# Patient Record
Sex: FEMALE | Race: WHITE | NOT HISPANIC OR LATINO | Employment: OTHER | URBAN - METROPOLITAN AREA
[De-identification: names, ages, dates, MRNs, and addresses within clinical notes are randomized per-mention and may not be internally consistent; named-entity substitution may affect disease eponyms.]

---

## 2021-02-17 ENCOUNTER — HOSPITAL ENCOUNTER (INPATIENT)
Facility: CLINIC | Age: 76
LOS: 12 days | Discharge: NURSING, REHAB OR LONG-TERM CARE | DRG: 025 | End: 2021-03-01
Attending: EMERGENCY MEDICINE | Admitting: INTERNAL MEDICINE
Payer: MEDICARE

## 2021-02-17 DIAGNOSIS — G93.89 BRAIN MASS: ICD-10-CM

## 2021-02-17 DIAGNOSIS — F41.9 ANXIETY: ICD-10-CM

## 2021-02-17 DIAGNOSIS — I10 ESSENTIAL HYPERTENSION: Primary | ICD-10-CM

## 2021-02-17 LAB
ALBUMIN SERPL-MCNC: 3.7 G/DL (ref 3.4–5)
ALBUMIN UR-MCNC: NEGATIVE MG/DL
ALP SERPL-CCNC: 86 U/L (ref 40–150)
ALT SERPL W P-5'-P-CCNC: 35 U/L (ref 0–50)
ANION GAP SERPL CALCULATED.3IONS-SCNC: 6 MMOL/L (ref 3–14)
APPEARANCE UR: CLEAR
AST SERPL W P-5'-P-CCNC: 28 U/L (ref 0–45)
BACTERIA #/AREA URNS HPF: ABNORMAL /HPF
BASOPHILS # BLD AUTO: 0 10E9/L (ref 0–0.2)
BASOPHILS NFR BLD AUTO: 0.4 %
BILIRUB SERPL-MCNC: 0.3 MG/DL (ref 0.2–1.3)
BILIRUB UR QL STRIP: NEGATIVE
BUN SERPL-MCNC: 17 MG/DL (ref 7–30)
CALCIUM SERPL-MCNC: 9.3 MG/DL (ref 8.5–10.1)
CHLORIDE SERPL-SCNC: 107 MMOL/L (ref 94–109)
CO2 SERPL-SCNC: 27 MMOL/L (ref 20–32)
COLOR UR AUTO: ABNORMAL
CREAT SERPL-MCNC: 0.72 MG/DL (ref 0.52–1.04)
DIFFERENTIAL METHOD BLD: ABNORMAL
EOSINOPHIL # BLD AUTO: 0.3 10E9/L (ref 0–0.7)
EOSINOPHIL NFR BLD AUTO: 4.7 %
ERYTHROCYTE [DISTWIDTH] IN BLOOD BY AUTOMATED COUNT: 13.5 % (ref 10–15)
GFR SERPL CREATININE-BSD FRML MDRD: 82 ML/MIN/{1.73_M2}
GLUCOSE SERPL-MCNC: 113 MG/DL (ref 70–99)
GLUCOSE UR STRIP-MCNC: NEGATIVE MG/DL
HCT VFR BLD AUTO: 36.7 % (ref 35–47)
HGB BLD-MCNC: 11.7 G/DL (ref 11.7–15.7)
HGB UR QL STRIP: NEGATIVE
IMM GRANULOCYTES # BLD: 0 10E9/L (ref 0–0.4)
IMM GRANULOCYTES NFR BLD: 0.3 %
INR PPP: 0.98 (ref 0.86–1.14)
KETONES UR STRIP-MCNC: NEGATIVE MG/DL
LABORATORY COMMENT REPORT: NORMAL
LEUKOCYTE ESTERASE UR QL STRIP: ABNORMAL
LYMPHOCYTES # BLD AUTO: 0.6 10E9/L (ref 0.8–5.3)
LYMPHOCYTES NFR BLD AUTO: 8.7 %
MCH RBC QN AUTO: 28.7 PG (ref 26.5–33)
MCHC RBC AUTO-ENTMCNC: 31.9 G/DL (ref 31.5–36.5)
MCV RBC AUTO: 90 FL (ref 78–100)
MONOCYTES # BLD AUTO: 0.6 10E9/L (ref 0–1.3)
MONOCYTES NFR BLD AUTO: 8.3 %
MUCOUS THREADS #/AREA URNS LPF: PRESENT /LPF
NEUTROPHILS # BLD AUTO: 5.3 10E9/L (ref 1.6–8.3)
NEUTROPHILS NFR BLD AUTO: 77.6 %
NITRATE UR QL: NEGATIVE
NRBC # BLD AUTO: 0 10*3/UL
NRBC BLD AUTO-RTO: 0 /100
PH UR STRIP: 7.5 PH (ref 5–7)
PLATELET # BLD AUTO: 353 10E9/L (ref 150–450)
POTASSIUM SERPL-SCNC: 4 MMOL/L (ref 3.4–5.3)
PROT SERPL-MCNC: 6.7 G/DL (ref 6.8–8.8)
RBC # BLD AUTO: 4.08 10E12/L (ref 3.8–5.2)
RBC #/AREA URNS AUTO: <1 /HPF (ref 0–2)
SARS-COV-2 RNA RESP QL NAA+PROBE: NEGATIVE
SODIUM SERPL-SCNC: 140 MMOL/L (ref 133–144)
SOURCE: ABNORMAL
SP GR UR STRIP: 1.01 (ref 1–1.03)
SPECIMEN SOURCE: NORMAL
SQUAMOUS #/AREA URNS AUTO: <1 /HPF (ref 0–1)
UROBILINOGEN UR STRIP-MCNC: 0 MG/DL (ref 0–2)
WBC # BLD AUTO: 6.9 10E9/L (ref 4–11)
WBC #/AREA URNS AUTO: 3 /HPF (ref 0–5)

## 2021-02-17 PROCEDURE — 250N000013 HC RX MED GY IP 250 OP 250 PS 637: Performed by: INTERNAL MEDICINE

## 2021-02-17 PROCEDURE — 250N000012 HC RX MED GY IP 250 OP 636 PS 637: Performed by: NURSE PRACTITIONER

## 2021-02-17 PROCEDURE — 99285 EMERGENCY DEPT VISIT HI MDM: CPT | Mod: 25

## 2021-02-17 PROCEDURE — 99221 1ST HOSP IP/OBS SF/LOW 40: CPT | Performed by: NEUROLOGICAL SURGERY

## 2021-02-17 PROCEDURE — 80053 COMPREHEN METABOLIC PANEL: CPT | Performed by: EMERGENCY MEDICINE

## 2021-02-17 PROCEDURE — 85610 PROTHROMBIN TIME: CPT | Performed by: EMERGENCY MEDICINE

## 2021-02-17 PROCEDURE — 99223 1ST HOSP IP/OBS HIGH 75: CPT | Mod: AI | Performed by: INTERNAL MEDICINE

## 2021-02-17 PROCEDURE — C9803 HOPD COVID-19 SPEC COLLECT: HCPCS

## 2021-02-17 PROCEDURE — 81001 URINALYSIS AUTO W/SCOPE: CPT | Performed by: EMERGENCY MEDICINE

## 2021-02-17 PROCEDURE — 85025 COMPLETE CBC W/AUTO DIFF WBC: CPT | Performed by: EMERGENCY MEDICINE

## 2021-02-17 PROCEDURE — 120N000001 HC R&B MED SURG/OB

## 2021-02-17 PROCEDURE — 87635 SARS-COV-2 COVID-19 AMP PRB: CPT | Performed by: EMERGENCY MEDICINE

## 2021-02-17 RX ORDER — BUSPIRONE HYDROCHLORIDE 15 MG/1
30 TABLET ORAL 2 TIMES DAILY
Status: DISCONTINUED | OUTPATIENT
Start: 2021-02-17 | End: 2021-03-01 | Stop reason: HOSPADM

## 2021-02-17 RX ORDER — ONDANSETRON 2 MG/ML
4 INJECTION INTRAMUSCULAR; INTRAVENOUS EVERY 6 HOURS PRN
Status: DISCONTINUED | OUTPATIENT
Start: 2021-02-17 | End: 2021-03-01 | Stop reason: HOSPADM

## 2021-02-17 RX ORDER — HYDRALAZINE HYDROCHLORIDE 20 MG/ML
10 INJECTION INTRAMUSCULAR; INTRAVENOUS EVERY 4 HOURS PRN
Status: DISCONTINUED | OUTPATIENT
Start: 2021-02-17 | End: 2021-03-01 | Stop reason: HOSPADM

## 2021-02-17 RX ORDER — LABETALOL HYDROCHLORIDE 5 MG/ML
10 INJECTION, SOLUTION INTRAVENOUS
Status: DISCONTINUED | OUTPATIENT
Start: 2021-02-17 | End: 2021-02-27

## 2021-02-17 RX ORDER — ACETAMINOPHEN 325 MG/1
650 TABLET ORAL EVERY 4 HOURS PRN
Status: ON HOLD | COMMUNITY
End: 2022-04-05

## 2021-02-17 RX ORDER — DEXAMETHASONE 4 MG/1
4 TABLET ORAL EVERY 6 HOURS SCHEDULED
Status: DISCONTINUED | OUTPATIENT
Start: 2021-02-17 | End: 2021-02-21

## 2021-02-17 RX ORDER — ONDANSETRON 4 MG/1
4 TABLET, ORALLY DISINTEGRATING ORAL EVERY 6 HOURS PRN
Status: DISCONTINUED | OUTPATIENT
Start: 2021-02-17 | End: 2021-03-01 | Stop reason: HOSPADM

## 2021-02-17 RX ORDER — BUPROPION HYDROCHLORIDE 150 MG/1
450 TABLET ORAL EVERY MORNING
Status: ON HOLD | COMMUNITY
End: 2021-03-01

## 2021-02-17 RX ORDER — LIDOCAINE 40 MG/G
CREAM TOPICAL
Status: DISCONTINUED | OUTPATIENT
Start: 2021-02-17 | End: 2021-03-01

## 2021-02-17 RX ORDER — ACETAMINOPHEN 500 MG
500 TABLET ORAL 2 TIMES DAILY PRN
Status: DISCONTINUED | OUTPATIENT
Start: 2021-02-17 | End: 2021-02-18

## 2021-02-17 RX ORDER — BUSPIRONE HYDROCHLORIDE 30 MG/1
30 TABLET ORAL 2 TIMES DAILY
Status: ON HOLD | COMMUNITY
End: 2024-05-05

## 2021-02-17 RX ADMIN — BUSPIRONE HYDROCHLORIDE 30 MG: 15 TABLET ORAL at 20:46

## 2021-02-17 RX ADMIN — DEXAMETHASONE 4 MG: 4 TABLET ORAL at 23:31

## 2021-02-17 RX ADMIN — DEXAMETHASONE 4 MG: 4 TABLET ORAL at 19:22

## 2021-02-17 RX ADMIN — ACETAMINOPHEN 500 MG: 500 TABLET, FILM COATED ORAL at 23:31

## 2021-02-17 ASSESSMENT — ENCOUNTER SYMPTOMS
WEAKNESS: 1
HEADACHES: 0
SPEECH DIFFICULTY: 0

## 2021-02-17 ASSESSMENT — ACTIVITIES OF DAILY LIVING (ADL): ADLS_ACUITY_SCORE: 15

## 2021-02-17 NOTE — PHARMACY-ADMISSION MEDICATION HISTORY
Pharmacy Medication History  Admission medication history interview status for the 2/17/2021  admission is complete. See EPIC admission navigator for prior to admission medications     Location of Interview: Phone  Medication history sources: Patient    Significant changes made to the medication list:  1) Med list fully updated from 2015.       Medication reconciliation completed by provider prior to medication history? No    Time spent in this activity: 10 minutes    Prior to Admission medications    Medication Sig Last Dose Taking? Auth Provider   acetaminophen (TYLENOL) 500 MG tablet Take 500 mg by mouth 2 times daily as needed for mild pain prn med Yes Unknown, Entered By History   buPROPion (WELLBUTRIN XL) 150 MG 24 hr tablet Take 450 mg by mouth every morning 2/17/2021 at am Yes Unknown, Entered By History   busPIRone HCl (BUSPAR) 30 MG tablet Take 30 mg by mouth 2 times daily 2/17/2021 at am x 1 dose Yes Unknown, Entered By History   FLUoxetine (PROZAC) 20 MG capsule Take 80 mg by mouth daily 2/17/2021 at am Yes Unknown, Entered By History       The information provided in this note is only as accurate as the sources available at the time of update(s)

## 2021-02-17 NOTE — ED NOTES
[facility]  ED Nurse Handoff Report    ED Chief complaint: Cerebrovascular Accident      ED Diagnosis:   Final diagnoses:   None       Code Status: Full Code    Allergies:   Allergies   Allergen Reactions     Bacitracin Rash     Bactroban is effective; No difficulties     Erythromycin      intolerant.     Meperidine Hcl      intolerant only   Demerol     Chloraprep One Step Rash       Patient Story: Olga comes to the ER today for abnormal ct/mri results of her head, see results. She has a brain tumor with bleeding and swelling. Her symptoms started last week when she was having difficulty with writing. VSS. Neuro pa is at the bedside at this time.        Treatments and/or interventions provided: neuro consult  Patient's response to treatments and/or interventions: na    To be done/followed up on inpatient unit:  steroids/surgery    Does this patient have any cognitive concerns?: no    Activity level - Baseline/Home:  Independent  Activity Level - Current:   Independent    Patient's Preferred language: English   Needed?: No    Isolation: None  Infection: Not Applicable  Patient tested for COVID 19 prior to admission: YES  Bariatric?: No    Vital Signs:   Vitals:    02/17/21 1307   BP: (!) 156/99   Pulse: 76   Resp: 14   Temp: 98  F (36.7  C)   TempSrc: Oral   SpO2: 97%       Cardiac Rhythm:     Was the PSS-3 completed:   yes  What interventions are required if any?               Family Comments: daughter at bedside  OBS brochure/video discussed/provided to patient/family: N/A              Name of person given brochure if not patient: na              Relationship to patient: na    For the majority of the shift this patient's behavior was Green.   Behavioral interventions performed were na.    ED NURSE PHONE NUMBER: [phone]
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
## 2021-02-17 NOTE — PROGRESS NOTES
RECEIVING UNIT ED HANDOFF REVIEW    ED Nurse Handoff Report was reviewed by: Joanie Pena RN on February 17, 2021 at 5:00 PM

## 2021-02-18 ENCOUNTER — APPOINTMENT (OUTPATIENT)
Dept: CT IMAGING | Facility: CLINIC | Age: 76
DRG: 025 | End: 2021-02-18
Attending: NURSE PRACTITIONER
Payer: MEDICARE

## 2021-02-18 PROBLEM — F32.9 MAJOR DEPRESSION: Status: ACTIVE | Noted: 2021-02-18

## 2021-02-18 PROBLEM — J44.9 CHRONIC OBSTRUCTIVE PULMONARY DISEASE (H): Status: ACTIVE | Noted: 2021-02-18

## 2021-02-18 LAB
ALBUMIN SERPL-MCNC: 3.7 G/DL (ref 3.4–5)
ALP SERPL-CCNC: 87 U/L (ref 40–150)
ALT SERPL W P-5'-P-CCNC: 35 U/L (ref 0–50)
ANION GAP SERPL CALCULATED.3IONS-SCNC: 6 MMOL/L (ref 3–14)
AST SERPL W P-5'-P-CCNC: 24 U/L (ref 0–45)
BILIRUB DIRECT SERPL-MCNC: 0.1 MG/DL (ref 0–0.2)
BILIRUB SERPL-MCNC: 0.3 MG/DL (ref 0.2–1.3)
BUN SERPL-MCNC: 19 MG/DL (ref 7–30)
CALCIUM SERPL-MCNC: 9.4 MG/DL (ref 8.5–10.1)
CHLORIDE SERPL-SCNC: 106 MMOL/L (ref 94–109)
CO2 SERPL-SCNC: 28 MMOL/L (ref 20–32)
CREAT SERPL-MCNC: 0.74 MG/DL (ref 0.52–1.04)
ERYTHROCYTE [DISTWIDTH] IN BLOOD BY AUTOMATED COUNT: 13.5 % (ref 10–15)
GFR SERPL CREATININE-BSD FRML MDRD: 79 ML/MIN/{1.73_M2}
GLUCOSE BLDC GLUCOMTR-MCNC: 117 MG/DL (ref 70–99)
GLUCOSE BLDC GLUCOMTR-MCNC: 135 MG/DL (ref 70–99)
GLUCOSE BLDC GLUCOMTR-MCNC: 99 MG/DL (ref 70–99)
GLUCOSE SERPL-MCNC: 132 MG/DL (ref 70–99)
HCT VFR BLD AUTO: 37.6 % (ref 35–47)
HGB BLD-MCNC: 12.1 G/DL (ref 11.7–15.7)
INR PPP: 1.03 (ref 0.86–1.14)
MCH RBC QN AUTO: 29 PG (ref 26.5–33)
MCHC RBC AUTO-ENTMCNC: 32.2 G/DL (ref 31.5–36.5)
MCV RBC AUTO: 90 FL (ref 78–100)
PLATELET # BLD AUTO: 343 10E9/L (ref 150–450)
POTASSIUM SERPL-SCNC: 4.2 MMOL/L (ref 3.4–5.3)
PROT SERPL-MCNC: 6.9 G/DL (ref 6.8–8.8)
RBC # BLD AUTO: 4.17 10E12/L (ref 3.8–5.2)
SODIUM SERPL-SCNC: 140 MMOL/L (ref 133–144)
WBC # BLD AUTO: 7 10E9/L (ref 4–11)

## 2021-02-18 PROCEDURE — 80048 BASIC METABOLIC PNL TOTAL CA: CPT | Performed by: INTERNAL MEDICINE

## 2021-02-18 PROCEDURE — 250N000011 HC RX IP 250 OP 636: Performed by: INTERNAL MEDICINE

## 2021-02-18 PROCEDURE — 250N000013 HC RX MED GY IP 250 OP 250 PS 637: Performed by: INTERNAL MEDICINE

## 2021-02-18 PROCEDURE — 36415 COLL VENOUS BLD VENIPUNCTURE: CPT | Performed by: INTERNAL MEDICINE

## 2021-02-18 PROCEDURE — 85610 PROTHROMBIN TIME: CPT | Performed by: INTERNAL MEDICINE

## 2021-02-18 PROCEDURE — 71260 CT THORAX DX C+: CPT | Mod: MG

## 2021-02-18 PROCEDURE — 99233 SBSQ HOSP IP/OBS HIGH 50: CPT | Performed by: NEUROLOGICAL SURGERY

## 2021-02-18 PROCEDURE — 80076 HEPATIC FUNCTION PANEL: CPT | Performed by: INTERNAL MEDICINE

## 2021-02-18 PROCEDURE — 120N000001 HC R&B MED SURG/OB

## 2021-02-18 PROCEDURE — 250N000009 HC RX 250: Performed by: INTERNAL MEDICINE

## 2021-02-18 PROCEDURE — 250N000012 HC RX MED GY IP 250 OP 636 PS 637: Performed by: NURSE PRACTITIONER

## 2021-02-18 PROCEDURE — 999N001017 HC STATISTIC GLUCOSE BY METER IP

## 2021-02-18 PROCEDURE — 85027 COMPLETE CBC AUTOMATED: CPT | Performed by: INTERNAL MEDICINE

## 2021-02-18 PROCEDURE — 250N000013 HC RX MED GY IP 250 OP 250 PS 637: Performed by: PHYSICIAN ASSISTANT

## 2021-02-18 PROCEDURE — 83036 HEMOGLOBIN GLYCOSYLATED A1C: CPT | Performed by: INTERNAL MEDICINE

## 2021-02-18 PROCEDURE — 99232 SBSQ HOSP IP/OBS MODERATE 35: CPT | Performed by: INTERNAL MEDICINE

## 2021-02-18 RX ORDER — SENNOSIDES 8.6 MG
2 TABLET ORAL 2 TIMES DAILY
Status: DISCONTINUED | OUTPATIENT
Start: 2021-02-18 | End: 2021-03-01 | Stop reason: HOSPADM

## 2021-02-18 RX ORDER — ACETAMINOPHEN 500 MG
500 TABLET ORAL EVERY 6 HOURS PRN
Status: DISCONTINUED | OUTPATIENT
Start: 2021-02-18 | End: 2021-02-25

## 2021-02-18 RX ORDER — LORAZEPAM 0.5 MG/1
0.5 TABLET ORAL 3 TIMES DAILY PRN
Status: DISCONTINUED | OUTPATIENT
Start: 2021-02-18 | End: 2021-03-01 | Stop reason: HOSPADM

## 2021-02-18 RX ORDER — POLYETHYLENE GLYCOL 3350 17 G/17G
17 POWDER, FOR SOLUTION ORAL 2 TIMES DAILY PRN
Status: DISCONTINUED | OUTPATIENT
Start: 2021-02-18 | End: 2021-03-01 | Stop reason: HOSPADM

## 2021-02-18 RX ORDER — BUPROPION HYDROCHLORIDE 150 MG/1
300 TABLET ORAL DAILY
Status: DISCONTINUED | OUTPATIENT
Start: 2021-02-18 | End: 2021-02-18

## 2021-02-18 RX ORDER — IOPAMIDOL 755 MG/ML
79 INJECTION, SOLUTION INTRAVASCULAR ONCE
Status: COMPLETED | OUTPATIENT
Start: 2021-02-18 | End: 2021-02-18

## 2021-02-18 RX ORDER — FAMOTIDINE 20 MG/1
20 TABLET, FILM COATED ORAL 2 TIMES DAILY
Status: DISCONTINUED | OUTPATIENT
Start: 2021-02-18 | End: 2021-03-01 | Stop reason: HOSPADM

## 2021-02-18 RX ADMIN — DEXAMETHASONE 4 MG: 4 TABLET ORAL at 06:48

## 2021-02-18 RX ADMIN — ACETAMINOPHEN 500 MG: 500 TABLET, FILM COATED ORAL at 21:40

## 2021-02-18 RX ADMIN — DEXAMETHASONE 4 MG: 4 TABLET ORAL at 18:56

## 2021-02-18 RX ADMIN — IOPAMIDOL 79 ML: 755 INJECTION, SOLUTION INTRAVENOUS at 15:21

## 2021-02-18 RX ADMIN — ACETAMINOPHEN 500 MG: 500 TABLET, FILM COATED ORAL at 12:42

## 2021-02-18 RX ADMIN — LORAZEPAM 0.5 MG: 0.5 TABLET ORAL at 12:42

## 2021-02-18 RX ADMIN — FLUOXETINE 80 MG: 20 CAPSULE ORAL at 09:41

## 2021-02-18 RX ADMIN — DEXAMETHASONE 4 MG: 4 TABLET ORAL at 12:42

## 2021-02-18 RX ADMIN — STANDARDIZED SENNA CONCENTRATE 2 TABLET: 8.6 TABLET ORAL at 21:29

## 2021-02-18 RX ADMIN — FAMOTIDINE 20 MG: 20 TABLET ORAL at 21:30

## 2021-02-18 RX ADMIN — BUSPIRONE HYDROCHLORIDE 30 MG: 15 TABLET ORAL at 09:42

## 2021-02-18 RX ADMIN — FAMOTIDINE 20 MG: 20 TABLET ORAL at 12:41

## 2021-02-18 RX ADMIN — BUSPIRONE HYDROCHLORIDE 30 MG: 15 TABLET ORAL at 21:31

## 2021-02-18 RX ADMIN — LORAZEPAM 0.5 MG: 0.5 TABLET ORAL at 21:30

## 2021-02-18 RX ADMIN — SODIUM CHLORIDE 64 ML: 9 INJECTION, SOLUTION INTRAVENOUS at 15:24

## 2021-02-18 ASSESSMENT — ENCOUNTER SYMPTOMS
NAUSEA: 0
COUGH: 0
VOMITING: 0
FEVER: 0
CONFUSION: 0
CHILLS: 0
SHORTNESS OF BREATH: 0

## 2021-02-18 ASSESSMENT — ACTIVITIES OF DAILY LIVING (ADL)
ADLS_ACUITY_SCORE: 13
ADLS_ACUITY_SCORE: 13
ADLS_ACUITY_SCORE: 14
ADLS_ACUITY_SCORE: 13

## 2021-02-18 NOTE — PROGRESS NOTES
[facility]    Neurosurgery Progress Note    Date of Service (when I saw the patient): 02/18/2021     Assessment & Plan   Olga Bailey is a 75 year old female with a history of skin carcinoma and meningioma (followed by neurologist Dr. Jayjay Tomlin) who was admitted on 2/17/2021. She presented with a brain mass. Today she was seen sitting up in bed. She is alert and follows commands appropriately. She is anxious about the new findings.     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 Stable small meningioma long the posterior right occipital convexity    Plan:   -Continue decadron  -Dr. Cummings to discuss findings and plan with patient and family this afternoon    ADDENDUM:  Dr. Cummings was able to meet with patient and family. Plan for continued decadron, CT CAP, and PT/OT evaluation. Surgery was discussed in detail. Plan for discharge from the hospital if deemed safe by hospital service and therapies in the next day or so with surgery next week.       I have discussed the following assessment and plan Dr. Cummings who is in agreement with initial plan and will follow up with further consultation recommendations.    Funmilayo Hampton CNP  [facility]  [address] [address]    Tel [phone]  Pager [phone]      Interval History   Stable.    Physical Exam   Temp: 97.8  F (36.6  C) Temp src: Oral BP: 120/75 Pulse: 78   Resp: 16 SpO2: 96 % O2 Device: None (Room air)    Vitals:    02/18/21 0330   Weight: 157 lb (71.2 kg)     Vital Signs with Ranges  Temp:  [97.4  F (36.3  C)-98  F (36.7  C)] 97.8  F (36.6  C)  Pulse:  [69-80] 78  Resp:  [12-16] 16  BP: (120-170)/(75-99) 120/75  SpO2:  [95 %-98 %] 96 %  I/O last 3  completed shifts:  In: 120 [P.O.:120]  Out: -      , Blood pressure 120/75, pulse 78, temperature 97.8  F (36.6  C), temperature source Oral, resp. rate 16, weight 157 lb (71.2 kg), SpO2 96 %.  157 lbs 0 oz  HEENT:  Normocephalic.    Heart:  No peripheral edema  Lungs:  No SOB  Skin:  Warm and dry, good capillary refill.  Extremities:  Good radial and dorsalis pedis pulses bilaterally, no edema, cyanosis or clubbing.    NEUROLOGICAL EXAMINATION:   Mental status:  Alert and follows commands.  Motor:  Strength is 5/5 throughout the upper and lower extremities    Medications       busPIRone HCl  30 mg Oral BID     dexamethasone  4 mg Oral Q6H ELGIN     famotidine  20 mg Oral BID     FLUoxetine  80 mg Oral Daily       Data     CBC RESULTS:   Recent Labs   Lab Test 02/18/21  0735   WBC 7.0   RBC 4.17   HGB 12.1   HCT 37.6   MCV 90   MCH 29.0   MCHC 32.2   RDW 13.5        Basic Metabolic Panel:  Lab Results   Component Value Date     02/18/2021      Lab Results   Component Value Date    POTASSIUM 4.2 02/18/2021     Lab Results   Component Value Date    CHLORIDE 106 02/18/2021     Lab Results   Component Value Date    ESTIVEN 9.4 02/18/2021     Lab Results   Component Value Date    CO2 28 02/18/2021     Lab Results   Component Value Date    BUN 19 02/18/2021     Lab Results   Component Value Date    CR 0.74 02/18/2021     Lab Results   Component Value Date     02/18/2021     INR:  Lab Results   Component Value Date    INR 1.03 02/18/2021    INR 0.98 02/17/2021

## 2021-02-18 NOTE — PROGRESS NOTES
[facility]    Hospitalist Progress Note    Assessment & Plan   75 year old female who was admitted on 2/17/2021 with headaches, speech and reading problems, and found to have left frontal-parital mass, and an incidental meningioma:     Impression:   Principal Problem:    Brain mass, 3.3 cm left Frontal Parietal    -- on Decadron, will add Pepcid bid to prevent stress ulceration     Active Problems:    Chronic obstructive pulmonary disease -- stable      Major depression   -- Wellbutrin held because of potential to lower seizure threshold, will continue Prozac      Anxiety   -- will add Ativan 0.5 mg tid prn      Plan:  Await definitive treatment plan per Neurosurgery.      DVT Prophylaxis: Pneumatic Compression Devices  Code Status: Full Code    Disposition: Expected discharge in several days     Bonilla Tian MD  Pager [phone]  Cell Phone [phone]  Text Page (7am to 6pm)    Interval History   Mild headache today.     Physical Exam   Temp: 97.8  F (36.6  C) Temp src: Oral BP: 120/75 Pulse: 78   Resp: 16 SpO2: 96 % O2 Device: None (Room air)    Vitals:    02/18/21 0330   Weight: 71.2 kg (157 lb)     Vital Signs with Ranges  Temp:  [97.4  F (36.3  C)-98  F (36.7  C)] 97.8  F (36.6  C)  Pulse:  [69-80] 78  Resp:  [12-16] 16  BP: (120-170)/(75-99) 120/75  SpO2:  [95 %-98 %] 96 %  I/O last 3 completed shifts:  In: 120 [P.O.:120]  Out: -     # Pain Assessment:  Current Pain Score 2/18/2021   Patient currently in pain? denies   Pain score (0-10) -   Olga negron pain level was assessed and she currently denies pain.        Constitutional: Awake, alert, cooperative, no apparent distress  Respiratory: Clear to auscultation bilaterally, no crackles or wheezing  Cardiovascular: Regular rate and rhythm, normal S1 and S2, and no murmur noted  GI: Normal bowel sounds, soft, non-distended, non-tender  Extrem: No calf tenderness, no ankle edema  Neuro: Ox3, no focal motor or sensory  deficits, slight tremor with both hands, and is right handed and has slight dysmetria with right hand FNF, and slow finger opposition.   Right pupil 1.5 mm and left 2.0 mm.     Medications       busPIRone HCl  30 mg Oral BID     dexamethasone  4 mg Oral Q6H ELGIN     famotidine  20 mg Oral BID     FLUoxetine  80 mg Oral Daily       Data   Recent Labs   Lab 02/18/21  0735 02/17/21  1305   WBC 7.0 6.9   HGB 12.1 11.7   MCV 90 90    353   INR 1.03 0.98    140   POTASSIUM 4.2 4.0   CHLORIDE 106 107   CO2 28 27   BUN 19 17   CR 0.74 0.72   ANIONGAP 6 6   ESTIVEN 9.4 9.3   * 113*   ALBUMIN 3.7 3.7   PROTTOTAL 6.9 6.7*   BILITOTAL 0.3 0.3   ALKPHOS 87 86   ALT 35 35   AST 24 28       Imaging:   No results found for this or any previous visit (from the past 24 hour(s)).
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
## 2021-02-18 NOTE — PLAN OF CARE
Pt here with new brain mass. A&Ox4. Neuros intact except for L pupil 3mm R 2mm. VSS, BP <150. Tele NSR. Regular diet, thin liquids. Takes pills whole with water. Up with SBA. Gave tylenol x1 for mild headache. Pt scoring green on the Aggression Stop Light Tool. Plan to discuss surgical options today. Belongings at bedside with patient.

## 2021-02-18 NOTE — PROGRESS NOTES
Pt here with brain mass. A&O X4. Neuros intact except for left pupil> then right. VSS. Tele NSR. Regular diet, thin liquids. Takes pills whole. Up with SBA. Denies pain. Pt scoring green on the Aggression Stop Light Tool. Plan to discuss surgical options with MD tomorrow.

## 2021-02-19 ENCOUNTER — APPOINTMENT (OUTPATIENT)
Dept: MRI IMAGING | Facility: CLINIC | Age: 76
DRG: 025 | End: 2021-02-19
Attending: NURSE PRACTITIONER
Payer: MEDICARE

## 2021-02-19 ENCOUNTER — APPOINTMENT (OUTPATIENT)
Dept: OCCUPATIONAL THERAPY | Facility: CLINIC | Age: 76
DRG: 025 | End: 2021-02-19
Attending: NURSE PRACTITIONER
Payer: MEDICARE

## 2021-02-19 ENCOUNTER — APPOINTMENT (OUTPATIENT)
Dept: PHYSICAL THERAPY | Facility: CLINIC | Age: 76
DRG: 025 | End: 2021-02-19
Attending: NURSE PRACTITIONER
Payer: MEDICARE

## 2021-02-19 LAB
GLUCOSE BLDC GLUCOMTR-MCNC: 101 MG/DL (ref 70–99)
GLUCOSE BLDC GLUCOMTR-MCNC: 113 MG/DL (ref 70–99)
GLUCOSE BLDC GLUCOMTR-MCNC: 135 MG/DL (ref 70–99)
GLUCOSE BLDC GLUCOMTR-MCNC: 205 MG/DL (ref 70–99)
HBA1C MFR BLD: 5.4 % (ref 0–5.6)

## 2021-02-19 PROCEDURE — G1004 CDSM NDSC: HCPCS

## 2021-02-19 PROCEDURE — 99232 SBSQ HOSP IP/OBS MODERATE 35: CPT | Performed by: INTERNAL MEDICINE

## 2021-02-19 PROCEDURE — 250N000013 HC RX MED GY IP 250 OP 250 PS 637: Performed by: INTERNAL MEDICINE

## 2021-02-19 PROCEDURE — 250N000013 HC RX MED GY IP 250 OP 250 PS 637: Performed by: PHYSICIAN ASSISTANT

## 2021-02-19 PROCEDURE — 97166 OT EVAL MOD COMPLEX 45 MIN: CPT | Mod: GO | Performed by: OCCUPATIONAL THERAPIST

## 2021-02-19 PROCEDURE — 97535 SELF CARE MNGMENT TRAINING: CPT | Mod: GO | Performed by: OCCUPATIONAL THERAPIST

## 2021-02-19 PROCEDURE — 250N000012 HC RX MED GY IP 250 OP 636 PS 637: Performed by: NURSE PRACTITIONER

## 2021-02-19 PROCEDURE — A9585 GADOBUTROL INJECTION: HCPCS | Performed by: INTERNAL MEDICINE

## 2021-02-19 PROCEDURE — 97110 THERAPEUTIC EXERCISES: CPT | Mod: GP

## 2021-02-19 PROCEDURE — 97535 SELF CARE MNGMENT TRAINING: CPT | Mod: GP

## 2021-02-19 PROCEDURE — 97112 NEUROMUSCULAR REEDUCATION: CPT | Mod: GP

## 2021-02-19 PROCEDURE — 250N000011 HC RX IP 250 OP 636: Performed by: INTERNAL MEDICINE

## 2021-02-19 PROCEDURE — 999N001017 HC STATISTIC GLUCOSE BY METER IP

## 2021-02-19 PROCEDURE — 97161 PT EVAL LOW COMPLEX 20 MIN: CPT | Mod: GP

## 2021-02-19 PROCEDURE — 255N000002 HC RX 255 OP 636: Performed by: INTERNAL MEDICINE

## 2021-02-19 PROCEDURE — 120N000001 HC R&B MED SURG/OB

## 2021-02-19 RX ORDER — NICOTINE POLACRILEX 4 MG
15-30 LOZENGE BUCCAL
Status: DISCONTINUED | OUTPATIENT
Start: 2021-02-19 | End: 2021-03-01 | Stop reason: HOSPADM

## 2021-02-19 RX ORDER — GADOBUTROL 604.72 MG/ML
20 INJECTION INTRAVENOUS ONCE
Status: COMPLETED | OUTPATIENT
Start: 2021-02-19 | End: 2021-02-19

## 2021-02-19 RX ORDER — DEXTROSE MONOHYDRATE 25 G/50ML
25-50 INJECTION, SOLUTION INTRAVENOUS
Status: DISCONTINUED | OUTPATIENT
Start: 2021-02-19 | End: 2021-03-01 | Stop reason: HOSPADM

## 2021-02-19 RX ADMIN — BUSPIRONE HYDROCHLORIDE 30 MG: 15 TABLET ORAL at 08:11

## 2021-02-19 RX ADMIN — ACETAMINOPHEN 500 MG: 500 TABLET, FILM COATED ORAL at 08:11

## 2021-02-19 RX ADMIN — GADOBUTROL 20 ML: 604.72 INJECTION INTRAVENOUS at 08:46

## 2021-02-19 RX ADMIN — DEXAMETHASONE 4 MG: 4 TABLET ORAL at 11:35

## 2021-02-19 RX ADMIN — STANDARDIZED SENNA CONCENTRATE 2 TABLET: 8.6 TABLET ORAL at 08:12

## 2021-02-19 RX ADMIN — FAMOTIDINE 20 MG: 20 TABLET ORAL at 08:11

## 2021-02-19 RX ADMIN — DEXAMETHASONE 4 MG: 4 TABLET ORAL at 00:37

## 2021-02-19 RX ADMIN — DEXAMETHASONE 4 MG: 4 TABLET ORAL at 17:40

## 2021-02-19 RX ADMIN — BUSPIRONE HYDROCHLORIDE 30 MG: 15 TABLET ORAL at 20:17

## 2021-02-19 RX ADMIN — FAMOTIDINE 20 MG: 20 TABLET ORAL at 20:18

## 2021-02-19 RX ADMIN — LORAZEPAM 0.5 MG: 0.5 TABLET ORAL at 20:18

## 2021-02-19 RX ADMIN — FLUOXETINE 80 MG: 20 CAPSULE ORAL at 08:12

## 2021-02-19 RX ADMIN — ONDANSETRON 4 MG: 4 TABLET, ORALLY DISINTEGRATING ORAL at 11:35

## 2021-02-19 RX ADMIN — STANDARDIZED SENNA CONCENTRATE 2 TABLET: 8.6 TABLET ORAL at 20:17

## 2021-02-19 RX ADMIN — DEXAMETHASONE 4 MG: 4 TABLET ORAL at 05:49

## 2021-02-19 ASSESSMENT — ACTIVITIES OF DAILY LIVING (ADL)
ADLS_ACUITY_SCORE: 17
ADLS_ACUITY_SCORE: 15
ADLS_ACUITY_SCORE: 14
ADLS_ACUITY_SCORE: 17
PREVIOUS_RESPONSIBILITIES: MEAL PREP;HOUSEKEEPING;LAUNDRY;SHOPPING;MEDICATION MANAGEMENT;FINANCES;DRIVING

## 2021-02-19 NOTE — PLAN OF CARE
A+O x 4. Left pupil larger than right. Some small word finding difficulties. Tremors in hands, shaky on feet, but good enough to be standby. NSR on tele. Daughter wants Dr. Jhaveri consulted. PT/OT consults for today. Regular diet. Full code. Having some emotional issues with situation. Tried to comfort, may need  service to pay a visit.

## 2021-02-19 NOTE — PROGRESS NOTES
[facility]    Hospitalist Progress Note    Assessment & Plan   75 year old female who was admitted on 2/17/2021 with headaches, speech and reading problems, and found to have left frontal-parital mass, and an incidental meningioma:     Impression:   Principal Problem:    Brain mass, 3.3 cm left Frontal Parietal    -- on Decadron, will add Pepcid bid to prevent stress ulceration       Hyperglycemia, 2nd to Steroids   -- Insulin as needed    Active Problems:    Chronic obstructive pulmonary disease -- stable      Major depression   -- Wellbutrin held because of potential to lower seizure threshold, will continue Prozac      Anxiety   -- will add Ativan 0.5 mg tid prn      Plan:  Await definitive treatment plan per Neurosurgery (Anticipate Surgery next ?Tues)     DVT Prophylaxis: Pneumatic Compression Devices  Code Status: Full Code    Disposition: Expected discharge in several days     Bonilla Tian MD  Pager [phone]  Cell Phone [phone]  Text Page (7am to 6pm)    Interval History   Minimal headache today.  Writing still shaky but improved.     Physical Exam   Temp: 97.6  F (36.4  C) Temp src: Oral BP: (!) 142/84 Pulse: 78   Resp: 16 SpO2: 96 % O2 Device: None (Room air)    Vitals:    02/18/21 0330   Weight: 71.2 kg (157 lb)     Vital Signs with Ranges  Temp:  [97.5  F (36.4  C)-98.5  F (36.9  C)] 97.6  F (36.4  C)  Pulse:  [73-78] 78  Resp:  [16] 16  BP: (121-142)/(69-84) 142/84  SpO2:  [96 %-99 %] 96 %  I/O last 3 completed shifts:  In: 360 [P.O.:360]  Out: -     # Pain Assessment:  Current Pain Score 2/19/2021   Patient currently in pain? yes   Pain score (0-10) -   Olga negron pain level was assessed and she currently denies pain.        Constitutional: Awake, alert, cooperative, no apparent distress  Respiratory: Clear to auscultation bilaterally, no crackles or wheezing  Cardiovascular: Regular rate and rhythm, normal S1 and S2, and no murmur noted  GI: Normal bowel  sounds, soft, non-distended, non-tender  Extrem: No calf tenderness, no ankle edema  Neuro: Ox3, no focal motor or sensory deficits, slight tremor with both hands, and is right handed and has slight dysmetria with right hand FNF, and slow finger opposition.     Medications       busPIRone HCl  30 mg Oral BID     dexamethasone  4 mg Oral Q6H ELGIN     famotidine  20 mg Oral BID     FLUoxetine  80 mg Oral Daily     insulin aspart  1-10 Units Subcutaneous TID AC     insulin aspart  1-7 Units Subcutaneous At Bedtime     sennosides  2 tablet Oral BID       Data   Recent Labs   Lab 02/18/21  0735 02/17/21  1305   WBC 7.0 6.9   HGB 12.1 11.7   MCV 90 90    353   INR 1.03 0.98    140   POTASSIUM 4.2 4.0   CHLORIDE 106 107   CO2 28 27   BUN 19 17   CR 0.74 0.72   ANIONGAP 6 6   ESTIVEN 9.4 9.3   * 113*   ALBUMIN 3.7 3.7   PROTTOTAL 6.9 6.7*   BILITOTAL 0.3 0.3   ALKPHOS 87 86   ALT 35 35   AST 24 28       Imaging:   Recent Results (from the past 24 hour(s))   CT Chest/Abdomen/Pelvis w Contrast    Narrative    CT CHEST/ABDOMEN/PELVIS WITH CONTRAST  2/18/2021 3:31 PM    CLINICAL HISTORY: Brain mass, stroke, evaluate for primary malignancy.    TECHNIQUE: CT scan of the chest, abdomen, and pelvis was performed  following injection of IV contrast. Multiplanar reformats were  obtained. Dose reduction techniques were used.   CONTRAST: 79 mL Isovue-370    COMPARISON: None.    FINDINGS:   LUNGS AND PLEURA: Lungs are clear. No pleural effusions.    MEDIASTINUM/AXILLAE: No adenopathy or aneurysm.    HEPATOBILIARY: No significant mass or bile duct dilatation. No  calcified gallstones.     PANCREAS: No significant mass, duct dilatation, or inflammatory  change.    SPLEEN: Normal size.    ADRENAL GLANDS: No significant nodules.    KIDNEYS/BLADDER: No significant mass, stones, or hydronephrosis.  Low-attenuation subcentimeter renal lesion(s). These are compatible  small benign cysts and no specific imaging evaluation or  follow-up is  recommended.    BOWEL: No obstruction or inflammatory change.    PELVIC ORGANS: No pelvic masses.    ADDITIONAL FINDINGS: No adenopathy or free fluid. There are moderate  atherosclerotic changes of the visualized aorta and its branches.  There is no evidence of aortic dissection or aneurysm.    MUSCULOSKELETAL: Survey of the visualized bony structures demonstrates  no destructive bony lesions.      Impression    IMPRESSION:  No primary malignancy demonstrated in the chest, abdomen,  and pelvis.    NETTE HAWLEY MD   MR Brain w Contrast Stereotactic    Narrative    MR BRAIN WITH CONTRAST STEREOTACTIC 2/19/2021 9:15 AM     HISTORY: Left frontal parietal mass. MRI stealth protocol requested  for neurosurgical purposes prior to neurosurgical intervention.  History of skin carcinoma.    TECHNIQUE: Multiplanar multisequence images were obtained through the  brain with intravenous contrast. 20 mL of Gadavist given.    COMPARISON: MR dated 2/17/2021.    FINDINGS: Exam was performed for neurosurgical stereotactic  localization purposes. There is a heterogeneous enhancing mass in the  left frontal parietal region measuring 3.3 x 2.8 x 2.8 cm. There is  extensive surrounding edema. This mass is dural based and could be  intra-axial or extra-axial. There is a second enhancing lesion which  is smaller and also dural based in the right occipital lobe. This  measures approximately 0.5 cm in thickness and 1.0 x 1.0 cm at its  base. No other abnormal areas of enhancement are evident. There is  some moderately extensive white matter changes as well as some subtle  changes in the brainstem without mass effect or enhancement.  Ventricles are felt to be within normal limits for age.      Impression    IMPRESSION:  1. 3.3 x 2.8 x 2.8 cm enhancing mass in left frontal parietal region  with dural base. This could be intra-axial or extra-axial.  2. 0.5 x 1.0 x 1.0 cm enhancing mass in right occipital lobe which is  dural  based. This appears to be extra-axial.  3. Exam performed for neurosurgical purposes.    GO LINDSEY MD

## 2021-02-19 NOTE — PROGRESS NOTES
02/19/21 1417   Quick Adds   Quick Adds Vestibular Eval   Type of Visit Initial PT Evaluation   Living Environment   People in home alone   Current Living Arrangements house   Home Accessibility stairs to enter home;stairs within home   Number of Stairs, Main Entrance 2   Stair Railings, Main Entrance none   Number of Stairs, Within Home, Primary   (optional flight to basement)   Stair Railings, Within Home, Primary railings safe and in good condition   Living Environment Comments Dtr concerned about pt returning home from here in current state.    Self-Care   Usual Activity Tolerance good   Current Activity Tolerance moderate   Equipment Currently Used at Home none   Activity/Exercise/Self-Care Comment Tessa R TKA Aug, up to 4mi daily no AD, was falling pre-TKA.    Disability/Function   Walking or Climbing Stairs Difficulty no   Dressing/Bathing Difficulty no   Toileting issues no   Fall history within last six months no  (Yes pre-TKA though)   Change in Functional Status Since Onset of Current Illness/Injury yes   General Information   Onset of Illness/Injury or Date of Surgery 02/17/21   Referring Physician Funmilayo Hampton, NP   Patient/Family Therapy Goals Statement (PT) To get better.    Pertinent History of Current Problem (include personal factors and/or comorbidities that impact the POC) L frontoparietal mass, skin CA with incidental meningioma - L pupil larger, word-finding difficulties, hand tremors and legs shaking, HA, reading difficulties, emotional. Anx, Dep, COPD, 3rd degree heart block, Afib.    Existing Precautions/Restrictions fall   Weight-Bearing Status - LUE full weight-bearing  (all)   Cognition   Affect/Mental Status (Cognition) anxious   Follows Commands (Cognition) other (see comments)   Cognitive Status Comments Apraxic, trying hard.    Pain Assessment   Patient Currently in Pain No   Posture    Posture Comments Impaired controlled mobility.    Range of Motion (ROM)   ROM Comment  WFL   Strength   Strength Comments WFL. MMT: hip flx/abd 4+ L 5R, 5B hip add / knee flx + ext / DF / ev + inv.    Transfers   Transfer Safety Comments SBA no AD stands, Andrés intermittenly pivots No AD.    Gait/Stairs (Locomotion)   Distance in Feet (Required for LE Total Joints) 500' Andrés no AD R path dev EC, LOB with head turns, slow.    Comment (Gait/Stairs) 1 flight 1 rail CGA, Andrés no AD posterior LOB descending.    Balance   Balance Comments FGA 4/30 - high falls risk < 23.    Coordination   Coordination Comments Alt toe taps slow but accurate. L foot unable to perform alt heel taps.    Muscle Tone   Muscle Tone Comments WFL per observation only.    Oculomotor Exam   Smooth Pursuit Abnormal   Smooth Pursuit Comment Slow with slips - at times overshooting.    Saccades Abnormal   Saccades Comments Mildly inaccurate at slow speeds.   VOR Abnormal   VOR Comments Slips at slow speeds horizontally > vertically. Funcitonally: lateral LOBs R > L with head turns; L path deviation with EC.    VOR Cancellation Abnormal   VOR Cancellation Comments Slow with slips - at times overshooting.    Rapid Head Thrust Comments Unable given pt guarding/tension - ceased.    Convergence Testing Abnormal   Convergence Testing Comments L eye struggling from ~2', L eye abd from ~1ft.    Clinical Impression   Criteria for Skilled Therapeutic Intervention yes, treatment indicated   PT Diagnosis (PT) Impaired mobility   Influenced by the following impairments Impaired strength, balance, coordination, oculomotor, vestib, motor control.    Functional limitations due to impairments Impaired indpeendnet mobility & living   Clinical Presentation Evolving/Changing   Clinical Presentation Rationale Prof discretion   Clinical Decision Making (Complexity) low complexity   Therapy Frequency (PT) 2x/day   Predicted Duration of Therapy Intervention (days/wks) 1 week    Planned Therapy Interventions (PT) balance training;bed mobility training;gait  training;home exercise program;motor coordination training;neuromuscular re-education;patient/family education;postural re-education;stair training;strengthening;transfer training   Anticipated Equipment Needs at Discharge (PT) walker, rolling;gait belt   Risk & Benefits of therapy have been explained evaluation/treatment results reviewed;care plan/treatment goals reviewed;risks/benefits reviewed;current/potential barriers reviewed;participants voiced agreement with care plan;participants included;patient   PT Discharge Planning    PT Discharge Recommendation (DC Rec) [facility] patient will benefit from intensive, interdisciplinary therapy.  Anticipate will be able to tolerate 3 hours of therapy per day   PT Rationale for DC Rec Motivated, hard-working, good support from dtr (RN, pt was also an RN), excellent mobility prognosis with intensive rehab for current deficits.   PT Brief overview of current status  SBA stands, Andrés pivots and 500', FGA 4/30 - high falls risk. Impaired oculomotor, vestib, coordination, apraxia.    Total Evaluation Time   Total Evaluation Time (Minutes) 20

## 2021-02-19 NOTE — CONSULTS
Ms. Bailey is a 75-year-old woman admitted for progressive difficulty with dysarthria, dysphagia and issues related to the use of her right upper extremity.  For full details of initial consultation, please refer to the initial neurosurgical consultation prepared by nurse practitioner Lelia on February 17.  She has been stable since the time of admission and is undergone additional evaluation including both CT and MRI which demonstrated a longstanding small right occipital meningioma, likely incidental, as well as a new lesion present over the left parietal region which is dural based and producing significant hemispheric edema.  At the time of my visit with the patient, she continued to experience difficulty with word finding and some apraxia with use of her right upper extremity.  In discussing this with her further it also appears that she has difficulty with calculation and mathematical function as well suggesting a dominant parietal syndrome.    I reviewed the clinical and radiographic findings in detail with the patient and her daughter who was also present at the bedside.  I spoke with him at length regarding the differential diagnosis for this lesion as well as management alternatives, both diagnostic and therapeutic.  Given the circumstances, I have recommended a craniotomy over the left parietal region for excisional biopsy of this fairly sizable tumor.  I believe that this approach is most likely to produce a reliable pathologic diagnosis as well as to diminish the stimulus for cerebral edema which may be the biggest functionally limiting problem currently.  I went over the details of the proposed surgery including the use of frameless stereotaxy and neuro navigation.  I told the patient and her daughter that the risks of the procedure include increased neurologic deficit, injury to the surrounding brain, infection, bleeding, recurrent or residual tumor, need for second operation, etc.  She appeared to  have a good understanding at the conclusion of our discussion, asked appropriate questions which I answered and was willing to proceed as recommended.  Tentatively, she has been placed on the operating room schedule for next week.  I am hopeful that with continued use of high-dose dexamethasone that she will improve functionally to the point where she can be discharged home and readmitted at the time of surgery.    Approximately 1 hour was spent in total reviewing the clinical and radiographic findings as well as discussing management alternatives, risks and benefits with the patient and her daughter.

## 2021-02-19 NOTE — PLAN OF CARE
Patient is here with new brain mass. A&0x4, VSS. Neuros stable, patient/daughter reported for the last several weeks patient has noted difficulty with word finding and writing. Left pupil slightly larger than right, both equal and reactive. Gait instability also noted, per daughter this is baseline, but seems to be slightly worse than normal. Tylenol given for headache. Up to the bathroom with SBA/GB. Tolerating regular diet. Tele NSR. Plan pending, possible surgery next week with Dr. Cummings, however daughter called to say she would like Dr. Jhaveri consulted to speak with them about surgery as well.

## 2021-02-19 NOTE — PLAN OF CARE
Pt here with brain mass. Alert and oriented x4. VSS on RA. Tele NSR. Neuros with L pupil slightly larger than R and UE tremors L>R. CMS intact. Lung sounds clear. C/o headache, decreased with prn tylenol and cold pack application. Ambulating with asst of 1 and GB. Completed MRI mapping. Plan for surgery next week per neurosurgery note. Daughter at the bedside, updated re plans and supportive of pt.

## 2021-02-19 NOTE — PLAN OF CARE
Pt here with new brain mass. A&O x4. Neuros include WFD while speaking and writing, increasingly unstable gait, and left pupil larger than right pupil. VSS. Regular diet, thin liquids. Takes pills whole. Up with A1 + GB. Denies pain. Pt scoring green on the Aggression Stop Light Tool. Plan for possible surgery. Discharge pending evaluation by therapies and medical readiness.

## 2021-02-19 NOTE — PROGRESS NOTES
[facility]    Neurosurgery  Daily Note    Assessment & Plan     Olga Bailey is a 75 year old female with a history of skin carcinoma and meningioma (followed by neurologist Dr. Jayjay Tomlin) who was admitted on 2/17/2021. She presented with a brain mass. Today she was seen sitting in her bedside chair. She is alert and follows commands appropriately. Feeling a bit better today, having had a little more time to adjust to the new findings.   Indicates she would prefer to stay in hospital until surgery, if possible.   Plan:  -answered questions for her today. She understands we may not able to keep her in hospital from a medical standpoint, but will try.   Plan for OR next week.     Manuel Ferraro    Interval History   Stable.    Physical Exam   Temp: 97.6  F (36.4  C) Temp src: Oral BP: (!) 142/84 Pulse: 78   Resp: 16 SpO2: 96 % O2 Device: None (Room air)    Vitals:    02/18/21 0330   Weight: 71.2 kg (157 lb)     Vital Signs with Ranges  Temp:  [97.5  F (36.4  C)-98.5  F (36.9  C)] 97.6  F (36.4  C)  Pulse:  [73-78] 78  Resp:  [16] 16  BP: (121-147)/(69-84) 142/84  SpO2:  [96 %-99 %] 96 %  I/O last 3 completed shifts:  In: 360 [P.O.:360]  Out: -     Alert and oriented.  Moves all extremities equally.  Tremulous right arm/hand      Medications        busPIRone HCl  30 mg Oral BID     dexamethasone  4 mg Oral Q6H ELGIN     famotidine  20 mg Oral BID     FLUoxetine  80 mg Oral Daily     insulin aspart  1-10 Units Subcutaneous TID AC     insulin aspart  1-7 Units Subcutaneous At Bedtime     sennosides  2 tablet Oral BID           Manuel Ferraro PA-C  [facility] Neurosurgery  [address] [address]    Tel [phone]  Pager [phone]

## 2021-02-20 ENCOUNTER — APPOINTMENT (OUTPATIENT)
Dept: PHYSICAL THERAPY | Facility: CLINIC | Age: 76
DRG: 025 | End: 2021-02-20
Payer: MEDICARE

## 2021-02-20 LAB
ANION GAP SERPL CALCULATED.3IONS-SCNC: 4 MMOL/L (ref 3–14)
BUN SERPL-MCNC: 26 MG/DL (ref 7–30)
CALCIUM SERPL-MCNC: 9.7 MG/DL (ref 8.5–10.1)
CHLORIDE SERPL-SCNC: 106 MMOL/L (ref 94–109)
CO2 SERPL-SCNC: 26 MMOL/L (ref 20–32)
CREAT SERPL-MCNC: 0.77 MG/DL (ref 0.52–1.04)
ERYTHROCYTE [DISTWIDTH] IN BLOOD BY AUTOMATED COUNT: 13.7 % (ref 10–15)
GFR SERPL CREATININE-BSD FRML MDRD: 75 ML/MIN/{1.73_M2}
GLUCOSE BLDC GLUCOMTR-MCNC: 107 MG/DL (ref 70–99)
GLUCOSE BLDC GLUCOMTR-MCNC: 117 MG/DL (ref 70–99)
GLUCOSE BLDC GLUCOMTR-MCNC: 155 MG/DL (ref 70–99)
GLUCOSE BLDC GLUCOMTR-MCNC: 158 MG/DL (ref 70–99)
GLUCOSE BLDC GLUCOMTR-MCNC: 174 MG/DL (ref 70–99)
GLUCOSE SERPL-MCNC: 127 MG/DL (ref 70–99)
HCT VFR BLD AUTO: 38.6 % (ref 35–47)
HGB BLD-MCNC: 12.2 G/DL (ref 11.7–15.7)
MCH RBC QN AUTO: 28.6 PG (ref 26.5–33)
MCHC RBC AUTO-ENTMCNC: 31.6 G/DL (ref 31.5–36.5)
MCV RBC AUTO: 91 FL (ref 78–100)
PLATELET # BLD AUTO: 357 10E9/L (ref 150–450)
POTASSIUM SERPL-SCNC: 4.3 MMOL/L (ref 3.4–5.3)
RBC # BLD AUTO: 4.26 10E12/L (ref 3.8–5.2)
SODIUM SERPL-SCNC: 136 MMOL/L (ref 133–144)
WBC # BLD AUTO: 12.8 10E9/L (ref 4–11)

## 2021-02-20 PROCEDURE — 99232 SBSQ HOSP IP/OBS MODERATE 35: CPT | Performed by: INTERNAL MEDICINE

## 2021-02-20 PROCEDURE — 250N000013 HC RX MED GY IP 250 OP 250 PS 637: Performed by: INTERNAL MEDICINE

## 2021-02-20 PROCEDURE — 250N000012 HC RX MED GY IP 250 OP 636 PS 637: Performed by: NURSE PRACTITIONER

## 2021-02-20 PROCEDURE — 36415 COLL VENOUS BLD VENIPUNCTURE: CPT | Performed by: INTERNAL MEDICINE

## 2021-02-20 PROCEDURE — 250N000013 HC RX MED GY IP 250 OP 250 PS 637: Performed by: PHYSICIAN ASSISTANT

## 2021-02-20 PROCEDURE — 97112 NEUROMUSCULAR REEDUCATION: CPT | Mod: GP

## 2021-02-20 PROCEDURE — 250N000012 HC RX MED GY IP 250 OP 636 PS 637: Performed by: INTERNAL MEDICINE

## 2021-02-20 PROCEDURE — 85027 COMPLETE CBC AUTOMATED: CPT | Performed by: INTERNAL MEDICINE

## 2021-02-20 PROCEDURE — 97110 THERAPEUTIC EXERCISES: CPT | Mod: GP

## 2021-02-20 PROCEDURE — 99231 SBSQ HOSP IP/OBS SF/LOW 25: CPT | Performed by: PHYSICIAN ASSISTANT

## 2021-02-20 PROCEDURE — 120N000001 HC R&B MED SURG/OB

## 2021-02-20 PROCEDURE — 80048 BASIC METABOLIC PNL TOTAL CA: CPT | Performed by: INTERNAL MEDICINE

## 2021-02-20 PROCEDURE — 999N001017 HC STATISTIC GLUCOSE BY METER IP

## 2021-02-20 PROCEDURE — 250N000011 HC RX IP 250 OP 636: Performed by: INTERNAL MEDICINE

## 2021-02-20 RX ADMIN — BUSPIRONE HYDROCHLORIDE 30 MG: 15 TABLET ORAL at 08:09

## 2021-02-20 RX ADMIN — DEXAMETHASONE 4 MG: 4 TABLET ORAL at 00:38

## 2021-02-20 RX ADMIN — DEXAMETHASONE 4 MG: 4 TABLET ORAL at 18:13

## 2021-02-20 RX ADMIN — BUSPIRONE HYDROCHLORIDE 30 MG: 15 TABLET ORAL at 20:52

## 2021-02-20 RX ADMIN — INSULIN ASPART 2 UNITS: 100 INJECTION, SOLUTION INTRAVENOUS; SUBCUTANEOUS at 18:10

## 2021-02-20 RX ADMIN — FAMOTIDINE 20 MG: 20 TABLET ORAL at 20:52

## 2021-02-20 RX ADMIN — ACETAMINOPHEN 500 MG: 500 TABLET, FILM COATED ORAL at 04:55

## 2021-02-20 RX ADMIN — DEXAMETHASONE 4 MG: 4 TABLET ORAL at 05:58

## 2021-02-20 RX ADMIN — STANDARDIZED SENNA CONCENTRATE 2 TABLET: 8.6 TABLET ORAL at 08:09

## 2021-02-20 RX ADMIN — LORAZEPAM 0.5 MG: 0.5 TABLET ORAL at 22:25

## 2021-02-20 RX ADMIN — FLUOXETINE 80 MG: 20 CAPSULE ORAL at 08:09

## 2021-02-20 RX ADMIN — STANDARDIZED SENNA CONCENTRATE 2 TABLET: 8.6 TABLET ORAL at 20:52

## 2021-02-20 RX ADMIN — DEXAMETHASONE 4 MG: 4 TABLET ORAL at 12:09

## 2021-02-20 RX ADMIN — INSULIN ASPART 1 UNITS: 100 INJECTION, SOLUTION INTRAVENOUS; SUBCUTANEOUS at 09:37

## 2021-02-20 RX ADMIN — FAMOTIDINE 20 MG: 20 TABLET ORAL at 08:09

## 2021-02-20 RX ADMIN — LABETALOL HYDROCHLORIDE 10 MG: 5 INJECTION, SOLUTION INTRAVENOUS at 16:14

## 2021-02-20 ASSESSMENT — ACTIVITIES OF DAILY LIVING (ADL)
ADLS_ACUITY_SCORE: 14
ADLS_ACUITY_SCORE: 14
ADLS_ACUITY_SCORE: 15
ADLS_ACUITY_SCORE: 15
ADLS_ACUITY_SCORE: 14
ADLS_ACUITY_SCORE: 15

## 2021-02-20 NOTE — PROGRESS NOTES
Pt here with brain mass. A/Ox4. VSS on RA. Tele NSR. Neuros with L pupil slightly larger than R and UE tremors L>R.No c/o pain. Ambulating with A1/GB.Plan for surgery next week per neurosurgery.  Ativan PRN given at bedtime per patient request.

## 2021-02-20 NOTE — PLAN OF CARE
Pt here with brain masses. A&Ox4. Neuros with bue tremors, L pupil 3, R pupil 4. VSS on RA. Tele NSR. reg diet, thin liquids. Takes pills whole with water Up with A1GB. Tylenol given for headache. Pt scoring greenon the Aggression Stop Light Tool. Plan for surgery with hunt in the upcomming week.

## 2021-02-20 NOTE — PLAN OF CARE
Here with left parietal brain mass.  Neuros stable.  Right pupil slightly smaller than left, both reactive.  Bilateral UE tremors at baseline.  Difficulty with writing.  Denies nausea or dizziness.  Tele NSR.  Denies pain.  Up in room independently, SBA/gait belt in halls.  Blood glucose with sliding scale insulin given this am.  Plan for surgery on Tuesday 2/23/21.    Addendum:  SBP elevated at 1600 check 150-155.  Labatolol 10 mg given, recheck 145/78.

## 2021-02-20 NOTE — PROGRESS NOTES
"SPIRITUAL HEALTH SERVICES Progress Note  [facility]      intern initial visit per consult request.  Extensive reflective sharing by this pt about her birth and formative years raised in [place]--both the joys and the hurt.  She shared both her desire to be back in [place]  and her regret about not being able to do so while her children were young and her parents were still alive.    Pt is an RN (retired) and has a supportive daughter, La Nena who is an RN here at [facility]. Pt also has 2 sons--one residing nearby with 2 granddaughters.    Pt is more recently .     Pt is now processing an uncertain prognosis while identifying her sources of strength as proudly independent person.  Pt is already inquiring about TCU options; and is interested and hopeful that [facility] might be available to match her needs as it is geographically connected to her current care team.  Pt shared that it has been difficult to release into tears yet as she has always been the \"Strong and present parent within the family\" and \"Everything (the symptoms and hospitalization) feels like quick and recent changes to adjust to\".   Pt shared about the support she relies on from a therapist in  managing her longstanding anxiety and depression.     affirmed this pt's resilience to isolation/loneliness and family dynamics.     Pt welcomed continued SH follow up as she processes her diagnosis and prognosis.     Sachi Huertas   Intern      "

## 2021-02-20 NOTE — PROGRESS NOTES
Neurosurgery progress note    Today patient states she has felt relatively well throughout the day although she is very nervous about her upcoming surgery.  She states she continues having difficulty with generating writing with her right hand.  She feels that her speaking is is working well.  She is reporting some difficulty with putting numbers into her telephone as well.  She has a slight right-sided arm drift, negative drift on the left.  Finger-nose slow and accurate bilaterally negative just her lower extremities.  Appropriate strength in bilateral upper extremities  Extraocular wounds intact.    Plan is for surgical intervention next week.    Continue Decadron    Continue PT and OT.

## 2021-02-20 NOTE — PROGRESS NOTES
[facility]    Hospitalist Progress Note    Assessment & Plan   75 year old female who was admitted on 2/17/2021 with headaches, speech and reading problems, and found to have left frontal-parital mass, and an incidental meningioma:     Impression:   Principal Problem:    Brain mass, 3.3 cm left Frontal Parietal    -- on Decadron, will add Pepcid bid to prevent stress ulceration       Hyperglycemia, 2nd to Steroids   -- Insulin as needed      Leukocytosis -- probably 2nd to steroids    Active Problems:    Chronic obstructive pulmonary disease -- stable      Major depression   -- Wellbutrin held because of potential to lower seizure threshold, will continue Prozac      Anxiety   -- will add Ativan 0.5 mg tid prn      Plan:  Await definitive treatment plan per Neurosurgery (Anticipate Surgery next ?Tues with Dr. Cummings.       DVT Prophylaxis: Pneumatic Compression Devices  Code Status: Full Code    Disposition: Expected discharge in several days     Bonilla Tian MD  Pager [phone]  Cell Phone [phone]  Text Page (7am to 6pm)    Interval History   No headache today.  Writing still shaky but improving.      Physical Exam   Temp: 97.8  F (36.6  C) Temp src: Oral BP: (!) 167/82 Pulse: 67   Resp: 16 SpO2: 96 % O2 Device: None (Room air)    Vitals:    02/18/21 0330 02/20/21 0506   Weight: 71.2 kg (157 lb) 68.4 kg (150 lb 12.8 oz)     Vital Signs with Ranges  Temp:  [97.8  F (36.6  C)-98.7  F (37.1  C)] 97.8  F (36.6  C)  Pulse:  [64-72] 67  Resp:  [16] 16  BP: (131-167)/(49-85) 167/82  SpO2:  [95 %-98 %] 96 %  No intake/output data recorded.    # Pain Assessment:  Current Pain Score 2/20/2021   Patient currently in pain? denies   Pain score (0-10) -   Olga negron pain level was assessed and she currently denies pain.        Constitutional: Awake, alert, cooperative, no apparent distress  Respiratory: Clear to auscultation bilaterally, no crackles or wheezing  Cardiovascular:  Regular rate and rhythm, normal S1 and S2, and no murmur noted  GI: Normal bowel sounds, soft, non-distended, non-tender  Extrem: No calf tenderness, no ankle edema  Neuro: Ox3, no focal motor or sensory deficits, slight tremor with both hands, and is right handed and has slight dysmetria with right hand FNF, and slow finger opposition.     Medications       busPIRone HCl  30 mg Oral BID     dexamethasone  4 mg Oral Q6H ELGIN     famotidine  20 mg Oral BID     FLUoxetine  80 mg Oral Daily     insulin aspart  1-10 Units Subcutaneous TID AC     insulin aspart  1-7 Units Subcutaneous At Bedtime     sennosides  2 tablet Oral BID       Data   Recent Labs   Lab 02/20/21  0929 02/18/21  0735 02/17/21  1305   WBC 12.8* 7.0 6.9   HGB 12.2 12.1 11.7   MCV 91 90 90    343 353   INR  --  1.03 0.98    140 140   POTASSIUM 4.3 4.2 4.0   CHLORIDE 106 106 107   CO2 26 28 27   BUN 26 19 17   CR 0.77 0.74 0.72   ANIONGAP 4 6 6   ESTIVEN 9.7 9.4 9.3   * 132* 113*   ALBUMIN  --  3.7 3.7   PROTTOTAL  --  6.9 6.7*   BILITOTAL  --  0.3 0.3   ALKPHOS  --  87 86   ALT  --  35 35   AST  --  24 28       Imaging:   No results found for this or any previous visit (from the past 24 hour(s)).

## 2021-02-21 ENCOUNTER — APPOINTMENT (OUTPATIENT)
Dept: PHYSICAL THERAPY | Facility: CLINIC | Age: 76
DRG: 025 | End: 2021-02-21
Payer: MEDICARE

## 2021-02-21 LAB
GLUCOSE BLDC GLUCOMTR-MCNC: 104 MG/DL (ref 70–99)
GLUCOSE BLDC GLUCOMTR-MCNC: 126 MG/DL (ref 70–99)
GLUCOSE BLDC GLUCOMTR-MCNC: 184 MG/DL (ref 70–99)
GLUCOSE BLDC GLUCOMTR-MCNC: 92 MG/DL (ref 70–99)
GLUCOSE BLDC GLUCOMTR-MCNC: 99 MG/DL (ref 70–99)

## 2021-02-21 PROCEDURE — 120N000001 HC R&B MED SURG/OB

## 2021-02-21 PROCEDURE — 250N000013 HC RX MED GY IP 250 OP 250 PS 637: Performed by: PHYSICIAN ASSISTANT

## 2021-02-21 PROCEDURE — 250N000011 HC RX IP 250 OP 636: Performed by: INTERNAL MEDICINE

## 2021-02-21 PROCEDURE — 97112 NEUROMUSCULAR REEDUCATION: CPT | Mod: GP

## 2021-02-21 PROCEDURE — 250N000013 HC RX MED GY IP 250 OP 250 PS 637: Performed by: INTERNAL MEDICINE

## 2021-02-21 PROCEDURE — 97750 PHYSICAL PERFORMANCE TEST: CPT | Mod: GP

## 2021-02-21 PROCEDURE — 999N001017 HC STATISTIC GLUCOSE BY METER IP

## 2021-02-21 PROCEDURE — 99232 SBSQ HOSP IP/OBS MODERATE 35: CPT | Performed by: INTERNAL MEDICINE

## 2021-02-21 PROCEDURE — 250N000012 HC RX MED GY IP 250 OP 636 PS 637: Performed by: NURSE PRACTITIONER

## 2021-02-21 PROCEDURE — 250N000012 HC RX MED GY IP 250 OP 636 PS 637: Performed by: INTERNAL MEDICINE

## 2021-02-21 RX ORDER — DEXAMETHASONE 4 MG/1
4 TABLET ORAL 3 TIMES DAILY
Status: DISCONTINUED | OUTPATIENT
Start: 2021-02-21 | End: 2021-02-23

## 2021-02-21 RX ORDER — AMLODIPINE BESYLATE 2.5 MG/1
2.5 TABLET ORAL DAILY
Status: DISCONTINUED | OUTPATIENT
Start: 2021-02-21 | End: 2021-02-22

## 2021-02-21 RX ADMIN — LABETALOL HYDROCHLORIDE 10 MG: 5 INJECTION, SOLUTION INTRAVENOUS at 08:31

## 2021-02-21 RX ADMIN — LORAZEPAM 0.5 MG: 0.5 TABLET ORAL at 21:05

## 2021-02-21 RX ADMIN — FAMOTIDINE 20 MG: 20 TABLET ORAL at 20:56

## 2021-02-21 RX ADMIN — BUSPIRONE HYDROCHLORIDE 30 MG: 15 TABLET ORAL at 20:56

## 2021-02-21 RX ADMIN — STANDARDIZED SENNA CONCENTRATE 2 TABLET: 8.6 TABLET ORAL at 08:38

## 2021-02-21 RX ADMIN — BUSPIRONE HYDROCHLORIDE 30 MG: 15 TABLET ORAL at 08:38

## 2021-02-21 RX ADMIN — DEXAMETHASONE 4 MG: 4 TABLET ORAL at 21:05

## 2021-02-21 RX ADMIN — FLUOXETINE 80 MG: 20 CAPSULE ORAL at 08:38

## 2021-02-21 RX ADMIN — ACETAMINOPHEN 500 MG: 500 TABLET, FILM COATED ORAL at 08:39

## 2021-02-21 RX ADMIN — HYDRALAZINE HYDROCHLORIDE 10 MG: 20 INJECTION INTRAMUSCULAR; INTRAVENOUS at 01:01

## 2021-02-21 RX ADMIN — FAMOTIDINE 20 MG: 20 TABLET ORAL at 08:39

## 2021-02-21 RX ADMIN — DEXAMETHASONE 4 MG: 4 TABLET ORAL at 14:03

## 2021-02-21 RX ADMIN — AMLODIPINE BESYLATE 2.5 MG: 2.5 TABLET ORAL at 09:03

## 2021-02-21 RX ADMIN — INSULIN ASPART 2 UNITS: 100 INJECTION, SOLUTION INTRAVENOUS; SUBCUTANEOUS at 18:24

## 2021-02-21 RX ADMIN — DEXAMETHASONE 4 MG: 4 TABLET ORAL at 00:39

## 2021-02-21 RX ADMIN — STANDARDIZED SENNA CONCENTRATE 2 TABLET: 8.6 TABLET ORAL at 20:56

## 2021-02-21 RX ADMIN — ACETAMINOPHEN 500 MG: 500 TABLET, FILM COATED ORAL at 21:05

## 2021-02-21 RX ADMIN — DEXAMETHASONE 4 MG: 4 TABLET ORAL at 06:32

## 2021-02-21 ASSESSMENT — ACTIVITIES OF DAILY LIVING (ADL)
ADLS_ACUITY_SCORE: 15

## 2021-02-21 NOTE — PLAN OF CARE
Pt here with Brain mass. A&Ox4. Neuros with intermittent RUE drift and BUE tremors. VSS on RA ex BP above parameters systolic less than 150- IV labetolol given. Tele NSR. regular diet, thin liquids. Takes pills whole with water. Up with SBA. Denies pain. Pt scoring green on the Aggression Stop Light Tool. Ativan given. Surgery scheduled for Tuesday with Zoila. Discharge pending progress.

## 2021-02-21 NOTE — PROGRESS NOTES
Neurosurgery progress note:  Patient states she's doing well today.  She denies headache or other complaints.  She's anxious about surgery and we had a long discussion regarding surgery, all questions answered.    On exam, she's sitting up in bed, awake and alert, speech clear.    Brain MRI (stereotactic) 2/19/21  IMPRESSION:  1. 3.3 x 2.8 x 2.8 cm enhancing mass in left frontal parietal region  with dural base. This could be intra-axial or extra-axial.  2. 0.5 x 1.0 x 1.0 cm enhancing mass in right occipital lobe which is  dural based. This appears to be extra-axial.    Plan:  Left parietal craniotomy for resection of tumor to be performed by Dr. Cummings 2/23/21.  Continue Decadron.  Pre-op orders entered.    JOSE Cobb  [facility] Neurosurgery  [address]  [address] [place] [id number]    Tel [phone]  Pager [phone]

## 2021-02-21 NOTE — PROGRESS NOTES
[facility]    Hospitalist Progress Note    Assessment & Plan   75 year old female who was admitted on 2/17/2021 with headaches, speech and reading problems, and found to have left frontal-parital mass, and an incidental meningioma:     Impression:   Principal Problem:    Brain mass, 3.3 cm left Frontal Parietal    -- on Decadron, will add Pepcid bid to prevent stress ulceration       Hyperglycemia, 2nd to Steroids   -- Insulin as needed      Hypertension -- mild, new   -- Norvasc 2.5 mg daily, increase dose as needed.       Leukocytosis -- probably 2nd to steroids    Active Problems:    Chronic obstructive pulmonary disease -- stable      Major depression   -- Wellbutrin held because of potential to lower seizure threshold, will continue Prozac      Anxiety   -- will add Ativan 0.5 mg tid prn      Plan:  Await definitive treatment plan per Neurosurgery (Anticipate Surgery next Tues with Dr. Cummings.       DVT Prophylaxis: Pneumatic Compression Devices  Code Status: Full Code    Disposition: Expected discharge in several days     Bonilla Tian MD  Pager [phone]  Cell Phone [phone]  Text Page (7am to 6pm)    Interval History   No headache today.  Writing still shaky but improving.      Physical Exam   Temp: 97.8  F (36.6  C) Temp src: Axillary BP: (!) 142/90 Pulse: 69   Resp: 14 SpO2: 95 % O2 Device: None (Room air)    Vitals:    02/18/21 0330 02/20/21 0506   Weight: 71.2 kg (157 lb) 68.4 kg (150 lb 12.8 oz)     Vital Signs with Ranges  Temp:  [97.7  F (36.5  C)-98.6  F (37  C)] 97.8  F (36.6  C)  Pulse:  [59-69] 69  Resp:  [14-16] 14  BP: (135-156)/(74-90) 142/90  SpO2:  [95 %-97 %] 95 %  No intake/output data recorded.    # Pain Assessment:  Current Pain Score 2/21/2021   Patient currently in pain? denies   Pain score (0-10) -   Olga negron pain level was assessed and she currently denies pain.        Constitutional: Awake, alert, cooperative, no apparent  distress  Respiratory: Clear to auscultation bilaterally, no crackles or wheezing  Cardiovascular: Regular rate and rhythm, normal S1 and S2, and no murmur noted  GI: Normal bowel sounds, soft, non-distended, non-tender  Extrem: No calf tenderness, no ankle edema  Neuro: Ox3, no focal motor or sensory deficits, slight tremor with both hands, and is right handed and has slight dysmetria with right hand FNF, and slow finger opposition.     Medications       amLODIPine  2.5 mg Oral Daily     busPIRone HCl  30 mg Oral BID     dexamethasone  4 mg Oral TID     famotidine  20 mg Oral BID     FLUoxetine  80 mg Oral Daily     insulin aspart  1-10 Units Subcutaneous TID AC     insulin aspart  1-7 Units Subcutaneous At Bedtime     sennosides  2 tablet Oral BID       Data   Recent Labs   Lab 02/20/21  0929 02/18/21  0735 02/17/21  1305   WBC 12.8* 7.0 6.9   HGB 12.2 12.1 11.7   MCV 91 90 90    343 353   INR  --  1.03 0.98    140 140   POTASSIUM 4.3 4.2 4.0   CHLORIDE 106 106 107   CO2 26 28 27   BUN 26 19 17   CR 0.77 0.74 0.72   ANIONGAP 4 6 6   ESTIVEN 9.7 9.4 9.3   * 132* 113*   ALBUMIN  --  3.7 3.7   PROTTOTAL  --  6.9 6.7*   BILITOTAL  --  0.3 0.3   ALKPHOS  --  87 86   ALT  --  35 35   AST  --  24 28       Imaging:   No results found for this or any previous visit (from the past 24 hour(s)).

## 2021-02-21 NOTE — PLAN OF CARE
Here with left parietal brain mass.  Neuros stable.  A/O x 4.  RUE pronator drift.  Unequal pupils L<R.  Tylenol for moderated headache this morning. Baseline BUE tremors.  Labatolol for elevated blood pressure this am, started on Norvasc, recheck 135/74.  Up in room independently.  Tele NSR.  Plan for OR on 2/23/21.

## 2021-02-21 NOTE — PROGRESS NOTES
02/21/21 0900   Signing Clinician's Name / Credentials   Signing clinician's name / credentials Flavia Nam, PT, DPT   Functional Gait Assessment (DK Denton., QUAN Mantilla F., et al. (2004))   1. GAIT LEVEL SURFACE 2   2. CHANGE IN GAIT SPEED 2   3. GAIT WITH HORIZONTAL HEAD TURNS 1   4. GAIT WITH VERTICAL HEAD TURNS 2   5. GAIT AND PIVOT TURN 2   6. STEP OVER OBSTACLE 1   7. GAIT WITH NARROW BASE OF SUPPORT 0   8. GAIT WITH EYES CLOSED 1   9. AMBULATING BACKWARDS 2   10. STEPS 2   Total Functional Gait Assessment Score   TOTAL SCORE: (MAXIMUM SCORE 30) 15       (>= 20 low falls risk indoors, >= 23 low falls risk outdoors)

## 2021-02-22 ENCOUNTER — APPOINTMENT (OUTPATIENT)
Dept: PHYSICAL THERAPY | Facility: CLINIC | Age: 76
DRG: 025 | End: 2021-02-22
Payer: MEDICARE

## 2021-02-22 ENCOUNTER — ANESTHESIA EVENT (OUTPATIENT)
Dept: SURGERY | Facility: CLINIC | Age: 76
DRG: 025 | End: 2021-02-22
Payer: MEDICARE

## 2021-02-22 LAB
ANION GAP SERPL CALCULATED.3IONS-SCNC: 4 MMOL/L (ref 3–14)
BUN SERPL-MCNC: 28 MG/DL (ref 7–30)
CALCIUM SERPL-MCNC: 9.1 MG/DL (ref 8.5–10.1)
CHLORIDE SERPL-SCNC: 105 MMOL/L (ref 94–109)
CO2 SERPL-SCNC: 27 MMOL/L (ref 20–32)
CREAT SERPL-MCNC: 0.77 MG/DL (ref 0.52–1.04)
ERYTHROCYTE [DISTWIDTH] IN BLOOD BY AUTOMATED COUNT: 13.5 % (ref 10–15)
GFR SERPL CREATININE-BSD FRML MDRD: 76 ML/MIN/{1.73_M2}
GLUCOSE BLDC GLUCOMTR-MCNC: 111 MG/DL (ref 70–99)
GLUCOSE BLDC GLUCOMTR-MCNC: 113 MG/DL (ref 70–99)
GLUCOSE BLDC GLUCOMTR-MCNC: 123 MG/DL (ref 70–99)
GLUCOSE BLDC GLUCOMTR-MCNC: 133 MG/DL (ref 70–99)
GLUCOSE BLDC GLUCOMTR-MCNC: 141 MG/DL (ref 70–99)
GLUCOSE SERPL-MCNC: 100 MG/DL (ref 70–99)
HCT VFR BLD AUTO: 35.8 % (ref 35–47)
HGB BLD-MCNC: 11.7 G/DL (ref 11.7–15.7)
INR PPP: 1.02 (ref 0.86–1.14)
MCH RBC QN AUTO: 29.1 PG (ref 26.5–33)
MCHC RBC AUTO-ENTMCNC: 32.7 G/DL (ref 31.5–36.5)
MCV RBC AUTO: 89 FL (ref 78–100)
PLATELET # BLD AUTO: 326 10E9/L (ref 150–450)
POTASSIUM SERPL-SCNC: 3.9 MMOL/L (ref 3.4–5.3)
RBC # BLD AUTO: 4.02 10E12/L (ref 3.8–5.2)
SODIUM SERPL-SCNC: 136 MMOL/L (ref 133–144)
WBC # BLD AUTO: 10.9 10E9/L (ref 4–11)

## 2021-02-22 PROCEDURE — 80048 BASIC METABOLIC PNL TOTAL CA: CPT | Performed by: INTERNAL MEDICINE

## 2021-02-22 PROCEDURE — 99232 SBSQ HOSP IP/OBS MODERATE 35: CPT | Performed by: INTERNAL MEDICINE

## 2021-02-22 PROCEDURE — 97110 THERAPEUTIC EXERCISES: CPT | Mod: GP

## 2021-02-22 PROCEDURE — 120N000001 HC R&B MED SURG/OB

## 2021-02-22 PROCEDURE — 250N000013 HC RX MED GY IP 250 OP 250 PS 637: Performed by: INTERNAL MEDICINE

## 2021-02-22 PROCEDURE — 85610 PROTHROMBIN TIME: CPT | Performed by: INTERNAL MEDICINE

## 2021-02-22 PROCEDURE — 999N001017 HC STATISTIC GLUCOSE BY METER IP

## 2021-02-22 PROCEDURE — 250N000013 HC RX MED GY IP 250 OP 250 PS 637: Performed by: PHYSICIAN ASSISTANT

## 2021-02-22 PROCEDURE — 250N000012 HC RX MED GY IP 250 OP 636 PS 637: Performed by: INTERNAL MEDICINE

## 2021-02-22 PROCEDURE — 97112 NEUROMUSCULAR REEDUCATION: CPT | Mod: GP

## 2021-02-22 PROCEDURE — 36415 COLL VENOUS BLD VENIPUNCTURE: CPT | Performed by: INTERNAL MEDICINE

## 2021-02-22 PROCEDURE — 85027 COMPLETE CBC AUTOMATED: CPT | Performed by: INTERNAL MEDICINE

## 2021-02-22 RX ORDER — NALOXONE HYDROCHLORIDE 0.4 MG/ML
0.2 INJECTION, SOLUTION INTRAMUSCULAR; INTRAVENOUS; SUBCUTANEOUS
Status: DISCONTINUED | OUTPATIENT
Start: 2021-02-22 | End: 2021-03-01 | Stop reason: HOSPADM

## 2021-02-22 RX ORDER — NALOXONE HYDROCHLORIDE 0.4 MG/ML
0.4 INJECTION, SOLUTION INTRAMUSCULAR; INTRAVENOUS; SUBCUTANEOUS
Status: DISCONTINUED | OUTPATIENT
Start: 2021-02-22 | End: 2021-03-01 | Stop reason: HOSPADM

## 2021-02-22 RX ORDER — SODIUM CHLORIDE, SODIUM LACTATE, POTASSIUM CHLORIDE, CALCIUM CHLORIDE 600; 310; 30; 20 MG/100ML; MG/100ML; MG/100ML; MG/100ML
INJECTION, SOLUTION INTRAVENOUS CONTINUOUS
Status: DISCONTINUED | OUTPATIENT
Start: 2021-02-23 | End: 2021-02-25

## 2021-02-22 RX ORDER — AMLODIPINE BESYLATE 5 MG/1
5 TABLET ORAL DAILY
Status: DISCONTINUED | OUTPATIENT
Start: 2021-02-22 | End: 2021-02-26

## 2021-02-22 RX ORDER — HYDROMORPHONE HYDROCHLORIDE 1 MG/ML
.3-.5 INJECTION, SOLUTION INTRAMUSCULAR; INTRAVENOUS; SUBCUTANEOUS EVERY 4 HOURS PRN
Status: DISCONTINUED | OUTPATIENT
Start: 2021-02-22 | End: 2021-03-01 | Stop reason: HOSPADM

## 2021-02-22 RX ADMIN — AMLODIPINE BESYLATE 5 MG: 5 TABLET ORAL at 08:19

## 2021-02-22 RX ADMIN — FAMOTIDINE 20 MG: 20 TABLET ORAL at 08:19

## 2021-02-22 RX ADMIN — DEXAMETHASONE 4 MG: 4 TABLET ORAL at 08:19

## 2021-02-22 RX ADMIN — FAMOTIDINE 20 MG: 20 TABLET ORAL at 20:12

## 2021-02-22 RX ADMIN — DEXAMETHASONE 4 MG: 4 TABLET ORAL at 22:20

## 2021-02-22 RX ADMIN — FLUOXETINE 80 MG: 20 CAPSULE ORAL at 08:18

## 2021-02-22 RX ADMIN — DEXAMETHASONE 4 MG: 4 TABLET ORAL at 16:02

## 2021-02-22 RX ADMIN — ACETAMINOPHEN 500 MG: 500 TABLET, FILM COATED ORAL at 22:20

## 2021-02-22 RX ADMIN — ACETAMINOPHEN 500 MG: 500 TABLET, FILM COATED ORAL at 08:18

## 2021-02-22 RX ADMIN — BUSPIRONE HYDROCHLORIDE 30 MG: 15 TABLET ORAL at 08:19

## 2021-02-22 RX ADMIN — LORAZEPAM 0.5 MG: 0.5 TABLET ORAL at 22:20

## 2021-02-22 RX ADMIN — BUSPIRONE HYDROCHLORIDE 30 MG: 15 TABLET ORAL at 20:12

## 2021-02-22 ASSESSMENT — ACTIVITIES OF DAILY LIVING (ADL)
ADLS_ACUITY_SCORE: 15

## 2021-02-22 ASSESSMENT — MIFFLIN-ST. JEOR: SCORE: 1145.98

## 2021-02-22 NOTE — PROGRESS NOTES
"SPIRITUAL HEALTH SERVICES Progress Note  [facility]    Referral Source: Follow-up visit.    PT (Olga) expresses feelings of anxiety about her pending surgery tomorrow (02/23/21). Olga disclosed that she recently purchased a home and felt things were going very well until her current medical condition presented itself. She characterized the situation as \"very unfair\" and compared it to other episodes in her life, in particular her marriage and the lost opportunity of returning at some point to [place] where she grew up.  Olga also expressed some feeling of remorse about her relationship with her three children, indicating that \"I wish I'd done a better job raising my children with some things.\" Olga also expressed feelings of anxiety and anger about her relationship with her former spouse. She stated emphatically that she has no wish to see or speak to her former spouse while she is in hospital.    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 She stated that she has a close relationship with her daughter and several grandchildren. She disclosed that her relationship with her two sons is \"not great but better than it used to be.\"    Olga indicates that she relies on her Restorationist martin and that God has seen her through many difficulties and that she believes it will this time as well. She also stated that she recognizes the possibility that \"my time has come\" and that she can accept this if it is \"God's will.\"    Provided emotional and spiritual support through affirmation of concerns, life review and reflective conversation, and prayer.    PT is aware of the availability of  services by request. Per request by PT, follow-up prior to surgical procedure tomorrow.      Doug Silver  Chaplain Resident   "

## 2021-02-22 NOTE — PLAN OF CARE
Pt here with left frontal/parietal mass - surgery scheduled tomorrow with Dr Cummings. A&O x4. Neuros include right pupil< left pupil, WFD, and  intermittent RUE drift. VSS. Regular diet, thin liquids. Takes pills whole. Up independently in room. Tylenol given x1 for headache pain. Pt scoring green on the Aggression Stop Light Tool. Discharge plan pending medical readiness.

## 2021-02-22 NOTE — PROGRESS NOTES
Here with left parietal brain mass. A/O x 4.  Neuros: RUE pronator drift.  Unequal pupils L<R.Baseline BUE tremors. VSS on RA.Up in room independently. Tele NSR.  Plan for OR on 2/23/21.  PRN Ativan and Tylenol given per patient request.

## 2021-02-22 NOTE — PROGRESS NOTES
[facility]    Internal Medicine Hospitalist Progress Note  02/22/2021  I evaluated patient on the above date.    Anton Diane Jr., MD  [phone] (p)  Text Page        Assessment & Plan New actions/orders today (02/22/2021) are underlined.    75 year old female with history including asthma, depression/anxiety and right occipital meningioma, who presented 2/17/2021 with recent headache, speech and reading problems and was found by MRI with a new enhancing left frontal-parital mass concerning for primary brain neoplasm.    Enhancing left frontal-parietal mass with surrounding edema, concerning for primary brain neoplasm.  * Pt with issues wit headache, speech/language impairment. MRI ordered in clinic 2/17 an enhancing 3.3 x 2.7 x 2.9 cm intra-axial mass in the lateral left frontoparietal region with signs of moderate amount of edema w/o midline shift, overall concerning for high grade primary brain neoplasm; unchanged small right occipital meningioma. Subsequently sent to [facility] and admitted.   * Started on IV dexamethasone. Neurosurgery consulted. CT CAP 2/18 did not show signs of a primary malignancy. MRI brain with contrast 2/19 showed a 3.3 x 2.8 x 2.8 cm enhancing mass in left frontal parietal region with dural base, could be intra-axial or extra-axial; 0.5 x 1.0 x 1.0 cm enhancing mass in right occipital lobe which was dural based, appeared to be extra-axial.  - Plan left parietal craniotomy for resection of tumor 2/23/21; plan to go to ICU post-op.  - Continue IV dexamethasone 4 mg TID.  - Continue famotidine for GI prophylaxis.  - Continue PRN acetaminophen.  - PRN IV Dilaudid 0.3-0.5 mg q4h for severe pain.   - Order IVF's to start tomorrow morning (NPO after midnight; surgery planned for early afternoon).    Hyperglycemia, secondary to steroids.  Hgb A1C 5.4 2/18/2021.   Recent Labs   Lab 02/22/21  0203 02/21/21  2135 02/21/21  1705 02/21/21  1318 02/21/21  0809 02/21/21  0159  02/20/21  0929 02/20/21  0929 02/18/21  0735 02/18/21  0735 02/17/21  1305   GLC  --   --   --   --   --   --   --  127*  --  132* 113*   * 92 184* 99 104* 126*   < >  --    < >  --   --     < > = values in this interval not displayed.   - Continue ISS.    Hypertension, possibly due to steroids or underlying benign essential HTN.  BP's frequently elevated this hospitalization. Started on amlodipine 2/21.  - Continue amlodipine 5 mg daily (was increased today 2/22).  - Continue PRN IV hydralazine and PRN IV labetalol.    Leukocytosis, suspect due to steroids.  No clear signs of infection.  Recent Labs   Lab 02/20/21  0929 02/18/21  0735 02/17/21  1305   WBC 12.8* 7.0 6.9   - Monitor clinically for signs of infection.  - Monitor CBC.    Depression/anxiety.  Bupropion held because of potential to lower seizure threshold. Started on PRN lorazepam this hospitalization.  - Continue buspirone, fluoxetine.   - Continue PRN PO lorazepam 0.5 mg TID.    Asthma.  Not on bronchodilators PTA.  - Monitor clinically.      COVID-19 testing.  COVID-19 PCR Results    COVID-19 PCR Results 8/8/20 2/17/21   COVID-19 Virus by PCR (External Result) Not Detected    SARS-CoV-2 Virus Specimen Source  Nasopharyngeal   SARS-CoV-2 PCR Result  NEGATIVE      Comments are available for some flowsheets but are not being displayed.         COVID-19 Antibody Results, Testing for Immunity    COVID-19 Antibody Results, Testing for Immunity   No data to display.             Diet: Combination Diet Regular Diet Adult    Prophylaxis: PCD's, ambulation.   Martino Catheter: not present  Code Status: Full Code    Disposition Plan   Expected discharge: 3-5d, recommended to likely TCU pending post-surgical recovery.  Entered: Anton Diane MD 02/22/2021, 7:05 AM     Communication.  - I d/w pt's daughter 2/22.      Interval History   Stable/some improvement neurologically.  Still some difficulties writing and some word finding difficulties.  Notes  "headache, has been taking acetaminophen.  No chest pain or dyspnea.  Eating OK.    -Data reviewed today: I reviewed all new labs and imaging over the last 24 hours. I personally reviewed no images or EKG's today.    Physical Exam    , Blood pressure 138/79, pulse 62, temperature 97.8  F (36.6  C), temperature source Oral, resp. rate 16, height 1.588 m (5' 2.52\"), weight 68.9 kg (152 lb), SpO2 94 %.  Vitals:    02/18/21 0330 02/20/21 0506 02/22/21 0625   Weight: 71.2 kg (157 lb) 68.4 kg (150 lb 12.8 oz) 68.9 kg (152 lb)     Vital Signs with Ranges  Temp:  [97.7  F (36.5  C)-98.2  F (36.8  C)] 97.8  F (36.6  C)  Pulse:  [60-69] 62  Resp:  [14-16] 16  BP: (135-157)/(72-90) 138/79  SpO2:  [93 %-97 %] 94 %  Patient Vitals for the past 24 hrs:   BP Temp Temp src Pulse Resp SpO2 Weight   02/22/21 0625 -- -- -- -- -- -- 68.9 kg (152 lb)   02/22/21 0349 138/79 97.8  F (36.6  C) Oral 62 16 94 % --   02/22/21 0149 (!) 146/87 -- -- -- -- -- --   02/21/21 2322 (!) 150/84 -- -- -- -- -- --   02/21/21 2319 (!) 157/82 98.1  F (36.7  C) Oral 60 16 97 % --   02/21/21 1921 (!) 142/72 98.2  F (36.8  C) Oral 66 16 93 % --   02/21/21 1527 (!) 142/90 97.8  F (36.6  C) Axillary 69 14 95 % --   02/21/21 1124 135/74 97.9  F (36.6  C) Oral 64 14 96 % --   02/21/21 0830 (!) 152/85 -- -- -- -- -- --   02/21/21 0817 (!) 156/82 97.7  F (36.5  C) Oral 65 16 95 % --     I/O's Last 24 hours  No intake/output data recorded.    Constitutional: Awake, alert, pleasant.  Respiratory: Diminished in bases. No crackles or wheezes.  Cardiovascular: RRR, no m/r/g.  GI: Soft, nt, nd, +BS.  Skin/Integumen:   Other:        Data   Recent Labs   Lab 02/20/21  0929 02/18/21  0735 02/17/21  1305   WBC 12.8* 7.0 6.9   HGB 12.2 12.1 11.7   MCV 91 90 90    343 353   INR  --  1.03 0.98    140 140   POTASSIUM 4.3 4.2 4.0   CHLORIDE 106 106 107   CO2 26 28 27   BUN 26 19 17   CR 0.77 0.74 0.72   ANIONGAP 4 6 6   ESTIVEN 9.7 9.4 9.3   * 132* 113*   ALBUMIN  " --  3.7 3.7   PROTTOTAL  --  6.9 6.7*   BILITOTAL  --  0.3 0.3   ALKPHOS  --  87 86   ALT  --  35 35   AST  --  24 28     Recent Labs   Lab Test 02/22/21  0203 02/21/21  2135 02/21/21  1705 02/21/21  1318 02/21/21  0809 02/20/21  0929 02/20/21  0929 02/18/21  0735 02/18/21  0735 02/17/21  1305   GLC  --   --   --   --   --   --  127*  --  132* 113*   * 92 184* 99 104*   < >  --    < >  --   --     < > = values in this interval not displayed.     No results for input(s): CRP, DD, LDH, FIBR, GUS, IL6B in the last 168 hours.      No results found for this or any previous visit (from the past 24 hour(s)).    Medications   All medications were reviewed.      amLODIPine  5 mg Oral Daily     busPIRone HCl  30 mg Oral BID     dexamethasone  4 mg Oral TID     famotidine  20 mg Oral BID     FLUoxetine  80 mg Oral Daily     insulin aspart  1-10 Units Subcutaneous TID AC     insulin aspart  1-7 Units Subcutaneous At Bedtime     sennosides  2 tablet Oral BID     acetaminophen, glucose **OR** dextrose **OR** glucagon, hydrALAZINE, labetalol, lidocaine 4%, lidocaine (buffered or not buffered), LORazepam, melatonin, ondansetron **OR** ondansetron, polyethylene glycol, sodium chloride (PF), sodium chloride (PF)

## 2021-02-22 NOTE — PLAN OF CARE
Pt here with L frontoparietal brain mass. AOx4. Pleasant and cooperative. Neuros with L pupil larger than R pupil, reactive, slight R pronator drift. VSS on on RA. Tele SB. Regular diet, thin liquids. Takes pills whole. SBA. Denies pain. Pt scoring green on the Aggression Stop Light Tool. Plan for L parietal crani Tuesday with Dr. Cummings.

## 2021-02-22 NOTE — PROGRESS NOTES
"[facility]    Neurosurgery Progress Note    Date of Service (when I saw the patient): 02/22/2021     Assessment & Plan   Olga Bailey is a 75 year old female with a history of skin carcinoma and meningioma (followed by neurologist Dr. Jayjay Tomlin) who was admitted on 2/17/2021. She presented with a brain mass. Today she was seen sitting up in bed. She is alert and follows commands appropriately. Continues to have some difficulty with writing, however has noted improvement while on decadron. She is anxious about the new findings and upcoming surgery.    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 Stable small meningioma long the posterior right occipital convexity    Plan:   -Continue decadron  -To OR with Dr. Cummings tomorrow, NPO after midnight. Pre op orders in place.    Funmilayo Hampton, DEB  [facility]  [place] [address]    Tel [phone]  Pager [phone]      Interval History   Stable.    Physical Exam   Temp: 97.6  F (36.4  C) Temp src: Oral BP: (!) 164/93 Pulse: 71   Resp: 17 SpO2: 95 % O2 Device: None (Room air)    Vitals:    02/18/21 0330 02/20/21 0506 02/22/21 0625   Weight: 157 lb (71.2 kg) 150 lb 12.8 oz (68.4 kg) 152 lb (68.9 kg)     Vital Signs with Ranges  Temp:  [97.6  F (36.4  C)-98.2  F (36.8  C)] 97.6  F (36.4  C)  Pulse:  [60-71] 71  Resp:  [14-17] 17  BP: (135-164)/(72-93) 164/93  SpO2:  [93 %-97 %] 95 %  No intake/output data recorded.     , Blood pressure (!) 164/93, pulse 71, temperature 97.6  F (36.4  C), temperature source Oral, resp. rate 17, height 5' 2.52\" (1.588 m), weight 152 lb (68.9 kg), SpO2 95 %.  152 lbs 0 oz  HEENT:  Normocephalic.    Heart:  No peripheral " edema  Lungs:  No SOB  Skin:  Warm and dry, good capillary refill.  Extremities:  Good radial and dorsalis pedis pulses bilaterally, no edema, cyanosis or clubbing.    NEUROLOGICAL EXAMINATION:   Mental status:  Alert and follows commands.  Motor:  Strength is 5/5 throughout the upper and lower extremities    Medications     [START ON 2/23/2021] lactated ringers         amLODIPine  5 mg Oral Daily     busPIRone HCl  30 mg Oral BID     dexamethasone  4 mg Oral TID     famotidine  20 mg Oral BID     FLUoxetine  80 mg Oral Daily     insulin aspart  1-10 Units Subcutaneous TID AC     insulin aspart  1-7 Units Subcutaneous At Bedtime     sennosides  2 tablet Oral BID       Data     CBC RESULTS:   Recent Labs   Lab Test 02/18/21  0735   WBC 7.0   RBC 4.17   HGB 12.1   HCT 37.6   MCV 90   MCH 29.0   MCHC 32.2   RDW 13.5        Basic Metabolic Panel:  Lab Results   Component Value Date     02/18/2021      Lab Results   Component Value Date    POTASSIUM 4.2 02/18/2021     Lab Results   Component Value Date    CHLORIDE 106 02/18/2021     Lab Results   Component Value Date    ESTIVEN 9.4 02/18/2021     Lab Results   Component Value Date    CO2 28 02/18/2021     Lab Results   Component Value Date    BUN 19 02/18/2021     Lab Results   Component Value Date    CR 0.74 02/18/2021     Lab Results   Component Value Date     02/18/2021     INR:  Lab Results   Component Value Date    INR 1.03 02/18/2021    INR 0.98 02/17/2021

## 2021-02-23 ENCOUNTER — ANESTHESIA (OUTPATIENT)
Dept: SURGERY | Facility: CLINIC | Age: 76
DRG: 025 | End: 2021-02-23
Payer: MEDICARE

## 2021-02-23 ENCOUNTER — APPOINTMENT (OUTPATIENT)
Dept: GENERAL RADIOLOGY | Facility: CLINIC | Age: 76
DRG: 025 | End: 2021-02-23
Attending: INTERNAL MEDICINE
Payer: MEDICARE

## 2021-02-23 LAB
ABO + RH BLD: NORMAL
ABO + RH BLD: NORMAL
BLD GP AB SCN SERPL QL: NORMAL
BLOOD BANK CMNT PATIENT-IMP: NORMAL
GLUCOSE BLDC GLUCOMTR-MCNC: 110 MG/DL (ref 70–99)
GLUCOSE BLDC GLUCOMTR-MCNC: 117 MG/DL (ref 70–99)
GLUCOSE BLDC GLUCOMTR-MCNC: 122 MG/DL (ref 70–99)
GLUCOSE BLDC GLUCOMTR-MCNC: 93 MG/DL (ref 70–99)
GLUCOSE BLDC GLUCOMTR-MCNC: 93 MG/DL (ref 70–99)
GLUCOSE BLDC GLUCOMTR-MCNC: 94 MG/DL (ref 70–99)
SPECIMEN EXP DATE BLD: NORMAL

## 2021-02-23 PROCEDURE — 250N000011 HC RX IP 250 OP 636: Performed by: INTERNAL MEDICINE

## 2021-02-23 PROCEDURE — 258N000003 HC RX IP 258 OP 636: Performed by: SURGERY

## 2021-02-23 PROCEDURE — 99233 SBSQ HOSP IP/OBS HIGH 50: CPT | Performed by: HOSPITALIST

## 2021-02-23 PROCEDURE — 258N000001 HC RX 258: Performed by: NEUROLOGICAL SURGERY

## 2021-02-23 PROCEDURE — 36415 COLL VENOUS BLD VENIPUNCTURE: CPT | Performed by: SURGERY

## 2021-02-23 PROCEDURE — 88307 TISSUE EXAM BY PATHOLOGIST: CPT | Mod: TC | Performed by: NEUROLOGICAL SURGERY

## 2021-02-23 PROCEDURE — 88341 IMHCHEM/IMCYTCHM EA ADD ANTB: CPT | Mod: TC | Performed by: NEUROLOGICAL SURGERY

## 2021-02-23 PROCEDURE — 250N000013 HC RX MED GY IP 250 OP 250 PS 637: Performed by: ANESTHESIOLOGY

## 2021-02-23 PROCEDURE — 86850 RBC ANTIBODY SCREEN: CPT | Performed by: SURGERY

## 2021-02-23 PROCEDURE — C1713 ANCHOR/SCREW BN/BN,TIS/BN: HCPCS | Performed by: NEUROLOGICAL SURGERY

## 2021-02-23 PROCEDURE — 8E09XBG COMPUTER ASSISTED PROCEDURE OF HEAD AND NECK REGION, WITH COMPUTERIZED TOMOGRAPHY: ICD-10-PCS | Performed by: NEUROLOGICAL SURGERY

## 2021-02-23 PROCEDURE — 250N000025 HC SEVOFLURANE, PER MIN: Performed by: NEUROLOGICAL SURGERY

## 2021-02-23 PROCEDURE — 250N000011 HC RX IP 250 OP 636: Performed by: PHYSICIAN ASSISTANT

## 2021-02-23 PROCEDURE — 88331 PATH CONSLTJ SURG 1 BLK 1SPC: CPT | Mod: TC | Performed by: NEUROLOGICAL SURGERY

## 2021-02-23 PROCEDURE — 250N000013 HC RX MED GY IP 250 OP 250 PS 637: Performed by: INTERNAL MEDICINE

## 2021-02-23 PROCEDURE — 93005 ELECTROCARDIOGRAM TRACING: CPT

## 2021-02-23 PROCEDURE — 999N000141 HC STATISTIC PRE-PROCEDURE NURSING ASSESSMENT: Performed by: NEUROLOGICAL SURGERY

## 2021-02-23 PROCEDURE — 360N000086 HC SURGERY LEVEL 6 W/ FLUORO, PER MIN: Performed by: NEUROLOGICAL SURGERY

## 2021-02-23 PROCEDURE — 999N000179 XR SURGERY CARM FLUORO LESS THAN 5 MIN W STILLS

## 2021-02-23 PROCEDURE — 250N000011 HC RX IP 250 OP 636: Performed by: NURSE ANESTHETIST, CERTIFIED REGISTERED

## 2021-02-23 PROCEDURE — 999N001017 HC STATISTIC GLUCOSE BY METER IP

## 2021-02-23 PROCEDURE — 250N000009 HC RX 250: Performed by: ANESTHESIOLOGY

## 2021-02-23 PROCEDURE — 250N000009 HC RX 250: Performed by: NEUROLOGICAL SURGERY

## 2021-02-23 PROCEDURE — 272N000001 HC OR GENERAL SUPPLY STERILE: Performed by: NEUROLOGICAL SURGERY

## 2021-02-23 PROCEDURE — 61781 SCAN PROC CRANIAL INTRA: CPT | Performed by: NEUROLOGICAL SURGERY

## 2021-02-23 PROCEDURE — 710N000010 HC RECOVERY PHASE 1, LEVEL 2, PER MIN: Performed by: NEUROLOGICAL SURGERY

## 2021-02-23 PROCEDURE — 250N000012 HC RX MED GY IP 250 OP 636 PS 637: Performed by: INTERNAL MEDICINE

## 2021-02-23 PROCEDURE — 250N000009 HC RX 250: Performed by: NURSE ANESTHETIST, CERTIFIED REGISTERED

## 2021-02-23 PROCEDURE — 250N000012 HC RX MED GY IP 250 OP 636 PS 637: Performed by: ANESTHESIOLOGY

## 2021-02-23 PROCEDURE — 250N000011 HC RX IP 250 OP 636: Performed by: NEUROLOGICAL SURGERY

## 2021-02-23 PROCEDURE — 999N001020 HC STATISTIC H-SEND OUTS PREP: Performed by: NEUROLOGICAL SURGERY

## 2021-02-23 PROCEDURE — 88341 IMHCHEM/IMCYTCHM EA ADD ANTB: CPT | Mod: 26 | Performed by: SPECIALIST

## 2021-02-23 PROCEDURE — 200N000001 HC R&B ICU

## 2021-02-23 PROCEDURE — 88342 IMHCHEM/IMCYTCHM 1ST ANTB: CPT | Mod: TC | Performed by: NEUROLOGICAL SURGERY

## 2021-02-23 PROCEDURE — 69990 MICROSURGERY ADD-ON: CPT | Mod: 59 | Performed by: NEUROLOGICAL SURGERY

## 2021-02-23 PROCEDURE — 250N000013 HC RX MED GY IP 250 OP 250 PS 637: Performed by: PHYSICIAN ASSISTANT

## 2021-02-23 PROCEDURE — 999N000010 HC STATISTIC ANES STAT CODE-CRNA PER MINUTE: Performed by: NEUROLOGICAL SURGERY

## 2021-02-23 PROCEDURE — 88342 IMHCHEM/IMCYTCHM 1ST ANTB: CPT | Mod: 26 | Performed by: SPECIALIST

## 2021-02-23 PROCEDURE — 258N000003 HC RX IP 258 OP 636: Performed by: INTERNAL MEDICINE

## 2021-02-23 PROCEDURE — 86901 BLOOD TYPING SEROLOGIC RH(D): CPT | Performed by: SURGERY

## 2021-02-23 PROCEDURE — 93010 ELECTROCARDIOGRAM REPORT: CPT | Performed by: INTERNAL MEDICINE

## 2021-02-23 PROCEDURE — 86900 BLOOD TYPING SEROLOGIC ABO: CPT | Performed by: SURGERY

## 2021-02-23 PROCEDURE — 61510 CRNEC TREPH EXC BRN TUM STTL: CPT | Performed by: NEUROLOGICAL SURGERY

## 2021-02-23 PROCEDURE — 00B70ZZ EXCISION OF CEREBRAL HEMISPHERE, OPEN APPROACH: ICD-10-PCS | Performed by: NEUROLOGICAL SURGERY

## 2021-02-23 PROCEDURE — 00B70ZX EXCISION OF CEREBRAL HEMISPHERE, OPEN APPROACH, DIAGNOSTIC: ICD-10-PCS | Performed by: NEUROLOGICAL SURGERY

## 2021-02-23 PROCEDURE — 88307 TISSUE EXAM BY PATHOLOGIST: CPT | Mod: 26 | Performed by: SPECIALIST

## 2021-02-23 PROCEDURE — 370N000017 HC ANESTHESIA TECHNICAL FEE, PER MIN: Performed by: NEUROLOGICAL SURGERY

## 2021-02-23 PROCEDURE — C1763 CONN TISS, NON-HUMAN: HCPCS | Performed by: NEUROLOGICAL SURGERY

## 2021-02-23 DEVICE — GRAFT DURAGEN 2X2" ID220: Type: IMPLANTABLE DEVICE | Site: CRANIAL | Status: FUNCTIONAL

## 2021-02-23 DEVICE — IMP PLATE SYN BURR HOLE COVER 17MM 04.503.023: Type: IMPLANTABLE DEVICE | Site: CRANIAL | Status: FUNCTIONAL

## 2021-02-23 DEVICE — IMP SCR SYN MATRIX LOW PRO 1.5X04MM SELF DRILL 04.503.104.01: Type: IMPLANTABLE DEVICE | Site: CRANIAL | Status: FUNCTIONAL

## 2021-02-23 DEVICE — IMP PLATE SYN BOX 4 HOLE 14X14MM 04.503.065: Type: IMPLANTABLE DEVICE | Site: CRANIAL | Status: FUNCTIONAL

## 2021-02-23 RX ORDER — CEFAZOLIN SODIUM 1 G/3ML
1 INJECTION, POWDER, FOR SOLUTION INTRAMUSCULAR; INTRAVENOUS EVERY 8 HOURS
Status: COMPLETED | OUTPATIENT
Start: 2021-02-24 | End: 2021-02-24

## 2021-02-23 RX ORDER — PROPOFOL 10 MG/ML
INJECTION, EMULSION INTRAVENOUS CONTINUOUS PRN
Status: DISCONTINUED | OUTPATIENT
Start: 2021-02-23 | End: 2021-02-23

## 2021-02-23 RX ORDER — DEXAMETHASONE SODIUM PHOSPHATE 4 MG/ML
INJECTION, SOLUTION INTRA-ARTICULAR; INTRALESIONAL; INTRAMUSCULAR; INTRAVENOUS; SOFT TISSUE PRN
Status: DISCONTINUED | OUTPATIENT
Start: 2021-02-23 | End: 2021-02-23

## 2021-02-23 RX ORDER — FENTANYL CITRATE 50 UG/ML
INJECTION, SOLUTION INTRAMUSCULAR; INTRAVENOUS PRN
Status: DISCONTINUED | OUTPATIENT
Start: 2021-02-23 | End: 2021-02-23

## 2021-02-23 RX ORDER — CEFAZOLIN SODIUM 2 G/100ML
2 INJECTION, SOLUTION INTRAVENOUS SEE ADMIN INSTRUCTIONS
Status: DISCONTINUED | OUTPATIENT
Start: 2021-02-23 | End: 2021-02-23 | Stop reason: HOSPADM

## 2021-02-23 RX ORDER — EPHEDRINE SULFATE 50 MG/ML
INJECTION, SOLUTION INTRAMUSCULAR; INTRAVENOUS; SUBCUTANEOUS PRN
Status: DISCONTINUED | OUTPATIENT
Start: 2021-02-23 | End: 2021-02-23

## 2021-02-23 RX ORDER — NALOXONE HYDROCHLORIDE 0.4 MG/ML
0.2 INJECTION, SOLUTION INTRAMUSCULAR; INTRAVENOUS; SUBCUTANEOUS
Status: DISCONTINUED | OUTPATIENT
Start: 2021-02-23 | End: 2021-02-23

## 2021-02-23 RX ORDER — NICARDIPINE HYDROCHLORIDE 0.2 MG/ML
2.5-15 INJECTION INTRAVENOUS CONTINUOUS
Status: DISCONTINUED | OUTPATIENT
Start: 2021-02-23 | End: 2021-02-25

## 2021-02-23 RX ORDER — NICARDIPINE HYDROCHLORIDE 0.2 MG/ML
2.5-15 INJECTION INTRAVENOUS CONTINUOUS
Status: DISCONTINUED | OUTPATIENT
Start: 2021-02-23 | End: 2021-02-23

## 2021-02-23 RX ORDER — HYDROMORPHONE HYDROCHLORIDE 1 MG/ML
.3-.5 INJECTION, SOLUTION INTRAMUSCULAR; INTRAVENOUS; SUBCUTANEOUS EVERY 5 MIN PRN
Status: DISCONTINUED | OUTPATIENT
Start: 2021-02-23 | End: 2021-02-23

## 2021-02-23 RX ORDER — ONDANSETRON 4 MG/1
4 TABLET, ORALLY DISINTEGRATING ORAL EVERY 30 MIN PRN
Status: DISCONTINUED | OUTPATIENT
Start: 2021-02-23 | End: 2021-02-23

## 2021-02-23 RX ORDER — APREPITANT 40 MG/1
40 CAPSULE ORAL ONCE
Status: COMPLETED | OUTPATIENT
Start: 2021-02-23 | End: 2021-02-23

## 2021-02-23 RX ORDER — PROPOFOL 10 MG/ML
INJECTION, EMULSION INTRAVENOUS PRN
Status: DISCONTINUED | OUTPATIENT
Start: 2021-02-23 | End: 2021-02-23

## 2021-02-23 RX ORDER — MANNITOL 20 G/100ML
INJECTION, SOLUTION INTRAVENOUS PRN
Status: DISCONTINUED | OUTPATIENT
Start: 2021-02-23 | End: 2021-02-23

## 2021-02-23 RX ORDER — CEFAZOLIN SODIUM 2 G/100ML
2 INJECTION, SOLUTION INTRAVENOUS
Status: COMPLETED | OUTPATIENT
Start: 2021-02-23 | End: 2021-02-23

## 2021-02-23 RX ORDER — LIDOCAINE 40 MG/G
CREAM TOPICAL
Status: DISCONTINUED | OUTPATIENT
Start: 2021-02-23 | End: 2021-03-01 | Stop reason: HOSPADM

## 2021-02-23 RX ORDER — GLYCOPYRROLATE 0.2 MG/ML
INJECTION, SOLUTION INTRAMUSCULAR; INTRAVENOUS PRN
Status: DISCONTINUED | OUTPATIENT
Start: 2021-02-23 | End: 2021-02-23

## 2021-02-23 RX ORDER — DEXAMETHASONE SODIUM PHOSPHATE 4 MG/ML
4 INJECTION, SOLUTION INTRA-ARTICULAR; INTRALESIONAL; INTRAMUSCULAR; INTRAVENOUS; SOFT TISSUE EVERY 6 HOURS
Status: DISCONTINUED | OUTPATIENT
Start: 2021-02-23 | End: 2021-02-27

## 2021-02-23 RX ORDER — LEVETIRACETAM 5 MG/ML
500 INJECTION INTRAVASCULAR EVERY 12 HOURS
Status: DISCONTINUED | OUTPATIENT
Start: 2021-02-23 | End: 2021-02-26

## 2021-02-23 RX ORDER — LIDOCAINE HYDROCHLORIDE 20 MG/ML
INJECTION, SOLUTION INFILTRATION; PERINEURAL PRN
Status: DISCONTINUED | OUTPATIENT
Start: 2021-02-23 | End: 2021-02-23

## 2021-02-23 RX ORDER — ONDANSETRON 2 MG/ML
4 INJECTION INTRAMUSCULAR; INTRAVENOUS EVERY 30 MIN PRN
Status: DISCONTINUED | OUTPATIENT
Start: 2021-02-23 | End: 2021-02-23

## 2021-02-23 RX ORDER — SODIUM CHLORIDE, SODIUM LACTATE, POTASSIUM CHLORIDE, CALCIUM CHLORIDE 600; 310; 30; 20 MG/100ML; MG/100ML; MG/100ML; MG/100ML
INJECTION, SOLUTION INTRAVENOUS CONTINUOUS
Status: DISCONTINUED | OUTPATIENT
Start: 2021-02-23 | End: 2021-02-23 | Stop reason: HOSPADM

## 2021-02-23 RX ORDER — NALOXONE HYDROCHLORIDE 0.4 MG/ML
0.4 INJECTION, SOLUTION INTRAMUSCULAR; INTRAVENOUS; SUBCUTANEOUS
Status: DISCONTINUED | OUTPATIENT
Start: 2021-02-23 | End: 2021-02-23

## 2021-02-23 RX ORDER — HYDROXYZINE HYDROCHLORIDE 25 MG/1
25 TABLET, FILM COATED ORAL ONCE
Status: COMPLETED | OUTPATIENT
Start: 2021-02-23 | End: 2021-02-23

## 2021-02-23 RX ORDER — MAGNESIUM HYDROXIDE 1200 MG/15ML
LIQUID ORAL PRN
Status: DISCONTINUED | OUTPATIENT
Start: 2021-02-23 | End: 2021-02-23 | Stop reason: HOSPADM

## 2021-02-23 RX ORDER — VECURONIUM BROMIDE 1 MG/ML
INJECTION, POWDER, LYOPHILIZED, FOR SOLUTION INTRAVENOUS PRN
Status: DISCONTINUED | OUTPATIENT
Start: 2021-02-23 | End: 2021-02-23

## 2021-02-23 RX ORDER — NEOSTIGMINE METHYLSULFATE 1 MG/ML
VIAL (ML) INJECTION PRN
Status: DISCONTINUED | OUTPATIENT
Start: 2021-02-23 | End: 2021-02-23

## 2021-02-23 RX ORDER — ONDANSETRON 2 MG/ML
INJECTION INTRAMUSCULAR; INTRAVENOUS PRN
Status: DISCONTINUED | OUTPATIENT
Start: 2021-02-23 | End: 2021-02-23

## 2021-02-23 RX ORDER — FENTANYL CITRATE 50 UG/ML
25-50 INJECTION, SOLUTION INTRAMUSCULAR; INTRAVENOUS
Status: DISCONTINUED | OUTPATIENT
Start: 2021-02-23 | End: 2021-02-23

## 2021-02-23 RX ADMIN — FENTANYL CITRATE 50 MCG: 50 INJECTION, SOLUTION INTRAMUSCULAR; INTRAVENOUS at 16:17

## 2021-02-23 RX ADMIN — SODIUM CHLORIDE, POTASSIUM CHLORIDE, SODIUM LACTATE AND CALCIUM CHLORIDE: 600; 310; 30; 20 INJECTION, SOLUTION INTRAVENOUS at 16:40

## 2021-02-23 RX ADMIN — Medication 5 MG: at 19:54

## 2021-02-23 RX ADMIN — STANDARDIZED SENNA CONCENTRATE 2 TABLET: 8.6 TABLET ORAL at 08:43

## 2021-02-23 RX ADMIN — DEXAMETHASONE 4 MG: 4 TABLET ORAL at 08:43

## 2021-02-23 RX ADMIN — FAMOTIDINE 20 MG: 20 TABLET ORAL at 08:43

## 2021-02-23 RX ADMIN — LIDOCAINE HYDROCHLORIDE 80 MG: 20 INJECTION, SOLUTION INFILTRATION; PERINEURAL at 16:17

## 2021-02-23 RX ADMIN — VECURONIUM BROMIDE 2 MG: 1 INJECTION, POWDER, LYOPHILIZED, FOR SOLUTION INTRAVENOUS at 17:00

## 2021-02-23 RX ADMIN — BUSPIRONE HYDROCHLORIDE 30 MG: 15 TABLET ORAL at 08:43

## 2021-02-23 RX ADMIN — CEFAZOLIN SODIUM 1 G: 2 INJECTION, SOLUTION INTRAVENOUS at 18:45

## 2021-02-23 RX ADMIN — HYDROXYZINE HYDROCHLORIDE 25 MG: 25 TABLET, FILM COATED ORAL at 15:21

## 2021-02-23 RX ADMIN — PROPOFOL 150 MG: 10 INJECTION, EMULSION INTRAVENOUS at 16:17

## 2021-02-23 RX ADMIN — VECURONIUM BROMIDE 2 MG: 1 INJECTION, POWDER, LYOPHILIZED, FOR SOLUTION INTRAVENOUS at 19:30

## 2021-02-23 RX ADMIN — SODIUM CHLORIDE, POTASSIUM CHLORIDE, SODIUM LACTATE AND CALCIUM CHLORIDE: 600; 310; 30; 20 INJECTION, SOLUTION INTRAVENOUS at 09:45

## 2021-02-23 RX ADMIN — GLYCOPYRROLATE 0.5 MG: 0.2 INJECTION, SOLUTION INTRAMUSCULAR; INTRAVENOUS at 20:19

## 2021-02-23 RX ADMIN — SODIUM CHLORIDE, POTASSIUM CHLORIDE, SODIUM LACTATE AND CALCIUM CHLORIDE: 600; 310; 30; 20 INJECTION, SOLUTION INTRAVENOUS at 14:04

## 2021-02-23 RX ADMIN — PROPOFOL 25 MCG/KG/MIN: 10 INJECTION, EMULSION INTRAVENOUS at 17:10

## 2021-02-23 RX ADMIN — FENTANYL CITRATE 100 MCG: 50 INJECTION, SOLUTION INTRAMUSCULAR; INTRAVENOUS at 16:39

## 2021-02-23 RX ADMIN — NEOSTIGMINE METHYLSULFATE 3.5 MG: 1 INJECTION, SOLUTION INTRAVENOUS at 20:19

## 2021-02-23 RX ADMIN — DEXAMETHASONE SODIUM PHOSPHATE 4 MG: 4 INJECTION, SOLUTION INTRA-ARTICULAR; INTRALESIONAL; INTRAMUSCULAR; INTRAVENOUS; SOFT TISSUE at 16:22

## 2021-02-23 RX ADMIN — Medication 5 MG: at 19:09

## 2021-02-23 RX ADMIN — FENTANYL CITRATE 50 MCG: 50 INJECTION, SOLUTION INTRAMUSCULAR; INTRAVENOUS at 19:41

## 2021-02-23 RX ADMIN — HYDRALAZINE HYDROCHLORIDE 10 MG: 20 INJECTION INTRAMUSCULAR; INTRAVENOUS at 04:29

## 2021-02-23 RX ADMIN — LEVETIRACETAM 500 MG: 5 INJECTION, SOLUTION INTRAVENOUS at 23:30

## 2021-02-23 RX ADMIN — AMLODIPINE BESYLATE 5 MG: 5 TABLET ORAL at 08:43

## 2021-02-23 RX ADMIN — NICARDIPINE HYDROCHLORIDE 2.5 MG/HR: 0.2 INJECTION INTRAVENOUS at 20:40

## 2021-02-23 RX ADMIN — LORAZEPAM 0.5 MG: 0.5 TABLET ORAL at 05:38

## 2021-02-23 RX ADMIN — FENTANYL CITRATE 50 MCG: 50 INJECTION, SOLUTION INTRAMUSCULAR; INTRAVENOUS at 16:36

## 2021-02-23 RX ADMIN — VECURONIUM BROMIDE 2 MG: 1 INJECTION, POWDER, LYOPHILIZED, FOR SOLUTION INTRAVENOUS at 18:24

## 2021-02-23 RX ADMIN — Medication 5 MG: at 17:04

## 2021-02-23 RX ADMIN — ROCURONIUM BROMIDE 50 MG: 10 INJECTION INTRAVENOUS at 16:17

## 2021-02-23 RX ADMIN — FENTANYL CITRATE 50 MCG: 50 INJECTION, SOLUTION INTRAMUSCULAR; INTRAVENOUS at 17:48

## 2021-02-23 RX ADMIN — CEFAZOLIN SODIUM 2 G: 2 INJECTION, SOLUTION INTRAVENOUS at 16:45

## 2021-02-23 RX ADMIN — FLUOXETINE 80 MG: 20 CAPSULE ORAL at 08:43

## 2021-02-23 RX ADMIN — APREPITANT 40 MG: 40 CAPSULE ORAL at 15:21

## 2021-02-23 RX ADMIN — VECURONIUM BROMIDE 3 MG: 1 INJECTION, POWDER, LYOPHILIZED, FOR SOLUTION INTRAVENOUS at 17:36

## 2021-02-23 RX ADMIN — MANNITOL 20 G: 20 INJECTION, SOLUTION INTRAVENOUS at 18:06

## 2021-02-23 RX ADMIN — PROPOFOL 50 MG: 10 INJECTION, EMULSION INTRAVENOUS at 16:36

## 2021-02-23 RX ADMIN — ONDANSETRON 4 MG: 2 INJECTION INTRAMUSCULAR; INTRAVENOUS at 19:58

## 2021-02-23 RX ADMIN — Medication 5 MG: at 17:02

## 2021-02-23 RX ADMIN — ACETAMINOPHEN 500 MG: 500 TABLET, FILM COATED ORAL at 05:38

## 2021-02-23 RX ADMIN — DEXAMETHASONE SODIUM PHOSPHATE 4 MG: 4 INJECTION, SOLUTION INTRA-ARTICULAR; INTRALESIONAL; INTRAMUSCULAR; INTRAVENOUS; SOFT TISSUE at 23:30

## 2021-02-23 RX ADMIN — VECURONIUM BROMIDE 3 MG: 1 INJECTION, POWDER, LYOPHILIZED, FOR SOLUTION INTRAVENOUS at 18:54

## 2021-02-23 ASSESSMENT — ENCOUNTER SYMPTOMS
SEIZURES: 0
DYSRHYTHMIAS: 0

## 2021-02-23 ASSESSMENT — COPD QUESTIONNAIRES: COPD: 0

## 2021-02-23 ASSESSMENT — ACTIVITIES OF DAILY LIVING (ADL)
ADLS_ACUITY_SCORE: 15

## 2021-02-23 NOTE — PLAN OF CARE
Here with left brain mass.  Neuros stable.  BUE baseline tremors.  Left pupil is slightly bigger than right since admission.  C/o slight headache, declines intervention.  Up independently in room.  Shower this am.  Report to pre-op given.

## 2021-02-23 NOTE — PROGRESS NOTES
SPIRITUAL HEALTH SERVICES Progress Note  [facility]    Referral Source: Follow-up for pre-surgical visit and prayer.    Visited PT and daughter per request by PT for pre-surgical visit. PT indicated that she was feeling positive about the outcome but also aware of the risks. She stated that she spoke with two of her grandchildren yesterday, telling each separately how much they meant to her and how they have each been a large and meaningful part of her life. PT disclosed that she was in contact with both of her sons by telephone.    Provided emotional and spiritual support through active listening, affirmation of concerns and values, and prayer.    PT will likely transition to ICU following surgery and indicates that she welcomes visit there. Follow PT while she remains in hospital.      Doug Silver  Chaplain Resident

## 2021-02-23 NOTE — ANESTHESIA PROCEDURE NOTES
Airway   Date/Time: 2/23/2021 4:21 PM   Patient location during procedure: OR  Staff -   Anesthesiologist:  Coni Wagner MD  CRNA: Shelli Bauer APRN CRNA  Performed By: CRNA    Indications and Patient Condition  Indications for airway management: brad-procedural  Induction type:intravenousMask difficulty assessment: 2 - vent by mask + OA or adjuvant +/- NMBA    Final Airway Details  Final airway type: endotracheal airway  Successful airway:ETT - single  Endotracheal Airway Details   ETT size (mm): 7.0  Cuffed: yes  Successful intubation technique: video laryngoscopy  Grade View of Cords: 1  Adjucts: stylet  Measured from: lips  Secured at (cm): 21  Secured with: pink tape  Bite block used: None    Post intubation assessment   Placement verified by: capnometry, equal breath sounds and chest rise   Number of attempts at approach: 1  Number of other approaches attempted: 0  Secured with:pink tape  Ease of procedure: easy  Dentition: Intact and Unchanged

## 2021-02-23 NOTE — PROGRESS NOTES
[facility]    Medicine Progress Note - Hospitalist Service       Date of Admission:  2/17/2021  Assessment & Plan       75 year old female with history including asthma, depression/anxiety and right occipital meningioma, who presented 2/17/2021 with recent headache, speech and reading problems and was found by MRI with a new enhancing left frontal-parietal mass concerning for primary brain neoplasm.     Enhancing left frontal-parietal mass with surrounding edema, concerning for primary brain neoplasm  * Pt with issues with headache, speech/language impairment. MRI ordered in clinic 2/17 an enhancing 3.3 x 2.7 x 2.9 cm intra-axial mass in the lateral left frontoparietal region with signs of moderate amount of edema w/o midline shift, overall concerning for high grade primary brain neoplasm; unchanged small right occipital meningioma. Subsequently sent to [facility] and admitted.   * Started on IV dexamethasone. Neurosurgery consulted. CT CAP 2/18 did not show signs of a primary malignancy. MRI brain with contrast 2/19 showed a 3.3 x 2.8 x 2.8 cm enhancing mass in left frontal parietal region with dural base, could be intra-axial or extra-axial; 0.5 x 1.0 x 1.0 cm enhancing mass in right occipital lobe which was dural based, appeared to be extra-axial.  - Plan left parietal craniotomy for resection of tumor 2/23/21; plan to go to ICU post-op.  - Continue IV dexamethasone 4 mg TID.  - Continue famotidine for GI prophylaxis.  - Continue PRN acetaminophen.  - PRN IV Dilaudid 0.3-0.5 mg q4h for severe pain.   - IVF started 2/23 AM given NPO; OR afternoon of 2/23     Hyperglycemia, secondary to steroids  Hgb A1C 5.4 2/18/2021. Sugars largely 90s-130.  - Continue ISS.     Hypertension, possibly due to steroids or underlying benign essential HTN  BP's frequently elevated this hospitalization. Started on amlodipine 2/21.  - Continue amlodipine 5 mg daily (was increased 2/22) - full effect likely a  couple days so will watch and utilize PRN as below  - Continue PRN IV hydralazine and PRN IV labetalol.     Leukocytosis, suspect due to steroids  No clear signs of infection. WBC 12.8 on 2/20/21, normal on adm.  - Monitor clinically for signs of infection.  - Monitor CBC.     Depression/anxiety  Bupropion held because of potential to lower seizure threshold. Started on PRN lorazepam this hospitalization.  - Continue buspirone, fluoxetine.   - Continue PRN PO lorazepam 0.5 mg TID.     Asthma  Not on bronchodilators PTA.  - Monitor clinically. CTABL 2/23.         COVID-19 testing  2/17/21 PCR negative, 8/8/20 PCR negative        Diet: NPO per Anesthesia Guidelines for Procedure/Surgery Except for: Meds    DVT Prophylaxis: Pneumatic Compression Devices  Martino Catheter: not present  Code Status: Full Code           Disposition Plan   Expected discharge: pending post op course, OR today. 2+ days anticipated.  Entered: Duke Bernabe MD 02/23/2021, 7:59 AM       The patient's care was discussed with the Bedside Nurse, Patient and Patient's Family.    Greater than 35 min spent with >50% spent in direct patient care, counseling, and coordination of care.     Duke Bernabe MD  Hospitalist Service  [facility]  Contact information available via [facility] Paging/Directory    ______________________________________________________________________    Interval History   Care assumed today, chart reviewed. Patient seen and examined with her daughter bedside. Reviewed history PTA and current state with OR upcoming. No new neuro deficits - per daughter, some word finding at times, baseline tremor noted, RUE drift reported. Denies headache or vision changes. No sob or chest pain. Occasional cough noted but no production, no fevers or chills. OR planned for this afternoon with NS.     ROS otherwise negative besides the above.      Data reviewed today: I reviewed all medications, new labs and imaging results  over the last 24 hours. I personally reviewed no images or EKG's today.    Physical Exam   Vital Signs: Temp: 97.6  F (36.4  C) Temp src: Oral BP: (!) 140/79 Pulse: 73   Resp: 16 SpO2: 95 % O2 Device: None (Room air)    Weight: 152 lbs 0 oz    Gen: NAD, pleasant  HEENT: Normocephalic, EOMI, MMM  Resp: no crackles,  no wheezes, no increased work of resp  CV: S1S2 heard, reg rhythm, reg rate, no pitting pedal edema  Abdo: soft, nontender, nondistended, bowel sounds present  Ext: calves nontender, well perfused  Neuro: AAOx3, no facial asym, mild RUE weakness, no obvious word finding difficulty, sensory grossly intact, BLE sensorimotor intact and sym.       Data   Recent Labs   Lab 02/22/21  0859 02/20/21  0929 02/18/21  0735 02/17/21  1305   WBC 10.9 12.8* 7.0 6.9   HGB 11.7 12.2 12.1 11.7   MCV 89 91 90 90    357 343 353   INR 1.02  --  1.03 0.98    136 140 140   POTASSIUM 3.9 4.3 4.2 4.0   CHLORIDE 105 106 106 107   CO2 27 26 28 27   BUN 28 26 19 17   CR 0.77 0.77 0.74 0.72   ANIONGAP 4 4 6 6   ESTIVEN 9.1 9.7 9.4 9.3   * 127* 132* 113*   ALBUMIN  --   --  3.7 3.7   PROTTOTAL  --   --  6.9 6.7*   BILITOTAL  --   --  0.3 0.3   ALKPHOS  --   --  87 86   ALT  --   --  35 35   AST  --   --  24 28     No results found for this or any previous visit (from the past 24 hour(s)).
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
## 2021-02-23 NOTE — PLAN OF CARE
Patient A&0x4. Up ad kat. Neuro's remain unchanged - Slight R drift noted. Lungs clear. No edema.Denies HA, Denies Nausea, Denies vision changes.  On RA. Tele NSR. No c/o pain this shift. Plan for crani tomorrow. NPO after midnight.

## 2021-02-23 NOTE — PLAN OF CARE
Pt here with new brain mass. A&Ox4. Neuros intact except for L pupil 3mm R 2mm. VSS, BP <150. Regular diet, thin liquids-NPO at midnight for surgery. Takes pills whole with water. Up independently. Denies pain.  Pt scoring green on the Aggression Stop Light Tool. Plan for crani today at 2:50 pm. Belongings at bedside with patient.

## 2021-02-24 ENCOUNTER — APPOINTMENT (OUTPATIENT)
Dept: PHYSICAL THERAPY | Facility: CLINIC | Age: 76
DRG: 025 | End: 2021-02-24
Payer: MEDICARE

## 2021-02-24 ENCOUNTER — APPOINTMENT (OUTPATIENT)
Dept: SPEECH THERAPY | Facility: CLINIC | Age: 76
DRG: 025 | End: 2021-02-24
Attending: HOSPITALIST
Payer: MEDICARE

## 2021-02-24 ENCOUNTER — APPOINTMENT (OUTPATIENT)
Dept: CT IMAGING | Facility: CLINIC | Age: 76
DRG: 025 | End: 2021-02-24
Attending: PHYSICIAN ASSISTANT
Payer: MEDICARE

## 2021-02-24 LAB
ANION GAP SERPL CALCULATED.3IONS-SCNC: 6 MMOL/L (ref 3–14)
BASOPHILS # BLD AUTO: 0 10E9/L (ref 0–0.2)
BASOPHILS NFR BLD AUTO: 0.2 %
BUN SERPL-MCNC: 28 MG/DL (ref 7–30)
CALCIUM SERPL-MCNC: 8.5 MG/DL (ref 8.5–10.1)
CHLORIDE SERPL-SCNC: 102 MMOL/L (ref 94–109)
CO2 SERPL-SCNC: 27 MMOL/L (ref 20–32)
CREAT SERPL-MCNC: 0.64 MG/DL (ref 0.52–1.04)
DIFFERENTIAL METHOD BLD: ABNORMAL
EOSINOPHIL # BLD AUTO: 0 10E9/L (ref 0–0.7)
EOSINOPHIL NFR BLD AUTO: 0 %
ERYTHROCYTE [DISTWIDTH] IN BLOOD BY AUTOMATED COUNT: 13.6 % (ref 10–15)
GFR SERPL CREATININE-BSD FRML MDRD: 87 ML/MIN/{1.73_M2}
GLUCOSE BLDC GLUCOMTR-MCNC: 101 MG/DL (ref 70–99)
GLUCOSE BLDC GLUCOMTR-MCNC: 109 MG/DL (ref 70–99)
GLUCOSE BLDC GLUCOMTR-MCNC: 110 MG/DL (ref 70–99)
GLUCOSE BLDC GLUCOMTR-MCNC: 118 MG/DL (ref 70–99)
GLUCOSE BLDC GLUCOMTR-MCNC: 152 MG/DL (ref 70–99)
GLUCOSE BLDC GLUCOMTR-MCNC: 154 MG/DL (ref 70–99)
GLUCOSE SERPL-MCNC: 110 MG/DL (ref 70–99)
HCT VFR BLD AUTO: 32.4 % (ref 35–47)
HGB BLD-MCNC: 10.3 G/DL (ref 11.7–15.7)
IMM GRANULOCYTES # BLD: 0.2 10E9/L (ref 0–0.4)
IMM GRANULOCYTES NFR BLD: 0.8 %
LYMPHOCYTES # BLD AUTO: 0.5 10E9/L (ref 0.8–5.3)
LYMPHOCYTES NFR BLD AUTO: 2.6 %
MCH RBC QN AUTO: 28.6 PG (ref 26.5–33)
MCHC RBC AUTO-ENTMCNC: 31.8 G/DL (ref 31.5–36.5)
MCV RBC AUTO: 90 FL (ref 78–100)
MONOCYTES # BLD AUTO: 1.9 10E9/L (ref 0–1.3)
MONOCYTES NFR BLD AUTO: 9.5 %
NEUTROPHILS # BLD AUTO: 17.3 10E9/L (ref 1.6–8.3)
NEUTROPHILS NFR BLD AUTO: 86.9 %
NRBC # BLD AUTO: 0 10*3/UL
NRBC BLD AUTO-RTO: 0 /100
PLATELET # BLD AUTO: 312 10E9/L (ref 150–450)
POTASSIUM SERPL-SCNC: 4.3 MMOL/L (ref 3.4–5.3)
RBC # BLD AUTO: 3.6 10E12/L (ref 3.8–5.2)
SODIUM SERPL-SCNC: 135 MMOL/L (ref 133–144)
WBC # BLD AUTO: 19.9 10E9/L (ref 4–11)

## 2021-02-24 PROCEDURE — 250N000011 HC RX IP 250 OP 636: Performed by: PHYSICIAN ASSISTANT

## 2021-02-24 PROCEDURE — 250N000013 HC RX MED GY IP 250 OP 250 PS 637: Performed by: PHYSICIAN ASSISTANT

## 2021-02-24 PROCEDURE — 80048 BASIC METABOLIC PNL TOTAL CA: CPT | Performed by: INTERNAL MEDICINE

## 2021-02-24 PROCEDURE — 999N001017 HC STATISTIC GLUCOSE BY METER IP

## 2021-02-24 PROCEDURE — 92526 ORAL FUNCTION THERAPY: CPT | Mod: GN | Performed by: SPEECH-LANGUAGE PATHOLOGIST

## 2021-02-24 PROCEDURE — 97116 GAIT TRAINING THERAPY: CPT | Mod: GP | Performed by: PHYSICAL THERAPIST

## 2021-02-24 PROCEDURE — 70450 CT HEAD/BRAIN W/O DYE: CPT | Mod: MG

## 2021-02-24 PROCEDURE — 250N000013 HC RX MED GY IP 250 OP 250 PS 637: Performed by: HOSPITALIST

## 2021-02-24 PROCEDURE — 97530 THERAPEUTIC ACTIVITIES: CPT | Mod: GP | Performed by: PHYSICAL THERAPIST

## 2021-02-24 PROCEDURE — 250N000011 HC RX IP 250 OP 636: Performed by: HOSPITALIST

## 2021-02-24 PROCEDURE — 120N000001 HC R&B MED SURG/OB

## 2021-02-24 PROCEDURE — 99232 SBSQ HOSP IP/OBS MODERATE 35: CPT | Performed by: HOSPITALIST

## 2021-02-24 PROCEDURE — 85025 COMPLETE CBC W/AUTO DIFF WBC: CPT | Performed by: INTERNAL MEDICINE

## 2021-02-24 PROCEDURE — 92610 EVALUATE SWALLOWING FUNCTION: CPT | Mod: GN | Performed by: SPEECH-LANGUAGE PATHOLOGIST

## 2021-02-24 PROCEDURE — 97162 PT EVAL MOD COMPLEX 30 MIN: CPT | Mod: GP | Performed by: PHYSICAL THERAPIST

## 2021-02-24 PROCEDURE — 36415 COLL VENOUS BLD VENIPUNCTURE: CPT | Performed by: INTERNAL MEDICINE

## 2021-02-24 PROCEDURE — 258N000003 HC RX IP 258 OP 636: Performed by: PHYSICIAN ASSISTANT

## 2021-02-24 RX ADMIN — ACETAMINOPHEN 500 MG: 500 TABLET, FILM COATED ORAL at 07:56

## 2021-02-24 RX ADMIN — INSULIN ASPART 1 UNITS: 100 INJECTION, SOLUTION INTRAVENOUS; SUBCUTANEOUS at 16:20

## 2021-02-24 RX ADMIN — LEVETIRACETAM 500 MG: 5 INJECTION, SOLUTION INTRAVENOUS at 11:21

## 2021-02-24 RX ADMIN — LEVETIRACETAM 500 MG: 5 INJECTION, SOLUTION INTRAVENOUS at 22:13

## 2021-02-24 RX ADMIN — CEFAZOLIN 1 G: 1 INJECTION, POWDER, FOR SOLUTION INTRAMUSCULAR; INTRAVENOUS at 18:55

## 2021-02-24 RX ADMIN — DEXAMETHASONE SODIUM PHOSPHATE 4 MG: 4 INJECTION, SOLUTION INTRA-ARTICULAR; INTRALESIONAL; INTRAMUSCULAR; INTRAVENOUS; SOFT TISSUE at 05:56

## 2021-02-24 RX ADMIN — SODIUM CHLORIDE, POTASSIUM CHLORIDE, SODIUM LACTATE AND CALCIUM CHLORIDE: 600; 310; 30; 20 INJECTION, SOLUTION INTRAVENOUS at 10:41

## 2021-02-24 RX ADMIN — DEXAMETHASONE SODIUM PHOSPHATE 4 MG: 4 INJECTION, SOLUTION INTRA-ARTICULAR; INTRALESIONAL; INTRAMUSCULAR; INTRAVENOUS; SOFT TISSUE at 11:18

## 2021-02-24 RX ADMIN — CEFAZOLIN 1 G: 1 INJECTION, POWDER, FOR SOLUTION INTRAMUSCULAR; INTRAVENOUS at 02:05

## 2021-02-24 RX ADMIN — BUSPIRONE HYDROCHLORIDE 30 MG: 15 TABLET ORAL at 08:04

## 2021-02-24 RX ADMIN — DEXAMETHASONE SODIUM PHOSPHATE 4 MG: 4 INJECTION, SOLUTION INTRA-ARTICULAR; INTRALESIONAL; INTRAMUSCULAR; INTRAVENOUS; SOFT TISSUE at 16:22

## 2021-02-24 RX ADMIN — AMLODIPINE BESYLATE 5 MG: 5 TABLET ORAL at 07:58

## 2021-02-24 RX ADMIN — STANDARDIZED SENNA CONCENTRATE 1 TABLET: 8.6 TABLET ORAL at 19:59

## 2021-02-24 RX ADMIN — FAMOTIDINE 20 MG: 20 TABLET ORAL at 19:58

## 2021-02-24 RX ADMIN — LORAZEPAM 0.5 MG: 0.5 TABLET ORAL at 22:14

## 2021-02-24 RX ADMIN — DEXAMETHASONE SODIUM PHOSPHATE 4 MG: 4 INJECTION, SOLUTION INTRA-ARTICULAR; INTRALESIONAL; INTRAMUSCULAR; INTRAVENOUS; SOFT TISSUE at 22:13

## 2021-02-24 RX ADMIN — BUSPIRONE HYDROCHLORIDE 30 MG: 15 TABLET ORAL at 19:58

## 2021-02-24 RX ADMIN — ACETAMINOPHEN 500 MG: 500 TABLET, FILM COATED ORAL at 22:14

## 2021-02-24 RX ADMIN — FAMOTIDINE 20 MG: 20 TABLET ORAL at 07:57

## 2021-02-24 RX ADMIN — HYDROMORPHONE HYDROCHLORIDE 0.5 MG: 1 INJECTION, SOLUTION INTRAMUSCULAR; INTRAVENOUS; SUBCUTANEOUS at 07:40

## 2021-02-24 RX ADMIN — FLUOXETINE 80 MG: 20 CAPSULE ORAL at 08:05

## 2021-02-24 RX ADMIN — STANDARDIZED SENNA CONCENTRATE 2 TABLET: 8.6 TABLET ORAL at 08:00

## 2021-02-24 RX ADMIN — HYDRALAZINE HYDROCHLORIDE 10 MG: 20 INJECTION INTRAMUSCULAR; INTRAVENOUS at 07:50

## 2021-02-24 RX ADMIN — HYDROMORPHONE HYDROCHLORIDE 0.3 MG: 1 INJECTION, SOLUTION INTRAMUSCULAR; INTRAVENOUS; SUBCUTANEOUS at 02:23

## 2021-02-24 RX ADMIN — CEFAZOLIN 1 G: 1 INJECTION, POWDER, FOR SOLUTION INTRAMUSCULAR; INTRAVENOUS at 09:33

## 2021-02-24 ASSESSMENT — MIFFLIN-ST. JEOR: SCORE: 1195.51

## 2021-02-24 ASSESSMENT — ACTIVITIES OF DAILY LIVING (ADL)
ADLS_ACUITY_SCORE: 16

## 2021-02-24 NOTE — PLAN OF CARE
"0800 Pt oriented to person, \"[place], February.  At 1400, pt able to say Feb, 24, 2021 and was in hospital for brain tumor. Continues to have intermittent word finding issues. Frustrated at times.  R arm slight drift, slightly weaker than L which increased with feeling tired and decreased at .   Coughed after few sips water using straw.  Dysphagia study performed with no swallowing issues.  Will hold straws for now.  Headache relief after Dilaudid and Tylenol.  Lungs: clear.  O2 sat 91% on roomair.  Uses IS to 1500cc.    CV: Hydralazine X 1 for SBP >150.  GI: Poor appetite.  Took supplements well.  U/O adequate  Dr Cummings and Dr Bernabe here and updated pt and dtr.    Per Dr Cummings, neurochecks q4hr and may transfer to 73.  Dtr here and updated.  "

## 2021-02-24 NOTE — PROGRESS NOTES
"CLINICAL NUTRITION SERVICES  -  ASSESSMENT NOTE      Recommendations Ordered by Registered Dietitian (RD):   Chocolate Boost Plus at 10 am and 2 pm   Malnutrition:   % Weight Loss:  Up to 7.5% in 3 months (non-severe malnutrition)  % Intake:  <75% for > 7 days (non-severe malnutrition) - Suspect  Subcutaneous Fat Loss:  Unable to determine  Muscle Loss:  Unable to determine  Fluid Retention:  None noted    Malnutrition Diagnosis: Non-Severe malnutrition  In Context of:  Acute illness or injury        REASON FOR ASSESSMENT  Olga Bailey is a 75 year old female seen by Registered Dietitian for LOS      NUTRITION HISTORY  - Unable to obtain nutrition history as pt not available this am and is in process of transferring out of ICU to the floor now.  No food allergies/inolerances noted.      CURRENT NUTRITION ORDERS  Diet Order:     Dysphagia DD2 with thin liquids, no straws + Boost Plus BID btw meals.    Current Intake/Tolerance:  Pt was ordering 2-3 meals per day prior to surgery.  She is eating % per nursing flow sheets.  She took 780 mL oral thus far today.  RN requested a Boost type supplement, and at first we sent Boost Breeze but later changed it to Boost Plus in chocolate flavor per pt request.    2/23:  Surgery = left parietal craniotomy for tumor resection   2/24:  SLP bedside swallow = Mild-moderate oral-pharyngeal dysphagia --> DD2 with thin liquids (no straw)      NUTRITION FOCUSED PHYSICAL ASSESSMENT FOR DIAGNOSING MALNUTRITION)  No:  Patient not available                Observed:    N/A    Obtained from Chart/Interdisciplinary Team:  None noted    ANTHROPOMETRICS  Height: 5' 2.52\"  Admit Weight: 68.4 kg (2/20)  Current Wt:  73.9 kg  Body mass index is 27.2 kg/m .  Weight Status:  Overweight BMI 25-29.9  IBW:  51.1 kg  % IBW:  134%  Weight History:  Admit weight down 4.2 kg (6%) from 3 months ago    Wt Readings from Last 10 Encounters:   02/24/21 73.9 kg (162 lb 14.7 oz)   11/16/20 72.6 kg "   11/20/15 68 kg (150 lb)   01/22/08 69.9 kg (154 lb)   08/15/07 71.2 kg (157 lb)   01/29/07 68 kg (150 lb)   01/09/07 67.6 kg (149 lb)   10/31/06 70.8 kg (156 lb)   01/05/06 69.8 kg (153 lb 12.8 oz)   01/03/06 69.8 kg (153 lb 12.8 oz)   12/21/04 65.3 kg (144 lb)     LABS  Labs reviewed    MEDICATIONS  Medications reviewed  LR at 100 mL/hr - for fluid delivery  Senokot - for bowel program    ASSESSED NUTRITION NEEDS PER APPROVED PRACTICE GUIDELINES:    Dosing Weight:  55.4 kg (adjusted for overwt)  Estimated Energy Needs:  6255-7994 kcals (25-30 Kcal/Kg)  Justification: overweight  Estimated Protein Needs:  66-83 grams protein (1.2-1.5 g pro/Kg)  Justification: post-op and hypercatabolism with acute illness  Estimated Fluid Needs:  9496-1305 mL (1 mL/Kcal)  Justification: maintenance    MALNUTRITION:  % Weight Loss:  Up to 7.5% in 3 months (non-severe malnutrition)  % Intake:  <75% for > 7 days (non-severe malnutrition) - Suspect  Subcutaneous Fat Loss:  Unable to determine  Muscle Loss:  Unable to determine  Fluid Retention:  None noted    Malnutrition Diagnosis: Non-Severe malnutrition  In Context of:  Acute illness or injury    NUTRITION DIAGNOSIS:  Inadequate oral intake related to decreased appetite as evidenced by RN request for oral liquid nutritional supplements to be sent btw meals.      NUTRITION INTERVENTIONS  Recommendations / Nutrition Prescription  Chocolate Boost Plus at 10 am and 2 pm    Implementation  Nutrition education: Per Provider order if indicated   Collaboration and Referral of Nutrition care - Pt was discussed during ICU interdisciplinary rounds this morning    Nutrition Goals  Pt will consume > 75% meals and > 50% supplements in 3-5 days.    MONITORING AND EVALUATION:  Progress towards goals will be monitored and evaluated per protocol and Practice Guidelines    Olya Berman RD, LD, CNSC

## 2021-02-24 NOTE — PROGRESS NOTES
02/24/21 1534   General Information   Onset of Illness/Injury or Date of Surgery 02/23/21   Referring Physician Dr. Bernabe   Patient/Family Therapy Goal Statement (SLP) Agreed to POC goal.  No additional goal was stated.   Pertinent History of Current Problem Per notes: 75 year old female with history including asthma, depression/anxiety and right occipital meningioma, who presented 2/17/2021 with recent headache, speech and reading problems and was found by MRI with a new enhancing left frontal-parietal mass concerning for primary brain neoplasm.  2/23: left parietal craniotomy for tumor resection.      RN and daughter report pt has been coughing with thin liquids through straw at times today.     General Observations Pt was alert and cooperative.  Word finding difficulties noted at times.  Assist was needed to use right hand to feed self.  Daughter reports pt has a hx of pneumonia, but no dysphagia.   Oral Motor   Oral Musculature   (Oral apraxia; Slight decreased L labial elevation on retractraction)   Cough/Swallow/Gag Reflex (Oral Motor)   Comment, Cough/Swallow/Gag Reflex (Oral Motor) Oral apraxia limited ability   Vocal Quality/Secretion Management (Oral Motor)   Comment, Vocal Quality/Secretion Management (Oral Motor) Mild hoarse quality   General Swallowing Observations   Current Diet/Method of Nutritional Intake (General Swallowing Observations, NIS)   (Clear/ADAT)   Clinical Swallow Evaluation   Feeding Assistance frequent cues/help required   Clinical Swallow Eval: Thin Liquid Texture Trial   Mode of Presentation, Thin Liquids cup   Volume of Liquid or Food Presented sips x 8   Oral Phase of Swallow Premature pharyngeal entry   Pharyngeal Phase of Swallow reduction in laryngeal movement  (delay at times)   Diagnostic Statement impulsive large sips at times, no overt signs of aspiration    Clinical Swallow Evaluation: Semisolid Texture Trial   Mode of Presentation, Semisolid spoon   Volume of Semisolid  Food Presented tsps x 4   Oral Phase, Semisolid   (mild decreased coordination)   Pharyngeal Phase, Semisolid reduction in laryngeal movement  (delay at times)   Diagnostic Statement no overt signs of aspiration    Clinical Swallow Evaluation: Solid Food Texture Trial   Mode of Presentation, Solid self-fed   Volume of Solid Food Presented bite x 1   Oral Phase, Solid Residue in oral cavity  (poor coordination, oral residue, prolonged phase)   Pharyngeal Phase, Solid reduction in laryngeal movement  (delay)   Diagnostic Statement no overt signs of aspiration, cued pt to use alternating textures    Swallowing Recommendations   Diet Consistency Recommendations dysphagia level 2 (mechanically altered);thin liquids   Supervision Level for Intake 1:1 supervision needed   Mode of Delivery Recommendations no straws;bolus size, small;slow rate of intake   Swallowing Maneuver Recommendations alternate food and liquid intake   Monitoring/Assistance Required (Eating/Swallowing) monitor for cough or change in vocal quality with intake   Recommended Feeding/Eating Techniques (Swallow Eval) maintain upright sitting position for eating   Medication Administration Recommendations, Swallowing (SLP) 1 pill at a time, use puree as needed   Instrumental Assessment Recommendations   (to be determined)   SLP Therapy Assessment/Plan   Criteria for Skilled Therapeutic Interventions Met (SLP Eval) yes   SLP Diagnosis Mild-moderate oral-pharyngeal dysphagia   Rehab Potential (SLP Eval) good, to achieve stated therapy goals   Therapy Frequency (SLP Eval) daily   Predicted Duration of Therapy Intervention (SLP Eval) 1 week   Comment, Therapy Assessment/Plan (SLP) Mild-moderate oral-pharyngeal dysphagia was observed during today's evaluation.  RN requested SLP evaluation due to some pt difficulty with thin liquids by straw today.  Deficits found include oral apraxia, decreased oral awareness, decreased coordination of oral phase, mild decreased  elevation, and variable swallow delay.  No overt aspiration signs were noted after swallows.  Slight throat clear after trials was noted during conversation.   Unable to rule out silent aspiration at bedside; therefore, recommend careful monitoring of pt's lung status.  Recommend initiation of a DDL2 and thin liquids, no strraw, 1:1 assist/supervision, and cues to use safe swallow strategies.  Will continue swallow Tx with ongoing assessment of safety for current diet and further upgrades.     Therapy Plan Review/Discharge Plan (SLP)   Therapy Plan Review (SLP) evaluation/treatment results reviewed;care plan/treatment goals reviewed;risks/benefits reviewed;current/potential barriers reviewed;participants voiced agreement with care plan;participants included;patient;daughter   SLP Discharge Planning    SLP Discharge Recommendation (DC Rec) [facility] patient will benefit from intensive, interdisciplinary therapy.  Anticipate will be able to tolerate 3 hours of therapy per day   SLP Rationale for DC Rec SLP Tx at [facility] to maximize swallow function for a least restrictive diet and communication for daily needs; pt below baseline   SLP Brief overview of current status  Mild-moderate oral-pharyngeal dysphagia noted.  Rec: DDL2 and thin liquids, no straw, 1:1 assist/supervision, and cues to use safe swallow strategies.  Will evaluate speech-language function when indicated.    Total Evaluation Time   Total Evaluation Time (Minutes) 15

## 2021-02-24 NOTE — ANESTHESIA CARE TRANSFER NOTE
Patient: Olga Bailey    Procedure(s):  left parietal craniotomy for tumor resection    Diagnosis: Pneumocephalus [G93.89]  Diagnosis Additional Information: No value filed.    Anesthesia Type:   No value filed.     Note:    Oropharynx: oropharynx clear of all foreign objects and spontaneously breathing  Level of Consciousness: awake and drowsy  Oxygen Supplementation: face mask  Level of Supplemental Oxygen (L/min / FiO2): 10  Independent Airway: airway patency satisfactory and stable  Dentition: dentition unchanged  Vital Signs Stable: post-procedure vital signs reviewed and stable  Report to RN Given: handoff report given  Patient transferred to: PACU  Comments: Pt awake and able to verbalize needs. ALL monitors on and audible. Vital signs stable with exception to pt hypertensive. Nicardipine started in PACU. MDA present and treating/managing. Spontaneous respiration without difficulty. o2 sats 100% on 10Lo2 per face mask. Pt denies pain. Report given to PACU RN.  Handoff Report: Identifed the Patient, Identified the Reponsible Provider, Reviewed the pertinent medical history, Discussed the surgical course, Reviewed Intra-OP anesthesia mangement and issues during anesthesia, Set expectations for post-procedure period and Allowed opportunity for questions and acknowledgement of understanding      Vitals: (Last set prior to Anesthesia Care Transfer)  CRNA VITALS  2/23/2021 2000 - 2/23/2021 2100      2/23/2021             Resp Rate (observed):  10        Electronically Signed By: KAYCE Chávez CRNA  February 23, 2021  9:01 PM

## 2021-02-24 NOTE — PROGRESS NOTES
[facility]    Medicine Progress Note - Hospitalist Service       Date of Admission:  2/17/2021  Assessment & Plan       75 year old female with history including asthma, depression/anxiety and right occipital meningioma, who presented 2/17/2021 with recent headache, speech and reading problems and was found by MRI with a new enhancing left frontal-parietal mass concerning for primary brain neoplasm.     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 MRI ordered in clinic 2/17 an enhancing 3.3 x 2.7 x 2.9 cm intra-axial mass in the lateral left frontoparietal region with signs of moderate amount of edema w/o midline shift, overall concerning for high grade primary brain neoplasm; unchanged small right occipital meningioma. Subsequently sent to [facility] and admitted.   * Started on IV dexamethasone. Neurosurgery consulted. CT CAP 2/18 did not show signs of a primary malignancy. MRI brain with contrast 2/19 showed a 3.3 x 2.8 x 2.8 cm enhancing mass in left frontal parietal region with dural base, could be intra-axial or extra-axial; 0.5 x 1.0 x 1.0 cm enhancing mass in right occipital lobe which was dural based, appeared to be extra-axial.  - Decadron and keppra per NS, periop abx at their discretion as well  - Continue famotidine for GI prophylaxis.  - Continue PRN acetaminophen.  - await pathology, anticipate NS to consult Oncology at or before that point for ongoing planning and management  - some increase in exp aphasia - word finding 2/24, keppra, decadron continued, goal sbp <150 per NS.  - in ICU after surgery, ok for transfer back to floor per NS     Hyperglycemia, secondary to steroids  Hgb A1C 5.4 2/18/2021. Sugars largely 90s-130.  - Continue ISS.     Hypertension, possibly due to  steroids or underlying benign essential HTN  BP's frequently elevated this hospitalization. Started on amlodipine 2/21.  - Continue amlodipine 5 mg daily (was increased 2/22) - full effect likely a couple days so will watch and utilize PRN as below  - Continue PRN IV hydralazine and PRN IV labetalol.     Leukocytosis, suspect due to steroids and post op state  No clear signs of infection. WBC 12.8 on 2/20/21, normal on adm.  - Monitor clinically for signs of infection.  - WBC 19k on pod 1, afebrile. periop abx per surgery.  - Monitor CBC.     Depression/anxiety  Bupropion held because of potential to lower seizure threshold. Started on PRN lorazepam this hospitalization.  - Continue buspirone, fluoxetine.   - Continue PRN PO lorazepam 0.5 mg TID.     Asthma  Not on bronchodilators PTA.  - Monitor clinically. CTABL 2/23.      COVID-19 testing  2/17/21 PCR negative, 8/8/20 PCR negative      Diet: Advance Diet as Tolerated: Clear Liquid Diet    DVT Prophylaxis: Pneumatic Compression Devices  Martino Catheter: in place, indication: Strict 1-2 Hour I&O  Code Status: Full Code           Disposition Plan   Expected discharge: 2+ days pending post op course, neuro recs, therapy evals when appropriate/able  Entered: Duke Bernabe MD 02/24/2021, 7:24 AM       The patient's care was discussed with the Bedside Nurse, Patient and Patient's Family.    Duke Bernabe MD  Hospitalist Service  [facility]  Contact information available via [facility] Paging/Directory    ______________________________________________________________________    Interval History   POD 1, seen in ICU with daughter at bedside. Some increased word finding difficulty noted, grossly no new neuro deficit noted. Discussed with daughter who is RN at [facility] - much of prognosis and mgmt awaited based on pathology. No sob or chest pain today. Ok for floor transfer per NS.     Data reviewed today: I reviewed all medications, new labs  and imaging results over the last 24 hours. I personally reviewed no images or EKG's today.    Physical Exam   Vital Signs: Temp: 99.2  F (37.3  C) Temp src: Oral BP: 138/76 Pulse: 62   Resp: 11 SpO2: 94 % O2 Device: Nasal cannula Oxygen Delivery: 2 LPM  Weight: 162 lbs 14.72 oz      Gen: NAD, pleasant  HEENT: Normocephalic, EOMI, MMM  Resp: no crackles,  no wheezes, no increased work of resp  CV: S1S2 heard, reg rhythm, reg rate, no pitting pedal edema  Abdo: soft, nontender, nondistended, bowel sounds present  Ext: calves nontender, well perfused  Neuro: AAOx3, some mild word finding noted, moving all ext, CN grossly intact, no facial asymmetry      Data   Recent Labs   Lab 02/24/21  0703 02/22/21  0859 02/20/21  0929 02/18/21  0735 02/17/21  1305   WBC 19.9* 10.9 12.8* 7.0 6.9   HGB 10.3* 11.7 12.2 12.1 11.7   MCV 90 89 91 90 90    326 357 343 353   INR  --  1.02  --  1.03 0.98   NA  --  136 136 140 140   POTASSIUM  --  3.9 4.3 4.2 4.0   CHLORIDE  --  105 106 106 107   CO2  --  27 26 28 27   BUN  --  28 26 19 17   CR  --  0.77 0.77 0.74 0.72   ANIONGAP  --  4 4 6 6   ESTIVEN  --  9.1 9.7 9.4 9.3   GLC  --  100* 127* 132* 113*   ALBUMIN  --   --   --  3.7 3.7   PROTTOTAL  --   --   --  6.9 6.7*   BILITOTAL  --   --   --  0.3 0.3   ALKPHOS  --   --   --  87 86   ALT  --   --   --  35 35   AST  --   --   --  24 28     No results found for this or any previous visit (from the past 24 hour(s)).

## 2021-02-24 NOTE — PROGRESS NOTES
02/24/21 1500   Quick Adds   Type of Visit Initial PT Evaluation   Living Environment   People in home alone   Current Living Arrangements house   Home Accessibility stairs to enter home   Number of Stairs, Main Entrance 3   Stair Railings, Main Entrance railing on right side (ascending)   Transportation Anticipated family or friend will provide   Self-Care   Usual Activity Tolerance good   Current Activity Tolerance moderate   Regular Exercise Yes   Activity/Exercise Type walking   Exercise Amount/Frequency greater than 1 hr;daily   Equipment Currently Used at Home none   Activity/Exercise/Self-Care Comment I with all ADL's and mob   Disability/Function   Hearing Difficulty or Deaf no   Difficulty Communicating yes   Walking or Climbing Stairs Difficulty no   Toileting issues no   Fall history within last six months yes   Number of times patient has fallen within last six months 1   Change in Functional Status Since Onset of Current Illness/Injury yes  (decreased function in R UE recently somewhat weaker)   General Information   Onset of Illness/Injury or Date of Surgery 02/22/21   Referring Physician Mile Sher   Patient/Family Therapy Goals Statement (PT) none stated   Pertinent History of Current Problem (include personal factors and/or comorbidities that impact the POC) 75 year old female with history including asthma, depression/anxiety and right occipital meningioma, who presented 2/17/2021 with recent headache, speech and reading problems and was found by MRI with a new enhancing left frontal-parietal mass concerning for primary brain neoplasm.   Existing Precautions/Restrictions fall   Weight-Bearing Status - LUE full weight-bearing   Weight-Bearing Status - RUE full weight-bearing   Weight-Bearing Status - LLE full weight-bearing   Weight-Bearing Status - RLE full weight-bearing   Cognition   Orientation Status (Cognition) person;place;unable/difficult to assess  (difficulty with word finding)    Affect/Mental Status (Cognition) WFL   Follows Commands (Cognition) WFL   Cognitive Status Comments defer to OT assessment   Integumentary/Edema   Integumentary/Edema Comments incision on scalp   Range of Motion (ROM)   ROM Comment wfl   Strength Comprehensive (MMT)   Comment, General Manual Muscle Testing (MMT) Assessment noted lean to right, decreased use of R UE, able to squeeze hand but does not  walker   Strength   Strength Comments able to sit to stand with CGA   Bed Mobility   Comment (Bed Mobility) bed mob with min A and A for lines and tubes   Transfers   Transfer Safety Comments sit to stand with FWW with CGA, leans to R slightly   Gait/Stairs (Locomotion)   Sebastian Level (Gait) minimum assist (75% patient effort);1 person to manage equipment;2 person assist   Assistive Device (Gait) walker, front-wheeled   Distance in Feet (Required for LE Total Joints) 35'    Pattern (Gait) step-to   Deviations/Abnormal Patterns (Gait) right sided deviations;wyatt decreased;gait speed decreased   Right Sided Gait Deviations   (does not keep hand on . slips off to side)   Balance   Balance Comments decreased standing dynamic balance requires UE support and min/CGA to maintain   Coordination   Coordination Comments decreased coordination with R UE to  and place   Clinical Impression   Criteria for Skilled Therapeutic Intervention yes, treatment indicated   PT Diagnosis (PT) impaired amb and transfers   Influenced by the following impairments post op pain, decreased strength, balance, act josemanuel, neglect, cog deficits, coordination deficits   Functional limitations due to impairments impaired functional mob I and safety   Clinical Presentation Evolving/Changing   Clinical Presentation Rationale 3-5 deficits   Clinical Decision Making (Complexity) moderate complexity   Therapy Frequency (PT) Daily   Predicted Duration of Therapy Intervention (days/wks) 4 days   Planned Therapy Interventions (PT) balance  training;bed mobility training;gait training;neuromuscular re-education;stair training;strengthening;motor coordination training   Risk & Benefits of therapy have been explained evaluation/treatment results reviewed;care plan/treatment goals reviewed;participants included;patient;daughter   PT Discharge Planning    PT Discharge Recommendation (DC Rec) Acute [facility]hab [facility]-Motivated patient will benefit from intensive, interdisciplinary therapy.  Anticipate will be able to tolerate 3 hours of therapy per day   PT Rationale for DC Rec Patient would benefit from ongoing physical therapy in order to increase strength, activity tolerance and independence with safe, functional mobility.  Patient would benefit from intensive skilled therapies in ARU setting; patient is motivated to participate and would be able to tolerate 3 hours of therapy/day. Pt was previously I at home alone   PT Brief overview of current status  pt able to complete bed mob and tranfers with min A, leans to right some, noting R side neglect, especially with R UE, amb in room with FWW wiht min A/CGA and cues.   Total Evaluation Time   Total Evaluation Time (Minutes) 15

## 2021-02-24 NOTE — PLAN OF CARE
Arrived from PACU around 2245. L pupil larger than R at baseline. Minimally verbal on arrival, speech slow and slurred but became more verbal through out the night. RUE weaker, with pronator drift. Confused, disoriented to time and situation. Follows commands. Head incision with marked dried drainage. SBP goal less than 150, no interventions needed. VSS on RA. Tolerating clear liquids. Martino patent with adequate output. Art line not working, removed. PIV infusing LR. Plan for CT this am.

## 2021-02-24 NOTE — OP NOTE
Name of procedure: Left parietal craniotomy for excisional biopsy of a dominant parietal tumor; computer-assisted stereotactic volumetric resection of tumor using neuro navigation; use the operating microscope and    Preoperative diagnosis: Moderate-sized left parietal mass    Postoperative diagnosis: Same; probable high-grade glioma    Surgeon: Dario Cummings MD    First assistant: None    Material forwarded to laboratory for examination: Multiple specimens from the parietal mass for both frozen and permanent section    Description of the procedure: Patient was brought to the operating room where she is placed under suitable general endotracheal anesthesia and subsequently positioned in the right lateral decubitus position on an air mattress with a right axillary roll.  Her head was placed in a De Leon head gamble and her cervical spine was maintained in a neutral position.  Pressure points were carefully padded in the O-arm was subsequently used to perform an intraoperative CT scan.  This was merged with the preoperative stereotactic brain MRI scan with good registration.  Surface matching was verified and found to be quite accurate.  Surface representation of the left parietal tumor was drawn on the scalp surface and a small area of scalp was subsequently shaved and sterilely prepped and draped in usual fashion.  A slightly curvilinear vertical incision was then made beginning behind the left ear and extending over the area of the surface representation of the tumor to the high left parietal region.  Self-retaining retractors were placed and the surface representation was again sketched onto the exposed skull surface.  A single entry hole was then made using Midas Mikey B1 bur and a free bone flap was elevated and removed without difficulty.  Meningeal vessels were coagulated and the dura was then opened in a cruciate fashion after administering 20 g of mannitol to ensure brain relaxation.  The tumor was  immediately evident underlying presenting to the surface.  This was markedly abnormal appearing cerebral cortex.  There were areas of necrosis noted on the surface.  This was in places densely adherent to the overlying dura as well.  Multiple fragments were removed and submitted for frozen section.  Center part of the tumor was removed using the sono PET ultrasonic aspirator and additional fragments were sent for frozen and permanent section.  Meticulous dissection then proceeded with the operating microscope with volumetric resection of the tumor using the Stealth system as well as easily distinguishing visually markedly abnormal tissue within the cavity delineated with the navigation system.  When no additional tumor could be visualized within this cavity and the outline traced appeared to match the outline of the tumor, decision was made to stop tumor resection.  Frozen tissue diagnosis was returned during the case as showing probable high-grade glioma.  This was consistent with the radiographic and clinical appearance of the tumor.  Meticulous hemostasis was achieved in the tumor resection cavity using bipolar cautery and subsequently Surgicel used to line the cavity.  Gelfoam soaked in thrombin was placed over this but removed before closure.  Dura was reapproximated with 4-0 Nurolon suture.  Small peripheral holes were made in the edge of the craniotomy and for dural tack up stitches were placed.  DuraGen was used to cover the dural repair and several small pledgets of thrombin-soaked Gelfoam were placed around the margin before returning the bone flap into place.  Bone flap was secured using 3 titanium plates with 4 mm titanium screws placed in predrilled openings.  Wound was galo irrigated with Ancef containing irrigation and the self-retaining retractors were removed.  Galea was closed with a single layer of interrupted inverted 2-0 Vicryl suture.  Skin was closed with surgical staples and a sterile  dressing was applied.  Patient was then taken out of the De Leon head gamble, returned to the supine position with her head elevated where she was awakened extubated and transported to the recovery room in stable condition.

## 2021-02-24 NOTE — PROGRESS NOTES
[facility]    Neurosurgery  Daily Post-Op Note    Assessment & Plan   Procedure(s):  left parietal craniotomy for tumor resection   -1 Day Post-Op  Patient seen in ICU today. She is alert and oriented, but disoriented to time. Denies headache, dizziness, nausea, vision/speech changes. Able to follow commands. Moves all extremities, but there is some continued RUE weakness. Per nursing, this has somewhat improved since overnight. Incision CDI. Head CT completed this AM.     Plan:  Continue Keppra and Decadron  Brain MRI scheduled for tomorrow 2/25  Neuro checks q 4  Okay to transfer to the floor     Discussed with Dr. Zoila Rowell, CNP  [facility] Neurosurgery  [facility]  [address]  [place] [address]  Tel [phone]  Pager [phone]      Interval History   Stable    Physical Exam   Temp: 99.2  F (37.3  C) Temp src: Oral BP: 121/64 Pulse: 61   Resp: 11 SpO2: 91 % O2 Device: None (Room air) Oxygen Delivery: 2 LPM  Vitals:    02/20/21 0506 02/22/21 0625 02/24/21 0100   Weight: 150 lb 12.8 oz (68.4 kg) 152 lb (68.9 kg) 162 lb 14.7 oz (73.9 kg)     Vital Signs with Ranges  Temp:  [97.5  F (36.4  C)-99.2  F (37.3  C)] 99.2  F (37.3  C)  Pulse:  [57-69] 61  Resp:  [8-64] 11  BP: (101-176)/(61-94) 121/64  MAP:  [14 mmHg-296 mmHg] 14 mmHg  Arterial Line BP: ()/() 14/14  SpO2:  [91 %-100 %] 91 %  I/O last 3 completed shifts:  In: 2909 [I.V.:2909]  Out: 1125 [Urine:1125]    Mental status:  Alert, disoriented to time, speech is fluent.  Cranial nerves:  II-XII intact.   Motor:  Moves all extremities. RUE weakness noted.   Sensation:  Intact  Coordination:  Smooth finger to nose testing. Negative drift.     Incision: CDI      Medications     lactated ringers 100 mL/hr at 02/24/21 1041     niCARdipine          amLODIPine  5 mg Oral Daily     busPIRone HCl  30 mg Oral BID     ceFAZolin  1 g Intravenous Q8H     dexamethasone  4 mg  Intravenous Q6H     famotidine  20 mg Oral BID     FLUoxetine  80 mg Oral Daily     insulin aspart  1-10 Units Subcutaneous TID AC     insulin aspart  1-7 Units Subcutaneous At Bedtime     levETIRAcetam  500 mg Intravenous Q12H     sennosides  2 tablet Oral BID       Monika Rowell [facility] Neurosurgery   [address] [place] [address]  Tel [phone]  Pager [phone]

## 2021-02-24 NOTE — PROGRESS NOTES
SPIRITUAL HEALTH SERVICES Progress Note  [facility] ICU    Referral Source: Follow-up visit    Visited briefly with PT and daughter in ICU. PT was alert and smiling and indicated the surgery had gone well.    Offered a moment of silence and prayer for recovery and well-being of PT.    Continue to follow PT while she remains in hospital.      Doug Silver  Chaplain Resident

## 2021-02-25 ENCOUNTER — APPOINTMENT (OUTPATIENT)
Dept: SPEECH THERAPY | Facility: CLINIC | Age: 76
DRG: 025 | End: 2021-02-25
Payer: MEDICARE

## 2021-02-25 ENCOUNTER — APPOINTMENT (OUTPATIENT)
Dept: MRI IMAGING | Facility: CLINIC | Age: 76
DRG: 025 | End: 2021-02-25
Attending: HOSPITALIST
Payer: MEDICARE

## 2021-02-25 ENCOUNTER — APPOINTMENT (OUTPATIENT)
Dept: OCCUPATIONAL THERAPY | Facility: CLINIC | Age: 76
DRG: 025 | End: 2021-02-25
Payer: MEDICARE

## 2021-02-25 LAB
BASOPHILS # BLD AUTO: 0 10E9/L (ref 0–0.2)
BASOPHILS NFR BLD AUTO: 0.1 %
DIFFERENTIAL METHOD BLD: ABNORMAL
EOSINOPHIL # BLD AUTO: 0 10E9/L (ref 0–0.7)
EOSINOPHIL NFR BLD AUTO: 0 %
ERYTHROCYTE [DISTWIDTH] IN BLOOD BY AUTOMATED COUNT: 13.5 % (ref 10–15)
GLUCOSE BLDC GLUCOMTR-MCNC: 103 MG/DL (ref 70–99)
GLUCOSE BLDC GLUCOMTR-MCNC: 131 MG/DL (ref 70–99)
GLUCOSE BLDC GLUCOMTR-MCNC: 135 MG/DL (ref 70–99)
GLUCOSE BLDC GLUCOMTR-MCNC: 168 MG/DL (ref 70–99)
HCT VFR BLD AUTO: 31.1 % (ref 35–47)
HGB BLD-MCNC: 9.9 G/DL (ref 11.7–15.7)
IMM GRANULOCYTES # BLD: 0.3 10E9/L (ref 0–0.4)
IMM GRANULOCYTES NFR BLD: 1.8 %
LYMPHOCYTES # BLD AUTO: 0.6 10E9/L (ref 0.8–5.3)
LYMPHOCYTES NFR BLD AUTO: 3.9 %
MCH RBC QN AUTO: 28.3 PG (ref 26.5–33)
MCHC RBC AUTO-ENTMCNC: 31.8 G/DL (ref 31.5–36.5)
MCV RBC AUTO: 89 FL (ref 78–100)
MONOCYTES # BLD AUTO: 1.4 10E9/L (ref 0–1.3)
MONOCYTES NFR BLD AUTO: 9.5 %
NEUTROPHILS # BLD AUTO: 12.2 10E9/L (ref 1.6–8.3)
NEUTROPHILS NFR BLD AUTO: 84.7 %
NRBC # BLD AUTO: 0 10*3/UL
NRBC BLD AUTO-RTO: 0 /100
PLATELET # BLD AUTO: 298 10E9/L (ref 150–450)
RBC # BLD AUTO: 3.5 10E12/L (ref 3.8–5.2)
WBC # BLD AUTO: 14.4 10E9/L (ref 4–11)

## 2021-02-25 PROCEDURE — 99232 SBSQ HOSP IP/OBS MODERATE 35: CPT | Performed by: HOSPITALIST

## 2021-02-25 PROCEDURE — 999N001017 HC STATISTIC GLUCOSE BY METER IP

## 2021-02-25 PROCEDURE — 97535 SELF CARE MNGMENT TRAINING: CPT | Mod: GO | Performed by: OCCUPATIONAL THERAPIST

## 2021-02-25 PROCEDURE — 36415 COLL VENOUS BLD VENIPUNCTURE: CPT | Performed by: HOSPITALIST

## 2021-02-25 PROCEDURE — 120N000001 HC R&B MED SURG/OB

## 2021-02-25 PROCEDURE — 250N000011 HC RX IP 250 OP 636: Performed by: HOSPITALIST

## 2021-02-25 PROCEDURE — 85025 COMPLETE CBC W/AUTO DIFF WBC: CPT | Performed by: HOSPITALIST

## 2021-02-25 PROCEDURE — 250N000013 HC RX MED GY IP 250 OP 250 PS 637: Performed by: HOSPITALIST

## 2021-02-25 PROCEDURE — 92526 ORAL FUNCTION THERAPY: CPT | Mod: GN

## 2021-02-25 PROCEDURE — 255N000002 HC RX 255 OP 636: Performed by: INTERNAL MEDICINE

## 2021-02-25 PROCEDURE — 70553 MRI BRAIN STEM W/O & W/DYE: CPT | Mod: MG

## 2021-02-25 PROCEDURE — A9585 GADOBUTROL INJECTION: HCPCS | Performed by: INTERNAL MEDICINE

## 2021-02-25 RX ORDER — GADOBUTROL 604.72 MG/ML
7 INJECTION INTRAVENOUS ONCE
Status: COMPLETED | OUTPATIENT
Start: 2021-02-25 | End: 2021-02-25

## 2021-02-25 RX ORDER — ACETAMINOPHEN 500 MG
500 TABLET ORAL EVERY 6 HOURS
Status: DISCONTINUED | OUTPATIENT
Start: 2021-02-25 | End: 2021-02-27

## 2021-02-25 RX ADMIN — ACETAMINOPHEN 500 MG: 500 TABLET, FILM COATED ORAL at 10:41

## 2021-02-25 RX ADMIN — STANDARDIZED SENNA CONCENTRATE 2 TABLET: 8.6 TABLET ORAL at 20:07

## 2021-02-25 RX ADMIN — FAMOTIDINE 20 MG: 20 TABLET ORAL at 09:17

## 2021-02-25 RX ADMIN — DEXAMETHASONE SODIUM PHOSPHATE 4 MG: 4 INJECTION, SOLUTION INTRA-ARTICULAR; INTRALESIONAL; INTRAMUSCULAR; INTRAVENOUS; SOFT TISSUE at 10:37

## 2021-02-25 RX ADMIN — FLUOXETINE 80 MG: 20 CAPSULE ORAL at 09:16

## 2021-02-25 RX ADMIN — ACETAMINOPHEN 500 MG: 500 TABLET, FILM COATED ORAL at 04:38

## 2021-02-25 RX ADMIN — GADOBUTROL 7 ML: 604.72 INJECTION INTRAVENOUS at 17:42

## 2021-02-25 RX ADMIN — STANDARDIZED SENNA CONCENTRATE 2 TABLET: 8.6 TABLET ORAL at 09:17

## 2021-02-25 RX ADMIN — DEXAMETHASONE SODIUM PHOSPHATE 4 MG: 4 INJECTION, SOLUTION INTRA-ARTICULAR; INTRALESIONAL; INTRAMUSCULAR; INTRAVENOUS; SOFT TISSUE at 17:18

## 2021-02-25 RX ADMIN — BUSPIRONE HYDROCHLORIDE 30 MG: 15 TABLET ORAL at 20:07

## 2021-02-25 RX ADMIN — FAMOTIDINE 20 MG: 20 TABLET ORAL at 20:06

## 2021-02-25 RX ADMIN — BUSPIRONE HYDROCHLORIDE 30 MG: 15 TABLET ORAL at 09:17

## 2021-02-25 RX ADMIN — AMLODIPINE BESYLATE 5 MG: 5 TABLET ORAL at 09:17

## 2021-02-25 RX ADMIN — ACETAMINOPHEN 500 MG: 500 TABLET, FILM COATED ORAL at 17:17

## 2021-02-25 RX ADMIN — DEXAMETHASONE SODIUM PHOSPHATE 4 MG: 4 INJECTION, SOLUTION INTRA-ARTICULAR; INTRALESIONAL; INTRAMUSCULAR; INTRAVENOUS; SOFT TISSUE at 04:38

## 2021-02-25 RX ADMIN — LEVETIRACETAM 500 MG: 5 INJECTION, SOLUTION INTRAVENOUS at 11:03

## 2021-02-25 ASSESSMENT — ACTIVITIES OF DAILY LIVING (ADL)
ADLS_ACUITY_SCORE: 20
ADLS_ACUITY_SCORE: 19

## 2021-02-25 NOTE — PROGRESS NOTES
Pt POD 2 today of a L crani resection. A&Ox3, most consistently missing the year. Neuros exhibit WFD, slight R drift w/ some uncoordinated movements but pt does show some improvement this morning. VSS on RA, SBP w/i parameters. Tele NSR. DD2 diet, thin liquids w/ supervision, NO STRAWS. Takes pills whole, one at a time. Up with 1, GB and W, HOB 30 degrees. HA pain at bedtime, tylenol effective. Pt slightly tearful and anxious about R arm difficulties, ativan effective and pt reported sleeping comfortably overnight. Martino in place, no BM overnight. Plan to work w/ PT/OT/SLP, nrsng cont to monitor.

## 2021-02-25 NOTE — PLAN OF CARE
POD 2 left crani for tumor resection.  Neuros stable.  Disorientated to date.  Word finding difficulties noted.  Forgetful.  At second assessment patient temporarily was not able to stick out tongue but then was able to.  Tylenol as needed for pain, changed to scheduled, has denied pain today. Left head incision intact with staples, moderated dried drainage, scant bleeding noted with dressing change.  Good appetite.  Martino removed, .  Up with assist 1, gait belt and walker.  Plan for discharge to ARU when ready.

## 2021-02-25 NOTE — PLAN OF CARE
Patient transferred from ICU this evening. A&O x2, patient tearful. VS stable on room air. Up with assist x1 with walker and gait belt. HOB kept greater than 30 degrees. Denied pain. Left head incision covered with dressing, some dried drainage. Neuros intact except slow speech, word-finding difficulty and right pronator drift. Tolerated DD2 with thins, supervision and assistance with eating provided. Plan to work with therapies. Discharge plan pending.

## 2021-02-25 NOTE — PLAN OF CARE
Neuro: Right side ataxic finger to nose. Right pronator drift.   Cardio: BP WDL  Resp: Room air LS clear  GI: BS active  : Martino patent  Pain: Denies  Up with assist of 1 GB and walker  Transferred to station 73 this shift.

## 2021-02-25 NOTE — PROGRESS NOTES
"[facility]    Neurosurgery Progress Note    Date of Service (when I saw the patient): 02/25/2021     Assessment & Plan   Olga Bailey is a 75 year old female with a history of skin carcinoma and meningioma (followed by neurologist Dr. Jayjay Tomlin) who was admitted on 2/17/2021. She presented with a brain mass. She is s/p left parietal craniotomy for tumor resection with Dr. Cummings. Today she was seen sitting up in the chair. She reports a mild headache. She denies dizziness, nausea/vomiting, and vision changes. She continues to note improvement in her right arm/hand. She continues to have word finding difficulties which have improved since yesterday.    Principal Problem:    Brain mass, 3.3 cm left Frontal Parietal     Assessment: s/p left parietal craniotomy    Plan:   -Continue decadron and keppra  -Brain MRI today  -Continue working with therapies, may require TCU placement at discharge    Funmilayo Hampton CNP  [facility] Neurosurgery  [place] [address]    Tel [phone]  Pager [phone]      Interval History   Stable.    Physical Exam   Temp: 97.9  F (36.6  C) Temp src: Oral BP: (!) 149/72 Pulse: 59   Resp: 16 SpO2: 97 % O2 Device: None (Room air)    Vitals:    02/20/21 0506 02/22/21 0625 02/24/21 0100   Weight: 150 lb 12.8 oz (68.4 kg) 152 lb (68.9 kg) 162 lb 14.7 oz (73.9 kg)     Vital Signs with Ranges  Temp:  [97.6  F (36.4  C)-99  F (37.2  C)] 97.9  F (36.6  C)  Pulse:  [59-77] 59  Resp:  [12-30] 16  BP: (107-149)/(59-92) 149/72  SpO2:  [84 %-97 %] 97 %  I/O last 3 completed shifts:  In: 2055 [P.O.:930; I.V.:1125]  Out: 1910 [Urine:1910]     , Blood pressure (!) 149/72, pulse 59, temperature 97.9  F (36.6  C), temperature source Oral, resp. rate 16, height 5' 2.52\" (1.588 m), weight 162 lb 14.7 oz (73.9 kg), SpO2 97 %.  162 lbs 14.72 oz  HEENT:  Normocephalic.    Heart:  No peripheral edema  Lungs:  " No SOB  Skin:  Warm and dry, good capillary refill. Incision intact.  Extremities:  Good radial and dorsalis pedis pulses bilaterally, no edema, cyanosis or clubbing.    NEUROLOGICAL EXAMINATION:   Mental status:  Alert and follows commands.  Motor:  Strength is 5/5 throughout the upper and lower extremities except RUE slightly weaker.    Medications     lactated ringers 100 mL/hr at 02/24/21 1041     niCARdipine         amLODIPine  5 mg Oral Daily     busPIRone HCl  30 mg Oral BID     dexamethasone  4 mg Intravenous Q6H     famotidine  20 mg Oral BID     FLUoxetine  80 mg Oral Daily     insulin aspart  1-10 Units Subcutaneous TID AC     insulin aspart  1-7 Units Subcutaneous At Bedtime     levETIRAcetam  500 mg Intravenous Q12H     sennosides  2 tablet Oral BID       Data     CBC RESULTS:   Recent Labs   Lab Test 02/18/21  0735   WBC 7.0   RBC 4.17   HGB 12.1   HCT 37.6   MCV 90   MCH 29.0   MCHC 32.2   RDW 13.5        Basic Metabolic Panel:  Lab Results   Component Value Date     02/18/2021      Lab Results   Component Value Date    POTASSIUM 4.2 02/18/2021     Lab Results   Component Value Date    CHLORIDE 106 02/18/2021     Lab Results   Component Value Date    ESTIVEN 9.4 02/18/2021     Lab Results   Component Value Date    CO2 28 02/18/2021     Lab Results   Component Value Date    BUN 19 02/18/2021     Lab Results   Component Value Date    CR 0.74 02/18/2021     Lab Results   Component Value Date     02/18/2021     INR:  Lab Results   Component Value Date    INR 1.03 02/18/2021    INR 0.98 02/17/2021

## 2021-02-25 NOTE — CONSULTS
Care Management Initial Consult    General Information  Assessment completed with: Family, (La Nena)  Type of CM/SW Visit: Initial Assessment    Primary Care Provider verified and updated as needed:     Readmission within the last 30 days:        Reason for Consult: discharge planning  Advance Care Planning:            Communication Assessment  Patient's communication style: spoken language (English or Bilingual)    Hearing Difficulty or Deaf: no   Wear Glasses or Blind: yes    Cognitive  Cognitive/Neuro/Behavioral: .WDL except  Level of Consciousness: alert  Arousal Level: opens eyes spontaneously  Orientation: disoriented to, time  Mood/Behavior: calm, cooperative  Best Language: 1 - Mild to moderate  Speech: word-finding difficulty    Living Environment:   People in home: alone     Current living Arrangements: house      Able to return to prior arrangements: other (see comments)       Family/Social Support:  Care provided by: self  Provides care for:    Marital Status: Single  Children          Description of Support System: Supportive, Involved    Support Assessment: Adequate family and caregiver support  Pt has a daughter La Nena who is RN here at [facility]. She has a son living in [facility] and a son in [place].    Current Resources:   Patient receiving home care services:  No. Pt has been fiercly independent. Pt reached out to daughter after noticing a cognitive decline.   Community Resources:  Life line discussed as option at later date. Home care option also discussed post rehab.  Equipment currently used at home: none  Supplies currently used at home:      Employment/Financial:  Employment Status:   retired  RN     Financial Concerns:  Pt has Medicare and BC/BS.     Lifestyle & Psychosocial Needs:        Socioeconomic History     Marital status: Single     Spouse name: Not on file     Number of children: Not on file     Years of education: Not on file     Highest education level: Not on  file   Occupational History     Occupation: nurse     Employer: [employer]     Tobacco Use     Smoking status: Never Smoker     Smokeless tobacco: Never Used   Substance and Sexual Activity     Alcohol use: Yes     Comment: very rare     Drug use: No     Sexual activity: Not Currently       Functional Status:  Prior to admission patient needed assistance: Pt lives alone in her home and has been independent at baseline.  Pt lives in a single story home with full flight of stairs to the basement area. Pt only needs basement for laundry.    Mental Health Status:       Chemical Dependency Status:          Values/Beliefs:  Spiritual, Cultural Beliefs, Restoration Practices, Values that affect care:                 Additional Information:  Pt has been recommended for ARU  Rehab. Pt would return home or rather discharge to daughter's home if need be prior to any oncology treatment beginning.  Pt's daughter, La Nena indicates that pt does not like surprises and this new diagnosis of parietal mass has left them reeling. Awaiting biopsy results and final treatment plan.    ARU is recommended and referrals sent via [facility].       DAYNA Nagy  [facility]  [phone]

## 2021-02-25 NOTE — PROGRESS NOTES
[facility]    Medicine Progress Note - Hospitalist Service       Date of Admission:  2/17/2021  Assessment & Plan       75 year old female with history including asthma, depression/anxiety and right occipital meningioma, who presented 2/17/2021 with recent headache, speech and reading problems and was found by MRI with a new enhancing left frontal-parietal mass concerning for primary brain neoplasm.     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 MRI ordered in clinic 2/17 an enhancing 3.3 x 2.7 x 2.9 cm intra-axial mass in the lateral left frontoparietal region with signs of moderate amount of edema w/o midline shift, overall concerning for high grade primary brain neoplasm; unchanged small right occipital meningioma. Subsequently sent to [facility] and admitted.   * Started on IV dexamethasone. Neurosurgery consulted. CT CAP 2/18 did not show signs of a primary malignancy. MRI brain with contrast 2/19 showed a 3.3 x 2.8 x 2.8 cm enhancing mass in left frontal parietal region with dural base, could be intra-axial or extra-axial; 0.5 x 1.0 x 1.0 cm enhancing mass in right occipital lobe which was dural based, appeared to be extra-axial.  - Decadron and keppra per NS, periop abx at their discretion as well  - Continue famotidine for GI prophylaxis.  - APAP scheduled, after discussion with patient and her daughter  - await pathology, anticipate NS to consult Oncology at or before that point for ongoing planning and management  - some increase in exp aphasia - word finding 2/24, keppra, decadron continued, goal sbp <150 per NS.  - in ICU after surgery, ok for transfer back to floor per NS  - 2/25 - overall improvement reported today per patient and her daughter in room.  No new focal  neurological deficits.  - continue working with therapies - TCU vs ARU anticipated     Acute anemia, likely blood loss in post operative setting  Adm hgb 11.7; 10.3 on 2/24 and 9.9 on 2/25 no signs or reports of bleeding clinically.   - Continue to monitor.    Hyperglycemia, mild, secondary to steroids  Hgb A1C 5.4 2/18/2021. Sugars largely 90s-130.  - Continue ISS.     Hypertension, possibly due to steroids or underlying benign essential HTN  BP's frequently elevated this hospitalization. Started on amlodipine 2/21.  - Continue amlodipine 5 mg daily (was increased 2/22) - full effect likely a couple days so will watch and utilize PRN as below  - Continue PRN IV hydralazine and PRN IV labetalol.  - 2/25 AM SBP up to 140s (still under threshold of 150), subsequently improved     Leukocytosis, suspect due to steroids and post op state  No clear signs of infection. WBC 12.8 on 2/20/21, normal on adm.  - Monitor clinically for signs of infection.  - WBC 19k on pod 1, afebrile. Periop abx per surgery --> wbc 14k 2/25  - Monitor CBC.     Depression/anxiety  Bupropion held because of potential to lower seizure threshold. Started on PRN lorazepam this hospitalization.  - Continue buspirone, fluoxetine.   - Continue PRN PO lorazepam 0.5 mg TID.     Asthma  Not on bronchodilators PTA.  - Monitor clinically. CTABL 2/23 - 2/25.     COVID-19 testing  2/17/21 PCR negative, 8/8/20 PCR negative      Diet: Snacks/Supplements Adult: Boost Plus; Between Meals  Dysphagia Diet Level 2 [facility] Altered Thin Liquids (water, ice chips, juice, milk gelatin, ice cream, etc); No Straws    DVT Prophylaxis: Pneumatic Compression Devices  Martino Catheter: in place, indication: Strict 1-2 Hour I&O  Code Status: Full Code           Disposition Plan   Expected discharge:   Entered: Duke Bernabe MD 02/25/2021, 7:19 AM       The patient's care was discussed with the Bedside Nurse, Patient and Patient's Family.    Duke Bernabe MD  Hospitalist  [facility]  Contact information available via [facility] Paging/Directory    ______________________________________________________________________    Interval History   No acute events overnight.  Has had some waxing and waning disorientation, difficulty sticking out tongue, and ongoing word finding difficulty.  But overall improved today.  No new focal neurological deficits.  Denies shortness of breath, fevers, chills, headache, vision changes, or chest pain.    Data reviewed today: I reviewed all medications, new labs and imaging results over the last 24 hours. I personally reviewed no images or EKG's today.    Physical Exam   Vital Signs: Temp: 97.6  F (36.4  C) Temp src: Oral BP: (!) 141/75 Pulse: 63   Resp: 16 SpO2: 95 % O2 Device: None (Room air)    Weight: 162 lbs 14.72 oz    Gen: NAD, pleasant  HEENT: Normocephalic, EOMI, MMM  Resp: no crackles,  no wheezes, no increased work of resp  CV: S1S2 heard, reg rhythm, reg rate, no pedal edema  Abdo: soft, nontender, nondistended, bowel sounds present  Ext: calves nontender, well perfused  Neuro: AAOx3, CN grossly intact, no facial asymmetry, moving all 4 extremities - strength appropriate overall, very slight weakness in R arm/hand compared to left (not new)      Data   Recent Labs   Lab 02/25/21  0750 02/24/21  0703 02/22/21  0859 02/20/21  0929   WBC 14.4* 19.9* 10.9 12.8*   HGB 9.9* 10.3* 11.7 12.2   MCV 89 90 89 91    312 326 357   INR  --   --  1.02  --    NA  --  135 136 136   POTASSIUM  --  4.3 3.9 4.3   CHLORIDE  --  102 105 106   CO2  --  27 27 26   BUN  --  28 28 26   CR  --  0.64 0.77 0.77   ANIONGAP  --  6 4 4   ESTIVEN  --  8.5 9.1 9.7   GLC  --  110* 100* 127*     No results found for this or any previous visit (from the past 24 hour(s)).

## 2021-02-26 ENCOUNTER — APPOINTMENT (OUTPATIENT)
Dept: OCCUPATIONAL THERAPY | Facility: CLINIC | Age: 76
DRG: 025 | End: 2021-02-26
Payer: MEDICARE

## 2021-02-26 ENCOUNTER — APPOINTMENT (OUTPATIENT)
Dept: SPEECH THERAPY | Facility: CLINIC | Age: 76
DRG: 025 | End: 2021-02-26
Payer: MEDICARE

## 2021-02-26 ENCOUNTER — APPOINTMENT (OUTPATIENT)
Dept: PHYSICAL THERAPY | Facility: CLINIC | Age: 76
DRG: 025 | End: 2021-02-26
Payer: MEDICARE

## 2021-02-26 LAB
BASOPHILS # BLD AUTO: 0 10E9/L (ref 0–0.2)
BASOPHILS NFR BLD AUTO: 0.1 %
DIFFERENTIAL METHOD BLD: ABNORMAL
EOSINOPHIL # BLD AUTO: 0 10E9/L (ref 0–0.7)
EOSINOPHIL NFR BLD AUTO: 0 %
ERYTHROCYTE [DISTWIDTH] IN BLOOD BY AUTOMATED COUNT: 13.4 % (ref 10–15)
GLUCOSE BLDC GLUCOMTR-MCNC: 118 MG/DL (ref 70–99)
GLUCOSE BLDC GLUCOMTR-MCNC: 120 MG/DL (ref 70–99)
GLUCOSE BLDC GLUCOMTR-MCNC: 122 MG/DL (ref 70–99)
GLUCOSE BLDC GLUCOMTR-MCNC: 151 MG/DL (ref 70–99)
GLUCOSE BLDC GLUCOMTR-MCNC: 181 MG/DL (ref 70–99)
HCT VFR BLD AUTO: 30.7 % (ref 35–47)
HGB BLD-MCNC: 9.7 G/DL (ref 11.7–15.7)
IMM GRANULOCYTES # BLD: 0.5 10E9/L (ref 0–0.4)
IMM GRANULOCYTES NFR BLD: 3.6 %
LYMPHOCYTES # BLD AUTO: 0.6 10E9/L (ref 0.8–5.3)
LYMPHOCYTES NFR BLD AUTO: 4.4 %
MCH RBC QN AUTO: 28.2 PG (ref 26.5–33)
MCHC RBC AUTO-ENTMCNC: 31.6 G/DL (ref 31.5–36.5)
MCV RBC AUTO: 89 FL (ref 78–100)
MONOCYTES # BLD AUTO: 1 10E9/L (ref 0–1.3)
MONOCYTES NFR BLD AUTO: 7.2 %
NEUTROPHILS # BLD AUTO: 12.1 10E9/L (ref 1.6–8.3)
NEUTROPHILS NFR BLD AUTO: 84.7 %
NRBC # BLD AUTO: 0 10*3/UL
NRBC BLD AUTO-RTO: 0 /100
PLATELET # BLD AUTO: 302 10E9/L (ref 150–450)
RBC # BLD AUTO: 3.44 10E12/L (ref 3.8–5.2)
WBC # BLD AUTO: 14.3 10E9/L (ref 4–11)

## 2021-02-26 PROCEDURE — 36415 COLL VENOUS BLD VENIPUNCTURE: CPT | Performed by: HOSPITALIST

## 2021-02-26 PROCEDURE — 85025 COMPLETE CBC W/AUTO DIFF WBC: CPT | Performed by: HOSPITALIST

## 2021-02-26 PROCEDURE — 92523 SPEECH SOUND LANG COMPREHEN: CPT | Mod: GN | Performed by: SPEECH-LANGUAGE PATHOLOGIST

## 2021-02-26 PROCEDURE — 250N000013 HC RX MED GY IP 250 OP 250 PS 637: Performed by: HOSPITALIST

## 2021-02-26 PROCEDURE — 92526 ORAL FUNCTION THERAPY: CPT | Mod: GN | Performed by: SPEECH-LANGUAGE PATHOLOGIST

## 2021-02-26 PROCEDURE — 97535 SELF CARE MNGMENT TRAINING: CPT | Mod: GO | Performed by: OCCUPATIONAL THERAPIST

## 2021-02-26 PROCEDURE — 120N000001 HC R&B MED SURG/OB

## 2021-02-26 PROCEDURE — 250N000011 HC RX IP 250 OP 636: Performed by: HOSPITALIST

## 2021-02-26 PROCEDURE — 250N000013 HC RX MED GY IP 250 OP 250 PS 637: Performed by: NURSE PRACTITIONER

## 2021-02-26 PROCEDURE — 99232 SBSQ HOSP IP/OBS MODERATE 35: CPT | Performed by: HOSPITALIST

## 2021-02-26 PROCEDURE — 250N000011 HC RX IP 250 OP 636: Performed by: NURSE PRACTITIONER

## 2021-02-26 PROCEDURE — 97116 GAIT TRAINING THERAPY: CPT | Mod: GP | Performed by: PHYSICAL THERAPIST

## 2021-02-26 PROCEDURE — 97530 THERAPEUTIC ACTIVITIES: CPT | Mod: GO | Performed by: OCCUPATIONAL THERAPIST

## 2021-02-26 PROCEDURE — 97750 PHYSICAL PERFORMANCE TEST: CPT | Mod: GP | Performed by: PHYSICAL THERAPIST

## 2021-02-26 PROCEDURE — 999N001017 HC STATISTIC GLUCOSE BY METER IP

## 2021-02-26 RX ORDER — LEVETIRACETAM 500 MG/1
500 TABLET ORAL 2 TIMES DAILY
Status: DISCONTINUED | OUTPATIENT
Start: 2021-02-26 | End: 2021-03-01 | Stop reason: HOSPADM

## 2021-02-26 RX ORDER — AMLODIPINE BESYLATE 10 MG/1
10 TABLET ORAL DAILY
Status: DISCONTINUED | OUTPATIENT
Start: 2021-02-26 | End: 2021-03-01 | Stop reason: HOSPADM

## 2021-02-26 RX ADMIN — DEXAMETHASONE SODIUM PHOSPHATE 4 MG: 4 INJECTION, SOLUTION INTRA-ARTICULAR; INTRALESIONAL; INTRAMUSCULAR; INTRAVENOUS; SOFT TISSUE at 00:06

## 2021-02-26 RX ADMIN — FAMOTIDINE 20 MG: 20 TABLET ORAL at 09:05

## 2021-02-26 RX ADMIN — BUSPIRONE HYDROCHLORIDE 30 MG: 15 TABLET ORAL at 20:07

## 2021-02-26 RX ADMIN — BUSPIRONE HYDROCHLORIDE 30 MG: 15 TABLET ORAL at 09:05

## 2021-02-26 RX ADMIN — AMLODIPINE BESYLATE 10 MG: 10 TABLET ORAL at 09:05

## 2021-02-26 RX ADMIN — INSULIN ASPART 1 UNITS: 100 INJECTION, SOLUTION INTRAVENOUS; SUBCUTANEOUS at 18:06

## 2021-02-26 RX ADMIN — LEVETIRACETAM 500 MG: 500 TABLET, FILM COATED ORAL at 22:01

## 2021-02-26 RX ADMIN — DEXAMETHASONE SODIUM PHOSPHATE 4 MG: 4 INJECTION, SOLUTION INTRA-ARTICULAR; INTRALESIONAL; INTRAMUSCULAR; INTRAVENOUS; SOFT TISSUE at 17:03

## 2021-02-26 RX ADMIN — DEXAMETHASONE SODIUM PHOSPHATE 4 MG: 4 INJECTION, SOLUTION INTRA-ARTICULAR; INTRALESIONAL; INTRAMUSCULAR; INTRAVENOUS; SOFT TISSUE at 04:42

## 2021-02-26 RX ADMIN — STANDARDIZED SENNA CONCENTRATE 2 TABLET: 8.6 TABLET ORAL at 09:04

## 2021-02-26 RX ADMIN — ACETAMINOPHEN 500 MG: 500 TABLET, FILM COATED ORAL at 04:42

## 2021-02-26 RX ADMIN — ACETAMINOPHEN 500 MG: 500 TABLET, FILM COATED ORAL at 00:05

## 2021-02-26 RX ADMIN — LORAZEPAM 0.5 MG: 0.5 TABLET ORAL at 00:05

## 2021-02-26 RX ADMIN — DEXAMETHASONE SODIUM PHOSPHATE 4 MG: 4 INJECTION, SOLUTION INTRA-ARTICULAR; INTRALESIONAL; INTRAMUSCULAR; INTRAVENOUS; SOFT TISSUE at 10:49

## 2021-02-26 RX ADMIN — LEVETIRACETAM 500 MG: 5 INJECTION, SOLUTION INTRAVENOUS at 00:06

## 2021-02-26 RX ADMIN — FLUOXETINE 80 MG: 20 CAPSULE ORAL at 09:04

## 2021-02-26 RX ADMIN — FAMOTIDINE 20 MG: 20 TABLET ORAL at 20:07

## 2021-02-26 RX ADMIN — INSULIN ASPART 2 UNITS: 100 INJECTION, SOLUTION INTRAVENOUS; SUBCUTANEOUS at 13:32

## 2021-02-26 RX ADMIN — ACETAMINOPHEN 500 MG: 500 TABLET, FILM COATED ORAL at 10:49

## 2021-02-26 RX ADMIN — LEVETIRACETAM 500 MG: 5 INJECTION, SOLUTION INTRAVENOUS at 10:56

## 2021-02-26 RX ADMIN — ACETAMINOPHEN 500 MG: 500 TABLET, FILM COATED ORAL at 17:03

## 2021-02-26 RX ADMIN — LORAZEPAM 0.5 MG: 0.5 TABLET ORAL at 22:01

## 2021-02-26 ASSESSMENT — ACTIVITIES OF DAILY LIVING (ADL)
ADLS_ACUITY_SCORE: 19

## 2021-02-26 ASSESSMENT — MIFFLIN-ST. JEOR: SCORE: 1200.86

## 2021-02-26 NOTE — PLAN OF CARE
Pt here POD2 with left crani  with tumor resection.  Alert, disoriented to time, easily redirectable. Neuros deficits of  WFD and forgetful. VSS on RA and below given parameters of SBP< 150. Tele discontinued. Incision to left head, CDI.  Tolerating DD2 with thin liquid diet, good appetite. Up A1/gb/walker. Voiding adequately, PVR WDL. Takes pills whole with water. Denies pain, schedule tylenol. Repeat MRI completed. Pt scoring green on the Aggression Stop Light Tool. Pathology pending. ARU at discharge.

## 2021-02-26 NOTE — PLAN OF CARE
POD 3 Crani for left brain mass.  Neuros stable.  Disorientated to year.  Word finding difficulty.  Slight RUE drift intermittently.  Left head incision CDI with staples, open to air. Denies pain, on scheduled tylenol.  Blood pressures within parameters.  Elevated blood glucose covered with sliding scale insulin.  Bowel movement today.  Up in chair and to bathroom with SBA, walker, gait belt.  Plan for discharge to ARU when ready.

## 2021-02-26 NOTE — PROVIDER NOTIFICATION
Amcom page to Nasima Neurosurg PA regarding MRI results. Awaiting call back/further orders.    Addendum: Call back from [facility]ima, No additional concerns with MRI report

## 2021-02-26 NOTE — PLAN OF CARE
Pt here for a tumor resection, POD #3 from a L. crani. Alert, disoriented to time. Neuros show pt has WFD and can be forgetful at times, intact otherwise. Dressing is WNL, CDI. VSS ex: HTN, within parameters to keep SBP <150. DD2 diet, thin liquids. Takes pills whole with water. Up with A1 using GB/walker. Denies pain, only administering scheduled tylenol. Pt scoring green on the Aggression Stop Light Tool. Pathology pending. Pt will discharge to ARU .

## 2021-02-26 NOTE — PROGRESS NOTES
"[facility]    Neurosurgery Progress Note    Date of Service (when I saw the patient): 02/26/2021     Assessment & Plan   Olga Bailey is a 75 year old female with a history of skin carcinoma and meningioma (followed by neurologist Dr. Jayjay Tomlin) who was admitted on 2/17/2021. She presented with a brain mass. She is s/p left parietal craniotomy for tumor resection with Dr. Cummings. Today she was seen lying in bed. She reports a mild headache. She denies dizziness, nausea/vomiting, and vision changes. She continues to note improvement in her right arm/hand. She continues to have word finding difficulties which have improved since surgery.    Principal Problem:    Brain mass, 3.3 cm left Frontal Parietal     Assessment: s/p left parietal craniotomy    Plan:   -Continue decadron and keppra  -Continue working with therapies, may require TCU placement at discharge    Funmilayo Hampton, DEB  [facility] Neurosurgery  [facility]  [address] [place] [address]  [address] [place] [address]    Tel [phone]  Pager [phone]      Interval History   Stable.    Physical Exam   Temp: 98  F (36.7  C) Temp src: Oral BP: 134/68 Pulse: 70   Resp: 18 SpO2: 95 % O2 Device: None (Room air)    Vitals:    02/22/21 0625 02/24/21 0100 02/26/21 0656   Weight: 152 lb (68.9 kg) 162 lb 14.7 oz (73.9 kg) 164 lb 1.6 oz (74.4 kg)     Vital Signs with Ranges  Temp:  [97.6  F (36.4  C)-98.4  F (36.9  C)] 98  F (36.7  C)  Pulse:  [52-70] 70  Resp:  [15-18] 18  BP: (133-161)/(68-92) 134/68  SpO2:  [94 %-97 %] 95 %  I/O last 3 completed shifts:  In: 400 [P.O.:300; I.V.:100]  Out: 1051 [Urine:1051]     , Blood pressure 134/68, pulse 70, temperature 98  F (36.7  C), temperature source Oral, resp. rate 18, height 5' 2.52\" (1.588 m), weight 164 lb 1.6 oz (74.4 kg), SpO2 95 %.  164 lbs 1.6 oz  HEENT:  Normocephalic.    Heart:  No peripheral edema  Lungs:  No SOB  Skin:  Warm and dry, good capillary " refill. Incision intact.  Extremities:  Good radial and dorsalis pedis pulses bilaterally, no edema, cyanosis or clubbing.    NEUROLOGICAL EXAMINATION:   Mental status:  Alert and follows commands.  Motor:  Strength is 5/5 throughout the upper and lower extremities except RUE slightly weaker.    Medications       acetaminophen  500 mg Oral Q6H     amLODIPine  10 mg Oral Daily     busPIRone HCl  30 mg Oral BID     dexamethasone  4 mg Intravenous Q6H     famotidine  20 mg Oral BID     FLUoxetine  80 mg Oral Daily     insulin aspart  1-10 Units Subcutaneous TID AC     insulin aspart  1-7 Units Subcutaneous At Bedtime     levETIRAcetam  500 mg Intravenous Q12H     sennosides  2 tablet Oral BID       Data     CBC RESULTS:   Recent Labs   Lab Test 02/18/21  0735   WBC 7.0   RBC 4.17   HGB 12.1   HCT 37.6   MCV 90   MCH 29.0   MCHC 32.2   RDW 13.5        Basic Metabolic Panel:  Lab Results   Component Value Date     02/18/2021      Lab Results   Component Value Date    POTASSIUM 4.2 02/18/2021     Lab Results   Component Value Date    CHLORIDE 106 02/18/2021     Lab Results   Component Value Date    ESTIVEN 9.4 02/18/2021     Lab Results   Component Value Date    CO2 28 02/18/2021     Lab Results   Component Value Date    BUN 19 02/18/2021     Lab Results   Component Value Date    CR 0.74 02/18/2021     Lab Results   Component Value Date     02/18/2021     INR:  Lab Results   Component Value Date    INR 1.03 02/18/2021    INR 0.98 02/17/2021

## 2021-02-26 NOTE — INTERIM SUMMARY
[facility] Pre-Admission Screen    Referral Source:  [facility] [address] [facility]  [address]  Admit date to referring facility: 2/17/2021    Physical Medicine and Rehab Consult Completed: No    Rehab Diagnosis:    Brain Dysfunction 02.1 Non-Traumatic: s/p left parietal craniotomy w/ tumor resection    Justification for Acute Inpatient Rehabilitation  Olga Bailey is a 75 year old right handed female w/ PMH of COPD, meningioma, depression, osteoarthritis, cervical myelopathy, and skin carcinoma, who presented to her PCP after noticing difficulty w/ writing, typing, and word finding. MRI revealed left lateral frontoparietal mass and associated vasogenic edema, as well as a right occipital lobe mass concerning for high-grade primary brain neoplasm. Due to these findings, she was admitted to [facility]. She is s/p L parietal craniotomy w/ tumor resection and excisional biopsy on 2/23/21. 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 NS clinic will contact patient with results when available and make referral to Oncology at that time.  Her hospital course is c/b acute anemia, mild steroid induced hyperglycemia, HTN, and leukocytosis. She is now medically stable and ready to discharge to acute inpatient rehab.   Patient requires an intensive inpatient rehab program to address the following acute impairments: impaired strength, impaired balance, impaired coordination, impaired judgement and safety awareness, impulsiveness, R neglect, dysphagia, mild apraxia, dysarthria, and receptive and expressive aphasia. These impairments are impacting her functional independence and safety w/ transfers, gait, stairs, ADLs, IADLs, communication, and safe swallowing.    Current Active Medical Management Needs/Risks for Clinical Complications  The patient requires the high level of rehabilitation physician supervision that accompanies the provision of  intensive rehabilitation therapy.  The patient needs the services of the rehabilitation physician to assess the patient medically and functionally and to modify the course of treatment as needed to maximize the patient's capacity to benefit from the rehabilitation process. Patient requires ongoing medical management and assessment of the following:    Neurological status: assess for changes with intervention as indicated as pt is at risk for seizures and brain edema; plan to continue keppra BID po    Steroid taper: decadron po taper over the next two weeks - 4mg TID and taper down to 2mg TID over 2 weeks until follow up (Decadron decreased to 4 mg every 8 hrs 2/28 and taper over 2 weeks to 2 mg every 8 hrs)    Craniotomy incision: assess for signs of infection/healing and staple removal 3/9/21    Hypertension: possibly due to steroids or underlying benign essential HTN; BP's frequently elevated this hospitalization. Started on amlodipine 2/21; 2/28 -  or so, if continues at or above this today will consider adding another agent.     Leukocytosis: assess for ongoing leukocytosis and signs of infection, suspect due to steroids and post op state    Depression/anxiety: may benefit from Health Psychology involvement due to new diagnosis; Bupropion held because of potential to lower seizure threshold. Started on PRN lorazepam this hospitalization. Continue buspirone, fluoxetine and PRN PO lorazepam 0.5 mg TID.    Non-severe malnutrition in setting of acute illness: assess intake/output - nutrition and speech following. Pt at risk for dehydration  Patient is at risk for falls with injury, brain edema, infection, and seizures.     Past Medical/Surgical History  Surgery in the past 100 days: Yes  Additional relevant past medical history: COPD, meningioma, depression, osteoarthritis, cervical myelopathy, skin carcinoma, dysthymia, asthma, colon polyps, sleep apnea, B bunionectomy, total disc arthroplasty of lumbar  spine, total abdominal hysterectomy, left rotator cuff repair, R ovarian mass removed (benign), left subtalar arthrodesis, glaucoma surgery, tonsillectomy, anxiety, basal cell carcinoma of face, cervical stenosis C4-C5, winged scapula    Level of Functioning Prior to Admission:    LIVING ENVIRONMENT  People in home: alone  Current Living Arrangements: house  Home Accessibility: stairs to enter home   Number of Stairs, Main Entrance: 3   Stair Railings, Main Entrance: railing on right side (ascending)   Number of Stairs, Within Home, Primary:  flight to basement laundry   Stair Railings, Within Home, Primary: railings safe and in good condition   Transportation Anticipated: family or friend will provide  Living Environment Comments: 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 Pt is also a retired Registered Nurse.     SELF-CARE  Usual Activity Tolerance: good  Regular Exercise: Yes   Activity/Exercise Type: walking   Exercise Amount/Frequency: greater than 1 hr, daily  Equipment Currently Used at Home: none  Activity/Exercise/Self-Care Comment: IND with all ADL's and mob    DISABILITY/FUNCTION  Concentrating, Remembering or Making Decisions Difficulty: no  Difficulty Communicating: yes  Difficulty Eating/Swallowing: no  Walking or Climbing Stairs Difficulty: no  Dressing/Bathing Difficulty: no  Toileting issues: no  Doing Errands Independently Difficulty (such as shopping): no  Fall history within last six months: yes; # of times patient has fallen w/in last six months: 1  Change in Functional Status Since Onset of Current Illness/Injury: yes    Level of Function: GG Scale (Section GG Functional Ability and Goals; CMS's NARVAEZ Version 3.0 Manual effective 10.1.2019):  PT Current Function Goals for Rehab   Bed Rolling 4 Supervision or touching assitance 6 Independent   Supine to Sit 4 Supervision or touching assitance 6 Independent   Sit to  Stand 1 Dependent 6 Independent   Transfer 3 Partial/moderate assistance 6 Independent   Ambulation 3 Partial/moderate assistance 6 Independent   Stairs 4 Supervision or touching assitance 6 Independent     OT Current Function Goals for Rehab   Feeding 5 Setup or clean-up assistance 6 Independent   Grooming 4 Supervision or touching assitance 6 Independent   Bathing Not completed 6 Independent   Upper Body Dressing 2 Substantial/maximal assistance 6 Independent   Lower Body Dressing 4 Supervision or touching assitance 6 Independent   Toileting Not completed 6 Independent   Toilet Transfer 3 Partial/moderate assistance 6 Independent   Tub/Shower Transfer Not completed 6 Independent   Cognition Impaired Supervision     SLP Current Function Goals for Rehab   Swallow Impaired Tolerate least restrictive diet without signs & symptoms of aspiration and adhere to safe swallow strategies   Communication Impaired Communicate basic needs effectively       Current Diet:  NDD3 and Thin liquids    Summary Statement:  Patient is participating in daily PT/OT/SLP and is making progress. Currently, pt with impulsivity, increased processing time and word finding difficulty at times, but appears to have good insight into deficits with cognition and R hand incoordination. Gait training up to 200ft with no AD and CGA/Gerard initially progressing to CGA. With the first 50 ft pt ambulated with short step length, NBOS and increased lateral path deviation. At times pt having LOB while attempting to avoid objects in the hallways and when attempting to talk to writer. Cues provided for increasing SABA/step length and for upward gaze. Pt attempting to stand prior to gait belt being donned and cues needed for safety. Pt transferred STSx2 from EOB with CGA, initially increased posterior lean and cues provided for weight shifting. Pt will need full cognitive-linguistic evaluation while on Acute Rehab.     Expected Therapies and Services Required  During Inpatient Rehab Admission  Intensity of Therapy: Patient requires intensive therapies not available in a lesser level of care. Patient is motivated, making gains, and can tolerate 3 hours of therapy a day.  Physical Therapy: 60 minutes per day, 7 days a week for 9 days  Occupational Therapy: 60 minutes per day, 7 days a week for 9 days  Speech and Language Therapy: 60 minutes per day, 7 days a week for 9 days  Rehabilitation Nursing Needs: Patient requires 24 hour Rehab Nursing to manage wound care, vitals, medication education, carryover of new rehab techniques, care coordination, skin integrity, assess neurologic status, pain management, provide safe environment for patient at falls risk and monitor nutritional intake.    Precautions/restrictions/special needs:   Precautions: fall precautions, Craniotomy precautions and aspiration precautions   Restrictions: none   Special Needs: Designated Visitor: patient's daughter, La Nena    Expected Level of Improvement: I with mobility and transfers and mod I with ADLs. Patient will need supervision due to expressive aphasia and impaired cognition.   Expected Length of time to achieve: 9 days    Anticipated Discharge Needs:  Anticipated Discharge Destination: Home  Anticipated Discharge Support: Family member  24/7 support available : Unknown - could live with daughter if needed.   Identified caregiver(s):  La Nena (daughter)  Anticipated Discharge Needs: Home with outpatient therapy    Identified challenges/barriers: None    Rehab Admission Liaison Signature:    Physician statement of review and agreement:  I have reviewed and am in agreement of the need for IRF stay to address above functional and medical needs. In addition to above statements address, Patient requires intensive active and ongoing therapeutic intervention and multiple therapies; Patient requires medical supervision; Expected to actively participate in the intensive rehab program; Sufficiently  stable to actively participate; Expectation for measurable improvement in functional capacity or adaption to impairments.    Physician Signature/Date/Time:

## 2021-02-26 NOTE — PROGRESS NOTES
02/26/21 0854   General Information   Onset of Illness/Injury or Date of Surgery 02/23/21   Referring Physician Dr. Bernabe   Patient/Family Therapy Goal Statement (SLP) Agreed to POC goal.  No additional goal was stated.   Pertinent History of Current Problem Per notes: 75 year old female with history including asthma, depression/anxiety and right occipital meningioma, who presented 2/17/2021 with recent headache, speech and reading problems and was found by MRI with a new enhancing left frontal-parietal mass concerning for primary brain neoplasm.  2/23: left parietal craniotomy for tumor resection.     General Observations Pt was alert and cooperative.  Occasional dysfluent speech/phonemic errors as well as semantic paraphasic errors at times.  Function is improving.   Western Aphasia Battery- Revised Bedside Record From   Spontaneous Speech Content Score (out of 10) 9   Spontaneous Speech Fluency Score (out of 10) 9   Auditory Verbal Comprehension Score (out of 10) 9   Sequential Commands Score (out of 10) 9   Repetition Score (out of 10) 10   Object Naming Score (out of 10) 9   Bedside Aphasia Sum 55   WAB-R Bedside Aphasia Score 91.67   Aphasia Severity Level Mild Aphasia   Comments Some decreased content and semantic paraphasic error x 1 during picture description.  Phonemic/dysfluent speech errors noted occasionally.   SLP Therapy Assessment/Plan   Criteria for Skilled Therapeutic Interventions Met (SLP Eval) yes   SLP Diagnosis Mild apraxia of speech and receptive/expressive aphasia   Rehab Potential (SLP Eval) good, to achieve stated therapy goals   Therapy Frequency (SLP Eval) daily   Predicted Duration of Therapy Intervention (SLP Eval) 1 week   Comment, Therapy Assessment/Plan (SLP) Pt presents with mild apraxia of speech and receptive/expressive aphasia per completed testing.  Speech-language improvement has been noted over the past few days.  Pt is very motivated to improve communication.  Will  assess reading and writing skills during the course of Tx.   Therapy Plan Review/Discharge Plan (SLP)   Therapy Plan Review (SLP) evaluation/treatment results reviewed;care plan/treatment goals reviewed;risks/benefits reviewed;current/potential barriers reviewed;participants voiced agreement with care plan;participants included;patient   SLP Discharge Planning    SLP Discharge Recommendation (DC Rec) Acute Rehab Center-Motivated patient will benefit from intensive, interdisciplinary therapy.  Anticipate will be able to tolerate 3 hours of therapy per day   SLP Rationale for DC Rec SLP Tx at [facility] to maximize swallow function for a least restrictive diet and communication for daily needs; pt below baseline   SLP Brief overview of current status  Improved oral coordination and swallowing timing observed.  Mild apraxia of speech and aphasia found during testing.  Rec: continued DDL2 and thin liquids with safe swallow strategies.    Total Evaluation Time   Total Evaluation Time (Minutes) 10

## 2021-02-26 NOTE — PROGRESS NOTES
[facility]    Medicine Progress Note - Hospitalist Service       Date of Admission:  2/17/2021  Assessment & Plan        75 year old female with history including asthma, depression/anxiety and right occipital meningioma, who presented 2/17/2021 with recent headache, speech and reading problems and was found by MRI with a new enhancing left frontal-parietal mass concerning for primary brain neoplasm.     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 MRI ordered in clinic 2/17 an enhancing 3.3 x 2.7 x 2.9 cm intra-axial mass in the lateral left frontoparietal region with signs of moderate amount of edema w/o midline shift, overall concerning for high grade primary brain neoplasm; unchanged small right occipital meningioma. Subsequently sent to [facility] and admitted.   * Started on IV dexamethasone. Neurosurgery consulted. CT CAP 2/18 did not show signs of a primary malignancy. MRI brain with contrast 2/19 showed a 3.3 x 2.8 x 2.8 cm enhancing mass in left frontal parietal region with dural base, could be intra-axial or extra-axial; 0.5 x 1.0 x 1.0 cm enhancing mass in right occipital lobe which was dural based, appeared to be extra-axial.  - APAP scheduled, after discussion with patient and her daughter  - await pathology, anticipate NS to consult Oncology at or before that point for ongoing planning and management  - keppra, decadron continued, goal sbp <150 per NS.  - 2/25 - 2/26 overall improvement reported today per patient and her daughter in room.  No new focal neurological deficits.  - defer to neurosurgery   - continue working with therapies -  ARU anticipated     Acute anemia, likely blood loss in post operative setting  Adm hgb 11.7; 10.3 on 2/24 and 9.9 on 2/25 --> 9/7 2/26 no  signs or reports of bleeding clinically.   - Continue to monitor clinically    Hyperglycemia, mild, secondary to steroids  Hgb A1C 5.4 2/18/2021. Sugars largely at goal.  - Continue ISS.     Hypertension, possibly due to steroids or underlying benign essential HTN  BP's frequently elevated this hospitalization. Started on amlodipine 2/21.  - Continue PRN IV hydralazine and PRN IV labetalol.  - 2/25 AM SBP up to 140s (still under threshold of 150), subsequently improved  - 2/26 AM , increased amlodipine to 10mg daily     Leukocytosis, suspect due to steroids and post op state  No clear signs of infection. WBC 12.8 on 2/20/21, normal on adm.  - Monitor clinically for signs of infection.  - WBC 19k on pod 1, afebrile. Periop abx per surgery --> wbc 14k 2/25  - Monitor CBC.     Depression/anxiety  Bupropion held because of potential to lower seizure threshold. Started on PRN lorazepam this hospitalization.  - Continue buspirone, fluoxetine.   - Continue PRN PO lorazepam 0.5 mg TID.     Asthma  Not on bronchodilators PTA.  - Monitor clinically. CTABL 2/23 - 2/25.     COVID-19 testing  2/17/21 PCR negative, 8/8/20 PCR negative      Diet: Snacks/Supplements Adult: Boost Plus; Between Meals  Dysphagia Diet Level 2 Mech Altered Thin Liquids (water, ice chips, juice, milk gelatin, ice cream, etc); No Straws    DVT Prophylaxis: Pneumatic Compression Devices  Martino Catheter: not present  Code Status: Full Code           Disposition Plan   Expected discharge: pending ARU acceptance and NS clearance  Entered: Duke Bernabe MD 02/26/2021, 8:22 AM       The patient's care was discussed with the Bedside Nurse, Patient and Patient's Family.    Duke Bernabe MD  Hospitalist Service  [facility]  Contact information available via [facility] Paging/Directory    ______________________________________________________________________    Interval History   No acute events overnight or this morning.   Overall stable/slightly improved clinically.  MRI completed yesterday.  Pathology awaited.  Denies headache, vision changes, fevers, chills, shortness of breath or chest pain.  Still with some word finding difficulty at times.  Daughter at bedside.    Data reviewed today: I reviewed all medications, new labs and imaging results over the last 24 hours. I personally reviewed no images or EKG's today.    Physical Exam   Vital Signs: Temp: 98  F (36.7  C) Temp src: Oral BP: (!) 161/92 Pulse: 70   Resp: 18 SpO2: 95 % O2 Device: None (Room air)    Weight: 164 lbs 1.6 oz    Gen: NAD, pleasant, daughter at bedside  HEENT: Normocephalic, EOMI, MMM  Resp: no crackles,  no wheezes, no increased work of resp  CV: S1S2 heard, reg rhythm, reg rate, no pedal edema  Abdo: soft, nontender, nondistended, bowel sounds present  Ext: calves nontender, well perfused  Neuro: AAOx3, CN grossly intact, no facial asymmetry, moving all 4 extremities - strength intact besides very slightly weaker in R arm/hand compared to left (not new)    Data   Recent Labs   Lab 02/26/21  0745 02/25/21  0750 02/24/21  0703 02/22/21  0859 02/20/21  0929   WBC 14.3* 14.4* 19.9* 10.9 12.8*   HGB 9.7* 9.9* 10.3* 11.7 12.2   MCV 89 89 90 89 91    298 312 326 357   INR  --   --   --  1.02  --    NA  --   --  135 136 136   POTASSIUM  --   --  4.3 3.9 4.3   CHLORIDE  --   --  102 105 106   CO2  --   --  27 27 26   BUN  --   --  28 28 26   CR  --   --  0.64 0.77 0.77   ANIONGAP  --   --  6 4 4   ESTIVEN  --   --  8.5 9.1 9.7   GLC  --   --  110* 100* 127*     Recent Results (from the past 24 hour(s))   MR Brain w/o & w Contrast    Narrative    MRI OF THE BRAIN WITHOUT AND WITH CONTRAST 2/25/2021 6:33 PM     COMPARISON: Head CT 2/24/2021, brain MR 2/19/2021.    HISTORY:  Brain mass or lesion.    TECHNIQUE: Axial diffusion-weighted with ADC map, axial T2-weighted  with fat saturation, axial T1-weighted, axial turboFLAIR and coronal  T1-weighted images of the brain  were acquired without intravenous  contrast.  Following intravenous administration of gadolinium (7 mL  Gadavist ), axial T1-weighted images of the brain were acquired.     FINDINGS: Recent postoperative change consisting of a small right  parietal craniotomy and surgical resection cavity at the  posterolateral aspect of the left frontal lobe again noted. Mixed  signal intensity fluid within the surgical resection cavity likely  representing a small amount of CSF and a small amount of hemorrhage  again noted. The surgical resection cavity appears more distended than  on the comparison study, now measuring 3.5 x 2.9 x 2.9 cm in the  greatest AP, transverse and cephalocaudal dimensions respectively  compared to approximately 2.7 x 1.6 cm in the AP and transverse  dimensions respectively on the comparison study. This increase in size  of the surgical resection cavity could be due to slight interval  increase in hemorrhage. Continued surveillance recommended. Minimal  patchy abnormal contrast enhancement at the peripheral margins of the  surgical resection cavity is noted most likely representing  postoperative change, but residual enhancing tumor cannot be  completely excluded. Moderate vasogenic edema in the posterior aspect  of the left cerebral hemisphere surrounding the surgical resection  cavity is again noted without appreciable change. Rightward midline  shift of the septum pellucidum measuring 2-3 mm is again noted without  appreciable change.    There is moderate diffuse cerebral volume loss. There are numerous  scattered focal and patchy periventricular areas of abnormal T2 signal  hyperintensity in the cerebral white matter bilaterally that are  consistent with sequela of chronic small vessel ischemic disease. The  ventricles and basal cisterns are within normal limits in  configuration given the degree of cerebral volume loss. There is no  evidence for stroke or acute intracranial hemorrhage.    There is no  sinusitis or mastoiditis.      Impression    IMPRESSION:   1. Postoperative change consisting of a small right parietal  craniotomy and small surgical resection cavity in the posterolateral  aspect of the left frontal lobe again noted.  2. Slight interval increase in distention of the surgical resection  cavity possibly due to a small amount of interval hemorrhage.  Continued surveillance recommended.  3. Minimal patchy abnormal contrast enhancement at the margins of the  surgical resection cavity most likely represents postoperative change,  but residual tumor cannot be completely excluded. Continued  surveillance recommended.  4. 2-3 mm rightward midline shift of the septum pellucidum is stable  from the comparison study.  5. Diffuse cerebral volume loss and cerebral white matter changes  consistent with chronic small vessel ischemic disease.    OWEN MARTIN MD

## 2021-02-27 ENCOUNTER — APPOINTMENT (OUTPATIENT)
Dept: SPEECH THERAPY | Facility: CLINIC | Age: 76
DRG: 025 | End: 2021-02-27
Payer: MEDICARE

## 2021-02-27 ENCOUNTER — APPOINTMENT (OUTPATIENT)
Dept: PHYSICAL THERAPY | Facility: CLINIC | Age: 76
DRG: 025 | End: 2021-02-27
Payer: MEDICARE

## 2021-02-27 ENCOUNTER — APPOINTMENT (OUTPATIENT)
Dept: OCCUPATIONAL THERAPY | Facility: CLINIC | Age: 76
DRG: 025 | End: 2021-02-27
Payer: MEDICARE

## 2021-02-27 LAB
GLUCOSE BLDC GLUCOMTR-MCNC: 107 MG/DL (ref 70–99)
GLUCOSE BLDC GLUCOMTR-MCNC: 112 MG/DL (ref 70–99)
GLUCOSE BLDC GLUCOMTR-MCNC: 112 MG/DL (ref 70–99)
GLUCOSE BLDC GLUCOMTR-MCNC: 123 MG/DL (ref 70–99)
GLUCOSE BLDC GLUCOMTR-MCNC: 127 MG/DL (ref 70–99)
GLUCOSE BLDC GLUCOMTR-MCNC: 134 MG/DL (ref 70–99)

## 2021-02-27 PROCEDURE — 92507 TX SP LANG VOICE COMM INDIV: CPT | Mod: GN

## 2021-02-27 PROCEDURE — 999N001017 HC STATISTIC GLUCOSE BY METER IP

## 2021-02-27 PROCEDURE — 250N000013 HC RX MED GY IP 250 OP 250 PS 637: Performed by: HOSPITALIST

## 2021-02-27 PROCEDURE — 120N000001 HC R&B MED SURG/OB

## 2021-02-27 PROCEDURE — 250N000013 HC RX MED GY IP 250 OP 250 PS 637: Performed by: PHYSICIAN ASSISTANT

## 2021-02-27 PROCEDURE — 97112 NEUROMUSCULAR REEDUCATION: CPT | Mod: GP

## 2021-02-27 PROCEDURE — 250N000012 HC RX MED GY IP 250 OP 636 PS 637: Performed by: PHYSICIAN ASSISTANT

## 2021-02-27 PROCEDURE — 97535 SELF CARE MNGMENT TRAINING: CPT | Mod: GO | Performed by: OCCUPATIONAL THERAPIST

## 2021-02-27 PROCEDURE — 250N000013 HC RX MED GY IP 250 OP 250 PS 637: Performed by: NURSE PRACTITIONER

## 2021-02-27 PROCEDURE — 250N000011 HC RX IP 250 OP 636: Performed by: HOSPITALIST

## 2021-02-27 PROCEDURE — 99232 SBSQ HOSP IP/OBS MODERATE 35: CPT | Performed by: HOSPITALIST

## 2021-02-27 RX ORDER — DEXAMETHASONE 4 MG/1
4 TABLET ORAL EVERY 6 HOURS SCHEDULED
Status: COMPLETED | OUTPATIENT
Start: 2021-02-27 | End: 2021-02-28

## 2021-02-27 RX ORDER — AMOXICILLIN 250 MG
1 CAPSULE ORAL
Status: DISCONTINUED | OUTPATIENT
Start: 2021-02-27 | End: 2021-03-01 | Stop reason: HOSPADM

## 2021-02-27 RX ORDER — ACETAMINOPHEN 500 MG
500 TABLET ORAL EVERY 4 HOURS PRN
Status: DISCONTINUED | OUTPATIENT
Start: 2021-02-27 | End: 2021-03-01 | Stop reason: HOSPADM

## 2021-02-27 RX ORDER — DEXAMETHASONE 4 MG/1
4 TABLET ORAL EVERY 8 HOURS SCHEDULED
Status: DISCONTINUED | OUTPATIENT
Start: 2021-02-28 | End: 2021-03-01 | Stop reason: HOSPADM

## 2021-02-27 RX ORDER — DOCUSATE SODIUM 100 MG/1
100 CAPSULE, LIQUID FILLED ORAL 2 TIMES DAILY PRN
Status: DISCONTINUED | OUTPATIENT
Start: 2021-02-27 | End: 2021-03-01 | Stop reason: HOSPADM

## 2021-02-27 RX ADMIN — FLUOXETINE 80 MG: 20 CAPSULE ORAL at 09:16

## 2021-02-27 RX ADMIN — ACETAMINOPHEN 500 MG: 500 TABLET, FILM COATED ORAL at 20:19

## 2021-02-27 RX ADMIN — ACETAMINOPHEN 500 MG: 500 TABLET, FILM COATED ORAL at 05:41

## 2021-02-27 RX ADMIN — LEVETIRACETAM 500 MG: 500 TABLET, FILM COATED ORAL at 09:16

## 2021-02-27 RX ADMIN — DEXAMETHASONE SODIUM PHOSPHATE 4 MG: 4 INJECTION, SOLUTION INTRA-ARTICULAR; INTRALESIONAL; INTRAMUSCULAR; INTRAVENOUS; SOFT TISSUE at 00:22

## 2021-02-27 RX ADMIN — LORAZEPAM 0.5 MG: 0.5 TABLET ORAL at 20:19

## 2021-02-27 RX ADMIN — FAMOTIDINE 20 MG: 20 TABLET ORAL at 09:17

## 2021-02-27 RX ADMIN — DEXAMETHASONE 4 MG: 4 TABLET ORAL at 13:32

## 2021-02-27 RX ADMIN — DEXAMETHASONE SODIUM PHOSPHATE 4 MG: 4 INJECTION, SOLUTION INTRA-ARTICULAR; INTRALESIONAL; INTRAMUSCULAR; INTRAVENOUS; SOFT TISSUE at 05:41

## 2021-02-27 RX ADMIN — BUSPIRONE HYDROCHLORIDE 30 MG: 15 TABLET ORAL at 20:19

## 2021-02-27 RX ADMIN — LEVETIRACETAM 500 MG: 500 TABLET, FILM COATED ORAL at 20:19

## 2021-02-27 RX ADMIN — DEXAMETHASONE 4 MG: 4 TABLET ORAL at 18:01

## 2021-02-27 RX ADMIN — BUSPIRONE HYDROCHLORIDE 30 MG: 15 TABLET ORAL at 09:17

## 2021-02-27 RX ADMIN — FAMOTIDINE 20 MG: 20 TABLET ORAL at 20:19

## 2021-02-27 RX ADMIN — ACETAMINOPHEN 500 MG: 500 TABLET, FILM COATED ORAL at 00:21

## 2021-02-27 RX ADMIN — AMLODIPINE BESYLATE 10 MG: 10 TABLET ORAL at 09:16

## 2021-02-27 ASSESSMENT — ACTIVITIES OF DAILY LIVING (ADL)
ADLS_ACUITY_SCORE: 19

## 2021-02-27 NOTE — PLAN OF CARE
VSS. Denies pain. Worked with therapy today and did great. SBA w/ gait belt. Pt has no complaints. Waiting for ARU placement. Continue to monitor.

## 2021-02-27 NOTE — PROGRESS NOTES
[facility]    Medicine Progress Note - Hospitalist Service       Date of Admission:  2/17/2021  Assessment & Plan        75 year old female with history including asthma, depression/anxiety and right occipital meningioma, who presented 2/17/2021 with recent headache, speech and reading problems and was found by MRI with a new enhancing left frontal-parietal mass concerning for primary brain neoplasm.     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 MRI ordered in clinic 2/17 an enhancing 3.3 x 2.7 x 2.9 cm intra-axial mass in the lateral left frontoparietal region with signs of moderate amount of edema w/o midline shift, overall concerning for high grade primary brain neoplasm; unchanged small right occipital meningioma. Subsequently sent to [facility] and admitted.   * Started on IV dexamethasone. Neurosurgery consulted. CT CAP 2/18 did not show signs of a primary malignancy. MRI brain with contrast 2/19 showed a 3.3 x 2.8 x 2.8 cm enhancing mass in left frontal parietal region with dural base, could be intra-axial or extra-axial; 0.5 x 1.0 x 1.0 cm enhancing mass in right occipital lobe which was dural based, appeared to be extra-axial.  - APAP scheduled, after discussion with patient and her daughter  - await pathology, anticipate NS to consult Oncology at or before that point for ongoing planning and management  - keppra, decadron now PO  - 2/25 - 2/27 overall improvement reported today per patient and her daughter in room.  No new focal neurological deficits.  - continue working with therapies -  ARU anticipated, likely Monday or Tuesday     Acute anemia, likely blood loss in post operative setting  Adm hgb 11.7; 10.3 on 2/24 and 9.9 on 2/25 --> 9/7 2/26 no signs or reports of bleeding  clinically.   - Continue to monitor clinically / as indicated    Hyperglycemia, mild, secondary to steroids  Hgb A1C 5.4 2/18/2021. Sugars remain largely at goal.  - Continue ISS.     Hypertension, possibly due to steroids or underlying benign essential HTN  BP's frequently elevated this hospitalization. Started on amlodipine 2/21.  - Continue PRN IV hydralazine and PRN IV labetalol.  - 2/26 AM , increased amlodipine to 10mg daily  - 2/27 - BP improved, will give new dose a couple days to have full affect     Leukocytosis, suspect due to steroids and post op state  No clear signs of infection. WBC 12.8 on 2/20/21, normal on adm.  - Monitor clinically for signs of infection.  - WBC 19k on pod 1, afebrile. Periop abx per surgery --> wbc 14k 2/25  - Monitor CBC.     Depression/anxiety  Bupropion held because of potential to lower seizure threshold. Started on PRN lorazepam this hospitalization.  - Continue buspirone, fluoxetine.   - Continue PRN PO lorazepam 0.5 mg TID.     Asthma  Not on bronchodilators PTA.  - Monitor clinically. CTABL 2/23 - 2/27.     Nonsevere malnutrition in setting of acute illness  Nutrition following, appreciated  - speech following, defer diet to them  - encourage PO intake and supplement as needed    COVID-19 testing  2/17/21 PCR negative, 8/8/20 PCR negative      Diet: Snacks/Supplements Adult: Boost Plus; Between Meals  Dysphagia Diet Level 2 [facility] Altered Thin Liquids (water, ice chips, juice, milk gelatin, ice cream, etc); No Straws  Room Service    DVT Prophylaxis: Pneumatic Compression Devices  Martino Catheter: not present  Code Status: Full Code           Disposition Plan   Expected discharge: ARU Monday or Tuesday likely  Entered: Duke Bernabe MD 02/27/2021, 3:58 PM       The patient's care was discussed with the Bedside Nurse, Patient and Patient's Family.    Duke Bernabe MD  Hospitalist Service  [facility]  Contact information available via  [facility] Paging/Directory    ______________________________________________________________________    Interval History   No acute events.  Patient seen and examined.  Daughter at bedside near the end of my encounter today.  Speech working with patient upon my arrival.  No new complaints-denies headache, vision changes, worsening of expressive aphasia, or tingling numbness or weakness.  Denies shortness of breath, fevers, chills, or chest pain.  Continuing to work with therapy.  ARU anticipated in the next few days.    Data reviewed today: I reviewed all medications, new labs and imaging results over the last 24 hours. I personally reviewed no images or EKG's today.    Physical Exam   Vital Signs: Temp: 98.3  F (36.8  C) Temp src: Oral BP: (!) 146/82 Pulse: 66   Resp: 16 SpO2: 98 % O2 Device: None (Room air)    Weight: 164 lbs 1.6 oz    Gen: NAD, pleasant as usual  HEENT: Normocephalic, EOMI, MMM  Resp: no crackles,  no wheezes, no increased work of resp  CV: S1S2 heard, reg rhythm, reg rate, no pedal edema  Abdo: soft, nontender, nondistended, bowel sounds present  Ext: calves nontender, well perfused  Neuro: AAOx3, CN grossly intact, no facial asymmetry, moving all 4 extremities - strength intact besides very slightly weaker in R arm/hand compared to left (not new)       Data   Recent Labs   Lab 02/26/21  0745 02/25/21  0750 02/24/21  0703 02/22/21  0859   WBC 14.3* 14.4* 19.9* 10.9   HGB 9.7* 9.9* 10.3* 11.7   MCV 89 89 90 89    298 312 326   INR  --   --   --  1.02   NA  --   --  135 136   POTASSIUM  --   --  4.3 3.9   CHLORIDE  --   --  102 105   CO2  --   --  27 27   BUN  --   --  28 28   CR  --   --  0.64 0.77   ANIONGAP  --   --  6 4   ESTIVEN  --   --  8.5 9.1   GLC  --   --  110* 100*     No results found for this or any previous visit (from the past 24 hour(s)).

## 2021-02-27 NOTE — PROGRESS NOTES
POD#4 s/p left parietal craniotomy for excisional biopsy of parietal tumor with final pathology pending but frozen revealing probable high grade glioma.    Patient is doing well today, denies headache or pain.  She believes her speech is getting better every day and notes significant improvement in right hand apraxia.    VSS, moving all four extremities well, intermittent word finding noted.  Incision C/D/I, neurologically intact.    PLAN:  Decadron changed to oral today and will plan for taper.  Continue PT/OT/Speech.  Okay to discharge to ARU when bed available.  Discussed with , no beds available today.  Follow up in NS clinic 3/9/21 for staple removal.    JOSE Cobb  [facility]  [address] [address]    Tel [phone]  Pager [phone]

## 2021-02-27 NOTE — PROGRESS NOTES
Lamar Balance Scale (BBS) Cutoff Scores: A score of < 45/56 indicates an increased risk for falls.   Patient Score: 45/56    The BBS is a measure of static and dynamic standing balance that has been validated in community dwelling elderly individuals and individuals who have Parkinson's Disease, MS, and those who are s/p CVA and TBI. The test is administered without an assistive device. Scores from the Lamar are used to determine the probability of falling based on the patient's previous history of falls and their test performance.     Minimal Detectable Change = 6.5   & Minimal Detectable Change (Parkinson's Disease) = 5 according to Keven & Krzysztof 2008    Assessment (rationale for performing, application to patient s function & care plan): To further assess balance.  Minutes billed as physical performance test: 13

## 2021-02-27 NOTE — PLAN OF CARE
PT POD 3 crani. Alert and disorient to year. PT has some word finding difficulty. Otherwise neuros intact. Incision YOGI with staples, no drainage. Denies pain. PT up with A1/GB/W. PT having some loose stool this evening, senna held.     PT greenlight on stoplight aggression tool.   Pt belongings at bedside.

## 2021-02-27 NOTE — PLAN OF CARE
POD 4 Crani for left brain mass.  Neuros stable. Forget full at times , Word finding difficulty.  Left head incision CDI with staples, open to air. Denies pain, on scheduled tylenol.VSS  Blood pressures within parameters.On DD2 thin liquid diet.Up with one ,walker and GB Voided adequately. Plan for discharge to ARU when ready.

## 2021-02-28 ENCOUNTER — APPOINTMENT (OUTPATIENT)
Dept: SPEECH THERAPY | Facility: CLINIC | Age: 76
DRG: 025 | End: 2021-02-28
Payer: MEDICARE

## 2021-02-28 ENCOUNTER — APPOINTMENT (OUTPATIENT)
Dept: PHYSICAL THERAPY | Facility: CLINIC | Age: 76
DRG: 025 | End: 2021-02-28
Payer: MEDICARE

## 2021-02-28 LAB
BASOPHILS # BLD AUTO: 0 10E9/L (ref 0–0.2)
BASOPHILS NFR BLD AUTO: 0.2 %
DIFFERENTIAL METHOD BLD: ABNORMAL
EOSINOPHIL # BLD AUTO: 0 10E9/L (ref 0–0.7)
EOSINOPHIL NFR BLD AUTO: 0 %
ERYTHROCYTE [DISTWIDTH] IN BLOOD BY AUTOMATED COUNT: 13.5 % (ref 10–15)
GLUCOSE BLDC GLUCOMTR-MCNC: 113 MG/DL (ref 70–99)
GLUCOSE BLDC GLUCOMTR-MCNC: 118 MG/DL (ref 70–99)
GLUCOSE BLDC GLUCOMTR-MCNC: 119 MG/DL (ref 70–99)
GLUCOSE BLDC GLUCOMTR-MCNC: 124 MG/DL (ref 70–99)
GLUCOSE BLDC GLUCOMTR-MCNC: 134 MG/DL (ref 70–99)
HCT VFR BLD AUTO: 30.8 % (ref 35–47)
HGB BLD-MCNC: 9.9 G/DL (ref 11.7–15.7)
IMM GRANULOCYTES # BLD: 0.7 10E9/L (ref 0–0.4)
IMM GRANULOCYTES NFR BLD: 4.6 %
LYMPHOCYTES # BLD AUTO: 0.7 10E9/L (ref 0.8–5.3)
LYMPHOCYTES NFR BLD AUTO: 4.4 %
MCH RBC QN AUTO: 28.5 PG (ref 26.5–33)
MCHC RBC AUTO-ENTMCNC: 32.1 G/DL (ref 31.5–36.5)
MCV RBC AUTO: 89 FL (ref 78–100)
MONOCYTES # BLD AUTO: 0.8 10E9/L (ref 0–1.3)
MONOCYTES NFR BLD AUTO: 5.3 %
NEUTROPHILS # BLD AUTO: 13.3 10E9/L (ref 1.6–8.3)
NEUTROPHILS NFR BLD AUTO: 85.5 %
NRBC # BLD AUTO: 0 10*3/UL
NRBC BLD AUTO-RTO: 0 /100
PLATELET # BLD AUTO: 338 10E9/L (ref 150–450)
RBC # BLD AUTO: 3.47 10E12/L (ref 3.8–5.2)
WBC # BLD AUTO: 15.6 10E9/L (ref 4–11)

## 2021-02-28 PROCEDURE — 85025 COMPLETE CBC W/AUTO DIFF WBC: CPT | Performed by: HOSPITALIST

## 2021-02-28 PROCEDURE — 250N000013 HC RX MED GY IP 250 OP 250 PS 637: Performed by: HOSPITALIST

## 2021-02-28 PROCEDURE — 36415 COLL VENOUS BLD VENIPUNCTURE: CPT | Performed by: HOSPITALIST

## 2021-02-28 PROCEDURE — 999N001017 HC STATISTIC GLUCOSE BY METER IP

## 2021-02-28 PROCEDURE — 120N000001 HC R&B MED SURG/OB

## 2021-02-28 PROCEDURE — 250N000013 HC RX MED GY IP 250 OP 250 PS 637: Performed by: PHYSICIAN ASSISTANT

## 2021-02-28 PROCEDURE — 97112 NEUROMUSCULAR REEDUCATION: CPT | Mod: GP

## 2021-02-28 PROCEDURE — 92507 TX SP LANG VOICE COMM INDIV: CPT | Mod: GN

## 2021-02-28 PROCEDURE — 250N000012 HC RX MED GY IP 250 OP 636 PS 637: Performed by: PHYSICIAN ASSISTANT

## 2021-02-28 PROCEDURE — 99232 SBSQ HOSP IP/OBS MODERATE 35: CPT | Performed by: HOSPITALIST

## 2021-02-28 PROCEDURE — 97530 THERAPEUTIC ACTIVITIES: CPT | Mod: GP

## 2021-02-28 PROCEDURE — 97116 GAIT TRAINING THERAPY: CPT | Mod: GP

## 2021-02-28 PROCEDURE — 92526 ORAL FUNCTION THERAPY: CPT | Mod: GN

## 2021-02-28 PROCEDURE — 250N000013 HC RX MED GY IP 250 OP 250 PS 637: Performed by: NURSE PRACTITIONER

## 2021-02-28 RX ADMIN — DEXAMETHASONE 4 MG: 4 TABLET ORAL at 21:44

## 2021-02-28 RX ADMIN — AMLODIPINE BESYLATE 10 MG: 10 TABLET ORAL at 08:00

## 2021-02-28 RX ADMIN — STANDARDIZED SENNA CONCENTRATE 1 TABLET: 8.6 TABLET ORAL at 21:43

## 2021-02-28 RX ADMIN — FAMOTIDINE 20 MG: 20 TABLET ORAL at 21:44

## 2021-02-28 RX ADMIN — DEXAMETHASONE 4 MG: 4 TABLET ORAL at 00:33

## 2021-02-28 RX ADMIN — STANDARDIZED SENNA CONCENTRATE 1 TABLET: 8.6 TABLET ORAL at 08:24

## 2021-02-28 RX ADMIN — BUSPIRONE HYDROCHLORIDE 30 MG: 15 TABLET ORAL at 08:00

## 2021-02-28 RX ADMIN — LEVETIRACETAM 500 MG: 500 TABLET, FILM COATED ORAL at 08:00

## 2021-02-28 RX ADMIN — ACETAMINOPHEN 500 MG: 500 TABLET, FILM COATED ORAL at 21:44

## 2021-02-28 RX ADMIN — DEXAMETHASONE 4 MG: 4 TABLET ORAL at 05:49

## 2021-02-28 RX ADMIN — LEVETIRACETAM 500 MG: 500 TABLET, FILM COATED ORAL at 21:43

## 2021-02-28 RX ADMIN — BUSPIRONE HYDROCHLORIDE 30 MG: 15 TABLET ORAL at 21:43

## 2021-02-28 RX ADMIN — FLUOXETINE 80 MG: 20 CAPSULE ORAL at 08:00

## 2021-02-28 RX ADMIN — FAMOTIDINE 20 MG: 20 TABLET ORAL at 08:24

## 2021-02-28 RX ADMIN — LORAZEPAM 0.5 MG: 0.5 TABLET ORAL at 21:44

## 2021-02-28 RX ADMIN — DEXAMETHASONE 4 MG: 4 TABLET ORAL at 13:40

## 2021-02-28 ASSESSMENT — ACTIVITIES OF DAILY LIVING (ADL)
ADLS_ACUITY_SCORE: 19

## 2021-02-28 NOTE — PROGRESS NOTES
[facility]    Medicine Progress Note - Hospitalist Service       Date of Admission:  2/17/2021  Assessment & Plan       75 year old female with history including asthma, depression/anxiety and right occipital meningioma, who presented 2/17/2021 with recent headache, speech and reading problems and was found by MRI with a new enhancing left frontal-parietal mass concerning for primary brain neoplasm.     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 MRI ordered in clinic 2/17 an enhancing 3.3 x 2.7 x 2.9 cm intra-axial mass in the lateral left frontoparietal region with signs of moderate amount of edema w/o midline shift, overall concerning for high grade primary brain neoplasm; unchanged small right occipital meningioma. Subsequently sent to [facility] and admitted.   * Started on IV dexamethasone. Neurosurgery consulted. CT CAP 2/18 did not show signs of a primary malignancy. MRI brain with contrast 2/19 showed a 3.3 x 2.8 x 2.8 cm enhancing mass in left frontal parietal region with dural base, could be intra-axial or extra-axial; 0.5 x 1.0 x 1.0 cm enhancing mass in right occipital lobe which was dural based, appeared to be extra-axial.  - APAP scheduled, after discussion with patient and her daughter  - await pathology  - keppra bid po continued - defer regimen/duration to NS  - decadron PO taper over next 2 weeks per NS  - NS ok with discharge to ARU - NS f/u 3/9 with staple removal  - 2/25 - 2/28 continued overall improvement reported per patient and her daughter in room.  No new focal neurological deficits.  - continue working with therapies -  ARU anticipated, likely Monday      Acute anemia, likely blood loss in post operative setting  Adm hgb 11.7; 10.3 on 2/24 and 9.9 on 2/25 --> 9.7  2/26 --> 9.9 2/28. No signs or reports of bleeding clinically.   - Continue to monitor clinically / as indicated    Hyperglycemia, mild, secondary to steroids  Hgb A1C 5.4 2/18/2021. Sugars remain largely at goal.  - Continue ISS.     Hypertension, possibly due to steroids or underlying benign essential HTN  BP's frequently elevated this hospitalization. Started on amlodipine 2/21.  - Continue PRN IV hydralazine and PRN IV labetalol.  - 2/26 AM , increased amlodipine to 10mg daily  - 2/27 - BP improved, will give new dose a couple days to have full affect  - 2/28 -  or so, if continues at or above this today will consider adding another agent      Leukocytosis, suspect due to steroids and post op state  No clear signs of infection. WBC 12.8 on 2/20/21, normal on adm.  - Monitor clinically for signs of infection.  - WBC 19k on pod 1, afebrile. Periop abx per surgery --> wbc 14k 2/25 --> 15k 2/28 afebrile  - Monitor CBC.     Depression/anxiety  Bupropion held because of potential to lower seizure threshold. Started on PRN lorazepam this hospitalization.  - Continue buspirone, fluoxetine.   - Continue PRN PO lorazepam 0.5 mg TID.     Asthma  Not on bronchodilators PTA.  - Monitor clinically. CTABL and on RA 2/23 - 2/28.     Nonsevere malnutrition in setting of acute illness  Nutrition following, appreciated  - speech following, defer diet to them  - encourage PO intake and supplement as needed    COVID-19 testing  2/17/21 PCR negative, 8/8/20 PCR negative      Diet: Snacks/Supplements Adult: Boost Plus; Between Meals  Dysphagia Diet Level 2 [facility] Altered Thin Liquids (water, ice chips, juice, milk gelatin, ice cream, etc); No Straws  Room Service    DVT Prophylaxis: Pneumatic Compression Devices  Martino Catheter: not present  Code Status: Full Code           Disposition Plan   Expected discharge: sounds like ARU planned for 3/1  Entered: Duke Bernabe MD 02/28/2021, 10:20 AM       The patient's care was  discussed with the Bedside Nurse and Patient.    Duke Bernabe MD  Hospitalist Service  [facility]  Contact information available via [facility] Paging/Directory    ______________________________________________________________________    Interval History   No acute events.  Patient seen and examined.  Continues to be stable/slightly improved over the last few days.  No new neuro deficits.  Denies headache, vision changes, new tingling numbness or weakness.  Denies shortness of breath fevers chills or chest pain.  Likely ARU tomorrow.    Data reviewed today: I reviewed all medications, new labs and imaging results over the last 24 hours. I personally reviewed no images or EKG's today.    Physical Exam   Vital Signs: Temp: 97.9  F (36.6  C) Temp src: Oral BP: (!) 144/83 Pulse: 64   Resp: 16 SpO2: 97 % O2 Device: None (Room air)    Weight: 164 lbs 1.6 oz    Gen: NAD, pleasant as usual  HEENT: Normocephalic, EOMI, MMM  Resp: no crackles,  no wheezes, no increased work of resp  CV: S1S2 heard, reg rhythm, reg rate, no pedal edema  Abdo: soft, nontender, nondistended, bowel sounds present  Ext: calves nontender, well perfused  Neuro: AAOx3, CN grossly intact, no facial asymmetry, moving all 4 extremities - strength intact and nearly symmetrical in upper/lower ext, RUE had been slightly weaker before      Data   Recent Labs   Lab 02/28/21  0802 02/26/21  0745 02/25/21  0750 02/24/21  0703 02/22/21  0859   WBC 15.6* 14.3* 14.4* 19.9* 10.9   HGB 9.9* 9.7* 9.9* 10.3* 11.7   MCV 89 89 89 90 89    302 298 312 326   INR  --   --   --   --  1.02   NA  --   --   --  135 136   POTASSIUM  --   --   --  4.3 3.9   CHLORIDE  --   --   --  102 105   CO2  --   --   --  27 27   BUN  --   --   --  28 28   CR  --   --   --  0.64 0.77   ANIONGAP  --   --   --  6 4   ESTIVEN  --   --   --  8.5 9.1   GLC  --   --   --  110* 100*     No results found for this or any previous visit (from the past 24 hour(s)).

## 2021-02-28 NOTE — PLAN OF CARE
SLP: Pt continues to consume at least 80% of her meals 3x/day. Upgraded to dysphagia diet level 3. Pt not drinking nutritional supplements between meals as she is full. Nutrition/MD to determine if still necessary.

## 2021-02-28 NOTE — PLAN OF CARE
PT here POD 4 crani for left brain mass. Neuros stable. Alert, disoriented to year. Some word finding difficulty. Left head incision CDI with staples, and open to air. DD2 with thin liquids. Denies pain. Pt had small nose bleed today, resolved, no further bleeding. Plan to discharge to ARU when bed available, plan for Monday.     Pt greenlight on stoplight aggression tool.   Pt belongings at bedside.

## 2021-02-28 NOTE — PROGRESS NOTES
POD#5 s/p left parietal craniotomy for excisional biopsy of parietal tumor with final pathology pending but frozen revealing probable high grade glioma.     Patient is doing well today, denies headache or pain.  She believes her speech is getting better every day and notes significant improvement in right hand apraxia.     VSS, moving all four extremities well, intermittent word finding noted.  Incision C/D/I, neurologically intact.     PLAN:  Decadron decreased to 4 mg every 8 hrs today and will taper over 2 weeks to 2 mg every 8 hrs.  Pathology pending, okay to discharge from NS standpoint when bed available.  NS office will contact patient with biopsy results and make referral to Oncology at that time.   Continue PT/OT/Speech.  Okay to discharge to ARU when bed available.  Discussed with , no beds available today.  Follow up in NS clinic 3/9/21 for staple removal.     Discussed with Dr. Cummings.    JOSE Cobb  [facility] Neurosurgery  [address] [address]     Tel [phone]  Pager [phone]

## 2021-02-28 NOTE — PROGRESS NOTES
Care Management Follow Up    Length of Stay (days): 11    Expected Discharge Date: 03/01/21  Expected Time of Departure: 12  Concerns to be Addressed: discharge planning     Patient plan of care discussed at interdisciplinary rounds: Yes    Anticipated Discharge Disposition: Acute Rehab     Anticipated Discharge Services: None  Anticipated Discharge DME: None    Patient/family educated on Medicare website which has current facility and service quality ratings: yes  Education Provided on the Discharge Plan:    Patient/Family in Agreement with the Plan: yes    Referrals Placed by CM/SW: Post Acute Facilities  Private pay costs discussed: transportation costs    Additional Information:  Per [facility] ARU liason, patient continues to be appropriate for ARU placement and requests transport at 12 on Monday as there may be a bed available for patient. SW updated patient on this and she is in agreement, excited to start intensive therapy. Patient reports her daughter will be able to transport at 12 on Monday.       DAYNA Carrasco

## 2021-02-28 NOTE — PLAN OF CARE
Pt POD5 s/p L craniotomy for biopsy of parietal tumor.  Pt A/O x 4.  Some WFD.  L pupil larger than right.  Vitals stable.  LS clear.  On RA.  Tolerating DD3 thin diet.  Taking pills whole.  abd soft non tender.  Passing gas.  No voiding difficulties.  Denies pain.  Up with GBW.  Pt scoring green on aggression stoplight tool.  Belongings at bedside.

## 2021-02-28 NOTE — PROGRESS NOTES
Rehab Admissions:  I had the pleasure to speak to both pt and her daughter, La Nena, regarding potential admission to Acute Inpatient Rehab Unit. Provided education regarding therapy expectations and involvement of medical/nursing team at this level of care. Reviewed visiting hours and restrictions including need for one designated visitor.     Thank you for the referral, we will continue to follow this patient for post acute placement.     Determination of admission is based upon the patient's need for an intensive, interdisciplinary approach to rehabilitation, their ability to progress, their ability to tolerate intensive therapies, their need for daily physician supervision, their need for twenty four hour nursing assistance, and their ability and willingness to participate in such a program.    Usha Simons CM  Rehab Liaison/  [facility] Rehabilitation [place] and Transitional Care Unit  2/28/2021    2:30 PM

## 2021-02-28 NOTE — PLAN OF CARE
POD 5 from a crani for L brain mass. A&Ox4. L pupil larger than right, some wfd, otherwise neuros intact. Dd2 with thin liquids, needs help with ordering. Bowel sounds active, last BM per pt 2/26/21. VSS with sbp<150. L incision YOGI and approximated, no drainage. Up with SBA gb/w. Denies pain.Pt scoring green on the Aggression Stop Light Tool. Plan for ARU pending bed availability.

## 2021-03-01 ENCOUNTER — HOSPITAL ENCOUNTER (INPATIENT)
Facility: CLINIC | Age: 76
LOS: 7 days | Discharge: HOME | DRG: 055 | End: 2021-03-08
Attending: PHYSICAL MEDICINE & REHABILITATION | Admitting: PHYSICAL MEDICINE & REHABILITATION
Payer: MEDICARE

## 2021-03-01 ENCOUNTER — APPOINTMENT (OUTPATIENT)
Dept: OCCUPATIONAL THERAPY | Facility: CLINIC | Age: 76
DRG: 025 | End: 2021-03-01
Payer: MEDICARE

## 2021-03-01 ENCOUNTER — APPOINTMENT (OUTPATIENT)
Dept: PHYSICAL THERAPY | Facility: CLINIC | Age: 76
DRG: 025 | End: 2021-03-01
Payer: MEDICARE

## 2021-03-01 VITALS
WEIGHT: 164.1 LBS | SYSTOLIC BLOOD PRESSURE: 137 MMHG | HEIGHT: 63 IN | BODY MASS INDEX: 29.07 KG/M2 | OXYGEN SATURATION: 97 % | RESPIRATION RATE: 16 BRPM | TEMPERATURE: 97.7 F | DIASTOLIC BLOOD PRESSURE: 87 MMHG | HEART RATE: 59 BPM

## 2021-03-01 DIAGNOSIS — Z98.890 S/P CRANIOTOMY: Primary | ICD-10-CM

## 2021-03-01 DIAGNOSIS — G47.30 SLEEP APNEA, UNSPECIFIED TYPE: ICD-10-CM

## 2021-03-01 DIAGNOSIS — G93.89 BRAIN MASS: ICD-10-CM

## 2021-03-01 DIAGNOSIS — F41.9 ANXIETY: ICD-10-CM

## 2021-03-01 DIAGNOSIS — I10 ESSENTIAL HYPERTENSION: ICD-10-CM

## 2021-03-01 LAB
GLUCOSE BLDC GLUCOMTR-MCNC: 102 MG/DL (ref 70–99)
GLUCOSE BLDC GLUCOMTR-MCNC: 103 MG/DL (ref 70–99)
GLUCOSE BLDC GLUCOMTR-MCNC: 121 MG/DL (ref 70–99)

## 2021-03-01 PROCEDURE — 97535 SELF CARE MNGMENT TRAINING: CPT | Mod: GO | Performed by: OCCUPATIONAL THERAPIST

## 2021-03-01 PROCEDURE — 250N000013 HC RX MED GY IP 250 OP 250 PS 637: Performed by: HOSPITALIST

## 2021-03-01 PROCEDURE — 999N000157 HC STATISTIC RCP TIME EA 10 MIN

## 2021-03-01 PROCEDURE — 250N000012 HC RX MED GY IP 250 OP 636 PS 637: Performed by: PHYSICIAN ASSISTANT

## 2021-03-01 PROCEDURE — 94660 CPAP INITIATION&MGMT: CPT

## 2021-03-01 PROCEDURE — 97112 NEUROMUSCULAR REEDUCATION: CPT | Mod: GP | Performed by: PHYSICAL THERAPY ASSISTANT

## 2021-03-01 PROCEDURE — 250N000013 HC RX MED GY IP 250 OP 250 PS 637: Performed by: NURSE PRACTITIONER

## 2021-03-01 PROCEDURE — 250N000013 HC RX MED GY IP 250 OP 250 PS 637: Performed by: PHYSICIAN ASSISTANT

## 2021-03-01 PROCEDURE — 999N001017 HC STATISTIC GLUCOSE BY METER IP

## 2021-03-01 PROCEDURE — 128N000003 HC R&B REHAB

## 2021-03-01 PROCEDURE — 99223 1ST HOSP IP/OBS HIGH 75: CPT | Performed by: PHYSICAL MEDICINE & REHABILITATION

## 2021-03-01 PROCEDURE — 99239 HOSP IP/OBS DSCHRG MGMT >30: CPT | Performed by: HOSPITALIST

## 2021-03-01 RX ORDER — LORAZEPAM 0.5 MG/1
0.5 TABLET ORAL 2 TIMES DAILY PRN
Status: DISCONTINUED | OUTPATIENT
Start: 2021-03-01 | End: 2021-03-08 | Stop reason: HOSPADM

## 2021-03-01 RX ORDER — ACETAMINOPHEN 500 MG
500-1000 TABLET ORAL EVERY 8 HOURS PRN
Status: DISCONTINUED | OUTPATIENT
Start: 2021-03-01 | End: 2021-03-08 | Stop reason: HOSPADM

## 2021-03-01 RX ORDER — DEXAMETHASONE 2 MG/1
2 TABLET ORAL EVERY 8 HOURS SCHEDULED
Status: DISCONTINUED | OUTPATIENT
Start: 2021-03-09 | End: 2021-03-08 | Stop reason: HOSPADM

## 2021-03-01 RX ORDER — AMOXICILLIN 250 MG
1 CAPSULE ORAL
Status: DISCONTINUED | OUTPATIENT
Start: 2021-03-01 | End: 2021-03-08 | Stop reason: HOSPADM

## 2021-03-01 RX ORDER — DOCUSATE SODIUM 100 MG/1
100 CAPSULE, LIQUID FILLED ORAL 2 TIMES DAILY PRN
Status: ON HOLD | DISCHARGE
Start: 2021-03-01 | End: 2021-03-27

## 2021-03-01 RX ORDER — DEXAMETHASONE 2 MG/1
4 TABLET ORAL EVERY 8 HOURS SCHEDULED
Status: DISCONTINUED | OUTPATIENT
Start: 2021-03-01 | End: 2021-03-08 | Stop reason: HOSPADM

## 2021-03-01 RX ORDER — FAMOTIDINE 20 MG/1
20 TABLET, FILM COATED ORAL 2 TIMES DAILY
Status: ON HOLD | DISCHARGE
Start: 2021-03-01 | End: 2021-03-08

## 2021-03-01 RX ORDER — SENNOSIDES 8.6 MG
2 TABLET ORAL 2 TIMES DAILY
DISCHARGE
Start: 2021-03-01 | End: 2021-03-01

## 2021-03-01 RX ORDER — AMLODIPINE BESYLATE 10 MG/1
10 TABLET ORAL DAILY
Status: DISCONTINUED | OUTPATIENT
Start: 2021-03-02 | End: 2021-03-08 | Stop reason: HOSPADM

## 2021-03-01 RX ORDER — HYDRALAZINE HYDROCHLORIDE 10 MG/1
10 TABLET, FILM COATED ORAL 4 TIMES DAILY PRN
Status: DISCONTINUED | OUTPATIENT
Start: 2021-03-01 | End: 2021-03-08 | Stop reason: HOSPADM

## 2021-03-01 RX ORDER — AMLODIPINE BESYLATE 10 MG/1
10 TABLET ORAL DAILY
Status: ON HOLD | DISCHARGE
Start: 2021-03-02 | End: 2021-03-08

## 2021-03-01 RX ORDER — AMOXICILLIN 250 MG
1 CAPSULE ORAL
DISCHARGE
Start: 2021-03-01 | End: 2021-03-23

## 2021-03-01 RX ORDER — BISACODYL 10 MG
10 SUPPOSITORY, RECTAL RECTAL DAILY PRN
Status: DISCONTINUED | OUTPATIENT
Start: 2021-03-01 | End: 2021-03-08 | Stop reason: HOSPADM

## 2021-03-01 RX ORDER — DEXAMETHASONE 4 MG/1
TABLET ORAL
Status: ON HOLD | DISCHARGE
Start: 2021-03-01 | End: 2021-03-08

## 2021-03-01 RX ORDER — LEVETIRACETAM 500 MG/1
500 TABLET ORAL 2 TIMES DAILY
Status: DISCONTINUED | OUTPATIENT
Start: 2021-03-01 | End: 2021-03-08 | Stop reason: HOSPADM

## 2021-03-01 RX ORDER — FAMOTIDINE 20 MG/1
20 TABLET, FILM COATED ORAL 2 TIMES DAILY
Status: DISCONTINUED | OUTPATIENT
Start: 2021-03-01 | End: 2021-03-08 | Stop reason: HOSPADM

## 2021-03-01 RX ORDER — SENNOSIDES 8.6 MG
2 TABLET ORAL 2 TIMES DAILY
Status: DISCONTINUED | OUTPATIENT
Start: 2021-03-01 | End: 2021-03-02

## 2021-03-01 RX ORDER — BUSPIRONE HYDROCHLORIDE 15 MG/1
30 TABLET ORAL 2 TIMES DAILY
Status: DISCONTINUED | OUTPATIENT
Start: 2021-03-01 | End: 2021-03-08 | Stop reason: HOSPADM

## 2021-03-01 RX ORDER — LORAZEPAM 0.5 MG/1
0.5 TABLET ORAL 2 TIMES DAILY PRN
Qty: 10 TABLET | Refills: 0 | Status: ON HOLD | OUTPATIENT
Start: 2021-03-01 | End: 2021-03-08

## 2021-03-01 RX ORDER — DOCUSATE SODIUM 100 MG/1
100 CAPSULE, LIQUID FILLED ORAL 2 TIMES DAILY PRN
Status: DISCONTINUED | OUTPATIENT
Start: 2021-03-01 | End: 2021-03-08 | Stop reason: HOSPADM

## 2021-03-01 RX ORDER — LEVETIRACETAM 500 MG/1
500 TABLET ORAL 2 TIMES DAILY
Status: ON HOLD | DISCHARGE
Start: 2021-03-01 | End: 2021-03-08

## 2021-03-01 RX ADMIN — FAMOTIDINE 20 MG: 20 TABLET ORAL at 08:14

## 2021-03-01 RX ADMIN — LORAZEPAM 0.5 MG: 0.5 TABLET ORAL at 22:14

## 2021-03-01 RX ADMIN — FAMOTIDINE 20 MG: 20 TABLET, FILM COATED ORAL at 22:10

## 2021-03-01 RX ADMIN — LEVETIRACETAM 500 MG: 500 TABLET, FILM COATED ORAL at 22:10

## 2021-03-01 RX ADMIN — BUSPIRONE HYDROCHLORIDE 30 MG: 15 TABLET ORAL at 08:14

## 2021-03-01 RX ADMIN — LEVETIRACETAM 500 MG: 500 TABLET, FILM COATED ORAL at 08:15

## 2021-03-01 RX ADMIN — STANDARDIZED SENNA CONCENTRATE 1 TABLET: 8.6 TABLET ORAL at 08:15

## 2021-03-01 RX ADMIN — BUSPIRONE HYDROCHLORIDE 30 MG: 15 TABLET ORAL at 22:10

## 2021-03-01 RX ADMIN — FLUOXETINE 80 MG: 20 CAPSULE ORAL at 08:14

## 2021-03-01 RX ADMIN — AMLODIPINE BESYLATE 10 MG: 10 TABLET ORAL at 08:15

## 2021-03-01 RX ADMIN — DEXAMETHASONE 4 MG: 2 TABLET ORAL at 23:24

## 2021-03-01 RX ADMIN — ACETAMINOPHEN 500 MG: 500 TABLET ORAL at 22:14

## 2021-03-01 RX ADMIN — DEXAMETHASONE 4 MG: 4 TABLET ORAL at 06:24

## 2021-03-01 RX ADMIN — DEXAMETHASONE 4 MG: 2 TABLET ORAL at 16:20

## 2021-03-01 ASSESSMENT — ACTIVITIES OF DAILY LIVING (ADL)
FALL_HISTORY_WITHIN_LAST_SIX_MONTHS: NO
DIFFICULTY_EATING/SWALLOWING: NO
TOILETING_ISSUES: NO
ADLS_ACUITY_SCORE: 19
DOING_ERRANDS_INDEPENDENTLY_DIFFICULTY: NO
ADLS_ACUITY_SCORE: 19
ADLS_ACUITY_SCORE: 19
ADLS_ACUITY_SCORE: 18

## 2021-03-01 ASSESSMENT — MIFFLIN-ST. JEOR: SCORE: 1199.86

## 2021-03-01 NOTE — PLAN OF CARE
Speech Language Therapy Discharge Summary    Reason for therapy discharge:    Discharged to acute rehabilitation facility.    Progress towards therapy goal(s). See goals on Care Plan in [facility] electronic health record for goal details.  Goals partially met.  Barriers to achieving goals:   discharge from facility.    Therapy recommendation(s):    Continued therapy is recommended.  Rationale/Recommendations:  SLP at next level of care for ongoing management of dysphagia and speech/language.      At time of discharge - Dysphagia diet level 3 and thin liquids with safe swallow strategies and mild word finding deficits

## 2021-03-01 NOTE — PLAN OF CARE
PT: visited with pt for day-of-admission evaluation. Pt politely declined OOB activity, and denied equipment needs. Will defer PT eval to tomorrow as normally scheduled.

## 2021-03-01 NOTE — PLAN OF CARE
POD 6 from a crani for L brain mass. A&Ox3-4 forgetful at times. L pupil larger than right, some wfd, otherwise neuros intact. Dd2 with thin liquids, needs help with ordering meals. Continent of bladder, no BM on this shift. VSS with sbp<150. L incision YOGI and approximated, no drainage. Up with SBA gb/w. Denies pain. Pt scoring green on the Aggression Stop Light Tool. Plan for ARU today.
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
## 2021-03-01 NOTE — PLAN OF CARE
Neuros stable.  Slightly forgetful, word finding difficulties.  Denies pain.  Left head incision CDI with staples, open to air.  Good appetite.  Blood glucose WNL.  Blood pressure within parameters.  Up in room and halls with SBA, walker, gait belt.  Plan for discharge to ARU at 1200 noon.  Report given to ARU.

## 2021-03-01 NOTE — PROGRESS NOTES
"SPIRITUAL HEALTH SERVICES Progress Note  [facility] 73    Referral Source: Follow-up visit.    PT indicates that the outcome of her stay in hospital went as well as she could have expected and that \"I have everything that I need right now.\" PT states that she is highly motivated to continue her recovery in ARU.    PT stated that she feels she has received excellent care at [facility] and expressed gratitude for all of her caregivers on hospital staff.    No follow-up needed as PT is scheduled to discharge from hosptial today.      Doug Silver  Chaplain Resident   "

## 2021-03-01 NOTE — PROGRESS NOTES
FOCUS/GOAL  Pain management, Medical management and Mobility    ASSESSMENT, INTERVENTIONS AND CONTINUING PLAN FOR GOAL:  Patient arrived to unit via family transport at approx. 13:30.  Is alert and oriented with some word finding difficulty.  Denies pain, SOB, nausea, chest pain.  BP upon arrival 165/83 and recheck 152/71, PA on unit was updated.  Patient in bed resting.  Ate lunch prior to transfer and writer placed order for room service with assist for future meals.  Patient was continent using toilet in bathroom, LBM 3/1/21 after arrival to unit.  Patient ambulated to bathroom with assist of 1 and gait belt.

## 2021-03-01 NOTE — PLAN OF CARE
Pt POD5 s/p L craniotomy for biopsy of parietal tumor. PT alert and oriented x4. L pupil larger than R pupil. Pt has some word finding difficulty. Pt denies pain. Pt up with SBA/GB/W. Pt on DD3 diet with thin liquids, takes pills whole with water. Pt to discharge to ARU tomorrow at noon.     Pt greenlight on stoplight aggression tool.   Pt belongings at bedside.

## 2021-03-01 NOTE — DISCHARGE SUMMARY
[facility]  Hospitalist Discharge Summary      Date of Admission:  2/17/2021  Date of Discharge:  3/1/2021 12:55 PM  Discharging Provider: Duke Bernabe MD      Discharge Diagnoses   Left frontal parietal mass with edema without midline shift, concern of glioma s/p L parietal craniotomy and excisional biopsy and resection  Expressive aphasia (word finding) and dysphagia secondary to the above  Acute anemia, likely blood loss during surgery  Hyperglycemia, likely secondary to steroids  Hypertension, likely in part due to steroids vs underlying essential hypertension  Depression and anxiety  Asthma  Nonsevere malnutrition in setting of acute illness  COVID 19 PCR Negative 2/17/21    Follow-ups Needed After Discharge   Follow-up Appointments     Follow Up and recommended labs and tests      ARU MD for hospital follow up with CBC and BMP - pathology results and   treatment plans  Neurosurgery 3/9/21 - staple removal  Oncology clinic referral             Unresulted Labs Ordered in the Past 30 Days of this Admission     Date and Time Order Name Status Description    2/23/2021 1814 Surgical pathology exam In process       These results will be followed up by Neurosurgery - they will call patient and arrange appropriate oncology follow up    Discharge Disposition   Discharged to acute rehabilitation facility  Condition at discharge: Stable      Hospital Course   Olga is a markedly pleasant 75 year old female with history including asthma, depression/anxiety and right occipital meningioma, who presented 2/17/2021 with recent headache, speech and reading problems and was found by MRI with a new enhancing left frontal-parietal mass concerning for primary brain neoplasm.     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 MRI ordered in clinic 2/17 an enhancing 3.3 x 2.7 x 2.9 cm intra-axial mass in the lateral left frontoparietal region with signs of moderate amount of edema w/o midline shift, overall concerning for high grade primary brain neoplasm; unchanged small right occipital meningioma. Subsequently sent to [facility] and admitted.   * Started on IV dexamethasone. Neurosurgery consulted. CT CAP 2/18 did not show signs of a primary malignancy. MRI brain with contrast 2/19 showed a 3.3 x 2.8 x 2.8 cm enhancing mass in left frontal parietal region with dural base, could be intra-axial or extra-axial; 0.5 x 1.0 x 1.0 cm enhancing mass in right occipital lobe which was dural based, appeared to be extra-axial.  - APAP scheduled, after discussion with patient and her daughter  - await pathology - per Neurosurgery note, they will relay path results with patient and arrange appropriate oncology consultation  - keppra bid po continued for 6 weeks post op  - decadron PO taper over next 2 weeks per NS  - NS ok with discharge to ARU - NS f/u 3/9 with staple removal  - 2/25 - 2/28 continued overall improvement reported per patient and her daughter in room.  No new focal neurological deficits.  - continue working with therapies -  ARU Monday     Acute anemia, likely blood loss in post operative setting  Adm hgb 11.7; 10.3 on 2/24 and 9.9 on 2/25 --> 9.7 2/26 --> 9.9 2/28. No signs or reports of bleeding clinically.   - Continue to monitor clinically / as indicated    Hyperglycemia, mild, secondary to steroids  Hgb A1C 5.4 2/18/2021. Sugars remain largely at goal.  - ISS in hospital  - likely unnecessary to follow blood sugars in ARU, follow up with ARU MD     Hypertension, possibly due to steroids or underlying benign essential HTN  BP's frequently elevated this hospitalization. Started on amlodipine 2/21.  - Continue PRN IV hydralazine and PRN IV labetalol.  - 2/26 AM , increased amlodipine to 10mg  daily  - 2/27 - BP improved, will give new dose a couple days to have full affect  - 3/1 - BP stable/improved but will need further monitoring and possibly medication addition/adjustment     Leukocytosis, suspect due to steroids and post op state  No clear signs of infection. WBC 12.8 on 2/20/21, normal on adm.  - Monitor clinically for signs of infection.  - WBC 19k on pod 1, afebrile. Periop abx per surgery --> wbc 14k 2/25 --> 15k 2/28 afebrile  - likely secondary to steroids, afebrile, nontoxic at time of discharge     Depression/anxiety  Bupropion held because of potential to lower seizure threshold. Started on PRN lorazepam this hospitalization.  - Continue buspirone, fluoxetine.   - Continue PRN PO lorazepam 0.5 mg bid prn at ARU, to be managed further with ARU MD and team     Asthma  Not on bronchodilators PTA.  - Monitor clinically. CTABL and on RA 2/23 - 3/1.     Nonsevere malnutrition in setting of acute illness  Nutrition following, appreciated  - speech following, defer diet to them  - encourage PO intake and supplement as needed    COVID-19 testing  2/17/21 PCR negative, 8/8/20 PCR negative      Consultations This Hospital Stay   NEUROSURGERY IP CONSULT  PHYSICAL THERAPY ADULT IP CONSULT  OCCUPATIONAL THERAPY ADULT IP CONSULT  SPIRITUAL HEALTH SERVICES IP CONSULT  SPEECH LANGUAGE PATH ADULT IP CONSULT  CARE MANAGEMENT / SOCIAL WORK IP CONSULT  PHYSICAL THERAPY ADULT IP CONSULT  OCCUPATIONAL THERAPY ADULT IP CONSULT  SPEECH LANGUAGE PATH ADULT IP CONSULT    Code Status   Full Code    Time Spent on this Encounter   I, Duke Bernabe MD, personally saw the patient today and spent greater than 30 minutes discharging this patient.       Duke Bernabe MD  [place] [address] [facility]  [facility] [place] [address]  Phone: [phone]  ______________________________________________________________________    Physical Exam   Vital Signs: Temp: 97.7  F (36.5  C) Temp src:  Oral BP: 137/87 Pulse: 59   Resp: 16 SpO2: 97 % O2 Device: None (Room air)    Weight: 164 lbs 1.6 oz    Gen: NAD, very pleasant, appreciative of cares  HEENT: Normocephalic, EOMI, MMM  Resp: no crackles,  no wheezes, no increased work of resp  CV: S1S2 heard, reg rhythm, reg rate, no pedal edema  Abdo: soft, nontender, nondistended, bowel sounds present  Ext: calves nontender, well perfused  Neuro: AAOx3, CN grossly intact, no facial asymmetry, strength unchanged from prior days, very slight decrease in RUE as prior, otherwise symmetrical, ongoing occasional word finding difficulty         Primary Care Physician   [facility]    Discharge Orders      General info for SNF    Length of Stay Estimate: Short Term Care: Estimated # of Days <30  Condition at Discharge: Stable  Level of care:skilled   Rehabilitation Potential: Fair  Admission H&P remains valid and up-to-date: Yes  Recent Chemotherapy: N/A  Use Nursing Home Standing Orders: Yes     Mantoux instructions    Give two-step Mantoux (PPD) Per Facility Policy Yes     Reason for your hospital stay    Word finding difficulty and weakness     Intake and output    Every shift     Daily weights    Call Provider for weight gain of more than 2 pounds per day or 5 pounds per week.     Follow Up and recommended labs and tests    ARU MD for hospital follow up with CBC and BMP - pathology results and treatment plans  Neurosurgery 3/9/21 - staple removal  Oncology clinic referral     Activity - Up with assistive device     Physical Therapy Adult Consult    Evaluate and treat as clinically indicated.    Reason:  Deconditioned, L frontal parietal mass s/p excision     Occupational Therapy Adult Consult    Evaluate and treat as clinically indicated.    Reason:  Deconditioned, L frontal parietal mass s/p excision     Speech Language Path Adult Consult    Evaluate and treat as clinically indicated.    Reason:  Dysphagia, mild expressive aphasia, Deconditioned, L frontal  parietal mass s/p excision     Fall precautions     Advance Diet as Tolerated    Follow this diet upon discharge: Orders Placed This Encounter      Room Service      Dysphagia Diet Level 3 Advanced Thin Liquids (water, ice chips, juice, milk gelatin, ice cream, etc) (no straws)       Significant Results and Procedures   Most Recent 3 CBC's:  Recent Labs   Lab Test 02/28/21  0802 02/26/21  0745 02/25/21  0750   WBC 15.6* 14.3* 14.4*   HGB 9.9* 9.7* 9.9*   MCV 89 89 89    302 298     Most Recent 3 BMP's:  Recent Labs   Lab Test 02/24/21  0703 02/22/21  0859 02/20/21  0929    136 136   POTASSIUM 4.3 3.9 4.3   CHLORIDE 102 105 106   CO2 27 27 26   BUN 28 28 26   CR 0.64 0.77 0.77   ANIONGAP 6 4 4   ESTIVEN 8.5 9.1 9.7   * 100* 127*     Most Recent 3 Troponin's:No lab results found.  Most Recent D-dimer:No lab results found.  Most Recent Cholesterol Panel:No lab results found.  Most Recent TSH and T4:No lab results found.  Most Recent Hemoglobin A1c:  Recent Labs   Lab Test 02/18/21  0735   A1C 5.4   ,   Results for orders placed or performed during the hospital encounter of 02/17/21   CT Chest/Abdomen/Pelvis w Contrast    Narrative    CT CHEST/ABDOMEN/PELVIS WITH CONTRAST  2/18/2021 3:31 PM    CLINICAL HISTORY: Brain mass, stroke, evaluate for primary malignancy.    TECHNIQUE: CT scan of the chest, abdomen, and pelvis was performed  following injection of IV contrast. Multiplanar reformats were  obtained. Dose reduction techniques were used.   CONTRAST: 79 mL Isovue-370    COMPARISON: None.    FINDINGS:   LUNGS AND PLEURA: Lungs are clear. No pleural effusions.    MEDIASTINUM/AXILLAE: No adenopathy or aneurysm.    HEPATOBILIARY: No significant mass or bile duct dilatation. No  calcified gallstones.     PANCREAS: No significant mass, duct dilatation, or inflammatory  change.    SPLEEN: Normal size.    ADRENAL GLANDS: No significant nodules.    KIDNEYS/BLADDER: No significant mass, stones, or  hydronephrosis.  Low-attenuation subcentimeter renal lesion(s). These are compatible  small benign cysts and no specific imaging evaluation or follow-up is  recommended.    BOWEL: No obstruction or inflammatory change.    PELVIC ORGANS: No pelvic masses.    ADDITIONAL FINDINGS: No adenopathy or free fluid. There are moderate  atherosclerotic changes of the visualized aorta and its branches.  There is no evidence of aortic dissection or aneurysm.    MUSCULOSKELETAL: Survey of the visualized bony structures demonstrates  no destructive bony lesions.      Impression    IMPRESSION:  No primary malignancy demonstrated in the chest, abdomen,  and pelvis.    NETTE HAWLEY MD   MR Brain w Contrast Stereotactic    Narrative    MR BRAIN WITH CONTRAST STEREOTACTIC 2/19/2021 9:15 AM     HISTORY: Left frontal parietal mass. MRI stealth protocol requested  for neurosurgical purposes prior to neurosurgical intervention.  History of skin carcinoma.    TECHNIQUE: Multiplanar multisequence images were obtained through the  brain with intravenous contrast. 20 mL of Gadavist given.    COMPARISON: MR dated 2/17/2021.    FINDINGS: Exam was performed for neurosurgical stereotactic  localization purposes. There is a heterogeneous enhancing mass in the  left frontal parietal region measuring 3.3 x 2.8 x 2.8 cm. There is  extensive surrounding edema. This mass is dural based and could be  intra-axial or extra-axial. There is a second enhancing lesion which  is smaller and also dural based in the right occipital lobe. This  measures approximately 0.5 cm in thickness and 1.0 x 1.0 cm at its  base. No other abnormal areas of enhancement are evident. There is  some moderately extensive white matter changes as well as some subtle  changes in the brainstem without mass effect or enhancement.  Ventricles are felt to be within normal limits for age.      Impression    IMPRESSION:  1. 3.3 x 2.8 x 2.8 cm enhancing mass in left frontal parietal  region  with dural base. This could be intra-axial or extra-axial.  2. 0.5 x 1.0 x 1.0 cm enhancing mass in right occipital lobe which is  dural based. This appears to be extra-axial.  3. Exam performed for neurosurgical purposes.    GO LINDSEY MD   XR Surgery YEISON L/T 5 Min Fluoro w Stills    Narrative    SURGERY C-ARM FLUORO LESS THAN 5 MIN W STILLS February 23, 2021 5:25  PM     HISTORY: Left parietal craniotomy for tumor resection/ FT:1.9/  IMAGE:2+3SPINS/ [id number]/ O-ARM/ OR32.    COMPARISON: None.    NUMBER OF IMAGES ACQUIRED: Two fluoroscopic images and three 0-arm  sequences.    VIEWS: [facility]inite.    FLUOROSCOPY TIME: 1.9 minute(s)      Impression    IMPRESSION: Multiple fluoroscopic and tomographic views of the skull  acquired during tumor resection.    OWEN MARTIN MD   CT Head w/o Contrast    Narrative    CT OF THE HEAD WITHOUT CONTRAST  2/24/2021 9:01 AM     COMPARISON: Preoperative brain MR 2/19/2021.    HISTORY: Brain mass or lesion.    TECHNIQUE: 5 mm thick axial CT images of the head were acquired  without IV contrast material.    FINDINGS: There have been interval surgical changes including a small  left parietal craniotomy and creation of a surgical resection cavity  in the posterolateral aspect of the left frontal lobe. There is a  small amount of gas and hemorrhage within the surgical resection  cavity. No other significant intracranial hemorrhage. Probable  vasogenic edema in the white matter surrounding the surgical resection  cavity again noted. There is mild diffuse cerebral volume loss. There  are subtle patchy areas of decreased density in the cerebral white  matter bilaterally that are consistent with sequela of chronic small  vessel ischemic disease.     The ventricles and basal cisterns are within normal limits in  configuration given the degree of cerebral volume loss.  There is no  midline shift. There are no extra-axial fluid collections.     No intracranial mass or recent  infarct.    The visualized paranasal sinuses are well-aerated. There is no  mastoiditis. There are no fractures of the visualized bones.       Impression    IMPRESSION:   1. Postoperative change for resection of the mass from the  posterolateral aspect of the left frontal lobe. Small amount of gas  and hemorrhage within the surgical resection cavity. No evidence for  significant intracranial hemorrhage.  2. Mild persistent vasogenic edema in the brain parenchyma surrounding  the surgical resection cavity.  3. Diffuse cerebral volume loss and cerebral white matter changes  consistent with chronic small vessel ischemic disease. No evidence for  acute intracranial pathology.         Radiation dose for this scan was reduced using automated exposure  control, adjustment of the mA and/or kV according to patient size, or  iterative reconstruction technique.    OWEN MARTIN MD   MR Brain w/o & w Contrast    Narrative    MRI OF THE BRAIN WITHOUT AND WITH CONTRAST 2/25/2021 6:33 PM     COMPARISON: Head CT 2/24/2021, brain MR 2/19/2021.    HISTORY:  Brain mass or lesion.    TECHNIQUE: Axial diffusion-weighted with ADC map, axial T2-weighted  with fat saturation, axial T1-weighted, axial turboFLAIR and coronal  T1-weighted images of the brain were acquired without intravenous  contrast.  Following intravenous administration of gadolinium (7 mL  Gadavist ), axial T1-weighted images of the brain were acquired.     FINDINGS: Recent postoperative change consisting of a small right  parietal craniotomy and surgical resection cavity at the  posterolateral aspect of the left frontal lobe again noted. Mixed  signal intensity fluid within the surgical resection cavity likely  representing a small amount of CSF and a small amount of hemorrhage  again noted. The surgical resection cavity appears more distended than  on the comparison study, now measuring 3.5 x 2.9 x 2.9 cm in the  greatest AP, transverse and cephalocaudal dimensions  respectively  compared to approximately 2.7 x 1.6 cm in the AP and transverse  dimensions respectively on the comparison study. This increase in size  of the surgical resection cavity could be due to slight interval  increase in hemorrhage. Continued surveillance recommended. Minimal  patchy abnormal contrast enhancement at the peripheral margins of the  surgical resection cavity is noted most likely representing  postoperative change, but residual enhancing tumor cannot be  completely excluded. Moderate vasogenic edema in the posterior aspect  of the left cerebral hemisphere surrounding the surgical resection  cavity is again noted without appreciable change. Rightward midline  shift of the septum pellucidum measuring 2-3 mm is again noted without  appreciable change.    There is moderate diffuse cerebral volume loss. There are numerous  scattered focal and patchy periventricular areas of abnormal T2 signal  hyperintensity in the cerebral white matter bilaterally that are  consistent with sequela of chronic small vessel ischemic disease. The  ventricles and basal cisterns are within normal limits in  configuration given the degree of cerebral volume loss. There is no  evidence for stroke or acute intracranial hemorrhage.    There is no sinusitis or mastoiditis.      Impression    IMPRESSION:   1. Postoperative change consisting of a small right parietal  craniotomy and small surgical resection cavity in the posterolateral  aspect of the left frontal lobe again noted.  2. Slight interval increase in distention of the surgical resection  cavity possibly due to a small amount of interval hemorrhage.  Continued surveillance recommended.  3. Minimal patchy abnormal contrast enhancement at the margins of the  surgical resection cavity most likely represents postoperative change,  but residual tumor cannot be completely excluded. Continued  surveillance recommended.  4. 2-3 mm rightward midline shift of the septum  pellucidum is stable  from the comparison study.  5. Diffuse cerebral volume loss and cerebral white matter changes  consistent with chronic small vessel ischemic disease.    OWEN MARTIN MD       Discharge Medications   Discharge Medication List as of 3/1/2021 11:55 AM      START taking these medications    Details   amLODIPine (NORVASC) 10 MG tablet Take 1 tablet (10 mg) by mouth daily, Transitional      dexamethasone (DECADRON) 4 MG tablet Take 1 tablet (4 mg) by mouth every 8 hours for 7 days, THEN 0.5 tablets (2 mg) every 8 hours for 7 days., Transitional      docusate sodium (COLACE) 100 MG capsule Take 1 capsule (100 mg) by mouth 2 times daily as needed for constipation, Transitional      famotidine (PEPCID) 20 MG tablet Take 1 tablet (20 mg) by mouth 2 times daily, Transitional      levETIRAcetam (KEPPRA) 500 MG tablet Take 1 tablet (500 mg) by mouth 2 times daily, Transitional      LORazepam (ATIVAN) 0.5 MG tablet Take 1 tablet (0.5 mg) by mouth 2 times daily as needed for agitation or anxiety, Disp-10 tablet, R-0, Local Print      senna-docusate (SENOKOT-S/PERICOLACE) 8.6-50 MG tablet Take 1 tablet by mouth nightly as needed for constipation, Transitional         CONTINUE these medications which have NOT CHANGED    Details   acetaminophen (TYLENOL) 500 MG tablet Take 500 mg by mouth 2 times daily as needed for mild pain, Historical      busPIRone HCl (BUSPAR) 30 MG tablet Take 30 mg by mouth 2 times daily, Historical      FLUoxetine (PROZAC) 20 MG capsule Take 80 mg by mouth daily, Historical         STOP taking these medications       buPROPion (WELLBUTRIN XL) 150 MG 24 hr tablet Comments:   Reason for Stopping:         sennosides (SENOKOT) 8.6 MG tablet Comments:   Reason for Stopping:             Allergies   Allergies   Allergen Reactions     Bacitracin Rash     Bactroban is effective; No difficulties     Erythromycin      intolerant.     Meperidine Hcl      intolerant only   Demerol     Chloraprep  One Step Rash

## 2021-03-01 NOTE — PROGRESS NOTES
[facility]    Neurosurgery Progress Note    Date of Service (when I saw the patient): 03/01/2021     Assessment & Plan   Olga Bailey is a 75 year old female with a history of skin carcinoma and meningioma (followed by neurologist Dr. Jayjay Tomlin) who was admitted on 2/17/2021. She presented with a brain mass. She is s/p left parietal craniotomy for tumor resection with Dr. Cummings. Today she was seen lying in bed. She reports a mild headache. She denies dizziness, nausea/vomiting, and vision changes. She continues to note improvement in her right arm/hand. She continues to have word finding difficulties which have improved since surgery.    Principal Problem:    Brain mass, 3.3 cm left Frontal Parietal     Assessment: s/p left parietal craniotomy    Plan:   -Continue decadron at 4mg TID and taper down to 2mg TID over 2 weeks until follow up  -Keppra 6 weeks post op  -Pathology pending, NS clinic will contact patient with results when available and make referral to Oncology at that time.  -Continue therapies  -Ok to discharge when bed available  -Follow up in NS clinic 3/9/21 for staple removal    Funmilayo Hampton CNP  [facility] Neurosurgery  [facility]  [address] [place] [address]  [address] [place] [address]    Tel [phone]  Pager [phone]      Interval History   Stable.    Physical Exam   Temp: 97.7  F (36.5  C) Temp src: Oral BP: 137/87 Pulse: 59   Resp: 16 SpO2: 97 % O2 Device: None (Room air)    Vitals:    02/22/21 0625 02/24/21 0100 02/26/21 0656   Weight: 152 lb (68.9 kg) 162 lb 14.7 oz (73.9 kg) 164 lb 1.6 oz (74.4 kg)     Vital Signs with Ranges  Temp:  [97.4  F (36.3  C)-98.4  F (36.9  C)] 97.7  F (36.5  C)  Pulse:  [56-69] 59  Resp:  [16] 16  BP: (134-145)/(72-87) 137/87  SpO2:  [97 %-98 %] 97 %  No intake/output data recorded.     , Blood pressure 137/87, pulse 59, temperature 97.7  F (36.5  C), temperature source Oral, resp. rate 16, height  "5' 2.52\" (1.588 m), weight 164 lb 1.6 oz (74.4 kg), SpO2 97 %.  164 lbs 1.6 oz  HEENT:  Normocephalic.    Heart:  No peripheral edema  Lungs:  No SOB  Skin:  Warm and dry, good capillary refill. Incision intact.  Extremities:  Good radial and dorsalis pedis pulses bilaterally, no edema, cyanosis or clubbing.    NEUROLOGICAL EXAMINATION:   Mental status:  Alert and follows commands.  Motor:  Strength is 5/5 throughout the upper and lower extremities except RUE slightly weaker.    Medications       amLODIPine  10 mg Oral Daily     busPIRone HCl  30 mg Oral BID     dexamethasone  4 mg Oral Q8H ELGIN     famotidine  20 mg Oral BID     FLUoxetine  80 mg Oral Daily     insulin aspart  1-10 Units Subcutaneous TID AC     insulin aspart  1-7 Units Subcutaneous At Bedtime     levETIRAcetam  500 mg Oral BID     sennosides  2 tablet Oral BID       Data     CBC RESULTS:   Recent Labs   Lab Test 02/18/21  0735   WBC 7.0   RBC 4.17   HGB 12.1   HCT 37.6   MCV 90   MCH 29.0   MCHC 32.2   RDW 13.5        Basic Metabolic Panel:  Lab Results   Component Value Date     02/18/2021      Lab Results   Component Value Date    POTASSIUM 4.2 02/18/2021     Lab Results   Component Value Date    CHLORIDE 106 02/18/2021     Lab Results   Component Value Date    ESTIVEN 9.4 02/18/2021     Lab Results   Component Value Date    CO2 28 02/18/2021     Lab Results   Component Value Date    BUN 19 02/18/2021     Lab Results   Component Value Date    CR 0.74 02/18/2021     Lab Results   Component Value Date     02/18/2021     INR:  Lab Results   Component Value Date    INR 1.03 02/18/2021    INR 0.98 02/17/2021         "

## 2021-03-02 ENCOUNTER — APPOINTMENT (OUTPATIENT)
Dept: SPEECH THERAPY | Facility: CLINIC | Age: 76
End: 2021-03-02
Payer: MEDICARE

## 2021-03-02 ENCOUNTER — APPOINTMENT (OUTPATIENT)
Dept: OCCUPATIONAL THERAPY | Facility: CLINIC | Age: 76
End: 2021-03-02
Payer: MEDICARE

## 2021-03-02 ENCOUNTER — APPOINTMENT (OUTPATIENT)
Dept: PHYSICAL THERAPY | Facility: CLINIC | Age: 76
End: 2021-03-02
Payer: MEDICARE

## 2021-03-02 LAB
COPATH REPORT: NORMAL
CORTIS SERPL-MCNC: 0.8 UG/DL (ref 4–22)
INTERPRETATION ECG - MUSE: NORMAL

## 2021-03-02 PROCEDURE — 92523 SPEECH SOUND LANG COMPREHEN: CPT | Mod: GN | Performed by: SPEECH-LANGUAGE PATHOLOGIST

## 2021-03-02 PROCEDURE — 250N000012 HC RX MED GY IP 250 OP 636 PS 637: Performed by: PHYSICIAN ASSISTANT

## 2021-03-02 PROCEDURE — 97116 GAIT TRAINING THERAPY: CPT | Mod: GP

## 2021-03-02 PROCEDURE — 250N000013 HC RX MED GY IP 250 OP 250 PS 637: Performed by: PHYSICIAN ASSISTANT

## 2021-03-02 PROCEDURE — 97530 THERAPEUTIC ACTIVITIES: CPT | Mod: GO | Performed by: OCCUPATIONAL THERAPIST

## 2021-03-02 PROCEDURE — 82533 TOTAL CORTISOL: CPT | Performed by: PHYSICIAN ASSISTANT

## 2021-03-02 PROCEDURE — 97165 OT EVAL LOW COMPLEX 30 MIN: CPT | Mod: GO | Performed by: OCCUPATIONAL THERAPIST

## 2021-03-02 PROCEDURE — 99232 SBSQ HOSP IP/OBS MODERATE 35: CPT | Performed by: PHYSICIAN ASSISTANT

## 2021-03-02 PROCEDURE — 97162 PT EVAL MOD COMPLEX 30 MIN: CPT | Mod: GP

## 2021-03-02 PROCEDURE — 272N000064 HC CIRCUIT HUMIDITY W/CPAP BIPAP

## 2021-03-02 PROCEDURE — 97535 SELF CARE MNGMENT TRAINING: CPT | Mod: GO | Performed by: OCCUPATIONAL THERAPIST

## 2021-03-02 PROCEDURE — 128N000003 HC R&B REHAB

## 2021-03-02 PROCEDURE — 97112 NEUROMUSCULAR REEDUCATION: CPT | Mod: GP

## 2021-03-02 PROCEDURE — 36415 COLL VENOUS BLD VENIPUNCTURE: CPT | Performed by: PHYSICIAN ASSISTANT

## 2021-03-02 PROCEDURE — 97530 THERAPEUTIC ACTIVITIES: CPT | Mod: GP

## 2021-03-02 RX ORDER — CALCIUM CARBONATE 500 MG/1
500 TABLET, CHEWABLE ORAL 2 TIMES DAILY PRN
Status: DISCONTINUED | OUTPATIENT
Start: 2021-03-02 | End: 2021-03-08 | Stop reason: HOSPADM

## 2021-03-02 RX ORDER — SENNOSIDES 8.6 MG
2 TABLET ORAL 2 TIMES DAILY PRN
Status: DISCONTINUED | OUTPATIENT
Start: 2021-03-02 | End: 2021-03-08 | Stop reason: HOSPADM

## 2021-03-02 RX ORDER — LISINOPRIL 2.5 MG/1
2.5 TABLET ORAL DAILY
Status: DISCONTINUED | OUTPATIENT
Start: 2021-03-02 | End: 2021-03-08 | Stop reason: HOSPADM

## 2021-03-02 RX ADMIN — LEVETIRACETAM 500 MG: 500 TABLET, FILM COATED ORAL at 21:02

## 2021-03-02 RX ADMIN — DEXAMETHASONE 4 MG: 2 TABLET ORAL at 05:34

## 2021-03-02 RX ADMIN — DEXAMETHASONE 4 MG: 2 TABLET ORAL at 21:02

## 2021-03-02 RX ADMIN — FAMOTIDINE 20 MG: 20 TABLET, FILM COATED ORAL at 21:03

## 2021-03-02 RX ADMIN — AMLODIPINE BESYLATE 10 MG: 10 TABLET ORAL at 08:35

## 2021-03-02 RX ADMIN — DEXAMETHASONE 4 MG: 2 TABLET ORAL at 13:49

## 2021-03-02 RX ADMIN — FLUOXETINE 80 MG: 20 CAPSULE ORAL at 08:35

## 2021-03-02 RX ADMIN — LORAZEPAM 0.5 MG: 0.5 TABLET ORAL at 21:10

## 2021-03-02 RX ADMIN — FAMOTIDINE 20 MG: 20 TABLET, FILM COATED ORAL at 08:35

## 2021-03-02 RX ADMIN — LEVETIRACETAM 500 MG: 500 TABLET, FILM COATED ORAL at 08:34

## 2021-03-02 RX ADMIN — LISINOPRIL 2.5 MG: 2.5 TABLET ORAL at 10:43

## 2021-03-02 RX ADMIN — BUSPIRONE HYDROCHLORIDE 30 MG: 15 TABLET ORAL at 08:34

## 2021-03-02 RX ADMIN — ACETAMINOPHEN 500 MG: 500 TABLET ORAL at 21:10

## 2021-03-02 RX ADMIN — BUSPIRONE HYDROCHLORIDE 30 MG: 15 TABLET ORAL at 21:03

## 2021-03-02 ASSESSMENT — ACTIVITIES OF DAILY LIVING (ADL)
PREVIOUS_RESPONSIBILITIES: MEAL PREP;HOUSEKEEPING;LAUNDRY;SHOPPING;YARDWORK;MEDICATION MANAGEMENT;FINANCES;DRIVING;CHILD CARE;WORK

## 2021-03-02 NOTE — CONSULTS
" 21 1024   Living Arrangements   People in home alone   Able to Return to Prior Arrangements yes   Living Arrangement Comments Home with KAVITA, all living on the main level    Home Safety   Patient Feels Safe Living in Home? yes   CM/SW Discharge Planning   Patient/Family Anticipates Transition to home with family;family members' home;home with help/services   Concerns to be Addressed adjustment to diagnosis/illness   Concerns Comments TEarful, HP consulted    Patient/family educated on Medicare website which has current facility and service quality ratings no   Transportation Anticipated family or friend will provide;car, drives self   Anticipated Discharge Disposition (CM/SW) Home Care;Home;Outpatient Rehab (PT, OT, SLP, Cardiac or Pulmonary)   Patient/Family in Agreement with Plan yes       Social Work: Initial Assessment with Discharge Plan    Patient Name: Olga Bailey  : 1945  Age: 75 year old  MRN: [id number]  Completed assessment with: Chart review and pt interview and pt dtr La Nena on the phone   Admitted to ARU: 2021    Presenting Information   Date of SW assessment: 2021  Health Care Directive: Health Care Directive Agent (if patient not able to make decisions)  Primary Health Care Agent: Patient/self   Secondary Health Care Agent: 3 adult children. Pt wants to fill out a HCD, bebo bring the ppwk.   Living Situation: Single, lives alone in house with 3 kavita, flight to basement (does not need to do but where laundry is located). Walk-in shower. No pets.   Previous Functional Status: Indep with ADLs and IADLs. Per H&P, fast- at baseline and poor balance for several years. SWer at [facility] discussed life line with pt dtr. Managing own meds and finances. Driving. Hx falls, pt denied any in last 6 months. Got knee replaced in 2020 and 'that made a big difference, less pain and more exercise\".   DME available: See therapy evals. Home Cpap.  Patient and family understanding " of hospitalization: Appropriate, some word-finding but pleasant and cooperative. Some confusion, stated it is 2022 and scored 12/15 on BIMS.   Cultural/Language/Spiritual Considerations: 74 y/o woman, single, english-speaking,  and Rastafarian martin.       Physical Health  Reason for admission: 75-year-old woman who initially presented with a several week history of word finding difficulties and difficulty with handwriting.  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  She is now status post a left parietal craniotomy with tumor resection on 2/23/2021.  Pathology of the tumor is pending.  She has been started on Keppra for 6 weeks and is currently on a steroid taper.  Presently she denies any headaches, chest pain, or shortness of breath however she is having a difficult time adjusting to the new diagnosis.  She is agreeable to health psychology consult.  Functionally she was previously independent with ambulation, mobility, and all ADLs.  Presently she is transferring at a standby assist level, ambulating in the zambrano with contact-guard assist, loss of balance episodes noted especially with increasing fatigue.  She is on an NDD 3 diet with thins.  She lives alone in a home with 3 steps to enter, however her daughter is supportive and available to assist as needed.  She will participate in PT, OT, and speech therapy for 60 minutes each daily.  Goals will be for modified independence with ambulation, mobility, and ADLs.  Upgrade diet to regular with thin liquids.  Estimated length of stay 7 days.    Provider Information   Primary Care Physician:Edd, Enrique Swann (Dr VENCES)  [address] [place], [place] [id number]  PH: [phone]  : None reported     Mental Health/Chemical Dependency:   Diagnosis: Depression/anxiety per pt chart. Emotional and tearful, adjusting to diagnosis.  Health psych consulted. Pt dtr reported to [facility] SWer that pt does not do well with 'surprises'.   Alcohol/Tobacco/Narcotis: Very rare alcohol use, no tobacco or illicit drug use reported.   Support/Services in Place: None reported   Services Needed/Recommended: Supportive services available by consult (Health Psychology and Chinmay services)   Sexuality/Intimacy: Not discussed     Support System  Marital Status: /single   Family support: Pt has a daughter La Nena who is RN at [employer]. She has a son living in [facility] and a son in [place]. Grandchildren, 4 (18, 16, 2 little ones).   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  Income Source: Pension and SSI  Education: RN school, not discussed   Financial Concerns:  None reported   Insurance: Pt has Medicare and BC/BS       Discharge Plan   Patient and family discharge goal: Home vs dtr home (if needed) with assist, HC vs OP pending progress   Provided Education on discharge plan: Yes  Patient agreeable to discharge plan:  Yes  Provided education and attained signature for Medicare IM and IRF Patient Rights and Privacy Information provided to patient : YES  Provided patient with [place] [facility] Resources: N/A  Barriers to discharge: None identified at this time     Discharge Recommendations   Disposition: See above   Transportation Needs: Family assistance   Name of Transportation Company and Phone: N/A     Additional comments   ELOS 7 days. Discharge needs pending. Pt appeared A&O and pleasant. Evals today. Health psych consulted for emotional support. Chinmay met with pt after SW assessment. Pt denied any immediate needs or concerns at this time. SW will continue to follow and remain  available as needs arise.     Spke with pt dtr La Nena for about 20 minutes. Discussed discharge planning and team rounds on Friday. La Nena plans to attend. La Nena's house has 3 JOHNSON from garage and 2 JOHNSON from the front door. Stairs inside to second level where bedroom is but La Nena can set up bedroom in office on main level. About 10 STI. Tub and walk-in on second level and walk-in in the basement as well. Family very supportive. Son bringing in home CPAP this evening. La Nena given SW direct contact information.     Please invite to Care Conference:  Pt dtr La Nena     Leeanne Tan, LICCAMRON, CAD-[place]  [facility]   Phone: [phone]  I   Pager: [phone]

## 2021-03-02 NOTE — PROGRESS NOTES
SPIRITUAL HEALTH SERVICES  SPIRITUAL ASSESSMENT Progress Note  [facility] ([facility]) 5R ARU     REFERRAL SOURCE: Consult    Visited with pt Olga who shared she is so fortunate to have all the support she has.  She does have some concerns, but is trying to not get ahead of herself about what she will and won't be able to do. She is very grateful for her long term psychologist and the coping skills she's learned. She said she's usually been the care taker, but now others are caring for her. She said it feels strange, but that it has to be and she has good support. We prayed together.    PLAN: SHS remains available per pt/request.    Johanna Kong  Chaplain Resident  Pager: [phone]

## 2021-03-02 NOTE — PLAN OF CARE
Discharge Planner Post-Acute Rehab SLP:     Discharge Plan: home with assist, and further SLP tx    Precautions: fall    Current Status:  Communication: mild/moderate aphasia for verbal and written expression, reading comprehension  Cognition: not formally assessed 2/2 aphasia  Swallow: on NDD3 diet, thin fluids with goal to advance to regular diet    Assessment: sLP: pt was seen for communication evaluation. Noting mild/moderate aphasia.  Verbal expression/word finding is impaired for more complex tasks , but pt is generally able to functionally express self.  Written expressio is significantly impaired, for spelling/writing at word level, and being unable to generate phrases/sentences as well. auditory comprehension appears WFL but will further monitor.  Reading comprehension mildly impaired at the sentence/paragraph level. Pt is below baseline for communication skills, recommending skilled intervention to improve communication for increased safety and independence to return home.    Other Barriers to Discharge (Family Training, etc): TBD*

## 2021-03-02 NOTE — PLAN OF CARE
FOCUS/GOAL  Medical management    ASSESSMENT, INTERVENTIONS AND CONTINUING PLAN FOR GOAL:  Pt is alert and oriented. Some word finding difficulty noted. No complaints of pain. SBA with GB. Continent of bladder using toilet in the bathroom. Verbalized she had issues initially falling asleep, but stated she was able to get some sleep when asked in AM.

## 2021-03-02 NOTE — PROGRESS NOTES
"   03/02/21 1024   Quick Adds   Type of Visit Initial Occupational Therapy Evaluation   Living Environment   People in home alone   Current Living Arrangements house   Home Accessibility stairs to enter home   Number of Stairs, Main Entrance 3   Stair Railings, Main Entrance railings safe and in good condition   Transportation Anticipated family or friend will provide;car, drives self   Living Environment Comments walk in shower with 2 grab bars and shower chair. higher toilet, does have a sink next to toilet to A with transfer.   Self-Care   Usual Activity Tolerance good   Current Activity Tolerance fair   Regular Exercise Yes   Activity/Exercise Type walking   Exercise Amount/Frequency daily   Equipment Currently Used at Home none  (has grab bars and shower chair)   Activity/Exercise/Self-Care Comment pt. 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 Takes care of grandchildren, amb. 4 mi./day.   Disability/Function   Hearing Difficulty or Deaf no   Patient's preferred means of communication verbal   Wear Glasses or Blind yes   Vision Management glasses   Concentrating, Remembering or Making Decisions Difficulty no   Concentration Management word finding deficits, per pt. \"seems to be getting better.\"   Communication   (word finding )   Difficulty Eating/Swallowing no   Walking or Climbing Stairs Difficulty no   Dressing/Bathing Difficulty no   Toileting issues no   Doing Errands Independently Difficulty (such as shopping) yes   Errands Management family can A prn   Fall history within last six months no   Change in Functional Status Since Onset of Current Illness/Injury yes   General Information   Onset of Illness/Injury or Date of Surgery 03/01/21   Referring Physician Kary Munoz PA   Patient/Family Therapy Goal Statement (OT) to be indep, [place]/annual tradition.   Additional Occupational Profile " Info/Pertinent History of Current Problem Left frontal-parietal mass, S/P L parietal craniotomy and excisional biopsy, resection,   Performance Patterns (Routines, Roles, Habits) below baseline with ADls/mobility   Existing Precautions/Restrictions fall;other (see comments)  (crani precautions)   General Observations and Info activity orders; up with A   Cognitive Status Examination   Orientation Status orientation to person, place and time   Affect/Mental Status (Cognitive) WFL   Follows Commands WFL   Cognitive Status Comments Pt. noting word finding deficits; continue to assess/monitor cogntion prn.   Visual Perception   Visual Impairment/Limitations WFL  (wears glasses.)   Pain Assessment   Patient Currently in Pain No   Range of Motion Comprehensive   Comment, General Range of Motion BUE AROM, slight decrease in R shoulder flexion due to clavicular mass and surgery for removal in past that caused nerve damage.    Strength Comprehensive (MMT)   Comment, General Manual Muscle Testing (MMT) Assessment BUEs grossly WFL 4 to 4+/5;  RUE < LUE.  Bilat  WFL   Coordination   Upper Extremity Coordination Right UE impaired  (improving per pt. since surgery)   ARC Assessment Only   Acute Rehab Functional Assessment See IP Rehab Daily Documentation Flowsheet for Functional Mobility/ADL Assessment   Instrumental Activities of Daily Living (IADL)   Previous Responsibilities meal prep;housekeeping;laundry;shopping;yardwork;medication management;finances;driving;;work   Clinical Impression   Criteria for Skilled Therapeutic Interventions Met (OT) yes   OT Diagnosis impaired ADls/mobility   OT Problem List-Impairments impacting ADL activity tolerance impaired;balance;cognition;mobility;range of motion (ROM);sensation;post-surgical precautions   ADL comments/analysis below baseline with ADls/mobility   Assessment of Occupational Performance 3-5 Performance Deficits   Identified Performance Deficits dressing,  bathing, toileting ,home mgmt.   Planned Therapy Interventions (OT) ADL retraining;IADL retraining;balance training;fine motor coordination training;cognition;strengthening;transfer training;home program guidelines;progressive activity/exercise;motor coordination training   Clinical Decision Making Complexity (OT) low complexity   Therapy Frequency (OT) Daily   Predicted Duration of Therapy ~ 1 week   Anticipated Equipment Needs Upon Discharge (OT)   (TBD)   Risk & Benefits of therapy have been explained evaluation/treatment results reviewed;care plan/treatment goals reviewed;risks/benefits reviewed;current/potential barriers reviewed;participants voiced agreement with care plan;patient   Total Evaluation Time (Minutes)   Total Evaluation Time (Minutes) 20

## 2021-03-02 NOTE — PROGRESS NOTES
"  [facility]   Acute Rehabilitation Unit  Daily progress note    INTERVAL HISTORY  Olga Bailey was seen and examined at bedside.  Reports had many interruptions overnight and hopeful for a more quiet night tonight.  Denies dizziness, headaches, fevers, and overall doing well. Will add prn melatonin for sleep.    Functionally, undergoing therapy evaluations today.       MEDICATIONS    amLODIPine  10 mg Oral Daily     busPIRone HCl  30 mg Oral BID     [START ON 3/9/2021] dexamethasone  2 mg Oral Q8H ELGIN     dexamethasone  4 mg Oral Q8H ELGIN     famotidine  20 mg Oral BID     FLUoxetine  80 mg Oral Daily     levETIRAcetam  500 mg Oral BID        acetaminophen, bisacodyl, docusate sodium, hydrALAZINE, LORazepam, melatonin, senna-docusate, sennosides     PHYSICAL EXAM  BP (!) 152/72 (BP Location: Left arm)   Pulse 58   Temp 97.4  F (36.3  C) (Oral)   Resp 16   Ht 1.6 m (5' 3\")   Wt 73.6 kg (162 lb 3.2 oz)   SpO2 96%   BMI 28.73 kg/m    Gen: awake alert nad  HEENT: mmm  Pulm: non labored on room air  Ext: no le edema  Neuro/MSK: up from bed and ambulating in room with sba, moving quickly with cues for safety from therapy.     LABS  Lab Results   Component Value Date    WBC 15.6 02/28/2021     Lab Results   Component Value Date    RBC 3.47 02/28/2021     Lab Results   Component Value Date    HGB 9.9 02/28/2021     Lab Results   Component Value Date    HCT 30.8 02/28/2021     Lab Results   Component Value Date    MCV 89 02/28/2021     Lab Results   Component Value Date    MCH 28.5 02/28/2021     Lab Results   Component Value Date    MCHC 32.1 02/28/2021     Lab Results   Component Value Date    RDW 13.5 02/28/2021     Lab Results   Component Value Date     02/28/2021     Last Comprehensive Metabolic Panel:  Sodium   Date Value Ref Range Status   02/24/2021 135 133 - 144 mmol/L Final     Potassium   Date Value Ref Range Status   02/24/2021 4.3 3.4 - 5.3 mmol/L Final "     Chloride   Date Value Ref Range Status   02/24/2021 102 94 - 109 mmol/L Final     Carbon Dioxide   Date Value Ref Range Status   02/24/2021 27 20 - 32 mmol/L Final     Anion Gap   Date Value Ref Range Status   02/24/2021 6 3 - 14 mmol/L Final     Glucose   Date Value Ref Range Status   02/24/2021 110 (H) 70 - 99 mg/dL Final     Urea Nitrogen   Date Value Ref Range Status   02/24/2021 28 7 - 30 mg/dL Final     Creatinine   Date Value Ref Range Status   02/24/2021 0.64 0.52 - 1.04 mg/dL Final     GFR Estimate   Date Value Ref Range Status   02/24/2021 87 >60 mL/min/[1.73_m2] Final     Comment:     Non  GFR Calc  Starting 12/18/2018, serum creatinine based estimated GFR (eGFR) will be   calculated using the Chronic Kidney Disease Epidemiology Collaboration   (CKD-EPI) equation.       Calcium   Date Value Ref Range Status   02/24/2021 8.5 8.5 - 10.1 mg/dL Final       Rehabilitation - continue comprehensive acute inpatient rehabilitation program with multidisciplinary approach including therapies, rehab nursing, and physiatry following. See interval history for updates.      ASSESSMENT AND PLAN    Olga Bailey is a 75 year old woman with past medical history of depression, anxiety, asthma, MARCELLE, benign meningioma (2012) who presented with several weeks of word finding/ difficulties expressing self, changes in hand writing MRI revealed left lateral frontoparietal mass with edema she was advised to present to ED was admitted to [facility] 2/17/21 underwent left parietal craniotomy and tumor resection 2/23/21.  Admitted to acute rehab 3/1/21.     left frontal-parietal mass  S/P L parietal craniotomy and excisional biopsy, resection   MRI ordered in clinic found: 3.3 x 2.7 x 2.9 cm intra-axial mass in the lateral left frontoparietal region with  moderate amount of edema w/o midline shift,  unchanged small right occipital meningioma. Presented to ED as directed and was started on IV steroids and seen  "by neurosurgery. CT CAP without signs of malignancy.   - f/u neurosurgery/neuro oncology for pathology  - keppra 500 mg bid x 6 weeks per neurosurgery recs  - decadron 4 mg tid then to 2 mg tid as per neurosurgery recs.   - keep incision clean and dry- plan for staple removal 3/9/21- currently scheduled as outpatient pending duration of ARU stay  -continue PT/OT/SLP  -tylenol prn     Hypertension- BP's elevated during hospitalization, was started on amlodipine 2/21. possibly due to steroids vs underlying benign essential HTN BP 130s-160s   -continue amlodipine  -add low dose lisinopril   -trend BP       Acute anemia- suspected acute blood loss anemia.  hgb 11.7 on admission--> 9.9 2/28 stable  -trend.     Leukocytosis:  suspected to be steroid induced. WBC 15.6 2/28  - Monitor clinically for signs of infection.  - Monitor CBC.     Depression  Anxiety  Follows with psychiatry as outpatient is on Wellbutrin, Buspar, and prozac, Wellbutrin held because of potential to lower seizure threshold. Started on PRN lorazepam this hospitalization.  - Continue buspirone, fluoxetine.   - Continue PRN PO lorazepam 0.5 mg bid prn     Asthma-not on medications prior to admission.   MARCELLE  -continue home cpap- prefers not to wear- \"to stressful while I am here\"        1. Adjustment to disability:  Psychology, .   2. FEN: reg  3. Bowel: continent  4. Bladder: continent  5. DVT Prophylaxis:  ambulation   6. GI Prophylaxis: ppi  7. Code: full  8. Disposition: goal for home  9. ELOS: ~7 days  10. Follow up Appointments on Discharge: neurosurgery, pcp         discussed with Dr. Rosenbaum  PM&R Staff Physician    Kary Munoz PALENY  Physical Medicine & Rehabilitation     "

## 2021-03-02 NOTE — PROGRESS NOTES
A&Ox4. Some word-finding difficulty but pt is able to comprehend and make needs known. Very pleasant and cooperative. Incision on L side of head with staples YOGI, wound cleanser applied. Bruising on arms. Admission tasks complete. DD3, thin diet takes pills whole. Cont B/B in toilet. Transfers SBA with gait belt. Brushes teeth on her own with staff present. States she is having loose stools- senna held.  and 98. Denies pain, SOB, dizziness, N/V, numbness/tingling. BP elevated but systolic remained under 160 (see flow sheets) so no PRN given. Declined bed bath this shift. Pt in bed in lowest position, bed locked, alarms on, call light within reach. Continue POC.

## 2021-03-02 NOTE — PROGRESS NOTES
03/02/21 1700   General Information   Onset of Illness/Injury or Date of Surgery 02/23/21   Referring Physician Dr. lópez   Pertinent History of Current Problem Olga Bailey is a 75 year old woman with past medical history of depression, anxiety, asthma, MARCELLE, benign meningioma (2012) who presented with several weeks of word finding/ difficulties expressing self, changes in hand writing MRI revealed left lateral frontoparietal mass with edema she was advised to present to ED was admitted to [facility] 2/17/21 underwent left parietal craniotomy and tumor resection 2/23/21   General Observations SLP:pt seen during hospitalization for communication and swallowing   Type of Evaluation   Type of Evaluation Speech, Language, Cognition   Auditory Comprehension   Follows Commands (Auditory Comprehension) WFL;multi-step commands;3-step commands   Paragraph Comprehension (Auditory Comprehension) WFL  (7/8)   Functional Assessment Scale (Auditory Comprehension) Minimal Impairment   Multi-Step, Follows Commands (Auditory Comprehension) over 90% accuracy   Verbal Expression   Confrontational Naming (Verbal Expression) pictures   Word Finding Skills (Verbal Expression) generative naming;other (see comments)  (defining words, generating sentences)   Functional Assessment Scale (Verbal Expression) Mild Impairment   Pictures, Confrontational Naming (Verbal Expression)   (10/15 BNT)   Generative Naming, Word Finding (Verbal Expression) impaired  (scored 3, nl - 5.13, defining words 8/10)   Pragmatic Language   Verbal Skills (Pragmatic Language) WNL   Reading Comprehension   Comprehension Level (Reading Comprehension) complex/lengthy information;paragraph level tasks;sentence level tasks  (7/10 sentences, paragraphs)   Functional Assessment Scale (Reading Comprehension) Mild to Moderate Impairment   Complex/Lengthy Information, Comprehension Level (Reading Comprehension) minimal impairment;moderate impairment   Paragraph  Level, Comprehension Level (Reading Comprehension) minimal impairment;moderate impairment   Sentence Level, Comprehension Level (Reading Comprehension) impaired;minimal impairment   Written Language   Written Expression (Written Language) sentence level;word level;single letters/symbols   Functional Assessment Scale (Written Expression) Moderate to Severe Impairment   Comment, Assessment (Written Language) sLP: difficulty spelling words/word finding and generating sentences.    Sentence Level, Written Expression (Written Language) impaired;severe impairment   Word Level, Written Expression (Written Language) moderate impairment;severe impairment   Letters/Symbols, Written Expression (Written Language) minimal impairment   General Therapy Interventions   Planned Therapy Interventions Language   Language Reading comprehension;Verbal expression;Written expression   SLP Therapy Assessment/Plan   Criteria for Skilled Therapeutic Interventions Met (SLP Eval) yes;treatment indicated   SLP Diagnosis SLP: mild moderate aphasia/language deficits   Rehab Potential (SLP Eval) good, to achieve stated therapy goals   Therapy Frequency (SLP Eval) daily   Predicted Duration of Therapy Intervention (SLP Eval) estimated 7-10 days   Comment, Therapy Assessment/Plan (SLP) sLP: pt was seen for communication evaluation. Noting mild/moderate aphasia.  Verbal expression/word finding is impaired for more complex tasks , but pt is generally able to functionally express self.  Written expressio is significantly impaired, for spelling/writing at word level, and being unable to generate phrases/sentences as well. auditory comprehension appears WFL but will further monitor.  Reading comprehension mildly impaired at the sentence/paragraph level. Pt is below baseline for communication skills, recommending skilled intervention to improve communication for increased safety and independence to return home   Therapy Plan Review/Discharge Plan (SLP)    Therapy Plan Review (SLP) evaluation/treatment results reviewed;care plan/treatment goals reviewed;participants voiced agreement with care plan;participants included;patient    Total Evaluation Time   Total Evaluation Time (Minutes) 55

## 2021-03-02 NOTE — PROGRESS NOTES
Discharge Planner Post-Acute Rehab PT:   L frontoparietal mass resection  Discharge Plan: home with dtr assist vs to dtr home with 24/7 assist OPPT    Precautions: fall, R neglect-mild, needs cues to rest    Current Status:  Bed Mobility: I  Transfer: SBA  Gait: ' no AD  Stairs: 12 reciprocal pattern, close SBA one rail.   Balance: 19/30 FGA, impaired sensation/proprioception    Assessment: pt is a pleasant 75 year old female who presented with decreased I with functional mobility. Pt is at a moderate risk for falling due to balance, proprioception and attention deficits. Pt requires Gerard for safety with higher level balance challenges. Pt also fatigues quickly. Pt will benefit from skilled PT intervention in order to reduce listed deficits.     Other Barriers to Discharge (DME, Family Training, etc): potential need for family training.

## 2021-03-02 NOTE — PROGRESS NOTES
03/02/21 1700   Signing Clinician's Name / Credentials   Signing clinician's name / credentials Estefania Page DPT   Functional Gait Assessment (DK Denton, QUAN Mantilla, et al. (2004))   1. GAIT LEVEL SURFACE 2   2. CHANGE IN GAIT SPEED 2   3. GAIT WITH HORIZONTAL HEAD TURNS 2   4. GAIT WITH VERTICAL HEAD TURNS 3   5. GAIT AND PIVOT TURN 2   6. STEP OVER OBSTACLE 2   7. GAIT WITH NARROW BASE OF SUPPORT 1   8. GAIT WITH EYES CLOSED 1   9. AMBULATING BACKWARDS 2   10. STEPS 2   Total Functional Gait Assessment Score   TOTAL SCORE: (MAXIMUM SCORE 30) 19   Functional Gait Assessment (FGA): The FGA assesses postural stability during various walking tasks.   Gait assistive device used: None    Patient Score: 19/30  Scores of <22/30 have been correlated with predicting falls in community-dwelling older adults according to Bertin & Cooper 2010.   Scores of <18/30 have been correlated with increased risk for falls in patients with Parkinsons Disease according to Mahesh Riggs, Uriostegui et al 2014.  Minimal Detectable Change for patients with acute/chronic stroke = 4.2 according to Thianastasiia & Ritschel 2009  Minimal Detectable Change for patients with vestibular disorder = 8 according to Bertin & Cooper 2010    Assessment (rationale for performing, application to patient s function & care plan): determining level of fall risk and ability to be I at this time.   Minutes billed as physical performance test:0

## 2021-03-02 NOTE — PROGRESS NOTES
03/02/21 1045   Quick Adds   Type of Visit Initial PT Evaluation   Living Environment   People in home alone   Current Living Arrangements house   Home Accessibility stairs to enter home   Number of Stairs, Main Entrance 3   Stair Railings, Main Entrance railing on left side (ascending)   Transportation Anticipated family or friend will provide;car, drives self   Living Environment Comments Pt reports stairs to basement which she doesn't need to do, all needs met on main level. pt reports if needed she can DC to dtrs home (several steps up to bedroom she would use, one railing present; dtrs family available to assist 24/7   Self-Care   Usual Activity Tolerance good   Current Activity Tolerance moderate   Regular Exercise Yes   Activity/Exercise Type walking   Exercise Amount/Frequency daily   Equipment Currently Used at Home none  (does have a FWW from recent TKA)   Activity/Exercise/Self-Care Comment PLOF: pt I with all functional mobility prior to tumor resection. 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 Enjoys reading and needlepoint. No pets, but would like to get a dog. Does have a hx of falls, unknown reason for falls, has work up for falls, unable to determine why. States shed just find herself on the floor.    Disability/Function   Walking or Climbing Stairs Difficulty no   Dressing/Bathing Difficulty no   Fall history within last six months no   General Information   Onset of Illness/Injury or Date of Surgery 02/23/21   Referring Physician Jl Rosenbaum MD   Patient/Family Therapy Goals Statement (PT) to continue walking to control/balance in walking.    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 underwent L parietal craniotomy and resection.    Existing Precautions/Restrictions fall   General Observations L sided incision across scalp   Cognition   Orientation Status (Cognition) oriented x 4  (took a while to state date)   Cognitive Status Comments Demonstrates word finding issues, difficulty processing; R/L confusion. When completing vision assessment and pt asked to follow PT finger pt reports she understands, but does not follow finger, PT sat pt down and completed with following pen tip, pt then successful.    Range of Motion (ROM)   ROM Comment severe reduction in ROM at B great toes L>R; All other joints WFL   Strength Comprehensive (MMT)   Comment, General Manual Muscle Testing (MMT) Assessment R hip extensor weakness, B hip abductor weakness 3+/5; all others WFL   ARC Assessment Only   Acute Rehab Functional Assessment See IP Rehab Daily Documentation Flowsheet for Functional Mobility/ADL Assessment   Sensory Examination   Sensory Perception Comments Impaired proprioception at R great toe, though difficulty to assess due to cognitive/aphasia deficits. Does demonstrate functional proprioception deficits in R LE gloablly   Coordination   Coordination Comments impaired related to proprioception, no tremor or dysmetrias noted with eyes open   Muscle Tone   Muscle Tone Comments 2 beats clonus R ankle, possible hypertonicity in R knee, but may be related to recent TKA   Clinical Impression   Criteria for Skilled Therapeutic Intervention yes, treatment indicated   PT Diagnosis (PT) Difficulty with gait   Influenced by the following impairments decreased balance, activity tolerance, impaired proprioception, L sided inattention.    Functional limitations due to impairments Difficulty with gait   Clinical Presentation Evolving/Changing   Clinical Presentation Rationale SBA with most activities, Gerard on uneven surfaces and with dual tasking.    Clinical Decision Making (Complexity) moderate  complexity   Therapy Frequency (PT) Daily   Predicted Duration of Therapy Intervention (days/wks) 1 week   Planned Therapy Interventions (PT) balance training;bed mobility training;gait training;home exercise program;motor coordination training;neuromuscular re-education;patient/family education;postural re-education;ROM (range of motion);stair training;strengthening;stretching;transfer training   Risk & Benefits of therapy have been explained evaluation/treatment results reviewed;care plan/treatment goals reviewed;risks/benefits reviewed;current/potential barriers reviewed;participants voiced agreement with care plan;participants included;patient   Clinical Impression Comments Pt is a 75 year old female who is at a moderate risk for falling due to balance, proprioception and attention deficits. Pt requires Gerard for safety with higher level balance challenges. Pt also fatigues quickly. Pt will benefit from skilled PT intervention in order to reduce listed deficits.    Total Evaluation Time   Total Evaluation Time (Minutes) 20

## 2021-03-02 NOTE — PLAN OF CARE
Discharge Planner Post-Acute Rehab OT:     Discharge Plan: home; to dtr.s home initially    Precautions: post op crani precautions    Current Status:  ADLs: SBA/I with UB/LB dressing, Osmany/S for toileting, S standing at sink for g/h, CGA amb. without AD throughout room/bathroom/hallway.  IADLs: not yet tested  Vision/Cognition: not yet formally tested. Wears glasses no noted visual deficits, word finding deficits noted.    Assessment: edward cain, tx. initiated. pt. CGA/S with BADls and functional mobility, amb. Without AD.  /78, HR 84 after activity    Other Barriers to Discharge (DME, Family Training, etc): TBD Pt. has walk in shower, shower chair and grab bars

## 2021-03-02 NOTE — PROGRESS NOTES
FOCUS/GOAL  Bowel management, Bladder management, Mobility, Safety management and Adjustment to lifestyle change    ASSESSMENT, INTERVENTIONS AND CONTINUING PLAN FOR GOAL:  Patient is alert and oriented x 4 and is able to make needs known by using call light and verbalizing needs.  Is forgetful and has word finding difficulty but with extra time is able to communicate well.  Patient was tearful this AM talking about new diagnosis and health situation, writer gave 1:1 and support.  Continent of bowel and bladder using toilet in bathroom, soft stool and patient declined senna, senna changed to PRN.  Lisinopril added and initial dose was given.  Denies pain.  Is CGA with gait belt.  Is able to move in bed on own.  Alarms in use for safety.

## 2021-03-02 NOTE — PHARMACY-MEDICATION REGIMEN REVIEW
Pharmacy Medication Regimen Review  Olga Bailey is a 75 year old female who is currently in the Acute Rehab Unit.    Assessment: All medications have an appropriate indications, durations and no unnecessary use was found.  Levetiracetam for 6 weeks (started on 2/23)  Dexamethasone: on taper    Plan:   Continue current treatment.  Please continue to wean off prn Lorazepam.  Attending provider will be sent this note for review.  If there are any emergent issues noted above, pharmacist will contact provider directly by phone.      Pharmacy will periodically review the resident's medication regimen for any PRN medications not administered in > 72 hours and discontinue them. The pharmacist will discuss gradual dose reductions of psychopharmacologic medications with interdisciplinary team on a regular basis.    Please contact pharmacy if the above does not answer specific medication questions/concerns.    Background:  A pharmacist has reviewed all medications and pertinent medical history today.  Medications were reviewed for appropriate use and any irregularities found are listed with recommendations.      Current Facility-Administered Medications:      acetaminophen (TYLENOL) tablet 500-1,000 mg, 500-1,000 mg, Oral, Q8H PRN, Kary Munoz PA, 500 mg at 03/01/21 2214     amLODIPine (NORVASC) tablet 10 mg, 10 mg, Oral, Daily, Kary Munoz PA, 10 mg at 03/02/21 0835     bisacodyl (DULCOLAX) Suppository 10 mg, 10 mg, Rectal, Daily PRN, Kary Munoz PA     busPIRone (BUSPAR) tablet 30 mg, 30 mg, Oral, BID, Kary Munoz PA, 30 mg at 03/02/21 0834     [START ON 3/9/2021] dexamethasone (DECADRON) tablet 2 mg, 2 mg, Oral, Q8H ELGINAlexander Lindsay B, PA     dexamethasone (DECADRON) tablet 4 mg, 4 mg, Oral, Q8H [facility], 4 mg at 03/02/21 0534     docusate sodium (COLACE) capsule 100 mg, 100 mg, Oral, BID PRN, Kary Munoz PA     famotidine (PEPCID) tablet 20 mg, 20 mg,  Oral, BID, Kary Munoz PA, 20 mg at 03/02/21 0835     FLUoxetine (PROzac) capsule 80 mg, 80 mg, Oral, Daily, Kary Munoz PA, 80 mg at 03/02/21 0835     hydrALAZINE (APRESOLINE) tablet 10 mg, 10 mg, Oral, 4x Daily PRN, Jl Rosenbaum MD     levETIRAcetam (KEPPRA) tablet 500 mg, 500 mg, Oral, BID, Kary Munoz PA, 500 mg at 03/02/21 0834     lisinopril (ZESTRIL) tablet 2.5 mg, 2.5 mg, Oral, Daily, Kary Munoz PA     LORazepam (ATIVAN) tablet 0.5 mg, 0.5 mg, Oral, BID PRN, Kary Munoz PA, 0.5 mg at 03/01/21 2214     melatonin tablet 1 mg, 1 mg, Oral, QPM PRN, Kary Munoz PA     senna-docusate (SENOKOT-S/PERICOLACE) 8.6-50 MG per tablet 1 tablet, 1 tablet, Oral, At Bedtime PRN, Kary Munoz PA     sennosides (SENOKOT) tablet 2 tablet, 2 tablet, Oral, BID PRN, Kary Munoz PA  No current outpatient prescriptions on file.   PMH: Left frontal-parietal mass, S/P L parietal craniotomy and excisional biopsy, resection, HTN, Acute anemia, Leukocytosis, Depression/Anxiety, Asthma, and MARCELLE

## 2021-03-03 ENCOUNTER — APPOINTMENT (OUTPATIENT)
Dept: SPEECH THERAPY | Facility: CLINIC | Age: 76
End: 2021-03-03
Payer: MEDICARE

## 2021-03-03 ENCOUNTER — APPOINTMENT (OUTPATIENT)
Dept: OCCUPATIONAL THERAPY | Facility: CLINIC | Age: 76
End: 2021-03-03
Payer: MEDICARE

## 2021-03-03 ENCOUNTER — APPOINTMENT (OUTPATIENT)
Dept: PHYSICAL THERAPY | Facility: CLINIC | Age: 76
End: 2021-03-03
Payer: MEDICARE

## 2021-03-03 PROCEDURE — 97110 THERAPEUTIC EXERCISES: CPT | Mod: GO | Performed by: OCCUPATIONAL THERAPIST

## 2021-03-03 PROCEDURE — 97535 SELF CARE MNGMENT TRAINING: CPT | Mod: GO | Performed by: OCCUPATIONAL THERAPIST

## 2021-03-03 PROCEDURE — 250N000012 HC RX MED GY IP 250 OP 636 PS 637: Performed by: PHYSICIAN ASSISTANT

## 2021-03-03 PROCEDURE — 92610 EVALUATE SWALLOWING FUNCTION: CPT | Mod: GN | Performed by: SPEECH-LANGUAGE PATHOLOGIST

## 2021-03-03 PROCEDURE — 128N000003 HC R&B REHAB

## 2021-03-03 PROCEDURE — 97112 NEUROMUSCULAR REEDUCATION: CPT | Mod: GP | Performed by: STUDENT IN AN ORGANIZED HEALTH CARE EDUCATION/TRAINING PROGRAM

## 2021-03-03 PROCEDURE — 250N000013 HC RX MED GY IP 250 OP 250 PS 637: Performed by: PHYSICIAN ASSISTANT

## 2021-03-03 PROCEDURE — 97530 THERAPEUTIC ACTIVITIES: CPT | Mod: GO | Performed by: OCCUPATIONAL THERAPIST

## 2021-03-03 PROCEDURE — 97116 GAIT TRAINING THERAPY: CPT | Mod: GP | Performed by: STUDENT IN AN ORGANIZED HEALTH CARE EDUCATION/TRAINING PROGRAM

## 2021-03-03 PROCEDURE — 99232 SBSQ HOSP IP/OBS MODERATE 35: CPT | Performed by: PHYSICAL MEDICINE & REHABILITATION

## 2021-03-03 PROCEDURE — 97530 THERAPEUTIC ACTIVITIES: CPT | Mod: GP | Performed by: STUDENT IN AN ORGANIZED HEALTH CARE EDUCATION/TRAINING PROGRAM

## 2021-03-03 RX ORDER — OXYMETAZOLINE HYDROCHLORIDE 0.05 G/100ML
2 SPRAY NASAL DAILY PRN
Status: DISCONTINUED | OUTPATIENT
Start: 2021-03-03 | End: 2021-03-08 | Stop reason: HOSPADM

## 2021-03-03 RX ADMIN — FLUOXETINE 80 MG: 20 CAPSULE ORAL at 08:53

## 2021-03-03 RX ADMIN — BUSPIRONE HYDROCHLORIDE 30 MG: 15 TABLET ORAL at 08:53

## 2021-03-03 RX ADMIN — BUSPIRONE HYDROCHLORIDE 30 MG: 15 TABLET ORAL at 21:06

## 2021-03-03 RX ADMIN — DEXAMETHASONE 4 MG: 2 TABLET ORAL at 21:05

## 2021-03-03 RX ADMIN — LORAZEPAM 0.5 MG: 0.5 TABLET ORAL at 21:06

## 2021-03-03 RX ADMIN — DEXAMETHASONE 4 MG: 2 TABLET ORAL at 06:18

## 2021-03-03 RX ADMIN — LEVETIRACETAM 500 MG: 500 TABLET, FILM COATED ORAL at 08:53

## 2021-03-03 RX ADMIN — DEXAMETHASONE 4 MG: 2 TABLET ORAL at 14:53

## 2021-03-03 RX ADMIN — LEVETIRACETAM 500 MG: 500 TABLET, FILM COATED ORAL at 21:06

## 2021-03-03 RX ADMIN — FAMOTIDINE 20 MG: 20 TABLET, FILM COATED ORAL at 21:05

## 2021-03-03 RX ADMIN — LISINOPRIL 2.5 MG: 2.5 TABLET ORAL at 08:53

## 2021-03-03 RX ADMIN — FAMOTIDINE 20 MG: 20 TABLET, FILM COATED ORAL at 08:53

## 2021-03-03 RX ADMIN — ACETAMINOPHEN 500 MG: 500 TABLET ORAL at 21:06

## 2021-03-03 RX ADMIN — AMLODIPINE BESYLATE 10 MG: 10 TABLET ORAL at 08:53

## 2021-03-03 ASSESSMENT — MIFFLIN-ST. JEOR: SCORE: 1163.58

## 2021-03-03 NOTE — PROGRESS NOTES
"  [facility]   Acute Rehabilitation Unit  Daily progress note    INTERVAL HISTORY  Olga Bailye was seen sitting up in bed packing up CPAP reports night was much better, feels more rested.  Did wake in early am and was confused as to location and thought it was time to get dressed for work, reoriented to place/situation.  She denies headaches, dizziness, fevers, n/v/d.  Is having intermittent epistaxis, which resolves in ~ 10 minutes- not on any blood thinning agents, is on steroids, prior PLT level WNL will repeat CBC in am.  PRN afrin ordered if recurrent.  She is doing well with therapy she is hopeful that will feel \"more ready\" for discharge as it approaches.      OT: ADLs: SBA/I with UB/LB dressing, Osmany/S for toileting, S standing at sink for g/h, CGA amb. without AD throughout room/bathroom/hallway.  IADLs: not yet tested  Vision/Cognition: not yet formally tested. Wears glasses no noted visual deficits, word finding deficits noted.    PT: is a pleasant 75 year old female who presented with decreased I with functional mobility. Pt is at a moderate risk for falling due to balance, proprioception and attention deficits. Pt requires Gerard for safety with higher level balance challenges. Pt also fatigues quickly. Pt will benefit from skilled PT intervention in order to reduce listed deficits.     SLP: Communication: mild/moderate aphasia for verbal and written expression, reading comprehension. Cognition: not formally assessed 2/2 aphasia  Swallow: on NDD3 diet, thin fluids with goal to advance to regular diet    MEDICATIONS    amLODIPine  10 mg Oral Daily     busPIRone HCl  30 mg Oral BID     [START ON 3/9/2021] dexamethasone  2 mg Oral Q8H ELGIN     dexamethasone  4 mg Oral Q8H ELGIN     famotidine  20 mg Oral BID     FLUoxetine  80 mg Oral Daily     levETIRAcetam  500 mg Oral BID     lisinopril  2.5 mg Oral Daily        acetaminophen, bisacodyl, calcium carbonate, docusate " "sodium, hydrALAZINE, LORazepam, melatonin, senna-docusate, sennosides     PHYSICAL EXAM  /74 (BP Location: Right arm)   Pulse 73   Temp 97.4  F (36.3  C) (Oral)   Resp 16   Ht 1.6 m (5' 3\")   Wt 69.9 kg (154 lb 3.2 oz)   SpO2 97%   BMI 27.32 kg/m    Gen: awake alert nad  HEENT: mmm, dried blood right hernández  CV: rrr  Pulm: non labored clear on room air  Ext: no le edema  Neuro/MSK: awake alert moves all extremities:  Skin: incision cdi without drainage, significant though fading ecchymosis down neck     LABS  Lab Results   Component Value Date    WBC 15.6 02/28/2021     Lab Results   Component Value Date    RBC 3.47 02/28/2021     Lab Results   Component Value Date    HGB 9.9 02/28/2021     Lab Results   Component Value Date    HCT 30.8 02/28/2021     Lab Results   Component Value Date    MCV 89 02/28/2021     Lab Results   Component Value Date    MCH 28.5 02/28/2021     Lab Results   Component Value Date    MCHC 32.1 02/28/2021     Lab Results   Component Value Date    RDW 13.5 02/28/2021     Lab Results   Component Value Date     02/28/2021     Last Comprehensive Metabolic Panel:  Sodium   Date Value Ref Range Status   02/24/2021 135 133 - 144 mmol/L Final     Potassium   Date Value Ref Range Status   02/24/2021 4.3 3.4 - 5.3 mmol/L Final     Chloride   Date Value Ref Range Status   02/24/2021 102 94 - 109 mmol/L Final     Carbon Dioxide   Date Value Ref Range Status   02/24/2021 27 20 - 32 mmol/L Final     Anion Gap   Date Value Ref Range Status   02/24/2021 6 3 - 14 mmol/L Final     Glucose   Date Value Ref Range Status   02/24/2021 110 (H) 70 - 99 mg/dL Final     Urea Nitrogen   Date Value Ref Range Status   02/24/2021 28 7 - 30 mg/dL Final     Creatinine   Date Value Ref Range Status   02/24/2021 0.64 0.52 - 1.04 mg/dL Final     GFR Estimate   Date Value Ref Range Status   02/24/2021 87 >60 mL/min/[1.73_m2] Final     Comment:     Non  GFR Calc  Starting 12/18/2018, serum " creatinine based estimated GFR (eGFR) will be   calculated using the Chronic Kidney Disease Epidemiology Collaboration   (CKD-EPI) equation.       Calcium   Date Value Ref Range Status   02/24/2021 8.5 8.5 - 10.1 mg/dL Final       Rehabilitation - continue comprehensive acute inpatient rehabilitation program with multidisciplinary approach including therapies, rehab nursing, and physiatry following. See interval history for updates.      ASSESSMENT AND PLAN    Olga Bailey is a 75 year old woman with past medical history of depression, anxiety, asthma, MARCELLE, benign meningioma (2012) who presented with several weeks of word finding/ difficulties expressing self, changes in hand writing MRI revealed left lateral frontoparietal mass with edema she was advised to present to ED was admitted to [facility] 2/17/21 underwent left parietal craniotomy and tumor resection 2/23/21.  Admitted to acute rehab 3/1/21.     left frontal-parietal mass  S/P L parietal craniotomy and excisional biopsy, resection   MRI ordered in clinic found: 3.3 x 2.7 x 2.9 cm intra-axial mass in the lateral left frontoparietal region with  moderate amount of edema w/o midline shift,  unchanged small right occipital meningioma. Presented to ED as directed and was started on IV steroids and seen by neurosurgery. CT CAP without signs of malignancy.   - f/u neurosurgery/neuro oncology for pathology  - keppra 500 mg bid x 6 weeks per neurosurgery recs  - decadron 4 mg tid then to 2 mg tid as per neurosurgery recs.   - keep incision clean and dry- plan for staple removal 3/9/21- currently scheduled as outpatient pending duration of ARU stay  -continue PT/OT/SLP  -tylenol prn     Hypertension- BP's elevated during hospitalization, was started on amlodipine 2/21. possibly due to steroids vs underlying benign essential HTN BP 130s-140s   -continue amlodipine, and low dose lisinopril (started 3/2)  -trend BP       Acute anemia- suspected acute blood loss  anemia.  hgb 11.7 on admission--> 9.9 2/28 stable  -trend in am.     Epistaxis- intermittent epistaxis which resolves ~ 10 minutes has had multiple since surgery.  CBC has been stable  -trend cbc  -afrin prn nose bleed     Leukocytosis:  suspected to be steroid induced. WBC 15.6 2/28  - Monitor clinically for signs of infection.  - cbc in am no signs or symptoms of infection     Depression  Anxiety  Follows with psychiatry as outpatient is on Wellbutrin, Buspar, and prozac, Wellbutrin held because of potential to lower seizure threshold. Started on PRN lorazepam this hospitalization.  - Continue buspirone, fluoxetine.   - Continue PRN PO lorazepam 0.5 mg bid prn     Asthma-not on medications prior to admission, no sob, lung sounds clear to auscultation   MARCELLE  -continue home cpap      1. Adjustment to disability:  Psychology, .   2. FEN: reg  3. Bowel: continent  4. Bladder: continent  5. DVT Prophylaxis:  ambulation   6. GI Prophylaxis: ppi  7. Code: full  8. Disposition: goal for home  9. ELOS: ~7 days  10. Follow up Appointments on Discharge: neurosurgery, pcp       seen & discussed with Dr. Rosenbaum  PM&R Staff Physician    Kary Munoz PA-C  Physical Medicine & Rehabilitation

## 2021-03-03 NOTE — PLAN OF CARE
Discharge Planner Post-Acute Rehab PT:     L frontoparietal mass resection  Discharge Plan: home with dtr assist vs to dtr home with 24/7 assist OPPT     Precautions: fall, R neglect-mild, needs cues to rest     Current Status:  Bed Mobility: I  Transfer: SBA  Gait: ' no A.D also working with SEC d/t impaired balance increasing with fatigue  Stairs: 12 reciprocal pattern, close SBA one rail.   Balance: 19/30 FGA, impaired sensation/proprioception     Assessment:  Pt is at a moderate risk for falling due to balance, proprioception and attention deficits. CGA for safety with higher level balance challenges, improved from MIN A yesterday. Pt also fatigues quickly, and is unaware when fatigued.     Other Barriers to Discharge (DME, Family Training, etc): potential need for family training.

## 2021-03-03 NOTE — PROGRESS NOTES
A&Ox3. Denies pain, SOB, dizziness, N/V, numb/tingling. States loose stools are getting better. LBM today. Cont of B/B in toilet. SBA with gait belt. Cooperative with cares. Eats meals independently. Word-finding difficulty but pt is able to communicate needs. DD3, thin diet. Continue POC.

## 2021-03-03 NOTE — PROGRESS NOTES
Discharge Planner Post-Acute Rehab OT:      Discharge Plan: home; to dtr.s home initially     Precautions: post op crani precautions, falls, right sided inattention     Current Status:  ADLs: SBA with UB/LB dressing, SBA for toileting, SBA/min A standing at sink for g/h-assistance d/t surgical incision, SBA/CGA amb. without AD throughout room/bathroom/hallway.  IADLs: not yet tested. May require supervision initially.   Vision/Cognition: not yet formally tested. Wears glasses. Right sided inattention. word finding deficits noted.     Assessment: Pt participated well during OT session. Noting impaired sequencing and right sided inattention skills during ADLs. Pt required CGA with shower transfer and assistance to wash/rinse/dry 2/10 body parts seated. Recommending ongoing OT services to maximize IND and safety with ADLs/IADLs and functional mobility, and further assess cognition.      Other Barriers to Discharge (DME, Family Training, etc): TBD Pt. has walk in shower, shower chair and grab bars

## 2021-03-03 NOTE — PLAN OF CARE
FOCUS/GOAL  Bladder management, Pain management, Mobility, and Safety management    ASSESSMENT, INTERVENTIONS AND CONTINUING PLAN FOR GOAL:  Patient has word finding difficulty. Alert and oriented x 4. Able to make her needs known. Denied pain early in the shift. Continent with both bladder and bowel. SBA for transfers with no assistive device. PRN Tylenol and Ativan given with hs meds per request for headache and sleep aid. Bed alarm in place.

## 2021-03-03 NOTE — PLAN OF CARE
FOCUS/GOAL  Medical management    ASSESSMENT, INTERVENTIONS AND CONTINUING PLAN FOR GOAL:  Patient has appeared to be sleeping well during overnight rounds with CPAP in place. Patient repositions herself in bed without difficulty. She denies difficulty with pain or discomfort. She has word finding difficulties, but is able to express her needs.  No specific nursing needs overnight. Staff to continue with plan of care.

## 2021-03-03 NOTE — PLAN OF CARE
Discharge Planner Post-Acute Rehab SLP:      Discharge Plan: home with assist, and further SLP tx     Precautions: fall     Current Status:  Communication: mild/moderate aphasia for verbal and written expression, reading comprehension  Cognition: not formally assessed 2/2 aphasia  Swallow: on NDD3 diet, thin fluids with goal to advance to regular diet     Assessment: Completed clinical swallow eval per MD. Pt presents with mild oral pharygneal dysphagia characterized by mildly slower oral prep with DD3 and regular solids. Pt is demonsrating adequate oral clearance with both - but does  have some intermittent mild throat clearing and coughing with regular solids vs DD3 solids- suspect some pharyngeal weakness resulting in pharyngeal retention. There were no aspiration s/s on thin liquids by cup rim or by straw. Recommend continue with current DD3 with thin liquids. Upright for all po small bites/sips, alternate solids and liquids, hard swallow , pace self- eat slowly.      Other Barriers to Discharge (Family Training, etc): TBD*

## 2021-03-03 NOTE — PROGRESS NOTES
Per ned, anticipate discharge on Tuesday 03/09 with OP therapy. SWer aware that dtr [facility] planning to attend [facility] on Friday. SWer called [place] to give heads up on discharge date and recommendations and that team will discuss details further on Friday. Pt dtr asked about family training as she has some time off coming up. SWer coordinated family training on Monday 03/08 from 9am-11am. Therapy office notified and primary therapist notified as well. Pt dtr has copies of POA and HCD and will bring on Friday to be scanned into pt chart. SW will remain available as needs arise.     Leeanne Tan, OPAL, [facility] Acute Rehab Unit   Phone: [phone]  I   Pager: [phone]

## 2021-03-04 ENCOUNTER — APPOINTMENT (OUTPATIENT)
Dept: OCCUPATIONAL THERAPY | Facility: CLINIC | Age: 76
End: 2021-03-04
Payer: MEDICARE

## 2021-03-04 ENCOUNTER — APPOINTMENT (OUTPATIENT)
Dept: PHYSICAL THERAPY | Facility: CLINIC | Age: 76
End: 2021-03-04
Payer: MEDICARE

## 2021-03-04 ENCOUNTER — APPOINTMENT (OUTPATIENT)
Dept: SPEECH THERAPY | Facility: CLINIC | Age: 76
End: 2021-03-04
Payer: MEDICARE

## 2021-03-04 ENCOUNTER — TELEPHONE (OUTPATIENT)
Dept: NEUROSURGERY | Facility: CLINIC | Age: 76
End: 2021-03-04

## 2021-03-04 LAB
ANION GAP SERPL CALCULATED.3IONS-SCNC: 7 MMOL/L (ref 3–14)
BUN SERPL-MCNC: 32 MG/DL (ref 7–30)
CALCIUM SERPL-MCNC: 8 MG/DL (ref 8.5–10.1)
CHLORIDE SERPL-SCNC: 104 MMOL/L (ref 94–109)
CO2 SERPL-SCNC: 24 MMOL/L (ref 20–32)
CREAT SERPL-MCNC: 0.62 MG/DL (ref 0.52–1.04)
ERYTHROCYTE [DISTWIDTH] IN BLOOD BY AUTOMATED COUNT: 13.8 % (ref 10–15)
GFR SERPL CREATININE-BSD FRML MDRD: 88 ML/MIN/{1.73_M2}
GLUCOSE SERPL-MCNC: 111 MG/DL (ref 70–99)
HCT VFR BLD AUTO: 32.6 % (ref 35–47)
HGB BLD-MCNC: 10.6 G/DL (ref 11.7–15.7)
MCH RBC QN AUTO: 29.5 PG (ref 26.5–33)
MCHC RBC AUTO-ENTMCNC: 32.5 G/DL (ref 31.5–36.5)
MCV RBC AUTO: 91 FL (ref 78–100)
PLATELET # BLD AUTO: 273 10E9/L (ref 150–450)
POTASSIUM SERPL-SCNC: 4.3 MMOL/L (ref 3.4–5.3)
RBC # BLD AUTO: 3.59 10E12/L (ref 3.8–5.2)
SODIUM SERPL-SCNC: 135 MMOL/L (ref 133–144)
WBC # BLD AUTO: 18.4 10E9/L (ref 4–11)

## 2021-03-04 PROCEDURE — 85027 COMPLETE CBC AUTOMATED: CPT | Performed by: PHYSICIAN ASSISTANT

## 2021-03-04 PROCEDURE — 97112 NEUROMUSCULAR REEDUCATION: CPT | Mod: GP

## 2021-03-04 PROCEDURE — 92507 TX SP LANG VOICE COMM INDIV: CPT | Mod: GN | Performed by: SPEECH-LANGUAGE PATHOLOGIST

## 2021-03-04 PROCEDURE — 250N000012 HC RX MED GY IP 250 OP 636 PS 637: Performed by: PHYSICIAN ASSISTANT

## 2021-03-04 PROCEDURE — 92526 ORAL FUNCTION THERAPY: CPT | Mod: GN

## 2021-03-04 PROCEDURE — 250N000013 HC RX MED GY IP 250 OP 250 PS 637: Performed by: PHYSICIAN ASSISTANT

## 2021-03-04 PROCEDURE — 99232 SBSQ HOSP IP/OBS MODERATE 35: CPT | Performed by: PHYSICAL MEDICINE & REHABILITATION

## 2021-03-04 PROCEDURE — 97535 SELF CARE MNGMENT TRAINING: CPT | Mod: GO | Performed by: OCCUPATIONAL THERAPIST

## 2021-03-04 PROCEDURE — 80048 BASIC METABOLIC PNL TOTAL CA: CPT | Performed by: PHYSICIAN ASSISTANT

## 2021-03-04 PROCEDURE — 128N000003 HC R&B REHAB

## 2021-03-04 PROCEDURE — 36415 COLL VENOUS BLD VENIPUNCTURE: CPT | Performed by: PHYSICIAN ASSISTANT

## 2021-03-04 RX ORDER — HYDROXYZINE HYDROCHLORIDE 25 MG/1
50 TABLET, FILM COATED ORAL EVERY 6 HOURS PRN
Status: DISCONTINUED | OUTPATIENT
Start: 2021-03-04 | End: 2021-03-08 | Stop reason: HOSPADM

## 2021-03-04 RX ORDER — HYDROXYZINE HYDROCHLORIDE 25 MG/1
25-50 TABLET, FILM COATED ORAL EVERY 6 HOURS PRN
Status: DISCONTINUED | OUTPATIENT
Start: 2021-03-04 | End: 2021-03-08 | Stop reason: HOSPADM

## 2021-03-04 RX ADMIN — Medication 1 MG: at 21:20

## 2021-03-04 RX ADMIN — BUSPIRONE HYDROCHLORIDE 30 MG: 15 TABLET ORAL at 09:32

## 2021-03-04 RX ADMIN — DEXAMETHASONE 4 MG: 2 TABLET ORAL at 13:28

## 2021-03-04 RX ADMIN — AMLODIPINE BESYLATE 10 MG: 10 TABLET ORAL at 09:33

## 2021-03-04 RX ADMIN — LORAZEPAM 0.5 MG: 0.5 TABLET ORAL at 21:20

## 2021-03-04 RX ADMIN — LISINOPRIL 2.5 MG: 2.5 TABLET ORAL at 09:33

## 2021-03-04 RX ADMIN — FAMOTIDINE 20 MG: 20 TABLET, FILM COATED ORAL at 21:19

## 2021-03-04 RX ADMIN — LEVETIRACETAM 500 MG: 500 TABLET, FILM COATED ORAL at 09:33

## 2021-03-04 RX ADMIN — ACETAMINOPHEN 1000 MG: 500 TABLET ORAL at 21:19

## 2021-03-04 RX ADMIN — FLUOXETINE 80 MG: 20 CAPSULE ORAL at 09:32

## 2021-03-04 RX ADMIN — DEXAMETHASONE 4 MG: 2 TABLET ORAL at 22:07

## 2021-03-04 RX ADMIN — DEXAMETHASONE 4 MG: 2 TABLET ORAL at 06:19

## 2021-03-04 RX ADMIN — BUSPIRONE HYDROCHLORIDE 30 MG: 15 TABLET ORAL at 21:20

## 2021-03-04 RX ADMIN — LEVETIRACETAM 500 MG: 500 TABLET, FILM COATED ORAL at 21:20

## 2021-03-04 RX ADMIN — FAMOTIDINE 20 MG: 20 TABLET, FILM COATED ORAL at 09:32

## 2021-03-04 NOTE — PLAN OF CARE
FOCUS/GOAL  Pain management and Safety management    ASSESSMENT, INTERVENTIONS AND CONTINUING PLAN FOR GOAL:  Patient appeared comfortable in bed and sleeping during rounds. CPAP in place. Independent with bed mobility. SBA with transfers using gait belt. No complaints made so far. Will continue to monitor.

## 2021-03-04 NOTE — TELEPHONE ENCOUNTER
Spoke with patients daughter regarding RN staple removal date. Daughter will reach out to ARU and request an early discharge to make 10 am appointment for staple removal. She will reach back out to us to update if appointment needs to be changed to a different time.

## 2021-03-04 NOTE — PLAN OF CARE
FOCUS/GOAL  Pain management, Mobility, and Safety management    ASSESSMENT, INTERVENTIONS AND CONTINUING PLAN FOR GOAL:  Patient is alert and oriented x 4. Vital signs stable. Denied pain. Incision on head is dry and approximated with staples. SBA for transfers. Continent of both bladder and bowel. PRN Tylenol and Ativan given with hs meds per request for headache and to help her go to sleep tonight.

## 2021-03-04 NOTE — PROGRESS NOTES
03/04/21 1303   General Information   Onset of Illness/Injury or Date of Surgery 02/23/21   Referring Physician Dr Rosenbaum.MD   Pertinent History of Current Problem Olga Bailey is a 75 year old woman with past medical history of depression, anxiety, asthma, MARCELLE, benign meningioma (2012) who presented with several weeks of word finding/ difficulties expressing self, changes in hand writing MRI revealed left lateral frontoparietal mass with edema she was advised to present to ED was admitted to [facility] 2/17/21 underwent left parietal craniotomy and tumor resection 2/23/21   Type of Evaluation   Type of Evaluation Swallow Evaluation   Oral Motor   Oral Musculature anomalies present  (mild right labial decrease)   Lip Function (Oral Motor)   Retraction, Lip Range of Motion right side   Comment, Lip Function (Oral Motor) mild right labial droop    Tongue Function (Oral Motor)   Comment, Tongue Function (Oral Motor) mild right decrease in coordination    Vocal Quality/Secretion Management (Oral Motor)   Vocal Quality (Oral Motor) WFL   General Swallowing Observations   Swallowing Evaluation Clinical swallow evaluation   Clinical Swallow Evaluation   Feeding Assistance no assistance needed   Clinical Swallow Evaluation Textures Trialed Thin Liquids;Solid Foods   Clinical Swallow Eval: Thin Liquid Texture Trial   Mode of Presentation, Thin Liquids cup;straw   Volume of Liquid or Food Presented 4 oz   Oral Phase of Swallow WFL   Pharyngeal Phase of Swallow intact   Diagnostic Statement No aspiration s/s ; swallow is generally timely    Clinical Swallow Evaluation: Solid Food Texture Trial   Mode of Presentation, Solid self-fed   Volume of Solid Food Presented small bites os DD3 and regular solids    Oral Phase, Solid other (see comments)  (midly slower oral prep)   Pharyngeal Phase, Solid coughing/choking;repeated swallows;throat clearing   Diagnostic Statement toelrated DD3 without overt aspiration s/s or  sensation of pharyngeal retention; pt did however with regular solids have some mild throat clearing and cough and also some sensation of pharygneal retention; mildly slower oral prep with all    Esophageal Phase of Swallow   Patient reports or presents with symptoms of esophageal dysphagia No   Swallowing Recommendations   Diet Consistency Recommendations thin liquids;dysphagia level 3 (advanced)   Supervision Level for Intake distant supervision needed   Mode of Delivery Recommendations bolus size, small;food moistened   Swallowing Maneuver Recommendations alternate food and liquid intake;effortful (hard) swallow   Recommended Feeding/Eating Techniques (Swallow Eval) maintain upright sitting position for eating;maintain upright posture during/after eating for 30 minutes   Medication Administration Recommendations, Swallowing (SLP) ok to take pills whole as tolerated but if difficulty then crush and give in puree   Comment, Swallowing Recommendations mild oral pharyngeal dyspahgia    SLP Therapy Assessment/Plan   Criteria for Skilled Therapeutic Interventions Met (SLP Eval) yes;treatment indicated   SLP Diagnosis mild oral pharyngeal dysphagia   Rehab Potential (SLP Eval) good, to achieve stated therapy goals   Therapy Frequency (SLP Eval) daily   Predicted Duration of Therapy Intervention (SLP Eval) 1 week   Comment, Therapy Assessment/Plan (SLP) Completed clinical swallow eval per MD. Pt presents with mild oral pharygneal dysphagia characterized by mildly slower oral prep with DD3 and regular solids. Pt is demonsrating adequate oral clearance with both - but does  have some intermittent mild throat clearing and coughing with regular solids vs DD3 solids- suspect some pharyngeal weakness resulting in pharyngeal retention. There were no aspiration s/s on thin liquids by cup rim or by straw. Recommend continue with current DD3 with thin liquids. Upright for all po small bites/sips, alternate solids and liquids, hard  swallow , pace self- eat slowly.    Therapy Plan Review/Discharge Plan (SLP)   Therapy Plan Review (SLP) evaluation/treatment results reviewed;care plan/treatment goals reviewed;risks/benefits reviewed;current/potential barriers reviewed;participants voiced agreement with care plan;participants included;patient    Total Evaluation Time   Total Evaluation Time (Minutes) 45

## 2021-03-04 NOTE — PROGRESS NOTES
Pt dtr [place] called and had some questions about discharge. Pt has a neurosurgery apt on Tuesday (day of discharge) discussed barriers with inpt vs op apts and timing (apt 10am). Discussed moving the discharge up to Monday after full-day of therapy and previously scheduled FT. Dtr in agreement as long as team doesn't feel that it's rushing the discharge. Will discuss with team during rounds tomorrow. Dtr is now not going to attend rounds but asking for a call from PT/OT AND SLP. Pt dtr asking for list of foods that pt can have on DD3 so that she can plan ahead and go to the store. Will update the team.     Leeanne Tan, OPAL, CAD-IL  [facility]   Phone: [phone]  I   Pager: [phone]

## 2021-03-04 NOTE — PROGRESS NOTES
"  [facility]   Acute Rehabilitation Unit  Daily progress note    INTERVAL HISTORY  Olga Bailey was seen in bed reports she is doing well, grateful for therapy, denies n/v/d, sob, headaches, dizziness, issues or concerns with bowel or bladder.  Reports she was very restless last night, and feels tired today.  She feels sleep was quite limited, no pain or other things keeping her up, mind kept going, feeling worried, \"kept thrashing around in bed\".  Feels anxiety is manageable in daytime.  Will try atarax prn, scheduled melatonin and monitor.     OT: Pt continues to require cues to recall craniotomy precautions with morning ADLs. Continuing to note impaired sequencing, sorting, and right sided inattention skills during ADLs. Pt demonstrating difficulty with med mgmt task, pt would benefit from MAP program with nursing prior to discharge. Recommending ongoing OT services to maximize IND and safety with ADLs/IADLs and functional mobility, and further assess cognition.     SLP: Assessment: Completed clinical swallow eval per MD. Pt presents with mild oral pharygneal dysphagia characterized by mildly slower oral prep with DD3 and regular solids. Pt is demonsrating adequate oral clearance with both - but does  have some intermittent mild throat clearing and coughing with regular solids vs DD3 solids- suspect some pharyngeal weakness resulting in pharyngeal retention. There were no aspiration s/s on thin liquids by cup rim or by straw. Recommend continue with current DD3 with thin liquids. Upright for all po small bites/sips, alternate solids and liquids, hard swallow , pace self- eat slowly.           MEDICATIONS    amLODIPine  10 mg Oral Daily     busPIRone HCl  30 mg Oral BID     [START ON 3/9/2021] dexamethasone  2 mg Oral Q8H ELGIN     dexamethasone  4 mg Oral Q8H ELGIN     famotidine  20 mg Oral BID     FLUoxetine  80 mg Oral Daily     levETIRAcetam  500 mg Oral BID     " "lisinopril  2.5 mg Oral Daily     melatonin  1 mg Oral At Bedtime        acetaminophen, bisacodyl, calcium carbonate, docusate sodium, hydrALAZINE, hydrOXYzine **OR** hydrOXYzine, LORazepam, oxymetazoline, senna-docusate, sennosides     PHYSICAL EXAM  /60 (BP Location: Right arm)   Pulse 64   Temp 96.6  F (35.9  C) (Oral)   Resp 16   Ht 1.6 m (5' 3\")   Wt 69.9 kg (154 lb 3.2 oz)   SpO2 97%   BMI 27.32 kg/m    Gen: awake alert anxious  HEENT: mmm  CV: rrr  Pulm: non labored clear on room air  Ext: no le edema  Neuro/MSK: awake alert moves all extremities:  Skin: incision cdi without drainage, significant though fading ecchymosis down neck     LABS  Lab Results   Component Value Date    WBC 15.6 02/28/2021     Lab Results   Component Value Date    RBC 3.47 02/28/2021     Lab Results   Component Value Date    HGB 9.9 02/28/2021     Lab Results   Component Value Date    HCT 30.8 02/28/2021     Lab Results   Component Value Date    MCV 89 02/28/2021     Lab Results   Component Value Date    MCH 28.5 02/28/2021     Lab Results   Component Value Date    MCHC 32.1 02/28/2021     Lab Results   Component Value Date    RDW 13.5 02/28/2021     Lab Results   Component Value Date     02/28/2021     Last Comprehensive Metabolic Panel:  Sodium   Date Value Ref Range Status   03/04/2021 135 133 - 144 mmol/L Final     Potassium   Date Value Ref Range Status   03/04/2021 4.3 3.4 - 5.3 mmol/L Final     Chloride   Date Value Ref Range Status   03/04/2021 104 94 - 109 mmol/L Final     Carbon Dioxide   Date Value Ref Range Status   03/04/2021 24 20 - 32 mmol/L Final     Anion Gap   Date Value Ref Range Status   03/04/2021 7 3 - 14 mmol/L Final     Glucose   Date Value Ref Range Status   03/04/2021 111 (H) 70 - 99 mg/dL Final     Urea Nitrogen   Date Value Ref Range Status   03/04/2021 32 (H) 7 - 30 mg/dL Final     Creatinine   Date Value Ref Range Status   03/04/2021 0.62 0.52 - 1.04 mg/dL Final     GFR Estimate   Date " Value Ref Range Status   03/04/2021 88 >60 mL/min/[1.73_m2] Final     Comment:     Non  GFR Calc  Starting 12/18/2018, serum creatinine based estimated GFR (eGFR) will be   calculated using the Chronic Kidney Disease Epidemiology Collaboration   (CKD-EPI) equation.       Calcium   Date Value Ref Range Status   03/04/2021 8.0 (L) 8.5 - 10.1 mg/dL Final       Rehabilitation - continue comprehensive acute inpatient rehabilitation program with multidisciplinary approach including therapies, rehab nursing, and physiatry following. See interval history for updates.      ASSESSMENT AND PLAN    Olga Bailey is a 75 year old woman with past medical history of depression, anxiety, asthma, MARCELLE, benign meningioma (2012) who presented with several weeks of word finding/ difficulties expressing self, changes in hand writing MRI revealed left lateral frontoparietal mass with edema she was advised to present to ED was admitted to [facility] 2/17/21 underwent left parietal craniotomy and tumor resection 2/23/21.  Admitted to acute rehab 3/1/21.     left frontal-parietal mass- pathology consistent with glioblastoma  S/P L parietal craniotomy and excisional biopsy, resection   MRI ordered in clinic found: 3.3 x 2.7 x 2.9 cm intra-axial mass in the lateral left frontoparietal region with  moderate amount of edema w/o midline shift,  unchanged small right occipital meningioma. Presented to ED as directed and was started on IV steroids and seen by neurosurgery. CT CAP without signs of malignancy.   - f/u neurosurgery  - f/u neuro oncology   - keppra 500 mg bid x 6 weeks per neurosurgery recs  - decadron 4 mg tid then to 2 mg tid as per neurosurgery recs.   - keep incision clean and dry- plan for staple removal 3/9/21- currently scheduled as outpatient pending duration of ARU stay  -continue PT/OT/SLP  -tylenol prn     Hypertension- BP's elevated during hospitalization, was started on amlodipine 2/21. possibly due  to steroids vs underlying benign essential HTN BP 130s-140s   -continue amlodipine, and low dose lisinopril (started 3/2)  -trend BP       Acute anemia- suspected acute blood loss anemia.  hgb 11.7 on admission--> 9.9 2/28 stable-->3/4/21 10.6  -stable.     Epistaxis- intermittent epistaxis which resolves ~ 10 minutes has had multiple since surgery.  Hgb, PLT  stable   -afrin prn nose bleed     Leukocytosis:  suspected to be steroid induced. WBC 18.4 3/4 ongoing elevation though up from prior.  Afebrile ROS unremarkable continue to monitor.   - Monitor clinically for signs of infection.     Depression  Anxiety  Insomnia  Follows with psychiatry as outpatient is on Wellbutrin, Buspar, and prozac, Wellbutrin held because of potential to lower seizure threshold. Started on PRN lorazepam this hospitalization. Not sleeping well, at least in part due to anxiety.   -try melatonin 1 mg at bedtime, prn atarax.   - Continue buspirone, fluoxetine.   - Continue PRN PO lorazepam 0.5 mg bid prn     Asthma-not on medications prior to admission, no sob, lung sounds clear to auscultation   MARCELLE  -continue home cpap      1. Adjustment to disability:  Psychology, .   2. FEN: reg  3. Bowel: continent  4. Bladder: continent  5. DVT Prophylaxis:  ambulation   6. GI Prophylaxis: ppi  7. Code: full  8. Disposition: goal for home  9. ELOS: ~7 days  10. Follow up Appointments on Discharge: neurosurgery, pcp       seen & discussed with Dr. Rosenbaum  PM&R Staff Physician    Kary Munoz PA-C  Physical Medicine & Rehabilitation

## 2021-03-04 NOTE — PROGRESS NOTES
A&Ox4. Denies pain, SOB, dizziness, N/V, numb/tingling. States loose stools are getting better. LBM today. Cont of B/B in toilet. SBA with gait belt. Cooperative with cares. Eats meals independently. Word-finding difficulty but pt is able to communicate needs. DD3, thin diet. Pt feeds herself.    WBC trending up. PA aware and ordered CBC in morning and said to continue to monitor for now. PA also instructed writer to tell nursing staff to give pt tylenol and ativan at bedtime if pt requests it, then check on pt 2 hours later to see if she is sleeping. If pt still awake, offer atarax- MAR note written about this.    Pt pleasant and cooperative with cares. Continue POC.

## 2021-03-04 NOTE — PLAN OF CARE
Discharge Planner Post-Acute Rehab SLP:      Discharge Plan: home with assist, and further SLP tx     Precautions: fall     Current Status:  Communication: mild/moderate aphasia for verbal and written expression, reading comprehension  Cognition: not formally assessed 2/2 aphasia  Swallow: on NDD3 diet, thin fluids with goal to advance to regular diet     Assessment: Completed reading comprehension task- short paragraph level- at 9/10 accuraccy. With writing task- pt with rewriting words udner their categories- having a lot of difficulty with this- noting letter substitutions even with looking at and copying the word. Competled verbal expression task- iding the category that 3 words belong to ( convergent) and then naming additional members- noting some occasional paraphasic errors and needing increased time at times to name additional words but often could  name 3 additional members     Other Barriers to Discharge (Family Training, etc): TBD*

## 2021-03-04 NOTE — PROGRESS NOTES
Discharge Planner Post-Acute Rehab OT:      Discharge Plan: home; to dtr.s home initially     Precautions: post op crani precautions, falls, right sided inattention     Current Status:  ADLs: SBA with UB/LB dressing, SBA for toileting, SBA/min A standing at sink for g/h-assistance d/t surgical incision, SBA/CGA amb. without AD throughout room/bathroom/hallway.  IADLs: not yet tested. May require supervision initially.   Vision/Cognition: not yet formally tested. Wears glasses. Right sided inattention. word finding deficits noted.     Assessment: Pt continues to require cues to recall craniotomy precautions with morning ADLs. Continuing to note impaired sequencing, sorting, and right sided inattention skills during ADLs. Pt demonstrating difficulty with med mgmt task, pt would benefit from MAP program with nursing prior to discharge. Recommending ongoing OT services to maximize IND and safety with ADLs/IADLs and functional mobility, and further assess cognition.      Other Barriers to Discharge (DME, Family Training, etc): Family training Monday 9-11 A.M. Pt. has walk in shower, shower chair and grab bars

## 2021-03-04 NOTE — PLAN OF CARE
Discharge Planner Post-Acute Rehab PT:      L frontoparietal mass resection  Discharge Plan: home with dtr assist vs to dtr home with 24/7 assist OPPT     Precautions: fall, R neglect-mild, needs cues to rest     Current Status:  Bed Mobility: I  Transfer: SBA  Gait: ' no A.D also working with SEC d/t impaired balance increasing with fatigue  Stairs: 12 reciprocal pattern, close SBA one rail.   Balance: 19/30 FGA, impaired sensation/proprioception     Assessment:  Session performed outside this afternoon w/ focus on dynamic balance and mobility on various surfaces and in distracting environments. Time spent w/ dynamic stepping activities on grass, pt needing CGA-modA pending tasks. Increased focus on stability noted, pt challenged with any dual task component introduced.      Other Barriers to Discharge (DME, Family Training, etc): potential need for family training.

## 2021-03-05 ENCOUNTER — APPOINTMENT (OUTPATIENT)
Dept: PHYSICAL THERAPY | Facility: CLINIC | Age: 76
End: 2021-03-05
Payer: MEDICARE

## 2021-03-05 ENCOUNTER — APPOINTMENT (OUTPATIENT)
Dept: OCCUPATIONAL THERAPY | Facility: CLINIC | Age: 76
End: 2021-03-05
Payer: MEDICARE

## 2021-03-05 ENCOUNTER — APPOINTMENT (OUTPATIENT)
Dept: SPEECH THERAPY | Facility: CLINIC | Age: 76
End: 2021-03-05
Payer: MEDICARE

## 2021-03-05 LAB
ERYTHROCYTE [DISTWIDTH] IN BLOOD BY AUTOMATED COUNT: 13.8 % (ref 10–15)
HCT VFR BLD AUTO: 31.1 % (ref 35–47)
HGB BLD-MCNC: 10.2 G/DL (ref 11.7–15.7)
MCH RBC QN AUTO: 29.3 PG (ref 26.5–33)
MCHC RBC AUTO-ENTMCNC: 32.8 G/DL (ref 31.5–36.5)
MCV RBC AUTO: 89 FL (ref 78–100)
PLATELET # BLD AUTO: 275 10E9/L (ref 150–450)
RBC # BLD AUTO: 3.48 10E12/L (ref 3.8–5.2)
WBC # BLD AUTO: 18.4 10E9/L (ref 4–11)

## 2021-03-05 PROCEDURE — 999N000157 HC STATISTIC RCP TIME EA 10 MIN

## 2021-03-05 PROCEDURE — 250N000013 HC RX MED GY IP 250 OP 250 PS 637: Performed by: PHYSICIAN ASSISTANT

## 2021-03-05 PROCEDURE — 999N000150 HC STATISTIC PT MED CONFERENCE < 30 MIN: Performed by: STUDENT IN AN ORGANIZED HEALTH CARE EDUCATION/TRAINING PROGRAM

## 2021-03-05 PROCEDURE — 97535 SELF CARE MNGMENT TRAINING: CPT | Mod: GO | Performed by: OCCUPATIONAL THERAPIST

## 2021-03-05 PROCEDURE — 92507 TX SP LANG VOICE COMM INDIV: CPT | Mod: GN | Performed by: SPEECH-LANGUAGE PATHOLOGIST

## 2021-03-05 PROCEDURE — 36415 COLL VENOUS BLD VENIPUNCTURE: CPT | Performed by: PHYSICIAN ASSISTANT

## 2021-03-05 PROCEDURE — 250N000012 HC RX MED GY IP 250 OP 636 PS 637: Performed by: PHYSICIAN ASSISTANT

## 2021-03-05 PROCEDURE — 99233 SBSQ HOSP IP/OBS HIGH 50: CPT | Performed by: PHYSICAL MEDICINE & REHABILITATION

## 2021-03-05 PROCEDURE — 85027 COMPLETE CBC AUTOMATED: CPT | Performed by: PHYSICIAN ASSISTANT

## 2021-03-05 PROCEDURE — 999N000125 HC STATISTIC PATIENT MED CONFERENCE < 30 MIN: Performed by: STUDENT IN AN ORGANIZED HEALTH CARE EDUCATION/TRAINING PROGRAM

## 2021-03-05 PROCEDURE — 97116 GAIT TRAINING THERAPY: CPT | Mod: GP | Performed by: REHABILITATION PRACTITIONER

## 2021-03-05 PROCEDURE — 94660 CPAP INITIATION&MGMT: CPT

## 2021-03-05 PROCEDURE — 999N000125 HC STATISTIC PATIENT MED CONFERENCE < 30 MIN: Performed by: SPEECH-LANGUAGE PATHOLOGIST

## 2021-03-05 PROCEDURE — 97530 THERAPEUTIC ACTIVITIES: CPT | Mod: GP | Performed by: REHABILITATION PRACTITIONER

## 2021-03-05 PROCEDURE — 90834 PSYTX W PT 45 MINUTES: CPT | Performed by: PSYCHOLOGIST

## 2021-03-05 PROCEDURE — 128N000003 HC R&B REHAB

## 2021-03-05 PROCEDURE — 92526 ORAL FUNCTION THERAPY: CPT | Mod: GN | Performed by: SPEECH-LANGUAGE PATHOLOGIST

## 2021-03-05 PROCEDURE — 250N000013 HC RX MED GY IP 250 OP 250 PS 637: Performed by: PHYSICAL MEDICINE & REHABILITATION

## 2021-03-05 RX ORDER — CALCIUM CARBONATE 500 MG/1
TABLET, CHEWABLE ORAL 2 TIMES DAILY PRN
Status: CANCELLED | COMMUNITY
Start: 2021-03-05

## 2021-03-05 RX ORDER — HYDROXYZINE HYDROCHLORIDE 25 MG/1
25-50 TABLET, FILM COATED ORAL EVERY 6 HOURS PRN
Qty: 20 TABLET | Refills: 0 | Status: CANCELLED | OUTPATIENT
Start: 2021-03-05

## 2021-03-05 RX ORDER — LORAZEPAM 0.5 MG/1
0.5 TABLET ORAL ONCE
Status: COMPLETED | OUTPATIENT
Start: 2021-03-05 | End: 2021-03-05

## 2021-03-05 RX ORDER — LANOLIN ALCOHOL/MO/W.PET/CERES
3 CREAM (GRAM) TOPICAL AT BEDTIME
Status: DISCONTINUED | OUTPATIENT
Start: 2021-03-05 | End: 2021-03-08 | Stop reason: HOSPADM

## 2021-03-05 RX ORDER — SENNOSIDES 8.6 MG
2 TABLET ORAL 2 TIMES DAILY PRN
Status: CANCELLED | COMMUNITY
Start: 2021-03-05

## 2021-03-05 RX ORDER — LISINOPRIL 2.5 MG/1
2.5 TABLET ORAL DAILY
Qty: 30 TABLET | Refills: 0 | Status: CANCELLED | OUTPATIENT
Start: 2021-03-06

## 2021-03-05 RX ORDER — DEXAMETHASONE 2 MG/1
TABLET ORAL
Qty: 92 TABLET | Refills: 0 | Status: CANCELLED | OUTPATIENT
Start: 2021-03-05

## 2021-03-05 RX ADMIN — FLUOXETINE 80 MG: 20 CAPSULE ORAL at 08:20

## 2021-03-05 RX ADMIN — AMLODIPINE BESYLATE 10 MG: 10 TABLET ORAL at 08:20

## 2021-03-05 RX ADMIN — DEXAMETHASONE 4 MG: 2 TABLET ORAL at 14:27

## 2021-03-05 RX ADMIN — LEVETIRACETAM 500 MG: 500 TABLET, FILM COATED ORAL at 22:06

## 2021-03-05 RX ADMIN — ACETAMINOPHEN 500 MG: 500 TABLET ORAL at 22:16

## 2021-03-05 RX ADMIN — BUSPIRONE HYDROCHLORIDE 30 MG: 15 TABLET ORAL at 08:20

## 2021-03-05 RX ADMIN — LEVETIRACETAM 500 MG: 500 TABLET, FILM COATED ORAL at 08:20

## 2021-03-05 RX ADMIN — LORAZEPAM 0.5 MG: 0.5 TABLET ORAL at 22:06

## 2021-03-05 RX ADMIN — DEXAMETHASONE 4 MG: 2 TABLET ORAL at 22:06

## 2021-03-05 RX ADMIN — LISINOPRIL 2.5 MG: 2.5 TABLET ORAL at 08:20

## 2021-03-05 RX ADMIN — DEXAMETHASONE 4 MG: 2 TABLET ORAL at 05:22

## 2021-03-05 RX ADMIN — BUSPIRONE HYDROCHLORIDE 30 MG: 15 TABLET ORAL at 22:06

## 2021-03-05 RX ADMIN — LORAZEPAM 0.5 MG: 0.5 TABLET ORAL at 22:16

## 2021-03-05 RX ADMIN — FAMOTIDINE 20 MG: 20 TABLET, FILM COATED ORAL at 22:06

## 2021-03-05 RX ADMIN — FAMOTIDINE 20 MG: 20 TABLET, FILM COATED ORAL at 08:20

## 2021-03-05 RX ADMIN — MELATONIN TAB 3 MG 3 MG: 3 TAB at 22:05

## 2021-03-05 ASSESSMENT — MIFFLIN-ST. JEOR: SCORE: 1162.22

## 2021-03-05 NOTE — PLAN OF CARE
Discharge Planner Post-Acute Rehab SLP:      Discharge Plan: home with assist, and further SLP tx     Precautions: fall     Current Status:  Communication: mild/moderate aphasia for verbal and written expression, reading comprehension  Cognition: not formally assessed 2/2 aphasia  Swallow: on NDD3 diet, thin fluids with goal to advance to regular diet     Assessment:  Worked on spelling, copying words 80%, filling in missing letter (given choices 60% accuracy Also pt seen for swallowing, trialed 2 items regular diet, pt did well no throat clearing/feeling of pharyngeal retention. will assess again before advancing diet.  Other Barriers to Discharge (Family Training, etc): TBD

## 2021-03-05 NOTE — CONSULTS
"  Health Psychology                  Clinic    Department of Medicine  Bina Florence, Ph.D., L.P. [phone]                          [facility]  [facility] Parul Grider, Ph.D.,  L.P. [phone]                 3rd Floor  [place] Mail [address]   Olya Barrientos, Ph.D., L.P. [phone]    [address] Rena Del Castillo, Ph.D., L.P. [phone]            [place] [address]          Moose Marie, Ph.D., A.RAISA.[email or web address].SARAH., L.P. [phone]      Maribel Grier, Ph.D., L.P. [phone]      Inpatient Health Psychology Consultation*    DOS: 3/5/2021    Clinical Information:  From admission H&P 3/1/2021: \"Olga Bailey is a 75 year old woman with past medical history of depression, anxiety, asthma, MARCELLE, benign meningioma (2012) who presented with several weeks of word finding/ difficulties expressing self, changes in hand writing MRI revealed left lateral frontoparietal mass with edema she was advised to present to ED was admitted to [facility] 2/17/21 underwent left parietal craniotomy and tumor resection 2/23/21.  Admitted to acute rehab 3/1/21.\" Ms Bailey has a significant history of depression and anxiety. This psychology consultation was requested to provide Ms Dow with additional emotional support during this ARC admission in context of new brain tumor.   Ms Bailey was extremely welcoming of this contact with Health Psychology and opportunity to have emotionally focused conversation and support. She was very open about her personal history which included trauma that led to many years of living with PTSD and depression. She has been engaged in psychotherapy for many years and has gained a significant level of control over trauma reactions and other symptoms that negatively affect her functioning and sense of wellbeing. She also follows with psychiatry as outpatient is on Wellbutrin, Buspar, and prozac, " Wellbutrin was held during initial hospitalization  held because of potential to lower seizure threshold. Started on PRN lorazepam this hospitalization.  Reporting very balanced mood currently. Denies any significant anxiety triggered by current situation in which she still has not received final surgical pathology. Describes very supportive and effective therapeutic relationship with her longstanding psychotherapist. Also very close with her children. Talks about difficult marriage, now  about 20 years. Dynamic with ex- can still be difficult. Very spiritual although not affiliated with any formal [employer] after growing up in the [employer] and moving away from that institution. [place] in the [place] but grew up from early age in [place] where she met her ex-. 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  Acknowledges that she still experiences some trauma reactions within dreams or flashbacks but hs learned tools that allow her to be less distressed and to maintain her emotional balance more quickly and effectively.   Reports current mood as content, exhibits a completely euthymic to bright affect. Exhibiting some expressive aphasia and memory issues. Has awareness of this and patience with herself when she hits a block.  Feeling very positively supported and cared for within this ARC admission. Appreciative of this contact. Looking forward to upcoming discharge  Assessment: Ms Bailey is doing extremely well emotionally in context of new brain mass that has been resected and for which she is awaiting final surgical pathology. This is particularly impressive in context of having a history of PTSD and major depressive disorder. She continues to attend regular psychotherapy and to use psychotropic medication. Uses many behavioral and mind-body tools with apparent effectiveness. Has excellent support from internalized tools, personal martin, family and friends, and mental  health providers.  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  Time Spent with Patient: 45 minutes (In: 1120 Out 1205)  Service Provided: Individual psychotherapy   Provider: Bina Florence, Ph.D,    Provider Pager: [phone]   Provider Phone: [phone]        *In accordance with the Rules of the [place] [employer], it is noted that psychological descriptions and scientific procedures underlying psychological evaluations have limitations.  Absolute predictions cannot be made based on information in this report.

## 2021-03-05 NOTE — PLAN OF CARE
Discharge Planner Post-Acute Rehab PT:     Discharge Plan: home with dtr assist vs to dtr home with 24/7 assist OPPT     Precautions: fall, R neglect-mild, needs cues to rest    Current Status:  Bed Mobility: IND   Transfer: SBA to mod I OT to confirm mod I   Gait: 400'x 1 no A.D   Stairs: 4x3 step one rail, while carrying heavy bag   Balance: SBA to mod I    Assessment:  OT to confirm mod I in room, no A.D. pt demo high level gait and balance task with SBA to IND> pt is progressing well. Some extra time with sequencing multi step tasks.     Other Barriers to Discharge (DME, Family Training, etc): potential need for family training

## 2021-03-05 NOTE — PLAN OF CARE
Individualized Overall Plan Of Care (IOPOC)      Rehab diagnosis/Impairment Group Code: Brain dysfunction 02.1 non-traumatic: s/p left parietal craniotomy w/ tumor resection  S/p craniotomy       Expected functional outcome: Mod I for ambulation, mobility, and ADLs    Clinical Impression Comments: Improving    Mobility: Pt is a 75 year old female who is at a moderate risk for falling due to balance, proprioception and attention deficits. Pt requires Gerard for safety with higher level balance challenges. Pt also fatigues quickly. Pt will benefit from skilled PT intervention in order to reduce listed deficits.     ADL:    ADLs: SBA with UB/LB dressing, SBA for toileting, SBA/min A standing at sink for g/h-assistance d/t surgical incision, SBA/CGA amb. without AD throughout room/bathroom/hallway.  IADLs: not yet tested. May require supervision initially.   Vision/Cognition: not yet formally tested. Wears glasses. Right sided inattention. word finding deficits noted.    Communication/Cognition/Swallow: Completed clinical swallow eval per MD. Pt presents with mild oral pharygneal dysphagia characterized by mildly slower oral prep with DD3 and regular solids. Pt is demonsrating adequate oral clearance with both - but does  have some intermittent mild throat clearing and coughing with regular solids vs DD3 solids- suspect some pharyngeal weakness resulting in pharyngeal retention. There were no aspiration s/s on thin liquids by cup rim or by straw. Recommend continue with current DD3 with thin liquids. Upright for all po small bites/sips, alternate solids and liquids, hard swallow , pace self- eat slowly.      Intensity of therapy:   PT 60 minutes, Daily, for 1 week  OT 60 minutes, Daily, for  1 week  SLP 60 minutes, daily, for 1 week    Orthotics None  Education wound care  Neuropsychology Testing: No  Other:  None      Medical Prognosis: Good for goals    Physician summary statement: Doing well and making progress towards  goals and discharge home.  Pathology from tumor is Glioblastoma.    Discharge destination: prior home  Discharge rehabilitation needs: outpatient, PT, OT and SLP      Estimated length of stay: 7 days      Rehabilitation Physician Jl Rosenbaum MD

## 2021-03-05 NOTE — PLAN OF CARE
FOCUS/GOAL  Bowel management and Bladder management    ASSESSMENT, INTERVENTIONS AND CONTINUING PLAN FOR GOAL:  Patient A&O x4. Upgraded to Ind during day, SBA at [facility]. Continent of bowel and bladder. LBM 3/5. No complaints of pain. Rounds held today, look at note for more info. Continue w/ plan of care.

## 2021-03-05 NOTE — PROGRESS NOTES
"  [facility]   Acute Rehabilitation Unit  Daily progress note    INTERVAL HISTORY  Olga Bailey was seen during team rounds, daughter also present for duration of visit.  Reports sleep remains challenging feels quite restless overnight. Will continue to titrate medication regimen as indicated to support improved sleep.   Denies any new concerns/ complaints.  Discussed ongoing WBC elevation with stable vital signs and negative ROS in setting of ongoing steroids, will continue to monitor clinically.      Is making good progress with therapy now I in room during day with stand by assist at night, anticipating Independent ambulation in short distances, noted to have impaired balance, mild right inattention when ambulating longer distances at this time anticipating discharge home with cane.  Noted to have ongoing cognitive issues with reading/writing, higher level sequencing with recommendation for oversight with (IADLS)  medication management, transportation, cooking, finances, etc.     See rounds note by Dr. Rosenbaum for further details.           MEDICATIONS    amLODIPine  10 mg Oral Daily     busPIRone HCl  30 mg Oral BID     [START ON 3/9/2021] dexamethasone  2 mg Oral Q8H ELGIN     dexamethasone  4 mg Oral Q8H ELGIN     famotidine  20 mg Oral BID     FLUoxetine  80 mg Oral Daily     levETIRAcetam  500 mg Oral BID     lisinopril  2.5 mg Oral Daily     melatonin  1 mg Oral At Bedtime        acetaminophen, bisacodyl, calcium carbonate, docusate sodium, hydrALAZINE, hydrOXYzine **OR** hydrOXYzine, LORazepam, oxymetazoline, senna-docusate, sennosides     PHYSICAL EXAM  /64 (BP Location: Left arm)   Pulse 87   Temp 96.2  F (35.7  C) (Oral)   Resp 16   Ht 1.6 m (5' 3\")   Wt 69.8 kg (153 lb 14.4 oz)   SpO2 96%   BMI 27.26 kg/m    Gen: awake alert anxious  Pulm: non labored on room air  Ext: no le edema  Neuro/MSK: awake alert moves all extremities    LABS  Lab Results "   Component Value Date    WBC 15.6 02/28/2021     Lab Results   Component Value Date    RBC 3.47 02/28/2021     Lab Results   Component Value Date    HGB 9.9 02/28/2021     Lab Results   Component Value Date    HCT 30.8 02/28/2021     Lab Results   Component Value Date    MCV 89 02/28/2021     Lab Results   Component Value Date    MCH 28.5 02/28/2021     Lab Results   Component Value Date    MCHC 32.1 02/28/2021     Lab Results   Component Value Date    RDW 13.5 02/28/2021     Lab Results   Component Value Date     02/28/2021     Last Comprehensive Metabolic Panel:  Sodium   Date Value Ref Range Status   03/04/2021 135 133 - 144 mmol/L Final     Potassium   Date Value Ref Range Status   03/04/2021 4.3 3.4 - 5.3 mmol/L Final     Chloride   Date Value Ref Range Status   03/04/2021 104 94 - 109 mmol/L Final     Carbon Dioxide   Date Value Ref Range Status   03/04/2021 24 20 - 32 mmol/L Final     Anion Gap   Date Value Ref Range Status   03/04/2021 7 3 - 14 mmol/L Final     Glucose   Date Value Ref Range Status   03/04/2021 111 (H) 70 - 99 mg/dL Final     Urea Nitrogen   Date Value Ref Range Status   03/04/2021 32 (H) 7 - 30 mg/dL Final     Creatinine   Date Value Ref Range Status   03/04/2021 0.62 0.52 - 1.04 mg/dL Final     GFR Estimate   Date Value Ref Range Status   03/04/2021 88 >60 mL/min/[1.73_m2] Final     Comment:     Non  GFR Calc  Starting 12/18/2018, serum creatinine based estimated GFR (eGFR) will be   calculated using the Chronic Kidney Disease Epidemiology Collaboration   (CKD-EPI) equation.       Calcium   Date Value Ref Range Status   03/04/2021 8.0 (L) 8.5 - 10.1 mg/dL Final       Rehabilitation - continue comprehensive acute inpatient rehabilitation program with multidisciplinary approach including therapies, rehab nursing, and physiatry following. See interval history for updates.      ASSESSMENT AND PLAN    Olga Bailey is a 75 year old woman with past medical history of  depression, anxiety, asthma, MARCELLE, benign meningioma (2012) who presented with several weeks of word finding/ difficulties expressing self, changes in hand writing MRI revealed left lateral frontoparietal mass with edema she was advised to present to ED was admitted to [facility] 2/17/21 underwent left parietal craniotomy and tumor resection 2/23/21.  Admitted to acute rehab 3/1/21.     left frontal-parietal mass- pathology consistent with glioblastoma  S/P L parietal craniotomy and excisional biopsy, resection   MRI ordered in clinic found: 3.3 x 2.7 x 2.9 cm intra-axial mass in the lateral left frontoparietal region with  moderate amount of edema w/o midline shift,  unchanged small right occipital meningioma. Presented to ED as directed and was started on IV steroids and seen by neurosurgery. CT CAP without signs of malignancy.   - f/u neurosurgery  - f/u neuro oncology   - keppra 500 mg bid x 6 weeks per neurosurgery recs  - decadron 4 mg tid then to 2 mg tid as per neurosurgery recs.   - keep incision clean and dry- plan for staple removal 3/9/21- currently scheduled as outpatient  -continue PT/OT/SLP  -tylenol prn     Hypertension- BP's elevated during hospitalization, was started on amlodipine 2/21. possibly due to steroids vs underlying benign essential HTN, low dose lisinopril added 3/2.  BP now 110s-140s.   -continue amlodipine, and low dose lisinopril (started 3/2)  -trend BP       Acute anemia- suspected acute blood loss anemia.  hgb 11.7 on admission--> 9.9 2/28 stable-->3/5/21 10.2.  -stable.     Epistaxis- intermittent epistaxis which resolves ~ 10 minutes has had multiple since surgery.  Hgb, PLT  stable   -afrin prn nose bleed     Leukocytosis:  suspected to be steroid induced. WBC 18.4 3/4 ongoing elevation though up from prior.  Afebrile ROS unremarkable continue to monitor.  WBC stable 3/5  - Monitor clinically for signs/symptoms of infection.     Depression  Anxiety  Insomnia  Follows with  psychiatry as outpatient is on Wellbutrin, Buspar, and prozac, Wellbutrin held because of potential to lower seizure threshold. Started on PRN lorazepam this hospitalization. Not sleeping well, at least in part due to anxiety.   -melatonin 3 mg at bedtime, prn atarax.   - Continue buspirone, fluoxetine.   - Continue PRN PO lorazepam 0.5 mg bid prn     Asthma-not on medications prior to admission, no sob, lung sounds clear to auscultation   MARCELLE  -continue home cpap      1. Adjustment to disability:  Psychology, .   2. FEN: reg  3. Bowel: continent  4. Bladder: continent  5. DVT Prophylaxis:  ambulation   6. GI Prophylaxis: ppi  7. Code: full  8. Disposition: goal for home  9. ELOS: ~7 days  10. Follow up Appointments on Discharge: neurosurgery, pcp       seen & discussed with Dr. Rosenbaum  PM&R Staff Physician    Kary Munoz PA-C  Physical Medicine & Rehabilitation

## 2021-03-05 NOTE — PLAN OF CARE
Acute Rehab Care Conference/Team Rounds      Type: Team Rounds    Present: Dr. Jl Rosenbaum MD Resident Keiht Perez, Kaela Santos PT, Sri Clayton OT, Mary Carmen Vargas SLP, Leeanne LYON, Johanna Kong Resident Chinmay, Barbara House RD, Ashley Barnett RN and patient Olga      Discharge Barriers/Treatment/Education    Rehab Diagnosis: L craniotomy for tumor resection (pathology returned Glioblastoma)    Active Medical Co-morbidities/Prognosis: Depression, anxiety, meningioma, aphasia, asthma, MARCELLE    Safety:  Pt is AOx4 with word-finding difficulty but is using call light appropriately/making needs known. SBA with gait belt.    Pain:  Denies pain     Medications, Skin, Tubes/Lines:  Taking pills whole with water, incision to L head CDI/YOGI approximated with staples.     Swallowing/Nutrition: Pt presents with mild oral pharygneal dysphagia characterized by mildly slower oral prep with DD3 and regular solids. Pt is demonsrating adequate oral clearance with both - but does  have some intermittent mild throat clearing and coughing with regular solids vs DD3 solids- suspect some pharyngeal weakness resulting in pharyngeal retention. There were no aspiration s/s on thin liquids by cup rim or by straw. Recommend continue with current DD3 with thin liquids. Upright for all po small bites/sips, alternate solids and liquids, hard swallow , pace self- eat slowly.   Bowel/Bladder: Pt is continent of B&B. LBM 3/3. BM's have been loose but stated 3/4 that they have getting better.     Psychosocial: /single. Lives alone PTA. Will discharge to dtr's home at discharge with A. Tearful at admission, adjusting to dx. Good support from family. No substance abuse or financial concerns.     ADLs/IADLs: Pt is SBA with UB/LB dressing, SBA for toileting, Pt upgraded to IND in room during day, SBA noc. Pt continues to require cues to recall craniotomy precautions with morning ADLs. Continuing to note impaired  sequencing, sorting, and right sided inattention skills during ADLs. Pt demonstrating difficulty with med mgmt task, pt would benefit from MAP program with nursing prior to discharge. Family training scheduled for MON with pt's daughter.     Mobility:  Pt participates well, motivated. FGA 19/30 indicating increased risk for falls. Pt with R side in-attention, LOB during quick, decreased awareness when fatigued. Pt with increased balance impairments when fatigued. Recommending SEC for longer distances and community mobility. Pt upgraded to IND in room during day, SBA noc.Family training planned for Monday. Follow up with OP PT upon discharge.     Cognition/Language:. Noting mild/moderate aphasia.  Verbal expression/word finding is impaired for more complex tasks , but pt is generally able to functionally express self.  Written expressio is significantly impaired, for spelling/writing at word level, and being unable to generate phrases/sentences as well. auditory comprehension appears WFL but will further monitor.  Reading comprehension mildly impaired at the sentence/paragraph level.Probable 24 hour support at discharge and further SLP tx    Community Re-Entry: close SBA with SEC for limited community distances.     Transportation: will need assist, formal assessment before return to driving    Plan of Care and goals reviewed and updated.    Discharge Plan/Recommendations    Fall Precautions: continue    Overall plan for the patient:   Overall doing well.  Leukocytosis persists, at this time seems most likely due to steroids and there are no signs of infection.  Functionally making improvements and now made independent in the day and SBA at night.  Scored 19/30 on the FGA, still has balance impairments especially with fatigue and quick head turns.  SBA for stairs.  On NDD3 diet, but hopeful to be able to upgrade to regular prior to discharge home.  Will be discharging to daughter's home with family training prior.   24 hr supervision is not required from a physical standpoint, however still recommending higher level of supervision for safety from a cognitive standpoint and for medications and IADLs.  Will initiate MAP program while here.  Tentative discharge date 3/8/21 with outpatient PT, OT, and SLP.        Utilization Review and Continued Stay Justification    Medical Necessity Criteria:    For any criteria that is not met, please document reason and plan for discharge, transfer, or modification of plan of care to address.    Requires intensive rehabilitation program to treat functional deficits?: Yes    Requires 3x per week or greater involvement of rehabilitation physician to oversee rehabilitation program?: Yes    Requires rehabilitation nursing interventions?: Yes    Patient is making functional progress?: Yes    There is a potential for additional functional progress? Yes    Patient is participating in therapy 3 hours per day a minimum of 5 days per week or 15 hours per week in 7 day period?:Yes    Has discharge needs that require coordinated discharge planning approach?:Yes      Barriers/Concerns related to meeting medical necessity criteria:  None    Team Plan to Address Concern:  N/A      Final Physician Sign off    Statement of Approval: I have reviewed and agree with the recommendations and documentation in this care conference note.       Patient Goals  Social Work Goals: Home Monday with family A and OP therapy. No SW needs at this time.       OT goal: hygiene/grooming: independent, modified independent, while standing, within precautions  OT goal: upper body dressing: Independent, Modified independent, including set-up/clothing retrieval  OT goal: lower body dressing: Independent, Modified independent, including set-up/clothing retrieval, within precautions  OT goal: upper body bathing: Independent, Modified independent, within precautions  OT goal: lower body bathing: Independent, Modified independent, with  precautions  OT goal: bed mobility: Independent, Modified independent  OT goal: toilet transfer/toileting: Independent, Modified independent, cleaning and garment management, toilet transfer, within precautions  OT goal: meal preparation: Independent, ambulatory level, Modified independent, with simple meal preparation, within precautions  OT goal: home management: Independent, with light demand household tasks, Modified independent, within precautions, ambulatory level  OT goal: cognitive: Patient/caregiver will verbalize understanding of cognitive assessment results/recommendations as needed for safe discharge planning  OT goal 1: SBA with shower transf. utilizing A.E./DME prn.  OT/YOGI face-to-face collaboration occurred, patient progress and plan of care reviewed.     PT Frequency: daily for  minutes  PT goal: bed mobility: Independent  PT goal: transfers: Independent, Sit to/from stand, Bed to/from chair  PT goal: gait: 150 feet, Modified independent  PT goal: stairs: Greater than 10 stairs, Rail on left, Supervision/stand-by assist  PT goal: perform aerobic activity with stable cardiovascular response: 15 minutes, NuStep, continuous activity  PT goal 1: Pt will demonstrate I with use of HEP to improve balane and reduce risk for falls; may require supervision for safety.  PT Goal 2: Pt will complete floor transfer with SBA in order to recover from a potential fall without further risk for injury.  PT Goal 3: Pt will demonstrate improved FGA score to greater than 23/30 in order to demonstrate decreased risk for falls.  PT Goal 4: Pt will report 3 ways to reduce the risk of falls at home in order reduce risk of injury to self.           SLP Frequency: daily  SLP Swallow Goal:  Safely tolerate diet without signs/symptoms of aspiration: regular diet, thin liquids, with use of swallow precautions, independently   SLP goal 1: SLP: pt to improve moderate complex word finding 90% accuracy for increased ability to  express self  SLP goal 2: SLP: pt to improve written expression at sentence level 80% accuracy  SLP goal 3: SLP: pt to improve reading comprehension at multi sentence level 90% accuracy for increased ability to understand medical/functional information       Goal: Skin Integrity: pt will remain free of pressure injuries or skin infections through out ARU stay     Goal: Safety Management: pt will remain free of falls through out ARU stay     Individualized Goal 1: Pt will be independent in her room by 3/7

## 2021-03-05 NOTE — PROGRESS NOTES
Attended team rounds this morning, dtr at bedside. Discussed updated and discharge plans. Team left and SWer stayed back. Dtr wondering about how long pt will need the close supervision. Discussed getting into a routine at home, monitoring for safety once home, working with OP therapist and anticipate AT LEAST 1-2 weeks. Encouraged dtr to discuss with therapist as well on Mon during family training.     Discussed other safety devices for discharge. Discussed life alert, apple watch with fall detection and even setting up security cameras if it makes everyone feel more comfortable. Did acknowledge pt privacy as well. Pt did not object to any of the idea and expressed wanting her dtr and family to feel comfortable as they are supporting her.     SW plans to follow up on Monday before discharge to answer any other questions and to provide pt with IMM. Life alert, senior linkage line and other resources added to pt AVS.     Leeanne Tan, OPAL, [facility] Acute Rehab Unit   Phone: [phone]  I   Pager: [phone]

## 2021-03-05 NOTE — PLAN OF CARE
Discharge Planner Post-Acute Rehab SLP:      Discharge Plan: home with assist, and further SLP tx     Precautions: fall     Current Status:  Communication: mild/moderate aphasia for verbal and written expression, reading comprehension  Cognition: not formally assessed 2/2 aphasia  Swallow: on NDD3 diet, thin fluids with goal to advance to regular diet     Assessment:  Patient seen for communication treatment (-15 minutes due to rounds meeting). Patient participated in convergent naming task completing with 75% accuracy. Patient also completed generative naming task stating 1-2 items given moderate cues. Patient copied words (4-5 letter length) with 82% accuracy for legibility/accuracy. Note fatigue affected performance as task went on.    Other Barriers to Discharge (Family Training, etc): TBD

## 2021-03-05 NOTE — PLAN OF CARE
Patient ambulated to the bathroom with SBA.    WBC increased to 18 today. MD's aware. Afebrile, denies cough, painful urination or frequency. Incision appears to be healing well, no signs of infection noted.  CBC ordered for tomorrow.  Patient is worried about not sleeping well.  MD stated to try tylenol and ativan at bedtime. If not sleeping 2 hours later (midnight), try atarax.

## 2021-03-06 ENCOUNTER — APPOINTMENT (OUTPATIENT)
Dept: PHYSICAL THERAPY | Facility: CLINIC | Age: 76
End: 2021-03-06
Payer: MEDICARE

## 2021-03-06 ENCOUNTER — APPOINTMENT (OUTPATIENT)
Dept: OCCUPATIONAL THERAPY | Facility: CLINIC | Age: 76
End: 2021-03-06
Payer: MEDICARE

## 2021-03-06 ENCOUNTER — APPOINTMENT (OUTPATIENT)
Dept: SPEECH THERAPY | Facility: CLINIC | Age: 76
End: 2021-03-06
Payer: MEDICARE

## 2021-03-06 PROCEDURE — 97110 THERAPEUTIC EXERCISES: CPT | Mod: GP | Performed by: REHABILITATION PRACTITIONER

## 2021-03-06 PROCEDURE — 94660 CPAP INITIATION&MGMT: CPT

## 2021-03-06 PROCEDURE — 97535 SELF CARE MNGMENT TRAINING: CPT | Mod: GO

## 2021-03-06 PROCEDURE — 250N000012 HC RX MED GY IP 250 OP 636 PS 637: Performed by: PHYSICIAN ASSISTANT

## 2021-03-06 PROCEDURE — 97112 NEUROMUSCULAR REEDUCATION: CPT | Mod: GP | Performed by: REHABILITATION PRACTITIONER

## 2021-03-06 PROCEDURE — 999N000157 HC STATISTIC RCP TIME EA 10 MIN

## 2021-03-06 PROCEDURE — 97116 GAIT TRAINING THERAPY: CPT | Mod: GP | Performed by: REHABILITATION PRACTITIONER

## 2021-03-06 PROCEDURE — 92507 TX SP LANG VOICE COMM INDIV: CPT | Mod: GN | Performed by: SPEECH-LANGUAGE PATHOLOGIST

## 2021-03-06 PROCEDURE — 92526 ORAL FUNCTION THERAPY: CPT | Mod: GN | Performed by: SPEECH-LANGUAGE PATHOLOGIST

## 2021-03-06 PROCEDURE — 128N000003 HC R&B REHAB

## 2021-03-06 PROCEDURE — 250N000013 HC RX MED GY IP 250 OP 250 PS 637: Performed by: PHYSICIAN ASSISTANT

## 2021-03-06 RX ADMIN — BUSPIRONE HYDROCHLORIDE 30 MG: 15 TABLET ORAL at 08:57

## 2021-03-06 RX ADMIN — LEVETIRACETAM 500 MG: 500 TABLET, FILM COATED ORAL at 08:57

## 2021-03-06 RX ADMIN — DEXAMETHASONE 4 MG: 2 TABLET ORAL at 06:28

## 2021-03-06 RX ADMIN — LISINOPRIL 2.5 MG: 2.5 TABLET ORAL at 08:57

## 2021-03-06 RX ADMIN — DEXAMETHASONE 4 MG: 2 TABLET ORAL at 14:03

## 2021-03-06 RX ADMIN — FAMOTIDINE 20 MG: 20 TABLET, FILM COATED ORAL at 21:13

## 2021-03-06 RX ADMIN — LEVETIRACETAM 500 MG: 500 TABLET, FILM COATED ORAL at 21:14

## 2021-03-06 RX ADMIN — FLUOXETINE 80 MG: 20 CAPSULE ORAL at 08:57

## 2021-03-06 RX ADMIN — FAMOTIDINE 20 MG: 20 TABLET, FILM COATED ORAL at 08:57

## 2021-03-06 RX ADMIN — LORAZEPAM 0.5 MG: 0.5 TABLET ORAL at 21:13

## 2021-03-06 RX ADMIN — BUSPIRONE HYDROCHLORIDE 30 MG: 15 TABLET ORAL at 21:13

## 2021-03-06 RX ADMIN — DEXAMETHASONE 4 MG: 2 TABLET ORAL at 21:13

## 2021-03-06 RX ADMIN — AMLODIPINE BESYLATE 10 MG: 10 TABLET ORAL at 08:57

## 2021-03-06 RX ADMIN — ACETAMINOPHEN 500 MG: 500 TABLET ORAL at 21:13

## 2021-03-06 RX ADMIN — MELATONIN TAB 3 MG 3 MG: 3 TAB at 21:13

## 2021-03-06 NOTE — PLAN OF CARE
Discharge Planner Post-Acute Rehab PT:     Discharge Plan: home with dtr assist vs to dtr home with 24/7 assist OPPT     Precautions: fall, R neglect-mild, needs cues to rest    Current Status:  Bed Mobility: IND   Transfer: mod I   Gait: mod I in room - SBA in hallway. No A.D   Stairs: 4x4 steps with one rail   Balance: IND sitting. SBA to mod I standing.     Assessment:  pt is progressing well  with higher level gait and balance tasks. pt cont to needing extra time for multi step commands.     Other Barriers to Discharge (DME, Family Training, etc): potential need for family training.

## 2021-03-06 NOTE — PLAN OF CARE
FOCUS/GOAL  Bladder management, Pain management, Mobility, Cognition/Memory/Judgment/Problem solving, and Safety management    ASSESSMENT, INTERVENTIONS AND CONTINUING PLAN FOR GOAL:  Pt is alert and oriented with word-finding difficulties. Denied pain or discomfort this shift. Up SBA at night. Voiding spontaneously using toilet. Appeared to be sleeping well during rounds. Uses call light appropriately, able to make needs known. Bed alarm on for safety at night. Will continue with POC.

## 2021-03-06 NOTE — PLAN OF CARE
FOCUS/GOAL  Bowel management, Bladder management, Mobility, and Safety management    ASSESSMENT, INTERVENTIONS AND CONTINUING PLAN FOR GOAL:  Patient is alert and oriented x 4. Denied pain early in the shift but requested for Tylenol at hs for headache. Independent in her room during the day and will need assistance at night. Continent of both bladder and bowel. Incision on head is dry and intact with staples. Upset at hs when she was transferred to a different room. Was crying and reported the incident to her daughter. Daughter called back and wanted patient to be given an additional dose of Ativan. One time dose of 0.5 mg of Ativan given as ordered on top of PRN 0.5 mg. Patient is calm and resting in bed at this time. Will continue to monitor.

## 2021-03-06 NOTE — PROGRESS NOTES
Discharge Planner Post-Acute Rehab OT:      Discharge Plan: home; to dtr.s home initially     Precautions: post op crani precautions, falls, right sided inattention     Current Status:  ADLs: SBA with UB/LB dressing, SBA for toileting, SBA/min A standing at sink for g/h-assistance d/t surgical incision, SBA/CGA amb. without AD throughout room/bathroom/hallway.  Functional transfers and mobility: SBA for safety, iraj out in hallway due to slightly unsteady  IADLs: Requires supervision and mod to max A with use of computer; decreased coordination and tracking mouse on screen. .   Vision/Cognition: not yet formally tested. Wears glasses. Right sided inattention. word finding deficits noted.     Assessment: Pt continues to require cues to recall craniotomy precautions with morning ADLs. Continuing to note impaired sequencing, sorting, and right sided inattention skills during ADLs. Pt demonstrating difficulty with med mgmt task, pt would benefit from MAP program with nursing prior to discharge. Recommending ongoing OT services to maximize IND and safety with ADLs/IADLs and functional mobility, and further assess cognition.      Other Barriers to Discharge (DME, Family Training, etc): Family training Monday 9-11 A.M. Pt. has walk in shower, shower chair and grab bars

## 2021-03-06 NOTE — PLAN OF CARE
FOCUS/GOAL  Bowel management, Bladder management, Pain management, and Medical management    ASSESSMENT, INTERVENTIONS AND CONTINUING PLAN FOR GOAL:  Patient A&O x4 but can be forgetful. Ind in room during day and SBA at [facility]. No complaints of pain. Continent of bowel and bladder. LBM 3/6 per pt report. Pt expressed frustrations about switching rooms at 2100 last night but seemed to be in better spirits overall. MAP started today, pt able to call appropriately for 1400 medication. Daughter updated via phone call about plan of care. Continue w/ plan of care.

## 2021-03-06 NOTE — PLAN OF CARE
Discharge Planner Post-Acute Rehab SLP:      Discharge Plan: home with assist, and further SLP tx     Precautions: fall     Current Status:  Communication: mild/moderate aphasia for verbal and written expression, reading comprehension  Cognition: not formally assessed 2/2 aphasia  Swallow: on NDD3 diet, thin fluids with goal to advance to regular diet     Assessment: Did well with assessing to upgrade to regular diet, will plan one more meal before potentially changing. Much difficulty with spelling/copying written words, approximately 50% letters correct even with model. Reading comprehension paragraph 80% accuracy for related words.  Mild difficulty naming items in given categories..  Other Barriers to Discharge (Family Training, etc): TBD

## 2021-03-06 NOTE — PLAN OF CARE
"Communicated to this RN by the Rehab admission staff that the pt need to be transferred to another room on evening shift 3/5/21  , communicated to staff about the plan , this RN checked on patient at 2100 and she was still on the same room 504 , staff asked pt if she is willing to transfer to another room and explained to her the rationale behind it , pt agreed  With te transfer to  but patient told her daughter that she was forced to transfer per RN assessment pt is alert but with word finding difficulty so when she agreed to  Moving  It means that she is really in agreement with the move this staff transported the pt via wheel chair to the new room and with the help of the NA all belongings were delivered to the new room and even have the patient checked the belongings to make sure she got it al   Then this RN received a call from her daughter and she said she is upset about the move because it was already late in the evening it was told to her that her MOM agreed ,pt's daughter said \"MY MOM agreed on everything that you asked her \" she said her MOM is confused , this RN apologized to the daughter and to the patient . Patient is amari after this staff apologized to the patient and the RN updated the administrative RN supervisor .  "

## 2021-03-07 ENCOUNTER — APPOINTMENT (OUTPATIENT)
Dept: PHYSICAL THERAPY | Facility: CLINIC | Age: 76
End: 2021-03-07
Payer: MEDICARE

## 2021-03-07 ENCOUNTER — APPOINTMENT (OUTPATIENT)
Dept: SPEECH THERAPY | Facility: CLINIC | Age: 76
End: 2021-03-07
Payer: MEDICARE

## 2021-03-07 ENCOUNTER — APPOINTMENT (OUTPATIENT)
Dept: OCCUPATIONAL THERAPY | Facility: CLINIC | Age: 76
End: 2021-03-07
Payer: MEDICARE

## 2021-03-07 PROCEDURE — 99231 SBSQ HOSP IP/OBS SF/LOW 25: CPT | Performed by: PHYSICAL MEDICINE & REHABILITATION

## 2021-03-07 PROCEDURE — 92508 TX SP LANG VOICE COMM GROUP: CPT | Mod: GN | Performed by: SPEECH-LANGUAGE PATHOLOGIST

## 2021-03-07 PROCEDURE — 128N000003 HC R&B REHAB

## 2021-03-07 PROCEDURE — 97116 GAIT TRAINING THERAPY: CPT | Mod: GP | Performed by: REHABILITATION PRACTITIONER

## 2021-03-07 PROCEDURE — 97530 THERAPEUTIC ACTIVITIES: CPT | Mod: GP | Performed by: REHABILITATION PRACTITIONER

## 2021-03-07 PROCEDURE — 94660 CPAP INITIATION&MGMT: CPT

## 2021-03-07 PROCEDURE — 92526 ORAL FUNCTION THERAPY: CPT | Mod: GN | Performed by: SPEECH-LANGUAGE PATHOLOGIST

## 2021-03-07 PROCEDURE — 97535 SELF CARE MNGMENT TRAINING: CPT | Mod: GO

## 2021-03-07 PROCEDURE — 92507 TX SP LANG VOICE COMM INDIV: CPT | Mod: GN | Performed by: SPEECH-LANGUAGE PATHOLOGIST

## 2021-03-07 PROCEDURE — 250N000013 HC RX MED GY IP 250 OP 250 PS 637: Performed by: PHYSICIAN ASSISTANT

## 2021-03-07 PROCEDURE — 250N000012 HC RX MED GY IP 250 OP 636 PS 637: Performed by: PHYSICIAN ASSISTANT

## 2021-03-07 PROCEDURE — 999N000157 HC STATISTIC RCP TIME EA 10 MIN

## 2021-03-07 RX ADMIN — AMLODIPINE BESYLATE 10 MG: 10 TABLET ORAL at 09:12

## 2021-03-07 RX ADMIN — DEXAMETHASONE 4 MG: 2 TABLET ORAL at 14:14

## 2021-03-07 RX ADMIN — DEXAMETHASONE 4 MG: 2 TABLET ORAL at 05:57

## 2021-03-07 RX ADMIN — MELATONIN TAB 3 MG 3 MG: 3 TAB at 21:10

## 2021-03-07 RX ADMIN — LEVETIRACETAM 500 MG: 500 TABLET, FILM COATED ORAL at 09:12

## 2021-03-07 RX ADMIN — BUSPIRONE HYDROCHLORIDE 30 MG: 15 TABLET ORAL at 21:08

## 2021-03-07 RX ADMIN — LEVETIRACETAM 500 MG: 500 TABLET, FILM COATED ORAL at 21:10

## 2021-03-07 RX ADMIN — FAMOTIDINE 20 MG: 20 TABLET, FILM COATED ORAL at 09:11

## 2021-03-07 RX ADMIN — BUSPIRONE HYDROCHLORIDE 30 MG: 15 TABLET ORAL at 09:12

## 2021-03-07 RX ADMIN — DEXAMETHASONE 4 MG: 2 TABLET ORAL at 21:09

## 2021-03-07 RX ADMIN — ACETAMINOPHEN 1000 MG: 500 TABLET ORAL at 21:16

## 2021-03-07 RX ADMIN — FAMOTIDINE 20 MG: 20 TABLET, FILM COATED ORAL at 21:10

## 2021-03-07 RX ADMIN — FLUOXETINE 80 MG: 20 CAPSULE ORAL at 09:13

## 2021-03-07 RX ADMIN — LISINOPRIL 2.5 MG: 2.5 TABLET ORAL at 09:11

## 2021-03-07 NOTE — PLAN OF CARE
Discharge Planner Post-Acute Rehab PT:     Discharge Plan:  home with dtr assist vs to dtr home with 24/7 assist OPPT     Precautions: fall, R neglect-mild, needs cues to rest    Current Status:  Bed Mobility: IND   Transfer: IND   Gait: 300+ no A.D SBA to IND.   Stairs: 4x4 one rail step over pattern, SBA to IDN.   Balance: SBA to IDN.     Assessment: pt has progressed well with all functional mobilty. Pt met all functional PT goals at this time. Pt to stay at Drt home with assist as needed.     Other Barriers to Discharge (DME, Family Training, etc): none

## 2021-03-07 NOTE — PLAN OF CARE
Discharge Planner Post-Acute Rehab SLP:      Discharge Plan: home with assist, and further SLP tx     Precautions: fall     Current Status:  Communication: mild/moderate aphasia for verbal and written expression, reading comprehension  Cognition: not formally assessed 2/2 aphasia  Swallow: regular diet, thin fluids     Assessment: sLP: pt seen for meal, regular diet assessment, did well, one time slight throat clear, occasional cues to take small bites, diet advanced to regular, sit up for po, small bites and sips, extra caution with very hard food (ie fresh fruit).   Continued to address language deficits, mild difficulty naming, and describing. Also noted word finding difficulty when ordering meal.  Spelling remains difficult, even when given letters to choose from.   Barriers to discharge:

## 2021-03-07 NOTE — PLAN OF CARE
FOCUS/GOAL  Pain management and Safety management    ASSESSMENT, INTERVENTIONS AND CONTINUING PLAN FOR GOAL:  Patient appeared comfortable and sleeping during rounds. No signs/symptoms of pain noted. CPAP in use. Bed alarm on.

## 2021-03-07 NOTE — PLAN OF CARE
FOCUS/GOAL  Bowel management, Bladder management, Pain management, and Mobility    ASSESSMENT, INTERVENTIONS AND CONTINUING PLAN FOR GOAL:  Patient is alert and oriented x 4. Denied pain. Independent in her room during the day and will need assistance at night. Continent of both bowel and bladder using the toilet in the bathroom. Had a shower this evening. Was able to call at hs for her meds. She picked the correct meds. PRN Tylenol and Ativan given with hs meds per request. Tylenol was for headache and Ativan was to help her sleep. Aware that her bed alarm will be on at hs.

## 2021-03-07 NOTE — PLAN OF CARE
Physical Therapy Discharge Summary    Reason for therapy discharge:    Discharged to home with outpatient therapy.    Progress towards therapy goal(s). See goals on Care Plan in [facility] electronic health record for goal details.  Goals met    Therapy recommendation(s):    Continued therapy is recommended.  Rationale/Recommendations:  Ongoing PT in OP setting to progress gait and mobility to community level.

## 2021-03-07 NOTE — PROGRESS NOTES
Discharge Planner Post-Acute Rehab OT:      Discharge Plan: home; to dtr.s home initially, OP therapy     Precautions: post op crani precautions, falls, right sided inattention     Current Status:  Demo'ing increased recall of craniotomy precautions  ADLs: set up for shower completed this day, set up with UB/LB dressing, min A to fasten bra;  SBA for toileting, SBA/min A standing at sink for g/h-assistance d/t surgical incision, but does comb own hair this day   SBA for amb. without AD in hallway.  Functional transfers and mobility: mod indep in room  IADLs: Requires supervision and mod to max A with use of computer; decreased coordination and tracking mouse on screen. .   Vision/Cognition: not yet formally tested. Wears glasses. Right sided inattention. word finding deficits noted.     Assessment: Pt recaledl craniotomy precautions, accurately, with morning ADLs.   Continuing to note impaired sequencing, sorting, and right sided inattention skills during ADLs. Pt demonstrating difficulty with med mgmt task, pt would benefit from MAP program with nursing prior to discharge.  Recommending ongoing OT services to maximize IND and safety with ADLs/IADLs and functional mobility, and further assess cognition.      Other Barriers to Discharge (DME, Family Training, etc): Family training Monday 9-11 A.M. Pt. has walk in shower, shower chair and grab bars

## 2021-03-07 NOTE — PROGRESS NOTES
PM&R PROGRESS NOTE     Patient Active Problem List   Diagnosis     NONSPECIFIC MEDICAL HISTORY     Need for prophylactic hormone replacement therapy (postmenopausal)     Other injury of other sites of trunk     Injury to other specified nerve(s) of shoulder girdle and upper limb     Adjustment disorder with depressed mood     CARDIOVASCULAR SCREENING; LDL GOAL LESS THAN 160     Brain mass, 3.3 cm left Frontal Parietal      Chronic obstructive pulmonary disease (H)     Major depression     Essential hypertension     Anxiety     S/P craniotomy       HPI   Olga Bailey is a 75 year old female admitted to the ARU on 3/1/2021    She has a history of      From the functional perspective, she presents with mild to moderate aphasia for verbal and written expression and reading comprehension.  In PT she ambulated independently without an assistive device.  She is on a regular diet with thins.  She has been standby assist with basic ADLs.  Requires supervision for IADLs.  She has been independent in her room without an assistive device for the last 2 days and has done very well.  She is looking forward to being discharged      Medically,   Insomnia; denies prior history of insomnia and endorses that this is a hospital phenomena for her.  Continue melatonin.  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  Continue Keppra for seizure prophylaxis for 6 weeks (neurosurgery.  Blood pressures well controlled she is currently on low-dose lisinopril remain unchanged.  Leukocytosis likely steroid-induced and within 20 K.  Continue to monitor    Orders Placed This Encounter      Regular Diet Adult Thin Liquids (water, ice chips, juice, milk, gelatin, ice cream, etc)        Review Of Systems  Total of ten systems reviewed, pertinent positives and negatives as follows  Denies chest pain fever chills rigors  Denies any shortness of breath   Denies any nausea or vomiting   Appetite poor  Denies any  "lightheadedness or dizziness     Orders Placed This Encounter      Regular Diet Adult Thin Liquids (water, ice chips, juice, milk, gelatin, ice cream, etc)    Denies any pain    Denies any sob or cough   Denies any new rashes   Mood euphoric   Voiding without any problems, no incontinence   Slept well last night   Remainder of the review of the systems was negative        /64 (BP Location: Left arm)   Pulse 77   Temp 96.8  F (36  C) (Oral)   Resp 16   Ht 1.6 m (5' 3\")   Wt 69.8 kg (153 lb 14.4 oz)   SpO2 96%   BMI 27.26 kg/m    Physical exam    Patient is sitting/lying in bed comfortable in no acute distress   HEENT NC AT PRTL EOM good   Neck supple  Heart S1S2  Lungs CTA  Abdomen  benign BS positive NT NR   LE no edema        Right hand dorsum with marked bluish swelling from the mid.  Nonpulsatile.  No further bleeding noted.    Current Facility-Administered Medications   Medication     - Medication Assessment Program - Rehab Services     acetaminophen (TYLENOL) tablet 500-1,000 mg     amLODIPine (NORVASC) tablet 10 mg     bisacodyl (DULCOLAX) Suppository 10 mg     busPIRone (BUSPAR) tablet 30 mg     calcium carbonate (TUMS) chewable tablet 500 mg     [START ON 3/9/2021] dexamethasone (DECADRON) tablet 2 mg     dexamethasone (DECADRON) tablet 4 mg     docusate sodium (COLACE) capsule 100 mg     famotidine (PEPCID) tablet 20 mg     FLUoxetine (PROzac) capsule 80 mg     hydrALAZINE (APRESOLINE) tablet 10 mg     hydrOXYzine (ATARAX) tablet 25-50 mg    Or     hydrOXYzine (ATARAX) tablet 50 mg     levETIRAcetam (KEPPRA) tablet 500 mg     lisinopril (ZESTRIL) tablet 2.5 mg     LORazepam (ATIVAN) tablet 0.5 mg     melatonin tablet 3 mg     oxymetazoline (AFRIN) 0.05 % spray 2 spray     senna-docusate (SENOKOT-S/PERICOLACE) 8.6-50 MG per tablet 1 tablet     sennosides (SENOKOT) tablet 2 tablet        Labs:  Lab Results   Component Value Date    WBC 18.4 (H) 03/05/2021    HGB 10.2 (L) 03/05/2021    HCT 31.1 " (L) 03/05/2021     03/05/2021     03/04/2021    POTASSIUM 4.3 03/04/2021    CHLORIDE 104 03/04/2021    CO2 24 03/04/2021    BUN 32 (H) 03/04/2021    CR 0.62 03/04/2021     (H) 03/04/2021    AST 24 02/18/2021    ALT 35 02/18/2021    ALKPHOS 87 02/18/2021    BILITOTAL 0.3 02/18/2021    INR 1.02 02/22/2021       Attestation:  This patient has been seen and evaluated by me, Alyssa Syed MD.      Alyssa Syed MD, [facility]   Department of Rehabilitation

## 2021-03-07 NOTE — PLAN OF CARE
FOCUS/GOAL  Cognition/Memory/Judgment/Problem solving    ASSESSMENT, INTERVENTIONS AND CONTINUING PLAN FOR GOAL:  Patient A&O x4. Forgetful. Ind in room during day. Continent of bowel and bladder. No complaints of pain. L head incision WDL and YOGI. Able to call for AM MAP meds after light reminder from SLP. Pt able to pick out meds 90% correctly. Called and picked out PM med appropriately. Able to have a shower during shift. After shower, pt and therapist noted a bruised and raised area on R hand. Writer came in and applied ice and elevated hand. Provider notified. Upon re-check, area was no longer raised but still bruised. Plan to discharge tomorrow after family training. Continue w/ plan of care.

## 2021-03-08 ENCOUNTER — APPOINTMENT (OUTPATIENT)
Dept: OCCUPATIONAL THERAPY | Facility: CLINIC | Age: 76
End: 2021-03-08
Payer: MEDICARE

## 2021-03-08 ENCOUNTER — APPOINTMENT (OUTPATIENT)
Dept: SPEECH THERAPY | Facility: CLINIC | Age: 76
End: 2021-03-08
Payer: MEDICARE

## 2021-03-08 ENCOUNTER — PATIENT OUTREACH (OUTPATIENT)
Dept: CARE COORDINATION | Facility: CLINIC | Age: 76
End: 2021-03-08

## 2021-03-08 ENCOUNTER — APPOINTMENT (OUTPATIENT)
Dept: PHYSICAL THERAPY | Facility: CLINIC | Age: 76
End: 2021-03-08
Payer: MEDICARE

## 2021-03-08 VITALS
HEIGHT: 63 IN | WEIGHT: 154 LBS | DIASTOLIC BLOOD PRESSURE: 57 MMHG | HEART RATE: 85 BPM | TEMPERATURE: 96.3 F | BODY MASS INDEX: 27.29 KG/M2 | OXYGEN SATURATION: 97 % | SYSTOLIC BLOOD PRESSURE: 149 MMHG | RESPIRATION RATE: 18 BRPM

## 2021-03-08 LAB
ANION GAP SERPL CALCULATED.3IONS-SCNC: 5 MMOL/L (ref 3–14)
BUN SERPL-MCNC: 31 MG/DL (ref 7–30)
CALCIUM SERPL-MCNC: 8.6 MG/DL (ref 8.5–10.1)
CHLORIDE SERPL-SCNC: 100 MMOL/L (ref 94–109)
CO2 SERPL-SCNC: 28 MMOL/L (ref 20–32)
CREAT SERPL-MCNC: 0.6 MG/DL (ref 0.52–1.04)
ERYTHROCYTE [DISTWIDTH] IN BLOOD BY AUTOMATED COUNT: 13.9 % (ref 10–15)
GFR SERPL CREATININE-BSD FRML MDRD: 89 ML/MIN/{1.73_M2}
GLUCOSE SERPL-MCNC: 109 MG/DL (ref 70–99)
HCT VFR BLD AUTO: 31.8 % (ref 35–47)
HGB BLD-MCNC: 10.4 G/DL (ref 11.7–15.7)
MCH RBC QN AUTO: 29.4 PG (ref 26.5–33)
MCHC RBC AUTO-ENTMCNC: 32.7 G/DL (ref 31.5–36.5)
MCV RBC AUTO: 90 FL (ref 78–100)
PLATELET # BLD AUTO: 244 10E9/L (ref 150–450)
POTASSIUM SERPL-SCNC: 4.2 MMOL/L (ref 3.4–5.3)
RBC # BLD AUTO: 3.54 10E12/L (ref 3.8–5.2)
SODIUM SERPL-SCNC: 133 MMOL/L (ref 133–144)
WBC # BLD AUTO: 14.9 10E9/L (ref 4–11)

## 2021-03-08 PROCEDURE — 92526 ORAL FUNCTION THERAPY: CPT | Mod: GN | Performed by: SPEECH-LANGUAGE PATHOLOGIST

## 2021-03-08 PROCEDURE — 250N000012 HC RX MED GY IP 250 OP 636 PS 637: Performed by: PHYSICIAN ASSISTANT

## 2021-03-08 PROCEDURE — 92507 TX SP LANG VOICE COMM INDIV: CPT | Mod: GN | Performed by: SPEECH-LANGUAGE PATHOLOGIST

## 2021-03-08 PROCEDURE — 97530 THERAPEUTIC ACTIVITIES: CPT | Mod: GP

## 2021-03-08 PROCEDURE — 85027 COMPLETE CBC AUTOMATED: CPT | Performed by: PHYSICIAN ASSISTANT

## 2021-03-08 PROCEDURE — 97535 SELF CARE MNGMENT TRAINING: CPT | Mod: GO | Performed by: STUDENT IN AN ORGANIZED HEALTH CARE EDUCATION/TRAINING PROGRAM

## 2021-03-08 PROCEDURE — 80048 BASIC METABOLIC PNL TOTAL CA: CPT | Performed by: PHYSICIAN ASSISTANT

## 2021-03-08 PROCEDURE — 94660 CPAP INITIATION&MGMT: CPT

## 2021-03-08 PROCEDURE — 99238 HOSP IP/OBS DSCHRG MGMT 30/<: CPT | Performed by: PHYSICAL MEDICINE & REHABILITATION

## 2021-03-08 PROCEDURE — 250N000013 HC RX MED GY IP 250 OP 250 PS 637: Performed by: PHYSICIAN ASSISTANT

## 2021-03-08 PROCEDURE — 36415 COLL VENOUS BLD VENIPUNCTURE: CPT | Performed by: PHYSICIAN ASSISTANT

## 2021-03-08 PROCEDURE — 999N000157 HC STATISTIC RCP TIME EA 10 MIN

## 2021-03-08 RX ORDER — LORAZEPAM 0.5 MG/1
0.5 TABLET ORAL 2 TIMES DAILY PRN
Qty: 15 TABLET | Refills: 0 | Status: SHIPPED | OUTPATIENT
Start: 2021-03-08 | End: 2021-03-23

## 2021-03-08 RX ORDER — DEXAMETHASONE 2 MG/1
TABLET ORAL
Qty: 92 TABLET | Refills: 0 | Status: ON HOLD | OUTPATIENT
Start: 2021-03-08 | End: 2021-03-27

## 2021-03-08 RX ORDER — DEXAMETHASONE 2 MG/1
TABLET ORAL
Qty: 92 TABLET | Refills: 0 | Status: SHIPPED | OUTPATIENT
Start: 2021-03-08 | End: 2021-03-08

## 2021-03-08 RX ORDER — HYDROXYZINE HYDROCHLORIDE 25 MG/1
25-50 TABLET, FILM COATED ORAL EVERY 6 HOURS PRN
Qty: 20 TABLET | Refills: 0 | Status: ON HOLD | OUTPATIENT
Start: 2021-03-08 | End: 2021-05-15

## 2021-03-08 RX ORDER — LEVETIRACETAM 500 MG/1
500 TABLET ORAL 2 TIMES DAILY
Qty: 60 TABLET | Refills: 0 | Status: ON HOLD | OUTPATIENT
Start: 2021-03-08 | End: 2021-03-27

## 2021-03-08 RX ORDER — AMLODIPINE BESYLATE 10 MG/1
10 TABLET ORAL DAILY
Qty: 30 TABLET | Refills: 0 | Status: ON HOLD | OUTPATIENT
Start: 2021-03-08 | End: 2021-05-15

## 2021-03-08 RX ORDER — LISINOPRIL 2.5 MG/1
2.5 TABLET ORAL DAILY
Qty: 30 TABLET | Refills: 0 | Status: SHIPPED | OUTPATIENT
Start: 2021-03-09 | End: 2021-03-23

## 2021-03-08 RX ORDER — FAMOTIDINE 20 MG/1
20 TABLET, FILM COATED ORAL 2 TIMES DAILY
Qty: 60 TABLET | Refills: 0 | Status: ON HOLD | OUTPATIENT
Start: 2021-03-08 | End: 2021-04-19

## 2021-03-08 RX ADMIN — LEVETIRACETAM 500 MG: 500 TABLET, FILM COATED ORAL at 07:38

## 2021-03-08 RX ADMIN — FAMOTIDINE 20 MG: 20 TABLET, FILM COATED ORAL at 07:38

## 2021-03-08 RX ADMIN — FLUOXETINE 80 MG: 20 CAPSULE ORAL at 07:38

## 2021-03-08 RX ADMIN — LISINOPRIL 2.5 MG: 2.5 TABLET ORAL at 07:38

## 2021-03-08 RX ADMIN — AMLODIPINE BESYLATE 10 MG: 10 TABLET ORAL at 07:38

## 2021-03-08 RX ADMIN — BUSPIRONE HYDROCHLORIDE 30 MG: 15 TABLET ORAL at 07:38

## 2021-03-08 RX ADMIN — DEXAMETHASONE 4 MG: 2 TABLET ORAL at 06:02

## 2021-03-08 ASSESSMENT — MIFFLIN-ST. JEOR: SCORE: 1162.67

## 2021-03-08 NOTE — PLAN OF CARE
"Speech Language Therapy Discharge Summary    Reason for therapy discharge:    Discharged to home with outpatient therapy.    Progress towards therapy goal(s). See goals on Care Plan in [facility] electronic health record for goal details.  Goals met    Therapy recommendation(s):    Continued therapy is recommended.  Rationale/Recommendations:   .SLP: pt has been seen for swallowing and communication.  Pt has been advanced to regular diet, but did report some discomfort with meat piece \"stuck in throat\" day prior. Unable to determine if esophageal/pharyngeal.  Discussed NDD3 diet items, which pt may want to choose from at home, even though can have regular diet.  May want to avoid tougher, drier items.  Pt should take small bites, slow pace, frequent sips.  Also address language with mild aphasia for word finding, auditory and reading comprehension. Noting moderate difficulty with spelling/written expression. Did not formally assess cognitive linguistic skills, can do so as aphasia improves.  Pt to have support at home for IADLS      "

## 2021-03-08 NOTE — PLAN OF CARE
Occupational Therapy Discharge Summary    Reason for therapy discharge:    All goals and outcomes met, no further needs identified.    Progress towards therapy goal(s). See goals on Care Plan in [facility] electronic health record for goal details.  Goals met    Therapy recommendation(s):    Continued therapy is recommended.  Rationale/Recommendations:  Pt is discharging with 24/7 supervision and assist with IADL. Pt will attend OP OT for cognition, R side deficits.

## 2021-03-08 NOTE — PLAN OF CARE
Patient was discharged from unit at approx 1200. Patient's daughter was present for family teaching beforehand. Discharge AVS was done at bedside with patient and patient's daughter verbalizing understanding. Discharge medications given to patient. All of patient belongings were packed and sent home with patient and patient's daughter. Patient left unit with SBA only. Was assisted into car with CGA.

## 2021-03-08 NOTE — PLAN OF CARE
FOCUS/GOAL  Medical management    ASSESSMENT, INTERVENTIONS AND CONTINUING PLAN FOR GOAL:  Pt is alert and oriented. No complaints of pain. SBA NOC with no device, but independent during day. Continent of bladder using toilet in the bathroom. Pt is excited to go home and verbalized that she feels very comfortable going home with her daughter. Appeared to be sleeping on rounds.

## 2021-03-08 NOTE — PROGRESS NOTES
Home today with OP. Dtr at bedside for family training this morning. SW met with pt and pt dtr, notified them of the resources that were added to AVS. Pt signed IMM. Denied additional needs. Shared experience from Friday 03/05 when pt was asked to move rooms, without notice and at 9pm. DON notified as pt and pt dtr requesting to speak with DON directly about the situation. No additional SW needs.     Leeanne Tan, OPAL, [facility] Acute Rehab Unit   Phone: [phone]  I   Pager: [phone]

## 2021-03-08 NOTE — DISCHARGE SUMMARY
[facility]   Acute Rehabilitation Unit  Discharge summary     Date of Admission: 3/1/2021  Date of Discharge: 3/8/21  Disposition: home  Primary Care Physician: Enrique Puga  Attending physician: Jl Rosenbaum MD  Other significant physician provider(s): Kary Munoz PA-C      DISCHARGE DIAGNOSIS  S/p Left parietal craniotomy and tumor resection  Glioblastoma  HTN  Leukocytosis  Acute blood loss anemia      BRIEF SUMMARY  Olga Bailey is a 75 year old woman with past medical history of depression, anxiety, asthma, MARCELLE, benign meningioma (2012) who presented with several weeks of word finding/ difficulties expressing self, changes in hand writing MRI revealed left lateral frontoparietal mass with edema she was advised to present to ED was admitted to [facility] 2/17/21 underwent left parietal craniotomy and tumor resection 2/23/21.  Admitted to acute rehab 3/1/21.     REHABILITATION COURSE  Occupational therapy:   Current Status:  Demo'ing increased recall of craniotomy precautions  ADLs: set up for shower completed this day, set up with UB/LB dressing, min A to fasten bra;  SBA for toileting, SBA/min A standing at sink for g/h-assistance d/t surgical incision, but does comb own hair this day   SBA for amb. without AD in hallway.  Functional transfers and mobility: mod indep in room  IADLs: Requires supervision and mod to max A with use of computer; decreased coordination and tracking mouse on screen.   Vision/Cognition: not yet formally tested. Wears glasses. Right sided inattention. word finding deficits noted.     Assessment: Pt recalled craniotomy precautions, accurately, with morning ADLs.   Continuing to note impaired sequencing, sorting, and right sided inattention skills during ADLs. Pt demonstrating difficulty with med mgmt task, pt would benefit from MAP program with nursing prior to discharge.Recommending ongoing OT services to maximize IND  and safety with ADLs/IADLs and functional mobility, and further assess cognition.     Physical Therapy  Discharge Plan:  home with dtr assist vs to dtr home with 24/7 assist OPPT   Current Status:  Bed Mobility: IND   Transfer: IND   Gait: 300+ no A.D SBA to IND.   Stairs: 4x4 one rail step over pattern, SBA to IDN.   Balance: SBA to IDN.    Assessment: pt has progressed well with all functional mobilty. Pt met all functional PT goals at this time. Pt to stay at Drt home with assist as needed.     Speech Therapy  Discharge Plan: home with assist, and further SLP tx   Current Status:  Communication: mild/moderate aphasia for verbal and written expression, reading comprehension  Cognition: not formally assessed 2/2 aphasia  Swallow: regular diet, thin fluids     Assessment: seen for meal, regular diet assessment, did well, one time slight throat clear, occasional cues to take small bites, diet advanced to regular, sit up for po, small bites and sips, extra caution with very hard food (ie fresh fruit).   Continued to address language deficits, mild difficulty naming, and describing. Also noted word finding difficulty when ordering meal.  Spelling remains difficult, even when given letters to choose from.     MEDICAL COURSE  left frontal-parietal mass- pathology consistent with glioblastoma  S/P L parietal craniotomy and excisional biopsy, resection   MRI ordered in clinic found: 3.3 x 2.7 x 2.9 cm intra-axial mass in the lateral left frontoparietal region with  moderate amount of edema w/o midline shift,  unchanged small right occipital meningioma. Presented to ED as directed and was started on IV steroids and seen by neurosurgery. CT CAP without signs of malignancy.   - f/u neurosurgery  - f/u neuro oncology   - keppra 500 mg bid x 6 weeks per neurosurgery recs  - decadron 4 mg tid through today (3/8) then  2 mg tid as per neurosurgery recs.   - keep incision clean and dry- plan for staple removal 3/9/21  -continue  PT/OT/SLP  -tylenol prn     Hypertension- BP's elevated during hospitalization, was started on amlodipine 2/21. possibly due to steroids vs underlying benign essential HTN.  Lisinopril added due to ongoing HTN.   -continue amlodipine, and low dose lisinopril (started 3/2)  -trend BP   -follow up primary care.      Acute anemia- suspected acute blood loss anemia.  Hgb stable 10.4 3/8/21     Leukocytosis:  suspected to be steroid induced. WBC 14.9 3/8/21 ongoing elevation remained Afebrile ROS unremarkable for duration of ARU stay.      Depression  Anxiety  Insomnia  Follows with psychiatry as outpatient is on Wellbutrin, Buspar, and prozac, Wellbutrin held because of potential to lower seizure threshold. Started on PRN lorazepam this hospitalization. Not sleeping well, at least in part due to anxiety.   - prn atarax for sleep anxiety.   - Continue buspirone, fluoxetine.   - Continue PRN PO lorazepam 0.5 mg bid      Asthma-not on medications prior to admission, no sob, lung sounds clear to auscultation   MARCELLE  -continue home cpap    DISCHARGE MEDICATIONS  Current Discharge Medication List      START taking these medications    Details   hydrOXYzine (ATARAX) 25 MG tablet Take 1-2 tablets (25-50 mg) by mouth every 6 hours as needed for anxiety or other (sleep)  Qty: 20 tablet, Refills: 0    Associated Diagnoses: Anxiety      lisinopril (ZESTRIL) 2.5 MG tablet Take 1 tablet (2.5 mg) by mouth daily  Qty: 30 tablet, Refills: 0    Associated Diagnoses: Essential hypertension         CONTINUE these medications which have CHANGED    Details   amLODIPine (NORVASC) 10 MG tablet Take 1 tablet (10 mg) by mouth daily  Qty: 30 tablet, Refills: 0    Associated Diagnoses: Essential hypertension      dexamethasone (DECADRON) 2 MG tablet Take 4 mg by mouth (2 tabs in afternoon and evening 3/8) then on 3/9/21 start 2 mg (1 tablet) three times daily.  Further titration per outpatient providers.  Qty: 92 tablet, Refills: 0    Associated  Diagnoses: S/P craniotomy      famotidine (PEPCID) 20 MG tablet Take 1 tablet (20 mg) by mouth 2 times daily  Qty: 60 tablet, Refills: 0    Associated Diagnoses: Brain mass      levETIRAcetam (KEPPRA) 500 MG tablet Take 1 tablet (500 mg) by mouth 2 times daily  Qty: 60 tablet, Refills: 0    Associated Diagnoses: Brain mass      LORazepam (ATIVAN) 0.5 MG tablet Take 1 tablet (0.5 mg) by mouth 2 times daily as needed for agitation or anxiety  Qty: 15 tablet, Refills: 0    Associated Diagnoses: Anxiety         CONTINUE these medications which have NOT CHANGED    Details   acetaminophen (TYLENOL) 500 MG tablet Take 500 mg by mouth 2 times daily as needed for mild pain      busPIRone HCl (BUSPAR) 30 MG tablet Take 30 mg by mouth 2 times daily      docusate sodium (COLACE) 100 MG capsule Take 1 capsule (100 mg) by mouth 2 times daily as needed for constipation  Qty:      Associated Diagnoses: Brain mass      FLUoxetine (PROZAC) 20 MG capsule Take 80 mg by mouth daily      senna-docusate (SENOKOT-S/PERICOLACE) 8.6-50 MG tablet Take 1 tablet by mouth nightly as needed for constipation  Qty:      Associated Diagnoses: Brain mass               DISCHARGE INSTRUCTIONS AND FOLLOW UP  Discharge Procedure Orders   Occupational Therapy Referral   Standing Status: Future   Referral Priority: Routine Referral Type: Occupational Therapy   Number of Visits Requested: 1     Speech Therapy Referral   Standing Status: Future   Referral Priority: Routine Referral Type: Therapeutic Services   Number of Visits Requested: 1     Physical Therapy Referral   Standing Status: Future   Referral Priority: Routine Referral Type: Rehab Therapy Physical Therapy   Number of Visits Requested: 1     Reason for your hospital stay   Order Comments: Admitted for rehabilitation following craniotomy with resection of brain mass.     Adult [facility] Follow-up and recommended labs and tests   Order Comments: Follow up with primary care provider, [facility], within 7-14 days for hospital follow- up  Follow up neurosurgery as scheduled  Follow up neuro oncology    Appointments on [place] and/or [facility] (with [facility] or [facility] provider or service). Call [phone] if you haven't heard regarding these appointments within 7 days of discharge.     Activity   Order Comments: Your activity upon discharge: activity as tolerated up with cane for longer distance ambulation with family support when in more busy environment.  Family support for transportation, cooking, finances, medication management .  Continue therapy as advised.     Order Specific Question Answer Comments   Is discharge order? Yes      Monitor and record   Order Comments: blood pressure daily     Wound care and dressings   Order Comments: Instructions to care for your wound at home: keep wound clean and dry and may get incision wet in shower but do not soak or scrub.     Full Code     Order Specific Question Answer Comments   Code status determined by: Discussion with patient/ legal decision maker      CPAP for stable sleep apnea with home equipment settings   Standing Status: Future Standing Exp. Date: 03/08/22     Diet   Order Comments: Follow this diet upon discharge: regular diet.     Order Specific Question Answer Comments   Is discharge order? Yes           PHYSICAL EXAMINATION    Most recent Vital Signs:   Vitals:    03/07/21 0907 03/07/21 1535 03/08/21 0636 03/08/21 0735   BP: 127/64 134/64  (!) 149/57   BP Location: Left arm Left arm  Left arm   Pulse: 77 61  85   Resp: 16 18     Temp: 96.8  F (36  C) 96.3  F (35.7  C)     TempSrc: Oral Oral     SpO2: 96% 97%     Weight:   69.9 kg (154 lb)    Height:       General: awake alert nad  HEENT:scalp incision cdi  CV: rrr  Ab: soft non distended non tender   Resp: non labored clear on room air  Extremities: warm dry without edema  MSK/Neuro: up stairs with cga from family, some mild word finding difficulties able to overall clearly express  needs.       40 minutes spent in discharge, including >50% in counseling and coordination of care, medication review and plan of care recommended on follow up.     Patient was evaluated on day of discharge by attending physician, Jl Rosenbaum MD, who agrees with plan of care.    Discharge summary was forwarded to [facility], [facility] (PCP) at the time of discharge, so as to bridge from hospital to outpatient care.     It was our pleasure to care for Olga Bailey during this hospitalization. Please do not hesitate to contact me should there be questions regarding the hospital course or discharge plan.          Kary Munoz PA-C  Physical Medicine and Rehabilitation

## 2021-03-08 NOTE — DISCHARGE INSTRUCTIONS
"Follow up Appointments    - Follow up with primary care provider  Patient and daughter to decide if they want to switch to an [facility] provider and schedule.    Address  [facility]                          [address]                          [address]                          [place] [address]  Phone   [phone]  Fax                  [phone]    - Follow up with Neurosurgery  You are scheduled for your staple removal on Tuesday, March 9th at 10:00 AM.  You are scheduled for your follow up appointment with Emerson Trimble PA-C, on Tuesday, April 6th at 1:30 PM.    Address [facility]                          [address]                          [place] [address]  Phone  [phone]    - Follow up neuro-oncology- per Neurosurgery note, they will relay path results with patient and arrange appropriate oncology consultation  The clinic will contact you to schedule your appointment. If you do not hear from them within a few days, please call [phone]    Address  [facility],                           [address] [place] [address] [address]  Phone   [phone]    -------------------------------------  Decadron: 4 mg three times daily 3/8/21.  Then transition to 2 mg three times daily starting 3/9/21.     Social Work Resources:     [employer]  -Google search \"[place] life line\"  [email or web address]/[email or web address]  Online application and/or call PH: [phone] or [phone]    Senior Linkage Line  The Senior LinkAge Line is a free statewide service of the [place] [employer] in partnership with [place] agencies on aging. The Senior LinkAge Line assists older [place] and caregivers, by connecting them to " local services, finding answers and getting the help they need.   https://[place].gov[place]senior-linkage-line/  PH: 331.347.7510  *Can discuss long-term planning, finances, medication-management, medicare/insurance, caregiver resources, life alert types of devices, how to safely stay in your home vs moving to senior housing, transportation, meals on wheels and community resources/services.   -------------------------------------------------------------------------------------------------------------------------------------------

## 2021-03-08 NOTE — PROGRESS NOTES
Reason for therapy discharge:    Discharged to home with outpatient therapy.     Progress towards therapy goal(s). See goals on Care Plan in [facility] electronic health record for goal details.  Goals met     Therapy recommendation(s):    Continued therapy is recommended.  Rationale/Recommendations:  Ongoing PT in OP setting to progress gait and mobility to community level.

## 2021-03-08 NOTE — PLAN OF CARE
RN: Pt A/O x4, VSS. Pt is independent with ADL's in her room. Appetite good. Incisions remains CDI and YOGI. Bruise on red hand, ICE pack applied. Pt requests PRN Tylenol at HS with good effects. Continue with POC.

## 2021-03-09 ENCOUNTER — OFFICE VISIT (OUTPATIENT)
Dept: NEUROSURGERY | Facility: CLINIC | Age: 76
End: 2021-03-09
Payer: MEDICARE

## 2021-03-09 VITALS
SYSTOLIC BLOOD PRESSURE: 137 MMHG | OXYGEN SATURATION: 97 % | HEART RATE: 68 BPM | DIASTOLIC BLOOD PRESSURE: 75 MMHG | TEMPERATURE: 97.5 F

## 2021-03-09 DIAGNOSIS — C71.9 GLIOMA (H): Primary | ICD-10-CM

## 2021-03-09 DIAGNOSIS — C71.9 GLIOBLASTOMA (H): ICD-10-CM

## 2021-03-09 DIAGNOSIS — Z98.890 S/P CRANIOTOMY: Primary | ICD-10-CM

## 2021-03-09 PROCEDURE — G0452 MOLECULAR PATHOLOGY INTERPR: HCPCS | Mod: 26 | Performed by: PATHOLOGY

## 2021-03-09 PROCEDURE — 81315 PML/RARALPHA COM BREAKPOINTS: CPT | Performed by: NEUROLOGICAL SURGERY

## 2021-03-09 PROCEDURE — 99207 PR NO CHARGE NURSE ONLY: CPT

## 2021-03-09 PROCEDURE — 81287 MGMT GENE PRMTR MTHYLTN ALYS: CPT | Performed by: NEUROLOGICAL SURGERY

## 2021-03-09 PROCEDURE — 81403 MOPATH PROCEDURE LEVEL 4: CPT | Performed by: NEUROLOGICAL SURGERY

## 2021-03-09 PROCEDURE — 81120 IDH1 COMMON VARIANTS: CPT | Performed by: NEUROLOGICAL SURGERY

## 2021-03-09 PROCEDURE — 999N000103 HC STATISTIC NO CHARGE FACILITY FEE

## 2021-03-09 PROCEDURE — 81321 PTEN GENE FULL SEQUENCE: CPT | Performed by: NEUROLOGICAL SURGERY

## 2021-03-09 NOTE — PATIENT INSTRUCTIONS
Instructions for Patient    Keep your incision clean and dry at all times.     No bathing, swimming, or submerging in water until incision is well healed.      No lifting greater than 10-15 pounds. No bending, twisting, or overhead reaching.    Weaning from narcotic pain medications    When it is time, start weaning by extending the time between doses.     For example, if you're taking 2 tabs every 4 hours, spread it out to 2 tabs over 4.5, 5, 6 hours.     At that point you can certainly cut down to 1 tab, then wean to an as needed basis until completely done with them.    For refills, please call our clinic. A nurse will call you back to obtain a pain assessment. Please call 3-4 business days before you run out so we can ensure there is a provider available at the location you prefer for .    Call the clinic or go to Emergency Room if you develop any new pain, drainage, swelling, or fever. Go to the Emergency Room if sudden onset of severe headache, weakness, confusion, change in level of consciousness, pain, or loss of movement.    Please call clinic with any further questions or concerns    ELEANOR Mitchell  [facility]  [address] [address]  T:  [phone]  F:  [phone]

## 2021-03-09 NOTE — PROGRESS NOTES
Patient presents for 2 week incision nurse visit check.     DOS: 2/23/21  Procedure: left parietal craniotomy for tumor resection  Surgeon: Dario Cummings MD     Patient denies any c/o headaches. Says last time she took any tylenol was 3/6. Still continues to have some WFD, right field cut, and some ataxia with ambulation.     Encouraged icing for at least 5-7 times throughout the day for 20-30 minutes at a time, avoiding heat to the incision area.     Patient is walking frequently without difficulty. Legs examined in clinic; no redness, swelling, or warmth noted. Patient denies any pain in bilateral calves. Activity restrictions reinforced at this time as outlined the After Visit Summary.     Patient's appetite is normal  Bowel/bladder problems? No  Taking stool softeners? No  Discussed reasons for constipation post-operatively and encouraged stool softeners while taking narcotic pain medication.     Patient denies signs of infection at incision site. Left crani incision inspected. Edges well-approximated. No redness, swelling, drainage, or warmth noted. Incision prepped with ChloraPrep and staple(s)  removed without difficulty. Steri-strips applied. Aftercare instructions discussed with patient.     Refills given at this appointment? No  Sent for x-rays after this appointment? No  Return to work discussed at this appointment? No    All of patient's questions addressed today. She was instructed to call with any additional questions/concerns.

## 2021-03-10 ENCOUNTER — HOSPITAL ENCOUNTER (OUTPATIENT)
Dept: SPEECH THERAPY | Facility: CLINIC | Age: 76
Setting detail: THERAPIES SERIES
End: 2021-03-10
Attending: PHYSICAL MEDICINE & REHABILITATION
Payer: MEDICARE

## 2021-03-10 DIAGNOSIS — Z98.890 S/P CRANIOTOMY: ICD-10-CM

## 2021-03-10 PROCEDURE — 96125 COGNITIVE TEST BY HC PRO: CPT | Mod: GN | Performed by: SPEECH-LANGUAGE PATHOLOGIST

## 2021-03-10 PROCEDURE — 92523 SPEECH SOUND LANG COMPREHEN: CPT | Mod: GN | Performed by: SPEECH-LANGUAGE PATHOLOGIST

## 2021-03-10 NOTE — PROGRESS NOTES
[facility] Outpatient Evaluation  Speech-Language Pathology Department  [phone]    03/10/21 1400   General Information   Type of Evaluation Speech and Language;Cognitive-Linguistic   Type Of Visit Initial   Start Of Care Date 03/10/21   Referring Physician Dr. Jl Rosenbaum   Orders Evaluate And Treat   Medical Diagnosis Cognitive-deficits, s/p craniotomy, glioblastoma   Onset Of Illness/injury Or Date Of Surgery 02/17/21   Hearing no hearing aids, within normal limits   Surgical/Medical history reviewed Yes   Pertinent History Of Current Problem Olga Bailey is a 75 year old woman with past medical history of depression, anxiety, asthma, MARCELLE, benign meningioma (2012) who presented with several weeks of word finding/ difficulties expressing self, changes in hand writing MRI revealed left lateral frontoparietal mass with edema she was advised to present to ED was admitted to [facility] 2/17/21 underwent left parietal craniotomy and tumor resection 2/23/21.  Admitted to acute rehab 3/1/21 and discharged home on 3/8/21 with orders for outpatient speech-language evaluation. Patient reports impairments with word retrieval, writing, reading comprehension, memory and concentration/reasoning.      Prior Level Of Function Comment Ms. Bailey was independent with all daily living activities prior to this event including living alone, working, managing personal finances and driving.  She is currently staying with her daughter and her family since discharge from hospital.   Current Community Support  Family/friend caregiver   Patient Role/employment History Employed  (Supervisor at [employer])   Living environment [facility]   General Observations Patient is pleasant and cooperative and able to participate in today's evaluation.   Patient/family Goals Patient would like to improve her speech, comprehension, use of the computer and to be able to do her personal finances again.    Fall  Risk Screen   Fall screen completed by PT   Fall screen comments Patient is scheduled for upcoming PT evaluation   Abuse Screen (yes response referral indicated)   Feels Unsafe at Home or Work/School no   Feels Threatened by Someone no   Does Anyone Try to Keep You From Having Contact with Others or Doing Things Outside Your Home? no   Physical Signs of Abuse Present no   Oral Motor Sensory Function   Functional Assessment Scale (Oral Motor) Minimal Impairment   Comments Based on results from ARU-patient able to eat regular textures but choosing textures carefully    Speech   Deficits in Speech Respiration None   Deficits in Phonation None   Deficits in Articulation None   Deficits in Resonance None   Deficits in [place] None   Speech Comments WNL- no impairments   Language: Auditory Comprehension (understanding of spoken language)   Tests were administered at the following levels Moderate (routine daily activities);Complex (vocation/community/social activities)   Yes/No Sentence and Simple Paragraph; [place] Diagnostic Aphasia Exam 3 short (out of 6 total) 5   Commands; [place] Diagnostic Aphasia Exam 3 (out of 15 total) 15   Functional Assessment Scale (Auditory Comprehension) No Impairment   Comments (Auditory Comprehension) Reduced memory appears to affect her comprehension   Language: Verbal Expression (use of spoken language to express information)   Tests were administered at the following levels Moderate (routine daily activities);Complex (vocation/community/social activities)   Generate Sentences; [place] Test for Differential Diagnosis Of Aphasia (out of 6 total) 6   Generative Naming Score; Cognitive Linguistic Quick Test 3   Generative Naming; Cognitive Linguistic Quick Test Result Below mean  (norms ages 70-89=4)   Conversation; [place] Diagnostic Aphasia Exam rating (out of 5 total) 5   Functional Assessment Scale (Verbal Expression) Mild Impairment   Reading Comprehension (understanding of written  language)   Tests were administered at the following levels Complex (vocation/community/social activities)   Sentences and Paragraphs; [place] Diagnostic Aphasia Exam (out of 10 total) 10   Practical Reading (out of 7 total) 7   Functional Assessment Scale (Reading Comprehension) Mild Impairment   Comments (Reading Comprehension) slower processing and recognition of words and sentences   Written Expression (use of writing to express information)   Functional Assessment Scale (Written Expression) Moderate Impairment   Comments (Written Expression) Reduced legibility and reports difficulty with spelling and connecting words.    Pragmatics (the social or functional use of a language)   Functional Assessment Scale  (Pragmatics) No Impairment   Cognitive Status Examination   Attention impaired   Visual Impairments Include   (appears reduced-OT to further assess)   Short Term Memory impaired   Reasoning impaired   Standardized cognitive-linguistic assessment completed yes;please see separate report for results;CLQT   Cognitive Status Exam Comments Pt presents with moderate impairments wwith attention, memory, executive function, and visuospatial reasoning.    Education Assessment   Barriers to Learning Cognitive   Preferred Learning Style Listening;Reading;Demonstration;Pictures/video   General Therapy Interventions   Planned Therapy Interventions Cognitive Treatment;Language   Cognitive treatment Internal memory strategy training;External memory strategy training;Progressive attention training   Language Auditory comprehension;Reading comprehension;Verbal expression;Written expression   Clinical Impression, SLP Eval   Criteria for Skilled Therapeutic Interventions Met (SLP Eval) yes;treatment indicated   SLP Diagnosis Mild-moderate cognitive-linguistic impairments.    Rehab potential affected by cancer treatments/schedule   Therapy Frequency 2 times;per week   Predicted Duration of Therapy Intervention (days/wks) 2x/week  x 12 weeks pending progress   Risks and Benefits of Treatment have been explained. Yes   Patient, Family & other staff in agreement with plan of care Yes   Clinical Impression Comments Pt presents with mild-moderate cognitive-linguistic impairments.  She presents with mild impairments with verbal expression, reading comprehension and written expression.  She presents with moderate impairments with attention, memory, executive function, and visuospatial reasoning.  These impairments will affect her ability to safely complete daily/functional cognitive-linguistic needs.  Ms. Bailey will benefit from skilled outpatient speech-language therapy services to improve her cognitive-linguistic function to return to independently living with use of trained strategies as needed.    Language/Cognition Goal 1   Goal Identifier STG 1: Language/Verbal Expression   Goal Description Patient will complete moderate level verbal expression tasks, such as defining, describing, and word generation tasks with 80% accuracy and minimal cues for improved ability to express thoughts and ideas in conversation.   Target Date 06/08/21   Language/Cognition Goal 2   Goal Identifier STG 2: Language/Reading Comprehension   Goal Description Patient will identify and implement reading comprehension strategies to complete reading comprehension at the practical to complex level with 90% accuracy for skills needed for daily living and safety needs.    Target Date 06/08/21   Language/Cognition Goal 3   Goal Identifier STG 3: Language/Written Expression   Goal Description Patient will complete practical written expression tasks such as spelling words, personal information, completing forms with 90% accuracy and minimal assistance for improved written expression needed for daily living and safety needs of communication.    Target Date 06/08/21   Language/Cognition Goal 4   Goal Identifier STG 4: Cognition/Memory   Goal Description Patient will identify and  implement 1-2 memory strategies to assist recall of new verbal and non-verbal information presented immediately and after a delay of at least 10 min with 80% accuracy and minimal assistance for memory needs for daily living communication situations and safety.   Target Date 06/08/21   Language/Cognition Goal 5   Goal Identifier STG 5: Cognition/Reasoning   Goal Description Patient will complete moderate level verbal and nonverbal problem- solving tasks including sequencing, deduction and visual planning/reasoning with 80% accuracy given moderate assistance for improved safety at home and in the community.    Target Date 06/08/21   Language/Cognition Goal 6   Goal Identifier STG 6: HEP   Goal Description Patient will complete a home exercise program geared towards the above goals with assistance from family prn.    Target Date 06/08/21   Total Session Time   Sound production with lang comprehension and expression minutes (06369) 30   Total Evaluation Time 60  (30 speech/lang eval + 30 cognitive eval)   Therapy Certification   Certification date from 03/10/21   Certification date to 06/08/21   Medical Diagnosis Cognitive-linguistic impairments   Thank you for the referral of this patient.  If you have any questions regarding the information in this report, please feel free to contact me at [phone].     Kasey Kenny MA, CCC-SLP  Speech-Language Pathologist  [address] [address]  Fax:  [phone]

## 2021-03-10 NOTE — PROGRESS NOTES
[facility]: Post-Discharge Note  SITUATION                                                      Admission:    Admission Date: 03/01/21   Reason for Admission: S/P craniotomy  Discharge:   Discharge Date: 03/09/21  Discharge Diagnosis: S/P craniotomy    BACKGROUND                                                      Olga Bailey is a 75 year old woman with past medical history of depression, anxiety, asthma, MARCELLE, benign meningioma (2012) who presented with several weeks of word finding/ difficulties expressing self, changes in hand writing MRI revealed left lateral frontoparietal mass with edema she was advised to present to ED was admitted to [facility] 2/17/21 underwent left parietal craniotomy and tumor resection 2/23/21.  Admitted to acute rehab 3/1/21.     ASSESSMENT      Discharge Assessment  Patient reports symptoms are: Improved  Does the patient have all of their medications?: Yes  Does patient know what their new medications are for?: Yes  Does patient have a follow-up appointment scheduled?: Yes  Does patient have any other questions or concerns?: No    Post-op  Did the patient have surgery or a procedure: Yes  Fever: No  Chills: No  Eating & Drinking: eating and drinking without complaints/concerns  Bowel Function: normal  Urinary Status: voiding without complaint/concerns        PLAN                                                      Outpatient Plan:  Follow up with primary care provider, Enrique Puga, within 7-14 days for hospital follow- up  Follow up neurosurgery as scheduled  Follow up neuro oncology    Future Appointments   Date Time Provider Department Center   3/10/2021  2:45 PM Kasey Kenny, SLP SLP [facility]   3/16/2021  8:00 AM Sara Khalil OTR [facility]OT [facility]   3/22/2021  4:30 PM Katelynn Alvarez PT PT [facility]   4/6/2021  1:30 PM Emerson Trimble PA-C [facility]

## 2021-03-10 NOTE — TELEPHONE ENCOUNTER
RECORDS STATUS - ALL OTHER DIAGNOSIS      RECORDS RECEIVED FROM:EPIC/[facility]   DATE RECEIVED:3/23/2021   NOTES STATUS DETAILS   OFFICE NOTE from referring provider     OFFICE NOTE from medical oncologist N/A    DISCHARGE SUMMARY from hospital Complete Epic 3/1/2021 S/P Craniotomy (Primary Dx), Brain Mass     2/17/2021 Brain Mass    DISCHARGE REPORT from the ER     OPERATIVE REPORT Complete See Brain Biopsy Report Below in Epic (Internal)    MEDICATION LIST Complete [facility]   CLINICAL TRIAL TREATMENTS TO DATE     LABS     PATHOLOGY REPORTS Complete- internal Brain Biopsy in [facility] 2/23/2021 Brain Biopsy   A. Brain, left parietal mass, biopsy:   -Glioblastoma, IDH wild-type, WHO grade 4. See Comment.     B. Brain, left parietal mass, resection:   -Glioblastoma, IDH wild-type, WHO grade 4.    ANYTHING RELATED TO DIAGNOSIS Complete Labs last updated on 3/9/2021   GENONOMIC TESTING     TYPE:     IMAGING (NEED IMAGES & REPORT)     CT SCANS Complete    Complete- [facility] CT Head 2/24/2021    CT Chest Abdomen Pelvis     [facility]- CT Head Brain 2/16/2021   MRI Complete    Complete- [facility] 2/25/2021 MRI Brain     2/19/2021 MRI Brain     [facility]- MRI Head Brain 2/17/2021   MAMMO     ULTRASOUND     PET

## 2021-03-10 NOTE — TELEPHONE ENCOUNTER
ONCOLOGY INTAKE: Records Information      APPT INFORMATION:  Referring provider:  Dr. Dario Harrington MD  Referring provider s clinic:  [facility]   Reason for visit/diagnosis:  S/P craniotomy [Z98.890]  Glioblastoma (H)  Has patient been notified of appointment date and time?: Yes    RECORDS INFORMATION:  Were the records received with the referral (via [employer])? No,Internal Referral      Has patient been seen for any external appt for this diagnosis? No    If yes, where? NA    ADDITIONAL INFORMATION:  None

## 2021-03-10 NOTE — PROGRESS NOTES
Speech Language Pathology     Cognitive Linguistic Quick Test (CLQT)    SUMMARY OF TEST:    The CLQT assesses visual attention and perception, working memory and language output skills, as well as auditory memory and comprehension.  Non-linguistic tasks can help assess planning, and self-monitoring, visual discrimination and analysis, as well as creativity and mental flexibility.   Together, these subtests assess the cognitive domains of attention, memory, executive function, language, and visuospatial skills using a severity rating of either WNL (within normal limits), Mild, Moderate or Severe.    RESULTS OF TESTING:   Attention    Score: 30    Severity Rating: Severe   Memory    Score: 122    Severity Rating: Mild    Executive Functions    Score: 7    Severity Rating: Severe   Language    Score: 25    Severity Rating: Mild   Visuospatial Skills    Score: 24    Severity Rating: Moderate   Composite Severity Rating    Score: 2    Severity Rating: Moderate  INTERPRETATION OF TEST RESULTS:Pt presents with moderate impairments with attention, memory, executive function, and visuospatial reasoning. She presents with mild impairments with language based on standard scores for above subtests.   TIME ADMINISTERING TEST: 30 min  TIME FOR INTERPRETATION AND PREPARATION OF REPORT: 45 min  TOTAL TIME: 75 min  Reference:  Usha Brown, Jesus, CCC-SLP, (2001) [employer]

## 2021-03-10 NOTE — PROGRESS NOTES
[unfilled]                                                                          [facility]          OUTPATIENT SPEECH LANGUAGE PATHOLOGY LANGUAGE-COGNITION  EVALUATION  PLAN OF TREATMENT FOR OUTPATIENT REHABILITATION  (COMPLETE FOR INITIAL CLAIMS ONLY)  Patient's Last Name, First Name, M.I.  YOB: 1945  Olga Bailey                        Provider s Name: [facility] Medical Record No.  [id number]     Onset Date:  02/17/21   Start of Care Date: 03/10/21   Type:     ___PT  __OT   _X_SLP    Medical Diagnosis:  Cognitive-linguistic impairments   Speech Language Pathology Diagnosis:       Visits from SOC: 1                                        ________________________________________________________________________________  Plan of Treatment/Functional Goals:   Planned Therapy Interventions: Cognitive Treatment, Language                 Language / Cognition Goals  1. Goal Identifier: STG 1: Language/Verbal Expression       Goal Description: Patient will complete moderate level verbal expression tasks, such as defining, describing, and word generation tasks with 80% accuracy and minimal cues for improved ability to express thoughts and ideas in conversation.       Target Date: 06/08/21   2. Goal Identifier: STG 2: Language/Reading Comprehension       Goal Description: Patient will identify and implement reading comprehension strategies to complete reading comprehension at the practical to complex level with 90% accuracy for skills needed for daily living and safety needs.        Target Date: 06/08/21   3. Goal Identifier: STG 3: Language/Written Expression       Goal Description: Patient will complete practical written expression tasks such as spelling words, personal information, completing forms with 90% accuracy and minimal assistance for improved written expression needed for daily living and safety needs of communication.         Target Date: 06/08/21   4. Goal Identifier: STG 4: Cognition/Memory       Goal Description: Patient will identify and implement 1-2 memory strategies to assist recall of new verbal and non-verbal information presented immediately and after a delay of at least 10 min with 80% accuracy and minimal assistance for memory needs for daily living communication situations and safety.       Target Date: 06/08/21   5. Goal Identifier: STG 5: Cognition/Reasoning       Goal Description: Patient will complete moderate level verbal and nonverbal problem- solving tasks including sequencing, deduction and visual planning/reasoning with 80% accuracy given moderate assistance for improved safety at home and in the community.        Target Date: 06/08/21  6.  Goal Identifier: STG 6: HEP       Goal Description: Patient will complete a home exercise program geared towards the above goals with assistance from family prn.        Target Date: 06/08/21                    Predicted Duration of Therapy Intervention (days/wks): 2x/week x 12 weeks pending progress    Kasey Kenny, SLP       I CERTIFY THE NEED FOR THESE SERVICES FURNISHED UNDER        THIS PLAN OF TREATMENT AND WHILE UNDER MY CARE     (Physician co-signature of this document indicates review and certification of the therapy plan).                  Certification Date From:  03/10/21  Certification Date To:   06/08/21          Referring Physician:  Dr. Jl Rosenbaum    Initial Assessment        See Epic Evaluation Start Of Care Date: 03/10/21

## 2021-03-11 ENCOUNTER — HOSPITAL ENCOUNTER (OUTPATIENT)
Dept: PHYSICAL THERAPY | Facility: CLINIC | Age: 76
Setting detail: THERAPIES SERIES
End: 2021-03-11
Attending: PHYSICAL MEDICINE & REHABILITATION
Payer: MEDICARE

## 2021-03-11 DIAGNOSIS — Z98.890 S/P CRANIOTOMY: ICD-10-CM

## 2021-03-11 PROCEDURE — 97162 PT EVAL MOD COMPLEX 30 MIN: CPT | Mod: GP | Performed by: PHYSICAL THERAPIST

## 2021-03-11 PROCEDURE — 97116 GAIT TRAINING THERAPY: CPT | Mod: GP | Performed by: PHYSICAL THERAPIST

## 2021-03-11 NOTE — PROGRESS NOTES
[facility]        OUTPATIENT PHYSICAL THERAPY FUNCTIONAL EVALUATION  PLAN OF TREATMENT FOR OUTPATIENT REHABILITATION  (COMPLETE FOR INITIAL CLAIMS ONLY)  Patient's Last Name, First Name, M.I.  YOB: 1945  Olga Bailey  ANA MARÍA     Provider's Name   [facility]   Medical Record No.  [id number]     Start of Care Date:  03/11/21   Onset Date:  (2/23/21 Date of surgery)   Type:     _X__PT   ____OT  ____SLP Medical Diagnosis:  L parietal craniotomy and glioblastoma resection     PT Diagnosis:  decreased independence with functional mobility and activities of daily living.   Visits from SOC:  1                              __________________________________________________________________________________  Plan of Treatment/Functional Goals:  gait training, neuromuscular re-education, strengthening, stretching, transfer training, manual therapy, visual perception           GOALS  DGI  Olga will score 20/24 or greater on the DGI to indicate improved gait skills and reduced falls risk in order to return home independently.   06/09/21    Stairs  Olga will be able to negotiate up and down a flight of stairs reciprocally with one rail without significant fatigue in order to access all levels of her home safely.    06/09/21    Community mobility  Olga will be able to ambulate 1 mile outdoors without an assistive device in order to progress toward her prior walking activities for management of her health and wellness.    06/09/21    Gait speed  Olga will be able to ambulate 25 feet in 6 sec or less without significant deviation in order to demonstrate improved gait speed and efficiency for community mobility.    06/09/21                                                Therapy Frequency:  2 times/Week   Predicted Duration of Therapy Intervention:  90 days    Danni MURPHY  Josemanuel, PT                                    I CERTIFY THE NEED FOR THESE SERVICES FURNISHED UNDER        THIS PLAN OF TREATMENT AND WHILE UNDER MY CARE     (Physician co-signature of this document indicates review and certification of the therapy plan).                Certification Date From:  03/11/21   Certification Date To:  06/09/21    Referring Provider:  Sinan Rosenbaum MD    Initial Assessment  See Epic Evaluation- Start of Care Date: 03/11/21

## 2021-03-11 NOTE — PROGRESS NOTES
03/11/21 1100   Quick Adds   Quick Adds Certification   Type of Visit Initial OP PT Evaluation   General Information   Start of Care Date 03/11/21   Referring Physician Sinan Rosenbaum MD   Orders Evaluate and Treat as Indicated   Order Date 03/05/21   Medical Diagnosis left parietal craniotomy and tumor resection    Onset of illness/injury or Date of Surgery   (2/23/21 Date of surgery)   Precautions/Limitations fall precautions   Surgical/Medical history reviewed Yes   Pertinent history of current problem (include personal factors and/or comorbidities that impact the POC) Olga Bailey is a 75 year old woman with past medical history of R TKA in 8/2020, depression, anxiety, asthma, MARCELLE, benign meningioma (2012) who presented with several weeks of word finding/ difficulties expressing self, changes in hand writing MRI revealed left lateral frontoparietal mass with edema she was advised to present to ED was admitted to [facility] 2/17/21 underwent left parietal craniotomy and tumor resection 2/23/21.  Admitted to acute rehab 3/1/21 and discharged home with her daughter on 3/8/21 with orders for outpatient PT to progress functional mobility and independence.  Currently pt reports significant fatigue with daily tasks, decreased balance and stability , especially when fatigued.     Pertinent Visual History  Right visual field cut.  Olga feels this has improved.   Prior level of function comment Fully independent, active, working (prior to COVID),  living independently in own home, driving, managing own finances, meds and community outings.  loves to walk, previously walked 4 miles 2-3 x per week.     Current Community Support Family/friend caregiver  (Daughter La Nena)   Patient role/Employment history Unemployed  (prior to Covid -[employer])   Living environment House/townhome   Home/Community Accessibility Comments 2 level home but everything she needs is on one level.  Does not have to  go to lower level.     Current Assistive Devices Standard Cane   ADL Devices Shower/Tub Chair;Shower/Tub Grab Bar   Assistive Devices Comments has not been using cane, currently no walking much outside of home except for appointments.    Patient/Family Goals Statement Return to living independently in home, being able to walk normally without caution, driving, possible return to work depending on what opportunities are available   Fall Risk Screen   Fall screen completed by PT   Have you fallen 2 or more times in the past year? Yes  (but not since TKA in August 2020. )   Have you fallen and had an injury in the past year? No   Timed Up and Go score (seconds) NT . see DGI/Lamar   Is patient a fall risk? Yes   Fall screen comments according to DGI/Lamar.    Abuse Screen (yes response referral indicated)   Feels Unsafe at Home or Work/School no   Feels Threatened by Someone no   Does Anyone Try to Keep You From Having Contact with Others or Doing Things Outside Your Home? no   Physical Signs of Abuse Present no   Pain   Patient currently in pain No   Cognitive Status Examination   Orientation orientation to person, place and time   Level of Consciousness alert   Follows Commands and Answers Questions 100% of the time;able to follow multistep instructions   Personal Safety and Judgment intact  (admits to being impulsive at times.  trying to slow down)   Memory   (see SLP/OT evals)   Cognitive Comment see OT/SLP evals   Posture   Posture Forward head position   Posture Comments mild flexed posture.  Inward deviation right LE with toe in position.    Range of Motion (ROM)   ROM Quick Adds no deficits were identified   Strength   Strength Comments right ankle DF 4/5, Left 5/5,  bilat hip flexion  4/5.  bilateral Knee flex/ext 5/5    Transfer Skills   Transfer Comments able to rise to stand and lower to sit without UE support    Gait   Gait Comments Pt ambulates with no AD, step through pattern, downward gaze, decreased  thoracic extension,  internal rotation of right LE,  mild deviaiton from path intermittently.  Decreasd coordination/motor control with turns.  When fatigued demos greater imbalance with turns, decreased coordination of LEs iraj on right to step with turn properly.  LOB x 1 when  turning to left to walk back to room,  needed assist to prevent fall.    STAIRS:  up/down 4 six inch steps with one railing in reciprocal pattern.  Moves slowly and watches feet.     Gait Special Tests   Gait Special Tests 25 FOOT TIMED WALK;DYNAMIC GAIT INDEX   Gait Special Tests 25 Foot Timed Walk   Seconds 9.97 sec   Steps 16 Steps   Comments no AD   Gait Special Tests Dynamic Gait Index   Score out of 24 16/24   Comments no aD.  increased falls risk.    Balance   Balance Comments decreased stability with narrow SABA, wt shift    Balance Special Tests   Balance Special Tests Lamar balance   Balance Special Tests Lamar Balance   Score out of 56 43/56   Comments decreased from 45/56 in hospital   Coordination   Coordination Comments imparied right LE especially with turning/direction changes.     Muscle Tone   Muscle Tone no deficits were identified   Modality Interventions   Planned Modality Interventions Comments per PT   Planned Therapy Interventions   Planned Therapy Interventions gait training;neuromuscular re-education;strengthening;stretching;transfer training;manual therapy;visual perception   Clinical Impression   Criteria for Skilled Therapeutic Interventions Met yes, treatment indicated   PT Diagnosis decreased independence with functional mobility and activities of daily living.     Influenced by the following impairments deconditioning, impaired motor coordination, decreased strength, visual field cut.     Functional limitations due to impairments risk for falls, impaired gait, impaired balance affecting ability to live independently   Clinical Presentation Evolving/Changing   Clinical Presentation Rationale slight decrease in  balance score from hospital, undetermined if further oncological treatment is needed - still needs to follow up with oncology.  Supportive family.     Clinical Decision Making (Complexity) Moderate complexity   Therapy Frequency 2 times/Week   Predicted Duration of Therapy Intervention (days/wks) 90 days   Risk & Benefits of therapy have been explained Yes   Patient, Family & other staff in agreement with plan of care Yes   GOALS   PT Eval Goals 1;2;3;4   Goal 1   Goal Identifier DGI   Goal Description Olga will score 20/24 or greater on the DGI to indicate improved gait skills and reduced falls risk in order to return home independently.    Target Date 06/09/21   Goal 2   Goal Identifier Stairs   Goal Description Olga will be able to negotiate up and down a flight of stairs reciprocally with one rail without significant fatigue in order to access all levels of her home safely.     Target Date 06/09/21   Goal 3   Goal Identifier Community mobility   Goal Description Olga will be able to ambulate 1 mile outdoors without an assistive device in order to progress toward her prior walking activities for management of her health and wellness.     Target Date 06/09/21   Goal 4   Goal Identifier Gait speed   Goal Description Olga will be able to ambulate 25 feet in 6 sec or less without significant deviation in order to demonstrate improved gait speed and efficiency for community mobility.     Target Date 06/09/21   Total Evaluation Time   PT Eval, Moderate Complexity Minutes (37983) 50   Therapy Certification   Certification date from 03/11/21   Certification date to 06/09/21   Medical Diagnosis L parietal craniotomy and glioblastoma resection

## 2021-03-16 ENCOUNTER — HOSPITAL ENCOUNTER (OUTPATIENT)
Dept: OCCUPATIONAL THERAPY | Facility: CLINIC | Age: 76
Setting detail: THERAPIES SERIES
End: 2021-03-16
Payer: MEDICARE

## 2021-03-16 DIAGNOSIS — Z98.890 S/P CRANIOTOMY: ICD-10-CM

## 2021-03-16 PROCEDURE — 97535 SELF CARE MNGMENT TRAINING: CPT | Mod: GO | Performed by: OCCUPATIONAL THERAPIST

## 2021-03-16 PROCEDURE — 97166 OT EVAL MOD COMPLEX 45 MIN: CPT | Mod: GO | Performed by: OCCUPATIONAL THERAPIST

## 2021-03-16 PROCEDURE — 97165 OT EVAL LOW COMPLEX 30 MIN: CPT | Mod: GO | Performed by: OCCUPATIONAL THERAPIST

## 2021-03-16 ASSESSMENT — ACTIVITIES OF DAILY LIVING (ADL)
IADL_QUICK_ADDS: MEAL PLANNING/PREPARATION;HOME/FINANCIAL/MANAGEMENT;COMMUNICATION/COMPUTER USE;COMMUNITY MOBILITY;CARE OF OTHERS

## 2021-03-17 LAB — COPATH REPORT: NORMAL

## 2021-03-18 ENCOUNTER — TELEPHONE (OUTPATIENT)
Dept: NEUROSURGERY | Facility: CLINIC | Age: 76
End: 2021-03-18

## 2021-03-18 NOTE — TELEPHONE ENCOUNTER
"Patients daughter La Nena called for further instructions about her mothers medications. Decadron instructions say \"Further titration per outpatient providers.\" Daughter is wondering next steps.   Daughter and pt also followed up with PCP and they discontinued her lisinopril. Daughter wondering if that is what our team would like her mom to do. Will route to care team for further suggestions.   "

## 2021-03-18 NOTE — TELEPHONE ENCOUNTER
Reason for call: Pt's daughter called needing help with a question she has about medication for her mother. Please call her back at [phone]. Thank you

## 2021-03-19 ENCOUNTER — HOSPITAL ENCOUNTER (OUTPATIENT)
Dept: PHYSICAL THERAPY | Facility: CLINIC | Age: 76
Setting detail: THERAPIES SERIES
End: 2021-03-19
Payer: MEDICARE

## 2021-03-19 PROCEDURE — 97110 THERAPEUTIC EXERCISES: CPT | Mod: GP,59 | Performed by: PHYSICAL THERAPIST

## 2021-03-19 PROCEDURE — 97112 NEUROMUSCULAR REEDUCATION: CPT | Mod: GP | Performed by: PHYSICAL THERAPIST

## 2021-03-19 NOTE — TELEPHONE ENCOUNTER
Patient is not currently out of decadron medication and is feeling well. Daughter just had questions on who was resuming care with those medications. Daughter was informed that oncology will address her questions about Decadron. Per the physicians notes while inpatient and at [facility]- PCP will continue to follow up with blood pressure medications, reiterated to the daughter.

## 2021-03-22 ENCOUNTER — HOSPITAL ENCOUNTER (OUTPATIENT)
Dept: OCCUPATIONAL THERAPY | Facility: CLINIC | Age: 76
Setting detail: THERAPIES SERIES
End: 2021-03-22
Attending: PHYSICAL MEDICINE & REHABILITATION
Payer: MEDICARE

## 2021-03-22 PROCEDURE — 97535 SELF CARE MNGMENT TRAINING: CPT | Mod: GO | Performed by: OCCUPATIONAL THERAPIST

## 2021-03-23 ENCOUNTER — PATIENT OUTREACH (OUTPATIENT)
Dept: ONCOLOGY | Facility: CLINIC | Age: 76
End: 2021-03-23

## 2021-03-23 ENCOUNTER — TELEPHONE (OUTPATIENT)
Dept: ONCOLOGY | Facility: CLINIC | Age: 76
End: 2021-03-23

## 2021-03-23 ENCOUNTER — ONCOLOGY VISIT (OUTPATIENT)
Dept: ONCOLOGY | Facility: CLINIC | Age: 76
End: 2021-03-23
Attending: NEUROLOGICAL SURGERY
Payer: MEDICARE

## 2021-03-23 ENCOUNTER — HOSPITAL ENCOUNTER (OUTPATIENT)
Facility: CLINIC | Age: 76
Setting detail: SPECIMEN
Discharge: HOME | DRG: 166 | End: 2021-03-23
Attending: NEUROLOGICAL SURGERY | Admitting: PSYCHIATRY & NEUROLOGY
Payer: MEDICARE

## 2021-03-23 ENCOUNTER — PRE VISIT (OUTPATIENT)
Dept: ONCOLOGY | Facility: CLINIC | Age: 76
End: 2021-03-23

## 2021-03-23 VITALS
HEART RATE: 73 BPM | HEIGHT: 63 IN | TEMPERATURE: 97.7 F | SYSTOLIC BLOOD PRESSURE: 136 MMHG | BODY MASS INDEX: 27.36 KG/M2 | DIASTOLIC BLOOD PRESSURE: 78 MMHG | WEIGHT: 154.4 LBS | RESPIRATION RATE: 16 BRPM | OXYGEN SATURATION: 97 %

## 2021-03-23 DIAGNOSIS — C71.9 GLIOBLASTOMA (H): Primary | ICD-10-CM

## 2021-03-23 DIAGNOSIS — Z98.890 S/P CRANIOTOMY: ICD-10-CM

## 2021-03-23 DIAGNOSIS — Z91.89 HIGH RISK FOR CHEMOTHERAPY-INDUCED INFECTIOUS COMPLICATION: ICD-10-CM

## 2021-03-23 DIAGNOSIS — Z11.59 SCREENING FOR VIRAL DISEASE: ICD-10-CM

## 2021-03-23 DIAGNOSIS — D61.810 ANTINEOPLASTIC CHEMOTHERAPY INDUCED PANCYTOPENIA (H): ICD-10-CM

## 2021-03-23 DIAGNOSIS — T45.1X5A CHEMOTHERAPY-INDUCED NAUSEA AND VOMITING: ICD-10-CM

## 2021-03-23 DIAGNOSIS — T45.1X5A ANTINEOPLASTIC CHEMOTHERAPY INDUCED PANCYTOPENIA (H): ICD-10-CM

## 2021-03-23 DIAGNOSIS — R11.2 CHEMOTHERAPY-INDUCED NAUSEA AND VOMITING: ICD-10-CM

## 2021-03-23 LAB
ALBUMIN SERPL-MCNC: 3.4 G/DL (ref 3.4–5)
ALP SERPL-CCNC: 65 U/L (ref 40–150)
ALT SERPL W P-5'-P-CCNC: 38 U/L (ref 0–50)
ANION GAP SERPL CALCULATED.3IONS-SCNC: 4 MMOL/L (ref 3–14)
AST SERPL W P-5'-P-CCNC: 15 U/L (ref 0–45)
BASOPHILS # BLD AUTO: 0 10E9/L (ref 0–0.2)
BASOPHILS NFR BLD AUTO: 0.2 %
BILIRUB SERPL-MCNC: 0.4 MG/DL (ref 0.2–1.3)
BUN SERPL-MCNC: 28 MG/DL (ref 7–30)
CALCIUM SERPL-MCNC: 8.8 MG/DL (ref 8.5–10.1)
CHLORIDE SERPL-SCNC: 104 MMOL/L (ref 94–109)
CO2 SERPL-SCNC: 28 MMOL/L (ref 20–32)
COPATH REPORT: NORMAL
CREAT SERPL-MCNC: 0.52 MG/DL (ref 0.52–1.04)
DIFFERENTIAL METHOD BLD: ABNORMAL
EOSINOPHIL # BLD AUTO: 0 10E9/L (ref 0–0.7)
EOSINOPHIL NFR BLD AUTO: 0 %
ERYTHROCYTE [DISTWIDTH] IN BLOOD BY AUTOMATED COUNT: 14.4 % (ref 10–15)
GFR SERPL CREATININE-BSD FRML MDRD: >90 ML/MIN/{1.73_M2}
GLUCOSE SERPL-MCNC: 72 MG/DL (ref 70–99)
HBV CORE AB SERPL QL IA: NONREACTIVE
HBV SURFACE AG SERPL QL IA: NONREACTIVE
HCT VFR BLD AUTO: 35.5 % (ref 35–47)
HGB BLD-MCNC: 11.6 G/DL (ref 11.7–15.7)
IMM GRANULOCYTES # BLD: 0.2 10E9/L (ref 0–0.4)
IMM GRANULOCYTES NFR BLD: 2 %
LYMPHOCYTES # BLD AUTO: 0.2 10E9/L (ref 0.8–5.3)
LYMPHOCYTES NFR BLD AUTO: 2 %
MCH RBC QN AUTO: 29 PG (ref 26.5–33)
MCHC RBC AUTO-ENTMCNC: 32.7 G/DL (ref 31.5–36.5)
MCV RBC AUTO: 89 FL (ref 78–100)
MONOCYTES # BLD AUTO: 0.7 10E9/L (ref 0–1.3)
MONOCYTES NFR BLD AUTO: 6.5 %
NEUTROPHILS # BLD AUTO: 8.9 10E9/L (ref 1.6–8.3)
NEUTROPHILS NFR BLD AUTO: 89.3 %
NRBC # BLD AUTO: 0 10*3/UL
NRBC BLD AUTO-RTO: 0 /100
PLATELET # BLD AUTO: 176 10E9/L (ref 150–450)
POTASSIUM SERPL-SCNC: 4.1 MMOL/L (ref 3.4–5.3)
PROT SERPL-MCNC: 6.2 G/DL (ref 6.8–8.8)
RBC # BLD AUTO: 4 10E12/L (ref 3.8–5.2)
SODIUM SERPL-SCNC: 136 MMOL/L (ref 133–144)
WBC # BLD AUTO: 10 10E9/L (ref 4–11)

## 2021-03-23 PROCEDURE — 85025 COMPLETE CBC W/AUTO DIFF WBC: CPT | Performed by: PSYCHIATRY & NEUROLOGY

## 2021-03-23 PROCEDURE — 99205 OFFICE O/P NEW HI 60 MIN: CPT | Performed by: PSYCHIATRY & NEUROLOGY

## 2021-03-23 PROCEDURE — G0463 HOSPITAL OUTPT CLINIC VISIT: HCPCS

## 2021-03-23 PROCEDURE — 86704 HEP B CORE ANTIBODY TOTAL: CPT | Performed by: PSYCHIATRY & NEUROLOGY

## 2021-03-23 PROCEDURE — 80053 COMPREHEN METABOLIC PANEL: CPT | Performed by: PSYCHIATRY & NEUROLOGY

## 2021-03-23 PROCEDURE — 87340 HEPATITIS B SURFACE AG IA: CPT | Performed by: PSYCHIATRY & NEUROLOGY

## 2021-03-23 RX ORDER — ONDANSETRON 4 MG/1
4 TABLET, FILM COATED ORAL AT BEDTIME
Qty: 30 TABLET | Refills: 3 | Status: ON HOLD | OUTPATIENT
Start: 2021-03-23 | End: 2021-04-19

## 2021-03-23 RX ORDER — SULFAMETHOXAZOLE/TRIMETHOPRIM 800-160 MG
1 TABLET ORAL
Qty: 18 TABLET | Refills: 0 | Status: ON HOLD | OUTPATIENT
Start: 2021-03-24 | End: 2021-04-09

## 2021-03-23 ASSESSMENT — PAIN SCALES - GENERAL: PAINLEVEL: NO PAIN (0)

## 2021-03-23 ASSESSMENT — MIFFLIN-ST. JEOR: SCORE: 1164.48

## 2021-03-23 NOTE — PROGRESS NOTES
Met with Olga Bailey and her daughter La Nena after clinic visit/consultation appt with Dr. Baker.  We reviewed  Dr. Baker's plan of care :     Introduced self and role of RN Care Coordinator at [facility].     Provided my contact information, Cancer Clinic phone number (which has options to talk with a Nurse available 24/7 - triage and RNCC via this option during business hours).   Reviewed appropriate use of MyChart, not to be used for symptom reporting.   Reviewed current adjustments in clinic flow due to Covid-19 pandemic.    Provided overview of supportive services at [facility] including SW, oncology dietitian, oncology behavioural health providers (psychology, psychiatry, counseling). Discussed Brain Tumor Support Group and will add La Nena's email to the group [email or web address]    Contacted [facility] ENGLEO to cancel visit Thursday. Contacted [facility] and helped coordinate consult appointment 3/24/21 at 2:00PM.    Olga Bailey voiced understanding and appreciation of above information and denies any further questions, and he/she understands that I will follow and provide coordination as needed.      see Pt Instructions printed and reviewed with pt / sent to pt by Ry[facility]le / mail    Louise Jerry, BSN, RN, PHN, OCN  Oncology Care Coordinator  [facility]

## 2021-03-23 NOTE — PROGRESS NOTES
"Oncology Rooming Note    March 23, 2021 11:10 AM   Olga Bailey is a 75 year old female who presents for:    Chief Complaint   Patient presents with     Oncology Clinic Visit     Initial Vitals: /78 (BP Location: Left arm, Patient Position: Sitting, Cuff Size: Adult Regular)   Pulse 73   Temp 97.7  F (36.5  C) (Oral)   Resp 16   Ht 1.6 m (5' 3\")   Wt 70 kg (154 lb 6.4 oz)   SpO2 97%   BMI 27.35 kg/m   Estimated body mass index is 27.35 kg/m  as calculated from the following:    Height as of this encounter: 1.6 m (5' 3\").    Weight as of this encounter: 70 kg (154 lb 6.4 oz). Body surface area is 1.76 meters squared.  No Pain (0) Comment: Data Unavailable   No LMP recorded. Patient has had a hysterectomy.  Allergies reviewed: Yes  Medications reviewed: Yes    Medications: Medication refills not needed today.  Pharmacy name entered into [facility]:    [id number] [place], [place] - [address] [id number] - [place], [place] - [address] AT [facility] OF  & [id number] - [place], [place] - [address] AT [facility] [address]    Clinical concerns: no     Paula Turner CMA              "
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
## 2021-03-23 NOTE — PATIENT INSTRUCTIONS
Standard of care for glioblastoma is radiation therapy + chemotherapy with temozolomide (Temodar)  How to take your chemotherapy:  -Your temozolomide dose is 140 mg  -Start date: Evening before the start of radiation; TBD   Referral to [facility] in [place].   -Continue every day, regardless of radiation schedule, until the night before your last day of radiation  -Take on an empty stomach and after taking anti-nausea medication;    Stop eating 2 hours before bedtime   After 1.5 hours of fasting, take Zofran   Wait at least another 30 minutes   Take temozolomide and go to bed  -Only the patient should handle the chemotherapy capsules    -Weekly blood count testing: At RT appts.    Baseline labs drawn today.     Anticipated side effects:  -Nausea   -Decreased blood counts   -Constipation   -Elevation in liver enzymes   -Fatigue/ Malaise* (*worse with combine radiation therapy)  -Rash (rare)     Supportive medications:  -Nausea: Zofran 4mg; 1 to 2 tabs 30 minutes prior to chemotherapy and then every 8 hours as needed  -Constipation: Docusate 100 mg; 1 cap orally 3 times a day (stool softener)**                         Senna 8.6 m tabs orally at bedtime (laxative)**    -Steroids: Dexamethasone taper;    4mg daily (today - 3/28)    2mg daily (3/29 - )    2mg on , , , then stop.    Recommend taking with breakfast   Dose may need to be increased during radiation    -Antibiotic precaution: Sulfamethoxazole-trimethoprim (Bactrim) 800 mg-160 mg; 1 tab orally Monday, Wednesday, and Friday for pneumocystis prophylaxis, continue until radiation therapy is completed    Other supportive measures:  -Oral hygiene/ keep mouth and lips moist by rinsing with water every 2 hours while awake. If needed, add salt or baking soda (1/2 to 1 teaspoon in 8oz of water), use commercially available saliva substitute (ex. Biotene), apply lipmoisturizer/chap stick, and/ or suck on hard candies.    Stop Keppra.    Always call with any  questions or concerns or to report new complaints:  -Cancer patients are at increased risk of developing clots. Please call if you notice leg swelling.  -Mood changes (depression/ agitation/ anxiety) are to be expected and may be worsened by some medications, but please note that untreated/undertreated depression can decrease one s energy, appetite, and mood.   -It is important to maintain a healthy, well-balanced diet. Please disclose all alternative medications/ supplements, as these substances may interact with or even reverse the effect of your cancer-directed treatments.  -Partake in moderate physical activity, as tolerated, to help boost energy and combat constipation.  -Call 9-1-1 or go to the nearest emergency room if you develop a severe headache, abrupt change in mental status, seizures, or significantly and new neurological symptoms.    Overall plan:  Following completion of radiation therapy + chemotherapy, there will be a 1 month period without treatment. After this time, there will be a new post-radiation MR brain scan and we will discuss starting temozolomide at a higher dose taken for 5 consecutive days/ month.    Return to clinic: in 4 weeks. Will discuss the potential use of Optune at this appt.     Pleasure meeting you both today.     Stephanie Baker MD  Neuro-oncology  3/23/2021
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
## 2021-03-23 NOTE — TELEPHONE ENCOUNTER
Daughter La Nena called and was wanting to confirm that Olga's Keppra is supposed to be discontinued, per visit today.  She said she didn't see it listed on her AVS, thus why she is calling to confirm.    Informed La Nena that patient instructions note from Dr. Baker states to stop Keppra, in addition to dexamethasone taper instructions.  After stating that, she then noticed the patient instructions written on the AVS.  She verbalized understanding.    Erich Hodges, JIANN, RN, OCN  Oncology Care Coordinator  [facility]

## 2021-03-24 ENCOUNTER — HOSPITAL ENCOUNTER (OUTPATIENT)
Dept: OCCUPATIONAL THERAPY | Facility: CLINIC | Age: 76
Setting detail: THERAPIES SERIES
End: 2021-03-24
Attending: PHYSICAL MEDICINE & REHABILITATION
Payer: MEDICARE

## 2021-03-24 ENCOUNTER — TRANSFERRED RECORDS (OUTPATIENT)
Dept: HEALTH INFORMATION MANAGEMENT | Facility: CLINIC | Age: 76
End: 2021-03-24

## 2021-03-24 PROCEDURE — 97535 SELF CARE MNGMENT TRAINING: CPT | Mod: GO | Performed by: OCCUPATIONAL THERAPIST

## 2021-03-25 ENCOUNTER — HOSPITAL ENCOUNTER (OUTPATIENT)
Dept: SPEECH THERAPY | Facility: CLINIC | Age: 76
Setting detail: THERAPIES SERIES
End: 2021-03-25
Attending: PHYSICAL MEDICINE & REHABILITATION
Payer: MEDICARE

## 2021-03-25 PROCEDURE — 92507 TX SP LANG VOICE COMM INDIV: CPT | Mod: GN | Performed by: SPEECH-LANGUAGE PATHOLOGIST

## 2021-03-26 ENCOUNTER — HOSPITAL ENCOUNTER (OUTPATIENT)
Dept: SPEECH THERAPY | Facility: CLINIC | Age: 76
Setting detail: THERAPIES SERIES
End: 2021-03-26
Attending: PHYSICAL MEDICINE & REHABILITATION
Payer: MEDICARE

## 2021-03-26 ENCOUNTER — TELEPHONE (OUTPATIENT)
Dept: ONCOLOGY | Facility: CLINIC | Age: 76
End: 2021-03-26

## 2021-03-26 ENCOUNTER — APPOINTMENT (OUTPATIENT)
Dept: CT IMAGING | Facility: CLINIC | Age: 76
DRG: 166 | End: 2021-03-26
Attending: EMERGENCY MEDICINE
Payer: MEDICARE

## 2021-03-26 ENCOUNTER — HOSPITAL ENCOUNTER (INPATIENT)
Facility: CLINIC | Age: 76
LOS: 31 days | Discharge: OTHER INSTITUTION | DRG: 166 | End: 2021-04-26
Attending: EMERGENCY MEDICINE | Admitting: HOSPITALIST
Payer: MEDICARE

## 2021-03-26 ENCOUNTER — HOSPITAL ENCOUNTER (OUTPATIENT)
Dept: PHYSICAL THERAPY | Facility: CLINIC | Age: 76
Setting detail: THERAPIES SERIES
End: 2021-03-26
Attending: PHYSICAL MEDICINE & REHABILITATION
Payer: MEDICARE

## 2021-03-26 DIAGNOSIS — B59 PNEUMONIA OF BOTH LUNGS DUE TO PNEUMOCYSTIS JIROVECII, UNSPECIFIED PART OF LUNG (H): Primary | ICD-10-CM

## 2021-03-26 DIAGNOSIS — T38.0X5A STEROID-INDUCED HYPERGLYCEMIA: ICD-10-CM

## 2021-03-26 DIAGNOSIS — R73.9 STEROID-INDUCED HYPERGLYCEMIA: ICD-10-CM

## 2021-03-26 DIAGNOSIS — J12.82 PNEUMONIA DUE TO 2019 NOVEL CORONAVIRUS: ICD-10-CM

## 2021-03-26 DIAGNOSIS — R13.19 OTHER DYSPHAGIA: ICD-10-CM

## 2021-03-26 DIAGNOSIS — K21.9 GASTROESOPHAGEAL REFLUX DISEASE WITHOUT ESOPHAGITIS: ICD-10-CM

## 2021-03-26 DIAGNOSIS — C71.9 GLIOBLASTOMA (H): Primary | ICD-10-CM

## 2021-03-26 DIAGNOSIS — K56.49 OTHER IMPACTION OF INTESTINE (H): ICD-10-CM

## 2021-03-26 DIAGNOSIS — U07.1 PNEUMONIA DUE TO 2019 NOVEL CORONAVIRUS: ICD-10-CM

## 2021-03-26 DIAGNOSIS — E87.1 HYPONATREMIA: ICD-10-CM

## 2021-03-26 DIAGNOSIS — B00.2 PRIMARY HSV INFECTION OF MOUTH: ICD-10-CM

## 2021-03-26 DIAGNOSIS — R09.02 HYPOXIA: ICD-10-CM

## 2021-03-26 DIAGNOSIS — C71.9 GLIOBLASTOMA (H): ICD-10-CM

## 2021-03-26 DIAGNOSIS — H04.123 DRY EYES: ICD-10-CM

## 2021-03-26 DIAGNOSIS — I82.443 ACUTE DEEP VEIN THROMBOSIS (DVT) OF TIBIAL VEIN OF BOTH LOWER EXTREMITIES (H): ICD-10-CM

## 2021-03-26 DIAGNOSIS — F41.9 ANXIETY: ICD-10-CM

## 2021-03-26 DIAGNOSIS — I10 ESSENTIAL HYPERTENSION: ICD-10-CM

## 2021-03-26 LAB
ALBUMIN SERPL-MCNC: 3 G/DL (ref 3.4–5)
ALP SERPL-CCNC: 68 U/L (ref 40–150)
ALT SERPL W P-5'-P-CCNC: 39 U/L (ref 0–50)
ANION GAP SERPL CALCULATED.3IONS-SCNC: 3 MMOL/L (ref 3–14)
AST SERPL W P-5'-P-CCNC: 28 U/L (ref 0–45)
BASOPHILS # BLD AUTO: 0 10E9/L (ref 0–0.2)
BASOPHILS NFR BLD AUTO: 0.2 %
BILIRUB SERPL-MCNC: 0.4 MG/DL (ref 0.2–1.3)
BUN SERPL-MCNC: 26 MG/DL (ref 7–30)
CALCIUM SERPL-MCNC: 8.5 MG/DL (ref 8.5–10.1)
CHLORIDE SERPL-SCNC: 103 MMOL/L (ref 94–109)
CO2 SERPL-SCNC: 28 MMOL/L (ref 20–32)
CREAT SERPL-MCNC: 0.64 MG/DL (ref 0.52–1.04)
D DIMER PPP FEU-MCNC: 0.7 UG/ML FEU (ref 0–0.5)
DIFFERENTIAL METHOD BLD: ABNORMAL
EOSINOPHIL # BLD AUTO: 0 10E9/L (ref 0–0.7)
EOSINOPHIL NFR BLD AUTO: 0 %
ERYTHROCYTE [DISTWIDTH] IN BLOOD BY AUTOMATED COUNT: 14.5 % (ref 10–15)
GFR SERPL CREATININE-BSD FRML MDRD: 87 ML/MIN/{1.73_M2}
GLUCOSE SERPL-MCNC: 187 MG/DL (ref 70–99)
HCT VFR BLD AUTO: 35.5 % (ref 35–47)
HGB BLD-MCNC: 11.4 G/DL (ref 11.7–15.7)
IMM GRANULOCYTES # BLD: 0.2 10E9/L (ref 0–0.4)
IMM GRANULOCYTES NFR BLD: 1.6 %
LYMPHOCYTES # BLD AUTO: 0.1 10E9/L (ref 0.8–5.3)
LYMPHOCYTES NFR BLD AUTO: 1.1 %
MCH RBC QN AUTO: 29.1 PG (ref 26.5–33)
MCHC RBC AUTO-ENTMCNC: 32.1 G/DL (ref 31.5–36.5)
MCV RBC AUTO: 91 FL (ref 78–100)
MONOCYTES # BLD AUTO: 0.2 10E9/L (ref 0–1.3)
MONOCYTES NFR BLD AUTO: 1.8 %
NEUTROPHILS # BLD AUTO: 8.9 10E9/L (ref 1.6–8.3)
NEUTROPHILS NFR BLD AUTO: 95.3 %
NRBC # BLD AUTO: 0 10*3/UL
NRBC BLD AUTO-RTO: 0 /100
NT-PROBNP SERPL-MCNC: 151 PG/ML (ref 0–1800)
PLATELET # BLD AUTO: 180 10E9/L (ref 150–450)
POTASSIUM SERPL-SCNC: 4.1 MMOL/L (ref 3.4–5.3)
PROT SERPL-MCNC: 6.3 G/DL (ref 6.8–8.8)
RBC # BLD AUTO: 3.92 10E12/L (ref 3.8–5.2)
SODIUM SERPL-SCNC: 134 MMOL/L (ref 133–144)
TROPONIN I SERPL-MCNC: <0.015 UG/L (ref 0–0.04)
WBC # BLD AUTO: 9.4 10E9/L (ref 4–11)

## 2021-03-26 PROCEDURE — 93005 ELECTROCARDIOGRAM TRACING: CPT

## 2021-03-26 PROCEDURE — 80053 COMPREHEN METABOLIC PANEL: CPT | Performed by: EMERGENCY MEDICINE

## 2021-03-26 PROCEDURE — C9803 HOPD COVID-19 SPEC COLLECT: HCPCS

## 2021-03-26 PROCEDURE — 87636 SARSCOV2 & INF A&B AMP PRB: CPT | Performed by: EMERGENCY MEDICINE

## 2021-03-26 PROCEDURE — 83880 ASSAY OF NATRIURETIC PEPTIDE: CPT | Performed by: EMERGENCY MEDICINE

## 2021-03-26 PROCEDURE — 92507 TX SP LANG VOICE COMM INDIV: CPT | Mod: GN | Performed by: SPEECH-LANGUAGE PATHOLOGIST

## 2021-03-26 PROCEDURE — 99285 EMERGENCY DEPT VISIT HI MDM: CPT | Mod: 25

## 2021-03-26 PROCEDURE — 84484 ASSAY OF TROPONIN QUANT: CPT | Performed by: EMERGENCY MEDICINE

## 2021-03-26 PROCEDURE — 85379 FIBRIN DEGRADATION QUANT: CPT | Performed by: EMERGENCY MEDICINE

## 2021-03-26 PROCEDURE — 71275 CT ANGIOGRAPHY CHEST: CPT | Mod: ME

## 2021-03-26 PROCEDURE — 120N000001 HC R&B MED SURG/OB

## 2021-03-26 PROCEDURE — 250N000011 HC RX IP 250 OP 636: Performed by: EMERGENCY MEDICINE

## 2021-03-26 PROCEDURE — 99223 1ST HOSP IP/OBS HIGH 75: CPT | Mod: AI | Performed by: HOSPITALIST

## 2021-03-26 PROCEDURE — 250N000009 HC RX 250: Performed by: EMERGENCY MEDICINE

## 2021-03-26 PROCEDURE — 97110 THERAPEUTIC EXERCISES: CPT | Mod: GP,59 | Performed by: PHYSICAL THERAPIST

## 2021-03-26 PROCEDURE — 85025 COMPLETE CBC W/AUTO DIFF WBC: CPT | Performed by: EMERGENCY MEDICINE

## 2021-03-26 RX ORDER — TEMOZOLOMIDE 140 MG/1
75 CAPSULE ORAL DAILY
Qty: 42 CAPSULE | Refills: 0 | Status: ON HOLD | OUTPATIENT
Start: 2021-03-26 | End: 2021-04-09

## 2021-03-26 RX ORDER — IOPAMIDOL 755 MG/ML
61 INJECTION, SOLUTION INTRAVASCULAR ONCE
Status: COMPLETED | OUTPATIENT
Start: 2021-03-26 | End: 2021-03-26

## 2021-03-26 RX ADMIN — IOPAMIDOL 61 ML: 755 INJECTION, SOLUTION INTRAVENOUS at 21:07

## 2021-03-26 RX ADMIN — SODIUM CHLORIDE 87 ML: 9 INJECTION, SOLUTION INTRAVENOUS at 21:08

## 2021-03-26 ASSESSMENT — ENCOUNTER SYMPTOMS: SHORTNESS OF BREATH: 1

## 2021-03-26 ASSESSMENT — MIFFLIN-ST. JEOR: SCORE: 1162.67

## 2021-03-26 NOTE — TELEPHONE ENCOUNTER
"Oral Chemotherapy Monitoring Program    Primary Oncologist: Samuel  Primary Oncology Clinic: [facility]  Cancer Diagnosis:     Drug:  left frontoparietal glioblastoma (IDH wild-type, MGMT promoter methylated),  Start Date: When radiation is planned   Expected duration of therapy: cont for 42 days of radiation     Drug Interaction Assessment: no drug-drug interactions to date    Lab Monitoring Plan  Weekly cbc d/p, cmp  Subjective/Objective:  Olga Bailey is a 75 year old female contacted by phone for an initial visit for oral chemotherapy education.      ORAL CHEMOTHERAPY 3/26/2021   Assessment Type New Teach   Diagnosis Code Brain Cancer - Glioblastoma   Providers Samuel   Clinic Name/Location [facility]   Drug Name Temodar (temozolomide)   Dose 140 mg   Current Schedule Daily   Cycle Details Continuous for 42 days during XRT       Vitals:  BP:   BP Readings from Last 1 Encounters:   03/23/21 136/78     Wt Readings from Last 1 Encounters:   03/23/21 70 kg (154 lb 6.4 oz)     Estimated body surface area is 1.76 meters squared as calculated from the following:    Height as of 3/23/21: 1.6 m (5' 3\").    Weight as of 3/23/21: 70 kg (154 lb 6.4 oz).      Labs:  _  Result Component Current Result Ref Range   Sodium 136 (3/23/2021) 133 - 144 mmol/L     _  Result Component Current Result Ref Range   Potassium 4.1 (3/23/2021) 3.4 - 5.3 mmol/L     _  Result Component Current Result Ref Range   Calcium 8.8 (3/23/2021) 8.5 - 10.1 mg/dL     No results found for Mag within last 30 days.     No results found for Phos within last 30 days.     _  Result Component Current Result Ref Range   Albumin 3.4 (3/23/2021) 3.4 - 5.0 g/dL     _  Result Component Current Result Ref Range   Urea Nitrogen 28 (3/23/2021) 7 - 30 mg/dL     _  Result Component Current Result Ref Range   Creatinine 0.52 (3/23/2021) 0.52 - 1.04 mg/dL       _  Result Component Current Result Ref Range   AST 15 (3/23/2021) 0 - 45 U/L "     _  Result Component Current Result Ref Range   ALT 38 (3/23/2021) 0 - 50 U/L     _  Result Component Current Result Ref Range   Bilirubin Total 0.4 (3/23/2021) 0.2 - 1.3 mg/dL       _  Result Component Current Result Ref Range   WBC 10.0 (3/23/2021) 4.0 - 11.0 10e9/L     _  Result Component Current Result Ref Range   Hemoglobin 11.6 (L) (3/23/2021) 11.7 - 15.7 g/dL     _  Result Component Current Result Ref Range   Platelet Count 176 (3/23/2021) 150 - 450 10e9/L     _  Result Component Current Result Ref Range   Absolute Neutrophil 8.9 (H) (3/23/2021) 1.6 - 8.3 10e9/L     Assessment:  Patient is appropriate to start therapy.    Plan:  Basic chemotherapy teaching was reviewed with the patient and the patient's caregiver including indication, start date of therapy, dose, administration, adverse effects, missed doses, food and drug interactions, monitoring, side effect management, office contact information, and safe handling. Written materials were provided and all questions answered.    Follow-Up:  Labs and call 1 week after the start of therapy      Curt Mello, PharmD, BCOP  March 26, 2021

## 2021-03-27 LAB
ALBUMIN SERPL-MCNC: 3 G/DL (ref 3.4–5)
ALP SERPL-CCNC: 69 U/L (ref 40–150)
ALT SERPL W P-5'-P-CCNC: 36 U/L (ref 0–50)
ANION GAP SERPL CALCULATED.3IONS-SCNC: 3 MMOL/L (ref 3–14)
AST SERPL W P-5'-P-CCNC: 20 U/L (ref 0–45)
BASOPHILS # BLD AUTO: 0 10E9/L (ref 0–0.2)
BASOPHILS NFR BLD AUTO: 0.4 %
BILIRUB SERPL-MCNC: 0.5 MG/DL (ref 0.2–1.3)
BUN SERPL-MCNC: 21 MG/DL (ref 7–30)
CALCIUM SERPL-MCNC: 8.7 MG/DL (ref 8.5–10.1)
CHLORIDE SERPL-SCNC: 104 MMOL/L (ref 94–109)
CO2 SERPL-SCNC: 30 MMOL/L (ref 20–32)
CREAT SERPL-MCNC: 0.56 MG/DL (ref 0.52–1.04)
DIFFERENTIAL METHOD BLD: ABNORMAL
EOSINOPHIL # BLD AUTO: 0 10E9/L (ref 0–0.7)
EOSINOPHIL NFR BLD AUTO: 0.2 %
ERYTHROCYTE [DISTWIDTH] IN BLOOD BY AUTOMATED COUNT: 14.6 % (ref 10–15)
FLUAV RNA RESP QL NAA+PROBE: NEGATIVE
FLUBV RNA RESP QL NAA+PROBE: NEGATIVE
GFR SERPL CREATININE-BSD FRML MDRD: >90 ML/MIN/{1.73_M2}
GLUCOSE SERPL-MCNC: 99 MG/DL (ref 70–99)
HCT VFR BLD AUTO: 37.1 % (ref 35–47)
HGB BLD-MCNC: 12 G/DL (ref 11.7–15.7)
IMM GRANULOCYTES # BLD: 0.2 10E9/L (ref 0–0.4)
IMM GRANULOCYTES NFR BLD: 2.1 %
INTERPRETATION ECG - MUSE: NORMAL
LABORATORY COMMENT REPORT: NORMAL
LYMPHOCYTES # BLD AUTO: 0.3 10E9/L (ref 0.8–5.3)
LYMPHOCYTES NFR BLD AUTO: 3.3 %
MCH RBC QN AUTO: 29.3 PG (ref 26.5–33)
MCHC RBC AUTO-ENTMCNC: 32.3 G/DL (ref 31.5–36.5)
MCV RBC AUTO: 91 FL (ref 78–100)
MONOCYTES # BLD AUTO: 0.2 10E9/L (ref 0–1.3)
MONOCYTES NFR BLD AUTO: 2.4 %
NEUTROPHILS # BLD AUTO: 7.6 10E9/L (ref 1.6–8.3)
NEUTROPHILS NFR BLD AUTO: 91.6 %
NRBC # BLD AUTO: 0 10*3/UL
NRBC BLD AUTO-RTO: 0 /100
PLATELET # BLD AUTO: 180 10E9/L (ref 150–450)
POTASSIUM SERPL-SCNC: 3.1 MMOL/L (ref 3.4–5.3)
POTASSIUM SERPL-SCNC: 3.2 MMOL/L (ref 3.4–5.3)
POTASSIUM SERPL-SCNC: 4.3 MMOL/L (ref 3.4–5.3)
PROT SERPL-MCNC: 6.1 G/DL (ref 6.8–8.8)
RBC # BLD AUTO: 4.1 10E12/L (ref 3.8–5.2)
RSV RNA SPEC QL NAA+PROBE: NORMAL
SARS-COV-2 RNA RESP QL NAA+PROBE: NEGATIVE
SODIUM SERPL-SCNC: 137 MMOL/L (ref 133–144)
SPECIMEN SOURCE: NORMAL
WBC # BLD AUTO: 8.3 10E9/L (ref 4–11)

## 2021-03-27 PROCEDURE — 85025 COMPLETE CBC W/AUTO DIFF WBC: CPT | Performed by: HOSPITALIST

## 2021-03-27 PROCEDURE — 250N000012 HC RX MED GY IP 250 OP 636 PS 637: Performed by: HOSPITALIST

## 2021-03-27 PROCEDURE — 80053 COMPREHEN METABOLIC PANEL: CPT | Performed by: HOSPITALIST

## 2021-03-27 PROCEDURE — 99232 SBSQ HOSP IP/OBS MODERATE 35: CPT | Performed by: HOSPITALIST

## 2021-03-27 PROCEDURE — 250N000011 HC RX IP 250 OP 636: Performed by: HOSPITALIST

## 2021-03-27 PROCEDURE — 250N000009 HC RX 250: Performed by: HOSPITALIST

## 2021-03-27 PROCEDURE — 36415 COLL VENOUS BLD VENIPUNCTURE: CPT | Performed by: HOSPITALIST

## 2021-03-27 PROCEDURE — 250N000013 HC RX MED GY IP 250 OP 250 PS 637: Performed by: HOSPITALIST

## 2021-03-27 PROCEDURE — 120N000001 HC R&B MED SURG/OB

## 2021-03-27 PROCEDURE — 84132 ASSAY OF SERUM POTASSIUM: CPT | Performed by: HOSPITALIST

## 2021-03-27 RX ORDER — ALBUTEROL SULFATE 90 UG/1
2 AEROSOL, METERED RESPIRATORY (INHALATION)
Status: DISCONTINUED | OUTPATIENT
Start: 2021-03-27 | End: 2021-03-30

## 2021-03-27 RX ORDER — DOXYCYCLINE 100 MG/10ML
100 INJECTION, POWDER, LYOPHILIZED, FOR SOLUTION INTRAVENOUS EVERY 12 HOURS
Status: DISCONTINUED | OUTPATIENT
Start: 2021-03-27 | End: 2021-03-28

## 2021-03-27 RX ORDER — POLYETHYLENE GLYCOL 3350 17 G/17G
17 POWDER, FOR SOLUTION ORAL DAILY PRN
Status: DISCONTINUED | OUTPATIENT
Start: 2021-03-27 | End: 2021-04-22

## 2021-03-27 RX ORDER — AMOXICILLIN 250 MG
2 CAPSULE ORAL 2 TIMES DAILY PRN
Status: DISCONTINUED | OUTPATIENT
Start: 2021-03-27 | End: 2021-04-14

## 2021-03-27 RX ORDER — LIDOCAINE 40 MG/G
CREAM TOPICAL
Status: DISCONTINUED | OUTPATIENT
Start: 2021-03-27 | End: 2021-04-26 | Stop reason: HOSPADM

## 2021-03-27 RX ORDER — AMOXICILLIN 250 MG
1 CAPSULE ORAL 2 TIMES DAILY PRN
Status: DISCONTINUED | OUTPATIENT
Start: 2021-03-27 | End: 2021-04-14

## 2021-03-27 RX ORDER — ACETAMINOPHEN 650 MG/1
650 SUPPOSITORY RECTAL EVERY 4 HOURS PRN
Status: DISCONTINUED | OUTPATIENT
Start: 2021-03-27 | End: 2021-03-29

## 2021-03-27 RX ORDER — LANOLIN ALCOHOL/MO/W.PET/CERES
3 CREAM (GRAM) TOPICAL
Status: DISCONTINUED | OUTPATIENT
Start: 2021-03-27 | End: 2021-04-26 | Stop reason: HOSPADM

## 2021-03-27 RX ORDER — FAMOTIDINE 20 MG/1
20 TABLET, FILM COATED ORAL 2 TIMES DAILY
Status: DISCONTINUED | OUTPATIENT
Start: 2021-03-27 | End: 2021-04-04

## 2021-03-27 RX ORDER — SULFAMETHOXAZOLE/TRIMETHOPRIM 800-160 MG
1 TABLET ORAL
Status: DISCONTINUED | OUTPATIENT
Start: 2021-03-29 | End: 2021-03-27

## 2021-03-27 RX ORDER — DEXAMETHASONE 2 MG/1
4 TABLET ORAL EVERY MORNING
Status: ON HOLD | COMMUNITY
End: 2021-04-09

## 2021-03-27 RX ORDER — DEXAMETHASONE 2 MG/1
2 TABLET ORAL EVERY OTHER DAY
Status: ON HOLD | COMMUNITY
End: 2021-04-09

## 2021-03-27 RX ORDER — DEXAMETHASONE 2 MG/1
2 TABLET ORAL DAILY
Status: DISCONTINUED | OUTPATIENT
Start: 2021-03-29 | End: 2021-03-30

## 2021-03-27 RX ORDER — BISACODYL 10 MG
10 SUPPOSITORY, RECTAL RECTAL DAILY PRN
Status: DISCONTINUED | OUTPATIENT
Start: 2021-03-27 | End: 2021-04-26 | Stop reason: HOSPADM

## 2021-03-27 RX ORDER — DEXAMETHASONE 2 MG/1
2 TABLET ORAL EVERY MORNING
Status: ON HOLD | COMMUNITY
End: 2021-04-09

## 2021-03-27 RX ORDER — POTASSIUM CHLORIDE 1500 MG/1
40 TABLET, EXTENDED RELEASE ORAL ONCE
Status: COMPLETED | OUTPATIENT
Start: 2021-03-27 | End: 2021-03-27

## 2021-03-27 RX ORDER — HYDROXYZINE HYDROCHLORIDE 25 MG/1
25-50 TABLET, FILM COATED ORAL EVERY 6 HOURS PRN
Status: DISCONTINUED | OUTPATIENT
Start: 2021-03-27 | End: 2021-04-26 | Stop reason: HOSPADM

## 2021-03-27 RX ORDER — DEXAMETHASONE 4 MG/1
4 TABLET ORAL DAILY
Status: COMPLETED | OUTPATIENT
Start: 2021-03-27 | End: 2021-03-28

## 2021-03-27 RX ORDER — BUSPIRONE HYDROCHLORIDE 15 MG/1
30 TABLET ORAL 2 TIMES DAILY
Status: DISCONTINUED | OUTPATIENT
Start: 2021-03-27 | End: 2021-04-26 | Stop reason: HOSPADM

## 2021-03-27 RX ORDER — ONDANSETRON 4 MG/1
4 TABLET, ORALLY DISINTEGRATING ORAL EVERY 6 HOURS PRN
Status: DISCONTINUED | OUTPATIENT
Start: 2021-03-27 | End: 2021-04-26 | Stop reason: HOSPADM

## 2021-03-27 RX ORDER — LORAZEPAM 2 MG/ML
0.5 INJECTION INTRAMUSCULAR 2 TIMES DAILY PRN
Status: DISCONTINUED | OUTPATIENT
Start: 2021-03-27 | End: 2021-04-09

## 2021-03-27 RX ORDER — ACETAMINOPHEN 325 MG/1
650 TABLET ORAL EVERY 4 HOURS PRN
Status: DISCONTINUED | OUTPATIENT
Start: 2021-03-27 | End: 2021-04-26 | Stop reason: HOSPADM

## 2021-03-27 RX ORDER — ONDANSETRON 2 MG/ML
4 INJECTION INTRAMUSCULAR; INTRAVENOUS EVERY 6 HOURS PRN
Status: DISCONTINUED | OUTPATIENT
Start: 2021-03-27 | End: 2021-04-26 | Stop reason: HOSPADM

## 2021-03-27 RX ORDER — DEXAMETHASONE 2 MG/1
2 TABLET ORAL EVERY OTHER DAY
Status: DISCONTINUED | OUTPATIENT
Start: 2021-04-04 | End: 2021-03-30

## 2021-03-27 RX ORDER — AMLODIPINE BESYLATE 10 MG/1
10 TABLET ORAL DAILY
Status: DISCONTINUED | OUTPATIENT
Start: 2021-03-27 | End: 2021-04-25

## 2021-03-27 RX ORDER — CEFTRIAXONE 2 G/1
2 INJECTION, POWDER, FOR SOLUTION INTRAMUSCULAR; INTRAVENOUS EVERY 24 HOURS
Status: DISCONTINUED | OUTPATIENT
Start: 2021-03-27 | End: 2021-03-29

## 2021-03-27 RX ADMIN — ALBUTEROL SULFATE 2 PUFF: 90 AEROSOL, METERED RESPIRATORY (INHALATION) at 18:17

## 2021-03-27 RX ADMIN — FLUOXETINE 80 MG: 20 CAPSULE ORAL at 12:53

## 2021-03-27 RX ADMIN — DOXYCYCLINE 100 MG: 100 INJECTION, POWDER, LYOPHILIZED, FOR SOLUTION INTRAVENOUS at 01:25

## 2021-03-27 RX ADMIN — DEXAMETHASONE 4 MG: 4 TABLET ORAL at 12:53

## 2021-03-27 RX ADMIN — ENOXAPARIN SODIUM 40 MG: 40 INJECTION SUBCUTANEOUS at 00:45

## 2021-03-27 RX ADMIN — DOXYCYCLINE 100 MG: 100 INJECTION, POWDER, LYOPHILIZED, FOR SOLUTION INTRAVENOUS at 12:54

## 2021-03-27 RX ADMIN — AMLODIPINE BESYLATE 10 MG: 10 TABLET ORAL at 12:57

## 2021-03-27 RX ADMIN — POTASSIUM CHLORIDE 40 MEQ: 1500 TABLET, EXTENDED RELEASE ORAL at 11:17

## 2021-03-27 RX ADMIN — ALBUTEROL SULFATE 2 PUFF: 90 AEROSOL, METERED RESPIRATORY (INHALATION) at 15:16

## 2021-03-27 RX ADMIN — ALBUTEROL SULFATE 2 PUFF: 90 AEROSOL, METERED RESPIRATORY (INHALATION) at 09:42

## 2021-03-27 RX ADMIN — FAMOTIDINE 20 MG: 20 TABLET ORAL at 21:05

## 2021-03-27 RX ADMIN — CEFTRIAXONE SODIUM 2 G: 2 INJECTION, POWDER, FOR SOLUTION INTRAMUSCULAR; INTRAVENOUS at 00:45

## 2021-03-27 RX ADMIN — HYDROXYZINE HYDROCHLORIDE 50 MG: 25 TABLET, FILM COATED ORAL at 22:05

## 2021-03-27 RX ADMIN — BUSPIRONE HYDROCHLORIDE 30 MG: 15 TABLET ORAL at 21:05

## 2021-03-27 RX ADMIN — FAMOTIDINE 20 MG: 20 TABLET ORAL at 12:53

## 2021-03-27 RX ADMIN — BUSPIRONE HYDROCHLORIDE 30 MG: 15 TABLET ORAL at 12:53

## 2021-03-27 RX ADMIN — ALBUTEROL SULFATE 2 PUFF: 90 AEROSOL, METERED RESPIRATORY (INHALATION) at 21:05

## 2021-03-27 RX ADMIN — ACETAMINOPHEN 650 MG: 325 TABLET, FILM COATED ORAL at 15:16

## 2021-03-27 ASSESSMENT — ACTIVITIES OF DAILY LIVING (ADL)
ADLS_ACUITY_SCORE: 18
ADLS_ACUITY_SCORE: 19
ADLS_ACUITY_SCORE: 18

## 2021-03-27 NOTE — PROGRESS NOTES
RECEIVING UNIT ED HANDOFF REVIEW    ED Nurse Handoff Report was reviewed by: Dora Rondon RN on March 26, 2021 at 11:36 PM
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
## 2021-03-27 NOTE — PLAN OF CARE
DATE & TIME: 3/27 6891-7933  Cognitive Concerns/ Orientation : A/Ox4  BEHAVIOR & AGGRESSION TOOL COLOR: green  CIWA SCORE: NA   ABNL VS/O2: VSS, on RA, dyspnea with exertion   MOBILITY: SBA   PAIN MANAGMENT: denies pain intervention, only has pain when taking deep breaths  DIET: Regular diet, tolerating well  BOWEL/BLADDER: voiding adequately, continent  ABNL LAB/BG: covid negative but plan for retest  DRAIN/DEVICES: PIV R FA  TELEMETRY RHYTHM: NA  SKIN: intact, pale   TESTS/PROCEDURES: NA   D/C DAY/GOALS/PLACE: pending progress  OTHER IMPORTANT INFO: still needs sputum sample collected, recent hx of glioblastoma w/crani on 2/23, occasionally has issues with reading/dialing numbers and may require assistance when placing phone calls

## 2021-03-27 NOTE — PHARMACY-ADMISSION MEDICATION HISTORY
Pharmacy Medication History  Admission medication history interview status for the 3/26/2021  admission is complete. See EPIC admission navigator for prior to admission medications     Location of Interview: Phone  Medication history sources: Patient's family/friend (Daughter La Nena)    Significant changes made to the medication list:  Dexamethasone taper entered.     In the past week, patient estimated taking medication this percent of the time: greater than 90%    Medication reconciliation completed by provider prior to medication history? No    Time spent in this activity: 25 minutes    Prior to Admission medications    Medication Sig Last Dose Taking? Auth Provider   acetaminophen (TYLENOL) 500 MG tablet Take 500 mg by mouth 2 times daily as needed for mild pain Past Month at Unknown time Yes Unknown, Entered By History   amLODIPine (NORVASC) 10 MG tablet Take 1 tablet (10 mg) by mouth daily 3/26/2021 at am Yes Kary Munoz PA   busPIRone HCl (BUSPAR) 30 MG tablet Take 30 mg by mouth 2 times daily 3/26/2021 at am Yes Unknown, Entered By History   dexamethasone (DECADRON) 2 MG tablet Take 4 mg by mouth every morning From 3/26/21 thru 3/28/21. 3/26/2021 at AM, only thru 3/28. Yes Unknown, Entered By History   dexamethasone (DECADRON) 2 MG tablet Take 2 mg by mouth every morning March 29, 2021 thru April 2, 2021. not started at To start 3/29/21 Yes Unknown, Entered By History   dexamethasone (DECADRON) 2 MG tablet Take 2 mg by mouth every other day For 3 doses. April 4th, 6th and 8th, then stop dexamethasone. not started at to start 4/4/21 Yes Unknown, Entered By History   famotidine (PEPCID) 20 MG tablet Take 1 tablet (20 mg) by mouth 2 times daily 3/26/2021 at am Yes Kary Munoz PA   FLUoxetine (PROZAC) 20 MG capsule Take 80 mg by mouth daily 3/26/2021 at am Yes Unknown, Entered By History   hydrOXYzine (ATARAX) 25 MG tablet Take 1-2 tablets (25-50 mg) by mouth every 6 hours as needed for anxiety  or other (sleep) 3/25/2021 at 50 mg at hs Yes Kary Munoz, PA   ondansetron (ZOFRAN) 4 MG tablet Take 1 tablet (4 mg) by mouth At Bedtime 30 minutes prior to chemotherapy dosing and repeat every 8 hours as needed for nausea. not started yet  Stephanie Baker MD   sulfamethoxazole-trimethoprim (BACTRIM DS) 800-160 MG tablet Take 1 tablet by mouth Every Mon, Wed, Fri Morning Will start when chemo starts  Stephanie Baker MD   temozolomide (TEMODAR) 140 MG capsule Take 1 capsule (140 mg) by mouth daily for 42 doses Take on an empty stomach. Take a dose of nausea medication 30-60 min before temozolomide. Not started yet.  Stephanie Baker MD       The information provided in this note is only as accurate as the sources available at the time of update(s)

## 2021-03-27 NOTE — PLAN OF CARE
DATE & TIME: 3/37/2021 [id number]   Cognitive Concerns/ Orientation : A&Ox4   BEHAVIOR & AGGRESSION TOOL COLOR: Green  CIWA SCORE: NA   ABNL VS/O2: Desats on room air to 86-88% with exertion/ambulation, on 2L NC-sating mid 90s. Temp max 100.0. Gave prn Tylenol. Recheck temp 98.3. other VS stable.  MOBILITY: SBA with GB. Up in chair for meals.  PAIN MANAGMENT: Denies. Ribcage pain 3/6 with cough. Gave prn Tylenol  DIET: Regular  BOWEL/BLADDER: Continent  ABNL LAB/BG: K 3.1, replaced, recheck was 4.3. Albumin 3.0  DRAIN/DEVICES: PIV SL  TELEMETRY RHYTHM: NA  SKIN: WNL  TESTS/PROCEDURES: Retest for covid after 72 hrs (on 3/29/2021)  D/C DATE: TBD  OTHER IMPORTANT INFO: Infrequent dry nonproductive coughs. Encouraged to use IS. Ambulated in room.   MD/RN ROUNDING SIGNED OFF D/E SHIFT: Yes  COMMIT TO SIT DONE AND SIGNED OFF Yes

## 2021-03-27 NOTE — PROGRESS NOTES
[facility]    Medicine Progress Note - Hospitalist Service       Date of Admission:  3/26/2021  Assessment & Plan       Olga Bailey is a 75 year old female admitted on 3/26/2021.  past medical history that is most notable for recent craniotomy and resection of glioblastoma multiforme, as well as uncomplicated asthma, who presents with dyspnea and is found to have acute bilateral pneumonia.          Acute bilateral pneumonia  Presents with progressive dyspnea.  Afebrile without leukocytosis but left shift and lymphopenia noted on differential.  Admission CT showing bilateral patchy groundglass opacities, negative for PE.   COVID19 negative 3/26.  Trop and Pro-BNP negative.   - continue empiric ceftriaxone and doxy  - plan to repeat covid testing 3/29  - albuterol q4h while awake  - requiring 2L oxygen this AM, wean as able    Hypokalemia  - start K protocol     GBM s/p resection 2/23/21  Followed by Dr. Patterson of Neuro-Oncology.  She is in process of being set up to start chemotherapy and XRT next week.  - continue PTA Dexamethasone taper   - has not yet initiated chemo (temozolomide) or Bactrim ppx     Hypertension  - continue PTA amlodipine    Asthma  - albuterol as above    Depression and anxiety  Insomnia  - continue PTA Prozac and Buspar  - continue PTA Atarax for sleep  - had previously been taking ativan 0.5 mg bid prn for anxiety at home; daughter reports this was removed from MAR as had not been taken recently but hopes this could be available for any anxiety - have reordered    GERD  - continue PTA Pepcid     Chronic anemia  Admission hgb 11.4 consistent with recent baseline.          Diet: Combination Diet Regular Diet Adult    DVT Prophylaxis: Enoxaparin (Lovenox) SQ  Martino Catheter: not present  Code Status: Full Code           Disposition Plan   Expected discharge: 2 - 3 days, recommended to prior living arrangement once antibiotic plan established and weaned from  oxygen.  Entered: Doug Almaguer MD 03/27/2021, 11:27 AM       The patient's care was discussed with the Bedside Nurse, Patient and Patient's Family.    Doug Almaguer MD  Hospitalist Service  [facility]  Contact information available via [facility] Paging/Directory    ______________________________________________________________________    Interval History   Reports dyspnea with activity, minimal non-productive cough.  Endorses some myalgias and arthralgias, no headache, sore throat, changes in smell/taste, no diarrhea.  No other complaints other than has not been sleeping well recently.     Data reviewed today: I reviewed all medications, new labs and imaging results over the last 24 hours. I personally reviewed no images or EKG's today.    Physical Exam   Vital Signs: Temp: 99.1  F (37.3  C) Temp src: Oral BP: 130/82 Pulse: 68   Resp: 16 SpO2: 93 % O2 Device: Nasal cannula Oxygen Delivery: 2 LPM  Weight: 154 lbs 0 oz  General Appearance: Well nourished female in NAD  Respiratory: bilateral posterior crackles in middle and lower lung fields L>R, no wheezing or tachypnea  Cardiovascular: RRR, normal s1/s2 without murmur  GI: abdomen soft, normal bowel sounds  Skin: no peripheral edema  Other: Alert and appropriate, cranial nerves grossly intact     Data   Recent Labs   Lab 03/27/21  1052 03/27/21  0933 03/26/21  1715 03/23/21  1243   WBC  --  8.3 9.4 10.0   HGB  --  12.0 11.4* 11.6*   MCV  --  91 91 89   PLT  --  180 180 176   NA  --  137 134 136   POTASSIUM 3.2* 3.1* 4.1 4.1   CHLORIDE  --  104 103 104   CO2  --  30 28 28   BUN  --  21 26 28   CR  --  0.56 0.64 0.52   ANIONGAP  --  3 3 4   ESTIVEN  --  8.7 8.5 8.8   GLC  --  99 187* 72   ALBUMIN  --  3.0* 3.0* 3.4   PROTTOTAL  --  6.1* 6.3* 6.2*   BILITOTAL  --  0.5 0.4 0.4   ALKPHOS  --  69 68 65   ALT  --  36 39 38   AST  --  20 28 15   TROPI  --   --  <0.015  --

## 2021-03-27 NOTE — ED PROVIDER NOTES
"  History   Chief Complaint:  Shortness of Breath and Leg Swelling     HPI   Olga Bailey is a 75 year old female with recent diagnosis of glioblastoma and history of COPD and hypertension who presents with shortness of breath and leg swelling. Patient had a left parietal craniotomy for tumor resection on 2/23. The patient states that she has had increased swelling in her lower extremities left worse than right for the last 10 days. Today she began noticing a diffuse tightness in her chest along with shortness of breath making it difficult to take a deep breath. She feels that her breathing difficulty is different from her previous asthma experiences. No CP, no palpitations.    Review of Systems   Respiratory: Positive for shortness of breath.    Cardiovascular: Positive for chest pain and leg swelling.   All other systems reviewed and are negative.    Allergies:  Bacitracin  Erythromycin  Meperidine Hcl  Chloraprep One Step    Medications:  Bactrim  Zofran  Norvasc  Decadron  Pepcid  Atarax  Keppra  Colace  Buspar  Prozac    Past Medical History:    Depression  Asthma  Hypertension   COPD    Past Surgical History:    Bunionectomy  L4-L5 discectomy  Hysterectomy   Craniotomy  Rotator cuff repair  Right ovarian mass removal    Family History:    Father- mesothelioma  Mother- hypertension     Social History:  Presents with her daughter  Has a daughter    Physical Exam     Patient Vitals for the past 24 hrs:   BP Temp Temp src Pulse Resp SpO2 Height Weight   03/26/21 2009 (!) 140/79 -- -- 67 17 -- -- --   03/26/21 1701 125/68 98.3  F (36.8  C) Temporal 77 18 94 % 1.6 m (5' 3\") 69.9 kg (154 lb)       Physical Exam  General/Appearance: appears stated age, well-groomed, appears comfortable  Eyes: EOMI, no scleral injection, no icterus  ENT: MMM  Neck: supple, nl ROM, no stiffness  Cardiovascular: RRR, nl S1S2, no m/r/g, 2+ pulses in all 4 extremities, cap refill <2sec  Respiratory: CTAB, good air movement throughout, " no wheezes/rhonchi/rales, mild increased WOB, no retractions  Back: no lesions  GI: abd soft, non-distended, nttp,  no HSM, no rebound, no guarding, nl BS  MSK: DRAPER, good tone, no bony abnormality  Skin: warm and well-perfused, no rash, no edema, no ecchymosis, nl turgor  Neuro: GCS 15, alert and oriented  Psych: interacts appropriately  Heme: no petechia, no purpura, no active bleeding    Emergency Department Course   ECG  ECG taken at 2003, ECG read at 2100  Shortness of breath    No significant change as compared to prior, dated 2/23/21.  Rate 67 bpm. AZ interval 172 ms. QRS duration 78 ms. QT/QTc 436/460 ms. P-R-T axes 40 16 45. Normal sinus rhythm. Minimal voltage for LVH, may be normal variant. Nonspecific ST abnormality.      Imaging:  CT Chest Pulmonary Embolism w Contrast  1.  No evidence for pulmonary embolism.   2.  Patchy groundglass opacities throughout both lungs are   nonspecific, but could be infectious in etiology. COVID-19 pneumonia   could have this appearance.   As read by Radiology.     Laboratory:   CBC: WBC 9.4, HGB 11.4 (L),   CMP: glucose 187 (H), albumin 3.0 (L), protein total 6.3 (L) o/w WNL (Creatinine 0.64)       Troponin (Collected 1715): <0.015     Ddimer: 0.7 (H)     BNP: 151    Symptomatic Influenza A/B & SARS-CoV2 (COVID19) Virus PCR Multiplex: pending      Emergency Department Course:    Reviewed:  I reviewed nursing notes, vitals, past medical history and care everywhere    Assessments:  1955 I obtained history and examined the patient as noted above.   2200 I rechecked the patient and explained findings.   2222 Patient desats to 87-88% with any form of movement  2223 I rechecked the patient and updated her on plan.     Consults:   2242 I spoke with Dr. Recinos of the Hospitalist service    Disposition:  The patient was admitted to the hospital under the care of Dr. Recinos.     Impression & Plan     Medical Decision Making:  This patient is a pleasant 75-year-old female  with recent diagnosis of GBM status post craniotomy for tumor resection in February who presents today with edema to bilateral lower extremities but more importantly diffuse chest tightness, fatigue, general failure to thrive.  D-dimer was minimally bumped so follow-up CT was obtained to look for pneumonia, pleural effusion, pneumothorax, PE.  All of these were relatively unremarkable with the exception of findings suggestive of Covid.  A swab is pending.  With minimal exertion she desats to 87% and takes approximately 5 to 10 minutes to regain oxygen saturations into the 90s.  Because of this she was offered admission which she accepted.  I considered other etiologies also such as congestive heart failure, ACS however BNP, troponin, EKG are all unremarkable.  He admitted to the hospitalist service for further work-up.  Of note she was not given steroids in the ER as she is already on Decadron.    Covid-19  Olga Bailey was evaluated during a global COVID-19 pandemic, which necessitated consideration that the patient might be at risk for infection with the SARS-CoV-2 virus that causes COVID-19.   Applicable protocols for evaluation were followed during the patient's care.   COVID-19 was considered as part of the patient's evaluation. The plan for testing is:  a test was obtained during this visit.    Diagnosis:    ICD-10-CM    1. Pneumonia due to 2019 novel coronavirus  U07.1     J12.82    2. Hypoxia  R09.02        Scribe Disclosure:  LUDY, Jennyfer Dejesus, am serving as a scribe at 7:46 PM on 3/26/2021 to document services personally performed by Jocelyn Dietz MD based on my observations and the provider's statements to me.             Jocelyn Dietz MD  03/26/21 2583

## 2021-03-27 NOTE — PROVIDER NOTIFICATION
MD Notification    Notified Person: MD    Notified Person Name: Tripp    Notification Date/Time: 3/27/2021 @1032    Notification Interaction: web paged     Purpose of Notification: Potassium level 3.1 this am. Orders for replacement?    Orders Received: Replacement orders in     Comments:

## 2021-03-28 LAB
POTASSIUM SERPL-SCNC: 3.8 MMOL/L (ref 3.4–5.3)
PROCALCITONIN SERPL-MCNC: 0.05 NG/ML

## 2021-03-28 PROCEDURE — 99232 SBSQ HOSP IP/OBS MODERATE 35: CPT | Performed by: HOSPITALIST

## 2021-03-28 PROCEDURE — 250N000011 HC RX IP 250 OP 636: Performed by: HOSPITALIST

## 2021-03-28 PROCEDURE — 84145 PROCALCITONIN (PCT): CPT | Performed by: HOSPITALIST

## 2021-03-28 PROCEDURE — 250N000013 HC RX MED GY IP 250 OP 250 PS 637: Performed by: HOSPITALIST

## 2021-03-28 PROCEDURE — 84132 ASSAY OF SERUM POTASSIUM: CPT | Performed by: HOSPITALIST

## 2021-03-28 PROCEDURE — 36415 COLL VENOUS BLD VENIPUNCTURE: CPT | Performed by: HOSPITALIST

## 2021-03-28 PROCEDURE — 250N000012 HC RX MED GY IP 250 OP 636 PS 637: Performed by: HOSPITALIST

## 2021-03-28 PROCEDURE — 120N000001 HC R&B MED SURG/OB

## 2021-03-28 PROCEDURE — 250N000009 HC RX 250: Performed by: HOSPITALIST

## 2021-03-28 RX ORDER — DOXYCYCLINE 100 MG/1
100 CAPSULE ORAL EVERY 12 HOURS SCHEDULED
Status: DISCONTINUED | OUTPATIENT
Start: 2021-03-28 | End: 2021-04-05

## 2021-03-28 RX ADMIN — BUSPIRONE HYDROCHLORIDE 30 MG: 15 TABLET ORAL at 21:41

## 2021-03-28 RX ADMIN — ALBUTEROL SULFATE 2 PUFF: 90 AEROSOL, METERED RESPIRATORY (INHALATION) at 17:36

## 2021-03-28 RX ADMIN — ALBUTEROL SULFATE 2 PUFF: 90 AEROSOL, METERED RESPIRATORY (INHALATION) at 10:05

## 2021-03-28 RX ADMIN — HYDROXYZINE HYDROCHLORIDE 50 MG: 25 TABLET, FILM COATED ORAL at 21:41

## 2021-03-28 RX ADMIN — CEFTRIAXONE SODIUM 2 G: 2 INJECTION, POWDER, FOR SOLUTION INTRAMUSCULAR; INTRAVENOUS at 00:03

## 2021-03-28 RX ADMIN — FAMOTIDINE 20 MG: 20 TABLET ORAL at 21:41

## 2021-03-28 RX ADMIN — DEXAMETHASONE 4 MG: 4 TABLET ORAL at 10:05

## 2021-03-28 RX ADMIN — DOXYCYCLINE 100 MG: 100 INJECTION, POWDER, LYOPHILIZED, FOR SOLUTION INTRAVENOUS at 00:41

## 2021-03-28 RX ADMIN — BUSPIRONE HYDROCHLORIDE 30 MG: 15 TABLET ORAL at 10:05

## 2021-03-28 RX ADMIN — ENOXAPARIN SODIUM 40 MG: 40 INJECTION SUBCUTANEOUS at 00:02

## 2021-03-28 RX ADMIN — DOXYCYCLINE 100 MG: 100 CAPSULE ORAL at 21:41

## 2021-03-28 RX ADMIN — ALBUTEROL SULFATE 2 PUFF: 90 AEROSOL, METERED RESPIRATORY (INHALATION) at 05:10

## 2021-03-28 RX ADMIN — ALBUTEROL SULFATE 2 PUFF: 90 AEROSOL, METERED RESPIRATORY (INHALATION) at 13:10

## 2021-03-28 RX ADMIN — FLUOXETINE 80 MG: 20 CAPSULE ORAL at 10:06

## 2021-03-28 RX ADMIN — GUAIFENESIN 10 ML: 200 SOLUTION ORAL at 05:47

## 2021-03-28 RX ADMIN — ALBUTEROL SULFATE 2 PUFF: 90 AEROSOL, METERED RESPIRATORY (INHALATION) at 21:42

## 2021-03-28 RX ADMIN — FAMOTIDINE 20 MG: 20 TABLET ORAL at 10:05

## 2021-03-28 RX ADMIN — DOXYCYCLINE 100 MG: 100 CAPSULE ORAL at 13:10

## 2021-03-28 RX ADMIN — AMLODIPINE BESYLATE 10 MG: 10 TABLET ORAL at 10:05

## 2021-03-28 RX ADMIN — HYDROXYZINE HYDROCHLORIDE 25 MG: 25 TABLET, FILM COATED ORAL at 10:23

## 2021-03-28 ASSESSMENT — ACTIVITIES OF DAILY LIVING (ADL)
ADLS_ACUITY_SCORE: 18

## 2021-03-28 NOTE — PLAN OF CARE
DATE & TIME: 3/37/2021 2167-3808    Cognitive Concerns/ Orientation : A&Ox4   BEHAVIOR & AGGRESSION TOOL COLOR: Green  CIWA SCORE: NA   ABNL VS/O2: VSS on 2L NC. Was able to wean down to room air while up in chair. Desat in sleep to 88-89%, put back on 2L NC sats in mid 90s.  MOBILITY: SBA. Up in chair for meals.  PAIN MANAGMENT: Denies. Ribcage pain 3/6 with cough. Declined pain meds  DIET: Regular  BOWEL/BLADDER: Continent  ABNL LAB/BG: K 3.1, replaced, albumin 3.0  DRAIN/DEVICES: PIV SL  TELEMETRY RHYTHM: NA  SKIN: WNL  TESTS/PROCEDURES: Retest for covid after 72 hrs (on 3/29/2021)  D/C DATE: TBD  OTHER IMPORTANT INFO: Infrequent dry nonproductive coughs. Encouraged to use IS. Ambulated in room.

## 2021-03-28 NOTE — PROGRESS NOTES
[facility]    Medicine Progress Note - Hospitalist Service       Date of Admission:  3/26/2021  Assessment & Plan       Olga Bailey is a 75 year old female admitted on 3/26/2021.  past medical history that is most notable for recent craniotomy and resection of glioblastoma multiforme, as well as uncomplicated asthma, who presents with dyspnea and is found to have acute bilateral pneumonia.          Acute bilateral pneumonia  Presents with progressive dyspnea.  Afebrile without leukocytosis but left shift and lymphopenia noted on differential.  Admission CT showing bilateral patchy groundglass opacities, negative for PE.   COVID19 negative 3/26.  Trop and Pro-BNP negative.   - continue empiric ceftriaxone and doxy (transition to oral doxy today)  - plan to repeat covid testing 3/29  - albuterol q4h while awake  - stable on 2L oxygen, wean as able    Hypokalemia  - K protocol     GBM s/p resection 2/23/21  Followed by Dr. Patterson of Neuro-Oncology.  She is in process of being set up to start chemotherapy and XRT next week.  - continue PTA Dexamethasone taper   - has not yet initiated chemo (temozolomide) or Bactrim ppx  - per daughter, some family members are concerned with her receiving lovenox in setting of neurosurgery on 2/23; will hold lovenox overnight and try to contact neuro-onc tomorrow regarding their thoughts (at this time feel risk of hypercoagulable state of malignancy and possible covid outweighs any bleeding risk); this will also allow us to re-time her injection so as not to be given at midnight     Hypertension  - continue PTA amlodipine    Asthma  - albuterol as above    Depression and anxiety  Insomnia  - continue PTA Prozac and Buspar  - continue PTA Atarax for sleep  - prn lorazepam    GERD  - continue PTA Pepcid     Chronic anemia  Admission hgb 11.4 consistent with recent baseline.          Diet: Combination Diet Regular Diet Adult    DVT Prophylaxis: Enoxaparin  (Lovenox) subcutaneous (will hold overnight)  Martino Catheter: not present  Code Status: Full Code           Disposition Plan   Expected discharge: 2 - 3 days, recommended to prior living arrangement once antibiotic plan established and weaned from oxygen.  Entered: Doug Almaguer MD 03/28/2021, 12:42 PM       The patient's care was discussed with the Bedside Nurse, Patient and Patient's Family.    Doug Almaguer MD  Hospitalist Service  [facility]  Contact information available via [facility] Paging/Directory    ______________________________________________________________________    Interval History   Reports no change in dyspnea today.  No fever/chills.  Appetite isn't great.  Denies any nausea, diarrhea, rash.  No other complaints.     Data reviewed today: I reviewed all medications, new labs and imaging results over the last 24 hours. I personally reviewed no images or EKG's today.    Physical Exam   Vital Signs: Temp: 97.3  F (36.3  C) Temp src: Oral BP: 137/80 Pulse: 84   Resp: 16 SpO2: (!) 88 % O2 Device: Nasal cannula Oxygen Delivery: 2 LPM  Weight: 154 lbs 0 oz  General Appearance: Well nourished female in NAD, sitting up in chair  Respiratory: mild bibasilar crackles, middle lobe crackles improved today, no wheezing or tachypnea  Cardiovascular: RRR, normal s1/s2 without murmur  GI: abdomen soft, normal bowel sounds  Skin: no peripheral edema  Other: Alert and appropriate, cranial nerves grossly intact     Data   Recent Labs   Lab 03/28/21  0816 03/27/21  1530 03/27/21  1052 03/27/21  0933 03/26/21  1715 03/23/21  1243   WBC  --   --   --  8.3 9.4 10.0   HGB  --   --   --  12.0 11.4* 11.6*   MCV  --   --   --  91 91 89   PLT  --   --   --  180 180 176   NA  --   --   --  137 134 136   POTASSIUM 3.8 4.3 3.2* 3.1* 4.1 4.1   CHLORIDE  --   --   --  104 103 104   CO2  --   --   --  30 28 28   BUN  --   --   --  21 26 28   CR  --   --   --  0.56 0.64 0.52   ANIONGAP  --   --   --  3 3 4    ESTIVEN  --   --   --  8.7 8.5 8.8   GLC  --   --   --  99 187* 72   ALBUMIN  --   --   --  3.0* 3.0* 3.4   PROTTOTAL  --   --   --  6.1* 6.3* 6.2*   BILITOTAL  --   --   --  0.5 0.4 0.4   ALKPHOS  --   --   --  69 68 65   ALT  --   --   --  36 39 38   AST  --   --   --  20 28 15   TROPI  --   --   --   --  <0.015  --

## 2021-03-28 NOTE — PLAN OF CARE
DATE & TIME: 3/28 9751-2874    Cognitive Concerns/ Orientation : A&Ox4, calm & cooperative. Anxious at times  BEHAVIOR & AGGRESSION TOOL COLOR: Green  CIWA SCORE: NA   ABNL VS/O2: VSS 2.5 L NC. Dyspnea on exertion  MOBILITY: A1 GB, ambulated in room. Up to chair for meals. Generalized weakness  PAIN MANAGMENT: Denies pain, PRN atarax x1 for anxiety.   DIET: Tolerating reg diet, decreased appetite.  BOWEL/BLADDER: Continent of b/b. Ambulates to BR.   ABNL LAB/BG: K+ replacement protocol. AM recheck 3.8. Sputum collection needed.  DRAIN/DEVICES: L forearm PIV SL, intermittent abx.  TELEMETRY RHYTHM: NA  SKIN: Bruising to forearms, upper arms.   TESTS/PROCEDURES: Retest for COVID in 72 hrs, scheduled for 3/29  D/C DAY/GOALS/PLACE: Plan pending.   OTHER IMPORTANT INFO: COVID precautions. Encourage I/S, infrequent dry nonproductive cough. Lung sounds w/ fine crackles.

## 2021-03-29 ENCOUNTER — APPOINTMENT (OUTPATIENT)
Dept: CARDIOLOGY | Facility: CLINIC | Age: 76
DRG: 166 | End: 2021-03-29
Attending: HOSPITALIST
Payer: MEDICARE

## 2021-03-29 ENCOUNTER — TELEPHONE (OUTPATIENT)
Dept: ONCOLOGY | Facility: CLINIC | Age: 76
End: 2021-03-29

## 2021-03-29 ENCOUNTER — ANESTHESIA EVENT (OUTPATIENT)
Dept: MEDSURG UNIT | Facility: CLINIC | Age: 76
DRG: 166 | End: 2021-03-29
Payer: MEDICARE

## 2021-03-29 ENCOUNTER — APPOINTMENT (OUTPATIENT)
Dept: GENERAL RADIOLOGY | Facility: CLINIC | Age: 76
DRG: 166 | End: 2021-03-29
Attending: HOSPITALIST
Payer: MEDICARE

## 2021-03-29 ENCOUNTER — APPOINTMENT (OUTPATIENT)
Dept: GENERAL RADIOLOGY | Facility: CLINIC | Age: 76
DRG: 166 | End: 2021-03-29
Attending: NURSE PRACTITIONER
Payer: MEDICARE

## 2021-03-29 ENCOUNTER — APPOINTMENT (OUTPATIENT)
Dept: ULTRASOUND IMAGING | Facility: CLINIC | Age: 76
DRG: 166 | End: 2021-03-29
Attending: HOSPITALIST
Payer: MEDICARE

## 2021-03-29 ENCOUNTER — ANESTHESIA (OUTPATIENT)
Dept: MEDSURG UNIT | Facility: CLINIC | Age: 76
DRG: 166 | End: 2021-03-29
Payer: MEDICARE

## 2021-03-29 LAB
ALBUMIN SERPL-MCNC: 2.8 G/DL (ref 3.4–5)
ALP SERPL-CCNC: 74 U/L (ref 40–150)
ALT SERPL W P-5'-P-CCNC: 36 U/L (ref 0–50)
ANION GAP SERPL CALCULATED.3IONS-SCNC: 3 MMOL/L (ref 3–14)
ANION GAP SERPL CALCULATED.3IONS-SCNC: 3 MMOL/L (ref 3–14)
APTT PPP: 29 SEC (ref 22–37)
AST SERPL W P-5'-P-CCNC: 26 U/L (ref 0–45)
BASE EXCESS BLDA CALC-SCNC: 3.4 MMOL/L
BASE EXCESS BLDA CALC-SCNC: 5.1 MMOL/L
BASOPHILS # BLD AUTO: 0 10E9/L (ref 0–0.2)
BASOPHILS NFR BLD AUTO: 0.3 %
BILIRUB SERPL-MCNC: 0.3 MG/DL (ref 0.2–1.3)
BUN SERPL-MCNC: 19 MG/DL (ref 7–30)
BUN SERPL-MCNC: 21 MG/DL (ref 7–30)
CALCIUM SERPL-MCNC: 8.8 MG/DL (ref 8.5–10.1)
CALCIUM SERPL-MCNC: 8.8 MG/DL (ref 8.5–10.1)
CHLORIDE SERPL-SCNC: 100 MMOL/L (ref 94–109)
CHLORIDE SERPL-SCNC: 99 MMOL/L (ref 94–109)
CO2 SERPL-SCNC: 29 MMOL/L (ref 20–32)
CO2 SERPL-SCNC: 30 MMOL/L (ref 20–32)
CREAT SERPL-MCNC: 0.51 MG/DL (ref 0.52–1.04)
CREAT SERPL-MCNC: 0.57 MG/DL (ref 0.52–1.04)
CRP SERPL-MCNC: 116 MG/L (ref 0–8)
D DIMER PPP FEU-MCNC: 1.3 UG/ML FEU (ref 0–0.5)
DIFFERENTIAL METHOD BLD: ABNORMAL
EOSINOPHIL # BLD AUTO: 0 10E9/L (ref 0–0.7)
EOSINOPHIL NFR BLD AUTO: 0 %
ERYTHROCYTE [DISTWIDTH] IN BLOOD BY AUTOMATED COUNT: 14.6 % (ref 10–15)
ERYTHROCYTE [DISTWIDTH] IN BLOOD BY AUTOMATED COUNT: 15 % (ref 10–15)
FLUAV RNA RESP QL NAA+PROBE: NEGATIVE
FLUBV RNA RESP QL NAA+PROBE: NEGATIVE
GFR SERPL CREATININE-BSD FRML MDRD: >90 ML/MIN/{1.73_M2}
GFR SERPL CREATININE-BSD FRML MDRD: >90 ML/MIN/{1.73_M2}
GLUCOSE SERPL-MCNC: 156 MG/DL (ref 70–99)
GLUCOSE SERPL-MCNC: 83 MG/DL (ref 70–99)
HCO3 BLD-SCNC: 26 MMOL/L (ref 21–28)
HCO3 BLD-SCNC: 28 MMOL/L (ref 21–28)
HCT VFR BLD AUTO: 34.2 % (ref 35–47)
HCT VFR BLD AUTO: 34.5 % (ref 35–47)
HGB BLD-MCNC: 11.2 G/DL (ref 11.7–15.7)
HGB BLD-MCNC: 11.4 G/DL (ref 11.7–15.7)
IMM GRANULOCYTES # BLD: 0.2 10E9/L (ref 0–0.4)
IMM GRANULOCYTES NFR BLD: 2.2 %
LABORATORY COMMENT REPORT: NORMAL
LACTATE BLD-SCNC: 2.7 MMOL/L (ref 0.7–2)
LDH SERPL L TO P-CCNC: 458 U/L (ref 81–234)
LYMPHOCYTES # BLD AUTO: 0.2 10E9/L (ref 0.8–5.3)
LYMPHOCYTES NFR BLD AUTO: 2 %
MCH RBC QN AUTO: 28.9 PG (ref 26.5–33)
MCH RBC QN AUTO: 29.2 PG (ref 26.5–33)
MCHC RBC AUTO-ENTMCNC: 32.7 G/DL (ref 31.5–36.5)
MCHC RBC AUTO-ENTMCNC: 33 G/DL (ref 31.5–36.5)
MCV RBC AUTO: 88 FL (ref 78–100)
MCV RBC AUTO: 88 FL (ref 78–100)
MONOCYTES # BLD AUTO: 0.2 10E9/L (ref 0–1.3)
MONOCYTES NFR BLD AUTO: 1.7 %
NEUTROPHILS # BLD AUTO: 8.6 10E9/L (ref 1.6–8.3)
NEUTROPHILS NFR BLD AUTO: 93.8 %
NRBC # BLD AUTO: 0 10*3/UL
NRBC BLD AUTO-RTO: 0 /100
NT-PROBNP SERPL-MCNC: 138 PG/ML (ref 0–1800)
NT-PROBNP SERPL-MCNC: 182 PG/ML (ref 0–1800)
O2/TOTAL GAS SETTING VFR VENT: 50 %
OXYHGB MFR BLD: 90 % (ref 92–100)
OXYHGB MFR BLD: 93 % (ref 92–100)
PCO2 BLD: 33 MM HG (ref 35–45)
PCO2 BLD: 35 MM HG (ref 35–45)
PH BLD: 7.51 PH (ref 7.35–7.45)
PH BLD: 7.51 PH (ref 7.35–7.45)
PLATELET # BLD AUTO: 171 10E9/L (ref 150–450)
PLATELET # BLD AUTO: 180 10E9/L (ref 150–450)
PO2 BLD: 57 MM HG (ref 80–105)
PO2 BLD: 67 MM HG (ref 80–105)
POTASSIUM SERPL-SCNC: 3.7 MMOL/L (ref 3.4–5.3)
POTASSIUM SERPL-SCNC: 3.8 MMOL/L (ref 3.4–5.3)
PROCALCITONIN SERPL-MCNC: 0.06 NG/ML
PROT SERPL-MCNC: 6.7 G/DL (ref 6.8–8.8)
RBC # BLD AUTO: 3.88 10E12/L (ref 3.8–5.2)
RBC # BLD AUTO: 3.91 10E12/L (ref 3.8–5.2)
RSV RNA SPEC QL NAA+PROBE: NORMAL
SARS-COV-2 RNA RESP QL NAA+PROBE: NEGATIVE
SODIUM SERPL-SCNC: 131 MMOL/L (ref 133–144)
SODIUM SERPL-SCNC: 133 MMOL/L (ref 133–144)
SPECIMEN SOURCE: NORMAL
TROPONIN I SERPL-MCNC: <0.015 UG/L (ref 0–0.04)
TROPONIN I SERPL-MCNC: <0.015 UG/L (ref 0–0.04)
UFH PPP CHRO-ACNC: >1.1 IU/ML
WBC # BLD AUTO: 9.2 10E9/L (ref 4–11)
WBC # BLD AUTO: 9.2 10E9/L (ref 4–11)

## 2021-03-29 PROCEDURE — 999N000208 ECHOCARDIOGRAM COMPLETE

## 2021-03-29 PROCEDURE — 93306 TTE W/DOPPLER COMPLETE: CPT | Mod: 26 | Performed by: INTERNAL MEDICINE

## 2021-03-29 PROCEDURE — 999N000011 HC STATISTIC ANESTHESIA CASE

## 2021-03-29 PROCEDURE — 85730 THROMBOPLASTIN TIME PARTIAL: CPT | Performed by: HOSPITALIST

## 2021-03-29 PROCEDURE — 250N000011 HC RX IP 250 OP 636: Performed by: HOSPITALIST

## 2021-03-29 PROCEDURE — 250N000009 HC RX 250: Performed by: NURSE PRACTITIONER

## 2021-03-29 PROCEDURE — 87486 CHLMYD PNEUM DNA AMP PROBE: CPT | Performed by: HOSPITALIST

## 2021-03-29 PROCEDURE — 87581 M.PNEUMON DNA AMP PROBE: CPT | Performed by: HOSPITALIST

## 2021-03-29 PROCEDURE — 36415 COLL VENOUS BLD VENIPUNCTURE: CPT | Performed by: NURSE PRACTITIONER

## 2021-03-29 PROCEDURE — 71046 X-RAY EXAM CHEST 2 VIEWS: CPT

## 2021-03-29 PROCEDURE — 83605 ASSAY OF LACTIC ACID: CPT | Performed by: NURSE PRACTITIONER

## 2021-03-29 PROCEDURE — 120N000001 HC R&B MED SURG/OB

## 2021-03-29 PROCEDURE — 85027 COMPLETE CBC AUTOMATED: CPT | Performed by: HOSPITALIST

## 2021-03-29 PROCEDURE — 93010 ELECTROCARDIOGRAM REPORT: CPT | Mod: 76 | Performed by: INTERNAL MEDICINE

## 2021-03-29 PROCEDURE — 250N000013 HC RX MED GY IP 250 OP 250 PS 637: Performed by: HOSPITALIST

## 2021-03-29 PROCEDURE — 250N000011 HC RX IP 250 OP 636: Performed by: INTERNAL MEDICINE

## 2021-03-29 PROCEDURE — 250N000009 HC RX 250: Performed by: NURSE ANESTHETIST, CERTIFIED REGISTERED

## 2021-03-29 PROCEDURE — 86140 C-REACTIVE PROTEIN: CPT | Performed by: NURSE PRACTITIONER

## 2021-03-29 PROCEDURE — 84484 ASSAY OF TROPONIN QUANT: CPT | Performed by: NURSE PRACTITIONER

## 2021-03-29 PROCEDURE — 83880 ASSAY OF NATRIURETIC PEPTIDE: CPT | Performed by: NURSE PRACTITIONER

## 2021-03-29 PROCEDURE — 87040 BLOOD CULTURE FOR BACTERIA: CPT | Performed by: NURSE PRACTITIONER

## 2021-03-29 PROCEDURE — 85520 HEPARIN ASSAY: CPT | Performed by: HOSPITALIST

## 2021-03-29 PROCEDURE — 99291 CRITICAL CARE FIRST HOUR: CPT | Performed by: NURSE PRACTITIONER

## 2021-03-29 PROCEDURE — 84484 ASSAY OF TROPONIN QUANT: CPT | Performed by: HOSPITALIST

## 2021-03-29 PROCEDURE — 85379 FIBRIN DEGRADATION QUANT: CPT | Performed by: HOSPITALIST

## 2021-03-29 PROCEDURE — 999N000040 HC STATISTIC CONSULT NO CHARGE VASC ACCESS

## 2021-03-29 PROCEDURE — 120N000013 HC R&B IMCU

## 2021-03-29 PROCEDURE — 93005 ELECTROCARDIOGRAM TRACING: CPT

## 2021-03-29 PROCEDURE — 255N000002 HC RX 255 OP 636: Performed by: HOSPITALIST

## 2021-03-29 PROCEDURE — 83880 ASSAY OF NATRIURETIC PEPTIDE: CPT | Performed by: HOSPITALIST

## 2021-03-29 PROCEDURE — 250N000011 HC RX IP 250 OP 636: Performed by: NURSE PRACTITIONER

## 2021-03-29 PROCEDURE — 999N000185 HC STATISTIC TRANSPORT TIME EA 15 MIN

## 2021-03-29 PROCEDURE — 99233 SBSQ HOSP IP/OBS HIGH 50: CPT | Performed by: HOSPITALIST

## 2021-03-29 PROCEDURE — 87305 ASPERGILLUS AG IA: CPT | Performed by: INTERNAL MEDICINE

## 2021-03-29 PROCEDURE — 36600 WITHDRAWAL OF ARTERIAL BLOOD: CPT

## 2021-03-29 PROCEDURE — 258N000003 HC RX IP 258 OP 636: Performed by: INTERNAL MEDICINE

## 2021-03-29 PROCEDURE — 82805 BLOOD GASES W/O2 SATURATION: CPT | Performed by: NURSE PRACTITIONER

## 2021-03-29 PROCEDURE — 87633 RESP VIRUS 12-25 TARGETS: CPT | Performed by: HOSPITALIST

## 2021-03-29 PROCEDURE — 84145 PROCALCITONIN (PCT): CPT | Performed by: HOSPITALIST

## 2021-03-29 PROCEDURE — 93970 EXTREMITY STUDY: CPT

## 2021-03-29 PROCEDURE — 85025 COMPLETE CBC W/AUTO DIFF WBC: CPT | Performed by: HOSPITALIST

## 2021-03-29 PROCEDURE — 87449 NOS EACH ORGANISM AG IA: CPT | Performed by: INTERNAL MEDICINE

## 2021-03-29 PROCEDURE — 80053 COMPREHEN METABOLIC PANEL: CPT | Performed by: NURSE PRACTITIONER

## 2021-03-29 PROCEDURE — 36415 COLL VENOUS BLD VENIPUNCTURE: CPT | Performed by: HOSPITALIST

## 2021-03-29 PROCEDURE — 83615 LACTATE (LD) (LDH) ENZYME: CPT | Performed by: NURSE PRACTITIONER

## 2021-03-29 PROCEDURE — 250N000012 HC RX MED GY IP 250 OP 636 PS 637: Performed by: HOSPITALIST

## 2021-03-29 PROCEDURE — 80048 BASIC METABOLIC PNL TOTAL CA: CPT | Performed by: HOSPITALIST

## 2021-03-29 PROCEDURE — 87636 SARSCOV2 & INF A&B AMP PRB: CPT | Performed by: HOSPITALIST

## 2021-03-29 PROCEDURE — 272N000054 HC CANNULA HIGH FLOW, ADULT

## 2021-03-29 PROCEDURE — 94660 CPAP INITIATION&MGMT: CPT

## 2021-03-29 PROCEDURE — 370N000003 HC ANESTHESIA WARD SERVICE: Performed by: NURSE ANESTHETIST, CERTIFIED REGISTERED

## 2021-03-29 PROCEDURE — 71045 X-RAY EXAM CHEST 1 VIEW: CPT

## 2021-03-29 PROCEDURE — 999N000157 HC STATISTIC RCP TIME EA 10 MIN

## 2021-03-29 RX ORDER — HEPARIN SODIUM 10000 [USP'U]/100ML
0-5000 INJECTION, SOLUTION INTRAVENOUS CONTINUOUS
Status: DISCONTINUED | OUTPATIENT
Start: 2021-03-29 | End: 2021-03-29

## 2021-03-29 RX ORDER — HYDRALAZINE HYDROCHLORIDE 20 MG/ML
10 INJECTION INTRAMUSCULAR; INTRAVENOUS EVERY 4 HOURS PRN
Status: DISCONTINUED | OUTPATIENT
Start: 2021-03-29 | End: 2021-04-26 | Stop reason: HOSPADM

## 2021-03-29 RX ORDER — METHYLPREDNISOLONE SODIUM SUCCINATE 40 MG/ML
32 INJECTION, POWDER, LYOPHILIZED, FOR SOLUTION INTRAMUSCULAR; INTRAVENOUS EVERY 12 HOURS
Status: DISCONTINUED | OUTPATIENT
Start: 2021-03-29 | End: 2021-04-05

## 2021-03-29 RX ORDER — CARBOXYMETHYLCELLULOSE SODIUM 5 MG/ML
1 SOLUTION/ DROPS OPHTHALMIC
Status: DISCONTINUED | OUTPATIENT
Start: 2021-03-29 | End: 2021-04-19

## 2021-03-29 RX ORDER — HEPARIN SODIUM 10000 [USP'U]/100ML
0-5000 INJECTION, SOLUTION INTRAVENOUS CONTINUOUS
Status: DISCONTINUED | OUTPATIENT
Start: 2021-03-29 | End: 2021-04-01

## 2021-03-29 RX ORDER — LIDOCAINE HYDROCHLORIDE 20 MG/ML
INJECTION, SOLUTION INFILTRATION; PERINEURAL PRN
Status: DISCONTINUED | OUTPATIENT
Start: 2021-03-29 | End: 2021-03-29

## 2021-03-29 RX ORDER — LABETALOL HYDROCHLORIDE 5 MG/ML
10 INJECTION, SOLUTION INTRAVENOUS
Status: DISCONTINUED | OUTPATIENT
Start: 2021-03-29 | End: 2021-04-26 | Stop reason: HOSPADM

## 2021-03-29 RX ORDER — LIDOCAINE 40 MG/G
CREAM TOPICAL
Status: DISCONTINUED | OUTPATIENT
Start: 2021-03-29 | End: 2021-03-29

## 2021-03-29 RX ORDER — DOXYCYCLINE 100 MG/10ML
100 INJECTION, POWDER, LYOPHILIZED, FOR SOLUTION INTRAVENOUS EVERY 12 HOURS
Status: DISCONTINUED | OUTPATIENT
Start: 2021-03-29 | End: 2021-04-01

## 2021-03-29 RX ORDER — PIPERACILLIN SODIUM, TAZOBACTAM SODIUM 3; .375 G/15ML; G/15ML
3.38 INJECTION, POWDER, LYOPHILIZED, FOR SOLUTION INTRAVENOUS EVERY 6 HOURS
Status: DISCONTINUED | OUTPATIENT
Start: 2021-03-29 | End: 2021-04-04

## 2021-03-29 RX ORDER — PREDNISONE 20 MG/1
40 TABLET ORAL 2 TIMES DAILY WITH MEALS
Status: DISCONTINUED | OUTPATIENT
Start: 2021-03-29 | End: 2021-04-05

## 2021-03-29 RX ADMIN — HUMAN ALBUMIN MICROSPHERES AND PERFLUTREN 9 ML: 10; .22 INJECTION, SOLUTION INTRAVENOUS at 14:30

## 2021-03-29 RX ADMIN — PIPERACILLIN SODIUM AND TAZOBACTAM SODIUM 3.38 G: 3; .375 INJECTION, POWDER, LYOPHILIZED, FOR SOLUTION INTRAVENOUS at 15:53

## 2021-03-29 RX ADMIN — METHYLPREDNISOLONE SODIUM SUCCINATE 32 MG: 40 INJECTION, POWDER, FOR SOLUTION INTRAMUSCULAR; INTRAVENOUS at 23:02

## 2021-03-29 RX ADMIN — DOXYCYCLINE 100 MG: 100 INJECTION, POWDER, LYOPHILIZED, FOR SOLUTION INTRAVENOUS at 23:02

## 2021-03-29 RX ADMIN — PIPERACILLIN SODIUM AND TAZOBACTAM SODIUM 3.38 G: 3; .375 INJECTION, POWDER, LYOPHILIZED, FOR SOLUTION INTRAVENOUS at 20:40

## 2021-03-29 RX ADMIN — LORAZEPAM 0.5 MG: 2 INJECTION INTRAMUSCULAR; INTRAVENOUS at 20:40

## 2021-03-29 RX ADMIN — ALBUTEROL SULFATE 2 PUFF: 90 AEROSOL, METERED RESPIRATORY (INHALATION) at 17:51

## 2021-03-29 RX ADMIN — ALBUTEROL SULFATE 2 PUFF: 90 AEROSOL, METERED RESPIRATORY (INHALATION) at 05:45

## 2021-03-29 RX ADMIN — HEPARIN SODIUM 950 UNITS/HR: 10000 INJECTION, SOLUTION INTRAVENOUS at 22:40

## 2021-03-29 RX ADMIN — ACETAMINOPHEN 650 MG: 650 SUPPOSITORY RECTAL at 19:53

## 2021-03-29 RX ADMIN — BUSPIRONE HYDROCHLORIDE 30 MG: 15 TABLET ORAL at 07:57

## 2021-03-29 RX ADMIN — FLUOXETINE 80 MG: 20 CAPSULE ORAL at 07:57

## 2021-03-29 RX ADMIN — AMLODIPINE BESYLATE 10 MG: 10 TABLET ORAL at 07:58

## 2021-03-29 RX ADMIN — DOXYCYCLINE 100 MG: 100 CAPSULE ORAL at 07:58

## 2021-03-29 RX ADMIN — ALBUTEROL SULFATE 2 PUFF: 90 AEROSOL, METERED RESPIRATORY (INHALATION) at 12:42

## 2021-03-29 RX ADMIN — FAMOTIDINE 20 MG: 10 INJECTION, SOLUTION INTRAVENOUS at 23:02

## 2021-03-29 RX ADMIN — LIDOCAINE HYDROCHLORIDE 10 MG: 20 INJECTION, SOLUTION INFILTRATION; PERINEURAL at 14:55

## 2021-03-29 RX ADMIN — HEPARIN SODIUM 1250 UNITS/HR: 10000 INJECTION, SOLUTION INTRAVENOUS at 14:25

## 2021-03-29 RX ADMIN — DEXAMETHASONE 2 MG: 2 TABLET ORAL at 08:00

## 2021-03-29 RX ADMIN — SULFAMETHOXAZOLE AND TRIMETHOPRIM 320 MG: 80; 16 INJECTION, SOLUTION, CONCENTRATE INTRAVENOUS at 17:43

## 2021-03-29 RX ADMIN — FAMOTIDINE 20 MG: 20 TABLET ORAL at 07:58

## 2021-03-29 RX ADMIN — CEFTRIAXONE SODIUM 2 G: 2 INJECTION, POWDER, FOR SOLUTION INTRAMUSCULAR; INTRAVENOUS at 00:45

## 2021-03-29 ASSESSMENT — ACTIVITIES OF DAILY LIVING (ADL)
ADLS_ACUITY_SCORE: 19
ADLS_ACUITY_SCORE: 18
ADLS_ACUITY_SCORE: 18
ADLS_ACUITY_SCORE: 19
ADLS_ACUITY_SCORE: 18
ADLS_ACUITY_SCORE: 18

## 2021-03-29 ASSESSMENT — MIFFLIN-ST. JEOR: SCORE: 1152.69

## 2021-03-29 NOTE — PROGRESS NOTES
Rodrigue's test performed prior to radial ABG draw. Collateral circulation confirmed.  Site: Right radial  FiO2: 15 lpm  Device- oxymiser        Michael Mtz, RT  3/29/2021

## 2021-03-29 NOTE — PLAN OF CARE
PT: Eval orders received, chart reviewed. Per chart review and discussion with medical team will hold PT this day to allow for further medical management to improve pt's ability to fully participate in therapy session

## 2021-03-29 NOTE — TELEPHONE ENCOUNTER
Received a call from daughter La Nena, who wanted Dr. Baker to be updated that Olga is currently hospitalized.    She reports that Olga is having breathing issues, including chest tightness, shortness of breath, and requiring oxygen (3 LPM).  She also has extreme fatigue and is now requiring assist of one.    La Nean reports that she had a chest CT on Friday in the ER.  She has been tested for COVID-19, negative x2.    La Nena requests guidance on when to start chemo/radiation.  (Call back number: [phone])    Routing to Dr. Baker and DANIEL Gil.    Erich Hodges, BSN, RN, OCN  Oncology Care Coordinator  [facility]

## 2021-03-29 NOTE — PROGRESS NOTES
[facility]    Medicine Progress Note - Hospitalist Service       Date of Admission:  3/26/2021  Assessment & Plan       Olga Bailey is a 75 year old female admitted on 3/26/2021.  past medical history that is most notable for recent craniotomy and resection of glioblastoma multiforme, as well as uncomplicated asthma, who presents with dyspnea and is found to have acute bilateral pneumonia.          Acute bilateral pneumonia  Presents with progressive dyspnea.  Afebrile without leukocytosis but left shift and lymphopenia noted on differential.  Admission CT showing bilateral patchy groundglass opacities, negative for PE.   COVID19 negative 3/26 and 3/29.  Trop and Pro-BNP negative.   - continue empiric ceftriaxone and doxy  - albuterol q4h while awake  - oxygen needs up to 6L this AM  - reporting exertional chest pressure; will repeat EKG, troponin, Pro-BNP and obtain TTE and repeat CXR  - remains afebrile without leukocytosis to suggest untreated PNA but will repeat procalcitonin  - incentive spriometry    Addendum:  EKG showing NSR without ischemic changes, trop and BNP negative.  LE US positive for bilateral DVT - will start heparin gtt for the next few hours with transition to lovenox this evening (to avoid waking her at 0130 for next injection; discussed with Neurosurg who is ok with full anticoagulation at this time).  TTE pending.  CXR showing increase patchy opacities though procal remains low and is afebrile.  Will check respiratory viral panel, switch to zosyn and ask ID to consult.    ADDENDUM: Discussed with ID, suspect PJP and will initiate high dose Bactrim and prednisone bid pending diagnostic work-up.  Discussed with PharmD, will hold further decadron as prednisone dosing should prevent any adrenal insufficiency.  Family concerned for saddle PE and requesting CT pulm angio to rule this out, however, is normotensive without tachycardia and negative troponin and Pro-BNP  on labs this AM.  EKG negative for any signs of ischemia/strain.  TTE shows only mild RV dilation and mildly decreased RV systolic function (no prior for comparison).  Ultimately feel risk of renal injury with further contrast exposure while starting high dose bactrim is outweighed by very low suspicion for saddle PE.      Hypokalemia  - K protocol     GBM s/p resection 2/23/21  Followed by Dr. Patterson of Neuro-Oncology.  She is in process of being set up to start chemotherapy and XRT next week.  - continue PTA Dexamethasone taper   - has not yet initiated chemo (temozolomide) or Bactrim ppx     Hypertension  - continue PTA amlodipine    Asthma  - albuterol as above    Depression and anxiety  Insomnia  - continue PTA Prozac and Buspar  - continue PTA Atarax for sleep  - prn lorazepam    GERD  - continue PTA Pepcid     Chronic anemia  Admission hgb 11.4 consistent with recent baseline.          Diet: Combination Diet Regular Diet Adult    DVT Prophylaxis: Enoxaparin (Lovenox)  Martino Catheter: not present  Code Status: Full Code           Disposition Plan   Expected discharge: 2 - 3 days, recommended to prior living arrangement once antibiotic plan established and work-up complete, weaned from oxygen.  Entered: Doug Almaguer MD 03/29/2021, 9:33 AM       The patient's care was discussed with the Bedside Nurse, Patient and Patient's Family.    Doug Almaguer MD  Hospitalist Service  [facility]  Contact information available via [facility] Paging/Directory    ______________________________________________________________________    Interval History   Feels breathing is easier today despite increase in oxygen needs.  Denies subjective fever/chills, cough.  Reports substernal chest pressure with activity, though also notes some pleuritic component.  Endorses some orthopnea, no edema.  No wheezing.  No other complaints.     Data reviewed today: I reviewed all medications, new labs and imaging results over  the last 24 hours. I personally reviewed no images or EKG's today.    Physical Exam   Vital Signs: Temp: 99.3  F (37.4  C) Temp src: Oral BP: 135/83 Pulse: 82   Resp: 20 SpO2: 90 % O2 Device: Nasal cannula Oxygen Delivery: 6 LPM  Weight: 151 lbs 12.8 oz  General Appearance: Well nourished female in NAD, lying in bed  Respiratory: mild crackles bilaterally, no wheezing or tachypnea  Cardiovascular: RRR, normal s1/s2 without murmur  GI: abdomen soft, normal bowel sounds  Skin: no peripheral edema  Other: Alert and appropriate, cranial nerves grossly intact     Data   Recent Labs   Lab 03/29/21  0820 03/28/21  0816 03/27/21  1530 03/27/21  0933 03/27/21  0933 03/26/21  1715   WBC 9.2  --   --   --  8.3 9.4   HGB 11.2*  --   --   --  12.0 11.4*   MCV 88  --   --   --  91 91     --   --   --  180 180     --   --   --  137 134   POTASSIUM 3.8 3.8 4.3   < > 3.1* 4.1   CHLORIDE 100  --   --   --  104 103   CO2 30  --   --   --  30 28   BUN 19  --   --   --  21 26   CR 0.51*  --   --   --  0.56 0.64   ANIONGAP 3  --   --   --  3 3   ESTIVEN 8.8  --   --   --  8.7 8.5   GLC 83  --   --   --  99 187*   ALBUMIN  --   --   --   --  3.0* 3.0*   PROTTOTAL  --   --   --   --  6.1* 6.3*   BILITOTAL  --   --   --   --  0.5 0.4   ALKPHOS  --   --   --   --  69 68   ALT  --   --   --   --  36 39   AST  --   --   --   --  20 28   TROPI  --   --   --   --   --  <0.015    < > = values in this interval not displayed.

## 2021-03-29 NOTE — PLAN OF CARE
DATE & TIME: 3/29/2021 [id number]  Cognitive Concerns/ Orientation : A&Ox4 Cooperative, frustration/fear.   BEHAVIOR & AGGRESSION TOOL COLOR: Green  CIWA SCORE: NA   ABNL VS/O2: VSS except on 6L NC 89-91%, JACOBSEN.  MOBILITY: SBA with GB. Slight shaking. Pt asks for assist with inhaler  PAIN MANAGMENT: Denies pain   DIET: Regular with fair appetite. Boost ordered per Pt request.  BOWEL/BLADDER: Continent, calling to use bathroom.   ABNL LAB/BG: Sent swab to retest COVID19/Influenza this AM-resulted negative.  DRAIN/DEVICES: PIV in RUE  with heparin infusing at 12.5 ml/hr until 2030 then will switch to lovenox. intermittent abx-ID following.  TELEMETRY RHYTHM: NA  SKIN: Scattered bruising  TESTS/PROCEDURES: LE US shoes DVT's.  Echo, chest x-ray, labs drawn see results.  D/C DATE: TBD. Pending improvements and weaned off O2.   OTHER IMPORTANT INFO: Infrequent dry nonproductive cough. Encouraged to use IS.

## 2021-03-29 NOTE — PLAN OF CARE
"Cognitive Concerns/ Orientation : A&Ox4   BEHAVIOR & AGGRESSION TOOL COLOR: Green  CIWA SCORE: NA   ABNL VS/O2: VSS on 2L NC. Was able to wean down to room air while up in chair. Desat in sleep to 88-89%, put back on 2L NC sats in mid 90s.  MOBILITY: SBA. Up in chair for meals. Slight shaking. Pt asks for assist with inhaler because \"I am even too weak to do those by myself\"   PAIN MANAGMENT: Denies. Some generalized discomforts from inactivity.   DIET: Regular with fair appetite. Good fluid intake.   BOWEL/BLADDER: Continent, calling to use bathroom.   ABNL LAB/BG: K 3.8, after replacement, albumin 3.0, Covid-19 negative 03/26. Plan to reswab pt d/t Covid symptoms.   DRAIN/DEVICES: PIV in RUE SL with daily abx.   TELEMETRY RHYTHM: NA  SKIN: WNL  TESTS/PROCEDURES: Retest for covid after 72 hrs (on 3/29/2021)  D/C DATE: TBD. Pending improvements and weaned off O2.   OTHER IMPORTANT INFO: Infrequent dry nonproductive coughs. Encouraged to use IS. Ambulated in room with SBA and oxygen. Able to stand and brush teeth at sink. Special precautions in place.   "

## 2021-03-29 NOTE — SIGNIFICANT EVENT
Oxymizer increased from 5 L to 12 L to maintain sats >90%. Pt reports feeling increased SOB. Labored breathing. RR 36. RRT called, rectal temp 101.6. Placed on high flow oxygen. Sat 9-91%. RR 20 now. CXR, cultures, lactic pending. Will give Tylenol.
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
## 2021-03-29 NOTE — PLAN OF CARE
"DATE & TIME: 3/29/2021 3103-3150   Cognitive Concerns/ Orientation : A&Ox4 Cooperative, frustration/fear.   BEHAVIOR & AGGRESSION TOOL COLOR: Green  CIWA SCORE: NA   ABNL VS/O2: VSS on 4L NC  MOBILITY: SBA. Up in chair for meals. Slight shaking. Pt asks for assist with inhaler because \"I am even too weak to do those by myself\"   PAIN MANAGMENT: Denies pain   DIET: Regular with fair appetite. Good fluid intake.   BOWEL/BLADDER: Continent, calling to use bathroom.   ABNL LAB/BG: Sent swab to retest COVID19/Influenza this AM    DRAIN/DEVICES: PIV in RUE SL with intermittent abx  TELEMETRY RHYTHM: NA  SKIN: Scattered bruising  TESTS/PROCEDURES: NA  D/C DATE: TBD. Pending improvements and weaned off O2.   OTHER IMPORTANT INFO: Infrequent dry nonproductive cough. Encouraged to use IS.   "

## 2021-03-29 NOTE — PROGRESS NOTES
House [facility] brief follow up note:    Received bedside handoff from colleague, KAYCE Son, CNP, house [facility].  Upon arrival, pt sitting upright in bed, awake, alert, in mild respiratory distress, on HFNC FiO2 81% with O2 sats 86-89%.  Pt's lung sounds clear throughout, no obvious crackles or wheezes noted, mildly tachypneic, RR low 20s.  Discussed with pt that she needs additional respiratory support and inquired if ok to trial Bipap therapy; pt agrees.  After placement of Bipap, 14/7/12/100%, pt's O2 sats nearly instantly 99%; while present at pt's bedside, able to transition pt to 50% FiO2 with O2 sats maintaining 95%.    Interventions:  - Will initiate continuous Bipap therapy  - After curbside discussion with intensivist, will not pursue CT PE study, as requested by family, as pt's echocardiogram from today without concerns for acute PE/saddle PE, no obvious RV strain, mildly dilated RV; however, within parameters to have low suspicion for PE.  Pt remains hemodynamically stable.  Pt's repeat trop negative, .  Pt remains on heparin infusion DVT/PE protocol.  Discussed recent lactic acid result of 2.7, suspect 2/2 increased work of breathing and hypoxia; will defer additional IVF at this time in setting of respiratory status.  - Noted COVID 19 PCR negative X2, including negative today    - Will transfer pt to [facility] for continuous Bipap therapy    - Pt remains on telemetry and continuous pulse oximetry  - Will adjust pt's PO medications to IV, including prednisone to methyprednisolone, pepcid, doxycycline  - Will have PRN hydralazine and labetalol available        - Pt continues on bactrim, zosyn and doxycycline  - Will repeat ABG after Bipap placement  - Per pt's daughter, La Nena's, request and approval of pt, will update La Nena's dad (pt's ex-, Fahad, ED physician), regarding pt's clinical status.  Explained plan of care, answered all questions as best as able.       KAYCE Carmona, DEB Brizuela  RICKY    I spent an additional 45 critical care min at pt's bedside and on unit managing and coordinating pt's overall plan of care and consulting with specialist regarding pt's clinical status.
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
## 2021-03-30 LAB
ANION GAP SERPL CALCULATED.3IONS-SCNC: 4 MMOL/L (ref 3–14)
BUN SERPL-MCNC: 19 MG/DL (ref 7–30)
C PNEUM DNA SPEC QL NAA+PROBE: NOT DETECTED
CALCIUM SERPL-MCNC: 8.6 MG/DL (ref 8.5–10.1)
CHLORIDE SERPL-SCNC: 100 MMOL/L (ref 94–109)
CO2 SERPL-SCNC: 27 MMOL/L (ref 20–32)
CREAT SERPL-MCNC: 0.55 MG/DL (ref 0.52–1.04)
ERYTHROCYTE [DISTWIDTH] IN BLOOD BY AUTOMATED COUNT: 14.6 % (ref 10–15)
FLUAV H1 2009 PAND RNA SPEC QL NAA+PROBE: NOT DETECTED
FLUAV H1 RNA SPEC QL NAA+PROBE: NOT DETECTED
FLUAV H3 RNA SPEC QL NAA+PROBE: NOT DETECTED
FLUAV RNA SPEC QL NAA+PROBE: NOT DETECTED
FLUBV RNA SPEC QL NAA+PROBE: NOT DETECTED
GFR SERPL CREATININE-BSD FRML MDRD: >90 ML/MIN/{1.73_M2}
GLUCOSE BLDC GLUCOMTR-MCNC: 94 MG/DL (ref 70–99)
GLUCOSE SERPL-MCNC: 168 MG/DL (ref 70–99)
HADV DNA SPEC QL NAA+PROBE: NOT DETECTED
HCOV PNL SPEC NAA+PROBE: NOT DETECTED
HCT VFR BLD AUTO: 32.5 % (ref 35–47)
HGB BLD-MCNC: 10.7 G/DL (ref 11.7–15.7)
HMPV RNA SPEC QL NAA+PROBE: NOT DETECTED
HPIV1 RNA SPEC QL NAA+PROBE: NOT DETECTED
HPIV2 RNA SPEC QL NAA+PROBE: NOT DETECTED
HPIV3 RNA SPEC QL NAA+PROBE: NOT DETECTED
HPIV4 RNA SPEC QL NAA+PROBE: NOT DETECTED
L PNEUMO1 AG UR QL IA: NORMAL
M PNEUMO DNA SPEC QL NAA+PROBE: NOT DETECTED
MCH RBC QN AUTO: 29.2 PG (ref 26.5–33)
MCHC RBC AUTO-ENTMCNC: 32.9 G/DL (ref 31.5–36.5)
MCV RBC AUTO: 89 FL (ref 78–100)
MICROBIOLOGIST REVIEW: NORMAL
PLATELET # BLD AUTO: 157 10E9/L (ref 150–450)
POTASSIUM SERPL-SCNC: 4.3 MMOL/L (ref 3.4–5.3)
RBC # BLD AUTO: 3.66 10E12/L (ref 3.8–5.2)
RSV RNA SPEC QL NAA+PROBE: NOT DETECTED
RSV RNA SPEC QL NAA+PROBE: NOT DETECTED
RV+EV RNA SPEC QL NAA+PROBE: NOT DETECTED
SODIUM SERPL-SCNC: 131 MMOL/L (ref 133–144)
SPECIMEN SOURCE: NORMAL
UFH PPP CHRO-ACNC: 0.4 IU/ML
UFH PPP CHRO-ACNC: 0.42 IU/ML
UFH PPP CHRO-ACNC: 0.53 IU/ML
WBC # BLD AUTO: 9.8 10E9/L (ref 4–11)

## 2021-03-30 PROCEDURE — 250N000011 HC RX IP 250 OP 636: Performed by: NURSE PRACTITIONER

## 2021-03-30 PROCEDURE — 120N000013 HC R&B IMCU

## 2021-03-30 PROCEDURE — 250N000013 HC RX MED GY IP 250 OP 250 PS 637: Performed by: HOSPITALIST

## 2021-03-30 PROCEDURE — 94640 AIRWAY INHALATION TREATMENT: CPT | Mod: 76

## 2021-03-30 PROCEDURE — 120N000001 HC R&B MED SURG/OB

## 2021-03-30 PROCEDURE — 999N001017 HC STATISTIC GLUCOSE BY METER IP

## 2021-03-30 PROCEDURE — 999N000157 HC STATISTIC RCP TIME EA 10 MIN

## 2021-03-30 PROCEDURE — 85027 COMPLETE CBC AUTOMATED: CPT | Performed by: HOSPITALIST

## 2021-03-30 PROCEDURE — 250N000011 HC RX IP 250 OP 636: Performed by: HOSPITALIST

## 2021-03-30 PROCEDURE — 85520 HEPARIN ASSAY: CPT | Performed by: HOSPITALIST

## 2021-03-30 PROCEDURE — 87899 AGENT NOS ASSAY W/OPTIC: CPT | Performed by: HOSPITALIST

## 2021-03-30 PROCEDURE — 36415 COLL VENOUS BLD VENIPUNCTURE: CPT | Performed by: HOSPITALIST

## 2021-03-30 PROCEDURE — 94660 CPAP INITIATION&MGMT: CPT

## 2021-03-30 PROCEDURE — 250N000009 HC RX 250: Performed by: NURSE PRACTITIONER

## 2021-03-30 PROCEDURE — 258N000003 HC RX IP 258 OP 636: Performed by: HOSPITALIST

## 2021-03-30 PROCEDURE — 250N000009 HC RX 250: Performed by: HOSPITALIST

## 2021-03-30 PROCEDURE — 99233 SBSQ HOSP IP/OBS HIGH 50: CPT | Performed by: HOSPITALIST

## 2021-03-30 PROCEDURE — 94640 AIRWAY INHALATION TREATMENT: CPT

## 2021-03-30 PROCEDURE — 80048 BASIC METABOLIC PNL TOTAL CA: CPT | Performed by: HOSPITALIST

## 2021-03-30 RX ORDER — ALBUTEROL SULFATE 90 UG/1
2 AEROSOL, METERED RESPIRATORY (INHALATION) EVERY 4 HOURS PRN
Status: DISCONTINUED | OUTPATIENT
Start: 2021-03-30 | End: 2021-04-26 | Stop reason: HOSPADM

## 2021-03-30 RX ORDER — ALBUTEROL SULFATE 0.83 MG/ML
2.5 SOLUTION RESPIRATORY (INHALATION)
Status: DISCONTINUED | OUTPATIENT
Start: 2021-03-30 | End: 2021-04-09

## 2021-03-30 RX ADMIN — PIPERACILLIN SODIUM AND TAZOBACTAM SODIUM 3.38 G: 3; .375 INJECTION, POWDER, LYOPHILIZED, FOR SOLUTION INTRAVENOUS at 08:26

## 2021-03-30 RX ADMIN — METHYLPREDNISOLONE SODIUM SUCCINATE 32 MG: 40 INJECTION, POWDER, FOR SOLUTION INTRAMUSCULAR; INTRAVENOUS at 10:29

## 2021-03-30 RX ADMIN — DOXYCYCLINE 100 MG: 100 INJECTION, POWDER, LYOPHILIZED, FOR SOLUTION INTRAVENOUS at 10:29

## 2021-03-30 RX ADMIN — PIPERACILLIN SODIUM AND TAZOBACTAM SODIUM 3.38 G: 3; .375 INJECTION, POWDER, LYOPHILIZED, FOR SOLUTION INTRAVENOUS at 20:17

## 2021-03-30 RX ADMIN — SULFAMETHOXAZOLE AND TRIMETHOPRIM 320 MG: 80; 16 INJECTION, SOLUTION, CONCENTRATE INTRAVENOUS at 02:01

## 2021-03-30 RX ADMIN — BUSPIRONE HYDROCHLORIDE 30 MG: 15 TABLET ORAL at 20:17

## 2021-03-30 RX ADMIN — PIPERACILLIN SODIUM AND TAZOBACTAM SODIUM 3.38 G: 3; .375 INJECTION, POWDER, LYOPHILIZED, FOR SOLUTION INTRAVENOUS at 14:45

## 2021-03-30 RX ADMIN — HYDROXYZINE HYDROCHLORIDE 50 MG: 25 TABLET, FILM COATED ORAL at 23:19

## 2021-03-30 RX ADMIN — FAMOTIDINE 20 MG: 10 INJECTION, SOLUTION INTRAVENOUS at 10:29

## 2021-03-30 RX ADMIN — ALBUTEROL SULFATE 2.5 MG: 2.5 SOLUTION RESPIRATORY (INHALATION) at 23:45

## 2021-03-30 RX ADMIN — ALBUTEROL SULFATE 2.5 MG: 2.5 SOLUTION RESPIRATORY (INHALATION) at 14:28

## 2021-03-30 RX ADMIN — DOXYCYCLINE 100 MG: 100 INJECTION, POWDER, LYOPHILIZED, FOR SOLUTION INTRAVENOUS at 22:21

## 2021-03-30 RX ADMIN — PIPERACILLIN SODIUM AND TAZOBACTAM SODIUM 3.38 G: 3; .375 INJECTION, POWDER, LYOPHILIZED, FOR SOLUTION INTRAVENOUS at 01:35

## 2021-03-30 RX ADMIN — METHYLPREDNISOLONE SODIUM SUCCINATE 32 MG: 40 INJECTION, POWDER, FOR SOLUTION INTRAMUSCULAR; INTRAVENOUS at 22:21

## 2021-03-30 RX ADMIN — SULFAMETHOXAZOLE AND TRIMETHOPRIM 320 MG: 80; 16 INJECTION, SOLUTION, CONCENTRATE INTRAVENOUS at 08:33

## 2021-03-30 RX ADMIN — FAMOTIDINE 20 MG: 10 INJECTION, SOLUTION INTRAVENOUS at 22:32

## 2021-03-30 RX ADMIN — SULFAMETHOXAZOLE AND TRIMETHOPRIM 320 MG: 80; 16 INJECTION, SOLUTION, CONCENTRATE INTRAVENOUS at 16:38

## 2021-03-30 RX ADMIN — ALBUTEROL SULFATE 2.5 MG: 2.5 SOLUTION RESPIRATORY (INHALATION) at 20:04

## 2021-03-30 ASSESSMENT — ACTIVITIES OF DAILY LIVING (ADL)
ADLS_ACUITY_SCORE: 18

## 2021-03-30 ASSESSMENT — MIFFLIN-ST. JEOR: SCORE: 1151.33

## 2021-03-30 NOTE — PROVIDER NOTIFICATION
MD Notification    Notified Person: MD    Notified Person Name: Tripp    Notification Date/Time: 03/30/21 5:17 PM    Notification Interaction: online paging    Purpose of Notification: Pt having nose bleeds while on heparin gtt. Also pt off Bipap on high flow NC    Orders Received: Stop heparin for 30 minutes, recheck heparin 10A.     Comments:

## 2021-03-30 NOTE — PROGRESS NOTES
[facility]    Infectious Disease Progress Note    Date of Service (when I saw the patient): 03/30/2021     Assessment & Plan   Olga Bailey is a 75 year old female who was admitted on 3/26/2021.     Impression:  1. 75 y.o with recent diagnosis of craniotomy and resection of glioblastoma multiforme.   2. Has been on very high dose steroid taper for the past month since the craniotomy. Not on any bactrim prophylaxis yet.   3. Admitted with shortness of breath.   4. Found to have bilateral ground glass infiltrates on the imaging, very hypoxic.   5. COVID PCR negative x 2.   6. No leucocytosis, procal not elevated.      Recommendations:   High suspicion for PJP pneumonia. LDH high    Ok with zosyn and doxy but add bactrim treatment dose for PJP as we wait on sputum cultures.   Steroids for hypoxia.   Get sputum for regular bacterial culture, PJP  Elevated LDH, galactomannan and fungitell are pending.          Robyn Branham MD    Interval History   On bipap  Fever of 101.6       Physical Exam   Temp: 98.6  F (37  C) Temp src: Axillary BP: 132/77 Pulse: 71   Resp: 28 SpO2: 93 % O2 Device: BiPAP/CPAP Oxygen Delivery: 12 LPM  Vitals:    03/26/21 1701 03/29/21 0730 03/30/21 0632   Weight: 69.9 kg (154 lb) 68.9 kg (151 lb 12.8 oz) 68.7 kg (151 lb 8 oz)     Vital Signs with Ranges  Temp:  [98.2  F (36.8  C)-101.6  F (38.7  C)] 98.6  F (37  C)  Pulse:  [67-88] 71  Resp:  [20-37] 28  BP: (123-139)/() 132/77  SpO2:  [87 %-97 %] 93 %    Constitutional: Awake, alert, cooperative, no apparent distress  Lungs: diminished bilateral   Cardiovascular: Regular rate and rhythm, normal S1 and S2, and no murmur noted  Abdomen: Normal bowel sounds, soft, non-distended, non-tender  Skin: No rashes, no cyanosis, no edema  Other:    Medications     heparin 950 Units/hr (03/30/21 0730)     - MEDICATION INSTRUCTIONS -         albuterol  2 puff Inhalation Q4H While awake     amLODIPine  10 mg Oral Daily      busPIRone HCl  30 mg Oral BID     doxycycline (VIBRAMYCIN) IV  100 mg Intravenous Q12H     [Held by provider] doxycycline hyclate  100 mg Oral Q12H ELGIN     famotidine  20 mg Intravenous Q12H     [Held by provider] famotidine  20 mg Oral BID     FLUoxetine  80 mg Oral Daily     methylPREDNISolone  32 mg Intravenous Q12H     piperacillin-tazobactam  3.375 g Intravenous Q6H     [Held by provider] predniSONE  40 mg Oral BID w/meals     sodium chloride (PF)  3 mL Intracatheter Q8H     sulfamethoxazole-trimethoprim  320 mg Intravenous Q8H       Data   All microbiology laboratory data reviewed.  Recent Labs   Lab Test 03/30/21  0502 03/29/21  1411 03/29/21  0820   WBC 9.8 9.2 9.2   HGB 10.7* 11.4* 11.2*   HCT 32.5* 34.5* 34.2*   MCV 89 88 88    180 171     Recent Labs   Lab Test 03/30/21  0502 03/29/21  1947 03/29/21  0820   CR 0.55 0.57 0.51*     No lab results found.  Recent Labs   Lab Test 03/29/21  1951 03/29/21  1946   CULT No growth after 7 hours No growth after 7 hours       Attestation:  Total time on the floor involved in the patient's care: 35 minutes. Total time spent in counseling/care coordination: >50%

## 2021-03-30 NOTE — PLAN OF CARE
Arrived to floor around 2200 via cart. A/O, VSS, satting mid 90's on BiPAP 12/7 @ 50%, RR 20's.  Denies pain, states she feels less anxious. LS dim.  Hep gtt restarted at 2300, 950 units/hr.

## 2021-03-30 NOTE — PLAN OF CARE
PT/OT: Patient remains on continuous Bipap and not appropriate for therapies today per conversation with nurse; plans to try to wean off Bipap some time today.

## 2021-03-30 NOTE — PLAN OF CARE
Clarified with pharmacy that IV Heparin must be stopped for 60 minutes. Stopped at 2110, needs to restart at 2210 at decreased rate. See MAR.

## 2021-03-30 NOTE — PROGRESS NOTES
[facility]    Medicine Progress Note - Hospitalist Service       Date of Admission:  3/26/2021  Assessment & Plan       Olga Bailey is a 75 year old female admitted on 3/26/2021.  past medical history that is most notable for recent craniotomy and resection of glioblastoma multiforme, as well as uncomplicated asthma, who presents with dyspnea and is found to have acute bilateral pneumonia.          Acute hypoxic respiratory failure due to bilateral pneumonia, suspected PJP  * Presents with progressive dyspnea.  Afebrile without leukocytosis but left shift and lymphopenia noted on differential.  Admission CT showing bilateral patchy groundglass opacities, negative for PE.  Initiated on ceftriaxone and doxy.   * COVID19 negative 3/26 and 3/29.    * Procal low 3/28 and 3/29  * Increased oxygen needs 3/29 (ultimately requiring Bipap) with repeat CXR showing increase in bilateral patchy opacities  * Repeat COVID, procal, trop, BNP negative, resp viral panel negative, EKG unremarkable on 3/29  * TTE 3/29 with EF 50-55% without WMA, mildly decreased RV systolic function with mild dilation.  * Found to have DVT 3/29 but with normal hemodynamics, normal trop and BNP, no significant RV strain on echo and risk for renal injury with contrast and initiation of high dose Bactrim, did not pursue CT imaging given very low concern for saddle embolus with need for thrombectomy.  * ID consulted 3/29, suspect PJP with prolonged steroid course following neurosurgery (see below).  * Switched from ceftriaxone to zosyn 3/29 and initiated on high dose Bactrim and increased steroids     - continue zosyn, doxy and high dose Bactrim  - continue methylpred 32 mg IV bid (converted from prednisone 40 mg bid)  - albuterol q4h while awake  - stable on Bipap, will trial HFNC today  - febrile last evening, blood cultures ngtd  - LDH elevated, Fungitell pending  - aspergillus Ag pending  - Legionella Ag pending  -  discussed with house NP; she experienced rapid and dramatic improvement in oxygenation following initiation of Bipap raising question of component atelectasis; encourage frequent IS and flutter valve if able to wean  - ID recs much appreciated    Bilateral LE DVT  US 3/29 showing occlusive DVT of bilateral posterior tibial veins and soleal veins to mid calf.  - continue heparin gtt; ultimately transition to lovenox    Hypokalemia  - K protocol     GBM s/p resection 2/23/21  Followed by Dr. Baker of Neuro-Oncology.  She is in process of being set up to start chemotherapy and XRT next week.   Appears to have been on prolonged steroids following neurosurgery as was discharged without plan for taper and initiated taper only recently in follow up with Dr. Baker.    - was down to decadron 2mg daily for taper; will discontinue decadron with initiation of high dose steroid for PJP as above   - has not yet initiated chemo (temozolomide) or Bactrim ppx     Hypertension  - continue PTA amlodipine    Asthma  - albuterol as above    Depression and anxiety  Insomnia  - continue PTA Prozac and Buspar  - continue PTA Atarax for sleep  - prn lorazepam    GERD  - continue PTA Pepcid     Chronic anemia  Admission hgb 11.4 consistent with recent baseline.          Diet: Snacks/Supplements Adult: Boost Shake; Between Meals  NPO for Medical/Clinical Reasons Except for: No Exceptions    DVT Prophylaxis: heparin gtt  Martino Catheter: not present  Code Status: Full Code           Disposition Plan   Expected discharge: > 7 days, recommended to probable TCU once resp failure improved.  Entered: Doug Almaguer MD 03/30/2021, 8:08 AM       The patient's care was discussed with the Bedside Nurse, Patient and Patient's Family.    Doug Almaguer MD  Hospitalist Service  [facility]  Contact information available via [facility] Paging/Directory    ______________________________________________________________________    Interval  History   She reports her breathing is easier this morning on Bipap, though slept very poorly overnight.  Chest pressure somewhat improved today.  Does not feel that she is wearing out.  Denies subjective fever/chills or sweats.  No cough.  No headache, myalgias, arthralgias, changes in smell/taste.  Feels very dry on Bipap with some throat irritation.  Denies lower extremity pain or swelling.     Data reviewed today: I reviewed all medications, new labs and imaging results over the last 24 hours. I personally reviewed no images or EKG's today.    Physical Exam   Vital Signs: Temp: 98.6  F (37  C) Temp src: Axillary BP: 127/73 Pulse: 72   Resp: 21 SpO2: 93 % O2 Device: BiPAP/CPAP Oxygen Delivery: 12 LPM  Weight: 151 lbs 8 oz     General Appearance: Well nourished female in NAD, lying in bed  Respiratory: few posterior crackles but quite clear overall, no wheezing, no tachypnea on Bipap  Cardiovascular: RRR, normal s1/s2 without murmur  GI: abdomen soft, normal bowel sounds, nontender, nondistended  Skin: no peripheral edema  Other: Alert and appropriate, cranial nerves grossly intact     Data   Recent Labs   Lab 03/30/21  0502 03/29/21  1947 03/29/21  1411 03/29/21  0820 03/27/21  0933 03/27/21  0933 03/26/21  1715   WBC 9.8  --  9.2 9.2  --  8.3 9.4   HGB 10.7*  --  11.4* 11.2*  --  12.0 11.4*   MCV 89  --  88 88  --  91 91     --  180 171  --  180 180   * 131*  --  133  --  137 134   POTASSIUM 4.3 3.7  --  3.8   < > 3.1* 4.1   CHLORIDE 100 99  --  100  --  104 103   CO2 27 29  --  30  --  30 28   BUN 19 21  --  19  --  21 26   CR 0.55 0.57  --  0.51*  --  0.56 0.64   ANIONGAP 4 3  --  3  --  3 3   ESTIVEN 8.6 8.8  --  8.8  --  8.7 8.5   * 156*  --  83  --  99 187*   ALBUMIN  --  2.8*  --   --   --  3.0* 3.0*   PROTTOTAL  --  6.7*  --   --   --  6.1* 6.3*   BILITOTAL  --  0.3  --   --   --  0.5 0.4   ALKPHOS  --  74  --   --   --  69 68   ALT  --  36  --   --   --  36 39   AST  --  26  --   --   --   20 28   TROPI  --  <0.015  --  <0.015  --   --  <0.015    < > = values in this interval not displayed.

## 2021-03-30 NOTE — PLAN OF CARE
A&O x 4, AVSS SR, continue on BIPAP. Tolerating well. Lungs diminished. Denied pain.up with 1 assist. Generalized weakness. Voiding, had a BM.  Heparin infusing at 950 units/hr. XA next 12 pm.

## 2021-03-31 ENCOUNTER — APPOINTMENT (OUTPATIENT)
Dept: GENERAL RADIOLOGY | Facility: CLINIC | Age: 76
DRG: 166 | End: 2021-03-31
Attending: INTERNAL MEDICINE
Payer: MEDICARE

## 2021-03-31 ENCOUNTER — TELEPHONE (OUTPATIENT)
Dept: ONCOLOGY | Facility: CLINIC | Age: 76
End: 2021-03-31

## 2021-03-31 LAB
1,3 BETA GLUCAN SER-MCNC: >500 PG/ML
ANION GAP SERPL CALCULATED.3IONS-SCNC: 3 MMOL/L (ref 3–14)
B-D GLUCAN INTERPRETATION (1,3): POSITIVE
BASE EXCESS BLDA CALC-SCNC: 2.4 MMOL/L
BUN SERPL-MCNC: 18 MG/DL (ref 7–30)
CALCIUM SERPL-MCNC: 8.7 MG/DL (ref 8.5–10.1)
CHLORIDE SERPL-SCNC: 105 MMOL/L (ref 94–109)
CO2 SERPL-SCNC: 28 MMOL/L (ref 20–32)
CREAT SERPL-MCNC: 0.64 MG/DL (ref 0.52–1.04)
ERYTHROCYTE [DISTWIDTH] IN BLOOD BY AUTOMATED COUNT: 14.6 % (ref 10–15)
GALACTOMANNAN AG SERPL QL IA: NEGATIVE
GALACTOMANNAN AG SERPL-ACNC: 0.15
GFR SERPL CREATININE-BSD FRML MDRD: 87 ML/MIN/{1.73_M2}
GLUCOSE SERPL-MCNC: 141 MG/DL (ref 70–99)
HCO3 BLD-SCNC: 25 MMOL/L (ref 21–28)
HCT VFR BLD AUTO: 30.3 % (ref 35–47)
HGB BLD-MCNC: 10.1 G/DL (ref 11.7–15.7)
INTERPRETATION ECG - MUSE: NORMAL
INTERPRETATION ECG - MUSE: NORMAL
MCH RBC QN AUTO: 29.1 PG (ref 26.5–33)
MCHC RBC AUTO-ENTMCNC: 33.3 G/DL (ref 31.5–36.5)
MCV RBC AUTO: 87 FL (ref 78–100)
O2/TOTAL GAS SETTING VFR VENT: 60 %
OXYHGB MFR BLD: 91 % (ref 92–100)
PCO2 BLD: 34 MM HG (ref 35–45)
PH BLD: 7.49 PH (ref 7.35–7.45)
PLATELET # BLD AUTO: 171 10E9/L (ref 150–450)
PO2 BLD: 61 MM HG (ref 80–105)
POTASSIUM SERPL-SCNC: 4.2 MMOL/L (ref 3.4–5.3)
RBC # BLD AUTO: 3.47 10E12/L (ref 3.8–5.2)
SODIUM SERPL-SCNC: 136 MMOL/L (ref 133–144)
UFH PPP CHRO-ACNC: 0.62 IU/ML
WBC # BLD AUTO: 10.5 10E9/L (ref 4–11)

## 2021-03-31 PROCEDURE — 85027 COMPLETE CBC AUTOMATED: CPT | Performed by: HOSPITALIST

## 2021-03-31 PROCEDURE — 250N000009 HC RX 250: Performed by: NURSE PRACTITIONER

## 2021-03-31 PROCEDURE — 94640 AIRWAY INHALATION TREATMENT: CPT

## 2021-03-31 PROCEDURE — 94640 AIRWAY INHALATION TREATMENT: CPT | Mod: 76

## 2021-03-31 PROCEDURE — 999N000157 HC STATISTIC RCP TIME EA 10 MIN

## 2021-03-31 PROCEDURE — 120N000001 HC R&B MED SURG/OB

## 2021-03-31 PROCEDURE — 250N000011 HC RX IP 250 OP 636: Performed by: HOSPITALIST

## 2021-03-31 PROCEDURE — 82805 BLOOD GASES W/O2 SATURATION: CPT | Performed by: INTERNAL MEDICINE

## 2021-03-31 PROCEDURE — 80048 BASIC METABOLIC PNL TOTAL CA: CPT | Performed by: HOSPITALIST

## 2021-03-31 PROCEDURE — 250N000013 HC RX MED GY IP 250 OP 250 PS 637: Performed by: HOSPITALIST

## 2021-03-31 PROCEDURE — 99233 SBSQ HOSP IP/OBS HIGH 50: CPT | Performed by: INTERNAL MEDICINE

## 2021-03-31 PROCEDURE — 250N000011 HC RX IP 250 OP 636: Performed by: NURSE PRACTITIONER

## 2021-03-31 PROCEDURE — 71045 X-RAY EXAM CHEST 1 VIEW: CPT

## 2021-03-31 PROCEDURE — 250N000009 HC RX 250: Performed by: HOSPITALIST

## 2021-03-31 PROCEDURE — 120N000013 HC R&B IMCU

## 2021-03-31 PROCEDURE — 85520 HEPARIN ASSAY: CPT | Performed by: HOSPITALIST

## 2021-03-31 PROCEDURE — 36415 COLL VENOUS BLD VENIPUNCTURE: CPT | Performed by: HOSPITALIST

## 2021-03-31 PROCEDURE — 258N000003 HC RX IP 258 OP 636: Performed by: HOSPITALIST

## 2021-03-31 PROCEDURE — 94660 CPAP INITIATION&MGMT: CPT

## 2021-03-31 RX ADMIN — DOXYCYCLINE 100 MG: 100 INJECTION, POWDER, LYOPHILIZED, FOR SOLUTION INTRAVENOUS at 12:00

## 2021-03-31 RX ADMIN — PIPERACILLIN SODIUM AND TAZOBACTAM SODIUM 3.38 G: 3; .375 INJECTION, POWDER, LYOPHILIZED, FOR SOLUTION INTRAVENOUS at 15:06

## 2021-03-31 RX ADMIN — METHYLPREDNISOLONE SODIUM SUCCINATE 32 MG: 40 INJECTION, POWDER, FOR SOLUTION INTRAMUSCULAR; INTRAVENOUS at 21:37

## 2021-03-31 RX ADMIN — ALBUTEROL SULFATE 2.5 MG: 2.5 SOLUTION RESPIRATORY (INHALATION) at 14:46

## 2021-03-31 RX ADMIN — METHYLPREDNISOLONE SODIUM SUCCINATE 32 MG: 40 INJECTION, POWDER, FOR SOLUTION INTRAMUSCULAR; INTRAVENOUS at 12:00

## 2021-03-31 RX ADMIN — FAMOTIDINE 20 MG: 10 INJECTION, SOLUTION INTRAVENOUS at 12:00

## 2021-03-31 RX ADMIN — SULFAMETHOXAZOLE AND TRIMETHOPRIM 320 MG: 80; 16 INJECTION, SOLUTION, CONCENTRATE INTRAVENOUS at 00:37

## 2021-03-31 RX ADMIN — PIPERACILLIN SODIUM AND TAZOBACTAM SODIUM 3.38 G: 3; .375 INJECTION, POWDER, LYOPHILIZED, FOR SOLUTION INTRAVENOUS at 02:27

## 2021-03-31 RX ADMIN — FAMOTIDINE 20 MG: 10 INJECTION, SOLUTION INTRAVENOUS at 21:21

## 2021-03-31 RX ADMIN — PIPERACILLIN SODIUM AND TAZOBACTAM SODIUM 3.38 G: 3; .375 INJECTION, POWDER, LYOPHILIZED, FOR SOLUTION INTRAVENOUS at 20:15

## 2021-03-31 RX ADMIN — SULFAMETHOXAZOLE AND TRIMETHOPRIM 320 MG: 80; 16 INJECTION, SOLUTION, CONCENTRATE INTRAVENOUS at 17:42

## 2021-03-31 RX ADMIN — SULFAMETHOXAZOLE AND TRIMETHOPRIM 320 MG: 80; 16 INJECTION, SOLUTION, CONCENTRATE INTRAVENOUS at 09:20

## 2021-03-31 RX ADMIN — ALBUTEROL SULFATE 2.5 MG: 2.5 SOLUTION RESPIRATORY (INHALATION) at 07:45

## 2021-03-31 RX ADMIN — LORAZEPAM 0.5 MG: 2 INJECTION INTRAMUSCULAR; INTRAVENOUS at 05:57

## 2021-03-31 RX ADMIN — ALBUTEROL SULFATE 2.5 MG: 2.5 SOLUTION RESPIRATORY (INHALATION) at 12:03

## 2021-03-31 RX ADMIN — BUSPIRONE HYDROCHLORIDE 30 MG: 15 TABLET ORAL at 20:13

## 2021-03-31 RX ADMIN — ALBUTEROL SULFATE 2.5 MG: 2.5 SOLUTION RESPIRATORY (INHALATION) at 20:33

## 2021-03-31 RX ADMIN — PIPERACILLIN SODIUM AND TAZOBACTAM SODIUM 3.38 G: 3; .375 INJECTION, POWDER, LYOPHILIZED, FOR SOLUTION INTRAVENOUS at 08:00

## 2021-03-31 RX ADMIN — HEPARIN SODIUM 950 UNITS/HR: 10000 INJECTION, SOLUTION INTRAVENOUS at 00:36

## 2021-03-31 ASSESSMENT — ACTIVITIES OF DAILY LIVING (ADL)
ADLS_ACUITY_SCORE: 18

## 2021-03-31 ASSESSMENT — MIFFLIN-ST. JEOR: SCORE: 1147.25

## 2021-03-31 NOTE — PROGRESS NOTES
Rodrigue's test performed prior to radial ABG draw. Collateral circulation confirmed.  Site:Right radial  Device:BiPAP 14/7 60%.    Recent Labs   Lab 03/31/21  1440 03/29/21  2235 03/29/21  1825   PH 7.49* 7.51* 7.51*   PCO2 34* 35 33*   PO2 61* 67* 57*   HCO3 25 28 26   O2PER 60 50  --       at bedside. Settings decreased to BiPAP 10/5 based on ABG results.  Will cont to monitor.  3/31/2021  Funmilayo Ying, RT

## 2021-03-31 NOTE — PLAN OF CARE
PT: Order received; Per chart review, patient needing to transition from hiflow O2 back to Bipap this am; confirmed by nurse; Patient not appropriate for evaluation this date.

## 2021-03-31 NOTE — PROGRESS NOTES
"SPIRITUAL HEALTH SERVICES Progress Note  [facility]    Visit with pt, per her lengthy hospital stay.  Read past spiritual health notes in chart, from pt's prolonged hospitalization(s) in the last few months.  Pt was alert and oriented, breathing with bi-pap mask, which made conversation challenging.    Pt affirmed hopes for her recovery, despite the present struggles.  She declined my offer of a visit from our , saying, \"I hope it doesn't come to that!    Visit ended when other staff arrived to provide care to pt.  I and other chaplains will continue to be available per need or request for support.                                                                                                                                                 Faviola Elizabeth M.A.  Staff   Phone [phone]      "

## 2021-03-31 NOTE — PROGRESS NOTES
Moved Pt from BiPAP to High flow nasal cannula. Pt did not tolerate it well. Respiratory rate increased and SpO2 dropped. Pt was then placed back on BiPAP with settings of 14/7 FiO2 of 70. Will continue to follow up.  3/31/2021  Ewa Lamb

## 2021-03-31 NOTE — TELEPHONE ENCOUNTER
Writer returned La Nena's call 3/29 to confirm team is aware Olga is currently inpatient. Dr. Baker and Dr. Almaguer in communication regarding plan of care. Writer will follow-up with [facility] once discharge plan is in place.     Writer updated [facility] that patient is currently in hospital. Chemo/RT start date pending discharge. Will follow-up as needed.     JIAN EsquivelN, RN, PHN, OCN  Oncology Care Coordinator  [facility]

## 2021-03-31 NOTE — PLAN OF CARE
A&O x 4, AVSS, pt tolerated high flow oxygen overnight until about 5am when she started to experience SOB, got out of to be weighed and O2 dropped into the 70's. RT was called and she was placed back on BiPap- tolerating well with sats at 99%.lungs diminished. Ativan given for anxiety. Up with 1 assist. Voiding without difficulty. Denied pain.  Pt reported to her daughter that the night before she was seeing some men in the her that was not actually. Hallucinating.   No report of that tonight.

## 2021-03-31 NOTE — PLAN OF CARE
[facility]C status. A&Ox4. VSS on high flow NC 40L fiO2 90%. IV infusing heparin at 950 units/hr, recheck completed, therapeutic range. Assist x1/ Tele NSR. Advanced to regular diet once off Bipap for 2 hours. Lung sounds diminished. Good appetite. Voiding in bedside commode.

## 2021-03-31 NOTE — PROGRESS NOTES
[facility]    Infectious Disease Progress Note    Date of Service (when I saw the patient): 03/31/2021     Assessment & Plan   Olga Bailey is a 75 year old female who was admitted on 3/26/2021.     Impression:  1. 75 y.o with recent diagnosis of craniotomy and resection of glioblastoma multiforme.   2. Has been on very high dose steroid taper for the past month since the craniotomy. Not on any bactrim prophylaxis yet.   3. Admitted with shortness of breath.   4. Found to have bilateral ground glass infiltrates on the imaging, very hypoxic.   5. COVID PCR negative x 2.   6. No leucocytosis, procal not elevated.      Recommendations:   High suspicion for PJP pneumonia. LDH high   Ok with zosyn and doxy but added bactrim treatment dose for PJP as we wait on sputum cultures. Stop doxy after today 5 days of atypical organism coverage, legionella antigen is negative.   Steroids for hypoxia.   Get sputum for regular bacterial culture, PJP if possible ( labs have been ordered)   Elevated LDH, galactomannan and fungitell are pending.          Robyn Branham MD    Interval History   On bipap  No fever   No new micro   No new complaints       Physical Exam   Temp: 98.6  F (37  C) Temp src: Oral BP: 122/71 Pulse: 75   Resp: 30 SpO2: 97 % O2 Device: BiPAP/CPAP Oxygen Delivery: 40 LPM  Vitals:    03/29/21 0730 03/30/21 0632 03/31/21 0600   Weight: 68.9 kg (151 lb 12.8 oz) 68.7 kg (151 lb 8 oz) 68.3 kg (150 lb 9.6 oz)     Vital Signs with Ranges  Temp:  [98.5  F (36.9  C)-99.1  F (37.3  C)] 98.6  F (37  C)  Pulse:  [70-89] 75  Resp:  [15-32] 30  BP: (117-133)/() 122/71  FiO2 (%):  [70 %-93 %] 70 %  SpO2:  [89 %-99 %] 97 %    Constitutional: Awake, alert, cooperative, no apparent distress  Lungs: diminished bilateral   Cardiovascular: Regular rate and rhythm, normal S1 and S2, and no murmur noted  Abdomen: Normal bowel sounds, soft, non-distended, non-tender  Skin: No rashes, no cyanosis, no  edema  Other:    Medications     heparin 950 Units/hr (03/31/21 0036)     - MEDICATION INSTRUCTIONS -         albuterol  2.5 mg Nebulization Q4H While awake     amLODIPine  10 mg Oral Daily     busPIRone HCl  30 mg Oral BID     doxycycline (VIBRAMYCIN) IV  100 mg Intravenous Q12H     [Held by provider] doxycycline hyclate  100 mg Oral Q12H ELGIN     famotidine  20 mg Intravenous Q12H     [Held by provider] famotidine  20 mg Oral BID     FLUoxetine  80 mg Oral Daily     methylPREDNISolone  32 mg Intravenous Q12H     piperacillin-tazobactam  3.375 g Intravenous Q6H     [Held by provider] predniSONE  40 mg Oral BID w/meals     sodium chloride (PF)  3 mL Intracatheter Q8H     sulfamethoxazole-trimethoprim  320 mg Intravenous Q8H       Data   All microbiology laboratory data reviewed.  Recent Labs   Lab Test 03/31/21  0514 03/30/21  0502 03/29/21  1411   WBC 10.5 9.8 9.2   HGB 10.1* 10.7* 11.4*   HCT 30.3* 32.5* 34.5*   MCV 87 89 88    157 180     Recent Labs   Lab Test 03/31/21  0514 03/30/21  0502 03/29/21  1947   CR 0.64 0.55 0.57     No lab results found.  Recent Labs   Lab Test 03/29/21 1951 03/29/21  1946   CULT No growth after 2 days No growth after 2 days       Attestation:  Total time on the floor involved in the patient's care: 35 minutes. Total time spent in counseling/care coordination: >50%

## 2021-03-31 NOTE — PROGRESS NOTES
[facility]  Hospitalist Progress Note  Doug Bob MD  03/31/2021    Assessment & Plan   Olga Bailey is a 75 year old female admitted on 3/26/2021.  past medical history that is most notable for recent craniotomy and resection of glioblastoma multiforme, as well as uncomplicated asthma, who presents with dyspnea and is found to have acute bilateral pneumonia.           Acute hypoxic respiratory failure due to bilateral pneumonia, suspected PJP  * Presents with progressive dyspnea.  Afebrile without leukocytosis but left shift and lymphopenia noted on differential.  Admission CT showing bilateral patchy groundglass opacities, negative for PE.  Initially treated with ceftriaxone and doxy.   * COVID19 negative 3/26 and 3/29.    * Procal low 3/28 and 3/29  * Increased oxygen needs 3/29 (ultimately requiring Bipap) with repeat CXR showing increase in bilateral patchy opacities  * Repeat COVID, procal, trop, BNP negative, resp viral panel negative, EKG unremarkable on 3/29  * TTE 3/29 with EF 50-55% without WMA, mildly decreased RV systolic function with mild dilation.  * Found to have DVT 3/29 but with normal hemodynamics, normal trop and BNP, no significant RV strain on echo and risk for renal injury with contrast and initiation of high dose Bactrim, did not pursue CT imaging given very low concern for saddle embolus with need for thrombectomy.  * ID consulted 3/29, suspect PJP with prolonged steroid course following neurosurgery (see below).  * Switched from ceftriaxone to zosyn 3/29 and initiated on high dose Bactrim and increased steroids  * 1, 3 beta glucan and fungitell or positive and likely indicative of PJP  - currently on zosyn, doxy and high dose Bactrim, anticipate discontinuing zosyn soon  - continue methylpred 32 mg IV bid (converted from prednisone 40 mg bid)  - albuterol q4h while awake  - stable on Bipap and high flow  - pulmonary consultation to help manage bipap,  "high flow  - aspergillus and aspergillus Ag negative    Bilateral LE DVT  US 3/29 showing occlusive DVT of bilateral posterior tibial veins and soleal veins to mid calf.  - continue heparin gtt;  - ultimately transition to lovenox on 4/1     Hypokalemia  - K protocol     GBM s/p resection 2/23/21  Followed by Dr. Baker of Neuro-Oncology.  She is in process of being set up to start chemotherapy and XRT next week.   Appears to have been on prolonged steroids following neurosurgery as was discharged without plan for taper and initiated taper only recently in follow up with Dr. Baker.  - was down to decadron 2mg daily per taper;   discontinued decadron with initiation of high dose steroid for PJP as above   - has not yet initiated chemo (temozolomide) or Bactrim ppx     Hypertension  - continue PTA amlodipine     Asthma  - albuterol as above     Depression and anxiety  Insomnia  - continue PTA Prozac and Buspar  - continue PTA Atarax for sleep  - prn lorazepam     GERD  - continue PTA Pepcid     Chronic anemia  Admission hgb 11.4 consistent with recent baseline.      Diet: Snacks/Supplements Adult: Boost Shake; Between Meals  NPO for Medical/Clinical Reasons Except for: No Exceptions    DVT Prophylaxis: heparin gtt  Martino Catheter: not present  Code Status: Full Code         Disposition Plan  > 3-5 days       Interval History   -- chart reviewed  -- patient seen with daughter at bedside  -- patient fully alert and oriented on bipap  -- discussed with ID, RN and RT    -Data reviewed today: I reviewed all new labs and imaging over the last 24 hours. I personally reviewed no images or EKG's today.    Physical Exam    , Blood pressure 122/71, pulse 75, temperature 99.1  F (37.3  C), temperature source Oral, resp. rate 30, height 1.6 m (5' 3\"), weight 68.3 kg (150 lb 9.6 oz), SpO2 94 %.  Vitals:    03/29/21 0730 03/30/21 0632 03/31/21 0600   Weight: 68.9 kg (151 lb 12.8 oz) 68.7 kg (151 lb 8 oz) 68.3 kg (150 lb 9.6 oz) "     Vital Signs with Ranges  Temp:  [98.5  F (36.9  C)-99.1  F (37.3  C)] 99.1  F (37.3  C)  Pulse:  [73-89] 75  Resp:  [15-32] 30  BP: (117-125)/() 122/71  FiO2 (%):  [50 %-90 %] 50 %  SpO2:  [89 %-99 %] 94 %  I/O's Last 24 hours  I/O last 3 completed shifts:  In: 220 [P.O.:120; I.V.:100]  Out: 2600 [Urine:2600]    Constitutional: Awake, alert, cooperative, no apparent distress, on bipap  Respiratory: Rare crackles  Cardiovascular: Regular rate and rhythm, normal S1 and S2, and no murmur noted  GI: Normal bowel sounds, soft, non-distended, non-tender  Skin/Integumen: No rashes, no cyanosis, no edema  Other:      Medications   All medications were reviewed.    heparin 950 Units/hr (03/31/21 0036)     - MEDICATION INSTRUCTIONS -         albuterol  2.5 mg Nebulization Q4H While awake     amLODIPine  10 mg Oral Daily     busPIRone HCl  30 mg Oral BID     doxycycline (VIBRAMYCIN) IV  100 mg Intravenous Q12H     [Held by provider] doxycycline hyclate  100 mg Oral Q12H ELGIN     famotidine  20 mg Intravenous Q12H     [Held by provider] famotidine  20 mg Oral BID     FLUoxetine  80 mg Oral Daily     methylPREDNISolone  32 mg Intravenous Q12H     piperacillin-tazobactam  3.375 g Intravenous Q6H     [Held by provider] predniSONE  40 mg Oral BID w/meals     sodium chloride (PF)  3 mL Intracatheter Q8H     sulfamethoxazole-trimethoprim  320 mg Intravenous Q8H        Data   Recent Labs   Lab 03/31/21  0514 03/30/21  0502 03/29/21  1947 03/29/21  1411 03/29/21  0820 03/27/21  0933 03/27/21  0933 03/26/21  1715   WBC 10.5 9.8  --  9.2 9.2  --  8.3 9.4   HGB 10.1* 10.7*  --  11.4* 11.2*  --  12.0 11.4*   MCV 87 89  --  88 88  --  91 91    157  --  180 171  --  180 180    131* 131*  --  133  --  137 134   POTASSIUM 4.2 4.3 3.7  --  3.8   < > 3.1* 4.1   CHLORIDE 105 100 99  --  100  --  104 103   CO2 28 27 29  --  30  --  30 28   BUN 18 19 21  --  19  --  21 26   CR 0.64 0.55 0.57  --  0.51*  --  0.56 0.64    ANIONGAP 3 4 3  --  3  --  3 3   ESTIVEN 8.7 8.6 8.8  --  8.8  --  8.7 8.5   * 168* 156*  --  83  --  99 187*   ALBUMIN  --   --  2.8*  --   --   --  3.0* 3.0*   PROTTOTAL  --   --  6.7*  --   --   --  6.1* 6.3*   BILITOTAL  --   --  0.3  --   --   --  0.5 0.4   ALKPHOS  --   --  74  --   --   --  69 68   ALT  --   --  36  --   --   --  36 39   AST  --   --  26  --   --   --  20 28   TROPI  --   --  <0.015  --  <0.015  --   --  <0.015    < > = values in this interval not displayed.       Recent Results (from the past 24 hour(s))   XR Chest Port 1 View    Narrative    XR CHEST PORT 1 VW 3/31/2021 2:04 PM    HISTORY: resp faiure    COMPARISON: 3/29/2021      Impression    IMPRESSION: Slight progression in left basilar patchy infiltrate or  atelectasis with otherwise stable multifocal airspace opacities in  both lungs. No pleural effusion or pneumothorax. The cardiac and  mediastinal silhouettes are normal. Instrumented fusion in the  cervical spine.    MD Doug CARLISLE MD  Text Page  (7am to 6pm)

## 2021-04-01 LAB
ALBUMIN SERPL-MCNC: 2.3 G/DL (ref 3.4–5)
ALP SERPL-CCNC: 70 U/L (ref 40–150)
ALT SERPL W P-5'-P-CCNC: 26 U/L (ref 0–50)
ANION GAP SERPL CALCULATED.3IONS-SCNC: 6 MMOL/L (ref 3–14)
AST SERPL W P-5'-P-CCNC: 26 U/L (ref 0–45)
BILIRUB DIRECT SERPL-MCNC: <0.1 MG/DL (ref 0–0.2)
BILIRUB SERPL-MCNC: 0.2 MG/DL (ref 0.2–1.3)
BUN SERPL-MCNC: 18 MG/DL (ref 7–30)
CALCIUM SERPL-MCNC: 8.6 MG/DL (ref 8.5–10.1)
CHLORIDE SERPL-SCNC: 100 MMOL/L (ref 94–109)
CO2 SERPL-SCNC: 25 MMOL/L (ref 20–32)
CREAT SERPL-MCNC: 0.56 MG/DL (ref 0.52–1.04)
ERYTHROCYTE [DISTWIDTH] IN BLOOD BY AUTOMATED COUNT: 14.6 % (ref 10–15)
GFR SERPL CREATININE-BSD FRML MDRD: >90 ML/MIN/{1.73_M2}
GLUCOSE SERPL-MCNC: 144 MG/DL (ref 70–99)
HCT VFR BLD AUTO: 30.4 % (ref 35–47)
HGB BLD-MCNC: 9.8 G/DL (ref 11.7–15.7)
MAGNESIUM SERPL-MCNC: 2.2 MG/DL (ref 1.6–2.3)
MCH RBC QN AUTO: 28.3 PG (ref 26.5–33)
MCHC RBC AUTO-ENTMCNC: 32.2 G/DL (ref 31.5–36.5)
MCV RBC AUTO: 88 FL (ref 78–100)
PHOSPHATE SERPL-MCNC: 2.2 MG/DL (ref 2.5–4.5)
PLATELET # BLD AUTO: 185 10E9/L (ref 150–450)
POTASSIUM SERPL-SCNC: 4.4 MMOL/L (ref 3.4–5.3)
PROT SERPL-MCNC: 5.8 G/DL (ref 6.8–8.8)
RBC # BLD AUTO: 3.46 10E12/L (ref 3.8–5.2)
SODIUM SERPL-SCNC: 131 MMOL/L (ref 133–144)
TRIGL SERPL-MCNC: 94 MG/DL
UFH PPP CHRO-ACNC: 0.68 IU/ML
WBC # BLD AUTO: 10.5 10E9/L (ref 4–11)

## 2021-04-01 PROCEDURE — 120N000001 HC R&B MED SURG/OB

## 2021-04-01 PROCEDURE — 250N000011 HC RX IP 250 OP 636: Performed by: HOSPITALIST

## 2021-04-01 PROCEDURE — 94640 AIRWAY INHALATION TREATMENT: CPT

## 2021-04-01 PROCEDURE — 250N000013 HC RX MED GY IP 250 OP 250 PS 637: Performed by: HOSPITALIST

## 2021-04-01 PROCEDURE — 250N000011 HC RX IP 250 OP 636: Performed by: INTERNAL MEDICINE

## 2021-04-01 PROCEDURE — 250N000009 HC RX 250: Performed by: NURSE PRACTITIONER

## 2021-04-01 PROCEDURE — 84100 ASSAY OF PHOSPHORUS: CPT | Performed by: INTERNAL MEDICINE

## 2021-04-01 PROCEDURE — 250N000009 HC RX 250: Performed by: HOSPITALIST

## 2021-04-01 PROCEDURE — 999N000157 HC STATISTIC RCP TIME EA 10 MIN

## 2021-04-01 PROCEDURE — 84100 ASSAY OF PHOSPHORUS: CPT | Performed by: NURSE PRACTITIONER

## 2021-04-01 PROCEDURE — 258N000003 HC RX IP 258 OP 636: Performed by: INTERNAL MEDICINE

## 2021-04-01 PROCEDURE — 250N000009 HC RX 250: Performed by: INTERNAL MEDICINE

## 2021-04-01 PROCEDURE — 85520 HEPARIN ASSAY: CPT | Performed by: NURSE PRACTITIONER

## 2021-04-01 PROCEDURE — 94640 AIRWAY INHALATION TREATMENT: CPT | Mod: 76

## 2021-04-01 PROCEDURE — 272N000452 HC KIT SHRLOCK 5FR POWER PICC TRIPLE LUMEN

## 2021-04-01 PROCEDURE — 94660 CPAP INITIATION&MGMT: CPT

## 2021-04-01 PROCEDURE — 80076 HEPATIC FUNCTION PANEL: CPT | Performed by: NURSE PRACTITIONER

## 2021-04-01 PROCEDURE — 84478 ASSAY OF TRIGLYCERIDES: CPT | Performed by: NURSE PRACTITIONER

## 2021-04-01 PROCEDURE — 36569 INSJ PICC 5 YR+ W/O IMAGING: CPT

## 2021-04-01 PROCEDURE — 120N000013 HC R&B IMCU

## 2021-04-01 PROCEDURE — 85027 COMPLETE CBC AUTOMATED: CPT | Performed by: NURSE PRACTITIONER

## 2021-04-01 PROCEDURE — 36415 COLL VENOUS BLD VENIPUNCTURE: CPT | Performed by: NURSE PRACTITIONER

## 2021-04-01 PROCEDURE — 99233 SBSQ HOSP IP/OBS HIGH 50: CPT | Performed by: INTERNAL MEDICINE

## 2021-04-01 PROCEDURE — 83735 ASSAY OF MAGNESIUM: CPT | Performed by: NURSE PRACTITIONER

## 2021-04-01 PROCEDURE — 250N000011 HC RX IP 250 OP 636: Performed by: NURSE PRACTITIONER

## 2021-04-01 PROCEDURE — 80048 BASIC METABOLIC PNL TOTAL CA: CPT | Performed by: NURSE PRACTITIONER

## 2021-04-01 PROCEDURE — 258N000003 HC RX IP 258 OP 636: Performed by: HOSPITALIST

## 2021-04-01 RX ORDER — HEPARIN SODIUM,PORCINE 10 UNIT/ML
2-5 VIAL (ML) INTRAVENOUS
Status: ACTIVE | OUTPATIENT
Start: 2021-04-01 | End: 2021-04-04

## 2021-04-01 RX ORDER — DEXTROSE MONOHYDRATE 100 MG/ML
INJECTION, SOLUTION INTRAVENOUS CONTINUOUS PRN
Status: DISCONTINUED | OUTPATIENT
Start: 2021-04-01 | End: 2021-04-09

## 2021-04-01 RX ORDER — DEXTROSE MONOHYDRATE 100 MG/ML
INJECTION, SOLUTION INTRAVENOUS CONTINUOUS PRN
Status: DISCONTINUED | OUTPATIENT
Start: 2021-04-01 | End: 2021-04-14

## 2021-04-01 RX ORDER — LIDOCAINE 40 MG/G
CREAM TOPICAL
Status: ACTIVE | OUTPATIENT
Start: 2021-04-01 | End: 2021-04-04

## 2021-04-01 RX ORDER — MULTIPLE VITAMINS W/ MINERALS TAB 9MG-400MCG
1 TAB ORAL DAILY
Status: DISCONTINUED | OUTPATIENT
Start: 2021-04-01 | End: 2021-04-01

## 2021-04-01 RX ORDER — NICOTINE POLACRILEX 4 MG
15-30 LOZENGE BUCCAL
Status: DISCONTINUED | OUTPATIENT
Start: 2021-04-01 | End: 2021-04-14

## 2021-04-01 RX ORDER — DEXTROSE MONOHYDRATE 25 G/50ML
25-50 INJECTION, SOLUTION INTRAVENOUS
Status: DISCONTINUED | OUTPATIENT
Start: 2021-04-01 | End: 2021-04-14

## 2021-04-01 RX ADMIN — ALBUTEROL SULFATE 2.5 MG: 2.5 SOLUTION RESPIRATORY (INHALATION) at 23:54

## 2021-04-01 RX ADMIN — ENOXAPARIN SODIUM 70 MG: 80 INJECTION SUBCUTANEOUS at 10:30

## 2021-04-01 RX ADMIN — SULFAMETHOXAZOLE AND TRIMETHOPRIM 320 MG: 80; 16 INJECTION, SOLUTION, CONCENTRATE INTRAVENOUS at 02:10

## 2021-04-01 RX ADMIN — METHYLPREDNISOLONE SODIUM SUCCINATE 32 MG: 40 INJECTION, POWDER, FOR SOLUTION INTRAMUSCULAR; INTRAVENOUS at 20:48

## 2021-04-01 RX ADMIN — PIPERACILLIN SODIUM AND TAZOBACTAM SODIUM 3.38 G: 3; .375 INJECTION, POWDER, LYOPHILIZED, FOR SOLUTION INTRAVENOUS at 04:12

## 2021-04-01 RX ADMIN — METHYLPREDNISOLONE SODIUM SUCCINATE 32 MG: 40 INJECTION, POWDER, FOR SOLUTION INTRAMUSCULAR; INTRAVENOUS at 10:30

## 2021-04-01 RX ADMIN — ENOXAPARIN SODIUM 70 MG: 80 INJECTION SUBCUTANEOUS at 20:50

## 2021-04-01 RX ADMIN — ALBUTEROL SULFATE 2.5 MG: 2.5 SOLUTION RESPIRATORY (INHALATION) at 11:49

## 2021-04-01 RX ADMIN — LORAZEPAM 0.5 MG: 2 INJECTION INTRAMUSCULAR; INTRAVENOUS at 00:15

## 2021-04-01 RX ADMIN — AMLODIPINE BESYLATE 10 MG: 10 TABLET ORAL at 12:55

## 2021-04-01 RX ADMIN — SODIUM PHOSPHATE, MONOBASIC, MONOHYDRATE 9 MMOL: 276; 142 INJECTION, SOLUTION INTRAVENOUS at 19:08

## 2021-04-01 RX ADMIN — DOXYCYCLINE 100 MG: 100 INJECTION, POWDER, LYOPHILIZED, FOR SOLUTION INTRAVENOUS at 12:09

## 2021-04-01 RX ADMIN — BUSPIRONE HYDROCHLORIDE 30 MG: 15 TABLET ORAL at 12:55

## 2021-04-01 RX ADMIN — ASCORBIC ACID, VITAMIN A PALMITATE, CHOLECALCIFEROL, THIAMINE HYDROCHLORIDE, RIBOFLAVIN-5 PHOSPHATE SODIUM, PYRIDOXINE HYDROCHLORIDE, NIACINAMIDE, DEXPANTHENOL, ALPHA-TOCOPHEROL ACETATE, VITAMIN K1, FOLIC ACID, BIOTIN, CYANOCOBALAMIN: 200; 3300; 200; 6; 3.6; 6; 40; 15; 10; 150; 600; 60; 5 INJECTION, SOLUTION INTRAVENOUS at 23:59

## 2021-04-01 RX ADMIN — ALBUTEROL SULFATE 2.5 MG: 2.5 SOLUTION RESPIRATORY (INHALATION) at 16:31

## 2021-04-01 RX ADMIN — DOXYCYCLINE 100 MG: 100 INJECTION, POWDER, LYOPHILIZED, FOR SOLUTION INTRAVENOUS at 00:15

## 2021-04-01 RX ADMIN — ALBUTEROL SULFATE 2.5 MG: 2.5 SOLUTION RESPIRATORY (INHALATION) at 20:55

## 2021-04-01 RX ADMIN — LORAZEPAM 0.5 MG: 2 INJECTION INTRAMUSCULAR; INTRAVENOUS at 14:36

## 2021-04-01 RX ADMIN — I.V. FAT EMULSION 250 ML: 20 EMULSION INTRAVENOUS at 20:49

## 2021-04-01 RX ADMIN — BUSPIRONE HYDROCHLORIDE 30 MG: 15 TABLET ORAL at 20:49

## 2021-04-01 RX ADMIN — PIPERACILLIN SODIUM AND TAZOBACTAM SODIUM 3.38 G: 3; .375 INJECTION, POWDER, LYOPHILIZED, FOR SOLUTION INTRAVENOUS at 20:48

## 2021-04-01 RX ADMIN — ALBUTEROL SULFATE 2.5 MG: 2.5 SOLUTION RESPIRATORY (INHALATION) at 07:59

## 2021-04-01 RX ADMIN — PIPERACILLIN SODIUM AND TAZOBACTAM SODIUM 3.38 G: 3; .375 INJECTION, POWDER, LYOPHILIZED, FOR SOLUTION INTRAVENOUS at 16:45

## 2021-04-01 RX ADMIN — FAMOTIDINE 20 MG: 10 INJECTION, SOLUTION INTRAVENOUS at 20:49

## 2021-04-01 RX ADMIN — SULFAMETHOXAZOLE AND TRIMETHOPRIM 320 MG: 80; 16 INJECTION, SOLUTION, CONCENTRATE INTRAVENOUS at 18:40

## 2021-04-01 RX ADMIN — LORAZEPAM 0.5 MG: 2 INJECTION INTRAMUSCULAR; INTRAVENOUS at 21:27

## 2021-04-01 RX ADMIN — FAMOTIDINE 20 MG: 10 INJECTION, SOLUTION INTRAVENOUS at 10:30

## 2021-04-01 RX ADMIN — PIPERACILLIN SODIUM AND TAZOBACTAM SODIUM 3.38 G: 3; .375 INJECTION, POWDER, LYOPHILIZED, FOR SOLUTION INTRAVENOUS at 10:43

## 2021-04-01 RX ADMIN — FLUOXETINE 80 MG: 20 CAPSULE ORAL at 12:56

## 2021-04-01 RX ADMIN — HEPARIN SODIUM 950 UNITS/HR: 10000 INJECTION, SOLUTION INTRAVENOUS at 04:11

## 2021-04-01 RX ADMIN — SULFAMETHOXAZOLE AND TRIMETHOPRIM 320 MG: 80; 16 INJECTION, SOLUTION, CONCENTRATE INTRAVENOUS at 10:20

## 2021-04-01 RX ADMIN — LIDOCAINE HYDROCHLORIDE ANHYDROUS 1 ML: 10 INJECTION, SOLUTION INFILTRATION at 18:21

## 2021-04-01 ASSESSMENT — ACTIVITIES OF DAILY LIVING (ADL)
EQUIPMENT_CURRENTLY_USED_AT_HOME: CANE, QUAD
ADLS_ACUITY_SCORE: 18
VISION_MANAGEMENT: GLASSES
FALL_HISTORY_WITHIN_LAST_SIX_MONTHS: NO
PATIENT_/_FAMILY_COMMUNICATION_STYLE: SPOKEN LANGUAGE (ENGLISH OR BILINGUAL)
DOING_ERRANDS_INDEPENDENTLY_DIFFICULTY: NO
WALKING_OR_CLIMBING_STAIRS_DIFFICULTY: YES
ADLS_ACUITY_SCORE: 18
ADLS_ACUITY_SCORE: 18
WEAR_GLASSES_OR_BLIND: YES
TOILETING_ISSUES: NO
ADLS_ACUITY_SCORE: 18
DIFFICULTY_COMMUNICATING: NO
ADLS_ACUITY_SCORE: 18
DRESSING/BATHING_DIFFICULTY: YES
DRESSING/BATHING: BATHING DIFFICULTY, ASSISTANCE 1 PERSON
HEARING_DIFFICULTY_OR_DEAF: NO
WALKING_OR_CLIMBING_STAIRS: STAIR CLIMBING DIFFICULTY, REQUIRES EQUIPMENT
CONCENTRATING,_REMEMBERING_OR_MAKING_DECISIONS_DIFFICULTY: NO
DIFFICULTY_EATING/SWALLOWING: NO
ADLS_ACUITY_SCORE: 18

## 2021-04-01 ASSESSMENT — MIFFLIN-ST. JEOR: SCORE: 1145.13

## 2021-04-01 NOTE — PLAN OF CARE
A+Ox4 VSS on Bipap at 50%. LS diminished, extremely dyspneic on exertion. Tele NSR. Bowels active, flatus+, BM+. Voiding adequately. Denies pain. NPO overnight per Bipap. Up w/ 1. Heparin gtt at 950 units/hr. Up w/ 1.

## 2021-04-01 NOTE — PROVIDER NOTIFICATION
Paged Dr Bob, there  Are many unreconciled orders under singed and held orders. Wanted to clarify if any should be signed and released, Dr Bob will look through the orders in am.
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
## 2021-04-01 NOTE — PROGRESS NOTES
[facility]    Infectious Disease Progress Note    Date of Service (when I saw the patient): 04/01/2021     Assessment & Plan   Olga Bailey is a 75 year old female who was admitted on 3/26/2021.     Impression:  1. 75 y.o with recent diagnosis of craniotomy and resection of glioblastoma multiforme.   2. Has been on very high dose steroid taper for the past month since the craniotomy. Not on any bactrim prophylaxis before admission.    3. Admitted with shortness of breath.   4. Found to have bilateral ground glass infiltrates on the imaging, very hypoxic.   5. COVID PCR negative x 2.   6. No leucocytosis, procal not elevated.      Recommendations:   High suspicion for PJP pneumonia. LDH high, high beta d glucan unable to confirm with the sputum studies as patient has been unable to provide any sputum   On zosyn ( will stop after 7  Days)  And on treatment dose for PJP as we wait on sputum cultures. Stoped doxy after today 5 days of atypical organism coverage, legionella antigen is negative.   Steroids for hypoxia.   Get sputum for regular bacterial culture, PJP if possible when ever patient able to provide sputum ( labs have been ordered)   Discussed with FACUNDO Branham MD    Interval History   On bipap 55 l   No fever   No new micro   No new complaints       Physical Exam   Temp: 97.5  F (36.4  C) Temp src: Axillary BP: 131/80 Pulse: 81   Resp: 19 SpO2: 93 % O2 Device: BiPAP/CPAP Oxygen Delivery: 55 LPM  Vitals:    03/30/21 0632 03/31/21 0600 04/01/21 0625   Weight: 68.7 kg (151 lb 8 oz) 68.3 kg (150 lb 9.6 oz) 68.1 kg (150 lb 2.1 oz)     Vital Signs with Ranges  Temp:  [97.5  F (36.4  C)-99.7  F (37.6  C)] 97.5  F (36.4  C)  Pulse:  [68-81] 81  Resp:  [17-32] 19  BP: (115-139)/(59-80) 131/80  FiO2 (%):  [50 %-100 %] 50 %  SpO2:  [91 %-96 %] 93 %    Constitutional: Awake, alert  Lungs: diminished bilateral   Cardiovascular: Regular rate and rhythm, normal S1 and S2, and no  murmur noted  Abdomen: Normal bowel sounds, soft, non-distended, non-tender  Skin: No rashes, no cyanosis, no edema  Other:    Medications     - MEDICATION INSTRUCTIONS -         albuterol  2.5 mg Nebulization Q4H While awake     amLODIPine  10 mg Oral Daily     busPIRone HCl  30 mg Oral BID     [Held by provider] doxycycline hyclate  100 mg Oral Q12H ELGIN     enoxaparin ANTICOAGULANT  70 mg Subcutaneous Q12H     famotidine  20 mg Intravenous Q12H     [Held by provider] famotidine  20 mg Oral BID     FLUoxetine  80 mg Oral Daily     methylPREDNISolone  32 mg Intravenous Q12H     piperacillin-tazobactam  3.375 g Intravenous Q6H     [Held by provider] predniSONE  40 mg Oral BID w/meals     sodium chloride (PF)  3 mL Intracatheter Q8H     sulfamethoxazole-trimethoprim  320 mg Intravenous Q8H       Data   All microbiology laboratory data reviewed.  Recent Labs   Lab Test 04/01/21  0500 03/31/21  0514 03/30/21  0502   WBC 10.5 10.5 9.8   HGB 9.8* 10.1* 10.7*   HCT 30.4* 30.3* 32.5*   MCV 88 87 89    171 157     Recent Labs   Lab Test 04/01/21  0500 03/31/21  0514 03/30/21  0502   CR 0.56 0.64 0.55     No lab results found.  Recent Labs   Lab Test 03/29/21  1951 03/29/21  1946   CULT No growth after 3 days No growth after 3 days       Attestation:  Total time on the floor involved in the patient's care: 35 minutes. Total time spent in counseling/care coordination: >50%

## 2021-04-01 NOTE — PLAN OF CARE
OT: Pt continues to be BiPap and not tolerating therapy at this time. Will complete orders at this time. Please reorder when appropriate.

## 2021-04-01 NOTE — PLAN OF CARE
PT: Per chart review, patient still on BiPAP and unable to tolerate therapies. Patient has not been medically stable or appropriate for 4 days of attempts. PT orders completed, please re-order when appropriate.

## 2021-04-01 NOTE — PLAN OF CARE
A&O x 4. Mild tachypnea, otherwise VSS on bipap with O2 at 50%. Respiratory recommending staying on bipap as much as possible, O2 sats drop quickly when on high flow and takes a few minutes to recover. Assist of 1 to bedside commode or bed pan. regular diet when on high flow, tolerating okay, plans to add TPN tonight. PIV x2, intermittent abx, PICC line placed this evening. Phos replacement ordered. Denies nausea. JACOBSEN, managed with Bipap and rest. Voiding adequately in bedside commode or bed pan. BS active, +BM this AM. Continue plan of care.

## 2021-04-01 NOTE — CONSULTS
CLINICAL NUTRITION SERVICES  -  ASSESSMENT NOTE      RECOMMENDATIONS FOR MD/PROVIDER TO ORDER:   Recommend consider TF (when FT placement would be feasible) as pt has a functional gut.     Recommendations Ordered by Registered Dietitian (RD):   Step 1:  Begin TPN D15 AA5 at 40 mL/hr + Lipids 250 mL every other day = 932 kcals (17 kcal/kg), 48 gm pro (0.9 gm/kg), 144 gm CHO, 27% fat    Step 2:  After 24 hrs, if labs acceptable (K, Mg, Phos), increase TPN D15 AA5 to goal 60 mL/hr + Lipids 250 mL daily = 1522 kcals (27 kcal/kg), 72 gm pro (1.3 gm/kg), 216 gm CHO, 33% fat   Future/Additional Recommendations:   If TF employed, recommend eventual goal TF Isosource 1.5 at 45 mL/hr = 1620 kcals (29 kcal/kg), 73 gm pro (1.3 gm/kg), 820 mL H20, 190 gm CHO, 16 gm fiber   Malnutrition:   % Weight Loss:  None noted  % Intake:  </= 50% for >/= 5 days (severe malnutrition) - Suspexct  Subcutaneous Fat Loss:  Unable to determine  Muscle Loss:  Unable to determine  Fluid Retention:  None noted    Malnutrition Diagnosis: Unable to determine due to lack of Nutrition Focused Physical Exam        REASON FOR ASSESSMENT  Olga Bailey is a 75 year old female seen by Registered Dietitian for Pharmacy/Nutrition to Start and Manage PN    DX:   Dyspnea   Acute bilateral pneumonia.     PMH:   Glioblastoma w/crani on 2/23   Asthma   Depression and anxiety   Insomnia   GERD   Chronic anemia     NUTRITION HISTORY  - Unable to obtain nutrition history.  During [facility] admission in Feb 2021, after her crani surgery, pt was on a DD2 with thin liquids + Boost Plus BID btw meals.  She was eating % meals at that time.  No food allergies/intolerances.  Pt is weak and deconditioned.      CURRENT NUTRITION ORDERS  Diet Order:     Regular + Boost shakes between meals BID.    Current Intake/Tolerance:  Variable oral intake since admission per nursing flow sheets, but overall poor.  She did consume 50% breakfast and lunch today.  She took 800 mL oral  "today.  BM x1 today.  Pt currently on bipap and central line for TPN has been ordered.      NUTRITION FOCUSED PHYSICAL ASSESSMENT FOR DIAGNOSING MALNUTRITION)  No:  Unable               Observed:    N/A    Obtained from Chart/Interdisciplinary Team:  None noted    ANTHROPOMETRICS  Height: 5' 3\"  Admit Weight:  68.1 kg  Current Wt:  69.9 kg (up 1.8 kg since admit)  Body mass index is 26.59 kg/m .  Weight Status:  Overweight BMI 25-29.9  IBW:  52.2 kg  % IBW:  130%  Weight History:    - Weight has varied btw 150-164 lbs over the past month.  - Suspect 68 kg is her dry weight.    Wt Readings from Last 20 Encounters:   04/01/21 68.1 kg (150 lb 2.1 oz)   03/23/21 70 kg (154 lb 6.4 oz)   03/08/21 69.9 kg (154 lb)   02/26/21 74.4 kg (164 lb 1.6 oz)   02/24/21 68.4 kg   11/20/15 68 kg (150 lb)   01/22/08 69.9 kg (154 lb)   08/15/07 71.2 kg (157 lb)   01/29/07 68 kg (150 lb)   01/09/07 67.6 kg (149 lb)   10/31/06 70.8 kg (156 lb)   01/05/06 69.8 kg (153 lb 12.8 oz)   01/03/06 69.8 kg (153 lb 12.8 oz)   12/21/04 65.3 kg (144 lb)   06/18/04 66.7 kg (147 lb)   12/17/03 64.4 kg (142 lb)   11/24/03 65.3 kg (144 lb)   12/09/02 61.2 kg (135 lb)   11/11/02 59.4 kg (131 lb)       LABS  Labs reviewed  Na 131 (L)   (H)    MEDICATIONS  Medications reviewed  Solumedrol  MVI-M - for supplementation      ASSESSED NUTRITION NEEDS PER APPROVED PRACTICE GUIDELINES:    Dosing Weight:  56.2 kg (adjusted for overweight)  Estimated Energy Needs:  6485-6364 kcals (25-30 Kcal/Kg)  Justification: overweight  Estimated Protein Needs:  67-84 grams protein (1.2-1.5 g pro/Kg)  Justification: hypercatabolism with acute illness  Estimated Fluid Needs:  3663-4662 mL (1 mL/Kcal)  Justification: maintenance    MALNUTRITION:  % Weight Loss:  None noted  % Intake:  </= 50% for >/= 5 days (severe malnutrition) - Suspexct  Subcutaneous Fat Loss:  Unable to determine  Muscle Loss:  Unable to determine  Fluid Retention:  None noted    Malnutrition " Diagnosis: Unable to determine due to lack of Nutrition Focused Physical Exam    NUTRITION DIAGNOSIS:  Inadequate oral intake related to impaired respiratory function and decreased appetite as evidenced by decreased oral intake since admission x 6 days.      NUTRITION INTERVENTIONS  Recommendations / Nutrition Prescription  Step 1:  Begin TPN D15 AA5 at 40 mL/hr + Lipids 250 mL every other day = 932 kcals (17 kcal/kg), 48 gm pro (0.9 gm/kg), 144 gm CHO, 27% fat    Step 2:  After 24 hrs, if labs acceptable (K, Mg, Phos), increase TPN D15 AA5 to goal 60 mL/hr + Lipids 250 mL daily = 1522 kcals (27 kcal/kg), 72 gm pro (1.3 gm/kg), 216 gm CHO, 33% fat    Recommend consider TF (when FT placement would be feasible) as pt has a functional gut.    If TF employed, recommend eventual goal TF Isosource 1.5 at 45 mL/hr = 1620 kcals (29 kcal/kg), 73 gm pro (1.3 gm/kg), 820 mL H20, 190 gm CHO, 16 gm fiber    Implementation  Nutrition education: Not appropriate at this time due to patient condition  Collaboration and Referral of Nutrition care - Discussed with Pharmacist, who was going to discuss with Dr. Bob.  PN Composition and PN Schedule - TPN orders entered in Epic in case it's a go    Nutrition Goals  Pt will tolerate TPN without refeeding syndrome.  TF (as able) will be considered as the more appropriate route for nutrition support.      MONITORING AND EVALUATION:  Progress towards goals will be monitored and evaluated per protocol and Practice Guidelines    Olya Berman, RD, LD, CNSC

## 2021-04-01 NOTE — PROGRESS NOTES
[facility]  Hospitalist Progress Note  Doug Bob MD  04/01/2021    Assessment & Plan   Olga Bailey is a 75 year old female admitted on 3/26/2021.  past medical history that is most notable for recent craniotomy and resection of glioblastoma multiforme, as well as uncomplicated asthma, who presents with dyspnea and is found to have acute bilateral pneumonia.           Acute hypoxic respiratory failure due to bilateral pneumonia, suspected PJP  * Presents with progressive dyspnea.  Afebrile without leukocytosis but left shift and lymphopenia noted on differential.  Admission CT showing bilateral patchy groundglass opacities, negative for PE.  Initially treated with ceftriaxone and doxy.   * COVID19 negative 3/26 and 3/29.    * Procal low 3/28 and 3/29  * Increased oxygen needs 3/29 (ultimately requiring Bipap) with repeat CXR showing increase in bilateral patchy opacities  * Repeat COVID, procal, trop, BNP negative, resp viral panel negative, EKG unremarkable on 3/29  * TTE 3/29 with EF 50-55% without WMA, mildly decreased RV systolic function with mild dilation.  * Found to have DVT 3/29 but with normal hemodynamics, normal trop and BNP, no significant RV strain on echo and risk for renal injury with contrast and initiation of high dose Bactrim, did not pursue CT imaging given very low concern for saddle embolus with need for thrombectomy.  * ID consulted 3/29, suspect PJP with prolonged steroid course following neurosurgery (see below).  * Switched from ceftriaxone to zosyn 3/29 and initiated on high dose Bactrim and increased steroids  * 1, 3 beta glucan and fungitell or positive and likely indicative of PJP  - currently on zosyn, doxy and high dose Bactrim, anticipate discontinuing zosyn soon  - continue methylpred 32 mg IV bid (converted from prednisone 40 mg bid)  - albuterol q4h while awake  - stable on Bipap and high flow alternating  - pulmonary consultation to help  "manage bipap, high flow  - aspergillus and aspergillus Ag negative    Bilateral LE DVT  US 3/29 showing occlusive DVT of bilateral posterior tibial veins and soleal veins to mid calf.  - changed from heparin gtt to lovenox today     Hypokalemia  - K protocol     GBM s/p resection 2/23/21  Followed by Dr. Baker of Neuro-Oncology.  She is in process of being set up to start chemotherapy and XRT next week.   Appears to have been on prolonged steroids following neurosurgery as was discharged without plan for taper and initiated taper only recently in follow up with Dr. Baker.  - was down to decadron 2mg daily per taper;   discontinued decadron with initiation of high dose steroid for PJP as above   - has not yet initiated chemo (temozolomide) or Bactrim ppx     Hypertension  - continue PTA amlodipine     Asthma  - albuterol as above     Depression and anxiety  Insomnia  - continue PTA Prozac and Buspar  - continue PTA Atarax for sleep  - prn lorazepam     GERD  - continue PTA Pepcid     Chronic anemia  Admission hgb 11.4 consistent with recent baseline.      Malnutrition  - noted decrease oral intake and drop in albumin  - will place PICC line and start TPN  - discussed with [facility], start with clinimax    Diet: Snacks/Supplements Adult: Boost Shake; Between Meals  Start TPN  DVT Prophylaxis: heparin gtt  Martino Catheter: not present  Code Status: Full Code         Disposition Plan  > 3-5 days       Interval History   -- was able to be off bipap for 90 mins and tolerated some diet  -- pulmonary notes reviewed  -- patient fully alert and oriented on bipap  -- discussed with  RN and RT     -Data reviewed today: I reviewed all new labs and imaging over the last 24 hours. I personally reviewed no images or EKG's today.    Physical Exam    , Blood pressure 134/73, pulse 78, temperature 97.5  F (36.4  C), temperature source Axillary, resp. rate 25, height 1.6 m (5' 3\"), weight 68.1 kg (150 lb 2.1 oz), SpO2 95 %.  Vitals:    03/30/21 " 0632 03/31/21 0600 04/01/21 0625   Weight: 68.7 kg (151 lb 8 oz) 68.3 kg (150 lb 9.6 oz) 68.1 kg (150 lb 2.1 oz)     Vital Signs with Ranges  Temp:  [97.5  F (36.4  C)-99.7  F (37.6  C)] 97.5  F (36.4  C)  Pulse:  [68-81] 78  Resp:  [17-32] 25  BP: (115-139)/(59-80) 134/73  FiO2 (%):  [50 %-100 %] 50 %  SpO2:  [91 %-95 %] 95 %  I/O's Last 24 hours  I/O last 3 completed shifts:  In: 1570 [P.O.:800; I.V.:770]  Out: 1900 [Urine:1900]    Constitutional: Awake, alert, cooperative, no apparent distress, on bipap  Respiratory: Rare crackles  Cardiovascular: Regular rate and rhythm, normal S1 and S2, and no murmur noted  GI: Normal bowel sounds, soft, non-distended, non-tender  Skin/Integumen: No rashes, no cyanosis, no edema  Other:      Medications   All medications were reviewed.    dextrose       - MEDICATION INSTRUCTIONS -         albuterol  2.5 mg Nebulization Q4H While awake     amLODIPine  10 mg Oral Daily     busPIRone HCl  30 mg Oral BID     [Held by provider] doxycycline hyclate  100 mg Oral Q12H ELGIN     enoxaparin ANTICOAGULANT  70 mg Subcutaneous Q12H     famotidine  20 mg Intravenous Q12H     [Held by provider] famotidine  20 mg Oral BID     FLUoxetine  80 mg Oral Daily     lipids  250 mL Intravenous Q24H     methylPREDNISolone  32 mg Intravenous Q12H     multivitamin w/minerals  1 tablet Oral Daily     piperacillin-tazobactam  3.375 g Intravenous Q6H     [Held by provider] predniSONE  40 mg Oral BID w/meals     sodium chloride (PF)  3 mL Intracatheter Q8H     sulfamethoxazole-trimethoprim  320 mg Intravenous Q8H        Data   Recent Labs   Lab 04/01/21  0500 03/31/21  0514 03/30/21  0502 03/29/21  1947 03/29/21  0820 03/29/21  0820 03/26/21  1715 03/26/21  1715   WBC 10.5 10.5 9.8  --    < > 9.2   < > 9.4   HGB 9.8* 10.1* 10.7*  --    < > 11.2*   < > 11.4*   MCV 88 87 89  --    < > 88   < > 91    171 157  --    < > 171   < > 180   * 136 131* 131*  --  133   < > 134   POTASSIUM 4.4 4.2 4.3 3.7  --   3.8   < > 4.1   CHLORIDE 100 105 100 99  --  100   < > 103   CO2 25 28 27 29  --  30   < > 28   BUN 18 18 19 21  --  19   < > 26   CR 0.56 0.64 0.55 0.57  --  0.51*   < > 0.64   ANIONGAP 6 3 4 3  --  3   < > 3   ESTIVEN 8.6 8.7 8.6 8.8  --  8.8   < > 8.5   * 141* 168* 156*  --  83   < > 187*   ALBUMIN 2.3*  --   --  2.8*  --   --    < > 3.0*   PROTTOTAL 5.8*  --   --  6.7*  --   --    < > 6.3*   BILITOTAL 0.2  --   --  0.3  --   --    < > 0.4   ALKPHOS 70  --   --  74  --   --    < > 68   ALT 26  --   --  36  --   --    < > 39   AST 26  --   --  26  --   --    < > 28   TROPI  --   --   --  <0.015  --  <0.015  --  <0.015    < > = values in this interval not displayed.       No results found for this or any previous visit (from the past 24 hour(s)).    Doug Bob MD  Text Page  (7am to 6pm)

## 2021-04-01 NOTE — PROCEDURES
[facility]    Triple Lumen PICC Placement    Date/Time: 4/1/2021 6:12 PM  Performed by: Pio Recinos MD  Authorized by: Pio eRcinos MD   Indications: vascular access    UNIVERSAL PROTOCOL   Site Marked: Yes  Prior Images Obtained and Reviewed:  Yes  Required items: Required blood products, implants, devices and special equipment available    Patient identity confirmed:  Verbally with patient and arm band  NA - No sedation, light sedation, or local anesthesia  Confirmation Checklist:  Patient's identity using two indicators  Time out: Immediately prior to the procedure a time out was called    Universal Protocol: the Joint Commission Universal Protocol was followed    Preparation: Patient was prepped and draped in usual sterile fashion    ESBL (mL):  2         ANESTHESIA    Local Anesthetic: Lidocaine 1% without epinephrine  Anesthetic Total (mL):  1      SEDATION    Patient Sedated: No        Preparation: skin prepped with ChloraPrep  Skin prep agent: skin prep agent completely dried prior to procedure  Sterile barriers: maximum sterile barriers were used: cap, mask, sterile gown, sterile gloves, and large sterile sheet  Hand hygiene: hand hygiene performed prior to central venous catheter insertion  Catheter type: triple lumen  Lumen type: valved  Catheter size: 5 Fr  Brand: [employer]  Lot number: [id number]  Placement method: MST  Number of attempts: 1  Successful placement: yes  Orientation: left  Location: basilic vein  Extremity circumference: 28.5  Visible catheter length: 2  Internal length: 40 cm  Total catheter length: 42  Dressing and securement: blood removed, dressing applied, occlusive dressing applied and securement device  Post procedure assessment: blood return through all ports and free fluid flow (3CG Tip Confirmation)  PROCEDURE   Patient Tolerance:  Patient tolerated the procedure well with no immediate complications  Describe Procedure: PICC inserted WARNER  without difficulty.

## 2021-04-01 NOTE — PROGRESS NOTES
Pulmonary Note    She is still requiring biPAP and is profoundly weak; atypical pneumonia with infiltrates that are not that extensive; has not been able to eat.     Consider TF or TPN to provided needed calories, continue pulmonary toilet and biPAP.   Antiinfectives per ID.     We will follow.     [facility]  [phone]

## 2021-04-02 ENCOUNTER — APPOINTMENT (OUTPATIENT)
Dept: GENERAL RADIOLOGY | Facility: CLINIC | Age: 76
DRG: 166 | End: 2021-04-02
Attending: INTERNAL MEDICINE
Payer: MEDICARE

## 2021-04-02 ENCOUNTER — PATIENT OUTREACH (OUTPATIENT)
Dept: ONCOLOGY | Facility: CLINIC | Age: 76
End: 2021-04-02

## 2021-04-02 LAB
ALBUMIN SERPL-MCNC: 2.4 G/DL (ref 3.4–5)
ALBUMIN UR-MCNC: NEGATIVE MG/DL
ALP SERPL-CCNC: 78 U/L (ref 40–150)
ALT SERPL W P-5'-P-CCNC: 27 U/L (ref 0–50)
ANION GAP SERPL CALCULATED.3IONS-SCNC: 8 MMOL/L (ref 3–14)
APPEARANCE UR: CLEAR
AST SERPL W P-5'-P-CCNC: 23 U/L (ref 0–45)
BACTERIA #/AREA URNS HPF: ABNORMAL /HPF
BASE EXCESS BLDA CALC-SCNC: 2.7 MMOL/L
BILIRUB DIRECT SERPL-MCNC: <0.1 MG/DL (ref 0–0.2)
BILIRUB SERPL-MCNC: 0.3 MG/DL (ref 0.2–1.3)
BILIRUB UR QL STRIP: NEGATIVE
BUN SERPL-MCNC: 20 MG/DL (ref 7–30)
CALCIUM SERPL-MCNC: 8.7 MG/DL (ref 8.5–10.1)
CHLORIDE SERPL-SCNC: 98 MMOL/L (ref 94–109)
CO2 SERPL-SCNC: 25 MMOL/L (ref 20–32)
COLOR UR AUTO: ABNORMAL
CREAT SERPL-MCNC: 0.57 MG/DL (ref 0.52–1.04)
ERYTHROCYTE [DISTWIDTH] IN BLOOD BY AUTOMATED COUNT: 14.7 % (ref 10–15)
GFR SERPL CREATININE-BSD FRML MDRD: >90 ML/MIN/{1.73_M2}
GLUCOSE BLDC GLUCOMTR-MCNC: 119 MG/DL (ref 70–99)
GLUCOSE BLDC GLUCOMTR-MCNC: 120 MG/DL (ref 70–99)
GLUCOSE BLDC GLUCOMTR-MCNC: 139 MG/DL (ref 70–99)
GLUCOSE BLDC GLUCOMTR-MCNC: 222 MG/DL (ref 70–99)
GLUCOSE BLDC GLUCOMTR-MCNC: 233 MG/DL (ref 70–99)
GLUCOSE SERPL-MCNC: 181 MG/DL (ref 70–99)
GLUCOSE UR STRIP-MCNC: NEGATIVE MG/DL
HCO3 BLD-SCNC: 26 MMOL/L (ref 21–28)
HCT VFR BLD AUTO: 30.4 % (ref 35–47)
HGB BLD-MCNC: 9.9 G/DL (ref 11.7–15.7)
HGB UR QL STRIP: NEGATIVE
INR PPP: 1 (ref 0.86–1.14)
KETONES UR STRIP-MCNC: NEGATIVE MG/DL
LEUKOCYTE ESTERASE UR QL STRIP: NEGATIVE
MAGNESIUM SERPL-MCNC: 2.2 MG/DL (ref 1.6–2.3)
MCH RBC QN AUTO: 28.6 PG (ref 26.5–33)
MCHC RBC AUTO-ENTMCNC: 32.6 G/DL (ref 31.5–36.5)
MCV RBC AUTO: 88 FL (ref 78–100)
MUCOUS THREADS #/AREA URNS LPF: PRESENT /LPF
NITRATE UR QL: NEGATIVE
O2/TOTAL GAS SETTING VFR VENT: ABNORMAL %
OXYHGB MFR BLD: 97 % (ref 92–100)
PCO2 BLD: 35 MM HG (ref 35–45)
PH BLD: 7.49 PH (ref 7.35–7.45)
PH UR STRIP: 7 PH (ref 5–7)
PHOSPHATE SERPL-MCNC: 2.8 MG/DL (ref 2.5–4.5)
PLATELET # BLD AUTO: 206 10E9/L (ref 150–450)
PO2 BLD: 83 MM HG (ref 80–105)
POTASSIUM SERPL-SCNC: 4.4 MMOL/L (ref 3.4–5.3)
PREALB SERPL IA-MCNC: 17 MG/DL (ref 15–45)
PROT SERPL-MCNC: 5.9 G/DL (ref 6.8–8.8)
RBC # BLD AUTO: 3.46 10E12/L (ref 3.8–5.2)
RBC #/AREA URNS AUTO: 4 /HPF (ref 0–2)
SODIUM SERPL-SCNC: 131 MMOL/L (ref 133–144)
SOURCE: ABNORMAL
SP GR UR STRIP: 1.01 (ref 1–1.03)
SQUAMOUS #/AREA URNS AUTO: 0 /HPF (ref 0–1)
UROBILINOGEN UR STRIP-MCNC: 0 MG/DL (ref 0–2)
WBC # BLD AUTO: 10.9 10E9/L (ref 4–11)
WBC #/AREA URNS AUTO: <1 /HPF (ref 0–5)

## 2021-04-02 PROCEDURE — 250N000012 HC RX MED GY IP 250 OP 636 PS 637: Performed by: HOSPITALIST

## 2021-04-02 PROCEDURE — 71045 X-RAY EXAM CHEST 1 VIEW: CPT

## 2021-04-02 PROCEDURE — 85027 COMPLETE CBC AUTOMATED: CPT | Performed by: INTERNAL MEDICINE

## 2021-04-02 PROCEDURE — 94660 CPAP INITIATION&MGMT: CPT

## 2021-04-02 PROCEDURE — 84100 ASSAY OF PHOSPHORUS: CPT | Performed by: INTERNAL MEDICINE

## 2021-04-02 PROCEDURE — 999N001017 HC STATISTIC GLUCOSE BY METER IP

## 2021-04-02 PROCEDURE — 82805 BLOOD GASES W/O2 SATURATION: CPT | Performed by: INTERNAL MEDICINE

## 2021-04-02 PROCEDURE — 250N000011 HC RX IP 250 OP 636: Performed by: HOSPITALIST

## 2021-04-02 PROCEDURE — 99233 SBSQ HOSP IP/OBS HIGH 50: CPT | Performed by: INTERNAL MEDICINE

## 2021-04-02 PROCEDURE — 120N000013 HC R&B IMCU

## 2021-04-02 PROCEDURE — 81001 URINALYSIS AUTO W/SCOPE: CPT | Performed by: INTERNAL MEDICINE

## 2021-04-02 PROCEDURE — 83735 ASSAY OF MAGNESIUM: CPT | Performed by: INTERNAL MEDICINE

## 2021-04-02 PROCEDURE — 250N000009 HC RX 250: Performed by: HOSPITALIST

## 2021-04-02 PROCEDURE — 82248 BILIRUBIN DIRECT: CPT | Performed by: INTERNAL MEDICINE

## 2021-04-02 PROCEDURE — 250N000011 HC RX IP 250 OP 636: Performed by: INTERNAL MEDICINE

## 2021-04-02 PROCEDURE — 80053 COMPREHEN METABOLIC PANEL: CPT | Performed by: INTERNAL MEDICINE

## 2021-04-02 PROCEDURE — 999N000190 HC STATISTIC VAT ROUNDS

## 2021-04-02 PROCEDURE — 250N000009 HC RX 250: Performed by: NURSE PRACTITIONER

## 2021-04-02 PROCEDURE — 120N000001 HC R&B MED SURG/OB

## 2021-04-02 PROCEDURE — 85610 PROTHROMBIN TIME: CPT | Performed by: INTERNAL MEDICINE

## 2021-04-02 PROCEDURE — 258N000003 HC RX IP 258 OP 636: Performed by: HOSPITALIST

## 2021-04-02 PROCEDURE — 94640 AIRWAY INHALATION TREATMENT: CPT

## 2021-04-02 PROCEDURE — 94640 AIRWAY INHALATION TREATMENT: CPT | Mod: 76

## 2021-04-02 PROCEDURE — 36600 WITHDRAWAL OF ARTERIAL BLOOD: CPT

## 2021-04-02 PROCEDURE — 250N000013 HC RX MED GY IP 250 OP 250 PS 637: Performed by: HOSPITALIST

## 2021-04-02 PROCEDURE — 999N000157 HC STATISTIC RCP TIME EA 10 MIN

## 2021-04-02 PROCEDURE — 84134 ASSAY OF PREALBUMIN: CPT | Performed by: INTERNAL MEDICINE

## 2021-04-02 PROCEDURE — 250N000011 HC RX IP 250 OP 636: Performed by: NURSE PRACTITIONER

## 2021-04-02 RX ADMIN — PIPERACILLIN SODIUM AND TAZOBACTAM SODIUM 3.38 G: 3; .375 INJECTION, POWDER, LYOPHILIZED, FOR SOLUTION INTRAVENOUS at 16:49

## 2021-04-02 RX ADMIN — LORAZEPAM 0.5 MG: 2 INJECTION INTRAMUSCULAR; INTRAVENOUS at 13:23

## 2021-04-02 RX ADMIN — ENOXAPARIN SODIUM 70 MG: 80 INJECTION SUBCUTANEOUS at 22:54

## 2021-04-02 RX ADMIN — ALBUTEROL SULFATE 2.5 MG: 2.5 SOLUTION RESPIRATORY (INHALATION) at 19:43

## 2021-04-02 RX ADMIN — ALBUTEROL SULFATE 2.5 MG: 2.5 SOLUTION RESPIRATORY (INHALATION) at 23:01

## 2021-04-02 RX ADMIN — METHYLPREDNISOLONE SODIUM SUCCINATE 32 MG: 40 INJECTION, POWDER, FOR SOLUTION INTRAMUSCULAR; INTRAVENOUS at 09:34

## 2021-04-02 RX ADMIN — ALBUTEROL SULFATE 2.5 MG: 2.5 SOLUTION RESPIRATORY (INHALATION) at 07:39

## 2021-04-02 RX ADMIN — LORAZEPAM 0.5 MG: 2 INJECTION INTRAMUSCULAR; INTRAVENOUS at 23:13

## 2021-04-02 RX ADMIN — SULFAMETHOXAZOLE AND TRIMETHOPRIM 320 MG: 80; 16 INJECTION, SOLUTION, CONCENTRATE INTRAVENOUS at 21:26

## 2021-04-02 RX ADMIN — FLUOXETINE 80 MG: 20 CAPSULE ORAL at 16:50

## 2021-04-02 RX ADMIN — PIPERACILLIN SODIUM AND TAZOBACTAM SODIUM 3.38 G: 3; .375 INJECTION, POWDER, LYOPHILIZED, FOR SOLUTION INTRAVENOUS at 04:38

## 2021-04-02 RX ADMIN — INSULIN ASPART 4 UNITS: 100 INJECTION, SOLUTION INTRAVENOUS; SUBCUTANEOUS at 12:50

## 2021-04-02 RX ADMIN — PIPERACILLIN SODIUM AND TAZOBACTAM SODIUM 3.38 G: 3; .375 INJECTION, POWDER, LYOPHILIZED, FOR SOLUTION INTRAVENOUS at 10:16

## 2021-04-02 RX ADMIN — ENOXAPARIN SODIUM 70 MG: 80 INJECTION SUBCUTANEOUS at 09:34

## 2021-04-02 RX ADMIN — FAMOTIDINE 20 MG: 10 INJECTION, SOLUTION INTRAVENOUS at 09:34

## 2021-04-02 RX ADMIN — ALBUTEROL SULFATE 2.5 MG: 2.5 SOLUTION RESPIRATORY (INHALATION) at 11:15

## 2021-04-02 RX ADMIN — SULFAMETHOXAZOLE AND TRIMETHOPRIM 320 MG: 80; 16 INJECTION, SOLUTION, CONCENTRATE INTRAVENOUS at 01:51

## 2021-04-02 RX ADMIN — BUSPIRONE HYDROCHLORIDE 30 MG: 15 TABLET ORAL at 16:49

## 2021-04-02 RX ADMIN — FAMOTIDINE 20 MG: 10 INJECTION, SOLUTION INTRAVENOUS at 22:54

## 2021-04-02 RX ADMIN — METHYLPREDNISOLONE SODIUM SUCCINATE 32 MG: 40 INJECTION, POWDER, FOR SOLUTION INTRAMUSCULAR; INTRAVENOUS at 22:54

## 2021-04-02 RX ADMIN — INSULIN ASPART 4 UNITS: 100 INJECTION, SOLUTION INTRAVENOUS; SUBCUTANEOUS at 06:23

## 2021-04-02 RX ADMIN — ALBUTEROL SULFATE 2.5 MG: 2.5 SOLUTION RESPIRATORY (INHALATION) at 15:31

## 2021-04-02 ASSESSMENT — ACTIVITIES OF DAILY LIVING (ADL)
ADLS_ACUITY_SCORE: 17
ADLS_ACUITY_SCORE: 17
ADLS_ACUITY_SCORE: 20

## 2021-04-02 NOTE — PROGRESS NOTES
A&O x 4. Mild tachypnea, otherwise VSS on bipap with O2 at 50%. TPN added tonight w/ lipids. IV x 2 , intermittent abx, 3 x PICC line placed yesterday. Deniespain /  shortness of breath. Bed pan void. Rapid desaturation with activity. K/Phos protocols, no replacement needed this shift. Plan to continue abx

## 2021-04-02 NOTE — CONSULTS
Care Management Initial Consult    General Information  Assessment completed with: Olga Foy  Type of CM/SW Visit: Initial Assessment    Primary Care Provider verified and updated as needed: Yes   Readmission within the last 30 days: current reason for admission unrelated to previous admission      Reason for Consult: discharge planning  Advance Care Planning: Advance Care Planning Reviewed: other (comment)(Pt reports she has docs, but they are not on file with [facility])          Communication Assessment  Patient's communication style: spoken language (English or Bilingual)    Hearing Difficulty or Deaf: no   Wear Glasses or Blind: yes    Cognitive  Cognitive/Neuro/Behavioral: WDL                      Living Environment:   People in home: alone     Current living Arrangements: house      Able to return to prior arrangements:         Family/Social Support:  Care provided by: self  Provides care for: no one  Marital Status:   Children(Daughter, La Nena)          Description of Support System: Involved, Supportive    Support Assessment: Adequate family and caregiver support    Current Resources:   Patient receiving home care services: No     Community Resources: None  Equipment currently used at home: cane, quad, walker, rolling  Supplies currently used at home: Oxygen Tubing/Supplies(cpap or bipap)    Employment/Financial:  Employment Status: retired        Financial Concerns: No concerns identified   Referral to Financial Counselor: No       Lifestyle & Psychosocial Needs:        Socioeconomic History     Marital status: Single     Spouse name: Not on file     Number of children: Not on file     Years of education: Not on file     Highest education level: Not on file   Occupational History     Occupation: nurse     Employer: [employer]     Tobacco Use     Smoking status: Never Smoker     Smokeless tobacco: Never Used   Substance and Sexual Activity     Alcohol use: Yes     Comment: very rare     Drug use:  No     Sexual activity: Not Currently       Functional Status:  Prior to admission patient needed assistance:              Mental Health Status:          Chemical Dependency Status:                Values/Beliefs:  Spiritual, Cultural Beliefs, Yarsanism Practices, Values that affect care: yes([facility])               Additional Information:  SW spoke with pt on the possibility that she may need TCU placement at discharge. Pt states she was thinking about the possibility of staying with her daughter at discharge. SW explained the TCU process to pt and how she would stay there temporarily.  Pt states she understands if  TCU placement is needed she would attend. SW to continue to follow for TCU planning.     DAYNA Caban

## 2021-04-02 NOTE — PROGRESS NOTES
CPAP/BiPAP Settings  IPAP: 10  EPAP: 5  Rate: 12  FiO2: 50  Timed Inspiration (sec): 0.9    CPAP/BiPAP/AVAPS/AVAPS AE Alarms  High Pressure: 25  Low Pressure: 5  Low Pressure Delay: 20  High Rate (breaths/min): 45  Low Rate (breaths/min): 5  Alarm Volume Level:10    CPAP/BiPAP/AVAPS/AVAPS AE Patient Assessment  Skin Assessment: Good  Barriers Applied: mepilex applied and pt on total mask   Lung Sounds: Diminished     Carlos Lai, RT.

## 2021-04-02 NOTE — PROGRESS NOTES
Writer contacted patient's daughter La Nena to touch base before the weekend. Updated La Nena her Oncology care tem is reviewing inpatient notes and up to date on patient. Advised La Nena contact the clinic if any Neuro/Onc follow-up questions or concerns int he meantime. Writer will follow-up with La Nena once discharge plan is in place.     Writer updated CORY Carranza pt is still in hospital. Will let them know once discharged to reassess RT start date.    Louise Jerry, JIANN, RN, PHN, OCN  Oncology Care Coordinator  [facility]

## 2021-04-02 NOTE — PROGRESS NOTES
[facility]  Hospitalist Progress Note  Duog Bob MD  04/02/2021    Assessment & Plan   Olga Bailey is a 75 year old female admitted on 3/26/2021.  past medical history that is most notable for recent craniotomy and resection of glioblastoma multiforme, as well as uncomplicated asthma, who presents with dyspnea and is found to have acute bilateral pneumonia.     Acute hypoxic respiratory failure due to bilateral pneumonia, suspected PJP  * Presents with progressive dyspnea.  Afebrile without leukocytosis but left shift and lymphopenia noted on differential.  Admission CT showing bilateral patchy groundglass opacities, negative for PE.  Initially treated with ceftriaxone and doxy.   * COVID19 negative 3/26 and 3/29.    * Procal low 3/28 and 3/29  * Increased oxygen needs 3/29 (ultimately requiring Bipap) with repeat CXR showing increase in bilateral patchy opacities  * Repeat COVID, procal, trop, BNP negative, resp viral panel negative, EKG unremarkable on 3/29  * TTE 3/29 with EF 50-55% without WMA, mildly decreased RV systolic function with mild dilation.  * Found to have DVT 3/29 but with normal hemodynamics, normal trop and BNP, no significant RV strain on echo and risk for renal injury with contrast and initiation of high dose Bactrim, did not pursue CT imaging given very low concern for saddle embolus with need for thrombectomy.  * ID consulted 3/29, suspect PJP with prolonged steroid course following neurosurgery (see below).  * Switched from ceftriaxone to zosyn 3/29 and initiated on high dose Bactrim and increased steroids  * 1, 3 beta glucan and fungitell or positive and likely indicative of PJP  - currently on zosyn, doxy and high dose Bactrim, anticipate discontinuing zosyn soon  - continue methylpred 32 mg IV bid (converted from prednisone 40 mg bid)  - albuterol q4h while awake  - stable on Bipap and high flow alternating  - pulmonary consultation to help manage  "bipap, high flow as able, noted improvement in oxygenation.  - aspergillus and aspergillus Ag negative    Bilateral LE DVT  US 3/29 showing occlusive DVT of bilateral posterior tibial veins and soleal veins to mid calf.  - changed from heparin gtt to lovenox      Urinary incontinence  - started with TPN  - UA negative  - has depends on    Hypokalemia  - K protocol     GBM s/p resection 2/23/21  Followed by Dr. Baker of Neuro-Oncology.  She is in process of being set up to start chemotherapy and XRT next week.   Appears to have been on prolonged steroids following neurosurgery as was discharged without plan for taper and initiated taper only recently in follow up with Dr. Baker.  - was down to decadron 2mg daily per taper;   discontinued decadron with initiation of high dose steroid for PJP as above   - has not yet initiated chemo (temozolomide) or Bactrim ppx     Hypertension  - continue PTA amlodipine     Asthma  - albuterol as above     Depression and anxiety  Insomnia  - continue PTA Prozac and Buspar  - continue PTA Atarax for sleep  - prn lorazepam     GERD  - continue PTA Pepcid     Chronic anemia  Admission hgb 11.4 consistent with recent baseline.      Malnutrition  - noted decrease oral intake and drop in albumin  - PICC line in place  - continue TPN    Diet: Snacks/Supplements Adult: Boost Shake; Between Meals  continue TPN  DVT Prophylaxis: lovenox  Martino Catheter: not present  Code Status: Full Code         Disposition Plan  >  5 days       Interval History   -- started on TPN  -- improved oxygenation on ABG  -- still primarily dependent on bipap  -- discussed with  RN and RT     -Data reviewed today: I reviewed all new labs and imaging over the last 24 hours. I personally reviewed no images or EKG's today.    Physical Exam    , Blood pressure 136/72, pulse 81, temperature 97.8  F (36.6  C), temperature source Oral, resp. rate 21, height 1.6 m (5' 3\"), weight 68.1 kg (150 lb 2.1 oz), SpO2 97 %.  Vitals:    " 03/30/21 0632 03/31/21 0600 04/01/21 0625   Weight: 68.7 kg (151 lb 8 oz) 68.3 kg (150 lb 9.6 oz) 68.1 kg (150 lb 2.1 oz)     Vital Signs with Ranges  Temp:  [97.8  F (36.6  C)] 97.8  F (36.6  C)  Pulse:  [78-93] 81  Resp:  [21-30] 21  BP: (115-136)/(72-89) 136/72  FiO2 (%):  [50 %-70 %] 50 %  SpO2:  [93 %-100 %] 97 %  I/O's Last 24 hours  I/O last 3 completed shifts:  In: 1570 [P.O.:800; I.V.:770]  Out: 1650 [Urine:1650]    Constitutional: Awake, alert, cooperative, no apparent distress, on bipap  Respiratory: Rare crackles  Cardiovascular: Regular rate and rhythm, normal S1 and S2, and no murmur noted  GI: Normal bowel sounds, soft, non-distended, non-tender  Skin/Integumen: No rashes, no cyanosis, no edema  Other:      Medications   All medications were reviewed.    dextrose       dextrose       - MEDICATION INSTRUCTIONS -       parenteral nutrition - Clinimix E       parenteral nutrition - Clinimix E 40 mL/hr at 04/01/21 9289       albuterol  2.5 mg Nebulization Q4H While awake     amLODIPine  10 mg Oral Daily     busPIRone HCl  30 mg Oral BID     [Held by provider] doxycycline hyclate  100 mg Oral Q12H ELGIN     enoxaparin ANTICOAGULANT  70 mg Subcutaneous Q12H     famotidine  20 mg Intravenous Q12H     [Held by provider] famotidine  20 mg Oral BID     FLUoxetine  80 mg Oral Daily     insulin aspart  1-12 Units Subcutaneous Q4H     lipids  250 mL Intravenous Q24H     methylPREDNISolone  32 mg Intravenous Q12H     piperacillin-tazobactam  3.375 g Intravenous Q6H     [Held by provider] predniSONE  40 mg Oral BID w/meals     sodium chloride (PF)  3 mL Intracatheter Q8H     sulfamethoxazole-trimethoprim  320 mg Intravenous Q8H        Data   Recent Labs   Lab 04/02/21  0550 04/01/21  0500 03/31/21  0514 03/29/21  1947 03/29/21  1947 03/29/21  0820 03/29/21  0820 03/26/21  1715 03/26/21  1715   WBC 10.9 10.5 10.5   < >  --    < > 9.2   < > 9.4   HGB 9.9* 9.8* 10.1*   < >  --    < > 11.2*   < > 11.4*   MCV 88 88 87   <  >  --    < > 88   < > 91    185 171   < >  --    < > 171   < > 180   INR 1.00  --   --   --   --   --   --   --   --    * 131* 136   < > 131*  --  133   < > 134   POTASSIUM 4.4 4.4 4.2   < > 3.7  --  3.8   < > 4.1   CHLORIDE 98 100 105   < > 99  --  100   < > 103   CO2 25 25 28   < > 29  --  30   < > 28   BUN 20 18 18   < > 21  --  19   < > 26   CR 0.57 0.56 0.64   < > 0.57  --  0.51*   < > 0.64   ANIONGAP 8 6 3   < > 3  --  3   < > 3   ESTIVEN 8.7 8.6 8.7   < > 8.8  --  8.8   < > 8.5   * 144* 141*   < > 156*  --  83   < > 187*   ALBUMIN 2.4* 2.3*  --   --  2.8*  --   --    < > 3.0*   PROTTOTAL 5.9* 5.8*  --   --  6.7*  --   --    < > 6.3*   BILITOTAL 0.3 0.2  --   --  0.3  --   --    < > 0.4   ALKPHOS 78 70  --   --  74  --   --    < > 68   ALT 27 26  --   --  36  --   --    < > 39   AST 23 26  --   --  26  --   --    < > 28   TROPI  --   --   --   --  <0.015  --  <0.015  --  <0.015    < > = values in this interval not displayed.       Recent Results (from the past 24 hour(s))   XR Chest Port 1 View    Narrative    EXAM: XR CHEST PORT 1 VW  LOCATION: [facility]  DATE/TIME: 4/2/2021 5:42 AM    INDICATION: Respiratory failure after craniotomy.  COMPARISON: CTA chest 03/26/2021. Chest radiograph 03/31/2021.      Impression    IMPRESSION: Right PICC tip at the superior cavoatrial junction. There are persistent patchy infiltrates of both lungs consistent with infectious/inflammatory pneumonitis. No significant pleural effusion. No pneumothorax. Stable cardiac silhouette.       Doug Bob MD  Text Page  (7am to 6pm)

## 2021-04-02 NOTE — PROGRESS NOTES
[facility]    Infectious Disease Progress Note    Date of Service (when I saw the patient): 04/02/2021     Assessment & Plan   Olga Bailey is a 75 year old female who was admitted on 3/26/2021.     Impression:  1. 75 y.o with recent diagnosis of craniotomy and resection of glioblastoma multiforme.   2. Has been on very high dose steroid taper for the past month since the craniotomy. Not on any bactrim prophylaxis before admission.    3. Admitted with shortness of breath.   4. Found to have bilateral ground glass infiltrates on the imaging, very hypoxic.   5. COVID PCR negative x 2.   6. No leucocytosis, procal not elevated.      Recommendations:   High suspicion for PJP pneumonia. LDH high, high beta d glucan unable to confirm with the sputum studies as patient has been unable to provide any sputum   On zosyn ( will stop after 7  Days)  And on treatment dose for PJP as we wait on sputum cultures.   Steroids for hypoxia.   Get sputum for regular bacterial culture, PJP if possible when ever patient able to provide sputum ( labs have been ordered)   Discussed with FACUNDO Branham MD    Interval History   On bipap 55 l   No fever   No new micro   No new complaints       Physical Exam   Temp: 97.8  F (36.6  C) Temp src: Oral BP: 128/89 Pulse: 82   Resp: 23 SpO2: 95 % O2 Device: BiPAP/CPAP    Vitals:    03/30/21 0632 03/31/21 0600 04/01/21 0625   Weight: 68.7 kg (151 lb 8 oz) 68.3 kg (150 lb 9.6 oz) 68.1 kg (150 lb 2.1 oz)     Vital Signs with Ranges  Temp:  [97.8  F (36.6  C)] 97.8  F (36.6  C)  Pulse:  [78-93] 82  Resp:  [19-30] 23  BP: (115-139)/(70-89) 128/89  FiO2 (%):  [50 %] 50 %  SpO2:  [93 %-100 %] 95 %    Constitutional: Awake, alert  Lungs: diminished bilateral   Cardiovascular: Regular rate and rhythm, normal S1 and S2, and no murmur noted  Abdomen: Normal bowel sounds, soft, non-distended, non-tender  Skin: No rashes, no cyanosis, no edema  Other:    Medications      dextrose       dextrose       - MEDICATION INSTRUCTIONS -       parenteral nutrition - Clinimix E       parenteral nutrition - Clinimix E 40 mL/hr at 04/01/21 2359       albuterol  2.5 mg Nebulization Q4H While awake     amLODIPine  10 mg Oral Daily     busPIRone HCl  30 mg Oral BID     [Held by provider] doxycycline hyclate  100 mg Oral Q12H ELGIN     enoxaparin ANTICOAGULANT  70 mg Subcutaneous Q12H     famotidine  20 mg Intravenous Q12H     [Held by provider] famotidine  20 mg Oral BID     FLUoxetine  80 mg Oral Daily     insulin aspart  1-12 Units Subcutaneous Q4H     lipids  250 mL Intravenous Q24H     methylPREDNISolone  32 mg Intravenous Q12H     piperacillin-tazobactam  3.375 g Intravenous Q6H     [Held by provider] predniSONE  40 mg Oral BID w/meals     sodium chloride (PF)  3 mL Intracatheter Q8H     sulfamethoxazole-trimethoprim  320 mg Intravenous Q8H       Data   All microbiology laboratory data reviewed.  Recent Labs   Lab Test 04/02/21  0550 04/01/21  0500 03/31/21  0514   WBC 10.9 10.5 10.5   HGB 9.9* 9.8* 10.1*   HCT 30.4* 30.4* 30.3*   MCV 88 88 87    185 171     Recent Labs   Lab Test 04/02/21  0550 04/01/21  0500 03/31/21  0514   CR 0.57 0.56 0.64     No lab results found.  Recent Labs   Lab Test 03/29/21  1951 03/29/21  1946   CULT No growth after 4 days No growth after 4 days       Attestation:  Total time on the floor involved in the patient's care: 35 minutes. Total time spent in counseling/care coordination: >50%

## 2021-04-03 LAB
ANION GAP SERPL CALCULATED.3IONS-SCNC: 4 MMOL/L (ref 3–14)
BUN SERPL-MCNC: 26 MG/DL (ref 7–30)
CALCIUM SERPL-MCNC: 8.8 MG/DL (ref 8.5–10.1)
CHLORIDE SERPL-SCNC: 98 MMOL/L (ref 94–109)
CO2 SERPL-SCNC: 28 MMOL/L (ref 20–32)
CREAT SERPL-MCNC: 0.51 MG/DL (ref 0.52–1.04)
ERYTHROCYTE [DISTWIDTH] IN BLOOD BY AUTOMATED COUNT: 14.6 % (ref 10–15)
GFR SERPL CREATININE-BSD FRML MDRD: >90 ML/MIN/{1.73_M2}
GLUCOSE BLDC GLUCOMTR-MCNC: 128 MG/DL (ref 70–99)
GLUCOSE BLDC GLUCOMTR-MCNC: 133 MG/DL (ref 70–99)
GLUCOSE BLDC GLUCOMTR-MCNC: 155 MG/DL (ref 70–99)
GLUCOSE BLDC GLUCOMTR-MCNC: 173 MG/DL (ref 70–99)
GLUCOSE BLDC GLUCOMTR-MCNC: 177 MG/DL (ref 70–99)
GLUCOSE BLDC GLUCOMTR-MCNC: 179 MG/DL (ref 70–99)
GLUCOSE BLDC GLUCOMTR-MCNC: 188 MG/DL (ref 70–99)
GLUCOSE SERPL-MCNC: 163 MG/DL (ref 70–99)
HCT VFR BLD AUTO: 30.2 % (ref 35–47)
HGB BLD-MCNC: 9.8 G/DL (ref 11.7–15.7)
MAGNESIUM SERPL-MCNC: 2.3 MG/DL (ref 1.6–2.3)
MCH RBC QN AUTO: 28.7 PG (ref 26.5–33)
MCHC RBC AUTO-ENTMCNC: 32.5 G/DL (ref 31.5–36.5)
MCV RBC AUTO: 89 FL (ref 78–100)
PHOSPHATE SERPL-MCNC: 2.7 MG/DL (ref 2.5–4.5)
PLATELET # BLD AUTO: 205 10E9/L (ref 150–450)
POTASSIUM SERPL-SCNC: 4.7 MMOL/L (ref 3.4–5.3)
RBC # BLD AUTO: 3.41 10E12/L (ref 3.8–5.2)
SODIUM SERPL-SCNC: 130 MMOL/L (ref 133–144)
WBC # BLD AUTO: 12.1 10E9/L (ref 4–11)

## 2021-04-03 PROCEDURE — 250N000009 HC RX 250: Performed by: HOSPITALIST

## 2021-04-03 PROCEDURE — 94660 CPAP INITIATION&MGMT: CPT

## 2021-04-03 PROCEDURE — 94640 AIRWAY INHALATION TREATMENT: CPT | Mod: 76

## 2021-04-03 PROCEDURE — 250N000011 HC RX IP 250 OP 636: Performed by: INTERNAL MEDICINE

## 2021-04-03 PROCEDURE — 250N000011 HC RX IP 250 OP 636: Performed by: HOSPITALIST

## 2021-04-03 PROCEDURE — 250N000013 HC RX MED GY IP 250 OP 250 PS 637: Performed by: HOSPITALIST

## 2021-04-03 PROCEDURE — 83735 ASSAY OF MAGNESIUM: CPT | Performed by: INTERNAL MEDICINE

## 2021-04-03 PROCEDURE — 120N000001 HC R&B MED SURG/OB

## 2021-04-03 PROCEDURE — 120N000013 HC R&B IMCU

## 2021-04-03 PROCEDURE — 80048 BASIC METABOLIC PNL TOTAL CA: CPT | Performed by: INTERNAL MEDICINE

## 2021-04-03 PROCEDURE — 999N000040 HC STATISTIC CONSULT NO CHARGE VASC ACCESS

## 2021-04-03 PROCEDURE — 84100 ASSAY OF PHOSPHORUS: CPT | Performed by: INTERNAL MEDICINE

## 2021-04-03 PROCEDURE — 250N000011 HC RX IP 250 OP 636: Performed by: NURSE PRACTITIONER

## 2021-04-03 PROCEDURE — 250N000009 HC RX 250: Performed by: NURSE PRACTITIONER

## 2021-04-03 PROCEDURE — 99233 SBSQ HOSP IP/OBS HIGH 50: CPT | Performed by: INTERNAL MEDICINE

## 2021-04-03 PROCEDURE — 94640 AIRWAY INHALATION TREATMENT: CPT

## 2021-04-03 PROCEDURE — 250N000009 HC RX 250: Performed by: INTERNAL MEDICINE

## 2021-04-03 PROCEDURE — 85027 COMPLETE CBC AUTOMATED: CPT | Performed by: INTERNAL MEDICINE

## 2021-04-03 PROCEDURE — 999N001017 HC STATISTIC GLUCOSE BY METER IP

## 2021-04-03 PROCEDURE — 999N000157 HC STATISTIC RCP TIME EA 10 MIN

## 2021-04-03 PROCEDURE — 258N000003 HC RX IP 258 OP 636: Performed by: HOSPITALIST

## 2021-04-03 RX ADMIN — INSULIN ASPART 1 UNITS: 100 INJECTION, SOLUTION INTRAVENOUS; SUBCUTANEOUS at 17:51

## 2021-04-03 RX ADMIN — LORAZEPAM 0.5 MG: 2 INJECTION INTRAMUSCULAR; INTRAVENOUS at 23:17

## 2021-04-03 RX ADMIN — ASCORBIC ACID, VITAMIN A PALMITATE, CHOLECALCIFEROL, THIAMINE HYDROCHLORIDE, RIBOFLAVIN-5 PHOSPHATE SODIUM, PYRIDOXINE HYDROCHLORIDE, NIACINAMIDE, DEXPANTHENOL, ALPHA-TOCOPHEROL ACETATE, VITAMIN K1, FOLIC ACID, BIOTIN, CYANOCOBALAMIN: 200; 3300; 200; 6; 3.6; 6; 40; 15; 10; 150; 600; 60; 5 INJECTION, SOLUTION INTRAVENOUS at 12:50

## 2021-04-03 RX ADMIN — BUSPIRONE HYDROCHLORIDE 30 MG: 15 TABLET ORAL at 08:15

## 2021-04-03 RX ADMIN — PIPERACILLIN SODIUM AND TAZOBACTAM SODIUM 3.38 G: 3; .375 INJECTION, POWDER, LYOPHILIZED, FOR SOLUTION INTRAVENOUS at 20:27

## 2021-04-03 RX ADMIN — ENOXAPARIN SODIUM 70 MG: 80 INJECTION SUBCUTANEOUS at 23:08

## 2021-04-03 RX ADMIN — INSULIN ASPART 2 UNITS: 100 INJECTION, SOLUTION INTRAVENOUS; SUBCUTANEOUS at 05:26

## 2021-04-03 RX ADMIN — ALBUTEROL SULFATE 2.5 MG: 2.5 SOLUTION RESPIRATORY (INHALATION) at 07:50

## 2021-04-03 RX ADMIN — PIPERACILLIN SODIUM AND TAZOBACTAM SODIUM 3.38 G: 3; .375 INJECTION, POWDER, LYOPHILIZED, FOR SOLUTION INTRAVENOUS at 08:14

## 2021-04-03 RX ADMIN — FAMOTIDINE 20 MG: 10 INJECTION, SOLUTION INTRAVENOUS at 23:08

## 2021-04-03 RX ADMIN — PIPERACILLIN SODIUM AND TAZOBACTAM SODIUM 3.38 G: 3; .375 INJECTION, POWDER, LYOPHILIZED, FOR SOLUTION INTRAVENOUS at 00:17

## 2021-04-03 RX ADMIN — LORAZEPAM 0.5 MG: 2 INJECTION INTRAMUSCULAR; INTRAVENOUS at 19:03

## 2021-04-03 RX ADMIN — BUSPIRONE HYDROCHLORIDE 30 MG: 15 TABLET ORAL at 20:20

## 2021-04-03 RX ADMIN — I.V. FAT EMULSION 250 ML: 20 EMULSION INTRAVENOUS at 20:19

## 2021-04-03 RX ADMIN — FAMOTIDINE 20 MG: 10 INJECTION, SOLUTION INTRAVENOUS at 10:29

## 2021-04-03 RX ADMIN — INSULIN ASPART 2 UNITS: 100 INJECTION, SOLUTION INTRAVENOUS; SUBCUTANEOUS at 12:57

## 2021-04-03 RX ADMIN — AMLODIPINE BESYLATE 10 MG: 10 TABLET ORAL at 08:15

## 2021-04-03 RX ADMIN — PIPERACILLIN SODIUM AND TAZOBACTAM SODIUM 3.38 G: 3; .375 INJECTION, POWDER, LYOPHILIZED, FOR SOLUTION INTRAVENOUS at 14:09

## 2021-04-03 RX ADMIN — ALBUTEROL SULFATE 2.5 MG: 2.5 SOLUTION RESPIRATORY (INHALATION) at 11:05

## 2021-04-03 RX ADMIN — INSULIN ASPART 2 UNITS: 100 INJECTION, SOLUTION INTRAVENOUS; SUBCUTANEOUS at 08:18

## 2021-04-03 RX ADMIN — METHYLPREDNISOLONE SODIUM SUCCINATE 32 MG: 40 INJECTION, POWDER, FOR SOLUTION INTRAMUSCULAR; INTRAVENOUS at 23:08

## 2021-04-03 RX ADMIN — HYDROXYZINE HYDROCHLORIDE 50 MG: 25 TABLET, FILM COATED ORAL at 10:32

## 2021-04-03 RX ADMIN — INSULIN ASPART 2 UNITS: 100 INJECTION, SOLUTION INTRAVENOUS; SUBCUTANEOUS at 00:17

## 2021-04-03 RX ADMIN — SULFAMETHOXAZOLE AND TRIMETHOPRIM 320 MG: 80; 16 INJECTION, SOLUTION, CONCENTRATE INTRAVENOUS at 23:20

## 2021-04-03 RX ADMIN — I.V. FAT EMULSION 250 ML: 20 EMULSION INTRAVENOUS at 00:16

## 2021-04-03 RX ADMIN — ENOXAPARIN SODIUM 70 MG: 80 INJECTION SUBCUTANEOUS at 10:29

## 2021-04-03 RX ADMIN — FLUOXETINE 80 MG: 20 CAPSULE ORAL at 08:15

## 2021-04-03 RX ADMIN — METHYLPREDNISOLONE SODIUM SUCCINATE 32 MG: 40 INJECTION, POWDER, FOR SOLUTION INTRAMUSCULAR; INTRAVENOUS at 10:29

## 2021-04-03 RX ADMIN — ACETAMINOPHEN 650 MG: 325 TABLET, FILM COATED ORAL at 10:32

## 2021-04-03 RX ADMIN — ALBUTEROL SULFATE 2.5 MG: 2.5 SOLUTION RESPIRATORY (INHALATION) at 23:58

## 2021-04-03 RX ADMIN — SULFAMETHOXAZOLE AND TRIMETHOPRIM 320 MG: 80; 16 INJECTION, SOLUTION, CONCENTRATE INTRAVENOUS at 05:15

## 2021-04-03 RX ADMIN — ALBUTEROL SULFATE 2.5 MG: 2.5 SOLUTION RESPIRATORY (INHALATION) at 15:26

## 2021-04-03 RX ADMIN — ONDANSETRON 4 MG: 2 INJECTION INTRAMUSCULAR; INTRAVENOUS at 18:45

## 2021-04-03 RX ADMIN — SULFAMETHOXAZOLE AND TRIMETHOPRIM 320 MG: 80; 16 INJECTION, SOLUTION, CONCENTRATE INTRAVENOUS at 14:09

## 2021-04-03 ASSESSMENT — ACTIVITIES OF DAILY LIVING (ADL)
ADLS_ACUITY_SCORE: 18
ADLS_ACUITY_SCORE: 18
ADLS_ACUITY_SCORE: 23
ADLS_ACUITY_SCORE: 23
ADLS_ACUITY_SCORE: 17
ADLS_ACUITY_SCORE: 18

## 2021-04-03 NOTE — PROVIDER NOTIFICATION
Paged Dr Bob of pt having frequency/urgency of urination. Patient wondering if she has a UTI?    [id number] Dr Bob put in orders

## 2021-04-03 NOTE — PROGRESS NOTES
Pt stable on Bipap overnight. SpO2 98%, RR 14-20. No issues. Skin protective barriers in place, Bipap alarm level set at 10. Will continue to follow.

## 2021-04-03 NOTE — PROGRESS NOTES
"PULMONOLOGY PROGRESS NOTE    Date of Admission: 3/26/2021    CC/Reason for Hospital visit: Follow up PJP pneumonia  SUBJECTIVE      Pleasant lady former patient of Dr Starkey for asthma in past years has had glioblastoma resected and subsequently had a prolonged course of steroids; she presented with hypoxemic resp failure and bilateral ground glass infiltrates and is responding to Rx for PJP and empiric antibiotic Zosyn. Course complicated by a DVT.   She notes cough after eating supine in bed and would like to be up more. Still requires high FiO2. Was just up in chair this am but in bed for breakfast. Overall, somewhat better today.         ROS: A Problem Pertinent review of systems was negative except for items noted in HPI.  Past Medical, Family, and Social/Substance History has been reviewed: No interval changes.   OBJECTIVE   Vital signs:  Temp: 98.3  F (36.8  C) Temp src: Oral BP: 133/69 Pulse: 89   Resp: 25 SpO2: 94 % O2 Device: High Flow Nasal Cannula (HFNC) Oxygen Delivery: 45 LPM Height: 160 cm (5' 3\") Weight: 68.1 kg (150 lb 2.1 oz)  Estimated body mass index is 26.59 kg/m  as calculated from the following:    Height as of this encounter: 1.6 m (5' 3\").    Weight as of this encounter: 68.1 kg (150 lb 2.1 oz).        I/O last 3 completed shifts:  In: 3837.85 [P.O.:400; I.V.:2010]  Out: 1450 [Urine:1450]      CONSTITUTIONAL/GENERAL APPEARANCE: Alert pleasant lady nad ox3 . No Apparent Distress but throat clearing and coughing a lot during interview, supine in bed at 45', and has food scattered all over her chest and breakfast tray 80% gone in front of her. .  PSYCHIATRIC: Pleasant and appropriate mood and affect. Oriented x 3.  EARS, NOSE,THROAT,MOUTH: External ears and nose overall normal. Normal oral mucosa.   NECK: Neck appearance normal. No neck masses and the thyroid is not enlarged.   RESPIRATORY: Non-labored effort. Decreased bilaterally. No wheeze or rhonchi.   CARDIOVASCULAR: S1, S2, regular rate " and rhythm,        LABORATORY ASSESSMENT    Arterial Blood Gas  Recent Labs   Lab 04/02/21  0615 03/31/21  1440 03/29/21  2235 03/29/21  1825   PH 7.49* 7.49* 7.51* 7.51*   PCO2 35 34* 35 33*   PO2 83 61* 67* 57*   HCO3 26 25 28 26   O2PER 50% 60 50  --      CBC  Recent Labs   Lab 04/03/21  0527 04/02/21  0550 04/01/21  0500 03/31/21  0514   WBC 12.1* 10.9 10.5 10.5   RBC 3.41* 3.46* 3.46* 3.47*   HGB 9.8* 9.9* 9.8* 10.1*   HCT 30.2* 30.4* 30.4* 30.3*   MCV 89 88 88 87   MCH 28.7 28.6 28.3 29.1   MCHC 32.5 32.6 32.2 33.3   RDW 14.6 14.7 14.6 14.6    206 185 171     BMP  Recent Labs   Lab 04/03/21  0527 04/02/21  0550 04/01/21  0500 03/31/21  0514   * 131* 131* 136   POTASSIUM 4.7 4.4 4.4 4.2   CHLORIDE 98 98 100 105   ESTIVEN 8.8 8.7 8.6 8.7   CO2 28 25 25 28   BUN 26 20 18 18   CR 0.51* 0.57 0.56 0.64   * 181* 144* 141*     INR  Recent Labs   Lab 04/02/21  0550   INR 1.00      BNPNo lab results found in last 7 days.  VENOUS BLOOD GASESNo lab results found in last 7 days.        Additional labs and/or comments:     Results for RADHA RYAN (MRN [id number]) as of 4/3/2021 10:41   Ref. Range 8/15/2007 15:50 3/29/2021 19:46 3/29/2021 19:47 3/29/2021 19:51 3/30/2021 06:05   1,3-BETA D GLUCAN FUNGITELL Unknown   Rpt (A)     Aspergillus Galact AG Latest Ref Range: Negative      Negative     ASPERGILLUS GALACTOMANNAN ANTIGEN Unknown   Rpt     Aspergillus Galactomannan Index Latest Units:      0.15     B-D GLUCAN INTERPRETATION (1,3) Latest Ref Range: Negative      Positive (A)     BLOOD CULTURE Unknown  Rpt  Rpt    Culture Micro Unknown  No growth after 5 days  No growth after 5 days    LEGIONELLA ANTIGEN, URINE (LEGIONELLA PNEUMOPHILA URINE ANTIGEN) Unknown     Rpt   Specimen Description Unknown Vagina Blood Left Arm  Blood Right Arm Urine   Results for RADHA RYAN (MRN [id number]) as of 4/3/2021 10:41   Ref. Range 3/29/2021 18:25 3/29/2021 22:35 3/31/2021 14:40 4/2/2021 06:15   pH Arterial  Latest Ref Range: 7.35 - 7.45 pH 7.51 (H) 7.51 (H) 7.49 (H) 7.49 (H)   pCO2 Arterial Latest Ref Range: 35 - 45 mm Hg 33 (L) 35 34 (L) 35   PO2 Arterial Latest Ref Range: 80 - 105 mm Hg 57 (L) 67 (L) 61 (L) 83   Bicarbonate Arterial Latest Ref Range: 21 - 28 mmol/L 26 28 25 26   Base Excess Art Latest Units: mmol/L 3.4 5.1 2.4 2.7   FIO2 Unknown  50 60 50%   Oxyhemoglobin Arterial Latest Ref Range: 92 - 100 % 90 (L) 93 91 (L) 97     Results for RADHA RYAN (MRN [id number]) as of 4/3/2021 10:41   Ref. Range 3/30/2021 05:02 3/31/2021 05:14 4/1/2021 05:00 4/2/2021 05:50 4/3/2021 05:27   WBC Latest Ref Range: 4.0 - 11.0 10e9/L 9.8 10.5 10.5 10.9 12.1 (H)   Hemoglobin Latest Ref Range: 11.7 - 15.7 g/dL 10.7 (L) 10.1 (L) 9.8 (L) 9.9 (L) 9.8 (L)   Hematocrit Latest Ref Range: 35.0 - 47.0 % 32.5 (L) 30.3 (L) 30.4 (L) 30.4 (L) 30.2 (L)   Platelet Count Latest Ref Range: 150 - 450 10e9/L 157 171 185 206 205   RBC Count Latest Ref Range: 3.8 - 5.2 10e12/L 3.66 (L) 3.47 (L) 3.46 (L) 3.46 (L) 3.41 (L)   MCV Latest Ref Range: 78 - 100 fl 89 87 88 88 89   MCH Latest Ref Range: 26.5 - 33.0 pg 29.2 29.1 28.3 28.6 28.7   MCHC Latest Ref Range: 31.5 - 36.5 g/dL 32.9 33.3 32.2 32.6 32.5   RDW Latest Ref Range: 10.0 - 15.0 % 14.6 14.6 14.6 14.7 14.6       IMAGING      Radiology Results (last 7 days)    Procedure Component Value Units Date/Time   XR Chest Port 1 View [[id number]] Collected: 04/02/21 0542   Order Status: Completed Updated: 04/02/21 0602   Narrative:     EXAM: XR CHEST PORT 1 VW   LOCATION: [facility]   DATE/TIME: 4/2/2021 5:42 AM     INDICATION: Respiratory failure after craniotomy.   COMPARISON: CTA chest 03/26/2021. Chest radiograph 03/31/2021.    Impression:     IMPRESSION: Right PICC tip at the superior cavoatrial junction. There are persistent patchy infiltrates of both lungs consistent with infectious/inflammatory pneumonitis. No significant pleural effusion. No pneumothorax. Stable cardiac  silhouette.   XR Chest Port 1 View [[id number]] Resulted: 21 141   Order Status: Completed Updated: 21   Narrative:     XR CHEST PORT 1 VW 3/31/2021 2:04 PM     HISTORY: resp faiure     COMPARISON: 3/29/2021    Impression:     IMPRESSION: Slight progression in left basilar patchy infiltrate or   atelectasis with otherwise stable multifocal airspace opacities in   both lungs. No pleural effusion or pneumothorax. The cardiac and   mediastinal silhouettes are normal. Instrumented fusion in the   cervical spine.     ELIANA PALACIOS MD   XR Chest Port 1 View [[id number]] Resulted: 21   Order Status: Completed Updated: 21   Narrative:     XR PORTABLE CHEST ONE VIEW   3/29/2021 6:29 PM     HISTORY: Respiratory distress     COMPARISON: Chest x-ray on same day earlier    Impression:     IMPRESSION: Single portable AP view of the chest was obtained. Stable   cardiomediastinal silhouette. Bilateral patchy air space pulmonary   opacities, worrisome for infection. No significant pleural effusion or   pneumothorax.     SHARON QUINTANA MD   Echocardiogram Complete [[id number]] Collected: 21 1343   Order Status: Completed Updated: 21 1605   Narrative:     [id number]   [facility]   [id number]   [id number]^YESENIA^NATHALY      [facility]   Echocardiography Laboratory   [address] [place] [address]   [address]   [place] [address]   Phone [phone]   Fax [phone]      Name: RADHA RYAN   MRN: [id number]   : 1945   Study Date: 2021 01:43 PM   Age: 75 yrs   Gender: Female   Patient [facility]: [facility]   Reason For Study: Dyspnea   Ordering Physician: NATHALY PATTERSON   Referring Physician: [facility] [place]   Performed By: Darcie Mina      BSA: 1.7 m2   Height: 63 in   Weight: 151 lb   HR: 73   BP: 135/83 mmHg   ______________________________________________________________________________   Procedure   Complete Echo Adult.  Optison (NDC #[id number]) given intravenously.      ______________________________________________________________________________   Interpretation Summary      Left ventricular systolic function is normal.   The visual ejection fraction is estimated at 50-55%.   Nonspecific abnormal septal motion.   Mildly decreased right ventricular systolic function   The right ventricle is mildly dilated.   PA systolic pressure is 30 mmHg which is normal, however low PV acceleration   time may suggest pulmonary hypertension.   The inferior vena cava was normal in size with preserved respiratory   variability.   There is no pericardial effusion.      No prior for comparison.   ______________________________________________________________________________   Left Ventricle   The left ventricle is normal in size. There is mild concentric left   ventricular hypertrophy. The visual ejection fraction is estimated at 50-55%.   Left ventricular systolic function is normal. Left ventricular diastolic   function is indeterminate. Normal left ventricular wall motion.      Right Ventricle   The right ventricle is mildly dilated. Mildly decreased right ventricular   systolic function.      Atria   Normal left atrial size. Right atrial size is normal.      Mitral Valve   The mitral valve is normal in structure and function. There is mild (1+)   mitral regurgitation.      Tricuspid Valve   The tricuspid valve is normal in structure and function. The right ventricular   systolic pressure is approximated at 26mmHg plus the right atrial pressure.   There is trace tricuspid regurgitation.      Aortic Valve   The aortic valve is normal in structure and function.      Pulmonic Valve   The pulmonic valve is normal in structure and function.      Vessels   The aortic root is normal size. Normal size ascending aorta. The inferior vena   cava was normal in size with preserved respiratory variability.      Pericardium   There is no pericardial effusion.       ______________________________________________________________________________   MMode/2D Measurements & Calculations   IVSd: 1.1 cm   LVIDd: 3.7 cm   LVIDs: 2.9 cm   LVPWd: 0.93 cm   FS: 21.3 %   LV mass(C)d: 113.7 grams   LV mass(C)dI: 66.3 grams/m2   Ao root diam: 3.4 cm   asc Aorta Diam: 3.3 cm   LVOT diam: 2.0 cm   LVOT area: 3.1 cm2   RWT: 0.50      TAPSE: 2.4 cm      Doppler Measurements & Calculations   MV E max julissa: 50.0 cm/sec   MV A max julissa: 70.3 cm/sec   MV E/A: 0.71   MV dec slope: 165.0 cm/sec2   LV V1 max P.2 mmHg   LV V1 max: 114.0 cm/sec   LV V1 VTI: 22.2 cm   SV(LVOT): 69.7 ml   SI(LVOT): 40.6 ml/m2   PA acc time: 0.06 sec   PI end-d julissa: 142.0 cm/sec   TR max julissa: 257.0 cm/sec   TR max P.4 mmHg   E/E' av.5   Lateral E/e': 4.9   Medial E/e': 6.1      ______________________________________________________________________________   Report approved by: Severo Del Rio 2021 04:02 PM          XR Chest 2 Views [[id number]] Resulted: 21 130   Order Status: Completed Updated: 21   Narrative:     XR CHEST 2 VW   3/29/2021 1:02 PM        INDICATION: increased hypoxia, reassess infiltrates/edema   COMPARISON: 3/26/2021    Impression:     IMPRESSION: Bilateral interstitial infiltrates have increased when   compared to previous. No pleural effusion or pneumothorax. Heart size   within normal limits.     BIA MARI MD    Lower Extremity Venous Duplex Bilateral [[id number]] Resulted: 21 1200   Order Status: Completed Updated: 21 1201   Narrative:     VENOUS ULTRASOUND BOTH LEGS  3/29/2021 11:52 AM     HISTORY: Hypoxia, recent surgery, leg swelling.     COMPARISON: None.     FINDINGS:  Examination of the deep veins with graded compression and   color flow Doppler with spectral wave form analysis was performed.      Right lower extremity: There is nearly occlusive to occlusive DVT in   the right posterior tibial veins extending from the proximal to mid    calf. There is occlusive DVT in a right soleal vein. Remaining deep   veins of the right lower extremity are patent.     Left lower extremity: There is nearly occlusive to occlusive thrombus   in the left posterior tibial veins and peroneal veins extending from   the proximal to mid calf. There is occlusive thrombus in a left soleal   vein. Remaining deep veins of the left lower extremity are patent.     There is a probable Baker's cyst in the left popliteal fossa measuring   5.9 x 4.0 x 1.9 cm.    Impression:     IMPRESSION: Bilateral lower extremity DVT as above.     JESSICA WAGGONER MD       PFT & OTHER TESTING     n/a  ASSESSMENT / PLAN        ASSESSMENT :   Pulmonary Diagnoses:  Atypical pneumonia, probable PJP; patchy infiltrates on CXR 4/2 persist  Immune compromised due to post op steroids  DVT  Hx asthma, stable, mild intermitant  Hypoxemia due to PJP; requires high FiO2 although some improvement and she is feeling better but weak and sob.  Aspiration risk      Overall, improving.      PLAN:     Continue to titrate FiO2 down as she improves    CXR Monday; CT in a month (ordered)    Continue anti-infectives as advised by ID    Get up to chair for all meals; I think she may aspirate and was trying to clear her throat after breakfast in bed supine today.     PT/OT when tolerated    iv medrol 32 mg q 12h; decrease per ID advice for the PJP.     I discussed with her plan of care and we will check on her progress Monday or Tuesday when Dr Jaime is rounding. I anticipate she will be here for another week. Perhaps to TCU end of this coming week.     CXR yesterday. Repeat Monday. May need to repeat imaging with CT in the next few weeks and will follow to resolution    Suggest follow up in our Clinic 2-3 weeks after discharge. She lives in [place] and may come to see me after the CT scan.     Dr Jaime is rounding Monday-Wed this week.     Please call me any time.      KRGromerMD  [phone]  [phone]              Olga Bailey (Legal)     75 year old, 1945    Legal sex: Female    Marital Status: Single    Employer: [employer]    MRN: [id number]    CSN: [id number]    JOVANNY: [id number]     Bed: 3305 / 3305-01    [address] [place] [id number]    [phone] (Mobile)    [phone] (Home Phone)   none (Work Phone)

## 2021-04-03 NOTE — PLAN OF CARE
A&O. VSS on bipap overnight. Tele: NSR. LS diminished, tachypneic at times. Ativan given x1 for anxiety. TPN rate increased to 60ml/hr, lipids infusing. Denies pain. Indepenent with bed mobility. Incontinent, purwick inplace with adequate output, no skin break down. Plan: continue with antibiotics and pulmonary hygiene.

## 2021-04-03 NOTE — PROGRESS NOTES
IMC: A&Ox4. Pleasant. A-1. Up in chair for about 2-hrs. IS to 750. Denies pain. Lungs diminished. No wheezing. Slightly JACOBSEN. Off BIPAP and on HFNC since morning. TPN infusing @ 60 ml/hr. Tolerating regular diet as well. No N/V/D. VSS. Tele: NSR. Chest CT ordered.

## 2021-04-03 NOTE — PROGRESS NOTES
[facility]  Hospitalist Progress Note  Doug Bob MD  04/03/2021    Assessment & Plan   Olga Bailey is a 75 year old female admitted on 3/26/2021.  past medical history that is most notable for recent craniotomy and resection of glioblastoma multiforme, as well as uncomplicated asthma, who presents with dyspnea and is found to have acute bilateral pneumonia.     Acute hypoxic respiratory failure due to bilateral pneumonia, suspected PJP  * Presents with progressive dyspnea.  Afebrile without leukocytosis but left shift and lymphopenia noted on differential.  Admission CT showing bilateral patchy groundglass opacities, negative for PE.  Initially treated with ceftriaxone and doxy.   * COVID19 negative 3/26 and 3/29.    * Procal low 3/28 and 3/29  * Increased oxygen needs 3/29 (ultimately requiring Bipap) with repeat CXR showing increase in bilateral patchy opacities  * Repeat COVID, procal, trop, BNP negative, resp viral panel negative, EKG unremarkable on 3/29  * TTE 3/29 with EF 50-55% without WMA, mildly decreased RV systolic function with mild dilation.  * Found to have DVT 3/29 but with normal hemodynamics, normal trop and BNP, no significant RV strain on echo and risk for renal injury with contrast and initiation of high dose Bactrim, did not pursue CT imaging given very low concern for saddle embolus with need for thrombectomy.  * ID consulted 3/29, suspect PJP with prolonged steroid course following neurosurgery (see below).  * Switched from ceftriaxone to zosyn 3/29 and initiated on high dose Bactrim and increased steroids  * 1, 3 beta glucan and fungitell or positive and likely indicative of PJP  - currently on zosyn, doxy and high dose Bactrim, anticipate discontinuing zosyn soon  - continue methylpred 32 mg IV bid (converted from prednisone 40 mg bid)  - albuterol q4h while awake  - stable on Bipap and high flow alternating  - pulmonary consultation to help manage  bipap, high flow as able, noted improvement in oxygenation.  - aspergillus and aspergillus Ag negative  - now able to tolerate high flow and being able to be off bipap  - continue steroid and bactrim 5/21 doses    Bilateral LE DVT  US 3/29 showing occlusive DVT of bilateral posterior tibial veins and soleal veins to mid calf.  - continue on lovenox      Urinary incontinence  - started with TPN  - UA negative  - has pure wick     Hypokalemia  - K protocol     GBM s/p resection 2/23/21  Followed by Dr. Baker of Neuro-Oncology.  She is in process of being set up to start chemotherapy and XRT next week.   Appears to have been on prolonged steroids following neurosurgery as was discharged without plan for taper and initiated taper only recently in follow up with Dr. Baker.  - was down to decadron 2mg daily per taper;   discontinued decadron with initiation of high dose steroid for PJP as above   - has not yet initiated chemo (temozolomide) and planned radiation.     Hypertension  - continue PTA amlodipine     Asthma  - albuterol as above     Depression and anxiety  Insomnia  - continue PTA Prozac and Buspar  - continue PTA Atarax for sleep  - prn lorazepam     GERD  - continue PTA Pepcid     Chronic anemia  Admission hgb 11.4 consistent with recent baseline.      Malnutrition  - noted decrease oral intake and drop in albumin  - PICC line in place  - continue TPN    Diet: Snacks/Supplements Adult: Boost Shake; Between Meals  continue TPN  DVT Prophylaxis: lovenox  Martino Catheter: not present  Code Status: Full Code         Disposition Plan  >  5 days       Interval History   -- improved oxygenation on high flow  -- being able to tolerate on high flow for longer periods  -- discussed with  RN and daughter at bedside      -Data reviewed today: I reviewed all new labs and imaging over the last 24 hours. I personally reviewed no images or EKG's today.    Physical Exam    , Blood pressure 135/76, pulse 102, temperature 98.3  F  "(36.8  C), temperature source Oral, resp. rate 12, height 1.6 m (5' 3\"), weight 68.1 kg (150 lb 2.1 oz), SpO2 91 %.  Vitals:    03/30/21 0632 03/31/21 0600 04/01/21 0625   Weight: 68.7 kg (151 lb 8 oz) 68.3 kg (150 lb 9.6 oz) 68.1 kg (150 lb 2.1 oz)     Vital Signs with Ranges  Temp:  [98.3  F (36.8  C)-98.5  F (36.9  C)] 98.3  F (36.8  C)  Pulse:  [] 102  Resp:  [12-33] 12  BP: (115-150)/() 135/76  FiO2 (%):  [50 %-90 %] 75 %  SpO2:  [90 %-99 %] 91 %  I/O's Last 24 hours  I/O last 3 completed shifts:  In: 3837.85 [P.O.:400; I.V.:2010]  Out: 1450 [Urine:1450]    Constitutional: Awake, alert, cooperative, no apparent distress, on high flow  Respiratory: few crackles  Cardiovascular: Regular rate and rhythm, normal S1 and S2, and no murmur noted  GI: Normal bowel sounds, soft, non-distended, non-tender  Skin/Integumen: No rashes, no cyanosis, no edema  Other:      Medications   All medications were reviewed.    dextrose       dextrose       - MEDICATION INSTRUCTIONS -       parenteral nutrition - Clinimix E 60 mL/hr at 04/03/21 1250       albuterol  2.5 mg Nebulization Q4H While awake     amLODIPine  10 mg Oral Daily     busPIRone HCl  30 mg Oral BID     [Held by provider] doxycycline hyclate  100 mg Oral Q12H ELGIN     enoxaparin ANTICOAGULANT  70 mg Subcutaneous Q12H     famotidine  20 mg Intravenous Q12H     [Held by provider] famotidine  20 mg Oral BID     FLUoxetine  80 mg Oral Daily     insulin aspart  1-12 Units Subcutaneous Q4H     lipids  250 mL Intravenous Q24H     methylPREDNISolone  32 mg Intravenous Q12H     piperacillin-tazobactam  3.375 g Intravenous Q6H     [Held by provider] predniSONE  40 mg Oral BID w/meals     sodium chloride (PF)  3 mL Intracatheter Q8H     sulfamethoxazole-trimethoprim  320 mg Intravenous Q8H        Data   Recent Labs   Lab 04/03/21  0527 04/02/21  0550 04/01/21  0500 03/29/21  1947 03/29/21  1947 03/29/21  0820 03/29/21  0820   WBC 12.1* 10.9 10.5   < >  --    < > 9.2 "   HGB 9.8* 9.9* 9.8*   < >  --    < > 11.2*   MCV 89 88 88   < >  --    < > 88    206 185   < >  --    < > 171   INR  --  1.00  --   --   --   --   --    * 131* 131*   < > 131*  --  133   POTASSIUM 4.7 4.4 4.4   < > 3.7  --  3.8   CHLORIDE 98 98 100   < > 99  --  100   CO2 28 25 25   < > 29  --  30   BUN 26 20 18   < > 21  --  19   CR 0.51* 0.57 0.56   < > 0.57  --  0.51*   ANIONGAP 4 8 6   < > 3  --  3   ESTIVEN 8.8 8.7 8.6   < > 8.8  --  8.8   * 181* 144*   < > 156*  --  83   ALBUMIN  --  2.4* 2.3*  --  2.8*   < >  --    PROTTOTAL  --  5.9* 5.8*  --  6.7*   < >  --    BILITOTAL  --  0.3 0.2  --  0.3   < >  --    ALKPHOS  --  78 70  --  74   < >  --    ALT  --  27 26  --  36   < >  --    AST  --  23 26  --  26   < >  --    TROPI  --   --   --   --  <0.015  --  <0.015    < > = values in this interval not displayed.       No results found for this or any previous visit (from the past 24 hour(s)).    Doug Bob MD  Text Page  (7am to 6pm)

## 2021-04-03 NOTE — PROGRESS NOTES
[facility]    Infectious Disease Progress Note    Date of Service (when I saw the patient): 04/03/2021     Assessment & Plan   Olga Bailey is a 75 year old female who was admitted on 3/26/2021.     Impression:  1. 75 y.o with recent diagnosis of craniotomy and resection of glioblastoma multiforme.   2. Has been on very high dose steroid taper for the past month since the craniotomy. Not on any bactrim prophylaxis before admission.    3. Admitted with shortness of breath.   4. Found to have bilateral ground glass infiltrates on the imaging, very hypoxic.   5. COVID PCR negative x 2.   6. No leucocytosis, procal not elevated.      Recommendations:   High suspicion for PJP pneumonia. LDH high, high beta d glucan unable to confirm with the sputum studies as patient has been unable to provide any sputum   On zosyn ( will stop after 7  Days)  And on treatment dose for PJP as we wait on sputum cultures.   Steroids for PJP, taper as able   Get sputum for regular bacterial culture, PJP if possible when ever patient able to provide sputum ( labs have been ordered)   Day 5/21 septra slow improvement to be expected       Donny Sanabria MD    Interval History   On bipap high flow  No fever   No new micro   No new complaints       Physical Exam   Temp: 98.3  F (36.8  C) Temp src: Oral BP: 133/69 Pulse: 89   Resp: 25 SpO2: 94 % O2 Device: High Flow Nasal Cannula (HFNC) Oxygen Delivery: 45 LPM  Vitals:    03/30/21 0632 03/31/21 0600 04/01/21 0625   Weight: 68.7 kg (151 lb 8 oz) 68.3 kg (150 lb 9.6 oz) 68.1 kg (150 lb 2.1 oz)     Vital Signs with Ranges  Temp:  [98.3  F (36.8  C)-98.5  F (36.9  C)] 98.3  F (36.8  C)  Pulse:  [74-96] 89  Resp:  [13-33] 25  BP: (115-150)/() 133/69  FiO2 (%):  [50 %-90 %] 90 %  SpO2:  [90 %-99 %] 94 %    Constitutional: Awake, alert  Lungs: diminished bilateral   Cardiovascular: Regular rate and rhythm, normal S1 and S2, and no murmur noted  Abdomen: Normal bowel  sounds, soft, non-distended, non-tender  Skin: No rashes, no cyanosis, no edema  Other:    Medications     dextrose       dextrose       - MEDICATION INSTRUCTIONS -       parenteral nutrition - Clinimix E 60 mL/hr at 04/02/21 2303       albuterol  2.5 mg Nebulization Q4H While awake     amLODIPine  10 mg Oral Daily     busPIRone HCl  30 mg Oral BID     [Held by provider] doxycycline hyclate  100 mg Oral Q12H ELGIN     enoxaparin ANTICOAGULANT  70 mg Subcutaneous Q12H     famotidine  20 mg Intravenous Q12H     [Held by provider] famotidine  20 mg Oral BID     FLUoxetine  80 mg Oral Daily     insulin aspart  1-12 Units Subcutaneous Q4H     lipids  250 mL Intravenous Q24H     methylPREDNISolone  32 mg Intravenous Q12H     piperacillin-tazobactam  3.375 g Intravenous Q6H     [Held by provider] predniSONE  40 mg Oral BID w/meals     sodium chloride (PF)  3 mL Intracatheter Q8H     sulfamethoxazole-trimethoprim  320 mg Intravenous Q8H       Data   All microbiology laboratory data reviewed.  Recent Labs   Lab Test 04/03/21  0527 04/02/21  0550 04/01/21  0500   WBC 12.1* 10.9 10.5   HGB 9.8* 9.9* 9.8*   HCT 30.2* 30.4* 30.4*   MCV 89 88 88    206 185     Recent Labs   Lab Test 04/03/21  0527 04/02/21  0550 04/01/21  0500   CR 0.51* 0.57 0.56     No lab results found.  Recent Labs   Lab Test 03/29/21  1951 03/29/21  1946   CULT No growth after 5 days No growth after 5 days       Attestation:  Total time on the floor involved in the patient's care: 35 minutes. Total time spent in counseling/care coordination: >50%

## 2021-04-03 NOTE — PLAN OF CARE
[facility]C. A&Ox4. VSS. Transitioned from bipap to high flow NC, 45L at 80%. Denies SOB. LS diminished. BS active, passing flatus. Denies pain. Ativan given x1 for anxiety. Uses bedpan and purewick in place. Tolerated regular diet this evening. Denies nausea. PICC on right arm with TPN at 40mls/hr.

## 2021-04-04 LAB
ANION GAP SERPL CALCULATED.3IONS-SCNC: 5 MMOL/L (ref 3–14)
ANION GAP SERPL CALCULATED.3IONS-SCNC: 6 MMOL/L (ref 3–14)
BACTERIA SPEC CULT: NO GROWTH
BACTERIA SPEC CULT: NO GROWTH
BASE EXCESS BLDA CALC-SCNC: 3.6 MMOL/L
BASOPHILS # BLD AUTO: 0 10E9/L (ref 0–0.2)
BASOPHILS NFR BLD AUTO: 0 %
BUN SERPL-MCNC: 22 MG/DL (ref 7–30)
BUN SERPL-MCNC: 22 MG/DL (ref 7–30)
CALCIUM SERPL-MCNC: 8.5 MG/DL (ref 8.5–10.1)
CALCIUM SERPL-MCNC: 8.8 MG/DL (ref 8.5–10.1)
CHLORIDE SERPL-SCNC: 98 MMOL/L (ref 94–109)
CHLORIDE SERPL-SCNC: 99 MMOL/L (ref 94–109)
CO2 SERPL-SCNC: 27 MMOL/L (ref 20–32)
CO2 SERPL-SCNC: 28 MMOL/L (ref 20–32)
CREAT SERPL-MCNC: 0.51 MG/DL (ref 0.52–1.04)
CREAT SERPL-MCNC: 0.55 MG/DL (ref 0.52–1.04)
DIFFERENTIAL METHOD BLD: ABNORMAL
EOSINOPHIL # BLD AUTO: 0 10E9/L (ref 0–0.7)
EOSINOPHIL NFR BLD AUTO: 0 %
ERYTHROCYTE [DISTWIDTH] IN BLOOD BY AUTOMATED COUNT: 14.4 % (ref 10–15)
GFR SERPL CREATININE-BSD FRML MDRD: >90 ML/MIN/{1.73_M2}
GFR SERPL CREATININE-BSD FRML MDRD: >90 ML/MIN/{1.73_M2}
GLUCOSE BLDC GLUCOMTR-MCNC: 146 MG/DL (ref 70–99)
GLUCOSE BLDC GLUCOMTR-MCNC: 183 MG/DL (ref 70–99)
GLUCOSE BLDC GLUCOMTR-MCNC: 192 MG/DL (ref 70–99)
GLUCOSE BLDC GLUCOMTR-MCNC: 214 MG/DL (ref 70–99)
GLUCOSE BLDC GLUCOMTR-MCNC: 214 MG/DL (ref 70–99)
GLUCOSE BLDC GLUCOMTR-MCNC: 244 MG/DL (ref 70–99)
GLUCOSE BLDC GLUCOMTR-MCNC: 247 MG/DL (ref 70–99)
GLUCOSE SERPL-MCNC: 138 MG/DL (ref 70–99)
GLUCOSE SERPL-MCNC: 183 MG/DL (ref 70–99)
HCO3 BLD-SCNC: 28 MMOL/L (ref 21–28)
HCT VFR BLD AUTO: 31.2 % (ref 35–47)
HGB BLD-MCNC: 10.2 G/DL (ref 11.7–15.7)
LACTATE BLD-SCNC: 1.9 MMOL/L (ref 0.7–2)
LYMPHOCYTES # BLD AUTO: 0.1 10E9/L (ref 0.8–5.3)
LYMPHOCYTES NFR BLD AUTO: 1 %
MAGNESIUM SERPL-MCNC: 2.3 MG/DL (ref 1.6–2.3)
MCH RBC QN AUTO: 29 PG (ref 26.5–33)
MCHC RBC AUTO-ENTMCNC: 32.7 G/DL (ref 31.5–36.5)
MCV RBC AUTO: 89 FL (ref 78–100)
METAMYELOCYTES # BLD: 0.1 10E9/L
METAMYELOCYTES NFR BLD MANUAL: 1 %
MONOCYTES # BLD AUTO: 0.1 10E9/L (ref 0–1.3)
MONOCYTES NFR BLD AUTO: 1 %
MYELOCYTES # BLD: 0.3 10E9/L
MYELOCYTES NFR BLD MANUAL: 2 %
NEUTROPHILS # BLD AUTO: 12.1 10E9/L (ref 1.6–8.3)
NEUTROPHILS NFR BLD AUTO: 95 %
O2/TOTAL GAS SETTING VFR VENT: ABNORMAL %
OVALOCYTES BLD QL SMEAR: SLIGHT
OXYHGB MFR BLD: 95 % (ref 92–100)
PCO2 BLD: 40 MM HG (ref 35–45)
PH BLD: 7.45 PH (ref 7.35–7.45)
PHOSPHATE SERPL-MCNC: 2.5 MG/DL (ref 2.5–4.5)
PLATELET # BLD AUTO: 223 10E9/L (ref 150–450)
PLATELET # BLD EST: ABNORMAL 10*3/UL
PO2 BLD: 74 MM HG (ref 80–105)
POTASSIUM SERPL-SCNC: 4.5 MMOL/L (ref 3.4–5.3)
POTASSIUM SERPL-SCNC: 4.6 MMOL/L (ref 3.4–5.3)
RBC # BLD AUTO: 3.52 10E12/L (ref 3.8–5.2)
SODIUM SERPL-SCNC: 131 MMOL/L (ref 133–144)
SODIUM SERPL-SCNC: 132 MMOL/L (ref 133–144)
SPECIMEN SOURCE: NORMAL
SPECIMEN SOURCE: NORMAL
WBC # BLD AUTO: 12.7 10E9/L (ref 4–11)

## 2021-04-04 PROCEDURE — 250N000009 HC RX 250: Performed by: HOSPITALIST

## 2021-04-04 PROCEDURE — 999N001017 HC STATISTIC GLUCOSE BY METER IP

## 2021-04-04 PROCEDURE — 83605 ASSAY OF LACTIC ACID: CPT | Performed by: INTERNAL MEDICINE

## 2021-04-04 PROCEDURE — 83735 ASSAY OF MAGNESIUM: CPT | Performed by: INTERNAL MEDICINE

## 2021-04-04 PROCEDURE — 999N000157 HC STATISTIC RCP TIME EA 10 MIN

## 2021-04-04 PROCEDURE — 999N000190 HC STATISTIC VAT ROUNDS

## 2021-04-04 PROCEDURE — 84100 ASSAY OF PHOSPHORUS: CPT | Performed by: INTERNAL MEDICINE

## 2021-04-04 PROCEDURE — 250N000013 HC RX MED GY IP 250 OP 250 PS 637: Performed by: HOSPITALIST

## 2021-04-04 PROCEDURE — 94660 CPAP INITIATION&MGMT: CPT

## 2021-04-04 PROCEDURE — 258N000003 HC RX IP 258 OP 636: Performed by: HOSPITALIST

## 2021-04-04 PROCEDURE — 80048 BASIC METABOLIC PNL TOTAL CA: CPT | Performed by: INTERNAL MEDICINE

## 2021-04-04 PROCEDURE — 250N000011 HC RX IP 250 OP 636: Performed by: NURSE PRACTITIONER

## 2021-04-04 PROCEDURE — 82805 BLOOD GASES W/O2 SATURATION: CPT | Performed by: INTERNAL MEDICINE

## 2021-04-04 PROCEDURE — 99232 SBSQ HOSP IP/OBS MODERATE 35: CPT | Performed by: INTERNAL MEDICINE

## 2021-04-04 PROCEDURE — 250N000011 HC RX IP 250 OP 636: Performed by: INTERNAL MEDICINE

## 2021-04-04 PROCEDURE — 85025 COMPLETE CBC W/AUTO DIFF WBC: CPT | Performed by: INTERNAL MEDICINE

## 2021-04-04 PROCEDURE — 250N000009 HC RX 250: Performed by: INTERNAL MEDICINE

## 2021-04-04 PROCEDURE — 94640 AIRWAY INHALATION TREATMENT: CPT

## 2021-04-04 PROCEDURE — 250N000011 HC RX IP 250 OP 636: Performed by: HOSPITALIST

## 2021-04-04 PROCEDURE — 120N000013 HC R&B IMCU

## 2021-04-04 PROCEDURE — 250N000013 HC RX MED GY IP 250 OP 250 PS 637: Performed by: INTERNAL MEDICINE

## 2021-04-04 PROCEDURE — 94640 AIRWAY INHALATION TREATMENT: CPT | Mod: 76

## 2021-04-04 PROCEDURE — 120N000001 HC R&B MED SURG/OB

## 2021-04-04 RX ORDER — FAMOTIDINE 20 MG/1
20 TABLET, FILM COATED ORAL 2 TIMES DAILY
Status: DISCONTINUED | OUTPATIENT
Start: 2021-04-04 | End: 2021-04-14

## 2021-04-04 RX ADMIN — INSULIN ASPART 5 UNITS: 100 INJECTION, SOLUTION INTRAVENOUS; SUBCUTANEOUS at 16:38

## 2021-04-04 RX ADMIN — FAMOTIDINE 20 MG: 20 TABLET ORAL at 10:03

## 2021-04-04 RX ADMIN — INSULIN ASPART 3 UNITS: 100 INJECTION, SOLUTION INTRAVENOUS; SUBCUTANEOUS at 05:18

## 2021-04-04 RX ADMIN — SULFAMETHOXAZOLE AND TRIMETHOPRIM 320 MG: 80; 16 INJECTION, SOLUTION, CONCENTRATE INTRAVENOUS at 23:04

## 2021-04-04 RX ADMIN — ENOXAPARIN SODIUM 70 MG: 80 INJECTION SUBCUTANEOUS at 10:06

## 2021-04-04 RX ADMIN — INSULIN ASPART 5 UNITS: 100 INJECTION, SOLUTION INTRAVENOUS; SUBCUTANEOUS at 20:48

## 2021-04-04 RX ADMIN — I.V. FAT EMULSION 250 ML: 20 EMULSION INTRAVENOUS at 22:34

## 2021-04-04 RX ADMIN — ASCORBIC ACID, VITAMIN A PALMITATE, CHOLECALCIFEROL, THIAMINE HYDROCHLORIDE, RIBOFLAVIN-5 PHOSPHATE SODIUM, PYRIDOXINE HYDROCHLORIDE, NIACINAMIDE, DEXPANTHENOL, ALPHA-TOCOPHEROL ACETATE, VITAMIN K1, FOLIC ACID, BIOTIN, CYANOCOBALAMIN: 200; 3300; 200; 6; 3.6; 6; 40; 15; 10; 150; 600; 60; 5 INJECTION, SOLUTION INTRAVENOUS at 22:34

## 2021-04-04 RX ADMIN — METHYLPREDNISOLONE SODIUM SUCCINATE 32 MG: 40 INJECTION, POWDER, FOR SOLUTION INTRAMUSCULAR; INTRAVENOUS at 10:05

## 2021-04-04 RX ADMIN — PIPERACILLIN SODIUM AND TAZOBACTAM SODIUM 3.38 G: 3; .375 INJECTION, POWDER, LYOPHILIZED, FOR SOLUTION INTRAVENOUS at 09:58

## 2021-04-04 RX ADMIN — ALBUTEROL SULFATE 2.5 MG: 2.5 SOLUTION RESPIRATORY (INHALATION) at 07:43

## 2021-04-04 RX ADMIN — INSULIN ASPART 3 UNITS: 100 INJECTION, SOLUTION INTRAVENOUS; SUBCUTANEOUS at 12:14

## 2021-04-04 RX ADMIN — SULFAMETHOXAZOLE AND TRIMETHOPRIM 320 MG: 80; 16 INJECTION, SOLUTION, CONCENTRATE INTRAVENOUS at 16:41

## 2021-04-04 RX ADMIN — PIPERACILLIN SODIUM AND TAZOBACTAM SODIUM 3.38 G: 3; .375 INJECTION, POWDER, LYOPHILIZED, FOR SOLUTION INTRAVENOUS at 03:46

## 2021-04-04 RX ADMIN — INSULIN ASPART 3 UNITS: 100 INJECTION, SOLUTION INTRAVENOUS; SUBCUTANEOUS at 23:02

## 2021-04-04 RX ADMIN — ALBUTEROL SULFATE 2.5 MG: 2.5 SOLUTION RESPIRATORY (INHALATION) at 20:12

## 2021-04-04 RX ADMIN — ENOXAPARIN SODIUM 70 MG: 80 INJECTION SUBCUTANEOUS at 20:43

## 2021-04-04 RX ADMIN — ALBUTEROL SULFATE 2.5 MG: 2.5 SOLUTION RESPIRATORY (INHALATION) at 12:29

## 2021-04-04 RX ADMIN — ALBUTEROL SULFATE 2.5 MG: 2.5 SOLUTION RESPIRATORY (INHALATION) at 15:49

## 2021-04-04 RX ADMIN — BUSPIRONE HYDROCHLORIDE 30 MG: 15 TABLET ORAL at 10:03

## 2021-04-04 RX ADMIN — FLUOXETINE 80 MG: 20 CAPSULE ORAL at 10:03

## 2021-04-04 RX ADMIN — ALBUTEROL SULFATE 2.5 MG: 2.5 SOLUTION RESPIRATORY (INHALATION) at 23:52

## 2021-04-04 RX ADMIN — AMLODIPINE BESYLATE 10 MG: 10 TABLET ORAL at 10:03

## 2021-04-04 RX ADMIN — LORAZEPAM 0.5 MG: 2 INJECTION INTRAMUSCULAR; INTRAVENOUS at 22:55

## 2021-04-04 RX ADMIN — METHYLPREDNISOLONE SODIUM SUCCINATE 32 MG: 40 INJECTION, POWDER, FOR SOLUTION INTRAMUSCULAR; INTRAVENOUS at 22:34

## 2021-04-04 RX ADMIN — SULFAMETHOXAZOLE AND TRIMETHOPRIM 320 MG: 80; 16 INJECTION, SOLUTION, CONCENTRATE INTRAVENOUS at 09:58

## 2021-04-04 ASSESSMENT — ACTIVITIES OF DAILY LIVING (ADL)
ADLS_ACUITY_SCORE: 24
ADLS_ACUITY_SCORE: 24
ADLS_ACUITY_SCORE: 22
ADLS_ACUITY_SCORE: 24
ADLS_ACUITY_SCORE: 24
ADLS_ACUITY_SCORE: 22

## 2021-04-04 NOTE — PROGRESS NOTES
[facility]    Infectious Disease Progress Note    Date of Service (when I saw the patient): 04/04/2021     Assessment & Plan   Olga Bailey is a 75 year old female who was admitted on 3/26/2021.     Impression:  1. 75 y.o with recent diagnosis of craniotomy and resection of glioblastoma multiforme.   2. Has been on very high dose steroid taper for the past month since the craniotomy. Not on any bactrim prophylaxis before admission.    3. Admitted with shortness of breath.   4. Found to have bilateral ground glass infiltrates on the imaging, very hypoxic.   5. COVID PCR negative x 2.   6. No leucocytosis, procal not elevated.      Recommendations:   High suspicion for PJP pneumonia. LDH high, high beta d glucan unable to confirm with the sputum studies as patient has been unable to provide any sputum   DC zosyn )  And on treatment dose for PJP as we wait on sputum cultures.   Steroids for PJP, taper as able   Get sputum for regular bacterial culture, PJP if possible when ever patient able to provide sputum ( labs have been ordered)   Day 6/21 septra slow improvement to be expected       Donny Sanabria MD    Interval History   On  high flow comfortable  No fever   No new micro   No new complaints       Physical Exam   Temp: 98.1  F (36.7  C) Temp src: Oral BP: 121/78 Pulse: 110   Resp: 30 SpO2: 97 % O2 Device: High Flow Nasal Cannula (HFNC) Oxygen Delivery: 40 LPM  Vitals:    03/30/21 0632 03/31/21 0600 04/01/21 0625   Weight: 68.7 kg (151 lb 8 oz) 68.3 kg (150 lb 9.6 oz) 68.1 kg (150 lb 2.1 oz)     Vital Signs with Ranges  Temp:  [98.1  F (36.7  C)-98.3  F (36.8  C)] 98.1  F (36.7  C)  Pulse:  [] 110  Resp:  [9-38] 30  BP: (103-141)/() 121/78  FiO2 (%):  [40 %-82 %] 82 %  SpO2:  [86 %-99 %] 97 %    Constitutional: Awake, alert  Lungs: diminished bilateral   Cardiovascular: Regular rate and rhythm, normal S1 and S2, and no murmur noted  Abdomen: Normal bowel sounds, soft,  non-distended, non-tender  Skin: No rashes, no cyanosis, no edema  Other:    Medications     dextrose       dextrose       - MEDICATION INSTRUCTIONS -       parenteral nutrition - Clinimix E 60 mL/hr at 04/03/21 1250       albuterol  2.5 mg Nebulization Q4H While awake     amLODIPine  10 mg Oral Daily     busPIRone HCl  30 mg Oral BID     [Held by provider] doxycycline hyclate  100 mg Oral Q12H ELGIN     enoxaparin ANTICOAGULANT  70 mg Subcutaneous Q12H     famotidine  20 mg Oral BID     FLUoxetine  80 mg Oral Daily     insulin aspart  1-12 Units Subcutaneous Q4H     lipids  250 mL Intravenous Q24H     methylPREDNISolone  32 mg Intravenous Q12H     [Held by provider] predniSONE  40 mg Oral BID w/meals     sodium chloride (PF)  3 mL Intracatheter Q8H     sulfamethoxazole-trimethoprim  320 mg Intravenous Q8H       Data   All microbiology laboratory data reviewed.  Recent Labs   Lab Test 04/04/21  0811 04/03/21  0527 04/02/21  0550   WBC 12.7* 12.1* 10.9   HGB 10.2* 9.8* 9.9*   HCT 31.2* 30.2* 30.4*   MCV 89 89 88    205 206     Recent Labs   Lab Test 04/04/21  0811 04/04/21  0518 04/03/21  0527   CR 0.51* 0.55 0.51*     No lab results found.  Recent Labs   Lab Test 03/29/21 1951 03/29/21  1946   CULT No growth No growth       Attestation:  Total time on the floor involved in the patient's care: 35 minutes. Total time spent in counseling/care coordination: >50%

## 2021-04-04 NOTE — PLAN OF CARE
A&O. VSS on bipap overnight. Tele: ST/SR. LS diminished, tachypneic at times. Ativan given x1 for anxiety. TPN and lipids infusing. Denies pain. Indepenent with bed mobility. Incontinent at times, purwick inplace with adequate output, no skin break down. Denies pain or nauesa. Plan: continue with antibiotics and pulmonary hygiene, weaning O2 as able

## 2021-04-04 NOTE — PLAN OF CARE
Pt is tolerating HFNC all day, she is on 82% Fio2 at 40L. Sat's have been ranging from low to mid 90's. Pt tends to desat's with activity. Crackles present posteriorly, cough is weak, non productive. Pulmonary hygiene promoted. Appetite is good, eating 70% of meal. TPN infusing, blood glucose slightly elevated. Pt had a BM today, incontinent of urine, pure wick in place. Pt denies pain.

## 2021-04-04 NOTE — PROGRESS NOTES
[facility]  Hospitalist Progress Note  Doug Bob MD  04/04/2021    Assessment & Plan   Olga Bailey is a 75 year old female admitted on 3/26/2021.  past medical history that is most notable for recent craniotomy and resection of glioblastoma multiforme, as well as uncomplicated asthma, who presents with dyspnea and is found to have acute bilateral pneumonia.     Acute hypoxic respiratory failure due to bilateral pneumonia, suspected PJP  * Presents with progressive dyspnea.  Afebrile without leukocytosis but left shift and lymphopenia noted on differential.  Admission CT showing bilateral patchy groundglass opacities, negative for PE.  Initially treated with ceftriaxone and doxy.   * COVID19 negative 3/26 and 3/29.    * Procal low 3/28 and 3/29  * Increased oxygen needs 3/29 (ultimately requiring Bipap) with repeat CXR showing increase in bilateral patchy opacities  * Repeat COVID, procal, trop, BNP negative, resp viral panel negative, EKG unremarkable on 3/29  * TTE 3/29 with EF 50-55% without WMA, mildly decreased RV systolic function with mild dilation.  * Found to have DVT 3/29 but with normal hemodynamics, normal trop and BNP, no significant RV strain on echo and risk for renal injury with contrast and initiation of high dose Bactrim, did not pursue CT imaging given very low concern for saddle embolus with need for thrombectomy.  * ID consulted 3/29, suspect PJP with prolonged steroid course following neurosurgery (see below).  * Switched from ceftriaxone to zosyn 3/29 and initiated on high dose Bactrim and increased steroids  * 1, 3 beta glucan and fungitell or positive and likely indicative of PJP  - currently on zosyn, doxy and high dose Bactrim, anticipate discontinuing zosyn soon  - continue methylpred 32 mg IV bid (converted from prednisone 40 mg bid)  - albuterol q4h while awake  - stable on Bipap and high flow alternating  - pulmonary consultation to help manage  bipap, high flow as able, noted improvement in oxygenation.  - aspergillus and aspergillus Ag negative  - now able to tolerate high flow and being able to be off bipap  - continue steroid and bactrim 6/21 doses    Bilateral LE DVT  US 3/29 showing occlusive DVT of bilateral posterior tibial veins and soleal veins to mid calf.  - continue on lovenox      Urinary incontinence  - contineu TPN  - UA negative  - continue pure wick     Hypokalemia  - K protocol     GBM s/p resection 2/23/21  Followed by Dr. Baker of Neuro-Oncology.  She is in process of being set up to start chemotherapy and XRT next week.   Appears to have been on prolonged steroids following neurosurgery as was discharged without plan for taper and initiated taper only recently in follow up with Dr. Baker.  - was down to decadron 2mg daily per taper;   discontinued decadron with initiation of high dose steroid for PJP as above   - has not yet initiated chemo (temozolomide) and planned radiation.     Hypertension  - continue PTA amlodipine     Asthma  - albuterol as above     Depression and anxiety  Insomnia  - continue PTA Prozac and Buspar  - continue PTA Atarax for sleep  - prn lorazepam     GERD  - continue PTA Pepcid     Chronic anemia  Admission hgb 11.4 consistent with recent baseline.      Malnutrition  - noted decrease oral intake and drop in albumin  - PICC line in place  - continue TPN    Diet: Snacks/Supplements Adult: Boost Shake; Between Meals  continue TPN  DVT Prophylaxis: lovenox  Martino Catheter: not present  Code Status: Full Code         Disposition Plan  >  5 days  - patient will need to be off high flow       Interval History   -- improved oxygenation on high flow  -- being able to tolerate on high flow for longer periods  -- one episode of vomiting yesterday, none today      -Data reviewed today: I reviewed all new labs and imaging over the last 24 hours. I personally reviewed no images or EKG's today.    Physical Exam    , Blood  "pressure 135/88, pulse 98, temperature 98.6  F (37  C), temperature source Oral, resp. rate 25, height 1.6 m (5' 3\"), weight 68.1 kg (150 lb 2.1 oz), SpO2 99 %.  Vitals:    03/30/21 0632 03/31/21 0600 04/01/21 0625   Weight: 68.7 kg (151 lb 8 oz) 68.3 kg (150 lb 9.6 oz) 68.1 kg (150 lb 2.1 oz)     Vital Signs with Ranges  Temp:  [98.1  F (36.7  C)-98.6  F (37  C)] 98.6  F (37  C)  Pulse:  [] 98  Resp:  [9-38] 25  BP: (103-141)/() 135/88  FiO2 (%):  [40 %-82 %] 82 %  SpO2:  [86 %-99 %] 99 %  I/O's Last 24 hours  I/O last 3 completed shifts:  In: 2390 [P.O.:720]  Out: 2100 [Urine:1900; Emesis/NG output:200]    Constitutional: Awake, alert, cooperative, no apparent distress, on high flow  Respiratory: few crackles  Cardiovascular: Regular rate and rhythm, normal S1 and S2, and no murmur noted  GI: Normal bowel sounds, soft, non-distended, non-tender  Skin/Integumen: No rashes, no cyanosis, no edema  Other:      Medications   All medications were reviewed.    dextrose       dextrose       - MEDICATION INSTRUCTIONS -       parenteral nutrition - Clinimix E 60 mL/hr at 04/03/21 1250       albuterol  2.5 mg Nebulization Q4H While awake     amLODIPine  10 mg Oral Daily     busPIRone HCl  30 mg Oral BID     [Held by provider] doxycycline hyclate  100 mg Oral Q12H ELGIN     enoxaparin ANTICOAGULANT  70 mg Subcutaneous Q12H     famotidine  20 mg Oral BID     FLUoxetine  80 mg Oral Daily     insulin aspart  1-12 Units Subcutaneous Q4H     lipids  250 mL Intravenous Q24H     methylPREDNISolone  32 mg Intravenous Q12H     [Held by provider] predniSONE  40 mg Oral BID w/meals     sodium chloride (PF)  3 mL Intracatheter Q8H     sulfamethoxazole-trimethoprim  320 mg Intravenous Q8H        Data   Recent Labs   Lab 04/04/21  0811 04/04/21  0518 04/03/21  0527 04/02/21  0550 04/01/21  0500 03/29/21  1947 03/29/21  1947 03/29/21  0820 03/29/21  0820   WBC 12.7*  --  12.1* 10.9 10.5   < >  --    < > 9.2   HGB 10.2*  --  9.8* " 9.9* 9.8*   < >  --    < > 11.2*   MCV 89  --  89 88 88   < >  --    < > 88     --  205 206 185   < >  --    < > 171   INR  --   --   --  1.00  --   --   --   --   --    * 131* 130* 131* 131*   < > 131*  --  133   POTASSIUM 4.5 4.6 4.7 4.4 4.4   < > 3.7  --  3.8   CHLORIDE 99 98 98 98 100   < > 99  --  100   CO2 28 27 28 25 25   < > 29  --  30   BUN 22 22 26 20 18   < > 21  --  19   CR 0.51* 0.55 0.51* 0.57 0.56   < > 0.57  --  0.51*   ANIONGAP 5 6 4 8 6   < > 3  --  3   ESTIVEN 8.8 8.5 8.8 8.7 8.6   < > 8.8  --  8.8   * 183* 163* 181* 144*   < > 156*  --  83   ALBUMIN  --   --   --  2.4* 2.3*  --  2.8*   < >  --    PROTTOTAL  --   --   --  5.9* 5.8*  --  6.7*   < >  --    BILITOTAL  --   --   --  0.3 0.2  --  0.3   < >  --    ALKPHOS  --   --   --  78 70  --  74   < >  --    ALT  --   --   --  27 26  --  36   < >  --    AST  --   --   --  23 26  --  26   < >  --    TROPI  --   --   --   --   --   --  <0.015  --  <0.015    < > = values in this interval not displayed.       No results found for this or any previous visit (from the past 24 hour(s)).    Doug Bob MD  Text Page  (7am to 6pm)

## 2021-04-04 NOTE — PLAN OF CARE
[facility] Status. A/Ox4. VSS ex tachypnic and tachycardic and high flow oxygen. JACOBSEN. Tele: ST. Up Ax1, GB - should order walker tomorrow. TPN @ 60 mL/hr. PICC x3 - WNL. Utilizing pure wick when in bed. . Poorly tolerating a regular diet, poor appetite. Emesis x1 - compazine given. Ativan given for anxiety. Plan pending. Will continue to monitor.

## 2021-04-05 ENCOUNTER — APPOINTMENT (OUTPATIENT)
Dept: GENERAL RADIOLOGY | Facility: CLINIC | Age: 76
DRG: 166 | End: 2021-04-05
Attending: INTERNAL MEDICINE
Payer: MEDICARE

## 2021-04-05 LAB
ALBUMIN SERPL-MCNC: 2.3 G/DL (ref 3.4–5)
ALP SERPL-CCNC: 90 U/L (ref 40–150)
ALT SERPL W P-5'-P-CCNC: 28 U/L (ref 0–50)
ANION GAP SERPL CALCULATED.3IONS-SCNC: 6 MMOL/L (ref 3–14)
AST SERPL W P-5'-P-CCNC: 28 U/L (ref 0–45)
BILIRUB SERPL-MCNC: 0.3 MG/DL (ref 0.2–1.3)
BUN SERPL-MCNC: 25 MG/DL (ref 7–30)
CALCIUM SERPL-MCNC: 8.7 MG/DL (ref 8.5–10.1)
CHLORIDE SERPL-SCNC: 98 MMOL/L (ref 94–109)
CO2 SERPL-SCNC: 26 MMOL/L (ref 20–32)
CREAT SERPL-MCNC: 0.5 MG/DL (ref 0.52–1.04)
ERYTHROCYTE [DISTWIDTH] IN BLOOD BY AUTOMATED COUNT: 14.3 % (ref 10–15)
GFR SERPL CREATININE-BSD FRML MDRD: >90 ML/MIN/{1.73_M2}
GLUCOSE BLDC GLUCOMTR-MCNC: 136 MG/DL (ref 70–99)
GLUCOSE BLDC GLUCOMTR-MCNC: 171 MG/DL (ref 70–99)
GLUCOSE BLDC GLUCOMTR-MCNC: 204 MG/DL (ref 70–99)
GLUCOSE BLDC GLUCOMTR-MCNC: 213 MG/DL (ref 70–99)
GLUCOSE BLDC GLUCOMTR-MCNC: 221 MG/DL (ref 70–99)
GLUCOSE BLDC GLUCOMTR-MCNC: 237 MG/DL (ref 70–99)
GLUCOSE SERPL-MCNC: 165 MG/DL (ref 70–99)
HCT VFR BLD AUTO: 28.1 % (ref 35–47)
HGB BLD-MCNC: 9.1 G/DL (ref 11.7–15.7)
INR PPP: 1.02 (ref 0.86–1.14)
LABORATORY COMMENT REPORT: NORMAL
LACTATE BLD-SCNC: 2.4 MMOL/L (ref 0.7–2)
LACTATE BLD-SCNC: 2.7 MMOL/L (ref 0.7–2)
MAGNESIUM SERPL-MCNC: 2 MG/DL (ref 1.6–2.3)
MCH RBC QN AUTO: 28.6 PG (ref 26.5–33)
MCHC RBC AUTO-ENTMCNC: 32.4 G/DL (ref 31.5–36.5)
MCV RBC AUTO: 88 FL (ref 78–100)
PHOSPHATE SERPL-MCNC: 2.2 MG/DL (ref 2.5–4.5)
PLATELET # BLD AUTO: 214 10E9/L (ref 150–450)
POTASSIUM SERPL-SCNC: 4.7 MMOL/L (ref 3.4–5.3)
PREALB SERPL IA-MCNC: 22 MG/DL (ref 15–45)
PROT SERPL-MCNC: 5.8 G/DL (ref 6.8–8.8)
RBC # BLD AUTO: 3.18 10E12/L (ref 3.8–5.2)
SARS-COV-2 RNA RESP QL NAA+PROBE: NEGATIVE
SODIUM SERPL-SCNC: 130 MMOL/L (ref 133–144)
SPECIMEN SOURCE: NORMAL
WBC # BLD AUTO: 15.4 10E9/L (ref 4–11)

## 2021-04-05 PROCEDURE — 80053 COMPREHEN METABOLIC PANEL: CPT | Performed by: INTERNAL MEDICINE

## 2021-04-05 PROCEDURE — 120N000001 HC R&B MED SURG/OB

## 2021-04-05 PROCEDURE — 250N000009 HC RX 250: Performed by: HOSPITALIST

## 2021-04-05 PROCEDURE — 250N000013 HC RX MED GY IP 250 OP 250 PS 637: Performed by: HOSPITALIST

## 2021-04-05 PROCEDURE — 999N001017 HC STATISTIC GLUCOSE BY METER IP

## 2021-04-05 PROCEDURE — 94660 CPAP INITIATION&MGMT: CPT

## 2021-04-05 PROCEDURE — 85610 PROTHROMBIN TIME: CPT | Performed by: INTERNAL MEDICINE

## 2021-04-05 PROCEDURE — 250N000012 HC RX MED GY IP 250 OP 636 PS 637: Performed by: HOSPITALIST

## 2021-04-05 PROCEDURE — 87635 SARS-COV-2 COVID-19 AMP PRB: CPT | Performed by: HOSPITALIST

## 2021-04-05 PROCEDURE — 250N000011 HC RX IP 250 OP 636: Performed by: HOSPITALIST

## 2021-04-05 PROCEDURE — 120N000013 HC R&B IMCU

## 2021-04-05 PROCEDURE — 84134 ASSAY OF PREALBUMIN: CPT | Performed by: INTERNAL MEDICINE

## 2021-04-05 PROCEDURE — 83735 ASSAY OF MAGNESIUM: CPT | Performed by: INTERNAL MEDICINE

## 2021-04-05 PROCEDURE — 999N000157 HC STATISTIC RCP TIME EA 10 MIN

## 2021-04-05 PROCEDURE — 250N000009 HC RX 250: Performed by: INTERNAL MEDICINE

## 2021-04-05 PROCEDURE — 250N000013 HC RX MED GY IP 250 OP 250 PS 637: Performed by: INTERNAL MEDICINE

## 2021-04-05 PROCEDURE — 85027 COMPLETE CBC AUTOMATED: CPT | Performed by: INTERNAL MEDICINE

## 2021-04-05 PROCEDURE — 84100 ASSAY OF PHOSPHORUS: CPT | Performed by: INTERNAL MEDICINE

## 2021-04-05 PROCEDURE — 71045 X-RAY EXAM CHEST 1 VIEW: CPT

## 2021-04-05 PROCEDURE — 999N000190 HC STATISTIC VAT ROUNDS

## 2021-04-05 PROCEDURE — 83605 ASSAY OF LACTIC ACID: CPT | Performed by: HOSPITALIST

## 2021-04-05 PROCEDURE — 250N000011 HC RX IP 250 OP 636: Performed by: INTERNAL MEDICINE

## 2021-04-05 PROCEDURE — 99233 SBSQ HOSP IP/OBS HIGH 50: CPT | Performed by: HOSPITALIST

## 2021-04-05 PROCEDURE — 94640 AIRWAY INHALATION TREATMENT: CPT

## 2021-04-05 PROCEDURE — 94640 AIRWAY INHALATION TREATMENT: CPT | Mod: 76

## 2021-04-05 PROCEDURE — 258N000003 HC RX IP 258 OP 636: Performed by: HOSPITALIST

## 2021-04-05 RX ORDER — PREDNISONE 20 MG/1
40 TABLET ORAL 2 TIMES DAILY WITH MEALS
Status: DISCONTINUED | OUTPATIENT
Start: 2021-04-05 | End: 2021-04-12

## 2021-04-05 RX ADMIN — POTASSIUM & SODIUM PHOSPHATES POWDER PACK 280-160-250 MG 1 PACKET: 280-160-250 PACK at 09:30

## 2021-04-05 RX ADMIN — ALBUTEROL SULFATE 2.5 MG: 2.5 SOLUTION RESPIRATORY (INHALATION) at 12:33

## 2021-04-05 RX ADMIN — LORAZEPAM 0.5 MG: 2 INJECTION INTRAMUSCULAR; INTRAVENOUS at 20:14

## 2021-04-05 RX ADMIN — I.V. FAT EMULSION 250 ML: 20 EMULSION INTRAVENOUS at 20:08

## 2021-04-05 RX ADMIN — BUSPIRONE HYDROCHLORIDE 30 MG: 15 TABLET ORAL at 20:13

## 2021-04-05 RX ADMIN — POTASSIUM & SODIUM PHOSPHATES POWDER PACK 280-160-250 MG 1 PACKET: 280-160-250 PACK at 17:03

## 2021-04-05 RX ADMIN — INSULIN ASPART 3 UNITS: 100 INJECTION, SOLUTION INTRAVENOUS; SUBCUTANEOUS at 05:49

## 2021-04-05 RX ADMIN — INSULIN ASPART 2 UNITS: 100 INJECTION, SOLUTION INTRAVENOUS; SUBCUTANEOUS at 13:17

## 2021-04-05 RX ADMIN — ASCORBIC ACID, VITAMIN A PALMITATE, CHOLECALCIFEROL, THIAMINE HYDROCHLORIDE, RIBOFLAVIN-5 PHOSPHATE SODIUM, PYRIDOXINE HYDROCHLORIDE, NIACINAMIDE, DEXPANTHENOL, ALPHA-TOCOPHEROL ACETATE, VITAMIN K1, FOLIC ACID, BIOTIN, CYANOCOBALAMIN: 200; 3300; 200; 6; 3.6; 6; 40; 15; 10; 150; 600; 60; 5 INJECTION, SOLUTION INTRAVENOUS at 20:07

## 2021-04-05 RX ADMIN — INSULIN ASPART 4 UNITS: 100 INJECTION, SOLUTION INTRAVENOUS; SUBCUTANEOUS at 20:19

## 2021-04-05 RX ADMIN — FAMOTIDINE 20 MG: 20 TABLET ORAL at 09:30

## 2021-04-05 RX ADMIN — FAMOTIDINE 20 MG: 20 TABLET ORAL at 20:13

## 2021-04-05 RX ADMIN — SULFAMETHOXAZOLE AND TRIMETHOPRIM 320 MG: 80; 16 INJECTION, SOLUTION, CONCENTRATE INTRAVENOUS at 17:03

## 2021-04-05 RX ADMIN — ALBUTEROL SULFATE 2.5 MG: 2.5 SOLUTION RESPIRATORY (INHALATION) at 19:21

## 2021-04-05 RX ADMIN — ALBUTEROL SULFATE 2.5 MG: 2.5 SOLUTION RESPIRATORY (INHALATION) at 08:33

## 2021-04-05 RX ADMIN — PREDNISONE 40 MG: 20 TABLET ORAL at 10:44

## 2021-04-05 RX ADMIN — ENOXAPARIN SODIUM 70 MG: 80 INJECTION SUBCUTANEOUS at 22:51

## 2021-04-05 RX ADMIN — ENOXAPARIN SODIUM 70 MG: 80 INJECTION SUBCUTANEOUS at 09:30

## 2021-04-05 RX ADMIN — FLUOXETINE 80 MG: 20 CAPSULE ORAL at 09:30

## 2021-04-05 RX ADMIN — AMLODIPINE BESYLATE 10 MG: 10 TABLET ORAL at 09:30

## 2021-04-05 RX ADMIN — ALBUTEROL SULFATE 2.5 MG: 2.5 SOLUTION RESPIRATORY (INHALATION) at 15:52

## 2021-04-05 RX ADMIN — BUSPIRONE HYDROCHLORIDE 30 MG: 15 TABLET ORAL at 09:30

## 2021-04-05 RX ADMIN — PREDNISONE 40 MG: 20 TABLET ORAL at 17:03

## 2021-04-05 RX ADMIN — SULFAMETHOXAZOLE AND TRIMETHOPRIM 320 MG: 80; 16 INJECTION, SOLUTION, CONCENTRATE INTRAVENOUS at 09:29

## 2021-04-05 ASSESSMENT — ACTIVITIES OF DAILY LIVING (ADL)
ADLS_ACUITY_SCORE: 22
ADLS_ACUITY_SCORE: 19
ADLS_ACUITY_SCORE: 22
ADLS_ACUITY_SCORE: 22

## 2021-04-05 ASSESSMENT — MIFFLIN-ST. JEOR: SCORE: 1164.48

## 2021-04-05 NOTE — PROGRESS NOTES
CLINICAL NUTRITION SERVICES - REASSESSMENT NOTE      RECOMMENDATIONS FOR MD/PROVIDER TO ORDER: Recommend consider TF (when FT placement would be feasible) as pt has a functional gut.    Recommendations Ordered by Registered Dietitian (RD): Continue Boost Shakes BID.  Order Chocolate Magic Cup with Dinner   Room Service Assist   Future/Additional Recommendations:   If TF employed, recommend eventual goal TF Isosource 1.5 at 45 mL/hr = 1620 kcals (29 kcal/kg), 73 gm pro (1.3 gm/kg), 820 mL H20, 190 gm CHO, 16 gm fiber   MALNUTRITION: 4/5  % Weight Loss:  None noted  % Intake:  </= 50% for >/= 5 days (severe malnutrition)   Subcutaneous Fat Loss: None noted  Muscle Loss: Mild clavicle wasting, mild deltoid wasting, mild dorsal wasting.  Fluid Retention: +1 BLE      Malnutrition Diagnosis: Non Severe Malnutrition in the context of acute on chronic illness.     EVALUATION OF PROGRESS TOWARD GOALS   Diet: Regular diet + Boost Shakes BID    Nutrition Support: TPN [id number] @ 60 ml/hr  And Lipids 250 ml daily providing 1522 kcals (27 kcal/kg), 72 gm pro (1.3 gm/kg), 216 gm CHO, 33% fat. This meets 100% of energy and protein needs.     Intake/Tolerance:    TPN started on  at 40 ml/hr, reached goal that night.  Refeeding labs are as follows (4/3-)  K: 4.7 -> 4.6 -> 4.7 - acceptable  Ma.3 -> 2.3 -> 2.0 - acceptable   Phos: 2.7 -> 2.5 -> 2.2 (L) - getting phos replacement through tomorrow  Na: 130 (L, chronic)  BGM: 183, 214, 204, 165, 136, 171 - improving  I/Os: 2323 / 325 (patient took 360 ml PO)  Last BM:     Wt: 70 kg which is her admit wt but lowest wt this admit was 68.3kg on 3/31  Emesis on 4/3  Pt ate 75% of one meal so far today. Pt reported eating an omelet with ham for breakfast, she prioritizes drinking Boost Shakes, she also likes greek yogurt and pureed fruit from DD[employer] menu. Pt prefers soft foods/[employer] menu because the work of eating can tire her out. Per [facility], pt orders ~2 meals and 2-3 snacks per  "day. Pt reports eating is \"going terribly\" and that she can't eat very much. Patient got Magic Cup while staying at an ARU and requested that again.     ASSESSED NUTRITION NEEDS:  Dosing Weight: 56.2 kg (adjusted)  Energy Needs:  7137-5867 kcals (25-30 Kcal/Kg) - overweight   Protein Needs:  67-84 grams protein (1.2-1.5 g pro/Kg) - hypercatabolism with acute illness     NEW FINDINGS:   4/2 PICC placed and TPN started.   4/2: Per RT, patient dependent on BIPAP or HFNC  4/3:CXR Impression: There are persistent patchy infiltrates of both lungs consistent with infectious/inflammatory pneumonitis. No significant pleural effusion. No pneumothorax. Stable cardiac silhouette.   4/5: repeat CXR pending  4/5: Spoke with pt and family member, performed NFPE. Patient and family very familiar with nutrition services. Family noted that she is retaining fluid and looks more \"filled out\" than usual.   Per MD, pt to start chemotherapy and XRT next week     Previous Goals:   Pt will tolerate TPN without refeeding syndrome. - still monitoring, replacing phos through  TF (as able) will be considered as the more appropriate route for nutrition support - not met    Previous Nutrition Diagnosis:   Inadequate oral intake related to impaired respiratory function and decreased appetite as evidenced by decreased oral intake since admission x 6 days.  Evaluation: No change    MALNUTRITION:  % Weight Loss:  None noted  % Intake:  </= 50% for >/= 5 days (severe malnutrition)   Subcutaneous Fat Loss: None noted  Muscle Loss: Mild clavicle wasting, mild deltoid wasting, mild dorsal wasting.  Fluid Retention: +1 BLE      Malnutrition Diagnosis: Non Severe Malnutrition in the context of acute on chronic illness.    CURRENT NUTRITION DIAGNOSIS  Inadequate oral intake related to impaired respiratory function and decreased appetite as evidenced by oral intake poor to fair at best, patient reliance on TPN for 100% of nutrition needs. "     INTERVENTIONS  Recommendations / Nutrition Prescription  Continue Nutrition Rx as ordered.  Monitor for appropriateness of TPN vs TF as patient becomes less dependent on respiratory support.  Order Chocolate Magic Cup at Dinner, continue Boost Shakes    Implementation  Collaboration and Referral of Nutrition care - Spoke with PharmD about adding insulin to TPN (note plan for 14 units per day).     Goals  Transition patient from TPN to TF if nutrition support continues to be necessary.     MONITORING AND EVALUATION:  Progress towards goals will be monitored and evaluated per protocol and Practice Guidelines    Klaudia Drew  Dietetic Intern

## 2021-04-05 NOTE — PHARMACY-CONSULT NOTE
TPN formula evaluated based on today s labs results.    The following changes have been made:    Insulin:added to equal ~ 14 units/day Insulin.      Pharmacy will continue to follow and adjust as appropriate.

## 2021-04-05 NOTE — PROGRESS NOTES
"PULMONOLOGY PROGRESS NOTE    Date of Admission: 3/26/2021    CC/Reason for Hospital visit: Acute hypoxic respiratory failure, suspected PJP  SUBJECTIVE      Events of WE reviewed. Stable night. No new respiratory problems. On BiPAP overnight. States breathing is about the same today.    ROS: A Problem Pertinent review of systems was negative except for items noted in HPI.  Past Medical, Family, and Social/Substance History has been reviewed: No interval changes.    OBJECTIVE   Vital signs:  Temp: 99.7  F (37.6  C) Temp src: Oral BP: (!) 147/77 Pulse: 106   Resp: 24 SpO2: 92 % O2 Device: High Flow Nasal Cannula (HFNC) Oxygen Delivery: 40 LPM Height: 160 cm (5' 3\") Weight: 68.1 kg (150 lb 2.1 oz)  Estimated body mass index is 26.59 kg/m  as calculated from the following:    Height as of this encounter: 1.6 m (5' 3\").    Weight as of this encounter: 68.1 kg (150 lb 2.1 oz).        I/O last 3 completed shifts:  In: 3073.55 [P.O.:240; I.V.:600]  Out: 1500 [Urine:1300; Emesis/NG output:200]      CONSTITUTIONAL/GENERAL APPEARANCE: Alert female. No Apparent Distress.  PSYCHIATRIC: Pleasant and appropriate mood and affect. Oriented x 3.  EARS, NOSE,THROAT,MOUTH: External ears and nose overall normal. Normal oral mucosa.   NECK: Neck appearance normal. No neck masses and the thyroid is not enlarged.   RESPIRATORY: Non-labored effort. Decreased BS anteriorly, few crackles.  CARDIOVASCULAR: S1, S2, regular rate and rhythm.    LABORATORY ASSESSMENT    Arterial Blood Gas  Recent Labs   Lab 04/04/21  0800 04/02/21  0615 03/31/21  1440 03/29/21  2235   PH 7.45 7.49* 7.49* 7.51*   PCO2 40 35 34* 35   PO2 74* 83 61* 67*   HCO3 28 26 25 28   O2PER 40% 50% 60 50     CBC  Recent Labs   Lab 04/04/21  0811 04/03/21  0527 04/02/21  0550 04/01/21  0500   WBC 12.7* 12.1* 10.9 10.5   RBC 3.52* 3.41* 3.46* 3.46*   HGB 10.2* 9.8* 9.9* 9.8*   HCT 31.2* 30.2* 30.4* 30.4*   MCV 89 89 88 88   MCH 29.0 28.7 28.6 28.3   MCHC 32.7 32.5 32.6 32.2   RDW " 14.4 14.6 14.7 14.6    205 206 185     BMP  Recent Labs   Lab 04/04/21  0811 04/04/21  0518 04/03/21  0527 04/02/21  0550   * 131* 130* 131*   POTASSIUM 4.5 4.6 4.7 4.4   CHLORIDE 99 98 98 98   ESTIVEN 8.8 8.5 8.8 8.7   CO2 28 27 28 25   BUN 22 22 26 20   CR 0.51* 0.55 0.51* 0.57   * 183* 163* 181*     INR  Recent Labs   Lab 04/02/21  0550   INR 1.00      BNPNo lab results found in last 7 days.  VENOUS BLOOD GASESNo lab results found in last 7 days.      Additional labs and/or comments:    IMAGING      CXR 4/2 -  IMPRESSION: Right PICC tip at the superior cavoatrial junction. There are persistent patchy infiltrates of both lungs consistent with infectious/inflammatory pneumonitis. No significant pleural effusion. No pneumothorax. Stable cardiac silhouette.    CXR 3/31 -  IMPRESSION: Slight progression in left basilar patchy infiltrate or  atelectasis with otherwise stable multifocal airspace opacities in  both lungs. No pleural effusion or pneumothorax. The cardiac and  mediastinal silhouettes are normal. Instrumented fusion in the  cervical spine.    PFT & OTHER TESTING       ASSESSMENT / PLAN      Pulmonary diagnoses:  Abnl CT/CXR R91.8  Asthma unspec J45.909  Hypoxemia R09.02  Resp fail acute J96.00    Additional COVID-19 diagnoses:  Concern of possible exposure to COVID-19, Now RULED OUT Z03.818    ASSESSMENT: 75-year-old female with a history of asthma and obstructive sleep apnea on CPAP, recent diagnosis of glioblastoma status post resection 2/23/21 admitted with progressive dyspnea. Admission CT Chest showed bilateral patchy groundglass opacities, negative for PE.  Initially treated with ceftriaxone and doxy. COVID19 negative 3/26 and 3/29.  Increased oxygen needs 3/29, ultimately requiring BiPAP, with repeat CXR showing increase in bilateral patchy opacities.  Found to have DVT 3/29.  ID consulted 3/29, high suspicion for PJP pneumonia (LDH high, high beta d glucan), unable to confirm with  the sputum studies as patient has been unable to provide any sputum.  PJP suspected with prolonged steroid course following neurosurgery, switched from ceftriaxone to zosyn 3/29 and initiated on high dose Bactrim and increased steroids. Continues to require HFNC and intermittent BiPAP. Continue present rx for now.    PLAN:  1. Adjust oxygen, keep SaO2 > 90%  2. BiPAP prn.  3. Bronchodilators - Albuterol nebs  4. Antibiotics - Doxycycline & Bactrim per ID.  5. Steroids - Solumedrol  6. Anticoagulation - Lovenox      Holden Jaime M.D.    [facility]  [phone]   (pager)  [phone]   (office)

## 2021-04-05 NOTE — PLAN OF CARE
PT: Order received for evaluation; currently still requiring bipap and not appropriate for PT evaluation at time of scheduled appointment; possible transition off Bipap by RT today.

## 2021-04-05 NOTE — PLAN OF CARE
IMC status. AOX4. Denies pain. VSS on HFSC at 55L. Up with 1 gb, walker to the chair. Poor appetite. Purewick in place, good urine output. Smear for BM. States she feels very tired today, she was in the chair for breakfast.  Pulsate mattress in room when patient gets out of bed again. Daughter visiting at bedside.  Tele- NSR to sinus tachycardia.

## 2021-04-05 NOTE — PROGRESS NOTES
Pt was on Bipap on this shift with total mask. Albuterol neb given in-line as scheduled. HHFNC at bedside. RT will continue to follow.

## 2021-04-05 NOTE — PROVIDER NOTIFICATION
305 MB, sepsis protocol flagged. Lactic acid sent down and was 2.4.   Thanks!   Oumou SAM  [phone]

## 2021-04-05 NOTE — PROGRESS NOTES
[facility]    Infectious Disease Progress Note    Date of Service (when I saw the patient): 04/05/2021     Assessment & Plan   Olga Bailey is a 75 year old female who was admitted on 3/26/2021.     Impression:  1. 75 y.o with recent diagnosis of craniotomy and resection of glioblastoma multiforme.   2. Has been on very high dose steroid taper for the past month since the craniotomy. Not on any bactrim prophylaxis before admission.    3. Admitted with shortness of breath.   4. Found to have bilateral ground glass infiltrates on the imaging, very hypoxic.   5. COVID PCR negative x 2.   6. No leucocytosis, procal not elevated.      Recommendations:   High suspicion for PJP pneumonia. LDH high, high beta d glucan unable to confirm with the sputum studies as patient has been unable to provide any sputum   DC zosyn )  And on treatment dose for PJP as we wait on sputum cultures.   Steroids for PJP, taper as able   Get sputum for regular bacterial culture, PJP if possible when ever patient able to provide sputum ( labs have been ordered)   Day 7/21 septra slow improvement to be expected   Obviously with not proven infection conceivably second org or not PJP but no sign of either      Donny Sanabria MD    Interval History   On  high flow comfortable  No fever   No new micro   No new complaints       Physical Exam   Temp: 98.2  F (36.8  C) Temp src: Axillary BP: (!) 147/76 Pulse: 97   Resp: (!) 36 SpO2: 98 % O2 Device: High Flow Nasal Cannula (HFNC) Oxygen Delivery: 55 LPM  Vitals:    03/31/21 0600 04/01/21 0625 04/05/21 0400   Weight: 68.3 kg (150 lb 9.6 oz) 68.1 kg (150 lb 2.1 oz) 70 kg (154 lb 6.4 oz)     Vital Signs with Ranges  Temp:  [98.2  F (36.8  C)-99.7  F (37.6  C)] 98.2  F (36.8  C)  Pulse:  [] 97  Resp:  [24-36] 36  BP: (127-147)/(65-88) 147/76  FiO2 (%):  [82 %] 82 %  SpO2:  [92 %-99 %] 98 %    Constitutional: Awake, alert  Lungs: diminished bilateral   Cardiovascular:  Regular rate and rhythm, normal S1 and S2, and no murmur noted  Abdomen: Normal bowel sounds, soft, non-distended, non-tender  Skin: No rashes, no cyanosis, no edema  Other:    Medications     dextrose       dextrose       - MEDICATION INSTRUCTIONS -       parenteral nutrition - Clinimix E 60 mL/hr at 04/04/21 2234       albuterol  2.5 mg Nebulization Q4H While awake     amLODIPine  10 mg Oral Daily     busPIRone HCl  30 mg Oral BID     enoxaparin ANTICOAGULANT  70 mg Subcutaneous Q12H     famotidine  20 mg Oral BID     FLUoxetine  80 mg Oral Daily     insulin aspart  1-12 Units Subcutaneous Q4H     lipids  250 mL Intravenous Q24H     potassium & sodium phosphates  1 packet Oral TID     predniSONE  40 mg Oral BID w/meals     sodium chloride (PF)  3 mL Intracatheter Q8H     sulfamethoxazole-trimethoprim  320 mg Intravenous Q8H       Data   All microbiology laboratory data reviewed.  Recent Labs   Lab Test 04/05/21  0640 04/04/21  0811 04/03/21  0527   WBC 15.4* 12.7* 12.1*   HGB 9.1* 10.2* 9.8*   HCT 28.1* 31.2* 30.2*   MCV 88 89 89    223 205     Recent Labs   Lab Test 04/05/21  0640 04/04/21  0811 04/04/21  0518   CR 0.50* 0.51* 0.55     No lab results found.  Recent Labs   Lab Test 03/29/21 1951 03/29/21 1946   CULT No growth No growth       Attestation:  Total time on the floor involved in the patient's care: 35 minutes. Total time spent in counseling/care coordination: >50%

## 2021-04-05 NOTE — PLAN OF CARE
[id number]: No significant changes this shift. A&O. VSS on bipap overnight. Tele: SR. LS diminished with crackles in the bases bilatally, tachypneic at times. Ativan given x1 for anxiety. TPN and lipids infusing. Indepenent with bed mobility, but could be here a while longer and skin is fragile so pulsate ordered. Incontinent at times, purwick inplace with adequate output, no skin break down. Denies pain or nauesa. Plan: continue with antibiotics and pulmonary hygiene, weaning O2 as able.

## 2021-04-05 NOTE — PROGRESS NOTES
[facility]    Medicine Progress Note - Hospitalist Service       Date of Admission:  3/26/2021  Assessment & Plan       Olga Bailey is a 75 year old female admitted on 3/26/2021. past medical history that is most notable for recent craniotomy and resection of glioblastoma multiforme, as well as uncomplicated asthma, who presents with dyspnea and is found to have acute bilateral pneumonia.     Acute hypoxic respiratory failure due to bilateral pneumonia, suspected PJP  * Presents with progressive dyspnea.  Afebrile without leukocytosis but left shift and lymphopenia noted on differential.  Admission CT showing bilateral patchy groundglass opacities, negative for PE.  Initially treated with ceftriaxone and doxy.   * COVID19 negative 3/26 and 3/29.    * Procal low 3/28 and 3/29  * Increased oxygen needs 3/29 (ultimately requiring Bipap) with repeat CXR showing increase in bilateral patchy opacities  * Repeat COVID, procal, trop, BNP negative, resp viral panel negative, aspergillus studies, EKG unremarkable on 3/29  * TTE 3/29 with EF 50-55% without WMA, mildly decreased RV systolic function with mild dilation.  * Found to have DVT 3/29 but with normal hemodynamics, normal trop and BNP, no significant RV strain on echo and risk for renal injury with contrast and initiation of high dose Bactrim, did not pursue CT imaging given very low concern for saddle embolus with need for thrombectomy.  * ID consulted 3/29, suspect PJP with prolonged steroid course following neurosurgery (see below).  * Switched from ceftriaxone to zosyn 3/29 and initiated on high dose Bactrim and increased steroids  * Completed 5-day course of doxycycline 4/1 and 7-day course of zosyn 4/4  * 1, 3 beta glucan and fungitell or positive and likely indicative of PJP    - currently on high dose Bactrim (dose 7/21)  - taking pills well, will stop methylpred and switch back to prednisone 40 mg bid  - albuterol q4h while  awake  - continue alternating Bipap and HFNC; wean as able  - so far unable to produce any sputum for confirmatory culture  - leukocytosis trending slightly up but afebrile - ?steroids; monitor  - repeat CXR 4/5 pending     Bilateral LE DVT  US 3/29 showing occlusive DVT of bilateral posterior tibial veins and soleal veins to mid calf.  - continue on lovenox     Hyponatremia  Possible due to SIADH due to pulmonary process.  Does not appear volume overloaded.   - Na stable about 130, monitor daily    Steroid induced hyperglycemia  - continue high dose ssi; insulin in TPN per PharmD     Urinary incontinence  UA negative 4/2  - continue pure wick      Hypokalemia  - K protocol     GBM s/p resection 2/23/21  Followed by Dr. Baker of Neuro-Oncology.  She is in process of being set up to start chemotherapy and XRT next week.   Appears to have been on prolonged steroids following neurosurgery as was discharged without plan for taper and initiated taper only recently in follow up with Dr. Baker.  - was down to decadron 2mg daily per taper;   discontinued decadron with initiation of high dose steroid for PJP as above   - has not yet initiated chemo (temozolomide) and planned radiation     Hypertension  - continue PTA amlodipine     Asthma  - albuterol as above     Depression and anxiety  Insomnia  - continue PTA Prozac and Buspar  - continue PTA Atarax for sleep  - prn lorazepam     GERD  - continue PTA Pepcid     Chronic anemia  Baseline hgb 10-11 g/dL, gradually trended slightly down this admission likely due to marrow suppression in acute illness.  - hgb stable 9-10 g/dL on 4/5     Malnutrition  Noted decrease oral intake and drop in albumin, subcutaneous fat loss on exam.   - PICC line in place  - continue TPN         Diet: Snacks/Supplements Adult: Boost Shake; Between Meals  Regular Diet Adult  parenteral nutrition - Clinimix E    DVT Prophylaxis: therapeutic lovenox  Martino Catheter: not present  Code Status: Full Code            Disposition Plan   Expected discharge: 4 - 7 days, recommended to transitional care unit once O2 use less than 3 liters/minute.  Entered: Doug Almaguer MD 04/05/2021, 9:10 AM       The patient's care was discussed with the Bedside Nurse, Patient and Patient's Family.    Doug Almaguer MD  Hospitalist Service  [facility]  Contact information available via [facility] Paging/Directory    ______________________________________________________________________    Interval History   Reports she doesn't feel as strong as she had hoped, frustrated over slow pace of recovery.  Chest pressure slowly improving.  Cough remains very intermittent and non-productive.  No fever/chills.  Some degree orthopnea but no edema.     Data reviewed today: I reviewed all medications, new labs and imaging results over the last 24 hours. I personally reviewed no images or EKG's today.    Physical Exam   Vital Signs: Temp: 98.2  F (36.8  C) Temp src: Axillary BP: (!) 147/76 Pulse: 97   Resp: (!) 36 SpO2: 98 % O2 Device: High Flow Nasal Cannula (HFNC) Oxygen Delivery: 55 LPM  Weight: 154 lbs 6.4 oz  General Appearance: Thin female in NAD  Respiratory: bilateral posterior crackles in middle and lower lobes  Cardiovascular: RRR, normal s1/s2 with grade 1/6 systolic murmur  GI: abdomen soft, nontender, normal bowel sounds  Other: Alert and appropriate, cranial nerves grossly intact     Data   Recent Labs   Lab 04/05/21  0640 04/04/21  0811 04/04/21  0518 04/03/21  0527 04/02/21  0550 03/29/21  1947 03/29/21 1947   WBC 15.4* 12.7*  --  12.1* 10.9   < >  --    HGB 9.1* 10.2*  --  9.8* 9.9*   < >  --    MCV 88 89  --  89 88   < >  --     223  --  205 206   < >  --    INR 1.02  --   --   --  1.00  --   --    * 132* 131* 130* 131*   < > 131*   POTASSIUM 4.7 4.5 4.6 4.7 4.4   < > 3.7   CHLORIDE 98 99 98 98 98   < > 99   CO2 26 28 27 28 25   < > 29   BUN 25 22 22 26 20   < > 21   CR 0.50* 0.51* 0.55 0.51* 0.57   <  > 0.57   ANIONGAP 6 5 6 4 8   < > 3   ESTIVEN 8.7 8.8 8.5 8.8 8.7   < > 8.8   * 138* 183* 163* 181*   < > 156*   ALBUMIN 2.3*  --   --   --  2.4*   < > 2.8*   PROTTOTAL 5.8*  --   --   --  5.9*   < > 6.7*   BILITOTAL 0.3  --   --   --  0.3   < > 0.3   ALKPHOS 90  --   --   --  78   < > 74   ALT 28  --   --   --  27   < > 36   AST 28  --   --   --  23   < > 26   TROPI  --   --   --   --   --   --  <0.015    < > = values in this interval not displayed.

## 2021-04-05 NOTE — PROGRESS NOTES
Hospitalist update    Paged regarding sepsis protocol triggering with lactate slightly elevated at 2.4.  Afebrile, hemodynamics unchanged with stable oxygen needs.  Suspect lactate due to hypoxia as opposed to sepsis and will repeat in 2 hours.  Hold on any fluids or change in antibiotics at this time.

## 2021-04-06 ENCOUNTER — APPOINTMENT (OUTPATIENT)
Dept: PHYSICAL THERAPY | Facility: CLINIC | Age: 76
DRG: 166 | End: 2021-04-06
Attending: INTERNAL MEDICINE
Payer: MEDICARE

## 2021-04-06 LAB
ANION GAP SERPL CALCULATED.3IONS-SCNC: 6 MMOL/L (ref 3–14)
BASE EXCESS BLDV CALC-SCNC: 3.5 MMOL/L
BUN SERPL-MCNC: 23 MG/DL (ref 7–30)
CALCIUM SERPL-MCNC: 8.9 MG/DL (ref 8.5–10.1)
CHLORIDE SERPL-SCNC: 99 MMOL/L (ref 94–109)
CO2 SERPL-SCNC: 27 MMOL/L (ref 20–32)
CREAT SERPL-MCNC: 0.5 MG/DL (ref 0.52–1.04)
ERYTHROCYTE [DISTWIDTH] IN BLOOD BY AUTOMATED COUNT: 14.2 % (ref 10–15)
GFR SERPL CREATININE-BSD FRML MDRD: >90 ML/MIN/{1.73_M2}
GLUCOSE BLDC GLUCOMTR-MCNC: 120 MG/DL (ref 70–99)
GLUCOSE BLDC GLUCOMTR-MCNC: 140 MG/DL (ref 70–99)
GLUCOSE BLDC GLUCOMTR-MCNC: 155 MG/DL (ref 70–99)
GLUCOSE BLDC GLUCOMTR-MCNC: 177 MG/DL (ref 70–99)
GLUCOSE BLDC GLUCOMTR-MCNC: 185 MG/DL (ref 70–99)
GLUCOSE BLDC GLUCOMTR-MCNC: 200 MG/DL (ref 70–99)
GLUCOSE SERPL-MCNC: 132 MG/DL (ref 70–99)
HCO3 BLDV-SCNC: 27 MMOL/L (ref 21–28)
HCT VFR BLD AUTO: 27.5 % (ref 35–47)
HGB BLD-MCNC: 8.8 G/DL (ref 11.7–15.7)
LACTATE BLD-SCNC: 2.2 MMOL/L (ref 0.7–2)
MCH RBC QN AUTO: 28.2 PG (ref 26.5–33)
MCHC RBC AUTO-ENTMCNC: 32 G/DL (ref 31.5–36.5)
MCV RBC AUTO: 88 FL (ref 78–100)
O2/TOTAL GAS SETTING VFR VENT: ABNORMAL %
OXYHGB MFR BLDV: 93 %
PCO2 BLDV: 34 MM HG (ref 40–50)
PH BLDV: 7.5 PH (ref 7.32–7.43)
PHOSPHATE SERPL-MCNC: 2.8 MG/DL (ref 2.5–4.5)
PLATELET # BLD AUTO: 223 10E9/L (ref 150–450)
PO2 BLDV: 62 MM HG (ref 25–47)
POTASSIUM SERPL-SCNC: 4.7 MMOL/L (ref 3.4–5.3)
PROCALCITONIN SERPL-MCNC: 0.09 NG/ML
RBC # BLD AUTO: 3.12 10E12/L (ref 3.8–5.2)
SODIUM SERPL-SCNC: 132 MMOL/L (ref 133–144)
WBC # BLD AUTO: 16.1 10E9/L (ref 4–11)

## 2021-04-06 PROCEDURE — 80048 BASIC METABOLIC PNL TOTAL CA: CPT | Performed by: HOSPITALIST

## 2021-04-06 PROCEDURE — 120N000001 HC R&B MED SURG/OB

## 2021-04-06 PROCEDURE — 97162 PT EVAL MOD COMPLEX 30 MIN: CPT | Mod: GP | Performed by: PHYSICAL THERAPIST

## 2021-04-06 PROCEDURE — 94640 AIRWAY INHALATION TREATMENT: CPT | Mod: 76

## 2021-04-06 PROCEDURE — 84145 PROCALCITONIN (PCT): CPT | Performed by: HOSPITALIST

## 2021-04-06 PROCEDURE — 83605 ASSAY OF LACTIC ACID: CPT | Performed by: HOSPITALIST

## 2021-04-06 PROCEDURE — 94660 CPAP INITIATION&MGMT: CPT

## 2021-04-06 PROCEDURE — 250N000012 HC RX MED GY IP 250 OP 636 PS 637: Performed by: HOSPITALIST

## 2021-04-06 PROCEDURE — 250N000009 HC RX 250: Performed by: HOSPITALIST

## 2021-04-06 PROCEDURE — 250N000013 HC RX MED GY IP 250 OP 250 PS 637: Performed by: INTERNAL MEDICINE

## 2021-04-06 PROCEDURE — 250N000011 HC RX IP 250 OP 636: Performed by: INTERNAL MEDICINE

## 2021-04-06 PROCEDURE — 250N000011 HC RX IP 250 OP 636: Performed by: HOSPITALIST

## 2021-04-06 PROCEDURE — 120N000013 HC R&B IMCU

## 2021-04-06 PROCEDURE — 97530 THERAPEUTIC ACTIVITIES: CPT | Mod: GP | Performed by: PHYSICAL THERAPIST

## 2021-04-06 PROCEDURE — 250N000013 HC RX MED GY IP 250 OP 250 PS 637: Performed by: HOSPITALIST

## 2021-04-06 PROCEDURE — 250N000009 HC RX 250: Performed by: INTERNAL MEDICINE

## 2021-04-06 PROCEDURE — 999N000190 HC STATISTIC VAT ROUNDS

## 2021-04-06 PROCEDURE — 999N000157 HC STATISTIC RCP TIME EA 10 MIN

## 2021-04-06 PROCEDURE — 36415 COLL VENOUS BLD VENIPUNCTURE: CPT | Performed by: HOSPITALIST

## 2021-04-06 PROCEDURE — 84100 ASSAY OF PHOSPHORUS: CPT | Performed by: HOSPITALIST

## 2021-04-06 PROCEDURE — 258N000003 HC RX IP 258 OP 636: Performed by: HOSPITALIST

## 2021-04-06 PROCEDURE — 99233 SBSQ HOSP IP/OBS HIGH 50: CPT | Performed by: HOSPITALIST

## 2021-04-06 PROCEDURE — 85027 COMPLETE CBC AUTOMATED: CPT | Performed by: HOSPITALIST

## 2021-04-06 PROCEDURE — 999N001017 HC STATISTIC GLUCOSE BY METER IP

## 2021-04-06 PROCEDURE — 94640 AIRWAY INHALATION TREATMENT: CPT

## 2021-04-06 PROCEDURE — 82805 BLOOD GASES W/O2 SATURATION: CPT | Performed by: HOSPITALIST

## 2021-04-06 RX ORDER — OXYMETAZOLINE HYDROCHLORIDE 0.05 G/100ML
3 SPRAY NASAL 2 TIMES DAILY
Status: DISCONTINUED | OUTPATIENT
Start: 2021-04-06 | End: 2021-04-06

## 2021-04-06 RX ORDER — OXYMETAZOLINE HYDROCHLORIDE 0.05 G/100ML
3 SPRAY NASAL 2 TIMES DAILY PRN
Status: DISCONTINUED | OUTPATIENT
Start: 2021-04-06 | End: 2021-04-07

## 2021-04-06 RX ADMIN — FAMOTIDINE 20 MG: 20 TABLET ORAL at 09:17

## 2021-04-06 RX ADMIN — BUSPIRONE HYDROCHLORIDE 30 MG: 15 TABLET ORAL at 09:17

## 2021-04-06 RX ADMIN — PREDNISONE 40 MG: 20 TABLET ORAL at 09:17

## 2021-04-06 RX ADMIN — FLUOXETINE 80 MG: 20 CAPSULE ORAL at 09:17

## 2021-04-06 RX ADMIN — LORAZEPAM 0.5 MG: 2 INJECTION INTRAMUSCULAR; INTRAVENOUS at 02:34

## 2021-04-06 RX ADMIN — INSULIN ASPART 1 UNITS: 100 INJECTION, SOLUTION INTRAVENOUS; SUBCUTANEOUS at 12:24

## 2021-04-06 RX ADMIN — SULFAMETHOXAZOLE AND TRIMETHOPRIM 320 MG: 80; 16 INJECTION, SOLUTION, CONCENTRATE INTRAVENOUS at 09:18

## 2021-04-06 RX ADMIN — INSULIN ASPART 2 UNITS: 100 INJECTION, SOLUTION INTRAVENOUS; SUBCUTANEOUS at 20:24

## 2021-04-06 RX ADMIN — HYDROXYZINE HYDROCHLORIDE 50 MG: 25 TABLET, FILM COATED ORAL at 22:19

## 2021-04-06 RX ADMIN — INSULIN ASPART 3 UNITS: 100 INJECTION, SOLUTION INTRAVENOUS; SUBCUTANEOUS at 17:25

## 2021-04-06 RX ADMIN — I.V. FAT EMULSION 250 ML: 20 EMULSION INTRAVENOUS at 20:27

## 2021-04-06 RX ADMIN — ALBUTEROL SULFATE 2.5 MG: 2.5 SOLUTION RESPIRATORY (INHALATION) at 23:14

## 2021-04-06 RX ADMIN — FAMOTIDINE 20 MG: 20 TABLET ORAL at 20:26

## 2021-04-06 RX ADMIN — OXYMETAZOLINE HYDROCHLORIDE 3 SPRAY: 0.05 SPRAY NASAL at 17:30

## 2021-04-06 RX ADMIN — SULFAMETHOXAZOLE AND TRIMETHOPRIM 320 MG: 80; 16 INJECTION, SOLUTION, CONCENTRATE INTRAVENOUS at 17:36

## 2021-04-06 RX ADMIN — BUSPIRONE HYDROCHLORIDE 30 MG: 15 TABLET ORAL at 20:26

## 2021-04-06 RX ADMIN — ALBUTEROL SULFATE 2.5 MG: 2.5 SOLUTION RESPIRATORY (INHALATION) at 14:57

## 2021-04-06 RX ADMIN — ENOXAPARIN SODIUM 70 MG: 80 INJECTION SUBCUTANEOUS at 22:20

## 2021-04-06 RX ADMIN — ALBUTEROL SULFATE 2.5 MG: 2.5 SOLUTION RESPIRATORY (INHALATION) at 19:42

## 2021-04-06 RX ADMIN — INSULIN ASPART 2 UNITS: 100 INJECTION, SOLUTION INTRAVENOUS; SUBCUTANEOUS at 00:22

## 2021-04-06 RX ADMIN — INSULIN ASPART 1 UNITS: 100 INJECTION, SOLUTION INTRAVENOUS; SUBCUTANEOUS at 04:10

## 2021-04-06 RX ADMIN — ENOXAPARIN SODIUM 70 MG: 80 INJECTION SUBCUTANEOUS at 09:44

## 2021-04-06 RX ADMIN — ALBUTEROL SULFATE 2.5 MG: 2.5 SOLUTION RESPIRATORY (INHALATION) at 07:20

## 2021-04-06 RX ADMIN — ALBUTEROL SULFATE 2.5 MG: 2.5 SOLUTION RESPIRATORY (INHALATION) at 11:03

## 2021-04-06 RX ADMIN — SULFAMETHOXAZOLE AND TRIMETHOPRIM 320 MG: 80; 16 INJECTION, SOLUTION, CONCENTRATE INTRAVENOUS at 00:26

## 2021-04-06 RX ADMIN — ALBUTEROL SULFATE 2.5 MG: 2.5 SOLUTION RESPIRATORY (INHALATION) at 00:35

## 2021-04-06 RX ADMIN — AMLODIPINE BESYLATE 10 MG: 10 TABLET ORAL at 09:17

## 2021-04-06 RX ADMIN — PREDNISONE 40 MG: 20 TABLET ORAL at 17:40

## 2021-04-06 ASSESSMENT — MIFFLIN-ST. JEOR: SCORE: 1174.13

## 2021-04-06 ASSESSMENT — ACTIVITIES OF DAILY LIVING (ADL)
ADLS_ACUITY_SCORE: 22

## 2021-04-06 NOTE — PROGRESS NOTES
SPIRITUAL HEALTH SERVICES Progress Note  [facility]    Referral Source: Request by family for follow-up visit with PT    Chatted very briefly with PT who was eating lunch and requested a return visit later today or tomorrow.    Follow-up with PT while she remains in hospital.      Doug Silver  Chaplain Resident

## 2021-04-06 NOTE — PROGRESS NOTES
"PULMONOLOGY PROGRESS NOTE    Date of Admission: 3/26/2021    CC/Reason for Hospital visit: Acute hypoxic respiratory failure, suspected PJP  SUBJECTIVE      Events reviewed since last seen. On BiPAP overnight. Stable night. No new respiratory problems. States breathing is slightly better today.    ROS: A Problem Pertinent review of systems was negative except for items noted in HPI.  Past Medical, Family, and Social/Substance History has been reviewed: No interval changes.    OBJECTIVE   Vital signs:  Temp: 99.7  F (37.6  C) Temp src: Oral BP: (!) 147/77 Pulse: 106   Resp: 24 SpO2: 92 % O2 Device: High Flow Nasal Cannula (HFNC) Oxygen Delivery: 40 LPM Height: 160 cm (5' 3\") Weight: 68.1 kg (150 lb 2.1 oz)  Estimated body mass index is 26.59 kg/m  as calculated from the following:    Height as of this encounter: 1.6 m (5' 3\").    Weight as of this encounter: 68.1 kg (150 lb 2.1 oz).        I/O last 3 completed shifts:  In: 3073.55 [P.O.:240; I.V.:600]  Out: 1500 [Urine:1300; Emesis/NG output:200]      CONSTITUTIONAL/GENERAL APPEARANCE: Alert female. No Apparent Distress.  PSYCHIATRIC: Pleasant and appropriate mood and affect. Oriented x 3.  EARS, NOSE,THROAT,MOUTH: External ears and nose overall normal. Normal oral mucosa.   NECK: Neck appearance normal. No neck masses and the thyroid is not enlarged.   RESPIRATORY: Non-labored effort. Decreased BS, sl coarse.  CARDIOVASCULAR: S1, S2, regular rate and rhythm.    LABORATORY ASSESSMENT    Arterial Blood Gas  Recent Labs   Lab 04/04/21  0800 04/02/21  0615 03/31/21  1440 03/29/21  2235   PH 7.45 7.49* 7.49* 7.51*   PCO2 40 35 34* 35   PO2 74* 83 61* 67*   HCO3 28 26 25 28   O2PER 40% 50% 60 50     CBC  Recent Labs   Lab 04/04/21  0811 04/03/21  0527 04/02/21  0550 04/01/21  0500   WBC 12.7* 12.1* 10.9 10.5   RBC 3.52* 3.41* 3.46* 3.46*   HGB 10.2* 9.8* 9.9* 9.8*   HCT 31.2* 30.2* 30.4* 30.4*   MCV 89 89 88 88   MCH 29.0 28.7 28.6 28.3   MCHC 32.7 32.5 32.6 32.2   RDW " 14.4 14.6 14.7 14.6    205 206 185     BMP  Recent Labs   Lab 04/04/21  0811 04/04/21  0518 04/03/21  0527 04/02/21  0550   * 131* 130* 131*   POTASSIUM 4.5 4.6 4.7 4.4   CHLORIDE 99 98 98 98   ESTIVEN 8.8 8.5 8.8 8.7   CO2 28 27 28 25   BUN 22 22 26 20   CR 0.51* 0.55 0.51* 0.57   * 183* 163* 181*     INR  Recent Labs   Lab 04/02/21  0550   INR 1.00      BNPNo lab results found in last 7 days.  VENOUS BLOOD GASESNo lab results found in last 7 days.      Additional labs and/or comments:    IMAGING      CXR 4/5 -  IMPRESSION: Mixed interstitial and airspace disease probably similar  to previous given slight differences in technique. PICC line stable.    CXR 4/2 -  IMPRESSION: Right PICC tip at the superior cavoatrial junction. There are persistent patchy infiltrates of both lungs consistent with infectious/inflammatory pneumonitis. No significant pleural effusion. No pneumothorax. Stable cardiac silhouette.    PFT & OTHER TESTING       ASSESSMENT / PLAN      Pulmonary diagnoses:  Abnl CT/CXR R91.8  Asthma unspec J45.909  Hypoxemia R09.02  Resp fail acute J96.00    Additional COVID-19 diagnoses:  Concern of possible exposure to COVID-19, Now RULED OUT Z03.818    ASSESSMENT: 75-year-old female with a history of asthma and obstructive sleep apnea on CPAP, recent diagnosis of glioblastoma status post resection 2/23/21 admitted with progressive dyspnea. Admission CT Chest showed bilateral patchy groundglass opacities, negative for PE.  Initially treated with ceftriaxone and doxy. COVID19 negative 3/26 and 3/29.  Increased oxygen needs 3/29, ultimately requiring BiPAP, with repeat CXR showing increase in bilateral patchy opacities.  Found to have DVT 3/29.  ID consulted 3/29, high suspicion for PJP pneumonia (LDH high, high beta d glucan), unable to confirm with the sputum studies as patient has been unable to provide any sputum.  PJP suspected secondary to prolonged steroid course following neurosurgery,  switched from ceftriaxone to zosyn 3/29 and initiated on high dose Bactrim and increased steroids. Follow-up CXRs 4/2 and 4/5 show persistent patchy infiltrates bilaterally. Continues to require HFNC during the day and BiPAP at night. O2 requirements sl decreased from yesterday, continue present rx for now.    PLAN:  1. Adjust oxygen, keep SaO2 > 90%  2. BiPAP at night and prn during the day.  3. Bronchodilators - Albuterol nebs  4. Antibiotics - Bactrim.  5. Steroids - Prednisone.  6. Anticoagulation - Lovenox.  7. Increase activity and IS as tolerated.      Holden Jaime M.D.    [facility]  [phone]   (pager)  [phone]   (office)

## 2021-04-06 NOTE — PROGRESS NOTES
Per discussion with Dr. Baker as patient is still inpatient, she will likely need RT course adjusted from 30 fractions to 15 fractions.    Left VM with [facility] requesting call back from nursing to discuss.    Spoke with ELEANOR Acharya at [facility] and confirmed plan would be for 15 fractions vs 30 fractions of RT. Will touch base with Patrizia pending discharge.     Louise Jerry, JIANN, RN, PHN, OCN  Oncology Care Coordinator  [facility]

## 2021-04-06 NOTE — PROVIDER NOTIFICATION
MD Notification    Notified Person: MD    Notified Person Name: Tripp    Notification Date/Time: 1041    Notification Interaction: Paged    Purpose of Notification:Lactic Acid     Orders Received: Lactic acid 2.2, no new orders.     Comments:

## 2021-04-06 NOTE — PROGRESS NOTES
[facility]    Infectious Disease Progress Note    Date of Service (when I saw the patient): 04/06/2021     Assessment & Plan   Olga Bailey is a 75 year old female who was admitted on 3/26/2021.     Impression:  1. 75 y.o with recent diagnosis of craniotomy and resection of glioblastoma multiforme.   2. Has been on very high dose steroid taper for the past month since the craniotomy. Not on any bactrim prophylaxis before admission.    3. Admitted with shortness of breath.   4. Found to have bilateral ground glass infiltrates on the imaging, very hypoxic.   5. COVID PCR negative x 2.   6. No leucocytosis, procal not elevated.      Recommendations:   High suspicion for PJP pneumonia. LDH high, high beta d glucan unable to confirm with the sputum studies as patient has been unable to provide any sputum   DC zosyn )  And on treatment dose for PJP as we wait on sputum cultures.   Steroids for PJP, taper as able   Get sputum for regular bacterial culture, PJP if possible when ever patient able to provide sputum ( labs have been ordered)   Day 8/21 septra slow improvement to be expected   Obviously with not proven infection conceivably second org, or not PJP but no sign of either, so continued close monitor      Donny Sanabria MD    Interval History   On  high flow comfortable  No fever   No new micro   No new complaints       Physical Exam   Temp: 97.9  F (36.6  C) Temp src: Oral BP: 127/66 Pulse: 99   Resp: 18 SpO2: 96 % O2 Device: High Flow Nasal Cannula (HFNC) Oxygen Delivery: 30 LPM  Vitals:    04/01/21 0625 04/05/21 0400 04/06/21 0600   Weight: 68.1 kg (150 lb 2.1 oz) 70 kg (154 lb 6.4 oz) 71 kg (156 lb 8.4 oz)     Vital Signs with Ranges  Temp:  [97.2  F (36.2  C)-98.2  F (36.8  C)] 97.9  F (36.6  C)  Pulse:  [] 99  Resp:  [13-33] 18  BP: (122-153)/(66-90) 127/66  FiO2 (%):  [45 %-78 %] 60 %  SpO2:  [91 %-99 %] 96 %    Constitutional: Awake, alert  Lungs: diminished bilateral    Cardiovascular: Regular rate and rhythm, normal S1 and S2, and no murmur noted  Abdomen: Normal bowel sounds, soft, non-distended, non-tender  Skin: No rashes, no cyanosis, no edema  Other:    Medications     dextrose       dextrose       - MEDICATION INSTRUCTIONS -       parenteral nutrition - Clinimix E 60 mL/hr at 04/05/21 2007       albuterol  2.5 mg Nebulization Q4H While awake     amLODIPine  10 mg Oral Daily     busPIRone HCl  30 mg Oral BID     enoxaparin ANTICOAGULANT  70 mg Subcutaneous Q12H     famotidine  20 mg Oral BID     FLUoxetine  80 mg Oral Daily     insulin aspart  1-12 Units Subcutaneous Q4H     lipids  250 mL Intravenous Q24H     predniSONE  40 mg Oral BID w/meals     sodium chloride (PF)  3 mL Intracatheter Q8H     sulfamethoxazole-trimethoprim  320 mg Intravenous Q8H       Data   All microbiology laboratory data reviewed.  Recent Labs   Lab Test 04/06/21  0600 04/05/21  0640 04/04/21  0811   WBC 16.1* 15.4* 12.7*   HGB 8.8* 9.1* 10.2*   HCT 27.5* 28.1* 31.2*   MCV 88 88 89    214 223     Recent Labs   Lab Test 04/06/21  0600 04/05/21  0640 04/04/21  0811   CR 0.50* 0.50* 0.51*     No lab results found.  Recent Labs   Lab Test 03/29/21 1951 03/29/21 1946   CULT No growth No growth       Attestation:  Total time on the floor involved in the patient's care: 35 minutes. Total time spent in counseling/care coordination: >50%

## 2021-04-06 NOTE — PLAN OF CARE
IMC status. Pt is A&Ox4, HTN at times (does not meet PRN parameters), denies pain. On HFNC during the day, on BiPap at night. Tolerated being on Bipap overnight @ 45% FiO2 sating mid to upper 90's. LS dim with crackles in bases, JACOBSEN, BS+ active, flatus+. +1 edema in BLE. Purewick in place d/t incontinence, no skin redness or breakdown noted. Adequate UOP. TPN and lipids infusing into PICC in RUE. Pt is assistx1-2, repositions indepenently, NPO while on BiPap, denies N/V. Tele: NSR, PRN ativan given for anxiety. BG checks q4h. Pulsate matress not set up d/t pt being too lethargic/weak to get out of bed, pt turns well on own-coccyx/sacrum with no redness.

## 2021-04-06 NOTE — PROGRESS NOTES
[facility]    Medicine Progress Note - Hospitalist Service       Date of Admission:  3/26/2021  Assessment & Plan         Olga Bailey is a 75 year old female admitted on 3/26/2021. past medical history that is most notable for recent craniotomy and resection of glioblastoma multiforme, as well as uncomplicated asthma, who presents with dyspnea and is found to have acute bilateral pneumonia.       Acute hypoxic respiratory failure due to bilateral pneumonia, suspected PJP  * Presents with progressive dyspnea.  Afebrile without leukocytosis but left shift and lymphopenia noted on differential.  Admission CT showing bilateral patchy groundglass opacities, negative for PE.  Initially treated with ceftriaxone and doxy.   * COVID19 negative 3/26 and 3/29.    * Procal low 3/28 and 3/29  * Increased oxygen needs 3/29 (ultimately requiring Bipap) with repeat CXR showing increase in bilateral patchy opacities  * Repeat COVID, procal, trop, BNP negative, resp viral panel negative, aspergillus studies, EKG unremarkable on 3/29  * TTE 3/29 with EF 50-55% without WMA, mildly decreased RV systolic function with mild dilation.  * Found to have DVT 3/29 but with normal hemodynamics, normal trop and BNP, no significant RV strain on echo and risk for renal injury with contrast and initiation of high dose Bactrim, did not pursue CT imaging given very low concern for saddle embolus with need for thrombectomy.  * ID consulted 3/29, suspect PJP with prolonged steroid course following neurosurgery (see below).  * Switched from ceftriaxone to zosyn 3/29 and initiated on high dose Bactrim and increased steroids  * Completed 5-day course of doxycycline 4/1 and 7-day course of zosyn 4/4  * 1, 3 beta glucan and fungitell or positive and likely indicative of PJP    - currently on high dose Bactrim (dose 8/21) and prednisone 40 mg bid  - albuterol q4h while awake  - continue alternating Bipap and HFNC; wean as  able  - so far unable to produce any sputum for confirmatory culture  - leukocytosis continues to trend up slowly; possibly due to steroids but with lactate (sepsis protocol) yesterday being mildly elevated (suspect related to hypoxia) will repeat lactate this AM, check VBG and repeat procal; note CXR 4/5 was unchanged     Bilateral LE DVT  US 3/29 showing occlusive DVT of bilateral posterior tibial veins and soleal veins to mid calf.  - continue on therapeutic lovenox     Hyponatremia  Possible due to SIADH due to pulmonary process.  Does not appear volume overloaded.   - Na stable about 130, monitor daily    Steroid induced hyperglycemia  - continue high dose ssi; insulin in TPN per PharmD     Urinary incontinence  UA negative 4/2  - continue pure wick      Hypokalemia  - K protocol     GBM s/p resection 2/23/21  Followed by Dr. Baker of Neuro-Oncology.  She is in process of being set up to start chemotherapy and XRT next week.   Appears to have been on prolonged steroids following neurosurgery as was discharged without plan for taper and initiated taper only recently in follow up with Dr. Baker.  - now on prednisone as above with plan for prolonged taper  - has not yet initiated chemo (temozolomide) and planned radiation     Hypertension  - continue PTA amlodipine     Asthma  - albuterol as above     Depression and anxiety  Insomnia  - continue PTA Prozac and Buspar  - continue PTA Atarax for sleep  - prn lorazepam     GERD  - continue PTA Pepcid     Chronic anemia  Baseline hgb 10-11 g/dL, gradually trended slightly down this admission likely due to marrow suppression in acute illness.  - hgb stable 9-10 g/dL on 4/6     Non-severe malnutrition  Noted decrease oral intake and drop in albumin, subcutaneous fat loss on exam.   - PICC line in place  - continue TPN         Diet: Snacks/Supplements Adult: Boost Shake; Between Meals  Regular Diet Adult  parenteral nutrition - Clinimix E  Snacks/Supplements Adult: Magic  Cup; With Meals  Room Service    DVT Prophylaxis: therapeutic lovenox  Martino Catheter: not present  Code Status: Full Code           Disposition Plan   Expected discharge: 4 - 7 days, recommended to transitional care unit once O2 use less than 3 liters/minute.  Entered: Doug Almaguer MD 04/06/2021, 7:54 AM       The patient's care was discussed with the Bedside Nurse, Patient and Patient's Family.    Doug Almaguer MD  Hospitalist Service  [facility]  Contact information available via [facility] Paging/Directory    ______________________________________________________________________    Interval History   Feeling about the same today though states her cough is becoming looser.  Dyspnea unchanged, chest pressure slowly improving, denies fever/chills.  No other complaints.     Data reviewed today: I reviewed all medications, new labs and imaging results over the last 24 hours. I personally reviewed no images or EKG's today.    Physical Exam   Vital Signs: Temp: 97.2  F (36.2  C) Temp src: Oral BP: (!) 151/85 Pulse: 96   Resp: 24 SpO2: 94 % O2 Device: Nasal cannula with humidification Oxygen Delivery: 35 LPM  Weight: 156 lbs 8.43 oz  General Appearance: Thin female in NAD, lying gin bed  Respiratory: bilateral posterior crackles in middle and lower lobes unchanged  Cardiovascular: RRR, normal s1/s2 with grade 1/6 systolic murmur  GI: abdomen soft, nontender, normal bowel sounds  Other: Alert and appropriate, cranial nerves grossly intact     Data   Recent Labs   Lab 04/06/21  0600 04/05/21  0640 04/04/21  0811 04/02/21  0550 04/02/21  0550   WBC 16.1* 15.4* 12.7*   < > 10.9   HGB 8.8* 9.1* 10.2*   < > 9.9*   MCV 88 88 89   < > 88    214 223   < > 206   INR  --  1.02  --   --  1.00   * 130* 132*   < > 131*   POTASSIUM 4.7 4.7 4.5   < > 4.4   CHLORIDE 99 98 99   < > 98   CO2 27 26 28   < > 25   BUN 23 25 22   < > 20   CR 0.50* 0.50* 0.51*   < > 0.57   ANIONGAP 6 6 5   < > 8   ESTIVEN 8.9 8.7  8.8   < > 8.7   * 165* 138*   < > 181*   ALBUMIN  --  2.3*  --   --  2.4*   PROTTOTAL  --  5.8*  --   --  5.9*   BILITOTAL  --  0.3  --   --  0.3   ALKPHOS  --  90  --   --  78   ALT  --  28  --   --  27   AST  --  28  --   --  23    < > = values in this interval not displayed.

## 2021-04-06 NOTE — PROGRESS NOTES
04/06/21 1430   Quick Adds   Type of Visit Initial PT Evaluation   Living Environment   People in home alone  (going to stay with daughter)   Current Living Arrangements house   Home Accessibility stairs to enter home   Number of Stairs, Main Entrance 3   Stair Railings, Main Entrance none  (deep steps; could use walker)   Number of Stairs, Within Home, Primary other (see comments)  (14 to 2nd level; can stay on main)   Transportation Anticipated family or friend will provide   Living Environment Comments daughter planning on taking patient to her home if not total cares   Self-Care   Usual Activity Tolerance good   Current Activity Tolerance poor   Equipment Currently Used at Home cane, quad;walker, rolling   Activity/Exercise/Self-Care Comment was mod I using cane; living with daughter after ARU stay   Disability/Function   Fall history within last six months yes   Number of times patient has fallen within last six months 1   Change in Functional Status Since Onset of Current Illness/Injury yes   General Information   Onset of Illness/Injury or Date of Surgery 03/26/21   Referring Physician Doug Bob MD   Patient/Family Therapy Goals Statement (PT) return to daughter's home   Pertinent History of Current Problem (include personal factors and/or comorbidities that impact the POC) per chart: Olga Bailey is a 75 year old female admitted on 3/26/2021. past medical history that is most notable for recent craniotomy and resection of glioblastoma multiforme, as well as uncomplicated asthma, who presents with dyspnea and is found to have acute bilateral pneumonia; on bipap at night hiflow during the day   Existing Precautions/Restrictions fall   General Observations patient in bed; daughter present for session; plans to have patient stay with her at discharge; leans R; R neglect when tired per daughter   Cognition   Orientation Status (Cognition) oriented x 3   Memory Deficit (Cognition) other (see  comments)  (oriented; defer to OT for further testing as needed)   Pain Assessment   Patient Currently in Pain No   Integumentary/Edema   Integumentary/Edema Comments PICC;    Posture    Posture Comments leans R in sitting   Range of Motion (ROM)   ROM Comment WFL   Strength   Strength Comments significant functional weakness; poor activity tolerance; some residual R UE weakness per daughter after craniotomy   Bed Mobility   Comment (Bed Mobility) min/CGA and line management; HOB elevated; significant use of bedrail   Transfers   Transfer Safety Comments unable to tolerate secondary to fatigue; up in chair with nursing for meals prior with assist of 1-2   Gait/Stairs (Locomotion)   Comment (Gait/Stairs) unable to tolerate at time of eval   Balance   Balance Comments leans R in sitting but able to come to erect sitting with cues   Clinical Impression   Criteria for Skilled Therapeutic Intervention yes, treatment indicated   PT Diagnosis (PT) impaired gait/transfers   Influenced by the following impairments weakness; impaired balance; decreased activity tolerance; need for Bipap and hiflow O2   Functional limitations due to impairments impaired independence with functional mobility   Clinical Presentation Evolving/Changing   Clinical Presentation Rationale clinical judgement; level of assist   Clinical Decision Making (Complexity) moderate complexity   Therapy Frequency (PT) Daily   Predicted Duration of Therapy Intervention (days/wks) 1 week   Planned Therapy Interventions (PT) balance training;bed mobility training;gait training;strengthening;stair training;transfer training   Anticipated Equipment Needs at Discharge (PT) wheelchair;walker, rolling   Risk & Benefits of therapy have been explained evaluation/treatment results reviewed;care plan/treatment goals reviewed;risks/benefits reviewed;current/potential barriers reviewed;participants voiced agreement with care plan;participants included;patient   Clinical  Impression Comments Patient significantly below recent baseline since discharge from ARU   PT Discharge Planning    PT Discharge Recommendation (DC Rec) home with assist;home with home care physical therapy   PT Rationale for DC Rec Patient significantly below recent baseline after stay at ARU; poor activity tolerance and weakness and requiring significant assist for mobility and cares; daughter plans to take her home if patient is min A or less for mobility and cares; daughter is an RN; patient needs to be able to do 3 steps to access home; open to Home PT at discharge   PT Brief overview of current status  poor activity tolerance; desats and increased RR with sitting at EOB this date   Total Evaluation Time   Total Evaluation Time (Minutes) 10

## 2021-04-07 ENCOUNTER — APPOINTMENT (OUTPATIENT)
Dept: PHYSICAL THERAPY | Facility: CLINIC | Age: 76
DRG: 166 | End: 2021-04-07
Attending: NURSE PRACTITIONER
Payer: MEDICARE

## 2021-04-07 LAB
ANION GAP SERPL CALCULATED.3IONS-SCNC: 6 MMOL/L (ref 3–14)
BUN SERPL-MCNC: 22 MG/DL (ref 7–30)
CALCIUM SERPL-MCNC: 8.6 MG/DL (ref 8.5–10.1)
CHLORIDE SERPL-SCNC: 95 MMOL/L (ref 94–109)
CO2 SERPL-SCNC: 27 MMOL/L (ref 20–32)
CREAT SERPL-MCNC: 0.53 MG/DL (ref 0.52–1.04)
ERYTHROCYTE [DISTWIDTH] IN BLOOD BY AUTOMATED COUNT: 14.4 % (ref 10–15)
GFR SERPL CREATININE-BSD FRML MDRD: >90 ML/MIN/{1.73_M2}
GLUCOSE BLDC GLUCOMTR-MCNC: 112 MG/DL (ref 70–99)
GLUCOSE BLDC GLUCOMTR-MCNC: 133 MG/DL (ref 70–99)
GLUCOSE BLDC GLUCOMTR-MCNC: 140 MG/DL (ref 70–99)
GLUCOSE BLDC GLUCOMTR-MCNC: 175 MG/DL (ref 70–99)
GLUCOSE BLDC GLUCOMTR-MCNC: 184 MG/DL (ref 70–99)
GLUCOSE BLDC GLUCOMTR-MCNC: 188 MG/DL (ref 70–99)
GLUCOSE SERPL-MCNC: 207 MG/DL (ref 70–99)
HCT VFR BLD AUTO: 26.5 % (ref 35–47)
HGB BLD-MCNC: 9.9 G/DL (ref 11.7–15.7)
MAGNESIUM SERPL-MCNC: 2.3 MG/DL (ref 1.6–2.3)
MCH RBC QN AUTO: 33.1 PG (ref 26.5–33)
MCHC RBC AUTO-ENTMCNC: 37.4 G/DL (ref 31.5–36.5)
MCV RBC AUTO: 89 FL (ref 78–100)
OSMOLALITY SERPL: 290 MMOL/KG (ref 280–301)
OSMOLALITY UR: 437 MMOL/KG (ref 100–1200)
PHOSPHATE SERPL-MCNC: 3.6 MG/DL (ref 2.5–4.5)
PLATELET # BLD AUTO: 228 10E9/L (ref 150–450)
POTASSIUM SERPL-SCNC: 5.2 MMOL/L (ref 3.4–5.3)
RBC # BLD AUTO: 2.99 10E12/L (ref 3.8–5.2)
SODIUM SERPL-SCNC: 128 MMOL/L (ref 133–144)
SODIUM UR-SCNC: 50 MMOL/L
WBC # BLD AUTO: 15.5 10E9/L (ref 4–11)

## 2021-04-07 PROCEDURE — 83935 ASSAY OF URINE OSMOLALITY: CPT | Performed by: HOSPITALIST

## 2021-04-07 PROCEDURE — 250N000009 HC RX 250: Performed by: HOSPITALIST

## 2021-04-07 PROCEDURE — 250N000013 HC RX MED GY IP 250 OP 250 PS 637: Performed by: INTERNAL MEDICINE

## 2021-04-07 PROCEDURE — 84100 ASSAY OF PHOSPHORUS: CPT | Performed by: INTERNAL MEDICINE

## 2021-04-07 PROCEDURE — 85027 COMPLETE CBC AUTOMATED: CPT | Performed by: INTERNAL MEDICINE

## 2021-04-07 PROCEDURE — 97110 THERAPEUTIC EXERCISES: CPT | Mod: GP | Performed by: PHYSICAL THERAPIST

## 2021-04-07 PROCEDURE — 94660 CPAP INITIATION&MGMT: CPT

## 2021-04-07 PROCEDURE — 94664 DEMO&/EVAL PT USE INHALER: CPT

## 2021-04-07 PROCEDURE — 250N000013 HC RX MED GY IP 250 OP 250 PS 637: Performed by: HOSPITALIST

## 2021-04-07 PROCEDURE — 83930 ASSAY OF BLOOD OSMOLALITY: CPT | Performed by: INTERNAL MEDICINE

## 2021-04-07 PROCEDURE — 83735 ASSAY OF MAGNESIUM: CPT | Performed by: INTERNAL MEDICINE

## 2021-04-07 PROCEDURE — 120N000001 HC R&B MED SURG/OB

## 2021-04-07 PROCEDURE — 250N000011 HC RX IP 250 OP 636: Performed by: HOSPITALIST

## 2021-04-07 PROCEDURE — 250N000012 HC RX MED GY IP 250 OP 636 PS 637: Performed by: HOSPITALIST

## 2021-04-07 PROCEDURE — 999N000157 HC STATISTIC RCP TIME EA 10 MIN

## 2021-04-07 PROCEDURE — 94640 AIRWAY INHALATION TREATMENT: CPT | Mod: 76

## 2021-04-07 PROCEDURE — 99232 SBSQ HOSP IP/OBS MODERATE 35: CPT | Performed by: HOSPITALIST

## 2021-04-07 PROCEDURE — 94640 AIRWAY INHALATION TREATMENT: CPT

## 2021-04-07 PROCEDURE — 258N000003 HC RX IP 258 OP 636: Performed by: HOSPITALIST

## 2021-04-07 PROCEDURE — 999N001017 HC STATISTIC GLUCOSE BY METER IP

## 2021-04-07 PROCEDURE — 84300 ASSAY OF URINE SODIUM: CPT | Performed by: HOSPITALIST

## 2021-04-07 PROCEDURE — 250N000009 HC RX 250: Performed by: INTERNAL MEDICINE

## 2021-04-07 PROCEDURE — 120N000013 HC R&B IMCU

## 2021-04-07 PROCEDURE — 250N000011 HC RX IP 250 OP 636: Performed by: INTERNAL MEDICINE

## 2021-04-07 PROCEDURE — 97530 THERAPEUTIC ACTIVITIES: CPT | Mod: GP | Performed by: PHYSICAL THERAPIST

## 2021-04-07 PROCEDURE — 258N000001 HC RX 258: Performed by: HOSPITALIST

## 2021-04-07 PROCEDURE — 80048 BASIC METABOLIC PNL TOTAL CA: CPT | Performed by: INTERNAL MEDICINE

## 2021-04-07 RX ORDER — OXYMETAZOLINE HYDROCHLORIDE 0.05 G/100ML
3 SPRAY NASAL 2 TIMES DAILY PRN
Status: DISCONTINUED | OUTPATIENT
Start: 2021-04-07 | End: 2021-04-17

## 2021-04-07 RX ADMIN — FAMOTIDINE 20 MG: 20 TABLET ORAL at 20:12

## 2021-04-07 RX ADMIN — I.V. FAT EMULSION 250 ML: 20 EMULSION INTRAVENOUS at 20:15

## 2021-04-07 RX ADMIN — ALBUTEROL SULFATE 2.5 MG: 2.5 SOLUTION RESPIRATORY (INHALATION) at 15:40

## 2021-04-07 RX ADMIN — SULFAMETHOXAZOLE AND TRIMETHOPRIM 320 MG: 80; 16 INJECTION, SOLUTION, CONCENTRATE INTRAVENOUS at 00:03

## 2021-04-07 RX ADMIN — FAMOTIDINE 20 MG: 20 TABLET ORAL at 10:30

## 2021-04-07 RX ADMIN — SULFAMETHOXAZOLE AND TRIMETHOPRIM 320 MG: 80; 16 INJECTION, SOLUTION, CONCENTRATE INTRAVENOUS at 08:57

## 2021-04-07 RX ADMIN — ALBUTEROL SULFATE 2.5 MG: 2.5 SOLUTION RESPIRATORY (INHALATION) at 23:47

## 2021-04-07 RX ADMIN — SULFAMETHOXAZOLE AND TRIMETHOPRIM 320 MG: 80; 16 INJECTION, SOLUTION, CONCENTRATE INTRAVENOUS at 16:58

## 2021-04-07 RX ADMIN — ENOXAPARIN SODIUM 70 MG: 80 INJECTION SUBCUTANEOUS at 21:36

## 2021-04-07 RX ADMIN — AMLODIPINE BESYLATE 10 MG: 10 TABLET ORAL at 10:30

## 2021-04-07 RX ADMIN — DEXTROSE MONOHYDRATE 250 ML: 100 INJECTION, SOLUTION INTRAVENOUS at 06:43

## 2021-04-07 RX ADMIN — INSULIN ASPART 2 UNITS: 100 INJECTION, SOLUTION INTRAVENOUS; SUBCUTANEOUS at 00:02

## 2021-04-07 RX ADMIN — ALBUTEROL SULFATE 2.5 MG: 2.5 SOLUTION RESPIRATORY (INHALATION) at 11:15

## 2021-04-07 RX ADMIN — ASCORBIC ACID, VITAMIN A PALMITATE, CHOLECALCIFEROL, THIAMINE HYDROCHLORIDE, RIBOFLAVIN-5 PHOSPHATE SODIUM, PYRIDOXINE HYDROCHLORIDE, NIACINAMIDE, DEXPANTHENOL, ALPHA-TOCOPHEROL ACETATE, VITAMIN K1, FOLIC ACID, BIOTIN, CYANOCOBALAMIN: 200; 3300; 200; 6; 3.6; 6; 40; 15; 10; 150; 600; 60; 5 INJECTION, SOLUTION INTRAVENOUS at 08:14

## 2021-04-07 RX ADMIN — INSULIN ASPART 1 UNITS: 100 INJECTION, SOLUTION INTRAVENOUS; SUBCUTANEOUS at 16:58

## 2021-04-07 RX ADMIN — INSULIN ASPART 2 UNITS: 100 INJECTION, SOLUTION INTRAVENOUS; SUBCUTANEOUS at 04:23

## 2021-04-07 RX ADMIN — FLUOXETINE 80 MG: 20 CAPSULE ORAL at 10:31

## 2021-04-07 RX ADMIN — BUSPIRONE HYDROCHLORIDE 30 MG: 15 TABLET ORAL at 10:30

## 2021-04-07 RX ADMIN — PREDNISONE 40 MG: 20 TABLET ORAL at 10:30

## 2021-04-07 RX ADMIN — PREDNISONE 40 MG: 20 TABLET ORAL at 17:57

## 2021-04-07 RX ADMIN — ALBUTEROL SULFATE 2.5 MG: 2.5 SOLUTION RESPIRATORY (INHALATION) at 07:35

## 2021-04-07 RX ADMIN — INSULIN ASPART 2 UNITS: 100 INJECTION, SOLUTION INTRAVENOUS; SUBCUTANEOUS at 20:26

## 2021-04-07 RX ADMIN — HYDROXYZINE HYDROCHLORIDE 50 MG: 25 TABLET, FILM COATED ORAL at 10:31

## 2021-04-07 RX ADMIN — HYDROXYZINE HYDROCHLORIDE 50 MG: 25 TABLET, FILM COATED ORAL at 20:13

## 2021-04-07 RX ADMIN — BUSPIRONE HYDROCHLORIDE 30 MG: 15 TABLET ORAL at 20:11

## 2021-04-07 RX ADMIN — ENOXAPARIN SODIUM 70 MG: 80 INJECTION SUBCUTANEOUS at 10:35

## 2021-04-07 ASSESSMENT — MIFFLIN-ST. JEOR: SCORE: 1168.13

## 2021-04-07 ASSESSMENT — ACTIVITIES OF DAILY LIVING (ADL)
ADLS_ACUITY_SCORE: 22

## 2021-04-07 NOTE — PLAN OF CARE
IMC status. Pt is A&Ox4, VSS ex HTN (not high enough to meet PRN parameters). Pt tolerated Bipap overnight with FiO2 of 45%. HFNC during day. TPN and lips infusing. LS clear in upper lobes, diminished in lower lobes, JACOBSEN. BS+ active, flatus+. Purewick in place overnight, uses BSC during day. Adequate UOP. Pt is assistx1-2, NPO while on Bipap. BG checks q4h. Tele: NSR, denies pain. Pt turns self independently in bed, on pulsate mattress, coccyx/sacrum no redness. Nose bleed has subsided from earlier yesterday. Small amount of blood present at times, pt applies aquaphor to R nostril frequently.

## 2021-04-07 NOTE — PLAN OF CARE
IMC Status. A/Ox4. VSS ex HTN, tachypnea and tachycardic on high flow. JACOBSEN. LS: dimished. BS: active, no BM.Tele: NSR. Up Ax1, GB/walker. TPN @ 60 mL/hr. PICC x3 - WNL. Utilizing pure wick overnight and bedside commode during the day. B, 140, & 200. Tolerating a regular diet. OOB to chair x3. Pulsate mattress placed on bed. Epistaxis x1 - afrin ordered PRN. Plan pending clinical improvement. Daughter visited today - wants up for all meals. Will continue to monitor.

## 2021-04-07 NOTE — PROGRESS NOTES
[facility]    Medicine Progress Note - Hospitalist Service       Date of Admission:  3/26/2021  Assessment & Plan           Olga Bailey is a 75 year old female admitted on 3/26/2021. past medical history that is most notable for recent craniotomy and resection of glioblastoma multiforme, as well as uncomplicated asthma, who presents with dyspnea and is found to have acute bilateral pneumonia.       Acute hypoxic respiratory failure due to bilateral pneumonia, suspected PJP  * Presents with progressive dyspnea.  Afebrile without leukocytosis but left shift and lymphopenia noted on differential.  Admission CT showing bilateral patchy groundglass opacities, negative for PE.  Initially treated with ceftriaxone and doxy.   * COVID19 negative 3/26 and 3/29.    * Procal low 3/28 and 3/29  * Increased oxygen needs 3/29 (ultimately requiring Bipap) with repeat CXR showing increase in bilateral patchy opacities  * Repeat COVID, procal, trop, BNP negative, resp viral panel negative, aspergillus studies, EKG unremarkable on 3/29  * TTE 3/29 with EF 50-55% without WMA, mildly decreased RV systolic function with mild dilation.  * Found to have DVT 3/29 but with normal hemodynamics, normal trop and BNP, no significant RV strain on echo and risk for renal injury with contrast and initiation of high dose Bactrim, did not pursue CT imaging given very low concern for saddle embolus with need for thrombectomy.  * ID consulted 3/29, suspect PJP with prolonged steroid course following neurosurgery (see below).  * Switched from ceftriaxone to zosyn 3/29 and initiated on high dose Bactrim and increased steroids  * Completed 5-day course of doxycycline 4/1 and 7-day course of zosyn 4/4  * 1, 3 beta glucan and fungitell or positive and likely indicative of PJP    - currently on high dose Bactrim (dose 9/21) and prednisone 40 mg bid  - albuterol q4h while awake  - continue alternating Bipap and HFNC; wean as  able  - so far unable to produce any sputum for confirmatory culture  - will require repeat CT in early May to reassess infiltrates per Pulm  - Pulm and ID recs much appreciated     Bilateral LE DVT  US 3/29 showing occlusive DVT of bilateral posterior tibial veins and soleal veins to mid calf.  - continue on therapeutic lovenox     Epistaxis  Has experienced mild intermittent epistaxis while on therapeutic anticoagulation.  - conservative measures: humidified air, cold packs, pressure  - use Afrin prn to effected nostril when above measures ineffective    Hyponatremia  Possible due to SIADH due to pulmonary process.  Does not appear volume overloaded.   - Na down slightly at 128 but relatively stable; will check urine studies to assess for SIADH     Steroid induced hyperglycemia  - continue high dose ssi; insulin in TPN per PharmD     Urinary incontinence  UA negative 4/2  - continue pure wick      Hypokalemia, resolved     GBM s/p resection 2/23/21  Followed by Dr. Baker of Neuro-Oncology.  She is in process of being set up to start chemotherapy and XRT next week.   Appears to have been on prolonged steroids following neurosurgery as was discharged without plan for taper and initiated taper only recently in follow up with Dr. Baker.  - now on prednisone as above with plan for prolonged taper  - has not yet initiated chemo (temozolomide) and planned radiation     Hypertension  - continue PTA amlodipine     Asthma  - albuterol as above     Depression and anxiety  Insomnia  - continue PTA Prozac and Buspar  - continue PTA Atarax for sleep  - prn lorazepam     GERD  - continue PTA Pepcid     Chronic anemia  Baseline hgb 10-11 g/dL, gradually trended slightly down this admission likely due to marrow suppression in acute illness.  - hgb stable 9-10 g/dL on 4/7     Non-severe malnutrition  Noted decrease oral intake and drop in albumin, subcutaneous fat loss on exam.   - PICC line in place  - continue TPN         Diet:  Snacks/Supplements Adult: Boost Shake; Between Meals  Regular Diet Adult  parenteral nutrition - Clinimix E  Snacks/Supplements Adult: Magic Cup; With Meals  Room Service    DVT Prophylaxis: therapeutic lovenox  Martino Catheter: not present  Code Status: Full Code           Disposition Plan   Expected discharge: 4 - 7 days, recommended to transitional care unit once O2 use less than 3 liters/minute.  Entered: Doug Almaguer MD 04/07/2021, 7:46 AM       The patient's care was discussed with the Bedside Nurse, Patient and Patient's Family.    Doug Almaguer MD  Hospitalist Service  [facility]  Contact information available via [facility] Paging/Directory    ______________________________________________________________________    Interval History   Epistaxis from last evening has resolved.  Still on Bipap this morning but reports no increase in dyspnea.  No fever/chills, no edema, moving bowels ok.     Data reviewed today: I reviewed all medications, new labs and imaging results over the last 24 hours. I personally reviewed no images or EKG's today.    Physical Exam   Vital Signs: Temp: 98.5  F (36.9  C) Temp src: Axillary BP: (!) 154/96 Pulse: 89   Resp: 19 SpO2: 100 % O2 Device: BiPAP/CPAP Oxygen Delivery: 35 LPM  Weight: 155 lbs 3.26 oz     General Appearance: Thin female in NAD  Respiratory: bilateral posterior crackles in middle and lower lobes unchanged, no tachypnea on Bipap  Cardiovascular: RRR, normal s1/s2 with grade 1/6 systolic murmur, no peripheral edema  GI: abdomen soft, nontender, normal bowel sounds  Other: Alert and appropriate, cranial nerves grossly intact     Data   Recent Labs   Lab 04/07/21  0555 04/06/21  0600 04/05/21  0640 04/02/21  0550 04/02/21  0550   WBC 15.5* 16.1* 15.4*   < > 10.9   HGB 9.9* 8.8* 9.1*   < > 9.9*   MCV 89 88 88   < > 88    223 214   < > 206   INR  --   --  1.02  --  1.00   * 132* 130*   < > 131*   POTASSIUM 5.2 4.7 4.7   < > 4.4   CHLORIDE  95 99 98   < > 98   CO2 27 27 26   < > 25   BUN 22 23 25   < > 20   CR 0.53 0.50* 0.50*   < > 0.57   ANIONGAP 6 6 6   < > 8   ESTIVEN 8.6 8.9 8.7   < > 8.7   * 132* 165*   < > 181*   ALBUMIN  --   --  2.3*  --  2.4*   PROTTOTAL  --   --  5.8*  --  5.9*   BILITOTAL  --   --  0.3  --  0.3   ALKPHOS  --   --  90  --  78   ALT  --   --  28  --  27   AST  --   --  28  --  23    < > = values in this interval not displayed.

## 2021-04-07 NOTE — PROGRESS NOTES
[facility]    Infectious Disease Progress Note    Date of Service (when I saw the patient): 04/07/2021     Assessment & Plan   Olga Bailey is a 75 year old female who was admitted on 3/26/2021.     Impression:  1. 75 y.o with recent diagnosis of craniotomy and resection of glioblastoma multiforme.   2. Has been on very high dose steroid taper for the past month since the craniotomy. Not on any bactrim prophylaxis before admission.    3. Admitted with shortness of breath.   4. Found to have bilateral ground glass infiltrates on the imaging, very hypoxic.   5. COVID PCR negative x 2.   6. No leucocytosis, procal not elevated.      Recommendations:   High suspicion for PJP pneumonia. LDH high, high beta d glucan unable to confirm with the sputum studies as patient has been unable to provide any sputum   DC zosyn )  And on treatment dose for PJP as we wait on sputum cultures.   Steroids for PJP, taper as able      Day 9/21 septra slow improvement to be expected   Obviously with not proven infection conceivably second org, or not PJP but no sign of either, so continued close monitor      Donny Sanabria MD    Interval History   On  high flow comfortable  No fever   No new micro   No new complaints       Physical Exam   Temp: 98.5  F (36.9  C) Temp src: Axillary BP: (!) 144/96 Pulse: 96   Resp: 26 SpO2: 99 % O2 Device: High Flow Nasal Cannula (HFNC) Oxygen Delivery: 35 LPM  Vitals:    04/05/21 0400 04/06/21 0600 04/07/21 0656   Weight: 70 kg (154 lb 6.4 oz) 71 kg (156 lb 8.4 oz) 70.4 kg (155 lb 3.3 oz)     Vital Signs with Ranges  Temp:  [97.9  F (36.6  C)-98.5  F (36.9  C)] 98.5  F (36.9  C)  Pulse:  [] 96  Resp:  [17-28] 26  BP: (127-154)/(66-96) 144/96  FiO2 (%):  [45 %-80 %] 70 %  SpO2:  [92 %-100 %] 99 %    Constitutional: Awake, alert  Lungs: diminished bilateral   Cardiovascular: Regular rate and rhythm, normal S1 and S2, and no murmur noted  Abdomen: Normal bowel sounds, soft,  non-distended, non-tender  Skin: No rashes, no cyanosis, no edema  Other:    Medications     dextrose       dextrose 250 mL (04/07/21 0643)     - MEDICATION INSTRUCTIONS -       parenteral nutrition - Clinimix E 60 mL/hr at 04/07/21 0814       albuterol  2.5 mg Nebulization Q4H While awake     amLODIPine  10 mg Oral Daily     busPIRone HCl  30 mg Oral BID     enoxaparin ANTICOAGULANT  70 mg Subcutaneous Q12H     famotidine  20 mg Oral BID     FLUoxetine  80 mg Oral Daily     insulin aspart  1-12 Units Subcutaneous Q4H     lipids  250 mL Intravenous Q24H     predniSONE  40 mg Oral BID w/meals     sodium chloride (PF)  3 mL Intracatheter Q8H     sulfamethoxazole-trimethoprim  320 mg Intravenous Q8H       Data   All microbiology laboratory data reviewed.  Recent Labs   Lab Test 04/07/21  0555 04/06/21  0600 04/05/21  0640   WBC 15.5* 16.1* 15.4*   HGB 9.9* 8.8* 9.1*   HCT 26.5* 27.5* 28.1*   MCV 89 88 88    223 214     Recent Labs   Lab Test 04/07/21  0555 04/06/21  0600 04/05/21  0640   CR 0.53 0.50* 0.50*     No lab results found.  Recent Labs   Lab Test 03/29/21 1951 03/29/21 1946   CULT No growth No growth       Attestation:  Total time on the floor involved in the patient's care: 35 minutes. Total time spent in counseling/care coordination: >50%

## 2021-04-07 NOTE — PROGRESS NOTES
"PULMONOLOGY PROGRESS NOTE    Date of Admission: 3/26/2021    CC/Reason for Hospital visit: Acute hypoxic respiratory failure, suspected PJP  SUBJECTIVE      Events of last night reviewed. On BiPAP overnight. Stable night. No new respiratory problems. States breathing is slightly better today. Up in the chair.    ROS: A Problem Pertinent review of systems was negative except for items noted in HPI.  Past Medical, Family, and Social/Substance History has been reviewed: No interval changes.    OBJECTIVE   Vital signs:  Temp: 99.7  F (37.6  C) Temp src: Oral BP: (!) 147/77 Pulse: 106   Resp: 24 SpO2: 92 % O2 Device: High Flow Nasal Cannula (HFNC) Oxygen Delivery: 40 LPM Height: 160 cm (5' 3\") Weight: 68.1 kg (150 lb 2.1 oz)  Estimated body mass index is 26.59 kg/m  as calculated from the following:    Height as of this encounter: 1.6 m (5' 3\").    Weight as of this encounter: 68.1 kg (150 lb 2.1 oz).        I/O last 3 completed shifts:  In: 3073.55 [P.O.:240; I.V.:600]  Out: 1500 [Urine:1300; Emesis/NG output:200]      CONSTITUTIONAL/GENERAL APPEARANCE: Alert female. No Apparent Distress.  PSYCHIATRIC: Pleasant and appropriate mood and affect. Oriented x 3.  EARS, NOSE,THROAT,MOUTH: External ears and nose overall normal. Normal oral mucosa.   NECK: Neck appearance normal. No neck masses and the thyroid is not enlarged.   RESPIRATORY: Non-labored effort. Decreased BS, sl coarse, no change.  CARDIOVASCULAR: S1, S2, regular rate and rhythm.    LABORATORY ASSESSMENT    Arterial Blood Gas  Recent Labs   Lab 04/04/21  0800 04/02/21  0615 03/31/21  1440 03/29/21  2235   PH 7.45 7.49* 7.49* 7.51*   PCO2 40 35 34* 35   PO2 74* 83 61* 67*   HCO3 28 26 25 28   O2PER 40% 50% 60 50     CBC  Recent Labs   Lab 04/04/21  0811 04/03/21  0527 04/02/21  0550 04/01/21  0500   WBC 12.7* 12.1* 10.9 10.5   RBC 3.52* 3.41* 3.46* 3.46*   HGB 10.2* 9.8* 9.9* 9.8*   HCT 31.2* 30.2* 30.4* 30.4*   MCV 89 89 88 88   MCH 29.0 28.7 28.6 28.3   MCHC " 32.7 32.5 32.6 32.2   RDW 14.4 14.6 14.7 14.6    205 206 185     BMP  Recent Labs   Lab 04/04/21  0811 04/04/21  0518 04/03/21  0527 04/02/21  0550   * 131* 130* 131*   POTASSIUM 4.5 4.6 4.7 4.4   CHLORIDE 99 98 98 98   ESTIVEN 8.8 8.5 8.8 8.7   CO2 28 27 28 25   BUN 22 22 26 20   CR 0.51* 0.55 0.51* 0.57   * 183* 163* 181*     INR  Recent Labs   Lab 04/02/21  0550   INR 1.00      BNPNo lab results found in last 7 days.  VENOUS BLOOD GASESNo lab results found in last 7 days.      Additional labs and/or comments:    IMAGING      CXR 4/5 -  IMPRESSION: Mixed interstitial and airspace disease probably similar  to previous given slight differences in technique. PICC line stable.    CXR 4/2 -  IMPRESSION: Right PICC tip at the superior cavoatrial junction. There are persistent patchy infiltrates of both lungs consistent with infectious/inflammatory pneumonitis. No significant pleural effusion. No pneumothorax. Stable cardiac silhouette.    PFT & OTHER TESTING       ASSESSMENT / PLAN      Pulmonary diagnoses:  Abnl CT/CXR R91.8  Asthma unspec J45.909  Hypoxemia R09.02  Resp fail acute J96.00    Additional COVID-19 diagnoses:  Concern of possible exposure to COVID-19, Now RULED OUT Z03.818    ASSESSMENT: 75-year-old female with a history of asthma and obstructive sleep apnea on CPAP, recent diagnosis of glioblastoma status post resection 2/23/21 admitted with progressive dyspnea. Admission CT Chest showed bilateral patchy groundglass opacities, negative for PE.  Initially treated with ceftriaxone and doxy. COVID19 negative 3/26 and 3/29.  Increased oxygen needs 3/29, ultimately requiring BiPAP, with repeat CXR showing increase in bilateral patchy opacities.  Found to have DVT 3/29.  ID consulted 3/29, high suspicion for PJP pneumonia (LDH high, high beta d glucan), unable to confirm with the sputum studies as patient has been unable to provide any sputum.  PJP suspected secondary to prolonged steroid course  following neurosurgery, switched from ceftriaxone to zosyn 3/29 and initiated on high dose Bactrim and increased steroids. Follow-up CXRs 4/2 and 4/5 show persistent patchy infiltrates bilaterally. Continues to require HFNC during the day and BiPAP at night. O2 requirements sl decreased from yesterday, continue present rx for now.    PLAN:  1. Adjust oxygen, keep SaO2 > 90%  2. BiPAP at night and prn during the day.  3. Bronchodilators - Albuterol nebs  4. Antibiotics - Bactrim.  5. Steroids - Prednisone.  6. Anticoagulation - Lovenox.  7. Increase activity and IS as tolerated.      Holden Jaime M.D.    [facility] [place]  [phone]   (pager)  [phone]   (office)

## 2021-04-08 ENCOUNTER — APPOINTMENT (OUTPATIENT)
Dept: PHYSICAL THERAPY | Facility: CLINIC | Age: 76
DRG: 166 | End: 2021-04-08
Attending: NURSE PRACTITIONER
Payer: MEDICARE

## 2021-04-08 LAB
ANION GAP SERPL CALCULATED.3IONS-SCNC: 5 MMOL/L (ref 3–14)
BASE EXCESS BLDV CALC-SCNC: 0.9 MMOL/L
BUN SERPL-MCNC: 22 MG/DL (ref 7–30)
CALCIUM SERPL-MCNC: 9 MG/DL (ref 8.5–10.1)
CHLORIDE SERPL-SCNC: 99 MMOL/L (ref 94–109)
CO2 SERPL-SCNC: 28 MMOL/L (ref 20–32)
CREAT SERPL-MCNC: 0.56 MG/DL (ref 0.52–1.04)
ERYTHROCYTE [DISTWIDTH] IN BLOOD BY AUTOMATED COUNT: 14.6 % (ref 10–15)
ERYTHROCYTE [DISTWIDTH] IN BLOOD BY AUTOMATED COUNT: 14.6 % (ref 10–15)
GFR SERPL CREATININE-BSD FRML MDRD: >90 ML/MIN/{1.73_M2}
GLUCOSE BLDC GLUCOMTR-MCNC: 124 MG/DL (ref 70–99)
GLUCOSE BLDC GLUCOMTR-MCNC: 152 MG/DL (ref 70–99)
GLUCOSE BLDC GLUCOMTR-MCNC: 152 MG/DL (ref 70–99)
GLUCOSE BLDC GLUCOMTR-MCNC: 161 MG/DL (ref 70–99)
GLUCOSE BLDC GLUCOMTR-MCNC: 165 MG/DL (ref 70–99)
GLUCOSE BLDC GLUCOMTR-MCNC: 196 MG/DL (ref 70–99)
GLUCOSE SERPL-MCNC: 130 MG/DL (ref 70–99)
HCO3 BLDV-SCNC: 25 MMOL/L (ref 21–28)
HCT VFR BLD AUTO: 27.6 % (ref 35–47)
HCT VFR BLD AUTO: 27.7 % (ref 35–47)
HGB BLD-MCNC: 8.8 G/DL (ref 11.7–15.7)
HGB BLD-MCNC: 8.9 G/DL (ref 11.7–15.7)
MCH RBC QN AUTO: 28.3 PG (ref 26.5–33)
MCH RBC QN AUTO: 28.6 PG (ref 26.5–33)
MCHC RBC AUTO-ENTMCNC: 31.9 G/DL (ref 31.5–36.5)
MCHC RBC AUTO-ENTMCNC: 32.1 G/DL (ref 31.5–36.5)
MCV RBC AUTO: 88 FL (ref 78–100)
MCV RBC AUTO: 90 FL (ref 78–100)
OXYHGB MFR BLDV: 90 %
PCO2 BLDV: 35 MM HG (ref 40–50)
PH BLDV: 7.46 PH (ref 7.32–7.43)
PHOSPHATE SERPL-MCNC: 3.4 MG/DL (ref 2.5–4.5)
PLATELET # BLD AUTO: 220 10E9/L (ref 150–450)
PLATELET # BLD AUTO: 230 10E9/L (ref 150–450)
PO2 BLDV: 57 MM HG (ref 25–47)
POTASSIUM SERPL-SCNC: 4.5 MMOL/L (ref 3.4–5.3)
RBC # BLD AUTO: 3.08 10E12/L (ref 3.8–5.2)
RBC # BLD AUTO: 3.14 10E12/L (ref 3.8–5.2)
SODIUM SERPL-SCNC: 132 MMOL/L (ref 133–144)
WBC # BLD AUTO: 15 10E9/L (ref 4–11)
WBC # BLD AUTO: 15.7 10E9/L (ref 4–11)

## 2021-04-08 PROCEDURE — 94640 AIRWAY INHALATION TREATMENT: CPT

## 2021-04-08 PROCEDURE — 80048 BASIC METABOLIC PNL TOTAL CA: CPT | Performed by: HOSPITALIST

## 2021-04-08 PROCEDURE — 84100 ASSAY OF PHOSPHORUS: CPT | Performed by: HOSPITALIST

## 2021-04-08 PROCEDURE — 999N001017 HC STATISTIC GLUCOSE BY METER IP

## 2021-04-08 PROCEDURE — 999N000157 HC STATISTIC RCP TIME EA 10 MIN

## 2021-04-08 PROCEDURE — 93005 ELECTROCARDIOGRAM TRACING: CPT

## 2021-04-08 PROCEDURE — 97110 THERAPEUTIC EXERCISES: CPT | Mod: GP

## 2021-04-08 PROCEDURE — 250N000012 HC RX MED GY IP 250 OP 636 PS 637: Performed by: HOSPITALIST

## 2021-04-08 PROCEDURE — 999N000190 HC STATISTIC VAT ROUNDS

## 2021-04-08 PROCEDURE — 250N000009 HC RX 250: Performed by: HOSPITALIST

## 2021-04-08 PROCEDURE — 120N000013 HC R&B IMCU

## 2021-04-08 PROCEDURE — 250N000011 HC RX IP 250 OP 636: Performed by: INTERNAL MEDICINE

## 2021-04-08 PROCEDURE — 250N000011 HC RX IP 250 OP 636: Performed by: HOSPITALIST

## 2021-04-08 PROCEDURE — 94640 AIRWAY INHALATION TREATMENT: CPT | Mod: 76

## 2021-04-08 PROCEDURE — 82805 BLOOD GASES W/O2 SATURATION: CPT | Performed by: HOSPITALIST

## 2021-04-08 PROCEDURE — 250N000013 HC RX MED GY IP 250 OP 250 PS 637: Performed by: HOSPITALIST

## 2021-04-08 PROCEDURE — 36415 COLL VENOUS BLD VENIPUNCTURE: CPT | Performed by: HOSPITALIST

## 2021-04-08 PROCEDURE — 120N000001 HC R&B MED SURG/OB

## 2021-04-08 PROCEDURE — 85027 COMPLETE CBC AUTOMATED: CPT | Performed by: HOSPITALIST

## 2021-04-08 PROCEDURE — 250N000013 HC RX MED GY IP 250 OP 250 PS 637: Performed by: INTERNAL MEDICINE

## 2021-04-08 PROCEDURE — 250N000009 HC RX 250: Performed by: INTERNAL MEDICINE

## 2021-04-08 PROCEDURE — 258N000003 HC RX IP 258 OP 636: Performed by: HOSPITALIST

## 2021-04-08 PROCEDURE — 94664 DEMO&/EVAL PT USE INHALER: CPT

## 2021-04-08 PROCEDURE — 3E0436Z INTRODUCTION OF NUTRITIONAL SUBSTANCE INTO CENTRAL VEIN, PERCUTANEOUS APPROACH: ICD-10-PCS | Performed by: HOSPITALIST

## 2021-04-08 PROCEDURE — 99232 SBSQ HOSP IP/OBS MODERATE 35: CPT | Performed by: HOSPITALIST

## 2021-04-08 RX ORDER — METOPROLOL TARTRATE 1 MG/ML
2.5 INJECTION, SOLUTION INTRAVENOUS EVERY 4 HOURS PRN
Status: DISCONTINUED | OUTPATIENT
Start: 2021-04-08 | End: 2021-04-18

## 2021-04-08 RX ADMIN — SULFAMETHOXAZOLE AND TRIMETHOPRIM 320 MG: 80; 16 INJECTION, SOLUTION, CONCENTRATE INTRAVENOUS at 16:21

## 2021-04-08 RX ADMIN — ENOXAPARIN SODIUM 70 MG: 80 INJECTION SUBCUTANEOUS at 21:17

## 2021-04-08 RX ADMIN — HYDROXYZINE HYDROCHLORIDE 50 MG: 25 TABLET, FILM COATED ORAL at 21:17

## 2021-04-08 RX ADMIN — FLUOXETINE 80 MG: 20 CAPSULE ORAL at 08:32

## 2021-04-08 RX ADMIN — AMLODIPINE BESYLATE 10 MG: 10 TABLET ORAL at 08:32

## 2021-04-08 RX ADMIN — SULFAMETHOXAZOLE AND TRIMETHOPRIM 320 MG: 80; 16 INJECTION, SOLUTION, CONCENTRATE INTRAVENOUS at 00:05

## 2021-04-08 RX ADMIN — ALBUTEROL SULFATE 2.5 MG: 2.5 SOLUTION RESPIRATORY (INHALATION) at 18:57

## 2021-04-08 RX ADMIN — FAMOTIDINE 20 MG: 20 TABLET ORAL at 20:14

## 2021-04-08 RX ADMIN — ALBUTEROL SULFATE 2.5 MG: 2.5 SOLUTION RESPIRATORY (INHALATION) at 14:54

## 2021-04-08 RX ADMIN — PREDNISONE 40 MG: 20 TABLET ORAL at 08:32

## 2021-04-08 RX ADMIN — FAMOTIDINE 20 MG: 20 TABLET ORAL at 08:32

## 2021-04-08 RX ADMIN — INSULIN ASPART 2 UNITS: 100 INJECTION, SOLUTION INTRAVENOUS; SUBCUTANEOUS at 00:13

## 2021-04-08 RX ADMIN — INSULIN ASPART 3 UNITS: 100 INJECTION, SOLUTION INTRAVENOUS; SUBCUTANEOUS at 17:08

## 2021-04-08 RX ADMIN — ALBUTEROL SULFATE 2.5 MG: 2.5 SOLUTION RESPIRATORY (INHALATION) at 22:27

## 2021-04-08 RX ADMIN — BUSPIRONE HYDROCHLORIDE 30 MG: 15 TABLET ORAL at 20:14

## 2021-04-08 RX ADMIN — SULFAMETHOXAZOLE AND TRIMETHOPRIM 320 MG: 80; 16 INJECTION, SOLUTION, CONCENTRATE INTRAVENOUS at 23:42

## 2021-04-08 RX ADMIN — ENOXAPARIN SODIUM 70 MG: 80 INJECTION SUBCUTANEOUS at 11:34

## 2021-04-08 RX ADMIN — INSULIN ASPART 1 UNITS: 100 INJECTION, SOLUTION INTRAVENOUS; SUBCUTANEOUS at 23:42

## 2021-04-08 RX ADMIN — I.V. FAT EMULSION 250 ML: 20 EMULSION INTRAVENOUS at 20:19

## 2021-04-08 RX ADMIN — ASCORBIC ACID, VITAMIN A PALMITATE, CHOLECALCIFEROL, THIAMINE HYDROCHLORIDE, RIBOFLAVIN-5 PHOSPHATE SODIUM, PYRIDOXINE HYDROCHLORIDE, NIACINAMIDE, DEXPANTHENOL, ALPHA-TOCOPHEROL ACETATE, VITAMIN K1, FOLIC ACID, BIOTIN, CYANOCOBALAMIN: 200; 3300; 200; 6; 3.6; 6; 40; 15; 10; 150; 600; 60; 5 INJECTION, SOLUTION INTRAVENOUS at 10:18

## 2021-04-08 RX ADMIN — BUSPIRONE HYDROCHLORIDE 30 MG: 15 TABLET ORAL at 08:34

## 2021-04-08 RX ADMIN — ALBUTEROL SULFATE 2.5 MG: 2.5 SOLUTION RESPIRATORY (INHALATION) at 11:13

## 2021-04-08 RX ADMIN — PREDNISONE 40 MG: 20 TABLET ORAL at 20:14

## 2021-04-08 RX ADMIN — INSULIN ASPART 1 UNITS: 100 INJECTION, SOLUTION INTRAVENOUS; SUBCUTANEOUS at 20:19

## 2021-04-08 RX ADMIN — INSULIN ASPART 1 UNITS: 100 INJECTION, SOLUTION INTRAVENOUS; SUBCUTANEOUS at 03:56

## 2021-04-08 RX ADMIN — ALBUTEROL SULFATE 2.5 MG: 2.5 SOLUTION RESPIRATORY (INHALATION) at 07:43

## 2021-04-08 RX ADMIN — OXYMETAZOLINE HYDROCHLORIDE 3 SPRAY: 0.05 SPRAY NASAL at 02:25

## 2021-04-08 RX ADMIN — SULFAMETHOXAZOLE AND TRIMETHOPRIM 320 MG: 80; 16 INJECTION, SOLUTION, CONCENTRATE INTRAVENOUS at 08:32

## 2021-04-08 ASSESSMENT — ACTIVITIES OF DAILY LIVING (ADL)
ADLS_ACUITY_SCORE: 22

## 2021-04-08 ASSESSMENT — MIFFLIN-ST. JEOR: SCORE: 1166.13

## 2021-04-08 NOTE — PROGRESS NOTES
Patient is A/O x 4, vss, slightly tachy, a-febrile, denies pain, JACOBSEN, on 4 L via oxymask, passing gas, up with one assist, GB to bedside commode, tolerating diet well, on calorie count, patient on TPN infusing at 60 Ml, has triple lumen picc on rt upper arm, tele ST, continue to monitor.

## 2021-04-08 NOTE — PROGRESS NOTES
"PULMONOLOGY PROGRESS NOTE    Date of Admission: 3/26/2021    CC/Reason for Hospital visit: Acute hypoxic respiratory failure, suspected PJP  SUBJECTIVE      Events reviewed since last seen. Has been off BiPAP since yesterday. Stable night. No new respiratory problems. States breathing is better today.    ROS: A Problem Pertinent review of systems was negative except for items noted in HPI.  Past Medical, Family, and Social/Substance History has been reviewed: No interval changes.    OBJECTIVE   Vital signs:  Temp: 99.7  F (37.6  C) Temp src: Oral BP: (!) 147/77 Pulse: 106   Resp: 24 SpO2: 92 % O2 Device: High Flow Nasal Cannula (HFNC) Oxygen Delivery: 40 LPM Height: 160 cm (5' 3\") Weight: 68.1 kg (150 lb 2.1 oz)  Estimated body mass index is 26.59 kg/m  as calculated from the following:    Height as of this encounter: 1.6 m (5' 3\").    Weight as of this encounter: 68.1 kg (150 lb 2.1 oz).        I/O last 3 completed shifts:  In: 3073.55 [P.O.:240; I.V.:600]  Out: 1500 [Urine:1300; Emesis/NG output:200]      CONSTITUTIONAL/GENERAL APPEARANCE: Alert female. No Apparent Distress.  PSYCHIATRIC: Pleasant and appropriate mood and affect. Oriented x 3.  EARS, NOSE,THROAT,MOUTH: External ears and nose overall normal. Normal oral mucosa.   NECK: Neck appearance normal. No neck masses and the thyroid is not enlarged.   RESPIRATORY: Non-labored effort. Decreased BS, sl coarse bilaterally.  CARDIOVASCULAR: S1, S2, regular rate and rhythm.    LABORATORY ASSESSMENT    Arterial Blood Gas  Recent Labs   Lab 04/04/21  0800 04/02/21  0615 03/31/21  1440 03/29/21  2235   PH 7.45 7.49* 7.49* 7.51*   PCO2 40 35 34* 35   PO2 74* 83 61* 67*   HCO3 28 26 25 28   O2PER 40% 50% 60 50     CBC  Recent Labs   Lab 04/04/21  0811 04/03/21  0527 04/02/21  0550 04/01/21  0500   WBC 12.7* 12.1* 10.9 10.5   RBC 3.52* 3.41* 3.46* 3.46*   HGB 10.2* 9.8* 9.9* 9.8*   HCT 31.2* 30.2* 30.4* 30.4*   MCV 89 89 88 88   MCH 29.0 28.7 28.6 28.3   MCHC 32.7 32.5 " 32.6 32.2   RDW 14.4 14.6 14.7 14.6    205 206 185     BMP  Recent Labs   Lab 04/04/21  0811 04/04/21  0518 04/03/21  0527 04/02/21  0550   * 131* 130* 131*   POTASSIUM 4.5 4.6 4.7 4.4   CHLORIDE 99 98 98 98   ESTIVEN 8.8 8.5 8.8 8.7   CO2 28 27 28 25   BUN 22 22 26 20   CR 0.51* 0.55 0.51* 0.57   * 183* 163* 181*     INR  Recent Labs   Lab 04/02/21  0550   INR 1.00      BNPNo lab results found in last 7 days.  VENOUS BLOOD GASESNo lab results found in last 7 days.      Additional labs and/or comments:    IMAGING      CXR 4/5 -  IMPRESSION: Mixed interstitial and airspace disease probably similar  to previous given slight differences in technique. PICC line stable.    CXR 4/2 -  IMPRESSION: Right PICC tip at the superior cavoatrial junction. There are persistent patchy infiltrates of both lungs consistent with infectious/inflammatory pneumonitis. No significant pleural effusion. No pneumothorax. Stable cardiac silhouette.    PFT & OTHER TESTING       ASSESSMENT / PLAN      Pulmonary diagnoses:  Abnl CT/CXR R91.8  Asthma unspec J45.909  Hypoxemia R09.02  Resp fail acute J96.00    Additional COVID-19 diagnoses:  Concern of possible exposure to COVID-19, Now RULED OUT Z03.818    ASSESSMENT: 75-year-old female with a history of asthma and obstructive sleep apnea on CPAP, recent diagnosis of glioblastoma status post resection 2/23/21 admitted with progressive dyspnea. Admission CT Chest showed bilateral patchy groundglass opacities, negative for PE.  Initially treated with ceftriaxone and doxy. COVID19 negative 3/26 and 3/29.  Increased oxygen needs 3/29, ultimately requiring BiPAP, with repeat CXR showing increase in bilateral patchy opacities.  Found to have DVT 3/29.  ID consulted 3/29, high suspicion for PJP pneumonia (LDH high, high beta d glucan), unable to confirm with the sputum studies as patient has been unable to provide any sputum.  PJP suspected secondary to prolonged steroid course following  neurosurgery, switched from ceftriaxone to zosyn 3/29 and initiated on high dose Bactrim and increased steroids. Follow-up CXRs 4/2 and 4/5 show persistent patchy infiltrates bilaterally. Patient no longer requiring BiPAP, O2 requirements have decreased. Continue present rx for now.    PLAN:  1. Adjust oxygen, keep SaO2 > 90%  2. BiPAP prn.  3. Bronchodilators - Albuterol nebs  4. Antibiotics - Bactrim.  5. Steroids - Prednisone.  6. Anticoagulation - Lovenox.  7. Increase activity and IS as tolerated.      Holden Jaime M.D.    [facility] [place]  [phone]   (pager)  [phone]   (office)

## 2021-04-08 NOTE — PROGRESS NOTES
"Hospitalist Cross Cover  4/8/2021    Notified of nosebleed that is not responding to direct pressure. Chart reviewed. Afrin currently on hold but looks like <3 days of use on MAR so could still try it tonight. Patient is on bipap so not a good candidate for rhino rocket at this time. She remains on therapeutic anticoagulation.    BP (!) 144/87   Pulse 92   Temp 98.2  F (36.8  C) (Oral)   Resp 23   Ht 1.6 m (5' 3\")   Wt 70.4 kg (155 lb 3.3 oz)   SpO2 98%   BMI 27.49 kg/m      Plan: Resume afrin prn for now.  Consider ENT consultation.  Discussed with bedside nurse.    Tracee Newman MD        "

## 2021-04-08 NOTE — PROGRESS NOTES
[facility]    Medicine Progress Note - Hospitalist Service       Date of Admission:  3/26/2021  Assessment & Plan           Olga Bailey is a 75 year old female admitted on 3/26/2021.  Past medical history that is most notable for recent craniotomy and resection of glioblastoma multiforme, as well as uncomplicated asthma, who presents with dyspnea and is found to have acute bilateral pneumonia.       Acute hypoxic respiratory failure due to bilateral pneumonia, suspected PJP  * Presents with progressive dyspnea.  Afebrile without leukocytosis but left shift and lymphopenia noted on differential.  Admission CT showing bilateral patchy groundglass opacities, negative for PE.  Initially treated with ceftriaxone and doxy.   * COVID19 negative 3/26 and 3/29.    * Procal low 3/28 and 3/29  * Increased oxygen needs 3/29 (ultimately requiring Bipap) with repeat CXR showing increase in bilateral patchy opacities  * Repeat COVID, procal, trop, BNP negative, resp viral panel negative, aspergillus studies, EKG unremarkable on 3/29  * TTE 3/29 with EF 50-55% without WMA, mildly decreased RV systolic function with mild dilation.  * Found to have DVT 3/29 but with normal hemodynamics, normal trop and BNP, no significant RV strain on echo and risk for renal injury with contrast and initiation of high dose Bactrim, did not pursue CT imaging given very low concern for saddle embolus with need for thrombectomy.  * ID consulted 3/29, suspect PJP with prolonged steroid course following neurosurgery (see below).  * Switched from ceftriaxone to zosyn 3/29 and initiated on high dose Bactrim and increased steroids  * Completed 5-day course of doxycycline 4/1 and 7-day course of zosyn 4/4  * 1, 3 beta glucan and fungitell or positive and likely indicative of PJP    - currently on high dose Bactrim (dose 10/21) and prednisone 40 mg bid  - albuterol q4h while awake  - weaned to facemask overnight and tolerating  very well  - so far unable to produce any sputum for confirmatory culture  - will require repeat CT in early May to reassess infiltrates per Pulm  - Pulm and ID recs much appreciated    Leukocytosis  Persistent leukocytosis since 4/3, suspect steroids vs reactive.  Afebrile with multiple procalcitonin studies negative without new signs/symptoms of infection.      Bilateral LE DVT  US 3/29 showing occlusive DVT of bilateral posterior tibial veins and soleal veins to mid calf.  - continue on therapeutic lovenox     Epistaxis  Has experienced mild intermittent epistaxis while on therapeutic anticoagulation.  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    - slight LE edema today; monitor for now   - Na remains stable 4/8, monitor daily while receiving fluids    Steroid induced hyperglycemia  - continue high dose ssi; insulin in TPN per PharmD     Urinary incontinence  UA negative 4/2  - continue pure wick      Hypokalemia, resolved     GBM s/p resection 2/23/21  Followed by Dr. Baker of Neuro-Oncology.  She is in process of being set up to start chemotherapy and XRT next week.   Appears to have been on prolonged steroids following neurosurgery as was discharged without plan for taper and initiated taper only recently in follow up with Dr. Baker.  - now on prednisone as above with plan for prolonged taper  - has not yet initiated chemo (temozolomide) and planned radiation     Hypertension  - continue PTA amlodipine     Asthma  - albuterol as above     Depression and anxiety  Insomnia  - continue PTA Prozac and Buspar  - continue PTA Atarax for sleep  - prn  lorazepam     GERD  - continue PTA Pepcid     Chronic anemia  Baseline hgb 10-11 g/dL, gradually trended slightly down this admission likely due to marrow suppression in acute illness.  - hgb stable 9-10 g/dL on 4/8     Non-severe malnutrition  Noted decrease oral intake and drop in albumin, subcutaneous fat loss on exam.   - PICC line in place  - continue TPN, hopefully wean in next couple days  - staff reporting taking 50-75% of meals; start calorie counts          Diet: Snacks/Supplements Adult: Boost Shake; Between Meals  Regular Diet Adult  parenteral nutrition - Clinimix E  Snacks/Supplements Adult: Magic Cup; With Meals  Room Service  Calorie Counts    DVT Prophylaxis: therapeutic lovenox  Martino Catheter: not present  Code Status: Full Code           Disposition Plan   Expected discharge: 4 - 7 days, recommended to transitional care unit once O2 use less than 3 liters/minute.  Entered: Doug Almaguer MD 04/08/2021, 8:10 AM       The patient's care was discussed with the Bedside Nurse, Patient and Patient's Family.    Doug Almaguer MD  Hospitalist Service  [facility]  Contact information available via [facility] Paging/Directory    ______________________________________________________________________    Interval History   Reports having a terrible night due to epistaxis, now resolved.  Denies any trouble with airway management.  No change in dyspnea or chest pressure today.  Feels a little chilled but attributes this to being fatigued.  No other complaints.     Data reviewed today: I reviewed all medications, new labs and imaging results over the last 24 hours. I personally reviewed no images or EKG's today.    Physical Exam   Vital Signs: Temp: 98.7  F (37.1  C) Temp src: Oral BP: (!) 157/88 Pulse: 97   Resp: 17 SpO2: 98 % O2 Device: Oxymask Oxygen Delivery: 4 LPM  Weight: 154 lbs 12.21 oz     General Appearance: Thin female in NAD  Respiratory: bibasilar crackles, no wheezing, no  tachypnea on facemask  Cardiovascular: RRR, normal s1/s2 with grade 1/6 systolic murmur, trace peripheral edema  GI: abdomen soft, nontender, normal bowel sounds  Other: Alert and appropriate, cranial nerves grossly intact     Data   Recent Labs   Lab 04/08/21  0628 04/07/21  0555 04/06/21  0600 04/05/21  0640 04/02/21  0550 04/02/21  0550   WBC 15.7* 15.5* 16.1* 15.4*   < > 10.9   HGB 8.9* 9.9* 8.8* 9.1*   < > 9.9*   MCV 88 89 88 88   < > 88    228 223 214   < > 206   INR  --   --   --  1.02  --  1.00   * 128* 132* 130*   < > 131*   POTASSIUM 4.5 5.2 4.7 4.7   < > 4.4   CHLORIDE 99 95 99 98   < > 98   CO2 28 27 27 26   < > 25   BUN 22 22 23 25   < > 20   CR 0.56 0.53 0.50* 0.50*   < > 0.57   ANIONGAP 5 6 6 6   < > 8   ESTIVEN 9.0 8.6 8.9 8.7   < > 8.7   * 207* 132* 165*   < > 181*   ALBUMIN  --   --   --  2.3*  --  2.4*   PROTTOTAL  --   --   --  5.8*  --  5.9*   BILITOTAL  --   --   --  0.3  --  0.3   ALKPHOS  --   --   --  90  --  78   ALT  --   --   --  28  --  27   AST  --   --   --  28  --  23    < > = values in this interval not displayed.

## 2021-04-08 NOTE — PLAN OF CARE
IMC Status. A/Ox4. VSS ex HTN, tachypnea and tachycardic on high flow, weaning as tolerated. JACOBSEN. LS: dimished. BS: active, BMx3.Tele: NSR. Up Ax1, GB/walker. TPN @ 60 mL/hr. PICC x3 - WNL. Incontinent of urine. Utilizing pure wick overnight and bedside commode during the day. B,133, & 140. Tolerating a regular diet, calorie counting. Bruised skin integrity. Urine sample done. Need to collect a sputum sample when possible. Plan pending clinical improvement. Will continue to monitor.

## 2021-04-08 NOTE — PROGRESS NOTES
[facility]    Infectious Disease Progress Note    Date of Service (when I saw the patient): 04/08/2021     Assessment & Plan   Olga Bailey is a 75 year old female who was admitted on 3/26/2021.     Impression:  1. 75 y.o with recent diagnosis of craniotomy and resection of glioblastoma multiforme.   2. Has been on very high dose steroid taper for the past month since the craniotomy. Not on any bactrim prophylaxis before admission.    3. Admitted with shortness of breath.   4. Found to have bilateral ground glass infiltrates on the imaging, very hypoxic.   5. COVID PCR negative x 2.   6. No leucocytosis, procal not elevated.      Recommendations:   High suspicion for PJP pneumonia. LDH high, high beta d glucan unable to confirm with the sputum studies as patient has been unable to provide any sputum   DC zosyn )  And on treatment dose for PJP as we wait on sputum cultures.   Steroids for PJP, taper as able      Day 10/21 septra slow improvement to be expected   Obviously with not proven infection conceivably second org, or not PJP but no sign of either, so continued close monitor      Donny Sanabria MD    Interval History   On  high flow comfortable  Nosebleed now stopped  No fever   No new micro   No new complaints       Physical Exam   Temp: 98.7  F (37.1  C) Temp src: Oral BP: (!) 147/83 Pulse: 96   Resp: 24 SpO2: 98 % O2 Device: Oxymask Oxygen Delivery: 4 LPM  Vitals:    04/06/21 0600 04/07/21 0656 04/08/21 0400   Weight: 71 kg (156 lb 8.4 oz) 70.4 kg (155 lb 3.3 oz) 70.2 kg (154 lb 12.2 oz)     Vital Signs with Ranges  Temp:  [97.9  F (36.6  C)-98.9  F (37.2  C)] 98.7  F (37.1  C)  Pulse:  [] 96  Resp:  [9-46] 24  BP: (127-157)/(76-91) 147/83  FiO2 (%):  [50 %-70 %] 50 %  SpO2:  [81 %-99 %] 98 %    Constitutional: Awake, alert  Lungs: diminished bilateral   Cardiovascular: Regular rate and rhythm, normal S1 and S2, and no murmur noted  Abdomen: Normal bowel sounds, soft,  non-distended, non-tender  Skin: No rashes, no cyanosis, no edema  Other:    Medications     dextrose       dextrose 250 mL (04/07/21 0643)     - MEDICATION INSTRUCTIONS -       parenteral nutrition - Clinimix E 60 mL/hr at 04/08/21 1018       albuterol  2.5 mg Nebulization Q4H While awake     amLODIPine  10 mg Oral Daily     busPIRone HCl  30 mg Oral BID     enoxaparin ANTICOAGULANT  70 mg Subcutaneous Q12H     famotidine  20 mg Oral BID     FLUoxetine  80 mg Oral Daily     insulin aspart  1-12 Units Subcutaneous Q4H     lipids  250 mL Intravenous Q24H     predniSONE  40 mg Oral BID w/meals     sodium chloride (PF)  3 mL Intracatheter Q8H     sulfamethoxazole-trimethoprim  320 mg Intravenous Q8H       Data   All microbiology laboratory data reviewed.  Recent Labs   Lab Test 04/08/21 0628 04/07/21  0555 04/06/21  0600   WBC 15.7* 15.5* 16.1*   HGB 8.9* 9.9* 8.8*   HCT 27.7* 26.5* 27.5*   MCV 88 89 88    228 223     Recent Labs   Lab Test 04/08/21 0628 04/07/21  0555 04/06/21  0600   CR 0.56 0.53 0.50*     No lab results found.  Recent Labs   Lab Test 03/29/21 1951 03/29/21 1946   CULT No growth No growth       Attestation:  Total time on the floor involved in the patient's care: 35 minutes. Total time spent in counseling/care coordination: >50%

## 2021-04-08 NOTE — PLAN OF CARE
Pt in sinus rhythm to sinus tachycardia at times. BP has become elevated but continues to be under the ordered parameters. BiPap switched to Oxymask at 4L due to epistaxis. Lung sounds diminished over all fields. Pt tachypneic times. Good urine output through the Purewick. Sliding scale insulin given as ordered. TPN and lipids infused overnight. Continues on IV antibiotics.

## 2021-04-08 NOTE — PROVIDER NOTIFICATION
Paged Dr Almaguer of pt having 2 episodes of epistaxis with bipap overnight. Pt stating well on oxymask at 4L. Pt wondering if anything else can be done for the epistaxis?    [id number] Dr Almaguer up to the floor to see pt

## 2021-04-08 NOTE — PROGRESS NOTES
CALORIE COUNT      Approximate Oral Intake for:  4/7     Calories: 1315   Protein: 54     TPN D15 AA5 to goal 60 mL/hr + Lipids 250 mL daily = 1522 kcals (27 kcal/kg), 72 gm pro (1.3 gm/kg), 216 gm CHO, 33% fat     Total (Oral + TPN): 2837 kcals (50 kcal/kg, 167% calories needs) and 126 g protein (2.2 g/kg, 150% needs)    Estimated Needs:    Energy Needs:  5285-2364 kcals (25-30 Kcal/Kg) - overweight   Protein Needs:  67-84 grams protein (1.2-1.5 g pro/Kg) - hypercatabolism with acute illness     Summary:   Above included 3 meals and 1 supplement. Since yesterday met 94% energy needs and 80% protein needs via oral intake alone, recommended discontinuing TPN.     Will continue calorie count     Klaudia Drew  Dietetic Intern

## 2021-04-08 NOTE — PROGRESS NOTES
Care Management Follow Up    Length of Stay (days): 13    Expected Discharge Date: 04/12/21     Concerns to be Addressed: discharge planning  Pt states she is thinking about staying with her daughter at discharge. SW explained the potential for a TCU placement. Pt reports if it is something she needs to do, she will do it.  Patient plan of care discussed at interdisciplinary rounds: Yes    Anticipated Discharge Disposition: Home, Home Care, Transitional Care  Disposition Comments: Discuss the potential discharge planning for TCU or home with daughter  Anticipated Discharge Services: Other (see comment)(Therapy)  Anticipated Discharge DME: None    Patient/family educated on Medicare website which has current facility and service quality ratings:    Education Provided on the Discharge Plan:    Patient/Family in Agreement with the Plan:      Referrals Placed by CM/SW: Internal Clinic Care Coordination, Post Acute Facilities, Transportation, Communication hand-offs to next level of Care Providers  Private pay costs discussed: Not applicable    Additional Information:  PT came to CC and states based on today's session and conversation with daughter they would like ARU (Pt had been at  ARU previously).  No OT recommendations.  MD paged to order OT  Farhan ennis and will follow patient.  CC/SW to follow for discharge .       Patrizia Gallego, RN

## 2021-04-08 NOTE — PLAN OF CARE
IMC Status. A&Ox4. VSS ex tachycardic, on 4L O2 oxymask. LS diminished. BS active, passing flatus. Up Ax1, GB/walker, pivot to chair/commode. TPN @ 60 mL/hr. PICC in right. Voiding. Tolerating a regular diet, calorie counting. Need to collect a sputum sample when possible.

## 2021-04-09 ENCOUNTER — APPOINTMENT (OUTPATIENT)
Dept: SPEECH THERAPY | Facility: CLINIC | Age: 76
DRG: 166 | End: 2021-04-09
Attending: HOSPITALIST
Payer: MEDICARE

## 2021-04-09 ENCOUNTER — APPOINTMENT (OUTPATIENT)
Dept: PHYSICAL THERAPY | Facility: CLINIC | Age: 76
DRG: 166 | End: 2021-04-09
Attending: NURSE PRACTITIONER
Payer: MEDICARE

## 2021-04-09 ENCOUNTER — TELEPHONE (OUTPATIENT)
Dept: ONCOLOGY | Facility: CLINIC | Age: 76
End: 2021-04-09

## 2021-04-09 ENCOUNTER — APPOINTMENT (OUTPATIENT)
Dept: OCCUPATIONAL THERAPY | Facility: CLINIC | Age: 76
DRG: 166 | End: 2021-04-09
Attending: HOSPITALIST
Payer: MEDICARE

## 2021-04-09 LAB
ANION GAP SERPL CALCULATED.3IONS-SCNC: 4 MMOL/L (ref 3–14)
BUN SERPL-MCNC: 27 MG/DL (ref 7–30)
CALCIUM SERPL-MCNC: 8.7 MG/DL (ref 8.5–10.1)
CHLORIDE SERPL-SCNC: 100 MMOL/L (ref 94–109)
CO2 SERPL-SCNC: 28 MMOL/L (ref 20–32)
CREAT SERPL-MCNC: 0.54 MG/DL (ref 0.52–1.04)
GFR SERPL CREATININE-BSD FRML MDRD: >90 ML/MIN/{1.73_M2}
GLUCOSE BLDC GLUCOMTR-MCNC: 113 MG/DL (ref 70–99)
GLUCOSE BLDC GLUCOMTR-MCNC: 136 MG/DL (ref 70–99)
GLUCOSE BLDC GLUCOMTR-MCNC: 139 MG/DL (ref 70–99)
GLUCOSE BLDC GLUCOMTR-MCNC: 144 MG/DL (ref 70–99)
GLUCOSE BLDC GLUCOMTR-MCNC: 239 MG/DL (ref 70–99)
GLUCOSE SERPL-MCNC: 131 MG/DL (ref 70–99)
MAGNESIUM SERPL-MCNC: 2.3 MG/DL (ref 1.6–2.3)
PHOSPHATE SERPL-MCNC: 3.1 MG/DL (ref 2.5–4.5)
POTASSIUM SERPL-SCNC: 4.5 MMOL/L (ref 3.4–5.3)
SODIUM SERPL-SCNC: 132 MMOL/L (ref 133–144)

## 2021-04-09 PROCEDURE — 94799 UNLISTED PULMONARY SVC/PX: CPT

## 2021-04-09 PROCEDURE — 97110 THERAPEUTIC EXERCISES: CPT | Mod: GO

## 2021-04-09 PROCEDURE — 999N000190 HC STATISTIC VAT ROUNDS

## 2021-04-09 PROCEDURE — 97166 OT EVAL MOD COMPLEX 45 MIN: CPT | Mod: GO

## 2021-04-09 PROCEDURE — 94640 AIRWAY INHALATION TREATMENT: CPT | Mod: 76

## 2021-04-09 PROCEDURE — 250N000011 HC RX IP 250 OP 636: Performed by: HOSPITALIST

## 2021-04-09 PROCEDURE — 999N000157 HC STATISTIC RCP TIME EA 10 MIN

## 2021-04-09 PROCEDURE — 250N000013 HC RX MED GY IP 250 OP 250 PS 637: Performed by: HOSPITALIST

## 2021-04-09 PROCEDURE — 92523 SPEECH SOUND LANG COMPREHEN: CPT | Mod: GN | Performed by: SPEECH-LANGUAGE PATHOLOGIST

## 2021-04-09 PROCEDURE — 120N000001 HC R&B MED SURG/OB

## 2021-04-09 PROCEDURE — 999N001017 HC STATISTIC GLUCOSE BY METER IP

## 2021-04-09 PROCEDURE — 250N000011 HC RX IP 250 OP 636: Performed by: INTERNAL MEDICINE

## 2021-04-09 PROCEDURE — 250N000013 HC RX MED GY IP 250 OP 250 PS 637: Performed by: INTERNAL MEDICINE

## 2021-04-09 PROCEDURE — 84100 ASSAY OF PHOSPHORUS: CPT | Performed by: INTERNAL MEDICINE

## 2021-04-09 PROCEDURE — 92507 TX SP LANG VOICE COMM INDIV: CPT | Mod: GN | Performed by: SPEECH-LANGUAGE PATHOLOGIST

## 2021-04-09 PROCEDURE — 250N000012 HC RX MED GY IP 250 OP 636 PS 637: Performed by: HOSPITALIST

## 2021-04-09 PROCEDURE — 258N000003 HC RX IP 258 OP 636: Performed by: HOSPITALIST

## 2021-04-09 PROCEDURE — 99233 SBSQ HOSP IP/OBS HIGH 50: CPT | Performed by: HOSPITALIST

## 2021-04-09 PROCEDURE — 250N000009 HC RX 250: Performed by: HOSPITALIST

## 2021-04-09 PROCEDURE — 120N000013 HC R&B IMCU

## 2021-04-09 PROCEDURE — 94640 AIRWAY INHALATION TREATMENT: CPT

## 2021-04-09 PROCEDURE — 83735 ASSAY OF MAGNESIUM: CPT | Performed by: INTERNAL MEDICINE

## 2021-04-09 PROCEDURE — 97110 THERAPEUTIC EXERCISES: CPT | Mod: GP | Performed by: PHYSICAL THERAPIST

## 2021-04-09 PROCEDURE — 97535 SELF CARE MNGMENT TRAINING: CPT | Mod: GO

## 2021-04-09 PROCEDURE — 80048 BASIC METABOLIC PNL TOTAL CA: CPT | Performed by: INTERNAL MEDICINE

## 2021-04-09 RX ORDER — METOPROLOL TARTRATE 25 MG/1
12.5 TABLET, FILM COATED ORAL 2 TIMES DAILY
DISCHARGE
Start: 2021-04-09 | End: 2021-04-26

## 2021-04-09 RX ORDER — LEVALBUTEROL 1.25 MG/.5ML
1.25 SOLUTION, CONCENTRATE RESPIRATORY (INHALATION)
Status: ON HOLD | DISCHARGE
Start: 2021-04-09 | End: 2021-04-26

## 2021-04-09 RX ORDER — ALBUTEROL SULFATE 90 UG/1
2 AEROSOL, METERED RESPIRATORY (INHALATION) EVERY 4 HOURS PRN
DISCHARGE
Start: 2021-04-09 | End: 2021-12-03

## 2021-04-09 RX ORDER — LORAZEPAM 0.5 MG/1
0.5 TABLET ORAL 2 TIMES DAILY PRN
Status: DISCONTINUED | OUTPATIENT
Start: 2021-04-09 | End: 2021-04-26 | Stop reason: HOSPADM

## 2021-04-09 RX ORDER — LEVALBUTEROL 1.25 MG/.5ML
1.25 SOLUTION, CONCENTRATE RESPIRATORY (INHALATION)
Status: DISCONTINUED | OUTPATIENT
Start: 2021-04-09 | End: 2021-04-16

## 2021-04-09 RX ORDER — LORAZEPAM 0.5 MG/1
0.5 TABLET ORAL 2 TIMES DAILY PRN
Qty: 10 TABLET | Refills: 0 | Status: SHIPPED | OUTPATIENT
Start: 2021-04-09 | End: 2021-04-19

## 2021-04-09 RX ADMIN — AMLODIPINE BESYLATE 10 MG: 10 TABLET ORAL at 08:07

## 2021-04-09 RX ADMIN — PREDNISONE 40 MG: 20 TABLET ORAL at 08:33

## 2021-04-09 RX ADMIN — HYDROXYZINE HYDROCHLORIDE 50 MG: 25 TABLET, FILM COATED ORAL at 21:44

## 2021-04-09 RX ADMIN — BUSPIRONE HYDROCHLORIDE 30 MG: 15 TABLET ORAL at 21:40

## 2021-04-09 RX ADMIN — LORAZEPAM 0.5 MG: 2 INJECTION INTRAMUSCULAR; INTRAVENOUS at 01:27

## 2021-04-09 RX ADMIN — FAMOTIDINE 20 MG: 20 TABLET ORAL at 08:07

## 2021-04-09 RX ADMIN — SULFAMETHOXAZOLE AND TRIMETHOPRIM 320 MG: 80; 16 INJECTION, SOLUTION, CONCENTRATE INTRAVENOUS at 08:21

## 2021-04-09 RX ADMIN — PREDNISONE 40 MG: 20 TABLET ORAL at 17:08

## 2021-04-09 RX ADMIN — LEVALBUTEROL HYDROCHLORIDE 1.25 MG: 1.25 SOLUTION, CONCENTRATE RESPIRATORY (INHALATION) at 11:15

## 2021-04-09 RX ADMIN — SULFAMETHOXAZOLE AND TRIMETHOPRIM 320 MG: 80; 16 INJECTION, SOLUTION, CONCENTRATE INTRAVENOUS at 15:50

## 2021-04-09 RX ADMIN — ALBUTEROL SULFATE 2.5 MG: 2.5 SOLUTION RESPIRATORY (INHALATION) at 07:21

## 2021-04-09 RX ADMIN — ENOXAPARIN SODIUM 70 MG: 80 INJECTION SUBCUTANEOUS at 21:40

## 2021-04-09 RX ADMIN — FLUOXETINE 80 MG: 20 CAPSULE ORAL at 08:08

## 2021-04-09 RX ADMIN — FAMOTIDINE 20 MG: 20 TABLET ORAL at 21:40

## 2021-04-09 RX ADMIN — Medication 12.5 MG: at 21:37

## 2021-04-09 RX ADMIN — LEVALBUTEROL HYDROCHLORIDE 1.25 MG: 1.25 SOLUTION, CONCENTRATE RESPIRATORY (INHALATION) at 15:00

## 2021-04-09 RX ADMIN — ENOXAPARIN SODIUM 70 MG: 80 INJECTION SUBCUTANEOUS at 10:26

## 2021-04-09 RX ADMIN — LEVALBUTEROL HYDROCHLORIDE 1.25 MG: 1.25 SOLUTION, CONCENTRATE RESPIRATORY (INHALATION) at 19:13

## 2021-04-09 RX ADMIN — BUSPIRONE HYDROCHLORIDE 30 MG: 15 TABLET ORAL at 08:08

## 2021-04-09 ASSESSMENT — ACTIVITIES OF DAILY LIVING (ADL)
ADLS_ACUITY_SCORE: 18
ADLS_ACUITY_SCORE: 18
PREVIOUS_RESPONSIBILITIES: MEAL PREP;HOUSEKEEPING;LAUNDRY;SHOPPING;YARDWORK;MEDICATION MANAGEMENT;FINANCES;DRIVING;WORK
ADLS_ACUITY_SCORE: 18

## 2021-04-09 ASSESSMENT — MIFFLIN-ST. JEOR: SCORE: 1119.13

## 2021-04-09 NOTE — PLAN OF CARE
IMC. A&Ox4. VSS on 2L Oxymask. Tele: ST/SR. Denies pian. Ativan given for anxiety. LS diminished. TPN/lipids infusing into PICC. Incontinent, purewick in place. Ecchymotic skin. Tolerating regular diet. Up with 1 GB/W. IV abx and steroids. Plan to discharge to ARU when respiratory status improves. SW following.

## 2021-04-09 NOTE — PROGRESS NOTES
Care Management Follow Up    Length of Stay (days): 14    Expected Discharge Date: 04/10/21(ARU)     Concerns to be Addressed: discharge planning  Pt states she is thinking about staying with her daughter at discharge. CAMRNO explained the potential for a TCU placement. Pt reports if it is something she needs to do, she will do it.  Patient plan of care discussed at interdisciplinary rounds: Yes    Anticipated Discharge Disposition: Home, Home Care, Transitional Care  Disposition Comments: Discuss the potential discharge planning for TCU or home with daughter  Anticipated Discharge Services: Other (see comment)(Therapy)  Anticipated Discharge DME: None    Patient/family educated on Medicare website which has current facility and service quality ratings:    Education Provided on the Discharge Plan:    Patient/Family in Agreement with the Plan:      Referrals Placed by CM/CAMRON: Internal Clinic Care Coordination, Post Acute Facilities, Transportation, Communication hand-offs to next level of Care Providers  Private pay costs discussed: Not applicable    Additional Information:  CAMRON spoke with Farhan, [facility] ARU liaison, regarding recommendation and referral. Farhan reports he reviewed pt's chart and does not feel pt has an appropriate diagnosis. Farhan reports bilateral pneumonia is not a qualifying diagnosis for ARU. Farhan also had concerns about pt's chemo and radiation plan. It is mentioned it the plan would start ext week. Pt could not receive chemo while at ARU. Farhan recommends pt attends a TCU at discharge and is currently declining pt. CAMRON placed call to pt's daughter regarding discharge planning. Pt's daughter, La Nena, reports she is not thrilled about TCU placement, but would potentially consider it. La Nena reports she is concerned about pt's chemo/radiation plan because she would not like to delay it any longer than need be.  CAMRON explained how at TCU pt cannot be on chemo/radiation. La Nena reports pt's oncologist  mentioned there are TCUs that do take pts on chemo/radiation. CAMRON stated they were unfamiliar with any TCUs that take pts on chemo and radiation. La Nena took down SW's contact information, so the oncologist could give this list what facilities they know of. SW to follow up with pt to see what they would like to do regarding discharge planning.     Call from pt's oncology clinic inquiring if SW would survey TCUs for who can take pt's on chemo/radition. CAMRON stated the TCUs they work with do not accept pts who are on chemo and radiation. CAMRON requested Dr. Baker, pt's oncologist, follow up with SW regarding the TCUs who take pt's on chemo/radiation.     Call from Dr. Baker's office and was informed if the TCU is hospital based then they can allow pt to have chemo. The staff offered the [facility] or up in [place]. CAMRON sent a referral to  TCU and updated La Nena. La Nena would like to know if pt is accepted to  TCU. La Nena would also like to know their visiting policies. La Nena is working tomorrow (she is a PACU nurse at [employer]) and states she can be reached downstairs if anything arises.      DAYNA Caban

## 2021-04-09 NOTE — TELEPHONE ENCOUNTER
Dr. Baker was stated she was not aware of specific TCU facilities.     Per CAMRON Basurto, hospital-based clinics are typically allowed for cancer treatment during TCU admission.  The only hospital-based radiation clinics are [facility] and [facility].    Per oral chemotherapy financial Amelie, the patient may be able to receive her Temodar if it is filled through [facility] and delivered to the patient or a family member.  Then the patient can take her own supply of medication.    Called CAMRON Tayla to update her.  She states she plans to go speak to the patient about discharge plans.    Called daughter La Nena to update her.  Left VM that hospital CAMRON was updated and is working on the discharge planning.    Routing to SHANIKA Gil for follow-up next week.    Erich Hodges, JIANN, RN, OCN  Oncology Care Coordinator  [facility]

## 2021-04-09 NOTE — PROGRESS NOTES
"PULMONOLOGY PROGRESS NOTE    Date of Admission: 3/26/2021    CC/Reason for Hospital visit: Acute hypoxic respiratory failure, suspected PJP  SUBJECTIVE      Events reviewed since last seen. Remains off BiPAP. Stable night. No new respiratory problems. States breathing is about the same today. No further epistaxis.    ROS: A Problem Pertinent review of systems was negative except for items noted in HPI.  Past Medical, Family, and Social/Substance History has been reviewed: No interval changes.    OBJECTIVE   Vital signs:  Temp: 99.7  F (37.6  C) Temp src: Oral BP: (!) 147/77 Pulse: 106   Resp: 24 SpO2: 92 % O2 Device: High Flow Nasal Cannula (HFNC) Oxygen Delivery: 40 LPM Height: 160 cm (5' 3\") Weight: 68.1 kg (150 lb 2.1 oz)  Estimated body mass index is 26.59 kg/m  as calculated from the following:    Height as of this encounter: 1.6 m (5' 3\").    Weight as of this encounter: 68.1 kg (150 lb 2.1 oz).        I/O last 3 completed shifts:  In: 3073.55 [P.O.:240; I.V.:600]  Out: 1500 [Urine:1300; Emesis/NG output:200]      CONSTITUTIONAL/GENERAL APPEARANCE: Alert female. No Apparent Distress.  PSYCHIATRIC: Pleasant and appropriate mood and affect. Oriented x 3.  EARS, NOSE,THROAT,MOUTH: External ears and nose overall normal. Normal oral mucosa.   NECK: Neck appearance normal. No neck masses and the thyroid is not enlarged.   RESPIRATORY: Non-labored effort. Decreased BS anteriorly.  CARDIOVASCULAR: S1, S2, regular rate and rhythm.    LABORATORY ASSESSMENT    Arterial Blood Gas  Recent Labs   Lab 04/04/21  0800 04/02/21  0615 03/31/21  1440 03/29/21  2235   PH 7.45 7.49* 7.49* 7.51*   PCO2 40 35 34* 35   PO2 74* 83 61* 67*   HCO3 28 26 25 28   O2PER 40% 50% 60 50     CBC  Recent Labs   Lab 04/04/21  0811 04/03/21  0527 04/02/21  0550 04/01/21  0500   WBC 12.7* 12.1* 10.9 10.5   RBC 3.52* 3.41* 3.46* 3.46*   HGB 10.2* 9.8* 9.9* 9.8*   HCT 31.2* 30.2* 30.4* 30.4*   MCV 89 89 88 88   MCH 29.0 28.7 28.6 28.3   MCHC 32.7 " 32.5 32.6 32.2   RDW 14.4 14.6 14.7 14.6    205 206 185     BMP  Recent Labs   Lab 04/04/21  0811 04/04/21  0518 04/03/21  0527 04/02/21  0550   * 131* 130* 131*   POTASSIUM 4.5 4.6 4.7 4.4   CHLORIDE 99 98 98 98   ESTIVEN 8.8 8.5 8.8 8.7   CO2 28 27 28 25   BUN 22 22 26 20   CR 0.51* 0.55 0.51* 0.57   * 183* 163* 181*     INR  Recent Labs   Lab 04/02/21  0550   INR 1.00      BNPNo lab results found in last 7 days.  VENOUS BLOOD GASESNo lab results found in last 7 days.      Additional labs and/or comments:    IMAGING      CXR 4/5 -  IMPRESSION: Mixed interstitial and airspace disease probably similar  to previous given slight differences in technique. PICC line stable.    CXR 4/2 -  IMPRESSION: Right PICC tip at the superior cavoatrial junction. There are persistent patchy infiltrates of both lungs consistent with infectious/inflammatory pneumonitis. No significant pleural effusion. No pneumothorax. Stable cardiac silhouette.    PFT & OTHER TESTING       ASSESSMENT / PLAN      Pulmonary diagnoses:  Abnl CT/CXR R91.8  Asthma unspec J45.909  Hypoxemia R09.02  Resp fail acute J96.00    Additional COVID-19 diagnoses:  Concern of possible exposure to COVID-19, Now RULED OUT Z03.818    ASSESSMENT: 75-year-old female with a history of asthma and obstructive sleep apnea on CPAP, recent diagnosis of glioblastoma status post resection 2/23/21 admitted with progressive dyspnea. Admission CT Chest showed bilateral patchy groundglass opacities, negative for PE.  Initially treated with ceftriaxone and doxy. COVID19 negative 3/26 and 3/29.  Increased oxygen needs 3/29, ultimately requiring BiPAP, with repeat CXR showing increase in bilateral patchy opacities.  Found to have DVT 3/29.  ID consulted 3/29, high suspicion for PJP pneumonia (LDH high, high beta d glucan), unable to confirm with the sputum studies as patient has been unable to provide any sputum.  PJP suspected secondary to prolonged steroid course  following neurosurgery, switched from ceftriaxone to zosyn 3/29 and initiated on high dose Bactrim and increased steroids.  Follow-up CXRs 4/2 and 4/5 show persistent patchy infiltrates bilaterally. Patient no longer requiring BiPAP, O2 requirements continue to slowly decrease. Continue present rx for now.    PLAN:  1. Wean down O2, keep SaO2 > 90%  2. Bronchodilators - Xopenex nebs.  3. Antibiotics - Bactrim.  4. Steroids - Prednisone.  5. Anticoagulation - Lovenox.  6. Increase activity and IS as tolerated.    No further suggestions at this time. Dr. Ortiz will see pt in follow-up next week. Please call if needed over the WE.      Holden Jaime M.D.    [facility]  [phone]   (pager)  [phone]   (office)

## 2021-04-09 NOTE — PROGRESS NOTES
[facility]    Medicine Progress Note - Hospitalist Service       Date of Admission:  3/26/2021  Assessment & Plan           Olga Bailey is a 75 year old female admitted on 3/26/2021.  Past medical history that is most notable for recent craniotomy and resection of glioblastoma multiforme, as well as uncomplicated asthma, who presents with dyspnea and is found to have acute bilateral pneumonia.       Acute hypoxic respiratory failure due to bilateral pneumonia, suspected PJP  * Presents with progressive dyspnea.  Afebrile without leukocytosis but left shift and lymphopenia noted on differential.  Admission CT showing bilateral patchy groundglass opacities, negative for PE.  Initially treated with ceftriaxone and doxy.   * COVID19 negative 3/26 and 3/29.  * Procal low 3/28 and 3/29  * Increased oxygen needs 3/29 (ultimately requiring Bipap) with repeat CXR showing increase in bilateral patchy opacities  * Repeat COVID, procal, trop, BNP negative, resp viral panel negative, aspergillus studies, EKG unremarkable on 3/29  * TTE 3/29 with EF 50-55% without WMA, mildly decreased RV systolic function with mild dilation.  * Found to have DVT 3/29 but with normal hemodynamics, normal trop and BNP, no significant RV strain on echo and risk for renal injury with contrast and initiation of high dose Bactrim, did not pursue CT imaging given very low concern for saddle embolus with need for thrombectomy.  * ID consulted 3/29, suspect PJP with prolonged steroid course following neurosurgery (see below).  * Switched from ceftriaxone to zosyn 3/29 and initiated on high dose Bactrim and increased steroids  * Completed 5-day course of doxycycline 4/1 and 7-day course of zosyn 4/4  * 1, 3 beta glucan and fungitell or positive and likely indicative of PJP    - currently on high dose Bactrim (dose 11/21) and prednisone 40 mg bid  - switch albuterol to levalbuterol q4h while awake due to tachycardia  4/9  - stable off Bipap and HFNC >24 hours, will discontinue IMC status  - so far unable to produce any sputum for confirmatory culture  - will require repeat CT in early May to reassess infiltrates per Pulm  - Pulm and ID recs much appreciated    Leukocytosis  Persistent leukocytosis since 4/3, suspect steroids vs reactive.  Afebrile with multiple procalcitonin studies negative without new signs/symptoms of infection.   - remains stable     Bilateral LE DVT  US 3/29 showing occlusive DVT of bilateral posterior tibial veins and soleal veins to mid calf.  - continue on therapeutic lovenox     Epistaxis  Has experienced mild intermittent epistaxis while on therapeutic anticoagulation.  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    - Na remains stable; now with mild LE edema but already urinating nearly 3L past 24 hours so will hold on any lasix as will likely improve with discontinuation of TPN 4/9    Steroid induced hyperglycemia  - continue high dose ssi     Urinary incontinence  UA negative 4/2  - continue pure wick      Hypokalemia, resolved     GBM s/p resection 2/23/21  Followed by Dr. Baker of Neuro-Oncology.  She is in process of being set up to start chemotherapy and XRT next week.   Appears to have been on prolonged steroids following neurosurgery as was discharged without plan for taper and initiated taper only recently in follow up with Dr. Baker.  - now on prednisone as above with plan for prolonged taper  - has not yet initiated chemo (temozolomide) and planned radiation  - daughter reporting she had been working with SLP for language/cognition following her surgery, will order consult     Hypertension  - continue PTA amlodipine    Sinus  tachycardia  Developed persistent tachycardia starting 4/8; mostly suspect due to increased activity.  VBG shows good pO2 levels, continuing to wean down on oxygen, no significant pain or anxiety.  - switch albuterol to levalbuterol as above  - prn IV metoprolol  - has been mildly hypertensive, could consider low dose oral metoprolol; monitor for now  ADDENDUM:  Pressures and rates up again this afternoon, will start metoprolol 12.5 mg bid.      Asthma  MARCELLE  - nebs as above  - resume CPAP with home setting 4/9     Depression and anxiety  Insomnia  - continue PTA Prozac and Buspar  - continue PTA Atarax for sleep  - prn lorazepam     GERD  - continue PTA Pepcid     Chronic anemia  Baseline hgb 10-11 g/dL, gradually trended slightly down this admission likely due to marrow suppression in acute illness.  - hgb stable 9-10 g/dL on 4/8     Non-severe malnutrition  Noted decrease oral intake and drop in albumin, subcutaneous fat loss on exam.   - PICC line in place  - excellent oral intake, will taper off TPN 4/9         Diet: Snacks/Supplements Adult: Boost Shake; Between Meals  Regular Diet Adult  parenteral nutrition - Clinimix E  Snacks/Supplements Adult: Magic Cup; With Meals  Room Service  Calorie Counts    DVT Prophylaxis: therapeutic lovenox  Martino Catheter: not present  Code Status: Full Code           Disposition Plan   Expected discharge: 1-2 days, recommended to ARU once safe disposition plan/ TCU bed available and weaned from TPN.  Entered: Doug Almaguer MD 04/09/2021, 8:41 AM       The patient's care was discussed with the Bedside Nurse, Patient and Patient's Family.    Doug Almaguer MD  Hospitalist Service  [facility]  Contact information available via [facility] Paging/Directory    ______________________________________________________________________    Interval History   No further issues with epistaxis overnight.  Reports dyspnea about the same today, did note some racing  palpitations overnight.  No fever/chills.  Chest pressure continuing to improve. Feels nebs are helpful.     Data reviewed today: I reviewed all medications, new labs and imaging results over the last 24 hours. I personally reviewed no images or EKG's today.    Physical Exam   Vital Signs: Temp: 97.7  F (36.5  C) Temp src: Oral BP: 125/69 Pulse: 102   Resp: 21 SpO2: 96 % O2 Device: Nasal cannula Oxygen Delivery: 4 LPM  Weight: 144 lbs 6.42 oz     General Appearance: Thin female in NAD, sitting up in chair  Respiratory: bibasilar crackles slowly improving, no wheezing, no tachypnea on NC  Cardiovascular: mildly tachycardic, regular rhythm, normal s1/s2 with grade 1/6 systolic murmur, trace peripheral edema  GI: abdomen soft, nontender, normal bowel sounds  Other: Alert and appropriate, cranial nerves grossly intact     Data   Recent Labs   Lab 04/09/21  0555 04/08/21  1655 04/08/21  0628 04/07/21  0555 04/05/21  0640 04/05/21  0640   WBC  --  15.0* 15.7* 15.5*   < > 15.4*   HGB  --  8.8* 8.9* 9.9*   < > 9.1*   MCV  --  90 88 89   < > 88   PLT  --  220 230 228   < > 214   INR  --   --   --   --   --  1.02   *  --  132* 128*   < > 130*   POTASSIUM 4.5  --  4.5 5.2   < > 4.7   CHLORIDE 100  --  99 95   < > 98   CO2 28  --  28 27   < > 26   BUN 27  --  22 22   < > 25   CR 0.54  --  0.56 0.53   < > 0.50*   ANIONGAP 4  --  5 6   < > 6   ESTIVEN 8.7  --  9.0 8.6   < > 8.7   *  --  130* 207*   < > 165*   ALBUMIN  --   --   --   --   --  2.3*   PROTTOTAL  --   --   --   --   --  5.8*   BILITOTAL  --   --   --   --   --  0.3   ALKPHOS  --   --   --   --   --  90   ALT  --   --   --   --   --  28   AST  --   --   --   --   --  28    < > = values in this interval not displayed.

## 2021-04-09 NOTE — PROGRESS NOTES
04/09/21 0858   Quick Adds   Type of Visit Initial Occupational Therapy Evaluation   Living Environment   People in home alone   Current Living Arrangements house   Home Accessibility stairs to enter home   Number of Stairs, Main Entrance 3   Living Environment Comments Pt eventually plans to discharge to her daughter's home   Self-Care   Usual Activity Tolerance good   Current Activity Tolerance fair   Equipment Currently Used at Home cane, quad;walker, rolling;grab bar, toilet;shower chair;grab bar, tub/shower  (handicap height toilet. Step in shower)   Activity/Exercise/Self-Care Comment At baseline is independent in self-cares without gait aid. Since ARU discharge has been staying at daughter's home and attending OP therapy with a focus on strength and comprehension   Disability/Function   Fall history within last six months yes   Number of times patient has fallen within last six months 1   General Information   Onset of Illness/Injury or Date of Surgery 03/26/21   Referring Physician Doug Almaguer MD   Patient/Family Therapy Goal Statement (OT) Improve independence   Additional Occupational Profile Info/Pertinent History of Current Problem Past medical history that is most notable for recent craniotomy and resection of glioblastoma multiforme on 2/23/21, as well as uncomplicated asthma, who presents with dyspnea and is found to have acute bilateral pneumonia.   Existing Precautions/Restrictions fall;oxygen therapy device and L/min  (4L)   Cognitive Status Examination   Orientation Status orientation to person, place and time   Affect/Mental Status (Cognitive) WFL   Follows Commands follows two-step commands   Executive Function Deficit minimal deficit;planning/decision-making;problem-solving/reasoning   Visual Perception   Visual Impairment/Limitations corrective lenses full-time   Impact of Vision Impairment on Function (Vision) significantly reduced peripheral vision bilaterally. intact tracking in all  visual fields. slowed saccades when looking down. Near point of convergence of ~16 inches from nose   Sensory   Sensory Comments Intact BUE light touch. Denies BUE/BLE numbness or tingling   Pain Assessment   Patient Currently in Pain No   Range of Motion Comprehensive   Comment, General Range of Motion Baseline shoulder impairments. APproximately 90 degrees of flexion   Strength Comprehensive (MMT)   Comment, General Manual Muscle Testing (MMT) Assessment R handed. Reduced gross grasp   Coordination   Coordination Comments intact finger to digit opposition. Mildly slowed finger to nose touching with eyes closed   Transfers   Transfers sit-stand transfer;toilet transfer   Transfer Skill: Bed to Chair/Chair to Bed   Bed-Chair Winston (Transfers) minimum assist (75% patient effort)   Sit-Stand Transfer   Sit-Stand Winston (Transfers) minimum assist (75% patient effort)   Toilet Transfer   Type (Toilet Transfer) sit-stand;stand-sit   Winston Level (Toilet Transfer) minimum assist (75% patient effort)   Lower Body Dressing Assessment/Training   Winston Level (Lower Body Dressing) minimum assist (75% patient effort)   Grooming Assessment/Training   Winston Level (Grooming) supervision   Instrumental Activities of Daily Living (IADL)   Previous Responsibilities meal prep;housekeeping;laundry;shopping;yardwork;medication management;finances;driving;work   Clinical Impression   Criteria for Skilled Therapeutic Interventions Met (OT) yes   OT Diagnosis decline function   OT Problem List-Impairments impacting ADL activity tolerance impaired;balance;cognition;mobility;range of motion (ROM);sensation;post-surgical precautions   Assessment of Occupational Performance 5 or more Performance Deficits   Identified Performance Deficits dressing, bathing, toileting ,home mgmt, functional mobility   Planned Therapy Interventions (OT) ADL retraining;IADL retraining;balance training;fine motor coordination  training;cognition;transfer training;home program guidelines;progressive activity/exercise;motor coordination training;visual perception   Clinical Decision Making Complexity (OT) low complexity   Therapy Frequency (OT) 6x/week   Predicted Duration of Therapy 5 days   Risk & Benefits of therapy have been explained evaluation/treatment results reviewed;care plan/treatment goals reviewed;risks/benefits reviewed;current/potential barriers reviewed;participants voiced agreement with care plan;patient;participants included   OT Discharge Planning    OT Discharge Recommendation (DC Rec) Acute Rehab Center-Motivated patient will benefit from intensive, interdisciplinary therapy.  Anticipate will be able to tolerate 3 hours of therapy per day   OT Rationale for DC Rec Pt is very motiviated, was independent prior to her crani and has very good family support. Anticipate pt will be able to tolerate 3 hrs of therapy daily. Per PT- Daughter more comfortable with pt going to ARU instead of TCU and aware she cannot take pt home in the state she is in now.    Total Evaluation Time (Minutes)   Total Evaluation Time (Minutes) 11

## 2021-04-09 NOTE — PROGRESS NOTES
LANGUAGE EVALUATION       04/09/21 1400   General Information   Onset of Illness/Injury or Date of Surgery 03/26/21   Pertinent History of Current Problem Patient's PMH is significant for depression, anxiety, asthma, MARCELLE, benign meningioma (2012) who presented with several weeks of word finding/ difficulties expressing self, changes in hand writing MRI revealed left lateral frontoparietal mass with edema she was advised to present to ED was admitted to [facility] 2/17/21 underwent left parietal craniotomy and tumor resection 2/23/21.  Admitted to acute rehab 3/1/21 and discharged home on 3/8/21.  She was readmitted 3/26 for acute bilateral pneumonia.       General Observations Patient pleasant, cooperative.    Type of Evaluation   Type of Evaluation Speech, Language, Cognition   Oral Motor   Oral Musculature   (mild right labial decrease)   Vocal Quality/Secretion Management (Oral Motor)   Vocal Quality (Oral Motor) WFL   Clinical Swallow Evaluation   Feeding Assistance no assistance needed   Clinical Swallow Evaluation: Solid Food Texture Trial   Oral Phase, Solid   (midly slower oral prep)   Speech   Speech Intelligibility (Motor Speech) WNL   Articulation (Motor Speech) WNL   Vocal Loudness (Motor Speech) WNL   Breath Support (Motor Speech) impaired;minimal impairment   Deficits in Speech Respiration Thoracic   Resonance (Motor Speech) WNL   Western Aphasia Battery- Revised Bedside Record From   Spontaneous Speech Content Score (out of 10) 10   Spontaneous Speech Fluency Score (out of 10) 10   Auditory Verbal Comprehension Score (out of 10) 10   Sequential Commands Score (out of 10) 10   Repetition Score (out of 10) 10   Object Naming Score (out of 10) 10   Bedside Aphasia Sum 60   WAB-R Bedside Aphasia Score 100   Reading Score (out of 10) 8   Writing Score (out of 10) 4   Bedside Language Sum 72   Bedside Language Score 90   Aphasia Severity Level Moderate Aphasia   Comments Written expression as primary  language deficit, known cognitive deficits in recent OP testing.    SLP Therapy Assessment/Plan   Criteria for Skilled Therapeutic Interventions Met (SLP Eval) yes;treatment indicated   SLP Diagnosis Mild to moderate written expression deficits, known cognitive deficits   Rehab Potential (SLP Eval) good, to achieve stated therapy goals   Therapy Frequency (SLP Eval) daily   Therapy Plan Review/Discharge Plan (SLP)   Therapy Plan Review (SLP) evaluation/treatment results reviewed;care plan/treatment goals reviewed;risks/benefits reviewed;current/potential barriers reviewed;participants voiced agreement with care plan;participants included;patient   SLP Discharge Planning    SLP Discharge Recommendation (DC Rec) Acute Rehab Center-Motivated patient will benefit from intensive, interdisciplinary therapy.  Anticipate will be able to tolerate 3 hours of therapy per day   SLP Rationale for DC Rec Recommend continue language and cognitive intervention at next level of care.  Patient very motivated and has good family support.    SLP Brief overview of current status  Language testing completed:  Patient presents with mild to moderate written language deficits.  Known cognitive deficits from recent OP assessment 1 month ago, no neuro changes.     Total Evaluation Time   Total Evaluation Time (Minutes) 45

## 2021-04-09 NOTE — PROGRESS NOTES
CALORIE COUNT      Approximate Oral Intake for:  4/8  Calories: 2486  Protein: 89    TPN D15 AA5 to goal 60 mL/hr + Lipids 250 mL daily = 1522 kcals (27 kcal/kg), 72 gm pro (1.3 gm/kg), 216 gm CHO, 33% fat     Total: (TPN + Oral) 4008 kcals (71.3 kcal/kg, 236% of needs) and 161 g protein (2.86g/kg, 192%)    Estimated Needs:    Energy Needs:  4673-1002 kcals (25-30 Kcal/Kg) - overweight   Protein Needs:  67-84 grams protein (1.2-1.5 g pro/Kg) - hypercatabolism with acute illness     Summary:   Above included 3 meals and 3 supplements. Patient met 146% of energy and 106% of protein needs via oral intake alone, recommend discontinuing TPN.      Will continue calorie count with 3 day summary, pending 4/10.     Klaudia Drew  Dietetic Intern

## 2021-04-09 NOTE — PROGRESS NOTES
[facility]    Infectious Disease Progress Note    Date of Service (when I saw the patient): 04/09/2021     Assessment & Plan   Olga Bailey is a 75 year old female who was admitted on 3/26/2021.     Impression:  1. 75 y.o with recent diagnosis of craniotomy and resection of glioblastoma multiforme.   2. Has been on very high dose steroid taper for the past month since the craniotomy. Not on any bactrim prophylaxis before admission.    3. Admitted with shortness of breath.   4. Found to have bilateral ground glass infiltrates on the imaging, very hypoxic.   5. COVID PCR negative x 2.   6. No leucocytosis, procal not elevated.      Recommendations:   High suspicion for PJP pneumonia. LDH high, high beta d glucan unable to confirm with the sputum studies as patient has been unable to provide any sputum   DC zosyn )  And on treatment dose for PJP as we wait on sputum cultures.   Steroids for PJP, taper as able      Day 11/21 septra slow improvement to be expected  but now much better  Obviously with not proven infection conceivably second org, or not PJP but no sign of either, so continued close monitor  Call if issues this WE      Donny Sanabria MD    Interval History   O2 much betterNosebleed now stopped  No fever   No new micro   No new complaints       Physical Exam   Temp: 98.8  F (37.1  C) Temp src: Oral BP: (!) 142/80 Pulse: 109   Resp: 28 SpO2: 95 % O2 Device: Nasal cannula Oxygen Delivery: 4 LPM  Vitals:    04/07/21 0656 04/08/21 0400 04/09/21 0600   Weight: 70.4 kg (155 lb 3.3 oz) 70.2 kg (154 lb 12.2 oz) 65.5 kg (144 lb 6.4 oz)     Vital Signs with Ranges  Temp:  [97.6  F (36.4  C)-99.2  F (37.3  C)] 98.8  F (37.1  C)  Pulse:  [] 109  Resp:  [17-59] 28  BP: (125-145)/(69-87) 142/80  SpO2:  [92 %-97 %] 95 %    Constitutional: Awake, alert  Lungs: diminished bilateral   Cardiovascular: Regular rate and rhythm, normal S1 and S2, and no murmur noted  Abdomen: Normal bowel  sounds, soft, non-distended, non-tender  Skin: No rashes, no cyanosis, no edema  Other:    Medications     dextrose 250 mL (04/07/21 0643)       amLODIPine  10 mg Oral Daily     busPIRone HCl  30 mg Oral BID     enoxaparin ANTICOAGULANT  70 mg Subcutaneous Q12H     famotidine  20 mg Oral BID     FLUoxetine  80 mg Oral Daily     insulin aspart  1-10 Units Subcutaneous TID AC     insulin aspart  1-7 Units Subcutaneous At Bedtime     levalbuterol  1.25 mg Nebulization Q4H While awake     predniSONE  40 mg Oral BID w/meals     sodium chloride (PF)  3 mL Intracatheter Q8H     sulfamethoxazole-trimethoprim  320 mg Intravenous Q8H       Data   All microbiology laboratory data reviewed.  Recent Labs   Lab Test 04/08/21  1655 04/08/21  0628 04/07/21  0555   WBC 15.0* 15.7* 15.5*   HGB 8.8* 8.9* 9.9*   HCT 27.6* 27.7* 26.5*   MCV 90 88 89    230 228     Recent Labs   Lab Test 04/09/21  0555 04/08/21  0628 04/07/21  0555   CR 0.54 0.56 0.53     No lab results found.  Recent Labs   Lab Test 03/29/21 1951 03/29/21  1946   CULT No growth No growth       Attestation:  Total time on the floor involved in the patient's care: 35 minutes. Total time spent in counseling/care coordination: >50%

## 2021-04-09 NOTE — PROGRESS NOTES
IMC discontinued. Pt A&O x4. VSS except HTN on 4L NC. Pt SOB with activity. Tele SR/ST (with activity). CMS intact. Neuros intact. Lungs Dim. BS+, BM-, flatus+. Tolerating regular diet. - N/V. Voiding adequately. Pt denied having pain. Up assist of 1.

## 2021-04-09 NOTE — PLAN OF CARE
[id number]. VSS. A/O. Up-1. O2 weaned to 3L, , acepella done. TPN cont. Brian stocking on. Tolerating diet. Purewick catheter, good UOP. Tele SR.

## 2021-04-09 NOTE — TELEPHONE ENCOUNTER
Daughter Olga called in to report concern with her mother's hospital discharge plan.  She reports ARU was declined due to patient's diagnosis.  She states the inpatient team is currently planning for discharge to TCU, but she was told by the  that no TCUs will accept cancer therapies during their stay.  [place] states she was told by Dr. Baker that there are facilities that will accept patients undergoing cancer treatment.    Called [address] [facility] to relay preference that patient be discharged to a facility that allows for cancer treatment.  She states she is unaware of any facilities that allow for radiation and chemotherapy during the patient's stay.    Routing to Dr. Baker to inquire about discharge plan and facilities.    Erich Hodges, JIANN, RN, OCN  Oncology Care Coordinator  [facility]

## 2021-04-10 ENCOUNTER — APPOINTMENT (OUTPATIENT)
Dept: PHYSICAL THERAPY | Facility: CLINIC | Age: 76
DRG: 166 | End: 2021-04-10
Attending: NURSE PRACTITIONER
Payer: MEDICARE

## 2021-04-10 ENCOUNTER — APPOINTMENT (OUTPATIENT)
Dept: SPEECH THERAPY | Facility: CLINIC | Age: 76
DRG: 166 | End: 2021-04-10
Attending: HOSPITALIST
Payer: MEDICARE

## 2021-04-10 LAB
ANION GAP SERPL CALCULATED.3IONS-SCNC: 7 MMOL/L (ref 3–14)
BUN SERPL-MCNC: 30 MG/DL (ref 7–30)
CALCIUM SERPL-MCNC: 8.8 MG/DL (ref 8.5–10.1)
CHLORIDE SERPL-SCNC: 97 MMOL/L (ref 94–109)
CO2 SERPL-SCNC: 26 MMOL/L (ref 20–32)
CREAT SERPL-MCNC: 0.66 MG/DL (ref 0.52–1.04)
ERYTHROCYTE [DISTWIDTH] IN BLOOD BY AUTOMATED COUNT: 14.8 % (ref 10–15)
GFR SERPL CREATININE-BSD FRML MDRD: 86 ML/MIN/{1.73_M2}
GLUCOSE BLDC GLUCOMTR-MCNC: 126 MG/DL (ref 70–99)
GLUCOSE BLDC GLUCOMTR-MCNC: 159 MG/DL (ref 70–99)
GLUCOSE BLDC GLUCOMTR-MCNC: 234 MG/DL (ref 70–99)
GLUCOSE BLDC GLUCOMTR-MCNC: 86 MG/DL (ref 70–99)
GLUCOSE SERPL-MCNC: 80 MG/DL (ref 70–99)
HCT VFR BLD AUTO: 26.3 % (ref 35–47)
HGB BLD-MCNC: 8.5 G/DL (ref 11.7–15.7)
MCH RBC QN AUTO: 28.6 PG (ref 26.5–33)
MCHC RBC AUTO-ENTMCNC: 32.3 G/DL (ref 31.5–36.5)
MCV RBC AUTO: 89 FL (ref 78–100)
PLATELET # BLD AUTO: 243 10E9/L (ref 150–450)
POTASSIUM SERPL-SCNC: 4.6 MMOL/L (ref 3.4–5.3)
RBC # BLD AUTO: 2.97 10E12/L (ref 3.8–5.2)
SODIUM SERPL-SCNC: 130 MMOL/L (ref 133–144)
WBC # BLD AUTO: 20.1 10E9/L (ref 4–11)

## 2021-04-10 PROCEDURE — 94640 AIRWAY INHALATION TREATMENT: CPT | Mod: 76

## 2021-04-10 PROCEDURE — 258N000003 HC RX IP 258 OP 636: Performed by: HOSPITALIST

## 2021-04-10 PROCEDURE — 99232 SBSQ HOSP IP/OBS MODERATE 35: CPT | Performed by: HOSPITALIST

## 2021-04-10 PROCEDURE — 250N000013 HC RX MED GY IP 250 OP 250 PS 637: Performed by: INTERNAL MEDICINE

## 2021-04-10 PROCEDURE — 999N001017 HC STATISTIC GLUCOSE BY METER IP

## 2021-04-10 PROCEDURE — 92507 TX SP LANG VOICE COMM INDIV: CPT | Mod: GN

## 2021-04-10 PROCEDURE — 999N000157 HC STATISTIC RCP TIME EA 10 MIN

## 2021-04-10 PROCEDURE — 97530 THERAPEUTIC ACTIVITIES: CPT | Mod: GP | Performed by: PHYSICAL THERAPIST

## 2021-04-10 PROCEDURE — 120N000001 HC R&B MED SURG/OB

## 2021-04-10 PROCEDURE — 80048 BASIC METABOLIC PNL TOTAL CA: CPT | Performed by: HOSPITALIST

## 2021-04-10 PROCEDURE — 250N000011 HC RX IP 250 OP 636: Performed by: INTERNAL MEDICINE

## 2021-04-10 PROCEDURE — 250N000013 HC RX MED GY IP 250 OP 250 PS 637: Performed by: HOSPITALIST

## 2021-04-10 PROCEDURE — 250N000009 HC RX 250: Performed by: HOSPITALIST

## 2021-04-10 PROCEDURE — 120N000013 HC R&B IMCU

## 2021-04-10 PROCEDURE — 94640 AIRWAY INHALATION TREATMENT: CPT

## 2021-04-10 PROCEDURE — 999N000040 HC STATISTIC CONSULT NO CHARGE VASC ACCESS

## 2021-04-10 PROCEDURE — 85027 COMPLETE CBC AUTOMATED: CPT | Performed by: HOSPITALIST

## 2021-04-10 PROCEDURE — 99232 SBSQ HOSP IP/OBS MODERATE 35: CPT | Performed by: NURSE PRACTITIONER

## 2021-04-10 PROCEDURE — 250N000012 HC RX MED GY IP 250 OP 636 PS 637: Performed by: HOSPITALIST

## 2021-04-10 PROCEDURE — 97116 GAIT TRAINING THERAPY: CPT | Mod: GP | Performed by: PHYSICAL THERAPIST

## 2021-04-10 PROCEDURE — 999N000190 HC STATISTIC VAT ROUNDS

## 2021-04-10 PROCEDURE — 250N000011 HC RX IP 250 OP 636: Performed by: HOSPITALIST

## 2021-04-10 RX ADMIN — ENOXAPARIN SODIUM 70 MG: 80 INJECTION SUBCUTANEOUS at 11:37

## 2021-04-10 RX ADMIN — FLUOXETINE 80 MG: 20 CAPSULE ORAL at 08:53

## 2021-04-10 RX ADMIN — SULFAMETHOXAZOLE AND TRIMETHOPRIM 320 MG: 80; 16 INJECTION, SOLUTION, CONCENTRATE INTRAVENOUS at 08:59

## 2021-04-10 RX ADMIN — SULFAMETHOXAZOLE AND TRIMETHOPRIM 320 MG: 80; 16 INJECTION, SOLUTION, CONCENTRATE INTRAVENOUS at 01:16

## 2021-04-10 RX ADMIN — AMLODIPINE BESYLATE 10 MG: 10 TABLET ORAL at 08:57

## 2021-04-10 RX ADMIN — LEVALBUTEROL HYDROCHLORIDE 1.25 MG: 1.25 SOLUTION, CONCENTRATE RESPIRATORY (INHALATION) at 15:41

## 2021-04-10 RX ADMIN — Medication 12.5 MG: at 08:57

## 2021-04-10 RX ADMIN — PREDNISONE 40 MG: 20 TABLET ORAL at 18:17

## 2021-04-10 RX ADMIN — BUSPIRONE HYDROCHLORIDE 30 MG: 15 TABLET ORAL at 22:08

## 2021-04-10 RX ADMIN — OXYMETAZOLINE HYDROCHLORIDE 3 SPRAY: 0.05 SPRAY NASAL at 00:40

## 2021-04-10 RX ADMIN — ENOXAPARIN SODIUM 70 MG: 80 INJECTION SUBCUTANEOUS at 22:09

## 2021-04-10 RX ADMIN — Medication 12.5 MG: at 22:10

## 2021-04-10 RX ADMIN — LEVALBUTEROL HYDROCHLORIDE 1.25 MG: 1.25 SOLUTION, CONCENTRATE RESPIRATORY (INHALATION) at 20:40

## 2021-04-10 RX ADMIN — FAMOTIDINE 20 MG: 20 TABLET ORAL at 08:57

## 2021-04-10 RX ADMIN — LEVALBUTEROL HYDROCHLORIDE 1.25 MG: 1.25 SOLUTION, CONCENTRATE RESPIRATORY (INHALATION) at 09:05

## 2021-04-10 RX ADMIN — SULFAMETHOXAZOLE AND TRIMETHOPRIM 320 MG: 80; 16 INJECTION, SOLUTION, CONCENTRATE INTRAVENOUS at 16:04

## 2021-04-10 RX ADMIN — PREDNISONE 40 MG: 20 TABLET ORAL at 08:53

## 2021-04-10 RX ADMIN — FAMOTIDINE 20 MG: 20 TABLET ORAL at 22:10

## 2021-04-10 RX ADMIN — BUSPIRONE HYDROCHLORIDE 30 MG: 15 TABLET ORAL at 08:53

## 2021-04-10 RX ADMIN — HYDROXYZINE HYDROCHLORIDE 50 MG: 25 TABLET, FILM COATED ORAL at 22:18

## 2021-04-10 ASSESSMENT — MIFFLIN-ST. JEOR: SCORE: 1168.13

## 2021-04-10 ASSESSMENT — ACTIVITIES OF DAILY LIVING (ADL)
ADLS_ACUITY_SCORE: 18

## 2021-04-10 NOTE — PROGRESS NOTES
Care Management Follow Up    Length of Stay (days): 15    Expected Discharge Date: 04/11/21(TCU)     Concerns to be Addressed: discharge planning  Pt states she is thinking about staying with her daughter at discharge. SW explained the potential for a TCU placement. Pt reports if it is something she needs to do, she will do it.  Patient plan of care discussed at interdisciplinary rounds: Yes    Anticipated Discharge Disposition: Home, Home Care, Transitional Care  Disposition Comments: Discuss the potential discharge planning for TCU or home with daughter  Anticipated Discharge Services: Other (see comment)(Therapy)  Anticipated Discharge DME: None    Patient/family educated on Medicare website which has current facility and service quality ratings:    Education Provided on the Discharge Plan:    Patient/Family in Agreement with the Plan:      Referrals Placed by CM/CAMRON: Internal Clinic Care Coordination, Post Acute Facilities, Transportation, Communication hand-offs to next level of Care Providers  Private pay costs discussed: transportation costs and insurance costs out of pocket expenses and co-pays    Additional Information:  CAMRON received VM from Farhan at [facility] who stated they have no openings at [facility] until mid to late next week.  CAMRON noted that TCU referral was declined. SW to obtain additional TCU choices.    SW to continue to follow and assist with discharge planning.    DAYNA Brown  Daytime (8:00am-4:30pm): [phone]  After-Hours SW Pager (4:30pm-11:30pm): [phone]             DAYNA Moran

## 2021-04-10 NOTE — PROGRESS NOTES
Patient is having a nose bleeding. Ice pack applied but of no help. Paged  Hospitalist. Tracee Newman. Still waiting for call back.

## 2021-04-10 NOTE — PROGRESS NOTES
Pt A&O x4. VSS on 4L NC. Pt SOB with activity. Tele SR/ST (with activity). CMS intact. Neuros intact. Lungs Dim. Non productive cough. BS+, BM+, flatus+. Tolerating regular diet. - N/V. Voiding adequately. Pt denied having pain. Up assist of 1.

## 2021-04-10 NOTE — PROGRESS NOTES
[facility]    Medicine Progress Note - Hospitalist Service       Date of Admission:  3/26/2021  Assessment & Plan       Olga Bailey is a 75 year old female admitted on 3/26/2021.  Past medical history that is most notable for recent craniotomy and resection of glioblastoma multiforme, as well as uncomplicated asthma, who presents with dyspnea and is found to have acute bilateral pneumonia.       Acute hypoxic respiratory failure due to bilateral pneumonia, suspected PJP  * Presents with progressive dyspnea.  Afebrile without leukocytosis but left shift and lymphopenia noted on differential.  Admission CT showing bilateral patchy groundglass opacities, negative for PE.  Initially treated with ceftriaxone and doxy.   * COVID19 negative 3/26 and 3/29.  * Procal low 3/28 and 3/29  * Increased oxygen needs 3/29 (ultimately requiring Bipap) with repeat CXR showing increase in bilateral patchy opacities  * Repeat COVID, procal, trop, BNP negative, resp viral panel negative, aspergillus studies, EKG unremarkable on 3/29  * TTE 3/29 with EF 50-55% without WMA, mildly decreased RV systolic function with mild dilation.  * Found to have DVT 3/29 but with normal hemodynamics, normal trop and BNP, no significant RV strain on echo and risk for renal injury with contrast and initiation of high dose Bactrim, did not pursue CT imaging given very low concern for saddle embolus with need for thrombectomy.  * ID consulted 3/29, suspect PJP with prolonged steroid course following neurosurgery (see below).  * Switched from ceftriaxone to zosyn 3/29 and initiated on high dose Bactrim and increased steroids  * Completed 5-day course of doxycycline 4/1 and 7-day course of zosyn 4/4  * 1, 3 beta glucan and fungitell or positive and likely indicative of PJP     - currently on high dose Bactrim (dose 11/21) and prednisone 40 mg bid  - switch albuterol to levalbuterol q4h while awake due to tachycardia  4/9  - stable off Bipap and HFNC >24 hours, will discontinue IMC status  - so far unable to produce any sputum for confirmatory culture  - will require repeat CT in early May to reassess infiltrates per Pulm  - Pulm and ID recs much appreciated     Leukocytosis  Persistent leukocytosis since 4/3, suspect steroids vs reactive.  Afebrile with multiple procalcitonin studies negative without new signs/symptoms of infection.   - remains stable - likely iatrogenic with steroids     Bilateral LE DVT  US 3/29 showing occlusive DVT of bilateral posterior tibial veins and soleal veins to mid calf.  - continue on therapeutic lovenox      Epistaxis  Has experienced mild intermittent epistaxis while on therapeutic anticoagulation.  - conservative measures: humidified air, cold packs, pressure  - use Afrin prn to effected nostril when above measures ineffective  - no further bleeding overnight; suspect this was largely due to drying effect of Bipap/HFNC  - early 4/10 (overnight) - recurrence noted, greatly appreciated House staff addressing and packing last night. No new clot or bleeding noted in posterior OP. Packing in place. Patient, daughter in agreement to leave in place for today.  If recurrent and persistent, will have request ENT and consider stopping anticoagulation.     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    - Na remains stable; now with mild LE edema but already urinating nearly 3L past 24 hours so will hold on any lasix as will likely improve with discontinuation of TPN 4/9     Steroid induced hyperglycemia  - continue high dose ssi     Urinary incontinence  UA negative 4/2  - continue pure wick      Hypokalemia, resolved     GBM s/p resection 2/23/21  Followed by Dr. Baker of Neuro-Oncology.  She is in process of being set up to start chemotherapy and XRT next week.   Appears to have been on prolonged steroids following neurosurgery as was discharged without plan for taper  and initiated taper only recently in follow up with Dr. Baker.  - now on prednisone as above with plan for prolonged taper  - has not yet initiated chemo (temozolomide) and planned radiation  - daughter reporting she had been working with SLP for language/cognition following her surgery, will order consult     Hypertension  - continue PTA amlodipine     Sinus tachycardia  Developed persistent tachycardia starting 4/8; mostly suspect due to increased activity.  VBG shows good pO2 levels, continuing to wean down on oxygen, no significant pain or anxiety.  - switch albuterol to levalbuterol as above  - prn IV metoprolol  - has been mildly hypertensive, could consider low dose oral metoprolol; monitor for now  ADDENDUM:  Pressures and rates up again this afternoon, will start metoprolol 12.5 mg bid.      Asthma  MARCELLE  - nebs as above  - resume CPAP with home setting 4/9     Depression and anxiety  Insomnia  - continue PTA Prozac and Buspar  - continue PTA Atarax for sleep  - prn lorazepam     GERD  - continue PTA Pepcid     Chronic anemia  Baseline hgb 10-11 g/dL, gradually trended slightly down this admission likely due to marrow suppression in acute illness.  - hgb stable 9-10 g/dL on 4/8     Non-severe malnutrition  Noted decrease oral intake and drop in albumin, subcutaneous fat loss on exam.   - PICC line in place  - excellent oral intake, tapered off TPN 4/9       Diet: Snacks/Supplements Adult: Boost Shake; Between Meals  Regular Diet Adult  Snacks/Supplements Adult: Magic Cup; With Meals  Room Service  Calorie Counts  Advance Diet as Tolerated    DVT Prophylaxis: Enoxaparin (Lovenox) SQ  Martino Catheter: not present  Code Status: Full Code           Disposition Plan   Expected discharge: unclear - possibly Monday pending O2 needs which are improving and plans with oncology regarding treatment and various options. Ideally patient would not go to TCU and be isolated for 2 weeks which would also delay treatment.  Daughter has spoken with Dr Baker's office and is awaiting call back in next day or 2 with options previously presented that were TCU but could also provide cancer treatment.  Entered: Duke Bernabe MD 04/10/2021, 10:14 AM       The patient's care was discussed with the Bedside Nurse, Patient and Patient's Family.    Duke Bernabe MD  Hospitalist Service  [facility]  Contact information available via [facility] Paging/Directory    ______________________________________________________________________    Interval History   Assumed care today.  Patient familiar to me from previous admission.  Up in chair after lunch and feeling generally fatigued.  Otherwise no new complaints.  Denies fevers, chills, new cough, or pain.  Stable on about 4 L nasal cannula.  Of note she had a recurrence of epistaxis last night-greatly appreciate housestaff addressing this and currently remains packed and without active bleeding.  Patient wishes to keep this in for now which would be reasonable.  Continuing steroids and Bactrim.  Will need to further explore discharge options with regards to where she can go and hopefully get treatment at the same time.  If she goes to TCU she would likely have to be isolated and unable to get treatment started.  Patient's daughter who is a nurse here has spoken with Dr. Patterson's office who apparently reported options for treatment at certain facilities.    Data reviewed today: I reviewed all medications, new labs and imaging results over the last 24 hours. I personally reviewed no images or EKG's today.    Physical Exam   Vital Signs: Temp: 97.9  F (36.6  C) Temp src: Oral BP: 130/80 Pulse: 103   Resp: 24 SpO2: 97 % O2 Device: Nasal cannula Oxygen Delivery: 4 LPM  Weight: 155 lbs 3.26 oz    Gen: NAD, pleasant, fatigued  HEENT: Normocephalic, EOMI, MMM  Resp: diminished breath sounds in general, no focal crackles,  no wheezes, no increased work of resp  CV: S1S2 heard, reg  rhythm, reg rate  Abdo: soft, nontender, nondistended, bowel sounds present  Ext: calves nontender, well perfused, compression stockings in place  Neuro: AAOx3, CN grossly intact, no facial asymmetry      Data   Recent Labs   Lab 04/10/21  0640 04/09/21  0555 04/08/21  1655 04/08/21  0628 04/05/21  0640 04/05/21  0640   WBC 20.1*  --  15.0* 15.7*   < > 15.4*   HGB 8.5*  --  8.8* 8.9*   < > 9.1*   MCV 89  --  90 88   < > 88     --  220 230   < > 214   INR  --   --   --   --   --  1.02   * 132*  --  132*   < > 130*   POTASSIUM 4.6 4.5  --  4.5   < > 4.7   CHLORIDE 97 100  --  99   < > 98   CO2 26 28  --  28   < > 26   BUN 30 27  --  22   < > 25   CR 0.66 0.54  --  0.56   < > 0.50*   ANIONGAP 7 4  --  5   < > 6   ESTIVEN 8.8 8.7  --  9.0   < > 8.7   GLC 80 131*  --  130*   < > 165*   ALBUMIN  --   --   --   --   --  2.3*   PROTTOTAL  --   --   --   --   --  5.8*   BILITOTAL  --   --   --   --   --  0.3   ALKPHOS  --   --   --   --   --  90   ALT  --   --   --   --   --  28   AST  --   --   --   --   --  28    < > = values in this interval not displayed.     No results found for this or any previous visit (from the past 24 hour(s)).

## 2021-04-10 NOTE — PLAN OF CARE
PT:  Discussed with patient and daughter the benefits of patient taking small walks in room throughout the day as she can only walk short distances before de-saturating.  Spoke with nursing who is in agreement that short walks with nursing would be indicated. Recommend as able.

## 2021-04-10 NOTE — PROGRESS NOTES
House RICKY brief note:    Was paged by nursing at 0038 for ongoing epistaxis despite afrin and pressure.  Nursing notes epistaxis has been ongoing for the past ~ 1 hour.  At time of arrival, pt sitting upright in bed, awake, alert, in no apparent distress with noted sanguinous drainage from both nares, also present on gown and several linen items.  Requested for pt to clear nasal passage with removal of moderate size clot from R nare.  Unable to visualize any obvious source of bleeding.  No active bleeding noted at this time.  Proceeded to place 1 afrin soaked pledget into R nare and held pressure on bridge of nose for ~ 5-7 min.  No further bleeding noted.  Evaluated pt's oropharynx noting large posteropharyngeal blood clot.  With use of Yankauer and kleenex, able to remove large blood clot, ~ 3-4 inches long from oropharynx.  No obvious bleeding noted in posterior aspect of oropharynx, and pt reports no obvious trickle of fluid down back of throat.  Pt remains hemodynamically stable (not overtly hypertensive), warm, pink, dry, mentating.  Placed pt on oxymask 3L as unable to wear NC at this time.      Nasal packing with pledget and nasal sling to remain in place at this time.  Will defer ENT consult at this time.    KAYCE Carmona, CNP  House RICKY    I spent 25 min at pt's bedside and on unit managing and coordinating pt's overall plan of care.

## 2021-04-10 NOTE — PLAN OF CARE
Patient is alert and oriented  x4.  PICC lines flushed blood returns and salined locked. PIV in the left removed due to blood clot and redness. A1 with transfers, Had nose bleeding that started at around 2230. Ice pack applied but of no help. Paged hospitalist and  Nicolás, NP came. Packing with afrin done by NP. Big size f blood clot was removed by NP from the throat area.  Treatment was effective. Frequent checks done to the patient. Oxymask use overnight and patient was sating good. Anxious.  Purewick used at night. Denied pain. Able to make all needs known.

## 2021-04-10 NOTE — PROGRESS NOTES
CLINICAL NUTRITION SERVICES - REASSESSMENT NOTE      Malnutrition: (4/5)  % Weight Loss:  None noted  % Intake:  </= 50% for >/= 5 days (severe malnutrition)   Subcutaneous Fat Loss: None noted  Muscle Loss: Mild clavicle wasting, mild deltoid wasting, mild dorsal wasting.  Fluid Retention: +1 BLE      Malnutrition Diagnosis: Non Severe Malnutrition in the context of acute on chronic illness.       EVALUATION OF PROGRESS TOWARD GOALS   Diet:    Regular  Ensure shake at 10am and 2pm  Magic cup at dinner    Nutrition Support:    TPN dc'd yesterday    Intake/Tolerance:    Chart reviewed    4/7: 1315 kcals and 54g pro   4/8: 2486 kcals and 89 g pro   4/9: 1967 kcals and 98 g pro  3 day calorie count reflect that average intake = 1925 cals (100% est needs) and 80 gm pro (100% est needs)    Pt is consuming meals and the nutrition supplements      ASSESSED NUTRITION NEEDS:  Dosing Weight: 56.2 kg (adjusted)  Energy Needs:  5953-6822 kcals (25-30 Kcal/Kg) - overweight   Protein Needs:  67-84 grams protein (1.2-1.5 g pro/Kg) - hypercatabolism with acute illness       NEW FINDINGS:   Working on TCU placement    Vitals:    03/26/21 1701 03/29/21 0730 03/30/21 0632 03/31/21 0600   Weight: 69.9 kg (154 lb) 68.9 kg (151 lb 12.8 oz) 68.7 kg (151 lb 8 oz) 68.3 kg (150 lb 9.6 oz)    04/01/21 0625 04/05/21 0400 04/06/21 0600 04/07/21 0656   Weight: 68.1 kg (150 lb 2.1 oz) 70 kg (154 lb 6.4 oz) 71 kg (156 lb 8.4 oz) 70.4 kg (155 lb 3.3 oz)    04/08/21 0400 04/09/21 0600 04/10/21 0600   Weight: 70.2 kg (154 lb 12.2 oz) 65.5 kg (144 lb 6.4 oz) 70.4 kg (155 lb 3.3 oz)         Previous Goals (4/5):   Transition patient from TPN to TF if nutrition support continues to be necessary.   Evaluation: no longer applies as pt off nutrition support    Previous Nutrition Diagnosis (4/5):   Inadequate oral intake related to impaired respiratory function and decreased appetite as evidenced by oral intake poor to fair at best, patient reliance on TPN  for 100% of nutrition needs.  Evaluation: Improving        CURRENT NUTRITION DIAGNOSIS  No nutrition diagnosis identified at this time       INTERVENTIONS  Recommendations / Nutrition Prescription  Regular diet  Nutrition supplements    Implementation  Continue nutrition supplements as above    Goals  Pt to consume 75% meals and supplements      MONITORING AND EVALUATION:  Progress towards goals will be monitored and evaluated per protocol and Practice Guidelines

## 2021-04-11 ENCOUNTER — APPOINTMENT (OUTPATIENT)
Dept: PHYSICAL THERAPY | Facility: CLINIC | Age: 76
DRG: 166 | End: 2021-04-11
Attending: NURSE PRACTITIONER
Payer: MEDICARE

## 2021-04-11 LAB
BASOPHILS # BLD AUTO: 0 10E9/L (ref 0–0.2)
BASOPHILS NFR BLD AUTO: 0 %
DIFFERENTIAL METHOD BLD: ABNORMAL
EOSINOPHIL # BLD AUTO: 0.3 10E9/L (ref 0–0.7)
EOSINOPHIL NFR BLD AUTO: 2 %
ERYTHROCYTE [DISTWIDTH] IN BLOOD BY AUTOMATED COUNT: 14.6 % (ref 10–15)
GLUCOSE BLDC GLUCOMTR-MCNC: 148 MG/DL (ref 70–99)
GLUCOSE BLDC GLUCOMTR-MCNC: 192 MG/DL (ref 70–99)
GLUCOSE BLDC GLUCOMTR-MCNC: 83 MG/DL (ref 70–99)
GLUCOSE BLDC GLUCOMTR-MCNC: 99 MG/DL (ref 70–99)
HCT VFR BLD AUTO: 23.7 % (ref 35–47)
HGB BLD-MCNC: 7.8 G/DL (ref 11.7–15.7)
HYPOCHROMIA BLD QL: PRESENT
LYMPHOCYTES # BLD AUTO: 1.5 10E9/L (ref 0.8–5.3)
LYMPHOCYTES NFR BLD AUTO: 10 %
MCH RBC QN AUTO: 28.9 PG (ref 26.5–33)
MCHC RBC AUTO-ENTMCNC: 32.9 G/DL (ref 31.5–36.5)
MCV RBC AUTO: 88 FL (ref 78–100)
METAMYELOCYTES # BLD: 0.3 10E9/L
METAMYELOCYTES NFR BLD MANUAL: 2 %
MONOCYTES # BLD AUTO: 1 10E9/L (ref 0–1.3)
MONOCYTES NFR BLD AUTO: 7 %
MYELOCYTES # BLD: 0.1 10E9/L
MYELOCYTES NFR BLD MANUAL: 1 %
NEUTROPHILS # BLD AUTO: 11.4 10E9/L (ref 1.6–8.3)
NEUTROPHILS NFR BLD AUTO: 78 %
NRBC # BLD AUTO: 0.1 10*3/UL
NRBC BLD AUTO-RTO: 1 /100
OVALOCYTES BLD QL SMEAR: SLIGHT
PLATELET # BLD AUTO: 186 10E9/L (ref 150–450)
PLATELET # BLD EST: ABNORMAL 10*3/UL
POLYCHROMASIA BLD QL SMEAR: SLIGHT
RBC # BLD AUTO: 2.7 10E12/L (ref 3.8–5.2)
WBC # BLD AUTO: 14.6 10E9/L (ref 4–11)

## 2021-04-11 PROCEDURE — 999N001017 HC STATISTIC GLUCOSE BY METER IP

## 2021-04-11 PROCEDURE — 85025 COMPLETE CBC W/AUTO DIFF WBC: CPT | Performed by: HOSPITALIST

## 2021-04-11 PROCEDURE — 250N000012 HC RX MED GY IP 250 OP 636 PS 637: Performed by: HOSPITALIST

## 2021-04-11 PROCEDURE — 250N000013 HC RX MED GY IP 250 OP 250 PS 637: Performed by: HOSPITALIST

## 2021-04-11 PROCEDURE — 999N000157 HC STATISTIC RCP TIME EA 10 MIN

## 2021-04-11 PROCEDURE — 94640 AIRWAY INHALATION TREATMENT: CPT

## 2021-04-11 PROCEDURE — 258N000003 HC RX IP 258 OP 636: Performed by: HOSPITALIST

## 2021-04-11 PROCEDURE — 120N000013 HC R&B IMCU

## 2021-04-11 PROCEDURE — 99232 SBSQ HOSP IP/OBS MODERATE 35: CPT | Performed by: HOSPITALIST

## 2021-04-11 PROCEDURE — 94640 AIRWAY INHALATION TREATMENT: CPT | Mod: 76

## 2021-04-11 PROCEDURE — 250N000011 HC RX IP 250 OP 636: Performed by: INTERNAL MEDICINE

## 2021-04-11 PROCEDURE — 97110 THERAPEUTIC EXERCISES: CPT | Mod: GP | Performed by: PHYSICAL THERAPIST

## 2021-04-11 PROCEDURE — 250N000009 HC RX 250: Performed by: HOSPITALIST

## 2021-04-11 PROCEDURE — 999N000190 HC STATISTIC VAT ROUNDS

## 2021-04-11 PROCEDURE — 120N000001 HC R&B MED SURG/OB

## 2021-04-11 PROCEDURE — 250N000011 HC RX IP 250 OP 636: Performed by: HOSPITALIST

## 2021-04-11 PROCEDURE — 250N000013 HC RX MED GY IP 250 OP 250 PS 637: Performed by: INTERNAL MEDICINE

## 2021-04-11 PROCEDURE — 97116 GAIT TRAINING THERAPY: CPT | Mod: GP | Performed by: PHYSICAL THERAPIST

## 2021-04-11 RX ORDER — NYSTATIN 100000/ML
500000 SUSPENSION, ORAL (FINAL DOSE FORM) ORAL 4 TIMES DAILY
Status: DISCONTINUED | OUTPATIENT
Start: 2021-04-11 | End: 2021-04-17

## 2021-04-11 RX ADMIN — SULFAMETHOXAZOLE AND TRIMETHOPRIM 320 MG: 80; 16 INJECTION, SOLUTION, CONCENTRATE INTRAVENOUS at 08:10

## 2021-04-11 RX ADMIN — OXYMETAZOLINE HYDROCHLORIDE 3 SPRAY: 0.05 SPRAY NASAL at 16:31

## 2021-04-11 RX ADMIN — Medication 12.5 MG: at 21:16

## 2021-04-11 RX ADMIN — FAMOTIDINE 20 MG: 20 TABLET ORAL at 21:16

## 2021-04-11 RX ADMIN — SULFAMETHOXAZOLE AND TRIMETHOPRIM 320 MG: 80; 16 INJECTION, SOLUTION, CONCENTRATE INTRAVENOUS at 15:35

## 2021-04-11 RX ADMIN — BUSPIRONE HYDROCHLORIDE 30 MG: 15 TABLET ORAL at 08:15

## 2021-04-11 RX ADMIN — LORAZEPAM 0.5 MG: 0.5 TABLET ORAL at 13:46

## 2021-04-11 RX ADMIN — SULFAMETHOXAZOLE AND TRIMETHOPRIM 320 MG: 80; 16 INJECTION, SOLUTION, CONCENTRATE INTRAVENOUS at 23:48

## 2021-04-11 RX ADMIN — Medication 12.5 MG: at 08:15

## 2021-04-11 RX ADMIN — AMLODIPINE BESYLATE 10 MG: 10 TABLET ORAL at 08:16

## 2021-04-11 RX ADMIN — BUSPIRONE HYDROCHLORIDE 30 MG: 15 TABLET ORAL at 21:15

## 2021-04-11 RX ADMIN — FLUOXETINE 80 MG: 20 CAPSULE ORAL at 08:15

## 2021-04-11 RX ADMIN — LEVALBUTEROL HYDROCHLORIDE 1.25 MG: 1.25 SOLUTION, CONCENTRATE RESPIRATORY (INHALATION) at 08:34

## 2021-04-11 RX ADMIN — HYDROXYZINE HYDROCHLORIDE 50 MG: 25 TABLET, FILM COATED ORAL at 21:15

## 2021-04-11 RX ADMIN — LEVALBUTEROL HYDROCHLORIDE 1.25 MG: 1.25 SOLUTION, CONCENTRATE RESPIRATORY (INHALATION) at 12:14

## 2021-04-11 RX ADMIN — PREDNISONE 40 MG: 20 TABLET ORAL at 08:15

## 2021-04-11 RX ADMIN — LEVALBUTEROL HYDROCHLORIDE 1.25 MG: 1.25 SOLUTION, CONCENTRATE RESPIRATORY (INHALATION) at 19:47

## 2021-04-11 RX ADMIN — ENOXAPARIN SODIUM 70 MG: 80 INJECTION SUBCUTANEOUS at 13:08

## 2021-04-11 RX ADMIN — PREDNISONE 40 MG: 20 TABLET ORAL at 18:28

## 2021-04-11 RX ADMIN — ENOXAPARIN SODIUM 70 MG: 80 INJECTION SUBCUTANEOUS at 21:16

## 2021-04-11 RX ADMIN — NYSTATIN 500000 UNITS: 100000 SUSPENSION ORAL at 21:15

## 2021-04-11 RX ADMIN — FAMOTIDINE 20 MG: 20 TABLET ORAL at 08:15

## 2021-04-11 RX ADMIN — SULFAMETHOXAZOLE AND TRIMETHOPRIM 320 MG: 80; 16 INJECTION, SOLUTION, CONCENTRATE INTRAVENOUS at 00:01

## 2021-04-11 ASSESSMENT — ACTIVITIES OF DAILY LIVING (ADL)
ADLS_ACUITY_SCORE: 22
ADLS_ACUITY_SCORE: 18
ADLS_ACUITY_SCORE: 22

## 2021-04-11 NOTE — PROGRESS NOTES
Pt A&O x4. VSS on 5L NC. Pt improving but still has some SOB with activity. Tele SR. CMS intact. Neuros intact. Lungs Dim. Non productive cough. BS+, BM-, flatus+. Tolerating regular diet. - N/V. Voiding adequately. Pt denied having pain. Up assist of 1.     Pt requested and received PRN ativan this afternoon. Pt was on the phone with someone and seemed upset. Medication helpful in reducing her anxiety.

## 2021-04-11 NOTE — PROGRESS NOTES
[facility]    Medicine Progress Note - Hospitalist Service       Date of Admission:  3/26/2021  Assessment & Plan       Olga Bailey is a 75 year old female admitted on 3/26/2021.  Past medical history that is most notable for recent craniotomy and resection of glioblastoma multiforme, as well as uncomplicated asthma, who presents with dyspnea and is found to have acute bilateral pneumonia.     Acute hypoxic respiratory failure due to bilateral pneumonia, suspected PJP  * Presents with progressive dyspnea.  Afebrile without leukocytosis but left shift and lymphopenia noted on differential.  Admission CT showing bilateral patchy groundglass opacities, negative for PE.  Initially treated with ceftriaxone and doxy.   * COVID19 negative 3/26 and 3/29.  * Procal low 3/28 and 3/29  * Increased oxygen needs 3/29 (ultimately requiring Bipap) with repeat CXR showing increase in bilateral patchy opacities  * Repeat COVID, procal, trop, BNP negative, resp viral panel negative, aspergillus studies, EKG unremarkable on 3/29  * TTE 3/29 with EF 50-55% without WMA, mildly decreased RV systolic function with mild dilation.  * Found to have DVT 3/29 but with normal hemodynamics, normal trop and BNP, no significant RV strain on echo and risk for renal injury with contrast and initiation of high dose Bactrim, did not pursue CT imaging given very low concern for saddle embolus with need for thrombectomy.  * ID consulted 3/29, suspect PJP with prolonged steroid course following neurosurgery (see below).  * Switched from ceftriaxone to zosyn 3/29 and initiated on high dose Bactrim and increased steroids  * Completed 5-day course of doxycycline 4/1 and 7-day course of zosyn 4/4  * 1, 3 beta glucan and fungitell or positive and likely indicative of PJP     - currently on high dose Bactrim (dose 13/21) and prednisone 40 mg bid  - switch albuterol to levalbuterol q4h while awake due to tachycardia 4/9  - stable  off Bipap and HFNC >24 hours, discontinued IMC status  - so far unable to produce any sputum for confirmatory culture  - will require repeat CT in early May to reassess infiltrates per Pulm  - Pulm and ID recs much appreciated - need to get clear plan for steroids upon discharge     Leukocytosis  Persistent leukocytosis since 4/3, suspect steroids vs reactive.  Afebrile with multiple procalcitonin studies negative without new signs/symptoms of infection.   - remains stable - likely iatrogenic with steroids     Bilateral LE DVT  US 3/29 showing occlusive DVT of bilateral posterior tibial veins and soleal veins to mid calf.  - continue on therapeutic lovenox      Epistaxis  Has experienced mild intermittent epistaxis while on therapeutic anticoagulation.  - conservative measures: humidified air, cold packs, pressure  - use Afrin prn to effected nostril when above measures ineffective  - no further bleeding overnight; suspect this was largely due to drying effect of Bipap/HFNC  - early 4/10 (overnight) - recurrence noted, greatly appreciated House staff addressing and packing last night. House staff kindly removed packing 4/11 with afrin soak. Mild nosebleed about 30min later, but stopped with RN holding pressure.  If recurs, may need ENT assistance as she is in need of AC for DVT and is hypercoagulable from malignancy.     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    - Na remains stable; now with mild LE edema but already urinating nearly 3L past 24 hours so will hold on any lasix as will likely improve with discontinuation of TPN 4/9     Steroid induced hyperglycemia  - continue high dose ssi     Urinary incontinence  UA negative 4/2  - continue pure wick      Hypokalemia, resolved     GBM s/p resection 2/23/21  Followed by Dr. Baker of Neuro-Oncology.  She is in process of being set up to start chemotherapy and XRT next week.   Appears to have been on prolonged steroids following  neurosurgery as was discharged without plan for taper and initiated taper only recently in follow up with Dr. Baker.  - now on prednisone as above with plan for prolonged taper  - has not yet initiated chemo (temozolomide) and planned radiation  - daughter reporting she had been working with SLP for language/cognition following her surgery, will order consult     Hypertension  - continue PTA amlodipine     Sinus tachycardia  Developed persistent tachycardia starting 4/8; mostly suspect due to increased activity.  VBG shows good pO2 levels, continuing to wean down on oxygen, no significant pain or anxiety.  - switch albuterol to levalbuterol as above  - prn IV metoprolol  - has been mildly hypertensive, could consider low dose oral metoprolol; monitor for now  - metoprolol 12.5 mg bid.      Asthma  MARCELLE  - nebs as above  - resume CPAP with home setting 4/9     Depression and anxiety  Insomnia  - continue PTA Prozac and Buspar  - continue PTA Atarax for sleep  - prn lorazepam     GERD  - continue PTA Pepcid     Chronic anemia  Baseline hgb 10-11 g/dL, gradually trended slightly down this admission likely due to marrow suppression in acute illness.  - hgb stable 9-10 g/dL on 4/8  - 4/11 7.8 <-- 8.5 (recent epistaxis recurrence noted)  - monitor     Non-severe malnutrition  Noted decrease oral intake and drop in albumin, subcutaneous fat loss on exam.   - PICC line in place  - excellent oral intake, tapered off TPN 4/9       Diet: Snacks/Supplements Adult: Boost Shake; Between Meals  Regular Diet Adult  Snacks/Supplements Adult: Magic Cup; With Meals  Room Service  Advance Diet as Tolerated    DVT Prophylaxis: Enoxaparin (Lovenox) SQ  Martino Catheter: not present  Code Status: Full Code           Disposition Plan   Expected discharge: pending plan tomorrow, daughter has been talking with Dr Baker regarding treatment and possible/rumored facilities patient could discharge to whilst getting treatment. Hoping to avoid another  2 week delay in her GBM treatment.   Entered: Duke Bernabe MD 04/11/2021, 10:41 AM       The patient's care was discussed with the Bedside Nurse, Patient and Patient's Family.    Duke Bernabe MD  Hospitalist Service  [facility]  Contact information available via [facility] Paging/Directory    ______________________________________________________________________    Interval History   Patient seen and examined midday.  No new complaints overall.  She does seem a bit down which is understandable, unfortunately.  Denies fevers, chills or worsening shortness of breath.  No pain reported.  Packing in right nasal passage soaked and removed by house staff, greatly appreciated.  Brief bleed afterwards stopped with RN pressure.  Continues to need 5 L of oxygen support.  Will hopefully get a clear picture of options regarding discharge and hopefully treatment for GBM.  Patient is too weak to be at home even with family support at this point.    Data reviewed today: I reviewed all medications, new labs and imaging results over the last 24 hours. I personally reviewed no images or EKG's today.    Physical Exam   Vital Signs: Temp: 97.1  F (36.2  C) Temp src: Oral BP: 133/87 Pulse: 87   Resp: 18 SpO2: 94 % O2 Device: Nasal cannula with humidification Oxygen Delivery: 5 LPM  Weight: 155 lbs 3.26 oz    Gen: NAD, pleasant, elderly, frail  HEENT: Normocephalic, EOMI, MMM, R nostril packed, no active bleed noted  Resp: no focal crackles,  no wheezes, no increased work of resp  CV: S1S2 heard, reg rhythm, reg rate  Abdo: soft, nontender, nondistended, bowel sounds present  Ext: calves nontender, well perfused  Neuro: AAOx3, CN grossly intact, no facial asymmetry      Data   Recent Labs   Lab 04/11/21  0630 04/10/21  0640 04/09/21  0555 04/08/21  1655 04/08/21  0628 04/05/21  0640 04/05/21  0640   WBC 14.6* 20.1*  --  15.0* 15.7*   < > 15.4*   HGB 7.8* 8.5*  --  8.8* 8.9*   < > 9.1*   MCV 88 89  --  90 88    < > 88    243  --  220 230   < > 214   INR  --   --   --   --   --   --  1.02   NA  --  130* 132*  --  132*   < > 130*   POTASSIUM  --  4.6 4.5  --  4.5   < > 4.7   CHLORIDE  --  97 100  --  99   < > 98   CO2  --  26 28  --  28   < > 26   BUN  --  30 27  --  22   < > 25   CR  --  0.66 0.54  --  0.56   < > 0.50*   ANIONGAP  --  7 4  --  5   < > 6   ESTIVEN  --  8.8 8.7  --  9.0   < > 8.7   GLC  --  80 131*  --  130*   < > 165*   ALBUMIN  --   --   --   --   --   --  2.3*   PROTTOTAL  --   --   --   --   --   --  5.8*   BILITOTAL  --   --   --   --   --   --  0.3   ALKPHOS  --   --   --   --   --   --  90   ALT  --   --   --   --   --   --  28   AST  --   --   --   --   --   --  28    < > = values in this interval not displayed.     No results found for this or any previous visit (from the past 24 hour(s)).

## 2021-04-11 NOTE — PROGRESS NOTES
Brief House Officer Note:  Asked by Dr. Bernabe to remove packing from right nare. Packing was wetted with Afrin and easily removed with dressing intact. Hemostasis maintained.     KAYCE Swann, CNP  Hospitalist Service, House Officer  [facility]     Text Page  Pager: [phone]

## 2021-04-11 NOTE — PROGRESS NOTES
CNP removed packing from right nare. Pt had Epistaxis 30 minutes post removal. Nurse applied Pressure, and utilized cold packs. Pt still had some bleeding. Afrin followed by more pressure and cold packs. Bleeding stopped. No further episodes of bleeding.

## 2021-04-11 NOTE — PLAN OF CARE
Pt Alert and oriented x4. VSS on 5L NC. Pt SOB with activit. CMS intact. Neuros intact. . No coughing observed this shift.. BS+, BM+, flatus+. Tolerating regular diet.. Voiding adequately. Denied having pain. Up assist of 1. Uses bedside commode.

## 2021-04-12 ENCOUNTER — APPOINTMENT (OUTPATIENT)
Dept: OCCUPATIONAL THERAPY | Facility: CLINIC | Age: 76
DRG: 166 | End: 2021-04-12
Attending: NURSE PRACTITIONER
Payer: MEDICARE

## 2021-04-12 ENCOUNTER — APPOINTMENT (OUTPATIENT)
Dept: PHYSICAL THERAPY | Facility: CLINIC | Age: 76
DRG: 166 | End: 2021-04-12
Attending: NURSE PRACTITIONER
Payer: MEDICARE

## 2021-04-12 ENCOUNTER — APPOINTMENT (OUTPATIENT)
Dept: SPEECH THERAPY | Facility: CLINIC | Age: 76
DRG: 166 | End: 2021-04-12
Attending: NURSE PRACTITIONER
Payer: MEDICARE

## 2021-04-12 PROBLEM — Z98.890 S/P CRANIOTOMY: Status: RESOLVED | Noted: 2021-03-01 | Resolved: 2021-04-12

## 2021-04-12 PROBLEM — J96.01 ACUTE RESPIRATORY FAILURE WITH HYPOXIA (H): Status: ACTIVE | Noted: 2021-04-12

## 2021-04-12 LAB
ANION GAP SERPL CALCULATED.3IONS-SCNC: 4 MMOL/L (ref 3–14)
BUN SERPL-MCNC: 24 MG/DL (ref 7–30)
CALCIUM SERPL-MCNC: 8.3 MG/DL (ref 8.5–10.1)
CHLORIDE SERPL-SCNC: 96 MMOL/L (ref 94–109)
CO2 SERPL-SCNC: 28 MMOL/L (ref 20–32)
CREAT SERPL-MCNC: 0.56 MG/DL (ref 0.52–1.04)
ERYTHROCYTE [DISTWIDTH] IN BLOOD BY AUTOMATED COUNT: 15 % (ref 10–15)
GFR SERPL CREATININE-BSD FRML MDRD: >90 ML/MIN/{1.73_M2}
GLUCOSE BLDC GLUCOMTR-MCNC: 100 MG/DL (ref 70–99)
GLUCOSE BLDC GLUCOMTR-MCNC: 131 MG/DL (ref 70–99)
GLUCOSE BLDC GLUCOMTR-MCNC: 157 MG/DL (ref 70–99)
GLUCOSE BLDC GLUCOMTR-MCNC: 80 MG/DL (ref 70–99)
GLUCOSE SERPL-MCNC: 91 MG/DL (ref 70–99)
HCT VFR BLD AUTO: 24.1 % (ref 35–47)
HGB BLD-MCNC: 8 G/DL (ref 11.7–15.7)
LABORATORY COMMENT REPORT: NORMAL
MCH RBC QN AUTO: 29.2 PG (ref 26.5–33)
MCHC RBC AUTO-ENTMCNC: 33.2 G/DL (ref 31.5–36.5)
MCV RBC AUTO: 88 FL (ref 78–100)
PLATELET # BLD AUTO: 178 10E9/L (ref 150–450)
POTASSIUM SERPL-SCNC: 4.2 MMOL/L (ref 3.4–5.3)
RBC # BLD AUTO: 2.74 10E12/L (ref 3.8–5.2)
SARS-COV-2 RNA RESP QL NAA+PROBE: NEGATIVE
SODIUM SERPL-SCNC: 128 MMOL/L (ref 133–144)
SPECIMEN SOURCE: NORMAL
WBC # BLD AUTO: 13.3 10E9/L (ref 4–11)

## 2021-04-12 PROCEDURE — 120N000001 HC R&B MED SURG/OB

## 2021-04-12 PROCEDURE — 97110 THERAPEUTIC EXERCISES: CPT | Mod: GP

## 2021-04-12 PROCEDURE — 99232 SBSQ HOSP IP/OBS MODERATE 35: CPT | Performed by: INTERNAL MEDICINE

## 2021-04-12 PROCEDURE — 250N000012 HC RX MED GY IP 250 OP 636 PS 637: Performed by: HOSPITALIST

## 2021-04-12 PROCEDURE — 250N000013 HC RX MED GY IP 250 OP 250 PS 637: Performed by: HOSPITALIST

## 2021-04-12 PROCEDURE — U0005 INFEC AGEN DETEC AMPLI PROBE: HCPCS | Performed by: INTERNAL MEDICINE

## 2021-04-12 PROCEDURE — 85027 COMPLETE CBC AUTOMATED: CPT | Performed by: HOSPITALIST

## 2021-04-12 PROCEDURE — U0003 INFECTIOUS AGENT DETECTION BY NUCLEIC ACID (DNA OR RNA); SEVERE ACUTE RESPIRATORY SYNDROME CORONAVIRUS 2 (SARS-COV-2) (CORONAVIRUS DISEASE [COVID-19]), AMPLIFIED PROBE TECHNIQUE, MAKING USE OF HIGH THROUGHPUT TECHNOLOGIES AS DESCRIBED BY CMS-2020-01-R: HCPCS | Performed by: INTERNAL MEDICINE

## 2021-04-12 PROCEDURE — 97116 GAIT TRAINING THERAPY: CPT | Mod: GP

## 2021-04-12 PROCEDURE — 97530 THERAPEUTIC ACTIVITIES: CPT | Mod: GP

## 2021-04-12 PROCEDURE — 250N000011 HC RX IP 250 OP 636: Performed by: HOSPITALIST

## 2021-04-12 PROCEDURE — 999N000190 HC STATISTIC VAT ROUNDS

## 2021-04-12 PROCEDURE — 258N000003 HC RX IP 258 OP 636: Performed by: HOSPITALIST

## 2021-04-12 PROCEDURE — 999N000157 HC STATISTIC RCP TIME EA 10 MIN

## 2021-04-12 PROCEDURE — 94640 AIRWAY INHALATION TREATMENT: CPT | Mod: 76

## 2021-04-12 PROCEDURE — 92507 TX SP LANG VOICE COMM INDIV: CPT | Mod: GN | Performed by: SPEECH-LANGUAGE PATHOLOGIST

## 2021-04-12 PROCEDURE — 250N000013 HC RX MED GY IP 250 OP 250 PS 637: Performed by: INTERNAL MEDICINE

## 2021-04-12 PROCEDURE — 250N000009 HC RX 250: Performed by: HOSPITALIST

## 2021-04-12 PROCEDURE — 97110 THERAPEUTIC EXERCISES: CPT | Mod: GO | Performed by: OCCUPATIONAL THERAPIST

## 2021-04-12 PROCEDURE — 250N000011 HC RX IP 250 OP 636: Performed by: INTERNAL MEDICINE

## 2021-04-12 PROCEDURE — 94640 AIRWAY INHALATION TREATMENT: CPT

## 2021-04-12 PROCEDURE — 999N001017 HC STATISTIC GLUCOSE BY METER IP

## 2021-04-12 PROCEDURE — 80048 BASIC METABOLIC PNL TOTAL CA: CPT | Performed by: HOSPITALIST

## 2021-04-12 RX ORDER — PREDNISONE 20 MG/1
40 TABLET ORAL DAILY
Status: DISCONTINUED | OUTPATIENT
Start: 2021-04-13 | End: 2021-04-14

## 2021-04-12 RX ORDER — PREDNISONE 5 MG/1
10 TABLET ORAL DAILY
Status: DISCONTINUED | OUTPATIENT
Start: 2021-04-21 | End: 2021-04-14

## 2021-04-12 RX ORDER — SULFAMETHOXAZOLE/TRIMETHOPRIM 800-160 MG
2 TABLET ORAL 3 TIMES DAILY
Status: DISCONTINUED | OUTPATIENT
Start: 2021-04-12 | End: 2021-04-14

## 2021-04-12 RX ORDER — PREDNISONE 20 MG/1
20 TABLET ORAL DAILY
Status: DISCONTINUED | OUTPATIENT
Start: 2021-04-18 | End: 2021-04-14

## 2021-04-12 RX ADMIN — NYSTATIN 500000 UNITS: 100000 SUSPENSION ORAL at 12:09

## 2021-04-12 RX ADMIN — SULFAMETHOXAZOLE AND TRIMETHOPRIM 320 MG: 80; 16 INJECTION, SOLUTION, CONCENTRATE INTRAVENOUS at 09:09

## 2021-04-12 RX ADMIN — NYSTATIN 500000 UNITS: 100000 SUSPENSION ORAL at 21:39

## 2021-04-12 RX ADMIN — NYSTATIN 500000 UNITS: 100000 SUSPENSION ORAL at 09:05

## 2021-04-12 RX ADMIN — BUSPIRONE HYDROCHLORIDE 30 MG: 15 TABLET ORAL at 09:06

## 2021-04-12 RX ADMIN — FAMOTIDINE 20 MG: 20 TABLET ORAL at 20:00

## 2021-04-12 RX ADMIN — ENOXAPARIN SODIUM 70 MG: 80 INJECTION SUBCUTANEOUS at 21:38

## 2021-04-12 RX ADMIN — SULFAMETHOXAZOLE AND TRIMETHOPRIM 2 TABLET: 800; 160 TABLET ORAL at 16:13

## 2021-04-12 RX ADMIN — NYSTATIN 500000 UNITS: 100000 SUSPENSION ORAL at 18:38

## 2021-04-12 RX ADMIN — Medication 12.5 MG: at 09:06

## 2021-04-12 RX ADMIN — SULFAMETHOXAZOLE AND TRIMETHOPRIM 2 TABLET: 800; 160 TABLET ORAL at 21:39

## 2021-04-12 RX ADMIN — AMLODIPINE BESYLATE 10 MG: 10 TABLET ORAL at 09:06

## 2021-04-12 RX ADMIN — PREDNISONE 40 MG: 20 TABLET ORAL at 09:06

## 2021-04-12 RX ADMIN — HYDROXYZINE HYDROCHLORIDE 50 MG: 25 TABLET, FILM COATED ORAL at 21:43

## 2021-04-12 RX ADMIN — BUSPIRONE HYDROCHLORIDE 30 MG: 15 TABLET ORAL at 20:00

## 2021-04-12 RX ADMIN — FAMOTIDINE 20 MG: 20 TABLET ORAL at 09:06

## 2021-04-12 RX ADMIN — LEVALBUTEROL HYDROCHLORIDE 1.25 MG: 1.25 SOLUTION, CONCENTRATE RESPIRATORY (INHALATION) at 15:24

## 2021-04-12 RX ADMIN — Medication 12.5 MG: at 20:00

## 2021-04-12 RX ADMIN — ENOXAPARIN SODIUM 70 MG: 80 INJECTION SUBCUTANEOUS at 09:08

## 2021-04-12 RX ADMIN — LEVALBUTEROL HYDROCHLORIDE 1.25 MG: 1.25 SOLUTION, CONCENTRATE RESPIRATORY (INHALATION) at 11:49

## 2021-04-12 RX ADMIN — FLUOXETINE 80 MG: 20 CAPSULE ORAL at 09:05

## 2021-04-12 RX ADMIN — LEVALBUTEROL HYDROCHLORIDE 1.25 MG: 1.25 SOLUTION, CONCENTRATE RESPIRATORY (INHALATION) at 19:21

## 2021-04-12 ASSESSMENT — ACTIVITIES OF DAILY LIVING (ADL)
ADLS_ACUITY_SCORE: 22

## 2021-04-12 NOTE — PROGRESS NOTES
"[facility]    Infectious Disease Progress Note    Date of Service (when I saw the patient): 04/12/2021     Assessment & Plan   Olga Bailey is a 75 year old female who was admitted on 3/26/2021.     Impression:  1. 75 y.o with recent diagnosis of craniotomy and resection of glioblastoma multiforme.   2. Has been on very high dose steroid taper for the past month since the craniotomy. Not on any bactrim prophylaxis before admission.    3. Admitted with shortness of breath.   4. Found to have bilateral ground glass infiltrates on the imaging, very hypoxic.   5. COVID PCR negative x 2.   6. No leucocytosis, procal not elevated.      Recommendations:   High suspicion for PJP pneumonia. LDH high, high beta d glucan unable to confirm with the sputum studies as patient has been unable to provide any sputum   Steroids for PJP continue to taper to be off steroids in next 7- 10 days     Day 14/21 septra slow improvement to be expected  but now much better   SWITCH TO PO TODAY continue as we taper off steroids.     Discussed with pharmacy, Dr. Tian, daughter bedside.             Per neuro onc last note her cancer related plan was as listed in the note below, I would recommend follow up with Neuro onc after discharge.    I am copying and pasting Neuro onc last note below:   \" PLAN  -CANCER-DIRECTED THERAPY-  Will initiate chemoradiotherapy with temozolomide 75mg/m2 (140mg).   -Instructed to start temozolomide the evening before the start date of radiation and continue daily until the completion of radiation.   -Take temozolomide on an empty stomach, 30 minutes after Zofran dosing.  -Additional temozolomide teaching performed by RN/ pharmacy staff.      -Initial labs ordered; CBC with differential, CMP, Hepatitis B serologies; NR.  -Will continue weekly CBC and repeat CMP at follow-up.     -Medications prescribed;        -Zofran 4mg (1 to 2 tabs qHS 30 minutes prior to chemotherapy and then PRN " "nausea).       -For lymphopenia, prophylactic antibiotics are recommended during concurrent treatment. Will start Sulfamethoxazole-trimethoprim (Bactrim) 800 mg-160 mg; 1 tab orally Monday, Wednesday, and Friday for pneumocystis prophylaxis. If thrombocytopenia becomes an issue, can change to Dapsone, Atovaquone, or Pentamidine.        -Docusate 100 mg; 1 cap orally 3 times a day (stool softener for constipation)       -Senna 8.6 m tabs orally at bedtime (laxative as needed for constipation)     -STEROIDS-  -Continue to wean dexamethasone as tolerated;                           4mg daily ( - 3/28)                          2mg daily (3/29 - )                          2mg on , , , then stop.   -Dose may increase while undergoing radiation.\"          Robyn Branham MD    Interval History   O2 much better  No fever   No new micro   No new complaints       Physical Exam   Temp: 98.1  F (36.7  C) Temp src: Oral BP: 127/78 Pulse: 81   Resp: 16 SpO2: 96 % O2 Device: Oxymask(with humidification) Oxygen Delivery: 2 LPM  Vitals:    21 0400 21 0600 04/10/21 0600   Weight: 70.2 kg (154 lb 12.2 oz) 65.5 kg (144 lb 6.4 oz) 70.4 kg (155 lb 3.3 oz)     Vital Signs with Ranges  Temp:  [97.6  F (36.4  C)-99.9  F (37.7  C)] 98.1  F (36.7  C)  Pulse:  [72-92] 81  Resp:  [16-18] 16  BP: (113-145)/(56-90) 127/78  SpO2:  [94 %-99 %] 96 %    Constitutional: Awake, alert  Lungs: diminished bilateral   Cardiovascular: Regular rate and rhythm, normal S1 and S2, and no murmur noted  Abdomen: Normal bowel sounds, soft, non-distended, non-tender  Skin: No rashes, no cyanosis, no edema  Other:    Medications     dextrose 250 mL (21 0643)       amLODIPine  10 mg Oral Daily     busPIRone HCl  30 mg Oral BID     enoxaparin ANTICOAGULANT  70 mg Subcutaneous Q12H     famotidine  20 mg Oral BID     FLUoxetine  80 mg Oral Daily     insulin aspart  1-10 Units Subcutaneous TID AC     insulin aspart  1-7 Units " Subcutaneous At Bedtime     levalbuterol  1.25 mg Nebulization Q4H While awake     metoprolol tartrate  12.5 mg Oral BID     nystatin  500,000 Units Swish & Spit 4x Daily     predniSONE  40 mg Oral BID w/meals     sodium chloride (PF)  3 mL Intracatheter Q8H     sulfamethoxazole-trimethoprim  320 mg Intravenous Q8H       Data   All microbiology laboratory data reviewed.  Recent Labs   Lab Test 04/12/21  0630 04/11/21  0630 04/10/21  0640   WBC 13.3* 14.6* 20.1*   HGB 8.0* 7.8* 8.5*   HCT 24.1* 23.7* 26.3*   MCV 88 88 89    186 243     Recent Labs   Lab Test 04/12/21  0630 04/10/21  0640 04/09/21  0555   CR 0.56 0.66 0.54     No lab results found.  Recent Labs   Lab Test 03/29/21 1951 03/29/21 1946   CULT No growth No growth       Attestation:  Total time on the floor involved in the patient's care: 35 minutes. Total time spent in counseling/care coordination: >50%

## 2021-04-12 NOTE — PROGRESS NOTES
"PULMONOLOGY PROGRESS NOTE    Date of Admission: 3/26/2021    CC/Reason for Hospital visit: Acute hypoxic respiratory failure, suspected PJP  SUBJECTIVE      Events reviewed since last seen. Remains off BiPAP. Stable night. On room air, sitting up in chair. Hoping to discharge soon.    ROS: A Problem Pertinent review of systems was negative except for items noted in HPI.  Past Medical, Family, and Social/Substance History has been reviewed: No interval changes.    OBJECTIVE   Vital signs:  Vital signs:  Temp: 98.1  F (36.7  C) Temp src: Oral BP: 127/78 Pulse: 81   Resp: 16 SpO2: 96 % O2 Device: Oxymask(with humidification) Oxygen Delivery: 2 LPM Height: 160 cm (5' 3\") Weight: 70.4 kg (155 lb 3.3 oz)  Estimated body mass index is 27.49 kg/m  as calculated from the following:    Height as of this encounter: 1.6 m (5' 3\").    Weight as of this encounter: 70.4 kg (155 lb 3.3 oz).      CONSTITUTIONAL/GENERAL APPEARANCE: Alert female. No Apparent Distress.  PSYCHIATRIC: Pleasant and appropriate mood and affect. Oriented x 3.  EARS, NOSE,THROAT,MOUTH: External ears and nose overall normal. Normal oral mucosa.   NECK: Neck appearance normal. No neck masses and the thyroid is not enlarged.   RESPIRATORY: Non-labored effort. Decreased BS anteriorly.  CARDIOVASCULAR: S1, S2, regular rate and rhythm.    LABORATORY ASSESSMENT    Arterial Blood Gas  Recent Labs   Lab 04/04/21  0800 04/02/21  0615 03/31/21  1440 03/29/21  2235   PH 7.45 7.49* 7.49* 7.51*   PCO2 40 35 34* 35   PO2 74* 83 61* 67*   HCO3 28 26 25 28   O2PER 40% 50% 60 50     CBC  Recent Labs   Lab 04/04/21  0811 04/03/21  0527 04/02/21  0550 04/01/21  0500   WBC 12.7* 12.1* 10.9 10.5   RBC 3.52* 3.41* 3.46* 3.46*   HGB 10.2* 9.8* 9.9* 9.8*   HCT 31.2* 30.2* 30.4* 30.4*   MCV 89 89 88 88   MCH 29.0 28.7 28.6 28.3   MCHC 32.7 32.5 32.6 32.2   RDW 14.4 14.6 14.7 14.6    205 206 185     BMP  Recent Labs   Lab 04/04/21  0811 04/04/21  0518 04/03/21  0527 " 04/02/21  0550   * 131* 130* 131*   POTASSIUM 4.5 4.6 4.7 4.4   CHLORIDE 99 98 98 98   ESTIVEN 8.8 8.5 8.8 8.7   CO2 28 27 28 25   BUN 22 22 26 20   CR 0.51* 0.55 0.51* 0.57   * 183* 163* 181*     INR  Recent Labs   Lab 04/02/21  0550   INR 1.00      BNPNo lab results found in last 7 days.  VENOUS BLOOD GASESNo lab results found in last 7 days.      Additional labs and/or comments:    IMAGING      CXR 4/5 -  IMPRESSION: Mixed interstitial and airspace disease probably similar  to previous given slight differences in technique. PICC line stable.    CXR 4/2 -  IMPRESSION: Right PICC tip at the superior cavoatrial junction. There are persistent patchy infiltrates of both lungs consistent with infectious/inflammatory pneumonitis. No significant pleural effusion. No pneumothorax. Stable cardiac silhouette.    PFT & OTHER TESTING       ASSESSMENT / PLAN      Pulmonary diagnoses:  Abnl CT/CXR R91.8  Asthma unspec J45.909  Hypoxemia R09.02  Resp fail acute J96.00    Additional COVID-19 diagnoses:  Concern of possible exposure to COVID-19, Now RULED OUT Z03.818    ASSESSMENT: 75-year-old female with a history of asthma and obstructive sleep apnea on CPAP, recent diagnosis of glioblastoma status post resection 2/23/21 admitted with progressive dyspnea. Admission CT Chest showed bilateral patchy groundglass opacities, negative for PE.  Initially treated with ceftriaxone and doxy. COVID19 negative 3/26 and 3/29.  Increased oxygen needs 3/29, ultimately requiring BiPAP, with repeat CXR showing increase in bilateral patchy opacities.  Found to have DVT 3/29.  ID consulted 3/29, high suspicion for PJP pneumonia (LDH high, high beta d glucan), unable to confirm with the sputum studies as patient has been unable to provide any sputum.  PJP suspected secondary to prolonged steroid course following neurosurgery, switched from ceftriaxone to zosyn 3/29 and initiated on high dose Bactrim and increased steroids.  Follow-up CXRs  4/2 and 4/5 show persistent patchy infiltrates bilaterally. Patient no longer requiring BiPAP. Seems to be improving, now on room air.    PLAN:  1. On room air. Goal SaO2 ~89-90%90%  2. Bronchodilators - Xopenex nebs.  3. Antibiotics - Bactrim. Duration per ID  4. Steroids - Prednisone. Duration per ID and Neurosurgery.  5. Anticoagulation - Lovenox.  6. Increase activity and IS as tolerated.  7. Perhaps able to discharge soon?  8. No further suggestions. Will sign off. Please call if needed.      Sushil Ortiz  [facility]  Office: [phone]  Pager: [phone]

## 2021-04-12 NOTE — PLAN OF CARE
0163-3954: A/O x4. VSS on 2L via NC/Oxymask. Assist of 1x. Tolerating regular diet. Lung sounds dim, clear. Bowel sounds active. Passing flatus. BM today per pt. Adequate urine output via purewick, incontinent at times. Skin intact but general ecchymosis. Sores to lips. Denies pain. Denies nausea. Tele: SR. B. Monitoring Hgb's. CTM.

## 2021-04-12 NOTE — TELEPHONE ENCOUNTER
Estefania, inpatient RNCC returned call [phone].    Writer updated Estefania that plan will likely be 3 week course of chemotherapy/RT rather than originally planned 6 week course. Estefania will update SW team and start working on discharge plan.     Louise Jerry, BSN, RN, PHN, OCN  Oncology Care Coordinator  [facility]

## 2021-04-12 NOTE — TELEPHONE ENCOUNTER
Writer contacted [facility] for an update. Per [facility], inpatient SW is still working on discharge. She was last told there are no openings in TCUs able to accept patient while receiving oral chemo/RT.     Attempted to reach CAMRON:  DAYNA Brown  Daytime (8:00am-4:30pm): [phone]  No answer. Contacted [facility] on [address] who reports [facility] is in a meeting. Requested call back in 30 min.

## 2021-04-12 NOTE — PROGRESS NOTES
[facility]    Medicine Progress Note - Hospitalist Service       Date of Admission:  3/26/2021  Assessment & Plan           Olga Bailey is a 75 year old female admitted on 3/26/2021.  Past medical history that is most notable for recent craniotomy and resection of glioblastoma multiforme, as well as uncomplicated asthma, who presents with dyspnea and is found to have acute bilateral pneumonia.     Acute hypoxic respiratory failure due to bilateral pneumonia, suspected PCP   -- discussed with ID, all consistent with PCP   -- Bactrim DS 2 tid for 1 more week   -- start weaning Prednisone      Leukocytosis   -- infection, and steroid affect, slowly improving    Bilateral LE DVT   -- US 3/29 occlusive DVT of bilateral posterior tibial veins and soleal veins    -- continue on therapeutic lovenox      Epistaxis   -- Has experienced mild intermittent epistaxis while on anticoagulation.  - conservative measures: humidified air, cold packs, pressure  - use Afrin prn to effected nostril when above measures ineffective  - no further bleeding overnight; suspect this was largely due to drying effect of Bipap/HFNC  - early 4/10 (overnight) - recurrence noted, greatly appreciated House staff addressing and packing last night. House staff kindly removed packing 4/11 with afrin soak. Mild nosebleed about 30min later, but stopped with RN holding pressure.  If recurs, may need ENT assistance as she is in need of AC for DVT and is hypercoagulable from malignancy.     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    - Na remains stable; now with mild LE edema but already urinating nearly 3L past 24 hours so will hold on any lasix as will likely improve with discontinuation of TPN 4/9     Steroid induced hyperglycemia  - continue high dose ssi     Urinary incontinence  UA negative 4/2  - continue pure wick      Hypokalemia, resolved     GBM s/p resection 2/23/21  Followed  by Dr. Baker of Neuro-Oncology.  She is in process of being set up to start chemotherapy and XRT next week.   Appears to have been on prolonged steroids following neurosurgery as was discharged without plan for taper and initiated taper only recently in follow up with Dr. Baker.  - now on prednisone as above with plan for prolonged taper  - has not yet initiated chemo (temozolomide) and planned radiation  - daughter reporting she had been working with SLP for language/cognition following her surgery, will order consult     Hypertension  - continue PTA amlodipine     Sinus tachycardia  Developed persistent tachycardia starting 4/8; mostly suspect due to increased activity.  VBG shows good pO2 levels, continuing to wean down on oxygen, no significant pain or anxiety.  - switch albuterol to levalbuterol as above  - prn IV metoprolol  - has been mildly hypertensive, could consider low dose oral metoprolol; monitor for now  - metoprolol 12.5 mg bid.      Asthma  MARCELLE  - nebs as above  - resume CPAP with home setting 4/9     Depression and anxiety  Insomnia  - continue PTA Prozac and Buspar  - continue PTA Atarax for sleep  - prn lorazepam     GERD  - continue PTA Pepcid     Chronic anemia  Baseline hgb 10-11 g/dL, gradually trended slightly down this admission likely due to marrow suppression in acute illness.  - hgb stable 9-10 g/dL on 4/8, now 8 secondary to blood loss      Non-severe malnutrition  Noted decrease oral intake and drop in albumin, subcutaneous fat loss on exam.   - PICC line in place  - excellent oral intake, tapered off TPN 4/9       Diet: Snacks/Supplements Adult: Boost Shake; Between Meals  Regular Diet Adult  Snacks/Supplements Adult: Magic Cup; With Meals  Room Service  Advance Diet as Tolerated    DVT Prophylaxis: Enoxaparin (Lovenox) SQ  Martino Catheter: not present  Code Status: Full Code           Disposition Plan   Expected discharge: pending plan ?in 2 days, ?home with daughter, will discuss with  Dr Baker regarding treatment and possible/rumored facilities patient could discharge to whilst getting treatment. Hoping to avoid another 2 week delay in her GBM treatment -- some family members wanting 2nd opinion at [place] -- will discuss with Dr. Baker with Oncology.      Bonilla Tian MD  Hospitalist Service  [facility]  Contact information available via [facility] Paging/Directory    ______________________________________________________________________    Interval History   Still on O2, feels OK, slowly getting stronger.     Physical Exam   Vital Signs: Temp: 98.1  F (36.7  C) Temp src: Oral BP: 127/78 Pulse: 81   Resp: 16 SpO2: 96 % O2 Device: Oxymask(with humidification) Oxygen Delivery: 2 LPM  Weight: 155 lbs 3.26 oz    Gen: NAD, pleasant, elderly, frail  Resp: no focal crackles,  no wheezes, no increased work of resp  CV: S1S2 heard, reg rhythm, reg rate  Abdo: soft, nontender, nondistended, bowel sounds present  Ext: calves nontender, well perfused  Neuro: AAOx3, CN grossly intact, no facial asymmetry      Data   Recent Labs   Lab 04/12/21  0630 04/11/21  0630 04/10/21  0640 04/09/21  0555   WBC 13.3* 14.6* 20.1*  --    HGB 8.0* 7.8* 8.5*  --    MCV 88 88 89  --     186 243  --    *  --  130* 132*   POTASSIUM 4.2  --  4.6 4.5   CHLORIDE 96  --  97 100   CO2 28  --  26 28   BUN 24  --  30 27   CR 0.56  --  0.66 0.54   ANIONGAP 4  --  7 4   ESTIVEN 8.3*  --  8.8 8.7   GLC 91  --  80 131*     No results found for this or any previous visit (from the past 24 hour(s)).

## 2021-04-12 NOTE — PLAN OF CARE
Pt Alert and oriented x4. Oxymask at 2L and tolerating it well. Pt SOB with activity. CMS intact. Neuro intact. . No coughing observed this shift.. BS+, BM+, flatus+. On regular diet. On Purewick and has good output . Denied having pain. Up assist of 1. No nose bleeding this shift.  To continue to monitor.

## 2021-04-12 NOTE — PLAN OF CARE
A&Ox4. VSS on 2L Oxymask. Tele:SR. Denies pain. LS diminished. Incont at times due to urgency- purewick in place. Ecchymotic skin, lip sores. Tolerating regular diet. Ax1 Gb/walker. Switched from IV to PO bactrim today per ID. Hgb 8, BG 80, 152. Plan to discharge to ARU when respiratory status improves. Pulmonology signed off today.

## 2021-04-13 ENCOUNTER — APPOINTMENT (OUTPATIENT)
Dept: OCCUPATIONAL THERAPY | Facility: CLINIC | Age: 76
DRG: 166 | End: 2021-04-13
Attending: NURSE PRACTITIONER
Payer: MEDICARE

## 2021-04-13 ENCOUNTER — APPOINTMENT (OUTPATIENT)
Dept: PHYSICAL THERAPY | Facility: CLINIC | Age: 76
DRG: 166 | End: 2021-04-13
Attending: NURSE PRACTITIONER
Payer: MEDICARE

## 2021-04-13 ENCOUNTER — APPOINTMENT (OUTPATIENT)
Dept: SPEECH THERAPY | Facility: CLINIC | Age: 76
DRG: 166 | End: 2021-04-13
Attending: NURSE PRACTITIONER
Payer: MEDICARE

## 2021-04-13 LAB
ANION GAP SERPL CALCULATED.3IONS-SCNC: 4 MMOL/L (ref 3–14)
BUN SERPL-MCNC: 27 MG/DL (ref 7–30)
CALCIUM SERPL-MCNC: 8.4 MG/DL (ref 8.5–10.1)
CHLORIDE SERPL-SCNC: 97 MMOL/L (ref 94–109)
CO2 SERPL-SCNC: 29 MMOL/L (ref 20–32)
CREAT SERPL-MCNC: 0.64 MG/DL (ref 0.52–1.04)
ERYTHROCYTE [DISTWIDTH] IN BLOOD BY AUTOMATED COUNT: 15.5 % (ref 10–15)
GFR SERPL CREATININE-BSD FRML MDRD: 87 ML/MIN/{1.73_M2}
GLUCOSE BLDC GLUCOMTR-MCNC: 108 MG/DL (ref 70–99)
GLUCOSE BLDC GLUCOMTR-MCNC: 130 MG/DL (ref 70–99)
GLUCOSE BLDC GLUCOMTR-MCNC: 158 MG/DL (ref 70–99)
GLUCOSE BLDC GLUCOMTR-MCNC: 80 MG/DL (ref 70–99)
GLUCOSE SERPL-MCNC: 74 MG/DL (ref 70–99)
HCT VFR BLD AUTO: 25.8 % (ref 35–47)
HGB BLD-MCNC: 8.2 G/DL (ref 11.7–15.7)
INTERPRETATION ECG - MUSE: NORMAL
MCH RBC QN AUTO: 28 PG (ref 26.5–33)
MCHC RBC AUTO-ENTMCNC: 31.8 G/DL (ref 31.5–36.5)
MCV RBC AUTO: 88 FL (ref 78–100)
PLATELET # BLD AUTO: 175 10E9/L (ref 150–450)
POTASSIUM SERPL-SCNC: 4.3 MMOL/L (ref 3.4–5.3)
RBC # BLD AUTO: 2.93 10E12/L (ref 3.8–5.2)
SODIUM SERPL-SCNC: 130 MMOL/L (ref 133–144)
WBC # BLD AUTO: 11.7 10E9/L (ref 4–11)

## 2021-04-13 PROCEDURE — 92507 TX SP LANG VOICE COMM INDIV: CPT | Mod: GN | Performed by: SPEECH-LANGUAGE PATHOLOGIST

## 2021-04-13 PROCEDURE — 250N000013 HC RX MED GY IP 250 OP 250 PS 637: Performed by: INTERNAL MEDICINE

## 2021-04-13 PROCEDURE — 250N000013 HC RX MED GY IP 250 OP 250 PS 637: Performed by: HOSPITALIST

## 2021-04-13 PROCEDURE — 999N001017 HC STATISTIC GLUCOSE BY METER IP

## 2021-04-13 PROCEDURE — 85027 COMPLETE CBC AUTOMATED: CPT | Performed by: INTERNAL MEDICINE

## 2021-04-13 PROCEDURE — 120N000001 HC R&B MED SURG/OB

## 2021-04-13 PROCEDURE — 97116 GAIT TRAINING THERAPY: CPT | Mod: GP

## 2021-04-13 PROCEDURE — 94640 AIRWAY INHALATION TREATMENT: CPT | Mod: 76

## 2021-04-13 PROCEDURE — 80048 BASIC METABOLIC PNL TOTAL CA: CPT | Performed by: INTERNAL MEDICINE

## 2021-04-13 PROCEDURE — 999N000190 HC STATISTIC VAT ROUNDS

## 2021-04-13 PROCEDURE — 94640 AIRWAY INHALATION TREATMENT: CPT

## 2021-04-13 PROCEDURE — 250N000009 HC RX 250: Performed by: HOSPITALIST

## 2021-04-13 PROCEDURE — 250N000012 HC RX MED GY IP 250 OP 636 PS 637: Performed by: INTERNAL MEDICINE

## 2021-04-13 PROCEDURE — 250N000011 HC RX IP 250 OP 636: Performed by: INTERNAL MEDICINE

## 2021-04-13 PROCEDURE — 99232 SBSQ HOSP IP/OBS MODERATE 35: CPT | Performed by: INTERNAL MEDICINE

## 2021-04-13 PROCEDURE — 94664 DEMO&/EVAL PT USE INHALER: CPT

## 2021-04-13 PROCEDURE — 97535 SELF CARE MNGMENT TRAINING: CPT | Mod: GO | Performed by: OCCUPATIONAL THERAPIST

## 2021-04-13 PROCEDURE — 97530 THERAPEUTIC ACTIVITIES: CPT | Mod: GP

## 2021-04-13 PROCEDURE — 97530 THERAPEUTIC ACTIVITIES: CPT | Mod: GO | Performed by: OCCUPATIONAL THERAPIST

## 2021-04-13 RX ADMIN — SULFAMETHOXAZOLE AND TRIMETHOPRIM 2 TABLET: 800; 160 TABLET ORAL at 08:04

## 2021-04-13 RX ADMIN — ENOXAPARIN SODIUM 70 MG: 80 INJECTION SUBCUTANEOUS at 21:03

## 2021-04-13 RX ADMIN — FAMOTIDINE 20 MG: 20 TABLET ORAL at 08:04

## 2021-04-13 RX ADMIN — NYSTATIN 500000 UNITS: 100000 SUSPENSION ORAL at 08:07

## 2021-04-13 RX ADMIN — BUSPIRONE HYDROCHLORIDE 30 MG: 15 TABLET ORAL at 08:04

## 2021-04-13 RX ADMIN — LEVALBUTEROL HYDROCHLORIDE 1.25 MG: 1.25 SOLUTION, CONCENTRATE RESPIRATORY (INHALATION) at 16:19

## 2021-04-13 RX ADMIN — FLUOXETINE 80 MG: 20 CAPSULE ORAL at 08:01

## 2021-04-13 RX ADMIN — LEVALBUTEROL HYDROCHLORIDE 1.25 MG: 1.25 SOLUTION, CONCENTRATE RESPIRATORY (INHALATION) at 10:33

## 2021-04-13 RX ADMIN — SULFAMETHOXAZOLE AND TRIMETHOPRIM 2 TABLET: 800; 160 TABLET ORAL at 21:03

## 2021-04-13 RX ADMIN — AMLODIPINE BESYLATE 10 MG: 10 TABLET ORAL at 08:02

## 2021-04-13 RX ADMIN — NYSTATIN 500000 UNITS: 100000 SUSPENSION ORAL at 18:14

## 2021-04-13 RX ADMIN — PREDNISONE 40 MG: 20 TABLET ORAL at 08:03

## 2021-04-13 RX ADMIN — FAMOTIDINE 20 MG: 20 TABLET ORAL at 21:03

## 2021-04-13 RX ADMIN — SULFAMETHOXAZOLE AND TRIMETHOPRIM 2 TABLET: 800; 160 TABLET ORAL at 15:31

## 2021-04-13 RX ADMIN — Medication 12.5 MG: at 08:04

## 2021-04-13 RX ADMIN — LEVALBUTEROL HYDROCHLORIDE 1.25 MG: 1.25 SOLUTION, CONCENTRATE RESPIRATORY (INHALATION) at 07:15

## 2021-04-13 RX ADMIN — BUSPIRONE HYDROCHLORIDE 30 MG: 15 TABLET ORAL at 21:03

## 2021-04-13 RX ADMIN — ENOXAPARIN SODIUM 70 MG: 80 INJECTION SUBCUTANEOUS at 10:14

## 2021-04-13 RX ADMIN — NYSTATIN 500000 UNITS: 100000 SUSPENSION ORAL at 21:03

## 2021-04-13 RX ADMIN — Medication 12.5 MG: at 21:03

## 2021-04-13 RX ADMIN — LEVALBUTEROL HYDROCHLORIDE 1.25 MG: 1.25 SOLUTION, CONCENTRATE RESPIRATORY (INHALATION) at 19:21

## 2021-04-13 RX ADMIN — NYSTATIN 500000 UNITS: 100000 SUSPENSION ORAL at 13:06

## 2021-04-13 RX ADMIN — HYDROXYZINE HYDROCHLORIDE 50 MG: 25 TABLET, FILM COATED ORAL at 21:03

## 2021-04-13 ASSESSMENT — ACTIVITIES OF DAILY LIVING (ADL)
ADLS_ACUITY_SCORE: 22
ADLS_ACUITY_SCORE: 18

## 2021-04-13 ASSESSMENT — MIFFLIN-ST. JEOR: SCORE: 1153.13

## 2021-04-13 NOTE — PROGRESS NOTES
[facility]    Medicine Progress Note - Hospitalist Service       Date of Admission:  3/26/2021  Assessment & Plan       Olga Bailey is a 75 year old female admitted on 3/26/2021.  Past medical history that is most notable for recent craniotomy and resection of glioblastoma multiforme, as well as uncomplicated asthma, who presents with dyspnea and is found to have acute bilateral pneumonia.     Acute hypoxic respiratory failure due to bilateral pneumonia, suspected PCP   -- discussed with ID, all consistent with PCP   -- Bactrim DS 2 tid for 1 more week   -- start weaning Prednisone      Leukocytosis   -- infection, and steroid affect, slowly improving    Bilateral LE DVT   -- US 3/29 occlusive DVT of bilateral posterior tibial veins and soleal veins    -- continue on therapeutic lovenox      Epistaxis   -- Has experienced mild intermittent epistaxis while on anticoagulation.  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    - Na remains stable; now with mild LE edema but already urinating nearly 3L past 24 hours so will hold on any lasix as will likely improve with discontinuation of TPN 4/9     Steroid induced hyperglycemia  - continue high dose ssi     Urinary incontinence  UA negative 4/2  - continue pure wick      Hypokalemia, resolved     GBM s/p resection 2/23/21  Followed by Dr. Baker of Neuro-Oncology.  She is in process of being set up to start chemotherapy and XRT next week.   Appears to have been on prolonged steroids following neurosurgery as was discharged without plan for taper and initiated taper only recently in follow up with Dr. Baker.  - now on prednisone as above with plan for prolonged taper  - has not yet initiated chemo (temozolomide) and planned radiation  - daughter reporting she had been working  with SLP for language/cognition following her surgery, will order consult     Hypertension  - continue PTA amlodipine     Sinus tachycardia  Developed persistent tachycardia starting 4/8; mostly suspect due to increased activity.  VBG shows good pO2 levels, continuing to wean down on oxygen, no significant pain or anxiety.  - switch albuterol to levalbuterol as above  - prn IV metoprolol  - has been mildly hypertensive, could consider low dose oral metoprolol; monitor for now  - metoprolol 12.5 mg bid.      Asthma  MARCELLE  - nebs as above  - resume CPAP with home setting 4/9     Depression and anxiety  Insomnia  - continue PTA Prozac and Buspar  - continue PTA Atarax for sleep  - prn lorazepam     GERD  - continue PTA Pepcid     Chronic anemia  Baseline hgb 10-11 g/dL, gradually trended slightly down this admission likely due to marrow suppression in acute illness.  - hgb stable 9-10 g/dL on 4/8, now 8 secondary to blood loss      Non-severe malnutrition  Noted decrease oral intake and drop in albumin, subcutaneous fat loss on exam.   - PICC line in place  - excellent oral intake, tapered off TPN 4/9       Diet: Snacks/Supplements Adult: Boost Shake; Between Meals  Regular Diet Adult  Snacks/Supplements Adult: Magic Cup; With Meals  Room Service  Advance Diet as Tolerated    DVT Prophylaxis: Enoxaparin (Lovenox) SQ  Martino Catheter: not present  Code Status: Full Code           Disposition Plan   Expected discharge: pending plan ?in 2 days, ?home with daughter,l discuss with Dr. Baker and she would like to start Chemo Radiation in a week, suspect [facility] will offer same treatment but family can get 2nd opinion online from [facility].      Bonilla Tian MD  Hospitalist Service  [facility]  Contact information available via [facility] Paging/Directory    ______________________________________________________________________    Interval History   Still on O2, feels OK, slowly getting stronger.      Physical Exam   Vital Signs: Temp: 97.2  F (36.2  C) Temp src: Oral BP: 122/77 Pulse: 92   Resp: 18 SpO2: 98 % O2 Device: (S) None (Room air) Oxygen Delivery: 2 LPM  Weight: 151 lbs 14.35 oz    Gen: NAD, pleasant, elderly, frail  Resp: no focal crackles,  no wheezes, no increased work of resp  CV: S1S2 heard, reg rhythm, reg rate  Abdo: soft, nontender, nondistended, bowel sounds present  Ext: calves nontender, well perfused  Neuro: AAOx3, CN grossly intact, no facial asymmetry      Data   Recent Labs   Lab 04/13/21  0558 04/12/21  0630 04/11/21  0630 04/10/21  0640   WBC 11.7* 13.3* 14.6* 20.1*   HGB 8.2* 8.0* 7.8* 8.5*   MCV 88 88 88 89    178 186 243   * 128*  --  130*   POTASSIUM 4.3 4.2  --  4.6   CHLORIDE 97 96  --  97   CO2 29 28  --  26   BUN 27 24  --  30   CR 0.64 0.56  --  0.66   ANIONGAP 4 4  --  7   ESTIVEN 8.4* 8.3*  --  8.8   GLC 74 91  --  80     No results found for this or any previous visit (from the past 24 hour(s)).

## 2021-04-13 NOTE — PROGRESS NOTES
Pt A&O x4. VSS on 1L NC. Pt reports SOB with activity. Tele NSR. CMS intact. Neuros intact. Lungs Dim/clear. Non productive cough. BS+, BM-, flatus+. Tolerating regular diet. - N/V. Voiding adequately. Pt denied having pain. Up assist of 1.

## 2021-04-13 NOTE — PROGRESS NOTES
"[facility]    Infectious Disease Progress Note    Date of Service (when I saw the patient): 04/13/2021     Assessment & Plan   Olga Bailey is a 75 year old female who was admitted on 3/26/2021.     Impression:  1. 75 y.o with recent diagnosis of craniotomy and resection of glioblastoma multiforme.   2. Has been on very high dose steroid taper for the past month since the craniotomy. Not on any bactrim prophylaxis before admission.    3. Admitted with shortness of breath.   4. Found to have bilateral ground glass infiltrates on the imaging, very hypoxic.   5. COVID PCR negative x 2.   6. No leucocytosis, procal not elevated.      Recommendations:   High suspicion for PJP pneumonia. LDH high, high beta d glucan unable to confirm with the sputum studies as patient has been unable to provide any sputum   Steroids for PJP continue to taper to be off steroids in next 7- 10 days     Day 14/21- 26 septra slow improvement to be expected  but now much better   SWITCH TO PO TODAY continue as we taper off steroids.             Per neuro onc last note her cancer related plan was as listed in the note below, I would recommend follow up with Neuro onc after discharge.    I am copying and pasting Neuro onc last note below:   \" PLAN  -CANCER-DIRECTED THERAPY-  Will initiate chemoradiotherapy with temozolomide 75mg/m2 (140mg).   -Instructed to start temozolomide the evening before the start date of radiation and continue daily until the completion of radiation.   -Take temozolomide on an empty stomach, 30 minutes after Zofran dosing.  -Additional temozolomide teaching performed by RN/ pharmacy staff.      -Initial labs ordered; CBC with differential, CMP, Hepatitis B serologies; NR.  -Will continue weekly CBC and repeat CMP at follow-up.     -Medications prescribed;        -Zofran 4mg (1 to 2 tabs qHS 30 minutes prior to chemotherapy and then PRN nausea).       -For lymphopenia, prophylactic " "antibiotics are recommended during concurrent treatment. Will start Sulfamethoxazole-trimethoprim (Bactrim) 800 mg-160 mg; 1 tab orally Monday, Wednesday, and Friday for pneumocystis prophylaxis. If thrombocytopenia becomes an issue, can change to Dapsone, Atovaquone, or Pentamidine.        -Docusate 100 mg; 1 cap orally 3 times a day (stool softener for constipation)       -Senna 8.6 m tabs orally at bedtime (laxative as needed for constipation)     -STEROIDS-  -Continue to wean dexamethasone as tolerated;                           4mg daily ( - 3/28)                          2mg daily (3/29 - )                          2mg on , , , then stop.   -Dose may increase while undergoing radiation.\"          Robyn Branham MD    Interval History   O2 much better  No fever   No new micro   No new complaints       Physical Exam   Temp: 97.2  F (36.2  C) Temp src: Oral BP: 122/77 Pulse: 92   Resp: 18 SpO2: 96 % O2 Device: Nasal cannula with humidification Oxygen Delivery: 2 LPM  Vitals:    21 0600 04/10/21 0600 21 0700   Weight: 65.5 kg (144 lb 6.4 oz) 70.4 kg (155 lb 3.3 oz) 68.9 kg (151 lb 14.4 oz)     Vital Signs with Ranges  Temp:  [97.2  F (36.2  C)-98.8  F (37.1  C)] 97.2  F (36.2  C)  Pulse:  [80-95] 92  Resp:  [16-18] 18  BP: (112-139)/(73-89) 122/77  SpO2:  [92 %-97 %] 96 %    Constitutional: Awake, alert  Lungs: diminished bilateral   Cardiovascular: Regular rate and rhythm, normal S1 and S2, and no murmur noted  Abdomen: Normal bowel sounds, soft, non-distended, non-tender  Skin: No rashes, no cyanosis, no edema  Other:    Medications     dextrose 250 mL (21 0643)       amLODIPine  10 mg Oral Daily     busPIRone HCl  30 mg Oral BID     enoxaparin ANTICOAGULANT  70 mg Subcutaneous Q12H     famotidine  20 mg Oral BID     FLUoxetine  80 mg Oral Daily     insulin aspart  1-10 Units Subcutaneous TID AC     insulin aspart  1-7 Units Subcutaneous At Bedtime     levalbuterol  1.25 mg " Nebulization Q4H While awake     metoprolol tartrate  12.5 mg Oral BID     nystatin  500,000 Units Swish & Spit 4x Daily     predniSONE  40 mg Oral Daily    Followed by     [START ON 4/15/2021] predniSONE  30 mg Oral Daily    Followed by     [START ON 4/18/2021] predniSONE  20 mg Oral Daily    Followed by     [START ON 4/21/2021] predniSONE  10 mg Oral Daily     sodium chloride (PF)  3 mL Intracatheter Q8H     sulfamethoxazole-trimethoprim  2 tablet Oral TID       Data   All microbiology laboratory data reviewed.  Recent Labs   Lab Test 04/13/21  0558 04/12/21  0630 04/11/21  0630   WBC 11.7* 13.3* 14.6*   HGB 8.2* 8.0* 7.8*   HCT 25.8* 24.1* 23.7*   MCV 88 88 88    178 186     Recent Labs   Lab Test 04/13/21  0558 04/12/21  0630 04/10/21  0640   CR 0.64 0.56 0.66     No lab results found.  Recent Labs   Lab Test 03/29/21 1951 03/29/21 1946   CULT No growth No growth       Attestation:  Total time on the floor involved in the patient's care: 35 minutes. Total time spent in counseling/care coordination: >50%

## 2021-04-13 NOTE — PLAN OF CARE
A&O x4. VSS on 2 L. Lung sounds clear, dyspnea on exertion. Reg diet, tolerating well. Bowel sounds active +flatus. Voiding adequately. Pure-wick in place. Denies pain. Up with 1 assist.

## 2021-04-14 ENCOUNTER — APPOINTMENT (OUTPATIENT)
Dept: PHYSICAL THERAPY | Facility: CLINIC | Age: 76
DRG: 166 | End: 2021-04-14
Attending: NURSE PRACTITIONER
Payer: MEDICARE

## 2021-04-14 ENCOUNTER — APPOINTMENT (OUTPATIENT)
Dept: CT IMAGING | Facility: CLINIC | Age: 76
DRG: 166 | End: 2021-04-14
Attending: NURSE PRACTITIONER
Payer: MEDICARE

## 2021-04-14 ENCOUNTER — HOSPITAL ENCOUNTER (OUTPATIENT)
Dept: NEUROLOGY | Facility: CLINIC | Age: 76
DRG: 166 | End: 2021-04-14
Attending: NURSE PRACTITIONER
Payer: MEDICARE

## 2021-04-14 PROBLEM — U07.1 PNEUMONIA DUE TO 2019 NOVEL CORONAVIRUS: Status: RESOLVED | Noted: 2021-03-26 | Resolved: 2021-04-14

## 2021-04-14 PROBLEM — J12.82 PNEUMONIA DUE TO 2019 NOVEL CORONAVIRUS: Status: RESOLVED | Noted: 2021-03-26 | Resolved: 2021-04-14

## 2021-04-14 LAB
ALBUMIN SERPL-MCNC: 2.6 G/DL (ref 3.4–5)
ALBUMIN UR-MCNC: 10 MG/DL
ALP SERPL-CCNC: 88 U/L (ref 40–150)
ALT SERPL W P-5'-P-CCNC: 61 U/L (ref 0–50)
ANION GAP SERPL CALCULATED.3IONS-SCNC: 7 MMOL/L (ref 3–14)
APPEARANCE UR: ABNORMAL
AST SERPL W P-5'-P-CCNC: 22 U/L (ref 0–45)
BILIRUB SERPL-MCNC: 0.2 MG/DL (ref 0.2–1.3)
BILIRUB UR QL STRIP: NEGATIVE
BLD PROD TYP BPU: NORMAL
BLD UNIT ID BPU: 0
BLOOD PRODUCT CODE: NORMAL
BPU ID: NORMAL
BUN SERPL-MCNC: 42 MG/DL (ref 7–30)
CA-I BLD-MCNC: 4.9 MG/DL (ref 4.4–5.2)
CALCIUM SERPL-MCNC: 8.8 MG/DL (ref 8.5–10.1)
CHLORIDE SERPL-SCNC: 98 MMOL/L (ref 94–109)
CO2 SERPL-SCNC: 25 MMOL/L (ref 20–32)
COLOR UR AUTO: YELLOW
CREAT SERPL-MCNC: 0.79 MG/DL (ref 0.52–1.04)
ERYTHROCYTE [DISTWIDTH] IN BLOOD BY AUTOMATED COUNT: 15.6 % (ref 10–15)
ERYTHROCYTE [DISTWIDTH] IN BLOOD BY AUTOMATED COUNT: 15.7 % (ref 10–15)
GFR SERPL CREATININE-BSD FRML MDRD: 72 ML/MIN/{1.73_M2}
GLUCOSE BLDC GLUCOMTR-MCNC: 205 MG/DL (ref 70–99)
GLUCOSE BLDC GLUCOMTR-MCNC: 93 MG/DL (ref 70–99)
GLUCOSE SERPL-MCNC: 240 MG/DL (ref 70–99)
GLUCOSE UR STRIP-MCNC: NEGATIVE MG/DL
HCT VFR BLD AUTO: 22.6 % (ref 35–47)
HCT VFR BLD AUTO: 24.6 % (ref 35–47)
HGB BLD-MCNC: 7.2 G/DL (ref 11.7–15.7)
HGB BLD-MCNC: 7.2 G/DL (ref 11.7–15.7)
HGB BLD-MCNC: 7.3 G/DL (ref 11.7–15.7)
HGB BLD-MCNC: 8.1 G/DL (ref 11.7–15.7)
HGB UR QL STRIP: NEGATIVE
KETONES UR STRIP-MCNC: NEGATIVE MG/DL
LACTATE BLD-SCNC: 2.8 MMOL/L (ref 0.7–2)
LEUKOCYTE ESTERASE UR QL STRIP: NEGATIVE
MAGNESIUM SERPL-MCNC: 2.5 MG/DL (ref 1.6–2.3)
MCH RBC QN AUTO: 29 PG (ref 26.5–33)
MCH RBC QN AUTO: 29 PG (ref 26.5–33)
MCHC RBC AUTO-ENTMCNC: 32.3 G/DL (ref 31.5–36.5)
MCHC RBC AUTO-ENTMCNC: 32.9 G/DL (ref 31.5–36.5)
MCV RBC AUTO: 88 FL (ref 78–100)
MCV RBC AUTO: 90 FL (ref 78–100)
NITRATE UR QL: NEGATIVE
PH UR STRIP: 6 PH (ref 5–7)
PHOSPHATE SERPL-MCNC: 4.6 MG/DL (ref 2.5–4.5)
PLATELET # BLD AUTO: 171 10E9/L (ref 150–450)
PLATELET # BLD AUTO: 187 10E9/L (ref 150–450)
POTASSIUM SERPL-SCNC: 5.3 MMOL/L (ref 3.4–5.3)
PROCALCITONIN SERPL-MCNC: 0.14 NG/ML
PROT SERPL-MCNC: 5.4 G/DL (ref 6.8–8.8)
RBC # BLD AUTO: 2.52 10E12/L (ref 3.8–5.2)
RBC # BLD AUTO: 2.79 10E12/L (ref 3.8–5.2)
RBC #/AREA URNS AUTO: 1 /HPF (ref 0–2)
SODIUM SERPL-SCNC: 130 MMOL/L (ref 133–144)
SOURCE: ABNORMAL
SP GR UR STRIP: 1.02 (ref 1–1.03)
SQUAMOUS #/AREA URNS AUTO: 0 /HPF (ref 0–1)
TRANSFUSION STATUS PATIENT QL: NORMAL
TRANSFUSION STATUS PATIENT QL: NORMAL
TROPONIN I SERPL-MCNC: 0.02 UG/L (ref 0–0.04)
TROPONIN I SERPL-MCNC: <0.015 UG/L (ref 0–0.04)
URATE CRY #/AREA URNS HPF: ABNORMAL /HPF
UROBILINOGEN UR STRIP-MCNC: 0 MG/DL (ref 0–2)
WBC # BLD AUTO: 10.2 10E9/L (ref 4–11)
WBC # BLD AUTO: 16.1 10E9/L (ref 4–11)
WBC #/AREA URNS AUTO: 0 /HPF (ref 0–5)

## 2021-04-14 PROCEDURE — 86923 COMPATIBILITY TEST ELECTRIC: CPT | Performed by: NURSE PRACTITIONER

## 2021-04-14 PROCEDURE — 99233 SBSQ HOSP IP/OBS HIGH 50: CPT | Performed by: INTERNAL MEDICINE

## 2021-04-14 PROCEDURE — 250N000013 HC RX MED GY IP 250 OP 250 PS 637: Performed by: HOSPITALIST

## 2021-04-14 PROCEDURE — 84145 PROCALCITONIN (PCT): CPT | Performed by: NURSE PRACTITIONER

## 2021-04-14 PROCEDURE — 80053 COMPREHEN METABOLIC PANEL: CPT | Performed by: NURSE PRACTITIONER

## 2021-04-14 PROCEDURE — 83605 ASSAY OF LACTIC ACID: CPT | Performed by: NURSE PRACTITIONER

## 2021-04-14 PROCEDURE — BW111ZZ FLUOROSCOPY OF ABDOMEN AND PELVIS USING LOW OSMOLAR CONTRAST: ICD-10-PCS | Performed by: INTERNAL MEDICINE

## 2021-04-14 PROCEDURE — 86850 RBC ANTIBODY SCREEN: CPT | Performed by: NURSE PRACTITIONER

## 2021-04-14 PROCEDURE — 999N001017 HC STATISTIC GLUCOSE BY METER IP

## 2021-04-14 PROCEDURE — 120N000001 HC R&B MED SURG/OB

## 2021-04-14 PROCEDURE — 99291 CRITICAL CARE FIRST HOUR: CPT | Performed by: NURSE PRACTITIONER

## 2021-04-14 PROCEDURE — 97530 THERAPEUTIC ACTIVITIES: CPT | Mod: GP

## 2021-04-14 PROCEDURE — 250N000011 HC RX IP 250 OP 636: Performed by: INTERNAL MEDICINE

## 2021-04-14 PROCEDURE — 999N000190 HC STATISTIC VAT ROUNDS

## 2021-04-14 PROCEDURE — 120N000013 HC R&B IMCU

## 2021-04-14 PROCEDURE — 84484 ASSAY OF TROPONIN QUANT: CPT | Performed by: NURSE PRACTITIONER

## 2021-04-14 PROCEDURE — 250N000012 HC RX MED GY IP 250 OP 636 PS 637: Performed by: INTERNAL MEDICINE

## 2021-04-14 PROCEDURE — 81001 URINALYSIS AUTO W/SCOPE: CPT | Performed by: NURSE PRACTITIONER

## 2021-04-14 PROCEDURE — 83735 ASSAY OF MAGNESIUM: CPT | Performed by: NURSE PRACTITIONER

## 2021-04-14 PROCEDURE — 250N000009 HC RX 250: Performed by: HOSPITALIST

## 2021-04-14 PROCEDURE — 86900 BLOOD TYPING SEROLOGIC ABO: CPT | Performed by: NURSE PRACTITIONER

## 2021-04-14 PROCEDURE — 85027 COMPLETE CBC AUTOMATED: CPT | Performed by: INTERNAL MEDICINE

## 2021-04-14 PROCEDURE — C9113 INJ PANTOPRAZOLE SODIUM, VIA: HCPCS | Performed by: INTERNAL MEDICINE

## 2021-04-14 PROCEDURE — 85018 HEMOGLOBIN: CPT | Performed by: NURSE PRACTITIONER

## 2021-04-14 PROCEDURE — 93005 ELECTROCARDIOGRAM TRACING: CPT

## 2021-04-14 PROCEDURE — 70450 CT HEAD/BRAIN W/O DYE: CPT | Mod: ME

## 2021-04-14 PROCEDURE — 94640 AIRWAY INHALATION TREATMENT: CPT

## 2021-04-14 PROCEDURE — 258N000003 HC RX IP 258 OP 636: Performed by: INTERNAL MEDICINE

## 2021-04-14 PROCEDURE — P9016 RBC LEUKOCYTES REDUCED: HCPCS | Performed by: NURSE PRACTITIONER

## 2021-04-14 PROCEDURE — 85027 COMPLETE CBC AUTOMATED: CPT | Performed by: NURSE PRACTITIONER

## 2021-04-14 PROCEDURE — 85018 HEMOGLOBIN: CPT | Performed by: INTERNAL MEDICINE

## 2021-04-14 PROCEDURE — 74176 CT ABD & PELVIS W/O CONTRAST: CPT | Mod: ME

## 2021-04-14 PROCEDURE — 84100 ASSAY OF PHOSPHORUS: CPT | Performed by: NURSE PRACTITIONER

## 2021-04-14 PROCEDURE — 250N000013 HC RX MED GY IP 250 OP 250 PS 637: Performed by: INTERNAL MEDICINE

## 2021-04-14 PROCEDURE — 999N000157 HC STATISTIC RCP TIME EA 10 MIN

## 2021-04-14 PROCEDURE — 94640 AIRWAY INHALATION TREATMENT: CPT | Mod: 76

## 2021-04-14 PROCEDURE — 95816 EEG AWAKE AND DROWSY: CPT

## 2021-04-14 PROCEDURE — 250N000011 HC RX IP 250 OP 636: Performed by: HOSPITALIST

## 2021-04-14 PROCEDURE — 82330 ASSAY OF CALCIUM: CPT | Performed by: NURSE PRACTITIONER

## 2021-04-14 PROCEDURE — 250N000011 HC RX IP 250 OP 636: Performed by: PSYCHIATRY & NEUROLOGY

## 2021-04-14 PROCEDURE — 86901 BLOOD TYPING SEROLOGIC RH(D): CPT | Performed by: NURSE PRACTITIONER

## 2021-04-14 RX ORDER — SODIUM CHLORIDE 9 MG/ML
INJECTION, SOLUTION INTRAVENOUS CONTINUOUS
Status: DISCONTINUED | OUTPATIENT
Start: 2021-04-14 | End: 2021-04-17

## 2021-04-14 RX ORDER — ACYCLOVIR 50 MG/G
OINTMENT TOPICAL
Status: DISCONTINUED | OUTPATIENT
Start: 2021-04-14 | End: 2021-04-15

## 2021-04-14 RX ORDER — NITROGLYCERIN 0.4 MG/1
0.4 TABLET SUBLINGUAL EVERY 5 MIN PRN
Status: DISCONTINUED | OUTPATIENT
Start: 2021-04-14 | End: 2021-04-26 | Stop reason: HOSPADM

## 2021-04-14 RX ORDER — LIDOCAINE 40 MG/G
CREAM TOPICAL
Status: DISCONTINUED | OUTPATIENT
Start: 2021-04-14 | End: 2021-04-14

## 2021-04-14 RX ORDER — LEVETIRACETAM 5 MG/ML
500 INJECTION INTRAVASCULAR EVERY 12 HOURS
Status: DISCONTINUED | OUTPATIENT
Start: 2021-04-14 | End: 2021-04-17

## 2021-04-14 RX ORDER — LORAZEPAM 2 MG/ML
0.5 INJECTION INTRAMUSCULAR 3 TIMES DAILY PRN
Status: DISCONTINUED | OUTPATIENT
Start: 2021-04-14 | End: 2021-04-19

## 2021-04-14 RX ORDER — ACYCLOVIR 50 MG/G
OINTMENT TOPICAL
Status: DISCONTINUED | OUTPATIENT
Start: 2021-04-14 | End: 2021-04-14

## 2021-04-14 RX ORDER — AMOXICILLIN 250 MG
2 CAPSULE ORAL 2 TIMES DAILY
Status: DISCONTINUED | OUTPATIENT
Start: 2021-04-14 | End: 2021-04-26 | Stop reason: HOSPADM

## 2021-04-14 RX ORDER — NALOXONE HYDROCHLORIDE 0.4 MG/ML
0.4 INJECTION, SOLUTION INTRAMUSCULAR; INTRAVENOUS; SUBCUTANEOUS
Status: DISCONTINUED | OUTPATIENT
Start: 2021-04-14 | End: 2021-04-26 | Stop reason: HOSPADM

## 2021-04-14 RX ORDER — NALOXONE HYDROCHLORIDE 0.4 MG/ML
0.2 INJECTION, SOLUTION INTRAMUSCULAR; INTRAVENOUS; SUBCUTANEOUS
Status: DISCONTINUED | OUTPATIENT
Start: 2021-04-14 | End: 2021-04-26 | Stop reason: HOSPADM

## 2021-04-14 RX ORDER — PANTOPRAZOLE SODIUM 40 MG/1
40 TABLET, DELAYED RELEASE ORAL
Status: DISCONTINUED | OUTPATIENT
Start: 2021-04-14 | End: 2021-04-26 | Stop reason: HOSPADM

## 2021-04-14 RX ORDER — HYDROMORPHONE HCL IN WATER/PF 6 MG/30 ML
.2-.4 PATIENT CONTROLLED ANALGESIA SYRINGE INTRAVENOUS
Status: DISCONTINUED | OUTPATIENT
Start: 2021-04-14 | End: 2021-04-26 | Stop reason: HOSPADM

## 2021-04-14 RX ADMIN — ACYCLOVIR: 50 OINTMENT TOPICAL at 20:48

## 2021-04-14 RX ADMIN — SULFAMETHOXAZOLE AND TRIMETHOPRIM 320 MG: 80; 16 INJECTION, SOLUTION, CONCENTRATE INTRAVENOUS at 21:05

## 2021-04-14 RX ADMIN — PREDNISONE 30 MG: 5 TABLET ORAL at 13:17

## 2021-04-14 RX ADMIN — ENOXAPARIN SODIUM 70 MG: 80 INJECTION SUBCUTANEOUS at 10:25

## 2021-04-14 RX ADMIN — PANTOPRAZOLE SODIUM 40 MG: 40 TABLET, DELAYED RELEASE ORAL at 20:39

## 2021-04-14 RX ADMIN — HYDROMORPHONE HYDROCHLORIDE 0.2 MG: 0.2 INJECTION, SOLUTION INTRAMUSCULAR; INTRAVENOUS; SUBCUTANEOUS at 17:48

## 2021-04-14 RX ADMIN — SODIUM CHLORIDE: 9 INJECTION, SOLUTION INTRAVENOUS at 23:51

## 2021-04-14 RX ADMIN — ONDANSETRON 4 MG: 2 INJECTION INTRAMUSCULAR; INTRAVENOUS at 10:26

## 2021-04-14 RX ADMIN — OXYMETAZOLINE HYDROCHLORIDE 3 SPRAY: 0.05 SPRAY NASAL at 10:59

## 2021-04-14 RX ADMIN — BISACODYL 10 MG: 10 SUPPOSITORY RECTAL at 13:17

## 2021-04-14 RX ADMIN — PROCHLORPERAZINE EDISYLATE 5 MG: 5 INJECTION INTRAMUSCULAR; INTRAVENOUS at 13:09

## 2021-04-14 RX ADMIN — NYSTATIN 500000 UNITS: 100000 SUSPENSION ORAL at 22:13

## 2021-04-14 RX ADMIN — PANTOPRAZOLE SODIUM 40 MG: 40 INJECTION, POWDER, FOR SOLUTION INTRAVENOUS at 17:53

## 2021-04-14 RX ADMIN — LEVETIRACETAM 500 MG: 5 INJECTION, SOLUTION INTRAVENOUS at 21:32

## 2021-04-14 RX ADMIN — SODIUM CHLORIDE 500 ML: 9 INJECTION, SOLUTION INTRAVENOUS at 16:54

## 2021-04-14 RX ADMIN — SENNOSIDES AND DOCUSATE SODIUM 2 TABLET: 8.6; 5 TABLET ORAL at 20:38

## 2021-04-14 RX ADMIN — BUSPIRONE HYDROCHLORIDE 30 MG: 15 TABLET ORAL at 20:39

## 2021-04-14 RX ADMIN — Medication 12.5 MG: at 20:38

## 2021-04-14 RX ADMIN — LORAZEPAM 0.5 MG: 0.5 TABLET ORAL at 11:53

## 2021-04-14 RX ADMIN — LEVALBUTEROL HYDROCHLORIDE 1.25 MG: 1.25 SOLUTION, CONCENTRATE RESPIRATORY (INHALATION) at 07:53

## 2021-04-14 RX ADMIN — ACYCLOVIR: 50 OINTMENT TOPICAL at 17:59

## 2021-04-14 RX ADMIN — LEVALBUTEROL HYDROCHLORIDE 1.25 MG: 1.25 SOLUTION, CONCENTRATE RESPIRATORY (INHALATION) at 11:57

## 2021-04-14 ASSESSMENT — ACTIVITIES OF DAILY LIVING (ADL)
ADLS_ACUITY_SCORE: 18

## 2021-04-14 ASSESSMENT — MIFFLIN-ST. JEOR: SCORE: 1102.13

## 2021-04-14 NOTE — PROGRESS NOTES
[facility]    Medicine Progress Note - Hospitalist Service       Date of Admission:  3/26/2021  Assessment & Plan       Olga Bailey is a 75 year old female admitted on 3/26/2021.  Past medical history that is most notable for recent craniotomy and resection of glioblastoma multiforme, as well as uncomplicated asthma, who presents with dyspnea and is found to have acute bilateral pneumonia.     Acute hypoxic respiratory failure due to bilateral pneumonia, suspected PCP   -- discussed with ID, all consistent with PCP   -- Bactrim DS 2 tid for 5 more days   -- start weaning Prednisone (on 30 mg daily today)     Leukocytosis   -- infection, and steroid affect, slowly improving    Bilateral LE DVT   -- US 3/29 occlusive DVT of bilateral posterior tibial veins and soleal veins    -- continue on therapeutic lovenox   Abdominal pain -- epigastric, no flatus ... ileus vs related to GI irritation from steroids and Bactrim   -- switch to clear liquids, stop Pepcid go with PPI bid   -- enema to move bowels, Senokot 2 bid      Epistaxis   -- now stable     Hyponatremia  Labs 4/7 consistent with SIADH, likely due to pulmonary process.    - Na remains stable; now with mild LE edema but already urinating nearly 3L past 24 hours so will hold on any lasix as will likely improve with discontinuation of TPN 4/9     Steroid induced hyperglycemia -- resolved   -- stop insulin and glucometer checks     Urinary incontinence -- has pure wick     Hypokalemia, resolved     GBM s/p resection 2/23/21  Followed by Dr. Baker of Neuro-Oncology.  She is in process of being set up to start chemotherapy and XRT next week.   Appears to have been on prolonged steroids following neurosurgery as was discharged without plan for taper and initiated taper only recently in follow up with Dr. Baker.  - now on prednisone as above with plan for prolonged taper  - has not yet initiated chemo (temozolomide) and planned radiation  -  daughter reporting she had been working with SLP for language/cognition following her surgery, will order consult  - left message for X- -- can do online 2nd opinion with [facility] or talk with Dr. Stephanie Baker     Hypertension  - continue PTA amlodipine     Sinus tachycardia -- clinically improved  - metoprolol 12.5 mg bid.      Asthma  MARCELLE  - nebs prn  - resume CPAP with home setting 4/9     Depression and anxiety  Insomnia  - continue PTA Prozac and Buspar  - continue PTA Atarax for sleep  - prn lorazepam     GERD  - switch to PPI as above     Chronic anemia of chronic disease, with Acute blood loss  Baseline hgb 10-11 g/dL, stable at 8     Non-severe malnutrition  Noted decrease oral intake and drop in albumin, subcutaneous fat loss on exam.   - PICC line in place  - excellent oral intake, tapered off TPN 4/9       Diet: Snacks/Supplements Adult: Boost Shake; Between Meals  Regular Diet Adult  Snacks/Supplements Adult: Magic Cup; With Meals  Room Service  Advance Diet as Tolerated    DVT Prophylaxis: Enoxaparin (Lovenox) SQ  Martino Catheter: not present  Code Status: Full Code           Disposition Plan   Expected discharge: pending plan ?in 2 days, ?home with daughterl discuss with Dr. Baker and she would like to start Chemo Radiation in a week, suspect [facility] will offer same treatment but family can get 2nd opinion online from [facility]. Left message for X- today with options, and to call if questions.       Bonilla Tian MD  Hospitalist Service  [facility]  Contact information available via [facility] Paging/Directory    (35 min total)    ______    Interval History   Down to 1 lpm o2, but gi upset and no BM for 2 days, and no flatus for ?1 day.  Abdomin distended but initially said no pain, then vomited after Pred given.     Physical Exam   Vital Signs: Temp: 97.7  F (36.5  C) Temp src: Oral BP: 121/76 Pulse: 105   Resp: 16 SpO2: 96 % O2 Device: Nasal cannula Oxygen Delivery:  1 LPM  Weight: 140 lbs 10.46 oz    Gen: NAD, pleasant, elderly, frail  Resp: no focal crackles,  no wheezes, no increased work of resp  CV: S1S2 heard, reg rhythm, reg rate  Abdo: soft, nontender, BS present but decreased, moderately distended  Ext: calves nontender, well perfused  Neuro: AAOx3, CN grossly intact, no facial asymmetry      Data   Recent Labs   Lab 04/14/21  0548 04/13/21  0558 04/12/21  0630 04/10/21  0640 04/10/21  0640   WBC 10.2 11.7* 13.3*   < > 20.1*   HGB 8.1* 8.2* 8.0*   < > 8.5*   MCV 88 88 88   < > 89    175 178   < > 243   NA  --  130* 128*  --  130*   POTASSIUM  --  4.3 4.2  --  4.6   CHLORIDE  --  97 96  --  97   CO2  --  29 28  --  26   BUN  --  27 24  --  30   CR  --  0.64 0.56  --  0.66   ANIONGAP  --  4 4  --  7   ESTIVEN  --  8.4* 8.3*  --  8.8   GLC  --  74 91  --  80    < > = values in this interval not displayed.     No results found for this or any previous visit (from the past 24 hour(s)).

## 2021-04-14 NOTE — CODE/RAPID RESPONSE
"[facility]    RRT Note  4/14/2021   Time Called: 3: 04 PM    RRT called for: altered mental status and jerking     Assessment & Plan   Altered mental status, concern for syncope versus seizure   Abdominal pain, not localized   Retroperitoneal hematoma, large right, and small left rectus sheath   RRT called after Ms. Bailey was sitting on commode, had sudden loss of consciousness with jerking of extremities. Upon my arrival patient laying in bed; she did not fall and was assisted back to bed by nursing staff without injury. No arrhythmia on telemetry, no hypotension. On exam Ms. Bailey is alert, oriented, but very sleepy and slow to respond. Mild diaphoresis. She is endorsing abdominal pain and is tender to palpation of right side of abdomen. She is noted to have a lesion on the left side of her lip and has history of \"cold sores\" which have been treated with Valtrex or similar. No obvious focal neurologic deficits. She is normoglycemic.     She is on full anticoagulation with BID Lovenox for bilateral lower extremity DVTs. Pharmacy reviewed medication list for possible inducers of seizure. Wellbutrin was not resumed this admission, however she was admitted 2- weeks ago. High dose Bactrim has been linked to seizure in at least one- patient specifically on high-dose Bactrim for PCP.     INTERVENTIONS:  - CT head; she is status post GBM resection in February 2021 by Dr. Cummings   - CBC, CMP, phosphorus, magnesium, VBG, troponin, procalcitonin, lactic acid   - CT abdomen/pelvis- large right retroperitoneal hematoma and small left rectus sheath hematoma  - acyclovir gel to left side of lip for presumed HSV     Discussed with and defer further cares to Dr. Rosa, Hospitalist.     Code Status:   Full Code      KAYCE Swann, CNP  Hospitalist Service, House Officer  [facility]     Text Page  Pager: [phone]    Allergies   Allergies   Allergen Reactions     Bacitracin " Rash     Bactroban is effective; No difficulties     Erythromycin      intolerant.     Meperidine Hcl      intolerant only   Demerol     Chloraprep One Step Rash       Physical Exam   Vital Signs with Ranges:  Temp:  [97.7  F (36.5  C)-98.6  F (37  C)] 97.7  F (36.5  C)  Pulse:  [] 113  Resp:  [16-29] 29  BP: (100-134)/() 111/73  SpO2:  [92 %-100 %] 98 %  No intake/output data recorded.    Constitutional: 75- year old female laying in bed without obvious acute distress.    Pulmonary: Lung fields clear. She is on oxygen. Tachypnea without significant respiratory distress.   Cardiovascular: S1, S2 without obvious murmur, rub, or gallop. She appears adequately perfused.   GI: Distended, firm, tender to palpation.   Skin/Integumen: No obvious new concerning rashes or lesions on exposed skin.   Neuro: Sleepy, oriented x 4. Non-focal. Generalized weakness.   Psych:  Calm, cooperative.   Extremities: Moves all extremities.     Troponin:    Recent Labs   Lab Test 03/29/21 1947   TROPI <0.015       IMAGING: (X-ray/CT/MRI)   Recent Results (from the past 24 hour(s))   CT Head w/o Contrast    Narrative    CT SCAN OF THE HEAD WITHOUT CONTRAST   4/14/2021 3:38 PM     HISTORY: Seizure, abnormal neuro exam; syncope, history of GBM post-  resection.    TECHNIQUE:  Axial images of the head and coronal reformations without  IV contrast material. Radiation dose for this scan was reduced using  automated exposure control, adjustment of the mA and/or kV according  to patient size, or iterative reconstruction technique.    COMPARISON: MRI brain dated 2/25/2021.    FINDINGS: Again seen are posterior total changes status post left  frontal craniotomy for resection of a previously demonstrated  enhancing mass centered in the left postcentral gyrus/supramarginal  gyrus. There is encephalomalacia corresponding to the previously seen  resection cavity. Previously seen mild local mass effect in the left  cerebral hemisphere has  resolved in the interim.    Redemonstrated extensive nonspecific hypoattenuation throughout the  subcortical and deep/paraventricular white matter is again noted.  Small calcified extra-axial mass overlying the lateral right occipital  lobe measuring 5 mm x 9 mm in the axial plane is unchanged and most  consistent with a meningioma. The ventricles are normal in size and  configuration. There is no evidence for acute intracranial hemorrhage,  extra-axial fluid collection or significant mass effect/herniation.  Mild generalized brain parenchymal volume loss.    The orbits appear within normal limits, accounting for limitations of  technique. The paranasal sinuses are free of significant disease. The  mastoid and middle ear cavities are clear. Left temporomandibular  joint degenerative changes. Prominent degenerative changes involving  the upper cervical spine are noted, particularly severe involving the  left lateral atlantooccipital joint.      Impression    IMPRESSION:  1. No definite CT findings of acute intracranial abnormality.  2. Redemonstrated findings status post left-sided craniotomy for  resection of a previously demonstrated malignant tumor centered in the  left lateral postcentral/supramarginal gyri, as described. Continued  MR follow-up/monitoring is suggested.  3. No significant change in extensive nonspecific hypoattenuation  throughout the cerebral white matter.  4. Unchanged small calcified dural based mass overlying the right  occipital lobe, likely a meningioma.       CBC with Diff:  Recent Labs   Lab Test 04/14/21  0548 04/05/21  0640 04/05/21  0640   WBC 10.2   < > 15.4*   HGB 8.1*   < > 9.1*   MCV 88   < > 88      < > 214   INR  --   --  1.02    < > = values in this interval not displayed.        Lactic Acid:    Lab Results   Component Value Date    LACT 2.2 04/06/2021           Comprehensive Metabolic Panel:  Recent Labs   Lab 04/13/21  0558 04/09/21  0555 04/09/21  0555   *   < >  132*   POTASSIUM 4.3   < > 4.5   CHLORIDE 97   < > 100   CO2 29   < > 28   ANIONGAP 4   < > 4   GLC 74   < > 131*   BUN 27   < > 27   CR 0.64   < > 0.54   GFRESTIMATED 87   < > >90   GFRESTBLACK >90   < > >90   ESTIVEN 8.4*   < > 8.7   MAG  --   --  2.3   PHOS  --   --  3.1    < > = values in this interval not displayed.     Time Spent on this Encounter   I spent 74 minutes of critical care time on the unit/floor managing the care of Olga Bailey. Upon evaluation, this patient had a high probability of imminent or life-threatening deterioration due to syncope versus other, which required my direct attention, intervention, and personal management. 100% of my time was spent at the bedside counseling the patient and/or coordinating care regarding services listed in this note.

## 2021-04-14 NOTE — PLAN OF CARE
A&O x4. VSS. Lung sounds clear/diminished. Reg diet, tolerating well. Bowel sounds active +flatus. Voiding adequately. Pure wick in place. Denies pain. Up with 1 assist.

## 2021-04-14 NOTE — PROGRESS NOTES
SPIRITUAL HEALTH SERVICES Progress Note  [facility]    Referral Source: Follow-up    PT declines visit today because she is not feeling well. PT welcomes visit at another time.    Follow-up with PT 04/16/21 per request by daughter ([place]).      Doug Silver  Chaplain Resident

## 2021-04-14 NOTE — PROVIDER NOTIFICATION
"Paged Dr Rosa of pt nauseous, zofran given without relief.       1335 Notified Dr Rosa of pt had one episode of emesis, prednisone given 15min before. Also having sharp cramping pain when patient turns in bed.      1421 Dr Rosa called back and could be related to constipation, continue to monitor. Will adjust some medications       1436  Paged Dr Rosa of pt having sharp/cramping abdominal pain. Pt stating \"I cant take this pain\" Abdomen non tender. Illeus? Obstruction? Enema?      "

## 2021-04-14 NOTE — PLAN OF CARE
OT: Attempted therapy again in the afternoon, pt unable to participate due to feeling unwell. RN notified.

## 2021-04-14 NOTE — PROGRESS NOTES
Updated note    Spoke with Jailyn Hubbard who was called on rapid response -- patient was up to the bathroom after enema, bowels moved twice per nurse, then started jerking and less responsive, but nurse was still able to communicate with her, but more lethargic.      Speaking to patient 30 min after episode she does not recall what happened, no headache, still nausea with abdominal discomfort, and daughter reports when they moved her this AM she had severe right flank pain, and continues on Lovenox 70 mg subcutaneous bid for her hypercoagulable state from glioblastoma and bilateral DVT.     Hgb 7.3  Abd CT with large right retroperitoneal bleed, with  (consistent with acute bleed with volume loss)  Head CT -- postop changes, no bleed  Lactate 2.8 -- suspect related to retroperitoneal bleed    -- transfuse 1 unit PRBC's and serial hgb's  -- NPO given nausea and slight ileus, suspect related to retroperitoneal bleed  -- discussed with IR, there are aware of patient but will hold Lovenox and nothing to embolize, and will discuss with IR tomorrow need for IVC filter once off Lovenox.   -- will check Lactate after fluid and blood given    Bonilla Tian MD  Pager: [phone]  Cell Phone:  [phone]

## 2021-04-14 NOTE — PROGRESS NOTES
Routine EEG, [facility]  in room 305 completed. Pt had a difficulty relaxing during the test and there was persistent muscle artifact observed. Photic and stimulation activation performed. Pt achieved awake and drowsy state.

## 2021-04-14 NOTE — PROGRESS NOTES
[facility]          OUTPATIENT OCCUPATIONAL THERAPY  EVALUATION  PLAN OF TREATMENT FOR OUTPATIENT REHABILITATION  (COMPLETE FOR INITIAL CLAIMS ONLY)  Patient's Last Name, First Name, M.I.  YOB: 1945  LeoOlga  ANA MARÍA                        Provider's Name  [facility] Medical Record No.  [id number]                               Onset Date:     02/17/21   Start of Care Date:     03/16/21   Type:     ___PT   _X_OT   ___SLP Medical Diagnosis:     Cognitive-deficits, s/p craniotomy, glioblastoma                           OT Diagnosis:     decreased ADL/IADL Visits from SOC:  1   _________________________________________________________________________________  Plan of Treatment/Functional Goals:  ADL training, IADL training, Cognitive skills, Coordination training, Manual therapy, Neuromuscular re-education, Visual perception, Strengthening, Stretching, Therapeutic activities, Self care/Home management, ROM, Cognitive performance testing                    Goals  Goal Identifier: FACIT  Goal Description: Pt will demonstrate independent recall and application of 3 energy conservation modifications/strategies, as applied to daily home routines, with improved scores on the FACIT by 5 points.  Target Date: 06/14/21     Goal Identifier: Testing Results  Goal Description: Patient and family to verbalize understanding of  IADL screening, Cognitive Performance Test, and Cognitive Assessment of [place] results and identify 3 strategies to increase patient's safety and independence in the home and community setting.  Target Date: 06/14/21     Goal Identifier: Money Management  Goal Description: Patient will demonstrate the ability to complete simple to moderately complex money management tasks with 90% accuracy for increased attention to detail and problem solving skills to resume  finances at home and manage money in the community.  Target Date: 06/14/21     Goal Identifier: Visual Perception  Goal Description: Pt to demonstrate improved visual perception as needed to don bra, lower body clothes, copying shapes/deisgn and represent time on clocks with 2/5 effort.  Target Date: 06/14/21     Goal Identifier: Meal Prep  Goal Description: Patient to complete a new, simple to moderately complex meal prep task using visual recipes as needed, using stove and/or oven with modified independence, for increased safety and independence in the home.  Target Date: 06/14/21     Goal Identifier: Medication Set Up  Goal Description: Patient will independently set up their medications, using a pillbox, on 2 separate occasions for increased accuracy and independence with managing medications.  Target Date: 06/14/21     Goal Identifier: STM  Goal Description: Patient will demonstrate improved memory skills by completing short-term memory tasks in occupational therapy with 85-90% accuracy using compensatory strategies for increased safety and independence with ADL/IADLS (remembering to take medications, turn off the stove when done, etc.).  Target Date: 06/14/21     Goal Identifier: GMC/FMC//Pinch  Goal Description: Patient will demonstrate improved  by 5# in B hands, pinch by 3# and motor planning skills (dysdiadokinesis x 20 cycles and 9 hole peg to 30 sec) as needed to support dressing, household management and needle work tool use.  Target Date: 06/14/21          Therapy Frequency: 2x/week x 12 weeks        Sara Khalil OTR          I CERTIFY THE NEED FOR THESE SERVICES FURNISHED UNDER        THIS PLAN OF TREATMENT AND WHILE UNDER MY CARE     (Physician co-signature of this document indicates review and certification of the therapy plan).                 ,    Certification date from: 03/16/21, Certification date to: 06/14/21               Referring Physician: Dr. Jl Rosenbaum     Initial  Assessment        See Epic Evaluation      Start Of Care Date: 03/16/21

## 2021-04-14 NOTE — PROVIDER NOTIFICATION
Paged Dr Rosa if okay to discontinue insulin and glucose checks?    3828 Dr Rosa called back and he will discontinue those orders

## 2021-04-14 NOTE — PLAN OF CARE
SLP: Attempted to see patient for speech/language tx. Patient declined stating she felt too sick. Will continue to follow.

## 2021-04-14 NOTE — PROGRESS NOTES
Care Management Follow Up    Length of Stay (days): 19    Expected Discharge Date: 04/16/21     Concerns to be Addressed: discharge planning  Pt states she is thinking about staying with her daughter at discharge. CAMRON explained the potential for a TCU placement. Pt reports if it is something she needs to do, she will do it.  Patient plan of care discussed at interdisciplinary rounds: Yes    Anticipated Discharge Disposition: Home, Home Care, Transitional Care  Disposition Comments: Discuss the potential discharge planning for TCU or home with daughter  Anticipated Discharge Services: Other (see comment)(Therapy)  Anticipated Discharge DME: None    Patient/family educated on Medicare website which has current facility and service quality ratings:    Education Provided on the Discharge Plan:    Patient/Family in Agreement with the Plan:      Referrals Placed by CM/SW: Internal Clinic Care Coordination, Post Acute Facilities, Transportation, Communication hand-offs to next level of Care Providers  Private pay costs discussed: Not applicable    Additional Information:  CAMRON consulted with RNCC and hospitalist. Hospitalist mentioned there was potential for pt to discharge home with her daughter on Friday. RNCC had looked into pt possibly attending [facility] because they have taken pts on chemo and radition from station 88. CAMRON sent referral to [facility] via DOD. CAMRON placed call to pt's daughter, La Nena. CAMRON discussed the referral with La Nena. La Nena  Wanted to know what the visitor policy was at [facility]. CAMRON stated they were unsure, but could look into it. La Nena also inquired if herself and other family could transport pt to chemo/radiation appointments. CAMRON reported they would have to call and inquire. CAMRON left a VM for Rena in admissions at [facility]. SW to follow up with La Nena once information is obtained. CAMRON received notification that pt has been declined by [facility]. CAMRON called Rena and left a VM inquiring about the decline.     VM from  Rena reporting [facility] declined pt due to not having a bed. Rena also reports they could only take pt with their current chemo/radiation plan if the pt was receiving treatment via the [facility]. Rena reports it has to do with pt's medicare stay.       DAYNA Caban

## 2021-04-14 NOTE — PLAN OF CARE
OT: OT treatment session deferred this am, as pt had just finished with PT session where she had significant O2 desaturations and did not tolerate activity well enough for a second therapy session this morning per advice of PT. Will reschedule. Updated RN.

## 2021-04-14 NOTE — PROGRESS NOTES
03/16/21 1100   Quick Adds   Quick Adds Certification   Type of Visit Initial Outpatient Occupational Therapy Evaluation   General Information   Start Of Care Date 03/16/21   Referring Physician Dr. Jl Rosenbaum   Orders Evaluate and treat as indicated   Orders Date 03/08/21   Medical Diagnosis Cognitive-deficits, s/p craniotomy, glioblastoma    Onset of Illness/Injury or Date of Surgery 02/17/21   Surgical/Medical History Reviewed Yes   Additional Occupational Profile Info/Pertinent History of Current Problem Olga Bailey is a 75 year old woman with past medical history of depression, anxiety, asthma, MARCELLE, benign meningioma (2012) who presented with several weeks of word finding/ difficulties expressing self, changes in hand writing MRI revealed left lateral frontoparietal mass with edema she was advised to present to ED was admitted to [facility] 2/17/21 underwent left parietal craniotomy and tumor resection 2/23/21.  Admitted to acute rehab 3/1/21 and discharged home on 3/8/21 with orders for SLP, PT and OT evaluation. Patient reports impairments with balance,motor planning, fastening her bra/spatial awareness, not able to cook or do finances, difficulties wword retrieval, writing, reading comprehension, memory and concentration/reasoning.    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   Current Living Environment House   Prior Level - Transfers Independent   Prior Level - Ambulation Independent   Prior  Level - ADLS Independent   Prior Responsibilities - IADL Meal Preparation;Housekeeping;Laundry;Shopping;Medication management;Finances;Driving;  (volunteer work)   Prior Level Comments Ms. Bailey was independent with all daily living activities prior to this event including living alone, working, managing personal finances and driving.  She is currently staying with her daughter and her family since discharge from hospital.   Patient/family Goals Statement Patient would like to be able to cook, use her computer, do crafts, writeout cards/letters and to be able to do her personal finances again.   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     Fall Risk Screen   Fall screen completed by PT   Have you fallen 2 or more times in the past year? Yes   Have you fallen and had an injury in the past year? No   Timed Up and Go score (seconds) NT . see VALERIE/Brianda   Is patient a fall risk? Yes   Fall screen comments according to FELAI/Lamar.    Abuse Screen (yes response referral indicated)   Feels Unsafe at Home or Work/School no   Feels Threatened by Someone no   Does Anyone Try to Keep You From Having Contact with Others or Doing Things Outside Your Home? no   Physical Signs of Abuse Present no   Cognitive Status Examination   Orientation Orientation to person, place and time   Level of Consciousness Alert   Follows Commands and Answers Questions Able to follow single-step instructions;100% of the time;Able to follow multistep instructions;75% of the time   Personal Safety and Judgment Intact   Memory Impaired   Memory Comments Pt noting very mild difficulty with sequencing higher level cognitive tasks, feeling a bit foggy. Administered Carroll Cognitive Assessment (MoCA)  8.1 ( designed as a rapid screening instrument for mild cognitive dysfunction.  It assesses different cognitive domains: attention and concentration,  executive functions, memory, language, visuoconstructional skills, conceptual thinking, calculations, and orientation.  The total possible score is 30 points; a score of 26 or above is considered normal. ) Pt scores  19/30 (MIS = 7/15) and norm score being >26/30 indicating WNL.  Pt with most noted difficulty with delayed recall, requiring no cues for 2/5 and word choice cue for 1/5 words. Pt was able to do clock contour, represents 1-12 numbers ( some reversals, rewrites/great effort but adequate spacing/planning) unable to place hands.  Alternating pathway/executive function was very challenging but correct.  Pt does well w animal naming and making abstractions but slower with anguage fluency. Aphasia affecting recall of writing number sequence, recalling numbers forward/backward and dleayed recall of words,attention during letter name tapping task (faded in/out of recall of 'tap on A')   Attention Quiet environment required;Selective attention impaired, difficulty ignoring irrelevant stimuli;Alternating attention impaired, difficulty shifting between tasks   Organization/Problem Solving Reports problems with organization;Reports problems with problem solving during evaluation;Problem solving impaired   Executive Function Working memory impaired, decreased storage of information for performing tasks   Cognitive Comment Aphasia is affecting numerical problem solving, storage/retreival and working memory tasks.  To be further assessed with CAM and functional cooking tasks.   Visual Perception   Visual Perception Wears glasses   Visual Acuity Pt denies blurring   Visual Field To be assessed with periometer, but pt does have bruising on R dorsal hand.     Visual Attention to be assessed further with Dynavision, scan course   Oculomotor tracking WNL all 4 quadrants.  Horizontal saccades WNL Vertical very difficult, under and over shooting.  NPC at 12 inches and recovers at 14 cm.  Pt surprised to have some visual  changes noted.     Visual Perception Comments Unable to do design copy on MOCA.     Sensation   Sensation Comments Denies any numbness or tingling in hands/feet.   Posture   Posture Not impaired   Range of Motion (ROM)   ROM Comments WNL for BUE.   Strength   Strength Comments MMT for B SH, EL planes 5-/5y, good muscle balance.   Hand Strength   Hand Dominance Right   Left Hand  (pounds) 40 pounds   Right Hand  (pounds) 35 pounds   Left Lateral Pinch (pounds) 9 pounds   Right Lateral Pinch (pounds) 12 pounds   Left Three Point Pinch (pounds) 10 pounds   Right Three Point Pinch (pounds) 9 pounds   Hand Strength Comments Pt's strength is .5 SD BNL with history of B hand OA.     Coordination   Upper Extremity Coordination Right UE impaired   Gross Motor Coordination Increased time needed for laternting nose finger nose, pauses ebtween direction changes for motor planning No dysmetria ordyskinesis noted.  Slow with  disdiadokinesis motor planning, large marked slow movements ( taking 3x/longer to complete).     Fine Motor Coordination Alternating finger taps very challenging   Functional Limitations Decreased speed;Fine motor ADL performance impaired;Impaired ability to perform bilateral tasks   Coordination Comments Shanique is shaky, does well with toothbrushing as she is using a pwer brush.   Functional Mobility   Ambulation independent , no device   Transfer Skill   Level of [place]: Transfers independent   Transfer Skill Comments SBA on stairs, uneven surfaes, getting in out of car and tub.   Bathing   Level of [place] - Bathing stand-by assist   Bathing Comments SBA transfer   Upper Body Dressing   Level of [place]: Dress Upper Body minimum assist (75% patients effort)   Upper Body Dressing Comments gets bra turned around, hard to alireza (I) undert time constraints.     Lower Body Dressing   Level of [place]: Dress Lower Body independent   Lower Body Dressing Comments takes extra time to  motor plan/visual spatial awareness for planning underwear/pants and socks layers.   Toileting   Level of [place]: Toilet independent   Grooming   Level of [place]: Grooming independent   Eating/Self-Feeding   Level of [place]: Eating independent   Activity Tolerance   Activity Tolerance pt tires quickly, standing for 10-15 min at a time in the kitchen.     Instrumental Activities of Daily Living Assessment   IADL Quick Adds Meal Planning/Preparation;Home/Financial/Management;Communication/Computer Use;Community Mobility;Care of Others   Meal Planning/Preparation family is asisting, told to supervise int he kitchen.  Pt was able to micorwave her own tea water.     Home/Financial Management family is assisting, unable to caulate, interpret bills.   Communication/Computer Use Not hugely motivated by technology but has forgotten steps of accessinge emilas, voice mails setting alarms and texting.     Community Mobility relying on friends and family for driving.  Would love to be able to drive if possible.   Care of Others med mgmt-- Pt notes the pill bars get confusing in the vertical rows (vertical saccades also challenging). Is requiring 24/7 supervision per fall precautions, gets stuck with tasks, supervision for meds/finances and tub/stairs safety, may forget her limitations   Planned Therapy Interventions   Planned Therapy Interventions ADL training;IADL training;Cognitive skills;Coordination training;Manual therapy;Neuromuscular re-education;Visual perception;Strengthening;Stretching;Therapeutic activities;Self care/Home management;ROM;Cognitive performance testing   Adult OT Eval Goals   OT Eval Goals (Adult) 1;2;3;4;5;6;7;8    OT Goal 1   Goal Identifier FACIT   Goal Description Pt will demonstrate independent recall and application of 3 energy conservation modifications/strategies, as applied to daily home routines, with improved scores on the FACIT by 5 points.   Target Date 06/14/21    OT Goal  2   Goal Identifier Testing Results   Goal Description Patient and family to verbalize understanding of  IADL screening, Cognitive Performance Test, and Cognitive Assessment of [place] results and identify 3 strategies to increase patient's safety and independence in the home and community setting.   Target Date 06/14/21    OT Goal 3   Goal Identifier Money Management   Goal Description Patient will demonstrate the ability to complete simple to moderately complex money management tasks with 90% accuracy for increased attention to detail and problem solving skills to resume finances at home and manage money in the community.   Target Date 06/14/21   OT Goal 4   Goal Identifier Visual Perception   Goal Description Pt to demonstrate improved visual perception as needed to don bra, lower body clothes, copying shapes/deisgn and represent time on clocks with 2/5 effort.   Target Date 06/14/21   OT Goal 5   Goal Identifier Meal Prep   Goal Description Patient to complete a new, simple to moderately complex meal prep task using visual recipes as needed, using stove and/or oven with modified independence, for increased safety and independence in the home.   Target Date 06/14/21    OT Goal 6   Goal Identifier Medication Set Up   Goal Description Patient will independently set up their medications, using a pillbox, on 2 separate occasions for increased accuracy and independence with managing medications.   Target Date 06/14/21   OT Goal 7   Goal Identifier STM   Goal Description Patient will demonstrate improved memory skills by completing short-term memory tasks in occupational therapy with 85-90% accuracy using compensatory strategies for increased safety and independence with ADL/IADLS (remembering to take medications, turn off the stove when done, etc.).   Target Date 06/14/21    OT Goal 8   Goal Identifier GMC/FMC//Pinch   Goal Description Patient will demonstrate improved  by 5# in B hands, pinch by 3# and motor  planning skills (dysdiadokinesis x 20 cycles and 9 hole peg to 30 sec) as needed to support dressing, household management and needle work tool use.   Target Date 06/14/21   Clinical Impression   Criteria for Skilled Therapeutic Interventions Met Yes, treatment indicated   OT Diagnosis decreased ADL/IADL   Influenced by the following impairments decreased working memory/storage/retrieval, task sequencing,  convergence insufficiency, visual perception, design copy, numeric relationships, fine motor coordination, gross motor coordination, direction following., fatigue.   Assessment of Occupational Performance 5 or more Performance Deficits   Identified Performance Deficits dressing, cooking, shopping, financial management, medication mgmt, daily routine endurance, safe community mobility, pathfinding.  .     Clinical Decision Making (Complexity) Moderate complexity   Therapy Frequency 2x/week x 12 weeks   Risks and Benefits of Treatment have been explained. Yes   Patient, Family & other staff in agreement with plan of care Yes   Education Assessment   Barriers To Learning No Barriers  (expressive, receptive aphsia.)   Preferred Learning Style Listening;Reading;Demonstration;Pictures/video   Therapy Certification   Certification date from 03/16/21   Certification date to 06/14/21   Certification I certify the need for these services furnished under this plan of treatment and while under my care.  (Physician co-signature of this document indicates review and certification of the therapy plan)   Total Evaluation Time   OT Eval, Moderate Complexity Minutes (82180) 35     Thank you for this thoughtful referral! If you have any questions, please call me at [phone].  Nice to share in this patient's care with you.    Sincerely,   Sara Khalil, OTCARLOS/joana

## 2021-04-15 LAB
ANION GAP SERPL CALCULATED.3IONS-SCNC: 8 MMOL/L (ref 3–14)
BLD PROD TYP BPU: NORMAL
BLD PROD TYP BPU: NORMAL
BLD UNIT ID BPU: 0
BLD UNIT ID BPU: 0
BLOOD PRODUCT CODE: NORMAL
BLOOD PRODUCT CODE: NORMAL
BPU ID: NORMAL
BPU ID: NORMAL
BUN SERPL-MCNC: 40 MG/DL (ref 7–30)
CALCIUM SERPL-MCNC: 7.5 MG/DL (ref 8.5–10.1)
CHLORIDE SERPL-SCNC: 99 MMOL/L (ref 94–109)
CO2 SERPL-SCNC: 21 MMOL/L (ref 20–32)
CREAT SERPL-MCNC: 0.64 MG/DL (ref 0.52–1.04)
ERYTHROCYTE [DISTWIDTH] IN BLOOD BY AUTOMATED COUNT: 16.5 % (ref 10–15)
GFR SERPL CREATININE-BSD FRML MDRD: 87 ML/MIN/{1.73_M2}
GLUCOSE BLDC GLUCOMTR-MCNC: 153 MG/DL (ref 70–99)
GLUCOSE BLDC GLUCOMTR-MCNC: 185 MG/DL (ref 70–99)
GLUCOSE BLDC GLUCOMTR-MCNC: 194 MG/DL (ref 70–99)
GLUCOSE BLDC GLUCOMTR-MCNC: 228 MG/DL (ref 70–99)
GLUCOSE BLDC GLUCOMTR-MCNC: 241 MG/DL (ref 70–99)
GLUCOSE SERPL-MCNC: 289 MG/DL (ref 70–99)
HCT VFR BLD AUTO: 25.2 % (ref 35–47)
HGB BLD-MCNC: 7.3 G/DL (ref 11.7–15.7)
HGB BLD-MCNC: 8.2 G/DL (ref 11.7–15.7)
HGB BLD-MCNC: 8.4 G/DL (ref 11.7–15.7)
LACTATE BLD-SCNC: 2 MMOL/L (ref 0.7–2)
LACTATE BLD-SCNC: 4 MMOL/L (ref 0.7–2)
MCH RBC QN AUTO: 28.1 PG (ref 26.5–33)
MCHC RBC AUTO-ENTMCNC: 32.5 G/DL (ref 31.5–36.5)
MCV RBC AUTO: 86 FL (ref 78–100)
PLATELET # BLD AUTO: 118 10E9/L (ref 150–450)
POTASSIUM SERPL-SCNC: 5.3 MMOL/L (ref 3.4–5.3)
RBC # BLD AUTO: 2.92 10E12/L (ref 3.8–5.2)
SODIUM SERPL-SCNC: 128 MMOL/L (ref 133–144)
TRANSFUSION STATUS PATIENT QL: NORMAL
WBC # BLD AUTO: 15.3 10E9/L (ref 4–11)

## 2021-04-15 PROCEDURE — 80048 BASIC METABOLIC PNL TOTAL CA: CPT | Performed by: INTERNAL MEDICINE

## 2021-04-15 PROCEDURE — 250N000012 HC RX MED GY IP 250 OP 636 PS 637: Performed by: INTERNAL MEDICINE

## 2021-04-15 PROCEDURE — 258N000003 HC RX IP 258 OP 636: Performed by: INTERNAL MEDICINE

## 2021-04-15 PROCEDURE — P9016 RBC LEUKOCYTES REDUCED: HCPCS | Performed by: NURSE PRACTITIONER

## 2021-04-15 PROCEDURE — 85027 COMPLETE CBC AUTOMATED: CPT | Performed by: INTERNAL MEDICINE

## 2021-04-15 PROCEDURE — 250N000009 HC RX 250: Performed by: HOSPITALIST

## 2021-04-15 PROCEDURE — 85018 HEMOGLOBIN: CPT | Performed by: INTERNAL MEDICINE

## 2021-04-15 PROCEDURE — 999N000190 HC STATISTIC VAT ROUNDS

## 2021-04-15 PROCEDURE — 250N000013 HC RX MED GY IP 250 OP 250 PS 637: Performed by: HOSPITALIST

## 2021-04-15 PROCEDURE — 94640 AIRWAY INHALATION TREATMENT: CPT

## 2021-04-15 PROCEDURE — 250N000011 HC RX IP 250 OP 636: Performed by: PSYCHIATRY & NEUROLOGY

## 2021-04-15 PROCEDURE — 99232 SBSQ HOSP IP/OBS MODERATE 35: CPT | Performed by: INTERNAL MEDICINE

## 2021-04-15 PROCEDURE — 250N000011 HC RX IP 250 OP 636: Performed by: HOSPITALIST

## 2021-04-15 PROCEDURE — 250N000011 HC RX IP 250 OP 636: Performed by: INTERNAL MEDICINE

## 2021-04-15 PROCEDURE — 120N000013 HC R&B IMCU

## 2021-04-15 PROCEDURE — 999N001017 HC STATISTIC GLUCOSE BY METER IP

## 2021-04-15 PROCEDURE — 83605 ASSAY OF LACTIC ACID: CPT | Performed by: INTERNAL MEDICINE

## 2021-04-15 RX ORDER — HEPARIN SODIUM 200 [USP'U]/100ML
1 INJECTION, SOLUTION INTRAVENOUS CONTINUOUS PRN
Status: DISCONTINUED | OUTPATIENT
Start: 2021-04-16 | End: 2021-04-16

## 2021-04-15 RX ORDER — DEXTROSE MONOHYDRATE 25 G/50ML
25-50 INJECTION, SOLUTION INTRAVENOUS
Status: DISCONTINUED | OUTPATIENT
Start: 2021-04-15 | End: 2021-04-26 | Stop reason: HOSPADM

## 2021-04-15 RX ORDER — NICOTINE POLACRILEX 4 MG
15-30 LOZENGE BUCCAL
Status: DISCONTINUED | OUTPATIENT
Start: 2021-04-15 | End: 2021-04-26 | Stop reason: HOSPADM

## 2021-04-15 RX ORDER — PREDNISONE 20 MG/1
20 TABLET ORAL DAILY
Status: DISCONTINUED | OUTPATIENT
Start: 2021-04-16 | End: 2021-04-17

## 2021-04-15 RX ORDER — PIPERACILLIN SODIUM, TAZOBACTAM SODIUM 3; .375 G/15ML; G/15ML
3.38 INJECTION, POWDER, LYOPHILIZED, FOR SOLUTION INTRAVENOUS EVERY 6 HOURS
Status: DISCONTINUED | OUTPATIENT
Start: 2021-04-15 | End: 2021-04-16

## 2021-04-15 RX ADMIN — SULFAMETHOXAZOLE AND TRIMETHOPRIM 320 MG: 80; 16 INJECTION, SOLUTION, CONCENTRATE INTRAVENOUS at 12:24

## 2021-04-15 RX ADMIN — SULFAMETHOXAZOLE AND TRIMETHOPRIM 320 MG: 80; 16 INJECTION, SOLUTION, CONCENTRATE INTRAVENOUS at 04:32

## 2021-04-15 RX ADMIN — PIPERACILLIN SODIUM AND TAZOBACTAM SODIUM 3.38 G: 3; .375 INJECTION, POWDER, LYOPHILIZED, FOR SOLUTION INTRAVENOUS at 20:20

## 2021-04-15 RX ADMIN — HYDROMORPHONE HYDROCHLORIDE 0.2 MG: 0.2 INJECTION, SOLUTION INTRAMUSCULAR; INTRAVENOUS; SUBCUTANEOUS at 00:09

## 2021-04-15 RX ADMIN — ACYCLOVIR: 50 OINTMENT TOPICAL at 05:29

## 2021-04-15 RX ADMIN — ACYCLOVIR: 50 OINTMENT TOPICAL at 00:33

## 2021-04-15 RX ADMIN — SODIUM CHLORIDE: 9 INJECTION, SOLUTION INTRAVENOUS at 17:10

## 2021-04-15 RX ADMIN — PIPERACILLIN SODIUM AND TAZOBACTAM SODIUM 3.38 G: 3; .375 INJECTION, POWDER, LYOPHILIZED, FOR SOLUTION INTRAVENOUS at 14:30

## 2021-04-15 RX ADMIN — HYDROMORPHONE HYDROCHLORIDE 0.2 MG: 0.2 INJECTION, SOLUTION INTRAMUSCULAR; INTRAVENOUS; SUBCUTANEOUS at 10:35

## 2021-04-15 RX ADMIN — BUSPIRONE HYDROCHLORIDE 30 MG: 15 TABLET ORAL at 20:35

## 2021-04-15 RX ADMIN — Medication 12.5 MG: at 20:35

## 2021-04-15 RX ADMIN — ACYCLOVIR: 50 OINTMENT TOPICAL at 03:30

## 2021-04-15 RX ADMIN — INSULIN ASPART 1 UNITS: 100 INJECTION, SOLUTION INTRAVENOUS; SUBCUTANEOUS at 18:31

## 2021-04-15 RX ADMIN — ONDANSETRON 4 MG: 2 INJECTION INTRAMUSCULAR; INTRAVENOUS at 00:09

## 2021-04-15 RX ADMIN — ONDANSETRON 4 MG: 2 INJECTION INTRAMUSCULAR; INTRAVENOUS at 17:27

## 2021-04-15 RX ADMIN — HYDROMORPHONE HYDROCHLORIDE 0.2 MG: 0.2 INJECTION, SOLUTION INTRAMUSCULAR; INTRAVENOUS; SUBCUTANEOUS at 08:22

## 2021-04-15 RX ADMIN — LEVETIRACETAM 500 MG: 5 INJECTION, SOLUTION INTRAVENOUS at 22:39

## 2021-04-15 RX ADMIN — INSULIN ASPART 3 UNITS: 100 INJECTION, SOLUTION INTRAVENOUS; SUBCUTANEOUS at 14:41

## 2021-04-15 RX ADMIN — SULFAMETHOXAZOLE AND TRIMETHOPRIM 320 MG: 80; 16 INJECTION, SOLUTION, CONCENTRATE INTRAVENOUS at 22:32

## 2021-04-15 RX ADMIN — PIPERACILLIN SODIUM AND TAZOBACTAM SODIUM 3.38 G: 3; .375 INJECTION, POWDER, LYOPHILIZED, FOR SOLUTION INTRAVENOUS at 09:06

## 2021-04-15 RX ADMIN — HYDROMORPHONE HYDROCHLORIDE 0.2 MG: 0.2 INJECTION, SOLUTION INTRAMUSCULAR; INTRAVENOUS; SUBCUTANEOUS at 17:18

## 2021-04-15 RX ADMIN — LEVALBUTEROL HYDROCHLORIDE 1.25 MG: 1.25 SOLUTION, CONCENTRATE RESPIRATORY (INHALATION) at 07:13

## 2021-04-15 RX ADMIN — ACYCLOVIR: 50 OINTMENT TOPICAL at 09:30

## 2021-04-15 RX ADMIN — HYDROMORPHONE HYDROCHLORIDE 0.2 MG: 0.2 INJECTION, SOLUTION INTRAMUSCULAR; INTRAVENOUS; SUBCUTANEOUS at 14:49

## 2021-04-15 RX ADMIN — SODIUM CHLORIDE 500 ML: 9 INJECTION, SOLUTION INTRAVENOUS at 07:58

## 2021-04-15 RX ADMIN — LEVETIRACETAM 500 MG: 5 INJECTION, SOLUTION INTRAVENOUS at 09:22

## 2021-04-15 ASSESSMENT — MIFFLIN-ST. JEOR: SCORE: 1146.13

## 2021-04-15 ASSESSMENT — ACTIVITIES OF DAILY LIVING (ADL)
ADLS_ACUITY_SCORE: 19

## 2021-04-15 NOTE — PROVIDER NOTIFICATION
MD Notification    Notified Person: MD    Notified Person Name:    Notification Date/Time: April 15, 2021, 1:12 PM      Notification Interaction:    Purpose of Notification: HGB: 7.3 (down from 8.2), Daughter concerned about neuro status, is requesting extended EEG. Wants us to contact a neurologist named González [phone]. Blood Glucose 228. Do you want me to correct? Pt not eating.     Orders Received:    Comments:

## 2021-04-15 NOTE — PLAN OF CARE
OT: Attempted to see pt in AM however pt having too much pain for participation. Daughter present and in agreement. RN present and administered pain meds. OT will continue to follow.

## 2021-04-15 NOTE — PROGRESS NOTES
[facility]    Medicine Progress Note - Hospitalist Service       Date of Admission:  3/26/2021  Assessment & Plan       Olga Bailey is a 75 year old female admitted on 3/26/2021.  Past medical history that is most notable for recent craniotomy and resection of glioblastoma multiforme, as well as uncomplicated asthma, who presents with dyspnea and is found to have acute bilateral pneumonia.     Acute hypoxic respiratory failure due to bilateral pneumonia, suspected PCP   -- discussed with ID, all consistent with PCP   -- Bactrim DS 2 tid for 5 more days   -- start weaning Prednisone (on 30 mg daily today)     Leukocytosis --suspect stress related from retroperitonieal bleed, but lactate 4.0 and will add Zosyn until situation improves.      Retroperitoneal bleed 2nd to Lovenox 4/14/2021 AM  Ileus 2nd to bleed  Abd and Right Flank Pain -- 2nd to bleed   -- hgb now appears stabole off lovenox, check hgb at noon with lactate    Bilateral LE DVT, hypercoagulable 2nd to GBM   -- US 3/29 occlusive DVT of bilateral posterior tibial veins and soleal veins    -- off Lovenox (last dose 4/14 AM) -- IR consulted for IVC filter ?tomorrow     Epistaxis   -- now stable, off lovenox     Hyponatremia  -- mild, 2nd to SIADH from stress and pulm process     Steroid induced hyperglycemia -- resolved   -- restart insulin as need     Urinary incontinence -- has pure wick     Hypokalemia, resolved     GBM s/p resection 2/23/21  Followed by Dr. Baker of Neuro-Oncology.  She is in process of being set up to start chemotherapy and XRT next week.   Appears to have been on prolonged steroids following neurosurgery as was discharged without plan for taper and initiated taper only recently in follow up with Dr. Baker.  - now on prednisone as above with plan for prolonged taper  - has not yet initiated chemo (temozolomide) and planned radiation  - daughter reporting she had been working with SLP for language/cognition  following her surgery, will order consult  - left message for X- -- can do online 2nd opinion with [place] or talk with Dr. Stephanie Baker     Hypertension  - continue PTA amlodipine     Sinus tachycardia -- clinically improved  - metoprolol 12.5 mg bid.      Asthma  MARCELLE  - nebs prn  - resume CPAP with home setting 4/9     Depression and anxiety  Insomnia  - continue PTA Prozac and Buspar  - continue PTA Atarax for sleep  - prn lorazepam     GERD  - switch to PPI as above     Chronic anemia of chronic disease, with Acute blood loss  Baseline hgb 10-11 g/dL, stable at 8     Non-severe malnutrition  Noted decrease oral intake and drop in albumin, subcutaneous fat loss on exam.   - PICC line in place  - excellent oral intake, tapered off TPN 4/9       Diet: Snacks/Supplements Adult: Boost Shake; Between Meals  Room Service  Advance Diet as Tolerated  NPO for Medical/Clinical Reasons Except for: Meds, Ice Chips    DVT Prophylaxis: Enoxaparin (Lovenox) SQ  Martino Catheter: not present  Code Status: Full Code           Disposition Plan   Here several days, will need IVC filter and resolution of ileus before leaving.       Bonilla Tian MD  Hospitalist Service  [facility]  Contact information available via [facility] Paging/Directory    (35 min total)    ______    Interval History   No flatus, appears uncomfortable, minimal talking -- rates pain 10 out of 10.  Hgb 8.2 after 2nd unit of blood, lactate 4.0 this AM.     Physical Exam   Vital Signs: Temp: 97.7  F (36.5  C) Temp src: Oral BP: 130/65 Pulse: 98   Resp: 15 SpO2: 96 % O2 Device: None (Room air) Oxygen Delivery: 1 LPM  Weight: 150 lbs 5.66 oz    Gen: NAD, pleasant, elderly, frail  Resp: no focal crackles,  no wheezes, no increased work of resp  CV: S1S2 heard, reg rhythm, reg rate  Abdo: soft, nontender, rate bowel swounds , moderately distended  Ext: calves nontender, well perfused  Neuro: Alert but not talking, CN grossly intact,  no facial asymmetry      Data   Recent Labs   Lab 04/15/21  0530 04/14/21  2300 04/14/21  2150 04/14/21  1627 04/14/21  0548 04/13/21  0558   WBC 15.3*  --   --  16.1* 10.2 11.7*   HGB 8.2* 7.2* 7.2* 7.3* 8.1* 8.2*   MCV 86  --   --  90 88 88   *  --   --  187 171 175   *  --   --  130*  --  130*   POTASSIUM 5.3  --   --  5.3  --  4.3   CHLORIDE 99  --   --  98  --  97   CO2 21  --   --  25  --  29   BUN 40*  --   --  42*  --  27   CR 0.64  --   --  0.79  --  0.64   ANIONGAP 8  --   --  7  --  4   ESTIVEN 7.5*  --   --  8.8  --  8.4*   *  --   --  240*  --  74   ALBUMIN  --   --   --  2.6*  --   --    PROTTOTAL  --   --   --  5.4*  --   --    BILITOTAL  --   --   --  0.2  --   --    ALKPHOS  --   --   --  88  --   --    ALT  --   --   --  61*  --   --    AST  --   --   --  22  --   --    TROPI  --   --  0.021 <0.015  --   --      Recent Results (from the past 24 hour(s))   CT Head w/o Contrast    Narrative    CT SCAN OF THE HEAD WITHOUT CONTRAST   4/14/2021 3:38 PM     HISTORY: Seizure, abnormal neuro exam; syncope, history of GBM post-  resection.    TECHNIQUE:  Axial images of the head and coronal reformations without  IV contrast material. Radiation dose for this scan was reduced using  automated exposure control, adjustment of the mA and/or kV according  to patient size, or iterative reconstruction technique.    COMPARISON: MRI brain dated 2/25/2021.    FINDINGS: Again seen are post-surgical changes status post left  frontal craniotomy for resection of a previously demonstrated  enhancing mass centered in the left postcentral gyrus/supramarginal  gyrus. There is encephalomalacia corresponding to the previously seen  resection cavity. Previously seen mild local mass effect in the left  cerebral hemisphere has resolved in the interim.    Redemonstrated extensive nonspecific hypoattenuation throughout the  subcortical and deep/periventricular white matter is again noted.  Small calcified extra-axial  mass overlying the lateral right occipital  lobe measuring 5 mm x 9 mm in the axial plane is unchanged and most  consistent with a meningioma. The ventricles are normal in size and  configuration. There is no evidence for acute intracranial hemorrhage,  extra-axial fluid collection or significant mass effect/herniation.  Mild generalized brain parenchymal volume loss.    The orbits appear within normal limits, accounting for limitations of  technique. The paranasal sinuses are free of significant disease. The  mastoid and middle ear cavities are clear. Left temporomandibular  joint degenerative changes. Prominent degenerative changes involving  the upper cervical spine are noted, particularly severe involving the  left lateral atlantooccipital joint.      Impression    IMPRESSION:  1. No definite CT findings of acute intracranial abnormality.  2. Redemonstrated findings status post left-sided craniotomy for  resection of a previously demonstrated malignant tumor centered in the  left lateral postcentral/supramarginal gyri, as described. Continued  MR follow-up/monitoring is suggested.  3. No significant change in extensive nonspecific hypoattenuation  throughout the cerebral white matter.  4. Unchanged small calcified dural based mass overlying the right  occipital lobe, likely a meningioma.    NETTE LEON MD   CT Abdomen Pelvis w/o Contrast    Narrative    CT ABDOMEN PELVIS W/O CONTRAST 4/14/2021 3:38 PM    CLINICAL HISTORY: Abdominal pain, acute, nonlocalized  TECHNIQUE: CT scan of the abdomen and pelvis was performed without IV  contrast. Multiplanar reformats were obtained. Dose reduction  techniques were used.  CONTRAST: None.    COMPARISON: 2/18/2021    FINDINGS:   LOWER CHEST: Intralobular septal thickening and reticular opacities in  the lower lobes bilaterally are new and could represent edema,  infiltrate or developing fibrosis.    HEPATOBILIARY: Normal.    PANCREAS: Normal.    SPLEEN:  Normal.    ADRENAL GLANDS: Normal.    KIDNEYS/BLADDER: Normal.    BOWEL: Small hiatal hernia. Normal caliber loops of small bowel and  colon. Sigmoid diverticular cyst. No inflammatory changes.    LYMPH NODES: No lymphadenopathy.    VASCULATURE: No abdominal aortic aneurysm.    PELVIC ORGANS: Hysterectomy..    OTHER: Large right retroperitoneal fluid collection with layering  fluid fluid levels measuring 10.0 x 7.8 x 10.0 cm (series 2, image  91). Left rectus sheath hematoma with hyperdensity and thickening of  the left rectus sheath measuring 5.5 x 2.5 x 7.6 cm (cc, and 157 and  series 5, image 14). Soft tissue nodules in the subcutaneous fat of  the anterior abdominal wall related to subcutaneous injections. No  pelvic free fluid. No extraluminal air.    MUSCULOSKELETAL: Degenerative changes in the hips and spine. No  suspicious lesions in the bones.      Impression    IMPRESSION:   1.  Large right retroperitoneal hematoma and small left rectus sheath  hematoma.  2.  Linear subpleural and interstitial opacities in the lung bases  either infection, edema or developing fibrosis.    ELIANA PALACIOS MD

## 2021-04-15 NOTE — PROGRESS NOTES
"[facility]    Infectious Disease Progress Note    Date of Service (when I saw the patient): 04/15/2021     Assessment & Plan   Olga Bailey is a 75 year old female who was admitted on 3/26/2021.     Impression:  1. 75 y.o with recent diagnosis of craniotomy and resection of glioblastoma multiforme.   2. Has been on very high dose steroid taper for the past month PTA  since the craniotomy. Not on any bactrim prophylaxis before admission.    3. Admitted with shortness of breath.   4. Found to have bilateral ground glass infiltrates on the imaging, very hypoxic. Concern for PJP. Never able to give sputum for sample. COVID PCR negative x 3.   5. COVID PCR negative x 2.   6. No leucocytosis, procal not elevated.      Recommendations:   New RRT last evening noted, possible syncopal event discussed with NP running the RRT, noted CT abdomen with new retroperitoneal bleed, was started on zosyn for leucocytosis and lactic acidosis, procal negative, which could be because of the bleed and the steroids, none the less no objection to it as we clarify the situation.     High suspicion for PJP pneumonia. LDH high, high beta d glucan unable to confirm with the sputum studies as patient has been unable to provide any sputum   Steroids for PJP continue to taper to be off steroids in next few days. Discussed with Im who also ran the course by neuro-onc.  Day 17/21- 26 septra was improving very well before this past 24- 36 hours with new bleed            Per neuro onc last note her cancer related plan was as listed in the note below, I would recommend follow up with Neuro onc after discharge.    I am copying and pasting Neuro onc last note below:   \" PLAN  -CANCER-DIRECTED THERAPY-  Will initiate chemoradiotherapy with temozolomide 75mg/m2 (140mg).   -Instructed to start temozolomide the evening before the start date of radiation and continue daily until the completion of radiation.   -Take temozolomide " "on an empty stomach, 30 minutes after Zofran dosing.  -Additional temozolomide teaching performed by RN/ pharmacy staff.      -Initial labs ordered; CBC with differential, CMP, Hepatitis B serologies; NR.  -Will continue weekly CBC and repeat CMP at follow-up.     -Medications prescribed;        -Zofran 4mg (1 to 2 tabs qHS 30 minutes prior to chemotherapy and then PRN nausea).       -For lymphopenia, prophylactic antibiotics are recommended during concurrent treatment. Will start Sulfamethoxazole-trimethoprim (Bactrim) 800 mg-160 mg; 1 tab orally Monday, Wednesday, and Friday for pneumocystis prophylaxis. If thrombocytopenia becomes an issue, can change to Dapsone, Atovaquone, or Pentamidine.        -Docusate 100 mg; 1 cap orally 3 times a day (stool softener for constipation)       -Senna 8.6 m tabs orally at bedtime (laxative as needed for constipation)     -STEROIDS-  -Continue to wean dexamethasone as tolerated;                           4mg daily ( - 3/28)                          2mg daily (3/29 - )                          2mg on , , , then stop.   -Dose may increase while undergoing radiation.\"          Robyn Branham MD    Interval History   RRT last evening: Spoke with Jailyn Hubbard who was called on rapid response -- patient was up to the bathroom after enema, bowels moved twice per nurse, then started jerking and less responsive, but nurse was still able to communicate with her, but more lethargic    Physical Exam   Temp: 97.3  F (36.3  C) Temp src: Axillary BP: 130/65 Pulse: 98   Resp: 15 SpO2: 96 % O2 Device: None (Room air) Oxygen Delivery: 1 LPM  Vitals:    21 0700 21 0656 04/15/21 0600   Weight: 68.9 kg (151 lb 14.4 oz) 63.8 kg (140 lb 10.5 oz) 68.2 kg (150 lb 5.7 oz)     Vital Signs with Ranges  Temp:  [97.3  F (36.3  C)-98.7  F (37.1  C)] 97.3  F (36.3  C)  Pulse:  [] 98  Resp:  [15-46] 15  BP: (100-142)/() 130/65  SpO2:  [93 %-100 %] 96 " %    Constitutional: Awake, alert  Lungs: diminished bilateral   Cardiovascular: Regular rate and rhythm, normal S1 and S2, and no murmur noted  Abdomen: Normal bowel sounds, soft, non-distended, non-tender  Skin: No rashes, no cyanosis, no edema  Other:    Medications     sodium chloride 75 mL/hr at 04/14/21 2351       amLODIPine  10 mg Oral Daily     busPIRone HCl  30 mg Oral BID     [Held by provider] enoxaparin ANTICOAGULANT  70 mg Subcutaneous Q12H     FLUoxetine  80 mg Oral Daily     insulin aspart  1-7 Units Subcutaneous TID AC     insulin aspart  1-5 Units Subcutaneous At Bedtime     levalbuterol  1.25 mg Nebulization Q4H While awake     levETIRAcetam  500 mg Intravenous Q12H     metoprolol tartrate  12.5 mg Oral BID     nystatin  500,000 Units Swish & Spit 4x Daily     pantoprazole  40 mg Oral BID AC     piperacillin-tazobactam  3.375 g Intravenous Q6H     predniSONE  30 mg Oral Daily     senna-docusate  2 tablet Oral BID    Or     senna-docusate  2 tablet Oral BID     sodium chloride (PF)  3 mL Intracatheter Q8H     sodium chloride (PF)  3 mL Intracatheter Q8H     sulfamethoxazole-trimethoprim  320 mg Intravenous Q8H       Data   All microbiology laboratory data reviewed.  Recent Labs   Lab Test 04/15/21  0530 04/14/21  2300 04/14/21  2150 04/14/21  1627 04/14/21  0548   WBC 15.3*  --   --  16.1* 10.2   HGB 8.2* 7.2* 7.2* 7.3* 8.1*   HCT 25.2*  --   --  22.6* 24.6*   MCV 86  --   --  90 88   *  --   --  187 171     Recent Labs   Lab Test 04/15/21  0530 04/14/21  1627 04/13/21  0558   CR 0.64 0.79 0.64     No lab results found.  Recent Labs   Lab Test 03/29/21 1951 03/29/21  1946   CULT No growth No growth       Attestation:  Total time on the floor involved in the patient's care: 35 minutes. Total time spent in counseling/care coordination: >50%
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
## 2021-04-15 NOTE — PROGRESS NOTES
Sepsis Evaluation Progress Note    I was called to see Olga Bailey due to follow up scheduled lab. She is not known to have an infection.     Physical Exam   Vital Signs:  Temp: 97.7  F (36.5  C) Temp src: Oral BP: (!) 121/107 Pulse: 90   Resp: 21 SpO2: 95 % O2 Device: Nasal cannula Oxygen Delivery: 1 LPM     General: chronically ill appearing  Mental Status: at her baseline of sleepy but oriented x4. No focal deficits.     Data   Lactic Acid   Date Value Ref Range Status   04/15/2021 4.0 (HH) 0.7 - 2.0 mmol/L Final     Comment:     Critical Value called to and read back by  TACHO HULL ON 33 AT 0543 BY II     04/14/2021 2.8 (H) 0.7 - 2.0 mmol/L Final     Lactate for Sepsis Protocol   Date Value Ref Range Status   04/05/2021 2.4 (H) 0.7 - 2.0 mmol/L Final     Comment:     Significant value called to and read back by  TEVIN BRYANT IN 33 AT 1738 MA     04/04/2021 1.9 0.7 - 2.0 mmol/L Final       Assessment & Plan   NO EVIDENCE OF SEPSIS at this time.  Vital sign, physical exam, and lab findings are likely due to acute blood loss.    Disposition: The patient will remain on the current unit. We will continue to monitor this patient closely.. AM rounding to address retroperitoneal and rectus sheath bleed.  Leila Brooke, KAYCE CNP    Sepsis Criteria   Sepsis: 2+ SIRS criteria due to infection  Severe Sepsis: Sepsis AND 1+ new sign of acute organ dysfunction (Note: lactate >2 or acute encephalopathy each qualify as organ dysfunction)  Septic Shock: Sepsis AND hypotension despite volume resuscitation with 30 ml/kg crystalloid or lactate >=4  Note: HYPOTENSION is defined as 2 BP readings measured 3 hrs apart that have a SBP <90, MAP <65, or decrease >40 mmHg, occurring 6 hrs before or after t-zero

## 2021-04-15 NOTE — PLAN OF CARE
Date/Time: April 15, 2021 5:29 AM   Reason for Admission:SOB    Cognitive/Mentation:x2 ; confused   Neuros/CMS:intact ex tremors in all extremities, R drift ; lethargic  VS:VSS on 1 L O2 B/P: 121/107, T: 97.7, P: 90, R: 21     :purewick in place; incontinent of bowel - no BM on this shift  Pain:dilaudid given x1 for pain  IV: IVF @ 75; R triple lumen PICC  Tele: STach  Drains:n/a  Skin:bruises scattered - abdomen, R forarm  Activity: bedrest  Diet:NPO      Discharge: pending stability    End of shift summary: Received 2 units blood - hgb trending up with last recheck 8.2. Dilaudid given x1 for flank pain. UA sent. RRT called for routine lactic draw - LA 4.0; no new orders, continuing with current plan of care. Bed bath given, new linen. Repo and oral cares q2h. No seizure - like activity overnight; seizure precautions in place. Updated daughter with evens overnight; daughter would like frequent updates throughout shift.

## 2021-04-15 NOTE — PROGRESS NOTES
Care Management Follow Up    Length of Stay (days): 20    Expected Discharge Date: 04/18/21     Concerns to be Addressed: discharge planning  Pt states she is thinking about staying with her daughter at discharge. SW explained the potential for a TCU placement. Pt reports if it is something she needs to do, she will do it.  Patient plan of care discussed at interdisciplinary rounds: Yes    Anticipated Discharge Disposition: Home, Home Care, Transitional Care  Disposition Comments: Discuss the potential discharge planning for TCU or home with daughter  Anticipated Discharge Services: Other (see comment)(Therapy)  Anticipated Discharge DME: None    Patient/family educated on Medicare website which has current facility and service quality ratings:    Education Provided on the Discharge Plan:    Patient/Family in Agreement with the Plan:      Referrals Placed by CM/SW: Internal Clinic Care Coordination, Post Acute Facilities, Transportation, Communication hand-offs to next level of Care Providers  Private pay costs discussed: Not applicable    Additional Information:  SW attempted to see pt because her daughter was present. Pt and daughter were with provider when SW arrived at the room. SW stated they would come back. CAMRON wanted to discuss TCU choices with [place].       DAYNA Caban

## 2021-04-15 NOTE — PROGRESS NOTES
A&O x3. VSS ex tachycardia, 1L NC. Tele NSR/ST. CMS intact. Neuros difficult to assess. Pt able to follow commands but was lethargic. No seizure activity noted this shift. Unclear if lethergy was from medications (dilaudid, keppra) or some other cause. Lungs clear /dim. BS, BM-, flatus-. + N/V (ZOFRAN X1 effective). BGs elevated treated with sliding scale insulin. Pt uses a purewick and is voiding. IV dilaudid for breakthrough right flank pain. Pt refused all oral medications today. HOB elevated and attempted to offer pt sips of her supplement but pt coughed. Pt may require thickened liquids.     Pt received 500cc NS bolus this morning for elevated lactic acid. Pt's HGB dropped from 8.2 to 7.3. 1 unit of RBC transfused. Recheck HGB at 2000.

## 2021-04-15 NOTE — PLAN OF CARE
Pt IMC status, report given to Phillip WEBSTER at 1730. A&Ox4, more lethargic this afternoon. VSS ex tachycardic. On 1L O2 for comfort. Abdominal cramping, suppository and enema given. BM x2. LS clear. CMS intact. Zofran and compazine given for nausea. Ativan given x1. Voiding, pure wick in place. RRT called @ 1500, see note. Bolus started. Plan to given 1 unit RBCs.

## 2021-04-15 NOTE — PROGRESS NOTES
CLINICAL NUTRITION SERVICES - REASSESSMENT NOTE      Future/Additional Recommendations:     Once diet resumed, will continue with the Ensure shakes TID     Malnutrition: (4/5)  % Weight Loss:  None noted  % Intake:  </= 50% for >/= 5 days (severe malnutrition)   Subcutaneous Fat Loss: None noted  Muscle Loss: Mild clavicle wasting, mild deltoid wasting, mild dorsal wasting.  Fluid Retention: +1 BLE      Malnutrition Diagnosis: Non Severe Malnutrition in the context of acute on chronic illness.       EVALUATION OF PROGRESS TOWARD GOALS   Diet:    4/14: NPO    Intake/Tolerance:    Chart reviewed    Pt now NPO  Per MD - Retroperitoneal bleed 2nd to Lovenox 4/14/2021 AM, Ileus 2nd to bleed, Abd and Right Flank Pain -- 2nd to bleed    Pt had been on a 'regular' diet  Being seen daily for meal ordering assistance  Pt expressed liking the Ensure shakes - being sent at [id number]  She does not like the [employer] supplements      NEW FINDINGS:   3/26 (admit): 69.9 kg  4/15: 68.2 kg    4/15: Na 128 (L)           K 5.3    Previous Goals (4/10):   Pt to consume 75% meals and supplements  Evaluation: partially met - taking ~50% meals and all of the supplements    Previous Nutrition Diagnosis (4/10):   No nutrition diagnosis identified at this time  Evaluation: Declining, modified below        CURRENT NUTRITION DIAGNOSIS  Inadequate protein-energy intake related to current diet order as evidenced by pt now NPO    INTERVENTIONS  Recommendations / Nutrition Prescription  NPO (diet per MD)    Implementation  Once diet resumed, will continue with the Ensure shakes TID    Goals  Pt to have diet advanced in the next 48 hrs      MONITORING AND EVALUATION:  Progress towards goals will be monitored and evaluated per protocol and Practice Guidelines

## 2021-04-15 NOTE — PROVIDER NOTIFICATION
Hospitalist Cross Cover     Paged regarding ongoing low Hgb following PRBC transfusion in the setting of known large retroperitoneal hematoma.     hgb 7.3 this morning, pt received one units of red cells, and repeat hgb is 7.2 (repeated to confirm). This suggests on-going bleeding into the retroperitoneal hematoma. She is hemodynamically stable currrenlty.     Will repeat blood transfusion and continue to monitor hbg. May need to re-discuss with IR in the morning.     Ester Aguirre MD

## 2021-04-15 NOTE — PLAN OF CARE
PT attempting to see patient who is fast asleep; daughter present stating that PT would not be appropriate at this time. Will continue to follow.

## 2021-04-15 NOTE — PROGRESS NOTES
MD Notification    Notified Person: MD    Notified Person Name: Dr. Aguirre    Notification Date/Time: 4/14/21 1116    Notification Interaction: text page    Purpose of Notification: Pt received 1 unit blood, recheck hgb 7.2. No orders for transfusion, do you want another unit? Please advise.    Orders Received:    Comments:

## 2021-04-15 NOTE — PLAN OF CARE
[id number]:   Took over patient care due to patient status change to [facility]. Drowsy, sleeping between cares with eyes open which is new per daughter @ bedside, opens eyes and responds appropriately when prompted. VSS, on 1 L O2 for comfort. Tele sinus tach, rate 100s. NPO. Had not taken Bactrim today due to nausea, will change to IV dose tonight per Dr. Rosa.  Severe abdomen pain with turning in bed, IV dilaudid dose x1. Blood transfusion started. 500 ml NS bolus completed.

## 2021-04-16 ENCOUNTER — APPOINTMENT (OUTPATIENT)
Dept: CT IMAGING | Facility: CLINIC | Age: 76
DRG: 166 | End: 2021-04-16
Attending: INTERNAL MEDICINE
Payer: MEDICARE

## 2021-04-16 ENCOUNTER — APPOINTMENT (OUTPATIENT)
Dept: OCCUPATIONAL THERAPY | Facility: CLINIC | Age: 76
DRG: 166 | End: 2021-04-16
Attending: INTERNAL MEDICINE
Payer: MEDICARE

## 2021-04-16 ENCOUNTER — APPOINTMENT (OUTPATIENT)
Dept: INTERVENTIONAL RADIOLOGY/VASCULAR | Facility: CLINIC | Age: 76
DRG: 166 | End: 2021-04-16
Attending: INTERNAL MEDICINE
Payer: MEDICARE

## 2021-04-16 ENCOUNTER — APPOINTMENT (OUTPATIENT)
Dept: ULTRASOUND IMAGING | Facility: CLINIC | Age: 76
DRG: 166 | End: 2021-04-16
Attending: PHYSICIAN ASSISTANT
Payer: MEDICARE

## 2021-04-16 PROBLEM — D69.6 THROMBOCYTOPENIA (H): Status: ACTIVE | Noted: 2021-04-16

## 2021-04-16 LAB
ALBUMIN SERPL-MCNC: 1.9 G/DL (ref 3.4–5)
ALT SERPL W P-5'-P-CCNC: 1463 U/L (ref 0–50)
ANION GAP SERPL CALCULATED.3IONS-SCNC: 8 MMOL/L (ref 3–14)
APTT PPP: 31 SEC (ref 22–37)
AST SERPL W P-5'-P-CCNC: 1013 U/L (ref 0–45)
BILIRUB DIRECT SERPL-MCNC: <0.1 MG/DL (ref 0–0.2)
BILIRUB SERPL-MCNC: 0.2 MG/DL (ref 0.2–1.3)
BLD PROD TYP BPU: NORMAL
BLD UNIT ID BPU: 0
BLOOD PRODUCT CODE: NORMAL
BPU ID: NORMAL
BUN SERPL-MCNC: 37 MG/DL (ref 7–30)
CALCIUM SERPL-MCNC: 7.6 MG/DL (ref 8.5–10.1)
CHLORIDE SERPL-SCNC: 100 MMOL/L (ref 94–109)
CK SERPL-CCNC: 109 U/L (ref 30–225)
CO2 SERPL-SCNC: 21 MMOL/L (ref 20–32)
CREAT SERPL-MCNC: 0.8 MG/DL (ref 0.52–1.04)
ERYTHROCYTE [DISTWIDTH] IN BLOOD BY AUTOMATED COUNT: 17.2 % (ref 10–15)
FIBRINOGEN PPP-MCNC: 476 MG/DL (ref 200–420)
GFR SERPL CREATININE-BSD FRML MDRD: 72 ML/MIN/{1.73_M2}
GLUCOSE BLDC GLUCOMTR-MCNC: 113 MG/DL (ref 70–99)
GLUCOSE BLDC GLUCOMTR-MCNC: 131 MG/DL (ref 70–99)
GLUCOSE BLDC GLUCOMTR-MCNC: 137 MG/DL (ref 70–99)
GLUCOSE BLDC GLUCOMTR-MCNC: 150 MG/DL (ref 70–99)
GLUCOSE BLDC GLUCOMTR-MCNC: 93 MG/DL (ref 70–99)
GLUCOSE SERPL-MCNC: 200 MG/DL (ref 70–99)
HCT VFR BLD AUTO: 20.9 % (ref 35–47)
HGB BLD-MCNC: 6.8 G/DL (ref 11.7–15.7)
HGB BLD-MCNC: 8.3 G/DL (ref 11.7–15.7)
INR PPP: 1.18 (ref 0.86–1.14)
INTERPRETATION ECG - MUSE: NORMAL
MCH RBC QN AUTO: 27.4 PG (ref 26.5–33)
MCHC RBC AUTO-ENTMCNC: 32.5 G/DL (ref 31.5–36.5)
MCV RBC AUTO: 84 FL (ref 78–100)
PLATELET # BLD AUTO: 102 10E9/L (ref 150–450)
POTASSIUM SERPL-SCNC: 4.9 MMOL/L (ref 3.4–5.3)
PROT SERPL-MCNC: 4.2 G/DL (ref 6.8–8.8)
RBC # BLD AUTO: 2.48 10E12/L (ref 3.8–5.2)
SODIUM SERPL-SCNC: 129 MMOL/L (ref 133–144)
TRANSFUSION STATUS PATIENT QL: NORMAL
TRANSFUSION STATUS PATIENT QL: NORMAL
WBC # BLD AUTO: 13.5 10E9/L (ref 4–11)

## 2021-04-16 PROCEDURE — 37191 INS ENDOVAS VENA CAVA FILTR: CPT

## 2021-04-16 PROCEDURE — 93975 VASCULAR STUDY: CPT

## 2021-04-16 PROCEDURE — 84460 ALANINE AMINO (ALT) (SGPT): CPT | Performed by: INTERNAL MEDICINE

## 2021-04-16 PROCEDURE — 85027 COMPLETE CBC AUTOMATED: CPT | Performed by: INTERNAL MEDICINE

## 2021-04-16 PROCEDURE — 120N000013 HC R&B IMCU

## 2021-04-16 PROCEDURE — 82550 ASSAY OF CK (CPK): CPT | Performed by: INTERNAL MEDICINE

## 2021-04-16 PROCEDURE — C1769 GUIDE WIRE: HCPCS

## 2021-04-16 PROCEDURE — 82040 ASSAY OF SERUM ALBUMIN: CPT | Performed by: INTERNAL MEDICINE

## 2021-04-16 PROCEDURE — 80048 BASIC METABOLIC PNL TOTAL CA: CPT | Performed by: INTERNAL MEDICINE

## 2021-04-16 PROCEDURE — 06HY3DZ INSERTION OF INTRALUMINAL DEVICE INTO LOWER VEIN, PERCUTANEOUS APPROACH: ICD-10-PCS | Performed by: INTERNAL MEDICINE

## 2021-04-16 PROCEDURE — 250N000009 HC RX 250: Performed by: HOSPITALIST

## 2021-04-16 PROCEDURE — P9016 RBC LEUKOCYTES REDUCED: HCPCS | Performed by: NURSE PRACTITIONER

## 2021-04-16 PROCEDURE — 74174 CTA ABD&PLVS W/CONTRAST: CPT | Mod: MG

## 2021-04-16 PROCEDURE — 250N000013 HC RX MED GY IP 250 OP 250 PS 637: Performed by: HOSPITALIST

## 2021-04-16 PROCEDURE — 250N000011 HC RX IP 250 OP 636: Performed by: PSYCHIATRY & NEUROLOGY

## 2021-04-16 PROCEDURE — 84155 ASSAY OF PROTEIN SERUM: CPT | Performed by: INTERNAL MEDICINE

## 2021-04-16 PROCEDURE — C1880 VENA CAVA FILTER: HCPCS

## 2021-04-16 PROCEDURE — 97530 THERAPEUTIC ACTIVITIES: CPT | Mod: GO

## 2021-04-16 PROCEDURE — 999N001017 HC STATISTIC GLUCOSE BY METER IP

## 2021-04-16 PROCEDURE — 85018 HEMOGLOBIN: CPT | Performed by: INTERNAL MEDICINE

## 2021-04-16 PROCEDURE — 85610 PROTHROMBIN TIME: CPT | Performed by: INTERNAL MEDICINE

## 2021-04-16 PROCEDURE — 82248 BILIRUBIN DIRECT: CPT | Performed by: INTERNAL MEDICINE

## 2021-04-16 PROCEDURE — 250N000011 HC RX IP 250 OP 636: Performed by: NURSE PRACTITIONER

## 2021-04-16 PROCEDURE — 250N000011 HC RX IP 250 OP 636: Performed by: HOSPITALIST

## 2021-04-16 PROCEDURE — 255N000002 HC RX 255 OP 636: Performed by: RADIOLOGY

## 2021-04-16 PROCEDURE — 99233 SBSQ HOSP IP/OBS HIGH 50: CPT | Performed by: INTERNAL MEDICINE

## 2021-04-16 PROCEDURE — 85730 THROMBOPLASTIN TIME PARTIAL: CPT | Performed by: INTERNAL MEDICINE

## 2021-04-16 PROCEDURE — 250N000013 HC RX MED GY IP 250 OP 250 PS 637: Performed by: INTERNAL MEDICINE

## 2021-04-16 PROCEDURE — 84450 TRANSFERASE (AST) (SGOT): CPT | Performed by: INTERNAL MEDICINE

## 2021-04-16 PROCEDURE — 82247 BILIRUBIN TOTAL: CPT | Performed by: INTERNAL MEDICINE

## 2021-04-16 PROCEDURE — 85384 FIBRINOGEN ACTIVITY: CPT | Performed by: INTERNAL MEDICINE

## 2021-04-16 PROCEDURE — 250N000012 HC RX MED GY IP 250 OP 636 PS 637: Performed by: INTERNAL MEDICINE

## 2021-04-16 PROCEDURE — 250N000009 HC RX 250: Performed by: NURSE PRACTITIONER

## 2021-04-16 PROCEDURE — 999N000190 HC STATISTIC VAT ROUNDS

## 2021-04-16 PROCEDURE — 272N000196 HC ACCESSORY CR5

## 2021-04-16 PROCEDURE — 258N000003 HC RX IP 258 OP 636: Performed by: INTERNAL MEDICINE

## 2021-04-16 PROCEDURE — 250N000011 HC RX IP 250 OP 636: Performed by: INTERNAL MEDICINE

## 2021-04-16 RX ORDER — NALOXONE HYDROCHLORIDE 0.4 MG/ML
0.4 INJECTION, SOLUTION INTRAMUSCULAR; INTRAVENOUS; SUBCUTANEOUS
Status: DISCONTINUED | OUTPATIENT
Start: 2021-04-16 | End: 2021-04-16

## 2021-04-16 RX ORDER — HEPARIN SODIUM 5000 [USP'U]/.5ML
5000 INJECTION, SOLUTION INTRAVENOUS; SUBCUTANEOUS EVERY 8 HOURS
Status: CANCELLED | OUTPATIENT
Start: 2021-04-17

## 2021-04-16 RX ORDER — ACYCLOVIR 50 MG/G
OINTMENT TOPICAL
Status: DISCONTINUED | OUTPATIENT
Start: 2021-04-16 | End: 2021-04-18

## 2021-04-16 RX ORDER — SULFAMETHOXAZOLE/TRIMETHOPRIM 800-160 MG
2 TABLET ORAL 2 TIMES DAILY
Status: DISCONTINUED | OUTPATIENT
Start: 2021-04-16 | End: 2021-04-18

## 2021-04-16 RX ORDER — ACETAMINOPHEN 325 MG/1
975 TABLET ORAL EVERY 8 HOURS
Status: CANCELLED | OUTPATIENT
Start: 2021-04-16 | End: 2021-04-19

## 2021-04-16 RX ORDER — IOPAMIDOL 612 MG/ML
50 INJECTION, SOLUTION INTRAVASCULAR ONCE
Status: COMPLETED | OUTPATIENT
Start: 2021-04-16 | End: 2021-04-16

## 2021-04-16 RX ORDER — ONDANSETRON 4 MG/1
4 TABLET, ORALLY DISINTEGRATING ORAL EVERY 6 HOURS PRN
Status: CANCELLED | OUTPATIENT
Start: 2021-04-16

## 2021-04-16 RX ORDER — FLUMAZENIL 0.1 MG/ML
0.2 INJECTION, SOLUTION INTRAVENOUS
Status: DISCONTINUED | OUTPATIENT
Start: 2021-04-16 | End: 2021-04-16

## 2021-04-16 RX ORDER — ACETAMINOPHEN 325 MG/1
650 TABLET ORAL EVERY 4 HOURS PRN
Status: CANCELLED | OUTPATIENT
Start: 2021-04-19

## 2021-04-16 RX ORDER — DOCUSATE SODIUM 100 MG/1
100 CAPSULE, LIQUID FILLED ORAL 2 TIMES DAILY
Status: CANCELLED | OUTPATIENT
Start: 2021-04-16

## 2021-04-16 RX ORDER — NALOXONE HYDROCHLORIDE 0.4 MG/ML
0.2 INJECTION, SOLUTION INTRAMUSCULAR; INTRAVENOUS; SUBCUTANEOUS
Status: DISCONTINUED | OUTPATIENT
Start: 2021-04-16 | End: 2021-04-16

## 2021-04-16 RX ORDER — OXYCODONE HYDROCHLORIDE 5 MG/1
10 TABLET ORAL EVERY 4 HOURS PRN
Status: CANCELLED | OUTPATIENT
Start: 2021-04-16

## 2021-04-16 RX ORDER — POLYETHYLENE GLYCOL 3350 17 G/17G
17 POWDER, FOR SOLUTION ORAL DAILY
Status: CANCELLED | OUTPATIENT
Start: 2021-04-17

## 2021-04-16 RX ORDER — ONDANSETRON 2 MG/ML
4 INJECTION INTRAMUSCULAR; INTRAVENOUS EVERY 6 HOURS PRN
Status: CANCELLED | OUTPATIENT
Start: 2021-04-16

## 2021-04-16 RX ORDER — PROCHLORPERAZINE MALEATE 5 MG
5 TABLET ORAL EVERY 6 HOURS PRN
Status: CANCELLED | OUTPATIENT
Start: 2021-04-16

## 2021-04-16 RX ORDER — IOPAMIDOL 755 MG/ML
80 INJECTION, SOLUTION INTRAVASCULAR ONCE
Status: COMPLETED | OUTPATIENT
Start: 2021-04-16 | End: 2021-04-16

## 2021-04-16 RX ORDER — AMOXICILLIN 250 MG
1 CAPSULE ORAL 2 TIMES DAILY
Status: CANCELLED | OUTPATIENT
Start: 2021-04-16

## 2021-04-16 RX ORDER — LEVALBUTEROL 1.25 MG/.5ML
1.25 SOLUTION, CONCENTRATE RESPIRATORY (INHALATION) EVERY 4 HOURS PRN
Status: DISCONTINUED | OUTPATIENT
Start: 2021-04-16 | End: 2021-04-17

## 2021-04-16 RX ORDER — FENTANYL CITRATE 50 UG/ML
25-50 INJECTION, SOLUTION INTRAMUSCULAR; INTRAVENOUS EVERY 5 MIN PRN
Status: DISCONTINUED | OUTPATIENT
Start: 2021-04-16 | End: 2021-04-16

## 2021-04-16 RX ORDER — LIDOCAINE 40 MG/G
CREAM TOPICAL
Status: CANCELLED | OUTPATIENT
Start: 2021-04-16

## 2021-04-16 RX ORDER — BISACODYL 10 MG
10 SUPPOSITORY, RECTAL RECTAL DAILY PRN
Status: CANCELLED | OUTPATIENT
Start: 2021-04-16

## 2021-04-16 RX ORDER — OXYCODONE HYDROCHLORIDE 5 MG/1
5 TABLET ORAL EVERY 4 HOURS PRN
Status: CANCELLED | OUTPATIENT
Start: 2021-04-16

## 2021-04-16 RX ADMIN — IOPAMIDOL 80 ML: 755 INJECTION, SOLUTION INTRAVENOUS at 11:29

## 2021-04-16 RX ADMIN — IOPAMIDOL 20 ML: 612 INJECTION, SOLUTION INTRAVENOUS at 10:46

## 2021-04-16 RX ADMIN — NYSTATIN 500000 UNITS: 100000 SUSPENSION ORAL at 08:25

## 2021-04-16 RX ADMIN — NYSTATIN 500000 UNITS: 100000 SUSPENSION ORAL at 21:27

## 2021-04-16 RX ADMIN — FLUOXETINE 80 MG: 20 CAPSULE ORAL at 08:26

## 2021-04-16 RX ADMIN — ACYCLOVIR: 50 OINTMENT TOPICAL at 14:42

## 2021-04-16 RX ADMIN — HEPARIN SODIUM 1 BAG: 200 INJECTION, SOLUTION INTRAVENOUS at 10:43

## 2021-04-16 RX ADMIN — Medication 12.5 MG: at 20:09

## 2021-04-16 RX ADMIN — INSULIN ASPART 1 UNITS: 100 INJECTION, SOLUTION INTRAVENOUS; SUBCUTANEOUS at 08:35

## 2021-04-16 RX ADMIN — PANTOPRAZOLE SODIUM 40 MG: 40 TABLET, DELAYED RELEASE ORAL at 08:26

## 2021-04-16 RX ADMIN — PANTOPRAZOLE SODIUM 40 MG: 40 TABLET, DELAYED RELEASE ORAL at 17:54

## 2021-04-16 RX ADMIN — LEVETIRACETAM 500 MG: 5 INJECTION, SOLUTION INTRAVENOUS at 08:25

## 2021-04-16 RX ADMIN — ACYCLOVIR: 50 OINTMENT TOPICAL at 20:10

## 2021-04-16 RX ADMIN — PIPERACILLIN SODIUM AND TAZOBACTAM SODIUM 3.38 G: 3; .375 INJECTION, POWDER, LYOPHILIZED, FOR SOLUTION INTRAVENOUS at 02:14

## 2021-04-16 RX ADMIN — HYDROMORPHONE HYDROCHLORIDE 0.2 MG: 0.2 INJECTION, SOLUTION INTRAMUSCULAR; INTRAVENOUS; SUBCUTANEOUS at 00:47

## 2021-04-16 RX ADMIN — SULFAMETHOXAZOLE AND TRIMETHOPRIM 2 TABLET: 800; 160 TABLET ORAL at 20:09

## 2021-04-16 RX ADMIN — BUSPIRONE HYDROCHLORIDE 30 MG: 15 TABLET ORAL at 20:08

## 2021-04-16 RX ADMIN — SULFAMETHOXAZOLE AND TRIMETHOPRIM 320 MG: 80; 16 INJECTION, SOLUTION, CONCENTRATE INTRAVENOUS at 05:01

## 2021-04-16 RX ADMIN — BUSPIRONE HYDROCHLORIDE 30 MG: 15 TABLET ORAL at 08:26

## 2021-04-16 RX ADMIN — NYSTATIN 500000 UNITS: 100000 SUSPENSION ORAL at 17:55

## 2021-04-16 RX ADMIN — SULFAMETHOXAZOLE AND TRIMETHOPRIM 2 TABLET: 800; 160 TABLET ORAL at 14:42

## 2021-04-16 RX ADMIN — HYDROMORPHONE HYDROCHLORIDE 0.2 MG: 0.2 INJECTION, SOLUTION INTRAMUSCULAR; INTRAVENOUS; SUBCUTANEOUS at 06:15

## 2021-04-16 RX ADMIN — PREDNISONE 20 MG: 20 TABLET ORAL at 08:26

## 2021-04-16 RX ADMIN — PIPERACILLIN SODIUM AND TAZOBACTAM SODIUM 3.38 G: 3; .375 INJECTION, POWDER, LYOPHILIZED, FOR SOLUTION INTRAVENOUS at 08:25

## 2021-04-16 RX ADMIN — ACYCLOVIR: 50 OINTMENT TOPICAL at 17:55

## 2021-04-16 RX ADMIN — LIDOCAINE HYDROCHLORIDE 10 ML: 10 INJECTION, SOLUTION INFILTRATION; PERINEURAL at 10:36

## 2021-04-16 RX ADMIN — NYSTATIN 500000 UNITS: 100000 SUSPENSION ORAL at 14:42

## 2021-04-16 RX ADMIN — SENNOSIDES AND DOCUSATE SODIUM 2 TABLET: 8.6; 5 TABLET ORAL at 08:27

## 2021-04-16 RX ADMIN — LEVETIRACETAM 500 MG: 5 INJECTION, SOLUTION INTRAVENOUS at 20:09

## 2021-04-16 RX ADMIN — SENNOSIDES AND DOCUSATE SODIUM 1 TABLET: 8.6; 5 TABLET ORAL at 20:08

## 2021-04-16 RX ADMIN — HYDROXYZINE HYDROCHLORIDE 50 MG: 25 TABLET, FILM COATED ORAL at 22:58

## 2021-04-16 RX ADMIN — SODIUM CHLORIDE: 9 INJECTION, SOLUTION INTRAVENOUS at 06:16

## 2021-04-16 RX ADMIN — SODIUM CHLORIDE 80 ML: 9 INJECTION, SOLUTION INTRAVENOUS at 11:30

## 2021-04-16 ASSESSMENT — ACTIVITIES OF DAILY LIVING (ADL)
ADLS_ACUITY_SCORE: 20
ADLS_ACUITY_SCORE: 19
ADLS_ACUITY_SCORE: 19
ADLS_ACUITY_SCORE: 20
ADLS_ACUITY_SCORE: 19
ADLS_ACUITY_SCORE: 20

## 2021-04-16 NOTE — PROGRESS NOTES
"[facility]    Infectious Disease Progress Note    Date of Service (when I saw the patient): 04/16/2021     Assessment & Plan   Olga Bailey is a 75 year old female who was admitted on 3/26/2021.     Impression:  1. 75 y.o with recent diagnosis of craniotomy and resection of glioblastoma multiforme.   2. Has been on very high dose steroid taper for the past month PTA  since the craniotomy. Not on any bactrim prophylaxis before admission.    3. Admitted with shortness of breath.   4. Found to have bilateral ground glass infiltrates on the imaging, very hypoxic. Concern for PJP. Never able to give sputum for sample. COVID PCR negative x 3.   5. COVID PCR negative x 2.   6. New AST today more than a 1000 which was normal 2 days ago, GI eval pending      Recommendations:   CT abdomen with new retroperitoneal bleed, was started on zosyn for leucocytosis and lactic acidosis, procal negative, which could be because of the bleed and the steroids, none the less no objection to it as we clarify the situation.     High suspicion for PJP pneumonia. LDH high, high beta d glucan unable to confirm with the sputum studies as patient has been unable to provide any sputum   Steroids for PJP continue to taper to be off steroids in next few days. Discussed with Im who also ran the course by neuro-onc.  Day 18/21- 26 septra was improving very well before this past 24- 36 hours with new bleed            Per neuro onc last note her cancer related plan was as listed in the note below, I would recommend follow up with Neuro onc after discharge.    I am copying and pasting Neuro onc last note below:   \" PLAN  -CANCER-DIRECTED THERAPY-  Will initiate chemoradiotherapy with temozolomide 75mg/m2 (140mg).   -Instructed to start temozolomide the evening before the start date of radiation and continue daily until the completion of radiation.   -Take temozolomide on an empty stomach, 30 minutes after Zofran " "dosing.  -Additional temozolomide teaching performed by RN/ pharmacy staff.      -Initial labs ordered; CBC with differential, CMP, Hepatitis B serologies; NR.  -Will continue weekly CBC and repeat CMP at follow-up.     -Medications prescribed;        -Zofran 4mg (1 to 2 tabs qHS 30 minutes prior to chemotherapy and then PRN nausea).       -For lymphopenia, prophylactic antibiotics are recommended during concurrent treatment. Will start Sulfamethoxazole-trimethoprim (Bactrim) 800 mg-160 mg; 1 tab orally Monday, Wednesday, and Friday for pneumocystis prophylaxis. If thrombocytopenia becomes an issue, can change to Dapsone, Atovaquone, or Pentamidine.        -Docusate 100 mg; 1 cap orally 3 times a day (stool softener for constipation)       -Senna 8.6 m tabs orally at bedtime (laxative as needed for constipation)     -STEROIDS-  -Continue to wean dexamethasone as tolerated;                           4mg daily (today - 3/28)                          2mg daily (3/29 - )                          2mg on , , , then stop.   -Dose may increase while undergoing radiation.\"          Robyn Branham MD    Interval History    Slightly more awake today     RRT 2 days ago : Spoke with Jailyn Hubbard who was called on rapid response -- patient was up to the bathroom after enema, bowels moved twice per nurse, then started jerking and less responsive, but nurse was still able to communicate with her, but more lethargic    Physical Exam   Temp: 97  F (36.1  C) Temp src: Axillary BP: 121/79 Pulse: 85   Resp: 19 SpO2: 99 % O2 Device: Nasal cannula Oxygen Delivery: 2 LPM  Vitals:    21 0700 21 0656 04/15/21 0600   Weight: 68.9 kg (151 lb 14.4 oz) 63.8 kg (140 lb 10.5 oz) 68.2 kg (150 lb 5.7 oz)     Vital Signs with Ranges  Temp:  [97  F (36.1  C)-98.8  F (37.1  C)] 97  F (36.1  C)  Pulse:  [] 85  Resp:  [18-39] 19  BP: ()/() 121/79  SpO2:  [93 %-100 %] 99 %    Constitutional: Awake, " alert  Lungs: diminished bilateral   Cardiovascular: Regular rate and rhythm, normal S1 and S2, and no murmur noted  Abdomen: Normal bowel sounds, soft, non-distended, non-tender  Skin: No rashes, no cyanosis, no edema  Other:    Medications     heparin       sodium chloride 75 mL/hr at 04/16/21 0616       amLODIPine  10 mg Oral Daily     busPIRone HCl  30 mg Oral BID     FLUoxetine  80 mg Oral Daily     insulin aspart  1-7 Units Subcutaneous TID AC     insulin aspart  1-5 Units Subcutaneous At Bedtime     iopamidol  50 mL Intravenous Once     levETIRAcetam  500 mg Intravenous Q12H     metoprolol tartrate  12.5 mg Oral BID     nystatin  500,000 Units Swish & Spit 4x Daily     pantoprazole  40 mg Oral BID AC     piperacillin-tazobactam  3.375 g Intravenous Q6H     predniSONE  20 mg Oral Daily     senna-docusate  2 tablet Oral BID    Or     senna-docusate  2 tablet Oral BID     sodium chloride (PF)  3 mL Intracatheter Q8H     sodium chloride (PF)  3 mL Intracatheter Q8H     [Held by provider] sulfamethoxazole-trimethoprim  320 mg Intravenous Q8H       Data   All microbiology laboratory data reviewed.  Recent Labs   Lab Test 04/16/21  0615 04/15/21  2010 04/15/21  1200 04/15/21  0530 04/14/21  1627 04/14/21  1627   WBC 13.5*  --   --  15.3*  --  16.1*   HGB 6.8* 8.4* 7.3* 8.2*   < > 7.3*   HCT 20.9*  --   --  25.2*  --  22.6*   MCV 84  --   --  86  --  90   *  --   --  118*  --  187    < > = values in this interval not displayed.     Recent Labs   Lab Test 04/16/21  0615 04/15/21  0530 04/14/21  1627   CR 0.80 0.64 0.79     No lab results found.  Recent Labs   Lab Test 03/29/21 1951 03/29/21 1946   CULT No growth No growth       Attestation:  Total time on the floor involved in the patient's care: 35 minutes. Total time spent in counseling/care coordination: >50%

## 2021-04-16 NOTE — IR NOTE
IVC FILTER PLACEMENT 4/16/2021 10:45 AM    CLINICAL HISTORY: Patient has a history of glioblastoma and lower extremity DVT. Patient developed a large right retroperitoneal hematoma on anticoagulation. Anticoagulation has been held. IVC filter was requested for pulmonary embolism protection.    DESCRIPTION OF PROCEDURE: After obtaining informed consent, the patient was placed in a supine position on the fluoroscopy table. The right groin was prepped and draped in the usual sterile manner. 1% lidocaine was injected for local anesthesia. Ultrasound was used to evaluate and document patency of the right femoral vein. Under sterile ultrasound guidance, a puncture was made into the femoral vein. A permanent copy image was obtained for the patient's records.    An 8.5 Polish vascular sheath was placed and taken into the lower IVC. A venogram was performed. The renal vein inflow was identified. No thrombus in the IVC was identified.     A retrievable Claiborne filter was placed with apex positioned at the level of the renal vein inflow. The right femoral sheath was pulled. Pressure was held at the puncture site for 7 minutes with good hemostasis.    The patient tolerated the procedure well. There were no immediate postprocedure complications. The patient's vital signs were monitored by radiology nursing staff under my supervision and remained stable throughout the study. Radiation dose for this scan was reduced using automated exposure control, adjustment of the mA and/or kV according to patient size, or iterative reconstruction technique.    FLUOROSCOPY TIME: 1.2 minutes    RADIATION DOSE: 37.43 mGy Air Kerma    MEDICATIONS: None    SEDATION TIME: None    CONTRAST: 20 cc Isovue    IMPRESSION:  IVC filter placed as above.

## 2021-04-16 NOTE — PLAN OF CARE
Pt lethargic, oriented x 2, AVSS, BP soft. Sinus rhythm. 95% on 1 liter O2.   Lungs diminished.  Turned and repositioned with assist. Pain control with 0.2 dilaudid.   NPO for IVC filter placement today. Pure wick inplaced    Pt HGB 6.8 this morning reported to Dr. Recinos. No order. He instructed to let Dr. Rosa know went he comes in.

## 2021-04-16 NOTE — PROGRESS NOTES
Care Management Follow Up    Length of Stay (days): 21    Expected Discharge Date: 04/20/21     Concerns to be Addressed: discharge planning  Pt states she is thinking about staying with her daughter at discharge. SW explained the potential for a TCU placement. Pt reports if it is something she needs to do, she will do it.  Patient plan of care discussed at interdisciplinary rounds: Yes    Anticipated Discharge Disposition: Home, Home Care, Transitional Care  Disposition Comments: Discuss the potential discharge planning for TCU or home with daughter  Anticipated Discharge Services: Other (see comment)(Therapy)  Anticipated Discharge DME: None    Patient/family educated on Medicare website which has current facility and service quality ratings:    Education Provided on the Discharge Plan:    Patient/Family in Agreement with the Plan:      Referrals Placed by CM/SW: Internal Clinic Care Coordination, Post Acute Facilities, Transportation, Communication hand-offs to next level of Care Providers  Private pay costs discussed: Not applicable    Additional Information:  SW placed call to pt's daughter, La Nena. SW inquired for more TCU choices. La Nena reports that she does not want pt to attend  [facility]. La Nena reports she was informed pt developed her pneumonia while at  [facility]. SW reports she understood and validated La Nena's concerns. SW stated they were thinking of sending referrals, once pt was closer to discharge, to other  facilities. CAMRON started to list the [facility] sites and La Nena reports she would be comfortable with SW sending a referral to [facility]. La Nena lives in [place]. La Nena mentioned [facility]. SW stated [facility] could be considered, but pt would need to have a neurological diagnosis. La Nena reports pt does have a neurological diagnoses with her  Glioblastoma, brain cancer). [place] also said [facility] [place] [facility] due to her location. SW to send  referrals for pt once pt is closer to discharge.       DAYNA Caban

## 2021-04-16 NOTE — PLAN OF CARE
VSS on 1LPM via nasal cannula. Tele NS tach. A/Ox2, disoriented to time and situation. Bruising on abdomen and right side related to retroperitoneal bleed. Right groin site from IVC filter placement today soft to the touch, no hematoma. Denies pain. Bedrest today due to retroperitoneal bleed, as well as deconditioned status, PT working with patient. DD2 diet with nectar thick liquids per request of patient's daughter. BS active, abdomen distended, no BM on shift. Voiding adequately to purewik. LS clear. IS to 250.

## 2021-04-16 NOTE — IR NOTE
Interventional Radiology Intra-procedural Nursing Note    Patient Name: Olga Bailey  Medical Record Number: [id number]  Today's Date: April 16, 2021    Start Time: 1033  End of procedure time: 1044  Procedure: Inferior vena cava filter placement with possible intravenous moderate sedation   Report given to: Arsenio Santana RN  Time pt departs:  1050 to CT, then 33.    Other Notes: Pt into IR suite 1 via cart. Pt awake and alert. To table in Supine position. Monitoring equipment applied. VSS. Tele SR. Dr. Gomez in room. Time out and procedure started. Pt tolerated procedure well. Debrief with Dr. Gomez. IVC filter placed and pressure held until hemostasis achieved. Dressing clean, dry and intact. No complications. Pt transferred back to Station 33.    Medications:    Lidocaine 1% 10 ml    Tucker Rebollar RN

## 2021-04-16 NOTE — PLAN OF CARE
[id number]    Date/Time: April 15, 2021 11:30 PM   Reason for Admission:Hypoxia    Cognitive/Mentation:x2, confused and lethargic  Neuros/CMS:intact ex tremors in all extremities, R drift, generalized weakness  VS:VSS on 1 L' B/P: 114/81, T: 97.9, P: 108, R: 23     :purewick in place, incontinent of bowel  Pain: declined pain   IV: @ 75; R triple lumen PICC  Skin:brusies scattered  Activity:bedrest  Diet:NPO @ midnight for IVC filter placement in AM    Discharge:pending    End of shift summary: hgb recheck 8.2. No events. Daughter updated.
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
## 2021-04-16 NOTE — PRE-PROCEDURE
GENERAL PRE-PROCEDURE:   Procedure:  Inferior vena cava filter placement with possible intravenous moderate sedation   Date/Time:  4/16/2021 7:40 AM    Written consent obtained?: Yes    Risks and benefits: Risks, benefits and alternatives were discussed    Consent given by:  Patient  Patient states understanding of procedure being performed: Yes    Patient's understanding of procedure matches consent: Yes    Procedure consent matches procedure scheduled: Yes    Expected level of sedation:  Moderate  Appropriately NPO:  Yes  ASA Class:  Class 3- Severe systemic disease, definite functional limitations  Mallampati  :  Grade 3- soft palate visible, posterior pharyngeal wall not visible  Lungs:  Lungs clear with good breath sounds bilaterally and other (comment)  Lung exam comment:  Decreased in bases   Heart:  Normal heart sounds and rate  History & Physical reviewed:  History and physical reviewed and no updates needed  Statement of review:  I have reviewed the lab findings, diagnostic data, medications, and the plan for sedation    Discussed with daughter La Nena cain POA and also patient. La Nena would like to be at the hospital first and see her mother prior to the procedure. She is also concerned about her mother receiving sedation so IR will most likely do the procedure with local only unless her mother is uncomfortable and then a small amount of sedation will be used.     Thanks [facility] Interventional Radiology CNP ([phone]) (phone [phone])

## 2021-04-16 NOTE — PLAN OF CARE
SLP: Attempted to see patient twice for speech/language tx. Patient out of room for procedure. Will continue to follow.

## 2021-04-16 NOTE — PROGRESS NOTES
[facility]    Medicine Progress Note - Hospitalist Service       Date of Admission:  3/26/2021  Assessment & Plan       Olga Bailey is a 75 year old female admitted on 3/26/2021.  Past medical history that is most notable for recent craniotomy and resection of glioblastoma multiforme, as well as uncomplicated asthma, who presents with dyspnea and is found to have acute bilateral pneumonia.     Acute hypoxic respiratory failure due to bilateral pneumonia, suspected PCP   -- discussed with ID, all consistent with PCP   -- Bactrim DS 2 tid for3 more days   -- start weaning Prednisone (on 30 mg daily today)    Retroperitoneal bleed 2nd to Lovenox 4/14/2021 AM   -- persistent hgb drop despite being off Lovenox for 48 hours   -- Will discuss with Gen Surgery, ?any thoughts of source of ongoing bleeding?    Elevated liver enzymes -- probable shock liver    Thrombocytopenia -- suspect consumption from bleeding   -- follow CBC     Leukocytosis --suspect stress related from retroperitonieal bleed, lactate now normal   -- stop Zosyn     Retroperitoneal bleed 2nd to Lovenox 4/14/2021 AM   -- Abd Pelvis CTA today without evidence of ongoing bleed     Ileus 2nd to bleed  Abd and Right Flank Pain -- 2nd to bleed    Bilateral LE DVT, hypercoagulable 2nd to GBM   -- US 3/29 occlusive DVT of bilateral posterior tibial veins and soleal veins    -- off Lovenox (last dose 4/14 AM) -- IR consulted for IVC filter this AM     Epistaxis   -- now stable, off lovenox     Hyponatremia  -- mild, 2nd to SIADH from stress and pulm process     Steroid induced hyperglycemia -- resolved   -- restart insulin as need     Urinary incontinence -- has pure wick     Hypokalemia, resolved     GBM s/p resection 2/23/21  Followed by Dr. Baker of Neuro-Oncology.  She is in process of being set up to start chemotherapy and XRT next week.   Appears to have been on prolonged steroids following neurosurgery as was discharged without  plan for taper and initiated taper only recently in follow up with Dr. Baker.  - now on prednisone as above with plan for prolonged taper  - has not yet initiated chemo (temozolomide) and planned radiation  - daughter reporting she had been working with SLP for language/cognition following her surgery, will order consult  - left message for X- -- can do online 2nd opinion with [place] or talk with Dr. Stephanie Baker     Hypertension  - continue PTA amlodipine     Sinus tachycardia -- clinically improved  - metoprolol 12.5 mg bid.      Asthma  MARCELLE  - nebs prn  - resume CPAP with home setting 4/9     Depression and anxiety  Insomnia  - continue PTA Prozac and Buspar  - continue PTA Atarax for sleep  - prn lorazepam     GERD  - switch to PPI as above     Chronic anemia of chronic disease, with Acute blood loss  Baseline hgb 10-11 g/dL, stable at 8     Non-severe malnutrition  Noted decrease oral intake and drop in albumin, subcutaneous fat loss on exam.   - PICC line in place  - excellent oral intake, tapered off TPN 4/9       Diet: Snacks/Supplements Adult: Boost Shake; Between Meals  Room Service  Advance Diet as Tolerated  Dysphagia Diet Level 2 [facility] Altered Nectar Thickened Liquids (pre-thickened or use instant food thickener)    DVT Prophylaxis: Enoxaparin (Lovenox) SQ  Martino Catheter: not present  Code Status: Full Code           Disposition Plan   Here several days until stable to discharge ?home with daughter vs TCU)     Bonilla Tian MD  Hospitalist Service  [facility]  Contact information available via [facility] Paging/Directory    (35 min total)    ______    Interval History   No flatus, appears more comfortable, slight appetite.     Physical Exam   Vital Signs: Temp: 98.9  F (37.2  C) Temp src: Axillary BP: 137/79 Pulse: 91   Resp: 26 SpO2: 92 % O2 Device: Nasal cannula Oxygen Delivery: 1 LPM  Weight: 150 lbs 5.66 oz    Gen: NAD, pleasant, elderly, frail  Resp: no focal  crackles,  no wheezes, no increased work of resp  CV: S1S2 heard, reg rhythm, reg rate  Abdo: soft, mild right sided tenderness, some bowel sounds, mildly distended, ecchymosis on right flank   Ext: calves nontender, well perfused  Neuro: Alert but weak, some talking, CN grossly intact, no facial asymmetry      Data   Recent Labs   Lab 04/16/21  1800 04/16/21  0815 04/16/21  0615 04/15/21  2010 04/15/21  0530 04/15/21  0530 04/14/21 2150 04/14/21 2150 04/14/21  1627   WBC  --   --  13.5*  --   --  15.3*  --   --  16.1*   HGB 8.3*  --  6.8* 8.4*   < > 8.2*   < > 7.2* 7.3*   MCV  --   --  84  --   --  86  --   --  90   PLT  --   --  102*  --   --  118*  --   --  187   INR  --  1.18*  --   --   --   --   --   --   --    NA  --   --  129*  --   --  128*  --   --  130*   POTASSIUM  --   --  4.9  --   --  5.3  --   --  5.3   CHLORIDE  --   --  100  --   --  99  --   --  98   CO2  --   --  21  --   --  21  --   --  25   BUN  --   --  37*  --   --  40*  --   --  42*   CR  --   --  0.80  --   --  0.64  --   --  0.79   ANIONGAP  --   --  8  --   --  8  --   --  7   ESTIVEN  --   --  7.6*  --   --  7.5*  --   --  8.8   GLC  --   --  200*  --   --  289*  --   --  240*   ALBUMIN  --   --  1.9*  --   --   --   --   --  2.6*   PROTTOTAL  --   --  4.2*  --   --   --   --   --  5.4*   BILITOTAL  --   --  0.2  --   --   --   --   --  0.2   ALKPHOS  --   --   --   --   --   --   --   --  88   ALT  --   --  1,463*  --   --   --   --   --  61*   AST  --   --  1,013*  --   --   --   --   --  22   TROPI  --   --   --   --   --   --   --  0.021 <0.015    < > = values in this interval not displayed.     Recent Results (from the past 24 hour(s))   IR IVC Filter Placement    Narrative    IVC FILTER PLACEMENT 4/16/2021 10:45 AM    CLINICAL HISTORY: Patient has a history of glioblastoma and lower  extremity DVT. Patient developed a large right retroperitoneal  hematoma on anticoagulation. Anticoagulation has been held. IVC filter  was requested  for pulmonary embolism protection.    DESCRIPTION OF PROCEDURE: After obtaining informed consent, the  patient was placed in a supine position on the fluoroscopy table. The  right groin was prepped and draped in the usual sterile manner. 1%  lidocaine was injected for local anesthesia. Ultrasound was used to  evaluate and document patency of the right femoral vein. Under sterile  ultrasound guidance, a puncture was made into the femoral vein. A  permanent copy image was obtained for the patient's records.    An 8.5 Macedonian vascular sheath was placed and taken into the lower IVC.  A venogram was performed. The renal vein inflow was identified. No  thrombus in the IVC was identified.     A retrievable Aleida filter was placed with apex positioned at the  level of the renal vein inflow. The right femoral sheath was pulled.  Pressure was held at the puncture site for 7 minutes with good  hemostasis.    The patient tolerated the procedure well. There were no immediate  postprocedure complications. The patient's vital signs were monitored  by radiology nursing staff under my supervision and remained stable  throughout the study. Radiation dose for this scan was reduced using  automated exposure control, adjustment of the mA and/or kV according  to patient size, or iterative reconstruction technique.    FLUOROSCOPY TIME: 1.2 minutes    RADIATION DOSE: 37.43 mGy Air Kerma    MEDICATIONS: None    SEDATION TIME: None    CONTRAST: 20 cc Isovue      Impression    IMPRESSION:  IVC filter placed as above.    JESSICA WAGGONER MD   US Abdomen Limited w Abd/Pelvis Duplex Complete    Narrative    US ABDOMEN LIMITED WITH DOPPLER COMPLETE  4/16/2021 11:34 AM    HISTORY: Please determine if there is a portal vein thrombosis.  Abdominal pain.    COMPARISON: 4/14/2021    FINDINGS: The liver is unremarkable, without evidence for hepatic mass  or fatty infiltration. The gallbladder is unremarkable, without  evidence of cholelithiasis.  Patient is nontender over the gallbladder.  No intra or extrahepatic bile duct dilatation. Pancreas is partially  obscured by overlying bowel gas, but appears unremarkable where seen.  Right kidney is normal. No hydronephrosis. Small right pleural  effusion. Again noted there is a complex fluid collection inferior to  the liver, unchanged. Trace ascites.    DOPPLER:    The IVC is segmentally seen, and are of normal caliber where  visualized.    Hepatic artery is patent with antegrade flow.    Main, left and right portal veins are patent with antegrade flow.  Splenic vein at the pancreatic tail is patent with antegrade flow.    Left, middle and right hepatic veins are patent with antegrade flow.      Impression    IMPRESSION:  1. Portal veins, hepatic veins, splenic vein and hepatic artery are  all patent with antegrade flow.  2. Small right pleural effusion and trace ascites.  3. Complex fluid collection inferior to the right lobe of the liver  consistent with hematoma is similar to the prior CT.    CASTILLO DEJESUS,    CTA Abdomen Pelvis with Contrast    Narrative    CTA ABDOMEN PELVIS WITH CONTRAST  4/16/2021 11:49 AM     HISTORY:  Retroperitoneal hematoma.    COMPARISON: CT of the abdomen pelvis dated 4/14/2021    TECHNIQUE: CT angiogram of the abdomen and pelvis was performed  following the administration of 80 mL intravenous contrast. Images are  viewed in multiple planes and 3-D reconstructions were also performed.  Radiation dose for this scan was reduced using automated exposure  control, adjustment of the mA and/or kV according to patient size, or  iterative reconstruction technique.    FINDINGS: Again identified is a large right retroperitoneal hematoma.  This now measures approximately 16 x 11 cm in maximum craniocaudad and  transverse dimensions versus 12 x 11 cm on the previous exam. There is  increased extension of this hematoma to the right iliacus muscle and  right psoas muscle. There continues to  be mass effect on the adjacent  right kidney which is displaced anteriorly and to the left. There is  likely some extension into the peritoneal space adjacent to the  inferior right lower liver. Small amount of hemoperitoneum in this  region. There also appears to be a small amount of mildly dense  ascites in the dependent pelvis. There is no evidence for active  extravasation.    A left rectus sheath hematoma is unchanged.    The abdominal aorta is of normal caliber without aneurysmal dilatation  or significant stenosis. The visceral branches of the abdominal aorta  are patent without significant stenoses. The iliac arteries and  proximal femoral arteries are patent without significant stenoses.    Again identified are subcentimeter cystic lesions in the kidneys. One  of these located at the posterior interpolar region of the left kidney  is hyperdense and does not meet strict criteria for simple cyst.  Remaining solid organs appear grossly unremarkable.    Again identified are scattered colonic diverticuli. These are most  concentrated in the sigmoid colon.    Again identified are small hematomas in the anterior abdominal wall  most likely from injections.    There is a new IVC filter.    There is a new small right pleural effusion with adjacent atelectasis.  Probable atelectatic changes at the left lung base.      Impression    IMPRESSION:  1. Interval increase in size of a right retroperitoneal hematoma with  probable extension into the peritoneal space adjacent to the right  lobe of the liver. In addition, small amount of dense ascites likely  representing blood is noted within the dependent pelvis. There remains  significant mass effect on the adjacent right kidney by the hematoma.  2. Stable left rectus abdominous hematoma.  3. Small right pleural effusion with adjacent atelectasis. Probable  left basilar atelectasis.  4. Sigmoid diverticulosis.    JESSICA WAGGONER MD
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
## 2021-04-17 ENCOUNTER — APPOINTMENT (OUTPATIENT)
Dept: GENERAL RADIOLOGY | Facility: CLINIC | Age: 76
DRG: 166 | End: 2021-04-17
Attending: NURSE PRACTITIONER
Payer: MEDICARE

## 2021-04-17 ENCOUNTER — APPOINTMENT (OUTPATIENT)
Dept: GENERAL RADIOLOGY | Facility: CLINIC | Age: 76
DRG: 166 | End: 2021-04-17
Attending: INTERNAL MEDICINE
Payer: MEDICARE

## 2021-04-17 LAB
ABO + RH BLD: NORMAL
ALBUMIN SERPL-MCNC: 2 G/DL (ref 3.4–5)
ALP SERPL-CCNC: 63 U/L (ref 40–150)
ALT SERPL W P-5'-P-CCNC: 949 U/L (ref 0–50)
ANION GAP SERPL CALCULATED.3IONS-SCNC: 5 MMOL/L (ref 3–14)
AST SERPL W P-5'-P-CCNC: 351 U/L (ref 0–45)
BILIRUB DIRECT SERPL-MCNC: <0.1 MG/DL (ref 0–0.2)
BILIRUB SERPL-MCNC: 0.2 MG/DL (ref 0.2–1.3)
BLD GP AB SCN SERPL QL: NORMAL
BLD GP AB SCN SERPL QL: NORMAL
BLD PROD TYP BPU: NORMAL
BLD UNIT ID BPU: 0
BLOOD BANK CMNT PATIENT-IMP: NORMAL
BLOOD BANK CMNT PATIENT-IMP: NORMAL
BLOOD PRODUCT CODE: NORMAL
BPU ID: NORMAL
BUN SERPL-MCNC: 29 MG/DL (ref 7–30)
CALCIUM SERPL-MCNC: 8.2 MG/DL (ref 8.5–10.1)
CHLORIDE SERPL-SCNC: 105 MMOL/L (ref 94–109)
CK SERPL-CCNC: 96 U/L (ref 30–225)
CO2 SERPL-SCNC: 24 MMOL/L (ref 20–32)
CREAT SERPL-MCNC: 0.54 MG/DL (ref 0.52–1.04)
ERYTHROCYTE [DISTWIDTH] IN BLOOD BY AUTOMATED COUNT: 17.7 % (ref 10–15)
GFR SERPL CREATININE-BSD FRML MDRD: >90 ML/MIN/{1.73_M2}
GLUCOSE BLDC GLUCOMTR-MCNC: 102 MG/DL (ref 70–99)
GLUCOSE BLDC GLUCOMTR-MCNC: 118 MG/DL (ref 70–99)
GLUCOSE BLDC GLUCOMTR-MCNC: 86 MG/DL (ref 70–99)
GLUCOSE SERPL-MCNC: 79 MG/DL (ref 70–99)
HCT VFR BLD AUTO: 21.8 % (ref 35–47)
HGB BLD-MCNC: 7.2 G/DL (ref 11.7–15.7)
HGB BLD-MCNC: 7.6 G/DL (ref 11.7–15.7)
HGB BLD-MCNC: 7.8 G/DL (ref 11.7–15.7)
HGB BLD-MCNC: 8.9 G/DL (ref 11.7–15.7)
LACTATE BLD-SCNC: 0.7 MMOL/L (ref 0.7–2)
LACTATE BLD-SCNC: 1.1 MMOL/L (ref 0.7–2)
MAGNESIUM SERPL-MCNC: 2.3 MG/DL (ref 1.6–2.3)
MCH RBC QN AUTO: 28.7 PG (ref 26.5–33)
MCHC RBC AUTO-ENTMCNC: 33 G/DL (ref 31.5–36.5)
MCV RBC AUTO: 87 FL (ref 78–100)
NUM BPU REQUESTED: 1
NUM BPU REQUESTED: 5
PHOSPHATE SERPL-MCNC: 2.1 MG/DL (ref 2.5–4.5)
PLATELET # BLD AUTO: 87 10E9/L (ref 150–450)
POTASSIUM SERPL-SCNC: 4.4 MMOL/L (ref 3.4–5.3)
PROT SERPL-MCNC: 4.9 G/DL (ref 6.8–8.8)
RBC # BLD AUTO: 2.51 10E12/L (ref 3.8–5.2)
SODIUM SERPL-SCNC: 134 MMOL/L (ref 133–144)
SPECIMEN EXP DATE BLD: NORMAL
SPECIMEN EXP DATE BLD: NORMAL
TRANSFUSION STATUS PATIENT QL: NORMAL
TRANSFUSION STATUS PATIENT QL: NORMAL
WBC # BLD AUTO: 12.4 10E9/L (ref 4–11)

## 2021-04-17 PROCEDURE — 99233 SBSQ HOSP IP/OBS HIGH 50: CPT | Performed by: INTERNAL MEDICINE

## 2021-04-17 PROCEDURE — 80053 COMPREHEN METABOLIC PANEL: CPT | Performed by: INTERNAL MEDICINE

## 2021-04-17 PROCEDURE — 999N001017 HC STATISTIC GLUCOSE BY METER IP

## 2021-04-17 PROCEDURE — 258N000003 HC RX IP 258 OP 636: Performed by: INTERNAL MEDICINE

## 2021-04-17 PROCEDURE — 250N000009 HC RX 250: Performed by: HOSPITALIST

## 2021-04-17 PROCEDURE — 86709 HEPATITIS A IGM ANTIBODY: CPT | Performed by: INTERNAL MEDICINE

## 2021-04-17 PROCEDURE — 82550 ASSAY OF CK (CPK): CPT | Performed by: INTERNAL MEDICINE

## 2021-04-17 PROCEDURE — 85018 HEMOGLOBIN: CPT | Performed by: INTERNAL MEDICINE

## 2021-04-17 PROCEDURE — 83605 ASSAY OF LACTIC ACID: CPT | Performed by: INTERNAL MEDICINE

## 2021-04-17 PROCEDURE — 999N000040 HC STATISTIC CONSULT NO CHARGE VASC ACCESS

## 2021-04-17 PROCEDURE — 99207 PR CDG-CODE CATEGORY CHANGED: CPT | Performed by: NURSE PRACTITIONER

## 2021-04-17 PROCEDURE — 250N000013 HC RX MED GY IP 250 OP 250 PS 637: Performed by: HOSPITALIST

## 2021-04-17 PROCEDURE — P9016 RBC LEUKOCYTES REDUCED: HCPCS | Performed by: NURSE PRACTITIONER

## 2021-04-17 PROCEDURE — 250N000012 HC RX MED GY IP 250 OP 636 PS 637: Performed by: INTERNAL MEDICINE

## 2021-04-17 PROCEDURE — 99231 SBSQ HOSP IP/OBS SF/LOW 25: CPT | Performed by: NURSE PRACTITIONER

## 2021-04-17 PROCEDURE — 99231 SBSQ HOSP IP/OBS SF/LOW 25: CPT | Performed by: SURGERY

## 2021-04-17 PROCEDURE — 84100 ASSAY OF PHOSPHORUS: CPT | Performed by: INTERNAL MEDICINE

## 2021-04-17 PROCEDURE — 250N000011 HC RX IP 250 OP 636: Performed by: INTERNAL MEDICINE

## 2021-04-17 PROCEDURE — 85027 COMPLETE CBC AUTOMATED: CPT | Performed by: INTERNAL MEDICINE

## 2021-04-17 PROCEDURE — 120N000013 HC R&B IMCU

## 2021-04-17 PROCEDURE — 83735 ASSAY OF MAGNESIUM: CPT | Performed by: INTERNAL MEDICINE

## 2021-04-17 PROCEDURE — 86803 HEPATITIS C AB TEST: CPT | Performed by: INTERNAL MEDICINE

## 2021-04-17 PROCEDURE — 86900 BLOOD TYPING SEROLOGIC ABO: CPT | Performed by: INTERNAL MEDICINE

## 2021-04-17 PROCEDURE — 82248 BILIRUBIN DIRECT: CPT | Performed by: INTERNAL MEDICINE

## 2021-04-17 PROCEDURE — 87340 HEPATITIS B SURFACE AG IA: CPT | Performed by: INTERNAL MEDICINE

## 2021-04-17 PROCEDURE — 83605 ASSAY OF LACTIC ACID: CPT | Performed by: NURSE PRACTITIONER

## 2021-04-17 PROCEDURE — 74018 RADEX ABDOMEN 1 VIEW: CPT

## 2021-04-17 PROCEDURE — 86705 HEP B CORE ANTIBODY IGM: CPT | Performed by: INTERNAL MEDICINE

## 2021-04-17 PROCEDURE — 250N000013 HC RX MED GY IP 250 OP 250 PS 637: Performed by: INTERNAL MEDICINE

## 2021-04-17 PROCEDURE — 999N000226 HC STATISTIC SLP IP EVAL DEFER: Performed by: SPEECH-LANGUAGE PATHOLOGIST

## 2021-04-17 PROCEDURE — 85018 HEMOGLOBIN: CPT | Performed by: PHYSICIAN ASSISTANT

## 2021-04-17 PROCEDURE — 71045 X-RAY EXAM CHEST 1 VIEW: CPT

## 2021-04-17 PROCEDURE — 86901 BLOOD TYPING SEROLOGIC RH(D): CPT | Performed by: INTERNAL MEDICINE

## 2021-04-17 PROCEDURE — 86850 RBC ANTIBODY SCREEN: CPT | Performed by: INTERNAL MEDICINE

## 2021-04-17 RX ORDER — PREDNISONE 5 MG/1
10 TABLET ORAL DAILY
Status: DISCONTINUED | OUTPATIENT
Start: 2021-04-18 | End: 2021-04-19

## 2021-04-17 RX ORDER — DEXTROSE MONOHYDRATE 100 MG/ML
INJECTION, SOLUTION INTRAVENOUS CONTINUOUS PRN
Status: DISCONTINUED | OUTPATIENT
Start: 2021-04-17 | End: 2021-04-26 | Stop reason: HOSPADM

## 2021-04-17 RX ORDER — LEVETIRACETAM 500 MG/1
500 TABLET ORAL 2 TIMES DAILY
Status: DISCONTINUED | OUTPATIENT
Start: 2021-04-17 | End: 2021-04-19

## 2021-04-17 RX ORDER — FUROSEMIDE 10 MG/ML
20 INJECTION INTRAMUSCULAR; INTRAVENOUS EVERY 12 HOURS
Status: DISCONTINUED | OUTPATIENT
Start: 2021-04-17 | End: 2021-04-18

## 2021-04-17 RX ORDER — FUROSEMIDE 10 MG/ML
40 INJECTION INTRAMUSCULAR; INTRAVENOUS ONCE
Status: COMPLETED | OUTPATIENT
Start: 2021-04-17 | End: 2021-04-17

## 2021-04-17 RX ORDER — ALBUTEROL SULFATE 0.83 MG/ML
2.5 SOLUTION RESPIRATORY (INHALATION) EVERY 4 HOURS PRN
Status: DISCONTINUED | OUTPATIENT
Start: 2021-04-17 | End: 2021-04-19

## 2021-04-17 RX ORDER — ALBUTEROL SULFATE 5 MG/ML
2.5 SOLUTION RESPIRATORY (INHALATION) EVERY 4 HOURS PRN
Status: DISCONTINUED | OUTPATIENT
Start: 2021-04-17 | End: 2021-04-17

## 2021-04-17 RX ADMIN — ACYCLOVIR: 50 OINTMENT TOPICAL at 04:21

## 2021-04-17 RX ADMIN — Medication 12.5 MG: at 08:44

## 2021-04-17 RX ADMIN — LEVETIRACETAM 500 MG: 500 TABLET, FILM COATED ORAL at 20:24

## 2021-04-17 RX ADMIN — ACYCLOVIR: 50 OINTMENT TOPICAL at 20:38

## 2021-04-17 RX ADMIN — SODIUM CHLORIDE: 9 INJECTION, SOLUTION INTRAVENOUS at 00:14

## 2021-04-17 RX ADMIN — LEVETIRACETAM 500 MG: 500 TABLET, FILM COATED ORAL at 10:30

## 2021-04-17 RX ADMIN — Medication 12.5 MG: at 20:25

## 2021-04-17 RX ADMIN — ACYCLOVIR: 50 OINTMENT TOPICAL at 15:05

## 2021-04-17 RX ADMIN — AMLODIPINE BESYLATE 10 MG: 10 TABLET ORAL at 08:44

## 2021-04-17 RX ADMIN — SENNOSIDES AND DOCUSATE SODIUM 2 TABLET: 8.6; 5 TABLET ORAL at 08:44

## 2021-04-17 RX ADMIN — PANTOPRAZOLE SODIUM 40 MG: 40 TABLET, DELAYED RELEASE ORAL at 08:44

## 2021-04-17 RX ADMIN — SULFAMETHOXAZOLE AND TRIMETHOPRIM 2 TABLET: 800; 160 TABLET ORAL at 20:25

## 2021-04-17 RX ADMIN — PANTOPRAZOLE SODIUM 40 MG: 40 TABLET, DELAYED RELEASE ORAL at 20:25

## 2021-04-17 RX ADMIN — BUSPIRONE HYDROCHLORIDE 30 MG: 15 TABLET ORAL at 20:24

## 2021-04-17 RX ADMIN — ACYCLOVIR: 50 OINTMENT TOPICAL at 17:37

## 2021-04-17 RX ADMIN — ASCORBIC ACID, VITAMIN A PALMITATE, CHOLECALCIFEROL, THIAMINE HYDROCHLORIDE, RIBOFLAVIN-5 PHOSPHATE SODIUM, PYRIDOXINE HYDROCHLORIDE, NIACINAMIDE, DEXPANTHENOL, ALPHA-TOCOPHEROL ACETATE, VITAMIN K1, FOLIC ACID, BIOTIN, CYANOCOBALAMIN: 200; 3300; 200; 6; 3.6; 6; 40; 15; 10; 150; 600; 60; 5 INJECTION, SOLUTION INTRAVENOUS at 20:14

## 2021-04-17 RX ADMIN — I.V. FAT EMULSION 250 ML: 20 EMULSION INTRAVENOUS at 20:14

## 2021-04-17 RX ADMIN — ALBUTEROL SULFATE 2.5 MG: 2.5 SOLUTION RESPIRATORY (INHALATION) at 15:02

## 2021-04-17 RX ADMIN — PREDNISONE 20 MG: 20 TABLET ORAL at 08:44

## 2021-04-17 RX ADMIN — BUSPIRONE HYDROCHLORIDE 30 MG: 15 TABLET ORAL at 08:44

## 2021-04-17 RX ADMIN — FLUOXETINE 80 MG: 20 CAPSULE ORAL at 08:44

## 2021-04-17 RX ADMIN — SENNOSIDES AND DOCUSATE SODIUM 2 TABLET: 8.6; 5 TABLET ORAL at 20:25

## 2021-04-17 RX ADMIN — FUROSEMIDE 40 MG: 10 INJECTION, SOLUTION INTRAMUSCULAR; INTRAVENOUS at 14:59

## 2021-04-17 RX ADMIN — ACYCLOVIR: 50 OINTMENT TOPICAL at 07:04

## 2021-04-17 RX ADMIN — SULFAMETHOXAZOLE AND TRIMETHOPRIM 2 TABLET: 800; 160 TABLET ORAL at 08:44

## 2021-04-17 RX ADMIN — FUROSEMIDE 20 MG: 10 INJECTION, SOLUTION INTRAMUSCULAR; INTRAVENOUS at 20:24

## 2021-04-17 RX ADMIN — ACYCLOVIR: 50 OINTMENT TOPICAL at 08:46

## 2021-04-17 ASSESSMENT — MIFFLIN-ST. JEOR: SCORE: 1206.13

## 2021-04-17 NOTE — PLAN OF CARE
[facility]: VSS except needing 3L of O2 .Tele SR. A/Ox 2. Incisions CDI, bruised throughout abdomen and flank. Reports minor discomfort, relieved with repositioned. Bedrest w/ turn and repo. Blood sugars monitored/controlled. Flatus -, bowels faint. Borderline low output, concentrated urine, IVF stopped d/t increased respiratory drive. LS diminished, expiratory wheezes at times. CXR completed, slightly worsening infiltrates therefore NPO diet was started. 1 unit of blood given, recheck hemoglobin 8.9. CTM

## 2021-04-17 NOTE — PROGRESS NOTES
[facility]    Medicine Progress Note - Hospitalist Service       Date of Admission:  3/26/2021  Assessment & Plan       Olga Bailey is a 75 year old female admitted on 3/26/2021.  Past medical history that is most notable for recent craniotomy and resection of glioblastoma multiforme, as well as uncomplicated asthma, who presents with dyspnea and is found to have acute bilateral pneumonia.     Acute hypoxic respiratory failure due to bilateral pneumonia, suspected PCP   -- discussed with ID, all consistent with PCP   -- Bactrim DS 2 tid for3 more days   -- start weaning Prednisone (on 30 mg daily today)    Retroperitoneal bleed 2nd to Lovenox 4/14/2021 AM   -- persistent hgb drop despite being off Lovenox for 48 hours   -- hgb more stable after 1 unit PRBC's today, check Retic in AM    Increased SOB, suspect mild fluid overload -- increased wt 2nd to blood and IV fluids and TPN   -- CXR today with no new infiltrates   -- IV lasix bid    Persistent Anemia -- suspect component of anemia of acute illness, and has anemia of chronic disease at baseline    -- check CBC with retic count tomorrow    Shock Liver with Elevated LFT's -- related to Retroperitoneal Bleed, improving     Thrombocytopenia -- suspect consumption from bleeding   -- follow CBC     Leukocytosis --suspect stress related      Retroperitoneal bleed 2nd to Lovenox 4/14/2021 AM   -- Abd Pelvis CTA  without evidence of ongoing bleed     Ileus 2nd to bleed -- persistent, probable 2nd to retroperitoneal bleed   -- will restart TPN until taking adequate PO    Abd and Right Flank Pain -- 2nd to bleed    Bilateral Lower Leg DVT on 3/29, hypercoagulable 2nd to GBM   -- off Lovenox (last dose 4/14 AM) -- IR consulted for IVC filter this AM     Epistaxis   -- now stable, off lovenox     Hyponatremia  -- mild, 2nd to SIADH from stress and pulm process     Steroid induced hyperglycemia -- resolved   -- restart insulin as  need     Urinary incontinence -- has pure wick     Hypokalemia, resolved     GBM s/p resection 2/23/21  Followed by Dr. Baker of Neuro-Oncology.  She is in process of being set up to start chemotherapy and XRT next week.   Appears to have been on prolonged steroids following neurosurgery as was discharged without plan for taper and initiated taper only recently in follow up with Dr. Baker.  - now on prednisone as above with plan for prolonged taper  - has not yet initiated chemo (temozolomide) and planned radiation  - daughter reporting she had been working with SLP for language/cognition following her surgery, will order consult  - left message for X- -- can do online 2nd opinion with [place] or talk with Dr. Stephanie Baker     Hypertension  - continue PTA amlodipine     Sinus tachycardia -- clinically improved  - metoprolol 12.5 mg bid.      Asthma  MARCELLE  - nebs prn  - resume CPAP with home setting 4/9     Depression and anxiety  Insomnia  - continue PTA Prozac and Buspar  - continue PTA Atarax for sleep  - prn lorazepam     GERD  - switch to PPI as above     Chronic anemia of chronic disease, with Acute blood loss  Baseline hgb 10-11 g/dL, stable at 8     Non-severe malnutrition  Noted decrease oral intake and drop in albumin, subcutaneous fat loss on exam.   - PICC line in place  - excellent oral intake, tapered off TPN 4/9       Diet: Snacks/Supplements Adult: Boost Shake; Between Meals  Room Service  Advance Diet as Tolerated  Dysphagia Diet Level 2 [facility] Altered Nectar Thickened Liquids (pre-thickened or use instant food thickener)    DVT Prophylaxis: Enoxaparin (Lovenox) SQ  Martino Catheter: not present  Code Status: Full Code           Disposition Plan   Here several days until stable to discharge ?home with daughter vs TCU)     Bonilla Tian MD  Hospitalist Service  [facility]  Contact information available via [facility] Paging/Directory    (35 min  total)    ______    Interval History   No flatus yet, and now increased SOB after 1 Unit PRBC's this AM    Physical Exam   Vital Signs: Temp: 98.1  F (36.7  C) Temp src: Axillary BP: 128/73 Pulse: 78   Resp: 18 SpO2: 94 % O2 Device: Nasal cannula Oxygen Delivery: 2 LPM  Weight: 163 lbs 9.3 oz    Gen: NAD, pleasant, elderly, frail  Resp: slight crackles at base, wt 163 (?up 13 lbs ?if accurate)  CV: S1S2 heard, reg rhythm, reg rate  Abdo: soft, mild right sided tenderness, some bowel sounds, mildly distended, ecchymosis on right flank   Ext: calves nontender, well perfused, no ankle edema  Neuro: Alert but weak, some talking, CN grossly intact, no facial asymmetry      Data   Recent Labs   Lab 04/17/21  0556 04/17/21  0014 04/16/21  1800 04/16/21  0815 04/16/21  0615 04/15/21  0530 04/15/21  0530 04/14/21  2150 04/14/21  2150 04/14/21  1627   WBC 12.4*  --   --   --  13.5*  --  15.3*  --   --  16.1*   HGB 7.2* 7.8* 8.3*  --  6.8*   < > 8.2*   < > 7.2* 7.3*   MCV 87  --   --   --  84  --  86  --   --  90   PLT 87*  --   --   --  102*  --  118*  --   --  187   INR  --   --   --  1.18*  --   --   --   --   --   --      --   --   --  129*  --  128*  --   --  130*   POTASSIUM 4.4  --   --   --  4.9  --  5.3  --   --  5.3   CHLORIDE 105  --   --   --  100  --  99  --   --  98   CO2 24  --   --   --  21  --  21  --   --  25   BUN 29  --   --   --  37*  --  40*  --   --  42*   CR 0.54  --   --   --  0.80  --  0.64  --   --  0.79   ANIONGAP 5  --   --   --  8  --  8  --   --  7   ESTIVEN 8.2*  --   --   --  7.6*  --  7.5*  --   --  8.8   GLC 79  --   --   --  200*  --  289*  --   --  240*   ALBUMIN 2.0*  --   --   --  1.9*  --   --   --   --  2.6*   PROTTOTAL 4.9*  --   --   --  4.2*  --   --   --   --  5.4*   BILITOTAL 0.2  --   --   --  0.2  --   --   --   --  0.2   ALKPHOS 63  --   --   --   --   --   --   --   --  88   *  --   --   --  1,463*  --   --   --   --  61*   *  --   --   --  1,013*  --   --   --    --  22   TROPI  --   --   --   --   --   --   --   --  0.021 <0.015    < > = values in this interval not displayed.     Recent Results (from the past 24 hour(s))   IR IVC Filter Placement    Narrative    IVC FILTER PLACEMENT 4/16/2021 10:45 AM    CLINICAL HISTORY: Patient has a history of glioblastoma and lower  extremity DVT. Patient developed a large right retroperitoneal  hematoma on anticoagulation. Anticoagulation has been held. IVC filter  was requested for pulmonary embolism protection.    DESCRIPTION OF PROCEDURE: After obtaining informed consent, the  patient was placed in a supine position on the fluoroscopy table. The  right groin was prepped and draped in the usual sterile manner. 1%  lidocaine was injected for local anesthesia. Ultrasound was used to  evaluate and document patency of the right femoral vein. Under sterile  ultrasound guidance, a puncture was made into the femoral vein. A  permanent copy image was obtained for the patient's records.    An 8.5 Palestinian vascular sheath was placed and taken into the lower IVC.  A venogram was performed. The renal vein inflow was identified. No  thrombus in the IVC was identified.     A retrievable Cherry filter was placed with apex positioned at the  level of the renal vein inflow. The right femoral sheath was pulled.  Pressure was held at the puncture site for 7 minutes with good  hemostasis.    The patient tolerated the procedure well. There were no immediate  postprocedure complications. The patient's vital signs were monitored  by radiology nursing staff under my supervision and remained stable  throughout the study. Radiation dose for this scan was reduced using  automated exposure control, adjustment of the mA and/or kV according  to patient size, or iterative reconstruction technique.    FLUOROSCOPY TIME: 1.2 minutes    RADIATION DOSE: 37.43 mGy Air Kerma    MEDICATIONS: None    SEDATION TIME: None    CONTRAST: 20 cc Isovue      Impression     IMPRESSION:  IVC filter placed as above.    JESSICA WAGGONER MD   US Abdomen Limited w Abd/Pelvis Duplex Complete    Narrative    US ABDOMEN LIMITED WITH DOPPLER COMPLETE  4/16/2021 11:34 AM    HISTORY: Please determine if there is a portal vein thrombosis.  Abdominal pain.    COMPARISON: 4/14/2021    FINDINGS: The liver is unremarkable, without evidence for hepatic mass  or fatty infiltration. The gallbladder is unremarkable, without  evidence of cholelithiasis. Patient is nontender over the gallbladder.  No intra or extrahepatic bile duct dilatation. Pancreas is partially  obscured by overlying bowel gas, but appears unremarkable where seen.  Right kidney is normal. No hydronephrosis. Small right pleural  effusion. Again noted there is a complex fluid collection inferior to  the liver, unchanged. Trace ascites.    DOPPLER:    The IVC is segmentally seen, and are of normal caliber where  visualized.    Hepatic artery is patent with antegrade flow.    Main, left and right portal veins are patent with antegrade flow.  Splenic vein at the pancreatic tail is patent with antegrade flow.    Left, middle and right hepatic veins are patent with antegrade flow.      Impression    IMPRESSION:  1. Portal veins, hepatic veins, splenic vein and hepatic artery are  all patent with antegrade flow.  2. Small right pleural effusion and trace ascites.  3. Complex fluid collection inferior to the right lobe of the liver  consistent with hematoma is similar to the prior CT.    CASTILLO DEJESUS DO   CTA Abdomen Pelvis with Contrast    Narrative    CTA ABDOMEN PELVIS WITH CONTRAST  4/16/2021 11:49 AM     HISTORY:  Retroperitoneal hematoma.    COMPARISON: CT of the abdomen pelvis dated 4/14/2021    TECHNIQUE: CT angiogram of the abdomen and pelvis was performed  following the administration of 80 mL intravenous contrast. Images are  viewed in multiple planes and 3-D reconstructions were also performed.  Radiation dose for this scan was  reduced using automated exposure  control, adjustment of the mA and/or kV according to patient size, or  iterative reconstruction technique.    FINDINGS: Again identified is a large right retroperitoneal hematoma.  This now measures approximately 16 x 11 cm in maximum craniocaudad and  transverse dimensions versus 12 x 11 cm on the previous exam. There is  increased extension of this hematoma to the right iliacus muscle and  right psoas muscle. There continues to be mass effect on the adjacent  right kidney which is displaced anteriorly and to the left. There is  likely some extension into the peritoneal space adjacent to the  inferior right lower liver. Small amount of hemoperitoneum in this  region. There also appears to be a small amount of mildly dense  ascites in the dependent pelvis. There is no evidence for active  extravasation.    A left rectus sheath hematoma is unchanged.    The abdominal aorta is of normal caliber without aneurysmal dilatation  or significant stenosis. The visceral branches of the abdominal aorta  are patent without significant stenoses. The iliac arteries and  proximal femoral arteries are patent without significant stenoses.    Again identified are subcentimeter cystic lesions in the kidneys. One  of these located at the posterior interpolar region of the left kidney  is hyperdense and does not meet strict criteria for simple cyst.  Remaining solid organs appear grossly unremarkable.    Again identified are scattered colonic diverticuli. These are most  concentrated in the sigmoid colon.    Again identified are small hematomas in the anterior abdominal wall  most likely from injections.    There is a new IVC filter.    There is a new small right pleural effusion with adjacent atelectasis.  Probable atelectatic changes at the left lung base.      Impression    IMPRESSION:  1. Interval increase in size of a right retroperitoneal hematoma with  probable extension into the peritoneal space  adjacent to the right  lobe of the liver. In addition, small amount of dense ascites likely  representing blood is noted within the dependent pelvis. There remains  significant mass effect on the adjacent right kidney by the hematoma.  2. Stable left rectus abdominous hematoma.  3. Small right pleural effusion with adjacent atelectasis. Probable  left basilar atelectasis.  4. Sigmoid diverticulosis.    JESSICA WAGGONER MD

## 2021-04-17 NOTE — PROGRESS NOTES
"[facility]    Infectious Disease Progress Note    Date of Service (when I saw the patient): 04/17/2021     Assessment & Plan   Olga Bailey is a 75 year old female who was admitted on 3/26/2021.     Impression:  1. 75 y.o with recent diagnosis of craniotomy and resection of glioblastoma multiforme.   2. Has been on very high dose steroid taper for the past month PTA  since the craniotomy. Not on any bactrim prophylaxis before admission.    3. Admitted with shortness of breath.   4. Found to have bilateral ground glass infiltrates on the imaging, very hypoxic. Concern for PJP. Never able to give sputum for sample. COVID PCR negative x 3.   5. Lactic acid returns to normal with improvement in LFT which makes ischemia likely the source     Recommendations:   CT abdomen with new retroperitoneal bleed, was started on zosyn for leucocytosis and lactic acidosis, procal negative, which could be because of the bleed and the steroids, none the less no objection to it as we clarify the situation.     High suspicion for PJP pneumonia. LDH high, high beta d glucan unable to confirm with the sputum studies as patient has been unable to provide any sputum   Steroids for PJP continue to taper to be off steroids in next few days. Discussed with Im who also ran the course by neuro-onc.  Day 19/21 septra was improving very well before this past 24- 36 hours with new bleed            Per neuro onc last note her cancer related plan was as listed in the note below, I would recommend follow up with Neuro onc after discharge.    I am copying and pasting Neuro onc last note below:   \" PLAN  -CANCER-DIRECTED THERAPY-  Will initiate chemoradiotherapy with temozolomide 75mg/m2 (140mg).   -Instructed to start temozolomide the evening before the start date of radiation and continue daily until the completion of radiation.   -Take temozolomide on an empty stomach, 30 minutes after Zofran dosing.  -Additional " "temozolomide teaching performed by RN/ pharmacy staff.      -Initial labs ordered; CBC with differential, CMP, Hepatitis B serologies; NR.  -Will continue weekly CBC and repeat CMP at follow-up.     -Medications prescribed;        -Zofran 4mg (1 to 2 tabs qHS 30 minutes prior to chemotherapy and then PRN nausea).       -For lymphopenia, prophylactic antibiotics are recommended during concurrent treatment. Will start Sulfamethoxazole-trimethoprim (Bactrim) 800 mg-160 mg; 1 tab orally Monday, Wednesday, and Friday for pneumocystis prophylaxis. If thrombocytopenia becomes an issue, can change to Dapsone, Atovaquone, or Pentamidine.        -Docusate 100 mg; 1 cap orally 3 times a day (stool softener for constipation)       -Senna 8.6 m tabs orally at bedtime (laxative as needed for constipation)     -STEROIDS-  -Continue to wean dexamethasone as tolerated;                           4mg daily (today - 3/28)                          2mg daily (3/29 - )                          2mg on , , , then stop.   -Dose may increase while undergoing radiation.\"          Robyn Branham MD    Interval History    Slightly more awake today   Daughter bedside     RRT 2 days ago : Spoke with Jailyn Hubbard who was called on rapid response -- patient was up to the bathroom after enema, bowels moved twice per nurse, then started jerking and less responsive, but nurse was still able to communicate with her, but more lethargic    Physical Exam   Temp: 97.8  F (36.6  C) Temp src: Oral BP: (!) 142/80 Pulse: 78   Resp: 24 SpO2: 93 % O2 Device: Nasal cannula Oxygen Delivery: 2 LPM  Vitals:    21 0656 04/15/21 0600 21 0650   Weight: 63.8 kg (140 lb 10.5 oz) 68.2 kg (150 lb 5.7 oz) 74.2 kg (163 lb 9.3 oz)     Vital Signs with Ranges  Temp:  [97.5  F (36.4  C)-98.9  F (37.2  C)] 97.8  F (36.6  C)  Pulse:  [75-96] 78  Resp:  [18-32] 24  BP: (119-142)/(66-83) 142/80  SpO2:  [91 %-95 %] 93 %    Constitutional: Awake, " alert  Lungs: diminished bilateral   Cardiovascular: Regular rate and rhythm, normal S1 and S2, and no murmur noted  Abdomen: Normal bowel sounds, soft, non-distended, non-tender  Skin: No rashes, no cyanosis, no edema  Other:    Medications     sodium chloride 50 mL/hr at 04/17/21 0014       acyclovir   Topical Q3H     amLODIPine  10 mg Oral Daily     busPIRone HCl  30 mg Oral BID     FLUoxetine  80 mg Oral Daily     insulin aspart  1-7 Units Subcutaneous TID AC     insulin aspart  1-5 Units Subcutaneous At Bedtime     levETIRAcetam  500 mg Oral BID     metoprolol tartrate  12.5 mg Oral BID     pantoprazole  40 mg Oral BID AC     predniSONE  20 mg Oral Daily     senna-docusate  2 tablet Oral BID    Or     senna-docusate  2 tablet Oral BID     sodium chloride (PF)  3 mL Intracatheter Q8H     sodium chloride (PF)  3 mL Intracatheter Q8H     sulfamethoxazole-trimethoprim  2 tablet Oral BID       Data   All microbiology laboratory data reviewed.  Recent Labs   Lab Test 04/17/21  0556 04/17/21  0014 04/16/21  1800 04/16/21  0615 04/15/21  0530 04/15/21  0530   WBC 12.4*  --   --  13.5*  --  15.3*   HGB 7.2* 7.8* 8.3* 6.8*   < > 8.2*   HCT 21.8*  --   --  20.9*  --  25.2*   MCV 87  --   --  84  --  86   PLT 87*  --   --  102*  --  118*    < > = values in this interval not displayed.     Recent Labs   Lab Test 04/17/21  0556 04/16/21  0615 04/15/21  0530   CR 0.54 0.80 0.64     No lab results found.  Recent Labs   Lab Test 03/29/21 1951 03/29/21 1946   CULT No growth No growth       Attestation:  Total time on the floor involved in the patient's care: 35 minutes. Total time spent in counseling/care coordination: >50%

## 2021-04-17 NOTE — PROGRESS NOTES
Vascular Surgery Progress Note    S: Sleepy but responsive and answers questions appropriately.  Appears to be comfortable.  Denies any significant abdominal or flank pain.    O:   Vitals:  BP  Min: 113/73  Max: 137/79  Temp  Av.3  F (36.8  C)  Min: 97.5  F (36.4  C)  Max: 98.9  F (37.2  C)  Pulse  Av.7  Min: 75  Max: 96  I/O last 3 completed shifts:  In: 2434 [P.O.:310; I.V.:1824]  Out: 950 [Urine:950]    Physical Exam: Breathing easily.     Abdomen= bruising.  Nontender.    Reviewed the last evening CT with large right retroperitoneal hematoma.      Hemoglobin= 7.2 (8.3 last evening after blood transfusion)    SCr= 0.54         Assessment/Plan: #1.  Spontaneous right retroperitoneal hematoma.  Required transfusion.  Hemodynamically stable but hemoglobin did drift down.  1 unit of packed red blood cells ordered which is appropriate.  Continue to follow.             #2.  Lower extremity DVTs.  IVC filter placed.             #3.  Watch urine output.  Appears to be slightly dehydrated.  If she does not eat/drink fluids today would increase IV fluids.      Wm. Dionne MD

## 2021-04-17 NOTE — PROGRESS NOTES
[facility]    Hospitalist Progress Note  Interval History   Doing better and LFT trending down.    All other review of systems are negative.    Assessment & Plan   Olga Bailey is a 75 year old female who was admitted on 3/26/2021. Who underwent recent craniotomy and resection of glioblastoma multiforme complicated by acute bilateral pneumonia, lower extermitiy DVT started on lovenox w/ complication of retroperitoneal bleed, ileus secondary to retroperitoneal bleed, epistaxis, acute on chronic anemia. GI consulted AST over 1000. Previously normal.    -form review last normal 4/14/21 which not checked on 4/15 but lactic acid elevated to 4, then to the event today which her AST in 1013 and ALT of 1463 w/ normal Tbili (no ALP)  -this is cellular damage pattern which means direct damage to liver parenchyma, usually in this elevation is mostly due to thing such as ischemia, venous thrombosis, or acute hepatitis  -pt's CK and LDH normal which makes rhabdomyolysis unlikely  -abd US showing no portal or SVC thrombosis  -I am leaning toward there is an undocumented event on 4/15 leading to elevation in lactic acid and hypoperfusion of the liver result in elevated LFT  -today lactic acid returns to normal with improvement in LFT which makes ischemia likely the source  -will recommend daily LFT w/ lactic acid, close follow up Hgb given it trended down again  -discuss with pt's daughter La Nena and she is up to date    93727 level 2 follow up    Code Status: Full Code    -Data reviewed today: I reviewed all new labs and imaging results over the last 24 hours.     Physical Exam   Temp: 98.4  F (36.9  C) Temp src: Axillary BP: 127/70 Pulse: 80   Resp: 19 SpO2: 92 % O2 Device: Nasal cannula Oxygen Delivery: 2 LPM  Vitals:    04/14/21 0656 04/15/21 0600 04/17/21 0650   Weight: 63.8 kg (140 lb 10.5 oz) 68.2 kg (150 lb 5.7 oz) 74.2 kg (163 lb 9.3 oz)     Vital Signs with Ranges  Temp:  [97.5  F (36.4   C)-98.9  F (37.2  C)] 98.4  F (36.9  C)  Pulse:  [75-96] 80  Resp:  [18-26] 19  BP: (113-137)/(70-83) 127/70  SpO2:  [91 %-100 %] 92 %  I/O last 3 completed shifts:  In: 2434 [P.O.:310; I.V.:1824]  Out: 950 [Urine:950]    Constitutional: Awake, alert, cooperative, no apparent distress  Respiratory: Clear to auscultation bilaterally, no crackles or wheezing  Cardiovascular: Regular rate and rhythm, normal S1 and S2, and no murmur noted  GI: Normal bowel sounds, soft, non-distended, TTP at injection site  Skin/Integumen: No rashes, no cyanosis, no edema  Other:     Edouard Sparks MD  (PierceSanjuana  [facility]  Office: [phone]  Cell: [phone], please feel free to call the cell    Medications     sodium chloride 50 mL/hr at 04/17/21 0014       acyclovir   Topical Q3H     amLODIPine  10 mg Oral Daily     busPIRone HCl  30 mg Oral BID     FLUoxetine  80 mg Oral Daily     insulin aspart  1-7 Units Subcutaneous TID AC     insulin aspart  1-5 Units Subcutaneous At Bedtime     levETIRAcetam  500 mg Oral BID     metoprolol tartrate  12.5 mg Oral BID     pantoprazole  40 mg Oral BID AC     predniSONE  20 mg Oral Daily     senna-docusate  2 tablet Oral BID    Or     senna-docusate  2 tablet Oral BID     sodium chloride (PF)  3 mL Intracatheter Q8H     sodium chloride (PF)  3 mL Intracatheter Q8H     sulfamethoxazole-trimethoprim  2 tablet Oral BID       Data   Recent Labs   Lab 04/17/21  0556 04/17/21  0014 04/16/21  1800 04/16/21  0815 04/16/21  0615 04/15/21  0530 04/15/21  0530 04/14/21  2150 04/14/21  2150 04/14/21  1627   WBC 12.4*  --   --   --  13.5*  --  15.3*  --   --  16.1*   HGB 7.2* 7.8* 8.3*  --  6.8*   < > 8.2*   < > 7.2* 7.3*   MCV 87  --   --   --  84  --  86  --   --  90   PLT 87*  --   --   --  102*  --  118*  --   --  187   INR  --   --   --  1.18*  --   --   --   --   --   --      --   --   --  129*  --  128*  --   --  130*   POTASSIUM 4.4  --   --   --  4.9  --  5.3  --    --  5.3   CHLORIDE 105  --   --   --  100  --  99  --   --  98   CO2 24  --   --   --  21  --  21  --   --  25   BUN 29  --   --   --  37*  --  40*  --   --  42*   CR 0.54  --   --   --  0.80  --  0.64  --   --  0.79   ANIONGAP 5  --   --   --  8  --  8  --   --  7   ESTIVEN 8.2*  --   --   --  7.6*  --  7.5*  --   --  8.8   GLC 79  --   --   --  200*  --  289*  --   --  240*   ALBUMIN 2.0*  --   --   --  1.9*  --   --   --   --  2.6*   PROTTOTAL 4.9*  --   --   --  4.2*  --   --   --   --  5.4*   BILITOTAL 0.2  --   --   --  0.2  --   --   --   --  0.2   ALKPHOS 63  --   --   --   --   --   --   --   --  88   *  --   --   --  1,463*  --   --   --   --  61*   *  --   --   --  1,013*  --   --   --   --  22   TROPI  --   --   --   --   --   --   --   --  0.021 <0.015    < > = values in this interval not displayed.       Recent Results (from the past 24 hour(s))   IR IVC Filter Placement    Narrative    IVC FILTER PLACEMENT 4/16/2021 10:45 AM    CLINICAL HISTORY: Patient has a history of glioblastoma and lower  extremity DVT. Patient developed a large right retroperitoneal  hematoma on anticoagulation. Anticoagulation has been held. IVC filter  was requested for pulmonary embolism protection.    DESCRIPTION OF PROCEDURE: After obtaining informed consent, the  patient was placed in a supine position on the fluoroscopy table. The  right groin was prepped and draped in the usual sterile manner. 1%  lidocaine was injected for local anesthesia. Ultrasound was used to  evaluate and document patency of the right femoral vein. Under sterile  ultrasound guidance, a puncture was made into the femoral vein. A  permanent copy image was obtained for the patient's records.    An 8.5 Kazakh vascular sheath was placed and taken into the lower IVC.  A venogram was performed. The renal vein inflow was identified. No  thrombus in the IVC was identified.     A retrievable Barber filter was placed with apex positioned at  the  level of the renal vein inflow. The right femoral sheath was pulled.  Pressure was held at the puncture site for 7 minutes with good  hemostasis.    The patient tolerated the procedure well. There were no immediate  postprocedure complications. The patient's vital signs were monitored  by radiology nursing staff under my supervision and remained stable  throughout the study. Radiation dose for this scan was reduced using  automated exposure control, adjustment of the mA and/or kV according  to patient size, or iterative reconstruction technique.    FLUOROSCOPY TIME: 1.2 minutes    RADIATION DOSE: 37.43 mGy Air Kerma    MEDICATIONS: None    SEDATION TIME: None    CONTRAST: 20 cc Isovue      Impression    IMPRESSION:  IVC filter placed as above.    JESSICA WAGGONER MD   US Abdomen Limited w Abd/Pelvis Duplex Complete    Narrative    US ABDOMEN LIMITED WITH DOPPLER COMPLETE  4/16/2021 11:34 AM    HISTORY: Please determine if there is a portal vein thrombosis.  Abdominal pain.    COMPARISON: 4/14/2021    FINDINGS: The liver is unremarkable, without evidence for hepatic mass  or fatty infiltration. The gallbladder is unremarkable, without  evidence of cholelithiasis. Patient is nontender over the gallbladder.  No intra or extrahepatic bile duct dilatation. Pancreas is partially  obscured by overlying bowel gas, but appears unremarkable where seen.  Right kidney is normal. No hydronephrosis. Small right pleural  effusion. Again noted there is a complex fluid collection inferior to  the liver, unchanged. Trace ascites.    DOPPLER:    The IVC is segmentally seen, and are of normal caliber where  visualized.    Hepatic artery is patent with antegrade flow.    Main, left and right portal veins are patent with antegrade flow.  Splenic vein at the pancreatic tail is patent with antegrade flow.    Left, middle and right hepatic veins are patent with antegrade flow.      Impression    IMPRESSION:  1. Portal veins, hepatic  veins, splenic vein and hepatic artery are  all patent with antegrade flow.  2. Small right pleural effusion and trace ascites.  3. Complex fluid collection inferior to the right lobe of the liver  consistent with hematoma is similar to the prior CT.    CASTILLO DEJESUS DO   CTA Abdomen Pelvis with Contrast    Narrative    CTA ABDOMEN PELVIS WITH CONTRAST  4/16/2021 11:49 AM     HISTORY:  Retroperitoneal hematoma.    COMPARISON: CT of the abdomen pelvis dated 4/14/2021    TECHNIQUE: CT angiogram of the abdomen and pelvis was performed  following the administration of 80 mL intravenous contrast. Images are  viewed in multiple planes and 3-D reconstructions were also performed.  Radiation dose for this scan was reduced using automated exposure  control, adjustment of the mA and/or kV according to patient size, or  iterative reconstruction technique.    FINDINGS: Again identified is a large right retroperitoneal hematoma.  This now measures approximately 16 x 11 cm in maximum craniocaudad and  transverse dimensions versus 12 x 11 cm on the previous exam. There is  increased extension of this hematoma to the right iliacus muscle and  right psoas muscle. There continues to be mass effect on the adjacent  right kidney which is displaced anteriorly and to the left. There is  likely some extension into the peritoneal space adjacent to the  inferior right lower liver. Small amount of hemoperitoneum in this  region. There also appears to be a small amount of mildly dense  ascites in the dependent pelvis. There is no evidence for active  extravasation.    A left rectus sheath hematoma is unchanged.    The abdominal aorta is of normal caliber without aneurysmal dilatation  or significant stenosis. The visceral branches of the abdominal aorta  are patent without significant stenoses. The iliac arteries and  proximal femoral arteries are patent without significant stenoses.    Again identified are subcentimeter cystic lesions  in the kidneys. One  of these located at the posterior interpolar region of the left kidney  is hyperdense and does not meet strict criteria for simple cyst.  Remaining solid organs appear grossly unremarkable.    Again identified are scattered colonic diverticuli. These are most  concentrated in the sigmoid colon.    Again identified are small hematomas in the anterior abdominal wall  most likely from injections.    There is a new IVC filter.    There is a new small right pleural effusion with adjacent atelectasis.  Probable atelectatic changes at the left lung base.      Impression    IMPRESSION:  1. Interval increase in size of a right retroperitoneal hematoma with  probable extension into the peritoneal space adjacent to the right  lobe of the liver. In addition, small amount of dense ascites likely  representing blood is noted within the dependent pelvis. There remains  significant mass effect on the adjacent right kidney by the hematoma.  2. Stable left rectus abdominous hematoma.  3. Small right pleural effusion with adjacent atelectasis. Probable  left basilar atelectasis.  4. Sigmoid diverticulosis.    MD Edouard ANDRADE MD (Richard)  [facility]  Office: [phone]  Cell: [phone]

## 2021-04-17 NOTE — PLAN OF CARE
Vital signs stable on 2 L nasal cannula. Alert and oriented overnight. Lung sounds diminished. Bowel sounds active with moderate distention (no change), flatus present. Patient denies pain. Bedrest this shift. Tolerating nectar thick with pills. CMS/neuros intact. Voiding to purewick. Tele normal sinus. Turn and repo q2h.

## 2021-04-17 NOTE — PLAN OF CARE
SLP:  following for speech/language tx. Reviewed medical changes/limited tolerance for aphagia tx. Daughter reported concern for aspiration. Chest x-ray pending and Dietician starting TPN this pm. Reported oral residue and difficulty with DD2 macaroni noodles. Pureed plate in room, however, pt asleep and RN administering Neb at this time. MD paged for SLP orders for swallowing. Okay to continue modified diet/liquids (DD1/nectar thick liquids) pending formal SLP evaluation.

## 2021-04-17 NOTE — PROGRESS NOTES
Clinical Nutrition Brief Note     Received Provider Order - Pharmacy/Nutrition to start & manage TPN   Please refer to previous RD assessments for more details (last note 4/15)   Discussed with Dr Rosa today -- plan to restart TPN for ~3 days until persistent ileus resolves. Do not suspect pt will require TPN as discharge  Discussed with Pharmacist -- pt previously required insulin added to TPN bag earlier this admission     Chart reviewed~  PO: patient has taken limited PO for the past 4 days. Prior to this, was eating adequately on calorie counts and meeting 100% nutrient needs   Labs: Na 134, K 4.4, Mg 2.3, Phos 2.1 (L, acceptable)   Recent Labs   Lab 04/17/21  1159 04/17/21  0556 04/16/21  2259 04/16/21  1812 04/16/21  1339 04/16/21  0738 04/16/21  0615 04/16/21  0251 04/15/21  0530 04/15/21  0530 04/14/21  1627 04/13/21  0558 04/13/21  0558 04/12/21  0630 04/12/21  0630   GLC  --  79  --   --   --   --  200*  --   --  289* 240*  --  74  --  91   *  --  93 131* 113* 150*  --  137*   < >  --   --    < >  --    < >  --     < > = values in this interval not displayed.     Medications: prednisone, senokot, IVF NaCl at 50 mL/hr (started 4/14)   Stooling: last BM recorded 4/14   Wt: 74.2 kg   Vitals:    04/10/21 0600 04/13/21 0700 04/14/21 0656 04/15/21 0600   Weight: 70.4 kg (155 lb 3.3 oz) 68.9 kg (151 lb 14.4 oz) 63.8 kg (140 lb 10.5 oz) 68.2 kg (150 lb 5.7 oz)    04/17/21 0650   Weight: 74.2 kg (163 lb 9.3 oz)       Estimated Nutrient Needs:    Dosing weight: 56.2 kg (adjusted)   Energy Needs:  8522-8386 kcals (25-30 Kcal/Kg)   Justification: overweight   Protein Needs:  67-84 grams protein (1.2-1.5 g pro/Kg)   Justification: hypercatabolism with acute illness       Plan~  TPN D15 AA5 to goal 60 mL/hr + Lipids 250 mL daily = 1522 kcals (27 kcal/kg), 72 gm pro (1.3 gm/kg), 216 gm CHO, 33% fat  - tonight begin TPN D15 AA5 at 30 mL/hr + Lipids daily  - after 24 hrs and Mg, K, Phos acceptable, advance to  goal rate of 60 mL/hr as above   - daily electrolyte checks and replacements   - total fluids: TPN or per MD VU will continue to follow patient per protocol    Sofia Coleman RD, LD  Clinical Dietitian   Weekend pager [phone]

## 2021-04-18 ENCOUNTER — APPOINTMENT (OUTPATIENT)
Dept: SPEECH THERAPY | Facility: CLINIC | Age: 76
DRG: 166 | End: 2021-04-18
Attending: INTERNAL MEDICINE
Payer: MEDICARE

## 2021-04-18 LAB
ALBUMIN SERPL-MCNC: 2.1 G/DL (ref 3.4–5)
ALP SERPL-CCNC: 69 U/L (ref 40–150)
ALT SERPL W P-5'-P-CCNC: 717 U/L (ref 0–50)
ANION GAP SERPL CALCULATED.3IONS-SCNC: 4 MMOL/L (ref 3–14)
AST SERPL W P-5'-P-CCNC: 188 U/L (ref 0–45)
BILIRUB SERPL-MCNC: 0.7 MG/DL (ref 0.2–1.3)
BUN SERPL-MCNC: 25 MG/DL (ref 7–30)
CALCIUM SERPL-MCNC: 8 MG/DL (ref 8.5–10.1)
CHLORIDE SERPL-SCNC: 100 MMOL/L (ref 94–109)
CO2 SERPL-SCNC: 28 MMOL/L (ref 20–32)
CREAT SERPL-MCNC: 0.53 MG/DL (ref 0.52–1.04)
ERYTHROCYTE [DISTWIDTH] IN BLOOD BY AUTOMATED COUNT: 16.9 % (ref 10–15)
GFR SERPL CREATININE-BSD FRML MDRD: >90 ML/MIN/{1.73_M2}
GLUCOSE BLDC GLUCOMTR-MCNC: 111 MG/DL (ref 70–99)
GLUCOSE BLDC GLUCOMTR-MCNC: 112 MG/DL (ref 70–99)
GLUCOSE BLDC GLUCOMTR-MCNC: 119 MG/DL (ref 70–99)
GLUCOSE BLDC GLUCOMTR-MCNC: 146 MG/DL (ref 70–99)
GLUCOSE BLDC GLUCOMTR-MCNC: 150 MG/DL (ref 70–99)
GLUCOSE SERPL-MCNC: 94 MG/DL (ref 70–99)
HAV IGM SERPL QL IA: NONREACTIVE
HBV CORE IGM SERPL QL IA: NONREACTIVE
HBV SURFACE AG SERPL QL IA: NONREACTIVE
HCT VFR BLD AUTO: 24.3 % (ref 35–47)
HCV AB SERPL QL IA: NONREACTIVE
HGB BLD-MCNC: 8.1 G/DL (ref 11.7–15.7)
HGB BLD-MCNC: 8.6 G/DL (ref 11.7–15.7)
MAGNESIUM SERPL-MCNC: 2 MG/DL (ref 1.6–2.3)
MCH RBC QN AUTO: 28.6 PG (ref 26.5–33)
MCHC RBC AUTO-ENTMCNC: 33.3 G/DL (ref 31.5–36.5)
MCV RBC AUTO: 86 FL (ref 78–100)
PHOSPHATE SERPL-MCNC: 1.9 MG/DL (ref 2.5–4.5)
PLATELET # BLD AUTO: 69 10E9/L (ref 150–450)
POTASSIUM SERPL-SCNC: 3.4 MMOL/L (ref 3.4–5.3)
PROT SERPL-MCNC: 5.1 G/DL (ref 6.8–8.8)
RBC # BLD AUTO: 2.83 10E12/L (ref 3.8–5.2)
RETICS # AUTO: 99.1 10E9/L (ref 25–95)
RETICS/RBC NFR AUTO: 3.5 % (ref 0.5–2)
SODIUM SERPL-SCNC: 132 MMOL/L (ref 133–144)
WBC # BLD AUTO: 9.6 10E9/L (ref 4–11)

## 2021-04-18 PROCEDURE — 250N000011 HC RX IP 250 OP 636: Performed by: HOSPITALIST

## 2021-04-18 PROCEDURE — 92526 ORAL FUNCTION THERAPY: CPT | Mod: GN | Performed by: SPEECH-LANGUAGE PATHOLOGIST

## 2021-04-18 PROCEDURE — 85018 HEMOGLOBIN: CPT | Performed by: INTERNAL MEDICINE

## 2021-04-18 PROCEDURE — 85045 AUTOMATED RETICULOCYTE COUNT: CPT | Performed by: INTERNAL MEDICINE

## 2021-04-18 PROCEDURE — 99233 SBSQ HOSP IP/OBS HIGH 50: CPT | Performed by: INTERNAL MEDICINE

## 2021-04-18 PROCEDURE — 85027 COMPLETE CBC AUTOMATED: CPT | Performed by: INTERNAL MEDICINE

## 2021-04-18 PROCEDURE — 250N000011 HC RX IP 250 OP 636: Performed by: INTERNAL MEDICINE

## 2021-04-18 PROCEDURE — 80053 COMPREHEN METABOLIC PANEL: CPT | Performed by: INTERNAL MEDICINE

## 2021-04-18 PROCEDURE — 250N000013 HC RX MED GY IP 250 OP 250 PS 637: Performed by: INTERNAL MEDICINE

## 2021-04-18 PROCEDURE — 36593 DECLOT VASCULAR DEVICE: CPT

## 2021-04-18 PROCEDURE — 250N000013 HC RX MED GY IP 250 OP 250 PS 637: Performed by: HOSPITALIST

## 2021-04-18 PROCEDURE — 250N000009 HC RX 250: Performed by: INTERNAL MEDICINE

## 2021-04-18 PROCEDURE — 83735 ASSAY OF MAGNESIUM: CPT | Performed by: INTERNAL MEDICINE

## 2021-04-18 PROCEDURE — 999N001017 HC STATISTIC GLUCOSE BY METER IP

## 2021-04-18 PROCEDURE — 120N000013 HC R&B IMCU

## 2021-04-18 PROCEDURE — 92610 EVALUATE SWALLOWING FUNCTION: CPT | Mod: GN | Performed by: SPEECH-LANGUAGE PATHOLOGIST

## 2021-04-18 PROCEDURE — 250N000012 HC RX MED GY IP 250 OP 636 PS 637: Performed by: INTERNAL MEDICINE

## 2021-04-18 PROCEDURE — 83010 ASSAY OF HAPTOGLOBIN QUANT: CPT | Performed by: INTERNAL MEDICINE

## 2021-04-18 PROCEDURE — 250N000009 HC RX 250: Performed by: HOSPITALIST

## 2021-04-18 PROCEDURE — 258N000003 HC RX IP 258 OP 636: Performed by: INTERNAL MEDICINE

## 2021-04-18 PROCEDURE — 84100 ASSAY OF PHOSPHORUS: CPT | Performed by: INTERNAL MEDICINE

## 2021-04-18 PROCEDURE — 999N000190 HC STATISTIC VAT ROUNDS

## 2021-04-18 RX ORDER — SULFAMETHOXAZOLE/TRIMETHOPRIM 800-160 MG
2 TABLET ORAL 2 TIMES DAILY
Status: DISCONTINUED | OUTPATIENT
Start: 2021-04-18 | End: 2021-04-19

## 2021-04-18 RX ORDER — PREDNISONE 5 MG/1
10 TABLET ORAL ONCE
Status: COMPLETED | OUTPATIENT
Start: 2021-04-18 | End: 2021-04-18

## 2021-04-18 RX ORDER — FUROSEMIDE 10 MG/ML
40 INJECTION INTRAMUSCULAR; INTRAVENOUS
Status: DISCONTINUED | OUTPATIENT
Start: 2021-04-18 | End: 2021-04-22

## 2021-04-18 RX ADMIN — ALTEPLASE 2 MG: 2.2 INJECTION, POWDER, LYOPHILIZED, FOR SOLUTION INTRAVENOUS at 14:37

## 2021-04-18 RX ADMIN — FUROSEMIDE 40 MG: 10 INJECTION, SOLUTION INTRAMUSCULAR; INTRAVENOUS at 17:17

## 2021-04-18 RX ADMIN — BUSPIRONE HYDROCHLORIDE 30 MG: 15 TABLET ORAL at 08:56

## 2021-04-18 RX ADMIN — ASCORBIC ACID, VITAMIN A PALMITATE, CHOLECALCIFEROL, THIAMINE HYDROCHLORIDE, RIBOFLAVIN-5 PHOSPHATE SODIUM, PYRIDOXINE HYDROCHLORIDE, NIACINAMIDE, DEXPANTHENOL, ALPHA-TOCOPHEROL ACETATE, VITAMIN K1, FOLIC ACID, BIOTIN, CYANOCOBALAMIN: 200; 3300; 200; 6; 3.6; 6; 40; 15; 10; 150; 600; 60; 5 INJECTION, SOLUTION INTRAVENOUS at 22:31

## 2021-04-18 RX ADMIN — ACYCLOVIR: 50 OINTMENT TOPICAL at 04:46

## 2021-04-18 RX ADMIN — PREDNISONE 10 MG: 5 TABLET ORAL at 08:56

## 2021-04-18 RX ADMIN — SENNOSIDES AND DOCUSATE SODIUM 2 TABLET: 8.6; 5 TABLET ORAL at 20:10

## 2021-04-18 RX ADMIN — ACYCLOVIR: 50 OINTMENT TOPICAL at 06:30

## 2021-04-18 RX ADMIN — PREDNISONE 10 MG: 5 TABLET ORAL at 20:09

## 2021-04-18 RX ADMIN — SULFAMETHOXAZOLE AND TRIMETHOPRIM 2 TABLET: 800; 160 TABLET ORAL at 08:56

## 2021-04-18 RX ADMIN — ACYCLOVIR: 50 OINTMENT TOPICAL at 17:17

## 2021-04-18 RX ADMIN — SENNOSIDES AND DOCUSATE SODIUM 2 TABLET: 8.6; 5 TABLET ORAL at 08:56

## 2021-04-18 RX ADMIN — SULFAMETHOXAZOLE AND TRIMETHOPRIM 2 TABLET: 800; 160 TABLET ORAL at 20:09

## 2021-04-18 RX ADMIN — Medication 12.5 MG: at 08:56

## 2021-04-18 RX ADMIN — AMLODIPINE BESYLATE 10 MG: 10 TABLET ORAL at 08:56

## 2021-04-18 RX ADMIN — SODIUM PHOSPHATE, MONOBASIC, MONOHYDRATE 15 MMOL: 276; 142 INJECTION, SOLUTION INTRAVENOUS at 14:25

## 2021-04-18 RX ADMIN — POLYETHYLENE GLYCOL 3350 17 G: 17 POWDER, FOR SOLUTION ORAL at 13:59

## 2021-04-18 RX ADMIN — ACYCLOVIR: 50 OINTMENT TOPICAL at 12:57

## 2021-04-18 RX ADMIN — FLUOXETINE 80 MG: 20 CAPSULE ORAL at 08:56

## 2021-04-18 RX ADMIN — FUROSEMIDE 40 MG: 10 INJECTION, SOLUTION INTRAMUSCULAR; INTRAVENOUS at 08:47

## 2021-04-18 RX ADMIN — I.V. FAT EMULSION 250 ML: 20 EMULSION INTRAVENOUS at 20:10

## 2021-04-18 RX ADMIN — BUSPIRONE HYDROCHLORIDE 30 MG: 15 TABLET ORAL at 20:09

## 2021-04-18 RX ADMIN — Medication 12.5 MG: at 20:10

## 2021-04-18 RX ADMIN — ACYCLOVIR: 50 OINTMENT TOPICAL at 09:06

## 2021-04-18 RX ADMIN — ALTEPLASE 2 MG: 2.2 INJECTION, POWDER, LYOPHILIZED, FOR SOLUTION INTRAVENOUS at 21:55

## 2021-04-18 RX ADMIN — LEVETIRACETAM 500 MG: 500 TABLET, FILM COATED ORAL at 20:09

## 2021-04-18 RX ADMIN — LEVETIRACETAM 500 MG: 500 TABLET, FILM COATED ORAL at 08:56

## 2021-04-18 ASSESSMENT — ACTIVITIES OF DAILY LIVING (ADL)
ADLS_ACUITY_SCORE: 24
ADLS_ACUITY_SCORE: 24
ADLS_ACUITY_SCORE: 19
ADLS_ACUITY_SCORE: 18
ADLS_ACUITY_SCORE: 18
ADLS_ACUITY_SCORE: 24

## 2021-04-18 ASSESSMENT — MIFFLIN-ST. JEOR: SCORE: 1207.13

## 2021-04-18 NOTE — PROGRESS NOTES
Alteplase initiated to white port of triple lumen picc line.  Unit RN shown how to use stopcock method.  If questions or concerns arise unit RN instructed to call flying squad.

## 2021-04-18 NOTE — PROGRESS NOTES
House [facility] brief note:    Was paged by nursing to evaluate pt for new onset lower quadrant abdominal pain with mildly worsened distension, VSS.  Upon arrival, pt lying in bed, awake but very tired, ill appearing.  Pt reporting suprapubic abdominal pain.  Pt's abdomen round, distended, more firm with palpation in RLQ, no overt guarding or rebound tenderness noted.  Ecchymosis on RLQ present, has not extended beyond previous marked outlined boarder.  Pt's HR 70s-80s (is beta-blocked), SBP 130s-140s, RR 20s, O2 sats 90-92% on 3L O2 via oxymask, afebrile.  Recent bladder scan for 250 ml.  No recent BM, last on 4/14.      Interventions:  - Hgb previously ordered for now, will also add lactic acid  - Will obtain stat abd x-ray to evaluate for obstruction in setting of suspected ileus  - In setting of Hgb drop and no obvious bowel obstruction on xray, if pt becomes hemodynamically unstable or worsening abdominal pain/distension, will obtain stat CTA abd/pel at that time to evaluate for active extravasation   - Will order Hgb Q6H x3; noted pt has an updated T&S if pt requires  - Discussed with pt my concern for pt's overall clinical status.  Have met pt three separate times throughout the course of her hospital stay and today pt appears very fatigued, ill appearing.  Confirmed with pt that at this time, pt would like to continue with full restorative cares, FULL CODE.  Pt noted she would discuss her goals with her daughter in the morning.      KAYCE Carmona, CNP  [facility]    I spent 10 min at pt's bedside managing and coordinating pt's overall plan of care.

## 2021-04-18 NOTE — PROGRESS NOTES
[facility]    Medicine Progress Note - Hospitalist Service       Date of Admission:  3/26/2021  Assessment & Plan       75 year old female admitted on 3/26/2021.  Past medical history that is most notable for recent craniotomy and resection of glioblastoma multiforme, as well as uncomplicated asthma, who presents with dyspnea and is found to have acute bilateral pneumonia.     Acute hypoxic respiratory failure due to bilateral pneumonia, suspected PCP   -- discussed with ID, all consistent with PCP   -- Bactrim DS 2 tid for 1 more days   -- cont weaning Prednisone (on 10 mg bid today, then 10 mg daily)    Retroperitoneal bleed 2nd to Lovenox 4/14/2021 AM   -- Hgb finally stable today (hgb 8.1 this AM, and 8.6 this afternoon)     Increased SOB, suspect mild fluid overload -- increased wt 2nd to blood and IV fluids and TPN   -- CXR today with no new infiltrates   -- IV lasix bid, reduced dose as weight comes down    Persistent Anemia -- suspect component of anemia of acute illness, and has anemia of chronic disease at baseline    -- check CBC with retic count tomorrow    Shock Liver with Elevated LFT's -- related to Retroperitoneal Bleed, resolving    Thrombocytopenia -- suspect consumption from bleeding, continues to drop    -- follow CBC     Leukocytosis --suspect stress related, now resolved     Retroperitoneal bleed 2nd to Lovenox 4/14/2021 AM   -- Abd Pelvis CTA  without evidence of ongoing bleed     Ileus 2nd to bleed -- persistent, probable 2nd to retroperitoneal bleed   -- will restart TPN until taking adequate PO    Abd and Right Flank Pain -- 2nd to bleed    Bilateral Lower Leg DVT on 3/29, hypercoagulable 2nd to GBM   -- off Lovenox (last dose 4/14 AM) -- IR placed IVC filter 4/16   -- consider Lovenox 40 mg subcutaneous in several days if hgb stable      Epistaxis   -- now stable, off lovenox     Hyponatremia  -- mild, 2nd to SIADH from stress and pulm process     Steroid induced  hyperglycemia -- resolved   -- restart insulin as need     Urinary incontinence -- has pure wick     Hypokalemia, resolved     GBM s/p resection 2/23/21  Followed by Dr. Baker of Neuro-Oncology.  She is in process of being set up to start chemotherapy and XRT possibly this Week  - has not yet initiated chemo (temozolomide) and planned radiation  - Family agrees on 2nd opinion with [facility] concerning GBM treatment, [facility] requested Dr. Stephanie Baker consult [facility] to initiate process (the Patient's Ex-,  Renaldo Dow, is eager to hear what they recommend)     Hypertension  - continue PTA amlodipine     Sinus tachycardia -- clinically improved  - metoprolol 12.5 mg bid.      Asthma  MARCELLE  - nebs prn  - resume CPAP with home setting 4/9     Depression and anxiety  Insomnia  - continue PTA Prozac and Buspar  - continue PTA Atarax for sleep  - prn lorazepam     GERD  - switch to PPI as above     Chronic anemia of chronic disease, with Acute blood loss  Baseline hgb 10-11 g/dL, stable at 8     Non-severe malnutrition  Noted decrease oral intake and drop in albumin, subcutaneous fat loss on exam.   - PICC line in place  - excellent oral intake, tapered off TPN 4/9       Diet: Room Service  Advance Diet as Tolerated  parenteral nutrition - Clinimix E  parenteral nutrition - Clinimix E  Combination Diet Full Liquid Diet; Nectar Thickened Liquids (pre-thickened or use instant food thickener) (spoon or cup)  Snacks/Supplements Adult: Magic Cup; Between Meals    DVT Prophylaxis: Enoxaparin (Lovenox) SQ  Martino Catheter: not present  Code Status: Full Code           Disposition Plan   Here several days until stable to discharge (to ?home with daughter vs TCU)     Bonilla Tian MD  Hospitalist Service  [facility]  Contact information available via [facility] Paging/Directory    (35 min total)    ______    Interval History   No flatus yet, Weight is up 13 lbs the last 2 days --  suspect it is real (after 4 unit of blood), will continue IV lasix.  Is on TPN, no appetite yet, previous right back pain improved, and up in chair for 1st time in 4 days.     Physical Exam   Vital Signs: Temp: 98.1  F (36.7  C) Temp src: Axillary BP: 131/76 Pulse: 80   Resp: 27 SpO2: 91 % O2 Device: Nasal cannula Oxygen Delivery: 5 LPM  Weight: 163 lbs 12.83 oz    Gen: NAD, pleasant, elderly, frail  Resp: slight crackles at base, wt 163 (?up 13 lbs ?if accurate)  CV: S1S2 heard, reg rhythm, reg rate  Abdo: soft, mild right sided tenderness, some bowel sounds, mildly distended, ecchymosis on right flank   Ext: calves nontender, well perfused, no ankle edema  Neuro: Alert but weak, some talking, CN grossly intact, no facial asymmetry      Data   Recent Labs   Lab 04/18/21  0549 04/17/21  2341 04/17/21  1430 04/17/21  0556 04/16/21  0815 04/16/21  0815 04/16/21  0615 04/14/21  2150 04/14/21  2150 04/14/21  1627   WBC 9.6  --   --  12.4*  --   --  13.5*   < >  --  16.1*   HGB 8.1* 7.6* 8.9* 7.2*   < >  --  6.8*   < > 7.2* 7.3*   MCV 86  --   --  87  --   --  84   < >  --  90   PLT 69*  --   --  87*  --   --  102*   < >  --  187   INR  --   --   --   --   --  1.18*  --   --   --   --    *  --   --  134  --   --  129*   < >  --  130*   POTASSIUM 3.4  --   --  4.4  --   --  4.9   < >  --  5.3   CHLORIDE 100  --   --  105  --   --  100   < >  --  98   CO2 28  --   --  24  --   --  21   < >  --  25   BUN 25  --   --  29  --   --  37*   < >  --  42*   CR 0.53  --   --  0.54  --   --  0.80   < >  --  0.79   ANIONGAP 4  --   --  5  --   --  8   < >  --  7   ESTIVEN 8.0*  --   --  8.2*  --   --  7.6*   < >  --  8.8   GLC 94  --   --  79  --   --  200*   < >  --  240*   ALBUMIN 2.1*  --   --  2.0*  --   --  1.9*  --   --  2.6*   PROTTOTAL 5.1*  --   --  4.9*  --   --  4.2*  --   --  5.4*   BILITOTAL 0.7  --   --  0.2  --   --  0.2  --   --  0.2   ALKPHOS 69  --   --  63  --   --   --   --   --  88   *  --   --  949*  --    --  1,463*  --   --  61*   *  --   --  351*  --   --  1,013*  --   --  22   TROPI  --   --   --   --   --   --   --   --  0.021 <0.015    < > = values in this interval not displayed.     Recent Results (from the past 24 hour(s))   XR Chest Port 1 View    Narrative    CHEST ONE VIEW  4/17/2021 3:30 PM     HISTORY: SOB.    COMPARISON: April 5, 2021      Impression    IMPRESSION: Moderately extensive interstitial and airspace infiltrates  bilaterally similar to slightly worse than previous. Right-sided PICC  line tip in the low SVC.    NETTE HAWLEY MD   XR Abdomen Port 1 View    Narrative    EXAM: XR ABDOMEN PORT 1 VW  LOCATION: [facility]  DATE/TIME: 4/17/2021 11:49 PM    INDICATION: Abdominal pain.  COMPARISON: None.      Impression    IMPRESSION: Nonspecific bowel gas pattern with no evidence for obstruction. Moderate amounts of stool in the colon. IVC filter in place. Mild degenerative changes in the spine. Mild patchy infiltrates in the lower lungs. Remainder unremarkable.

## 2021-04-18 NOTE — PLAN OF CARE
Vital signs stable on 4 L nasal cannula. Alert and oriented overnight but more lethargic. Lung sounds diminished. Bowel sounds active with moderate distention and new pain (MD aware), flatus negative. Patient endorses lower abdominal pain that resolved with heat and re postioning. Bedrest this shift. Tolerating nectar thick with pills otherwise NPO until speech consult. CMS/neuros intact. Voiding to purewick. Tele normal sinus. Turn and repo q2h. TPN and lipids started overnight.

## 2021-04-18 NOTE — PROGRESS NOTES
[facility]    Hospitalist Progress Note  Interval History   Doing well w/o any complaint when seen, LFT coming down. Daughter at the bedside.    All other review of systems are negative.    Assessment & Plan   Olga Bailey is a 75 year old female who was admitted on 3/26/2021. Who underwent recent craniotomy and resection of glioblastoma multiforme complicated by acute bilateral pneumonia, lower extermitiy DVT started on lovenox w/ complication of retroperitoneal bleed, ileus secondary to retroperitoneal bleed, epistaxis, acute on chronic anemia. GI consulted AST over 1000. Previously normal.    -form review last normal 4/14/21 which not checked on 4/15 but lactic acid elevated to 4, then to the event today which her AST in 1013 and ALT of 1463 w/ normal Tbili (no ALP)  -this is cellular damage pattern which means direct damage to liver parenchyma, usually in this elevation is mostly due to thing such as ischemia, venous thrombosis, or acute hepatitis  -pt's CK and LDH normal which makes rhabdomyolysis unlikely  -abd US showing no portal or SVC thrombosis  -likely ischemic damage to liver which resolving now given LFT doing better  -daily LFT    58548 level 2 follow up      Code Status: Full Code    -Data reviewed today: I reviewed all new labs and imaging results over the last 24 hours.     Physical Exam   Temp: 98.1  F (36.7  C) Temp src: Axillary BP: 124/75 Pulse: 83   Resp: 29 SpO2: 90 % O2 Device: Nasal cannula Oxygen Delivery: 5 LPM  Vitals:    04/15/21 0600 04/17/21 0650 04/18/21 0600   Weight: 68.2 kg (150 lb 5.7 oz) 74.2 kg (163 lb 9.3 oz) 74.3 kg (163 lb 12.8 oz)     Vital Signs with Ranges  Temp:  [98.1  F (36.7  C)-98.7  F (37.1  C)] 98.1  F (36.7  C)  Pulse:  [75-90] 83  Resp:  [19-34] 29  BP: (120-154)/(72-89) 124/75  SpO2:  [89 %-94 %] 90 %  I/O last 3 completed shifts:  In: 1320 [P.O.:170; I.V.:350]  Out: 1650 [Urine:1650]    Constitutional: Awake, alert, cooperative,  no apparent distress  Respiratory: Clear to auscultation bilaterally, no crackles or wheezing  Cardiovascular: Regular rate and rhythm, normal S1 and S2, and no murmur noted  GI: Normal bowel sounds, soft, non-distended, TTP at lovenox injection site  Skin/Integumen: No rashes, no cyanosis, no edema  Other:     Edouard Sparks MD  (Tommy  [facility]  Office: [phone]  Cell: [phone], please feel free to call the cell    Medications     dextrose       parenteral nutrition - Clinimix E       parenteral nutrition - Clinimix E 30 mL/hr at 04/17/21 2014       acyclovir   Topical Q3H     amLODIPine  10 mg Oral Daily     busPIRone HCl  30 mg Oral BID     FLUoxetine  80 mg Oral Daily     furosemide  40 mg Intravenous BID     insulin aspart  1-12 Units Subcutaneous Q4H     levETIRAcetam  500 mg Oral BID     lipids  250 mL Intravenous Q24H     metoprolol tartrate  12.5 mg Oral BID     pantoprazole  40 mg Oral BID AC     predniSONE  10 mg Oral Daily     senna-docusate  2 tablet Oral BID    Or     senna-docusate  2 tablet Oral BID     sodium chloride (PF)  3 mL Intracatheter Q8H     sodium phosphate  15 mmol Intravenous Once     sulfamethoxazole-trimethoprim  2 tablet Oral BID       Data   Recent Labs   Lab 04/18/21  1300 04/18/21  0549 04/17/21  2341 04/17/21  0556 04/17/21  0556 04/16/21  0815 04/16/21  0815 04/16/21  0615 04/14/21  2150 04/14/21  2150 04/14/21  1627   WBC  --  9.6  --   --  12.4*  --   --  13.5*   < >  --  16.1*   HGB 8.6* 8.1* 7.6*   < > 7.2*   < >  --  6.8*   < > 7.2* 7.3*   MCV  --  86  --   --  87  --   --  84   < >  --  90   PLT  --  69*  --   --  87*  --   --  102*   < >  --  187   INR  --   --   --   --   --   --  1.18*  --   --   --   --    NA  --  132*  --   --  134  --   --  129*   < >  --  130*   POTASSIUM  --  3.4  --   --  4.4  --   --  4.9   < >  --  5.3   CHLORIDE  --  100  --   --  105  --   --  100   < >  --  98   CO2  --  28  --   --  24  --   --  21   <  >  --  25   BUN  --  25  --   --  29  --   --  37*   < >  --  42*   CR  --  0.53  --   --  0.54  --   --  0.80   < >  --  0.79   ANIONGAP  --  4  --   --  5  --   --  8   < >  --  7   ESTIVEN  --  8.0*  --   --  8.2*  --   --  7.6*   < >  --  8.8   GLC  --  94  --   --  79  --   --  200*   < >  --  240*   ALBUMIN  --  2.1*  --   --  2.0*  --   --  1.9*  --   --  2.6*   PROTTOTAL  --  5.1*  --   --  4.9*  --   --  4.2*  --   --  5.4*   BILITOTAL  --  0.7  --   --  0.2  --   --  0.2  --   --  0.2   ALKPHOS  --  69  --   --  63  --   --   --   --   --  88   ALT  --  717*  --   --  949*  --   --  1,463*  --   --  61*   AST  --  188*  --   --  351*  --   --  1,013*  --   --  22   TROPI  --   --   --   --   --   --   --   --   --  0.021 <0.015    < > = values in this interval not displayed.       Recent Results (from the past 24 hour(s))   XR Chest Port 1 View    Narrative    CHEST ONE VIEW  4/17/2021 3:30 PM     HISTORY: SOB.    COMPARISON: April 5, 2021      Impression    IMPRESSION: Moderately extensive interstitial and airspace infiltrates  bilaterally similar to slightly worse than previous. Right-sided PICC  line tip in the low SVC.    NETTE HAWLEY MD   XR Abdomen Port 1 View    Narrative    EXAM: XR ABDOMEN PORT 1 VW  LOCATION: [facility]  DATE/TIME: 4/17/2021 11:49 PM    INDICATION: Abdominal pain.  COMPARISON: None.      Impression    IMPRESSION: Nonspecific bowel gas pattern with no evidence for obstruction. Moderate amounts of stool in the colon. IVC filter in place. Mild degenerative changes in the spine. Mild patchy infiltrates in the lower lungs. Remainder unremarkable.         Edouard Sparks MD  ([facility]  Office:   Cell: [phone]

## 2021-04-18 NOTE — PROGRESS NOTES
"[facility]    Infectious Disease Progress Note    Date of Service (when I saw the patient): 04/18/2021     Assessment & Plan   Olga Bailey is a 75 year old female who was admitted on 3/26/2021.     Impression:  1. 75 y.o with recent diagnosis of craniotomy and resection of glioblastoma multiforme.   2. Has been on very high dose steroid taper for the past month PTA  since the craniotomy. Not on any bactrim prophylaxis before admission.    3. Admitted with shortness of breath.   4. Found to have bilateral ground glass infiltrates on the imaging, very hypoxic. Concern for PJP. Never able to give sputum for sample. COVID PCR negative x 3.   5. Lactic acid returns to normal with improvement in LFT which makes ischemia likely the source     Recommendations:   CT abdomen with new retroperitoneal bleed, was started on zosyn for leucocytosis and lactic acidosis, procal negative, which could be because of the bleed and the steroids now off, leucocytosis is improved.     High suspicion for PJP pneumonia. LDH high, high beta d glucan unable to confirm with the sputum studies as patient has been unable to provide any sputum   Steroids for PJP continue to taper to be off steroids in next few days. Discussed with Im who also ran the course by neuro-onc.  Day 20/21 septra was improving very well before this past 24- 36 hours with new bleed            Per neuro onc last note her cancer related plan was as listed in the note below, I would recommend follow up with Neuro onc after discharge.    I am copying and pasting Neuro onc last note below:   \" PLAN  -CANCER-DIRECTED THERAPY-  Will initiate chemoradiotherapy with temozolomide 75mg/m2 (140mg).   -Instructed to start temozolomide the evening before the start date of radiation and continue daily until the completion of radiation.   -Take temozolomide on an empty stomach, 30 minutes after Zofran dosing.  -Additional temozolomide teaching performed by " "RN/ pharmacy staff.      -Initial labs ordered; CBC with differential, CMP, Hepatitis B serologies; NR.  -Will continue weekly CBC and repeat CMP at follow-up.     -Medications prescribed;        -Zofran 4mg (1 to 2 tabs qHS 30 minutes prior to chemotherapy and then PRN nausea).       -For lymphopenia, prophylactic antibiotics are recommended during concurrent treatment. Will start Sulfamethoxazole-trimethoprim (Bactrim) 800 mg-160 mg; 1 tab orally Monday, Wednesday, and Friday for pneumocystis prophylaxis. If thrombocytopenia becomes an issue, can change to Dapsone, Atovaquone, or Pentamidine.        -Docusate 100 mg; 1 cap orally 3 times a day (stool softener for constipation)       -Senna 8.6 m tabs orally at bedtime (laxative as needed for constipation)     -STEROIDS-  -Continue to wean dexamethasone as tolerated;                           4mg daily ( - 3/28)                          2mg daily (3/29 - )                          2mg on , , , then stop.   -Dose may increase while undergoing radiation.\"          Robyn Branham MD    Interval History    No fever   No new complaints   Creat stable   WBC normalized     RRT 2 days ago : Spoke with Jailyn Hubbard who was called on rapid response -- patient was up to the bathroom after enema, bowels moved twice per nurse, then started jerking and less responsive, but nurse was still able to communicate with her, but more lethargic    Physical Exam   Temp: 98.1  F (36.7  C) Temp src: Axillary BP: 120/73 Pulse: 80   Resp: 30 SpO2: 90 % O2 Device: Nasal cannula Oxygen Delivery: 5 LPM  Vitals:    04/15/21 0600 21 0650 21 0600   Weight: 68.2 kg (150 lb 5.7 oz) 74.2 kg (163 lb 9.3 oz) 74.3 kg (163 lb 12.8 oz)     Vital Signs with Ranges  Temp:  [98.1  F (36.7  C)-98.7  F (37.1  C)] 98.1  F (36.7  C)  Pulse:  [75-90] 80  Resp:  [19-34] 30  BP: (120-154)/(68-89) 120/73  SpO2:  [89 %-94 %] 90 %    Constitutional: Awake, alert  Lungs: diminished " bilateral   Cardiovascular: Regular rate and rhythm, normal S1 and S2, and no murmur noted  Abdomen: Normal bowel sounds, soft, non-distended, non-tender  Skin: No rashes, no cyanosis, no edema  Other:    Medications     dextrose       parenteral nutrition - Clinimix E       parenteral nutrition - Clinimix E 30 mL/hr at 04/17/21 2014       acyclovir   Topical Q3H     amLODIPine  10 mg Oral Daily     busPIRone HCl  30 mg Oral BID     FLUoxetine  80 mg Oral Daily     furosemide  40 mg Intravenous BID     insulin aspart  1-12 Units Subcutaneous Q4H     levETIRAcetam  500 mg Oral BID     lipids  250 mL Intravenous Q24H     metoprolol tartrate  12.5 mg Oral BID     pantoprazole  40 mg Oral BID AC     predniSONE  10 mg Oral Daily     senna-docusate  2 tablet Oral BID    Or     senna-docusate  2 tablet Oral BID     sodium chloride (PF)  3 mL Intracatheter Q8H     sodium chloride (PF)  3 mL Intracatheter Q8H     sulfamethoxazole-trimethoprim  2 tablet Oral BID       Data   All microbiology laboratory data reviewed.  Recent Labs   Lab Test 04/18/21  0549 04/17/21  2341 04/17/21  1430 04/17/21  0556 04/16/21  0615 04/16/21  0615   WBC 9.6  --   --  12.4*  --  13.5*   HGB 8.1* 7.6* 8.9* 7.2*   < > 6.8*   HCT 24.3*  --   --  21.8*  --  20.9*   MCV 86  --   --  87  --  84   PLT 69*  --   --  87*  --  102*    < > = values in this interval not displayed.     Recent Labs   Lab Test 04/18/21  0549 04/17/21  0556 04/16/21  0615   CR 0.53 0.54 0.80     No lab results found.  Recent Labs   Lab Test 03/29/21  1951 03/29/21  1946   CULT No growth No growth       Attestation:  Total time on the floor involved in the patient's care: 35 minutes. Total time spent in counseling/care coordination: >50%

## 2021-04-18 NOTE — PROGRESS NOTES
04/18/21 1256   General Information   Onset of Illness/Injury or Date of Surgery 03/26/21   Referring Physician Dr. Rosa   Patient/Family Therapy Goal Statement (SLP) Agreed to POC goal.  No additional goal was stated.   Pertinent History of Current Problem Patient's PMH is significant for depression, anxiety, asthma, MARCELLE, benign meningioma (2012) who presented with several weeks of word finding/ difficulties expressing self, changes in hand writing MRI revealed left lateral frontoparietal mass with edema she was advised to present to ED was admitted to [facility] 2/17/21 underwent left parietal craniotomy and tumor resection 2/23/21.  Admitted to acute rehab 3/1/21 and discharged home on 3/8/21.  She was readmitted 3/26 for acute bilateral pneumonia.       General Observations Patient pleasant, cooperative. Daughter present - RN at [facility]   Past History of Dysphagia See previous SLP notes; DD3/DD2 diet with recent decline in function and daughter reported gulping noises after swallow; RN reported coughing 5-10 minutes after intake   Type of Evaluation   Type of Evaluation Swallow Evaluation   Oral Motor   Mucosal Quality dry;sticky   Facial Symmetry (Oral Motor)   Right Side Facial Asymmetry   (mild)   Lip Function (Oral Motor)   Comment, Lip Function (Oral Motor) low left lip sore   Vocal Quality/Secretion Management (Oral Motor)   Vocal Quality (Oral Motor)   (soft, WNL)   General Swallowing Observations   Current Diet/Method of Nutritional Intake (General Swallowing Observations, NIS) NPO   Respiratory Support (General Swallowing Observations) nasal cannula   Swallowing Recommendations   Diet Consistency Recommendations full liquid diet;nectar-thick liquids   Supervision Level for Intake 1:1 supervision needed   Mode of Delivery Recommendations bolus size, small;food moistened   Swallowing Maneuver Recommendations alternate food and liquid intake;effortful (hard) swallow   Monitoring/Assistance Required  (Eating/Swallowing) stop eating activities when fatigue is present;monitor for cough or change in vocal quality with intake;optimize oral intake to minimize need for tube feeding   Recommended Feeding/Eating Techniques (Swallow Eval) maintain upright sitting position for eating;maintain upright posture during/after eating for 30 minutes   Medication Administration Recommendations, Swallowing (SLP) pt requested meds crushed   Instrumental Assessment Recommendations VFSS (videofluroscopic swallowing study)   Comment, Swallowing Recommendations SLP: SLP following for high level language/cognitive treatment. Swallow evaluation completed per MD orders. Limited evaluation completed at this time d/t respiratory status (increased RR, inability to maintain o2 in 90s). Pt alert with clear vocal quality. Noted temporary downgrade to pureed and nectar thick liquids d/t weakness, then NPO and TPN for ileus, and is now okay to return to oral diet, but concern for aspiration given weakness. Oral motor exam WNL. Moderate sized sore on lower left lip. Pt accepted ice chips x4 with adequate mastication, good timing, effortful swallow, but no overt s/sx of aspiration. Suspect premature spillage with thin water by spoon. Pt agreeable to trial nectar thick liquids by spoon and cup. Improved ability to manage/coordinate breath and swallow by cup. Slow, but functional swallow with pudding trials and no reported globus sensation. PO trials ended d/t RR/overall fatigue/o2. RN aware. Encouraged deep breathing and RT exercises. Extensive education provided to and daughter. Despite no overt s/sx of aspiration and relatively normal swallow function clinically, Video Swallow Study indicated to rule out silent aspiration. Pt verbalized agreement and agreed that she may not be able to tolerate transfer/testing tomorrow, but would like to complete before upgrading to any dry foods or regular diet. Okay to continue oral diet for comfort/quality of  life and continued swallowing/activity tolerance with full liquids, nectar thick liquids by cup or spoon. 1:1 assist/supervision with all intake; hold when fatigued or decline in respiratory status. Will plan to check in 4/19 and complete VFSS if pt can tolerate.    SLP Therapy Assessment/Plan   Criteria for Skilled Therapeutic Interventions Met (SLP Eval) yes;treatment indicated   SLP Diagnosis Mild to moderate dysphagia   Rehab Potential (SLP Eval) good, to achieve stated therapy goals   Therapy Frequency (SLP Eval) daily   Therapy Plan Review/Discharge Plan (SLP)   Therapy Plan Review (SLP) evaluation/treatment results reviewed;care plan/treatment goals reviewed;risks/benefits reviewed;current/potential barriers reviewed;participants voiced agreement with care plan;participants included;patient;daughter   SLP Discharge Planning    SLP Discharge Recommendation (DC Rec) Acute [facility]hab [facility]-Motivated patient will benefit from intensive, interdisciplinary therapy.  Anticipate will be able to tolerate 3 hours of therapy per day   SLP Rationale for DC Rec Continue ST services for swallowing and language/cognition. Anticipate tolerance will improve as medical status improves. Pt highly motivated with strong family support.    SLP Brief overview of current status  Full liquids, nectar thick liquids by spoon or cup; 1:1 assist or supervision    Total Evaluation Time   Total Evaluation Time (Minutes) 20

## 2021-04-18 NOTE — PROGRESS NOTES
VASCULAR SURGERY    Resting comfortably through the night.  On nasal cannula oxygen      Blood pressure 135/78 pulse 80    On TPN.  Good urine output via Martino (1650 ml/24hrs)    Laboratory: K= 3.4 serum creatinine= 0.53                      Hgb= 8.1    Abdominal x-ray early this morning= unremarkable.  No SBO.    Impression: DVT and leg treated with IVC filter                      Retroperitoneal hematoma with stable                            hemoglobin after transfusion.                      Decrease hemoglobin draws to twice daily              We will sign off.      Moose Truong MD

## 2021-04-18 NOTE — PLAN OF CARE
IMC: VSS except needing 5L of O2 .Tele SR. A/Ox 2. Incisions CDI, bruised throughout abdomen and flank. Reports minor abdominal discomfort, relieved with repositioning. Up to chair for 3 hours this afternoon, tolerated well.  Blood sugars monitored/controlled. Flatus -, bowels faint. PRN miralax given. Good urine output, diuresing. LS clear. Speech following, full liquid diet, nectar thick. Hemoglobins stable. PICC line lumen occluded , therefore getting alteplase. TPN running at 30, phos replaced. CTM

## 2021-04-19 ENCOUNTER — APPOINTMENT (OUTPATIENT)
Dept: CT IMAGING | Facility: CLINIC | Age: 76
DRG: 166 | End: 2021-04-19
Attending: INTERNAL MEDICINE
Payer: MEDICARE

## 2021-04-19 ENCOUNTER — APPOINTMENT (OUTPATIENT)
Dept: GENERAL RADIOLOGY | Facility: CLINIC | Age: 76
DRG: 166 | End: 2021-04-19
Attending: NURSE PRACTITIONER
Payer: MEDICARE

## 2021-04-19 PROBLEM — K21.9 GASTROESOPHAGEAL REFLUX DISEASE WITHOUT ESOPHAGITIS: Status: ACTIVE | Noted: 2021-04-19

## 2021-04-19 PROBLEM — K56.49 OTHER IMPACTION OF INTESTINE (H): Status: ACTIVE | Noted: 2021-04-19

## 2021-04-19 PROBLEM — E87.1 HYPONATREMIA: Status: ACTIVE | Noted: 2021-04-19

## 2021-04-19 LAB
ALBUMIN SERPL-MCNC: 2.3 G/DL (ref 3.4–5)
ALP SERPL-CCNC: 76 U/L (ref 40–150)
ALT SERPL W P-5'-P-CCNC: 565 U/L (ref 0–50)
ANION GAP SERPL CALCULATED.3IONS-SCNC: 5 MMOL/L (ref 3–14)
AST SERPL W P-5'-P-CCNC: 110 U/L (ref 0–45)
BASE EXCESS BLDA CALC-SCNC: 6.3 MMOL/L
BILIRUB SERPL-MCNC: 0.3 MG/DL (ref 0.2–1.3)
BUN SERPL-MCNC: 24 MG/DL (ref 7–30)
CALCIUM SERPL-MCNC: 8.1 MG/DL (ref 8.5–10.1)
CHLORIDE SERPL-SCNC: 97 MMOL/L (ref 94–109)
CO2 SERPL-SCNC: 30 MMOL/L (ref 20–32)
CREAT SERPL-MCNC: 0.46 MG/DL (ref 0.52–1.04)
ERYTHROCYTE [DISTWIDTH] IN BLOOD BY AUTOMATED COUNT: 17.3 % (ref 10–15)
GFR SERPL CREATININE-BSD FRML MDRD: >90 ML/MIN/{1.73_M2}
GLUCOSE BLDC GLUCOMTR-MCNC: 100 MG/DL (ref 70–99)
GLUCOSE BLDC GLUCOMTR-MCNC: 138 MG/DL (ref 70–99)
GLUCOSE BLDC GLUCOMTR-MCNC: 141 MG/DL (ref 70–99)
GLUCOSE BLDC GLUCOMTR-MCNC: 150 MG/DL (ref 70–99)
GLUCOSE BLDC GLUCOMTR-MCNC: 161 MG/DL (ref 70–99)
GLUCOSE BLDC GLUCOMTR-MCNC: 167 MG/DL (ref 70–99)
GLUCOSE BLDC GLUCOMTR-MCNC: 170 MG/DL (ref 70–99)
GLUCOSE BLDC GLUCOMTR-MCNC: 174 MG/DL (ref 70–99)
GLUCOSE BLDC GLUCOMTR-MCNC: 248 MG/DL (ref 70–99)
GLUCOSE SERPL-MCNC: 136 MG/DL (ref 70–99)
HAPTOGLOB SERPL-MCNC: <3 MG/DL (ref 32–197)
HCO3 BLD-SCNC: 30 MMOL/L (ref 21–28)
HCT VFR BLD AUTO: 25.3 % (ref 35–47)
HGB BLD-MCNC: 8.5 G/DL (ref 11.7–15.7)
MAGNESIUM SERPL-MCNC: 2 MG/DL (ref 1.6–2.3)
MCH RBC QN AUTO: 29.2 PG (ref 26.5–33)
MCHC RBC AUTO-ENTMCNC: 33.6 G/DL (ref 31.5–36.5)
MCV RBC AUTO: 87 FL (ref 78–100)
O2/TOTAL GAS SETTING VFR VENT: 60 %
OXYHGB MFR BLD: 93 % (ref 92–100)
PCO2 BLD: 38 MM HG (ref 35–45)
PH BLD: 7.51 PH (ref 7.35–7.45)
PHOSPHATE SERPL-MCNC: 2 MG/DL (ref 2.5–4.5)
PLATELET # BLD AUTO: 82 10E9/L (ref 150–450)
PO2 BLD: 65 MM HG (ref 80–105)
POTASSIUM SERPL-SCNC: 3.1 MMOL/L (ref 3.4–5.3)
PROT SERPL-MCNC: 5.5 G/DL (ref 6.8–8.8)
RBC # BLD AUTO: 2.91 10E12/L (ref 3.8–5.2)
SODIUM SERPL-SCNC: 132 MMOL/L (ref 133–144)
WBC # BLD AUTO: 8.7 10E9/L (ref 4–11)

## 2021-04-19 PROCEDURE — 120N000013 HC R&B IMCU

## 2021-04-19 PROCEDURE — 250N000013 HC RX MED GY IP 250 OP 250 PS 637: Performed by: HOSPITALIST

## 2021-04-19 PROCEDURE — 82805 BLOOD GASES W/O2 SATURATION: CPT | Performed by: HOSPITALIST

## 2021-04-19 PROCEDURE — 250N000009 HC RX 250: Performed by: HOSPITALIST

## 2021-04-19 PROCEDURE — 258N000003 HC RX IP 258 OP 636: Performed by: HOSPITALIST

## 2021-04-19 PROCEDURE — 250N000011 HC RX IP 250 OP 636: Performed by: NURSE PRACTITIONER

## 2021-04-19 PROCEDURE — 250N000012 HC RX MED GY IP 250 OP 636 PS 637: Performed by: INTERNAL MEDICINE

## 2021-04-19 PROCEDURE — 250N000011 HC RX IP 250 OP 636: Performed by: HOSPITALIST

## 2021-04-19 PROCEDURE — 94640 AIRWAY INHALATION TREATMENT: CPT

## 2021-04-19 PROCEDURE — 999N000190 HC STATISTIC VAT ROUNDS

## 2021-04-19 PROCEDURE — 84132 ASSAY OF SERUM POTASSIUM: CPT | Performed by: HOSPITALIST

## 2021-04-19 PROCEDURE — 250N000011 HC RX IP 250 OP 636

## 2021-04-19 PROCEDURE — 99207 PR CDG-CODE CATEGORY CHANGED: CPT | Performed by: HOSPITALIST

## 2021-04-19 PROCEDURE — C9113 INJ PANTOPRAZOLE SODIUM, VIA: HCPCS | Performed by: HOSPITALIST

## 2021-04-19 PROCEDURE — 99232 SBSQ HOSP IP/OBS MODERATE 35: CPT | Performed by: HOSPITALIST

## 2021-04-19 PROCEDURE — 85027 COMPLETE CBC AUTOMATED: CPT | Performed by: INTERNAL MEDICINE

## 2021-04-19 PROCEDURE — 250N000013 HC RX MED GY IP 250 OP 250 PS 637: Performed by: INTERNAL MEDICINE

## 2021-04-19 PROCEDURE — 84100 ASSAY OF PHOSPHORUS: CPT | Performed by: INTERNAL MEDICINE

## 2021-04-19 PROCEDURE — 250N000011 HC RX IP 250 OP 636: Performed by: INTERNAL MEDICINE

## 2021-04-19 PROCEDURE — 83735 ASSAY OF MAGNESIUM: CPT | Performed by: INTERNAL MEDICINE

## 2021-04-19 PROCEDURE — 99223 1ST HOSP IP/OBS HIGH 75: CPT | Performed by: INTERNAL MEDICINE

## 2021-04-19 PROCEDURE — 80053 COMPREHEN METABOLIC PANEL: CPT | Performed by: INTERNAL MEDICINE

## 2021-04-19 PROCEDURE — 71045 X-RAY EXAM CHEST 1 VIEW: CPT

## 2021-04-19 PROCEDURE — 250N000009 HC RX 250: Performed by: NURSE PRACTITIONER

## 2021-04-19 PROCEDURE — 999N000157 HC STATISTIC RCP TIME EA 10 MIN

## 2021-04-19 PROCEDURE — 999N001017 HC STATISTIC GLUCOSE BY METER IP

## 2021-04-19 PROCEDURE — 71275 CT ANGIOGRAPHY CHEST: CPT | Mod: ME

## 2021-04-19 RX ORDER — ALBUTEROL SULFATE 0.83 MG/ML
2.5 SOLUTION RESPIRATORY (INHALATION) EVERY 4 HOURS PRN
Status: DISCONTINUED | OUTPATIENT
Start: 2021-04-19 | End: 2021-04-26 | Stop reason: HOSPADM

## 2021-04-19 RX ORDER — IOPAMIDOL 755 MG/ML
66 INJECTION, SOLUTION INTRAVASCULAR ONCE
Status: COMPLETED | OUTPATIENT
Start: 2021-04-19 | End: 2021-04-19

## 2021-04-19 RX ORDER — CARBOXYMETHYLCELLULOSE SODIUM 5 MG/ML
1 SOLUTION/ DROPS OPHTHALMIC
Status: DISCONTINUED | OUTPATIENT
Start: 2021-04-19 | End: 2021-04-25

## 2021-04-19 RX ORDER — LEVETIRACETAM 250 MG/1
250 TABLET ORAL 2 TIMES DAILY
DISCHARGE
Start: 2021-04-19 | End: 2021-04-25

## 2021-04-19 RX ORDER — METOPROLOL TARTRATE 1 MG/ML
2.5 INJECTION, SOLUTION INTRAVENOUS EVERY 6 HOURS
Status: DISCONTINUED | OUTPATIENT
Start: 2021-04-19 | End: 2021-04-26 | Stop reason: HOSPADM

## 2021-04-19 RX ORDER — PREDNISONE 5 MG/1
10 TABLET ORAL 2 TIMES DAILY
Status: DISCONTINUED | OUTPATIENT
Start: 2021-04-19 | End: 2021-04-20

## 2021-04-19 RX ORDER — LORAZEPAM 0.5 MG/1
0.5 TABLET ORAL 2 TIMES DAILY PRN
Qty: 10 TABLET | Refills: 0 | Status: ON HOLD | DISCHARGE
Start: 2021-04-19 | End: 2021-05-15

## 2021-04-19 RX ORDER — METOPROLOL TARTRATE 1 MG/ML
2.5 INJECTION, SOLUTION INTRAVENOUS EVERY 6 HOURS
Status: ON HOLD | DISCHARGE
Start: 2021-04-19 | End: 2021-05-15

## 2021-04-19 RX ORDER — METHYLPREDNISOLONE SODIUM SUCCINATE 40 MG/ML
10 INJECTION, POWDER, LYOPHILIZED, FOR SOLUTION INTRAMUSCULAR; INTRAVENOUS EVERY 12 HOURS
DISCHARGE
Start: 2021-04-19 | End: 2021-04-26

## 2021-04-19 RX ORDER — METHYLPREDNISOLONE SODIUM SUCCINATE 40 MG/ML
10 INJECTION, POWDER, LYOPHILIZED, FOR SOLUTION INTRAMUSCULAR; INTRAVENOUS EVERY 12 HOURS
Status: DISCONTINUED | OUTPATIENT
Start: 2021-04-19 | End: 2021-04-20

## 2021-04-19 RX ORDER — ALBUTEROL SULFATE 0.83 MG/ML
2.5 SOLUTION RESPIRATORY (INHALATION) EVERY 4 HOURS PRN
Status: ON HOLD
Start: 2021-04-19 | End: 2023-11-27

## 2021-04-19 RX ORDER — LEVETIRACETAM 250 MG/1
250 TABLET ORAL 2 TIMES DAILY
Status: ACTIVE | OUTPATIENT
Start: 2021-04-19 | End: 2021-04-26

## 2021-04-19 RX ORDER — LORAZEPAM 2 MG/ML
0.5 INJECTION INTRAMUSCULAR 3 TIMES DAILY PRN
Status: DISCONTINUED | OUTPATIENT
Start: 2021-04-19 | End: 2021-04-26 | Stop reason: HOSPADM

## 2021-04-19 RX ORDER — AMOXICILLIN 250 MG
2 CAPSULE ORAL 2 TIMES DAILY
Status: ON HOLD | DISCHARGE
Start: 2021-04-19 | End: 2021-05-15

## 2021-04-19 RX ORDER — POTASSIUM CHLORIDE 29.8 MG/ML
20 INJECTION INTRAVENOUS
Status: COMPLETED | OUTPATIENT
Start: 2021-04-19 | End: 2021-04-19

## 2021-04-19 RX ORDER — FUROSEMIDE 10 MG/ML
40 INJECTION INTRAMUSCULAR; INTRAVENOUS
DISCHARGE
Start: 2021-04-19 | End: 2021-04-26

## 2021-04-19 RX ADMIN — FUROSEMIDE 40 MG: 10 INJECTION, SOLUTION INTRAMUSCULAR; INTRAVENOUS at 15:53

## 2021-04-19 RX ADMIN — POTASSIUM CHLORIDE 20 MEQ: 29.8 INJECTION, SOLUTION INTRAVENOUS at 11:29

## 2021-04-19 RX ADMIN — SULFAMETHOXAZOLE AND TRIMETHOPRIM 2 TABLET: 800; 160 TABLET ORAL at 09:18

## 2021-04-19 RX ADMIN — IOPAMIDOL 66 ML: 755 INJECTION, SOLUTION INTRAVENOUS at 14:24

## 2021-04-19 RX ADMIN — BUSPIRONE HYDROCHLORIDE 30 MG: 15 TABLET ORAL at 09:17

## 2021-04-19 RX ADMIN — LEVETIRACETAM 250 MG: 100 INJECTION, SOLUTION INTRAVENOUS at 20:40

## 2021-04-19 RX ADMIN — METHYLPREDNISOLONE SODIUM SUCCINATE 10 MG: 40 INJECTION, POWDER, FOR SOLUTION INTRAMUSCULAR; INTRAVENOUS at 18:12

## 2021-04-19 RX ADMIN — SODIUM PHOSPHATE, MONOBASIC, MONOHYDRATE 9 MMOL: 276; 142 INJECTION, SOLUTION INTRAVENOUS at 18:23

## 2021-04-19 RX ADMIN — SENNOSIDES AND DOCUSATE SODIUM 2 TABLET: 8.6; 5 TABLET ORAL at 09:18

## 2021-04-19 RX ADMIN — ALBUTEROL SULFATE 2.5 MG: 2.5 SOLUTION RESPIRATORY (INHALATION) at 10:25

## 2021-04-19 RX ADMIN — PREDNISONE 10 MG: 5 TABLET ORAL at 09:18

## 2021-04-19 RX ADMIN — Medication 12.5 MG: at 09:17

## 2021-04-19 RX ADMIN — LORAZEPAM 0.5 MG: 2 INJECTION INTRAMUSCULAR; INTRAVENOUS at 02:40

## 2021-04-19 RX ADMIN — PANTOPRAZOLE SODIUM 40 MG: 40 INJECTION, POWDER, FOR SOLUTION INTRAVENOUS at 18:21

## 2021-04-19 RX ADMIN — LORAZEPAM 0.5 MG: 2 INJECTION INTRAMUSCULAR; INTRAVENOUS at 09:57

## 2021-04-19 RX ADMIN — SULFAMETHOXAZOLE AND TRIMETHOPRIM 320 MG: 80; 16 INJECTION, SOLUTION, CONCENTRATE INTRAVENOUS at 21:10

## 2021-04-19 RX ADMIN — METOPROLOL TARTRATE 2.5 MG: 1 INJECTION, SOLUTION INTRAVENOUS at 20:39

## 2021-04-19 RX ADMIN — AMLODIPINE BESYLATE 10 MG: 10 TABLET ORAL at 09:17

## 2021-04-19 RX ADMIN — LEVETIRACETAM 500 MG: 500 TABLET, FILM COATED ORAL at 09:17

## 2021-04-19 RX ADMIN — FUROSEMIDE 40 MG: 10 INJECTION, SOLUTION INTRAMUSCULAR; INTRAVENOUS at 09:34

## 2021-04-19 RX ADMIN — POTASSIUM CHLORIDE 20 MEQ: 29.8 INJECTION, SOLUTION INTRAVENOUS at 12:52

## 2021-04-19 RX ADMIN — METOPROLOL TARTRATE 2.5 MG: 1 INJECTION, SOLUTION INTRAVENOUS at 14:42

## 2021-04-19 RX ADMIN — ALBUTEROL SULFATE 2.5 MG: 2.5 SOLUTION RESPIRATORY (INHALATION) at 02:40

## 2021-04-19 RX ADMIN — SODIUM CHLORIDE 90 ML: 9 INJECTION, SOLUTION INTRAVENOUS at 14:24

## 2021-04-19 RX ADMIN — FLUOXETINE 80 MG: 20 CAPSULE ORAL at 09:18

## 2021-04-19 ASSESSMENT — ACTIVITIES OF DAILY LIVING (ADL)
ADLS_ACUITY_SCORE: 18

## 2021-04-19 ASSESSMENT — MIFFLIN-ST. JEOR: SCORE: 1242.13

## 2021-04-19 NOTE — PROGRESS NOTES
[facility]    Neurology Progress Note     Assessment & Plan   Olga Bailey is a 75 year old female who was admitted on 3/26/2021.  Glioblastoma multiforme status post resection, bilateral pneumonia, DVT as well as complication of retroperitoneal bleed.  Acute on chronic anemia.  I have seen the patient for episode of loss of consciousness and shakiness very likely syncope and less likely seizure.  Keppra was started following an abnormal EEG, the patient has been doing well and did not have any seizures she has been better for the last few days however still short of breath.    At this time I will recommend to decrease Keppra to 250 twice a day and discontinue after a week .  However, overall, considering her prior history of glioblastoma as well as craniotomy and removal of the tumor, the patient is at a certain risk of developing seizures.  All of the above were discussed with the patient and patient's daughter.    The patient should follow-up with primary neurologist.    We will sign off for now, if there are any questions or concerns will be please call us.    Claudia Stearns MD    Interval History   No further episodes of syncope, no seizures reported.  The patient is more awake, she feels better however since yesterday she has been more  short of breath.  Continues to have a mild right pronator drift.  She follows commands appropriately and answer questions appropriately.    Physical Exam   Temp: 99.2  F (37.3  C) Temp src: Axillary BP: (!) 161/88 Pulse: 96   Resp: (!) 40 SpO2: 94 % O2 Device: Oxymask Oxygen Delivery: 7 LPM  Vitals:    04/17/21 0650 04/18/21 0600 04/19/21 0600   Weight: 74.2 kg (163 lb 9.3 oz) 74.3 kg (163 lb 12.8 oz) 77.8 kg (171 lb 8.3 oz)     Vital Signs with Ranges  Temp:  [97.8  F (36.6  C)-99.2  F (37.3  C)] 99.2  F (37.3  C)  Pulse:  [73-96] 96  Resp:  [20-40] 40  BP: (119-161)/(67-88) 161/88  SpO2:  [90 %-96 %] 94 %  I/O last 3 completed  shifts:  In: 1732.4 [P.O.:710]  Out: 1900 [Urine:1900]      Medications     dextrose       - MEDICATION INSTRUCTIONS -       [Held by provider] parenteral nutrition - Clinimix E 60 mL/hr at 04/18/21 2231       amLODIPine  10 mg Oral Daily     busPIRone HCl  30 mg Oral BID     FLUoxetine  80 mg Oral Daily     furosemide  40 mg Intravenous BID     insulin aspart  1-12 Units Subcutaneous Q4H     levETIRAcetam  500 mg Oral BID     lipids  250 mL Intravenous Q24H     metoprolol tartrate  12.5 mg Oral BID     pantoprazole  40 mg Oral BID AC     potassium chloride  20 mEq Intravenous Q1H     predniSONE  10 mg Oral Daily     senna-docusate  2 tablet Oral BID    Or     senna-docusate  2 tablet Oral BID     sodium chloride (PF)  3 mL Intracatheter Q8H     sulfamethoxazole-trimethoprim  2 tablet Oral BID       Data   Results for orders placed or performed during the hospital encounter of 03/26/21 (from the past 24 hour(s))   Glucose by meter   Result Value Ref Range    Glucose 146 (H) 70 - 99 mg/dL   Glucose by meter   Result Value Ref Range    Glucose 119 (H) 70 - 99 mg/dL   Glucose by meter   Result Value Ref Range    Glucose 161 (H) 70 - 99 mg/dL   XR Chest Port 1 View    Narrative    EXAM: XR CHEST PORT 1 VW  LOCATION: [facility]  DATE/TIME: 4/19/2021 2:17 AM    INDICATION: Shortness of breath. Hypoxia  COMPARISON: 04/17/2021      Impression    IMPRESSION: Right PICC line tip in cavoatrial region. Shallow inspiration. Stable cardia mediastinal silhouette. Bilateral infiltrates.   Glucose by meter   Result Value Ref Range    Glucose 100 (H) 70 - 99 mg/dL   Comprehensive metabolic panel   Result Value Ref Range    Sodium 132 (L) 133 - 144 mmol/L    Potassium 3.1 (L) 3.4 - 5.3 mmol/L    Chloride 97 94 - 109 mmol/L    Carbon Dioxide 30 20 - 32 mmol/L    Anion Gap 5 3 - 14 mmol/L    Glucose 136 (H) 70 - 99 mg/dL    Urea Nitrogen 24 7 - 30 mg/dL    Creatinine 0.46 (L) 0.52 - 1.04 mg/dL    GFR Estimate >90 >60  mL/min/[1.73_m2]    GFR Estimate If Black >90 >60 mL/min/[1.73_m2]    Calcium 8.1 (L) 8.5 - 10.1 mg/dL    Bilirubin Total 0.3 0.2 - 1.3 mg/dL    Albumin 2.3 (L) 3.4 - 5.0 g/dL    Protein Total 5.5 (L) 6.8 - 8.8 g/dL    Alkaline Phosphatase 76 40 - 150 U/L     (HH) 0 - 50 U/L     (H) 0 - 45 U/L   Magnesium   Result Value Ref Range    Magnesium 2.0 1.6 - 2.3 mg/dL   Phosphorus   Result Value Ref Range    Phosphorus 2.0 (L) 2.5 - 4.5 mg/dL   CBC with platelets   Result Value Ref Range    WBC 8.7 4.0 - 11.0 10e9/L    RBC Count 2.91 (L) 3.8 - 5.2 10e12/L    Hemoglobin 8.5 (L) 11.7 - 15.7 g/dL    Hematocrit 25.3 (L) 35.0 - 47.0 %    MCV 87 78 - 100 fl    MCH 29.2 26.5 - 33.0 pg    MCHC 33.6 31.5 - 36.5 g/dL    RDW 17.3 (H) 10.0 - 15.0 %    Platelet Count 82 (L) 150 - 450 10e9/L   Glucose by meter   Result Value Ref Range    Glucose 141 (H) 70 - 99 mg/dL   Blood gas arterial and oxyhgb   Result Value Ref Range    pH Arterial 7.51 (H) 7.35 - 7.45 pH    pCO2 Arterial 38 35 - 45 mm Hg    pO2 Arterial 65 (L) 80 - 105 mm Hg    Bicarbonate Arterial 30 (H) 21 - 28 mmol/L    FIO2 60     Oxyhemoglobin Arterial 93 92 - 100 %    Base Excess Art 6.3 mmol/L   Glucose by meter   Result Value Ref Range    Glucose 170 (H) 70 - 99 mg/dL   I spent with the patient and patient daughter a total of 15 minutes, all of the time was in coordination of care discussing EEG results, seizures and syncope.

## 2021-04-19 NOTE — PROGRESS NOTES
[facility]  Gastroenterology Progress Note     Olga Bailey MRN# [id number]   YOB: 1945 Age: 75 year old          Assessment and Plan:   Olga Bailey is a 75 year old female who was admitted on 3/26/2021. Who underwent recent craniotomy and resection of glioblastoma multiforme complicated by acute bilateral pneumonia, lower extermitiy DVT started on lovenox w/ complication of retroperitoneal bleed, ileus secondary to retroperitoneal bleed, epistaxis, acute on chronic anemia. GI consulted AST over 1000. Previously normal.      Elevated LFTs  Shocked liver  Has had acute elevated in AST and LAT with normal Tbili, ALP normal following complication of retroperitoneal bleed.  LFTs continue to improve and trend down  CK and LDH normal -unlikely rhabdomyolysis  Abdominal U/S notes no portal or SVC thrombosis  This pattern consistent with direct liver damage due to ischemia- therefore has a shocked liver.    -daily LFTs                Interval History:   Patient on Bipap. Not speaking- due to SOB. Respiratory in room adjusting settings. NO changes since yesterday              Review of Systems:   C: NEGATIVE for fever, chills, change in weight  E/M: NEGATIVE for ear, mouth and throat problems  R: NEGATIVE for significant cough or SOB  CV: NEGATIVE for chest pain, palpitations or peripheral edema             Medications:   I have reviewed this patient's current medications    amLODIPine  10 mg Oral Daily     busPIRone HCl  30 mg Oral BID     FLUoxetine  80 mg Oral Daily     furosemide  40 mg Intravenous BID     insulin aspart  1-12 Units Subcutaneous Q4H     levETIRAcetam  500 mg Oral BID     lipids  250 mL Intravenous Q24H     metoprolol tartrate  12.5 mg Oral BID     pantoprazole  40 mg Oral BID AC     predniSONE  10 mg Oral Daily     senna-docusate  2 tablet Oral BID    Or     senna-docusate  2 tablet Oral BID     sodium chloride (PF)  3 mL Intracatheter Q8H      sulfamethoxazole-trimethoprim  2 tablet Oral BID                  Physical Exam:   Vitals were reviewed  Vital Signs with Ranges  Temp:  [97.8  F (36.6  C)-98.9  F (37.2  C)] 98.9  F (37.2  C)  Pulse:  [73-84] 73  Resp:  [20-31] 21  BP: (119-140)/(67-85) 134/68  SpO2:  [90 %-96 %] 94 %  I/O last 3 completed shifts:  In: 1732.4 [P.O.:710]  Out: 1900 [Urine:1900]  Constitutional: healthy, alert and no distress   Cardiovascular: negative, PMI normal. No lifts, heaves, or thrills. RRR. No murmurs, clicks gallops or rub  Respiratory: Use of accessory muscles. Tachypnis- resp rate 226.  Head: Normocephalic. No masses, lesions, tenderness or abnormalities  Neck: Neck supple. No adenopathy. Thyroid symmetric, normal size,, Carotids without bruits.  Abdomen: Abdomen soft, non-tender. BS normal. No masses, organomegaly           Data:   I reviewed the patient's new clinical lab test results.   Recent Labs   Lab Test 04/19/21  0605 04/18/21  1300 04/18/21  0549 04/17/21  0556 04/17/21  0556 04/16/21  0815 04/16/21  0815 04/05/21  0640 04/05/21  0640 04/02/21  0550 04/02/21  0550   WBC 8.7  --  9.6  --  12.4*  --   --    < > 15.4*   < > 10.9   HGB 8.5* 8.6* 8.1*   < > 7.2*   < >  --    < > 9.1*   < > 9.9*   MCV 87  --  86  --  87  --   --    < > 88   < > 88   PLT 82*  --  69*  --  87*  --   --    < > 214   < > 206   INR  --   --   --   --   --   --  1.18*  --  1.02  --  1.00    < > = values in this interval not displayed.     Recent Labs   Lab Test 04/19/21  0605 04/18/21  0549 04/17/21  0556   POTASSIUM 3.1* 3.4 4.4   CHLORIDE 97 100 105   CO2 30 28 24   BUN 24 25 29   ANIONGAP 5 4 5     Recent Labs   Lab Test 04/19/21  0605 04/18/21  0549 04/17/21  0556 04/14/21 2000 04/14/21 2000 04/02/21  1022 04/02/21  1022 02/17/21  1408 02/17/21  1408   ALBUMIN 2.3* 2.1* 2.0*   < >  --    < >  --    < >  --    BILITOTAL 0.3 0.7 0.2   < >  --    < >  --    < >  --    * 717* 949*   < >  --    < >  --    < >  --    * 188*  351*   < >  --    < >  --    < >  --    PROTEIN  --   --   --   --  10*  --  Negative  --  Negative    < > = values in this interval not displayed.       I reviewed the patient's new imaging results.    All laboratory data reviewed  All imaging studies reviewed by me.    Belen Tripp PA-C,  4/19/2021  [facility]  Office : [phone]  Cell: [phone] (Dr. Hodge)  Cell: [phone] (Belen Tripp PA-C)

## 2021-04-19 NOTE — PROGRESS NOTES
Alteplase initiated to white port of triple lumen picc line. If questions or concerns arise unit RN instructed to call flying reza.

## 2021-04-19 NOTE — PROGRESS NOTES
CLINICAL NUTRITION SERVICES - REASSESSMENT NOTE      RECOMMENDATIONS ORDERED BY REGISTERED DIETITIAN:  Changed supplement to chocolate Magic Shake BID between meals (per previous patient preference)   Malnutrition: 4/5  % Weight Loss:  None noted  % Intake:  </= 50% for >/= 5 days (severe malnutrition)   Subcutaneous Fat Loss: None noted  Muscle Loss: Mild clavicle wasting, mild deltoid wasting, mild dorsal wasting.  Fluid Retention: +1 BLE      Malnutrition Diagnosis: Non Severe Malnutrition in the context of acute on chronic illness.     EVALUATION OF PROGRESS TOWARD GOALS   Diet: Full Liquid Nectar Thick + Chocolate Magic Cup between meals    Nutrition Support: TPN D15 AA5 @ 60 mL/hr + Lipids 250 mL daily = 1522 kcals (27 kcal/kg), 72 gm pro (1.3 gm/kg), 216 gm CHO, 33% fat     Intake/Tolerance:    Wt:77.8 (+7.9 k since admit, +14 kg since lowest recorded wt on )  I/O: 1732/1900, 700 ml PO   Na: 132 (L), K: 3.1 (L), Ma wnl, Phos: 2 (L)  Last BM on   Since 4/15, patient orders two meals a day on average + supplements and food from home.   BGM:  - acceptable    ASSESSED NUTRITION NEEDS:  Dosing Weight: 56.2 kg adj  Energy Needs:  9333-0481 kcals (25-30 Kcal/Kg) - overweight   Protein Needs:  67-84 grams protein (1.2-1.5 g pro/Kg) - hypercatabolism with acute illness     NEW FINDINGS:   -TPN restarted on , reached goal on   - ileus secondary to retroperitoneal bleed  -SLP eval on : Nectar Thick by spoon with assist, Swallow study in the future d/t suspicion of aspiration  -CXR on  =Right PICC line tip in cavoatrial region. Shallow inspiration. Stable cardia mediastinal silhouette. Bilateral infiltrates.  -AXR on =Nonspecific bowel gas pattern with no evidence for obstruction. Moderate amounts of stool in the colon. IVC filter in place. Mild degenerative changes in the spine. Mild patchy infiltrates in the lower lungs. Remainder unremarkable.   -Tried to visit patient who was  being helped by RN for poor respiratory status, needing BiPap - will monitor respiratory status for Nutrition POC.     Previous Goals:   Pt to have diet advanced in the next 48 hrs  Evaluation: Met    Previous Nutrition Diagnosis:   Inadequate protein-energy intake related to current diet order as evidenced by pt now NPO  Evaluation: No longer applicable, pt on Full liquids since 4/18    CURRENT NUTRITION DIAGNOSIS  Inadequate oral intake related to ileus, fatigue, SOB and slow diet advancement as evidenced by patient not eating while on BiPap and requiring TPN to meet estimated nutrition needs.    INTERVENTIONS  Recommendations / Nutrition Prescription  Continue TPN until respiratory status improves and patient can tolerate PO    Implementation  Changed supplement to chocolate Magic Shake BID between meals (per previous patient preference)  Collaboration and Referral of Nutrition care - discussed with PharmD    Goals  TPN to meet % of needs while oral intake inconsistent.     MONITORING AND EVALUATION:  Progress towards goals will be monitored and evaluated per protocol and Practice Guidelines    Klaudia Drew  Dietetic Intern

## 2021-04-19 NOTE — PROGRESS NOTES
[facility]    Medicine Progress Note - Hospitalist Service       Date of Admission:  3/26/2021  Assessment & Plan       75 year old female admitted on 3/26/2021.  Past medical history that is most notable for recent craniotomy and resection of glioblastoma multiforme, as well as uncomplicated asthma, who presents with dyspnea and is found to have acute bilateral pneumonia.     Acute hypoxic respiratory failure due to bilateral pneumonia, suspected PCP  - discussed with ID, all consistent with PCP  - Bactrim DS 2 tid would've been completed today 4/19 (21 of 21) however given increased O2 needs will continue BID bactrim  - pred was to be switched to daily 4/19, but given worsening respiratory failure today, will continue BID dosing  - continue bipap as needed  - check CT PE - cannot anticoagulate however given recent bleed  - Echocardiogram ordered  - Pulmonology reconsulted      Retroperitoneal bleed 2nd to Lovenox 4/14/2021 AM  - Hgb stable today (hgb 8.5 <-- 8.1 <-- 8.6)                Increased SOB, suspect mild fluid overload -- increased wt 2nd to blood and IV fluids and TPN  - CXR 4/18 with no new infiltrates  - IV lasix bid, reduced dose as weight comes down     Shock Liver with Elevated LFT's -- related to Retroperitoneal Bleed, resolving     Thrombocytopenia -- suspect consumption from bleeding, continues to drop   - follow CBC     Leukocytosis --suspect stress related, now resolved                Retroperitoneal bleed 2nd to Lovenox 4/14/2021 AM  - Abd Pelvis CTA  without evidence of ongoing bleed                Ileus 2nd to bleed -- persistent, probable 2nd to retroperitoneal bleed  - restarted 4/18 TPN until taking adequate PO, but held as below     Bilateral Lower Leg DVT on 3/29, hypercoagulable 2nd to GBM  - off Lovenox (last dose 4/14 AM) -- IR placed IVC filter 4/16  - consider Lovenox 40 mg subcutaneous in several days if hgb stable      Epistaxis  - now stable, off  lovenox     Hyponatremia  - mild, 2nd to SIADH from stress and pulm process  - stable     Steroid induced hyperglycemia -- resolved  - restart insulin as need     Urinary incontinence -- has pure wick     Hypokalemia, variable - replacement protocol     GBM s/p resection 2/23/21  Followed by Dr. Baker of Neuro-Oncology.  She is in process of being set up to start chemotherapy and XRT possibly this Week  - has not yet initiated chemo (temozolomide) and planned radiation  - Family agrees on 2nd opinion with [facility] concerning GBM treatment, [place] requested Dr. Stephanie Baker consult [facility] [facility] to initiate process (the Patient's Ex-,  Renaldo Dow, is eager to hear what they recommend)     Hypertension  - continue PTA amlodipine when taking PO (on bipap)     Sinus tachycardia -- clinically improved  - metoprolol 12.5 mg bid po or IV 2.5 q 6 given npo     Asthma  MARCELLE  - nebs prn  - resume CPAP with home setting 4/9     Depression and anxiety  Insomnia  - continue PTA Prozac and Buspar  - continue PTA Atarax for sleep  - prn lorazepam     GERD  - switch to PPI as above     Chronic anemia of chronic disease, with Acute blood loss  Baseline hgb 10-11 g/dL, stable around 8     Non-severe malnutrition  Noted decrease oral intake and drop in albumin, subcutaneous fat loss on exam.   - PICC line in place  - excellent oral intake, tapered off TPN 4/9, but restarted 4/18 as above      Diet: Room Service  Advance Diet as Tolerated  parenteral nutrition - Clinimix E  Combination Diet Full Liquid Diet; Nectar Thickened Liquids (pre-thickened or use instant food thickener) (spoon or cup)  Snacks/Supplements Adult: Magic Cup; Between Meals    DVT Prophylaxis: IVC filter  Martino Catheter: not present  Code Status: Full Code           Disposition Plan   Expected discharge: family looking for transfer to [facility] - possibly later this evening to [facility]  Entered: Duke Bernabe MD 04/19/2021, 9:52 AM       The patient's care  was discussed with the Bedside Nurse, Patient, Patient's Family and Oncology Consultant.    Duke Bernabe MD  Hospitalist Service  [facility]  Contact information available via [facility] Paging/Directory    ______________________________________________________________________    Interval History   Increased O2 need and tachypnea mid morning, CT PE, echo ordered. Bipap as needed. Patient sedated after some lorazepam. Daughter at bedside. Discussed with Oncology and also patient's ex  on the phone at family request.     Ultimately, family wishes to transfer to outside hospital, will facilitate this however possible - as many of the area hospitals are full.     Data reviewed today: I reviewed all medications, new labs and imaging results over the last 24 hours. I personally reviewed no images or EKG's today.    Physical Exam   Vital Signs: Temp: 98.9  F (37.2  C) Temp src: Axillary BP: 134/68 Pulse: 73   Resp: 21 SpO2: 95 % O2 Device: Oxymask Oxygen Delivery: 5 LPM  Weight: 171 lbs 8.29 oz    Gen: NAD, somnolent  HEENT: Normocephalic, EOMI, MMM  Resp: no focal crackles,  no wheezes, on bipap  CV: S1S2 heard, reg rhythm, reg rate  Abdo: soft, nontender, nondistended, bowel sounds present  Ext: calves nontender, well perfused  Neuro: somnolent, awakens to questions,       Data   Recent Labs   Lab 04/19/21  0605 04/18/21  1300 04/18/21  0549 04/17/21  0556 04/17/21  0556 04/16/21  0815 04/16/21  0815 04/14/21  2150 04/14/21  2150 04/14/21  1627   WBC 8.7  --  9.6  --  12.4*  --   --    < >  --  16.1*   HGB 8.5* 8.6* 8.1*   < > 7.2*   < >  --    < > 7.2* 7.3*   MCV 87  --  86  --  87  --   --    < >  --  90   PLT 82*  --  69*  --  87*  --   --    < >  --  187   INR  --   --   --   --   --   --  1.18*  --   --   --    *  --  132*  --  134  --   --    < >  --  130*   POTASSIUM 3.1*  --  3.4  --  4.4  --   --    < >  --  5.3   CHLORIDE 97  --  100  --  105  --   --    < >  --  98   CO2  30  --  28  --  24  --   --    < >  --  25   BUN 24  --  25  --  29  --   --    < >  --  42*   CR 0.46*  --  0.53  --  0.54  --   --    < >  --  0.79   ANIONGAP 5  --  4  --  5  --   --    < >  --  7   ESTIVEN 8.1*  --  8.0*  --  8.2*  --   --    < >  --  8.8   *  --  94  --  79  --   --    < >  --  240*   ALBUMIN 2.3*  --  2.1*  --  2.0*  --   --    < >  --  2.6*   PROTTOTAL 5.5*  --  5.1*  --  4.9*  --   --    < >  --  5.4*   BILITOTAL 0.3  --  0.7  --  0.2  --   --    < >  --  0.2   ALKPHOS 76  --  69  --  63  --   --   --   --  88   *  --  717*  --  949*  --   --    < >  --  61*   *  --  188*  --  351*  --   --    < >  --  22   TROPI  --   --   --   --   --   --   --   --  0.021 <0.015    < > = values in this interval not displayed.     Recent Results (from the past 24 hour(s))   XR Chest Port 1 View    Narrative    EXAM: XR CHEST PORT 1 VW  LOCATION: [facility]  DATE/TIME: 4/19/2021 2:17 AM    INDICATION: Shortness of breath. Hypoxia  COMPARISON: 04/17/2021      Impression    IMPRESSION: Right PICC line tip in cavoatrial region. Shallow inspiration. Stable cardia mediastinal silhouette. Bilateral infiltrates.

## 2021-04-19 NOTE — PROGRESS NOTES
[facility]    Infectious Disease Progress Note    Date of Service (when I saw the patient): 04/19/2021     Assessment & Plan   Olga Bailey is a 75 year old female who was admitted on 3/26/2021.     Impression:  1. 75 y.o with recent diagnosis of craniotomy and resection of glioblastoma multiforme.   2. Has been on very high dose steroid taper for the past month PTA  since the craniotomy. Not on any bactrim prophylaxis before admission.    3. Admitted with shortness of breath.   4. Found to have bilateral ground glass infiltrates on the imaging, very hypoxic. Concern for PJP. Never able to give sputum for sample. COVID PCR negative x 3.   5. Lactic acid returns to normal with improvement in LFT which makes ischemia likely the source     Recommendations:   CT abdomen with new retroperitoneal bleed, was started on zosyn for leucocytosis and lactic acidosis, procal negative, which could be because of the bleed and the steroids now off, leucocytosis is improved.   Last 24- 48 more work of breathing initial suspicion was volume overload. Another possibility is due to steroid taper can go slower than what we are doing will defer to IM and pharmacy though with 3 weeks of PJP treatment this should not be the cause.     On treatment with Bactrim for High suspicion for PJP pneumonia. LDH high, high beta d glucan unable to confirm with the sputum studies as patient has been unable to provide any sputum   Steroids for PJP continue to taper to be off steroids in next few days. Discussed with Im who also ran the course by neuro-onc.  Day 21/21 septra was improving very well before this past 24- 36 hours with new bleed and again increase work of breathing.     Consider repeating a CT chest, consider slowing the steroid taper, for now also continue on bactrim frequency is now reduced at BID. Consider reval with pulm               Per neuro onc last note her cancer related plan was as listed in the  "note below, I would recommend follow up with Neuro onc after discharge.    I am copying and pasting Neuro onc last note below:   \" PLAN  -CANCER-DIRECTED THERAPY-  Will initiate chemoradiotherapy with temozolomide 75mg/m2 (140mg).   -Instructed to start temozolomide the evening before the start date of radiation and continue daily until the completion of radiation.   -Take temozolomide on an empty stomach, 30 minutes after Zofran dosing.  -Additional temozolomide teaching performed by RN/ pharmacy staff.      -Initial labs ordered; CBC with differential, CMP, Hepatitis B serologies; NR.  -Will continue weekly CBC and repeat CMP at follow-up.     -Medications prescribed;        -Zofran 4mg (1 to 2 tabs qHS 30 minutes prior to chemotherapy and then PRN nausea).       -For lymphopenia, prophylactic antibiotics are recommended during concurrent treatment. Will start Sulfamethoxazole-trimethoprim (Bactrim) 800 mg-160 mg; 1 tab orally Monday, Wednesday, and Friday for pneumocystis prophylaxis. If thrombocytopenia becomes an issue, can change to Dapsone, Atovaquone, or Pentamidine.        -Docusate 100 mg; 1 cap orally 3 times a day (stool softener for constipation)       -Senna 8.6 m tabs orally at bedtime (laxative as needed for constipation)     -STEROIDS-  -Continue to wean dexamethasone as tolerated;                           4mg daily (today - 3/28)                          2mg daily (3/29 - )                          2mg on , , , then stop.   -Dose may increase while undergoing radiation.\"          Robyn Branham MD    Interval History    Continues to be hypoxic   No fever   Creat stable   WBC normalized     RRT 4 days ago : Spoke with Jailyn Hubbard who was called on rapid response -- patient was up to the bathroom after enema, bowels moved twice per nurse, then started jerking and less responsive, but nurse was still able to communicate with her, but more lethargic    Physical Exam   Temp: 98.9  F " (37.2  C) Temp src: Axillary BP: 134/68 Pulse: 73   Resp: 21 SpO2: 95 % O2 Device: Oxymask Oxygen Delivery: 5 LPM  Vitals:    04/17/21 0650 04/18/21 0600 04/19/21 0600   Weight: 74.2 kg (163 lb 9.3 oz) 74.3 kg (163 lb 12.8 oz) 77.8 kg (171 lb 8.3 oz)     Vital Signs with Ranges  Temp:  [97.8  F (36.6  C)-98.9  F (37.2  C)] 98.9  F (37.2  C)  Pulse:  [73-84] 73  Resp:  [20-31] 21  BP: (119-140)/(67-85) 134/68  SpO2:  [90 %-96 %] 95 %    Constitutional: Awake, alert  Lungs: diminished bilateral   Cardiovascular: Regular rate and rhythm, normal S1 and S2, and no murmur noted  Abdomen: Normal bowel sounds, soft, non-distended, non-tender  Skin: No rashes, no cyanosis, no edema  Other:    Medications     dextrose       parenteral nutrition - Clinimix E 60 mL/hr at 04/18/21 2231       amLODIPine  10 mg Oral Daily     busPIRone HCl  30 mg Oral BID     FLUoxetine  80 mg Oral Daily     furosemide  40 mg Intravenous BID     insulin aspart  1-12 Units Subcutaneous Q4H     levETIRAcetam  500 mg Oral BID     lipids  250 mL Intravenous Q24H     metoprolol tartrate  12.5 mg Oral BID     pantoprazole  40 mg Oral BID AC     predniSONE  10 mg Oral Daily     senna-docusate  2 tablet Oral BID    Or     senna-docusate  2 tablet Oral BID     sodium chloride (PF)  3 mL Intracatheter Q8H     sulfamethoxazole-trimethoprim  2 tablet Oral BID       Data   All microbiology laboratory data reviewed.  Recent Labs   Lab Test 04/19/21  0605 04/18/21  1300 04/18/21  0549 04/17/21  0556 04/17/21  0556   WBC 8.7  --  9.6  --  12.4*   HGB 8.5* 8.6* 8.1*   < > 7.2*   HCT 25.3*  --  24.3*  --  21.8*   MCV 87  --  86  --  87   PLT 82*  --  69*  --  87*    < > = values in this interval not displayed.     Recent Labs   Lab Test 04/19/21  0605 04/18/21  0549 04/17/21  0556   CR 0.46* 0.53 0.54     No lab results found.  Recent Labs   Lab Test 03/29/21 1951 03/29/21 1946   CULT No growth No growth       Attestation:  Total time on the floor involved in the  patient's care: 35 minutes. Total time spent in counseling/care coordination: >50%

## 2021-04-19 NOTE — PLAN OF CARE
"    SLP - Pt briefly observed in room this am to determine ability to participate in a VFSS this pm.  Work of breathing noted with RR 38-40+ and when asked if she felt ok, pt nodded her head \"no\".  RN/Charge RN notified and assessing pt.  Will check status later today.    ADDENDUM: Given Bipap needs, plan to cancel VFSS/Tx session today.  Will follow.  "

## 2021-04-19 NOTE — PLAN OF CARE
Vital signs stable on 5L nasal cannula. Alert and oriented except to time. Lung sounds diminished. Bowel sounds active with moderate distention, flatus negative. Up with assist of 2.Tolerating nectar thick with pills. CMS/neuros intact. Voiding to purewick. Tele normal sinus. Turn and repo q2h. TPN and lipids continued.

## 2021-04-19 NOTE — PROGRESS NOTES
Pt had episode of tachypenia during speech therapy. See resp therapist note. Pt requested to be placed on BIPAP. Charge nurse notified RT who assessed pt and placed her on BIPAP. Pt A&O x3. VSS at times tachypenic on BIPAP. Tele SR. CMS intact. Lungs diminished. BS +, BM-, flatus+. Incisions . NPO diet. - N/V. Pt on IV lasix is voiding adequately. Denied having pain. Assist of 2 repositioning.     Plan is for pt to transfer to [facility] later tonight when bed becomes available. Daughter aware of plan and is at pt's bedside.

## 2021-04-19 NOTE — PROVIDER NOTIFICATION
MD Notification    Notified Person: MD    Notified Person Name: Dr. Bernabe    Notification Date/Time: 1018    Notification Interaction: page sent    Purpose of Notification: pt requesting BiPAP    Orders Received: called back received, MD putting order in.    Comments:RT was called to assess patient, recommend either HFNC or BiPAP, no current order for BiPAP.

## 2021-04-19 NOTE — CONSULTS
Consult Date:  04/19/2021      HEMATOLOGY/ONCOLOGY CONSULTATION      This consult has been requested by Dr. Bonilla Rosa for GBM.      Ms. Bailey is a 75-year-old female with left frontoparietal glioblastoma (IDH wild type, MGMT methylated).  The patient had gross total resection on 02/23/2021.  The patient has been seen by Dr. Baker.  Concurrent chemoradiation with temozolomide has been recommended.  Treatment has not yet been started.      The patient has multiple other ongoing problems.  Lower extremity ultrasound on 03/29/2021 revealed bilateral lower extremity DVT.  The patient was started on anticoagulation with heparin, which was transitioned to Lovenox.  The patient's hemoglobin decreased.  CT abdomen and pelvis on 04/14/2021 revealed large right retroperitoneal hematoma.  Anticoagulation was stopped.  IVC filter was placed.  Vascular Surgery is following.  Retroperitoneal hematoma is now stable.  No intervention is planned.      The patient also has pneumonia. She presented to ER with shortness of breath. CT chest angiogram on 03/26/2021 did not reveal any pulmonary embolism.  There was patchy ground-glass opacity throughout both lungs. COVID negative. The patient is being treated for PJP pneumonia on clinical grounds.  ID is following.      The patient's overall condition has not improved.  Her shortness of breath has worsened.  She is on BiPAP. Daughter was at the bedside.  She is very concerned regarding possibility of pulmonary embolism.  She would like that to be ruled out.  We will get a CT chest angiogram.      As per the daughter, patient's condition has deteriorated since yesterday.  The patient was more awake and alert yesterady.  Today, she is more lethargic.  Also, she is more short of breath. Daughter mentioned that she had a talk with her mother.  Mother wants to be full code and wants everything to be done.  There has been talk of possible transfer [facility].      The patient's  labs have been monitored.  Hemoglobin is remaining stable around 8.5.  I spoke to the nurse.  No bleeding from any site.      Vital signs reviewed.  The patient is not having any fever.      ALLERGIES:  REVIEWED.      MEDICATIONS:  Reviewed.      PAST MEDICAL HISTORY:   1.  GBM as described above.   2.  Skin cancer.   3.  Depression.   4.  Asthma.   5.  Hysterectomy.   6.  Back surgery.   7.  Bunion surgery.   8.  Rotator cuff surgery.      PHYSICAL EXAMINATION:   GENERAL:  The patient is on BiPAP.  She is very weak.   VITAL SIGNS:  Reviewed.   Rest of the systems not examined.      LABORATORY DATA:  Reviewed.      ASSESSMENT:   1.  A 75-year-old female with GBM status post gross total resection.  Radiation and temozolomide have not yet been started.   2.  Bilateral lower extremity deep venous thrombosis.  Anticoagulation is on hold due to bleeding.  Status post IVC filter.   3.  Retroperitoneal hematoma secondary to anticoagulation.   4.  Bilateral extensive pulmonary infiltrate from pneumonia, likely PJP pneumonia.   5.  Anemia secondary to retroperitoneal bleed.   6.  Thrombocytopenia.   7.  Shock liver with elevated LFT.      RECOMMENDATIONS:   1.  I had discussion with the daughter who was at the bedside.    Discussed regarding GBM. The patient had gross total resection.  She needs radiation with temozolomide.  That will be started when the patient recovers from her current illness.  Family is thinking of a second opinion at the [facility].      Discussed regarding bilateral lower extremity DVT.  The patient has an IVC filter.  Anticoagulation is on hold because of retroperitoneal bleed.  We will consider restarting anticoagulation in the next few weeks once her overall condition is stable and there was no bleeding and hemoglobin remains stable. The patient will need lifelong anticoagulation.      Discussed regarding anemia.  This is mainly from retroperitoneal bleed.  Some of it is also from  multiple medical problems and medications.  We will check some labs including iron studies in the morning.     Patient's shortness of breath has gotten worse. Will get CT chest angiogram.     The patient has bilateral pneumonia.  Treatment as per ID.     2. I explained to the daughter that the patient's overall condition is not good. Patient will be continued on aggressive treatment. Case discussed with Dr. Duke Bernabe.      Thanks for the consult.      Total time spent 80 minutes.      ADDENDUM:  CT chest angiogram did not reveal any pulmonary embolism.  There is extensive bilateral pulmonary infiltrate.         CODEY HDZ MD             D: 2021   T: 2021   MT: CARLOS      Name:     RADHA RYAN   MRN:      [id number]        Account:       [id number]   :      1945           Consult Date:  2021      Document: [id number]

## 2021-04-20 ENCOUNTER — PATIENT OUTREACH (OUTPATIENT)
Dept: ONCOLOGY | Facility: CLINIC | Age: 76
End: 2021-04-20

## 2021-04-20 ENCOUNTER — APPOINTMENT (OUTPATIENT)
Dept: OCCUPATIONAL THERAPY | Facility: CLINIC | Age: 76
DRG: 166 | End: 2021-04-20
Attending: HOSPITALIST
Payer: MEDICARE

## 2021-04-20 ENCOUNTER — APPOINTMENT (OUTPATIENT)
Dept: CARDIOLOGY | Facility: CLINIC | Age: 76
DRG: 166 | End: 2021-04-20
Attending: HOSPITALIST
Payer: MEDICARE

## 2021-04-20 LAB
ALBUMIN SERPL-MCNC: 2.3 G/DL (ref 3.4–5)
ALP SERPL-CCNC: 83 U/L (ref 40–150)
ALT SERPL W P-5'-P-CCNC: 411 U/L (ref 0–50)
ANION GAP SERPL CALCULATED.3IONS-SCNC: 5 MMOL/L (ref 3–14)
AST SERPL W P-5'-P-CCNC: 68 U/L (ref 0–45)
BILIRUB SERPL-MCNC: 0.5 MG/DL (ref 0.2–1.3)
BUN SERPL-MCNC: 21 MG/DL (ref 7–30)
CALCIUM SERPL-MCNC: 8.7 MG/DL (ref 8.5–10.1)
CHLORIDE SERPL-SCNC: 96 MMOL/L (ref 94–109)
CO2 SERPL-SCNC: 30 MMOL/L (ref 20–32)
CREAT SERPL-MCNC: 0.4 MG/DL (ref 0.52–1.04)
ERYTHROCYTE [DISTWIDTH] IN BLOOD BY AUTOMATED COUNT: 17.1 % (ref 10–15)
FERRITIN SERPL-MCNC: 362 NG/ML (ref 8–252)
FOLATE SERPL-MCNC: 11.7 NG/ML
GFR SERPL CREATININE-BSD FRML MDRD: >90 ML/MIN/{1.73_M2}
GLUCOSE BLDC GLUCOMTR-MCNC: 143 MG/DL (ref 70–99)
GLUCOSE BLDC GLUCOMTR-MCNC: 145 MG/DL (ref 70–99)
GLUCOSE BLDC GLUCOMTR-MCNC: 157 MG/DL (ref 70–99)
GLUCOSE BLDC GLUCOMTR-MCNC: 219 MG/DL (ref 70–99)
GLUCOSE BLDC GLUCOMTR-MCNC: 88 MG/DL (ref 70–99)
GLUCOSE SERPL-MCNC: 95 MG/DL (ref 70–99)
HCT VFR BLD AUTO: 26.7 % (ref 35–47)
HGB BLD-MCNC: 8.8 G/DL (ref 11.7–15.7)
IRON SATN MFR SERPL: 7 % (ref 15–46)
IRON SERPL-MCNC: 23 UG/DL (ref 35–180)
MAGNESIUM SERPL-MCNC: 1.9 MG/DL (ref 1.6–2.3)
MCH RBC QN AUTO: 28.3 PG (ref 26.5–33)
MCHC RBC AUTO-ENTMCNC: 33 G/DL (ref 31.5–36.5)
MCV RBC AUTO: 86 FL (ref 78–100)
PHOSPHATE SERPL-MCNC: 2 MG/DL (ref 2.5–4.5)
PLATELET # BLD AUTO: 102 10E9/L (ref 150–450)
POTASSIUM SERPL-SCNC: 3.5 MMOL/L (ref 3.4–5.3)
POTASSIUM SERPL-SCNC: 3.7 MMOL/L (ref 3.4–5.3)
PROT SERPL-MCNC: 5.6 G/DL (ref 6.8–8.8)
RBC # BLD AUTO: 3.11 10E12/L (ref 3.8–5.2)
SODIUM SERPL-SCNC: 131 MMOL/L (ref 133–144)
TIBC SERPL-MCNC: 351 UG/DL (ref 240–430)
VIT B12 SERPL-MCNC: 864 PG/ML (ref 193–986)
WBC # BLD AUTO: 9.9 10E9/L (ref 4–11)

## 2021-04-20 PROCEDURE — 999N000190 HC STATISTIC VAT ROUNDS

## 2021-04-20 PROCEDURE — C9113 INJ PANTOPRAZOLE SODIUM, VIA: HCPCS | Performed by: HOSPITALIST

## 2021-04-20 PROCEDURE — 93321 DOPPLER ECHO F-UP/LMTD STD: CPT

## 2021-04-20 PROCEDURE — 93308 TTE F-UP OR LMTD: CPT | Mod: 26 | Performed by: INTERNAL MEDICINE

## 2021-04-20 PROCEDURE — 94660 CPAP INITIATION&MGMT: CPT

## 2021-04-20 PROCEDURE — 83550 IRON BINDING TEST: CPT | Performed by: HOSPITALIST

## 2021-04-20 PROCEDURE — 250N000011 HC RX IP 250 OP 636: Performed by: HOSPITALIST

## 2021-04-20 PROCEDURE — 250N000011 HC RX IP 250 OP 636: Performed by: NURSE PRACTITIONER

## 2021-04-20 PROCEDURE — 999N000157 HC STATISTIC RCP TIME EA 10 MIN

## 2021-04-20 PROCEDURE — 82728 ASSAY OF FERRITIN: CPT | Performed by: HOSPITALIST

## 2021-04-20 PROCEDURE — 258N000003 HC RX IP 258 OP 636: Performed by: HOSPITALIST

## 2021-04-20 PROCEDURE — 82746 ASSAY OF FOLIC ACID SERUM: CPT | Performed by: HOSPITALIST

## 2021-04-20 PROCEDURE — 80053 COMPREHEN METABOLIC PANEL: CPT | Performed by: HOSPITALIST

## 2021-04-20 PROCEDURE — 250N000009 HC RX 250: Performed by: HOSPITALIST

## 2021-04-20 PROCEDURE — 250N000011 HC RX IP 250 OP 636: Performed by: INTERNAL MEDICINE

## 2021-04-20 PROCEDURE — 120N000013 HC R&B IMCU

## 2021-04-20 PROCEDURE — 93325 DOPPLER ECHO COLOR FLOW MAPG: CPT | Mod: 26 | Performed by: INTERNAL MEDICINE

## 2021-04-20 PROCEDURE — 258N000003 HC RX IP 258 OP 636: Performed by: INTERNAL MEDICINE

## 2021-04-20 PROCEDURE — 84100 ASSAY OF PHOSPHORUS: CPT | Performed by: HOSPITALIST

## 2021-04-20 PROCEDURE — 83735 ASSAY OF MAGNESIUM: CPT | Performed by: HOSPITALIST

## 2021-04-20 PROCEDURE — 255N000002 HC RX 255 OP 636: Performed by: HOSPITALIST

## 2021-04-20 PROCEDURE — 999N001017 HC STATISTIC GLUCOSE BY METER IP

## 2021-04-20 PROCEDURE — 97110 THERAPEUTIC EXERCISES: CPT | Mod: GO | Performed by: OCCUPATIONAL THERAPIST

## 2021-04-20 PROCEDURE — 99233 SBSQ HOSP IP/OBS HIGH 50: CPT | Performed by: HOSPITALIST

## 2021-04-20 PROCEDURE — 82607 VITAMIN B-12: CPT | Performed by: HOSPITALIST

## 2021-04-20 PROCEDURE — 83540 ASSAY OF IRON: CPT | Performed by: HOSPITALIST

## 2021-04-20 PROCEDURE — 99232 SBSQ HOSP IP/OBS MODERATE 35: CPT | Performed by: INTERNAL MEDICINE

## 2021-04-20 PROCEDURE — 85027 COMPLETE CBC AUTOMATED: CPT | Performed by: HOSPITALIST

## 2021-04-20 PROCEDURE — 93321 DOPPLER ECHO F-UP/LMTD STD: CPT | Mod: 26 | Performed by: INTERNAL MEDICINE

## 2021-04-20 RX ORDER — METHYLPREDNISOLONE SODIUM SUCCINATE 125 MG/2ML
60 INJECTION, POWDER, LYOPHILIZED, FOR SOLUTION INTRAMUSCULAR; INTRAVENOUS EVERY 8 HOURS
Status: DISCONTINUED | OUTPATIENT
Start: 2021-04-20 | End: 2021-04-23

## 2021-04-20 RX ADMIN — ASCORBIC ACID, VITAMIN A PALMITATE, CHOLECALCIFEROL, THIAMINE HYDROCHLORIDE, RIBOFLAVIN-5 PHOSPHATE SODIUM, PYRIDOXINE HYDROCHLORIDE, NIACINAMIDE, DEXPANTHENOL, ALPHA-TOCOPHEROL ACETATE, VITAMIN K1, FOLIC ACID, BIOTIN, CYANOCOBALAMIN: 200; 3300; 200; 6; 3.6; 6; 40; 15; 10; 150; 600; 60; 5 INJECTION, SOLUTION INTRAVENOUS at 20:45

## 2021-04-20 RX ADMIN — METHYLPREDNISOLONE SODIUM SUCCINATE 10 MG: 40 INJECTION, POWDER, FOR SOLUTION INTRAMUSCULAR; INTRAVENOUS at 06:04

## 2021-04-20 RX ADMIN — SULFAMETHOXAZOLE AND TRIMETHOPRIM 320 MG: 80; 16 INJECTION, SOLUTION, CONCENTRATE INTRAVENOUS at 08:20

## 2021-04-20 RX ADMIN — METHYLPREDNISOLONE SODIUM SUCCINATE 62.5 MG: 125 INJECTION, POWDER, FOR SOLUTION INTRAMUSCULAR; INTRAVENOUS at 18:46

## 2021-04-20 RX ADMIN — PANTOPRAZOLE SODIUM 40 MG: 40 INJECTION, POWDER, FOR SOLUTION INTRAVENOUS at 16:49

## 2021-04-20 RX ADMIN — LEVETIRACETAM 250 MG: 100 INJECTION, SOLUTION INTRAVENOUS at 10:06

## 2021-04-20 RX ADMIN — PANTOPRAZOLE SODIUM 40 MG: 40 INJECTION, POWDER, FOR SOLUTION INTRAVENOUS at 08:20

## 2021-04-20 RX ADMIN — IRON SUCROSE 300 MG: 20 INJECTION, SOLUTION INTRAVENOUS at 16:49

## 2021-04-20 RX ADMIN — HYDROMORPHONE HYDROCHLORIDE 0.2 MG: 0.2 INJECTION, SOLUTION INTRAMUSCULAR; INTRAVENOUS; SUBCUTANEOUS at 07:00

## 2021-04-20 RX ADMIN — HYDROMORPHONE HYDROCHLORIDE 0.2 MG: 0.2 INJECTION, SOLUTION INTRAMUSCULAR; INTRAVENOUS; SUBCUTANEOUS at 14:41

## 2021-04-20 RX ADMIN — FUROSEMIDE 40 MG: 10 INJECTION, SOLUTION INTRAMUSCULAR; INTRAVENOUS at 08:20

## 2021-04-20 RX ADMIN — SULFAMETHOXAZOLE AND TRIMETHOPRIM 320 MG: 80; 16 INJECTION, SOLUTION, CONCENTRATE INTRAVENOUS at 21:13

## 2021-04-20 RX ADMIN — METHYLPREDNISOLONE SODIUM SUCCINATE 62.5 MG: 125 INJECTION, POWDER, FOR SOLUTION INTRAMUSCULAR; INTRAVENOUS at 11:48

## 2021-04-20 RX ADMIN — METOPROLOL TARTRATE 2.5 MG: 1 INJECTION, SOLUTION INTRAVENOUS at 14:33

## 2021-04-20 RX ADMIN — METOPROLOL TARTRATE 2.5 MG: 1 INJECTION, SOLUTION INTRAVENOUS at 08:19

## 2021-04-20 RX ADMIN — HUMAN ALBUMIN MICROSPHERES AND PERFLUTREN 9 ML: 10; .22 INJECTION, SOLUTION INTRAVENOUS at 10:18

## 2021-04-20 RX ADMIN — SODIUM PHOSPHATE, MONOBASIC, MONOHYDRATE 9 MMOL: 276; 142 INJECTION, SOLUTION INTRAVENOUS at 10:06

## 2021-04-20 RX ADMIN — METOPROLOL TARTRATE 2.5 MG: 1 INJECTION, SOLUTION INTRAVENOUS at 20:47

## 2021-04-20 RX ADMIN — LORAZEPAM 0.5 MG: 2 INJECTION INTRAMUSCULAR; INTRAVENOUS at 22:42

## 2021-04-20 RX ADMIN — LEVETIRACETAM 250 MG: 100 INJECTION, SOLUTION INTRAVENOUS at 20:53

## 2021-04-20 RX ADMIN — FUROSEMIDE 40 MG: 10 INJECTION, SOLUTION INTRAMUSCULAR; INTRAVENOUS at 16:49

## 2021-04-20 RX ADMIN — METOPROLOL TARTRATE 2.5 MG: 1 INJECTION, SOLUTION INTRAVENOUS at 03:37

## 2021-04-20 ASSESSMENT — ACTIVITIES OF DAILY LIVING (ADL)
ADLS_ACUITY_SCORE: 19

## 2021-04-20 ASSESSMENT — MIFFLIN-ST. JEOR: SCORE: 1199.13

## 2021-04-20 NOTE — PLAN OF CARE
VSS on BiPAP. Tele: NSR. A/Ox 3. Labs: Potassium replaced. R. Groin incision bruised. CMS intact. Dilaudid 0.2mg available for pain. Up with 2. NPO. Denied N&V. +gas, -bm. Purewick in place. LS dim. Will continue to monitor.

## 2021-04-20 NOTE — PROGRESS NOTES
"PULMONOLOGY PROGRESS NOTE    Date of Admission: 3/26/2021    CC/Reason for Hospital visit: Acute hypoxic respiratory failure, suspected PJP  SUBJECTIVE      Events reviewed since last seen. Patient had tapered off of BiPAP to high flow nasal cannula.  She was anticoagulated for bilateral lower extremity DVT on 3/29.  Patient was last seen by Dr. Ortiz on 4/12, who then signed off.  Interim hospital course notable for retroperitoneal bleed 4/14, increased shortness of breath due to volume overload, and shock liver.  Reconsulted for worsening shortness of breath, hypoxemia and pulmonary filtrates, now requiring BiPAP.    ROS: A Problem Pertinent review of systems was negative except for items noted in HPI.  Past Medical, Family, and Social/Substance History has been reviewed: No interval changes.    OBJECTIVE   Vital signs:  Temp: 98.8  F (37.1  C) Temp src: Axillary BP: 137/80 Pulse: 81   Resp: 29 SpO2: 92 % O2 Device: BiPAP/CPAP Oxygen Delivery: 7 LPM Height: 160 cm (5' 3\") Weight: 73.5 kg (162 lb 0.6 oz)  Estimated body mass index is 28.7 kg/m  as calculated from the following:    Height as of this encounter: 1.6 m (5' 3\").    Weight as of this encounter: 73.5 kg (162 lb 0.6 oz).        I/O last 3 completed shifts:  In: 300 [P.O.:300]  Out: 3300 [Urine:3300]      CONSTITUTIONAL/GENERAL APPEARANCE: Aler female. No Apparent Distress.  PSYCHIATRIC: Pleasant and appropriate mood and affect. Oriented x 3.  EARS, NOSE,THROAT,MOUTH: External ears and nose overall normal. Normal oral mucosa.   NECK: Neck appearance normal. No neck masses and the thyroid is not enlarged.   RESPIRATORY: Non-labored effort. Decreased BS, scattered crackles.  CARDIOVASCULAR: S1, S2, regular rate and rhythm.    LABORATORY ASSESSMENT    Arterial Blood Gas  Recent Labs   Lab 04/19/21  1150   PH 7.51*   PCO2 38   PO2 65*   HCO3 30*   O2PER 60     CBC  Recent Labs   Lab 04/20/21  0710 04/19/21  0605 04/18/21  1300 04/18/21  0549 04/17/21  0556 " 04/17/21  0556   WBC 9.9 8.7  --  9.6  --  12.4*   RBC 3.11* 2.91*  --  2.83*  --  2.51*   HGB 8.8* 8.5* 8.6* 8.1*   < > 7.2*   HCT 26.7* 25.3*  --  24.3*  --  21.8*   MCV 86 87  --  86  --  87   MCH 28.3 29.2  --  28.6  --  28.7   MCHC 33.0 33.6  --  33.3  --  33.0   RDW 17.1* 17.3*  --  16.9*  --  17.7*   * 82*  --  69*  --  87*    < > = values in this interval not displayed.     BMP  Recent Labs   Lab 04/20/21  0710 04/19/21  2350 04/19/21  0605 04/18/21  0549 04/17/21  0556   *  --  132* 132* 134   POTASSIUM 3.7 3.5 3.1* 3.4 4.4   CHLORIDE 96  --  97 100 105   ESTIVEN 8.7  --  8.1* 8.0* 8.2*   CO2 30  --  30 28 24   BUN 21  --  24 25 29   CR 0.40*  --  0.46* 0.53 0.54   GLC 95  --  136* 94 79     INR  Recent Labs   Lab 04/16/21  0815   INR 1.18*      BNPNo lab results found in last 7 days.  VENOUS BLOOD GASESNo lab results found in last 7 days.    Additional labs and/or comments:      IMAGING      CT Chest 4/19 -  IMPRESSION:  1.  No pulmonary embolism demonstrated.  2.  Extensive bilateral pulmonary infiltrates. These are increased  from previous considerably.    CXR 4/19 -  IMPRESSION: Right PICC line tip in cavoatrial region. Shallow inspiration. Stable cardia mediastinal silhouette. Bilateral infiltrates.    CXR 4/17 -  IMPRESSION: Moderately extensive interstitial and airspace infiltrates  bilaterally similar to slightly worse than previous. Right-sided PICC  line tip in the low SVC.    PFT & OTHER TESTING       ASSESSMENT / PLAN      Pulmonary Diagnoses:  Abnl CT/CXR R91.8  Asthma unspec J45.909  Hypoxemia R09.02  Pneumonia unspec J18.9  Resp fail acute J96.00    Additional COVID-19 diagnoses:  Concern of possible exposure to COVID-19, Now RULED OUT Z03.818    ASSESSMENT: 75-year-old female with a history of asthma and obstructive sleep apnea on CPAP, recent diagnosis of glioblastoma status post resection 2/23/21 admitted with progressive dyspnea. Admission CT Chest showed bilateral patchy  groundglass opacities, negative for PE.  Initially treated with ceftriaxone and doxy. COVID19 negative 3/26 and 3/29.  Increased oxygen needs 3/29, ultimately requiring BiPAP, with repeat CXR showing increase in bilateral patchy opacities.  Found to have DVT 3/29.  ID consulted 3/29, high suspicion for PJP pneumonia (LDH high, high beta d glucan), unable to confirm with the sputum studies as patient has been unable to provide any sputum.  PJP suspected secondary to prolonged steroid course following neurosurgery, switched from ceftriaxone to zosyn 3/29 and initiated on high dose Bactrim and increased steroids.  Follow-up CXRs 4/2 and 4/5 show persistent patchy infiltrates bilaterally. Chest x-rays 4/2 and 4/5 showed persistent patchy infiltrates bilaterally.  Recent hospital course notable for retroperitoneal bleed due to anticoagulation, and increased shortness of breath due to volume overload.  Follow-up chest x-rays 4/17 and 4/19 showed increased interstitial infiltrates.  CT scan of the chest 4/19 shows extensive bilateral pulmonary infiltrates which have increased when compared to the prior CT chest 3/26.  Decompensation likely reflects worsening PJP pneumonia versus superimposed acute lung injury.  Patient is not currently a candidate for bronchoscopy due to high FiO2 requirements and need for BiPAP. If diagnostic specimens required, she will either need to transfer to ICU for elective intubation and bronchoscopy by the Critical Care service vs OLBx by Thoracic Surgery.  Agree with continuing current dose of Bactrim; recommend increasing steroid dosing pending further evaluation.    PLAN:  1. Adjust oxygen, keep SaO2 > 90%  2. Continue BiPAP.  3. Antibiotics - Bactrim  4. Steroids - increase SoluMedrol dose.  5. Diuresis - Lasix as ordered.  6. Await echocardiogram.    Case discussed with Dr. Bernabe.      Holden Jaime M.D.    [facility]  [phone]    (pager)  [phone]   (office)

## 2021-04-20 NOTE — PROGRESS NOTES
ELEANOR Jenkins at [facility] called and informed this writer that there is no available bed for pt, and don't expect to have a bed open overnight. Bed availability will be reevaluated tomorrow.

## 2021-04-20 NOTE — PROGRESS NOTES
Patient remained on BiPAP 14/4 55% for most of the day. Changed to HFNC @ 1745 @ 68%, Flow 50 lpm. rr 22. SpO2 95%.

## 2021-04-20 NOTE — PROGRESS NOTES
Case discussed with Dr Aguirre. Pt with PCP Pneumonia - becoming BiPAP dependent. Steroids increased yesterday already on extended bactrim per ID and being diuresed. At present no available bed in ICU to observe patient - agree with current management and recommend follow.  Bronch would require secure airway and would not preemptively intubate for a procedure.  Discussed could send resp pathogen from sputum to assess - is a send out but could non invasively evaluate for additional pathogen in tenuous status.     As beds available can readdress transfer, if decompensate and need intubation will address that as able then.     Anthony Pham MD

## 2021-04-20 NOTE — PROGRESS NOTES
"[facility]    Infectious Disease Progress Note    Date of Service (when I saw the patient): 04/20/2021     Assessment & Plan   Olga Bailey is a 75 year old female who was admitted on 3/26/2021.     Impression:  1. 75 y.o with recent diagnosis of craniotomy and resection of glioblastoma multiforme.   2. Has been on very high dose steroid taper for the past month PTA  since the craniotomy. Not on any bactrim prophylaxis before admission.    3. Admitted with shortness of breath.   4. Found to have bilateral ground glass infiltrates on the imaging, very hypoxic. Concern for PJP. Never able to give sputum for sample. COVID PCR negative x 3. LDH, Beta D glucan quite elevated.   5. Initial improvement on Bactrim and steroids, then a large spontaneous retroperitoneal bleed and now as the steroids were being tapered again hypoxic with progressive infiltrates.        Recommendations:   Last 48- 72 hours  more work of breathing initial suspicion was volume overload, but not improving with volume status management, ? due to steroid taper though with 3 weeks of PJP treatment this should not be the cause. Recommended reconsulting Pulm for bronch. Already done with with 3 weeks of Bactrim for treatment for PJP but given concurrent steroid use still on Bactrim.    On treatment with Bactrim for High suspicion for PJP pneumonia. LDH high, high beta d glucan unable to confirm with the sputum studies as patient has been unable to provide any sputum   Steroids for PJP continue to taper to be off steroids in next few days. Discussed with IM who also ran the course by neuro-onc.                    Per neuro onc last note her cancer related plan was as listed in the note below, I would recommend follow up with Neuro onc after discharge.    I am copying and pasting Neuro onc last note below:   \" PLAN  -CANCER-DIRECTED THERAPY-  Will initiate chemoradiotherapy with temozolomide 75mg/m2 (140mg).   -Instructed " "to start temozolomide the evening before the start date of radiation and continue daily until the completion of radiation.   -Take temozolomide on an empty stomach, 30 minutes after Zofran dosing.  -Additional temozolomide teaching performed by RN/ pharmacy staff.      -Initial labs ordered; CBC with differential, CMP, Hepatitis B serologies; NR.  -Will continue weekly CBC and repeat CMP at follow-up.     -Medications prescribed;        -Zofran 4mg (1 to 2 tabs qHS 30 minutes prior to chemotherapy and then PRN nausea).       -For lymphopenia, prophylactic antibiotics are recommended during concurrent treatment. Will start Sulfamethoxazole-trimethoprim (Bactrim) 800 mg-160 mg; 1 tab orally Monday, Wednesday, and Friday for pneumocystis prophylaxis. If thrombocytopenia becomes an issue, can change to Dapsone, Atovaquone, or Pentamidine.        -Docusate 100 mg; 1 cap orally 3 times a day (stool softener for constipation)       -Senna 8.6 m tabs orally at bedtime (laxative as needed for constipation)     -STEROIDS-  -Continue to wean dexamethasone as tolerated;                           4mg daily ( - 3/28)                          2mg daily (3/29 - )                          2mg on , , , then stop.   -Dose may increase while undergoing radiation.\"          Robyn Brnaham MD    Interval History    Continues to be hypoxic, requiring 7 L of oxygen   No fever   Creat stable   WBC normalized       Physical Exam   Temp: 98.8  F (37.1  C) Temp src: Axillary BP: 137/80 Pulse: 81   Resp: 29 SpO2: 92 % O2 Device: BiPAP/CPAP Oxygen Delivery: 7 LPM  Vitals:    21 0600 21 0600 21 0600   Weight: 74.3 kg (163 lb 12.8 oz) 77.8 kg (171 lb 8.3 oz) 73.5 kg (162 lb 0.6 oz)     Vital Signs with Ranges  Temp:  [97.6  F (36.4  C)-99.2  F (37.3  C)] 98.8  F (37.1  C)  Pulse:  [74-96] 81  Resp:  [23-40] 29  BP: (111-161)/(69-97) 137/80  FiO2 (%):  [55 %] 55 %  SpO2:  [92 %-97 %] 92 %    Constitutional: on " bipap  Lungs: diminished bilateral   Cardiovascular: Regular rate and rhythm, normal S1 and S2, and no murmur noted  Abdomen: Normal bowel sounds, soft, non-distended, non-tender  Skin: No rashes, no cyanosis, no edema  Other:    Medications     dextrose       - MEDICATION INSTRUCTIONS -       [Held by provider] parenteral nutrition - Clinimix E 30 mL/hr at 04/19/21 1700       [Held by provider] amLODIPine  10 mg Oral Daily     [Held by provider] busPIRone HCl  30 mg Oral BID     [Held by provider] FLUoxetine  80 mg Oral Daily     furosemide  40 mg Intravenous BID     insulin aspart  1-12 Units Subcutaneous Q4H     levETIRAcetam  250 mg Intravenous Q12H     [Held by provider] levETIRAcetam  250 mg Oral BID     [Held by provider] lipids  250 mL Intravenous Q24H     methylPREDNISolone  10 mg Intravenous Q12H     metoprolol  2.5 mg Intravenous Q6H     [Held by provider] metoprolol tartrate  12.5 mg Oral BID     [Held by provider] pantoprazole  40 mg Oral BID AC     pantoprazole (PROTONIX) IV  40 mg Intravenous BID w/meals     [Held by provider] predniSONE  10 mg Oral BID     senna-docusate  2 tablet Oral BID    Or     senna-docusate  2 tablet Oral BID     sodium chloride (PF)  3 mL Intracatheter Q8H     sodium phosphate  9 mmol Intravenous Once     sulfamethoxazole-trimethoprim  320 mg Intravenous Q12H       Data   All microbiology laboratory data reviewed.  Recent Labs   Lab Test 04/20/21  0710 04/19/21  0605 04/18/21  1300 04/18/21  0549   WBC 9.9 8.7  --  9.6   HGB 8.8* 8.5* 8.6* 8.1*   HCT 26.7* 25.3*  --  24.3*   MCV 86 87  --  86   * 82*  --  69*     Recent Labs   Lab Test 04/20/21  0710 04/19/21  0605 04/18/21  0549   CR 0.40* 0.46* 0.53     No lab results found.  Recent Labs   Lab Test 03/29/21 1951 03/29/21 1946   CULT No growth No growth       Attestation:  Total time on the floor involved in the patient's care: 35 minutes. Total time spent in counseling/care coordination: >50%

## 2021-04-20 NOTE — PLAN OF CARE
Pt is calm and cooperative, oriented x 3. AVSS remains on BiPAP at 14/7, 55%. Tolerating well. Lungs diminished, sinus rhythm. Denied pain. Turned and repositioned. Pure wick in place.

## 2021-04-20 NOTE — PROGRESS NOTES
[facility]    Medicine Progress Note - Hospitalist Service       Date of Admission:  3/26/2021  Assessment & Plan       75 year old female admitted on 3/26/2021.  Past medical history that is most notable for recent craniotomy and resection of glioblastoma multiforme, as well as uncomplicated asthma, who presents with dyspnea and is found to have acute bilateral pneumonia.     Acute hypoxic respiratory failure due to bilateral pneumonia, suspected PCP  - discussed with ID, all consistent with PCP  - Bactrim DS 2 tid would've been completed today 4/19 (21 of 21) however given increased O2 needs will continue BID bactrim  - continue increased steroids with solumedrol (per pulm)  - continue bipap as needed, HFNC in between as tolerated  - CT PE no clot, bilat infiltrates weres  - Pulmonology consulted-recommends intubation if going to proceed with bronchoscopy; I discussed with intensivist and ICU charge nurse regarding transfer when bed available.  I also contacted [place], [facility], and [facility] regarding ICU bed availability which continues to not exist.     Retroperitoneal bleed 2nd to Lovenox 4/14/2021 AM  - Hgb stable today (hgb 8.8 <-- 8.5 <-- 8.1 <-- 8.6)                Increased SOB, suspect mild fluid overload -- increased wt 2nd to blood and IV fluids and TPN  - CXR 4/18 with no new infiltrates  - IV lasix bid, reduced dose as weight comes down - possibly 4/21     Shock Liver with Elevated LFT's -- related to Retroperitoneal Bleed, resolving     Thrombocytopenia -- suspect consumption from bleeding  - follow CBC - stable/improved 4/20 - 102k     Leukocytosis --suspect stress related, now resolved                Retroperitoneal bleed 2nd to Lovenox 4/14/2021 AM  - Abd Pelvis CTA  without evidence of ongoing bleed                Ileus 2nd to bleed -- persistent, probable 2nd to retroperitoneal bleed  - restarted 4/18 TPN until taking adequate PO, but held as below  - resumed at half of goal  (30/hr) on 4/20 since she is requiring bipap again     Bilateral Lower Leg DVT on 3/29, hypercoagulable 2nd to GBM  - off Lovenox (last dose 4/14 AM) -- IR placed IVC filter 4/16  - consider Lovenox 40 mg subcutaneous in several days if hgb stable      Epistaxis  - now stable, off lovenox     Hyponatremia  - mild, 2nd to SIADH from stress and pulm process  - stable, low 130s primarily     Steroid induced hyperglycemia -- resolved  - restart insulin as need     Urinary incontinence -- has pure wick     Hypokalemia, variable - replacement protocol     GBM s/p resection 2/23/21  Followed by Dr. Baker of Neuro-Oncology.  She is in process of being set up to start chemotherapy and XRT possibly this Week  - has not yet initiated chemo (temozolomide) and planned radiation  - Family agrees on 2nd opinion with [facility] concerning GBM treatment, [place] requested Dr. Stephanie Baker consult [facility] to initiate process (the Patient's Ex-, Dr. Renaldo Dow, is eager to hear what they recommend)     Hypertension  - continue PTA amlodipine when taking PO (on bipap)     Sinus tachycardia -- clinically improved  - metoprolol 12.5 mg bid po or IV 2.5 q 6 given npo     Asthma  MARCELLE  - nebs prn  - resume CPAP with home setting 4/9     Depression and anxiety  Insomnia  - continue PTA Prozac and Buspar  - continue PTA Atarax for sleep  - prn lorazepam     GERD  - switch to PPI as above     Chronic anemia of chronic disease, with Acute blood loss  Baseline hgb 10-11 g/dL, stable around 8     Non-severe malnutrition  Noted decrease oral intake and drop in albumin, subcutaneous fat loss on exam.   - PICC line in place  - excellent oral intake, tapered off TPN 4/9, but restarted 4/18 as above  - 4/20 back on TPN at reduced rate to avoid overload and/or other problems with potential intubation and bronch possibly upcoming    Diet: Room Service  parenteral nutrition - Clinimix E  NPO for Medical/Clinical Reasons Except for: Meds, Ice  Chips  Advance Diet as Tolerated    DVT Prophylaxis: ivc filter  Martino Catheter: not present  Code Status: Full Code           Disposition Plan   Expected discharge: unclear, imc/icu care to continue for now  Entered: Duke Bernabe MD 04/20/2021, 4:55 PM       The patient's care was discussed with the Bedside Nurse, Patient, Patient's Family and Pulmonary, and ICU Consultant.    Duke Bernabe MD  Hospitalist Service  [facility]  Contact information available via [facility] Paging/Directory    ______________________________________________________________________    Interval History   Patient seen and examined this afternoon and earlier this morning.  She continues to be on BiPAP but with stable numbers.  She is more awake and alert today.  Denies any pain, fevers, or chills.  She remains in agreement with the current plan for probable intubation and bronchoscopy in the ICU when bed is available.  At request of patient's family have still been reaching out to other hospital systems for ICU bed availability-namely [facility], [facility], and [facility] have nothing available.  Discussed with pulmonology and concur with increased steroid dose.  Also discussed with intensivist and ICU charge nurse regarding transfer to the unit to complete bronchoscopy.    Data reviewed today: I reviewed all medications, new labs and imaging results over the last 24 hours. I personally reviewed no images or EKG's today.    Physical Exam   Vital Signs: Temp: 98.4  F (36.9  C) Temp src: Axillary BP: (!) 142/80 Pulse: 80   Resp: 30 SpO2: 96 % O2 Device: BiPAP/CPAP    Weight: 162 lbs .61 oz    Gen: NAD, pleasant, frail, mask on  HEENT: Normocephalic, EOMI, MMM  Resp: no focal crackles,  no wheezes, fairly comfortable with bipap mask on  CV: S1S2 heard, reg rhythm, reg rate  Abdo: soft, nontender, nondistended, bowel sounds present  Ext: calves nontender, well perfused  Neuro: Awake and alert, more awake today, seems  to be oriented and clear on what is going on, CN grossly intact, no facial asymmetry      Data   Recent Labs   Lab 21  0710 21  2350 21  0605 21  1300 21  0549 21  0815 21  0815 21  1627   WBC 9.9  --  8.7  --  9.6   < >  --    < >  --  16.1*   HGB 8.8*  --  8.5* 8.6* 8.1*   < >  --    < > 7.2* 7.3*   MCV 86  --  87  --  86   < >  --    < >  --  90   *  --  82*  --  69*   < >  --    < >  --  187   INR  --   --   --   --   --   --  1.18*  --   --   --    *  --  132*  --  132*   < >  --    < >  --  130*   POTASSIUM 3.7 3.5 3.1*  --  3.4   < >  --    < >  --  5.3   CHLORIDE 96  --  97  --  100   < >  --    < >  --  98   CO2 30  --  30  --  28   < >  --    < >  --  25   BUN 21  --  24  --  25   < >  --    < >  --  42*   CR 0.40*  --  0.46*  --  0.53   < >  --    < >  --  0.79   ANIONGAP 5  --  5  --  4   < >  --    < >  --  7   ESTIVEN 8.7  --  8.1*  --  8.0*   < >  --    < >  --  8.8   GLC 95  --  136*  --  94   < >  --    < >  --  240*   ALBUMIN 2.3*  --  2.3*  --  2.1*   < >  --    < >  --  2.6*   PROTTOTAL 5.6*  --  5.5*  --  5.1*   < >  --    < >  --  5.4*   BILITOTAL 0.5  --  0.3  --  0.7   < >  --    < >  --  0.2   ALKPHOS 83  --  76  --  69   < >  --   --   --  88   *  --  565*  --  717*   < >  --    < >  --  61*   AST 68*  --  110*  --  188*   < >  --    < >  --  22   TROPI  --   --   --   --   --   --   --   --  0.021 <0.015    < > = values in this interval not displayed.     Recent Results (from the past 24 hour(s))   Echo Limited    Narrative    [id number]  [id number]  [id number]  [id number]^ANIKET^MULU     [facility]  Echocardiography Laboratory  [address] [place], [place] [id number]     Name: RADHA RYAN  MRN: [id number]  : 1945  Study Date: 2021 09:53 AM  Age: 75 yrs  Gender: Female  Reason For Study: SOB  Ordering Physician: Mulu Bernabe  Referring Physician: Clinic,  [facility] Swann  Performed By: Faviola Murillo     BSA: 1.8 m2  Height: 63 in  Weight: 171 lb  BP: 135/97 mmHg  ______________________________________________________________________________  Procedure  Limited Portable Echo Adult. Optison (NDC #[id number]) given intravenously.  ______________________________________________________________________________  Interpretation Summary     Left ventricular systolic function is normal.The visual ejection fraction is  estimated at 55-60%.  The right ventricular systolic function is normal.  There is mild (1+) tricuspid regurgitation.  ______________________________________________________________________________  Left Ventricle  Left ventricular systolic function is normal. The visual ejection fraction is  estimated at 55-60%. No regional wall motion abnormalities noted.     Right Ventricle  The right ventricle is normal size. The right ventricular systolic function is  normal.     Atria  The left atrium is mildly dilated. Right atrial size is normal.     Mitral Valve  There is trace mitral regurgitation.     Tricuspid Valve  There is mild (1+) tricuspid regurgitation. The right ventricular systolic  pressure is approximated at 20.8 mmHg plus the right atrial pressure.     Aortic Valve  The aortic valve is trileaflet. No aortic regurgitation is present. No aortic  stenosis is present.     Vessels  The inferior vena cava was normal in size with preserved respiratory  variability.     Pericardium  There is no pericardial effusion.     ______________________________________________________________________________  Doppler Measurements & Calculations  TR max julissa: 228.0 cm/sec  TR max P.8 mmHg     ______________________________________________________________________________  Report approved by: Severo Brown 2021 12:09 PM

## 2021-04-20 NOTE — PLAN OF CARE
Pt alert and oriented, continues on Bipap, lungs diminished, O2 sats mid 90's, will trial on high flow per Dr Bernabe's request, pt NPO, mouth cares done, complains of abd pain, dilaudid given with good results, brad wick in place, good urine output, no BM this shift, phos replaced this shift, redraw in am, TPN will continue at 30 per MD orders, repositioned pt every two hours, right groin site unchanged,

## 2021-04-20 NOTE — PROGRESS NOTES
"Writer contacted [facility] for an update. Per chart review, patient was to be transferred to [facility] at family's request. Now appears bed may not be available.     Writer spoke with [facility] who reports her father and brother are requesting transfer, but now they were told bed is not available and now Olga is requiring Bipap again. [facility] states she is, \"at a loss.\"     Will continue to defer discharge/transfer plan to inpatient team, but will route to Dr. Baker as update.    Louise Jerry, JIANN, RN, PHN, OCN  Oncology Care Coordinator  [facility]      "

## 2021-04-20 NOTE — PROGRESS NOTES
Following LFTs- continue to improve and trend down.     Belen Tripp PA-C  [facility]  [phone]

## 2021-04-21 ENCOUNTER — APPOINTMENT (OUTPATIENT)
Dept: SPEECH THERAPY | Facility: CLINIC | Age: 76
DRG: 166 | End: 2021-04-21
Attending: HOSPITALIST
Payer: MEDICARE

## 2021-04-21 LAB
ALBUMIN SERPL-MCNC: 2.3 G/DL (ref 3.4–5)
ALP SERPL-CCNC: 95 U/L (ref 40–150)
ALT SERPL W P-5'-P-CCNC: 325 U/L (ref 0–50)
ANION GAP SERPL CALCULATED.3IONS-SCNC: 5 MMOL/L (ref 3–14)
ANISOCYTOSIS BLD QL SMEAR: SLIGHT
AST SERPL W P-5'-P-CCNC: 44 U/L (ref 0–45)
BASOPHILS # BLD AUTO: 0 10E9/L (ref 0–0.2)
BASOPHILS NFR BLD AUTO: 0 %
BILIRUB SERPL-MCNC: 0.4 MG/DL (ref 0.2–1.3)
BUN SERPL-MCNC: 22 MG/DL (ref 7–30)
CALCIUM SERPL-MCNC: 9.2 MG/DL (ref 8.5–10.1)
CHLORIDE SERPL-SCNC: 96 MMOL/L (ref 94–109)
CO2 SERPL-SCNC: 32 MMOL/L (ref 20–32)
CREAT SERPL-MCNC: 0.45 MG/DL (ref 0.52–1.04)
DIFFERENTIAL METHOD BLD: ABNORMAL
EOSINOPHIL # BLD AUTO: 0 10E9/L (ref 0–0.7)
EOSINOPHIL NFR BLD AUTO: 0 %
ERYTHROCYTE [DISTWIDTH] IN BLOOD BY AUTOMATED COUNT: 16.7 % (ref 10–15)
GFR SERPL CREATININE-BSD FRML MDRD: >90 ML/MIN/{1.73_M2}
GLUCOSE BLDC GLUCOMTR-MCNC: 100 MG/DL (ref 70–99)
GLUCOSE BLDC GLUCOMTR-MCNC: 124 MG/DL (ref 70–99)
GLUCOSE BLDC GLUCOMTR-MCNC: 133 MG/DL (ref 70–99)
GLUCOSE BLDC GLUCOMTR-MCNC: 196 MG/DL (ref 70–99)
GLUCOSE BLDC GLUCOMTR-MCNC: 203 MG/DL (ref 70–99)
GLUCOSE BLDC GLUCOMTR-MCNC: 229 MG/DL (ref 70–99)
GLUCOSE SERPL-MCNC: 113 MG/DL (ref 70–99)
HCT VFR BLD AUTO: 27.5 % (ref 35–47)
HGB BLD-MCNC: 8.9 G/DL (ref 11.7–15.7)
LYMPHOCYTES # BLD AUTO: 0.3 10E9/L (ref 0.8–5.3)
LYMPHOCYTES NFR BLD AUTO: 3 %
MAGNESIUM SERPL-MCNC: 2.1 MG/DL (ref 1.6–2.3)
MCH RBC QN AUTO: 27.9 PG (ref 26.5–33)
MCHC RBC AUTO-ENTMCNC: 32.4 G/DL (ref 31.5–36.5)
MCV RBC AUTO: 86 FL (ref 78–100)
METAMYELOCYTES # BLD: 0.1 10E9/L
METAMYELOCYTES NFR BLD MANUAL: 1 %
MICROCYTES BLD QL SMEAR: PRESENT
MONOCYTES # BLD AUTO: 0 10E9/L (ref 0–1.3)
MONOCYTES NFR BLD AUTO: 0 %
NEUTROPHILS # BLD AUTO: 11.1 10E9/L (ref 1.6–8.3)
NEUTROPHILS NFR BLD AUTO: 96 %
PHOSPHATE SERPL-MCNC: 2.7 MG/DL (ref 2.5–4.5)
PLATELET # BLD AUTO: 139 10E9/L (ref 150–450)
PLATELET # BLD EST: ABNORMAL 10*3/UL
POTASSIUM SERPL-SCNC: 3.9 MMOL/L (ref 3.4–5.3)
PROCALCITONIN SERPL-MCNC: 0.07 NG/ML
PROT SERPL-MCNC: 6 G/DL (ref 6.8–8.8)
RBC # BLD AUTO: 3.19 10E12/L (ref 3.8–5.2)
SODIUM SERPL-SCNC: 133 MMOL/L (ref 133–144)
WBC # BLD AUTO: 11.6 10E9/L (ref 4–11)

## 2021-04-21 PROCEDURE — 999N000157 HC STATISTIC RCP TIME EA 10 MIN

## 2021-04-21 PROCEDURE — 250N000011 HC RX IP 250 OP 636: Performed by: HOSPITALIST

## 2021-04-21 PROCEDURE — 250N000013 HC RX MED GY IP 250 OP 250 PS 637: Performed by: INTERNAL MEDICINE

## 2021-04-21 PROCEDURE — 250N000009 HC RX 250: Performed by: HOSPITALIST

## 2021-04-21 PROCEDURE — 999N001017 HC STATISTIC GLUCOSE BY METER IP

## 2021-04-21 PROCEDURE — 999N000190 HC STATISTIC VAT ROUNDS

## 2021-04-21 PROCEDURE — 250N000011 HC RX IP 250 OP 636: Performed by: INTERNAL MEDICINE

## 2021-04-21 PROCEDURE — 258N000003 HC RX IP 258 OP 636: Performed by: HOSPITALIST

## 2021-04-21 PROCEDURE — 85025 COMPLETE CBC W/AUTO DIFF WBC: CPT | Performed by: HOSPITALIST

## 2021-04-21 PROCEDURE — C9113 INJ PANTOPRAZOLE SODIUM, VIA: HCPCS | Performed by: HOSPITALIST

## 2021-04-21 PROCEDURE — 120N000013 HC R&B IMCU

## 2021-04-21 PROCEDURE — 94660 CPAP INITIATION&MGMT: CPT

## 2021-04-21 PROCEDURE — 84145 PROCALCITONIN (PCT): CPT | Performed by: HOSPITALIST

## 2021-04-21 PROCEDURE — 80053 COMPREHEN METABOLIC PANEL: CPT | Performed by: HOSPITALIST

## 2021-04-21 PROCEDURE — 250N000013 HC RX MED GY IP 250 OP 250 PS 637: Performed by: HOSPITALIST

## 2021-04-21 PROCEDURE — 84100 ASSAY OF PHOSPHORUS: CPT | Performed by: HOSPITALIST

## 2021-04-21 PROCEDURE — 83735 ASSAY OF MAGNESIUM: CPT | Performed by: HOSPITALIST

## 2021-04-21 PROCEDURE — 999N000215 HC STATISTIC HFNC ADULT NON-CPAP

## 2021-04-21 PROCEDURE — 99233 SBSQ HOSP IP/OBS HIGH 50: CPT | Performed by: HOSPITALIST

## 2021-04-21 PROCEDURE — 92526 ORAL FUNCTION THERAPY: CPT | Mod: GN | Performed by: SPEECH-LANGUAGE PATHOLOGIST

## 2021-04-21 RX ADMIN — I.V. FAT EMULSION 250 ML: 20 EMULSION INTRAVENOUS at 21:05

## 2021-04-21 RX ADMIN — METOPROLOL TARTRATE 2.5 MG: 1 INJECTION, SOLUTION INTRAVENOUS at 20:42

## 2021-04-21 RX ADMIN — METHYLPREDNISOLONE SODIUM SUCCINATE 62.5 MG: 125 INJECTION, POWDER, FOR SOLUTION INTRAMUSCULAR; INTRAVENOUS at 02:24

## 2021-04-21 RX ADMIN — FUROSEMIDE 40 MG: 10 INJECTION, SOLUTION INTRAMUSCULAR; INTRAVENOUS at 16:22

## 2021-04-21 RX ADMIN — PANTOPRAZOLE SODIUM 40 MG: 40 INJECTION, POWDER, FOR SOLUTION INTRAVENOUS at 09:09

## 2021-04-21 RX ADMIN — METHYLPREDNISOLONE SODIUM SUCCINATE 62.5 MG: 125 INJECTION, POWDER, FOR SOLUTION INTRAMUSCULAR; INTRAVENOUS at 18:08

## 2021-04-21 RX ADMIN — METOPROLOL TARTRATE 2.5 MG: 1 INJECTION, SOLUTION INTRAVENOUS at 14:02

## 2021-04-21 RX ADMIN — SULFAMETHOXAZOLE AND TRIMETHOPRIM 320 MG: 80; 16 INJECTION, SOLUTION, CONCENTRATE INTRAVENOUS at 09:24

## 2021-04-21 RX ADMIN — SENNOSIDES AND DOCUSATE SODIUM 2 TABLET: 8.6; 5 TABLET ORAL at 20:41

## 2021-04-21 RX ADMIN — PANTOPRAZOLE SODIUM 40 MG: 40 INJECTION, POWDER, FOR SOLUTION INTRAVENOUS at 18:05

## 2021-04-21 RX ADMIN — METOPROLOL TARTRATE 2.5 MG: 1 INJECTION, SOLUTION INTRAVENOUS at 02:24

## 2021-04-21 RX ADMIN — LEVETIRACETAM 250 MG: 100 INJECTION, SOLUTION INTRAVENOUS at 20:42

## 2021-04-21 RX ADMIN — HYDROXYZINE HYDROCHLORIDE 50 MG: 25 TABLET, FILM COATED ORAL at 20:42

## 2021-04-21 RX ADMIN — POLYETHYLENE GLYCOL 3350 17 G: 17 POWDER, FOR SOLUTION ORAL at 13:59

## 2021-04-21 RX ADMIN — LEVETIRACETAM 250 MG: 100 INJECTION, SOLUTION INTRAVENOUS at 09:27

## 2021-04-21 RX ADMIN — SULFAMETHOXAZOLE AND TRIMETHOPRIM 320 MG: 80; 16 INJECTION, SOLUTION, CONCENTRATE INTRAVENOUS at 20:42

## 2021-04-21 RX ADMIN — METHYLPREDNISOLONE SODIUM SUCCINATE 62.5 MG: 125 INJECTION, POWDER, FOR SOLUTION INTRAMUSCULAR; INTRAVENOUS at 10:51

## 2021-04-21 RX ADMIN — FUROSEMIDE 40 MG: 10 INJECTION, SOLUTION INTRAMUSCULAR; INTRAVENOUS at 09:11

## 2021-04-21 RX ADMIN — METOPROLOL TARTRATE 2.5 MG: 1 INJECTION, SOLUTION INTRAVENOUS at 09:15

## 2021-04-21 ASSESSMENT — ACTIVITIES OF DAILY LIVING (ADL)
ADLS_ACUITY_SCORE: 19

## 2021-04-21 NOTE — PROGRESS NOTES
A&O x3. VSS on High Flow. Tele NSR. CMS intact. Neuros intact. Lungs clear. BS audible, BM-, flatus-. Groin site soft, no hematoma.  - N/V. Pt on IV lasix BID, Has external female catheter, is voiding adequately. Denied having pain. Assist of 2 for repositioning.      Pt made good progress today. She was weaned off of BIPAP at 10:15 am. She has tolerated High flow ever since. Pt diet was upgraded to Full liquid (nectar) now that she is off of Bipap. Pt is tolerating full liquid, nectar thickened liquid diet. Pt requires 1:1 assist to spoon feed thickened liquids. She needs reminders to go slow so she doesn't aspirate or desat.

## 2021-04-21 NOTE — PROGRESS NOTES
"[facility]    Infectious Disease Progress Note    Date of Service (when I saw the patient): 04/21/2021     Assessment & Plan   Olga Bailey is a 75 year old female who was admitted on 3/26/2021.     Impression:  1. 75 y.o with recent diagnosis of craniotomy and resection of glioblastoma multiforme.   2. Has been on very high dose steroid taper for the past month PTA  since the craniotomy. Not on any bactrim prophylaxis before admission.    3. Admitted with shortness of breath.   4. Found to have bilateral ground glass infiltrates on the imaging, very hypoxic. Concern for PJP. Never able to give sputum for sample. COVID PCR negative x 3. LDH, Beta D glucan quite elevated.   5. Initial improvement on Bactrim and steroids, then a large spontaneous retroperitoneal bleed and now as the steroids were being tapered again hypoxic with progressive infiltrates.        Recommendations:   Last 3/ 4 days  more work of breathing initial suspicion was volume overload, but not improving with volume status management, ? due to steroid taper though with 3 weeks of PJP treatment this should not be the cause.   pulm reconsulted, notes reviewed. Already done with with 3 weeks of Bactrim for treatment for PJP but given concurrent steroid use still on Bactrim. Noted dose was increased again for the steroids.     On treatment with Bactrim for High suspicion for PJP pneumonia. LDH high, high beta d glucan unable to confirm with the sputum studies as patient has been unable to provide any sputum   Steroids for PJP continue to taper to be off steroids in next few days. Discussed with IM who also ran the course by neuro-onc.      Wbc up slightly most likely due to the increase in the steroids                 Per neuro onc last note her cancer related plan was as listed in the note below, I would recommend follow up with Neuro onc after discharge.    I am copying and pasting Neuro onc last note below:   \" " "PLAN  -CANCER-DIRECTED THERAPY-  Will initiate chemoradiotherapy with temozolomide 75mg/m2 (140mg).   -Instructed to start temozolomide the evening before the start date of radiation and continue daily until the completion of radiation.   -Take temozolomide on an empty stomach, 30 minutes after Zofran dosing.  -Additional temozolomide teaching performed by RN/ pharmacy staff.      -Initial labs ordered; CBC with differential, CMP, Hepatitis B serologies; NR.  -Will continue weekly CBC and repeat CMP at follow-up.     -Medications prescribed;        -Zofran 4mg (1 to 2 tabs qHS 30 minutes prior to chemotherapy and then PRN nausea).       -For lymphopenia, prophylactic antibiotics are recommended during concurrent treatment. Will start Sulfamethoxazole-trimethoprim (Bactrim) 800 mg-160 mg; 1 tab orally Monday, Wednesday, and Friday for pneumocystis prophylaxis. If thrombocytopenia becomes an issue, can change to Dapsone, Atovaquone, or Pentamidine.        -Docusate 100 mg; 1 cap orally 3 times a day (stool softener for constipation)       -Senna 8.6 m tabs orally at bedtime (laxative as needed for constipation)     -STEROIDS-  -Continue to wean dexamethasone as tolerated;                           4mg daily ( - 3/28)                          2mg daily (3/29 - )                          2mg on , , , then stop.   -Dose may increase while undergoing radiation.\"          Robyn Branham MD    Interval History    Continues to be hypoxic, slightly better with increase dose of steroids   No fever   Creat stable   WBC normalized       Physical Exam   Temp: 99.6  F (37.6  C) Temp src: Oral BP: 120/83 Pulse: 72   Resp: (!) 36 SpO2: 95 % O2 Device: High Flow Nasal Cannula (HFNC) Oxygen Delivery: 45 LPM  Vitals:    21 0600 21 0600 21 0600   Weight: 74.3 kg (163 lb 12.8 oz) 77.8 kg (171 lb 8.3 oz) 73.5 kg (162 lb 0.6 oz)     Vital Signs with Ranges  Temp:  [97.5  F (36.4  C)-99.6  F (37.6  C)] " 99.6  F (37.6  C)  Pulse:  [70-84] 72  Resp:  [20-36] 36  BP: (120-142)/(68-88) 120/83  FiO2 (%):  [67 %-70 %] 70 %  SpO2:  [89 %-96 %] 95 %    Constitutional: on bipap  Lungs: diminished bilateral   Cardiovascular: Regular rate and rhythm, normal S1 and S2, and no murmur noted  Abdomen: Normal bowel sounds, soft, non-distended, non-tender  Skin: No rashes, no cyanosis, no edema  Other:    Medications     dextrose       - MEDICATION INSTRUCTIONS -       parenteral nutrition - Clinimix E 30 mL/hr at 04/20/21 2045       [Held by provider] amLODIPine  10 mg Oral Daily     [Held by provider] busPIRone HCl  30 mg Oral BID     FLUoxetine  80 mg Oral Daily     furosemide  40 mg Intravenous BID     insulin aspart  1-12 Units Subcutaneous Q4H     levETIRAcetam  250 mg Intravenous Q12H     [Held by provider] levETIRAcetam  250 mg Oral BID     lipids  250 mL Intravenous Q24H     methylPREDNISolone  62.5 mg Intravenous Q8H     metoprolol  2.5 mg Intravenous Q6H     [Held by provider] metoprolol tartrate  12.5 mg Oral BID     [Held by provider] pantoprazole  40 mg Oral BID AC     pantoprazole (PROTONIX) IV  40 mg Intravenous BID w/meals     senna-docusate  2 tablet Oral BID    Or     senna-docusate  2 tablet Oral BID     sodium chloride (PF)  3 mL Intracatheter Q8H     sulfamethoxazole-trimethoprim  320 mg Intravenous Q12H       Data   All microbiology laboratory data reviewed.  Recent Labs   Lab Test 04/21/21 0630 04/20/21  0710 04/19/21  0605   WBC 11.6* 9.9 8.7   HGB 8.9* 8.8* 8.5*   HCT 27.5* 26.7* 25.3*   MCV 86 86 87   * 102* 82*     Recent Labs   Lab Test 04/21/21 0630 04/20/21  0710 04/19/21  0605   CR 0.45* 0.40* 0.46*     No lab results found.  Recent Labs   Lab Test 03/29/21 1951 03/29/21 1946   CULT No growth No growth       Attestation:  Total time on the floor involved in the patient's care: 35 minutes. Total time spent in counseling/care coordination: >50%

## 2021-04-21 NOTE — PLAN OF CARE
VSS on BiPAP, high flow worn for a few hours in evening. Tele: NSR. A/Ox 3. R. Groin incision bruised. CMS intact. Dilaudid 0.2mg available for pain. Up with 2. NPO. Oral cares completed. Denied N&V. +gas, -bm. Purewick in place. LS dim. Will continue to monitor.

## 2021-04-21 NOTE — PROGRESS NOTES
[facility]    Medicine Progress Note - Hospitalist Service       Date of Admission:  3/26/2021  Assessment & Plan       75 year old female admitted on 3/26/2021.  Past medical history that is most notable for recent craniotomy and resection of glioblastoma multiforme, as well as uncomplicated asthma, who presents with dyspnea and is found to have acute bilateral pneumonia.     Acute hypoxic respiratory failure due to bilateral pneumonia, suspected PCP  - discussed with ID, all consistent with PCP  - Bactrim DS 2 tid would've been completed today 4/19 (21 of 21) however given increased O2 needs will continue BID bactrim - defer to ID whether broadened coverage to be considered  - 4/21 - continue increased steroids with solumedrol (per pulm)  - continue bipap as needed, HFNC in between as tolerated  - CT PE no clot, bilat infiltrates weres  - Pulmonology also following  - 4/21 97% on fiO2 0.55 bipap, can titrate down and do HF as tolerated     Retroperitoneal bleed 2nd to Lovenox 4/14/2021 AM - resolving  Chronic anemia of chronic disease, with Acute blood loss  Baseline hgb 10-11 g/dL, stable around 8 - 9   - Hgb stable today (hgb 8.9 <-- 8.8 <-- 8.5 <-- 8.1 <-- 8.6)                Increased SOB, suspect mild fluid overload (~4/17) -- increased wt 2nd to blood and IV fluids and TPN  - CXR 4/18 with no new infiltrates  - IV lasix bid, reduced dose as weight comes down - to be considered, for now will continue     Non-severe malnutrition  Noted decrease oral intake and drop in albumin, subcutaneous fat loss on exam.   - PICC line in place  - excellent oral intake, tapered off TPN 4/9, but restarted 4/18 as above  - 4/20 back on TPN at reduced rate to avoid overload and/or other problems with potential intubation and bronch possibly upcoming.  ADDENDUM - improvement in oxygenation as above, worked with SLP, some PO intake trial today/tonight. Will continue TPN at 30/hr for tonight and  re-evaluate tomorrow. Discussed with pharmacy.     Shock Liver with Elevated LFT's -- related to Retroperitoneal Bleed, resolving     Thrombocytopenia -- suspect consumption from bleeding  - follow CBC - stable/improved 4/20 - 102k --> 139k 4/21     Leukocytosis --suspect stress related, now resolved                Retroperitoneal bleed 2nd to Lovenox 4/14/2021 AM  - Abd Pelvis CTA  without evidence of ongoing bleed                Ileus 2nd to bleed -- persistent, probable 2nd to retroperitoneal bleed  - restarted 4/18 TPN until taking adequate PO, but held as below  - resumed at half of goal (30/hr) on 4/20 since she is requiring bipap again  - continue scheduled / prn bowel regimen as able     Bilateral Lower Leg DVT on 3/29, hypercoagulable 2nd to GBM  - off Lovenox (last dose 4/14 AM) -- IR placed IVC filter 4/16  - consider Lovenox 40 mg subcutaneous in several days if hgb stable      Epistaxis  - now stable, off lovenox     Hyponatremia  - mild, 2nd to SIADH from stress and pulm process  - stable, low 130s primarily     Steroid induced hyperglycemia -- resolved  - restart insulin as need     Urinary incontinence -- has pure wick     Hypokalemia, variable - replacement protocol     GBM s/p resection 2/23/21  Followed by Dr. Baker of Neuro-Oncology.  She is in process of being set up to start chemotherapy and XRT possibly this Week  - has not yet initiated chemo (temozolomide) and planned radiation  - Family agrees on 2nd opinion with [facility] concerning GBM treatment, [facility] requested Dr. Stephanie Baker consult [facility] to initiate process (the Patient's Ex-, Dr. Renaldo Dow, is eager to hear what they recommend)     Hypertension  - continue PTA amlodipine when taking PO (on bipap)     Sinus tachycardia -- clinically improved  - metoprolol 12.5 mg bid po or IV 2.5 q 6 given npo     Asthma  MARCELLE  - nebs prn  - resume CPAP with home setting 4/9     Depression and anxiety  Insomnia  - continue PTA  Prozac and Buspar (can unhold orders if ok with speech eval)  - continue PTA Atarax for sleep  - prn lorazepam     GERD  - switch to PPI as above         Diet: Room Service  parenteral nutrition - Clinimix E  NPO for Medical/Clinical Reasons Except for: Meds, Ice Chips  Advance Diet as Tolerated    DVT Prophylaxis: IVC filter  Martino Catheter: not present  Code Status: Full Code           Disposition Plan   Expected discharge: 2+ days, pending clinical course, consultant recs  Entered: Duke Bernabe MD 04/21/2021, 8:06 AM       The patient's care was discussed with the Bedside Nurse, Patient and Patient's Family.    Duke Bernabe MD  Hospitalist Service  [facility]  Contact information available via [facility] Paging/Directory    ______________________________________________________________________    Interval History   Seen and examined this morning. About the same overall, still awaiting BM, no increased abdo distension or pain. On bipap 0.55 fio2 with 96 - 97% o2 sat, can wean down and trial HFNC again.  Denies new pain, fevers or chills.     Patient is not voicing desire to transfer however it has been explored at request of family, particularly when she was less awake and alert. I have called [place] again today, at family request, and there are still no ICU beds available.  Will consider ICU transfer here at [facility] if available and especially if indicated.     Data reviewed today: I reviewed all medications, new labs and imaging results over the last 24 hours. I personally reviewed no images or EKG's today.    Physical Exam   Vital Signs: Temp: 98.2  F (36.8  C) Temp src: Axillary BP: (!) 141/88 Pulse: 70   Resp: 23 SpO2: 94 % O2 Device: BiPAP/CPAP Oxygen Delivery: 50 LPM  Weight: 162 lbs .61 oz    Gen: NAD, bipap mask on  HEENT: Normocephalic, EOMI, MMM  Resp: no focal anterior crackles,  no wheezes, no increased work of resp  CV: S1S2 heard, reg rhythm, reg rate  Abdo: soft,  nontender, nondistended, bowel sounds present  Ext: calves nontender, well perfused  Neuro: Awake and alert, CN grossly intact, no facial asymmetry, no new focal deficits appreciated      Data   Recent Labs   Lab 21  0630 21  0710 21  2350 21  0605 21  0815 21  0815 21  21521  21521  1627   WBC 11.6* 9.9  --  8.7   < >  --    < >  --  16.1*   HGB 8.9* 8.8*  --  8.5*   < >  --    < > 7.2* 7.3*   MCV 86 86  --  87   < >  --    < >  --  90   * 102*  --  82*   < >  --    < >  --  187   INR  --   --   --   --   --  1.18*  --   --   --     131*  --  132*   < >  --    < >  --  130*   POTASSIUM 3.9 3.7 3.5 3.1*   < >  --    < >  --  5.3   CHLORIDE 96 96  --  97   < >  --    < >  --  98   CO2 32 30  --  30   < >  --    < >  --  25   BUN 22 21  --  24   < >  --    < >  --  42*   CR 0.45* 0.40*  --  0.46*   < >  --    < >  --  0.79   ANIONGAP 5 5  --  5   < >  --    < >  --  7   ESTIVEN 9.2 8.7  --  8.1*   < >  --    < >  --  8.8   * 95  --  136*   < >  --    < >  --  240*   ALBUMIN 2.3* 2.3*  --  2.3*   < >  --    < >  --  2.6*   PROTTOTAL 6.0* 5.6*  --  5.5*   < >  --    < >  --  5.4*   BILITOTAL 0.4 0.5  --  0.3   < >  --    < >  --  0.2   ALKPHOS 95 83  --  76   < >  --   --   --  88   * 411*  --  565*   < >  --    < >  --  61*   AST 44 68*  --  110*   < >  --    < >  --  22   TROPI  --   --   --   --   --   --   --  0.021 <0.015    < > = values in this interval not displayed.     Recent Results (from the past 24 hour(s))   Echo Limited    Narrative    [id number]  [id number]  [id number]  [id number]^ANIKET^MULU [facility]  Echocardiography Laboratory  [address] [place] [address]     Name: RADHA RYAN  MRN: [id number]  : 1945  Study Date: 2021 09:53 AM  Age: 75 yrs  Gender: Female  Reason For Study: SOB  Ordering Physician: Mulu Bernabe  Referring Physician: Enrique Puga  By: Faviola Murillo     BSA: 1.8 m2  Height: 63 in  Weight: 171 lb  BP: 135/97 mmHg  ______________________________________________________________________________  Procedure  Limited Portable Echo Adult. Optison (NDC #[id number]) given intravenously.  ______________________________________________________________________________  Interpretation Summary     Left ventricular systolic function is normal.The visual ejection fraction is  estimated at 55-60%.  The right ventricular systolic function is normal.  There is mild (1+) tricuspid regurgitation.  ______________________________________________________________________________  Left Ventricle  Left ventricular systolic function is normal. The visual ejection fraction is  estimated at 55-60%. No regional wall motion abnormalities noted.     Right Ventricle  The right ventricle is normal size. The right ventricular systolic function is  normal.     Atria  The left atrium is mildly dilated. Right atrial size is normal.     Mitral Valve  There is trace mitral regurgitation.     Tricuspid Valve  There is mild (1+) tricuspid regurgitation. The right ventricular systolic  pressure is approximated at 20.8 mmHg plus the right atrial pressure.     Aortic Valve  The aortic valve is trileaflet. No aortic regurgitation is present. No aortic  stenosis is present.     Vessels  The inferior vena cava was normal in size with preserved respiratory  variability.     Pericardium  There is no pericardial effusion.     ______________________________________________________________________________  Doppler Measurements & Calculations  TR max julissa: 228.0 cm/sec  TR max P.8 mmHg     ______________________________________________________________________________  Report approved by: Severo Brown 2021 12:09 PM

## 2021-04-21 NOTE — PROGRESS NOTES
Pt Bipap checked. Asked pt if she would like to switch to HFNC at this time. She refused. Sttd she would like to continue to rest. I will recheck later to try to transition her to HFNC.    Jayna De La Cruz, RT

## 2021-04-21 NOTE — PROGRESS NOTES
HFNC placed on patient at 45 LPM 70%. Pt Spo2 94%.  Optifoam gentle placed behind ears. Mepilex lite on cheeks. RT will continue to follow and monitor.    Jayna De La Cruz, RT  4/21/2021  10:15 AM

## 2021-04-21 NOTE — PROGRESS NOTES
"PULMONOLOGY PROGRESS NOTE    Date of Admission: 3/26/2021    CC/Reason for Hospital visit: Acute hypoxic respiratory failure, suspected PJP  SUBJECTIVE      Events of last night reviewed. On BiPAP overnight. Stable night. No new respiratory problems. Back on HFNC this AM. States breathing is slightly better today.    ROS: A Problem Pertinent review of systems was negative except for items noted in HPI.  Past Medical, Family, and Social/Substance History has been reviewed: No interval changes.    OBJECTIVE   Vital signs:  Temp: 99.6  F (37.6  C) Temp src: Oral BP: 120/83 Pulse: 72   Resp: (!) 36 SpO2: 95 % O2 Device: High Flow Nasal Cannula (HFNC) Oxygen Delivery: 45 LPM Height: 160 cm (5' 3\") Weight: 73.5 kg (162 lb 0.6 oz)  Estimated body mass index is 28.7 kg/m  as calculated from the following:    Height as of this encounter: 1.6 m (5' 3\").    Weight as of this encounter: 73.5 kg (162 lb 0.6 oz).        I/O last 3 completed shifts:  In: 480 [I.V.:300]  Out: 460 [Urine:460]      CONSTITUTIONAL/GENERAL APPEARANCE: Aler female. No Apparent Distress.  PSYCHIATRIC: Pleasant and appropriate mood and affect. Oriented x 3.  EARS, NOSE,THROAT,MOUTH: External ears and nose overall normal. Normal oral mucosa.   NECK: Neck appearance normal. No neck masses and the thyroid is not enlarged.   RESPIRATORY: Non-labored effort. Decreased BS, scattered crackles, no change.  CARDIOVASCULAR: S1, S2, regular rate and rhythm.    LABORATORY ASSESSMENT    Arterial Blood Gas  Recent Labs   Lab 04/19/21  1150   PH 7.51*   PCO2 38   PO2 65*   HCO3 30*   O2PER 60     CBC  Recent Labs   Lab 04/21/21  0630 04/20/21  0710 04/19/21  0605 04/18/21  1300 04/18/21  0549   WBC 11.6* 9.9 8.7  --  9.6   RBC 3.19* 3.11* 2.91*  --  2.83*   HGB 8.9* 8.8* 8.5* 8.6* 8.1*   HCT 27.5* 26.7* 25.3*  --  24.3*   MCV 86 86 87  --  86   MCH 27.9 28.3 29.2  --  28.6   MCHC 32.4 33.0 33.6  --  33.3   RDW 16.7* 17.1* 17.3*  --  16.9*   * 102* 82*  --  69* "     BMP  Recent Labs   Lab 04/21/21  0630 04/20/21  0710 04/19/21  2350 04/19/21  0605 04/18/21  0549    131*  --  132* 132*   POTASSIUM 3.9 3.7 3.5 3.1* 3.4   CHLORIDE 96 96  --  97 100   ESTIVEN 9.2 8.7  --  8.1* 8.0*   CO2 32 30  --  30 28   BUN 22 21  --  24 25   CR 0.45* 0.40*  --  0.46* 0.53   * 95  --  136* 94     INR  Recent Labs   Lab 04/16/21  0815   INR 1.18*      BNPNo lab results found in last 7 days.  VENOUS BLOOD GASESNo lab results found in last 7 days.    Additional labs and/or comments:      IMAGING      CT Chest 4/19 -  IMPRESSION:  1.  No pulmonary embolism demonstrated.  2.  Extensive bilateral pulmonary infiltrates. These are increased  from previous considerably.    CXR 4/19 -  IMPRESSION: Right PICC line tip in cavoatrial region. Shallow inspiration. Stable cardia mediastinal silhouette. Bilateral infiltrates.    CXR 4/17 -  IMPRESSION: Moderately extensive interstitial and airspace infiltrates  bilaterally similar to slightly worse than previous. Right-sided PICC  line tip in the low SVC.    PFT & OTHER TESTING     Echo 4/20 -  Interpretation Summary  Left ventricular systolic function is normal.The visual ejection fraction is  estimated at 55-60%.  The right ventricular systolic function is normal.  There is mild (1+) tricuspid regurgitation.    ASSESSMENT / PLAN      Pulmonary Diagnoses:  Abnl CT/CXR R91.8  Asthma unspec J45.909  Hypoxemia R09.02  Pneumonia unspec J18.9  Resp fail acute J96.00    Additional COVID-19 diagnoses:  Concern of possible exposure to COVID-19, Now RULED OUT Z03.818    ASSESSMENT: 75-year-old female with a history of asthma and obstructive sleep apnea on CPAP, recent diagnosis of glioblastoma status post resection 2/23/21 admitted with progressive dyspnea. Admission CT Chest showed bilateral patchy groundglass opacities, negative for PE.  Initially treated with ceftriaxone and doxy. COVID19 negative 3/26 and 3/29.  Increased oxygen needs 3/29, ultimately  requiring BiPAP, with repeat CXR showing increase in bilateral patchy opacities.  Found to have DVT 3/29.  ID consulted 3/29, high suspicion for PJP pneumonia (LDH high, high beta d glucan), unable to confirm with the sputum studies as patient has been unable to provide any sputum.  PJP suspected secondary to prolonged steroid course following neurosurgery, switched from ceftriaxone to zosyn 3/29 and initiated on high dose Bactrim and increased steroids.  Follow-up CXRs 4/2 and 4/5 show persistent patchy infiltrates bilaterally. Chest x-rays 4/2 and 4/5 showed persistent patchy infiltrates bilaterally.  Recent hospital course notable for retroperitoneal bleed due to anticoagulation, and increased shortness of breath due to volume overload.  Follow-up chest x-rays 4/17 and 4/19 showed increased interstitial infiltrates.  CT scan of the chest 4/19 shows extensive bilateral pulmonary infiltrates which have increased when compared to the prior CT chest 3/26.  Decompensation likely reflects worsening PJP pneumonia versus superimposed acute lung injury.  Patient is not currently a candidate for bronchoscopy due to high FiO2 requirements and need for BiPAP. If diagnostic specimens required, she will either need to transfer to ICU for elective intubation and bronchoscopy by the Critical Care service vs OLBx by Thoracic Surgery.  SoluMedrol increased 4/20, seems somewhat better today. Would continue present rx for now.    PLAN:  1. Adjust oxygen, keep SaO2 > 90%  2. BiPAP prn.  3. Antibiotics - Bactrim  4. Steroids - SoluMedrol.  5. Diuresis - Lasix as ordered.  6. Follow CXR.    Dr. Bauer will be following the patient starting tomorrow.      Holden Jaime M.D.    [facility]  [phone]   (pager)  [phone]   (office)

## 2021-04-21 NOTE — PROGRESS NOTES
Service Date: 04/20/2021      SUBJECTIVE:  Ms. Bailey is a 75-year-old female with GBM.  She had gross total resection in 02/2021.  Concurrent chemoradiation was recommended.  That has not yet been started.      The patient is admitted with shortness of breath.  CT chest angiogram revealed bilateral ground-glass patchy opacity. COVID has been negative.  The patient is being treated for pneumonia, likely PJP pneumonia.  ID is following.      The patient was found to have bilateral lower extremity DVT.  She was on anticoagulation with Lovenox.  She developed retroperitoneal hematoma.  Anticoagulation was started.  IVC filter placed.      The patient continues to be very weak.  She continues to be short of breath.  She is on BiPAP.  She had a CT chest angiogram done yesterday. No pulmonary embolism.  There is bilateral extensive pulmonary infiltrate.  Pulmonary is seeing the patient.  Plan is for bronchoscopy.  This will be done in ICU setting.      I saw the patient.  She is very weak.  She is on BiPAP. Vital signs reviewed.  She is remaining afebrile.      The patient is very weak. Not in any severe pain.  She is short of breath.  Appetite is decreased.      PHYSICAL EXAMINATION:   GENERAL:  She is alert.  She is very weak.  She is on BiPAP.   VITAL SIGNS:  Reviewed.   Rest of review of systems not examined.      LABORATORY DATA:  Reviewed.      ASSESSMENT:   1.  75-year-old female with GBM.   2.  Bilateral pneumonia.   3.  Bilateral lower extremity deep venous thrombosis.  Anticoagulation on hold.  Status post IVC filter.   4.  Retroperitoneal hematoma from anticoagulation.   5.  Anemia secondary to blood loss, iron deficiency and anemia of chronic disease.     6.  Shock liver, improving.   7.  Thrombocytopenia secondary to acute illness, medications and shock liver.      PLAN:   1.  The patient has GBM.  The patient wants treatment for it.  Treatment with radiation and temozolomide will be started once her overall  condition improves.  The patient at this time is having multiple ongoing problems and is not a candidate to start treatment.     2.  CBC reviewed.  She is anemic due to multiple factors including iron deficiency. Will give her IV Venofer.        She is also thrombocytopenic.  This is due to her acute illness and also from shock liver.  I am not suspecting HIT.  Her platelets are better.  We will continue to monitor it.      3.  The patient has multiple ongoing problems including bilateral pneumonia and hypoxic respiratory failure.  Management as per hospitalist team, ID and pulmonologist.     4.  Oncology will follow intermittently.  Please call us with any questions.      Total time spent 25 minutes.         CODEY HDZ MD             D: 2021   T: 2021   MT: CARLOS      Name:     RADHA RYAN   MRN:      [id number]        Account:      [id number]   :      1945           Service Date: 2021      Document: [id number]

## 2021-04-21 NOTE — PROGRESS NOTES
CLINICAL NUTRITION SERVICES - REASSESSMENT NOTE      Future/Additional Recommendations:   If fluid restriction still preferred, would recommend concentrating TPN to meet estimated nutrition needs in 720 ml.  720 ml, 30 ml/hr, D25 AA9.7 to provide 1392 kcals (25 kcal/kg), 70 g Pro (1.3 g/kg), 180 gm CHO, and 36% of calories from fat.     If fluid restriction no longer needed, recommend increasing TPN to goal of 60 ml/hr as able.    Malnutrition:   % Weight Loss:  None noted  % Intake:  </= 50% for >/= 5 days (severe malnutrition)   Subcutaneous Fat Loss: None noted  Muscle Loss: Mild clavicle wasting, mild deltoid wasting, mild dorsal wasting.  Fluid Retention: +1 BLE      Malnutrition Diagnosis: Non Severe Malnutrition in the context of acute on chronic illness.     EVALUATION OF PROGRESS TOWARD GOALS   Diet:  NPO since 4/19    Nutrition Support:  TPN D15 AA5 @ 30 ml/hr + Lipids Daily = 1011 kcals (18 kcal/kg and 73% of needs), 36 g Pro(0.7 g/kg, 55% of needs), 108 gm CHO, 49% of calories from fat.     Intake/Tolerance:    -Wt: 73.5 kg, fluid overloaded  -Lowest recorded wt was 63.8 kg on 4/14  -BGM: 100-219  -TPN/Lipids held by provider on 4/19, Lipids turned off but TPN remained at 30 ml/hr     ASSESSED NUTRITION NEEDS:  Dosing Weight: 55.1 kg - adjusted from 63.8 kg  Estimated Energy Needs: 4957-0183 kcals (25-30 kcal/kg)  Estimated Protein Needs: 66-83 grams (1.2-1.5 g/kg)  Estimated Fluid needs per provider: 720 ml/hr per MD for today.    NEW FINDINGS:   -Pt having increased work of breathing since 4/19 likely due to fluid overload per MD.   4/19: Lipids held, TPN at 30 ml/hr instead of 60 ml/hr  4/19: Chest CT = No pulmonary embolism demonstrated. Extensive bilateral pulmonary infiltrates. These are increased from previous considerably.  4/21: Pt potentially getting bronchoscopy and may require intubation. No ICU beds available. No transfer available.   4/21: Per MD, if no bronch on 4/21, may be able to  increase TPN on 4/22 to 45-60 ml/hr    Previous Goals:   TPN to meet % of needs while oral intake inconsistent  Evaluation: Not met, pt only received 55% of energy needs and 54% of protein needs    Previous Nutrition Diagnosis:   Inadequate oral intake related to ileus, fatigue, SOB and slow diet advancement as evidenced by patient not eating while on BiPap and requiring TPN to meet estimated nutrition needs.  Evaluation: Declining    CURRENT NUTRITION DIAGNOSIS  Inadequate parenteral nutrition infusion related to TPN held due to respiratory decompensation as evidenced by pt only received 73% of energy needs and 55% of protein needs    INTERVENTIONS  Recommendations / Nutrition Prescription  If fluid restriction still preferred, would recommend concentrating TPN to meet estimated needs in 720 ml.  720 ml, 30 ml/hr, D25 AA9.7 to provide 1392 kcals (25 kcal/kg), 70 g Pro (1.3 g/kg), 180 gm CHO, and 36% of calories from fat.     If fluid restriction no longer needed, recommend increasing TPN to goal of 60 ml/hr as able.     Implementation  Collaboration and Referral of Nutrition care - discussed above with pharmD    Goals  TPN to meet % of needs within the next 48 hours.     MONITORING AND EVALUATION:  Progress towards goals will be monitored and evaluated per protocol and Practice Guidelines    Klaudia Drew  Dietetic Intern

## 2021-04-22 ENCOUNTER — APPOINTMENT (OUTPATIENT)
Dept: SPEECH THERAPY | Facility: CLINIC | Age: 76
DRG: 166 | End: 2021-04-22
Attending: HOSPITALIST
Payer: MEDICARE

## 2021-04-22 ENCOUNTER — APPOINTMENT (OUTPATIENT)
Dept: OCCUPATIONAL THERAPY | Facility: CLINIC | Age: 76
DRG: 166 | End: 2021-04-22
Attending: HOSPITALIST
Payer: MEDICARE

## 2021-04-22 ENCOUNTER — APPOINTMENT (OUTPATIENT)
Dept: PHYSICAL THERAPY | Facility: CLINIC | Age: 76
DRG: 166 | End: 2021-04-22
Attending: HOSPITALIST
Payer: MEDICARE

## 2021-04-22 LAB
ALBUMIN SERPL-MCNC: 2.4 G/DL (ref 3.4–5)
ALP SERPL-CCNC: 99 U/L (ref 40–150)
ALT SERPL W P-5'-P-CCNC: 250 U/L (ref 0–50)
ANION GAP SERPL CALCULATED.3IONS-SCNC: 5 MMOL/L (ref 3–14)
AST SERPL W P-5'-P-CCNC: 30 U/L (ref 0–45)
BASOPHILS # BLD AUTO: 0 10E9/L (ref 0–0.2)
BASOPHILS NFR BLD AUTO: 0 %
BILIRUB SERPL-MCNC: 0.5 MG/DL (ref 0.2–1.3)
BUN SERPL-MCNC: 24 MG/DL (ref 7–30)
CALCIUM SERPL-MCNC: 9.1 MG/DL (ref 8.5–10.1)
CHLORIDE SERPL-SCNC: 95 MMOL/L (ref 94–109)
CO2 SERPL-SCNC: 31 MMOL/L (ref 20–32)
CREAT SERPL-MCNC: 0.46 MG/DL (ref 0.52–1.04)
CRP SERPL-MCNC: 39.9 MG/L (ref 0–8)
DIFFERENTIAL METHOD BLD: ABNORMAL
EOSINOPHIL # BLD AUTO: 0 10E9/L (ref 0–0.7)
EOSINOPHIL NFR BLD AUTO: 0 %
ERYTHROCYTE [DISTWIDTH] IN BLOOD BY AUTOMATED COUNT: 16.8 % (ref 10–15)
GFR SERPL CREATININE-BSD FRML MDRD: >90 ML/MIN/{1.73_M2}
GLUCOSE BLDC GLUCOMTR-MCNC: 113 MG/DL (ref 70–99)
GLUCOSE BLDC GLUCOMTR-MCNC: 131 MG/DL (ref 70–99)
GLUCOSE BLDC GLUCOMTR-MCNC: 136 MG/DL (ref 70–99)
GLUCOSE BLDC GLUCOMTR-MCNC: 259 MG/DL (ref 70–99)
GLUCOSE SERPL-MCNC: 114 MG/DL (ref 70–99)
HCT VFR BLD AUTO: 28 % (ref 35–47)
HGB BLD-MCNC: 9.4 G/DL (ref 11.7–15.7)
LABORATORY COMMENT REPORT: NORMAL
LACTATE BLD-SCNC: 3 MMOL/L (ref 0.7–2)
LYMPHOCYTES # BLD AUTO: 0.3 10E9/L (ref 0.8–5.3)
LYMPHOCYTES NFR BLD AUTO: 2 %
MCH RBC QN AUTO: 28.7 PG (ref 26.5–33)
MCHC RBC AUTO-ENTMCNC: 33.6 G/DL (ref 31.5–36.5)
MCV RBC AUTO: 86 FL (ref 78–100)
METAMYELOCYTES # BLD: 0.3 10E9/L
METAMYELOCYTES NFR BLD MANUAL: 2 %
MONOCYTES # BLD AUTO: 0.3 10E9/L (ref 0–1.3)
MONOCYTES NFR BLD AUTO: 2 %
MYELOCYTES # BLD: 0.3 10E9/L
MYELOCYTES NFR BLD MANUAL: 2 %
NEUTROPHILS # BLD AUTO: 14.4 10E9/L (ref 1.6–8.3)
NEUTROPHILS NFR BLD AUTO: 92 %
PLATELET # BLD AUTO: 194 10E9/L (ref 150–450)
PLATELET # BLD EST: ABNORMAL 10*3/UL
POTASSIUM SERPL-SCNC: 3.7 MMOL/L (ref 3.4–5.3)
PROCALCITONIN SERPL-MCNC: 0.07 NG/ML
PROT SERPL-MCNC: 5.9 G/DL (ref 6.8–8.8)
RBC # BLD AUTO: 3.27 10E12/L (ref 3.8–5.2)
RBC MORPH BLD: ABNORMAL
SARS-COV-2 RNA RESP QL NAA+PROBE: NEGATIVE
SODIUM SERPL-SCNC: 131 MMOL/L (ref 133–144)
SPECIMEN SOURCE: NORMAL
WBC # BLD AUTO: 15.7 10E9/L (ref 4–11)

## 2021-04-22 PROCEDURE — 999N000157 HC STATISTIC RCP TIME EA 10 MIN

## 2021-04-22 PROCEDURE — 92507 TX SP LANG VOICE COMM INDIV: CPT | Mod: GN | Performed by: SPEECH-LANGUAGE PATHOLOGIST

## 2021-04-22 PROCEDURE — 87635 SARS-COV-2 COVID-19 AMP PRB: CPT | Performed by: HOSPITALIST

## 2021-04-22 PROCEDURE — 99232 SBSQ HOSP IP/OBS MODERATE 35: CPT | Performed by: INTERNAL MEDICINE

## 2021-04-22 PROCEDURE — 250N000011 HC RX IP 250 OP 636: Performed by: INTERNAL MEDICINE

## 2021-04-22 PROCEDURE — 999N000190 HC STATISTIC VAT ROUNDS

## 2021-04-22 PROCEDURE — 85025 COMPLETE CBC W/AUTO DIFF WBC: CPT | Performed by: HOSPITALIST

## 2021-04-22 PROCEDURE — 999N001017 HC STATISTIC GLUCOSE BY METER IP

## 2021-04-22 PROCEDURE — 80053 COMPREHEN METABOLIC PANEL: CPT | Performed by: HOSPITALIST

## 2021-04-22 PROCEDURE — 250N000011 HC RX IP 250 OP 636: Performed by: HOSPITALIST

## 2021-04-22 PROCEDURE — 120N000013 HC R&B IMCU

## 2021-04-22 PROCEDURE — 250N000013 HC RX MED GY IP 250 OP 250 PS 637: Performed by: HOSPITALIST

## 2021-04-22 PROCEDURE — 250N000013 HC RX MED GY IP 250 OP 250 PS 637: Performed by: INTERNAL MEDICINE

## 2021-04-22 PROCEDURE — 97110 THERAPEUTIC EXERCISES: CPT | Mod: GO | Performed by: OCCUPATIONAL THERAPIST

## 2021-04-22 PROCEDURE — 99233 SBSQ HOSP IP/OBS HIGH 50: CPT | Performed by: HOSPITALIST

## 2021-04-22 PROCEDURE — 258N000003 HC RX IP 258 OP 636: Performed by: HOSPITALIST

## 2021-04-22 PROCEDURE — C9113 INJ PANTOPRAZOLE SODIUM, VIA: HCPCS | Performed by: HOSPITALIST

## 2021-04-22 PROCEDURE — 84145 PROCALCITONIN (PCT): CPT | Performed by: HOSPITALIST

## 2021-04-22 PROCEDURE — 97110 THERAPEUTIC EXERCISES: CPT | Mod: GP

## 2021-04-22 PROCEDURE — 83605 ASSAY OF LACTIC ACID: CPT | Performed by: HOSPITALIST

## 2021-04-22 PROCEDURE — 250N000009 HC RX 250: Performed by: HOSPITALIST

## 2021-04-22 PROCEDURE — 86140 C-REACTIVE PROTEIN: CPT | Performed by: HOSPITALIST

## 2021-04-22 PROCEDURE — 92526 ORAL FUNCTION THERAPY: CPT | Mod: GN | Performed by: SPEECH-LANGUAGE PATHOLOGIST

## 2021-04-22 RX ORDER — POLYETHYLENE GLYCOL 3350 17 G/17G
17 POWDER, FOR SOLUTION ORAL DAILY
Status: DISCONTINUED | OUTPATIENT
Start: 2021-04-22 | End: 2021-04-26 | Stop reason: HOSPADM

## 2021-04-22 RX ORDER — POLYETHYLENE GLYCOL 3350 17 G/17G
17 POWDER, FOR SOLUTION ORAL DAILY PRN
Status: DISCONTINUED | OUTPATIENT
Start: 2021-04-22 | End: 2021-04-26 | Stop reason: HOSPADM

## 2021-04-22 RX ORDER — FUROSEMIDE 10 MG/ML
40 INJECTION INTRAMUSCULAR; INTRAVENOUS
Status: COMPLETED | OUTPATIENT
Start: 2021-04-22 | End: 2021-04-23

## 2021-04-22 RX ORDER — ACYCLOVIR 50 MG/G
OINTMENT TOPICAL
Status: DISCONTINUED | OUTPATIENT
Start: 2021-04-22 | End: 2021-04-26 | Stop reason: HOSPADM

## 2021-04-22 RX ORDER — FLUOXETINE 20 MG/5ML
80 SOLUTION ORAL DAILY
Status: DISCONTINUED | OUTPATIENT
Start: 2021-04-22 | End: 2021-04-26 | Stop reason: HOSPADM

## 2021-04-22 RX ADMIN — METOPROLOL TARTRATE 2.5 MG: 1 INJECTION, SOLUTION INTRAVENOUS at 21:37

## 2021-04-22 RX ADMIN — HYDROMORPHONE HYDROCHLORIDE 0.2 MG: 0.2 INJECTION, SOLUTION INTRAMUSCULAR; INTRAVENOUS; SUBCUTANEOUS at 02:05

## 2021-04-22 RX ADMIN — METOPROLOL TARTRATE 2.5 MG: 1 INJECTION, SOLUTION INTRAVENOUS at 08:58

## 2021-04-22 RX ADMIN — METOPROLOL TARTRATE 2.5 MG: 1 INJECTION, SOLUTION INTRAVENOUS at 14:35

## 2021-04-22 RX ADMIN — BUSPIRONE HYDROCHLORIDE 30 MG: 15 TABLET ORAL at 21:38

## 2021-04-22 RX ADMIN — METHYLPREDNISOLONE SODIUM SUCCINATE 62.5 MG: 125 INJECTION, POWDER, FOR SOLUTION INTRAMUSCULAR; INTRAVENOUS at 21:37

## 2021-04-22 RX ADMIN — METHYLPREDNISOLONE SODIUM SUCCINATE 62.5 MG: 125 INJECTION, POWDER, FOR SOLUTION INTRAMUSCULAR; INTRAVENOUS at 11:02

## 2021-04-22 RX ADMIN — SENNOSIDES AND DOCUSATE SODIUM 2 TABLET: 8.6; 5 TABLET ORAL at 21:37

## 2021-04-22 RX ADMIN — SULFAMETHOXAZOLE AND TRIMETHOPRIM 320 MG: 80; 16 INJECTION, SOLUTION, CONCENTRATE INTRAVENOUS at 21:40

## 2021-04-22 RX ADMIN — FUROSEMIDE 40 MG: 10 INJECTION, SOLUTION INTRAMUSCULAR; INTRAVENOUS at 08:52

## 2021-04-22 RX ADMIN — PANTOPRAZOLE SODIUM 40 MG: 40 INJECTION, POWDER, FOR SOLUTION INTRAVENOUS at 16:04

## 2021-04-22 RX ADMIN — PANTOPRAZOLE SODIUM 40 MG: 40 INJECTION, POWDER, FOR SOLUTION INTRAVENOUS at 08:49

## 2021-04-22 RX ADMIN — METHYLPREDNISOLONE SODIUM SUCCINATE 62.5 MG: 125 INJECTION, POWDER, FOR SOLUTION INTRAMUSCULAR; INTRAVENOUS at 03:37

## 2021-04-22 RX ADMIN — HYDROXYZINE HYDROCHLORIDE 50 MG: 25 TABLET, FILM COATED ORAL at 22:27

## 2021-04-22 RX ADMIN — SULFAMETHOXAZOLE AND TRIMETHOPRIM 320 MG: 80; 16 INJECTION, SOLUTION, CONCENTRATE INTRAVENOUS at 09:09

## 2021-04-22 RX ADMIN — ACYCLOVIR: 50 OINTMENT TOPICAL at 21:38

## 2021-04-22 RX ADMIN — I.V. FAT EMULSION 250 ML: 20 EMULSION INTRAVENOUS at 20:25

## 2021-04-22 RX ADMIN — POLYETHYLENE GLYCOL 3350 17 G: 17 POWDER, FOR SOLUTION ORAL at 14:32

## 2021-04-22 RX ADMIN — ACYCLOVIR: 50 OINTMENT TOPICAL at 18:23

## 2021-04-22 RX ADMIN — POTASSIUM CHLORIDE: 2 INJECTION, SOLUTION, CONCENTRATE INTRAVENOUS at 20:18

## 2021-04-22 RX ADMIN — LEVETIRACETAM 250 MG: 100 INJECTION, SOLUTION INTRAVENOUS at 21:40

## 2021-04-22 RX ADMIN — FUROSEMIDE 40 MG: 10 INJECTION, SOLUTION INTRAVENOUS at 16:03

## 2021-04-22 RX ADMIN — ONDANSETRON 4 MG: 2 INJECTION INTRAMUSCULAR; INTRAVENOUS at 11:17

## 2021-04-22 RX ADMIN — ENOXAPARIN SODIUM 40 MG: 40 INJECTION SUBCUTANEOUS at 16:04

## 2021-04-22 RX ADMIN — METOPROLOL TARTRATE 2.5 MG: 1 INJECTION, SOLUTION INTRAVENOUS at 03:37

## 2021-04-22 RX ADMIN — FLUOXETINE HYDROCHLORIDE 80 MG: 20 LIQUID ORAL at 10:52

## 2021-04-22 RX ADMIN — SENNOSIDES AND DOCUSATE SODIUM 2 TABLET: 8.6; 5 TABLET ORAL at 09:02

## 2021-04-22 RX ADMIN — LEVETIRACETAM 250 MG: 100 INJECTION, SOLUTION INTRAVENOUS at 08:43

## 2021-04-22 RX ADMIN — ACYCLOVIR: 50 OINTMENT TOPICAL at 16:03

## 2021-04-22 ASSESSMENT — ACTIVITIES OF DAILY LIVING (ADL)
ADLS_ACUITY_SCORE: 19
ADLS_ACUITY_SCORE: 25

## 2021-04-22 ASSESSMENT — MIFFLIN-ST. JEOR: SCORE: 1209.13

## 2021-04-22 NOTE — PROGRESS NOTES
I saw pt on  HH flow 60% FIO2,55L flow BBS: clear/Diminished. SPO2 91-93%.    I PLACED  PT ON A  BIPAP  14/7 R12 55% FIO2 for the night at 21:30. BBS: clear/Diminishedf. SPO2 98%.    I placed pt  Back on CPAP +5 per MD order. BBS: clear/Diminished SPO2  97%

## 2021-04-22 NOTE — PROGRESS NOTES
[facility]    Infectious Disease Progress Note    Date of Service (when I saw the patient): 04/22/2021     Assessment & Plan   Olga Bailey is a 75 year old female who was admitted on 3/26/2021.     Impression:  1. 75 y.o with recent diagnosis of craniotomy and resection of glioblastoma multiforme.   2. Has been on very high dose steroid taper for the past month PTA  since the craniotomy. Not on any bactrim prophylaxis before admission.    3. Admitted with shortness of breath.   4. Found to have bilateral ground glass infiltrates on the imaging, very hypoxic. Concern for PJP. Never able to give sputum for sample. COVID PCR negative x 3. LDH, Beta D glucan quite elevated.   5. Initial improvement on Bactrim and steroids, then a large spontaneous retroperitoneal bleed and now as the steroids were being tapered again hypoxic with progressive infiltrates.        Recommendations:   Last 3/ 4 days  more work of breathing initial suspicion was volume overload, but not improving with volume status management, ? due to steroid taper though with 3 weeks of PJP treatment this should not be the cause.   pulm reconsulted, notes reviewed. Already done with with 3 weeks of Bactrim for treatment for PJP but given concurrent steroid use still on Bactrim. Noted dose was increased again for the steroids.     On treatment with Bactrim for High suspicion for PJP pneumonia. LDH high, high beta d glucan unable to confirm with the sputum studies as patient has been unable to provide any sputum   Steroids for PJP continue to taper to be off steroids in next few days. Discussed with IM who also ran the course by neuro-onc.      Wbc up slightly most likely due to the increase in the steroids     Please continue to use topical acyclovir ointment on the lips for HSV lesion                 Per neuro onc last note her cancer related plan was as listed in the note below, I would recommend follow up with Neuro onc  "after discharge.    I am copying and pasting Neuro onc last note below:   \" PLAN  -CANCER-DIRECTED THERAPY-  Will initiate chemoradiotherapy with temozolomide 75mg/m2 (140mg).   -Instructed to start temozolomide the evening before the start date of radiation and continue daily until the completion of radiation.   -Take temozolomide on an empty stomach, 30 minutes after Zofran dosing.  -Additional temozolomide teaching performed by RN/ pharmacy staff.      -Initial labs ordered; CBC with differential, CMP, Hepatitis B serologies; NR.  -Will continue weekly CBC and repeat CMP at follow-up.     -Medications prescribed;        -Zofran 4mg (1 to 2 tabs qHS 30 minutes prior to chemotherapy and then PRN nausea).       -For lymphopenia, prophylactic antibiotics are recommended during concurrent treatment. Will start Sulfamethoxazole-trimethoprim (Bactrim) 800 mg-160 mg; 1 tab orally Monday, Wednesday, and Friday for pneumocystis prophylaxis. If thrombocytopenia becomes an issue, can change to Dapsone, Atovaquone, or Pentamidine.        -Docusate 100 mg; 1 cap orally 3 times a day (stool softener for constipation)       -Senna 8.6 m tabs orally at bedtime (laxative as needed for constipation)     -STEROIDS-  -Continue to wean dexamethasone as tolerated;                           4mg daily (today - 3/28)                          2mg daily (3/29 - )                          2mg on , , , then stop.   -Dose may increase while undergoing radiation.\"          Robyn Branham MD    Interval History    Continues to be hypoxic, slightly better with increase dose of steroids   No fever   Creat stable   WBC normalized       Physical Exam   Temp: 98.5  F (36.9  C) Temp src: Axillary BP: (!) 141/80 Pulse: 78   Resp: 27 SpO2: 95 % O2 Device: High Flow Nasal Cannula (HFNC) Oxygen Delivery: 60 LPM  Vitals:    21 0600 21 0600 21 0600   Weight: 74.3 kg (163 lb 12.8 oz) 77.8 kg (171 lb 8.3 oz) 73.5 kg (162 lb 0.6 " oz)     Vital Signs with Ranges  Temp:  [96  F (35.6  C)-99.6  F (37.6  C)] 98.5  F (36.9  C)  Pulse:  [68-92] 78  Resp:  [20-36] 27  BP: (120-159)/(72-84) 141/80  FiO2 (%):  [40 %-70 %] 40 %  SpO2:  [82 %-97 %] 95 %    Constitutional: on bipap  Lungs: diminished bilateral   Cardiovascular: Regular rate and rhythm, normal S1 and S2, and no murmur noted  Abdomen: Normal bowel sounds, soft, non-distended, non-tender  Skin: No rashes, no cyanosis, no edema  Other:    Medications     dextrose       - MEDICATION INSTRUCTIONS -       parenteral nutrition - Clinimix E 30 mL/hr at 04/21/21 0800       [Held by provider] amLODIPine  10 mg Oral Daily     [Held by provider] busPIRone HCl  30 mg Oral BID     FLUoxetine  80 mg Oral Daily     furosemide  40 mg Intravenous BID     insulin aspart  1-12 Units Subcutaneous Q4H     levETIRAcetam  250 mg Intravenous Q12H     [Held by provider] levETIRAcetam  250 mg Oral BID     lipids  250 mL Intravenous Q24H     methylPREDNISolone  62.5 mg Intravenous Q8H     metoprolol  2.5 mg Intravenous Q6H     [Held by provider] metoprolol tartrate  12.5 mg Oral BID     [Held by provider] pantoprazole  40 mg Oral BID AC     pantoprazole (PROTONIX) IV  40 mg Intravenous BID w/meals     senna-docusate  2 tablet Oral BID    Or     senna-docusate  2 tablet Oral BID     sodium chloride (PF)  3 mL Intracatheter Q8H     sulfamethoxazole-trimethoprim  320 mg Intravenous Q12H       Data   All microbiology laboratory data reviewed.  Recent Labs   Lab Test 04/22/21  0540 04/21/21  0630 04/20/21  0710   WBC 15.7* 11.6* 9.9   HGB 9.4* 8.9* 8.8*   HCT 28.0* 27.5* 26.7*   MCV 86 86 86    139* 102*     Recent Labs   Lab Test 04/22/21  0540 04/21/21  0630 04/20/21  0710   CR 0.46* 0.45* 0.40*     No lab results found.  Recent Labs   Lab Test 03/29/21 1951 03/29/21 1946   CULT No growth No growth       Attestation:  Total time on the floor involved in the patient's care: 35 minutes. Total time spent in  counseling/care coordination: >50%

## 2021-04-22 NOTE — PROGRESS NOTES
CLINICAL NUTRITION SERVICES    Current Nutrition Support  TPN D15 AA5 @ 30 ml/hr + Lipids Daily = 1011 kcals (18 kcal/kg and 73% of needs), 36 g Pro(0.7 g/kg, 55% of needs), 108 gm CHO, 49% of calories from fat. .     New Findings  -RD discussed with MD this am who approved TPN rate can increase to 45 ml/hr  -Diet advanced to Full Liquid NT with 1:1 assist, tolerating PO  -Pt worked with SLP yesterday, 240 ml PO yest  -Na: 131 (L) I/O: 1655/1950, improved respiratory status     Assessed Needs  Dosing Weight: 55.1 kg (adjusted)  Energy Needs:  5106-5691 kcals (25-30 Kcal/Kg) - overweight   Protein Needs:  67-84 grams protein (1.2-1.5 g pro/Kg) - hypercatabolism with acute illness     Interventions  Change TPN to D20 A6.7 to meet estimated needs and fluid restriction.  TPN D20 A6.7 to goal 45 mL/hr + Lipids 250 mL daily = 1080ml, 1522 kcals (27 kcal/kg), 72 gm pro (1.3 gm/kg), 216 gm CHO, 33% fat     Discontinue Magic Cup with meals  Order Chocolate Magic Shake with meals per Pt preference    Klaudia Drew  Dietetic Intern

## 2021-04-22 NOTE — PLAN OF CARE
IMC status. A&Ox3, disoriented to time. VSS. HFNC when awake, CPAP when asleep on home settings. O2 goal 90-92%. Assist of 1-2. Tolerating nectar thick full liquids. Lung sounds diminished, JACOBSEN. Bowel sounds active,. Adequate urine output, Purewick in place, skin intact. Abdomen bruised. R groin site soft, CDI. Mild discomfort, declined meds. Tele: .

## 2021-04-22 NOTE — PROVIDER NOTIFICATION
DATE:  4/22/2021   TIME OF RECEIPT FROM LAB:  8:28  LAB TEST: Lactic   LAB VALUE:  3.0  RESULTS Paged TO (PROVIDER):  Dr. Bernabe.   TIME LAB VALUE REPORTED TO PROVIDER:  Text page 8:30    Spoke with Dr. Bernabe. No new orders.

## 2021-04-22 NOTE — PROGRESS NOTES
"PULMONOLOGY PROGRESS NOTE    Date of Admission: 3/26/2021    CC/Reason for Hospital visit: Acute hypoxic respiratory failure, suspected PJP  SUBJECTIVE      Events of last night reviewed. On BiPAP overnight. Stable night. No new respiratory problems. Back on HFNC this AM. States breathing is slightly better today.    ROS: A Problem Pertinent review of systems was negative except for items noted in HPI.  Past Medical, Family, and Social/Substance History has been reviewed: No interval changes.    OBJECTIVE   Vital signs:  Temp: 98.5  F (36.9  C) Temp src: Axillary BP: (!) 141/80 Pulse: 78   Resp: 27 SpO2: 95 % O2 Device: High Flow Nasal Cannula (HFNC) Oxygen Delivery: 60 LPM Height: 160 cm (5' 3\") Weight: 73.5 kg (162 lb 0.6 oz)  Estimated body mass index is 28.7 kg/m  as calculated from the following:    Height as of this encounter: 1.6 m (5' 3\").    Weight as of this encounter: 73.5 kg (162 lb 0.6 oz).        I/O last 3 completed shifts:  In: 1655.47 [P.O.:240]  Out: 1950 [Urine:1950]      CONSTITUTIONAL/GENERAL APPEARANCE: Aler female. No Apparent Distress.  PSYCHIATRIC: Pleasant and appropriate mood and affect. Oriented x 3.  EARS, NOSE,THROAT,MOUTH: External ears and nose overall normal. Normal oral mucosa.   NECK: Neck appearance normal. No neck masses and the thyroid is not enlarged.   RESPIRATORY: Non-labored effort. Decreased BS, scattered crackles, no change.  CARDIOVASCULAR: S1, S2, regular rate and rhythm.    LABORATORY ASSESSMENT    Arterial Blood Gas  Recent Labs   Lab 04/19/21  1150   PH 7.51*   PCO2 38   PO2 65*   HCO3 30*   O2PER 60     CBC  Recent Labs   Lab 04/22/21  0540 04/21/21  0630 04/20/21  0710 04/19/21  0605   WBC 15.7* 11.6* 9.9 8.7   RBC 3.27* 3.19* 3.11* 2.91*   HGB 9.4* 8.9* 8.8* 8.5*   HCT 28.0* 27.5* 26.7* 25.3*   MCV 86 86 86 87   MCH 28.7 27.9 28.3 29.2   MCHC 33.6 32.4 33.0 33.6   RDW 16.8* 16.7* 17.1* 17.3*    139* 102* 82*     BMP  Recent Labs   Lab 04/22/21  0540 " 04/21/21  0630 04/20/21  0710 04/19/21  2350 04/19/21  0605   * 133 131*  --  132*   POTASSIUM 3.7 3.9 3.7 3.5 3.1*   CHLORIDE 95 96 96  --  97   ESTIVEN 9.1 9.2 8.7  --  8.1*   CO2 31 32 30  --  30   BUN 24 22 21  --  24   CR 0.46* 0.45* 0.40*  --  0.46*   * 113* 95  --  136*     INR  Recent Labs   Lab 04/16/21  0815   INR 1.18*      BNPNo lab results found in last 7 days.  VENOUS BLOOD GASESNo lab results found in last 7 days.    Additional labs and/or comments:      IMAGING      CT Chest 4/19 -  IMPRESSION:  1.  No pulmonary embolism demonstrated.  2.  Extensive bilateral pulmonary infiltrates. These are increased  from previous considerably.    CXR 4/19 -  IMPRESSION: Right PICC line tip in cavoatrial region. Shallow inspiration. Stable cardia mediastinal silhouette. Bilateral infiltrates.    CXR 4/17 -  IMPRESSION: Moderately extensive interstitial and airspace infiltrates  bilaterally similar to slightly worse than previous. Right-sided PICC  line tip in the low SVC.    PFT & OTHER TESTING     Echo 4/20 -  Interpretation Summary  Left ventricular systolic function is normal.The visual ejection fraction is  estimated at 55-60%.  The right ventricular systolic function is normal.  There is mild (1+) tricuspid regurgitation.    ASSESSMENT / PLAN      Pulmonary Diagnoses:  Abnl CT/CXR R91.8  Asthma unspec J45.909  Hypoxemia R09.02  Pneumonia unspec J18.9  Resp fail acute J96.00    Additional COVID-19 diagnoses:  Concern of possible exposure to COVID-19, Now RULED OUT Z03.818    ASSESSMENT: 75-year-old female with a history of asthma and obstructive sleep apnea on CPAP, recent diagnosis of glioblastoma status post resection 2/23/21 admitted with progressive dyspnea. Admission CT Chest showed bilateral patchy groundglass opacities, negative for PE.  Initially treated with ceftriaxone and doxy. COVID19 negative 3/26 and 3/29.  Increased oxygen needs 3/29, ultimately requiring BiPAP, with repeat CXR showing  increase in bilateral patchy opacities.  Found to have DVT 3/29.  ID consulted 3/29, high suspicion for PJP pneumonia (LDH high, high beta d glucan), unable to confirm with the sputum studies as patient has been unable to provide any sputum.  PJP suspected secondary to prolonged steroid course following neurosurgery, switched from ceftriaxone to zosyn 3/29 and initiated on high dose Bactrim and increased steroids.  Follow-up CXRs 4/2 and 4/5 show persistent patchy infiltrates bilaterally. Chest x-rays 4/2 and 4/5 showed persistent patchy infiltrates bilaterally.  Recent hospital course notable for retroperitoneal bleed due to anticoagulation, and increased shortness of breath due to volume overload.  Follow-up chest x-rays 4/17 and 4/19 showed increased interstitial infiltrates.  CT scan of the chest 4/19 shows extensive bilateral pulmonary infiltrates which have increased when compared to the prior CT chest 3/26.  Decompensation likely reflects worsening PJP pneumonia versus superimposed acute lung injury.  Patient is not currently a candidate for bronchoscopy due to high FiO2 requirements and need for BiPAP. If diagnostic specimens required, she will either need to transfer to ICU for elective intubation and bronchoscopy by the Critical Care service vs OLBx by Thoracic Surgery.  SoluMedrol increased 4/20, seems somewhat better today. Would continue present rx for now.    PLAN:  1. Adjust oxygen, keep SaO2 > 90%  2. BiPAP prn.  3. Antibiotics - Bactrim  4. Steroids - SoluMedrol.  5. Diuresis - Lasix as ordered.  6. Follow CXR.    Manuel Bauer M.D.  [facility]  [facility]  [phone]  Pager [phone]

## 2021-04-22 NOTE — PROGRESS NOTES
[facility]    Medicine Progress Note - Hospitalist Service       Date of Admission:  3/26/2021  Assessment & Plan       75 year old female admitted on 3/26/2021.  Past medical history that is most notable for recent craniotomy and resection of glioblastoma multiforme, as well as uncomplicated asthma, who presents with dyspnea and is found to have acute bilateral pneumonia.     Acute hypoxic respiratory failure due to bilateral pneumonia, suspected PCP  - discussed with ID, all consistent with PCP  - Bactrim DS 2 tid would've been completed 4/19 (21 of 21) however given increased O2 needs will continue BID bactrim - defer to ID whether broadened coverage to be considered  - 4/21 - 4/22 - continue increased steroids with solumedrol (per pulm)  - continue bipap as needed, HFNC in between as tolerated  - CT PE no clot, bilat infiltrates were seen  - Pulmonology also following  - 4/21 - 4/22 continues to have some stability/improvement and has been able to tolerate high flow nasal cannula during the daytime which has been allowing her to have oral meds and some p.o. intake.  Bactrim continued.  Solu-Medrol continued will consider slight decrease in dosing 4/23.  - Repeat Covid swab per hospital policy and to rule out iatrogenic infection  - Goal O2 sat 90% or more     Retroperitoneal bleed 2nd to Lovenox 4/14/2021 AM - resolving  Chronic anemia of chronic disease, with Acute blood loss  Baseline hgb 10-11 g/dL, stable around 8 - 9   - Hgb stable/improving today (hgb 9.4 <-- 8.9 <-- 8.8 <-- 8.5 <-- 8.1 <-- 8.6)                Increased SOB, suspect mild fluid overload (~4/17) -- increased wt 2nd to blood and IV fluids and TPN  - CXR 4/18 with no new infiltrates  - follow I/O and daily weights though they are difficult to trust given everything on bed etc  - IV lasix 40 bid continued through 4/22, will trial daily dosing starting 4/23     Non-severe malnutrition  Noted decrease oral intake and  drop in albumin, subcutaneous fat loss on exam.   - PICC line in place  - excellent oral intake, tapered off TPN 4/9, but restarted 4/18 as above  - 4/22 TPN to increase to 45/hr, continue PO intake noted. Appreciate dietician assistance, discussed plan.    Shock Liver with Elevated LFT's   - related to Retroperitoneal Bleed, resolving     Thrombocytopenia -- suspect consumption from bleeding  - follow CBC - stable/improved 4/20 - 102k --> 139k 4/21 --> 194k 4/22     Leukocytosis - likely stress and steroid related, afebrile                Retroperitoneal bleed 2nd to Lovenox 4/14/2021 AM  - Abd Pelvis CTA  without evidence of ongoing bleed  - Hgb stable/improving, no overt/acute abdominal changes; ongoing concern of ileus/constipation                Ileus 2nd to bleed -- persistent, probably 2nd to retroperitoneal bleed  - TPN as above, encourage PO intake as tolerated  - continue scheduled / prn bowel regimen as able     Bilateral Lower Leg DVT on 3/29, hypercoagulable 2nd to GBM  - off Lovenox (last dose 4/14 AM) -- IR placed IVC filter 4/16  - 4/22 will restart Lovenox 40 mg subcutaneous as hgb stable and she is at high risk of further VTE     Epistaxis  - now stable, off lovenox  - monitor     Hyponatremia  - mild, 2nd to SIADH from stress and pulm process  - stable, low 130s primarily     Steroid induced hyperglycemia -- resolved  - restart insulin as need     Urinary incontinence -- has pure wick     Hypokalemia, variable - replacement protocol     GBM s/p resection 2/23/21  Followed by Dr. Baker of Neuro-Oncology.  She is in process of being set up to start chemotherapy and XRT possibly this Week  - has not yet initiated chemo (temozolomide) and planned radiation  - Family agrees on 2nd opinion with [facility] concerning GBM treatment, [place] requested Dr. Stephanie Baker consult [facility] to initiate process (the Patient's Ex-, Dr. Renaldo Dow, is eager to hear what they recommend)  - will attempt  "to contact Dr Baker regarding any possible option for treatment, ex- insistent and hopeful that \"another system\" or oncologist may be willing to try some treatment sooner than when the current infection and respiratory problems are resolved  - keppra continued  ADDENDUM - 16:30 4/22 -spoke with Dr. Gamboa of oncology who was actually just seen Olga.  Discussed the overall case and improvement in the past day or 2, Dr. Gamboa will be reaching out to radiation oncology for potential coordination of radiation to start Monday possibly.  Greatly appreciate assistance.     Hypertension  - continue PTA amlodipine when taking PO (on bipap)     Sinus tachycardia -- clinically improved  - metoprolol 12.5 mg bid po or IV 2.5 q 6 given npo     Asthma  MARCELLE  - nebs prn  - resume CPAP with home setting 4/9     Depression and anxiety  Insomnia  - continue PTA Prozac and Buspar (can unhold orders if ok with speech eval)  - continue PTA Atarax for sleep  - prn lorazepam     GERD  - switch to PPI as above         Diet: Room Service  Advance Diet as Tolerated  Combination Diet Full Liquid Diet; Nectar Thickened Liquids (pre-thickened or use instant food thickener) (by spoon and/or straw pending work of breathing)  Snacks/Supplements Adult: Magic Cup; With Meals  parenteral nutrition - ADULT compounded formula  parenteral nutrition - Clinimix E    DVT Prophylaxis: ivc filter, lovenox to resume  Martino Catheter: not present  Code Status: Full Code           Disposition Plan   Expected discharge: several days, TCU  Entered: Duke Bernabe MD 04/22/2021, 3:38 PM       The patient's care was discussed with the Bedside Nurse, Patient and Patient's Family.    Duke Bernabe MD  Hospitalist Service  [facility]  Contact information available via [facility] Paging/Directory    ______________________________________________________________________    Interval History   Patient seen and examined early afternoon " with daughter at bedside.  Patient continues to tolerate high flow nasal cannula and we are continuing to attempt weaning O2.  She denies any new fevers, chills, or worsening shortness of breath.  Breathing is a little bit better she says.  She does have some generalized abdominal discomfort and mild distention.  Unsure when her last flatus was.  Hemoglobin and platelets improved.  Discussed plan at length with daughter, patient, and ex-.  We will attempt de-escalating diuretics and steroids 4/23.  Will resume Lovenox at low-dose.    Data reviewed today: I reviewed all medications, new labs and imaging results over the last 24 hours. I personally reviewed no images or EKG's today.    Physical Exam   Vital Signs: Temp: 97.6  F (36.4  C) Temp src: Oral BP: 126/66 Pulse: 77   Resp: 21 SpO2: 91 % O2 Device: High Flow Nasal Cannula (HFNC) Oxygen Delivery: 60 LPM  Weight: 162 lbs .61 oz    Gen: NAD, pleasant, HFNC in place  HEENT: Normocephalic, EOMI, MMM  Resp: no focal anterior crackles,  no wheezes  CV: S1S2 heard, reg rhythm, reg rate  Abdo: some mild tenderness on palpation through out, no rebound, somewhat full but not distended, BS present but diminished  Ext: calves nontender, well perfused  Neuro: AAOx3, CN grossly intact, no facial asymmetry      Data   Recent Labs   Lab 04/22/21  0540 04/21/21  0630 04/20/21  0710 04/16/21  0815 04/16/21  0815   WBC 15.7* 11.6* 9.9   < >  --    HGB 9.4* 8.9* 8.8*   < >  --    MCV 86 86 86   < >  --     139* 102*   < >  --    INR  --   --   --   --  1.18*   * 133 131*   < >  --    POTASSIUM 3.7 3.9 3.7   < >  --    CHLORIDE 95 96 96   < >  --    CO2 31 32 30   < >  --    BUN 24 22 21   < >  --    CR 0.46* 0.45* 0.40*   < >  --    ANIONGAP 5 5 5   < >  --    ESTIVEN 9.1 9.2 8.7   < >  --    * 113* 95   < >  --    ALBUMIN 2.4* 2.3* 2.3*   < >  --    PROTTOTAL 5.9* 6.0* 5.6*   < >  --    BILITOTAL 0.5 0.4 0.5   < >  --    ALKPHOS 99 95 83   < >  --    ALT  250* 325* 411*   < >  --    AST 30 44 68*   < >  --     < > = values in this interval not displayed.     No results found for this or any previous visit (from the past 24 hour(s)).

## 2021-04-22 NOTE — PLAN OF CARE
IMC: VSS except oxygen need on HFNC @ 25L, 55%.Tele SR. A/Ox 4, forgetful to time. Bruised throughout abdomen, unchanged. Denies overt pain but having some abdominal discomfort. Bowel sounds active but no BM, miralax given PRN. Turn/repo. Full liquid nectar thick diet, tolerated well. TPN @ 30. Voiding adequately, purewick in place. LS diminished. Covid swab obtained per protocol, negative. Lovenox restarted. CTM

## 2021-04-22 NOTE — PROGRESS NOTES
A&O x3 (disoriented to time). VSS on High Flow. Tele SR. CMS intact. Neuros intact. Lungs clear/Dim. Pt desats and has SOB with activity. BM -, flatus-. Groin Incision soft, no hematoma. Tolerating Full liquid (nectar) diet. + N/V. Voiding adequately. Denies having pain. Assist of 2 for repositioning.Pt had small amount of bleeding from her lip. Her lips appeared very dry. Applied moisturizer.     Pt unable to swallow prozac capsules. Talked with pharmacist and Prozac was changed from capsule to liquid. Pt reported nausea after the liquid Prozac. PRN Zofran effective. Pt said that she prefers the liquid Prozac.

## 2021-04-23 ENCOUNTER — APPOINTMENT (OUTPATIENT)
Dept: GENERAL RADIOLOGY | Facility: CLINIC | Age: 76
DRG: 166 | End: 2021-04-23
Attending: HOSPITALIST
Payer: MEDICARE

## 2021-04-23 ENCOUNTER — APPOINTMENT (OUTPATIENT)
Dept: CT IMAGING | Facility: CLINIC | Age: 76
DRG: 166 | End: 2021-04-23
Attending: HOSPITALIST
Payer: MEDICARE

## 2021-04-23 LAB
ANION GAP SERPL CALCULATED.3IONS-SCNC: 6 MMOL/L (ref 3–14)
BUN SERPL-MCNC: 26 MG/DL (ref 7–30)
CALCIUM SERPL-MCNC: 9.2 MG/DL (ref 8.5–10.1)
CHLORIDE SERPL-SCNC: 95 MMOL/L (ref 94–109)
CO2 SERPL-SCNC: 30 MMOL/L (ref 20–32)
CREAT SERPL-MCNC: 0.46 MG/DL (ref 0.52–1.04)
ERYTHROCYTE [DISTWIDTH] IN BLOOD BY AUTOMATED COUNT: 16.6 % (ref 10–15)
GFR SERPL CREATININE-BSD FRML MDRD: >90 ML/MIN/{1.73_M2}
GLUCOSE BLDC GLUCOMTR-MCNC: 140 MG/DL (ref 70–99)
GLUCOSE BLDC GLUCOMTR-MCNC: 152 MG/DL (ref 70–99)
GLUCOSE BLDC GLUCOMTR-MCNC: 160 MG/DL (ref 70–99)
GLUCOSE BLDC GLUCOMTR-MCNC: 177 MG/DL (ref 70–99)
GLUCOSE BLDC GLUCOMTR-MCNC: 178 MG/DL (ref 70–99)
GLUCOSE BLDC GLUCOMTR-MCNC: 250 MG/DL (ref 70–99)
GLUCOSE SERPL-MCNC: 154 MG/DL (ref 70–99)
HCT VFR BLD AUTO: 29.7 % (ref 35–47)
HGB BLD-MCNC: 9.8 G/DL (ref 11.7–15.7)
LACTATE BLD-SCNC: 2.4 MMOL/L (ref 0.7–2)
LACTATE BLD-SCNC: 2.7 MMOL/L (ref 0.7–2)
MAGNESIUM SERPL-MCNC: 2.1 MG/DL (ref 1.6–2.3)
MCH RBC QN AUTO: 28.7 PG (ref 26.5–33)
MCHC RBC AUTO-ENTMCNC: 33 G/DL (ref 31.5–36.5)
MCV RBC AUTO: 87 FL (ref 78–100)
PHOSPHATE SERPL-MCNC: 2.2 MG/DL (ref 2.5–4.5)
PLATELET # BLD AUTO: 222 10E9/L (ref 150–450)
POTASSIUM SERPL-SCNC: 3.5 MMOL/L (ref 3.4–5.3)
RBC # BLD AUTO: 3.41 10E12/L (ref 3.8–5.2)
SODIUM SERPL-SCNC: 131 MMOL/L (ref 133–144)
WBC # BLD AUTO: 13.3 10E9/L (ref 4–11)

## 2021-04-23 PROCEDURE — 120N000013 HC R&B IMCU

## 2021-04-23 PROCEDURE — 250N000011 HC RX IP 250 OP 636: Performed by: HOSPITALIST

## 2021-04-23 PROCEDURE — 84100 ASSAY OF PHOSPHORUS: CPT | Performed by: HOSPITALIST

## 2021-04-23 PROCEDURE — 250N000011 HC RX IP 250 OP 636: Performed by: INTERNAL MEDICINE

## 2021-04-23 PROCEDURE — 999N001017 HC STATISTIC GLUCOSE BY METER IP

## 2021-04-23 PROCEDURE — 85027 COMPLETE CBC AUTOMATED: CPT | Performed by: HOSPITALIST

## 2021-04-23 PROCEDURE — 250N000009 HC RX 250: Performed by: HOSPITALIST

## 2021-04-23 PROCEDURE — 83735 ASSAY OF MAGNESIUM: CPT | Performed by: HOSPITALIST

## 2021-04-23 PROCEDURE — 83605 ASSAY OF LACTIC ACID: CPT | Performed by: HOSPITALIST

## 2021-04-23 PROCEDURE — 999N000185 HC STATISTIC TRANSPORT TIME EA 15 MIN

## 2021-04-23 PROCEDURE — 999N000157 HC STATISTIC RCP TIME EA 10 MIN

## 2021-04-23 PROCEDURE — 99233 SBSQ HOSP IP/OBS HIGH 50: CPT | Performed by: HOSPITALIST

## 2021-04-23 PROCEDURE — 999N000190 HC STATISTIC VAT ROUNDS

## 2021-04-23 PROCEDURE — 74018 RADEX ABDOMEN 1 VIEW: CPT

## 2021-04-23 PROCEDURE — C9113 INJ PANTOPRAZOLE SODIUM, VIA: HCPCS | Performed by: HOSPITALIST

## 2021-04-23 PROCEDURE — 94660 CPAP INITIATION&MGMT: CPT

## 2021-04-23 PROCEDURE — 250N000013 HC RX MED GY IP 250 OP 250 PS 637: Performed by: HOSPITALIST

## 2021-04-23 PROCEDURE — 258N000003 HC RX IP 258 OP 636: Performed by: HOSPITALIST

## 2021-04-23 PROCEDURE — 250N000013 HC RX MED GY IP 250 OP 250 PS 637: Performed by: INTERNAL MEDICINE

## 2021-04-23 PROCEDURE — 80048 BASIC METABOLIC PNL TOTAL CA: CPT | Performed by: HOSPITALIST

## 2021-04-23 PROCEDURE — 83605 ASSAY OF LACTIC ACID: CPT | Performed by: INTERNAL MEDICINE

## 2021-04-23 PROCEDURE — 74177 CT ABD & PELVIS W/CONTRAST: CPT | Mod: ME

## 2021-04-23 RX ORDER — FUROSEMIDE 10 MG/ML
40 INJECTION INTRAMUSCULAR; INTRAVENOUS ONCE
Status: DISCONTINUED | OUTPATIENT
Start: 2021-04-24 | End: 2021-04-26 | Stop reason: HOSPADM

## 2021-04-23 RX ORDER — SODIUM CHLORIDE 9 MG/ML
INJECTION, SOLUTION INTRAVENOUS CONTINUOUS
Status: ACTIVE | OUTPATIENT
Start: 2021-04-23 | End: 2021-04-23

## 2021-04-23 RX ORDER — IOPAMIDOL 755 MG/ML
76 INJECTION, SOLUTION INTRAVASCULAR ONCE
Status: COMPLETED | OUTPATIENT
Start: 2021-04-23 | End: 2021-04-23

## 2021-04-23 RX ORDER — METHYLPREDNISOLONE SODIUM SUCCINATE 125 MG/2ML
60 INJECTION, POWDER, LYOPHILIZED, FOR SOLUTION INTRAMUSCULAR; INTRAVENOUS EVERY 12 HOURS
Status: DISCONTINUED | OUTPATIENT
Start: 2021-04-23 | End: 2021-04-26 | Stop reason: HOSPADM

## 2021-04-23 RX ADMIN — ACYCLOVIR: 50 OINTMENT TOPICAL at 12:09

## 2021-04-23 RX ADMIN — ACETAMINOPHEN 650 MG: 325 TABLET, FILM COATED ORAL at 17:06

## 2021-04-23 RX ADMIN — HYDROXYZINE HYDROCHLORIDE 50 MG: 25 TABLET, FILM COATED ORAL at 21:10

## 2021-04-23 RX ADMIN — PANTOPRAZOLE SODIUM 40 MG: 40 INJECTION, POWDER, FOR SOLUTION INTRAVENOUS at 17:06

## 2021-04-23 RX ADMIN — FUROSEMIDE 40 MG: 10 INJECTION, SOLUTION INTRAVENOUS at 09:20

## 2021-04-23 RX ADMIN — ACYCLOVIR: 50 OINTMENT TOPICAL at 06:06

## 2021-04-23 RX ADMIN — ACYCLOVIR: 50 OINTMENT TOPICAL at 18:04

## 2021-04-23 RX ADMIN — ACYCLOVIR: 50 OINTMENT TOPICAL at 00:32

## 2021-04-23 RX ADMIN — HYDROMORPHONE HYDROCHLORIDE 0.2 MG: 0.2 INJECTION, SOLUTION INTRAMUSCULAR; INTRAVENOUS; SUBCUTANEOUS at 12:49

## 2021-04-23 RX ADMIN — PANTOPRAZOLE SODIUM 40 MG: 40 INJECTION, POWDER, FOR SOLUTION INTRAVENOUS at 09:20

## 2021-04-23 RX ADMIN — SODIUM CHLORIDE: 9 INJECTION, SOLUTION INTRAVENOUS at 18:14

## 2021-04-23 RX ADMIN — POLYETHYLENE GLYCOL 3350 17 G: 17 POWDER, FOR SOLUTION ORAL at 12:55

## 2021-04-23 RX ADMIN — SODIUM PHOSPHATE 1 ENEMA: 7; 19 ENEMA RECTAL at 09:06

## 2021-04-23 RX ADMIN — ACYCLOVIR: 50 OINTMENT TOPICAL at 03:06

## 2021-04-23 RX ADMIN — ACYCLOVIR: 50 OINTMENT TOPICAL at 21:15

## 2021-04-23 RX ADMIN — ENOXAPARIN SODIUM 40 MG: 40 INJECTION SUBCUTANEOUS at 15:50

## 2021-04-23 RX ADMIN — FLUOXETINE HYDROCHLORIDE 80 MG: 20 LIQUID ORAL at 12:55

## 2021-04-23 RX ADMIN — I.V. FAT EMULSION 250 ML: 20 EMULSION INTRAVENOUS at 20:53

## 2021-04-23 RX ADMIN — IOPAMIDOL 76 ML: 755 INJECTION, SOLUTION INTRAVENOUS at 18:46

## 2021-04-23 RX ADMIN — SENNOSIDES AND DOCUSATE SODIUM 2 TABLET: 8.6; 5 TABLET ORAL at 12:52

## 2021-04-23 RX ADMIN — METHYLPREDNISOLONE SODIUM SUCCINATE 62.5 MG: 125 INJECTION, POWDER, FOR SOLUTION INTRAMUSCULAR; INTRAVENOUS at 04:16

## 2021-04-23 RX ADMIN — ACETAMINOPHEN 650 MG: 325 TABLET, FILM COATED ORAL at 22:42

## 2021-04-23 RX ADMIN — LEVETIRACETAM 250 MG: 100 INJECTION, SOLUTION INTRAVENOUS at 09:32

## 2021-04-23 RX ADMIN — LEVETIRACETAM 250 MG: 100 INJECTION, SOLUTION INTRAVENOUS at 21:10

## 2021-04-23 RX ADMIN — POTASSIUM CHLORIDE: 2 INJECTION, SOLUTION, CONCENTRATE INTRAVENOUS at 20:52

## 2021-04-23 RX ADMIN — SULFAMETHOXAZOLE AND TRIMETHOPRIM 320 MG: 80; 16 INJECTION, SOLUTION, CONCENTRATE INTRAVENOUS at 22:46

## 2021-04-23 RX ADMIN — METOPROLOL TARTRATE 2.5 MG: 1 INJECTION, SOLUTION INTRAVENOUS at 20:53

## 2021-04-23 RX ADMIN — SODIUM CHLORIDE 62 ML: 9 INJECTION, SOLUTION INTRAVENOUS at 18:46

## 2021-04-23 RX ADMIN — SENNOSIDES AND DOCUSATE SODIUM 2 TABLET: 8.6; 5 TABLET ORAL at 20:55

## 2021-04-23 RX ADMIN — BISACODYL 10 MG: 10 SUPPOSITORY RECTAL at 06:09

## 2021-04-23 RX ADMIN — METOPROLOL TARTRATE 2.5 MG: 1 INJECTION, SOLUTION INTRAVENOUS at 02:52

## 2021-04-23 RX ADMIN — BUSPIRONE HYDROCHLORIDE 30 MG: 15 TABLET ORAL at 12:52

## 2021-04-23 RX ADMIN — METOPROLOL TARTRATE 2.5 MG: 1 INJECTION, SOLUTION INTRAVENOUS at 09:20

## 2021-04-23 RX ADMIN — METOPROLOL TARTRATE 2.5 MG: 1 INJECTION, SOLUTION INTRAVENOUS at 14:39

## 2021-04-23 RX ADMIN — ACYCLOVIR: 50 OINTMENT TOPICAL at 09:27

## 2021-04-23 RX ADMIN — HYDROMORPHONE HYDROCHLORIDE 0.2 MG: 0.2 INJECTION, SOLUTION INTRAMUSCULAR; INTRAVENOUS; SUBCUTANEOUS at 09:18

## 2021-04-23 RX ADMIN — ACYCLOVIR: 50 OINTMENT TOPICAL at 15:49

## 2021-04-23 RX ADMIN — SULFAMETHOXAZOLE AND TRIMETHOPRIM 320 MG: 80; 16 INJECTION, SOLUTION, CONCENTRATE INTRAVENOUS at 09:19

## 2021-04-23 RX ADMIN — BUSPIRONE HYDROCHLORIDE 30 MG: 15 TABLET ORAL at 20:55

## 2021-04-23 RX ADMIN — METHYLPREDNISOLONE SODIUM SUCCINATE 62.5 MG: 125 INJECTION, POWDER, FOR SOLUTION INTRAMUSCULAR; INTRAVENOUS at 15:49

## 2021-04-23 ASSESSMENT — ACTIVITIES OF DAILY LIVING (ADL)
ADLS_ACUITY_SCORE: 27
ADLS_ACUITY_SCORE: 27
ADLS_ACUITY_SCORE: 20
ADLS_ACUITY_SCORE: 22
ADLS_ACUITY_SCORE: 22
ADLS_ACUITY_SCORE: 27

## 2021-04-23 ASSESSMENT — MIFFLIN-ST. JEOR: SCORE: 1157.13

## 2021-04-23 NOTE — PROGRESS NOTES
[facility]    Infectious Disease Progress Note    Date of Service (when I saw the patient): 04/23/2021     Assessment & Plan   Olga Bailey is a 75 year old female who was admitted on 3/26/2021.     Impression:  1. 75 y.o with recent diagnosis of craniotomy and resection of glioblastoma multiforme.   2. Has been on very high dose steroid taper for the past month PTA  since the craniotomy. Not on any bactrim prophylaxis before admission.    3. Admitted with shortness of breath.   4. Found to have bilateral ground glass infiltrates on the imaging, very hypoxic. Concern for PJP. Never able to give sputum for sample. COVID PCR negative x 3. LDH, Beta D glucan quite elevated.   5. Initial improvement on Bactrim and steroids, then a large spontaneous retroperitoneal bleed and now as the steroids were being tapered again hypoxic with progressive infiltrates.   6. This week gradual improvement in symptoms on increased steroid dose.        Recommendations:   At the start of this week and during last weekend, she had increase in the work of breathing. Initial suspicion was volume overload, but not improving with volume status management, ? due to steroid taper though with 3 weeks of PJP treatment this should not be the cause. This has been better starting 4/ 20 with increase in the steroid dose per pulm recommendations.     Pulm was reconsulted, notes reviewed. Already done with with 3 weeks of Bactrim for treatment for PJP but given concurrent steroid use still on Bactrim at BID dosing. Day 25 of Bactrim My plan is to reduce dose to prophylactic dose some time next week, hopefully we can start tapering the steroids as well before  that time, I would defer steroid taper to pulmonary.      Wbc up slightly most likely due to the increase in the steroids     Please continue to use topical acyclovir ointment on the lips for HSV lesion                 Per neuro onc last note her cancer related plan  "was as listed in the note below, I would recommend follow up with Neuro onc after discharge.    I am copying and pasting Neuro onc last note below:   \" PLAN  -CANCER-DIRECTED THERAPY-  Will initiate chemoradiotherapy with temozolomide 75mg/m2 (140mg).   -Instructed to start temozolomide the evening before the start date of radiation and continue daily until the completion of radiation.   -Take temozolomide on an empty stomach, 30 minutes after Zofran dosing.  -Additional temozolomide teaching performed by RN/ pharmacy staff.      -Initial labs ordered; CBC with differential, CMP, Hepatitis B serologies; NR.  -Will continue weekly CBC and repeat CMP at follow-up.     -Medications prescribed;        -Zofran 4mg (1 to 2 tabs qHS 30 minutes prior to chemotherapy and then PRN nausea).       -For lymphopenia, prophylactic antibiotics are recommended during concurrent treatment. Will start Sulfamethoxazole-trimethoprim (Bactrim) 800 mg-160 mg; 1 tab orally Monday, Wednesday, and Friday for pneumocystis prophylaxis. If thrombocytopenia becomes an issue, can change to Dapsone, Atovaquone, or Pentamidine.        -Docusate 100 mg; 1 cap orally 3 times a day (stool softener for constipation)       -Senna 8.6 m tabs orally at bedtime (laxative as needed for constipation)     -STEROIDS-  -Continue to wean dexamethasone as tolerated;                           4mg daily ( - 3/28)                          2mg daily (3/29 - )                          2mg on , , , then stop.   -Dose may increase while undergoing radiation.\"          Robyn Branham MD    Interval History    On 35 L with HFNC better with increase dose of steroids   Abdominal pain feels constipated   No fever   Creat stable   WBC stable        Physical Exam   Temp: 97.7  F (36.5  C) Temp src: Oral BP: 139/86 Pulse: 88   Resp: 25 SpO2: 92 % O2 Device: High Flow Nasal Cannula (HFNC) Oxygen Delivery: 35 LPM  Vitals:    21 0600 21 1545 21 " 0600   Weight: 73.5 kg (162 lb 0.6 oz) 74.5 kg (164 lb 3.9 oz) 69.3 kg (152 lb 12.5 oz)     Vital Signs with Ranges  Temp:  [97.4  F (36.3  C)-98.2  F (36.8  C)] 97.7  F (36.5  C)  Pulse:  [74-98] 88  Resp:  [20-38] 25  BP: (126-154)/() 139/86  FiO2 (%):  [35 %-60 %] 50 %  SpO2:  [87 %-98 %] 92 %    Constitutional: on bipap  Lungs: diminished bilateral   Cardiovascular: Regular rate and rhythm, normal S1 and S2, and no murmur noted  Abdomen: distended   Skin: No rashes, no cyanosis, no edema  Other:    Medications     dextrose       - MEDICATION INSTRUCTIONS -       parenteral nutrition - ADULT compounded formula       parenteral nutrition - ADULT compounded formula 45 mL/hr at 04/22/21 2018       acyclovir   Topical Q3H     [Held by provider] amLODIPine  10 mg Oral Daily     busPIRone HCl  30 mg Oral BID     enoxaparin ANTICOAGULANT  40 mg Subcutaneous Q24H     FLUoxetine  80 mg Oral Daily     [START ON 4/24/2021] furosemide  40 mg Intravenous Once     insulin aspart  1-12 Units Subcutaneous Q4H     levETIRAcetam  250 mg Intravenous Q12H     [Held by provider] levETIRAcetam  250 mg Oral BID     lipids  250 mL Intravenous Q24H     methylPREDNISolone  62.5 mg Intravenous Q12H     metoprolol  2.5 mg Intravenous Q6H     [Held by provider] metoprolol tartrate  12.5 mg Oral BID     [Held by provider] pantoprazole  40 mg Oral BID AC     pantoprazole (PROTONIX) IV  40 mg Intravenous BID w/meals     polyethylene glycol  17 g Oral Daily     senna-docusate  2 tablet Oral BID    Or     senna-docusate  2 tablet Oral BID     sodium chloride (PF)  3 mL Intracatheter Q8H     sulfamethoxazole-trimethoprim  320 mg Intravenous Q12H       Data   All microbiology laboratory data reviewed.  Recent Labs   Lab Test 04/23/21  0550 04/22/21  0540 04/21/21  0630   WBC 13.3* 15.7* 11.6*   HGB 9.8* 9.4* 8.9*   HCT 29.7* 28.0* 27.5*   MCV 87 86 86    194 139*     Recent Labs   Lab Test 04/23/21  0550 04/22/21  0540 04/21/21  0630    CR 0.46* 0.46* 0.45*     No lab results found.  Recent Labs   Lab Test 03/29/21 1951 03/29/21  1946   CULT No growth No growth       Attestation:  Total time on the floor involved in the patient's care: 35 minutes. Total time spent in counseling/care coordination: >50%

## 2021-04-23 NOTE — PROGRESS NOTES
Service Date: 04/22/2021    SUBJECTIVE:  Ms. Bailey is a 75-year-old female with left frontoparietal GBM.  The patient had gross total resection on 02/23/2021.  Plan is for adjuvant concurrent chemoradiation.  She will get temozolomide with radiation.    The patient admitted with pneumonia.  She was brought to the Emergency Room on 03/26/2021 with shortness of breath and leg swelling.  CT chest angiogram did not reveal any pulmonary embolism.  It revealed patchy ground glass opacities throughout both the lungs. COVID-19 has been negative.    The patient is being treated for pneumonia including PJP.  ID is following.    The patient's shortness of breath had deteriorated.  She is on multiple different treatment including IV Solu-Medrol.  Pulmonary is following.  Her shortness of breath is improving.  She was on BiPAP.  Now, she is on high flow nasal cannula.    The patient also has DVT.  Ultrasound on 03/29/2021 revealed bilateral lower extremity DVT.  She was started on Lovenox.  She developed retroperitoneal hematoma.  IVC filter was placed.  Now, she is back on prophylactic enoxaparin.    The patient's shortness of breath is improving.  She is not in any severe pain.  She is not having fever.    PHYSICAL EXAMINATION:      GENERAL:  She was alert.     VITAL SIGNS:  Reviewed.  Rest of the systems not examined.    LABORATORY DATA:  Reviewed.    ASSESSMENT:     1.  A 75-year-old female with glioblastoma multiforme.   2.  Bilateral lower extremity deep venous thrombosis.  3.  Bilateral pneumonia.  4.  Respiratory failure, improving.  5.  Retroperitoneal bleed secondary to anticoagulation.    6.  Leukocytosis from steroid.    7.  Anemia, stable.    PLAN:    1.  The patient has multiple ongoing problems, her overall condition is improving.      2.  The patient has a GBM.  The patient needs treatment with radiation and temozolomide.We will consult our radiation oncologist regarding starting radiation.  When she starts  radiation, temozolomide will be added.    3.  Case discussed with Dr. Duke oLpez.  Oncology will continue to follow intermittently.    Total time spent 25 minutes.    Rosenda Gamboa MD        D: 2021   T: 2021   MT: [id number]    Name:     RADHA RYAN  MRN:      [id number]        Account:      [id number]   :      1945           Service Date: 2021       Document: [id number]

## 2021-04-23 NOTE — PLAN OF CARE
Oxygen need decreasing, currently on HFNC at 40% 35 liters, sating low 90's. Abdomen distended, bowel tone hypoactive, small BM results after a suppository and fleets enema. Pt received small dose of IV dilaudid for abdominal pain. Skin intact except for lip ulceration, PUP measures maintained. Appetite poor, pt requires supervision with meals.

## 2021-04-23 NOTE — PLAN OF CARE
IMC status continued. Alert and oriented x3, disoriented to time. Vital signs stable on HFNC, wore CPAP at 35% over night to maintain O2 sats between 90-92% per orders. Tele NSR. Denies pain. Right triple lumen PICC, TPN infusing at 45 ml/hr with lipids. Lung sounds diminished. Purewick in place due to incontinence, voiding. Bowel sounds active, denies flatus, no BM. PRN suppository given. Abdomen distended with bruising noted. Denies N/V. Turn/repo q2h. Up with lift, Ax2.  Full liquids nectar thickened. BG checks q4h- insulin coverage needed.

## 2021-04-23 NOTE — PLAN OF CARE
[id number]: Alert and oriented x4. VSS. Pain rated 7/10, tylenol given. Small liquid stool this shift. Incontinent of bowel and bladder, purwik in place. Blood glucose 140. Up with lift. Turn and repo q2. Tolerating diet. TPN running. Plan to continue to monitor tonight, to Have CT scan of abdomen, start NS at 75/hr right before CT and run for 6 hours per MD to stop fluids immediately if looking fluid overloaded.

## 2021-04-23 NOTE — PLAN OF CARE
SLP: Per discussion with RN, patient not appropriate for session this morning due to medical status.

## 2021-04-23 NOTE — PROGRESS NOTES
Pt on high flow nasal cannula. Spo2 at 92% 55% Fio2 25LPM. Breath sounds diminished. CPAP Will be added after meds are given. Will continue to follow.  4/22/2021  Ewa Lamb

## 2021-04-23 NOTE — CONSULTS
76 y/o female with Dx. Of left frontoparietal brain glioblastoma tumor, S/P resection 2/23/21.  Pt was seen by Dr. Trujillo radiation oncologist at [facility] on 3/24/21 and XRT & chemorx. was recommended.  Pt subsequently hospitalized 3/26/21 here at [facility].  Pt being treated for pneumonia & DVT.    I met with pt. & reviewed imaging.  I discussed with Dr. Gamboa oncology & Dr. Bernabe hospitalist.. Pt may be candidate for XRT rx. once stronger.  May need updated brain MRI to assess status of brain tumor postop. Pt may require hospital based RT Dept if she is transferred to TCU & XRT treatment desired.  Dr. Bernabe will discuss with Dr. Gamboa.  Timing of treatment of brain tumor will depend on pt's status. Will follow. KATT Mcdonald MD.

## 2021-04-23 NOTE — PROGRESS NOTES
[facility]    Medicine Progress Note - Hospitalist Service       Date of Admission:  3/26/2021  Assessment & Plan       75 year old female admitted on 3/26/2021.  Past medical history that is most notable for recent craniotomy and resection of glioblastoma multiforme, as well as uncomplicated asthma, who presents with dyspnea and is found to have acute bilateral pneumonia.     Acute hypoxic respiratory failure due to bilateral pneumonia, suspected PCP  - discussed with ID, all consistent with PCP  - Bactrim DS 2 tid would've been completed 4/19 (21 of 21) however given increased O2 needs will continue BID bactrim - defer to ID whether broadened coverage to be considered  - 4/21 - 4/22 - continue increased steroids with solumedrol (per pulm)  - continue bipap as needed, HFNC in between as tolerated  - CT PE no clot, bilat infiltrates were seen  - Pulmonology also following  - 4/21 - 4/22 continues to have some stability/improvement and has been able to tolerate high flow nasal cannula during the daytime which has been allowing her to have oral meds and some p.o. intake.  Bactrim continued ID plans appreciated  - possible upcoming decrease.    - 4/23 solumedrol 60 q 12h attempted - discussed with Pulmonology  - Repeat Covid 4/22 - NEGATIVE  - Goal O2 sat 90% or more     Retroperitoneal bleed 2nd to Lovenox 4/14/2021 AM - resolving  Chronic anemia of chronic disease, with Acute blood loss  Baseline hgb 10-11 g/dL, stable around 8 - 9   - Hgb stable/improving today (hgb 9.8 <--  9.4 <-- 8.9 <-- 8.8 <-- 8.5 <-- 8.1 <-- 8.6)                Increased SOB, suspect mild fluid overload (~4/17) -- increased wt 2nd to blood and IV fluids and TPN  - CXR 4/18 with no new infiltrates  - follow I/O and daily weights though they are difficult to trust given everything on bed etc  - IV lasix 40 bid continued through 4/22  - IV lasix 40 daily starting 4/23 - weight is down in 150s      GBM s/p resection  2/23/21  Followed by Dr. Baker of Neuro-Oncology.  She is in process of being set up to start chemotherapy and XRT possibly this Week  - has not yet initiated chemo (temozolomide) and planned radiation  - Family agrees on 2nd opinion with [facility] concerning GBM treatment, [place] requested Dr. Stephanie Baker consult [place] [facility] to initiate process (the Patient's Ex-, Nemesio Renaldo Dow, is eager to hear what they recommend)  - will attempt to contact Dr. Baker regarding any possible option for treatment, ex-  - TRANSFER PLANS -  - keppra continued - defer this regimen, but previously plan was for 1 week bid starting 4/19 - would greatly appreciate Neuro-Onc update on regimen going forward  -4/23-after speaking with Dr. Gamboa from oncology and Dr. Mcdonald from radiation oncology, I was able to speak with Dr. Stephanie Baker regarding the overall plan.  Given her stability/slight improvement for the last couple days from a respiratory standpoint it would be ideal to expedite any possible neck steps with treatment for her GBM.  I spoke with patient placement at the  regarding transfer there in order to update brain MR imaging and possible radiation treatments.  I spoke with Dr Fair of oncology from the [facility].  Per patient placement, they have over 20 pending admission/transfers in addition to ER admissions and will not even be able to put the patient on a waiting list.  I expressed my dissatisfaction with this and reiterated that this patient is already had a complicated course with prolonged hospitalization.  I was directed to call back tomorrow and see if the bed availability situation is improved.  My concern is that it will not have improved and therefore we will lose more days which is valuable time for the patient to have begun treatments.  Greatly appreciate assistance and coordination from Dr. Baker who will be speaking with head of oncology to assist in facilitating appropriate transfer.  Ideally  patient could be transferred on or before the afternoon of April 25 with likely brain MRI Monday, April 26.  This was relayed to the patient's daughter.    Ileus 2nd to bleed -- persistent, probably 2nd to retroperitoneal bleed  - TPN as above, encourage PO intake as tolerated  - continue scheduled / prn bowel regimen as able - suppos/enema if necessary  - 4/23 abdo plain film showed: Bowel gas pattern is nonobstructed. Moderate amount of  stool in the colon. IVC filter present.    Retroperitoneal bleed 2nd to Lovenox 4/14/2021 AM  - Abd Pelvis CTA  without evidence of ongoing bleed  - Hgb stable/improving, no overt/acute abdominal changes; ongoing concern of ileus/constipation.     Bilateral Lower Leg DVT on 3/29, hypercoagulable 2nd to GBM  - off Lovenox (last dose 4/14 AM) -- IR placed IVC filter 4/16  - 4/22 will restart Lovenox 40 mg subcutaneous as hgb stable and she is at high risk of further VTE    Non-severe malnutrition  Noted decrease oral intake and drop in albumin, subcutaneous fat loss on exam.   - PICC line in place  - previously with excellent oral intake, tapered off TPN 4/9, but restarted 4/18 as above  - 4/22 TPN to increase to 45/hr, continue PO intake noted. Appreciate dietician assistance, discussed plan.    Shock Liver with Elevated LFT's   - related to Retroperitoneal Bleed, resolving     Thrombocytopenia -- suspect consumption from bleeding  - follow CBC - stable/improved 4/20 - 102k --> 139k 4/21 --> 194k 4/22 --> 222k 4/23     Leukocytosis - likely stress and steroid related, afebrile                Epistaxis  - now stable  - monitor with resumption of lovenox 40 daily     Hyponatremia  - mild, 2nd to SIADH from stress and pulm process  - stable, low 130s primarily     Steroid induced hyperglycemia -- resolved  - restart insulin as need     Urinary incontinence -- has pure wick     Hypokalemia, variable - replacement protocol     Hypertension  - continue PTA amlodipine when taking PO (on  bipap)     Sinus tachycardia -- clinically improved  - metoprolol 12.5 mg bid po or IV 2.5 q 6 given npo     Asthma  MARCELLE  - nebs prn  - resume CPAP with home setting 4/9     Depression and anxiety  Insomnia  - continue PTA Prozac and Buspar (can unhold orders if ok with speech eval)  - continue PTA Atarax for sleep  - prn lorazepam     GERD  - switch to PPI as above         Diet: Room Service  Advance Diet as Tolerated  Combination Diet Full Liquid Diet; Nectar Thickened Liquids (pre-thickened or use instant food thickener) (by spoon and/or straw pending work of breathing)  parenteral nutrition - ADULT compounded formula  Snacks/Supplements Adult: Other; Chocolate Magic Shake (RD); With Meals    DVT Prophylaxis: Enoxaparin (Lovenox) subcutaneous, ivc filter in place  Martino Catheter: not present  Code Status: Full Code           Disposition Plan   Expected discharge: hopefully transferring to the [facility] timing/availability. Several days in hospital at least.  Entered: Duke Bernabe MD 04/23/2021, 9:25 AM       The patient's care was discussed with the Bedside Nurse, Patient, Patient's Family, Pulmonology Consultant and Oncology Consultant, Radiation Oncology Consultant, and patient's primary Neuro-oncologist..    Duke Bernabe MD  Hospitalist Service  [facility]  Contact information available via [facility] Paging/Directory    ______________________________________________________________________    Interval History   Patient seen and examined this morning.  Overall continued stability/slight improvement from respiratory standpoint.  She feels her breathing has improved.  No new fevers, chills, or cough.  She does have some progression in her abdominal distention and discomfort.  No significant flatus or BM just yet.  Fleets enema given just before I saw her.  Abdominal x-ray pending.  Lengthy discussions with multiple consultants today from oncology and radiation oncology and  overall ideal plan would be to get her to the [facility] so as to facilitate the next steps in her treatment assuming her respiratory status continues to improve.  See above for further details.  Will attempt to decrease steroid frequency as well as Lasix.  Appreciate all consultants assistance.    Data reviewed today: I reviewed all medications, new labs and imaging results over the last 24 hours. I personally reviewed no images or EKG's today.    Physical Exam   Vital Signs: Temp: 97.5  F (36.4  C) Temp src: Axillary BP: (!) 152/79 Pulse: 75   Resp: 21 SpO2: 92 % O2 Device: High Flow Nasal Cannula (HFNC) Oxygen Delivery: 35 LPM  Weight: 152 lbs 12.46 oz    Gen: NAD, very pleasant as always, HFNC in place  HEENT: Normocephalic, EOMI, MMM, lower lip lesion slowly improving  Resp: no focal anterior crackles, no wheezes, appears comfortable on current setting  CV: S1S2 heard, reg rhythm, reg rate  Abdo: some mild tenderness on palpation through out, no rebound, mildly distended, BS present but diminished  Ext: calves nontender, well perfused  Neuro: AAOx3, CN grossly intact, no facial asymmetry    Data   Recent Labs   Lab 04/23/21  0550 04/22/21  0540 04/21/21  0630   WBC 13.3* 15.7* 11.6*   HGB 9.8* 9.4* 8.9*   MCV 87 86 86    194 139*   * 131* 133   POTASSIUM 3.5 3.7 3.9   CHLORIDE 95 95 96   CO2 30 31 32   BUN 26 24 22   CR 0.46* 0.46* 0.45*   ANIONGAP 6 5 5   ESTIVEN 9.2 9.1 9.2   * 114* 113*   ALBUMIN  --  2.4* 2.3*   PROTTOTAL  --  5.9* 6.0*   BILITOTAL  --  0.5 0.4   ALKPHOS  --  99 95   ALT  --  250* 325*   AST  --  30 44     Recent Results (from the past 24 hour(s))   XR Abdomen Port 1 View    Narrative    XR ABDOMEN PORT 1 VIEWS 4/23/2021 11:59 AM    HISTORY: distension, hx of ileus    COMPARISON: 4/18/2021      Impression    IMPRESSION: Bowel gas pattern is nonobstructed. Moderate amount of  stool in the colon. IVC filter present.    BIA W VELDMAN, MD

## 2021-04-23 NOTE — PROGRESS NOTES
Brief hospitalist note    Re-rounded on patient around 17:30, O2 need improving, small BM reported, but distension and abdo discomfort worsened still. Will order CT abdo pelvis with con. IV fluids at 75/hr for 6 hrs brad-CT scan ordered. Discussed with RN, patient, and patient's daughter.

## 2021-04-23 NOTE — PROGRESS NOTES
"PULMONOLOGY PROGRESS NOTE    Date of Admission: 3/26/2021    CC/Reason for Hospital visit: Acute hypoxic respiratory failure, suspected PJP  SUBJECTIVE      Events of last night reviewed. On BiPAP overnight. Stable night. No new respiratory problems. Back on HFNC this AM. States breathing is slightly better today.    ROS: A Problem Pertinent review of systems was negative except for items noted in HPI.  Past Medical, Family, and Social/Substance History has been reviewed: No interval changes.    OBJECTIVE   Vital signs:  Temp: 97.7  F (36.5  C) Temp src: Oral BP: (!) 150/88 Pulse: 75   Resp: 20 SpO2: 97 % O2 Device: High Flow Nasal Cannula (HFNC) Oxygen Delivery: 35 LPM Height: 160 cm (5' 3\") Weight: 69.3 kg (152 lb 12.5 oz)  Estimated body mass index is 27.06 kg/m  as calculated from the following:    Height as of this encounter: 1.6 m (5' 3\").    Weight as of this encounter: 69.3 kg (152 lb 12.5 oz).        I/O last 3 completed shifts:  In: 564.5 [P.O.:480]  Out: 1625 [Urine:1625]      CONSTITUTIONAL/GENERAL APPEARANCE: Aler female. No Apparent Distress.  PSYCHIATRIC: Pleasant and appropriate mood and affect. Oriented x 3.  EARS, NOSE,THROAT,MOUTH: External ears and nose overall normal. Normal oral mucosa.   NECK: Neck appearance normal. No neck masses and the thyroid is not enlarged.   RESPIRATORY: Non-labored effort. Decreased BS, scattered crackles posteriorly.  No wheezes.  CARDIOVASCULAR: S1, S2, regular rate and rhythm.    LABORATORY ASSESSMENT    Arterial Blood Gas  Recent Labs   Lab 04/19/21  1150   PH 7.51*   PCO2 38   PO2 65*   HCO3 30*   O2PER 60     CBC  Recent Labs   Lab 04/23/21  0550 04/22/21  0540 04/21/21  0630 04/20/21  0710   WBC 13.3* 15.7* 11.6* 9.9   RBC 3.41* 3.27* 3.19* 3.11*   HGB 9.8* 9.4* 8.9* 8.8*   HCT 29.7* 28.0* 27.5* 26.7*   MCV 87 86 86 86   MCH 28.7 28.7 27.9 28.3   MCHC 33.0 33.6 32.4 33.0   RDW 16.6* 16.8* 16.7* 17.1*    194 139* 102*     BMP  Recent Labs   Lab " 04/23/21  0550 04/22/21  0540 04/21/21  0630 04/20/21  0710   * 131* 133 131*   POTASSIUM 3.5 3.7 3.9 3.7   CHLORIDE 95 95 96 96   ESTIVEN 9.2 9.1 9.2 8.7   CO2 30 31 32 30   BUN 26 24 22 21   CR 0.46* 0.46* 0.45* 0.40*   * 114* 113* 95     INR  No lab results found in last 7 days.   BNPNo lab results found in last 7 days.  VENOUS BLOOD GASESNo lab results found in last 7 days.    Additional labs and/or comments:      IMAGING      CT Chest 4/19 -  IMPRESSION:  1.  No pulmonary embolism demonstrated.  2.  Extensive bilateral pulmonary infiltrates. These are increased  from previous considerably.    CXR 4/19 -  IMPRESSION: Right PICC line tip in cavoatrial region. Shallow inspiration. Stable cardia mediastinal silhouette. Bilateral infiltrates.    CXR 4/17 -  IMPRESSION: Moderately extensive interstitial and airspace infiltrates  bilaterally similar to slightly worse than previous. Right-sided PICC  line tip in the low SVC.    PFT & OTHER TESTING     Echo 4/20 -  Interpretation Summary  Left ventricular systolic function is normal.The visual ejection fraction is  estimated at 55-60%.  The right ventricular systolic function is normal.  There is mild (1+) tricuspid regurgitation.    ASSESSMENT / PLAN      Pulmonary Diagnoses:  Abnl CT/CXR R91.8  Asthma unspec J45.909  Hypoxemia R09.02  Pneumonia unspec J18.9  Resp fail acute J96.00    Additional COVID-19 diagnoses:  Concern of possible exposure to COVID-19, Now RULED OUT Z03.818    ASSESSMENT: 75-year-old female with a history of asthma and obstructive sleep apnea on CPAP, recent diagnosis of glioblastoma status post resection 2/23/21 admitted with progressive dyspnea. Admission CT Chest showed bilateral patchy groundglass opacities, negative for PE.  Initially treated with ceftriaxone and doxy. COVID19 negative 3/26 and 3/29.  Increased oxygen needs 3/29, ultimately requiring BiPAP, with repeat CXR showing increase in bilateral patchy opacities.  Found to  have DVT 3/29.  ID consulted 3/29, high suspicion for PJP pneumonia (LDH high, high beta d glucan), unable to confirm with the sputum studies as patient has been unable to provide any sputum.  PJP suspected secondary to prolonged steroid course following neurosurgery, switched from ceftriaxone to zosyn 3/29 and initiated on high dose Bactrim and increased steroids.  Follow-up CXRs 4/2 and 4/5 show persistent patchy infiltrates bilaterally. Chest x-rays 4/2 and 4/5 showed persistent patchy infiltrates bilaterally.  Recent hospital course notable for retroperitoneal bleed due to anticoagulation, and increased shortness of breath due to volume overload.  Follow-up chest x-rays 4/17 and 4/19 showed increased interstitial infiltrates.  CT scan of the chest 4/19 shows extensive bilateral pulmonary infiltrates which have increased when compared to the prior CT chest 3/26.  Decompensation likely reflects worsening PJP pneumonia versus superimposed acute lung injury.  Patient is not currently a candidate for bronchoscopy due to high FiO2 requirements and need for BiPAP. If diagnostic specimens required, she will either need to transfer to ICU for elective intubation and bronchoscopy by the Critical Care service vs OLBx by Thoracic Surgery.  SoluMedrol increased 4/20, seems somewhat better today. Would continue present rx for now.    PLAN:  1. Adjust oxygen, keep SaO2 > 90%  2. BiPAP prn.  3. Antibiotics - Bactrim  4. Steroids - SoluMedrol q12  5. Diuresis - Lasix as ordered.      Discussed briefly with Dr. Bernabe at bedside.    Manuel Bauer M.D.  [facility]  [facility]  [phone]  Pager [phone]

## 2021-04-24 ENCOUNTER — APPOINTMENT (OUTPATIENT)
Dept: SPEECH THERAPY | Facility: CLINIC | Age: 76
DRG: 166 | End: 2021-04-24
Attending: HOSPITALIST
Payer: MEDICARE

## 2021-04-24 PROBLEM — B59: Status: ACTIVE | Noted: 2021-04-24

## 2021-04-24 LAB
ALBUMIN SERPL-MCNC: 2.6 G/DL (ref 3.4–5)
ALP SERPL-CCNC: 120 U/L (ref 40–150)
ALT SERPL W P-5'-P-CCNC: 161 U/L (ref 0–50)
ANION GAP SERPL CALCULATED.3IONS-SCNC: 5 MMOL/L (ref 3–14)
AST SERPL W P-5'-P-CCNC: 37 U/L (ref 0–45)
BILIRUB SERPL-MCNC: 1.3 MG/DL (ref 0.2–1.3)
BUN SERPL-MCNC: 27 MG/DL (ref 7–30)
CALCIUM SERPL-MCNC: 9 MG/DL (ref 8.5–10.1)
CHLORIDE SERPL-SCNC: 97 MMOL/L (ref 94–109)
CO2 SERPL-SCNC: 29 MMOL/L (ref 20–32)
CREAT SERPL-MCNC: 0.45 MG/DL (ref 0.52–1.04)
ERYTHROCYTE [DISTWIDTH] IN BLOOD BY AUTOMATED COUNT: 16.4 % (ref 10–15)
GFR SERPL CREATININE-BSD FRML MDRD: >90 ML/MIN/{1.73_M2}
GLUCOSE BLDC GLUCOMTR-MCNC: 115 MG/DL (ref 70–99)
GLUCOSE BLDC GLUCOMTR-MCNC: 118 MG/DL (ref 70–99)
GLUCOSE BLDC GLUCOMTR-MCNC: 138 MG/DL (ref 70–99)
GLUCOSE BLDC GLUCOMTR-MCNC: 181 MG/DL (ref 70–99)
GLUCOSE BLDC GLUCOMTR-MCNC: 239 MG/DL (ref 70–99)
GLUCOSE SERPL-MCNC: 137 MG/DL (ref 70–99)
HCT VFR BLD AUTO: 31 % (ref 35–47)
HGB BLD-MCNC: 10.1 G/DL (ref 11.7–15.7)
LACTATE BLD-SCNC: 2 MMOL/L (ref 0.7–2)
MCH RBC QN AUTO: 28.5 PG (ref 26.5–33)
MCHC RBC AUTO-ENTMCNC: 32.6 G/DL (ref 31.5–36.5)
MCV RBC AUTO: 88 FL (ref 78–100)
PLATELET # BLD AUTO: 247 10E9/L (ref 150–450)
POTASSIUM SERPL-SCNC: 3.6 MMOL/L (ref 3.4–5.3)
PROT SERPL-MCNC: 6 G/DL (ref 6.8–8.8)
RBC # BLD AUTO: 3.54 10E12/L (ref 3.8–5.2)
SODIUM SERPL-SCNC: 131 MMOL/L (ref 133–144)
WBC # BLD AUTO: 15.2 10E9/L (ref 4–11)

## 2021-04-24 PROCEDURE — 250N000011 HC RX IP 250 OP 636: Performed by: NURSE PRACTITIONER

## 2021-04-24 PROCEDURE — 999N001017 HC STATISTIC GLUCOSE BY METER IP

## 2021-04-24 PROCEDURE — 85027 COMPLETE CBC AUTOMATED: CPT | Performed by: HOSPITALIST

## 2021-04-24 PROCEDURE — 250N000013 HC RX MED GY IP 250 OP 250 PS 637: Performed by: HOSPITALIST

## 2021-04-24 PROCEDURE — 258N000003 HC RX IP 258 OP 636: Performed by: HOSPITALIST

## 2021-04-24 PROCEDURE — 250N000011 HC RX IP 250 OP 636: Performed by: HOSPITALIST

## 2021-04-24 PROCEDURE — 250N000009 HC RX 250: Performed by: HOSPITALIST

## 2021-04-24 PROCEDURE — C9113 INJ PANTOPRAZOLE SODIUM, VIA: HCPCS | Performed by: HOSPITALIST

## 2021-04-24 PROCEDURE — 999N000157 HC STATISTIC RCP TIME EA 10 MIN

## 2021-04-24 PROCEDURE — 999N000190 HC STATISTIC VAT ROUNDS

## 2021-04-24 PROCEDURE — 83605 ASSAY OF LACTIC ACID: CPT | Performed by: INTERNAL MEDICINE

## 2021-04-24 PROCEDURE — 120N000013 HC R&B IMCU

## 2021-04-24 PROCEDURE — 250N000013 HC RX MED GY IP 250 OP 250 PS 637: Performed by: INTERNAL MEDICINE

## 2021-04-24 PROCEDURE — 999N000040 HC STATISTIC CONSULT NO CHARGE VASC ACCESS

## 2021-04-24 PROCEDURE — 92526 ORAL FUNCTION THERAPY: CPT | Mod: GN | Performed by: SPEECH-LANGUAGE PATHOLOGIST

## 2021-04-24 PROCEDURE — 99233 SBSQ HOSP IP/OBS HIGH 50: CPT | Performed by: HOSPITALIST

## 2021-04-24 PROCEDURE — 80053 COMPREHEN METABOLIC PANEL: CPT | Performed by: HOSPITALIST

## 2021-04-24 RX ORDER — BISACODYL 10 MG
10 SUPPOSITORY, RECTAL RECTAL DAILY PRN
Status: DISCONTINUED | OUTPATIENT
Start: 2021-04-24 | End: 2021-04-26 | Stop reason: HOSPADM

## 2021-04-24 RX ORDER — MESALAMINE 1000 MG/1
1000 SUPPOSITORY RECTAL AT BEDTIME
Status: DISCONTINUED | OUTPATIENT
Start: 2021-04-24 | End: 2021-04-26 | Stop reason: HOSPADM

## 2021-04-24 RX ORDER — SIMETHICONE 40MG/0.6ML
40 SUSPENSION, DROPS(FINAL DOSAGE FORM)(ML) ORAL EVERY 6 HOURS PRN
Status: DISCONTINUED | OUTPATIENT
Start: 2021-04-24 | End: 2021-04-26 | Stop reason: HOSPADM

## 2021-04-24 RX ADMIN — ACETAMINOPHEN 650 MG: 325 TABLET, FILM COATED ORAL at 20:22

## 2021-04-24 RX ADMIN — HYDROXYZINE HYDROCHLORIDE 50 MG: 25 TABLET, FILM COATED ORAL at 20:22

## 2021-04-24 RX ADMIN — ONDANSETRON 4 MG: 2 INJECTION INTRAMUSCULAR; INTRAVENOUS at 04:07

## 2021-04-24 RX ADMIN — ACYCLOVIR: 50 OINTMENT TOPICAL at 20:22

## 2021-04-24 RX ADMIN — PANTOPRAZOLE SODIUM 40 MG: 40 INJECTION, POWDER, FOR SOLUTION INTRAVENOUS at 17:14

## 2021-04-24 RX ADMIN — METOPROLOL TARTRATE 2.5 MG: 1 INJECTION, SOLUTION INTRAVENOUS at 20:10

## 2021-04-24 RX ADMIN — METHYLPREDNISOLONE SODIUM SUCCINATE 62.5 MG: 125 INJECTION, POWDER, FOR SOLUTION INTRAMUSCULAR; INTRAVENOUS at 15:38

## 2021-04-24 RX ADMIN — PANTOPRAZOLE SODIUM 40 MG: 40 INJECTION, POWDER, FOR SOLUTION INTRAVENOUS at 08:22

## 2021-04-24 RX ADMIN — METOPROLOL TARTRATE 2.5 MG: 1 INJECTION, SOLUTION INTRAVENOUS at 08:23

## 2021-04-24 RX ADMIN — LEVETIRACETAM 250 MG: 100 INJECTION, SOLUTION INTRAVENOUS at 20:16

## 2021-04-24 RX ADMIN — METHYLPREDNISOLONE SODIUM SUCCINATE 62.5 MG: 125 INJECTION, POWDER, FOR SOLUTION INTRAMUSCULAR; INTRAVENOUS at 04:07

## 2021-04-24 RX ADMIN — ACYCLOVIR: 50 OINTMENT TOPICAL at 08:36

## 2021-04-24 RX ADMIN — FLUOXETINE HYDROCHLORIDE 80 MG: 20 LIQUID ORAL at 08:36

## 2021-04-24 RX ADMIN — ACYCLOVIR: 50 OINTMENT TOPICAL at 06:28

## 2021-04-24 RX ADMIN — ACETAMINOPHEN 650 MG: 325 TABLET, FILM COATED ORAL at 04:07

## 2021-04-24 RX ADMIN — METOPROLOL TARTRATE 2.5 MG: 1 INJECTION, SOLUTION INTRAVENOUS at 14:01

## 2021-04-24 RX ADMIN — ACETAMINOPHEN 650 MG: 325 TABLET, FILM COATED ORAL at 15:38

## 2021-04-24 RX ADMIN — LORAZEPAM 0.5 MG: 2 INJECTION INTRAMUSCULAR; INTRAVENOUS at 18:05

## 2021-04-24 RX ADMIN — ENOXAPARIN SODIUM 40 MG: 40 INJECTION SUBCUTANEOUS at 15:38

## 2021-04-24 RX ADMIN — LEVETIRACETAM 250 MG: 100 INJECTION, SOLUTION INTRAVENOUS at 08:36

## 2021-04-24 RX ADMIN — SENNOSIDES AND DOCUSATE SODIUM 2 TABLET: 8.6; 5 TABLET ORAL at 13:06

## 2021-04-24 RX ADMIN — ACYCLOVIR: 50 OINTMENT TOPICAL at 04:12

## 2021-04-24 RX ADMIN — ACYCLOVIR: 50 OINTMENT TOPICAL at 17:14

## 2021-04-24 RX ADMIN — MESALAMINE 1000 MG: 1000 SUPPOSITORY RECTAL at 22:19

## 2021-04-24 RX ADMIN — BUSPIRONE HYDROCHLORIDE 30 MG: 15 TABLET ORAL at 08:24

## 2021-04-24 RX ADMIN — SIMETHICONE 40 MG: 20 EMULSION ORAL at 22:18

## 2021-04-24 RX ADMIN — POLYETHYLENE GLYCOL 3350 17 G: 17 POWDER, FOR SOLUTION ORAL at 13:06

## 2021-04-24 RX ADMIN — I.V. FAT EMULSION 250 ML: 20 EMULSION INTRAVENOUS at 20:30

## 2021-04-24 RX ADMIN — SULFAMETHOXAZOLE AND TRIMETHOPRIM 320 MG: 80; 16 INJECTION, SOLUTION, CONCENTRATE INTRAVENOUS at 10:01

## 2021-04-24 RX ADMIN — BISACODYL 10 MG: 10 SUPPOSITORY RECTAL at 14:22

## 2021-04-24 RX ADMIN — SULFAMETHOXAZOLE AND TRIMETHOPRIM 320 MG: 80; 16 INJECTION, SOLUTION, CONCENTRATE INTRAVENOUS at 21:29

## 2021-04-24 RX ADMIN — ACYCLOVIR: 50 OINTMENT TOPICAL at 14:02

## 2021-04-24 RX ADMIN — ONDANSETRON 4 MG: 2 INJECTION INTRAMUSCULAR; INTRAVENOUS at 18:05

## 2021-04-24 RX ADMIN — SENNOSIDES AND DOCUSATE SODIUM 2 TABLET: 8.6; 5 TABLET ORAL at 20:22

## 2021-04-24 RX ADMIN — ACYCLOVIR: 50 OINTMENT TOPICAL at 11:59

## 2021-04-24 RX ADMIN — LINACLOTIDE 290 MCG: 290 CAPSULE, GELATIN COATED ORAL at 15:50

## 2021-04-24 RX ADMIN — METOPROLOL TARTRATE 2.5 MG: 1 INJECTION, SOLUTION INTRAVENOUS at 02:11

## 2021-04-24 RX ADMIN — BUSPIRONE HYDROCHLORIDE 30 MG: 15 TABLET ORAL at 20:22

## 2021-04-24 RX ADMIN — POTASSIUM CHLORIDE: 2 INJECTION, SOLUTION, CONCENTRATE INTRAVENOUS at 20:29

## 2021-04-24 ASSESSMENT — ACTIVITIES OF DAILY LIVING (ADL)
ADLS_ACUITY_SCORE: 23
ADLS_ACUITY_SCORE: 22
ADLS_ACUITY_SCORE: 23
ADLS_ACUITY_SCORE: 22

## 2021-04-24 ASSESSMENT — MIFFLIN-ST. JEOR: SCORE: 1143.13

## 2021-04-24 NOTE — PLAN OF CARE
Pt A/Ox4. Lethargic at times. On high flow sats in mid 90s. Afebrile. HR stable 80-90s. Hypertensive at times (140s-150s systolic. Scheduled BP med given). Given low does of dilaudid x1 for abdominal pain. NSR on tele. Diminished lung sounds. Up to chair today for two hours using ceiling lift. Pt tolerating diet (encouraging oral intake). zofran given x1 for nausea. Oral cares and hygiene care  performed. Incontinent of urine. Miralax, senna given, and suppository given (small bowel movements). Abdominal bruising. Mepi to coccyx for prevention (blanchable redness). PICC in place with TPN infusing at goal rate. Waiting for bed at [facility] to open for transfer.

## 2021-04-24 NOTE — PROGRESS NOTES
"PULMONOLOGY PROGRESS NOTE    Date of Admission: 3/26/2021    CC/Reason for Hospital visit: Acute hypoxic respiratory failure, suspected PJP  SUBJECTIVE      Events of last night reviewed. On BiPAP overnight. Stable night. No new respiratory problems. . States breathing is slightly better today.    ROS: A Problem Pertinent review of systems was negative except for items noted in HPI.  Past Medical, Family, and Social/Substance History has been reviewed: No interval changes.    OBJECTIVE   Vital signs:  Temp: 96.4  F (35.8  C) Temp src: Axillary BP: 133/81 Pulse: 89   Resp: 25 SpO2: 95 % O2 Device: High Flow Nasal Cannula (HFNC) Oxygen Delivery: 30 LPM, 48% FIO2 Height: 160 cm (5' 3\") Weight: 67.9 kg (149 lb 11.1 oz)  Estimated body mass index is 26.52 kg/m  as calculated from the following:    Height as of this encounter: 1.6 m (5' 3\").    Weight as of this encounter: 67.9 kg (149 lb 11.1 oz).        I/O last 3 completed shifts:  In: 1591.3 [P.O.:120; I.V.:500]  Out: 500 [Urine:500]      CONSTITUTIONAL/GENERAL APPEARANCE: Aler female. No Apparent Distress.  PSYCHIATRIC: Pleasant and appropriate mood and affect. Oriented x 3.  EARS, NOSE,THROAT,MOUTH: External ears and nose overall normal. Normal oral mucosa.   NECK: Neck appearance normal. No neck masses and the thyroid is not enlarged.   RESPIRATORY: Non-labored effort. Decreased BS, scattered crackles posteriorly.  No wheezes.  CARDIOVASCULAR: S1, S2, regular rate and rhythm.    LABORATORY ASSESSMENT    Arterial Blood Gas  Recent Labs   Lab 04/19/21  1150   PH 7.51*   PCO2 38   PO2 65*   HCO3 30*   O2PER 60     CBC  Recent Labs   Lab 04/24/21  0635 04/23/21  0550 04/22/21  0540 04/21/21  0630   WBC 15.2* 13.3* 15.7* 11.6*   RBC 3.54* 3.41* 3.27* 3.19*   HGB 10.1* 9.8* 9.4* 8.9*   HCT 31.0* 29.7* 28.0* 27.5*   MCV 88 87 86 86   MCH 28.5 28.7 28.7 27.9   MCHC 32.6 33.0 33.6 32.4   RDW 16.4* 16.6* 16.8* 16.7*    222 194 139*     BMP  Recent Labs   Lab " 04/24/21  0635 04/23/21  0550 04/22/21  0540 04/21/21  0630   * 131* 131* 133   POTASSIUM 3.6 3.5 3.7 3.9   CHLORIDE 97 95 95 96   ESTIVEN 9.0 9.2 9.1 9.2   CO2 29 30 31 32   BUN 27 26 24 22   CR 0.45* 0.46* 0.46* 0.45*   * 154* 114* 113*     INR  No lab results found in last 7 days.   BNPNo lab results found in last 7 days.  VENOUS BLOOD GASESNo lab results found in last 7 days.    Additional labs and/or comments:      IMAGING      CT Chest 4/19 -  IMPRESSION:  1.  No pulmonary embolism demonstrated.  2.  Extensive bilateral pulmonary infiltrates. These are increased  from previous considerably.    CXR 4/19 -  IMPRESSION: Right PICC line tip in cavoatrial region. Shallow inspiration. Stable cardia mediastinal silhouette. Bilateral infiltrates.    CXR 4/17 -  IMPRESSION: Moderately extensive interstitial and airspace infiltrates  bilaterally similar to slightly worse than previous. Right-sided PICC  line tip in the low SVC.    PFT & OTHER TESTING     Echo 4/20 -  Interpretation Summary  Left ventricular systolic function is normal.The visual ejection fraction is  estimated at 55-60%.  The right ventricular systolic function is normal.  There is mild (1+) tricuspid regurgitation.    ASSESSMENT / PLAN      Pulmonary Diagnoses:  Abnl CT/CXR R91.8  Asthma unspec J45.909  Hypoxemia R09.02  Pneumonia unspec J18.9  Resp fail acute J96.00    Additional COVID-19 diagnoses:  Concern of possible exposure to COVID-19, Now RULED OUT Z03.818    ASSESSMENT: 75-year-old female with a history of asthma and obstructive sleep apnea on CPAP, recent diagnosis of glioblastoma status post resection 2/23/21 admitted with progressive dyspnea. Admission CT Chest showed bilateral patchy groundglass opacities, negative for PE.  Initially treated with ceftriaxone and doxy. COVID19 negative 3/26 and 3/29.  Increased oxygen needs 3/29, ultimately requiring BiPAP, with repeat CXR showing increase in bilateral patchy opacities.  Found to  have DVT 3/29.  ID consulted 3/29, high suspicion for PJP pneumonia (LDH high, high beta d glucan), unable to confirm with the sputum studies as patient has been unable to provide any sputum.  PJP suspected secondary to prolonged steroid course following neurosurgery, switched from ceftriaxone to zosyn 3/29 and initiated on high dose Bactrim and increased steroids.  Follow-up CXRs 4/2 and 4/5 show persistent patchy infiltrates bilaterally. Chest x-rays 4/2 and 4/5 showed persistent patchy infiltrates bilaterally.  Recent hospital course notable for retroperitoneal bleed due to anticoagulation, and increased shortness of breath due to volume overload.  Follow-up chest x-rays 4/17 and 4/19 showed increased interstitial infiltrates.  CT scan of the chest 4/19 shows extensive bilateral pulmonary infiltrates which have increased when compared to the prior CT chest 3/26.  Decompensation likely reflects worsening PJP pneumonia versus superimposed acute lung injury.  Patient is not currently a candidate for bronchoscopy due to high FiO2 requirements and need for BiPAP. If diagnostic specimens required, she will either need to transfer to ICU for elective intubation and bronchoscopy by the Critical Care service vs OLBx by Thoracic Surgery. Would continue present rx for now.    PLAN:  1. Adjust oxygen, keep SaO2 > 90%  2. BiPAP prn.  3. Antibiotics - Bactrim  4. Steroids - SoluMedrol q12  5. Diuresis - Lasix as ordered.    Anticipating transfer to the [facility] to start chemotherapy.      Manuel Bauer M.D.  [facility]  [facility]  [phone]  Pager [phone]

## 2021-04-24 NOTE — PROGRESS NOTES
[facility]  Gastroenterology Progress Note     Olga Bailey MRN# [id number]   YOB: 1945 Age: 75 year old          Assessment and Plan:     Pneumonia due to 2019 novel coronavirus    Essential hypertension    Glioblastoma -- S/P Surg Resection 2/23/21    Hypoxia    Acute respiratory failure with hypoxia (H)    Thrombocytopenia (H)    Other impaction of intestine (H)    Gastroesophageal reflux disease without esophagitis    Hyponatremia    Pneumocystosis pneumonia (H)  Patient is overall improving slowly patient has been on BiPAP overnight.  Patient has significant constipation CT scan done yesterday showed inflammation involving the rectum and perirectal area along with extensive amount of solid stool throughout the colon.  Patient findings are quite consistent with solitary rectal ulcer due to significant constipation and rectal mucosal compression due to hard stools.  I will recommend the patient be started on Linzess and I will recommend her to be started on Dulcolax suppository on a daily basis.  If patient symptoms do not improve patient may benefit from adding lactulose.  I will recommend the patient be started on Canasa suppositories every night.  Patient's findings were discussed with patient and family and also hospitalist team.  I will recommend the patient to have 1-2 bowel movements every day.  If patient develops recurrent constipation patient will be again at risk for ischemic injury of the bowel.            Pneumonia due to 2019 novel coronavirus  Hypoxia  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, unspecified part of lung (H)  Steroid-induced hyperglycemia  Essential hypertension  Acute deep vein thrombosis (DVT) of tibial vein of both lower extremities (H)  Anxiety  Other impaction of intestine (H)  Gastroesophageal reflux disease without esophagitis  Glioblastoma (H)  Hyponatremia      Interval History:   doing well; no cp, sob, n/v/d, or abd  pain.              Review of Systems:   C: NEGATIVE for fever, chills, change in weight  E/M: NEGATIVE for ear, mouth and throat problems  R: NEGATIVE for significant cough or SOB  CV: NEGATIVE for chest pain, palpitations or peripheral edema             Medications:   I have reviewed this patient's current medications    acyclovir   Topical Q3H     [Held by provider] amLODIPine  10 mg Oral Daily     busPIRone HCl  30 mg Oral BID     enoxaparin ANTICOAGULANT  40 mg Subcutaneous Q24H     FLUoxetine  80 mg Oral Daily     [Held by provider] furosemide  40 mg Intravenous Once     insulin aspart  1-12 Units Subcutaneous Q4H     levETIRAcetam  250 mg Intravenous Q12H     [Held by provider] levETIRAcetam  250 mg Oral BID     [START ON 4/25/2021] linaclotide  290 mcg Oral QAM AC     lipids  250 mL Intravenous Q24H     mesalamine  1,000 mg Rectal At Bedtime     methylPREDNISolone  62.5 mg Intravenous Q12H     metoprolol  2.5 mg Intravenous Q6H     [Held by provider] metoprolol tartrate  12.5 mg Oral BID     [Held by provider] pantoprazole  40 mg Oral BID AC     pantoprazole (PROTONIX) IV  40 mg Intravenous BID w/meals     polyethylene glycol  17 g Oral Daily     senna-docusate  2 tablet Oral BID    Or     senna-docusate  2 tablet Oral BID     sodium chloride (PF)  3 mL Intracatheter Q8H     sulfamethoxazole-trimethoprim  320 mg Intravenous Q12H                  Physical Exam:   Vitals were reviewed  Vital Signs with Ranges  Temp:  [96.2  F (35.7  C)-98.3  F (36.8  C)] 97.6  F (36.4  C)  Pulse:  [] 92  Resp:  [20-28] 24  BP: (129-157)/() 149/88  FiO2 (%):  [40 %-52 %] 40 %  SpO2:  [93 %-98 %] 94 %  I/O last 3 completed shifts:  In: 1591.3 [P.O.:120; I.V.:500]  Out: 500 [Urine:500]  Constitutional: healthy, alert and no distress   Cardiovascular: negative, PMI normal. No lifts, heaves, or thrills. RRR. No murmurs, clicks gallops or rub  Respiratory: negative, Percussion normal. Good diaphragmatic excursion. Lungs  clear  Head: Normocephalic. No masses, lesions, tenderness or abnormalities  Neck: Neck supple. No adenopathy. Thyroid symmetric, normal size,, Carotids without bruits.  Abdomen: Abdomen soft, non-tender. BS normal. No masses, organomegaly  SKIN: no suspicious lesions or rashes           Data:   I reviewed the patient's new clinical lab test results.   Recent Labs   Lab Test 04/24/21  0635 04/23/21  0550 04/22/21  0540 04/16/21  0815 04/16/21  0815 04/05/21  0640 04/05/21  0640 04/02/21  0550 04/02/21  0550   WBC 15.2* 13.3* 15.7*   < >  --    < > 15.4*   < > 10.9   HGB 10.1* 9.8* 9.4*   < >  --    < > 9.1*   < > 9.9*   MCV 88 87 86   < >  --    < > 88   < > 88    222 194   < >  --    < > 214   < > 206   INR  --   --   --   --  1.18*  --  1.02  --  1.00    < > = values in this interval not displayed.     Recent Labs   Lab Test 04/24/21  0635 04/23/21  0550 04/22/21  0540   POTASSIUM 3.6 3.5 3.7   CHLORIDE 97 95 95   CO2 29 30 31   BUN 27 26 24   ANIONGAP 5 6 5     Recent Labs   Lab Test 04/24/21  0635 04/22/21  0540 04/21/21  0630 04/14/21 2000 04/14/21 2000 04/02/21  1022 04/02/21  1022 02/17/21  1408 02/17/21  1408   ALBUMIN 2.6* 2.4* 2.3*   < >  --    < >  --    < >  --    BILITOTAL 1.3 0.5 0.4   < >  --    < >  --    < >  --    * 250* 325*   < >  --    < >  --    < >  --    AST 37 30 44   < >  --    < >  --    < >  --    PROTEIN  --   --   --   --  10*  --  Negative  --  Negative    < > = values in this interval not displayed.       I reviewed the patient's new imaging results.    All laboratory data reviewed  All imaging studies reviewed by me.    Jesús Hodge MD,  4/24/2021  [facility]  Office : [phone]  Cell: [phone]

## 2021-04-24 NOTE — PROVIDER NOTIFICATION
MD Notification    Notified Person: MD    Notified Person Name: Héctor Hurtado    Notification Date/Time: 4/23 @2347    Notification Interaction: [employer] text page with phone call    Purpose of Notification:  Lactic level came back at 2.4. Please advise    Orders Received: Observe for now. Repeat lactic in AM.     Comments:

## 2021-04-24 NOTE — PROGRESS NOTES
INFECTIOUS DISEASES PROGRESS NOTE    Assessment:  Patient with PMH asthma, GBM s/p resection 2/23 on dexamethasone taper PTA admitted 3/26 with dyspnea/bl pneumonia; thought to be PJP (fungitell/LDH up, multiple COVID PCR neg) with initial improvement on bactrim/pred, with then spontneous RP bleed and advancing respiratory failure. Respiratory failure likely due to lung injury, less likely worsening PJP pneumonia on therapy - seems to be responding to again increased dose steroids.     Recommendations:  -Continue therapeutic bactrim; plan to likely reduce dose to prophylactic dosing next week.     ID will continue to follow this patient.   Ruth Cohudhary MD  [phone]  Patient Active Problem List   Diagnosis Code     NONSPECIFIC MEDICAL HISTORY      Need for prophylactic hormone replacement therapy (postmenopausal) Z79.890     Other injury of other sites of trunk [id number]     Injury to other specified nerve(s) of shoulder girdle and upper limb S44.8X9A     Adjustment disorder with depressed mood F43.21     CARDIOVASCULAR SCREENING; LDL GOAL LESS THAN 160 Z13.6     Chronic obstructive pulmonary disease (H) J44.9     Major depression F32.9     Essential hypertension I10     Anxiety F41.9     Glioblastoma -- S/P Surg Resection 2/23/21 C71.9     Hypoxia R09.02     Acute respiratory failure with hypoxia (H) J96.01     Thrombocytopenia (H) D69.6     Other impaction of intestine (H) K56.49     Gastroesophageal reflux disease without esophagitis K21.9     Hyponatremia E87.1     Pneumocystosis pneumonia (H) B59       ------------------------------------------------------------------------------------------------------------------------------------------------------------------  Subjective/Interval Events:  -Afeb  -WBC up to 15 since increasing steroid dose  -On HFNC  -With abd pain, CT ordered and demonstrating possible early stercoral colitis  -Possible transfer to  in coming days; plan to do chemoradiation    Feeling  "tired today; no sob, cough. Having abdominal fullness and pain, hoping to have BM today. No rashes.     Physical Exam:  BP (!) 144/83   Pulse 81   Temp 96.4  F (35.8  C) (Axillary)   Resp 22   Ht 1.6 m (5' 3\")   Wt 67.9 kg (149 lb 11.1 oz)   SpO2 95%   BMI 26.52 kg/m       GENERAL:  Well-developed, well-nourished, sitting in bed in no acute distress.   ENT:  +healing vescicles on lips  EYES:  Eyes have anicteric sclerae.    LUNGS:  Diminished bl bases  CARDIOVASCULAR:  Regular rate and rhythm with no murmurs, gallops or rubs.  ABDOMEN:  Abd distended, mildly ttp, soft  SKIN:  No acute rashes.  NEUROLOGIC:  Fatigued appearing    Laboratory Data:  Creatinine   Date Value Ref Range Status   04/24/2021 0.45 (L) 0.52 - 1.04 mg/dL Final   04/23/2021 0.46 (L) 0.52 - 1.04 mg/dL Final   04/22/2021 0.46 (L) 0.52 - 1.04 mg/dL Final   04/21/2021 0.45 (L) 0.52 - 1.04 mg/dL Final   04/20/2021 0.40 (L) 0.52 - 1.04 mg/dL Final     WBC   Date Value Ref Range Status   04/24/2021 15.2 (H) 4.0 - 11.0 10e9/L Final   04/23/2021 13.3 (H) 4.0 - 11.0 10e9/L Final   04/22/2021 15.7 (H) 4.0 - 11.0 10e9/L Final   04/21/2021 11.6 (H) 4.0 - 11.0 10e9/L Final   04/20/2021 9.9 4.0 - 11.0 10e9/L Final     Hemoglobin   Date Value Ref Range Status   04/24/2021 10.1 (L) 11.7 - 15.7 g/dL Final     Platelet Count   Date Value Ref Range Status   04/24/2021 247 150 - 450 10e9/L Final     Lab Results   Component Value Date     (L) 04/24/2021    BUN 27 04/24/2021    CO2 29 04/24/2021     CRP Inflammation   Date Value Ref Range Status   04/22/2021 39.9 (H) 0.0 - 8.0 mg/L Final   03/29/2021 116.0 (H) 0.0 - 8.0 mg/L Final        Micro:  No results for input(s): CULT, SDES in the last 168 hours.    Imaging:  Recent Results (from the past 48 hour(s))   XR Abdomen Port 1 View    Narrative    XR ABDOMEN PORT 1 VIEWS 4/23/2021 11:59 AM    HISTORY: distension, hx of ileus    COMPARISON: 4/18/2021      Impression    IMPRESSION: Bowel gas pattern is " nonobstructed. Moderate amount of  stool in the colon. IVC filter present.    BIA MARI MD   CT Abdomen Pelvis w Contrast    Narrative    CT ABDOMEN PELVIS W CONTRAST 4/23/2021 7:00 PM    CLINICAL HISTORY: Abdominal distension; Abdominal pain, acute,  nonlocalized    TECHNIQUE: CT scan of the abdomen and pelvis was performed following  injection of IV contrast. Multiplanar reformats were obtained. Dose  reduction techniques were used.  CONTRAST: 76 mL Isovue-370    COMPARISON: Radiograph of earlier today and CT of the abdomen and  pelvis 4/16/2021. Chest CT 4/19/2020    FINDINGS:   LOWER CHEST: Resolved right pleural effusion. Linear interstitial  opacities in the lung bases are stable since the chest CT on  4/19/2021.    HEPATOBILIARY: Normal liver. No calcified gallstones or biliary ductal  dilatation.    PANCREAS: Normal.    SPLEEN: Normal.    ADRENAL GLANDS: Normal.    KIDNEYS/BLADDER: No significant mass, stones, or hydronephrosis.     BOWEL: No small bowel or colonic obstruction. Large amount of formed  stool in the colon and in the rectum. Mild perirectal soft tissue  stranding. Mild colonic diverticulosis. The appendix is not visualized  but there are no focal inflammatory changes in the right lower  quadrant.    PELVIC ORGANS: Hysterectomy.    ADDITIONAL FINDINGS: Decreased size of the right retroperitoneal  hematoma measuring about 11 x 6 x 7.9 cm, previously 11.6 x 9.6 cm.  Stable small left rectus sheath hematoma. No free fluid in the pelvis.  No extraluminal air. No new abscess or fluid collections. IVC filter.  Subcutaneous soft tissue nodules in the anterior abdominal wall at  sites of prior injections.    MUSCULOSKELETAL: Degenerative changes in the spine. No suspicious  lesions within the bones.      Impression    IMPRESSION:   1.  A large amount of formed stool in the colon and rectum with mild  nonspecific perirectal soft tissue stranding. Findings could indicate  mild/early stercoral  colitis.  2.  No other acute findings in the abdomen and pelvis.  3.  Slight decreased size of the large right retroperitoneal hematoma.  Stable small rectus sheath hematoma.    ELIANA PALACIOS MD

## 2021-04-24 NOTE — PROGRESS NOTES
Pt was on High flow nasal cannula. 40% Fio2 30 LPM. Diminished breath sounds. Pt was then placed on CPAP at 2100. IPAP of 5 50% fio2. Will continue to follow.  4/23/2021  Ewa Lamb

## 2021-04-24 NOTE — PLAN OF CARE
IMC status. A&Ox4. Lethargic at times. VSS ex High Flow, placed on BIPAP overnight. Tele: SR. Up with A2 Lift. Turn/Repo q2hrs. Full liquid, nectar thick diet. Pills crushed in pudding. TPN infusing @45mL/hr with lipids. BG checks q4hrs: 177, 239 & 118. C/o minimal abdominal pain, managed with PRN Tylenol, effective. BS active, hypoactive @times. Abdomen distended with bruising. C/o nausea, Zofran given. Incontinent B/B, BMx2 small loose. PICC to E. Discharge pending, plan to hopefully transfer to . Continue to monitor.

## 2021-04-24 NOTE — PROGRESS NOTES
[facility]    Medicine Progress Note - Hospitalist Service       Date of Admission:  3/26/2021  Assessment & Plan       75 year old female admitted on 3/26/2021.  Past medical history that is most notable for recent craniotomy and resection of glioblastoma multiforme, as well as uncomplicated asthma, who presents with dyspnea and is found to have acute bilateral pneumonia.     Acute hypoxic respiratory failure due to bilateral pneumonia, suspected PCP  - discussed with ID, all consistent with PCP  - Bactrim DS 2 tid would've been completed 4/19 (21 of 21) however given increased O2 needs will continue BID bactrim - defer to ID whether broadened coverage to be considered  - 4/21 - 4/22 - continue increased steroids with solumedrol (per pulm)  - continue bipap as needed, HFNC in between as tolerated  - CT PE no clot, bilat infiltrates were seen  - Pulmonology also following  - 4/21 - 4/24 continues to have some stability/improvement and has been able to tolerate high flow nasal cannula during the daytime which has been allowing her to have oral meds and some p.o. intake.  Bactrim continued ID plans appreciated  - possible upcoming decrease.    - 4/23 solumedrol 60 q 12h attempted - discussed with Pulmonology - will continue this regimen 4/24, consider further decrease in 1-2 days  - Repeat Covid 4/22 - NEGATIVE  - Goal O2 sat 90% or more  - encourage OOB and activity with RN and therapy     Ileus / constipation  Likely in part due to recent bleed and hematoma.   - TPN as above, encourage PO intake as tolerated  - continue scheduled / prn bowel regimen as able - suppos/enema as well  - 4/23 abdo plain film showed: Bowel gas pattern is nonobstructed. Moderate amount of  stool in the colon. IVC filter present.  - 4/24 some stool output - PUSH BOWEL REGIMEN PLEASE - discussed with RN  - discussed with Dr Hodge from GI - will start linzess and add simethicone as well.   - OOB and encourage  activity     Retroperitoneal bleed 2nd to Lovenox 4/14/2021 AM - resolving  Chronic anemia of chronic disease, with Acute blood loss  Baseline hgb 10-11 g/dL, stable around 8 - 9   - Hgb stable/improving today (hgb 10.1 <--9.8 <--  9.4 <-- 8.9 <-- 8.8 <-- 8.5 <-- 8.1 <-- 8.6)  - 4/23 due to abdo pain and distension, CT a/p with con was completed - stable/improving hematomas noted                Increased SOB, suspect mild fluid overload (~4/17) -- increased wt 2nd to blood and IV fluids and TPN  - CXR 4/18 with no new infiltrates  - follow I/O and daily weights though they are difficult to trust given everything on bed etc  - IV lasix 40 bid continued through 4/22  - IV lasix 40 daily starting 4/23 and continued - weight down to 149 4/24     GBM s/p resection 2/23/21  Followed by Dr. Baker of Neuro-Oncology.  She is in process of being set up to start chemotherapy and XRT possibly this Week  - has not yet initiated chemo (temozolomide) and planned radiation  - Family agrees on 2nd opinion with [facility] concerning GBM treatment, [facility] requested Dr. Stephanie Baker consult [facility] to initiate process (the Patient's Ex-, Dr. Renaldo Dow, is eager to hear what they recommend)  - will attempt to contact Dr. Baker regarding any possible option for treatment, ex-  - TRANSFER PLANS -  - keppra continued - defer this regimen, but previously plan was for 1 week bid starting 4/19 - would greatly appreciate Neuro-Onc update on regimen going forward  - 4/23 - see prior note for details  - 4/24 - again today, multiple lengthy discussions with consultants - greatly appreciate Dr Mcdonald and Dr Gamboa communication.  No plan / ability to transfer to the [facility]. Dr Baker talked to staff there and transfer not felt likely. I spoke with patient placement today who reported no chance of transfer to [facility] there.   - Currently plan will be determined on Monday by oncology and neuro-oncology.  Consideration of MRI here  and potentially radiation hinders on plans of Dr. Baker-in discussion with Dr. Mcdonald there are various regimens that could be used for her radiation therapy.  With that said, disposition location becomes an issue as well.  - We will consider brain MRI with and without contrast Sunday 4/25    Bilateral Lower Leg DVT on 3/29, hypercoagulable 2nd to GBM  - off Lovenox (last dose 4/14 AM) -- IR placed IVC filter 4/16  - 4/22 will restart Lovenox 40 mg subcutaneous as hgb stable and she is at high risk of further VTE    Non-severe malnutrition  Noted decrease oral intake and drop in albumin, subcutaneous fat loss on exam.   - PICC line in place  - previously with excellent oral intake, tapered off TPN 4/9, but restarted 4/18 as above  - 4/22 TPN to increase to 45/hr, continue PO intake noted. Appreciate dietician assistance, discussed plan.    Shock Liver with Elevated LFT's   - related to Retroperitoneal Bleed, resolving     Thrombocytopenia -- suspect consumption from bleeding  - follow CBC - stable/improved 4/20 - 102k --> 139k 4/21 --> 194k 4/22 --> 222k 4/23 --> 247k 4/24     Leukocytosis - likely stress and steroid related, afebrile                Epistaxis  - now stable  - monitor with resumption of lovenox 40 daily     Hyponatremia  - mild, 2nd to SIADH from stress and pulm process  - stable, low 130s primarily     Steroid induced hyperglycemia -- resolved  - restart insulin as need     Urinary incontinence -- has pure wick     Hypokalemia, variable - replacement protocol     Hypertension  - continue PTA amlodipine when taking PO (on bipap)     Sinus tachycardia -- clinically improved  - metoprolol 12.5 mg bid po or IV 2.5 q 6 given npo     Asthma  MARCELLE  - nebs prn  - resume CPAP with home setting 4/9     Depression and anxiety  Insomnia  - continue PTA Prozac and Buspar   - continue PTA Atarax for sleep  - prn lorazepam     GERD  - switch to PPI as above         Diet: Room Service  Advance Diet as  Tolerated  Combination Diet Full Liquid Diet; Nectar Thickened Liquids (pre-thickened or use instant food thickener) (by spoon and/or straw pending work of breathing)  Snacks/Supplements Adult: Other; Chocolate Magic Shake (RD); With Meals  parenteral nutrition - ADULT compounded formula    DVT Prophylaxis: Enoxaparin (Lovenox) SQ  Martino Catheter: not present  Code Status: Full Code           Disposition Plan   Expected discharge: 3+ days, TCU, possibly to the  for TCU and radiation if beds ever become available  Entered: Duke Bernabe MD 04/24/2021, 8:51 AM       The patient's care was discussed with the Bedside Nurse, Patient, Patient's Family, Oncology Consultant and Radiation oncology consultant, and GI consultant..    Duke Bernabe MD  Hospitalist Service  [facility]  Contact information available via [facility] Paging/Directory    ______________________________________________________________________    Interval History   Patient seen and examined this morning.  She is up in the chair overall stable/improved from a respiratory standpoint.  Denies new fevers, chills, or coughing.  Abdominal discomfort and distention continued.  She did have a few stool smears overnight.  We will continue increased bowel regimen and after discussing with GI will start Linzess.  See above for details on multiple discussions again regarding plan for GBM treatment and consideration of transferring.    Discussed importance of OOB and activity with RN and will have PT contacted again - patient needs to have activity.     Data reviewed today: I reviewed all medications, new labs and imaging results over the last 24 hours. I personally reviewed no images or EKG's today.    Physical Exam   Vital Signs: Temp: 96.4  F (35.8  C) Temp src: Axillary BP: (!) 144/83 Pulse: 81   Resp: 22 SpO2: 95 % O2 Device: High Flow Nasal Cannula (HFNC) Oxygen Delivery: 25 LPM  Weight: 149 lbs 11.08 oz    Gen: NAD, very pleasant  as always, HFNC in place, up in chair  HEENT: Normocephalic, EOMI, MMM, lower lip hematoma/blister slowly improving  Resp: no focal anterior crackles, no wheezes, appears comfortable on current setting  CV: S1S2 heard, reg rhythm, reg rate  Abdo: some mild tenderness on palpation through out, no rebound, mildly distended, BS present but diminished  Ext: calves nontender, well perfused  Neuro: AAOx3, CN grossly intact, no facial asymmetry    Data   Recent Labs   Lab 04/24/21  0635 04/23/21  0550 04/22/21  0540   WBC 15.2* 13.3* 15.7*   HGB 10.1* 9.8* 9.4*   MCV 88 87 86    222 194   * 131* 131*   POTASSIUM 3.6 3.5 3.7   CHLORIDE 97 95 95   CO2 29 30 31   BUN 27 26 24   CR 0.45* 0.46* 0.46*   ANIONGAP 5 6 5   ESTIVEN 9.0 9.2 9.1   * 154* 114*   ALBUMIN 2.6*  --  2.4*   PROTTOTAL 6.0*  --  5.9*   BILITOTAL 1.3  --  0.5   ALKPHOS 120  --  99   *  --  250*   AST 37  --  30     Recent Results (from the past 24 hour(s))   CT Abdomen Pelvis w Contrast    Narrative    CT ABDOMEN PELVIS W CONTRAST 4/23/2021 7:00 PM    CLINICAL HISTORY: Abdominal distension; Abdominal pain, acute,  nonlocalized    TECHNIQUE: CT scan of the abdomen and pelvis was performed following  injection of IV contrast. Multiplanar reformats were obtained. Dose  reduction techniques were used.  CONTRAST: 76 mL Isovue-370    COMPARISON: Radiograph of earlier today and CT of the abdomen and  pelvis 4/16/2021. Chest CT 4/19/2020    FINDINGS:   LOWER CHEST: Resolved right pleural effusion. Linear interstitial  opacities in the lung bases are stable since the chest CT on  4/19/2021.    HEPATOBILIARY: Normal liver. No calcified gallstones or biliary ductal  dilatation.    PANCREAS: Normal.    SPLEEN: Normal.    ADRENAL GLANDS: Normal.    KIDNEYS/BLADDER: No significant mass, stones, or hydronephrosis.     BOWEL: No small bowel or colonic obstruction. Large amount of formed  stool in the colon and in the rectum. Mild perirectal soft  tissue  stranding. Mild colonic diverticulosis. The appendix is not visualized  but there are no focal inflammatory changes in the right lower  quadrant.    PELVIC ORGANS: Hysterectomy.    ADDITIONAL FINDINGS: Decreased size of the right retroperitoneal  hematoma measuring about 11 x 6 x 7.9 cm, previously 11.6 x 9.6 cm.  Stable small left rectus sheath hematoma. No free fluid in the pelvis.  No extraluminal air. No new abscess or fluid collections. IVC filter.  Subcutaneous soft tissue nodules in the anterior abdominal wall at  sites of prior injections.    MUSCULOSKELETAL: Degenerative changes in the spine. No suspicious  lesions within the bones.      Impression    IMPRESSION:   1.  A large amount of formed stool in the colon and rectum with mild  nonspecific perirectal soft tissue stranding. Findings could indicate  mild/early stercoral colitis.  2.  No other acute findings in the abdomen and pelvis.  3.  Slight decreased size of the large right retroperitoneal hematoma.  Stable small rectus sheath hematoma.    ELIANA PALACIOS MD

## 2021-04-25 PROBLEM — H04.123 DRY EYES: Status: ACTIVE | Noted: 2021-04-25

## 2021-04-25 PROBLEM — B00.2 PRIMARY HSV INFECTION OF MOUTH: Status: ACTIVE | Noted: 2021-04-25

## 2021-04-25 PROBLEM — R13.19 OTHER DYSPHAGIA: Status: ACTIVE | Noted: 2021-04-25

## 2021-04-25 LAB
ALBUMIN SERPL-MCNC: 2.5 G/DL (ref 3.4–5)
ALBUMIN SERPL-MCNC: NORMAL G/DL (ref 3.4–5)
ALP SERPL-CCNC: 127 U/L (ref 40–150)
ALP SERPL-CCNC: NORMAL U/L (ref 40–150)
ALT SERPL W P-5'-P-CCNC: 138 U/L (ref 0–50)
ALT SERPL W P-5'-P-CCNC: NORMAL U/L (ref 0–50)
ANION GAP SERPL CALCULATED.3IONS-SCNC: 6 MMOL/L (ref 3–14)
ANION GAP SERPL CALCULATED.3IONS-SCNC: NORMAL MMOL/L (ref 6–17)
AST SERPL W P-5'-P-CCNC: 44 U/L (ref 0–45)
AST SERPL W P-5'-P-CCNC: NORMAL U/L (ref 0–45)
BASE DEFICIT BLDA-SCNC: 0.4 MMOL/L
BILIRUB SERPL-MCNC: 0.8 MG/DL (ref 0.2–1.3)
BILIRUB SERPL-MCNC: NORMAL MG/DL (ref 0.2–1.3)
BUN SERPL-MCNC: 23 MG/DL (ref 7–30)
BUN SERPL-MCNC: NORMAL MG/DL (ref 7–30)
CALCIUM SERPL-MCNC: 8.6 MG/DL (ref 8.5–10.1)
CALCIUM SERPL-MCNC: NORMAL MG/DL (ref 8.5–10.1)
CHLORIDE SERPL-SCNC: 97 MMOL/L (ref 94–109)
CHLORIDE SERPL-SCNC: NORMAL MMOL/L (ref 94–109)
CO2 SERPL-SCNC: 27 MMOL/L (ref 20–32)
CO2 SERPL-SCNC: NORMAL MMOL/L (ref 20–32)
CREAT SERPL-MCNC: 0.45 MG/DL (ref 0.52–1.04)
CREAT SERPL-MCNC: NORMAL MG/DL (ref 0.52–1.04)
ERYTHROCYTE [DISTWIDTH] IN BLOOD BY AUTOMATED COUNT: 16.7 % (ref 10–15)
GFR SERPL CREATININE-BSD FRML MDRD: >90 ML/MIN/{1.73_M2}
GFR SERPL CREATININE-BSD FRML MDRD: NORMAL ML/MIN/{1.73_M2}
GLUCOSE BLDC GLUCOMTR-MCNC: 110 MG/DL (ref 70–99)
GLUCOSE BLDC GLUCOMTR-MCNC: 113 MG/DL (ref 70–99)
GLUCOSE BLDC GLUCOMTR-MCNC: 130 MG/DL (ref 70–99)
GLUCOSE BLDC GLUCOMTR-MCNC: 145 MG/DL (ref 70–99)
GLUCOSE BLDC GLUCOMTR-MCNC: 172 MG/DL (ref 70–99)
GLUCOSE BLDC GLUCOMTR-MCNC: 186 MG/DL (ref 70–99)
GLUCOSE SERPL-MCNC: 153 MG/DL (ref 70–99)
GLUCOSE SERPL-MCNC: NORMAL MG/DL (ref 70–99)
HCO3 BLD-SCNC: 24 MMOL/L (ref 21–28)
HCT VFR BLD AUTO: 27.6 % (ref 35–47)
HGB BLD-MCNC: 9.4 G/DL (ref 11.7–15.7)
LACTATE BLD-SCNC: 1.2 MMOL/L (ref 0.7–2)
MAGNESIUM SERPL-MCNC: 2.2 MG/DL (ref 1.6–2.3)
MAGNESIUM SERPL-MCNC: NORMAL MG/DL (ref 1.6–2.3)
MCH RBC QN AUTO: 30.7 PG (ref 26.5–33)
MCHC RBC AUTO-ENTMCNC: 34.1 G/DL (ref 31.5–36.5)
MCV RBC AUTO: 90 FL (ref 78–100)
OXYHGB MFR BLD: 96 % (ref 92–100)
PCO2 BLD: 38 MM HG (ref 35–45)
PH BLD: 7.42 PH (ref 7.35–7.45)
PHOSPHATE SERPL-MCNC: 1.7 MG/DL (ref 2.5–4.5)
PLATELET # BLD AUTO: 231 10E9/L (ref 150–450)
PO2 BLD: 92 MM HG (ref 80–105)
POTASSIUM SERPL-SCNC: 4 MMOL/L (ref 3.4–5.3)
POTASSIUM SERPL-SCNC: NORMAL MMOL/L (ref 3.4–5.3)
PROCALCITONIN SERPL-MCNC: <0.05 NG/ML
PROT SERPL-MCNC: 5.7 G/DL (ref 6.8–8.8)
PROT SERPL-MCNC: NORMAL G/DL (ref 6.8–8.8)
RBC # BLD AUTO: 3.06 10E12/L (ref 3.8–5.2)
SODIUM SERPL-SCNC: 130 MMOL/L (ref 133–144)
SODIUM SERPL-SCNC: NORMAL MMOL/L (ref 133–144)
WBC # BLD AUTO: 13.6 10E9/L (ref 4–11)

## 2021-04-25 PROCEDURE — 999N000157 HC STATISTIC RCP TIME EA 10 MIN

## 2021-04-25 PROCEDURE — 85027 COMPLETE CBC AUTOMATED: CPT | Performed by: HOSPITALIST

## 2021-04-25 PROCEDURE — 250N000013 HC RX MED GY IP 250 OP 250 PS 637: Performed by: INTERNAL MEDICINE

## 2021-04-25 PROCEDURE — 99233 SBSQ HOSP IP/OBS HIGH 50: CPT | Performed by: HOSPITALIST

## 2021-04-25 PROCEDURE — 250N000011 HC RX IP 250 OP 636: Performed by: HOSPITALIST

## 2021-04-25 PROCEDURE — 250N000013 HC RX MED GY IP 250 OP 250 PS 637: Performed by: HOSPITALIST

## 2021-04-25 PROCEDURE — C9113 INJ PANTOPRAZOLE SODIUM, VIA: HCPCS | Performed by: HOSPITALIST

## 2021-04-25 PROCEDURE — 82805 BLOOD GASES W/O2 SATURATION: CPT | Performed by: HOSPITALIST

## 2021-04-25 PROCEDURE — 258N000003 HC RX IP 258 OP 636: Performed by: HOSPITALIST

## 2021-04-25 PROCEDURE — 999N000190 HC STATISTIC VAT ROUNDS

## 2021-04-25 PROCEDURE — 80053 COMPREHEN METABOLIC PANEL: CPT | Performed by: HOSPITALIST

## 2021-04-25 PROCEDURE — 250N000009 HC RX 250: Performed by: HOSPITALIST

## 2021-04-25 PROCEDURE — 120N000013 HC R&B IMCU

## 2021-04-25 PROCEDURE — 94660 CPAP INITIATION&MGMT: CPT

## 2021-04-25 PROCEDURE — 250N000011 HC RX IP 250 OP 636: Performed by: INTERNAL MEDICINE

## 2021-04-25 PROCEDURE — 84145 PROCALCITONIN (PCT): CPT | Performed by: HOSPITALIST

## 2021-04-25 PROCEDURE — 83735 ASSAY OF MAGNESIUM: CPT | Performed by: HOSPITALIST

## 2021-04-25 PROCEDURE — 84100 ASSAY OF PHOSPHORUS: CPT | Performed by: HOSPITALIST

## 2021-04-25 PROCEDURE — 83605 ASSAY OF LACTIC ACID: CPT | Performed by: HOSPITALIST

## 2021-04-25 PROCEDURE — 250N000011 HC RX IP 250 OP 636: Performed by: NURSE PRACTITIONER

## 2021-04-25 PROCEDURE — 999N001017 HC STATISTIC GLUCOSE BY METER IP

## 2021-04-25 RX ORDER — POLYETHYLENE GLYCOL 3350 17 G/17G
17 POWDER, FOR SOLUTION ORAL DAILY PRN
Status: ON HOLD | DISCHARGE
Start: 2021-04-25 | End: 2021-05-15

## 2021-04-25 RX ORDER — ACYCLOVIR 50 MG/G
OINTMENT TOPICAL
Status: ON HOLD | DISCHARGE
Start: 2021-04-25 | End: 2021-05-15

## 2021-04-25 RX ORDER — SIMETHICONE 40MG/0.6ML
40 SUSPENSION, DROPS(FINAL DOSAGE FORM)(ML) ORAL EVERY 6 HOURS PRN
Status: ON HOLD | DISCHARGE
Start: 2021-04-25 | End: 2021-07-15

## 2021-04-25 RX ORDER — POLYETHYLENE GLYCOL 3350 17 G/17G
17 POWDER, FOR SOLUTION ORAL DAILY
Status: ON HOLD | DISCHARGE
Start: 2021-04-26 | End: 2021-05-15

## 2021-04-25 RX ORDER — LEVETIRACETAM 250 MG/1
250 TABLET ORAL 2 TIMES DAILY
Status: ON HOLD | DISCHARGE
Start: 2021-04-26 | End: 2021-05-15

## 2021-04-25 RX ORDER — LACTULOSE 10 G/15ML
3 SOLUTION ORAL 2 TIMES DAILY
Status: DISCONTINUED | OUTPATIENT
Start: 2021-04-25 | End: 2021-04-26 | Stop reason: HOSPADM

## 2021-04-25 RX ORDER — MESALAMINE 1000 MG/1
1000 SUPPOSITORY RECTAL AT BEDTIME
Status: ON HOLD | DISCHARGE
Start: 2021-04-25 | End: 2021-05-26

## 2021-04-25 RX ORDER — CARBOXYMETHYLCELLULOSE SODIUM 5 MG/ML
1 SOLUTION/ DROPS OPHTHALMIC
Status: DISCONTINUED | OUTPATIENT
Start: 2021-04-25 | End: 2021-04-26 | Stop reason: HOSPADM

## 2021-04-25 RX ORDER — AMLODIPINE BESYLATE 10 MG/1
10 TABLET ORAL DAILY
Status: DISCONTINUED | OUTPATIENT
Start: 2021-04-25 | End: 2021-04-26 | Stop reason: HOSPADM

## 2021-04-25 RX ORDER — CARBOXYMETHYLCELLULOSE SODIUM 5 MG/ML
1 SOLUTION/ DROPS OPHTHALMIC
Status: ON HOLD | DISCHARGE
Start: 2021-04-25 | End: 2021-07-15

## 2021-04-25 RX ORDER — LACTULOSE 10 G/15ML
3 SOLUTION ORAL 2 TIMES DAILY
Status: ON HOLD | DISCHARGE
Start: 2021-04-25 | End: 2021-05-15

## 2021-04-25 RX ORDER — METHYLPREDNISOLONE SODIUM SUCCINATE 125 MG/2ML
60 INJECTION, POWDER, LYOPHILIZED, FOR SOLUTION INTRAMUSCULAR; INTRAVENOUS EVERY 12 HOURS
Status: ON HOLD | DISCHARGE
Start: 2021-04-26 | End: 2021-05-15

## 2021-04-25 RX ORDER — BISACODYL 10 MG
10 SUPPOSITORY, RECTAL RECTAL DAILY PRN
Status: ON HOLD | DISCHARGE
Start: 2021-04-25 | End: 2021-07-15

## 2021-04-25 RX ADMIN — METOPROLOL TARTRATE 2.5 MG: 1 INJECTION, SOLUTION INTRAVENOUS at 20:14

## 2021-04-25 RX ADMIN — LACTULOSE 3 G: 20 SOLUTION ORAL at 20:14

## 2021-04-25 RX ADMIN — METHYLPREDNISOLONE SODIUM SUCCINATE 62.5 MG: 125 INJECTION, POWDER, FOR SOLUTION INTRAMUSCULAR; INTRAVENOUS at 04:49

## 2021-04-25 RX ADMIN — BUSPIRONE HYDROCHLORIDE 30 MG: 15 TABLET ORAL at 08:22

## 2021-04-25 RX ADMIN — MESALAMINE 1000 MG: 1000 SUPPOSITORY RECTAL at 22:40

## 2021-04-25 RX ADMIN — POLYETHYLENE GLYCOL 3350 17 G: 17 POWDER, FOR SOLUTION ORAL at 08:23

## 2021-04-25 RX ADMIN — ACYCLOVIR: 50 OINTMENT TOPICAL at 12:22

## 2021-04-25 RX ADMIN — ACETAMINOPHEN 650 MG: 325 TABLET, FILM COATED ORAL at 12:16

## 2021-04-25 RX ADMIN — ACYCLOVIR: 50 OINTMENT TOPICAL at 06:28

## 2021-04-25 RX ADMIN — HYDROMORPHONE HYDROCHLORIDE 0.3 MG: 0.2 INJECTION, SOLUTION INTRAMUSCULAR; INTRAVENOUS; SUBCUTANEOUS at 13:04

## 2021-04-25 RX ADMIN — I.V. FAT EMULSION 250 ML: 20 EMULSION INTRAVENOUS at 20:58

## 2021-04-25 RX ADMIN — SULFAMETHOXAZOLE AND TRIMETHOPRIM 320 MG: 80; 16 INJECTION, SOLUTION, CONCENTRATE INTRAVENOUS at 08:23

## 2021-04-25 RX ADMIN — PANTOPRAZOLE SODIUM 40 MG: 40 INJECTION, POWDER, FOR SOLUTION INTRAVENOUS at 17:21

## 2021-04-25 RX ADMIN — ACYCLOVIR: 50 OINTMENT TOPICAL at 00:16

## 2021-04-25 RX ADMIN — AMLODIPINE BESYLATE 10 MG: 10 TABLET ORAL at 10:16

## 2021-04-25 RX ADMIN — HYDROXYZINE HYDROCHLORIDE 50 MG: 25 TABLET, FILM COATED ORAL at 20:13

## 2021-04-25 RX ADMIN — ACYCLOVIR: 50 OINTMENT TOPICAL at 18:02

## 2021-04-25 RX ADMIN — LACTULOSE 3 G: 20 SOLUTION ORAL at 12:15

## 2021-04-25 RX ADMIN — POTASSIUM CHLORIDE: 2 INJECTION, SOLUTION, CONCENTRATE INTRAVENOUS at 20:58

## 2021-04-25 RX ADMIN — SENNOSIDES AND DOCUSATE SODIUM 2 TABLET: 8.6; 5 TABLET ORAL at 08:22

## 2021-04-25 RX ADMIN — BUSPIRONE HYDROCHLORIDE 30 MG: 15 TABLET ORAL at 20:13

## 2021-04-25 RX ADMIN — ACYCLOVIR: 50 OINTMENT TOPICAL at 20:30

## 2021-04-25 RX ADMIN — ACYCLOVIR: 50 OINTMENT TOPICAL at 04:49

## 2021-04-25 RX ADMIN — SULFAMETHOXAZOLE AND TRIMETHOPRIM 320 MG: 80; 16 INJECTION, SOLUTION, CONCENTRATE INTRAVENOUS at 20:33

## 2021-04-25 RX ADMIN — LORAZEPAM 0.5 MG: 2 INJECTION INTRAMUSCULAR; INTRAVENOUS at 12:05

## 2021-04-25 RX ADMIN — ACETAMINOPHEN 650 MG: 325 TABLET, FILM COATED ORAL at 20:13

## 2021-04-25 RX ADMIN — LORAZEPAM 0.5 MG: 2 INJECTION INTRAMUSCULAR; INTRAVENOUS at 17:21

## 2021-04-25 RX ADMIN — METOPROLOL TARTRATE 2.5 MG: 1 INJECTION, SOLUTION INTRAVENOUS at 15:24

## 2021-04-25 RX ADMIN — METHYLPREDNISOLONE SODIUM SUCCINATE 62.5 MG: 125 INJECTION, POWDER, FOR SOLUTION INTRAMUSCULAR; INTRAVENOUS at 15:50

## 2021-04-25 RX ADMIN — METOPROLOL TARTRATE 2.5 MG: 1 INJECTION, SOLUTION INTRAVENOUS at 02:26

## 2021-04-25 RX ADMIN — ENOXAPARIN SODIUM 40 MG: 40 INJECTION SUBCUTANEOUS at 15:24

## 2021-04-25 RX ADMIN — ACYCLOVIR: 50 OINTMENT TOPICAL at 15:57

## 2021-04-25 RX ADMIN — SIMETHICONE 40 MG: 20 EMULSION ORAL at 20:13

## 2021-04-25 RX ADMIN — LEVETIRACETAM 250 MG: 100 INJECTION, SOLUTION INTRAVENOUS at 20:14

## 2021-04-25 RX ADMIN — SODIUM PHOSPHATE 1 ENEMA: 7; 19 ENEMA RECTAL at 10:09

## 2021-04-25 RX ADMIN — LINACLOTIDE 290 MCG: 290 CAPSULE, GELATIN COATED ORAL at 06:27

## 2021-04-25 RX ADMIN — LEVETIRACETAM 250 MG: 100 INJECTION, SOLUTION INTRAVENOUS at 08:19

## 2021-04-25 RX ADMIN — ACYCLOVIR: 50 OINTMENT TOPICAL at 08:23

## 2021-04-25 RX ADMIN — METOPROLOL TARTRATE 2.5 MG: 1 INJECTION, SOLUTION INTRAVENOUS at 08:22

## 2021-04-25 RX ADMIN — PANTOPRAZOLE SODIUM 40 MG: 40 INJECTION, POWDER, FOR SOLUTION INTRAVENOUS at 08:22

## 2021-04-25 RX ADMIN — FLUOXETINE HYDROCHLORIDE 80 MG: 20 LIQUID ORAL at 08:23

## 2021-04-25 RX ADMIN — SENNOSIDES AND DOCUSATE SODIUM 2 TABLET: 8.6; 5 TABLET ORAL at 20:13

## 2021-04-25 ASSESSMENT — MIFFLIN-ST. JEOR: SCORE: 1139.13

## 2021-04-25 ASSESSMENT — ACTIVITIES OF DAILY LIVING (ADL)
ADLS_ACUITY_SCORE: 23
ADLS_ACUITY_SCORE: 23
ADLS_ACUITY_SCORE: 22
ADLS_ACUITY_SCORE: 20
ADLS_ACUITY_SCORE: 22
ADLS_ACUITY_SCORE: 23

## 2021-04-25 NOTE — PROVIDER NOTIFICATION
MD Notification    Notified Person: MD    Notified Person Name: Dr. Hodge    Notification Date/Time: 4/25/21    Notification Interaction: Text paged    Purpose of Notification: Fleet enema unsuccessful this AM, still want Tap enema?    Orders Received:    Comments:

## 2021-04-25 NOTE — PLAN OF CARE
IMC status. A&Ox3, disoriented to time at times. Lethargic at times. VSS ex HTN at times, scheduled meds given and on High Flow, placed on BIPAP for couple hours overnight. Tele: SR. Up with A2 Lift. Turn/Repo q2hrs. Full liquid, nectar thick diet, encourage oral intake. Pills crushed in pudding. TPN infusing @45mL/hr with lipids. BG checks q4hrs: 138, 145 & 110. C/o minimal abdominal pain, managed with PRN Tylenol, effective. BS active. Abdomen distended with bruising. Simethicone ordered and given. Constipated, suppository given, with no results.  Incontinent B/B, purewick in place. Mepilex to coccyx for prevention. PICC to [facility]. Discharge pending, Waiting for bed at [facility] to open for transfer. Daughter called earlier in night and update given. Continue to monitor.

## 2021-04-25 NOTE — PLAN OF CARE
SLP: Patient on Bi-pap and not appropriate for treatment. Will reschedule for 4/26/21. Given extensive infiltrates and high oxygen needs would hold any oral PO intake.

## 2021-04-25 NOTE — PROGRESS NOTES
[facility]  Gastroenterology Progress Note     Olga Bailey MRN# [id number]   YOB: 1945 Age: 75 year old          Assessment and Plan:     Pneumonia due to 2019 novel coronavirus    Essential hypertension    Glioblastoma -- S/P Surg Resection 2/23/21    Hypoxia    Acute respiratory failure with hypoxia (H)    Thrombocytopenia (H)    Other impaction of intestine (H)    Gastroesophageal reflux disease without esophagitis    Hyponatremia    Pneumocystosis pneumonia (H)    Other dysphagia    Dry eyes    Primary HSV infection of mouth  Patient is overall fairly unchanged patient is still on BiPAP patient is complaining of abdominal discomfort patient had attempted Fleet enema earlier today which was unsuccessful patient has fairly hard stool according to nurse.  I will recommend the patient to have large tapwater enema to soften stools and also for increased volume I will also recommend her to be started on lactulose by mouth along with Linzess.  If patient does not respond other options will include manual disimpaction and possible NG tube placement and GoLYTELY prep with the NG tube placement.  GI will continue to follow along closely            Pneumonia due to 2019 novel coronavirus  Hypoxia  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, unspecified part of lung (H)  Steroid-induced hyperglycemia  Essential hypertension  Acute deep vein thrombosis (DVT) of tibial vein of both lower extremities (H)  Anxiety  Other impaction of intestine (H)  Gastroesophageal reflux disease without esophagitis  Glioblastoma (H)  Hyponatremia  Other dysphagia  Dry eyes  Primary HSV infection of mouth      Interval History:   doing well; no cp, sob, n/v/d, or abd pain.              Review of Systems:   C: NEGATIVE for fever, chills, change in weight  E/M: NEGATIVE for ear, mouth and throat problems  R: NEGATIVE for significant cough or SOB  CV: NEGATIVE for chest pain, palpitations  or peripheral edema             Medications:   I have reviewed this patient's current medications    acyclovir   Topical Q3H     amLODIPine  10 mg Oral Daily     busPIRone HCl  30 mg Oral BID     enoxaparin ANTICOAGULANT  40 mg Subcutaneous Q24H     FLUoxetine  80 mg Oral Daily     [Held by provider] furosemide  40 mg Intravenous Once     insulin aspart  1-12 Units Subcutaneous Q4H     lactulose  3 g Oral BID     levETIRAcetam  250 mg Intravenous Q12H     [Held by provider] levETIRAcetam  250 mg Oral BID     linaclotide  290 mcg Oral QAM AC     lipids  250 mL Intravenous Q24H     mesalamine  1,000 mg Rectal At Bedtime     methylPREDNISolone  62.5 mg Intravenous Q12H     metoprolol  2.5 mg Intravenous Q6H     [Held by provider] metoprolol tartrate  12.5 mg Oral BID     [Held by provider] pantoprazole  40 mg Oral BID AC     pantoprazole (PROTONIX) IV  40 mg Intravenous BID w/meals     polyethylene glycol  17 g Oral Daily     senna-docusate  2 tablet Oral BID    Or     senna-docusate  2 tablet Oral BID     sulfamethoxazole-trimethoprim  320 mg Intravenous Q12H                  Physical Exam:   Vitals were reviewed  Vital Signs with Ranges  Temp:  [97.6  F (36.4  C)-98.8  F (37.1  C)] 98.8  F (37.1  C)  Pulse:  [] 86  Resp:  [19-57] 24  BP: (114-155)/() 149/104  FiO2 (%):  [38 %-60 %] 38 %  SpO2:  [84 %-99 %] 94 %  I/O last 3 completed shifts:  In: 3579.4 [P.O.:90; I.V.:730]  Out: 650 [Urine:650]  Constitutional: healthy, alert and no distress   Cardiovascular: negative, PMI normal. No lifts, heaves, or thrills. RRR. No murmurs, clicks gallops or rub  Respiratory: negative, Percussion normal. Good diaphragmatic excursion. Lungs clear  Head: Normocephalic. No masses, lesions, tenderness or abnormalities  Abdomen: Abdomen soft, non-tender. BS normal. No masses, organomegaly  SKIN: no suspicious lesions or rashes           Data:   I reviewed the patient's new clinical lab test results.   Recent Labs   Lab Test  04/25/21  0625 04/24/21  0635 04/23/21  0550 04/16/21  0815 04/16/21  0815 04/05/21  0640 04/05/21  0640 04/02/21  0550 04/02/21  0550   WBC 13.6* 15.2* 13.3*   < >  --    < > 15.4*   < > 10.9   HGB 9.4* 10.1* 9.8*   < >  --    < > 9.1*   < > 9.9*   MCV 90 88 87   < >  --    < > 88   < > 88    247 222   < >  --    < > 214   < > 206   INR  --   --   --   --  1.18*  --  1.02  --  1.00    < > = values in this interval not displayed.     Recent Labs   Lab Test 04/25/21  0805 04/25/21 0625 04/24/21  0635   POTASSIUM 4.0 Canceled, Test credited, specimen discarded 3.6   CHLORIDE 97 Canceled, Test credited, specimen discarded 97   CO2 27 Canceled, Test credited, specimen discarded 29   BUN 23 Canceled, Test credited, specimen discarded 27   ANIONGAP 6 Canceled, Test credited, specimen discarded 5     Recent Labs   Lab Test 04/25/21  0805 04/25/21 0625 04/24/21  0635 04/14/21 2000 04/14/21 2000 04/02/21  1022 04/02/21  1022 02/17/21  1408 02/17/21  1408   ALBUMIN 2.5* Canceled, Test credited, specimen discarded 2.6*   < >  --    < >  --    < >  --    BILITOTAL 0.8 Canceled, Test credited, specimen discarded 1.3   < >  --    < >  --    < >  --    * Canceled, Test credited, specimen discarded 161*   < >  --    < >  --    < >  --    AST 44 Canceled, Test credited, specimen discarded 37   < >  --    < >  --    < >  --    PROTEIN  --   --   --   --  10*  --  Negative  --  Negative    < > = values in this interval not displayed.       I reviewed the patient's new imaging results.    All laboratory data reviewed  All imaging studies reviewed by me.    Jesús Hodge MD,  4/25/2021  [facility]  Office : [phone]  Cell: [phone]

## 2021-04-25 NOTE — PLAN OF CARE
PT:  Attempted to see x 2 in AM.  Nursing reporting patient has been trying to use bedpan.  States continued SOB.  Nursing plans to use lift to get patient to chair.  Defer PT this date.

## 2021-04-25 NOTE — PROVIDER NOTIFICATION
"MD Notification    Notified Person: MD    Notified Person Name: David Hendricks    Notification Date/Time: 4/24 @2121    Notification Interaction: Amcom text page    Purpose of Notification: Wondering if we can get some simethicone ordered? Per Dr. Bernabe's note said would add med, but medication never ordered.     Orders Received: Simethicone ordered PRN q6hrs    Comments: Daughter called and update give. Daughter wondering if simethicone ordered and given? Daughter stated, \" if could get ordered that would be great to make my mom more comfortable, if not I can always talk to Dr. Bernabe tomorrow.\"      "

## 2021-04-25 NOTE — PROGRESS NOTES
[facility]    Medicine Progress Note - Hospitalist Service       Date of Admission:  3/26/2021  Assessment & Plan       75 year old female admitted on 3/26/2021.  Past medical history that is most notable for recent craniotomy and resection of glioblastoma multiforme, as well as uncomplicated asthma, who presents with dyspnea and is found to have acute bilateral pneumonia.     Acute hypoxic respiratory failure due to bilateral pneumonia, suspected PCP  - discussed with ID, all consistent with PCP  - Bactrim DS 2 tid would've been completed 4/19 (21 of 21) however given increased O2 needs will continue BID bactrim - defer to ID whether broadened coverage to be considered  - 4/21 - 4/22 - continue increased steroids with solumedrol (per pulm)  - continue bipap as needed, HFNC in between as tolerated   - CT PE no clot, bilat infiltrates were seen  - Pulmonology and ID following  - 4/21 - 4/25 continues to have some stability/improvement and has been able to tolerate high flow nasal cannula during the daytime which has been allowing her to have oral meds and some p.o. intake.  Bactrim continued ID plans appreciated  - possible upcoming decrease.  - 4/23 solumedrol 60 q 12h - discussed with Pulmonology - will continue this regimen through 4/26, consider further decrease as she hopefully improves  - Repeat Covid 4/22 - NEGATIVE  - Goal O2 sat 90% or more  - encourage OOB and activity with RN and therapy     Ileus / constipation  Likely in part due to recent bleed and hematoma.   - TPN as above, encourage PO intake as tolerated  - 4/23 CT a/p updated due to distension and continued discomfort - stercoral colitis considered, no acute abdomen, but significant stool burden noted in colon and rectum  - Appreciate ongoing assistance from Dr Hodge of GI - linzess, fleets enema, water enema, lactulose, and manual disimpaction attempted with only minimal response thus far. Can consider NG with golytely  if no results soon.   - OOB and encourage activity     Retroperitoneal bleed 2nd to Lovenox 4/14/2021 AM - resolving  Chronic anemia of chronic disease, with Acute blood loss  Baseline hgb 10-11 g/dL, stable around 8 - 9   - Hgb stable - 9.4 <-- 10.1 <--9.8 <--  9.4 <-- 8.9 <-- 8.8 <-- 8.5 <-- 8.1 <-- 8.6)  - 4/23 due to abdo pain and distension, CT a/p with con was completed - stable/improving hematomas noted                Increased SOB, suspect mild fluid overload (~4/17) -- increased wt 2nd to blood and IV fluids and TPN  - CXR 4/18 with no new infiltrates  - follow I/O and daily weights though they are difficult to trust given everything on bed etc  - IV lasix 40 bid continued through 4/22  - IV lasix 40 daily starting 4/23 and continued - weight down to 149 4/24     GBM s/p resection 2/23/21  Followed by Dr. Baker of Neuro-Oncology.  She is in process of being set up to start chemotherapy and XRT possibly this Week  - has not yet initiated chemo (temozolomide) and planned radiation  - Family agrees on 2nd opinion with [facility] concerning GBM treatment, [place] requested Dr. Stephanie Baker consult [facility] to initiate process (the Patient's Ex-, Dr. Renaldo Dow, is eager to hear what they recommend)  - will attempt to contact Dr. Baker regarding any possible option for treatment, ex-  - TRANSFER PLANS -  - keppra continued - defer this regimen, but previously plan was for 1 week bid starting 4/19 - would greatly appreciate Neuro-Onc update on regimen going forward  - 4/23 - 4/24- see prior note for details  - 4/25 - Contacted SOC - waiting list for [facility] bed, possible transfer to [facility]; confirmed with patient and daughter that transfer is desired. Orders placed, EMTALA signed, doc to doc phone call completed.   - ideally, while her respiratory failure treatment improves and stool burden alleviated, brain MRI and plans for radiation could be pursued. I obviously defer this to the  specialists.   - 4/25 confirmed with patient that she is still okay with current plan of care and code status but I reiterated to her and daughter that it should be revisited frequently to ensure patient's goals are followed.     Bilateral Lower Leg DVT on 3/29, hypercoagulable 2nd to GBM  - off Lovenox (last dose 4/14 AM) -- IR placed IVC filter 4/16  - 4/22 will restart Lovenox 40 mg subcutaneous as hgb stable and she is at high risk of further VTE    Non-severe malnutrition  Noted decrease oral intake and drop in albumin, subcutaneous fat loss on exam.   - PICC line in place  - previously with excellent oral intake, tapered off TPN 4/9, but restarted 4/18 as above  - 4/22 TPN to increase to 45/hr, continue PO intake noted.   - Appreciate dietician assistance, discussed plan.    Shock Liver with Elevated LFT's   - related to Retroperitoneal Bleed, resolving     Thrombocytopenia -- suspect consumption from bleeding  - follow CBC - stable/improved 4/20 - 102k --> 139k 4/21 --> 194k 4/22 --> 222k 4/23 --> 247k 4/24     Leukocytosis - likely stress and steroid related, afebrile                Epistaxis  - now stable  - monitor with resumption of lovenox 40 daily     Hyponatremia  - mild, 2nd to SIADH from stress and pulm process  - stable, low 130s primarily     Steroid induced hyperglycemia -- resolved  - restart insulin as need     Urinary incontinence -- has pure wick     Hypokalemia, variable - replacement protocol     Hypertension  - continue PTA amlodipine when taking PO (on bipap)     Sinus tachycardia -- clinically improved  - metoprolol 12.5 mg bid po or IV 2.5 q 6 given npo     Asthma  MARCELLE  - nebs prn  - resume CPAP with home setting 4/9     Depression and anxiety  Insomnia  - continue PTA Prozac and Buspar   - continue PTA Atarax for sleep  - prn lorazepam     GERD  - switch to PPI as above         Diet: Room Service  Snacks/Supplements Adult: Other; Chocolate Magic Shake (RD); With Meals  parenteral  nutrition - ADULT compounded formula  parenteral nutrition - ADULT compounded formula  Advance Diet as Tolerated  Full Liquid Diet Nectar Thickened Liquids (pre-thickened or use instant food thickener)    DVT Prophylaxis: Enoxaparin (Lovenox) SQ  Martino Catheter: not present  Code Status: Full Code           Disposition Plan   Expected discharge: possibly to the [facility] tonight-orders placed, EMTALA signed, and signout provided to accepting physician  Entered: Duke Bernabe MD 04/25/2021, 6:26 PM       The patient's care was discussed with the Bedside Nurse, Care Coordinator/, Patient, Patient's Family, GI Consultant and Pulmonology Consultant, RN supervisor, and accepting physician at [facility].    Duke Bernabe MD  Hospitalist Service  [facility]  Contact information available via [facility] Paging/Directory    ______________________________________________________________________    Interval History   Patient seen and examined this morning in late afternoon.  Continued communication with patient's daughter via phone and in person.  Patient continues to struggle with considerable stool burden although we have had minimal success with aggressive stool softeners, laxatives, suppository and enema.  Appreciate Dr. Hodge's assistance -Linzess and Canasa suppository in place.  Also can/will start lactulose and if needed can consider NG placement and use of GoLYTELY.  Patient continues to have abdominal distention and discomfort.  Overall respiratory status is fairly unchanged-seems she was possibly anxious and therefore briefly on BiPAP earlier today subsequently in the afternoon O2 saturations in mid 90s on 40% FiO2 via high flow nasal cannula.  She reports breathing is a bit better.  No fevers or chills.  ABG very normal.    We will plan on continuing steroid and Bactrim as above [facility].  This can  hopefully expedite GBM treatments which will not be limited by timing if she was at TCU there instead of outside TCU where coverage may not continue.  Confirmed with patient and family that they are in agreement with transfer plan.    Data reviewed today: I reviewed all medications, new labs and imaging results over the last 24 hours. I personally reviewed no images or EKG's today.    Physical Exam   Vital Signs: Temp: 98.8  F (37.1  C) Temp src: Axillary BP: (!) 138/94 Pulse: 98   Resp: 27 SpO2: 97 % O2 Device: High Flow Nasal Cannula (HFNC) Oxygen Delivery: 30 LPM  Weight: 148 lbs 12.97 oz    Gen: NAD, extremely pleasant, fatigued in general and ill  HEENT: Normocephalic, EOMI, MMM  Resp: no focal crackles,  no wheezes, no increased work of resp  CV: S1S2 heard, reg rhythm, reg rate, no pitting pedal edema  Abdo: soft, mild distension, mildly tender in general to palp, no rebound, BS heard; ecchymoses noted and not expanding  Ext: calves nontender, well perfused  Neuro: AAOx3, CN grossly intact, no facial asymmetry      Data   Recent Labs   Lab 04/25/21  0805 04/25/21  0625 04/24/21  0635 04/23/21  0550   WBC  --  13.6* 15.2* 13.3*   HGB  --  9.4* 10.1* 9.8*   MCV  --  90 88 87   PLT  --  231 247 222   * Canceled, Test credited, specimen discarded 131* 131*   POTASSIUM 4.0 Canceled, Test credited, specimen discarded 3.6 3.5   CHLORIDE 97 Canceled, Test credited, specimen discarded 97 95   CO2 27 Canceled, Test credited, specimen discarded 29 30   BUN 23 Canceled, Test credited, specimen discarded 27 26   CR 0.45* Canceled, Test credited, specimen discarded 0.45* 0.46*   ANIONGAP 6 Canceled, Test credited, specimen discarded 5 6   ESTIVEN 8.6 Canceled, Test credited, specimen discarded 9.0 9.2   * Canceled, Test credited, specimen discarded 137* 154*   ALBUMIN 2.5* Canceled, Test credited, specimen discarded 2.6*  --    PROTTOTAL 5.7* Canceled, Test credited, specimen discarded 6.0*  --    BILITOTAL 0.8  Canceled, Test credited, specimen discarded 1.3  --    ALKPHOS 127 Canceled, Test credited, specimen discarded 120  --    * Canceled, Test credited, specimen discarded 161*  --    AST 44 Canceled, Test credited, specimen discarded 37  --      No results found for this or any previous visit (from the past 24 hour(s)).

## 2021-04-25 NOTE — PROGRESS NOTES
"PULMONOLOGY PROGRESS NOTE    Date of Admission: 3/26/2021    CC/Reason for Hospital visit: Acute hypoxic respiratory failure, suspected PJP  SUBJECTIVE      Events of last night reviewed. On BiPAP today, with increased respiratory rate on high flow oxygen earlier.  Appears comfortable on BiPAP.    ROS: A Problem Pertinent review of systems was negative except for items noted in HPI.  Past Medical, Family, and Social/Substance History has been reviewed: No interval changes.    OBJECTIVE   Vital signs:  Temp: 98.1  F (36.7  C) Temp src: Axillary BP: 132/75 Pulse: 93   Resp: 24 SpO2: 99 % O2 Device: BiPAP/CPAP Oxygen Delivery: 40 LPM, 48% FIO2 Height: 160 cm (5' 3\") Weight: 67.5 kg (148 lb 13 oz)  Estimated body mass index is 26.36 kg/m  as calculated from the following:    Height as of this encounter: 1.6 m (5' 3\").    Weight as of this encounter: 67.5 kg (148 lb 13 oz).        I/O last 3 completed shifts:  In: 400 [P.O.:370; I.V.:30]  Out: 650 [Urine:650]      CONSTITUTIONAL/GENERAL APPEARANCE: Aler female. No Apparent Distress.  PSYCHIATRIC: Pleasant and appropriate mood and affect. Oriented x 3.  EARS, NOSE,THROAT,MOUTH: External ears and nose overall normal. Normal oral mucosa.   NECK: Neck appearance normal. No neck masses and the thyroid is not enlarged.   RESPIRATORY: Non-labored effort. Decreased BS, scattered crackles posteriorly.  No wheezes.  CARDIOVASCULAR: S1, S2, regular rate and rhythm.    LABORATORY ASSESSMENT    Arterial Blood Gas  Recent Labs   Lab 04/25/21  1035 04/19/21  1150   PH 7.42 7.51*   PCO2 38 38   PO2 92 65*   HCO3 24 30*   O2PER  --  60     CBC  Recent Labs   Lab 04/25/21  0625 04/24/21  0635 04/23/21  0550 04/22/21  0540   WBC 13.6* 15.2* 13.3* 15.7*   RBC 3.06* 3.54* 3.41* 3.27*   HGB 9.4* 10.1* 9.8* 9.4*   HCT 27.6* 31.0* 29.7* 28.0*   MCV 90 88 87 86   MCH 30.7 28.5 28.7 28.7   MCHC 34.1 32.6 33.0 33.6   RDW 16.7* 16.4* 16.6* 16.8*    247 222 194     [facility]  Recent Labs   Lab " 04/25/21  0805 04/25/21  0625 04/24/21  0635 04/23/21  0550   * Canceled, Test credited, specimen discarded 131* 131*   POTASSIUM 4.0 Canceled, Test credited, specimen discarded 3.6 3.5   CHLORIDE 97 Canceled, Test credited, specimen discarded 97 95   ESTIVEN 8.6 Canceled, Test credited, specimen discarded 9.0 9.2   CO2 27 Canceled, Test credited, specimen discarded 29 30   BUN 23 Canceled, Test credited, specimen discarded 27 26   CR 0.45* Canceled, Test credited, specimen discarded 0.45* 0.46*   * Canceled, Test credited, specimen discarded 137* 154*     INR  No lab results found in last 7 days.   BNPNo lab results found in last 7 days.  VENOUS BLOOD GASESNo lab results found in last 7 days.    Additional labs and/or comments:      IMAGING      CT Chest 4/19 -  IMPRESSION:  1.  No pulmonary embolism demonstrated.  2.  Extensive bilateral pulmonary infiltrates. These are increased  from previous considerably.    CXR 4/19 -  IMPRESSION: Right PICC line tip in cavoatrial region. Shallow inspiration. Stable cardia mediastinal silhouette. Bilateral infiltrates.    CXR 4/17 -  IMPRESSION: Moderately extensive interstitial and airspace infiltrates  bilaterally similar to slightly worse than previous. Right-sided PICC  line tip in the low SVC.    PFT & OTHER TESTING     Echo 4/20 -  Interpretation Summary  Left ventricular systolic function is normal.The visual ejection fraction is  estimated at 55-60%.  The right ventricular systolic function is normal.  There is mild (1+) tricuspid regurgitation.    ASSESSMENT / PLAN      Pulmonary Diagnoses:  Abnl CT/CXR R91.8  Asthma unspec J45.909  Hypoxemia R09.02  Pneumonia unspec J18.9  Resp fail acute J96.00    Additional COVID-19 diagnoses:  Concern of possible exposure to COVID-19, Now RULED OUT Z03.818    ASSESSMENT: 75-year-old female with a history of asthma and obstructive sleep apnea on CPAP, recent diagnosis of glioblastoma status post resection 2/23/21 admitted  with progressive dyspnea. Admission CT Chest showed bilateral patchy groundglass opacities, negative for PE.  Initially treated with ceftriaxone and doxy. COVID19 negative 3/26 and 3/29.  Increased oxygen needs 3/29, ultimately requiring BiPAP, with repeat CXR showing increase in bilateral patchy opacities.  Found to have DVT 3/29.  ID consulted 3/29, high suspicion for PJP pneumonia (LDH high, high beta d glucan), unable to confirm with the sputum studies as patient has been unable to provide any sputum.  PJP suspected secondary to prolonged steroid course following neurosurgery, switched from ceftriaxone to zosyn 3/29 and initiated on high dose Bactrim and increased steroids.  Follow-up CXRs 4/2 and 4/5 show persistent patchy infiltrates bilaterally. Chest x-rays 4/2 and 4/5 showed persistent patchy infiltrates bilaterally.  Recent hospital course notable for retroperitoneal bleed due to anticoagulation, and increased shortness of breath due to volume overload.  Follow-up chest x-rays 4/17 and 4/19 showed increased interstitial infiltrates.  CT scan of the chest 4/19 shows extensive bilateral pulmonary infiltrates which have increased when compared to the prior CT chest 3/26.  Decompensation likely reflects worsening PJP pneumonia versus superimposed acute lung injury.  Patient is not currently a candidate for bronchoscopy due to high FiO2 requirements and need for BiPAP. If diagnostic specimens required, she will either need to transfer to ICU for elective intubation and bronchoscopy by the Critical Care service vs OLBx by Thoracic Surgery. Would continue present rx for now.    PLAN:  1. Adjust oxygen, keep SaO2 > 90%  2. BiPAP prn.  3. Antibiotics - Bactrim  4. Steroids - SoluMedrol q12  5. Diuresis - Lasix as ordered.    Anticipating transfer to the [facility] to start chemotherapy.    Discussed with Dr. Bernabe and nursing.      Manuel Bauer M.D.  [facility]  [facility]  [place]  [phone]  Pager [phone]

## 2021-04-26 ENCOUNTER — APPOINTMENT (OUTPATIENT)
Dept: GENERAL RADIOLOGY | Facility: CLINIC | Age: 76
DRG: 177 | End: 2021-04-26
Attending: STUDENT IN AN ORGANIZED HEALTH CARE EDUCATION/TRAINING PROGRAM
Payer: MEDICARE

## 2021-04-26 ENCOUNTER — APPOINTMENT (OUTPATIENT)
Dept: OCCUPATIONAL THERAPY | Facility: CLINIC | Age: 76
DRG: 166 | End: 2021-04-26
Attending: HOSPITALIST
Payer: MEDICARE

## 2021-04-26 ENCOUNTER — APPOINTMENT (OUTPATIENT)
Dept: MRI IMAGING | Facility: CLINIC | Age: 76
DRG: 177 | End: 2021-04-26
Attending: STUDENT IN AN ORGANIZED HEALTH CARE EDUCATION/TRAINING PROGRAM
Payer: MEDICARE

## 2021-04-26 ENCOUNTER — HOSPITAL ENCOUNTER (INPATIENT)
Facility: CLINIC | Age: 76
LOS: 19 days | Discharge: NURSING, REHAB OR LONG-TERM CARE | DRG: 177 | End: 2021-05-15
Attending: STUDENT IN AN ORGANIZED HEALTH CARE EDUCATION/TRAINING PROGRAM | Admitting: PEDIATRICS
Payer: MEDICARE

## 2021-04-26 ENCOUNTER — APPOINTMENT (OUTPATIENT)
Dept: PHYSICAL THERAPY | Facility: CLINIC | Age: 76
DRG: 166 | End: 2021-04-26
Attending: HOSPITALIST
Payer: MEDICARE

## 2021-04-26 ENCOUNTER — APPOINTMENT (OUTPATIENT)
Dept: SPEECH THERAPY | Facility: CLINIC | Age: 76
DRG: 166 | End: 2021-04-26
Attending: HOSPITALIST
Payer: MEDICARE

## 2021-04-26 VITALS
SYSTOLIC BLOOD PRESSURE: 131 MMHG | BODY MASS INDEX: 29.06 KG/M2 | RESPIRATION RATE: 22 BRPM | DIASTOLIC BLOOD PRESSURE: 80 MMHG | TEMPERATURE: 97.7 F | HEART RATE: 96 BPM | WEIGHT: 164.02 LBS | OXYGEN SATURATION: 92 % | HEIGHT: 63 IN

## 2021-04-26 DIAGNOSIS — F41.9 ANXIETY: ICD-10-CM

## 2021-04-26 DIAGNOSIS — J96.01 ACUTE RESPIRATORY FAILURE WITH HYPOXIA (H): ICD-10-CM

## 2021-04-26 DIAGNOSIS — C71.9 GLIOBLASTOMA (H): ICD-10-CM

## 2021-04-26 DIAGNOSIS — K21.9 GASTROESOPHAGEAL REFLUX DISEASE WITHOUT ESOPHAGITIS: ICD-10-CM

## 2021-04-26 DIAGNOSIS — F33.9 EPISODE OF RECURRENT MAJOR DEPRESSIVE DISORDER, UNSPECIFIED DEPRESSION EPISODE SEVERITY (H): ICD-10-CM

## 2021-04-26 DIAGNOSIS — K59.00 CONSTIPATION, UNSPECIFIED CONSTIPATION TYPE: Primary | ICD-10-CM

## 2021-04-26 DIAGNOSIS — F43.21 ADJUSTMENT DISORDER WITH DEPRESSED MOOD: ICD-10-CM

## 2021-04-26 DIAGNOSIS — R09.02 HYPOXIA: ICD-10-CM

## 2021-04-26 DIAGNOSIS — J44.9 CHRONIC OBSTRUCTIVE PULMONARY DISEASE, UNSPECIFIED COPD TYPE (H): ICD-10-CM

## 2021-04-26 DIAGNOSIS — B59 PNEUMONIA OF BOTH LUNGS DUE TO PNEUMOCYSTIS JIROVECII, UNSPECIFIED PART OF LUNG (H): ICD-10-CM

## 2021-04-26 LAB
ALBUMIN SERPL-MCNC: 2.4 G/DL (ref 3.4–5)
ALBUMIN SERPL-MCNC: 2.4 G/DL (ref 3.4–5)
ALP SERPL-CCNC: 119 U/L (ref 40–150)
ALP SERPL-CCNC: 123 U/L (ref 40–150)
ALT SERPL W P-5'-P-CCNC: 106 U/L (ref 0–50)
ALT SERPL W P-5'-P-CCNC: 107 U/L (ref 0–50)
ANION GAP SERPL CALCULATED.3IONS-SCNC: 5 MMOL/L (ref 3–14)
ANION GAP SERPL CALCULATED.3IONS-SCNC: 6 MMOL/L (ref 3–14)
AST SERPL W P-5'-P-CCNC: 36 U/L (ref 0–45)
AST SERPL W P-5'-P-CCNC: 42 U/L (ref 0–45)
BASE EXCESS BLDV CALC-SCNC: 3.4 MMOL/L
BILIRUB SERPL-MCNC: 0.4 MG/DL (ref 0.2–1.3)
BILIRUB SERPL-MCNC: 0.4 MG/DL (ref 0.2–1.3)
BUN SERPL-MCNC: 18 MG/DL (ref 7–30)
BUN SERPL-MCNC: 19 MG/DL (ref 7–30)
CALCIUM SERPL-MCNC: 8.1 MG/DL (ref 8.5–10.1)
CALCIUM SERPL-MCNC: 8.8 MG/DL (ref 8.5–10.1)
CHLORIDE SERPL-SCNC: 100 MMOL/L (ref 94–109)
CHLORIDE SERPL-SCNC: 98 MMOL/L (ref 94–109)
CO2 SERPL-SCNC: 26 MMOL/L (ref 20–32)
CO2 SERPL-SCNC: 27 MMOL/L (ref 20–32)
CREAT SERPL-MCNC: 0.42 MG/DL (ref 0.52–1.04)
CREAT SERPL-MCNC: 0.42 MG/DL (ref 0.52–1.04)
ERYTHROCYTE [DISTWIDTH] IN BLOOD BY AUTOMATED COUNT: 16.7 % (ref 10–15)
GFR SERPL CREATININE-BSD FRML MDRD: >90 ML/MIN/{1.73_M2}
GFR SERPL CREATININE-BSD FRML MDRD: >90 ML/MIN/{1.73_M2}
GLUCOSE BLDC GLUCOMTR-MCNC: 107 MG/DL (ref 70–99)
GLUCOSE BLDC GLUCOMTR-MCNC: 123 MG/DL (ref 70–99)
GLUCOSE BLDC GLUCOMTR-MCNC: 139 MG/DL (ref 70–99)
GLUCOSE BLDC GLUCOMTR-MCNC: 166 MG/DL (ref 70–99)
GLUCOSE BLDC GLUCOMTR-MCNC: 86 MG/DL (ref 70–99)
GLUCOSE SERPL-MCNC: 129 MG/DL (ref 70–99)
GLUCOSE SERPL-MCNC: 71 MG/DL (ref 70–99)
HCO3 BLDV-SCNC: 28 MMOL/L (ref 21–28)
HCT VFR BLD AUTO: 28.5 % (ref 35–47)
HGB BLD-MCNC: 9.2 G/DL (ref 11.7–15.7)
LABORATORY COMMENT REPORT: NORMAL
LACTATE BLD-SCNC: 1.5 MMOL/L (ref 0.7–2)
MAGNESIUM SERPL-MCNC: 2.1 MG/DL (ref 1.6–2.3)
MAGNESIUM SERPL-MCNC: 2.1 MG/DL (ref 1.6–2.3)
MCH RBC QN AUTO: 28.5 PG (ref 26.5–33)
MCHC RBC AUTO-ENTMCNC: 32.3 G/DL (ref 31.5–36.5)
MCV RBC AUTO: 88 FL (ref 78–100)
O2/TOTAL GAS SETTING VFR VENT: ABNORMAL %
PCO2 BLDV: 40 MM HG (ref 40–50)
PH BLDV: 7.45 PH (ref 7.32–7.43)
PHOSPHATE SERPL-MCNC: 1.7 MG/DL (ref 2.5–4.5)
PHOSPHATE SERPL-MCNC: 2.1 MG/DL (ref 2.5–4.5)
PLATELET # BLD AUTO: 220 10E9/L (ref 150–450)
PO2 BLDV: 31 MM HG (ref 25–47)
POTASSIUM SERPL-SCNC: 3.8 MMOL/L (ref 3.4–5.3)
POTASSIUM SERPL-SCNC: 3.9 MMOL/L (ref 3.4–5.3)
PROT SERPL-MCNC: 5.4 G/DL (ref 6.8–8.8)
PROT SERPL-MCNC: 5.5 G/DL (ref 6.8–8.8)
RBC # BLD AUTO: 3.23 10E12/L (ref 3.8–5.2)
SARS-COV-2 RNA RESP QL NAA+PROBE: NEGATIVE
SODIUM SERPL-SCNC: 130 MMOL/L (ref 133–144)
SODIUM SERPL-SCNC: 131 MMOL/L (ref 133–144)
SPECIMEN SOURCE: NORMAL
WBC # BLD AUTO: 15.8 10E9/L (ref 4–11)

## 2021-04-26 PROCEDURE — 999N000157 HC STATISTIC RCP TIME EA 10 MIN

## 2021-04-26 PROCEDURE — U0005 INFEC AGEN DETEC AMPLI PROBE: HCPCS | Performed by: STUDENT IN AN ORGANIZED HEALTH CARE EDUCATION/TRAINING PROGRAM

## 2021-04-26 PROCEDURE — 74018 RADEX ABDOMEN 1 VIEW: CPT

## 2021-04-26 PROCEDURE — A9585 GADOBUTROL INJECTION: HCPCS | Performed by: STUDENT IN AN ORGANIZED HEALTH CARE EDUCATION/TRAINING PROGRAM

## 2021-04-26 PROCEDURE — 71045 X-RAY EXAM CHEST 1 VIEW: CPT

## 2021-04-26 PROCEDURE — 250N000011 HC RX IP 250 OP 636: Performed by: HOSPITALIST

## 2021-04-26 PROCEDURE — 83605 ASSAY OF LACTIC ACID: CPT | Performed by: STUDENT IN AN ORGANIZED HEALTH CARE EDUCATION/TRAINING PROGRAM

## 2021-04-26 PROCEDURE — 250N000013 HC RX MED GY IP 250 OP 250 PS 637: Performed by: HOSPITALIST

## 2021-04-26 PROCEDURE — 250N000013 HC RX MED GY IP 250 OP 250 PS 637: Performed by: INTERNAL MEDICINE

## 2021-04-26 PROCEDURE — 99223 1ST HOSP IP/OBS HIGH 75: CPT | Mod: AI | Performed by: PEDIATRICS

## 2021-04-26 PROCEDURE — 74018 RADEX ABDOMEN 1 VIEW: CPT | Mod: 26 | Performed by: STUDENT IN AN ORGANIZED HEALTH CARE EDUCATION/TRAINING PROGRAM

## 2021-04-26 PROCEDURE — 250N000009 HC RX 250: Performed by: HOSPITALIST

## 2021-04-26 PROCEDURE — 250N000009 HC RX 250: Performed by: STUDENT IN AN ORGANIZED HEALTH CARE EDUCATION/TRAINING PROGRAM

## 2021-04-26 PROCEDURE — 999N000190 HC STATISTIC VAT ROUNDS

## 2021-04-26 PROCEDURE — 258N000003 HC RX IP 258 OP 636: Performed by: INTERNAL MEDICINE

## 2021-04-26 PROCEDURE — 5A09357 ASSISTANCE WITH RESPIRATORY VENTILATION, LESS THAN 24 CONSECUTIVE HOURS, CONTINUOUS POSITIVE AIRWAY PRESSURE: ICD-10-PCS | Performed by: PEDIATRICS

## 2021-04-26 PROCEDURE — 250N000011 HC RX IP 250 OP 636: Performed by: STUDENT IN AN ORGANIZED HEALTH CARE EDUCATION/TRAINING PROGRAM

## 2021-04-26 PROCEDURE — G1004 CDSM NDSC: HCPCS | Mod: GC | Performed by: RADIOLOGY

## 2021-04-26 PROCEDURE — 250N000009 HC RX 250: Performed by: INTERNAL MEDICINE

## 2021-04-26 PROCEDURE — C9113 INJ PANTOPRAZOLE SODIUM, VIA: HCPCS | Performed by: HOSPITALIST

## 2021-04-26 PROCEDURE — 120N000003 HC R&B IMCU UMMC

## 2021-04-26 PROCEDURE — 83735 ASSAY OF MAGNESIUM: CPT | Performed by: STUDENT IN AN ORGANIZED HEALTH CARE EDUCATION/TRAINING PROGRAM

## 2021-04-26 PROCEDURE — 255N000002 HC RX 255 OP 636: Performed by: STUDENT IN AN ORGANIZED HEALTH CARE EDUCATION/TRAINING PROGRAM

## 2021-04-26 PROCEDURE — 87040 BLOOD CULTURE FOR BACTERIA: CPT | Performed by: STUDENT IN AN ORGANIZED HEALTH CARE EDUCATION/TRAINING PROGRAM

## 2021-04-26 PROCEDURE — 258N000003 HC RX IP 258 OP 636: Performed by: STUDENT IN AN ORGANIZED HEALTH CARE EDUCATION/TRAINING PROGRAM

## 2021-04-26 PROCEDURE — 250N000011 HC RX IP 250 OP 636: Performed by: NURSE PRACTITIONER

## 2021-04-26 PROCEDURE — 80053 COMPREHEN METABOLIC PANEL: CPT | Performed by: STUDENT IN AN ORGANIZED HEALTH CARE EDUCATION/TRAINING PROGRAM

## 2021-04-26 PROCEDURE — 94660 CPAP INITIATION&MGMT: CPT

## 2021-04-26 PROCEDURE — 85027 COMPLETE CBC AUTOMATED: CPT | Performed by: STUDENT IN AN ORGANIZED HEALTH CARE EDUCATION/TRAINING PROGRAM

## 2021-04-26 PROCEDURE — 93005 ELECTROCARDIOGRAM TRACING: CPT

## 2021-04-26 PROCEDURE — 999N001017 HC STATISTIC GLUCOSE BY METER IP

## 2021-04-26 PROCEDURE — 250N000011 HC RX IP 250 OP 636: Performed by: INTERNAL MEDICINE

## 2021-04-26 PROCEDURE — 80053 COMPREHEN METABOLIC PANEL: CPT | Performed by: INTERNAL MEDICINE

## 2021-04-26 PROCEDURE — 97110 THERAPEUTIC EXERCISES: CPT | Mod: GO | Performed by: OCCUPATIONAL THERAPIST

## 2021-04-26 PROCEDURE — 250N000013 HC RX MED GY IP 250 OP 250 PS 637: Performed by: STUDENT IN AN ORGANIZED HEALTH CARE EDUCATION/TRAINING PROGRAM

## 2021-04-26 PROCEDURE — U0003 INFECTIOUS AGENT DETECTION BY NUCLEIC ACID (DNA OR RNA); SEVERE ACUTE RESPIRATORY SYNDROME CORONAVIRUS 2 (SARS-COV-2) (CORONAVIRUS DISEASE [COVID-19]), AMPLIFIED PROBE TECHNIQUE, MAKING USE OF HIGH THROUGHPUT TECHNOLOGIES AS DESCRIBED BY CMS-2020-01-R: HCPCS | Performed by: STUDENT IN AN ORGANIZED HEALTH CARE EDUCATION/TRAINING PROGRAM

## 2021-04-26 PROCEDURE — 82803 BLOOD GASES ANY COMBINATION: CPT | Performed by: STUDENT IN AN ORGANIZED HEALTH CARE EDUCATION/TRAINING PROGRAM

## 2021-04-26 PROCEDURE — 84100 ASSAY OF PHOSPHORUS: CPT | Performed by: STUDENT IN AN ORGANIZED HEALTH CARE EDUCATION/TRAINING PROGRAM

## 2021-04-26 PROCEDURE — 258N000003 HC RX IP 258 OP 636: Performed by: HOSPITALIST

## 2021-04-26 PROCEDURE — 97530 THERAPEUTIC ACTIVITIES: CPT | Mod: GP

## 2021-04-26 PROCEDURE — 71045 X-RAY EXAM CHEST 1 VIEW: CPT | Mod: 26 | Performed by: RADIOLOGY

## 2021-04-26 PROCEDURE — 84100 ASSAY OF PHOSPHORUS: CPT | Performed by: INTERNAL MEDICINE

## 2021-04-26 PROCEDURE — 83735 ASSAY OF MAGNESIUM: CPT | Performed by: INTERNAL MEDICINE

## 2021-04-26 PROCEDURE — 99239 HOSP IP/OBS DSCHRG MGMT >30: CPT | Performed by: INTERNAL MEDICINE

## 2021-04-26 PROCEDURE — 92526 ORAL FUNCTION THERAPY: CPT | Mod: GN | Performed by: SPEECH-LANGUAGE PATHOLOGIST

## 2021-04-26 PROCEDURE — 36592 COLLECT BLOOD FROM PICC: CPT | Performed by: STUDENT IN AN ORGANIZED HEALTH CARE EDUCATION/TRAINING PROGRAM

## 2021-04-26 PROCEDURE — 70552 MRI BRAIN STEM W/DYE: CPT | Mod: MG

## 2021-04-26 PROCEDURE — 93010 ELECTROCARDIOGRAM REPORT: CPT | Performed by: INTERNAL MEDICINE

## 2021-04-26 PROCEDURE — 70552 MRI BRAIN STEM W/DYE: CPT | Mod: 26 | Performed by: RADIOLOGY

## 2021-04-26 RX ORDER — SIMETHICONE 40MG/0.6ML
40 SUSPENSION, DROPS(FINAL DOSAGE FORM)(ML) ORAL EVERY 6 HOURS PRN
Status: DISCONTINUED | OUTPATIENT
Start: 2021-04-26 | End: 2021-05-15 | Stop reason: HOSPADM

## 2021-04-26 RX ORDER — ALBUTEROL SULFATE 90 UG/1
2 AEROSOL, METERED RESPIRATORY (INHALATION) EVERY 4 HOURS PRN
Status: DISCONTINUED | OUTPATIENT
Start: 2021-04-26 | End: 2021-05-15 | Stop reason: HOSPADM

## 2021-04-26 RX ORDER — NALOXONE HYDROCHLORIDE 0.4 MG/ML
0.2 INJECTION, SOLUTION INTRAMUSCULAR; INTRAVENOUS; SUBCUTANEOUS
Status: DISCONTINUED | OUTPATIENT
Start: 2021-04-26 | End: 2021-05-15 | Stop reason: HOSPADM

## 2021-04-26 RX ORDER — BUSPIRONE HYDROCHLORIDE 15 MG/1
30 TABLET ORAL 2 TIMES DAILY
Status: DISCONTINUED | OUTPATIENT
Start: 2021-04-26 | End: 2021-05-15 | Stop reason: HOSPADM

## 2021-04-26 RX ORDER — AMLODIPINE BESYLATE 5 MG/1
10 TABLET ORAL DAILY
Status: DISCONTINUED | OUTPATIENT
Start: 2021-04-27 | End: 2021-05-15 | Stop reason: HOSPADM

## 2021-04-26 RX ORDER — ACYCLOVIR 50 MG/G
OINTMENT TOPICAL
Status: DISCONTINUED | OUTPATIENT
Start: 2021-04-26 | End: 2021-05-13

## 2021-04-26 RX ORDER — ONDANSETRON 2 MG/ML
4 INJECTION INTRAMUSCULAR; INTRAVENOUS EVERY 6 HOURS PRN
Status: DISCONTINUED | OUTPATIENT
Start: 2021-04-26 | End: 2021-04-28

## 2021-04-26 RX ORDER — HYDROMORPHONE HCL IN WATER/PF 6 MG/30 ML
0.2 PATIENT CONTROLLED ANALGESIA SYRINGE INTRAVENOUS
Status: DISCONTINUED | OUTPATIENT
Start: 2021-04-26 | End: 2021-04-27

## 2021-04-26 RX ORDER — OXYCODONE HCL 5 MG/5 ML
5 SOLUTION, ORAL ORAL EVERY 4 HOURS PRN
Status: DISCONTINUED | OUTPATIENT
Start: 2021-04-26 | End: 2021-04-27

## 2021-04-26 RX ORDER — ACETAMINOPHEN 325 MG/1
975 TABLET ORAL EVERY 8 HOURS
Status: DISCONTINUED | OUTPATIENT
Start: 2021-04-26 | End: 2021-05-03

## 2021-04-26 RX ORDER — METHYLPREDNISOLONE SODIUM SUCCINATE 125 MG/2ML
60 INJECTION, POWDER, LYOPHILIZED, FOR SOLUTION INTRAMUSCULAR; INTRAVENOUS EVERY 12 HOURS
Status: DISCONTINUED | OUTPATIENT
Start: 2021-04-26 | End: 2021-04-27

## 2021-04-26 RX ORDER — POLYETHYLENE GLYCOL 3350 17 G/17G
17 POWDER, FOR SOLUTION ORAL DAILY
Status: DISCONTINUED | OUTPATIENT
Start: 2021-04-27 | End: 2021-05-15

## 2021-04-26 RX ORDER — DEXTROSE MONOHYDRATE 100 MG/ML
INJECTION, SOLUTION INTRAVENOUS CONTINUOUS PRN
Status: DISCONTINUED | OUTPATIENT
Start: 2021-04-26 | End: 2021-05-15 | Stop reason: HOSPADM

## 2021-04-26 RX ORDER — METOPROLOL TARTRATE 1 MG/ML
2.5 INJECTION, SOLUTION INTRAVENOUS EVERY 6 HOURS
Status: CANCELLED | OUTPATIENT
Start: 2021-04-26

## 2021-04-26 RX ORDER — FLUOXETINE 20 MG/5ML
80 SOLUTION ORAL DAILY
Status: DISCONTINUED | OUTPATIENT
Start: 2021-04-27 | End: 2021-04-27

## 2021-04-26 RX ORDER — NICOTINE POLACRILEX 4 MG
15-30 LOZENGE BUCCAL
Status: DISCONTINUED | OUTPATIENT
Start: 2021-04-26 | End: 2021-05-15 | Stop reason: HOSPADM

## 2021-04-26 RX ORDER — NALOXONE HYDROCHLORIDE 0.4 MG/ML
0.4 INJECTION, SOLUTION INTRAMUSCULAR; INTRAVENOUS; SUBCUTANEOUS
Status: DISCONTINUED | OUTPATIENT
Start: 2021-04-26 | End: 2021-05-15 | Stop reason: HOSPADM

## 2021-04-26 RX ORDER — LORAZEPAM 0.5 MG/1
0.5 TABLET ORAL 2 TIMES DAILY PRN
Status: DISCONTINUED | OUTPATIENT
Start: 2021-04-26 | End: 2021-04-27

## 2021-04-26 RX ORDER — CARBOXYMETHYLCELLULOSE SODIUM 5 MG/ML
1 SOLUTION/ DROPS OPHTHALMIC
Status: DISCONTINUED | OUTPATIENT
Start: 2021-04-26 | End: 2021-05-15 | Stop reason: HOSPADM

## 2021-04-26 RX ORDER — DEXTROSE MONOHYDRATE 25 G/50ML
25-50 INJECTION, SOLUTION INTRAVENOUS
Status: DISCONTINUED | OUTPATIENT
Start: 2021-04-26 | End: 2021-05-15 | Stop reason: HOSPADM

## 2021-04-26 RX ORDER — LIDOCAINE 40 MG/G
CREAM TOPICAL
Status: DISCONTINUED | OUTPATIENT
Start: 2021-04-26 | End: 2021-04-27

## 2021-04-26 RX ORDER — BISACODYL 10 MG
10 SUPPOSITORY, RECTAL RECTAL DAILY PRN
Status: DISCONTINUED | OUTPATIENT
Start: 2021-04-26 | End: 2021-05-15 | Stop reason: HOSPADM

## 2021-04-26 RX ORDER — GADOBUTROL 604.72 MG/ML
7.5 INJECTION INTRAVENOUS ONCE
Status: COMPLETED | OUTPATIENT
Start: 2021-04-26 | End: 2021-04-26

## 2021-04-26 RX ORDER — SENNOSIDES 8.6 MG
8.6 TABLET ORAL 2 TIMES DAILY PRN
Status: DISCONTINUED | OUTPATIENT
Start: 2021-04-26 | End: 2021-05-15 | Stop reason: HOSPADM

## 2021-04-26 RX ORDER — MESALAMINE 1000 MG/1
1000 SUPPOSITORY RECTAL AT BEDTIME
Status: DISCONTINUED | OUTPATIENT
Start: 2021-04-26 | End: 2021-05-15 | Stop reason: HOSPADM

## 2021-04-26 RX ORDER — HYDROXYZINE HYDROCHLORIDE 25 MG/1
25-50 TABLET, FILM COATED ORAL EVERY 6 HOURS PRN
Status: DISCONTINUED | OUTPATIENT
Start: 2021-04-26 | End: 2021-04-27

## 2021-04-26 RX ORDER — LACTULOSE 10 G/15ML
3 SOLUTION ORAL 2 TIMES DAILY
Status: DISCONTINUED | OUTPATIENT
Start: 2021-04-26 | End: 2021-05-09

## 2021-04-26 RX ADMIN — ENOXAPARIN SODIUM 40 MG: 100 INJECTION SUBCUTANEOUS at 17:53

## 2021-04-26 RX ADMIN — PROCHLORPERAZINE EDISYLATE 5 MG: 5 INJECTION INTRAMUSCULAR; INTRAVENOUS at 08:01

## 2021-04-26 RX ADMIN — LACTULOSE 3 G: 20 SOLUTION ORAL at 08:01

## 2021-04-26 RX ADMIN — METOPROLOL TARTRATE 2.5 MG: 1 INJECTION, SOLUTION INTRAVENOUS at 02:32

## 2021-04-26 RX ADMIN — LORAZEPAM 0.5 MG: 2 INJECTION INTRAMUSCULAR; INTRAVENOUS at 13:02

## 2021-04-26 RX ADMIN — ACYCLOVIR: 50 OINTMENT TOPICAL at 17:53

## 2021-04-26 RX ADMIN — METHYLPREDNISOLONE SODIUM SUCCINATE 62.5 MG: 125 INJECTION, POWDER, FOR SOLUTION INTRAMUSCULAR; INTRAVENOUS at 21:49

## 2021-04-26 RX ADMIN — ACYCLOVIR: 50 OINTMENT TOPICAL at 06:23

## 2021-04-26 RX ADMIN — ACYCLOVIR: 50 OINTMENT TOPICAL at 13:24

## 2021-04-26 RX ADMIN — MESALAMINE 1000 MG: 1000 SUPPOSITORY RECTAL at 22:49

## 2021-04-26 RX ADMIN — I.V. FAT EMULSION 250 ML: 20 EMULSION INTRAVENOUS at 22:42

## 2021-04-26 RX ADMIN — BUSPIRONE HYDROCHLORIDE 30 MG: 15 TABLET ORAL at 08:00

## 2021-04-26 RX ADMIN — SULFAMETHOXAZOLE AND TRIMETHOPRIM 320 MG: 80; 16 INJECTION, SOLUTION, CONCENTRATE INTRAVENOUS at 08:03

## 2021-04-26 RX ADMIN — Medication: at 22:43

## 2021-04-26 RX ADMIN — METOPROLOL TARTRATE 2.5 MG: 1 INJECTION, SOLUTION INTRAVENOUS at 08:01

## 2021-04-26 RX ADMIN — SULFAMETHOXAZOLE AND TRIMETHOPRIM 320 MG: 80; 16 INJECTION, SOLUTION, CONCENTRATE INTRAVENOUS at 22:02

## 2021-04-26 RX ADMIN — BUSPIRONE HYDROCHLORIDE 30 MG: 15 TABLET ORAL at 21:56

## 2021-04-26 RX ADMIN — AMLODIPINE BESYLATE 10 MG: 10 TABLET ORAL at 08:01

## 2021-04-26 RX ADMIN — PANTOPRAZOLE SODIUM 40 MG: 40 INJECTION, POWDER, FOR SOLUTION INTRAVENOUS at 08:02

## 2021-04-26 RX ADMIN — SENNOSIDES AND DOCUSATE SODIUM 2 TABLET: 8.6; 5 TABLET ORAL at 08:00

## 2021-04-26 RX ADMIN — ACYCLOVIR: 50 OINTMENT TOPICAL at 08:02

## 2021-04-26 RX ADMIN — LEVETIRACETAM 250 MG: 100 INJECTION, SOLUTION INTRAVENOUS at 08:02

## 2021-04-26 RX ADMIN — POLYETHYLENE GLYCOL 3350 17 G: 17 POWDER, FOR SOLUTION ORAL at 08:02

## 2021-04-26 RX ADMIN — METHYLPREDNISOLONE SODIUM SUCCINATE 62.5 MG: 125 INJECTION, POWDER, FOR SOLUTION INTRAMUSCULAR; INTRAVENOUS at 04:04

## 2021-04-26 RX ADMIN — GADOBUTROL 7.5 ML: 604.72 INJECTION INTRAVENOUS at 20:24

## 2021-04-26 RX ADMIN — LACTULOSE 3 G: 20 SOLUTION ORAL at 21:57

## 2021-04-26 RX ADMIN — FLUOXETINE HYDROCHLORIDE 80 MG: 20 LIQUID ORAL at 08:02

## 2021-04-26 RX ADMIN — ACYCLOVIR: 50 OINTMENT TOPICAL at 22:19

## 2021-04-26 RX ADMIN — ACYCLOVIR: 50 OINTMENT TOPICAL at 00:19

## 2021-04-26 RX ADMIN — SODIUM PHOSPHATE, MONOBASIC, MONOHYDRATE 15 MMOL: 276; 142 INJECTION, SOLUTION INTRAVENOUS at 08:02

## 2021-04-26 RX ADMIN — LINACLOTIDE 290 MCG: 290 CAPSULE, GELATIN COATED ORAL at 06:23

## 2021-04-26 RX ADMIN — PANTOPRAZOLE SODIUM 40 MG: 40 TABLET, DELAYED RELEASE ORAL at 21:57

## 2021-04-26 RX ADMIN — LEVETIRACETAM 250 MG: 100 INJECTION, SOLUTION, CONCENTRATE INTRAVENOUS at 22:09

## 2021-04-26 RX ADMIN — ACYCLOVIR: 50 OINTMENT TOPICAL at 02:32

## 2021-04-26 ASSESSMENT — MIFFLIN-ST. JEOR: SCORE: 1208.13

## 2021-04-26 ASSESSMENT — ACTIVITIES OF DAILY LIVING (ADL)
ADLS_ACUITY_SCORE: 24
ADLS_ACUITY_SCORE: 24
ADLS_ACUITY_SCORE: 22
ADLS_ACUITY_SCORE: 22
ADLS_ACUITY_SCORE: 23
ADLS_ACUITY_SCORE: 22

## 2021-04-26 NOTE — PLAN OF CARE
IMC status. A&Ox2-4, depends on how sleepy pt is. Lethargic at times. VSS ex HTN at times, scheduled meds given and on High Flow 38% 30L, placed on BIPAP for couple hours overnight. Tele: SR. Up with A2 Lift. Turn/Repo q2hrs. Full liquid, nectar thick diet, encourage oral intake. Pills crushed in pudding. TPN infusing @45mL/hr with lipids. BG checks q4hrs: 186, 138 & 107. C/o minimal abdominal pain, managed with PRN Tylenol, effective. BS active. Abdomen distended with bruising. Simethicone given. Constipated, small formed BM x2. Incontinent B/B, purewick in place. Mepilex to coccyx for prevention. LS clear. JACOBSEN. Edema to left ankle/foot. PCD's on overnight. PICC to [facility]. Discharge pending, Waiting for bed at [facility] to open for transfer. Daughter called earlier in night and update given. Continue to monitor.

## 2021-04-26 NOTE — PROGRESS NOTES
RADIATION ONCOLOGY PROGRESS NOTE    As discussed by Dr. Mcdonald with the inpatient team over the weekend, if the patient is not being transferred and radiotherapy at [facility] is indicated, a repeat brain MRI with and without contrast would be indicated (if/when the patient is stable to undergo this) to establish the current extent of the glioblastoma and to aid in radiotherapy treatment planning.    Srikanth Carrington MD  [phone]

## 2021-04-26 NOTE — PLAN OF CARE
A&O x4 - occasionally disorientated to time. VSS on HFNC 40%/30L. Tele NSR - occasionally tachycardic. CMS intact. Lungs diminished. Full nectar thick diet - minimal appetite. Denies N/V. Voiding adequately via purwick. Tylenol for pain. Ativan 2x for anxiety Up w/ 2 and lift- tuen and repo Q2. Fleet water enema given with minimal success. Tap water enema given with more success. Placed on BiPap for 1 hour today d/t increased work of breathing and tachypnea. Awaiting bed at [facility]

## 2021-04-26 NOTE — PROGRESS NOTES
CLINICAL NUTRITION SERVICES - REASSESSMENT NOTE      Malnutrition:  (4/5)  % Weight Loss:  None noted  % Intake:  </= 50% for >/= 5 days (severe malnutrition)   Subcutaneous Fat Loss: None noted  Muscle Loss: Mild clavicle wasting, mild deltoid wasting, mild dorsal wasting.  Fluid Retention: +1 BLE      Malnutrition Diagnosis: Non Severe Malnutrition in the context of acute on chronic illness.       EVALUATION OF PROGRESS TOWARD GOALS   Diet:      Nutrition Support:  TPN was increased from Clinimix at 30 mL/hr --> Custom at 45 mL/hr on 4/22 as below:    Nutrition Support Parenteral:  Type of Access:  PICC  Parenteral Frequency:  Continuous  Parenteral Regimen:  D20 A6.7 at 45 mL/hr + Lipids 250 mL daily   Total Parenteral Provisions:  1522 kcals (27 kcal/kg), 72 gm pro (1.3 gm/kg), 216 gm CHO, 33% fat       Intake/Tolerance:    BM x2 today and x3 yesterday (abdominal discomfort & constipation).  Linzess, Lactulose, Miralax, and Senokot on board.  Na 130 (L), Phos 1.7 (L) --> both were increased in TPN yesterday.  Pt got NaPhos IVF bump today.  BGM:  153, 172, 113, 186, 139, 107 - mostly acceptable (< 180).  I/O:  3504/1300.  Oral 15 mL.  Wt:  74.4 kg (up 6.3 kg since admit).  Pt with 1+ trace generalized edema.    ASSESSED NUTRITION NEEDS:  Dosing Weight: 55.1 kg - adjusted from 63.8 kg  Estimated Energy Needs: 4314-7679 kcals (25-30 kcal/kg) - Overweight  Estimated Protein Needs: 66-83 grams (1.2-1.5 g/kg) - hypercatabolism with acute illness     NEW FINDINGS:   4/23:  Abd CT   1.  A large amount of formed stool in the colon and rectum with mild nonspecific perirectal soft tissue stranding. Findings could indicate mild/early stercoral colitis.   2.  No other acute findings in the abdomen and pelvis.   3.  Slight decreased size of the large right retroperitoneal hematoma.  Stable small rectus sheath hematoma     Patient with lip ulceration per nursing notes.  Patient reliant on bipap.  Awaiting transfer to [facility]  [place].    Previous Goals (4/5):   TPN to meet % of needs within the next 48 hours.   Evaluation: Met    Previous Nutrition Diagnosis (4/5):   Inadequate parenteral nutrition infusion related to TPN held due to respiratory decompensation as evidenced by pt only received 73% of energy needs and 55% of protein needs  Evaluation:  Resolved    CURRENT NUTRITION DIAGNOSIS  No nutrition diagnosis identified at this time     INTERVENTIONS  Recommendations / Nutrition Prescription  Continue same TPN for now    Implementation  Collaboration and Referral of Nutrition care - Discussed with Pharmacist, who will check with MD regarding low Na level.    Goals  TPN/Lipids will continue to meet % estimated needs.    MONITORING AND EVALUATION:  Progress towards goals will be monitored and evaluated per protocol and Practice Guidelines    Olya eBrman, HORACE, LD, CNSC

## 2021-04-26 NOTE — DISCHARGE SUMMARY
"[facility]    Discharge Summary  Hospitalist    Date of Admission:  3/26/2021  Date of Discharge:  4/26/2021 (transferring to [facility])  Discharging Provider: Bonilla Tian MD    Discharge Diagnoses   Active Problems:    PJP Pneumonia (Pneumocystosis pneumonia)    Glioblastoma -- S/P Surg Resection 2/23/21    At risk for Seizures (had jerking 4/14/21, suspect hypotension related, Keppra  restarted as precaution)    Acute Respiratory Failure with Hypoxia    Right Retroperitoneal Bleed on Lovenox on 4/14/21    Bilateral DVT's, 2nd to Hypercoag related to GBM    S/P IVC Filter 4/16/21    Thrombocytopenia, probable consumption related to bleed    Ileus with subsequent constipation, 2nd to retroperitoneal bleed    Gastroesophageal reflux disease without esophagitis    Hyponatremia    Hypokalemia    Hypophosphatemia    Dry eyes    Primary HSV infection of mouth      History of Present Illness   75 year old woman who presents with several days of dyspnea, at rest; exacerbated by exertion; characterized as inability to get a full deep breath. It is associated with diffuse chest tightness and fatigue as well as bilateral leg heaviness. She denies associated fevers, chills, sweats, cough, runny nose, sore throat or change in bowel habits consisting of loose but non-watery stool; or myalgias or arthralgias. She came in tonight because the symptoms which have been constant since onset acutely worsened. She tells me everything involving her cancer diagnosis has been very overwhelming for her at home, but she feels supported by her family who are with her, and wishes to optimize the time she has left. She otherwise denies any other acute complaints.     In the ED, Blood pressure (!) 140/79, pulse 67, temperature 98.3  F (36.8  C), temperature source Temporal, resp. rate 17, height 1.6 m (5' 3\"), weight 69.9 kg (154 lb), SpO2 94 %.     CBC and CMP were notable for HGB 11.4, alb 3.0, " "trpot 6.3, otherwise were within the normal reference range. Glucose 187. , Troponin negative. EKG showed NSR without ST segment elevations. D-Dimer was 0.7. COVID, Influenza, RSV PCR was negative.     CTPA showed: \"IMPRESSION:  1.  No evidence for pulmonary embolism.  2.  Patchy groundglass opacities throughout both lungs are  nonspecific, but could be infectious in etiology. COVID-19 pneumonia  could have this appearance.\"      Hospital Course      Acute bilateral Pneumonia, Probable PJP  [place] [facility] [place] bilateral initially with Ceftriaxone and Doxycycline (given allergy to Erythromycin), seen by ID and thought high suspicion for PJP pneumonia, and switched to  zosyn and doxy but add bactrim treatment dose for PJP and awaited sputum samples, but never able to get adequate sample, but clinically improved on Bactrim DS 2 pills tid, and respiratory wise improved after that point, and was done to 1 LPM and at times room air, until had right retroperitoneal bleed on 4/14/21, and 1-2 days after that had respiratory decline, unclear if fluid overload (lasix 40 mg IV added with weight increased 10-13 lbs), vs new bacterial pneumonia, vs lung inflammation (Prednisone was weaned down to 10 mg daily, but then increased to Solumedrol 62.5 mg IV bid), and has completed 3 weeks of high dose bactrim but will continue, currently on 320 mg IV q12 hr.  Has been seen by pulmonary, diagnostic bronchoscopy an option, but would probably result in intubation/mech ventilation, and thus has not been pursued.      Glioblastoma Multiforme   Left frontoparietal brain glioblastoma tumor, S/P resection 2/23/21.  Pt was seen by Dr. Trujillo radiation oncologist at [facility] on 3/24/21 and XRT & chemorx. was recommended.  Pt subsequently hospitalized 3/26/21 here at [facility].  Pt being treated for pneumonia & DVT.    I met with pt. & reviewed imaging.  I discussed with Dr. Gamboa oncology & Dr. Bernabe " hospitalist.. Pt may be candidate for XRT rx. once stronger.  May need updated brain MRI to assess status of brain tumor postop. Pt may require hospital based RT Dept if she is transferred to TCU & XRT treatment desired.  Dr. Bernabe will discuss with Dr. Gamboa.  Timing of treatment of brain tumor will depend on pt's status -- MRI with and without contrast suggested prior to radiation therapy given 2 months since tumor resected.      Bilateral DVT, 2nd to Hypercoagulable related to GBM  Placed on IV heparin, then Lovenox 70 mg subcutaneous bid, and was stable until right retroperitoneal bleed on 4/14/21 thought related to Lovenox, at which point Lovenox stopped, hgb drifted down over 36 hours, got 4 units of PRBC's, Abd CT with contrast and vascular surgery consult, no bleed identified and hgb stable since then.  IVC filter placed by IR on 4/16/21, and now on Lovenox 40 mg subcutaneous.     Right Retroperitoneal Bleed  As above, no problems since spontaneous bleed on Lovenox 4.14/21    Ileus  Developed morning of 4/14/21, suspect related to retroperitoneal bleed, and given enemas to help with bowel activity and bowels did move 4/25/2021 and had minimal distension with bowel sounds at this time.  Has been on TPN for about 10 days, has full liquid diet with plans to wean off TPN when oral intake improves.     Hypophosphatemia  Phosphorus 1.7 today, being replaced     Non-severe malnutrition  Noted decrease oral intake and drop in albumin, subcutaneous fat loss on exam.   - PICC line in place  - previously with excellent oral intake, tapered off TPN 4/9, but restarted 4/18 as above  - 4/22 TPN to increase to 45/hr, continue PO intake noted.   - Appreciate dietician assistance, discussed plan.     Shock Liver with Elevated LFT's   - related to Retroperitoneal Bleed, resolving     Thrombocytopenia -- suspect consumption from bleeding  - follow CBC - stable/improved 4/20 - 102k --> 139k 4/21 --> 194k 4/22 --> 222k 4/23 -->  247k 4/24     Leukocytosis - likely stress and steroid related, afebrile                Epistaxis - intermittent for several days, now stable since 4/11/21     Hyponatremia  - mild, 2nd to SIADH from stress and pulm process  - stable, low 130s primarily     Steroid induced hyperglycemia -- resolved  - restart insulin as need     Urinary incontinence -- has pure wick     Hypokalemia, variable - replacement protocol     Hypertension  - continue PTA amlodipine when taking PO (on bipap)     Sinus tachycardia -- clinically improved  - metoprolol 12.5 mg bid po or IV 2.5 q 6 given npo     Asthma  MARCELLE  - nebs prn  - resume CPAP with home setting 4/9     Depression and anxiety  Insomnia  - continue PTA Prozac and Buspar   - continue PTA Atarax for sleep  - prn lorazepam     GERD  - switch to PPI as above    Significant Results and Procedures   As above    Pending Results   These results will be followed up by Dr. Rosa  Unresulted Labs Ordered in the Past 30 Days of this Admission     No orders found from 2/24/2021 to 3/27/2021.          Code Status   Full Code (last discussed 4/19/2021) -- daughter helps patient make decisions (she is a nurse), patient is  but ex- is a physician and wants to be updated, but he is not the decision-maker       Primary Care Physician   [facility]    Physical Exam   Temp: 97.9  F (36.6  C) Temp src: Axillary BP: (!) 167/87 Pulse: 101   Resp: 25 SpO2: 93 % O2 Device: High Flow Nasal Cannula (HFNC) Oxygen Delivery: 30 LPM  Vitals:    04/24/21 0600 04/25/21 0650 04/26/21 0700   Weight: 67.9 kg (149 lb 11.1 oz) 67.5 kg (148 lb 13 oz) 74.4 kg (164 lb 0.4 oz)     Vital Signs with Ranges  Temp:  [97.9  F (36.6  C)-98.8  F (37.1  C)] 97.9  F (36.6  C)  Pulse:  [] 101  Resp:  [19-57] 25  BP: (126-167)/() 167/87  FiO2 (%):  [35 %-60 %] 45 %  SpO2:  [91 %-100 %] 93 %  I/O last 3 completed shifts:  In: 3504.4 [P.O.:15; I.V.:730]  Out: 1300 [Urine:1300]    Exam on  discharge:   Alert, Ox1.5, can answer general questions but does not know the date, knows she is at [facility], mild cognitive decline the last 2 months    Discharge Disposition   Discharged to [facility], with Oncology Consult there  Condition at discharge: Guarded    Consultations This Hospital Stay   PHYSICAL THERAPY ADULT IP CONSULT  OCCUPATIONAL THERAPY ADULT IP CONSULT  PHARMACY IP CONSULT  PHARMACY IP CONSULT  INFECTIOUS DISEASES IP CONSULT  PHARMACY IP CONSULT  PHARMACY IP CONSULT  PULMONARY IP CONSULT  PHARMACY IP CONSULT  PHARMACY IP CONSULT  PHARMACY IP CONSULT  PHARMACY IP CONSULT  PHARMACY/NUTRITION TO START AND MANAGE TPN  VASCULAR ACCESS ADULT IP CONSULT  VASCULAR ACCESS ADULT IP CONSULT  PHARMACY IP CONSULT  CARE MANAGEMENT / SOCIAL WORK IP CONSULT  PHYSICAL THERAPY ADULT IP CONSULT  OCCUPATIONAL THERAPY ADULT IP CONSULT  SPEECH LANGUAGE PATH ADULT IP CONSULT  SPEECH LANGUAGE PATH ADULT IP CONSULT  OCCUPATIONAL THERAPY ADULT IP CONSULT  PHYSICAL THERAPY ADULT IP CONSULT  NEUROLOGY IP CONSULT  SURGERY GENERAL IP CONSULT  SURGERY GENERAL IP CONSULT  VASCULAR SURGERY IP CONSULT  GASTROENTEROLOGY IP CONSULT  PHARMACY/NUTRITION TO START AND MANAGE TPN  PHARMACY IP CONSULT  PHARMACY IP CONSULT  PHARMACY IP CONSULT  PHARMACY IP CONSULT  SWALLOW EVAL SPEECH PATH AT BEDSIDE IP CONSULT  HEMATOLOGY & ONCOLOGY IP CONSULT  PHARMACY IP CONSULT  PULMONARY IP CONSULT  PHARMACY IP CONSULT  RADIATION ONCOLOGY IP CONSULT  PHYSICAL THERAPY ADULT IP CONSULT    Time Spent on this Encounter   I spent a total of 35 minutes discharging this patient.     Discharge Orders      Reason for your hospital stay    You were admitted for shortness of breath due to suspected pneumocystis jirovecii pneumonia.     Glucose monitor nursing POCT    Before meals and at bedtime     IV access    PICC.     Activity - Up with nursing assistance     Follow Up and recommended labs and tests    Continue cares with next hospital  team.  Bactrim day 21/21 on 4/19 - given worsening, ID rec continuation of bactrim for the time being. Additionally, steroids were tapering down, however has been continued on solumedrol 60 q 12. Keppra 250 bid x 7 days as of 4/19 per Neurology after concern of seizure - subsequently will defer regimen to Neuro-oncology    Oncology/Neuro-oncology, Rad onc, Pulmonary, ID, and GI have been following  Dr Stephanie Baker should be alerted of patient's arrival at [facility].     Previous considerations:  Follow up with Dr. Patterson of Neuro-Oncology within 1 week.  Follow up with Pulmonology Clinic in 1 month with repeat CT scan of the chest.     Additional Discharge Instructions    Plan upon arrival to [facility] is to obtain MRI brain with and without contrast for updated post operative GBM evaluation.  Subsequently, Neuro-Onc and Rad-Onc can further develop a treatment plan.      Patient continues to have considerable stool burden with minimal success thus far despite aggressive regimen. In addition, she continues to slowly recover from acute hypoxic respiratory failure believed to be secondary to PCP pneumonia.     Full Code     Speech Language Path Adult Consult    Evaluate and treat as clinically indicated.    Reason:  Language therapy following GBM resection     Occupational Therapy Adult Consult    Evaluate and treat as clinically indicated.    Reason:  deconditioning     Physical Therapy Adult Consult    Evaluate and treat as clinically indicated.    Reason:  deconditioning     Oxygen Adult/Peds    Oxygen Documentation:   I certify that this patient, Olga Bailey has been under my care (or a nurse practitioner or physican's assistant working with me). This is the face-to-face encounter for oxygen medical necessity.      Olga Bailey is now in a chronic stable state and continues to require supplemental oxygen. Patient has continued oxygen desaturation due to pneumocystis pneumonia.    Alternative  treatment(s) tried or considered and deemed clinically infective for treatment of pnemocystis pneumonia include nebulizers, steroids and pulmonary toileting.  If portability is ordered, is the patient mobile within the home? yes    **Patients who qualify for home O2 coverage under the CMS guidelines require ABG tests or O2 sat readings obtained closest to, but no earlier than 2 days prior to the discharge, as evidence of the need for home oxygen therapy. Testing must be performed while patient is in the chronic stable state. See notes for O2 sats.**     Advance Diet as Tolerated    Follow this diet upon discharge:   TPN at 45/hr  [place] thickened liquids and magic shakes     Discharge Medications   Current Discharge Medication List      START taking these medications    Details   acyclovir (ZOVIRAX) 5 % external ointment Apply topically every 3 hours    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      albuterol (PROAIR HFA/PROVENTIL HFA/VENTOLIN HFA) 108 (90 Base) MCG/ACT inhaler Inhale 2 puffs into the lungs every 4 hours as needed for wheezing  Qty:      Comments: Pharmacy may dispense brand covered by insurance (Proair, or proventil or ventolin or generic albuterol inhaler)  Associated Diagnoses: 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, unspecified part of lung (H)      albuterol (PROVENTIL) (2.5 MG/3ML) 0.083% neb solution Take 1 vial (2.5 mg) by nebulization every 4 hours as needed for wheezing or shortness of breath / dyspnea  Qty:      Associated Diagnoses: Hypoxia      bisacodyl (DULCOLAX) 10 MG suppository Place 1 suppository (10 mg) rectally daily as needed for constipation    Associated Diagnoses: Essential hypertension      carboxymethylcellulose PF (REFRESH PLUS) 0.5 % ophthalmic solution Place 1 drop into both eyes every hour as needed for dry eyes    Associated Diagnoses: Dry eyes      enoxaparin ANTICOAGULANT (LOVENOX) 40 MG/0.4ML syringe Inject 0.4 mLs (40 mg) Subcutaneous  every 24 hours    Associated Diagnoses: Acute deep vein thrombosis (DVT) of tibial vein of both lower extremities (H)      insulin aspart (NOVOLOG VIAL) 100 UNITS/ML vial Novolog Flexpen  If Pre-meal Glucose  140 -164 give 1 unit  165 -189 give 2 units  190 -214 give 3 units  215 -239 give 4 units  240 -264 give 5 units  265 -289 give 6 units  290 -314 give 7 units  315 -339 give 8 units  340+ give 9 units    If Bedtime Glucose  200 -214 give 1 unit  215 -264 give 2 units  265 -314 give 3 units  315+ give 4 units  Qty: 10 mL, Refills: 0    Associated Diagnoses: Steroid-induced hyperglycemia      lactulose (CHRONULAC) 10 GM/15ML solution Take 4.5 mLs (3 g) by mouth 2 times daily    Associated Diagnoses: Other impaction of intestine (H)      levalbuterol (XOPENEX CONC) 1.25 MG/0.5ML neb solution Take 0.5 mLs (1.25 mg) by nebulization every 4 hours (while awake)  Qty:      Associated Diagnoses: 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, unspecified part of lung (H)      levETIRAcetam (KEPPRA) 250 MG tablet Take 1 tablet (250 mg) by mouth 2 times daily for 2 days    Comments: FURTHER REGIMEN TO BE DETERMINED BY NEURO-ONC  Associated Diagnoses: Glioblastoma (H)      linaclotide (LINZESS) 290 MCG capsule Take 1 capsule (290 mcg) by mouth every morning (before breakfast)    Associated Diagnoses: Other impaction of intestine (H)      lipids (INTRALIPID) 20 % infusion Inject 250 mLs into the vein every 24 hours    Associated Diagnoses: Other dysphagia      LORazepam (ATIVAN) 0.5 MG tablet Take 1 tablet (0.5 mg) by mouth 2 times daily as needed for anxiety  Qty: 10 tablet, Refills: 0    Associated Diagnoses: Anxiety      mesalamine (CANASA) 1000 MG suppository Place 1 suppository (1,000 mg) rectally At Bedtime    Associated Diagnoses: Other impaction of intestine (H)      methylPREDNISolone sodium succinate (SOLU-MEDROL) 125 mg/2 mL injection Inject 1 mL (62.5 mg) into the vein every 12 hours    Associated  Diagnoses: 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, unspecified part of lung (H)      metoprolol (LOPRESSOR) 5 MG/5ML injection Inject 2.5 mLs (2.5 mg) into the vein every 6 hours  Qty:      Associated Diagnoses: Essential hypertension      pantoprazole (PROTONIX) 40 mg IV push injection Inject 40 mg into the vein 2 times daily (with meals)  Qty:      Associated Diagnoses: Gastroesophageal reflux disease without esophagitis      !! parenteral nutrition - PTA/DISCHARGE ORDER The TPN formula will print on the After Visit Summary Report.  Qty: 1 each, Refills: 0    Associated Diagnoses: Other dysphagia      !! parenteral nutrition - PTA/DISCHARGE ORDER The TPN formula will print on the After Visit Summary Report.  Qty: 1 each, Refills: 0    Associated Diagnoses: Other dysphagia      !! polyethylene glycol (MIRALAX) 17 g packet Take 17 g by mouth daily    Associated Diagnoses: Other impaction of intestine (H)      !! polyethylene glycol (MIRALAX) 17 g packet Take 17 g by mouth daily as needed (constipation)    Associated Diagnoses: Other impaction of intestine (H)      senna-docusate (SENOKOT-S/PERICOLACE) 8.6-50 MG tablet Take 2 tablets by mouth 2 times daily  Qty:      Associated Diagnoses: Other impaction of intestine (H)      simethicone (MYLICON) 40 MG/0.6ML suspension Take 0.6 mLs (40 mg) by mouth every 6 hours as needed for cramping    Associated Diagnoses: Other impaction of intestine (H)      sodium phosphate (NaPHOS) 9 mMol in 250 mL D5W (pre-mix) infusion Inject 250 mLs (9 mmol) into the vein once for 1 dose  Qty: 250 mL, Refills: 0    Associated Diagnoses: Hyponatremia      sulfamethoxazole-trimethoprim 320 mg Inject 320 mg into the vein every 12 hours  Qty:      Associated Diagnoses: 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 - Potential duplicate medications found. Please discuss with provider.      CONTINUE these medications which have NOT  CHANGED    Details   acetaminophen (TYLENOL) 500 MG tablet Take 500 mg by mouth 2 times daily as needed for mild pain      amLODIPine (NORVASC) 10 MG tablet Take 1 tablet (10 mg) by mouth daily  Qty: 30 tablet, Refills: 0    Associated Diagnoses: Essential hypertension      busPIRone HCl (BUSPAR) 30 MG tablet Take 30 mg by mouth 2 times daily      FLUoxetine (PROZAC) 20 MG capsule Take 80 mg by mouth daily      hydrOXYzine (ATARAX) 25 MG tablet Take 1-2 tablets (25-50 mg) by mouth every 6 hours as needed for anxiety or other (sleep)  Qty: 20 tablet, Refills: 0    Associated Diagnoses: Anxiety         STOP taking these medications       dexamethasone (DECADRON) 2 MG tablet Comments:   Reason for Stopping:         dexamethasone (DECADRON) 2 MG tablet Comments:   Reason for Stopping:         dexamethasone (DECADRON) 2 MG tablet Comments:   Reason for Stopping:         famotidine (PEPCID) 20 MG tablet Comments:   Reason for Stopping:         ondansetron (ZOFRAN) 4 MG tablet Comments:   Reason for Stopping:         sulfamethoxazole-trimethoprim (BACTRIM DS) 800-160 MG tablet Comments:   Reason for Stopping:         temozolomide (TEMODAR) 140 MG capsule Comments:   Reason for Stopping:             Allergies   Allergies   Allergen Reactions     Bacitracin Rash     Bactroban is effective; No difficulties     Erythromycin      intolerant.     Meperidine Hcl      intolerant only   Demerol     Chloraprep One Step Rash     Data   Most Recent 3 CBC's:  Recent Labs   Lab Test 04/25/21  0625 04/24/21  0635 04/23/21  0550   WBC 13.6* 15.2* 13.3*   HGB 9.4* 10.1* 9.8*   MCV 90 88 87    247 222      Most Recent 3 BMP's:  Recent Labs   Lab Test 04/26/21  0615 04/25/21  0805 04/25/21  0625   * 130* Canceled, Test credited, specimen discarded   POTASSIUM 3.9 4.0 Canceled, Test credited, specimen discarded   CHLORIDE 98 97 Canceled, Test credited, specimen discarded   CO2 27 27 Canceled, Test credited, specimen discarded    BUN 18 23 Canceled, Test credited, specimen discarded   CR 0.42* 0.45* Canceled, Test credited, specimen discarded   ANIONGAP 5 6 Canceled, Test credited, specimen discarded   ESTIVEN 8.1* 8.6 Canceled, Test credited, specimen discarded   * 153* Canceled, Test credited, specimen discarded     Most Recent 2 LFT's:  Recent Labs   Lab Test 04/26/21  0615 04/25/21  0805   AST 42 44   * 138*   ALKPHOS 119 127   BILITOTAL 0.4 0.8     Most Recent INR's and Anticoagulation Dosing History:  Anticoagulation Dose History     Recent Dosing and Labs Latest Ref Rng & Units 2/17/2021 2/18/2021 2/22/2021 4/2/2021 4/5/2021 4/16/2021    INR 0.86 - 1.14 0.98 1.03 1.02 1.00 1.02 1.18(H)        Most Recent 3 Troponin's:  Recent Labs   Lab Test 04/14/21  2150 04/14/21  1627 03/29/21  1947   TROPI 0.021 <0.015 <0.015     Most Recent Cholesterol Panel:  Recent Labs   Lab Test 04/01/21  0500   TRIG 94     Most Recent 6 Bacteria Isolates From Any Culture (See EPIC Reports for Culture Details):  Recent Labs   Lab Test 03/29/21 1951 03/29/21 1946   CULT No growth No growth     Most Recent TSH, T4 and A1c Labs:  Recent Labs   Lab Test 02/18/21  0735   A1C 5.4

## 2021-04-26 NOTE — PROGRESS NOTES
Admission          4/26/2021  3:07 PM  -----------------------------------------------------------  Reason for admission: Patient/family preference, continuation of care.   Primary team notified of pt arrival.  Admitted from: [facility]  Via: stretcher  Accompanied by: N/A  Belongings: Placed in closet  Admission Profile: complete  Teaching: orientation to unit and call light- call light within reach, call don't fall, use of console, meal times, when to call for the RN, and enforced importance of safety   Access: TL PICC  Telemetry:Placed on pt  Ht./Wt.: complete  Code Status verified on armband: yes  2 RN Skin Assessment Completed By: Brittany Rios Rec completed: no - pharmacy in progress.   Bed surface reassessed with algorithm and charted: yes  New bed surface ordered: yes  Suction/Ambu bag/Flowmeter at bedside: yes    Pt status: Disoriented to time, otherwise alert and oriented. Sinus tach 100's, BP elevated 150's/90's. Tolerating 8 liters via oxymask upon arrival, able to wean down to 4 liters. Afebrile. RR 28. Denies pain at this time. Denies shortness of breath. [place] in place. Bed alarm on for safety. Plan to do CXR, abdominal XR, Brain MRI, VBG, and blood cultures. Asymptomatic covid swab sent. Daughter, La Nena, raymon.     Temp:  [97.7  F (36.5  C)-98.8  F (37.1  C)] 98.8  F (37.1  C)  Pulse:  [] 104  Resp:  [18-55] 28  BP: (131-167)/(79-94) 153/90  FiO2 (%):  [35 %-52 %] 35 %  SpO2:  [91 %-100 %] 100 %

## 2021-04-26 NOTE — PROGRESS NOTES
[facility]  Gastroenterology Progress Note     Olga Bailey MRN# [id number]   YOB: 1945 Age: 75 year old          Assessment and Plan:   Olga Bailey is a pleasant 75 year old with multiple medical problems. GI re-consulted yesterday for severe constipation.     Constipation  Patient remains on BiPAP and c/o abdominal pain. Unsuccessful with Fleet enema yesterday and consequently  Given tap water enema. Had 2 hard stools yesterday.  Started on lactulose 3 g BID and Linzess 290 mcg daily  Will see how today goes and if continues to have severe constipation would recommend consult with Colorectal surgery for manual disimpaction. Could also consider NG placement and give GoLYTELY prep with the NG tube placement.  Pending transfer to [facility] when bed available     Other active problems:      Pneumonia due to 2019 novel coronavirus  Hypoxia  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, unspecified part of lung (H)  Steroid-induced hyperglycemia  Essential hypertension  Acute deep vein thrombosis (DVT) of tibial vein of both lower extremities (H)  Anxiety  Other impaction of intestine (H)  Gastroesophageal reflux disease without esophagitis  Glioblastoma (H)  Hyponatremia  Other dysphagia  Dry eyes  Primary HSV infection of mouth      Interval History:   Patient has had mild improvement in abdominal pain. Has had 3 hard small stools in last 12 hours.              Review of Systems:   C: NEGATIVE for fever, chills, change in weight  E/M: NEGATIVE for ear, mouth and throat problems  R: NEGATIVE for significant cough or SOB  CV: NEGATIVE for chest pain, palpitations or peripheral edema             Medications:   I have reviewed this patient's current medications    acyclovir   Topical Q3H     amLODIPine  10 mg Oral Daily     busPIRone HCl  30 mg Oral BID     enoxaparin ANTICOAGULANT  40 mg Subcutaneous Q24H     FLUoxetine  80 mg Oral Daily     [Held by provider]  furosemide  40 mg Intravenous Once     insulin aspart  1-12 Units Subcutaneous Q4H     lactulose  3 g Oral BID     [Held by provider] levETIRAcetam  250 mg Oral BID     linaclotide  290 mcg Oral QAM AC     lipids  250 mL Intravenous Q24H     mesalamine  1,000 mg Rectal At Bedtime     methylPREDNISolone  62.5 mg Intravenous Q12H     metoprolol  2.5 mg Intravenous Q6H     [Held by provider] metoprolol tartrate  12.5 mg Oral BID     [Held by provider] pantoprazole  40 mg Oral BID AC     pantoprazole (PROTONIX) IV  40 mg Intravenous BID w/meals     polyethylene glycol  17 g Oral Daily     senna-docusate  2 tablet Oral BID    Or     senna-docusate  2 tablet Oral BID     sodium phosphate  15 mmol Intravenous Once     sulfamethoxazole-trimethoprim  320 mg Intravenous Q12H                  Physical Exam:   Vitals were reviewed  Vital Signs with Ranges  Temp:  [97.9  F (36.6  C)-98.8  F (37.1  C)] 97.9  F (36.6  C)  Pulse:  [] 101  Resp:  [19-57] 25  BP: (126-167)/() 167/87  FiO2 (%):  [35 %-60 %] 45 %  SpO2:  [91 %-100 %] 93 %  I/O last 3 completed shifts:  In: 3504.4 [P.O.:15; I.V.:730]  Out: 1300 [Urine:1300]  Constitutional: alert, mild distress, cooperative and pale   Cardiovascular: negative, PMI normal. No lifts, heaves, or thrills. RRR. No murmurs, clicks gallops or rub  Respiratory: negative, Percussion normal. Good diaphragmatic excursion. Lungs clear  Head: Normocephalic. No masses, lesions, tenderness or abnormalities  Neck: Neck supple. No adenopathy. Thyroid symmetric, normal size,, Carotids without bruits.  Abdomen: Abdomen mildly firm, mildly tender. BS normal. No masses, organomegaly, positive findings: tenderness mild generalized           Data:   I reviewed the patient's new clinical lab test results.   Recent Labs   Lab Test 04/25/21  0625 04/24/21  0635 04/23/21  0550 04/16/21  0815 04/16/21  0815 04/05/21  0640 04/05/21  0640 04/02/21  0550 04/02/21  0550   WBC 13.6* 15.2* 13.3*   < >  --     < > 15.4*   < > 10.9   HGB 9.4* 10.1* 9.8*   < >  --    < > 9.1*   < > 9.9*   MCV 90 88 87   < >  --    < > 88   < > 88    247 222   < >  --    < > 214   < > 206   INR  --   --   --   --  1.18*  --  1.02  --  1.00    < > = values in this interval not displayed.     Recent Labs   Lab Test 04/26/21  0615 04/25/21  0805 04/25/21  0625   POTASSIUM 3.9 4.0 Canceled, Test credited, specimen discarded   CHLORIDE 98 97 Canceled, Test credited, specimen discarded   CO2 27 27 Canceled, Test credited, specimen discarded   BUN 18 23 Canceled, Test credited, specimen discarded   ANIONGAP 5 6 Canceled, Test credited, specimen discarded     Recent Labs   Lab Test 04/26/21  0615 04/25/21  0805 04/25/21  0625 04/14/21 2000 04/14/21 2000 04/02/21  1022 04/02/21  1022 02/17/21  1408 02/17/21  1408   ALBUMIN 2.4* 2.5* Canceled, Test credited, specimen discarded   < >  --    < >  --    < >  --    BILITOTAL 0.4 0.8 Canceled, Test credited, specimen discarded   < >  --    < >  --    < >  --    * 138* Canceled, Test credited, specimen discarded   < >  --    < >  --    < >  --    AST 42 44 Canceled, Test credited, specimen discarded   < >  --    < >  --    < >  --    PROTEIN  --   --   --   --  10*  --  Negative  --  Negative    < > = values in this interval not displayed.       I reviewed the patient's new imaging results.    All laboratory data reviewed  All imaging studies reviewed by me.    Belen Tripp PA-C,  4/26/2021  [facility]  Office : [phone]  Cell: [phone] (Dr. Hodge)  Cell: [phone] (Belen Tripp PA-C)

## 2021-04-26 NOTE — PROGRESS NOTES
Pt remains on HFNC 30 lpm and 50%, SpO2 96%, skin barrier applied, RT continue to follow the pt.     Carlos Hoyt, RT.

## 2021-04-26 NOTE — PROGRESS NOTES
[facility]  Transfer Triage Note    Date of call: 04/26/21  Time of call: 9:12 AM    Is pandemic COVID-19 a concern? NO    Reason for transfer: Further diagnostic work up, management, and consultation for specialized care   Diagnosis: GBM, pneumonia     Outside Records: Available  Additional records requested to be faxed to [phone].    Stability of Patient: Patient is at risk for instability, however patient requires urgent transfer and does not meet ICU criteria   ICU: No    Expected Time of Arrival for Transfer: 0-8 hours    Arrival Location:  [address] [address] Phone: [phone]    Recommendations for Management and Stabilization: Not needed    Additional Comments: 74 yo F with recent diagnosis of glioblastoma multiforme, presented with acute hypoxic respiratory failure suspected due to PJP pneumonia. Admitted 3/26, hospital course has been complicated by retroperitoneal bleed and severe ileus necessitating restarting TPN, but this is improving today with small stool output and +BS. She remains on HFNC 30L, 45%, stable on high-dose bactrim.   In regards to her RP bleed, thought due to lovenox (on for bilateral DVTs in the context of her CNS malignancy), now s/p IVC filter and Hgb stable on ppx lovenox dosing.   Prior transfer requested to pursue continued oncology care and radiation therapy for her GBM, prior discussions were with Dr. Fair (med onc) and Dr. Mcdonald (rad onc). She may need repeat brain MRI for this planning.     Bed now available and expect transfer today. Accepted to [facility] [place] bed, no tele, as medicine primary + medical oncology, rad onc consults.      Tita Marie MD
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
## 2021-04-26 NOTE — PROGRESS NOTES
Pt alert and oriented, forgetful at times, TPN is infusing through triple lumen PICC line, denies the need for pain medications, feeling anxious about the transfer did request ativan, food results, brad wick in place, good urine output, did have one large soft incontinent BM this shift, poor appetite, lungs sounds diminished on high flow NC, repositioned for comfort, mepilex in place on coccyx, skin intact under dressing, on for protection, pt will discharge to [facility], this nurse did call and give report to Tigist on 6B, transport arranged and pt will leave at 2pm

## 2021-04-26 NOTE — PLAN OF CARE
Occupational Therapy Discharge Summary    Reason for therapy discharge:    Transfer to another [facility]    Progress towards therapy goal(s). See goals on Care Plan in [facility] electronic health record for goal details.  Goals not met.  Barriers to achieving goals:   limited tolerance for therapy and discharge from facility.    Therapy recommendation(s):    Continued therapy is recommended.  Rationale/Recommendations:  Recommend continued rehab at new facility to increase endurance and strength for ADLs.

## 2021-04-26 NOTE — PLAN OF CARE
Physical Therapy Discharge Summary    Reason for therapy discharge:    Transfer to another [facility]    Progress towards therapy goal(s). See goals on Care Plan in [facility] electronic health record for goal details.  Goals not met.  Barriers to achieving goals:   discharge from facility.    Therapy recommendation(s):    Continued therapy is recommended.  Rationale/Recommendations:  To further increase independence with mobility.

## 2021-04-26 NOTE — LETTER
Transition Communication Hand-off for Care Transitions to Next Level of Care Provider    Name: Olga Bailey  : 1945  MRN #: [id number]  Primary Care Provider: Enrique Puga     Primary Clinic: [address] [place] [id number]     Reason for Hospitalization:  Hypoxia [R09.02]  Admit Date/Time: 2021  3:07 PM  Discharge Date: 5/15/2021  Payor Source: Payor: MEDICARE / Plan: MEDICARE / Product Type: Medicare /       Reason for Communication Hand-off Referral: Other Complex medical needs.     Discharge Plan:       Concern for non-adherence with plan of care:   Y/N No  Discharge Needs Assessment:  Needs      Most Recent Value   Equipment Currently Used at Home  cane, quad   # of Referrals Placed by CTS  Post Acute Facilities          Follow-up plan:    Future Appointments   Date Time Provider Department Center   2021  6:00 AM Paula Rueda, SLP [facility]   2021  8:15 AM Jaime Yepez, PT [facility]   2021 11:00 AM Layne Barros, OT [facility]   2021  1:00 PM Inocencio Vines, PT [facility]   2021  9:30 AM [facility]   2021 10:00 AM Celeste Cedeño, OT [facility] [place] [facility]   2021  2:00 PM [facility]C [facility]   2021  2:00 PM [facility]   2021  2:00 PM [facility]   2021  2:30 PM Betsy Spann MD [facility]   2021  2:00 PM [facility]   2021  2:00 PM [facility]   2021  9:30 AM Stephanie Baker MD [facility]       Any outstanding tests or procedures:              Key Recommendations:      OPAL Hung    AVS/Discharge Summary is the source of truth; this is a helpful guide for improved communication of patient story

## 2021-04-26 NOTE — PHARMACY-CONSULT NOTE
TPN formula evaluated based on today s labs results.    The following changes have been made:    Protein: No change 72 g.  Dextrose: No [id number] g.   Sodium:  increased to 60 mEq/day .  Potassium: increased to 35 mEq/day .  Calcium:  increased to 5 mEq/day .  Magnesium: increased to 6 mEq/day .  Phosphorus: increased to 22 mmol/day .  Chloride:Acetate ratio: No change, continue 1:1  Insulin:No change - no insulin.  Trace elements:No change - 1 ml.  Multivitamins No change 10 ml.  .    Pharmacy will continue to follow and adjust as appropriate.    Kasey Sommer, PharmD, BCPS

## 2021-04-26 NOTE — CONSULTS
Radiation Oncology Brief Inpatient Consultation Note    In brief, Ms. 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 She initially presented with progressive aphasia and a MRI brain on 2/19/2021 revealed a 3.3 x 2.8 x 2.8 cm enhancing mass in left frontal-parietal region. There was a second contrast enhancing lesion (0.5 x 1.0 x 1.0 cm) in right occipital lobe which is dural based and has largely been stable in size since 9/2011, likely representing a meningioma. She underwent craniotomy for tumor resection on 2/23/2021. Post-operative MRI on 2/25/2021 found no evidence of residual disease.     She was initially planned for adjuvant chemoradiation at [facility]. This was put on hold when she developed dyspnea and was subsequently admitted inpatient at [facility] on 3/26/2021 for acute hypoxic respiratory failure, with imaging showing bilateral lung opacities. Her hospital course there was prolonged as she required high level of respiratory support without clear improvement despite empirical treatments. It was further complicated as she developed multiple DVTs, and subsequently retroperitoneal hemorrhage leading to shock. She underwent IVC filter placement.     She was transferred to [facility] on 4/26/2021. Oncology and us are consulted to see if she can proceed with her oncological care.     We reached out to the primary team and shared with them the logistics associated with chemoradiation typical in older, frail patient diagnosed with glioblastoma. Due to uncertainty of the duration of her hospital stay, and the plan of her the disposition, we hesitant to initiate treatment planning process in the event that the patient will be better served receiving treatment at another facility closer to home.     In any event, we would recommend a repeat brain MRI with and without contrast given the long interval since her previous scan on  2/25/2021. We will follow along her hospital course. If logistically the patient will remain at [facility] and will be discharged to a facility that is amenable to transportation to and from our department, we will be more than happy to see her for consultation and discuss the role of radiation in her care, in coordination with our colleagues from Oncology.    The patient was discussed and managed with on-call staff, Dr. Holt.    Dave Rios MD  Resident, PGY-3  Department of Radiation Oncology  [facility]     I reviewed the patient's chart and discussed the patient with the resident.  The patient is critically ill.  Would recommend repeat brain MRI if feasible.  Depending on her overall status, may consider a short course of radiation vs. supportive care. Plan will also depend on her expected length of stay and placement.  Will discuss with colleagues who specialize in CNS tumors.     Ramírez Holt MD

## 2021-04-27 ENCOUNTER — APPOINTMENT (OUTPATIENT)
Dept: ULTRASOUND IMAGING | Facility: CLINIC | Age: 76
DRG: 177 | End: 2021-04-27
Attending: STUDENT IN AN ORGANIZED HEALTH CARE EDUCATION/TRAINING PROGRAM
Payer: MEDICARE

## 2021-04-27 ENCOUNTER — APPOINTMENT (OUTPATIENT)
Dept: SPEECH THERAPY | Facility: CLINIC | Age: 76
DRG: 177 | End: 2021-04-27
Attending: STUDENT IN AN ORGANIZED HEALTH CARE EDUCATION/TRAINING PROGRAM
Payer: MEDICARE

## 2021-04-27 ENCOUNTER — APPOINTMENT (OUTPATIENT)
Dept: PHYSICAL THERAPY | Facility: CLINIC | Age: 76
DRG: 177 | End: 2021-04-27
Attending: STUDENT IN AN ORGANIZED HEALTH CARE EDUCATION/TRAINING PROGRAM
Payer: MEDICARE

## 2021-04-27 ENCOUNTER — APPOINTMENT (OUTPATIENT)
Dept: GENERAL RADIOLOGY | Facility: CLINIC | Age: 76
DRG: 177 | End: 2021-04-27
Attending: PHYSICIAN ASSISTANT
Payer: MEDICARE

## 2021-04-27 LAB
ALBUMIN SERPL-MCNC: 2.3 G/DL (ref 3.4–5)
ALP SERPL-CCNC: 126 U/L (ref 40–150)
ALT SERPL W P-5'-P-CCNC: 92 U/L (ref 0–50)
ANION GAP SERPL CALCULATED.3IONS-SCNC: 8 MMOL/L (ref 3–14)
AST SERPL W P-5'-P-CCNC: 37 U/L (ref 0–45)
BASE EXCESS BLDV CALC-SCNC: 2 MMOL/L
BASE EXCESS BLDV CALC-SCNC: 2.9 MMOL/L
BILIRUB DIRECT SERPL-MCNC: 0.1 MG/DL (ref 0–0.2)
BILIRUB SERPL-MCNC: 0.4 MG/DL (ref 0.2–1.3)
BUN SERPL-MCNC: 16 MG/DL (ref 7–30)
CALCIUM SERPL-MCNC: 8.5 MG/DL (ref 8.5–10.1)
CHLORIDE SERPL-SCNC: 100 MMOL/L (ref 94–109)
CO2 SERPL-SCNC: 25 MMOL/L (ref 20–32)
CREAT SERPL-MCNC: 0.36 MG/DL (ref 0.52–1.04)
CRP SERPL-MCNC: 13 MG/L (ref 0–8)
ERYTHROCYTE [DISTWIDTH] IN BLOOD BY AUTOMATED COUNT: 17.2 % (ref 10–15)
ERYTHROCYTE [SEDIMENTATION RATE] IN BLOOD BY WESTERGREN METHOD: 39 MM/H (ref 0–30)
GFR SERPL CREATININE-BSD FRML MDRD: >90 ML/MIN/{1.73_M2}
GLUCOSE BLDC GLUCOMTR-MCNC: 129 MG/DL (ref 70–99)
GLUCOSE BLDC GLUCOMTR-MCNC: 146 MG/DL (ref 70–99)
GLUCOSE BLDC GLUCOMTR-MCNC: 166 MG/DL (ref 70–99)
GLUCOSE BLDC GLUCOMTR-MCNC: 172 MG/DL (ref 70–99)
GLUCOSE BLDC GLUCOMTR-MCNC: 255 MG/DL (ref 70–99)
GLUCOSE BLDC GLUCOMTR-MCNC: 273 MG/DL (ref 70–99)
GLUCOSE SERPL-MCNC: 166 MG/DL (ref 70–99)
HCO3 BLDV-SCNC: 26 MMOL/L (ref 21–28)
HCO3 BLDV-SCNC: 28 MMOL/L (ref 21–28)
HCT VFR BLD AUTO: 26.7 % (ref 35–47)
HGB BLD-MCNC: 8.7 G/DL (ref 11.7–15.7)
INR PPP: 1.02 (ref 0.86–1.14)
LACTATE BLD-SCNC: 1.8 MMOL/L (ref 0.7–2)
LACTATE BLD-SCNC: 2.9 MMOL/L (ref 0.7–2)
MAGNESIUM SERPL-MCNC: 2.1 MG/DL (ref 1.6–2.3)
MAGNESIUM SERPL-MCNC: 2.2 MG/DL (ref 1.6–2.3)
MCH RBC QN AUTO: 29.1 PG (ref 26.5–33)
MCHC RBC AUTO-ENTMCNC: 32.6 G/DL (ref 31.5–36.5)
MCV RBC AUTO: 89 FL (ref 78–100)
NT-PROBNP SERPL-MCNC: 269 PG/ML (ref 0–1800)
O2/TOTAL GAS SETTING VFR VENT: 0 %
O2/TOTAL GAS SETTING VFR VENT: ABNORMAL %
PCO2 BLDV: 37 MM HG (ref 40–50)
PCO2 BLDV: 43 MM HG (ref 40–50)
PH BLDV: 7.42 PH (ref 7.32–7.43)
PH BLDV: 7.46 PH (ref 7.32–7.43)
PHOSPHATE SERPL-MCNC: 2.3 MG/DL (ref 2.5–4.5)
PHOSPHATE SERPL-MCNC: 3.8 MG/DL (ref 2.5–4.5)
PLATELET # BLD AUTO: 216 10E9/L (ref 150–450)
PO2 BLDV: 30 MM HG (ref 25–47)
PO2 BLDV: 35 MM HG (ref 25–47)
POTASSIUM SERPL-SCNC: 3.6 MMOL/L (ref 3.4–5.3)
POTASSIUM SERPL-SCNC: 3.7 MMOL/L (ref 3.4–5.3)
PREALB SERPL IA-MCNC: 37 MG/DL (ref 15–45)
PROCALCITONIN SERPL-MCNC: <0.05 NG/ML
PROT SERPL-MCNC: 5.2 G/DL (ref 6.8–8.8)
RBC # BLD AUTO: 2.99 10E12/L (ref 3.8–5.2)
SODIUM SERPL-SCNC: 132 MMOL/L (ref 133–144)
TROPONIN I SERPL-MCNC: 0.03 UG/L (ref 0–0.04)
WBC # BLD AUTO: 13.2 10E9/L (ref 4–11)

## 2021-04-27 PROCEDURE — 82803 BLOOD GASES ANY COMBINATION: CPT | Performed by: STUDENT IN AN ORGANIZED HEALTH CARE EDUCATION/TRAINING PROGRAM

## 2021-04-27 PROCEDURE — 82248 BILIRUBIN DIRECT: CPT | Performed by: STUDENT IN AN ORGANIZED HEALTH CARE EDUCATION/TRAINING PROGRAM

## 2021-04-27 PROCEDURE — 93970 EXTREMITY STUDY: CPT

## 2021-04-27 PROCEDURE — 82803 BLOOD GASES ANY COMBINATION: CPT | Performed by: PHYSICIAN ASSISTANT

## 2021-04-27 PROCEDURE — 99223 1ST HOSP IP/OBS HIGH 75: CPT | Mod: GC | Performed by: INTERNAL MEDICINE

## 2021-04-27 PROCEDURE — 83880 ASSAY OF NATRIURETIC PEPTIDE: CPT | Performed by: STUDENT IN AN ORGANIZED HEALTH CARE EDUCATION/TRAINING PROGRAM

## 2021-04-27 PROCEDURE — 92526 ORAL FUNCTION THERAPY: CPT | Mod: GN

## 2021-04-27 PROCEDURE — 36592 COLLECT BLOOD FROM PICC: CPT | Performed by: INTERNAL MEDICINE

## 2021-04-27 PROCEDURE — 97162 PT EVAL MOD COMPLEX 30 MIN: CPT | Mod: GP

## 2021-04-27 PROCEDURE — 250N000013 HC RX MED GY IP 250 OP 250 PS 637: Performed by: HOSPITALIST

## 2021-04-27 PROCEDURE — 120N000005 HC R&B MS OVERFLOW UMMC

## 2021-04-27 PROCEDURE — 250N000011 HC RX IP 250 OP 636: Performed by: STUDENT IN AN ORGANIZED HEALTH CARE EDUCATION/TRAINING PROGRAM

## 2021-04-27 PROCEDURE — 86140 C-REACTIVE PROTEIN: CPT | Performed by: STUDENT IN AN ORGANIZED HEALTH CARE EDUCATION/TRAINING PROGRAM

## 2021-04-27 PROCEDURE — 99223 1ST HOSP IP/OBS HIGH 75: CPT | Performed by: INTERNAL MEDICINE

## 2021-04-27 PROCEDURE — 83605 ASSAY OF LACTIC ACID: CPT | Performed by: PHYSICIAN ASSISTANT

## 2021-04-27 PROCEDURE — 84100 ASSAY OF PHOSPHORUS: CPT | Performed by: STUDENT IN AN ORGANIZED HEALTH CARE EDUCATION/TRAINING PROGRAM

## 2021-04-27 PROCEDURE — 84134 ASSAY OF PREALBUMIN: CPT | Performed by: STUDENT IN AN ORGANIZED HEALTH CARE EDUCATION/TRAINING PROGRAM

## 2021-04-27 PROCEDURE — 258N000003 HC RX IP 258 OP 636: Performed by: PHYSICIAN ASSISTANT

## 2021-04-27 PROCEDURE — 80053 COMPREHEN METABOLIC PANEL: CPT | Performed by: STUDENT IN AN ORGANIZED HEALTH CARE EDUCATION/TRAINING PROGRAM

## 2021-04-27 PROCEDURE — 85027 COMPLETE CBC AUTOMATED: CPT | Performed by: STUDENT IN AN ORGANIZED HEALTH CARE EDUCATION/TRAINING PROGRAM

## 2021-04-27 PROCEDURE — 99221 1ST HOSP IP/OBS SF/LOW 40: CPT | Performed by: INTERNAL MEDICINE

## 2021-04-27 PROCEDURE — 999N000157 HC STATISTIC RCP TIME EA 10 MIN

## 2021-04-27 PROCEDURE — 85652 RBC SED RATE AUTOMATED: CPT | Performed by: STUDENT IN AN ORGANIZED HEALTH CARE EDUCATION/TRAINING PROGRAM

## 2021-04-27 PROCEDURE — 94660 CPAP INITIATION&MGMT: CPT

## 2021-04-27 PROCEDURE — 71045 X-RAY EXAM CHEST 1 VIEW: CPT | Mod: 26 | Performed by: RADIOLOGY

## 2021-04-27 PROCEDURE — 71045 X-RAY EXAM CHEST 1 VIEW: CPT

## 2021-04-27 PROCEDURE — 93005 ELECTROCARDIOGRAM TRACING: CPT

## 2021-04-27 PROCEDURE — 84132 ASSAY OF SERUM POTASSIUM: CPT | Performed by: STUDENT IN AN ORGANIZED HEALTH CARE EDUCATION/TRAINING PROGRAM

## 2021-04-27 PROCEDURE — 92610 EVALUATE SWALLOWING FUNCTION: CPT | Mod: GN

## 2021-04-27 PROCEDURE — 85610 PROTHROMBIN TIME: CPT | Performed by: STUDENT IN AN ORGANIZED HEALTH CARE EDUCATION/TRAINING PROGRAM

## 2021-04-27 PROCEDURE — 97530 THERAPEUTIC ACTIVITIES: CPT | Mod: GP

## 2021-04-27 PROCEDURE — 83605 ASSAY OF LACTIC ACID: CPT | Performed by: INTERNAL MEDICINE

## 2021-04-27 PROCEDURE — 250N000012 HC RX MED GY IP 250 OP 636 PS 637: Performed by: STUDENT IN AN ORGANIZED HEALTH CARE EDUCATION/TRAINING PROGRAM

## 2021-04-27 PROCEDURE — 84145 PROCALCITONIN (PCT): CPT | Performed by: STUDENT IN AN ORGANIZED HEALTH CARE EDUCATION/TRAINING PROGRAM

## 2021-04-27 PROCEDURE — 250N000009 HC RX 250: Performed by: HOSPITALIST

## 2021-04-27 PROCEDURE — 36592 COLLECT BLOOD FROM PICC: CPT | Performed by: PHYSICIAN ASSISTANT

## 2021-04-27 PROCEDURE — 999N001017 HC STATISTIC GLUCOSE BY METER IP

## 2021-04-27 PROCEDURE — 83735 ASSAY OF MAGNESIUM: CPT | Performed by: STUDENT IN AN ORGANIZED HEALTH CARE EDUCATION/TRAINING PROGRAM

## 2021-04-27 PROCEDURE — 250N000013 HC RX MED GY IP 250 OP 250 PS 637: Performed by: STUDENT IN AN ORGANIZED HEALTH CARE EDUCATION/TRAINING PROGRAM

## 2021-04-27 PROCEDURE — 99222 1ST HOSP IP/OBS MODERATE 55: CPT | Mod: GC | Performed by: INTERNAL MEDICINE

## 2021-04-27 PROCEDURE — 36592 COLLECT BLOOD FROM PICC: CPT | Performed by: STUDENT IN AN ORGANIZED HEALTH CARE EDUCATION/TRAINING PROGRAM

## 2021-04-27 PROCEDURE — 84484 ASSAY OF TROPONIN QUANT: CPT | Performed by: STUDENT IN AN ORGANIZED HEALTH CARE EDUCATION/TRAINING PROGRAM

## 2021-04-27 PROCEDURE — 93970 EXTREMITY STUDY: CPT | Mod: 26 | Performed by: RADIOLOGY

## 2021-04-27 PROCEDURE — 99207 PR CDG-EXAM COMPONENT: MEETS EXP PROBLEM FOCUSED - DOWN CODED: CPT | Performed by: INTERNAL MEDICINE

## 2021-04-27 PROCEDURE — 250N000011 HC RX IP 250 OP 636

## 2021-04-27 PROCEDURE — 99233 SBSQ HOSP IP/OBS HIGH 50: CPT | Mod: GC | Performed by: HOSPITALIST

## 2021-04-27 PROCEDURE — 93010 ELECTROCARDIOGRAM REPORT: CPT | Performed by: INTERNAL MEDICINE

## 2021-04-27 PROCEDURE — 258N000003 HC RX IP 258 OP 636: Performed by: STUDENT IN AN ORGANIZED HEALTH CARE EDUCATION/TRAINING PROGRAM

## 2021-04-27 RX ORDER — PREDNISONE 20 MG/1
20 TABLET ORAL DAILY
Status: DISCONTINUED | OUTPATIENT
Start: 2021-05-04 | End: 2021-05-02

## 2021-04-27 RX ORDER — OXYCODONE HCL 5 MG/5 ML
2.5-5 SOLUTION, ORAL ORAL EVERY 4 HOURS PRN
Status: DISCONTINUED | OUTPATIENT
Start: 2021-04-27 | End: 2021-04-28

## 2021-04-27 RX ORDER — PREDNISONE 20 MG/1
60 TABLET ORAL DAILY
Status: COMPLETED | OUTPATIENT
Start: 2021-04-27 | End: 2021-04-28

## 2021-04-27 RX ORDER — LORAZEPAM 0.5 MG/1
0.5 TABLET ORAL 2 TIMES DAILY PRN
Status: DISCONTINUED | OUTPATIENT
Start: 2021-04-27 | End: 2021-04-28

## 2021-04-27 RX ORDER — HYDROXYZINE HYDROCHLORIDE 25 MG/1
25-50 TABLET, FILM COATED ORAL 3 TIMES DAILY PRN
Status: DISCONTINUED | OUTPATIENT
Start: 2021-04-27 | End: 2021-04-28

## 2021-04-27 RX ORDER — LORAZEPAM 2 MG/ML
0.5 INJECTION INTRAMUSCULAR
Status: COMPLETED | OUTPATIENT
Start: 2021-04-27 | End: 2021-04-27

## 2021-04-27 RX ORDER — PREDNISONE 20 MG/1
40 TABLET ORAL DAILY
Status: COMPLETED | OUTPATIENT
Start: 2021-05-01 | End: 2021-05-02

## 2021-04-27 RX ORDER — PREDNISONE 10 MG/1
10 TABLET ORAL DAILY
Status: DISCONTINUED | OUTPATIENT
Start: 2021-05-05 | End: 2021-05-02

## 2021-04-27 RX ORDER — LIDOCAINE 40 MG/G
CREAM TOPICAL
Status: DISCONTINUED | OUTPATIENT
Start: 2021-04-27 | End: 2021-05-15 | Stop reason: HOSPADM

## 2021-04-27 RX ORDER — PANTOPRAZOLE SODIUM 40 MG/1
40 TABLET, DELAYED RELEASE ORAL
Status: DISCONTINUED | OUTPATIENT
Start: 2021-04-27 | End: 2021-04-27

## 2021-04-27 RX ADMIN — MESALAMINE 1000 MG: 1000 SUPPOSITORY RECTAL at 22:29

## 2021-04-27 RX ADMIN — ACYCLOVIR: 50 OINTMENT TOPICAL at 15:10

## 2021-04-27 RX ADMIN — ACYCLOVIR: 50 OINTMENT TOPICAL at 12:18

## 2021-04-27 RX ADMIN — LEVETIRACETAM 250 MG: 100 INJECTION, SOLUTION, CONCENTRATE INTRAVENOUS at 22:18

## 2021-04-27 RX ADMIN — METHYLPREDNISOLONE SODIUM SUCCINATE 62.5 MG: 125 INJECTION, POWDER, FOR SOLUTION INTRAMUSCULAR; INTRAVENOUS at 09:16

## 2021-04-27 RX ADMIN — HYDROXYZINE HYDROCHLORIDE 25 MG: 25 TABLET, FILM COATED ORAL at 16:55

## 2021-04-27 RX ADMIN — LACTULOSE 3 G: 20 SOLUTION ORAL at 19:32

## 2021-04-27 RX ADMIN — INSULIN ASPART 2 UNITS: 100 INJECTION, SOLUTION INTRAVENOUS; SUBCUTANEOUS at 09:20

## 2021-04-27 RX ADMIN — LINACLOTIDE 290 MCG: 145 CAPSULE, GELATIN COATED ORAL at 09:16

## 2021-04-27 RX ADMIN — SODIUM CHLORIDE, POTASSIUM CHLORIDE, SODIUM LACTATE AND CALCIUM CHLORIDE 250 ML: 600; 310; 30; 20 INJECTION, SOLUTION INTRAVENOUS at 17:42

## 2021-04-27 RX ADMIN — LACTULOSE 3 G: 20 SOLUTION ORAL at 09:17

## 2021-04-27 RX ADMIN — ACYCLOVIR: 50 OINTMENT TOPICAL at 04:26

## 2021-04-27 RX ADMIN — PANTOPRAZOLE SODIUM 40 MG: 40 TABLET, DELAYED RELEASE ORAL at 09:24

## 2021-04-27 RX ADMIN — I.V. FAT EMULSION 250 ML: 20 EMULSION INTRAVENOUS at 19:57

## 2021-04-27 RX ADMIN — LEVETIRACETAM 250 MG: 100 INJECTION, SOLUTION, CONCENTRATE INTRAVENOUS at 09:24

## 2021-04-27 RX ADMIN — BUSPIRONE HYDROCHLORIDE 30 MG: 15 TABLET ORAL at 19:32

## 2021-04-27 RX ADMIN — Medication: at 19:53

## 2021-04-27 RX ADMIN — ACETAMINOPHEN 975 MG: 325 TABLET, FILM COATED ORAL at 01:29

## 2021-04-27 RX ADMIN — ACETAMINOPHEN 975 MG: 325 TABLET, FILM COATED ORAL at 09:16

## 2021-04-27 RX ADMIN — SULFAMETHOXAZOLE AND TRIMETHOPRIM 320 MG: 80; 16 INJECTION, SOLUTION, CONCENTRATE INTRAVENOUS at 15:10

## 2021-04-27 RX ADMIN — ACYCLOVIR: 50 OINTMENT TOPICAL at 06:21

## 2021-04-27 RX ADMIN — ACYCLOVIR: 50 OINTMENT TOPICAL at 20:00

## 2021-04-27 RX ADMIN — ENOXAPARIN SODIUM 40 MG: 100 INJECTION SUBCUTANEOUS at 16:23

## 2021-04-27 RX ADMIN — FLUOXETINE 80 MG: 20 CAPSULE ORAL at 09:24

## 2021-04-27 RX ADMIN — POLYETHYLENE GLYCOL 3350 17 G: 17 POWDER, FOR SOLUTION ORAL at 09:16

## 2021-04-27 RX ADMIN — ACETAMINOPHEN 975 MG: 325 TABLET, FILM COATED ORAL at 16:55

## 2021-04-27 RX ADMIN — AMLODIPINE BESYLATE 10 MG: 10 TABLET ORAL at 09:16

## 2021-04-27 RX ADMIN — PREDNISONE 60 MG: 20 TABLET ORAL at 16:55

## 2021-04-27 RX ADMIN — ACYCLOVIR: 50 OINTMENT TOPICAL at 09:17

## 2021-04-27 RX ADMIN — INSULIN ASPART 2 UNITS: 100 INJECTION, SOLUTION INTRAVENOUS; SUBCUTANEOUS at 17:39

## 2021-04-27 RX ADMIN — SULFAMETHOXAZOLE AND TRIMETHOPRIM 320 MG: 80; 16 INJECTION, SOLUTION, CONCENTRATE INTRAVENOUS at 06:21

## 2021-04-27 RX ADMIN — PANTOPRAZOLE SODIUM 40 MG: 40 TABLET, DELAYED RELEASE ORAL at 19:32

## 2021-04-27 RX ADMIN — LORAZEPAM 0.5 MG: 2 INJECTION INTRAMUSCULAR; INTRAVENOUS at 18:06

## 2021-04-27 RX ADMIN — SULFAMETHOXAZOLE AND TRIMETHOPRIM 320 MG: 80; 16 INJECTION, SOLUTION, CONCENTRATE INTRAVENOUS at 22:18

## 2021-04-27 RX ADMIN — BUSPIRONE HYDROCHLORIDE 30 MG: 15 TABLET ORAL at 09:16

## 2021-04-27 ASSESSMENT — ACTIVITIES OF DAILY LIVING (ADL)
ADLS_ACUITY_SCORE: 22
ADLS_ACUITY_SCORE: 21
ADLS_ACUITY_SCORE: 21
ADLS_ACUITY_SCORE: 22
DEPENDENT_IADLS:: INDEPENDENT
ADLS_ACUITY_SCORE: 21
ADLS_ACUITY_SCORE: 22

## 2021-04-27 NOTE — PLAN OF CARE
OT 6B: cancel, attempted therapy this PM 2x, with other providers upon both attempts, unable to check back with schedule demands, will reschedule.

## 2021-04-27 NOTE — PROGRESS NOTES
Speech Language Therapy Discharge Summary    Reason for therapy discharge:    Discharged to [facility]    Progress towards therapy goal(s). See goals on Care Plan in [facility] electronic health record for goal details.  Goals partially met.  Barriers to achieving goals:   discharge from facility.    Therapy recommendation(s):    Continued therapy is recommended.  Rationale/Recommendations:  see below.    Last SLP Tx Note: Increased RR/WOB noted after 2 tsps of magic cup with SLP assist.  Cough in conversation x 1.  Pt was on HFNC at 30L, saturation levels at 94% initially, then 90-91% with increased WOB reported by pt.  Recommend reducing po to 1-2 tsps at a time with RN assist from full liquid nectar thick options, hold if increased RR, decreased saturation levels, and/or need for Bipap.      Continue Tx at next facility to maximize safety for po intake.

## 2021-04-27 NOTE — PROGRESS NOTES
[facility]    Progress Note - Markianna 3 Service        Date of Admission:  4/26/2021    Assessment & Plan   Olga Bailey is a 75 y.o. F w/ GBM s/p resection (Feb 2021), depression/anxiety, HTN, asthma, and GERD, who was transferred top [facility] for consideration of inpt rad/onc treatment while she continues to be treated for complex presentation of acute hypoxic respiratory failure (due to possible PJP pneumonia and/or TRALI), 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     Acute hypoxic respiratory failure   Bilateral Pneumonia; presumed PJP  Concern for transfusion-related acute lung injury (TRALI)  Concern for R hemidiaphragm paralysis  Dyspneic/hypoxic in late March for admission at OSH. Initially treated with ceftri/doxy. ID switched to course of Zosyn/doxy with Bactrim treatment dosing for PJP (had been on steroids after admission for glioblastoma resection; Fungitell positive but Galactomannan negative). Unable to get adequate sputum samples for diagnosis of PJP. Improved with the Bactrim until several days after RP bleed (see below). W/u for PE, COVID, and respiratory viral panel was negative. Procal low. Bilateral lung opacities present. Concern for ongoing PJP infection (Bactrim was re-started), inflammatory lung condition (increased steroids), volume overload (diuresed for several days with IV Lasix). Imaging shows R hemidiaphragm elevation (concerning for paralysis?), and opacities appear to be improving as are oxygen requirements (weaned from high-flow to Oxymask 4-7L now). Pulmonology at OSH was considering bronch if no improvement, though pulm and ID here find no benefit to pursuing this now with her improvement.  - pulm, ID consults   - wean steroids: from methylpred IV 62.5 mg BID -> prednisone 60 mg x 2 d (starting 4/28) -> pred 40 mg x 2 d -> pred 30 mg x 1 d -> pred 20 mg x 1 d -> pred 10 mg x 1 d   - both agree on  holding off on bronch (this is also family/pt's preference)  - currently on Bactrim 320 mg IV q8h (was re-started at OSH on 4/19)   - pulm recommends 3 wk course of Bactrim at therapeutic dose (would be through 3/10)   - ID recommends switching to single strength Bactrim qday x 1.5 mo after steroids end  - Palliative care consulted for assistance in symptom management; appreciate recs    - Consider re-starting diuresis and/or repeating CT chest if not improving with the above   - BNP added on  - PJP stain, PCR; conventional sputum culture ordered if patient can produce sputum   - f/u blood cultures  - wean O2 as able to keep sats > 92%  - RT consult for BIPAP adjustment; try full facemask this evening  - PT, OT consults (pt's family requests this happen twice daily if possible)  - SLP consult; plan for video swallow study on 4/27; currently on dysphagia diet     Glioblastoma Multiforme  Left frontoparietal brain glioblastoma status post resection 2/23/2021.  Seen by radiation oncology 3/24 recommended XRT and chemo radiation. However subsequently admitted for resp failure. MRI brain here signs of possible disease recurrence. Hem/onc concerned that she might not be a candidate for chemotherapy or radiation currently due to poor performance status.  -Rad/onc following; appreciate recs  -Heme/onc consulted, appreciate recs   - would consider temozolomide pending clinical course (needs to improve performance status)    Bilateral lower extremity DVT  Right retroperitoneal hematoma   On 3/29 at outside hospital patient's O2 needs increased, duplex ultrasound revealed bilateral lower extremity DVT. CT PE negative for embolism.  Treated with IV heparin and transitioned to Lovenox 70 mg.  On 4/14 this was complicated by retroperitoneal bleed, requiring 4 units of packed red blood cells.  Lovenox was held, and IVC filter was placed on 4/16.  Vascular surgery was involved, and a CT abdomen was stable bleed so no surgical  intervention was required.  Eventually resumed on prophylactic 40mg of Lovenox twice daily.  -Continue 40 mg Lovenox qday  -hem/onc consulted; appreciate recs    - bilateral duplex US to assess for resolution/stability; could consider potential IVC filter removal if clots have resolved  - pain plan:              - Tylenol 975 mg q8h brianna              - oxycodone 5 mg PO q4h PRN --> decreasing to 2.5 mg q4h PO PRN to avoid delirium               - discontinued Dilaudid to avoid delirium     Ileus  Began after bleed at outside hospital 4/14; was on TPN for nutrition.  GI was consulted for assistance with bowel management.  With escalating bowel regimen finally had small BM 4/25 after tap water enema, had some liquid stool today. AXR shows non-obstructive bowel gas pattern.  -Continue prior Bowel regimen: lactulose, linaclotide, mesalamine, MiraLAX, simethicone as needed)     Non-severe malnutrition on TPN  Concern for refeeding syndrome  Hypophosphatemia   Gave 30 mmol phos on admission for level of 2.1, increased to 3.1 4/27.   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 Urine sodium 50 while her sodium was 128, certainly consistent with this. Neurologic etiology with her GBM resection likely. Consistently 130-131 recent days.   - trend Na; consider fluid restriction if any concern for worsening Na     Shock Liver, improving  LFTs sharply up after RP bleed at OSH, [place] due to shock liver. LFT's have steadily trended down since. Only AST remains elevated 107 4/26.   -Recheck CMP in am    Hyperglycemia   Steroid induced. Intermittently needed insulin at OSH.   -Low sliding scale insulin  -Gluc checks     Leukocytosis  Ddx is infection vs steroid-induced.  - trend CBC     Hypertension  Sinus tachycardia  Started on metoprolol at OSH for sinus tachycardia.  - c/t PTA amlodipine 10 mg qday  - hold  BB     Major depressive disorder, recurrent, in partial remission  Follows with psychiatry and psychology as outpatint.  Saw health psychology during admission for GBM, made plan for outpatient follow-up and med adjustment,  but was unable.  Given prolonged admission patient could benefit from inpatient consultation again and patient requesting this.  - Health psychology consult; appreciate recs   - c/t PTA Buspar, Prozac, PRN Atarax, PRN lorazepam    Chronic medical problems:  - albuterol PRN  - BIPAP at bedtime (on CPAP at home)  - GERD: back to PTA PO PPI     Diet: parenteral nutrition - ADULT compounded formula  Full Liquid Diet Nectar Thickened Liquids (pre-thickened or use instant food thickener)  Snacks/Supplements Adult: Ensure Enlive; Between Meals  parenteral nutrition - ADULT compounded formula    Fluids: Oral; received significant fluid with bacterim   Lines: PICC line  DVT Prophylaxis: Enoxaparin (Lovenox) SQ  Martino Catheter: not present  Code Status: Full Code         Disposition Plan   Expected discharge: > 7 days, recommended to transitional care unit once respiratory status improves, plan for rehab and chemo/radiation in place .  Entered: Bentley Ram 04/27/2021, 11:54 AM     The patient's care was discussed with the Attending Physician, Dr. Tomlin.    Bentley Ram  Medical Student  [address]  Please see sign in/sign out for up to date coverage information    Resident/Fellow Attestation   I, Chris Cheek, was present with the medical/RICKY student who participated in the service and in the documentation of the note.  I have verified the history and personally performed the physical exam and medical decision making.  I agree with the assessment and plan of care as documented in the note.      Chris Cheek MD  PGY2  Date of Service (when I saw the patient):  04/27/21    ______________________________________________________________________    Interval History   No acute events overnight.  Patient did not tolerate BiPAP, states that the mask she has is much less comfortable than the full face she had at outside hospital prior, requesting RT make adjustments.  Shortness of breath about the same this morning. Her abdominal discomfort and nausea are stable. No chest pain new numbness or weakness headache.    4 Point ROS obtained and negative except as noted above.     Data reviewed today: I reviewed all medications, new labs and imaging results over the last 24 hours. I personally reviewed   Physical Exam   Vital Signs: Temp: 97.8  F (36.6  C) Temp src: Oral BP: (!) 165/91 Pulse: 90   Resp: 22 SpO2: 99 % O2 Device: Nasal cannula Oxygen Delivery: 4 LPM  Weight: 163 lbs 12.83 oz   General Appearance: lying propped up in bed, with mild increased work of breathing, rouses to voice, but somnolent and falls asleep quickly   Eyes: no scleral icterus, EOMI  HEENT: neck supple, normocephalic, NC in place w 5 L O2  Respiratory: increased WOB, auscultation difficult considering body positioning, but seems to have L > R bilateral crackles in anterior fields, coarse breath sounds in apices bilaterally   Cardiovascular: S1/S2 hear and normal. no murmurs or rubs  GI: mild distension, soft, mild non-focal tender over all quadrants, bowel sounds present  Skin: scattered bruises over upper exposed upper extremities   Musculoskeletal: no pedal edema  Neurologic: moves all extremities spontaneously, follows commands. Orients to self and location, states year is 1942.    Psychiatric: somnolent     Data   Recent Labs   Lab 04/27/21  0548 04/26/21  1602 04/26/21  0615 04/25/21  0625 04/25/21  0625   WBC 13.2* 15.8*  --   --  13.6*   HGB 8.7* 9.2*  --   --  9.4*   MCV 89 88  --   --  90    220  --   --  231   INR 1.02  --   --   --   --    * 131* 130*   < > Canceled, Test credited,  specimen discarded   POTASSIUM 3.6 3.8 3.9   < > Canceled, Test credited, specimen discarded   CHLORIDE 100 100 98   < > Canceled, Test credited, specimen discarded   CO2 25 26 27   < > Canceled, Test credited, specimen discarded   BUN 16 19 18   < > Canceled, Test credited, specimen discarded   CR 0.36* 0.42* 0.42*   < > Canceled, Test credited, specimen discarded   ANIONGAP 8 6 5   < > Canceled, Test credited, specimen discarded   ESTIVEN 8.5 8.8 8.1*   < > Canceled, Test credited, specimen discarded   * 71 129*   < > Canceled, Test credited, specimen discarded   ALBUMIN 2.3* 2.4* 2.4*   < > Canceled, Test credited, specimen discarded   PROTTOTAL 5.2* 5.5* 5.4*   < > Canceled, Test credited, specimen discarded   BILITOTAL 0.4 0.4 0.4   < > Canceled, Test credited, specimen discarded   ALKPHOS 126 123 119   < > Canceled, Test credited, specimen discarded   ALT 92* 107* 106*   < > Canceled, Test credited, specimen discarded   AST 37 36 42   < > Canceled, Test credited, specimen discarded    < > = values in this interval not displayed.     Recent Results (from the past 24 hour(s))   XR Chest Port 1 View    Narrative    Exam: XR CHEST PORT 1 VIEW, 4/26/2021 5:31 PM    Indication: Transfer for acute hypoxic respiratory failure. Baseline  CXR.    Comparison: CT chest and chest x-ray 4/19/2021    Findings:   Right upper extremity PICC line tip overlies the superior cavoatrial  junction. Cardiomediastinal silhouette is enlarged. Pulmonary  vasculature is distinct. There are diffuse bilateral interstitial  opacities with some airspace opacities in the left lower lung. Likely  improved aeration in left lung since the chest x-ray dated 4/19/2021.  No pneumothorax or pleural effusions. Paucity of bowel gas within the  visualized upper abdomen. Multilevel degenerative changes visualized  in the rectal spine.      Impression    Impression:   Diffuse opacities are now primarily interstitial with less alveolar  opacities,  could represent proving impulmonary edema vs sequela from  ARDS.    I have personally reviewed the examination and initial interpretation  and I agree with the findings.    MARILIN BERG MD   XR Abdomen Port 1 View    Narrative    Exam: XR ABDOMEN PORT 1 VIEWS, 4/26/2021 5:32 PM    Indication: 75 y.o. F w/ ileus at outside hospital and persistent  abdominal pain    Comparison: CT 4/23/2021, abdominal x-ray 4/23/2021, chest x-ray  4/26/2021    Findings:   Portable supine AP radiograph of the abdomen. IVC filter.  Nonobstructive bowel gas pattern. No visualized pneumatosis or portal  venous gas. Streaky bibasilar opacities are better evaluated on  same-day chest x-ray. Degenerative changes of the spine.      Impression    Impression: Nonobstructive bowel gas pattern.    I have personally reviewed the examination and initial interpretation  and I agree with the findings.    BIA BRO, DO   MR Brain w Contrast    Narrative    Brain MRI with contrast primarily for the purposes of stereotactic  evaluation    History: Brain mass or lesion; 75 y.o. F w/ glioblastoma s/p resection  in Feb 2021. Rad onc requests follow up MRI for radiation therapy  planning.    Comparison: Brain MRI 2/25/2021    Technique: MR imaging performed with 3-dimensonally acquired axial  T1-weighted and FLAIR sequences performed with intravenous contrast.    Contrast: 7.5mL Gadavist    Findings: Limited imaging for stereotactic imaging demonstrates  postsurgical changes of left parietal craniotomy and tumor resection.  T1 hyperintensity surrounding the resection cavity appears slightly  increased compared to prior with new nodular density measuring 1.0 x  0.7 cm of the superior cavity (series 3, image 39). T1 hyperintense  lesion along the right occipital convexity measures 1.1 x 0.7 cm,  previously 1.1 x 0.6 cm. No midline shift or intracranial hemorrhage.  No hydrocephalus. The major intracranial arterial structures are  grossly patent. The left  vertebral artery is diminutive in comparison  to the right. T2 FLAIR hyperintensities of the periventricular and  subcortical white matter, nonspecific.      Impression    Impression: Postsurgical changes of left parietal craniotomy and mass  resection. There is peripheral T1 hyperintensity of the resection  cavity with focal nodule of the superior resection cavity measuring  1.0 cm, which may represent disease recurrence. Stable 1.1 cm T1  hyperintense lesion along the right occipital convexity and in the  anterior right lateral ventricle. Limited imaging primarily for the  purposes of stereotactic localization.    I have personally reviewed the examination and initial interpretation  and I agree with the findings.    NAUN HARRIS MD

## 2021-04-27 NOTE — PLAN OF CARE
Neuro: A&Ox4, forgetful at times. Afebrile.   Cardiac: SR-ST, HR 80-low 100's. VSS. SBP mildly elevated, 140's. Denies chest pain.   Respiratory: Sating >92% on 4-5L NC, tolerated BiPAP for a short time overnight. JACOBSEN, tachypneic, RR 24-28. Lung sounds clear/diminished.   GI/: Incontinent of urine and bowel x2, BM X2 overnight.   Diet/appetite: Tolerating full liquid diet with nectar thick liquids. Poor appetite, needs encouragement to eat. TPN and lipids infusing through PICC. Pills crushed in pudding. ACHS BG checks.   Activity:  Assist of 2, turned and repositioned q 2 hours in bed, on pulsate mattress.   Pain: Scheduled Tylenol given once for generalized muscle pain.   Skin: No new deficits noted.  LDA's: Right triple lumen PICC with TPN infusing at 45 mL/hr and lipids infusing at 20.8 mL/hr.     Plan: Continue with POC. Notify primary team with changes.
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
## 2021-04-27 NOTE — PROGRESS NOTES
Rapid Response Team Note    Assessment   In assessment a rapid response was called on Olga Bailey due to lactic acidosis. This presentation is likely due to pneumonia (possible PJP) and worsened by ARDS, .     Plan   -  Caution with fluids, 250cc LR bolus x 1  - repeat CXR, reviewed and shows mild interval improvement of right diffuse interstitial opacities, left lung stable significant opacities c/w ARDS  - repeat EKG is sinus tach  and stable tall inferior QRS complexes and no new ST-T wave changes  - VBG: pH 7.46, pCO2 37, pO2 30, in the setting of tachypnea  - added on troponin  - repeat lactate in 2h  -  The raymond 3 primary team was unable to be reached, paged several times.  -  Disposition: The patient will remain on the current unit. We will continue to monitor this patient closely.  -  Reassessment and plan follow-up will be performed by the rapid response team     Addendum:  -repeat lactate 1.7  - discussed with primary team/Patrick who state they have seen the patient and they are aware and will follow up on repeat trop for trop 0.03      Carlotta MNemesio Camilo PA-C  [facility] [employer] Job Code Contact #[phone]    Hospital Course   Brief Summary of events leading to rapid response:   Tachypnea and tachycardia triggers RN sepsis protocol to draw lactate which is 2.9    Patient states she is feeling anxious and endorses palpitations. She has been taking atarax and does not feel this helps and is requesting to resume her PTA ativan.     She states she is not eating much and has a poor appetite. States she has chills.     Denies chest pain, N/V. Denies fever. Denies urinary or bowel complaints, or other complaints.     Admission Diagnosis:   Hypoxia [R09.02]     Physical Exam   Temp: 97.6  F (36.4  C) Temp  Min: 97.5  F (36.4  C)  Max: 98  F (36.7  C)  Resp: 26 Resp  Min: 20  Max: 44  SpO2: 94 % SpO2  Min: 92 %  Max: 99 %  Pulse: 108 Pulse  Min: 88  Max: 109    No data recorded  BP: 138/65 Systolic  "(24hrs), Av , Min:131 , Max:165   Diastolic (24hrs), Av, Min:65, Max:91     I/Os: I/O last 3 completed shifts:  In: 2011.8 [I.V.:1530]  Out: 400 [Urine:400]     Exam:   General: chronically ill appearing  Mental Status: AAOx4.--however states the year is \"\"       Significant Results and Procedures   Lactic Acid:   Recent Labs   Lab Test 21  1711 21  1605 21  0250 21  0635 21  2341 21  2341 21  0556   LACT  --   --   --  2.0  --  1.1 0.7   LACTS 2.9* 1.5 1.2  --    < >  --   --     < > = values in this interval not displayed.     CBC:   Recent Labs   Lab Test 21  0548 21  1602 21  0625   WBC 13.2* 15.8* 13.6*   HGB 8.7* 9.2* 9.4*   HCT 26.7* 28.5* 27.6*    220 231        Sepsis Evaluation   The patient is known to have an infection.  Olga Bailey meets SIRS criteria but does NOT have a lactate >2 or other evidence of acute organ damage.  These vital sign, lab and physical exam findings are consistent with SEPSIS.    Sepsis Time-Zero (time Sepsis diagnosis confirmed):   21    Anti-infectives (From now, onward)    Start     Dose/Rate Route Frequency Ordered Stop    21 1900  sulfamethoxazole-trimethoprim (BACTRIM) 320 mg in D5W 570 mL intermittent infusion      320 mg Intravenous EVERY 8 HOURS 21 1737          Current antibiotic coverage no antibiotics are indicated at this time as there are no clinical signs of infection.     "

## 2021-04-27 NOTE — CONSULTS
Care Management Initial Consult    General Information  Assessment completed with: Patient, Children  Met with Pt Olga and dtr (La Nena) at bedside  Type of CM/SW Visit: Initial Assessment    Primary Care Provider verified and updated as needed: No   Readmission within the last 30 days:  yes     Reason for Consult: discharge planning  Advance Care Planning:    None on file    Communication Assessment  Patient's communication style: spoken language (English or Bilingual)    Hearing Difficulty or Deaf: no   Wear Glasses or Blind: yes    Cognitive  Cognitive/Neuro/Behavioral: .WDL except  Level of Consciousness: lethargic  Arousal Level: arouses to voice  Orientation: disoriented to, time  Mood/Behavior: calm, cooperative  Best Language: 0 - No aphasia  Speech: clear    Living Environment:   People in home: alone     Current living Arrangements: house      Able to return to prior arrangements: (TCU recommended)    Family/Social Support:  Care provided by: self  Provides care for: no one  Marital Status: Single  Description of Support System: Supportive, Involved    Support Assessment: Adequate family and caregiver support, Adequate social supports    Current Resources:   Patient receiving home care services: No  Community Resources: None    Employment/Financial:  Employment Status: Retired     Employment/ Comments: Retired RN  Financial Concerns: No concerns identified   Referral to Financial Counselor: No     Lifestyle & Psychosocial Needs:     Socioeconomic History     Marital status: Single     Spouse name: Not on file     Number of children: Not on file     Years of education: Not on file     Highest education level: Not on file   Occupational History     Occupation: nurse     Employer: [employer]     Tobacco Use     Smoking status: Never Smoker     Smokeless tobacco: Never Used   Substance and Sexual Activity     Alcohol use: Yes     Comment: very rare     Drug use: No     Sexual activity: Not Currently      Functional Status:  Prior to admission patient needed assistance:   Dependent ADLs:: Independent  Dependent IADLs:: Independent  Assesssment of Functional Status: Needs placement in a SNF/TCF for rehabilitation    Mental Health Status:  Mental Health Status: No Current Concerns       Chemical Dependency Status:  Chemical Dependency Status: No Current Concerns           Values/Beliefs:  Spiritual, Cultural Beliefs, Church Practices, Values that affect care: yes  Description of Beliefs that Will Affect Care: Restorationism      Discharge Planning:  Length of Stay (days): 1    Expected Discharge Date: TBD     Concerns to be Addressed: discharge planning     Patient plan of care discussed at interdisciplinary rounds: Yes    Anticipated Discharge Disposition: Transitional Care  Anticipated Discharge Services: None  Anticipated Discharge DME: None    Patient/family educated on Medicare website which has current facility and service quality ratings: no  -Pt/dtr both would like referral to  TCU  Education Provided on the Discharge Plan:  yes  Patient/Family in Agreement with the Plan: yes    Referrals Placed by CM/SW: Post Acute Facilities  Private pay costs discussed: Not applicable    Additional Information:  SW met with Pt and dtr at bedside today to introduce self, SW role and discuss discharge planning. Pt's dtr feels Pt is nowhere near ready for discharge; SW validated that Pt is not ready for discharge and that we often have these conversations in advance. La Nena was very clear that she would not want Pt to discharge to  ARU, but would want her to go to  TCU if they do not have the same physicians there. La Nena aware that Pt's radiation plan could be a barrier to community TCU placement and that oftentimes  TCU will be able to accommodate more complex cancer treatment plans. La Nena wanted to make it very clear they are not willing to put pt's cancer treatment on hold if a TCU requests that; SW  acknowledged her concerns and assured her we would work to find a TCU that can care for Pt and allow her to seek the recommended treatments.    SW initiated referral to [facility] TCU Liaison (Jolene) today. She informed SW that there are different providers from ARU to TCU. She will review Pt for possible admission when she is stable for discharge. SW to keep her updated as Pt's POC becomes clearer.    SW to continue to follow and assist with discharge plan as appropriate.     MACARIO Sorto, LICSW  6B Intermediate Care Unit   [facility]  Phone: [phone]  Pager: [phone]
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
## 2021-04-27 NOTE — PROVIDER NOTIFICATION
Patient unavailable/not in room/at procedure. Will follow up with RN and patient for Bipap consultation.

## 2021-04-27 NOTE — PROGRESS NOTES
04/27/21 1400   General Information   Onset of Illness/Injury or Date of Surgery 04/26/21   Referring Physician Chris Cheek MD   Patient/Family Therapy Goal Statement (SLP) patient's daughter hopes promote PO intake   Pertinent History of Current Problem Olga Bailey is a 75 y.o. 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   General Observations alert and cooperative initially, did demonstrate limited endurance and was more fatigued by end of session   Past History of Dysphagia Patient was seen for SLP tx for dysphagia and for cognitive linguistic intervention February, March, April 2021. She was initially on Dysphagia diet 2/thin liquids, but did advance to regular and thin liquids. Pt was downgraded to nectar thick full liquids upon recent hospital admission per bedside assessment 4/18/21. The patient's daughter reports the patient was supposed to have a videoswallow study completed, but it did not happen due to respiratory decline and transfer to [facility].   Disability/Function   Equipment Currently Used at Home none   Type of Evaluation   Type of Evaluation Swallow Evaluation   Oral Motor   Oral Musculature generally intact   Mucosal Quality adequate   Dentition (Oral Motor)   Dentition (Oral Motor) natural dentition;adequate dentition   Facial Symmetry (Oral Motor)   Comment, Facial Symmetry (Oral Motor) WFL   Lip Function (Oral Motor)   Comment, Lip Function (Oral Motor) WFL   Tongue Function (Oral Motor)   Comment, Tongue Function (Oral Motor) WFL   Jaw Function (Oral Motor)   Comment, Jaw Function (Oral Motor) WFL   Cough/Swallow/Gag Reflex (Oral Motor)   Soft Palate/Velum  (Oral Motor) WNL   Gag Reflex (Oral Motor) not tested   Volitional Throat Clear/Cough (Oral Motor) reduced strength   Volitional Swallow (Oral Motor) mildly delayed   Vocal Quality/Secretion Management (Oral Motor)   Vocal Quality (Oral Motor) WFL   General Swallowing Observations   Current Diet/Method of Nutritional Intake (General Swallowing Observations, NIS) full liquid diet;nectar-thick liquids   Respiratory Support (General Swallowing Observations) nasal cannula  (4 li)   Comment, General Swallowing Observations patient was admitted to [facility] on nectar thick full liquids. RN reports pt has been tolerating this diet without any obvious difficulty.   Clinical Swallow Evaluation   Feeding Assistance minimal assistance required   Clinical Swallow Eval: Thin Liquid Texture Trial   Mode of Presentation, Thin Liquids spoon;fed by clinician   Volume of Liquid or Food Presented 10 teaspoon amounts of thin H20   Oral Phase of Swallow WFL   Pharyngeal Phase of Swallow reduction in laryngeal movement;repeated swallows;throat clearing   Diagnostic Statement Pt became very fatigued and SOB at end of evaluation, which was at the time of thin consistency trial. Pt with repeated swallows and intermittent throat clears with H20 by spoon.   Clinical Swallow Evaluation: Nectar-Thick Liquid Texture Trial   Mode of Presentation, Nectar spoon;straw;fed by clinician   Volume of Nectar Presented 8 oz nectar thick H20   Oral Phase, Nectar WFL   Pharyngeal Phase, [place] intact   Diagnostic Statement WFL with nectar thick liquid by straw   Clinical Swallow Evaluation: Semisolid Texture Trial   Mode of Presentation, Semisolid spoon;fed by clinician   Volume of Semisolid Food Presented 4 oz applesauce, 100% consumed   Oral Phase, Semisolid WFL   Pharyngeal Phase, Semisolid repeated swallows   Diagnostic Statement Repeated swallows at time, but no overt signs/symptoms of aspiration. Appeared WFL with puree. Semi solids not trialled due to  fatigue at end of evaluation.   Esophageal Phase of Swallow   Patient reports or presents with symptoms of esophageal dysphagia No   Swallowing Recommendations   Diet Consistency Recommendations nectar-thick liquids;full liquid diet   Supervision Level for Intake close supervision needed   Mode of Delivery Recommendations bolus size, small;slow rate of intake   Swallowing Maneuver Recommendations alternate food and liquid intake   Monitoring/Assistance Required (Eating/Swallowing) stop eating activities when fatigue is present;monitor for cough or change in vocal quality with intake   Recommended Feeding/Eating Techniques (Swallow Eval) maintain upright sitting position for eating;maintain upright posture during/after eating for 30 minutes   Medication Administration Recommendations, Swallowing (SLP) whole or crushed in puree   Instrumental Assessment Recommendations VFSS (videofluroscopic swallowing study)  (planned for 1400 4/28/21)   General Therapy Interventions   Planned Therapy Interventions Dysphagia Treatment   Dysphagia treatment Oropharyngeal exercise training;Modified diet education;Instruction of safe swallow strategies   SLP Therapy Assessment/Plan   Criteria for Skilled Therapeutic Interventions Met (SLP Eval) yes;treatment indicated   SLP Diagnosis oropharyngeal dysphagia complicated by respiratory compromise   Rehab Potential (SLP Eval) good, to achieve stated therapy goals   Therapy Frequency (SLP Eval) 5 times/wk   Predicted Duration of Therapy Intervention (SLP Eval) 2 weeks   Comment, Therapy Assessment/Plan (SLP) Clinical dysphagia examination completed per MD order. The patient was admitted from OSH on a nectar thick consistency full liquid diet with plan for a videoswallow study. Per bedside assessment the patient appeared to tolerate nectar thick consistency and puree textures WFL, though this evaluation was limited by fatigue and progressive SOB. Intermittent signs/symptoms of aspiraton  present with small trials of thin liquid. Recommend the patient continue current diet level of nectar thick full liquids with safety precautions (small bites/sips, slow pacing, and upright positioning). Please withhold PO should the patient experience outward difficulty swallowing or respiratory decline. The patient's daughter is very supportive and involved in her care. She agrees with current diet level and does wish to proceed with initial plan to complete a videoswallow study, which does appear appropriate given pt's dx and respiratory status. Videoswallow study is planned for 4/28/21 at 1400 to more closely assess oropharyngeal swallow function and to more closely assess candidacy to safely advance diet level.   Therapy Plan Review/Discharge Plan (SLP)   Therapy Plan Review (SLP) evaluation/treatment results reviewed;participants included;patient;daughter   SLP Discharge Planning    SLP Discharge Recommendation (DC Rec) Transitional Care Facility   SLP Rationale for DC Rec benefits from SLP tx to improve safe swallowing    SLP Brief overview of current status  Recommend the patient continue nectar thick full liquids. SLP will complete a videoswallow study 4/28/21 to more closely assess aspiration risk and candidacy for safe diet upgrade.    Total Evaluation Time   Total Evaluation Time (Minutes) 20   Coping Strategies   Trust Relationship/Rapport care explained;choices provided;questions answered;questions encouraged;thoughts/feelings acknowledged   Wellbeing Promotion   Family/Support System Care support provided
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
## 2021-04-27 NOTE — PROVIDER NOTIFICATION
Primary team paged after writer called RRT for sepis lactate of 2.9. Primary provider and RRT team at bedside. Multiple orders placed. Pt very anxious throughout the afternoon and increasingly worse throughout this event. 1x dose of IV ativan given. Will continue to monitor pt status and update as appropriate.

## 2021-04-27 NOTE — PROGRESS NOTES
Pt lethargic this morning; arouses to voice, disoriented to time; VBG WDL; shivering and c/o being cold, afebrile, multiple warm blankets used for comfort.Denies pain; JACOBSEN with abdominal muscle use but denies SOB. On 4L NC. Poor appetite. TPN at 45ml/hr; Incontinent of both urine and stool. Continue to monitor and with POC.

## 2021-04-27 NOTE — PROGRESS NOTES
CLINICAL NUTRITION SERVICES - ASSESSMENT NOTE     Nutrition Prescription    RECOMMENDATIONS FOR MDs/PROVIDERS TO ORDER:  Due to improvement in GI tract- would recommend placement of feeding tube and initiation of enteral nutrition if patient continues to need nutrition support- see enteral recommendations below    Diet advancement per SLP     IF patient needs additional IVF- recommend non-dextrose containing     Malnutrition Status:    Unable to determine     Recommendations already ordered by Registered Dietitian (RD):  Parenteral Nutrition:   Dosing weight adjusted 56.7 kg  Volume 840 mL (35 mL/hr), Dex 200 g, AA 75 g and 250 mL 20% lipid 6x per week = 1408 kcal (~25 kcal/kg), 1.3 g/kg AA and 30% kcal from fat   --Electrolytes/additives per PharmD, recommend continuing infuvite 10 mL and MTE-4   --Triglycerides ordered for 5/3    Ordered Ensure Enlive, nectar thickened BID @ 2:00 and HS     Future/Additional Recommendations:  Monitor ability to further advance diet order and tolerance of oral intake   If enteral nutrition becomes plan of care: recommend starting Osmolite 1.5 @ 15 ml/hr and advance by 15 Ml q 6 hours to goal.   Osmolite 1.5 Claudio @ goal of 45ml/hr  (1080ml/day)  will provide: 1620 kcals, 67 g PRO, 822 ml free H20, 219 g CHO, and 0 g fiber daily.    Addendum: 4/28- noted inconsistency in total volume and mL/hr, corrected      REASON FOR ASSESSMENT  Olga Bailey is a/an 75 year old female assessed by the dietitian for Pharmacy/Nutrition to Start and Manage PN    CLINICAL HISTORY   Glioblastoma Multiforme s/p resection (Feb 2021), depression/anxiety,HTN, asthma, and GERD, who presents as transfer for consideration of inpt rad/onc treatment while she continues to be treated for complex presentation of acute hypoxic respiratory failure (due to possible PJP pneumonia and/or TRALI), 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     NUTRITION HISTORY  Patient at OSH  "3/26-4/26 (tranferred to [place]).   TPN was initiated on 4/1, 960 mL, D15% AA5% and 250 mL 20% lipid every other day =  932 kcals (17 kcal/kg), 48 gm pro (0.9 gm/kg), 144 gm CHO, 27% fat with plan to increase to 1440 mL/day, D15% AA5% + 250 mL lipid daily= 1522 kcals (27 kcal/kg), 72 gm pro (1.3 gm/kg), 216 gm CHO, 33% fat.     TPN was stopped on 4/9. Note 4/10 notes diet order advanced to regular and ensure shake scheduled BID + Magic cup. Calorie count 4/7-4/9 provided 1925 cals (100% est needs) and 80 gm pro (100% est needs).  She liked ensure, did not like magic cup     On 4/14, made NPO due to ileus 2/2 GI bleed. TPN restarted on 4/17 @ 30 ml/hr TPN D15 AA5 + 250 mL lipid daily with goal rate of 60 ml/xu=6926 kcals (27 kcal/kg), 72 gm pro (1.3 gm/kg), 216 gm CHO, 33% fat    Patient seen on 4/26 with note indicating that TPN was at 45 mL/hr on 4/22, D20%, AA 6.7% + 250 mL Lipid daily  = 1522 kcal Infuvite 10 + MTE-4 and dosing weight of 55.1 kg    Patient did not respond to writer today. Very cold with multiple blankets on/fetal position, did not move for NFPE.     CURRENT NUTRITION ORDERS  Diet: Full Liquid, nectar thickened   Intake/Tolerance: 50%     LABS  Na 132 (L)  Creatinine 0.36 (L)  Glucose 123-255 (past 4)   Triglycerides (4/1, 94)     MEDICATIONS  Novolog  Lactulose  Methylprednisolone    ANTHROPOMETRICS  Height: 160 cm (5' 3\")  Most Recent Weight: 74.3 kg (163 lb 12.8 oz)    IBW: 52.3 kg  BMI: Overweight BMI 25-29.9  Weight History:   Wt Readings from Last 15 Encounters:   04/27/21 74.3 kg (163 lb 12.8 oz)   04/26/21 74.4 kg (164 lb 0.4 oz)   03/23/21 70 kg (154 lb 6.4 oz)   03/08/21 69.9 kg (154 lb)   02/26/21 74.4 kg (164 lb 1.6 oz)   11/20/15 68 kg (150 lb)   01/22/08 69.9 kg (154 lb)   08/15/07 71.2 kg (157 lb)   01/29/07 68 kg (150 lb)   01/09/07 67.6 kg (149 lb)   10/31/06 70.8 kg (156 lb)   01/05/06 69.8 kg (153 lb 12.8 oz)   01/03/06 69.8 kg (153 lb 12.8 oz)   12/21/04 65.3 kg (144 lb) "   06/18/04 66.7 kg (147 lb)     Dosing Weight: 56.7 kg (adjusted)     ASSESSED NUTRITION NEEDS  RMR (MSJ) 1164 per weight 70 kg   Estimated Energy Needs: 4322-5738-2927 kcals/day (20 - 25 -30 kcals/kg)  Justification: Maintenance, lower/mid range with TPN   Estimated Protein Needs: 69-85 grams protein/day (1.2 - 1.5 grams of pro/kg)  Justification: Increased needs  Estimated Fluid Needs: 6990-7093 mL/day (20-25 ml/kg)   Justification: Maintenance and Per provider pending fluid status    PHYSICAL FINDINGS  See malnutrition section below.  Xray 4/26 showed nonobstructive bowel gas pattern compared with CT from 4/23.     MALNUTRITION  % Intake: Decreased intake does not meet criteria  % Weight Loss: None noted  Subcutaneous Fat Loss: Unable to assess  Muscle Loss: Unable to assess  Fluid Accumulation/Edema: None noted  Malnutrition Diagnosis: Unable to determine due to inability to complete all parameters at this time     NUTRITION DIAGNOSIS  Inadequate oral intake related to altered GI function as evidenced by recent ileus and NPO status with start of TPN, now with improvement in ileus per imaging       INTERVENTIONS  Implementation  Nutrition Education: Not appropriate at this time due to patient condition   Collaboration with other providers  Parenteral Nutrition/IV Fluids - Initiate   Medical Food Supplement     Goals  Total avg nutritional intake to meet a minimum of 20 kcal/kg and 1.2 g PRO/kg daily (per dosing wt 56.7 kg).     Monitoring/Evaluation  Progress toward goals will be monitored and evaluated per protocol.    Chaya Fontana RD, BENITO  6B pager: [phone]

## 2021-04-27 NOTE — PHARMACY-ADMISSION MEDICATION HISTORY
Admission Medication History Completed by Pharmacy    See [facility] Admission Navigator for allergy information, preferred outpatient pharmacy, prior to admission medications and immunization status.     Medication History Sources:     MAR from [facility]     Discharge summary from [facility]     Medication history note completed 3/27/21 by pharmacist     Changes made to PTA medication list (reason):    Added: None    Deleted: levalbuterol nebs     Changed: acetaminophen 500mg BID prn --> 650mg q4h prn    Additional Information:    Patient is a direct tranfer from [facility] where she was admitted 3/26/21    Patient was started on TPN during admission. Formula is available in Epic      Prior to Admission medications    Medication Sig Last Dose Taking? Auth Provider   acetaminophen (TYLENOL) 325 MG tablet Take 650 mg by mouth every 4 hours as needed for mild pain  4/25/2021 at Unknown time Yes Unknown, Entered By History   acyclovir (ZOVIRAX) 5 % external ointment Apply topically every 3 hours 4/26/2021 at 1325 Yes Duke Bernabe MD   albuterol (PROAIR HFA/PROVENTIL HFA/VENTOLIN HFA) 108 (90 Base) MCG/ACT inhaler Inhale 2 puffs into the lungs every 4 hours as needed for wheezing prn Yes Doug Almaguer MD   albuterol (PROVENTIL) (2.5 MG/3ML) 0.083% neb solution Take 1 vial (2.5 mg) by nebulization every 4 hours as needed for wheezing or shortness of breath / dyspnea prn Yes Duke Bernabe MD   amLODIPine (NORVASC) 10 MG tablet Take 1 tablet (10 mg) by mouth daily 4/26/2021 at am Yes Kary Munoz PA   bisacodyl (DULCOLAX) 10 MG suppository Place 1 suppository (10 mg) rectally daily as needed for constipation Past Week at Unknown time Yes Duke Bernabe MD   busPIRone HCl (BUSPAR) 30 MG tablet Take 30 mg by mouth 2 times daily 4/26/2021 at am Yes Unknown, Entered By History   carboxymethylcellulose PF (REFRESH PLUS) 0.5 % ophthalmic solution Place 1 drop into both eyes every hour as needed  for dry eyes prn Yes Duke Bernabe MD   enoxaparin ANTICOAGULANT (LOVENOX) 40 MG/0.4ML syringe Inject 0.4 mLs (40 mg) Subcutaneous every 24 hours 4/25/2021 at 1525 Yes Duke Bernabe MD   FLUoxetine (PROZAC) 20 MG capsule Take 80 mg by mouth daily 4/26/2021 at am Yes Unknown, Entered By History   hydrOXYzine (ATARAX) 25 MG tablet Take 1-2 tablets (25-50 mg) by mouth every 6 hours as needed for anxiety or other (sleep) 4/25/2021 at Unknown time Yes Kary Munoz, PA   insulin aspart (NOVOLOG VIAL) 100 UNITS/ML vial Novolog Flexpen  If Pre-meal Glucose  140 -164 give 1 unit  165 -189 give 2 units  190 -214 give 3 units  215 -239 give 4 units  240 -264 give 5 units  265 -289 give 6 units  290 -314 give 7 units  315 -339 give 8 units  340+ give 9 units    If Bedtime Glucose  200 -214 give 1 unit  215 -264 give 2 units  265 -314 give 3 units  315+ give 4 units 4/26/2021 at 1325 Yes Doug Almaguer MD   lactulose (CHRONULAC) 10 GM/15ML solution Take 4.5 mLs (3 g) by mouth 2 times daily 4/26/2021 at am Yes Duke Bernabe MD   levETIRAcetam (KEPPRA) 250 MG tablet Take 1 tablet (250 mg) by mouth 2 times daily for 2 days 4/26/2021 at am Yes Duke Bernabe MD   linaclotide (LINZESS) 290 MCG capsule Take 1 capsule (290 mcg) by mouth every morning (before breakfast) 4/26/2021 at am Yes Duke Bernabe MD   lipids (INTRALIPID) 20 % infusion Inject 250 mLs into the vein every 24 hours 4/25/2021 at 2000 Yes Duke Bernabe MD   LORazepam (ATIVAN) 0.5 MG tablet Take 1 tablet (0.5 mg) by mouth 2 times daily as needed for anxiety 4/25/2021 at Unknown time Yes Duke Bernabe MD   mesalamine (CANASA) 1000 MG suppository Place 1 suppository (1,000 mg) rectally At Bedtime 4/25/2021 at pm Yes Duke Bernabe MD   methylPREDNISolone sodium succinate (SOLU-MEDROL) 125 mg/2 mL injection Inject 1 mL (62.5 mg) into the vein every 12 hours 4/26/2021 at am Yes Duke Bernabe MD    metoprolol (LOPRESSOR) 5 MG/5ML injection Inject 2.5 mLs (2.5 mg) into the vein every 6 hours 4/26/2021 at 0800 Yes Duke Bernabe MD   pantoprazole (PROTONIX) 40 mg IV push injection Inject 40 mg into the vein 2 times daily (with meals) 4/26/2021 at am Yes Duke Bernabe MD   parenteral nutrition - PTA/DISCHARGE ORDER The TPN formula will print on the After Visit Summary Report. 4/25/2021 at 2000 Yes Duke Bernabe MD   polyethylene glycol (MIRALAX) 17 g packet Take 17 g by mouth daily 4/26/2021 at am Yes Duke Bernabe MD   polyethylene glycol (MIRALAX) 17 g packet Take 17 g by mouth daily as needed (constipation) prn Yes Duke Bernabe MD   senna-docusate (SENOKOT-S/PERICOLACE) 8.6-50 MG tablet Take 2 tablets by mouth 2 times daily 4/26/2021 at am Yes Duke Bernabe MD   simethicone (MYLICON) 40 MG/0.6ML suspension Take 0.6 mLs (40 mg) by mouth every 6 hours as needed for cramping 4/25/2021 at Unknown time Yes Duke Bernabe MD   sulfamethoxazole-trimethoprim 320 mg Inject 320 mg into the vein every 12 hours 4/26/2021 at 0800 Yes Duke Bernabe MD       Date completed: 04/26/21    Medication history completed by:   Raegan Dunlap, PharmD  Pager: [phone]

## 2021-04-27 NOTE — PROGRESS NOTES
04/27/21 0821   Quick Adds   Type of Visit Initial PT Evaluation   Student Supervision-Eval Only    Student Supervision On-site supervision provided;Direct supervision provided   Living Environment   People in home alone   Current Living Arrangements house   Home Accessibility stairs to enter home   Number of Stairs, Main Entrance 3   Stair Railings, Main Entrance none   Number of Stairs, Within Home, Primary other (see comments)  (pt reported no stairs to get into single story home this am?)   Transportation Anticipated family or friend will provide   Living Environment Comments daughter planning on taking patient to her home if not total cares   Self-Care   Usual Activity Tolerance good   Current Activity Tolerance poor   Activity/Exercise/Self-Care Comment was mod I using cane; living with daughter after ARU stay   Disability/Function   Hearing Difficulty or Deaf no   Wear Glasses or Blind yes   Vision Management glasses   Concentrating, Remembering or Making Decisions Difficulty no   Difficulty Communicating no   Difficulty Eating/Swallowing yes   Eating/Swallowing swallowing liquids;swallowing solid food   Walking or Climbing Stairs ambulation difficulty, dependent;stair climbing difficulty, dependent;transferring difficulty, dependent   Mobility Management uses cane , lives w her dtr for now recent surgery feb 23rd   Dressing/Bathing Difficulty yes   Dressing/Bathing bathing difficulty, dependent;dressing difficulty, dependent   Toileting issues yes   Toileting Management [place]   Toileting Assistance toileting difficulty, dependent   Doing Errands Independently Difficulty (such as shopping) yes   Fall history within last six months yes   Number of times patient has fallen within last six months 1   Change in Functional Status Since Onset of Current Illness/Injury yes   General Information   Onset of Illness/Injury or Date of Surgery 04/26/21  (admit date)   Referring Physician Duke Bernabe MD  "  Patient/Family Therapy Goals Statement (PT) return to daughter's home   Pertinent History of Current Problem (include personal factors and/or comorbidities that impact the POC) Per chart: \"Olga Bailey is a 75 y.o. F w/ GBM s/p resection (Feb 2021), depression/anxiety, HTN, asthma, and GERD, who presents as transfer for consideration of inpt rad/onc treatment while she continues to be treated for complex presentation of acute hypoxic respiratory failure (due to possible PJP pneumonia and/or TRALI), 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\"   Existing Precautions/Restrictions fall   General Observations Pt in bed, early morning is tired, schedule later am if possilbe. Cheif complaint during session, feeling cold and shortness of breathe   Cognition   Affect/Mental Status (Cognition) WFL   Follows Commands (Cognition) WFL   Cognitive Status Comments Answered all my questions appropriately but in chart some of the answers differ from previously entered data.   Pain Assessment   Patient Currently in Pain No   Integumentary/Edema   Integumentary/Edema no deficits were identifed   Posture    Posture Protracted shoulders;Kyphosis   Posture Comments leans R in sitting   Range of Motion (ROM)   ROM Comment WFL   Strength   Strength Comments significant functional weakness; poor activity tolerance; some residual R UE weakness per daughter after craniotomy   Bed Mobility   Comment (Bed Mobility) Min A for bed mobility; mod A x 1 for sitting EOB   Transfers   Transfer Safety Comments unable to tolerate this session. wanted to return back to bed after getting to EOB.   Gait/Stairs (Locomotion)   Comment (Gait/Stairs) Unable to tolerate at time of eval   Balance   Balance Comments Leans R in sitting.   Sensory Examination   Sensory Perception patient reports no sensory changes   Clinical Impression   Criteria for Skilled Therapeutic Intervention yes, treatment indicated   PT Diagnosis (PT) " Impaired function    Influenced by the following impairments weakness; impaired balance; decreased activity tolerance; need for O2   Functional limitations due to impairments impaired independence with functional mobility   Clinical Presentation Evolving/Changing   Clinical Presentation Rationale clinical judgement; level of assist   Clinical Decision Making (Complexity) moderate complexity   Therapy Frequency (PT) 5x/week   Predicted Duration of Therapy Intervention (days/wks) 1 week   Planned Therapy Interventions (PT) balance training;bed mobility training;gait training;strengthening;stair training;transfer training;patient/family education   Anticipated Equipment Needs at Discharge (PT) wheelchair;walker, rolling   Risk & Benefits of therapy have been explained patient;evaluation/treatment results reviewed;care plan/treatment goals reviewed   Clinical Impression Comments Patient significantly below recent baseline since discharge from ARU   PT Discharge Planning    PT Discharge Recommendation (DC Rec) Transitional Care Facility   PT Rationale for DC Rec Pt is weak with poor activity tolerance and O2 needs. More rehab beneficial for increased activity tolerance and reduced O2 needs prior to d/ c home.   PT Brief overview of current status  Mod assist x 1 bed mobility, tolerates sitting at EOB x 3 minutes   Total Evaluation Time   Total Evaluation Time (Minutes) 10

## 2021-04-28 ENCOUNTER — APPOINTMENT (OUTPATIENT)
Dept: SPEECH THERAPY | Facility: CLINIC | Age: 76
DRG: 177 | End: 2021-04-28
Attending: STUDENT IN AN ORGANIZED HEALTH CARE EDUCATION/TRAINING PROGRAM
Payer: MEDICARE

## 2021-04-28 ENCOUNTER — APPOINTMENT (OUTPATIENT)
Dept: GENERAL RADIOLOGY | Facility: CLINIC | Age: 76
DRG: 177 | End: 2021-04-28
Attending: STUDENT IN AN ORGANIZED HEALTH CARE EDUCATION/TRAINING PROGRAM
Payer: MEDICARE

## 2021-04-28 ENCOUNTER — APPOINTMENT (OUTPATIENT)
Dept: GENERAL RADIOLOGY | Facility: CLINIC | Age: 76
DRG: 177 | End: 2021-04-28
Attending: PHYSICIAN ASSISTANT
Payer: MEDICARE

## 2021-04-28 ENCOUNTER — OFFICE VISIT (OUTPATIENT)
Dept: RADIATION ONCOLOGY | Facility: CLINIC | Age: 76
DRG: 177 | End: 2021-04-28
Attending: RADIOLOGY
Payer: MEDICARE

## 2021-04-28 ENCOUNTER — TELEPHONE (OUTPATIENT)
Dept: ONCOLOGY | Facility: CLINIC | Age: 76
End: 2021-04-28

## 2021-04-28 ENCOUNTER — APPOINTMENT (OUTPATIENT)
Dept: CARDIOLOGY | Facility: CLINIC | Age: 76
DRG: 177 | End: 2021-04-28
Attending: STUDENT IN AN ORGANIZED HEALTH CARE EDUCATION/TRAINING PROGRAM
Payer: MEDICARE

## 2021-04-28 DIAGNOSIS — C71.9 GLIOBLASTOMA (H): Primary | ICD-10-CM

## 2021-04-28 LAB
ALBUMIN SERPL-MCNC: 2.5 G/DL (ref 3.4–5)
ALP SERPL-CCNC: 116 U/L (ref 40–150)
ALT SERPL W P-5'-P-CCNC: 82 U/L (ref 0–50)
ANION GAP SERPL CALCULATED.3IONS-SCNC: 6 MMOL/L (ref 3–14)
AST SERPL W P-5'-P-CCNC: 35 U/L (ref 0–45)
BASE EXCESS BLDA CALC-SCNC: 0.9 MMOL/L
BILIRUB SERPL-MCNC: 0.3 MG/DL (ref 0.2–1.3)
BUN SERPL-MCNC: 16 MG/DL (ref 7–30)
CALCIUM SERPL-MCNC: 8.7 MG/DL (ref 8.5–10.1)
CHLORIDE SERPL-SCNC: 102 MMOL/L (ref 94–109)
CO2 SERPL-SCNC: 26 MMOL/L (ref 20–32)
CREAT SERPL-MCNC: 0.4 MG/DL (ref 0.52–1.04)
CRP SERPL-MCNC: 10 MG/L (ref 0–8)
ERYTHROCYTE [DISTWIDTH] IN BLOOD BY AUTOMATED COUNT: 17.8 % (ref 10–15)
GFR SERPL CREATININE-BSD FRML MDRD: >90 ML/MIN/{1.73_M2}
GLUCOSE BLDC GLUCOMTR-MCNC: 150 MG/DL (ref 70–99)
GLUCOSE BLDC GLUCOMTR-MCNC: 152 MG/DL (ref 70–99)
GLUCOSE BLDC GLUCOMTR-MCNC: 153 MG/DL (ref 70–99)
GLUCOSE BLDC GLUCOMTR-MCNC: 170 MG/DL (ref 70–99)
GLUCOSE SERPL-MCNC: 155 MG/DL (ref 70–99)
HCO3 BLD-SCNC: 25 MMOL/L (ref 21–28)
HCT VFR BLD AUTO: 29.1 % (ref 35–47)
HGB BLD-MCNC: 9.3 G/DL (ref 11.7–15.7)
INTERPRETATION ECG - MUSE: NORMAL
INTERPRETATION ECG - MUSE: NORMAL
LACTATE BLD-SCNC: 2.1 MMOL/L (ref 0.7–2)
LACTATE BLD-SCNC: 3 MMOL/L (ref 0.7–2)
MAGNESIUM SERPL-MCNC: 2.3 MG/DL (ref 1.6–2.3)
MAGNESIUM SERPL-MCNC: 2.8 MG/DL (ref 1.6–2.3)
MCH RBC QN AUTO: 29.1 PG (ref 26.5–33)
MCHC RBC AUTO-ENTMCNC: 32 G/DL (ref 31.5–36.5)
MCV RBC AUTO: 91 FL (ref 78–100)
O2/TOTAL GAS SETTING VFR VENT: 50 %
PCO2 BLD: 36 MM HG (ref 35–45)
PH BLD: 7.45 PH (ref 7.35–7.45)
PHOSPHATE SERPL-MCNC: 3.2 MG/DL (ref 2.5–4.5)
PHOSPHATE SERPL-MCNC: 6.9 MG/DL (ref 2.5–4.5)
PLATELET # BLD AUTO: 219 10E9/L (ref 150–450)
PO2 BLD: 90 MM HG (ref 80–105)
POTASSIUM SERPL-SCNC: 3.8 MMOL/L (ref 3.4–5.3)
POTASSIUM SERPL-SCNC: 5.3 MMOL/L (ref 3.4–5.3)
PROCALCITONIN SERPL-MCNC: 0.05 NG/ML
PROT SERPL-MCNC: 5.4 G/DL (ref 6.8–8.8)
RBC # BLD AUTO: 3.2 10E12/L (ref 3.8–5.2)
SODIUM SERPL-SCNC: 134 MMOL/L (ref 133–144)
TROPONIN I SERPL-MCNC: 0.04 UG/L (ref 0–0.04)
TROPONIN I SERPL-MCNC: 0.04 UG/L (ref 0–0.04)
TROPONIN I SERPL-MCNC: 0.05 UG/L (ref 0–0.04)
WBC # BLD AUTO: 13.7 10E9/L (ref 4–11)

## 2021-04-28 PROCEDURE — 36415 COLL VENOUS BLD VENIPUNCTURE: CPT | Performed by: HOSPITALIST

## 2021-04-28 PROCEDURE — 258N000003 HC RX IP 258 OP 636: Performed by: STUDENT IN AN ORGANIZED HEALTH CARE EDUCATION/TRAINING PROGRAM

## 2021-04-28 PROCEDURE — 84145 PROCALCITONIN (PCT): CPT | Performed by: PHYSICIAN ASSISTANT

## 2021-04-28 PROCEDURE — 99233 SBSQ HOSP IP/OBS HIGH 50: CPT | Performed by: INTERNAL MEDICINE

## 2021-04-28 PROCEDURE — 250N000011 HC RX IP 250 OP 636: Performed by: STUDENT IN AN ORGANIZED HEALTH CARE EDUCATION/TRAINING PROGRAM

## 2021-04-28 PROCEDURE — 250N000013 HC RX MED GY IP 250 OP 250 PS 637

## 2021-04-28 PROCEDURE — 80053 COMPREHEN METABOLIC PANEL: CPT | Performed by: HOSPITALIST

## 2021-04-28 PROCEDURE — 86140 C-REACTIVE PROTEIN: CPT | Performed by: PHYSICIAN ASSISTANT

## 2021-04-28 PROCEDURE — 36592 COLLECT BLOOD FROM PICC: CPT | Performed by: PHYSICIAN ASSISTANT

## 2021-04-28 PROCEDURE — 92611 MOTION FLUOROSCOPY/SWALLOW: CPT | Mod: GN

## 2021-04-28 PROCEDURE — 84484 ASSAY OF TROPONIN QUANT: CPT | Performed by: PHYSICIAN ASSISTANT

## 2021-04-28 PROCEDURE — 74230 X-RAY XM SWLNG FUNCJ C+: CPT | Mod: 26 | Performed by: RADIOLOGY

## 2021-04-28 PROCEDURE — 71045 X-RAY EXAM CHEST 1 VIEW: CPT

## 2021-04-28 PROCEDURE — 82962 GLUCOSE BLOOD TEST: CPT

## 2021-04-28 PROCEDURE — 99291 CRITICAL CARE FIRST HOUR: CPT | Performed by: PHYSICIAN ASSISTANT

## 2021-04-28 PROCEDURE — 85027 COMPLETE CBC AUTOMATED: CPT | Performed by: HOSPITALIST

## 2021-04-28 PROCEDURE — 93010 ELECTROCARDIOGRAM REPORT: CPT | Performed by: INTERNAL MEDICINE

## 2021-04-28 PROCEDURE — 87040 BLOOD CULTURE FOR BACTERIA: CPT | Performed by: PHYSICIAN ASSISTANT

## 2021-04-28 PROCEDURE — 83605 ASSAY OF LACTIC ACID: CPT | Performed by: STUDENT IN AN ORGANIZED HEALTH CARE EDUCATION/TRAINING PROGRAM

## 2021-04-28 PROCEDURE — 84132 ASSAY OF SERUM POTASSIUM: CPT | Performed by: STUDENT IN AN ORGANIZED HEALTH CARE EDUCATION/TRAINING PROGRAM

## 2021-04-28 PROCEDURE — 93005 ELECTROCARDIOGRAM TRACING: CPT

## 2021-04-28 PROCEDURE — 99233 SBSQ HOSP IP/OBS HIGH 50: CPT | Mod: GC | Performed by: HOSPITALIST

## 2021-04-28 PROCEDURE — 74230 X-RAY XM SWLNG FUNCJ C+: CPT

## 2021-04-28 PROCEDURE — 77334 RADIATION TREATMENT AID(S): CPT | Mod: 26 | Performed by: RADIOLOGY

## 2021-04-28 PROCEDURE — 3E0436Z INTRODUCTION OF NUTRITIONAL SUBSTANCE INTO CENTRAL VEIN, PERCUTANEOUS APPROACH: ICD-10-PCS | Performed by: INTERNAL MEDICINE

## 2021-04-28 PROCEDURE — 258N000003 HC RX IP 258 OP 636: Performed by: HOSPITALIST

## 2021-04-28 PROCEDURE — 250N000012 HC RX MED GY IP 250 OP 636 PS 637: Performed by: STUDENT IN AN ORGANIZED HEALTH CARE EDUCATION/TRAINING PROGRAM

## 2021-04-28 PROCEDURE — 92526 ORAL FUNCTION THERAPY: CPT | Mod: GN

## 2021-04-28 PROCEDURE — 999N000157 HC STATISTIC RCP TIME EA 10 MIN

## 2021-04-28 PROCEDURE — 83605 ASSAY OF LACTIC ACID: CPT | Performed by: PHYSICIAN ASSISTANT

## 2021-04-28 PROCEDURE — 83735 ASSAY OF MAGNESIUM: CPT | Performed by: STUDENT IN AN ORGANIZED HEALTH CARE EDUCATION/TRAINING PROGRAM

## 2021-04-28 PROCEDURE — 120N000005 HC R&B MS OVERFLOW UMMC

## 2021-04-28 PROCEDURE — 83735 ASSAY OF MAGNESIUM: CPT | Performed by: HOSPITALIST

## 2021-04-28 PROCEDURE — 84100 ASSAY OF PHOSPHORUS: CPT | Performed by: STUDENT IN AN ORGANIZED HEALTH CARE EDUCATION/TRAINING PROGRAM

## 2021-04-28 PROCEDURE — 93325 DOPPLER ECHO COLOR FLOW MAPG: CPT | Mod: 26 | Performed by: INTERNAL MEDICINE

## 2021-04-28 PROCEDURE — 250N000011 HC RX IP 250 OP 636

## 2021-04-28 PROCEDURE — 999N001017 HC STATISTIC GLUCOSE BY METER IP

## 2021-04-28 PROCEDURE — 36592 COLLECT BLOOD FROM PICC: CPT | Performed by: STUDENT IN AN ORGANIZED HEALTH CARE EDUCATION/TRAINING PROGRAM

## 2021-04-28 PROCEDURE — 93308 TTE F-UP OR LMTD: CPT | Mod: 26 | Performed by: INTERNAL MEDICINE

## 2021-04-28 PROCEDURE — 84100 ASSAY OF PHOSPHORUS: CPT | Performed by: HOSPITALIST

## 2021-04-28 PROCEDURE — 77334 RADIATION TREATMENT AID(S): CPT | Performed by: RADIOLOGY

## 2021-04-28 PROCEDURE — 255N000002 HC RX 255 OP 636: Performed by: STUDENT IN AN ORGANIZED HEALTH CARE EDUCATION/TRAINING PROGRAM

## 2021-04-28 PROCEDURE — 71045 X-RAY EXAM CHEST 1 VIEW: CPT | Mod: 26 | Performed by: RADIOLOGY

## 2021-04-28 PROCEDURE — 250N000013 HC RX MED GY IP 250 OP 250 PS 637: Performed by: HOSPITALIST

## 2021-04-28 PROCEDURE — 84484 ASSAY OF TROPONIN QUANT: CPT | Performed by: HOSPITALIST

## 2021-04-28 PROCEDURE — 250N000009 HC RX 250: Performed by: HOSPITALIST

## 2021-04-28 PROCEDURE — C9113 INJ PANTOPRAZOLE SODIUM, VIA: HCPCS

## 2021-04-28 PROCEDURE — 250N000013 HC RX MED GY IP 250 OP 250 PS 637: Performed by: STUDENT IN AN ORGANIZED HEALTH CARE EDUCATION/TRAINING PROGRAM

## 2021-04-28 PROCEDURE — 82803 BLOOD GASES ANY COMBINATION: CPT | Performed by: PHYSICIAN ASSISTANT

## 2021-04-28 PROCEDURE — 93321 DOPPLER ECHO F-UP/LMTD STD: CPT | Mod: 26 | Performed by: INTERNAL MEDICINE

## 2021-04-28 PROCEDURE — 93321 DOPPLER ECHO F-UP/LMTD STD: CPT

## 2021-04-28 PROCEDURE — 99233 SBSQ HOSP IP/OBS HIGH 50: CPT | Mod: GC | Performed by: INTERNAL MEDICINE

## 2021-04-28 PROCEDURE — 77263 THER RADIOLOGY TX PLNG CPLX: CPT | Performed by: RADIOLOGY

## 2021-04-28 PROCEDURE — 250N000011 HC RX IP 250 OP 636: Performed by: PHYSICIAN ASSISTANT

## 2021-04-28 RX ORDER — BARIUM SULFATE 400 MG/ML
10 SUSPENSION ORAL ONCE
Status: COMPLETED | OUTPATIENT
Start: 2021-04-28 | End: 2021-04-28

## 2021-04-28 RX ORDER — LORAZEPAM 2 MG/ML
0.5 INJECTION INTRAMUSCULAR 3 TIMES DAILY PRN
Status: DISCONTINUED | OUTPATIENT
Start: 2021-04-28 | End: 2021-04-29

## 2021-04-28 RX ORDER — LORAZEPAM 0.5 MG/1
0.5 TABLET ORAL 3 TIMES DAILY PRN
Status: DISCONTINUED | OUTPATIENT
Start: 2021-04-28 | End: 2021-04-29

## 2021-04-28 RX ORDER — LORAZEPAM 2 MG/ML
0.5 INJECTION INTRAMUSCULAR ONCE
Status: COMPLETED | OUTPATIENT
Start: 2021-04-28 | End: 2021-04-28

## 2021-04-28 RX ORDER — HYDROXYZINE HYDROCHLORIDE 25 MG/1
25-50 TABLET, FILM COATED ORAL 3 TIMES DAILY PRN
Status: DISCONTINUED | OUTPATIENT
Start: 2021-04-28 | End: 2021-04-29

## 2021-04-28 RX ORDER — FUROSEMIDE 10 MG/ML
40 INJECTION INTRAMUSCULAR; INTRAVENOUS ONCE
Status: COMPLETED | OUTPATIENT
Start: 2021-04-28 | End: 2021-04-28

## 2021-04-28 RX ORDER — HYDROXYZINE HYDROCHLORIDE 25 MG/1
25-50 TABLET, FILM COATED ORAL 3 TIMES DAILY PRN
Status: CANCELLED | OUTPATIENT
Start: 2021-04-28

## 2021-04-28 RX ORDER — OXYCODONE HCL 5 MG/5 ML
2.5-5 SOLUTION, ORAL ORAL EVERY 4 HOURS PRN
Status: DISCONTINUED | OUTPATIENT
Start: 2021-04-28 | End: 2021-04-28

## 2021-04-28 RX ORDER — ONDANSETRON 2 MG/ML
4 INJECTION INTRAMUSCULAR; INTRAVENOUS EVERY 6 HOURS PRN
Status: DISCONTINUED | OUTPATIENT
Start: 2021-04-28 | End: 2021-05-15 | Stop reason: HOSPADM

## 2021-04-28 RX ORDER — DOXEPIN HYDROCHLORIDE 10 MG/ML
3-6 SOLUTION ORAL
Status: DISCONTINUED | OUTPATIENT
Start: 2021-04-28 | End: 2021-04-30

## 2021-04-28 RX ORDER — OXYCODONE HYDROCHLORIDE 5 MG/1
5 TABLET ORAL EVERY 4 HOURS PRN
Status: DISCONTINUED | OUTPATIENT
Start: 2021-04-28 | End: 2021-04-29

## 2021-04-28 RX ORDER — LANOLIN ALCOHOL/MO/W.PET/CERES
6 CREAM (GRAM) TOPICAL
Status: DISCONTINUED | OUTPATIENT
Start: 2021-04-28 | End: 2021-04-30

## 2021-04-28 RX ORDER — LORAZEPAM 0.5 MG/1
0.5 TABLET ORAL 3 TIMES DAILY PRN
Status: DISCONTINUED | OUTPATIENT
Start: 2021-04-28 | End: 2021-04-28

## 2021-04-28 RX ORDER — OXYCODONE HYDROCHLORIDE 5 MG/1
5 TABLET ORAL EVERY 4 HOURS PRN
Status: DISCONTINUED | OUTPATIENT
Start: 2021-04-28 | End: 2021-04-28

## 2021-04-28 RX ADMIN — BUSPIRONE HYDROCHLORIDE 30 MG: 15 TABLET ORAL at 19:37

## 2021-04-28 RX ADMIN — MESALAMINE 1000 MG: 1000 SUPPOSITORY RECTAL at 21:04

## 2021-04-28 RX ADMIN — INSULIN ASPART 1 UNITS: 100 INJECTION, SOLUTION INTRAVENOUS; SUBCUTANEOUS at 16:10

## 2021-04-28 RX ADMIN — INSULIN ASPART 1 UNITS: 100 INJECTION, SOLUTION INTRAVENOUS; SUBCUTANEOUS at 11:19

## 2021-04-28 RX ADMIN — LEVETIRACETAM 250 MG: 100 INJECTION, SOLUTION, CONCENTRATE INTRAVENOUS at 09:30

## 2021-04-28 RX ADMIN — Medication 1 MG: at 01:45

## 2021-04-28 RX ADMIN — FLUOXETINE 80 MG: 20 CAPSULE ORAL at 09:51

## 2021-04-28 RX ADMIN — LACTULOSE 3 G: 20 SOLUTION ORAL at 09:51

## 2021-04-28 RX ADMIN — LINACLOTIDE 290 MCG: 145 CAPSULE, GELATIN COATED ORAL at 09:50

## 2021-04-28 RX ADMIN — ACETAMINOPHEN 975 MG: 325 TABLET, FILM COATED ORAL at 09:49

## 2021-04-28 RX ADMIN — HUMAN ALBUMIN MICROSPHERES AND PERFLUTREN 5 ML: 10; .22 INJECTION, SOLUTION INTRAVENOUS at 11:46

## 2021-04-28 RX ADMIN — LEVETIRACETAM 250 MG: 100 INJECTION, SOLUTION, CONCENTRATE INTRAVENOUS at 21:02

## 2021-04-28 RX ADMIN — ACYCLOVIR: 50 OINTMENT TOPICAL at 11:19

## 2021-04-28 RX ADMIN — LORAZEPAM 0.5 MG: 2 INJECTION INTRAMUSCULAR; INTRAVENOUS at 07:52

## 2021-04-28 RX ADMIN — LACTULOSE 3 G: 20 SOLUTION ORAL at 19:37

## 2021-04-28 RX ADMIN — ACYCLOVIR: 50 OINTMENT TOPICAL at 14:59

## 2021-04-28 RX ADMIN — ACYCLOVIR: 50 OINTMENT TOPICAL at 21:01

## 2021-04-28 RX ADMIN — ACETAMINOPHEN 975 MG: 325 TABLET, FILM COATED ORAL at 00:32

## 2021-04-28 RX ADMIN — FUROSEMIDE 40 MG: 10 INJECTION, SOLUTION INTRAVENOUS at 09:29

## 2021-04-28 RX ADMIN — ACYCLOVIR: 50 OINTMENT TOPICAL at 18:23

## 2021-04-28 RX ADMIN — INSULIN ASPART 1 UNITS: 100 INJECTION, SOLUTION INTRAVENOUS; SUBCUTANEOUS at 08:37

## 2021-04-28 RX ADMIN — SODIUM PHOSPHATE, MONOBASIC, MONOHYDRATE 9 MMOL: 276; 142 INJECTION, SOLUTION INTRAVENOUS at 01:39

## 2021-04-28 RX ADMIN — BUSPIRONE HYDROCHLORIDE 30 MG: 15 TABLET ORAL at 09:50

## 2021-04-28 RX ADMIN — Medication: at 19:51

## 2021-04-28 RX ADMIN — BARIUM SULFATE 80 ML: 400 SUSPENSION ORAL at 14:35

## 2021-04-28 RX ADMIN — LORAZEPAM 0.5 MG: 2 INJECTION INTRAMUSCULAR; INTRAVENOUS at 22:03

## 2021-04-28 RX ADMIN — OXYCODONE HYDROCHLORIDE 5 MG: 5 TABLET ORAL at 16:58

## 2021-04-28 RX ADMIN — ACETAMINOPHEN 975 MG: 325 TABLET, FILM COATED ORAL at 16:22

## 2021-04-28 RX ADMIN — AMLODIPINE BESYLATE 10 MG: 10 TABLET ORAL at 09:50

## 2021-04-28 RX ADMIN — LORAZEPAM 0.5 MG: 2 INJECTION INTRAMUSCULAR; INTRAVENOUS at 14:54

## 2021-04-28 RX ADMIN — HYDROXYZINE HYDROCHLORIDE 50 MG: 25 TABLET, FILM COATED ORAL at 09:58

## 2021-04-28 RX ADMIN — ENOXAPARIN SODIUM 40 MG: 100 INJECTION SUBCUTANEOUS at 16:10

## 2021-04-28 RX ADMIN — PANTOPRAZOLE SODIUM 40 MG: 40 INJECTION, POWDER, FOR SOLUTION INTRAVENOUS at 18:24

## 2021-04-28 RX ADMIN — LORAZEPAM 0.5 MG: 2 INJECTION INTRAMUSCULAR; INTRAVENOUS at 22:43

## 2021-04-28 RX ADMIN — PREDNISONE 60 MG: 20 TABLET ORAL at 09:50

## 2021-04-28 RX ADMIN — SULFAMETHOXAZOLE AND TRIMETHOPRIM 320 MG: 80; 16 INJECTION, SOLUTION, CONCENTRATE INTRAVENOUS at 05:59

## 2021-04-28 RX ADMIN — I.V. FAT EMULSION 250 ML: 20 EMULSION INTRAVENOUS at 19:51

## 2021-04-28 ASSESSMENT — ACTIVITIES OF DAILY LIVING (ADL)
ADLS_ACUITY_SCORE: 23
ADLS_ACUITY_SCORE: 25
ADLS_ACUITY_SCORE: 23

## 2021-04-28 NOTE — PROGRESS NOTES
Hematology / Oncology  Daily Progress Note   Date of Service: 04/28/2021  Patient: Olga Bailey  MRN: [id number]  Admission Date: 4/26/2021  Hospital Day # 2  Cancer Diagnosis: Glioblastoma  Primary Outpatient Oncologist: Dr. Baker  Current Treatment Plan: S/p tumor resection 2/23/21. Plan is to undergo radiation and start Temozolomide, not yet initiated.     Assessment & Plan:   Olga Bailey is a 75 year old year old female with history of left frontoparietal glioblastoma s/p tumor resection 2/23/21. The plan was to undergo adjuvant chemoradiation, which has been held for prolonged admission at [facility] (3/26-4/26/21) with pneumonia and multiple DVTs, complicated by retroperitoneal hemorrhage and shock s/p IVC filter placement. She was transferred to [facility] on 4/26/21 for further Oncology and Radiation Oncology care.      # Glioblastoma Multiforme  Follows with Dr. Baker.   For further detail of oncologic history, please see below.  - Diagnosed with a left frontoparietal brain glioblastoma s/p resection 2/23/2021. Seen by Dr. Baker on 3/23, recommended temozolomide and radiation however treatment has not been initiated yet due to prolonged admission at [facility] with pneumonia, B/l LE DVTs, and RP hemorrhage.    - Transferred to [facility] where she could receive radiation treatment as appropriate.   - Radiation Oncology consulted   - Repeat brain MRI (4/26) shows postsurgical changes of left parietal craniotomy and mass resection. Peripheral T1 hyperintensity of the resection cavity with focal nodule of the superior resection cavity measuring 1.0 cm, which may represent disease recurrence. Stable 1.1 cm T1 hyperintense lesion along the right occipital convexity and in the anterior right lateral ventricle.     - If patient were to go to a facility, she would likely need to go to a facility that would provide transport to Radiation treatments  - If patient's performance status greatly improves,  we could consider starting temozolomide depending on hospital course, the recommended dose would be 75mg/m2 (140mg) daily in the evening until completion of radiation.   - Currently patient's performance status is too low to consider chemotherapy   - But If she discharges to a facility, will need to find one that accepts temozolomide. Appreciate SWS assistance. Of note, temozolomide is delivered from a [facility], we do not have this medication inpatient.    - Did discuss with Dr Baker that even if patient does not have a significant improvement in performance status, she may be able to proceed with palliative radiation with a shortened course    # Bilateral LE DVTs  # Retroperitoneal bleed  B/l LE DVTs noted on 3/29/21.   - Started therapeutic anticoagulation. C/b right retroperitoneal bleed 4/14/21 and subsequently developed acute hypoxic respiratory failure.   - IVC filter placed with IR on 4/16/21.   - Bilateral LE ultrasound showed improvement of previously diagnosed DVTs, though not resolution   - keep IVC filter   - anticoagulation per primary team, but ok to continue ppx lovenox    # Malnutrition  Regarding patient's nutrition status, currently on TPN  - Pending the results of the swallow study, we could consider a PEG tube if patient wants to continue to pursue treatment vs TPN   - This ultimately would warrant a GOC conversation as her goals of treatment would dictate whether we continue TPN or transition to PEG tube (this is also dependent on her ability to restore her own swallow function)  - If there are signs that she could continue to have restorative function to her swallowing, would continue TPN in the setting that she would be able to eat by mouth eventually     Recommendations:   - Bilateral LE ultrasound showed improvement of previously diagnosed DVTs   - keep IVC filter   - anticoagulation per primary team, but ok to continue ppx lovenox  - Agree with PE assessment per primary  team  - Hold temozolomide  - Patient will need to have and show an improvement in her performance status prior to initiation of chemo-radiation  - Did discuss with Dr Baker that even if patient does not have a significant improvement in performance status, she may be able to proceed with palliative radiation with a shortened course     We will continue to follow this patient. Please do not hesitate to page with any questions or concerns.     Patient was seen and plan of care was discussed with attending physician Dr. Fair.     Ruth Mccloud (Teri, PALENY  Hematology/Oncology   Pager: [phone]    Oncologic History:  - 2/2021 PRESENTATION: Progressive aphasia.   - 2/19/2021 MR brain imaging with 3.3 x 2.8 x 2.8 cm enhancing mass in left frontal-parietal region. A second contrast enhancing lesion (0.5 x 1.0 x 1.0 cm) in right occipital lobe which is dural based and largely stable in size since 9/2011; likely representing a meningioma.  - 2/23/2021 SURGERY: Gross total resection by Dr. Cummings  PATHOLOGY: Glioblastoma; IDH1-R132H wild-type/ IDH 1 and 2 wildtype, MGMT promoter methylated. Not BRAF mutated.   - 3/23/2021 Established care with Dr. Baker. Plan was to initiate chemotherapy with temozolomide 75mg/m2 (140mg) daily in the evening until completion of radiation. Temozolomide held for admission to [facility]. Adjuvant radiation was planned at [facility], held for dyspnea and subsequently admitted to [facility] with acute hypoxic respiratory failure and pneumonia.   __________________________________________________________________    Subjective & Interval History:    No acute events noted overnight.  Olga is more awake today. Had a rough night with anxiety.   Encourage to get up a chair today and utilize spirometer.   Appetite minimal. Energy low.  Denies fever, SOB, cough, abdominal pain, diarrhea, constipation, nausea, vomiting, dysuria, hematuria, numbness, tingling, swelling    Called and updated daughter,  [place] today    Complete and Comprehensive review of systems review and negative other than noted here or in the HPI.     Physical Exam:    Blood pressure (!) 148/79, pulse 98, temperature 97.4  F (36.3  C), temperature source Axillary, resp. rate 22, weight 73.8 kg (162 lb 11.2 oz), SpO2 94 %.    Constitutional: Pleasant female lying in bed. More awake today than yesterday.Conversational. Non- toxic appearing. No acute distress.   HEENT: Normocephalic, atraumatic. Sclerae anicteric. EOM intact.   Respiratory: Breathing comfortable with no increased work on room air. Good air exchange. No signs of respiratory distress or accessory muscle use.   Skin: Skin is clean, dry, intact. No jaundice appreciated.   Musculoskeletal/ Extremities: No redness, warmth, or swelling of the joints appreciated. Distal pulses palpable. Nailbeds pink and without cyanosis or clubbing.   Neurologic: Alert and oriented. Speech normal. Grossly nonfocal. Memory and thought process preserved.   Neuropsychiatric: Calm, affect congruent to situation. Appropriate mood and affect. Good judgment and insight. No visual/auditory hallucinations.       Labs & Studies: I personally reviewed the following studies:  ROUTINE LABS (Last four results):  CMP  Recent Labs   Lab 04/28/21  0535 04/27/21  1711 04/27/21  0548 04/26/21  1602 04/26/21  0615     --  132* 131* 130*   POTASSIUM 3.8 3.7 3.6 3.8 3.9   CHLORIDE 102  --  100 100 98   CO2 26  --  25 26 27   ANIONGAP 6  --  8 6 5   *  --  166* 71 129*   BUN 16  --  16 19 18   CR 0.40*  --  0.36* 0.42* 0.42*   GFRESTIMATED >90  --  >90 >90 >90   GFRESTBLACK >90  --  >90 >90 >90   ESTIVEN 8.7  --  8.5 8.8 8.1*   MAG 2.3 2.2 2.1 2.1 2.1   PHOS 3.2 2.3* 3.8 2.1* 1.7*   PROTTOTAL 5.4*  --  5.2* 5.5* 5.4*   ALBUMIN 2.5*  --  2.3* 2.4* 2.4*   BILITOTAL 0.3  --  0.4 0.4 0.4   ALKPHOS 116  --  126 123 119   AST 35  --  37 36 42   ALT 82*  --  92* 107* 106*     CBC  Recent Labs   Lab 04/28/21  0537  04/27/21  0548 04/26/21  1602 04/25/21  0625   WBC 13.7* 13.2* 15.8* 13.6*   RBC 3.20* 2.99* 3.23* 3.06*   HGB 9.3* 8.7* 9.2* 9.4*   HCT 29.1* 26.7* 28.5* 27.6*   MCV 91 89 88 90   MCH 29.1 29.1 28.5 30.7   MCHC 32.0 32.6 32.3 34.1   RDW 17.8* 17.2* 16.7* 16.7*    216 220 231     INR  Recent Labs   Lab 04/27/21  0548   INR 1.02       Medications list for reference:  Current Facility-Administered Medications   Medication     acetaminophen (TYLENOL) tablet 975 mg     acyclovir (ZOVIRAX) 5 % ointment     albuterol (PROAIR HFA/PROVENTIL HFA/VENTOLIN HFA) 108 (90 Base) MCG/ACT inhaler 2 puff     amLODIPine (NORVASC) tablet 10 mg     bisacodyl (DULCOLAX) Suppository 10 mg     busPIRone (BUSPAR) tablet 30 mg     carboxymethylcellulose PF (REFRESH PLUS) 0.5 % ophthalmic solution 1 drop     dextrose 10% infusion     glucose gel 15-30 g    Or     dextrose 50 % injection 25-50 mL    Or     glucagon injection 1 mg     doxepin (SINEquan) 10 MG/ML (HIGH CONC) solution 3-6 mg     enoxaparin ANTICOAGULANT (LOVENOX) injection 40 mg     FLUoxetine (PROzac) capsule 80 mg     hydrOXYzine (ATARAX) tablet 25-50 mg     insulin aspart (NovoLOG) injection (RAPID ACTING)     insulin aspart (NovoLOG) injection (RAPID ACTING)     lactulose (CHRONULAC) solution 3 g     levETIRAcetam (KEPPRA) 250 mg in sodium chloride 0.9 % 100 mL intermittent infusion     lidocaine (LMX4) cream     lidocaine 1 % 0.1-1 mL     linaclotide (LINZESS) capsule 290 mcg     lipids (INTRALIPID) 20 % infusion 250 mL     LORazepam (ATIVAN) injection 0.5 mg    Or     LORazepam (ATIVAN) tablet 0.5 mg     melatonin tablet 6 mg     mesalamine (CANASA) Suppository 1,000 mg     naloxone (NARCAN) injection 0.2 mg    Or     naloxone (NARCAN) injection 0.4 mg    Or     naloxone (NARCAN) injection 0.2 mg    Or     naloxone (NARCAN) injection 0.4 mg     ondansetron (ZOFRAN) injection 4 mg     oxyCODONE (ROXICODONE) solution 2.5-5 mg     pantoprazole (PROTONIX) 2 mg/mL  suspension 40 mg     parenteral nutrition - ADULT compounded formula     parenteral nutrition - ADULT compounded formula     polyethylene glycol (MIRALAX) Packet 17 g     [START ON 4/29/2021] predniSONE (DELTASONE) tablet 50 mg    Followed by     [START ON 5/1/2021] predniSONE (DELTASONE) tablet 40 mg    Followed by     [START ON 5/3/2021] predniSONE (DELTASONE) tablet 30 mg    Followed by     [START ON 5/4/2021] predniSONE (DELTASONE) tablet 20 mg    Followed by     [START ON 5/5/2021] predniSONE (DELTASONE) tablet 10 mg     prochlorperazine (COMPAZINE) injection 5 mg     sennosides (SENOKOT) tablet 8.6 mg     simethicone (MYLICON) suspension 40 mg     sodium chloride (PF) 0.9% PF flush 10 mL     sodium chloride (PF) 0.9% PF flush 10 mL     sodium chloride (PF) 0.9% PF flush 3 mL     sodium chloride (PF) 0.9% PF flush 3 mL     [START ON 4/29/2021] sulfamethoxazole-trimethoprim (BACTRIM) 320 mg in D5W 570 mL intermittent infusion

## 2021-04-28 NOTE — PROVIDER NOTIFICATION
Pt aspirated on 3-4mL of protonix suspension. Writer was able to suction majority out of mouth. Pt remains tachypenic (she has been this way all day) RR 22-24. Once finished coughing and suctioning, back on 4L oxymask with no obvious respiratory distress. Pt is C/O of SOB and anxiety. Writer called provider to have oxy switched to tablets instead of suspension to avoid further aspiration since pt swallows pills crushed in pudding well.     Provider switched orders and RN gave PRN oxy per provider approval.     Pt currently resting comfortably, VSS, remains on 4L oxymask.

## 2021-04-28 NOTE — PROGRESS NOTES
04/28/21 1400   General Information   Onset of Illness/Injury or Date of Surgery 04/26/21   Referring Physician Chris Cheek MD   Pertinent History of Current Problem Olga Bailey is a 75 y.o. 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   Past History of Dysphagia Patient was seen for SLP tx for dysphagia and for cognitive linguistic intervention February, March, April 2021. She was initially on Dysphagia diet 2/thin liquids, but did advance to regular and thin liquids. Pt was downgraded to nectar thick full liquids upon recent hospital admission per bedside assessment 4/18/21. The patient's daughter reports the patient was supposed to have a videoswallow study completed, but it did not happen due to respiratory decline and transfer to [facility].   Type of Evaluation   Type of Evaluation Swallow Evaluation   General Swallowing Observations   Current Diet/Method of Nutritional Intake (General Swallowing Observations, NIS) full liquid diet;nectar-thick liquids   Swallowing Evaluation Videofluoroscopic swallow study (VFSS)   VFSS Evaluation   Radiologist Radiology resident   Views Taken left lateral   Physical Location of Procedure Radiology suite 5   VFSS Textures Trialed Thin Liquids;Nectar-Thick Liquids;Purees;Solid   VFSS Eval: Thin Liquid Texture Trial   Mode of Presentation, Thin Liquid spoon   Order of Presentation 3, 4   Preparatory Phase WFL   Oral Phase, Thin Liquid WFL   Pharyngeal Phase, Thin Liquid Delayed swallow reflex   Rosenbek's Penetration Aspiration Scale: Thin Liquid Trial Results 7 - contrast passes glottis, visible subglottic residue remains despite patient's response  (aspiration)   Response to Aspiration unproductive reflexive involuntary cough/throat clear   Diagnostic Statement Pt presents with aspiration with thin liquid on 2/2 trials, pt demonstrates adequate pharyngeal sensation characterized by immediate, spontaneous, cough response one 2/2 aspiration events.   VFSS Evaluation: Nectar Thick Liquid Texture Trial   Mode of Presentation, Nectar spoon;cup;self-fed   Order of Presentation 1, 2, 7, 8   Preparatory Phase WFL   Oral Phase, Chaplin WFL   Rosenbek's Penetration Aspiration Scale: Nectar-Thick Liquid Trial Results 1 - no aspiration, contrast does not enter airway  (however note pt did have x1 instance of deep pen, 5 on scale)   Response to Aspiration, Nectar unproductive reflexive involuntary cough/throat clear   Diagnostic Statement Pt presents with largely functional oral and pharyngeal phase however note deep penetration on 1/6 trials. No penetration or aspiration when pt taking large consecutive sips via cup   VFSS Evaluation: Puree Solid Texture Trial   Mode of Presentation, Puree spoon   Order of Presentation 5   Preparatory Phase WFL   Oral Phase, Puree WFL   Pharyngeal Phase, Puree Residue in valleculae   Rosenbek's Penetration Aspiration Scale: Puree Food Trial Results 1 - no aspiration, contrast does not enter airway   Diagnostic Statement No aspiration observed, small-medium amount of residual in pharyngeal cavity following swallow   VFSS Evaluation: Solid Food Texture Trial   Mode of Presentation, Solid fed by clinician   Order of Presentation 6   Preparatory Phase WFL   Oral Phase, Solid WFL   Pharyngeal Phase, Solid WFL   Rosenbek's Penetration Aspiration Scale: Solid Food Trial Results 1 - no aspiration, contrast does not enter airway   Diagnostic Statement No observed penetration or aspiration    Esophageal Phase of Swallow   Patient reports or presents with symptoms of esophageal dysphagia No   Swallowing Recommendations   Diet Consistency  Recommendations nectar-thick liquids;full liquid diet   Supervision Level for Intake close supervision needed   Mode of Delivery Recommendations bolus size, small;slow rate of intake   Monitoring/Assistance Required (Eating/Swallowing) stop eating activities when fatigue is present;monitor for cough or change in vocal quality with intake   Recommended Feeding/Eating Techniques (Swallow Eval) maintain upright sitting position for eating;maintain upright posture during/after eating for 30 minutes   SLP Therapy Assessment/Plan   Criteria for Skilled Therapeutic Interventions Met (SLP Eval) yes;treatment indicated   SLP Diagnosis mild-moderate oropharyngeal dysphagia    Rehab Potential (SLP Eval) good, to achieve stated therapy goals   Therapy Frequency (SLP Eval) 5 times/wk   Predicted Duration of Therapy Intervention (SLP Eval) 2 weeks   Comment, Therapy Assessment/Plan (SLP) SLP: Pt seen for video swallow study per MD orders. Pt is currently on full, nectar thick liquid diet. Pt presents with mild-moderate pharyngeal dysphagia characterized by delay in swallow initiation and incomplete epiglottic closure (pt presents with complete epiglottic inversion, however inadequate closure) leading to aspiration with thin liquid on 2/2 trails.  Pt does demonstrate adequate pharyngeal sensation and characterized by spontaneous and reliable cough response with aspiration events. Note that pt also trialed nectar thick liquid and was observed to have x1 instance of deep penetration, however on follow up trials of large consecutive sips of nectar thick liquid no penetration or aspiration was observed. No penetration or aspiration noted with puree or solid trials however pt did have small-moderate amount of pharyngeal stasis following puree trials which she was unable to sense independently. Recommend pt continue of full, nectar thick liquid diet at this time. Speech therapy will continue to follow to ensure diet tolerance and trials  of advanced solids as pt is able and appropriate at bedside given pt has adequate pharyngeal sensation with aspiration at bedside.    Therapy Plan Review/Discharge Plan (SLP)   Therapy Plan Review (SLP) evaluation/treatment results reviewed;care plan/treatment goals reviewed;risks/benefits reviewed;current/potential barriers reviewed;participants included;patient   SLP Discharge Planning    SLP Discharge Recommendation (DC Rec) Transitional Care Facility   SLP Rationale for DC Rec benefits from SLP tx to improve safe swallowing    SLP Brief overview of current status  Recommend the patient continue nectar thick full liquids. Pt currentlyl at high aspiration risk with even small amount of thin liquids via spoon. Pt should be fully awake, alert, and sitting upright with all PO. Speech therapy will continue to follow to ensure diet tolerance and recommend diet advancement as pt is able and appropriate.    Total Evaluation Time   Total Evaluation Time (Minutes) 15

## 2021-04-28 NOTE — LETTER
4/28/2021      RE: Olga Bailey  [address] [place] [address]       Simulation completed.  Ms. Bailey tolerated lying supine with mask on.    She will be evaluated by Dr. Spann tomorrow and a radiation plan will be finalized at that time.    MD Ramírez Trejo MD

## 2021-04-28 NOTE — LETTER
4/28/2021         RE: Olga Bailey  [address] [address]        Dear Colleague,    Thank you for referring your patient, Olga Bailey, to the [facility] RADIATION [facility]COL[facility]. Please see a copy of my visit note below.    Simulation completed.  Ms. Bailey tolerated lying supine with mask on.    She will be evaluated by Dr. Spann tomorrow and a radiation plan will be finalized at that time.    Ramírez Hlot MD      Again, thank you for allowing me to participate in the care of your patient.        Sincerely,        Ramírez Holt MD

## 2021-04-28 NOTE — PLAN OF CARE
Care Provided: [id number]    Temp: 97.4  F (36.3  C) Temp src: Axillary BP: 148/79 Pulse: 98   Resp: 22 SpO2: 94 % O2 Device: Nasal cannula Oxygen Delivery: 4 LPM    Neuro: A&Ox4. Endorses anxiety. Able to make needs known. Pt requested ativan in afternoon after swallow study, 0.5mg IV given with new linked order; pt appeared more comfortable after administration.   Cardiac: SR 90s when comfortable, ST 110s with episodes of anxiety. BP stable. Denies chest pain.   Respiratory: RR 20-26, see note on RRT called in AM for anxiety attack with RR in the 40s ~1hr, but recovered with time on BiPAP. Pt on NC 4L throughout afternoon. Endorses SOB at rest and JACOBSEN. Lungs diminished in the bases.   GI: Large, loose BM on shift. Poor appetite with minimal PO intake on shift despite encouragement- ate 1 cup vanilla pudding, 100% cream of wheat cereal with brown sugar, and 50% of a chocolate pudding. No interest in fluids; nectar level thickened.   : Adequate urine output; purewick intact. ~1400cc out within 4h of 40mg IV lasix. Clear/carlin.   Activity: Lift assist. Repositioned frequently for pressure relief.   Pain: Denies pain, no PRNs given on shift.   Skin: Bruising noted abdomen & arms, trace +1 BLE edema, preventative mepilix on coccyx. Intact, no new deficits noted. Pt on pulsate mattress.    Lab:  Lactic 3.0 during RRT, recheck at 1300 resulted 2.1    LDAs:    - PICC: TPN @ 35cc/hr, TKO  - Purewick    Shift Events: RRT in AM for RR in 40s with anxiety, 40mg IV lasix given after chest xray, ECHO completed, simulation run in radiation oncology, swallow study in xray (continue nectar thickened liquids).     Plan: Will continue to monitor pt closely and notify primary team with any changes. Encourage BiPAP use at night and PRN.       Problem: Adult Inpatient Plan of Care  Goal: Plan of Care Review  Outcome: No Change     Problem: Fluid Imbalance (Pneumonia)  Goal: Fluid Balance  Outcome: No Change    Problem: Respiratory  Compromise (Pneumonia)  Goal: Effective Oxygenation and Ventilation  Outcome: No Change

## 2021-04-28 NOTE — TELEPHONE ENCOUNTER
"La Nena called clinic to request update and further recommendations from RNCC/Dr. Baker.     La Nena reported her concerns to patient relations re: high dose steroid use and PJP Pneumonia. She received a call back today from [facility] Patient Relations team reporting, \"They have looked into this and decided the ARU/neurosurgery clinic is not at fault.\"     La Nena is tearful and reporting frustration and anger as she feels this was due to Olga's continued steriod use and lack of tapering steroids in a timely matter. La Nena is wondering if care team has any additional information on this matter, and is looking for direction on where to go next. Also wants update if the inpatient/outpatient patient relations team is still working on this matter or if the \"case is closed.\"     La Nena also has questions regarding patient's care at [facility] (recently transferred from [facility]). Writer deferred questions related to inpatient care to inpatient nursing team while patient is admitted. La Nena reports understanding.     Writer discussed La Nena's concerns with Patient Care Supervisor Castillo Jimenes who will follow-up with for additional recommendations.     Louise Jerry, BSN, RN, PHN, OCN  Oncology Care Coordinator  [facility] [place]      "

## 2021-04-28 NOTE — PLAN OF CARE
Neuro: Alert, or easily aroused while sleeping. Able to sleep between cares. Oriented except to time. Able to use call light and ask for help.   Cardiac: SR. BP elevated   Respiratory: Sating adequately on 4 LPM NC  GI/: Adequate urine output via Purewick. No BM  Diet/appetite: Tolerating diet. TPN lipids  Activity:  Turn Q 2 hours. Bedrest  Pain: At acceptable level on current regimen.   Skin: No new deficits noted.  LDA's:  PICC  Purewick.   Plan: Continue with POC. Notify primary team with changes.

## 2021-04-28 NOTE — PROGRESS NOTES
"Time of notification: 0740 - M3; 0745- RRT 6  Provider notified: M3-Dittes; RRT- Elsa    Patient status: RR in 40's noted to start during RN report. Pt reported anxiety at that time, so RN reassured to return with PRN 50mg atarax to give with morning meds. Pt was yelling out shortly after \"help me, help me\" so RNs from nursing station responded, switched her from 4L NC to 8L oxymask for sats mid 80s. RRT called at 0745 as patient RR still in the 40s and pt stating, \"I am suffocating\" and O2 increased to 10L oxymask for sats in the mid 80s on 8L.      Orders received: RRT response ordered IV ativan 0.5mg in place of PO meds that pt would need to take crushed in pudding. Pt placed on BiPAP 50%. Lactic acid, blood cultures, ABGs, EKG, and stat chest xray ordered. Continue to monitor closely.         "

## 2021-04-28 NOTE — PLAN OF CARE
6B PT: Cancel, per RN not appropriate for PT intervention this AM and unavailable upon return in PM. Will reschedule per PT POC.

## 2021-04-28 NOTE — PROGRESS NOTES
[facility] ID Service: Follow Up Note      Patient:  Olga Bailey, Date of birth 1945, Medical record number [id number]  Date of Visit:  04/28/2021          Assessment and Recommendations:   RECOMMENDATION:  1. Further reduce Bactrim to 400-80mg/day (one single tablet equivalent) PO (or IV) for PJP secondary prophylaxis. Continue for 1.5 months following eventual discontinuation of steroids.  2. No indication to continue steroids for any PJP indication since her treatment course has been completed.  Please taper the steroids as rapidly as is feaisble per Pulmonology Consult input (given concern for TACO/TRALI) and the need to avoid adrenal insufficiency (given that she has been on steroid therapy for two+ months).  3. There does not seem to be any indication at present for additional infection-related work-up (such as a bronchoscopy). New intermittent lactic acidosis thought to be 2/2 hypoxic events with anxiety attacks.     ASSESSMENT:  1. PJP pneumonia, with extended course of high dose steroids, resolved.    2. Intermittent lactic acidosis, thought to be 2/2 transient hypoxic events with anxiety     DISCUSSION:   Olga Bailey is a 75 year-old woman with a history of asthma, HTN, MARCELLE, GERD, depression, and recent hospitalization 2/17-3/1/21 for glioblastoma multiforme s/p resection 2/23 who presents to [facility] as a transfer from [facility] following admission for PJP pneumonia. Patient has been admitted for consideration of inpatient chemotherapy and radiation for her glioblastoma.     # Intermittent Lactic Acidosis   # Concern for pulmonary infection  Patient is s/p multiple antibiotic courses, and has been afebrile. Supplement O2 needs have been decreasing overall, currently on 4L NC. WBC increased slightly above normal limits, but patient has been on prolonged course of steroids. CRP downtrending at 13 4/27, now at 10.     Patient had rapid response called yesterday evening for increased  respiratory rate, hypoxia, and a lactic acidosis of 2.9. Hypoxia 2/2 panic attack vs worsening ARDS vs new infectious process. Patient was afebrile with stable WBC. CXR with mild improvement of opacities. She was reported as being very anxious at the time. Patient received a dose of lorazepam and had subsequent resolution of tachypnea. Repeat lactic acid 1.8 two hours later. Given the quick resolution of lactic acidosis with stable vitals and WBC, lactic acidosis most-likely the result of an hypoxic event secondary to a panic attack, likely exacerbated by the underlying inflammatory state of her lungs. Given the above, there is a low likelihood patient currently has new infection. No further infectious workup or bronchoscopy recommended at this time.     # PJP Pneumonia  Patient completed a 21-day course of Bactrim with concurrent steroids for PJP treatment 3/26-4/15, and has continued on Bactrim since. Given complete course with improving symptoms prior to retroperitoneal bleed, we believe PJP pneumonia to be treated at this time. Patient should continue Bactrim at lower dose (400-80mg/day) while on high-dose steroids, and for 1.5 months following last day of steroids.     Thank you for this consult. ID will continue to follow.      Patient was discussed with Dr. Mckay.      Nico Nielson MS3    General ([place]) ID Consult Attending Attestation:     Ms. Bailey was seen and evaluated by me, Bean Mckay MD. The case was discussed at length with the ID Fellow, Dr. Mehta, and the Medical Student ANA MARÍA huang, MS3. The history and examination were co-obtained by myself in conjunction with our ID team and I agree with their interval history and examination, assessment and recommendations in this General ID Progress Note for today. This note reflects our joint decision-making and notation. I have reviewed today's vital signs, medications, labs and imaging and the history and examination, review of systems,  assessment and recommendations reflect my observations and opinions.     Bean Mckay MD  Pager [phone]        Interval History:   - Rapid response team called the evening of 4/27 for tachypnea with lactic acid of 2.9   - Afebrile, CXR with mild improvement of opacities   - Lactic acid improved to 1.9 in 2 hourse   - Patient reported being anxious at the time  - Talking to the patient this morning, she feels that anxiety is her main acute concern. She reports that the anxiety attacks come out of nowhere, and can't think of a particular trigger.  - No fevers, but will intermittently feel cold. This improves after she gets more blankets.  - No reported nausea, abdominal or chest pain  - Patient still doesn't have much of an appetite, but is planning to try to eat this morning             Review of Systems:   Full 9 pt ROS obtained, pertinent positives and negatives as above.          Current Antimicrobials   Current:  TMP-SMX (3/29 - )    Prior:  Ceftriaxone (3/27 - 3/29/21)  Doxycycline (3/27 - 4/1/21)  Zosyn (3/29 - 4/4/21)         Physical Exam:   Ranges for vital signs:  Temp:  [97.4  F (36.3  C)-98.6  F (37  C)] 97.4  F (36.3  C)  Pulse:  [] 105  Resp:  [18-40] 32  BP: (138-165)/(65-91) 148/79  FiO2 (%):  [35 %-50 %] 35 %  SpO2:  [91 %-99 %] 95 %    Intake/Output Summary (Last 24 hours) at 4/28/2021 0946  Last data filed at 4/28/2021 0930  Gross per 24 hour   Intake 2268.53 ml   Output 2100 ml   Net 168.53 ml     Exam:  GENERAL:  well-developed, well-nourished, lying in bed with BiPAP donned. Initially appeared anxious with gradual improvement. No acute distress.   ENT:  Head is normocephalic, atraumatic. Oropharynx is moist without exudates or ulcers.  EYES:  Eyes have anicteric sclerae.    NECK:  Supple.  LUNGS:  Diffuse crackles bilaterally. Appeared tachypnic when RN removed BiPAP, but slowly normalized.  CARDIOVASCULAR:  Tachycardic with regular rhythm. No murmurs, gallops or rubs.  ABDOMEN:   Normal bowel sounds, soft, nontender.  EXT: Extremities warm and without edema.  SKIN:  No acute rashes.  Line is in place without any surrounding erythema.  NEUROLOGIC:  Some difficulty word finding.         Laboratory Data:     Recent Labs   Lab 04/28/21  0535 04/28/21  0026 04/27/21  1711 04/27/21  0548 04/26/21  1602   WBC 13.7*  --   --  13.2* 15.8*   HGB 9.3*  --   --  8.7* 9.2*   MCV 91  --   --  89 88     --   --  216 220   INR  --   --   --  1.02  --      --   --  132* 131*   POTASSIUM 3.8  --  3.7 3.6 3.8   CHLORIDE 102  --   --  100 100   CO2 26  --   --  25 26   BUN 16  --   --  16 19   CR 0.40*  --   --  0.36* 0.42*   ANIONGAP 6  --   --  8 6   ESTIVEN 8.7  --   --  8.5 8.8   *  --   --  166* 71   ALBUMIN 2.5*  --   --  2.3* 2.4*   PROTTOTAL 5.4*  --   --  5.2* 5.5*   BILITOTAL 0.3  --   --  0.4 0.4   ALKPHOS 116  --   --  126 123   ALT 82*  --   --  92* 107*   AST 35  --   --  37 36   TROPI 0.041 0.048* 0.034  --   --         4/27/2021 17:11 4/27/2021 19:35 4/28/2021 08:31   Lactic Acid  1.8 3.0 (H)   Lactate for Sepsis Protocol 2.9 (H)         Culture data:  Blood cultures 4/26: NGTD  Blood cultures 4/28: NGTD         Imaging:   CXR 4/28/21  Findings:   Right arm PICC tip in the high right atrium. Stable cardiomegaly.  Stable mixed opacities throughout both lungs. Stable elevation of the  right hemidiaphragm. No significant pleural effusions.     CXR 4/27/21  IMPRESSION:   Diffuse bilateral mixed interstitial and airspace opacities, slightly  improved in the right lung.    MR Brain w/ contrast 4/26/21  Impression: Postsurgical changes of left parietal craniotomy and mass  resection. There is peripheral T1 hyperintensity of the resection  cavity with focal nodule of the superior resection cavity measuring  1.0 cm, which may represent disease recurrence. Stable 1.1 cm T1  hyperintense lesion along the right occipital convexity and in the  anterior right lateral ventricle. Limited imaging  primarily for the  purposes of stereotactic localization.     CXR 4/26/21  Impression:   Diffuse opacities are now primarily interstitial with less alveolar  opacities, could represent proving impulmonary edema vs sequela from  ARDS.

## 2021-04-28 NOTE — PROGRESS NOTES
[facility]    Progress Note - [place] 3 Service        Date of Admission:  4/26/2021    Assessment & Plan   Olga Bailey is a 75 y.o. F w/ GBM s/p resection (Feb 2021), depression/anxiety, HTN, asthma, and GERD, who was transferred to [facility] for consideration of inpt rad/onc treatment while she continues to be treated for complex presentation of acute hypoxic respiratory failure (due to possible PJP pneumonia and/or TRALI), 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     Acute hypoxic respiratory failure   Bilateral Pneumonia; presumed PJP  Concern for transfusion-related acute lung injury (TRALI)  Concern for R hemidiaphragm paralysis  Dyspneic/hypoxic in late March for admission at OSH. Initially treated with ceftri/doxy. ID switched to course of Zosyn/doxy with Bactrim treatment dosing for PJP (had been on steroids after admission for glioblastoma resection; Fungitell positive but Galactomannan negative). Unable to get adequate sputum samples for diagnosis of PJP. Improved with the Bactrim until several days after RP bleed (see below). W/u for PE, COVID, and respiratory viral panel was negative. Procal low. Bilateral lung opacities present. Concern for ongoing PJP infection (Bactrim was re-started), inflammatory lung condition (increased steroids), volume overload (diuresed for several days with IV Lasix). Imaging shows R hemidiaphragm elevation (concerning for paralysis?), and opacities appear to be improving as are oxygen requirements (weaned from high-flow to Oxymask 4-7L now). Pulmonology at OSH was considering bronch if no improvement, though pulm and ID here find no benefit to pursuing this now with her improvement. Had RR called overnight and this am (see interval history) for tachypnea. Quickly improved with PRN anxiety medication, BiPAP and diuresis (appeared volume up). VBGs normal (although had been on BiPAP ~15 minutes),  EKG, CXR, not concerning.  -Discussed with Pulmonology    -Given DVTs, some concern for PE, but held off on CT PE; now improved   -Her right long appears better on imaging, but also has right-sided hemidiaphragm elevation.  Should attempt to maintain patient in a more upright position to improve ventilation of right lung.   - Pulm will follow peripherally; recommend checking CRP, ESR, procal and  contacting if patient develops fever, worsening chills  - Work on better control anxiety as below   -  40 mg IV lasix given w good UOP; will consider additional doses based on exam  - TTE without evidence of RV strain, could not assess IVC  - pulm, ID consults   - wean steroids: from methylpred IV 62.5 mg BID -> prednisone 60 mg x 2 d (starting 4/28) -> pred 40 mg x 2 d -> pred 30 mg x 1 d -> pred 20 mg x 1 d -> pred 10 mg x 1 d   - both agree on holding off on bronch (this is also family/pt's preference)  - Changing Bactrim to prophylactic dosing per ID recs;  switching to single strength Bactrim qday x 1.5 mo after steroids end  - Palliative care consulted for assistance in symptom management; appreciate recs    - added dyspnea to indication for oxycodone   - Consider re-starting diuresis and/or repeating CT chest if not improving with the above   - BNP normal  - PJP stain, PCR; conventional sputum culture ordered if patient can produce sputum   - f/u blood cultures  - wean O2 as able to keep sats > 92%  - RT consult for BIPAP   - PT, OT consults (pt's family requests this happen twice daily if possible)  - SLP consult; plan for video swallow study on 4/27; currently on dysphagia diet      Glioblastoma Multiforme  Left frontoparietal brain glioblastoma status post resection 2/23/2021.  Seen by radiation oncology 3/24 recommended XRT and chemo radiation. However subsequently admitted for resp failure. MRI brain here signs of possible disease recurrence. Hem/onc concerned that she might not be a candidate for chemotherapy  or radiation currently due to poor performance status.  -Rad/onc following; appreciate recs  -Heme/onc consulted, appreciate recs   - Patient not strong enough for chemo, but considering initiation of short course of radiation while inpatient   - would consider temozolomide at late date pending clinical course (needs to improve performance status)    Bilateral lower extremity DVT  Right retroperitoneal hematoma   On 3/29 at outside hospital patient's O2 needs increased, duplex ultrasound revealed bilateral lower extremity DVT. CT PE negative for embolism.  Treated with IV heparin and transitioned to Lovenox 70 mg.  On 4/14 this was complicated by retroperitoneal bleed, requiring 4 units of packed red blood cells.  Lovenox was held, and IVC filter was placed on 4/16.  Vascular surgery was involved, and a CT abdomen was stable bleed so no surgical intervention was required.  Eventually resumed on prophylactic 40mg of Lovenox twice daily.  -Continue 40 mg Lovenox qday  -hem/onc consulted; appreciate recs    - bilateral duplex US show improved bilateraly clot, but still present.    - Defer to heme on anticoagulation   - pain plan:              - Tylenol 975 mg q8h brianna              - oxycodone 5 mg PO q4h PRN --> decreasing to 2.5 mg q4h PO PRN to avoid delirium               - discontinued Dilaudid to avoid delirium     Major depressive disorder, recurrent, in partial remission  Follows with psychiatry and psychology as outpatint.  Saw health psychology during admission for GBM, made plan for outpatient follow-up and med adjustment,  but was unable.  Given prolonged admission patient could benefit from inpatient consultation again and patient requesting this.  - Health psychology consult; appreciate recs   - Palliative recs appreciated;   - PRN ativan increased to TID and made first line (consider scheduling if having  attacks)   -Continue hydroxyzine 2nd line   - Added doxepin PRN at night for sleep  - Consulted  Psychiatry for recs on baseline anxiety meds; discussed with Dr. Son  - c/t PTA Buspar, Prozac    Ileus  Began after bleed at outside hospital 4/14; was on TPN for nutrition.  GI was consulted for assistance with bowel management.  With escalating bowel regimen finally had small BM 4/25 after tap water enema, had some liquid stool 4/27. AXR shows non-obstructive bowel gas pattern.  -Continue prior Bowel regimen: lactulose, linaclotide, mesalamine, MiraLAX, simethicone as needed)      Non-severe malnutrition on TPN  Concern for refeeding syndrome  Hypophosphatemia   Gave 30 mmol phos on admission for level of 2.1, increased to 3.1 4/27.   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 Urine sodium 50 while her sodium was 128, certainly consistent with this. Neurologic etiology with her GBM resection likely. Consistently 130-131 recent days.   - trend Na; consider fluid restriction if any concern for worsening Na     Shock Liver, improving  LFTs sharply up after RP bleed at OSH, [place] due to shock liver. LFT's have steadily trended down since. Only AST remains elevated 107 4/26.   -Recheck CMP in am    Hyperglycemia   Steroid induced. Intermittently needed insulin at OSH.   -Low sliding scale insulin  -Gluc checks     Leukocytosis  Ddx is infection vs steroid-induced.  - trend CBC     Hypertension  Sinus tachycardia  Started on metoprolol at OSH for sinus tachycardia.  - c/t PTA amlodipine 10 mg qday  - hold BB    Chronic medical problems:  - albuterol PRN  - BIPAP at bedtime (on CPAP at home) and for comfort  - GERD: back to PTA PO PPI     Diet: Full Liquid Diet Nectar Thickened Liquids (pre-thickened or use instant food thickener)  Snacks/Supplements Adult: Ensure Enlive; Between Meals  parenteral nutrition - ADULT compounded formula  parenteral nutrition - ADULT compounded formula    Fluids:  Oral; received significant fluid with bacterim   Lines: PICC line  DVT Prophylaxis: Enoxaparin (Lovenox) SQ  Martino Catheter: not present  Code Status: Full Code         Disposition Plan   Expected discharge: > 7 days, recommended to transitional care unit once respiratory status improves, plan for rehab and chemo/radiation in place .  Entered: Bentley Yo 04/28/2021, 2:54 PM     The patient's care was discussed with the Attending Physician, Dr. Tomlin.    Bentley Ram  Medical Student  [address]  Please see sign in/sign out for up to date coverage information    Resident/Fellow Attestation   I, Melina Sanabria, was present with the medical/RICKY student who participated in the service and in the documentation of the note.  I have verified the history and personally performed the physical exam and medical decision making.  I agree with the assessment and plan of care as documented in the note.      RRT this AM with tachypnea, hypoxia, and tachycardia - improved with ativan, bipap, and IV lasix x1. Difficult to tell if symptoms were 2/2 panic attack vs acute worsening of hypoxic respiratory failure. Has been wearing bipap for comfort prior to transfer but was not last night which may have contributed. Per pulm and ID okay to transition to ppx bactrim after full treatment course x3 weeks. Palliative assisting with further anxiety management and psychology and psychiatry consulted - will follow-up recs. Radiation oncology working on starting radiation therapy this admission. Remainder per medical student note.    Melina Sanabria MD  PGY1  Date of Service (when I saw the patient): 04/28/21    ______________________________________________________________________    Interval History    Rapid response called in the evening for tachypnea (RR in the 40's) and tachycardia (~110).  Lactate drawn elevated at 2.9.  Given 250 mL bolus, repeat lactate 1.8.  EKG  "unchanged.  ABGs showed pH 7.46 slightly up from 7.42 earlier in the day, PCO2 of 37 slightly down from 43 on room air.  Chest x-ray obtained showed improvement in right lung.  Troponin obtained and slightly elevated at 0.048. Patient improved symptomatically on BiPAP and IV ativan, hydroxizine. Was eventually stable on 4 L on per NC the rest of the night. Troponin in the am normalized at 0.041.     RR called again in the am for similar complaints. RR was in the 40's She did de-sat to ~86% on RA, was placed on BIPAP. Also complained of anxiety and was given PRN IV ativan. RRT ordered lactate, CXR, EKG.       Patient seen after BiPAP was placed; had difficult responding to questions, but she reported both feeling increasingly SOB and anxious this am. Could not tell that either symptoms was more predominant. Chest felt \"tight.\" but no pain. Abdominal pain stable.      4 Point ROS obtained and negative except as noted above.     Data reviewed today: I reviewed all medications, new labs and imaging results over the last 24 hours. I personally reviewed   Physical Exam   Vital Signs: Temp: 97.4  F (36.3  C) Temp src: Axillary BP: (!) 148/79 Pulse: 98   Resp: 22 SpO2: 94 % O2 Device: Nasal cannula Oxygen Delivery: 4 LPM  Weight: 162 lbs 11.19 oz   General Appearance: lying propped up in bed, mild-moderate increased work of breathing on bipap, appears to be in mild distress   Eyes: no scleral icterus, EOMI  HEENT: neck supple, normocephalic, BIPAP mask in place  Respiratory: increased WOB, auscultation difficult considering body positioning, but seems to have L > R bilateral crackles in anterior fields, coarse breath sounds in apices bilaterally   Cardiovascular: Cannot auscultated clearing with mechanical BiPAP sounds. Has JVD. Increase non-pitting edema in bilateral feet.   GI: mild distension, soft, mild non-focal tender over all quadrants, bowel sounds present  Skin: scattered bruises over upper exposed upper " extremities   Musculoskeletal: no pedal edema  Neurologic: moves all extremities spontaneously, follows commands.   Psychiatric: Mild distress      Data   Recent Labs   Lab 04/28/21  0831 04/28/21  0535 04/28/21  0026 04/27/21  1711 04/27/21  0548 04/27/21  0548 04/26/21  1602   WBC  --  13.7*  --   --   --  13.2* 15.8*   HGB  --  9.3*  --   --   --  8.7* 9.2*   MCV  --  91  --   --   --  89 88   PLT  --  219  --   --   --  216 220   INR  --   --   --   --   --  1.02  --    NA  --  134  --   --   --  132* 131*   POTASSIUM  --  3.8  --  3.7  --  3.6 3.8   CHLORIDE  --  102  --   --   --  100 100   CO2  --  26  --   --   --  25 26   BUN  --  16  --   --   --  16 19   CR  --  0.40*  --   --   --  0.36* 0.42*   ANIONGAP  --  6  --   --   --  8 6   ESTIVEN  --  8.7  --   --   --  8.5 8.8   GLC  --  155*  --   --   --  166* 71   ALBUMIN  --  2.5*  --   --   --  2.3* 2.4*   PROTTOTAL  --  5.4*  --   --   --  5.2* 5.5*   BILITOTAL  --  0.3  --   --   --  0.4 0.4   ALKPHOS  --  116  --   --   --  126 123   ALT  --  82*  --   --   --  92* 107*   AST  --  35  --   --   --  37 36   TROPI 0.044 0.041 0.048* 0.034   < >  --   --     < > = values in this interval not displayed.     Recent Results (from the past 24 hour(s))   US Lower Extremity Venous Duplex Bilateral    Narrative    EXAMINATION: DOPPLER VENOUS ULTRASOUND OF BILATERAL LOWER EXTREMITIES,  4/27/2021 5:13 PM     COMPARISON: Bilateral lower extremity venous ultrasound 3/29/2021    HISTORY: Prior bilateral LE DVTs, assessing for resolution/stability  for potential IVC filter removal    TECHNIQUE:  Gray-scale evaluation with compression, spectral flow and  color Doppler assessment of the deep venous system of both legs from  groin to knee, and then at the ankles.    FINDINGS:  In both lower extremities, the common femoral, femoral, popliteal and  posterior tibial veins demonstrate normal compressibility and blood  flow. No compressibility of the bilateral soleal veins.  Resolution of  previously seen thromboses of the bilateral posterior tibial veins.    Hypoechoic fluid collection of the left popliteal fossa measuring 3.0  x 4.5 x 1.5 cm      Impression    IMPRESSION:  1.  Improving bilateral DVTs with resolution of previously seen  posterior tibial vein thromboses. Persistent occlusive thrombus of the  bilateral soleal veins.  2.  4.5 cm fluid collection of the left popliteal fossa, likely  Baker's cyst.    I have personally reviewed the examination and initial interpretation  and I agree with the findings.    EDGAR BROWN MD   XR Chest Port 1 View    Narrative    EXAM: XR CHEST PORT 1 VIEW  4/27/2021 6:01 PM      HISTORY: tachypnea    COMPARISON: Chest x-ray 4/26/2021    FINDINGS: Portable semiupright AP radiograph of the chest. Right upper  extremity PICC tip projects over the low SVC. The trachea is midline.  The cardiac silhouette is stable. No pleural effusion or pneumothorax.  Bilateral diffuse mixed interstitial and airspace opacities, slightly  improved in the right lung. The visualized upper abdomen is  unremarkable. Surgical clips projecting over the right neck.      Impression    IMPRESSION:   Diffuse bilateral mixed interstitial and airspace opacities, slightly  improved in the right lung.    I have personally reviewed the examination and initial interpretation  and I agree with the findings.    EDGAR BROWN MD   XR Chest Port 1 View    Narrative    Exam: XR CHEST PORT 1 VIEW, 4/28/2021 8:20 AM    Indication: hypoxia    Comparison: 4/27/2021    Findings:   Right arm PICC tip in the high right atrium. Stable cardiomegaly.  Stable mixed opacities throughout both lungs. Stable elevation of the  right hemidiaphragm. No significant pleural effusions.      Impression    Impression: Stable bilateral mixed opacities throughout both lungs.  Stable cardiomegaly.    MARILIN BERG MD   Echo Limited    Narrative    [id number]  [id number]  [id number]  [id number]^ELIZABETH^FLAKITA      [facility],[place]  Echocardiography Laboratory  [address] [place] [address]     Name: RADHA RYAN  MRN: [id number]  : 1945  Study Date: 2021 11:28 AM  Age: 75 yrs  Gender: Female  Patient Location: [facility]  Reason For Study: Chest Pain  Ordering Physician: FLAKITA JOHNSON  Referring Physician: MULU MARCIAL  Performed By: Kaela Mcdowell RDCS     BSA: 1.8 m2  Height: 63 in  Weight: 162 lb  HR: 98  BP: 148/79 mmHg  ______________________________________________________________________________  Procedure  Limited Echocardiogram with portions of two-dimensional, color and spectral  Doppler performed. Contrast Optison. Optison (NDC #4548-1544-48) given  intravenously. Patient was given 5 ml mixture of 3 ml Optison and 6 ml saline.  4 ml wasted.  ______________________________________________________________________________  Interpretation Summary  Global and regional left ventricular function is hyperkinetic with an EF >70%.  Right ventricular function, chamber size, wall motion, and thickness are  normal.  No pericardial effusion is present.  Pulmonary artery systolic pressure is normal.  ______________________________________________________________________________  Left Ventricle  Global and regional left ventricular function is hyperkinetic with an EF >70%.     Right Ventricle  Right ventricular function, chamber size, wall motion, and thickness are  normal.     Tricuspid Valve  The right ventricular systolic pressure is approximated at 27.2 mmHg plus the  right atrial pressure. Pulmonary artery systolic pressure is normal.     Vessels  The inferior vena cava cannot be assessed.     Pericardium  No pericardial effusion is present.  ______________________________________________________________________________  Doppler Measurements & Calculations  TR max julissa: 261.0 cm/sec  TR max P.2 mmHg      ______________________________________________________________________________  Report approved by: Severo Lopez 04/28/2021 12:09 PM

## 2021-04-28 NOTE — PROGRESS NOTES
SPIRITUAL HEALTH SERVICES  SPIRITUAL ASSESSMENT Progress Note (Palliative Focus)  [facility] ([place]) 6B    REFERRAL SOURCE: Admission request.    Brief supportive visit with patient Olga Bailey. On first two attempts, she was with other cares, while also saying she was open to a  visit. On third attempt, she was out of room.     Plan: I will follow for spiritual support while Palliative Care is consulted.    Evelia Sawant  Palliative   Pager [phone]  [facility] Inpatient Team Consult pager [phone] (M-F 8-4:30)  After-hours Answering Service [phone]

## 2021-04-28 NOTE — PLAN OF CARE
OT-6B: Cancel. Pt not medically appropriate for therapy this AM. Pt with rapid response this AM. Will reschedule.

## 2021-04-28 NOTE — PROGRESS NOTES
[facility]  Palliative Care Daily Progress Note       Recommendations & Counseling     Symptoms:    1. Pain- minimal  - continue acetaminophen as scheduled  - use oxycodone 2.5-5mg PO Q4H prn pain OR dyspnea (see below)      2. Dyspnea:  - tolerable at rest but significant with exertion  - broaden indication for oxycodone; she would rather have medication available to lessen symptoms.  Suggest 2.5mg oxycodone q4H PRN dyspnea (or pain) and pretreating 30 minutes prior to PT/exertion for incident dyspnea.  - continuing cares per ID, Pulm Med for her recent pneumonia/TRALI.      3. Anxiety/depression chronic, worsened by current medical situation  - continue steroid taper   - continue fluoxetine, buspirone; both higher doses and serotonergic.  Monitor for potential for serotonin syndrome.  - would add IV lorazepam 0.5mg TID prn for acute anxiety/panic symptoms.   - may consider psychiatry consult for further guidance on anxiety management      Insomnia  - in addition to melatonin, consider starting doxepin 6 mg at bedtime (low dose doxepin has been shown to be safe and effective insomnia medication for geriatric population)    Nausea:   - use compazine for nausea first line.  - consider not having prn ondansetron w 5HT risks    4. Acute encephalopathy/delirium: multifactorial, concern for preexisting MCI possible; improving  - avoid deliriogenic meds as able, though likely worth  - attention to sleep hygeine, consider melatonin  - continue corticosteroid taper  - do not resume hydroxyzine at discharge.  - pending goals of care, prognosis, consider neuropsych eval post discharge    Support: daughter La Nena significant support to patient (she also needs support from palliative and other teams) and two sons involved.  Daughter notes ex= was not welcomed by the patient to be involved in the past and she requests that the patients' wishes be followed.     Goals of  Care: currently life prolonging and full code.  Olga notes today (with capacity) that she would be willing to endure CPR and mechanical ventilation in hopes of recovery, but does not want a tracheostomy or to be on mechanical ventilation for prolonged period.   She hopes to recover enough to be with her daughter/family and would like to recover to prior QOL but endorses worry she may not be able to.  Palliative recommends having care conference in days ahead (later this week?) once we see clinical trajectory for Olga.           Assessments          Olga Bailey is a 75 year old female with recent diagnosis of glioblastoma s/p resection 2/21 with prior depression/anxiety, HTN, asthma, GERD who was admitted to [facility] 3/26 and found to have PJP pneumonia, RANJIT, bilateral DVT, R retroperitoneal bleed and a month-long stay.  She then transferred from [facility] to [facility] 4/26 in hopes of receiving possible inpatient chemoradiation with temovar and radiation.  Her functional status has declined precipitously during the lengthy hospitalization.    Today, the patient was seen for:  Goals of care (cogent patient at time of visit)  Support  Dyspnea due to TRALI/PJP pneumonia    Prognosis, Goals, or Advance Care Planning was addressed today with: Yes.  Mood, coping, and/or meaning in the context of serious illness were addressed today: Yes.               Interval History:     Chart review/discussion with unit or clinical team members:   Rapid response yesterday d/t lactic acidemia (lactate 2.9)  ID saw pt and advised that PJP pneumonia is considered treated and encouraged tapering steroids.  Pulm saw pt and advised the pt finish three week course of bactrim, wean prednisone (see note for wean) and use PJP prophylaxis until <20mg prednisone, and advised against BAL.  Oncology saw pt and affirmed concerns with poor performance status, and that initiating chemoradiation not advisable until she is able  "to improve functional status.  Also MRI suggests possible recurrence of GBM in resection cavity.    Per patient or family/caregivers today:  Olga notes feeling more like herself today.  She recalls getting exhausted yesterday after the series of medical teams were done rounding; she appreciates efforts of the medical team.  She understands she is too weak for chemo and radiation at this time, and worries it would cause harm to her as her body is very weak.  She does hope to get stronger to undergo chemo and radiation in hopes of being able to spend more time with her family.  Currently worst symptoms are anxiety and feeling short of breath.  When I ask if there are any treatments she would not wish to receive, Olga tells me that \"I would be willing to go through a code but I wouldn't want to be on the ventilator/intubated indefinitely.\"  She would not want a tracheostomy.  She worries about her children and how they'll do after she dies, and notes she worrries about La Nena as she is sensitive and will be sad after she dies and she doesn't want to see her suffer.  She finds layla from being with her children and grandchildren and would like to spend time with them.      Key Palliative Symptoms:  # Pain severity the last 12 hours: low  # Dyspnea severity the last 12 hours: moderate  # Nausea severity the last 12 hours: low  # Anxiety severity the last 12 hours: severe    Patient is on opioids: assessed and bowels ok/no needed changes to plan of care today.           Review of Systems:     Besides above, an additional 3 system ROS was reviewed and is unremarkable          Medications:     I have reviewed this patient's medication profile and medications during this hospitalization.    Noted meds:    Acetaminophen 975mg PO TID  Fluoxetine 80mg daily  Buspirone 30mg BID             Physical Exam:   Vitals were reviewed  Alert woman, oriented to self, month, place.  Tachypneic, no accessory muscle use.  Scattered " crackles in lung fields  Heart s1s2 no murmur  abd soft, nontender  extr cool hands and feet.  Anxious initially during visit.  Cooperative and cogent historian.             Data Reviewed:     Reviewed recent pertinent imaging, comments:   CXR: mixed opacities bilaterally    TTE 4/28:  Interpretation Summary  Global and regional left ventricular function is hyperkinetic with an EF >70%.  Right ventricular function, chamber size, wall motion, and thickness are  normal.  No pericardial effusion is present.  Pulmonary artery systolic pressure is normal.  Reviewed recent labs, comments:   Na 134, K 3.8, creatinine 0.4, Mg 2.3, LFT near normal, wbc 13.7, hgb 9.3, plt 219      Belgica Knight MD  Fellow, Hospice and Palliative Medicine  [phone] pager  This visit was staffed with Dr Ashley Alfredo    Patient seen and evaluated with Dr. Knight.    Agree with assessment and recommendations.    Total time spent was 40 minutes,  >50% of time was spent counseling and/or coordination of care regarding pain, anxiety management and support and goals discussion for patient with GBM, respiratory failure with possible PJP and possible TRALI.    Ashley Alfredo  Palliative Care   Pager [phone]

## 2021-04-28 NOTE — PROVIDER NOTIFICATION
Troponin elevated, 0.048. Asymptomatic.  Delgado Hernandez MD notified. Ordered recheck in AM. Will continue to monitor and notify MD with any changes.

## 2021-04-28 NOTE — PLAN OF CARE
NEURO: A&O x3, disoriented to time, forgetful when first awakening, very very very anxious, given 1x dose of IV ativan with success  VITALS: Blood pressure (!) 158/87, pulse 106, temperature 97.6  F (36.4  C), temperature source Oral, resp. rate 24, weight 74.3 kg (163 lb 12.8 oz), SpO2 94 %.  PAIN: Denies  CARDIAC: SR/ST, mildly hypertensive  RESPIRATORY: 4L NC  GI: Bowel sounds active, BM x2, declined dinner  : Adequate UOP via purewick  LDA: PICC,  purewick  IV: Keppra, bactrim, TPN and lipids running currently  ACTIVITY: Repositioned q2h with Ax2, not OOB this shift  GOALS: Anxiety reduction

## 2021-04-28 NOTE — PROVIDER NOTIFICATION
04/27/21 1700   Call Information   Date of Call 04/27/21   Time of Call 1715   Name of person requesting the team Sil   Title of person requesting team RN   RRT Arrival time 1720   Time RRT ended 1730   Reason for call   Type of RRT Adult   Primary reason for call Sepsis suspected   Was patient transferred from the ED, ICU, or PACU within last 24 hours prior to RRT call? No   SBAR   Situation LA 2.9   Background 75 y.o. F w/ recent new dx of glioblastoma, now with acute hypoxic resp failure (PJP), RP bleed, B/L LE DVTs   Notable History/Conditions Cancer   Assessment VSS. main concern is anxiety. requesting ativan.    Interventions Fluid bolus   Patient Outcome   Patient Outcome Stabilized on unit   RRT Team   Attending/Primary/Covering Physician maroon medicine   Date Attending Physician notified 04/27/21   Time Attending Physician notified [id number]   Physician(s) Carlotta HALEY   Lead RN stephanie KLINE RT   Post RRT Intervention Assessment   Post RRT Assessment Stable/Improved   Date Follow Up Done 04/27/21   Time Follow Up Done 2001   Comments lactic 1.8

## 2021-04-28 NOTE — PROGRESS NOTES
Rapid Response Team Note    Assessment   In assessment a rapid response was called on Olga Bailey due to respiratory distress. This presentation is likely due to aspiration vs ARDS vs hypervolemia vs CHF vs anxiety, less likely ACS vs PE (IVC in place, on lovenox pxx) vs other and worsened by PJP PNA, glioblastoma.     Plan   -  Start BiPAP  -  ABG, LA, BCx x2, procal, CRP  -  CXR  -  EKG  -  Consider IV lasix pending clinical picture  -  IV ativan x1 now, would avoid PO given BiPAP use  -  Low threshold to broaden abx  -  The Internal Medicine primary team was able to be reached and they are in agreement with the above plan.  -  Disposition: The patient will remain on the current unit. We will continue to monitor this patient closely.  -  Reassessment and plan follow-up will be performed by the primary team    Olga is critically ill with acute on chronic hypoxic respiratory failure. These features are alarming and indicate that the patient is at imminent risk of life-threatening organ failure. Acute deterioration is being managed by the following critical interventions: see above    Total Critical Care time spent by me, excluding procedures, was 35 minutes.  Wendy Hunter PA-C  [facility] RRT AMCOM Job Code Contact #[phone]    Hospital Course   Brief Summary of events leading to rapid response:   Patient woke this morning around 7 am. RR went from teens to 40s and O2 needs increased from 4 to 6L NC due to sats in the mid-80s. No change after 45 minutes prompting RRT. Patient reports feeling very anxious. She has chest tightness with dyspnea and some pain. Similar to prior anxiety attack. Patient was placed on CPAP per nursing without improvement. Oral ativan was offered by primary team but had not been given yet as it needed to be crushed/put in pudding/etc. Patient denies fever or chills. No confusion     , procal <0.05, CRP 13, WBC 13.2 4/27    Admission Diagnosis:   Hypoxia [R09.02]      Physical Exam   Temp: 97.7  F (36.5  C) Temp  Min: 97.5  F (36.4  C)  Max: 98.6  F (37  C)  Resp: (!) 38 Resp  Min: 18  Max: 40  SpO2: 95 % SpO2  Min: 91 %  Max: 99 %  Pulse: 115 Pulse  Min: 89  Max: 122    No data recorded  BP: (!) 153/89 Systolic (24hrs), Av , Min:138 , Max:165   Diastolic (24hrs), Av, Min:65, Max:91     I/Os: I/O last 3 completed shifts:  In: 1660.33 [I.V.:1075]  Out: 1700 [Urine:1700]     Exam:   General: acutely ill appearing  Mental Status: AAOx4.  CV: Tachycardic to 110s, no murmur noted  Resp: Tachypnea from low 40s to low 50s, eventually slows to upper 30s on BiPAP. Shallow breathing  Abdomen: Soft, non-tender with active bowel sounds  Peripheral: Trace BLE peripheral edema, distal pulses intact    Significant Results and Procedures   Lactic Acid:   Recent Labs   Lab Test 21  1935 21  1711 21  1605 21  0250 21  0635 21  2341 21  2341   LACT 1.8  --   --   --  2.0  --  1.1   LACTS  --  2.9* 1.5 1.2  --    < >  --     < > = values in this interval not displayed.     CBC:   Recent Labs   Lab Test 21  0535 21  0548 21  1602   WBC 13.7* 13.2* 15.8*   HGB 9.3* 8.7* 9.2*   HCT 29.1* 26.7* 28.5*    216 220        Sepsis Evaluation   The patient is known to have an infection.  Olga Bailey meets SIRS criteria but does NOT have a lactate >2 or other evidence of acute organ damage.  These vital sign, lab and physical exam findings are consistent with SEPSIS.    Sepsis Time-Zero: Previously diagnosed     Anti-infectives (From now, onward)    Start     Dose/Rate Route Frequency Ordered Stop    21 1900  sulfamethoxazole-trimethoprim (BACTRIM) 320 mg in D5W 570 mL intermittent infusion      320 mg Intravenous EVERY 8 HOURS 21 0249          Current antibiotic coverage is appropriate for source of infection.

## 2021-04-29 ENCOUNTER — APPOINTMENT (OUTPATIENT)
Dept: GENERAL RADIOLOGY | Facility: CLINIC | Age: 76
DRG: 177 | End: 2021-04-29
Attending: STUDENT IN AN ORGANIZED HEALTH CARE EDUCATION/TRAINING PROGRAM
Payer: MEDICARE

## 2021-04-29 ENCOUNTER — DOCUMENTATION ONLY (OUTPATIENT)
Dept: OTHER | Facility: CLINIC | Age: 76
End: 2021-04-29

## 2021-04-29 ENCOUNTER — DOCUMENTATION ONLY (OUTPATIENT)
Dept: RADIATION ONCOLOGY | Facility: CLINIC | Age: 76
End: 2021-04-29

## 2021-04-29 ENCOUNTER — APPOINTMENT (OUTPATIENT)
Dept: OCCUPATIONAL THERAPY | Facility: CLINIC | Age: 76
DRG: 177 | End: 2021-04-29
Attending: STUDENT IN AN ORGANIZED HEALTH CARE EDUCATION/TRAINING PROGRAM
Payer: MEDICARE

## 2021-04-29 LAB
ALBUMIN SERPL-MCNC: 2.6 G/DL (ref 3.4–5)
ALP SERPL-CCNC: 116 U/L (ref 40–150)
ALT SERPL W P-5'-P-CCNC: 70 U/L (ref 0–50)
ANION GAP SERPL CALCULATED.3IONS-SCNC: 4 MMOL/L (ref 3–14)
AST SERPL W P-5'-P-CCNC: 34 U/L (ref 0–45)
BILIRUB SERPL-MCNC: 0.3 MG/DL (ref 0.2–1.3)
BUN SERPL-MCNC: 29 MG/DL (ref 7–30)
CALCIUM SERPL-MCNC: 8.6 MG/DL (ref 8.5–10.1)
CHLORIDE SERPL-SCNC: 101 MMOL/L (ref 94–109)
CO2 SERPL-SCNC: 30 MMOL/L (ref 20–32)
CREAT SERPL-MCNC: 0.43 MG/DL (ref 0.52–1.04)
ERYTHROCYTE [DISTWIDTH] IN BLOOD BY AUTOMATED COUNT: 18.6 % (ref 10–15)
GFR SERPL CREATININE-BSD FRML MDRD: >90 ML/MIN/{1.73_M2}
GLUCOSE BLDC GLUCOMTR-MCNC: 132 MG/DL (ref 70–99)
GLUCOSE BLDC GLUCOMTR-MCNC: 135 MG/DL (ref 70–99)
GLUCOSE BLDC GLUCOMTR-MCNC: 157 MG/DL (ref 70–99)
GLUCOSE BLDC GLUCOMTR-MCNC: 159 MG/DL (ref 70–99)
GLUCOSE BLDC GLUCOMTR-MCNC: 170 MG/DL (ref 70–99)
GLUCOSE BLDC GLUCOMTR-MCNC: 204 MG/DL (ref 70–99)
GLUCOSE SERPL-MCNC: 110 MG/DL (ref 70–99)
HCT VFR BLD AUTO: 29.5 % (ref 35–47)
HGB BLD-MCNC: 9.2 G/DL (ref 11.7–15.7)
INTERPRETATION ECG - MUSE: NORMAL
LACTATE BLD-SCNC: 2.2 MMOL/L (ref 0.7–2)
MAGNESIUM SERPL-MCNC: 2.4 MG/DL (ref 1.6–2.3)
MCH RBC QN AUTO: 29.5 PG (ref 26.5–33)
MCHC RBC AUTO-ENTMCNC: 31.2 G/DL (ref 31.5–36.5)
MCV RBC AUTO: 95 FL (ref 78–100)
PHOSPHATE SERPL-MCNC: 2.6 MG/DL (ref 2.5–4.5)
PHOSPHATE SERPL-MCNC: 2.7 MG/DL (ref 2.5–4.5)
PLATELET # BLD AUTO: 233 10E9/L (ref 150–450)
POTASSIUM SERPL-SCNC: 3.5 MMOL/L (ref 3.4–5.3)
PROT SERPL-MCNC: 5.3 G/DL (ref 6.8–8.8)
RBC # BLD AUTO: 3.12 10E12/L (ref 3.8–5.2)
SODIUM SERPL-SCNC: 135 MMOL/L (ref 133–144)
WBC # BLD AUTO: 13.8 10E9/L (ref 4–11)

## 2021-04-29 PROCEDURE — 250N000011 HC RX IP 250 OP 636: Performed by: INTERNAL MEDICINE

## 2021-04-29 PROCEDURE — 250N000013 HC RX MED GY IP 250 OP 250 PS 637: Performed by: STUDENT IN AN ORGANIZED HEALTH CARE EDUCATION/TRAINING PROGRAM

## 2021-04-29 PROCEDURE — 83605 ASSAY OF LACTIC ACID: CPT | Performed by: HOSPITALIST

## 2021-04-29 PROCEDURE — 85027 COMPLETE CBC AUTOMATED: CPT | Performed by: STUDENT IN AN ORGANIZED HEALTH CARE EDUCATION/TRAINING PROGRAM

## 2021-04-29 PROCEDURE — 258N000003 HC RX IP 258 OP 636: Performed by: STUDENT IN AN ORGANIZED HEALTH CARE EDUCATION/TRAINING PROGRAM

## 2021-04-29 PROCEDURE — 258N000003 HC RX IP 258 OP 636: Performed by: HOSPITALIST

## 2021-04-29 PROCEDURE — 999N000157 HC STATISTIC RCP TIME EA 10 MIN

## 2021-04-29 PROCEDURE — 250N000013 HC RX MED GY IP 250 OP 250 PS 637: Performed by: INTERNAL MEDICINE

## 2021-04-29 PROCEDURE — 80053 COMPREHEN METABOLIC PANEL: CPT | Performed by: STUDENT IN AN ORGANIZED HEALTH CARE EDUCATION/TRAINING PROGRAM

## 2021-04-29 PROCEDURE — 71045 X-RAY EXAM CHEST 1 VIEW: CPT | Mod: 26 | Performed by: RADIOLOGY

## 2021-04-29 PROCEDURE — 99233 SBSQ HOSP IP/OBS HIGH 50: CPT | Performed by: INTERNAL MEDICINE

## 2021-04-29 PROCEDURE — 84100 ASSAY OF PHOSPHORUS: CPT | Performed by: HOSPITALIST

## 2021-04-29 PROCEDURE — 99223 1ST HOSP IP/OBS HIGH 75: CPT | Mod: 24 | Performed by: STUDENT IN AN ORGANIZED HEALTH CARE EDUCATION/TRAINING PROGRAM

## 2021-04-29 PROCEDURE — 71045 X-RAY EXAM CHEST 1 VIEW: CPT

## 2021-04-29 PROCEDURE — 250N000009 HC RX 250: Performed by: HOSPITALIST

## 2021-04-29 PROCEDURE — 84100 ASSAY OF PHOSPHORUS: CPT | Performed by: STUDENT IN AN ORGANIZED HEALTH CARE EDUCATION/TRAINING PROGRAM

## 2021-04-29 PROCEDURE — C9113 INJ PANTOPRAZOLE SODIUM, VIA: HCPCS

## 2021-04-29 PROCEDURE — 99222 1ST HOSP IP/OBS MODERATE 55: CPT | Performed by: PSYCHIATRY & NEUROLOGY

## 2021-04-29 PROCEDURE — 97166 OT EVAL MOD COMPLEX 45 MIN: CPT | Mod: GO | Performed by: OCCUPATIONAL THERAPIST

## 2021-04-29 PROCEDURE — 99233 SBSQ HOSP IP/OBS HIGH 50: CPT | Mod: GC | Performed by: HOSPITALIST

## 2021-04-29 PROCEDURE — 250N000011 HC RX IP 250 OP 636: Performed by: STUDENT IN AN ORGANIZED HEALTH CARE EDUCATION/TRAINING PROGRAM

## 2021-04-29 PROCEDURE — 250N000011 HC RX IP 250 OP 636

## 2021-04-29 PROCEDURE — 97535 SELF CARE MNGMENT TRAINING: CPT | Mod: GO | Performed by: OCCUPATIONAL THERAPIST

## 2021-04-29 PROCEDURE — 250N000013 HC RX MED GY IP 250 OP 250 PS 637: Performed by: HOSPITALIST

## 2021-04-29 PROCEDURE — 36592 COLLECT BLOOD FROM PICC: CPT | Performed by: HOSPITALIST

## 2021-04-29 PROCEDURE — 99207 PR CDG-CODE CATEGORY CHANGED: CPT | Performed by: PSYCHIATRY & NEUROLOGY

## 2021-04-29 PROCEDURE — 83735 ASSAY OF MAGNESIUM: CPT | Performed by: STUDENT IN AN ORGANIZED HEALTH CARE EDUCATION/TRAINING PROGRAM

## 2021-04-29 PROCEDURE — 250N000013 HC RX MED GY IP 250 OP 250 PS 637

## 2021-04-29 PROCEDURE — 250N000012 HC RX MED GY IP 250 OP 636 PS 637: Performed by: STUDENT IN AN ORGANIZED HEALTH CARE EDUCATION/TRAINING PROGRAM

## 2021-04-29 PROCEDURE — 999N001017 HC STATISTIC GLUCOSE BY METER IP

## 2021-04-29 PROCEDURE — 36415 COLL VENOUS BLD VENIPUNCTURE: CPT | Performed by: STUDENT IN AN ORGANIZED HEALTH CARE EDUCATION/TRAINING PROGRAM

## 2021-04-29 PROCEDURE — 120N000005 HC R&B MS OVERFLOW UMMC

## 2021-04-29 PROCEDURE — 97530 THERAPEUTIC ACTIVITIES: CPT | Mod: GO | Performed by: OCCUPATIONAL THERAPIST

## 2021-04-29 RX ORDER — FUROSEMIDE 40 MG
40 TABLET ORAL ONCE
Status: CANCELLED | OUTPATIENT
Start: 2021-04-29

## 2021-04-29 RX ORDER — LORAZEPAM 2 MG/ML
0.5 INJECTION INTRAMUSCULAR EVERY 4 HOURS PRN
Status: DISCONTINUED | OUTPATIENT
Start: 2021-04-29 | End: 2021-05-01

## 2021-04-29 RX ORDER — FUROSEMIDE 10 MG/ML
40 INJECTION INTRAMUSCULAR; INTRAVENOUS ONCE
Status: COMPLETED | OUTPATIENT
Start: 2021-04-29 | End: 2021-04-29

## 2021-04-29 RX ORDER — QUETIAPINE FUMARATE 25 MG/1
50 TABLET, FILM COATED ORAL AT BEDTIME
Status: DISCONTINUED | OUTPATIENT
Start: 2021-04-29 | End: 2021-05-15 | Stop reason: HOSPADM

## 2021-04-29 RX ORDER — QUETIAPINE FUMARATE 25 MG/1
25 TABLET, FILM COATED ORAL 2 TIMES DAILY
Status: DISCONTINUED | OUTPATIENT
Start: 2021-04-29 | End: 2021-05-15 | Stop reason: HOSPADM

## 2021-04-29 RX ORDER — LORAZEPAM 0.5 MG/1
0.5 TABLET ORAL 3 TIMES DAILY PRN
Status: DISCONTINUED | OUTPATIENT
Start: 2021-04-29 | End: 2021-04-30

## 2021-04-29 RX ORDER — SULFAMETHOXAZOLE AND TRIMETHOPRIM 400; 80 MG/1; MG/1
1 TABLET ORAL DAILY
Status: DISCONTINUED | OUTPATIENT
Start: 2021-04-29 | End: 2021-04-29

## 2021-04-29 RX ADMIN — LORAZEPAM 0.5 MG: 2 INJECTION INTRAMUSCULAR; INTRAVENOUS at 12:05

## 2021-04-29 RX ADMIN — LEVETIRACETAM 250 MG: 100 INJECTION, SOLUTION, CONCENTRATE INTRAVENOUS at 21:39

## 2021-04-29 RX ADMIN — ACETAMINOPHEN 975 MG: 325 TABLET, FILM COATED ORAL at 08:41

## 2021-04-29 RX ADMIN — BUSPIRONE HYDROCHLORIDE 30 MG: 15 TABLET ORAL at 08:41

## 2021-04-29 RX ADMIN — Medication 2.5 MG: at 20:26

## 2021-04-29 RX ADMIN — SODIUM PHOSPHATE, MONOBASIC, MONOHYDRATE 9 MMOL: 276; 142 INJECTION, SOLUTION INTRAVENOUS at 09:40

## 2021-04-29 RX ADMIN — ACYCLOVIR: 50 OINTMENT TOPICAL at 20:31

## 2021-04-29 RX ADMIN — QUETIAPINE 50 MG: 50 TABLET ORAL at 21:38

## 2021-04-29 RX ADMIN — LINACLOTIDE 290 MCG: 145 CAPSULE, GELATIN COATED ORAL at 08:42

## 2021-04-29 RX ADMIN — ACETAMINOPHEN 975 MG: 325 TABLET, FILM COATED ORAL at 16:37

## 2021-04-29 RX ADMIN — LACTULOSE 3 G: 20 SOLUTION ORAL at 08:52

## 2021-04-29 RX ADMIN — SULFAMETHOXAZOLE AND TRIMETHOPRIM 320 MG: 80; 16 INJECTION, SOLUTION, CONCENTRATE INTRAVENOUS at 06:26

## 2021-04-29 RX ADMIN — ACYCLOVIR: 50 OINTMENT TOPICAL at 14:01

## 2021-04-29 RX ADMIN — HYDROXYZINE HYDROCHLORIDE 50 MG: 25 TABLET, FILM COATED ORAL at 08:41

## 2021-04-29 RX ADMIN — PREDNISONE 50 MG: 20 TABLET ORAL at 10:44

## 2021-04-29 RX ADMIN — QUETIAPINE 25 MG: 25 TABLET ORAL at 17:49

## 2021-04-29 RX ADMIN — ENOXAPARIN SODIUM 40 MG: 100 INJECTION SUBCUTANEOUS at 16:36

## 2021-04-29 RX ADMIN — BUSPIRONE HYDROCHLORIDE 30 MG: 15 TABLET ORAL at 20:28

## 2021-04-29 RX ADMIN — QUETIAPINE 25 MG: 25 TABLET ORAL at 14:01

## 2021-04-29 RX ADMIN — FLUOXETINE 80 MG: 20 CAPSULE ORAL at 08:41

## 2021-04-29 RX ADMIN — OXYCODONE HYDROCHLORIDE 5 MG: 5 TABLET ORAL at 08:41

## 2021-04-29 RX ADMIN — LORAZEPAM 0.5 MG: 2 INJECTION INTRAMUSCULAR; INTRAVENOUS at 08:10

## 2021-04-29 RX ADMIN — LEVETIRACETAM 250 MG: 100 INJECTION, SOLUTION, CONCENTRATE INTRAVENOUS at 09:35

## 2021-04-29 RX ADMIN — INSULIN ASPART 1 UNITS: 100 INJECTION, SOLUTION INTRAVENOUS; SUBCUTANEOUS at 08:53

## 2021-04-29 RX ADMIN — Medication 2.5 MG: at 14:01

## 2021-04-29 RX ADMIN — MESALAMINE 1000 MG: 1000 SUPPOSITORY RECTAL at 21:31

## 2021-04-29 RX ADMIN — ACYCLOVIR: 50 OINTMENT TOPICAL at 17:54

## 2021-04-29 RX ADMIN — Medication: at 20:24

## 2021-04-29 RX ADMIN — PANTOPRAZOLE SODIUM 40 MG: 40 INJECTION, POWDER, FOR SOLUTION INTRAVENOUS at 18:07

## 2021-04-29 RX ADMIN — LACTULOSE 3 G: 20 SOLUTION ORAL at 17:50

## 2021-04-29 RX ADMIN — AMLODIPINE BESYLATE 10 MG: 10 TABLET ORAL at 08:41

## 2021-04-29 RX ADMIN — PANTOPRAZOLE SODIUM 40 MG: 40 INJECTION, POWDER, FOR SOLUTION INTRAVENOUS at 08:54

## 2021-04-29 RX ADMIN — INSULIN ASPART 1 UNITS: 100 INJECTION, SOLUTION INTRAVENOUS; SUBCUTANEOUS at 17:29

## 2021-04-29 RX ADMIN — FUROSEMIDE 40 MG: 10 INJECTION, SOLUTION INTRAVENOUS at 12:59

## 2021-04-29 RX ADMIN — I.V. FAT EMULSION 250 ML: 20 EMULSION INTRAVENOUS at 20:24

## 2021-04-29 RX ADMIN — ACYCLOVIR: 50 OINTMENT TOPICAL at 08:52

## 2021-04-29 ASSESSMENT — ACTIVITIES OF DAILY LIVING (ADL)
ADLS_ACUITY_SCORE: 24
ADLS_ACUITY_SCORE: 25
ADLS_ACUITY_SCORE: 23
ADLS_ACUITY_SCORE: 23
ADLS_ACUITY_SCORE: 25
PREVIOUS_RESPONSIBILITIES: MEAL PREP;HOUSEKEEPING;LAUNDRY;SHOPPING;YARDWORK;MEDICATION MANAGEMENT;FINANCES;DRIVING;WORK
ADLS_ACUITY_SCORE: 24

## 2021-04-29 NOTE — PROVIDER NOTIFICATION
Provider paged about worsening SOB/anxiety. RR in the 40's. Pt given PRN IV ativan and O2 turned up from 4L to 7L. Pt had desaturated to 88% SPO2. Pt placed on bipap at 35%.     Nursing to monitor for 1/2 an hr. If no improvement, will page provider again for possible extra dose of ativan.     Addendum: 30 min after ativan RR remains 35-42, pt states still somewhat SOB. Bipap still at 35% FiO2  Blood pressure (!) 152/87, pulse 115, temperature 97.7  F (36.5  C), temperature source Axillary, resp. rate (!) 41, weight 73.8 kg (162 lb 11.2 oz), SpO2 94 %.    Provider returned page, additional 0.5mg of IV ativan given x1. Pt now on 45% bipap.     Addendum #2: RR 26-40, remains on bipap at 45%, SPO2 97%. Provider updated and aware and will come lay eyes on the pt. For now pt appears comfortable and is not in distress.

## 2021-04-29 NOTE — PLAN OF CARE
PT: cancel- pt working with OT in AM, not available at time of attempt in PM. Unable to check back 2/2 schedule demands. Will reschedule.

## 2021-04-29 NOTE — PROGRESS NOTES
[facility]    Progress Note - Maroon 3 Service        Date of Admission:  4/26/2021    Assessment & Plan   Olga Bailey is a 75 y.o. F w/ GBM s/p resection (Feb 2021), depression/anxiety, HTN, asthma, and GERD, who was transferred to [facility] for consideration of inpt rad/onc treatment while she continues to be treated for complex presentation of acute hypoxic respiratory failure (due to possible PJP pneumonia and/or TRALI), 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    Today:  - IV lasix 40 mg x1  - Psych consult  - Start scheduled seroquel and continue PRN ativan  - Start scheduled oxycodone per palliative care recs  - Plan for care conference Monday 5/3   - Low threshold to obtain CT PE if pt becomes more hypoxic, back down to 4L NC this afternoon so will hold off      Acute hypoxic respiratory failure   Bilateral Pneumonia; presumed PJP  Concern for transfusion-related acute lung injury (TRALI)  Concern for R hemidiaphragm paralysis  Dyspneic/hypoxic in late March for admission at OSH. Initially treated with ceftri/doxy. ID switched to course of Zosyn/doxy with Bactrim treatment dosing for PJP (had been on steroids after admission for glioblastoma resection; Fungitell positive but Galactomannan negative). Unable to get adequate sputum samples for diagnosis of PJP. Improved with the Bactrim until several days after RP bleed (see below). W/u for PE, COVID, and respiratory viral panel was negative. Procal low. Bilateral lung opacities present. Concern for ongoing PJP infection (Bactrim was re-started), inflammatory lung condition (increased steroids), volume overload (diuresed for several days with IV Lasix). Imaging shows R hemidiaphragm elevation (concerning for paralysis?), and opacities appear to be improving as are oxygen requirements (weaned from high-flow to Oxymask 4-7L now). Pulmonology at OSH was considering bronch if no  improvement, though pulm and ID here find no benefit to pursuing this now with her improvement. Has had several RRTs for tachypnea. Quickly improved with PRN anxiety medication and Bipap. VBGs normal (although had been on BiPAP ~15 minutes), EKG, CXR, not concerning.  - Pulmonology consulted, following peripherally  - Work on better control anxiety as below   - IV lasix 40 mg x1, re-assess diuresis daily  - pulm, ID consults   - wean steroids: from methylpred IV 62.5 mg BID -> prednisone 60 mg x 2 d (starting 4/28) -> pred 40 mg x 2 d -> pred 30 mg x 1 d -> pred 20 mg x 1 d -> pred 10 mg x 1 d   - steroid taper ok from onc perspective   - Will monitor for signs of adrenal insufficiency     - both agree on holding off on bronch (this is also family/pt's preference)  - PJP ppx: Bactrim single strength qday x 1.5 mo after steroids end  - Palliative care consulted for assistance in symptom management; appreciate recs   - PJP stain, PCR; conventional sputum culture ordered if patient can produce sputum   - f/u blood cultures - NGTD  - wean O2 as able to keep sats > 92%  - Bipap when sleeping and PRN for comfort  - Scheduled oxycodone 2.5 mg TID (for ongoing dyspnea)  - SLP consulted - FLD and nectar thick liquids   >Will touch base to consider if appropriate for pureed diet   - Low threshold to obtain CT PE if pt has persistent increased O2 requirements and tachycardia despite anxiety interventions     Glioblastoma Multiforme  Left frontoparietal brain glioblastoma status post resection 2/23/2021.  Seen by radiation oncology 3/24 recommended XRT and chemo radiation. However subsequently admitted for resp failure. MRI brain here signs of possible disease recurrence. Hem/onc concerned that she might not be a candidate for chemotherapy or radiation currently due to poor performance status.  -Rad/onc following; appreciate recs  -Heme/onc consulted, appreciate recs   - Patient not strong enough for chemo, but considering  initiation of short course of radiation while inpatient   - would consider temozolomide at late date pending clinical course (needs to improve performance status)  - Anticipate care conference 5/3/21    Bilateral lower extremity DVT  Right retroperitoneal hematoma   On 3/29 at outside hospital patient's O2 needs increased, duplex ultrasound revealed bilateral lower extremity DVT. CT PE negative for embolism.  Treated with IV heparin and transitioned to Lovenox 70 mg.  On 4/14 this was complicated by retroperitoneal bleed, requiring 4 units of packed red blood cells.  Lovenox was held, and IVC filter was placed on 4/16.  Vascular surgery was involved, and a CT abdomen was stable bleed so no surgical intervention was required.  Eventually resumed on prophylactic 40mg of Lovenox twice daily.  -Continue 40 mg Lovenox qday  -hem/onc consulted; appreciate recs   - pain plan:              - Tylenol 975 mg q8h brianna              - oxycodone 2.5-5 mg q4h PRN for pain              - discontinued Dilaudid to avoid delirium     Major depressive disorder, recurrent, in partial remission  Anxiety  Follows with psychiatry and psychology as outpatint.  Saw health psychology during admission for GBM, made plan for outpatient follow-up and med adjustment,  but was unable.  Given prolonged admission patient could benefit from inpatient consultation again and patient requesting this.  - Psychiatry consulted, appreciate recs  - Palliative recs appreciated;  - Seroquel 25 mg BID + 50 mg at bedtime  - Ativan 0.5 mg TID PRN  - Scheduled doxepin PRN at night for sleep  - c/t PTA Buspar  - PTA prozac (dose reduced from 80 to 60 mg)  - Health psychology consult; appreciate recs     Ileus  Began after bleed at outside hospital 4/14; was on TPN for nutrition.  GI was consulted for assistance with bowel management.  With escalating bowel regimen finally had small BM 4/25 after tap water enema, had some liquid stool 4/27. AXR shows non-obstructive  bowel gas pattern.  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 Urine sodium 50 while her sodium was 128, certainly consistent with this. Neurologic etiology with her GBM resection likely. Consistently 130-131 recent days.   - trend Na; consider fluid restriction if any concern for worsening Na     Shock Liver, improving  LFTs sharply up after RP bleed at OSH, Bryant due to shock liver. LFT's have steadily trended down since. Only AST remains elevated 107 4/26.   -Recheck CMP in am    Hyperglycemia   Steroid induced. Intermittently needed insulin at OSH.   -Low sliding scale insulin  -Gluc checks     Leukocytosis  Ddx is infection vs steroid-induced.  - trend CBC     Hypertension  Sinus tachycardia  Started on metoprolol at OSH for sinus tachycardia.  - c/t PTA amlodipine 10 mg qday  - hold BB    Chronic medical problems:  - albuterol PRN  - BIPAP at bedtime (on CPAP at home) and for comfort  - GERD: back to PTA PO PPI     Diet: Full Liquid Diet Nectar Thickened Liquids (pre-thickened or use instant food thickener)  Snacks/Supplements Adult: Ensure Enlive; Between Meals  parenteral nutrition - ADULT compounded formula  parenteral nutrition - ADULT compounded formula    Fluids: Oral; received significant fluid with bacterim   Lines: PICC line  DVT Prophylaxis: Enoxaparin (Lovenox) SQ  Martino Catheter: not present  Code Status: Full Code         Disposition Plan   Expected discharge: > 7 days, recommended to transitional care unit once respiratory status improves, plan for rehab and chemo/radiation in place .  Entered: Melina Sanabria MD 04/29/2021, 6:22 PM     The patient's care was discussed with the Attending Physician, Dr. Tomlin.    Melina Sanabria,  "MD  Internal Medicine PGY-1  [facility] [address] [place] [address]  Please see sign in/sign out for up to date coverage information      ______________________________________________________________________    Interval History    Rapid response called in the evening for tachypnea (RR in the 40's) and tachycardia (~110).  Lactate drawn elevated at 2.9.  Given 250 mL bolus, repeat lactate 1.8.  EKG unchanged.  ABGs showed pH 7.46 slightly up from 7.42 earlier in the day, PCO2 of 37 slightly down from 43 on room air.  Chest x-ray obtained showed improvement in right lung.  Troponin obtained and slightly elevated at 0.048. Patient improved symptomatically on BiPAP and IV ativan, hydroxizine. Was eventually stable on 4 L on per NC the rest of the night. Troponin in the am normalized at 0.041.     RR called again in the am for similar complaints. RR was in the 40's She did de-sat to ~86% on RA, was placed on BIPAP. Also complained of anxiety and was given PRN IV ativan. RRT ordered lactate, CXR, EKG.       Patient seen after BiPAP was placed; had difficult responding to questions, but she reported both feeling increasingly SOB and anxious this am. Could not tell that either symptoms was more predominant. Chest felt \"tight.\" but no pain. Abdominal pain stable.      4 Point ROS obtained and negative except as noted above.     Data reviewed today: I reviewed all medications, new labs and imaging results over the last 24 hours. I personally reviewed   Physical Exam   Vital Signs: Temp: 98.9  F (37.2  C) Temp src: Axillary BP: (!) 143/82 Pulse: 118   Resp: (!) 32 SpO2: 97 % O2 Device: BiPAP/CPAP Oxygen Delivery: 4 LPM  Weight: 156 lbs 8.43 oz   General Appearance: lying propped up in bed, mildly increased work of breathing on bipap, appears to be in mild distress   Eyes: no scleral icterus, EOMI  HEENT: neck supple, normocephalic, BIPAP mask in place  Respiratory: mild accessory muscle use " on 4L NC, coarse breath sounds in apices bilaterally   Cardiovascular:  Tachycardia, regular rhythm, normal S1/S2, no murmurs appreciated, intact distal pulses  GI: soft, bowel sounds present, non-tender to palpation  Skin: scattered bruises over upper exposed upper extremities, no rash noted  Musculoskeletal: no peripheral edema  Neurologic: moves all extremities spontaneously, follows commands.   Psychiatric: Mild distress, anxious appearing     Data   Recent Labs   Lab 04/29/21  0545 04/28/21  1601 04/28/21  0831 04/28/21  0535 04/28/21  0026 04/27/21  0548 04/27/21  0548   WBC 13.8*  --   --  13.7*  --   --  13.2*   HGB 9.2*  --   --  9.3*  --   --  8.7*   MCV 95  --   --  91  --   --  89     --   --  219  --   --  216   INR  --   --   --   --   --   --  1.02     --   --  134  --   --  132*   POTASSIUM 3.5 5.3  --  3.8  --    < > 3.6   CHLORIDE 101  --   --  102  --   --  100   CO2 30  --   --  26  --   --  25   BUN 29  --   --  16  --   --  16   CR 0.43*  --   --  0.40*  --   --  0.36*   ANIONGAP 4  --   --  6  --   --  8   ESTIVEN 8.6  --   --  8.7  --   --  8.5   *  --   --  155*  --   --  166*   ALBUMIN 2.6*  --   --  2.5*  --   --  2.3*   PROTTOTAL 5.3*  --   --  5.4*  --   --  5.2*   BILITOTAL 0.3  --   --  0.3  --   --  0.4   ALKPHOS 116  --   --  116  --   --  126   ALT 70*  --   --  82*  --   --  92*   AST 34  --   --  35  --   --  37   TROPI  --   --  0.044 0.041 0.048*   < >  --     < > = values in this interval not displayed.     Recent Results (from the past 24 hour(s))   XR Chest Port 1 View    Narrative    EXAM: XR CHEST PORT 1 VIEW  4/29/2021 10:04 AM     HISTORY:  Hypoxia    COMPARISON: Chest radiograph dated 4/28/2021    FINDINGS: A single AP view of the chest is obtained. Right upper  extremity PICC tip projects over the low SVC. Postoperative changes of  cervical spinal fusion. Trachea is midline. Stable cardiac silhouette.  Diffuse mixed airspace opacities. Elevation of the  right  hemidiaphragm. No pneumothorax. Stable small right pleural effusion.      Impression    IMPRESSION:   Stable small right pleural effusion and diffuse mixed airspace  opacities.    I have personally reviewed the examination and initial interpretation  and I agree with the findings.    CHANTE WASHINGTON MD

## 2021-04-29 NOTE — PROGRESS NOTES
"BRIEF SOCIAL WORK NOTE:    SW was asked to assist in arranging care conference by [facility] Team. Initially requesting tomorrow however time does not work for Palliative Team. Now requesting Monday care conference. [facility] requesting Primary Team, Palliative Team and Radiation Oncology to attend.     SW confirmed with dtr (La Nena) that she is able to attend in person a care conference on Monday at 1300. Once this time has been confirmed with all necessary teams she will update her brother Dario, and CAMRON will update her brother Sudheer (Ph: [phone]). Brothers will need to attend by iPad.     CAMRON confirmed with [facility] TERA Miranda that Monday at 1300 will work for [facility] Team. Ruth will plan to update Radiation Oncology.     CAMRON paged Palliative Team at 1520 to confirm if that time will work for their team; awaiting return call.     CAMRON paged [facility] 3 Team to update that a care conference is attempting to be organized for Monday. Waiting for confirmation from Palliative that they can attend at 1300.     iPad Instructions:  Pt's family can enter the call either by audio only, or audio/video.   -For Audio, family will call:   -Phone #: [phone]   -Enter Patient ID #: [id number]   -Enter PIN: [id number]  -For Audio/Video, family will initiate a video session from a video capable computer, tablet or smart phone:   -Family will use search engine and go to \"[email or web address]\"   -Press \"Join Meeting\"   -Enter Patient ID #: [id number]   -Enter PIN: [id number]   -Review information to ensure they are connecting to the appropriate patient   -Enter their name   -Click \"Join Meeting\"    CAMRON to continue to follow and assist with discharge plan as appropriate.     MACARIO Sorto, LICSW  6B Intermediate Care Unit   [facility]  Phone: [phone]  Pager: [phone]    "

## 2021-04-29 NOTE — PROVIDER NOTIFICATION
04/29/21 0800   Call Information   Date of Call 04/29/21   Time of Call 0802   Name of person requesting the team Regina   Title of person requesting team RN   RRT Arrival time 0805   Time RRT ended 0845   Reason for call   Type of RRT Adult   Primary reason for call Respiratory;Sepsis suspected   Respiratory Rate greater than 24   Sepsis Suspected Elevated Lactate level   Was patient transferred from the ED, ICU, or PACU within last 24 hours prior to RRT call? No   SBAR   Situation LA 2.2, tachypnea   Background 75 y.o. F w/ recent new dx of glioblastoma, now with acute hypoxic resp failure (PJP), RP bleed, B/L LE DVTs   Notable History/Conditions Cancer;Hypertension   Assessment slight anxiety, tachypnea   Interventions Meds   RRT Team   Attending/Primary/Covering Physician [facility] 3   Date Attending Physician notified 04/29/21   Time Attending Physician notified 0802   Physician(s) Wendy HALEY   Lead RN Joe Tapia   RT Janice   Post RRT Intervention Assessment   Post RRT Assessment Stable/Improved   Date Follow Up Done 04/29/21   Time Follow Up Done 1114

## 2021-04-29 NOTE — PROVIDER NOTIFICATION
Time of notification: 08:45 AM  Provider notified: JANETTE Student - Yo *[id number]; RRT - Elsa    Patient status: Sepsis protocol triggered at 0645, resulting lactic acid 2.2. RRT called per site protocol. Pt stable without change in status. RR & HR increased related to anxiety episode upon awaking.     Orders received: No changes in plan of care at this time. RN will given PRN IV ativan and place pt on BiPAP until she feels more comfortable. Continue to monitor closely.

## 2021-04-29 NOTE — PROGRESS NOTES
[facility]  Palliative Care Daily Progress Note       Recommendations & Counseling     Symptoms:   Pain- minimal  - continue acetaminophen as scheduled  - use oxycodone 2.5-5mg PO Q4H prn pain OR dyspnea (see below)      Dyspnea: severe plus hypoxic respiratory failure  - worse over past 24 hrs.    - broaden indication for oxycodone; she would rather have medication available to lessen symptoms.  Yesterday started on 2.5-5mg oxycodone q4H PRN dyspnea (or pain) and pretreating 30 minutes prior to PT/exertion for incident dyspnea.Had one dose  --Schedule oxycodone 2.5mg TID for dyspnea during daytime when awake.  - continuing cares per ID, Pulm Med for her recent pneumonia/TRALI.     Anxiety/depression chronic, worsened by current medical situation  - continue steroid taper   - continue fluoxetine, buspirone; both higher doses and serotonergic.  Monitor for potential for serotonin syndrome.  Off bupropion since surgery d/t potential risl of seizure  - continue IV lorazepam 0.5mg IV q4H prn for acute anxiety/panic symptoms.   - appreciate input from psychiatry--starting seroquel 12.5-25mg in AM and 1800 at PM, and 25-50mg at bedtime.    - health psychology to continue working with Olga     Insomnia  - added seroquel     Nausea:   - use compazine for nausea first line.  - consider not having prn ondansetron w 5HT risks     Constipation:  -appears stable on multimedication regimen; continue same and monitor with addition of scheduled low dose opiate.     Acute encephalopathy/delirium: multifactorial, concern for preexisting MCI possible; improving  - avoid deliriogenic meds as able   - attention to sleep hygiene, consider melatonin  - continue corticosteroid taper  - do not resume hydroxyzine at discharge.  - pending goals of care, prognosis, consider neuropsych eval post discharge     Support: daughter La Nena significant support to patient (she also needs support from  palliative and other teams) and two sons involved.  Daughter notes ex= was not welcomed by the patient to be involved in the past and she requests that the patients' wishes be followed.  Familial discord chronically and difficult relationships between La Nena and her brothers, and mother not wanting to have further interaction w ex-.  La Nena requests having SW assist with communication with brothers in arranging care conferences.     Goals of Care: currently life prolonging and full code.  Olga notes that at this time she would be willing to endure CPR and mechanical ventilation in hopes of recovery, but does not want a tracheostomy or to be on mechanical ventilation for prolonged period.    She is working through what she would hope for if her body is not able to get strong enough for more chemo, and what she would want to do if time were short.  She hopes to recover enough to be with her daughter/family and would like to recover to prior QOL but endorses worry she may not be able to.  Palliative recommends having care conference in the next few days; our team is unable to do a care conference at 3pm due to one already scheduled.              Assessments          Ogla Bailey is a 75 year old female with recent diagnosis of glioblastoma s/p resection 2/21 with prior depression/anxiety, HTN, asthma, GERD who was admitted to [facility] 3/26 and found to have PJP pneumonia, RANJIT, bilateral DVT, R retroperitoneal bleed and a month-long stay.  She then transferred from [facility] to [facility] 4/26 in hopes of receiving possible inpatient chemoradiation with temovar and radiation.  Her functional status has declined precipitously during the lengthy hospitalization.    Today, the patient was seen for:  Dyspnea  Anxiety  Support (pt and daughter)  Goals of care    Prognosis, Goals, or Advance Care Planning was addressed today with: No.  Mood, coping, and/or meaning in the context of serious  "illness were addressed today: Yes.               Interval History:     Chart review/discussion with unit or clinical team members:      Had rapid response this morning d/t tachypnea, tachycardia and SIRS/Sepsis trigger.  She had several doses of lorazepam, oxycodone and hydroxyzine.   Has had 1-2 BMs daily since admission  Reviewed oncology notes and could consider possible palliative brain radiation if functional status does not improve adequately for chemo/other    Per patient or family/caregivers today:  Dyspnea severe, notes minimal pain.  Able to eat some soft/full liquid breakfast (thickened)  Hard to breathe, notes anxiety is severe, \"My mind is just running.\"    Key Palliative Symptoms:  # Pain severity the last 12 hours: low  # Dyspnea severity the last 12 hours: severe  # Nausea severity the last 12 hours: low  # Anxiety severity the last 12 hours: severe    Patient is on opioids: assessed and bowels ok/no needed changes to plan of care today.           Review of Systems:     Besides above, an additional 3 system ROS was reviewed and is unremarkable          Medications:     I have reviewed this patient's medication profile and medications during this hospitalization.    Noted meds:     APAP 975 q8H  Doxepin prn sleep  Oxycodone 2.5-5mg PO q4H prn pain, dyspnea: 1 dose in 24 hrs. ([id number] 4/29)  Lorazepam: 0.5mg IV TID PRN anxiety: 1.5mg in 24 hrs  Buspirone 30mg BID  Fluoxetine 80mg                 Physical Exam:   Vitals were reviewed  Alert woman, tachypneic (35-40 breaths/minute)  Sats drop into low 80%'s after stopping Bipap and transition to mask O2.  Lungs scattered crackles noted bilaterally.  Heart S1S2 tachy (100s)  abd soft, nontender  Anxious, answers questions, mild tremor.  extr tapping foot anxiously.             Data Reviewed:     Reviewed recent pertinent imaging, comments:   Reviewed portable cxr- no significant change vs yesterday    Reviewed recent labs, comments:   Na 135, K 3.5, " creatinine 0.43, WBC 13.8, hgb 9.2, platelet 233      Belgica Knight MD  Fellow, Hospice and Palliative Medicine  [phone] pager  This visit was staffed with Dr Ashley Alfredo    Patient seen and evaluated with Dr. Knight.    Agree with assessment and recommendations.      Ashley Alfredo  Palliative Care   Pager [phone]

## 2021-04-29 NOTE — PROVIDER NOTIFICATION
Time of notification: 12:50 PM  Provider notified: JANETTE Ram *[id number]    Patient status: Acute increase in anxiety ~30min ago and has persisted. Attempted to place patient on BiPAP but RR remained in 40's and pt reported feeling overwhelmed with the mask on her face and the 4 providers (3 palliative, 1 pulm) asking her questions with the BiPAP on. BiPAP mask removed, pt requiring 8-10L mask to maintain sats >90%. Palliative adjusted IV ativan to Q4h, so dose available at this time and given. On assessment pt's lungs with more fine crackles then this morning. Since 0700 has received 100cc keppra, 250cc phos replacement, 570 ml bactrim, and TPN @35cc/hr. No UOP since 0700.     Orders received: 40mg IV lasix. Continue to monitor closely.

## 2021-04-29 NOTE — PROGRESS NOTES
Rapid Response Team Note    Assessment   In assessment a rapid response was called on Olga Bailey due to SIRS/Sepsis trigger. This presentation is likely due to anxiety attack increasing respiratory drive, HR and worsened by PJP PNA, glioblastoma.     Plan   -  Continue current plan of care. Hold off on further IVF given high volume with IV Bactrim, TPN, hypervolemia. No indication to broaden abx at this time  -  Suggest scheduled anti-anxiety medication  -  The Internal Medicine primary team was able to be reached and they are in agreement with the above plan.  -  Disposition: The patient will remain on the current unit. We will continue to monitor this patient closely.  -  Reassessment and plan follow-up will be performed by the primary team      Wendy Hunter PA-C  [facility] RRT [facility] Job Code Contact #[phone]    Hospital Course   Brief Summary of events leading to rapid response:   Patient continues to have tachypnea (improved over the past 24 hours) and tachycardia. She currently reports feeling anxious. She denies increased dyspnea or chest pain. Per daughter, she has been having increased RR throughout the am in the setting of waxing and waning anxiety. No new AMS. No fevers    Admission Diagnosis:   Hypoxia [R09.02]     Physical Exam   Temp: 97.7  F (36.5  C) Temp  Min: 97.7  F (36.5  C)  Max: 98.4  F (36.9  C)  Resp: 24 Resp  Min: 20  Max: 42  SpO2: 93 % SpO2  Min: 81 %  Max: 98 %  Pulse: 103 Pulse  Min: 91  Max: 120    No data recorded  BP: (!) 161/94 Systolic (24hrs), Av , Min:123 , Max:161   Diastolic (24hrs), Av, Min:85, Max:97     I/Os: I/O last 3 completed shifts:  In: 2.72 [P.O.:290; I.V.:690]  Out: 2300 [Urine:2300]     Exam:   General: A&O x3, appears anxious  Mental Status: baseline mental status.  CV: Tachycardic to 100s, no murmur noted  Resp: RR 30s. Shallow breathing on NC. O2 sats 95%  Abdomen: Soft, non-tender with active bowel sounds. No guarding or rebound    Peripheral: Trace BLE peripheral edema, distal pulses palpable     Significant Results and Procedures   Lactic Acid:   Recent Labs   Lab Test 04/29/21  0742 04/28/21  1405 04/28/21  0831 04/27/21  1935 04/27/21  1711 04/26/21  1605   LACT  --  2.1* 3.0* 1.8  --   --    LACTS 2.2*  --   --   --  2.9* 1.5     CBC:   Recent Labs   Lab Test 04/29/21  0545 04/28/21  0535 04/27/21  0548   WBC 13.8* 13.7* 13.2*   HGB 9.2* 9.3* 8.7*   HCT 29.5* 29.1* 26.7*    219 216        Sepsis Evaluation   The patient is known to have an infection.  Olga Bailey meets SIRS criteria but does NOT have a lactate >2 or other evidence of acute organ damage.  These vital sign, lab and physical exam findings are consistent with SEPSIS.    Sepsis Time-Zero: Previously diagnosed    Anti-infectives (From now, onward)    Start     Dose/Rate Route Frequency Ordered Stop    04/29/21 0800  sulfamethoxazole-trimethoprim (BACTRIM) 400-80 MG per tablet 1 tablet      1 tablet Oral DAILY 04/29/21 0748          Current antibiotic coverage is appropriate for source of infection.

## 2021-04-29 NOTE — PLAN OF CARE
Neuro: Slept most of the night, easily aroused while sleeping. Able to sleep between cares. Oriented, forgetful.  Able to use call light and ask for help. Cares clustered to enhance sleep.   Cardiac: Tachycardia around MN, SR since 0400.        Respiratory: Sating adequately on BiPAP 45% FiO2, tachypnea  GI/: Adequate urine output via Purewick. No BM  Diet/appetite: Tolerating diet. TPN lipids  Activity:  Turn Q 2 hours. Bedrest  Pain: At acceptable level on current regimen.   Skin: No new deficits noted.  LDA's:  PICC  Purewick.   Plan: Continue with POC. Notify primary team with changes.

## 2021-04-29 NOTE — PROGRESS NOTES
04/29/21 1215   Quick Adds   Type of Visit Initial Occupational Therapy Evaluation   Living Environment   People in home alone   Current Living Arrangements house   Home Accessibility stairs to enter home   Number of Stairs, Main Entrance 3   Number of Stairs, Within Home, Primary   (14 to 2nd floor but can stay on main floor)   Transportation Anticipated health plan transportation   Living Environment Comments All information gathered from chair, per RN had been having a lot of anxiety and now a little groggy from medication.     Self-Care   Usual Activity Tolerance good   Current Activity Tolerance poor   Equipment Currently Used at Home cane, quad   Activity/Exercise/Self-Care Comment Per notes was Mod I with a cane, lived with her dtr after recent rehab stay   Disability/Function   Change in Functional Status Since Onset of Current Illness/Injury yes   General Information   Onset of Illness/Injury or Date of Surgery 04/26/21   Referring Physician Dr Sada JEAN   Patient/Family Therapy Goal Statement (OT) dtr would like to bring her home with her as able   Additional Occupational Profile Info/Pertinent History of Current Problem 75 year old female with recent diagnosis of glioblastoma s/p resection 2/21 with prior depression/anxiety, HTN, asthma, GERD who transferred from [facility] to [facility] 4/26 in order to receive possible inpatient chemoradiation with temovar and raditiona; has concurrent issues of multiple DVTs and retroperitoneal hemorrhage s/p IVC filter placement, and acute hypoxic respiratory failure and conern for possible PJP pneumonia.   Existing Precautions/Restrictions fall;oxygen therapy device and L/min;NPO   Limitations/Impairments safety/cognitive   General Observations and Info activity  up w A and delirium managemeent   Cognitive Status Examination   Orientation Status other (see comments)   Affect/Mental Status (Cognitive) low arousal/lethargic;flat/blunted affect;anxious   Follows  Commands follows one-step commands   Cognitive Status Comments slow to respond, reports feeling groggy, had been very anxious prior to session   Visual Perception   Visual Impairment/Limitations corrective lenses full-time   Impact of Vision Impairment on Function (Vision) per old note: significantly reduced peripheral vision bilaterally. intact tracking in all visual fields. slowed saccades when looking down. Near point of convergence of ~16 inches from nose 4/9/21 from ARU   Sensory   Sensory Comments intact   Pain Assessment   Patient Currently in Pain No   Integumentary/Edema   Integumentary/Edema no deficits were identifed   Posture   Posture Comments in supine only, planned for up to chair but unable to tolerate   Range of Motion Comprehensive   Comment, General Range of Motion baseline shoulder limitations, to 90 degrees flexion only   Strength Comprehensive (MMT)   Comment, General Manual Muscle Testing (MMT) Assessment NT due to anxiety and poor alertness   Coordination   Fine Motor Coordination BNL   Bed Mobility   Comment (Bed Mobility) max A x2, minimal participation   Transfers   Transfer Comments planned D transfer but was unable to tolerate   Balance   Balance Comments deficits due to alerntess   Activities of Daily Living   BADL Assessment/Intervention toileting;lower body dressing   Lower Body Dressing Assessment/Training   Dawson Level (Lower Body Dressing) dependent (less than 25% patient effort)   Grooming Assessment/Training   Dawson Level (Grooming) dependent (less than 25% patient effort)   Instrumental Activities of Daily Living (IADL)   Previous Responsibilities meal prep;housekeeping;laundry;shopping;yardwork;medication management;finances;driving;work   IADL Comments per prior note   Clinical Impression   Criteria for Skilled Therapeutic Interventions Met (OT) yes   OT Diagnosis deconditioning   OT Problem List-Impairments impacting ADL activity tolerance  impaired;balance;cognition;mobility;range of motion (ROM);sensation;post-surgical precautions;inability to direct their own care;strength;postural control   Assessment of Occupational Performance 5 or more Performance Deficits   Identified Performance Deficits all ADLs and IADLs are affected   Planned Therapy Interventions (OT) ADL retraining;IADL retraining;balance training;fine motor coordination training;cognition;transfer training;home program guidelines;progressive activity/exercise;motor coordination training;visual perception;strengthening;ROM   Clinical Decision Making Complexity (OT) moderate complexity   Therapy Frequency (OT) 5x/week   Predicted Duration of Therapy 3 weeks   Risk & Benefits of therapy have been explained evaluation/treatment results reviewed;care plan/treatment goals reviewed;risks/benefits reviewed;current/potential barriers reviewed;participants voiced agreement with care plan;patient;participants included   OT Discharge Planning    OT Discharge Recommendation (DC Rec) Transitional Care Facility   OT Rationale for DC Rec Pt well below her functional baseline, limited tolerance for therapy at this time.    OT Brief overview of current status  Limited session able due to anxiety, then lethargy following anxiety medication.    Total Evaluation Time (Minutes)   Total Evaluation Time (Minutes) 5   Skin WDL   Skin WDL X   Coping Strategies   Trust Relationship/Rapport care explained;choices provided

## 2021-04-29 NOTE — PLAN OF CARE
NEURO: Waxes and wanes, currently A&O x4  VITALS: Blood pressure (!) 152/96, pulse 110, temperature 98.4  F (36.9  C), temperature source Oral, resp. rate 24, weight 73.8 kg (162 lb 11.2 oz), SpO2 96 %.    PAIN: Denies, given PRN oxy x1 for SOB  CARDIAC: SR/ST  RESPIRATORY: 3-4L NC or Oxymask  GI: Bowel sounds active, no BM this shift, PO intake initially poor, eating dinner now  : Adequate UOP via purewick  LDA: PICC, purewick  IV: TPN, Lipids, keppra running now  ACTIVITY: Repositioned with Ax2 q2h  GOALS: Rest this evening and better anxiety control

## 2021-04-29 NOTE — PROGRESS NOTES
Hematology / Oncology  Daily Progress Note   Date of Service: 04/29/2021  Patient: Olga Bailey  MRN: [id number]  Admission Date: 4/26/2021  Hospital Day # 3  Cancer Diagnosis: Glioblastoma  Primary Outpatient Oncologist: Dr. Baker  Current Treatment Plan: S/p tumor resection 2/23/21. Plan is to undergo radiation and start Temozolomide, not yet initiated.     Assessment & Plan:   Olga Bailey is a 75 year old year old female with history of left frontoparietal glioblastoma s/p tumor resection 2/23/21. The plan was to undergo adjuvant chemoradiation, which has been held for prolonged admission at [facility] (3/26-4/26/21) with pneumonia and multiple DVTs, complicated by retroperitoneal hemorrhage and shock s/p IVC filter placement. She was transferred to [facility] on 4/26/21 for further Oncology and Radiation Oncology care.      Recommendations:   - Would benefit from a care conference, will work with care coordinator to try to plan on for tomorrow with the various medicine teams as well as patient's family  - Patient will need to have a significant improvement in her performance status prior to initiation of chemotherapy with Temodar   - We do not foresee Olga being able to tolerate even Temodar any time soon in the setting of her poor performance status   - If she were to have a significant improvement in her performance status we could consider palliative Temodar in the future  - Did discuss with Dr Baker that even if patient does not have a significant improvement in performance status, she may be able to proceed with palliative radiation with a shortened course   - Rad Onc will see her today to assess her performance status and evaluate if she will be able to do radiation   - Plan for discussion at brain tumor board on 5/3     # Glioblastoma Multiforme  Follows with Dr. Baker.   For further detail of oncologic history, please see below.  - Diagnosed with a left frontoparietal brain  glioblastoma s/p resection 2/23/2021. Seen by Dr. Baker on 3/23, recommended temozolomide and radiation however treatment has not been initiated yet due to prolonged admission at [facility] with pneumonia, B/l LE DVTs, and RP hemorrhage.    - Transferred to [facility] where she could receive radiation treatment as appropriate.   - Radiation Oncology consulted   - Repeat brain MRI (4/26) shows postsurgical changes of left parietal craniotomy and mass resection. Peripheral T1 hyperintensity of the resection cavity with focal nodule of the superior resection cavity measuring 1.0 cm, which may represent disease recurrence. Stable 1.1 cm T1 hyperintense lesion along the right occipital convexity and in the anterior right lateral ventricle.     - If patient were to go to a facility, she would likely need to go to a facility that would provide transport to Radiation treatments    - Patient will need to have a significant improvement in her performance status prior to initiation of chemotherapy with Temodar   - We do not foresee Olga being able to tolerate even Temodar any time soon in the setting of her poor performance status   - If she were to have a significant improvement in her performance status we could consider palliative Temodar in the future  - If patient's performance status greatly improves, we could consider starting temozolomide depending on hospital course, the recommended dose would be 75mg/m2 (140mg) daily in the evening until completion of radiation.   - Currently patient's performance status is too low to consider chemotherapy   - But If she discharges to a facility, will need to find one that accepts temozolomide. Appreciate SWS assistance. Of note, temozolomide is delivered from a [facility], we do not have this medication inpatient.     - Did discuss with Dr Baker that even if patient does not have a significant improvement in performance status, she may be able to proceed with  palliative radiation with a shortened course   - Rad Onc will see her today to assess her performance status and evaluate if she will be able to do radiation   - Plan for discussion at brain tumor board on 5/3    # Bilateral LE DVTs  # Retroperitoneal bleed  B/l LE DVTs noted on 3/29/21.   - Started therapeutic anticoagulation. C/b right retroperitoneal bleed 4/14/21 and subsequently developed acute hypoxic respiratory failure.   - IVC filter placed with IR on 4/16/21.   - Bilateral LE ultrasound showed improvement of previously diagnosed DVTs, though not resolution   - keep IVC filter   - anticoagulation per primary team, but ok to continue ppx lovenox    # SOB  # Dyspnea  # Recent PJP pneumonia  # Anxiety  Olga continues to have SOB and tachypnea. Breathing frequently. She continues to struggle with anxiety at night and starts to breath very quickly which tires her out and requires her to be on BiPap. Slept well after she had difficulty with anxiety.    - Concerned that there is an element of anxiety causing her to become tachypneic   - Recommend psych consult   - Health psych to see her as well  - May benefit from Seroquel at night to help with sleep and anxiety  - ECHO showed hyperkinetic with EF >70%  - CXR has stable mixed opacities bilaterally    # Malnutrition  Regarding patient's nutrition status, currently on TPN  - Pending the results of the swallow study, we could consider a PEG tube if patient wants to continue to pursue treatment vs TPN   - This ultimately would warrant a GOC conversation as her goals of treatment would dictate whether we continue TPN or transition to PEG tube (this is also dependent on her ability to restore her own swallow function)  - If there are signs that she could continue to have restorative function to her swallowing, would continue TPN in the setting that she would be able to eat by mouth eventually       We will continue to follow this patient. Please do not hesitate to  page with any questions or concerns.     Patient was seen and plan of care was discussed with attending physician Dr. Fair.     Ruth Mccloud PA-C ([place])  Hematology/Oncology   Pager: [phone]    Oncologic History:  - 2/2021 PRESENTATION: Progressive aphasia.   - 2/19/2021 MR brain imaging with 3.3 x 2.8 x 2.8 cm enhancing mass in left frontal-parietal region. A second contrast enhancing lesion (0.5 x 1.0 x 1.0 cm) in right occipital lobe which is dural based and largely stable in size since 9/2011; likely representing a meningioma.  - 2/23/2021 SURGERY: Gross total resection by Dr. Cummings  PATHOLOGY: Glioblastoma; IDH1-R132H wild-type/ IDH 1 and 2 wildtype, MGMT promoter methylated. Not BRAF mutated.   - 3/23/2021 Established care with Dr. Baker. Plan was to initiate chemotherapy with temozolomide 75mg/m2 (140mg) daily in the evening until completion of radiation. Temozolomide held for admission to [facility]. Adjuvant radiation was planned at [facility], held for dyspnea and subsequently admitted to [facility] with acute hypoxic respiratory failure and pneumonia.   __________________________________________________________________    Subjective & Interval History:    No acute events noted overnight.  Olga continues to have SOB and tachypnea. Breathing frequently. She continues to struggle with anxiety at night and starts to breath very quickly which tires her out and requires her to be on BiPap. Slept well after she had difficulty with anxiety.      Encourage to get up a chair today and utilize spirometer.   Appetite minimal. Energy low.  Denies fever, SOB, cough, abdominal pain, diarrhea, constipation, nausea, vomiting, dysuria, hematuria, numbness, tingling, swelling    Called and updated daughter, La Nena today    Complete and Comprehensive review of systems review and negative other than noted here or in the HPI.     Physical Exam:    Blood pressure (!) 143/80, pulse 102, temperature 98.2  F (36.8  C),  temperature source Axillary, resp. rate (!) 40, weight 71 kg (156 lb 8.4 oz), SpO2 95 %.    Constitutional: Pleasant female lying in bed. More awake today than yesterday.Conversational. Non- toxic appearing. No acute distress.   HEENT: Normocephalic, atraumatic. Sclerae anicteric. EOM intact.   Respiratory: Tachypneic, breathing very quickly and increased work of breathing. Lungs with crackles noted at bases bilaterally  Cardiovascular: Tachycardic. S1 S2. No murmurs noted. Minimal Peripheral edema  Skin: Skin is clean, dry, intact. No jaundice appreciated.   Musculoskeletal/ Extremities: No redness, warmth, or swelling of the joints appreciated. Distal pulses palpable. Nailbeds pink and without cyanosis or clubbing.   Neurologic: Alert and oriented. Speech normal. Grossly nonfocal. Memory and thought process preserved.   Neuropsychiatric: Calm, affect congruent to situation. Appropriate mood and affect. Good judgment and insight. No visual/auditory hallucinations.       Labs & Studies: I personally reviewed the following studies:  ROUTINE LABS (Last four results):  CMP  Recent Labs   Lab 04/29/21  0742 04/29/21  0545 04/28/21  1601 04/28/21  0535 04/27/21  1711 04/27/21  0548 04/26/21  1602   NA  --  135  --  134  --  132* 131*   POTASSIUM  --  3.5 5.3 3.8 3.7 3.6 3.8   CHLORIDE  --  101  --  102  --  100 100   CO2  --  30  --  26  --  25 26   ANIONGAP  --  4  --  6  --  8 6   GLC  --  110*  --  155*  --  166* 71   BUN  --  29  --  16  --  16 19   CR  --  0.43*  --  0.40*  --  0.36* 0.42*   GFRESTIMATED  --  >90  --  >90  --  >90 >90   GFRESTBLACK  --  >90  --  >90  --  >90 >90   ESTIVEN  --  8.6  --  8.7  --  8.5 8.8   MAG  --  2.4* 2.8* 2.3 2.2 2.1 2.1   PHOS 2.6 2.7 6.9* 3.2 2.3* 3.8 2.1*   PROTTOTAL  --  5.3*  --  5.4*  --  5.2* 5.5*   ALBUMIN  --  2.6*  --  2.5*  --  2.3* 2.4*   BILITOTAL  --  0.3  --  0.3  --  0.4 0.4   ALKPHOS  --  116  --  116  --  126 123   AST  --  34  --  35  --  37 36   ALT  --  70*  --   82*  --  92* 107*     CBC  Recent Labs   Lab 04/29/21  0545 04/28/21  0535 04/27/21  0548 04/26/21  1602   WBC 13.8* 13.7* 13.2* 15.8*   RBC 3.12* 3.20* 2.99* 3.23*   HGB 9.2* 9.3* 8.7* 9.2*   HCT 29.5* 29.1* 26.7* 28.5*   MCV 95 91 89 88   MCH 29.5 29.1 29.1 28.5   MCHC 31.2* 32.0 32.6 32.3   RDW 18.6* 17.8* 17.2* 16.7*    219 216 220     INR  Recent Labs   Lab 04/27/21  0548   INR 1.02       Medications list for reference:  Current Facility-Administered Medications   Medication     acetaminophen (TYLENOL) tablet 975 mg     acyclovir (ZOVIRAX) 5 % ointment     albuterol (PROAIR HFA/PROVENTIL HFA/VENTOLIN HFA) 108 (90 Base) MCG/ACT inhaler 2 puff     amLODIPine (NORVASC) tablet 10 mg     bisacodyl (DULCOLAX) Suppository 10 mg     busPIRone (BUSPAR) tablet 30 mg     carboxymethylcellulose PF (REFRESH PLUS) 0.5 % ophthalmic solution 1 drop     dextrose 10% infusion     glucose gel 15-30 g    Or     dextrose 50 % injection 25-50 mL    Or     glucagon injection 1 mg     doxepin (SINEquan) 10 MG/ML (HIGH CONC) solution 3-6 mg     enoxaparin ANTICOAGULANT (LOVENOX) injection 40 mg     FLUoxetine (PROzac) capsule 80 mg     insulin aspart (NovoLOG) injection (RAPID ACTING)     insulin aspart (NovoLOG) injection (RAPID ACTING)     lactulose (CHRONULAC) solution 3 g     levETIRAcetam (KEPPRA) 250 mg in sodium chloride 0.9 % 100 mL intermittent infusion     lidocaine (LMX4) cream     lidocaine 1 % 0.1-1 mL     linaclotide (LINZESS) capsule 290 mcg     lipids (INTRALIPID) 20 % infusion 250 mL     LORazepam (ATIVAN) injection 0.5 mg    Or     LORazepam (ATIVAN) tablet 0.5 mg     melatonin tablet 6 mg     mesalamine (CANASA) Suppository 1,000 mg     naloxone (NARCAN) injection 0.2 mg    Or     naloxone (NARCAN) injection 0.4 mg    Or     naloxone (NARCAN) injection 0.2 mg    Or     naloxone (NARCAN) injection 0.4 mg     ondansetron (ZOFRAN) injection 4 mg     oxyCODONE IR (ROXICODONE) half-tab 2.5 mg     oxyCODONE IR  (ROXICODONE) half-tab 2.5-5 mg     pantoprazole (PROTONIX) IV push injection 40 mg     parenteral nutrition - ADULT compounded formula     polyethylene glycol (MIRALAX) Packet 17 g     predniSONE (DELTASONE) tablet 50 mg    Followed by     [START ON 5/1/2021] predniSONE (DELTASONE) tablet 40 mg    Followed by     [START ON 5/3/2021] predniSONE (DELTASONE) tablet 30 mg    Followed by     [START ON 5/4/2021] predniSONE (DELTASONE) tablet 20 mg    Followed by     [START ON 5/5/2021] predniSONE (DELTASONE) tablet 10 mg     prochlorperazine (COMPAZINE) injection 5 mg     sennosides (SENOKOT) tablet 8.6 mg     simethicone (MYLICON) suspension 40 mg     sodium chloride (PF) 0.9% PF flush 10 mL     sodium chloride (PF) 0.9% PF flush 10 mL     sodium chloride (PF) 0.9% PF flush 3 mL     sodium chloride (PF) 0.9% PF flush 3 mL     sodium phosphate (NaPHOS) 9 mMol in 250 mL D5W (pre-mix) infusion     [START ON 4/30/2021] sulfamethoxazole-trimethoprim (BACTRIM) 80 mg in D5W 115 mL intermittent infusion
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
## 2021-04-30 ENCOUNTER — APPOINTMENT (OUTPATIENT)
Dept: SPEECH THERAPY | Facility: CLINIC | Age: 76
DRG: 177 | End: 2021-04-30
Attending: STUDENT IN AN ORGANIZED HEALTH CARE EDUCATION/TRAINING PROGRAM
Payer: MEDICARE

## 2021-04-30 ENCOUNTER — APPOINTMENT (OUTPATIENT)
Dept: OCCUPATIONAL THERAPY | Facility: CLINIC | Age: 76
DRG: 177 | End: 2021-04-30
Attending: STUDENT IN AN ORGANIZED HEALTH CARE EDUCATION/TRAINING PROGRAM
Payer: MEDICARE

## 2021-04-30 LAB
ALBUMIN SERPL-MCNC: 2.4 G/DL (ref 3.4–5)
ALP SERPL-CCNC: 117 U/L (ref 40–150)
ALT SERPL W P-5'-P-CCNC: 58 U/L (ref 0–50)
ANION GAP SERPL CALCULATED.3IONS-SCNC: 6 MMOL/L (ref 3–14)
AST SERPL W P-5'-P-CCNC: 37 U/L (ref 0–45)
BILIRUB SERPL-MCNC: 0.4 MG/DL (ref 0.2–1.3)
BUN SERPL-MCNC: 27 MG/DL (ref 7–30)
CALCIUM SERPL-MCNC: 8.5 MG/DL (ref 8.5–10.1)
CHLORIDE SERPL-SCNC: 102 MMOL/L (ref 94–109)
CO2 SERPL-SCNC: 29 MMOL/L (ref 20–32)
CREAT SERPL-MCNC: 0.39 MG/DL (ref 0.52–1.04)
ERYTHROCYTE [DISTWIDTH] IN BLOOD BY AUTOMATED COUNT: 19 % (ref 10–15)
GFR SERPL CREATININE-BSD FRML MDRD: >90 ML/MIN/{1.73_M2}
GLUCOSE BLDC GLUCOMTR-MCNC: 119 MG/DL (ref 70–99)
GLUCOSE BLDC GLUCOMTR-MCNC: 122 MG/DL (ref 70–99)
GLUCOSE BLDC GLUCOMTR-MCNC: 145 MG/DL (ref 70–99)
GLUCOSE BLDC GLUCOMTR-MCNC: 201 MG/DL (ref 70–99)
GLUCOSE BLDC GLUCOMTR-MCNC: 225 MG/DL (ref 70–99)
GLUCOSE SERPL-MCNC: 120 MG/DL (ref 70–99)
HCT VFR BLD AUTO: 27.3 % (ref 35–47)
HGB BLD-MCNC: 8.7 G/DL (ref 11.7–15.7)
MAGNESIUM SERPL-MCNC: 2.1 MG/DL (ref 1.6–2.3)
MCH RBC QN AUTO: 30.1 PG (ref 26.5–33)
MCHC RBC AUTO-ENTMCNC: 31.9 G/DL (ref 31.5–36.5)
MCV RBC AUTO: 95 FL (ref 78–100)
PHOSPHATE SERPL-MCNC: 2.7 MG/DL (ref 2.5–4.5)
PLATELET # BLD AUTO: 208 10E9/L (ref 150–450)
POTASSIUM SERPL-SCNC: 3.5 MMOL/L (ref 3.4–5.3)
PROT SERPL-MCNC: 5 G/DL (ref 6.8–8.8)
RBC # BLD AUTO: 2.89 10E12/L (ref 3.8–5.2)
SODIUM SERPL-SCNC: 137 MMOL/L (ref 133–144)
WBC # BLD AUTO: 11.5 10E9/L (ref 4–11)

## 2021-04-30 PROCEDURE — 258N000003 HC RX IP 258 OP 636: Performed by: STUDENT IN AN ORGANIZED HEALTH CARE EDUCATION/TRAINING PROGRAM

## 2021-04-30 PROCEDURE — 36415 COLL VENOUS BLD VENIPUNCTURE: CPT | Performed by: STUDENT IN AN ORGANIZED HEALTH CARE EDUCATION/TRAINING PROGRAM

## 2021-04-30 PROCEDURE — 99232 SBSQ HOSP IP/OBS MODERATE 35: CPT | Performed by: INTERNAL MEDICINE

## 2021-04-30 PROCEDURE — 250N000013 HC RX MED GY IP 250 OP 250 PS 637: Performed by: STUDENT IN AN ORGANIZED HEALTH CARE EDUCATION/TRAINING PROGRAM

## 2021-04-30 PROCEDURE — 99233 SBSQ HOSP IP/OBS HIGH 50: CPT | Mod: GC | Performed by: HOSPITALIST

## 2021-04-30 PROCEDURE — 250N000012 HC RX MED GY IP 250 OP 636 PS 637: Performed by: STUDENT IN AN ORGANIZED HEALTH CARE EDUCATION/TRAINING PROGRAM

## 2021-04-30 PROCEDURE — 83735 ASSAY OF MAGNESIUM: CPT | Performed by: STUDENT IN AN ORGANIZED HEALTH CARE EDUCATION/TRAINING PROGRAM

## 2021-04-30 PROCEDURE — 250N000011 HC RX IP 250 OP 636: Performed by: STUDENT IN AN ORGANIZED HEALTH CARE EDUCATION/TRAINING PROGRAM

## 2021-04-30 PROCEDURE — 999N000157 HC STATISTIC RCP TIME EA 10 MIN

## 2021-04-30 PROCEDURE — 99232 SBSQ HOSP IP/OBS MODERATE 35: CPT | Mod: GC | Performed by: INTERNAL MEDICINE

## 2021-04-30 PROCEDURE — 80053 COMPREHEN METABOLIC PANEL: CPT | Performed by: STUDENT IN AN ORGANIZED HEALTH CARE EDUCATION/TRAINING PROGRAM

## 2021-04-30 PROCEDURE — 999N001017 HC STATISTIC GLUCOSE BY METER IP

## 2021-04-30 PROCEDURE — 120N000005 HC R&B MS OVERFLOW UMMC

## 2021-04-30 PROCEDURE — 97535 SELF CARE MNGMENT TRAINING: CPT | Mod: GO | Performed by: OCCUPATIONAL THERAPIST

## 2021-04-30 PROCEDURE — 250N000011 HC RX IP 250 OP 636: Performed by: INTERNAL MEDICINE

## 2021-04-30 PROCEDURE — 250N000009 HC RX 250: Performed by: HOSPITALIST

## 2021-04-30 PROCEDURE — 92526 ORAL FUNCTION THERAPY: CPT | Mod: GN

## 2021-04-30 PROCEDURE — 250N000013 HC RX MED GY IP 250 OP 250 PS 637: Performed by: INTERNAL MEDICINE

## 2021-04-30 PROCEDURE — C9113 INJ PANTOPRAZOLE SODIUM, VIA: HCPCS

## 2021-04-30 PROCEDURE — 99233 SBSQ HOSP IP/OBS HIGH 50: CPT | Performed by: INTERNAL MEDICINE

## 2021-04-30 PROCEDURE — 258N000003 HC RX IP 258 OP 636: Performed by: HOSPITALIST

## 2021-04-30 PROCEDURE — 85027 COMPLETE CBC AUTOMATED: CPT | Performed by: STUDENT IN AN ORGANIZED HEALTH CARE EDUCATION/TRAINING PROGRAM

## 2021-04-30 PROCEDURE — 250N000011 HC RX IP 250 OP 636

## 2021-04-30 PROCEDURE — 84100 ASSAY OF PHOSPHORUS: CPT | Performed by: STUDENT IN AN ORGANIZED HEALTH CARE EDUCATION/TRAINING PROGRAM

## 2021-04-30 RX ORDER — LANOLIN ALCOHOL/MO/W.PET/CERES
6 CREAM (GRAM) TOPICAL
Status: DISCONTINUED | OUTPATIENT
Start: 2021-04-30 | End: 2021-05-15 | Stop reason: HOSPADM

## 2021-04-30 RX ORDER — DOXEPIN HYDROCHLORIDE 10 MG/ML
3-6 SOLUTION ORAL AT BEDTIME
Status: DISCONTINUED | OUTPATIENT
Start: 2021-04-30 | End: 2021-05-01

## 2021-04-30 RX ORDER — FUROSEMIDE 10 MG/ML
20 INJECTION INTRAMUSCULAR; INTRAVENOUS ONCE
Status: COMPLETED | OUTPATIENT
Start: 2021-04-30 | End: 2021-04-30

## 2021-04-30 RX ADMIN — Medication 2.5 MG: at 19:38

## 2021-04-30 RX ADMIN — QUETIAPINE 25 MG: 25 TABLET ORAL at 07:48

## 2021-04-30 RX ADMIN — ACYCLOVIR: 50 OINTMENT TOPICAL at 23:43

## 2021-04-30 RX ADMIN — INSULIN ASPART 1 UNITS: 100 INJECTION, SOLUTION INTRAVENOUS; SUBCUTANEOUS at 18:13

## 2021-04-30 RX ADMIN — ACETAMINOPHEN 975 MG: 325 TABLET, FILM COATED ORAL at 23:34

## 2021-04-30 RX ADMIN — SULFAMETHOXAZOLE AND TRIMETHOPRIM 80 MG: 80; 16 INJECTION, SOLUTION, CONCENTRATE INTRAVENOUS at 23:35

## 2021-04-30 RX ADMIN — Medication 2.5 MG: at 14:21

## 2021-04-30 RX ADMIN — ACYCLOVIR: 50 OINTMENT TOPICAL at 14:32

## 2021-04-30 RX ADMIN — LEVETIRACETAM 250 MG: 100 INJECTION, SOLUTION, CONCENTRATE INTRAVENOUS at 09:31

## 2021-04-30 RX ADMIN — LACTULOSE 3 G: 20 SOLUTION ORAL at 18:49

## 2021-04-30 RX ADMIN — ACYCLOVIR: 50 OINTMENT TOPICAL at 18:49

## 2021-04-30 RX ADMIN — LEVETIRACETAM 250 MG: 100 INJECTION, SOLUTION, CONCENTRATE INTRAVENOUS at 23:36

## 2021-04-30 RX ADMIN — BUSPIRONE HYDROCHLORIDE 30 MG: 15 TABLET ORAL at 07:47

## 2021-04-30 RX ADMIN — LORAZEPAM 0.5 MG: 2 INJECTION INTRAMUSCULAR; INTRAVENOUS at 15:56

## 2021-04-30 RX ADMIN — AMLODIPINE BESYLATE 10 MG: 10 TABLET ORAL at 07:47

## 2021-04-30 RX ADMIN — QUETIAPINE 25 MG: 25 TABLET ORAL at 19:38

## 2021-04-30 RX ADMIN — ACETAMINOPHEN 975 MG: 325 TABLET, FILM COATED ORAL at 07:48

## 2021-04-30 RX ADMIN — PANTOPRAZOLE SODIUM 40 MG: 40 INJECTION, POWDER, FOR SOLUTION INTRAVENOUS at 18:48

## 2021-04-30 RX ADMIN — I.V. FAT EMULSION 250 ML: 20 EMULSION INTRAVENOUS at 20:04

## 2021-04-30 RX ADMIN — ACYCLOVIR: 50 OINTMENT TOPICAL at 08:10

## 2021-04-30 RX ADMIN — Medication: at 20:01

## 2021-04-30 RX ADMIN — SULFAMETHOXAZOLE AND TRIMETHOPRIM 80 MG: 80; 16 INJECTION, SOLUTION, CONCENTRATE INTRAVENOUS at 09:52

## 2021-04-30 RX ADMIN — LORAZEPAM 0.5 MG: 0.5 TABLET ORAL at 11:28

## 2021-04-30 RX ADMIN — FLUOXETINE HYDROCHLORIDE 60 MG: 40 CAPSULE ORAL at 07:49

## 2021-04-30 RX ADMIN — LORAZEPAM 0.5 MG: 2 INJECTION INTRAMUSCULAR; INTRAVENOUS at 19:38

## 2021-04-30 RX ADMIN — BUSPIRONE HYDROCHLORIDE 30 MG: 15 TABLET ORAL at 23:34

## 2021-04-30 RX ADMIN — PREDNISONE 50 MG: 20 TABLET ORAL at 07:48

## 2021-04-30 RX ADMIN — MESALAMINE 1000 MG: 1000 SUPPOSITORY RECTAL at 23:36

## 2021-04-30 RX ADMIN — INSULIN ASPART 1 UNITS: 100 INJECTION, SOLUTION INTRAVENOUS; SUBCUTANEOUS at 12:01

## 2021-04-30 RX ADMIN — LACTULOSE 3 G: 20 SOLUTION ORAL at 07:50

## 2021-04-30 RX ADMIN — ACYCLOVIR: 50 OINTMENT TOPICAL at 12:06

## 2021-04-30 RX ADMIN — Medication 2.5 MG: at 07:47

## 2021-04-30 RX ADMIN — DOXEPIN HYDROCHLORIDE 3 MG: 10 SOLUTION ORAL at 23:49

## 2021-04-30 RX ADMIN — ENOXAPARIN SODIUM 40 MG: 100 INJECTION SUBCUTANEOUS at 18:49

## 2021-04-30 RX ADMIN — POLYETHYLENE GLYCOL 3350 17 G: 17 POWDER, FOR SOLUTION ORAL at 07:51

## 2021-04-30 RX ADMIN — SODIUM PHOSPHATE, MONOBASIC, MONOHYDRATE 9 MMOL: 276; 142 INJECTION, SOLUTION INTRAVENOUS at 09:53

## 2021-04-30 RX ADMIN — LINACLOTIDE 290 MCG: 145 CAPSULE, GELATIN COATED ORAL at 07:49

## 2021-04-30 RX ADMIN — LORAZEPAM 0.5 MG: 2 INJECTION INTRAMUSCULAR; INTRAVENOUS at 01:35

## 2021-04-30 RX ADMIN — ACETAMINOPHEN 975 MG: 325 TABLET, FILM COATED ORAL at 15:56

## 2021-04-30 RX ADMIN — FUROSEMIDE 20 MG: 10 INJECTION, SOLUTION INTRAMUSCULAR; INTRAVENOUS at 12:01

## 2021-04-30 RX ADMIN — PANTOPRAZOLE SODIUM 40 MG: 40 INJECTION, POWDER, FOR SOLUTION INTRAVENOUS at 07:51

## 2021-04-30 ASSESSMENT — ACTIVITIES OF DAILY LIVING (ADL)
ADLS_ACUITY_SCORE: 25
ADLS_ACUITY_SCORE: 19
ADLS_ACUITY_SCORE: 19

## 2021-04-30 NOTE — PROGRESS NOTES
BP (!) 145/81 (BP Location: Left arm)   Pulse 103   Temp 98.8  F (37.1  C) (Axillary)   Resp 24   Wt 73.1 kg (161 lb 2.5 oz)   SpO2 97%   BMI 28.55 kg/m       Neuro: A&Ox4. Anxious.  Cardiac: Afebrile, VSS.   Respiratory: 3-4L NC.   GI/: Voiding spontaneously.( pure wick) No BM this shift.   Diet/appetite: Tolerating diet. Denies nausea   Activity: turn/repo- Lift  Pain: scheduled oxy.  Skin: No new deficits noted.  Lines:PICC  Drains: Pure wick.  No new complaints.Will continue to monitor and follow plan of care.

## 2021-04-30 NOTE — PROGRESS NOTES
Simulation completed.  Ms. Bailey tolerated lying supine with mask on.    She will be evaluated by Dr. Spann tomorrow and a radiation plan will be finalized at that time.    Ramírez Holt MD
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
## 2021-04-30 NOTE — PLAN OF CARE
NEURO: Waxes and wanes, disoriented upon waking but reorients fairly easily  VITALS: Blood pressure 125/89, pulse 110, temperature 98.5  F (36.9  C), temperature source Axillary, resp. rate 25, weight 71 kg (156 lb 8.4 oz), SpO2 97 %.  PAIN: Denies, given sched oxy for dyspnea  CARDIAC: ST  RESPIRATORY: 4-5L oxymask or 35% bipap. When asleep on bipap RR 20-22, otherwise RR 25-30.   GI: Bowel sounds active, BM x1, poor oral intake, incont of bowel  : Adequate UOP, incont of urine  LDA: PICC  IV: TPN & lipids running, other lines TKO  ACTIVITY: Pt up in chair today for 20min with Ax2 and lift, tolerated ok. Otherwise, repo q2h with Ax2  GOALS: Anxiety reduction

## 2021-04-30 NOTE — PROGRESS NOTES
"SPIRITUAL HEALTH SERVICES  SPIRITUAL ASSESSMENT Progress Note (Palliative Focus)  [facility] ([place]) 6B    REFERRAL SOURCE: Palliative care follow up.    Visit with patient Olga Bailey at her bedside.     Olga welcomed a  visit and wanted to reflect on the ways today has been a \"better day\" for her, mainly in being \"not as anxious\". She said she knows that her spirits rise and fall with her level of anxiety. There are some environmental factors that help her manage anxiety. These include being in the bed by the window if in a shared room, which she is today, and maintaining a quieter environment, with neither large groups of people nor too much noise.    Olga is grateful for today being a day when she has less anxiety and noted a sense of increased peace. She was appreciative of spiritual support through reflective listening.    Plan: I will follow for spiritual support while Palliative Care is consulted.    Evelia Sawant  Palliative   Pager [phone]  [facility] Inpatient Team Consult pager [phone] (M-F 8-4:30)  After-hours Answering Service [phone]    "

## 2021-04-30 NOTE — PROGRESS NOTES
Hematology / Oncology  Daily Progress Note   Date of Service: 04/30/2021  Patient: Olga Bailey  MRN: [id number]  Admission Date: 4/26/2021  Hospital Day # 4  Cancer Diagnosis: Glioblastoma  Primary Outpatient Oncologist: Dr. Baker  Current Treatment Plan: S/p tumor resection 2/23/21. Plan is to undergo radiation; tentative start date 5/4.     Assessment & Plan:   Olga Bailey is a 75 year old year old female with history of left frontoparietal glioblastoma s/p tumor resection 2/23/21. The plan was to undergo adjuvant chemoradiation, which has been held for prolonged admission at [facility] (3/26-4/26/21) with pneumonia and multiple DVTs, complicated by retroperitoneal hemorrhage and shock s/p IVC filter placement. She was transferred to [facility] on 4/26/21 for further Oncology and Radiation Oncology care.      Recommendations:   - Care conference scheduled 5/3 at 1300; oncology team will be in attendance.  - Seen by Dr. Spann (radiation oncology) via iPad visit 4/29 to establish care. Plan to discuss at tumor board on 5/3 AM, followed by care conference 5/3 PM. Tentative plan for 15 fractions over 15 weekdays (starting 5/4).   - Continue Seroquel for anxiety. Appreciate psychiatry, health psychology input.  - Appreciate PT/OT/SLP input.     # Glioblastoma Multiforme  Follows with Dr. Baker. For further detail of oncologic history, please see below. In summary, patient was diagnosed with a left frontoparietal brain glioblastoma s/p resection 2/23/2021. Seen by Dr. Baker on 3/23, recommended temozolomide and radiation however treatment has not been initiated yet due to prolonged admission at [facility] with pneumonia, B/l LE DVTs, and RP hemorrhage. Ultimately transferred to [facility] where she could receive radiation treatment as appropriate.    - In the setting of current poor performance status, she is not a candidate for Temodar at this time. If patient's performance status greatly improves, we  could consider starting temozolomide as determined by Dr. Baker.   - Radiation Oncology consulted. Seen by Dr. Spann via iPad visit 4/29. Will plan to discuss at tumor board on 5/3 AM, followed by care conference 5/3 PM, however tentative plan for 15 fractions over 15 weekdays starting 5/4. Will revisit to obtain formal consent on 5/3.     # Bilateral LE DVTs  # Retroperitoneal bleed  B/l LE DVTs noted on 3/29/21.   - Started therapeutic anticoagulation. C/b right retroperitoneal bleed 4/14/21 and subsequently developed acute hypoxic respiratory failure.   - IVC filter placed with IR on 4/16/21.   - Bilateral LE ultrasound showed improvement of previously diagnosed DVTs, though not resolution   - keep IVC filter   - anticoagulation per primary team, but ok to continue ppx lovenox    # SOB  # Dyspnea  # Recent PJP pneumonia  # Anxiety  Olga continues to have SOB and tachypnea. Breathing frequently. She continues to struggle with anxiety at night and starts to breath very quickly which tires her out and requires her to be on BiPap. Slept well after she had difficulty with anxiety.    - ECHO showed hyperkinetic with EF >70%  - CXR has stable mixed opacities bilaterally  - With medical workup fairly unremarkable, concern remains an element of anxiety causing her to become tachypneic. Psychiatry consulted (4/29) with recommendation to initiate Seroquel 12.5 - 25 mg AM and 1800 scheduled; hold if somnolent. Can always increase or trial 25 - 50 mg at bedtime as well. Recommend health psychology consultation as well.     # Malnutrition  Regarding patient's nutrition status, currently on TPN. Pending the results of the swallow study, we could consider a PEG tube if patient wants to continue to pursue treatment vs TPN   - This ultimately would warrant a GOC conversation as her goals of treatment would dictate whether we continue TPN or transition to PEG tube (this is also dependent on her ability to restore her own swallow  function)  - If there are signs that she could continue to have restorative function to her swallowing, would continue TPN in the setting that she would be able to eat by mouth eventually     We will continue to follow this patient. Please do not hesitate to page with any questions or concerns.     Patient was seen and plan of care was discussed with attending physician Dr. Fair.     Radha Richardson PA-C  Hematology/Oncology   Pager: [phone]    Oncologic History:  - 2/2021 PRESENTATION: Progressive aphasia.   - 2/19/2021 MR brain imaging with 3.3 x 2.8 x 2.8 cm enhancing mass in left frontal-parietal region. A second contrast enhancing lesion (0.5 x 1.0 x 1.0 cm) in right occipital lobe which is dural based and largely stable in size since 9/2011; likely representing a meningioma.  - 2/23/2021 SURGERY: Gross total resection by Dr. Cummings  PATHOLOGY: Glioblastoma; IDH1-R132H wild-type/ IDH 1 and 2 wildtype, MGMT promoter methylated. Not BRAF mutated.   - 3/23/2021 Established care with Dr. Baker. Plan was to initiate chemotherapy with temozolomide 75mg/m2 (140mg) daily in the evening until completion of radiation. Temozolomide held for admission to [facility]. Adjuvant radiation was planned at [facility], held for dyspnea and subsequently admitted to [facility] with acute hypoxic respiratory failure and pneumonia.   __________________________________________________________________    Subjective & Interval History:    No acute events noted overnight. Reports last night was a better night and able to sleep. No noted episodes of shortness of breath. Initially forgetful, however able to recall talking with a doctor about radiation. Discussed plan to continue getting stronger over the weekend, then likely start radiation on Tuesday. Will trial thickened liquids today at bedside with assistance. No new or worsening pain. Encouraged to sit up in chair and work with therapy as able. Denies any new or worsening symptoms.      Complete and Comprehensive review of systems review and negative other than noted here or in the HPI.     Physical Exam:    Blood pressure (!) 145/81, pulse 103, temperature 98.8  F (37.1  C), temperature source Axillary, resp. rate 24, weight 73.1 kg (161 lb 2.5 oz), SpO2 97 %.    Constitutional: Pleasant female lying in bed.Tired but comfortable Non- toxic appearing. No acute distress.   HEENT: Normocephalic, atraumatic. Sclerae anicteric. EOM intact.   Respiratory: Breathing comfortable on NC  Cardiovascular: Appears well perfused  Skin: Skin is clean, dry, intact. No jaundice appreciated.   Musculoskeletal/ Extremities: No redness, warmth, or swelling of the joints appreciated. Distal pulses palpable. Nailbeds pink and without cyanosis or clubbing.   Neurologic: Alert and oriented. Speech normal. Grossly nonfocal. Memory and thought process preserved.   Neuropsychiatric: Calm, affect congruent to situation. Appropriate mood and affect. Good judgment and insight. No visual/auditory hallucinations.       Labs & Studies: I personally reviewed the following studies:  ROUTINE LABS (Last four results):  CMP  Recent Labs   Lab 04/30/21  0458 04/29/21  0742 04/29/21  0545 04/28/21  1601 04/28/21  0535 04/27/21  0548 04/27/21  0548     --  135  --  134  --  132*   POTASSIUM 3.5  --  3.5 5.3 3.8   < > 3.6   CHLORIDE 102  --  101  --  102  --  100   CO2 29  --  30  --  26  --  25   ANIONGAP 6  --  4  --  6  --  8   *  --  110*  --  155*  --  166*   BUN 27  --  29  --  16  --  16   CR 0.39*  --  0.43*  --  0.40*  --  0.36*   GFRESTIMATED >90  --  >90  --  >90  --  >90   GFRESTBLACK >90  --  >90  --  >90  --  >90   ESTIVEN 8.5  --  8.6  --  8.7  --  8.5   MAG 2.1  --  2.4* 2.8* 2.3   < > 2.1   PHOS 2.7 2.6 2.7 6.9* 3.2   < > 3.8   PROTTOTAL 5.0*  --  5.3*  --  5.4*  --  5.2*   ALBUMIN 2.4*  --  2.6*  --  2.5*  --  2.3*   BILITOTAL 0.4  --  0.3  --  0.3  --  0.4   ALKPHOS 117  --  116  --  116  --  126   AST 37   --  34  --  35  --  37   ALT 58*  --  70*  --  82*  --  92*    < > = values in this interval not displayed.     CBC  Recent Labs   Lab 04/30/21  0458 04/29/21  0545 04/28/21  0535 04/27/21  0548   WBC 11.5* 13.8* 13.7* 13.2*   RBC 2.89* 3.12* 3.20* 2.99*   HGB 8.7* 9.2* 9.3* 8.7*   HCT 27.3* 29.5* 29.1* 26.7*   MCV 95 95 91 89   MCH 30.1 29.5 29.1 29.1   MCHC 31.9 31.2* 32.0 32.6   RDW 19.0* 18.6* 17.8* 17.2*    233 219 216     INR  Recent Labs   Lab 04/27/21  0548   INR 1.02       Medications list for reference:  Current Facility-Administered Medications   Medication     acetaminophen (TYLENOL) tablet 975 mg     acyclovir (ZOVIRAX) 5 % ointment     albuterol (PROAIR HFA/PROVENTIL HFA/VENTOLIN HFA) 108 (90 Base) MCG/ACT inhaler 2 puff     amLODIPine (NORVASC) tablet 10 mg     bisacodyl (DULCOLAX) Suppository 10 mg     busPIRone (BUSPAR) tablet 30 mg     carboxymethylcellulose PF (REFRESH PLUS) 0.5 % ophthalmic solution 1 drop     dextrose 10% infusion     glucose gel 15-30 g    Or     dextrose 50 % injection 25-50 mL    Or     glucagon injection 1 mg     doxepin (SINEquan) 10 MG/ML (HIGH CONC) solution 3-6 mg     enoxaparin ANTICOAGULANT (LOVENOX) injection 40 mg     FLUoxetine (PROzac) capsule 60 mg     insulin aspart (NovoLOG) injection (RAPID ACTING)     insulin aspart (NovoLOG) injection (RAPID ACTING)     lactulose (CHRONULAC) solution 3 g     levETIRAcetam (KEPPRA) 250 mg in sodium chloride 0.9 % 100 mL intermittent infusion     lidocaine (LMX4) cream     lidocaine 1 % 0.1-1 mL     linaclotide (LINZESS) capsule 290 mcg     lipids (INTRALIPID) 20 % infusion 250 mL     LORazepam (ATIVAN) injection 0.5 mg     melatonin tablet 6 mg     mesalamine (CANASA) Suppository 1,000 mg     naloxone (NARCAN) injection 0.2 mg    Or     naloxone (NARCAN) injection 0.4 mg    Or     naloxone (NARCAN) injection 0.2 mg    Or     naloxone (NARCAN) injection 0.4 mg     ondansetron (ZOFRAN) injection 4 mg     oxyCODONE IR  (ROXICODONE) half-tab 2.5 mg     oxyCODONE IR (ROXICODONE) half-tab 2.5-5 mg     pantoprazole (PROTONIX) IV push injection 40 mg     parenteral nutrition - ADULT compounded formula     parenteral nutrition - ADULT compounded formula     polyethylene glycol (MIRALAX) Packet 17 g     [START ON 5/1/2021] predniSONE (DELTASONE) tablet 40 mg    Followed by     [START ON 5/3/2021] predniSONE (DELTASONE) tablet 30 mg    Followed by     [START ON 5/4/2021] predniSONE (DELTASONE) tablet 20 mg    Followed by     [START ON 5/5/2021] predniSONE (DELTASONE) tablet 10 mg     prochlorperazine (COMPAZINE) injection 5 mg     QUEtiapine (SEROquel) tablet 25 mg     QUEtiapine (SEROquel) tablet 50 mg     sennosides (SENOKOT) tablet 8.6 mg     simethicone (MYLICON) suspension 40 mg     sodium chloride (PF) 0.9% PF flush 10 mL     sodium chloride (PF) 0.9% PF flush 10 mL     sodium chloride (PF) 0.9% PF flush 10 mL     sodium chloride (PF) 0.9% PF flush 10 mL     sodium phosphate (NaPHOS) 9 mMol in 250 mL D5W (pre-mix) infusion     sulfamethoxazole-trimethoprim (BACTRIM) 80 mg in D5W 115 mL intermittent infusion

## 2021-04-30 NOTE — PROGRESS NOTES
Radiation Oncology Note    Ms. Bailey was seen in consultation via telemedicine visit today with her daughter present via [place]. We discussed the current plan to proceed with radiation therapy (15 fractions over 15 weekdays) for treatment of Ms. Bailey's glioblastoma. We will continue to monitor her status as she receives care for her pneumonia. Her case will be discussed at tumor board on Monday and I let her and her daughter that I will be in contact on Monday afternoon to confirm our current plan of RT starting on Tuesday. They were in agreement with this plan.     Full note to follow.     Please page with questions.     Betsy Spann MD, PhD  Department of Radiation Oncology  Pager: [phone]

## 2021-04-30 NOTE — PROGRESS NOTES
Per  SLP documentation    Recommend dysphagia diet 1 and nectar-thick liquids, no straws, with supervision and feeding assist.  Recommend critical meds given crushed in puree or via FT.  Ensure pt is fully alert and upright for all PO, given small bites/sips, alternating bites/sips.

## 2021-04-30 NOTE — PLAN OF CARE
6B PT: Cancel, pt with other providers upon attempt in AM, unable to check back later in day. Will reschedule per PT POC.

## 2021-04-30 NOTE — PLAN OF CARE
Neuro: Slept most of the night, easily aroused while sleeping. Able to sleep between cares. Oriented, forgetful.  Able to use call light and ask for help. Cares clustered to enhance sleep. Received lorazepam PRN once.   Cardiac:  SR, high 90's.        Respiratory: Sating adequately on BiPAP 45% FiO2 until 0130, 3 LPM NC  GI/: Adequate urine output via Purewick. No BM  Diet/appetite: Tolerating diet. TPN lipids  Activity:  Turn Q 2 hours. Bedrest  Pain: At acceptable level on current regimen.   Skin: No new deficits noted.  LDA's:  PICC  Purewick.   Plan: Continue with POC. Notify primary team with changes.

## 2021-04-30 NOTE — PROGRESS NOTES
Resident/Fellow Attestation   I, Melina Sanabria, was present with the medical/RICKY student who participated in the service and in the documentation of the note.  I have verified the history and personally performed the physical exam and medical decision making.  I agree with the assessment and plan of care as documented in the note.      In brief, pt with acute hypoxic respiratory failure 2/2 recent presumed PJP pneumonia and TRALI. Intermittent tachypnea and tachycardia that improves with anxiety medications. Getting significant IV fluids for meds so will diurese to maintain euvolemia. Increased scheduled anxiety meds yesterday with some improvement. Pt with GBM, anticipate start of radiation therapy next Tuesday. Remainder per medical student note.    Melina Sanabria MD  PGY1  Date of Service (when I saw the patient): 04/30/21      [facility]    Progress Note - Maroon 3 Service        Date of Admission:  4/26/2021    Assessment & Plan   Olga Bailey is a 75 y.o. 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    Today:  - IV lasix 20 mg x1, goal I/O net even  - Planning for care conference Monday 5/3     Acute hypoxic respiratory failure   Bilateral Pneumonia; presumed PJP  Concern for transfusion-related acute lung injury (TRALI)  Concern for R hemidiaphragm paralysis  Dyspneic/hypoxic in late March for admission at OSH. Initially treated with ceftri/doxy. ID switched to course of Zosyn/doxy with Bactrim treatment dosing for PJP (had been on steroids after admission for glioblastoma resection; Fungitell positive but Galactomannan negative). Unable  to get adequate sputum samples for diagnosis of PJP. Improved with the Bactrim until several days after RP bleed (see below). W/u for PE, COVID, and respiratory viral panel was negative. Procal low. Bilateral lung opacities present. Concern for ongoing PJP infection (Bactrim was re-started), inflammatory lung condition (increased steroids), volume overload (diuresed for several days with IV Lasix). Imaging shows R hemidiaphragm elevation (concerning for paralysis?), and opacities appear to be improving as are oxygen requirements (weaned from high-flow to Oxymask 4-7L now). Pulmonology at OSH was considering bronch if no improvement, though pulm and ID here find no benefit to pursuing this now with her improvement. Has had several RRTs for tachypnea, appear primarily related to anxiety. Quickly improved with PRN anxiety medication and Bipap. VBGs normal (although had been on BiPAP ~15 minutes), EKG, CXR, not concerning.  - Pulmonology consulted, following peripherally  - Working on control anxiety as below   - IV lasix 20 mg x1, re-assess diuresis daily  - pulm, ID consults   - wean steroids: from methylpred IV 62.5 mg BID -> prednisone 60 mg x 2 d (starting 4/28) -> pred 40 mg x 2 d -> pred 30 mg x 1 d -> pred 20 mg x 1 d -> pred 10 mg x 1 d   - steroid taper ok from Onc perspective   - Will monitor for signs of adrenal insufficiency     - both agree on holding off on bronch (this is also family/pt's preference)  - PJP ppx: Bactrim single strength qday x 1.5 mo after steroids end  - Palliative care consulted for assistance in symptom management; appreciate recs   - PJP stain, PCR; conventional sputum culture ordered if patient can produce sputum   - f/u blood cultures - NGTD  - wean O2 as able to keep sats > 92%  - Bipap when sleeping and PRN for comfort  - Scheduled oxycodone 2.5 mg TID (for ongoing dyspnea)  - SLP consulted - FLD and nectar thick liquids   >Will touch base to consider if appropriate for pureed  diet (4/28 SLP note says residual puree seen on pharynx on VSS)    - Would not recommend CT PE increased O2 requirements and tachycardia despite anxiety interventions. Anticoagulation contraindicated in setting of recent hemorraghic, DW hematology and plan would be to continue prophylactic lovenox dosing regardless. Would contact pulmonology however.      Glioblastoma Multiforme  Left frontoparietal brain glioblastoma status post resection 2/23/2021. Seen by radiation oncology 3/24 recommended XRT and chemo radiation. However subsequently admitted for resp failure. MRI brain here signs of possible disease recurrence. Hem/onc concerned that she might not be a candidate for chemotherapy or radiation currently due to poor performance status.  -Rad/onc following; appreciate recs   -Patient being presented at CNS tumor conference 5/3   -Tentatively planning to start radiation course will inpatient 5/4  -Heme/onc consulted, appreciate recs   - Patient not strong enough for chemo   - would consider temozolomide at late date pending clinical course (needs to improve performance status)  - Anticipate care conference 5/3/21    Bilateral lower extremity DVT  Right retroperitoneal hematoma   On 3/29 at outside hospital patient's O2 needs increased, duplex ultrasound revealed bilateral lower extremity DVT. CT PE negative for embolism.  Treated with IV heparin and transitioned to Lovenox 70 mg.  On 4/14 this was complicated by retroperitoneal bleed, requiring 4 units of packed red blood cells.  Lovenox was held, and IVC filter was placed on 4/16. Vascular surgery was involved, and a CT abdomen was stable bleed so no surgical intervention was required.  Eventually resumed on prophylactic 40mg of Lovenox twice daily.  -Continue 40 mg Lovenox qday  -hem/onc consulted; appreciate recs   - pain plan:              - Tylenol 975 mg q8h brianna              - oxycodone 2.5-5 mg q4h PRN for pain              - discontinued Dilaudid to avoid  delirium  - Asymmetric foot swelling , 4/30/2021. Will not obtain US given there are no changes we would make to the anticoagulation plan;had discussed with heme on 4/29/21      Major depressive disorder, recurrent  Anxiety  Follows with psychiatry and psychology as outpatint.  Saw health psychology during admission for GBM, made plan for outpatient follow-up and med adjustment,  but was unable.  Given prolonged admission patient could benefit from inpatient consultation again and patient requesting this.  - Psychiatry consulted, appreciate recs  - Palliative recs appreciated;  - Seroquel 25 mg BID + 50 mg at bedtime  - Ativan 0.5 mg TID PRN (DC'ed PO)  - Scheduled doxepin PRN at night for sleep  - c/t PTA Buspar  - PTA prozac (dose reduced from 80 to 60 mg)  - Health psychology consult; appreciate recs     Ileus  Began after bleed at outside hospital 4/14; was on TPN for nutrition.  GI was consulted for assistance with bowel management.  With escalating bowel regimen finally had small BM 4/25 after tap water enema, had some liquid stool 4/27. AXR shows non-obstructive bowel gas pattern.  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 Urine sodium 50 while her sodium was 128, certainly consistent with this. Neurologic etiology with her GBM resection likely. Now trending up.   - trend Na; consider fluid restriction if any concern for worsening Na     Shock Liver, improving  LFTs sharply up after RP bleed at OSH, Ethel due to shock liver. LFT's have steadily trended down since. Only AST remains elevated and trending down.   -Recheck CMP in am    Hyperglycemia   Steroid induced. Intermittently needed insulin at OSH.   -Low sliding scale  insulin  -Gluc checks     Leukocytosis  Ddx is infection vs steroid-induced.  - trend CBC     Hypertension  Sinus tachycardia  Started on metoprolol at OSH for sinus tachycardia.  - c/t PTA amlodipine 10 mg qday  - hold BB    Chronic medical problems:  - albuterol PRN  - BIPAP at bedtime (on CPAP at home) and for comfort  - GERD: back to PTA PO PPI     Diet: Full Liquid Diet Nectar Thickened Liquids (pre-thickened or use instant food thickener)  Snacks/Supplements Adult: Ensure Enlive; Between Meals  parenteral nutrition - ADULT compounded formula  parenteral nutrition - ADULT compounded formula    Fluids: TPN  Lines: PICC line  DVT Prophylaxis: Enoxaparin (Lovenox) SQ  Martino Catheter: not present  Code Status: Full Code         Disposition Plan   Expected discharge: > 7 days, recommended to transitional care unit once respiratory status improves, plan for rehab and chemo/radiation in place .  Entered: Bentley Ram 04/30/2021, 11:30 AM     The patient's care was discussed with the Attending Physician, Dr. Tomlin.    Bentley Ram  Medical Student  [address]  Please see sign in/sign out for up to date coverage information    ______________________________________________________________________    Interval History    No acute events overnight. Patient not tolerating BiPAP; received x1 ativan PRN. Slept on NC thereafter. This am the patient is doing better with the scheduled Seroquel from an anxiety standpoint. States that anxiety is her main concern now. She does feel less SOB today. Mild abdominal pain is unchanged. No chest pain, dysuria, headache, new weakness or numbness.      4 Point ROS obtained and negative except as noted above.     Data reviewed today: I reviewed all medications, new labs and imaging results over the last 24 hours. I personally reviewed   Physical Exam   Vital Signs: Temp: 98.8  F (37.1  C) Temp src: Axillary BP: (!) 145/81  Pulse: 103   Resp: 24 SpO2: 97 % O2 Device: Nasal cannula Oxygen Delivery: 4 LPM  Weight: 161 lbs 2.5 oz   General Appearance: lying propped up in bed, appears anxious  Eyes: no scleral icterus, EOMI  HEENT: neck supple, normocephalic, NC in place. Dry appearing mucous membranes   Respiratory: mild accessory muscle use on 4L NC, coarse breath sounds in apices bilaterally, L>R fine crackles in anterior fields  Cardiovascular:  Tachycardia, regular rhythm, normal S1/S2, no murmurs appreciated, intact distal pulses  GI: soft, mild distension, mild diffuse tenderness to palpation  Skin: scattered bruises over upper exposed upper extremities, no rash noted  Musculoskeletal: Non-pitting edema left foot, No edema of right foot. No edema of calves or upper extremities.   Neurologic: Moves all extremities spontaneously, follows commands.   Psychiatric: Anxious appearing     Data   Recent Labs   Lab 04/30/21  0458 04/29/21  0545 04/28/21  1601 04/28/21  0831 04/28/21  0535 04/28/21  0026 04/27/21  0548 04/27/21  0548   WBC 11.5* 13.8*  --   --  13.7*  --   --  13.2*   HGB 8.7* 9.2*  --   --  9.3*  --   --  8.7*   MCV 95 95  --   --  91  --   --  89    233  --   --  219  --   --  216   INR  --   --   --   --   --   --   --  1.02    135  --   --  134  --   --  132*   POTASSIUM 3.5 3.5 5.3  --  3.8  --    < > 3.6   CHLORIDE 102 101  --   --  102  --   --  100   CO2 29 30  --   --  26  --   --  25   BUN 27 29  --   --  16  --   --  16   CR 0.39* 0.43*  --   --  0.40*  --   --  0.36*   ANIONGAP 6 4  --   --  6  --   --  8   ESTIVEN 8.5 8.6  --   --  8.7  --   --  8.5   * 110*  --   --  155*  --   --  166*   ALBUMIN 2.4* 2.6*  --   --  2.5*  --   --  2.3*   PROTTOTAL 5.0* 5.3*  --   --  5.4*  --   --  5.2*   BILITOTAL 0.4 0.3  --   --  0.3  --   --  0.4   ALKPHOS 117 116  --   --  116  --   --  126   ALT 58* 70*  --   --  82*  --   --  92*   AST 37 34  --   --  35  --   --  37   TROPI  --   --   --  0.044 0.041  0.048*   < >  --     < > = values in this interval not displayed.     No results found for this or any previous visit (from the past 24 hour(s)).

## 2021-04-30 NOTE — PROGRESS NOTES
[facility] General ID Service: Sign Off      Patient:  Olga Bailey, Date of birth 1945, Medical record number [id number]  Date of Visit:  04/30/2021          Assessment and Recommendations:   RECOMMENDATION:  1. Continue Bactrim to 400-80mg/day (one single tablet equivalent) PO (or IV) for PJP secondary prophylaxis. Continue for 1.5 months following eventual discontinuation of steroids.  2. No indication to continue steroids for any PJP indication since her treatment course has been completed.  Please taper the steroids as rapidly as is feaisble per Pulmonology Consult input (given concern for TACO/TRALI) and the need to avoid adrenal insufficiency (given that she has been on steroid therapy for two+ months).  3. There does not seem to be any indication at present for additional infection-related work-up (such as a bronchoscopy). New intermittent lactic acidosis thought to be 2/2 hypoxic events with anxiety attacks.  4. No post-discharge follow-up in [facility] is anticipated to be needed.    With the absence of any ongoing active infection and with the above Recommendations, General ID will sign off now.  Thanks for allowing us to participate in the care of this pleasant woman.  Please page me if any acute ID questions or concerns arise, but I will continue to follow her informally over the next couple of weeks in case any issues that I can be helpful with occur.  Feel free to page me at any point.    Bean Mckay MD  Pager [phone]     ASSESSMENT:  1. PJP pneumonia, with extended course of high dose steroids, resolved.    2. Intermittent lactic acidosis, thought to be 2/2 transient hypoxic events with anxiety     DISCUSSION:   Olga Bailey is a 75 year-old woman with a history of asthma, HTN, MARCELLE, GERD, depression, and recent hospitalization 2/17-3/1/21 for glioblastoma multiforme s/p resection 2/23 who presents to [facility] as a transfer from [facility] following admission for PJP  pneumonia. Patient has been admitted for consideration of inpatient chemotherapy and radiation for her glioblastoma.     # Intermittent Lactic Acidosis   # Concern for pulmonary infection  Patient is s/p multiple antibiotic courses, and has been afebrile. Supplement O2 needs have been decreasing overall, currently on 4L NC. WBC increased slightly above normal limits, but patient has been on prolonged course of steroids. CRP downtrending at 13 4/27, at 10 4/28.     Patient had rapid response called evening of 4/27 for increased respiratory rate, hypoxia, and a lactic acidosis of 2.9. Originally thought due to hypoxia 2/2 panic attack vs worsening ARDS vs new infectious process. Patient was afebrile with stable WBC. CXR with mild improvement of opacities. She was reported as being very anxious at the time. Patient received a dose of lorazepam and had subsequent resolution of tachypnea. Repeat lactic acid 1.8 two hours later. Given the quick resolution of lactic acidosis with stable vitals and WBC, lactic acidosis most-likely the result of an hypoxic event secondary to a panic attack vs the [place] effect given her GBM, likely exacerbated by the underlying inflammatory state of her lungs. Given the above, there is a low likelihood patient currently has new infection. No further infectious workup or bronchoscopy recommended at this time.     # PJP Pneumonia  Patient completed a 21-day course of Bactrim with concurrent steroids for PJP treatment 3/26-4/15, and has continued on Bactrim since. Given complete course with improving symptoms prior to retroperitoneal bleed, we believe PJP pneumonia to be treated at this time. Patient should continue Bactrim at lower dose (400-80mg/day) while on high-dose steroids, and for 1.5 months following last day of steroids.     Thank you for including us in the care of Ms. Bailey.    General ID will sign off now.     Patient was discussed with Dr. Mckay.      Nico Nielson,  MS3    General ([place]) ID Consult Attending Attestation:     Ms. Bailey was seen and evaluated by me, Bean Mckay MD. The case was discussed at length with the ID Fellow, Dr. Mehta, and the Medical Student ANA MARÍA huang, MS3. The history and examination were co-obtained by myself in conjunction with our ID team and I agree with their interval history and examination, assessment and recommendations in this General ID Progress Note for today. This note reflects our joint decision-making and notation. I have reviewed today's vital signs, medications, labs and imaging and the history and examination, review of systems, assessment and recommendations reflect my observations and opinions.    General ID will sign off now, but I will continue to follow her informally over the next couple of weeks in case any issues that I can be helpful with occur.  Feel free to page me at any point.    Bean Mckay MD  Pager [phone]        Interval History:   - Patient continues to feel that anxiety is her main acute concern, though she feels like they are starting to be more controlled  - No SOB when not feeling anxious  - No fevers, chills, nausea, abdominal or chest pain  - Appetite increasing           Review of Systems:   Full 9 pt ROS obtained, pertinent positives and negatives as above.          Current Antimicrobials   Current:  TMP-SMX (3/29 - )    Prior:  Ceftriaxone (3/27 - 3/29/21)  Doxycycline (3/27 - 4/1/21)  Zosyn (3/29 - 4/4/21)         Physical Exam:   Ranges for vital signs:  Temp:  [97.4  F (36.3  C)-98.8  F (37.1  C)] 98.8  F (37.1  C)  Pulse:  [] 103  Resp:  [20-32] 24  BP: (125-151)/(75-89) 145/81  FiO2 (%):  [35 %] 35 %  SpO2:  [89 %-97 %] 97 %    Intake/Output Summary (Last 24 hours) at 4/28/2021 0946  Last data filed at 4/28/2021 0930  Gross per 24 hour   Intake 2268.53 ml   Output 2100 ml   Net 168.53 ml     Exam:  GENERAL:  Well-developed, well-nourished, lying in bed with nasal canula  donned. Appears sleeping, fell asleep once during conversation. Easily aroused to voice.  No acute distress.   ENT:  Head is normocephalic, atraumatic. Oropharynx is moist without exudates or ulcers.  EYES:  Eyes have anicteric sclerae.    NECK:  Supple.  LUNGS:  CTAB. Regular breathing effort.  CARDIOVASCULAR:  Slightly tachycardic with regular rhythm. No murmurs, gallops or rubs.  ABDOMEN:  Normal bowel sounds, soft, nontender.  EXT: Extremities warm and without edema.  SKIN:  No acute rashes.  Line is in place without any surrounding erythema.  NEUROLOGIC:  Some difficulty word finding.         Laboratory Data:     Recent Labs   Lab 04/30/21  0458 04/29/21  0545 04/28/21  1601 04/28/21  0831 04/28/21  0535 04/28/21  0026 04/27/21  0548 04/27/21  0548   WBC 11.5* 13.8*  --   --  13.7*  --   --  13.2*   HGB 8.7* 9.2*  --   --  9.3*  --   --  8.7*   MCV 95 95  --   --  91  --   --  89    233  --   --  219  --   --  216   INR  --   --   --   --   --   --   --  1.02    135  --   --  134  --   --  132*   POTASSIUM 3.5 3.5 5.3  --  3.8  --    < > 3.6   CHLORIDE 102 101  --   --  102  --   --  100   CO2 29 30  --   --  26  --   --  25   BUN 27 29  --   --  16  --   --  16   CR 0.39* 0.43*  --   --  0.40*  --   --  0.36*   ANIONGAP 6 4  --   --  6  --   --  8   ESTIVEN 8.5 8.6  --   --  8.7  --   --  8.5   * 110*  --   --  155*  --   --  166*   ALBUMIN 2.4* 2.6*  --   --  2.5*  --   --  2.3*   PROTTOTAL 5.0* 5.3*  --   --  5.4*  --   --  5.2*   BILITOTAL 0.4 0.3  --   --  0.3  --   --  0.4   ALKPHOS 117 116  --   --  116  --   --  126   ALT 58* 70*  --   --  82*  --   --  92*   AST 37 34  --   --  35  --   --  37   TROPI  --   --   --  0.044 0.041 0.048*   < >  --     < > = values in this interval not displayed.        4/27/2021 17:11 4/27/2021 19:35 4/28/2021 08:31   Lactic Acid  1.8 3.0 (H)   Lactate for Sepsis Protocol 2.9 (H)         Culture data:  Blood cultures 4/26: NGTD  Blood cultures 4/28:  NGTD         Imaging:   CXR 4/29/21  IMPRESSION:   Stable small right pleural effusion and diffuse mixed airspace  opacities.    CXR 4/28/21  Findings:   Right arm PICC tip in the high right atrium. Stable cardiomegaly.  Stable mixed opacities throughout both lungs. Stable elevation of the  right hemidiaphragm. No significant pleural effusions.     CXR 4/27/21  IMPRESSION:   Diffuse bilateral mixed interstitial and airspace opacities, slightly  improved in the right lung.    MR Brain w/ contrast 4/26/21  Impression: Postsurgical changes of left parietal craniotomy and mass  resection. There is peripheral T1 hyperintensity of the resection  cavity with focal nodule of the superior resection cavity measuring  1.0 cm, which may represent disease recurrence. Stable 1.1 cm T1  hyperintense lesion along the right occipital convexity and in the  anterior right lateral ventricle. Limited imaging primarily for the  purposes of stereotactic localization.     CXR 4/26/21  Impression:   Diffuse opacities are now primarily interstitial with less alveolar  opacities, could represent proving impulmonary edema vs sequela from  ARDS.

## 2021-04-30 NOTE — PROGRESS NOTES
[facility]  Palliative Care Daily Progress Note       Recommendations & Counseling     No new recommendations today  Will plan to participate in care conference next Monday at 1PM        Symptoms:   Pain- minimal  - continue acetaminophen as scheduled  - use oxycodone 2.5-5mg PO Q4H prn pain OR dyspnea (see below)      Dyspnea:   - oxycodone 2.5mg TID plus  oxycodone 2.5-5mg PO Q4H prn pain OR dyspnea  - continuing cares per ID, Pulm Med for her recent pneumonia/TRALI.     Anxiety/depression chronic, worsened by current medical situation  - continue steroid taper   - continue fluoxetine, buspirone; both higher doses and serotonergic.  Monitor for potential for serotonin syndrome.  Off bupropion since surgery d/t potential risl of seizure  - continue IV lorazepam 0.5mg IV q4H prn for acute anxiety/panic symptoms.   -seroquel 25mg am, 25mg midday, 50mg PM  - health psychology to continue working with Olga Vieira  - added seroquel as above     Nausea:   - use compazine for nausea first line.  - consider not having prn ondansetron w 5HT risks     Constipation:  -appears stable on multimedication regimen; continue same and monitor with addition of scheduled low dose opiate.     Acute encephalopathy/delirium: multifactorial, concern for preexisting MCI possible; improving  - avoid deliriogenic meds as able   - attention to sleep hygiene, consider melatonin  - continue corticosteroid taper  - do not resume hydroxyzine at discharge.  - pending goals of care, prognosis, consider neuropsych eval post discharge    ----  Per prior visits:     Support: daughter La Nena significant support to patient (she also needs support from palliative and other teams) and two sons involved.  Daughter notes ex= was not welcomed by the patient to be involved in the past and she requests that the patients' wishes be followed.  Familial discord chronically and difficult  relationships between La Nena and her brothers, and mother not wanting to have further interaction w ex-.  La Nena requests having SW assist with communication with brothers in arranging care conferences.     Goals of Care: currently life prolonging and full code.  Olga notes that at this time she would be willing to endure CPR and mechanical ventilation in hopes of recovery, but does not want a tracheostomy or to be on mechanical ventilation for prolonged period.    She is working through what she would hope for if her body is not able to get strong enough for more chemo, and what she would want to do if time were short.  She hopes to recover enough to be with her daughter/family and would like to recover to prior QOL but endorses worry she may not be able to.  Palliative agrees with having care conference Monday           Total time spent was 30 minutes,  >50% of time was spent counseling and/or coordination of care regarding pain, anxiety and air hunger assessment for patient with GBM, respiratory failure.    Ashley Alfredo MD / Palliative Medicine / Text me via [facility].           Assessments          Olga Bailey is a 75 year old female with recent diagnosis of glioblastoma s/p resection 2/21 with prior depression/anxiety, HTN, asthma, GERD who was admitted to [facility] 3/26 and found to have PJP pneumonia, RANJIT, bilateral DVT, R retroperitoneal bleed and a month-long stay.  She then transferred from [facility] to [facility] 4/26 in hopes of receiving possible inpatient chemoradiation with temovar and radiation.  Her functional status has declined precipitously during the lengthy hospitalization.    Today, the patient was seen for:  Dyspnea  Anxiety  Support (pt and daughter)  Goals of care    Prognosis, Goals, or Advance Care Planning was addressed today with: No.  Mood, coping, and/or meaning in the context of serious illness were addressed today: Yes.               Interval History:      Chart review/discussion with unit or clinical team members:      No acute events  Per patient or family/caregivers today:  Calmer today, dsypea and anxiety under better control. No confusion or delirium at time of visit thsi am    She is worried the anxiety will come back but found our medication treatment plan reassuring -- I explained the plan with the scheduled seroquel and prn lorazepam and she said that made her feel better knowing there was a plan    Key Palliative Symptoms:  # Pain severity the last 12 hours: low  # Dyspnea severity the last 12 hours: moderate  # Nausea severity the last 12 hours: low  # Anxiety severity the last 12 hours: moderate    Patient is on opioids: assessed and bowels ok/no needed changes to plan of care today.           Review of Systems:     Besides above, an additional 3 system ROS was reviewed and is unremarkable          Medications:     I have reviewed this patient's medication profile and medications during this hospitalization.    Noted meds:     APAP 975 q8H  Oxycodone 2.5mg tid and 2.5-5mg q 4 hours prn pain or dyspnea (no prn doses)  Prednisone taper  miralax daily - skipping some doses  Quetiapine 25mg am, 25mg midday and 50mg pm  Buspirone 30mg BID  Fluoxetine 60mg  Lactulose 3g tid  linaclotide  Mesalamine suppository daily    Lorazepam: 0.5mg IV TID PRN anxiety  Doxepin prn for sleep - not using  Ondansetron -none  Compazine -none                 Physical Exam:   Vitals were reviewed  Gen: sitting/lying in bed. Appears comfortable   HEENT: NCAT. Conjunctiva clear. Sclera anicteric .  CV: RRR , Peripheral perfusion intact.   Resp: slightly increased work of breathing, but much calmer and less tachypneic than days prior   Abd: soft, nt, nd, +BS   Msk: no gross deformity, no sarcopenia  Skin:  no jaundice  Ext: warm, well perfused.   Neuro: face symmetric. EOM, vision, hearing grossly intact. Speech fluent. Moves all extremities   Mental status/Psych: alert. Oriented.  Asks/answers questions appropriately. Affect is mildly anxious but much more calm and smiling at times             Data Reviewed:     Reviewed recent pertinent imaging, comments:       Reviewed recent labs, comments:   ROUTINE ICU LABS (Last four results)  CMP  Recent Labs   Lab 04/30/21 0458 04/29/21  0742 04/29/21  0545 04/28/21  1601 04/28/21  0535 04/27/21  0548 04/27/21  0548     --  135  --  134  --  132*   POTASSIUM 3.5  --  3.5 5.3 3.8   < > 3.6   CHLORIDE 102  --  101  --  102  --  100   CO2 29  --  30  --  26  --  25   ANIONGAP 6  --  4  --  6  --  8   *  --  110*  --  155*  --  166*   BUN 27  --  29  --  16  --  16   CR 0.39*  --  0.43*  --  0.40*  --  0.36*   GFRESTIMATED >90  --  >90  --  >90  --  >90   GFRESTBLACK >90  --  >90  --  >90  --  >90   ESTIVEN 8.5  --  8.6  --  8.7  --  8.5   MAG 2.1  --  2.4* 2.8* 2.3   < > 2.1   PHOS 2.7 2.6 2.7 6.9* 3.2   < > 3.8   PROTTOTAL 5.0*  --  5.3*  --  5.4*  --  5.2*   ALBUMIN 2.4*  --  2.6*  --  2.5*  --  2.3*   BILITOTAL 0.4  --  0.3  --  0.3  --  0.4   ALKPHOS 117  --  116  --  116  --  126   AST 37  --  34  --  35  --  37   ALT 58*  --  70*  --  82*  --  92*    < > = values in this interval not displayed.     CBC  Recent Labs   Lab 04/30/21 0458 04/29/21 0545 04/28/21  0535 04/27/21  0548   WBC 11.5* 13.8* 13.7* 13.2*   RBC 2.89* 3.12* 3.20* 2.99*   HGB 8.7* 9.2* 9.3* 8.7*   HCT 27.3* 29.5* 29.1* 26.7*   MCV 95 95 91 89   MCH 30.1 29.5 29.1 29.1   MCHC 31.9 31.2* 32.0 32.6   RDW 19.0* 18.6* 17.8* 17.2*    233 219 216     INR  Recent Labs   Lab 04/27/21  0548   INR 1.02     Arterial Blood Gas  Recent Labs   Lab 04/28/21  0811 04/27/21  1752 04/27/21  1107 04/26/21  1605 04/25/21  1035   PH 7.45  --   --   --  7.42   PCO2 36  --   --   --  38   PO2 90  --   --   --  92   HCO3 25  --   --   --  24   O2PER 50 4L 0 3 liters   --

## 2021-04-30 NOTE — PROGRESS NOTES
"BRIEF SOCIAL WORK NOTE:    Care conference scheduled Monday 5/3 at 1300 in Pt's room.     Pt's dtr La Nena will attend in person. Pt's sons (Dario and Sudheer) will attend via iPad.  has provided instructions to both Dario and Sudheer for how to attend the care conference via iPad.     Primary Team, Palliative Team (Dr. Alfredo), and [facility] (TERA Lerma) are all confirmed to be attending. [facility] plans to update [facility] directly and they may also attend.     iPad Instructions:  Pt's family can enter the call either by audio only, or audio/video.   -For Audio, family will call:              -Phone #: [phone]              -Enter Patient ID #: [id number]              -Enter PIN: [id number]  -For Audio/Video, family will initiate a video session from a video capable computer, tablet or smart phone:              -Family will use search engine and go to \"[email or web address]\"              -Press \"Join Meeting\"              -Enter Patient ID #: [id number]              -Enter PIN: [id number]              -Review information to ensure they are connecting to the appropriate patient              -Enter their name              -Click \"Join Meeting\"    CAMRON to continue to follow and assist with discharge plan as appropriate.     MACARIO Sorto, [facility] Hospital[facility]  6B Intermediate Care Unit   [facility]  Phone: [phone]  Pager: [phone]    "

## 2021-05-01 ENCOUNTER — APPOINTMENT (OUTPATIENT)
Dept: OCCUPATIONAL THERAPY | Facility: CLINIC | Age: 76
DRG: 177 | End: 2021-05-01
Attending: STUDENT IN AN ORGANIZED HEALTH CARE EDUCATION/TRAINING PROGRAM
Payer: MEDICARE

## 2021-05-01 LAB
ALBUMIN SERPL-MCNC: 2.4 G/DL (ref 3.4–5)
ALP SERPL-CCNC: 127 U/L (ref 40–150)
ALT SERPL W P-5'-P-CCNC: 55 U/L (ref 0–50)
ANION GAP SERPL CALCULATED.3IONS-SCNC: 4 MMOL/L (ref 3–14)
AST SERPL W P-5'-P-CCNC: 37 U/L (ref 0–45)
BILIRUB SERPL-MCNC: 0.4 MG/DL (ref 0.2–1.3)
BUN SERPL-MCNC: 34 MG/DL (ref 7–30)
CALCIUM SERPL-MCNC: 8.4 MG/DL (ref 8.5–10.1)
CHLORIDE SERPL-SCNC: 103 MMOL/L (ref 94–109)
CO2 SERPL-SCNC: 31 MMOL/L (ref 20–32)
CREAT SERPL-MCNC: 0.36 MG/DL (ref 0.52–1.04)
ERYTHROCYTE [DISTWIDTH] IN BLOOD BY AUTOMATED COUNT: 19.5 % (ref 10–15)
GFR SERPL CREATININE-BSD FRML MDRD: >90 ML/MIN/{1.73_M2}
GLUCOSE BLDC GLUCOMTR-MCNC: 127 MG/DL (ref 70–99)
GLUCOSE BLDC GLUCOMTR-MCNC: 133 MG/DL (ref 70–99)
GLUCOSE BLDC GLUCOMTR-MCNC: 174 MG/DL (ref 70–99)
GLUCOSE BLDC GLUCOMTR-MCNC: 177 MG/DL (ref 70–99)
GLUCOSE SERPL-MCNC: 116 MG/DL (ref 70–99)
HCT VFR BLD AUTO: 28.9 % (ref 35–47)
HGB BLD-MCNC: 9 G/DL (ref 11.7–15.7)
LACTATE BLD-SCNC: 1.3 MMOL/L (ref 0.7–2)
MAGNESIUM SERPL-MCNC: 2.1 MG/DL (ref 1.6–2.3)
MCH RBC QN AUTO: 29.7 PG (ref 26.5–33)
MCHC RBC AUTO-ENTMCNC: 31.1 G/DL (ref 31.5–36.5)
MCV RBC AUTO: 95 FL (ref 78–100)
PHOSPHATE SERPL-MCNC: 3 MG/DL (ref 2.5–4.5)
PLATELET # BLD AUTO: 204 10E9/L (ref 150–450)
POTASSIUM SERPL-SCNC: 3.4 MMOL/L (ref 3.4–5.3)
POTASSIUM SERPL-SCNC: 4.7 MMOL/L (ref 3.4–5.3)
PROT SERPL-MCNC: 5.2 G/DL (ref 6.8–8.8)
RBC # BLD AUTO: 3.03 10E12/L (ref 3.8–5.2)
SODIUM SERPL-SCNC: 138 MMOL/L (ref 133–144)
WBC # BLD AUTO: 10.1 10E9/L (ref 4–11)

## 2021-05-01 PROCEDURE — 250N000009 HC RX 250: Performed by: HOSPITALIST

## 2021-05-01 PROCEDURE — 250N000011 HC RX IP 250 OP 636

## 2021-05-01 PROCEDURE — 84100 ASSAY OF PHOSPHORUS: CPT | Performed by: STUDENT IN AN ORGANIZED HEALTH CARE EDUCATION/TRAINING PROGRAM

## 2021-05-01 PROCEDURE — 83605 ASSAY OF LACTIC ACID: CPT | Performed by: INTERNAL MEDICINE

## 2021-05-01 PROCEDURE — 250N000011 HC RX IP 250 OP 636: Performed by: STUDENT IN AN ORGANIZED HEALTH CARE EDUCATION/TRAINING PROGRAM

## 2021-05-01 PROCEDURE — 999N001017 HC STATISTIC GLUCOSE BY METER IP

## 2021-05-01 PROCEDURE — 99232 SBSQ HOSP IP/OBS MODERATE 35: CPT | Mod: GC | Performed by: HOSPITALIST

## 2021-05-01 PROCEDURE — 258N000003 HC RX IP 258 OP 636: Performed by: STUDENT IN AN ORGANIZED HEALTH CARE EDUCATION/TRAINING PROGRAM

## 2021-05-01 PROCEDURE — 83735 ASSAY OF MAGNESIUM: CPT | Performed by: STUDENT IN AN ORGANIZED HEALTH CARE EDUCATION/TRAINING PROGRAM

## 2021-05-01 PROCEDURE — 36415 COLL VENOUS BLD VENIPUNCTURE: CPT | Performed by: STUDENT IN AN ORGANIZED HEALTH CARE EDUCATION/TRAINING PROGRAM

## 2021-05-01 PROCEDURE — 250N000012 HC RX MED GY IP 250 OP 636 PS 637: Performed by: STUDENT IN AN ORGANIZED HEALTH CARE EDUCATION/TRAINING PROGRAM

## 2021-05-01 PROCEDURE — 85027 COMPLETE CBC AUTOMATED: CPT | Performed by: STUDENT IN AN ORGANIZED HEALTH CARE EDUCATION/TRAINING PROGRAM

## 2021-05-01 PROCEDURE — 84132 ASSAY OF SERUM POTASSIUM: CPT | Performed by: HOSPITALIST

## 2021-05-01 PROCEDURE — 97535 SELF CARE MNGMENT TRAINING: CPT | Mod: GO | Performed by: OCCUPATIONAL THERAPIST

## 2021-05-01 PROCEDURE — 999N000007 HC SITE CHECK

## 2021-05-01 PROCEDURE — 97530 THERAPEUTIC ACTIVITIES: CPT | Mod: GO | Performed by: OCCUPATIONAL THERAPIST

## 2021-05-01 PROCEDURE — 250N000013 HC RX MED GY IP 250 OP 250 PS 637: Performed by: STUDENT IN AN ORGANIZED HEALTH CARE EDUCATION/TRAINING PROGRAM

## 2021-05-01 PROCEDURE — 999N000044 HC STATISTIC CVC DRESSING CHANGE

## 2021-05-01 PROCEDURE — 36592 COLLECT BLOOD FROM PICC: CPT | Performed by: HOSPITALIST

## 2021-05-01 PROCEDURE — C9113 INJ PANTOPRAZOLE SODIUM, VIA: HCPCS

## 2021-05-01 PROCEDURE — 36592 COLLECT BLOOD FROM PICC: CPT | Performed by: INTERNAL MEDICINE

## 2021-05-01 PROCEDURE — 250N000011 HC RX IP 250 OP 636: Performed by: HOSPITALIST

## 2021-05-01 PROCEDURE — 250N000013 HC RX MED GY IP 250 OP 250 PS 637: Performed by: INTERNAL MEDICINE

## 2021-05-01 PROCEDURE — 80053 COMPREHEN METABOLIC PANEL: CPT | Performed by: STUDENT IN AN ORGANIZED HEALTH CARE EDUCATION/TRAINING PROGRAM

## 2021-05-01 PROCEDURE — 120N000005 HC R&B MS OVERFLOW UMMC

## 2021-05-01 PROCEDURE — 999N000157 HC STATISTIC RCP TIME EA 10 MIN

## 2021-05-01 PROCEDURE — 250N000011 HC RX IP 250 OP 636: Performed by: INTERNAL MEDICINE

## 2021-05-01 RX ORDER — POTASSIUM CHLORIDE 29.8 MG/ML
20 INJECTION INTRAVENOUS
Status: COMPLETED | OUTPATIENT
Start: 2021-05-01 | End: 2021-05-01

## 2021-05-01 RX ORDER — LORAZEPAM 0.5 MG/1
0.5 TABLET ORAL EVERY 4 HOURS PRN
Status: DISCONTINUED | OUTPATIENT
Start: 2021-05-01 | End: 2021-05-01

## 2021-05-01 RX ORDER — DOXEPIN HYDROCHLORIDE 10 MG/ML
3 SOLUTION ORAL AT BEDTIME
Status: DISCONTINUED | OUTPATIENT
Start: 2021-05-01 | End: 2021-05-15 | Stop reason: HOSPADM

## 2021-05-01 RX ORDER — LORAZEPAM 2 MG/ML
0.5 INJECTION INTRAMUSCULAR EVERY 4 HOURS PRN
Status: DISCONTINUED | OUTPATIENT
Start: 2021-05-01 | End: 2021-05-10

## 2021-05-01 RX ADMIN — BUSPIRONE HYDROCHLORIDE 30 MG: 15 TABLET ORAL at 09:09

## 2021-05-01 RX ADMIN — ACYCLOVIR: 50 OINTMENT TOPICAL at 21:56

## 2021-05-01 RX ADMIN — LORAZEPAM 0.5 MG: 2 INJECTION INTRAMUSCULAR; INTRAVENOUS at 16:24

## 2021-05-01 RX ADMIN — ACETAMINOPHEN 975 MG: 325 TABLET, FILM COATED ORAL at 16:25

## 2021-05-01 RX ADMIN — LACTULOSE 3 G: 20 SOLUTION ORAL at 17:43

## 2021-05-01 RX ADMIN — INSULIN ASPART 1 UNITS: 100 INJECTION, SOLUTION INTRAVENOUS; SUBCUTANEOUS at 13:03

## 2021-05-01 RX ADMIN — PANTOPRAZOLE SODIUM 40 MG: 40 INJECTION, POWDER, FOR SOLUTION INTRAVENOUS at 09:10

## 2021-05-01 RX ADMIN — LORAZEPAM 0.5 MG: 2 INJECTION INTRAMUSCULAR; INTRAVENOUS at 09:10

## 2021-05-01 RX ADMIN — Medication: at 19:45

## 2021-05-01 RX ADMIN — Medication 2.5 MG: at 14:28

## 2021-05-01 RX ADMIN — POTASSIUM CHLORIDE 20 MEQ: 29.8 INJECTION, SOLUTION INTRAVENOUS at 09:07

## 2021-05-01 RX ADMIN — AMLODIPINE BESYLATE 10 MG: 10 TABLET ORAL at 09:08

## 2021-05-01 RX ADMIN — POTASSIUM CHLORIDE 20 MEQ: 29.8 INJECTION, SOLUTION INTRAVENOUS at 09:19

## 2021-05-01 RX ADMIN — ACYCLOVIR: 50 OINTMENT TOPICAL at 05:50

## 2021-05-01 RX ADMIN — QUETIAPINE 25 MG: 25 TABLET ORAL at 17:43

## 2021-05-01 RX ADMIN — Medication 2.5 MG: at 09:09

## 2021-05-01 RX ADMIN — ACYCLOVIR: 50 OINTMENT TOPICAL at 17:43

## 2021-05-01 RX ADMIN — ACETAMINOPHEN 975 MG: 325 TABLET, FILM COATED ORAL at 09:09

## 2021-05-01 RX ADMIN — ENOXAPARIN SODIUM 40 MG: 100 INJECTION SUBCUTANEOUS at 16:25

## 2021-05-01 RX ADMIN — LINACLOTIDE 290 MCG: 145 CAPSULE, GELATIN COATED ORAL at 09:10

## 2021-05-01 RX ADMIN — LACTULOSE 3 G: 20 SOLUTION ORAL at 09:09

## 2021-05-01 RX ADMIN — Medication 2.5 MG: at 19:44

## 2021-05-01 RX ADMIN — QUETIAPINE 25 MG: 25 TABLET ORAL at 09:09

## 2021-05-01 RX ADMIN — INSULIN ASPART 1 UNITS: 100 INJECTION, SOLUTION INTRAVENOUS; SUBCUTANEOUS at 16:52

## 2021-05-01 RX ADMIN — FLUOXETINE HYDROCHLORIDE 60 MG: 40 CAPSULE ORAL at 09:09

## 2021-05-01 RX ADMIN — LORAZEPAM 0.5 MG: 0.5 TABLET ORAL at 14:45

## 2021-05-01 RX ADMIN — LEVETIRACETAM 250 MG: 100 INJECTION, SOLUTION, CONCENTRATE INTRAVENOUS at 09:22

## 2021-05-01 RX ADMIN — SULFAMETHOXAZOLE AND TRIMETHOPRIM 80 MG: 80; 16 INJECTION, SOLUTION, CONCENTRATE INTRAVENOUS at 09:56

## 2021-05-01 RX ADMIN — BUSPIRONE HYDROCHLORIDE 30 MG: 15 TABLET ORAL at 19:43

## 2021-05-01 RX ADMIN — LEVETIRACETAM 250 MG: 100 INJECTION, SOLUTION, CONCENTRATE INTRAVENOUS at 21:59

## 2021-05-01 RX ADMIN — MESALAMINE 1000 MG: 1000 SUPPOSITORY RECTAL at 22:56

## 2021-05-01 RX ADMIN — PANTOPRAZOLE SODIUM 40 MG: 40 INJECTION, POWDER, FOR SOLUTION INTRAVENOUS at 17:43

## 2021-05-01 RX ADMIN — I.V. FAT EMULSION 250 ML: 20 EMULSION INTRAVENOUS at 19:45

## 2021-05-01 RX ADMIN — PREDNISONE 40 MG: 20 TABLET ORAL at 09:18

## 2021-05-01 ASSESSMENT — ACTIVITIES OF DAILY LIVING (ADL)
ADLS_ACUITY_SCORE: 19
ADLS_ACUITY_SCORE: 19
ADLS_ACUITY_SCORE: 18
ADLS_ACUITY_SCORE: 19
ADLS_ACUITY_SCORE: 18
ADLS_ACUITY_SCORE: 19

## 2021-05-01 NOTE — PLAN OF CARE
Neuro: A&Ox4.   Cardiac: SR. To ST VSS.   Respiratory: Sating 92-93 on 3L NC  GI/: Adequate urine output in purewick no BM this shift  Diet/appetite: Tolerating DD1 diet. Eating well. Nectar thick  Activity: Lift, up to chair for 2 hours today, T/R in bed, pulsate   Pain: At acceptable level on current regimen.   Skin: No new deficits noted.  LDA's: R PICC with TPN/Lipids    Plan: potassium replaced, patient anxious intermittently, transfer orders in place

## 2021-05-01 NOTE — PROGRESS NOTES
[facility]    Progress Note - [place] 3 Service        Date of Admission:  4/26/2021    Assessment & Plan   Olga Bailey is a 75 y.o. F w/ GBM s/p resection (Feb 2021), depression/anxiety, HTN, asthma, and GERD, who was transferred to [facility] for consideration of inpt rad/onc treatment while she continues to be treated for complex presentation of acute hypoxic respiratory failure (due to possible PJP pneumonia and/or TRALI), 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    Today:  - diuretic holiday  - care conference Monday 5/3  - switching Ativan to PO  - transfer off intermediate floor     Acute hypoxic respiratory failure   Bilateral Pneumonia; presumed PJP s/p ABx treatment  Concern for transfusion-related acute lung injury (TRALI)  Concern for R hemidiaphragm paralysis  Dyspneic/hypoxic in late March for admission at OSH. Initially treated with ceftri/doxy. ID switched to course of Zosyn/doxy with Bactrim treatment dosing for PJP (had been on steroids after admission for glioblastoma resection; Fungitell positive but Galactomannan negative). Unable to get adequate sputum samples for diagnosis of PJP. Improved with the Bactrim until several days after RP bleed (see below). W/u for PE, COVID, and respiratory viral panel was negative. Procal low. Bilateral lung opacities present. Concern for ongoing PJP infection (Bactrim was re-started), inflammatory lung condition (increased steroids), volume overload (diuresed for several days with IV Lasix). Imaging shows R hemidiaphragm elevation (concerning for paralysis?), and opacities appear to be improving as are oxygen requirements (weaned from high-flow to Oxymask 4-7L now). Pulmonology at OSH was considering bronch if no improvement, though pulm and ID here find no benefit to pursuing this now with her improvement. Has had several RRTs for tachypnea, appear primarily related to anxiety.  Quickly improved with PRN anxiety medication and Bipap. VBGs normal (although had been on BiPAP ~15 minutes), EKG, CXR, not concerning. No improvement in O2 needs after diuresis here at [facility].  - Pulmonology consulted, following peripherally  - Working on anxiety as below   - diuretic holiday  - pulm, ID consults   - wean steroids: from methylpred IV 62.5 mg BID -> prednisone 60 mg x 2 d (starting 4/28) -> pred 40 mg x 2 d -> pred 30 mg x 1 d -> pred 20 mg x 1 d -> pred 10 mg x 1 d   - steroid taper ok from onc perspective   - Will monitor for signs of adrenal insufficiency     - both agree on holding off on bronch (this is also family/pt's preference)  - PJP ppx: Bactrim single strength qday x 1.5 mo after steroids end  - Palliative care consulted for assistance in symptom management; appreciate recs   - PJP stain, PCR; conventional sputum culture ordered if patient can produce sputum   - f/u blood cultures - NGTD  - wean O2 as able to keep sats > 92%  - Bipap when sleeping and PRN for comfort  - Scheduled oxycodone 2.5 mg TID (for ongoing dyspnea)  - SLP consulted - FLD and nectar thick liquids   >Will touch base to consider if appropriate for pureed diet (4/28 SLP note says residual puree seen on pharynx on VSS)    - Would not recommend CT PE increased O2 requirements and tachycardia despite anxiety interventions. Anticoagulation contraindicated in setting of recent hemorraghic, DW hematology and plan would be to continue prophylactic lovenox dosing regardless. Would contact pulmonology however. =     Glioblastoma Multiforme  Left frontoparietal brain glioblastoma status post resection 2/23/2021. Seen by radiation oncology 3/24 recommended XRT and chemo radiation. However subsequently admitted for resp failure. MRI brain here signs of possible disease recurrence. Hem/onc concerned that she might not be a candidate for chemotherapy or radiation currently due to poor performance status.  -Rad/onc following;  appreciate recs   -Patient being presented at CNS tumor conference 5/3   -Tentatively planning to start radiation course will inpatient 5/4  -Heme/onc consulted, appreciate recs   - Patient not strong enough for chemo   - would consider temozolomide at late date pending clinical course (needs to improve performance status)  - Anticipate care conference 5/3/21    Bilateral lower extremity DVT  Right retroperitoneal hematoma   On 3/29 at outside hospital patient's O2 needs increased, duplex ultrasound revealed bilateral lower extremity DVT. CT PE negative for embolism.  Treated with IV heparin and transitioned to Lovenox 70 mg.  On 4/14 this was complicated by retroperitoneal bleed, requiring 4 units of packed red blood cells.  Lovenox was held, and IVC filter was placed on 4/16. Vascular surgery was involved, and a CT abdomen was stable bleed so no surgical intervention was required.  Eventually resumed on prophylactic 40mg of Lovenox twice daily.  -Continue 40 mg Lovenox qday  -hem/onc consulted; appreciate recs   - pain plan:              - Tylenol 975 mg q8h brianna              - oxycodone 2.5-5 mg q4h PRN for pain              - discontinued Dilaudid to avoid delirium  - Asymmetric foot swelling , 4/30/2021. Will not obtain US given there are no changes we would make to the anticoagulation plan; had discussed with heme on 4/29/21     Major depressive disorder, recurrent  Anxiety  Follows with psychiatry and psychology as outpatint.  Saw health psychology during admission for GBM, made plan for outpatient follow-up and med adjustment,  but was unable.  Given prolonged admission patient could benefit from inpatient consultation again and patient requesting this.  - Psychiatry consulted, appreciate recs  - Palliative recs appreciated;  - Seroquel 25 mg BID + 50 mg at bedtime  - Ativan 0.5 mg TID PRN --> switched to PO per RN request  - Scheduled doxepin PRN at night for sleep  - c/t PTA Buspar  - PTA prozac (dose  reduced from 80 to 60 mg)  - Health psychology consult; appreciate recs     Ileus  Began after bleed at outside hospital 4/14; was on TPN for nutrition.  GI was consulted for assistance with bowel management.  With escalating bowel regimen finally had small BM 4/25 after tap water enema, had some liquid stool 4/27. AXR shows non-obstructive bowel gas pattern.  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 Urine sodium 50 while her sodium was 128, certainly consistent with this. Neurologic etiology with her GBM resection likely. Now trending up.   - trend Na; consider fluid restriction if any concern for worsening Na     Shock Liver, improving  LFTs sharply up after RP bleed at OSH, [place] due to shock liver. LFT's have steadily trended down since. Only AST remains elevated and trending down.   -Recheck CMP in am    Hyperglycemia   Steroid induced. Intermittently needed insulin at OSH.   -Low sliding scale insulin  -Gluc checks     Leukocytosis  Ddx is infection vs steroid-induced.  - trend CBC     Hypertension  Sinus tachycardia  Started on metoprolol at OSH for sinus tachycardia.  - c/t PTA amlodipine 10 mg qday  - hold BB    Chronic medical problems:  - albuterol PRN  - BIPAP at bedtime (on CPAP at home) and for comfort  - GERD: back to PTA PO PPI     Diet: Snacks/Supplements Adult: Ensure Enlive; Between Meals  parenteral nutrition - ADULT compounded formula  Dysphagia Diet Level 1 Pureed Nectar Thickened Liquids (pre-thickened or use instant food thickener)    Fluids: TPN  Lines: PICC line  DVT Prophylaxis: Enoxaparin (Lovenox) SQ  Martino Catheter: not present  Code Status: Full Code         Disposition Plan   Expected discharge: > 7 days,  recommended to transitional care unit once respiratory status improves, plan for rehab and chemo/radiation in place .  Entered: Chris Cheek MD 05/01/2021, 8:04 AM     The patient's care was discussed with the Attending Physician, Dr. Tomlin.    Gigi Cheek MD  PGY-2, Internal Medicine  [facility]  ______________________________________________________________________    Interval History    NAEO. This AM, drowsy but awakens to answer basic questions. Denies chest pain, abdominal pain or subjective SOB.    4 Point ROS obtained and negative except as noted above.     Data reviewed today: I reviewed all medications, new labs and imaging results over the last 24 hours. I personally reviewed   Physical Exam   Vital Signs: Temp: 97.6  F (36.4  C) Temp src: Oral BP: 136/75 Pulse: 102   Resp: 22 SpO2: 97 % O2 Device: Nasal cannula with humidification Oxygen Delivery: 5 LPM  Weight: 163 lbs 5.77 oz   General Appearance: lying down in bed, drowsy but able to awaken. Visibly anxious upon awakening.  Eyes: no scleral icterus, EOMI  HEENT: neck supple, normocephalic, NC in place. Dry appearing mucous membranes   Respiratory: mild increased WOB 4L NC, L>R fine crackles in lateral lung fields  Cardiovascular: Tachycardia, regular rhythm, normal S1/S2, no murmurs appreciated, intact distal pulses  GI: soft, non-tender, non-distended  Skin: scattered bruises over upper exposed upper extremities, no rash noted  Musculoskeletal: Non-pitting edema left foot and distal shin; o edema of right foot.  Neurologic: Moves all extremities spontaneously, follows commands.   Psychiatric: Anxious appearing     Data   Recent Labs   Lab 05/01/21  0537 04/30/21  0458 04/29/21  0545 04/28/21  0831 04/28/21  0831 04/28/21  0535 04/28/21  0026 04/27/21  0548 04/27/21  0548   WBC 10.1 11.5* 13.8*  --   --  13.7*  --   --  13.2*   HGB 9.0* 8.7* 9.2*  --   --  9.3*  --   --  8.7*   MCV 95 95 95  --   --  91  --   --  89     208 233  --   --  219  --   --  216   INR  --   --   --   --   --   --   --   --  1.02    137 135  --   --  134  --   --  132*   POTASSIUM 3.4 3.5 3.5   < >  --  3.8  --    < > 3.6   CHLORIDE 103 102 101  --   --  102  --   --  100   CO2 31 29 30  --   --  26  --   --  25   BUN 34* 27 29  --   --  16  --   --  16   CR 0.36* 0.39* 0.43*  --   --  0.40*  --   --  0.36*   ANIONGAP 4 6 4  --   --  6  --   --  8   ESTIVEN 8.4* 8.5 8.6  --   --  8.7  --   --  8.5   * 120* 110*  --   --  155*  --   --  166*   ALBUMIN 2.4* 2.4* 2.6*  --   --  2.5*  --   --  2.3*   PROTTOTAL 5.2* 5.0* 5.3*  --   --  5.4*  --   --  5.2*   BILITOTAL 0.4 0.4 0.3  --   --  0.3  --   --  0.4   ALKPHOS 127 117 116  --   --  116  --   --  126   ALT 55* 58* 70*  --   --  82*  --   --  92*   AST 37 37 34  --   --  35  --   --  37   TROPI  --   --   --   --  0.044 0.041 0.048*   < >  --     < > = values in this interval not displayed.     No results found for this or any previous visit (from the past 24 hour(s)).

## 2021-05-01 NOTE — PLAN OF CARE
Neuro: A&Ox4. Intermittently forgetful and disoriented. PRN Lorazepam given x2  Cardiac: SR. VSS. Afebrile   Respiratory: Sating >92 on 6L NC.  GI/: Adequate urine output via purewick. Small BM X1  Diet/appetite: Tolerating Regular diet with nectar thick liquids. Eating well.  Activity:  Assist of 2 Lift, up to chair and repos  Pain: At acceptable level on current regimen. Scheduled oxy and tylenol given  Skin: No new deficits noted.  LDA's: PICC- TPN, Lipids, TKO    Plan: Continue with POC. Notify primary team with changes.    Electrolytes monitored, K to be replaced on day shift

## 2021-05-02 ENCOUNTER — APPOINTMENT (OUTPATIENT)
Dept: GENERAL RADIOLOGY | Facility: CLINIC | Age: 76
DRG: 177 | End: 2021-05-02
Attending: STUDENT IN AN ORGANIZED HEALTH CARE EDUCATION/TRAINING PROGRAM
Payer: MEDICARE

## 2021-05-02 ENCOUNTER — APPOINTMENT (OUTPATIENT)
Dept: CT IMAGING | Facility: CLINIC | Age: 76
DRG: 177 | End: 2021-05-02
Attending: STUDENT IN AN ORGANIZED HEALTH CARE EDUCATION/TRAINING PROGRAM
Payer: MEDICARE

## 2021-05-02 LAB
ALBUMIN SERPL-MCNC: 2.2 G/DL (ref 3.4–5)
ALP SERPL-CCNC: 116 U/L (ref 40–150)
ALT SERPL W P-5'-P-CCNC: 50 U/L (ref 0–50)
ANION GAP SERPL CALCULATED.3IONS-SCNC: 6 MMOL/L (ref 3–14)
AST SERPL W P-5'-P-CCNC: 32 U/L (ref 0–45)
BACTERIA SPEC CULT: NO GROWTH
BACTERIA SPEC CULT: NO GROWTH
BASE EXCESS BLDV CALC-SCNC: 4.8 MMOL/L
BILIRUB SERPL-MCNC: 0.4 MG/DL (ref 0.2–1.3)
BUN SERPL-MCNC: 32 MG/DL (ref 7–30)
CALCIUM SERPL-MCNC: 8.5 MG/DL (ref 8.5–10.1)
CHLORIDE SERPL-SCNC: 103 MMOL/L (ref 94–109)
CO2 SERPL-SCNC: 30 MMOL/L (ref 20–32)
CREAT SERPL-MCNC: 0.37 MG/DL (ref 0.52–1.04)
ERYTHROCYTE [DISTWIDTH] IN BLOOD BY AUTOMATED COUNT: 19.9 % (ref 10–15)
GFR SERPL CREATININE-BSD FRML MDRD: >90 ML/MIN/{1.73_M2}
GLUCOSE BLDC GLUCOMTR-MCNC: 122 MG/DL (ref 70–99)
GLUCOSE BLDC GLUCOMTR-MCNC: 126 MG/DL (ref 70–99)
GLUCOSE BLDC GLUCOMTR-MCNC: 162 MG/DL (ref 70–99)
GLUCOSE BLDC GLUCOMTR-MCNC: 178 MG/DL (ref 70–99)
GLUCOSE BLDC GLUCOMTR-MCNC: 184 MG/DL (ref 70–99)
GLUCOSE SERPL-MCNC: 128 MG/DL (ref 70–99)
HCO3 BLDV-SCNC: 30 MMOL/L (ref 21–28)
HCT VFR BLD AUTO: 28.3 % (ref 35–47)
HGB BLD-MCNC: 8.6 G/DL (ref 11.7–15.7)
LACTATE BLD-SCNC: 1.6 MMOL/L (ref 0.7–2)
MAGNESIUM SERPL-MCNC: 2.2 MG/DL (ref 1.6–2.3)
MCH RBC QN AUTO: 29 PG (ref 26.5–33)
MCHC RBC AUTO-ENTMCNC: 30.4 G/DL (ref 31.5–36.5)
MCV RBC AUTO: 95 FL (ref 78–100)
O2/TOTAL GAS SETTING VFR VENT: ABNORMAL %
PCO2 BLDV: 48 MM HG (ref 40–50)
PH BLDV: 7.41 PH (ref 7.32–7.43)
PHOSPHATE SERPL-MCNC: 2.6 MG/DL (ref 2.5–4.5)
PLATELET # BLD AUTO: 200 10E9/L (ref 150–450)
PO2 BLDV: 36 MM HG (ref 25–47)
POTASSIUM SERPL-SCNC: 3.8 MMOL/L (ref 3.4–5.3)
PROCALCITONIN SERPL-MCNC: 0.1 NG/ML
PROT SERPL-MCNC: 5.2 G/DL (ref 6.8–8.8)
RBC # BLD AUTO: 2.97 10E12/L (ref 3.8–5.2)
SODIUM SERPL-SCNC: 139 MMOL/L (ref 133–144)
SPECIMEN SOURCE: NORMAL
SPECIMEN SOURCE: NORMAL
TROPONIN I SERPL-MCNC: 0.04 UG/L (ref 0–0.04)
WBC # BLD AUTO: 10.4 10E9/L (ref 4–11)

## 2021-05-02 PROCEDURE — 999N000157 HC STATISTIC RCP TIME EA 10 MIN

## 2021-05-02 PROCEDURE — 99233 SBSQ HOSP IP/OBS HIGH 50: CPT | Mod: GC | Performed by: HOSPITALIST

## 2021-05-02 PROCEDURE — 84145 PROCALCITONIN (PCT): CPT | Performed by: STUDENT IN AN ORGANIZED HEALTH CARE EDUCATION/TRAINING PROGRAM

## 2021-05-02 PROCEDURE — 250N000013 HC RX MED GY IP 250 OP 250 PS 637: Performed by: INTERNAL MEDICINE

## 2021-05-02 PROCEDURE — 94660 CPAP INITIATION&MGMT: CPT

## 2021-05-02 PROCEDURE — 250N000011 HC RX IP 250 OP 636: Performed by: STUDENT IN AN ORGANIZED HEALTH CARE EDUCATION/TRAINING PROGRAM

## 2021-05-02 PROCEDURE — 83605 ASSAY OF LACTIC ACID: CPT | Performed by: STUDENT IN AN ORGANIZED HEALTH CARE EDUCATION/TRAINING PROGRAM

## 2021-05-02 PROCEDURE — 80053 COMPREHEN METABOLIC PANEL: CPT | Performed by: STUDENT IN AN ORGANIZED HEALTH CARE EDUCATION/TRAINING PROGRAM

## 2021-05-02 PROCEDURE — 999N001017 HC STATISTIC GLUCOSE BY METER IP

## 2021-05-02 PROCEDURE — 71045 X-RAY EXAM CHEST 1 VIEW: CPT | Mod: 26 | Performed by: STUDENT IN AN ORGANIZED HEALTH CARE EDUCATION/TRAINING PROGRAM

## 2021-05-02 PROCEDURE — 258N000003 HC RX IP 258 OP 636: Performed by: STUDENT IN AN ORGANIZED HEALTH CARE EDUCATION/TRAINING PROGRAM

## 2021-05-02 PROCEDURE — 71275 CT ANGIOGRAPHY CHEST: CPT | Mod: ME

## 2021-05-02 PROCEDURE — 71045 X-RAY EXAM CHEST 1 VIEW: CPT

## 2021-05-02 PROCEDURE — 250N000013 HC RX MED GY IP 250 OP 250 PS 637: Performed by: STUDENT IN AN ORGANIZED HEALTH CARE EDUCATION/TRAINING PROGRAM

## 2021-05-02 PROCEDURE — 70450 CT HEAD/BRAIN W/O DYE: CPT | Mod: ME

## 2021-05-02 PROCEDURE — 250N000011 HC RX IP 250 OP 636: Performed by: PHYSICIAN ASSISTANT

## 2021-05-02 PROCEDURE — 99291 CRITICAL CARE FIRST HOUR: CPT | Performed by: PHYSICIAN ASSISTANT

## 2021-05-02 PROCEDURE — 70450 CT HEAD/BRAIN W/O DYE: CPT | Mod: 26 | Performed by: RADIOLOGY

## 2021-05-02 PROCEDURE — 85027 COMPLETE CBC AUTOMATED: CPT | Performed by: STUDENT IN AN ORGANIZED HEALTH CARE EDUCATION/TRAINING PROGRAM

## 2021-05-02 PROCEDURE — 71275 CT ANGIOGRAPHY CHEST: CPT | Mod: 26 | Performed by: RADIOLOGY

## 2021-05-02 PROCEDURE — 120N000003 HC R&B IMCU UMMC

## 2021-05-02 PROCEDURE — 36592 COLLECT BLOOD FROM PICC: CPT | Performed by: STUDENT IN AN ORGANIZED HEALTH CARE EDUCATION/TRAINING PROGRAM

## 2021-05-02 PROCEDURE — 84100 ASSAY OF PHOSPHORUS: CPT | Performed by: STUDENT IN AN ORGANIZED HEALTH CARE EDUCATION/TRAINING PROGRAM

## 2021-05-02 PROCEDURE — 250N000012 HC RX MED GY IP 250 OP 636 PS 637: Performed by: STUDENT IN AN ORGANIZED HEALTH CARE EDUCATION/TRAINING PROGRAM

## 2021-05-02 PROCEDURE — 93010 ELECTROCARDIOGRAM REPORT: CPT | Performed by: INTERNAL MEDICINE

## 2021-05-02 PROCEDURE — 84484 ASSAY OF TROPONIN QUANT: CPT | Performed by: STUDENT IN AN ORGANIZED HEALTH CARE EDUCATION/TRAINING PROGRAM

## 2021-05-02 PROCEDURE — G1004 CDSM NDSC: HCPCS | Mod: GC | Performed by: RADIOLOGY

## 2021-05-02 PROCEDURE — 93005 ELECTROCARDIOGRAM TRACING: CPT

## 2021-05-02 PROCEDURE — 250N000009 HC RX 250: Performed by: HOSPITALIST

## 2021-05-02 PROCEDURE — C9113 INJ PANTOPRAZOLE SODIUM, VIA: HCPCS

## 2021-05-02 PROCEDURE — 83735 ASSAY OF MAGNESIUM: CPT | Performed by: STUDENT IN AN ORGANIZED HEALTH CARE EDUCATION/TRAINING PROGRAM

## 2021-05-02 PROCEDURE — 82803 BLOOD GASES ANY COMBINATION: CPT | Performed by: STUDENT IN AN ORGANIZED HEALTH CARE EDUCATION/TRAINING PROGRAM

## 2021-05-02 PROCEDURE — 250N000011 HC RX IP 250 OP 636

## 2021-05-02 RX ORDER — FUROSEMIDE 10 MG/ML
20 INJECTION INTRAMUSCULAR; INTRAVENOUS ONCE
Status: COMPLETED | OUTPATIENT
Start: 2021-05-02 | End: 2021-05-02

## 2021-05-02 RX ORDER — FUROSEMIDE 10 MG/ML
20 INJECTION INTRAMUSCULAR; INTRAVENOUS DAILY
Status: DISCONTINUED | OUTPATIENT
Start: 2021-05-03 | End: 2021-05-04

## 2021-05-02 RX ORDER — LORAZEPAM 2 MG/ML
0.5 INJECTION INTRAMUSCULAR ONCE
Status: COMPLETED | OUTPATIENT
Start: 2021-05-02 | End: 2021-05-02

## 2021-05-02 RX ORDER — PREDNISONE 10 MG/1
10 TABLET ORAL DAILY
Status: DISCONTINUED | OUTPATIENT
Start: 2021-05-05 | End: 2021-05-04

## 2021-05-02 RX ORDER — IOPAMIDOL 755 MG/ML
58 INJECTION, SOLUTION INTRAVASCULAR ONCE
Status: COMPLETED | OUTPATIENT
Start: 2021-05-02 | End: 2021-05-02

## 2021-05-02 RX ORDER — PREDNISONE 20 MG/1
20 TABLET ORAL DAILY
Status: DISCONTINUED | OUTPATIENT
Start: 2021-05-04 | End: 2021-05-04

## 2021-05-02 RX ADMIN — DOXEPIN HYDROCHLORIDE 3 MG: 10 SOLUTION ORAL at 22:04

## 2021-05-02 RX ADMIN — QUETIAPINE 50 MG: 50 TABLET ORAL at 22:04

## 2021-05-02 RX ADMIN — BUSPIRONE HYDROCHLORIDE 30 MG: 15 TABLET ORAL at 08:47

## 2021-05-02 RX ADMIN — Medication: at 19:55

## 2021-05-02 RX ADMIN — PREDNISONE 40 MG: 20 TABLET ORAL at 08:48

## 2021-05-02 RX ADMIN — PANTOPRAZOLE SODIUM 40 MG: 40 INJECTION, POWDER, FOR SOLUTION INTRAVENOUS at 17:30

## 2021-05-02 RX ADMIN — Medication 2.5 MG: at 08:47

## 2021-05-02 RX ADMIN — LACTULOSE 3 G: 20 SOLUTION ORAL at 17:30

## 2021-05-02 RX ADMIN — LORAZEPAM 0.5 MG: 2 INJECTION INTRAMUSCULAR; INTRAVENOUS at 17:56

## 2021-05-02 RX ADMIN — SULFAMETHOXAZOLE AND TRIMETHOPRIM 80 MG: 80; 16 INJECTION, SOLUTION, CONCENTRATE INTRAVENOUS at 08:59

## 2021-05-02 RX ADMIN — MESALAMINE 1000 MG: 1000 SUPPOSITORY RECTAL at 22:01

## 2021-05-02 RX ADMIN — INSULIN ASPART 1 UNITS: 100 INJECTION, SOLUTION INTRAVENOUS; SUBCUTANEOUS at 13:00

## 2021-05-02 RX ADMIN — QUETIAPINE 25 MG: 25 TABLET ORAL at 08:48

## 2021-05-02 RX ADMIN — ACETAMINOPHEN 975 MG: 325 TABLET, FILM COATED ORAL at 08:46

## 2021-05-02 RX ADMIN — ENOXAPARIN SODIUM 40 MG: 100 INJECTION SUBCUTANEOUS at 16:23

## 2021-05-02 RX ADMIN — ACYCLOVIR: 50 OINTMENT TOPICAL at 20:01

## 2021-05-02 RX ADMIN — BUSPIRONE HYDROCHLORIDE 30 MG: 15 TABLET ORAL at 19:56

## 2021-05-02 RX ADMIN — LORAZEPAM 0.5 MG: 2 INJECTION INTRAMUSCULAR; INTRAVENOUS at 10:23

## 2021-05-02 RX ADMIN — FUROSEMIDE 20 MG: 10 INJECTION, SOLUTION INTRAMUSCULAR; INTRAVENOUS at 10:50

## 2021-05-02 RX ADMIN — LORAZEPAM 0.5 MG: 2 INJECTION INTRAMUSCULAR; INTRAVENOUS at 09:47

## 2021-05-02 RX ADMIN — ACYCLOVIR: 50 OINTMENT TOPICAL at 17:30

## 2021-05-02 RX ADMIN — Medication 2.5 MG: at 19:56

## 2021-05-02 RX ADMIN — ACYCLOVIR: 50 OINTMENT TOPICAL at 16:01

## 2021-05-02 RX ADMIN — FUROSEMIDE 20 MG: 10 INJECTION, SOLUTION INTRAVENOUS at 11:10

## 2021-05-02 RX ADMIN — ACETAMINOPHEN 975 MG: 325 TABLET, FILM COATED ORAL at 16:01

## 2021-05-02 RX ADMIN — LEVETIRACETAM 250 MG: 100 INJECTION, SOLUTION, CONCENTRATE INTRAVENOUS at 21:59

## 2021-05-02 RX ADMIN — LACTULOSE 3 G: 20 SOLUTION ORAL at 08:50

## 2021-05-02 RX ADMIN — PANTOPRAZOLE SODIUM 40 MG: 40 INJECTION, POWDER, FOR SOLUTION INTRAVENOUS at 08:46

## 2021-05-02 RX ADMIN — QUETIAPINE 25 MG: 25 TABLET ORAL at 17:30

## 2021-05-02 RX ADMIN — FLUOXETINE HYDROCHLORIDE 60 MG: 40 CAPSULE ORAL at 08:47

## 2021-05-02 RX ADMIN — IOPAMIDOL 58 ML: 755 INJECTION, SOLUTION INTRAVENOUS at 12:11

## 2021-05-02 RX ADMIN — LEVETIRACETAM 250 MG: 100 INJECTION, SOLUTION, CONCENTRATE INTRAVENOUS at 10:23

## 2021-05-02 RX ADMIN — AMLODIPINE BESYLATE 10 MG: 10 TABLET ORAL at 08:48

## 2021-05-02 RX ADMIN — ACYCLOVIR: 50 OINTMENT TOPICAL at 08:59

## 2021-05-02 RX ADMIN — LORAZEPAM 0.5 MG: 2 INJECTION INTRAMUSCULAR; INTRAVENOUS at 06:14

## 2021-05-02 RX ADMIN — INSULIN ASPART 1 UNITS: 100 INJECTION, SOLUTION INTRAVENOUS; SUBCUTANEOUS at 16:23

## 2021-05-02 RX ADMIN — LINACLOTIDE 290 MCG: 145 CAPSULE, GELATIN COATED ORAL at 08:59

## 2021-05-02 ASSESSMENT — ACTIVITIES OF DAILY LIVING (ADL)
ADLS_ACUITY_SCORE: 19
ADLS_ACUITY_SCORE: 18
ADLS_ACUITY_SCORE: 19
ADLS_ACUITY_SCORE: 19

## 2021-05-02 NOTE — PROVIDER NOTIFICATION
Dr. Elly Summers is notified of pt's complaint that she felt very short of air after she ate breakfast. Sat 88-89% on 5 LPM NC, currently pt sats 92-93% on 7 LPM NC.

## 2021-05-02 NOTE — PROGRESS NOTES
Brief medicine progress note    Called by RN to assess for RUE weakness. On my neuro exam CN II-XII intact, LUE strength 4/5 on hand  elbow extension; 3/5 on elbow flexion. RUE strength 4/5 throughout. Previous neuro exam and documentation had not shown evidence of LUE>RUE weakness so STAT head CT non-con ordered. Pt otherwise without complaints. Resting more comfortably with 5L oxymask in place.    Neuro exam: PEERLA, CN II-XII intact, sensation to light touch intact in all extremities,  LUE strength 4/5 on hand  elbow extension; 3/5 on elbow flexion. RUE strength 4/5 throughout; Bilateral LE strength 3/5 and symmetric.     Non-con head CT:  1. No acute intracranial pathology.   2. Stable postoperative changes of left parietal craniotomy and  underlying resection cavity. No significant change in small subdural  fluid collection along the resection bed measures up to 3 mm. No  midline shift or hydrocephalus.  3. Moderate leukoaraiosis.    Plan:  - No further interventions  - Daughter, La Nena, updated of above    Melina Sanabria MD  Internal Medicine PGY-1

## 2021-05-02 NOTE — PLAN OF CARE
/77 (BP Location: Left arm)   Pulse 109   Temp 98.1  F (36.7  C) (Oral)   Resp 22   Wt 74.1 kg (163 lb 5.8 oz)   SpO2 98%   BMI 28.94 kg/m      Time: 9300-2781  Status:transferred from [facility] for inpatient rad/onc treatment while being treated for acute hypoxic respiratory failure and bilateral PNA. Hx of Glioblastoma s/p resection in 02/2021. Hx of depression/anxiety, HTN, asthma, GERD, and DVT.   Neuro:  A&Ox4, very anxious.   Activity: lift device, PT/OT.   Pain: c/o generalized pain. Tylenol and scheduled Oxy 2.5 mg.   Cardiac: SR/ST, denied chest pain. BP stable and afebrile.   Respiratory: NC 3-5 while awake, BiPAP at night 35% now. Clear/diminished lung sound. Deep breathing and coughing encouraged.   GI/: Incontinent of bowel x 1 this shift. Active bowel sound. Purewick working good for her.   Diet: DD1, nectar thick for fluid. Med crush and applesauce. TPN 35 ml continuous.   Skin: blanchable redness to rectum area. Bruises to left flank area, and bilateral UE.   LDAs: PICC trilple lumen infusing TPN and tko. Purewick.   Labs/Imaging: no replacement this shift.   New change this shift: very sleepy after a dose of Seroquel 25 mg at 1800. Sleeping meds not given at 2200 due to sedation. Hi's daughter updated.    Plan: transfer order to med/surge unit. Monitor sedation.

## 2021-05-02 NOTE — PLAN OF CARE
/83   Pulse 102   Temp 98.4  F (36.9  C) (Axillary)   Resp 28   Wt 74.1 kg (163 lb 5.8 oz)   SpO2 97%   BMI 28.94 kg/m    Pt transferred from [facility] floor for increased SOB.  A&Ox4.  Lethargic.  ST.  Lungs diminished in bases, requiring 3-4L oxiplus mask.  When writer assessed neuros, right upper arm appeared weaker than right which appears to be new.  MD notified, head CT done.  Purewick in place.  Repositioned every 2 hours.  Denies pain on shift.  Oxycodone held at 1400 due to lethergy.  Continue to monitor.

## 2021-05-02 NOTE — PLAN OF CARE
OT/7B: Cancel. Pt medically inappropriate for OT this date and transferring back to 6B for further monitoring and medical workup. Will reschedule.

## 2021-05-02 NOTE — PLAN OF CARE
ST session cancelled. Pt with RRT today due to hypoxia and increased respiratory needs. Pt not appropriate for dysphagia tx today. Will follow up as appropriate.

## 2021-05-02 NOTE — PROGRESS NOTES
Rapid Response Team Note    Assessment   In assessment a rapid response was called on Olga Bailey due to respiratory distress. This presentation is likely due to known PJP PNA, hypervolemia, anxiety, possible PE and worsened by glioblastoma, RP hemorrhage.     Brief Summary of events leading to rapid response:   RR jumped to 30s and HR 100s this morning. Patient reports feeling very anxious despite ativan this morning. Previously Ativan had been helpful but was not today. Patient denies chest pain. No fever or chills. Per d/w primary team, patient has known DVTs and is on prophylactic anticoagulation but is not fully anticoagulated due to RP bleed. VBG at 1015 7.41/48/36/30. WBC and LA normal. CXR diffuse opacities R>L concerning for infection, ARDS or pulmonary edema      Plan   -  Resume BiPAP  -  40 mg IV lasix. Consider repeat lasix later pending pressures and UOP  -  Procal, trop  -  Low threshold for broad spectrum abx  -  EKG  -  Consider CT PE, primary team holding off for now as patient is not a candidate for full dose anticoagulation given bleed and is on ppx dosing. However, if there is clot evident could discuss other options with IR, etc  -  Suggest repeat echo   -  The Internal Medicine primary team was able to be reached and they are in agreement with the above plan.  -  Disposition: The patient will be transferred to [facility].  -  Reassessment and plan follow-up will be performed by the primary team    Olga is critically ill with acute on chronic hypoxic respiratory failure. These features are alarming and indicate that the patient is at imminent risk of life-threatening organ failure. Acute deterioration is being managed by the following critical interventions: see above    Total Critical Care time spent by me, excluding procedures, was 35 minutes.  Wendy Hunter PA-C  [facility] RRT AMCOM Job Code Contact #[phone]    Hospital Course     Admission Diagnosis:   Hypoxia [R09.02]      Physical Exam   Temp: 96.2  F (35.7  C) Temp  Min: 96.2  F (35.7  C)  Max: 98.1  F (36.7  C)  Resp: (!) 36 Resp  Min: 20  Max: 36  SpO2: 99 % SpO2  Min: 90 %  Max: 99 %  Pulse: 108 Pulse  Min: 91  Max: 112    No data recorded  BP: (!) 143/83 Systolic (24hrs), Av , Min:128 , Max:158   Diastolic (24hrs), Av, Min:75, Max:94     I/Os: I/O last 3 completed shifts:  In: 1949.85 [P.O.:1100; I.V.:80]  Out: 1200 [Urine:1200]     Exam:   General: acutely ill appearing, appears anxious  Mental Status: Alert but minimally conversational due to tachypnea, increased work of breathing.  CV: Tachycardia, regular rhythm   Resp: Tachypnea to 30s on 5L OxyMask, increased work of breathing and shallow breathing  Abdomen: Soft, mild diffuse tenderness without guarding or rebound. Bowel sounds active  Peripheral: 1+ LLE peripheral edema, trace RLE edema     Significant Results and Procedures   Lactic Acid:   Recent Labs   Lab Test 21  1015 21  0126 21  0742 21  1405 21  0831 21  1711 21  1711   LACT 1.6  --   --  2.1* 3.0*   < >  --    LACTS  --  1.3 2.2*  --   --   --  2.9*    < > = values in this interval not displayed.     CBC:   Recent Labs   Lab Test 21  0716 21  0537 21  0458   WBC 10.4 10.1 11.5*   HGB 8.6* 9.0* 8.7*   HCT 28.3* 28.9* 27.3*    204 208        Sepsis Evaluation   The patient is known to have an infection.  NO EVIDENCE OF SEPSIS at this time.  Vital sign, physical exam, and lab findings are likely due to see above.

## 2021-05-02 NOTE — PLAN OF CARE
[id number]    Pt arrived from 6B at 0030    BP (!) 154/90 (BP Location: Left arm)   Pulse 95   Temp 96.5  F (35.8  C) (Axillary)   Resp 22   Wt 74.1 kg (163 lb 5.8 oz)   SpO2 95%   BMI 28.94 kg/m      Activity: rested in bed, turned q 2hrs   Neuros: very lethargic most of shift, more awake this morning, orientated x 4, anxious   Cardiac: tachy at times, otherwise WNL  Respiratory: O2 sats 95 % on 5L NC, on bipap until 0600    GI/: abdomen soft/non-tender, last BM 5/1, voiding per [facility]   Diet: DDI, nectar thick liquids   Lines: PICC   Incisions/Drains: n/a    Labs: reviewed   Pain/nausea: denied need for pain meds, no nausea    New changes this shift: none    Plan: continue POC

## 2021-05-02 NOTE — PLAN OF CARE
Pt transferred to  for close monitoring. Report given to 6B RN.Dr. Elly Summers stated that she will update pt's daughter, La Nena.Pt left floor via her bed escorted by float float RN and CNA.Pt will have CT PE first and will go to .

## 2021-05-02 NOTE — PROGRESS NOTES
Responded to Rapid Response call. RRT was called due to change in cardiorespiratory status. Patient was on oxymask @ 6 LPM, RR 36/min, SpO2 90%. Respiratory interventions included PPV. Patient was stabilized and remained on unit. BiPAP was already in room, placed patient on previous settings 10/5 rr14 40% performax mask. Patients RR decreased to 24 and seemed more comfortable. RT left patient in stable condition with RN.    UNA ABARCA, RT, RT  5/2/2021 10:59 AM

## 2021-05-02 NOTE — PLAN OF CARE
Rapid response called as pt's still reported very SOB.Pt still tachypenic  RR 32-36/minute. Tachycardic 104-109/minute.  Rapid response team at bed side  Intervention  Pt received 20 mg IV lasix. Pt is placed on BIPAP FIO2  40%, sats 97. EKG is ordered.  Addendum  Pt received a second dose of 20 mg IV lasix. Pt is currently on BIPAP FIO2 35%, sats 93%.  EKg showed sinus tachy.

## 2021-05-02 NOTE — PROGRESS NOTES
[facility]    Progress Note - Markianna 3 Service        Date of Admission:  4/26/2021    Assessment & Plan   Olga Bailey is a 75 y.o. F w/ GBM s/p resection (Feb 2021), depression/anxiety, HTN, asthma, and GERD, who was transferred to [facility] for consideration of inpt rad/onc treatment while she continues to be treated for complex presentation of acute hypoxic respiratory failure (due to possible PJP pneumonia and/or TRALI), 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    Today:  - RRT this AM for increased SOB, hypoxia, and anxiety - improving with bipap, ativan 1.5 mg. VBG, lactate WNL  - CT PE  - IV lasix 40 mg x1 today, goal net -500 ml - will schedule IV 20 mg daily starting tomorrow  - Transfer back to C  - care conference Monday 5/3  - Touch base with RD re: concentrating TPN     Acute hypoxic respiratory failure   Bilateral Pneumonia; presumed PJP s/p ABx treatment  Concern for transfusion-related acute lung injury (TRALI)  Concern for R hemidiaphragm paralysis  Dyspneic/hypoxic in late March for admission at OSH. Initially treated with ceftri/doxy. ID switched to course of Zosyn/doxy with Bactrim treatment dosing for suspected PJP (had been on steroids after admission for glioblastoma resection; Fungitell positive but Galactomannan negative). Unable to get adequate sputum samples for diagnosis of PJP. Improved with the Bactrim until several days after RP bleed (see below) and was c/f TRALI as well. W/u for PE, COVID, and respiratory viral panel was negative. Bilateral lung opacities present. Concern for ongoing PJP infection (Bactrim was re-started), inflammatory lung condition (increased steroids), volume overload (diuresed for several days with IV Lasix). Imaging shows R hemidiaphragm elevation (concerning for paralysis?), and opacities appear to be improving as are oxygen requirements (weaned from high-flow to Oxymask 4-7L  now). Holding off on bronch per pulm recs and pt/family preference given likely need for intubation. Has had several RRTs for tachypnea and tachycardia, appear primarily related to anxiety. Improve with PRN anxiety medication and Bipap for comfort. Full work-up largely unremarkable. No improvement in O2 needs after diuresis here at [facility].  - Pulmonology consulted, following peripherally  - Working on anxiety as below   - CT PE - unable to anticoagulate pt given RP hematoma but may provide more information re: ongoing hypoxic respiratory failure  - IV lasix 40 mg x1, goal net -500 ml   > IV lasix 20 mg daily scheduled starting 5/3  - F/u troponin, procal 5/2  - pulm, ID previously consulted   - wean steroids: from methylpred IV 62.5 mg BID -> prednisone 60 mg x 2 d (starting 4/28) -> pred 40 mg x 2 d -> pred 30 mg x 1 d -> pred 20 mg x 1 d -> pred 10 mg - continue   - Will monitor for signs of adrenal insufficiency     - both agree on holding off on bronch (this is also family/pt's preference)  - PJP ppx: Bactrim single strength qday x 1.5 mo after steroids end (completed 3 week treatment course at OSH)  - PJP stain, PCR; conventional sputum culture ordered if patient can produce sputum   - f/u blood cultures - NGTD  - wean O2 as able to keep sats > 92%  - Bipap when sleeping and PRN for comfort  - SLP consulted - DD1 and nectar thick liquids     Glioblastoma Multiforme  Left frontoparietal brain glioblastoma status post resection 2/23/2021. Seen by radiation oncology 3/24 recommended XRT and chemo radiation. However subsequently admitted for resp failure. MRI brain here signs of possible disease recurrence. Hem/onc concerned that she might not be a candidate for chemotherapy or radiation currently due to poor performance status.  -Rad/onc following; appreciate recs   -Patient being presented at CNS tumor conference 5/3   -Tentatively planning to start radiation course will inpatient 5/4  -Heme/onc consulted,  appreciate recs   - Patient not strong enough for chemo   - would consider temozolomide at late date pending clinical course (needs to improve performance status)  - Anticipate care conference 5/3/21    Major depressive disorder, recurrent  Anxiety  Follows with psychiatry and psychology as outpatint.  Saw health psychology during admission for GBM, made plan for outpatient follow-up and med adjustment,  but was unable.  Given prolonged admission patient could benefit from inpatient consultation again and patient requesting this.  - Psychiatry consulted, appreciate recs  - Palliative recs appreciated;  - Seroquel 25 mg BID + 50 mg at bedtime  - Ativan 0.5 mg q4h PRN - IV per pt request  - Scheduled doxepin PRN at night for sleep  - c/t PTA Buspar  - PTA prozac (dose reduced from 80 to 60 mg)  - Scheduled oxycodone TID + PRN for dyspnea  - Health psychology consult; appreciate recs     Bilateral lower extremity DVT  Right retroperitoneal hematoma   On 3/29 at outside hospital patient's O2 needs increased, duplex ultrasound revealed bilateral lower extremity DVT. CT PE negative for embolism.  Treated with IV heparin and transitioned to Lovenox 70 mg.  On 4/14 this was complicated by retroperitoneal bleed, requiring 4 units of packed red blood cells.  Lovenox was held, and IVC filter was placed on 4/16. Vascular surgery was involved, and a CT abdomen was stable bleed so no surgical intervention was required.  Eventually resumed on prophylactic 40mg of Lovenox twice daily.  -Continue 40 mg Lovenox qday  -hem/onc consulted; appreciate recs   - pain plan:              - Tylenol 975 mg q8h brianna              - oxycodone 2.5-5 mg q4h PRN for pain              - discontinued Dilaudid to avoid delirium  - Asymmetric foot swelling , 4/30/2021. Will not obtain US given there are no changes we would make to the anticoagulation plan; discussed with heme on 4/29/21    Ileus  Began after bleed at outside hospital 4/14; was on TPN for  nutrition.  GI was consulted for assistance with bowel management.  With escalating bowel regimen finally had small BM 4/25 after tap water enema, had some liquid stool 4/27. AXR shows non-obstructive bowel gas pattern.  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 Urine sodium 50 while her sodium was 128, certainly consistent with this. Neurologic etiology with her GBM resection likely. Now trending up.   - trend Na; consider fluid restriction if any concern for worsening Na     Shock Liver, improving  LFTs sharply up after RP bleed at OSH, [place] due to shock liver. LFT's have steadily trended down since. Only AST remains elevated and trending down.   -Recheck CMP in am    Steroid-induced hyperglycemia   Intermittently requiring insulin  -Low sliding scale insulin  -Gluc checks     Leukocytosis  Ddx is infection vs steroid-induced.  - trend CBC     Hypertension  Sinus tachycardia  Started on metoprolol at OSH for sinus tachycardia.  - c/t PTA amlodipine 10 mg qday  - hold BB    Chronic medical problems:  - albuterol PRN  - BIPAP at bedtime (on CPAP at home) and for comfort  - GERD: back to PTA PO PPI     Diet: Snacks/Supplements Adult: Ensure Enlive; Between Meals  Dysphagia Diet Level 1 Pureed Nectar Thickened Liquids (pre-thickened or use instant food thickener)  parenteral nutrition - ADULT compounded formula    Fluids: TPN  Lines: PICC line  DVT Prophylaxis: Enoxaparin (Lovenox) SQ  Martino Catheter: not present  Code Status: Full Code         Disposition Plan   Expected discharge: > 7 days, recommended to transitional care unit once respiratory status improves, plan for rehab and chemo/radiation in place .  Entered: Melina Sanabria MD  05/02/2021, 11:27 AM     The patient's care was discussed with the Attending Physician, Dr. Tomlin.    Melina Sanabria MD  PGY-1, Internal Medicine  [facility]  ______________________________________________________________________    Interval History    Pt slept well overnight with bipap. Per nursing notes was more lethargic after receiving anxiety medications last night, didn't require bedtime meds for sleep. This AM pt more anxious, initially vitally stable and received IV ativan. Pt believes anxiety regimen started several days ago has been helpful. However, anxiety and SOB worsened later in AM. At one point was more hypoxic to high 80s requiring increased O2. Received additional IV ativan x2 and was placed on bipap during RRT - seemed to improve, pt more sleepy and tachypnea improved. Pt denies chest pain, palpitations, abdominal pain, N/V, aspiration sxs, fevers/chills.    4 Point ROS obtained and negative except as noted above.     Data reviewed today: I reviewed all medications, new labs and imaging results over the last 24 hours. I personally reviewed   Physical Exam   Vital Signs: Temp: 96.2  F (35.7  C) Temp src: Oral BP: (!) 145/72 Pulse: 114   Resp: (!) 36 SpO2: 93 % O2 Device: BiPAP/CPAP Oxygen Delivery: 5 LPM  Weight: 163 lbs 5.77 oz   General Appearance: lying down in bed, drowsy but able to awaken. Visibly anxious upon awakening.  Eyes: no scleral icterus, EOMI  HEENT: neck supple, normocephalic, NC in place. Dry appearing mucous membranes   Respiratory: increased WOB 5-7L NC with accessory muscle use, tachypneic to 30s, fine crackles in bilateral lung fields,   Cardiovascular: Tachycardia, regular rhythm, normal S1/S2, no murmurs appreciated, intact distal pulses  GI: soft, non-tender, non-distended  Skin: scattered bruises over upper exposed upper extremities, no rash noted  Musculoskeletal: Non-pitting edema left foot and distal shin; no edema of right foot.  Neurologic: Moves all  extremities spontaneously, follows commands.   Psychiatric: Anxious appearing     Data   Recent Labs   Lab 05/02/21  0716 05/01/21  1839 05/01/21  0537 04/30/21  0458 04/28/21  0831 04/28/21  0831 04/28/21  0535 04/28/21  0026 04/27/21  0548 04/27/21  0548   WBC 10.4  --  10.1 11.5*   < >  --  13.7*  --   --  13.2*   HGB 8.6*  --  9.0* 8.7*   < >  --  9.3*  --   --  8.7*   MCV 95  --  95 95   < >  --  91  --   --  89     --  204 208   < >  --  219  --   --  216   INR  --   --   --   --   --   --   --   --   --  1.02     --  138 137   < >  --  134  --   --  132*   POTASSIUM 3.8 4.7 3.4 3.5   < >  --  3.8  --    < > 3.6   CHLORIDE 103  --  103 102   < >  --  102  --   --  100   CO2 30  --  31 29   < >  --  26  --   --  25   BUN 32*  --  34* 27   < >  --  16  --   --  16   CR 0.37*  --  0.36* 0.39*   < >  --  0.40*  --   --  0.36*   ANIONGAP 6  --  4 6   < >  --  6  --   --  8   ESTIVEN 8.5  --  8.4* 8.5   < >  --  8.7  --   --  8.5   *  --  116* 120*   < >  --  155*  --   --  166*   ALBUMIN 2.2*  --  2.4* 2.4*   < >  --  2.5*  --   --  2.3*   PROTTOTAL 5.2*  --  5.2* 5.0*   < >  --  5.4*  --   --  5.2*   BILITOTAL 0.4  --  0.4 0.4   < >  --  0.3  --   --  0.4   ALKPHOS 116  --  127 117   < >  --  116  --   --  126   ALT 50  --  55* 58*   < >  --  82*  --   --  92*   AST 32  --  37 37   < >  --  35  --   --  37   TROPI  --   --   --   --   --  0.044 0.041 0.048*   < >  --     < > = values in this interval not displayed.     Recent Results (from the past 24 hour(s))   XR Chest Port 1 View    Narrative    Exam: XR CHEST PORT 1 VIEW, 5/2/2021 10:47 AM    Indication: hypoxia    Comparison: Chest radiograph 4/29/2021    Findings:   Portable AP view of the chest. Right arm PICC tip projects over the  cavoatrial junction. Elevation the right hemidiaphragm. The cardiac  mediastinal silhouette is within normal limits. Poorly vasculature is  indistinct. Diffuse predominantly reticular opacities throughout  the  lungs with a few patchy airspace opacities bilaterally. No large  pleural effusion. No pneumothorax. Postoperative changes of anterior  cervical discectomy and fusion in the lower cervical spine.      Impression    Impression: Diffuse mixed pulmonary opacities, slightly increased in  the right lung. Favor infection, ARDS, or pulmonary edema.    AGUEDA WRIGHT MD

## 2021-05-02 NOTE — PROVIDER NOTIFICATION
05/02/21 1100   Call Information   Date of Call 05/02/21   Time of Call 1104   Name of person requesting the team Analy   Title of person requesting team RN   RRT Arrival time 1040   Reason for call   Type of RRT Adult   Primary reason for call Respiratory   Respiratory Rate greater than 24;Labored breathing;Respiratory pattern change   Was patient transferred from the ED, ICU, or PACU within last 24 hours prior to RRT call? No   SBAR   Situation RR 35-40s, lethargic, increased WOB   Background 75 y.o. F w/ recent new dx of glioblastoma, now with acute hypoxic resp failure (PJP), RP bleed, B/L LE DVTs   Notable History/Conditions Cancer;Hypertension   Assessment Lethargic, tachypneic, accessory muscle and abdominal muscle use,    Interventions Meds;O2 per N/C or mask  (BiPAP)   Patient Outcome   Patient Outcome Transferred to  (6b)   RRT Team   Attending/Primary/Covering Physician Maximilian 3   Date Attending Physician notified 05/02/21   Time Attending Physician notified 1104   Physician(s) Wendy Child RN Funmilayo Beckford   RT Mariann   Post RRT Intervention Assessment   Post RRT Assessment Stable/Improved   Date Follow Up Done 05/02/21   Time Follow Up Done 5802

## 2021-05-02 NOTE — PROGRESS NOTES
Pt  Complained  that she felt very short of air after she ate breakfast. Pt was fed by a CNA; pt and CNA stated that pt did not cough while eating.  Sat 88-89% on 5 LPM NC, currently pt sats 92-93% on 7 LPM NC. [id number]/minut. Tachycardic 103-106/minute. Very anxious.  Maximilian 3 team came at bed side and assessed pt.    Intervention    Pt is a mouth breather so pt is placed on 5 LPM oxyplus face mask, sats hig 90's.  Pt received 0.5 mg IV ativan x1.  Blood gas venous, acid whole blood and x-ray portable is ordered.

## 2021-05-03 ENCOUNTER — APPOINTMENT (OUTPATIENT)
Dept: SPEECH THERAPY | Facility: CLINIC | Age: 76
DRG: 177 | End: 2021-05-03
Attending: STUDENT IN AN ORGANIZED HEALTH CARE EDUCATION/TRAINING PROGRAM
Payer: MEDICARE

## 2021-05-03 ENCOUNTER — APPOINTMENT (OUTPATIENT)
Dept: OCCUPATIONAL THERAPY | Facility: CLINIC | Age: 76
DRG: 177 | End: 2021-05-03
Attending: STUDENT IN AN ORGANIZED HEALTH CARE EDUCATION/TRAINING PROGRAM
Payer: MEDICARE

## 2021-05-03 ENCOUNTER — TUMOR CONFERENCE (OUTPATIENT)
Dept: ONCOLOGY | Facility: CLINIC | Age: 76
End: 2021-05-03

## 2021-05-03 ENCOUNTER — DOCUMENTATION ONLY (OUTPATIENT)
Dept: RADIATION ONCOLOGY | Facility: CLINIC | Age: 76
End: 2021-05-03

## 2021-05-03 LAB
ALBUMIN SERPL-MCNC: 2.4 G/DL (ref 3.4–5)
ALP SERPL-CCNC: 111 U/L (ref 40–150)
ALT SERPL W P-5'-P-CCNC: 44 U/L (ref 0–50)
ANION GAP SERPL CALCULATED.3IONS-SCNC: 5 MMOL/L (ref 3–14)
AST SERPL W P-5'-P-CCNC: 25 U/L (ref 0–45)
BILIRUB SERPL-MCNC: 0.3 MG/DL (ref 0.2–1.3)
BUN SERPL-MCNC: 35 MG/DL (ref 7–30)
CALCIUM SERPL-MCNC: 8.8 MG/DL (ref 8.5–10.1)
CHLORIDE SERPL-SCNC: 103 MMOL/L (ref 94–109)
CO2 SERPL-SCNC: 31 MMOL/L (ref 20–32)
CREAT SERPL-MCNC: 0.37 MG/DL (ref 0.52–1.04)
ERYTHROCYTE [DISTWIDTH] IN BLOOD BY AUTOMATED COUNT: 20.3 % (ref 10–15)
GFR SERPL CREATININE-BSD FRML MDRD: >90 ML/MIN/{1.73_M2}
GLUCOSE BLDC GLUCOMTR-MCNC: 115 MG/DL (ref 70–99)
GLUCOSE BLDC GLUCOMTR-MCNC: 117 MG/DL (ref 70–99)
GLUCOSE BLDC GLUCOMTR-MCNC: 127 MG/DL (ref 70–99)
GLUCOSE BLDC GLUCOMTR-MCNC: 162 MG/DL (ref 70–99)
GLUCOSE BLDC GLUCOMTR-MCNC: 294 MG/DL (ref 70–99)
GLUCOSE SERPL-MCNC: 123 MG/DL (ref 70–99)
HCT VFR BLD AUTO: 28 % (ref 35–47)
HGB BLD-MCNC: 8.3 G/DL (ref 11.7–15.7)
INR PPP: 0.99 (ref 0.86–1.14)
INTERPRETATION ECG - MUSE: NORMAL
LACTATE BLD-SCNC: 1.8 MMOL/L (ref 0.7–2)
MAGNESIUM SERPL-MCNC: 2.2 MG/DL (ref 1.6–2.3)
MCH RBC QN AUTO: 28.5 PG (ref 26.5–33)
MCHC RBC AUTO-ENTMCNC: 29.6 G/DL (ref 31.5–36.5)
MCV RBC AUTO: 96 FL (ref 78–100)
PHOSPHATE SERPL-MCNC: 3.2 MG/DL (ref 2.5–4.5)
PLATELET # BLD AUTO: 199 10E9/L (ref 150–450)
POTASSIUM SERPL-SCNC: 3.6 MMOL/L (ref 3.4–5.3)
PREALB SERPL IA-MCNC: 35 MG/DL (ref 15–45)
PROT SERPL-MCNC: 5.2 G/DL (ref 6.8–8.8)
RBC # BLD AUTO: 2.91 10E12/L (ref 3.8–5.2)
SODIUM SERPL-SCNC: 139 MMOL/L (ref 133–144)
TRIGL SERPL-MCNC: 186 MG/DL
WBC # BLD AUTO: 9.3 10E9/L (ref 4–11)

## 2021-05-03 PROCEDURE — 250N000011 HC RX IP 250 OP 636: Performed by: STUDENT IN AN ORGANIZED HEALTH CARE EDUCATION/TRAINING PROGRAM

## 2021-05-03 PROCEDURE — 999N000215 HC STATISTIC HFNC ADULT NON-CPAP

## 2021-05-03 PROCEDURE — 99356 PR PROLONGED SERV,INPATIENT,1ST HR: CPT | Performed by: INTERNAL MEDICINE

## 2021-05-03 PROCEDURE — 250N000013 HC RX MED GY IP 250 OP 250 PS 637: Performed by: STUDENT IN AN ORGANIZED HEALTH CARE EDUCATION/TRAINING PROGRAM

## 2021-05-03 PROCEDURE — 36592 COLLECT BLOOD FROM PICC: CPT | Performed by: HOSPITALIST

## 2021-05-03 PROCEDURE — 258N000003 HC RX IP 258 OP 636: Performed by: STUDENT IN AN ORGANIZED HEALTH CARE EDUCATION/TRAINING PROGRAM

## 2021-05-03 PROCEDURE — 83605 ASSAY OF LACTIC ACID: CPT | Performed by: HOSPITALIST

## 2021-05-03 PROCEDURE — 999N000157 HC STATISTIC RCP TIME EA 10 MIN

## 2021-05-03 PROCEDURE — 250N000013 HC RX MED GY IP 250 OP 250 PS 637: Performed by: INTERNAL MEDICINE

## 2021-05-03 PROCEDURE — 99233 SBSQ HOSP IP/OBS HIGH 50: CPT | Performed by: INTERNAL MEDICINE

## 2021-05-03 PROCEDURE — 84478 ASSAY OF TRIGLYCERIDES: CPT | Performed by: STUDENT IN AN ORGANIZED HEALTH CARE EDUCATION/TRAINING PROGRAM

## 2021-05-03 PROCEDURE — 85610 PROTHROMBIN TIME: CPT | Performed by: STUDENT IN AN ORGANIZED HEALTH CARE EDUCATION/TRAINING PROGRAM

## 2021-05-03 PROCEDURE — 99233 SBSQ HOSP IP/OBS HIGH 50: CPT | Mod: GC | Performed by: HOSPITALIST

## 2021-05-03 PROCEDURE — 80053 COMPREHEN METABOLIC PANEL: CPT | Performed by: STUDENT IN AN ORGANIZED HEALTH CARE EDUCATION/TRAINING PROGRAM

## 2021-05-03 PROCEDURE — 94660 CPAP INITIATION&MGMT: CPT

## 2021-05-03 PROCEDURE — 92526 ORAL FUNCTION THERAPY: CPT | Mod: GN | Performed by: SPEECH-LANGUAGE PATHOLOGIST

## 2021-05-03 PROCEDURE — 84134 ASSAY OF PREALBUMIN: CPT | Performed by: STUDENT IN AN ORGANIZED HEALTH CARE EDUCATION/TRAINING PROGRAM

## 2021-05-03 PROCEDURE — 250N000011 HC RX IP 250 OP 636

## 2021-05-03 PROCEDURE — 120N000003 HC R&B IMCU UMMC

## 2021-05-03 PROCEDURE — 85027 COMPLETE CBC AUTOMATED: CPT | Performed by: STUDENT IN AN ORGANIZED HEALTH CARE EDUCATION/TRAINING PROGRAM

## 2021-05-03 PROCEDURE — 97535 SELF CARE MNGMENT TRAINING: CPT | Mod: GO | Performed by: OCCUPATIONAL THERAPIST

## 2021-05-03 PROCEDURE — 84100 ASSAY OF PHOSPHORUS: CPT | Performed by: STUDENT IN AN ORGANIZED HEALTH CARE EDUCATION/TRAINING PROGRAM

## 2021-05-03 PROCEDURE — 999N001017 HC STATISTIC GLUCOSE BY METER IP

## 2021-05-03 PROCEDURE — 36415 COLL VENOUS BLD VENIPUNCTURE: CPT | Performed by: STUDENT IN AN ORGANIZED HEALTH CARE EDUCATION/TRAINING PROGRAM

## 2021-05-03 PROCEDURE — 250N000012 HC RX MED GY IP 250 OP 636 PS 637: Performed by: STUDENT IN AN ORGANIZED HEALTH CARE EDUCATION/TRAINING PROGRAM

## 2021-05-03 PROCEDURE — 97530 THERAPEUTIC ACTIVITIES: CPT | Mod: GO | Performed by: OCCUPATIONAL THERAPIST

## 2021-05-03 PROCEDURE — 83735 ASSAY OF MAGNESIUM: CPT | Performed by: STUDENT IN AN ORGANIZED HEALTH CARE EDUCATION/TRAINING PROGRAM

## 2021-05-03 PROCEDURE — C9113 INJ PANTOPRAZOLE SODIUM, VIA: HCPCS

## 2021-05-03 PROCEDURE — 250N000009 HC RX 250: Performed by: HOSPITALIST

## 2021-05-03 RX ORDER — ACETAMINOPHEN 325 MG/1
975 TABLET ORAL 3 TIMES DAILY
Status: DISCONTINUED | OUTPATIENT
Start: 2021-05-03 | End: 2021-05-15 | Stop reason: HOSPADM

## 2021-05-03 RX ORDER — QUETIAPINE FUMARATE 25 MG/1
25 TABLET, FILM COATED ORAL 2 TIMES DAILY PRN
Status: DISCONTINUED | OUTPATIENT
Start: 2021-05-03 | End: 2021-05-15 | Stop reason: HOSPADM

## 2021-05-03 RX ORDER — DEXAMETHASONE 4 MG/1
4 TABLET ORAL DAILY
Status: DISCONTINUED | OUTPATIENT
Start: 2021-05-04 | End: 2021-05-14

## 2021-05-03 RX ADMIN — ACYCLOVIR: 50 OINTMENT TOPICAL at 00:25

## 2021-05-03 RX ADMIN — I.V. FAT EMULSION 250 ML: 20 EMULSION INTRAVENOUS at 21:11

## 2021-05-03 RX ADMIN — LACTULOSE 3 G: 20 SOLUTION ORAL at 07:54

## 2021-05-03 RX ADMIN — LEVETIRACETAM 250 MG: 100 INJECTION, SOLUTION, CONCENTRATE INTRAVENOUS at 21:34

## 2021-05-03 RX ADMIN — ACETAMINOPHEN 975 MG: 325 TABLET, FILM COATED ORAL at 21:21

## 2021-05-03 RX ADMIN — QUETIAPINE 50 MG: 50 TABLET ORAL at 21:21

## 2021-05-03 RX ADMIN — INSULIN ASPART 1 UNITS: 100 INJECTION, SOLUTION INTRAVENOUS; SUBCUTANEOUS at 18:38

## 2021-05-03 RX ADMIN — FLUOXETINE HYDROCHLORIDE 60 MG: 40 CAPSULE ORAL at 07:52

## 2021-05-03 RX ADMIN — BUSPIRONE HYDROCHLORIDE 30 MG: 15 TABLET ORAL at 07:52

## 2021-05-03 RX ADMIN — LINACLOTIDE 290 MCG: 145 CAPSULE, GELATIN COATED ORAL at 07:53

## 2021-05-03 RX ADMIN — SULFAMETHOXAZOLE AND TRIMETHOPRIM 80 MG: 80; 16 INJECTION, SOLUTION, CONCENTRATE INTRAVENOUS at 08:16

## 2021-05-03 RX ADMIN — DOXEPIN HYDROCHLORIDE 3 MG: 10 SOLUTION ORAL at 21:35

## 2021-05-03 RX ADMIN — Medication 2.5 MG: at 14:45

## 2021-05-03 RX ADMIN — ACYCLOVIR: 50 OINTMENT TOPICAL at 14:45

## 2021-05-03 RX ADMIN — AMLODIPINE BESYLATE 10 MG: 10 TABLET ORAL at 08:09

## 2021-05-03 RX ADMIN — ACYCLOVIR: 50 OINTMENT TOPICAL at 03:05

## 2021-05-03 RX ADMIN — INSULIN ASPART 2 UNITS: 100 INJECTION, SOLUTION INTRAVENOUS; SUBCUTANEOUS at 11:47

## 2021-05-03 RX ADMIN — ACETAMINOPHEN 975 MG: 325 TABLET, FILM COATED ORAL at 14:44

## 2021-05-03 RX ADMIN — PANTOPRAZOLE SODIUM 40 MG: 40 INJECTION, POWDER, FOR SOLUTION INTRAVENOUS at 07:53

## 2021-05-03 RX ADMIN — Medication 2.5 MG: at 07:51

## 2021-05-03 RX ADMIN — ACYCLOVIR: 50 OINTMENT TOPICAL at 21:33

## 2021-05-03 RX ADMIN — QUETIAPINE 25 MG: 25 TABLET ORAL at 07:53

## 2021-05-03 RX ADMIN — MESALAMINE 1000 MG: 1000 SUPPOSITORY RECTAL at 21:36

## 2021-05-03 RX ADMIN — QUETIAPINE 25 MG: 25 TABLET ORAL at 18:39

## 2021-05-03 RX ADMIN — ACYCLOVIR: 50 OINTMENT TOPICAL at 11:47

## 2021-05-03 RX ADMIN — ACYCLOVIR: 50 OINTMENT TOPICAL at 05:46

## 2021-05-03 RX ADMIN — ACETAMINOPHEN 975 MG: 325 TABLET, FILM COATED ORAL at 07:52

## 2021-05-03 RX ADMIN — ACYCLOVIR: 50 OINTMENT TOPICAL at 08:10

## 2021-05-03 RX ADMIN — PREDNISONE 30 MG: 20 TABLET ORAL at 07:53

## 2021-05-03 RX ADMIN — BUSPIRONE HYDROCHLORIDE 30 MG: 15 TABLET ORAL at 21:21

## 2021-05-03 RX ADMIN — Medication 2.5 MG: at 21:21

## 2021-05-03 RX ADMIN — LORAZEPAM 0.5 MG: 2 INJECTION INTRAMUSCULAR; INTRAVENOUS at 17:06

## 2021-05-03 RX ADMIN — FUROSEMIDE 20 MG: 10 INJECTION, SOLUTION INTRAMUSCULAR; INTRAVENOUS at 07:53

## 2021-05-03 RX ADMIN — LEVETIRACETAM 250 MG: 100 INJECTION, SOLUTION, CONCENTRATE INTRAVENOUS at 09:43

## 2021-05-03 RX ADMIN — Medication: at 21:11

## 2021-05-03 RX ADMIN — LACTULOSE 3 G: 20 SOLUTION ORAL at 18:40

## 2021-05-03 RX ADMIN — PANTOPRAZOLE SODIUM 40 MG: 40 INJECTION, POWDER, FOR SOLUTION INTRAVENOUS at 18:55

## 2021-05-03 RX ADMIN — ENOXAPARIN SODIUM 40 MG: 100 INJECTION SUBCUTANEOUS at 17:46

## 2021-05-03 ASSESSMENT — ACTIVITIES OF DAILY LIVING (ADL)
ADLS_ACUITY_SCORE: 19
ADLS_ACUITY_SCORE: 24
ADLS_ACUITY_SCORE: 19
ADLS_ACUITY_SCORE: 23
ADLS_ACUITY_SCORE: 19
ADLS_ACUITY_SCORE: 23

## 2021-05-03 NOTE — PROGRESS NOTES
[employer]    Progress Note - [facility] 3 Service        Date of Admission:  4/26/2021    Assessment & Plan   Olga Bailey is a 75 y.o. F w/ GBM s/p resection (Feb 2021), depression/anxiety, HTN, asthma, and GERD, who was transferred to [facility] for consideration of inpt rad/onc treatment while she continues to be treated for complex presentation of acute hypoxic respiratory failure (due to possible PJP pneumonia and/or TRALI), 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    Today:  - Care conference today at 13:00  - Scheduled IV lasix 20 mg daily, goal net 0 to -500 ml daily  - Clarify plan for steroid taper with onc today (does patient need to remain on 10 mg prednisone daily after taper)     Acute hypoxic respiratory failure   Bilateral Pneumonia; presumed PJP s/p ABx treatment  Concern for transfusion-related acute lung injury (TRALI)  Concern for R hemidiaphragm paralysis  Dyspneic/hypoxic in late March for admission at OSH. Initially treated with ceftri/doxy. ID switched to course of Zosyn/doxy with Bactrim treatment dosing for suspected PJP (had been on steroids after admission for glioblastoma resection; Fungitell positive but Galactomannan negative). Unable to get adequate sputum samples for diagnosis of PJP. Improved with the Bactrim until several days after RP bleed (see below) and was c/f TRALI as well. W/u for PE, COVID, and respiratory viral panel was negative. Bilateral lung opacities present. Concern for ongoing PJP infection (Bactrim was re-started), inflammatory lung condition (increased steroids), volume overload (diuresed for several days with IV Lasix). Imaging shows R hemidiaphragm elevation (concerning for paralysis?), and opacities appear to be improving as are oxygen requirements. Holding off on bronch per pulm recs and pt/family preference given likely need for intubation. Has had several RRTs for tachypnea and  tachycardia, appear primarily related to anxiety. Improve with PRN anxiety medication and Bipap for comfort. Full work-up largely unremarkable. No improvement in O2 needs after diuresis here at [facility].  - Pulmonology consulted, following peripherally  - Working on anxiety as below   - IV lasix 20 mg daily scheduled - goal net 0 to -500 ml daily  - pulm, ID previously consulted   - wean steroids: from methylpred IV 62.5 mg BID -> prednisone 60 mg x 2 d (starting 4/28) -> pred 40 mg x 2 d -> pred 30 mg x 1 d -> pred 20 mg x 1 d -> pred 10 mg - continue indefinitely or stop?   - Will monitor for signs of adrenal insufficiency     - both agree on holding off on bronch (this is also family/pt's preference)  - PJP ppx: Bactrim single strength qday x 1.5 mo after steroids end (completed 3 week treatment course at OSH)  - PJP stain, PCR; conventional sputum culture ordered if patient can produce sputum   - f/u blood cultures - NGTD  - wean O2 as able to keep sats > 92%  - Bipap when sleeping and PRN for comfort  - SLP consulted - DD1 and nectar thick liquids     Glioblastoma Multiforme  Left frontoparietal brain glioblastoma status post resection 2/23/2021. Seen by radiation oncology 3/24 recommended XRT and chemo radiation. However subsequently admitted for resp failure. MRI brain here signs of possible disease recurrence. Hem/onc concerned that she might not be a candidate for chemotherapy or radiation currently due to poor performance status.  -Rad/onc following; appreciate recs   -Patient being presented at CNS tumor conference 5/3   -Tentatively planning to start radiation course will inpatient 5/4  -Heme/onc consulted, appreciate recs   - Patient not strong enough for chemo   - would consider temozolomide at late date pending clinical course (needs to improve performance status)  - Anticipate care conference 5/3/21    Major depressive disorder, recurrent  Anxiety  Follows with psychiatry and psychology as outpatint.   Saw health psychology during admission for GBM, made plan for outpatient follow-up and med adjustment,  but was unable.  Given prolonged admission patient could benefit from inpatient consultation again and patient requesting this.  - Psychiatry consulted, appreciate recs  - Palliative recs appreciated;  - Seroquel 25 mg BID + 50 mg at bedtime  - Ativan 0.5 mg q4h PRN - IV per pt request  - Scheduled doxepin PRN at night for sleep  - c/t PTA Buspar  - PTA prozac (dose reduced from 80 to 60 mg)  - Scheduled oxycodone TID + PRN for dyspnea  - Health psychology consult; appreciate recs     Bilateral lower extremity DVT  Right retroperitoneal hematoma   On 3/29 at outside hospital patient's O2 needs increased, duplex ultrasound revealed bilateral lower extremity DVT. CT PE negative for embolism.  Treated with IV heparin and transitioned to Lovenox 70 mg.  On 4/14 this was complicated by retroperitoneal bleed, requiring 4 units of packed red blood cells.  Lovenox was held, and IVC filter was placed on 4/16. Vascular surgery was involved, and a CT abdomen was stable bleed so no surgical intervention was required.  Eventually resumed on prophylactic 40mg of Lovenox twice daily.  -Continue 40 mg Lovenox qday  -hem/onc consulted; appreciate recs   - pain plan:              - Tylenol 975 mg q8h brianna              - oxycodone 2.5-5 mg q4h PRN for pain              - discontinued Dilaudid to avoid delirium  - Asymmetric foot swelling , 4/30/2021. Will not obtain US given there are no changes we would make to the anticoagulation plan; discussed with heme on 4/29/21    Ileus  Began after bleed at outside hospital 4/14; was on TPN for nutrition.  GI was consulted for assistance with bowel management.  With escalating bowel regimen finally had small BM 4/25 after tap water enema, had some liquid stool 4/27. AXR shows non-obstructive bowel gas pattern.  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 Urine sodium 50 while her sodium was 128, certainly consistent with this. Neurologic etiology with her GBM resection likely. Now trending up.   - trend Na; consider fluid restriction if any concern for worsening Na     Shock Liver, improving  LFTs sharply up after RP bleed at OSH, [place] due to shock liver. LFT's have steadily trended down since. Only AST remains elevated and trending down.   -Recheck CMP in am    Steroid-induced hyperglycemia   Intermittently requiring insulin  -Low sliding scale insulin  -Gluc checks     Leukocytosis  Ddx is infection vs steroid-induced.  - trend CBC     Hypertension  Sinus tachycardia  Started on metoprolol at OSH for sinus tachycardia.  - c/t PTA amlodipine 10 mg qday  - hold BB    Chronic medical problems:  - albuterol PRN  - BIPAP at bedtime (on CPAP at home) and for comfort  - GERD: back to PTA PO PPI     Diet: Snacks/Supplements Adult: Ensure Enlive; Between Meals  Dysphagia Diet Level 1 Pureed Nectar Thickened Liquids (pre-thickened or use instant food thickener)  parenteral nutrition - ADULT compounded formula  parenteral nutrition - ADULT compounded formula CYCLE    Fluids: TPN  Lines: PICC line  DVT Prophylaxis: Enoxaparin (Lovenox) SQ  Martino Catheter: not present  Code Status: Full Code         Disposition Plan   Expected discharge: > 7 days, recommended to transitional care unit once respiratory status improves, plan for rehab and chemo/radiation in place .  Entered: Melina Sanabria MD 05/03/2021, 12:07 PM     The patient's care was discussed with the Attending Physician, Dr. Tomlin.    Melina Sanabria MD  PGY-1, Internal Medicine  [facility]  "[place]  ______________________________________________________________________    Interval History    Pt slept well overnight with bipap. No further issues with panic attacks/anxiety. Did receive IV ativan x1 yesterday evening. Extensive work-up from RRT yesterday AM largely unremarkable with normal lactate, troponin, VBG, procalcitonin. CT shows no PE but has mildly increased bilateral infiltrates. Though clinically pt's hypoxic respiratory failure continues to improve. Pt down to 3L oxymask this AM. Pt feels \"ok\" today. Denies chest pain, palpitations. SOB and anxiety ongoing but at baseline.    4 Point ROS obtained and negative except as noted above.     Data reviewed today: I reviewed all medications, new labs and imaging results over the last 24 hours. I personally reviewed   Physical Exam   Vital Signs: Temp: 97.5  F (36.4  C) Temp src: Oral BP: 130/81 Pulse: 106   Resp: (!) 36 SpO2: 96 % O2 Device: None (Room air) Oxygen Delivery: 2 LPM  Weight: 165 lbs 5.52 oz   General Appearance: lying down in bed, drowsy but able to awaken. Visibly anxious upon awakening.  Eyes: no scleral icterus, EOMI  HEENT: neck supple, normocephalic, NC in place. Dry appearing mucous membranes   Respiratory: mildly increased WOB 3L oxymask with accessory muscle use, tachypneic to high 20s, fine crackles and mildly course breath sounds in bilateral lung fields anteriorly   Cardiovascular: Tachycardia, regular rhythm, normal S1/S2, no murmurs appreciated, intact distal pulses  GI: soft, non-tender, non-distended  Skin: scattered bruises over upper exposed upper extremities, no rash noted  Musculoskeletal: 1+ non-pitting edema left foot and distal shin; no edema of right foot.  Neurologic: Moves all extremities spontaneously, RUE mildly weaker (3/5) compared to LUE, follows commands.   Psychiatric: Anxious appearing     Data   Recent Labs   Lab 05/03/21  0502 05/02/21  0716 05/01/21  1839 05/01/21  0537 04/28/21  0831 04/28/21  0831 " 04/28/21  0535 04/27/21  0548 04/27/21  0548   WBC 9.3 10.4  --  10.1   < >  --  13.7*  --  13.2*   HGB 8.3* 8.6*  --  9.0*   < >  --  9.3*  --  8.7*   MCV 96 95  --  95   < >  --  91  --  89    200  --  204   < >  --  219  --  216   INR 0.99  --   --   --   --   --   --   --  1.02    139  --  138   < >  --  134  --  132*   POTASSIUM 3.6 3.8 4.7 3.4   < >  --  3.8   < > 3.6   CHLORIDE 103 103  --  103   < >  --  102  --  100   CO2 31 30  --  31   < >  --  26  --  25   BUN 35* 32*  --  34*   < >  --  16  --  16   CR 0.37* 0.37*  --  0.36*   < >  --  0.40*  --  0.36*   ANIONGAP 5 6  --  4   < >  --  6  --  8   ESTIVEN 8.8 8.5  --  8.4*   < >  --  8.7  --  8.5   * 128*  --  116*   < >  --  155*  --  166*   ALBUMIN 2.4* 2.2*  --  2.4*   < >  --  2.5*  --  2.3*   PROTTOTAL 5.2* 5.2*  --  5.2*   < >  --  5.4*  --  5.2*   BILITOTAL 0.3 0.4  --  0.4   < >  --  0.3  --  0.4   ALKPHOS 111 116  --  127   < >  --  116  --  126   ALT 44 50  --  55*   < >  --  82*  --  92*   AST 25 32  --  37   < >  --  35  --  37   TROPI  --  0.035  --   --   --  0.044 0.041   < >  --     < > = values in this interval not displayed.     Recent Results (from the past 24 hour(s))   CT Chest Pulmonary Embolism w Contrast    Narrative    EXAMINATION: CTA pulmonary angiogram, 5/2/2021 12:26 PM     COMPARISON: Chest x-ray 5/2/2021 at 10:24 AM, CT chest 4/19/2021    HISTORY: PE suspected, high probability, positive d-dimer.    TECHNIQUE: Volumetric helical acquisition of CT images of the chest  from the lung apices to the kidneys were acquired after the  administration of 80 mL of Isovue-370 IV contrast. Flash technique  with free breathing acquisition.  Post-processed multiplanar and/or  MIP reformations were obtained, archived to PACS and used in  interpretation of this study.     FINDINGS:  Contrast bolus is: excellent.  Exam is negative   for acute pulmonary embolism.       The largest right ventricle transaxial diameter is  (measured from  endocardium to endocardium): 4.6 cm   The largest left ventricle transaxial diameter is (measured from  endocardium to endocardium): 5.1 cm  RV/LV ratio is: 0.9 (if ratio greater than 1.1 then sign is suspicious  for right heart strain)  Reflux of contrast into the IVC? no    Paradoxical bowing of the interventricular septum to the left? no  Pericardial effusion?:not present    Drains: Right upper extremity PICC with tip projecting at the  cavoatrial junction.    Lungs and pleura: Extensive bilateral mixed interstitial and airspace  infiltrates, slightly worsened from previous exam. Trace pleural fluid  on the right, similar to previous. No effusion on left.    Mediastinum/axilla: No suspicious lymphadenopathy.    Upper abdomen: Partially visualized known right retroperitoneal  hemorrhage.      Impression    IMPRESSION:   1. Exam is negative for acute pulmonary embolism.       2. Extensive bilateral pulmonary infiltrates, increased from previous  exam.      In the event of a positive result for acute pulmonary embolism  resulting in right heart strain, please activate the PERT  Multidisciplinary group for consultation by paging [phone]).     PERT -- Pulmonary Embolism Response Team (Multidisciplinary team  including cardiology, interventional radiology, critical care,  hematology)    I have personally reviewed the examination and initial interpretation  and I agree with the findings.    MARILIN BERG MD   CT Head w/o Contrast    Narrative    CT HEAD W/O CONTRAST 5/2/2021 3:06 PM    History: Cerebral hemorrhage suspected. Glioblastoma s/p resection  in Feb 2021. Right upper extremity weakness    Comparison: Brain MRI dated 4/26/2021, head CT dated 4/14/2021    Technique: Using multidetector thin collimation helical acquisition  technique, axial, coronal and sagittal CT images from the skull base  to the vertex were obtained without intravenous contrast.   (topogram) image(s) also  obtained and reviewed.    Findings: Post surgical changes of left parietal craniotomy and  underlying mass resection. There is a small subdural fluid collection  at the resection bed measures up to 3 mm in maximal thickness, not  significantly changed from 4/14/2021, as well as 4/26/2021 given  differences in technique. There is no new intracranial hemorrhage,  mass effect, or midline shift. Extensive periventricular and  subcortical white matter hypodensities, nonspecific which may  represent chronic small vessel ischemic disease or posttreatment  changes. Gray/white matter differentiation in both cerebral  hemispheres is preserved. Ventricles are proportionate to the cerebral  sulci. Mild generalized cerebral atrophy The basal cisterns are clear.  Stable right occipital calcifications overlying occipital lobe, likely  representing a meningioma.    The orbits is unremarkable. The visualized portions of the paranasal  sinuses and mastoid air cells are clear.       Impression    Impression:  1. No acute intracranial pathology.   2. Stable postoperative changes of left parietal craniotomy and  underlying resection cavity. No significant change in small subdural  fluid collection along the resection bed measures up to 3 mm. No  midline shift or hydrocephalus.  3. Moderate leukoaraiosis.    I have personally reviewed the examination and initial interpretation  and I agree with the findings.    BIA GA MD

## 2021-05-03 NOTE — PLAN OF CARE
BP (!) 148/97 (BP Location: Left arm)   Pulse 110   Temp 98.8  F (37.1  C) (Axillary)   Resp 22   Wt 74.1 kg (163 lb 5.8 oz)   SpO2 100%   BMI 28.94 kg/m      Time: 9714-0759  Status:transferred from [facility] for inpatient rad/onc treatment while being treated for acute hypoxic respiratory failure and bilateral PNA. Hx of Glioblastoma s/p resection in 02/2021. Hx of depression/anxiety, HTN, asthma, GERD, and DVT.   Neuro:  A&Ox3, disoriented to time, very anxious, Ativan 0.5 mg iv once, Seroquel 25 mg at 1800, 50 mg at 2200 per pt request. Lethargic   Activity: lift device, PT/OT. Can turn in bed with minimal assist.   Pain: c/o generalized pain. Tylenol and scheduled Oxy 2.5 mg.   Cardiac: SR, denied chest pain. BP stable and afebrile.   Respiratory:face mask 5 L while awake, BiPAP 40% at night. Clear/, fine crackles to bilateral lower loebs. Deep breathing and coughing encouraged.   GI/: Incontinent of bowel x 1 this shift. Active bowel sound. Purewick working good for her. Bedding changed once.   Diet: DD1, nectar thick for fluid, 1:1 feeding assistance needed. Med crush and applesauce. TPN 31 ml continuous.   Skin: blanchable redness to rectum area. Bruises to left flank area, and bilateral UE.   LDAs: PICC trilple lumen infusing TPN and tko. Purewick.   Labs/Imaging: no replacement this shift.   New change this shift:. Hi's daughter updated.    Plan: will continue to monitor with the current POC.

## 2021-05-03 NOTE — PROGRESS NOTES
AVSS; NSR/ST. Weaned to 2L NC. Continues to have periods of dyspnea/anxiety. Pt reports feeling anxious about today's care conference and its outcome. AAOX4. Fair appetite. Incontinent of both urine and stool. LLE more swollen than RLE. Started on usama counts. Care conference done today. Continue to monitor and with POC, update team with changes/concerns.

## 2021-05-03 NOTE — CONSULTS
This note is placed to resolve previously placed consult. Please see Pulmonary consult note dated 4/27/2021 for full consult.    Cole Egan M.D.  Pulmonary and Critical Care Medicine Fellow  05/03/2021

## 2021-05-03 NOTE — PLAN OF CARE
BP (!) 141/79 (BP Location: Left arm)   Pulse 108   Temp 98.2  F (36.8  C) (Axillary)   Resp 20   Wt 75 kg (165 lb 5.5 oz)   SpO2 98%   BMI 29.29 kg/m      ST with heart rates in the low 100's. All other VSS. Afebrile. Lethargic most of shift. Has not needed any PRN anxiety medications. Bipap overnight at 40% FiO2. Has denied n/v. TPN infusing. Has denied pain. Repositioning q 2 hours. Purewick in place with adequate UOP. Incontinent of 1 small BM. Continue to monitor.

## 2021-05-03 NOTE — PROGRESS NOTES
Care Management Follow Up    Length of Stay (days): 7    Expected Discharge Date: 05/07/21     Concerns to be Addressed: discharge planning, care conference  Patient plan of care discussed at interdisciplinary rounds: Yes    Anticipated Discharge Disposition: Transitional Care  Anticipated Discharge Services: None  Anticipated Discharge DME: None    Patient/family educated on Medicare website which has current facility and service quality ratings: no  -Pt/dtr both would like referral to  TCU  Education Provided on the Discharge Plan: yes  Patient/Family in Agreement with the Plan: yes    Referrals Placed by CM/SW: Post Acute Facilities  Private pay costs discussed: Not applicable    Additional Information:  Care conference held today to provide medical update      -Treatment Team: [facility] (Dr. Sanabria), Palliative (Dr. Alfredo), HemOnc (Dr. Fair and Dr. Richardson), Social Work (Flavia [facility])  -Patient/Representatives: Patient, Pt's dtr ([place] - in person), Pt's sons (Garrison - by iPad)     HemOnc Team gave overview of Pt's medical history from original hospitalization in February to current status. It was discussed that d/t Pt's weakness they would not recommend concurrent radiation and chemo as was previously planned. They are able to offer radiation on it's own starting tomorrow 5/4; the plan would be to complete 15 fractions. Pt would receive daily radiation Mon-Fri and therefore would have a three week course of radiation. It was discussed that pending Pt's strength it may be recommended to start PO Temodar following completion of radiation if pt is strong enough to tolerate it. HemOnc explained that they would want to see Pt OOB for 50% of the day before they would offer chemo. Medical team anticipates 1-2 more weeks of hospitalization before Pt would be ready to transition to TCU. Referral is pending with  TCU.    SW to continue to follow and assist with discharge plan as  appropriate.      MACARIO Sorto, [facility]  6B Intermediate Care Unit   [facility]  Phone: [phone]  Pager: [phone]

## 2021-05-03 NOTE — PROGRESS NOTES
Radiation Oncology Chart Note    I discussed the plan to start radiation (3 weeks- 15 fx= 15 weekdays) tomorrow without chemotherapy with Ms. Bailey's daughter, LaN ena, and separately with Ms. Bailey, based on the CNS  tumor board recommendations. I will be in communication with Dr. Baker about Ms. Bailey's health so that Ms. Bailey can be considered for sytemtic therapy in the future, if possible.     Ms. Bailey is still interested in discussing the possibility of Optune treatment in the future. I will reach out to Dr. Baker to let her know.     From an oncologic prospective, whenever she is able, we would support her receiving the second COVID-19 vaccination.    The radiation department will be in contact tomorrow when it is time for treatment.     We reviewed the side-effects and expectations of therapy and all questions were answered.     I will see Ms. Bailey for her on treatment visit Thursdays in the department after her radiation therapy each week.     Please page with questions.     Betsy Spann MD, PhD  Department of Radiation Oncology  Pager: [phone]

## 2021-05-03 NOTE — TUMOR CONFERENCE
Tumor Conference Information  Tumor Conference: Brain  Specialties Present: Medical oncology, Pathology, Radiology, Radiation oncology, Surgery  Patient Status: A current patient  Pathology: Not Discussed  Treatment to Date: Surgical intervention(s)  Clinical Trial Eligibility: Not discussed  Recommended Plan: Post-operative radiation therapy (Comment: reviewed imaging; plan for post-op radiation without chemotherapy due to current antibiotic use, could consider chemo at a later date if performance status improves)  Did the review exceed 30 minutes?: did not           Documentation / Disclaimer Cancer Tumor Board Note  Cancer tumor board recommendations do not override what is determined to be reasonable care and treatment, which is dependent on the circumstances of a patient's case; the patient's medical, social, and personal concerns; and the clinical judgment of the oncologist [physician].

## 2021-05-03 NOTE — PROGRESS NOTES
[facility] - [facility]  Palliative Care Daily Progress Note       Recommendations & Counseling     Symptoms:   Pain- minimal  - continue acetaminophen as scheduled  - use oxycodone 2.5-5mg PO Q4H prn pain OR dyspnea (see below)      Dyspnea:   - oxycodone 2.5mg TID plus  oxycodone 2.5-5mg PO Q4H prn pain OR dyspnea  - continuing cares per ID, Pulm Med for her recent pneumonia/TRALI.     Anxiety/depression chronic, worsened by current medical situation  - continue steroid taper   - continue fluoxetine, buspirone; both higher doses and serotonergic.  Monitor for potential for serotonin syndrome.  Off bupropion since surgery d/t potential risl of seizure  - continue IV lorazepam 0.5mg IV q4H prn for acute anxiety/panic symptoms.   -seroquel 25mg am, 25mg midday, 50mg PM; consider adding additional PRN seroquel 25mg bid prn for additional anxiety and if needing regularly would increase dose of scheduled seroquel to work away from needing the IV lorazpeam  - health psychology to continue working with Olga     Insomnia  - added seroquel as above     Nausea:   - use compazine for nausea first line.  - consider not having prn ondansetron w 5HT risks     Constipation:  -appears stable on multimedication regimen; continue same and monitor with addition of scheduled low dose opiate. As her ileus improves would continue to reassess bowel regimen which is currently quite aggressive (lactulose, linaclotide, mesalamine suppository, miralax). Unclear to me why she is on mesalamine suppository, consider stopping?      Acute encephalopathy/delirium: multifactorial, concern for preexisting MCI possible; improving  - avoid deliriogenic meds as able   - attention to sleep hygiene, consider melatonin  - continue corticosteroid taper  - do not resume hydroxyzine at discharge.    Disease understanding/prognosis/Goals:  Participated today in a care conference with oncology and medicine teams together  with the patient her and her daughter in person as well as 2 sons on video call.  We reviewed her medical history recent complications and plan going forward.  In short the plan is to get radiation therapy started this hospitalization while she continues to recover from her lung injury and work on rehab.  Once her breathing and symptoms are stabilized she will be stable and ready for transfer to a rehab to continue to work on getting stronger.  Currently the plan for chemotherapy is on hold but can be reassessed in the future if her functional status improves.  She is aware that she has a terminal condition and that treatment is palliative in nature.  Her immediate goals are to get stronger so that she can get more treatment and ideally eventually be able to get back to living at home.  She knows that at some point the cancer will return and that she will eventually die likely related to the cancer problem progression but for now is eager to focus on restorative efforts.  On previous conversations she has clarified her wishes to be full code for now ( but does not want a tracheostomy or to be on mechanical ventilation for prolonged period.) code status was not readdressed today.      Support: daughter La Nena significant support to patient, also has 2 sons who are involved and eager to understand the care plan. She is also working with health psychology. Daughter notes ex= was not welcomed by the patient to be involved in the past and she requests that the patients' wishes be followed. There has been mention of some Familial discord chronically and difficult relationships between her children however today they were all involved in care conference and asking supportive questions.       Total time spent was 90 minutes,  >50% of time was spent counseling and/or coordination of care regarding goals and treatment plan, patient and family support, pain, anxiety and air hunger assessment for patient with GBM,  respiratory failure.  FTF time with care conference [phone], additional time with pre-conference [phone], additional time visiting patient and family outside of the care conference and documentation time.     Ashley Alfredo MD / Palliative Medicine / Text me via [facility].           Assessments          Olga Bailey is a 75 year old female with recent diagnosis of glioblastoma s/p resection 2/21 with prior depression/anxiety, HTN, asthma, GERD who was admitted to [facility] 3/26 and found to have PJP pneumonia, RANJIT, bilateral DVT, R retroperitoneal bleed and a month-long stay.  She then transferred from [facility] to [facility] 4/26 in hopes of receiving possible inpatient chemoradiation with temovar and radiation.  Her functional status has declined precipitously during the lengthy hospitalization.    Today, the patient was seen for:  Dyspnea  Anxiety  Support (pt and daughter)  Goals of care    Prognosis, Goals, or Advance Care Planning was addressed today with: No.  Mood, coping, and/or meaning in the context of serious illness were addressed today: Yes.               Interval History:     Chart review/discussion with unit or clinical team members:    did have rapid response over weekend related to dyspnea  Had stroke workup due to arm weakness, head CT negative for stroke      Per patient or family/caregivers today:  Denies pain  Breathing is stable today  Anxiety still an ongoing concern but more stable today- pt still worries about times when her anxiety gets out of controlled. Per family she has a trauma hx and worries about being out of control and not being able to make decisions for herself.     1-2 soft stools per day. Denies nausea or abdominal pain.     Taking some oral intake but small amounts. Low appetite. On tpn    Key Palliative Symptoms:  # Pain severity the last 12 hours: none  # Dyspnea severity the last 12 hours: moderate  # Nausea severity the last 12 hours: none  # Anxiety severity  the last 12 hours: moderate    Patient is on opioids: assessed and bowels ok/no needed changes to plan of care today.           Review of Systems:     Besides above, an additional 3 system ROS was reviewed and is unremarkable          Medications:     I have reviewed this patient's medication profile and medications during this hospitalization.    Noted meds:     APAP 975 q8H  Buspirone 30mg BID (PTA medication)  Doxepin 3mg at bedtime scheduled  (new this admission)  Fluoxetine 60mg (PTA was on 80mg)  Oxycodone 2.5mg tid and 2.5-5mg q 4 hours prn pain or dyspnea (no prn doses) (new this admission)  Pantoprazole IV bid  Prednisone taper  miralax daily - skipping some doses  Quetiapine 25mg am, 25mg midday and 50mg pm (new this admission)    Lactulose 3g bid  linaclotide daily  Mesalamine suppository daily  miralax daily - held most doses  Prn bisacodyl supp (none)    On TPN    PRN:  Lorazepam: 0.5mg IV q4 hours PRN anxiety-x 3 yesterday  Melatonin 6mg prn -none  Ondansetron -none  Oxycodone 2.5-5mg q 4 hours prn pain or dyspnea - none  Compazine -none                 Physical Exam:   BP (!) 143/80 (BP Location: Left arm)   Pulse 105   Temp 97.4  F (36.3  C) (Oral)   Resp 22   Wt 75 kg (165 lb 5.5 oz)   SpO2 99%   BMI 29.29 kg/m      Gen: sitting/lying in bed. Appears comfortable   HEENT: NCAT. Conjunctiva clear. Sclera anicteric .  CV: RRR , Peripheral perfusion intact.   Resp: slightly increased work of breathing, but much calmer and less tachypneic than days prior   Abd: soft, nt, nd, +BS   Msk: no gross deformity, no sarcopenia  Skin:  no jaundice  Ext: warm, well perfused.   Neuro: face symmetric. EOM, vision, hearing grossly intact. Speech fluent. Moves all extremities   Mental status/Psych: alert. Oriented. Asks/answers questions appropriately. Affect is mildly anxious but much more calm and smiling at times             Data Reviewed:     Reviewed recent pertinent imaging, comments:   CXR  5/2  Impression: Diffuse mixed pulmonary opacities, slightly increased in  the right lung. Favor infection, ARDS, or pulmonary edema.     CT chest 5/2  IMPRESSION:   1. Exam is negative for acute pulmonary embolism.        2. Extensive bilateral pulmonary infiltrates, increased from previous  Exam.    CT head 5/2  Impression:  1. No acute intracranial pathology.   2. Stable postoperative changes of left parietal craniotomy and  underlying resection cavity. No significant change in small subdural  fluid collection along the resection bed measures up to 3 mm. No  midline shift or hydrocephalus.  3. Moderate leukoaraiosis.    Reviewed recent labs, comments:   VBG 5/2: pCO2 48, PO2 36    Cr 0.37, GFR>90  Albumin 2.4  WBC 9.3  Hg 8.3

## 2021-05-03 NOTE — PROGRESS NOTES
Hematology / Oncology  Daily Progress Note   Date of Service: 05/03/2021  Patient: Olga Bailey  MRN: [id number]  Admission Date: 4/26/2021  Hospital Day # 7  Cancer Diagnosis: Glioblastoma  Primary Outpatient Oncologist: Dr. Baker  Current Treatment Plan: S/p tumor resection 2/23/21. Plan is to undergo radiation; tentative start date 5/4.     Assessment & Plan:   Olga Bailey is a 75 year old year old female with history of left frontoparietal glioblastoma s/p tumor resection 2/23/21. The plan was to undergo adjuvant chemoradiation, which has been held for prolonged admission at [facility] (3/26-4/26/21) with pneumonia and multiple DVTs, complicated by retroperitoneal hemorrhage and shock s/p IVC filter placement. She was transferred to [facility] on 4/26/21 for further Oncology and Radiation Oncology care.      Recommendations:   - Care conference held today (5/3) at 1300 with primary team, palliative care, heme/onc team,  and patient's family. Updated on current status/hospitalization and proposed treatment plan of 15 fractions over 15 weekdays (starting 5/4). Oral chemotherapy (Temodar) could with considered with improvement in performance status, however would not be feasible at this juncture. Patient will be able to start radiation while inpatient and could continue if she were to transition to  TCU. Questions answered at bedside. Will arrange for visit with Dr. Baker to discuss Temodar after completion of radiation.  - Regarding steroids, taper previously per pulm/ID of prednisone 20 mg today, followed by 10 mg tomorrow (5/4). In conjunction with radiation, please transition to Dexamethasone 4 mg daily starting 5/4.   - Continue Seroquel for anxiety. Appreciate psychiatry, health psychology input.  - Appreciate PT/OT/SLP input.     # Glioblastoma Multiforme  Follows with Dr. Baker. For further detail of oncologic history, please see below. In summary, patient was diagnosed with a left  frontoparietal brain glioblastoma s/p resection 2/23/2021. Seen by Dr. Baker on 3/23, recommended temozolomide and radiation however treatment has not been initiated yet due to prolonged admission at [facility] with pneumonia, B/l LE DVTs, and RP hemorrhage. Ultimately transferred to [facility] where she could receive radiation treatment as appropriate.    - In the setting of current poor performance status, she is not a candidate for Temodar at this time. If patient's performance status greatly improves, we could consider starting temozolomide as determined by Dr. Baker.   - Radiation Oncology consulted. Seen by Dr. Spann via iPad visit 4/29. Will plan to discuss at tumor board on 5/3 AM, followed by care conference 5/3 PM. See above for details of conversation.     # Bilateral LE DVTs  # Retroperitoneal bleed  B/l LE DVTs noted on 3/29/21.   - Started therapeutic anticoagulation. C/b right retroperitoneal bleed 4/14/21 and subsequently developed acute hypoxic respiratory failure.   - IVC filter placed with IR on 4/16/21.   - Bilateral LE ultrasound showed improvement of previously diagnosed DVTs, though not resolution   - keep IVC filter   - anticoagulation per primary team, but ok to continue ppx lovenox    # SOB  # Dyspnea  # Recent PJP pneumonia  # Anxiety  Olga continues to have SOB and tachypnea. Breathing frequently. She continues to struggle with anxiety at night and starts to breath very quickly which tires her out and requires her to be on BiPap. Slept well after she had difficulty with anxiety.    - ECHO showed hyperkinetic with EF >70%  - CXR has stable mixed opacities bilaterally  - With medical workup fairly unremarkable, concern remains an element of anxiety causing her to become tachypneic. Psychiatry consulted (4/29) with recommendation to initiate Seroquel 12.5 - 25 mg AM and 1800 scheduled; hold if somnolent. Can always increase or trial 25 - 50 mg at bedtime as well. Recommend health psychology  consultation as well.     # Malnutrition  Regarding patient's nutrition status, currently on TPN. Pending the results of the swallow study, we could consider a PEG tube if patient wants to continue to pursue treatment vs TPN   - This ultimately would warrant a GOC conversation as her goals of treatment would dictate whether we continue TPN or transition to PEG tube (this is also dependent on her ability to restore her own swallow function)  - If there are signs that she could continue to have restorative function to her swallowing, would continue TPN in the setting that she would be able to eat by mouth eventually     We will continue to follow this patient. Please do not hesitate to page with any questions or concerns.     Patient was seen and plan of care was discussed with attending physician Dr. Fair.     Radha Richardson PA-C  Hematology/Oncology   Pager: [phone]    Oncologic History:  - 2/2021 PRESENTATION: Progressive aphasia.   - 2/19/2021 MR brain imaging with 3.3 x 2.8 x 2.8 cm enhancing mass in left frontal-parietal region. A second contrast enhancing lesion (0.5 x 1.0 x 1.0 cm) in right occipital lobe which is dural based and largely stable in size since 9/2011; likely representing a meningioma.  - 2/23/2021 SURGERY: Gross total resection by Dr. Cummings  PATHOLOGY: Glioblastoma; IDH1-R132H wild-type/ IDH 1 and 2 wildtype, MGMT promoter methylated. Not BRAF mutated.   - 3/23/2021 Established care with Dr. Baker. Plan was to initiate chemotherapy with temozolomide 75mg/m2 (140mg) daily in the evening until completion of radiation. Temozolomide held for admission to [facility]. Adjuvant radiation was planned at [facility], held for dyspnea and subsequently admitted to [facility] with acute hypoxic respiratory failure and pneumonia.   __________________________________________________________________    Subjective & Interval History:    Another episode of hypoxia/tachypnea noted overnight, now resolved. Seen  sitting up in chair, significantly brighter than previous exams. She feels comfortable breathing currently, alternating between BiPAP and NC. She worked with PT this morning. Ate small amount of oatmeal this morning. Encouraged to continue working with therapies. Care conference to be held this afternoon. Denies any new or worsening symptoms.     Complete and Comprehensive review of systems review and negative other than noted here or in the HPI.     Physical Exam:    Blood pressure 130/81, pulse 106, temperature 97.5  F (36.4  C), temperature source Oral, resp. rate (!) 36, weight 75 kg (165 lb 5.5 oz), SpO2 96 %.    Constitutional: Pleasant female lying in bed.Tired but comfortable Non- toxic appearing. No acute distress.   HEENT: Normocephalic, atraumatic. Sclerae anicteric. EOM intact.   Respiratory: Mildly tachypneic, NC in place.   Cardiovascular: Appears well perfused  Skin: Skin is clean, dry, intact. No jaundice appreciated.   Musculoskeletal/ Extremities: No redness, warmth, or swelling of the joints appreciated. Distal pulses palpable. Nailbeds pink and without cyanosis or clubbing.   Neurologic: Alert and oriented. Speech normal. Grossly nonfocal. Memory and thought process preserved.   Neuropsychiatric: Calm, affect congruent to situation. Appropriate mood and affect. Good judgment and insight. No visual/auditory hallucinations.       Labs & Studies: I personally reviewed the following studies:  ROUTINE LABS (Last four results):  CMP  Recent Labs   Lab 05/03/21  0502 05/02/21  0716 05/01/21  1839 05/01/21  0537 04/30/21  0458    139  --  138 137   POTASSIUM 3.6 3.8 4.7 3.4 3.5   CHLORIDE 103 103  --  103 102   CO2 31 30  --  31 29   ANIONGAP 5 6  --  4 6   * 128*  --  116* 120*   BUN 35* 32*  --  34* 27   CR 0.37* 0.37*  --  0.36* 0.39*   GFRESTIMATED >90 >90  --  >90 >90   GFRESTBLACK >90 >90  --  >90 >90   ESTIVEN 8.8 8.5  --  8.4* 8.5   MAG 2.2 2.2  --  2.1 2.1   PHOS 3.2 2.6  --  3.0 2.7    PROTTOTAL 5.2* 5.2*  --  5.2* 5.0*   ALBUMIN 2.4* 2.2*  --  2.4* 2.4*   BILITOTAL 0.3 0.4  --  0.4 0.4   ALKPHOS 111 116  --  127 117   AST 25 32  --  37 37   ALT 44 50  --  55* 58*     CBC  Recent Labs   Lab 05/03/21  0502 05/02/21  0716 05/01/21  0537 04/30/21  0458   WBC 9.3 10.4 10.1 11.5*   RBC 2.91* 2.97* 3.03* 2.89*   HGB 8.3* 8.6* 9.0* 8.7*   HCT 28.0* 28.3* 28.9* 27.3*   MCV 96 95 95 95   MCH 28.5 29.0 29.7 30.1   MCHC 29.6* 30.4* 31.1* 31.9   RDW 20.3* 19.9* 19.5* 19.0*    200 204 208     INR  Recent Labs   Lab 05/03/21  0502 04/27/21  0548   INR 0.99 1.02       Medications list for reference:  Current Facility-Administered Medications   Medication     acetaminophen (TYLENOL) tablet 975 mg     acyclovir (ZOVIRAX) 5 % ointment     albuterol (PROAIR HFA/PROVENTIL HFA/VENTOLIN HFA) 108 (90 Base) MCG/ACT inhaler 2 puff     amLODIPine (NORVASC) tablet 10 mg     bisacodyl (DULCOLAX) Suppository 10 mg     busPIRone (BUSPAR) tablet 30 mg     carboxymethylcellulose PF (REFRESH PLUS) 0.5 % ophthalmic solution 1 drop     dextrose 10% infusion     glucose gel 15-30 g    Or     dextrose 50 % injection 25-50 mL    Or     glucagon injection 1 mg     doxepin (SINEquan) 10 MG/ML (HIGH CONC) solution 3 mg     enoxaparin ANTICOAGULANT (LOVENOX) injection 40 mg     FLUoxetine (PROzac) capsule 60 mg     furosemide (LASIX) injection 20 mg     insulin aspart (NovoLOG) injection (RAPID ACTING)     insulin aspart (NovoLOG) injection (RAPID ACTING)     lactulose (CHRONULAC) solution 3 g     levETIRAcetam (KEPPRA) 250 mg in sodium chloride 0.9 % 100 mL intermittent infusion     lidocaine (LMX4) cream     lidocaine 1 % 0.1-1 mL     linaclotide (LINZESS) capsule 290 mcg     lipids (INTRALIPID) 20 % infusion 250 mL     LORazepam (ATIVAN) injection 0.5 mg     melatonin tablet 6 mg     mesalamine (CANASA) Suppository 1,000 mg     naloxone (NARCAN) injection 0.2 mg    Or     naloxone (NARCAN) injection 0.4 mg    Or     naloxone  (NARCAN) injection 0.2 mg    Or     naloxone (NARCAN) injection 0.4 mg     ondansetron (ZOFRAN) injection 4 mg     oxyCODONE IR (ROXICODONE) half-tab 2.5 mg     oxyCODONE IR (ROXICODONE) half-tab 2.5-5 mg     pantoprazole (PROTONIX) IV push injection 40 mg     parenteral nutrition - ADULT compounded formula CYCLE     parenteral nutrition - ADULT compounded formula     polyethylene glycol (MIRALAX) Packet 17 g     [START ON 5/5/2021] predniSONE (DELTASONE) tablet 10 mg    Followed by     [START ON 5/4/2021] predniSONE (DELTASONE) tablet 20 mg     prochlorperazine (COMPAZINE) injection 5 mg     QUEtiapine (SEROquel) tablet 25 mg     QUEtiapine (SEROquel) tablet 50 mg     sennosides (SENOKOT) tablet 8.6 mg     simethicone (MYLICON) suspension 40 mg     sodium chloride (PF) 0.9% PF flush 10 mL     sodium chloride (PF) 0.9% PF flush 10 mL     sodium chloride (PF) 0.9% PF flush 10 mL     sodium chloride (PF) 0.9% PF flush 10 mL     sulfamethoxazole-trimethoprim (BACTRIM) 80 mg in D5W 115 mL intermittent infusion

## 2021-05-04 ENCOUNTER — APPOINTMENT (OUTPATIENT)
Dept: OCCUPATIONAL THERAPY | Facility: CLINIC | Age: 76
DRG: 177 | End: 2021-05-04
Attending: STUDENT IN AN ORGANIZED HEALTH CARE EDUCATION/TRAINING PROGRAM
Payer: MEDICARE

## 2021-05-04 ENCOUNTER — ALLIED HEALTH/NURSE VISIT (OUTPATIENT)
Dept: RADIATION ONCOLOGY | Facility: CLINIC | Age: 76
End: 2021-05-04
Attending: STUDENT IN AN ORGANIZED HEALTH CARE EDUCATION/TRAINING PROGRAM
Payer: MEDICARE

## 2021-05-04 ENCOUNTER — APPOINTMENT (OUTPATIENT)
Dept: PHYSICAL THERAPY | Facility: CLINIC | Age: 76
DRG: 177 | End: 2021-05-04
Attending: STUDENT IN AN ORGANIZED HEALTH CARE EDUCATION/TRAINING PROGRAM
Payer: MEDICARE

## 2021-05-04 ENCOUNTER — APPOINTMENT (OUTPATIENT)
Dept: SPEECH THERAPY | Facility: CLINIC | Age: 76
DRG: 177 | End: 2021-05-04
Attending: STUDENT IN AN ORGANIZED HEALTH CARE EDUCATION/TRAINING PROGRAM
Payer: MEDICARE

## 2021-05-04 LAB
ALBUMIN SERPL-MCNC: 2.4 G/DL (ref 3.4–5)
ALP SERPL-CCNC: 110 U/L (ref 40–150)
ALT SERPL W P-5'-P-CCNC: 40 U/L (ref 0–50)
ANION GAP SERPL CALCULATED.3IONS-SCNC: 3 MMOL/L (ref 3–14)
AST SERPL W P-5'-P-CCNC: 26 U/L (ref 0–45)
BACTERIA SPEC CULT: NO GROWTH
BACTERIA SPEC CULT: NO GROWTH
BILIRUB SERPL-MCNC: 0.4 MG/DL (ref 0.2–1.3)
BUN SERPL-MCNC: 35 MG/DL (ref 7–30)
CALCIUM SERPL-MCNC: 8.8 MG/DL (ref 8.5–10.1)
CHLORIDE SERPL-SCNC: 106 MMOL/L (ref 94–109)
CO2 SERPL-SCNC: 32 MMOL/L (ref 20–32)
CREAT SERPL-MCNC: 0.32 MG/DL (ref 0.52–1.04)
ERYTHROCYTE [DISTWIDTH] IN BLOOD BY AUTOMATED COUNT: 20.4 % (ref 10–15)
GFR SERPL CREATININE-BSD FRML MDRD: >90 ML/MIN/{1.73_M2}
GLUCOSE BLDC GLUCOMTR-MCNC: 116 MG/DL (ref 70–99)
GLUCOSE BLDC GLUCOMTR-MCNC: 153 MG/DL (ref 70–99)
GLUCOSE BLDC GLUCOMTR-MCNC: 203 MG/DL (ref 70–99)
GLUCOSE BLDC GLUCOMTR-MCNC: 239 MG/DL (ref 70–99)
GLUCOSE SERPL-MCNC: 152 MG/DL (ref 70–99)
HCT VFR BLD AUTO: 28.2 % (ref 35–47)
HGB BLD-MCNC: 8.4 G/DL (ref 11.7–15.7)
LABORATORY COMMENT REPORT: NORMAL
LACTATE BLD-SCNC: 2 MMOL/L (ref 0.7–2)
MAGNESIUM SERPL-MCNC: 2.1 MG/DL (ref 1.6–2.3)
MCH RBC QN AUTO: 29.3 PG (ref 26.5–33)
MCHC RBC AUTO-ENTMCNC: 29.8 G/DL (ref 31.5–36.5)
MCV RBC AUTO: 98 FL (ref 78–100)
PHOSPHATE SERPL-MCNC: 3 MG/DL (ref 2.5–4.5)
PLATELET # BLD AUTO: 195 10E9/L (ref 150–450)
POTASSIUM SERPL-SCNC: 3.8 MMOL/L (ref 3.4–5.3)
PROT SERPL-MCNC: 5.1 G/DL (ref 6.8–8.8)
RBC # BLD AUTO: 2.87 10E12/L (ref 3.8–5.2)
SARS-COV-2 RNA RESP QL NAA+PROBE: NEGATIVE
SODIUM SERPL-SCNC: 141 MMOL/L (ref 133–144)
SPECIMEN SOURCE: NORMAL
WBC # BLD AUTO: 8.7 10E9/L (ref 4–11)

## 2021-05-04 PROCEDURE — 36592 COLLECT BLOOD FROM PICC: CPT | Performed by: INTERNAL MEDICINE

## 2021-05-04 PROCEDURE — 99233 SBSQ HOSP IP/OBS HIGH 50: CPT | Mod: GC | Performed by: INTERNAL MEDICINE

## 2021-05-04 PROCEDURE — 84100 ASSAY OF PHOSPHORUS: CPT | Performed by: STUDENT IN AN ORGANIZED HEALTH CARE EDUCATION/TRAINING PROGRAM

## 2021-05-04 PROCEDURE — 999N000157 HC STATISTIC RCP TIME EA 10 MIN

## 2021-05-04 PROCEDURE — U0003 INFECTIOUS AGENT DETECTION BY NUCLEIC ACID (DNA OR RNA); SEVERE ACUTE RESPIRATORY SYNDROME CORONAVIRUS 2 (SARS-COV-2) (CORONAVIRUS DISEASE [COVID-19]), AMPLIFIED PROBE TECHNIQUE, MAKING USE OF HIGH THROUGHPUT TECHNOLOGIES AS DESCRIBED BY CMS-2020-01-R: HCPCS | Performed by: STUDENT IN AN ORGANIZED HEALTH CARE EDUCATION/TRAINING PROGRAM

## 2021-05-04 PROCEDURE — 258N000003 HC RX IP 258 OP 636: Performed by: STUDENT IN AN ORGANIZED HEALTH CARE EDUCATION/TRAINING PROGRAM

## 2021-05-04 PROCEDURE — C9113 INJ PANTOPRAZOLE SODIUM, VIA: HCPCS

## 2021-05-04 PROCEDURE — 999N001017 HC STATISTIC GLUCOSE BY METER IP

## 2021-05-04 PROCEDURE — 80053 COMPREHEN METABOLIC PANEL: CPT | Performed by: STUDENT IN AN ORGANIZED HEALTH CARE EDUCATION/TRAINING PROGRAM

## 2021-05-04 PROCEDURE — 120N000003 HC R&B IMCU UMMC

## 2021-05-04 PROCEDURE — 77386 HC IMRT TREATMENT DELIVERY, COMPLEX: CPT | Performed by: RADIOLOGY

## 2021-05-04 PROCEDURE — 250N000013 HC RX MED GY IP 250 OP 250 PS 637: Performed by: STUDENT IN AN ORGANIZED HEALTH CARE EDUCATION/TRAINING PROGRAM

## 2021-05-04 PROCEDURE — 250N000013 HC RX MED GY IP 250 OP 250 PS 637: Performed by: INTERNAL MEDICINE

## 2021-05-04 PROCEDURE — 97535 SELF CARE MNGMENT TRAINING: CPT | Mod: GO

## 2021-05-04 PROCEDURE — 250N000011 HC RX IP 250 OP 636: Performed by: STUDENT IN AN ORGANIZED HEALTH CARE EDUCATION/TRAINING PROGRAM

## 2021-05-04 PROCEDURE — 83605 ASSAY OF LACTIC ACID: CPT | Performed by: INTERNAL MEDICINE

## 2021-05-04 PROCEDURE — 250N000012 HC RX MED GY IP 250 OP 636 PS 637: Performed by: STUDENT IN AN ORGANIZED HEALTH CARE EDUCATION/TRAINING PROGRAM

## 2021-05-04 PROCEDURE — 250N000009 HC RX 250: Performed by: HOSPITALIST

## 2021-05-04 PROCEDURE — 85027 COMPLETE CBC AUTOMATED: CPT | Performed by: STUDENT IN AN ORGANIZED HEALTH CARE EDUCATION/TRAINING PROGRAM

## 2021-05-04 PROCEDURE — 97110 THERAPEUTIC EXERCISES: CPT | Mod: GO

## 2021-05-04 PROCEDURE — 92526 ORAL FUNCTION THERAPY: CPT | Mod: GN | Performed by: SPEECH-LANGUAGE PATHOLOGIST

## 2021-05-04 PROCEDURE — U0005 INFEC AGEN DETEC AMPLI PROBE: HCPCS | Performed by: STUDENT IN AN ORGANIZED HEALTH CARE EDUCATION/TRAINING PROGRAM

## 2021-05-04 PROCEDURE — 82962 GLUCOSE BLOOD TEST: CPT

## 2021-05-04 PROCEDURE — 83735 ASSAY OF MAGNESIUM: CPT | Performed by: STUDENT IN AN ORGANIZED HEALTH CARE EDUCATION/TRAINING PROGRAM

## 2021-05-04 PROCEDURE — 250N000009 HC RX 250: Performed by: INTERNAL MEDICINE

## 2021-05-04 PROCEDURE — 97530 THERAPEUTIC ACTIVITIES: CPT | Mod: GP

## 2021-05-04 PROCEDURE — 77014 PR CT GUIDE FOR PLACEMENT RADIATION THERAPY FIELDS: CPT | Mod: 26 | Performed by: STUDENT IN AN ORGANIZED HEALTH CARE EDUCATION/TRAINING PROGRAM

## 2021-05-04 PROCEDURE — 250N000011 HC RX IP 250 OP 636

## 2021-05-04 PROCEDURE — G0463 HOSPITAL OUTPT CLINIC VISIT: HCPCS

## 2021-05-04 PROCEDURE — 94660 CPAP INITIATION&MGMT: CPT

## 2021-05-04 PROCEDURE — 36415 COLL VENOUS BLD VENIPUNCTURE: CPT | Performed by: STUDENT IN AN ORGANIZED HEALTH CARE EDUCATION/TRAINING PROGRAM

## 2021-05-04 RX ORDER — FUROSEMIDE 20 MG
40 TABLET ORAL DAILY
Status: DISCONTINUED | OUTPATIENT
Start: 2021-05-05 | End: 2021-05-15 | Stop reason: HOSPADM

## 2021-05-04 RX ORDER — SULFAMETHOXAZOLE AND TRIMETHOPRIM 400; 80 MG/1; MG/1
1 TABLET ORAL DAILY
Status: DISCONTINUED | OUTPATIENT
Start: 2021-05-05 | End: 2021-05-15 | Stop reason: HOSPADM

## 2021-05-04 RX ADMIN — AMLODIPINE BESYLATE 10 MG: 10 TABLET ORAL at 08:16

## 2021-05-04 RX ADMIN — FLUOXETINE HYDROCHLORIDE 60 MG: 40 CAPSULE ORAL at 08:17

## 2021-05-04 RX ADMIN — Medication 2.5 MG: at 13:45

## 2021-05-04 RX ADMIN — QUETIAPINE 25 MG: 25 TABLET ORAL at 17:15

## 2021-05-04 RX ADMIN — LACTULOSE 3 G: 20 SOLUTION ORAL at 08:19

## 2021-05-04 RX ADMIN — SULFAMETHOXAZOLE AND TRIMETHOPRIM 80 MG: 80; 16 INJECTION, SOLUTION, CONCENTRATE INTRAVENOUS at 08:20

## 2021-05-04 RX ADMIN — ACETAMINOPHEN 975 MG: 325 TABLET, FILM COATED ORAL at 13:45

## 2021-05-04 RX ADMIN — ACYCLOVIR: 50 OINTMENT TOPICAL at 05:11

## 2021-05-04 RX ADMIN — QUETIAPINE FUMARATE 25 MG: 25 TABLET ORAL at 12:41

## 2021-05-04 RX ADMIN — LINACLOTIDE 290 MCG: 145 CAPSULE, GELATIN COATED ORAL at 08:16

## 2021-05-04 RX ADMIN — LORAZEPAM 0.5 MG: 2 INJECTION INTRAMUSCULAR; INTRAVENOUS at 11:20

## 2021-05-04 RX ADMIN — ACYCLOVIR: 50 OINTMENT TOPICAL at 08:24

## 2021-05-04 RX ADMIN — INSULIN ASPART 1 UNITS: 100 INJECTION, SOLUTION INTRAVENOUS; SUBCUTANEOUS at 08:52

## 2021-05-04 RX ADMIN — ACYCLOVIR: 50 OINTMENT TOPICAL at 17:10

## 2021-05-04 RX ADMIN — LACTULOSE 3 G: 20 SOLUTION ORAL at 17:16

## 2021-05-04 RX ADMIN — ENOXAPARIN SODIUM 40 MG: 100 INJECTION SUBCUTANEOUS at 17:11

## 2021-05-04 RX ADMIN — LORAZEPAM 0.5 MG: 2 INJECTION INTRAMUSCULAR; INTRAVENOUS at 05:07

## 2021-05-04 RX ADMIN — Medication: at 20:16

## 2021-05-04 RX ADMIN — ACYCLOVIR: 50 OINTMENT TOPICAL at 11:20

## 2021-05-04 RX ADMIN — Medication 2.5 MG: at 20:33

## 2021-05-04 RX ADMIN — LEVETIRACETAM 250 MG: 100 INJECTION, SOLUTION, CONCENTRATE INTRAVENOUS at 10:04

## 2021-05-04 RX ADMIN — PANTOPRAZOLE SODIUM 40 MG: 40 INJECTION, POWDER, FOR SOLUTION INTRAVENOUS at 08:57

## 2021-05-04 RX ADMIN — ACETAMINOPHEN 975 MG: 325 TABLET, FILM COATED ORAL at 08:15

## 2021-05-04 RX ADMIN — DEXAMETHASONE 4 MG: 4 TABLET ORAL at 08:17

## 2021-05-04 RX ADMIN — Medication 2.5 MG: at 09:01

## 2021-05-04 RX ADMIN — ACETAMINOPHEN 975 MG: 325 TABLET, FILM COATED ORAL at 20:33

## 2021-05-04 RX ADMIN — LEVETIRACETAM 250 MG: 100 INJECTION, SOLUTION, CONCENTRATE INTRAVENOUS at 21:59

## 2021-05-04 RX ADMIN — BUSPIRONE HYDROCHLORIDE 30 MG: 15 TABLET ORAL at 20:33

## 2021-05-04 RX ADMIN — I.V. FAT EMULSION 250 ML: 20 EMULSION INTRAVENOUS at 20:31

## 2021-05-04 RX ADMIN — PANTOPRAZOLE SODIUM 40 MG: 40 TABLET, DELAYED RELEASE ORAL at 17:13

## 2021-05-04 RX ADMIN — FUROSEMIDE 20 MG: 10 INJECTION, SOLUTION INTRAMUSCULAR; INTRAVENOUS at 08:18

## 2021-05-04 RX ADMIN — QUETIAPINE 25 MG: 25 TABLET ORAL at 08:16

## 2021-05-04 RX ADMIN — INSULIN ASPART 1 UNITS: 100 INJECTION, SOLUTION INTRAVENOUS; SUBCUTANEOUS at 11:24

## 2021-05-04 RX ADMIN — BUSPIRONE HYDROCHLORIDE 30 MG: 15 TABLET ORAL at 08:18

## 2021-05-04 RX ADMIN — MESALAMINE 1000 MG: 1000 SUPPOSITORY RECTAL at 21:48

## 2021-05-04 RX ADMIN — ACYCLOVIR: 50 OINTMENT TOPICAL at 03:52

## 2021-05-04 ASSESSMENT — ACTIVITIES OF DAILY LIVING (ADL)
ADLS_ACUITY_SCORE: 19
ADLS_ACUITY_SCORE: 25
ADLS_ACUITY_SCORE: 25
ADLS_ACUITY_SCORE: 19
ADLS_ACUITY_SCORE: 19
ADLS_ACUITY_SCORE: 24

## 2021-05-04 NOTE — PROGRESS NOTES
CLINICAL NUTRITION SERVICES - REASSESSMENT NOTE     Nutrition Prescription    RECOMMENDATIONS FOR MDs/PROVIDERS TO ORDER:  Encourage oral intake     Malnutrition Status:    Non-severe malnutrition in the context of acute illness     Recommendations already ordered by Registered Dietitian (RD):  Calorie count ordered 5/4-5/6  Continue magic cup   Continue cycled parenteral nutrition over 18 hours     Future/Additional Recommendations:  If patient is able to consume ~60% of median energy needs and 70% of protein need  (850 kcal and 48 g protein) discontinue TPN.   --If patient is not meeting needs, recommend starting additional supplements and further discussion of enteral nutrition as it is more physiologic will support immune function and reduces infection risk now that GI tract is functional     If enteral nutrition becomes plan of care: recommend starting Osmolite 1.5 @ 15 ml/hr and advance by 15 Ml q 4 hours to goal. Osmolite 1.5 Claudio @ goal of 45ml/hr  (1080ml/day)  will provide: 1620 kcals, 67 g PRO, 822 ml free H20, 219 g CHO, and 0 g fiber daily.     EVALUATION OF THE PROGRESS TOWARD GOALS   Diet: Dysphagia Level 1:  Pureed + nectar thickened liquids + magic cup between meals   Intake: 75% (pudding) 100% (ice cream)     Nutrition Support: 744 mL, Dex 200 g, AA 75 g and 250 mL 20% lipid 6x per week = 1408 kcal (~25 kcal/kg), 1.3 g/kg AA and 30% kcal from fat   Intake: TPN meeting 100% of low end estimated energy and protein needs.       NEW FINDINGS   Patient sleeping soundly, did not wake to voice.     Weight Trends:  05/04/21 0649 74 kg (163 lb 2.3 oz)   05/04/21 0331 74 kg (163 lb 2.3 oz)   05/03/21 0300 75 kg (165 lb 5.5 oz)   05/01/21 0457 74.1 kg (163 lb 5.8 oz)   04/30/21 0618 73.1 kg (161 lb 2.5 oz)   04/29/21 0630 71 kg (156 lb 8.4 oz)   04/28/21 0457 73.8 kg (162 lb 11.2 oz)   04/27/21 0138 74.3 kg (163 lb 12.8 oz)   04/26/21 1500 75.1 kg (165 lb 9.1 oz)   Will maintain dosing weight of 56.7 kg  (adjusted)     GI: Non nausea noted. Last bowel movement, 5/3, 5 stools  documented.   Skin: WOCN consulted for possible PI on BL buttocks, coccyx/sacrum and cheek- see note for details   Labs: Glucose 117-239 (past 4)   Medications: Novolog, lactulose, Miralax (held)   Palliative: Care conference yesterday. Primary team discussed feeding tube- patient/family not interested at this time.     MALNUTRITION  % Intake: No decreased intake noted (with TPN)   % Weight Loss: None noted  Subcutaneous Fat Loss: Facial region: Mild (visual)  Muscle Loss: Upper leg (quadricep, hamstring):  mild and Posterior calf:  Mild (visual)   Fluid Accumulation/Edema: Mild  Malnutrition Diagnosis: Non-severe malnutrition in the context of acute illness     Previous Goals   Total avg nutritional intake to meet a minimum of 20 kcal/kg and 1.2 g PRO/kg daily (per dosing wt 56.7 kg).  Evaluation: Met    Previous Nutrition Diagnosis  Inadequate oral intake related to altered GI function as evidenced by recent ileus and NPO status with start of TPN, now with improvement in ileus per imaging      Evaluation: Improving    CURRENT NUTRITION DIAGNOSIS  Inadequate oral intake related to prolonged NPO status and nutrition support as evidenced by intakes documented of applesauce and pudding     INTERVENTIONS  Implementation  Collaboration with other providers  Parenteral nutrition   Medical food supplement     Goals  Total avg nutritional intake to meet a minimum of 15  kcal/kg and 0.7 g PRO/kg daily (per dosing wt 56.7 kg) - 60% of energy needs and 70% of protein needs via oral intake (to discontinue nutrition support)    Monitoring/Evaluation  Progress toward goals will be monitored and evaluated per protocol.    Chaya Fontana RD, BENITO  6B pager: [phone]

## 2021-05-04 NOTE — PLAN OF CARE
Neuro: A&Ox3, lethargic, arouses to voice. Pt expressed anxiety after waking up at 0500, requested prn ativan.  Cardiac: SR-ST. VSS.   Respiratory: Sating adequately on BIPAP 40% throughout the night, titrated down to 35%. Woke up at 0500 and tore off her BIPAP, said she wanted a break from the BIPAP, placed on 4L NC.   GI/: Adequate urine output, purewick in place.  Diet/appetite: Tolerating modified texture diet.  Activity:  Assist of lift.  Pain: At acceptable level on current regimen.   Skin: No new deficits noted. Repositioned frequently throughout the night.  LDA's: PICC with TPN/lipids.    Plan: Continue with POC. Notify primary team with changes.

## 2021-05-04 NOTE — CONSULTS
Focus- BL buttocks, coccyx and sacrum, and BL cheeks  D. Received consult for suspected pressure injury on BL cheeks.   Assessed the area and noted linear blanchable erythema on intergluteal cleft r/t moisture. Buttocks epidermis is intact.   Also noted BL cheeks with superficial skin tears, 1 x 0.5x0.1cm. Not consistent with pressure. Wears BI-pap at night with full mask. Currently on nasal cannula. No pressure injury detected. Able to shift weight from side to side. Educated pt on pressure injury, risk factors, and preventive measures. Verbalized understanding, however unable to keep HOB lower than 30 degree due to breathing issues at this time.  P. Continue with Mepilex lite on BL cheeks and change every 3 days. Continue to follow pressure injury prevention and incontinence protocol. Will follow up weekly.

## 2021-05-04 NOTE — PROGRESS NOTES
Time of notification: 1:11 PM  Provider notified:  Intern Marvel Mcdowell MD 0700 05/05/21 1600  [phone] 1st Call       Patient status: Call daughter at [phone]  to update post care conference questions  -Can therapist be ordered as discussed  - Pt received Moderna Vaccine at [facility] and was due for second vaccine 4/16. Can patient have second dose of moderna inpatient prior to discharge?  Temp:  [97.3  F (36.3  C)-98.7  F (37.1  C)] 97.5  F (36.4  C)  Pulse:  [] 112  Resp:  [14-28] 28  BP: (128-141)/(72-83) 135/80  FiO2 (%):  [35 %-40 %] 35 %  SpO2:  [88 %-100 %] 96 %  Orders received: awaiting orders

## 2021-05-04 NOTE — PROGRESS NOTES
[facility]    Progress Note - [facility] 3 Service        Date of Admission:  4/26/2021    Assessment & Plan   Olga Bailey is a 75 y.o. F w/ GBM s/p resection (Feb 2021), depression/anxiety, HTN, asthma, and GERD, who was transferred to [facility] for consideration of inpt rad/onc treatment while she continues to be treated for complex presentation of acute hypoxic respiratory failure (due to possible PJP pneumonia and/or TRALI), 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    Today:  - Prednisone discontinued  - Dexamethasone 4mg Qday  - Radiation therapy today   - Transition to PO furosemide in AM     Acute hypoxic respiratory failure   Bilateral Pneumonia; presumed PJP s/p ABx treatment  Concern for transfusion-related acute lung injury (TRALI)  Concern for R hemidiaphragm paralysis  Dyspneic/hypoxic in late March for admission at OSH. Initially treated with ceftri/doxy. ID switched to course of Zosyn/doxy with Bactrim treatment dosing for suspected PJP (had been on steroids after admission for glioblastoma resection; Fungitell positive but Galactomannan negative). Unable to get adequate sputum samples for diagnosis of PJP. Improved with the Bactrim until several days after RP bleed (see below) and was c/f TRALI as well. W/u for PE, COVID, and respiratory viral panel was negative. Bilateral lung opacities present. Concern for ongoing PJP infection (Bactrim was re-started), inflammatory lung condition (increased steroids), volume overload (diuresed for several days with IV Lasix). Imaging shows R hemidiaphragm elevation (concerning for paralysis?), and opacities appear to be improving as are oxygen requirements. Holding off on bronch per pulm recs and pt/family preference given likely need for intubation. Has had several RRTs for tachypnea and tachycardia, appear primarily related to anxiety. Improve with PRN anxiety medication and Bipap for  comfort. Full work-up largely unremarkable. No improvement in O2 needs after diuresis here at [facility].  - Pulmonology consulted, following peripherally  - Working on anxiety as below   - Furosemide PO 40mg QAM beginning 5/5 - goal net 0 to -500 ml daily  - pulm, ID previously consulted   - wean steroids: from methylpred IV 62.5 mg BID -> prednisone 60 mg x 2 d (starting 4/28) -> pred 40 mg x 2 d -> pred 30 mg x 1 d -> pred 20 mg x 1 d - discontinued on 5/4   - Will monitor for signs of adrenal insufficiency     - both agree on holding off on bronch (this is also family/pt's preference)  - PJP ppx: Bactrim single strength qday x 1.5 mo after steroids end (completed 3 week treatment course at OSH)  - PJP stain, PCR; conventional sputum culture ordered if patient can produce sputum   - f/u blood cultures - NGTD  - wean O2 as able to keep sats > 92%  - Bipap when sleeping and PRN for comfort  - SLP consulted - DD1 and nectar thick liquids     Glioblastoma Multiforme  Left frontoparietal brain glioblastoma status post resection 2/23/2021. Seen by radiation oncology 3/24 recommended XRT and chemo radiation. However subsequently admitted for resp failure. MRI brain here signs of possible disease recurrence. Hem/onc concerned that she might not be a candidate for chemotherapy or radiation currently due to poor performance status.  -Rad/onc following; appreciate recs   -Patient being presented at CNS tumor conference 5/3   -Radiation course inpatient 5/4  -Heme/onc consulted, appreciate recs   - Patient not strong enough for chemo   - would consider temozolomide at late date pending clinical course (needs to improve performance status)  - Anticipate care conference 5/3/21    Major depressive disorder, recurrent  Anxiety  Follows with psychiatry and psychology as outpatint.  Saw health psychology during admission for GBM, made plan for outpatient follow-up and med adjustment,  but was unable.  Given prolonged admission patient  could benefit from inpatient consultation again and patient requesting this.  - Psychiatry consulted, appreciate recs  - Palliative recs appreciated;  - Seroquel 25 mg BID + 50 mg at bedtime  - Ativan 0.5 mg q4h PRN - IV per pt request  - Scheduled doxepin PRN at night for sleep  - c/t PTA Buspar  - PTA prozac (dose reduced from 80 to 60 mg)  - Scheduled oxycodone TID + PRN for dyspnea  - Health psychology consult; appreciate recs     Bilateral lower extremity DVT  Right retroperitoneal hematoma   On 3/29 at outside hospital patient's O2 needs increased, duplex ultrasound revealed bilateral lower extremity DVT. CT PE negative for embolism.  Treated with IV heparin and transitioned to Lovenox 70 mg.  On 4/14 this was complicated by retroperitoneal bleed, requiring 4 units of packed red blood cells.  Lovenox was held, and IVC filter was placed on 4/16. Vascular surgery was involved, and a CT abdomen was stable bleed so no surgical intervention was required.  Eventually resumed on prophylactic 40mg of Lovenox twice daily.  -Continue 40 mg Lovenox qday  -hem/onc consulted; appreciate recs   - pain plan:              - Tylenol 975 mg q8h brianna              - oxycodone 2.5-5 mg q4h PRN for pain              - discontinued Dilaudid to avoid delirium  - Asymmetric foot swelling , 4/30/2021. Will not obtain US given there are no changes we would make to the anticoagulation plan; discussed with heme on 4/29/21    Ileus  Began after bleed at outside hospital 4/14; was on TPN for nutrition.  GI was consulted for assistance with bowel management.  With escalating bowel regimen finally had small BM 4/25 after tap water enema, had some liquid stool 4/27. AXR shows non-obstructive bowel gas pattern.  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 Urine sodium 50 while her sodium was 128, certainly consistent with this. Neurologic etiology with her GBM resection likely. Now trending up.   - trend Na; consider fluid restriction if any concern for worsening Na     Shock Liver, improving  LFTs sharply up after RP bleed at OSH, [place] due to shock liver. LFT's have steadily trended down since. Only AST remains elevated and trending down.   -Recheck CMP in am    Steroid-induced hyperglycemia   Intermittently requiring insulin  -Low sliding scale insulin  -Gluc checks     Leukocytosis  Ddx is infection vs steroid-induced.  - trend CBC     Hypertension  Sinus tachycardia  Started on metoprolol at OSH for sinus tachycardia.  - c/t PTA amlodipine 10 mg qday  - hold BB    Chronic medical problems:  - albuterol PRN  - BIPAP at bedtime (on CPAP at home) and for comfort  - GERD: back to PTA PO PPI     Diet: Dysphagia Diet Level 1 Pureed Nectar Thickened Liquids (pre-thickened or use instant food thickener)  parenteral nutrition - ADULT compounded formula CYCLE  Snacks/Supplements Adult: Magic Cup; Between Meals  Calorie Counts    Fluids: TPN  Lines: PICC line  DVT Prophylaxis: Enoxaparin (Lovenox) SQ  Martino Catheter: not present  Code Status: Full Code         Disposition Plan   Expected discharge: > 7 days, recommended to transitional care unit once respiratory status improves, plan for rehab and chemo/radiation in place .  Entered: Marvel Mcdowell MD 05/04/2021, 7:37 AM     The patient's care was discussed with the Attending Physician, Dr. Gordon.    Marvel Mcdowell MD  PGY-1, Internal Medicine  [facility]  ______________________________________________________________________    Interval History    Was on Bipap overnight but tore off mask in AM requesting break, placed 4L NC. Feeling well this morning. No reports of any changes from her on her neuro status.  Urinating and having BMs without issue.    4 Point ROS obtained and negative except as noted above.     Data reviewed today: I reviewed all medications, new labs and imaging results over the last 24 hours. I personally reviewed     Physical Exam   Vital Signs: Temp: 98.7  F (37.1  C) Temp src: Axillary BP: 139/81 Pulse: 116   Resp: 21 SpO2: 96 % O2 Device: Nasal cannula Oxygen Delivery: 4 LPM  Weight: 163 lbs 2.25 oz   General Appearance: lying down in bed, awake.  Eyes: no scleral icterus, EOMI  HEENT: neck supple, normocephalic, NC in place. Dry appearing mucous membranes   Respiratory: No accessory muscle use, mildly course breath sounds in bilateral lung fields anteriorly   Cardiovascular: Tachycardia, regular rhythm, normal S1/S2, no murmurs appreciated, intact distal pulses  GI: soft, non-tender, non-distended  Skin: scattered bruises over upper exposed upper extremities, no rash noted. Pedal edema on left.  Musculoskeletal: pedal edema in the left foot; no edema of right foot.  Neurologic: Moves all extremities spontaneously, RUE mildly weaker (3/5) compared to LUE, follows commands.   Psychiatric: Anxious appearing     Data   Recent Labs   Lab 05/04/21  0532 05/03/21  0502 05/02/21  0716 04/28/21  0831 04/28/21  0831 04/28/21  0535   WBC 8.7 9.3 10.4   < >  --  13.7*   HGB 8.4* 8.3* 8.6*   < >  --  9.3*   MCV 98 96 95   < >  --  91    199 200   < >  --  219   INR  --  0.99  --   --   --   --     139 139   < >  --  134   POTASSIUM 3.8 3.6 3.8   < >  --  3.8   CHLORIDE 106 103 103   < >  --  102   CO2 32 31 30   < >  --  26   BUN 35* 35* 32*   < >  --  16   CR 0.32* 0.37* 0.37*   < >  --  0.40*   ANIONGAP 3 5 6   < >  --  6   ESTIVEN 8.8 8.8 8.5   < >  --  8.7   * 123* 128*   < >  --  155*   ALBUMIN 2.4* 2.4* 2.2*   < >  --  2.5*   PROTTOTAL 5.1* 5.2* 5.2*   < >  --  5.4*   BILITOTAL 0.4 0.3 0.4   < >  --  0.3   ALKPHOS 110 111 116   < >  --  116   ALT 40 44 50   < >  --  82*   AST 26 25 32   <  >  --  35   TROPI  --   --  0.035  --  0.044 0.041    < > = values in this interval not displayed.     No results found for this or any previous visit (from the past 24 hour(s)).

## 2021-05-04 NOTE — PLAN OF CARE
Neuro: A&Ox3, lethargic, arouses to voice.Intermittent anxiety PRN ativan and Seroquel administered.  Cardiac: SR-ST. VSS.             Respiratory:  4L NC. Bipap at night  GI/: Adequate urine output, purewick in place.  Diet/appetite: Tolerating DD1  and nectar thick liquids  Activity:  Assist of lift. Dangled at EOB and stood 3-4x with PT.  Pain: At acceptable level on current regimen.   Skin: No new deficits noted. Repositioned frequently  LDA's: PICC with TPN/lipids.    Palliative Radiation scheduled for 1545 today.     Plan: Continue with POC. Notify primary team with changes.

## 2021-05-04 NOTE — PLAN OF CARE
NEURO: A&O x4 but waxes and wanes at times.   VITALS: Blood pressure 128/72, pulse 105, temperature 98.7  F (37.1  C), temperature source Axillary, resp. rate 22, weight 75 kg (165 lb 5.5 oz), SpO2 97 %.  PAIN: Denies  CARDIAC: ST  RESPIRATORY: On bipap at 40% fio2  GI: Bowel sounds active, BM  x2 adequate PO intake  : Adequate UOP  LDA: PICC, purewick  IV: TPN/lipids  ACTIVITY: Not OOB this shift, repo with Ax2  GOALS: Rest on bipap tonight

## 2021-05-05 ENCOUNTER — APPOINTMENT (OUTPATIENT)
Dept: SPEECH THERAPY | Facility: CLINIC | Age: 76
DRG: 177 | End: 2021-05-05
Attending: STUDENT IN AN ORGANIZED HEALTH CARE EDUCATION/TRAINING PROGRAM
Payer: MEDICARE

## 2021-05-05 ENCOUNTER — ALLIED HEALTH/NURSE VISIT (OUTPATIENT)
Dept: RADIATION ONCOLOGY | Facility: CLINIC | Age: 76
End: 2021-05-05
Attending: STUDENT IN AN ORGANIZED HEALTH CARE EDUCATION/TRAINING PROGRAM
Payer: MEDICARE

## 2021-05-05 ENCOUNTER — APPOINTMENT (OUTPATIENT)
Dept: PHYSICAL THERAPY | Facility: CLINIC | Age: 76
DRG: 177 | End: 2021-05-05
Attending: STUDENT IN AN ORGANIZED HEALTH CARE EDUCATION/TRAINING PROGRAM
Payer: MEDICARE

## 2021-05-05 LAB
ALBUMIN SERPL-MCNC: 2.4 G/DL (ref 3.4–5)
ALP SERPL-CCNC: 106 U/L (ref 40–150)
ALT SERPL W P-5'-P-CCNC: 40 U/L (ref 0–50)
ANION GAP SERPL CALCULATED.3IONS-SCNC: 3 MMOL/L (ref 3–14)
AST SERPL W P-5'-P-CCNC: 27 U/L (ref 0–45)
BILIRUB SERPL-MCNC: 0.3 MG/DL (ref 0.2–1.3)
BUN SERPL-MCNC: 38 MG/DL (ref 7–30)
CALCIUM SERPL-MCNC: 9 MG/DL (ref 8.5–10.1)
CHLORIDE SERPL-SCNC: 107 MMOL/L (ref 94–109)
CO2 SERPL-SCNC: 31 MMOL/L (ref 20–32)
CREAT SERPL-MCNC: 0.28 MG/DL (ref 0.52–1.04)
ERYTHROCYTE [DISTWIDTH] IN BLOOD BY AUTOMATED COUNT: 20.2 % (ref 10–15)
GFR SERPL CREATININE-BSD FRML MDRD: >90 ML/MIN/{1.73_M2}
GLUCOSE BLDC GLUCOMTR-MCNC: 128 MG/DL (ref 70–99)
GLUCOSE BLDC GLUCOMTR-MCNC: 132 MG/DL (ref 70–99)
GLUCOSE BLDC GLUCOMTR-MCNC: 141 MG/DL (ref 70–99)
GLUCOSE BLDC GLUCOMTR-MCNC: 149 MG/DL (ref 70–99)
GLUCOSE BLDC GLUCOMTR-MCNC: 149 MG/DL (ref 70–99)
GLUCOSE BLDC GLUCOMTR-MCNC: 236 MG/DL (ref 70–99)
GLUCOSE SERPL-MCNC: 157 MG/DL (ref 70–99)
HCT VFR BLD AUTO: 27.5 % (ref 35–47)
HGB BLD-MCNC: 8.2 G/DL (ref 11.7–15.7)
LACTATE BLD-SCNC: 1.6 MMOL/L (ref 0.7–2)
LACTATE BLD-SCNC: 2.3 MMOL/L (ref 0.7–2)
MAGNESIUM SERPL-MCNC: 2.2 MG/DL (ref 1.6–2.3)
MCH RBC QN AUTO: 29.2 PG (ref 26.5–33)
MCHC RBC AUTO-ENTMCNC: 29.8 G/DL (ref 31.5–36.5)
MCV RBC AUTO: 98 FL (ref 78–100)
PHOSPHATE SERPL-MCNC: 3.3 MG/DL (ref 2.5–4.5)
PLATELET # BLD AUTO: 191 10E9/L (ref 150–450)
POTASSIUM SERPL-SCNC: 3.7 MMOL/L (ref 3.4–5.3)
PROT SERPL-MCNC: 5.1 G/DL (ref 6.8–8.8)
RBC # BLD AUTO: 2.81 10E12/L (ref 3.8–5.2)
SODIUM SERPL-SCNC: 141 MMOL/L (ref 133–144)
WBC # BLD AUTO: 9.1 10E9/L (ref 4–11)

## 2021-05-05 PROCEDURE — 250N000013 HC RX MED GY IP 250 OP 250 PS 637: Performed by: STUDENT IN AN ORGANIZED HEALTH CARE EDUCATION/TRAINING PROGRAM

## 2021-05-05 PROCEDURE — 250N000011 HC RX IP 250 OP 636: Performed by: STUDENT IN AN ORGANIZED HEALTH CARE EDUCATION/TRAINING PROGRAM

## 2021-05-05 PROCEDURE — 250N000013 HC RX MED GY IP 250 OP 250 PS 637: Performed by: INTERNAL MEDICINE

## 2021-05-05 PROCEDURE — 92526 ORAL FUNCTION THERAPY: CPT | Mod: GN

## 2021-05-05 PROCEDURE — 99233 SBSQ HOSP IP/OBS HIGH 50: CPT | Performed by: INTERNAL MEDICINE

## 2021-05-05 PROCEDURE — 36592 COLLECT BLOOD FROM PICC: CPT | Performed by: INTERNAL MEDICINE

## 2021-05-05 PROCEDURE — 83605 ASSAY OF LACTIC ACID: CPT | Performed by: INTERNAL MEDICINE

## 2021-05-05 PROCEDURE — 120N000003 HC R&B IMCU UMMC

## 2021-05-05 PROCEDURE — 250N000012 HC RX MED GY IP 250 OP 636 PS 637: Performed by: STUDENT IN AN ORGANIZED HEALTH CARE EDUCATION/TRAINING PROGRAM

## 2021-05-05 PROCEDURE — 84100 ASSAY OF PHOSPHORUS: CPT | Performed by: STUDENT IN AN ORGANIZED HEALTH CARE EDUCATION/TRAINING PROGRAM

## 2021-05-05 PROCEDURE — 99207 PR CDG-CUT & PASTE-POTENTIAL IMPACT ON LEVEL: CPT | Performed by: INTERNAL MEDICINE

## 2021-05-05 PROCEDURE — 36592 COLLECT BLOOD FROM PICC: CPT | Performed by: PHYSICIAN ASSISTANT

## 2021-05-05 PROCEDURE — 99233 SBSQ HOSP IP/OBS HIGH 50: CPT | Mod: GC | Performed by: INTERNAL MEDICINE

## 2021-05-05 PROCEDURE — 77014 PR CT GUIDE FOR PLACEMENT RADIATION THERAPY FIELDS: CPT | Mod: 26 | Performed by: STUDENT IN AN ORGANIZED HEALTH CARE EDUCATION/TRAINING PROGRAM

## 2021-05-05 PROCEDURE — 82962 GLUCOSE BLOOD TEST: CPT

## 2021-05-05 PROCEDURE — 80053 COMPREHEN METABOLIC PANEL: CPT | Performed by: STUDENT IN AN ORGANIZED HEALTH CARE EDUCATION/TRAINING PROGRAM

## 2021-05-05 PROCEDURE — 77386 HC IMRT TREATMENT DELIVERY, COMPLEX: CPT | Performed by: STUDENT IN AN ORGANIZED HEALTH CARE EDUCATION/TRAINING PROGRAM

## 2021-05-05 PROCEDURE — 90791 PSYCH DIAGNOSTIC EVALUATION: CPT | Performed by: PSYCHOLOGIST

## 2021-05-05 PROCEDURE — 97530 THERAPEUTIC ACTIVITIES: CPT | Mod: GP

## 2021-05-05 PROCEDURE — 83735 ASSAY OF MAGNESIUM: CPT | Performed by: STUDENT IN AN ORGANIZED HEALTH CARE EDUCATION/TRAINING PROGRAM

## 2021-05-05 PROCEDURE — 85027 COMPLETE CBC AUTOMATED: CPT | Performed by: STUDENT IN AN ORGANIZED HEALTH CARE EDUCATION/TRAINING PROGRAM

## 2021-05-05 PROCEDURE — 83605 ASSAY OF LACTIC ACID: CPT | Performed by: PHYSICIAN ASSISTANT

## 2021-05-05 PROCEDURE — 258N000003 HC RX IP 258 OP 636: Performed by: STUDENT IN AN ORGANIZED HEALTH CARE EDUCATION/TRAINING PROGRAM

## 2021-05-05 PROCEDURE — 36415 COLL VENOUS BLD VENIPUNCTURE: CPT | Performed by: STUDENT IN AN ORGANIZED HEALTH CARE EDUCATION/TRAINING PROGRAM

## 2021-05-05 PROCEDURE — 250N000009 HC RX 250: Performed by: INTERNAL MEDICINE

## 2021-05-05 RX ADMIN — LEVETIRACETAM 250 MG: 100 INJECTION, SOLUTION, CONCENTRATE INTRAVENOUS at 09:06

## 2021-05-05 RX ADMIN — QUETIAPINE 50 MG: 50 TABLET ORAL at 02:05

## 2021-05-05 RX ADMIN — ACYCLOVIR: 50 OINTMENT TOPICAL at 01:57

## 2021-05-05 RX ADMIN — LACTULOSE 3 G: 20 SOLUTION ORAL at 09:07

## 2021-05-05 RX ADMIN — INSULIN ASPART 1 UNITS: 100 INJECTION, SOLUTION INTRAVENOUS; SUBCUTANEOUS at 17:18

## 2021-05-05 RX ADMIN — ACYCLOVIR: 50 OINTMENT TOPICAL at 09:08

## 2021-05-05 RX ADMIN — Medication 2.5 MG: at 09:07

## 2021-05-05 RX ADMIN — FUROSEMIDE 40 MG: 40 TABLET ORAL at 09:08

## 2021-05-05 RX ADMIN — PANTOPRAZOLE SODIUM 40 MG: 40 TABLET, DELAYED RELEASE ORAL at 17:06

## 2021-05-05 RX ADMIN — ACETAMINOPHEN 975 MG: 325 TABLET, FILM COATED ORAL at 14:55

## 2021-05-05 RX ADMIN — QUETIAPINE 25 MG: 25 TABLET ORAL at 10:05

## 2021-05-05 RX ADMIN — QUETIAPINE 50 MG: 50 TABLET ORAL at 21:49

## 2021-05-05 RX ADMIN — ENOXAPARIN SODIUM 40 MG: 100 INJECTION SUBCUTANEOUS at 17:06

## 2021-05-05 RX ADMIN — INSULIN ASPART 1 UNITS: 100 INJECTION, SOLUTION INTRAVENOUS; SUBCUTANEOUS at 13:14

## 2021-05-05 RX ADMIN — Medication 2.5 MG: at 14:55

## 2021-05-05 RX ADMIN — QUETIAPINE FUMARATE 25 MG: 25 TABLET ORAL at 07:20

## 2021-05-05 RX ADMIN — QUETIAPINE 25 MG: 25 TABLET ORAL at 17:06

## 2021-05-05 RX ADMIN — AMLODIPINE BESYLATE 10 MG: 10 TABLET ORAL at 09:07

## 2021-05-05 RX ADMIN — ACETAMINOPHEN 975 MG: 325 TABLET, FILM COATED ORAL at 20:34

## 2021-05-05 RX ADMIN — FLUOXETINE HYDROCHLORIDE 60 MG: 40 CAPSULE ORAL at 09:08

## 2021-05-05 RX ADMIN — INSULIN ASPART 1 UNITS: 100 INJECTION, SOLUTION INTRAVENOUS; SUBCUTANEOUS at 10:08

## 2021-05-05 RX ADMIN — SULFAMETHOXAZOLE AND TRIMETHOPRIM 1 TABLET: 400; 80 TABLET ORAL at 09:08

## 2021-05-05 RX ADMIN — LINACLOTIDE 290 MCG: 145 CAPSULE, GELATIN COATED ORAL at 09:06

## 2021-05-05 RX ADMIN — Medication: at 20:16

## 2021-05-05 RX ADMIN — MESALAMINE 1000 MG: 1000 SUPPOSITORY RECTAL at 21:49

## 2021-05-05 RX ADMIN — BUSPIRONE HYDROCHLORIDE 30 MG: 15 TABLET ORAL at 20:33

## 2021-05-05 RX ADMIN — I.V. FAT EMULSION 250 ML: 20 EMULSION INTRAVENOUS at 20:16

## 2021-05-05 RX ADMIN — DEXAMETHASONE 4 MG: 4 TABLET ORAL at 09:07

## 2021-05-05 RX ADMIN — PANTOPRAZOLE SODIUM 40 MG: 40 TABLET, DELAYED RELEASE ORAL at 09:07

## 2021-05-05 RX ADMIN — LEVETIRACETAM 250 MG: 100 INJECTION, SOLUTION, CONCENTRATE INTRAVENOUS at 21:50

## 2021-05-05 RX ADMIN — ACYCLOVIR: 50 OINTMENT TOPICAL at 20:17

## 2021-05-05 RX ADMIN — DOXEPIN HYDROCHLORIDE 3 MG: 10 SOLUTION ORAL at 21:49

## 2021-05-05 RX ADMIN — Medication 2.5 MG: at 20:34

## 2021-05-05 RX ADMIN — POLYETHYLENE GLYCOL 3350 17 G: 17 POWDER, FOR SOLUTION ORAL at 09:06

## 2021-05-05 RX ADMIN — ACETAMINOPHEN 975 MG: 325 TABLET, FILM COATED ORAL at 09:07

## 2021-05-05 RX ADMIN — LACTULOSE 3 G: 20 SOLUTION ORAL at 17:06

## 2021-05-05 RX ADMIN — ACYCLOVIR: 50 OINTMENT TOPICAL at 17:06

## 2021-05-05 RX ADMIN — BUSPIRONE HYDROCHLORIDE 30 MG: 15 TABLET ORAL at 09:07

## 2021-05-05 RX ADMIN — LORAZEPAM 0.5 MG: 2 INJECTION INTRAMUSCULAR; INTRAVENOUS at 07:27

## 2021-05-05 RX ADMIN — LORAZEPAM 0.5 MG: 2 INJECTION INTRAMUSCULAR; INTRAVENOUS at 14:24

## 2021-05-05 ASSESSMENT — ACTIVITIES OF DAILY LIVING (ADL)
ADLS_ACUITY_SCORE: 23
ADLS_ACUITY_SCORE: 19

## 2021-05-05 NOTE — PROGRESS NOTES
CLINICAL NUTRITION SERVICES - BRIEF NOTE      Nutrition Prescription     RECOMMENDATIONS FOR MDs/PROVIDERS TO ORDER:  Diet texture advancement per SLP      Recommendations already ordered by Registered Dietitian (RD):  Cycle over 16 hours: Dextrose 175 g, AA 75 g, lipid 5x per week = 1252 kcal (22 kcal/kg) 1.3 g/kg and 28 % kcal from fat (GIR @ max rate 3.67)   -Continue magic cup daily   - Ordered Ensure Enlive daily    Future/Additional Recommendations:  Monitor results of calorie count/tolernace of oral intake and ability to further cycle/discontinue TPN     *Please see full assessment note from 5/4/21    New Findings:  Calorie count 5/4: Total Kcals: 830       Total Protein: 25g    Interventions  Collaboration with other providers  Parenteral Nutrition/IV Fluids - Modify concentration    RD to follow per protocol.    Chaya Fontana RD, LD  6B pager: [phone]

## 2021-05-05 NOTE — PLAN OF CARE
/85 (BP Location: Left arm)   Pulse 109   Temp 97.6  F (36.4  C) (Oral)   Resp 20   Wt 75.1 kg (165 lb 9.1 oz)   SpO2 97%   BMI 29.33 kg/m      Time: 2592-7815.  Reason for admission: Glioblastoma, ileus, radiation.     VS: VSS on 2L NC with O2 sats in mid 90s. Afebrile.   Activity: Up with assist x2 with gait belt to pivot to chair. Repo q2hrs. Calls appropriately. Worked with PT, stood at bedside. Up in chair x2.   Neuros: A&Ox3 - disorientated to time (year). Neuros intact except generalized weakness, LE edema +2. Denies pain. C/o anxiety managed with PRN PO Seroquel and PRN IV Ativan given x1.   Cardiac: SR. Slight HTN with -140s/80s. Intermittent tachy with HR in 90-110s. Denies chest pain.   Respiratory: LS clear but diminished. Denies SOB at rest, c/o JACOBSEN. Stable on 2L NC. Infrequent dry cough.   GI/: Incontinent of bowel and bladder, purewick in place with moderate urine output. x2 BMs on this shift. +BS, +gas. Denies nausea or vomiting.   Diet: DD1 diet with nectar thick liquids, tolerating well. Calorie counts. Worked with SLP today, no changes to diet orders at this time. Cycled TPN.   Skin: Bilateral face wounds, scabbing over, wound care completed, dressing changed & CDI.   Lines: Right triple lumen PICC: SL'd. TPN cycled for 18 hours.   Labs: Electrolytes stable, no replacements needed. , 236, & 149.     New changes this shift/Plan: VSS on 2L NC, afebrile. Up with assist x2 with gait belt to pivot, repo q2hrs, worked with PT, up in chair x2. Neuros unchanged, denies pain. Anxiety managed with scheduled Seroquel as well as PRN PO Seroquel and PRN IV Ativan, psych consult completed. Slight HTN, LS dim. x2 BMs, purewick in place. Tolerating diet, seen by SLP, denies nausea. PICC SL'd. TPN cycled. Labs stable, BG stable. Radiation completed this morning, pt will have radiation every day (today was 2 out of 15). Pt's daughter at bedside this afternoon. Triggered sepsis this  evening, LA 2.3, MD aware, VS q2hrs ordered, recheck LA scheduled for 2200.  Will continue to monitor & follow POC.

## 2021-05-05 NOTE — PROGRESS NOTES
05/05/21 1600   Call Information   Date of Call 05/05/21   Time of Call 1603   Name of person requesting the team Alla KLINE   Title of person requesting team RN   RRT Arrival time 1606   Time RRT ended 1610   Reason for call   Type of RRT Adult   Primary reason for call Sepsis suspected   Sepsis Suspected Elevated Lactate level;Heart Rate > 100   Was patient transferred from the ED, ICU, or PACU within last 24 hours prior to RRT call? No   SBAR   Situation LA 2.3   Background pt with new dx of glioblastoma. came in with acute respiratory failure(PJP), RP bleed, B/B LE DVT's.    Notable History/Conditions Cancer;Hypertension   Assessment pt is awake alert and oriented x3, has trouble with the year. 's. OVSS   Interventions No interventions   Patient Outcome   Patient Outcome Stabilized on unit   RRT Team   Attending/Primary/Covering Physician Maximilian 3   Date Attending Physician notified 05/05/21   Time Attending Physician notified 1603   Physician(s) Rebecca KLINE   RT Abdisa   Post RRT Intervention Assessment   Post RRT Assessment Stable/Improved   Date Follow Up Done 05/05/21   Time Follow Up Done 1845   Comments recheck LA at 2215        05/05/21 1600   Call Information   Date of Call 05/05/21   Time of Call 1603   Name of person requesting the team Alla KLINE   Title of person requesting team RN   RRT Arrival time 1606   Time RRT ended 1610   Reason for call   Type of RRT Adult   Primary reason for call Sepsis suspected   Sepsis Suspected Elevated Lactate level;Heart Rate > 100   Was patient transferred from the ED, ICU, or PACU within last 24 hours prior to RRT call? No   SBAR   Situation LA 2.3   Background pt with new dx of glioblastoma. came in with acute respiratory failure(PJP), RP bleed, B/B LE DVT's.    Notable History/Conditions Cancer;Hypertension   Assessment pt is awake alert and oriented x3, has trouble with the year. 's. OVSS   Interventions No  interventions   Patient Outcome   Patient Outcome Stabilized on unit   RRT Team   Attending/Primary/Covering Physician Maximilian 3   Date Attending Physician notified 05/05/21   Time Attending Physician notified 1603   Physician(s) Rebecca Osman   Post RRT Intervention Assessment   Post RRT Assessment Stable/Improved   Date Follow Up Done 05/05/21   Time Follow Up Done 1845   Comments recheck LA at 2572

## 2021-05-05 NOTE — PROGRESS NOTES
Calorie Count  Intake recorded for: 5/4  Total Kcals: 830 Total Protein: 25g  Kcals from Hospital Food: 830   Protein: 25g  Kcals from Outside Food (average):0  Protein: 0g  # Meals Ordered from Kitchen: 2  # Meals Recorded: 1 (First- 1 magic cup, chocolate pudding, pureed pizza, pureed green beans)  # Supplements Recorded: 100% magic cup
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
## 2021-05-05 NOTE — PROGRESS NOTES
Hematology / Oncology  Daily Progress Note   Date of Service: 05/05/2021  Patient: Olga Bailey  MRN: [id number]  Admission Date: 4/26/2021  Hospital Day # 9  Cancer Diagnosis: Glioblastoma  Primary Outpatient Oncologist: Dr. Baker  Current Treatment Plan: S/p tumor resection 2/23/21. Plan is to undergo radiation; tentative start date 5/4.     Assessment & Plan:   Olga Bailey is a 75 year old year old female with history of left frontoparietal glioblastoma s/p tumor resection 2/23/21. The plan was to undergo adjuvant chemoradiation, which has been held for prolonged admission at [facility] (3/26-4/26/21) with pneumonia and multiple DVTs, complicated by retroperitoneal hemorrhage and shock s/p IVC filter placement. She was transferred to [facility] on 4/26/21 for further Oncology and Radiation Oncology care.      Recommendations:   - Radiation fraction 2/15 completed today. Continue Dexamethasone 4 mg daily for course of treatment.  - Requested follow up with Dr. Baker after completion of radiation (week of 5/24).  - Continue PT/OT; recommendation for TCU on discharge.   - Continue Seroquel for anxiety. Appreciate psychiatry, health psychology input.     # Glioblastoma Multiforme  Follows with Dr. Baker. For further detail of oncologic history, please see below. In summary, patient was diagnosed with a left frontoparietal brain glioblastoma s/p resection 2/23/2021. Seen by Dr. Baker on 3/23, recommended temozolomide and radiation however treatment has not been initiated yet due to prolonged admission at [facility] with pneumonia, B/l LE DVTs, and RP hemorrhage. Ultimately transferred to [facility] where she could receive radiation treatment as appropriate.    - Regarding steroids, taper previously per pulm/ID of prednisone 20 mg today, followed by 10 mg tomorrow (5/4). In conjunction with radiation, please transition to Dexamethasone 4 mg daily starting 5/4.   - Care conference held 5/3 with primary  team, palliative care, heme/onc team, SW and patient's family. Updated on current status/hospitalization and proposed treatment plan of 15 fractions over 15 weekdays (starting 5/4). Oral chemotherapy (Temodar) could with considered with improvement in performance status, however would not be feasible at this juncture. Patient will be able to start radiation while inpatient and could continue if she were to transition to  TCU. Questions answered at bedside. Will arrange for visit with Dr. Baker to discuss Temodar after completion of radiation..     # Bilateral LE DVTs  # Retroperitoneal bleed  B/l LE DVTs noted on 3/29/21.   - Started therapeutic anticoagulation. C/b right retroperitoneal bleed 4/14/21 and subsequently developed acute hypoxic respiratory failure.   - IVC filter placed with IR on 4/16/21.   - Bilateral LE ultrasound showed improvement of previously diagnosed DVTs, though not resolution   - keep IVC filter   - anticoagulation per primary team, but ok to continue ppx lovenox    # SOB  # Dyspnea  # Recent PJP pneumonia  # Anxiety  Olga continues to have SOB and tachypnea. Breathing frequently. She continues to struggle with anxiety at night and starts to breath very quickly which tires her out and requires her to be on BiPap. Slept well after she had difficulty with anxiety.    - ECHO showed hyperkinetic with EF >70%  - CXR has stable mixed opacities bilaterally  - With medical workup fairly unremarkable, concern remains an element of anxiety causing her to become tachypneic. Psychiatry consulted (4/29) with recommendation to initiate Seroquel 12.5 - 25 mg AM and 1800 scheduled; hold if somnolent. Can always increase or trial 25 - 50 mg at bedtime as well. Recommend health psychology consultation as well.      We will continue to follow this patient. Please do not hesitate to page with any questions or concerns.     Patient was seen and plan of care was discussed with attending physician   Sadiq.     Radha Richardson PA-C  Hematology/Oncology   Pager: [phone]    Oncologic History:  - 2/2021 PRESENTATION: Progressive aphasia.   - 2/19/2021 MR brain imaging with 3.3 x 2.8 x 2.8 cm enhancing mass in left frontal-parietal region. A second contrast enhancing lesion (0.5 x 1.0 x 1.0 cm) in right occipital lobe which is dural based and largely stable in size since 9/2011; likely representing a meningioma.  - 2/23/2021 SURGERY: Gross total resection by Dr. Cummings  PATHOLOGY: Glioblastoma; IDH1-R132H wild-type/ IDH 1 and 2 wildtype, MGMT promoter methylated. Not BRAF mutated.   - 3/23/2021 Established care with Dr. Baker. Plan was to initiate chemotherapy with temozolomide 75mg/m2 (140mg) daily in the evening until completion of radiation. Temozolomide held for admission to [facility]. Adjuvant radiation was planned at [facility], held for dyspnea and subsequently admitted to [facility] with acute hypoxic respiratory failure and pneumonia.   __________________________________________________________________    Subjective & Interval History:    Patient significantly brighter today. Seen sitting up in bed. Overnight no acute events and feeling well this morning. Stood up with PT yesterday which was encouraging, however also made her realize how much progress she has to make. Encouraged her to continue working hard with therapies and that it will likely take an extended amount of time to recover due to prolonged hospitalizations. Ramins stable on 3L NC and feels she is breathing well. Tolerating radiation well without claustrophobia. Awaiting her oatmeal to come for breakfast. No new or worsening symptoms. Complete and Comprehensive review of systems review and negative other than noted here or in the HPI.     Physical Exam:    Blood pressure (!) 140/89, pulse 116, temperature 97.3  F (36.3  C), temperature source Oral, resp. rate 20, weight 75.1 kg (165 lb 9.1 oz), SpO2 93 %.    Constitutional: Pleasant female  seen sitting up in bed. Pleasant, conversational. Non- toxic appearing. No acute distress.   HEENT: Normocephalic, atraumatic. Sclerae anicteric. EOM intact.   Respiratory: Breathing comfortably, NC in place.   Cardiovascular: Appears well perfused  Skin: Skin is clean, dry, intact. No jaundice appreciated.   Musculoskeletal/ Extremities: No redness, warmth, or swelling of the joints appreciated.   Neurologic: Alert and oriented. Speech normal. Grossly nonfocal. Memory and thought process preserved.   Neuropsychiatric: Calm, affect congruent to situation. Appropriate mood and affect. Good judgment and insight. No visual/auditory hallucinations.       Labs & Studies: I personally reviewed the following studies:  ROUTINE LABS (Last four results):  CMP  Recent Labs   Lab 05/05/21  0505 05/04/21  0532 05/03/21  0502 05/02/21  0716    141 139 139   POTASSIUM 3.7 3.8 3.6 3.8   CHLORIDE 107 106 103 103   CO2 31 32 31 30   ANIONGAP 3 3 5 6   * 152* 123* 128*   BUN 38* 35* 35* 32*   CR 0.28* 0.32* 0.37* 0.37*   GFRESTIMATED >90 >90 >90 >90   GFRESTBLACK >90 >90 >90 >90   ESTIVEN 9.0 8.8 8.8 8.5   MAG 2.2 2.1 2.2 2.2   PHOS 3.3 3.0 3.2 2.6   PROTTOTAL 5.1* 5.1* 5.2* 5.2*   ALBUMIN 2.4* 2.4* 2.4* 2.2*   BILITOTAL 0.3 0.4 0.3 0.4   ALKPHOS 106 110 111 116   AST 27 26 25 32   ALT 40 40 44 50     CBC  Recent Labs   Lab 05/05/21  0505 05/04/21  0532 05/03/21  0502 05/02/21  0716   WBC 9.1 8.7 9.3 10.4   RBC 2.81* 2.87* 2.91* 2.97*   HGB 8.2* 8.4* 8.3* 8.6*   HCT 27.5* 28.2* 28.0* 28.3*   MCV 98 98 96 95   MCH 29.2 29.3 28.5 29.0   MCHC 29.8* 29.8* 29.6* 30.4*   RDW 20.2* 20.4* 20.3* 19.9*    195 199 200     INR  Recent Labs   Lab 05/03/21  0502   INR 0.99       Medications list for reference:  Current Facility-Administered Medications   Medication     acetaminophen (TYLENOL) tablet 975 mg     acyclovir (ZOVIRAX) 5 % ointment     albuterol (PROAIR HFA/PROVENTIL HFA/VENTOLIN HFA) 108 (90 Base) MCG/ACT inhaler 2 puff      amLODIPine (NORVASC) tablet 10 mg     bisacodyl (DULCOLAX) Suppository 10 mg     busPIRone (BUSPAR) tablet 30 mg     carboxymethylcellulose PF (REFRESH PLUS) 0.5 % ophthalmic solution 1 drop     dexamethasone (DECADRON) tablet 4 mg     dextrose 10% infusion     glucose gel 15-30 g    Or     dextrose 50 % injection 25-50 mL    Or     glucagon injection 1 mg     doxepin (SINEquan) 10 MG/ML (HIGH CONC) solution 3 mg     enoxaparin ANTICOAGULANT (LOVENOX) injection 40 mg     FLUoxetine (PROzac) capsule 60 mg     furosemide (LASIX) tablet 40 mg     insulin aspart (NovoLOG) injection (RAPID ACTING)     insulin aspart (NovoLOG) injection (RAPID ACTING)     lactulose (CHRONULAC) solution 3 g     levETIRAcetam (KEPPRA) 250 mg in sodium chloride 0.9 % 100 mL intermittent infusion     lidocaine (LMX4) cream     lidocaine 1 % 0.1-1 mL     linaclotide (LINZESS) capsule 290 mcg     lipids (INTRALIPID) 20 % infusion 250 mL     LORazepam (ATIVAN) injection 0.5 mg     melatonin tablet 6 mg     mesalamine (CANASA) Suppository 1,000 mg     naloxone (NARCAN) injection 0.2 mg    Or     naloxone (NARCAN) injection 0.4 mg    Or     naloxone (NARCAN) injection 0.2 mg    Or     naloxone (NARCAN) injection 0.4 mg     ondansetron (ZOFRAN) injection 4 mg     oxyCODONE IR (ROXICODONE) half-tab 2.5 mg     oxyCODONE IR (ROXICODONE) half-tab 2.5-5 mg     pantoprazole (PROTONIX) 2 mg/mL suspension 40 mg     parenteral nutrition - ADULT compounded formula CYCLE     polyethylene glycol (MIRALAX) Packet 17 g     prochlorperazine (COMPAZINE) injection 5 mg     QUEtiapine (SEROquel) tablet 25 mg     QUEtiapine (SEROquel) tablet 25 mg     QUEtiapine (SEROquel) tablet 50 mg     sennosides (SENOKOT) tablet 8.6 mg     simethicone (MYLICON) suspension 40 mg     sodium chloride (PF) 0.9% PF flush 10 mL     sodium chloride (PF) 0.9% PF flush 10 mL     sodium chloride (PF) 0.9% PF flush 10 mL     sodium chloride (PF) 0.9% PF flush 10 mL      sulfamethoxazole-trimethoprim (BACTRIM) 400-80 MG per tablet 1 tablet

## 2021-05-05 NOTE — PLAN OF CARE
NEURO: A&O x4, confused occasionally but reorients easily  VITALS: Blood pressure 127/85, pulse 89, temperature 97.6  F (36.4  C), temperature source Oral, resp. rate 22, weight 74 kg (163 lb 2.3 oz), SpO2 100 %.  PAIN: Denies  CARDIAC: SR  RESPIRATORY: 4L NC  GI: Bowel sounds active, BM x1, adequate PO intake  : Adequate UOP  LDA: PICC, purewick  IV: TPN & lipids  ACTIVITY: Not OOB this shift, repo with Ax2 q2h  GOALS: Rest

## 2021-05-05 NOTE — PROGRESS NOTES
Rapid Response Team Note    Assessment   In assessment a rapid response was called on Olga Bailey due to hyperlactatemia w/ LA of 2.3. This presentation is likely due to possible hyperventilation vs pulmonary edema vs steroid use.     Plan   -  Will monitor and repeat LA at 2200  -  The Internal Medicine primary team was able to be reached and they are in agreement with the above plan.  -  Disposition: The patient will remain on the current unit. We will continue to monitor this patient closely.  -  Reassessment and plan follow-up will be performed by the primary team      Rebecca Myers PA-C  [facility] RRT AMCOM Job Code Contact #[phone]    Hospital Course   Brief Summary of events leading to rapid response:   Olga Bailey is a 74 y/o female with PMH of GBM s/p resection (2021), depression/anxiety, HTN, Asthma and GERD who presents in transfer for inpatint radiation oncology while receiving treatment for AHRF thought s/t PJP vs TRALI. Pulmonology and Heme/Onc consulted on admission. RRT called for hyperlactatemia w/ LA of 2.3 in setting of tachycardia and Tachypnea. Patient is currently receiving steroids. She is afebrile and BP stable. She does endorse mild dyspnea. No increase in O2 use. No CP, Cough, HA, abdominal pain, HA, dysuria. We discussed POC as outlined above and patient agreeable.        Admission Diagnosis:   Hypoxia [R09.02]     Physical Exam   Temp: 97.5  F (36.4  C) Temp  Min: 97.1  F (36.2  C)  Max: 97.9  F (36.6  C)  Resp: 22 Resp  Min: 18  Max: 22  SpO2: 93 % SpO2  Min: 92 %  Max: 100 %  Pulse: 116 Pulse  Min: 83  Max: 116    No data recorded  BP: 135/82 Systolic (24hrs), Av , Min:127 , Max:142   Diastolic (24hrs), Av, Min:78, Max:89     I/Os: I/O last 3 completed shifts:  In: .39 [P.O.:780; I.V.:170]  Out: 800 [Urine:800]     Exam:   General: Pleasant female sitting up in bed. Conversant. NAD  Mental Status: Alert and oriented to person and place- recent  BL.  CV: Tachycardia, though no m/r/g  Resp: tachypnea, Diminished bilaterally R>L. No wheeze or rhonchi noted. Breathing mildly labored on 2L O2 via NC  GI: BS+, no rebound or guarding    Significant Results and Procedures   Lactic Acid:   Recent Labs   Lab Test 05/05/21  1558 05/04/21  1202 05/03/21  1242 05/02/21  1015 04/28/21  1405 04/28/21  1405 04/28/21  0831   LACT  --   --   --  1.6  --  2.1* 3.0*   LACTS 2.3* 2.0 1.8  --    < >  --   --     < > = values in this interval not displayed.     CBC:   Recent Labs   Lab Test 05/05/21  0505 05/04/21  0532 05/03/21  0502   WBC 9.1 8.7 9.3   HGB 8.2* 8.4* 8.3*   HCT 27.5* 28.2* 28.0*    195 199        Sepsis Evaluation   The patient is not known to have an infection.  NO EVIDENCE OF SEPSIS at this time.  Vital sign, physical exam, and lab findings are likely due to Hyperventialtion, pulmonsry edema, steroids.

## 2021-05-05 NOTE — PROGRESS NOTES
[facility]    Progress Note - Maroon 3 Service        Date of Admission:  4/26/2021    Assessment & Plan   Olga Bailey is a 75 y.o. F w/ GBM s/p resection (Feb 2021), depression/anxiety, HTN, asthma, and GERD, who was transferred to [facility] for consideration of inpt rad/onc treatment while she continues to be treated for complex presentation of acute hypoxic respiratory failure (due to possible PJP pneumonia and/or TRALI), 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    Today:  - Dexamethasone 4mg Qday  - Radiation therapy today   - PO furosemide in AM     Acute hypoxic respiratory failure   Bilateral Pneumonia; presumed PJP s/p ABx treatment  Concern for transfusion-related acute lung injury (TRALI)  Concern for R hemidiaphragm paralysis  Dyspneic/hypoxic in late March for admission at OSH. Initially treated with ceftri/doxy. ID switched to course of Zosyn/doxy with Bactrim treatment dosing for suspected PJP (had been on steroids after admission for glioblastoma resection; Fungitell positive but Galactomannan negative). Unable to get adequate sputum samples for diagnosis of PJP. Improved with the Bactrim until several days after RP bleed (see below) and was c/f TRALI as well. W/u for PE, COVID, and respiratory viral panel was negative. Bilateral lung opacities present. Concern for ongoing PJP infection (Bactrim was re-started), inflammatory lung condition (increased steroids), volume overload (diuresed for several days with IV Lasix). Imaging shows R hemidiaphragm elevation (concerning for paralysis?), and opacities appear to be improving as are oxygen requirements. Holding off on bronch per pulm recs and pt/family preference given likely need for intubation. Has had several RRTs for tachypnea and tachycardia, appear primarily related to anxiety. Improve with PRN anxiety medication and Bipap for comfort. Full work-up largely  unremarkable. No improvement in O2 needs after diuresis here at [facility].  - Pulmonology consulted, following peripherally  - Working on anxiety as below   - Furosemide PO 40mg QAM beginning 5/5 - goal net 0 to -500 ml daily  - pulm, ID previously consulted   - wean steroids: from methylpred IV 62.5 mg BID -> prednisone 60 mg x 2 d (starting 4/28) -> pred 40 mg x 2 d -> pred 30 mg x 1 d -> pred 20 mg x 1 d - discontinued on 5/4   - Will monitor for signs of adrenal insufficiency     - both agree on holding off on bronch (this is also family/pt's preference)  - PJP ppx: Bactrim single strength qday x 1.5 mo after steroids end (completed 3 week treatment course at OSH)  - PJP stain, PCR; conventional sputum culture ordered if patient can produce sputum   - f/u blood cultures - NGTD  - wean O2 as able to keep sats > 92%  - Bipap when sleeping and PRN for comfort  - SLP consulted - DD1 and nectar thick liquids     Glioblastoma Multiforme  Left frontoparietal brain glioblastoma status post resection 2/23/2021. Seen by radiation oncology 3/24 recommended XRT and chemo radiation. However subsequently admitted for resp failure. MRI brain here signs of possible disease recurrence. Hem/onc concerned that she might not be a candidate for chemotherapy or radiation currently due to poor performance status.  -Rad/onc following; appreciate recs   -Patient being presented at CNS tumor conference 5/3   -Radiation course inpatient 5/4  -Heme/onc consulted, appreciate recs   - Patient not strong enough for chemo   - would consider temozolomide at late date pending clinical course (needs to improve performance status)  - Anticipate care conference 5/3/21    Major depressive disorder, recurrent  Anxiety  Follows with psychiatry and psychology as outpatint.  Saw health psychology during admission for GBM, made plan for outpatient follow-up and med adjustment,  but was unable.  Given prolonged admission patient could benefit from inpatient  consultation again and patient requesting this.  - Psychiatry consulted, appreciate recs  - Palliative recs appreciated;  - Seroquel 25 mg BID + 50 mg at bedtime  - Ativan 0.5 mg q4h PRN - IV per pt request  - Scheduled doxepin PRN at night for sleep  - c/t PTA Buspar  - PTA prozac (dose reduced from 80 to 60 mg)  - Scheduled oxycodone TID + PRN for dyspnea  - Health psychology consult; appreciate recs     Bilateral lower extremity DVT  Right retroperitoneal hematoma   On 3/29 at outside hospital patient's O2 needs increased, duplex ultrasound revealed bilateral lower extremity DVT. CT PE negative for embolism.  Treated with IV heparin and transitioned to Lovenox 70 mg.  On 4/14 this was complicated by retroperitoneal bleed, requiring 4 units of packed red blood cells.  Lovenox was held, and IVC filter was placed on 4/16. Vascular surgery was involved, and a CT abdomen was stable bleed so no surgical intervention was required.  Eventually resumed on prophylactic 40mg of Lovenox twice daily.  -Continue 40 mg Lovenox qday  -hem/onc consulted; appreciate recs   - pain plan:              - Tylenol 975 mg q8h brianna              - oxycodone 2.5-5 mg q4h PRN for pain              - discontinued Dilaudid to avoid delirium  - Asymmetric foot swelling , 4/30/2021. Will not obtain US given there are no changes we would make to the anticoagulation plan; discussed with heme on 4/29/21    Ileus  Began after bleed at outside hospital 4/14; was on TPN for nutrition.  GI was consulted for assistance with bowel management.  With escalating bowel regimen finally had small BM 4/25 after tap water enema, had some liquid stool 4/27. AXR shows non-obstructive bowel gas pattern.  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 Urine sodium 50 while her sodium was 128, certainly consistent with this. Neurologic etiology with her GBM resection likely. Now trending up.   - trend Na; consider fluid restriction if any concern for worsening Na     Shock Liver, improving  LFTs sharply up after RP bleed at OSH, [place] due to shock liver. LFT's have steadily trended down since. Only AST remains elevated and trending down.   -Recheck CMP in am    Steroid-induced hyperglycemia   Intermittently requiring insulin  -Low sliding scale insulin  -Gluc checks     Leukocytosis  Ddx is infection vs steroid-induced.  - trend CBC     Hypertension  Sinus tachycardia  Started on metoprolol at OSH for sinus tachycardia.  - c/t PTA amlodipine 10 mg qday  - hold BB    Chronic medical problems:  - albuterol PRN  - BIPAP at bedtime (on CPAP at home) and for comfort  - GERD: back to PTA PO PPI     Diet: Dysphagia Diet Level 1 Pureed Nectar Thickened Liquids (pre-thickened or use instant food thickener)  Snacks/Supplements Adult: Magic Cup; Between Meals  Calorie Counts  parenteral nutrition - ADULT compounded formula CYCLE    Fluids: TPN  Lines: PICC line  DVT Prophylaxis: Enoxaparin (Lovenox) SQ  Martino Catheter: not present  Code Status: Full Code         Disposition Plan   Expected discharge: > 7 days, recommended to transitional care unit once respiratory status improves, plan for rehab and chemo/radiation in place .  Entered: Marvel Mcdowell MD 05/05/2021, 7:39 AM     The patient's care was discussed with the Attending Physician, Dr. Gordon.    Marvel Mcdowell MD  PGY-1, Internal Medicine  [facility]  ______________________________________________________________________    Interval History    Nursing notes reviewed, no acute events overnight. Did not require Bipap. Feels better today, more strength. When working with PT yesterday she felt incredibly hopeful being able to stand and feel  the floor with her feet. No changes in respiratory status, continues on 3LPM NC.     4 Point ROS obtained and negative except as noted above.     Data reviewed today: I reviewed all medications, new labs and imaging results over the last 24 hours. I personally reviewed     Physical Exam   Vital Signs: Temp: 97.5  F (36.4  C) Temp src: Oral BP: (!) 141/86 Pulse: 90   Resp: 20 SpO2: 96 % O2 Device: Nasal cannula Oxygen Delivery: 3 LPM  Weight: 165 lbs 9.05 oz   General Appearance: lying down in bed, awake.  Eyes: no scleral icterus, EOMI  HEENT: neck supple, normocephalic, NC in place. Dry appearing mucous membranes   Respiratory: No accessory muscle use, mildly course breath sounds in bilateral lung fields anteriorly   Cardiovascular: Tachycardia, regular rhythm, normal S1/S2, no murmurs appreciated, intact distal pulses  GI: soft, non-tender, non-distended  Skin: scattered bruises over upper exposed upper extremities, no rash noted. 1+ pitting edema to bilateral mid-tibia.  Musculoskeletal: pedal edema in the left foot; no edema of right foot.  Neurologic: Moves all extremities spontaneously, RUE mildly weaker (3/5) compared to LUE, follows commands.   Psychiatric: Anxious appearing     Data   Recent Labs   Lab 05/05/21  0505 05/04/21  0532 05/03/21  0502 05/02/21  0716 04/28/21  0831 04/28/21  0831   WBC 9.1 8.7 9.3 10.4   < >  --    HGB 8.2* 8.4* 8.3* 8.6*   < >  --    MCV 98 98 96 95   < >  --     195 199 200   < >  --    INR  --   --  0.99  --   --   --     141 139 139   < >  --    POTASSIUM 3.7 3.8 3.6 3.8   < >  --    CHLORIDE 107 106 103 103   < >  --    CO2 31 32 31 30   < >  --    BUN 38* 35* 35* 32*   < >  --    CR 0.28* 0.32* 0.37* 0.37*   < >  --    ANIONGAP 3 3 5 6   < >  --    ESTIVEN 9.0 8.8 8.8 8.5   < >  --    * 152* 123* 128*   < >  --    ALBUMIN 2.4* 2.4* 2.4* 2.2*   < >  --    PROTTOTAL 5.1* 5.1* 5.2* 5.2*   < >  --    BILITOTAL 0.3 0.4 0.3 0.4   < >  --    ALKPHOS 106 110 111 116    < >  --    ALT 40 40 44 50   < >  --    AST 27 26 25 32   < >  --    TROPI  --   --   --  0.035  --  0.044    < > = values in this interval not displayed.     No results found for this or any previous visit (from the past 24 hour(s)).

## 2021-05-05 NOTE — PROVIDER NOTIFICATION
"  Pt triggered sepsis d/t HR and RR. LA resulted at 2.3. RRT called at 1603. Primary [facility] 3 team intern Jeremias paged at 1605.    Page stated, \"[id number] [id number] Olga Bailey. FYI pt triggered sepsis, LA 2.3, RRT called. VSS on 2L NC, pt denies any symptoms. Thanks Alla WEBSTER [phone]\"    Pt resting comfortably in bed. VSS on 2L NC. Pt denies pain, nausea, chest pain, or SOB.     Will continue to monitor and follow POC.     ADDENDUM at 1812: Received call back from [facility] 3 intern at 1613. Q2hr VS ordered as well as recheck of LA at 2200 this evening.   "

## 2021-05-05 NOTE — PLAN OF CARE
Neuro: A&Ox4. Able to make needs known, less anxious overnight, no bipap. Slept well. Weaned oxygen to 2L tolerating.  Cardiac: SR-ST. VSS. Afebrile.   Respiratory: Sating mid 90's on 2L.  GI/: Adequate urine output per purewick, no bm  Diet/appetite: Tolerating DD1  nectar thick diet. Pills crushed in applesauce.  Activity:  Assist of 2 , up to turned q 2 hours.  Pain: At acceptable level on current regimen. Denies  Skin: mepilite on bilateral face for wounds. Bruising noted on abdomen.   LDA's:PICC tpn/lipids, purewick    Plan: Pt stated she was stressed this am and wanting something for it before radiation, denied pain. Seroquel given and pt wanting something stronger, offered ativan and given prior to leaving. Radiation tech did mention she was quite stressed while down stairs during treatment yesterday.  Radiation daily. Continue with POC. Notify primary team with changes.

## 2021-05-06 ENCOUNTER — APPOINTMENT (OUTPATIENT)
Dept: OCCUPATIONAL THERAPY | Facility: CLINIC | Age: 76
DRG: 177 | End: 2021-05-06
Attending: STUDENT IN AN ORGANIZED HEALTH CARE EDUCATION/TRAINING PROGRAM
Payer: MEDICARE

## 2021-05-06 ENCOUNTER — APPOINTMENT (OUTPATIENT)
Dept: PHYSICAL THERAPY | Facility: CLINIC | Age: 76
DRG: 177 | End: 2021-05-06
Attending: STUDENT IN AN ORGANIZED HEALTH CARE EDUCATION/TRAINING PROGRAM
Payer: MEDICARE

## 2021-05-06 ENCOUNTER — APPOINTMENT (OUTPATIENT)
Dept: SPEECH THERAPY | Facility: CLINIC | Age: 76
DRG: 177 | End: 2021-05-06
Attending: STUDENT IN AN ORGANIZED HEALTH CARE EDUCATION/TRAINING PROGRAM
Payer: MEDICARE

## 2021-05-06 ENCOUNTER — ALLIED HEALTH/NURSE VISIT (OUTPATIENT)
Dept: RADIATION ONCOLOGY | Facility: CLINIC | Age: 76
End: 2021-05-06
Attending: STUDENT IN AN ORGANIZED HEALTH CARE EDUCATION/TRAINING PROGRAM
Payer: MEDICARE

## 2021-05-06 DIAGNOSIS — C71.9 GLIOBLASTOMA (H): Primary | ICD-10-CM

## 2021-05-06 LAB
ALBUMIN SERPL-MCNC: 2.6 G/DL (ref 3.4–5)
ALP SERPL-CCNC: 102 U/L (ref 40–150)
ALT SERPL W P-5'-P-CCNC: 37 U/L (ref 0–50)
ANION GAP SERPL CALCULATED.3IONS-SCNC: 4 MMOL/L (ref 3–14)
AST SERPL W P-5'-P-CCNC: 26 U/L (ref 0–45)
BILIRUB SERPL-MCNC: 0.3 MG/DL (ref 0.2–1.3)
BUN SERPL-MCNC: 33 MG/DL (ref 7–30)
CALCIUM SERPL-MCNC: 8.7 MG/DL (ref 8.5–10.1)
CHLORIDE SERPL-SCNC: 104 MMOL/L (ref 94–109)
CO2 SERPL-SCNC: 31 MMOL/L (ref 20–32)
CREAT SERPL-MCNC: 0.41 MG/DL (ref 0.52–1.04)
ERYTHROCYTE [DISTWIDTH] IN BLOOD BY AUTOMATED COUNT: 20.5 % (ref 10–15)
GFR SERPL CREATININE-BSD FRML MDRD: >90 ML/MIN/{1.73_M2}
GLUCOSE BLDC GLUCOMTR-MCNC: 117 MG/DL (ref 70–99)
GLUCOSE BLDC GLUCOMTR-MCNC: 137 MG/DL (ref 70–99)
GLUCOSE BLDC GLUCOMTR-MCNC: 152 MG/DL (ref 70–99)
GLUCOSE BLDC GLUCOMTR-MCNC: 90 MG/DL (ref 70–99)
GLUCOSE SERPL-MCNC: 161 MG/DL (ref 70–99)
HCT VFR BLD AUTO: 29.6 % (ref 35–47)
HGB BLD-MCNC: 9 G/DL (ref 11.7–15.7)
MAGNESIUM SERPL-MCNC: 2.1 MG/DL (ref 1.6–2.3)
MCH RBC QN AUTO: 31 PG (ref 26.5–33)
MCHC RBC AUTO-ENTMCNC: 30.4 G/DL (ref 31.5–36.5)
MCV RBC AUTO: 102 FL (ref 78–100)
PHOSPHATE SERPL-MCNC: 3.6 MG/DL (ref 2.5–4.5)
PLATELET # BLD AUTO: 187 10E9/L (ref 150–450)
POTASSIUM SERPL-SCNC: 4 MMOL/L (ref 3.4–5.3)
PROT SERPL-MCNC: 5.5 G/DL (ref 6.8–8.8)
RBC # BLD AUTO: 2.9 10E12/L (ref 3.8–5.2)
SODIUM SERPL-SCNC: 139 MMOL/L (ref 133–144)
WBC # BLD AUTO: 9.6 10E9/L (ref 4–11)

## 2021-05-06 PROCEDURE — 93005 ELECTROCARDIOGRAM TRACING: CPT

## 2021-05-06 PROCEDURE — 93010 ELECTROCARDIOGRAM REPORT: CPT | Performed by: INTERNAL MEDICINE

## 2021-05-06 PROCEDURE — 250N000013 HC RX MED GY IP 250 OP 250 PS 637: Performed by: INTERNAL MEDICINE

## 2021-05-06 PROCEDURE — 250N000011 HC RX IP 250 OP 636: Performed by: STUDENT IN AN ORGANIZED HEALTH CARE EDUCATION/TRAINING PROGRAM

## 2021-05-06 PROCEDURE — 83735 ASSAY OF MAGNESIUM: CPT | Performed by: STUDENT IN AN ORGANIZED HEALTH CARE EDUCATION/TRAINING PROGRAM

## 2021-05-06 PROCEDURE — 97530 THERAPEUTIC ACTIVITIES: CPT | Mod: GP

## 2021-05-06 PROCEDURE — 80053 COMPREHEN METABOLIC PANEL: CPT | Performed by: STUDENT IN AN ORGANIZED HEALTH CARE EDUCATION/TRAINING PROGRAM

## 2021-05-06 PROCEDURE — 82962 GLUCOSE BLOOD TEST: CPT

## 2021-05-06 PROCEDURE — 258N000003 HC RX IP 258 OP 636: Performed by: STUDENT IN AN ORGANIZED HEALTH CARE EDUCATION/TRAINING PROGRAM

## 2021-05-06 PROCEDURE — 92526 ORAL FUNCTION THERAPY: CPT | Mod: GN

## 2021-05-06 PROCEDURE — 97110 THERAPEUTIC EXERCISES: CPT | Mod: GO

## 2021-05-06 PROCEDURE — 77386 HC IMRT TREATMENT DELIVERY, COMPLEX: CPT | Performed by: STUDENT IN AN ORGANIZED HEALTH CARE EDUCATION/TRAINING PROGRAM

## 2021-05-06 PROCEDURE — 84100 ASSAY OF PHOSPHORUS: CPT | Performed by: STUDENT IN AN ORGANIZED HEALTH CARE EDUCATION/TRAINING PROGRAM

## 2021-05-06 PROCEDURE — 36415 COLL VENOUS BLD VENIPUNCTURE: CPT | Performed by: STUDENT IN AN ORGANIZED HEALTH CARE EDUCATION/TRAINING PROGRAM

## 2021-05-06 PROCEDURE — 99232 SBSQ HOSP IP/OBS MODERATE 35: CPT | Mod: GC | Performed by: INTERNAL MEDICINE

## 2021-05-06 PROCEDURE — 250N000009 HC RX 250: Performed by: INTERNAL MEDICINE

## 2021-05-06 PROCEDURE — 250N000013 HC RX MED GY IP 250 OP 250 PS 637: Performed by: STUDENT IN AN ORGANIZED HEALTH CARE EDUCATION/TRAINING PROGRAM

## 2021-05-06 PROCEDURE — 250N000012 HC RX MED GY IP 250 OP 636 PS 637: Performed by: STUDENT IN AN ORGANIZED HEALTH CARE EDUCATION/TRAINING PROGRAM

## 2021-05-06 PROCEDURE — 97110 THERAPEUTIC EXERCISES: CPT | Mod: GP

## 2021-05-06 PROCEDURE — 120N000003 HC R&B IMCU UMMC

## 2021-05-06 PROCEDURE — 85027 COMPLETE CBC AUTOMATED: CPT | Performed by: STUDENT IN AN ORGANIZED HEALTH CARE EDUCATION/TRAINING PROGRAM

## 2021-05-06 RX ADMIN — I.V. FAT EMULSION 250 ML: 20 EMULSION INTRAVENOUS at 20:40

## 2021-05-06 RX ADMIN — LORAZEPAM 0.5 MG: 2 INJECTION INTRAMUSCULAR; INTRAVENOUS at 06:43

## 2021-05-06 RX ADMIN — QUETIAPINE 25 MG: 25 TABLET ORAL at 08:16

## 2021-05-06 RX ADMIN — QUETIAPINE 50 MG: 50 TABLET ORAL at 21:48

## 2021-05-06 RX ADMIN — Medication 2.5 MG: at 20:30

## 2021-05-06 RX ADMIN — ACETAMINOPHEN 975 MG: 325 TABLET, FILM COATED ORAL at 13:19

## 2021-05-06 RX ADMIN — Medication 2.5 MG: at 08:15

## 2021-05-06 RX ADMIN — QUETIAPINE 25 MG: 25 TABLET ORAL at 18:36

## 2021-05-06 RX ADMIN — FUROSEMIDE 40 MG: 40 TABLET ORAL at 08:18

## 2021-05-06 RX ADMIN — ACYCLOVIR: 50 OINTMENT TOPICAL at 12:43

## 2021-05-06 RX ADMIN — LACTULOSE 3 G: 20 SOLUTION ORAL at 08:31

## 2021-05-06 RX ADMIN — LINACLOTIDE 290 MCG: 145 CAPSULE, GELATIN COATED ORAL at 08:24

## 2021-05-06 RX ADMIN — AMLODIPINE BESYLATE 10 MG: 10 TABLET ORAL at 08:19

## 2021-05-06 RX ADMIN — ACYCLOVIR: 50 OINTMENT TOPICAL at 06:43

## 2021-05-06 RX ADMIN — PANTOPRAZOLE SODIUM 40 MG: 40 TABLET, DELAYED RELEASE ORAL at 16:17

## 2021-05-06 RX ADMIN — ACETAMINOPHEN 975 MG: 325 TABLET, FILM COATED ORAL at 20:30

## 2021-05-06 RX ADMIN — SULFAMETHOXAZOLE AND TRIMETHOPRIM 1 TABLET: 400; 80 TABLET ORAL at 08:22

## 2021-05-06 RX ADMIN — BUSPIRONE HYDROCHLORIDE 30 MG: 15 TABLET ORAL at 08:19

## 2021-05-06 RX ADMIN — PANTOPRAZOLE SODIUM 40 MG: 40 TABLET, DELAYED RELEASE ORAL at 08:27

## 2021-05-06 RX ADMIN — DOXEPIN HYDROCHLORIDE 3 MG: 10 SOLUTION ORAL at 21:49

## 2021-05-06 RX ADMIN — INSULIN ASPART 1 UNITS: 100 INJECTION, SOLUTION INTRAVENOUS; SUBCUTANEOUS at 12:45

## 2021-05-06 RX ADMIN — FLUOXETINE HYDROCHLORIDE 60 MG: 40 CAPSULE ORAL at 08:24

## 2021-05-06 RX ADMIN — LEVETIRACETAM 250 MG: 100 INJECTION, SOLUTION, CONCENTRATE INTRAVENOUS at 21:50

## 2021-05-06 RX ADMIN — LACTULOSE 3 G: 20 SOLUTION ORAL at 18:36

## 2021-05-06 RX ADMIN — DEXAMETHASONE 4 MG: 4 TABLET ORAL at 08:16

## 2021-05-06 RX ADMIN — POLYETHYLENE GLYCOL 3350 17 G: 17 POWDER, FOR SOLUTION ORAL at 09:59

## 2021-05-06 RX ADMIN — Medication 2.5 MG: at 13:20

## 2021-05-06 RX ADMIN — LEVETIRACETAM 250 MG: 100 INJECTION, SOLUTION, CONCENTRATE INTRAVENOUS at 09:11

## 2021-05-06 RX ADMIN — ACYCLOVIR: 50 OINTMENT TOPICAL at 00:04

## 2021-05-06 RX ADMIN — ACETAMINOPHEN 975 MG: 325 TABLET, FILM COATED ORAL at 08:22

## 2021-05-06 RX ADMIN — ACYCLOVIR: 50 OINTMENT TOPICAL at 03:58

## 2021-05-06 RX ADMIN — BUSPIRONE HYDROCHLORIDE 30 MG: 15 TABLET ORAL at 20:30

## 2021-05-06 RX ADMIN — ENOXAPARIN SODIUM 40 MG: 100 INJECTION SUBCUTANEOUS at 18:34

## 2021-05-06 RX ADMIN — ACYCLOVIR: 50 OINTMENT TOPICAL at 09:41

## 2021-05-06 RX ADMIN — MESALAMINE 1000 MG: 1000 SUPPOSITORY RECTAL at 21:52

## 2021-05-06 ASSESSMENT — ACTIVITIES OF DAILY LIVING (ADL)
ADLS_ACUITY_SCORE: 19
ADLS_ACUITY_SCORE: 19
ADLS_ACUITY_SCORE: 21
ADLS_ACUITY_SCORE: 19

## 2021-05-06 NOTE — PROGRESS NOTES
Calorie Count  Intake recorded for: 5/5  Total Kcals: 1298 Total Protein: 46g  Kcals from Hospital Food: 1298   Protein: 46g  Kcals from Outside Food (average):0  Protein: 0g  # Meals Ordered from Kitchen: 3 meals + food from unit   # Meals Recorded: 4 meals (First - 100% oatmeal w/ brown sugar)    (Second - 100% 4oz apple juice, pureed beef w/ gravy, 75% mashed potatoes w/ gravy, pureed green beans)    (Third - 100% 4oz apple juice, pureed berries, 75% pureed pizza, pureed green beans)    (Fourth -  100% apple juice, 50% applesauce - food from unit)  # Supplements Recorded: 50% of 1 Magic Cup

## 2021-05-06 NOTE — PLAN OF CARE
Neuro: A&Ox4.   Cardiac: SR/ST. HR 90-100s, when pt returned from radiation treatment, -130s. MD Mcdowell made aware. No further interventions at this time. HR now 110s. SBP stable. Afebrile. VSS.   Respiratory: Sating>90% on 1L NC.  GI/: Adequate urine output,purewick in placed. BM X2  Diet/appetite: Tolerating DD1 diet with nectar thickened liquids. Eating well. Claudio counts.  Activity:  Assist of 2 + lift, PT/OT.  Pain: At acceptable level on current regimen.   Skin: No new deficits noted.  LDA's: R PICC.    Radiation therapy completed this AM. TPN discontinued, continue with claudio counts. Will continue to monitor.     Plan: Continue with POC. Notify primary team with changes.

## 2021-05-06 NOTE — LETTER
2021         RE: Olga Bailey  [address] [address]        Dear Colleague,    Thank you for referring your patient, Olga Bailey, to the [facility] RADIATION ONCOLOGY. Please see a copy of my visit note below.    [facility] PHYSICIANS  SPECIALIZING IN BREAKTHROUGHS  Radiation Oncology    On Treatment Visit Note      Olga Bailey      Date: 2021   MRN: [id number]   : 1945       Reason for Visit:  On Radiation Treatment Visit     Treatment Summary to Date   1. Lt frontoparietal brain   801/4,005 cGy   3/15 fractions     ED Visit/Hosiptal Admission: Currently remains inpatient     Unplanned treatment Breaks: No    Subjective: Mrs. Bailey is seen in our department for on-treatment visit, and her daughter, La Nena, is present through conference call. Mrs. Bailey is tolerating radiation well. She has no headache, nausea/vomiting, dizziness, lightheadedness, or fatigue. She has no focal weakness or numbness that she notices. She is on steroid tapering, and has no heartburn or chest pain. She feels her breathing has been improving, and denies fever, chill, or cough.     Objective:     Gen: Appears well, lying in patient, and is in no acute distress on 1-2L of NC.   Skin: No erythema in the scalp over the radiation field.  Resp: Breathing comfortably with nasal cannula   Cards: well perfused   Psych: appropriate         Labs:  CBC RESULTS:   Recent Labs   Lab Test 21  0441   WBC 9.6   RBC 2.90*   HGB 9.0*   HCT 29.6*   *   MCH 31.0   MCHC 30.4*   RDW 20.5*        ELECTROLYTES:  Recent Labs   Lab Test 21  0441      POTASSIUM 4.0   CHLORIDE 104   ESTIVEN 8.7   CO2 31   BUN 33*   CR 0.41*   *       Assessment:    Ms. Bailey is a 75-year-old female who is diagnosed with left frontoparietal glioblastoma (IDH-R132H/IDH 1 and 2 wild type, MGMT methylated), and is status post gross total resection in 2021. She is  initiated on adjuvant radiation with some delay related to her complex presentation of acute hypoxic respiratory failure, for which she remains inpatient on close monitoring. She is tolerating radiation therapy well. We reviewed her case again with Dr. Baker of Neuro-Oncology who we will be in contact with regarding her functional status for potentia futurel start of temozolomide as her clinical status become more stable and her performance status continues to improve. All questions and concerns addressed.    Toxicities:  None    Plan:   1. Continue current therapy.    2. We will continue to follow along and re-assess.    Mosaiq chart and setup information reviewed  MVCT/IGRT images checked    The patient was seen and discussed with staff, Dr. Spann.    Dave Rios MD  Resident, PGY-3  Department of Radiation Oncology  [facility]    A medical resident participated in the care of this patient and in the preparation of this note. I have verified and edited this note. I personally performed the physical exam and medical decision making for this patient.  I agree with the assessment and plan of care as documented in the note above    Betsy Spann MD, PhD     Department of Radiation Oncology  [facility]

## 2021-05-06 NOTE — PLAN OF CARE
Neuro: A&Ox4. Anxious at times, prn ativan given once per MAR.   Cardiac: SR. VSS.   Respiratory: Sating adequately on 2L at start of shift. Weaned to 1L NC, trialed room air and pt desated to 89%, 1L NC placed back on pt.   GI/: Adequate urine output.  Diet/appetite: Tolerating DD1 diet with nectar thick liquids.   Activity:  Assist of 2, repositioned in bed throughout the shift.  Pain: At acceptable level on current regimen.   Skin: No new deficits noted.  LDA's: PICC with TPN and lipids, purwick.    Plan: Continue with POC. Notify primary team with changes. Pt slept well overnight. Plan for radiation today.

## 2021-05-06 NOTE — PROGRESS NOTES
[facility] PHYSICIANS  SPECIALIZING IN BREAKTHROUGHS  Radiation Oncology    On Treatment Visit Note      Olga Bailey      Date: 2021   MRN: [id number]   : 1945       Reason for Visit:  On Radiation Treatment Visit     Treatment Summary to Date   1. Lt frontoparietal brain   801/4,005 cGy   3/15 fractions     ED Visit/Hosiptal Admission: Currently remains inpatient     Unplanned treatment Breaks: No    Subjective: Mrs. Bailey is seen in our department for on-treatment visit, and her daughter, La Nena, is present through conference call. Mrs. Bailey is tolerating radiation well. She has no headache, nausea/vomiting, dizziness, lightheadedness, or fatigue. She has no focal weakness or numbness that she notices. She is on steroid tapering, and has no heartburn or chest pain. She feels her breathing has been improving, and denies fever, chill, or cough.     Objective:     Gen: Appears well, lying in patient, and is in no acute distress on 1-2L of NC.   Skin: No erythema in the scalp over the radiation field.  Resp: Breathing comfortably with nasal cannula   Cards: well perfused   Psych: appropriate         Labs:  CBC RESULTS:   Recent Labs   Lab Test 21  0441   WBC 9.6   RBC 2.90*   HGB 9.0*   HCT 29.6*   *   MCH 31.0   MCHC 30.4*   RDW 20.5*        ELECTROLYTES:  Recent Labs   Lab Test 21  0441      POTASSIUM 4.0   CHLORIDE 104   ESTIVEN 8.7   CO2 31   BUN 33*   CR 0.41*   *       Assessment:    Ms. Bailey is a 75-year-old female who is diagnosed with left frontoparietal glioblastoma (IDH-R132H/IDH 1 and 2 wild type, MGMT methylated), and is status post gross total resection in 2021. She is initiated on adjuvant radiation with some delay related to her complex presentation of acute hypoxic respiratory failure, for which she remains inpatient on close monitoring. She is tolerating radiation therapy well. We reviewed her case again with Dr. Baker of  Neuro-Oncology who we will be in contact with regarding her functional status for lelia futurel start of temozolomide as her clinical status become more stable and her performance status continues to improve. All questions and concerns addressed.    Toxicities:  None    Plan:   1. Continue current therapy.    2. We will continue to follow along and re-assess.    Mosaiq chart and setup information reviewed  MVCT/IGRT images checked    The patient was seen and discussed with staff, Dr. Spann.    Dave Rios MD  Resident, PGY-3  Department of Radiation Oncology  [facility]    A medical resident participated in the care of this patient and in the preparation of this note. I have verified and edited this note. I personally performed the physical exam and medical decision making for this patient.  I agree with the assessment and plan of care as documented in the note above    Betsy Spann MD, PhD     Department of Radiation Oncology  [facility]

## 2021-05-06 NOTE — PROGRESS NOTES
[facility]    Progress Note - [place] 3 Service        Date of Admission:  4/26/2021    Assessment & Plan   Olga Bailey is a 75 y.o. F w/ GBM s/p resection (Feb 2021), depression/anxiety, HTN, asthma, and GERD, who was transferred to [facility] for consideration of inpt rad/onc treatment while she continues to be treated for complex presentation of acute hypoxic respiratory failure (due to possible PJP pneumonia and/or TRALI), 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    Today:  - Dexamethasone 4mg Qday  - Radiation therapy today   - Plan for discharge in coming 1-2 days  - Discontinuing TPN, improved PO intake     Acute hypoxic respiratory failure   Bilateral Pneumonia; presumed PJP s/p ABx treatment  Concern for transfusion-related acute lung injury (TRALI)  Concern for R hemidiaphragm paralysis  Dyspneic/hypoxic in late March for admission at OSH. Initially treated with ceftri/doxy. ID switched to course of Zosyn/doxy with Bactrim treatment dosing for suspected PJP (had been on steroids after admission for glioblastoma resection; Fungitell positive but Galactomannan negative). Unable to get adequate sputum samples for diagnosis of PJP. Improved with the Bactrim until several days after RP bleed (see below) and was c/f TRALI as well. W/u for PE, COVID, and respiratory viral panel was negative. Bilateral lung opacities present. Concern for ongoing PJP infection (Bactrim was re-started), inflammatory lung condition (increased steroids), volume overload (diuresed for several days with IV Lasix). Imaging shows R hemidiaphragm elevation (concerning for paralysis?), and opacities appear to be improving as are oxygen requirements. Holding off on bronch per pulm recs and pt/family preference given likely need for intubation. Has had several RRTs for tachypnea and tachycardia, appear primarily related to anxiety. Improve with PRN anxiety  medication and Bipap for comfort. Full work-up largely unremarkable. No improvement in O2 needs after diuresis here at [facility].  - Pulmonology consulted, following peripherally    - F/U w/ Pulmonary in 2-3 months with repeat CT imaging,  complete PFTs +/- six minute walk  - Furosemide PO 40mg QAM beginning 5/5 - goal net 0 to -500 ml daily  - pulm, ID previously consulted   - wean steroids: from methylpred IV 62.5 mg BID -> prednisone 60 mg x 2 d (starting 4/28) -> pred 40 mg x 2 d -> pred 30 mg x 1 d -> pred 20 mg x 1 d - discontinued on 5/4   - Will monitor for signs of adrenal insufficiency    - PJP ppx: Bactrim single strength qday x 1.5 mo after steroids end (completed 3 week treatment course at OSH)  - PJP stain, PCR; conventional sputum culture ordered if patient can produce sputum   - f/u blood cultures - NGTD  - wean O2 as able to keep sats > 92%  - Bipap when sleeping and PRN for comfort  - SLP consulted - DD1 and nectar thick liquids     Glioblastoma Multiforme  Left frontoparietal brain glioblastoma status post resection 2/23/2021. Seen by radiation oncology 3/24 recommended XRT and chemo radiation. However subsequently admitted for resp failure. MRI brain here signs of possible disease recurrence. Hem/onc concerned that she might not be a candidate for chemotherapy or radiation currently due to poor performance status.  -Rad/onc following; appreciate recs   -Patient being presented at CNS tumor conference 5/3   -Radiation course inpatient 5/4  -Heme/onc consulted, appreciate recs   - Patient not strong enough for chemo   - would consider temozolomide at late date pending clinical  course (needs to improve performance status)      Major depressive disorder, recurrent  Anxiety  Follows with psychiatry and psychology as outpatint.  Saw health psychology during admission for GBM, made plan for outpatient follow-up and med adjustment,  but was unable.  Given prolonged admission patient could benefit from  inpatient consultation again and patient requesting this.  - Psychiatry consulted, appreciate recs  - Palliative recs appreciated;  - Seroquel 25 mg BID + 50 mg at bedtime  - Ativan 0.5 mg q4h PRN - IV per pt request  - Scheduled doxepin PRN at night for sleep  - c/t PTA Buspar  - PTA prozac (dose reduced from 80 to 60 mg)  - Scheduled oxycodone TID + PRN for dyspnea  - Health psychology consult; appreciate recs     Bilateral lower extremity DVT  Right retroperitoneal hematoma   On 3/29 at outside hospital patient's O2 needs increased, duplex ultrasound revealed bilateral lower extremity DVT. CT PE negative for embolism.  Treated with IV heparin and transitioned to Lovenox 70 mg.  On 4/14 this was complicated by retroperitoneal bleed, requiring 4 units of packed red blood cells.  Lovenox was held, and IVC filter was placed on 4/16. Vascular surgery was involved, and a CT abdomen was stable bleed so no surgical intervention was required.  Eventually resumed on prophylactic 40mg of Lovenox twice daily.  -Continue 40 mg Lovenox qday  -hem/onc consulted; appreciate recs   - pain plan:              - Tylenol 975 mg q8h brianna              - oxycodone 2.5-5 mg q4h PRN for pain              - discontinued Dilaudid to avoid delirium  - Asymmetric foot swelling , 4/30/2021. Will not obtain US given there are no changes we would make to the anticoagulation plan; discussed with heme on 4/29/21    Ileus  Began after bleed at outside hospital 4/14; was on TPN for nutrition.  GI was consulted for assistance with bowel management.  With escalating bowel regimen finally had small BM 4/25 after tap water enema, had some liquid stool 4/27. AXR shows non-obstructive bowel gas pattern.  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 Urine sodium 50 while her sodium was 128, certainly consistent with this. Neurologic etiology with her GBM resection likely. Now trending up.   - trend Na; consider fluid restriction if any concern for worsening Na     Shock Liver, improving  LFTs sharply up after RP bleed at OSH, [place] due to shock liver. LFT's have steadily trended down since. Only AST remains elevated and trending down.   -Recheck CMP in am    Steroid-induced hyperglycemia   Intermittently requiring insulin  -Low sliding scale insulin  -Gluc checks     Leukocytosis  Ddx is infection vs steroid-induced.  - trend CBC     Hypertension  Sinus tachycardia  Started on metoprolol at OSH for sinus tachycardia.  - c/t PTA amlodipine 10 mg qday  - hold BB    Chronic medical problems:  - albuterol PRN  - BIPAP at bedtime (on CPAP at home) and for comfort  - GERD: back to PTA PO PPI     Diet: Dysphagia Diet Level 1 Pureed Nectar Thickened Liquids (pre-thickened or use instant food thickener)  Calorie Counts  parenteral nutrition - ADULT compounded formula CYCLE  Snacks/Supplements Adult: Ensure Enlive; Between Meals  Snacks/Supplements Adult: Magic Cup; With Meals    Fluids: TPN  Lines: PICC line  DVT Prophylaxis: Enoxaparin (Lovenox) SQ  Martino Catheter: not present  Code Status: Full Code         Disposition Plan   Expected discharge: > 7 days, recommended to transitional care unit once respiratory status improves, plan for rehab and chemo/radiation in place .  Entered: Marvel Mcdowell MD 05/06/2021, 7:40 AM     The patient's care was discussed with the Attending Physician, Dr. Gordon.    Marvel Mcdowell MD  PGY-1, Internal Medicine  [facility]  ______________________________________________________________________    Interval History    Nursing notes reviewed, no acute events overnight. Continues to feel well, eating has been  improving. Calorie counts improved up to 1200 on 5/5. Optimistic about discharge in the coming days. No dyspnea, fevers, changes in urination/BMs.    4 Point ROS obtained and negative except as noted above.     Data reviewed today: I reviewed all medications, new labs and imaging results over the last 24 hours. I personally reviewed     Physical Exam   Vital Signs: Temp: 97.6  F (36.4  C) Temp src: Oral BP: (!) 155/86 Pulse: 96   Resp: 22 SpO2: 93 % O2 Device: Nasal cannula Oxygen Delivery: 1 LPM  Weight: 169 lbs 8.54 oz   General Appearance: Alert, awake, sitting upright in bed.  Eyes: no scleral icterus, EOMI  HEENT: neck supple, normocephalic, NC in place. Dry appearing mucous membranes   Respiratory: No accessory muscle use, mildly course breath sounds in bilateral lung fields anteriorly. Nasal cannula in place.  Cardiovascular: Tachycardia, regular rhythm, normal S1/S2, no murmurs appreciated, intact distal pulses  GI: soft, non-tender, non-distended  Skin: scattered bruises over upper exposed upper extremities, no rash noted. 1+ pitting edema to bilateral mid-tibia.  Musculoskeletal: pedal edema in the left foot; no edema of right foot.  Neurologic: Moves all extremities spontaneously, RUE mildly weaker (3/5) compared to LUE, follows commands.   Psychiatric: Pleasant affect, happy.     Data   Recent Labs   Lab 05/06/21  0441 05/05/21  0505 05/04/21  0532 05/03/21  0502 05/02/21  0716   WBC 9.6 9.1 8.7 9.3 10.4   HGB 9.0* 8.2* 8.4* 8.3* 8.6*   * 98 98 96 95    191 195 199 200   INR  --   --   --  0.99  --     141 141 139 139   POTASSIUM 4.0 3.7 3.8 3.6 3.8   CHLORIDE 104 107 106 103 103   CO2 31 31 32 31 30   BUN 33* 38* 35* 35* 32*   CR 0.41* 0.28* 0.32* 0.37* 0.37*   ANIONGAP 4 3 3 5 6   ESTIVEN 8.7 9.0 8.8 8.8 8.5   * 157* 152* 123* 128*   ALBUMIN 2.6* 2.4* 2.4* 2.4* 2.2*   PROTTOTAL 5.5* 5.1* 5.1* 5.2* 5.2*   BILITOTAL 0.3 0.3 0.4 0.3 0.4   ALKPHOS 102 106 110 111 116   ALT 37 40 40  44 50   AST 26 27 26 25 32   TROPI  --   --   --   --  0.035     No results found for this or any previous visit (from the past 24 hour(s)).

## 2021-05-06 NOTE — PROGRESS NOTES
Brief Pulmonary Progress Note - 5/5/2021    Subjective: patient reports feeling overall improved from a respiratory perspective compared to one week ago. No new concerns. Discussed importance of using flutter valve, incentive spirometer and getting out of bed to ambulate as much as possible.    Most recent CT scan on 5/2/2021 shows similar extensive bilateral infiltrates with maybe some areas of fibrosis/scarring.    Recommendations:  - no indication for increasing steroids  or bronchoscopy given clinical improvement  - continue pulmonary hygiene with incentive spirometry, flutter valve and ambulation  - outpatient follow up with Pulmonary Medicine in two to three months with repeat CT imaging, complete PFTs +/- six minute walk    Patient seen and plan discussed with Pulmonary attending, Dr. Kay.    Cole Egan M.D.  Pulmonary and Critical Care Medicine Fellow  5/5/2021

## 2021-05-07 ENCOUNTER — APPOINTMENT (OUTPATIENT)
Dept: PHYSICAL THERAPY | Facility: CLINIC | Age: 76
DRG: 177 | End: 2021-05-07
Attending: STUDENT IN AN ORGANIZED HEALTH CARE EDUCATION/TRAINING PROGRAM
Payer: MEDICARE

## 2021-05-07 ENCOUNTER — APPOINTMENT (OUTPATIENT)
Dept: OCCUPATIONAL THERAPY | Facility: CLINIC | Age: 76
DRG: 177 | End: 2021-05-07
Attending: STUDENT IN AN ORGANIZED HEALTH CARE EDUCATION/TRAINING PROGRAM
Payer: MEDICARE

## 2021-05-07 ENCOUNTER — APPOINTMENT (OUTPATIENT)
Dept: SPEECH THERAPY | Facility: CLINIC | Age: 76
DRG: 177 | End: 2021-05-07
Attending: STUDENT IN AN ORGANIZED HEALTH CARE EDUCATION/TRAINING PROGRAM
Payer: MEDICARE

## 2021-05-07 ENCOUNTER — ALLIED HEALTH/NURSE VISIT (OUTPATIENT)
Dept: RADIATION ONCOLOGY | Facility: CLINIC | Age: 76
End: 2021-05-07
Attending: STUDENT IN AN ORGANIZED HEALTH CARE EDUCATION/TRAINING PROGRAM
Payer: MEDICARE

## 2021-05-07 DIAGNOSIS — B59 PNEUMONIA OF BOTH LUNGS DUE TO PNEUMOCYSTIS JIROVECII, UNSPECIFIED PART OF LUNG (H): ICD-10-CM

## 2021-05-07 DIAGNOSIS — J96.01 ACUTE RESPIRATORY FAILURE WITH HYPOXIA (H): Primary | ICD-10-CM

## 2021-05-07 LAB
ALBUMIN SERPL-MCNC: 2.6 G/DL (ref 3.4–5)
ALP SERPL-CCNC: 105 U/L (ref 40–150)
ALT SERPL W P-5'-P-CCNC: 40 U/L (ref 0–50)
ANION GAP SERPL CALCULATED.3IONS-SCNC: 3 MMOL/L (ref 3–14)
ANION GAP SERPL CALCULATED.3IONS-SCNC: 4 MMOL/L (ref 3–14)
AST SERPL W P-5'-P-CCNC: 26 U/L (ref 0–45)
BILIRUB SERPL-MCNC: 0.4 MG/DL (ref 0.2–1.3)
BUN SERPL-MCNC: 24 MG/DL (ref 7–30)
BUN SERPL-MCNC: 27 MG/DL (ref 7–30)
CALCIUM SERPL-MCNC: 8.7 MG/DL (ref 8.5–10.1)
CALCIUM SERPL-MCNC: 9.2 MG/DL (ref 8.5–10.1)
CHLORIDE SERPL-SCNC: 104 MMOL/L (ref 94–109)
CHLORIDE SERPL-SCNC: 105 MMOL/L (ref 94–109)
CO2 SERPL-SCNC: 31 MMOL/L (ref 20–32)
CO2 SERPL-SCNC: 32 MMOL/L (ref 20–32)
CREAT SERPL-MCNC: 0.4 MG/DL (ref 0.52–1.04)
CREAT SERPL-MCNC: 0.51 MG/DL (ref 0.52–1.04)
ERYTHROCYTE [DISTWIDTH] IN BLOOD BY AUTOMATED COUNT: 20.8 % (ref 10–15)
GFR SERPL CREATININE-BSD FRML MDRD: >90 ML/MIN/{1.73_M2}
GFR SERPL CREATININE-BSD FRML MDRD: >90 ML/MIN/{1.73_M2}
GLUCOSE BLDC GLUCOMTR-MCNC: 150 MG/DL (ref 70–99)
GLUCOSE BLDC GLUCOMTR-MCNC: 184 MG/DL (ref 70–99)
GLUCOSE BLDC GLUCOMTR-MCNC: 233 MG/DL (ref 70–99)
GLUCOSE BLDC GLUCOMTR-MCNC: 95 MG/DL (ref 70–99)
GLUCOSE SERPL-MCNC: 119 MG/DL (ref 70–99)
GLUCOSE SERPL-MCNC: 85 MG/DL (ref 70–99)
HCT VFR BLD AUTO: 29.3 % (ref 35–47)
HGB BLD-MCNC: 9 G/DL (ref 11.7–15.7)
INTERPRETATION ECG - MUSE: NORMAL
MAGNESIUM SERPL-MCNC: 2 MG/DL (ref 1.6–2.3)
MAGNESIUM SERPL-MCNC: 2.1 MG/DL (ref 1.6–2.3)
MCH RBC QN AUTO: 30.7 PG (ref 26.5–33)
MCHC RBC AUTO-ENTMCNC: 30.7 G/DL (ref 31.5–36.5)
MCV RBC AUTO: 100 FL (ref 78–100)
PHOSPHATE SERPL-MCNC: 3.2 MG/DL (ref 2.5–4.5)
PLATELET # BLD AUTO: 196 10E9/L (ref 150–450)
POTASSIUM SERPL-SCNC: 3.6 MMOL/L (ref 3.4–5.3)
POTASSIUM SERPL-SCNC: 3.8 MMOL/L (ref 3.4–5.3)
PROT SERPL-MCNC: 5.5 G/DL (ref 6.8–8.8)
RBC # BLD AUTO: 2.93 10E12/L (ref 3.8–5.2)
SODIUM SERPL-SCNC: 139 MMOL/L (ref 133–144)
SODIUM SERPL-SCNC: 140 MMOL/L (ref 133–144)
WBC # BLD AUTO: 9.7 10E9/L (ref 4–11)

## 2021-05-07 PROCEDURE — 93005 ELECTROCARDIOGRAM TRACING: CPT

## 2021-05-07 PROCEDURE — 97530 THERAPEUTIC ACTIVITIES: CPT | Mod: GP

## 2021-05-07 PROCEDURE — 97116 GAIT TRAINING THERAPY: CPT | Mod: GP

## 2021-05-07 PROCEDURE — 99232 SBSQ HOSP IP/OBS MODERATE 35: CPT | Performed by: INTERNAL MEDICINE

## 2021-05-07 PROCEDURE — 82962 GLUCOSE BLOOD TEST: CPT

## 2021-05-07 PROCEDURE — 36592 COLLECT BLOOD FROM PICC: CPT | Performed by: STUDENT IN AN ORGANIZED HEALTH CARE EDUCATION/TRAINING PROGRAM

## 2021-05-07 PROCEDURE — 250N000011 HC RX IP 250 OP 636: Performed by: STUDENT IN AN ORGANIZED HEALTH CARE EDUCATION/TRAINING PROGRAM

## 2021-05-07 PROCEDURE — 97530 THERAPEUTIC ACTIVITIES: CPT | Mod: GO

## 2021-05-07 PROCEDURE — 80053 COMPREHEN METABOLIC PANEL: CPT | Performed by: STUDENT IN AN ORGANIZED HEALTH CARE EDUCATION/TRAINING PROGRAM

## 2021-05-07 PROCEDURE — 85027 COMPLETE CBC AUTOMATED: CPT | Performed by: STUDENT IN AN ORGANIZED HEALTH CARE EDUCATION/TRAINING PROGRAM

## 2021-05-07 PROCEDURE — 36415 COLL VENOUS BLD VENIPUNCTURE: CPT | Performed by: STUDENT IN AN ORGANIZED HEALTH CARE EDUCATION/TRAINING PROGRAM

## 2021-05-07 PROCEDURE — 99233 SBSQ HOSP IP/OBS HIGH 50: CPT | Performed by: INTERNAL MEDICINE

## 2021-05-07 PROCEDURE — 250N000013 HC RX MED GY IP 250 OP 250 PS 637: Performed by: STUDENT IN AN ORGANIZED HEALTH CARE EDUCATION/TRAINING PROGRAM

## 2021-05-07 PROCEDURE — 250N000013 HC RX MED GY IP 250 OP 250 PS 637: Performed by: INTERNAL MEDICINE

## 2021-05-07 PROCEDURE — 250N000012 HC RX MED GY IP 250 OP 636 PS 637: Performed by: STUDENT IN AN ORGANIZED HEALTH CARE EDUCATION/TRAINING PROGRAM

## 2021-05-07 PROCEDURE — 92526 ORAL FUNCTION THERAPY: CPT | Mod: GN | Performed by: SPEECH-LANGUAGE PATHOLOGIST

## 2021-05-07 PROCEDURE — 77386 HC IMRT TREATMENT DELIVERY, COMPLEX: CPT | Performed by: STUDENT IN AN ORGANIZED HEALTH CARE EDUCATION/TRAINING PROGRAM

## 2021-05-07 PROCEDURE — 77014 PR CT GUIDE FOR PLACEMENT RADIATION THERAPY FIELDS: CPT | Mod: 26 | Performed by: RADIOLOGY

## 2021-05-07 PROCEDURE — 93010 ELECTROCARDIOGRAM REPORT: CPT | Performed by: INTERNAL MEDICINE

## 2021-05-07 PROCEDURE — 80048 BASIC METABOLIC PNL TOTAL CA: CPT | Performed by: STUDENT IN AN ORGANIZED HEALTH CARE EDUCATION/TRAINING PROGRAM

## 2021-05-07 PROCEDURE — 120N000003 HC R&B IMCU UMMC

## 2021-05-07 PROCEDURE — 84100 ASSAY OF PHOSPHORUS: CPT | Performed by: STUDENT IN AN ORGANIZED HEALTH CARE EDUCATION/TRAINING PROGRAM

## 2021-05-07 PROCEDURE — 83735 ASSAY OF MAGNESIUM: CPT | Performed by: STUDENT IN AN ORGANIZED HEALTH CARE EDUCATION/TRAINING PROGRAM

## 2021-05-07 RX ORDER — LEVETIRACETAM 250 MG/1
250 TABLET ORAL 2 TIMES DAILY
Status: DISCONTINUED | OUTPATIENT
Start: 2021-05-07 | End: 2021-05-11

## 2021-05-07 RX ORDER — POTASSIUM CHLORIDE 750 MG/1
40 TABLET, EXTENDED RELEASE ORAL ONCE
Status: COMPLETED | OUTPATIENT
Start: 2021-05-07 | End: 2021-05-07

## 2021-05-07 RX ADMIN — ACYCLOVIR: 50 OINTMENT TOPICAL at 21:07

## 2021-05-07 RX ADMIN — MESALAMINE 1000 MG: 1000 SUPPOSITORY RECTAL at 21:05

## 2021-05-07 RX ADMIN — LACTULOSE 3 G: 20 SOLUTION ORAL at 18:26

## 2021-05-07 RX ADMIN — LINACLOTIDE 290 MCG: 145 CAPSULE, GELATIN COATED ORAL at 08:38

## 2021-05-07 RX ADMIN — INSULIN ASPART 1 UNITS: 100 INJECTION, SOLUTION INTRAVENOUS; SUBCUTANEOUS at 11:36

## 2021-05-07 RX ADMIN — ACYCLOVIR: 50 OINTMENT TOPICAL at 08:38

## 2021-05-07 RX ADMIN — Medication 2.5 MG: at 13:42

## 2021-05-07 RX ADMIN — LEVETIRACETAM 250 MG: 250 TABLET, FILM COATED ORAL at 08:53

## 2021-05-07 RX ADMIN — ACETAMINOPHEN 975 MG: 325 TABLET, FILM COATED ORAL at 08:37

## 2021-05-07 RX ADMIN — QUETIAPINE 50 MG: 50 TABLET ORAL at 21:02

## 2021-05-07 RX ADMIN — LACTULOSE 3 G: 20 SOLUTION ORAL at 08:37

## 2021-05-07 RX ADMIN — LORAZEPAM 0.5 MG: 2 INJECTION INTRAMUSCULAR; INTRAVENOUS at 16:32

## 2021-05-07 RX ADMIN — POTASSIUM CHLORIDE 40 MEQ: 750 TABLET, EXTENDED RELEASE ORAL at 21:33

## 2021-05-07 RX ADMIN — BUSPIRONE HYDROCHLORIDE 30 MG: 15 TABLET ORAL at 08:37

## 2021-05-07 RX ADMIN — PANTOPRAZOLE SODIUM 40 MG: 40 TABLET, DELAYED RELEASE ORAL at 16:10

## 2021-05-07 RX ADMIN — DOXEPIN HYDROCHLORIDE 3 MG: 10 SOLUTION ORAL at 21:05

## 2021-05-07 RX ADMIN — Medication 2.5 MG: at 20:58

## 2021-05-07 RX ADMIN — FLUOXETINE HYDROCHLORIDE 60 MG: 40 CAPSULE ORAL at 08:37

## 2021-05-07 RX ADMIN — LEVETIRACETAM 250 MG: 250 TABLET, FILM COATED ORAL at 21:02

## 2021-05-07 RX ADMIN — ACETAMINOPHEN 975 MG: 325 TABLET, FILM COATED ORAL at 20:58

## 2021-05-07 RX ADMIN — QUETIAPINE 25 MG: 25 TABLET ORAL at 18:26

## 2021-05-07 RX ADMIN — POLYETHYLENE GLYCOL 3350 17 G: 17 POWDER, FOR SOLUTION ORAL at 08:37

## 2021-05-07 RX ADMIN — BUSPIRONE HYDROCHLORIDE 30 MG: 15 TABLET ORAL at 20:58

## 2021-05-07 RX ADMIN — ENOXAPARIN SODIUM 40 MG: 100 INJECTION SUBCUTANEOUS at 16:09

## 2021-05-07 RX ADMIN — INSULIN ASPART 1 UNITS: 100 INJECTION, SOLUTION INTRAVENOUS; SUBCUTANEOUS at 18:31

## 2021-05-07 RX ADMIN — PANTOPRAZOLE SODIUM 40 MG: 40 TABLET, DELAYED RELEASE ORAL at 08:37

## 2021-05-07 RX ADMIN — AMLODIPINE BESYLATE 10 MG: 10 TABLET ORAL at 08:38

## 2021-05-07 RX ADMIN — QUETIAPINE 25 MG: 25 TABLET ORAL at 08:38

## 2021-05-07 RX ADMIN — DEXAMETHASONE 4 MG: 4 TABLET ORAL at 08:38

## 2021-05-07 RX ADMIN — SULFAMETHOXAZOLE AND TRIMETHOPRIM 1 TABLET: 400; 80 TABLET ORAL at 08:37

## 2021-05-07 RX ADMIN — Medication 2.5 MG: at 08:36

## 2021-05-07 RX ADMIN — ACETAMINOPHEN 975 MG: 325 TABLET, FILM COATED ORAL at 13:41

## 2021-05-07 RX ADMIN — FUROSEMIDE 40 MG: 40 TABLET ORAL at 08:38

## 2021-05-07 ASSESSMENT — ACTIVITIES OF DAILY LIVING (ADL)
ADLS_ACUITY_SCORE: 19

## 2021-05-07 NOTE — PROGRESS NOTES
Hematology / Oncology  Daily Progress Note   Date of Service: 05/07/2021  Patient: Olga Bailey  MRN: [id number]  Admission Date: 4/26/2021  Hospital Day # 11  Cancer Diagnosis: Glioblastoma  Primary Outpatient Oncologist: Dr. Baker  Current Treatment Plan: S/p tumor resection 2/23/21. Plan is to undergo radiation; tentative start date 5/4.     Assessment & Plan:   Olga Bailey is a 75 year old year old female with history of left frontoparietal glioblastoma s/p tumor resection 2/23/21. The plan was to undergo adjuvant chemoradiation, which has been held for prolonged admission at [facility] (3/26-4/26/21) with pneumonia and multiple DVTs, complicated by retroperitoneal hemorrhage and shock s/p IVC filter placement. She was transferred to [facility] on 4/26/21 for further Oncology and Radiation Oncology care.      Recommendations:   - Radiation fraction 4/15 completed today. Continue Dexamethasone 4 mg daily for course of treatment.  - Follow up scheduled with Dr. Baker after completion of radiation on 5/25.  - Continue PT/OT; recommendation for TCU on discharge.   - Continue Seroquel for anxiety. Appreciate psychiatry, health psychology input.     # Glioblastoma Multiforme  Follows with Dr. Baker. For further detail of oncologic history, please see below. In summary, patient was diagnosed with a left frontoparietal brain glioblastoma s/p resection 2/23/2021. Seen by Dr. Baker on 3/23, recommended temozolomide and radiation however treatment has not been initiated yet due to prolonged admission at [facility] with pneumonia, B/l LE DVTs, and RP hemorrhage. Ultimately transferred to [facility] where she could receive radiation treatment as appropriate.    - Regarding steroids, taper previously per pulm/ID of prednisone 20 mg today, followed by 10 mg tomorrow (5/4). In conjunction with radiation, please transition to Dexamethasone 4 mg daily starting 5/4.   - Care conference held 5/3 with primary team,  palliative care, heme/onc team,  and patient's family. Updated on current status/hospitalization and proposed treatment plan of 15 fractions over 15 weekdays (starting 5/4). Oral chemotherapy (Temodar) could with considered with improvement in performance status, however would not be feasible at this juncture. Patient will be able to start radiation while inpatient and could continue if she were to transition to  TCU. Questions answered at bedside. Will arrange for visit with Dr. Baker to discuss Temodar after completion of radiation..     # Bilateral LE DVTs  # Retroperitoneal bleed  B/l LE DVTs noted on 3/29/21.   - Started therapeutic anticoagulation. C/b right retroperitoneal bleed 4/14/21 and subsequently developed acute hypoxic respiratory failure.   - IVC filter placed with IR on 4/16/21.   - Bilateral LE ultrasound showed improvement of previously diagnosed DVTs, though not resolution   - keep IVC filter   - anticoagulation per primary team, but ok to continue ppx lovenox     We will continue to follow this patient. Please do not hesitate to page with any questions or concerns.     Patient was seen and plan of care was discussed with attending physician Dr. Babcock.     Radha Richardson PA-C  Hematology/Oncology   Pager: [phone]    Oncologic History:  - 2/2021 PRESENTATION: Progressive aphasia.   - 2/19/2021 MR brain imaging with 3.3 x 2.8 x 2.8 cm enhancing mass in left frontal-parietal region. A second contrast enhancing lesion (0.5 x 1.0 x 1.0 cm) in right occipital lobe which is dural based and largely stable in size since 9/2011; likely representing a meningioma.  - 2/23/2021 SURGERY: Gross total resection by Dr. Cummings  PATHOLOGY: Glioblastoma; IDH1-R132H wild-type/ IDH 1 and 2 wildtype, MGMT promoter methylated. Not BRAF mutated.   - 3/23/2021 Established care with Dr. Baker. Plan was to initiate chemotherapy with temozolomide 75mg/m2 (140mg) daily in the evening until completion of radiation.  Temozolomide held for admission to [facility]. Adjuvant radiation was planned at [facility], held for dyspnea and subsequently admitted to [facility] with acute hypoxic respiratory failure and pneumonia.   __________________________________________________________________    Subjective & Interval History:    Overnight no acute events. Patient continues to work with PT/OT and make improvements. Able to stand with assistance. Tearful and frustrated regarding long hospitalization today; reassurance and encouragement provided. Appetite is improving, however frustrated regarding needing nursing assistance to eat and often times delays. On room air now and breathing well. Tolerating radiation well without claustrophobia. Awaiting her oatmeal to come for breakfast. No new or worsening symptoms. Complete and Comprehensive review of systems review and negative other than noted here or in the HPI.     Physical Exam:    Blood pressure 118/80, pulse 115, temperature 97.5  F (36.4  C), temperature source Oral, resp. rate 18, weight 76.7 kg (169 lb 1.5 oz), SpO2 91 %.    Constitutional: Pleasant female seen sitting up in bed. Pleasant, conversational. Non- toxic appearing. No acute distress.   HEENT: Normocephalic, atraumatic. Sclerae anicteric. EOM intact.   Respiratory: Breathing comfortably on room air.   Cardiovascular: Appears well perfused  Skin: Skin is clean, dry, intact. No jaundice appreciated.   Musculoskeletal/ Extremities: No redness, warmth, or swelling of the joints appreciated.   Neurologic: Alert and oriented. Speech normal. Grossly nonfocal. Memory and thought process preserved.   Neuropsychiatric: Calm, affect congruent to situation. Appropriate mood and affect. Good judgment and insight. No visual/auditory hallucinations.    Labs & Studies: I personally reviewed the following studies:  ROUTINE LABS (Last four results):  [facility]  Recent Labs   Lab 05/07/21  0457 05/06/21  0441 05/05/21  0505 05/04/21  0532   NA  139 139 141 141   POTASSIUM 3.6 4.0 3.7 3.8   CHLORIDE 104 104 107 106   CO2 32 31 31 32   ANIONGAP 3 4 3 3   GLC 85 161* 157* 152*   BUN 24 33* 38* 35*   CR 0.40* 0.41* 0.28* 0.32*   GFRESTIMATED >90 >90 >90 >90   GFRESTBLACK >90 >90 >90 >90   ESTIVEN 8.7 8.7 9.0 8.8   MAG 2.0 2.1 2.2 2.1   PHOS 3.2 3.6 3.3 3.0   PROTTOTAL 5.5* 5.5* 5.1* 5.1*   ALBUMIN 2.6* 2.6* 2.4* 2.4*   BILITOTAL 0.4 0.3 0.3 0.4   ALKPHOS 105 102 106 110   AST 26 26 27 26   ALT 40 37 40 40     CBC  Recent Labs   Lab 05/07/21  0457 05/06/21  0441 05/05/21  0505 05/04/21  0532   WBC 9.7 9.6 9.1 8.7   RBC 2.93* 2.90* 2.81* 2.87*   HGB 9.0* 9.0* 8.2* 8.4*   HCT 29.3* 29.6* 27.5* 28.2*    102* 98 98   MCH 30.7 31.0 29.2 29.3   MCHC 30.7* 30.4* 29.8* 29.8*   RDW 20.8* 20.5* 20.2* 20.4*    187 191 195     INR  Recent Labs   Lab 05/03/21  0502   INR 0.99       Medications list for reference:  Current Facility-Administered Medications   Medication     acetaminophen (TYLENOL) tablet 975 mg     acyclovir (ZOVIRAX) 5 % ointment     albuterol (PROAIR HFA/PROVENTIL HFA/VENTOLIN HFA) 108 (90 Base) MCG/ACT inhaler 2 puff     amLODIPine (NORVASC) tablet 10 mg     bisacodyl (DULCOLAX) Suppository 10 mg     busPIRone (BUSPAR) tablet 30 mg     carboxymethylcellulose PF (REFRESH PLUS) 0.5 % ophthalmic solution 1 drop     dexamethasone (DECADRON) tablet 4 mg     dextrose 10% infusion     glucose gel 15-30 g    Or     dextrose 50 % injection 25-50 mL    Or     glucagon injection 1 mg     doxepin (SINEquan) 10 MG/ML (HIGH CONC) solution 3 mg     enoxaparin ANTICOAGULANT (LOVENOX) injection 40 mg     FLUoxetine (PROzac) capsule 60 mg     furosemide (LASIX) tablet 40 mg     insulin aspart (NovoLOG) injection (RAPID ACTING)     insulin aspart (NovoLOG) injection (RAPID ACTING)     lactulose (CHRONULAC) solution 3 g     levETIRAcetam (KEPPRA) tablet 250 mg     lidocaine (LMX4) cream     lidocaine 1 % 0.1-1 mL     linaclotide (LINZESS) capsule 290 mcg      LORazepam (ATIVAN) injection 0.5 mg     melatonin tablet 6 mg     mesalamine (CANASA) Suppository 1,000 mg     naloxone (NARCAN) injection 0.2 mg    Or     naloxone (NARCAN) injection 0.4 mg    Or     naloxone (NARCAN) injection 0.2 mg    Or     naloxone (NARCAN) injection 0.4 mg     ondansetron (ZOFRAN) injection 4 mg     oxyCODONE IR (ROXICODONE) half-tab 2.5 mg     oxyCODONE IR (ROXICODONE) half-tab 2.5-5 mg     pantoprazole (PROTONIX) 2 mg/mL suspension 40 mg     polyethylene glycol (MIRALAX) Packet 17 g     prochlorperazine (COMPAZINE) injection 5 mg     QUEtiapine (SEROquel) tablet 25 mg     QUEtiapine (SEROquel) tablet 25 mg     QUEtiapine (SEROquel) tablet 50 mg     sennosides (SENOKOT) tablet 8.6 mg     simethicone (MYLICON) suspension 40 mg     sodium chloride (PF) 0.9% PF flush 10 mL     sodium chloride (PF) 0.9% PF flush 10 mL     sodium chloride (PF) 0.9% PF flush 10 mL     sodium chloride (PF) 0.9% PF flush 10 mL     sulfamethoxazole-trimethoprim (BACTRIM) 400-80 MG per tablet 1 tablet

## 2021-05-07 NOTE — PLAN OF CARE
Neuro: A&Ox4.   Cardiac: SR-ST. VSS.   Respiratory: Sating >91 on RA.  GI/: Adequate urine output via purewick.No Bm  Diet/appetite: Tolerating DD1 diet.  Activity:  Turned and repositioned q2.   Pain: At acceptable level on current regimen.   Skin: No new deficits noted.  LDA's: PICC- Lipids infusing.     Plan: Continue with POC. Notify primary team with changes.

## 2021-05-07 NOTE — PROGRESS NOTES
[facility]    Progress Note - Markianna 3 Service        Date of Admission:  4/26/2021    Assessment & Plan   Olga Bailey is a 75 y.o. F w/ GBM s/p resection (Feb 2021), depression/anxiety, HTN, asthma, and GERD, who was transferred to [facility] for consideration of inpt rad/onc treatment while she continues to be treated for complex presentation of acute hypoxic respiratory failure (due to possible PJP pneumonia and/or TRALI), 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    Today:  - Dexamethasone 4mg Qday  - Radiation therapy today   - Plan for discharge in coming 1-2 days  - Discontinuing TPN, improved PO intake  - Levetiracetam switched to PO from IV     Acute hypoxic respiratory failure   Bilateral Pneumonia; presumed PJP s/p ABx treatment  Concern for transfusion-related acute lung injury (TRALI)  Concern for R hemidiaphragm paralysis  Dyspneic/hypoxic in late March for admission at OSH. Initially treated with ceftri/doxy. ID switched to course of Zosyn/doxy with Bactrim treatment dosing for suspected PJP (had been on steroids after admission for glioblastoma resection; Fungitell positive but Galactomannan negative). Unable to get adequate sputum samples for diagnosis of PJP. Improved with the Bactrim until several days after RP bleed (see below) and was c/f TRALI as well. W/u for PE, COVID, and respiratory viral panel was negative. Bilateral lung opacities present. Concern for ongoing PJP infection (Bactrim was re-started), inflammatory lung condition (increased steroids), volume overload (diuresed for several days with IV Lasix). Imaging shows R hemidiaphragm elevation (concerning for paralysis?), and opacities appear to be improving as are oxygen requirements. Holding off on bronch per pulm recs and pt/family preference given likely need for intubation. Has had several RRTs for tachypnea and tachycardia, appear primarily related to  anxiety. Improve with PRN anxiety medication and Bipap for comfort. Full work-up largely unremarkable. No improvement in O2 needs after diuresis here at [facility].  - Pulmonology consulted, following peripherally    - F/U w/ Pulmonary in 2-3 months with repeat CT imaging,  complete PFTs +/- six minute walk  - Furosemide PO 40mg QAM beginning 5/5 - goal net 0 to -500 ml daily  - pulm, ID previously consulted   - wean steroids: from methylpred IV 62.5 mg BID -> prednisone 60 mg x 2 d (starting 4/28) -> pred 40 mg x 2 d -> pred 30 mg x 1 d -> pred 20 mg x 1 d - discontinued on 5/4   - Will monitor for signs of adrenal insufficiency    - PJP ppx: Bactrim single strength qday x 1.5 mo after steroids end (completed 3 week treatment course at OSH)  - PJP stain, PCR; conventional sputum culture ordered if patient can produce sputum   - f/u blood cultures - NGTD  - wean O2 as able to keep sats > 92%  - Bipap when sleeping and PRN for comfort  - SLP consulted - DD1 and nectar thick liquids     Glioblastoma Multiforme  Left frontoparietal brain glioblastoma status post resection 2/23/2021. Seen by radiation oncology 3/24 recommended XRT and chemo radiation. However subsequently admitted for resp failure. MRI brain here signs of possible disease recurrence. Hem/onc concerned that she might not be a candidate for chemotherapy or radiation currently due to poor performance status.  -Rad/onc following; appreciate recs   -Patient being presented at CNS tumor conference 5/3   -Radiation course inpatient 5/4  -Heme/onc consulted, appreciate recs   - Patient not strong enough for chemo   - would consider temozolomide at late date pending clinical  course (needs to improve performance status)  - Levetiracetam 250mg BID      Major depressive disorder, recurrent  Anxiety  Follows with psychiatry and psychology as outpatint.  Saw health psychology during admission for GBM, made plan for outpatient follow-up and med adjustment,  but was unable.   Given prolonged admission patient could benefit from inpatient consultation again and patient requesting this.  - Psychiatry consulted, appreciate recs  - Palliative recs appreciated;  - Seroquel 25 mg BID + 50 mg at bedtime  - Ativan 0.5 mg q4h PRN - IV per pt request  - Scheduled doxepin PRN at night for sleep  - c/t PTA Buspar  - PTA prozac (dose reduced from 80 to 60 mg)  - Scheduled oxycodone TID + PRN for dyspnea  - Health psychology consult; appreciate recs     Bilateral lower extremity DVT  Right retroperitoneal hematoma   On 3/29 at outside hospital patient's O2 needs increased, duplex ultrasound revealed bilateral lower extremity DVT. CT PE negative for embolism.  Treated with IV heparin and transitioned to Lovenox 70 mg.  On 4/14 this was complicated by retroperitoneal bleed, requiring 4 units of packed red blood cells.  Lovenox was held, and IVC filter was placed on 4/16. Vascular surgery was involved, and a CT abdomen was stable bleed so no surgical intervention was required.  Eventually resumed on prophylactic 40mg of Lovenox twice daily.  -Continue 40 mg Lovenox qday  -hem/onc consulted; appreciate recs   - pain plan:              - Tylenol 975 mg q8h brianna              - oxycodone 2.5-5 mg q4h PRN for pain              - discontinued Dilaudid to avoid delirium  - Asymmetric foot swelling , 4/30/2021. Will not obtain US given there are no changes we would make to the anticoagulation plan; discussed with heme on 4/29/21    Ileus  Began after bleed at outside hospital 4/14; was on TPN for nutrition.  GI was consulted for assistance with bowel management.  With escalating bowel regimen finally had small BM 4/25 after tap water enema, had some liquid stool 4/27. AXR shows non-obstructive bowel gas pattern.  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 Urine sodium 50 while her sodium was 128, certainly consistent with this. Neurologic etiology with her GBM resection likely. Now trending up.   - trend Na; consider fluid restriction if any concern for worsening Na     Shock Liver, improving  LFTs sharply up after RP bleed at OSH, Viola due to shock liver. LFT's have steadily trended down since. Only AST remains elevated and trending down.   -Recheck CMP in am    Steroid-induced hyperglycemia   Intermittently requiring insulin  -Low sliding scale insulin  -Gluc checks     Leukocytosis  Ddx is infection vs steroid-induced.  - trend CBC     Hypertension  Sinus tachycardia  Started on metoprolol at OSH for sinus tachycardia.  - c/t PTA amlodipine 10 mg qday  - hold BB    Chronic medical problems:  - albuterol PRN  - BIPAP at bedtime (on CPAP at home) and for comfort  - GERD: back to PTA PO PPI     Diet: Dysphagia Diet Level 1 Pureed Nectar Thickened Liquids (pre-thickened or use instant food thickener)  Snacks/Supplements Adult: Ensure Enlive; Between Meals  Snacks/Supplements Adult: Magic Cup; With Meals    Fluids: None  Lines: PICC line  DVT Prophylaxis: Enoxaparin (Lovenox) SQ  Martino Catheter: not present  Code Status: Full Code         Disposition Plan   Expected discharge: 1-2 days, recommended to transitional care unit once respiratory status improves, plan for rehab and chemo/radiation in place .  Entered: Marvel Mcdowell MD 05/07/2021, 7:11 AM     The patient's care was discussed with the Attending Physician, Dr. Gordon.    Marvel Mcdowell MD  PGY-1, Internal Medicine  [facility]  ______________________________________________________________________    Interval History    Nursing notes reviewed, no acute events overnight. Breathing comfortably on room air. No dyspnea or changes  in BMs/Urination. Eating well, TPN and lipids turned off.    4 Point ROS obtained and negative except as noted above.     Data reviewed today: I reviewed all medications, new labs and imaging results over the last 24 hours. I personally reviewed     Physical Exam   Vital Signs: Temp: 97.6  F (36.4  C) Temp src: Oral BP: (!) 145/83 Pulse: 88   Resp: 20 SpO2: 91 % O2 Device: None (Room air) Oxygen Delivery: 1 LPM  Weight: 169 lbs 1.49 oz   General Appearance: Alert, awake, sitting upright in bed.  Eyes: no scleral icterus, EOMI  HEENT: neck supple, normocephalic. Dry appearing mucous membranes   Respiratory: No accessory muscle use, mildly course breath sounds in bilateral lung fields anteriorly. Breathing comfortably on room air.  Cardiovascular: Tachycardia, regular rhythm, normal S1/S2, no murmurs appreciated, intact distal pulses  GI: soft, non-tender, non-distended  Skin: scattered bruises over upper exposed upper extremities, no rash noted. 1+ pitting edema to bilateral mid-tibia.  Musculoskeletal: pedal edema in the left foot; no edema of right foot.  Neurologic: Moves all extremities spontaneously, RUE mildly weaker (3/5) compared to LUE, follows commands.   Psychiatric: Pleasant affect, happy.     Data   Recent Labs   Lab 05/07/21  0457 05/06/21  0441 05/05/21  0505 05/03/21  0502 05/03/21  0502 05/02/21  0716   WBC 9.7 9.6 9.1   < > 9.3 10.4   HGB 9.0* 9.0* 8.2*   < > 8.3* 8.6*    102* 98   < > 96 95    187 191   < > 199 200   INR  --   --   --   --  0.99  --     139 141   < > 139 139   POTASSIUM 3.6 4.0 3.7   < > 3.6 3.8   CHLORIDE 104 104 107   < > 103 103   CO2 32 31 31   < > 31 30   BUN 24 33* 38*   < > 35* 32*   CR 0.40* 0.41* 0.28*   < > 0.37* 0.37*   ANIONGAP 3 4 3   < > 5 6   ESTIVEN 8.7 8.7 9.0   < > 8.8 8.5   GLC 85 161* 157*   < > 123* 128*   ALBUMIN 2.6* 2.6* 2.4*   < > 2.4* 2.2*   PROTTOTAL 5.5* 5.5* 5.1*   < > 5.2* 5.2*   BILITOTAL 0.4 0.3 0.3   < > 0.3 0.4   ALKPHOS 105 102 106    < > 111 116   ALT 40 37 40   < > 44 50   AST 26 26 27   < > 25 32   TROPI  --   --   --   --   --  0.035    < > = values in this interval not displayed.     No results found for this or any previous visit (from the past 24 hour(s)).

## 2021-05-07 NOTE — PROGRESS NOTES
[facility]  Palliative Care Sign-Off Note    Palliative Care was consulted for goals of care. At this time goals remain restorative and there are not any active needs we are actively addressing, so we will sign off.    However we know their condition may change -- please re-consult us if we can be helpful at any time in the future.     Thank you for the opportunity to be involved in the care of this patient.    KAYCE Chávez CNP  Palliative Care Consult Team  Pager: [phone]    [facility] Inpatient Team Consult pager [phone] (M-F 8-4:30)  After-hours Answering Service [phone]   Palliative Clinic: [phone]

## 2021-05-07 NOTE — PROGRESS NOTES
"Calorie Count    Intake recorded for: 5/6/2021  Total Kcals: 933 Total Protein: 40g    Kcals from Hospital Food: 933   Protein: 40g    Kcals from Outside Food (average):0 Protein: 0g    # Meals Ordered from Kitchen: 3 meals ordered    # Meals Recorded: 2 recorded (first - 100% brown sugar, pureed peaches, vanilla greek yogurt, oatmeal, 50% nectar thick milk)  (second - 100% cream of potato soup, pureed green beans)    # Supplements Recorded: 100% 1 Ensure Enlive     Calorie counts sheet says \"75% of breakfast --> meal ticket was tossed.\" Unsure what pt ate from breakfast tray. Not enough information to accurately calculate nutritionals.             "

## 2021-05-07 NOTE — PROGRESS NOTES
Care Management Follow Up    Length of Stay (days): 11    Expected Discharge Date: 05/08/21-05/09/21     Concerns to be Addressed: discharge planning     Patient plan of care discussed at interdisciplinary rounds: Yes    Anticipated Discharge Disposition: Transitional Care  Anticipated Discharge Services: None  Anticipated Discharge DME: None    Patient/family educated on Medicare website which has current facility and service quality ratings: no  -Pt/dtr both would like referral to [facility]  Education Provided on the Discharge Plan: yes    Patient/Family in Agreement with the Plan: yes    Referrals Placed by CM/SW: Post Acute Facilities  Private pay costs discussed: Not applicable    Additional Information:  SW continues to follow for safe discharge planning; TCU continues to be recommended at discharge. Pt/dtr both expressed that [facility] is the preference for discharge. Pt will continue to require daily Radiation tx M-F for a total of 15 fractions. [facility] continues to follow for likely admission when ready for discharge.     SW received update from medical team that Pt may be stable for discharge over the weekend. Per update from  TCU Liaison (Jolene) Pt meets TCU criteria. They have requested that when Pt discharges they would like the MD to please address Seroquel plan in discharge summary; they will also require an updated COVID negative test within 72hrs of discharge. CAMRON coordinated with Dr. Mcdowell who confirms he will address Seroquel plan, will plan to order COVID test tomorrow morning, and plans to call family fairly shortly to update them on potential discharge this weekend.    In preparation for discharge CAMRON completed PAS. Confirmation #: [id number]    SW to continue to follow and assist with discharge plan as appropriate.     MACARIO Sorto, Northern Light Maine Coast HospitalSW  6B Intermediate Care Unit   [facility]  Phone: [phone]  Pager: [phone]

## 2021-05-07 NOTE — PROGRESS NOTES
Brief Pulmonary Progress Note - 5/7/2021    Recommendations:  - no indication for increasing steroids  or bronchoscopy given clinical improvement  - continue pulmonary hygiene with incentive spirometry, flutter valve and ambulation  - we will arrange for outpatient follow up with Pulmonary Medicine in two to three months with repeat CT imaging, complete PFTs +/- six minute walk    Pulmonary Medicine will sign off. Please do not hesitate to contact the consult service if questions or concerns arise.    Cole Egan M.D.  Pulmonary and Critical Care Medicine Fellow  5/7/2021

## 2021-05-07 NOTE — PLAN OF CARE
A: A&Ox4, forgetful at times, intermittently lethargic. VS unchanged, on room air required 2L NC while up to chair but able to wean to room air. SR. Afebrile. Denies pain and nausea with scheduled tylenol and oxy. Tolerating DD1 thickened liquid diet, good appetite. Electrolytes WNL, no replacements necessary, insulin given per order set. No new skin deficits noted, pure wick in place, two BM's during shift. Pt up to chair with assist x2 or lift. Able to make needs known via call light. Daughter at bedside, supportive.     I/O this shift:  In: -   Out: 300 [Urine:300]    Temp:  [97.1  F (36.2  C)-98.7  F (37.1  C)] 98.7  F (37.1  C)  Pulse:  [] 120  Resp:  [16-22] 16  BP: (118-145)/(80-86) 122/83  SpO2:  [91 %-98 %] 93 %     R: Continue with POC. Notify primary team with changes.

## 2021-05-07 NOTE — PLAN OF CARE
NEURO: A&O x4, occasionally confused when awaking  VITALS: Blood pressure 133/86, pulse 86, temperature 97.5  F (36.4  C), temperature source Oral, resp. rate 22, weight 76.9 kg (169 lb 8.5 oz), SpO2 98 %.  PAIN: Denies  CARDIAC: SR/ST  RESPIRATORY: RA!!!  GI: Bowel sounds active, BM x2, adequate PO intake  : Adequate UOP  LDA: PICC & purewick  IV: Lipids and TKO line  ACTIVITY: Was up in chair, lifted back to bed, tolerated well  GOALS: Rest and maintain O2

## 2021-05-08 ENCOUNTER — APPOINTMENT (OUTPATIENT)
Dept: PHYSICAL THERAPY | Facility: CLINIC | Age: 76
DRG: 177 | End: 2021-05-08
Attending: STUDENT IN AN ORGANIZED HEALTH CARE EDUCATION/TRAINING PROGRAM
Payer: MEDICARE

## 2021-05-08 ENCOUNTER — APPOINTMENT (OUTPATIENT)
Dept: ULTRASOUND IMAGING | Facility: CLINIC | Age: 76
DRG: 177 | End: 2021-05-08
Attending: STUDENT IN AN ORGANIZED HEALTH CARE EDUCATION/TRAINING PROGRAM
Payer: MEDICARE

## 2021-05-08 ENCOUNTER — APPOINTMENT (OUTPATIENT)
Dept: SPEECH THERAPY | Facility: CLINIC | Age: 76
DRG: 177 | End: 2021-05-08
Attending: STUDENT IN AN ORGANIZED HEALTH CARE EDUCATION/TRAINING PROGRAM
Payer: MEDICARE

## 2021-05-08 LAB
ALBUMIN SERPL-MCNC: 2.9 G/DL (ref 3.4–5)
ALP SERPL-CCNC: 119 U/L (ref 40–150)
ALT SERPL W P-5'-P-CCNC: 43 U/L (ref 0–50)
ANION GAP SERPL CALCULATED.3IONS-SCNC: 4 MMOL/L (ref 3–14)
AST SERPL W P-5'-P-CCNC: 30 U/L (ref 0–45)
BILIRUB SERPL-MCNC: 0.5 MG/DL (ref 0.2–1.3)
BUN SERPL-MCNC: 24 MG/DL (ref 7–30)
CALCIUM SERPL-MCNC: 9.5 MG/DL (ref 8.5–10.1)
CHLORIDE SERPL-SCNC: 106 MMOL/L (ref 94–109)
CO2 SERPL-SCNC: 30 MMOL/L (ref 20–32)
CREAT SERPL-MCNC: 0.41 MG/DL (ref 0.52–1.04)
ERYTHROCYTE [DISTWIDTH] IN BLOOD BY AUTOMATED COUNT: 20.5 % (ref 10–15)
GFR SERPL CREATININE-BSD FRML MDRD: >90 ML/MIN/{1.73_M2}
GLUCOSE BLDC GLUCOMTR-MCNC: 122 MG/DL (ref 70–99)
GLUCOSE BLDC GLUCOMTR-MCNC: 156 MG/DL (ref 70–99)
GLUCOSE BLDC GLUCOMTR-MCNC: 167 MG/DL (ref 70–99)
GLUCOSE SERPL-MCNC: 91 MG/DL (ref 70–99)
HCT VFR BLD AUTO: 33.2 % (ref 35–47)
HGB BLD-MCNC: 10.2 G/DL (ref 11.7–15.7)
INTERPRETATION ECG - MUSE: NORMAL
MAGNESIUM SERPL-MCNC: 2.1 MG/DL (ref 1.6–2.3)
MCH RBC QN AUTO: 30.4 PG (ref 26.5–33)
MCHC RBC AUTO-ENTMCNC: 30.7 G/DL (ref 31.5–36.5)
MCV RBC AUTO: 99 FL (ref 78–100)
PHOSPHATE SERPL-MCNC: 3.4 MG/DL (ref 2.5–4.5)
PLATELET # BLD AUTO: 204 10E9/L (ref 150–450)
POTASSIUM SERPL-SCNC: 3.8 MMOL/L (ref 3.4–5.3)
PROT SERPL-MCNC: 6.3 G/DL (ref 6.8–8.8)
RBC # BLD AUTO: 3.36 10E12/L (ref 3.8–5.2)
SODIUM SERPL-SCNC: 140 MMOL/L (ref 133–144)
WBC # BLD AUTO: 10.3 10E9/L (ref 4–11)

## 2021-05-08 PROCEDURE — 97110 THERAPEUTIC EXERCISES: CPT | Mod: GP

## 2021-05-08 PROCEDURE — 250N000011 HC RX IP 250 OP 636: Performed by: STUDENT IN AN ORGANIZED HEALTH CARE EDUCATION/TRAINING PROGRAM

## 2021-05-08 PROCEDURE — 85027 COMPLETE CBC AUTOMATED: CPT | Performed by: STUDENT IN AN ORGANIZED HEALTH CARE EDUCATION/TRAINING PROGRAM

## 2021-05-08 PROCEDURE — 82962 GLUCOSE BLOOD TEST: CPT

## 2021-05-08 PROCEDURE — 97116 GAIT TRAINING THERAPY: CPT | Mod: GP

## 2021-05-08 PROCEDURE — 250N000013 HC RX MED GY IP 250 OP 250 PS 637: Performed by: STUDENT IN AN ORGANIZED HEALTH CARE EDUCATION/TRAINING PROGRAM

## 2021-05-08 PROCEDURE — 97530 THERAPEUTIC ACTIVITIES: CPT | Mod: GP

## 2021-05-08 PROCEDURE — 120N000003 HC R&B IMCU UMMC

## 2021-05-08 PROCEDURE — 250N000013 HC RX MED GY IP 250 OP 250 PS 637: Performed by: INTERNAL MEDICINE

## 2021-05-08 PROCEDURE — 93971 EXTREMITY STUDY: CPT | Mod: LT

## 2021-05-08 PROCEDURE — 84100 ASSAY OF PHOSPHORUS: CPT | Performed by: STUDENT IN AN ORGANIZED HEALTH CARE EDUCATION/TRAINING PROGRAM

## 2021-05-08 PROCEDURE — 999N000044 HC STATISTIC CVC DRESSING CHANGE

## 2021-05-08 PROCEDURE — 80053 COMPREHEN METABOLIC PANEL: CPT | Performed by: STUDENT IN AN ORGANIZED HEALTH CARE EDUCATION/TRAINING PROGRAM

## 2021-05-08 PROCEDURE — 250N000012 HC RX MED GY IP 250 OP 636 PS 637: Performed by: STUDENT IN AN ORGANIZED HEALTH CARE EDUCATION/TRAINING PROGRAM

## 2021-05-08 PROCEDURE — 83735 ASSAY OF MAGNESIUM: CPT | Performed by: STUDENT IN AN ORGANIZED HEALTH CARE EDUCATION/TRAINING PROGRAM

## 2021-05-08 PROCEDURE — 36592 COLLECT BLOOD FROM PICC: CPT | Performed by: STUDENT IN AN ORGANIZED HEALTH CARE EDUCATION/TRAINING PROGRAM

## 2021-05-08 PROCEDURE — 99231 SBSQ HOSP IP/OBS SF/LOW 25: CPT | Performed by: INTERNAL MEDICINE

## 2021-05-08 PROCEDURE — 93971 EXTREMITY STUDY: CPT | Mod: 26 | Performed by: RADIOLOGY

## 2021-05-08 PROCEDURE — 92526 ORAL FUNCTION THERAPY: CPT | Mod: GN

## 2021-05-08 RX ADMIN — Medication 2.5 MG: at 08:49

## 2021-05-08 RX ADMIN — DEXAMETHASONE 4 MG: 4 TABLET ORAL at 08:44

## 2021-05-08 RX ADMIN — PANTOPRAZOLE SODIUM 40 MG: 40 TABLET, DELAYED RELEASE ORAL at 08:45

## 2021-05-08 RX ADMIN — MESALAMINE 1000 MG: 1000 SUPPOSITORY RECTAL at 21:54

## 2021-05-08 RX ADMIN — SULFAMETHOXAZOLE AND TRIMETHOPRIM 1 TABLET: 400; 80 TABLET ORAL at 08:46

## 2021-05-08 RX ADMIN — ACYCLOVIR: 50 OINTMENT TOPICAL at 21:55

## 2021-05-08 RX ADMIN — LACTULOSE 3 G: 20 SOLUTION ORAL at 17:55

## 2021-05-08 RX ADMIN — BUSPIRONE HYDROCHLORIDE 30 MG: 15 TABLET ORAL at 19:30

## 2021-05-08 RX ADMIN — QUETIAPINE 50 MG: 50 TABLET ORAL at 21:54

## 2021-05-08 RX ADMIN — ACYCLOVIR: 50 OINTMENT TOPICAL at 08:42

## 2021-05-08 RX ADMIN — LINACLOTIDE 290 MCG: 145 CAPSULE, GELATIN COATED ORAL at 08:42

## 2021-05-08 RX ADMIN — DOXEPIN HYDROCHLORIDE 3 MG: 10 SOLUTION ORAL at 21:54

## 2021-05-08 RX ADMIN — POLYETHYLENE GLYCOL 3350 17 G: 17 POWDER, FOR SOLUTION ORAL at 08:42

## 2021-05-08 RX ADMIN — BUSPIRONE HYDROCHLORIDE 30 MG: 15 TABLET ORAL at 08:42

## 2021-05-08 RX ADMIN — ACETAMINOPHEN 975 MG: 325 TABLET, FILM COATED ORAL at 19:30

## 2021-05-08 RX ADMIN — ACETAMINOPHEN 975 MG: 325 TABLET, FILM COATED ORAL at 08:43

## 2021-05-08 RX ADMIN — FLUOXETINE HYDROCHLORIDE 60 MG: 40 CAPSULE ORAL at 08:43

## 2021-05-08 RX ADMIN — ACETAMINOPHEN 975 MG: 325 TABLET, FILM COATED ORAL at 14:07

## 2021-05-08 RX ADMIN — QUETIAPINE 25 MG: 25 TABLET ORAL at 17:55

## 2021-05-08 RX ADMIN — Medication 2.5 MG: at 19:30

## 2021-05-08 RX ADMIN — QUETIAPINE 25 MG: 25 TABLET ORAL at 08:44

## 2021-05-08 RX ADMIN — INSULIN ASPART 1 UNITS: 100 INJECTION, SOLUTION INTRAVENOUS; SUBCUTANEOUS at 16:23

## 2021-05-08 RX ADMIN — LORAZEPAM 0.5 MG: 2 INJECTION INTRAMUSCULAR; INTRAVENOUS at 22:36

## 2021-05-08 RX ADMIN — LEVETIRACETAM 250 MG: 250 TABLET, FILM COATED ORAL at 08:43

## 2021-05-08 RX ADMIN — ENOXAPARIN SODIUM 40 MG: 100 INJECTION SUBCUTANEOUS at 16:19

## 2021-05-08 RX ADMIN — AMLODIPINE BESYLATE 10 MG: 10 TABLET ORAL at 08:45

## 2021-05-08 RX ADMIN — PANTOPRAZOLE SODIUM 40 MG: 40 TABLET, DELAYED RELEASE ORAL at 16:19

## 2021-05-08 RX ADMIN — LEVETIRACETAM 250 MG: 250 TABLET, FILM COATED ORAL at 19:30

## 2021-05-08 RX ADMIN — FUROSEMIDE 40 MG: 40 TABLET ORAL at 08:43

## 2021-05-08 RX ADMIN — Medication 2.5 MG: at 14:08

## 2021-05-08 RX ADMIN — LACTULOSE 3 G: 20 SOLUTION ORAL at 08:45

## 2021-05-08 ASSESSMENT — ACTIVITIES OF DAILY LIVING (ADL)
ADLS_ACUITY_SCORE: 19

## 2021-05-08 NOTE — PLAN OF CARE
A: A&Ox4, forgetful at times, mood improved, less anxiety today. VS unchanged, on room air required 2L NC while up to chair but able to wean to room air. SR- Sinus Tach. Afebrile. Denies pain and nausea with scheduled tylenol and oxy. Tolerating DD1 thickened liquid diet, good appetite. Electrolytes WNL, no replacements necessary, insulin given per order set. No new skin deficits noted, pure wick in place, continent BM's during shift. Pt up to chair with assist x2 or lift. Able to make needs known via call light. Daughter at bedside, supportive. Had US done of LE.     I/O this shift:  In: -   Out: 1000 [Urine:1000]    Temp:  [97  F (36.1  C)-98  F (36.7  C)] 98  F (36.7  C)  Pulse:  [] 113  Resp:  [16-18] 18  BP: (114-147)/(69-82) 114/78  SpO2:  [86 %-98 %] 98 %     R: Continue with POC. Notify primary team with changes.

## 2021-05-08 NOTE — PLAN OF CARE
Neuro: A&Ox4. Forgetful at times.   Cardiac: SR/ST, see prev note. Afebrile.    Respiratory: Sating >92% on 2L at night.  GI/: Adequate urine output, purwick in place. BM X1  Diet/appetite: Tolerating DD1 diet. Eating well.  Activity:  Lift, up to chair and in halls.  Pain: Denies. At acceptable level on current regimen.   Skin: No new deficits noted.  LDA's: TL PICC     Plan: Continue with POC. Notify primary team with changes.

## 2021-05-08 NOTE — PROGRESS NOTES
[facility]    Progress Note - Maroon 3 Service        Date of Admission:  4/26/2021    Assessment & Plan   Olga Bailey is a 75 y.o. F w/ GBM s/p resection (Feb 2021), depression/anxiety, HTN, asthma, and GERD, who was transferred to [facility] for consideration of inpt rad/onc treatment while she continues to be treated for complex presentation of acute hypoxic respiratory failure (due to possible PJP pneumonia and/or TRALI), 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    Today:  - Dexamethasone 4mg Qday  - Plan for discharge in coming 1-2 days to TCU at   - SVT run for 5 mins overnight.  - LLE U/S due to swelling     #Acute hypoxic respiratory failure   #Bilateral Pneumonia; presumed PJP s/p ABx treatment  #Concern for transfusion-related acute lung injury (TRALI)  #Concern for R hemidiaphragm paralysis  Dyspneic/hypoxic in late March for admission at OSH. Initially treated with ceftri/doxy. ID switched to course of Zosyn/doxy with Bactrim treatment dosing for suspected PJP (had been on steroids after admission for glioblastoma resection; Fungitell positive but Galactomannan negative). Unable to get adequate sputum samples for diagnosis of PJP. Improved with the Bactrim until several days after RP bleed (see below) and was c/f TRALI as well. W/u for PE, COVID, and respiratory viral panel was negative. Bilateral lung opacities present. Concern for ongoing PJP infection (Bactrim was re-started), inflammatory lung condition (increased steroids), volume overload (diuresed for several days with IV Lasix). Imaging shows R hemidiaphragm elevation (concerning for paralysis?), and opacities appear to be improving as are oxygen requirements. Holding off on bronch per pulm recs and pt/family preference given likely need for intubation. Has had several RRTs for tachypnea and tachycardia, appear primarily related to anxiety. Improve with PRN anxiety  medication and Bipap for comfort. Full work-up largely unremarkable. No improvement in O2 needs after diuresis here at [facility].  - Pulmonology consulted, following peripherally    - F/U w/ Pulmonary in 2-3 months with repeat CT imaging,  complete PFTs +/- six minute walk  - Furosemide PO 40mg QAM beginning 5/5 - goal net 0 to -500 ml daily  - pulm, ID previously consulted   - Steroids: from methylpred -> prednisone, discontinued 5/4   - PJP ppx: Bactrim single strength qday x 1.5 mo after steroids end (completed 3 week treatment course at OSH)  - PJP stain, PCR; conventional sputum culture ordered if patient can produce sputum   - f/u blood cultures - NGTD  - wean O2 as able to keep sats > 92%  - Bipap when sleeping and PRN for comfort  - SLP consulted - DD1 and nectar thick liquids     Glioblastoma Multiforme  Left frontoparietal brain glioblastoma status post resection 2/23/2021. Seen by radiation oncology 3/24 recommended XRT and chemo radiation. However subsequently admitted for resp failure. MRI brain here signs of possible disease recurrence. Hem/onc concerned that she might not be a candidate for chemotherapy or radiation currently due to poor performance status.  -Rad/onc following; appreciate recs   -Patient being presented at CNS tumor conference 5/3   -Radiation course inpatient 5/4  -Heme/onc consulted, appreciate recs   - Patient not strong enough for chemo   - would consider temozolomide at late date pending clinical  course (needs to improve performance status)  - Levetiracetam 250mg BID      #Major depressive disorder, recurrent  #Anxiety  Follows with psychiatry and psychology as outpatint.  Saw health psychology during admission for GBM, made plan for outpatient follow-up and med adjustment,  but was unable.  Given prolonged admission patient could benefit from inpatient consultation again and patient requesting this.  - Psychiatry consulted, appreciate recs  - Palliative recs appreciated;  - Seroquel  25 mg BID + 50 mg at bedtime  - Ativan 0.5 mg q4h PRN - IV per pt request  - Scheduled doxepin PRN at night for sleep  - c/t PTA Buspar  - PTA prozac (dose reduced from 80 to 60 mg)  - Scheduled oxycodone TID + PRN for dyspnea  - Health psychology consult; appreciate recs     #Bilateral lower extremity DVT  #Right retroperitoneal hematoma   On 3/29 at outside hospital patient's O2 needs increased, duplex ultrasound revealed bilateral lower extremity DVT. CT PE negative for embolism.  Treated with IV heparin and transitioned to Lovenox 70 mg.  On 4/14 this was complicated by retroperitoneal bleed, requiring 4 units of packed red blood cells.  Lovenox was held, and IVC filter was placed on 4/16. Vascular surgery was involved, and a CT abdomen was stable bleed so no surgical intervention was required.  Eventually resumed on prophylactic 40mg of Lovenox twice daily.  -Continue 40 mg Lovenox qday  -hem/onc consulted; appreciate recs   - pain plan:              - Tylenol 975 mg q8h brianna              - oxycodone 2.5-5 mg q4h PRN for pain              - discontinued Dilaudid to avoid delirium  - Asymmetric foot swelling , 4/30/2021. Will not obtain US given there are no changes we would make to the anticoagulation plan; discussed with heme on 4/29/21    #Ileus, Improved  Began after bleed at outside hospital 4/14; was on TPN for nutrition.  GI was consulted for assistance with bowel management.  With escalating bowel regimen finally had small BM 4/25 after tap water enema, had some liquid stool 4/27. AXR shows non-obstructive bowel gas pattern.  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 Urine sodium 50 while her  sodium was 128, certainly consistent with this. Neurologic etiology with her GBM resection likely. Now trending up.   - trend Na; consider fluid restriction if any concern for worsening Na     #Shock Liver, improving  LFTs sharply up after RP bleed at OSH, [place] due to shock liver. LFT's have steadily trended down since. Only AST remains elevated and trending down.   -Recheck CMP in am    #Steroid-induced hyperglycemia   Intermittently requiring insulin  -Low sliding scale insulin  -Gluc checks     #Leukocytosis  Ddx is infection vs steroid-induced.  - trend CBC     #Hypertension  #Sinus tachycardia  Started on metoprolol at OSH for sinus tachycardia.  - c/t PTA amlodipine 10 mg qday  - hold BB    #Chronic medical problems:  - albuterol PRN  - BIPAP at bedtime (on CPAP at home) and for comfort  - GERD: back to PTA PO PPI     Diet: Dysphagia Diet Level 1 Pureed Nectar Thickened Liquids (pre-thickened or use instant food thickener)  Snacks/Supplements Adult: Ensure Enlive; Between Meals  Snacks/Supplements Adult: Magic Cup; With Meals    Fluids: None  Lines: PICC line  DVT Prophylaxis: Enoxaparin (Lovenox) SQ  Martino Catheter: not present  Code Status: Full Code         Disposition Plan   Expected discharge: 1-2 days, recommended to transitional care unit once respiratory status improves, plan for rehab and chemo/radiation in place .  Entered: Marvel Mcdowell MD 05/08/2021, 7:15 AM     The patient's care was discussed with the Attending Physician, Dr. Gordon.    Marvel Mcdowell MD  PGY-1, Internal Medicine  [facility]  ______________________________________________________________________    Interval History    Nursing notes reviewed. No acute changes overnight. Patient and daughter expressed concern about TCU at  given history of treatment. Discussed that team will thoroughly plan to prevent errors. Patient breathing well, required 2 LPM overnight.    4 Point ROS obtained and negative except as  noted above.     Data reviewed today: I reviewed all medications, new labs and imaging results over the last 24 hours. I personally reviewed     Physical Exam   Vital Signs: Temp: 97.7  F (36.5  C) Temp src: Oral BP: (!) 147/82 Pulse: 86   Resp: 16 SpO2: 95 % O2 Device: Nasal cannula Oxygen Delivery: 2 LPM  Weight: 169 lbs 1.49 oz   General Appearance: Alert, awake, sitting upright in bed.  Eyes: no scleral icterus, EOMI  HEENT: neck supple, normocephalic. Dry appearing mucous membranes   Respiratory: No accessory muscle use, mildly course breath sounds in bilateral lung fields anteriorly. Breathing comfortably on room air.  Cardiovascular: regular rate rhythm, normal S1/S2, no murmurs appreciated, intact distal pulses  GI: soft, non-tender, non-distended  Skin: scattered bruises over upper exposed upper extremities, no rash noted. 1+ pitting edema to bilateral mid-tibia.  Musculoskeletal: pedal edema in the left foot; no edema of right foot.  Neurologic: Moves all extremities spontaneously, RUE mildly weaker (3/5) compared to LUE, follows commands.   Psychiatric: Pleasant affect, happy.     Data   Recent Labs   Lab 05/08/21  0529 05/07/21  2149 05/07/21  0457 05/06/21  0441 05/03/21  0502 05/03/21  0502 05/02/21  0716   WBC 10.3  --  9.7 9.6   < > 9.3 10.4   HGB 10.2*  --  9.0* 9.0*   < > 8.3* 8.6*   MCV 99  --  100 102*   < > 96 95     --  196 187   < > 199 200   INR  --   --   --   --   --  0.99  --     140 139 139   < > 139 139   POTASSIUM 3.8 3.8 3.6 4.0   < > 3.6 3.8   CHLORIDE 106 105 104 104   < > 103 103   CO2 30 31 32 31   < > 31 30   BUN 24 27 24 33*   < > 35* 32*   CR 0.41* 0.51* 0.40* 0.41*   < > 0.37* 0.37*   ANIONGAP 4 4 3 4   < > 5 6   ESTIVEN 9.5 9.2 8.7 8.7   < > 8.8 8.5   GLC 91 119* 85 161*   < > 123* 128*   ALBUMIN 2.9*  --  2.6* 2.6*   < > 2.4* 2.2*   PROTTOTAL 6.3*  --  5.5* 5.5*   < > 5.2* 5.2*   BILITOTAL 0.5  --  0.4 0.3   < > 0.3 0.4   ALKPHOS 119  --  105 102   < > 111 116   ALT  43  --  40 37   < > 44 50   AST 30  --  26 26   < > 25 32   TROPI  --   --   --   --   --   --  0.035    < > = values in this interval not displayed.     No results found for this or any previous visit (from the past 24 hour(s)).

## 2021-05-08 NOTE — PROGRESS NOTES
Care Management Follow Up    Length of Stay (days): 12    Expected Discharge Date: 05/08/21     Concerns to be Addressed: discharge planning     Patient plan of care discussed at interdisciplinary rounds: No    Anticipated Discharge Disposition: Transitional Care     Anticipated Discharge Services: None  Anticipated Discharge DME: None    Patient/family educated on Medicare website which has current facility and service quality ratings: No  -Pt/dtr both would like referral to [facility]  Education Provided on the Discharge Plan: Yes  Patient/Family in Agreement with the Plan: Yes    Referrals Placed by CM/SW: Post Acute Facilities  Private pay costs discussed: Not applicable    Additional Information:  SW continues to follow for safe discharge planning, SW communicated with Med team and Pt will be ready for discharge Sunday 5/9/21. [facility] continues to be recommended at discharge. Pt/dtr both expressed that [facility] is the preference for discharge. Pt will continue to require daily Radiation tx M-F for a total of 15 fractions. Pt is accepted at [facility]; Pt is  ready for discharge.   Pending covid testing; Test need to be done. Pt med team have been notify regarding a new covid test swap.  Weekend( Sunday) SW to continue to follow and assist with discharge plan as appropriate.     MACARIO Martins, KRIS  Weekend Adult Acute Care      For weekend social work needs, contact information below and available on [facility]:  4A, 4C, 4E, 5A, 5B                  pager [phone]  6A, 6B, 6C                               pager [phone]  7A, 7B, 7C, 7D, 5C                 pager [phone]  ED/OBS                                  pager [phone]     For weekend RN care coordinator needs (home discharge with needs including home care, rides home, assisted living facility returns, durable medical equip, IV antibiotics) - page [phone].     For hours 1600 - midnight Pager: [phone]

## 2021-05-08 NOTE — PROVIDER NOTIFICATION
Time of notification: 9:13 PM  Provider notified: Maximilian disla   Patient status: Pt had a 4 minute run of SVT HR sustaining in the 140's  Temp:  [97.1  F (36.2  C)-98.7  F (37.1  C)] 98  F (36.7  C)  Pulse:  [] 147  Resp:  [16-20] 16  BP: (118-145)/(78-83) 120/78  SpO2:  [91 %-93 %] 93 %  Orders received:  EKG, labs and potassium ordered

## 2021-05-09 ENCOUNTER — APPOINTMENT (OUTPATIENT)
Dept: SPEECH THERAPY | Facility: CLINIC | Age: 76
DRG: 177 | End: 2021-05-09
Attending: STUDENT IN AN ORGANIZED HEALTH CARE EDUCATION/TRAINING PROGRAM
Payer: MEDICARE

## 2021-05-09 LAB
ALBUMIN SERPL-MCNC: 2.7 G/DL (ref 3.4–5)
ALP SERPL-CCNC: 97 U/L (ref 40–150)
ALT SERPL W P-5'-P-CCNC: 42 U/L (ref 0–50)
ANION GAP SERPL CALCULATED.3IONS-SCNC: 5 MMOL/L (ref 3–14)
AST SERPL W P-5'-P-CCNC: 27 U/L (ref 0–45)
BILIRUB SERPL-MCNC: 0.4 MG/DL (ref 0.2–1.3)
BUN SERPL-MCNC: 28 MG/DL (ref 7–30)
CALCIUM SERPL-MCNC: 8.9 MG/DL (ref 8.5–10.1)
CHLORIDE SERPL-SCNC: 107 MMOL/L (ref 94–109)
CO2 SERPL-SCNC: 28 MMOL/L (ref 20–32)
CREAT SERPL-MCNC: 0.5 MG/DL (ref 0.52–1.04)
ERYTHROCYTE [DISTWIDTH] IN BLOOD BY AUTOMATED COUNT: 21 % (ref 10–15)
GFR SERPL CREATININE-BSD FRML MDRD: >90 ML/MIN/{1.73_M2}
GLUCOSE BLDC GLUCOMTR-MCNC: 130 MG/DL (ref 70–99)
GLUCOSE BLDC GLUCOMTR-MCNC: 170 MG/DL (ref 70–99)
GLUCOSE BLDC GLUCOMTR-MCNC: 193 MG/DL (ref 70–99)
GLUCOSE BLDC GLUCOMTR-MCNC: 89 MG/DL (ref 70–99)
GLUCOSE BLDC GLUCOMTR-MCNC: 92 MG/DL (ref 70–99)
GLUCOSE SERPL-MCNC: 84 MG/DL (ref 70–99)
HCT VFR BLD AUTO: 30.9 % (ref 35–47)
HGB BLD-MCNC: 9.6 G/DL (ref 11.7–15.7)
MAGNESIUM SERPL-MCNC: 2.1 MG/DL (ref 1.6–2.3)
MCH RBC QN AUTO: 30.7 PG (ref 26.5–33)
MCHC RBC AUTO-ENTMCNC: 31.1 G/DL (ref 31.5–36.5)
MCV RBC AUTO: 99 FL (ref 78–100)
PHOSPHATE SERPL-MCNC: 3.9 MG/DL (ref 2.5–4.5)
PLATELET # BLD AUTO: 207 10E9/L (ref 150–450)
POTASSIUM SERPL-SCNC: 3.5 MMOL/L (ref 3.4–5.3)
PROT SERPL-MCNC: 5.5 G/DL (ref 6.8–8.8)
RBC # BLD AUTO: 3.13 10E12/L (ref 3.8–5.2)
SODIUM SERPL-SCNC: 141 MMOL/L (ref 133–144)
WBC # BLD AUTO: 9.6 10E9/L (ref 4–11)

## 2021-05-09 PROCEDURE — 250N000013 HC RX MED GY IP 250 OP 250 PS 637: Performed by: STUDENT IN AN ORGANIZED HEALTH CARE EDUCATION/TRAINING PROGRAM

## 2021-05-09 PROCEDURE — 82962 GLUCOSE BLOOD TEST: CPT

## 2021-05-09 PROCEDURE — 36415 COLL VENOUS BLD VENIPUNCTURE: CPT | Performed by: STUDENT IN AN ORGANIZED HEALTH CARE EDUCATION/TRAINING PROGRAM

## 2021-05-09 PROCEDURE — 250N000011 HC RX IP 250 OP 636: Performed by: STUDENT IN AN ORGANIZED HEALTH CARE EDUCATION/TRAINING PROGRAM

## 2021-05-09 PROCEDURE — 250N000013 HC RX MED GY IP 250 OP 250 PS 637: Performed by: INTERNAL MEDICINE

## 2021-05-09 PROCEDURE — 92526 ORAL FUNCTION THERAPY: CPT | Mod: GN

## 2021-05-09 PROCEDURE — 80053 COMPREHEN METABOLIC PANEL: CPT | Performed by: STUDENT IN AN ORGANIZED HEALTH CARE EDUCATION/TRAINING PROGRAM

## 2021-05-09 PROCEDURE — 120N000003 HC R&B IMCU UMMC

## 2021-05-09 PROCEDURE — 84100 ASSAY OF PHOSPHORUS: CPT | Performed by: STUDENT IN AN ORGANIZED HEALTH CARE EDUCATION/TRAINING PROGRAM

## 2021-05-09 PROCEDURE — 85027 COMPLETE CBC AUTOMATED: CPT | Performed by: STUDENT IN AN ORGANIZED HEALTH CARE EDUCATION/TRAINING PROGRAM

## 2021-05-09 PROCEDURE — 99232 SBSQ HOSP IP/OBS MODERATE 35: CPT | Mod: GC | Performed by: INTERNAL MEDICINE

## 2021-05-09 PROCEDURE — 250N000012 HC RX MED GY IP 250 OP 636 PS 637: Performed by: STUDENT IN AN ORGANIZED HEALTH CARE EDUCATION/TRAINING PROGRAM

## 2021-05-09 PROCEDURE — 83735 ASSAY OF MAGNESIUM: CPT | Performed by: STUDENT IN AN ORGANIZED HEALTH CARE EDUCATION/TRAINING PROGRAM

## 2021-05-09 RX ORDER — PANTOPRAZOLE SODIUM 40 MG/1
40 TABLET, DELAYED RELEASE ORAL
Status: DISCONTINUED | OUTPATIENT
Start: 2021-05-09 | End: 2021-05-15 | Stop reason: HOSPADM

## 2021-05-09 RX ADMIN — LORAZEPAM 0.5 MG: 2 INJECTION INTRAMUSCULAR; INTRAVENOUS at 16:13

## 2021-05-09 RX ADMIN — QUETIAPINE 50 MG: 50 TABLET ORAL at 21:37

## 2021-05-09 RX ADMIN — ACYCLOVIR: 50 OINTMENT TOPICAL at 16:19

## 2021-05-09 RX ADMIN — ACYCLOVIR: 50 OINTMENT TOPICAL at 20:30

## 2021-05-09 RX ADMIN — FUROSEMIDE 40 MG: 40 TABLET ORAL at 08:21

## 2021-05-09 RX ADMIN — SULFAMETHOXAZOLE AND TRIMETHOPRIM 1 TABLET: 400; 80 TABLET ORAL at 08:21

## 2021-05-09 RX ADMIN — Medication 2.5 MG: at 20:28

## 2021-05-09 RX ADMIN — ENOXAPARIN SODIUM 40 MG: 100 INJECTION SUBCUTANEOUS at 16:13

## 2021-05-09 RX ADMIN — PANTOPRAZOLE SODIUM 40 MG: 40 TABLET, DELAYED RELEASE ORAL at 08:20

## 2021-05-09 RX ADMIN — Medication 2.5 MG: at 15:20

## 2021-05-09 RX ADMIN — LACTULOSE 3 G: 20 SOLUTION ORAL at 08:20

## 2021-05-09 RX ADMIN — MESALAMINE 1000 MG: 1000 SUPPOSITORY RECTAL at 21:37

## 2021-05-09 RX ADMIN — PANTOPRAZOLE SODIUM 40 MG: 40 TABLET, DELAYED RELEASE ORAL at 16:13

## 2021-05-09 RX ADMIN — BUSPIRONE HYDROCHLORIDE 30 MG: 15 TABLET ORAL at 08:20

## 2021-05-09 RX ADMIN — ACYCLOVIR: 50 OINTMENT TOPICAL at 08:20

## 2021-05-09 RX ADMIN — LEVETIRACETAM 250 MG: 250 TABLET, FILM COATED ORAL at 20:28

## 2021-05-09 RX ADMIN — DOXEPIN HYDROCHLORIDE 3 MG: 10 SOLUTION ORAL at 21:37

## 2021-05-09 RX ADMIN — ACETAMINOPHEN 975 MG: 325 TABLET, FILM COATED ORAL at 08:20

## 2021-05-09 RX ADMIN — Medication 2.5 MG: at 08:19

## 2021-05-09 RX ADMIN — INSULIN ASPART 1 UNITS: 100 INJECTION, SOLUTION INTRAVENOUS; SUBCUTANEOUS at 16:24

## 2021-05-09 RX ADMIN — QUETIAPINE 25 MG: 25 TABLET ORAL at 18:48

## 2021-05-09 RX ADMIN — FLUOXETINE HYDROCHLORIDE 60 MG: 40 CAPSULE ORAL at 08:20

## 2021-05-09 RX ADMIN — DEXAMETHASONE 4 MG: 4 TABLET ORAL at 08:21

## 2021-05-09 RX ADMIN — QUETIAPINE 25 MG: 25 TABLET ORAL at 08:20

## 2021-05-09 RX ADMIN — LORAZEPAM 0.5 MG: 2 INJECTION INTRAMUSCULAR; INTRAVENOUS at 22:43

## 2021-05-09 RX ADMIN — ACETAMINOPHEN 975 MG: 325 TABLET, FILM COATED ORAL at 15:21

## 2021-05-09 RX ADMIN — LINACLOTIDE 290 MCG: 145 CAPSULE, GELATIN COATED ORAL at 08:20

## 2021-05-09 RX ADMIN — LEVETIRACETAM 250 MG: 250 TABLET, FILM COATED ORAL at 08:21

## 2021-05-09 RX ADMIN — ACETAMINOPHEN 975 MG: 325 TABLET, FILM COATED ORAL at 20:27

## 2021-05-09 RX ADMIN — AMLODIPINE BESYLATE 10 MG: 10 TABLET ORAL at 08:21

## 2021-05-09 RX ADMIN — POLYETHYLENE GLYCOL 3350 17 G: 17 POWDER, FOR SOLUTION ORAL at 08:21

## 2021-05-09 RX ADMIN — BUSPIRONE HYDROCHLORIDE 30 MG: 15 TABLET ORAL at 20:28

## 2021-05-09 ASSESSMENT — ACTIVITIES OF DAILY LIVING (ADL)
ADLS_ACUITY_SCORE: 19

## 2021-05-09 NOTE — PLAN OF CARE
A: A&Ox4, forgetful at times, mood improved, episode of anxiety after visit with SW & daughter, but pt utilized good coping mechanisms and 1x dose of prn ativan given. VS unchanged, on room air. SR- Sinus Tach. Afebrile. Denies pain and nausea with scheduled tylenol and oxy. Tolerating DD1 thickened liquid diet, good appetite. Electrolytes WNL, no replacements necessary, insulin given per order set. No new skin deficits noted, pure wick in place, continent BM's during shift. Pt up to chair with assist x2 or lift. Able to make needs known via call light. Daughter at bedside, supportive.     I/O this shift:  In: 240 [P.O.:240]  Out: -     Temp:  [97.6  F (36.4  C)-98.3  F (36.8  C)] 97.9  F (36.6  C)  Pulse:  [] 98  Resp:  [16-18] 16  BP: (115-137)/(72-84) 124/80  SpO2:  [94 %-96 %] 95 %     R: Continue with POC. Notify primary team with changes.

## 2021-05-09 NOTE — PROGRESS NOTES
[facility]    Progress Note - [place] 3 Service        Date of Admission:  4/26/2021    Assessment & Plan   Olga Bailey is a 75 y.o. F w/ GBM s/p resection (Feb 2021), depression/anxiety, HTN, asthma, and GERD, who was transferred to [facility] for consideration of inpt rad/onc treatment while she continues to be treated for complex presentation of acute hypoxic respiratory failure (due to possible PJP pneumonia and/or TRALI), 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    Today:  - Dexamethasone 4mg Qday  - Discontinued lactulose      #Acute hypoxic respiratory failure   #Bilateral Pneumonia; presumed PJP s/p ABx treatment  #Concern for transfusion-related acute lung injury (TRALI)  #Concern for R hemidiaphragm paralysis  Dyspneic/hypoxic in late March for admission at OSH. Initially treated with ceftri/doxy. ID switched to course of Zosyn/doxy with Bactrim treatment dosing for suspected PJP (had been on steroids after admission for glioblastoma resection; Fungitell positive but Galactomannan negative). Unable to get adequate sputum samples for diagnosis of PJP. Improved with the Bactrim until several days after RP bleed (see below) and was c/f TRALI as well. W/u for PE, COVID, and respiratory viral panel was negative. Bilateral lung opacities present. Concern for ongoing PJP infection (Bactrim was re-started), inflammatory lung condition (increased steroids), volume overload (diuresed for several days with IV Lasix). Imaging shows R hemidiaphragm elevation (concerning for paralysis?), and opacities appear to be improving as are oxygen requirements. Holding off on bronch per pulm recs and pt/family preference given likely need for intubation. Has had several RRTs for tachypnea and tachycardia, appear primarily related to anxiety. Improve with PRN anxiety medication and Bipap for comfort. Full work-up largely unremarkable. No improvement in  O2 needs after diuresis here at [facility].  - Pulmonology consulted, following peripherally    - F/U w/ Pulmonary in 2-3 months with repeat CT imaging,  complete PFTs +/- six minute walk  - Furosemide PO 40mg QAM beginning 5/5 - goal net 0 to -500 ml daily  - pulm, ID previously consulted   - Steroids: from methylpred -> prednisone, discontinued 5/4   - PJP ppx: Bactrim single strength qday x 1.5 mo after steroids end (completed 3 week treatment course at OSH)  - PJP stain, PCR; conventional sputum culture ordered if patient can produce sputum   - f/u blood cultures - NGTD  - wean O2 as able to keep sats > 92%  - Bipap when sleeping and PRN for comfort  - SLP consulted - DD1 and nectar thick liquids     Glioblastoma Multiforme  Left frontoparietal brain glioblastoma status post resection 2/23/2021. Seen by radiation oncology 3/24 recommended XRT and chemo radiation. However subsequently admitted for resp failure. MRI brain here signs of possible disease recurrence. Hem/onc concerned that she might not be a candidate for chemotherapy or radiation currently due to poor performance status.  -Rad/onc following; appreciate recs   -Patient being presented at CNS tumor conference 5/3   -Radiation course inpatient 5/4  -Heme/onc consulted, appreciate recs   - Patient not strong enough for chemo   - would consider temozolomide at late date pending clinical course (needs to improve performance status)  - Levetiracetam 250mg BID      #Major depressive disorder, recurrent  #Anxiety  Follows with psychiatry and psychology as outpatint.  Saw health psychology during admission for GBM, made plan for outpatient follow-up and med adjustment,  but was unable.  Given prolonged admission patient could benefit from inpatient consultation again and patient requesting this.  - Psychiatry consulted, appreciate recs  - Palliative recs appreciated;  - Seroquel 25 mg BID + 50 mg at bedtime  - Ativan 0.5 mg q4h PRN - IV per pt request  - Scheduled  doxepin PRN at night for sleep  - c/t PTA Buspar  - PTA prozac (dose reduced from 80 to 60 mg)  - Scheduled oxycodone TID + PRN for dyspnea  - Health psychology consult; appreciate recs     #Bilateral lower extremity DVT  #Right retroperitoneal hematoma   On 3/29 at outside hospital patient's O2 needs increased, duplex ultrasound revealed bilateral lower extremity DVT. CT PE negative for embolism.  Treated with IV heparin and transitioned to Lovenox 70 mg.  On 4/14 this was complicated by retroperitoneal bleed, requiring 4 units of packed red blood cells.  Lovenox was held, and IVC filter was placed on 4/16. Vascular surgery was involved, and a CT abdomen was stable bleed so no surgical intervention was required.  Eventually resumed on prophylactic 40mg of Lovenox twice daily.  -Continue 40 mg Lovenox qday  -hem/onc consulted; appreciate recs   - pain plan:              - Tylenol 975 mg q8h brianna              - oxycodone 2.5-5 mg q4h PRN for pain              - discontinued Dilaudid to avoid delirium  - Asymmetric foot swelling , 4/30/2021. Will not obtain US given there are no changes we would make to the anticoagulation plan; discussed with heme on 4/29/21  - LLE U/S 5/8, negative for new DVT    #Ileus, Improved  Began after bleed at outside hospital 4/14; was on TPN for nutrition.  GI was consulted for assistance with bowel management.  With escalating bowel regimen finally had small BM 4/25 after tap water enema, had some liquid stool 4/27. AXR shows non-obstructive bowel gas pattern.  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 Urine sodium 50 while her sodium was 128, certainly consistent  with this. Neurologic etiology with her GBM resection likely. Now trending up.   - trend Na; consider fluid restriction if any concern for worsening Na     #Shock Liver, improving  LFTs sharply up after RP bleed at OSH, [place] due to shock liver. LFT's have steadily trended down since. Only AST remains elevated and trending down.   -Recheck CMP in am    #Steroid-induced hyperglycemia   Intermittently requiring insulin  -Low sliding scale insulin  -Gluc checks     #Leukocytosis  Ddx is infection vs steroid-induced.  - trend CBC     #Hypertension  #Sinus tachycardia  Started on metoprolol at OSH for sinus tachycardia.  - c/t PTA amlodipine 10 mg qday  - hold BB    #Chronic medical problems:  - albuterol PRN  - BIPAP at bedtime (on CPAP at home) and for comfort  - GERD: back to PTA PO PPI     Diet: Dysphagia Diet Level 1 Pureed Nectar Thickened Liquids (pre-thickened or use instant food thickener)  Snacks/Supplements Adult: Ensure Enlive; Between Meals  Snacks/Supplements Adult: Magic Cup; With Meals    Fluids: None  Lines: PICC line  DVT Prophylaxis: Enoxaparin (Lovenox) SQ  Martino Catheter: not present  Code Status: Full Code         Disposition Plan   Expected discharge: 1-2 days, recommended to transitional care unit once respiratory status improves, plan for rehab and chemo/radiation in place .  Entered: Marvel Mcdowell MD 05/09/2021, 7:53 AM     The patient's care was discussed with the Attending Physician, Dr. Gordon.    Marvel Mcdowell MD  PGY-1, Internal Medicine  [facility]  ______________________________________________________________________    Interval History    Nursing notes reviewed, no acute events overnight. Patient requested to change the pantoprazole as the taste is less than ideal. No increases dyspnea, on room air breathing well. Slept well overnight.     4 Point ROS obtained and negative except as noted above.     Data reviewed today: I reviewed all medications, new labs and  imaging results over the last 24 hours. I personally reviewed     Physical Exam   Vital Signs: Temp: 97.9  F (36.6  C) Temp src: Axillary BP: 137/79 Pulse: 86   Resp: 16 SpO2: 94 % O2 Device: None (Room air)    Weight: 154 lbs 1.62 oz   General Appearance: Alert, awake, sitting upright in bed.  Eyes: no scleral icterus, EOMI  HEENT: neck supple, normocephalic. Dry appearing mucous membranes   Respiratory: No accessory muscle use, mildly course breath sounds in bilateral lung fields anteriorly. Breathing comfortably on room air.  Cardiovascular: regular rate rhythm, normal S1/S2, no murmurs appreciated, intact distal pulses  GI: soft, non-tender, non-distended  Skin: scattered bruises over upper exposed upper extremities, no rash noted.   Musculoskeletal: pedal edema in the left foot; no edema of right foot.  Neurologic: Moves all extremities spontaneously, RUE mildly weaker (3/5) compared to LUE, follows commands.   Psychiatric: Pleasant affect, happy.     Data   Recent Labs   Lab 05/09/21  0450 05/08/21  0529 05/07/21  2149 05/07/21  0457 05/03/21  0502 05/03/21  0502   WBC 9.6 10.3  --  9.7   < > 9.3   HGB 9.6* 10.2*  --  9.0*   < > 8.3*   MCV 99 99  --  100   < > 96    204  --  196   < > 199   INR  --   --   --   --   --  0.99    140 140 139   < > 139   POTASSIUM 3.5 3.8 3.8 3.6   < > 3.6   CHLORIDE 107 106 105 104   < > 103   CO2 28 30 31 32   < > 31   BUN 28 24 27 24   < > 35*   CR 0.50* 0.41* 0.51* 0.40*   < > 0.37*   ANIONGAP 5 4 4 3   < > 5   ESTIVEN 8.9 9.5 9.2 8.7   < > 8.8   GLC 84 91 119* 85   < > 123*   ALBUMIN 2.7* 2.9*  --  2.6*   < > 2.4*   PROTTOTAL 5.5* 6.3*  --  5.5*   < > 5.2*   BILITOTAL 0.4 0.5  --  0.4   < > 0.3   ALKPHOS 97 119  --  105   < > 111   ALT 42 43  --  40   < > 44   AST 27 30  --  26   < > 25    < > = values in this interval not displayed.     Recent Results (from the past 24 hour(s))   US Lower Extremity Venous Duplex Left    Narrative    EXAMINATION: US LOWER EXTREMITY  VENOUS DUPLEX LEFT  5/8/2021 3:40 PM      CLINICAL HISTORY: Asymmetric swelling    COMPARISON: 4/27/2021        PROCEDURE COMMENTS: Ultrasound was performed of the deep venous system  of the left lower extremity using grayscale, color, and spectral  Doppler.    FINDINGS:  The common femoral, greater saphenous origin, femoral, popliteal, and  deep calf veins are visualized and are patent. Venous waveforms are  normal. There is normal response to compression.    Superficial vein in the left medial posterior calf is noncompressible,  likely a soleal vein..    Incidentally noted fluid collection in the left popliteal fossa  measuring approximately 5.3 x 0.9 x 4.4 cm.      Impression    IMPRESSION:.  1. No deep vein thrombosis in the left lower extremity.  2. Persistent thrombus in the superficial left soleal vein.  3. Similar appearance of a popliteal fossa fluid collection    I have personally reviewed the examination and initial interpretation  and I agree with the findings.    MARILIN BERG MD

## 2021-05-09 NOTE — PROGRESS NOTES
Care Management Follow Up    Length of Stay (days): 13    Expected Discharge Date: 05/10/21     Concerns to be Addressed: discharge planning     Patient plan of care discussed at interdisciplinary rounds: No    Anticipated Discharge Disposition: Transitional Care     Anticipated Discharge Services: None  Anticipated Discharge DME: None    Patient/family educated on Medicare website which has current facility and service quality ratings: no(Pt/dtr both would like referral to  TCU)  Education Provided on the Discharge Plan:    Patient/Family in Agreement with the Plan: yes    Referrals Placed by CM/SW: Post Acute Facilities  Private pay costs discussed: Private home care hire    Additional Information:  SW following up on referral to speak with patient about alternative TCU facility options, presented list of TCU options from Medicare website. Patient and Patient's daughter discussed that patient would be starting radiation and oral chemotherapy and would need a TCU that would allow patient to go to radiation appointments and provide oral chemotherapy medications. Patient's daughter reported that she and patient were told that patient would need to go to a [facility] TCU, as [facility] TCU's would not allow radiation appointments or cover the cost of oral chemo medications. Patient's daughter reported patient's care team presented patient with two options [facility] [place] or [facility] [place], [place]. Patient's daughter and patient stated they did not want placement at the [place] location due to long distance for daughter and other family members to visit with patient. Patient's daughter stated they had a filed a patient relations claim with [facility] location and were concerned about returning to facility for care, concerns about getting the same providers that provided previous care. Patient and patient's daughter were looking for other options. SW validated patient and patient's  daughter's concerns and feelings of frustrations with limited options. SW confirmed the information patient and patient's daughter were given about community TCU's not accepting patient's who were receiving oral chemo medications or allowing patients to leave to attend radiation appointments. SW confirmed that many community TCU's would require patient to pause treatment in order to receive services and placement at a community TCU. Patient and patient's daughter expressed not wanting to pause treatment. SW validated this and again validated patient's limited options due to complexity of care and support needed.       Patient and patient's daughter asked if there were other options they could consider. SW asked about other supports patient would need beyond the cancer treatment, what else had patient's providers recommended. Patient stated they can not walk and are bed bound. Patient's daughter stated patient would need PT, OT and speech services. SW stated patient and patient's daughter could look into home care. SW provided education around the difference in home care the amount of hours they typically provide for services verses TCU where patient would be provided with 24/7 medical care. SW stated patient's daughter could call patient's insurance about coverage for these services, but most of the cost would be an out of pocket expanse. Patient and patient's daughter would need to explore the different services available to them I.e PT, OT, speech, PCA, skill nursing and if this would be enough to meet patient's needs. Recognizing that patient may ultimately need to go into a TCU for best support.     Patient and patient's daughter expressed wanting to consider all options, felt limited in their options and wanted more ability to review various placements. SW validated this and stated they could explore the home care options, the TCU options presented to  them, as well as look at the medicare website for TCU  options and home care options, then decide what would be the best for achieving their goal of moving forward in cancer treatment and providing the most supportive care for patient. Patient and patient's daughter will look into this and continue further discussion with care team about placement.     KRIS Pitts  Weekend Social Work (avail on [facility]):  4A, 4C, 4E, 5A, 5B    pager [phone]  6A, 6B, 6C, 6D     pager [phone]  7A, 7B, 7C, 7D, 5C    pager [phone]  on-call/after hours     pager [phone]       Weekend RN Care Coordinator  pager [phone]  (home d/c with needs incl home care, assisted living facility returns, durable medical equip, IV antibiotics)

## 2021-05-09 NOTE — PLAN OF CARE
Neuro: A&Ox4. Forgetful at times. Anxious at times, ativan given x1.   Cardiac: SR HR 90's. Afebrile. VSS    Respiratory: Sating >92% on RA  GI/: Adequate urine output, purwick in place. BM X1  Diet/appetite: Tolerating DD1 diet. Eating well. Nectar thick liquids.   Activity: Assist 2 to Lift, up to chair and in halls.  Pain: Denies. At acceptable level on current regimen.   Skin: No new deficits noted.  LDA's: TL PICC     Plan: Continue with POC. Notify primary team with changes.

## 2021-05-10 ENCOUNTER — APPOINTMENT (OUTPATIENT)
Dept: PHYSICAL THERAPY | Facility: CLINIC | Age: 76
DRG: 177 | End: 2021-05-10
Attending: STUDENT IN AN ORGANIZED HEALTH CARE EDUCATION/TRAINING PROGRAM
Payer: MEDICARE

## 2021-05-10 ENCOUNTER — ALLIED HEALTH/NURSE VISIT (OUTPATIENT)
Dept: RADIATION ONCOLOGY | Facility: CLINIC | Age: 76
End: 2021-05-10
Attending: STUDENT IN AN ORGANIZED HEALTH CARE EDUCATION/TRAINING PROGRAM
Payer: MEDICARE

## 2021-05-10 LAB
ALBUMIN SERPL-MCNC: 2.9 G/DL (ref 3.4–5)
ALP SERPL-CCNC: 104 U/L (ref 40–150)
ALT SERPL W P-5'-P-CCNC: 44 U/L (ref 0–50)
ANION GAP SERPL CALCULATED.3IONS-SCNC: 7 MMOL/L (ref 3–14)
AST SERPL W P-5'-P-CCNC: 27 U/L (ref 0–45)
BILIRUB SERPL-MCNC: 0.5 MG/DL (ref 0.2–1.3)
BUN SERPL-MCNC: 26 MG/DL (ref 7–30)
CALCIUM SERPL-MCNC: 9.2 MG/DL (ref 8.5–10.1)
CHLORIDE SERPL-SCNC: 106 MMOL/L (ref 94–109)
CO2 SERPL-SCNC: 28 MMOL/L (ref 20–32)
CREAT SERPL-MCNC: 0.48 MG/DL (ref 0.52–1.04)
ERYTHROCYTE [DISTWIDTH] IN BLOOD BY AUTOMATED COUNT: 20.8 % (ref 10–15)
GFR SERPL CREATININE-BSD FRML MDRD: >90 ML/MIN/{1.73_M2}
GLUCOSE BLDC GLUCOMTR-MCNC: 108 MG/DL (ref 70–99)
GLUCOSE BLDC GLUCOMTR-MCNC: 116 MG/DL (ref 70–99)
GLUCOSE BLDC GLUCOMTR-MCNC: 155 MG/DL (ref 70–99)
GLUCOSE BLDC GLUCOMTR-MCNC: 177 MG/DL (ref 70–99)
GLUCOSE BLDC GLUCOMTR-MCNC: 89 MG/DL (ref 70–99)
GLUCOSE BLDC GLUCOMTR-MCNC: 97 MG/DL (ref 70–99)
GLUCOSE BLDC GLUCOMTR-MCNC: 98 MG/DL (ref 70–99)
GLUCOSE SERPL-MCNC: 93 MG/DL (ref 70–99)
HCT VFR BLD AUTO: 33.7 % (ref 35–47)
HGB BLD-MCNC: 10.5 G/DL (ref 11.7–15.7)
INR PPP: 0.98 (ref 0.86–1.14)
LABORATORY COMMENT REPORT: NORMAL
MAGNESIUM SERPL-MCNC: 2.2 MG/DL (ref 1.6–2.3)
MCH RBC QN AUTO: 31.1 PG (ref 26.5–33)
MCHC RBC AUTO-ENTMCNC: 31.2 G/DL (ref 31.5–36.5)
MCV RBC AUTO: 100 FL (ref 78–100)
PHOSPHATE SERPL-MCNC: 3.4 MG/DL (ref 2.5–4.5)
PLATELET # BLD AUTO: 233 10E9/L (ref 150–450)
POTASSIUM SERPL-SCNC: 3.4 MMOL/L (ref 3.4–5.3)
PREALB SERPL IA-MCNC: 38 MG/DL (ref 15–45)
PROT SERPL-MCNC: 5.8 G/DL (ref 6.8–8.8)
RBC # BLD AUTO: 3.38 10E12/L (ref 3.8–5.2)
SARS-COV-2 RNA RESP QL NAA+PROBE: NEGATIVE
SODIUM SERPL-SCNC: 141 MMOL/L (ref 133–144)
SPECIMEN SOURCE: NORMAL
TRIGL SERPL-MCNC: 191 MG/DL
WBC # BLD AUTO: 9.7 10E9/L (ref 4–11)

## 2021-05-10 PROCEDURE — G0463 HOSPITAL OUTPT CLINIC VISIT: HCPCS

## 2021-05-10 PROCEDURE — 77336 RADIATION PHYSICS CONSULT: CPT | Performed by: STUDENT IN AN ORGANIZED HEALTH CARE EDUCATION/TRAINING PROGRAM

## 2021-05-10 PROCEDURE — 85027 COMPLETE CBC AUTOMATED: CPT | Performed by: STUDENT IN AN ORGANIZED HEALTH CARE EDUCATION/TRAINING PROGRAM

## 2021-05-10 PROCEDURE — 77386 HC IMRT TREATMENT DELIVERY, COMPLEX: CPT | Performed by: STUDENT IN AN ORGANIZED HEALTH CARE EDUCATION/TRAINING PROGRAM

## 2021-05-10 PROCEDURE — U0003 INFECTIOUS AGENT DETECTION BY NUCLEIC ACID (DNA OR RNA); SEVERE ACUTE RESPIRATORY SYNDROME CORONAVIRUS 2 (SARS-COV-2) (CORONAVIRUS DISEASE [COVID-19]), AMPLIFIED PROBE TECHNIQUE, MAKING USE OF HIGH THROUGHPUT TECHNOLOGIES AS DESCRIBED BY CMS-2020-01-R: HCPCS | Performed by: INTERNAL MEDICINE

## 2021-05-10 PROCEDURE — 250N000013 HC RX MED GY IP 250 OP 250 PS 637: Performed by: STUDENT IN AN ORGANIZED HEALTH CARE EDUCATION/TRAINING PROGRAM

## 2021-05-10 PROCEDURE — 250N000013 HC RX MED GY IP 250 OP 250 PS 637: Performed by: INTERNAL MEDICINE

## 2021-05-10 PROCEDURE — 83735 ASSAY OF MAGNESIUM: CPT | Performed by: STUDENT IN AN ORGANIZED HEALTH CARE EDUCATION/TRAINING PROGRAM

## 2021-05-10 PROCEDURE — 80053 COMPREHEN METABOLIC PANEL: CPT | Performed by: STUDENT IN AN ORGANIZED HEALTH CARE EDUCATION/TRAINING PROGRAM

## 2021-05-10 PROCEDURE — 85610 PROTHROMBIN TIME: CPT | Performed by: STUDENT IN AN ORGANIZED HEALTH CARE EDUCATION/TRAINING PROGRAM

## 2021-05-10 PROCEDURE — 99232 SBSQ HOSP IP/OBS MODERATE 35: CPT | Performed by: INTERNAL MEDICINE

## 2021-05-10 PROCEDURE — 97116 GAIT TRAINING THERAPY: CPT | Mod: GP

## 2021-05-10 PROCEDURE — 82962 GLUCOSE BLOOD TEST: CPT

## 2021-05-10 PROCEDURE — 206N000001 HC R&B BMT UMMC

## 2021-05-10 PROCEDURE — 36592 COLLECT BLOOD FROM PICC: CPT | Performed by: STUDENT IN AN ORGANIZED HEALTH CARE EDUCATION/TRAINING PROGRAM

## 2021-05-10 PROCEDURE — 250N000011 HC RX IP 250 OP 636: Performed by: STUDENT IN AN ORGANIZED HEALTH CARE EDUCATION/TRAINING PROGRAM

## 2021-05-10 PROCEDURE — 97530 THERAPEUTIC ACTIVITIES: CPT | Mod: GP

## 2021-05-10 PROCEDURE — 84100 ASSAY OF PHOSPHORUS: CPT | Performed by: STUDENT IN AN ORGANIZED HEALTH CARE EDUCATION/TRAINING PROGRAM

## 2021-05-10 PROCEDURE — 84134 ASSAY OF PREALBUMIN: CPT | Performed by: STUDENT IN AN ORGANIZED HEALTH CARE EDUCATION/TRAINING PROGRAM

## 2021-05-10 PROCEDURE — 77427 RADIATION TX MANAGEMENT X5: CPT | Mod: GC | Performed by: STUDENT IN AN ORGANIZED HEALTH CARE EDUCATION/TRAINING PROGRAM

## 2021-05-10 PROCEDURE — 250N000012 HC RX MED GY IP 250 OP 636 PS 637: Performed by: STUDENT IN AN ORGANIZED HEALTH CARE EDUCATION/TRAINING PROGRAM

## 2021-05-10 PROCEDURE — 84478 ASSAY OF TRIGLYCERIDES: CPT | Performed by: STUDENT IN AN ORGANIZED HEALTH CARE EDUCATION/TRAINING PROGRAM

## 2021-05-10 PROCEDURE — U0005 INFEC AGEN DETEC AMPLI PROBE: HCPCS | Performed by: INTERNAL MEDICINE

## 2021-05-10 RX ORDER — LORAZEPAM 2 MG/ML
0.5 INJECTION INTRAMUSCULAR EVERY 6 HOURS PRN
Status: DISCONTINUED | OUTPATIENT
Start: 2021-05-10 | End: 2021-05-13

## 2021-05-10 RX ORDER — LORAZEPAM 0.5 MG/1
0.5 TABLET ORAL EVERY 4 HOURS PRN
Status: DISCONTINUED | OUTPATIENT
Start: 2021-05-10 | End: 2021-05-15 | Stop reason: HOSPADM

## 2021-05-10 RX ADMIN — ACETAMINOPHEN 975 MG: 325 TABLET, FILM COATED ORAL at 13:25

## 2021-05-10 RX ADMIN — ACYCLOVIR: 50 OINTMENT TOPICAL at 21:40

## 2021-05-10 RX ADMIN — DOXEPIN HYDROCHLORIDE 3 MG: 10 SOLUTION ORAL at 21:40

## 2021-05-10 RX ADMIN — ACYCLOVIR: 50 OINTMENT TOPICAL at 15:51

## 2021-05-10 RX ADMIN — PANTOPRAZOLE SODIUM 40 MG: 40 TABLET, DELAYED RELEASE ORAL at 09:24

## 2021-05-10 RX ADMIN — ACYCLOVIR: 50 OINTMENT TOPICAL at 09:14

## 2021-05-10 RX ADMIN — DEXAMETHASONE 4 MG: 4 TABLET ORAL at 09:16

## 2021-05-10 RX ADMIN — SULFAMETHOXAZOLE AND TRIMETHOPRIM 1 TABLET: 400; 80 TABLET ORAL at 09:16

## 2021-05-10 RX ADMIN — ACYCLOVIR: 50 OINTMENT TOPICAL at 17:07

## 2021-05-10 RX ADMIN — Medication 2.5 MG: at 09:24

## 2021-05-10 RX ADMIN — Medication 2.5 MG: at 21:27

## 2021-05-10 RX ADMIN — FLUOXETINE HYDROCHLORIDE 60 MG: 40 CAPSULE ORAL at 09:15

## 2021-05-10 RX ADMIN — QUETIAPINE 25 MG: 25 TABLET ORAL at 17:07

## 2021-05-10 RX ADMIN — ACETAMINOPHEN 975 MG: 325 TABLET, FILM COATED ORAL at 21:26

## 2021-05-10 RX ADMIN — ENOXAPARIN SODIUM 40 MG: 100 INJECTION SUBCUTANEOUS at 17:07

## 2021-05-10 RX ADMIN — BUSPIRONE HYDROCHLORIDE 30 MG: 15 TABLET ORAL at 09:16

## 2021-05-10 RX ADMIN — LINACLOTIDE 290 MCG: 145 CAPSULE, GELATIN COATED ORAL at 09:16

## 2021-05-10 RX ADMIN — MESALAMINE 1000 MG: 1000 SUPPOSITORY RECTAL at 22:12

## 2021-05-10 RX ADMIN — ACETAMINOPHEN 975 MG: 325 TABLET, FILM COATED ORAL at 09:16

## 2021-05-10 RX ADMIN — Medication 2.5 MG: at 13:25

## 2021-05-10 RX ADMIN — LORAZEPAM 0.5 MG: 2 INJECTION INTRAMUSCULAR; INTRAVENOUS at 10:43

## 2021-05-10 RX ADMIN — LORAZEPAM 0.5 MG: 2 INJECTION INTRAMUSCULAR; INTRAVENOUS at 22:26

## 2021-05-10 RX ADMIN — QUETIAPINE 25 MG: 25 TABLET ORAL at 09:16

## 2021-05-10 RX ADMIN — PANTOPRAZOLE SODIUM 40 MG: 40 TABLET, DELAYED RELEASE ORAL at 15:51

## 2021-05-10 RX ADMIN — LEVETIRACETAM 250 MG: 250 TABLET, FILM COATED ORAL at 21:28

## 2021-05-10 RX ADMIN — QUETIAPINE 50 MG: 50 TABLET ORAL at 22:11

## 2021-05-10 RX ADMIN — AMLODIPINE BESYLATE 10 MG: 10 TABLET ORAL at 09:16

## 2021-05-10 RX ADMIN — FUROSEMIDE 40 MG: 40 TABLET ORAL at 09:16

## 2021-05-10 RX ADMIN — LEVETIRACETAM 250 MG: 250 TABLET, FILM COATED ORAL at 09:16

## 2021-05-10 RX ADMIN — BUSPIRONE HYDROCHLORIDE 30 MG: 15 TABLET ORAL at 21:27

## 2021-05-10 RX ADMIN — ACYCLOVIR: 50 OINTMENT TOPICAL at 13:19

## 2021-05-10 ASSESSMENT — ACTIVITIES OF DAILY LIVING (ADL)
ADLS_ACUITY_SCORE: 19
ADLS_ACUITY_SCORE: 18
ADLS_ACUITY_SCORE: 18
ADLS_ACUITY_SCORE: 19
ADLS_ACUITY_SCORE: 19
ADLS_ACUITY_SCORE: 18

## 2021-05-10 NOTE — PROGRESS NOTES
Focus- BL buttocks, coccyx and sacrum, and BL cheeks  D. Received consult for suspected pressure injury on BL cheeks.   Assessed the area and noted linear blanchable erythema on intergluteal cleft r/t moisture. Buttocks epidermis is intact.   Also noted BL cheeks with superficial skin tears, ~3.2 x 2.1 x <0.1cm.scattered scabbing to bilateral cheeks, remains not consistent with pressure. Wears BI-pap at night with full mask. Currently on nasal cannula. No pressure injury detected. Able to shift weight from side to side. Educated pt on pressure injury, risk factors, and preventive measures. Verbalized understanding, however unable to keep HOB lower than 30 degree due to breathing issues at this time.  P. Continue with Mepilex lite on BL cheeks and change every 3 days. Continue to follow pressure injury prevention and incontinence protocol. Will sign off.

## 2021-05-10 NOTE — PLAN OF CARE
/60   Pulse 90   Temp 97.7  F (36.5  C) (Oral)   Resp 18   Wt 67.1 kg (147 lb 14.9 oz)   SpO2 96%   BMI 26.20 kg/m    [id number]: VSS. Denies pain. Alert, confused/ forgetful at times. Updated daughter. Ate dinner. DD1 diet with nectar thick liquids. Turned q.2. No other new concerns or complaints. Patient is very anxious baseline, may discharge tomorrow but daughter thought it may be better to wait to tell her.   Problem: Adult Inpatient Plan of Care  Goal: Patient-Specific Goal (Individualized)  Outcome: No Change     Problem: Adult Inpatient Plan of Care  Goal: Absence of Hospital-Acquired Illness or Injury  Outcome: No Change     Problem: Adult Inpatient Plan of Care  Goal: Optimal Comfort and Wellbeing  Outcome: No Change

## 2021-05-10 NOTE — PLAN OF CARE
Neuro: A&Ox4. Forgetful at times. Anxious at times, ativan given x1.   Cardiac: SR HR 90's. Afebrile. VSS    Respiratory: Sating >92% on RA  GI/: Adequate urine output, purwick in place. No BM   Diet/appetite: Tolerating DD1 diet. Eating well. Nectar thick liquids.   Activity: Assist 2 to Lift, up to chair and in halls.  Pain: Denies. At acceptable level on current regimen.   Skin: No new deficits noted.  LDA's: TL PICC     Plan: Continue with POC. Notify primary team with changes.

## 2021-05-10 NOTE — PROGRESS NOTES
Hematology / Oncology  Daily Progress Note   Date of Service: 05/10/2021  Patient: Olga Bailey  MRN: [id number]  Admission Date: 4/26/2021  Hospital Day # 14  Cancer Diagnosis: Glioblastoma  Primary Outpatient Oncologist: Dr. Baker  Current Treatment Plan: S/p tumor resection 2/23/21. Day 1 of 15 fractions = 5/4/21.     Assessment & Plan:   Olga Bailey is a 75 year old year old female with history of left frontoparietal glioblastoma s/p tumor resection 2/23/21. The plan was to undergo adjuvant chemoradiation, which has been held for prolonged admission at [facility] (3/26-4/26/21) with pneumonia and multiple DVTs, complicated by retroperitoneal hemorrhage and shock s/p IVC filter placement. She was transferred to [facility] on 4/26/21 for further Oncology and Radiation Oncology care.        Recommendations:  - Today is day 5 of 15 XRT to be completed today. Continue Dexamethasone 4 mg daily for course of treatment.  - Follow up scheduled with Dr. Baker after completion of radiation on 5/25.  - Continue PT/OT; recommendation for TCU on discharge. Discussed with patient today that she will have to go to  TCU in order to continue ongoing XRT, ending  5/24. After completion of radiation could then transfer to TCU closer to home. Discussed with unit SW to share with patients daughter.        # Glioblastoma Multiforme  Follows with Dr. Baker. For further detail of oncologic history, please see below. In summary, patient was diagnosed with a left frontoparietal brain glioblastoma s/p resection 2/23/2021. Seen by Dr. Baker on 3/23, recommended temozolomide and radiation however treatment has not been initiated yet due to prolonged admission at [facility] with pneumonia, B/l LE DVTs, and RP hemorrhage. Ultimately transferred to [facility] where she could receive radiation treatment as appropriate.    - Regarding steroids, taper previously per pulm/ID of prednisone 20 mg today, followed by 10 mg tomorrow  (5/4). In conjunction with radiation, please transition to Dexamethasone 4 mg daily starting 5/4.   - Care conference held 5/3 with primary team, palliative care, heme/onc team,  and patient's family. Updated on current status/hospitalization and proposed treatment plan of 15 fractions over 15 weekdays (starting inpatient 5/4).   - Oral chemotherapy (Temodar) could with considered with improvement in performance status, however would not be feasible at this juncture. Follow up with Dr. Baker to discuss Temodar after completion of radiation scheduled 5/25.      # Bilateral LE DVTs  # Retroperitoneal bleed  B/l LE DVTs noted on 3/29/21.   - Started therapeutic anticoagulation. C/b right retroperitoneal bleed 4/14/21 and subsequently developed acute hypoxic respiratory failure.   - IVC filter placed with IR on 4/16/21.   - Bilateral LE ultrasound showed improvement of previously diagnosed DVTs, though not resolution  - keep IVC filter, ok to continue ppx lovenox     We will continue to follow this patient. Please do not hesitate to page with any questions or concerns.     Patient was seen and plan of care was discussed with attending physician Dr. Babcock.     Alice Allan ([facility]ee[facility] HealthcareSanjuana, PALENY    Hematology/Oncology   Pager: [phone]      Oncologic History:  - 2/2021 PRESENTATION: Progressive aphasia.   - 2/19/2021 MR brain imaging with 3.3 x 2.8 x 2.8 cm enhancing mass in left frontal-parietal region. A second contrast enhancing lesion (0.5 x 1.0 x 1.0 cm) in right occipital lobe which is dural based and largely stable in size since 9/2011; likely representing a meningioma.  - 2/23/2021 SURGERY: Gross total resection by Dr. Cummings  PATHOLOGY: Glioblastoma; IDH1-R132H wild-type/ IDH 1 and 2 wildtype, MGMT promoter methylated. Not BRAF mutated.   - 3/23/2021 Established care with Dr. Baker. Plan was to initiate chemotherapy with temozolomide 75mg/m2 (140mg) daily in the evening until completion of radiation.  Temozolomide held for admission to [facility]. Adjuvant radiation was planned at [facility], held for dyspnea and subsequently admitted to [facility] with acute hypoxic respiratory failure and pneumonia.   ___________________________________________________________________    Subjective & Interval History:    No acute events noted overnight. Ongoing weakness, making progress with PT daily. Tolerating XRT well, plan for day 5/15 today. Fair appetite. No issues with bowels or bladder. Open to  TCU and eventual transfer to TCU closer to home in [facility].     Physical Exam:    Blood pressure (!) 140/98, pulse 109, temperature 97.4  F (36.3  C), temperature source Axillary, resp. rate 16, weight 67.1 kg (147 lb 14.9 oz), SpO2 91 %.    Constitutional: Pleasant female seen sitting up in chair. Pleasant, conversational. Non- toxic appearing. No acute distress.   HEENT: Normocephalic, atraumatic. Sclerae anicteric. EOM intact.   Respiratory: Breathing comfortably on room air. Lungs CTAB.   Cardiovascular: RRR, no murmurs   Skin: Facial xerosis. No jaundice appreciated.   Musculoskeletal/ Extremities: No redness, warmth, or swelling of the joints appreciated.   Neurologic: Alert and oriented. Speech normal. Grossly nonfocal. Memory and thought process preserved.   Neuropsychiatric: Calm, affect congruent to situation. Appropriate mood and affect. Good judgment and insight. No visual/auditory hallucinations.     Labs & Studies: I personally reviewed the following studies:  ROUTINE LABS (Last four results):  CMP  Recent Labs   Lab 05/10/21  0617 05/09/21  0450 05/08/21  0529 05/07/21  2149 05/07/21  0457    141 140 140 139   POTASSIUM 3.4 3.5 3.8 3.8 3.6   CHLORIDE 106 107 106 105 104   CO2 28 28 30 31 32   ANIONGAP 7 5 4 4 3   GLC 93 84 91 119* 85   BUN 26 28 24 27 24   CR 0.48* 0.50* 0.41* 0.51* 0.40*   GFRESTIMATED >90 >90 >90 >90 >90   GFRESTBLACK >90 >90 >90 >90 >90   ESTIVEN 9.2 8.9 9.5 9.2 8.7   MAG 2.2 2.1 2.1  2.1 2.0   PHOS 3.4 3.9 3.4  --  3.2   PROTTOTAL 5.8* 5.5* 6.3*  --  5.5*   ALBUMIN 2.9* 2.7* 2.9*  --  2.6*   BILITOTAL 0.5 0.4 0.5  --  0.4   ALKPHOS 104 97 119  --  105   AST 27 27 30  --  26   ALT 44 42 43  --  40     CBC  Recent Labs   Lab 05/10/21  0617 05/09/21  0450 05/08/21  0529 05/07/21  0457   WBC 9.7 9.6 10.3 9.7   RBC 3.38* 3.13* 3.36* 2.93*   HGB 10.5* 9.6* 10.2* 9.0*   HCT 33.7* 30.9* 33.2* 29.3*    99 99 100   MCH 31.1 30.7 30.4 30.7   MCHC 31.2* 31.1* 30.7* 30.7*   RDW 20.8* 21.0* 20.5* 20.8*    207 204 196     INR  Recent Labs   Lab 05/10/21  0617   INR 0.98       Medications list for reference:  Current Facility-Administered Medications   Medication     acetaminophen (TYLENOL) tablet 975 mg     acyclovir (ZOVIRAX) 5 % ointment     albuterol (PROAIR HFA/PROVENTIL HFA/VENTOLIN HFA) 108 (90 Base) MCG/ACT inhaler 2 puff     amLODIPine (NORVASC) tablet 10 mg     bisacodyl (DULCOLAX) Suppository 10 mg     busPIRone (BUSPAR) tablet 30 mg     carboxymethylcellulose PF (REFRESH PLUS) 0.5 % ophthalmic solution 1 drop     dexamethasone (DECADRON) tablet 4 mg     dextrose 10% infusion     glucose gel 15-30 g    Or     dextrose 50 % injection 25-50 mL    Or     glucagon injection 1 mg     doxepin (SINEquan) 10 MG/ML (HIGH CONC) solution 3 mg     enoxaparin ANTICOAGULANT (LOVENOX) injection 40 mg     FLUoxetine (PROzac) capsule 60 mg     furosemide (LASIX) tablet 40 mg     insulin aspart (NovoLOG) injection (RAPID ACTING)     insulin aspart (NovoLOG) injection (RAPID ACTING)     levETIRAcetam (KEPPRA) tablet 250 mg     lidocaine (LMX4) cream     lidocaine 1 % 0.1-1 mL     linaclotide (LINZESS) capsule 290 mcg     LORazepam (ATIVAN) injection 0.5 mg     melatonin tablet 6 mg     mesalamine (CANASA) Suppository 1,000 mg     naloxone (NARCAN) injection 0.2 mg    Or     naloxone (NARCAN) injection 0.4 mg    Or     naloxone (NARCAN) injection 0.2 mg    Or     naloxone (NARCAN) injection 0.4 mg      ondansetron (ZOFRAN) injection 4 mg     oxyCODONE IR (ROXICODONE) half-tab 2.5 mg     oxyCODONE IR (ROXICODONE) half-tab 2.5-5 mg     pantoprazole (PROTONIX) EC tablet 40 mg     polyethylene glycol (MIRALAX) Packet 17 g     prochlorperazine (COMPAZINE) injection 5 mg     QUEtiapine (SEROquel) tablet 25 mg     QUEtiapine (SEROquel) tablet 25 mg     QUEtiapine (SEROquel) tablet 50 mg     sennosides (SENOKOT) tablet 8.6 mg     simethicone (MYLICON) suspension 40 mg     sodium chloride (PF) 0.9% PF flush 10 mL     sodium chloride (PF) 0.9% PF flush 10 mL     sodium chloride (PF) 0.9% PF flush 10 mL     sodium chloride (PF) 0.9% PF flush 10 mL     sulfamethoxazole-trimethoprim (BACTRIM) 400-80 MG per tablet 1 tablet

## 2021-05-10 NOTE — PROGRESS NOTES
[facility]    Progress Note - Maroon 3 Service        Date of Admission:  4/26/2021    Assessment & Plan   Olga Bailey is a 75 y.o. F w/ GBM s/p resection (Feb 2021), depression/anxiety, HTN, asthma, and GERD, who was transferred to [facility] for consideration of inpt rad/onc treatment while she continues to be treated for complex presentation of acute hypoxic respiratory failure (due to possible PJP pneumonia and/or TRALI), 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    Today:  - Dexamethasone 4mg Qday  - Awaiting TCU placement--pt has been accepted to [facility], but pt and daughter are seeking other options due to bad experience there in the past.      #Acute hypoxic respiratory failure   #Bilateral Pneumonia; presumed PJP s/p ABx treatment  #Concern for transfusion-related acute lung injury (TRALI)  #Concern for R hemidiaphragm paralysis  Dyspneic/hypoxic in late March for admission at OSH. Initially treated with ceftri/doxy. ID switched to course of Zosyn/doxy with Bactrim treatment dosing for suspected PJP (had been on steroids after admission for glioblastoma resection; Fungitell positive but Galactomannan negative). Unable to get adequate sputum samples for diagnosis of PJP. Improved with the Bactrim until several days after RP bleed (see below) and was c/f TRALI as well. W/u for PE, COVID, and respiratory viral panel was negative. Bilateral lung opacities present. Concern for ongoing PJP infection (Bactrim was re-started), inflammatory lung condition (increased steroids), volume overload (diuresed for several days with IV Lasix). Imaging shows R hemidiaphragm elevation (concerning for paralysis?), and opacities appear to be improving as are oxygen requirements. Holding off on bronch per pulm recs and pt/family preference given likely need for intubation. Has had several RRTs for tachypnea and tachycardia, appear primarily related to  anxiety. Improve with PRN anxiety medication and Bipap for comfort. Full work-up largely unremarkable. Slowly able to wean to RA by 5/8.    - Pulmonology consulted, following peripherally. Plan for F/U w/ Pulmonary in 2-3 months with repeat CT imaging, complete PFTs +/- six minute walk  - Furosemide PO 40mg QAM beginning 5/5 - goal net 0 to -500 ml daily  - pulm, ID previously consulted   - Steroids: from methylpred -> prednisone, discontinued 5/4   - PJP ppx: Bactrim single strength qday until 1.5 mo after steroids end (completed 3 week treatment course at OSH)  - PJP stain, PCR; conventional sputum culture ordered if patient can produce sputum   - Bipap when sleeping and PRN for comfort  - SLP consulted - DD1 and nectar thick liquids     Glioblastoma Multiforme  Left frontoparietal brain glioblastoma status post resection 2/23/2021. Seen by radiation oncology 3/24 recommended XRT and chemo radiation. However subsequently admitted for resp failure. MRI brain here signs of possible disease recurrence. Hem/onc concerned that she might not be a candidate for chemotherapy or radiation currently due to poor performance status.  -Rad/onc following; appreciate recs   -Radiation course initiated as inpatient 5/4  -Heme/onc consulted, appreciate recs   - Currently not a candidate for chemo due to poor functional status   - would consider temozolomide at late date pending clinical course (needs to improve performance status)  - Levetiracetam 250mg BID    #Major depressive disorder, recurrent  #Anxiety  Follows with psychiatry and psychology as outpatint.  Saw health psychology during admission for GBM, made plan for outpatient follow-up and med adjustment,  but was unable.   - Psychiatry consulted, appreciate recs  - Palliative recs appreciated;  - Seroquel 25 mg BID + 50 mg at bedtime  - Ativan 0.5 mg q4h PRN - IV per pt request  - Scheduled doxepin PRN at night for sleep  - c/t PTA Buspar  - PTA prozac (dose reduced from  80 to 60 mg)  - Scheduled oxycodone TID + PRN for dyspnea  - Health psychology consult; appreciate recs     #Bilateral lower extremity DVT  #Right retroperitoneal hematoma   On 3/29 at outside hospital patient's O2 needs increased, duplex ultrasound revealed bilateral lower extremity DVT. CT PE negative for embolism.  Treated with IV heparin and transitioned to Lovenox 70 mg.  On 4/14 this was complicated by retroperitoneal bleed, requiring 4 units of packed red blood cells.  Lovenox was held, and IVC filter was placed on 4/16. Vascular surgery was involved, and a CT abdomen was stable bleed so no surgical intervention was required.  Eventually resumed on prophylactic 40mg of Lovenox twice daily.  -Continue 40 mg Lovenox qday  -hem/onc consulted; appreciate recs   - pain plan:              - Tylenol 975 mg q8h brianna              - oxycodone 2.5-5 mg q4h PRN for pain              - discontinued Dilaudid to avoid delirium  - Asymmetric foot swelling--LLE U/S 5/8, negative for new DVT, slightly improved chronic superficial thrombosis    #Ileus, resolved  Began after bleed at outside hospital 4/14; was on TPN for nutrition.  GI was consulted for assistance with bowel management.  With escalating bowel regimen finally had small BM 4/25 after tap water enema, had some liquid stool 4/27. AXR shows non-obstructive bowel gas pattern.  -Continue prior Bowel regimen: linaclotide, mesalamine, MiraLAX, simethicone PRN     #Non-severe malnutrition on TPN, Improved  #Concern for refeeding syndrome  #Hypophosphatemia   Now eating regular diet with adequate calorie counts. 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 Urine sodium 50 while her sodium was 128, certainly consistent with this. Neurologic etiology with her GBM resection likely. Now normalized      #Shock Liver, improving  LFTs sharply up after RP bleed at OSH, [place] due to shock liver. LFT's have steadily trended  down since. Only AST remains elevated and trending down.   -Recheck CMP in am    #Steroid-induced hyperglycemia   Intermittently requiring insulin  -Low sliding scale insulin  -Gluc checks     #Hypertension  #Sinus tachycardia  Started on metoprolol at OSH for sinus tachycardia.  - c/t PTA amlodipine 10 mg qday    #Chronic medical problems:  - albuterol PRN  - BIPAP at bedtime (on CPAP at home) and for comfort  - GERD: back to PTA PO PPI     Diet: Dysphagia Diet Level 1 Pureed Nectar Thickened Liquids (pre-thickened or use instant food thickener)  Snacks/Supplements Adult: Ensure Enlive; Between Meals  Snacks/Supplements Adult: Magic Cup; With Meals    Fluids: None  Lines: PICC line  DVT Prophylaxis: Enoxaparin (Lovenox) SQ  Martino Catheter: not present  Code Status: Full Code         Disposition Plan   Expected discharge: 1-2 days, recommended to transitional care unit once respiratory status improves, plan for rehab and chemo/radiation in place .  Entered: Juan Gordon MD 05/10/2021, 9:56 AM     Inocencio Gordon MD   of Medicine  [facility] Hospitalist  Pager [phone]    ______________________________________________________________________    Interval History    Care team notes reviewed, no acute events overnight. Tolerating diet. Remains stable on  room air. Slept well overnight.     4 Point ROS obtained and negative except as noted above.     Data reviewed today: I reviewed all medications, new labs and imaging results over the last 24 hours. I personally reviewed     Physical Exam   Vital Signs: Temp: 97.4  F (36.3  C) Temp src: Axillary BP: (!) 140/98 Pulse: 109   Resp: 16 SpO2: 91 % O2 Device: None (Room air)    Weight: 147 lbs 14.86 oz   General Appearance: Alert, awake, lying in bed  Eyes: no scleral icterus, EOMI  HEENT: neck supple, normocephalic. Dry appearing mucous membranes   Respiratory: No accessory muscle use, mildly course breath sounds in bilateral lung fields anteriorly.  Breathing comfortably on room air.  Cardiovascular: regular rate rhythm, normal S1/S2, no murmurs appreciated, intact distal pulses  GI: soft, non-tender, non-distended  Skin: scattered bruises over upper exposed upper extremities, no rash noted.   Musculoskeletal: pedal edema in the left foot; no edema of right foot.  Neurologic: Moves all extremities spontaneously, RUE mildly weaker (3/5) compared to LUE, follows commands.   Psychiatric: Pleasant affect, happy.     Data   Recent Labs   Lab 05/10/21  0617 05/09/21  0450 05/08/21  0529   WBC 9.7 9.6 10.3   HGB 10.5* 9.6* 10.2*    99 99    207 204   INR 0.98  --   --     141 140   POTASSIUM 3.4 3.5 3.8   CHLORIDE 106 107 106   CO2 28 28 30   BUN 26 28 24   CR 0.48* 0.50* 0.41*   ANIONGAP 7 5 4   ESTIVEN 9.2 8.9 9.5   GLC 93 84 91   ALBUMIN 2.9* 2.7* 2.9*   PROTTOTAL 5.8* 5.5* 6.3*   BILITOTAL 0.5 0.4 0.5   ALKPHOS 104 97 119   ALT 44 42 43   AST 27 27 30     No results found for this or any previous visit (from the past 24 hour(s)).

## 2021-05-10 NOTE — PROGRESS NOTES
/82 (BP Location: Left arm)   Pulse 100   Temp 97.8  F (36.6  C) (Oral)   Resp 16   Wt 67.1 kg (147 lb 14.9 oz)   SpO2 97%   BMI 26.20 kg/m    Patient transferred from  following radiation therapy. VSS on RA. Denies pain. No complaints.

## 2021-05-10 NOTE — PROGRESS NOTES
Care Management Follow Up    Length of Stay (days): 14    Expected Discharge Date: 05/11/21     Concerns to be Addressed: discharge planning     Patient plan of care discussed at interdisciplinary rounds: Yes    Anticipated Discharge Disposition: Transitional Care  Anticipated Discharge Services: None  Anticipated Discharge DME: None    Patient/family educated on Medicare website which has current facility and service quality ratings: yes\ - provided over the wknd by Wknd CAMRON. Previously pt/dtr had requested referral to  TCU  Education Provided on the Discharge Plan:  yes  Patient/Family in Agreement with the Plan: TBD - Dtr continues to be unsure if she is in agreement with plan to discharge to  TCU.     Referrals Placed by CM/SW: Post Acute Facilities  Private pay costs discussed: Not applicable    Additional Information:  SW continues to follow for safe discharge planning; TCU continues to be recommended at discharge. Pt will continue to require daily Radiation tx M-F for a total of 15 fractions; pt has f/u scheduled with OP Oncologist on 5/25 to discuss possible addition of PO Temodar.     CAMRON received call from Oncology Team (Alice) today informing SW that Pt has to go to  TCU at discharge in order to continue her daily XRT treatments. She requested SW communicate this to Pt's dtr. Per Alice she had a long conversation with Pt today re: this and she is in agreement.    CAMRON attempted to speak with dtr (La Nena) today re: discharge planning at 1050; La Nena stated she is at work and asked that SW call back at 1400 to discuss.    CAMRON spoke with La Nena at 1410. CAMRON discussed continued TCU recommendation and insistence from Oncology that Pt needs to discharge to  TCU at discharge. La Nena verbalized that she is aware that a community TCU would be unable to take Pt with the daily XRT. She asked SW if the ARU and TCU MD's were different and SW communicated they are per previous conversation with  TCU Liaison.  La Nena asking with the ARU and TCU Medical Director is different; CAMRON unsure but offered to f/u on this. She also asked that  TCU be aware that the hope is to start on PO Temodar when able; CAMRON updated  La Nena that this was previously communicated but offered to f/u again. CAMRON informed La Nena that CAMRON would speak with  TCU Liaison today re: her questions and call her back to discuss. CAMRON explained that Pt is stable for discharge and team would ideally like to plan for discharge tomorrow; La Nena continues to be unclear if she is in agreement with this discharge plan.     CAMRON spoke with with  TCU Liaison (Johanna) today. Per Johanna the ARU and TCU Medical Directors are different.  TCU is aware of XRT and potential chemo plan. Per Johanna, d/t Pt not being fully vaccinated (has only received 1 dose of COVID vaccine) the Pt would need to quarantine for 14 days w/no in person visitors. Johanna asked that CAMRON update La Nena re: this. Per Johanna, they would also need an updated COVID negative test and Pt would need to be stable off IV ativan. CAMRON paged Dr. Bruno re: need for COVID test and need to be off IV ativan.    CAMRON called La Nena again at 1430 to discuss the above and to discuss the visitor policy. CAMRON had to leave  and is currently awaiting return call.    Addendum @ [id number]: CAMRON received return call from Pt's dtr (La Nena). CAMRON was able to provide update that the Medicare Director is different from TCU to ARU, and [facility]. CAMRON was able to explain the visitor policy to La Nena and the 14 day quarantine requirement; La Nena was understanding of this requirement although disappointed. At this point, La Nena is in agreement with [facility] in agreement.    SW to f/u with Pt in the AM to confirm discharge plan. SW to continue to follow and assist with discharge plan as appropriate.     Flavia Martin, MSW, LICSW  6B  Intermediate Care Unit   [facility] [place]  Phone: [phone]  Pager: [phone]

## 2021-05-11 ENCOUNTER — APPOINTMENT (OUTPATIENT)
Dept: CT IMAGING | Facility: CLINIC | Age: 76
DRG: 177 | End: 2021-05-11
Attending: PEDIATRICS
Payer: MEDICARE

## 2021-05-11 ENCOUNTER — APPOINTMENT (OUTPATIENT)
Dept: NEUROLOGY | Facility: CLINIC | Age: 76
DRG: 177 | End: 2021-05-11
Attending: PEDIATRICS
Payer: MEDICARE

## 2021-05-11 ENCOUNTER — DOCUMENTATION ONLY (OUTPATIENT)
Dept: RADIATION ONCOLOGY | Facility: CLINIC | Age: 76
End: 2021-05-11

## 2021-05-11 LAB
ALBUMIN SERPL-MCNC: 2.8 G/DL (ref 3.4–5)
ALP SERPL-CCNC: 96 U/L (ref 40–150)
ALT SERPL W P-5'-P-CCNC: 42 U/L (ref 0–50)
ANION GAP SERPL CALCULATED.3IONS-SCNC: 6 MMOL/L (ref 3–14)
AST SERPL W P-5'-P-CCNC: 26 U/L (ref 0–45)
BILIRUB SERPL-MCNC: 0.5 MG/DL (ref 0.2–1.3)
BUN SERPL-MCNC: 33 MG/DL (ref 7–30)
CALCIUM SERPL-MCNC: 8.8 MG/DL (ref 8.5–10.1)
CHLORIDE SERPL-SCNC: 106 MMOL/L (ref 94–109)
CO2 SERPL-SCNC: 28 MMOL/L (ref 20–32)
CREAT SERPL-MCNC: 0.52 MG/DL (ref 0.52–1.04)
ERYTHROCYTE [DISTWIDTH] IN BLOOD BY AUTOMATED COUNT: 20.7 % (ref 10–15)
GFR SERPL CREATININE-BSD FRML MDRD: >90 ML/MIN/{1.73_M2}
GLUCOSE BLDC GLUCOMTR-MCNC: 117 MG/DL (ref 70–99)
GLUCOSE BLDC GLUCOMTR-MCNC: 128 MG/DL (ref 70–99)
GLUCOSE BLDC GLUCOMTR-MCNC: 129 MG/DL (ref 70–99)
GLUCOSE BLDC GLUCOMTR-MCNC: 131 MG/DL (ref 70–99)
GLUCOSE BLDC GLUCOMTR-MCNC: 207 MG/DL (ref 70–99)
GLUCOSE SERPL-MCNC: 92 MG/DL (ref 70–99)
HCT VFR BLD AUTO: 32 % (ref 35–47)
HGB BLD-MCNC: 9.8 G/DL (ref 11.7–15.7)
MAGNESIUM SERPL-MCNC: 2.1 MG/DL (ref 1.6–2.3)
MCH RBC QN AUTO: 29.7 PG (ref 26.5–33)
MCHC RBC AUTO-ENTMCNC: 30.6 G/DL (ref 31.5–36.5)
MCV RBC AUTO: 97 FL (ref 78–100)
PHOSPHATE SERPL-MCNC: 3.8 MG/DL (ref 2.5–4.5)
PLATELET # BLD AUTO: 204 10E9/L (ref 150–450)
POTASSIUM SERPL-SCNC: 3.4 MMOL/L (ref 3.4–5.3)
POTASSIUM SERPL-SCNC: 4 MMOL/L (ref 3.4–5.3)
PROT SERPL-MCNC: 5.6 G/DL (ref 6.8–8.8)
RBC # BLD AUTO: 3.3 10E12/L (ref 3.8–5.2)
SODIUM SERPL-SCNC: 141 MMOL/L (ref 133–144)
WBC # BLD AUTO: 8.1 10E9/L (ref 4–11)

## 2021-05-11 PROCEDURE — 70450 CT HEAD/BRAIN W/O DYE: CPT | Mod: ME

## 2021-05-11 PROCEDURE — 70496 CT ANGIOGRAPHY HEAD: CPT | Mod: MG

## 2021-05-11 PROCEDURE — 93010 ELECTROCARDIOGRAM REPORT: CPT | Performed by: INTERNAL MEDICINE

## 2021-05-11 PROCEDURE — 84132 ASSAY OF SERUM POTASSIUM: CPT | Performed by: INTERNAL MEDICINE

## 2021-05-11 PROCEDURE — 93005 ELECTROCARDIOGRAM TRACING: CPT

## 2021-05-11 PROCEDURE — 82962 GLUCOSE BLOOD TEST: CPT

## 2021-05-11 PROCEDURE — 250N000011 HC RX IP 250 OP 636: Performed by: STUDENT IN AN ORGANIZED HEALTH CARE EDUCATION/TRAINING PROGRAM

## 2021-05-11 PROCEDURE — 83735 ASSAY OF MAGNESIUM: CPT | Performed by: STUDENT IN AN ORGANIZED HEALTH CARE EDUCATION/TRAINING PROGRAM

## 2021-05-11 PROCEDURE — 99207 PR APP CREDIT; MD BILLING SHARED VISIT: CPT | Performed by: PHYSICIAN ASSISTANT

## 2021-05-11 PROCEDURE — 80053 COMPREHEN METABOLIC PANEL: CPT | Performed by: STUDENT IN AN ORGANIZED HEALTH CARE EDUCATION/TRAINING PROGRAM

## 2021-05-11 PROCEDURE — 250N000013 HC RX MED GY IP 250 OP 250 PS 637: Performed by: INTERNAL MEDICINE

## 2021-05-11 PROCEDURE — 250N000013 HC RX MED GY IP 250 OP 250 PS 637: Performed by: STUDENT IN AN ORGANIZED HEALTH CARE EDUCATION/TRAINING PROGRAM

## 2021-05-11 PROCEDURE — 250N000011 HC RX IP 250 OP 636: Performed by: INTERNAL MEDICINE

## 2021-05-11 PROCEDURE — 70498 CT ANGIOGRAPHY NECK: CPT | Mod: 26 | Performed by: RADIOLOGY

## 2021-05-11 PROCEDURE — 84100 ASSAY OF PHOSPHORUS: CPT | Performed by: STUDENT IN AN ORGANIZED HEALTH CARE EDUCATION/TRAINING PROGRAM

## 2021-05-11 PROCEDURE — 99222 1ST HOSP IP/OBS MODERATE 55: CPT | Mod: GC | Performed by: PSYCHIATRY & NEUROLOGY

## 2021-05-11 PROCEDURE — 999N000176 HC STATISTIC STROKE CODE W/O ACCESS

## 2021-05-11 PROCEDURE — 120N000005 HC R&B MS OVERFLOW UMMC

## 2021-05-11 PROCEDURE — 85027 COMPLETE CBC AUTOMATED: CPT | Performed by: STUDENT IN AN ORGANIZED HEALTH CARE EDUCATION/TRAINING PROGRAM

## 2021-05-11 PROCEDURE — G1004 CDSM NDSC: HCPCS | Performed by: RADIOLOGY

## 2021-05-11 PROCEDURE — 95720 EEG PHY/QHP EA INCR W/VEEG: CPT | Mod: GC | Performed by: PSYCHIATRY & NEUROLOGY

## 2021-05-11 PROCEDURE — 99233 SBSQ HOSP IP/OBS HIGH 50: CPT | Mod: GC | Performed by: PEDIATRICS

## 2021-05-11 PROCEDURE — 250N000011 HC RX IP 250 OP 636: Performed by: PEDIATRICS

## 2021-05-11 PROCEDURE — 70496 CT ANGIOGRAPHY HEAD: CPT | Mod: 26 | Performed by: RADIOLOGY

## 2021-05-11 PROCEDURE — 95714 VEEG EA 12-26 HR UNMNTR: CPT

## 2021-05-11 PROCEDURE — 250N000012 HC RX MED GY IP 250 OP 636 PS 637: Performed by: STUDENT IN AN ORGANIZED HEALTH CARE EDUCATION/TRAINING PROGRAM

## 2021-05-11 RX ORDER — IOPAMIDOL 755 MG/ML
75 INJECTION, SOLUTION INTRAVASCULAR ONCE
Status: COMPLETED | OUTPATIENT
Start: 2021-05-11 | End: 2021-05-11

## 2021-05-11 RX ORDER — POTASSIUM CHLORIDE 29.8 MG/ML
20 INJECTION INTRAVENOUS
Status: COMPLETED | OUTPATIENT
Start: 2021-05-11 | End: 2021-05-11

## 2021-05-11 RX ORDER — LEVETIRACETAM 750 MG/1
750 TABLET ORAL 2 TIMES DAILY
Status: DISCONTINUED | OUTPATIENT
Start: 2021-05-11 | End: 2021-05-15 | Stop reason: HOSPADM

## 2021-05-11 RX ORDER — LEVETIRACETAM 10 MG/ML
1000 INJECTION INTRAVASCULAR ONCE
Status: COMPLETED | OUTPATIENT
Start: 2021-05-11 | End: 2021-05-11

## 2021-05-11 RX ADMIN — FLUOXETINE HYDROCHLORIDE 60 MG: 40 CAPSULE ORAL at 08:18

## 2021-05-11 RX ADMIN — Medication 2.5 MG: at 14:39

## 2021-05-11 RX ADMIN — PANTOPRAZOLE SODIUM 40 MG: 40 TABLET, DELAYED RELEASE ORAL at 17:18

## 2021-05-11 RX ADMIN — ACETAMINOPHEN 975 MG: 325 TABLET, FILM COATED ORAL at 08:18

## 2021-05-11 RX ADMIN — MESALAMINE 1000 MG: 1000 SUPPOSITORY RECTAL at 21:42

## 2021-05-11 RX ADMIN — ACYCLOVIR: 50 OINTMENT TOPICAL at 20:32

## 2021-05-11 RX ADMIN — ENOXAPARIN SODIUM 40 MG: 100 INJECTION SUBCUTANEOUS at 17:18

## 2021-05-11 RX ADMIN — FUROSEMIDE 40 MG: 40 TABLET ORAL at 08:19

## 2021-05-11 RX ADMIN — POTASSIUM CHLORIDE 20 MEQ: 29.8 INJECTION, SOLUTION INTRAVENOUS at 10:51

## 2021-05-11 RX ADMIN — DOXEPIN HYDROCHLORIDE 3 MG: 10 SOLUTION ORAL at 21:42

## 2021-05-11 RX ADMIN — BUSPIRONE HYDROCHLORIDE 30 MG: 15 TABLET ORAL at 20:31

## 2021-05-11 RX ADMIN — LINACLOTIDE 290 MCG: 145 CAPSULE, GELATIN COATED ORAL at 08:19

## 2021-05-11 RX ADMIN — AMLODIPINE BESYLATE 10 MG: 10 TABLET ORAL at 08:19

## 2021-05-11 RX ADMIN — LORAZEPAM 0.5 MG: 0.5 TABLET ORAL at 08:56

## 2021-05-11 RX ADMIN — POLYETHYLENE GLYCOL 3350 17 G: 17 POWDER, FOR SOLUTION ORAL at 08:26

## 2021-05-11 RX ADMIN — Medication 2.5 MG: at 20:30

## 2021-05-11 RX ADMIN — PANTOPRAZOLE SODIUM 40 MG: 40 TABLET, DELAYED RELEASE ORAL at 08:18

## 2021-05-11 RX ADMIN — QUETIAPINE 25 MG: 25 TABLET ORAL at 17:18

## 2021-05-11 RX ADMIN — IOPAMIDOL 75 ML: 755 INJECTION, SOLUTION INTRAVENOUS at 09:39

## 2021-05-11 RX ADMIN — ACETAMINOPHEN 975 MG: 325 TABLET, FILM COATED ORAL at 20:30

## 2021-05-11 RX ADMIN — LORAZEPAM 0.5 MG: 0.5 TABLET ORAL at 21:42

## 2021-05-11 RX ADMIN — BUSPIRONE HYDROCHLORIDE 30 MG: 15 TABLET ORAL at 08:18

## 2021-05-11 RX ADMIN — LEVETIRACETAM 750 MG: 750 TABLET, FILM COATED ORAL at 20:30

## 2021-05-11 RX ADMIN — LEVETIRACETAM 250 MG: 250 TABLET, FILM COATED ORAL at 08:19

## 2021-05-11 RX ADMIN — DEXAMETHASONE 4 MG: 4 TABLET ORAL at 08:18

## 2021-05-11 RX ADMIN — QUETIAPINE 50 MG: 50 TABLET ORAL at 21:42

## 2021-05-11 RX ADMIN — QUETIAPINE 25 MG: 25 TABLET ORAL at 08:18

## 2021-05-11 RX ADMIN — Medication 2.5 MG: at 08:19

## 2021-05-11 RX ADMIN — LEVETIRACETAM 1000 MG: 10 INJECTION INTRAVENOUS at 10:46

## 2021-05-11 RX ADMIN — POTASSIUM CHLORIDE 20 MEQ: 29.8 INJECTION, SOLUTION INTRAVENOUS at 09:00

## 2021-05-11 RX ADMIN — SULFAMETHOXAZOLE AND TRIMETHOPRIM 1 TABLET: 400; 80 TABLET ORAL at 08:19

## 2021-05-11 ASSESSMENT — ACTIVITIES OF DAILY LIVING (ADL)
ADLS_ACUITY_SCORE: 18
ADLS_ACUITY_SCORE: 19
ADLS_ACUITY_SCORE: 18
ADLS_ACUITY_SCORE: 19
ADLS_ACUITY_SCORE: 18
ADLS_ACUITY_SCORE: 18

## 2021-05-11 NOTE — PROGRESS NOTES
Rapid Response Team Note    Assessment   In assessment a rapid response was called on Olga Bailey due to altered mental status. This presentation is likely due to  and worsened by     Plan   -  STAT head CT  - Stroke CODE in progress - Neurology team is handling work up of stroke  -  The Internal Medicine primary team was able to be reached and they are in agreement with the above plan.  -  Disposition: The patient for now we will remain on the until until work up is complete, may transfer to ICU if needed if acute stroke is determine  -  Reassessment and plan follow-up will be performed by the primary team      TERA Shepherd  [facility] RRT AMCOM Job Code Contact #[phone]    Hospital Course   Brief Summary of events leading to rapid response:   Olga Bailey is a 75 year old year old female with history of left frontoparietal glioblastoma s/p tumor resection 21. The plan was to undergo adjuvant chemoradiation, which has been held for prolonged admission at [facility] (3/26-21) with pneumonia and multiple DVTs, complicated by retroperitoneal hemorrhage and shock s/p IVC filter placement. She was transferred to [facility] on 21 for further Oncology and Radiation Oncology care. This morning, she abruptly stopped being able to answer questions appropriately and developed a right eye drooping and RRT and Stroke CODE were called simultaneously. Patient was evaluated at the bedside and is undergoing STAT head CT. We will continue to follow up on results - patient is stable to return to her heme/onc bed for now.       Admission Diagnosis:   Hypoxia [R09.02]     Physical Exam   Temp: 99  F (37.2  C) Temp  Min: 97.5  F (36.4  C)  Max: 99  F (37.2  C)  Resp: 18 Resp  Min: 16  Max: 18  SpO2: 97 % SpO2  Min: 94 %  Max: 98 %  Pulse: 103 Pulse  Min: 90  Max: 108    No data recorded  BP: 119/72 Systolic (24hrs), Av , Min:98 , Max:123   Diastolic (24hrs), Av, Min:60, Max:82     I/Os: I/O  last 3 completed shifts:  In: -   Out: 700 [Urine:700]     Exam:   General: acutely ill appearing  Mental Status: able to answer some basic questions about name, hospital correctly, frequently drifting off to sleep  Cardiac: tachycardic, regular rhythm, no appreciable murmurs, rubs or gallops  Lungs: breathing comfortably on room air, no adventitious sounds to bilateral auscultation  Neuro: CN III diminished on right eye and right cheek, maintains equal strength in bilateral upper and lower extremities at 4/5    Significant Results and Procedures   Lactic Acid:   Recent Labs   Lab Test 05/05/21  2221 05/05/21  1558 05/04/21  1202 05/03/21  1242 05/02/21  1015 04/28/21  1405 04/28/21  1405   LACT 1.6  --   --   --  1.6  --  2.1*   LACTS  --  2.3* 2.0 1.8  --    < >  --     < > = values in this interval not displayed.     CBC:   Recent Labs   Lab Test 05/11/21  0504 05/10/21  0617 05/09/21  0450   WBC 8.1 9.7 9.6   HGB 9.8* 10.5* 9.6*   HCT 32.0* 33.7* 30.9*    233 207        Sepsis Evaluation   The patient is not known to have an infection.  NO EVIDENCE OF SEPSIS at this time.  Vital sign, physical exam, and lab findings are due to altered mental status, rule out stroke.

## 2021-05-11 NOTE — PLAN OF CARE
OT/5C - Cancel. Per chart review, stroke code being called this AM after pt not responding to provider in room. Will reschedule.
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
## 2021-05-11 NOTE — PLAN OF CARE
BP 98/78 (BP Location: Left arm)   Pulse 104   Temp 98.5  F (36.9  C) (Oral)   Resp 18   Wt 67.1 kg (147 lb 14.9 oz)   SpO2 98%   BMI 26.20 kg/m      Afebrile, VSS on room air. Pt has sleep apnea, refused her CPAP and wore NC at 2LPM sating %. Denies pain/nausea/diarrhea/SOB/Chest pain. No skin issues, slight blanchable redness, pt on sides all night by herself with pillows. Meds crushed with apple sauce or with DD1 thickened liquid diet. A&Ox4, sometimes forgetful. Assist x2w/ walker and gait belt if up. Purewick is in place. Covid swab negative last night in prep for TCU discharge this afternoon. Has a fair appetite, BGs not requiring insulin. Given ativan for anxiety x1 at bedtime. Changed caps on R PICC. Potassium 3.4 this AM, pt has no iv pump and has trouble with pills. Will need 20mEq KCL x2 doses IV with a recheck 1hr after. No other complaints. Plan is to discharge this afternoon to TCU.    Problem: Adult Inpatient Plan of Care  Goal: Plan of Care Review  Outcome: No Change     Problem: Adult Inpatient Plan of Care  Goal: Patient-Specific Goal (Individualized)  Outcome: No Change     Problem: Adult Inpatient Plan of Care  Goal: Absence of Hospital-Acquired Illness or Injury  Outcome: No Change

## 2021-05-11 NOTE — PROGRESS NOTES
Radiation Oncology Note    I was paged by our radiation oncology therapists regarding Ms. Bailey presenting for radiation treatment with EEG leads on. Unfortunately, we cannot deliver radiation treatment with these in place. We will wait for workup of her mental status change to be completed and she will not receive radiation today. If EEG leads are off tomorrow and she has returned to baseline without additional concerns, tomorrow afternoon we can continue radiation therapy.    Please page with questions.     Betsy Spann MD, PhD  Department of Radiation Oncology  Pager: [phone]

## 2021-05-11 NOTE — PROGRESS NOTES
Social Work  Received handoff from Smith, [address] [facility], regarding pt's planned discharge to  TCU today. SW went to confirm plan with pt in her room, pt was not responding to RN's and a stroke code was ultimately called. Updated Jolene in TCU admissions. Will continue to follow for discharge placement.     MACARIO Maya, [facility]  [address]   [facility]  Phone: [phone]  Pager: [phone]

## 2021-05-11 NOTE — PROGRESS NOTES
Arrival to Stroke Code: 0908    Stroke Team /de-escalate interventions: 0948  /Bedside Nurse providing handoff: Flavia    Time left for CT: 0920  Time Returned to/Unit: 0935    /Bedside Nurse given handoff to & Time: 0945    Pt more likely to be post ictal.

## 2021-05-11 NOTE — PROGRESS NOTES
CLINICAL NUTRITION SERVICES - REASSESSMENT NOTE     Nutrition Prescription    RECOMMENDATIONS FOR MDs/PROVIDERS TO ORDER:  Diet resumption as medically appropriate    Malnutrition Status:    Non-severe malnutrition in the context of acute illness     Recommendations already ordered by Registered Dietitian (RD):  None at this time    Future/Additional Recommendations:  Resume magic cups/ensure when diet resumed     If enteral nutrition becomes plan of care: recommend starting Osmolite 1.5 @ 15 ml/hr and advance by 15 Ml q 4 hours to goal. Osmolite 1.5 Claudio @ goal of 45ml/hr  (1080ml/day)  will provide: 1620 kcals, 67 g PRO, 822 ml free H20, 219 g CHO, and 0 g fiber daily.     EVALUATION OF THE PROGRESS TOWARD GOALS   Diet: Dysphagia Level 1:  Pureed with Nectar Thickened liquids  --Ensure enlive @ 2PM and magic cups with dinner, additional ok PRN    Nutrition Support: None at this time  --Previously on TPN at [facility]  --Remained on TPN from 4/27-5/6 in house    Intake: Eating % intakes per food records  --Per [facility] records, pt is ordering well: average of 1970 kcal and 95 g protein  --Even if eating 75% of meals, pt is taking in >100% of estimated needs     Previous Calorie counts:  5/6         Total Kcals: 933       Total Protein: 40g -- although, is missing 75% of a meal that was not fully documented on calorie count sheet  5/5         Total Kcals: 1298     Total Protein: 46g  5/4         Total Kcals: 830       Total Protein: 25g    Three day average of: 1020 kcal (18 kcal/kg; 72% of needs) and 37g Protein (0.7 g/kg; 54% of needs) -- however does not include all intake    RN notes:  -5/11: fair appetite  -5/10: ate dinner, eating well  -5/9: good appetite     NEW FINDINGS   Code stroke called on pt - unable to see at this time  Weight: admit wt 75.1 kg (4/26) --> 76.7 kg (5/7) -->67.1 kg (5/10) -- 12.5% wt loss x 3 days however likely not all adipose/LBM as unlikely to lose 9.6 kg in 3 day duration -- noted  pt on diuretics and likely fluid shifts contributing role    Labs: BUN 33H, albumin 2.8L likely r/t illness/inflammation, bili/LFTs WNL,  5/10, Euglycemia     Meds: dexamethasone, lasix, novolog low insulin scale correction scale, miralax, KCL being replaced, bactrim    GI: daily BM    WOC: (5/10):   noted linear blanchable erythema on intergluteal cleft r/t moisture. Buttocks epidermis is intact.   Also noted BL cheeks with superficial skin tears, ~3.2 x 2.1 x <0.1cm.scattered scabbing to bilateral cheeks, remains not consistent with pressure    SLP: (4/28): Recommend the patient continue nectar thick full liquids. Pt currentlyl at high aspiration risk with even small amount of thin liquids via spoon. Pt should be fully awake, alert, and sitting upright with all PO.    MALNUTRITION  % Intake: Decreased intake does not meet criteria -- TPN to meet needs through 5/6  % Weight Loss: Weight loss does not meet criteria  Subcutaneous Fat Loss:Facial region: Mild (visual) --per RD visit 5/4  Muscle Loss: Upper leg (quadricep, hamstring):  mild and Posterior calf:  Mild (visual) -- per RD visit 5/4  Fluid Accumulation/Edema: Trace-Mild in LE  Malnutrition Diagnosis: Non-severe malnutrition in the context of acute illness  -- ongoing    Previous Goals   Total avg nutritional intake to meet a minimum of 15  kcal/kg and 0.7 g PRO/kg daily (per dosing wt 56.7 kg) - 60% of energy needs and 70% of protein needs via oral intake (to discontinue nutrition support)  Evaluation: Likely Met    Previous Nutrition Diagnosis  Inadequate oral intake related to prolonged NPO status and nutrition support as evidenced by intakes documented of applesauce and pudding   Evaluation: Improving    CURRENT NUTRITION DIAGNOSIS  Inadequate oral intake related to NPO s/p code stroke this morning as evidenced by unable to meet nutrient needs while NPO      INTERVENTIONS  Implementation  Recommendations per above    Goals  Diet adv v nutrition  support within 2-3 days.    Monitoring/Evaluation  Progress toward goals will be monitored and evaluated per protocol.    Estefania Berumen MS, RD, , CNSC, LD.  5C/BMT Pager:[phone]
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
## 2021-05-11 NOTE — PROVIDER NOTIFICATION
05/11/21 0900   Call Information   Date of Call 05/11/21   Time of Call 0907   Name of person requesting the team Flavia   Title of person requesting team RN   RRT Arrival time 0908   Time RRT ended 0948   Reason for call   Type of RRT Adult   Primary reason for call Neurological;Sudden or unanticipated change in patient condition   Neurological Acute change in LOC or neuro status;Confused;Lethargic;Seizure;Speech loss/slurred;Weakness   Was patient transferred from the ED, ICU, or PACU within last 24 hours prior to RRT call? No   SBAR   Situation acute MS change, Stroke team activated   Background Glioblastoma   Notable History/Conditions Cancer;Hypertension   Assessment pt slow to respond, MS changes, speech loss, change, tacycardic, sat 95% RA, confused   Interventions Blood glucose;Portable monitor;Other (describe)  (CT/CTA)   RRT Team   Attending/Primary/Covering Physician Maximilian 3   Date Attending Physician notified 05/30/21   Time Attending Physician notified 0908   Physician(s) Kaela Stanley PA-C, Neuro   Lead RN Sara Lynn   Post RRT Intervention Assessment   Post RRT Assessment Stable/Improved   Date Follow Up Done 05/11/21   Time Follow Up Done 1400

## 2021-05-11 NOTE — PROGRESS NOTES
[facility]    Progress Note - Maximilian 3 Service        Date of Admission:  4/26/2021    Assessment & Plan   Olga Bailey is a 75 y.o. F w/ GBM s/p resection (Feb 2021), depression/anxiety, HTN, asthma, and GERD, who was transferred to [facility] for consideration of inpt rad/onc treatment while she continues to be treated for complex presentation of acute hypoxic respiratory failure (due to possible PJP pneumonia and/or TRALI), 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 Stroke code called on 5/11, suspect seizure and being in pots-ictal state.    Today:  - Stroke code call, CTA negative, suspect post-ictal state  - Loaded levetiracetam  - Neurology consulted  - EEG monitoring in place    #Acute Encephalopathy  #C/F Seizure  Patient developed sudden change in mental status on 5/11 where there was decreased responsiveness, increased droop in right eye. Patient was seen 1 hr prior to event, no trauma. Concern immediately for stroke versus seizure. Stroke ode called after rapid response. CTA negative for acute bleed or vascular stenosis/obstruction. Neurology suspected post-ictal state following seizure.  - Neurology consulted  - Levetiracetam 1g load dose  - Levetiracetam trough in AM  - Continue levetiracetam  mg  - EEG     #Acute hypoxic respiratory failure   #Bilateral Pneumonia; presumed PJP s/p ABx treatment  #Concern for transfusion-related acute lung injury (TRALI)  #Concern for R hemidiaphragm paralysis  Dyspneic/hypoxic in late March for admission at OSH. Initially treated with ceftri/doxy. ID switched to course of Zosyn/doxy with Bactrim treatment dosing for suspected PJP (had been on steroids after admission for glioblastoma resection; Fungitell positive but Galactomannan negative). Unable to get adequate sputum samples for diagnosis of PJP. Improved with the Bactrim until several days after RP bleed (see below) and was c/f  TRALI as well. W/u for PE, COVID, and respiratory viral panel was negative. Bilateral lung opacities present. Concern for ongoing PJP infection (Bactrim was re-started), inflammatory lung condition (increased steroids), volume overload (diuresed for several days with IV Lasix). Imaging shows R hemidiaphragm elevation (concerning for paralysis?), and opacities appear to be improving as are oxygen requirements. Holding off on bronch per pulm recs and pt/family preference given likely need for intubation. Has had several RRTs for tachypnea and tachycardia, appear primarily related to anxiety. Improve with PRN anxiety medication and Bipap for comfort. Full work-up largely unremarkable. Slowly able to wean to RA by 5/8.    - Pulmonology consulted, following peripherally. Plan for F/U w/ Pulmonary in 2-3 months with repeat CT imaging, complete PFTs +/- six minute walk  - Furosemide PO 40mg QAM beginning 5/5 - goal net 0 to -500 ml daily  - pulm, ID previously consulted   - Steroids: from methylpred -> prednisone, discontinued 5/4   - PJP ppx: Bactrim single strength qday until 1.5 mo after steroids end (completed 3 week treatment course at OSH)  - PJP stain, PCR; conventional sputum culture ordered if patient can produce sputum   - Bipap when sleeping and PRN for comfort  - SLP consulted - DD1 and nectar thick liquids     Glioblastoma Multiforme  Left frontoparietal brain glioblastoma status post resection 2/23/2021. Seen by radiation oncology 3/24 recommended XRT and chemo radiation. However subsequently admitted for resp failure. MRI brain here signs of possible disease recurrence. Hem/onc concerned that she might not be a candidate for chemotherapy or radiation currently due to poor performance status.  -Rad/onc following; appreciate recs   -Radiation course initiated as inpatient 5/4  -Heme/onc consulted, appreciate recs   - Currently not a candidate for chemo due to poor functional status   - would consider  temozolomide at late date pending clinical course (needs to improve performance status)  - Levetiracetam 250mg BID    #Major depressive disorder, recurrent  #Anxiety  Follows with psychiatry and psychology as outpatint.  Saw health psychology during admission for GBM, made plan for outpatient follow-up and med adjustment,  but was unable.   - Psychiatry consulted, appreciate recs  - Palliative recs appreciated;  - Seroquel 25 mg BID + 50 mg at bedtime  - Ativan 0.5 mg q4h PRN - IV per pt request  - Scheduled doxepin PRN at night for sleep  - c/t PTA Buspar  - PTA prozac (dose reduced from 80 to 60 mg)  - Scheduled oxycodone TID + PRN for dyspnea  - Health psychology consult; appreciate recs     #Bilateral lower extremity DVT  #Right retroperitoneal hematoma   On 3/29 at outside hospital patient's O2 needs increased, duplex ultrasound revealed bilateral lower extremity DVT. CT PE negative for embolism.  Treated with IV heparin and transitioned to Lovenox 70 mg.  On 4/14 this was complicated by retroperitoneal bleed, requiring 4 units of packed red blood cells.  Lovenox was held, and IVC filter was placed on 4/16. Vascular surgery was involved, and a CT abdomen was stable bleed so no surgical intervention was required.  Eventually resumed on prophylactic 40mg of Lovenox twice daily.  -Continue 40 mg Lovenox qday  -hem/onc consulted; appreciate recs   - pain plan:              - Tylenol 975 mg q8h brianna              - oxycodone 2.5-5 mg q4h PRN for pain              - discontinued Dilaudid to avoid delirium  - Asymmetric foot swelling--LLE U/S 5/8, negative for new DVT, slightly improved chronic superficial thrombosis    #Ileus, resolved  Began after bleed at outside hospital 4/14; was on TPN for nutrition.  GI was consulted for assistance with bowel management.  With escalating bowel regimen finally had small BM 4/25 after tap water enema, had some liquid stool 4/27. AXR shows non-obstructive bowel gas  pattern.  -Continue prior Bowel regimen: linaclotide, mesalamine, MiraLAX, simethicone PRN     #Non-severe malnutrition on TPN, Improved  #Concern for refeeding syndrome  #Hypophosphatemia   Now eating regular diet with adequate calorie counts. 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 Urine sodium 50 while her sodium was 128, certainly consistent with this. Neurologic etiology with her GBM resection likely. Now normalized      #Steroid-induced hyperglycemia   Intermittently requiring insulin  -Low sliding scale insulin  -Gluc checks     #Hypertension  #Sinus tachycardia  Started on metoprolol at OSH for sinus tachycardia.  - c/t PTA amlodipine 10 mg qday    #Chronic medical problems:  - albuterol PRN  - BIPAP at bedtime (on CPAP at home) and for comfort  - GERD: back to PTA PO PPI    #Shock Liver, Resolved  LFTs sharply up after RP bleed at OSH, [place] due to shock liver. LFT's have steadily trended down since. Only AST remains elevated and trending down.      Diet: Snacks/Supplements Adult: Ensure Enlive; Between Meals  Snacks/Supplements Adult: Magic Cup; With Meals  NPO for Medical/Clinical Reasons Except for: No Exceptions    Fluids: None  Lines: PICC line  DVT Prophylaxis: Enoxaparin (Lovenox) SQ  Martino Catheter: not present  Code Status: Full Code         Disposition Plan   Expected discharge: 2 - 3 days, recommended to transitional care unit once respiratory status improves, plan for rehab and chemo/radiation in place .  Entered: Marvel Mcdowell MD 05/11/2021, 7:33 AM     Marvel Mcdowell MD  Internal Medicine, PGY-1  [facility] 3 Service  _______________________________________________________________________    Interval History    Nursing notes reviewed. Patient had sudden change in mental status with decrease arousal and inability to answer some questions. Stroke code call and neurology obtained head imaging that was negative for acute bleed .  Concern was for a post-ictal state.     Prior to the stroke code, the patient was feeling well. BMs more under control. Continues to be incontinent of urine.    4 Point ROS obtained and negative except as noted above.     Data reviewed today: I reviewed all medications, new labs and imaging results over the last 24 hours. I personally reviewed     Physical Exam   Vital Signs: Temp: 98.5  F (36.9  C) Temp src: Oral BP: 98/78 Pulse: 104   Resp: 18 SpO2: 98 % O2 Device: Nasal cannula Oxygen Delivery: 2 LPM  Weight: 147 lbs 14.86 oz   General Appearance: Somnolent, arousal with rubbing/calling name  Eyes: no scleral icterus. Right eyelid droop.   HEENT: neck supple, normocephalic. Dry appearing mucous membranes   Respiratory: No accessory muscle use, mildly course breath sounds in bilateral lung fields anteriorly. Breathing comfortably on room air.  Cardiovascular: regular rate rhythm, normal S1/S2, no murmurs appreciated, intact distal pulses  GI: soft, non-tender, non-distended  Skin: scattered bruises over upper exposed upper extremities, no rash noted.   Musculoskeletal: pedal edema in the left foot; no edema of right foot.  Neurologic:  Finger to nose coordination unable to complete. Unable to follow eyes to finger. Able to move extremities. Oriented to self and place only. Concern for left arm decerebrate posturing. Possible right arm pronator drift (decrease muscle strength at baseline on right).      Moves all extremities spontaneously, RUE mildly weaker (3/5) compared to LUE, follows commands.   Psychiatric: Pleasant affect, happy.     Data   Recent Labs   Lab 05/11/21  0504 05/10/21  0617 05/09/21  0450   WBC 8.1 9.7 9.6   HGB 9.8* 10.5* 9.6*   MCV 97 100 99    233 207   INR  --  0.98  --     141 141   POTASSIUM 3.4 3.4 3.5   CHLORIDE 106 106 107   CO2 28 28 28   BUN 33* 26 28   CR 0.52 0.48* 0.50*   ANIONGAP 6 7 5   ESTIVEN 8.8 9.2 8.9   GLC 92 93 84   ALBUMIN 2.8* 2.9* 2.7*   PROTTOTAL 5.6* 5.8*  5.5*   BILITOTAL 0.5 0.5 0.4   ALKPHOS 96 104 97   ALT 42 44 42   AST 26 27 27     Recent Results (from the past 24 hour(s))   CT Head w/o Contrast    Addendum: 5/11/2021    Addendum:  Stable right occipital calcified lesion, likely representing a  meningioma.    AVIS PALUMBO MD      Narrative    CT HEAD W/O CONTRAST 5/11/2021 9:52 AM    History: Neuro deficit, acute, stroke suspected; Mental status change,  unknown cause     Comparison: Head CT 5/2/2021    Technique: Using multidetector thin collimation helical acquisition  technique, axial, coronal and sagittal CT images from the skull base  to the vertex were obtained without intravenous contrast.   (topogram) image(s) also obtained and reviewed.    Findings:   Stable post surgical changes of left parietal craniotomy with  underlying encephalomalacia. No significant change in small amount of  extra-axial hypodense collection underneath the craniotomy.    There is no intracranial hemorrhage, mass effect, or midline shift. No  new areas of gray-white matter differentiation loss. Extensive  hypodensities involving the periventricular, deep and subcortical  white matter of both cerebral hemispheres. Ventricles are  proportionate to the cerebral sulci. The basal cisterns are clear.    The bony calvaria and the bones of the skull base are normal. The  visualized portions of the paranasal sinuses and mastoid air cells are  clear.      Impression    Impression:  1. No acute intracranial pathology.   2. Stable postsurgical changes of left parietal craniotomy with  underlying resection cavity.  3. Extensive hypodensities in cerebral white matter, representing  chronic small vessel ischemic disease, treatment related or  combination of both.    AVIS PALUMBO MD   CTA Head Neck with Contrast    Narrative    CTA  HEAD NECK WITH CONTRAST 5/11/2021 9:52 AM    CT angiogram of the neck   CT angiogram of the base of the brain with contrast  Reconstruction by the  Radiologist on the 3D workstation    Provided History:  Code Stroke to evaluate for potential thrombolysis  and thrombectomy.  PLEASE READ IMMEDIATELY.    Comparison:  Head CT same day and 5/2/2021.      Technique:  HEAD and NECK CTA: During rapid bolus intravenous injection of  nonionic contrast material, axial images were obtained using thin  collimation multidetector helical technique from the base of the upper  aortic arch through the [place] of [place]. This CT angiogram data was  reconstructed at thin intervals with mild overlap. Images were sent to  the [employer] workstation, and 3D reconstructions were obtained. The  axial source images, multiplanar reformations, 3D reconstructions in  both maximum intensity projection display and volume rendered models  were reviewed, with reconstructions performed by the technologist and  the radiologist.    Contrast: iopamidol (ISOVUE-370) solution 75 mL    Findings:    Head CTA demonstrates no aneurysm or stenosis of the major  intracranial arteries.    Neck CTA demonstrates no stenosis of the major cervical arteries. The  origins of the great vessels from the aortic arch are patent. The  normal distal right internal carotid artery measures 5 mm. The normal  distal left internal carotid artery measures 5 mm. The left vertebral  artery terminates as PICA.    No mass within the visualized portions of the cervical soft tissues or  lung apices.     Postsurgical changes of instrumented anterior spinal fusion at C4-C6  with complete osseous fusion. 4 mm anterolisthesis at C3-C4.      Impression    Impression:    1. Head CTA demonstrates no aneurysm or stenosis of the major  intracranial arteries.   2. Neck CTA demonstrates no stenosis of the major cervical arteries.    AVIS PALUMBO MD

## 2021-05-12 ENCOUNTER — APPOINTMENT (OUTPATIENT)
Dept: GENERAL RADIOLOGY | Facility: CLINIC | Age: 76
DRG: 177 | End: 2021-05-12
Attending: STUDENT IN AN ORGANIZED HEALTH CARE EDUCATION/TRAINING PROGRAM
Payer: MEDICARE

## 2021-05-12 ENCOUNTER — APPOINTMENT (OUTPATIENT)
Dept: NEUROLOGY | Facility: CLINIC | Age: 76
DRG: 177 | End: 2021-05-12
Attending: PEDIATRICS
Payer: MEDICARE

## 2021-05-12 ENCOUNTER — ALLIED HEALTH/NURSE VISIT (OUTPATIENT)
Dept: RADIATION ONCOLOGY | Facility: CLINIC | Age: 76
End: 2021-05-12
Attending: STUDENT IN AN ORGANIZED HEALTH CARE EDUCATION/TRAINING PROGRAM
Payer: MEDICARE

## 2021-05-12 ENCOUNTER — APPOINTMENT (OUTPATIENT)
Dept: PHYSICAL THERAPY | Facility: CLINIC | Age: 76
DRG: 177 | End: 2021-05-12
Attending: STUDENT IN AN ORGANIZED HEALTH CARE EDUCATION/TRAINING PROGRAM
Payer: MEDICARE

## 2021-05-12 LAB
ALBUMIN SERPL-MCNC: 2.8 G/DL (ref 3.4–5)
ALP SERPL-CCNC: 96 U/L (ref 40–150)
ALT SERPL W P-5'-P-CCNC: 37 U/L (ref 0–50)
ANION GAP SERPL CALCULATED.3IONS-SCNC: 4 MMOL/L (ref 3–14)
AST SERPL W P-5'-P-CCNC: 18 U/L (ref 0–45)
BILIRUB SERPL-MCNC: 0.5 MG/DL (ref 0.2–1.3)
BUN SERPL-MCNC: 29 MG/DL (ref 7–30)
CALCIUM SERPL-MCNC: 8.8 MG/DL (ref 8.5–10.1)
CHLORIDE SERPL-SCNC: 109 MMOL/L (ref 94–109)
CO2 SERPL-SCNC: 28 MMOL/L (ref 20–32)
CREAT SERPL-MCNC: 0.5 MG/DL (ref 0.52–1.04)
ERYTHROCYTE [DISTWIDTH] IN BLOOD BY AUTOMATED COUNT: 20 % (ref 10–15)
GFR SERPL CREATININE-BSD FRML MDRD: >90 ML/MIN/{1.73_M2}
GLUCOSE BLDC GLUCOMTR-MCNC: 113 MG/DL (ref 70–99)
GLUCOSE BLDC GLUCOMTR-MCNC: 164 MG/DL (ref 70–99)
GLUCOSE BLDC GLUCOMTR-MCNC: 221 MG/DL (ref 70–99)
GLUCOSE SERPL-MCNC: 102 MG/DL (ref 70–99)
HCT VFR BLD AUTO: 31.2 % (ref 35–47)
HGB BLD-MCNC: 9.8 G/DL (ref 11.7–15.7)
INTERPRETATION ECG - MUSE: NORMAL
MAGNESIUM SERPL-MCNC: 2.2 MG/DL (ref 1.6–2.3)
MCH RBC QN AUTO: 30.6 PG (ref 26.5–33)
MCHC RBC AUTO-ENTMCNC: 31.4 G/DL (ref 31.5–36.5)
MCV RBC AUTO: 98 FL (ref 78–100)
PHOSPHATE SERPL-MCNC: 3.1 MG/DL (ref 2.5–4.5)
PLATELET # BLD AUTO: 219 10E9/L (ref 150–450)
POTASSIUM SERPL-SCNC: 3.6 MMOL/L (ref 3.4–5.3)
PROT SERPL-MCNC: 5.4 G/DL (ref 6.8–8.8)
RBC # BLD AUTO: 3.2 10E12/L (ref 3.8–5.2)
SODIUM SERPL-SCNC: 141 MMOL/L (ref 133–144)
TROPONIN I SERPL-MCNC: 0.03 UG/L (ref 0–0.04)
WBC # BLD AUTO: 9.6 10E9/L (ref 4–11)

## 2021-05-12 PROCEDURE — 250N000013 HC RX MED GY IP 250 OP 250 PS 637: Performed by: STUDENT IN AN ORGANIZED HEALTH CARE EDUCATION/TRAINING PROGRAM

## 2021-05-12 PROCEDURE — 71045 X-RAY EXAM CHEST 1 VIEW: CPT | Mod: 26 | Performed by: RADIOLOGY

## 2021-05-12 PROCEDURE — 85027 COMPLETE CBC AUTOMATED: CPT | Performed by: STUDENT IN AN ORGANIZED HEALTH CARE EDUCATION/TRAINING PROGRAM

## 2021-05-12 PROCEDURE — 71045 X-RAY EXAM CHEST 1 VIEW: CPT

## 2021-05-12 PROCEDURE — 99232 SBSQ HOSP IP/OBS MODERATE 35: CPT | Performed by: INTERNAL MEDICINE

## 2021-05-12 PROCEDURE — 120N000005 HC R&B MS OVERFLOW UMMC

## 2021-05-12 PROCEDURE — 90832 PSYTX W PT 30 MINUTES: CPT | Performed by: PSYCHOLOGIST

## 2021-05-12 PROCEDURE — 80053 COMPREHEN METABOLIC PANEL: CPT | Performed by: STUDENT IN AN ORGANIZED HEALTH CARE EDUCATION/TRAINING PROGRAM

## 2021-05-12 PROCEDURE — 93010 ELECTROCARDIOGRAM REPORT: CPT | Performed by: INTERNAL MEDICINE

## 2021-05-12 PROCEDURE — 82962 GLUCOSE BLOOD TEST: CPT

## 2021-05-12 PROCEDURE — 99232 SBSQ HOSP IP/OBS MODERATE 35: CPT | Mod: GC | Performed by: PEDIATRICS

## 2021-05-12 PROCEDURE — 250N000013 HC RX MED GY IP 250 OP 250 PS 637: Performed by: INTERNAL MEDICINE

## 2021-05-12 PROCEDURE — 250N000012 HC RX MED GY IP 250 OP 636 PS 637: Performed by: STUDENT IN AN ORGANIZED HEALTH CARE EDUCATION/TRAINING PROGRAM

## 2021-05-12 PROCEDURE — 97530 THERAPEUTIC ACTIVITIES: CPT | Mod: GP | Performed by: PHYSICAL THERAPIST

## 2021-05-12 PROCEDURE — 95711 VEEG 2-12 HR UNMONITORED: CPT

## 2021-05-12 PROCEDURE — 84100 ASSAY OF PHOSPHORUS: CPT | Performed by: STUDENT IN AN ORGANIZED HEALTH CARE EDUCATION/TRAINING PROGRAM

## 2021-05-12 PROCEDURE — 95718 EEG PHYS/QHP 2-12 HR W/VEEG: CPT | Performed by: PSYCHIATRY & NEUROLOGY

## 2021-05-12 PROCEDURE — 250N000011 HC RX IP 250 OP 636: Performed by: STUDENT IN AN ORGANIZED HEALTH CARE EDUCATION/TRAINING PROGRAM

## 2021-05-12 PROCEDURE — 84484 ASSAY OF TROPONIN QUANT: CPT | Performed by: STUDENT IN AN ORGANIZED HEALTH CARE EDUCATION/TRAINING PROGRAM

## 2021-05-12 PROCEDURE — 83735 ASSAY OF MAGNESIUM: CPT | Performed by: STUDENT IN AN ORGANIZED HEALTH CARE EDUCATION/TRAINING PROGRAM

## 2021-05-12 PROCEDURE — 93005 ELECTROCARDIOGRAM TRACING: CPT

## 2021-05-12 PROCEDURE — 99231 SBSQ HOSP IP/OBS SF/LOW 25: CPT | Mod: GC | Performed by: STUDENT IN AN ORGANIZED HEALTH CARE EDUCATION/TRAINING PROGRAM

## 2021-05-12 PROCEDURE — 77386 HC IMRT TREATMENT DELIVERY, COMPLEX: CPT | Performed by: STUDENT IN AN ORGANIZED HEALTH CARE EDUCATION/TRAINING PROGRAM

## 2021-05-12 RX ADMIN — LINACLOTIDE 290 MCG: 145 CAPSULE, GELATIN COATED ORAL at 08:09

## 2021-05-12 RX ADMIN — QUETIAPINE 50 MG: 50 TABLET ORAL at 20:53

## 2021-05-12 RX ADMIN — LEVETIRACETAM 750 MG: 750 TABLET, FILM COATED ORAL at 20:49

## 2021-05-12 RX ADMIN — ACYCLOVIR: 50 OINTMENT TOPICAL at 21:04

## 2021-05-12 RX ADMIN — MESALAMINE 1000 MG: 1000 SUPPOSITORY RECTAL at 20:53

## 2021-05-12 RX ADMIN — PANTOPRAZOLE SODIUM 40 MG: 40 TABLET, DELAYED RELEASE ORAL at 08:04

## 2021-05-12 RX ADMIN — DEXAMETHASONE 4 MG: 4 TABLET ORAL at 08:04

## 2021-05-12 RX ADMIN — ACETAMINOPHEN 975 MG: 325 TABLET, FILM COATED ORAL at 08:04

## 2021-05-12 RX ADMIN — Medication 2.5 MG: at 20:49

## 2021-05-12 RX ADMIN — INSULIN ASPART 1 UNITS: 100 INJECTION, SOLUTION INTRAVENOUS; SUBCUTANEOUS at 18:25

## 2021-05-12 RX ADMIN — Medication 2.5 MG: at 08:08

## 2021-05-12 RX ADMIN — PANTOPRAZOLE SODIUM 40 MG: 40 TABLET, DELAYED RELEASE ORAL at 17:30

## 2021-05-12 RX ADMIN — SULFAMETHOXAZOLE AND TRIMETHOPRIM 1 TABLET: 400; 80 TABLET ORAL at 08:05

## 2021-05-12 RX ADMIN — ACETAMINOPHEN 975 MG: 325 TABLET, FILM COATED ORAL at 20:48

## 2021-05-12 RX ADMIN — FLUOXETINE HYDROCHLORIDE 60 MG: 40 CAPSULE ORAL at 08:04

## 2021-05-12 RX ADMIN — ENOXAPARIN SODIUM 40 MG: 100 INJECTION SUBCUTANEOUS at 17:30

## 2021-05-12 RX ADMIN — BUSPIRONE HYDROCHLORIDE 30 MG: 15 TABLET ORAL at 20:49

## 2021-05-12 RX ADMIN — Medication 2.5 MG: at 13:44

## 2021-05-12 RX ADMIN — POLYETHYLENE GLYCOL 3350 17 G: 17 POWDER, FOR SOLUTION ORAL at 08:04

## 2021-05-12 RX ADMIN — DOXEPIN HYDROCHLORIDE 3 MG: 10 SOLUTION ORAL at 20:53

## 2021-05-12 RX ADMIN — FUROSEMIDE 40 MG: 40 TABLET ORAL at 08:05

## 2021-05-12 RX ADMIN — QUETIAPINE 25 MG: 25 TABLET ORAL at 08:05

## 2021-05-12 RX ADMIN — LORAZEPAM 0.5 MG: 0.5 TABLET ORAL at 10:02

## 2021-05-12 RX ADMIN — ACETAMINOPHEN 975 MG: 325 TABLET, FILM COATED ORAL at 13:44

## 2021-05-12 RX ADMIN — BUSPIRONE HYDROCHLORIDE 30 MG: 15 TABLET ORAL at 08:09

## 2021-05-12 RX ADMIN — AMLODIPINE BESYLATE 10 MG: 10 TABLET ORAL at 08:05

## 2021-05-12 RX ADMIN — LEVETIRACETAM 750 MG: 750 TABLET, FILM COATED ORAL at 08:09

## 2021-05-12 RX ADMIN — QUETIAPINE 25 MG: 25 TABLET ORAL at 17:30

## 2021-05-12 ASSESSMENT — ACTIVITIES OF DAILY LIVING (ADL)
ADLS_ACUITY_SCORE: 18
ADLS_ACUITY_SCORE: 19
ADLS_ACUITY_SCORE: 18
ADLS_ACUITY_SCORE: 18

## 2021-05-12 NOTE — PROGRESS NOTES
Hematology / Oncology  Daily Progress Note   Date of Service: 05/12/2021  Patient: Olga Bailey  MRN: [id number]  Admission Date: 4/26/2021  Hospital Day # 16  Cancer Diagnosis: Glioblastoma (IDH wild type, MGMT methylated)   Primary Outpatient Oncologist: Dr. Baker  Current Treatment Plan: S/p tumor resection 2/23/21. Day 1 of 15 fractions = 5/4/21.     Assessment & Plan:   Olga Bailey is a 75 year old year old female with history of left frontoparietal glioblastoma s/p tumor resection 2/23/21. The plan was to undergo adjuvant chemoradiation, which has been held for prolonged admission at [facility] (3/26-4/26/21) with pneumonia and multiple DVTs, complicated by retroperitoneal hemorrhage and shock s/p IVC filter placement. She was transferred to [facility] on 4/26/21 for further Oncology and Radiation Oncology care.            Recommendations:  - Today is day 6 of 15 XRT. Continue Dexamethasone 4 mg daily for course of treatment.  - Follow up scheduled with Dr. Baker after completion of radiation on 5/25.  - Continue PT/OT; recommendation for TCU on discharge. Discussed with patient today that she will have to go to  TCU in order to continue ongoing XRT, ending  5/24. After completion of radiation could then transfer to TCU closer to home. Discussed with unit SW to share with patients daughter.        # Glioblastoma Multiforme  Follows with Dr. Baker. For further detail of oncologic history, please see below. In summary, patient was diagnosed with a left frontoparietal brain glioblastoma s/p resection 2/23/2021. Seen by Dr. Baker on 3/23, recommended temozolomide and radiation however treatment has not been initiated yet due to prolonged admission at Replaced by [facility] with pneumonia, B/l LE DVTs, and RP hemorrhage. Ultimately transferred to [facility] where she could receive radiation treatment as appropriate.    - In conjunction with radiation, plan for Dexamethasone 4 mg daily until XRT completed   -  Care conference held 5/3 with primary team, palliative care, heme/onc team, SW and patient's family. Updated on current status/hospitalization and proposed treatment plan of 15 fractions over 15 weekdays (Day 1 inpatient 5/4).   - Temodar to be considered with improvement in performance status. Follow up with Dr. Baker to discuss Temodar after completion of radiation scheduled 5/25.      # Recurrent Seizure   Episodes of reduced consciousness 5/11. Stroke code called and CTA negative. Neuro consulted, thought to be recurrent seizure. Given Keppra load and dose increased. EEG overnight negative for recurrent seizure activity. No new weakness, tremor or changes in sensation. Afebrile and HD stable.    - Keppra 750 mg BID    - If right sided weakness persists or worsens beyond 5 days, would consider brain MRI    # Bilateral LE DVTs  # Retroperitoneal bleed  B/l LE DVTs noted on 3/29/21.   - Started therapeutic anticoagulation. C/b right retroperitoneal bleed 4/14/21 and subsequently developed acute hypoxic respiratory failure.   - IVC filter placed with IR on 4/16/21.   - Bilateral LE ultrasound showed improvement of previously diagnosed DVTs, though not resolution  - keep IVC filter, ok to continue ppx lovenox     We will continue to follow this patient. Please do not hesitate to page with any questions or concerns.     Patient was seen and plan of care was discussed with attending physician Dr. Babcock.     Alice Allan (Dinh, VERENA    Hematology/Oncology   Pager: [phone]      Oncologic History:  - 2/2021 PRESENTATION: Progressive aphasia.   - 2/19/2021 MR brain imaging with 3.3 x 2.8 x 2.8 cm enhancing mass in left frontal-parietal region. A second contrast enhancing lesion (0.5 x 1.0 x 1.0 cm) in right occipital lobe which is dural based and largely stable in size since 9/2011; likely representing a meningioma.  - 2/23/2021 SURGERY: Gross total resection by Dr. Cummings  PATHOLOGY: Glioblastoma;  IDH1-R132H wild-type/ IDH 1 and 2 wildtype, MGMT promoter methylated. Not BRAF mutated.   - 3/23/2021 Established care with Dr. Baker. Plan was to initiate chemotherapy with temozolomide 75mg/m2 (140mg) daily in the evening until completion of radiation. Temozolomide held for admission to [facility]. Adjuvant radiation was planned at [facility], held for dyspnea and subsequently admitted to [facility] with acute hypoxic respiratory failure and pneumonia.   ___________________________________________________________________    Subjective & Interval History:    No acute events noted overnight, slept much better. Ongoing weakness, making progress with PT daily slowly. Tolerating XRT well, plan for day 6/15 today after missing yesterdays due to EEG leads. Fair appetite. No issues with bowels or bladder. Open to  TCU once acute issues resolve.     Physical Exam:    Blood pressure 121/81, pulse 105, temperature 97.9  F (36.6  C), temperature source Oral, resp. rate 16, weight 67.1 kg (147 lb 14.9 oz), SpO2 96 %.    Constitutional: Pleasant female seen sitting up in chair. Pleasant, conversational. Non- toxic appearing. No acute distress.   HEENT: Normocephalic, atraumatic. Sclerae anicteric. EOM intact.   Respiratory: Breathing comfortably on room air. Lungs CTAB.   Cardiovascular: RRR, no murmurs   Skin: Facial xerosis. No jaundice appreciated.   Musculoskeletal/ Extremities: No redness, warmth, or swelling of the joints appreciated.   Neurologic: Alert and oriented. Speech normal. Grossly nonfocal. Memory and thought process preserved.   Neuropsychiatric: Calm, affect congruent to situation. Appropriate mood and affect. Good judgment and insight. No visual/auditory hallucinations.     Labs & Studies: I personally reviewed the following studies:  ROUTINE LABS (Last four results):  [facility]  Recent Labs   Lab 05/12/21  0435 05/11/21  1448 05/11/21  0504 05/10/21  0617 05/09/21  0450     --  141 141 141   POTASSIUM  3.6 4.0 3.4 3.4 3.5   CHLORIDE 109  --  106 106 107   CO2 28  --  28 28 28   ANIONGAP 4  --  6 7 5   *  --  92 93 84   BUN 29  --  33* 26 28   CR 0.50*  --  0.52 0.48* 0.50*   GFRESTIMATED >90  --  >90 >90 >90   GFRESTBLACK >90  --  >90 >90 >90   ESTIVEN 8.8  --  8.8 9.2 8.9   MAG 2.2  --  2.1 2.2 2.1   PHOS 3.1  --  3.8 3.4 3.9   PROTTOTAL 5.4*  --  5.6* 5.8* 5.5*   ALBUMIN 2.8*  --  2.8* 2.9* 2.7*   BILITOTAL 0.5  --  0.5 0.5 0.4   ALKPHOS 96  --  96 104 97   AST 18  --  26 27 27   ALT 37  --  42 44 42     CBC  Recent Labs   Lab 05/12/21  0435 05/11/21  0504 05/10/21  0617 05/09/21  0450   WBC 9.6 8.1 9.7 9.6   RBC 3.20* 3.30* 3.38* 3.13*   HGB 9.8* 9.8* 10.5* 9.6*   HCT 31.2* 32.0* 33.7* 30.9*   MCV 98 97 100 99   MCH 30.6 29.7 31.1 30.7   MCHC 31.4* 30.6* 31.2* 31.1*   RDW 20.0* 20.7* 20.8* 21.0*    204 233 207     INR  Recent Labs   Lab 05/10/21  0617   INR 0.98       Medications list for reference:  Current Facility-Administered Medications   Medication     acetaminophen (TYLENOL) tablet 975 mg     acyclovir (ZOVIRAX) 5 % ointment     albuterol (PROAIR HFA/PROVENTIL HFA/VENTOLIN HFA) 108 (90 Base) MCG/ACT inhaler 2 puff     amLODIPine (NORVASC) tablet 10 mg     bisacodyl (DULCOLAX) Suppository 10 mg     busPIRone (BUSPAR) tablet 30 mg     carboxymethylcellulose PF (REFRESH PLUS) 0.5 % ophthalmic solution 1 drop     dexamethasone (DECADRON) tablet 4 mg     dextrose 10% infusion     glucose gel 15-30 g    Or     dextrose 50 % injection 25-50 mL    Or     glucagon injection 1 mg     doxepin (SINEquan) 10 MG/ML (HIGH CONC) solution 3 mg     enoxaparin ANTICOAGULANT (LOVENOX) injection 40 mg     FLUoxetine (PROzac) capsule 60 mg     furosemide (LASIX) tablet 40 mg     insulin aspart (NovoLOG) injection (RAPID ACTING)     insulin aspart (NovoLOG) injection (RAPID ACTING)     levETIRAcetam (KEPPRA) tablet 750 mg     lidocaine (LMX4) cream     lidocaine 1 % 0.1-1 mL     linaclotide (LINZESS) capsule 290 mcg      LORazepam (ATIVAN) injection 0.5 mg     LORazepam (ATIVAN) tablet 0.5 mg     melatonin tablet 6 mg     mesalamine (CANASA) Suppository 1,000 mg     naloxone (NARCAN) injection 0.2 mg    Or     naloxone (NARCAN) injection 0.4 mg    Or     naloxone (NARCAN) injection 0.2 mg    Or     naloxone (NARCAN) injection 0.4 mg     ondansetron (ZOFRAN) injection 4 mg     oxyCODONE IR (ROXICODONE) half-tab 2.5 mg     oxyCODONE IR (ROXICODONE) half-tab 2.5-5 mg     pantoprazole (PROTONIX) EC tablet 40 mg     polyethylene glycol (MIRALAX) Packet 17 g     prochlorperazine (COMPAZINE) injection 5 mg     QUEtiapine (SEROquel) tablet 25 mg     QUEtiapine (SEROquel) tablet 25 mg     QUEtiapine (SEROquel) tablet 50 mg     sennosides (SENOKOT) tablet 8.6 mg     simethicone (MYLICON) suspension 40 mg     sodium chloride (PF) 0.9% PF flush 10 mL     sodium chloride (PF) 0.9% PF flush 10 mL     sodium chloride (PF) 0.9% PF flush 10 mL     sodium chloride (PF) 0.9% PF flush 10 mL     sulfamethoxazole-trimethoprim (BACTRIM) 400-80 MG per tablet 1 tablet

## 2021-05-12 NOTE — PLAN OF CARE
ST session cancelled. Attempted to see pt for dysphagia tx, however pt off unit for radiation at time of attempt. RN reports good tolerance of current diet level. Will follow up as appropriate.

## 2021-05-12 NOTE — PROGRESS NOTES
[facility]  Neurology Progress Note    Patient Name:  Olga Bailey    MRN:  [id number]      :  1945  Date of Service:  2021  Primary care provider:  Edd, Enrique Swann      Subjective:  No acute events overnight. Ms. Bailey reports feeling less foggy this morning, and that her mentation is much clearer than yesterday. Her memory from yesterday is blurry but she is aware and understanding of situation. She is not having headaches, changes in vision, new tremor or shaking, new weakness, or changes in sensation. No fevers, chills, n/v, or changes in mood.     ASSESSMENT/PLAN:  Olga Bailey is a 75 year old woman with history of glioblastoma multiforme s/p resection 2021 now on radiation therapy, depression/anxiety, and HTN who was transferred to [facility] from [facility] on 21 for inpatient radiation treatment. Neurology consulted on  for potential seizure.     # Episode of reduced consciousness, likely seizure  # GBM s/p resection, now on radiation therapy  Ms. Bailey had an episode of reduced consciousness morning of . Was unresponsive to staff and noted to have right face droop/twitching. Stroke work-up negative. Obtunded after event for 1-2 hours, but alert and conversational by mid-afternoon. She has had right sided weakness of her arm and leg, with right pronator drift. Most likely represents seizure, given post-ictal period and symptoms correlating to location of GBM and radiation therapy. EEG overnight  without seizure activity.  If weakness persists beyond 5 days could consider repeating MRI brain w/wo. No new episodes of reduced consciousness, changes in weakness, or new shaking/jerks. Discussed side effects of Keppra including changes in mood, instructed her to reach out if her depression worsens on her new Keppra dose.    - Discontinue EEG   - Continue Keppra 750 mg BID   - If right sided weakness persists or worsens beyond 5  days, would consider brain MRI   - Follow-up with Dr. Baker as outpatient   - Neurology will sign off. Please reach out with new questions or concerns.      Patient discussed with Attending Dr. Patricio.    Frankie Manuel, MS4    I was present with the medical student who participated in the service and in the documentation of the note. I have verified the history and personally performed the physical exam and medical decision making. I agree with the assessment and plan of care as documented in the note.    Jayson Solomon MD/PhD  [facility] Neurology  [phone]      ______________________________  PHYSICAL EXAMINATION:   /63 (BP Location: Left arm)   Pulse 99   Temp 97.8  F (36.6  C) (Oral)   Resp 16   Wt 67.1 kg (147 lb 14.9 oz)   SpO2 97%   BMI 26.20 kg/m      General: Awake, alert, interactive, comfortable in bed  HEENT: Normocephalic, atraumatic, MMM  Resp: Breathing comfortably on room air  Abdomen: Soft, non-distended, non-tender  Extremities: Warm and well perfused, no peripheral edema  Skin: Not jaundiced, no rash, no ecchymoses  Psych: Normal mood and affect  Neuro:  Mental status: Awake, attentive, interactive; oriented to person and place, but not time. Able to spell WORLD forward and backward, able to identify how many quarters in $1.25  Speech/Language: Fluent, comprehension intact, no dysarthria  Cranial nerves: Visual fields intact, Eyes conjugate, Pupils 4 mm and brisk, EOMI w/ normal and smooth pursuit, face expression symmetric, facial sensation intact and symmetric, hearing intact to conversation, shoulder shrug strong, palate rise symmetric, tongue midline.  Motor: No atrophy, essential tremor of both hands. Tone is appropriate throughout. Right arm strength 5/5 and left arm 4/5 strength at shoulder abductor, elbow flexion, wrist flexion extension. Lower extremities 4/5 to knee flex/ext, dorsi/plantar flexion.   Reflexes: 2+ and symmetric biceps, brachioradialis,  and achilles. No spread. Toes down-going.  Sensory: Intact to LT, No lateral extinction   Gait: Deferred      IMAGING:  MR Brain (4/26)  Postsurgical changes of left parietal craniotomy and mass  resection. There is peripheral T1 hyperintensity of the resection  cavity with focal nodule of the superior resection cavity measuring  1.0 cm, which may represent disease recurrence. Stable 1.1 cm T1  hyperintense lesion along the right occipital convexity and in the  anterior right lateral ventricle. Limited imaging primarily for the  purposes of stereotactic localization.       CT Head & Neck w/ contrast (5/11/21)  1. Head CTA demonstrates no aneurysm or stenosis of the major  intracranial arteries.   2. Neck CTA demonstrates no stenosis of the major cervical arteries      CURRENT MEDICATIONS:    acetaminophen  975 mg Oral TID     acyclovir   Topical Q3H     amLODIPine  10 mg Oral Daily     busPIRone  30 mg Oral BID     dexamethasone  4 mg Oral Daily     doxepin  3 mg Oral At Bedtime     enoxaparin ANTICOAGULANT  40 mg Subcutaneous Q24H     FLUoxetine  60 mg Oral Daily     furosemide  40 mg Oral Daily     insulin aspart  1-3 Units Subcutaneous TID AC     insulin aspart  1-3 Units Subcutaneous At Bedtime     levETIRAcetam  750 mg Oral BID     linaclotide  290 mcg Oral QAM AC     mesalamine  1,000 mg Rectal At Bedtime     oxyCODONE  2.5 mg Oral TID     pantoprazole  40 mg Oral BID AC     polyethylene glycol  17 g Oral Daily     QUEtiapine  25 mg Oral BID     QUEtiapine  50 mg Oral At Bedtime     sodium chloride (PF)  10 mL Intracatheter Q8H     sodium chloride (PF)  10 mL Intracatheter Q8H     sodium chloride (PF)  10 mL Intracatheter Q8H     sulfamethoxazole-trimethoprim  1 tablet Oral Daily       PRN MEDICATIONS:  albuterol, bisacodyl, carboxymethylcellulose PF, dextrose, glucose **OR** dextrose **OR** glucagon, lidocaine 4%, lidocaine (buffered or not buffered), LORazepam, LORazepam, melatonin, naloxone **OR** naloxone  **OR** naloxone **OR** naloxone, ondansetron, oxyCODONE, prochlorperazine, QUEtiapine, sennosides, simethicone, sodium chloride (PF)    ALLERGIES:  Allergies   Allergen Reactions     Bacitracin Rash     Bactroban is effective; No difficulties     Erythromycin      intolerant.     Meperidine Hcl      intolerant only   Demerol     Chloraprep One Step Rash

## 2021-05-12 NOTE — PLAN OF CARE
/68 (BP Location: Left arm)   Pulse 109   Temp 98.3  F (36.8  C) (Oral)   Resp 16   Wt 67.1 kg (147 lb 14.9 oz)   SpO2 97%   BMI 26.20 kg/m    2735-6665: VSS. Denies pain. Alert and oriented. EEG leads off now. BG at dinner 220. Waiting for insulin pen. Up to chair x2. Had small nose bleed, lasted less than 5 minutes on right nostril. Appetite great. Daughter able to visit today. Purewick in place. No other concerns.   Problem: Adult Inpatient Plan of Care  Goal: Absence of Hospital-Acquired Illness or Injury  Outcome: No Change     Problem: Adult Inpatient Plan of Care  Goal: Optimal Comfort and Wellbeing  Outcome: No Change     Problem: Adult Inpatient Plan of Care  Goal: Readiness for Transition of Care  Outcome: No Change

## 2021-05-12 NOTE — PLAN OF CARE
/64 (BP Location: Left arm)   Pulse 85   Temp 97.7  F (36.5  C) (Oral)   Resp 16   Wt 67.1 kg (147 lb 14.9 oz)   SpO2 98%   BMI 26.20 kg/m      Afebrile, VSS on room air while awake. Pt on 2L NC for sleep apnea overnight. Denies pain/nausea/diarrhea. EEG monitoring continues. No new seizure activity noted throughout shift. A&Ox4, forgetful at times. Pt is more sedated overnight with taking ativan and Seroquel at bedtime but when awoken is able to respond appropriately and open both eyes when doing so. Pt has baseline right sided weakness/slight drooping which isn't new. Had 1 medium soft formed BM last night. Purewick in place. Voiding small amounts or urine, but she is also not drinking much. DD1 nectar thick diet. BGs q4hrs, no correction needed. No other complaints or replacements needed, continue to monitor per POC.    Problem: Adult Inpatient Plan of Care  Goal: Plan of Care Review  Outcome: No Change

## 2021-05-12 NOTE — CONSULTS
"  Health Psychology                      Ruth Kelsey, Ph.D., L.P [phone]  Bina Florence, Ph.D., L.P. [phone]  Chaya Burks, Ph.D. [phone]  Rena Del Castillo, Ph.D., L.P. [phone]  Moose Marie, Ph.D., A.B.P.P., L.P. [phone]         Maribel Grier, Ph.D., L.P. [phone]     [facility], [address]rd Floor  [address] [place] [address]    Inpatient Health Psychology Consultation    SUBJECTIVE:    Olga Bailey is a 75 year old female female with recent diagnosis of glioblastoma status post resection with prior history of depression and anxiety.  She was seen for health psychology follow-up.  Overall she described the last week as a \"roller coaster\".  However she is feels well supported by family and friends.  She continues to discuss emotional challenges related to supporting her daughter and other children through her cancer diagnosis.  Processed her emotions and concerns related to caring for family members.  She endorsed that overall her anxiety has been better managed.  She does have a concern about the financial implications of her cancer treatment and has yet to speak with her  about her financial standing.  She questions how much financial responsibility she will have for her medical treatment and this provides anxiety.  Recommended conversations with social work to potentially address this concern and she agreed this would be helpful.      OBJECTIVE:  Appearance/Behavior/Orientation: Alert and oriented to place. Stated year was 1992. No evidence of psychomotor agitation.    Cooperation/Reliability: Patient appeared to honestly respond to questions about psychosocial functioning and is deemed a reliable historian.   Cognition/Memory/Judgment:Not formally assessed.  Speech/Language: Speech was clear, logical and coherent, of normal rate, rhythm and volume.   Thought Content/Form: Appropriate to interview and situation. " "Overall logical and organized.   Mood/Affect:  Mood \"better\"; affect was mood congruent.       ASSESSMENT:    Anxiety is overall better managed although she does express ongoing concerns around the emotional impact of her cancer diagnosis on her family.  Benefits from supportive interventions regarding this concern.    DIAGNOSIS:  296.31 (F33.0) Major Depressive Disorder, Recurrent Episode, Mild   300.01 (F41.0) Panic Disorder  300.02 (F41.1) Generalized Anxiety Disorder      PLAN:  Plan for health psychology to follow this patient approximately once per week for the duration of their hospitalization.     It is recommended that she address her financial concerns with social work to see if they could help her provide information relevant to this question.  I reached out to her  on her treatment team who said she would speak to her about this question.    It is also recommended that she consider meeting with health psychology upon to [facility].    Please feel free to page if urgent concerns arise prior to the next follow-up session.     Rena Del Castillo, PhD,   Clinical Health Psychologist      Start: 2:55p  Stop: 3:20p            "

## 2021-05-12 NOTE — PROGRESS NOTES
[facility]    Progress Note - Maximilian 3 Service        Date of Admission:  4/26/2021    Assessment & Plan   Olga Bailey is a 75 y.o. F w/ GBM s/p resection (Feb 2021), depression/anxiety, HTN, asthma, and GERD, who was transferred to [facility] for consideration of inpt rad/onc treatment while she continues to be treated for complex presentation of acute hypoxic respiratory failure (due to possible PJP pneumonia and/or TRALI), 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 Stroke code called on 5/11, suspect seizure and being in pots-ictal state.    Today:  - Episode of agitation, increased RR - imrpoved with lorazepam  - CXR, ECG, and Trop negative    #Acute Encephalopathy  #C/F Seizure  Patient developed sudden change in mental status on 5/11 where there was decreased responsiveness, increased droop in right eye. Patient was seen 1 hr prior to event, no trauma. Concern immediately for stroke versus seizure. Stroke ode called after rapid response. CTA negative for acute bleed or vascular stenosis/obstruction. Neurology suspected post-ictal state following seizure.   - Neurology consulted  - Levetiracetam 750 mg BID     #Acute hypoxic respiratory failure   #Bilateral Pneumonia; presumed PJP s/p ABx treatment  #Concern for transfusion-related acute lung injury (TRALI)  #Concern for R hemidiaphragm paralysis  Dyspneic/hypoxic in late March for admission at OSH. Initially treated with ceftri/doxy. ID switched to course of Zosyn/doxy with Bactrim treatment dosing for suspected PJP (had been on steroids after admission for glioblastoma resection; Fungitell positive but Galactomannan negative). Unable to get adequate sputum samples for diagnosis of PJP. Improved with the Bactrim until several days after RP bleed (see below) and was c/f TRALI as well. W/u for PE, COVID, and respiratory viral panel was negative. Bilateral lung opacities present.  Concern for ongoing PJP infection (Bactrim was re-started), inflammatory lung condition (increased steroids), volume overload (diuresed for several days with IV Lasix). Imaging shows R hemidiaphragm elevation (concerning for paralysis?), and opacities appear to be improving as are oxygen requirements. Holding off on bronch per pulm recs and pt/family preference given likely need for intubation. Has had several RRTs for tachypnea and tachycardia, appear primarily related to anxiety. Improve with PRN anxiety medication and Bipap for comfort. Full work-up largely unremarkable. Slowly able to wean to RA by 5/8.    - Pulmonology consulted, following peripherally. Plan for F/U w/ Pulmonary in 2-3 months with repeat CT imaging, complete PFTs +/- six minute walk  - Furosemide PO 40mg QAM beginning 5/5 - goal net 0 to -500 ml daily  - pulm, ID previously consulted   - Steroids: from methylpred -> prednisone, discontinued 5/4   - PJP ppx: Bactrim single strength qday until 1.5 mo after steroids end (completed 3 week treatment course at OSH)  - PJP stain, PCR; conventional sputum culture ordered if patient can produce sputum   - Bipap when sleeping and PRN for comfort  - SLP consulted - DD1 and nectar thick liquids     Glioblastoma Multiforme  Left frontoparietal brain glioblastoma status post resection 2/23/2021. Seen by radiation oncology 3/24 recommended XRT and chemo radiation. However subsequently admitted for resp failure. MRI brain here signs of possible disease recurrence. Hem/onc concerned that she might not be a candidate for chemotherapy or radiation currently due to poor performance status.  -Rad/onc following; appreciate recs   -Radiation course initiated as inpatient 5/4  -Heme/onc consulted, appreciate recs   - Currently not a candidate for chemo due to poor functional status   - would consider temozolomide at late date pending clinical course (needs to improve performance status)  - Levetiracetam 250mg  BID    #Major depressive disorder, recurrent  #Anxiety  Follows with psychiatry and psychology as outpatint.  Saw health psychology during admission for GBM, made plan for outpatient follow-up and med adjustment,  but was unable.   - Psychiatry consulted, appreciate recs  - Palliative recs appreciated;  - Seroquel 25 mg BID + 50 mg at bedtime  - Ativan 0.5 mg q4h PRN - IV per pt request  - Scheduled doxepin PRN at night for sleep  - c/t PTA Buspar  - PTA prozac (dose reduced from 80 to 60 mg)  - Scheduled oxycodone TID + PRN for dyspnea  - Health psychology consult; appreciate recs     #Bilateral lower extremity DVT  #Right retroperitoneal hematoma   On 3/29 at outside hospital patient's O2 needs increased, duplex ultrasound revealed bilateral lower extremity DVT. CT PE negative for embolism.  Treated with IV heparin and transitioned to Lovenox 70 mg.  On 4/14 this was complicated by retroperitoneal bleed, requiring 4 units of packed red blood cells.  Lovenox was held, and IVC filter was placed on 4/16. Vascular surgery was involved, and a CT abdomen was stable bleed so no surgical intervention was required.  Eventually resumed on prophylactic 40mg of Lovenox twice daily.  -Continue 40 mg Lovenox qday  -hem/onc consulted; appreciate recs   - pain plan:              - Tylenol 975 mg q8h brianna              - oxycodone 2.5-5 mg q4h PRN for pain              - discontinued Dilaudid to avoid delirium  - Asymmetric foot swelling--LLE U/S 5/8, negative for new DVT, slightly improved chronic superficial thrombosis    #Ileus, resolved  Began after bleed at outside hospital 4/14; was on TPN for nutrition.  GI was consulted for assistance with bowel management.  With escalating bowel regimen finally had small BM 4/25 after tap water enema, had some liquid stool 4/27. AXR shows non-obstructive bowel gas pattern.  -Continue prior Bowel regimen: linaclotide, mesalamine, MiraLAX, simethicone PRN     #Non-severe malnutrition on TPN,  Improved  #Concern for refeeding syndrome  #Hypophosphatemia   Now eating regular diet with adequate calorie counts. 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 Urine sodium 50 while her sodium was 128, certainly consistent with this. Neurologic etiology with her GBM resection likely. Now normalized      #Steroid-induced hyperglycemia   Intermittently requiring insulin  -Low sliding scale insulin  -Gluc checks     #Hypertension  #Sinus tachycardia  Started on metoprolol at OSH for sinus tachycardia.  - c/t PTA amlodipine 10 mg qday    #Chronic medical problems:  - albuterol PRN  - BIPAP at bedtime (on CPAP at home) and for comfort  - GERD: back to PTA PO PPI    #Shock Liver, Resolved  LFTs sharply up after RP bleed at OSH, [place] due to shock liver. LFT's have steadily trended down since. Only AST remains elevated and trending down.      Diet: Snacks/Supplements Adult: Ensure Enlive; Between Meals  Snacks/Supplements Adult: Magic Cup; With Meals  Combination Diet Dysphagia Diet Level 1: Pureed; Nectar Thickened Liquids (pre-thickened or use instant food thickener)    Fluids: None  Lines: PICC line  DVT Prophylaxis: Enoxaparin (Lovenox) SQ  Martino Catheter: not present  Code Status: Full Code         Disposition Plan   Expected discharge: 2 - 3 days, recommended to transitional care unit once respiratory status improves, plan for rehab and chemo/radiation in place .  Entered: Marvel Mcdowell MD 05/12/2021, 7:29 AM     Marvel Mcdowell MD  Internal Medicine, PGY-1  [facility] 3 Service  _______________________________________________________________________    Interval History    Care team notes reviewed. Patient had episode this morning of increased dyspnea while oxygenating well. CXR, ECG, and trop negative. Improved with lorazepam, suspect this was anxiety as opposed to MI vs PE/[place]. After lorazepam, patient doing well, no additional dyspnea. No fevers,  chills, sweats.    4 Point ROS obtained and negative except as noted above.     Data reviewed today: I reviewed all medications, new labs and imaging results over the last 24 hours. I personally reviewed     Physical Exam   Vital Signs: Temp: 97.7  F (36.5  C) Temp src: Oral BP: 120/64 Pulse: 85   Resp: 16 SpO2: 98 % O2 Device: Nasal cannula Oxygen Delivery: 2 LPM  Weight: 147 lbs 14.86 oz   General Appearance: Somnolent, arousal with rubbing/calling name  Eyes: no scleral icterus.   HEENT: neck supple, normocephalic. Dry appearing mucous membranes   Respiratory: No accessory muscle use, mildly course breath sounds in bilateral lung fields anteriorly. Breathing comfortably on room air.  Cardiovascular: regular rate rhythm, normal S1/S2, no murmurs appreciated, intact distal pulses  GI: soft, non-tender, non-distended  Skin: scattered bruises over upper exposed upper extremities, no rash noted.   Musculoskeletal: pedal edema in the left foot; no edema of right foot.  Neurologic: Oriented x 3, motor strength on right 3/5, left 4/5. No new neural deficits.  Psychiatric: Pleasant affect.     Data   Recent Labs   Lab 05/12/21  0435 05/11/21  1448 05/11/21  0504 05/10/21  0617   WBC 9.6  --  8.1 9.7   HGB 9.8*  --  9.8* 10.5*   MCV 98  --  97 100     --  204 233   INR  --   --   --  0.98     --  141 141   POTASSIUM 3.6 4.0 3.4 3.4   CHLORIDE 109  --  106 106   CO2 28  --  28 28   BUN 29  --  33* 26   CR 0.50*  --  0.52 0.48*   ANIONGAP 4  --  6 7   ESTIVEN 8.8  --  8.8 9.2   *  --  92 93   ALBUMIN 2.8*  --  2.8* 2.9*   PROTTOTAL 5.4*  --  5.6* 5.8*   BILITOTAL 0.5  --  0.5 0.5   ALKPHOS 96  --  96 104   ALT 37  --  42 44   AST 18  --  26 27     Recent Results (from the past 24 hour(s))   CT Head w/o Contrast    Addendum: 5/11/2021    Addendum:  Stable right occipital calcified lesion, likely representing a  meningioma.    AVIS PALUMBO MD      Narrative    CT HEAD W/O CONTRAST 5/11/2021 9:52  AM    History: Neuro deficit, acute, stroke suspected; Mental status change,  unknown cause     Comparison: Head CT 5/2/2021    Technique: Using multidetector thin collimation helical acquisition  technique, axial, coronal and sagittal CT images from the skull base  to the vertex were obtained without intravenous contrast.   (topogram) image(s) also obtained and reviewed.    Findings:   Stable post surgical changes of left parietal craniotomy with  underlying encephalomalacia. No significant change in small amount of  extra-axial hypodense collection underneath the craniotomy.    There is no intracranial hemorrhage, mass effect, or midline shift. No  new areas of gray-white matter differentiation loss. Extensive  hypodensities involving the periventricular, deep and subcortical  white matter of both cerebral hemispheres. Ventricles are  proportionate to the cerebral sulci. The basal cisterns are clear.    The bony calvaria and the bones of the skull base are normal. The  visualized portions of the paranasal sinuses and mastoid air cells are  clear.      Impression    Impression:  1. No acute intracranial pathology.   2. Stable postsurgical changes of left parietal craniotomy with  underlying resection cavity.  3. Extensive hypodensities in cerebral white matter, representing  chronic small vessel ischemic disease, treatment related or  combination of both.    AVIS PALUMBO MD   CTA Head Neck with Contrast    Narrative    CTA  HEAD NECK WITH CONTRAST 5/11/2021 9:52 AM    CT angiogram of the neck   CT angiogram of the base of the brain with contrast  Reconstruction by the Radiologist on the 3D workstation    Provided History:  Code Stroke to evaluate for potential thrombolysis  and thrombectomy.  PLEASE READ IMMEDIATELY.    Comparison:  Head CT same day and 5/2/2021.      Technique:  HEAD and NECK CTA: During rapid bolus intravenous injection of  nonionic contrast material, axial images were obtained using  thin  collimation multidetector helical technique from the base of the upper  aortic arch through the [place] of [place]. This CT angiogram data was  reconstructed at thin intervals with mild overlap. Images were sent to  the [employer] workstation, and 3D reconstructions were obtained. The  axial source images, multiplanar reformations, 3D reconstructions in  both maximum intensity projection display and volume rendered models  were reviewed, with reconstructions performed by the technologist and  the radiologist.    Contrast: iopamidol (ISOVUE-370) solution 75 mL    Findings:    Head CTA demonstrates no aneurysm or stenosis of the major  intracranial arteries.    Neck CTA demonstrates no stenosis of the major cervical arteries. The  origins of the great vessels from the aortic arch are patent. The  normal distal right internal carotid artery measures 5 mm. The normal  distal left internal carotid artery measures 5 mm. The left vertebral  artery terminates as PICA.    No mass within the visualized portions of the cervical soft tissues or  lung apices.     Postsurgical changes of instrumented anterior spinal fusion at C4-C6  with complete osseous fusion. 4 mm anterolisthesis at C3-C4.      Impression    Impression:    1. Head CTA demonstrates no aneurysm or stenosis of the major  intracranial arteries.   2. Neck CTA demonstrates no stenosis of the major cervical arteries.    AVIS PALUMBO MD

## 2021-05-13 ENCOUNTER — APPOINTMENT (OUTPATIENT)
Dept: PHYSICAL THERAPY | Facility: CLINIC | Age: 76
DRG: 177 | End: 2021-05-13
Attending: STUDENT IN AN ORGANIZED HEALTH CARE EDUCATION/TRAINING PROGRAM
Payer: MEDICARE

## 2021-05-13 ENCOUNTER — APPOINTMENT (OUTPATIENT)
Dept: RADIATION ONCOLOGY | Facility: CLINIC | Age: 76
End: 2021-05-13
Attending: STUDENT IN AN ORGANIZED HEALTH CARE EDUCATION/TRAINING PROGRAM
Payer: MEDICARE

## 2021-05-13 DIAGNOSIS — C71.9 GLIOBLASTOMA (H): Primary | ICD-10-CM

## 2021-05-13 LAB
ALBUMIN SERPL-MCNC: 2.8 G/DL (ref 3.4–5)
ALP SERPL-CCNC: 88 U/L (ref 40–150)
ALT SERPL W P-5'-P-CCNC: 35 U/L (ref 0–50)
ANION GAP SERPL CALCULATED.3IONS-SCNC: 2 MMOL/L (ref 3–14)
AST SERPL W P-5'-P-CCNC: 22 U/L (ref 0–45)
BILIRUB SERPL-MCNC: 0.4 MG/DL (ref 0.2–1.3)
BUN SERPL-MCNC: 32 MG/DL (ref 7–30)
CALCIUM SERPL-MCNC: 8.7 MG/DL (ref 8.5–10.1)
CHLORIDE SERPL-SCNC: 110 MMOL/L (ref 94–109)
CO2 SERPL-SCNC: 31 MMOL/L (ref 20–32)
CREAT SERPL-MCNC: 0.53 MG/DL (ref 0.52–1.04)
ERYTHROCYTE [DISTWIDTH] IN BLOOD BY AUTOMATED COUNT: 20.1 % (ref 10–15)
GFR SERPL CREATININE-BSD FRML MDRD: >90 ML/MIN/{1.73_M2}
GLUCOSE BLDC GLUCOMTR-MCNC: 144 MG/DL (ref 70–99)
GLUCOSE BLDC GLUCOMTR-MCNC: 154 MG/DL (ref 70–99)
GLUCOSE BLDC GLUCOMTR-MCNC: 191 MG/DL (ref 70–99)
GLUCOSE BLDC GLUCOMTR-MCNC: 232 MG/DL (ref 70–99)
GLUCOSE SERPL-MCNC: 105 MG/DL (ref 70–99)
HCT VFR BLD AUTO: 29.5 % (ref 35–47)
HGB BLD-MCNC: 9.1 G/DL (ref 11.7–15.7)
INTERPRETATION ECG - MUSE: NORMAL
MAGNESIUM SERPL-MCNC: 2 MG/DL (ref 1.6–2.3)
MCH RBC QN AUTO: 29.6 PG (ref 26.5–33)
MCHC RBC AUTO-ENTMCNC: 30.8 G/DL (ref 31.5–36.5)
MCV RBC AUTO: 96 FL (ref 78–100)
PHOSPHATE SERPL-MCNC: 2.6 MG/DL (ref 2.5–4.5)
PLATELET # BLD AUTO: 228 10E9/L (ref 150–450)
POTASSIUM SERPL-SCNC: 3.6 MMOL/L (ref 3.4–5.3)
PROT SERPL-MCNC: 5.6 G/DL (ref 6.8–8.8)
RBC # BLD AUTO: 3.07 10E12/L (ref 3.8–5.2)
SODIUM SERPL-SCNC: 143 MMOL/L (ref 133–144)
WBC # BLD AUTO: 7.9 10E9/L (ref 4–11)

## 2021-05-13 PROCEDURE — 80053 COMPREHEN METABOLIC PANEL: CPT | Performed by: STUDENT IN AN ORGANIZED HEALTH CARE EDUCATION/TRAINING PROGRAM

## 2021-05-13 PROCEDURE — 258N000003 HC RX IP 258 OP 636: Performed by: INTERNAL MEDICINE

## 2021-05-13 PROCEDURE — 77386 HC IMRT TREATMENT DELIVERY, COMPLEX: CPT | Performed by: STUDENT IN AN ORGANIZED HEALTH CARE EDUCATION/TRAINING PROGRAM

## 2021-05-13 PROCEDURE — 84100 ASSAY OF PHOSPHORUS: CPT | Performed by: STUDENT IN AN ORGANIZED HEALTH CARE EDUCATION/TRAINING PROGRAM

## 2021-05-13 PROCEDURE — 250N000013 HC RX MED GY IP 250 OP 250 PS 637: Performed by: INTERNAL MEDICINE

## 2021-05-13 PROCEDURE — 97530 THERAPEUTIC ACTIVITIES: CPT | Mod: GP | Performed by: PHYSICAL THERAPIST

## 2021-05-13 PROCEDURE — 250N000011 HC RX IP 250 OP 636: Performed by: STUDENT IN AN ORGANIZED HEALTH CARE EDUCATION/TRAINING PROGRAM

## 2021-05-13 PROCEDURE — 250N000013 HC RX MED GY IP 250 OP 250 PS 637: Performed by: STUDENT IN AN ORGANIZED HEALTH CARE EDUCATION/TRAINING PROGRAM

## 2021-05-13 PROCEDURE — 250N000012 HC RX MED GY IP 250 OP 636 PS 637: Performed by: STUDENT IN AN ORGANIZED HEALTH CARE EDUCATION/TRAINING PROGRAM

## 2021-05-13 PROCEDURE — 99232 SBSQ HOSP IP/OBS MODERATE 35: CPT | Mod: GC | Performed by: PEDIATRICS

## 2021-05-13 PROCEDURE — 250N000009 HC RX 250: Performed by: INTERNAL MEDICINE

## 2021-05-13 PROCEDURE — 85027 COMPLETE CBC AUTOMATED: CPT | Performed by: STUDENT IN AN ORGANIZED HEALTH CARE EDUCATION/TRAINING PROGRAM

## 2021-05-13 PROCEDURE — 120N000005 HC R&B MS OVERFLOW UMMC

## 2021-05-13 PROCEDURE — 97116 GAIT TRAINING THERAPY: CPT | Mod: GP | Performed by: PHYSICAL THERAPIST

## 2021-05-13 PROCEDURE — 82962 GLUCOSE BLOOD TEST: CPT

## 2021-05-13 PROCEDURE — 83735 ASSAY OF MAGNESIUM: CPT | Performed by: STUDENT IN AN ORGANIZED HEALTH CARE EDUCATION/TRAINING PROGRAM

## 2021-05-13 RX ADMIN — DOXEPIN HYDROCHLORIDE 3 MG: 10 SOLUTION ORAL at 21:12

## 2021-05-13 RX ADMIN — LEVETIRACETAM 750 MG: 750 TABLET, FILM COATED ORAL at 20:21

## 2021-05-13 RX ADMIN — ACETAMINOPHEN 975 MG: 325 TABLET, FILM COATED ORAL at 15:08

## 2021-05-13 RX ADMIN — SODIUM PHOSPHATE, MONOBASIC, MONOHYDRATE 9 MMOL: 276; 142 INJECTION, SOLUTION INTRAVENOUS at 05:15

## 2021-05-13 RX ADMIN — LINACLOTIDE 290 MCG: 145 CAPSULE, GELATIN COATED ORAL at 08:55

## 2021-05-13 RX ADMIN — Medication 2.5 MG: at 15:07

## 2021-05-13 RX ADMIN — QUETIAPINE 25 MG: 25 TABLET ORAL at 08:56

## 2021-05-13 RX ADMIN — PANTOPRAZOLE SODIUM 40 MG: 40 TABLET, DELAYED RELEASE ORAL at 17:32

## 2021-05-13 RX ADMIN — QUETIAPINE 25 MG: 25 TABLET ORAL at 17:32

## 2021-05-13 RX ADMIN — ACETAMINOPHEN 975 MG: 325 TABLET, FILM COATED ORAL at 08:55

## 2021-05-13 RX ADMIN — AMLODIPINE BESYLATE 10 MG: 10 TABLET ORAL at 08:56

## 2021-05-13 RX ADMIN — INSULIN ASPART 1 UNITS: 100 INJECTION, SOLUTION INTRAVENOUS; SUBCUTANEOUS at 17:25

## 2021-05-13 RX ADMIN — FLUOXETINE HYDROCHLORIDE 60 MG: 40 CAPSULE ORAL at 08:56

## 2021-05-13 RX ADMIN — Medication 2.5 MG: at 08:55

## 2021-05-13 RX ADMIN — BUSPIRONE HYDROCHLORIDE 30 MG: 15 TABLET ORAL at 20:21

## 2021-05-13 RX ADMIN — BUSPIRONE HYDROCHLORIDE 30 MG: 15 TABLET ORAL at 08:55

## 2021-05-13 RX ADMIN — DEXAMETHASONE 4 MG: 4 TABLET ORAL at 08:55

## 2021-05-13 RX ADMIN — QUETIAPINE 50 MG: 50 TABLET ORAL at 21:11

## 2021-05-13 RX ADMIN — PANTOPRAZOLE SODIUM 40 MG: 40 TABLET, DELAYED RELEASE ORAL at 08:55

## 2021-05-13 RX ADMIN — MESALAMINE 1000 MG: 1000 SUPPOSITORY RECTAL at 21:12

## 2021-05-13 RX ADMIN — Medication 2.5 MG: at 20:21

## 2021-05-13 RX ADMIN — LEVETIRACETAM 750 MG: 750 TABLET, FILM COATED ORAL at 08:56

## 2021-05-13 RX ADMIN — FUROSEMIDE 40 MG: 40 TABLET ORAL at 08:56

## 2021-05-13 RX ADMIN — ACETAMINOPHEN 975 MG: 325 TABLET, FILM COATED ORAL at 20:20

## 2021-05-13 RX ADMIN — POLYETHYLENE GLYCOL 3350 17 G: 17 POWDER, FOR SOLUTION ORAL at 12:35

## 2021-05-13 RX ADMIN — ENOXAPARIN SODIUM 40 MG: 100 INJECTION SUBCUTANEOUS at 17:32

## 2021-05-13 RX ADMIN — SULFAMETHOXAZOLE AND TRIMETHOPRIM 1 TABLET: 400; 80 TABLET ORAL at 08:55

## 2021-05-13 ASSESSMENT — ACTIVITIES OF DAILY LIVING (ADL)
ADLS_ACUITY_SCORE: 18

## 2021-05-13 NOTE — PROGRESS NOTES
[facility] PHYSICIANS  Radiation Oncology    On Treatment Visit Note      Olga Bailey      Date: 2021   MRN: [id number]   : 1945  Diagnosis: glioblastoma   Attending: Betsy Spann MD, PhD    Reason for Visit:  On Treatment Visit     Treatment Summary to Date  Treatment Site: Brain Current Dose: 1869/4005 cGy Fractions: 7/15      Chemotherapy  Chemo concurrent with radx?: No    ED Visit/Hosiptal Admission: She remains inpatient during radiation. She is planned to be discharged to a TCU and continue RT as an outpt.      Unplanned treatment Breaks: She missed 1 fraction on 2021 due to need for EEG monitoring after she had a sudden change in mental status . We were unable to treat her with EEG in place.    Subjective: Ms. Bailey is doing better. She is weaned off oxygen, and is clinically stable for TCU disposition. She appears dysthymic this morning.     On decadron 4 mg daily per inpatient team.     Nursing ROS:   Nutrition Alteration  Diet Type: Dietary Supplement(Dyphagia Diet 1, TPN, and suppliments)  Anorexia NCI: 0 - None  Nutrition Note: Dyphagia Diet 1, TPN, and suppliments  Skin  Skin Reaction: 0 - No changes  Skin Note: educated on side effects  CNS Alteration  CNS note: fatigue, weakness; questionable seizure on -EEG negative   ENT and Mouth Exam  Mucositis - Current: 0 - None   Mucositis - Internal Severity: 0 - None   Esophagitis: 0 - None  Cardiovascular  Respiratory effort: 2 - Mild dyspnea on exertion  Sputum: 0 - None  Cardio/Resp Note: admitted with aspiration pna, markedly improved this week  Gastrointestinal  Nausea: 0 - None  Vomitin - No vomiting (ons)  Diarrhea: 0 - None  Weight loss: 1 - < 10% from baseline  Constipation NCI: 1 - Occasional or intermittent s/sx  Genitourinary  Urinary Status: 0 - Normal  Dysuria: 0 - None  Psychosocial  Mood - Anxiety: 1 - Mild mood alteration  Mood - Depression: 1 - Mild mood alteration  Pyschosocial Note: With  flat affect during OTV, did not attempt to make eye contact with team during OTV  Pain Assessment  0-10 Pain Scale: 0    Objective:   There were no vitals taken for this visit.  Gen: Appears well, in no acute distress  Skin: No erythema  Cards: Warm, well perfused  Resp: breathing comfortably with a mask  Neuro: 5/5 all four extremities     Labs:  CBC RESULTS:   Recent Labs   Lab Test 05/13/21  0232   WBC 7.9   RBC 3.07*   HGB 9.1*   HCT 29.5*   MCV 96   MCH 29.6   MCHC 30.8*   RDW 20.1*        ELECTROLYTES:  Recent Labs   Lab Test 05/13/21 0232      POTASSIUM 3.6   CHLORIDE 110*   ESTIVEN 8.7   CO2 31   BUN 32*   CR 0.53   *       Assessment:    Reviewed goals of care. Continue active treatment.  Tolerating radiation therapy well.  All questions and concerns addressed.     Plan:   1. Continue current therapy.    2. Prophylactic steroids not needed for cranial-directed  radiation therapy. Recommend quick taper of decadron unless there is an additional indication for steroid use. Communicated with primary team prior to discharge. We will continue to monitor need for steroids as we see Ms. Bailey in the department for therapy.    3. Recommend administration of second COVID vaccine prior to discharge to TCU if no current medical contraindications given her high risk immunosuppressed status from cancer.     I called the patient's daughter and discussed the above at her request.     Mosaiq chart and setup information reviewed  MVCT/IGRT images checked    Medication Review  Med Note: Changes made by inpatient team, this week increase in Santo Spann MD, PhD     Department of Radiation Oncology  [facility]

## 2021-05-13 NOTE — LETTER
2021         RE: Olga Bailey  [address] [address]        Dear Colleague,    Thank you for referring your patient, Olga Bailey, to the [facility] RADIATION ONCOLOGY. Please see a copy of my visit note below.    [facility] PHYSICIANS  Radiation Oncology    On Treatment Visit Note      Olga Bailey      Date: 2021   MRN: [id number]   : 1945  Diagnosis: glioblastoma   Attending: Betsy Spann MD, PhD    Reason for Visit:  On Treatment Visit     Treatment Summary to Date  Treatment Site: Brain Current Dose: 1869/4005 cGy Fractions: 7/15      Chemotherapy  Chemo concurrent with radx?: No    ED Visit/Hosiptal Admission: She remains inpatient during radiation. She is planned to be discharged to a TCU and continue RT as an outpt.      Unplanned treatment Breaks: She missed 1 fraction on 2021 due to need for EEG monitoring after she had a sudden change in mental status . We were unable to treat her with EEG in place.    Subjective: Ms. Bailey is doing better. She is weaned off oxygen, and is clinically stable for TCU disposition. She appears dysthymic this morning.     On decadron 4 mg daily per inpatient team.     Nursing ROS:   Nutrition Alteration  Diet Type: Dietary Supplement(Dyphagia Diet 1, TPN, and suppliments)  Anorexia NCI: 0 - None  Nutrition Note: Dyphagia Diet 1, TPN, and suppliments  Skin  Skin Reaction: 0 - No changes  Skin Note: educated on side effects  CNS Alteration  CNS note: fatigue, weakness; questionable seizure on -EEG negative   ENT and Mouth Exam  Mucositis - Current: 0 - None   Mucositis - Internal Severity: 0 - None   Esophagitis: 0 - None  Cardiovascular  Respiratory effort: 2 - Mild dyspnea on exertion  Sputum: 0 - None  Cardio/Resp Note: admitted with aspiration pna, markedly improved this week  Gastrointestinal  Nausea: 0 - None  Vomitin - No vomiting (ons)  Diarrhea: 0 - None  Weight loss: 1  - < 10% from baseline  Constipation NCI: 1 - Occasional or intermittent s/sx  Genitourinary  Urinary Status: 0 - Normal  Dysuria: 0 - None  Psychosocial  Mood - Anxiety: 1 - Mild mood alteration  Mood - Depression: 1 - Mild mood alteration  Pyschosocial Note: With flat affect during OTV, did not attempt to make eye contact with team during OTV  Pain Assessment  0-10 Pain Scale: 0    Objective:   There were no vitals taken for this visit.  Gen: Appears well, in no acute distress  Skin: No erythema  Cards: Warm, well perfused  Resp: breathing comfortably with a mask  Neuro: 5/5 all four extremities     Labs:  CBC RESULTS:   Recent Labs   Lab Test 05/13/21  0232   WBC 7.9   RBC 3.07*   HGB 9.1*   HCT 29.5*   MCV 96   MCH 29.6   MCHC 30.8*   RDW 20.1*        ELECTROLYTES:  Recent Labs   Lab Test 05/13/21 0232      POTASSIUM 3.6   CHLORIDE 110*   ESTIVEN 8.7   CO2 31   BUN 32*   CR 0.53   *       Assessment:    Reviewed goals of care. Continue active treatment.  Tolerating radiation therapy well.  All questions and concerns addressed.     Plan:   1. Continue current therapy.    2. Prophylactic steroids not needed for cranial-directed  radiation therapy. Recommend quick taper of decadron unless there is an additional indication for steroid use. Communicated with primary team prior to discharge. We will continue to monitor need for steroids as we see Ms. Bailey in the department for therapy.    3. Recommend administration of second COVID vaccine prior to discharge to TCU if no current medical contraindications given her high risk immunosuppressed status from cancer.     I called the patient's daughter and discussed the above at her request.     Mosaiq chart and setup information reviewed  MVCT/IGRT images checked    Medication Review  Med Note: Changes made by inpatient team, this week increase in Santo Spann MD, PhD     Department of Radiation Oncology  [facility]  [facility]

## 2021-05-13 NOTE — PLAN OF CARE
Care from 7 am to 7 pm  A&Ox4. AVSS except tachycardic at one point. Denied pain. Poor appetite. Incontinent of bladder and bowel;purewick is in place.  Bs 103, 144 and 242.Pt had radiation this morning. Up to chair with a lift. Continue with POC.

## 2021-05-13 NOTE — PROGRESS NOTES
"Care Management Follow Up    Length of Stay (days): 17    Expected Discharge Date: 05/14/21     Concerns to be Addressed: discharge planning     Patient plan of care discussed at interdisciplinary rounds: Yes    Anticipated Discharge Disposition: [facility]     Anticipated Discharge Services: None  Anticipated Discharge DME: None    Patient/family educated on Medicare website which has current facility and service quality ratings: yes(provided over the wknd by Wknd CAMRON. Previously pt/dtr had requested referral to  TCU)  Education Provided on the Discharge Plan: Yes  Patient/Family in Agreement with the Plan: Yes    Referrals Placed by CM/SW: Post Acute Facilities  Private pay costs discussed: Not applicable    Additional Information:  CAMRON met with pt in her room to discuss TCU placement and financial/insurance concerns pt had expressed to Rena, health psychologist. Pt was alert and oriented, able to participate in conversation. [facility] and stated that per her med team, she is ready to discharge there today. Pt also expressed concerns over her hospital stay and how much OOP costs she would have. CAMRON encouraged pt to call, or have her dtr La Nena call pt's insurance company ([employer]) to get specifics on coverage and potential OOP costs. CAMRON also explained TCU coverage and transportation coverage to and from  TCU.    CAMRON spoke with Jolene in  TCU admissions. Their provider reviewed pt and declined pt for admission today due to \"agitation and acute encephalopathy\". CAMRON relayed this to Maximilian Chaparro provider Giuliano, who will make a note that these issues have been resolved. Also updated provider that if pt is to discharge on Seroquel, there will need to be documentation on what it is used for.     Pt's daughter would like a copy of pt's AVS upon discharge and is unable to come to the hospital to pick it up, so this will be mailed to her.     MACARIO Maya, [facility] Hospital[facility]  [address] "   [facility]  Phone: [phone]  Pager: [phone]

## 2021-05-13 NOTE — PLAN OF CARE
Cancel: attempted to see pt x1 this PM however pt unavailable as she was working with other staff/discipline. Due to scheduling constraints will re-schedule pt for tomorrow.

## 2021-05-13 NOTE — PLAN OF CARE
[id number]    Vitals: AVSS on RA    BP (!) 141/81   Pulse 99   Temp 98.1  F (36.7  C) (Axillary)   Resp 18   Wt 67.1 kg (147 lb 14.9 oz)   SpO2 92%   BMI 26.20 kg/m      Mobility: A2, turn q2  Replacements: phos 2.6, replacement infusing  Pain: denies  Nausea: denies  Diet: DD1, Nectar thick    Anxious at bedtime, resolved with bedtime meds. Slept well overnight. Turn/repo q2. 1 medium soft BM and incontinent unmeasured urine. Purwick in place. Phos currently replacing.    Plan to discharge to TCU when ready with OP radiation.     Problem: Adult Inpatient Plan of Care  Goal: Readiness for Transition of Care  Outcome: No Change     Problem: Discharge Planning  Goal: Discharge Planning (Adult, OB, Behavioral, Peds)  Outcome: No Change

## 2021-05-13 NOTE — PROGRESS NOTES
[facility]    Progress Note - [place] 3 Service        Date of Admission:  4/26/2021    Assessment & Plan   Olga Bailey is a 75 y.o. F w/ GBM s/p resection (Feb 2021), depression/anxiety, HTN, asthma, and GERD, who was transferred to [facility] for consideration of inpt rad/onc treatment while she continues to be treated for complex presentation of acute hypoxic respiratory failure (due to possible PJP pneumonia and/or TRALI), 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  Patient had suspected seizure on 5/11, with no further episodes after increase in levetiracetam.      Today:  - Medically stable for discharge, TCU declined her today with plan to review her tomorrow  - Discontinued IV lorazepam     #Acute hypoxic respiratory failure   #Bilateral Pneumonia; presumed PJP s/p ABx treatment  #Concern for transfusion-related acute lung injury (TRALI)  #Concern for R hemidiaphragm paralysis  Dyspneic/hypoxic in late March for admission at OSH. Initially treated with ceftri/doxy. ID switched to course of Zosyn/doxy with Bactrim treatment dosing for suspected PJP (had been on steroids after admission for glioblastoma resection; Fungitell positive but Galactomannan negative). Unable to get adequate sputum samples for diagnosis of PJP. Improved with the Bactrim until several days after RP bleed (see below) and was c/f TRALI as well. W/u for PE, COVID, and respiratory viral panel was negative. Bilateral lung opacities present. Concern for ongoing PJP infection (Bactrim was re-started), inflammatory lung condition (increased steroids), volume overload (diuresed for several days with IV Lasix). Imaging shows R hemidiaphragm elevation (concerning for paralysis?), and opacities appear to be improving as are oxygen requirements. Holding off on bronch per pulm recs and pt/family preference given likely need for intubation. Has had several RRTs for tachypnea  and tachycardia, appear primarily related to anxiety. Improve with PRN anxiety medication and Bipap for comfort. Full work-up largely unremarkable. Slowly able to wean to RA by 5/8.    - Pulmonology consulted, following peripherally. Plan for F/U w/ Pulmonary in 2-3 months with repeat CT imaging, complete PFTs +/- six minute walk  - Furosemide PO 40mg QAM beginning 5/5 - goal net 0 to -500 ml daily  - pulm, ID previously consulted   - Steroids: from methylpred -> prednisone, discontinued 5/4   - PJP ppx: Bactrim single strength qday until 1.5 mo after steroids end (completed 3 week treatment course at OSH)  - PJP stain, PCR; conventional sputum culture ordered if patient can produce sputum   - Bipap when sleeping and PRN for comfort  - SLP consulted - DD1 and nectar thick liquids     Glioblastoma Multiforme  Left frontoparietal brain glioblastoma status post resection 2/23/2021. Seen by radiation oncology 3/24 recommended XRT and chemo radiation. However subsequently admitted for resp failure. MRI brain here signs of possible disease recurrence. Hem/onc concerned that she might not be a candidate for chemotherapy or radiation currently due to poor performance status.  -Rad/onc following; appreciate recs   -Radiation course initiated as inpatient 5/4  -Heme/onc consulted, appreciate recs   - Currently not a candidate for chemo due to poor functional status   - would consider temozolomide at late date pending clinical course (needs to improve performance status)  - Levetiracetam 250mg BID      #Acute Encephalopathy, Resolved  #Seizure  Patient developed sudden change in mental status on 5/11 where there was decreased responsiveness, increased droop in right eye. Patient was seen 1 hr prior to event, no trauma. Concern immediately for stroke versus seizure. Stroke ode called after rapid response. CTA negative for acute bleed or vascular stenosis/obstruction. Neurology suspected post-ictal state following seizure.   -  Neurology consulted  - Levetiracetam 750 mg BID    #Major depressive disorder, recurrent  #Anxiety  Follows with psychiatry and psychology as outpatint.  Saw health psychology during admission for GBM, made plan for outpatient follow-up and med adjustment,  but was unable.   - Psychiatry consulted, appreciate recs  - Palliative recs appreciated;  - Seroquel 25 mg BID + 50 mg at bedtime  - Ativan 0.5 mg q4h PRN  - Scheduled doxepin PRN at night for sleep  - c/t PTA Buspar  - PTA prozac (dose reduced from 80 to 60 mg)  - Scheduled oxycodone TID + PRN for dyspnea  - Health psychology consult; appreciate recs     #Bilateral lower extremity DVT  #Right retroperitoneal hematoma   On 3/29 at outside hospital patient's O2 needs increased, duplex ultrasound revealed bilateral lower extremity DVT. CT PE negative for embolism.  Treated with IV heparin and transitioned to Lovenox 70 mg.  On 4/14 this was complicated by retroperitoneal bleed, requiring 4 units of packed red blood cells.  Lovenox was held, and IVC filter was placed on 4/16. Vascular surgery was involved, and a CT abdomen was stable bleed so no surgical intervention was required.  Eventually resumed on prophylactic 40mg of Lovenox twice daily.  -Continue 40 mg Lovenox qday  -hem/onc consulted; appreciate recs   - pain plan:              - Tylenol 975 mg q8h brianna              - oxycodone 2.5-5 mg q4h PRN for pain              - discontinued Dilaudid to avoid delirium  - Asymmetric foot swelling--LLE U/S 5/8, negative for new DVT, slightly improved chronic superficial thrombosis    #Ileus, resolved  Began after bleed at outside hospital 4/14; was on TPN for nutrition.  GI was consulted for assistance with bowel management.  With escalating bowel regimen finally had small BM 4/25 after tap water enema, had some liquid stool 4/27. AXR shows non-obstructive bowel gas pattern.  -Continue prior Bowel regimen: linaclotide, mesalamine, MiraLAX, simethicone  PRN     #Non-severe malnutrition on TPN, Improved  #Concern for refeeding syndrome  #Hypophosphatemia   Now eating regular diet with adequate calorie counts. 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 Urine sodium 50 while her sodium was 128, certainly consistent with this. Neurologic etiology with her GBM resection likely. Now normalized      #Steroid-induced hyperglycemia   Intermittently requiring insulin  -Low sliding scale insulin  -Gluc checks     #Hypertension  #Sinus tachycardia  Started on metoprolol at OSH for sinus tachycardia.  - c/t PTA amlodipine 10 mg qday    #Chronic medical problems:  - albuterol PRN  - BIPAP at bedtime (on CPAP at home) and for comfort  - GERD: back to PTA PO PPI    #Shock Liver, Resolved  LFTs sharply up after RP bleed at OSH, [place] due to shock liver. LFT's have steadily trended down since. Only AST remains elevated and trending down.      Diet: Snacks/Supplements Adult: Ensure Enlive; Between Meals  Snacks/Supplements Adult: Magic Cup; With Meals  Combination Diet Dysphagia Diet Level 1: Pureed; Nectar Thickened Liquids (pre-thickened or use instant food thickener)    Fluids: None  Lines: PICC line  DVT Prophylaxis: Enoxaparin (Lovenox) SQ  Martino Catheter: not present  Code Status: Full Code         Disposition Plan   Expected discharge: Tomorrow, recommended to transitional care unit once respiratory status improves, plan for rehab and chemo/radiation in place .  Entered: Marvel Mcdowell MD 05/13/2021, 7:22 AM     Marvel Mcdowell MD  Internal Medicine, PGY-1  [facility] 3 Service  _______________________________________________________________________    Interval History    Care team notes reviewed. No acute events overnight. Patient feels better, ready to go to TCU when it is available. No dyspnea, decrease in muscle strength or agitation/anxiety.    4 Point ROS obtained and negative except as noted above.     Data  reviewed today: I reviewed all medications, new labs and imaging results over the last 24 hours. I personally reviewed     Physical Exam   Vital Signs: Temp: 98.1  F (36.7  C) Temp src: Axillary BP: (!) 141/81 Pulse: 99   Resp: 18 SpO2: 92 % O2 Device: None (Room air) Oxygen Delivery: 2 LPM  Weight: 147 lbs 14.86 oz   General Appearance: Alert, awake, no acute distress.  Eyes: no scleral icterus.   HEENT: neck supple, normocephalic. Dry appearing mucous membranes   Respiratory: No accessory muscle use, mildly course breath sounds in bilateral lung fields anteriorly. Breathing comfortably on room air.  Cardiovascular: regular rate rhythm, normal S1/S2, no murmurs appreciated, intact distal pulses  GI: soft, non-tender, non-distended  Skin: scattered bruises over upper exposed upper extremities, no rash noted.   Musculoskeletal: pedal edema in the left foot; no edema of right foot.  Neurologic: Oriented x 3, motor strength on right 3/5, left 4/5. Mild right pronator drift (improved)  Psychiatric: Pleasant affect.     Data   Recent Labs   Lab 05/13/21  0232 05/12/21  1007 05/12/21  0435 05/11/21  1448 05/11/21  0504 05/10/21  0617   WBC 7.9  --  9.6  --  8.1 9.7   HGB 9.1*  --  9.8*  --  9.8* 10.5*   MCV 96  --  98  --  97 100     --  219  --  204 233   INR  --   --   --   --   --  0.98     --  141  --  141 141   POTASSIUM 3.6  --  3.6 4.0 3.4 3.4   CHLORIDE 110*  --  109  --  106 106   CO2 31  --  28  --  28 28   BUN 32*  --  29  --  33* 26   CR 0.53  --  0.50*  --  0.52 0.48*   ANIONGAP 2*  --  4  --  6 7   ESTIVEN 8.7  --  8.8  --  8.8 9.2   *  --  102*  --  92 93   ALBUMIN 2.8*  --  2.8*  --  2.8* 2.9*   PROTTOTAL 5.6*  --  5.4*  --  5.6* 5.8*   BILITOTAL 0.4  --  0.5  --  0.5 0.5   ALKPHOS 88  --  96  --  96 104   ALT 35  --  37  --  42 44   AST 22  --  18  --  26 27   TROPI  --  0.034  --   --   --   --      Recent Results (from the past 24 hour(s))   XR Chest Port 1 View    Narrative    Portable  chest    INDICATION: Worsening shortness of breath with new oxygen requirement    COMPARISON: Chest CT and plain radiographs 5/2/2021    FINDINGS: Heart size normal. Consolidative opacities in the lower  lungs, left greater than right. This actually appears slightly  improved from the radiographs 10 days ago. Mild dextrocurvature  thoracic spine with degenerative changes throughout the thoracic  spine. Degenerative changes at the shoulders. Lower cervical spine  surgical hardware. Right upper extremity PICC line tip is just into  the right atrium.      Impression    IMPRESSION: Consolidative opacities in lower lungs bilaterally, left  greater than right. Slightly improved from plain radiographs 10 days  prior.    JUSTINE JACQUES MD

## 2021-05-14 ENCOUNTER — APPOINTMENT (OUTPATIENT)
Dept: PHYSICAL THERAPY | Facility: CLINIC | Age: 76
DRG: 177 | End: 2021-05-14
Attending: STUDENT IN AN ORGANIZED HEALTH CARE EDUCATION/TRAINING PROGRAM
Payer: MEDICARE

## 2021-05-14 ENCOUNTER — APPOINTMENT (OUTPATIENT)
Dept: OCCUPATIONAL THERAPY | Facility: CLINIC | Age: 76
DRG: 177 | End: 2021-05-14
Attending: STUDENT IN AN ORGANIZED HEALTH CARE EDUCATION/TRAINING PROGRAM
Payer: MEDICARE

## 2021-05-14 ENCOUNTER — ALLIED HEALTH/NURSE VISIT (OUTPATIENT)
Dept: RADIATION ONCOLOGY | Facility: CLINIC | Age: 76
End: 2021-05-14
Attending: STUDENT IN AN ORGANIZED HEALTH CARE EDUCATION/TRAINING PROGRAM
Payer: MEDICARE

## 2021-05-14 ENCOUNTER — APPOINTMENT (OUTPATIENT)
Dept: SPEECH THERAPY | Facility: CLINIC | Age: 76
DRG: 177 | End: 2021-05-14
Attending: STUDENT IN AN ORGANIZED HEALTH CARE EDUCATION/TRAINING PROGRAM
Payer: MEDICARE

## 2021-05-14 LAB
ALBUMIN SERPL-MCNC: 2.8 G/DL (ref 3.4–5)
ALP SERPL-CCNC: 84 U/L (ref 40–150)
ALT SERPL W P-5'-P-CCNC: 32 U/L (ref 0–50)
ANION GAP SERPL CALCULATED.3IONS-SCNC: 1 MMOL/L (ref 3–14)
AST SERPL W P-5'-P-CCNC: 13 U/L (ref 0–45)
BILIRUB SERPL-MCNC: 0.3 MG/DL (ref 0.2–1.3)
BUN SERPL-MCNC: 28 MG/DL (ref 7–30)
CALCIUM SERPL-MCNC: 8.9 MG/DL (ref 8.5–10.1)
CHLORIDE SERPL-SCNC: 111 MMOL/L (ref 94–109)
CO2 SERPL-SCNC: 31 MMOL/L (ref 20–32)
CREAT SERPL-MCNC: 0.51 MG/DL (ref 0.52–1.04)
ERYTHROCYTE [DISTWIDTH] IN BLOOD BY AUTOMATED COUNT: 19.9 % (ref 10–15)
GFR SERPL CREATININE-BSD FRML MDRD: >90 ML/MIN/{1.73_M2}
GLUCOSE BLDC GLUCOMTR-MCNC: 102 MG/DL (ref 70–99)
GLUCOSE BLDC GLUCOMTR-MCNC: 103 MG/DL (ref 70–99)
GLUCOSE BLDC GLUCOMTR-MCNC: 158 MG/DL (ref 70–99)
GLUCOSE BLDC GLUCOMTR-MCNC: 167 MG/DL (ref 70–99)
GLUCOSE SERPL-MCNC: 112 MG/DL (ref 70–99)
HCT VFR BLD AUTO: 29 % (ref 35–47)
HGB BLD-MCNC: 8.9 G/DL (ref 11.7–15.7)
LABORATORY COMMENT REPORT: NORMAL
MAGNESIUM SERPL-MCNC: 1.9 MG/DL (ref 1.6–2.3)
MCH RBC QN AUTO: 29.7 PG (ref 26.5–33)
MCHC RBC AUTO-ENTMCNC: 30.7 G/DL (ref 31.5–36.5)
MCV RBC AUTO: 97 FL (ref 78–100)
PHOSPHATE SERPL-MCNC: 2.5 MG/DL (ref 2.5–4.5)
PLATELET # BLD AUTO: 232 10E9/L (ref 150–450)
POTASSIUM SERPL-SCNC: 3.9 MMOL/L (ref 3.4–5.3)
PROT SERPL-MCNC: 5.6 G/DL (ref 6.8–8.8)
RBC # BLD AUTO: 3 10E12/L (ref 3.8–5.2)
SARS-COV-2 RNA RESP QL NAA+PROBE: NEGATIVE
SODIUM SERPL-SCNC: 144 MMOL/L (ref 133–144)
SPECIMEN SOURCE: NORMAL
WBC # BLD AUTO: 7.5 10E9/L (ref 4–11)

## 2021-05-14 PROCEDURE — 99232 SBSQ HOSP IP/OBS MODERATE 35: CPT | Mod: GC | Performed by: PEDIATRICS

## 2021-05-14 PROCEDURE — 250N000009 HC RX 250: Performed by: INTERNAL MEDICINE

## 2021-05-14 PROCEDURE — 77014 PR CT GUIDE FOR PLACEMENT RADIATION THERAPY FIELDS: CPT | Mod: 26 | Performed by: STUDENT IN AN ORGANIZED HEALTH CARE EDUCATION/TRAINING PROGRAM

## 2021-05-14 PROCEDURE — 84100 ASSAY OF PHOSPHORUS: CPT | Performed by: STUDENT IN AN ORGANIZED HEALTH CARE EDUCATION/TRAINING PROGRAM

## 2021-05-14 PROCEDURE — 250N000011 HC RX IP 250 OP 636: Performed by: STUDENT IN AN ORGANIZED HEALTH CARE EDUCATION/TRAINING PROGRAM

## 2021-05-14 PROCEDURE — 258N000003 HC RX IP 258 OP 636: Performed by: INTERNAL MEDICINE

## 2021-05-14 PROCEDURE — 92526 ORAL FUNCTION THERAPY: CPT | Mod: GN

## 2021-05-14 PROCEDURE — 250N000013 HC RX MED GY IP 250 OP 250 PS 637: Performed by: INTERNAL MEDICINE

## 2021-05-14 PROCEDURE — 80053 COMPREHEN METABOLIC PANEL: CPT | Performed by: STUDENT IN AN ORGANIZED HEALTH CARE EDUCATION/TRAINING PROGRAM

## 2021-05-14 PROCEDURE — 250N000012 HC RX MED GY IP 250 OP 636 PS 637: Performed by: STUDENT IN AN ORGANIZED HEALTH CARE EDUCATION/TRAINING PROGRAM

## 2021-05-14 PROCEDURE — 120N000005 HC R&B MS OVERFLOW UMMC

## 2021-05-14 PROCEDURE — 97530 THERAPEUTIC ACTIVITIES: CPT | Mod: GP | Performed by: PHYSICAL THERAPIST

## 2021-05-14 PROCEDURE — 250N000013 HC RX MED GY IP 250 OP 250 PS 637: Performed by: STUDENT IN AN ORGANIZED HEALTH CARE EDUCATION/TRAINING PROGRAM

## 2021-05-14 PROCEDURE — 82962 GLUCOSE BLOOD TEST: CPT

## 2021-05-14 PROCEDURE — 97110 THERAPEUTIC EXERCISES: CPT | Mod: GP | Performed by: PHYSICAL THERAPIST

## 2021-05-14 PROCEDURE — 83735 ASSAY OF MAGNESIUM: CPT | Performed by: STUDENT IN AN ORGANIZED HEALTH CARE EDUCATION/TRAINING PROGRAM

## 2021-05-14 PROCEDURE — 85027 COMPLETE CBC AUTOMATED: CPT | Performed by: STUDENT IN AN ORGANIZED HEALTH CARE EDUCATION/TRAINING PROGRAM

## 2021-05-14 PROCEDURE — U0005 INFEC AGEN DETEC AMPLI PROBE: HCPCS | Performed by: STUDENT IN AN ORGANIZED HEALTH CARE EDUCATION/TRAINING PROGRAM

## 2021-05-14 PROCEDURE — U0003 INFECTIOUS AGENT DETECTION BY NUCLEIC ACID (DNA OR RNA); SEVERE ACUTE RESPIRATORY SYNDROME CORONAVIRUS 2 (SARS-COV-2) (CORONAVIRUS DISEASE [COVID-19]), AMPLIFIED PROBE TECHNIQUE, MAKING USE OF HIGH THROUGHPUT TECHNOLOGIES AS DESCRIBED BY CMS-2020-01-R: HCPCS | Performed by: STUDENT IN AN ORGANIZED HEALTH CARE EDUCATION/TRAINING PROGRAM

## 2021-05-14 PROCEDURE — 97535 SELF CARE MNGMENT TRAINING: CPT | Mod: GO

## 2021-05-14 PROCEDURE — 77386 HC IMRT TREATMENT DELIVERY, COMPLEX: CPT | Performed by: STUDENT IN AN ORGANIZED HEALTH CARE EDUCATION/TRAINING PROGRAM

## 2021-05-14 RX ORDER — DEXAMETHASONE 1 MG
1 TABLET ORAL 2 TIMES DAILY WITH MEALS
Qty: 3 TABLET | Refills: 0 | Status: CANCELLED | DISCHARGE
Start: 2021-05-17 | End: 2021-05-20

## 2021-05-14 RX ORDER — DEXAMETHASONE 2 MG/1
2 TABLET ORAL DAILY
Status: DISCONTINUED | OUTPATIENT
Start: 2021-05-15 | End: 2021-05-15 | Stop reason: HOSPADM

## 2021-05-14 RX ORDER — DEXAMETHASONE 1 MG
1 TABLET ORAL DAILY
Status: DISCONTINUED | OUTPATIENT
Start: 2021-05-17 | End: 2021-05-15 | Stop reason: HOSPADM

## 2021-05-14 RX ADMIN — PANTOPRAZOLE SODIUM 40 MG: 40 TABLET, DELAYED RELEASE ORAL at 09:20

## 2021-05-14 RX ADMIN — ACETAMINOPHEN 975 MG: 325 TABLET, FILM COATED ORAL at 14:13

## 2021-05-14 RX ADMIN — FUROSEMIDE 40 MG: 40 TABLET ORAL at 09:19

## 2021-05-14 RX ADMIN — QUETIAPINE 50 MG: 50 TABLET ORAL at 20:09

## 2021-05-14 RX ADMIN — DEXAMETHASONE 4 MG: 4 TABLET ORAL at 09:20

## 2021-05-14 RX ADMIN — LINACLOTIDE 290 MCG: 145 CAPSULE, GELATIN COATED ORAL at 09:19

## 2021-05-14 RX ADMIN — MESALAMINE 1000 MG: 1000 SUPPOSITORY RECTAL at 20:11

## 2021-05-14 RX ADMIN — ACETAMINOPHEN 975 MG: 325 TABLET, FILM COATED ORAL at 09:20

## 2021-05-14 RX ADMIN — Medication 2.5 MG: at 20:03

## 2021-05-14 RX ADMIN — AMLODIPINE BESYLATE 10 MG: 10 TABLET ORAL at 09:20

## 2021-05-14 RX ADMIN — BUSPIRONE HYDROCHLORIDE 30 MG: 15 TABLET ORAL at 09:19

## 2021-05-14 RX ADMIN — BUSPIRONE HYDROCHLORIDE 30 MG: 15 TABLET ORAL at 19:57

## 2021-05-14 RX ADMIN — LEVETIRACETAM 750 MG: 750 TABLET, FILM COATED ORAL at 09:26

## 2021-05-14 RX ADMIN — Medication 2.5 MG: at 09:20

## 2021-05-14 RX ADMIN — SODIUM PHOSPHATE, MONOBASIC, MONOHYDRATE 9 MMOL: 276; 142 INJECTION, SOLUTION INTRAVENOUS at 05:34

## 2021-05-14 RX ADMIN — ENOXAPARIN SODIUM 40 MG: 100 INJECTION SUBCUTANEOUS at 16:47

## 2021-05-14 RX ADMIN — PANTOPRAZOLE SODIUM 40 MG: 40 TABLET, DELAYED RELEASE ORAL at 16:53

## 2021-05-14 RX ADMIN — QUETIAPINE 25 MG: 25 TABLET ORAL at 17:01

## 2021-05-14 RX ADMIN — Medication 2.5 MG: at 14:13

## 2021-05-14 RX ADMIN — ACETAMINOPHEN 975 MG: 325 TABLET, FILM COATED ORAL at 19:57

## 2021-05-14 RX ADMIN — SULFAMETHOXAZOLE AND TRIMETHOPRIM 1 TABLET: 400; 80 TABLET ORAL at 09:21

## 2021-05-14 RX ADMIN — INSULIN ASPART 1 UNITS: 100 INJECTION, SOLUTION INTRAVENOUS; SUBCUTANEOUS at 16:51

## 2021-05-14 RX ADMIN — QUETIAPINE 25 MG: 25 TABLET ORAL at 09:21

## 2021-05-14 RX ADMIN — LEVETIRACETAM 750 MG: 750 TABLET, FILM COATED ORAL at 19:57

## 2021-05-14 RX ADMIN — FLUOXETINE HYDROCHLORIDE 60 MG: 40 CAPSULE ORAL at 09:21

## 2021-05-14 RX ADMIN — DOXEPIN HYDROCHLORIDE 3 MG: 10 SOLUTION ORAL at 20:06

## 2021-05-14 ASSESSMENT — ACTIVITIES OF DAILY LIVING (ADL)
ADLS_ACUITY_SCORE: 18
ADLS_ACUITY_SCORE: 23
ADLS_ACUITY_SCORE: 22

## 2021-05-14 NOTE — PLAN OF CARE
[id number]    Vitals: AVSS on RA, except intermittent tachycardia    /80 (BP Location: Left arm)   Pulse 93   Temp 98.5  F (36.9  C) (Axillary)   Resp 16   Wt 67.1 kg (147 lb 14.9 oz)   SpO2 91%   BMI 26.20 kg/m      Mobility: A2, turn/repo q2  Replacements: 9mmol phos infusing  Pain: denies  Nausea: denies  Diet: DD1 pureed diet, Nectar thick liquids     Slept well overnight. Declined q2 repositioning, states that she wants to sleep. Purwick in place; 2 large unmeasured incontinent urine episodes. No BM. 9mmol phos currently replacing per protocol.     Possible discharge to [facility] today. Plan for radiation today, scheduled for 2pm.     Problem: Adult Inpatient Plan of Care  Goal: Readiness for Transition of Care  Outcome: No Change     Problem: Discharge Planning  Goal: Discharge Planning (Adult, OB, Behavioral, Peds)  Outcome: No Change

## 2021-05-14 NOTE — PROGRESS NOTES
Outpatient Physical Therapy Discharge Note     Patient: Olga Bailey  : 1945    Beginning/End Dates of Reporting Period:  3/11/2021 to 3/26/2021    Referring Provider: Sinan Rosenbaum MD    Therapy Diagnosis: decreased independence with functional mobility.      Client Self Report: Denies pain but feeling a lot of fatigue lately.  Starting chemoradiation soon.  Instructed to stop taking Keppra by oncologist.      Goals:  Goal Identifier DGI   Goal Description Olga will score 20/24 or greater on the DGI to indicate improved gait skills and reduced falls risk in order to return home independently.    Target Date 21   Date Met   not met   Progress:     Goal Identifier Stairs   Goal Description Olga will be able to negotiate up and down a flight of stairs reciprocally with one rail without significant fatigue in order to access all levels of her home safely.     Target Date 21   Date Met   not met   Progress:     Goal Identifier Community mobility   Goal Description Olga will be able to ambulate 1 mile outdoors without an assistive device in order to progress toward her prior walking activities for management of her health and wellness.     Target Date 21   Date Met   not met   Progress:     Goal Identifier Gait speed   Goal Description Olga will be able to ambulate 25 feet in 6 sec or less without significant deviation in order to demonstrate improved gait speed and efficiency for community mobility.     Target Date 21   Date Met   not met   Progress:     Progress Toward Goals:   Progress limited due to pt only had 3 visits before needing to be hospitalized.      Plan:  Discharge from therapy.    Discharge:    Reason for Discharge: Change in medical status.    Equipment Issued: none    Discharge Plan: Discharge PT.

## 2021-05-14 NOTE — CONSULTS
We were asked to see her in follow up in regards to the use of Seroquel. She was not seen since she was at radiation (no charge), but chart reviewed and care discussed with primary team.   The Seroquel was suggested as an adjunct to the Prozac and Buspar in regards to her anxiety. I see that she continues to take it, so I presume it must be working. I did check with her daughter La Nena, and she thinks that it is very helpful, and is a bit upset about the talk of needing to taper off of it.   Although Seroquel is classified as an antipsychotic, it does have a FDA indication for adjunctive use in major depressive disorder (in not being used off label).   Therefore, in my opinion it should be continued at the effective dose, unless she develops sedation, in which case it could be decreased to 12.5 mg BID during the day, 25-50 mg HS.   Page me at [phone] as needed.      Curt Madison M.D.   [facility]   Contact information available via [facility] Paging/Directory  If I am not available, then  intake at [phone] should know who   Is on call

## 2021-05-14 NOTE — PLAN OF CARE
Patient alert ,oriented ,and cooperative with her cares. Patient able to shower with NST help, worked with both OT,PT, and speech. Diet advanced, but patient not sure about advancement. Patient has been continent of stool, but not urine. Tried brief and coviden pad, but due to not being able to use both in bed, patient had large incontinence with pad. Place pure wick when back from radiation therapy.

## 2021-05-14 NOTE — DISCHARGE SUMMARY
[facility]  Discharge Summary - Medicine & Pediatrics       Date of Admission:  4/26/2021  Date of Discharge:  5/15/2021  Discharging Provider: Juan C Mariee MD   Discharge Service: [facility] 3    Discharge Diagnoses   #Acute hypoxic respiratory failure, Resolved   #Bilateral Pneumonia; presumed PJP s/p ABx treatment  #Concern for transfusion-related acute lung injury (TRALI)  #Concern for R hemidiaphragm paralysis  #Glioblastoma Multiforme  #Acute Encephalopathy, Resolved  #Seizure, Resolved  #Major depressive disorder, recurrent  #Anxiety  #Bilateral lower extremity DVT  #Right retroperitoneal hematoma   #Ileus, resolved  #Non-severe malnutrition on TPN, Improved  #Concern for refeeding syndrome  #Hypophosphatemia   #Mild hyponatremia likely 2/2 SIADH- resolved   #Steroid-induced hyperglycemia   #Hypertension  #Sinus tachycardia  #GERD  #Shock Liver, Resolved     Follow-ups Needed After Discharge   Radiation therapy - to  Continue current course of 15 sessions of radiation therapy, Appointment on 5/21/2021  Oncology - follow up to assess chemotherapy, Dr. Baker.  Pulmonary - 2-3 months to assess PFTs, CT    Discharge Disposition   Discharged to short-term care facility  Condition at discharge: Stable  Patient ready to discharge to a skilled nursing facility as soon as possible in order to create capacity for patients related to the COVID-19 pandemic.    Hospital Course   Olga Bailey was admitted on 4/26/2021 for Pneumocystis jirovecii pneumonia.  The following problems were addressed during her hospitalization:    #Acute hypoxic respiratory failure   #Bilateral Pneumonia; presumed PJP s/p ABx treatment  #Concern for transfusion-related acute lung injury (TRALI)  #Concern for R hemidiaphragm paralysis  Dyspneic/hypoxic in late March for admission at OSH. Initially treated with ceftri/doxy. ID switched to course of Zosyn/doxy with Bactrim treatment dosing for suspected  PJP (had been on steroids after admission for glioblastoma resection; Fungitell positive but Galactomannan negative). Unable to get adequate sputum samples for diagnosis of PJP. Improved with the Bactrim until several days after RP bleed (see below) and was c/f TRALI as well. W/u for PE, COVID, and respiratory viral panel was negative. Bilateral lung opacities present. Concern for ongoing PJP infection (Bactrim was re-started), inflammatory lung condition (increased steroids), volume overload (diuresed for several days with IV Lasix). Imaging shows R hemidiaphragm elevation (concerning for paralysis?), and opacities appear to be improving as are oxygen requirements. Holding off on bronch per pulm recs and pt/family preference given likely need for intubation. Has had several RRTs for tachypnea and tachycardia, appear primarily related to anxiety. Improve with PRN anxiety medication and Bipap for comfort. Full work-up largely unremarkable. Slowly able to wean to RA by 5/8.    - Pulmonology: plan for F/U w/ Pulmonary in 2-3 months with repeat CT imaging, complete PFTs +/- six minute walk  - Furosemide PO 40mg QAM  - PJP ppx: Bactrim single strength qday until 1.5 mo after steroids end (completed 3 week treatment course at OSH)  - Bipap when sleeping and PRN for comfort  - SLP consulted - DD1 and nectar thick liquids     Glioblastoma Multiforme  Left frontoparietal brain glioblastoma status post resection 2/23/2021. Seen by radiation oncology 3/24 recommended XRT and chemo radiation. However subsequently admitted for resp failure. MRI brain here signs of possible disease recurrence. Hem/onc concerned that she might not be a candidate for chemotherapy or radiation currently due to poor performance status.  - Radiation course initiated as inpatient 5/4 for 15 sessions  -Heme/onc consulted, appreciate recs                 - Currently not a candidate for chemo due to poor functional status                 - would consider  temozolomide at late date pending clinical course (needs to improve performance status)  - Levetiracetam 750mg BID  - Dexamethasone 2mg per day for 2 days (5/15-5/16) followed by 1mg Qday for 3 days (5/17-5/19)        #Acute Encephalopathy, Resolved  #Seizure, Resolved  Patient developed sudden change in mental status on 5/11 where there was decreased responsiveness, increased droop in right eye. Patient was seen 1 hr prior to event, no trauma. Concern immediately for stroke versus seizure. Stroke ode called after rapid response. CTA negative for acute bleed or vascular stenosis/obstruction. Neurology suspected post-ictal state following seizure. After increase dosage of levetiracetam, no additional seizure activity has been observed.  - Follow up with Dr. Baker   - Levetiracetam 750 mg BID     #Major depressive disorder, recurrent  #Anxiety  Follows with psychiatry and psychology as outpatint.  Saw health psychology during admission for GBM, made plan for outpatient follow-up and med adjustment which she was unable to complete. While inpatient, she met with health psychology and they recommended to continue health psychology while at [facility].  - Seroquel 25 mg BID + 50 mg at bedtime  - Ativan 0.5 mg q4h PRN  - Scheduled doxepin PRN at night for sleep  - c/t PTA Buspar  - PTA prozac (dose reduced from 80 to 60 mg)  - Scheduled oxycodone TID + PRN for dyspnea     #Bilateral lower extremity DVT  #Right retroperitoneal hematoma   On 3/29 at outside hospital patient's O2 needs increased, duplex ultrasound revealed bilateral lower extremity DVT. CT PE negative for embolism.  Treated with IV heparin and transitioned to Lovenox 70 mg.  On 4/14 this was complicated by retroperitoneal bleed, requiring 4 units of packed red blood cells.  Lovenox was held, and IVC filter was placed on 4/16. Vascular surgery was involved, and a CT abdomen was stable bleed so no surgical intervention was required.  Eventually resumed on prophylactic  40mg of Lovenox twice daily.  -Continue 40 mg Lovenox qday  -Pain plan:              - Tylenol 975 mg q8h brianna              - oxycodone 2.5-5 mg q4h PRN for pain  - Asymmetric foot swelling--LLE U/S 5/8, negative for new DVT, slightly improved chronic superficial thrombosis     #Ileus, resolved  Began after bleed at outside hospital 4/14; was on TPN for nutrition.  GI was consulted for assistance with bowel management.  With escalating bowel regimen finally had small BM 4/25 after tap water enema, had some liquid stool 4/27. Ileus there after resolved and patient continued to have 1-2 loose stools per day prior to discharge.  -Continue prior Bowel regimen: linaclotide, mesalamine, polyethylene glycol PRN, simethicone PRN     #Non-severe malnutrition on TPN, Improved  #Concern for refeeding syndrome  #Hypophosphatemia   Now eating regular diet with adequate calorie counts. Required TPN until 5/6.     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 Urine sodium 50 while her sodium was 128, certainly consistent with this. Neurologic etiology with her GBM resection likely. Has since normalized, no additional follow up needed for hyponatremia.     #Steroid-induced hyperglycemia   Intermittently requiring insulin while on prednisone and on dexamethasone.     #Hypertension  #Sinus tachycardia  Started on metoprolol at OSH for sinus tachycardia. Suspect tachycardia is associated with hospitlaization versus anxiety. Electrolytes have remained normal. Repeated ECGs demonstrated sinus tachycardia without abnormal rhythms.  - c/t PTA amlodipine 10 mg qday     #Chronic medical problems:  - albuterol PRN  - BIPAP at bedtime (on CPAP at home) and for comfort  - GERD: back to PTA PO PPI     #Shock Liver, Resolved  LFTs sharply up after RP bleed at OSH, [place] due to shock liver. LFT's have steadily trended down since. No additional follow up needed.     Consultations This Hospital Stay   ONCOLOGY ADULT IP  CONSULT  RADIATION ONCOLOGY IP CONSULT  NUTRITION SERVICES ADULT IP CONSULT  PHYSICAL THERAPY ADULT IP CONSULT  OCCUPATIONAL THERAPY ADULT IP CONSULT  PALLIATIVE CARE ADULT IP CONSULT  PHARMACY/NUTRITION TO START AND MANAGE TPN  PULMONARY GENERAL ADULT IP CONSULT  SPEECH LANGUAGE PATH ADULT IP CONSULT  PHARMACY IP CONSULT  INFECTIOUS DISEASE GENERAL ADULT IP CONSULT  PSYCHOLOGY ADULT IP CONSULT  CARE MANAGEMENT / SOCIAL WORK IP CONSULT  PHYSICAL THERAPY ADULT IP CONSULT  OCCUPATIONAL THERAPY ADULT IP CONSULT  PSYCHIATRY IP CONSULT  WOUND OSTOMY CONTINENCE NURSE  IP CONSULT  PHARMACY IP CONSULT  NEUROLOGY GENERAL ADULT IP CONSULT    Code Status   Full Code     The patient was discussed with Dr. Kodi Mcdowell MD  [address]  [address] [address]  Phone: [phone]  Fax: [phone]  ______________________________________________________________________    Physical Exam   Vital Signs: Temp: 97.6  F (36.4  C) Temp src: Oral BP: 119/72 Pulse: 119   Resp: 18 SpO2: 93 % O2 Device: None (Room air)    Weight: 147 lbs 14.86 oz  Constitutional: awake, alert, cooperative, no apparent distress, and appears stated age  Eyes: Lids and lashes normal, pupils equal, round and reactive to light, extra ocular muscles intact, sclera clear, conjunctiva normal  ENT: Normocephalic, without obvious abnormality, atraumatic, sinuses nontender on palpation, external ears without lesions, oral pharynx with moist mucous membranes, tonsils without erythema or exudates, gums normal and good dentition.  Respiratory: No increased work of breathing, good air exchange, clear to auscultation bilaterally, no crackles or wheezing  Cardiovascular: Normal apical impulse, regular rate and rhythm, normal S1 and S2, no S3 or S4, and no murmur noted  GI: No scars, normal bowel sounds, soft, non-distended, non-tender, no masses palpated, no  hepatosplenomegally  Skin: no bruising or bleeding and normal skin color, texture, turgor  Musculoskeletal: There is no redness, warmth, or swelling of the joints.  Full range of motion noted.  Motor strength is 5 out of 5 all extremities bilaterally.  Tone is normal.  Neurologic: Awake, alert, oriented to name, place and time.  Cranial nerves II-XII are grossly intact.  Motor in right extremities 3/5, left extremities 4/5.    Primary Care Physician   [facility]    Discharge Orders   No discharge procedures on file.    Significant Results and Procedures   Most Recent 3 CBC's:  Recent Labs   Lab Test 05/15/21  0505 05/14/21  0340 05/13/21  0232   WBC 7.8 7.5 7.9   HGB 9.5* 8.9* 9.1*   MCV 98 97 96    232 228     Most Recent 3 BMP's:  Recent Labs   Lab Test 05/15/21  1310 05/15/21  0505 05/14/21  0340 05/13/21  0232   NA  --  142 144 143   POTASSIUM 4.1 3.4 3.9 3.6   CHLORIDE  --  108 111* 110*   CO2  --  30 31 31   BUN  --  23 28 32*   CR  --  0.50* 0.51* 0.53   ANIONGAP  --  4 1* 2*   SETIVEN  --  8.8 8.9 8.7   GLC  --  84 112* 105*     Most Recent 2 LFT's:  Recent Labs   Lab Test 05/15/21  0505 05/14/21  0340   AST 18 13   ALT 31 32   ALKPHOS 79 84   BILITOTAL 0.3 0.3       Discharge Medications   Current Discharge Medication List      CONTINUE these medications which have NOT CHANGED    Details   acetaminophen (TYLENOL) 325 MG tablet Take 650 mg by mouth every 4 hours as needed for mild pain       acyclovir (ZOVIRAX) 5 % external ointment Apply topically every 3 hours    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      albuterol (PROAIR HFA/PROVENTIL HFA/VENTOLIN HFA) 108 (90 Base) MCG/ACT inhaler Inhale 2 puffs into the lungs every 4 hours as needed for wheezing  Qty:      Comments: Pharmacy may dispense brand covered by insurance (Proair, or proventil or ventolin or generic albuterol inhaler)  Associated Diagnoses: 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, unspecified part of  lung (H)      albuterol (PROVENTIL) (2.5 MG/3ML) 0.083% neb solution Take 1 vial (2.5 mg) by nebulization every 4 hours as needed for wheezing or shortness of breath / dyspnea  Qty:      Associated Diagnoses: Hypoxia      amLODIPine (NORVASC) 10 MG tablet Take 1 tablet (10 mg) by mouth daily  Qty: 30 tablet, Refills: 0    Associated Diagnoses: Essential hypertension      bisacodyl (DULCOLAX) 10 MG suppository Place 1 suppository (10 mg) rectally daily as needed for constipation    Associated Diagnoses: Essential hypertension      busPIRone HCl (BUSPAR) 30 MG tablet Take 30 mg by mouth 2 times daily      carboxymethylcellulose PF (REFRESH PLUS) 0.5 % ophthalmic solution Place 1 drop into both eyes every hour as needed for dry eyes    Associated Diagnoses: Dry eyes      enoxaparin ANTICOAGULANT (LOVENOX) 40 MG/0.4ML syringe Inject 0.4 mLs (40 mg) Subcutaneous every 24 hours    Associated Diagnoses: Acute deep vein thrombosis (DVT) of tibial vein of both lower extremities (H)      FLUoxetine (PROZAC) 20 MG capsule Take 80 mg by mouth daily      hydrOXYzine (ATARAX) 25 MG tablet Take 1-2 tablets (25-50 mg) by mouth every 6 hours as needed for anxiety or other (sleep)  Qty: 20 tablet, Refills: 0    Associated Diagnoses: Anxiety      insulin aspart (NOVOLOG VIAL) 100 UNITS/ML vial Novolog Flexpen  If Pre-meal Glucose  140 -164 give 1 unit  165 -189 give 2 units  190 -214 give 3 units  215 -239 give 4 units  240 -264 give 5 units  265 -289 give 6 units  290 -314 give 7 units  315 -339 give 8 units  340+ give 9 units    If Bedtime Glucose  200 -214 give 1 unit  215 -264 give 2 units  265 -314 give 3 units  315+ give 4 units  Qty: 10 mL, Refills: 0    Associated Diagnoses: Steroid-induced hyperglycemia      lactulose (CHRONULAC) 10 GM/15ML solution Take 4.5 mLs (3 g) by mouth 2 times daily    Associated Diagnoses: Other impaction of intestine (H)      levETIRAcetam (KEPPRA) 250 MG tablet Take 1 tablet (250 mg) by mouth 2  times daily for 2 days    Comments: FURTHER REGIMEN TO BE DETERMINED BY NEURO-ONC  Associated Diagnoses: Glioblastoma (H)      linaclotide (LINZESS) 290 MCG capsule Take 1 capsule (290 mcg) by mouth every morning (before breakfast)    Associated Diagnoses: Other impaction of intestine (H)      lipids (INTRALIPID) 20 % infusion Inject 250 mLs into the vein every 24 hours    Associated Diagnoses: Other dysphagia      LORazepam (ATIVAN) 0.5 MG tablet Take 1 tablet (0.5 mg) by mouth 2 times daily as needed for anxiety  Qty: 10 tablet, Refills: 0    Associated Diagnoses: Anxiety      mesalamine (CANASA) 1000 MG suppository Place 1 suppository (1,000 mg) rectally At Bedtime    Associated Diagnoses: Other impaction of intestine (H)      methylPREDNISolone sodium succinate (SOLU-MEDROL) 125 mg/2 mL injection Inject 1 mL (62.5 mg) into the vein every 12 hours    Associated Diagnoses: 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, unspecified part of lung (H)      metoprolol (LOPRESSOR) 5 MG/5ML injection Inject 2.5 mLs (2.5 mg) into the vein every 6 hours  Qty:      Associated Diagnoses: Essential hypertension      pantoprazole (PROTONIX) 40 mg IV push injection Inject 40 mg into the vein 2 times daily (with meals)  Qty:      Associated Diagnoses: Gastroesophageal reflux disease without esophagitis      parenteral nutrition - PTA/DISCHARGE ORDER The TPN formula will print on the After Visit Summary Report.  Qty: 1 each, Refills: 0    Associated Diagnoses: Other dysphagia      !! polyethylene glycol (MIRALAX) 17 g packet Take 17 g by mouth daily    Associated Diagnoses: Other impaction of intestine (H)      !! polyethylene glycol (MIRALAX) 17 g packet Take 17 g by mouth daily as needed (constipation)    Associated Diagnoses: Other impaction of intestine (H)      senna-docusate (SENOKOT-S/PERICOLACE) 8.6-50 MG tablet Take 2 tablets by mouth 2 times daily  Qty:      Associated Diagnoses: Other impaction of intestine (H)       simethicone (MYLICON) 40 MG/0.6ML suspension Take 0.6 mLs (40 mg) by mouth every 6 hours as needed for cramping    Associated Diagnoses: Other impaction of intestine (H)      sulfamethoxazole-trimethoprim 320 mg Inject 320 mg into the vein every 12 hours  Qty:      Associated Diagnoses: 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 - Potential duplicate medications found. Please discuss with provider.        Allergies   Allergies   Allergen Reactions     Bacitracin Rash     Bactroban is effective; No difficulties     Erythromycin      intolerant.     Meperidine Hcl      intolerant only   Demerol     Chloraprep One Step Rash

## 2021-05-14 NOTE — PROGRESS NOTES
Care Management Follow Up    Length of Stay (days): 18    Expected Discharge Date: 05/11/21     Concerns to be Addressed: discharge planning     Patient plan of care discussed at interdisciplinary rounds: Yes    Anticipated Discharge Disposition: [facility]  Anticipated Discharge Services: None  Anticipated Discharge DME: None    Patient/family educated on Medicare website which has current facility and service quality ratings: yes  Education Provided on the Discharge Plan: yes  Patient/Family in Agreement with the Plan: yes    Referrals Placed by CM/SW:   [facility]  *[id number]    Private pay costs discussed: Not applicable    Additional Information:  [facility] following for dispo needs- per pt's med team, pt can discharge today after radiology (at 2pm) if [facility] is able to take her.    [facility] spoke with [facility] (Jolene) and [facility] does not have any beds available today. Per Jolene, pt will need a new COVID test as the last one was taken a few days ago. [facility] also needs a plan as to why pt is utilizing Seroquel and what the plan is for weaning. Jolene anticipates open beds tomorrow, over the weekend. [facility] will continue to follow for admission.     [facility] spoke with provider and they are aware. Team is hopeful for discharge tomorrow.     ADDENDUM: 1520  SW spoke with provider and they shared that after speaking with psych, pt is on Seroquel to manage moods. Provider is curious on whether or not pt will need a weaning plan if the Seroquel is documented in psych notes with regards to managing mood. SW messaged Jolene and she shared that Major depressive disorder is an approved use for Seroquel. There doesn't need to be a taper plan if that is not the plan to taper this medication for the pt, but provider will need to make it clear in notes. [facility] spoke with provider and shared that pt does not need a taper plan if they intend for pt to stay on it. Provider is aware that they need to document that pt will need to stay on this medication to  manage moods and that there will be no weaning plan so that [facility] is aware.     MACARIO Sutton, [facility]  Acute Care Float   [facility]  Phone: [phone]  Pager: [phone]

## 2021-05-14 NOTE — PROGRESS NOTES
[facility]    Progress Note - Maximilian 3 Service        Date of Admission:  4/26/2021    Assessment & Plan   Olga Bailey is a 75 y.o. F w/ GBM s/p resection (Feb 2021), depression/anxiety, HTN, asthma, and GERD, who was transferred to [facility] for consideration of inpt rad/onc treatment while she continues to be treated for complex presentation of acute hypoxic respiratory failure (due to possible PJP pneumonia and/or TRALI), 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  Patient had suspected seizure on 5/11, with no further episodes after increase in levetiracetam.    Today:  - Medically stable for discharge --> will touch base with SW in the PM regarding her final discharge date, likely tomorrow (5/15) after her radiation  - Need Seroquel tapering plan and new COVID test prior to TCU admission  - Per ID (full note on 4/27), no indication to continue steroids for any PJP indication since her treatment course has been completed.  Please taper the steroids as rapidly as is feasible. Need to watch for adrenal insufficiency (given that she has been on steroid therapy > 2 months)  - Per Radiation Oncology, prophylactic steroids not needed for cranial-directed  radiation therapy. Recommend quick taper of decadron unless there is an additional indication for steroid use. Will plan for decadron 2 mg x 2 days then 1 mg x 3 days then stop.     Acute hypoxic respiratory failure (improving)  Bilateral Pneumonia; presumed PJP s/p ABx treatment  Concern for transfusion-related acute lung injury (TRALI)  Dyspneic/hypoxic in late March for admission at OSH. Initially treated with ceftri/doxy. ID switched to course of Zosyn/doxy with Bactrim treatment dosing for suspected PJP (had been on steroids after admission for glioblastoma resection; Fungitell positive but Galactomannan negative). Unable to get adequate sputum samples for diagnosis of PJP. Improved  with the Bactrim until several days after RP bleed (see below) and was c/f TRALI as well. W/u for PE, COVID, and respiratory viral panel was negative. Bilateral lung opacities present. Concern for ongoing PJP infection (Bactrim was re-started), inflammatory lung condition (increased steroids), volume overload (diuresed for several days with IV Lasix). Imaging shows R hemidiaphragm elevation (concerning for paralysis?), and opacities appear to be improving as are oxygen requirements. Holding off on bronch per pulm recs and pt/family preference given likely need for intubation. Has had several RRTs for tachypnea and tachycardia, appear primarily related to anxiety. Improve with PRN anxiety medication and Bipap for comfort. Full work-up largely unremarkable. Slowly able to wean to RA by 5/8.    - Pulmonology consulted, following peripherally. Plan for F/U w/ Pulmonary in 2-3 months with repeat CT imaging, complete PFTs +/- six minute walk  - Furosemide PO 40mg QAM beginning 5/5 - goal net 0 to -500 ml daily  - pulm, ID previously consulted   - Steroids: from methylpred -> prednisone, discontinued 5/4   - PJP ppx: Bactrim single strength qday until 1.5 mo after steroids end (completed 3 week treatment course at OSH)  - PJP stain, PCR; conventional sputum culture ordered if patient can produce sputum   - Bipap when sleeping and PRN for comfort  - SLP consulted - DD1 and nectar thick liquids     Glioblastoma Multiforme  Left frontoparietal brain glioblastoma status post resection 2/23/2021. Seen by radiation oncology 3/24 recommended XRT and chemo radiation. However subsequently admitted for resp failure. MRI brain here signs of possible disease recurrence. Hem/onc concerned that she might not be a candidate for chemotherapy or radiation currently due to poor performance status.  -Rad/onc following; appreciate recs   -Radiation course initiated as inpatient 5/4  -Heme/onc consulted, appreciate recs   - Currently not a  candidate for chemo due to poor functional status   - would consider temozolomide at late date pending clinical course (needs to improve performance status)  - Levetiracetam 250mg BID    Acute Encephalopathy, Resolved  Seizure  Patient developed sudden change in mental status on 5/11 where there was decreased responsiveness, increased droop in right eye. Patient was seen 1 hr prior to event, no trauma. Concern immediately for stroke versus seizure. Stroke ode called after rapid response. CTA negative for acute bleed or vascular stenosis/obstruction. Neurology suspected post-ictal state following seizure.   - Neurology consulted  - Levetiracetam 750 mg BID    Major depressive disorder, recurrent  Anxiety  Follows with psychiatry and psychology as outpatint.  Saw health psychology during admission for GBM, made plan for outpatient follow-up and med adjustment,  but was unable.   - Psychiatry consulted, appreciate recs  - Palliative recs appreciated;  - Seroquel 25 mg BID + 50 mg at bedtime  - Ativan 0.5 mg q4h PRN  - Scheduled doxepin PRN at night for sleep  - c/t PTA Buspar  - PTA prozac (dose reduced from 80 to 60 mg)  - Scheduled oxycodone TID + PRN for dyspnea  - Health psychology consult; appreciate recs     Bilateral lower extremity DVT  Right retroperitoneal hematoma   On 3/29 at outside hospital patient's O2 needs increased, duplex ultrasound revealed bilateral lower extremity DVT. CT PE negative for embolism.  Treated with IV heparin and transitioned to Lovenox 70 mg.  On 4/14 this was complicated by retroperitoneal bleed, requiring 4 units of packed red blood cells.  Lovenox was held, and IVC filter was placed on 4/16. Vascular surgery was involved, and a CT abdomen was stable bleed so no surgical intervention was required.  Eventually resumed on prophylactic 40mg of Lovenox twice daily.  -Continue 40 mg Lovenox qday  -hem/onc consulted; appreciate recs   - pain plan:              - Tylenol 975 mg q8h brianna               - oxycodone 2.5-5 mg q4h PRN for pain              - discontinued Dilaudid to avoid delirium  - Asymmetric foot swelling--LLE U/S 5/8, negative for new DVT, slightly improved chronic superficial thrombosis    Ileus, resolved  Began after bleed at outside hospital 4/14; was on TPN for nutrition.  GI was consulted for assistance with bowel management.  With escalating bowel regimen finally had small BM 4/25 after tap water enema, had some liquid stool 4/27. AXR shows non-obstructive bowel gas pattern.  -Continue prior Bowel regimen: linaclotide, mesalamine, MiraLAX, simethicone PRN     Non-severe malnutrition on TPN, Improved  Concern for refeeding syndrome  Hypophosphatemia   Now eating regular diet with adequate calorie counts. 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 Urine sodium 50 while her sodium was 128, certainly consistent with this. Neurologic etiology with her GBM resection likely. Now normalized      Steroid-induced hyperglycemia   Intermittently requiring insulin  -Low sliding scale insulin  -Gluc checks     Hypertension  Started on metoprolol at OSH for sinus tachycardia.  - c/t PTA amlodipine 10 mg qday    Chronic medical problems:  - albuterol PRN  - BIPAP at bedtime (on CPAP at home) and for comfort  - GERD: back to PTA PO PPI    Shock Liver, Resolved  LFTs sharply up after RP bleed at OSH, [place] due to shock liver. LFT's have steadily trended down since. Only AST remains elevated and trending down.      Diet: Snacks/Supplements Adult: Ensure Enlive; Between Meals  Snacks/Supplements Adult: Magic Cup; With Meals  Combination Diet Dysphagia Diet Level 2: Mechan Altered; Nectar Thickened Liquids (pre-thickened or use instant food thickener)    Fluids: None  Lines: PICC line  DVT Prophylaxis: Enoxaparin (Lovenox) SQ  Martino Catheter: not present  Code Status: Full Code         Disposition Plan   Expected discharge: tomorrow,  "recommended to transitional care unit once respiratory status improves, plan for rehab and chemo/radiation in place .  Entered: Ahmet Bruno MD 05/14/2021, 12:34 PM     Ahmet \"KP\" MD Damion    Internal Medicine, PGY-3  [facility]  Pager: [phone]    _______________________________________________________________________    Interval History    Nurse note reviewed. No acute events overnight. States that she is doing well this morning. Denies any fever, chest pain, shortness of breath, cough, abdominal pain or changes in bowel movement/urination.    4 Point ROS obtained and negative except as noted above.     Data reviewed today: I reviewed all medications, new labs and imaging results over the last 24 hours. I personally reviewed     Physical Exam   Vital Signs: Temp: 98.7  F (37.1  C) Temp src: Axillary BP: 132/87 Pulse: 86   Resp: 16 SpO2: 96 % O2 Device: None (Room air)    Weight: 147 lbs 14.86 oz   General Appearance: Alert, awake, no acute distress.  Eyes: no scleral icterus.   HEENT: neck supple, normocephalic. Dry appearing mucous membranes   Respiratory: No accessory muscle use, mildly course breath sounds in bilateral lung fields anteriorly. Breathing comfortably on room air.  Cardiovascular: regular rate rhythm, normal S1/S2, no murmurs appreciated, intact distal pulses  GI: soft, non-tender, non-distended  Skin: scattered bruises over upper exposed upper extremities, no rash noted.   Musculoskeletal: pedal edema in the left foot; no edema of right foot.  Neurologic: Oriented x 3, motor strength on right 3/5, left 4/5. Mild right pronator drift (improved)  Psychiatric: Pleasant affect.     Data   Recent Labs   Lab 05/14/21  0340 05/13/21  0232 05/12/21  1007 05/12/21  0435 05/10/21  0617 05/10/21  0617   WBC 7.5 7.9  --  9.6   < > 9.7   HGB 8.9* 9.1*  --  9.8*   < > 10.5*   MCV 97 96  --  98   < > 100    228  --  219   < > 233   INR  --   --   --   --   --  0.98   NA " 144 143  --  141   < > 141   POTASSIUM 3.9 3.6  --  3.6   < > 3.4   CHLORIDE 111* 110*  --  109   < > 106   CO2 31 31  --  28   < > 28   BUN 28 32*  --  29   < > 26   CR 0.51* 0.53  --  0.50*   < > 0.48*   ANIONGAP 1* 2*  --  4   < > 7   ESTIVEN 8.9 8.7  --  8.8   < > 9.2   * 105*  --  102*   < > 93   ALBUMIN 2.8* 2.8*  --  2.8*   < > 2.9*   PROTTOTAL 5.6* 5.6*  --  5.4*   < > 5.8*   BILITOTAL 0.3 0.4  --  0.5   < > 0.5   ALKPHOS 84 88  --  96   < > 104   ALT 32 35  --  37   < > 44   AST 13 22  --  18   < > 27   TROPI  --   --  0.034  --   --   --     < > = values in this interval not displayed.     No results found for this or any previous visit (from the past 24 hour(s)).

## 2021-05-15 ENCOUNTER — HEALTH MAINTENANCE LETTER (OUTPATIENT)
Age: 76
End: 2021-05-15

## 2021-05-15 ENCOUNTER — HOSPITAL ENCOUNTER (INPATIENT)
Facility: SKILLED NURSING FACILITY | Age: 76
LOS: 11 days | Discharge: ANOTHER HOSPITAL | DRG: 177 | End: 2021-05-26
Attending: INTERNAL MEDICINE | Admitting: INTERNAL MEDICINE
Payer: MEDICARE

## 2021-05-15 VITALS
RESPIRATION RATE: 18 BRPM | TEMPERATURE: 99 F | BODY MASS INDEX: 26.2 KG/M2 | DIASTOLIC BLOOD PRESSURE: 68 MMHG | WEIGHT: 147.93 LBS | HEART RATE: 108 BPM | SYSTOLIC BLOOD PRESSURE: 118 MMHG | OXYGEN SATURATION: 94 %

## 2021-05-15 DIAGNOSIS — E16.2 HYPOGLYCEMIA: ICD-10-CM

## 2021-05-15 DIAGNOSIS — R53.81 PHYSICAL DECONDITIONING: Primary | ICD-10-CM

## 2021-05-15 DIAGNOSIS — L85.3 DRY SKIN: ICD-10-CM

## 2021-05-15 DIAGNOSIS — I10 BENIGN ESSENTIAL HYPERTENSION: ICD-10-CM

## 2021-05-15 DIAGNOSIS — K21.00 GASTROESOPHAGEAL REFLUX DISEASE WITH ESOPHAGITIS WITHOUT HEMORRHAGE: ICD-10-CM

## 2021-05-15 DIAGNOSIS — K59.00 CONSTIPATION, UNSPECIFIED CONSTIPATION TYPE: ICD-10-CM

## 2021-05-15 LAB
ALBUMIN SERPL-MCNC: 2.8 G/DL (ref 3.4–5)
ALP SERPL-CCNC: 79 U/L (ref 40–150)
ALT SERPL W P-5'-P-CCNC: 31 U/L (ref 0–50)
ANION GAP SERPL CALCULATED.3IONS-SCNC: 4 MMOL/L (ref 3–14)
AST SERPL W P-5'-P-CCNC: 18 U/L (ref 0–45)
BILIRUB SERPL-MCNC: 0.3 MG/DL (ref 0.2–1.3)
BUN SERPL-MCNC: 23 MG/DL (ref 7–30)
CALCIUM SERPL-MCNC: 8.8 MG/DL (ref 8.5–10.1)
CHLORIDE SERPL-SCNC: 108 MMOL/L (ref 94–109)
CO2 SERPL-SCNC: 30 MMOL/L (ref 20–32)
CREAT SERPL-MCNC: 0.5 MG/DL (ref 0.52–1.04)
ERYTHROCYTE [DISTWIDTH] IN BLOOD BY AUTOMATED COUNT: 19.1 % (ref 10–15)
GFR SERPL CREATININE-BSD FRML MDRD: >90 ML/MIN/{1.73_M2}
GLUCOSE BLDC GLUCOMTR-MCNC: 114 MG/DL (ref 70–99)
GLUCOSE BLDC GLUCOMTR-MCNC: 135 MG/DL (ref 70–99)
GLUCOSE BLDC GLUCOMTR-MCNC: 141 MG/DL (ref 70–99)
GLUCOSE BLDC GLUCOMTR-MCNC: 91 MG/DL (ref 70–99)
GLUCOSE SERPL-MCNC: 84 MG/DL (ref 70–99)
HCT VFR BLD AUTO: 29.5 % (ref 35–47)
HGB BLD-MCNC: 9.5 G/DL (ref 11.7–15.7)
MAGNESIUM SERPL-MCNC: 2 MG/DL (ref 1.6–2.3)
MCH RBC QN AUTO: 31.5 PG (ref 26.5–33)
MCHC RBC AUTO-ENTMCNC: 32.2 G/DL (ref 31.5–36.5)
MCV RBC AUTO: 98 FL (ref 78–100)
PHOSPHATE SERPL-MCNC: 2.6 MG/DL (ref 2.5–4.5)
PLATELET # BLD AUTO: 244 10E9/L (ref 150–450)
POTASSIUM BLD-SCNC: 4.1 MMOL/L (ref 3.4–5.3)
POTASSIUM SERPL-SCNC: 3.4 MMOL/L (ref 3.4–5.3)
PROT SERPL-MCNC: 5.5 G/DL (ref 6.8–8.8)
RBC # BLD AUTO: 3.02 10E12/L (ref 3.8–5.2)
SODIUM SERPL-SCNC: 142 MMOL/L (ref 133–144)
WBC # BLD AUTO: 7.8 10E9/L (ref 4–11)

## 2021-05-15 PROCEDURE — 82962 GLUCOSE BLOOD TEST: CPT

## 2021-05-15 PROCEDURE — 84132 ASSAY OF SERUM POTASSIUM: CPT | Performed by: STUDENT IN AN ORGANIZED HEALTH CARE EDUCATION/TRAINING PROGRAM

## 2021-05-15 PROCEDURE — 250N000011 HC RX IP 250 OP 636: Performed by: PEDIATRICS

## 2021-05-15 PROCEDURE — 85027 COMPLETE CBC AUTOMATED: CPT | Performed by: INTERNAL MEDICINE

## 2021-05-15 PROCEDURE — 120N000009 HC R&B SNF

## 2021-05-15 PROCEDURE — 80053 COMPREHEN METABOLIC PANEL: CPT | Performed by: INTERNAL MEDICINE

## 2021-05-15 PROCEDURE — 999N001017 HC STATISTIC GLUCOSE BY METER IP

## 2021-05-15 PROCEDURE — 250N000013 HC RX MED GY IP 250 OP 250 PS 637: Performed by: STUDENT IN AN ORGANIZED HEALTH CARE EDUCATION/TRAINING PROGRAM

## 2021-05-15 PROCEDURE — 250N000009 HC RX 250: Performed by: PEDIATRICS

## 2021-05-15 PROCEDURE — 250N000011 HC RX IP 250 OP 636: Performed by: STUDENT IN AN ORGANIZED HEALTH CARE EDUCATION/TRAINING PROGRAM

## 2021-05-15 PROCEDURE — 99239 HOSP IP/OBS DSCHRG MGMT >30: CPT | Mod: GC | Performed by: PEDIATRICS

## 2021-05-15 PROCEDURE — 250N000012 HC RX MED GY IP 250 OP 636 PS 637: Performed by: STUDENT IN AN ORGANIZED HEALTH CARE EDUCATION/TRAINING PROGRAM

## 2021-05-15 PROCEDURE — 250N000013 HC RX MED GY IP 250 OP 250 PS 637: Performed by: INTERNAL MEDICINE

## 2021-05-15 PROCEDURE — 83735 ASSAY OF MAGNESIUM: CPT | Performed by: INTERNAL MEDICINE

## 2021-05-15 PROCEDURE — 258N000003 HC RX IP 258 OP 636: Performed by: PEDIATRICS

## 2021-05-15 PROCEDURE — 84100 ASSAY OF PHOSPHORUS: CPT | Performed by: INTERNAL MEDICINE

## 2021-05-15 PROCEDURE — 999N000157 HC STATISTIC RCP TIME EA 10 MIN

## 2021-05-15 RX ORDER — DOXEPIN HYDROCHLORIDE 10 MG/ML
3 SOLUTION ORAL AT BEDTIME
Qty: 118 ML | Refills: 0 | Status: ON HOLD | DISCHARGE
Start: 2021-05-15 | End: 2021-07-15

## 2021-05-15 RX ORDER — LANOLIN ALCOHOL/MO/W.PET/CERES
6 CREAM (GRAM) TOPICAL
Qty: 30 TABLET | Refills: 0 | Status: ON HOLD | DISCHARGE
Start: 2021-05-15 | End: 2022-04-05

## 2021-05-15 RX ORDER — QUETIAPINE FUMARATE 50 MG/1
50 TABLET, FILM COATED ORAL AT BEDTIME
Qty: 30 TABLET | Refills: 0 | Status: ON HOLD | DISCHARGE
Start: 2021-05-15 | End: 2021-07-15

## 2021-05-15 RX ORDER — SENNOSIDES 8.6 MG
1 TABLET ORAL 2 TIMES DAILY PRN
Status: DISCONTINUED | OUTPATIENT
Start: 2021-05-15 | End: 2021-05-26 | Stop reason: HOSPADM

## 2021-05-15 RX ORDER — ACETAMINOPHEN 325 MG/1
650 TABLET ORAL EVERY 4 HOURS PRN
Status: DISCONTINUED | OUTPATIENT
Start: 2021-05-15 | End: 2021-05-26 | Stop reason: HOSPADM

## 2021-05-15 RX ORDER — POLYETHYLENE GLYCOL 3350 17 G/17G
17 POWDER, FOR SOLUTION ORAL DAILY PRN
Status: DISCONTINUED | OUTPATIENT
Start: 2021-05-15 | End: 2021-05-15 | Stop reason: HOSPADM

## 2021-05-15 RX ORDER — QUETIAPINE FUMARATE 25 MG/1
25 TABLET, FILM COATED ORAL 2 TIMES DAILY
Status: DISCONTINUED | OUTPATIENT
Start: 2021-05-15 | End: 2021-05-16

## 2021-05-15 RX ORDER — NALOXONE HYDROCHLORIDE 0.4 MG/ML
0.2 INJECTION, SOLUTION INTRAMUSCULAR; INTRAVENOUS; SUBCUTANEOUS
Status: DISCONTINUED | OUTPATIENT
Start: 2021-05-15 | End: 2021-05-26 | Stop reason: HOSPADM

## 2021-05-15 RX ORDER — DEXAMETHASONE 1 MG
1 TABLET ORAL DAILY
Qty: 3 TABLET | Refills: 0 | Status: ON HOLD | OUTPATIENT
Start: 2021-05-17 | End: 2021-05-23

## 2021-05-15 RX ORDER — DEXAMETHASONE 1 MG
2 TABLET ORAL
Status: COMPLETED | OUTPATIENT
Start: 2021-05-16 | End: 2021-05-16

## 2021-05-15 RX ORDER — SIMETHICONE 40MG/0.6ML
40 SUSPENSION, DROPS(FINAL DOSAGE FORM)(ML) ORAL EVERY 6 HOURS PRN
Status: DISCONTINUED | OUTPATIENT
Start: 2021-05-15 | End: 2021-05-26 | Stop reason: HOSPADM

## 2021-05-15 RX ORDER — ONDANSETRON 2 MG/ML
4 INJECTION INTRAMUSCULAR; INTRAVENOUS EVERY 6 HOURS PRN
Status: DISCONTINUED | OUTPATIENT
Start: 2021-05-15 | End: 2021-05-26 | Stop reason: HOSPADM

## 2021-05-15 RX ORDER — LEVETIRACETAM 750 MG/1
750 TABLET ORAL 2 TIMES DAILY
Qty: 60 TABLET | Refills: 0 | Status: ON HOLD | DISCHARGE
Start: 2021-05-15 | End: 2021-06-04

## 2021-05-15 RX ORDER — QUETIAPINE FUMARATE 25 MG/1
50 TABLET, FILM COATED ORAL AT BEDTIME
Status: DISCONTINUED | OUTPATIENT
Start: 2021-05-15 | End: 2021-05-16

## 2021-05-15 RX ORDER — ONDANSETRON 4 MG/1
4 TABLET, ORALLY DISINTEGRATING ORAL EVERY 6 HOURS PRN
Status: DISCONTINUED | OUTPATIENT
Start: 2021-05-15 | End: 2021-05-26 | Stop reason: HOSPADM

## 2021-05-15 RX ORDER — SENNOSIDES 8.6 MG
1 TABLET ORAL 2 TIMES DAILY PRN
Qty: 30 TABLET | Refills: 0 | DISCHARGE
Start: 2021-05-15 | End: 2021-12-22

## 2021-05-15 RX ORDER — SULFAMETHOXAZOLE AND TRIMETHOPRIM 400; 80 MG/1; MG/1
1 TABLET ORAL DAILY
Status: DISCONTINUED | OUTPATIENT
Start: 2021-05-16 | End: 2021-05-26 | Stop reason: HOSPADM

## 2021-05-15 RX ORDER — CARBOXYMETHYLCELLULOSE SODIUM 5 MG/ML
1 SOLUTION/ DROPS OPHTHALMIC
Status: DISCONTINUED | OUTPATIENT
Start: 2021-05-15 | End: 2021-05-26 | Stop reason: HOSPADM

## 2021-05-15 RX ORDER — NICOTINE POLACRILEX 4 MG
15-30 LOZENGE BUCCAL
Status: DISCONTINUED | OUTPATIENT
Start: 2021-05-15 | End: 2021-05-26 | Stop reason: HOSPADM

## 2021-05-15 RX ORDER — POLYETHYLENE GLYCOL 3350 17 G/17G
17 POWDER, FOR SOLUTION ORAL DAILY PRN
Status: DISCONTINUED | OUTPATIENT
Start: 2021-05-16 | End: 2021-05-26 | Stop reason: HOSPADM

## 2021-05-15 RX ORDER — SULFAMETHOXAZOLE AND TRIMETHOPRIM 400; 80 MG/1; MG/1
1 TABLET ORAL DAILY
Qty: 45 TABLET | Refills: 0 | DISCHARGE
Start: 2021-05-15 | End: 2021-08-13

## 2021-05-15 RX ORDER — QUETIAPINE FUMARATE 25 MG/1
25 TABLET, FILM COATED ORAL 2 TIMES DAILY
Qty: 30 TABLET | Refills: 0 | DISCHARGE
Start: 2021-05-15 | End: 2021-09-10

## 2021-05-15 RX ORDER — DEXAMETHASONE 1 MG
1 TABLET ORAL DAILY
Status: DISCONTINUED | OUTPATIENT
Start: 2021-05-17 | End: 2021-05-16

## 2021-05-15 RX ORDER — CALCIUM CARBONATE 500 MG/1
1000 TABLET, CHEWABLE ORAL 4 TIMES DAILY PRN
Status: DISCONTINUED | OUTPATIENT
Start: 2021-05-15 | End: 2021-05-26 | Stop reason: HOSPADM

## 2021-05-15 RX ORDER — POLYETHYLENE GLYCOL 3350 17 G/17G
17 POWDER, FOR SOLUTION ORAL DAILY PRN
Qty: 510 G | Refills: 0 | Status: ON HOLD | OUTPATIENT
Start: 2021-05-15 | End: 2022-04-05

## 2021-05-15 RX ORDER — PANTOPRAZOLE SODIUM 40 MG/1
40 TABLET, DELAYED RELEASE ORAL
Status: DISCONTINUED | OUTPATIENT
Start: 2021-05-16 | End: 2021-05-16

## 2021-05-15 RX ORDER — OXYCODONE HYDROCHLORIDE 5 MG/1
2.5 TABLET ORAL 3 TIMES DAILY
Qty: 10 TABLET | Refills: 0 | Status: ON HOLD | DISCHARGE
Start: 2021-05-15 | End: 2021-06-04

## 2021-05-15 RX ORDER — ALBUTEROL SULFATE 90 UG/1
2 AEROSOL, METERED RESPIRATORY (INHALATION) EVERY 4 HOURS PRN
Status: DISCONTINUED | OUTPATIENT
Start: 2021-05-15 | End: 2021-05-26 | Stop reason: HOSPADM

## 2021-05-15 RX ORDER — LEVETIRACETAM 750 MG/1
750 TABLET ORAL 2 TIMES DAILY
Status: DISCONTINUED | OUTPATIENT
Start: 2021-05-15 | End: 2021-05-16

## 2021-05-15 RX ORDER — DEXTROSE MONOHYDRATE 25 G/50ML
25-50 INJECTION, SOLUTION INTRAVENOUS
Status: DISCONTINUED | OUTPATIENT
Start: 2021-05-15 | End: 2021-05-26 | Stop reason: HOSPADM

## 2021-05-15 RX ORDER — LORAZEPAM 0.5 MG/1
0.5 TABLET ORAL EVERY 4 HOURS PRN
Status: DISCONTINUED | OUTPATIENT
Start: 2021-05-15 | End: 2021-05-26 | Stop reason: HOSPADM

## 2021-05-15 RX ORDER — DEXAMETHASONE 2 MG/1
2 TABLET ORAL DAILY
Qty: 1 TABLET | Refills: 0 | Status: ON HOLD | OUTPATIENT
Start: 2021-05-16 | End: 2021-05-23

## 2021-05-15 RX ORDER — BUSPIRONE HYDROCHLORIDE 10 MG/1
30 TABLET ORAL 2 TIMES DAILY
Status: DISCONTINUED | OUTPATIENT
Start: 2021-05-15 | End: 2021-05-16

## 2021-05-15 RX ORDER — ALBUTEROL SULFATE 0.83 MG/ML
2.5 SOLUTION RESPIRATORY (INHALATION) EVERY 4 HOURS PRN
Status: DISCONTINUED | OUTPATIENT
Start: 2021-05-15 | End: 2021-05-26 | Stop reason: HOSPADM

## 2021-05-15 RX ORDER — FUROSEMIDE 40 MG
40 TABLET ORAL DAILY
Status: DISCONTINUED | OUTPATIENT
Start: 2021-05-16 | End: 2021-05-16

## 2021-05-15 RX ORDER — BISACODYL 10 MG
10 SUPPOSITORY, RECTAL RECTAL DAILY PRN
Status: DISCONTINUED | OUTPATIENT
Start: 2021-05-15 | End: 2021-05-26 | Stop reason: HOSPADM

## 2021-05-15 RX ORDER — FUROSEMIDE 40 MG
40 TABLET ORAL DAILY
Qty: 30 TABLET | Refills: 0 | DISCHARGE
Start: 2021-05-15 | End: 2022-04-18

## 2021-05-15 RX ORDER — POTASSIUM CHLORIDE 29.8 MG/ML
20 INJECTION INTRAVENOUS
Status: COMPLETED | OUTPATIENT
Start: 2021-05-15 | End: 2021-05-15

## 2021-05-15 RX ORDER — DEXAMETHASONE 2 MG/1
2 TABLET ORAL
Qty: 2 TABLET | Refills: 0 | Status: ON HOLD | DISCHARGE
Start: 2021-05-15 | End: 2021-05-23

## 2021-05-15 RX ORDER — NALOXONE HYDROCHLORIDE 0.4 MG/ML
0.4 INJECTION, SOLUTION INTRAMUSCULAR; INTRAVENOUS; SUBCUTANEOUS
Status: DISCONTINUED | OUTPATIENT
Start: 2021-05-15 | End: 2021-05-26 | Stop reason: HOSPADM

## 2021-05-15 RX ORDER — OXYCODONE HYDROCHLORIDE 5 MG/1
2.5-5 TABLET ORAL EVERY 4 HOURS PRN
Qty: 10 TABLET | Refills: 0 | Status: ON HOLD | DISCHARGE
Start: 2021-05-15 | End: 2021-06-04

## 2021-05-15 RX ORDER — PANTOPRAZOLE SODIUM 40 MG/1
40 TABLET, DELAYED RELEASE ORAL
Qty: 30 TABLET | Refills: 0 | DISCHARGE
Start: 2021-05-15

## 2021-05-15 RX ORDER — DOXEPIN HYDROCHLORIDE 10 MG/ML
3 SOLUTION ORAL AT BEDTIME
Status: DISCONTINUED | OUTPATIENT
Start: 2021-05-15 | End: 2021-05-16

## 2021-05-15 RX ORDER — LORAZEPAM 0.5 MG/1
0.5 TABLET ORAL EVERY 4 HOURS PRN
Qty: 12 TABLET | Refills: 0 | Status: ON HOLD | DISCHARGE
Start: 2021-05-15 | End: 2021-07-15

## 2021-05-15 RX ORDER — MESALAMINE 1000 MG/1
1000 SUPPOSITORY RECTAL AT BEDTIME
Status: DISCONTINUED | OUTPATIENT
Start: 2021-05-15 | End: 2021-05-16

## 2021-05-15 RX ADMIN — QUETIAPINE FUMARATE 25 MG: 25 TABLET ORAL at 20:11

## 2021-05-15 RX ADMIN — Medication 2.5 MG: at 20:11

## 2021-05-15 RX ADMIN — FLUOXETINE HYDROCHLORIDE 60 MG: 40 CAPSULE ORAL at 09:13

## 2021-05-15 RX ADMIN — FUROSEMIDE 40 MG: 40 TABLET ORAL at 09:12

## 2021-05-15 RX ADMIN — PANTOPRAZOLE SODIUM 40 MG: 40 TABLET, DELAYED RELEASE ORAL at 09:12

## 2021-05-15 RX ADMIN — ENOXAPARIN SODIUM 40 MG: 100 INJECTION SUBCUTANEOUS at 16:14

## 2021-05-15 RX ADMIN — POTASSIUM CHLORIDE 20 MEQ: 29.8 INJECTION, SOLUTION INTRAVENOUS at 09:10

## 2021-05-15 RX ADMIN — QUETIAPINE 25 MG: 25 TABLET ORAL at 09:13

## 2021-05-15 RX ADMIN — Medication 2.5 MG: at 09:13

## 2021-05-15 RX ADMIN — ACETAMINOPHEN 975 MG: 325 TABLET, FILM COATED ORAL at 14:21

## 2021-05-15 RX ADMIN — POLYETHYLENE GLYCOL 3350 17 G: 17 POWDER, FOR SOLUTION ORAL at 09:12

## 2021-05-15 RX ADMIN — QUETIAPINE FUMARATE 50 MG: 25 TABLET ORAL at 22:15

## 2021-05-15 RX ADMIN — LINACLOTIDE 290 MCG: 145 CAPSULE, GELATIN COATED ORAL at 09:24

## 2021-05-15 RX ADMIN — DEXAMETHASONE 2 MG: 2 TABLET ORAL at 09:12

## 2021-05-15 RX ADMIN — INSULIN ASPART 1 UNITS: 100 INJECTION, SOLUTION INTRAVENOUS; SUBCUTANEOUS at 12:53

## 2021-05-15 RX ADMIN — BUSPIRONE HYDROCHLORIDE 30 MG: 15 TABLET ORAL at 09:13

## 2021-05-15 RX ADMIN — POTASSIUM CHLORIDE 20 MEQ: 29.8 INJECTION, SOLUTION INTRAVENOUS at 10:56

## 2021-05-15 RX ADMIN — Medication 2.5 MG: at 14:20

## 2021-05-15 RX ADMIN — LEVETIRACETAM 750 MG: 750 TABLET, FILM COATED ORAL at 09:12

## 2021-05-15 RX ADMIN — SULFAMETHOXAZOLE AND TRIMETHOPRIM 1 TABLET: 400; 80 TABLET ORAL at 09:13

## 2021-05-15 RX ADMIN — DOXEPIN HYDROCHLORIDE 3 MG: 10 SOLUTION ORAL at 22:16

## 2021-05-15 RX ADMIN — SODIUM PHOSPHATE, MONOBASIC, MONOHYDRATE 9 MMOL: 276; 142 INJECTION, SOLUTION INTRAVENOUS at 09:11

## 2021-05-15 RX ADMIN — AMLODIPINE BESYLATE 10 MG: 10 TABLET ORAL at 09:12

## 2021-05-15 RX ADMIN — LEVETIRACETAM 750 MG: 750 TABLET, FILM COATED ORAL at 20:11

## 2021-05-15 RX ADMIN — BUSPIRONE HYDROCHLORIDE 30 MG: 10 TABLET ORAL at 20:11

## 2021-05-15 RX ADMIN — PANTOPRAZOLE SODIUM 40 MG: 40 TABLET, DELAYED RELEASE ORAL at 16:14

## 2021-05-15 RX ADMIN — MESALAMINE 1000 MG: 1000 SUPPOSITORY RECTAL at 22:41

## 2021-05-15 RX ADMIN — ACETAMINOPHEN 975 MG: 325 TABLET, FILM COATED ORAL at 09:12

## 2021-05-15 ASSESSMENT — ACTIVITIES OF DAILY LIVING (ADL)
ADLS_ACUITY_SCORE: 19
ADLS_ACUITY_SCORE: 18
ADLS_ACUITY_SCORE: 19

## 2021-05-15 NOTE — PROGRESS NOTES
s-pt with covid neg test. Pt advanced diet of mech soft was not tolerated well.-coughed with attempt to swallow ground meat item. Did well with ensure and pudding.  Wants to resume pureed diet- this was ordered by rn. Pt needs reminding to swallow with chin to chest. Had one stool soft pastey. No pain- turns well in bed with assist to reposition q2hrs. purix for urine collection keeping pt dry.Did not want to do mouth care at hs-Seizure pads placed for the loni and contiuous o2 sat monitoring per order. Pt presently sleeping well. conversive and appropriate - forgetful not remembering if ordered loni meal.   b-pt with rd tx for glioblastoma  A-pt stable in need of physical care support.  b-pt needing diet change for swallowing food items smoother that mech soft per pt request.-pureed diet ordered. freq hrly checks and turning q2hrs. Conversation for socializing and assessment of mentation. F/u if seizure pads to continue and continuous o2 sat monitoiring.are needed to continue. Possible transition to TCU.

## 2021-05-15 NOTE — PLAN OF CARE
Patient slept at intervals. Awakens to voice. Repositioned in bed ,from side to side with assist of two staff. Using purwick external catheter to suction. Voiding foamy yellow urine. No stool overnight. Denies pain. Afebrile. Vitals stable. Sats ok on room air. Seizure precautions ,padded side rails.  No seizure activity observed. Triple lumen PICC line saline locked. Minimal oral intake overnight. Aspiration risk. She needs KCL replaced per IV route. Electrolyte protocol  20 mEq  X 2 doses with recheck one hour after last dose finished. Also needs another dose of Sodium Phos. 9 mmol.IV; Ordered and pending from pharmacy.

## 2021-05-15 NOTE — PROGRESS NOTES
Care Management Discharge Note    Discharge Date: 05/15/21       Discharge Disposition: Transitional Care    Discharge Services: Rehab    Discharge DME: None    Discharge Transportation:  [employer] ([phone]) will provide a wheelchair ride at 4:00 PM today.     Private pay costs discussed: Not by this writer.     PAS Confirmation Code:  [id number]  Patient/family educated on Medicare website which has current facility and service quality ratings: yes(provided over the wknd by [employer] LYON. Previously pt/dtr had requested referral to [facility])    Education Provided on the Discharge Plan:  Yes  Persons Notified of Discharge Plans: Maximilian 3 team, . 5C nursing; patient  Patient/Family in Agreement with the Plan: Yes  Handoff Referral Completed: Yes    Additional Information:  [facility] has a bed for  today. Her COVID test is negative, and  from psychiatry has documented her criteria for Seroquel use. Please contact social work with any questions/concerns related to the above discharge plan.     MACARIO Freeman, OPAL  Casual     Searchable on [employer] SOCIAL WORK - for text paging options    Saturday & Sunday & Holidays  On-Call Pager [phone]    4A 4C 4E 5A 5B [phone]    6A 6B 6C 6D  [phone]    5C 7A 7B 7C 7D [phone]    For Hours 1600-Midnight Pager [phone]        MACARIO Freeman, OPAL  Casual     Searchable on [employer] SOCIAL WORK - for text paging options    Saturday & Sunday & Holidays  On-Call Pager [phone]    4A 4C 4E 5A 5B [phone]    6A 6B 6C 6D  [phone]    5C 7A 7B 7C 7D [phone]    For Hours 1600-Midnight Pager [phone]

## 2021-05-15 NOTE — PLAN OF CARE
Patient is a 75 year old female admitted to room 426 via wheelchair. Patient is alert and oriented X 3. See Epic for VS and assessment. Patient denies generalized pain, chest pain, HA, SOB, N/V on admission. Patient is able to transfer with assist of 2 or lift. Patient was settled into their room, shown call light, tv, mealtimes etc. Tremors noted on extremities baseline per patient. Oriented to unit. Pure wick in place. Notifying MD with any concerns. Follow MD orders for cares and medications.    Level of Schooling: College-Retired RN  Ethnicity: [place]   Marital Status:   Vascular Access: none  Dentures: none  Smoker: no   Glasses: yes   Occupation: disability   Falls 0-1 mo:1

## 2021-05-15 NOTE — PLAN OF CARE
Patient given IV phos and IV K, recheck on K was 4.1. Declined a bath today, stated she showered yesterday. Continent of BM, but not of urine. Used purewick for most of shift, removed when up to chair with assist of two. Patient wanted diet changed back to DD1 from mechanical soft. Independent with meals after setup. Report called to Olya Lyman RN at [facility]. Wheel chair transport setup for 1600.

## 2021-05-16 LAB
ANION GAP SERPL CALCULATED.3IONS-SCNC: 4 MMOL/L (ref 3–14)
BUN SERPL-MCNC: 20 MG/DL (ref 7–30)
CALCIUM SERPL-MCNC: 8.8 MG/DL (ref 8.5–10.1)
CHLORIDE SERPL-SCNC: 107 MMOL/L (ref 94–109)
CO2 SERPL-SCNC: 30 MMOL/L (ref 20–32)
CREAT SERPL-MCNC: 0.48 MG/DL (ref 0.52–1.04)
ERYTHROCYTE [DISTWIDTH] IN BLOOD BY AUTOMATED COUNT: 19.4 % (ref 10–15)
GFR SERPL CREATININE-BSD FRML MDRD: >90 ML/MIN/{1.73_M2}
GLUCOSE BLDC GLUCOMTR-MCNC: 128 MG/DL (ref 70–99)
GLUCOSE BLDC GLUCOMTR-MCNC: 147 MG/DL (ref 70–99)
GLUCOSE BLDC GLUCOMTR-MCNC: 159 MG/DL (ref 70–99)
GLUCOSE BLDC GLUCOMTR-MCNC: 92 MG/DL (ref 70–99)
GLUCOSE SERPL-MCNC: 81 MG/DL (ref 70–99)
HCT VFR BLD AUTO: 32.9 % (ref 35–47)
HGB BLD-MCNC: 10.5 G/DL (ref 11.7–15.7)
MCH RBC QN AUTO: 30.4 PG (ref 26.5–33)
MCHC RBC AUTO-ENTMCNC: 31.9 G/DL (ref 31.5–36.5)
MCV RBC AUTO: 95 FL (ref 78–100)
PLATELET # BLD AUTO: 242 10E9/L (ref 150–450)
POTASSIUM SERPL-SCNC: 3.6 MMOL/L (ref 3.4–5.3)
RBC # BLD AUTO: 3.45 10E12/L (ref 3.8–5.2)
SODIUM SERPL-SCNC: 141 MMOL/L (ref 133–144)
WBC # BLD AUTO: 7.6 10E9/L (ref 4–11)

## 2021-05-16 PROCEDURE — 97110 THERAPEUTIC EXERCISES: CPT | Mod: GP | Performed by: PHYSICAL THERAPIST

## 2021-05-16 PROCEDURE — 250N000013 HC RX MED GY IP 250 OP 250 PS 637: Performed by: INTERNAL MEDICINE

## 2021-05-16 PROCEDURE — 97162 PT EVAL MOD COMPLEX 30 MIN: CPT | Mod: GP | Performed by: PHYSICAL THERAPIST

## 2021-05-16 PROCEDURE — 120N000009 HC R&B SNF

## 2021-05-16 PROCEDURE — 250N000011 HC RX IP 250 OP 636: Performed by: INTERNAL MEDICINE

## 2021-05-16 PROCEDURE — 999N001017 HC STATISTIC GLUCOSE BY METER IP

## 2021-05-16 PROCEDURE — 86481 TB AG RESPONSE T-CELL SUSP: CPT | Performed by: INTERNAL MEDICINE

## 2021-05-16 PROCEDURE — 97535 SELF CARE MNGMENT TRAINING: CPT | Mod: GO

## 2021-05-16 PROCEDURE — 97112 NEUROMUSCULAR REEDUCATION: CPT | Mod: GP | Performed by: PHYSICAL THERAPIST

## 2021-05-16 PROCEDURE — 99306 1ST NF CARE HIGH MDM 50: CPT | Performed by: INTERNAL MEDICINE

## 2021-05-16 PROCEDURE — 250N000012 HC RX MED GY IP 250 OP 636 PS 637: Performed by: INTERNAL MEDICINE

## 2021-05-16 PROCEDURE — 97165 OT EVAL LOW COMPLEX 30 MIN: CPT | Mod: GO

## 2021-05-16 PROCEDURE — 97530 THERAPEUTIC ACTIVITIES: CPT | Mod: GP | Performed by: PHYSICAL THERAPIST

## 2021-05-16 PROCEDURE — 80048 BASIC METABOLIC PNL TOTAL CA: CPT | Performed by: INTERNAL MEDICINE

## 2021-05-16 PROCEDURE — 36415 COLL VENOUS BLD VENIPUNCTURE: CPT | Performed by: INTERNAL MEDICINE

## 2021-05-16 PROCEDURE — 85027 COMPLETE CBC AUTOMATED: CPT | Performed by: INTERNAL MEDICINE

## 2021-05-16 RX ORDER — QUETIAPINE FUMARATE 25 MG/1
50 TABLET, FILM COATED ORAL AT BEDTIME
Status: DISCONTINUED | OUTPATIENT
Start: 2021-05-16 | End: 2021-05-26 | Stop reason: HOSPADM

## 2021-05-16 RX ORDER — LEVETIRACETAM 750 MG/1
750 TABLET ORAL 2 TIMES DAILY
Status: DISCONTINUED | OUTPATIENT
Start: 2021-05-16 | End: 2021-05-26 | Stop reason: HOSPADM

## 2021-05-16 RX ORDER — DOXEPIN HYDROCHLORIDE 10 MG/ML
3 SOLUTION ORAL AT BEDTIME
Status: DISCONTINUED | OUTPATIENT
Start: 2021-05-16 | End: 2021-05-26 | Stop reason: HOSPADM

## 2021-05-16 RX ORDER — BUSPIRONE HYDROCHLORIDE 10 MG/1
30 TABLET ORAL 2 TIMES DAILY
Status: DISCONTINUED | OUTPATIENT
Start: 2021-05-16 | End: 2021-05-26 | Stop reason: HOSPADM

## 2021-05-16 RX ORDER — AMLODIPINE BESYLATE 5 MG/1
5 TABLET ORAL DAILY
Status: DISCONTINUED | OUTPATIENT
Start: 2021-05-17 | End: 2021-05-26 | Stop reason: HOSPADM

## 2021-05-16 RX ORDER — DEXAMETHASONE 1 MG
1 TABLET ORAL DAILY
Status: DISCONTINUED | OUTPATIENT
Start: 2021-05-17 | End: 2021-05-22

## 2021-05-16 RX ORDER — MESALAMINE 1000 MG/1
1000 SUPPOSITORY RECTAL AT BEDTIME
Status: DISCONTINUED | OUTPATIENT
Start: 2021-05-16 | End: 2021-05-18

## 2021-05-16 RX ORDER — FUROSEMIDE 40 MG
40 TABLET ORAL DAILY
Status: DISCONTINUED | OUTPATIENT
Start: 2021-05-17 | End: 2021-05-19

## 2021-05-16 RX ORDER — PANTOPRAZOLE SODIUM 40 MG/1
40 TABLET, DELAYED RELEASE ORAL
Status: DISCONTINUED | OUTPATIENT
Start: 2021-05-16 | End: 2021-05-26 | Stop reason: HOSPADM

## 2021-05-16 RX ORDER — QUETIAPINE FUMARATE 25 MG/1
25 TABLET, FILM COATED ORAL 2 TIMES DAILY
Status: DISCONTINUED | OUTPATIENT
Start: 2021-05-16 | End: 2021-05-26 | Stop reason: HOSPADM

## 2021-05-16 RX ADMIN — BUSPIRONE HYDROCHLORIDE 30 MG: 10 TABLET ORAL at 08:14

## 2021-05-16 RX ADMIN — PANTOPRAZOLE SODIUM 40 MG: 40 TABLET, DELAYED RELEASE ORAL at 16:25

## 2021-05-16 RX ADMIN — FUROSEMIDE 40 MG: 40 TABLET ORAL at 08:14

## 2021-05-16 RX ADMIN — LEVETIRACETAM 750 MG: 750 TABLET, FILM COATED ORAL at 21:28

## 2021-05-16 RX ADMIN — BUSPIRONE HYDROCHLORIDE 30 MG: 10 TABLET ORAL at 21:29

## 2021-05-16 RX ADMIN — DEXAMETHASONE 2 MG: 1 TABLET ORAL at 08:15

## 2021-05-16 RX ADMIN — SULFAMETHOXAZOLE AND TRIMETHOPRIM 1 TABLET: 400; 80 TABLET ORAL at 08:14

## 2021-05-16 RX ADMIN — Medication 2.5 MG: at 20:26

## 2021-05-16 RX ADMIN — DOXEPIN HYDROCHLORIDE 3 MG: 10 SOLUTION ORAL at 22:25

## 2021-05-16 RX ADMIN — LEVETIRACETAM 750 MG: 750 TABLET, FILM COATED ORAL at 08:15

## 2021-05-16 RX ADMIN — MESALAMINE 1000 MG: 1000 SUPPOSITORY RECTAL at 22:25

## 2021-05-16 RX ADMIN — INSULIN ASPART 1 UNITS: 100 INJECTION, SOLUTION INTRAVENOUS; SUBCUTANEOUS at 12:46

## 2021-05-16 RX ADMIN — QUETIAPINE FUMARATE 25 MG: 25 TABLET ORAL at 17:27

## 2021-05-16 RX ADMIN — ENOXAPARIN SODIUM 40 MG: 40 INJECTION SUBCUTANEOUS at 16:25

## 2021-05-16 RX ADMIN — PANTOPRAZOLE SODIUM 40 MG: 40 TABLET, DELAYED RELEASE ORAL at 06:12

## 2021-05-16 RX ADMIN — Medication 2.5 MG: at 08:14

## 2021-05-16 RX ADMIN — FLUOXETINE 60 MG: 20 CAPSULE ORAL at 08:14

## 2021-05-16 RX ADMIN — QUETIAPINE FUMARATE 25 MG: 25 TABLET ORAL at 08:15

## 2021-05-16 RX ADMIN — LINACLOTIDE 290 MCG: 145 CAPSULE, GELATIN COATED ORAL at 06:12

## 2021-05-16 RX ADMIN — Medication 2.5 MG: at 14:25

## 2021-05-16 RX ADMIN — QUETIAPINE FUMARATE 50 MG: 25 TABLET ORAL at 22:25

## 2021-05-16 NOTE — PLAN OF CARE
Physical Therapy Discharge Summary    Reason for therapy discharge:    Discharged to transitional care facility.    Progress towards therapy goal(s). See goals on Care Plan in [facility] electronic health record for goal details.  Goals partially met.  Barriers to achieving goals:   discharge from facility.    Therapy recommendation(s):    Continued therapy is recommended.  Rationale/Recommendations:  Will benefit from continued rehab in TCU setting.

## 2021-05-16 NOTE — PLAN OF CARE
Speech Language Therapy Discharge Summary    Reason for therapy discharge:    Discharged to transitional care facility.    Progress towards therapy goal(s). See goals on Care Plan in [facility] electronic health record for goal details.  Goals partially met.  Barriers to achieving goals:   discharge from facility.    Therapy recommendation(s):    Continued therapy is recommended.  Rationale/Recommendations:  Recommend continue dysphagia diet level 2 with nectar thick liquids given close supervision and assist as needed. Patient will need assistance with meal set up and positioning upright for meals. Pt should be fully alert and upright for all po and take small bites/sips at slow rate. Hold PO if too lethargic.  Pt with aspiration with thin liquids on 4/28/2021, recommend repeat VFSS in ~2 weeks or as appropriate per progress in tx prior to liquid advancement.  Pt is okay to advance solid textures per bedside assessment..  Per RN/MD note from 5/14/21, diet downgraded to dysphagia diet 1 and thin liquids per pt request.  SLP to follow

## 2021-05-16 NOTE — PROGRESS NOTES
CLINICAL NUTRITION SERVICES - ASSESSMENT NOTE     Nutrition Prescription    RECOMMENDATIONS FOR MDs/PROVIDERS TO ORDER:  -none at this time     Malnutrition Status:     Patient does not meet two of the established criteria necessary for diagnosing malnutrition but is at risk for malnutrition    Recommendations already ordered by Registered Dietitian (RD):  -calorie counts beginning 5.17.21 x 72 hours  -continue ordered MFS: Ensure Enlive (nectar thickened) 2pm, Magic Cup at dinner  -pt allowed to order additional MFS PRN      Future/Additional Recommendations:  -continue to monitor and encourage adequate oral intake       REASON FOR ASSESSMENT  Olga Bailey is a/an 75 year old female assessed by the dietitian for Provider Order - malnutrition, supplements    NUTRITION HISTORY  Information obtained from EMR:     -admitted to [facility] on 4.26.21 for Pneumocystis jirovecii pneumonia with acute hypoxic respiratory failure. Now at TCU for ongoing recovery  -active problems: glioblastoma multiforme, major depressive disorder-recurrent, anxiety, steroid induced hyperglycemia (H), HTN, GERD  -radiation therapy; continues 5.21.21    -required TPN until 5.6.21- pt was consuming adequate kcals, Pro orally to meet needs prior to discharge from [facility]    -non-severe malnutrition (H) during last hospital admission (with improvement in PO intake and no additional wt loss other than fluid shifts- pt on lasix with edema trace)    RD Note: 5.11.21:  Diet: Dysphagia Level 1:  Pureed with Nectar Thickened liquids  --Ensure enlive @ 2PM and magic cups with dinner, additional ok PRN     Nutrition Support: None at this time  --Previously on TPN at OSH  --Remained on TPN from 4/27-5/6 in house     Intake: Eating % intakes per food records  --Per healthtouch records, pt is ordering well: average of 1970 kcal and 95 g protein  --Even if eating 75% of meals, pt is taking in >100% of estimated needs       -all information gathered from  "chart review    CURRENT NUTRITION ORDERS  Diet: Orders Placed This Encounter      Dysphagia Diet Level 1 Pureed Nectar Thickened Liquids (pre-thickened or use instant food thickener)      Intake/Tolerance: nothing yet recorded    LABS  Labs reviewed  Sodium (mmol/L)   Date Value   05/16/2021 141     Potassium (mmol/L)   Date Value   05/16/2021 3.6     Creatinine (mg/dL)   Date Value   05/16/2021 0.48 (L)     GFR Estimate (mL/min/[1.73_m2])   Date Value   05/16/2021 >90     Glucose (mg/dL)   Date Value   05/16/2021 159 (H)     Hemoglobin (g/dL)   Date Value   05/16/2021 10.5 (L)         MEDICATIONS  Medications reviewed  Current Facility-Administered Medications   Medication     busPIRone (BUSPAR)      [START ON 5/17/2021] dexamethasone (DECADRON)      doxepin (SINEquan) 10 MG/ML (HIGH CONC)      enoxaparin ANTICOAGULANT (LOVENOX)      [START ON 5/17/2021] FLUoxetine (PROzac)      [START ON 5/17/2021] furosemide (LASIX)      insulin aspart (NovoLOG) injection (RAPID ACTING)     levETIRAcetam (KEPPRA)      [START ON 5/17/2021] linaclotide (LINZESS)      mesalamine (CANASA)      pantoprazole (PROTONIX)      QUEtiapine (SEROquel)          ANTHROPOMETRICS  Height: 5' 3\"  Most Recent Weight: 70.3 kg (155 lb)    IBW: 52.2 kg  BMI: 27.46 kg/m^2   Overweight BMI 25-29.9  Weight History:   Wt Readings from Last 8 Encounters:   05/15/21 70.3 kg (155 lb)   05/10/21 67.1 kg (147 lb 14.9 oz)   04/26/21 74.4 kg (164 lb 0.4 oz)   03/23/21 70 kg (154 lb 6.4 oz)   03/08/21 69.9 kg (154 lb)   02/26/21 74.4 kg (164 lb 1.6 oz)   11/20/15 68 kg (150 lb)   01/22/08 69.9 kg (154 lb)   -wt stable compared to 2 months ago  -9 lb wt loss in 12 weeks (5.5%) - not clinically significant  -9 lb wt loss in 3 weeks - fluid shifts for past 4 wts   -8 lb wt gain in 5 days- fluid shifts for past 4 wts      Dosing Weight: 56.7 kg (adjusted BW)    ASSESSED NUTRITION NEEDS  Estimated Energy Needs: 9399-6956 kcals/day (25 - 30 kcals/kg)  Justification: " Maintenance and Overweight  Estimated Protein Needs: 57-68 grams protein/day (1 - 1.2 grams of pro/kg)  Justification: overweight, recent TPN and inadequate PO intake, and Repletion  Estimated Fluid Needs: 8560-7310 mL/day (1 mL/kcal)   Justification: Maintenance and Per provider pending fluid status    PHYSICAL FINDINGS  See malnutrition section below.  -no NFPA as RD is off-site (NFPA info below gathered from recent RD note in EMR)    MALNUTRITION  % Intake: Decreased intake does not meet criteria  % Weight Loss: Weight loss does not meet criteria  *Subcutaneous Fat Loss:Facial region: Mild (visual) --per RD visit 5/4  *Muscle Loss: Upper leg (quadricep, hamstring):  mild and Posterior calf:  Mild (visual) -- per RD visit 5/4  Fluid Accumulation/Edema: 1+ (trace) dependent   Malnutrition Diagnosis: Patient does not meet two of the established criteria necessary for diagnosing malnutrition but is at risk for malnutrition    NUTRITION DIAGNOSIS  Predicted inadequate nutrient intake- Kcal, protein related to swallowing difficulty as evidenced by dysphagia 1 diet order with nectar thickened liquids, variable PO intake since transition from TPN on 5.6.21, reliance of MFS to complete adequate kcal/protein goals during last hospital admission      INTERVENTIONS  Implementation    Collaboration with other providers  Medical food supplement therapy  Nutrition education for nutrition relationship to health/disease (future)    Goals  Patient to consume % of nutritionally adequate meal trays TID, or the equivalent with supplements/snacks.     Monitoring/Evaluation  Progress toward goals will be monitored and evaluated per protocol, weight, food intake, MFS usage        Betsy Lakhani MBA, RD LD  Clinical Dietitian  [facility] office [phone]  on-call weekend pager [phone]
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
## 2021-05-16 NOTE — PLAN OF CARE
Discharge Planner Post-Acute Rehab OT:     Discharge Plan: discharge to dtr's home, pt will need to be home alone intermittently, home health and home OT    Precautions: weakness RUE, poor midline sitting unsupported, falls    Current Status:  ADLs: Pt requires set-up for gr/hyg and oral cares, min A UB dressing supported seated in bed, max A LB dressing, mod A supine to sit w/ HOB raised and use of side rail, max A STS which was tiring for pt.  IADLs: Pt had been IND with IADLs PTA.  Vision/Cognition: Pt wears glasses and notes a change in her cognition.  She is partially oriented and follows directions.     Assessment: Pt pleasant, motivated and is a good candidate for skilled OT.  She was motivated at her evaluation and agrees with POC.  Pt is performing well below her baseline at present as she reports she was active and IND PTA.  Currently she is needing assist with her ADLs, bed mobility and STS.  Recommend OT 6x/wk anticipating home with dtr in about 2 and 1/2 weeks pending progress and ability to home alone for a period of time while dtr is not available.  Con't OT per POC.    Other Barriers to Discharge (DME, Family Training, etc): Family training, consider AE for bathroom. Pt will be on the main floor at dtr's home for kitching/living/bathroom but her bed is upstairs.  Dtr has tub/shower upstairs, toilet on the main floor and walk in shower on the lower level.  Pt is used to a high toilet at her own house and a walk in shower with grab bars.

## 2021-05-16 NOTE — PLAN OF CARE
Patient's most recent vital signs are:     Vital signs:  BP: 135/70  Temp: 98.1  HR: 82  RR: 16  SpO2: 94 %     Patient does not have new respiratory symptoms.  Patient does not have new sore throat.  Patient does not have a fever greater than 99.5.    Alert and correctly oriented and verbalizes needs. Very pleasant. Requesting DD1 diet, as states she has trouble swallowing DD2 diet. Nectar thick liquids. MD saw patient and changed diet order. Started on calorie counts. She also has a SLP consult in orders. Up in chair this afternoon. Nursing to use liko lift for transfer. BG's-92, 159 today. Sliding scale insulin with lunch. Uses purewick.

## 2021-05-16 NOTE — PLAN OF CARE
Discharge Planner Post-Acute Rehab PT:     Discharge Plan: Discharge to [facility] home with home PT at mod I with fww    Precautions: Fall precaution    Current Status:  Bed Mobility: Needs mod-max A for getting to/from edge of bed  Transfer: Liko or Ax2 for SPT with fww  Gait: Not tested at this time due to fatigue and weakness  Stairs: Not tested at this time due to fatigue and weakness  Balance: Pt has fair sitting balance at edge of bed, but declines with fatigue.  Pt has fair standing balance with UE support.    Assessment:  Pt presents to TCU with significant decline in function following extended hospital stay.  Pt has increased weakness and decreased activity tolerance with neural deficits.  Pt currently needs mod-max A for bed mobility and close SBA for edge of bed sitting.  Pt is able to stand with UE support for up to 1 minute.  Pt has good muscle activation, but limited postural stability demonstrated by flexed and R retracted posture.  Pt is very motivated to work and follows directions well.    Other Barriers to Discharge (DME, Family Training, etc): Stairs, family training

## 2021-05-16 NOTE — PROGRESS NOTES
05/16/21 0815   Quick Adds   Type of Visit Initial PT Evaluation   Living Environment   People in home alone   Current Living Arrangements house   Home Accessibility stairs to enter home;stairs within home   Number of Stairs, Main Entrance 10;3   Stair Railings, Main Entrance railing on left side (ascending)   Number of Stairs, Within Home, Primary 10   Stair Railings, Within Home, Primary railing on left side (ascending)   Transportation Anticipated family or friend will provide   Living Environment Comments PT:  Pt anticuipated d/c to daughters home. Daughter will be avalible intermittent to A when not working.  Pt will carrie to be able to complete flight of stairs in home to up stairs for bedroom and bath.  1 railing presnt for inside and outside steps.   Self-Care   Usual Activity Tolerance good   Current Activity Tolerance poor   Regular Exercise Yes   Activity/Exercise Type walking   Exercise Amount/Frequency 3-5 times/wk   Equipment Currently Used at Home cane, quad;walker, rolling   Activity/Exercise/Self-Care Comment PT:  Pt notes walking 3-4x/week upto 4 miles.  Pt Ind with mobiliuty prior to decline.   Disability/Function   Hearing Difficulty or Deaf no   Wear Glasses or Blind yes   Vision Management Glasses   Concentrating, Remembering or Making Decisions Difficulty no   Difficulty Communicating no   Doing Errands Independently Difficulty (such as shopping) yes   Fall history within last six months no   Number of times patient has fallen within last six months 0   Change in Functional Status Since Onset of Current Illness/Injury yes   General Information   Onset of Illness/Injury or Date of Surgery 03/26/21   Referring Physician Fred Odonnell MD   Patient/Family Therapy Goals Statement (PT) Get moveing and be Ind again   Pertinent History of Current Problem (include personal factors and/or comorbidities that impact the POC) From H&P:  Pt w/ GBM s/p resection (Feb 2021), depression/anxiety, HTN,  asthma, and GERD, who presents as transfer for consideration of inpt rad/onc treatment while she continues to be treated for complex presentation of acute hypoxic respiratory failure (due to possible PJP pneumonia and/or TRALI), 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   Cognition   Orientation Status (Cognition) oriented x 3   Affect/Mental Status (Cognition) WNL   Pain Assessment   Patient Currently in Pain Yes, see Vital Sign flowsheet   Integumentary/Edema   Integumentary/Edema Comments PT:  Pt notes swelling in B feet, but is imporving.  Pt has no noted swelling in feet during observation except for slight indentation around lateral mallioli   Posture    Posture Comments PT:  Pt in fwd flexed position with round spine in sitting edge of bed.  Pt able to correct minimally with verbal cuing.  Pt in fwd flexed posture with R hip retraction and weight-shift to L in standing.  Pt able to correct with verbal cuing.     Range of Motion (ROM)   ROM Comment PT:  Pt has ROM WNL for knee and hips.  Mild ROM deficits in B ankles with L worse then R.   Strength   Strength Comments PT:  Pt presents with grossly 3+/5 strength in LE with R stronger then L.  Weakness noted in hips for ab/adduction and hamstrings at 3/5.   Bed Mobility   Comment (Bed Mobility) PT:  Pt able to transfer from supine>sitting with mod A at trunk and LE.  Pt able to complete sit>supine with mod-max A at BLE and trunk.   Transfers   Transfer Safety Comments PT:  Pt able to complete STS from edge of bed in elevated position with min-mod A for lift and UE support on PT shoulders.   Gait/Stairs (Locomotion)   Comment (Gait/Stairs) PT:  Unable to complete at this time due to fatigue and safety concerns.   Balance   Balance Comments PT:  Pt had fair sitting balance and able to sit EOB with SBA.  Pt did have 1 LOB in sitting due to fatigue from standing.  Pt had poor-fair standing balance with UE support.   Sensory  Examination   Sensory Perception Comments PT:  Pt notes mild tingling in dorsum of B foot   Coordination   Coordination Comments Mild decreased coordination with mild tremur noited during finger to nose test.  Mild dysmetria noted also    Muscle Tone   Muscle Tone Comments PT:  SIngle catch spacticity in R and 2-3 catch on L.  Light clonus noted B with L greater thrn R.   Clinical Impression   Criteria for Skilled Therapeutic Intervention yes, treatment indicated   PT Diagnosis (PT) Muscle weakness, Abnormalities of gait and mobility   Influenced by the following impairments Muscle weakness, abnormal tone, tremur, decreased activity tolerance decreased ROM, poor balance   Functional limitations due to impairments bed mobility, transfers, gait, stairs   Clinical Presentation Evolving/Changing   Clinical Presentation Rationale Pt has extensive medical history and multiple impairments that need manageing.   Clinical Decision Making (Complexity) moderate complexity   Therapy Frequency (PT) 6x/week   Predicted Duration of Therapy Intervention (days/wks) 30 days   Planned Therapy Interventions (PT) balance training;bed mobility training;cryotherapy;gait training;home exercise program;joint mobilization;manual therapy techniques;motor coordination training;neuromuscular re-education;patient/family education;postural re-education;ROM (range of motion);stair training;strengthening;stretching;swiss ball techniques;TENS;thermotherapy;transfer training   Risk & Benefits of therapy have been explained evaluation/treatment results reviewed;care plan/treatment goals reviewed;risks/benefits reviewed;current/potential barriers reviewed;participants voiced agreement with care plan;participants included;patient   Clinical Impression Comments Pt would benefit from skilled PT intervention in order to address aforementioned deficits in progression to safety transfer to home with home PT.   PT Discharge Planning    PT Discharge  Recommendation (DC Rec) home with home care physical therapy   PT Rationale for DC Rec Pt will need continue PT intervention in order to progress beyond basic home mobility and community mobility   Therapy Certification   Start of care date 05/16/21   Certification date from 05/16/21   Certification date to 06/15/21   Medical Diagnosis Acute hypoxic respiratory failure    Total Evaluation Time   Total Evaluation Time (Minutes) 25

## 2021-05-16 NOTE — PROGRESS NOTES
05/16/21 1116   Quick Adds   Quick Adds Certification   Type of Visit Initial Occupational Therapy Evaluation   Living Environment   People in home alone   Current Living Arrangements house   Home Accessibility stairs to enter home;stairs within home   Transportation Anticipated family or friend will provide   Living Environment Comments PT:  Pt anticipates d/c to daughter's home. Daughter will be available intermittently to A when not working.  Pt will need to be able to complete flight of stairs in home as bedroom is upstairs.  There is a toilet on the main floor and a walk in shower on the lower level, tub/shower on upper bedroom level.   1 railing present for inside and outside steps   Self-Care   Usual Activity Tolerance good   Current Activity Tolerance poor   Regular Exercise Yes   Activity/Exercise Type walking   Exercise Amount/Frequency 3-5 times/wk   Equipment Currently Used at Home cane, quad;walker, rolling   Activity/Exercise/Self-Care Comment PT:  Pt notes walking 3-4x/week up to 4 miles.  Pt Ind with mobility prior to decline.   Disability/Function   Hearing Difficulty or Deaf no   Wear Glasses or Blind yes   Vision Management Glasses   Concentrating, Remembering or Making Decisions Difficulty no   Difficulty Communicating no   Difficulty Eating/Swallowing no   Walking or Climbing Stairs Difficulty no   Dressing/Bathing Difficulty no   Toileting issues no   Doing Errands Independently Difficulty (such as shopping) no   Fall history within last six months no   Number of times patient has fallen within last six months 0   Change in Functional Status Since Onset of Current Illness/Injury yes   General Information   Onset of Illness/Injury or Date of Surgery 04/26/21   Referring Physician Fred Odonnell MD   Patient/Family Therapy Goal Statement (OT) to get better   Additional Occupational Profile Info/Pertinent History of Current Problem Olga Bailey was admitted on 4/26/2021 for Pneumocystis  jirovecii pneumonia.  Multiple problems were addressed during her hospitalization.  Pt has an extensive PMH including GBM.   Cognitive Status Examination   Orientation Status   (oriented to day of week/place, cues for additional specifics)   Affect/Mental Status (Cognitive) WNL   Follows Commands WFL   Safety Deficit moderate deficit   Cognitive Status Comments OT: Pt followed cues well, was engaged at OT eval. She endorses a change in her cognition over this most recent hospitalization.    Visual Perception   Visual Impairment/Limitations corrective lenses full-time   Pain Assessment   Patient Currently in Pain No   Posture   Posture forward head position   Posture Comments Pt falling back and to the L during unsupported sitting. She needed grab bars on bed and cues as well as intermittent hands on assist.    Range of Motion Comprehensive   Comment, General Range of Motion LUE WNLs, RUE 70 flex seated AROM but pt able to give SROM to RUE reclining to 150 degrees. Distally RUE AROM is WFLs.   Strength Comprehensive (MMT)   Comment, General Manual Muscle Testing (MMT) Assessment Grade 4/5 LUE and distal LUE, L sh is grade 2/5.   Coordination   Coordination Comments NT, appears WFLs   Bed Mobility   Comment (Bed Mobility) Mod A supine to sit, needed HOB raised and she used the side rail.   Transfers   Transfer Comments STS max A, no pivot as pt requiring max assist and this was exerting for pt.   Balance   Balance Comments Poor sitting balance.   Activities of Daily Living   BADL Assessment/Intervention other (see comments)  (See ADL comments below.)   Instrumental Activities of Daily Living (IADL)   IADL Comments Pt had been IND with IADLs PTA.   Clinical Impression   Criteria for Skilled Therapeutic Interventions Met (OT) yes;skilled treatment is necessary   OT Diagnosis Decreased ADLs/IADLs/ transfers.   OT Problem List-Impairments impacting ADL problems related to;activity tolerance  impaired;balance;cognition;mobility;range of motion (ROM);strength   ADL comments/analysis Pt able to participate in ADLs, SBA gr/hyg and dressing UB supported in bed, max A LB - pt does well with rolling. She has been using a bedpan but therapist recommends liko lift to and from the commode with staff present at all times due to decrease balance.  Th set a commode in pt's room.    Assessment of Occupational Performance 5 or more Performance Deficits   Identified Performance Deficits gr/hyg, bathing, dressing, transfers, IADLs   Planned Therapy Interventions (OT) ADL retraining;balance training;IADL retraining;bed mobility training;cognition;ROM;strengthening;transfer training;home program guidelines;progressive activity/exercise   Clinical Decision Making Complexity (OT) low complexity   Therapy Frequency (OT) 6x/week   Predicted Duration of Therapy 2.5 weeks   Anticipated Equipment Needs Upon Discharge (OT)   (consider AE for the bathroom)   Risk & Benefits of therapy have been explained evaluation/treatment results reviewed;care plan/treatment goals reviewed;risks/benefits reviewed;current/potential barriers reviewed;participants voiced agreement with care plan;participants included;patient   Comment-Clinical Impression Pt is a good candidate for skilled OT.  She was motivated at her evaluation and agrees with POC.  Pt is performing well below her baseline at present as she reports she was active and IND PTA.  Currently she is needing assist with her ADLs, bed mobility and STS.  Recommend OT 6x/wk anticipating home with dtr in about 2 and 1/2 weeks pending progress and ability to home alone for a period of time while dtr is not available.   OT Discharge Planning    OT Discharge Recommendation (DC Rec) Home with assist;home with home care occupational therapy   Therapy Certification   Start of Care Date 05/16/21   Certification date from 05/16/21   Certification date to 06/14/21   Total Evaluation Time (Minutes)    Total Evaluation Time (Minutes) 30

## 2021-05-16 NOTE — PLAN OF CARE
The patient is alert and oriented x 3. Denies pain or discomfort. Pure wick in place. Denies SOB or chest pain. Turned and repositioned. Seem to be sleeping comfortably. Continue to monitor for comfort.       Patient's most recent vital signs are:     Vital signs:  BP: 129/64  Temp: 97  HR: 102  RR: 18        Patient does not have new respiratory symptoms.  Patient does not have new sore throat.  Patient does not have a fever greater than 99.5.

## 2021-05-17 ENCOUNTER — ALLIED HEALTH/NURSE VISIT (OUTPATIENT)
Dept: RADIATION ONCOLOGY | Facility: CLINIC | Age: 76
End: 2021-05-17
Attending: STUDENT IN AN ORGANIZED HEALTH CARE EDUCATION/TRAINING PROGRAM
Payer: MEDICARE

## 2021-05-17 LAB
GAMMA INTERFERON BACKGROUND BLD IA-ACNC: NORMAL IU/ML
GLUCOSE BLDC GLUCOMTR-MCNC: 111 MG/DL (ref 70–99)
GLUCOSE BLDC GLUCOMTR-MCNC: 119 MG/DL (ref 70–99)
GLUCOSE BLDC GLUCOMTR-MCNC: 161 MG/DL (ref 70–99)
GLUCOSE BLDC GLUCOMTR-MCNC: 88 MG/DL (ref 70–99)
M TB IFN-G CD4+ BCKGRND COR BLD-ACNC: NORMAL IU/ML
M TB TUBERC IFN-G BLD QL: NORMAL
MITOGEN IGNF BCKGRD COR BLD-ACNC: NORMAL IU/ML

## 2021-05-17 PROCEDURE — 77014 PR CT GUIDE FOR PLACEMENT RADIATION THERAPY FIELDS: CPT | Mod: 26 | Performed by: STUDENT IN AN ORGANIZED HEALTH CARE EDUCATION/TRAINING PROGRAM

## 2021-05-17 PROCEDURE — 120N000009 HC R&B SNF

## 2021-05-17 PROCEDURE — 97535 SELF CARE MNGMENT TRAINING: CPT | Mod: GO

## 2021-05-17 PROCEDURE — 82962 GLUCOSE BLOOD TEST: CPT | Mod: 91

## 2021-05-17 PROCEDURE — 250N000011 HC RX IP 250 OP 636: Performed by: INTERNAL MEDICINE

## 2021-05-17 PROCEDURE — 77386 HC IMRT TREATMENT DELIVERY, COMPLEX: CPT | Performed by: STUDENT IN AN ORGANIZED HEALTH CARE EDUCATION/TRAINING PROGRAM

## 2021-05-17 PROCEDURE — 250N000012 HC RX MED GY IP 250 OP 636 PS 637: Performed by: INTERNAL MEDICINE

## 2021-05-17 PROCEDURE — 250N000013 HC RX MED GY IP 250 OP 250 PS 637: Performed by: INTERNAL MEDICINE

## 2021-05-17 PROCEDURE — 97110 THERAPEUTIC EXERCISES: CPT | Mod: GP

## 2021-05-17 PROCEDURE — 999N001017 HC STATISTIC GLUCOSE BY METER IP

## 2021-05-17 PROCEDURE — 92610 EVALUATE SWALLOWING FUNCTION: CPT | Mod: GN | Performed by: SPEECH-LANGUAGE PATHOLOGIST

## 2021-05-17 PROCEDURE — 97530 THERAPEUTIC ACTIVITIES: CPT | Mod: GP

## 2021-05-17 RX ADMIN — MESALAMINE 1000 MG: 1000 SUPPOSITORY RECTAL at 22:30

## 2021-05-17 RX ADMIN — QUETIAPINE FUMARATE 50 MG: 25 TABLET ORAL at 22:30

## 2021-05-17 RX ADMIN — SULFAMETHOXAZOLE AND TRIMETHOPRIM 1 TABLET: 400; 80 TABLET ORAL at 08:39

## 2021-05-17 RX ADMIN — BUSPIRONE HYDROCHLORIDE 30 MG: 10 TABLET ORAL at 08:39

## 2021-05-17 RX ADMIN — FLUOXETINE 60 MG: 20 CAPSULE ORAL at 08:38

## 2021-05-17 RX ADMIN — ENOXAPARIN SODIUM 40 MG: 40 INJECTION SUBCUTANEOUS at 16:24

## 2021-05-17 RX ADMIN — Medication 2.5 MG: at 08:39

## 2021-05-17 RX ADMIN — PANTOPRAZOLE SODIUM 40 MG: 40 TABLET, DELAYED RELEASE ORAL at 16:24

## 2021-05-17 RX ADMIN — QUETIAPINE FUMARATE 25 MG: 25 TABLET ORAL at 08:39

## 2021-05-17 RX ADMIN — DEXAMETHASONE 1 MG: 1 TABLET ORAL at 08:39

## 2021-05-17 RX ADMIN — DOXEPIN HYDROCHLORIDE 3 MG: 10 SOLUTION ORAL at 22:05

## 2021-05-17 RX ADMIN — FUROSEMIDE 40 MG: 40 TABLET ORAL at 08:39

## 2021-05-17 RX ADMIN — QUETIAPINE FUMARATE 25 MG: 25 TABLET ORAL at 16:24

## 2021-05-17 RX ADMIN — Medication 2.5 MG: at 20:43

## 2021-05-17 RX ADMIN — Medication 2.5 MG: at 13:30

## 2021-05-17 RX ADMIN — BUSPIRONE HYDROCHLORIDE 30 MG: 10 TABLET ORAL at 20:43

## 2021-05-17 RX ADMIN — LEVETIRACETAM 750 MG: 750 TABLET, FILM COATED ORAL at 20:44

## 2021-05-17 RX ADMIN — PANTOPRAZOLE SODIUM 40 MG: 40 TABLET, DELAYED RELEASE ORAL at 06:24

## 2021-05-17 RX ADMIN — LINACLOTIDE 290 MCG: 145 CAPSULE, GELATIN COATED ORAL at 06:24

## 2021-05-17 RX ADMIN — LEVETIRACETAM 750 MG: 750 TABLET, FILM COATED ORAL at 08:39

## 2021-05-17 NOTE — PLAN OF CARE
Discharge Planner Post-Acute Rehab OT:     Discharge Plan: discharge to [facility]'s home, pt will need to be home alone intermittently, home health and home OT    Precautions: weakness RUE, poor midline sitting unsupported, falls    Current Status:  ADLs: Pt requires set-up for gr/hyg and oral cares, min A UB dressing supported seated in bed, max A LB dressing, mod A supine to sit w/ HOB raised and use of side rail, max A STS which was tiring for pt, Max A bathing in rolling shower chair, pt able to participate in hair washing, UB sponge bathing, and cleansing brad area  IADLs: Pt had been IND with IADLs PTA.  Vision/Cognition: Pt wears glasses and notes a change in her cognition.  She is partially oriented and follows directions.     Assessment: Pt seen for full shower assessment. Planned to trial stand pivot transfer from EOB > W/c with Ax2 but pt unable to tolerate d/t fatigue/weakness. Pt sat EOB unsupported with SBA for 5 minutes which she was pleased about. Pt able to participate in bathing using BUEs, although required max A d/t fatigue level/weakness. At end of session, completed liko lift transfer to armchair to promote increased activity/sitting tolerance, which was pt's idea. Pt motivated with therapy. Recommend OT 6x/wk anticipating home with dtr in about 2 and 1/2 weeks pending progress and ability to home alone for a period of time while dtr is not available.  Con't OT per POC.    Other Barriers to Discharge (DME, Family Training, etc): Family training, consider AE for bathroom. Pt will be on the main floor at [facility]'s home for kitching/living/bathroom but her bed is upstairs.  [facility] has tub/shower upstairs, toilet on the main floor and walk in shower on the lower level.  Pt is used to a high toilet at her own house and a walk in shower with grab bars.

## 2021-05-17 NOTE — PROGRESS NOTES
Calorie Counts    5/17: 1190 kcal and 35 g protein (2 meals, 0 supplements - lunch ordered but not recorded)  5/18: 1325 kcal and 68 g protein (3 meals, 1 Magic Shake)    Pt reports she enjoys the Magic Shake and drank ~75% last night. She would like a variety of flavors to be sent at PM snack daily. Discussed calorie counts r/t nutritional needs and that she may eat more (she is worried she may be eating too much). Encouraged her to drink the Magic Shake for addtl nutritional value and as she eats better and her diet advances, we may discontinue the shake.       Alicia Francis RD, BENITO  TCU/8A Pager (M-F): [phone]  Weekend Pager ([place]): [phone]

## 2021-05-17 NOTE — PLAN OF CARE
Discharge Planner Post-Acute Rehab PT:     Discharge Plan: Discharge to daughters home with home PT at mod I with fww    Precautions: Fall precaution    Current Status:  Bed Mobility: Needs min A for getting to/from edge of bed  Transfer: Liko or Ax2 for SPT with fww  Gait: Not tested at this time due to fatigue and weakness  Stairs: Not tested at this time due to fatigue and weakness  Balance: Pt has fair sitting balance at edge of bed, but declines with fatigue.  Pt has fair standing balance with UE support.    Assessment:  Andrés for bed mob this am, improved from yesterday (mod/maxA). Pt reports she likes morning therapy best, tends to be an early riser. Found w/c for pt to use while on unit, placed in room.    Other Barriers to Discharge (DME, Family Training, etc): Stairs, family training

## 2021-05-17 NOTE — PLAN OF CARE
Pt is alert and oriented x 4. Denies chest pain, SOB, headache, nausea or abdominal discomfort. Denies pain but took scheduled oxycodone pain meds. A of 2 with liko lift. DD1 diet with nectar thicken liquid. On calorie count for 3 days. LBM 05/17/2021. Purewick on. Abdominal bruised. No insulin given as order parameters not met. Call light within reach.Continue with POC    Patient's most recent vital signs are:     Vital signs:  BP: 117/72  Temp: 98  HR: 93  RR: 16  SpO2: 94 %     Patient does not have new respiratory symptoms.  Patient does not have new sore throat.  Patient does not have a fever greater than 99.5.

## 2021-05-17 NOTE — PROGRESS NOTES
"Social Work: Initial Assessment with Discharge Plan    Patient Name: Olga Bailey  : 1945  Age: 75 year old  MRN: [id number]  Completed assessment with: Pt interview and chart review  Admitted to TCU: 5/15/2021    Presenting Information   Date of  assessment: May 17, 2021  Health Care Directive: Copy in Chart  Primary Health Care Agent: Self  Secondary Health Care Agent: Shared responsibility among her three adult children  Living Situation: Pt was living alone in a home with one main level and JOHNSON prior to hospitalization; new plan is to discharge to pt's daughter's home.  Per  note of 3/2/21: \"La Nena's house has 3 JOHNSON from garage and 2 JOHNSON from the front door. Stairs inside to second level where bedroom is but La Nena can set up bedroom in office on main level. About 10 STI. Tub and walk-in on second level and walk-in in the basement as well.\"  Previous Functional Status: Independent with ADLs and IADLs; pt had been driving and walking well with her cane following knee surgery in late .  DME available: Quad cane and rolling walker  Patient and family understanding of hospitalization: Yes  Cultural/Language/Spiritual Considerations: White, English-speaking woman; [employer]ology.  Abuse concerns: No  BIMS: Pt scored 09 on BIMS indicating moderately impaired cognition  PHQ-9: Pt scored 14 on PHQ-9 indicating moderate depressive symptoms  PAS: confirmation number- [id number]  Has there been a level II screen?  No  Were there any recommendations in the screen? N/A  If yes, will the recommendations we incorporated into the Plan of Care?  N/A  Physical Health  Reason for admission: No diagnosis found.    Provider Information   Primary Care Physician: DILCIA Johnson at [facility] [phone]  : None    Mental Health:   Diagnosis: h/o depression and anxiety, ongoing. Pt relates current symptoms to her uncertain prognosis with her glio and loss of mobility.  Current " "Support/Services: Currently being treated by an annual visit to a psychiatrist and weekly visit to therapist, which pt says is very helpful. Pt believes her medication regimen may have been adjusted in the hospital, and that made a positive difference as well.  Previous Services: Not discussed  Services Needed/Recommended: Health psychology and spiritual health services available in TCU by consult and referral if needed. Declined both at this time; pt recalls meeting with Dr. Florence during a previous ARU admission, but feels at this point in time needs are met by her outside therapist.     Substance Use:  Diagnosis: Very rare alcohol use, no tobacco or illicit drug use reported.   Current Support/Services: None reported  Previous Services: Not discussed  Services Needed/Recommended: N/A    Support System  Marital Status: /single  Family support: Pt has a daughter La Nena who is RN at [employer]. She has a son living in [place] and a son in [place]. Grandchildren, 4 (18, 16, 2 little ones).   Other support available: Pt has \"dozens of friends\" in her senior living community  Gaps in support system: None identified at this time.  Does patient require assistance from staff to communicate with family and/or for virtual medical appointments? Not for family; may need assistance with video visits.  Prior to your admission, was there anyone you relied on for assistance with ADLs? No    Community Resources  Current in home services: No  Previous services: No    Financial/Employment/Education  Employment Status: Retired renal RN  Income Source: half-way funds and social security  Education: Not discussed  Financial Concerns:  Pt expressed emotional concerns over uncertainty about how significant her medical costs will be. Pt stated that she does have a  who may be able to offer assistance.  Insurance: Medicare and BCBS      Discharge Plan   Patient and family discharge goal: To " daughter's home (see details above) with any recommended services and equipment  Provided Education on discharge plan: YES  Patient agreeable to discharge plan:  YES  A list of Medicare Certified Facilities was provided to the patient and/or family to encourage patient choice. Based on location and rating, patient would like referrals made to: Not at this time  General information regarding anticipated insurance coverage and possible out of pocket cost was discussed. Patient and patient's family are aware patient may incur the cost of transportation to the facility, pending insurance payment: YES  Barriers to discharge: Medical stability, therapy goals met    Discharge Recommendations   Disposition: TBD  Transportation Needs: Family will provide  Name of Transportation Company and Phone: TBD    Additional comments   Pt sitting in bedside chair, alert and agreeable to our visit. Pt was very forthcoming. Pt shared that she had always taken care of her health and finances and so she expected to live for a very long time. Her cancer diagnosis has been hard for her to reckon with, but she has very good family and social support. Pt suffering from some word-finding difficulty, but followed along with our conversation very well. Pt stated that she had just resumed reading magazines, which she felt showed improvement. Pt feels frustration with her current diet.    Pt may benefit from palliative consult.    SW notified pt of anticipated discharge plan of 6/16. Pt in agreement. Pt gave SW verbal consent to contact her daughter, La Nena.    SW provided reflective listening and emotional support. SW will continue to remain available for patient and family support, discharge planning, other resources and support PRN.    Frederic Weinstein MSW, [facility]  TCU   Phone: [phone]

## 2021-05-17 NOTE — PLAN OF CARE
The patient is alert and oriented. Slept most of the shift, woken up for sips of water. Denies pain at this time. Pur wick in place.Pt had not void the whole noc, bladder scanned this am for 373, writer told patient that has to straight cath, patient stated that she can void and she did in her brief, urine not measured.Encouraged fluids. Turned and repositioned about every 2 hours.Bed alarm on. Continue to monitor for comfort.             Patient's most recent vital signs are:     Vital signs:  BP: 118/68  Temp: 97.7  HR: 98  RR: 17  SpO2: 97 %     Patient does not have new respiratory symptoms.  Patient does not have new sore throat.  Patient does not have a fever greater than 99.5.

## 2021-05-17 NOTE — PHARMACY-TCU REVIEW OF H&P
I have reviewed this patient's TCU admission History & Physical for medication related changes/recommendations identified by the admitting provider.  I am confirming that there are no recommendations requiring changes to medication orders are indicated at this time based on the provider recommendations in the H&P.    Jyotsna Hughes, TyroneD, BCPS

## 2021-05-17 NOTE — PROGRESS NOTES
05/17/21 1200   General Information   Onset of Illness/Injury or Date of Surgery 03/26/21   Referring Physician Dr. Odonnell   Pertinent History of Current Problem Olga Bailey is a 75 y.o. 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 Stroke code called on 5/11, suspect seizure and being in post-ictal state.. pt referred for swallowing evaluation, downgraded to NDD1/on nectar (per pt choice did not want NDD2). also had been seen prior ARU admission, OP and for communication evaluation/swallowing during hospitalization. Language and cognitive deficits ongoing PT had VFSS 4/28/2021 with aspiration on thin fluids    Type of Evaluation   Type of Evaluation Swallow Evaluation   Oral Motor   Oral Musculature generally intact   Dentition (Oral Motor)   Dentition (Oral Motor) natural dentition;adequate dentition   Facial Symmetry (Oral Motor)   Facial Symmetry (Oral Motor) WNL   Lip Function (Oral Motor)   Lip Range of Motion (Oral Motor)   (wFL)   Tongue Function (Oral Motor)   Tongue ROM (Oral Motor)   (WFL)   Cough/Swallow/Gag Reflex (Oral Motor)   Volitional Throat Clear/Cough (Oral Motor) WNL   Vocal Quality/Secretion Management (Oral Motor)   Vocal Quality (Oral Motor) WFL   General Swallowing Observations   Current Diet/Method of Nutritional Intake (General Swallowing Observations, NIS) nectar-thick liquids;dysphagia level 1 (pureed)   Swallowing Evaluation Clinical swallow evaluation   Clinical Swallow Evaluation   Feeding Assistance set up only required   Clinical Swallow Evaluation Textures Trialed Nectar-Thick Liquids;Puree Textures   Clinical Swallow Evaluation: Nectar-Thick Liquid Texture  "Trial   Mode of Presentation, Nectar cup;straw;self-fed   Volume of Nectar Presented 4-6 oz   Oral Phase, [place] WFL   Pharyngeal Phase, Nectar intact  (noted couple time slight throat clear )   Diagnostic Statement SLP: appears safewith nectar, but deep peentration noted on VFSS x1 so will monitor took one pill with nectar WFL. ok with straw, as previously allowed   Clinical Swallow Evaluation: Puree Solid Texture Trial   Mode of Presentation, Puree spoon;self-fed   Volume of Puree Presented 8 oz   Oral Phase, Puree WFL   Pharyngeal Phase, Puree intact   Diagnostic Statement sLP: appears safe with pureed, pt declined to trial NDD2 stating \"it does not go well, I can't swallow anymore, or chewing is hard\"   Swallowing Recommendations   Diet Consistency Recommendations nectar-thick liquids;dysphagia level 1 (pureed)   Supervision Level for Intake patient independent   Mode of Delivery Recommendations bolus size, small   Swallowing Maneuver Recommendations effortful (hard) swallow   Recommended Feeding/Eating Techniques (Swallow Eval) patient is independent, no specific recommendations;maintain upright sitting position for eating   Medication Administration Recommendations, Swallowing (SLP)   (with pureed or nectar)   Instrumental Assessment Recommendations VFSS (videofluroscopic swallowing study)  (\"2 weeks\" per discharge summary)   General Therapy Interventions   Planned Therapy Interventions Dysphagia Treatment   Dysphagia treatment Oropharyngeal exercise training;Modified diet education;Instruction of safe swallow strategies;Compensatory strategies for swallowing   SLP Therapy Assessment/Plan   Criteria for Skilled Therapeutic Interventions Met (SLP Eval) yes;treatment indicated   SLP Diagnosis SLP:moderate oropharyngeal dysphagia    Rehab Potential (SLP Eval) good, to achieve stated therapy goals   Therapy Frequency (SLP Eval) 5 times/wk   Predicted Duration of Therapy Intervention (SLP Eval) estimated 2 weeks "   Comment, Therapy Assessment/Plan (SLP) SLP: SLP: pt seen for swallowing evaluation. Admitted on NDD2 diet, nectar (per recent VFSS ), pt had asked to be downgraded to  NDD1 day prior.  Pt seen at meal, appears safe to continue NDD1 diet, nectar fluids, occasional throat clear after pureed swallows, no outward sx aspiration, pt did not want to trial NDD2 items.  REcommend continue NDD1,diet, nectar fluids, small bites and sips, ok straw.  pt to sit up for po,extra swallows PRN.  Will need repeat VFSS prior to advancing diet (at least for liquids). Monitor for sx aspiration.  pt is below baseline and would benefit from skilled intervention to improve swallowing on least restrictive diet.   Therapy Plan Review/Discharge Plan (SLP)   Therapy Plan Review (SLP) evaluation/treatment results reviewed;care plan/treatment goals reviewed;risks/benefits reviewed;current/potential barriers reviewed;participants voiced agreement with care plan;participants included;patient   Therapy Certification   Start of Care Date 05/17/21   Certification date from 05/17/21   Certification date to 06/16/21   Medical Diagnosis moderate oropharyngeal dysphagia     Total Evaluation Time   Total Evaluation Time (Minutes) 45

## 2021-05-17 NOTE — PLAN OF CARE
Occupational Therapy Discharge Summary    Reason for therapy discharge:    Discharged to transitional care facility.    Progress towards therapy goal(s). See goals on Care Plan in [facility] electronic health record for goal details.  Goals partially met.  Barriers to achieving goals:   discharge from facility.    Therapy recommendation(s):    Continued therapy is recommended.  Rationale/Recommendations:  to increase ADL/IADL independence and safety prior to return home.

## 2021-05-17 NOTE — PROGRESS NOTES
SPIRITUAL HEALTH SERVICES  SPIRITUAL ASSESSMENT Progress Note  [facility] ([facility]) TCU R426 5/17     REFERRAL SOURCE: Initial Visit    Pt was very upset after being informed of  A new diagnosis. She fears her life is threatened while her family is not 100 % on the same page with her. Pt mentioned she has no where to turn since she is angry with God.  assisted pt in identifying that things happen sometimes that are not in our control and Gods' love and mercy is still available. Pt was provided the opportunity to redirect focus from   The  Negative to understand the time she has and to identify how to make positive memories within the present.    PLAN: Will  Continue to follow up with pt  As needed while on unit.    Giovanna Drew  Associate    Pager: [phone]

## 2021-05-17 NOTE — PHARMACY-MEDICATION REGIMEN REVIEW
Pharmacy Medication Regimen Review  Olga Bailey is a 75 year old female who is currently in the Transitional Care Unit.    Assessment: Upon review of the medications and patient chart the following irregularities were found:     Medications without appropriate indications/durations: dexamethasone taper to stop 5/19/21, no stop date entered    Plan:   1. Add stop date of 5/19 to dexamethasone order.    Attending provider will be sent this note for review.  If there are any emergent issues noted above, pharmacist will contact provider directly by phone.      Pharmacy will periodically review the resident's medication regimen for any PRN medications not administered in > 72 hours and discontinue them. The pharmacist will discuss gradual dose reductions of psychopharmacologic medications with interdisciplinary team on a regular basis.    Please contact pharmacy if the above does not answer specific medication questions/concerns.    Background:  A pharmacist has reviewed all medications and pertinent medical history today.  Medications were reviewed for appropriate use and any irregularities found are listed with recommendations.    Jyotsna Hughes, TyroneD, BCPS    Current Facility-Administered Medications:      [START ON 5/18/2021] - Skin Test Reading -, , Does not apply, Q21 Days, Fred Odonnell MD     acetaminophen (TYLENOL) tablet 650 mg, 650 mg, Oral, Q4H PRN, Fred Odonnell MD     albuterol (PROAIR HFA/PROVENTIL HFA/VENTOLIN HFA) 108 (90 Base) MCG/ACT inhaler 2 puff, 2 puff, Inhalation, Q4H PRN, Fred Odonnell MD     albuterol (PROVENTIL) neb solution 2.5 mg, 2.5 mg, Nebulization, Q4H PRN, Fred Odonnell MD     amLODIPine (NORVASC) tablet 5 mg, 5 mg, Oral, Daily, Fred Odonnell MD     bisacodyl (DULCOLAX) Suppository 10 mg, 10 mg, Rectal, Daily PRN, Fred Odonnell MD     busPIRone (BUSPAR) tablet 30 mg, 30 mg, Oral, BID, Fred Odonnell MD, 30 mg at 05/17/21 0839     calcium carbonate (TUMS) chewable  tablet 1,000 mg, 1,000 mg, Oral, 4x Daily PRN, Fred Odonnell MD     carboxymethylcellulose PF (REFRESH PLUS) 0.5 % ophthalmic solution 1 drop, 1 drop, Both Eyes, Q1H PRN, Fred Odonnell MD     dexamethasone (DECADRON) tablet 1 mg, 1 mg, Oral, Daily, Fred Odonnell MD, 1 mg at 05/17/21 0839     glucose gel 15-30 g, 15-30 g, Oral, Q15 Min PRN **OR** dextrose 50 % injection 25-50 mL, 25-50 mL, Intravenous, Q15 Min PRN **OR** glucagon injection 1 mg, 1 mg, Subcutaneous, Q15 Min PRN, Fred Odonnell MD     doxepin (SINEquan) 10 MG/ML (HIGH CONC) solution 3 mg, 3 mg, Oral, At Bedtime, Fred Odonnell MD, 3 mg at 05/16/21 2225     enoxaparin ANTICOAGULANT (LOVENOX) injection 40 mg, 40 mg, Subcutaneous, Q24H, Fred Odonnell MD, 40 mg at 05/16/21 1625     eucerin cream, , Topical, Q1H PRN, Fred Odonnell MD     FLUoxetine (PROzac) capsule 60 mg, 60 mg, Oral, Daily, Fred Odonnell MD, 60 mg at 05/17/21 0838     furosemide (LASIX) tablet 40 mg, 40 mg, Oral, Daily, Fred Odonnell MD, 40 mg at 05/17/21 0839     insulin aspart (NovoLOG) injection (RAPID ACTING), 1-3 Units, Subcutaneous, TID AC, Fred Odonnell MD, 1 Units at 05/16/21 1246     insulin aspart (NovoLOG) injection (RAPID ACTING), 1-3 Units, Subcutaneous, At Bedtime, Fred Odonnell MD     levETIRAcetam (KEPPRA) tablet 750 mg, 750 mg, Oral, BID, Fred Odonnell MD, 750 mg at 05/17/21 0839     linaclotide (LINZESS) capsule 290 mcg, 290 mcg, Oral, QAM AC, Fred Odonnell MD, 290 mcg at 05/17/21 0624     LORazepam (ATIVAN) tablet 0.5 mg, 0.5 mg, Oral, Q4H PRN, Fred Odonnell MD     mesalamine (CANASA) Suppository 1,000 mg, 1,000 mg, Rectal, At Bedtime, Fred Odonnell MD, 1,000 mg at 05/16/21 5542     naloxone (NARCAN) injection 0.2 mg, 0.2 mg, Intravenous, Q2 Min PRN **OR** naloxone (NARCAN) injection 0.4 mg, 0.4 mg, Intravenous, Q2 Min PRN **OR** naloxone (NARCAN) injection 0.2 mg, 0.2 mg, Intramuscular, Q2 Min PRN **OR** naloxone (NARCAN)  injection 0.4 mg, 0.4 mg, Intramuscular, Q2 Min PRN, Fred Odonnell MD     Nurse may request from Pharmacy a change of form of medication (e.g. Liquid to tablet)., , Does not apply, Continuous PRN, Fred Odonnell MD     ondansetron (ZOFRAN-ODT) ODT tab 4 mg, 4 mg, Oral, Q6H PRN **OR** ondansetron (ZOFRAN) injection 4 mg, 4 mg, Intravenous, Q6H PRN, Fred Odonnell MD     oxyCODONE IR (ROXICODONE) half-tab 2.5 mg, 2.5 mg, Oral, TID, Fred Odonnell MD, 2.5 mg at 05/17/21 0839     oxyCODONE IR (ROXICODONE) half-tab 2.5-5 mg, 2.5-5 mg, Oral, Q4H PRN, Fred Odonnell MD     pantoprazole (PROTONIX) EC tablet 40 mg, 40 mg, Oral, BID AC, Fred Odonnell MD, 40 mg at 05/17/21 0624     polyethylene glycol (MIRALAX) powder 17 g, 17 g, Oral, Daily PRN, Fred Odonnell MD     QUEtiapine (SEROquel) tablet 25 mg, 25 mg, Oral, BID, Fred Odonnell MD, 25 mg at 05/17/21 0839     QUEtiapine (SEROquel) tablet 50 mg, 50 mg, Oral, At Bedtime, Fred Odonnell MD, 50 mg at 05/16/21 2225     sennosides (SENOKOT) tablet 1 tablet, 1 tablet, Oral, BID PRN, Fred Odonnell MD     simethicone (MYLICON) suspension 40 mg, 40 mg, Oral, Q6H PRN, Fred Odonnell MD     sulfamethoxazole-trimethoprim (BACTRIM) 400-80 MG per tablet 1 tablet, 1 tablet, Oral, Daily, Fred Odonnell MD, 1 tablet at 05/17/21 0839

## 2021-05-17 NOTE — PLAN OF CARE
Discharge Planner Post-Acute Rehab SLP:     Discharge Plan: home with assist , further SLP pending progress    Precautions:fall, swallowing    Current Status:  Communication: to be assessed, prior reported language deficits at higher level  Cognition: to be assessed, prior cognitive impairments, had been receiving OP SLP  Swallow: NDD1 diet, nectar fluids. VFSS 4/28/2021    Assessment: SLP: SLP: pt seen for swallowing evaluation. Admitted on NDD2 diet, nectar (per recent VFSS ), pt had asked to be downgraded to  NDD1 day prior.  Pt seen at meal, appears safe to continue NDD1 diet, nectar fluids, occasional throat clear after pureed swallows, no outward sx aspiration, pt did not want to trial NDD2 items also did not trial thin 2/2 aspiration risk.  Recommend continue NDD1,diet, nectar fluids, small bites and sips, ok straw.  pt to sit up for po,extra swallows PRN.  Will need repeat VFSS prior to advancing diet (at least for liquids). Monitor for sx aspiration.  pt is below baseline and would benefit from skilled intervention to improve swallowing on least restrictive diet.  Will also evaluate communication skills, as pt has prior deficits, and had been receiving SLP tx. Discussed with JASPREET peña to proceed.    Other Barriers to Discharge (Family Training, etc): *TBD

## 2021-05-18 ENCOUNTER — APPOINTMENT (OUTPATIENT)
Dept: OCCUPATIONAL THERAPY | Facility: SKILLED NURSING FACILITY | Age: 76
End: 2021-05-18
Payer: COMMERCIAL

## 2021-05-18 ENCOUNTER — APPOINTMENT (OUTPATIENT)
Dept: PHYSICAL THERAPY | Facility: SKILLED NURSING FACILITY | Age: 76
End: 2021-05-18
Payer: COMMERCIAL

## 2021-05-18 ENCOUNTER — APPOINTMENT (OUTPATIENT)
Dept: SPEECH THERAPY | Facility: SKILLED NURSING FACILITY | Age: 76
End: 2021-05-18
Payer: COMMERCIAL

## 2021-05-18 LAB
ANION GAP SERPL CALCULATED.3IONS-SCNC: 8 MMOL/L (ref 3–14)
BUN SERPL-MCNC: 27 MG/DL (ref 7–30)
CALCIUM SERPL-MCNC: 8.7 MG/DL (ref 8.5–10.1)
CHLORIDE SERPL-SCNC: 109 MMOL/L (ref 94–109)
CO2 SERPL-SCNC: 26 MMOL/L (ref 20–32)
CREAT SERPL-MCNC: 0.54 MG/DL (ref 0.52–1.04)
GFR SERPL CREATININE-BSD FRML MDRD: >90 ML/MIN/{1.73_M2}
GLUCOSE BLDC GLUCOMTR-MCNC: 94 MG/DL (ref 70–99)
GLUCOSE SERPL-MCNC: 81 MG/DL (ref 70–99)
PLATELET # BLD AUTO: 237 10E9/L (ref 150–450)
POTASSIUM SERPL-SCNC: 3.5 MMOL/L (ref 3.4–5.3)
SODIUM SERPL-SCNC: 143 MMOL/L (ref 133–144)

## 2021-05-18 PROCEDURE — 85049 AUTOMATED PLATELET COUNT: CPT | Performed by: INTERNAL MEDICINE

## 2021-05-18 PROCEDURE — 250N000011 HC RX IP 250 OP 636: Performed by: INTERNAL MEDICINE

## 2021-05-18 PROCEDURE — 250N000013 HC RX MED GY IP 250 OP 250 PS 637: Performed by: INTERNAL MEDICINE

## 2021-05-18 PROCEDURE — 80051 ELECTROLYTE PANEL: CPT | Performed by: INTERNAL MEDICINE

## 2021-05-18 PROCEDURE — 82947 ASSAY GLUCOSE BLOOD QUANT: CPT | Performed by: INTERNAL MEDICINE

## 2021-05-18 PROCEDURE — 82962 GLUCOSE BLOOD TEST: CPT

## 2021-05-18 PROCEDURE — 92523 SPEECH SOUND LANG COMPREHEN: CPT | Mod: GN

## 2021-05-18 PROCEDURE — 82310 ASSAY OF CALCIUM: CPT | Performed by: INTERNAL MEDICINE

## 2021-05-18 PROCEDURE — 99308 SBSQ NF CARE LOW MDM 20: CPT | Performed by: INTERNAL MEDICINE

## 2021-05-18 PROCEDURE — 97535 SELF CARE MNGMENT TRAINING: CPT | Mod: GO

## 2021-05-18 PROCEDURE — 82565 ASSAY OF CREATININE: CPT | Performed by: INTERNAL MEDICINE

## 2021-05-18 PROCEDURE — 250N000012 HC RX MED GY IP 250 OP 636 PS 637: Performed by: INTERNAL MEDICINE

## 2021-05-18 PROCEDURE — 120N000009 HC R&B SNF

## 2021-05-18 PROCEDURE — 84520 ASSAY OF UREA NITROGEN: CPT | Performed by: INTERNAL MEDICINE

## 2021-05-18 PROCEDURE — 97110 THERAPEUTIC EXERCISES: CPT | Mod: GP

## 2021-05-18 PROCEDURE — 97530 THERAPEUTIC ACTIVITIES: CPT | Mod: GP

## 2021-05-18 PROCEDURE — 36415 COLL VENOUS BLD VENIPUNCTURE: CPT | Performed by: INTERNAL MEDICINE

## 2021-05-18 RX ADMIN — LINACLOTIDE 290 MCG: 145 CAPSULE, GELATIN COATED ORAL at 06:47

## 2021-05-18 RX ADMIN — PANTOPRAZOLE SODIUM 40 MG: 40 TABLET, DELAYED RELEASE ORAL at 16:34

## 2021-05-18 RX ADMIN — SULFAMETHOXAZOLE AND TRIMETHOPRIM 1 TABLET: 400; 80 TABLET ORAL at 08:08

## 2021-05-18 RX ADMIN — PANTOPRAZOLE SODIUM 40 MG: 40 TABLET, DELAYED RELEASE ORAL at 06:47

## 2021-05-18 RX ADMIN — ENOXAPARIN SODIUM 40 MG: 40 INJECTION SUBCUTANEOUS at 16:34

## 2021-05-18 RX ADMIN — Medication 2.5 MG: at 08:06

## 2021-05-18 RX ADMIN — QUETIAPINE FUMARATE 25 MG: 25 TABLET ORAL at 16:34

## 2021-05-18 RX ADMIN — Medication 2.5 MG: at 13:45

## 2021-05-18 RX ADMIN — LEVETIRACETAM 750 MG: 750 TABLET, FILM COATED ORAL at 20:22

## 2021-05-18 RX ADMIN — FLUOXETINE 60 MG: 20 CAPSULE ORAL at 08:08

## 2021-05-18 RX ADMIN — BUSPIRONE HYDROCHLORIDE 30 MG: 10 TABLET ORAL at 20:22

## 2021-05-18 RX ADMIN — QUETIAPINE FUMARATE 25 MG: 25 TABLET ORAL at 08:07

## 2021-05-18 RX ADMIN — FUROSEMIDE 40 MG: 40 TABLET ORAL at 08:07

## 2021-05-18 RX ADMIN — BUSPIRONE HYDROCHLORIDE 30 MG: 10 TABLET ORAL at 08:07

## 2021-05-18 RX ADMIN — DOXEPIN HYDROCHLORIDE 3 MG: 10 SOLUTION ORAL at 22:18

## 2021-05-18 RX ADMIN — LEVETIRACETAM 750 MG: 750 TABLET, FILM COATED ORAL at 08:09

## 2021-05-18 RX ADMIN — DEXAMETHASONE 1 MG: 1 TABLET ORAL at 08:08

## 2021-05-18 RX ADMIN — QUETIAPINE FUMARATE 50 MG: 25 TABLET ORAL at 22:15

## 2021-05-18 RX ADMIN — Medication 2.5 MG: at 20:22

## 2021-05-18 NOTE — PROGRESS NOTES
05/16/21 0815   Quick Adds   Type of Visit Initial PT Evaluation   Living Environment   People in home alone   Current Living Arrangements house   Home Accessibility stairs to enter home;stairs within home   Number of Stairs, Main Entrance 10;3   Stair Railings, Main Entrance railing on left side (ascending)   Number of Stairs, Within Home, Primary 10   Stair Railings, Within Home, Primary railing on left side (ascending)   Transportation Anticipated family or friend will provide   Living Environment Comments PT:  Pt anticuipated d/c to daughters home. Daughter will be avalible intermittent to A when not working.  Pt will carrie to be able to complete flight of stairs in home to up stairs for bedroom and bath.  1 railing presnt for inside and outside steps.   Self-Care   Usual Activity Tolerance good   Current Activity Tolerance poor   Regular Exercise Yes   Activity/Exercise Type walking   Exercise Amount/Frequency 3-5 times/wk   Equipment Currently Used at Home cane, quad;walker, rolling   Activity/Exercise/Self-Care Comment PT:  Pt notes walking 3-4x/week upto 4 miles.  Pt Ind with mobiliuty prior to decline.   Disability/Function   Hearing Difficulty or Deaf no   Wear Glasses or Blind yes   Vision Management Glasses   Concentrating, Remembering or Making Decisions Difficulty no   Difficulty Communicating no   Doing Errands Independently Difficulty (such as shopping) yes   Fall history within last six months no   Number of times patient has fallen within last six months 0   Change in Functional Status Since Onset of Current Illness/Injury yes   General Information   Onset of Illness/Injury or Date of Surgery 03/26/21   Referring Physician Fred Odonnell MD   Patient/Family Therapy Goals Statement (PT) Get moveing and be Ind again   Pertinent History of Current Problem (include personal factors and/or comorbidities that impact the POC) From H&P:  Pt w/ GBM s/p resection (Feb 2021), depression/anxiety, HTN,  asthma, and GERD, who presents as transfer for consideration of inpt rad/onc treatment while she continues to be treated for complex presentation of acute hypoxic respiratory failure (due to possible PJP pneumonia and/or TRALI), 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   Cognition   Orientation Status (Cognition) oriented x 3   Affect/Mental Status (Cognition) WNL   Pain Assessment   Patient Currently in Pain Yes, see Vital Sign flowsheet   Integumentary/Edema   Integumentary/Edema Comments PT:  Pt notes swelling in B feet, but is imporving.  Pt has no noted swelling in feet during observation except for slight indentation around lateral mallioli   Posture    Posture Comments PT:  Pt in fwd flexed position with round spine in sitting edge of bed.  Pt able to correct minimally with verbal cuing.  Pt in fwd flexed posture with R hip retraction and weight-shift to L in standing.  Pt able to correct with verbal cuing.     Range of Motion (ROM)   ROM Comment PT:  Pt has ROM WNL for knee and hips.  Mild ROM deficits in B ankles with L worse then R.   Strength   Strength Comments PT:  Pt presents with grossly 3+/5 strength in LE with R stronger then L.  Weakness noted in hips for ab/adduction and hamstrings at 3/5.   Bed Mobility   Comment (Bed Mobility) PT:  Pt able to transfer from supine>sitting with mod A at trunk and LE.  Pt able to complete sit>supine with mod-max A at BLE and trunk.   Transfers   Transfer Safety Comments PT:  Pt able to complete STS from edge of bed in elevated position with min-mod A for lift and UE support on PT shoulders.   Gait/Stairs (Locomotion)   Comment (Gait/Stairs) PT:  Unable to complete at this time due to fatigue and safety concerns.   Balance   Balance Comments PT:  Pt had fair sitting balance and able to sit EOB with SBA.  Pt did have 1 LOB in sitting due to fatigue from standing.  Pt had poor-fair standing balance with UE support.   Sensory  Examination   Sensory Perception Comments PT:  Pt notes mild tingling in dorsum of B foot   Coordination   Coordination Comments Mild decreased coordination with mild tremur noited during finger to nose test.  Mild dysmetria noted also    Muscle Tone   Muscle Tone Comments PT:  SIngle catch spacticity in R and 2-3 catch on L.  Light clonus noted B with L greater thrn R.   Clinical Impression   Criteria for Skilled Therapeutic Intervention yes, treatment indicated   PT Diagnosis (PT) Muscle weakness, Abnormalities of gait and mobility   Influenced by the following impairments Muscle weakness, abnormal tone, tremur, decreased activity tolerance decreased ROM, poor balance   Functional limitations due to impairments bed mobility, transfers, gait, stairs   Clinical Presentation Evolving/Changing   Clinical Presentation Rationale Pt has extensive medical history and multiple impairments that need manageing.   Clinical Decision Making (Complexity) moderate complexity   Therapy Frequency (PT) 6x/week   Predicted Duration of Therapy Intervention (days/wks) 30 days   Planned Therapy Interventions (PT) balance training;bed mobility training;cryotherapy;gait training;home exercise program;joint mobilization;manual therapy techniques;motor coordination training;neuromuscular re-education;patient/family education;postural re-education;ROM (range of motion);stair training;strengthening;stretching;swiss ball techniques;TENS;thermotherapy;transfer training   Risk & Benefits of therapy have been explained evaluation/treatment results reviewed;care plan/treatment goals reviewed;risks/benefits reviewed;current/potential barriers reviewed;participants voiced agreement with care plan;participants included;patient   Clinical Impression Comments Pt would benefit from skilled PT intervention in order to address aforementioned deficits in progression to safety transfer to home with home PT.   PT Discharge Planning    PT Discharge  Recommendation (DC Rec) home with home care physical therapy   PT Rationale for DC Rec Pt will need continue PT intervention in order to progress beyond basic home mobility and community mobility   Therapy Certification   Start of care date 05/16/21   Certification date from 05/16/21   Certification date to 06/15/21   Medical Diagnosis Acute hypoxic respiratory failure    Total Evaluation Time   Total Evaluation Time (Minutes) 25   Eval completed by Jaime Yepez PT on 5/16/21

## 2021-05-18 NOTE — PROGRESS NOTES
SW called pt's daughter La Nena to offer my contact information and for contextual information.    La Nena shared that having the pt discharge to her home is an option, not the primary plan. She is willing to contribute to taking care of her mother in her home, but is hopeful that pt could get to where she is performing her own pericares and able to handle flights of stairs since both La Nena and her  work full-time. La Nena requested updates as patient progresses; CAMRON stated that therapists are able to do family calls. La Nena expressed pleasant surprise at this, but said she would be okay with nurse-to-nurse update calls. DON notified.    SW notified La Nena of anticipated discharge date of 6/16 and updated therapy teams on discharge placement plan during therapy rounds.    [facility] will continue to remain available for patient and family support, discharge planning, other resources and support PRN.    Frederic SORTO, [facility]   Phone: [phone]

## 2021-05-18 NOTE — PLAN OF CARE
VS: /74 (BP Location: Left arm)   Pulse 85   Temp 98.8  F (37.1  C) (Oral)   Resp 18   Wt 70.3 kg (155 lb)   SpO2 95%   BMI 27.46 kg/m     O2: RA    Output: purewick (new one placed around 1315. )   Last BM: 5/17 per pt    Activity: Liko lift for transfers    Skin: Intact    Pain: Well managed with scheduled oxycodone    CMS: Intact    Dressing: None    Diet: DD1, nectar thick   BG checks 2x daily  94 before breakfast    LDA: None    Equipment: Lift    Plan: Pt had mentioned to recreational therapy that she might need to go up in her anti-depressant medication, put sticky note for provider. Continue plan of care.    Additional Info: Pt is A&O, very pleasant. Can be forgetful. No acute concerns or complaints this shift. Radiation cancelled appt today due to equipment failure. Call light within reach.           Patient's most recent vital signs are:     Vital signs:  BP: 110/74  Temp: 98.8  HR: 85  RR: 18  SpO2: 95 %     Patient does not have new respiratory symptoms.  Patient does not have new sore throat.  Patient does not have a fever greater than 99.5.

## 2021-05-18 NOTE — PROGRESS NOTES
Patient was seen, course reviewed with staff.    Patient feels tired but otherwise well.  Therapies have been limited by fatigue and generalized weakness.  Appetite is fair.  She remains on a dysphagia 1 diet  She denies cough, chest pain, shortness of breath.  She is having occasional loose stools.    Afebrile  Vital signs stable  Blood glucoses 80s to low 100s    Alert, fully oriented, very pleasant  Lungs clear  CV regular rhythm  Abdomen soft, nondistended, nontender  No ankle edema.      Assessment/plan    Olga Bailey is a 75 y.o. F w/ GBM s/p resection (Feb 2021), depression/anxiety, HTN, asthma, and GERD, who was transferred to [facility] for consideration of inpt rad/onc treatment while she continues to be treated for complex presentation of acute hypoxic respiratory failure (due to possible PJP pneumonia and/or TRALI), 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 Stroke code called on 5/11, suspect seizure and being in post-ictal state.  Transferred to TCU 5/15/2021 for ongoing rehab needs    #Acute hypoxic respiratory failure   #Bilateral Pneumonia; presumed PJP s/p ABx treatment  #Concern for transfusion-related acute lung injury (TRALI)  #Concern for R hemidiaphragm paralysis, possible volume overload  Resp status stable  Pt remains on single strength Bactrim daily until 1.5 month following end of steroids. Bipap at night and prn  Monitor volume status, BMP, in view of dysphagia diet with thickened liquids, may need a reduction in diuretic  BMP added for today    Dysphagia,  Unclear if related to generalized weakness  Plan DD1 diet, nectar thick liquids    Glioblastoma Multiforme  Left frontoparietal brain glioblastoma status post resection 2/23/2021. Seen by radiation oncology 3/24 recommended XRT and chemo radiation. However subsequently admitted for resp failure. MRI brain here signs of possible disease recurrence. Hem/onc concerned that she might not be a candidate for  chemotherapy or radiation currently due to poor performance status.  - Radiation course initiated as inpatient 5/4 for 15 sessions  -Heme/onc consulted,  not a candidate for chemo due to poor functional status   would consider temozolomide at late date pending clinical course (needs to improve performance status)  - Levetiracetam 750mg BID  - Dexamethasone completing taper currently  1mg Qday end after tomorrow     #Acute Encephalopathy, Resolved  #Seizure, Resolved  Patient developed sudden change in mental status on 5/11 where there was decreased responsiveness, increased droop in right eye. Patient was seen 1 hr prior to event, no trauma. Concern immediately for stroke versus seizure. Stroke ode called after rapid response. CTA negative for acute bleed or vascular stenosis/obstruction. Neurology suspected post-ictal state following seizure. After increase dosage of levetiracetam, no additional seizure activity has been observed.  - Follow up with Dr. Baker   - Levetiracetam 750 mg BID     #Major depressive disorder, recurrent  #Anxiety  Follows with psychiatry and psychology as outpatint.  Saw health psychology during admission for GBM, made plan for outpatient follow-up and med adjustment which she was unable to complete. While inpatient, she met with health psychology and they recommended to continue health psychology while at [facility].  - Seroquel 25 mg BID + 50 mg at bedtime (see Dr Madison from psychiatry note)  - Ativan 0.5 mg q4h PRN  - Scheduled doxepin PRN at night for sleep  - c/t PTA Buspar  - PTA prozac (dose reduced from 80 to 60 mg)    #Bilateral lower extremity DVT  #Right retroperitoneal hematoma   On 3/29 at outside hospital patient's O2 needs increased, duplex ultrasound revealed bilateral lower extremity DVT. CT PE negative for embolism.  Treated with IV heparin and transitioned to Lovenox 70 mg.  On 4/14 this was complicated by retroperitoneal bleed, requiring 4 units of packed red blood cells.   Lovenox was held, and IVC filter was placed on 4/16. Vascular surgery was involved, and a CT abdomen was stable bleed so no surgical intervention was required.  Eventually resumed on prophylactic 40mg of Lovenox twice daily.  -Continue 40 mg Lovenox qday  LLE U/S 5/8--neg for acute DVT      #Ileus, resolved  Began after bleed at outside hospital 4/14; was on TPN for nutrition.    + occ loose stols  Current owel regimen: linaclotide, mesalamine, polyethylene glycol PRN, simethicone PRN  Mesalamine was started for rectal inflammation--has been receiving daily for several weeks  Plan continue same regimen, however, will discontinue mesalamine (not a usual med for Pt)    #Hypertension  Stable on amlodipine  Plan monitor BP    #Steroid-induced hyperglycemia   Intermittently requiring insulin while on prednisone and on dexamethasone  BG nl   Plan surveillance BG monitoring, discontinue sliding scale insulin coverage.

## 2021-05-18 NOTE — PLAN OF CARE
Patient sleeping most of this shift, pure wick cath in use, assist with repositioning through this shift. Patient denies pain and SOB, call light is within reach and she is able to make needs known, continue plan of care.      Patient's most recent vital signs are:     Vital signs:  BP: 121/67  Temp: 96.5  HR: 89  RR: 18  SpO2: 95 %     Patient does not have new respiratory symptoms.  Patient does not have new sore throat.  Patient does not have a fever greater than 99.5.

## 2021-05-18 NOTE — PROGRESS NOTES
"   05/18/21 1400   General Information   Patient Profile Review See Profile for full history and prior level of function   Daily Contact with Relatives or Friends Phone call   Community Involvement   Community Involvement Retired   Sees [facility] ? No   Hobbies/Interests   Cards Cribbage   Games Other (comments)  (linh)   Word Puzzles Word Search   Craft/Art Needlework   Music   Listens to Recorded Music No   Media   Newspapers Daily   Reading Magazines;Books   Reading Preferences History;Mystery;Other (see comments)  (romance)   Sports / Physical Activities   Sports/Exercise Swim;Walks   Impression   Open to Socializing with Others Independent   Treatment Plan   Interested in Unit [place]? No   Assessment Therapeutic Recreation: Assessment completed, pt is pleasant and appreciative of time spent.  Pt is independent with phone calls to family, though stated she has a phone full of messages to return but does not have the motivation to do it, or the motivation to do other leisure activities. This writer asked pt if she might be depressed, pt appeared surprised by the question and admitted that she was \"more depressed than usual\". Pt agreed that therapist could talk to nurse about a conversation with the doctor about her current antidepressant. Pt also agreed to allow therapist to locate her word searches so she could utilize them. Will provide check in for materials as needed.     "

## 2021-05-18 NOTE — PLAN OF CARE
Discharge Planner Post-Acute Rehab SLP:     Discharge Plan: home with assist , further SLP pending progress    Precautions:fall, swallowing    Current Status:  Communication: Moderate- higher level language deficits.  Cognition: Moderate cognitive deficits - attention, memory, reasoning/problem solving.  Swallow: NDD1 diet, nectar fluids. VFSS 4/28/2021    Assessment: Information cog/ling evaluation completed. A combination of deficits identified. Goals for mod-high level language comprehension and expression tasks and goals for moderate level cognitive tasks.     Other Barriers to Discharge (Family Training, etc): *TBD

## 2021-05-18 NOTE — PLAN OF CARE
Discharge Planner Post-Acute Rehab PT:     Discharge Plan: Discharge to [facility] home with home PT at mod I with fww    Precautions: Fall precaution    Current Status:  Bed Mobility: Needs min A for getting to/from edge of bed  Transfer: Liko or Ax2 for SPT with fww; min/modA sit<>stand from EOB using ww  Gait: Not tested at this time due to fatigue and weakness  Stairs: Not tested at this time due to fatigue and weakness  Balance: Pt has fair sitting balance at edge of bed, but declines with fatigue.  Pt has fair standing balance with UE support.    Assessment:  Improved sit<>stands this session. Tends to post lean, improved with subsequent trials.    Other Barriers to Discharge (DME, Family Training, etc): Stairs, family training

## 2021-05-18 NOTE — PROGRESS NOTES
05/18/21 1038   General Information   Onset of Illness/Injury or Date of Surgery 03/26/21   Referring Physician Dr. Odonnell   Pertinent History of Current Problem Olga Bailey is a 75 y.o. F w/ GBM s/p resection (Feb 2021), depression/anxiety, HTN, asthma, and GERD, who was transferred to [facility] for consideration of inpt rad/onc treatment while she continues to be treated for complex presentation of acute hypoxic respiratory failure (due to possible PJP pneumonia and/or TRALI), 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 Stroke code called on 5/11, suspect seizure and being in post-ictal state.. pt referred for swallowing evaluation, downgraded to NDD1/on nectar (per pt choice did not want NDD2). also had been seen prior ARU admission, OP and for communication evaluation/swallowing during hospitalization. Language and cognitive deficits ongoing PT had VFSS 4/28/2021 with aspiration on thin fluids    General Observations Pt is alert and agreeable. She feels her cog/ling skills have deteriorated since her previous admission here.   Type of Evaluation   Type of Evaluation Speech, Language, Cognition   Auditory Comprehension   Comment, Assessment (Auditory Comprehension) WFL for basic-moderate level tasks. Suspect difficulty at more complex and multi step level    Verbal Expression   Comment, Assessment (Verbal Expression) Functional in achieving communication interactions. Occasional word finding noted or semantically similar words and phonemic errors    Cognition   Cognitive Function attention deficit;executive function deficit;memory deficit   Attention Deficit (Cognition) moderate deficit;concentration;focused/sustained attention;selective attention;alternating attention;divided attention   Executive Function Deficit (Cognition) moderate deficit;information processing;judgment;organization/sequencing;planning/decision-making;problem-solving/reasoning   Memory Deficit (Cognition)  moderate deficit;short-term memory   General Therapy Interventions   Planned Therapy Interventions Language;Cognitive Treatment   SLP Therapy Assessment/Plan   Criteria for Skilled Therapeutic Interventions Met (SLP Eval) yes;treatment indicated   SLP Diagnosis Moderate cognitive/linguistic deficits    Rehab Potential (SLP Eval) good, to achieve stated therapy goals   Therapy Frequency (SLP Eval) 5 times/wk   Predicted Duration of Therapy Intervention (SLP Eval) 2 weeks    Comment, Therapy Assessment/Plan (SLP) Pt seen for cognitive/linguistic evaluation. Pt presents with a combination of cognitive and language deficits that appears impacted by general fatigue on time of evaluation. She presents with intermittent word finding, occasional phonemic errors or word replacements. She has good awareness of her errors but is not always able to self correct. She also presents with impairments in attention, memory, and reasoning. Pt reports feeling she understands most things but acknowledges larger amounts of information are more difficulty. Initiate SLP services for mod-high level language comprehension and expression and basic-mod level attention, reasoning/problem solving, and memory.    SLP Discharge Planning    SLP Discharge Recommendation (DC Rec) home with outpatient speech therapy   SLP Rationale for DC Rec Swallow functional and cog/ling skills are below baseline    SLP Brief overview of current status  . Initiate SLP services for mod-high level language comprehension and expression and basic-mod level attention, reasoning/problem solving, and memory.    Therapy Certification   Start of Care Date 05/17/21   Certification date from 05/17/21   Certification date to 06/16/21   Medical Diagnosis Moderate cognitive/linguistic deficits     Total Evaluation Time   Total Evaluation Time (Minutes) 35

## 2021-05-18 NOTE — PLAN OF CARE
Discharge Planner Post-Acute Rehab OT:     Discharge Plan: discharge to dtr's home, pt will need to be home alone intermittently, home health and home OT    Precautions: weakness RUE, poor midline sitting unsupported, falls    Current Status:  ADLs: Pt requires set-up for gr/hyg and oral cares, min A UB dressing supported seated in bed, max A LB dressing, mod A supine to sit w/ HOB raised and use of side rail, max A STS which was tiring for pt, Max A bathing in rolling shower chair, pt able to participate in hair washing, UB sponge bathing, and cleansing brad area  IADLs: Pt had been IND with IADLs PTA.  Vision/Cognition: Pt wears glasses and notes a change in her cognition.  She is partially oriented and follows directions.     Assessment: Tx focus on activity tolerance, core strengthening, and dressing to increase IND with functional transfers and BADLs. Pt appears flat throughout session yet engaged and able to follow cues. Pt soiled upon arrival. Pt agreeable to sitting EOB to trial LB dressing. Pt able to shift hips forward towards EOB with extra time and verbal cues using bed rail. Attempted STS from EOB to pull up brief but unsuccessful d/t weakness/fatigue. Th provided Max A for unsupported sitting with frequent verbal cues to use bed rail and trunk extension to increase IND- pt then able to sit with CGA when given these cues until fatigue limiting. Heavy R side lean when fatigued. Pt tolerated approx 15 minutes sitting EOB. Returned to supine to complete LB dressing. Pt demod ability to assist with pulling up brief through weight shifting and bridging. Con't OT per POC.    Daughter, Patrizia, called during session. Provided update and goals for OT. Daughter appreciative of update and is very supportive. Daughter encouraging nursing to get patient up in chair throughout day when not with therapies.    Other Barriers to Discharge (DME, Family Training, etc): Family training, consider AE for bathroom. Pt will be  on the main floor at dtr's home for kitching/living/bathroom but her bed is upstairs.  Dtr has tub/shower upstairs, toilet on the main floor and walk in shower on the lower level.  Pt is used to a high toilet at her own house and a walk in shower with grab bars.

## 2021-05-19 ENCOUNTER — ALLIED HEALTH/NURSE VISIT (OUTPATIENT)
Dept: RADIATION ONCOLOGY | Facility: CLINIC | Age: 76
End: 2021-05-19
Attending: STUDENT IN AN ORGANIZED HEALTH CARE EDUCATION/TRAINING PROGRAM
Payer: MEDICARE

## 2021-05-19 DIAGNOSIS — C71.9 GLIOBLASTOMA (H): Primary | ICD-10-CM

## 2021-05-19 LAB
GLUCOSE BLDC GLUCOMTR-MCNC: 102 MG/DL (ref 70–99)
GLUCOSE BLDC GLUCOMTR-MCNC: 134 MG/DL (ref 70–99)

## 2021-05-19 PROCEDURE — 97110 THERAPEUTIC EXERCISES: CPT | Mod: GP | Performed by: PHYSICAL THERAPIST

## 2021-05-19 PROCEDURE — 250N000012 HC RX MED GY IP 250 OP 636 PS 637: Performed by: INTERNAL MEDICINE

## 2021-05-19 PROCEDURE — 97535 SELF CARE MNGMENT TRAINING: CPT | Mod: GO

## 2021-05-19 PROCEDURE — 250N000013 HC RX MED GY IP 250 OP 250 PS 637: Performed by: INTERNAL MEDICINE

## 2021-05-19 PROCEDURE — 97530 THERAPEUTIC ACTIVITIES: CPT | Mod: GP | Performed by: PHYSICAL THERAPIST

## 2021-05-19 PROCEDURE — 97116 GAIT TRAINING THERAPY: CPT | Mod: GP | Performed by: PHYSICAL THERAPIST

## 2021-05-19 PROCEDURE — 250N000011 HC RX IP 250 OP 636: Performed by: INTERNAL MEDICINE

## 2021-05-19 PROCEDURE — 82962 GLUCOSE BLOOD TEST: CPT | Mod: 91

## 2021-05-19 PROCEDURE — 99307 SBSQ NF CARE SF MDM 10: CPT | Performed by: INTERNAL MEDICINE

## 2021-05-19 PROCEDURE — 92526 ORAL FUNCTION THERAPY: CPT | Mod: GN | Performed by: SPEECH-LANGUAGE PATHOLOGIST

## 2021-05-19 PROCEDURE — 120N000009 HC R&B SNF

## 2021-05-19 PROCEDURE — 82962 GLUCOSE BLOOD TEST: CPT

## 2021-05-19 PROCEDURE — 77386 HC IMRT TREATMENT DELIVERY, COMPLEX: CPT | Performed by: STUDENT IN AN ORGANIZED HEALTH CARE EDUCATION/TRAINING PROGRAM

## 2021-05-19 PROCEDURE — 77427 RADIATION TX MANAGEMENT X5: CPT | Performed by: STUDENT IN AN ORGANIZED HEALTH CARE EDUCATION/TRAINING PROGRAM

## 2021-05-19 PROCEDURE — 77336 RADIATION PHYSICS CONSULT: CPT | Performed by: STUDENT IN AN ORGANIZED HEALTH CARE EDUCATION/TRAINING PROGRAM

## 2021-05-19 RX ORDER — FUROSEMIDE 20 MG
20 TABLET ORAL DAILY
Status: DISCONTINUED | OUTPATIENT
Start: 2021-05-19 | End: 2021-05-26 | Stop reason: HOSPADM

## 2021-05-19 RX ORDER — CALCIUM POLYCARBOPHIL 625 MG 625 MG/1
625 TABLET ORAL DAILY
Status: DISCONTINUED | OUTPATIENT
Start: 2021-05-19 | End: 2021-05-26 | Stop reason: HOSPADM

## 2021-05-19 RX ADMIN — BUSPIRONE HYDROCHLORIDE 30 MG: 10 TABLET ORAL at 21:47

## 2021-05-19 RX ADMIN — BUSPIRONE HYDROCHLORIDE 30 MG: 10 TABLET ORAL at 08:45

## 2021-05-19 RX ADMIN — PANTOPRAZOLE SODIUM 40 MG: 40 TABLET, DELAYED RELEASE ORAL at 16:23

## 2021-05-19 RX ADMIN — QUETIAPINE FUMARATE 50 MG: 25 TABLET ORAL at 21:47

## 2021-05-19 RX ADMIN — ENOXAPARIN SODIUM 40 MG: 40 INJECTION SUBCUTANEOUS at 16:23

## 2021-05-19 RX ADMIN — SULFAMETHOXAZOLE AND TRIMETHOPRIM 1 TABLET: 400; 80 TABLET ORAL at 08:45

## 2021-05-19 RX ADMIN — FLUOXETINE 60 MG: 20 CAPSULE ORAL at 08:39

## 2021-05-19 RX ADMIN — QUETIAPINE FUMARATE 25 MG: 25 TABLET ORAL at 16:23

## 2021-05-19 RX ADMIN — FUROSEMIDE 20 MG: 20 TABLET ORAL at 08:44

## 2021-05-19 RX ADMIN — QUETIAPINE FUMARATE 25 MG: 25 TABLET ORAL at 08:44

## 2021-05-19 RX ADMIN — LEVETIRACETAM 750 MG: 750 TABLET, FILM COATED ORAL at 08:45

## 2021-05-19 RX ADMIN — Medication 2.5 MG: at 08:39

## 2021-05-19 RX ADMIN — PANTOPRAZOLE SODIUM 40 MG: 40 TABLET, DELAYED RELEASE ORAL at 06:27

## 2021-05-19 RX ADMIN — LINACLOTIDE 290 MCG: 145 CAPSULE, GELATIN COATED ORAL at 06:27

## 2021-05-19 RX ADMIN — LEVETIRACETAM 750 MG: 750 TABLET, FILM COATED ORAL at 21:53

## 2021-05-19 RX ADMIN — DEXAMETHASONE 1 MG: 1 TABLET ORAL at 08:44

## 2021-05-19 RX ADMIN — DOXEPIN HYDROCHLORIDE 3 MG: 10 SOLUTION ORAL at 21:46

## 2021-05-19 RX ADMIN — AMLODIPINE BESYLATE 5 MG: 5 TABLET ORAL at 08:46

## 2021-05-19 NOTE — PROGRESS NOTES
Calorie Counts    5/17: 1190 kcal and 35 g protein (2 meals, 0 supplements - lunch ordered but not recorded)  5/18: 1325 kcal and 68 g protein (3 meals, 1 Magic Shake)  5/19: 1320 kcal and 51 g protein (4 meals, 0 supplements) - diet advanced to DD2 at noon.  5/20: 2025 kcal and 71 g protein (3 meals, 0 supplements)     3 day average PO intake = 1465 kcal (100% minimum energy needs) and 56 g protein (99% minimum protein needs)    Given improving and adequate intakes, will discontinue calorie counts.       Alicia Francis RD, LD  TCU/8A Pager (M-F): [phone]  Weekend Pager ([place]): [phone]

## 2021-05-19 NOTE — PLAN OF CARE
Discharge Planner Post-Acute Rehab SLP:      Discharge Plan: home with assist , further SLP pending progress     Precautions:fall, swallowing     Current Status:  Communication: Moderate- higher level language deficits.  Cognition: Moderate cognitive deficits - attention, memory, reasoning/problem solving.  Swallow: NDD2 diet, nectar fluids (textures advanced 5/19/2021). VFSS 4/28/2021     Assessment:  Patient seen at meal to assess readiness for diet texture advancement. No overt aspiration signs occurred across consistencies (no coughing, no throat clearing, vocal quality remained stable). Note slow but functional mastication of solid textures and no minimal to no oral residue remained after swallows. Patient took small bites/sips at appropriate pace. Patient identified need to take small bites independently.     Recommend advance diet to dysphagia level 2 with nectar thick liquids given swallow strategies (fully upright position and in chair if possible, small bites/sips, slow rate, alternate consistencies). Recommend repeat video swallow study later this week to determine readiness for liquid advancement; previous video swallow study 4/28/2021.    Other Barriers to Discharge (Family Training, etc): *TBD

## 2021-05-19 NOTE — PLAN OF CARE
VS: /88 (BP Location: Right arm)   Pulse 111   Temp 98.1  F (36.7  C) (Oral)   Resp 18   Wt 70.3 kg (155 lb)   SpO2 96%   BMI 27.46 kg/m     O2: RA    Output: New purewick placed per pt request    Last BM: 5/19, loose   Activity: Liko lift    Skin: Intact    Pain: Denies    CMS: Intact    Dressing: None    Diet: DD2, nectar thick  Calorie counts maintained   BG= 102 this morning    LDA: None    Equipment: Liko, bedpan, purewick    Plan: Radiation appointment at 2 pm, pt arrived back to facility at 3. Continue plan of care.    Additional Info: Pt is A&O, intermittently forgetful. Able to make needs known, and very pleasant. Call light within reach.           Patient's most recent vital signs are:     Vital signs:  BP: 133/88  Temp: 98.1  HR: 111  RR: 18  SpO2: 96 %     Patient does not have new respiratory symptoms.  Patient does not have new sore throat.  Patient does not have a fever greater than 99.5.

## 2021-05-19 NOTE — PLAN OF CARE
Discharge Planner Post-Acute Rehab PT:     Discharge Plan: Discharge to [facility] home with home PT at mod I with fww    Precautions: Fall precaution    Current Status:  Bed Mobility: Needs min A for getting to/from edge of bed  Transfer: Liko or Ax2 for SPT with fww; min/modA sit<>stand from EOB using ww  Gait:15 ft, min A, w/c follow  Stairs: Not tested at this time due to fatigue and weakness  Balance: Pt has fair sitting balance at edge of bed, but declines with fatigue.  Pt has fair standing balance with UE support.    Assessment: Pt excited about walking in // bars 15 ft today--still needs mod A for transfers and cues for processing sequence.     Other Barriers to Discharge (DME, Family Training, etc): Stairs, family training

## 2021-05-19 NOTE — PLAN OF CARE
Discharge Planner Post-Acute Rehab OT:     Discharge Plan: discharge to [facility]'s home, pt will need to be home alone intermittently, home health and home OT    Precautions: weakness RUE, poor midline sitting unsupported, falls    Current Status:  ADLs: Pt requires set-up for gr/hyg and oral cares, min A UB dressing supported seated in bed, max A LB dressing, mod A supine to sit w/ HOB raised and use of side rail, max A STS which was tiring for pt, Max A bathing in rolling shower chair, pt able to participate in hair washing, UB sponge bathing, and cleansing brad area  IADLs: Pt had been IND with IADLs PTA.  Vision/Cognition: Pt wears glasses and notes a change in her cognition.  She is partially oriented and follows directions.     Assessment:  Pt able to wash front brad area reclined in bed after set-up.  Max A LB dressing but pt participated in rolling, pulling pants up.  Mod A supine to sit and assist of 2 for bed to w/c transfer.  Pt leaning to L while on EOB but backwards during the transfer.  Pt able to complete UB cares and donning gown seated at the sink.  Pt will try to stay up in the w/c for breakfast.  Progress noted with pt's STS and pivoting. Con't OT per POC.    Daughter, Patrizia, has called during session. Provided update and goals for OT. Daughter appreciative of update and is very supportive. Daughter encouraging nursing to get patient up in chair throughout day when not with therapies.    Other Barriers to Discharge (DME, Family Training, etc): Family training, consider AE for bathroom. Pt will be on the main floor at [facility]'s home for kitching/living/bathroom but her bed is upstairs.  [facility] has tub/shower upstairs, toilet on the main floor and walk in shower on the lower level.  Pt is used to a high toilet at her own house and a walk in shower with grab bars.

## 2021-05-19 NOTE — PROGRESS NOTES
Pain Assessment    The following is the pain interview as conducted by the TCU RN caring for the patient on 5/19/21. This assessment is required by the [facility] for all patients in [place] SNF (Skilled Nursing Facilities).       1. Have you had pain or hurting at any time in the last 5 days?     [x]YES  [x]NO  []UNABLE TO ANSWER   []Not applicable    2. How much of the time have you experienced pain or hurting over the last 5 days?    []ALMOST CONSTANTLY []FREQUENTLY []OCCASIONALLY []RARELY []UNABLE TO ANSWER [x]Not applicable      3. Over the past 5 days, has pain made it hard for you to sleep at night?     []YES  []NO  []UNABLE TO ANSWER   [x]Not applicable    4. Over the past 5 days, have you limited your day-to-day activities because of pain?     []YES  []NO  []UNABLE TO ANSWER    [x]Not applicable    5. Pain intensity (Numeric scale used first-if patient unable to answer, verbal scale to be used.)    Numeric Scale: Please rate your worst pain over the last 5 days on a zero to ten scale, with zero being no pain and ten being the worst pain you can imagine.     Number:      n/a    /10    Verbal Scale: Please rate the intensity of your worst pain over the last 5 days.     [x]Not applicable []MILD/MODERATE-SEVERE []VERY SEVERE/HORRIBLE []UNABLE TO ANSWER       Gabriela Morgan RN, BSN  [facility]      [address] [address]  [email or web address]  [email or web address]   Office:[phone]  Gender pronouns: she/her

## 2021-05-19 NOTE — PLAN OF CARE
The patient is alert and oriented x 3 with intermittent forgetfulness. VSS. Able to make needs known. Denies HA, chest pain, SOB or N/V. Up in the chair for dinner. Appetite is good. Continue to monitor for comfort.         Patient's most recent vital signs are:     Vital signs:  BP: 134/81  Temp: 97.5  HR: 97  RR: 18  SpO2: 95 %     Patient does not have new respiratory symptoms.  Patient does not have new sore throat.  Patient does not have a fever greater than 99.5.

## 2021-05-19 NOTE — PLAN OF CARE
Patient able to reposition self in bed, denied pain and SOB, using pure wick cath. Call light is within reach and she is able to make needs known, continue plan of care.      Patient's most recent vital signs are:     Vital signs:  BP: 134/81  Temp: 97.5  HR: 97  RR: 18  SpO2: 95 %     Patientdoes not have new respiratory symptoms.  Patient does not have new sore throat.  Patient does not have a fever greater than 99.5.

## 2021-05-19 NOTE — PROGRESS NOTES
Patient was seen, course reviewed with staff.    Discussed with patient's daughter in detail.    Patient has complained of frequent loose stools  She has not had abdominal pain nausea or vomiting.  Appetite  Appears to be fair.  Speech therapy is following and is upgrading her diet.  Patient is slowly progressing in therapy.    Vital signs have been stable    I reviewed the course of her bowel issues in the setting of her prolonged recent illness.  She did develop an ileus related to severe rectal impaction with evidence on CT of inflammation of the rectum she has been receiving Linzess daily in addition to as needed senna and MiraLAX.  Mesalamine suppositories were discontinued yesterday.  She is receiving oxycodone 3 times a day scheduled which was originally ordered to manage dyspnea.   She no longer is complaining of dyspnea.    On exam  She is afebrile  Abdomen is soft, nontender, nondistended      Assessment    History of ileus in the setting of acute illness, associated with stool impaction with rectal inflammation.  She is now having loose stools, which may be related to Linzess.  I doubt that this represents stooling around an impaction    Plan  Decrease Linzess to every other day  Start fiber supplement  Start prune juice  Change oxycodone to twice daily as needed  Monitor bowel status closely  Avoid antidiarrheal medications

## 2021-05-20 ENCOUNTER — APPOINTMENT (OUTPATIENT)
Dept: RADIATION ONCOLOGY | Facility: CLINIC | Age: 76
End: 2021-05-20
Attending: STUDENT IN AN ORGANIZED HEALTH CARE EDUCATION/TRAINING PROGRAM
Payer: MEDICARE

## 2021-05-20 DIAGNOSIS — C71.9 GLIOBLASTOMA (H): Primary | ICD-10-CM

## 2021-05-20 LAB
GLUCOSE BLDC GLUCOMTR-MCNC: 138 MG/DL (ref 70–99)
GLUCOSE BLDC GLUCOMTR-MCNC: 155 MG/DL (ref 70–99)
GLUCOSE BLDC GLUCOMTR-MCNC: 247 MG/DL (ref 70–99)
GLUCOSE BLDC GLUCOMTR-MCNC: 98 MG/DL (ref 70–99)

## 2021-05-20 PROCEDURE — 250N000012 HC RX MED GY IP 250 OP 636 PS 637: Performed by: INTERNAL MEDICINE

## 2021-05-20 PROCEDURE — 97530 THERAPEUTIC ACTIVITIES: CPT | Mod: GP

## 2021-05-20 PROCEDURE — 250N000013 HC RX MED GY IP 250 OP 250 PS 637: Performed by: INTERNAL MEDICINE

## 2021-05-20 PROCEDURE — 82962 GLUCOSE BLOOD TEST: CPT

## 2021-05-20 PROCEDURE — 77386 HC IMRT TREATMENT DELIVERY, COMPLEX: CPT | Performed by: STUDENT IN AN ORGANIZED HEALTH CARE EDUCATION/TRAINING PROGRAM

## 2021-05-20 PROCEDURE — 97110 THERAPEUTIC EXERCISES: CPT | Mod: GO

## 2021-05-20 PROCEDURE — 250N000011 HC RX IP 250 OP 636: Performed by: INTERNAL MEDICINE

## 2021-05-20 PROCEDURE — 92526 ORAL FUNCTION THERAPY: CPT | Mod: GN | Performed by: SPEECH-LANGUAGE PATHOLOGIST

## 2021-05-20 PROCEDURE — 120N000009 HC R&B SNF

## 2021-05-20 PROCEDURE — 97535 SELF CARE MNGMENT TRAINING: CPT | Mod: GO

## 2021-05-20 PROCEDURE — 97110 THERAPEUTIC EXERCISES: CPT | Mod: GP

## 2021-05-20 RX ADMIN — SULFAMETHOXAZOLE AND TRIMETHOPRIM 1 TABLET: 400; 80 TABLET ORAL at 09:03

## 2021-05-20 RX ADMIN — ENOXAPARIN SODIUM 40 MG: 40 INJECTION SUBCUTANEOUS at 17:41

## 2021-05-20 RX ADMIN — DEXAMETHASONE 1 MG: 1 TABLET ORAL at 09:04

## 2021-05-20 RX ADMIN — FUROSEMIDE 20 MG: 20 TABLET ORAL at 09:04

## 2021-05-20 RX ADMIN — LINACLOTIDE 290 MCG: 145 CAPSULE, GELATIN COATED ORAL at 09:03

## 2021-05-20 RX ADMIN — FLUOXETINE 60 MG: 20 CAPSULE ORAL at 09:03

## 2021-05-20 RX ADMIN — CALCIUM POLYCARBOPHIL 625 MG: 625 TABLET, FILM COATED ORAL at 09:03

## 2021-05-20 RX ADMIN — LEVETIRACETAM 750 MG: 750 TABLET, FILM COATED ORAL at 20:29

## 2021-05-20 RX ADMIN — BUSPIRONE HYDROCHLORIDE 30 MG: 10 TABLET ORAL at 20:29

## 2021-05-20 RX ADMIN — QUETIAPINE FUMARATE 50 MG: 25 TABLET ORAL at 21:13

## 2021-05-20 RX ADMIN — DOXEPIN HYDROCHLORIDE 3 MG: 10 SOLUTION ORAL at 21:13

## 2021-05-20 RX ADMIN — QUETIAPINE FUMARATE 25 MG: 25 TABLET ORAL at 09:03

## 2021-05-20 RX ADMIN — LEVETIRACETAM 750 MG: 750 TABLET, FILM COATED ORAL at 09:03

## 2021-05-20 RX ADMIN — PANTOPRAZOLE SODIUM 40 MG: 40 TABLET, DELAYED RELEASE ORAL at 06:08

## 2021-05-20 RX ADMIN — AMLODIPINE BESYLATE 5 MG: 5 TABLET ORAL at 09:04

## 2021-05-20 RX ADMIN — PANTOPRAZOLE SODIUM 40 MG: 40 TABLET, DELAYED RELEASE ORAL at 17:41

## 2021-05-20 RX ADMIN — QUETIAPINE FUMARATE 25 MG: 25 TABLET ORAL at 17:41

## 2021-05-20 RX ADMIN — BUSPIRONE HYDROCHLORIDE 30 MG: 10 TABLET ORAL at 09:03

## 2021-05-20 NOTE — PLAN OF CARE
No acute issues overnight. Sleeping well as noted during rounds. Repositions indep. Purewick intact/patent. Has no c/o's pain, discomfort, CP and SOB. DD2 / NTL. Radiation pickup @ 1330 via wheelchair. Liko for transfers / per daughter; staff to encourage up in chair when not with therapies.      Patient's most recent vital signs are:     Vital signs:  BP: 125/74  Temp: 98.5  HR: 99  RR: 18  SpO2: 96 %     Patient does not have new respiratory symptoms.  Patient does not have new sore throat.  Patient does not have a fever greater than 99.5.

## 2021-05-20 NOTE — LETTER
2021         RE: Olga Bailey  [address] [facility] [address]        Dear Colleague,    Thank you for referring your patient, Olga Bailey, to the [facility] RADIATION [facility]COLOGY. Please see a copy of my visit note below.    [facility]YSICI[facility]  SPECIALIZING IN BREAKTHROUGHS  Radiation Oncology    On Treatment Visit Note      Olga Bailey      Date: May 20, 2021   MRN: [id number]   : 1945    Diagnosis: GBM    Reason for Visit:  On Radiation Treatment Visit     Treatment Summary to Date  Treatment Site: Brain Current Dose: 2670/4005 cGy Fractions: 10/15      Chemotherapy  Chemo concurrent with radx?: No    Subjective:  Ms. Bailey appears well today. She presents in a wheelchair from the TCU. She has been more active at the TCU.      Treatment Breaks: Zac machine down on Wednesday, not treated on Wednesday   ED visits / Hospitalizations: None    Objective:     General: Appears well, in wheelchair   Cardio: Well perfused   Resp: Breathing comfortably      Labs:    Last CBC with Diff  WBC   Date Value Ref Range Status   2021 7.6 4.0 - 11.0 10e9/L Final     RBC Count   Date Value Ref Range Status   2021 3.45 (L) 3.8 - 5.2 10e12/L Final     Hemoglobin   Date Value Ref Range Status   2021 10.5 (L) 11.7 - 15.7 g/dL Final     Hematocrit   Date Value Ref Range Status   2021 32.9 (L) 35.0 - 47.0 % Final     MCV   Date Value Ref Range Status   2021 95 78 - 100 fl Final     MCH   Date Value Ref Range Status   2021 30.4 26.5 - 33.0 pg Final     MCHC   Date Value Ref Range Status   2021 31.9 31.5 - 36.5 g/dL Final     RDW   Date Value Ref Range Status   2021 19.4 (H) 10.0 - 15.0 % Final     Platelet Count   Date Value Ref Range Status   2021 188 150 - 450 10e9/L Final     Diff Method   Date Value Ref Range Status   2021 Manual Differential  Final     % Neutrophils   Date Value Ref Range Status    04/22/2021 92.0 % Final     % Lymphocytes   Date Value Ref Range Status   04/22/2021 2.0 % Final     % Monocytes   Date Value Ref Range Status   04/22/2021 2.0 % Final     % Eosinophils   Date Value Ref Range Status   04/22/2021 0.0 % Final     % Basophils   Date Value Ref Range Status   04/22/2021 0.0 % Final     Absolute Neutrophil   Date Value Ref Range Status   04/22/2021 14.4 (H) 1.6 - 8.3 10e9/L Final     Absolute Lymphocytes   Date Value Ref Range Status   04/22/2021 0.3 (L) 0.8 - 5.3 10e9/L Final     Absolute Monocytes   Date Value Ref Range Status   04/22/2021 0.3 0.0 - 1.3 10e9/L Final        Last Comprehensive Metabolic Panel:  Sodium   Date Value Ref Range Status   05/21/2021 141 133 - 144 mmol/L Final     Potassium   Date Value Ref Range Status   05/21/2021 3.0 (L) 3.4 - 5.3 mmol/L Final     Chloride   Date Value Ref Range Status   05/21/2021 108 94 - 109 mmol/L Final     Carbon Dioxide   Date Value Ref Range Status   05/21/2021 27 20 - 32 mmol/L Final     Anion Gap   Date Value Ref Range Status   05/21/2021 6 3 - 14 mmol/L Final     Glucose   Date Value Ref Range Status   05/21/2021 94 70 - 99 mg/dL Final     Urea Nitrogen   Date Value Ref Range Status   05/21/2021 18 7 - 30 mg/dL Final     Creatinine   Date Value Ref Range Status   05/21/2021 0.50 (L) 0.52 - 1.04 mg/dL Final     GFR Estimate   Date Value Ref Range Status   05/21/2021 >90 >60 mL/min/[1.73_m2] Final     Comment:     Non  GFR Calc  Starting 12/18/2018, serum creatinine based estimated GFR (eGFR) will be   calculated using the Chronic Kidney Disease Epidemiology Collaboration   (CKD-EPI) equation.       Calcium   Date Value Ref Range Status   05/21/2021 8.5 8.5 - 10.1 mg/dL Final        Assessment:    Tolerating radiation therapy well.  All questions and concerns addressed.    Toxicities:    Plan:   1. Continue current therapy.    2. Steroid taper as tolerated   3. Recommend administration of second COVID vaccine when  able.     Mosaiq chart and setup information reviewed  IGRT images checked    Medication Review    Educational Topic Discussed  Additional Instructions: reviewed follow up plan     Betsy Spann MD, PhD     Department of Radiation Oncology  [facility]

## 2021-05-20 NOTE — PLAN OF CARE
Discharge Planner Post-Acute Rehab PT:     Discharge Plan: Discharge to [facility] home with home PT at [facility] with fww    Precautions: Fall precaution    Current Status:  Bed Mobility: Needs min A for getting to/from edge of bed  Transfer: Gerard stand pivot using ww (nursing to use lift still or A x2); min/modA sit<>stand from EOB using ww, mod/maxA from lower surfaces  Gait:15 ft, min A, w/c follow (parallel bars); 4 ft x1 using ww Gerard  Stairs: Not tested at this time due to fatigue and weakness  Balance: Pt has fair sitting balance at edge of bed, but declines with fatigue.  Pt has fair standing balance with UE support.    Assessment: Pt progressed to amb using ww, in room ~4 ft bed>w/c Gerard. Gerard sit<>Stands from EOB, mod/maxA from high back chair d/t lower surface.    Other Barriers to Discharge (DME, Family Training, etc): Stairs, family training

## 2021-05-20 NOTE — PLAN OF CARE
The patient is alert and oriented x 3 with intermittent forgetfulness. VSS. Able to make needs known. Denies HA, chest pain, SOB or N/V. Radiation appointment today. Tolerated diet advanced to DD2 with nectar thickened liquid. Up in the chair for dinner. Appetite is good. Continue to monitor for comfort.         Patient's most recent vital signs are:     Vital signs:  BP: 125/74  Temp: 98.5  HR: 99  RR: 18  SpO2: 96 %     Patient does not have new respiratory symptoms.  Patient does not have new sore throat.  Patient does not have a fever greater than 99.5.

## 2021-05-20 NOTE — PLAN OF CARE
Discharge Planner Post-Acute Rehab SLP:      Discharge Plan: home with assist , further SLP pending progress     Precautions:fall, swallowing     Current Status:  Communication: Moderate- higher level language deficits.  Cognition: Moderate cognitive deficits - attention, memory, reasoning/problem solving.  Swallow: NDD2 diet, nectar fluids (textures advanced 5/19/2021). VFSS 4/28/2021     Assessment: Patient completed therapeutic PO trials of NDD2 textures (muffin, banana), thin liquids via single and consecutive cup sips, and NTL via single cup sips. No overt s/s of aspiration noted with all PO intake. SILENT aspiration cannot be ruled out at the bedside. Patient oral phase impacted by generalized weakness, resulting in prolonged mastication of solids, but functional bolus breakdown and clearance. Patient IND implements compensatory swallow strategies of small bites/sips and slow rate of intake. VFSS is scheduled to be completed 5/21/21.      Other Barriers to Discharge (Family Training, etc): TBD

## 2021-05-20 NOTE — PLAN OF CARE
Discharge Planner Post-Acute Rehab OT:     Discharge Plan: discharge to [facility]'s home, pt will need to be home alone intermittently, home health and home OT    Precautions: weakness RUE, poor midline sitting unsupported, falls    Current Status:  ADLs: Pt requires set-up for gr/hyg and oral cares, min A UB dressing supported seated in bed, max A LB dressing, mod A supine to sit w/ HOB raised and use of side rail, max A STS which was tiring for pt, Max A bathing in rolling shower chair, pt able to participate in hair washing, UB sponge bathing, and cleansing brad area  IADLs: Pt had been IND with IADLs PTA.  Vision/Cognition: Pt wears glasses and notes a change in her cognition.  She is partially oriented and follows directions.     Assessment:  SBA to thread shorts seated in chair and min A for slipper socks during LB dressing after set-up, no AE.  Th provided max A for sit to stand and min - mod A during standing x2 w/ 2WW, th pulled pt's pants up and pt used both hands to full up the front while supported.  Improved sit to stand with this OTRL.  MIKE RODRIGES, pt has gained AROM to 90* sh flexion.  She reports she is exercising in bed.  Good gross functional use of ZAHIRA during mobility and ADLs.  Progress in ADLs, sit to stand and R sh flexion.  Con't OT per POC.    Daughter, Patrizia, has called during session. Provided update and goals for OT. Daughter appreciative of update and is very supportive. Daughter encouraging nursing to get patient up in chair throughout day when not with therapies.    Other Barriers to Discharge (DME, Family Training, etc): Family training, consider AE for bathroom. Pt will be on the main floor at [facility]'s home for kitching/living/bathroom but her bed is upstairs.  [facility] has tub/shower upstairs, toilet on the main floor and walk in shower on the lower level.  Pt is used to a high toilet at her own house and a walk in shower with grab bars.

## 2021-05-20 NOTE — PLAN OF CARE
Patient's most recent vital signs are:     Vital signs:  BP: 120/79  Temp: 97.8  HR: 116  RR: 20  SpO2: 96 %     Patient does not have new respiratory symptoms.  Patient does not have new sore throat.  Patient does not have a fever greater than 99.5.    Alert and verbalizes her needs. Up in chair for breakfast-appetite fair. Participates in therapies. Had radiation this afternoon. Assist of 2 or liko lift for transfer. Very weak and deconditioned. Pleasant.

## 2021-05-21 ENCOUNTER — APPOINTMENT (OUTPATIENT)
Dept: GENERAL RADIOLOGY | Facility: CLINIC | Age: 76
End: 2021-05-21
Attending: INTERNAL MEDICINE
Payer: MEDICARE

## 2021-05-21 ENCOUNTER — ALLIED HEALTH/NURSE VISIT (OUTPATIENT)
Dept: RADIATION ONCOLOGY | Facility: CLINIC | Age: 76
End: 2021-05-21
Attending: STUDENT IN AN ORGANIZED HEALTH CARE EDUCATION/TRAINING PROGRAM
Payer: MEDICARE

## 2021-05-21 DIAGNOSIS — C71.9 GLIOBLASTOMA (H): Primary | ICD-10-CM

## 2021-05-21 LAB
ANION GAP SERPL CALCULATED.3IONS-SCNC: 6 MMOL/L (ref 3–14)
BUN SERPL-MCNC: 18 MG/DL (ref 7–30)
CALCIUM SERPL-MCNC: 8.5 MG/DL (ref 8.5–10.1)
CHLORIDE SERPL-SCNC: 108 MMOL/L (ref 94–109)
CO2 SERPL-SCNC: 27 MMOL/L (ref 20–32)
CREAT SERPL-MCNC: 0.5 MG/DL (ref 0.52–1.04)
GFR SERPL CREATININE-BSD FRML MDRD: >90 ML/MIN/{1.73_M2}
GLUCOSE BLDC GLUCOMTR-MCNC: 110 MG/DL (ref 70–99)
GLUCOSE BLDC GLUCOMTR-MCNC: 155 MG/DL (ref 70–99)
GLUCOSE BLDC GLUCOMTR-MCNC: 98 MG/DL (ref 70–99)
GLUCOSE SERPL-MCNC: 94 MG/DL (ref 70–99)
PLATELET # BLD AUTO: 188 10E9/L (ref 150–450)
POTASSIUM SERPL-SCNC: 3 MMOL/L (ref 3.4–5.3)
SODIUM SERPL-SCNC: 141 MMOL/L (ref 133–144)

## 2021-05-21 PROCEDURE — 82962 GLUCOSE BLOOD TEST: CPT

## 2021-05-21 PROCEDURE — 97110 THERAPEUTIC EXERCISES: CPT | Mod: GP

## 2021-05-21 PROCEDURE — 77014 PR CT GUIDE FOR PLACEMENT RADIATION THERAPY FIELDS: CPT | Mod: 26 | Performed by: STUDENT IN AN ORGANIZED HEALTH CARE EDUCATION/TRAINING PROGRAM

## 2021-05-21 PROCEDURE — 82962 GLUCOSE BLOOD TEST: CPT | Mod: 91,GZ

## 2021-05-21 PROCEDURE — 77386 HC IMRT TREATMENT DELIVERY, COMPLEX: CPT | Performed by: STUDENT IN AN ORGANIZED HEALTH CARE EDUCATION/TRAINING PROGRAM

## 2021-05-21 PROCEDURE — 250N000013 HC RX MED GY IP 250 OP 250 PS 637: Performed by: INTERNAL MEDICINE

## 2021-05-21 PROCEDURE — 120N000009 HC R&B SNF

## 2021-05-21 PROCEDURE — 97535 SELF CARE MNGMENT TRAINING: CPT | Mod: GO

## 2021-05-21 PROCEDURE — 74230 X-RAY XM SWLNG FUNCJ C+: CPT

## 2021-05-21 PROCEDURE — 74230 X-RAY XM SWLNG FUNCJ C+: CPT | Mod: 26 | Performed by: RADIOLOGY

## 2021-05-21 PROCEDURE — 250N000011 HC RX IP 250 OP 636: Performed by: INTERNAL MEDICINE

## 2021-05-21 PROCEDURE — 97530 THERAPEUTIC ACTIVITIES: CPT | Mod: GP

## 2021-05-21 PROCEDURE — 85049 AUTOMATED PLATELET COUNT: CPT | Performed by: INTERNAL MEDICINE

## 2021-05-21 PROCEDURE — 80048 BASIC METABOLIC PNL TOTAL CA: CPT | Performed by: INTERNAL MEDICINE

## 2021-05-21 PROCEDURE — 92611 MOTION FLUOROSCOPY/SWALLOW: CPT | Mod: GN | Performed by: SPEECH-LANGUAGE PATHOLOGIST

## 2021-05-21 PROCEDURE — 250N000012 HC RX MED GY IP 250 OP 636 PS 637: Performed by: INTERNAL MEDICINE

## 2021-05-21 PROCEDURE — 36415 COLL VENOUS BLD VENIPUNCTURE: CPT | Performed by: INTERNAL MEDICINE

## 2021-05-21 RX ORDER — POTASSIUM CHLORIDE 750 MG/1
40 TABLET, EXTENDED RELEASE ORAL EVERY 6 HOURS
Status: COMPLETED | OUTPATIENT
Start: 2021-05-21 | End: 2021-05-21

## 2021-05-21 RX ORDER — BARIUM SULFATE 400 MG/ML
5 SUSPENSION ORAL ONCE
Status: COMPLETED | OUTPATIENT
Start: 2021-05-21 | End: 2021-05-21

## 2021-05-21 RX ADMIN — SULFAMETHOXAZOLE AND TRIMETHOPRIM 1 TABLET: 400; 80 TABLET ORAL at 08:07

## 2021-05-21 RX ADMIN — POTASSIUM CHLORIDE 40 MEQ: 750 TABLET, EXTENDED RELEASE ORAL at 09:24

## 2021-05-21 RX ADMIN — BUSPIRONE HYDROCHLORIDE 30 MG: 10 TABLET ORAL at 08:06

## 2021-05-21 RX ADMIN — BUSPIRONE HYDROCHLORIDE 30 MG: 10 TABLET ORAL at 21:33

## 2021-05-21 RX ADMIN — PANTOPRAZOLE SODIUM 40 MG: 40 TABLET, DELAYED RELEASE ORAL at 16:59

## 2021-05-21 RX ADMIN — PANTOPRAZOLE SODIUM 40 MG: 40 TABLET, DELAYED RELEASE ORAL at 06:25

## 2021-05-21 RX ADMIN — FLUOXETINE 60 MG: 20 CAPSULE ORAL at 08:06

## 2021-05-21 RX ADMIN — AMLODIPINE BESYLATE 5 MG: 5 TABLET ORAL at 08:07

## 2021-05-21 RX ADMIN — FUROSEMIDE 20 MG: 20 TABLET ORAL at 08:07

## 2021-05-21 RX ADMIN — BARIUM SULFATE 15 ML: 400 SUSPENSION ORAL at 11:31

## 2021-05-21 RX ADMIN — ENOXAPARIN SODIUM 40 MG: 40 INJECTION SUBCUTANEOUS at 17:00

## 2021-05-21 RX ADMIN — QUETIAPINE FUMARATE 25 MG: 25 TABLET ORAL at 17:00

## 2021-05-21 RX ADMIN — DOXEPIN HYDROCHLORIDE 3 MG: 10 SOLUTION ORAL at 21:33

## 2021-05-21 RX ADMIN — LEVETIRACETAM 750 MG: 750 TABLET, FILM COATED ORAL at 08:07

## 2021-05-21 RX ADMIN — CALCIUM POLYCARBOPHIL 625 MG: 625 TABLET, FILM COATED ORAL at 08:06

## 2021-05-21 RX ADMIN — POTASSIUM CHLORIDE 40 MEQ: 750 TABLET, EXTENDED RELEASE ORAL at 15:16

## 2021-05-21 RX ADMIN — QUETIAPINE FUMARATE 25 MG: 25 TABLET ORAL at 08:07

## 2021-05-21 RX ADMIN — DEXAMETHASONE 1 MG: 1 TABLET ORAL at 08:07

## 2021-05-21 RX ADMIN — QUETIAPINE FUMARATE 50 MG: 25 TABLET ORAL at 21:33

## 2021-05-21 RX ADMIN — BARIUM SULFATE 15 ML: 400 SUSPENSION ORAL at 11:32

## 2021-05-21 RX ADMIN — LEVETIRACETAM 750 MG: 750 TABLET, FILM COATED ORAL at 21:33

## 2021-05-21 NOTE — PLAN OF CARE
Patient has no new concerns tonight. Sleeping well in between cares. No CP, SOB, no c/o any other pain. Pure wick in place and with good output, lbm 05/20. Independent with repositioning in bed. Routine radiation today at 1400,  at 1330. ON DD2 with nectar thickened liquids. Call light in reach. Continue POC.       Patient's most recent vital signs are:     Vital signs:  BP: 128/80  Temp: 96.5  HR: 113  RR: 18  SpO2: 95 %     Patient does not have new respiratory symptoms.  Patient does not have new sore throat.  Patient does not have a fever greater than 99.5.

## 2021-05-21 NOTE — PLAN OF CARE
Discharge Planner Post-Acute Rehab SLP:      Discharge Plan: home with assist , further SLP pending progress     Precautions:fall, swallowing     Current Status:  Communication: Moderate- higher level language deficits.  Cognition: Moderate cognitive deficits - attention, memory, reasoning/problem solving.  Swallow: NDD2 diet, nectar fluids (textures advanced 5/19/2021). VFSS 4/28 and 5/21/21     Assessment:SLP: pt seen for VFSS. Pt given thin, nectar and honey fluids, pureed and semi -solid textures. Noting silent aspiration during/after with thin fluid by cup, Laryngeal penetration (variable depth) with nectar, inconsistent premature spillage to valleculae. Honey noted WNL, pureed and semi solid noting premature spillage to valleculae (2/2 reduced tongue base elevation) minimal vallecular residue after 2/2 reduced tongue base retraction. Pt continues with moderate oropharyngeal dysphagia , recommend continue nectar fluids, NDD2 diet, sit up for po, small bites and sips slow pace, Monitor for sx aspiration (note pt silent aspiration on thin). Pt is below baseline recommending skilled intervention to improve swallowing on least restrictive diet.     Other Barriers to Discharge (Family Training, etc): TBD

## 2021-05-21 NOTE — PLAN OF CARE
VS: VSS - denies SOB, chest pain, headache and nausea    O2: Sat 95% RA    Output: Incontinent, using Purewick - changed this am with yellow output    Last BM: 05/20, per pt    Activity: Assist of two with gait belt and walker    Skin: Intact with exception to bruising to abdomen, BLE and RLE   Pain: Denies of pain/discomfort    CMS: Denies of numbness/tingling to extremities    Dressing: None    Diet: Reg - fair appetite, 50% intake  BG 98    LDA: None   Equipment: Purewick, lift and wheelchair    Plan: Potassium 3.0 this am, not on protocol. MD placed new orders of potassium 40 mEq q6h initiated this am w/ redraw 05/22 am   Additional Info: Pt is alert and oriented x4. Up in chair for breakfast and lunch. Pt had a video swallow appointment at 1100, result to continue w/ DD2, sitting up w/ PO, small bites - see results. Radiation appt at 1400, pt left unit at 1330 and returned at 1500. Call light within reach and able to make needs known.             Patient's most recent vital signs are:     Vital signs:  BP: 127/83  Temp: 96.3  HR: 98  RR: 18  SpO2: 93 %     Patient does not have new respiratory symptoms.  Patient does not have new sore throat.  Patient does not have a fever greater than 99.5.

## 2021-05-21 NOTE — PROGRESS NOTES
05/21/21 1200   General Information   Onset of Illness/Injury or Date of Surgery 03/26/21   Referring Physician    Patient/Family Therapy Goal Statement (SLP) SLP: wants to upgrade to thin fluids   General Observations SLP: pt had VFSS 4/28 with aspiration on thin fluids, penetration on nectar. Pt had recently advanced to NDD2, but continues on nectar fluids.    Type of Evaluation   Type of Evaluation Swallow Evaluation   General Swallowing Observations   Current Diet/Method of Nutritional Intake (General Swallowing Observations, NIS) nectar-thick liquids;dysphagia level 2 (mechanically altered)   Respiratory Support (General Swallowing Observations) none   Swallowing Evaluation Videofluoroscopic swallow study (VFSS)   VFSS Evaluation   Views Taken left lateral   VFSS Textures Trialed Thin Liquids;Nectar-Thick Liquids;Honey-Thick Liquids;Purees;Semi-Solid   VFSS Eval: Thin Liquid Texture Trial   Mode of Presentation, Thin Liquid cup;self-fed   Order of Presentation 1,2   Preparatory Phase Holding  (slight)   Oral Phase, Thin Liquid Premature pharyngeal entry   Pharyngeal Phase, Thin Liquid Residue in valleculae   Rosenbek's Penetration Aspiration Scale: Thin Liquid Trial Results 8 - contrast passes glottis, visible subglottic residue remains, absent patient response (aspiration)   Diagnostic Statement SLP: pt showed silent aspiration during swallow/after on thin liquid by cup, cued to cough, ?if residue cleared, noted minimal vallecular residue after swlalow, cued to swallow again, difficult to initiate second swallow   VFSS Evaluation: Nectar Thick Liquid Texture Trial   Mode of Presentation, Nectar cup;self-fed   Order of Presentation 3-7   Oral Phase, Nectar Premature pharyngeal entry   Pharyngeal Phase, Nectar Residue in valleculae   Rosenbek's Penetration Aspiration Scale: Nectar-Thick Liquid Trial Results 2 - contrast enters airway, remains above the vocal cords, no residue remains (penetration)    Diagnostic Statement SLP: pt given nectar by cup, noted deep laryngeal penetration, intermittently , chin tuck variable to affect   VFSS Evaluation: Honey Thick Liquid Texture Trial   Mode of Presentation, Honey self-fed   Order of Presentation 8   Preparatory Phase WFL   Pharyngeal Phase, Honey WFL   Rosenbek's Penetration Aspiration Scale: Honey Trial Results 1 - no aspiration, contrast does not enter airway   Diagnostic Statement SLP: honey appears WFL swallow   VFSS Evaluation: Puree Solid Texture Trial   Mode of Presentation, Puree spoon;fed by clinician   Order of Presentation 9   Preparatory Phase Poor bolus control   Oral Phase, Puree Premature pharyngeal entry   Pharyngeal Phase, Puree Residue in valleculae   Rosenbek's Penetration Aspiration Scale: Puree Food Trial Results 1 - no aspiration, contrast does not enter airway   Diagnostic Statement SLP: pureed swallowed, premature spillage 2/2 reduced tongue base elevatin, minimal vallecular residue after 2/2 reduced tongue base retraction   VFSS Evaluation: Semisolid Texture Trial   Mode of Presentation, Semisolid spoon;fed by clinician   Order of Presentation 10   Preparatory Phase Poor bolus control   Oral Phase, Semisolid Premature pharyngeal entry   Pharyngeal Phase, Semisolid Residue in valleculae   Rosenbek's Penetration Aspiration Scale: Semisolid Food Trial Results 1 - no aspiration, contrast does not enter airway   Diagnostic Statement SLP:slight increased mastication time, falls prematurely to valleculae 2/2 reduced tongue base elevation, min vallecular residue, clears with second swallow   Esophageal Phase of Swallow   Esophageal comments GERD reported   Swallowing Recommendations   Diet Consistency Recommendations nectar-thick liquids;dysphagia level 1 (pureed)   Supervision Level for Intake patient independent   Mode of Delivery Recommendations bolus size, small;no straws   Swallowing Maneuver Recommendations extra swallow   Recommended  Feeding/Eating Techniques (Swallow Eval) maintain upright sitting position for eating   Medication Administration Recommendations, Swallowing (SLP)   (with pureed)   SLP Therapy Assessment/Plan   Criteria for Skilled Therapeutic Interventions Met (SLP Eval) yes;treatment indicated   SLP Diagnosis SLP: moderate oropharyngeal dysphagia    Rehab Potential (SLP Eval) good, to achieve stated therapy goals   Therapy Frequency (SLP Eval) 5 times/wk   Predicted Duration of Therapy Intervention (SLP Eval) estimated 2 weeks   Comment, Therapy Assessment/Plan (SLP) SLP: pt seen for VFSS. Pt given thin, nectar and honey fluids, pureed and semi -solid texture Noting silent aspiration during/after with thin fluid by cup, Laryngeal penetration (variable depth) with nectar, inconsistent premature spillage to valleculae. Honey noted WNL, pureed and semi solid noting premature spillage to valleculae (2/2 reduced tongue base elevation) minimal vallecular residue after 2/2 reduced tongue base retraction. Pt continue with moderate oropharyngeal dysphagia , recommend continue nectar fluids, NDD2 diet, sit up for po, small bites and sips slow pace, Monitor for sx aspiration (note pt silent aspiration on thin). Pt is below baseline recommending skilled intervention to improve swallowing on least restrictive diet.   Therapy Plan Review/Discharge Plan (SLP)   Therapy Plan Review (SLP) evaluation/treatment results reviewed;care plan/treatment goals reviewed;risks/benefits reviewed;current/potential barriers reviewed;participants voiced agreement with care plan;participants included;patient   Therapy Certification   Start of Care Date 05/21/21   Certification date from 05/21/21   Certification date to 06/16/21   Medical Diagnosis moderate oropharyngeal dysphagia    Total Evaluation Time   Total Evaluation Time (Minutes) 25

## 2021-05-21 NOTE — PLAN OF CARE
Discharge Planner Post-Acute Rehab PT:     Discharge Plan: Discharge to [facility] home with home PT at [facility] with fww    Precautions: Fall precaution    Current Status:  Bed Mobility: Needs min A for getting to/from edge of bed  Transfer: Gerard stand pivot using ww (nursing to use lift still or A x2); min/modA sit<>stand from EOB using ww, mod/maxA from lower surfaces  Gait:15 ft, min A, w/c follow (parallel bars); 4 ft x1 using ww Gerard  Stairs: Not tested at this time due to fatigue and weakness  Balance: Pt has fair sitting balance at edge of bed, but declines with fatigue.  Pt has fair standing balance with UE support.    Assessment: PTA: Pt unable to progress gait today, difficulty sequencing. However less assist with sit<>stands and improved sitting balance and initial sit>stand balance (decreased post tilt noted). Pt needing to be cleaned up at end of session (urinary incontinence). NA aware.  Other Barriers to Discharge (DME, Family Training, etc): Stairs, family training

## 2021-05-21 NOTE — PLAN OF CARE
Discharge Planner Post-Acute Rehab OT:      Discharge Plan: discharge to [facility]'s home, pt will need to be home alone intermittently, home health and home OT     Precautions: weakness RUE, poor midline sitting unsupported, falls     Current Status:  ADLs: Pt requires set-up for gr/hyg and oral cares, min A UB dressing supported seated in bed, min A LB dressing, mod A supine to sit w/ HOB raised and use of side rail, CGA x2 STS w/fww on 5/21, Max A bathing in rolling shower chair, pt able to participate in hair washing, UB sponge bathing, and cleansing brad area  IADLs: Pt had been IND with IADLs PTA.  Vision/Cognition: Pt wears glasses and notes a change in her cognition.  She is partially oriented and follows directions.      Assessment:  tx focus adl routine, pt set up for face level g/h tasks w/HOB elevated. Min A gown change for ties in back. Pt thread BLE into shorts in bed,progressing to Min A to pull fully up over hips in standing today w/fww.  Mod A supine to sit EOB. Pt able to sit EOB unsupported 5 min  Prior to supine rest break.CGA x2 STS w/fww, CGAx1 and 2nd person SBA w/fww short distance ambulation from EOB to chair.   Cont per POC.       Other Barriers to Discharge (DME, Family Training, etc): Family training, consider AE for bathroom. Pt will be on the main floor at [facility]'s home for kitching/living/bathroom but her bed is upstairs.  [facility] has tub/shower upstairs, toilet on the main floor and walk in shower on the lower level.  Pt is used to a high toilet at her own house and a walk in shower with grab bars.

## 2021-05-21 NOTE — PLAN OF CARE
Alert and oriented x 4, but having difficulty of word finding. Fair appetite and fluid intake. Voided good using purewick and had a BM. Denied having pain and discomfort. Able to turn form side to side. Use call light approprietly, able to make her needs known.      Patient's most recent vital signs are:     Vital signs:  BP: 128/80  Temp: 96.5  HR: 113  RR: 18  SpO2: 95 %     Patient doesn't have new respiratory symptoms.  Patient doesn't  have new sore throat.  Patient doesn't have a fever greater than 99.5.

## 2021-05-22 ENCOUNTER — APPOINTMENT (OUTPATIENT)
Dept: OCCUPATIONAL THERAPY | Facility: SKILLED NURSING FACILITY | Age: 76
End: 2021-05-22
Payer: COMMERCIAL

## 2021-05-22 ENCOUNTER — APPOINTMENT (OUTPATIENT)
Dept: PHYSICAL THERAPY | Facility: SKILLED NURSING FACILITY | Age: 76
End: 2021-05-22
Payer: COMMERCIAL

## 2021-05-22 LAB
GLUCOSE BLDC GLUCOMTR-MCNC: 141 MG/DL (ref 70–99)
GLUCOSE BLDC GLUCOMTR-MCNC: 92 MG/DL (ref 70–99)
GLUCOSE BLDC GLUCOMTR-MCNC: 95 MG/DL (ref 70–99)
GLUCOSE BLDC GLUCOMTR-MCNC: 99 MG/DL (ref 70–99)
POTASSIUM SERPL-SCNC: 4.6 MMOL/L (ref 3.4–5.3)

## 2021-05-22 PROCEDURE — 82962 GLUCOSE BLOOD TEST: CPT | Mod: 91

## 2021-05-22 PROCEDURE — 97535 SELF CARE MNGMENT TRAINING: CPT | Mod: GO

## 2021-05-22 PROCEDURE — 120N000009 HC R&B SNF

## 2021-05-22 PROCEDURE — 250N000013 HC RX MED GY IP 250 OP 250 PS 637: Performed by: INTERNAL MEDICINE

## 2021-05-22 PROCEDURE — 97110 THERAPEUTIC EXERCISES: CPT | Mod: GP

## 2021-05-22 PROCEDURE — 97530 THERAPEUTIC ACTIVITIES: CPT | Mod: GP

## 2021-05-22 PROCEDURE — 97116 GAIT TRAINING THERAPY: CPT | Mod: GP

## 2021-05-22 PROCEDURE — 99308 SBSQ NF CARE LOW MDM 20: CPT | Performed by: INTERNAL MEDICINE

## 2021-05-22 PROCEDURE — 36415 COLL VENOUS BLD VENIPUNCTURE: CPT | Performed by: INTERNAL MEDICINE

## 2021-05-22 PROCEDURE — 250N000011 HC RX IP 250 OP 636: Performed by: INTERNAL MEDICINE

## 2021-05-22 PROCEDURE — 022N000001 HC SNF RUG CODE OPNP

## 2021-05-22 PROCEDURE — 250N000012 HC RX MED GY IP 250 OP 636 PS 637: Performed by: INTERNAL MEDICINE

## 2021-05-22 PROCEDURE — 84132 ASSAY OF SERUM POTASSIUM: CPT | Performed by: INTERNAL MEDICINE

## 2021-05-22 RX ADMIN — AMLODIPINE BESYLATE 5 MG: 5 TABLET ORAL at 08:30

## 2021-05-22 RX ADMIN — LEVETIRACETAM 750 MG: 750 TABLET, FILM COATED ORAL at 21:02

## 2021-05-22 RX ADMIN — LINACLOTIDE 290 MCG: 145 CAPSULE, GELATIN COATED ORAL at 08:29

## 2021-05-22 RX ADMIN — SULFAMETHOXAZOLE AND TRIMETHOPRIM 1 TABLET: 400; 80 TABLET ORAL at 08:30

## 2021-05-22 RX ADMIN — FLUOXETINE 60 MG: 20 CAPSULE ORAL at 08:30

## 2021-05-22 RX ADMIN — QUETIAPINE FUMARATE 50 MG: 25 TABLET ORAL at 21:02

## 2021-05-22 RX ADMIN — LEVETIRACETAM 750 MG: 750 TABLET, FILM COATED ORAL at 08:29

## 2021-05-22 RX ADMIN — PANTOPRAZOLE SODIUM 40 MG: 40 TABLET, DELAYED RELEASE ORAL at 06:37

## 2021-05-22 RX ADMIN — PANTOPRAZOLE SODIUM 40 MG: 40 TABLET, DELAYED RELEASE ORAL at 16:44

## 2021-05-22 RX ADMIN — Medication 2.5 MG: at 16:44

## 2021-05-22 RX ADMIN — LORAZEPAM 0.5 MG: 0.5 TABLET ORAL at 09:32

## 2021-05-22 RX ADMIN — QUETIAPINE FUMARATE 25 MG: 25 TABLET ORAL at 08:30

## 2021-05-22 RX ADMIN — ENOXAPARIN SODIUM 40 MG: 40 INJECTION SUBCUTANEOUS at 16:44

## 2021-05-22 RX ADMIN — DOXEPIN HYDROCHLORIDE 3 MG: 10 SOLUTION ORAL at 21:03

## 2021-05-22 RX ADMIN — BUSPIRONE HYDROCHLORIDE 30 MG: 10 TABLET ORAL at 08:29

## 2021-05-22 RX ADMIN — FUROSEMIDE 20 MG: 20 TABLET ORAL at 08:29

## 2021-05-22 RX ADMIN — CALCIUM POLYCARBOPHIL 625 MG: 625 TABLET, FILM COATED ORAL at 08:29

## 2021-05-22 RX ADMIN — QUETIAPINE FUMARATE 25 MG: 25 TABLET ORAL at 16:44

## 2021-05-22 RX ADMIN — BUSPIRONE HYDROCHLORIDE 30 MG: 10 TABLET ORAL at 21:03

## 2021-05-22 RX ADMIN — DEXAMETHASONE 1 MG: 1 TABLET ORAL at 08:29

## 2021-05-22 NOTE — PROGRESS NOTES
[facility] PHYSICIANS  SPECIALIZING IN BREAKTHROUGHS  Radiation Oncology    On Treatment Visit Note      Olga Bailey      Date: May 20, 2021   MRN: [id number]   : 1945    Diagnosis: GBM    Reason for Visit:  On Radiation Treatment Visit     Treatment Summary to Date  Treatment Site: Brain Current Dose: 2670/4005 cGy Fractions: 10/15      Chemotherapy  Chemo concurrent with radx?: No    Subjective:  Ms. Bailey appears well today. She presents in a wheelchair from the TCU. She has been more active at the TCU.      Treatment Breaks: Zac machine down on Wednesday, not treated on Wednesday   ED visits / Hospitalizations: None    Objective:     General: Appears well, in wheelchair   Cardio: Well perfused   Resp: Breathing comfortably      Labs:    Last CBC with Diff  WBC   Date Value Ref Range Status   2021 7.6 4.0 - 11.0 10e9/L Final     RBC Count   Date Value Ref Range Status   2021 3.45 (L) 3.8 - 5.2 10e12/L Final     Hemoglobin   Date Value Ref Range Status   2021 10.5 (L) 11.7 - 15.7 g/dL Final     Hematocrit   Date Value Ref Range Status   2021 32.9 (L) 35.0 - 47.0 % Final     MCV   Date Value Ref Range Status   2021 95 78 - 100 fl Final     MCH   Date Value Ref Range Status   2021 30.4 26.5 - 33.0 pg Final     MCHC   Date Value Ref Range Status   2021 31.9 31.5 - 36.5 g/dL Final     RDW   Date Value Ref Range Status   2021 19.4 (H) 10.0 - 15.0 % Final     Platelet Count   Date Value Ref Range Status   2021 188 150 - 450 10e9/L Final     Diff Method   Date Value Ref Range Status   2021 Manual Differential  Final     % Neutrophils   Date Value Ref Range Status   2021 92.0 % Final     % Lymphocytes   Date Value Ref Range Status   2021 2.0 % Final     % Monocytes   Date Value Ref Range Status   2021 2.0 % Final     % Eosinophils   Date Value Ref Range Status   2021 0.0 % Final     % Basophils   Date Value Ref  Range Status   04/22/2021 0.0 % Final     Absolute Neutrophil   Date Value Ref Range Status   04/22/2021 14.4 (H) 1.6 - 8.3 10e9/L Final     Absolute Lymphocytes   Date Value Ref Range Status   04/22/2021 0.3 (L) 0.8 - 5.3 10e9/L Final     Absolute Monocytes   Date Value Ref Range Status   04/22/2021 0.3 0.0 - 1.3 10e9/L Final        Last Comprehensive Metabolic Panel:  Sodium   Date Value Ref Range Status   05/21/2021 141 133 - 144 mmol/L Final     Potassium   Date Value Ref Range Status   05/21/2021 3.0 (L) 3.4 - 5.3 mmol/L Final     Chloride   Date Value Ref Range Status   05/21/2021 108 94 - 109 mmol/L Final     Carbon Dioxide   Date Value Ref Range Status   05/21/2021 27 20 - 32 mmol/L Final     Anion Gap   Date Value Ref Range Status   05/21/2021 6 3 - 14 mmol/L Final     Glucose   Date Value Ref Range Status   05/21/2021 94 70 - 99 mg/dL Final     Urea Nitrogen   Date Value Ref Range Status   05/21/2021 18 7 - 30 mg/dL Final     Creatinine   Date Value Ref Range Status   05/21/2021 0.50 (L) 0.52 - 1.04 mg/dL Final     GFR Estimate   Date Value Ref Range Status   05/21/2021 >90 >60 mL/min/[1.73_m2] Final     Comment:     Non  GFR Calc  Starting 12/18/2018, serum creatinine based estimated GFR (eGFR) will be   calculated using the Chronic Kidney Disease Epidemiology Collaboration   (CKD-EPI) equation.       Calcium   Date Value Ref Range Status   05/21/2021 8.5 8.5 - 10.1 mg/dL Final        Assessment:    Tolerating radiation therapy well.  All questions and concerns addressed.    Toxicities:    Plan:   1. Continue current therapy.    2. Steroid taper as tolerated   3. Recommend administration of second COVID vaccine when able.     Mosaiq chart and setup information reviewed  IGRT images checked    Medication Review    Educational Topic Discussed  Additional Instructions: reviewed follow up plan     Betsy Spann MD, PhD     Department of Radiation Oncology  [facility]  [facility]

## 2021-05-22 NOTE — PLAN OF CARE
Patient's most recent vital signs are:     Vital signs:  BP: 135/87  Temp: 96  HR: 96  RR: 16  SpO2: 97 %     Patient does not have new respiratory symptoms.  Patient does not have new sore throat.  Patient does not have a fever greater than 99.5.       VS: See above   O2: none   Output: shannonck   Last BM: 5/21/2021   Activity: Up in chair,2 to transfter   Skin: Intact, has bruises on L/E   Pain: denies   CMS: intact   Dressing: None    Diet: dysphagia diet,nectar thickened liquids    LDA: none   Equipment: W/c,lift prn   Plan: Continue with therapies, encourage patient to sit up for meals   Additional Info: Patient had a panic attack this am, given prn ativan with relief. Patient is losing her hair and is having difficulty with it. Patient wanted to save some of her hair. I put some of her hair in a zip lock baggie for her

## 2021-05-22 NOTE — PLAN OF CARE
"Discharge Planner Post-Acute Rehab OT:      Discharge Plan: discharge to [facility]'s home, pt will need to be home alone intermittently, home health and home OT     Precautions: weakness RUE, poor midline sitting unsupported, falls     Current Status:  ADLs: Pt requires set-up for gr/hyg and oral cares, min A UB dressing supported seated in bed, min A LB dressing, mod A supine to sit w/ HOB raised and use of side rail, CGA x2 STS w/fww on 5/21, Max A bathing in rolling shower chair, pt able to participate in hair washing, UB sponge bathing, and cleansing brad area  IADLs: Pt had been IND with IADLs PTA.  Vision/Cognition: Pt wears glasses and notes a change in her cognition.  She is partially oriented and follows directions.      Assessment:   initiated LB dressing training from chair. Pt having some difficulty, therapist removed tread socks to increase ease of slip with shorts. Pt able to thread RLE into shorts w/effort. Once RLE in shorts, pt appearing less responsive conversationally and distracted. When therapist checked in with her pt stated \"I don't know why it seems so much harder today\". Therapist provided reassurance and encouragement, pt explained further \" it feels like anxiety\".  Pt requested to have BLE elevated and to lie down. Therapist called for second assist, and checked vitals 95% 02 on RA, hr 117. bp 120/83. Therapist instructed pt in breathing for relaxation while waiting for second person assist. Also called RN per pt request for PRN anxiety med. Once second person arrived, pt requested to use lift to bed. Completed liko lift transfer, updated pts RN.    Other Barriers to Discharge (DME, Family Training, etc): Family training, consider AE for bathroom. Pt will be on the main floor at [facility]'s home for kitching/living/bathroom but her bed is upstairs.  [facility] has tub/shower upstairs, toilet on the main floor and walk in shower on the lower level.  Pt is used to a high toilet at her own house and a walk in " shower with grab bars.

## 2021-05-22 NOTE — PLAN OF CARE
Lethargic/alert and oriented. VSS. Denies pain, no SOB. Noted mild BUE tremors. BG readings 155, 110. Declined to get out of bed for meal but sat upright in bed w/ frequent check-ins. Appetite good, adequate fluid intake. Had a shower, she noted losing her first chunk of hair since starting radiation. No skin concerns ex abdominal bruising. Voiding via purewick, changed at 2100. LBM 5/21. Declined to wear BiPAP at bedtime.    Patient's most recent vital signs are:     Vital signs:  BP: 118/78  Temp: 97.4  HR: 92  RR: 18  SpO2: 97 %     Patient does not have new respiratory symptoms.  Patient does not have new sore throat.  Patient does not have a fever greater than 99.5.

## 2021-05-22 NOTE — PLAN OF CARE
Discharge Planner Post-Acute Rehab PT:     Discharge Plan: Discharge to [facility] home with home PT at [facility] with fww    Precautions: Fall precaution    Current Status:  Bed Mobility: Needs min A for getting to/from edge of bed  Transfer: Gerard stand pivot using ww (nursing to use lift still or A x2); min/modA sit<>stand from EOB using ww, mod/maxA from lower surfaces  Gait: 23 ft x2, min A, w/c follow using ww  Stairs: Not tested at this time due to fatigue and weakness  Balance: Pt has fair sitting balance at edge of bed, but declines with fatigue.  Pt has fair standing balance with UE support.    Assessment: PTA: Pt enjoyed nustep activity, would like to use this as much as possible. Pt able to progress amb distance using ww, close w/c follow 23 ft x2 with seated rest after each trial (CGA/Gerard)    Other Barriers to Discharge (DME, Family Training, etc): Stairs, family training

## 2021-05-22 NOTE — PROGRESS NOTES
Patient was seen, case reviewed with nursing staff and with patient's daughter.    Patient feels well overall.  She is feeling stronger and is slowly progressing in therapy.  She is tolerating dysphagia 2 diet with nectar liquids after recent video swallow revealed silent aspiration with thin liquids.  She has 3 more radiation treatments    Bowel movements are formed, regular.  Patient denies nausea, vomiting, abdominal pain, rectal pain.  She denies cough, chest pain, shortness of breath    Vital signs stable  Afebrile  Alert, fully oriented, in good spirits  Lungs clear except faint crackles right base  Respiratory 12 unlabored  CV regular rhythm  Abdomen soft, nontender, nondistended  No lower extremity edema      Assessment/plan    Olga Bailey is a 75 y.o. F w/ GBM s/p resection (Feb 2021), depression/anxiety, HTN, asthma, and GERD, who was transferred to [facility] for consideration of inpt rad/onc treatment while she continues to be treated for complex presentation of acute hypoxic respiratory failure (due to possible PJP pneumonia and/or TRALI), 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 Stroke code called on 5/11, suspect seizure and being in post-ictal state.  Transferred to TCU 5/15/2021 for ongoing rehab needs          #Acute hypoxic respiratory failure   #Bilateral Pneumonia; presumed PJP s/p ABx treatment  #Concern for transfusion-related acute lung injury (TRALI)  #Concern for R hemidiaphragm paralysis, possible volume overload  Resp status stable  Pt remains on single strength  Bactrim daily until 1.5 month following end of steroids. She remains on low dose lasix (20 mg daily) for possible component of volume overload  Plan continue current tx, monitor BMP    Dysphagia,  Unclear if related to generalized weakness  Plan DD2 diet, nectar thick liquids  Speech Path is following     Glioblastoma Multiforme  Left frontoparietal brain glioblastoma status post resection  2/23/2021. Seen by radiation oncology 3/24 recommended XRT and chemo radiation. However subsequently admitted for resp failure. MRI brain here signs of possible disease recurrence. Hem/onc concerned that she might not be a candidate for chemotherapy or radiation currently due to poor performance status.  - Radiation course initiated as inpatient 5/4 for 15 sessions  -Heme/onc consulted,  not a candidate for chemo due to poor functional status   would consider temozolomide at late date pending clinical course (needs to improve performance status)  - Levetiracetam 750mg BID  - Dexamethasone course completed today    #Acute Encephalopathy, Resolved  #Seizure, Resolved  Patient developed sudden change in mental status on 5/11 where there was decreased responsiveness, increased droop in right eye. Patient was seen 1 hr prior to event, no trauma. Concern immediately for stroke versus seizure. Stroke ode called after rapid response. CTA negative for acute bleed or vascular stenosis/obstruction. Neurology suspected post-ictal state following seizure. After increase dosage of levetiracetam, no additional seizure activity has been observed.  Mental status has been stable  - Follow up with Dr. Baker   - Levetiracetam 750 mg BID     #Major depressive disorder, recurrent  #Anxiety  Follows with psychiatry and psychology as outpatint.  Saw health psychology during admission for GBM, made plan for outpatient follow-up and med adjustment which she was unable to complete. While inpatient, she met with health psychology and they recommended to continue health psychology while at [facility].  - Seroquel 25 mg BID + 50 mg at bedtime (see Dr Madison from psychiatry note)  - Ativan 0.5 mg q4h PRN  - Scheduled doxepin PRN at night for sleep  - c/t PTA Buspar  - PTA prozac (dose reduced from 80 to 60 mg)    #Bilateral lower extremity DVT  #Right retroperitoneal hematoma   On 3/29 at outside hospital patient's O2 needs increased, duplex ultrasound  "revealed bilateral lower extremity DVT. CT PE negative for embolism.  Treated with IV heparin and transitioned to Lovenox 70 mg.  On 4/14 this was complicated by retroperitoneal bleed, requiring 4 units of packed red blood cells.  Lovenox was held, and IVC filter was placed on 4/16. Vascular surgery was involved, and a CT abdomen was stable bleed so no surgical intervention was required.  Eventually resumed on prophylactic 40mg of Lovenox twice daily.  -Continue 40 mg Lovenox qday  LLE U/S 5/8--neg for acute DVT     #Ileus, resolved  Began after bleed at outside hospital 4/14; was on TPN for nutrition.    Bowel movements are now regular on current regimen of prune juice and Fibercon  Plan monitor bowel status      #Hypertension  Stable on amlodipine  Plan monitor BP     #Steroid-induced hyperglycemia   Intermittently requiring insulin while on prednisone and on dexamethasone  BG nl   Plan No further monitoring        Deconditioning secondary to protracted acute illness   Patient is slowly progressing in therapy  Plan: Continue therapies.  Anticipate discharge to daughter's home    History of MARCELLE  On Bipap at home  Pt has been refusing use at night,  \"I dont think I need it\"  Plan monitor 02 sats at night  "

## 2021-05-22 NOTE — PLAN OF CARE
Alert and oriented. No new concerns overnight. Sleeping well in between cares.  Pure wick in place and with good output, lbm 05/21. Remains on DD2 with nectar thickened liquids. Calls appropriately for assistance. Continue POC.       Patient's most recent vital signs are:     Vital signs:  BP: 118/78  Temp: 97.4  HR: 92  RR: 18  SpO2: 97 %     Patient does not have new respiratory symptoms.  Patient does not have new sore throat.  Patient does not have a fever greater than 99.5.

## 2021-05-23 LAB — GLUCOSE BLDC GLUCOMTR-MCNC: 93 MG/DL (ref 70–99)

## 2021-05-23 PROCEDURE — 250N000011 HC RX IP 250 OP 636: Performed by: INTERNAL MEDICINE

## 2021-05-23 PROCEDURE — 250N000013 HC RX MED GY IP 250 OP 250 PS 637: Performed by: INTERNAL MEDICINE

## 2021-05-23 PROCEDURE — 82962 GLUCOSE BLOOD TEST: CPT

## 2021-05-23 PROCEDURE — 120N000009 HC R&B SNF

## 2021-05-23 RX ADMIN — SULFAMETHOXAZOLE AND TRIMETHOPRIM 1 TABLET: 400; 80 TABLET ORAL at 09:05

## 2021-05-23 RX ADMIN — ENOXAPARIN SODIUM 40 MG: 40 INJECTION SUBCUTANEOUS at 16:51

## 2021-05-23 RX ADMIN — Medication 2.5 MG: at 17:25

## 2021-05-23 RX ADMIN — LEVETIRACETAM 750 MG: 750 TABLET, FILM COATED ORAL at 21:22

## 2021-05-23 RX ADMIN — BUSPIRONE HYDROCHLORIDE 30 MG: 10 TABLET ORAL at 21:22

## 2021-05-23 RX ADMIN — BUSPIRONE HYDROCHLORIDE 30 MG: 10 TABLET ORAL at 09:05

## 2021-05-23 RX ADMIN — PANTOPRAZOLE SODIUM 40 MG: 40 TABLET, DELAYED RELEASE ORAL at 06:34

## 2021-05-23 RX ADMIN — FUROSEMIDE 20 MG: 20 TABLET ORAL at 09:05

## 2021-05-23 RX ADMIN — PANTOPRAZOLE SODIUM 40 MG: 40 TABLET, DELAYED RELEASE ORAL at 16:51

## 2021-05-23 RX ADMIN — QUETIAPINE FUMARATE 50 MG: 25 TABLET ORAL at 21:22

## 2021-05-23 RX ADMIN — QUETIAPINE FUMARATE 25 MG: 25 TABLET ORAL at 16:51

## 2021-05-23 RX ADMIN — QUETIAPINE FUMARATE 25 MG: 25 TABLET ORAL at 09:06

## 2021-05-23 RX ADMIN — LEVETIRACETAM 750 MG: 750 TABLET, FILM COATED ORAL at 09:06

## 2021-05-23 RX ADMIN — CALCIUM POLYCARBOPHIL 625 MG: 625 TABLET, FILM COATED ORAL at 09:05

## 2021-05-23 RX ADMIN — DOXEPIN HYDROCHLORIDE 3 MG: 10 SOLUTION ORAL at 21:22

## 2021-05-23 RX ADMIN — FLUOXETINE 60 MG: 20 CAPSULE ORAL at 09:06

## 2021-05-23 NOTE — PLAN OF CARE
Alert and oriented, VSS. Reported frustration at start of shift d/t requesting to transfer from chair to bed and having to wait. Emotional support given and needs anticipated. Reported back pain, PRN oxycodone effective. No SOB. Appetite good. Encouraged fluid intake. No skin concerns noted. Monitored voiding, using purewick. Incontinent of BM this shift. Declined BiPAP at bedtime, plan to spot check SpO2 overnight.     Patient's most recent vital signs are:     Vital signs:  BP: 135/87  Temp: 96  HR: 96  RR: 16  SpO2: 97 %     Patient does not have new respiratory symptoms.  Patient does not have new sore throat.  Patient does not have a fever greater than 99.5.

## 2021-05-23 NOTE — PLAN OF CARE
Patient slept well and calm overnight. Denied any pain during cares. No SOB. Purewick in place and with adequate output. Lbm 05/22. Pills whole with nectar thickened  liquids. Call light in reach. Continue with POC.       Patient's most recent vital signs are:     Vital signs:  BP: 135/87  Temp: 96  HR: 96  RR: 16  SpO2: 93 %     Patient does not have new respiratory symptoms.  Patient does not have new sore throat.  Patient does not have a fever greater than 99.5.

## 2021-05-23 NOTE — PLAN OF CARE
VS: See below   O2: none   Output: Bedpan and purwick   Last BM: 5/23/2021   Activity: Transfers with two to chair   Skin: Intact,bruises   Pain: denies   CMS: intact   Dressing: none   Diet: Dysphagia diet altered nectar thickened liquids   LDA: none   Equipment: [place], w/c   Plan: Patient getting radiation M-F    Additional Info: Patient has radiation Monday at 1400 at [facility]           Patient's most recent vital signs are:     Vital signs:  BP: 113/80  Temp: 97.1  HR: 94  RR: 16  SpO2: 94 %     Patient does not have new respiratory symptoms.  Patient does not have new sore throat.  Patient does not have a fever greater than 99.5.

## 2021-05-24 ENCOUNTER — APPOINTMENT (OUTPATIENT)
Dept: RADIATION ONCOLOGY | Facility: CLINIC | Age: 76
End: 2021-05-24
Attending: STUDENT IN AN ORGANIZED HEALTH CARE EDUCATION/TRAINING PROGRAM
Payer: MEDICARE

## 2021-05-24 LAB
ANION GAP SERPL CALCULATED.3IONS-SCNC: 10 MMOL/L (ref 3–14)
BUN SERPL-MCNC: 18 MG/DL (ref 7–30)
CALCIUM SERPL-MCNC: 8.6 MG/DL (ref 8.5–10.1)
CHLORIDE SERPL-SCNC: 107 MMOL/L (ref 94–109)
CO2 SERPL-SCNC: 23 MMOL/L (ref 20–32)
CREAT SERPL-MCNC: 0.54 MG/DL (ref 0.52–1.04)
GFR SERPL CREATININE-BSD FRML MDRD: >90 ML/MIN/{1.73_M2}
GLUCOSE SERPL-MCNC: 89 MG/DL (ref 70–99)
HGB BLD-MCNC: 10.7 G/DL (ref 11.7–15.7)
LABORATORY COMMENT REPORT: NORMAL
PLATELET # BLD AUTO: 169 10E9/L (ref 150–450)
POTASSIUM SERPL-SCNC: 3.8 MMOL/L (ref 3.4–5.3)
SARS-COV-2 RNA RESP QL NAA+PROBE: NEGATIVE
SODIUM SERPL-SCNC: 140 MMOL/L (ref 133–144)
SPECIMEN SOURCE: NORMAL

## 2021-05-24 PROCEDURE — 97116 GAIT TRAINING THERAPY: CPT | Mod: GP

## 2021-05-24 PROCEDURE — 85049 AUTOMATED PLATELET COUNT: CPT | Performed by: INTERNAL MEDICINE

## 2021-05-24 PROCEDURE — 250N000013 HC RX MED GY IP 250 OP 250 PS 637: Performed by: INTERNAL MEDICINE

## 2021-05-24 PROCEDURE — 85018 HEMOGLOBIN: CPT | Performed by: INTERNAL MEDICINE

## 2021-05-24 PROCEDURE — 97110 THERAPEUTIC EXERCISES: CPT | Mod: GO

## 2021-05-24 PROCEDURE — 92507 TX SP LANG VOICE COMM INDIV: CPT | Mod: GN

## 2021-05-24 PROCEDURE — 250N000011 HC RX IP 250 OP 636: Performed by: INTERNAL MEDICINE

## 2021-05-24 PROCEDURE — 97110 THERAPEUTIC EXERCISES: CPT | Mod: GP

## 2021-05-24 PROCEDURE — 97129 THER IVNTJ 1ST 15 MIN: CPT | Mod: GO

## 2021-05-24 PROCEDURE — 77386 HC IMRT TREATMENT DELIVERY, COMPLEX: CPT | Performed by: STUDENT IN AN ORGANIZED HEALTH CARE EDUCATION/TRAINING PROGRAM

## 2021-05-24 PROCEDURE — 92526 ORAL FUNCTION THERAPY: CPT | Mod: GN | Performed by: SPEECH-LANGUAGE PATHOLOGIST

## 2021-05-24 PROCEDURE — 120N000009 HC R&B SNF

## 2021-05-24 PROCEDURE — 80048 BASIC METABOLIC PNL TOTAL CA: CPT | Performed by: INTERNAL MEDICINE

## 2021-05-24 PROCEDURE — 36415 COLL VENOUS BLD VENIPUNCTURE: CPT | Performed by: INTERNAL MEDICINE

## 2021-05-24 PROCEDURE — 97530 THERAPEUTIC ACTIVITIES: CPT | Mod: GP

## 2021-05-24 PROCEDURE — 87635 SARS-COV-2 COVID-19 AMP PRB: CPT | Performed by: INTERNAL MEDICINE

## 2021-05-24 RX ORDER — POTASSIUM CHLORIDE 750 MG/1
20 TABLET, EXTENDED RELEASE ORAL DAILY
Status: DISCONTINUED | OUTPATIENT
Start: 2021-05-24 | End: 2021-05-26 | Stop reason: HOSPADM

## 2021-05-24 RX ADMIN — QUETIAPINE FUMARATE 25 MG: 25 TABLET ORAL at 16:46

## 2021-05-24 RX ADMIN — CALCIUM POLYCARBOPHIL 625 MG: 625 TABLET, FILM COATED ORAL at 10:01

## 2021-05-24 RX ADMIN — QUETIAPINE FUMARATE 50 MG: 25 TABLET ORAL at 21:51

## 2021-05-24 RX ADMIN — BUSPIRONE HYDROCHLORIDE 30 MG: 10 TABLET ORAL at 10:00

## 2021-05-24 RX ADMIN — PANTOPRAZOLE SODIUM 40 MG: 40 TABLET, DELAYED RELEASE ORAL at 16:46

## 2021-05-24 RX ADMIN — LEVETIRACETAM 750 MG: 750 TABLET, FILM COATED ORAL at 21:51

## 2021-05-24 RX ADMIN — POTASSIUM CHLORIDE 20 MEQ: 750 TABLET, EXTENDED RELEASE ORAL at 14:34

## 2021-05-24 RX ADMIN — FUROSEMIDE 20 MG: 20 TABLET ORAL at 10:00

## 2021-05-24 RX ADMIN — BUSPIRONE HYDROCHLORIDE 30 MG: 10 TABLET ORAL at 22:29

## 2021-05-24 RX ADMIN — SULFAMETHOXAZOLE AND TRIMETHOPRIM 1 TABLET: 400; 80 TABLET ORAL at 10:01

## 2021-05-24 RX ADMIN — PANTOPRAZOLE SODIUM 40 MG: 40 TABLET, DELAYED RELEASE ORAL at 06:50

## 2021-05-24 RX ADMIN — QUETIAPINE FUMARATE 25 MG: 25 TABLET ORAL at 10:01

## 2021-05-24 RX ADMIN — LINACLOTIDE 290 MCG: 145 CAPSULE, GELATIN COATED ORAL at 10:01

## 2021-05-24 RX ADMIN — ENOXAPARIN SODIUM 40 MG: 40 INJECTION SUBCUTANEOUS at 16:46

## 2021-05-24 RX ADMIN — FLUOXETINE 60 MG: 20 CAPSULE ORAL at 10:00

## 2021-05-24 RX ADMIN — LEVETIRACETAM 750 MG: 750 TABLET, FILM COATED ORAL at 10:01

## 2021-05-24 RX ADMIN — DOXEPIN HYDROCHLORIDE 3 MG: 10 SOLUTION ORAL at 21:51

## 2021-05-24 NOTE — PATIENT INSTRUCTIONS
Congrats on nearing completion of radiation.   Repeat imaging in 4 weeks.   Start chemotherapy at that time.     Optune to be considered.     Continue Keppra and Lovenox.    Return to clinic in 4 weeks + imaging + labs.     Stephanie Baker MD  Neuro-oncology

## 2021-05-24 NOTE — PLAN OF CARE
Alert and oriented x4. Very pleasant during encounters. Patient denied any pain. No SOB or acute concerns. Slept well in between cares. Adequate urine output for the shift via purewick. No bm overnight. Called appropriately for assistance. Continue POC.       Patient's most recent vital signs are:     Vital signs:  BP: 114/65  Temp: 98.8  HR: 92  RR: 18  SpO2: 92 %     Patient does not have new respiratory symptoms.  Patient does not have new sore throat.  Patient does not have a fever greater than 99.5.

## 2021-05-24 NOTE — PLAN OF CARE
Alert and oriented, VSS. Reported back pain from sitting in the chair; requested PRN oxycodone and to transfer back to bed, effective. No SOB. Appetite good, promoted fluid intake. Incontinent / urgent BM; some blanchable redness noted to inner buttock, barrier cream applied. Voiding via purewick. Declines BiPAP but O2 spot checked after falling asleep, > 90% on RA.     Patient's most recent vital signs are:     Vital signs:  BP: 114/65  Temp: 98.8  HR: 92  RR: 18  SpO2: 92 %     Patient does not have new respiratory symptoms.  Patient does not have new sore throat.  Patient does not have a fever greater than 99.5.

## 2021-05-24 NOTE — PLAN OF CARE
"Discharge Planner Post-Acute Rehab OT:      Discharge Plan: discharge to [facility]'s home, pt will need to be home alone intermittently, home health and home OT     Precautions: weakness RUE, poor midline sitting unsupported, falls     Current Status:  ADLs: Pt requires set-up for gr/hyg and oral cares, min A UB dressing supported seated in bed, min A LB dressing, mod A supine to sit w/ HOB raised and use of side rail, CGA x2 STS w/fww on 5/21, Max A bathing in rolling shower chair, pt able to participate in hair washing, UB sponge bathing, and cleansing brad area  IADLs: Pt had been IND with IADLs PTA.  Vision/Cognition: Pt wears glasses and notes a change in her cognition.  She is partially oriented and follows directions. On 5/24 OT: impaired memory, impaired attn/concentration and decreased problem sovling w/ limited awareness of deficits when questioned about things that are difficult,. increased awareness at end of session after pt /th discussed performance and pt's own observation of \"this is harder than I thought\" , sorted money by type of coin w/ min A for sorting and attn, indep stated value of each coin w/ 100% accur but max assist to do simple counting total value of multiple coins in a pile.      Assessment:   pt chose to focus therapy on transfers, Rue function and cognition today, basic SPT w/min A and no assist device, min A for sit to supine transfers, impaired memory/attn/concentration, cont w/ OT PO    Other Barriers to Discharge (DME, Family Training, etc): Family training, consider AE for bathroom. Pt will be on the main floor at [facility]'s home for kitching/living/bathroom but her bed is upstairs.  [facility] has tub/shower upstairs, toilet on the main floor and walk in shower on the lower level.  Pt is used to a high toilet at her own house and a walk in shower with grab bars.  "

## 2021-05-24 NOTE — PLAN OF CARE
Patient's most recent vital signs are:     Vital signs:  BP: 118/76  Temp: 96.6  HR: 100  RR: 16  SpO2: 94 %     Patient does not have new respiratory symptoms.  Patient does not have new sore throat.  Patient does not have a fever greater than 99.5.    Alert and verbally makes her needs known. Continues on DD2 with nectar thick liquids. Seen by SLP this morning for swallow and cognition. Up in chair most of the day. Radiation at 1400. Participating in therapies.

## 2021-05-24 NOTE — PLAN OF CARE
Discharge Planner Post-Acute Rehab SLP:      Discharge Plan: home with assist , further SLP pending progress     Precautions:fall, swallowing     Current Status:  Communication: Moderate- higher level language deficits.  Cognition: Moderate cognitive deficits - attention, memory, reasoning/problem solving.  Swallow: NDD2 diet, nectar fluids (textures advanced 5/19/2021). VFSS 4/28 and 5/21/21Noting silent aspiration during/after with thin fluid by cup, recommend continue nectar fluids, NDD2 diet, sit up for po, small bites and sips slow pace, Monitor for sx aspiration (note pt silent aspiration on thin).       Assessment:SLP:SLP: pt seen for swallowing at meal, tried small amount NDD3 item, as welL as current NDD2 diet, ok with , pt with nectar, ok straw, but advising small sips, bites, sit up for po. will continue to trial NDD3, pt with silent aspiration thin on recent repeat VFSS 5/21/21. Also working on oropharyngeal exercises, cognitive/language goals       .    Other Barriers to Discharge (Family Training, etc): TBD            Revision History

## 2021-05-24 NOTE — PROGRESS NOTES
NEURO-ONCOLOGY VISIT  May 25, 2021    CHIEF COMPLAINT: Ms. Olga Bailey is a 75 year old right-handed woman with a left frontoparietal glioblastoma (IDH wild-type, MGMT promoter methylated), diagnosed following gross total resection on 2/23/2021. Initiation of upfront cancer-directed treatment was delayed due to a complicated hospitalization. Given a resolving infection and lowered functional status, radiation alone was initiated on 5/4/2021 and will be completed on 5/27/2021.     Accompanying her to this visit is Rita (daughter).     HISTORY OF PRESENT ILLNESS  -Olga is currently residing in rehab and clinically much improved as compared to a few weeks ago. Participating well with therapies. Needs assistance in standing, can take steps with assistance/ parallel bars. No longer needing a lift.   -Tolerating radiation well, however, more fatigued.   -Mental status improving, though more slowed/ mild confusion. She continues to note only mild aphasia. Working with ST.  -Infrequent headaches. On oxycodone for back pain.   -Denies changes in sensation.  -Off dexamethasone.   -Noted to have a clot; on anticoagulation.   -On Keppra, no recurrent seizures.      REVIEW OF SYSTEMS  A comprehensive ROS negative except as in HPI.      MEDICATIONS   No current facility-administered medications for this visit.      No current outpatient medications on file.     Facility-Administered Medications Ordered in Other Visits   Medication     - Skin Test Reading -     acetaminophen (TYLENOL) tablet 650 mg     albuterol (PROAIR HFA/PROVENTIL HFA/VENTOLIN HFA) 108 (90 Base) MCG/ACT inhaler 2 puff     albuterol (PROVENTIL) neb solution 2.5 mg     amLODIPine (NORVASC) tablet 5 mg     bisacodyl (DULCOLAX) Suppository 10 mg     busPIRone (BUSPAR) tablet 30 mg     calcium carbonate (TUMS) chewable tablet 1,000 mg     calcium polycarbophil (FIBERCON) tablet 625 mg     carboxymethylcellulose PF (REFRESH PLUS) 0.5 % ophthalmic  solution 1 drop     glucose gel 15-30 g    Or     dextrose 50 % injection 25-50 mL    Or     glucagon injection 1 mg     doxepin (SINEquan) 10 MG/ML (HIGH CONC) solution 3 mg     enoxaparin ANTICOAGULANT (LOVENOX) injection 40 mg     eucerin cream     FLUoxetine (PROzac) capsule 60 mg     furosemide (LASIX) tablet 20 mg     levETIRAcetam (KEPPRA) tablet 750 mg     linaclotide (LINZESS) capsule 290 mcg     LORazepam (ATIVAN) tablet 0.5 mg     naloxone (NARCAN) injection 0.2 mg    Or     naloxone (NARCAN) injection 0.4 mg    Or     naloxone (NARCAN) injection 0.2 mg    Or     naloxone (NARCAN) injection 0.4 mg     Nurse may request from Pharmacy a change of form of medication (e.g. Liquid to tablet).     ondansetron (ZOFRAN-ODT) ODT tab 4 mg    Or     ondansetron (ZOFRAN) injection 4 mg     oxyCODONE IR (ROXICODONE) half-tab 2.5 mg     pantoprazole (PROTONIX) EC tablet 40 mg     polyethylene glycol (MIRALAX) powder 17 g     potassium chloride ER (KLOR-CON M) CR tablet 20 mEq     QUEtiapine (SEROquel) tablet 25 mg     QUEtiapine (SEROquel) tablet 50 mg     sennosides (SENOKOT) tablet 1 tablet     simethicone (MYLICON) suspension 40 mg     sulfamethoxazole-trimethoprim (BACTRIM) 400-80 MG per tablet 1 tablet     No current outpatient medications on file.     DRUG ALLERGIES   Allergies   Allergen Reactions     Bacitracin Rash     Bactroban is effective; No difficulties     Erythromycin      intolerant.     Meperidine Hcl      intolerant only   Demerol     Chloraprep One Step Rash     IMMUNIZATIONS   Immunization History   Administered Date(s) Administered     COVID-19,PF,Moderna 03/18/2021     Flu 65+ Years 09/28/2020     HepB 01/01/1990     Influenza (IIV3) PF 01/01/2001     Influenza, Quad, High Dose, Pf, 65yr + 09/28/2020     Pneumococcal 23 valent 07/22/2020     Tetanus 06/13/2004     Zoster vaccine recombinant adjuvanted (SHINGRIX) 12/24/2019, 10/12/2020       ONCOLOGIC HISTORY  -2/2021 PRESENTATION: Progressive  "aphasia.   -2/19/2021 MR brain imaging with 3.3 x 2.8 x 2.8 cm enhancing mass in left frontal-parietal region. A second contrast enhancing lesion (0.5 x 1.0 x 1.0 cm) in right occipital lobe which is dural based and largely stable in size since 9/2011; likely representing a meningioma.  -2/23/2021 SURGERY: Gross total resection by Dr. Cummings  PATHOLOGY: Glioblastoma; IDH1-R132H wild-type/ IDH 1 and 2 wildtype, MGMT promoter methylated. Not BRAF mutated.   -3/23/2021 NEURO-ONC: Recommending chemoradiotherapy.   -3/2021 ADMISSION: SOB and chest pain; CT PA negative, but bilateral DVT noted on U/S. Started on Lovenox. Acute hypoxic respiratory failure in the setting of bilateral pneumonia; presumed PJP, concern for transfusion-related acute lung injury and right hemidiaphragm paralysis. Seizure. Right retroperitoneal hematoma. Ileus. Non-severe malnutrition on TPN with concern for refeeding syndrome. 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 Shock Liver.  -5/4 - 5/27/2021 RADS: 15 fractions.   -5/25/2021 NEURO-ONC/ DEVICE: Discussed the role of Optune; to be considered. Repeat MRI in 4 weeks with plans to start adjuvant temozolomide.     SOCIAL HISTORY   History   Smoking Status     Never Smoker   Smokeless Tobacco     Never Used    Social History    Substance and Sexual Activity      Alcohol use: Yes        Comment: very rare     History   Drug Use No       PHYSICAL EXAMINATION  /80 (BP Location: Right arm, Patient Position: Sitting, Cuff Size: Adult Regular)   Pulse 127   Temp 98.7  F (37.1  C) (Oral)   Resp 16   SpO2 93%   Wt Readings from Last 2 Encounters:   05/19/21 69.9 kg (154 lb)   05/10/21 67.1 kg (147 lb 14.9 oz)      Ht Readings from Last 2 Encounters:   03/26/21 1.6 m (5' 3\")   03/23/21 1.6 m (5' 3\")     KPS: 60    -Generally well appearing. Fatigued.   -Respiratory: Mild shortness of breath, with mild wheezing.   -Skin: Facial rash on cheeks of face. Bruising on arm and abdomen (lovenox injections). "   -Psychiatric: Normal mood and affect. Pleasant, talkative.  -Neurologic:   MENTAL STATUS:     Alert, oriented to date (May, 23, 2021)    Recall: Intact.     Speech largely fluent; mild hesitation. No significant word-finding issues noted.    Comprehension intact to multi-step commands.   Good right-left orientation.     CRANIAL NERVES:     Pupils are equal, round.     Extraocular movements full, denies diplopia.     Visual fields full.     Facial sensation intact to light touch.   No right facial droop today, (??) nasolabial fold flattening.   Hearing slightly decreased bilaterally.   Normal phonation.   MOTOR: No pronation or drift. (Pronation at baseline on right; related to shoulder).   SENSATION: Intact to light touch throughout.  COORDINATION: Slight impairment on finger-nose with eyes open and closed on the right.  GAIT: Sitting in wheelchair, did not test.       MEDICAL RECORDS  Obtained and personally reviewed all available outside medical records in addition to reviewing any records available in our electronic system.     LABS  Personally reviewed all available lab results.     IMAGING  No new neuro-imaging to review.        IMPRESSION  Clinic time for this high complexity encounter was spent discussing in detail the nature of her cancer, providing emotional support, answering questions pertaining to my recommendations, and devising the plan as outlined below.     Clinically, I am pleased with how well Olga is improving with the assistance of inpatient therapies and further optimal medical management. Initiation of upfront cancer-directed treatment was delayed due to a complicated hospitalization and given a resolving infection and lowered functional status, radiation alone was initiated on 5/4/2021 and will be completed on 5/27/2021. Due to the near completion of radiation and Olga's ongoing recovering clinical status, will forgo concurrent temozolomide. The plan will be to repeat imaging in 1  month and until then, have Olga continue to focus on improving her functional performance. In June, will initiate temozolomide at adjuvant-dosing. Today, we also discussed the potential role of using Optune in conjunction with temozolomide and Olga will further consider this option.     PROBLEM LIST  Glioblastoma  Aphasia  Apraxia    PLAN  -CANCER-DIRECTED THERAPY-  -As above.     -STEROIDS-  -Off dexamethasone.    -SEIZURE MANAGEMENT-  -Given history of seizures, antiepileptics are indicated.   -Continue Keppra.     -DVT-  -Requires lifelong anticoagulation.   -Continue Lovenox. Can consider transitioning to Xarelto.     -Quality of life/ MOOD/ FATIGUE-  -Denies any mood issues.  -Continue to monitor mood as untreated/ undertreated depression can worsen fatigue, dysorexia, and quality of life.    Return to clinic in 1 month + imaging + labs.     In the meantime, Olga and La Nena know to call with questions or concerns or to report new complaints and can be seen sooner if needed.    Stephanie Baker MD  Neuro-oncology

## 2021-05-24 NOTE — PLAN OF CARE
Discharge Planner Post-Acute Rehab PT:     Discharge Plan: Discharge to [facility] home with home PT at mod I with fww    Precautions: Fall precaution    Current Status:  Bed Mobility: Needs min A for getting to/from edge of bed  Transfer: Gerard stand pivot using ww (nursing to use lift still or A x2); min/modA sit<>stand from EOB using ww, mod/maxA from lower surfaces  Gait: 23 ft x2, min A, w/c follow using ww  Stairs: Not tested at this time due to fatigue and weakness  Balance: Pt has fair sitting balance at edge of bed, but declines with fatigue.  Pt has fair standing balance with UE support.    Assessment:  -20 min d/t fatigue. Pt having difficulty with squencing B feet and maintaining upright posture today, tending to post lean again. Only able to amb about 3 ft min/modA. Mod/maxA for transfers.    Other Barriers to Discharge (DME, Family Training, etc): Stairs, family training

## 2021-05-25 ENCOUNTER — ONCOLOGY VISIT (OUTPATIENT)
Dept: ONCOLOGY | Facility: CLINIC | Age: 76
DRG: 054 | End: 2021-05-25
Attending: PSYCHIATRY & NEUROLOGY
Payer: MEDICARE

## 2021-05-25 ENCOUNTER — ALLIED HEALTH/NURSE VISIT (OUTPATIENT)
Dept: RADIATION ONCOLOGY | Facility: CLINIC | Age: 76
End: 2021-05-25
Attending: STUDENT IN AN ORGANIZED HEALTH CARE EDUCATION/TRAINING PROGRAM
Payer: MEDICARE

## 2021-05-25 VITALS
DIASTOLIC BLOOD PRESSURE: 80 MMHG | RESPIRATION RATE: 16 BRPM | OXYGEN SATURATION: 93 % | HEART RATE: 127 BPM | TEMPERATURE: 98.7 F | SYSTOLIC BLOOD PRESSURE: 113 MMHG

## 2021-05-25 DIAGNOSIS — C71.9 GLIOBLASTOMA (H): ICD-10-CM

## 2021-05-25 DIAGNOSIS — C71.9 GLIOBLASTOMA (H): Primary | ICD-10-CM

## 2021-05-25 PROCEDURE — 97112 NEUROMUSCULAR REEDUCATION: CPT | Mod: GO

## 2021-05-25 PROCEDURE — G0463 HOSPITAL OUTPT CLINIC VISIT: HCPCS

## 2021-05-25 PROCEDURE — 250N000013 HC RX MED GY IP 250 OP 250 PS 637: Performed by: INTERNAL MEDICINE

## 2021-05-25 PROCEDURE — 99215 OFFICE O/P EST HI 40 MIN: CPT | Performed by: PSYCHIATRY & NEUROLOGY

## 2021-05-25 PROCEDURE — 97110 THERAPEUTIC EXERCISES: CPT | Mod: GP

## 2021-05-25 PROCEDURE — 250N000011 HC RX IP 250 OP 636: Performed by: INTERNAL MEDICINE

## 2021-05-25 PROCEDURE — 77386 HC IMRT TREATMENT DELIVERY, COMPLEX: CPT | Performed by: STUDENT IN AN ORGANIZED HEALTH CARE EDUCATION/TRAINING PROGRAM

## 2021-05-25 PROCEDURE — 120N000009 HC R&B SNF

## 2021-05-25 PROCEDURE — 97535 SELF CARE MNGMENT TRAINING: CPT | Mod: GO

## 2021-05-25 RX ADMIN — CALCIUM POLYCARBOPHIL 625 MG: 625 TABLET, FILM COATED ORAL at 11:04

## 2021-05-25 RX ADMIN — SULFAMETHOXAZOLE AND TRIMETHOPRIM 1 TABLET: 400; 80 TABLET ORAL at 11:05

## 2021-05-25 RX ADMIN — ENOXAPARIN SODIUM 40 MG: 40 INJECTION SUBCUTANEOUS at 16:36

## 2021-05-25 RX ADMIN — FUROSEMIDE 20 MG: 20 TABLET ORAL at 11:04

## 2021-05-25 RX ADMIN — LEVETIRACETAM 750 MG: 750 TABLET, FILM COATED ORAL at 20:47

## 2021-05-25 RX ADMIN — BUSPIRONE HYDROCHLORIDE 30 MG: 10 TABLET ORAL at 20:48

## 2021-05-25 RX ADMIN — FLUOXETINE 60 MG: 20 CAPSULE ORAL at 11:03

## 2021-05-25 RX ADMIN — PANTOPRAZOLE SODIUM 40 MG: 40 TABLET, DELAYED RELEASE ORAL at 16:36

## 2021-05-25 RX ADMIN — QUETIAPINE FUMARATE 50 MG: 25 TABLET ORAL at 20:47

## 2021-05-25 RX ADMIN — QUETIAPINE FUMARATE 25 MG: 25 TABLET ORAL at 16:36

## 2021-05-25 RX ADMIN — BUSPIRONE HYDROCHLORIDE 30 MG: 10 TABLET ORAL at 11:04

## 2021-05-25 RX ADMIN — LEVETIRACETAM 750 MG: 750 TABLET, FILM COATED ORAL at 11:05

## 2021-05-25 RX ADMIN — QUETIAPINE FUMARATE 25 MG: 25 TABLET ORAL at 11:04

## 2021-05-25 RX ADMIN — PANTOPRAZOLE SODIUM 40 MG: 40 TABLET, DELAYED RELEASE ORAL at 05:43

## 2021-05-25 RX ADMIN — POTASSIUM CHLORIDE 20 MEQ: 750 TABLET, EXTENDED RELEASE ORAL at 11:04

## 2021-05-25 RX ADMIN — DOXEPIN HYDROCHLORIDE 3 MG: 10 SOLUTION ORAL at 20:48

## 2021-05-25 ASSESSMENT — PAIN SCALES - GENERAL: PAINLEVEL: NO PAIN (0)

## 2021-05-25 NOTE — PLAN OF CARE
Had choking episode this morning. Able to clear throat with cough and drank some nectar thick liquids. She choked on some ground meat which she said she had not eaten before. Anxious after episode. She has an appointment with oncologist this morning and does not want to cancel appointment. Temp-99.8 after episode. See VS and condition stable after episode. Patient got ready for appointment and transferred into W/C with walker, gait belt, and 2 assists. Very weak. Tires very easily.

## 2021-05-25 NOTE — LETTER
5/25/2021         RE: Olga Bailey  [address] [facility] [facility] [address]        Dear Colleague,    Thank you for referring your patient, Olga Bailey, to the [facility] [place]. Please see a copy of my visit note below.    NEURO-ONCOLOGY VISIT  May 25, 2021    CHIEF COMPLAINT: Ms. Olga Bailey is a 75 year old right-handed woman with a left frontoparietal glioblastoma (IDH wild-type, MGMT promoter methylated), diagnosed following gross total resection on 2/23/2021. Initiation of upfront cancer-directed treatment was delayed due to a complicated hospitalization. Given a resolving infection and lowered functional status, radiation alone was initiated on 5/4/2021 and will be completed on 5/27/2021.     Accompanying her to this visit is Rita (daughter).     HISTORY OF PRESENT ILLNESS  -Olga is currently residing in rehab and clinically much improved as compared to a few weeks ago. Participating well with therapies. Needs assistance in standing, can take steps with assistance/ parallel bars. No longer needing a lift.   -Tolerating radiation well, however, more fatigued.   -Mental status improving, though more slowed/ mild confusion. She continues to note only mild aphasia. Working with ST.  -Infrequent headaches. On oxycodone for back pain.   -Denies changes in sensation.  -Off dexamethasone.   -Noted to have a clot; on anticoagulation.   -On Keppra, no recurrent seizures.      REVIEW OF SYSTEMS  A comprehensive ROS negative except as in HPI.      MEDICATIONS   No current facility-administered medications for this visit.      No current outpatient medications on file.     Facility-Administered Medications Ordered in Other Visits   Medication     - Skin Test Reading -     acetaminophen (TYLENOL) tablet 650 mg     albuterol (PROAIR HFA/PROVENTIL HFA/VENTOLIN HFA) 108 (90 Base) MCG/ACT inhaler 2 puff     albuterol (PROVENTIL) neb solution 2.5 mg     amLODIPine (NORVASC)  tablet 5 mg     bisacodyl (DULCOLAX) Suppository 10 mg     busPIRone (BUSPAR) tablet 30 mg     calcium carbonate (TUMS) chewable tablet 1,000 mg     calcium polycarbophil (FIBERCON) tablet 625 mg     carboxymethylcellulose PF (REFRESH PLUS) 0.5 % ophthalmic solution 1 drop     glucose gel 15-30 g    Or     dextrose 50 % injection 25-50 mL    Or     glucagon injection 1 mg     doxepin (SINEquan) 10 MG/ML (HIGH CONC) solution 3 mg     enoxaparin ANTICOAGULANT (LOVENOX) injection 40 mg     eucerin cream     FLUoxetine (PROzac) capsule 60 mg     furosemide (LASIX) tablet 20 mg     levETIRAcetam (KEPPRA) tablet 750 mg     linaclotide (LINZESS) capsule 290 mcg     LORazepam (ATIVAN) tablet 0.5 mg     naloxone (NARCAN) injection 0.2 mg    Or     naloxone (NARCAN) injection 0.4 mg    Or     naloxone (NARCAN) injection 0.2 mg    Or     naloxone (NARCAN) injection 0.4 mg     Nurse may request from Pharmacy a change of form of medication (e.g. Liquid to tablet).     ondansetron (ZOFRAN-ODT) ODT tab 4 mg    Or     ondansetron (ZOFRAN) injection 4 mg     oxyCODONE IR (ROXICODONE) half-tab 2.5 mg     pantoprazole (PROTONIX) EC tablet 40 mg     polyethylene glycol (MIRALAX) powder 17 g     potassium chloride ER (KLOR-CON M) CR tablet 20 mEq     QUEtiapine (SEROquel) tablet 25 mg     QUEtiapine (SEROquel) tablet 50 mg     sennosides (SENOKOT) tablet 1 tablet     simethicone (MYLICON) suspension 40 mg     sulfamethoxazole-trimethoprim (BACTRIM) 400-80 MG per tablet 1 tablet     No current outpatient medications on file.     DRUG ALLERGIES   Allergies   Allergen Reactions     Bacitracin Rash     Bactroban is effective; No difficulties     Erythromycin      intolerant.     Meperidine Hcl      intolerant only   Demerol     Chloraprep One Step Rash     IMMUNIZATIONS   Immunization History   Administered Date(s) Administered     COVID-19,PF,Moderna 03/18/2021     Flu 65+ Years 09/28/2020     HepB 01/01/1990     Influenza (IIV3) PF  01/01/2001     Influenza, Quad, High Dose, Pf, 65yr + 09/28/2020     Pneumococcal 23 valent 07/22/2020     Tetanus 06/13/2004     Zoster vaccine recombinant adjuvanted (SHINGRIX) 12/24/2019, 10/12/2020       ONCOLOGIC HISTORY  -2/2021 PRESENTATION: Progressive aphasia.   -2/19/2021 MR brain imaging with 3.3 x 2.8 x 2.8 cm enhancing mass in left frontal-parietal region. A second contrast enhancing lesion (0.5 x 1.0 x 1.0 cm) in right occipital lobe which is dural based and largely stable in size since 9/2011; likely representing a meningioma.  -2/23/2021 SURGERY: Gross total resection by Dr. Cummings  PATHOLOGY: Glioblastoma; IDH1-R132H wild-type/ IDH 1 and 2 wildtype, MGMT promoter methylated. Not BRAF mutated.   -3/23/2021 NEURO-ONC: Recommending chemoradiotherapy.   -3/2021 ADMISSION: SOB and chest pain; CT PA negative, but bilateral DVT noted on U/S. Started on Lovenox. Acute hypoxic respiratory failure in the setting of bilateral pneumonia; presumed PJP, concern for transfusion-related acute lung injury and right hemidiaphragm paralysis. Seizure. Right retroperitoneal hematoma. Ileus. Non-severe malnutrition on TPN with concern for refeeding syndrome. 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 Shock Liver.  -5/4 - 5/27/2021 RADS: 15 fractions.   -5/25/2021 NEURO-ONC/ DEVICE: Discussed the role of Optune; to be considered. Repeat MRI in 4 weeks with plans to start adjuvant temozolomide.     SOCIAL HISTORY   History   Smoking Status     Never Smoker   Smokeless Tobacco     Never Used    Social History    Substance and Sexual Activity      Alcohol use: Yes        Comment: very rare     History   Drug Use No       PHYSICAL EXAMINATION  /80 (BP Location: Right arm, Patient Position: Sitting, Cuff Size: Adult Regular)   Pulse 127   Temp 98.7  F (37.1  C) (Oral)   Resp 16   SpO2 93%   Wt Readings from Last 2 Encounters:   05/19/21 69.9 kg (154 lb)   05/10/21 67.1 kg (147 lb 14.9 oz)      Ht Readings from Last 2  "Encounters:   03/26/21 1.6 m (5' 3\")   03/23/21 1.6 m (5' 3\")     KPS: 60    -Generally well appearing. Fatigued.   -Respiratory: Mild shortness of breath, with mild wheezing.   -Skin: Facial rash on cheeks of face. Bruising on arm and abdomen (lovenox injections).   -Psychiatric: Normal mood and affect. Pleasant, talkative.  -Neurologic:   MENTAL STATUS:     Alert, oriented to date (May, 23, 2021)    Recall: Intact.     Speech largely fluent; mild hesitation. No significant word-finding issues noted.    Comprehension intact to multi-step commands.   Good right-left orientation.     CRANIAL NERVES:     Pupils are equal, round.     Extraocular movements full, denies diplopia.     Visual fields full.     Facial sensation intact to light touch.   No right facial droop today, (??) nasolabial fold flattening.   Hearing slightly decreased bilaterally.   Normal phonation.   MOTOR: No pronation or drift. (Pronation at baseline on right; related to shoulder).   SENSATION: Intact to light touch throughout.  COORDINATION: Slight impairment on finger-nose with eyes open and closed on the right.  GAIT: Sitting in wheelchair, did not test.       MEDICAL RECORDS  Obtained and personally reviewed all available outside medical records in addition to reviewing any records available in our electronic system.     LABS  Personally reviewed all available lab results.     IMAGING  No new neuro-imaging to review.        IMPRESSION  Clinic time for this high complexity encounter was spent discussing in detail the nature of her cancer, providing emotional support, answering questions pertaining to my recommendations, and devising the plan as outlined below.     Clinically, I am pleased with how well Olga is improving with the assistance of inpatient therapies and further optimal medical management. Initiation of upfront cancer-directed treatment was delayed due to a complicated hospitalization and given a resolving infection and lowered " "functional status, radiation alone was initiated on 5/4/2021 and will be completed on 5/27/2021. Due to the near completion of radiation and Olga's ongoing recovering clinical status, will forgo concurrent temozolomide. The plan will be to repeat imaging in 1 month and until then, have Olga continue to focus on improving her functional performance. In June, will initiate temozolomide at adjuvant-dosing. Today, we also discussed the potential role of using Optune in conjunction with temozolomide and Olga will further consider this option.     PROBLEM LIST  Glioblastoma  Aphasia  Apraxia    PLAN  -CANCER-DIRECTED THERAPY-  -As above.     -STEROIDS-  -Off dexamethasone.    -SEIZURE MANAGEMENT-  -Given history of seizures, antiepileptics are indicated.   -Continue Keppra.     -DVT-  -Requires lifelong anticoagulation.   -Continue Lovenox. Can consider transitioning to Xarelto.     -Quality of life/ MOOD/ FATIGUE-  -Denies any mood issues.  -Continue to monitor mood as untreated/ undertreated depression can worsen fatigue, dysorexia, and quality of life.    Return to clinic in 1 month + imaging + labs.     In the meantime, Olga and La Nena know to call with questions or concerns or to report new complaints and can be seen sooner if needed.    Stephanie Baker MD  Neuro-oncology      Oncology Rooming Note    May 25, 2021 9:30 AM   Olga Bailey is a 75 year old female who presents for:    Chief Complaint   Patient presents with     Oncology Clinic Visit     Glioblastoma -- S/P Surg Resection 2/23/21     Initial Vitals: /80 (BP Location: Right arm, Patient Position: Sitting, Cuff Size: Adult Regular)   Pulse 127   Temp 98.7  F (37.1  C) (Oral)   Resp 16   SpO2 93%  Estimated body mass index is 27.28 kg/m  as calculated from the following:    Height as of 3/26/21: 1.6 m (5' 3\").    Weight as of 5/19/21: 69.9 kg (154 lb). There is no height or weight on file to calculate BSA.  No Pain (0) " Comment: Data Unavailable   No LMP recorded. Patient has had a hysterectomy.  Allergies reviewed: Yes  Medications reviewed: Yes    Medications: Medication refills not needed today.  Pharmacy name entered into [facility]:    [id number] [place] [facility] - [place], [place] - [address] [id number] - [place] - [address] [facility] [address]  [address] [id number] - [place], [place] [address] AT [facility] [address]  [facility] - [place], [place] [facility] [address] [facility]    Clinical concerns: no    Paula Turner CMA                  Again, thank you for allowing me to participate in the care of your patient.        Sincerely,        Stephanie Baker MD

## 2021-05-25 NOTE — PLAN OF CARE
Discharge Planner Post-Acute Rehab PT:     Discharge Plan: Discharge to [facility] home with home PT at mod I with fww    Precautions: Fall precaution    Current Status:  Bed Mobility: Needs min A for getting to/from edge of bed  Transfer: Gerard stand pivot using ww (nursing to use lift still or A x2); min/modA sit<>stand from EOB using ww, mod/maxA from lower surfaces  Gait: 23 ft x2, min A, w/c follow using ww  Stairs: Not tested at this time due to fatigue and weakness  Balance: Pt has fair sitting balance at edge of bed, but declines with fatigue.  Pt has fair standing balance with UE support.    Assessment:  -30 min Pt requesting to remain in bed for supine ex, has appt with  in about 60 min. Pt wanting to get dressed and washed up with nursing , eat breakfast before going.    Other Barriers to Discharge (DME, Family Training, etc): Stairs, family training

## 2021-05-25 NOTE — PLAN OF CARE
Patient's most recent vital signs are:     Vital signs:  BP: 113/76  Temp: 99  HR: 132  RR: 20  SpO2: 92 %     Patient does not have new respiratory symptoms.  Patient does not have new sore throat.  Patient does not have a fever greater than 99.5.    Returned from oncology appointment and very tired. Went back to bed, as has radiation this afternoon at 1400. Temp-99.0. No ill effects from choking episode this morning. Very happy she was able to see her daughter at the appointment this morning.

## 2021-05-25 NOTE — PLAN OF CARE
Alert and oriented, VSS. Denies pain, no SOB. Routine asymptomatic COVID swab done. Appetite good. Promoted fluid intake w/ nectar thick liquids. Blanchable redness to inner buttock, barrier cream applied. Incontinent of B/B at times; voiding via purewick. LBM 5/24. Declines BiPAP at night, will plan to spot check SpO2 once pt asleep.     Patient's most recent vital signs are:     Vital signs:  BP: 118/76  Temp: 96.6  HR: 100  RR: 16  SpO2: 94 %     Patient does not have new respiratory symptoms.  Patient does not have new sore throat.  Patient does not have a fever greater than 99.5.

## 2021-05-25 NOTE — PROGRESS NOTES
"CLINICAL NUTRITION SERVICES - REASSESSMENT NOTE     Nutrition Prescription    RECOMMENDATIONS FOR MDs/PROVIDERS TO ORDER:  None today    Malnutrition Status:    Unable to determine    Recommendations already ordered by Registered Dietitian (RD):  None today     Future/Additional Recommendations:  Adjust Magic Shake pending pt preference      EVALUATION OF THE PROGRESS TOWARD GOALS   Diet: Dysphagia Level 2: Mechanically Altered and Nectar Thickened Liquids, Magic Shake (vanilla) at PM snack, RSA  Intake: 3 day average PO intake (5/17-20) = 1465 kcal (100% minimum energy needs) and 56 g protein (99% minimum protein needs)    Did not visit with pt as she already off unit to ED.    NEW FINDINGS   - Per 5/26 SLP note, \"VFSS 4/28 and 5/21/21Noting silent aspiration during/after with thin fluid by cup, recommend continue nectar fluids, NDD2 diet, sit up for po, small bites and sips slow pace, Monitor for sx aspiration (note pt silent aspiration on thin).\"  - Pt has lost 10 lbs (6%) over the last ~1 month. Pt previously reported UBW of ~130 lbs, last weighing this ~8 months ago. She would not like to gain weight.  05/19/21 : 69.9 kg (154 lb)   05/15/21 : 70.3 kg (155 lb)  05/10/21 : 67.1 kg (147 lb 14.9 oz)  04/26/21 : 74.4 kg (164 lb 0.4 oz)  03/23/21 : 70 kg (154 lb 6.4 oz)  03/08/21 : 69.9 kg (154 lb)  02/26/21 : 74.4 kg (164 lb 1.6 oz)    MALNUTRITION  % Intake: Decreased intake does not meet criteria  % Weight Loss: > 5% in 1 month (severe)  Subcutaneous Fat Loss: Unable to assess  Muscle Loss: Unable to assess  Fluid Accumulation/Edema: Trace  Malnutrition Diagnosis: Unable to determine    Previous Goals   Patient to consume % of nutritionally adequate meal trays TID, or the equivalent with supplements/snacks.  Evaluation: Met    Previous Nutrition Diagnosis  Predicted inadequate nutrient intake- Kcal, protein related to swallowing difficulty as evidenced by dysphagia 1 diet order with nectar thickened liquids, " variable PO intake since transition from TPN on 5.6.21, reliance of MFS to complete adequate kcal/protein goals during last hospital admission  Evaluation: Improving    CURRENT NUTRITION DIAGNOSIS  Predicted inadequate protein-energy intake related to variable appetite as evidenced by pt reliant on PO intakes to meet 100% of nutritional needs with potential for variation       INTERVENTIONS  Implementation  None today - pt already off unit    Goals  Patient to consume % of nutritionally adequate meal trays TID, or the equivalent with supplements/snacks.    Monitoring/Evaluation  Progress toward goals will be monitored and evaluated per protocol.      Alicia Francis RD, LD  TCU/8A Pager (M-F): [phone]  Weekend Pager ([place]): [phone]

## 2021-05-25 NOTE — PLAN OF CARE
SLP -30 2/2 pt getting ready/leaving for appointment.  RN and pt reported she had coughed on NDD2/sausage this am.  Will continue to monitor - pt states this is only item on advanced diet she had difficulty with.

## 2021-05-25 NOTE — PROGRESS NOTES
"Oncology Rooming Note    May 25, 2021 9:30 AM   Olga Bailey is a 75 year old female who presents for:    Chief Complaint   Patient presents with     Oncology Clinic Visit     Glioblastoma -- S/P Surg Resection 2/23/21     Initial Vitals: /80 (BP Location: Right arm, Patient Position: Sitting, Cuff Size: Adult Regular)   Pulse 127   Temp 98.7  F (37.1  C) (Oral)   Resp 16   SpO2 93%  Estimated body mass index is 27.28 kg/m  as calculated from the following:    Height as of 3/26/21: 1.6 m (5' 3\").    Weight as of 5/19/21: 69.9 kg (154 lb). There is no height or weight on file to calculate BSA.  No Pain (0) Comment: Data Unavailable   No LMP recorded. Patient has had a hysterectomy.  Allergies reviewed: Yes  Medications reviewed: Yes    Medications: Medication refills not needed today.  Pharmacy name entered into [facility]:    [id number] - [place], [place] - [address] [id number] - [place], [place] - [address] [facility]  [id number] - [place], [place] - [address] AT [facility] [address]  [facility] - [place], [place] - [address]    Clinical concerns: no    Paula Turner CMA              "

## 2021-05-25 NOTE — PLAN OF CARE
"Discharge Planner Post-Acute Rehab OT:      Discharge Plan: discharge to [facility] home, pt will need to be home alone intermittently, home health and home OT     Precautions: weakness RUE, poor midline sitting unsupported, falls     Current Status:  ADLs: Pt requires set-up for gr/hyg and oral cares, min A UB dressing supported seated in bed, min A LB dressing, mod A supine to sit w/ HOB raised and use of side rail, CGA x2 STS w/fww on 5/21, Max A bathing in rolling shower chair, pt able to participate in hair washing, UB sponge bathing, and cleansing brda area  IADLs: Pt had been IND with IADLs PTA.  Vision/Cognition: Pt wears glasses and notes a change in her cognition.  She is partially oriented and follows directions. On 5/24 OT: impaired memory, impaired attn/concentration and decreased problem sovling w/ limited awareness of deficits when questioned about things that are difficult,. increased awareness at end of session after pt /th discussed performance and pt's own observation of \"this is harder than I thought\" , sorted money by type of coin w/ min A for sorting and attn, indep stated value of each coin w/ 100% accur but max assist to do simple counting total value of multiple coins in a pile.      Assessment:  Pt declined OOB activity, fatigued from AM appt.  Pt followed cues for LUE ex, SROM/reciprical UE ex.  Pt needed cues for proximal ex/or ex that were 2+ steps, as it required more p.s. Pt fatigued at end of each set and she recognizes the LUE weakness but pt puts for good effort.  Leg crossing in bed to improve reach for LB dressing. Pt making slow progress noted with sh strength today.  Con't OT POC with focus on Transfers, bed mob, ADLs and LUE strengthening/functional use, and functional cognitive tasks.     Other Barriers to Discharge (DME, Family Training, etc): Family training, consider AE for bathroom. Pt will be on the main floor at [facility]'s home for kitching/living/bathroom but her bed is upstairs. "  Dtr has tub/shower upstairs, toilet on the main floor and walk in shower on the lower level.  Pt is used to a high toilet at her own house and a walk in shower with grab bars.

## 2021-05-25 NOTE — PLAN OF CARE
No acute issues overnight. Sleeping well and has no c/o's or requests as of yet. [employer] intact/patent. Has Oncology appt.this AM @ [facility] - pickup @ 0860 via wheelchair and also Radiation this afternoon - pickup @ 8656.        Patient's most recent vital signs are:     Vital signs:  BP: 118/76  Temp: 96.6  HR: 100  RR: 16  SpO2: 94 %     Patient does not have new respiratory symptoms.  Patient does not have new sore throat.  Patient does not have a fever greater than 99.5.

## 2021-05-26 ENCOUNTER — APPOINTMENT (OUTPATIENT)
Dept: CT IMAGING | Facility: CLINIC | Age: 76
End: 2021-05-26
Attending: INTERNAL MEDICINE
Payer: MEDICARE

## 2021-05-26 ENCOUNTER — APPOINTMENT (OUTPATIENT)
Dept: CT IMAGING | Facility: CLINIC | Age: 76
DRG: 054 | End: 2021-05-26
Attending: EMERGENCY MEDICINE
Payer: MEDICARE

## 2021-05-26 ENCOUNTER — HOSPITAL ENCOUNTER (INPATIENT)
Facility: CLINIC | Age: 76
LOS: 10 days | Discharge: NURSING, REHAB OR LONG-TERM CARE | DRG: 054 | End: 2021-06-05
Attending: EMERGENCY MEDICINE | Admitting: STUDENT IN AN ORGANIZED HEALTH CARE EDUCATION/TRAINING PROGRAM
Payer: MEDICARE

## 2021-05-26 VITALS
OXYGEN SATURATION: 95 % | BODY MASS INDEX: 27.28 KG/M2 | RESPIRATION RATE: 24 BRPM | WEIGHT: 154 LBS | HEART RATE: 90 BPM | DIASTOLIC BLOOD PRESSURE: 82 MMHG | SYSTOLIC BLOOD PRESSURE: 120 MMHG | TEMPERATURE: 98.3 F

## 2021-05-26 DIAGNOSIS — R09.02 HYPOXEMIA: ICD-10-CM

## 2021-05-26 DIAGNOSIS — Z85.841 PERSONAL HISTORY OF MALIGNANT NEOPLASM OF BRAIN: ICD-10-CM

## 2021-05-26 DIAGNOSIS — F41.9 ANXIETY: ICD-10-CM

## 2021-05-26 DIAGNOSIS — N12 PYELONEPHRITIS: ICD-10-CM

## 2021-05-26 DIAGNOSIS — Z20.822 CONTACT WITH AND (SUSPECTED) EXPOSURE TO COVID-19: ICD-10-CM

## 2021-05-26 DIAGNOSIS — G40.909 RECURRENT SEIZURES (H): ICD-10-CM

## 2021-05-26 DIAGNOSIS — F43.21 ADJUSTMENT DISORDER WITH DEPRESSED MOOD: Primary | ICD-10-CM

## 2021-05-26 DIAGNOSIS — C71.9 GBM (GLIOBLASTOMA MULTIFORME) (H): ICD-10-CM

## 2021-05-26 DIAGNOSIS — R00.0 TACHYCARDIA, UNSPECIFIED: ICD-10-CM

## 2021-05-26 LAB
ALBUMIN SERPL-MCNC: 3.3 G/DL (ref 3.4–5)
ALBUMIN UR-MCNC: NEGATIVE MG/DL
ALP SERPL-CCNC: 106 U/L (ref 40–150)
ALT SERPL W P-5'-P-CCNC: 31 U/L (ref 0–50)
ANION GAP SERPL CALCULATED.3IONS-SCNC: 11 MMOL/L (ref 3–14)
APPEARANCE UR: CLEAR
AST SERPL W P-5'-P-CCNC: 23 U/L (ref 0–45)
BACTERIA #/AREA URNS HPF: ABNORMAL /HPF
BILIRUB SERPL-MCNC: 0.5 MG/DL (ref 0.2–1.3)
BILIRUB UR QL STRIP: NEGATIVE
BUN SERPL-MCNC: 17 MG/DL (ref 7–30)
CALCIUM SERPL-MCNC: 9.6 MG/DL (ref 8.5–10.1)
CHLORIDE SERPL-SCNC: 105 MMOL/L (ref 94–109)
CO2 SERPL-SCNC: 25 MMOL/L (ref 20–32)
COLOR UR AUTO: ABNORMAL
CREAT SERPL-MCNC: 0.4 MG/DL (ref 0.52–1.04)
CREAT SERPL-MCNC: 0.57 MG/DL (ref 0.52–1.04)
CREAT SERPL-MCNC: 0.58 MG/DL (ref 0.52–1.04)
ERYTHROCYTE [DISTWIDTH] IN BLOOD BY AUTOMATED COUNT: 17.9 % (ref 10–15)
GFR SERPL CREATININE-BSD FRML MDRD: 89 ML/MIN/{1.73_M2}
GFR SERPL CREATININE-BSD FRML MDRD: >90 ML/MIN/{1.73_M2}
GFR SERPL CREATININE-BSD FRML MDRD: >90 ML/MIN/{1.73_M2}
GLUCOSE BLDC GLUCOMTR-MCNC: 131 MG/DL (ref 70–99)
GLUCOSE SERPL-MCNC: 132 MG/DL (ref 70–99)
GLUCOSE UR STRIP-MCNC: NEGATIVE MG/DL
HCT VFR BLD AUTO: 39.6 % (ref 35–47)
HGB BLD-MCNC: 12.5 G/DL (ref 11.7–15.7)
HGB UR QL STRIP: NEGATIVE
KETONES UR STRIP-MCNC: NEGATIVE MG/DL
LABORATORY COMMENT REPORT: NORMAL
LACTATE BLD-SCNC: 1.4 MMOL/L (ref 0.7–2)
LACTATE BLD-SCNC: 1.9 MMOL/L (ref 0.7–2)
LEUKOCYTE ESTERASE UR QL STRIP: ABNORMAL
MAGNESIUM SERPL-MCNC: 1.9 MG/DL (ref 1.6–2.3)
MCH RBC QN AUTO: 30.6 PG (ref 26.5–33)
MCHC RBC AUTO-ENTMCNC: 31.6 G/DL (ref 31.5–36.5)
MCV RBC AUTO: 97 FL (ref 78–100)
NITRATE UR QL: NEGATIVE
PH UR STRIP: 5.5 PH (ref 5–7)
PLATELET # BLD AUTO: 236 10E9/L (ref 150–450)
POTASSIUM SERPL-SCNC: 3.6 MMOL/L (ref 3.4–5.3)
PROT SERPL-MCNC: 6.8 G/DL (ref 6.8–8.8)
RBC # BLD AUTO: 4.09 10E12/L (ref 3.8–5.2)
RBC #/AREA URNS AUTO: 4 /HPF (ref 0–2)
SARS-COV-2 RNA RESP QL NAA+PROBE: NEGATIVE
SODIUM SERPL-SCNC: 141 MMOL/L (ref 133–144)
SOURCE: ABNORMAL
SP GR UR STRIP: 1.03 (ref 1–1.03)
SPECIMEN SOURCE: NORMAL
SQUAMOUS #/AREA URNS AUTO: 0 /HPF (ref 0–1)
TRANS CELLS #/AREA URNS HPF: <1 /HPF (ref 0–1)
UROBILINOGEN UR STRIP-MCNC: NORMAL MG/DL (ref 0–2)
WBC # BLD AUTO: 9.8 10E9/L (ref 4–11)
WBC #/AREA URNS AUTO: 20 /HPF (ref 0–5)

## 2021-05-26 PROCEDURE — 250N000011 HC RX IP 250 OP 636: Performed by: STUDENT IN AN ORGANIZED HEALTH CARE EDUCATION/TRAINING PROGRAM

## 2021-05-26 PROCEDURE — 99316 NF DSCHRG MGMT 30 MIN+: CPT | Performed by: INTERNAL MEDICINE

## 2021-05-26 PROCEDURE — 96365 THER/PROPH/DIAG IV INF INIT: CPT | Mod: 59 | Performed by: EMERGENCY MEDICINE

## 2021-05-26 PROCEDURE — G1004 CDSM NDSC: HCPCS | Performed by: RADIOLOGY

## 2021-05-26 PROCEDURE — 83605 ASSAY OF LACTIC ACID: CPT | Performed by: STUDENT IN AN ORGANIZED HEALTH CARE EDUCATION/TRAINING PROGRAM

## 2021-05-26 PROCEDURE — U0003 INFECTIOUS AGENT DETECTION BY NUCLEIC ACID (DNA OR RNA); SEVERE ACUTE RESPIRATORY SYNDROME CORONAVIRUS 2 (SARS-COV-2) (CORONAVIRUS DISEASE [COVID-19]), AMPLIFIED PROBE TECHNIQUE, MAKING USE OF HIGH THROUGHPUT TECHNOLOGIES AS DESCRIBED BY CMS-2020-01-R: HCPCS | Performed by: EMERGENCY MEDICINE

## 2021-05-26 PROCEDURE — 99207 CT HEAD W/O CONTRAST: CPT | Mod: 26 | Performed by: RADIOLOGY

## 2021-05-26 PROCEDURE — G1004 CDSM NDSC: HCPCS | Mod: GC | Performed by: RADIOLOGY

## 2021-05-26 PROCEDURE — 250N000012 HC RX MED GY IP 250 OP 636 PS 637: Performed by: STUDENT IN AN ORGANIZED HEALTH CARE EDUCATION/TRAINING PROGRAM

## 2021-05-26 PROCEDURE — 250N000013 HC RX MED GY IP 250 OP 250 PS 637: Performed by: INTERNAL MEDICINE

## 2021-05-26 PROCEDURE — 97535 SELF CARE MNGMENT TRAINING: CPT | Mod: GO

## 2021-05-26 PROCEDURE — 71275 CT ANGIOGRAPHY CHEST: CPT | Mod: ME

## 2021-05-26 PROCEDURE — 83605 ASSAY OF LACTIC ACID: CPT | Performed by: INTERNAL MEDICINE

## 2021-05-26 PROCEDURE — 74176 CT ABD & PELVIS W/O CONTRAST: CPT | Mod: 26 | Performed by: RADIOLOGY

## 2021-05-26 PROCEDURE — 82565 ASSAY OF CREATININE: CPT | Performed by: EMERGENCY MEDICINE

## 2021-05-26 PROCEDURE — 82962 GLUCOSE BLOOD TEST: CPT

## 2021-05-26 PROCEDURE — 80053 COMPREHEN METABOLIC PANEL: CPT | Performed by: INTERNAL MEDICINE

## 2021-05-26 PROCEDURE — 70496 CT ANGIOGRAPHY HEAD: CPT | Mod: 26 | Performed by: RADIOLOGY

## 2021-05-26 PROCEDURE — 250N000013 HC RX MED GY IP 250 OP 250 PS 637: Performed by: STUDENT IN AN ORGANIZED HEALTH CARE EDUCATION/TRAINING PROGRAM

## 2021-05-26 PROCEDURE — 99223 1ST HOSP IP/OBS HIGH 75: CPT | Mod: AI | Performed by: STUDENT IN AN ORGANIZED HEALTH CARE EDUCATION/TRAINING PROGRAM

## 2021-05-26 PROCEDURE — 36415 COLL VENOUS BLD VENIPUNCTURE: CPT | Performed by: STUDENT IN AN ORGANIZED HEALTH CARE EDUCATION/TRAINING PROGRAM

## 2021-05-26 PROCEDURE — 82565 ASSAY OF CREATININE: CPT | Performed by: STUDENT IN AN ORGANIZED HEALTH CARE EDUCATION/TRAINING PROGRAM

## 2021-05-26 PROCEDURE — 92526 ORAL FUNCTION THERAPY: CPT | Mod: GN

## 2021-05-26 PROCEDURE — 258N000003 HC RX IP 258 OP 636: Performed by: EMERGENCY MEDICINE

## 2021-05-26 PROCEDURE — 71275 CT ANGIOGRAPHY CHEST: CPT | Mod: 26 | Performed by: RADIOLOGY

## 2021-05-26 PROCEDURE — 120N000002 HC R&B MED SURG/OB UMMC

## 2021-05-26 PROCEDURE — 87086 URINE CULTURE/COLONY COUNT: CPT | Performed by: EMERGENCY MEDICINE

## 2021-05-26 PROCEDURE — 87040 BLOOD CULTURE FOR BACTERIA: CPT | Performed by: EMERGENCY MEDICINE

## 2021-05-26 PROCEDURE — 99221 1ST HOSP IP/OBS SF/LOW 40: CPT | Mod: GC | Performed by: PSYCHIATRY & NEUROLOGY

## 2021-05-26 PROCEDURE — 85027 COMPLETE CBC AUTOMATED: CPT | Performed by: INTERNAL MEDICINE

## 2021-05-26 PROCEDURE — 96367 TX/PROPH/DG ADDL SEQ IV INF: CPT | Performed by: EMERGENCY MEDICINE

## 2021-05-26 PROCEDURE — C9803 HOPD COVID-19 SPEC COLLECT: HCPCS | Performed by: EMERGENCY MEDICINE

## 2021-05-26 PROCEDURE — 36592 COLLECT BLOOD FROM PICC: CPT | Performed by: STUDENT IN AN ORGANIZED HEALTH CARE EDUCATION/TRAINING PROGRAM

## 2021-05-26 PROCEDURE — 74176 CT ABD & PELVIS W/O CONTRAST: CPT | Mod: ME

## 2021-05-26 PROCEDURE — 250N000009 HC RX 250: Performed by: INTERNAL MEDICINE

## 2021-05-26 PROCEDURE — 70498 CT ANGIOGRAPHY NECK: CPT | Mod: 26 | Performed by: RADIOLOGY

## 2021-05-26 PROCEDURE — 250N000011 HC RX IP 250 OP 636: Performed by: EMERGENCY MEDICINE

## 2021-05-26 PROCEDURE — 96361 HYDRATE IV INFUSION ADD-ON: CPT | Performed by: EMERGENCY MEDICINE

## 2021-05-26 PROCEDURE — U0005 INFEC AGEN DETEC AMPLI PROBE: HCPCS | Performed by: EMERGENCY MEDICINE

## 2021-05-26 PROCEDURE — 97530 THERAPEUTIC ACTIVITIES: CPT | Mod: GP

## 2021-05-26 PROCEDURE — 99285 EMERGENCY DEPT VISIT HI MDM: CPT | Performed by: EMERGENCY MEDICINE

## 2021-05-26 PROCEDURE — 80177 DRUG SCRN QUAN LEVETIRACETAM: CPT | Performed by: INTERNAL MEDICINE

## 2021-05-26 PROCEDURE — 250N000013 HC RX MED GY IP 250 OP 250 PS 637: Performed by: EMERGENCY MEDICINE

## 2021-05-26 PROCEDURE — 96366 THER/PROPH/DIAG IV INF ADDON: CPT | Performed by: EMERGENCY MEDICINE

## 2021-05-26 PROCEDURE — 81001 URINALYSIS AUTO W/SCOPE: CPT | Performed by: EMERGENCY MEDICINE

## 2021-05-26 PROCEDURE — 70496 CT ANGIOGRAPHY HEAD: CPT

## 2021-05-26 PROCEDURE — 36415 COLL VENOUS BLD VENIPUNCTURE: CPT | Performed by: INTERNAL MEDICINE

## 2021-05-26 PROCEDURE — 70450 CT HEAD/BRAIN W/O DYE: CPT

## 2021-05-26 PROCEDURE — 250N000011 HC RX IP 250 OP 636: Performed by: INTERNAL MEDICINE

## 2021-05-26 PROCEDURE — 99285 EMERGENCY DEPT VISIT HI MDM: CPT | Mod: 25 | Performed by: EMERGENCY MEDICINE

## 2021-05-26 PROCEDURE — 83735 ASSAY OF MAGNESIUM: CPT | Performed by: EMERGENCY MEDICINE

## 2021-05-26 RX ORDER — CALCIUM CARBONATE 500 MG/1
2 TABLET, CHEWABLE ORAL 4 TIMES DAILY PRN
Status: ON HOLD | DISCHARGE
Start: 2021-05-26 | End: 2021-07-15

## 2021-05-26 RX ORDER — POLYETHYLENE GLYCOL 3350 17 G/17G
17 POWDER, FOR SOLUTION ORAL DAILY PRN
Status: DISCONTINUED | OUTPATIENT
Start: 2021-05-26 | End: 2021-06-05 | Stop reason: HOSPADM

## 2021-05-26 RX ORDER — ALBUTEROL SULFATE 90 UG/1
2 AEROSOL, METERED RESPIRATORY (INHALATION) EVERY 4 HOURS PRN
Status: DISCONTINUED | OUTPATIENT
Start: 2021-05-26 | End: 2021-06-05 | Stop reason: HOSPADM

## 2021-05-26 RX ORDER — DOXEPIN HYDROCHLORIDE 10 MG/ML
3 SOLUTION ORAL AT BEDTIME
Status: DISCONTINUED | OUTPATIENT
Start: 2021-05-26 | End: 2021-06-05 | Stop reason: HOSPADM

## 2021-05-26 RX ORDER — PANTOPRAZOLE SODIUM 40 MG/1
40 TABLET, DELAYED RELEASE ORAL
Status: DISCONTINUED | OUTPATIENT
Start: 2021-05-27 | End: 2021-06-05 | Stop reason: HOSPADM

## 2021-05-26 RX ORDER — CARBOXYMETHYLCELLULOSE SODIUM 5 MG/ML
1 SOLUTION/ DROPS OPHTHALMIC
Status: DISCONTINUED | OUTPATIENT
Start: 2021-05-26 | End: 2021-06-05 | Stop reason: HOSPADM

## 2021-05-26 RX ORDER — CALCIUM POLYCARBOPHIL 625 MG 625 MG/1
625 TABLET ORAL DAILY
Status: DISCONTINUED | OUTPATIENT
Start: 2021-05-27 | End: 2021-06-05 | Stop reason: HOSPADM

## 2021-05-26 RX ORDER — AMLODIPINE BESYLATE 5 MG/1
5 TABLET ORAL DAILY
Status: DISCONTINUED | OUTPATIENT
Start: 2021-05-27 | End: 2021-06-05 | Stop reason: HOSPADM

## 2021-05-26 RX ORDER — LORAZEPAM 0.5 MG/1
0.5 TABLET ORAL EVERY 4 HOURS PRN
Status: DISCONTINUED | OUTPATIENT
Start: 2021-05-26 | End: 2021-06-05 | Stop reason: HOSPADM

## 2021-05-26 RX ORDER — NICOTINE POLACRILEX 4 MG
15-30 LOZENGE BUCCAL
Status: ON HOLD | DISCHARGE
Start: 2021-05-26 | End: 2021-06-04

## 2021-05-26 RX ORDER — FUROSEMIDE 20 MG
40 TABLET ORAL DAILY
Status: DISCONTINUED | OUTPATIENT
Start: 2021-05-27 | End: 2021-06-05 | Stop reason: HOSPADM

## 2021-05-26 RX ORDER — BISACODYL 10 MG
10 SUPPOSITORY, RECTAL RECTAL DAILY PRN
Status: DISCONTINUED | OUTPATIENT
Start: 2021-05-26 | End: 2021-06-05 | Stop reason: HOSPADM

## 2021-05-26 RX ORDER — DEXAMETHASONE 2 MG/1
2 TABLET ORAL DAILY
Status: DISCONTINUED | OUTPATIENT
Start: 2021-06-03 | End: 2021-06-05 | Stop reason: HOSPADM

## 2021-05-26 RX ORDER — DEXAMETHASONE 2 MG/1
2 TABLET ORAL EVERY 12 HOURS SCHEDULED
Status: COMPLETED | OUTPATIENT
Start: 2021-05-26 | End: 2021-06-02

## 2021-05-26 RX ORDER — ALBUTEROL SULFATE 0.83 MG/ML
2.5 SOLUTION RESPIRATORY (INHALATION) EVERY 4 HOURS PRN
Status: DISCONTINUED | OUTPATIENT
Start: 2021-05-26 | End: 2021-06-05 | Stop reason: HOSPADM

## 2021-05-26 RX ORDER — IOPAMIDOL 755 MG/ML
100 INJECTION, SOLUTION INTRAVASCULAR ONCE
Status: COMPLETED | OUTPATIENT
Start: 2021-05-26 | End: 2021-05-26

## 2021-05-26 RX ORDER — LEVETIRACETAM 15 MG/ML
1500 INJECTION INTRAVASCULAR ONCE
Status: COMPLETED | OUTPATIENT
Start: 2021-05-26 | End: 2021-05-26

## 2021-05-26 RX ORDER — SENNOSIDES 8.6 MG
1 TABLET ORAL 2 TIMES DAILY PRN
Status: DISCONTINUED | OUTPATIENT
Start: 2021-05-26 | End: 2021-06-05 | Stop reason: HOSPADM

## 2021-05-26 RX ORDER — LIDOCAINE 40 MG/G
CREAM TOPICAL
Status: DISCONTINUED | OUTPATIENT
Start: 2021-05-26 | End: 2021-06-05 | Stop reason: HOSPADM

## 2021-05-26 RX ORDER — SULFAMETHOXAZOLE AND TRIMETHOPRIM 400; 80 MG/1; MG/1
1 TABLET ORAL DAILY
Status: DISCONTINUED | OUTPATIENT
Start: 2021-05-26 | End: 2021-06-05 | Stop reason: HOSPADM

## 2021-05-26 RX ORDER — CEFTRIAXONE 1 G/1
1 INJECTION, POWDER, FOR SOLUTION INTRAMUSCULAR; INTRAVENOUS EVERY 24 HOURS
Status: DISCONTINUED | OUTPATIENT
Start: 2021-05-27 | End: 2021-05-28

## 2021-05-26 RX ORDER — SODIUM CHLORIDE 9 MG/ML
INJECTION, SOLUTION INTRAVENOUS
Status: DISCONTINUED
Start: 2021-05-26 | End: 2021-05-26 | Stop reason: HOSPADM

## 2021-05-26 RX ORDER — SIMETHICONE 40MG/0.6ML
40 SUSPENSION, DROPS(FINAL DOSAGE FORM)(ML) ORAL EVERY 6 HOURS PRN
Status: DISCONTINUED | OUTPATIENT
Start: 2021-05-26 | End: 2021-06-05 | Stop reason: HOSPADM

## 2021-05-26 RX ORDER — ACETAMINOPHEN 325 MG/1
975 TABLET ORAL ONCE
Status: COMPLETED | OUTPATIENT
Start: 2021-05-26 | End: 2021-05-26

## 2021-05-26 RX ORDER — ACETAMINOPHEN 325 MG/1
650 TABLET ORAL EVERY 4 HOURS PRN
Status: DISCONTINUED | OUTPATIENT
Start: 2021-05-26 | End: 2021-06-05 | Stop reason: HOSPADM

## 2021-05-26 RX ORDER — BUSPIRONE HYDROCHLORIDE 10 MG/1
30 TABLET ORAL 2 TIMES DAILY
Status: DISCONTINUED | OUTPATIENT
Start: 2021-05-26 | End: 2021-06-05 | Stop reason: HOSPADM

## 2021-05-26 RX ORDER — SODIUM CHLORIDE 9 MG/ML
INJECTION, SOLUTION INTRAVENOUS CONTINUOUS
Status: DISCONTINUED | OUTPATIENT
Start: 2021-05-26 | End: 2021-05-27

## 2021-05-26 RX ORDER — QUETIAPINE FUMARATE 25 MG/1
50 TABLET, FILM COATED ORAL AT BEDTIME
Status: DISCONTINUED | OUTPATIENT
Start: 2021-05-26 | End: 2021-06-05 | Stop reason: HOSPADM

## 2021-05-26 RX ORDER — CEFTRIAXONE 1 G/1
1 INJECTION, POWDER, FOR SOLUTION INTRAMUSCULAR; INTRAVENOUS ONCE
Status: COMPLETED | OUTPATIENT
Start: 2021-05-26 | End: 2021-05-26

## 2021-05-26 RX ORDER — AMLODIPINE BESYLATE 5 MG/1
5 TABLET ORAL DAILY
DISCHARGE
Start: 2021-05-27

## 2021-05-26 RX ORDER — IOPAMIDOL 755 MG/ML
56 INJECTION, SOLUTION INTRAVASCULAR ONCE
Status: COMPLETED | OUTPATIENT
Start: 2021-05-26 | End: 2021-05-26

## 2021-05-26 RX ORDER — QUETIAPINE FUMARATE 25 MG/1
25 TABLET, FILM COATED ORAL 2 TIMES DAILY
Status: DISCONTINUED | OUTPATIENT
Start: 2021-05-26 | End: 2021-06-05 | Stop reason: HOSPADM

## 2021-05-26 RX ORDER — CALCIUM CARBONATE 500 MG/1
1000 TABLET, CHEWABLE ORAL 4 TIMES DAILY PRN
Status: DISCONTINUED | OUTPATIENT
Start: 2021-05-26 | End: 2021-06-05 | Stop reason: HOSPADM

## 2021-05-26 RX ORDER — CALCIUM POLYCARBOPHIL 625 MG 625 MG/1
1 TABLET ORAL DAILY
Status: ON HOLD | DISCHARGE
Start: 2021-05-27 | End: 2022-04-18

## 2021-05-26 RX ADMIN — ACETAMINOPHEN 650 MG: 325 TABLET, FILM COATED ORAL at 22:11

## 2021-05-26 RX ADMIN — ENOXAPARIN SODIUM 40 MG: 40 INJECTION SUBCUTANEOUS at 21:39

## 2021-05-26 RX ADMIN — BUSPIRONE HYDROCHLORIDE 30 MG: 10 TABLET ORAL at 09:28

## 2021-05-26 RX ADMIN — QUETIAPINE 50 MG: 50 TABLET ORAL at 21:40

## 2021-05-26 RX ADMIN — LEVETIRACETAM 1250 MG: 750 TABLET, FILM COATED ORAL at 21:39

## 2021-05-26 RX ADMIN — AMLODIPINE BESYLATE 5 MG: 5 TABLET ORAL at 09:28

## 2021-05-26 RX ADMIN — ACETAMINOPHEN 975 MG: 325 TABLET, FILM COATED ORAL at 13:52

## 2021-05-26 RX ADMIN — SODIUM CHLORIDE 80 ML: 9 INJECTION, SOLUTION INTRAVENOUS at 11:02

## 2021-05-26 RX ADMIN — BUSPIRONE HYDROCHLORIDE 30 MG: 10 TABLET ORAL at 21:39

## 2021-05-26 RX ADMIN — LEVETIRACETAM 1500 MG: 15 INJECTION INTRAVENOUS at 12:54

## 2021-05-26 RX ADMIN — POTASSIUM CHLORIDE 20 MEQ: 750 TABLET, EXTENDED RELEASE ORAL at 09:28

## 2021-05-26 RX ADMIN — FLUOXETINE 60 MG: 20 CAPSULE ORAL at 09:27

## 2021-05-26 RX ADMIN — LINACLOTIDE 290 MCG: 145 CAPSULE, GELATIN COATED ORAL at 06:32

## 2021-05-26 RX ADMIN — QUETIAPINE FUMARATE 25 MG: 25 TABLET ORAL at 09:28

## 2021-05-26 RX ADMIN — SULFAMETHOXAZOLE AND TRIMETHOPRIM 1 TABLET: 400; 80 TABLET ORAL at 21:40

## 2021-05-26 RX ADMIN — PANTOPRAZOLE SODIUM 40 MG: 40 TABLET, DELAYED RELEASE ORAL at 06:33

## 2021-05-26 RX ADMIN — IOPAMIDOL 75 ML: 755 INJECTION, SOLUTION INTRAVENOUS at 11:00

## 2021-05-26 RX ADMIN — DOXEPIN HYDROCHLORIDE 3 MG: 10 SOLUTION ORAL at 23:01

## 2021-05-26 RX ADMIN — SODIUM CHLORIDE 1000 ML: 9 INJECTION, SOLUTION INTRAVENOUS at 13:20

## 2021-05-26 RX ADMIN — CALCIUM POLYCARBOPHIL 625 MG: 625 TABLET, FILM COATED ORAL at 09:27

## 2021-05-26 RX ADMIN — CEFTRIAXONE 1 G: 1 INJECTION, POWDER, FOR SOLUTION INTRAMUSCULAR; INTRAVENOUS at 15:59

## 2021-05-26 RX ADMIN — FUROSEMIDE 20 MG: 20 TABLET ORAL at 09:27

## 2021-05-26 RX ADMIN — IOPAMIDOL 56 ML: 755 INJECTION, SOLUTION INTRAVENOUS at 13:20

## 2021-05-26 RX ADMIN — LEVETIRACETAM 750 MG: 750 TABLET, FILM COATED ORAL at 09:27

## 2021-05-26 RX ADMIN — SULFAMETHOXAZOLE AND TRIMETHOPRIM 1 TABLET: 400; 80 TABLET ORAL at 09:28

## 2021-05-26 RX ADMIN — DEXAMETHASONE 2 MG: 2 TABLET ORAL at 21:40

## 2021-05-26 RX ADMIN — SODIUM CHLORIDE: 9 INJECTION, SOLUTION INTRAVENOUS at 21:40

## 2021-05-26 RX ADMIN — LORAZEPAM 0.5 MG: 0.5 TABLET ORAL at 22:11

## 2021-05-26 ASSESSMENT — ENCOUNTER SYMPTOMS
ABDOMINAL PAIN: 0
WEAKNESS: 0
VOMITING: 0
NAUSEA: 0
HEADACHES: 1
FEVER: 0
SPEECH DIFFICULTY: 1
BACK PAIN: 1

## 2021-05-26 ASSESSMENT — ACTIVITIES OF DAILY LIVING (ADL)
COMMUNICATION: DIFFICULTY SPEAKING
DIFFICULTY_EATING/SWALLOWING: YES
DIFFICULTY_COMMUNICATING: YES
EATING/SWALLOWING: SWALLOWING LIQUIDS;SWALLOWING SOLID FOOD
WEAR_GLASSES_OR_BLIND: NO
CONCENTRATING,_REMEMBERING_OR_MAKING_DECISIONS_DIFFICULTY: YES
NUMBER_OF_TIMES_PATIENT_HAS_FALLEN_WITHIN_LAST_SIX_MONTHS: 0
TOILETING_ISSUES: NO
WHICH_OF_THE_ABOVE_FUNCTIONAL_RISKS_HAD_A_RECENT_ONSET_OR_CHANGE?: COGNITION
HEARING_DIFFICULTY_OR_DEAF: NO
TOILETING_ASSISTANCE: TOILETING DIFFICULTY, REQUIRES EQUIPMENT;TOILETING DIFFICULTY, ASSISTANCE 1 PERSON
EQUIPMENT_CURRENTLY_USED_AT_HOME: WALKER, ROLLING
WALKING_OR_CLIMBING_STAIRS: AMBULATION DIFFICULTY, ASSISTANCE 1 PERSON;AMBULATION DIFFICULTY, REQUIRES EQUIPMENT
DRESSING/BATHING_DIFFICULTY: NO
FALL_HISTORY_WITHIN_LAST_SIX_MONTHS: NO
DOING_ERRANDS_INDEPENDENTLY_DIFFICULTY: NO
DRESSING/BATHING: BATHING DIFFICULTY, REQUIRES EQUIPMENT;BATHING DIFFICULTY, ASSISTANCE 1 PERSON
WALKING_OR_CLIMBING_STAIRS_DIFFICULTY: YES

## 2021-05-26 NOTE — PLAN OF CARE
Occupational Therapy Discharge Summary    Reason for therapy discharge:    Discharged to acute care.  Change in medical status.    Progress towards therapy goal(s). See goals on Care Plan in [facility] electronic health record for goal details.  Goals not met.  Barriers to achieving goals:   discharge from facility.    Therapy recommendation(s):    Continued therapy is recommended.  Rationale/Recommendations:  Pt was variable yet making slow but steady progress.  She is a good candidate for skilled tx to address strength, ADLs, cognition and mobility based on participation, progress and she is performing below her baseline with to goal of discharging to dtr's home modified IND.

## 2021-05-26 NOTE — ED NOTES
Bed: ED07  Expected date:   Expected time:   Means of arrival:   Comments:  [facility] [address] transfer

## 2021-05-26 NOTE — PLAN OF CARE
Pt is alert, was disoriented to time on assessment. Thought February was the month. Denies shortness of breath/chest pain. Using purewick. Incontinent of small bm tonight. Also using bedpan. Denies pain. DD2 diet with nectar liquids. Continue POC.      Patient's most recent vital signs are:     Vital signs:  BP: 119/65  Temp: 98.7  HR: 96  RR: 18  SpO2: 96 %     Patient does not have new respiratory symptoms.  Patient does not have new sore throat.  Patient does not have a fever greater than 99.5.
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
## 2021-05-26 NOTE — PHARMACY-CONSULT NOTE
Pharmacy Stroke Code Response  Pharmacist responded as part of the Stroke Code Team activation to patient care area 5/26/21 1030.  The Stroke Team determined that the patient was not a candidate for IV thrombolytic therapy and the pharmacy team was dismissed at 5/26/21 1135.

## 2021-05-26 NOTE — ED TRIAGE NOTES
[place] [facility] [place]. VSS en route. Pt speaking with EMS fine. Ila negative.

## 2021-05-26 NOTE — PLAN OF CARE
Discharge Planner Post-Acute Rehab SLP:      Discharge Plan: home with assist , further SLP pending progress     Precautions:fall, swallowing     Current Status:  Communication: Moderate- higher level language deficits.  Cognition: Moderate cognitive deficits - attention, memory, reasoning/problem solving.  Swallow: NDD2 diet, nectar fluids (textures advanced 5/19/2021). VFSS 4/28 and 5/21/21Noting silent aspiration during/after with thin fluid by cup, recommend continue nectar fluids, NDD2 diet, sit up for po, small bites and sips slow pace, Monitor for sx aspiration (note pt silent aspiration on thin).       Assessment: Patient seen from 8 45-9:15.  Patient in normal state of health and was able to tolerate current NDD-2 food textures without any overt signs and symptoms of aspiration or difficulties.  1 episode of minor throat clear when patient was distracted but throughout the remainder the meal despite conversational distractions was able to tolerate without difficulties.  Patient also tolerating nectar thick liquids without overt signs and symptoms of aspiration.  Discussed potential repeat of VFSS next week pending progress.  Patient okay to have thin milk to use as creamer for her coffee.  Patient with good insight into her difficulties and just using as a creamer and not as a full beverage.  At time of SLP departure at approximately 915, patient in normal state of health.       .    Other Barriers to Discharge (Family Training, etc): TBD            Revision History
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
## 2021-05-26 NOTE — PLAN OF CARE
VS: VSS with exception of  and resp 24 which triggered sepsis protocol. MD assessed at bedside, did manual HR at 90. Provider put in EKG orders. Pt denied of SOB, chest pain, headache and blurry vision. Lactate 1.9   O2: Sat 95% RA    Output: Can be incontinent, utilizing Purewick when in bed, removed for therapy this am   Last BM: 05/25 per report    Activity: Max assist of two w/ walker and gait belt    Skin: KENYON    Pain: Denies    CMS: Denies of numbness/tingling    Dressing: PIV dressing to RUE    Diet: DD2, nectar thick    LDA: RUE PIV   Equipment: Walker, gait belt and wheelchair    Plan: Pt transferred to CT then will go to ED    Additional Info: Pt was alert and oriented x4 this am, following commands, smiling/laughing, talking to daughter and writer when sepsis protocol was triggered - asymptomatic and denied of changes from baseline.     1030 therapy cld writer in as pt started to decline during therapy. Writer assessed and pt was not responding to commands; cld Charge and initiated RR. Pt started leaning to right side and initiated Stroke Code. . New PIV to RUE. Pt transferred to CT then will go to ED, per MD orders. Pt left unit at 1100.             Patient's most recent vital signs are:     Vital signs:  BP: 120/82  Temp: 98.3  HR: 90[manual by MD[  RR: 24  SpO2: 95 %     Patient does not have new respiratory symptoms.  Patient does not have new sore throat.  Patient does not have a fever greater than 99.5.

## 2021-05-26 NOTE — PROGRESS NOTES
[facility]      Stroke Code    Patient Name: Olga Bailey  : 1945 MRN#: [id number]    STROKE DATA     Stroke Code:  Time called:  2021 1030  Time patient seen:  2021 1040  Onset of symptoms:  2021 1015  Last known normal (pt's baseline):  2021 0950  Head CT read by Dr. Dirk Tsai at:  2021 1120  Stroke Code de-escalated at 2021 1121 after discussion with Dr. Tsai due to symptoms not likely caused by stroke.      TPA treatment:  Not given due to stroke mimic: Thought to be having a seizure, no occlusion seen on CTA.    National Institutes [employer] Stroke Scale (at presentation)  NIHSS done at:  time patient seen      Score    Level of consciousness:  (0)   Alert, keenly responsive    LOC questions:  (2)   Answers neither question correctly    LOC commands:  (0)   Performs both tasks correctly    Best gaze:  (0)   Normal    Visual:  (0)   No visual loss    Facial palsy:  (0)   Normal symmetrical movements    Motor arm (left):  (0)   No drift    Motor arm (right):  (0)   No drift    Motor leg (left):  (2)   Some effort against gravity    Motor leg (right):  (2)   Some effort against gravity    Limb ataxia:  (0)   Absent    Sensory:  (0)   Normal- no sensory loss    Best language:  (3)   Mute- global aphasia    Dysarthria:  UN Intubated or other physical barrier: aphasic    Extinction and inattention:  (1)   Visual, tacile, auditory, spatial, person inattention        NIHSS Total Score:  10           ASSESSMENT & RECOMMENDATONS     Stroke code activated due to Nurse concerned that patient is suddenly weak on right side and now aphasic.    Recommendations:   Primary hospitalist at bedside and on the phone with the neuro-critical care fellow.  - Obtain CTA of head and neck  - After discussion with Dr. Tsai, head/neck CTA without occlusion, will de-escalate the stroke code.  - Transfer to [place] for further workup    The patient  was discussed with the Fellow, Dr. Tsai.  The staff is Dr. Scott.    Megan Sloan NP   Pager: [phone]

## 2021-05-26 NOTE — PLAN OF CARE
PT: During PT session pt's behavior quickly started to change, slumped posture, closing eyes and minimally responding to writer though pt able to verbalize feeling tired, brought back to room and immediately called RN who came to assess, max to total A of 2 to transport to bed, noted R lateral lean sitting EOB, Rapid response called and eventual stroke code. -10 minutes

## 2021-05-26 NOTE — PLAN OF CARE
Pt.sleeping well throughout shift. Has no c/o's or requests as of yet. Purewick intact/patent. LBM = small incont.yesterday. Radiation pickup @ 1330 via wheelchair.        Patient's most recent vital signs are:     Vital signs:  BP: 119/65  Temp: 98.7  HR: 96  RR: 18  SpO2: 96 %     Patient does not have new respiratory symptoms.  Patient does not have new sore throat.  Patient does not have a fever greater than 99.5.

## 2021-05-26 NOTE — PLAN OF CARE
Physical Therapy Discharge Summary    Reason for therapy discharge:    Discharged to hospital with change in medical status.  5/26 late AM    Progress towards therapy goal(s). See goals on Care Plan in [facility] electronic health record for goal details.  Goals not met.  Barriers to achieving goals:   discharge from facility.    Therapy recommendation(s):    Continued therapy is recommended.  Rationale/Recommendations:  progress transfers/functional mobility, strength.

## 2021-05-26 NOTE — DISCHARGE SUMMARY
[facility]  Hospitalist Discharge Summary      Date of Admission:  5/15/2021  Date of Discharge:  No discharge date for patient encounter.  Discharging Provider: Oliver Cheng MD      Discharge Diagnoses   Glioblastoma    Follow-ups Needed After Discharge   transferring to [place]     Unresulted Labs Ordered in the Past 30 Days of this Admission     No orders found from 4/15/2021 to 5/16/2021.        Discharge Disposition       Hospital Course     Olga Bailey is a 75 y.o. F w/ GBM s/p resection (Feb 2021), depression/anxiety, HTN, asthma, and GERD, who was transferred to [facility] for consideration of inpt rad/onc treatment while she continues to be treated for complex presentation of acute hypoxic respiratory failure (due to possible PJP pneumonia and/or TRALI), 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 Stroke code called on 5/11, suspect seizure and being in post-ictal state.  Transferred to TCU 5/15/2021 for ongoing rehab needs.     TDAY'S EVENTS  She has been receiving cranial radiation therapy, on 5/26 code stroke was called  Status.  Exam is alert she is not communicating feeding she is slightly dense which she has positive pronator drift on the right leg weaker then left leg.  She is not tracking my finger. CT head wo contrast and CTA head and neck ordered, ICU [facility] team present in room, Other differential is subclinical seizure. She had already received AM dose of keppra. She needs a thorough neuro eval, possibly a EEG and neuro consult.   I have notified patient placement and [place] physician. ICU team will follow up on CT scans and communicate with stroke team as well . Today AM when I saw her prior to stroke code she was doing well, chatting with her daughter on phone and was able to communicate. Sepsis protocol was triggered, LA 1.9, HR was 128 but blood pressure was 128/80. Finger stick was 120.     Below is the hospital course prior to  admission to TCU.            #Acute hypoxic respiratory failure   #Bilateral Pneumonia; presumed PJP s/p ABx treatment  #Concern for transfusion-related acute lung injury (TRALI)  #Concern for R hemidiaphragm paralysis, possible volume overload  Resp status stable  Pt remains on single strength  Bactrim daily until 1.5 month following end of steroids. She remains on low dose lasix (20 mg daily) for possible component of volume overload  Plan continue current tx, monitor BMP     Dysphagia,  Unclear if related to generalized weakness  DD2 diet, nectar thick liquids  Speech Path is following     Glioblastoma Multiforme  Left frontoparietal brain glioblastoma status post resection 2/23/2021. Seen by radiation oncology 3/24 recommended XRT and chemo radiation. However subsequently admitted for resp failure. MRI brain here signs of possible disease recurrence. Hem/onc concerned that she might not be a candidate for chemotherapy or radiation currently due to poor performance status.  - Radiation course initiated as inpatient 5/4 for 15 sessions  -Heme/onc consulted,  not a candidate for chemo due to poor functional status   would consider temozolomide may in June. She saw neurology in clinic on 5/25, they are happy with her improvement and advised to continue PT/OT and if she continues to improve she will be started on chemo in June.  - Levetiracetam 750mg BID  - Dexamethasone course completed.      #Acute Encephalopathy,   #Seizure,   Patient developed sudden change in mental status on 5/11 where there was decreased responsiveness, increased droop in right eye. Patient was seen 1 hr prior to event, no trauma. Concern immediately for stroke versus seizure. Stroke code called after rapid response. CTA negative for acute bleed or vascular stenosis/obstruction. Neurology suspected post-ictal state following seizure. After increase dosage of levetiracetam, no additional seizure activity has been observed.  Mental status has  been stable  - Follow up with Dr. Baker   - Levetiracetam 750 mg BID      #Major depressive disorder, recurrent  #Anxiety  Follows with psychiatry and psychology as outpatint.  Saw health psychology during admission for GBM, made plan for outpatient follow-up and med adjustment which she was unable to complete. While inpatient, she met with health psychology and they recommended to continue health psychology while at [facility].  - Seroquel 25 mg BID + 50 mg at bedtime (see Dr Madison from psychiatry note)  - Ativan 0.5 mg q4h PRN  - Scheduled doxepin PRN at night for sleep  - c/t PTA Buspar  - PTA prozac (dose reduced from 80 to 60 mg)     #Bilateral lower extremity DVT  #Right retroperitoneal hematoma   On 3/29 at outside hospital patient's O2 needs increased, duplex ultrasound revealed bilateral lower extremity DVT. CT PE negative for embolism.  Treated with IV heparin and transitioned to Lovenox 70 mg.  On 4/14 this was complicated by retroperitoneal bleed, requiring 4 units of packed red blood cells.  Lovenox was held, and IVC filter was placed on 4/16. Vascular surgery was involved, and a CT abdomen was stable bleed so no surgical intervention was required.  Eventually resumed on prophylactic 40mg of Lovenox twice daily.  -Continue 40 mg Lovenox qday  LLE U/S 5/8--neg for acute DVT     #Ileus, resolved  Began after bleed at outside hospital 4/14; was on TPN for nutrition.    Bowel movements are now regular on current regimen of prune juice and Fibercon  Plan monitor bowel status        #Hypertension  Stable on amlodipine  Plan monitor BP     #Steroid-induced hyperglycemia   Intermittently requiring insulin while on prednisone and on dexamethasone  BG nl   Plan No further monitoring            Consultations This Hospital Stay   PHYSICAL THERAPY ADULT IP CONSULT  OCCUPATIONAL THERAPY ADULT IP CONSULT  SPEECH LANGUAGE PATH ADULT IP CONSULT  NUTRITION SERVICES ADULT IP CONSULT    Code Status   Full Code    Time Spent on  this Encounter   I, Oliver Cheng MD, personally saw the patient today and spent greater than 30 minutes discharging this patient.       Oliver Cheng MD  [facility] [place] [address] [address] [address]  Phone: [phone]  ______________________________________________________________________    Physical Exam   Vital Signs: Temp: 98.3  F (36.8  C) Temp src: Oral BP: 120/82 Pulse: 90(manual by MD)   Resp: 24 SpO2: 95 % O2 Device: None (Room air)    Weight: 154 lbs 0 oz  General Appearance: NAD, Following commands  Respiratory: CTAB, No Whee  Cardiovascular: Irregular rhythm, S1S2,   GI: soft, NT, ND  Neuro; Alert, not responding to commands, not following finger, no facial droop or nasolabial fold flattening. Power Left Arm and Leg 3/5, power right arm 2/5, right leg 2/5. Unable to differentiate finger touch on legs. Right pronator drift ++        Primary Care Physician   [facility]    Discharge Orders      Home care nursing referral      Home Care PT Referral for Hospital Discharge      Home Care OT Referral for Hospital Discharge      MD face to face encounter    Documentation of Face to Face and Certification for Home Health Services    I certify that patient: Olga Bailey is under my care and that I, or a nurse practitioner or physician's assistant working with me, had a face-to-face encounter that meets the physician face-to-face encounter requirements with this patient on: 5/18/2021    This encounter with the patient was in whole, or in part, for the following medical condition, which is the primary reason for home health care: Acute respiratory failure    I certify that, based on my findings, the following services are medically necessary home health services:   PT for home safety and endurance  OT for I ADLs  Skilled nursing for medication management  HHA for assistance with bathing and grooming        Further, I certify that my clinical findings  support that this patient is homebound (i.e. absences from home require considerable and taxing effort and are for medical reasons or Confucianism services or infrequently or of short duration when for other reasons) because:  Pt is dyspneic with minimal exertion, requires the assistance of one other person with ambulation        Based on the above findings. I certify that this patient is confined to the home and needs intermittent skilled nursing care, physical therapy and/or speech therapy.  The patient is under my care, and I have initiated the establishment of the plan of care.  This patient will be followed by a physician who will periodically review the plan of care.  Physician/Provider to provide follow up care: Enrique Puga    Attending hospital physician (the Medicare certified [place] provider): Srikanth Swanson MD  Physician Signature: See electronic signature associated with these discharge orders.  Date: 5/18/2021     Reason for your hospital stay    Pneumonia, weakness     MD face to face encounter    Documentation of Face to Face and Certification for Home Health Services    I certify that patient: Olga Bailey is under my care and that I, or a nurse practitioner or physician's assistant working with me, had a face-to-face encounter that meets the physician face-to-face encounter requirements with this patient on: 5/19/2021.    This encounter with the patient was in whole, or in part, for the following medical condition, which is the primary reason for home health care: pneumonia, brain tumor    I certify that, based on my findings, the following services are medically necessary home health services:   PT for home safety and endurance  OT for iADLs  HHA for assistance with bathing and grooming  Skilled nursing for medication management        Further, I certify that my clinical findings support that this patient is homebound (i.e. absences from home require considerable and taxing effort  and are for medical reasons or Religion services or infrequently or of short duration when for other reasons) because: Pt has dyspnea, severe weakness with transferring and walking, requires the assistance of walker and at least one person to ambulate.    Based on the above findings. I certify that this patient is confined to the home and needs intermittent skilled nursing care, physical therapy and/or speech therapy.  The patient is under my care, and I have initiated the establishment of the plan of care.  This patient will be followed by a physician who will periodically review the plan of care.  Physician/Provider to provide follow up care: Enrique Puga    Attending [facility] physician (the Medicare certified [place] provider): Srikanth Swanson MD  Physician Signature: See electronic signature associated with these discharge orders.  Date: 5/19/2021       Significant Results and Procedures   Most Recent 3 CBC's:  Recent Labs   Lab Test 05/24/21  0639 05/21/21  0659 05/18/21  0630 05/16/21  0657 05/15/21  0505 05/14/21  0340   WBC  --   --   --  7.6 7.8 7.5   HGB 10.7*  --   --  10.5* 9.5* 8.9*   MCV  --   --   --  95 98 97    188 237 242 244 232     Most Recent 3 BMP's:  Recent Labs   Lab Test 05/24/21  0639 05/22/21  0620 05/21/21  0659 05/18/21  0630     --  141 143   POTASSIUM 3.8 4.6 3.0* 3.5   CHLORIDE 107  --  108 109   CO2 23  --  27 26   BUN 18  --  18 27   CR 0.54  --  0.50* 0.54   ANIONGAP 10  --  6 8   ESTIVEN 8.6  --  8.5 8.7   GLC 89  --  94 81     Most Recent 2 LFT's:  Recent Labs   Lab Test 05/15/21  0505 05/14/21  0340   AST 18 13   ALT 31 32   ALKPHOS 79 84   BILITOTAL 0.3 0.3   ,   Results for orders placed or performed during the hospital encounter of 05/15/21   XR Video Swallow with SLP or OT    Narrative    Examination:  Modified Barium Swallow Study with Speech Pathology,  5/21/2020    Comparison: 4/28/2021    History: Dysphasia    Fluoroscopy time: 2.12  minute(s).    Findings: Under fluoroscopic guidance, the patient was given orally  administered barium of varying consistencies in the presence of the  speech pathology service.      The oral phase was normal. Premature spillage. Aspiration of thin  liquid barium. Penetration with nectar consistency barium. No  penetration or aspiration of honey or solid consistencies.       Impression    Impression:   1. Aspiration of thin liquid barium.  2. Penetration with nectar consistency barium.  3. No penetration or aspiration of honey or solid consistencies.    Please see the speech pathologist report for further details.    I have personally reviewed the examination and initial interpretation  and I agree with the findings.    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, unspecified part of lung (H)      albuterol (PROVENTIL) (2.5 MG/3ML) 0.083% neb solution Take 1 vial (2.5 mg) by nebulization every 4 hours as needed for wheezing or shortness of breath / dyspnea  Qty:      Associated Diagnoses: Hypoxia      bisacodyl (DULCOLAX) 10 MG suppository Place 1 suppository (10 mg) rectally daily as needed for constipation    Associated Diagnoses: Essential hypertension      busPIRone HCl (BUSPAR) 30 MG tablet Take 30 mg by mouth 2 times daily      carboxymethylcellulose PF (REFRESH PLUS) 0.5 % ophthalmic solution Place 1 drop into both eyes every hour as needed for dry eyes     Associated Diagnoses: Dry eyes      doxepin (SINEQUAN) 10 MG/ML (HIGH CONC) solution Take 0.3 mLs (3 mg) by mouth At Bedtime  Qty: 118 mL, Refills: 0    Associated Diagnoses: Adjustment disorder with depressed mood; Episode of recurrent major depressive disorder, unspecified depression episode severity (H); Anxiety      FLUoxetine (PROZAC) 20 MG capsule Take 3 capsules (60 mg) by mouth daily  Qty: 30 capsule, Refills: 0    Associated Diagnoses: Adjustment disorder with depressed mood; Episode of recurrent major depressive disorder, unspecified depression episode severity (H)      furosemide (LASIX) 40 MG tablet Take 1 tablet (40 mg) by mouth daily  Qty: 30 tablet, Refills: 0    Associated Diagnoses: Hypoxia; Acute respiratory failure with hypoxia (H)      insulin aspart (NOVOLOG VIAL) 100 UNITS/ML vial Novolog Flexpen  If Pre-meal Glucose  140 -164 give 1 unit  165 -189 give 2 units  190 -214 give 3 units  215 -239 give 4 units  240 -264 give 5 units  265 -289 give 6 units  290 -314 give 7 units  315 -339 give 8 units  340+ give 9 units    If Bedtime Glucose  200 -214 give 1 unit  215 -264 give 2 units  265 -314 give 3 units  315+ give 4 units  Qty: 10 mL, Refills: 0    Associated Diagnoses: Steroid-induced hyperglycemia      levETIRAcetam (KEPPRA) 750 MG tablet Take 1 tablet (750 mg) by mouth 2 times daily  Qty: 60 tablet, Refills: 0    Associated Diagnoses: Glioblastoma (H)      linaclotide (LINZESS) 290 MCG capsule Take 1 capsule (290 mcg) by mouth every morning (before breakfast)    Associated Diagnoses: Other impaction of intestine (H)      LORazepam (ATIVAN) 0.5 MG tablet Take 1 tablet (0.5 mg) by mouth every 4 hours as needed for anxiety  Qty: 12 tablet, Refills: 0    Associated Diagnoses: Anxiety      melatonin 3 MG tablet Take 2 tablets (6 mg) by mouth nightly as needed for sleep  Qty: 30 tablet, Refills: 0    Associated Diagnoses: Anxiety      mesalamine (CANASA) 1000 MG suppository Place 1 suppository  (1,000 mg) rectally At Bedtime    Associated Diagnoses: Other impaction of intestine (H)      !! oxyCODONE (ROXICODONE) 5 MG tablet Take 0.5 tablets (2.5 mg) by mouth 3 times daily  Qty: 10 tablet, Refills: 0    Associated Diagnoses: Chronic obstructive pulmonary disease, unspecified COPD type (H); Hypoxia; Acute respiratory failure with hypoxia (H)      !! oxyCODONE (ROXICODONE) 5 MG tablet Take 0.5-1 tablets (2.5-5 mg) by mouth every 4 hours as needed for moderate to severe pain  Qty: 10 tablet, Refills: 0    Associated Diagnoses: Chronic obstructive pulmonary disease, unspecified COPD type (H); Hypoxia; Acute respiratory failure with hypoxia (H)      pantoprazole (PROTONIX) 40 MG EC tablet Take 1 tablet (40 mg) by mouth 2 times daily (before meals)  Qty: 30 tablet, Refills: 0    Associated Diagnoses: Gastroesophageal reflux disease without esophagitis      polyethylene glycol (MIRALAX) 17 GM/Dose powder Take 17 g by mouth daily as needed for constipation  Qty: 510 g, Refills: 0    Associated Diagnoses: Constipation, unspecified constipation type      !! QUEtiapine (SEROQUEL) 25 MG tablet Take 1 tablet (25 mg) by mouth 2 times daily  Qty: 30 tablet, Refills: 0    Associated Diagnoses: Adjustment disorder with depressed mood; Episode of recurrent major depressive disorder, unspecified depression episode severity (H)      !! QUEtiapine (SEROQUEL) 50 MG tablet Take 1 tablet (50 mg) by mouth At Bedtime  Qty: 30 tablet, Refills: 0    Associated Diagnoses: Adjustment disorder with depressed mood; Episode of recurrent major depressive disorder, unspecified depression episode severity (H)      sennosides (SENOKOT) 8.6 MG tablet Take 1 tablet by mouth 2 times daily as needed for constipation  Qty: 30 tablet, Refills: 0    Associated Diagnoses: Constipation, unspecified constipation type      simethicone (MYLICON) 40 MG/0.6ML suspension Take 0.6 mLs (40 mg) by mouth every 6 hours as needed for cramping    Associated  Diagnoses: Other impaction of intestine (H)      sulfamethoxazole-trimethoprim (BACTRIM) 400-80 MG tablet Take 1 tablet by mouth daily  Qty: 45 tablet, Refills: 0    Associated Diagnoses: 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 - Potential duplicate medications found. Please discuss with provider.        Allergies   Allergies   Allergen Reactions     Bacitracin Rash     Bactroban is effective; No difficulties     Erythromycin      intolerant.     Meperidine Hcl      intolerant only   Demerol     Chloraprep One Step Rash

## 2021-05-27 ENCOUNTER — OFFICE VISIT (OUTPATIENT)
Dept: RADIATION ONCOLOGY | Facility: CLINIC | Age: 76
End: 2021-05-27
Attending: STUDENT IN AN ORGANIZED HEALTH CARE EDUCATION/TRAINING PROGRAM
Payer: MEDICARE

## 2021-05-27 ENCOUNTER — APPOINTMENT (OUTPATIENT)
Dept: OCCUPATIONAL THERAPY | Facility: CLINIC | Age: 76
DRG: 054 | End: 2021-05-27
Payer: MEDICARE

## 2021-05-27 DIAGNOSIS — C71.9 GLIOBLASTOMA (H): Primary | ICD-10-CM

## 2021-05-27 LAB
ANION GAP SERPL CALCULATED.3IONS-SCNC: 5 MMOL/L (ref 3–14)
BACTERIA SPEC CULT: NORMAL
BUN SERPL-MCNC: 10 MG/DL (ref 7–30)
CALCIUM SERPL-MCNC: 6.7 MG/DL (ref 8.5–10.1)
CHLORIDE SERPL-SCNC: 119 MMOL/L (ref 94–109)
CO2 SERPL-SCNC: 21 MMOL/L (ref 20–32)
CREAT SERPL-MCNC: 0.38 MG/DL (ref 0.52–1.04)
ERYTHROCYTE [DISTWIDTH] IN BLOOD BY AUTOMATED COUNT: 17.5 % (ref 10–15)
GFR SERPL CREATININE-BSD FRML MDRD: >90 ML/MIN/{1.73_M2}
GLUCOSE BLDC GLUCOMTR-MCNC: 132 MG/DL (ref 70–99)
GLUCOSE SERPL-MCNC: 120 MG/DL (ref 70–99)
HCT VFR BLD AUTO: 26.7 % (ref 35–47)
HGB BLD-MCNC: 8.5 G/DL (ref 11.7–15.7)
Lab: NORMAL
MCH RBC QN AUTO: 30.5 PG (ref 26.5–33)
MCHC RBC AUTO-ENTMCNC: 31.8 G/DL (ref 31.5–36.5)
MCV RBC AUTO: 96 FL (ref 78–100)
PHOSPHATE SERPL-MCNC: 2.6 MG/DL (ref 2.5–4.5)
PLATELET # BLD AUTO: 154 10E9/L (ref 150–450)
POTASSIUM SERPL-SCNC: 3.2 MMOL/L (ref 3.4–5.3)
RBC # BLD AUTO: 2.79 10E12/L (ref 3.8–5.2)
SODIUM SERPL-SCNC: 145 MMOL/L (ref 133–144)
SPECIMEN SOURCE: NORMAL
WBC # BLD AUTO: 8.7 10E9/L (ref 4–11)

## 2021-05-27 PROCEDURE — 84100 ASSAY OF PHOSPHORUS: CPT | Performed by: STUDENT IN AN ORGANIZED HEALTH CARE EDUCATION/TRAINING PROGRAM

## 2021-05-27 PROCEDURE — 80048 BASIC METABOLIC PNL TOTAL CA: CPT | Performed by: STUDENT IN AN ORGANIZED HEALTH CARE EDUCATION/TRAINING PROGRAM

## 2021-05-27 PROCEDURE — 77336 RADIATION PHYSICS CONSULT: CPT | Performed by: STUDENT IN AN ORGANIZED HEALTH CARE EDUCATION/TRAINING PROGRAM

## 2021-05-27 PROCEDURE — 97165 OT EVAL LOW COMPLEX 30 MIN: CPT | Mod: GO

## 2021-05-27 PROCEDURE — 999N001017 HC STATISTIC GLUCOSE BY METER IP

## 2021-05-27 PROCEDURE — 250N000011 HC RX IP 250 OP 636: Performed by: STUDENT IN AN ORGANIZED HEALTH CARE EDUCATION/TRAINING PROGRAM

## 2021-05-27 PROCEDURE — 77427 RADIATION TX MANAGEMENT X5: CPT | Mod: GC | Performed by: STUDENT IN AN ORGANIZED HEALTH CARE EDUCATION/TRAINING PROGRAM

## 2021-05-27 PROCEDURE — 120N000002 HC R&B MED SURG/OB UMMC

## 2021-05-27 PROCEDURE — 99233 SBSQ HOSP IP/OBS HIGH 50: CPT | Mod: GC | Performed by: STUDENT IN AN ORGANIZED HEALTH CARE EDUCATION/TRAINING PROGRAM

## 2021-05-27 PROCEDURE — 999N000157 HC STATISTIC RCP TIME EA 10 MIN

## 2021-05-27 PROCEDURE — 250N000012 HC RX MED GY IP 250 OP 636 PS 637: Performed by: STUDENT IN AN ORGANIZED HEALTH CARE EDUCATION/TRAINING PROGRAM

## 2021-05-27 PROCEDURE — 85027 COMPLETE CBC AUTOMATED: CPT | Performed by: STUDENT IN AN ORGANIZED HEALTH CARE EDUCATION/TRAINING PROGRAM

## 2021-05-27 PROCEDURE — 97530 THERAPEUTIC ACTIVITIES: CPT | Mod: GO

## 2021-05-27 PROCEDURE — 258N000003 HC RX IP 258 OP 636: Performed by: STUDENT IN AN ORGANIZED HEALTH CARE EDUCATION/TRAINING PROGRAM

## 2021-05-27 PROCEDURE — 77386 HC IMRT TREATMENT DELIVERY, COMPLEX: CPT | Performed by: STUDENT IN AN ORGANIZED HEALTH CARE EDUCATION/TRAINING PROGRAM

## 2021-05-27 PROCEDURE — 250N000013 HC RX MED GY IP 250 OP 250 PS 637: Performed by: STUDENT IN AN ORGANIZED HEALTH CARE EDUCATION/TRAINING PROGRAM

## 2021-05-27 PROCEDURE — 36415 COLL VENOUS BLD VENIPUNCTURE: CPT | Performed by: STUDENT IN AN ORGANIZED HEALTH CARE EDUCATION/TRAINING PROGRAM

## 2021-05-27 RX ORDER — SODIUM CHLORIDE, SODIUM LACTATE, POTASSIUM CHLORIDE, CALCIUM CHLORIDE 600; 310; 30; 20 MG/100ML; MG/100ML; MG/100ML; MG/100ML
INJECTION, SOLUTION INTRAVENOUS CONTINUOUS
Status: DISCONTINUED | OUTPATIENT
Start: 2021-05-27 | End: 2021-05-28

## 2021-05-27 RX ORDER — SODIUM CHLORIDE, SODIUM LACTATE, POTASSIUM CHLORIDE, CALCIUM CHLORIDE 600; 310; 30; 20 MG/100ML; MG/100ML; MG/100ML; MG/100ML
INJECTION, SOLUTION INTRAVENOUS CONTINUOUS
Status: DISCONTINUED | OUTPATIENT
Start: 2021-05-27 | End: 2021-05-27

## 2021-05-27 RX ADMIN — SULFAMETHOXAZOLE AND TRIMETHOPRIM 1 TABLET: 400; 80 TABLET ORAL at 09:43

## 2021-05-27 RX ADMIN — PANTOPRAZOLE SODIUM 40 MG: 40 TABLET, DELAYED RELEASE ORAL at 16:30

## 2021-05-27 RX ADMIN — BUSPIRONE HYDROCHLORIDE 30 MG: 10 TABLET ORAL at 09:50

## 2021-05-27 RX ADMIN — CEFTRIAXONE 1 G: 1 INJECTION, POWDER, FOR SOLUTION INTRAMUSCULAR; INTRAVENOUS at 13:07

## 2021-05-27 RX ADMIN — PANTOPRAZOLE SODIUM 40 MG: 40 TABLET, DELAYED RELEASE ORAL at 09:39

## 2021-05-27 RX ADMIN — CALCIUM POLYCARBOPHIL 625 MG: 625 TABLET, FILM COATED ORAL at 09:39

## 2021-05-27 RX ADMIN — FLUOXETINE HYDROCHLORIDE 60 MG: 40 CAPSULE ORAL at 09:38

## 2021-05-27 RX ADMIN — QUETIAPINE 50 MG: 50 TABLET ORAL at 22:10

## 2021-05-27 RX ADMIN — DEXAMETHASONE 2 MG: 2 TABLET ORAL at 20:18

## 2021-05-27 RX ADMIN — QUETIAPINE FUMARATE 25 MG: 25 TABLET ORAL at 09:38

## 2021-05-27 RX ADMIN — FUROSEMIDE 40 MG: 20 TABLET ORAL at 09:40

## 2021-05-27 RX ADMIN — SODIUM CHLORIDE, POTASSIUM CHLORIDE, SODIUM LACTATE AND CALCIUM CHLORIDE: 600; 310; 30; 20 INJECTION, SOLUTION INTRAVENOUS at 15:03

## 2021-05-27 RX ADMIN — LEVETIRACETAM 1250 MG: 750 TABLET, FILM COATED ORAL at 09:39

## 2021-05-27 RX ADMIN — ENOXAPARIN SODIUM 40 MG: 40 INJECTION SUBCUTANEOUS at 22:11

## 2021-05-27 RX ADMIN — BUSPIRONE HYDROCHLORIDE 30 MG: 10 TABLET ORAL at 20:17

## 2021-05-27 RX ADMIN — AMLODIPINE BESYLATE 5 MG: 5 TABLET ORAL at 09:39

## 2021-05-27 RX ADMIN — QUETIAPINE FUMARATE 25 MG: 25 TABLET ORAL at 20:18

## 2021-05-27 RX ADMIN — DOXEPIN HYDROCHLORIDE 3 MG: 10 SOLUTION ORAL at 22:11

## 2021-05-27 RX ADMIN — DEXAMETHASONE 2 MG: 2 TABLET ORAL at 09:38

## 2021-05-27 RX ADMIN — LINACLOTIDE 290 MCG: 145 CAPSULE, GELATIN COATED ORAL at 09:43

## 2021-05-27 RX ADMIN — LEVETIRACETAM 1250 MG: 750 TABLET, FILM COATED ORAL at 20:17

## 2021-05-27 ASSESSMENT — VISUAL ACUITY
OU: BASELINE;GLASSES
OU: BASELINE;GLASSES

## 2021-05-27 ASSESSMENT — ACTIVITIES OF DAILY LIVING (ADL)
ADLS_ACUITY_SCORE: 19
PREVIOUS_RESPONSIBILITIES: MEAL PREP;HOUSEKEEPING;LAUNDRY;SHOPPING;MEDICATION MANAGEMENT;FINANCES;DRIVING
ADLS_ACUITY_SCORE: 22
ADLS_ACUITY_SCORE: 22
ADLS_ACUITY_SCORE: 19
ADLS_ACUITY_SCORE: 22
ADLS_ACUITY_SCORE: 22

## 2021-05-27 NOTE — UTILIZATION REVIEW
Inpatient appropriate    Admission Status; Secondary Review Determination      Under the authority of the Utilization Management Committee, the utilization review process indicated a secondary review on the above patient. The review outcome is based on review of the medical records, discussions with staff, and applying clinical experience noted on the date of the review.    (x) Inpatient Status Appropriate - This patient's medical care is consistent with medical management for inpatient care and reasonable inpatient medical practice.    RATIONALE FOR DETERMINATION  75 year old female who has a history of Glioblastoma s/p resection in 03/2021, anxiety, depression, and HTN and was admitted on 5/26/2021 after having a seizure at the TCU.  Patient was lethargic overnight.  She will need ongoing hospitalization and monitoring for recurrent seizure especially with her history of glioblastoma resection.  She is also being treated for UTI with IV Rocephin.  Inpatient care is appropriate at this time.    At the time of admission with the information available to the attending physician more than 2 nights Hospital complex care was anticipated, based on patient risk of adverse outcome if treated as outpatient and complex care required. Inpatient admission is appropriate based on the Medicare guidelines.    This document was produced using voice recognition software      The information on this document is developed by the utilization review team in order for the business office to ensure compliance. This only denotes the appropriateness of proper admission status and does not reflect the quality of care rendered.  The definitions of Inpatient Status and Observation Status used in making the determination above are those provided in the CMS Coverage Manual, Chapter 1 and Chapter 6, section 70.4.    Sincerely,    Utilization Review  Physician Advisor  [facility].

## 2021-05-27 NOTE — PROGRESS NOTES
"[facility] PHYSICIANS  SPECIALIZING IN BREAKTHROUGHS  Radiation Oncology    On Treatment Visit Note      Olga Bailey      Date: 2021   MRN: [id number]   : 1945         Reason for Visit:  On Radiation Treatment Visit     Treatment Summary to Date   Treatment Site: Brain   Current Dose: 4005/4005 cGy  Fractions: 15/15      Chemotherapy  Chemo concurrent with radx?: No     ED Visit/Hosiptal Admission: She is currently admitted inpatient s/p seizure.      Unplanned treatment Breaks: 2021. She had a seizure episode in TCU, prompting emergent hospitalization for workup. CT head yielded no acute findings or change. Neurology was consulted, and she was currently on Keppra and Decadron tapering (2 mg BID for 1 wk, 2 mg daily for 1 wk).    Subjective: Ms. Bailey had recurrent seizure episode overnight. She completed her last fraction of treatment earlier today. She is reached through video conference, and her son, Dario, and her daughter, La Nena were present during our discussion. She remains fatigued related to radiation and endorses generalized weakness from prolonged hospital stay. She has no other pressing symptoms or concerns.  She reports vision consistent with \"dirty glasses\" that has occurred throughout the duration of her hospital stay.      Objective:   There were no vitals taken for this visit.  Gen: Appears well, in no acute distress  Skin: No erythema    Labs:  CBC RESULTS:   Recent Labs   Lab Test 21  0627   WBC 8.7   RBC 2.79*   HGB 8.5*   HCT 26.7*   MCV 96   MCH 30.5   MCHC 31.8   RDW 17.5*        ELECTROLYTES:  Recent Labs   Lab Test 21  0627   *   POTASSIUM 3.2*   CHLORIDE 119*   ESTIVEN 6.7*   CO2 21   BUN 10   CR 0.38*   *     Assessment:    Tolerating radiation therapy well.  All questions and concerns addressed.    Toxicities:  Fatigue: Grade 1: Fatigue relieved by rest    Plan:   1. Follow up virtual visit with me in 1 month.  1. We " will check in on radiation related side effects and inquire about visual symptoms   2. Post-treatment brain MRI on 6/22/21 ordered by Dr. Baker with same day follow up.   3. Steroids taper. Prophylactic steroids are not needed for radiation therapy.     Mosaiq chart and setup information reviewed  MVCT/IGRT images checked    The patient was seen and discussed with staff, Dr. Spann.    Dave Rios MD  Resident, PGY-3  Department of Radiation Oncology  [facility]    A medical resident participated in the care of this patient and in the preparation of this note. I have verified and edited this note. I personally performed the virtual physical exam and medical decision making for this patient.  I agree with the assessment and plan of care as documented in the note above.    Betsy Spann MD, PhD     Department of Radiation Oncology  [facility]

## 2021-05-27 NOTE — PROGRESS NOTES
Vitals: VSS -weaned off of O2  Neuros: unchanged; d/o to exact date. Expresive aphasia, slow/whispered speech. 4/5 T/O  IV: LR @ 100  Labs/Electrolytes: K 3.2 - no replacement orders  Resp/trach: WNL  Diet: DD2 Nectar thick - Fair po. Takes pills whole in applesauce  Bowel status: BM 5/27  : Due to void - Scanned for 608 ml (will cath before shift end), 1515 Pt attempted to void again but only had loose BM. Will need to be straight cathed after IPad meeting with MD.   Skin: Blotchy/Bruising throughout  Pain: Denies  Activity: up with 2 GB to commode. Worked with therapy  Social: Son at bedside  Plan: IV antibiotics UTI  Updates this shift: Pt had radiation therapy today.

## 2021-05-27 NOTE — LETTER
"    2021         RE: Olga Bailey  [address] [place] [address]        Dear Colleague,    Thank you for referring your patient, Olga Bailey, to the [facility] RADIATION ONCOLOGY. Please see a copy of my visit note below.    [facility] PHYSICIANS  SPECIALIZING IN BREAKTHROUGHS  Radiation Oncology    On Treatment Visit Note      Olga Bailey      Date: 2021   MRN: [id number]   : 1945         Reason for Visit:  On Radiation Treatment Visit     Treatment Summary to Date   Treatment Site: Brain   Current Dose: 4005/4005 cGy  Fractions: 15/15      Chemotherapy  Chemo concurrent with radx?: No     ED Visit/Hosiptal Admission: She is currently admitted inpatient s/p seizure.      Unplanned treatment Breaks: 2021. She had a seizure episode in TCU, prompting emergent hospitalization for workup. CT head yielded no acute findings or change. Neurology was consulted, and she was currently on Keppra and Decadron tapering (2 mg BID for 1 wk, 2 mg daily for 1 wk).    Subjective: Ms. Bailey had recurrent seizure episode overnight. She completed her last fraction of treatment earlier today. She is reached through video conference, and her son, Dario, and her daughter, La Nena were present during our discussion. She remains fatigued related to radiation and endorses generalized weakness from prolonged hospital stay. She has no other pressing symptoms or concerns.  She reports vision consistent with \"dirty glasses\" that has occurred throughout the duration of her hospital stay.      Objective:   There were no vitals taken for this visit.  Gen: Appears well, in no acute distress  Skin: No erythema    Labs:  CBC RESULTS:   Recent Labs   Lab Test 21   WBC 8.7   RBC 2.79*   HGB 8.5*   HCT 26.7*   MCV 96   MCH 30.5   MCHC 31.8   RDW 17.5*        ELECTROLYTES:  Recent Labs   Lab Test 21   *   POTASSIUM 3.2*   CHLORIDE 119*   ESTIVEN " 6.7*   CO2 21   BUN 10   CR 0.38*   *     Assessment:    Tolerating radiation therapy well.  All questions and concerns addressed.    Toxicities:  Fatigue: Grade 1: Fatigue relieved by rest    Plan:   1. Follow up virtual visit with me in 1 month.  1. We will check in on radiation related side effects and inquire about visual symptoms   2. Post-treatment brain MRI on 6/22/21 ordered by Dr. Baker with same day follow up.   3. Steroids taper. Prophylactic steroids are not needed for radiation therapy.     yuilop SLiq chart and setup information reviewed  MVCT/IGRT images checked    The patient was seen and discussed with staff, Dr. Spann.    Dave Rios MD  Resident, PGY-3  Department of Radiation Oncology  [facility]    A medical resident participated in the care of this patient and in the preparation of this note. I have verified and edited this note. I personally performed the virtual physical exam and medical decision making for this patient.  I agree with the assessment and plan of care as documented in the note above.    Betsy Spann MD, PhD     Department of Radiation Oncology  [facility]

## 2021-05-27 NOTE — ED NOTES
Spoke with Dafne Rn at [facility] who stated that pt's belongings are present at the TCU on a cart.Called pt's son Dario and updated him on status of pt's belongings, per Dafne. Pt arrived to [facility] with blue T shirt.

## 2021-05-27 NOTE — PROGRESS NOTES
Arrived from:  ED  Belongings/meds:  With patient now, TCU brought them over   2 RN Skin Assessment Completed by:  Mikayla   Non-intact findings documented (yes/no/NA): Redness throughout. Blanching redness to coccyx, wound orders in place but unsure what for

## 2021-05-27 NOTE — PROGRESS NOTES
CLINICAL NUTRITION SERVICES - ASSESSMENT NOTE     Nutrition Prescription    RECOMMENDATIONS FOR MDs/PROVIDERS TO ORDER:  - Recommend ordering K+ replacement d/t hypokalemia     Malnutrition Status:    - Unable to determine at this time    Recommendations already ordered by Registered Dietitian (RD):  - Order lab add on to check PO4 for review  - Order calorie counts to help quantify po adequacy  - Order wt  - Enter prn order for ONS    Future/Additional Recommendations:  - Monitor po tolerance and adequacy of intakes per calorie count collection and need for additional intervention  - Monitor wt trends for maintenance, once current wt obtained     REASON FOR ASSESSMENT  Olga Bailey is a/an 75 year old female assessed by the dietitian for Provider Order - Malnutrition    NUTRITION HISTORY  Per chart review, pt transferred from TCU (had been admitted on 5/15 for ongoing rehab needs post stroke on 5/11) for evaluation of acute altered mental status changes, aphasia and righted-sided weakness.  Noted pt has also been followed by SLP for dysphagia as a result of VFSS 4/28 and 5/21/21, noting silent aspiration during/after with thin fluid by cup and recommendatoion made to continue NDD2 diet.with nectar fluids.  Per RD note on 5/26, reported that pt was recently meeting 1465 kcal (100% minimum energy needs) and 56 g protein (99% minimum protein needs) per calorie count collection from 5/17 thru 5/20.  Per chart review, pt previously required PN therapy (from 4/27 thru 5/6) due to ileus 2/2 GI bleed    CURRENT NUTRITION ORDERS  Diet: Dysphagia Level 2:  Mechanically Altered and Nectar Thickened Liquids   Intake/Tolerance: Fair per Breakfast this am. Reported that pt needs assistance with feeding.     LABS  NA++ 145 (high) - received IV bolus plus NS yesterday, but no MIVF's ordered at this time  K+ 3.2 (low); suspect d/t diuresis secondary to pt receiving lasix from 5/19 thru 5/26. No replacement ordered at this  "time.  Mg WNL on 5/26  PO4 WNL on 5/15  Cr 0.38 (low); suggestive of poor muscle mass    MEDICATIONS  Medications reviewed    ANTHROPOMETRICS  Height: 160 cm (5' 3\")  Most Recent Weight: No wt obtained on admit  IBW: 52 kg  BMI: Unable to determine   Weight History: Last recorded wt on 5/19 was 69.9 kg (154 lbs). Per RD note on 5/26, reported that pt had lost 10 lbs (6%) over the last ~1 month. Pt previously reported UBW of ~130 lbs, last weighing this ~8 months ago and commented that she didn't want to gain weight.  05/19/21 : 69.9 kg (154 lb)   05/15/21 : 70.3 kg (155 lb)  05/10/21 : 67.1 kg (147 lb 14.9 oz)  04/26/21 : 74.4 kg (164 lb 0.4 oz)  03/23/21 : 70 kg (154 lb 6.4 oz)  03/08/21 : 69.9 kg (154 lb)  02/26/21 : 74.4 kg (164 lb 1.6 oz)    Dosing Weight: 56 kg (adjusted based on last recorded wt of 69.9 kg on 5/19 and IBW)    ASSESSED NUTRITION NEEDS  Estimated Energy Needs: 8255-7925 kcals/day (25 - 30 kcals/kg)  Justification: Maintenance  Estimated Protein Needs: 67+grams protein/day (1.2+ grams of pro/kg)  Justification: Hypercatabolism with acute illness  Estimated Fluid Needs: (1 mL/kcal)   Justification: Maintenance    PHYSICAL FINDINGS  See malnutrition section below.    MALNUTRITION (limited d/t unable to see pt at this time)  % Intake: No decreased intake noted PTA per RD noted on yesterday  % Weight Loss: Unable to assess d/t no new wt since 5/19. However, pt with h/o > 5% in 1 month (severe)  Subcutaneous Fat Loss: Unable to assess  Muscle Loss: Unable to assess  Fluid Accumulation/Edema: None noted per chart review  Malnutrition Diagnosis: Unable to determine due to unable to complete all parameters of nutrition assessment.    NUTRITION DIAGNOSIS  Predicted inadequate nutrient intake (calorie-protein) related to previously eating well PTA with meeting baseline calorie and protein needs per RD note on 5/26, but has h/o dysphagia, although appropriate for NDD2 with nectar thickened liquids, but now " presenting with acute altered mental status that may hinder po adequacy.    INTERVENTIONS  Implementation  Nutrition Education: No education needs assessed at this time   Obtain wt   Initiate calorie counts  Medical food supplement therapy     Goals  1.  Ave po intakes per calorie counts x 3 days to meet at least 75% of pt's estimated nutritional needs (1050 calories and 50 g PRO)    Monitoring/Evaluation  Progress toward goals will be monitored and evaluated per protocol.    Usha Wooten RD,LD  6A/7D pager [phone]

## 2021-05-27 NOTE — PROGRESS NOTES
05/27/21 1500   Quick Adds   Type of Visit Initial Occupational Therapy Evaluation   Living Environment   People in home alone   Current Living Arrangements house   Home Accessibility stairs within home   Number of Stairs, Main Entrance 3   Number of Stairs, Within Home, Primary other (see comments)  (full flight to 2nd level, does not have to use)   Stair Railings, Within Home, Primary railing on left side (ascending)   Transportation Anticipated family or friend will provide   Living Environment Comments Pt lives alone in a house, reports she will likely be staying with her daughter for a while after discharge.    Self-Care   Usual Activity Tolerance good   Current Activity Tolerance poor   Regular Exercise Yes   Activity/Exercise Type walking   Exercise Amount/Frequency 3-5 times/wk   Equipment Currently Used at Home grab bar, toilet;grab bar, tub/shower;walker, rolling;shower chair;cane, straight   Activity/Exercise/Self-Care Comment Pt reports using AE for all mobility and ADLs at baseline, mod-I with AE.    Disability/Function   Hearing Difficulty or Deaf no   Wear Glasses or Blind yes   Vision Management glasses   Concentrating, Remembering or Making Decisions Difficulty yes   Difficulty Communicating yes   Difficulty Eating/Swallowing yes   Walking or Climbing Stairs Difficulty yes   Walking or Climbing Stairs ambulation difficulty, requires equipment   Mobility Management Pt has been using fww for ambulation.    Dressing/Bathing Difficulty yes   Dressing/Bathing bathing difficulty, requires equipment   Dressing/Bathing Management Pt uses a shower chair for bathing.    Toileting issues yes   Toileting Management grab bars   Toileting Assistance toileting difficulty, requires equipment   Doing Errands Independently Difficulty (such as shopping) yes   Errands Management family assists with errand management    Fall history within last six months no   Change in Functional Status Since Onset of Current  Illness/Injury yes   General Information   Onset of Illness/Injury or Date of Surgery 05/26/21   Referring Physician Marvel Mcdowell MD   Patient/Family Therapy Goal Statement (OT) return home, gain functional independence    Additional Occupational Profile Info/Pertinent History of Current Problem Per chart: Olga Bailey is a 75 year old female who has a history of Glioblastoma s/p resection in 03/2021, anxiety, depression, and HTN and is admitted after having a seizure at the TCU.   Performance Patterns (Routines, Roles, Habits) Pt has been at [facility] recently, working on basic mobility and ADLs. Pt is normally mod-I with all mobility and ADLs but has not been home in a while.    Existing Precautions/Restrictions fall;oxygen therapy device and L/min   Limitations/Impairments safety/cognitive   Left Upper Extremity (Weight-bearing Status) full weight-bearing (FWB)   Right Upper Extremity (Weight-bearing Status) full weight-bearing (FWB)   Left Lower Extremity (Weight-bearing Status) full weight-bearing (FWB)   Right Lower Extremity (Weight-bearing Status) full weight-bearing (FWB)   General Observations and Info Activity: up with assist   Cognitive Status Examination   Orientation Status orientation to person, place and time   Affect/Mental Status (Cognitive) low arousal/lethargic   Follows Commands WNL   Safety Deficit moderate deficit   Memory Deficit moderate deficit   Attention Deficit moderate deficit   Cognitive Status Comments Cognition deficits present, AxOx4, but able to follow all commands and give accurate PLOF, would likely benefit from further cognitive testing.    Visual Perception   Visual Impairment/Limitations corrective lenses full-time   Impact of Vision Impairment on Function (Vision) Peripheral vision deficits.    Sensory   Sensory Quick Adds No deficits were identified   Pain Assessment   Patient Currently in Pain No   Integumentary/Edema   Integumentary/Edema no deficits were  identifed   Posture   Posture forward head position;kyphosis;protracted shoulders   Range of Motion Comprehensive   Comment, General Range of Motion RUE limited to 60 degrees flexion, LUE up to 100 degrees.    Strength Comprehensive (MMT)   Comment, General Manual Muscle Testing (MMT) Assessment BUEs impaired, 2+/5   Coordination   Upper Extremity Coordination Right UE impaired   Gross Motor Coordination Impaired 2/2 weakness and balance deficits   Fine Motor Coordination Not tested    Bed Mobility   Comment (Bed Mobility) Andrés x1   Transfers   Transfer Comments Andrés x2 sit<>stands    Balance   Balance Comments Balance deficits present, falls risk.    Instrumental Activities of Daily Living (IADL)   Previous Responsibilities meal prep;housekeeping;laundry;shopping;medication management;finances;driving   IADL Comments Pt was independent with IADLs at baseline, has not performed any IADLs recently 2/2 being ill and functional decline.    Clinical Impression   Criteria for Skilled Therapeutic Interventions Met (OT) yes;skilled treatment is necessary   OT Diagnosis Decreased ADL-I   OT Problem List-Impairments impacting ADL problems related to;activity tolerance impaired;balance;cognition;coordination;mobility;range of motion (ROM);sensation;strength;postural control   Assessment of Occupational Performance 5 or more Performance Deficits   Identified Performance Deficits ambulation, transferring, toileting, bathing, dressing, home management, community mobility   Planned Therapy Interventions (OT) ADL retraining;cognition;neuromuscular re-education;strengthening;progressive activity/exercise;ROM   Clinical Decision Making Complexity (OT) low complexity   Therapy Frequency (OT) 5x/week   Predicted Duration of Therapy 1 week   Risk & Benefits of therapy have been explained evaluation/treatment results reviewed;care plan/treatment goals reviewed;risks/benefits reviewed;current/potential barriers reviewed;participants  voiced agreement with care plan;participants included;patient;son   OT Discharge Planning    OT Discharge Recommendation (DC Rec) Transitional Care Facility   OT Rationale for DC Rec Pt is below baseline level of function and would benefit from further therapy to progress basic independence with all mobility and ADLs prior to return home.    OT Brief overview of current status  Andrés x2 for sit<>stand transfers.    Total Evaluation Time (Minutes)   Total Evaluation Time (Minutes) 3

## 2021-05-27 NOTE — PROGRESS NOTES
[facility]    Progress Note - Maximilian 3 Service        Date of Admission:  5/26/2021    Assessment & Plan       Olga Bailey is a 75 year old female who has a history of Glioblastoma s/p resection in 03/2021, anxiety, depression, and HTN and is admitted after having a seizure at the TCU.     Seizure 2/2 GBM  Previous seizure on 5/11 during hospitalization. Has been on low dose Levetiracetam up to 750 mg BD at discharge. On arrival, patient was in post-ictal state. Neurology consulted, suspect seizure secondary to GBM. Previous EEG imaging demonstrated seizure foci at GBM lesion.  - Neurology consulted  - Levetiracetam 1250 mg BID  - AED level of keppra pending  - dexamethasone 2 mg bid x 7days, followed by 2 mg qday for 7 days  - omeprazole 20 mg qday for duration of dexamethasone treatment  - If another seizure occurs, give 300 mg Vimpat IV load and 100 mg Vimpat bid     Glioblastoma Multiforme  Left frontoparietal brain glioblastoma status post resection 2/23/2021. Seen by radiation oncology 3/24 recommended XRT and chemo radiation. However subsequently admitted for resp failure. Hem/onc concerned that she might not be a candidate for chemotherapy or radiation currently due to poor performance status. In June, will initiate temozolomide at adjuvant-dosing  - 1 month follow up with Dr. Baker  - Completed 15 sessions of radiation on 5/27     Acute hypoxic respiratory failure   Recent history of Presumed PJP Pneumonia  Continue supplemental O2 as needed, due to steroids will continue home dose of bactrim.  - Supplemental O2 PRN  - PTA bactrim     Urinary Retention  Uncomplicated UTI  Patient without dysuria and presented with right flank pain. UA in ED with leuk esterase and WBC elevated. Afebrile and CBC without leukocytosis. Awaiting culture results.Continues to have intermittent urinary retention with need for catheterization, if this continues will need mccray  placement.  - CTX for 5 days     Major depressive disorder, recurrent  Anxiety  Follows with psychiatry and psychology as outpatint.  Saw health psychology during admission for GBM. Seeing health psychology while at TCU.   - Seroquel 25 mg BID + 50 mg at bedtime  - Ativan 0.5 mg q4h PRN  - Scheduled doxepin PRN at night for sleep  - c/t PTA Buspar  - PTA prozac 60 mg     Bilateral lower extremity DVT  Right retroperitoneal hematoma   On 3/29 at outside hospital patient's O2 needs increased, duplex ultrasound revealed bilateral lower extremity DVT. CT PE negative for embolism.  Treated with IV heparin and transitioned to Lovenox 70 mg. On 4/14 this was complicated by retroperitoneal bleed, requiring 4 units of packed red blood cells.  Lovenox was held, and IVC filter was placed on 4/16. Vascular surgery was involved, and a CT abdomen was stable bleed so no surgical intervention was required.  Eventually resumed on prophylactic 40mg of Lovenox twice daily.  -Continue 40 mg Lovenox qday     Constipation  -Continue prior Bowel regimen: linaclotide, mesalamine, MiraLAX, simethicone PRN     Non-severe malnutrition  Previous admission required TPN and nutrition consulted.  - Nutrition consulted.     Hypertension  Started on metoprolol at OSH for sinus tachycardia.  - c/t PTA amlodipine 10 mg qday     Chronic medical problems:  - albuterol PRN  - GERD: back to PTA PO PPI     Diet: Combination Diet Dysphagia Diet Level 2: Mechan Altered; Nectar Thickened Liquids (pre-thickened or use instant food thickener)    Fluids: None  Lines: PIV  DVT Prophylaxis: Enoxaparin (Lovenox) SQ  Martino Catheter: not present  Code Status: Full Code           Disposition Plan   Expected discharge: 2 - 3 days, recommended to prior living arrangement once antibiotic plan established.  Entered: Marvel Mcdowell MD 05/27/2021, 3:38 PM       The patient's care was discussed with the Attending Physician, Dr. Harris.    Marvel Mcdowell,  MD [facility]  Please see sign in/sign out for up to date coverage information  ______________________________________________________________________    Interval History   Care team notes reviewed, no acute changes overnight. Patient continues to feel better following seizure yesterday. Son at bedside. Flank pain has resolved, no dysuria. Has been straight intermittent cathing due to urinary retention. No BMs    Data reviewed today: I reviewed all medications, new labs and imaging results over the last 24 hours. I personally reviewed no images or EKG's today.    Physical Exam   Vital Signs: Temp: 97.2  F (36.2  C) Temp src: Axillary BP: 138/65 Pulse: 99   Resp: 20 SpO2: 97 % O2 Device: Nasal cannula Oxygen Delivery: 2 LPM  Weight: 0 lbs 0 oz  Constitutional: awake, alert, cooperative, no apparent distress, and appears stated age  Eyes: Lids and lashes normal, pupils equal, round and reactive to light, extra ocular muscles intact, sclera clear, conjunctiva normal  Respiratory: No increased work of breathing, good air exchange, clear to auscultation bilaterally, no wheezing. Fine crackles in posterior bases.  Cardiovascular: Normal apical impulse, regular rate and rhythm, normal S1 and S2, no S3 or S4, and no murmur noted  GI: No scars, normal bowel sounds, soft, non-distended, non-tender, no masses palpated, no hepatosplenomegally  Skin: no bruising or bleeding and normal skin color, texture, turgor  Musculoskeletal: no lower extremity pitting edema present  Neurologic: Awake, alert, oriented to name, place and time.  Cranial nerves II-XII are grossly intact.  Right pronator drift at baseline. Right motor 4 out of 5, left motor 5 out of 5.  Data   Recent Labs   Lab 05/27/21  0627 05/26/21  2145 05/26/21  1209 05/26/21  1040 05/24/21  0639   WBC 8.7  --   --  9.8  --    HGB 8.5*  --   --  12.5 10.7*   MCV 96  --   --  97  --      --   --  236 074    *  --   --  141 140   POTASSIUM 3.2*  --   --  3.6 3.8   CHLORIDE 119*  --   --  105 107   CO2 21  --   --  25 23   BUN 10  --   --  17 18   CR 0.38* 0.40* 0.57 0.58 0.54   ANIONGAP 5  --   --  11 10   ESTIVEN 6.7*  --   --  9.6 8.6   *  --   --  132* 89   ALBUMIN  --   --   --  3.3*  --    PROTTOTAL  --   --   --  6.8  --    BILITOTAL  --   --   --  0.5  --    ALKPHOS  --   --   --  106  --    ALT  --   --   --  31  --    AST  --   --   --  23  --      Recent Results (from the past 24 hour(s))   CT Abdomen Pelvis w/o Contrast    Narrative    CT of the Abdomen and Pelvis without contrast, 5/26/2021 4:02 PM.    Comparison: CT abdomen and pelvis 4/23/2021.    History: Flank pain, kidney stone suspected.     Technique: Axial images of the  abdomen and pelvis were obtained  without contrast. Coronal reconstructions were provided. Images were  reviewed in bone, lung, and soft tissue windows.    Findings:    Lung bases:  Heart size is normal. No pericardial effusion. Distal esophagus is  normal. Bibasilar, left greater than right interstitial thickening and  intralobular groundglass opacities. Small right greater than left  pleural effusions.    Abdomen and Pelvis:   Liver is unremarkable. Subcentimeter hypodensity in hepatic segment 4B  along the gallbladder fossa is too small to characterize but favored  to represent a cyst (series 3, image 147). Prominent right hepatic  lobe intrahepatic biliary ducts. Gallbladder is distended. No wall  thickening, pericholecystic fluid, or calcified stone. Spleen size is  within normal limits. Fatty atrophy of the pancreas. Likely  interdigitation of fat along the pancreatic body (series 3, image  141). The main pancreatic duct is not dilated.    No adrenal mass. Symmetric renal enhancement. No hydronephrosis.  Scattered bilateral subcentimeter cortical hypodensities are too small  to characterize by CT. Contrast within the renal collecting system  limits evaluation for  stones. Bladder is distended with intraluminal  contrast.    Stomach is decompressed. No small or large bowel dilation.. The  appendix is normal. No free intraperitoneal air. Diverticulosis  without additional evidence to suggest acute diverticulitis. Stable  size with interval decreased attenuation of the right retroperitoneal  hematoma measuring 8.0 x 12.3 cm (series 3, image 214) with mild  peripheral enhancement. No new intra-abdominal fluid collection.    Abdominal aorta and major branches are normal in caliber with moderate  predominantly aortoiliac atherosclerotic calcifications. IVC filter in  appropriate position with tip terminating below the level of the renal  veins. The IVC is decompressed with pancake appearance, unchanged.    No mesenteric, retroperitoneal, or pelvic lymphadenopathy by size  criteria.    Bones and Soft Tissues:   No suspicious osseous lesion. Advanced degenerative changes of the  thoracolumbar spine. Grade 1 anterolisthesis of L3 and L4. No  suspicious mass. Small fat-containing umbilical hernia.      Impression    Impression:     1. Limited evaluation for stones due to residual contrast in the renal  collecting system although no additional secondary findings to suggest  obstructing stone including lack of hydronephrosis, hydroureter, or  periureteral stranding.  2. Evolving right retroperitoneal hematoma with liquefaction which is  not significantly changed in size from prior exam. No new  intra-abdominal fluid collection.  3. Diverticulosis without additional evidence to suggest acute  diverticulitis.    I have personally reviewed the examination and initial interpretation  and I agree with the findings.    ESTEPHANIA CABEZAS MD

## 2021-05-27 NOTE — PLAN OF CARE
Status: Pt with hx of GBM resection 4/21 admitted for stroke-like symptoms (worsening aphasia and R sided weakness) at TCU, work-up negative. Symptoms similar to seizure patient had 5/11/21 per MD note  Vitals: Pt wears CPAP at home, wore oxymask 3L overnight, satting high 90s. Had max temp of 100.4, resolved to 97.9 after tylenol. Consistent tachycardia 107-130, Mds aware and state this is not an acute finding. Respirations 20-24. BP taken on L arm d/t limb alert on R arm, unsure why this is on  Neuros: Lethargic overnight, easy to arouse. Ox3, unable to state correct month or year. 4/5 throughout. Denies N/T. UE Tremors at times, worse on R side. Patient with mild expressive aphasia, WFDm slow, whispered speech  IV: PIV infusing NS @ 125  Labs/Electrolytes: Sepsis triggered overnight, lactic 1.4. Has UTI, on bactrim   Resp/trach: On 3L 02, lungs diminished, tachypenic @ times   Diet: Dd2 with nectar liquids. Takes pills whole in applesauce. 1:1 supervision would probably benefit patient   Bowel status: Had BM last evening, incontinent   : Patient did not void since 2100 when getting to 6A, bladder scanned @ 0400 for 538ml. Cathd at 0500 for 350ml. Urine carlin with odor (has UTI)  Skin: Patient blotchy and red throughout. Bruising throughout  Pain: Had back pain overnight, controlled with tylenol  Activity: Has not been OOB since being on 6A, ED unsure how pt gets up. Patient does state she was using 1 person and a walker at rehab. Turn q2   Social: Son designated visitor, present with patient upon arrival   Plan: Unsure if patient will get EEG leads today. Continue with POC   Updates this shift: Had keppra load in ED. MD paged about temp and HR, did resolve by 0400, to 97.9 and HR in 90s

## 2021-05-27 NOTE — PHARMACY-ADMISSION MEDICATION HISTORY
Admission Medication History Completed by Pharmacy    See [facility] Admission Navigator for allergy information, preferred outpatient pharmacy, prior to admission medications and immunization status.     Medication History Sources:     Discharge summary from  TCU (transferred from TCU to ED)    Changes made to PTA medication list (reason):    Added: None    Deleted: None    Changed: None    Additional Information:    None    Prior to Admission medications    Medication Sig Last Dose Taking? Auth Provider   acetaminophen (TYLENOL) 325 MG tablet Take 650 mg by mouth every 4 hours as needed for mild pain   Yes Unknown, Entered By History   albuterol (PROAIR HFA/PROVENTIL HFA/VENTOLIN HFA) 108 (90 Base) MCG/ACT inhaler Inhale 2 puffs into the lungs every 4 hours as needed for wheezing  Yes Doug Almaguer MD   albuterol (PROVENTIL) (2.5 MG/3ML) 0.083% neb solution Take 1 vial (2.5 mg) by nebulization every 4 hours as needed for wheezing or shortness of breath / dyspnea  Yes Duke Bernabe MD   amLODIPine (NORVASC) 5 MG tablet Take 1 tablet (5 mg) by mouth daily  Yes Oliver Cheng MD   bisacodyl (DULCOLAX) 10 MG suppository Place 1 suppository (10 mg) rectally daily as needed for constipation  Yes Duke Bernabe MD   busPIRone HCl (BUSPAR) 30 MG tablet Take 30 mg by mouth 2 times daily  Yes Unknown, Entered By History   calcium carbonate (TUMS) 500 MG chewable tablet Take 2 tablets (1,000 mg) by mouth 4 times daily as needed for heartburn  Yes Oliver Cheng MD   calcium polycarbophil (FIBERCON) 625 MG tablet Take 1 tablet (625 mg) by mouth daily  Yes Oliver Cheng MD   carboxymethylcellulose PF (REFRESH PLUS) 0.5 % ophthalmic solution Place 1 drop into both eyes every hour as needed for dry eyes  Yes Duke Bernabe MD   doxepin (SINEQUAN) 10 MG/ML (HIGH CONC) solution Take 0.3 mLs (3 mg) by mouth At Bedtime  Yes Marvel Mcdowell MD   enoxaparin ANTICOAGULANT (LOVENOX) 40 MG/0.4ML syringe Inject 0.4 mLs  (40 mg) Subcutaneous every 24 hours  Yes Oliver Cheng MD   FLUoxetine (PROZAC) 20 MG capsule Take 3 capsules (60 mg) by mouth daily  Yes Marvel Mcdowell MD   furosemide (LASIX) 40 MG tablet Take 1 tablet (40 mg) by mouth daily  Yes Marvel Mcdowell MD   glucose 40 % (400 mg/mL) gel Take 15-30 g by mouth every 15 minutes as needed for low blood sugar  Yes Oliver Cheng MD   levETIRAcetam (KEPPRA) 750 MG tablet Take 1 tablet (750 mg) by mouth 2 times daily  Yes Marvel Mcdowell MD   linaclotide (LINZESS) 290 MCG capsule Take 1 capsule (290 mcg) by mouth every morning (before breakfast)  Yes Duke Bernabe MD   LORazepam (ATIVAN) 0.5 MG tablet Take 1 tablet (0.5 mg) by mouth every 4 hours as needed for anxiety  Yes Marvel Mcdowell MD   melatonin 3 MG tablet Take 2 tablets (6 mg) by mouth nightly as needed for sleep  Yes Marvel Mcdowell MD   oxyCODONE (ROXICODONE) 5 MG tablet Take 0.5 tablets (2.5 mg) by mouth 3 times daily  Yes Marvel Mcdowell MD   oxyCODONE (ROXICODONE) 5 MG tablet Take 0.5-1 tablets (2.5-5 mg) by mouth every 4 hours as needed for moderate to severe pain  Yes Marvel Mcdowell MD   pantoprazole (PROTONIX) 40 MG EC tablet Take 1 tablet (40 mg) by mouth 2 times daily (before meals)  Yes Marvel Mcdowell MD   polyethylene glycol (MIRALAX) 17 GM/Dose powder Take 17 g by mouth daily as needed for constipation  Yes Marvel Mcdowell MD   QUEtiapine (SEROQUEL) 25 MG tablet Take 1 tablet (25 mg) by mouth 2 times daily  Yes Marvel Mcdowell MD   QUEtiapine (SEROQUEL) 50 MG tablet Take 1 tablet (50 mg) by mouth At Bedtime  Yes Marvel Mcdowell MD   sennosides (SENOKOT) 8.6 MG tablet Take 1 tablet by mouth 2 times daily as needed for constipation  Yes Marvel Mcdowell MD   simethicone (MYLICON) 40 MG/0.6ML suspension Take 0.6 mLs (40 mg) by mouth every 6 hours as needed for cramping  Yes Duke Bernabe MD    sulfamethoxazole-trimethoprim (BACTRIM) 400-80 MG tablet Take 1 tablet by mouth daily  Yes Marvel Mcdowell MD   Skin Protectants, Misc. (EUCERIN) cream Apply topically every hour as needed for dry skin   Oliver Cheng MD       Date completed: 05/26/21    Medication history completed by: Betsy Pang, PharmD

## 2021-05-27 NOTE — PROVIDER NOTIFICATION
MD Kristine notified of tachycardia 118-132. Also no CPAP orders. CPAP orders added, also states the HR is not an acute finding and will continue to monitor Also notified about sepsis trigger, lactic 1.4

## 2021-05-28 ENCOUNTER — APPOINTMENT (OUTPATIENT)
Dept: PHYSICAL THERAPY | Facility: CLINIC | Age: 76
DRG: 054 | End: 2021-05-28
Payer: MEDICARE

## 2021-05-28 ENCOUNTER — APPOINTMENT (OUTPATIENT)
Dept: OCCUPATIONAL THERAPY | Facility: CLINIC | Age: 76
DRG: 054 | End: 2021-05-28
Payer: MEDICARE

## 2021-05-28 LAB
ANION GAP SERPL CALCULATED.3IONS-SCNC: 6 MMOL/L (ref 3–14)
BUN SERPL-MCNC: 8 MG/DL (ref 7–30)
CALCIUM SERPL-MCNC: 9 MG/DL (ref 8.5–10.1)
CHLORIDE SERPL-SCNC: 115 MMOL/L (ref 94–109)
CO2 SERPL-SCNC: 24 MMOL/L (ref 20–32)
CREAT SERPL-MCNC: 0.4 MG/DL (ref 0.52–1.04)
ERYTHROCYTE [DISTWIDTH] IN BLOOD BY AUTOMATED COUNT: 17.6 % (ref 10–15)
GFR SERPL CREATININE-BSD FRML MDRD: >90 ML/MIN/{1.73_M2}
GLUCOSE SERPL-MCNC: 130 MG/DL (ref 70–99)
HCT VFR BLD AUTO: 29.3 % (ref 35–47)
HGB BLD-MCNC: 9.1 G/DL (ref 11.7–15.7)
LEVETIRACETAM SERPL-MCNC: 49 UG/ML (ref 12–46)
MAGNESIUM SERPL-MCNC: 1.8 MG/DL (ref 1.6–2.3)
MCH RBC QN AUTO: 30.2 PG (ref 26.5–33)
MCHC RBC AUTO-ENTMCNC: 31.1 G/DL (ref 31.5–36.5)
MCV RBC AUTO: 97 FL (ref 78–100)
PHOSPHATE SERPL-MCNC: 2.5 MG/DL (ref 2.5–4.5)
PLATELET # BLD AUTO: 179 10E9/L (ref 150–450)
POTASSIUM SERPL-SCNC: 3.6 MMOL/L (ref 3.4–5.3)
RBC # BLD AUTO: 3.01 10E12/L (ref 3.8–5.2)
SODIUM SERPL-SCNC: 145 MMOL/L (ref 133–144)
WBC # BLD AUTO: 6 10E9/L (ref 4–11)

## 2021-05-28 PROCEDURE — 97161 PT EVAL LOW COMPLEX 20 MIN: CPT | Mod: GP

## 2021-05-28 PROCEDURE — 83735 ASSAY OF MAGNESIUM: CPT | Performed by: STUDENT IN AN ORGANIZED HEALTH CARE EDUCATION/TRAINING PROGRAM

## 2021-05-28 PROCEDURE — 84100 ASSAY OF PHOSPHORUS: CPT | Performed by: STUDENT IN AN ORGANIZED HEALTH CARE EDUCATION/TRAINING PROGRAM

## 2021-05-28 PROCEDURE — 120N000002 HC R&B MED SURG/OB UMMC

## 2021-05-28 PROCEDURE — 250N000011 HC RX IP 250 OP 636: Performed by: STUDENT IN AN ORGANIZED HEALTH CARE EDUCATION/TRAINING PROGRAM

## 2021-05-28 PROCEDURE — 97530 THERAPEUTIC ACTIVITIES: CPT | Mod: GP

## 2021-05-28 PROCEDURE — 85027 COMPLETE CBC AUTOMATED: CPT | Performed by: STUDENT IN AN ORGANIZED HEALTH CARE EDUCATION/TRAINING PROGRAM

## 2021-05-28 PROCEDURE — 250N000012 HC RX MED GY IP 250 OP 636 PS 637: Performed by: STUDENT IN AN ORGANIZED HEALTH CARE EDUCATION/TRAINING PROGRAM

## 2021-05-28 PROCEDURE — 250N000013 HC RX MED GY IP 250 OP 250 PS 637: Performed by: STUDENT IN AN ORGANIZED HEALTH CARE EDUCATION/TRAINING PROGRAM

## 2021-05-28 PROCEDURE — 97535 SELF CARE MNGMENT TRAINING: CPT | Mod: GO

## 2021-05-28 PROCEDURE — 36415 COLL VENOUS BLD VENIPUNCTURE: CPT | Performed by: STUDENT IN AN ORGANIZED HEALTH CARE EDUCATION/TRAINING PROGRAM

## 2021-05-28 PROCEDURE — 97116 GAIT TRAINING THERAPY: CPT | Mod: GP

## 2021-05-28 PROCEDURE — 99233 SBSQ HOSP IP/OBS HIGH 50: CPT | Mod: GC | Performed by: STUDENT IN AN ORGANIZED HEALTH CARE EDUCATION/TRAINING PROGRAM

## 2021-05-28 PROCEDURE — 80048 BASIC METABOLIC PNL TOTAL CA: CPT | Performed by: STUDENT IN AN ORGANIZED HEALTH CARE EDUCATION/TRAINING PROGRAM

## 2021-05-28 RX ORDER — CEFDITOREN PIVOXIL 200 MG/1
400 TABLET, FILM COATED ORAL EVERY 12 HOURS SCHEDULED
Status: DISCONTINUED | OUTPATIENT
Start: 2021-05-28 | End: 2021-05-28

## 2021-05-28 RX ORDER — CEFDINIR 300 MG/1
300 CAPSULE ORAL EVERY 12 HOURS SCHEDULED
Status: COMPLETED | OUTPATIENT
Start: 2021-05-29 | End: 2021-05-31

## 2021-05-28 RX ADMIN — CALCIUM POLYCARBOPHIL 625 MG: 625 TABLET, FILM COATED ORAL at 09:19

## 2021-05-28 RX ADMIN — ENOXAPARIN SODIUM 40 MG: 40 INJECTION SUBCUTANEOUS at 20:56

## 2021-05-28 RX ADMIN — DEXAMETHASONE 2 MG: 2 TABLET ORAL at 09:19

## 2021-05-28 RX ADMIN — FLUOXETINE HYDROCHLORIDE 60 MG: 40 CAPSULE ORAL at 09:20

## 2021-05-28 RX ADMIN — FUROSEMIDE 40 MG: 20 TABLET ORAL at 09:20

## 2021-05-28 RX ADMIN — QUETIAPINE FUMARATE 25 MG: 25 TABLET ORAL at 09:18

## 2021-05-28 RX ADMIN — BUSPIRONE HYDROCHLORIDE 30 MG: 10 TABLET ORAL at 09:18

## 2021-05-28 RX ADMIN — SULFAMETHOXAZOLE AND TRIMETHOPRIM 1 TABLET: 400; 80 TABLET ORAL at 09:21

## 2021-05-28 RX ADMIN — LEVETIRACETAM 1250 MG: 750 TABLET, FILM COATED ORAL at 09:21

## 2021-05-28 RX ADMIN — ACETAMINOPHEN 650 MG: 325 TABLET, FILM COATED ORAL at 22:27

## 2021-05-28 RX ADMIN — PANTOPRAZOLE SODIUM 40 MG: 40 TABLET, DELAYED RELEASE ORAL at 16:22

## 2021-05-28 RX ADMIN — LINACLOTIDE 290 MCG: 145 CAPSULE, GELATIN COATED ORAL at 09:19

## 2021-05-28 RX ADMIN — DOXEPIN HYDROCHLORIDE 3 MG: 10 SOLUTION ORAL at 22:27

## 2021-05-28 RX ADMIN — QUETIAPINE FUMARATE 25 MG: 25 TABLET ORAL at 20:56

## 2021-05-28 RX ADMIN — BUSPIRONE HYDROCHLORIDE 30 MG: 10 TABLET ORAL at 20:56

## 2021-05-28 RX ADMIN — PANTOPRAZOLE SODIUM 40 MG: 40 TABLET, DELAYED RELEASE ORAL at 09:20

## 2021-05-28 RX ADMIN — LEVETIRACETAM 1250 MG: 750 TABLET, FILM COATED ORAL at 20:56

## 2021-05-28 RX ADMIN — DEXAMETHASONE 2 MG: 2 TABLET ORAL at 20:56

## 2021-05-28 RX ADMIN — AMLODIPINE BESYLATE 5 MG: 5 TABLET ORAL at 09:21

## 2021-05-28 RX ADMIN — CEFTRIAXONE 1 G: 1 INJECTION, POWDER, FOR SOLUTION INTRAMUSCULAR; INTRAVENOUS at 14:48

## 2021-05-28 RX ADMIN — QUETIAPINE 50 MG: 50 TABLET ORAL at 22:27

## 2021-05-28 ASSESSMENT — ACTIVITIES OF DAILY LIVING (ADL)
ADLS_ACUITY_SCORE: 19
ADLS_ACUITY_SCORE: 23
ADLS_ACUITY_SCORE: 19
ADLS_ACUITY_SCORE: 23

## 2021-05-28 NOTE — PROGRESS NOTES
05/28/21 1323   Quick Adds   Type of Visit Initial PT Evaluation       Present no   Language English   Living Environment   People in home alone   Current Living Arrangements house   Home Accessibility stairs to enter home;stairs within home   Number of Stairs, Main Entrance 3   Stair Railings, Main Entrance railing on left side (ascending)   Number of Stairs, Within Home, Primary   (flight to second level, does not need to access second level)   Stair Railings, Within Home, Primary railing on left side (ascending)   Transportation Anticipated family or friend will provide   Living Environment Comments Pt lives alone in multi level [facility] although all needs are met on main level. Patient long term goal to dc home.   Self-Care   Usual Activity Tolerance good   Current Activity Tolerance poor   Regular Exercise Yes   Activity/Exercise Type walking   Exercise Amount/Frequency 3-5 times/wk   Equipment Currently Used at Home grab bar, toilet;shower chair;cane, straight;walker, rolling   Activity/Exercise/Self-Care Comment Pt has adaptive equipment following a TKA last year, patient did not use AD at prior level of function.l    Disability/Function   Hearing Difficulty or Deaf no   Wear Glasses or Blind yes   Vision Management glasses   Concentrating, Remembering or Making Decisions Difficulty no   Difficulty Communicating no   Difficulty Eating/Swallowing no   Walking or Climbing Stairs Difficulty no   Mobility Management Pt did not use AD at prior level, since GBM resection has veen using 2WW for ambulation.   Dressing/Bathing Difficulty yes   Dressing/Bathing bathing difficulty, requires equipment   Dressing/Bathing Management uses shower chair   Toileting issues yes   Toileting Management grab bars   Toileting Assistance toileting difficulty, requires equipment   Doing Errands Independently Difficulty (such as shopping) yes   Errands Management family assisted   Fall history within last six  "months no   Change in Functional Status Since Onset of Current Illness/Injury yes   General Information   Onset of Illness/Injury or Date of Surgery 02/26/21   Referring Physician Marvel Mcdowell MD   Patient/Family Therapy Goals Statement (PT) progress to PLOF and return home mod I    Pertinent History of Current Problem (include personal factors and/or comorbidities that impact the POC) Per chart: \"Olga Bailey is a 75 year old female who has a history of Glioblastoma s/p resection in 03/2021, anxiety, depression, and HTN and is admitted after having a seizure at the TCU.\"   Existing Precautions/Restrictions fall;seizures   General Observations Activity Orders: up with assist   Cognition   Orientation Status (Cognition) oriented x 3   Affect/Mental Status (Cognition) WFL  (some confusion, improves with choices or prompts)   Follows Commands (Cognition) WFL   Cognitive Status Comments Some variations in answers when asked about previous TCU stay and discharge location.    Pain Assessment   Patient Currently in Pain Yes, see Vital Sign flowsheet   Integumentary/Edema   Integumentary/Edema Comments Pt states increased sweling in B LE. appears greater in R LE.    Posture    Posture Protracted shoulders;Forward head position   Posture Comments Occ left lean when sitting EOB, standing with flexed forward posture.    Range of Motion (ROM)   ROM Quick Adds ROM WFL   ROM Comment slight restriciton in B ankle dorsiflexion    Strength   Strength Comments functional weakness noted with poor standing tolerance and B knee buckling. increased weaknes noted R>L. Unable to formally assess MMT due to poor sitting balance.    Bed Mobility   Comment (Bed Mobility) mod I rolling left and right, supine <> sit with min A with HOB elevated and use of bed rail.    Transfers   Transfer Safety Comments Sit <> stand min A x2, occ CGA from EOB. mod A x1-2 upon standing and for tranfser to chair.    Gait/Stairs (Locomotion) "   Comment (Gait/Stairs) Gait with short step to pattern, B knee buckling, min A x2 for safety.    Balance   Balance Comments leans L, corrects with cues and UE assist. improved with walker in front. Standing balance, mod A x2 initally to establish SABA upon standing, heavy posterior lean.    Sensory Examination   Sensory Perception Comments accuracy with light touch, reports B sensation changes   Coordination   Coordination Comments tremor in B UE noted   Clinical Impression   Criteria for Skilled Therapeutic Intervention yes, treatment indicated   PT Diagnosis (PT) impaired functional mobility   Influenced by the following impairments weakness, impaired baalnce, poor activity tolerance.    Functional limitations due to impairments impaired gait, impaired transfers, impaired bed mobility, fall risk    Clinical Presentation Stable/Uncomplicated   Clinical Presentation Rationale medical history, clinical judgement    Clinical Decision Making (Complexity) low complexity   Therapy Frequency (PT) 5x/week   Predicted Duration of Therapy Intervention (days/wks) 1 week   Planned Therapy Interventions (PT) balance training;bed mobility training;gait training;motor coordination training;neuromuscular re-education;patient/family education;ROM (range of motion);strengthening   Risk & Benefits of therapy have been explained evaluation/treatment results reviewed;care plan/treatment goals reviewed;risks/benefits reviewed;current/potential barriers reviewed;participants voiced agreement with care plan;participants included;patient   PT Discharge Planning    PT Discharge Recommendation (DC Rec) Transitional Care Facility   PT Rationale for DC Rec Patient significantly below baseline function and requires assist x2 for functional mobility. Pt would benefit from ongoing skilled physical therapy to progress to PLOF    PT Brief overview of current status  min A x2 for transfers with walker and gait bed.    Total Evaluation Time   Total  Evaluation Time (Minutes) 9

## 2021-05-28 NOTE — PROGRESS NOTES
Care Management Follow Up    Length of Stay (days): 2    Expected Discharge Date: 05/29/21     Concerns to be Addressed: discharge planning     Patient plan of care discussed at interdisciplinary rounds: Yes    Anticipated Discharge Disposition: TBD- TCU recommended  Anticipated Discharge Services: TBD  Anticipated Discharge DME: TBD    Patient/family educated on Medicare website which has current facility and service quality ratings: NA- pt and family requested referral be made to [facility]  Education Provided on the Discharge Plan: yes  Patient/Family in Agreement with the Plan: yes    Referrals Placed by CM/SW:   [facility] [address] [phone]  -  faxed referral via epic.      Private pay costs discussed: Not applicable    Additional Information:  CAMRON informed that pt's [facility] needs radiation tx. Pt came from  TCU. SW updated  TCU and they are aware pt would like new [facility] TCU referral made. CAMRON faxed referral via [employer] to [place].    MACARIO Sutton, SW  Acute Care Float   [facility]  Phone: [phone]  Pager: [phone]

## 2021-05-28 NOTE — PLAN OF CARE
Status: Hx GBM s/p resection in 3/2021, admitted after having a seizure at the TCU  Vitals: VSS on 1-2L NC  Neuros: Alert, D/O time. Slow speech, slight RUE drift. BUE tremors. 5/5 strengths with R side slightly weaker than L  IV: PIV TKO between abx  Resp/trach: Dyspnea on exertion and tachycardic/ tachypneic with activity  Diet: DD2 with nectar thick liquids and usama counts. Takes pills whole in applesauce  Bowel status: Fecal incontinence. 2x loose BM this shift  : Voiding spontaneously. PVR bladder scan today 75mL  Skin: Bruising throughout  Pain: Denied  Activity: Up with A2/GB. Turn/repo Q2h  Social: Son here today  Plan: Anticipated discharge 2-3 days. Continue to follow POC

## 2021-05-28 NOTE — PROGRESS NOTES
[facility]    Progress Note - Maximilian 3 Service        Date of Admission:  5/26/2021    Assessment & Plan       Olga Bailey is a 75 year old female who has a history of Glioblastoma s/p resection in 03/2021, anxiety, depression, and HTN and is admitted after having a seizure at the TCU.    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 Has been on low dose Levetiracetam up to 750 mg BD at discharge. On arrival, patient was in post-ictal state. Neurology consulted, suspect seizure secondary to GBM. Previous EEG imaging demonstrated seizure foci at GBM lesion.  - Neurology consulted  - Levetiracetam 1250 mg BID  - AED level of keppra pending  - dexamethasone 2 mg bid x 7days, followed by 2 mg qday for 7 days  - omeprazole 20 mg qday for duration of dexamethasone treatment  - If another seizure occurs, give 300 mg Vimpat IV load and 100 mg Vimpat bid     Glioblastoma Multiforme  Left frontoparietal brain glioblastoma status post resection 2/23/2021. Seen by radiation oncology 3/24 recommended XRT and chemo radiation. However subsequently admitted for resp failure. Hem/onc concerned that she might not be a candidate for chemotherapy or radiation currently due to poor performance status. In June, will initiate temozolomide at adjuvant-dosing  - 1 month follow up with Dr. Baker  - Completed 15 sessions of radiation on 5/27     Acute hypoxic respiratory failure   Recent history of Presumed PJP Pneumonia  Continue supplemental O2 as needed, due to steroids will continue home dose of bactrim.  - Supplemental O2 PRN  - PTA bactrim     Urinary Retention  Uncomplicated UTI  Patient without dysuria and presented with right flank pain. UA in ED with leuk esterase and WBC elevated. Afebrile and CBC without leukocytosis. Awaiting culture results.Continues to have intermittent  urinary retention with need for catheterization, if this continues will need mccray placement.  - CTX for 5 days     Major depressive disorder, recurrent  Anxiety  Follows with psychiatry and psychology as outpatint.  Saw health psychology during admission for GBM. Seeing health psychology while at TCU.   - Seroquel 25 mg BID + 50 mg at bedtime  - Ativan 0.5 mg q4h PRN  - Scheduled doxepin PRN at night for sleep  - c/t PTA Buspar  - PTA prozac 60 mg     Bilateral lower extremity DVT  Right retroperitoneal hematoma   On 3/29 at outside hospital patient's O2 needs increased, duplex ultrasound revealed bilateral lower extremity DVT. CT PE negative for embolism.  Treated with IV heparin and transitioned to Lovenox 70 mg. On 4/14 this was complicated by retroperitoneal bleed, requiring 4 units of packed red blood cells.  Lovenox was held, and IVC filter was placed on 4/16. Vascular surgery was involved, and a CT abdomen was stable bleed so no surgical intervention was required.  Eventually resumed on prophylactic 40mg of Lovenox twice daily.  -Continue 40 mg Lovenox qday     Constipation  -Continue prior Bowel regimen: linaclotide, mesalamine, MiraLAX, simethicone PRN     Non-severe malnutrition  Previous admission required TPN and nutrition consulted.  - Nutrition consulted.     Hypertension  Started on metoprolol at OSH for sinus tachycardia.  - c/t PTA amlodipine 10 mg qday    Hyperkalemia (Resolved)  Potassium of 3.2 on 5/27, resolved to 3.6 on 3/28.    Anemia on chronic disease  Hgb baseline  In 8-9 range, no evidence of bleeding. Continue to monitor.     Chronic medical problems:  - albuterol PRN  - GERD: back to PTA PO PPI     Diet: Combination Diet Dysphagia Diet Level 2: Mechan Altered; Nectar Thickened Liquids (pre-thickened or use instant food thickener)  Calorie Counts  Snacks/Supplements Adult: Magic Cup; With Meals    Fluids: None  Lines: PIV  DVT Prophylaxis: Enoxaparin (Lovenox) SQ  Mccray Catheter: not  present  Code Status: Full Code           Disposition Plan   Expected discharge: 2 - 3 days, recommended to prior living arrangement once antibiotic plan established.  Entered: Marvel Mcdowell MD 05/28/2021, 2:05 PM       The patient's care was discussed with the Attending Physician, Dr. Harris.    Marvel Mcdowell MD  [address]  Please see sign in/sign out for up to date coverage information  ______________________________________________________________________    Interval History   Care team notes reviewed. No acute changes overnight. Continues having leg weakness, no evidence of neurological symptoms. No fevers, chills, sweats. No abdominal pain. Eating well. Son at bedside.    Data reviewed today: I reviewed all medications, new labs and imaging results over the last 24 hours. I personally reviewed no images or EKG's today.    Physical Exam   Vital Signs: Temp: 96.1  F (35.6  C) Temp src: Oral BP: 129/65 Pulse: 100   Resp: 16 SpO2: 93 % O2 Device: Nasal cannula Oxygen Delivery: 1.5 LPM  Weight: 0 lbs 0 oz  Constitutional: awake, alert, cooperative, no apparent distress, and appears stated age  Eyes: Lids and lashes normal, pupils equal, round and reactive to light, extra ocular muscles intact, sclera clear, conjunctiva normal  Respiratory: No increased work of breathing, good air exchange, clear to auscultation bilaterally, no wheezing. Fine crackles in posterior bases.  Cardiovascular: Normal apical impulse, regular rate and rhythm, normal S1 and S2, no S3 or S4, and no murmur noted  GI: No scars, normal bowel sounds, soft, non-distended, non-tender, no masses palpated, no hepatosplenomegally  Skin: no bruising or bleeding and normal skin color, texture, turgor  Musculoskeletal: no lower extremity pitting edema present  Neurologic: Awake, alert, oriented to name, place and time.  Cranial nerves II-XII are grossly intact.  Right  pronator drift at baseline. Right motor 4 out of 5, left motor 5 out of 5.  Data   Recent Labs   Lab 05/28/21  0705 05/27/21  0627 05/26/21  2145 05/26/21  1040 05/26/21  1040   WBC 6.0 8.7  --   --  9.8   HGB 9.1* 8.5*  --   --  12.5   MCV 97 96  --   --  97    154  --   --  236   * 145*  --   --  141   POTASSIUM 3.6 3.2*  --   --  3.6   CHLORIDE 115* 119*  --   --  105   CO2 24 21  --   --  25   BUN 8 10  --   --  17   CR 0.40* 0.38* 0.40*   < > 0.58   ANIONGAP 6 5  --   --  11   ESTIVEN 9.0 6.7*  --   --  9.6   * 120*  --   --  132*   ALBUMIN  --   --   --   --  3.3*   PROTTOTAL  --   --   --   --  6.8   BILITOTAL  --   --   --   --  0.5   ALKPHOS  --   --   --   --  106   ALT  --   --   --   --  31   AST  --   --   --   --  23    < > = values in this interval not displayed.     No results found for this or any previous visit (from the past 24 hour(s)).

## 2021-05-28 NOTE — PROGRESS NOTES
Care Management Follow Up    Length of Stay (days): 2    Expected Discharge Date: 05/29/21     Concerns to be Addressed:       Patient plan of care discussed at interdisciplinary rounds: Yes    Anticipated Discharge Disposition:  TCU     Additional Information:  In 6A Discharge Rounds nursing reported pt has a UTI, on IV antibiotics. Incontinent, straight cathed x1. On a DD 2 diet. Up with 2 assist. On oxygen. Expressive aphasia. Radiation treatments will be done today.   In Medicine Rounds the doctor reported plan to change to oral antibiotics; anticipate pt will be ready for discharge tomorrow. [facility] her radiation treatments.   Informed PaCAMRON, of anticipated discharge date and family [facility].       Jemma Beatyt RN Care Coordinator  Unit 6A, [facility]

## 2021-05-28 NOTE — PLAN OF CARE
Vitals: VSS on 1L via NC  Neuros: Alert and oriented x3, disoriented to exact date. BUE 5/5, BLE 4/5. BUE with tremors. Expresive aphasia, slow/whispered speech.  IV: LR infusing at 100ml/hr.  Labs/Electrolytes: K 3.2, FYI sent to MD. No new orders.  Resp/trach: Dypsnea on exertion. 94% on 1L via NC.  Diet: Dd2 with nectar thick liquids, fair intake.  Bowel status: Bs+x4, last BM today.  : Unable to void. Straight cathed at 1730, bladder scanned at 2130 for 32.  Skin: Bruising to abdomen and bilateral forearms.  Pain: Denied.  Activity: Up with assist of 2 and GB.  Social: Son at bedside this shift.  Plan: Continue to monitor and follow POC. Pt had radiation today. Receiving IV Abx for UTI.

## 2021-05-28 NOTE — PLAN OF CARE
Status: Pt with hx of GBM resection 4/21 admitted for stroke-like symptoms (worsening aphasia and R sided weakness) at TCU, work-up negative. Symptoms similar to seizure patient had 5/11/21 per MD note  Vitals: VSS on 1-2L NC overnight to maintain SpO2 in mid-high 90's.    Neuros: A&Ox3, d/o to exact date but able to correctly identify month and year with choices. Aphasia, slow whispered speech, slight RUE drift, BUE tremors > on R. side,  L. Side 5/5, R. Side 4-4+/5   IV: PIV infusing LR at 100mL.hr  Labs/Electrolytes: K 3.2, MD has been notified   Resp/trach: LS diminished. 1-2L NC overnight to keep SpO2 >90%, baseline wears CPAP. Dyspnea on exertion. Intermittent tachypnea with activity  Diet: DD2 with nectar liquids and usama counts. Takes pills whole in applesauce.   Bowel status: Fecal incontinence. No BM this shift, LBM 5/27  : Per report, pt feels no sensation to void. Bladder scan for 543mL at 0000 and straight cathed at 0030 for 300mL. At 0500 pt. Incontinent of urine with PVR of 364, able to void 250 mL via bedpan per request commode also available for future transfers  Skin: Patient blotchy and red throughout. Bruising throughout  Pain: Denied   Activity: A2/GB. Turn/repo q2hrs  Social: Son designated visitor  Plan: Anticipated discharge in 2 - 3 days, recommended to prior living arrangement once antibiotic plan established. Continue to monitor and follow POC

## 2021-05-28 NOTE — PLAN OF CARE
Status: Hx GBM s/p resection in 3/2021, admitted after having a seizure at the TCU  Vitals: VSS on 1-2L NC; increased w/activity  Neuros: A&Ox3, d/o time. Slow speech, mild WF difficulty. Slight RUE drift. BUE tremors. 5/5 strengths with R <L  IV: PIV SL; IV abx d/c  Resp/trach: Dyspnea on exertion and tachycardic/ tachypneic with activity  Diet: DD2 with nectar thick liquids and usama counts. Encouraged intake, pt reports little appetite. Takes pills whole in applesauce  Bowel status: No BM this shift, loose BM x2 this AM, hold bowel meds  : Voiding spontaneously, intermittent incontinence   Skin: Bruising throughout  Pain: Denies  Activity: Ax2/GB. Turn/repo Q2h. Declined getting up to chair  Social: Son at bedside, supportive  Plan: Eventual discharge back to TCU, no definite plan at this time

## 2021-05-29 LAB
ANION GAP SERPL CALCULATED.3IONS-SCNC: 7 MMOL/L (ref 3–14)
BUN SERPL-MCNC: 8 MG/DL (ref 7–30)
CALCIUM SERPL-MCNC: 8.9 MG/DL (ref 8.5–10.1)
CHLORIDE SERPL-SCNC: 112 MMOL/L (ref 94–109)
CO2 SERPL-SCNC: 26 MMOL/L (ref 20–32)
CREAT SERPL-MCNC: 0.43 MG/DL (ref 0.52–1.04)
CREAT SERPL-MCNC: 0.46 MG/DL (ref 0.52–1.04)
GFR SERPL CREATININE-BSD FRML MDRD: >90 ML/MIN/{1.73_M2}
GFR SERPL CREATININE-BSD FRML MDRD: >90 ML/MIN/{1.73_M2}
GLUCOSE SERPL-MCNC: 118 MG/DL (ref 70–99)
PLATELET # BLD AUTO: 204 10E9/L (ref 150–450)
POTASSIUM SERPL-SCNC: 3.3 MMOL/L (ref 3.4–5.3)
POTASSIUM SERPL-SCNC: 3.4 MMOL/L (ref 3.4–5.3)
SODIUM SERPL-SCNC: 145 MMOL/L (ref 133–144)

## 2021-05-29 PROCEDURE — 99233 SBSQ HOSP IP/OBS HIGH 50: CPT | Mod: GC | Performed by: STUDENT IN AN ORGANIZED HEALTH CARE EDUCATION/TRAINING PROGRAM

## 2021-05-29 PROCEDURE — 85049 AUTOMATED PLATELET COUNT: CPT | Performed by: STUDENT IN AN ORGANIZED HEALTH CARE EDUCATION/TRAINING PROGRAM

## 2021-05-29 PROCEDURE — 80048 BASIC METABOLIC PNL TOTAL CA: CPT | Performed by: STUDENT IN AN ORGANIZED HEALTH CARE EDUCATION/TRAINING PROGRAM

## 2021-05-29 PROCEDURE — 250N000011 HC RX IP 250 OP 636: Performed by: STUDENT IN AN ORGANIZED HEALTH CARE EDUCATION/TRAINING PROGRAM

## 2021-05-29 PROCEDURE — 36415 COLL VENOUS BLD VENIPUNCTURE: CPT | Performed by: STUDENT IN AN ORGANIZED HEALTH CARE EDUCATION/TRAINING PROGRAM

## 2021-05-29 PROCEDURE — 120N000002 HC R&B MED SURG/OB UMMC

## 2021-05-29 PROCEDURE — 250N000013 HC RX MED GY IP 250 OP 250 PS 637: Performed by: STUDENT IN AN ORGANIZED HEALTH CARE EDUCATION/TRAINING PROGRAM

## 2021-05-29 PROCEDURE — 84132 ASSAY OF SERUM POTASSIUM: CPT | Performed by: STUDENT IN AN ORGANIZED HEALTH CARE EDUCATION/TRAINING PROGRAM

## 2021-05-29 PROCEDURE — 250N000012 HC RX MED GY IP 250 OP 636 PS 637: Performed by: STUDENT IN AN ORGANIZED HEALTH CARE EDUCATION/TRAINING PROGRAM

## 2021-05-29 RX ORDER — POTASSIUM CHLORIDE 1.5 G/1.58G
40 POWDER, FOR SOLUTION ORAL ONCE
Status: COMPLETED | OUTPATIENT
Start: 2021-05-29 | End: 2021-05-29

## 2021-05-29 RX ADMIN — LEVETIRACETAM 1250 MG: 750 TABLET, FILM COATED ORAL at 19:57

## 2021-05-29 RX ADMIN — LEVETIRACETAM 1250 MG: 750 TABLET, FILM COATED ORAL at 09:05

## 2021-05-29 RX ADMIN — SULFAMETHOXAZOLE AND TRIMETHOPRIM 1 TABLET: 400; 80 TABLET ORAL at 09:03

## 2021-05-29 RX ADMIN — CALCIUM POLYCARBOPHIL 625 MG: 625 TABLET, FILM COATED ORAL at 09:03

## 2021-05-29 RX ADMIN — BUSPIRONE HYDROCHLORIDE 30 MG: 10 TABLET ORAL at 09:02

## 2021-05-29 RX ADMIN — CALCIUM CARBONATE (ANTACID) CHEW TAB 500 MG 1000 MG: 500 CHEW TAB at 09:03

## 2021-05-29 RX ADMIN — LINACLOTIDE 290 MCG: 145 CAPSULE, GELATIN COATED ORAL at 09:05

## 2021-05-29 RX ADMIN — QUETIAPINE 50 MG: 50 TABLET ORAL at 21:50

## 2021-05-29 RX ADMIN — FLUOXETINE HYDROCHLORIDE 60 MG: 40 CAPSULE ORAL at 09:03

## 2021-05-29 RX ADMIN — PANTOPRAZOLE SODIUM 40 MG: 40 TABLET, DELAYED RELEASE ORAL at 09:04

## 2021-05-29 RX ADMIN — AMLODIPINE BESYLATE 5 MG: 5 TABLET ORAL at 09:04

## 2021-05-29 RX ADMIN — BUSPIRONE HYDROCHLORIDE 30 MG: 10 TABLET ORAL at 19:58

## 2021-05-29 RX ADMIN — CEFDINIR 300 MG: 300 CAPSULE ORAL at 09:06

## 2021-05-29 RX ADMIN — DEXAMETHASONE 2 MG: 2 TABLET ORAL at 09:05

## 2021-05-29 RX ADMIN — ENOXAPARIN SODIUM 40 MG: 40 INJECTION SUBCUTANEOUS at 21:45

## 2021-05-29 RX ADMIN — DEXAMETHASONE 2 MG: 2 TABLET ORAL at 19:58

## 2021-05-29 RX ADMIN — PANTOPRAZOLE SODIUM 40 MG: 40 TABLET, DELAYED RELEASE ORAL at 15:46

## 2021-05-29 RX ADMIN — CEFDINIR 300 MG: 300 CAPSULE ORAL at 19:59

## 2021-05-29 RX ADMIN — QUETIAPINE FUMARATE 25 MG: 25 TABLET ORAL at 09:03

## 2021-05-29 RX ADMIN — POTASSIUM CHLORIDE 40 MEQ: 1.5 POWDER, FOR SOLUTION ORAL at 15:46

## 2021-05-29 RX ADMIN — DOXEPIN HYDROCHLORIDE 3 MG: 10 SOLUTION ORAL at 21:51

## 2021-05-29 RX ADMIN — FUROSEMIDE 40 MG: 20 TABLET ORAL at 09:03

## 2021-05-29 RX ADMIN — QUETIAPINE FUMARATE 25 MG: 25 TABLET ORAL at 19:57

## 2021-05-29 ASSESSMENT — ACTIVITIES OF DAILY LIVING (ADL)
ADLS_ACUITY_SCORE: 23
ADLS_ACUITY_SCORE: 20
ADLS_ACUITY_SCORE: 24
ADLS_ACUITY_SCORE: 20
ADLS_ACUITY_SCORE: 24
ADLS_ACUITY_SCORE: 24

## 2021-05-29 ASSESSMENT — MIFFLIN-ST. JEOR: SCORE: 1164.48

## 2021-05-29 NOTE — PROGRESS NOTES
Care Management Follow Up    Length of Stay (days): 3    Expected Discharge Date: 05/29/21     Concerns to be Addressed: Discharge planning      Patient plan of care discussed at interdisciplinary rounds: Yes    Anticipated Discharge Disposition:  TCU TBD     Anticipated Discharge Services:  IMM, PAS, Transportation  Anticipated Discharge DME:  O2    Patient/family educated on Medicare website which has current facility and service quality ratings:  Yes  Education Provided on the Discharge Plan:  yes  Patient/Family in Agreement with the Plan:  yes    Referrals Placed by CM/SW:  Post Acute Facilities  Private pay costs discussed: transportation costs    Additional Information:  Treatment Team: She is medically ready for discharge per team.     [facility]: CAMRON spoke with Darcie ([phone]) in admissions. They will review the referral and get back to SW. They can clinically accept patient but cannot accept her today as they do not have a private room today. They should be able to take her tomorrow. Darcie asked for the weekend SW to call on 5/30.    Addendum 1: CAMRON spoke with patient's daughter La Nena ([phone]). La Nena requested SW send more referrals even though [facility] can take her on 5/30. La Nena explained that patient will be starting chemo in the middle of June and the place she is accepted would need to be okay with that. La Nena would like the SW to send referral to [facility] in [facility] in [place] (#1), and [place]. CAMRON sent those referrals via [facility] [place]. La Nena would also like to know if patient is ARU appropriate so she can get more therapy everyday. CAMRON paged PT regarding this. She would prefer that patient go to [facility] if she is appropriate and does not want her to go to  ARU.     1. [facility]: cannot accept patient due to high acuity and chemo needs    2. [place]: Awaiting response    3. [facility] in  [place]:Awaiting Response    MACARIO Yoder, [facility]  Weekend  on 5/29  [facility]  Pager: [phone]    For weekend social work needs, contact information below and available on [facility]:  4A, 4C, 4E, 5A, 5B  pager [phone]  6A, 6B, 6C, 6D  pager [phone]  7A, 7B, 7C, 7D, 5C  pager [phone]    For weekend RN care coordinator needs (home discharge with needs including home care, assisted living facility returns, durable medical equip, IV antibiotics) - page [phone].

## 2021-05-29 NOTE — PLAN OF CARE
Speech Language Therapy Discharge Summary    Reason for therapy discharge:    Discharged to acute care hospitla    Progress towards therapy goal(s). See goals on Care Plan in Epic electronic health record for goal details.  Goals not met.  Barriers to achieving goals:   discharge from facility.    Therapy recommendation(s):    Continued therapy is recommended.  Rationale/Recommendations:   . able to tolerate current NDD-2 food textures without any overt signs and symptoms of aspiration or difficulties.  1 episode of minor throat clear when patient was distracted but throughout the remainder the meal despite conversational distractions was able to tolerate without difficulties.  Patient also tolerating nectar thick liquids without overt signs and symptoms of aspiration.  Discussed potential repeat of VFSS next week pending progress.

## 2021-05-29 NOTE — PROGRESS NOTES
[facility]    Progress Note - Maximilian 3 Service        Date of Admission:  5/26/2021    Assessment & Plan       Olga Bailey is a 75 year old female who has a history of Glioblastoma s/p resection in 03/2021, anxiety, depression, and HTN and is admitted after having a seizure at the TCU.     Today:  - Referral to TCU made for [facility]  - Continue cefdinir for total of 5 days    Seizure 2/2 GBM  Previous seizure on 5/11 during hospitalization. Has been on low dose Levetiracetam up to 750 mg BD at discharge. On arrival, patient was in post-ictal state. Neurology consulted, suspect seizure secondary to GBM. Previous EEG imaging demonstrated seizure foci at GBM lesion.  - Neurology consulted  - Levetiracetam 1250 mg BID  - AED level of keppra pending  - dexamethasone 2 mg bid x 7days, followed by 2 mg qday for 7 days  - omeprazole 20 mg qday for duration of dexamethasone treatment  - If another seizure occurs, give 300 mg Vimpat IV load and 100 mg Vimpat bid     Glioblastoma Multiforme  Left frontoparietal brain glioblastoma status post resection 2/23/2021. Seen by radiation oncology 3/24 recommended XRT and chemo radiation. However subsequently admitted for resp failure. Hem/onc concerned that she might not be a candidate for chemotherapy or radiation currently due to poor performance status. In June, will initiate temozolomide at adjuvant-dosing  - 1 month follow up with Dr. Baker  - Completed 15 sessions of radiation on 5/27     Acute hypoxic respiratory failure   Recent history of Presumed PJP Pneumonia  Continue supplemental O2 as needed, due to steroids will continue home dose of bactrim.  - Supplemental O2 PRN  - PTA bactrim     Urinary Retention  Uncomplicated UTI  Patient without dysuria and presented with right flank pain. UA in ED with leuk esterase and WBC elevated. Afebrile and CBC without leukocytosis. Awaiting culture results.Continues to have  intermittent urinary retention with need for catheterization, if this continues will need mccray placement.  - CTX for 5 days     Major depressive disorder, recurrent  Anxiety  Follows with psychiatry and psychology as outpatint.  Saw health psychology during admission for GBM. Seeing health psychology while at TCU.   - Seroquel 25 mg BID + 50 mg at bedtime  - Ativan 0.5 mg q4h PRN  - Scheduled doxepin PRN at night for sleep  - c/t PTA Buspar  - PTA prozac 60 mg     Bilateral lower extremity DVT  Right retroperitoneal hematoma   On 3/29 at outside hospital patient's O2 needs increased, duplex ultrasound revealed bilateral lower extremity DVT. CT PE negative for embolism.  Treated with IV heparin and transitioned to Lovenox 70 mg. On 4/14 this was complicated by retroperitoneal bleed, requiring 4 units of packed red blood cells.  Lovenox was held, and IVC filter was placed on 4/16. Vascular surgery was involved, and a CT abdomen was stable bleed so no surgical intervention was required.  Eventually resumed on prophylactic 40mg of Lovenox twice daily.  -Continue 40 mg Lovenox qday     Constipation  -Continue prior Bowel regimen: linaclotide, mesalamine, MiraLAX, simethicone PRN     Non-severe malnutrition  Previous admission required TPN and nutrition consulted.  - Nutrition consulted.    Hypernatremia  Suspect secondary to decreased oral intake. Encouraging oral intake at this time and monitoring.  - Encourage PO intake     Hyperkalemia (Resolved)  Potassium of 3.2 on 5/27, resolved on 5/28. Continuing to monitor    Hypertension  Started on metoprolol at OSH for sinus tachycardia.  - c/t PTA amlodipine 10 mg qday     Chronic medical problems:  - albuterol PRN  - GERD: back to PTA PO PPI     Diet: Combination Diet Dysphagia Diet Level 2: Mechan Altered; Nectar Thickened Liquids (pre-thickened or use instant food thickener)  Calorie Counts  Snacks/Supplements Adult: Magic Cup; With Meals    Fluids: None  Lines: PIV  DVT  Prophylaxis: Enoxaparin (Lovenox) SQ  Martino Catheter: not present  Code Status: Full Code         Disposition Plan   Expected discharge: 2 - 3 days, recommended to prior living arrangement once antibiotic plan established.  Entered: Marvel Mcdowell MD 05/29/2021, 7:42 AM     The patient's care was discussed with the Attending Physician, Dr. Harris.    Marvel Mcdowell MD  [address]  Please see sign in/sign out for up to date coverage information  ______________________________________________________________________    Interval History   Care team notes reviewed. No acute events overnight. Patient feeling well, looking forward to getting to TCU. No fevers, chills, sweats, or dyspnea.    Data reviewed today: I reviewed all medications, new labs and imaging results over the last 24 hours. I personally reviewed no images or EKG's today.    Physical Exam   Vital Signs: Temp: 96.7  F (35.9  C) Temp src: Oral BP: 120/61 Pulse: 81   Resp: 14 SpO2: 94 % O2 Device: Nasal cannula Oxygen Delivery: 1 LPM  Weight: 0 lbs 0 oz  Constitutional: awake, alert, cooperative, no apparent distress, and appears stated age  Eyes: Lids and lashes normal, pupils equal, round and reactive to light, extra ocular muscles intact, sclera clear, conjunctiva normal  Respiratory: No increased work of breathing, good air exchange, clear to auscultation bilaterally, no wheezing. Fine crackles in posterior bases.  Cardiovascular: Normal apical impulse, regular rate and rhythm, normal S1 and S2, no S3 or S4, and no murmur noted  GI: No scars, normal bowel sounds, soft, non-distended, non-tender, no masses palpated, no hepatosplenomegally  Skin: no bruising or bleeding and normal skin color, texture, turgor  Musculoskeletal: no lower extremity pitting edema present  Neurologic: Awake, alert, oriented to name, place and time.  Cranial nerves II-XII are grossly intact.  Right  pronator drift at baseline. Right motor 4 out of 5, left motor 5 out of 5.  Data   Recent Labs   Lab 05/29/21  0706 05/28/21  0705 05/27/21  0627 05/26/21  2145 05/26/21  1040 05/26/21  1040   WBC  --  6.0 8.7  --   --  9.8   HGB  --  9.1* 8.5*  --   --  12.5   MCV  --  97 96  --   --  97    179 154  --   --  236   NA  --  145* 145*  --   --  141   POTASSIUM  --  3.6 3.2*  --   --  3.6   CHLORIDE  --  115* 119*  --   --  105   CO2  --  24 21  --   --  25   BUN  --  8 10  --   --  17   CR  --  0.40* 0.38* 0.40*   < > 0.58   ANIONGAP  --  6 5  --   --  11   ESTIVEN  --  9.0 6.7*  --   --  9.6   GLC  --  130* 120*  --   --  132*   ALBUMIN  --   --   --   --   --  3.3*   PROTTOTAL  --   --   --   --   --  6.8   BILITOTAL  --   --   --   --   --  0.5   ALKPHOS  --   --   --   --   --  106   ALT  --   --   --   --   --  31   AST  --   --   --   --   --  23    < > = values in this interval not displayed.     No results found for this or any previous visit (from the past 24 hour(s)).

## 2021-05-29 NOTE — PLAN OF CARE
Status: Hx GBM s/p resection in 3/2021, admitted after having a seizure at the TCU  Vitals: VSS on 1-2L NC; dyspnea w/ exertion not OOB this shift  Neuros: AO x 3, disoriented to year/date oriented when given choices. Slow speech, mild word-finding difficulty. BUE tremors, slight RUE drift. 5/5 BUE, 4/5 BLE.   IV: PIV SL  Resp/trach: Dyspnea on exertion and tachycardic/ tachypneic with activity  Diet: DD2 w/ nectar thick liquids, usama counts. Swallows pills well with thickened liquids. Nutrition needs a weight when OOB.   Bowel status: No BM this shift, loose BM x2 5/28 hold BM meds  : Voiding spontaneously via bedpan.   Skin: Bruising throughout  Pain: Complains of headache   Activity: A2 w/ GB, No OOB this shift. Turn & repo Q2.   Social: Son at bedside, supportive  Plan: Eventual discharge back to TCU. Continue to monitor and follow POC.

## 2021-05-29 NOTE — PROGRESS NOTES
Calorie Count  Intake recorded for: 5/28  Total Kcals: 0 Total Protein: 0g  Kcals from Hospital Food: 0   Protein: 0g  Kcals from Outside Food (average):0 Protein: 0g  # Meals Ordered from Kitchen: 2 meals  # Meals Recorded: No food intake recorded  # Supplements Recorded: No intake recorded    Note: Per Epic food record, pt consumed 100% at 6pm but not enough information was given to calculate calories/protein.

## 2021-05-29 NOTE — PLAN OF CARE
Status: Hx GBM s/p resection in 3/2021, admitted after having a seizure at the TCU  Vitals: VSS on 1-2L NC; dyspnea w/ exertion. Attempted to remove O2, sats dropped to 88-89%  Neuros: AO x 3, disoriented to year/date. Slow speech, mild word-finding difficulty. BUE tremors, slight RUE drift. 5/5 BUE, 4/5 BLE.   IV: PIV SL  Resp/trach: Dyspnea on exertion and tachycardic/ tachypneic with activity  Diet: DD2 w/ nectar thick liquids, usama counts. Swallows pills well with thickened liquids. Nutrition needs a weight when OOB.   Bowel status: No BM this shift, loose BM x2 this AM.  : Voiding spontaneously via bedpan.   Skin: Bruising throughout  Pain: PRN Tylenol x 1.    Activity: A2 w/ GB, No OOB this shift. Turn & repo Q2.   Social: Son at bedside, supportive  Plan: Eventual discharge back to TCU. Continue to monitor and follow POC.

## 2021-05-29 NOTE — PLAN OF CARE
Status: Hx GBM s/p resection in 3/2021, admitted after having a seizure at the TCU  Vitals: VSS on 1-2L NC  Neuros: Alert, D/O time. Slow speech, mild word-finding difficulty. Slight RUE drift. BUE tremors. 5/5 strengths with generalized weakness  IV: PIV was removed today as was not flushing, requesting an order for no PIV  Labs/Electrolytes: K+ 3.3 today  Resp/trach: Dyspnea on exertion   Diet: DD2 with nectar thick liquids. Good PO ate 100% of breakfast. Needs help ordering. Pills whole with applesauce  Bowel status: BM x2 today  : Voiding spont with intermittent incontinence  Skin: Bruising throughout  Pain: Denies  Activity: Up A2/GB. Turn/repo Q2h  Plan: Continue to monitor and follow POC

## 2021-05-30 ENCOUNTER — APPOINTMENT (OUTPATIENT)
Dept: PHYSICAL THERAPY | Facility: CLINIC | Age: 76
DRG: 054 | End: 2021-05-30
Payer: MEDICARE

## 2021-05-30 PROCEDURE — 97530 THERAPEUTIC ACTIVITIES: CPT | Mod: GP

## 2021-05-30 PROCEDURE — 99233 SBSQ HOSP IP/OBS HIGH 50: CPT | Mod: GC | Performed by: STUDENT IN AN ORGANIZED HEALTH CARE EDUCATION/TRAINING PROGRAM

## 2021-05-30 PROCEDURE — 120N000002 HC R&B MED SURG/OB UMMC

## 2021-05-30 PROCEDURE — 250N000012 HC RX MED GY IP 250 OP 636 PS 637: Performed by: STUDENT IN AN ORGANIZED HEALTH CARE EDUCATION/TRAINING PROGRAM

## 2021-05-30 PROCEDURE — 250N000013 HC RX MED GY IP 250 OP 250 PS 637: Performed by: STUDENT IN AN ORGANIZED HEALTH CARE EDUCATION/TRAINING PROGRAM

## 2021-05-30 PROCEDURE — 97116 GAIT TRAINING THERAPY: CPT | Mod: GP

## 2021-05-30 PROCEDURE — 250N000011 HC RX IP 250 OP 636: Performed by: STUDENT IN AN ORGANIZED HEALTH CARE EDUCATION/TRAINING PROGRAM

## 2021-05-30 RX ADMIN — QUETIAPINE FUMARATE 25 MG: 25 TABLET ORAL at 09:05

## 2021-05-30 RX ADMIN — ENOXAPARIN SODIUM 40 MG: 40 INJECTION SUBCUTANEOUS at 21:57

## 2021-05-30 RX ADMIN — DEXAMETHASONE 2 MG: 2 TABLET ORAL at 19:56

## 2021-05-30 RX ADMIN — CEFDINIR 300 MG: 300 CAPSULE ORAL at 09:09

## 2021-05-30 RX ADMIN — DEXAMETHASONE 2 MG: 2 TABLET ORAL at 09:08

## 2021-05-30 RX ADMIN — LEVETIRACETAM 1250 MG: 750 TABLET, FILM COATED ORAL at 09:05

## 2021-05-30 RX ADMIN — SULFAMETHOXAZOLE AND TRIMETHOPRIM 1 TABLET: 400; 80 TABLET ORAL at 09:05

## 2021-05-30 RX ADMIN — QUETIAPINE 50 MG: 50 TABLET ORAL at 21:50

## 2021-05-30 RX ADMIN — LINACLOTIDE 290 MCG: 145 CAPSULE, GELATIN COATED ORAL at 09:08

## 2021-05-30 RX ADMIN — CALCIUM POLYCARBOPHIL 625 MG: 625 TABLET, FILM COATED ORAL at 09:06

## 2021-05-30 RX ADMIN — BUSPIRONE HYDROCHLORIDE 30 MG: 10 TABLET ORAL at 19:56

## 2021-05-30 RX ADMIN — BUSPIRONE HYDROCHLORIDE 30 MG: 10 TABLET ORAL at 09:06

## 2021-05-30 RX ADMIN — LEVETIRACETAM 1250 MG: 750 TABLET, FILM COATED ORAL at 19:56

## 2021-05-30 RX ADMIN — ACETAMINOPHEN 650 MG: 325 TABLET, FILM COATED ORAL at 03:15

## 2021-05-30 RX ADMIN — QUETIAPINE FUMARATE 25 MG: 25 TABLET ORAL at 19:55

## 2021-05-30 RX ADMIN — CEFDINIR 300 MG: 300 CAPSULE ORAL at 19:55

## 2021-05-30 RX ADMIN — PANTOPRAZOLE SODIUM 40 MG: 40 TABLET, DELAYED RELEASE ORAL at 16:08

## 2021-05-30 RX ADMIN — DOXEPIN HYDROCHLORIDE 3 MG: 10 SOLUTION ORAL at 21:50

## 2021-05-30 RX ADMIN — FLUOXETINE HYDROCHLORIDE 60 MG: 40 CAPSULE ORAL at 09:05

## 2021-05-30 RX ADMIN — PANTOPRAZOLE SODIUM 40 MG: 40 TABLET, DELAYED RELEASE ORAL at 09:05

## 2021-05-30 RX ADMIN — AMLODIPINE BESYLATE 5 MG: 5 TABLET ORAL at 09:06

## 2021-05-30 RX ADMIN — FUROSEMIDE 40 MG: 20 TABLET ORAL at 09:05

## 2021-05-30 ASSESSMENT — ACTIVITIES OF DAILY LIVING (ADL)
ADLS_ACUITY_SCORE: 19

## 2021-05-30 NOTE — PLAN OF CARE
[id number]  Status: Hx GBM s/p resection in 3/2021, admitted after having a seizure at the TCU  Vitals: VSS on room air  Neuros: A&O x 3, disoriented to time. Slow speech. intermittent BUE tremors, slight RUE drift. 5/4 strength w/ generalized weakness  IV: Left PIV SL.  Resp/trach: Dyspnea on exertion and tachycardic/ tachypneic with activity  Diet: DD2 w/ nectar thick liquids, usama counts. Swallows pills well with thickened liquids.    Bowel status:  LBM 5/29  : Voiding spontaneously but refused commode and wanted bedpan. Diaper on.   Skin: Bruising throughout  Pain: tylenol X1 for pain.  Activity: A2 w/ GB, Turn & repo Q2.   Plan:Discharge to TCU. Continue to monitor per orders and follow POC.

## 2021-05-30 NOTE — PLAN OF CARE
Status: Hx GBM s/p resection in 3/2021, admitted after having a seizure at the TCU  Vitals: VSS on RA  Neuros: Alert, D/O time. Slow speech, mild word-finding difficulty. Slight RUE drift. Intermittent tremors. 5/5 strengths with generalized weakness  IV: PIV SL  Resp/trach: Dyspnea on exertion  Diet: DD2 with nectar thick liquids and usama counts. Ate 100% of breakfast. Currently eating food brought from home, son aware to track PO intake as best as possible  Bowel status: BM today  : Voiding spontaneously via commode or bedpan  Skin: Bruising throughout  Pain: Denies  Activity: Up A2/GB. Turn/repo Q2h  Social: Son here today  Plan: Continue to monitor and follow POC. Discharge pending placement at TCU

## 2021-05-30 NOTE — PROGRESS NOTES
Calorie Count  Intake recorded for: 5/29  Total Kcals: 1241 Total Protein: 60g  Kcals from Hospital Food: 1241   Protein: 60g  Kcals from Outside Food (average):0  Protein: 0g  # Meals Ordered from Kitchen: 2 meals  # Meals Recorded: 2 meals (First - 100% omelet, 50% coffee)    (Second- 100% pudding, hot beef & mashed potatoes w/ gravy, squash, green beans w/ butter)  # Supplements Recorded: 100% 1 Magic Shake

## 2021-05-30 NOTE — PLAN OF CARE
Status: Hx GBM s/p resection in 3/2021, admitted after having a seizure at the TCU  Vitals: VSS on 1-2L NC  Neuros: AO x 3, disoriented to year/date. Slow speech. BUE tremors, slight RUE drift. 5/5 strength w/ generalized weakness  IV: No PIV in place  Resp/trach: Dyspnea on exertion and tachycardic/ tachypneic with activity  Diet: DD2 w/ nectar thick liquids, usama counts. Swallows pills well with thickened liquids.    Bowel status: Loose BM this afternoon via bedpan  : Voiding spontaneously via bedpan.   Skin: Bruising throughout  Pain: Denies  Activity: A2 w/ GB, Turn & repo Q2.   Plan: Eventual discharge back to TCU. Continue to monitor per orders and follow POC.     **[id number]

## 2021-05-30 NOTE — PROGRESS NOTES
1900- 2300 shift  Status: Hx GBM s/p resection in 3/2021, admitted after having a seizure at the TCU  Vitals: VSS on room air  Neuros: AO x 3, disoriented to time. Slow speech. intermittentBUE tremors, slight RUE drift. 5/4 strength w/ generalized weakness  IV: New PIV placed SL.  Resp/trach: Dyspnea on exertion and tachycardic/ tachypneic with activity  Diet: DD2 w/ nectar thick liquids, usama counts. Swallows pills well with thickened liquids.    Bowel status:  LBM 5/29  : Voiding spontaneously via commode, diaper on.   Skin: Bruising throughout  Pain: Denies  Activity: A2 w/ GB, Turn & repo Q2.   Plan: Eventual discharge back to TCU. Continue to monitor per orders and follow POC.

## 2021-05-30 NOTE — PROGRESS NOTES
[facility]    Progress Note - Markianna 3 Service        Date of Admission:  5/26/2021    Assessment & Plan       Olga Bailey is a 75 year old female who has a history of Glioblastoma s/p resection in 03/2021, anxiety, depression, and HTN and is admitted after having a seizure at the TCU.     Today:  - Currently medically stable for discharge. Will touch base with SW to finalize TCU placement. Several referrals were made and pending response.  - Will complete UTI treatment course tomorrow (5/31) with PO Cefidinir    Seizure 2/2 GBM  Previous seizure on 5/11 during hospitalization. Has been on low dose Levetiracetam up to 750 mg BD at discharge. On arrival, patient was in post-ictal state. Neurology consulted, suspect seizure secondary to GBM. Previous EEG imaging demonstrated seizure foci at GBM lesion.  - Neurology consulted  - Levetiracetam 1250 mg BID  - AED level of keppra pending  - dexamethasone 2 mg bid x 7days, followed by 2 mg qday for 7 days  - omeprazole 20 mg qday for duration of dexamethasone treatment  - If another seizure occurs, give 300 mg Vimpat IV load and 100 mg Vimpat bid     Glioblastoma Multiforme  Left frontoparietal brain glioblastoma status post resection 2/23/2021. Seen by radiation oncology 3/24 recommended XRT and chemo radiation. However subsequently admitted for resp failure. Hem/onc concerned that she might not be a candidate for chemotherapy or radiation currently due to poor performance status. In June, will initiate temozolomide at adjuvant-dosing  - 1 month follow up with Dr. Baker  - Completed 15 sessions of radiation on 5/27     Acute hypoxic respiratory failure   Recent history of Presumed PJP Pneumonia  Continue supplemental O2 as needed, due to steroids will continue home dose of bactrim.  - Supplemental O2 PRN  - PTA bactrim     Urinary Retention  Uncomplicated UTI  Patient without dysuria and presented with right flank pain.  UA in ED with leuk esterase and WBC elevated. Afebrile and CBC without leukocytosis. Awaiting culture results.Continues to have intermittent urinary retention with need for catheterization, if this continues will need mccray placement.  - Will complete UTI treatment course tomorrow (5/31) with PO Cefidinir     Major depressive disorder, recurrent  Anxiety  Follows with psychiatry and psychology as outpatint.  Saw health psychology during admission for GBM. Seeing health psychology while at TCU.   - Seroquel 25 mg BID + 50 mg at bedtime  - Ativan 0.5 mg q4h PRN  - Scheduled doxepin PRN at night for sleep  - c/t PTA Buspar  - PTA prozac 60 mg     Bilateral lower extremity DVT  Right retroperitoneal hematoma   On 3/29 at outside hospital patient's O2 needs increased, duplex ultrasound revealed bilateral lower extremity DVT. CT PE negative for embolism.  Treated with IV heparin and transitioned to Lovenox 70 mg. On 4/14 this was complicated by retroperitoneal bleed, requiring 4 units of packed red blood cells.  Lovenox was held, and IVC filter was placed on 4/16. Vascular surgery was involved, and a CT abdomen was stable bleed so no surgical intervention was required.  Eventually resumed on prophylactic 40mg of Lovenox twice daily.  - Continue 40 mg Lovenox qday     Constipation  - Continue prior Bowel regimen: linaclotide, mesalamine, MiraLAX, simethicone PRN     Non-severe malnutrition  Previous admission required TPN and nutrition consulted.  - Nutrition consulted.    Hypernatremia  Suspect secondary to decreased oral intake. Encouraging oral intake at this time and monitoring.  - Encourage PO intake     Hyperkalemia (Resolved)  Potassium of 3.2 on 5/27, resolved on 5/28. Continuing to monitor    Hypertension  Started on metoprolol at OSH for sinus tachycardia.  - c/t PTA amlodipine 10 mg qday     Chronic medical problems:  - albuterol PRN  - GERD: back to PTA PO PPI     Diet: Combination Diet Dysphagia Diet Level 2:  Mechan Altered; Nectar Thickened Liquids (pre-thickened or use instant food thickener)  Calorie Counts  Snacks/Supplements Adult: Magic Cup; With Meals    Fluids: None  Lines: PIV  DVT Prophylaxis: Enoxaparin (Lovenox) SQ  Martino Catheter: not present  Code Status: Full Code         Disposition Plan   Expected discharge: 2 - 3 days, recommended to prior living arrangement once antibiotic plan established.  Entered: Ahmet Bruno MD 05/30/2021, 11:31 AM     The patient's care was discussed with the Attending Physician, Dr. Harris.    Ahmet Bruno MD  [address]  Please see sign in/sign out for up to date coverage information  ______________________________________________________________________    Interval History   Nurse note reviewed. No acute events overnight. States that she is doing well and eating almost all of her breakfast. Denies any fever, chest pain, shortness of breath, cough, abdominal pain or changes in bowel movement/urination.    Data reviewed today: I reviewed all medications, new labs and imaging results over the last 24 hours. I personally reviewed no images or EKG's today.    Physical Exam   Vital Signs: Temp: 95.5  F (35.3  C) Temp src: Oral BP: 139/84 Pulse: 87   Resp: 16 SpO2: 94 % O2 Device: None (Room air) Oxygen Delivery: 2 LPM  Weight: 154 lbs 6.4 oz  Constitutional: awake, alert, cooperative, no apparent distress, and appears stated age  Eyes: Lids and lashes normal, pupils equal, round and reactive to light, extra ocular muscles intact, sclera clear, conjunctiva normal  Respiratory: No increased work of breathing, good air exchange, clear to auscultation bilaterally, no wheezing. Fine crackles in posterior bases.  Cardiovascular: Normal apical impulse, regular rate and rhythm, normal S1 and S2, no S3 or S4, and no murmur noted  GI: No scars, normal bowel sounds, soft, non-distended, non-tender, no masses  palpated, no hepatosplenomegally  Skin: no bruising or bleeding and normal skin color, texture, turgor  Musculoskeletal: no lower extremity pitting edema present  Neurologic: Awake, alert, oriented to name, place and time.  Cranial nerves II-XII are grossly intact.  Right pronator drift at baseline. Right motor 4 out of 5, left motor 5 out of 5.  Data   Recent Labs   Lab 05/29/21  1610 05/29/21  0706 05/28/21  0705 05/27/21  0627 05/26/21  1040 05/26/21  1040   WBC  --   --  6.0 8.7  --  9.8   HGB  --   --  9.1* 8.5*  --  12.5   MCV  --   --  97 96  --  97   PLT  --  204 179 154  --  236   NA  --  145* 145* 145*  --  141   POTASSIUM 3.4 3.3* 3.6 3.2*  --  3.6   CHLORIDE  --  112* 115* 119*  --  105   CO2  --  26 24 21  --  25   BUN  --  8 8 10  --  17   CR  --  0.43*  0.46* 0.40* 0.38*   < > 0.58   ANIONGAP  --  7 6 5  --  11   ESTIVEN  --  8.9 9.0 6.7*  --  9.6   GLC  --  118* 130* 120*  --  132*   ALBUMIN  --   --   --   --   --  3.3*   PROTTOTAL  --   --   --   --   --  6.8   BILITOTAL  --   --   --   --   --  0.5   ALKPHOS  --   --   --   --   --  106   ALT  --   --   --   --   --  31   AST  --   --   --   --   --  23    < > = values in this interval not displayed.     No results found for this or any previous visit (from the past 24 hour(s)).

## 2021-05-31 ENCOUNTER — ONCOLOGY VISIT (OUTPATIENT)
Dept: RADIATION ONCOLOGY | Facility: CLINIC | Age: 76
End: 2021-05-31

## 2021-05-31 LAB
ANION GAP SERPL CALCULATED.3IONS-SCNC: 7 MMOL/L (ref 3–14)
BUN SERPL-MCNC: 12 MG/DL (ref 7–30)
CALCIUM SERPL-MCNC: 9 MG/DL (ref 8.5–10.1)
CHLORIDE SERPL-SCNC: 106 MMOL/L (ref 94–109)
CO2 SERPL-SCNC: 28 MMOL/L (ref 20–32)
CREAT SERPL-MCNC: 0.5 MG/DL (ref 0.52–1.04)
ERYTHROCYTE [DISTWIDTH] IN BLOOD BY AUTOMATED COUNT: 17.1 % (ref 10–15)
GFR SERPL CREATININE-BSD FRML MDRD: >90 ML/MIN/{1.73_M2}
GLUCOSE BLDC GLUCOMTR-MCNC: 107 MG/DL (ref 70–99)
GLUCOSE BLDC GLUCOMTR-MCNC: 113 MG/DL (ref 70–99)
GLUCOSE SERPL-MCNC: 106 MG/DL (ref 70–99)
HCT VFR BLD AUTO: 31.5 % (ref 35–47)
HGB BLD-MCNC: 10.1 G/DL (ref 11.7–15.7)
MCH RBC QN AUTO: 30.7 PG (ref 26.5–33)
MCHC RBC AUTO-ENTMCNC: 32.1 G/DL (ref 31.5–36.5)
MCV RBC AUTO: 96 FL (ref 78–100)
PLATELET # BLD AUTO: 262 10E9/L (ref 150–450)
POTASSIUM SERPL-SCNC: 3.8 MMOL/L (ref 3.4–5.3)
RBC # BLD AUTO: 3.29 10E12/L (ref 3.8–5.2)
SODIUM SERPL-SCNC: 141 MMOL/L (ref 133–144)
WBC # BLD AUTO: 8 10E9/L (ref 4–11)

## 2021-05-31 PROCEDURE — 250N000013 HC RX MED GY IP 250 OP 250 PS 637: Performed by: STUDENT IN AN ORGANIZED HEALTH CARE EDUCATION/TRAINING PROGRAM

## 2021-05-31 PROCEDURE — 120N000002 HC R&B MED SURG/OB UMMC

## 2021-05-31 PROCEDURE — 99233 SBSQ HOSP IP/OBS HIGH 50: CPT | Mod: GC | Performed by: STUDENT IN AN ORGANIZED HEALTH CARE EDUCATION/TRAINING PROGRAM

## 2021-05-31 PROCEDURE — 80048 BASIC METABOLIC PNL TOTAL CA: CPT | Performed by: STUDENT IN AN ORGANIZED HEALTH CARE EDUCATION/TRAINING PROGRAM

## 2021-05-31 PROCEDURE — 999N000128 HC STATISTIC PERIPHERAL IV START W/O US GUIDANCE

## 2021-05-31 PROCEDURE — 85027 COMPLETE CBC AUTOMATED: CPT | Performed by: STUDENT IN AN ORGANIZED HEALTH CARE EDUCATION/TRAINING PROGRAM

## 2021-05-31 PROCEDURE — 36415 COLL VENOUS BLD VENIPUNCTURE: CPT | Performed by: STUDENT IN AN ORGANIZED HEALTH CARE EDUCATION/TRAINING PROGRAM

## 2021-05-31 PROCEDURE — 250N000012 HC RX MED GY IP 250 OP 636 PS 637: Performed by: STUDENT IN AN ORGANIZED HEALTH CARE EDUCATION/TRAINING PROGRAM

## 2021-05-31 PROCEDURE — 250N000011 HC RX IP 250 OP 636: Performed by: STUDENT IN AN ORGANIZED HEALTH CARE EDUCATION/TRAINING PROGRAM

## 2021-05-31 PROCEDURE — 82962 GLUCOSE BLOOD TEST: CPT

## 2021-05-31 RX ADMIN — FLUOXETINE HYDROCHLORIDE 60 MG: 40 CAPSULE ORAL at 09:29

## 2021-05-31 RX ADMIN — CEFDINIR 300 MG: 300 CAPSULE ORAL at 09:32

## 2021-05-31 RX ADMIN — LINACLOTIDE 290 MCG: 145 CAPSULE, GELATIN COATED ORAL at 09:32

## 2021-05-31 RX ADMIN — DEXAMETHASONE 2 MG: 2 TABLET ORAL at 09:31

## 2021-05-31 RX ADMIN — QUETIAPINE FUMARATE 25 MG: 25 TABLET ORAL at 09:30

## 2021-05-31 RX ADMIN — DEXAMETHASONE 2 MG: 2 TABLET ORAL at 19:55

## 2021-05-31 RX ADMIN — BUSPIRONE HYDROCHLORIDE 30 MG: 10 TABLET ORAL at 19:55

## 2021-05-31 RX ADMIN — DOXEPIN HYDROCHLORIDE 3 MG: 10 SOLUTION ORAL at 22:13

## 2021-05-31 RX ADMIN — QUETIAPINE FUMARATE 25 MG: 25 TABLET ORAL at 19:55

## 2021-05-31 RX ADMIN — CALCIUM POLYCARBOPHIL 625 MG: 625 TABLET, FILM COATED ORAL at 09:30

## 2021-05-31 RX ADMIN — LEVETIRACETAM 1250 MG: 750 TABLET, FILM COATED ORAL at 09:31

## 2021-05-31 RX ADMIN — PANTOPRAZOLE SODIUM 40 MG: 40 TABLET, DELAYED RELEASE ORAL at 09:31

## 2021-05-31 RX ADMIN — QUETIAPINE 50 MG: 50 TABLET ORAL at 22:12

## 2021-05-31 RX ADMIN — LEVETIRACETAM 1250 MG: 750 TABLET, FILM COATED ORAL at 19:55

## 2021-05-31 RX ADMIN — PANTOPRAZOLE SODIUM 40 MG: 40 TABLET, DELAYED RELEASE ORAL at 16:18

## 2021-05-31 RX ADMIN — BUSPIRONE HYDROCHLORIDE 30 MG: 10 TABLET ORAL at 09:30

## 2021-05-31 RX ADMIN — FUROSEMIDE 40 MG: 20 TABLET ORAL at 09:31

## 2021-05-31 RX ADMIN — SULFAMETHOXAZOLE AND TRIMETHOPRIM 1 TABLET: 400; 80 TABLET ORAL at 09:32

## 2021-05-31 RX ADMIN — CEFDINIR 300 MG: 300 CAPSULE ORAL at 20:20

## 2021-05-31 RX ADMIN — ENOXAPARIN SODIUM 40 MG: 40 INJECTION SUBCUTANEOUS at 21:20

## 2021-05-31 RX ADMIN — AMLODIPINE BESYLATE 5 MG: 5 TABLET ORAL at 09:32

## 2021-05-31 ASSESSMENT — VISUAL ACUITY
OU: BASELINE;GLASSES
OU: BASELINE;GLASSES

## 2021-05-31 ASSESSMENT — ACTIVITIES OF DAILY LIVING (ADL)
ADLS_ACUITY_SCORE: 23
ADLS_ACUITY_SCORE: 19

## 2021-05-31 NOTE — PROGRESS NOTES
Calorie Count  Intake recorded for: 5/30  Total Kcals: 526 Total Protein: 20g  Kcals from Hospital Food: 526  Protein: 20g  Kcals from Outside Food (average):0 Protein: 0g  # Meals Ordered from Kitchen: 1 meal   # Meals Recorded: 1 meal (First - 100% plain omelet, Bulgarian toast w/ syrup, coffee, applesauce)  # Supplements Recorded: 0

## 2021-05-31 NOTE — PLAN OF CARE
"/84   Pulse 95   Temp 96.8  F (36  C) (Oral)   Resp 16   Ht 1.6 m (5' 3\")   Wt 70 kg (154 lb 6.4 oz)   SpO2 97%   BMI 27.35 kg/m      Time: 8825-1101  R. of admission/Status: Glioblastoma s/p resection in 03/2021, admitted on 05/26/21 after having a seizure at TCU. Hx of anxiety, depression, and HTN  Neuro:  A&Ox3, disoriented to time, anxious and tremors noted on UE. Mild word-finding difficulty, slow speech. 5/5 strength with generalized weakness.   Activity: assist of two person, no out of bed this shift.   Pain: denied   Cardiac: wnl  Respiratory: room air, denied SOB or coughing. CPAP at night. LS clear all lobes.   GI/: incontinent of bowel twice this shift. Incontinent bladder x 2.   Diet: DD2, nectar thick for fluid. Ate home food for dinner.   Skin: no skin deficit. Some bruises noted on bilateral shins.   LDAs: PIV saline locked.   Labs/Imaging: K+ 3.4 from yesterday, recheck in the morning.   New change this shift: None   Plan: continue on usama count, neuro q 4, vs q 8. Waiting TCU placement.     "

## 2021-05-31 NOTE — PROGRESS NOTES
.Care Management Follow Up    Length of Stay (days): 5    Expected Discharge Date: 05/29/21     Concerns to be Addressed: Discharge planning      Patient plan of care discussed at interdisciplinary rounds: Yes    Anticipated Discharge Disposition:  TCU TBD     Anticipated Discharge Services:  IMM, PAS, Transportation   Anticipated Discharge DME:  O2    Patient/family educated on Medicare website which has current facility and service quality ratings:  Yes  Education Provided on the Discharge Plan: Yes   Patient/Family in Agreement with the Plan:  Yes    Referrals Placed by CM/SW:  Post Acute Facilities   Private pay costs discussed: transportation costs    Additional Information:  CAMRON paged by MD explaining is currently not on chemo and would like SW to reach out to pending facilities to see if able to accept pt. CAMRON contacted Mya, Admission Coordinator, at [facility] to see if able to accept after clarifying pt is not on active chemo. Per Mya due to pt potentially starting chemo in mid-June facility does not feel she is appropriate.   CAMRON updated MD.    CAMRON will continue to follow for discharge needs.    MACARIO Montague   Weekend   Pager: [phone]

## 2021-05-31 NOTE — PLAN OF CARE
Status: Hx GBM s/p resection in 3/2021, admitted after having a seizure at the TCU  Vitals: VSS on RA  Neuros: A&Ox4 overnight, baseline d/o to time. Slow speech. Intermittent tremors 5/5 strengths with generalized weakness  IV: PIV SL  Resp/trach: Dyspnea on exertion  Diet: DD2 with nectar thick liquids. Claudio counts.   Bowel status: BS+ no BM overnight. Per report fecal incontinence  : voids spontaneously via bedpan or commode  Skin: generalized bruising  Pain: denies  Activity: A2 with GB  Plan: Pending TCU placement

## 2021-05-31 NOTE — PROGRESS NOTES
[facility]    Progress Note - Maximilian 3 Service        Date of Admission:  5/26/2021    Assessment & Plan       Olga Bailey is a 75 year old female who has a history of Glioblastoma s/p resection in 03/2021, anxiety, depression, and HTN and is admitted after having a seizure at the TCU.     Today:  - Currently medically stable for discharge. Will touch base with SW to finalize TCU placement. Several referrals were made and pending response.  - Will complete UTI treatment course today(5/31) with PO Cefidinir    Seizure 2/2 GBM  Previous seizure on 5/11 during hospitalization. Has been on low dose Levetiracetam up to 750 mg BD at discharge. On arrival, patient was in post-ictal state. Neurology consulted, suspect seizure secondary to GBM. Previous EEG imaging demonstrated seizure foci at GBM lesion. Levetiracetam level elevated at 49 on arrival, day of seizure. No additional seizures while inpatient.  - Neurology consulted, signed off at this time  - Levetiracetam 1250 mg BID  - dexamethasone 2 mg bid x 7days, followed by 2 mg qday for 7 days  - omeprazole 20 mg qday for duration of dexamethasone treatment  - If another seizure occurs, give 300 mg Vimpat IV load and 100 mg Vimpat bid     Glioblastoma Multiforme  Left frontoparietal brain glioblastoma status post resection 2/23/2021. Seen by radiation oncology 3/24 recommended XRT and chemo radiation. However subsequently admitted for resp failure. Hem/onc was initially concerned that she might not be a candidate for chemotherapy or radiation currently due to poor performance status. In June, will assess for whether or not to initiate temozolomide at adjuvant-dosing  - 1 month follow up with Dr. Baker  - Completed 15 sessions of radiation on 5/27     Acute hypoxic respiratory failure   Recent history of Presumed PJP Pneumonia  Continue supplemental O2 as needed, due to steroids will continue home dose of bactrim.  -  Supplemental O2 PRN  - PTA bactrim     Urinary Retention  Uncomplicated UTI  Patient without dysuria and presented with right flank pain. UA in ED with leuk esterase and WBC elevated. Afebrile and CBC without leukocytosis. Awaiting culture results.Continues to have intermittent urinary retention with need for catheterization, if this continues will need mccray placement.  - Will complete UTI treatment course today (5/31) with PO Cefidinir     Major depressive disorder, recurrent  Anxiety  Follows with psychiatry and psychology as outpatint.  Saw health psychology during admission for GBM. Seeing health psychology while at TCU.   - Seroquel 25 mg BID + 50 mg at bedtime  - Ativan 0.5 mg q4h PRN  - Scheduled doxepin PRN at night for sleep  - c/t PTA Buspar  - PTA prozac 60 mg     Bilateral lower extremity DVT  Right retroperitoneal hematoma   On 3/29 at outside hospital patient's O2 needs increased, duplex ultrasound revealed bilateral lower extremity DVT. CT PE negative for embolism.  Treated with IV heparin and transitioned to Lovenox 70 mg. On 4/14 this was complicated by retroperitoneal bleed, requiring 4 units of packed red blood cells.  Lovenox was held, and IVC filter was placed on 4/16. Vascular surgery was involved, and a CT abdomen was stable bleed so no surgical intervention was required.  Eventually resumed on prophylactic 40mg of Lovenox twice daily.  - Continue 40 mg Lovenox qday     Constipation  - Continue prior Bowel regimen: linaclotide, mesalamine, MiraLAX, simethicone PRN     Non-severe malnutrition  Previous admission required TPN and nutrition consulted.  - Nutrition consulted.    Hypernatremia (Resolved)  Suspect secondary to decreased oral intake. Encouraging oral intake at this time and monitoring.  - Encourage PO intake     Hypokalemia (Resolved)  Potassium of 3.2 on 5/27, resolved on 5/28. Continuing to monitor    Hypertension  Started on metoprolol at OSH for sinus tachycardia.  - c/t PTA  amlodipine 10 mg qday     Chronic medical problems:  - albuterol PRN  - GERD: back to PTA PO PPI     Diet: Combination Diet Dysphagia Diet Level 2: Mechan Altered; Nectar Thickened Liquids (pre-thickened or use instant food thickener)  Snacks/Supplements Adult: Magic Cup; With Meals    Fluids: None  Lines: PIV  DVT Prophylaxis: Enoxaparin (Lovenox) SQ  Martino Catheter: not present  Code Status: Full Code         Disposition Plan   Expected discharge: 2 - 3 days, recommended to prior living arrangement once antibiotic plan established.  Entered: Marvel Mcdowell MD 05/31/2021, 7:12 AM     The patient's care was discussed with the Attending Physician, Dr. Harris.    Marvel Mcdowell MD  [address]  Please see sign in/sign out for up to date coverage information  ______________________________________________________________________    Interval History   Care team notes reviewed, no acute events overnight. Patient continues to be medically stable and able to discharge when TCU is available. Patient is feeling well today, continues to have some hair loss following radiation. Eating and drinking well. Urinating and BMs in bed pan. No fevers, chills, or sweats.    Data reviewed today: I reviewed all medications, new labs and imaging results over the last 24 hours. I personally reviewed no images or EKG's today.    Physical Exam   Vital Signs: Temp: 96  F (35.6  C) Temp src: Oral BP: 131/79 Pulse: 72   Resp: 16 SpO2: 94 % O2 Device: None (Room air)    Weight: 154 lbs 6.4 oz  Constitutional: awake, alert, cooperative, no apparent distress, laying in bed  Eyes: pupils equal, round and reactive to light, extra ocular muscles intact, sclera clear, conjunctiva normal  Respiratory: No increased work of breathing, good air exchange, clear to auscultation bilaterally, no wheezing. Fine crackles in posterior bases.  Cardiovascular: Normal apical impulse,  regular rate and rhythm, and no murmur noted  GI:normal bowel sounds, soft, non-distended, non-tender  Skin: no bruising or bleeding and normal skin color, texture, turgor  Musculoskeletal: Left foot pedal edeam  Neurologic: Awake, alert, oriented to name, place and time.  Cranial nerves II-XII are grossly intact.  Right pronator drift at baseline. Right motor 4 out of 5, left motor 5 out of 5.  Data   Recent Labs   Lab 05/29/21  1610 05/29/21  0706 05/28/21  0705 05/27/21  0627 05/26/21  1040 05/26/21  1040   WBC  --   --  6.0 8.7  --  9.8   HGB  --   --  9.1* 8.5*  --  12.5   MCV  --   --  97 96  --  97   PLT  --  204 179 154  --  236   NA  --  145* 145* 145*  --  141   POTASSIUM 3.4 3.3* 3.6 3.2*  --  3.6   CHLORIDE  --  112* 115* 119*  --  105   CO2  --  26 24 21  --  25   BUN  --  8 8 10  --  17   CR  --  0.43*  0.46* 0.40* 0.38*   < > 0.58   ANIONGAP  --  7 6 5  --  11   ESTIVEN  --  8.9 9.0 6.7*  --  9.6   GLC  --  118* 130* 120*  --  132*   ALBUMIN  --   --   --   --   --  3.3*   PROTTOTAL  --   --   --   --   --  6.8   BILITOTAL  --   --   --   --   --  0.5   ALKPHOS  --   --   --   --   --  106   ALT  --   --   --   --   --  31   AST  --   --   --   --   --  23    < > = values in this interval not displayed.     No results found for this or any previous visit (from the past 24 hour(s)).

## 2021-05-31 NOTE — PLAN OF CARE
Status: Hx GBM s/p resection in 3/2021, admitted after having a seizure at the TCU  Vitals: VSS on RA  Neuros: Alert, D/O time. Slow speech, mild word-finding difficulty. Slight RUE drift. Intermittent tremors. 5/5 strengths with generalized weakness  IV: PIV SL  Resp/trach: Dyspnea on exertion  Diet: DD2 with nectar thick liquids and usama counts. Good appetite  Bowel status: No BM this shift  : Voiding spontaneously via commode or bedpan, intermittent incontinence  Skin: Bruising throughout  Pain: Denies  Activity: Up A2/GB. Turn/repo Q2h. Showered today  Plan: Continue to monitor and follow POC. Pending TCU placement

## 2021-06-01 ENCOUNTER — APPOINTMENT (OUTPATIENT)
Dept: PHYSICAL THERAPY | Facility: CLINIC | Age: 76
DRG: 054 | End: 2021-06-01
Payer: MEDICARE

## 2021-06-01 ENCOUNTER — APPOINTMENT (OUTPATIENT)
Dept: OCCUPATIONAL THERAPY | Facility: CLINIC | Age: 76
DRG: 054 | End: 2021-06-01
Payer: MEDICARE

## 2021-06-01 LAB
BACTERIA SPEC CULT: NO GROWTH
BACTERIA SPEC CULT: NO GROWTH
PLATELET # BLD AUTO: 258 10E9/L (ref 150–450)
POTASSIUM SERPL-SCNC: 3.6 MMOL/L (ref 3.4–5.3)
SPECIMEN SOURCE: NORMAL
SPECIMEN SOURCE: NORMAL

## 2021-06-01 PROCEDURE — 97535 SELF CARE MNGMENT TRAINING: CPT | Mod: GO | Performed by: OCCUPATIONAL THERAPIST

## 2021-06-01 PROCEDURE — 250N000012 HC RX MED GY IP 250 OP 636 PS 637: Performed by: STUDENT IN AN ORGANIZED HEALTH CARE EDUCATION/TRAINING PROGRAM

## 2021-06-01 PROCEDURE — 99232 SBSQ HOSP IP/OBS MODERATE 35: CPT | Mod: GC | Performed by: INTERNAL MEDICINE

## 2021-06-01 PROCEDURE — 250N000011 HC RX IP 250 OP 636: Performed by: STUDENT IN AN ORGANIZED HEALTH CARE EDUCATION/TRAINING PROGRAM

## 2021-06-01 PROCEDURE — 97530 THERAPEUTIC ACTIVITIES: CPT | Mod: GP

## 2021-06-01 PROCEDURE — 36415 COLL VENOUS BLD VENIPUNCTURE: CPT | Performed by: STUDENT IN AN ORGANIZED HEALTH CARE EDUCATION/TRAINING PROGRAM

## 2021-06-01 PROCEDURE — 250N000013 HC RX MED GY IP 250 OP 250 PS 637: Performed by: STUDENT IN AN ORGANIZED HEALTH CARE EDUCATION/TRAINING PROGRAM

## 2021-06-01 PROCEDURE — 97116 GAIT TRAINING THERAPY: CPT | Mod: GP

## 2021-06-01 PROCEDURE — 120N000002 HC R&B MED SURG/OB UMMC

## 2021-06-01 PROCEDURE — 84132 ASSAY OF SERUM POTASSIUM: CPT | Performed by: STUDENT IN AN ORGANIZED HEALTH CARE EDUCATION/TRAINING PROGRAM

## 2021-06-01 PROCEDURE — 85049 AUTOMATED PLATELET COUNT: CPT | Performed by: STUDENT IN AN ORGANIZED HEALTH CARE EDUCATION/TRAINING PROGRAM

## 2021-06-01 RX ADMIN — PANTOPRAZOLE SODIUM 40 MG: 40 TABLET, DELAYED RELEASE ORAL at 08:17

## 2021-06-01 RX ADMIN — DEXAMETHASONE 2 MG: 2 TABLET ORAL at 08:16

## 2021-06-01 RX ADMIN — LINACLOTIDE 290 MCG: 145 CAPSULE, GELATIN COATED ORAL at 08:15

## 2021-06-01 RX ADMIN — DEXAMETHASONE 2 MG: 2 TABLET ORAL at 19:59

## 2021-06-01 RX ADMIN — LEVETIRACETAM 1250 MG: 750 TABLET, FILM COATED ORAL at 19:58

## 2021-06-01 RX ADMIN — BUSPIRONE HYDROCHLORIDE 30 MG: 10 TABLET ORAL at 08:16

## 2021-06-01 RX ADMIN — QUETIAPINE FUMARATE 25 MG: 25 TABLET ORAL at 19:59

## 2021-06-01 RX ADMIN — QUETIAPINE FUMARATE 25 MG: 25 TABLET ORAL at 08:17

## 2021-06-01 RX ADMIN — DOXEPIN HYDROCHLORIDE 3 MG: 10 SOLUTION ORAL at 21:39

## 2021-06-01 RX ADMIN — SULFAMETHOXAZOLE AND TRIMETHOPRIM 1 TABLET: 400; 80 TABLET ORAL at 08:16

## 2021-06-01 RX ADMIN — CALCIUM POLYCARBOPHIL 625 MG: 625 TABLET, FILM COATED ORAL at 08:15

## 2021-06-01 RX ADMIN — ENOXAPARIN SODIUM 40 MG: 40 INJECTION SUBCUTANEOUS at 21:39

## 2021-06-01 RX ADMIN — PANTOPRAZOLE SODIUM 40 MG: 40 TABLET, DELAYED RELEASE ORAL at 16:18

## 2021-06-01 RX ADMIN — ACETAMINOPHEN 650 MG: 325 TABLET, FILM COATED ORAL at 16:18

## 2021-06-01 RX ADMIN — LEVETIRACETAM 1250 MG: 750 TABLET, FILM COATED ORAL at 08:17

## 2021-06-01 RX ADMIN — FUROSEMIDE 40 MG: 20 TABLET ORAL at 08:15

## 2021-06-01 RX ADMIN — QUETIAPINE 50 MG: 50 TABLET ORAL at 21:39

## 2021-06-01 RX ADMIN — BUSPIRONE HYDROCHLORIDE 30 MG: 10 TABLET ORAL at 19:58

## 2021-06-01 RX ADMIN — FLUOXETINE HYDROCHLORIDE 60 MG: 40 CAPSULE ORAL at 08:15

## 2021-06-01 RX ADMIN — AMLODIPINE BESYLATE 5 MG: 5 TABLET ORAL at 08:16

## 2021-06-01 ASSESSMENT — ACTIVITIES OF DAILY LIVING (ADL)
ADLS_ACUITY_SCORE: 23

## 2021-06-01 ASSESSMENT — MIFFLIN-ST. JEOR: SCORE: 1150.42

## 2021-06-01 NOTE — PROGRESS NOTES
Patient transferred to  with all belongings, chart, usama count sheets.     Status: Hx GBM s/p resection in 3/2021, admitted after having a seizure at the TCU  Vitals: VSS on RA  Neuros: Alert, D/O time. Slow speech, mild word-finding difficulty. Slight RUE drift. Intermittent tremors. 4/5 strengths with generalized weakness  IV: PIV replaced this shift, saline locked.  Resp/trach: Lung sounds clear. Per report dyspnea on exertion.   Diet: DD2 with nectar thick liquids and usama counts. Ate 100% of dinner.   Bowel status: Bm x2 this shift .  : Voiding spontaneously via commode or bedpan, intermittent incontinence  Skin: Bruising throughout  Pain: Reports some general aches, declines interventions  Activity: Up A2/GB. Turn/repo Q2h. Up to chair x2 this shift.  Plan: Continue to monitor and follow POC. Transferring to .

## 2021-06-01 NOTE — PLAN OF CARE
"VSS.  A+O except time, forgetful some times \"I have short term memory problem\".   RUE drift otherwise neuros are intact.  No seizures noted this shift, seizure pads in place.  LS clear, dim at the bases.  +BS, +BM x3. Incontinent of stool and urine, uses purewick.  On DD2/nectar thick liquids, good appetite.  Up with 2/GB/walker when walking-OT/PT done. Assist of 1 with pivots.Turns well to sides.  C/o's lower back pain, relieved with tylenol.  Gen skin with bruising, redness in brad rectal area-barrier cream applied.  PIV sl'd.  Continue to monitor gen status and continue with POC. Planned to be dc'd to TCU when available.    "

## 2021-06-01 NOTE — PROGRESS NOTES
Took over cares of pt @ 2300. Pt is AxOx3, disorientated to time. Pt speech is slow with some difficulty word finding. RUE drift with 5/5 strength. AVSS on RA. Pt has CPAP in room but declines use overnight. Pt has been incontinent, agreeable to have purewic placed. PIV is SL. Pt on DD2 diet with nectar thick liquids. Denies pain. Pt is able to shift weight and reposition self in bed. Awaiting confirmation of TCU for discharge. Continue POC.

## 2021-06-01 NOTE — PLAN OF CARE
Time: 4469-5461    Reason for Admission: Hx GBM s/p resection in 3/2021, admitted after having a seizure at the TCU.    Activity: Assist x2 with gait belt and walker. Turn and repo q2.    Neuro: A&O x3. Disoriented to time but pt is aware. Slow speech, mild word-finding difficulty. Slight RUE drift. Intermittent tremors. 5/5 strengths.    GI/: Voiding spontaneously via commode or bedpan, intermittent incontinence    Diet: DD2 and nectar thick liquids, tolerating.     Incisions/Drains: None    IV Access: R PIV saline locked.     Vitals: VSS on RA    Pain: Pt denied any pain this shift.     New changes this shift: Pt transferred to unit at 2115. 2 RN skin assessment completed. Daughter notified of transfer.     Plan: Continue with plan of care.

## 2021-06-01 NOTE — PROGRESS NOTES
[facility]    Progress Note - Maximilian 3 Service        Date of Admission:  5/26/2021    Assessment & Plan       Olga Bailey is a 75 year old female who has a history of Glioblastoma s/p resection in 03/2021, anxiety, depression, and HTN and is admitted after having a seizure at the TCU.     Today:  - Currently medically stable for discharge awaiting TCU placement    Seizure 2/2 GBM  Previous seizure on 5/11 during hospitalization. Has been on low dose Levetiracetam up to 750 mg BD at discharge. On arrival, patient was in post-ictal state. Neurology consulted, suspect seizure secondary to GBM. Previous EEG imaging demonstrated seizure foci at GBM lesion. Levetiracetam level elevated at 49 on arrival, day of seizure. No additional seizures while inpatient.  - Neurology consulted, signed off at this time  - Levetiracetam 1250 mg BID  - dexamethasone 2 mg bid x 7days, followed by 2 mg qday for 7 days  - omeprazole 20 mg qday for duration of dexamethasone treatment  - If another seizure occurs, give 300 mg Vimpat IV load and 100 mg Vimpat bid     Glioblastoma Multiforme  Left frontoparietal brain glioblastoma status post resection 2/23/2021. Seen by radiation oncology 3/24 recommended XRT and chemo radiation. However subsequently admitted for resp failure. Hem/onc was initially concerned that she might not be a candidate for chemotherapy or radiation currently due to poor performance status. In June, will assess for whether or not to initiate temozolomide at adjuvant-dosing  - 1 month follow up with Dr. Baker  - Completed 15 sessions of radiation on 5/27     Recent history of Presumed PJP Pneumonia  Continue supplemental O2 as needed, due to steroids will continue home dose of bactrim.  - PTA bactrim     Urinary Retention  Uncomplicated UTI  Patient without dysuria and presented with right flank pain. UA in ED with leuk esterase and WBC elevated. Afebrile and CBC without  leukocytosis. Awaiting culture results.Continues to have intermittent urinary retention with need for catheterization, if this continues will need mccray placement.  - Will complete UTI treatment course today (5/31) with PO Cefidinir     Major depressive disorder, recurrent  Anxiety  Follows with psychiatry and psychology as outpatint.  Saw health psychology during admission for GBM. Seeing health psychology while at TCU.   - Seroquel 25 mg BID + 50 mg at bedtime  - Ativan 0.5 mg q4h PRN  - Scheduled doxepin PRN at night for sleep  - c/t PTA Buspar  - PTA prozac 60 mg     Bilateral lower extremity DVT  Right retroperitoneal hematoma   On 3/29 at outside hospital patient's O2 needs increased, duplex ultrasound revealed bilateral lower extremity DVT. CT PE negative for embolism.  Treated with IV heparin and transitioned to Lovenox 70 mg. On 4/14 this was complicated by retroperitoneal bleed, requiring 4 units of packed red blood cells.  Lovenox was held, and IVC filter was placed on 4/16. Vascular surgery was involved, and a CT abdomen was stable bleed so no surgical intervention was required.  Eventually resumed on prophylactic 40mg of Lovenox twice daily.  - Continue 40 mg Lovenox qday     Constipation  - Continue prior Bowel regimen: linaclotide, mesalamine, MiraLAX, simethicone PRN     Non-severe malnutrition  Previous admission required TPN and nutrition consulted.  - Nutrition consulted.    Hypernatremia (Resolved)  Suspect secondary to decreased oral intake. Encouraging oral intake at this time and monitoring.  - Encourage PO intake     Hypokalemia (Resolved)  Potassium of 3.2 on 5/27, resolved on 5/28. Continuing to monitor    Hypertension  Started on metoprolol at OSH for sinus tachycardia.  - c/t PTA amlodipine 10 mg qday     Chronic medical problems:  - albuterol PRN  - GERD: back to PTA PO PPI     Diet: Combination Diet Dysphagia Diet Level 2: Mechan Altered; Nectar Thickened Liquids (pre-thickened or use  instant food thickener)  Snacks/Supplements Adult: Magic Cup; With Meals  Room Service    Fluids: None  Lines: PIV  DVT Prophylaxis: Enoxaparin (Lovenox) SQ  Martino Catheter: not present  Code Status: Full Code         Disposition Plan   Expected discharge: 2 - 3 days, recommended to transitional care unit once placement found.  Entered: Sarah Kelsey MD 06/01/2021, 5:21 PM     The patient's care was discussed with the Attending Physician, Dr. Bates.    Sarah Kelsey MD  [address]  Please see sign in/sign out for up to date coverage information  ______________________________________________________________________    Interval History   Nursing notes and vital signs reviewed. Ms. Bailey states she is doing well and has no complaints today.     Data reviewed today: I reviewed all medications, new labs and imaging results over the last 24 hours. I personally reviewed no images or EKG's today.    Physical Exam   Vital Signs: Temp: 96.6  F (35.9  C) Temp src: Temporal BP: 125/70 Pulse: 87   Resp: 20 SpO2: 94 % O2 Device: None (Room air)    Weight: 151 lbs 4.8 oz  Constitutional: awake, alert, cooperative, no apparent distress, sitting in chair  Eyes: pupils equal and round, extra ocular muscles intact, sclera clear, conjunctiva normal  Respiratory: No increased work of breathing, good air exchange, clear to auscultation bilaterally, no wheezing.  Cardiovascular: Normal apical impulse, regular rate and rhythm, and no murmur noted  GI: soft, non-distended, non-tender  Skin: no bruising or bleeding and normal skin color, texture, turgor  Musculoskeletal: no lower extremity edema  Neurologic: Awake, alert, oriented to name, place and time.  Cranial nerves II-XII are grossly intact.  Right pronator drift at baseline. Right motor 4 out of 5, left motor 5 out of 5.  Data   Recent Labs   Lab 06/01/21  0728 05/31/21  0634 05/29/21  1610 05/29/21  0706  05/28/21  0705 05/27/21  0627 05/26/21  1040 05/26/21  1040   WBC  --  8.0  --   --  6.0 8.7  --  9.8   HGB  --  10.1*  --   --  9.1* 8.5*  --  12.5   MCV  --  96  --   --  97 96  --  97    262  --  204 179 154  --  236   NA  --  141  --  145* 145* 145*  --  141   POTASSIUM 3.6 3.8 3.4 3.3* 3.6 3.2*  --  3.6   CHLORIDE  --  106  --  112* 115* 119*  --  105   CO2  --  28  --  26 24 21  --  25   BUN  --  12  --  8 8 10  --  17   CR  --  0.50*  --  0.43*  0.46* 0.40* 0.38*   < > 0.58   ANIONGAP  --  7  --  7 6 5  --  11   ESTIVEN  --  9.0  --  8.9 9.0 6.7*  --  9.6   GLC  --  106*  --  118* 130* 120*  --  132*   ALBUMIN  --   --   --   --   --   --   --  3.3*   PROTTOTAL  --   --   --   --   --   --   --  6.8   BILITOTAL  --   --   --   --   --   --   --  0.5   ALKPHOS  --   --   --   --   --   --   --  106   ALT  --   --   --   --   --   --   --  31   AST  --   --   --   --   --   --   --  23    < > = values in this interval not displayed.     No results found for this or any previous visit (from the past 24 hour(s)).

## 2021-06-01 NOTE — PROGRESS NOTES
Admitted/transferred from:   2 RN skin assessment completed by Diane Alfaro, RN and Rena Christianson RN.  Skin assessment finding: Scattered bruising throughout. Blanchable redness on sacrum.   Interventions/actions: Encourage weight shifting.     Will continue to monitor.

## 2021-06-02 ENCOUNTER — APPOINTMENT (OUTPATIENT)
Dept: PHYSICAL THERAPY | Facility: CLINIC | Age: 76
DRG: 054 | End: 2021-06-02
Payer: MEDICARE

## 2021-06-02 ENCOUNTER — APPOINTMENT (OUTPATIENT)
Dept: OCCUPATIONAL THERAPY | Facility: CLINIC | Age: 76
DRG: 054 | End: 2021-06-02
Payer: MEDICARE

## 2021-06-02 PROCEDURE — 250N000012 HC RX MED GY IP 250 OP 636 PS 637: Performed by: STUDENT IN AN ORGANIZED HEALTH CARE EDUCATION/TRAINING PROGRAM

## 2021-06-02 PROCEDURE — 250N000013 HC RX MED GY IP 250 OP 250 PS 637: Performed by: STUDENT IN AN ORGANIZED HEALTH CARE EDUCATION/TRAINING PROGRAM

## 2021-06-02 PROCEDURE — 97530 THERAPEUTIC ACTIVITIES: CPT | Mod: GP

## 2021-06-02 PROCEDURE — 99232 SBSQ HOSP IP/OBS MODERATE 35: CPT | Mod: GC | Performed by: INTERNAL MEDICINE

## 2021-06-02 PROCEDURE — 97116 GAIT TRAINING THERAPY: CPT | Mod: GP

## 2021-06-02 PROCEDURE — 97535 SELF CARE MNGMENT TRAINING: CPT | Mod: GO

## 2021-06-02 PROCEDURE — 250N000011 HC RX IP 250 OP 636: Performed by: STUDENT IN AN ORGANIZED HEALTH CARE EDUCATION/TRAINING PROGRAM

## 2021-06-02 PROCEDURE — 120N000002 HC R&B MED SURG/OB UMMC

## 2021-06-02 RX ADMIN — BUSPIRONE HYDROCHLORIDE 30 MG: 10 TABLET ORAL at 08:37

## 2021-06-02 RX ADMIN — CALCIUM POLYCARBOPHIL 625 MG: 625 TABLET, FILM COATED ORAL at 08:39

## 2021-06-02 RX ADMIN — PANTOPRAZOLE SODIUM 40 MG: 40 TABLET, DELAYED RELEASE ORAL at 08:37

## 2021-06-02 RX ADMIN — QUETIAPINE FUMARATE 25 MG: 25 TABLET ORAL at 20:27

## 2021-06-02 RX ADMIN — AMLODIPINE BESYLATE 5 MG: 5 TABLET ORAL at 08:39

## 2021-06-02 RX ADMIN — LEVETIRACETAM 1250 MG: 750 TABLET, FILM COATED ORAL at 08:38

## 2021-06-02 RX ADMIN — DEXAMETHASONE 2 MG: 2 TABLET ORAL at 08:37

## 2021-06-02 RX ADMIN — FLUOXETINE HYDROCHLORIDE 60 MG: 40 CAPSULE ORAL at 08:39

## 2021-06-02 RX ADMIN — ENOXAPARIN SODIUM 40 MG: 40 INJECTION SUBCUTANEOUS at 20:33

## 2021-06-02 RX ADMIN — QUETIAPINE 50 MG: 50 TABLET ORAL at 22:11

## 2021-06-02 RX ADMIN — SULFAMETHOXAZOLE AND TRIMETHOPRIM 1 TABLET: 400; 80 TABLET ORAL at 08:39

## 2021-06-02 RX ADMIN — FUROSEMIDE 40 MG: 20 TABLET ORAL at 08:37

## 2021-06-02 RX ADMIN — PANTOPRAZOLE SODIUM 40 MG: 40 TABLET, DELAYED RELEASE ORAL at 16:01

## 2021-06-02 RX ADMIN — LINACLOTIDE 290 MCG: 145 CAPSULE, GELATIN COATED ORAL at 08:39

## 2021-06-02 RX ADMIN — LEVETIRACETAM 1250 MG: 750 TABLET, FILM COATED ORAL at 20:28

## 2021-06-02 RX ADMIN — BUSPIRONE HYDROCHLORIDE 30 MG: 10 TABLET ORAL at 20:28

## 2021-06-02 RX ADMIN — DOXEPIN HYDROCHLORIDE 3 MG: 10 SOLUTION ORAL at 22:11

## 2021-06-02 RX ADMIN — QUETIAPINE FUMARATE 25 MG: 25 TABLET ORAL at 08:38

## 2021-06-02 ASSESSMENT — ACTIVITIES OF DAILY LIVING (ADL)
ADLS_ACUITY_SCORE: 22
ADLS_ACUITY_SCORE: 23
ADLS_ACUITY_SCORE: 22

## 2021-06-02 NOTE — PROGRESS NOTES
[facility]    Progress Note - Maximilian 3 Service        Date of Admission:  5/26/2021    Assessment & Plan       Olga Bailey is a 75 year old female who has a history of Glioblastoma s/p resection in 03/2021, anxiety, depression, and HTN and is admitted after having a seizure at the TCU.     Today:  - Currently medically stable for discharge awaiting TCU placement    Seizure 2/2 GBM  Previous seizure on 5/11 during hospitalization. Has been on low dose Levetiracetam up to 750 mg BD at discharge. On arrival, patient was in post-ictal state. Neurology consulted, suspect seizure secondary to GBM. Previous EEG imaging demonstrated seizure foci at GBM lesion. Levetiracetam level elevated at 49 on arrival, day of seizure. No additional seizures while inpatient.  - Neurology consulted, signed off at this time  - Levetiracetam 1250 mg BID  - dexamethasone 2 mg bid x 7days, followed by 2 mg qday for 7 days  - omeprazole 20 mg qday for duration of dexamethasone treatment  - If another seizure occurs, give 300 mg Vimpat IV load and 100 mg Vimpat bid     Glioblastoma Multiforme  Left frontoparietal brain glioblastoma status post resection 2/23/2021. Seen by radiation oncology 3/24 recommended XRT and chemo radiation. However subsequently admitted for resp failure. Hem/onc was initially concerned that she might not be a candidate for chemotherapy or radiation currently due to poor performance status. In June, will assess for whether or not to initiate temozolomide at adjuvant-dosing  - 1 month follow up with Dr. Baker  - Completed 15 sessions of radiation on 5/27     Recent history of Presumed PJP Pneumonia  Continue supplemental O2 as needed, due to steroids will continue home dose of bactrim.  - PTA bactrim     Urinary Retention  Uncomplicated UTI  Patient without dysuria and presented with right flank pain. UA in ED with leuk esterase and WBC elevated. Afebrile and CBC without  leukocytosis. Awaiting culture results.Continues to have intermittent urinary retention with need for catheterization, if this continues will need mccray placement.  - Will complete UTI treatment course today (5/31) with PO Cefidinir     Major depressive disorder, recurrent  Anxiety  Follows with psychiatry and psychology as outpatint.  Saw health psychology during admission for GBM. Seeing health psychology while at TCU.   - Seroquel 25 mg BID + 50 mg at bedtime  - Ativan 0.5 mg q4h PRN  - Scheduled doxepin PRN at night for sleep  - c/t PTA Buspar  - PTA prozac 60 mg     Bilateral lower extremity DVT  Right retroperitoneal hematoma   On 3/29 at outside hospital patient's O2 needs increased, duplex ultrasound revealed bilateral lower extremity DVT. CT PE negative for embolism.  Treated with IV heparin and transitioned to Lovenox 70 mg. On 4/14 this was complicated by retroperitoneal bleed, requiring 4 units of packed red blood cells.  Lovenox was held, and IVC filter was placed on 4/16. Vascular surgery was involved, and a CT abdomen was stable bleed so no surgical intervention was required.  Eventually resumed on prophylactic 40mg of Lovenox twice daily.  - Continue 40 mg Lovenox qday     Constipation  - Continue prior Bowel regimen: linaclotide, mesalamine, MiraLAX, simethicone PRN     Non-severe malnutrition  Previous admission required TPN and nutrition consulted.  - Nutrition consulted.    Hypernatremia (Resolved)  Suspect secondary to decreased oral intake. Encouraging oral intake at this time and monitoring.  - Encourage PO intake     Hypokalemia (Resolved)  Potassium of 3.2 on 5/27, resolved on 5/28. Continuing to monitor    Hypertension  Started on metoprolol at OSH for sinus tachycardia.  - c/t PTA amlodipine 10 mg qday     Chronic medical problems:  - albuterol PRN  - GERD: back to PTA PO PPI     Diet: Combination Diet Dysphagia Diet Level 2: Mechan Altered; Nectar Thickened Liquids (pre-thickened or use  instant food thickener)  Snacks/Supplements Adult: Magic Cup; With Meals  Room Service    Fluids: None  Lines: PIV  DVT Prophylaxis: Enoxaparin (Lovenox) SQ  Martino Catheter: not present  Code Status: Full Code         Disposition Plan   Expected discharge: 2 - 3 days, recommended to transitional care unit once placement found.  Entered: Sarah Kelsey MD 06/02/2021, 2:17 PM     The patient's care was discussed with the Attending Physician, Dr. Bates.    Sarah Kelsey MD  [address]  Please see sign in/sign out for up to date coverage information  ______________________________________________________________________    Interval History   Nursing notes and vital signs reviewed. Ms. Bailey states she is doing well. Admits she has L foot swelling today. Notes she has intermittently has swelling that is worse in her L foot than R. She denies pain but admits that it is somewhat uncomfortable.      Data reviewed today: I reviewed all medications, new labs and imaging results over the last 24 hours. I personally reviewed no images or EKG's today.    Physical Exam   Vital Signs: Temp: 96.5  F (35.8  C) Temp src: Temporal BP: 132/83 Pulse: 86   Resp: 18 SpO2: 96 % O2 Device: None (Room air)    Weight: 151 lbs 4.8 oz  Constitutional: awake, alert, cooperative, no apparent distress, sitting in chair  Eyes: pupils equal and round, extra ocular muscles intact, sclera clear, conjunctiva normal  Respiratory: No increased work of breathing, good air exchange, clear to auscultation bilaterally, no wheezing.  Cardiovascular: Normal apical impulse, regular rate and rhythm, and no murmur noted  GI: soft, non-distended, non-tender  Skin: no bruising or bleeding and normal skin color, texture, turgor  Musculoskeletal: L foot swelling without extension to ankle, no calf pain, full ROM  Neurologic: Awake, alert, oriented to name, place and time.  Cranial nerves II-XII  are grossly intact.  Right pronator drift at baseline. Right motor 4 out of 5, left motor 5 out of 5.  Data   Recent Labs   Lab 06/01/21  0728 05/31/21  0634 05/29/21  1610 05/29/21  0706 05/28/21  0705 05/27/21  0627   WBC  --  8.0  --   --  6.0 8.7   HGB  --  10.1*  --   --  9.1* 8.5*   MCV  --  96  --   --  97 96    262  --  204 179 154   NA  --  141  --  145* 145* 145*   POTASSIUM 3.6 3.8 3.4 3.3* 3.6 3.2*   CHLORIDE  --  106  --  112* 115* 119*   CO2  --  28  --  26 24 21   BUN  --  12  --  8 8 10   CR  --  0.50*  --  0.43*  0.46* 0.40* 0.38*   ANIONGAP  --  7  --  7 6 5   ESTIVEN  --  9.0  --  8.9 9.0 6.7*   GLC  --  106*  --  118* 130* 120*     No results found for this or any previous visit (from the past 24 hour(s)).

## 2021-06-02 NOTE — PLAN OF CARE
NEURO: Slightly more forgetful and disoriented in AM than in afternoon. Other neuros WNL except BUE tremor and very slight drift of RUE.   RESPIRATORY: LS diminished in bases. JACOBSEN. SpO2>92% on RA.   CARDIO: VSS. Left foot pitting 2+ edema ( Right foot little to no edema).   GI/: BS active. Medium soft, continent BMx1. Voiding incontinently and continently. Fair appetite. Continues on DD2 and nectar thickened liquids.   SKIN: Intact.   ACTIVITY: Up with 1-2 assist to bedside commode x2. Up to chair x1.   PAIN: Denied pain.   LINES: PIV saline locked. Site WNL.   PLAN:  Continue POC. Notify MD with concerns.

## 2021-06-02 NOTE — PROGRESS NOTES
Care Management Follow Up    Length of Stay (days): 7    Expected Discharge Date: 06/03/21     Concerns to be Addressed: discharge planning  Patient plan of care discussed at interdisciplinary rounds: Yes    Anticipated Discharge Disposition: TCU recommended   Anticipated Discharge Services: TBD  Anticipated Discharge DME: TBD    Patient/family educated on Medicare website which has current facility and service quality ratings: yes  Education Provided on the Discharge Plan: yes   Patient/Family in Agreement with the Plan: yes    Referrals Placed by CM/SW:    Pending  [facility]  [address] [place] [address] [phone]  - SW spoke with admissions and they do have private rooms available for $45/day. Admissions requested SW refax referral to review.     [facility]  [address] [place] [address] [phone]  - SW spoke with admissions and they will review pt but they do not anticipate an open bed available as they have a waitlist for TCU.    Declined/ Discontinued  [facility]  [address] [place] [address] [phone]    [facility]  [address] [place] [address] [phone]    Private pay costs discussed: Not applicable    Additional Information:  [facility] following for dispo needs- pt continues to be med ready for discharge to TCU. SW followed up with referrals that are pending.    SW spoke with providing team and confirmed that pt is NOT on oral chemo. D/t rehab needs, providing team is uncertain if pt will receive chemo later this month.     [facility] spoke with pt's daughter, La Nena, and she was persistent on waiting to hear back from [facility] re: acceptance. Per La Nena, if pt does not get accepted, they may want to consider home care.     SW will continue to follow for dispo needs.     Lasha Kruger, MSW, LGSW  Acute Care Float   [facility]  Phone:  [phone]  Pager: [phone]

## 2021-06-02 NOTE — PLAN OF CARE
VSS. Disoriented to time and forgetful. RUE with slight pronator drift, all other neuros intact. Seizure pads in place. Denies pain and nausea. On Dysphagia diet, nectar thick liquids. Incontinent of urine and multiple stools. Turning independently in bed, up with assist of 2. PIV SL. Plan to discharge to TCU when bed is available.

## 2021-06-03 ENCOUNTER — APPOINTMENT (OUTPATIENT)
Dept: OCCUPATIONAL THERAPY | Facility: CLINIC | Age: 76
DRG: 054 | End: 2021-06-03
Payer: MEDICARE

## 2021-06-03 ENCOUNTER — APPOINTMENT (OUTPATIENT)
Dept: PHYSICAL THERAPY | Facility: CLINIC | Age: 76
DRG: 054 | End: 2021-06-03
Payer: MEDICARE

## 2021-06-03 LAB
LABORATORY COMMENT REPORT: NORMAL
POTASSIUM SERPL-SCNC: 3.4 MMOL/L (ref 3.4–5.3)
POTASSIUM SERPL-SCNC: 4 MMOL/L (ref 3.4–5.3)
SARS-COV-2 RNA RESP QL NAA+PROBE: NEGATIVE
SPECIMEN SOURCE: NORMAL

## 2021-06-03 PROCEDURE — 97110 THERAPEUTIC EXERCISES: CPT | Mod: GO

## 2021-06-03 PROCEDURE — 84132 ASSAY OF SERUM POTASSIUM: CPT | Performed by: INTERNAL MEDICINE

## 2021-06-03 PROCEDURE — 250N000012 HC RX MED GY IP 250 OP 636 PS 637: Performed by: STUDENT IN AN ORGANIZED HEALTH CARE EDUCATION/TRAINING PROGRAM

## 2021-06-03 PROCEDURE — 99232 SBSQ HOSP IP/OBS MODERATE 35: CPT | Mod: GC | Performed by: INTERNAL MEDICINE

## 2021-06-03 PROCEDURE — 97530 THERAPEUTIC ACTIVITIES: CPT | Mod: GP

## 2021-06-03 PROCEDURE — 97116 GAIT TRAINING THERAPY: CPT | Mod: GP

## 2021-06-03 PROCEDURE — 250N000013 HC RX MED GY IP 250 OP 250 PS 637: Performed by: INTERNAL MEDICINE

## 2021-06-03 PROCEDURE — 120N000002 HC R&B MED SURG/OB UMMC

## 2021-06-03 PROCEDURE — U0005 INFEC AGEN DETEC AMPLI PROBE: HCPCS | Performed by: STUDENT IN AN ORGANIZED HEALTH CARE EDUCATION/TRAINING PROGRAM

## 2021-06-03 PROCEDURE — 250N000013 HC RX MED GY IP 250 OP 250 PS 637: Performed by: STUDENT IN AN ORGANIZED HEALTH CARE EDUCATION/TRAINING PROGRAM

## 2021-06-03 PROCEDURE — U0003 INFECTIOUS AGENT DETECTION BY NUCLEIC ACID (DNA OR RNA); SEVERE ACUTE RESPIRATORY SYNDROME CORONAVIRUS 2 (SARS-COV-2) (CORONAVIRUS DISEASE [COVID-19]), AMPLIFIED PROBE TECHNIQUE, MAKING USE OF HIGH THROUGHPUT TECHNOLOGIES AS DESCRIBED BY CMS-2020-01-R: HCPCS | Performed by: STUDENT IN AN ORGANIZED HEALTH CARE EDUCATION/TRAINING PROGRAM

## 2021-06-03 PROCEDURE — 36415 COLL VENOUS BLD VENIPUNCTURE: CPT | Performed by: INTERNAL MEDICINE

## 2021-06-03 PROCEDURE — 250N000011 HC RX IP 250 OP 636: Performed by: STUDENT IN AN ORGANIZED HEALTH CARE EDUCATION/TRAINING PROGRAM

## 2021-06-03 PROCEDURE — 999N000157 HC STATISTIC RCP TIME EA 10 MIN

## 2021-06-03 PROCEDURE — 97530 THERAPEUTIC ACTIVITIES: CPT | Mod: GO

## 2021-06-03 RX ORDER — POTASSIUM CHLORIDE 750 MG/1
40 TABLET, EXTENDED RELEASE ORAL ONCE
Status: COMPLETED | OUTPATIENT
Start: 2021-06-03 | End: 2021-06-03

## 2021-06-03 RX ADMIN — DOXEPIN HYDROCHLORIDE 3 MG: 10 SOLUTION ORAL at 22:11

## 2021-06-03 RX ADMIN — AMLODIPINE BESYLATE 5 MG: 5 TABLET ORAL at 08:08

## 2021-06-03 RX ADMIN — LEVETIRACETAM 1250 MG: 750 TABLET, FILM COATED ORAL at 08:09

## 2021-06-03 RX ADMIN — QUETIAPINE 50 MG: 50 TABLET ORAL at 22:10

## 2021-06-03 RX ADMIN — FUROSEMIDE 40 MG: 20 TABLET ORAL at 08:08

## 2021-06-03 RX ADMIN — DEXAMETHASONE 2 MG: 2 TABLET ORAL at 08:09

## 2021-06-03 RX ADMIN — ENOXAPARIN SODIUM 40 MG: 40 INJECTION SUBCUTANEOUS at 20:55

## 2021-06-03 RX ADMIN — PANTOPRAZOLE SODIUM 40 MG: 40 TABLET, DELAYED RELEASE ORAL at 20:56

## 2021-06-03 RX ADMIN — LEVETIRACETAM 1250 MG: 750 TABLET, FILM COATED ORAL at 20:55

## 2021-06-03 RX ADMIN — LINACLOTIDE 290 MCG: 145 CAPSULE, GELATIN COATED ORAL at 08:10

## 2021-06-03 RX ADMIN — BUSPIRONE HYDROCHLORIDE 30 MG: 10 TABLET ORAL at 20:55

## 2021-06-03 RX ADMIN — QUETIAPINE FUMARATE 25 MG: 25 TABLET ORAL at 08:08

## 2021-06-03 RX ADMIN — CALCIUM POLYCARBOPHIL 625 MG: 625 TABLET, FILM COATED ORAL at 08:08

## 2021-06-03 RX ADMIN — POTASSIUM CHLORIDE 40 MEQ: 750 TABLET, EXTENDED RELEASE ORAL at 11:23

## 2021-06-03 RX ADMIN — QUETIAPINE FUMARATE 25 MG: 25 TABLET ORAL at 20:55

## 2021-06-03 RX ADMIN — PANTOPRAZOLE SODIUM 40 MG: 40 TABLET, DELAYED RELEASE ORAL at 08:09

## 2021-06-03 RX ADMIN — FLUOXETINE HYDROCHLORIDE 60 MG: 40 CAPSULE ORAL at 08:09

## 2021-06-03 RX ADMIN — BUSPIRONE HYDROCHLORIDE 30 MG: 10 TABLET ORAL at 08:09

## 2021-06-03 RX ADMIN — SULFAMETHOXAZOLE AND TRIMETHOPRIM 1 TABLET: 400; 80 TABLET ORAL at 08:08

## 2021-06-03 ASSESSMENT — ACTIVITIES OF DAILY LIVING (ADL)
ADLS_ACUITY_SCORE: 23

## 2021-06-03 ASSESSMENT — MIFFLIN-ST. JEOR: SCORE: 1144.98

## 2021-06-03 NOTE — PLAN OF CARE
[id number] Alert, only disoriented to time. Otherwise neuros intact. Bed alarm on for safety. Denies pain and nausea. Voids spontaneously. Intermittently incontinent of bowel and bladder. BM x1. PIV saline locked. Not out of bed this shift. Up with assist x2, gait belt and walker per report. Pt able to reposition self in bed independently. VSS on room air. Discharge pending TCU placement.

## 2021-06-03 NOTE — PROGRESS NOTES
[facility]    Progress Note - Maximilian 3 Service        Date of Admission:  5/26/2021    Assessment & Plan       Olga Bailey is a 75 year old female who has a history of Glioblastoma s/p resection in 03/2021, anxiety, depression, and HTN and is admitted after having a seizure at the TCU.     Today:  - Currently medically stable for discharge awaiting TCU placement    Seizure 2/2 GBM  Previous seizure on 5/11 during hospitalization. Has been on low dose Levetiracetam up to 750 mg BD at discharge. On arrival, patient was in post-ictal state. Neurology consulted, suspect seizure secondary to GBM. Previous EEG imaging demonstrated seizure foci at GBM lesion. Levetiracetam level elevated at 49 on arrival, day of seizure. No additional seizures while inpatient.  - Neurology consulted, signed off at this time  - Levetiracetam 1250 mg BID  - dexamethasone 2 mg bid x 7days, followed by 2 mg qday for 7 days  - omeprazole 20 mg qday for duration of dexamethasone treatment  - If another seizure occurs, give 300 mg Vimpat IV load and 100 mg Vimpat bid     Glioblastoma Multiforme  Left frontoparietal brain glioblastoma status post resection 2/23/2021. Seen by radiation oncology 3/24 recommended XRT and chemo radiation. However subsequently admitted for resp failure. Hem/onc was initially concerned that she might not be a candidate for chemotherapy or radiation currently due to poor performance status. In June, will assess for whether or not to initiate temozolomide at adjuvant-dosing  - 1 month follow up with Dr. Baker  - Completed 15 sessions of radiation on 5/27     Recent history of Presumed PJP Pneumonia  Continue supplemental O2 as needed, due to steroids will continue home dose of bactrim.  - PTA bactrim     Urinary Retention  Uncomplicated UTI  Patient without dysuria and presented with right flank pain. UA in ED with leuk esterase and WBC elevated. Afebrile and CBC without  leukocytosis. Awaiting culture results.Continues to have intermittent urinary retention with need for catheterization, if this continues will need mccray placement.  - Completed UTI treatment course with PO Cefidinir     Major depressive disorder, recurrent  Anxiety  Follows with psychiatry and psychology as outpatint.  Saw health psychology during admission for GBM. Seeing health psychology while at TCU.   - Seroquel 25 mg BID + 50 mg at bedtime  - Ativan 0.5 mg q4h PRN  - Scheduled doxepin PRN at night for sleep  - PTA Buspar  - PTA prozac 60 mg     Bilateral lower extremity DVT  Right retroperitoneal hematoma   On 3/29 at outside hospital patient's O2 needs increased, duplex ultrasound revealed bilateral lower extremity DVT. CT PE negative for embolism.  Treated with IV heparin and transitioned to Lovenox 70 mg. On 4/14 this was complicated by retroperitoneal bleed, requiring 4 units of packed red blood cells.  Lovenox was held, and IVC filter was placed on 4/16. Vascular surgery was involved, and a CT abdomen was stable bleed so no surgical intervention was required.  Eventually resumed on prophylactic 40mg of Lovenox daily.  - Continue 40 mg Lovenox qday     Constipation  - Continue prior Bowel regimen: linaclotide, mesalamine, MiraLAX, simethicone PRN     Non-severe malnutrition  Previous admission required TPN and nutrition consulted.  - Nutrition consulted.    Hypernatremia (Resolved)  Suspect secondary to decreased oral intake. Encouraging oral intake at this time and monitoring.  - Encourage PO intake     Hypokalemia (Resolved)  Potassium of 3.2 on 5/27, resolved on 5/28. Continuing to monitor    Hypertension  Started on metoprolol at OSH for sinus tachycardia.  - PTA amlodipine 10 mg qday     Chronic medical problems:  - albuterol PRN  - GERD: back to PTA PO PPI     Diet: Combination Diet Dysphagia Diet Level 2: Mechan Altered; Nectar Thickened Liquids (pre-thickened or use instant food  "thickener)  Snacks/Supplements Adult: Magic Cup; With Meals  Room Service    Fluids: None  Lines: PIV  DVT Prophylaxis: Enoxaparin (Lovenox) SQ  Martino Catheter: not present  Code Status: Full Code         Disposition Plan   Expected discharge: 2 - 3 days, recommended to transitional care unit once placement found.     The patient's care was discussed with the Attending Physician, Dr. Bates.    Zane Max MD  [address]  Please see sign in/sign out for up to date coverage information  ______________________________________________________________________    Interval History   Nursing notes and vital signs reviewed. Patient reports she has been doing well, just frustrated that her stay here has been so long and the TCUs won't accept her. She says \"I am a good person, I don't know why they don't want me.\" Writer empathized with patient about the long wait. Otherwise, denied pain or SOB. Her left foot is mildly swollen, and per patient, looks much better than before. We discussed elevated the leg for likely venous insufficiency.     Data reviewed today: I reviewed all medications, new labs and imaging results over the last 24 hours. I personally reviewed no images or EKG's today.    Physical Exam   Vital Signs: Temp: 97.3  F (36.3  C) Temp src: Temporal BP: 117/73 Pulse: 71   Resp: 18 SpO2: 93 % O2 Device: None (Room air)    Weight: 150 lbs 1.6 oz     Constitutional: awake, alert, cooperative, no apparent distress in bed watching movie   Eyes: pupils equal and round, extra ocular muscles intact, sclera clear, conjunctiva normal  Respiratory: No increased work of breathing, good air exchange, clear to auscultation bilaterally, no wheezing.  Cardiovascular: Regular rate and rhythm, and no murmur noted  GI: soft, non-distended, non-tender  Skin: no bruising or bleeding and normal skin color, texture, turgor  Musculoskeletal: L foot swelling " without extension to ankle, no calf pain, full ROM, no tenderness nor erythema  Neurologic: Awake, alert, oriented to name, place and time.     Data   Recent Labs   Lab 06/03/21  0711 06/01/21  0728 05/31/21  0634 05/29/21  0706 05/29/21  0706 05/28/21  0705   WBC  --   --  8.0  --   --  6.0   HGB  --   --  10.1*  --   --  9.1*   MCV  --   --  96  --   --  97   PLT  --  258 262  --  204 179   NA  --   --  141  --  145* 145*   POTASSIUM 3.4 3.6 3.8   < > 3.3* 3.6   CHLORIDE  --   --  106  --  112* 115*   CO2  --   --  28  --  26 24   BUN  --   --  12  --  8 8   CR  --   --  0.50*  --  0.43*  0.46* 0.40*   ANIONGAP  --   --  7  --  7 6   ESTIVEN  --   --  9.0  --  8.9 9.0   GLC  --   --  106*  --  118* 130*    < > = values in this interval not displayed.     No results found for this or any previous visit (from the past 24 hour(s)).

## 2021-06-03 NOTE — PROGRESS NOTES
Care Management Follow Up    Length of Stay (days): 8  Expected Discharge Date: 06/04/21 (When placement is found)     Concerns to be Addressed: Discharge planning  Patient plan of care discussed at interdisciplinary rounds: Yes    Anticipated Discharge Disposition: Transitional Care    Anticipated Discharge Services: TBD  Anticipated Discharge DME: TBD    Patient/family educated on Medicare website which has current facility and service quality ratings: yes  Education Provided on the Discharge Plan: yes   Patient/Family in Agreement with the Plan: yes    Referrals Placed by CM/SW:    PENDING:  [facility] 4th floor  [address] [place]  [address]  P: [phone]  F: [phone]  -SW spoke with Usha and initiated a referral.  They do not have any beds available today, but may have availability tomorrow.    [facility] (formerly [facility])  [address] [id number] [phone];  Admissions:  [phone];  F:  [phone]  -E-referral sent.    [facility]  [address] [place], [place] [id number] [phone]  -E-referral sent.    DECLINED/ DISCONTINUED:  [facility]  [address] [place], [place] [address] [phone]  -Declined due to potential need for chemo    [facility]  [address] [place], [place] [address] [phone]  -Declined due to potential need for chemo    [facility]  [address] [place], [place] [id number] [phone]  - SW spoke with admissions, they are declined due to complexity    [facility]  [address] [place], [place] [address] [phone]  - SW spoke with admissions, they have declined as they do not have bed availability until sometime next week    [facility]  [address] [place], [place] [id number] [phone]  -E-referral sent.  SW spoke with admissions, they have declined as they have no bed  availability until sometime next week.      [facility]  [address] [place] [id number] [phone]  -E-referral sent.  They have declined d/t chemo and not having a private room for her (requried for non-vaccinated patients).    Private pay costs discussed: Not applicable    Additional Information:  Pt is medically ready for discharge when placement is found.  She is not on oral chemo, may need at the end of the month which is a potential barrier to placement.     CAMRON spoke with La Nena (Daughter) and reviewed the above denials.  She was agreeable to additional TCU referrals, preferences identified and referrals sent.    MACARIO Bee, [facility]  Phone:  [phone]   Pager:  [phone]

## 2021-06-03 NOTE — PLAN OF CARE
VSS.  A+O except time, forgetful.   RUE drift and BUE with tremors(baseline) otherwise neuros are intact.  No seizures noted this shift, seizure pads in place.  LS clear, dim at the bases.  +BS, 1BM yet this shift. Multiple urine incontinence.  On DD2/nectar thick liquids, good appetite.  Up with 2/GB/walker when walking, has OT/PT. Assist of 1 with pivots.Turns well to sides.  Denies pain.  Gen skin with bruising, redness in brad rectal area-barrier cream applied.  PIV sl'd.   Potassium replaced PO, recheck at 4.0.  Continue to monitor gen status and continue with POC. Planned to be dc'd to TCU when available.

## 2021-06-03 NOTE — PLAN OF CARE
"/81 (BP Location: Right arm)   Pulse 74   Temp 97.5  F (36.4  C) (Temporal)   Resp 16   Ht 1.6 m (5' 3\")   Wt 68.6 kg (151 lb 4.8 oz)   SpO2 93%   BMI 26.80 kg/m    Neuro: A&Ox3. Arouses to voice. No change in neuro status. No noted seizure activity.  Cardiac: Afebrile, VSS.   Respiratory: RA   GI/: intermittently incontinent of B/B. No BM this shift.   Diet/appetite: Tolerating DD#, nectar thick diet. Denies nausea   Activity: Up with assist of 2/gb and walker. Turns with assist of 1.  Pain: . Denies   Skin: No new deficits noted.  Lines: piv sl'd  Replacement: awaiting am lab results.    Rested btwn cares. Bed alarm on for safety. Using call light intermittently. Awaiting TCU placement. Continue to monitor.    "

## 2021-06-04 ENCOUNTER — APPOINTMENT (OUTPATIENT)
Dept: OCCUPATIONAL THERAPY | Facility: CLINIC | Age: 76
DRG: 054 | End: 2021-06-04
Payer: MEDICARE

## 2021-06-04 ENCOUNTER — APPOINTMENT (OUTPATIENT)
Dept: PHYSICAL THERAPY | Facility: CLINIC | Age: 76
DRG: 054 | End: 2021-06-04
Payer: MEDICARE

## 2021-06-04 LAB
CREAT SERPL-MCNC: 0.57 MG/DL (ref 0.52–1.04)
GFR SERPL CREATININE-BSD FRML MDRD: 90 ML/MIN/{1.73_M2}
PLATELET # BLD AUTO: 243 10E9/L (ref 150–450)

## 2021-06-04 PROCEDURE — 97116 GAIT TRAINING THERAPY: CPT | Mod: GP

## 2021-06-04 PROCEDURE — 85049 AUTOMATED PLATELET COUNT: CPT | Performed by: STUDENT IN AN ORGANIZED HEALTH CARE EDUCATION/TRAINING PROGRAM

## 2021-06-04 PROCEDURE — 82565 ASSAY OF CREATININE: CPT | Performed by: STUDENT IN AN ORGANIZED HEALTH CARE EDUCATION/TRAINING PROGRAM

## 2021-06-04 PROCEDURE — 250N000011 HC RX IP 250 OP 636: Performed by: STUDENT IN AN ORGANIZED HEALTH CARE EDUCATION/TRAINING PROGRAM

## 2021-06-04 PROCEDURE — 250N000013 HC RX MED GY IP 250 OP 250 PS 637: Performed by: STUDENT IN AN ORGANIZED HEALTH CARE EDUCATION/TRAINING PROGRAM

## 2021-06-04 PROCEDURE — 36415 COLL VENOUS BLD VENIPUNCTURE: CPT | Performed by: STUDENT IN AN ORGANIZED HEALTH CARE EDUCATION/TRAINING PROGRAM

## 2021-06-04 PROCEDURE — 97530 THERAPEUTIC ACTIVITIES: CPT | Mod: GP

## 2021-06-04 PROCEDURE — 97530 THERAPEUTIC ACTIVITIES: CPT | Mod: GO

## 2021-06-04 PROCEDURE — 99232 SBSQ HOSP IP/OBS MODERATE 35: CPT | Mod: GC | Performed by: INTERNAL MEDICINE

## 2021-06-04 PROCEDURE — 250N000012 HC RX MED GY IP 250 OP 636 PS 637: Performed by: STUDENT IN AN ORGANIZED HEALTH CARE EDUCATION/TRAINING PROGRAM

## 2021-06-04 PROCEDURE — 97535 SELF CARE MNGMENT TRAINING: CPT | Mod: GO

## 2021-06-04 PROCEDURE — 120N000002 HC R&B MED SURG/OB UMMC

## 2021-06-04 RX ORDER — LEVETIRACETAM 250 MG/1
1250 TABLET ORAL 2 TIMES DAILY
Qty: 60 TABLET | Refills: 0 | DISCHARGE
Start: 2021-06-04 | End: 2021-08-12

## 2021-06-04 RX ORDER — DEXAMETHASONE 2 MG/1
2 TABLET ORAL DAILY
Qty: 5 TABLET | Refills: 0 | Status: ON HOLD | DISCHARGE
Start: 2021-06-05 | End: 2021-06-22

## 2021-06-04 RX ORDER — DEXAMETHASONE 2 MG/1
2 TABLET ORAL DAILY
Qty: 30 TABLET | Refills: 0 | DISCHARGE
Start: 2021-06-05 | End: 2021-06-04

## 2021-06-04 RX ADMIN — DEXAMETHASONE 2 MG: 2 TABLET ORAL at 08:21

## 2021-06-04 RX ADMIN — FUROSEMIDE 40 MG: 20 TABLET ORAL at 08:21

## 2021-06-04 RX ADMIN — CALCIUM POLYCARBOPHIL 625 MG: 625 TABLET, FILM COATED ORAL at 08:20

## 2021-06-04 RX ADMIN — PANTOPRAZOLE SODIUM 40 MG: 40 TABLET, DELAYED RELEASE ORAL at 17:21

## 2021-06-04 RX ADMIN — QUETIAPINE FUMARATE 25 MG: 25 TABLET ORAL at 21:41

## 2021-06-04 RX ADMIN — AMLODIPINE BESYLATE 5 MG: 5 TABLET ORAL at 08:21

## 2021-06-04 RX ADMIN — DOXEPIN HYDROCHLORIDE 3 MG: 10 SOLUTION ORAL at 21:39

## 2021-06-04 RX ADMIN — LEVETIRACETAM 1250 MG: 750 TABLET, FILM COATED ORAL at 08:20

## 2021-06-04 RX ADMIN — QUETIAPINE FUMARATE 25 MG: 25 TABLET ORAL at 08:21

## 2021-06-04 RX ADMIN — SULFAMETHOXAZOLE AND TRIMETHOPRIM 1 TABLET: 400; 80 TABLET ORAL at 08:21

## 2021-06-04 RX ADMIN — BUSPIRONE HYDROCHLORIDE 30 MG: 10 TABLET ORAL at 21:40

## 2021-06-04 RX ADMIN — LEVETIRACETAM 1250 MG: 750 TABLET, FILM COATED ORAL at 21:41

## 2021-06-04 RX ADMIN — ENOXAPARIN SODIUM 40 MG: 40 INJECTION SUBCUTANEOUS at 21:39

## 2021-06-04 RX ADMIN — BUSPIRONE HYDROCHLORIDE 30 MG: 10 TABLET ORAL at 08:21

## 2021-06-04 RX ADMIN — LINACLOTIDE 290 MCG: 145 CAPSULE, GELATIN COATED ORAL at 06:52

## 2021-06-04 RX ADMIN — FLUOXETINE HYDROCHLORIDE 60 MG: 40 CAPSULE ORAL at 08:20

## 2021-06-04 RX ADMIN — PANTOPRAZOLE SODIUM 40 MG: 40 TABLET, DELAYED RELEASE ORAL at 06:52

## 2021-06-04 RX ADMIN — QUETIAPINE 50 MG: 50 TABLET ORAL at 21:40

## 2021-06-04 ASSESSMENT — ACTIVITIES OF DAILY LIVING (ADL)
ADLS_ACUITY_SCORE: 23

## 2021-06-04 NOTE — DISCHARGE SUMMARY
[facility]  Discharge Summary - Medicine & Pediatrics       Date of Admission:  5/26/2021  Date of Discharge:  6/4/2021  Discharging Provider: Dr. 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  No follow up labs or test are   needed.  Follow up with primary care provider in 2-3 weeks.  No follow up labs or   test are needed.  Follow up with specialist, oncologist, in 1-2 weeks.  No follow up labs or   test are needed.             Unresulted Labs Ordered in the Past 30 Days of this Admission     No orders found from 4/26/2021 to 5/27/2021.          Discharge Disposition   Discharged to long-term care facility  Condition at discharge: Stable  Patient ready to discharge to a skilled nursing facility as soon as possible in order to create capacity for patients related to the COVID-19 pandemic.    Hospital Course   Olga Bailey was admitted on 5/26/2021 for breakthrough seizures secondary to GBM.  The following problems were addressed during her hospitalization:    Seizure 2/2 GBM  Previous seizure on 5/11 during hospitalization. Has been on low dose Levetiracetam up to 750 mg BD at discharge. On arrival, patient was in post-ictal state. Neurology consulted, suspect seizure secondary to GBM. Previous EEG imaging demonstrated seizure foci at GBM lesion. Levetiracetam level elevated at 49 on arrival, day of seizure. No additional seizures while inpatient after levetiracetam increased to 1250mg BID and dexamethasone 2mg BID X7 days followed by 2mg qday was initiated.     Glioblastoma Multiforme  Left frontoparietal brain glioblastoma status post resection  2/23/2021. Seen by radiation oncology 3/24 recommended XRT and chemo radiation. However subsequently admitted for resp failure. Hem/onc was initially concerned that she might not be a candidate for chemotherapy or radiation currently due to poor performance status. In June, will assess for whether or not to initiate temozolomide at adjuvant-dosing  - 1 month follow up with Dr. Baker  - Completed 15 sessions of radiation on 5/27     Recent history of Presumed PJP Pneumonia  - ctn PTA bactrim     Urinary Retention  Uncomplicated UTI  Patient without dysuria and presented with right flank pain. UA in ED with leuk esterase and WBC elevated. Afebrile and CBC without leukocytosis. Awaiting culture results.Continues to have intermittent urinary retention with need for catheterization, if this continues will need mccray placement.  - Completed UTI treatment course with PO Cefidinir     Major depressive disorder, recurrent  Anxiety  Follows with psychiatry and psychology as outpatint.  Saw health psychology during admission for GBM. Seeing health psychology while at TCU.   - Seroquel 25 mg BID + 50 mg at bedtime  - Ativan 0.5 mg q4h PRN  - Scheduled doxepin PRN at night for sleep  - PTA Buspar  - PTA prozac 60 mg     Bilateral lower extremity DVT  Right retroperitoneal hematoma   On 3/29 at outside hospital patient's O2 needs increased, duplex ultrasound revealed bilateral lower extremity DVT. CT PE negative for embolism.  Treated with IV heparin and transitioned to Lovenox 70 mg. On 4/14 this was complicated by retroperitoneal bleed, requiring 4 units of packed red blood cells.  Lovenox was held, and IVC filter was placed on 4/16. Vascular surgery was involved, and a CT abdomen was stable bleed so no surgical intervention was required.  Eventually resumed on prophylactic 40mg of Lovenox daily.     Constipation  - Continue prior Bowel regimen: linaclotide, mesalamine, MiraLAX, simethicone PRN     Hypertension  Started on  metoprolol at OSH for sinus tachycardia.  - PTA amlodipine 10 mg qday     Chronic medical problems:  - albuterol PRN  - GERD: back to PTA PO PPI    Consultations This Hospital Stay   PHYSICAL THERAPY ADULT IP CONSULT  OCCUPATIONAL THERAPY ADULT IP CONSULT  NUTRITION SERVICES ADULT IP CONSULT  SPEECH LANGUAGE PATH ADULT IP CONSULT  VASCULAR ACCESS CARE ADULT IP CONSULT  VASCULAR ACCESS CARE ADULT IP CONSULT  PHYSICAL THERAPY ADULT IP CONSULT  OCCUPATIONAL THERAPY ADULT IP CONSULT    Code Status   Full Code       The patient was discussed with Dr. Paulo Kelsey MD  [facility] 3 Service  [facility] [address] [address]  Phone: [phone]  ______________________________________________________________________    Physical Exam   Vital Signs: Temp: 96.9  F (36.1  C) Temp src: Temporal BP: 122/78 Pulse: 74   Resp: 18 SpO2: 96 % O2 Device: None (Room air)    Weight: 150 lbs 1.6 oz  Constitutional: awake, alert, cooperative, no apparent distress, and appears stated age  Eyes: Lids and lashes normal, pupils equal, round and reactive to light, extra ocular muscles intact, sclera clear, conjunctiva normal  ENT: Normocephalic, without obvious abnormality, atraumatic, external ears without lesions  Respiratory: No increased work of breathing, good air exchange, clear to auscultation bilaterally, no crackles or wheezing  Cardiovascular: Normal apical impulse, regular rate and rhythm, normal S1 and S2, no S3 or S4, and no murmur noted  GI: normal bowel sounds, soft, non-distended, non-tender, no masses palpated, no hepatosplenomegally  Skin: no bruising or bleeding and normal skin color, texture, turgor  Musculoskeletal: improving L foot swelling without extension to ankle, no calf pain, full ROM  Neurologic: Awake, alert, oriented to name, place and time.  Cranial nerves II-XII are grossly intact.  Right pronator drift at baseline. Right motor 4 out of 5, left motor 5 out of  5.     Primary Care Physician   [facility]    Discharge Orders      General info for SNF    Length of Stay Estimate: Short Term Care: Estimated # of Days 31-90  Condition at Discharge: Stable  Level of care:skilled   Rehabilitation Potential: Fair  Admission H&P remains valid and up-to-date: Yes  Recent Chemotherapy: Date:  04/2021                  Use Nursing Home Standing Orders: Yes     Mantoux instructions    Give two-step Mantoux (PPD) Per Facility Policy Yes     Reason for your hospital stay    You were in the hospital due to breakthrough seizures causing altered mental status     Follow Up and recommended labs and tests    Follow up with Nursing home physician.  No follow up labs or test are needed.  Follow up with primary care provider in 2-3 weeks.  No follow up labs or test are needed.  Follow up with specialist, oncologist, in 1-2 weeks.  No follow up labs or test are needed.     Activity - Up with assistive device     Full Code     Physical Therapy Adult Consult    Evaluate and treat as clinically indicated.    Reason:  Deconditioning from hospitalization and tremors/weakness due to GBM     Occupational Therapy Adult Consult    Evaluate and treat as clinically indicated.    Reason:   Deconditioning from hospitalization and tremors/weakness due to GBM     Fall precautions     Seizure precautions     Advance Diet as Tolerated    Follow this diet upon discharge: Orders Placed This Encounter      Calorie Counts      Snacks/Supplements Adult: Magic Cup; With Meals      Room Service      Combination Diet Dysphagia Diet Level 2: Mechan Altered; Nectar Thickened Liquids (pre-thickened or use instant food thickener)       Significant Results and Procedures   Most Recent 3 CBC's:  Recent Labs   Lab Test 06/04/21  0824 06/01/21  0728 05/31/21  0634 05/28/21  0705 05/28/21  0705 05/27/21  0627   WBC  --   --  8.0  --  6.0 8.7   HGB  --   --  10.1*  --  9.1* 8.5*   MCV  --   --  96  --  97 96    258  262   < > 179 154    < > = values in this interval not displayed.     Most Recent 3 BMP's:  Recent Labs   Lab Test 06/04/21  0824 06/03/21  1558 06/03/21  0711 06/01/21  0728 05/31/21  0634 05/29/21  0706 05/29/21  0706 05/28/21  0705   NA  --   --   --   --  141  --  145* 145*   POTASSIUM  --  4.0 3.4 3.6 3.8   < > 3.3* 3.6   CHLORIDE  --   --   --   --  106  --  112* 115*   CO2  --   --   --   --  28  --  26 24   BUN  --   --   --   --  12  --  8 8   CR 0.57  --   --   --  0.50*  --  0.43*  0.46* 0.40*   ANIONGAP  --   --   --   --  7  --  7 6   ESTIVEN  --   --   --   --  9.0  --  8.9 9.0   GLC  --   --   --   --  106*  --  118* 130*    < > = values in this interval not displayed.   ,   Results for orders placed or performed during the hospital encounter of 05/26/21   CT Chest Pulmonary Embolism w Contrast    Narrative    Examination:  CT CHEST PULMONARY EMBOLISM W CONTRAST 5/26/2021 1:20 PM      Comparison: CT PE 5-21, CT AP 4/23/2021.    History: PE suspected, high prob    TECHNIQUE: Volumetric helical acquisition of CT images of the chest  from the lung apices to the kidneys were acquired in arterial phase  after the administration of IV contrast. Three-dimensional (3D)  post-processed angiographic images were reconstructed, archived to  PACS and used in interpretation of this study.   Contrast dose: iopamidol (ISOVUE-370) solution 56 mL       Total DLP: 154 mGy*cm    FINDINGS:    Chest:    There is adequate opacification of the main and lobar pulmonary  arteries. No filling defect in the lobar and main segmental pulmonary  arteries to suggest pulmonary embolism. Motion artifact limits  evaluation of the subsegmental pulmonary arteries. There is no  right-sided heart strain. Normal caliber main pulmonary artery. No  contrast refluxing into the hepatic vasculature.    Central tracheobronchial tree is patent. Basilar predominant  interlobular septal thickening/reticular opacities with superimposed  ground glass  opacities. Extent of groundglass opacities has  significantly improved since 5/2/2021, particularly in the upper  lobes. Trace right pleural effusion. No pneumothorax. Chronic  atelectasis in the anterior upper lobes. Posterior right upper  tracheal diverticulum.    Heart size is within normal limits. There is no pericardial effusion.   Normal thoracic vasculature. No significant mediastinal, hilum or  axillary lymphadenopathy.     Bones and soft tissues: No suspicious bone findings. Cervical fusion  hardware.    Upper abdomen: Partially visualized large right retroperitoneal fluid  collection/hemorrhage. Gallbladder is dilated.      Impression    IMPRESSION:   1. No pulmonary embolism. No evidence of right heart strain.  2. Extensive interlobular septal thickening and reticulation with  superimposed groundglass opacities, which have significantly improved  since 5/2/2021, likely resolving infectious/inflammatory process with  possible superimposed pulmonary edema.  3. Trace right pleural effusion.    I have personally reviewed the examination and initial interpretation  and I agree with the findings.    LENY ESPINOZA, DO   CT Abdomen Pelvis w/o Contrast    Narrative    CT of the Abdomen and Pelvis without contrast, 5/26/2021 4:02 PM.    Comparison: CT abdomen and pelvis 4/23/2021.    History: Flank pain, kidney stone suspected.     Technique: Axial images of the  abdomen and pelvis were obtained  without contrast. Coronal reconstructions were provided. Images were  reviewed in bone, lung, and soft tissue windows.    Findings:    Lung bases:  Heart size is normal. No pericardial effusion. Distal esophagus is  normal. Bibasilar, left greater than right interstitial thickening and  intralobular groundglass opacities. Small right greater than left  pleural effusions.    Abdomen and Pelvis:   Liver is unremarkable. Subcentimeter hypodensity in hepatic segment 4B  along the gallbladder fossa is too small to characterize  but favored  to represent a cyst (series 3, image 147). Prominent right hepatic  lobe intrahepatic biliary ducts. Gallbladder is distended. No wall  thickening, pericholecystic fluid, or calcified stone. Spleen size is  within normal limits. Fatty atrophy of the pancreas. Likely  interdigitation of fat along the pancreatic body (series 3, image  141). The main pancreatic duct is not dilated.    No adrenal mass. Symmetric renal enhancement. No hydronephrosis.  Scattered bilateral subcentimeter cortical hypodensities are too small  to characterize by CT. Contrast within the renal collecting system  limits evaluation for stones. Bladder is distended with intraluminal  contrast.    Stomach is decompressed. No small or large bowel dilation.. The  appendix is normal. No free intraperitoneal air. Diverticulosis  without additional evidence to suggest acute diverticulitis. Stable  size with interval decreased attenuation of the right retroperitoneal  hematoma measuring 8.0 x 12.3 cm (series 3, image 214) with mild  peripheral enhancement. No new intra-abdominal fluid collection.    Abdominal aorta and major branches are normal in caliber with moderate  predominantly aortoiliac atherosclerotic calcifications. IVC filter in  appropriate position with tip terminating below the level of the renal  veins. The IVC is decompressed with pancake appearance, unchanged.    No mesenteric, retroperitoneal, or pelvic lymphadenopathy by size  criteria.    Bones and Soft Tissues:   No suspicious osseous lesion. Advanced degenerative changes of the  thoracolumbar spine. Grade 1 anterolisthesis of L3 and L4. No  suspicious mass. Small fat-containing umbilical hernia.      Impression    Impression:     1. Limited evaluation for stones due to residual contrast in the renal  collecting system although no additional secondary findings to suggest  obstructing stone including lack of hydronephrosis, hydroureter, or  periureteral stranding.  2.  Evolving right retroperitoneal hematoma with liquefaction which is  not significantly changed in size from prior exam. No new  intra-abdominal fluid collection.  3. Diverticulosis without additional evidence to suggest acute  diverticulitis.    I have personally reviewed the examination and initial interpretation  and I agree with the findings.    ESTEPHANIA CABEZAS MD       Discharge Medications   Current Discharge Medication List      START taking these medications    Details   dexamethasone (DECADRON) 2 MG tablet Take 1 tablet (2 mg) by mouth daily  Qty: 30 tablet, Refills: 0    Associated Diagnoses: Recurrent seizures (H); GBM (glioblastoma multiforme) (H)      !! melatonin 1 MG TABS tablet Take 1 tablet (1 mg) by mouth nightly as needed for sleep  Qty: 30 tablet, Refills: 0    Associated Diagnoses: Adjustment disorder with depressed mood; Anxiety       !! - Potential duplicate medications found. Please discuss with provider.      CONTINUE these medications which have CHANGED    Details   levETIRAcetam (KEPPRA) 250 MG tablet Take 5 tablets (1,250 mg) by mouth 2 times daily  Qty: 60 tablet, Refills: 0    Associated Diagnoses: Recurrent seizures (H); GBM (glioblastoma multiforme) (H)         CONTINUE these medications which have NOT CHANGED    Details   acetaminophen (TYLENOL) 325 MG tablet Take 650 mg by mouth every 4 hours as needed for mild pain       albuterol (PROAIR HFA/PROVENTIL HFA/VENTOLIN HFA) 108 (90 Base) MCG/ACT inhaler Inhale 2 puffs into the lungs every 4 hours as needed for wheezing  Qty:      Comments: Pharmacy may dispense brand covered by insurance (Proair, or proventil or ventolin or generic albuterol inhaler)  Associated Diagnoses: 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, unspecified part of lung (H)      albuterol (PROVENTIL) (2.5 MG/3ML) 0.083% neb solution Take 1 vial (2.5 mg) by nebulization every 4 hours as needed for wheezing or shortness of breath / dyspnea  Qty:       Associated Diagnoses: Hypoxia      amLODIPine (NORVASC) 5 MG tablet Take 1 tablet (5 mg) by mouth daily  Qty:      Associated Diagnoses: Benign essential hypertension      bisacodyl (DULCOLAX) 10 MG suppository Place 1 suppository (10 mg) rectally daily as needed for constipation    Associated Diagnoses: Essential hypertension      busPIRone HCl (BUSPAR) 30 MG tablet Take 30 mg by mouth 2 times daily      calcium carbonate (TUMS) 500 MG chewable tablet Take 2 tablets (1,000 mg) by mouth 4 times daily as needed for heartburn    Associated Diagnoses: Gastroesophageal reflux disease with esophagitis without hemorrhage      calcium polycarbophil (FIBERCON) 625 MG tablet Take 1 tablet (625 mg) by mouth daily    Associated Diagnoses: Constipation, unspecified constipation type      carboxymethylcellulose PF (REFRESH PLUS) 0.5 % ophthalmic solution Place 1 drop into both eyes every hour as needed for dry eyes    Associated Diagnoses: Dry eyes      doxepin (SINEQUAN) 10 MG/ML (HIGH CONC) solution Take 0.3 mLs (3 mg) by mouth At Bedtime  Qty: 118 mL, Refills: 0    Associated Diagnoses: Adjustment disorder with depressed mood; Episode of recurrent major depressive disorder, unspecified depression episode severity (H); Anxiety      enoxaparin ANTICOAGULANT (LOVENOX) 40 MG/0.4ML syringe Inject 0.4 mLs (40 mg) Subcutaneous every 24 hours  Qty:      Associated Diagnoses: DVT prophylaxis      FLUoxetine (PROZAC) 20 MG capsule Take 3 capsules (60 mg) by mouth daily  Qty: 30 capsule, Refills: 0    Associated Diagnoses: Adjustment disorder with depressed mood; Episode of recurrent major depressive disorder, unspecified depression episode severity (H)      furosemide (LASIX) 40 MG tablet Take 1 tablet (40 mg) by mouth daily  Qty: 30 tablet, Refills: 0    Associated Diagnoses: Hypoxia; Acute respiratory failure with hypoxia (H)      linaclotide (LINZESS) 290 MCG capsule Take 1 capsule (290 mcg) by mouth every morning (before breakfast)     Associated Diagnoses: Other impaction of intestine (H)      LORazepam (ATIVAN) 0.5 MG tablet Take 1 tablet (0.5 mg) by mouth every 4 hours as needed for anxiety  Qty: 12 tablet, Refills: 0    Associated Diagnoses: Anxiety      !! melatonin 3 MG tablet Take 2 tablets (6 mg) by mouth nightly as needed for sleep  Qty: 30 tablet, Refills: 0    Associated Diagnoses: Anxiety      pantoprazole (PROTONIX) 40 MG EC tablet Take 1 tablet (40 mg) by mouth 2 times daily (before meals)  Qty: 30 tablet, Refills: 0    Associated Diagnoses: Gastroesophageal reflux disease without esophagitis      polyethylene glycol (MIRALAX) 17 GM/Dose powder Take 17 g by mouth daily as needed for constipation  Qty: 510 g, Refills: 0    Associated Diagnoses: Constipation, unspecified constipation type      !! QUEtiapine (SEROQUEL) 25 MG tablet Take 1 tablet (25 mg) by mouth 2 times daily  Qty: 30 tablet, Refills: 0    Associated Diagnoses: Adjustment disorder with depressed mood; Episode of recurrent major depressive disorder, unspecified depression episode severity (H)      !! QUEtiapine (SEROQUEL) 50 MG tablet Take 1 tablet (50 mg) by mouth At Bedtime  Qty: 30 tablet, Refills: 0    Associated Diagnoses: Adjustment disorder with depressed mood; Episode of recurrent major depressive disorder, unspecified depression episode severity (H)      sennosides (SENOKOT) 8.6 MG tablet Take 1 tablet by mouth 2 times daily as needed for constipation  Qty: 30 tablet, Refills: 0    Associated Diagnoses: Constipation, unspecified constipation type      simethicone (MYLICON) 40 MG/0.6ML suspension Take 0.6 mLs (40 mg) by mouth every 6 hours as needed for cramping    Associated Diagnoses: Other impaction of intestine (H)      sulfamethoxazole-trimethoprim (BACTRIM) 400-80 MG tablet Take 1 tablet by mouth daily  Qty: 45 tablet, Refills: 0    Associated Diagnoses: 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 -  Potential duplicate medications found. Please discuss with provider.      STOP taking these medications       glucose 40 % (400 mg/mL) gel Comments:   Reason for Stopping:         oxyCODONE (ROXICODONE) 5 MG tablet Comments:   Reason for Stopping:         oxyCODONE (ROXICODONE) 5 MG tablet Comments:   Reason for Stopping:         Skin Protectants, Misc. (EUCERIN) cream Comments:   Reason for Stopping:             Allergies   Allergies   Allergen Reactions     Bacitracin Rash     Bactroban is effective; No difficulties     Erythromycin      intolerant.     Meperidine Hcl      intolerant only   Demerol     Chloraprep One Step Rash

## 2021-06-04 NOTE — PROGRESS NOTES
[facility]    Progress Note - Maximilian 3 Service        Date of Admission:  5/26/2021    Assessment & Plan       Olga Bailey is a 75 year old female who has a history of Glioblastoma s/p resection in 03/2021, anxiety, depression, and HTN and is admitted after having a seizure at the TCU.     Today:  - Currently medically stable for discharge awaiting TCU placement  - Will have placement 6/5 at 1600    Seizure 2/2 GBM  Previous seizure on 5/11 during hospitalization. Has been on low dose Levetiracetam up to 750 mg BD at discharge. On arrival, patient was in post-ictal state. Neurology consulted, suspect seizure secondary to GBM. Previous EEG imaging demonstrated seizure foci at GBM lesion. Levetiracetam level elevated at 49 on arrival, day of seizure. No additional seizures while inpatient.  - Neurology consulted, signed off at this time  - Levetiracetam 1250 mg BID  - dexamethasone 2 mg bid x 7days, followed by 2 mg qday for 7 days  - omeprazole 20 mg qday for duration of dexamethasone treatment  - If another seizure occurs, give 300 mg Vimpat IV load and 100 mg Vimpat bid     Glioblastoma Multiforme  Left frontoparietal brain glioblastoma status post resection 2/23/2021. Seen by radiation oncology 3/24 recommended XRT and chemo radiation. However subsequently admitted for resp failure. Hem/onc was initially concerned that she might not be a candidate for chemotherapy or radiation currently due to poor performance status. In June, will assess for whether or not to initiate temozolomide at adjuvant-dosing  - 1 month follow up with Dr. Baker  - Completed 15 sessions of radiation on 5/27     Recent history of Presumed PJP Pneumonia  Continue supplemental O2 as needed, due to steroids will continue home dose of bactrim.  - PTA bactrim     Urinary Retention  Uncomplicated UTI  Patient without dysuria and presented with right flank pain. UA in ED with leuk esterase and WBC  elevated. Afebrile and CBC without leukocytosis. Awaiting culture results.Continues to have intermittent urinary retention with need for catheterization, if this continues will need mccray placement.  - Completed UTI treatment course with PO Cefidinir     Major depressive disorder, recurrent  Anxiety  Follows with psychiatry and psychology as outpatint.  Saw health psychology during admission for GBM. Seeing health psychology while at TCU.   - Seroquel 25 mg BID + 50 mg at bedtime  - Ativan 0.5 mg q4h PRN  - Scheduled doxepin PRN at night for sleep  - PTA Buspar  - PTA prozac 60 mg     Bilateral lower extremity DVT  Right retroperitoneal hematoma   On 3/29 at outside hospital patient's O2 needs increased, duplex ultrasound revealed bilateral lower extremity DVT. CT PE negative for embolism.  Treated with IV heparin and transitioned to Lovenox 70 mg. On 4/14 this was complicated by retroperitoneal bleed, requiring 4 units of packed red blood cells.  Lovenox was held, and IVC filter was placed on 4/16. Vascular surgery was involved, and a CT abdomen was stable bleed so no surgical intervention was required.  Eventually resumed on prophylactic 40mg of Lovenox daily.  - Continue 40 mg Lovenox qday     Constipation  - Continue prior Bowel regimen: linaclotide, mesalamine, MiraLAX, simethicone PRN     Non-severe malnutrition  Previous admission required TPN and nutrition consulted.  - Nutrition consulted.    Hypernatremia (Resolved)  Suspect secondary to decreased oral intake. Encouraging oral intake at this time and monitoring.  - Encourage PO intake     Hypokalemia (Resolved)  Potassium of 3.2 on 5/27, resolved on 5/28. Continuing to monitor    Hypertension  Started on metoprolol at OSH for sinus tachycardia.  - PTA amlodipine 10 mg qday     Chronic medical problems:  - albuterol PRN  - GERD: back to PTA PO PPI     Diet: Combination Diet Dysphagia Diet Level 2: Mechan Altered; Nectar Thickened Liquids (pre-thickened or  "use instant food thickener)  Snacks/Supplements Adult: Magic Cup; With Meals  Room Service  Advance Diet as Tolerated    Fluids: None  Lines: PIV  DVT Prophylaxis: Enoxaparin (Lovenox) SQ  Martino Catheter: not present  Code Status: Full Code         Disposition Plan   Expected discharge: Tomorrow, recommended to transitional care unit once placement found.     The patient's care was discussed with the Attending Physician, Dr. Bates.    Sarah Kelsey MD  [address]  Please see sign in/sign out for up to date coverage information  ______________________________________________________________________    Interval History   Nursing notes and vital signs reviewed. Ms. Bailey states she is doing well and has no complaints today. She continues to have some swelling in her L foot which she feels is worsening due to it extending to her ankle. She is sad to be leaving since \"I am treated well her\" but knows it is good to keep moving forward.     Data reviewed today: I reviewed all medications, new labs and imaging results over the last 24 hours. I personally reviewed no images or EKG's today.    Physical Exam   Vital Signs: Temp: 96.6  F (35.9  C) Temp src: Temporal BP: 109/67 Pulse: 96   Resp: 18 SpO2: 97 % O2 Device: None (Room air)    Weight: 150 lbs 1.6 oz     Constitutional: awake, alert, cooperative, no apparent distress in bed watching movie   Eyes: pupils equal and round, extra ocular muscles intact, sclera clear, conjunctiva normal  Respiratory: No increased work of breathing, good air exchange, clear to auscultation bilaterally, no wheezing.  Cardiovascular: Regular rate and rhythm, and no murmur noted  GI: soft, non-distended, non-tender  Skin: no bruising or bleeding and normal skin color, texture, turgor  Musculoskeletal: L foot swelling without extension to ankle, no calf pain, full ROM, no tenderness nor erythema  Neurologic: Awake, alert, " oriented to name, place and time.     Data   Recent Labs   Lab 06/04/21  0824 06/03/21  1558 06/03/21  0711 06/01/21  0728 05/31/21  0634 05/29/21  0706 05/29/21  0706   WBC  --   --   --   --  8.0  --   --    HGB  --   --   --   --  10.1*  --   --    MCV  --   --   --   --  96  --   --      --   --  258 262  --  204   NA  --   --   --   --  141  --  145*   POTASSIUM  --  4.0 3.4 3.6 3.8   < > 3.3*   CHLORIDE  --   --   --   --  106  --  112*   CO2  --   --   --   --  28  --  26   BUN  --   --   --   --  12  --  8   CR 0.57  --   --   --  0.50*  --  0.43*  0.46*   ANIONGAP  --   --   --   --  7  --  7   ESTIVEN  --   --   --   --  9.0  --  8.9   GLC  --   --   --   --  106*  --  118*    < > = values in this interval not displayed.     No results found for this or any previous visit (from the past 24 hour(s)).

## 2021-06-04 NOTE — PLAN OF CARE
Assumed care of Patient from [id number]. VSS. Pt A/Ox3 disoriented to time. Baseline BUE tremors and right sided drift. All other nueros intact. Patient reports no pain or nausea. Patient passing flatus and had one BM overnight.  Pt voiding in bed pan and sometimes incontinent of urine. No symptoms of seizures overnight. Pt turned and repostitioned Q2hr. Pt able to self reposition in bed. Will continue plan of care.

## 2021-06-04 NOTE — PROGRESS NOTES
Care Management Discharge Note    Discharge Date: 06/04/21  Expected Time of Departure: 4:00pm    Discharge Disposition:   [facility] 4th floor  [address] [place] [address]  [id number]  P: [phone]  F: [phone]  RN to RN (Call after 3:30pm):  [phone]    Discharge Services: Therapies    Discharge Transportation: SW arranged a w/c transport via [facility] (P:  [phone])    Private pay costs discussed: Not applicable    PAS Confirmation Code:  Completed on 5/15- [id number]  Patient/family educated on Medicare website which has current facility and service quality ratings: yes    Education Provided on the Discharge Plan:  yes  Persons Notified of Discharge Plans: Attending team, Charge RN, Bedside RN and Usha (TCU Liaison)  Patient/Family in Agreement with the Plan: yes-  SW confirmed both pt and La Nena (Daughter) are in agreement    Handoff Referral Completed: No- BPA did not     Additional Information:  Pt remains medically ready to discharge.  SW spoke with Usha (TCU Liaison) they can tentatively accept pt at 4pm, they will not be able to confirm until 3:30pm.  SW tentatively arranged a 4pm w/c transport.  SW spoke with pt and her daughter and confirmed they are both in agreement.  Care team notified of discharge plans.    Addendum:  SW notified at 2pm that [facility] does not have staffing to accept pt today, they are hoping they will tomorrow.  Discharge cancelled for today.  SW rescheduled transport to 4pm tomorrow (tentative, pending staffing).  SW notified Charge RN, Bedside RN, Daughter (left vm), Dr. Kelsey (via pager) and patient.  Will ask weekend SW to follow-up.    MACARIO Bee, [facility]  Phone:  [phone]   Pager:  [phone]

## 2021-06-04 NOTE — PROGRESS NOTES
CLINICAL NUTRITION SERVICES - REASSESSMENT NOTE     Nutrition Prescription    RECOMMENDATIONS FOR MDs/PROVIDERS TO ORDER:  None at this time     Malnutrition Status:    Non-severe malnutrition in the context of acute on chronic illness    Recommendations already ordered by Registered Dietitian (RD):  None additional at this time     Future/Additional Recommendations:  Continue to encourage good PO intakes.  Offer snacks/supplmets if appetite low.  Monitor weight trends.      EVALUATION OF THE PROGRESS TOWARD GOALS   Diet: Dysphagia Level 2:  Mechanically Altered + Nectar Thickened liquids.  Magic Cup with meals. Room Service with Assist.    Intake: Tolerating diet the past week per chart review, mostly 3 meals/day ordered from kitchen. Fair-good appetite the past week documented, eating mostly 100% of meals documented in flowsheets the past week. Claudio counts below not quite meeting needs, but question if some items are missing/documentation error.    Calorie Counts  5/30: 526 kcal and 20 g protein  5/29: 1241 kcal and 60 g protein  5/28: Per Epic food record, pt consumed 100% at 6pm but not enough information was given to calculate calories/protein.   *3-day avg PO intake = 589 kcal and 27 g protein (42% kcal needs and 48% protein needs)     NEW FINDINGS   Weight: 4 lb wt loss from 5/29-6/3 (2.5% wt loss), however this is back near recent low weight of 67.1 kg on 5/10, and pt is on lasix so could be fluid related.  Dosing weight remains 56 kg (adjusted dosing weight)    Meds: Lasix    MALNUTRITION  % Intake: Suspect decreased intake does not meet criteria  % Weight Loss: possible > 2% in 1 week (severe)  Subcutaneous Fat Loss: Facial region:  Mild (visual today)  Muscle Loss: Temporal:  Mild (visual)  Fluid Accumulation/Edema: None noted per chart review today  Malnutrition Diagnosis: Non-severe malnutrition in the context of acute on chronic illness  *Visual assessment of upper body only today due to pt very  tired    Previous Goals   Ave po intakes per calorie counts x 3 days to meet at least 75% of pt's estimated nutritional needs (1050 calories and 50 g PRO)  Evaluation: Not met, but suspect calorie count recording/documentation may not have been accurate    Previous Nutrition Diagnosis  Predicted inadequate nutrient intake (calorie-protein) related to previously eating well PTA with meeting baseline calorie and protein needs per RD note on 5/26, but has h/o dysphagia, although appropriate for NDD2 with nectar thickened liquids, but now presenting with acute altered mental status that may hinder po adequacy.  Evaluation: No change, updated    CURRENT NUTRITION DIAGNOSIS  Predicted inadequate nutrient intake (protein-energy) related to fair-good appetite the past week and eating mostly 100% of meals documented in flowsheets the past week    INTERVENTIONS  Implementation  None additional at this time (per unit rounds, pt is discharging this afternoon)    Goals  Patient to consume % of nutritionally adequate meal trays TID, or the equivalent with supplements/snacks.    Monitoring/Evaluation  Progress toward goals will be monitored and evaluated per protocol.     Michelle Oconnor RD, LD  Pager: [phone]  Units covered: 7C (all beds) and 5A (beds 6381 through 5211-2)

## 2021-06-04 NOTE — PROGRESS NOTES
"SPIRITUAL HEALTH SERVICES  [facility] 7C  REFERRAL SOURCE: LOS  Late Entry    Pt was seated on her bedside chair. Introduced spiritual health services, engaged in listening, reflective conversation, offered theological re-framing, and prayer.    Pt is struggling with her current medical situation, family issues, adding \"I've had a hard life.\" Pt wonders about the time she has left in life- expresses the desire to keep living, \"b/c I still have things I want to do.\"     Pt was able to engage in reflective conversation and consider shifts in her thinking patterns. At the end of our visit pt stated \"you've given me things to think about that I've never considered.\"     PLAN: Will follow-up next week if pt remains on the unit.     Rev. Olya Wright MDiv, [facility]  Staff    Pager [phone]  * [facility] remains available 24/7 for emergent requests/referrals, either by having the switchboard page the on-call  or by entering an ASAP/STAT consult in Epic (this will also page the on-call ).*   "

## 2021-06-04 NOTE — PLAN OF CARE
Alert and oriented, forgetful at times. Denies pain when asked. Up with assist of 1, GB and walker. Worked with OT and PT this shift. Tolerating DD2 and nectar thin liquid, good appetite. Incontinent of bladder x 2, with heavy soaked pads. Bowel sound audible. PIV-SL. Bed alarm on for safety.   PLAN: Discharge to  TCU this afternoon once bed is available.

## 2021-06-04 NOTE — PROCEDURES
Radiotherapy Treatment Summary          Date of Report: May 31, 2021     PATIENT: RADHA RYAN  MEDICAL RECORD NO: [id number]  : 1945     DIAGNOSIS: C71.1 Malignant neoplasm of frontal lobe  INTENT OF RADIOTHERAPY: Curative   PATHOLOGY: Glioblastoma, IDH1/IDH2 wild type, ATRX intact, p53 and PTEN mutant, BRAF non-mutated,   MGMT promoter methylated.                                   STAGE: N/A  CONCURRENT SYSTEMIC THERAPY: None                   Details of the treatments summarized below are found in records kept in the Department of Radiation Oncology at [facility].     Treatment Summary:  Radiation Oncology - Course: 1 Protocol:   Treatment Site Dose  Modality From To Elapsed Days Fx.  [id number]    4,005 cGy 06 X  5/04/2021  2021  23 15          Dose per Fraction: 267 cGy/fraction       Total Dose: 4,005 cGy               COMMENTS:                         Ms. Ryan is a 75 year old female with known diagnosis of glioblastoma. She first presented with progressive aphasia in   2021, and underwent brain MRI with and without contrast on 2021, which revealed a 3.3 x 2.8 x 2.8 cm   enhancing mass in left frontal-parietal region. The patient underwent left parietal craniotomy and computer-assisted   stereotactic volumetric resection of the tumor using neuro-navigation with Dr. Cummings of Neurosurgery at [facility] on 2021. Surgical pathology, and subsequent glioma focused NGS panel together confirmed her diagnosis   as outlined above.      She experienced prolonged hospitalization post-surgery for complications including DVT and pneumonia. In this   interval, her performance status has declined with need for increased respiratory support. She was transferred to [facility]   and proceeded with adjuvant radiotherapy alone using hypofractionation scheme as outlined above, while she was   continued on close monitoring of her respiratory status. Given her performance status,  "radiation treatment alone was   offered.     She was discharged to our transitional care unit on 5/15/2021 and she continued to receive radiation as she undergo   rehabilitation. On 5/26/2021, she had a seizure episode, prompting emergent hospitalization to [facility] for workup. CT   head yielded no acute findings or change. Neurology was consulted, and she was started on Keppra and Decadron   tapering (2 mg BID for 1 wk, 2 mg daily for 1 wk). This led to her missing one fraction of treatment, which was made up   on a later date. Otherwise, Ms. Bailey tolerated her treatment well, with development of fatigue as anticipated. On her   last day of treatment, she reported vision consistent with \"dirty glasses\" that has occurred throughout the duration of her   hospital stay and is stable. No other associated symptoms were reported.       ED visits/hospitalizations: Ms. Bailey was treated as an inpatient of [facility].      Missed treatments: missed 3 fractions: 5/11/2021 (s/p seizure), 5/18/21 (radiation machine repair), 5/26/2021   (s/p seizure). These fractions were made up at the end of treatment and the full intended RT dose was   delivered.      Acute Toxicity Profile by CTC v5.0: Fatigue: Grade 1: Fatigue relieved by rest     PAIN MANAGEMENT: Not indicated                             FOLLOW UP PLAN:       1. Steroids taper. Prophylactic steroids are not needed for radiation therapy.                    2. Follow up virtual visit with Dr. Spann in 1 month to check in on radiation related side effects and inquire about visual   symptoms.   3. Post-treatment brain MRI on 6/22/21 ordered by Dr. Baker with same day follow up.          Resident Physician: Dave Rios M.D.   Staff Physician: Betsy Spann M.D.  Physicist: Curt Real, PhD     CC:   Stephanie Baker MD  Neurology Department  [facility] [address] [place] [address]     Ata Diane MD, PhD  Chair in Neurosurgery and "   [address] [facility] [address], [address]  [facility] [address]                                     Radiation Oncology:  [facility] [address] [place] [address]

## 2021-06-05 ENCOUNTER — HOSPITAL ENCOUNTER (INPATIENT)
Facility: SKILLED NURSING FACILITY | Age: 76
LOS: 40 days | Discharge: NURSING, REHAB OR LONG-TERM CARE | DRG: 055 | End: 2021-07-15
Attending: INTERNAL MEDICINE | Admitting: INTERNAL MEDICINE
Payer: MEDICARE

## 2021-06-05 VITALS
DIASTOLIC BLOOD PRESSURE: 75 MMHG | HEART RATE: 75 BPM | BODY MASS INDEX: 28.79 KG/M2 | SYSTOLIC BLOOD PRESSURE: 121 MMHG | TEMPERATURE: 97.1 F | HEIGHT: 63 IN | OXYGEN SATURATION: 96 % | WEIGHT: 162.48 LBS | RESPIRATION RATE: 16 BRPM

## 2021-06-05 DIAGNOSIS — C71.9 GLIOBLASTOMA (H): ICD-10-CM

## 2021-06-05 DIAGNOSIS — R13.19 OTHER DYSPHAGIA: ICD-10-CM

## 2021-06-05 DIAGNOSIS — J44.9 CHRONIC OBSTRUCTIVE PULMONARY DISEASE, UNSPECIFIED COPD TYPE (H): ICD-10-CM

## 2021-06-05 DIAGNOSIS — H04.123 DRY EYES: ICD-10-CM

## 2021-06-05 DIAGNOSIS — K56.49 OTHER IMPACTION OF INTESTINE (H): ICD-10-CM

## 2021-06-05 DIAGNOSIS — E87.6 HYPOKALEMIA: ICD-10-CM

## 2021-06-05 DIAGNOSIS — G40.909 RECURRENT SEIZURES (H): ICD-10-CM

## 2021-06-05 DIAGNOSIS — B00.2 PRIMARY HSV INFECTION OF MOUTH: ICD-10-CM

## 2021-06-05 DIAGNOSIS — R53.81 PHYSICAL DECONDITIONING: ICD-10-CM

## 2021-06-05 DIAGNOSIS — K59.00 CONSTIPATION, UNSPECIFIED CONSTIPATION TYPE: ICD-10-CM

## 2021-06-05 DIAGNOSIS — F43.21 ADJUSTMENT DISORDER WITH DEPRESSED MOOD: ICD-10-CM

## 2021-06-05 DIAGNOSIS — C71.9 GBM (GLIOBLASTOMA MULTIFORME) (H): ICD-10-CM

## 2021-06-05 DIAGNOSIS — E87.1 HYPONATREMIA: ICD-10-CM

## 2021-06-05 DIAGNOSIS — I10 ESSENTIAL HYPERTENSION: ICD-10-CM

## 2021-06-05 DIAGNOSIS — K21.9 GASTROESOPHAGEAL REFLUX DISEASE WITHOUT ESOPHAGITIS: ICD-10-CM

## 2021-06-05 DIAGNOSIS — J96.01 ACUTE RESPIRATORY FAILURE WITH HYPOXIA (H): ICD-10-CM

## 2021-06-05 DIAGNOSIS — R09.02 HYPOXIA: ICD-10-CM

## 2021-06-05 DIAGNOSIS — F41.9 ANXIETY: ICD-10-CM

## 2021-06-05 DIAGNOSIS — N12 PYELONEPHRITIS: Primary | ICD-10-CM

## 2021-06-05 DIAGNOSIS — D69.6 THROMBOCYTOPENIA (H): ICD-10-CM

## 2021-06-05 DIAGNOSIS — Z13.6 CARDIOVASCULAR SCREENING; LDL GOAL LESS THAN 160: ICD-10-CM

## 2021-06-05 DIAGNOSIS — Z79.890 NEED FOR PROPHYLACTIC HORMONE REPLACEMENT THERAPY (POSTMENOPAUSAL): ICD-10-CM

## 2021-06-05 LAB
CREAT SERPL-MCNC: 0.62 MG/DL (ref 0.52–1.04)
GFR SERPL CREATININE-BSD FRML MDRD: 88 ML/MIN/{1.73_M2}

## 2021-06-05 PROCEDURE — 99238 HOSP IP/OBS DSCHRG MGMT 30/<: CPT | Mod: GC | Performed by: INTERNAL MEDICINE

## 2021-06-05 PROCEDURE — 250N000013 HC RX MED GY IP 250 OP 250 PS 637: Performed by: STUDENT IN AN ORGANIZED HEALTH CARE EDUCATION/TRAINING PROGRAM

## 2021-06-05 PROCEDURE — 82565 ASSAY OF CREATININE: CPT | Performed by: PHYSICIAN ASSISTANT

## 2021-06-05 PROCEDURE — 36415 COLL VENOUS BLD VENIPUNCTURE: CPT | Performed by: PHYSICIAN ASSISTANT

## 2021-06-05 PROCEDURE — 250N000013 HC RX MED GY IP 250 OP 250 PS 637: Performed by: PHYSICIAN ASSISTANT

## 2021-06-05 PROCEDURE — 120N000009 HC R&B SNF

## 2021-06-05 PROCEDURE — 250N000011 HC RX IP 250 OP 636: Performed by: PHYSICIAN ASSISTANT

## 2021-06-05 PROCEDURE — 5A09357 ASSISTANCE WITH RESPIRATORY VENTILATION, LESS THAN 24 CONSECUTIVE HOURS, CONTINUOUS POSITIVE AIRWAY PRESSURE: ICD-10-PCS | Performed by: INTERNAL MEDICINE

## 2021-06-05 PROCEDURE — 250N000012 HC RX MED GY IP 250 OP 636 PS 637: Performed by: STUDENT IN AN ORGANIZED HEALTH CARE EDUCATION/TRAINING PROGRAM

## 2021-06-05 RX ORDER — PANTOPRAZOLE SODIUM 40 MG/1
40 TABLET, DELAYED RELEASE ORAL
Status: DISCONTINUED | OUTPATIENT
Start: 2021-06-05 | End: 2021-07-15 | Stop reason: HOSPADM

## 2021-06-05 RX ORDER — DEXAMETHASONE 1 MG
2 TABLET ORAL DAILY
Status: COMPLETED | OUTPATIENT
Start: 2021-06-06 | End: 2021-06-09

## 2021-06-05 RX ORDER — QUETIAPINE FUMARATE 25 MG/1
50 TABLET, FILM COATED ORAL AT BEDTIME
Status: DISCONTINUED | OUTPATIENT
Start: 2021-06-05 | End: 2021-06-15

## 2021-06-05 RX ORDER — ONDANSETRON 4 MG/1
4 TABLET, ORALLY DISINTEGRATING ORAL EVERY 6 HOURS PRN
Status: DISCONTINUED | OUTPATIENT
Start: 2021-06-05 | End: 2021-07-11

## 2021-06-05 RX ORDER — POLYETHYLENE GLYCOL 3350 17 G/17G
17 POWDER, FOR SOLUTION ORAL 2 TIMES DAILY PRN
Status: DISCONTINUED | OUTPATIENT
Start: 2021-06-05 | End: 2021-07-15 | Stop reason: HOSPADM

## 2021-06-05 RX ORDER — FUROSEMIDE 40 MG
40 TABLET ORAL DAILY
Status: DISCONTINUED | OUTPATIENT
Start: 2021-06-06 | End: 2021-07-15 | Stop reason: HOSPADM

## 2021-06-05 RX ORDER — CALCIUM CARBONATE 500 MG/1
1000 TABLET, CHEWABLE ORAL 4 TIMES DAILY PRN
Status: DISCONTINUED | OUTPATIENT
Start: 2021-06-05 | End: 2021-07-11

## 2021-06-05 RX ORDER — CARBOXYMETHYLCELLULOSE SODIUM 5 MG/ML
1 SOLUTION/ DROPS OPHTHALMIC
Status: DISCONTINUED | OUTPATIENT
Start: 2021-06-05 | End: 2021-07-11

## 2021-06-05 RX ORDER — SULFAMETHOXAZOLE AND TRIMETHOPRIM 400; 80 MG/1; MG/1
1 TABLET ORAL DAILY
Status: DISCONTINUED | OUTPATIENT
Start: 2021-06-06 | End: 2021-07-15 | Stop reason: HOSPADM

## 2021-06-05 RX ORDER — AMLODIPINE BESYLATE 5 MG/1
5 TABLET ORAL DAILY
Status: DISCONTINUED | OUTPATIENT
Start: 2021-06-06 | End: 2021-07-15 | Stop reason: HOSPADM

## 2021-06-05 RX ORDER — DOXEPIN HYDROCHLORIDE 10 MG/ML
3 SOLUTION ORAL AT BEDTIME
Status: DISCONTINUED | OUTPATIENT
Start: 2021-06-05 | End: 2021-06-15

## 2021-06-05 RX ORDER — QUETIAPINE FUMARATE 25 MG/1
25 TABLET, FILM COATED ORAL 2 TIMES DAILY
Status: DISCONTINUED | OUTPATIENT
Start: 2021-06-05 | End: 2021-06-11

## 2021-06-05 RX ORDER — BUSPIRONE HYDROCHLORIDE 15 MG/1
30 TABLET ORAL 2 TIMES DAILY
Status: DISCONTINUED | OUTPATIENT
Start: 2021-06-05 | End: 2021-07-15 | Stop reason: HOSPADM

## 2021-06-05 RX ORDER — CALCIUM POLYCARBOPHIL 625 MG 625 MG/1
625 TABLET ORAL DAILY
Status: DISCONTINUED | OUTPATIENT
Start: 2021-06-06 | End: 2021-07-15 | Stop reason: HOSPADM

## 2021-06-05 RX ORDER — POLYETHYLENE GLYCOL 3350 17 G/17G
17 POWDER, FOR SOLUTION ORAL DAILY PRN
Status: DISCONTINUED | OUTPATIENT
Start: 2021-06-05 | End: 2021-06-05 | Stop reason: ALTCHOICE

## 2021-06-05 RX ORDER — SIMETHICONE 80 MG
80 TABLET,CHEWABLE ORAL EVERY 6 HOURS PRN
Status: DISCONTINUED | OUTPATIENT
Start: 2021-06-05 | End: 2021-07-11

## 2021-06-05 RX ORDER — ACETAMINOPHEN 325 MG/1
650 TABLET ORAL EVERY 4 HOURS PRN
Status: DISCONTINUED | OUTPATIENT
Start: 2021-06-05 | End: 2021-07-15 | Stop reason: HOSPADM

## 2021-06-05 RX ORDER — ALBUTEROL SULFATE 90 UG/1
2 AEROSOL, METERED RESPIRATORY (INHALATION) EVERY 6 HOURS PRN
Status: DISCONTINUED | OUTPATIENT
Start: 2021-06-05 | End: 2021-07-15 | Stop reason: HOSPADM

## 2021-06-05 RX ADMIN — LEVETIRACETAM 1250 MG: 750 TABLET, FILM COATED ORAL at 08:13

## 2021-06-05 RX ADMIN — QUETIAPINE FUMARATE 25 MG: 25 TABLET ORAL at 08:12

## 2021-06-05 RX ADMIN — CALCIUM POLYCARBOPHIL 625 MG: 625 TABLET, FILM COATED ORAL at 08:12

## 2021-06-05 RX ADMIN — LINACLOTIDE 290 MCG: 145 CAPSULE, GELATIN COATED ORAL at 06:57

## 2021-06-05 RX ADMIN — QUETIAPINE FUMARATE 25 MG: 25 TABLET ORAL at 21:16

## 2021-06-05 RX ADMIN — LEVETIRACETAM 1250 MG: 750 TABLET, FILM COATED ORAL at 21:17

## 2021-06-05 RX ADMIN — AMLODIPINE BESYLATE 5 MG: 5 TABLET ORAL at 08:12

## 2021-06-05 RX ADMIN — BUSPIRONE HYDROCHLORIDE 30 MG: 15 TABLET ORAL at 21:16

## 2021-06-05 RX ADMIN — DOXEPIN HYDROCHLORIDE 3 MG: 10 SOLUTION ORAL at 21:16

## 2021-06-05 RX ADMIN — BUSPIRONE HYDROCHLORIDE 30 MG: 10 TABLET ORAL at 08:12

## 2021-06-05 RX ADMIN — SULFAMETHOXAZOLE AND TRIMETHOPRIM 1 TABLET: 400; 80 TABLET ORAL at 08:13

## 2021-06-05 RX ADMIN — QUETIAPINE FUMARATE 50 MG: 25 TABLET ORAL at 21:16

## 2021-06-05 RX ADMIN — PANTOPRAZOLE SODIUM 40 MG: 40 TABLET, DELAYED RELEASE ORAL at 06:57

## 2021-06-05 RX ADMIN — ENOXAPARIN SODIUM 40 MG: 40 INJECTION SUBCUTANEOUS at 21:16

## 2021-06-05 RX ADMIN — FUROSEMIDE 40 MG: 20 TABLET ORAL at 08:12

## 2021-06-05 RX ADMIN — PANTOPRAZOLE SODIUM 40 MG: 40 TABLET, DELAYED RELEASE ORAL at 17:27

## 2021-06-05 RX ADMIN — DEXAMETHASONE 2 MG: 2 TABLET ORAL at 08:14

## 2021-06-05 RX ADMIN — FLUOXETINE HYDROCHLORIDE 60 MG: 40 CAPSULE ORAL at 08:13

## 2021-06-05 ASSESSMENT — MIFFLIN-ST. JEOR: SCORE: 1201.13

## 2021-06-05 ASSESSMENT — ACTIVITIES OF DAILY LIVING (ADL)
ADLS_ACUITY_SCORE: 22
ADLS_ACUITY_SCORE: 22
ADLS_ACUITY_SCORE: 23

## 2021-06-05 NOTE — PLAN OF CARE
Patient is a 75 year old female  admitted to room 423 via wheelchair. Patient is alert and oriented X 3 with some forgetfulness. Bed alarm activated for safety. See Epic for VS and assessment.  Patient is able to pivot transfer with assist of 2 using GB. Patient was settled into her room, shown call light, tv, mealtimes etc.Oriented to unit. Incontinet of bladder. Purewick in place. Incontinent of BM-loose x 2. Denied pain, SOB, headache, chest pain and nausea. Skin intact, ex  adominal and R foot bruising. Red spots or right leg. Blanchable redness on sacrum/coccyx area. BUE slight tremors noted. Takes a Dysphagia Diet Level 2: mechanical soft texture and nectar thick liquids. Good appetite ate 100% of dinner. On seizure precaution. Refused to use home CPAP. Pt. daughter La Nena notified pt. arrival. Pleasant and cooperative with cares. Will continue monitoring VS. Notifying MD with any concerns.  Follow MD orders for cares and medications. Sleeping with call light in reach.     Level of Schooling: [employer] grad  Ethnicity:  Marital Status:No  Dentures: No  Hearing Aid: No  Smoker:  No  Glasses: Yes  Occupation: RN  Falls 0 mo:  2-6 mo: 0, fall more than 6 months ago  Stairs prior function: able to climb stairs prior to admission   Prior device use: walker   Advanced Care Directive Referral to Social Work?Yes

## 2021-06-05 NOTE — PLAN OF CARE
Ax2 pivot to the commode. Minimal urine output overnight. Needs encouragement to maintain adequate oral intake. 0200 Bladder scan with only 26 ml or less. Up to BSC with 50 ml output. Refused cpap overnight.     Problem: OT General Care Plan  Goal: Toilet Transfer/Toileting (OT)  Description: Toilet Transfer/Toileting (OT)  Outcome: No Change

## 2021-06-05 NOTE — PROGRESS NOTES
Care Management Follow Up    Length of Stay (days): 10    Expected Discharge Date: 06/04/21  Expected Time of Departure: 4:00pm  Concerns to be Addressed: all concerns addressed in this encounter     Patient plan of care discussed at interdisciplinary rounds: Confirmed readiness for discharge today.     Anticipated Discharge Disposition: Transitional Care    Additional Information:  Confirmed with medical team, Sarah Kelsey MD, that  is medically ready for discharge today.Confirmed bed availability with [facility]CU liaison. Ride via [employer] [facility] confirmed for 1600 today. Please contact social work with any issues related to today's discharge.     MACARIO Freeman, LICSW  [facility]     Searchable on [place]-search SOCIAL WORK - for text paging options    Saturday & Sunday & Holidays  On-Call Pager [phone]    4A 4C 4E 5A 5B [phone]    6A 6B 6C 6D  [phone]    5C 7A 7B 7C 7D [phone]    For Hours 1600-Midnight Pager [phone]
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
## 2021-06-05 NOTE — PLAN OF CARE
Patient cares from 15:30 to 19:30    Patient is alert, oriented x 4, forgetful at times, bed alarm on. PIV in place. Pt is voiding spontaneously in commode, reports positive flatus, no BM. Pt on seizure precautions, SBA of 2. Plan is to discharge to TCU tomorrow.

## 2021-06-05 NOTE — PLAN OF CARE
"/75 (BP Location: Right arm)   Pulse 75   Temp 97.1  F (36.2  C) (Temporal)   Resp 16   Ht 1.6 m (5' 3\")   Wt 73.7 kg (162 lb 7.7 oz)   SpO2 96%   BMI 28.78 kg/m    0730 - 1600  Reason for admission: Came from TCU after having seizure.  Neuro: A&Ox4.   Cardiac: SR. VSS.   Respiratory: Sating  96% on RA.  GI/:No bm.  Adequate urine output.  Diet/appetite: Tolerating Dysphagia nectar thicken liquids diet. Eating well.  Activity:  Assist of two up to chair and commode.  Pain: At acceptable level on current regimen.   Skin: No new deficits noted.  LDA's:PIV removed.  New this shift: Transferred to [place] [facility] at 1600 via wheelchair with [facility]. Writer gave report to TCU nurse.  Plan: Continue with POC. Notify primary team with changes.  "

## 2021-06-06 PROCEDURE — 97162 PT EVAL MOD COMPLEX 30 MIN: CPT | Mod: GP

## 2021-06-06 PROCEDURE — 120N000009 HC R&B SNF

## 2021-06-06 PROCEDURE — 250N000011 HC RX IP 250 OP 636: Performed by: PHYSICIAN ASSISTANT

## 2021-06-06 PROCEDURE — 99305 1ST NF CARE MODERATE MDM 35: CPT | Performed by: INTERNAL MEDICINE

## 2021-06-06 PROCEDURE — 97535 SELF CARE MNGMENT TRAINING: CPT | Mod: GO | Performed by: OCCUPATIONAL THERAPIST

## 2021-06-06 PROCEDURE — 99207 PR NO BILLABLE SERVICE THIS VISIT: CPT | Performed by: PHYSICIAN ASSISTANT

## 2021-06-06 PROCEDURE — 97530 THERAPEUTIC ACTIVITIES: CPT | Mod: GP

## 2021-06-06 PROCEDURE — 250N000013 HC RX MED GY IP 250 OP 250 PS 637: Performed by: PHYSICIAN ASSISTANT

## 2021-06-06 PROCEDURE — 97165 OT EVAL LOW COMPLEX 30 MIN: CPT | Mod: GO | Performed by: OCCUPATIONAL THERAPIST

## 2021-06-06 PROCEDURE — 250N000012 HC RX MED GY IP 250 OP 636 PS 637: Performed by: PHYSICIAN ASSISTANT

## 2021-06-06 RX ADMIN — QUETIAPINE FUMARATE 25 MG: 25 TABLET ORAL at 08:22

## 2021-06-06 RX ADMIN — FLUOXETINE HYDROCHLORIDE 60 MG: 20 CAPSULE ORAL at 08:22

## 2021-06-06 RX ADMIN — QUETIAPINE FUMARATE 50 MG: 25 TABLET ORAL at 21:18

## 2021-06-06 RX ADMIN — ENOXAPARIN SODIUM 40 MG: 40 INJECTION SUBCUTANEOUS at 21:18

## 2021-06-06 RX ADMIN — SULFAMETHOXAZOLE AND TRIMETHOPRIM 1 TABLET: 400; 80 TABLET ORAL at 08:22

## 2021-06-06 RX ADMIN — DOXEPIN HYDROCHLORIDE 3 MG: 10 SOLUTION ORAL at 21:18

## 2021-06-06 RX ADMIN — FUROSEMIDE 40 MG: 40 TABLET ORAL at 08:22

## 2021-06-06 RX ADMIN — LEVETIRACETAM 1250 MG: 750 TABLET, FILM COATED ORAL at 08:22

## 2021-06-06 RX ADMIN — QUETIAPINE FUMARATE 25 MG: 25 TABLET ORAL at 21:18

## 2021-06-06 RX ADMIN — DEXAMETHASONE 2 MG: 1 TABLET ORAL at 08:22

## 2021-06-06 RX ADMIN — PANTOPRAZOLE SODIUM 40 MG: 40 TABLET, DELAYED RELEASE ORAL at 16:22

## 2021-06-06 RX ADMIN — AMLODIPINE BESYLATE 5 MG: 5 TABLET ORAL at 08:22

## 2021-06-06 RX ADMIN — LINACLOTIDE 290 MCG: 145 CAPSULE, GELATIN COATED ORAL at 06:39

## 2021-06-06 RX ADMIN — CALCIUM POLYCARBOPHIL 625 MG: 625 TABLET, FILM COATED ORAL at 08:22

## 2021-06-06 RX ADMIN — BUSPIRONE HYDROCHLORIDE 30 MG: 15 TABLET ORAL at 08:22

## 2021-06-06 RX ADMIN — BUSPIRONE HYDROCHLORIDE 30 MG: 15 TABLET ORAL at 21:19

## 2021-06-06 RX ADMIN — PANTOPRAZOLE SODIUM 40 MG: 40 TABLET, DELAYED RELEASE ORAL at 06:39

## 2021-06-06 RX ADMIN — LEVETIRACETAM 1250 MG: 750 TABLET, FILM COATED ORAL at 21:18

## 2021-06-06 ASSESSMENT — ACTIVITIES OF DAILY LIVING (ADL): PREVIOUS_RESPONSIBILITIES: MEAL PREP;HOUSEKEEPING;LAUNDRY;SHOPPING;YARDWORK;MEDICATION MANAGEMENT;FINANCES;DRIVING

## 2021-06-06 NOTE — PLAN OF CARE
Discharge Planner Post-Acute Rehab OT:     Discharge Plan: Pt reports plan remains to return to her daughter's home with intermittent supervision.  Pt also anticipates continued assistance with finances, medication management, meal prep, general IADL, and bathing.  Pt would need to be independent and safe with toileting and light meal prep.    Precautions: falls    Current Status:  ADLs: Currently pt requires min A with functional transfers, anticipate mod A with LB cares and min A with UB  IADLs: NT  Vision/Cognition: Pt oriented, but not able to recall if she had ordered lunch.  Pt reports some difficulty reading as well    Assessment: Pt limited by fatigue, poor activity tolerance, possible cognitive changes and currently requires assistance with all BADL.  Pt stated goal of discharge to daughter's home with intermittent assistance.  Pt will benefit from skilled OT to address deficits, teach compensatory strategies, provide family training as needed, and assist with disposition planning.    Other Barriers to Discharge (DME, Family Training, etc): Safety, family training

## 2021-06-06 NOTE — PROGRESS NOTES
"   06/06/21 1100   Quick Adds   Quick Adds Certification   Type of Visit Initial Occupational Therapy Evaluation   Living Environment   Current Living Arrangements house  (plans to DC to daughter's house)   Transportation Anticipated agency;family or friend will provide   Living Environment Comments  Pt anticuipated d/c to daughters home. Daughter will be avalible intermittent to A when not working.  Pt will carrie to be able to complete flight of stairs in home to up stairs for bedroom and bath.  1 railing presnt for inside and outside steps.   Self-Care   Usual Activity Tolerance good   Current Activity Tolerance moderate   Equipment Currently Used at Home cane, straight;walker, rolling   Disability/Function   Hearing Difficulty or Deaf no   Wear Glasses or Blind yes   Vision Management glasses   Concentrating, Remembering or Making Decisions Difficulty no   Difficulty Communicating no   Communication   (occassional difficulty with word finding)   Difficulty Eating/Swallowing no   Eating/Swallowing swallowing liquids   Walking or Climbing Stairs Difficulty no   Dressing/Bathing Difficulty yes   Dressing/Bathing bathing difficulty, requires equipment;bathing difficulty, assistance 1 person   Dressing/Bathing Management shower chair   Toileting issues yes   Toileting Management grab bars   Toileting Assistance toileting difficulty, assistance 1 person   Doing Errands Independently Difficulty (such as shopping) no   Errands Management family assisted   Fall history within last six months no   Number of times patient has fallen within last six months 0   Change in Functional Status Since Onset of Current Illness/Injury yes   General Information   Onset of Illness/Injury or Date of Surgery 05/26/21   Patient/Family Therapy Goal Statement (OT) \"To get home and move around\"   Additional Occupational Profile Info/Pertinent History of Current Problem 76 y/o female admitted on 5/26/2021 for breakthrough seizures secondary to " GBM.  Pt had previously been hospitalized 5/11/21 and had seizures at that time.  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  Other PMH includes anxiety/depression, b/l LE DVT 3/29/21, retroperitoneal bleed 4/14/21, HTN.  Pt has her own house and was living independently until the medical issues of this year.  Most recently she has been living with her daughter who provides IADL and assistance with higher demand BADL.  Pt previously on this unit in May of this year.     Performance Patterns (Routines, Roles, Habits) Prior to most recent medical issues pt was living alone in her home fully independent working part-time as an usher at [employer].  With recent health issues pt has lived at her daugher's house and plans to discharge there from this TCU stay.  Pt enjoys long walks and being with family.   Cognitive Status Examination   Orientation Status   (pt oriented to self date and place)   Affect/Mental Status (Cognitive) WNL   Follows Commands follows one-step commands   Cognitive Status Comments OT: Pt will benefit from full cognitive evaluation.  Errors noted during evaluation though pt carries conversation well.  She also presents with impairments in attention, memory, and reasoning   Visual Perception   Impact of Vision Impairment on Function (Vision) Pt reports some decreased acuity in vision.  Pt was able to read clock and single word font size 14.  Question if cognition is impacting pt's ability to read more than vision changes.   Range of Motion Comprehensive   Comment, General Range of Motion RUE limited to 60 degrees flexion, LUE up to 100 degrees.   Pt reports right shoulder limitations are secondary to previous surgery that resulted in nerve damage.   Coordination   Coordination Comments OT: Pt required assistance to open packages and fasten gown.  Pt reports handwritting is very difficult now as well.   Bed Mobility   Comment (Bed Mobility) OT: Pt required min A to once she  had milind trunk up to scoot fully forward with feet on floor.   Transfers   Transfer Comments OT: Completed sit to stand from EOB with CGA for safety.  Pt with signiciantly flexed posture which she was able to correct with tactile and verbal cues.   Balance   Balance Comments OT: Posterior lean in standing   Instrumental Activities of Daily Living (IADL)   Previous Responsibilities meal prep;housekeeping;laundry;shopping;yardwork;medication management;finances;driving   IADL Comments OT: Pt anticipates discharging to her daughter's home where she will have support for all IADL and some higher demand BADL including bathing.  Pt reports she is comfortable with her  daughter assisting with bathing/dressing/financial management, and medication management as needed.     Clinical Impression   Criteria for Skilled Therapeutic Interventions Met (OT) yes   OT Diagnosis below baseline ADL function   OT Problem List-Impairments impacting ADL problems related to;activity tolerance impaired;balance;cognition;communication;coordination;mobility;strength;postural control   Assessment of Occupational Performance 3-5 Performance Deficits   Identified Performance Deficits Currently pt requires assistance with all functional transfers (bed chair toilet), dressing, toileting, functional mobility throughout room/house.     Planned Therapy Interventions (OT) ADL retraining;balance training;bed mobility training;cognition;groups;motor coordination training;home program guidelines;progressive activity/exercise;risk factor education   Clinical Decision Making Complexity (OT) low complexity   Therapy Frequency (OT) 6x/week   Predicted Duration of Therapy 7 days   Anticipated Equipment Needs Upon Discharge (OT) walker, rolling   Risk & Benefits of therapy have been explained evaluation/treatment results reviewed;care plan/treatment goals reviewed;risks/benefits reviewed;participants voiced agreement with care plan;participants  included;patient   Comment-Clinical Impression 76 y/o female known to this unit with a recent stay.  Pt was discharged from TCU d/t seizure 5/26/21.  Currently pt is limited by weakness, poor activity tolerance, possible cognitive changes.  Pt will benefit from skilled OT to address deficits for anticipated discharge to daughter's home.  Pt will need to be consistently safe and independent with household functional mobilty, toileting, and light meal prep.     Therapy Certification   Start of Care Date 06/06/21   Certification date from 06/06/21   Certification date to 07/04/21   Medical Diagnosis seizure   Total Evaluation Time (Minutes)   Total Evaluation Time (Minutes) 30  (30E 15 ADL)

## 2021-06-06 NOTE — PLAN OF CARE
Alert and oriented, VSS. Pleasant. Denies pain, no SOB. Appetite good, promoted nectar thick fluids. No skin concerns, bruising noted RLE and abdomen. Incontinent of BM x2, loose. Purewick out during the day. Declined mantoux d/t prior reaction, note left for provider.     Patient's most recent vital signs are:     Vital signs:  BP: 120/69  Temp: 97.8  HR: 85  RR: 16  SpO2: 95 %     Patient does not have new respiratory symptoms.  Patient does not have new sore throat.  Patient does not have a fever greater than 99.5.

## 2021-06-06 NOTE — PHARMACY-MEDICATION REGIMEN REVIEW
Pharmacy Medication Regimen Review  Olga Bailey is a 75 year old female who is currently in the Transitional Care Unit.    Assessment: Upon review of the medications and patient chart the following irregularities were found:     Medications without appropriate indications/durations:   -per medicine notes from [facility], dexamethasone 2 mg daily was to be continued for 7 days which would be through 6/9, no stop date entered  -bactrim prophylaxis to be continued for 1.5 months after steroids are stopped per ID    Significant Drug Interactions: fluoxetine and doxepin may cause serotonin syndrome, continue to monitor    Plan:   1. Add stop date to dexamethasone  2. Monitor for serotonin syndrome  3. Continue bactrim for 1.5 months after steroids stopped    Attending provider will be sent this note for review.  If there are any emergent issues noted above, pharmacist will contact provider directly by phone.      Pharmacy will periodically review the resident's medication regimen for any PRN medications not administered in > 72 hours and discontinue them. The pharmacist will discuss gradual dose reductions of psychopharmacologic medications with interdisciplinary team on a regular basis.    Please contact pharmacy if the above does not answer specific medication questions/concerns.    Background:  A pharmacist has reviewed all medications and pertinent medical history today.  Medications were reviewed for appropriate use and any irregularities found are listed with recommendations.    Jyotsna Hughes, TyroneD, BCPS    Current Facility-Administered Medications:      [START ON 6/8/2021] - Skin Test Reading -, , Does not apply, Q21 Days, Mariana Henriquez PA-C     acetaminophen (TYLENOL) tablet 650 mg, 650 mg, Oral, Q4H PRN, Mariana Henriquez PA-C     albuterol (PROAIR HFA/PROVENTIL HFA/VENTOLIN HFA) 108 (90 Base) MCG/ACT inhaler 2 puff, 2 puff, Inhalation, Q6H PRN, Mariana Henriquez PA-C     amLODIPine (NORVASC) tablet 5  mg, 5 mg, Oral, Daily, Hiniker, Mariana A, PA-C, 5 mg at 06/06/21 0822     busPIRone (BUSPAR) tablet 30 mg, 30 mg, Oral, BID, Hiniker, Mariana A, PA-C, 30 mg at 06/06/21 0822     calcium carbonate (TUMS) chewable tablet 1,000 mg, 1,000 mg, Oral, 4x Daily PRN, Hiniker, Mariana A, PA-C     calcium polycarbophil (FIBERCON) tablet 625 mg, 625 mg, Oral, Daily, Hiniker, Mariana A, PA-C, 625 mg at 06/06/21 0822     carboxymethylcellulose PF (REFRESH PLUS) 0.5 % ophthalmic solution 1 drop, 1 drop, Both Eyes, Q1H PRN, Hiniker, Mariana A, PA-C     dexamethasone (DECADRON) tablet 2 mg, 2 mg, Oral, Daily, Hiniker, Mariana A, PA-C, 2 mg at 06/06/21 0822     doxepin (SINEquan) 10 MG/ML (HIGH CONC) solution 3 mg, 3 mg, Oral, At Bedtime, Hiniker, Mariana A, PA-C, 3 mg at 06/05/21 2116     enoxaparin ANTICOAGULANT (LOVENOX) injection 40 mg, 40 mg, Subcutaneous, Q24H, Hiniker, Mariana A, PA-C, 40 mg at 06/05/21 2116     FLUoxetine (PROzac) capsule 60 mg, 60 mg, Oral, Daily, Hiniker, Mariana A, PA-C, 60 mg at 06/06/21 0822     furosemide (LASIX) tablet 40 mg, 40 mg, Oral, Daily, Hiniker, Mariana A, PA-C, 40 mg at 06/06/21 0822     levETIRAcetam (KEPPRA) tablet 1,250 mg, 1,250 mg, Oral, BID, Hiniker, Mariana A, PA-C, 1,250 mg at 06/06/21 0822     linaclotide (LINZESS) capsule 290 mcg, 290 mcg, Oral, QAM AC, Hiniker, Mariana A, PA-C, 290 mcg at 06/06/21 0639     Nurse may request from Pharmacy a change of form of medication (e.g. Liquid to tablet)., , Does not apply, Continuous PRN, Mariana Henriquez PA-C     ondansetron (ZOFRAN-ODT) ODT tab 4 mg, 4 mg, Oral, Q6H PRN, Mariana Henriquez, PA-C     pantoprazole (PROTONIX) EC tablet 40 mg, 40 mg, Oral, BID AC, Sabine Henriquezin ANA MARÍA, PA-C, 40 mg at 06/06/21 0639     polyethylene glycol (MIRALAX) Packet 17 g, 17 g, Oral, BID PRN, Mariana Henriquez PA-C     QUEtiapine (SEROquel) tablet 25 mg, 25 mg, Oral, BID, Sabine Henriquezin A, PA-C, 25 mg at 06/06/21 0822     QUEtiapine (SEROquel) tablet  50 mg, 50 mg, Oral, At Bedtime, Mariana Henriquez, PA-C, 50 mg at 06/05/21 2116     simethicone (MYLICON) chewable tablet 80 mg, 80 mg, Oral, Q6H PRN, Mariana Henriquez PA-VIRGIL     sulfamethoxazole-trimethoprim (BACTRIM) 400-80 MG per tablet 1 tablet, 1 tablet, Oral, Daily, Mariana Henriquez PA-C, 1 tablet at 06/06/21 0822     tuberculin injection 5 Units, 5 Units, Intradermal, Q21 Days, Mariana Henriquez PA-C

## 2021-06-06 NOTE — PROGRESS NOTES
21 1454   Living Arrangements   People in home alone   CM/SW Discharge Planning   Anticipated Discharge Disposition (CM/SW) Home;Home Care   Final Resources   Equipment Currently Used at Home cane, straight;walker, rolling     Social Work: Initial Assessment with Discharge Plan     Patient Name: Olga Bailey  : 1945  Age: 75 year old  MRN: [id number]  Completed assessment with: patient  Admitted to ARU: 21  [facility] 5/15-21 (11 SNF Medicare days)  [facility] 3/1-3/8/21     Presenting Information   Date of SW assessment: 21  Health Care Directive: copy in [employer]  Primary Health Care Agent: 3 children together (Sudheer, Dario & La Nena)  Secondary Health Care Agent: n/a  Living Situation: Single, lives alone in house with 3 kavita, flight to basement (does not need to do but where laundry is located). Walk-in shower. No pets.   Daughter-La Nena's house has 3 KAVITA from garage/2 KAVITA from front door, then 10 steps to 2nd level with bedroom, tub & walk in shower -or- patient could have office converted to bedroom.   Previous Functional Status: indep with ADLs/IADLs prior to March hospitalization   Patient and family understanding of hospitalization: yes.   Cultural/Language/Spiritual Considerations: speaks English, , Restorationist martin ( visits)   PAS: [id number]  BIMS: score of 15 (cognition intact)  PHQ-9: score of 7 (mild depression symptoms)     Physical Health  Reason for admission: 75 year old female with a history of glioblastoma s/p resection (21), hypertension, GERD, asthma, depression and anxiety. She was on [facility] from 3/1 to 3/8 then went home with outpatient PT/OT/SLP at [facility].  She developed bilateral pneumonia and was readmitted to the hospital on 3/26 where she remained until 5/15 when she transferred to  TCU.  She required re-admission to [facility] on  for seizure activity. Her Keppra dose was increased, steroids were started and she has since remained  seizure free. She now transfers back to  TCU for physical rehabilitation.     Provider Information   Primary Care Physician:Julissa Wilkes MBBS at [address] [address]  PH: [phone]     Mental Health/Chemical Dependency:   Diagnosis: depression/anxiety (prescribed seroquel, ativan, buspar and prozac)  Alcohol/Tobacco/Narcotics: rare alcohol use  Support/Services in Place: she has seen health psychologist during ARU visit     Support System  Marital Status: /single   Family support: daughter-La Nena (RN at [employer]), son-Dario (lives in [place]), son-Sudheer (lives in [place]); 4 grandchildren (2 teenagers & 2 younger ones).      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  Income Source: Pension and Social Security  Education: RN school  Financial Concerns: none  Insurance: Medicare/BCBS supplement     Discharge Plan   Patient and family discharge goal: stay with daughter at dtr's home with recommended home therapy or outpatient therapy   Patient agreeable to discharge plan: to be determined  Barriers to discharge: none identified       Discharge Recommendations   Disposition: stay with daughter at dtr's home with recommended home therapy or outpatient therapy   Transportation Needs: Family assistance   Name of Transportation Company and Phone: N/A      Additional comments   D:  See H&P for full medical background.  I:  Met with patient to complete assessment and social work consult.  A:  Patient is doing okay and has good support.  Supportive family who is involved.    P:  SW will follow and assist as needed.      DAYNA Wade  Weekend
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
## 2021-06-06 NOTE — PLAN OF CARE
New admit yesterday 06/5. Alert and oriented. Denied any pain during encounters overnight. Appeared to be sleeping well throughout the night. No SOB. Pure wick on overnight and with adequate output. No bm during the shift. Utilized the call light appropriately for assistance. Continue POC.       Patient's most recent vital signs are:     Vital signs:  BP: 124/74  Temp: 97.1  HR: 99  RR: 16  SpO2: 95 %     Patient does not have new respiratory symptoms.  Patient does not have new sore throat.  Patient does not have a fever greater than 99.5.

## 2021-06-06 NOTE — PROGRESS NOTES
"   06/06/21 0815   Quick Adds   Type of Visit Initial PT Evaluation   Living Environment   Current Living Arrangements house  (plan to dc to her daughters home (not 24hr) 2+8 steps )   Transportation Anticipated agency;family or friend will provide;other (see comments)  (pt used medical transport for chemo, dtr can also help)   Living Environment Comments pt plans to dc to her daughter's home with 2 steps (no rail) plus 8 steps (B rails) inside, pt's daughter will not be available 24 hour and she has 2 grandchildren (5 and 7 years old)   Self-Care   Usual Activity Tolerance good   Current Activity Tolerance moderate   Equipment Currently Used at Home cane, straight;walker, rolling   Activity/Exercise/Self-Care Comment pt was previously not using a device, now uses a walker and also has a cane at home.    Disability/Function   Hearing Difficulty or Deaf no   Wear Glasses or Blind yes   Vision Management glasses   Concentrating, Remembering or Making Decisions Difficulty no   Difficulty Communicating no   Communication other (see comments)  (wordfinding)   Difficulty Eating/Swallowing no   Eating/Swallowing swallowing liquids   Walking or Climbing Stairs Difficulty no   Dressing/Bathing Difficulty yes   Dressing/Bathing bathing difficulty, assistance 1 person   Dressing/Bathing Management shower chair   Toileting issues yes   Toileting Management grab bars   Toileting Assistance toileting difficulty, assistance 1 person   Doing Errands Independently Difficulty (such as shopping) yes   Errands Management family helped   Fall history within last six months no   Number of times patient has fallen within last six months 0   Change in Functional Status Since Onset of Current Illness/Injury yes   General Information   Pertinent History of Current Problem (include personal factors and/or comorbidities that impact the POC) \"74 y/o female admitted on 5/26/2021 for breakthrough seizures secondary to GBM.  Pt had previously been " "hospitalized 5/11/21 and had seizures at that time.  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  Other PMH includes anxiety/depression, b/l LE DVT 3/29/21, retroperitoneal bleed 4/14/21, HTN.  Pt has her own house and was living independently until the medical issues of this year.  Most recently she has been living with her daughter but does not have 24/7 assist.  Pt was previously on this unit in May of this year.\"  per chart review and MD notes.    Existing Precautions/Restrictions Fall;swallowing, seizure   Weight-Bearing Status - LUE weight-bearing as tolerated   Weight-Bearing Status - RUE weight-bearing as tolerated   Weight-Bearing Status - LLE weight-bearing as tolerated   Weight-Bearing Status - RLE weight-bearing as tolerated   General Observations pt is comfortable in bed and agreeable to PT history, exam, and mobility   Cognition   Orientation Status (Cognition) other (see comments)   Cognitive Status Comments alert and oriented for PT purposes, some wordfinding noted   Integumentary/Edema   Integumentary/Edema Comments none   Posture    Posture Comments WFL's age appropriate   Range of Motion (ROM)   ROM Comment WFL's for gait and transfers   Strength   Strength Comments LE's grossly 3/5 proximally and 3+ to 4-/5 distally   ARC Assessment Only   Acute Rehab Functional Assessment See IP Rehab Daily Documentation Flowsheet for Functional Mobility/ADL Assessment   Sensory Examination   Sensory Perception Comments PT: intact to light touch localization B UE and LE   Coordination   Coordination Comments PT:  gross coordination is intact functionally   Muscle Tone   Muscle Tone Comments 1-2 beats of clonus R LE/foot; none L LE    Clinical Impression   Criteria for Skilled Therapeutic Intervention yes, treatment indicated   PT Diagnosis (PT) Pt demonstrates impaired functional tolerance, strength, balance and motor control secondary to GBM, complicated by additional medical issues as " noted including respiratory involvment which continues to affect tolerance.  She is apporpriate for skilled PT to help her return to her prior level of function (goal is first home with daughter's intermittent assist, then home mod I as able).    Influenced by the following impairments as in PT dx   Functional limitations due to impairments as in PT dx   Clinical Presentation Evolving/Changing   Clinical Presentation Rationale neuro involvement, medical complexity; functional status   Clinical Decision Making (Complexity) moderate complexity   Therapy Frequency (PT) 6 days a week   Predicted Duration of Therapy Intervention (days/wks) 14-21 days   Planned Therapy Interventions (PT) balance training;bed mobility training;gait training;home exercise program;neuromuscular re-education;patient/family education;postural re-education;ROM (range of motion);stair training;strengthening;stretching;transfer training;wheelchair management/propulsion training   Anticipated Equipment Needs at Discharge (PT) walker, rolling   Risk & Benefits of therapy have been explained evaluation/treatment results reviewed;care plan/treatment goals reviewed;risks/benefits reviewed;current/potential barriers reviewed;participants voiced agreement with care plan;participants included;patient   Clinical Impression Comments Pt presents with chronic medical issues including neuro involvement and changing functional status; she is approrpriate for skilled PT due to this medical complexity and functional presentation to help her return home with family assist as needed.    PT Discharge Planning    PT Discharge Recommendation (DC Rec) home with home care physical therapy   PT Rationale for DC Rec PT: prolonged hospitalization, medical complexity, functional status   PT Brief overview of current status  As above, requires A x 1 for mobility at this time; desaturates to the mid 80's (O2 sat) with short gait distances.     Therapy Certification   Start  of care date 06/06/21   Certification date from 06/06/21   Certification date to 07/06/21   Medical Diagnosis Glioblastoma multiform   Total Evaluation Time   Total Evaluation Time (Minutes) 30   Coping Strategies   Trust Relationship/Rapport care explained;choices provided;questions answered;questions encouraged       Luís Carter, PT

## 2021-06-06 NOTE — PLAN OF CARE
Discharge Planner Post-Acute Rehab PT:     Discharge Plan: home with daughter's assist, not 24 hour is pt's goal    Precautions: fall, hx of recent seizures, swallowing    Current Status:  Bed Mobility: cga to min A, Mod assist to scoot hips  Transfer: cga to min A stand pivot with FWW  Gait: 50-60 feet with FWW cga and w/c follow for safety  Stairs: TBA  Balance: cga to occasional min A when fatigued.    Assessment:  Pt is appropriate for skilled PT to help her return home with family assist; this is complicated by a prolonged hospitalization, medical complexity and neuro involvement.  She is appropriate for skilled PT to help her reach her goals.      Other Barriers to Discharge (DME, Family Training, etc): functional tolerance, medical complexity, functional status, neuro involvement

## 2021-06-07 ENCOUNTER — APPOINTMENT (OUTPATIENT)
Dept: PHYSICAL THERAPY | Facility: SKILLED NURSING FACILITY | Age: 76
End: 2021-06-07
Payer: COMMERCIAL

## 2021-06-07 ENCOUNTER — APPOINTMENT (OUTPATIENT)
Dept: OCCUPATIONAL THERAPY | Facility: SKILLED NURSING FACILITY | Age: 76
End: 2021-06-07
Payer: COMMERCIAL

## 2021-06-07 LAB
ANION GAP SERPL CALCULATED.3IONS-SCNC: 6 MMOL/L (ref 3–14)
BUN SERPL-MCNC: 21 MG/DL (ref 7–30)
CALCIUM SERPL-MCNC: 9.1 MG/DL (ref 8.5–10.1)
CHLORIDE SERPL-SCNC: 104 MMOL/L (ref 94–109)
CO2 SERPL-SCNC: 28 MMOL/L (ref 20–32)
CREAT SERPL-MCNC: 0.54 MG/DL (ref 0.52–1.04)
ERYTHROCYTE [DISTWIDTH] IN BLOOD BY AUTOMATED COUNT: 16.4 % (ref 10–15)
GFR SERPL CREATININE-BSD FRML MDRD: >90 ML/MIN/{1.73_M2}
GLUCOSE SERPL-MCNC: 86 MG/DL (ref 70–99)
HCT VFR BLD AUTO: 34.9 % (ref 35–47)
HGB BLD-MCNC: 11.4 G/DL (ref 11.7–15.7)
MCH RBC QN AUTO: 30.8 PG (ref 26.5–33)
MCHC RBC AUTO-ENTMCNC: 32.7 G/DL (ref 31.5–36.5)
MCV RBC AUTO: 94 FL (ref 78–100)
PLATELET # BLD AUTO: 210 10E9/L (ref 150–450)
POTASSIUM SERPL-SCNC: 3.6 MMOL/L (ref 3.4–5.3)
RBC # BLD AUTO: 3.7 10E12/L (ref 3.8–5.2)
SODIUM SERPL-SCNC: 138 MMOL/L (ref 133–144)
WBC # BLD AUTO: 8.9 10E9/L (ref 4–11)

## 2021-06-07 PROCEDURE — 120N000009 HC R&B SNF

## 2021-06-07 PROCEDURE — 99207 PR NO CHARGE LOS: CPT | Performed by: PHYSICIAN ASSISTANT

## 2021-06-07 PROCEDURE — 97530 THERAPEUTIC ACTIVITIES: CPT | Mod: GP

## 2021-06-07 PROCEDURE — 80048 BASIC METABOLIC PNL TOTAL CA: CPT | Performed by: PHYSICIAN ASSISTANT

## 2021-06-07 PROCEDURE — 250N000011 HC RX IP 250 OP 636: Performed by: PHYSICIAN ASSISTANT

## 2021-06-07 PROCEDURE — 97535 SELF CARE MNGMENT TRAINING: CPT | Mod: GO

## 2021-06-07 PROCEDURE — 36415 COLL VENOUS BLD VENIPUNCTURE: CPT | Performed by: PHYSICIAN ASSISTANT

## 2021-06-07 PROCEDURE — 250N000012 HC RX MED GY IP 250 OP 636 PS 637: Performed by: PHYSICIAN ASSISTANT

## 2021-06-07 PROCEDURE — 97535 SELF CARE MNGMENT TRAINING: CPT | Mod: GO | Performed by: OCCUPATIONAL THERAPIST

## 2021-06-07 PROCEDURE — 250N000013 HC RX MED GY IP 250 OP 250 PS 637: Performed by: PHYSICIAN ASSISTANT

## 2021-06-07 PROCEDURE — 97116 GAIT TRAINING THERAPY: CPT | Mod: GP

## 2021-06-07 PROCEDURE — 85027 COMPLETE CBC AUTOMATED: CPT | Performed by: PHYSICIAN ASSISTANT

## 2021-06-07 RX ADMIN — LEVETIRACETAM 1250 MG: 750 TABLET, FILM COATED ORAL at 21:07

## 2021-06-07 RX ADMIN — FLUOXETINE HYDROCHLORIDE 60 MG: 20 CAPSULE ORAL at 08:41

## 2021-06-07 RX ADMIN — PANTOPRAZOLE SODIUM 40 MG: 40 TABLET, DELAYED RELEASE ORAL at 16:27

## 2021-06-07 RX ADMIN — DEXAMETHASONE 2 MG: 1 TABLET ORAL at 08:41

## 2021-06-07 RX ADMIN — CALCIUM POLYCARBOPHIL 625 MG: 625 TABLET, FILM COATED ORAL at 08:42

## 2021-06-07 RX ADMIN — QUETIAPINE FUMARATE 25 MG: 25 TABLET ORAL at 21:08

## 2021-06-07 RX ADMIN — BUSPIRONE HYDROCHLORIDE 30 MG: 15 TABLET ORAL at 21:08

## 2021-06-07 RX ADMIN — BUSPIRONE HYDROCHLORIDE 30 MG: 15 TABLET ORAL at 08:42

## 2021-06-07 RX ADMIN — LEVETIRACETAM 1250 MG: 750 TABLET, FILM COATED ORAL at 08:42

## 2021-06-07 RX ADMIN — FUROSEMIDE 40 MG: 40 TABLET ORAL at 08:43

## 2021-06-07 RX ADMIN — PANTOPRAZOLE SODIUM 40 MG: 40 TABLET, DELAYED RELEASE ORAL at 06:34

## 2021-06-07 RX ADMIN — SULFAMETHOXAZOLE AND TRIMETHOPRIM 1 TABLET: 400; 80 TABLET ORAL at 08:42

## 2021-06-07 RX ADMIN — LINACLOTIDE 290 MCG: 145 CAPSULE, GELATIN COATED ORAL at 06:34

## 2021-06-07 RX ADMIN — QUETIAPINE FUMARATE 25 MG: 25 TABLET ORAL at 08:42

## 2021-06-07 RX ADMIN — DOXEPIN HYDROCHLORIDE 3 MG: 10 SOLUTION ORAL at 21:09

## 2021-06-07 RX ADMIN — ENOXAPARIN SODIUM 40 MG: 40 INJECTION SUBCUTANEOUS at 21:07

## 2021-06-07 RX ADMIN — AMLODIPINE BESYLATE 5 MG: 5 TABLET ORAL at 08:42

## 2021-06-07 RX ADMIN — QUETIAPINE FUMARATE 50 MG: 25 TABLET ORAL at 21:08

## 2021-06-07 NOTE — H&P
H&P by Mirian Bueno MD was reviewed.     Mariana Henriquez PA-C  Hospitalist Service  Pager [phone]

## 2021-06-07 NOTE — PROGRESS NOTES
SPIRITUAL HEALTH SERVICES  SPIRITUAL ASSESSMENT Progress Note  [facility] ([facility] [place] TCU [id number]      REFERRAL SOURCE: Follow Up    Pt mentioned she did not want to receive SHS and have visits with unit .    PLAN: Pt will have to specifically request visit with  if needed.    Giovanna Drew  Associate    Pager: [phone]

## 2021-06-07 NOTE — PHARMACY-TCU REVIEW OF H&P
I have reviewed this patient's TCU admission History & Physical for medication related changes/recommendations identified by the admitting provider.  I am confirming that there are no recommendations requiring changes to medication orders  at this time based on the provider recommendations in the H&P.  Virginia Simon PharmD, [facility] June 7, 2021

## 2021-06-07 NOTE — PLAN OF CARE
Discharge Planner Post-Acute Rehab PT:     Discharge Plan: home with daughter's assist, not 24 hour is pt's goal    Precautions: fall, hx of recent seizures, swallowing    Current Status:  Bed Mobility: cga to min A, Mod assist to scoot hips  Transfer: cga to min A stand pivot with FWW  Gait: 130 feet with FWW cga and w/c follow for safety  Stairs: TBA  Balance: cga to occasional min A when fatigued.    Assessment: O2 decreased to 88-89% on RA with activity. Cues for breathing technique, pt able to recover <1 min to >90% on RA. Pt amb 130 ft x1 using ww CGA/min, close w/c follow.     Other Barriers to Discharge (DME, Family Training, etc): functional tolerance, medical complexity, functional status, neuro involvement

## 2021-06-07 NOTE — PROGRESS NOTES
"CLINICAL NUTRITION SERVICES - ASSESSMENT NOTE     Nutrition Prescription    RECOMMENDATIONS FOR MDs/PROVIDERS TO ORDER:  Consult SLP for continued assessment of diet advancement - of note, pt would like to try sandwiches and reno crackers     Malnutrition Status:    Patient does not meet two of the established criteria necessary for diagnosing malnutrition    Recommendations already ordered by Registered Dietitian (RD):  Discontinue Magic Cup  Magic Shake (no chocolate) at PM snack    Future/Additional Recommendations:  If intakes decline, increase frequency of Magic Shake      REASON FOR ASSESSMENT  Olga Bailey is a/an 75 year old female assessed by the dietitian for Provider Order - Nutrition Education - malnutrition    NUTRITION HISTORY  - Pt known to nutrition services. Reports she has been eating well and has a good appetite, however does not particularly care for the DD2 menu with thickened liquids. She does enjoy Magic Shakes and likes drinking 1 daily.   - SLP previously following, most recently on 5/15, recommending nectar fluids, NDD2 diet.    CURRENT NUTRITION ORDERS  Diet: Dysphagia Level 2: Mechanically Altered and Nectar Thickened Liquids, RSA, Magic Cup TID with meals    Intake/Tolerance: % of meals per flowsheet. On average, pt is ordering 1100 kcal and 75 g protein daily per [employer]. She does not care for Magic Cups but does enjoy eating 1 Magic Shake daily. Food alone is likely meeting 77% minimum energy and 100% protein needs (will increase with Magic Shake). Pt has a good appetite per RN notes.     LABS  Labs reviewed    MEDICATIONS  Medications reviewed  - Fibercon qd  - Lasix qd    ANTHROPOMETRICS  Ht Readings from Last 1 Encounters:   05/26/21 1.6 m (5' 3\")   Most Recent Weight: 72.6 kg (160 lb)  IBW: 52.3 kg (139% IBW)  BMI: Overweight BMI 25-29.9  Weight History: Wt appears fairly stable over the last ~5 months, fluctuating between ~150-160 lbs.   Wt Readings from Last 10 " Encounters:   06/05/21 72.6 kg (160 lb)   06/05/21 73.7 kg (162 lb 7.7 oz)   05/19/21 69.9 kg (154 lb)   05/10/21 67.1 kg (147 lb 14.9 oz)   04/26/21 74.4 kg (164 lb 0.4 oz)   03/23/21 70 kg (154 lb 6.4 oz)   03/08/21 69.9 kg (154 lb)   02/26/21 74.4 kg (164 lb 1.6 oz)   11/20/15 68 kg (150 lb)   01/22/08 69.9 kg (154 lb)     Dosing Weight: 57 kg - adjusted from admit recent wt    ASSESSED NUTRITION NEEDS  Estimated Energy Needs: 3733-7662 kcals/day (25 - 30 kcals/kg)  Justification: Maintenance   Estimated Protein Needs: 57-68 grams protein/day (1 - 1.2 grams of pro/kg)  Justification: Increased needs  Estimated Fluid Needs: 1 mL/kcal  Justification: Per provider pending fluid status    PHYSICAL FINDINGS  See malnutrition section below.    MALNUTRITION  % Intake: Decreased intake does not meet criteria  % Weight Loss: None noted  Subcutaneous Fat Loss: None observed  Muscle Loss: Temporal, Upper arm (bicep, tricep) and Dorsal hand: mild  Fluid Accumulation/Edema: None noted  Malnutrition Diagnosis: Patient does not meet two of the established criteria necessary for diagnosing malnutrition    NUTRITION DIAGNOSIS  Predicted inadequate protein-energy intake related to variable appetite as evidenced by pt reliant on PO intakes to meet 100% of nutritional needs with potential for variation       INTERVENTIONS  Implementation  Discussed nutrition history and PO since admission. Discussed menu ordering and snacks available on the unit. Pt is finding foods she enjoys and feels she will get enough to eat during this admission. Discussed oral nutrition supplements and they would like Magic Shake daily, not Magic Cup. Denied any questions or concerns at this time.      Goals  Patient to consume % of nutritionally adequate meal trays TID, or the equivalent with supplements/snacks.     Monitoring/Evaluation  Progress toward goals will be monitored and evaluated per protocol.      Alicia Francis RD, LD  TCU/8A Pager  (M-F): [phone]  Weekend Pager (-[place]): [phone]

## 2021-06-07 NOTE — PLAN OF CARE
Physical Therapy Discharge Summary    Reason for therapy discharge:    Discharged to transitional care facility.    Progress towards therapy goal(s). See goals on Care Plan in [facility] electronic health record for goal details.  Goals partially met.  Barriers to achieving goals:   discharge from facility.    Therapy recommendation(s):    Continued therapy is recommended.  Rationale/Recommendations:  Patient significantly below baseline function and requires assist x1-2 for functional mobility. Pt would benefit from ongoing skilled physical therapy to progress to PLOF .

## 2021-06-07 NOTE — PLAN OF CARE
Pt. is alert and oriented, can communicate needs effectively. Assist of 1 with transfers and cares. Incontinent of both bowel and bladder. Purewick in place. LBM today day shift.  VSS. Denies pain, SOB, headache and nausea. On a DD2 diet with nectar thick fluids. Appetite good, encoraged fluids. Skin intact ex. bruising RLE and abdomen. On a seizure precaution. No acute issues or concern at this time. Will continue with POC.       Patient's most recent vital signs are:     Vital signs:  BP: 120/69  Temp: 97.8  HR: 85  RR: 16  SpO2: 95 %     Patient does not have new respiratory symptoms.  Patient does not have new sore throat.  Patient does not have a fever greater than 99.5.

## 2021-06-07 NOTE — PLAN OF CARE
Patient alert and able to make needs known, taking pills whole with nectar thick liquids. Denies pain and SOB, pure wick in use and no BM this shift, continue plan of care.      Patient's most recent vital signs are:     Vital signs:  BP: 128/70  Temp: 97.2  HR: 66  RR: 16  SpO2: 93 %     Patientdoes not have new respiratory symptoms.  Patient does not have new sore throat.  Patient does not have a fever greater than 99.5.

## 2021-06-07 NOTE — PLAN OF CARE
Discharge Planner Post-Acute Rehab OT:     Discharge Plan: Pt reports plan remains to return to her daughter's home with intermittent supervision.  Pt also anticipates continued assistance with finances, medication management, meal prep, general IADL, and bathing.  Pt would need to be independent and safe with toileting and light meal prep.    Precautions: falls    Current Status:  ADLs: CGA-Mod A sit <> stand with FWW, CGA-Min A ambulation with FWW and w/c follow d/t fatigue, Mod A LB dressing from EOB, Min A UB dressing, Min-Mod A bed mobility  IADLs: NT  Vision/Cognition: Pt oriented with short term memory deficits present.  Pt reports some difficulty reading as well    Assessment: Tx focus on LB dressing and functional mobility with FWW to maximize IND and safety with BADLs and transfers. Pt required cuing for anterior lean when completing sit > stands using FWW; pt with increased ease and demod carryover as sesssion went on. Pt noted to be fatigued during ambulation requiring frequent rest breaks and shorter distances tolerated versus PT session this AM.     Pt limited by fatigue, poor activity tolerance, possible cognitive changes and currently requires assistance with all BADL.  Pt stated goal of discharge to daughter's home with intermittent assistance.  Pt will benefit from skilled OT to address deficits, teach compensatory strategies, provide family training as needed, and assist with disposition planning.    Other Barriers to Discharge (DME, Family Training, etc): Safety, family training

## 2021-06-07 NOTE — PLAN OF CARE
Patient's most recent vital signs are:     Vital signs:  BP: 112/74  Temp: 98.2  HR: 95  RR: 16  SpO2: 95 %     Patient does not have new respiratory symptoms.  Patient does not have new sore throat.  Patient does not have a fever greater than 99.5.    Up in chair much of the day.DD2 with nectar thick liquids. Takes meds whole without problem. Up with assist of two to use commode. Weak and deconditioned. Participating in therapies. Alert and correctly oriented and verbalizes her needs. Hearing and vision adequate. Fall precautions. Skin intact. Pleasant.

## 2021-06-08 ENCOUNTER — APPOINTMENT (OUTPATIENT)
Dept: OCCUPATIONAL THERAPY | Facility: SKILLED NURSING FACILITY | Age: 76
End: 2021-06-08
Payer: COMMERCIAL

## 2021-06-08 ENCOUNTER — APPOINTMENT (OUTPATIENT)
Dept: PHYSICAL THERAPY | Facility: SKILLED NURSING FACILITY | Age: 76
End: 2021-06-08
Payer: COMMERCIAL

## 2021-06-08 ENCOUNTER — APPOINTMENT (OUTPATIENT)
Dept: SPEECH THERAPY | Facility: SKILLED NURSING FACILITY | Age: 76
End: 2021-06-08
Payer: COMMERCIAL

## 2021-06-08 LAB
CREAT SERPL-MCNC: 0.52 MG/DL (ref 0.52–1.04)
GFR SERPL CREATININE-BSD FRML MDRD: >90 ML/MIN/{1.73_M2}
PLATELET # BLD AUTO: 191 10E9/L (ref 150–450)

## 2021-06-08 PROCEDURE — 82565 ASSAY OF CREATININE: CPT | Performed by: PHYSICIAN ASSISTANT

## 2021-06-08 PROCEDURE — 85049 AUTOMATED PLATELET COUNT: CPT | Performed by: PHYSICIAN ASSISTANT

## 2021-06-08 PROCEDURE — 97110 THERAPEUTIC EXERCISES: CPT | Mod: GP

## 2021-06-08 PROCEDURE — 97116 GAIT TRAINING THERAPY: CPT | Mod: GP

## 2021-06-08 PROCEDURE — 250N000013 HC RX MED GY IP 250 OP 250 PS 637: Performed by: PHYSICIAN ASSISTANT

## 2021-06-08 PROCEDURE — 92610 EVALUATE SWALLOWING FUNCTION: CPT | Mod: GN

## 2021-06-08 PROCEDURE — 36415 COLL VENOUS BLD VENIPUNCTURE: CPT | Performed by: PHYSICIAN ASSISTANT

## 2021-06-08 PROCEDURE — 97535 SELF CARE MNGMENT TRAINING: CPT | Mod: GO

## 2021-06-08 PROCEDURE — 250N000012 HC RX MED GY IP 250 OP 636 PS 637: Performed by: PHYSICIAN ASSISTANT

## 2021-06-08 PROCEDURE — 120N000009 HC R&B SNF

## 2021-06-08 PROCEDURE — 97530 THERAPEUTIC ACTIVITIES: CPT | Mod: GP

## 2021-06-08 PROCEDURE — 250N000011 HC RX IP 250 OP 636: Performed by: PHYSICIAN ASSISTANT

## 2021-06-08 RX ADMIN — CALCIUM POLYCARBOPHIL 625 MG: 625 TABLET, FILM COATED ORAL at 08:38

## 2021-06-08 RX ADMIN — BUSPIRONE HYDROCHLORIDE 30 MG: 15 TABLET ORAL at 08:37

## 2021-06-08 RX ADMIN — PANTOPRAZOLE SODIUM 40 MG: 40 TABLET, DELAYED RELEASE ORAL at 16:54

## 2021-06-08 RX ADMIN — PANTOPRAZOLE SODIUM 40 MG: 40 TABLET, DELAYED RELEASE ORAL at 06:49

## 2021-06-08 RX ADMIN — FUROSEMIDE 40 MG: 40 TABLET ORAL at 08:38

## 2021-06-08 RX ADMIN — QUETIAPINE FUMARATE 25 MG: 25 TABLET ORAL at 08:37

## 2021-06-08 RX ADMIN — LINACLOTIDE 290 MCG: 145 CAPSULE, GELATIN COATED ORAL at 06:49

## 2021-06-08 RX ADMIN — AMLODIPINE BESYLATE 5 MG: 5 TABLET ORAL at 08:37

## 2021-06-08 RX ADMIN — ENOXAPARIN SODIUM 40 MG: 40 INJECTION SUBCUTANEOUS at 21:44

## 2021-06-08 RX ADMIN — LEVETIRACETAM 1250 MG: 750 TABLET, FILM COATED ORAL at 21:38

## 2021-06-08 RX ADMIN — LEVETIRACETAM 1250 MG: 750 TABLET, FILM COATED ORAL at 08:37

## 2021-06-08 RX ADMIN — QUETIAPINE FUMARATE 50 MG: 25 TABLET ORAL at 21:39

## 2021-06-08 RX ADMIN — BUSPIRONE HYDROCHLORIDE 30 MG: 15 TABLET ORAL at 21:38

## 2021-06-08 RX ADMIN — FLUOXETINE HYDROCHLORIDE 60 MG: 20 CAPSULE ORAL at 08:37

## 2021-06-08 RX ADMIN — QUETIAPINE FUMARATE 25 MG: 25 TABLET ORAL at 21:38

## 2021-06-08 RX ADMIN — DEXAMETHASONE 2 MG: 1 TABLET ORAL at 08:38

## 2021-06-08 RX ADMIN — DOXEPIN HYDROCHLORIDE 3 MG: 10 SOLUTION ORAL at 21:43

## 2021-06-08 RX ADMIN — SULFAMETHOXAZOLE AND TRIMETHOPRIM 1 TABLET: 400; 80 TABLET ORAL at 08:38

## 2021-06-08 NOTE — PLAN OF CARE
Discharge Planner Post-Acute Rehab OT:     Discharge Plan: Pt reports plan remains to return to her daughter's home with intermittent supervision.  Pt also anticipates continued assistance with finances, medication management, meal prep, general IADL, and bathing.  Pt would need to be independent and safe with toileting and light meal prep.    Precautions: falls    Current Status:  ADLs: CGA-Mod A sit <> stand with FWW, CGA-Min A ambulation with FWW and w/c follow d/t fatigue, Mod A LB dressing from EOB, Min A UB dressing, Min-Mod A bed mobility  IADLs: NT  Vision/Cognition: Pt oriented with short term memory deficits present.  Pt reports some difficulty reading as well    Assessment: Tx focus on toileting assessment to maximize ease, safety, and IND with transfer and clothing management. Rearranged BR DME to increase safety and ease. Pt required assist for doffing LB dressing d/t fatigue once at toilet. Pt able to wipe for first time since being here from seated. Provided pt with commode on right side of toilet to use to push up for lifting assist. Pt used L grab bar also. Assisted pt with threading through BLEs, then pt able to pull up brief and shorts with CGA using brodie UEs. Pt stood EOS for hand hygiene using forearms for support d/t fatigue. Pt able to ambulate to/from toilet from EOB with FWW and CGA/Min A d/t R side lean. Pt took seated rest break for several minutes then completed approx 20 min' hallway amb with FWW. Increased ease with STS at end of session. Good carryover of techniques. Pt very pleased with session/progress made.    Other Barriers to Discharge (DME, Family Training, etc): Safety, family training

## 2021-06-08 NOTE — PLAN OF CARE
Discharge Planner Post-Acute Rehab SLP:     Discharge Plan: [facility]    Precautions: Dysphagia    Current Status:  Communication: No difficulties with conversational tasks and able to follow directions without difficulties.  Cognition: Does not report changes in her cognition.  Swallow: Currently on NDD-2 diet with NTL. VFSS completed 5/26 showing silent aspiration. Anticipate repeat study within one week of current admission    Assessment: Dysphagia evaluation completed.  See progress note for further details.  Overall seems to be tolerating current NDD-2 texture diet and nectar thick liquids without overt signs symptoms or changes in vocal quality.  Did have mild throat clear x1 with nectar thick liquids.  Limited trials of ice chips and thin liquids tolerated without overt signs and symptoms of aspiration.  Discussed timing for repeat VFSS to be completed later this week or early next.    Other Barriers to Discharge (Family Training, etc): [facility]

## 2021-06-08 NOTE — PROGRESS NOTES
"   06/06/21 0815   Quick Adds   Type of Visit Initial PT Evaluation   Living Environment   Current Living Arrangements house  (plan to dc to her daughters home (not 24hr) 2+8 steps )   Transportation Anticipated agency;family or friend will provide;other (see comments)  (pt used medical transport for chemo, dtr can also help)   Living Environment Comments pt plans to dc to her daughter's home with 2 steps (no rail) plus 8 steps (B rails) inside, pt's daughter will not be available 24 hour and she has 2 grandchildren (5 and 7 years old)   Self-Care   Usual Activity Tolerance good   Current Activity Tolerance moderate   Equipment Currently Used at Home cane, straight;walker, rolling   Activity/Exercise/Self-Care Comment pt was previously not using a device, now uses a walker and also has a cane at home.    Disability/Function   Hearing Difficulty or Deaf no   Wear Glasses or Blind yes   Vision Management glasses   Concentrating, Remembering or Making Decisions Difficulty no   Difficulty Communicating no   Communication other (see comments)  (wordfinding)   Difficulty Eating/Swallowing no   Eating/Swallowing swallowing liquids   Walking or Climbing Stairs Difficulty no   Dressing/Bathing Difficulty yes   Dressing/Bathing bathing difficulty, assistance 1 person   Dressing/Bathing Management shower chair   Toileting issues yes   Toileting Management grab bars   Toileting Assistance toileting difficulty, assistance 1 person   Doing Errands Independently Difficulty (such as shopping) yes   Errands Management family helped   Fall history within last six months no   Number of times patient has fallen within last six months 0   Change in Functional Status Since Onset of Current Illness/Injury yes   General Information   Pertinent History of Current Problem (include personal factors and/or comorbidities that impact the POC) \"76 y/o female admitted on 5/26/2021 for breakthrough seizures secondary to GBM.  Pt had previously been " "hospitalized 5/11/21 and had seizures at that time.  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  Other PMH includes anxiety/depression, b/l LE DVT 3/29/21, retroperitoneal bleed 4/14/21, HTN.  Pt has her own house and was living independently until the medical issues of this year.  Most recently she has been living with her daughter but does not have 24/7 assist.  Pt was previously on this unit in May of this year.\"  per chart review and MD notes.    Existing Precautions/Restrictions fall;seizures;swallowing   Weight-Bearing Status - LUE weight-bearing as tolerated   Weight-Bearing Status - RUE weight-bearing as tolerated   Weight-Bearing Status - LLE weight-bearing as tolerated   Weight-Bearing Status - RLE weight-bearing as tolerated   General Observations pt is comfortable in bed and agreeable to PT history, exam, and mobility   Cognition   Orientation Status (Cognition) other (see comments)   Cognitive Status Comments alert and oriented for PT purposes, some wordfinding noted   Integumentary/Edema   Integumentary/Edema Comments none   Posture    Posture Comments WFL's age appropriate   Range of Motion (ROM)   ROM Comment WFL's for gait and transfers   Strength   Strength Comments LE's grossly 3/5 proximally and 3+ to 4-/5 distally   ARC Assessment Only   Acute Rehab Functional Assessment See IP Rehab Daily Documentation Flowsheet for Functional Mobility/ADL Assessment   Sensory Examination   Sensory Perception Comments PT: intact to light touch localization B UE and LE   Coordination   Coordination Comments PT:  gross coordination is intact functionally   Muscle Tone   Muscle Tone Comments 1-2 beats of clonus R LE/foot; none L LE    Clinical Impression   Criteria for Skilled Therapeutic Intervention yes, treatment indicated   PT Diagnosis (PT) Pt demonstrates impaired functional tolerance, strength, balance and motor control secondary to GBM, complicated by additional medical issues as " noted including respiratory involvment which continues to affect tolerance.  She is apporpriate for skilled PT to help her return to her prior level of function (goal is first home with daughter's intermittent assist, then home mod I as able).    Influenced by the following impairments as in PT dx   Functional limitations due to impairments as in PT dx   Clinical Presentation Evolving/Changing   Clinical Presentation Rationale neuro involvement, medical complexity; functional status   Clinical Decision Making (Complexity) moderate complexity   Therapy Frequency (PT) 6x/week   Predicted Duration of Therapy Intervention (days/wks) 14-21 days   Planned Therapy Interventions (PT) balance training;bed mobility training;gait training;home exercise program;neuromuscular re-education;patient/family education;postural re-education;ROM (range of motion);stair training;strengthening;stretching;transfer training;wheelchair management/propulsion training   Anticipated Equipment Needs at Discharge (PT) walker, rolling   Risk & Benefits of therapy have been explained evaluation/treatment results reviewed;care plan/treatment goals reviewed;risks/benefits reviewed;current/potential barriers reviewed;participants voiced agreement with care plan;participants included;patient   Clinical Impression Comments Pt presents with chronic medical issues including neuro involvement and changing functional status; she is approrpriate for skilled PT due to this medical complexity and functional presentation to help her return home with family assist as needed.    PT Discharge Planning    PT Discharge Recommendation (DC Rec) home with home care physical therapy   PT Rationale for DC Rec PT: prolonged hospitalization, medical complexity, functional status   PT Brief overview of current status  As above, requires A x 1 for mobility at this time; desaturates to the mid 80's (O2 sat) with short gait distances.     Therapy Certification   Start of care  date 06/06/21   Certification date from 06/06/21   Certification date to 07/06/21   Medical Diagnosis Glioblastoma multiform   Total Evaluation Time   Total Evaluation Time (Minutes) 30   Coping Strategies   Trust Relationship/Rapport care explained;choices provided;questions answered;questions encouraged   Eval completed 6/6/21 by Luís Carter

## 2021-06-08 NOTE — PROGRESS NOTES
Pt well known to me from prior TCU admission.    SW provided printout of initial baseline care plan goals for pt to review. SW explained this is an overview and available to answer questions as able. If pt has further questions, SW can direct patient to the nurse, provider, therapy staff or interdisciplinary team member as needed.    SW informed pt of anticipated discharge date of 6/28. Pt's goals are to regain strength and live at daughter's home. Pt is highly motivated for this plan. SW and pt discussed the possibility of placement at a facility if the plan to go to dtr's home becomes unrealistic due to high supervision needs or ADL assistance needs.     SW reviewed role and capability of Health Psychologist to address pt's adjustment to new cancer diagnosis and isolation from family and friends (pt reports being very social PTA and is currently in quarantine on TCU). Pt accepting of referral. CAMRON wrote physician sticky note requesting consult and sent an In Basket message to Dr. Florence.    SW provided reflective listening and emotional support throughout our conversation. CAMRON will continue to remain available for patient and family support, discharge planning, other resources and support PRN.    Frederic SORTO, [facility]  TCU   Phone: [phone]

## 2021-06-08 NOTE — PROGRESS NOTES
06/08/21 1200   General Information   Onset of Illness/Injury or Date of Surgery 06/08/21   Referring Physician TERA Henriquez   Patient/Family Therapy Goal Statement (SLP) Advance diet   Pertinent History of Current Problem Olga Bailey is a 75 year old female with a history of glioblastoma s/p resection (2/23/21), HTN, GERD, asthma, depression, anxiety. She was initially admitted to [facility] from 4/26 to 5/15 for acute hypoxic respiratory failure 2/2 PJP pneumonia. She was discharged to  TCU but required re-admission to [facility] on 5/26 for seizure activity. Her Keppra dose was increased, steroids were started, and she has since remained seizure free. She now transfers back to TCU for physical rehabilitation.    General Observations Patient being seen by speech therapy prior to acute care hospitalization and seem to be close to repeating VFSS.  Over course of acute care hospitalization, was not seen by speech therapy.     Past History of Dysphagia Patient has been seen by speech therapy for dysphagia management over the course of the past 6 weeks   Type of Evaluation   Type of Evaluation Swallow Evaluation   Oral Motor   Oral Musculature generally intact   Structural Abnormalities none present   Dentition (Oral Motor)   Dentition (Oral Motor) natural dentition   Facial Symmetry (Oral Motor)   Facial Symmetry (Oral Motor) WNL   Lip Function (Oral Motor)   Lip Range of Motion (Oral Motor) WNL   Tongue Function (Oral Motor)   Tongue ROM (Oral Motor) WNL   Cough/Swallow/Gag Reflex (Oral Motor)   Volitional Throat Clear/Cough (Oral Motor) WNL   Volitional Swallow (Oral Motor) WNL   General Swallowing Observations   Current Diet/Method of Nutritional Intake (General Swallowing Observations, NIS) nectar-thick liquids;dysphagia level 2 (mechanically altered)   Swallowing Evaluation Clinical swallow evaluation   Clinical Swallow Evaluation   Feeding Assistance no assistance needed   Clinical Swallow Evaluation Textures  Trialed Thin Liquids;Nectar-Thick Liquids;Puree Textures;Semi-Solid;Solid Foods   Clinical Swallow Eval: Thin Liquid Texture Trial   Mode of Presentation, Thin Liquids cup;spoon;self-fed   Volume of Liquid or Food Presented Half to 1 teaspoon sized boluses   Oral Phase of Swallow WFL   Pharyngeal Phase of Swallow intact   Diagnostic Statement No overt signs and symptoms of aspiration or changes in vocal quality.  Patient does have a history of silent aspiration per prior VFSS on 5/21   Clinical Swallow Evaluation: Nectar-Thick Liquid Texture Trial   Mode of Presentation, Nectar cup;self-fed   Volume of Nectar Presented Single swallows   Oral Phase, Nectar WFL   Pharyngeal Phase, [place] intact   Diagnostic Statement No overt signs and symptoms of aspiration or changes in vocal quality.  Did have mild throat clear x1 otherwise no difficulties noted   Clinical Swallow Evaluation: Puree Solid Texture Trial   Mode of Presentation, Puree spoon;self-fed   Volume of Puree Presented Tablespoon sized boluses   Oral Phase, Puree WFL   Pharyngeal Phase, Puree intact   Diagnostic Statement Tolerated without any overt difficulties noted   Clinical Swallow Evaluation: Semisolid Texture Trial   Mode of Presentation, Semisolid spoon;self-fed   Volume of Semisolid Food Presented Tablespoon sized boluses   Oral Phase, Semisolid WFL   Pharyngeal Phase, Semisolid intact   Diagnostic Statement Tolerated softer textures without overt difficulties noted   Clinical Swallow Evaluation: Solid Food Texture Trial   Mode of Presentation, Solid self-fed   Volume of Solid Food Presented Teaspoon sized boluses   Oral Phase, Solid WFL   Pharyngeal Phase, Solid intact   Diagnostic Statement tolerated without difficulties noted.    Esophageal Phase of Swallow   Patient reports or presents with symptoms of esophageal dysphagia No   Swallowing Recommendations   Diet Consistency Recommendations nectar-thick liquids;dysphagia level 2 (mechanically  altered)   Supervision Level for Intake patient independent   Mode of Delivery Recommendations bolus size, small   Swallowing Maneuver Recommendations alternate food and liquid intake   Monitoring/Assistance Required (Eating/Swallowing) monitor for cough or change in vocal quality with intake   Recommended Feeding/Eating Techniques (Swallow Eval) patient is independent, no specific recommendations   SLP Therapy Assessment/Plan   Criteria for Skilled Therapeutic Interventions Met (SLP Eval) yes   SLP Diagnosis mild dysphagia   Rehab Potential (SLP Eval) good, to achieve stated therapy goals   Therapy Frequency (SLP Eval) 3 times/wk   Predicted Duration of Therapy Intervention (SLP Eval) 3 weeks   Activity Limitations Related to Problem List (SLP) altered diet   Comment, Therapy Assessment/Plan (SLP) Overall seems to be tolerating current NDD-2 texture diet and nectar thick liquids without overt signs symptoms or changes in vocal quality.  Did have mild throat clear x1 with nectar thick liquids.  Limited trials of ice chips and thin liquids tolerated without overt signs and symptoms of aspiration.  Discussed timing for repeat VFSS to be completed later this week or early next. Does show good potential for diet advancement   Therapy Plan Review/Discharge Plan (SLP)   Therapy Plan Review (SLP) evaluation/treatment results reviewed;care plan/treatment goals reviewed;participants included;patient   Therapy Certification   Start of Care Date 06/08/21   Certification date from 06/08/21   Certification date to 07/08/21   Medical Diagnosis glioblastoma    Total Evaluation Time   Total Evaluation Time (Minutes) 20

## 2021-06-08 NOTE — PROGRESS NOTES
06/08/21 1300   General Information   Patient Profile Review See Profile for full history and prior level of function   Daily Contact with Relatives or Friends Phone call   Community Involvement   Community Involvement Retired   Spiritual Practice Not connected/interested   Sees [facility] ? No   Hobbies/Interests   Cards Cribbage   Games Other (comments)  (linh)   Word Puzzles Word Search;Crossword   Craft/Art Needlework   Music   Listens to Recorded Music No   Media   Newspapers Daily   Reading Books   Reading Preferences History;Mystery   Sports / Physical Activities   Sports/Exercise Swim;Walks   Treatment Plan   Interested in Unit Gu[employer]? No   Assessment Therapeutic Recreation: Assessment completed, pt is a re admit from mid-May. Pt was re oriented to leisure materials available to her. Pt expressed interest in and was provided with newspaper and simple crosswords. Pt stated she has some crosswords but they are too difficult for her right now. Will provide check in for materials as needed.

## 2021-06-08 NOTE — PLAN OF CARE
Care Provided: 8973-4485    Temp: 98.2  F (36.8  C) Temp src: Axillary  BP: 143/80 Pulse: 118 Resp: (!) 38 SpO2: 95 % O2 Device: Nasal cannula Oxygen Delivery: 4 LPM     Neuro: A&Ox4, slight confusion to time noted in afternoon while on the IPad call with rad/onc ~1400. Endorses anxiety. Able to make needs known. PRN 0.5mg IV given x2 on shift for anxiety; pt appears more comfortable after administration evidenced by decrease in HR & RR.   Cardiac: SR 90s when comfortable, STach 110s with episodes of anxiety. BP stable. Denies chest pain.   Respiratory: RR 26-40, varies depending on anxiety level. Pt on NC 4L most of the shift, wore BiPAP 35% mid-morning for ~1.5 hours after IV ativan for anxiety. Relaxed at times on the BiPAP to RR 22-26 bpm. Endorses SOB at rest and JACOBSEN. See provider notification in afternoon about acute worsening of SOB and anxiety with increased fine crackles; improved with PRN IV ativan and 40mg lasix.  GI: Medium, loose BM on shift. Poor appetite with minimal PO intake on shift despite encouragement- ate 1 cup vanilla pudding, 100% cream of wheat cereal with brown sugar, 100% of a chocolate pudding, and half a boost shake. No interest in fluids; nectar level thickened.   : Adequate urine output; purewick intact. ~800cc out within 4h of 40mg IV lasix. Clear/carlin.   Activity: Lift assist. Repositioned frequently for pressure relief.   Pain: Denies pain, no PRNs given on shift related to pain.  Skin: Bruising noted abdomen & arms, trace +1 BLE edema, preventative mepilix on coccyx. No new deficits noted. Pt on pulsate mattress.     Lab:  Lactic 2.2 this AM with sepsis protocol, RRT called per protocol; no intervention or recheck lactic ordered. Phos 2.7 replaced with 9g NaPhos per RN protocol.      LDAs:    - PICC: TPN @ 35cc/hr, TKO  - Purewick     Shift Events: RRT in AM for lacic 2.2, no additional interventions. 40mg IV lasix given for SOB/fine crackles with good UOP, IPad call with  DISCHARGE SUMMARY      Patient ID:  Radha Santacruz  [id number]  11 y.o.  1966    Admit date: 6/2/2021    Discharge date and time: 6/8/2021    Admitting Physician: Giovanna Murillo MD     Discharge Physician: Dr Sis Hannah MD    Discharge Diagnoses:   Patient Active Problem List   Diagnosis    Drug overdose, multiple drugs    [facility] attempt [facility])    Laceration of neck    Laceration of left wrist    GERD (gastroesophageal reflux disease)    Bipolar 1 disorder ([address]    Opioid overdose ([address]    Drug-induced tremor    Orchitis    H/O carcinoma in situ of prostate    Essential hypertension    PND (post-nasal drip)    Scrotal abscess    Depression with suicidal ideation    Colitis    Acute Crohn's disease with rectal bleeding ([address]    Opiate abuse, episodic ([address]    LILLY (acute kidney injury) ([address]    Intra-abdominal abscess ([address]    Ileostomy in place ([address]    Crohn's disease of large intestine with abscess ([address]    Severe manic bipolar 1 disorder with psychotic behavior ([address]    Drug overdose, intentional self-harm, initial encounter ([address]    Mood disorder ([address]    Bipolar affective disorder, current episode manic without psychotic symptoms ([address]       Admission Condition: poor    Discharged Condition: stable    Admission Circumstance: Reported to ED for increase in hallucinations, aggressive behaviors and delusions.          PAST MEDICAL/PSYCHIATRIC HISTORY:   Past Medical History:   Diagnosis Date    ADD (attention deficit disorder)     Anxiety     Bipolar 1 disorder ([address] 11/19/2017    Cancer ([address] prostate cancer    2014 / treated with surgery    Crohn's disease ([address]     Depression     GERD (gastroesophageal reflux disease)     Panic attack     Rectal pain     has a fissure    Schizo affective schizophrenia ([address]     stable       FAMILY/SOCIAL HISTORY:  Family History   Problem Relation Age of Onset    Mental Illness Mother     Mental Illness Father     Mental Illness Sister    [facility]ness Brother     Heart Disease Mother     Depression Father      Social History     Socioeconomic History    Marital status:      Spouse name: Adair Crawford Number of children: 0    Years of education: 12 +8    Highest education level: Not on file   Occupational History    Occupation: [employer] [address]     Employer: UNEMPLOYED     Comment: SSDI   Tobacco Use    Smoking status: Former Smoker     Packs/day: 2.00     Years: 41.00     Pack years: 82.00     Types: Cigarettes     Start date: 1978     Quit date: 2021     Years since quittin.1    Smokeless tobacco: Never Used   Vaping Use    Vaping Use: Never used   Substance and Sexual Activity    Alcohol use: No     Comment: no alcohol in5 yrs    Drug use: No     Comment: last use  / marijuana    Sexual activity: Not Currently     Partners: Female   Other Topics Concern    Not on file   Social History Narrative    ** Merged History Encounter **          Social Determinants of Health     Financial Resource Strain:     Difficulty of Paying Living Expenses:    Food Insecurity:     Worried About Running Out of Food in the Last Year:     Ran Out of Food in the Last Year:    Transportation Needs:     Lack of Transportation (Medical):      Lack of Transportation (Non-Medical):    Physical Activity:     Days of Exercise per Week:     Minutes of Exercise per Session:    Stress:     Feeling of Stress :    Social Connections:     Frequency of Communication with Friends and Family:     Frequency of Social Gatherings with Friends and Family:     Attends Christian Services:     Active Member of Clubs or Organizations:     Attends Club or Organization Meetings:     Marital Status:    Intimate Partner Violence:     Fear of Current or Ex-Partner:     Emotionally Abused:     Physically Abused:     Sexually Abused:        MEDICATIONS:    Current Facility-Administered Medications:     nicotine polacrilex rad/onc @ 1400.     Plan: Will continue to monitor pt closely and notify primary team with any changes. Encourage BiPAP use at night and PRN.     Problem: Adult Inpatient Plan of Care  Goal: Plan of Care Review  Outcome: No Change     Problem: Adult Inpatient Plan of Care  Goal: Readiness for Transition of Care  Outcome: No Change     Problem: Fluid Imbalance (Pneumonia)  Goal: Fluid Balance  Outcome: No Change   (NICORETTE) gum 4 mg, 4 mg, Oral, PRN, Stevie Loya MD, 4 mg at 06/07/21 0917    acetaminophen (TYLENOL) tablet 650 mg, 650 mg, Oral, Q6H PRN, Stevie Loya MD, 650 mg at 06/08/21 0845    magnesium hydroxide (MILK OF MAGNESIA) 400 MG/5ML suspension 30 mL, 30 mL, Oral, Daily PRN, Stevie Loya MD    aluminum & magnesium hydroxide-simethicone (MAALOX) 200-200-20 MG/5ML suspension 30 mL, 30 mL, Oral, PRN, Stevie Loya MD    haloperidol (HALDOL) tablet 5 mg, 5 mg, Oral, Q6H PRN **OR** haloperidol lactate (HALDOL) injection 5 mg, 5 mg, Intramuscular, Q6H PRN, Stevie Loya MD    atorvastatin (LIPITOR) tablet 10 mg, 10 mg, Oral, Daily, KAM Corcoran - CNP, 10 mg at 06/08/21 0844    benztropine (COGENTIN) tablet 1 mg, 1 mg, Oral, BID, Maricruz Salgado APRKENDRA - CNP, 1 mg at 06/08/21 0845    divalproex (DEPAKOTE) DR tablet 500 mg, 500 mg, Oral, BID, Maricruz Salgado APRN - CNP, 500 mg at 06/08/21 0844    melatonin tablet 6 mg, 6 mg, Oral, Nightly, KAM Corcoran - CNP, 6 mg at 06/07/21 2042    prazosin (MINIPRESS) capsule 2 mg, 2 mg, Oral, BID, Maricruz Salgado APRN - CNP, 2 mg at 06/08/21 0844    paliperidone (INVEGA) extended release tablet 3 mg, 3 mg, Oral, Nightly, Maricruz Salgado APRN - CNP, 3 mg at 06/07/21 2042    paliperidone (INVEGA) extended release tablet 6 mg, 6 mg, Oral, Daily, Maricruz Salgado APRN - CNP, 6 mg at 06/08/21 0845    diphenhydrAMINE (BENADRYL) injection 50 mg, 50 mg, Intramuscular, Q6H PRN, KAM Corcoran CNP    LORazepam (ATIVAN) injection 2 mg, 2 mg, Intramuscular, Q6H PRN, KAM Corcoran CNP    hydrOXYzine (VISTARIL) capsule 50 mg, 50 mg, Oral, TID PRN, Stevie Loya MD, 50 mg at 06/08/21 0845    traZODone (DESYREL) tablet 50 mg, 50 mg, Oral, Nightly PRN, KAM Corcoran CNP, 50 mg at 06/07/21 2043    Examination:  /72   Pulse 74   Temp 98 °F (36.7 °C) (Oral)   Resp 16   Ht 6' 3.2\" (1.91 m)   Wt 200 lb (90.7 kg) [x] None(Psych. Meds.) [] Other      Mental Status Examination on discharge:    Level of consciousness:  within normal limits   Appearance:  well-appearing  Behavior/Motor:  no abnormalities noted  Attitude toward examiner:  attentive and good eye contact  Speech:  spontaneous, normal rate and normal volume   Mood: \" My mood is good. \"  Affect: Appropriate and pleasant  Thought processes: Linear without flight of ideas loose associations  Thought content: Devoid of any auditory visual hallucinations delusions or perceptual denies. Denies SI/HI intent or plan  Cognition:  oriented to person, place, and time   Concentration intact  Memory intact  Insight good   Judgement fair   Fund of Knowledge adequate      ASSESSMENT:  Patient symptoms are:  [x] Well controlled  [x] Improving  [] Worsening  [] No change    Reason for more than one antipsychotic:  [x] N/A  [] 3 Failed Monotherapy attempts (Drugs tried:)  [] Crossover to a new antipsychotic  [] Taper to Monotherapy from Polypharmacy  [] Augmentation of clozapine therapy due to treatment resistance to single therapy    Diagnosis:  Principal Problem:    Severe manic bipolar 1 disorder with psychotic behavior ([address]  Resolved Problems:    * No resolved hospital problems. *      LABS:    No results for input(s): WBC, HGB, PLT in the last 72 hours. No results for input(s): NA, K, CL, CO2, BUN, CREATININE, GLUCOSE in the last 72 hours. No results for input(s): BILITOT, ALKPHOS, AST, ALT in the last 72 hours.   Lab Results   Component Value Date    LABAMPH NOT DETECTED 06/02/2021    LABAMPH NOT DETECTED 02/03/2011    BARBSCNU NOT DETECTED 06/02/2021    LABBENZ NOT DETECTED 06/02/2021    LABBENZ SEE BELOW 12/08/2014    CANNAB NOT DETECTED  08/13/2012    COCAINESCRN NOT DETECTED 08/13/2012    LABMETH NOT DETECTED 06/02/2021    OPIATESCREENURINE NOT DETECTED 06/02/2021    PHENCYCLIDINESCREENURINE NOT DETECTED 06/02/2021    PPXUR NOT DETECTED 08/13/2012    ETOH <10 06/02/2021 Lab Results   Component Value Date    TSH 2.240 04/23/2021     Lab Results   Component Value Date    LITHIUM <0.10 (L) 10/24/2018     Lab Results   Component Value Date    VALPROATE 47 (L) 06/06/2021       RISK ASSESSMENT AT DISCHARGE: Low risk for suicide and homicide. Treatment Plan:  Reviewed current Medications with the patient. Education provided on the complaince with treatment. Risks, benefits, side effects, drug-to-drug interactions and alternatives to treatment were discussed. Encourage patient to attend outpatient follow up appointment and therapy. Patient was advised to call the outpatient provider, visit the nearest ED or call 911 if symptoms are not manageable. Patient's family member was contacted prior to the discharge. Medication List      START taking these medications    nicotine polacrilex 4 MG gum  Commonly known as: NICORETTE  Take 1 each by mouth as needed for Smoking cessation     * paliperidone 3 MG extended release tablet  Commonly known as: INVEGA  Take 1 tablet by mouth nightly     * paliperidone 6 MG extended release tablet  Commonly known as: INVEGA  Take 1 tablet by mouth daily  Start taking on: June 9, 2021         * This list has 2 medication(s) that are the same as other medications prescribed for you. Read the directions carefully, and ask your doctor or other care provider to review them with you.             CONTINUE taking these medications    divalproex 500 MG DR tablet  Commonly known as: DEPAKOTE  Take 1 tablet by mouth 2 times daily     melatonin 3 MG Tabs tablet     prazosin 2 MG capsule  Commonly known as: MINIPRESS        STOP taking these medications    haloperidol 5 MG tablet  Commonly known as: HALDOL     ondansetron 4 MG disintegrating tablet  Commonly known as: ZOFRAN-ODT     risperiDONE 2 MG tablet  Commonly known as: RISPERDAL        ASK your doctor about these medications    atorvastatin 10 MG tablet  Commonly known as: LIPITOR     * Deutetrabenazine 9 MG tablet  Commonly known as: AUSTEDO     * Austedo 6 MG tablet  Generic drug: deutetrabenazine     benztropine 1 MG tablet  Commonly known as: COGENTIN     ondansetron 4 MG tablet  Commonly known as: ZOFRAN  Take 1 tablet by mouth every 6 hours as needed for Nausea or Vomiting         * This list has 2 medication(s) that are the same as other medications prescribed for you. Read the directions carefully, and ask your doctor or other care provider to review them with you.                Where to Get Your Medications      These medications were sent to [facility] [address] [place] [address]    Phone: [phone]   · paliperidone 3 MG extended release tablet  · paliperidone 6 MG extended release tablet     Information about where to get these medications is not yet available    Ask your nurse or doctor about these medications  · nicotine polacrilex 4 MG gum       Patient is counseled mostly compliant with all medications outpatient follow-up appointments    TIME SPEND - 35 MINUTES TO COMPLETE THE EVALUATION, DISCHARGE SUMMARY, MEDICATION RECONCILIATION AND FOLLOW UP CARE     Signed:  KAM Castillo CNP  2/9/8245  11:16 AM

## 2021-06-08 NOTE — PLAN OF CARE
Patient appeared to be asleep through the night, has Pure Wick in use and no BM this shift. She denies pain and SOB. Call light is with in reach, continue to monitor.      Patient's most recent vital signs are:     Vital signs:  BP: 112/74  Temp: 98.2  HR: 95  RR: 16  SpO2: 95 %     Patientdoes not have new respiratory symptoms.  Patient does not have new sore throat.  Patient does not have a fever greater than 99.5.

## 2021-06-08 NOTE — PLAN OF CARE
Patient's most recent vital signs are:     Vital signs:  BP: 129/87  Temp: 97.3  HR: 97  RR: 20  SpO2: 96 %     Patient does not have new respiratory symptoms.  Patient does not have new sore throat.  Patient does not have a fever greater than 99.5.    Alert and verbally expresses her needs. Good appetite. Eats independently after tray set up. Participating in therapies. Gaining strength and endurance. No complaints of pain. Pleasant. Seizure and fall precautions.

## 2021-06-08 NOTE — PLAN OF CARE
VS: /74 (BP Location: Right arm)   Pulse 95   Temp 98.2  F (36.8  C) (Oral)   Resp 16   Wt 72.6 kg (160 lb)   SpO2 95%   BMI 28.34 kg/m     O2: RA    Output: Purewick on at night/ incontinent x 1 this shift    Last BM: 6/7   Activity: AO1-2/ with walker and GB    Skin: Intact    Pain: Denies    CMS: Intact, BUE tremors noted    Dressing: None    Diet: DD2 with Nectar thick liquids   LDA: None    Equipment: Walker, GB    Plan: Continue plan of care   Additional Info: PT is A&O, no new concerns or complaints. Call light within reach.          Patient's most recent vital signs are:     Vital signs:  BP: 112/74  Temp: 98.2  HR: 95  RR: 16  SpO2: 95 %     Patient does not have new respiratory symptoms.  Patient does not have new sore throat.  Patient does not have a fever greater than 99.5.

## 2021-06-09 ENCOUNTER — APPOINTMENT (OUTPATIENT)
Dept: PHYSICAL THERAPY | Facility: SKILLED NURSING FACILITY | Age: 76
End: 2021-06-09
Payer: COMMERCIAL

## 2021-06-09 ENCOUNTER — APPOINTMENT (OUTPATIENT)
Dept: OCCUPATIONAL THERAPY | Facility: SKILLED NURSING FACILITY | Age: 76
End: 2021-06-09
Payer: COMMERCIAL

## 2021-06-09 PROCEDURE — 97530 THERAPEUTIC ACTIVITIES: CPT | Mod: GP | Performed by: PHYSICAL THERAPIST

## 2021-06-09 PROCEDURE — 250N000012 HC RX MED GY IP 250 OP 636 PS 637: Performed by: PHYSICIAN ASSISTANT

## 2021-06-09 PROCEDURE — 97110 THERAPEUTIC EXERCISES: CPT | Mod: GO

## 2021-06-09 PROCEDURE — 97110 THERAPEUTIC EXERCISES: CPT | Mod: GP | Performed by: PHYSICAL THERAPIST

## 2021-06-09 PROCEDURE — 120N000009 HC R&B SNF

## 2021-06-09 PROCEDURE — 250N000013 HC RX MED GY IP 250 OP 250 PS 637: Performed by: PHYSICIAN ASSISTANT

## 2021-06-09 PROCEDURE — 97116 GAIT TRAINING THERAPY: CPT | Mod: GP | Performed by: PHYSICAL THERAPIST

## 2021-06-09 PROCEDURE — 250N000011 HC RX IP 250 OP 636: Performed by: PHYSICIAN ASSISTANT

## 2021-06-09 RX ADMIN — DEXAMETHASONE 2 MG: 1 TABLET ORAL at 08:40

## 2021-06-09 RX ADMIN — FUROSEMIDE 40 MG: 40 TABLET ORAL at 08:39

## 2021-06-09 RX ADMIN — CALCIUM POLYCARBOPHIL 625 MG: 625 TABLET, FILM COATED ORAL at 08:38

## 2021-06-09 RX ADMIN — LEVETIRACETAM 1250 MG: 750 TABLET, FILM COATED ORAL at 21:40

## 2021-06-09 RX ADMIN — PANTOPRAZOLE SODIUM 40 MG: 40 TABLET, DELAYED RELEASE ORAL at 17:25

## 2021-06-09 RX ADMIN — QUETIAPINE FUMARATE 50 MG: 25 TABLET ORAL at 21:40

## 2021-06-09 RX ADMIN — AMLODIPINE BESYLATE 5 MG: 5 TABLET ORAL at 08:40

## 2021-06-09 RX ADMIN — ENOXAPARIN SODIUM 40 MG: 40 INJECTION SUBCUTANEOUS at 21:42

## 2021-06-09 RX ADMIN — QUETIAPINE FUMARATE 25 MG: 25 TABLET ORAL at 08:40

## 2021-06-09 RX ADMIN — ACETAMINOPHEN 650 MG: 325 TABLET, FILM COATED ORAL at 17:25

## 2021-06-09 RX ADMIN — QUETIAPINE FUMARATE 25 MG: 25 TABLET ORAL at 21:39

## 2021-06-09 RX ADMIN — LINACLOTIDE 290 MCG: 145 CAPSULE, GELATIN COATED ORAL at 06:36

## 2021-06-09 RX ADMIN — LEVETIRACETAM 1250 MG: 750 TABLET, FILM COATED ORAL at 08:38

## 2021-06-09 RX ADMIN — PANTOPRAZOLE SODIUM 40 MG: 40 TABLET, DELAYED RELEASE ORAL at 06:36

## 2021-06-09 RX ADMIN — DOXEPIN HYDROCHLORIDE 3 MG: 10 SOLUTION ORAL at 21:42

## 2021-06-09 RX ADMIN — SULFAMETHOXAZOLE AND TRIMETHOPRIM 1 TABLET: 400; 80 TABLET ORAL at 08:39

## 2021-06-09 RX ADMIN — BUSPIRONE HYDROCHLORIDE 30 MG: 15 TABLET ORAL at 08:39

## 2021-06-09 RX ADMIN — FLUOXETINE HYDROCHLORIDE 60 MG: 20 CAPSULE ORAL at 08:39

## 2021-06-09 RX ADMIN — BUSPIRONE HYDROCHLORIDE 30 MG: 15 TABLET ORAL at 21:39

## 2021-06-09 NOTE — PLAN OF CARE
Patient up in the chair during meals.DD2 diet and nectar thickened fluids.Bruised on both arms,seizure precautions in place.Patient uses pure wick @ night,continent of bladder and bowels.Participating in therapies.Family brought in flowers and snacks for patient.VSS.        Patient's most recent vital signs are:     Vital signs:  BP: 122/70  Temp: 97.5  HR: 100  RR: 16  SpO2: 94 %     Patient does not have new respiratory symptoms.  Patient does not have new sore throat.  Patient does not have a fever greater than 99.5.

## 2021-06-09 NOTE — PLAN OF CARE
Discharge Planner Post-Acute Rehab OT:     Discharge Plan: Pt reports plan remains to return to her daughter's home with intermittent supervision.  Pt also anticipates continued assistance with finances, medication management, meal prep, general IADL, and bathing.  Pt would need to be independent and safe with toileting and light meal prep.    Precautions: falls    Current Status:  ADLs: CGA-Mod A sit <> stand with FWW, CGA-Min A ambulation with FWW and w/c follow d/t fatigue, Mod A LB dressing from EOB, Min A UB dressing, Min-Mod A bed mobility  IADLs: NT  Vision/Cognition: Pt oriented with short term memory deficits present.  Pt reports some difficulty reading as well    Assessment: Tx focus on BUE strengthening/conditionign to promote increased ease for functional transfers. Pt provided setup at [facility] and completed 4 sets x10 reps with 15#. Cuing for proper positioning in chair. Next, pt setup at ergometer and tolerated 3 minutes with light resistance from seated. Pt's cognition appears to improved from previous week along with increased activity tolerance.    Other Barriers to Discharge (DME, Family Training, etc): Safety, family training

## 2021-06-09 NOTE — PLAN OF CARE
A&O x4. Pt denied pain, nausea, SOB or numbness/tingling in extremities. Pt slept majority of shift. Scheduled medications administered as ordered. Call light within reach and pt able to make needs known.

## 2021-06-09 NOTE — PROGRESS NOTES
Pain Assessment    The following is the pain interview as conducted by the TCU RN caring for the patient on 6/9/21. This assessment is required by the [facility] for all patients in [place] SNF (Skilled Nursing Facilities).       1. Have you had pain or hurting at any time in the last 5 days?     []YES  [x]NO  []UNABLE TO ANSWER   []Not applicable    2. How much of the time have you experienced pain or hurting over the last 5 days?    []ALMOST CONSTANTLY []FREQUENTLY []OCCASIONALLY []RARELY []UNABLE TO ANSWER [x]Not applicable      3. Over the past 5 days, has pain made it hard for you to sleep at night?     []YES  []NO  []UNABLE TO ANSWER   [x]Not applicable    4. Over the past 5 days, have you limited your day-to-day activities because of pain?     []YES  []NO  []UNABLE TO ANSWER    [x]Not applicable    5. Pain intensity (Numeric scale used first-if patient unable to answer, verbal scale to be used.)    Numeric Scale: Please rate your worst pain over the last 5 days on a zero to ten scale, with zero being no pain and ten being the worst pain you can imagine.     Number:  n/a        /10    Verbal Scale: Please rate the intensity of your worst pain over the last 5 days.     [x]Not applicable []MILD/MODERATE-SEVERE []VERY SEVERE/HORRIBLE []UNABLE TO ANSWER       Gabriela Morgan RN, BSN  [facility]      [address] [address]  [email or web address]  [email or web address]   Office:[phone]  Gender pronouns: she/her

## 2021-06-09 NOTE — PROGRESS NOTES
[facility]  Internal Medicine Progress Note    TCU Day # 4    Assessment & Plan:   Olga Bailey is a 75 year old female with a history of glioblastoma s/p resection (2/23/21), HTN, GERD, asthma, depression, anxiety. She was initially admitted to [facility] from 4/26 to 5/15 for acute hypoxic respiratory failure 2/2 PJP pneumonia. She was discharged to  TCU but required re-admission to [facility] on 5/26 for seizure activity. Her Keppra dose was increased, steroids were started, and she has since remained seizure free. She now transfers back to TCU for physical rehabilitation.     # Seizures   # Glioblastoma multiforme s/p resection (2/23/21)   Completed 15 sessions of radiation on 5/27. Hem/onc was initially concerned that she might not be a candidate for chemotherapy  due to poor performance status. In June, will assess for whether or not to initiate temozolomide at adjuvant-dosing. Had breakthrough seizure on 5/26, Keppra dose increased and started on dexamethasone.  - Seizure precautions   - Completed Dexamethasone on 6/9/21  - Continue Keppra 1250mg BID   - Follow up with Dr. Baker in 1 month  - PT, OT    # MDD  # PARIS   Follows with Psychiatry and Psychology as outpatient. Seeing Health Psychology during recent admission and TCU stay.  - Health Psychology consult  - Buspar 30 mg BID  - Prozac 60 mg daily  - Seroquel 25 mg BID + 50 mg at bedtime  - Doxepin PRN for sleep    # Bilateral lower extremity DVT s/p IVC filter (4/16/21)   # Right retroperitoneal hematoma   Ultrasound 3/29 revealed BLE DVTs. CT negative for PE. Started on IV heparin, transitioned to Lovenox 70 mg. On 4/14 developed spontaneous retroperitoneal bleed requiring 4 units PRBCs. IVC filter placed 4/16. Eventually resumed on Lovenox 40 mg daily.   - Continue Lovenox subcutaneous 40 mg daily  - CBC qMTh    # Slow transit constipation - Continue Linzess, Miralax, simethicone.    # HTN - BPs normotensive.    - Continue PTA  Amlodipine and Lasix w/ hold parameters     Stable medical issues:   # GERD - Continue PTA PPI   # Mild intermittent asthma - Continue Albuterol PRN    Resolved problems:   # Recent PJP pneumonia - Required admission in April. Continue Bactrim once daily.  # Recent UTI - Completed course of oral cefdinir on 5/31.  # Urinary retention - requiring intermittent straight cath. Likely due to UTI. Now voiding spontaneously.      CODE: FULL  Diet: DD2 with nectar thick liquids  DVT: Lovenox  Indication for Psychotropic Medications: Buspar, Prozac, Seroquel, doxepin all at lowest effective dose for MDD, PARIS.  Pneumococcal Vaccination Status: Up-to-date  Disposition: Pending PT/OT completion        Kary Stovall, CNP, APRN  Internal Medicine [facility] Hospitalist  [facility]  Pager [phone]    ________________________________________________________________    Subjective & Interval History:      Olga is sitting in her chair.  She feels okay today, no complaints.  Reports some fatigue after therapy.  Otherwise denies chest pain, dyspnea, swelling, abdominal pain, and dysuria.      Last 24 hour care team notes reviewed.   ROS: 4 point ROS (including Respiratory, CV, GI and ) was performed and negative unless otherwise noted in HPI.     Medications: Reviewed in EPIC.    Physical Exam:    Blood pressure 122/70, pulse 75, temperature 97.5  F (36.4  C), temperature source Oral, resp. rate 16, weight 72.6 kg (160 lb), SpO2 95 %.    GENERAL: Alert and oriented x 3. Well nourished, well developed.  No acute distress.    HEENT: Normocephalic, atraumatic. Anicteric sclera. Mucous membranes moist.   CV: RRR. S1, S2. No murmurs appreciated.   RESPIRATORY: Effort normal on room air. Lungs CTAB with no wheezing, rales, or rhonchi.   GI: Abdomen soft and non distended, bowel sounds present x all 4 quadrants. No tenderness, rebound, or guarding.   NEUROLOGICAL: No focal deficits. Follows commands.  Strength equal in upper and  lower extremities.   MUSCULOSKELETAL: No joint swelling or tenderness. Moves all extremities.   EXTREMITIES: No gross deformities. No peripheral edema.   SKIN: Grossly warm, dry, and intact. No jaundice. No rashes.       ROUTINE IP LABS (Last four results)  CMP   Recent Labs   Lab 06/08/21  0613 06/07/21  0545 06/05/21  1641 06/04/21  0824 06/03/21  1558 06/03/21  0711   NA  --  138  --   --   --   --    POTASSIUM  --  3.6  --   --  4.0 3.4   CHLORIDE  --  104  --   --   --   --    CO2  --  28  --   --   --   --    ANIONGAP  --  6  --   --   --   --    GLC  --  86  --   --   --   --    BUN  --  21  --   --   --   --    CR 0.52 0.54 0.62 0.57  --   --    ESTIVEN  --  9.1  --   --   --   --      CBC   Recent Labs   Lab 06/08/21 0613 06/07/21 0545 06/04/21  0824   WBC  --  8.9  --    RBC  --  3.70*  --    HGB  --  11.4*  --    HCT  --  34.9*  --    MCV  --  94  --    MCH  --  30.8  --    MCHC  --  32.7  --    RDW  --  16.4*  --     210 243     INR No lab results found in last 7 days.

## 2021-06-09 NOTE — PLAN OF CARE
Discharge Planner Post-Acute Rehab PT:     Discharge Plan: home with daughter's assist, not 24 hour is pt's goal    Precautions: fall, hx of recent seizures, swallowing    Current Status:  Bed Mobility: cga to min A, Mod assist to scoot hips  Transfer: cga to min A stand pivot with FWW  Gait: 130 feet with FWW cga and w/c follow for safety  Stairs: TBA  Balance: cga to occasional min A when fatigued.    Assessment: Pt able to participate with gait training, 140 ft x 1, 70 ft x 2, needing seated rest breaks, wc follow due to JACOBSEN, LE fatigue and cga to Gerard with managing walker and for balance.  Pt also participated in stretching and strengthening standing exercise in the parallel bars. Cont to work towards PT goals.   Other Barriers to Discharge (DME, Family Training, etc): functional tolerance, medical complexity, functional status, neuro involvement

## 2021-06-10 ENCOUNTER — APPOINTMENT (OUTPATIENT)
Dept: OCCUPATIONAL THERAPY | Facility: SKILLED NURSING FACILITY | Age: 76
End: 2021-06-10
Payer: COMMERCIAL

## 2021-06-10 ENCOUNTER — APPOINTMENT (OUTPATIENT)
Dept: SPEECH THERAPY | Facility: SKILLED NURSING FACILITY | Age: 76
End: 2021-06-10
Payer: COMMERCIAL

## 2021-06-10 ENCOUNTER — APPOINTMENT (OUTPATIENT)
Dept: PHYSICAL THERAPY | Facility: SKILLED NURSING FACILITY | Age: 76
End: 2021-06-10
Payer: COMMERCIAL

## 2021-06-10 LAB
ANION GAP SERPL CALCULATED.3IONS-SCNC: 7 MMOL/L (ref 3–14)
BUN SERPL-MCNC: 27 MG/DL (ref 7–30)
CALCIUM SERPL-MCNC: 8.7 MG/DL (ref 8.5–10.1)
CHLORIDE SERPL-SCNC: 107 MMOL/L (ref 94–109)
CO2 SERPL-SCNC: 28 MMOL/L (ref 20–32)
CREAT SERPL-MCNC: 0.5 MG/DL (ref 0.52–1.04)
ERYTHROCYTE [DISTWIDTH] IN BLOOD BY AUTOMATED COUNT: 16.3 % (ref 10–15)
GFR SERPL CREATININE-BSD FRML MDRD: >90 ML/MIN/{1.73_M2}
GLUCOSE SERPL-MCNC: 86 MG/DL (ref 70–99)
HCT VFR BLD AUTO: 34.6 % (ref 35–47)
HGB BLD-MCNC: 11.5 G/DL (ref 11.7–15.7)
MCH RBC QN AUTO: 31.3 PG (ref 26.5–33)
MCHC RBC AUTO-ENTMCNC: 33.2 G/DL (ref 31.5–36.5)
MCV RBC AUTO: 94 FL (ref 78–100)
PLATELET # BLD AUTO: 194 10E9/L (ref 150–450)
POTASSIUM SERPL-SCNC: 3.6 MMOL/L (ref 3.4–5.3)
RBC # BLD AUTO: 3.68 10E12/L (ref 3.8–5.2)
SODIUM SERPL-SCNC: 142 MMOL/L (ref 133–144)
WBC # BLD AUTO: 8.6 10E9/L (ref 4–11)

## 2021-06-10 PROCEDURE — 250N000011 HC RX IP 250 OP 636: Performed by: PHYSICIAN ASSISTANT

## 2021-06-10 PROCEDURE — 120N000009 HC R&B SNF

## 2021-06-10 PROCEDURE — 250N000013 HC RX MED GY IP 250 OP 250 PS 637: Performed by: PHYSICIAN ASSISTANT

## 2021-06-10 PROCEDURE — 36415 COLL VENOUS BLD VENIPUNCTURE: CPT | Performed by: PHYSICIAN ASSISTANT

## 2021-06-10 PROCEDURE — 92526 ORAL FUNCTION THERAPY: CPT | Mod: GN

## 2021-06-10 PROCEDURE — 97110 THERAPEUTIC EXERCISES: CPT | Mod: GO

## 2021-06-10 PROCEDURE — 97530 THERAPEUTIC ACTIVITIES: CPT | Mod: GO

## 2021-06-10 PROCEDURE — 97116 GAIT TRAINING THERAPY: CPT | Mod: GP

## 2021-06-10 PROCEDURE — 97110 THERAPEUTIC EXERCISES: CPT | Mod: GP

## 2021-06-10 PROCEDURE — 99308 SBSQ NF CARE LOW MDM 20: CPT | Performed by: NURSE PRACTITIONER

## 2021-06-10 PROCEDURE — 97530 THERAPEUTIC ACTIVITIES: CPT | Mod: GP

## 2021-06-10 PROCEDURE — 85027 COMPLETE CBC AUTOMATED: CPT | Performed by: PHYSICIAN ASSISTANT

## 2021-06-10 PROCEDURE — 80048 BASIC METABOLIC PNL TOTAL CA: CPT | Performed by: PHYSICIAN ASSISTANT

## 2021-06-10 RX ADMIN — LINACLOTIDE 290 MCG: 145 CAPSULE, GELATIN COATED ORAL at 06:16

## 2021-06-10 RX ADMIN — AMLODIPINE BESYLATE 5 MG: 5 TABLET ORAL at 09:08

## 2021-06-10 RX ADMIN — BUSPIRONE HYDROCHLORIDE 30 MG: 15 TABLET ORAL at 22:07

## 2021-06-10 RX ADMIN — FUROSEMIDE 40 MG: 40 TABLET ORAL at 09:08

## 2021-06-10 RX ADMIN — SULFAMETHOXAZOLE AND TRIMETHOPRIM 1 TABLET: 400; 80 TABLET ORAL at 09:07

## 2021-06-10 RX ADMIN — ENOXAPARIN SODIUM 40 MG: 40 INJECTION SUBCUTANEOUS at 22:07

## 2021-06-10 RX ADMIN — PANTOPRAZOLE SODIUM 40 MG: 40 TABLET, DELAYED RELEASE ORAL at 16:24

## 2021-06-10 RX ADMIN — BUSPIRONE HYDROCHLORIDE 30 MG: 15 TABLET ORAL at 09:07

## 2021-06-10 RX ADMIN — LEVETIRACETAM 1250 MG: 750 TABLET, FILM COATED ORAL at 22:07

## 2021-06-10 RX ADMIN — CALCIUM POLYCARBOPHIL 625 MG: 625 TABLET, FILM COATED ORAL at 09:07

## 2021-06-10 RX ADMIN — PANTOPRAZOLE SODIUM 40 MG: 40 TABLET, DELAYED RELEASE ORAL at 06:16

## 2021-06-10 RX ADMIN — FLUOXETINE HYDROCHLORIDE 60 MG: 20 CAPSULE ORAL at 09:07

## 2021-06-10 RX ADMIN — QUETIAPINE FUMARATE 25 MG: 25 TABLET ORAL at 22:08

## 2021-06-10 RX ADMIN — QUETIAPINE FUMARATE 50 MG: 25 TABLET ORAL at 22:07

## 2021-06-10 RX ADMIN — QUETIAPINE FUMARATE 25 MG: 25 TABLET ORAL at 09:07

## 2021-06-10 RX ADMIN — LEVETIRACETAM 1250 MG: 750 TABLET, FILM COATED ORAL at 09:07

## 2021-06-10 RX ADMIN — DOXEPIN HYDROCHLORIDE 3 MG: 10 SOLUTION ORAL at 22:07

## 2021-06-10 NOTE — PLAN OF CARE
Discharge Planner Post-Acute Rehab OT:     Discharge Plan: Pt reports plan remains to return to her daughter's home with intermittent supervision.  Pt also anticipates continued assistance with finances, medication management, meal prep, general IADL, and bathing.  Pt would need to be independent and safe with toileting and light meal prep.    Precautions: falls    Current Status:  ADLs: CGA-Min A sit <> stand with FWW, CGA-Min A ambulation with FWW and w/c follow d/t fatigue, Mod A LB dressing from EOB, Min A UB dressing, SBA bed mobility  IADLs: NT  Vision/Cognition: Pt oriented with short term memory deficits present.  Pt reports some difficulty reading as well    Assessment: Pt with increased fatigue during today's session versus previous sessions, although patient had busy morning with other therapies. Pt with flat affect today, yet continues to be motivated. Pt tolerated UB exercise well. Completed unilateral reaching standing task which was very fatiguing for patient, tolerating approx 3-4 minutes before pt initiated seated rest break. No change in cognition noted from previous days. Cont with POC.     Other Barriers to Discharge (DME, Family Training, etc): Safety, family training

## 2021-06-10 NOTE — PLAN OF CARE
VS: VSS-denies chest pain, SOB, headache and nausea.    O2: Sating 96% on RA   Output: Incontinent x 1 this shift, uses purewick at night   Last BM: 06/09/21 per pt.   Activity: Assist of 1-2 with walker and GB   Skin: Intact, ex bruising on RLE and abdomen     Pain: Denies    CMS: BUE tremors    Dressing: None    Diet: DD2 with nectar thick liquids    LDA: None    Equipment: GB, walker and wheelchair    Plan: Continue with POC   Additional Info: Alert and oriented with some forgetfulness. Bed alarm activate for safety. Can communicate needs effectively. Up to the chair x 1. No acute issues or concern on this shift. Resting in bed with call light in reach.            Patient's most recent vital signs are:     Vital signs:  BP: 113/77  Temp: 97.8  HR: 106  RR: 18  SpO2: 96 %     Patient does not have new respiratory symptoms.  Patient does not have new sore throat.  Patient does not have a fever greater than 99.5.

## 2021-06-10 NOTE — PLAN OF CARE
Patient alert and oriented x 4. Patient denied pain and discomfort. Patient appears sleeping during rounds. Patient takes her pill whole with nectar thickened fluids. Bed alarm on- no attempt to get out of bed all shift. No respiratory distress noted. Will continue with current plan of care.        Patient's most recent vital signs are:     Vital signs:  BP: 122/76  Temp: 96.7  HR: 96  RR: 18  SpO2: 93 %     Patient does not have new respiratory symptoms.  Patient does not have new sore throat.  Patient does not have a fever greater than 99.5.

## 2021-06-10 NOTE — PLAN OF CARE
Discharge Planner Post-Acute Rehab SLP:     Discharge Plan: [place]    Precautions: Dysphagia    Current Status:  Communication: No difficulties with conversational tasks and able to follow directions without difficulties.  Cognition: Does not report changes in her cognition.  Swallow: Currently on NDD-2 diet with NTL. VFSS completed 5/26 showing silent aspiration. Anticipate repeat study within one week of current admission    Assessment: Pt is tolerating current diet. Continue dysphagia diet 2 and nectar thick liquids. Pt using strategies of small bites/sips, slow rate, and alternating solids and liquids independently. Cough x 1 when talking while eating solids, pt noticed this and refrained from talking while eating the remainder of the meal. Recommend repeat video swallow given silent aspiration of thin liquids on previous video swallow.     Other Barriers to Discharge (Family Training, etc): tbd

## 2021-06-10 NOTE — PLAN OF CARE
Pt is alert and oriented. Uses call light appropriately and able to make needs know. Managed headache with PRN tylenol x 1 with relief. Continues to be on DD2 diet with nectar thick liquids. Takes meds whole. Purewick on at night. Transfers with 1-2 assist using gait belt and walker. Call light is in reach. Continue with plan of care.      Patient's most recent vital signs are:     Vital signs:  BP: 122/76  Temp: 96.7  HR: 96  RR: 18  SpO2: 93 %     Patient does not have new respiratory symptoms.  Patient does not have new sore throat.  Patient does not have a fever greater than 99.5.

## 2021-06-11 ENCOUNTER — APPOINTMENT (OUTPATIENT)
Dept: GENERAL RADIOLOGY | Facility: CLINIC | Age: 76
End: 2021-06-11
Attending: NURSE PRACTITIONER
Payer: MEDICARE

## 2021-06-11 LAB
CREAT SERPL-MCNC: 0.54 MG/DL (ref 0.52–1.04)
GFR SERPL CREATININE-BSD FRML MDRD: >90 ML/MIN/{1.73_M2}
PLATELET # BLD AUTO: 202 10E9/L (ref 150–450)

## 2021-06-11 PROCEDURE — 250N000011 HC RX IP 250 OP 636: Performed by: PHYSICIAN ASSISTANT

## 2021-06-11 PROCEDURE — 97535 SELF CARE MNGMENT TRAINING: CPT | Mod: GO

## 2021-06-11 PROCEDURE — 82565 ASSAY OF CREATININE: CPT | Performed by: PHYSICIAN ASSISTANT

## 2021-06-11 PROCEDURE — 36415 COLL VENOUS BLD VENIPUNCTURE: CPT | Performed by: PHYSICIAN ASSISTANT

## 2021-06-11 PROCEDURE — 92611 MOTION FLUOROSCOPY/SWALLOW: CPT | Mod: GN | Performed by: SPEECH-LANGUAGE PATHOLOGIST

## 2021-06-11 PROCEDURE — 120N000009 HC R&B SNF

## 2021-06-11 PROCEDURE — 250N000013 HC RX MED GY IP 250 OP 250 PS 637: Performed by: PHYSICIAN ASSISTANT

## 2021-06-11 PROCEDURE — 97530 THERAPEUTIC ACTIVITIES: CPT | Mod: GP

## 2021-06-11 PROCEDURE — 74230 X-RAY XM SWLNG FUNCJ C+: CPT

## 2021-06-11 PROCEDURE — 97110 THERAPEUTIC EXERCISES: CPT | Mod: GP

## 2021-06-11 PROCEDURE — 97116 GAIT TRAINING THERAPY: CPT | Mod: GP

## 2021-06-11 PROCEDURE — 250N000013 HC RX MED GY IP 250 OP 250 PS 637: Performed by: NURSE PRACTITIONER

## 2021-06-11 PROCEDURE — 85049 AUTOMATED PLATELET COUNT: CPT | Performed by: PHYSICIAN ASSISTANT

## 2021-06-11 PROCEDURE — 74230 X-RAY XM SWLNG FUNCJ C+: CPT | Mod: 26 | Performed by: RADIOLOGY

## 2021-06-11 RX ORDER — QUETIAPINE FUMARATE 25 MG/1
25 TABLET, FILM COATED ORAL 2 TIMES DAILY
Status: DISCONTINUED | OUTPATIENT
Start: 2021-06-11 | End: 2021-06-15

## 2021-06-11 RX ORDER — BARIUM SULFATE 400 MG/ML
SUSPENSION ORAL ONCE
Status: COMPLETED | OUTPATIENT
Start: 2021-06-11 | End: 2021-06-11

## 2021-06-11 RX ADMIN — LINACLOTIDE 290 MCG: 145 CAPSULE, GELATIN COATED ORAL at 06:37

## 2021-06-11 RX ADMIN — QUETIAPINE FUMARATE 50 MG: 25 TABLET ORAL at 22:28

## 2021-06-11 RX ADMIN — FLUOXETINE HYDROCHLORIDE 60 MG: 20 CAPSULE ORAL at 07:44

## 2021-06-11 RX ADMIN — SULFAMETHOXAZOLE AND TRIMETHOPRIM 1 TABLET: 400; 80 TABLET ORAL at 07:46

## 2021-06-11 RX ADMIN — DOXEPIN HYDROCHLORIDE 3 MG: 10 SOLUTION ORAL at 22:32

## 2021-06-11 RX ADMIN — FUROSEMIDE 40 MG: 40 TABLET ORAL at 07:45

## 2021-06-11 RX ADMIN — LEVETIRACETAM 1250 MG: 750 TABLET, FILM COATED ORAL at 22:31

## 2021-06-11 RX ADMIN — PANTOPRAZOLE SODIUM 40 MG: 40 TABLET, DELAYED RELEASE ORAL at 17:23

## 2021-06-11 RX ADMIN — ENOXAPARIN SODIUM 40 MG: 40 INJECTION SUBCUTANEOUS at 22:27

## 2021-06-11 RX ADMIN — CALCIUM POLYCARBOPHIL 625 MG: 625 TABLET, FILM COATED ORAL at 07:45

## 2021-06-11 RX ADMIN — PANTOPRAZOLE SODIUM 40 MG: 40 TABLET, DELAYED RELEASE ORAL at 06:38

## 2021-06-11 RX ADMIN — BARIUM SULFATE 9 ML: 400 SUSPENSION ORAL at 13:36

## 2021-06-11 RX ADMIN — AMLODIPINE BESYLATE 5 MG: 5 TABLET ORAL at 07:46

## 2021-06-11 RX ADMIN — QUETIAPINE FUMARATE 25 MG: 25 TABLET ORAL at 07:45

## 2021-06-11 RX ADMIN — QUETIAPINE FUMARATE 25 MG: 25 TABLET ORAL at 19:09

## 2021-06-11 RX ADMIN — BUSPIRONE HYDROCHLORIDE 30 MG: 15 TABLET ORAL at 22:28

## 2021-06-11 RX ADMIN — LEVETIRACETAM 1250 MG: 750 TABLET, FILM COATED ORAL at 07:45

## 2021-06-11 RX ADMIN — BUSPIRONE HYDROCHLORIDE 30 MG: 15 TABLET ORAL at 07:44

## 2021-06-11 NOTE — PLAN OF CARE
VS: VSS-denies chest pain, SOB, headache and nausea.    O2: Sating 92% on RA   Output: Incontinent x 1 this shift, uses purewick at night   Last BM: 06/10/21   Activity: Assist of 1-2 with walker and GB   Skin: Intact, ex bruising on RLE and abdomen     Pain: Denies    CMS: BUE tremors    Dressing: None    Diet: DD2 with nectar thick liquids, down for swallow evaluation today.    LDA: None    Equipment: GB, walker and wheelchair    Plan: Continue with POC   Additional Info: Alert and oriented with some forgetfulness. Bed alarm activate for safety. Can communicate needs effectively. Up to the chair x 1. No acute issues or concern on this shift. Resting in bed with call light in reach.             Patient's most recent vital signs are:      Vital signs:  BP: 113/77  Temp: 97.8  HR: 106  RR: 18  SpO2: 96 %      Patient does not have new respiratory symptoms.  Patient does not have new sore throat.  Patient does not have a fever greater than 99.5.

## 2021-06-11 NOTE — PROGRESS NOTES
06/11/21 1300   General Information   Onset of Illness/Injury or Date of Surgery 04/26/21   Referring Physician TERA Ortiz   Pertinent History of Current Problem Olga Bailey is a 75 year old female with a history of glioblastoma s/p resection (2/23/21), HTN, GERD, asthma, depression, anxiety. She was initially admitted to [facility] from 4/26 to 5/15 for acute hypoxic respiratory failure 2/2 PJP pneumonia. She was discharged to  TCU but required re-admission to [facility] on 5/26 for seizure activity. Her Keppra dose was increased, steroids were started, and she has since remained seizure free.   General Observations SLP: had been seen for swallowing on TCU/acute hospital with VFSS 4/28, 5/21/2021 with aspiration on thin (and nectar first study)   Type of Evaluation   Type of Evaluation Swallow Evaluation   General Swallowing Observations   Current Diet/Method of Nutritional Intake (General Swallowing Observations, NIS) nectar-thick liquids;dysphagia level 2 (mechanically altered)   Respiratory Support (General Swallowing Observations) none   Swallowing Evaluation Videofluoroscopic swallow study (VFSS)   VFSS Evaluation   Views Taken left lateral   VFSS Textures Trialed Thin Liquids;Nectar-Thick Liquids;Solid   VFSS Eval: Thin Liquid Texture Trial   Mode of Presentation, Thin Liquid cup;straw;self-fed   Order of Presentation 1-4,5,12 cup, 5 with straw   Preparatory Phase WFL   Oral Phase, Thin Liquid   (min intemrittent oral residue,slight)   Pharyngeal Phase, Thin Liquid Residue in valleculae  (minimal)   Rosenbek's Penetration Aspiration Scale: Thin Liquid Trial Results 2 - contrast enters airway, remains above the vocal cords, no residue remains (penetration)  (flash intermittently. deeper with straw)   Diagnostic Statement SLP: pt demonstrated intermittently flash penetration by cup and straw, chin tuck resulted in slightly increased premature pharyngeal entry/delay, minimal vallecular residue after swallow  2/2 reduced tongue base retraction   VFSS Evaluation: Nectar Thick Liquid Texture Trial   Mode of Presentation, Nectar cup;self-fed   Order of Presentation 7.-9   Preparatory Phase WFL   Oral Phase, Nectar WFL   Pharyngeal Phase, [place] WFL   Rosenbek's Penetration Aspiration Scale: Nectar-Thick Liquid Trial Results 2 - contrast enters airway, remains above the vocal cords, no residue remains (penetration)  (flash penetration x1)   Diagnostic Statement sLP: pt appears safe with nectar, flash penetration during swallow x 1   VFSS Evaluation: Solid Food Texture Trial   Mode of Presentation, Solid spoon;fed by clinician   Order of Presentation 10,11   Oral Phase, Solid Premature pharyngeal entry  (premature spillage/delay first bolus, improved with second)   Pharyngeal Phase, Solid Delayed swallow reflex;Residue in valleculae  (delay first bolus)   Rosenbek's Penetration Aspiration Scale: Solid Food Trial Results 1 - no aspiration, contrast does not enter airway   Diagnostic Statement SLP: pt showed increased mastication time/premature spillage to valleculae first bolus/with delay to complete swallow, improved with second bolus, min vallecular residue, cleared with additional dry swallow and swallow of thin   Esophageal Phase of Swallow   Esophageal comments GERD diagnosed   Swallowing Recommendations   Diet Consistency Recommendations nectar-thick liquids;dysphagia level 2 (mechanically altered)  (will see at meal, assess to advance to all thin fluids)   Supervision Level for Intake patient independent   Mode of Delivery Recommendations bolus size, small;no straws   Recommended Feeding/Eating Techniques (Swallow Eval) patient is independent, no specific recommendations   Medication Administration Recommendations, Swallowing (SLP) pills with pureed   General Therapy Interventions   Planned Therapy Interventions Dysphagia Treatment   Dysphagia treatment Oropharyngeal exercise training;Modified diet education;Instruction  of safe swallow strategies;Compensatory strategies for swallowing   SLP Therapy Assessment/Plan   Criteria for Skilled Therapeutic Interventions Met (SLP Eval) yes;treatment indicated   SLP Diagnosis mild oropharyngeal dysphagia   Rehab Potential (SLP Eval) good, to achieve stated therapy goals   Therapy Frequency (SLP Eval) 3 times/wk   Predicted Duration of Therapy Intervention (SLP Eval) estimated 7-10 days   Comment, Therapy Assessment/Plan (SLP) SLP: pt seen for VFSS. Pt had previous studies 4/28 and 5/21/2021.  Pt given nectar, thin and solid textures.  Pt demonstrated improved swallow. Noting flash penetration intermittently with thin/nectar, without aspiration. Slight increased delay with chin tuck.  Noting premature pharyngeal entry (reduced tongue base elevation), and delayed swallow with 1of 2 solid trials, improving with second bolus/min vallecular residue.. Pt demonstrates mild oropharyngeal dysphagia, Recommend continue NDD2 diet, nectar fluids, but will likely advance to thin, following treatment session to further assess during meal/with food. will also work with pt to further advance solid textures as tolerated. Pt to sit up for po, small bites and sips, no straw, slow pace. Pt is below baseline and would benefit from skilled intervention to improve swallowing on least restrictive diet.   Therapy Plan Review/Discharge Plan (SLP)   Therapy Plan Review (SLP) evaluation/treatment results reviewed;care plan/treatment goals reviewed;risks/benefits reviewed;current/potential barriers reviewed;participants voiced agreement with care plan;participants included;patient   Therapy Certification   Start of Care Date 06/11/21   Certification date from 06/11/21   Certification date to 07/08/21   Medical Diagnosis glioblastoma, respiratory failure    Total Evaluation Time   Total Evaluation Time (Minutes) 30

## 2021-06-11 NOTE — PLAN OF CARE
Discharge Planner Post-Acute Rehab SLP:      Discharge Plan: tbd     Precautions: Dysphagia     Current Status:  Communication: No difficulties with conversational tasks and able to follow directions without difficulties.  Cognition: Does not report changes in her cognition.  Swallow: Currently on NDD-2 diet with NTL. VFSS completed 4/21 and 5/26 showing silent aspiration., repeat study 6/11/2021, no aspiration/flash penetration   Assessment: SLP: pt seen for VFSS. Pt had previous studies 4/28 and 5/21/2021.  Pt given nectar, thin and solid textures.  Pt demonstrated improved swallow. Noting flash penetration intermittently with thin/nectar, without aspiration. Slight increased delay with chin tuck.  Noting premature pharyngeal entry (reduced tongue base elevation), and delayed swallow with 1of 2 solid trials, improving with second bolus/min vallecular residue. Pt demonstrates mild oropharyngeal dysphagia, Recommend continue NDD2 diet, nectar fluids, but will likely advance to thin, following treatment session to further assess during meal/with food. will also work with pt to further advance solid textures as tolerated. Pt to sit up for po, small bites and sips, no straw, slow pace.   Other Barriers to Discharge (Family Training, etc): tbd

## 2021-06-11 NOTE — PLAN OF CARE
/71 (BP Location: Right arm)   Pulse 92   Temp 97.2  F (36.2  C) (Oral)   Resp 20   Wt 67.2 kg (148 lb 3.2 oz)   SpO2 93%   BMI 26.25 kg/m    Pt vitals stable. On room air, sats stable and denies SOB.  Denies pain, dizziness, nausea/vomiting or any discomfort. On Dd2 diet/nectar thick liquids. Swallows pills whole without difficulties. Incontinent of bladder.Pt uses pure wick at bedtime. BM x1, incontinent.Transfers with 1-2 assists with a walker and gait belt. Able to turn and reposition independently in bed. On seizure and fall precautions, bed alarm set for safety. Pt is alert and oriented x4. Able to make needs known and using call light appropriately.  Pt declined to use hospital CPAP or oxygen at night. Sats stable on room air.

## 2021-06-11 NOTE — PLAN OF CARE
Discharge Planner Post-Acute Rehab OT:     Discharge Plan: Pt reports plan remains to return to her daughter's home with intermittent supervision.  Pt also anticipates continued assistance with finances, medication management, meal prep, general IADL, and bathing.  Pt would need to be independent and safe with toileting and light meal prep.    Precautions: falls    Current Status:  ADLs: CGA-Min A sit <> stand with FWW, CGA-Min A ambulation with FWW and w/c follow d/t fatigue, Mod A LB dressing from EOB but min A when reclined in bed, SBA-Min A UB dressing, SBA bed mobility when given extra time.   IADLs: NT  Vision/Cognition: Pt oriented with short term memory deficits present.  Pt reports some difficulty reading as well    Assessment:  Min A supine to sit and min A amb w/ 2ww.  Verbal cues not to lean back and to the L and to turn fully, safely before sitting.  The bar on the wall was helpful and pt able to orient to the toilet before sitting.  Pt completed UB ADLs seated at the sink for 75% and standing for 25%.  Fatigued but goal oriented and motivated.  Min A LB dressing (shorts and slipper socks), SBA UB dressing after set-up and when given extra time. Good progress with mobility and ADLs since this th last saw patient a couple weeks ago. Cont with POC.     Other Barriers to Discharge (DME, Family Training, etc): Safety, family training

## 2021-06-11 NOTE — PLAN OF CARE
Discharge Planner Post-Acute Rehab PT:     Discharge Plan: home with daughter's assist, not 24 hour is pt's goal    Precautions: fall, hx of recent seizures, swallowing    Current Status:  Bed Mobility: cga to min A, Mod assist to scoot hips  Transfer: cga to min A stand pivot with FWW  Gait: 130 feet with FWW cga and w/c follow for safety  Stairs: TBA  Balance: cga to occasional min A when fatigued.    Assessment: Pt with increased fatigue this session, pt frustrated with how difficult activity felt this am in comparison to yesterday. Reminded pt that she had OT already this am and with that added activity before PT, would expect her to be fatigued this session. Only able to tolerated amb 45 ft x1 using ww Gerard.    Other Barriers to Discharge (DME, Family Training, etc): functional tolerance, medical complexity, functional status, neuro involvement

## 2021-06-12 ENCOUNTER — APPOINTMENT (OUTPATIENT)
Dept: SPEECH THERAPY | Facility: SKILLED NURSING FACILITY | Age: 76
End: 2021-06-12
Payer: COMMERCIAL

## 2021-06-12 ENCOUNTER — APPOINTMENT (OUTPATIENT)
Dept: PHYSICAL THERAPY | Facility: SKILLED NURSING FACILITY | Age: 76
End: 2021-06-12
Payer: COMMERCIAL

## 2021-06-12 PROCEDURE — 97530 THERAPEUTIC ACTIVITIES: CPT | Mod: GP

## 2021-06-12 PROCEDURE — 92611 MOTION FLUOROSCOPY/SWALLOW: CPT | Mod: GN | Performed by: SPEECH-LANGUAGE PATHOLOGIST

## 2021-06-12 PROCEDURE — 250N000011 HC RX IP 250 OP 636: Performed by: PHYSICIAN ASSISTANT

## 2021-06-12 PROCEDURE — 97110 THERAPEUTIC EXERCISES: CPT | Mod: GP

## 2021-06-12 PROCEDURE — 250N000013 HC RX MED GY IP 250 OP 250 PS 637: Performed by: PHYSICIAN ASSISTANT

## 2021-06-12 PROCEDURE — 97116 GAIT TRAINING THERAPY: CPT | Mod: GP

## 2021-06-12 PROCEDURE — 250N000013 HC RX MED GY IP 250 OP 250 PS 637: Performed by: NURSE PRACTITIONER

## 2021-06-12 PROCEDURE — 97112 NEUROMUSCULAR REEDUCATION: CPT | Mod: GP

## 2021-06-12 PROCEDURE — 92526 ORAL FUNCTION THERAPY: CPT | Mod: GN | Performed by: SPEECH-LANGUAGE PATHOLOGIST

## 2021-06-12 PROCEDURE — 120N000009 HC R&B SNF

## 2021-06-12 PROCEDURE — 022N000001 HC SNF RUG CODE OPNP

## 2021-06-12 RX ADMIN — PANTOPRAZOLE SODIUM 40 MG: 40 TABLET, DELAYED RELEASE ORAL at 16:34

## 2021-06-12 RX ADMIN — LEVETIRACETAM 1250 MG: 750 TABLET, FILM COATED ORAL at 08:56

## 2021-06-12 RX ADMIN — ENOXAPARIN SODIUM 40 MG: 40 INJECTION SUBCUTANEOUS at 21:08

## 2021-06-12 RX ADMIN — BUSPIRONE HYDROCHLORIDE 30 MG: 15 TABLET ORAL at 21:08

## 2021-06-12 RX ADMIN — FUROSEMIDE 40 MG: 40 TABLET ORAL at 08:55

## 2021-06-12 RX ADMIN — PANTOPRAZOLE SODIUM 40 MG: 40 TABLET, DELAYED RELEASE ORAL at 07:30

## 2021-06-12 RX ADMIN — QUETIAPINE FUMARATE 50 MG: 25 TABLET ORAL at 21:08

## 2021-06-12 RX ADMIN — FLUOXETINE HYDROCHLORIDE 60 MG: 20 CAPSULE ORAL at 08:55

## 2021-06-12 RX ADMIN — QUETIAPINE FUMARATE 25 MG: 25 TABLET ORAL at 08:56

## 2021-06-12 RX ADMIN — AMLODIPINE BESYLATE 5 MG: 5 TABLET ORAL at 08:55

## 2021-06-12 RX ADMIN — CALCIUM POLYCARBOPHIL 625 MG: 625 TABLET, FILM COATED ORAL at 08:56

## 2021-06-12 RX ADMIN — QUETIAPINE FUMARATE 25 MG: 25 TABLET ORAL at 16:34

## 2021-06-12 RX ADMIN — DOXEPIN HYDROCHLORIDE 3 MG: 10 SOLUTION ORAL at 21:13

## 2021-06-12 RX ADMIN — SULFAMETHOXAZOLE AND TRIMETHOPRIM 1 TABLET: 400; 80 TABLET ORAL at 08:55

## 2021-06-12 RX ADMIN — LINACLOTIDE 290 MCG: 145 CAPSULE, GELATIN COATED ORAL at 05:40

## 2021-06-12 RX ADMIN — LINACLOTIDE 290 MCG: 145 CAPSULE, GELATIN COATED ORAL at 05:39

## 2021-06-12 RX ADMIN — LEVETIRACETAM 1250 MG: 750 TABLET, FILM COATED ORAL at 21:08

## 2021-06-12 RX ADMIN — BUSPIRONE HYDROCHLORIDE 30 MG: 15 TABLET ORAL at 08:55

## 2021-06-12 NOTE — PLAN OF CARE
Patient alert and oriented x 4. Patient denied pain and discomfort. Patient appears sleeping,resting in bed during rounds. Patient takes her pill whole with nectar thickened fluids. Bed alarm on- no attempt to get out of bed all shift. No respiratory distress noted. Patient need encouragement to T and R in bed, independent with bed mobility. Will continue with current plan of care.          Patient's most recent vital signs are:     Vital signs:  BP: 131/77  Temp: 96.8  HR: 82  RR: 18  SpO2: 94 %     Patient does not have new respiratory symptoms.  Patient does not have new sore throat.  Patient does not have a fever greater than 99.5.

## 2021-06-12 NOTE — PLAN OF CARE
The patient is alert and oriented x 3. VSS. Able to make needs known. Denies pain or discomfort. DD2 with nectar thick liquid. Appetite is fair. Assist of 1-2 with walker. Incontinent of bowel and bladder. Bruises on RLE and abdomen. Intermittent BUE tremors. Spiritual consult ordered per patient's request. Continue to monitor for comfort.           Patient's most recent vital signs are:     Vital signs:  BP: 131/77  Temp: 96.8  HR: 82  RR: 18  SpO2: 94 %     Patient does not have new respiratory symptoms.  Patient does not have new sore throat.  Patient does not have a fever greater than 99.5.

## 2021-06-12 NOTE — PLAN OF CARE
Patient's most recent vital signs are:     Vital signs:  BP: 126/74  Temp: 98.2  HR: 87  RR: 20  SpO2: 91 %     Patient does not have new respiratory symptoms.  Patient does not have new sore throat.  Patient does not have a fever greater than 99.5.    Seems sad today. Quiet. Does not want to engage in conversation. Seen by  and noted patient worried about her son. Up in w/c much of the day. Back to bed this afternoon and sleeping. Fatigues easily. Diet has been upgraded to DD2 with thin liquids. Needs to be sitting up when eating her meals. Appetite-fair to good. Uses Purewick at night. Up to BR today and continent of urine. Also has incontinency episodes. No complaints of pain. No respiratory problems. Safety WNL's. Fall precautions maintained. Verbally expresses her needs. Skin intact with bruises; lia complexion.

## 2021-06-12 NOTE — PLAN OF CARE
Discharge Planner Post-Acute Rehab PT:      Discharge Plan: home with daughter's assist, not 24 hour is pt's goal     Precautions: fall, hx of recent seizures, swallowing     Current Status:  Bed Mobility: cga to min A, Mod assist to scoot hips  Transfer: cga to min A stand pivot with FWW  Gait: 130 feet with FWW cga and w/c follow for safety  Stairs: TBA  Balance: cga to occasional min A when fatigued.     Assessment: Pt with increased fatigue this session,  Only able to tolerated amb 50 ft x1 using ww Gerard.  Sit to stand; and standing neuro re ed to decrease R lean;  Tolerated Nustep L2 ~10 min with one rest;      Other Barriers to Discharge (DME, Family Training, etc): functional tolerance, medical complexity, functional status, neuro involvement

## 2021-06-12 NOTE — PLAN OF CARE
"Discharge Planner Post-Acute Rehab SLP:      Discharge Plan: tbd     Precautions: Dysphagia     Current Status:  Communication: Min difficulties with conversational tasks (word finding) and able to follow directions without difficulties.  Cognition: mild deficits at least since GBM resection 2/2021, had been seen prior admissions/OP, probable \"plateau\" as abilities appear unchanged, likely functional for needs.   Swallow: Currently on NDD-2 diet with thin fluids VFSS completed 4/21 and 5/26 showing silent aspiration., repeat study 6/11/2021, no aspiration/flash penetration   Assessment:SLP: pt seen at meal, further assessed tolerance of thin fluids (following VFSS yesterday), no outward sx aspiration, pt takiing small sips, educated no straw.  Recommend upgrade to thin fluids, continue NDD2, but will work on further diet advancement of solids    Pt to sit up for po, small bites and sips, no straw, slow pace.   Other Barriers to Discharge (Family Training, etc): tbd     "

## 2021-06-12 NOTE — PROGRESS NOTES
"SPIRITUAL HEALTH SERVICES  SPIRITUAL ASSESSMENT Progress Note  [facility] [place] [place] [facility]     REFERRAL SOURCE: I did visit this morning patient Olga per [facility] consult order. I have been paged to see the pt but pt was not ready because she went for therapy. On my second visit attempt, I got the pt in her room. Pt was so glad to see the . I introduced myself as the on call  and shared all the info about the [facility]. Pt said, \"The reason I requested a  is, yesterday my son came to visit me and I was so surprised because last week he was in trouble with the police. I am worried about my oldest son and his life struggle. I am old I have my own health problem and on top of that I am not feeling good regarding my son. Can you please pray for my son?\". I listened the pt reflectively and specially her current fear of her son Dario. I shared with the pt some up lifting and encouraging word from the Bible. I also told her strongly to focus first on her health and then to her son problem.  Pt knew that well but she worried to much about her son life struggle and personal problems more than her own pain. We discussed together how to balance things in our life before it become to much to carry on. At the end of our conversation, based on her request, I offered her a prayer specially for her son. Pt appreciated and felt good after the prayer.    PLAN: The unit  will notice the chart and will follow up the pt to provide spiritual care as needed.    Jason Rodriguez M.Div. ([place]), M.Th., D.Min., [facility]  Staff   Pager [phone]   "

## 2021-06-13 ENCOUNTER — APPOINTMENT (OUTPATIENT)
Dept: OCCUPATIONAL THERAPY | Facility: SKILLED NURSING FACILITY | Age: 76
End: 2021-06-13
Payer: COMMERCIAL

## 2021-06-13 PROCEDURE — 99309 SBSQ NF CARE MODERATE MDM 30: CPT | Performed by: PHYSICIAN ASSISTANT

## 2021-06-13 PROCEDURE — 97535 SELF CARE MNGMENT TRAINING: CPT | Mod: GO

## 2021-06-13 PROCEDURE — 120N000009 HC R&B SNF

## 2021-06-13 PROCEDURE — 250N000011 HC RX IP 250 OP 636: Performed by: PHYSICIAN ASSISTANT

## 2021-06-13 PROCEDURE — 250N000013 HC RX MED GY IP 250 OP 250 PS 637: Performed by: NURSE PRACTITIONER

## 2021-06-13 PROCEDURE — 250N000013 HC RX MED GY IP 250 OP 250 PS 637: Performed by: PHYSICIAN ASSISTANT

## 2021-06-13 PROCEDURE — 99207 PR CDG-CODE CATEGORY CHANGED: CPT | Performed by: PHYSICIAN ASSISTANT

## 2021-06-13 PROCEDURE — 99308 SBSQ NF CARE LOW MDM 20: CPT | Performed by: NURSE PRACTITIONER

## 2021-06-13 RX ADMIN — CALCIUM POLYCARBOPHIL 625 MG: 625 TABLET, FILM COATED ORAL at 09:10

## 2021-06-13 RX ADMIN — ENOXAPARIN SODIUM 40 MG: 40 INJECTION SUBCUTANEOUS at 21:27

## 2021-06-13 RX ADMIN — QUETIAPINE FUMARATE 25 MG: 25 TABLET ORAL at 16:39

## 2021-06-13 RX ADMIN — LEVETIRACETAM 1250 MG: 750 TABLET, FILM COATED ORAL at 09:10

## 2021-06-13 RX ADMIN — BUSPIRONE HYDROCHLORIDE 30 MG: 15 TABLET ORAL at 21:27

## 2021-06-13 RX ADMIN — QUETIAPINE FUMARATE 50 MG: 25 TABLET ORAL at 21:27

## 2021-06-13 RX ADMIN — SULFAMETHOXAZOLE AND TRIMETHOPRIM 1 TABLET: 400; 80 TABLET ORAL at 09:10

## 2021-06-13 RX ADMIN — PANTOPRAZOLE SODIUM 40 MG: 40 TABLET, DELAYED RELEASE ORAL at 16:39

## 2021-06-13 RX ADMIN — BUSPIRONE HYDROCHLORIDE 30 MG: 15 TABLET ORAL at 09:10

## 2021-06-13 RX ADMIN — FLUOXETINE HYDROCHLORIDE 60 MG: 20 CAPSULE ORAL at 09:11

## 2021-06-13 RX ADMIN — DOXEPIN HYDROCHLORIDE 3 MG: 10 SOLUTION ORAL at 21:27

## 2021-06-13 RX ADMIN — AMLODIPINE BESYLATE 5 MG: 5 TABLET ORAL at 09:10

## 2021-06-13 RX ADMIN — PANTOPRAZOLE SODIUM 40 MG: 40 TABLET, DELAYED RELEASE ORAL at 06:55

## 2021-06-13 RX ADMIN — LINACLOTIDE 290 MCG: 145 CAPSULE, GELATIN COATED ORAL at 06:55

## 2021-06-13 RX ADMIN — LEVETIRACETAM 1250 MG: 750 TABLET, FILM COATED ORAL at 21:27

## 2021-06-13 RX ADMIN — FUROSEMIDE 40 MG: 40 TABLET ORAL at 09:10

## 2021-06-13 RX ADMIN — QUETIAPINE FUMARATE 25 MG: 25 TABLET ORAL at 09:10

## 2021-06-13 NOTE — PLAN OF CARE
A&O x4, RA. Denies pain/SOB/chest pain. Incontinent B/B - purewick in place, LBM 6/11. Shifting weight independently in bed, staff assisted repositioning x2. Transfers 1-2A with walker/GB. Dd2 diet with thin liquids, denies N/V. Skin intact ex bruising and blanchable redness on nose from glasses. Sleeping well overnight. Continue to monitor.

## 2021-06-13 NOTE — PLAN OF CARE
The patient is alert and oriented x 3. VSS. Able to make needs known. Denies pain or discomfort. Upgraded to DD2 with thin liquid. Appetite is fair. Assist of 1-2 with walker. Incontinent of bowel and bladder. Bruises on RLE and abdomen. Intermittent BUE tremors. Spiritual consult initiated yesterday and seen by the  today. Had a shower tonight. Continue to monitor for comfort.         Patient's most recent vital signs are:     Vital signs:  BP: 116/63  Temp: 98.9  HR: 102  RR: 19  SpO2: 96 %     Patient does not have new respiratory symptoms.  Patient does not have new sore throat.  Patient does not have a fever greater than 99.5.

## 2021-06-13 NOTE — PLAN OF CARE
Patient's most recent vital signs are:     Vital signs:  BP: 115/72  Temp: 97.9  HR: 87  RR: 18  SpO2: 93 %     Patient does not have new respiratory symptoms.  Patient does not have new sore throat.  Patient does not have a fever greater than 99.5.    Up in w/c most of the morning and for meals. Good appetite. Still seems sad. When I questioned her about it, states he son recently gave her some bad news and she was sad and upset about it and worried for him. Her son visited her this morning. Participating in therapies, yet fatigues easily. Needing assist of two with transfers today. Quiet.

## 2021-06-13 NOTE — PLAN OF CARE
Discharge Planner Post-Acute Rehab OT:     Discharge Plan: Pt reports plan remains to return to her daughter's home with intermittent supervision.  Pt also anticipates continued assistance with finances, medication management, meal prep, general IADL, and bathing.  Pt would need to be independent and safe with toileting and light meal prep.    Precautions: falls    Current Status:  ADLs: CGA-Min A sit <> stand with FWW, CGA-Min A ambulation with FWW and w/c follow d/t fatigue, Mod A LB dressing from EOB but min A when reclined in bed, SBA-Min A UB dressing, SBA bed mobility when given extra time.   IADLs: NT  Vision/Cognition: Pt oriented with short term memory deficits present.  Pt reports some difficulty reading as well    Assessment:  Th providing overall mod A for LB dressing, sit to stand and pivot transfers today.  Pt stood at sink for brushing teeth but again needed mod A in standing.  She c/o increase back pain and all over pain as she over-did it yesterday. Toilet transfer mod A, pt had BM and needed Assist for hygiene, max A to pull pants down/up while pt worked on standing.  Th had to cue pt to stand tall as she demo stooped leg and trunk posture. She is fatigued.  Handoff given to nsg.  Cont with POC.     Other Barriers to Discharge (DME, Family Training, etc): Safety, family training

## 2021-06-13 NOTE — PROGRESS NOTES
[facility] Transitional Care Unit  Internal Medicine Progress Note    TCU Day # 8    Assessment & Plan: Olga Bailey is a 75 year old female with a history of glioblastoma multiforme s/p resection (2/23/21), HTN, GERD, asthma, anxiety and depression. She was initially admitted to [facility] from 4/16-5/15 for acute hypoxic respiratory failure 2/2 PJP pneumonia. She was discharged to  TCU but required readmission to [facility] on 5/26 for seizure activity. During her hospitalization, her Keppra dose was increased, steroids were started and she reamined seizure free. She was transferred back to  TCU on 6/5 for ongoing rehabilitation.    # Glioblastoma multiforme s/p resection (2/23/21)  # Seizures   S/p resection in 2/23/21. Completed 15 sessions of radiation on 5/27. Heme/Onc initially concerned she may not be candidate for chemotherapy due to poor performance status. Had breakthrough seizure 5/26 prompting inpatient admission. She was started on Dexamethasone taper (completed 6/9), and Keppra dose was increased.   - Seizure precautions  - s/p Dexamethasone taper (completed 6/9)  - Continue increased dose of Keppra 1250mg BID  - Follow-up with Dr. Baker as scheduled at [facility] clinic on 6/22 with MRI Brain, Labs and Clinic visit   - Follow-up with Radiation Oncology Dr. Spann for virtual visit oin 6/24  - Pepcid 10mg daily   - PT/OT    # MDD  # [place]  Follows with Psychiatry and Psychology as outpatient. Seeing Health Psychology during recent admission and TCU stay. Was started on Seroquel in 4/2021 for anxiety.   - Health Psychology consulted  - Continue PTA Buspar 30 mg BID  - Continue PTA Prozac 60 mg daily  - Continue PTA Seroquel 25 mg BID + 50 mg at bedtime - will need to consider dose reduction ifreported sleepiness is ongoing  - Continue PTA Doxepin 3mg at bedtime PRN for sleep - will need to consider dose reduction ifreported sleepiness is ongoing     # Bilateral lower extremity DVT s/p IVC filter  (4/16/21)  # History of right retroperitoneal hematoma (4/14/21)   Ultrasound 3/29 revealed BLE DVTs. CT negative for PE. Started on IV heparin, transitioned to Lovenox 70 mg. On 4/14 developed spontaneous retroperitoneal bleed requiring 4 units PRBCs. IVC filter placed 4/16. Vascular surgery consulted at that time - and repeat CT with stable area of bleeding, therefore no need for surgical intervention. Eventually resumed on Lovenox 40 mg daily for prophylaxis.   - Continue Lovenox subcutaneous 40 mg daily for DVT ppx   - CBC qMTh  - Continue ICV filter    # Slow transit constipation:  - Continue PTA Linzess, Miralax and Simethicone  - Continue PTA fiber supplementation daily     # HTN  - Continue PTA Amlodipine 5mg daily and Lasix 40mg daily with hold parameters    # Dysphagia:  SLP following. Had VFSS 4/21 and 5/26 showing silent aspiration. Repeat study 6/11 with no aspiration or flash penetration. On 6/12 SLP recommended upgrade from DD2 with nectar thick liquids to DD2 with thin fluids.   - SLP following  - DD2 with thin liquids    # Intermittent somnolence:  PT/OT reported increased somnolence today c/t previous days. On my assessment, patient was alert - no sign of sedation. She was oriented to self, place and knew the month was June, but got date and year incorrect (1955). RN reports she has been alert today without any concern. Provider noted patient to be sleepy yesterday but no acute concerns. On exam, no acute focal neurological deficit. 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 CN III-XII grossly intact. There was some discoordination with finger to nose. No sign of CVA, no facial asymmetry. No fever or leukocytosis. Could be intermittent sedation due to medications as well. No seizure like activity.   - Check UA/UCx  - Will consider holding Seroquel/Doxepin if ongoing sedation occurs / or titrating down dose   - Discussed with Dr. Petty, attending  "physician   - Please notify medicine with any acute change in mentation or acute neurological signs or symptoms     # Acute low back pain:   Patient reported acute low back pain x2 days - reports this is due to \"overdoing it,\" with therapy on 6/12. On exam, there is reproducible tenderness midline of lower L spine. No paraspinal muscle ttp. No radicular symptoms. Strength 5/5 in BLE, sensation intact and reflexes symmetric and intact. No history of trauma or fall. No loss of bowel/bladder function or saddle anesthesia.   - Will treat symptomatically with tylenol PRN, lidoderm patches, and aqua K pad - if no improvement - may need to consider imaging     # Anemia:  Hgb 11.4, MCV 95. At baseline.  - Trend CBC qM/Th    # Hypokalemia:  K 3.0, s/p replacement protocol with improvement to 4.1.   - Trend BMP qM/Th  - Mag added and WNL    Stable medical issues:  # GERD: Continue PTA PPI  # Mild intermittent asthma: Continue Albuterol PRN    Resolved problems:  # Recent PJP pneumonia: Had admission for [facility] in 4/2021 for PJP pneumonia. Now on bactrim once daily. Continue.    # Recent UTI: Completed course of oral cefdinir on 5/31.   # Urinary retention: Required intermittent straight caths likely 2/2 UTI, now voiding spontaneously.      Consulting Services: None    CODE: Full Code   DVT: Lovenox subcutaneous prophylaxis   Diet: DD2 with nectar thick liquids   Indications for Psychotropic Medications: Buspar, Prozac, Seroquel and Doxepin at lowest effective dose for MDD and [place]IS.   Pneumococcal Vaccination Status: Up-to-date  Disposition: 6/28 to daughter's house once PT/OT and SW plan in place.    Rabia López PA-C  Hospitalist Service  Pager: [phone]  ________________________________________________________________    Subjective & Interval History:      No overnight events. PT/OT this morning reported patient having new acute low back pain which was new over the weekend and was limiting therapies. Reported patient " "being more \"sleepy,\" and not appearing well to previous days.    Upon assessment, patient was alert, in no distress. She denies any chest pain, SOB, abdominal pain, N/V. She does report low back pain which she states has been there fore 2 days. Thinks it is from, \"over doing it,\" with therapy. Denies falls or trauma.  No loss of bowel/bladder function or saddle anesthesia. Denies radicular symptoms. Does have history of back surgery in the past.     No dysuria, frequency or urgency. No headache or change in vision.     Last 24 hour care team notes reviewed.   ROS: 4 point ROS (including Respiratory, CV, GI and ) was performed and negative unless otherwise noted in HPI.     Medications: Reviewed in EPIC.    Physical Exam:    Blood pressure 115/72, pulse 87, temperature 97.9  F (36.6  C), temperature source Oral, resp. rate 18, weight 67.2 kg (148 lb 3.2 oz), SpO2 93 %.    GENERAL: Alert and oriented x 2 (person, place, month but not year). No acute distress. Following commands. Answering questions appropriately.   HEENT: Anicteric sclera. Mucous membranes moist. PERRLA. No facial asymmetry. Speech normal.   CV: RRR. S1, S2. No murmurs appreciated.   RESPIRATORY: Effort normal on room air. Lungs CTAB with no wheezing, rales, rhonchi.   GI: Abdomen soft and non distended, bowel sounds present. No tenderness, rebound, guarding.   NEUROLOGICAL: No focal deficits. Moves all extremities. 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 CN III-XII grossly intact. There was some discoordination with finger to nose.   EXTREMITIES: No peripheral edema. Intact bilateral pedal pulses.   MUSCULOSKELETAL: There is tenderness to the L spine midline distribution. No paraspinal tenderness. Strength 5/5 in BLE. Sensation intact. Reflexes 2+ and symmetric in BLE. Distal pulse 2+. Cap refill <2 seconds.   SKIN: No jaundice. No rashes.     Lines/Tubes/Drains:    N/A              "

## 2021-06-13 NOTE — PROGRESS NOTES
[facility]  Internal Medicine Progress Note    TCU Day # 8    Assessment & Plan:   Olga Bailey is a 75 year old female with a history of glioblastoma s/p resection (2/23/21), HTN, GERD, asthma, depression, anxiety. She was initially admitted to [facility] from 4/26 to 5/15 for acute hypoxic respiratory failure 2/2 PJP pneumonia. She was discharged to  TCU but required re-admission to [facility] on 5/26 for seizure activity. Her Keppra dose was increased, steroids were started, and she has since remained seizure free. She now transfers back to TCU for physical rehabilitation.     # Seizures   # Glioblastoma multiforme s/p resection (2/23/21)   Completed 15 sessions of radiation on 5/27. Hem/onc was initially concerned that she might not be a candidate for chemotherapy  due to poor performance status. In June, will assess for whether or not to initiate temozolomide at adjuvant-dosing. Had breakthrough seizure on 5/26, Keppra dose increased and started on dexamethasone.  - Seizure precautions   - Completed Dexamethasone on 6/9/21  - Continue Keppra 1250mg BID   - Follow up with Dr. Baker in 1 month  - PT, OT    # HTN - BPs normotensive.    - Continue PTA Amlodipine and Lasix w/ hold parameters     # MDD  # [place]   Follows with Psychiatry and Psychology as outpatient. Seeing Health Psychology during recent admission and TCU stay.  - Health Psychology consult  - Buspar 30 mg BID  - Prozac 60 mg daily  - Seroquel 25 mg BID + 50 mg at bedtime  - Doxepin PRN for sleep    # Bilateral lower extremity DVT s/p IVC filter (4/16/21)   # Right retroperitoneal hematoma   Ultrasound 3/29 revealed BLE DVTs. CT negative for PE. Started on IV heparin, transitioned to Lovenox 70 mg. On 4/14 developed spontaneous retroperitoneal bleed requiring 4 units PRBCs. IVC filter placed 4/16. Eventually resumed on Lovenox 40 mg daily.   - Continue Lovenox subcutaneous 40 mg daily  - CBC qMTh    # Slow transit constipation -  Continue Linzess, Miralax, simethicone.    # Dysphagia - Advanced to thin liquids on 6/11.  Remains on DD2 diet.         Stable medical issues:   # GERD - Continue PTA PPI   # Mild intermittent asthma - Continue Albuterol PRN    Resolved problems:   # Recent PJP pneumonia - Required admission in April. Continue Bactrim once daily.  # Recent UTI - Completed course of oral cefdinir on 5/31.  # Urinary retention - requiring intermittent straight cath. Likely due to UTI. Now voiding spontaneously.      CODE: FULL  Diet: DD2 with nectar thick liquids  DVT: Lovenox  Indication for Psychotropic Medications: Buspar, Prozac, Seroquel, doxepin all at lowest effective dose for MDD, [place].  Pneumococcal Vaccination Status: Up-to-date  Disposition: Pending PT/OT completion        Kary Stovall, CNP, APRN  Internal Medicine [facility] Hospitalist  [facility]  Pager [phone]    ________________________________________________________________    Subjective & Interval History:      Olga is sitting in her chair.  She appears tired and says she had a very early therapy session.  Nodding off, but answers questions.  She says she is okay.      Last 24 hour care team notes reviewed.   ROS: 4 point ROS (including Respiratory, CV, GI and ) was performed and negative unless otherwise noted in HPI.     Medications: Reviewed in EPIC.    Physical Exam:    Blood pressure 115/72, pulse 87, temperature 97.9  F (36.6  C), temperature source Oral, resp. rate 18, weight 67.2 kg (148 lb 3.2 oz), SpO2 93 %.    GENERAL: Sleepy, but easily reoriented. Well nourished, well developed.  No acute distress.    HEENT: Normocephalic, atraumatic. Anicteric sclera. Mucous membranes moist.   CV: RRR. S1, S2. No murmurs appreciated.   RESPIRATORY: Effort normal on room air. Lungs CTAB with no wheezing, rales, or rhonchi.   GI: Abdomen soft and non distended, bowel sounds present x all 4 quadrants. No tenderness, rebound, or guarding.   NEUROLOGICAL: No  focal deficits. Follows commands.  Strength 4/5 equal in upper and lower extremities.   MUSCULOSKELETAL: No joint swelling or tenderness. Moves all extremities.   EXTREMITIES: No gross deformities. No peripheral edema.   SKIN: Grossly warm, dry, and intact. No jaundice. No rashes.       ROUTINE IP LABS (Last four results)  CMP   Recent Labs   Lab 06/11/21  0601 06/10/21  0641 06/08/21  0613 06/07/21  0545   NA  --  142  --  138   POTASSIUM  --  3.6  --  3.6   CHLORIDE  --  107  --  104   CO2  --  28  --  28   ANIONGAP  --  7  --  6   GLC  --  86  --  86   BUN  --  27  --  21   CR 0.54 0.50* 0.52 0.54   ESTIVEN  --  8.7  --  9.1     CBC   Recent Labs   Lab 06/11/21  0601 06/10/21  0641 06/08/21  0613 06/07/21  0545   WBC  --  8.6  --  8.9   RBC  --  3.68*  --  3.70*   HGB  --  11.5*  --  11.4*   HCT  --  34.6*  --  34.9*   MCV  --  94  --  94   MCH  --  31.3  --  30.8   MCHC  --  33.2  --  32.7   RDW  --  16.3*  --  16.4*    194 191 210     INR No lab results found in last 7 days.

## 2021-06-14 ENCOUNTER — APPOINTMENT (OUTPATIENT)
Dept: OCCUPATIONAL THERAPY | Facility: SKILLED NURSING FACILITY | Age: 76
End: 2021-06-14
Payer: COMMERCIAL

## 2021-06-14 ENCOUNTER — APPOINTMENT (OUTPATIENT)
Dept: SPEECH THERAPY | Facility: SKILLED NURSING FACILITY | Age: 76
End: 2021-06-14
Payer: COMMERCIAL

## 2021-06-14 ENCOUNTER — APPOINTMENT (OUTPATIENT)
Dept: PHYSICAL THERAPY | Facility: SKILLED NURSING FACILITY | Age: 76
End: 2021-06-14
Payer: COMMERCIAL

## 2021-06-14 LAB
ANION GAP SERPL CALCULATED.3IONS-SCNC: 6 MMOL/L (ref 3–14)
BUN SERPL-MCNC: 15 MG/DL (ref 7–30)
CALCIUM SERPL-MCNC: 8.8 MG/DL (ref 8.5–10.1)
CHLORIDE SERPL-SCNC: 105 MMOL/L (ref 94–109)
CO2 SERPL-SCNC: 27 MMOL/L (ref 20–32)
CREAT SERPL-MCNC: 0.51 MG/DL (ref 0.52–1.04)
ERYTHROCYTE [DISTWIDTH] IN BLOOD BY AUTOMATED COUNT: 16 % (ref 10–15)
GFR SERPL CREATININE-BSD FRML MDRD: >90 ML/MIN/{1.73_M2}
GLUCOSE SERPL-MCNC: 108 MG/DL (ref 70–99)
HCT VFR BLD AUTO: 34.9 % (ref 35–47)
HGB BLD-MCNC: 11.4 G/DL (ref 11.7–15.7)
LABORATORY COMMENT REPORT: NORMAL
MAGNESIUM SERPL-MCNC: 2 MG/DL (ref 1.6–2.3)
MCH RBC QN AUTO: 30.9 PG (ref 26.5–33)
MCHC RBC AUTO-ENTMCNC: 32.7 G/DL (ref 31.5–36.5)
MCV RBC AUTO: 95 FL (ref 78–100)
PLATELET # BLD AUTO: 202 10E9/L (ref 150–450)
POTASSIUM SERPL-SCNC: 3 MMOL/L (ref 3.4–5.3)
POTASSIUM SERPL-SCNC: 4.1 MMOL/L (ref 3.4–5.3)
RBC # BLD AUTO: 3.69 10E12/L (ref 3.8–5.2)
SARS-COV-2 RNA RESP QL NAA+PROBE: NEGATIVE
SODIUM SERPL-SCNC: 138 MMOL/L (ref 133–144)
SPECIMEN SOURCE: NORMAL
WBC # BLD AUTO: 7.4 10E9/L (ref 4–11)

## 2021-06-14 PROCEDURE — 250N000013 HC RX MED GY IP 250 OP 250 PS 637: Performed by: NURSE PRACTITIONER

## 2021-06-14 PROCEDURE — 84132 ASSAY OF SERUM POTASSIUM: CPT | Performed by: INTERNAL MEDICINE

## 2021-06-14 PROCEDURE — 250N000011 HC RX IP 250 OP 636: Performed by: PHYSICIAN ASSISTANT

## 2021-06-14 PROCEDURE — 87635 SARS-COV-2 COVID-19 AMP PRB: CPT | Performed by: NURSE PRACTITIONER

## 2021-06-14 PROCEDURE — 83735 ASSAY OF MAGNESIUM: CPT | Performed by: PHYSICIAN ASSISTANT

## 2021-06-14 PROCEDURE — 120N000009 HC R&B SNF

## 2021-06-14 PROCEDURE — 97110 THERAPEUTIC EXERCISES: CPT | Mod: GP

## 2021-06-14 PROCEDURE — 97535 SELF CARE MNGMENT TRAINING: CPT | Mod: GO

## 2021-06-14 PROCEDURE — 92526 ORAL FUNCTION THERAPY: CPT | Mod: GN

## 2021-06-14 PROCEDURE — 80048 BASIC METABOLIC PNL TOTAL CA: CPT | Performed by: PHYSICIAN ASSISTANT

## 2021-06-14 PROCEDURE — 85049 AUTOMATED PLATELET COUNT: CPT | Performed by: PHYSICIAN ASSISTANT

## 2021-06-14 PROCEDURE — 36415 COLL VENOUS BLD VENIPUNCTURE: CPT | Performed by: PHYSICIAN ASSISTANT

## 2021-06-14 PROCEDURE — 250N000013 HC RX MED GY IP 250 OP 250 PS 637: Performed by: PHYSICIAN ASSISTANT

## 2021-06-14 PROCEDURE — 85027 COMPLETE CBC AUTOMATED: CPT | Performed by: PHYSICIAN ASSISTANT

## 2021-06-14 PROCEDURE — 250N000013 HC RX MED GY IP 250 OP 250 PS 637: Performed by: INTERNAL MEDICINE

## 2021-06-14 PROCEDURE — 36415 COLL VENOUS BLD VENIPUNCTURE: CPT | Performed by: INTERNAL MEDICINE

## 2021-06-14 RX ORDER — METHOCARBAMOL 500 MG/1
500 TABLET, FILM COATED ORAL 3 TIMES DAILY PRN
Status: DISCONTINUED | OUTPATIENT
Start: 2021-06-14 | End: 2021-06-16

## 2021-06-14 RX ORDER — POTASSIUM CHLORIDE 750 MG/1
20 TABLET, EXTENDED RELEASE ORAL ONCE
Status: COMPLETED | OUTPATIENT
Start: 2021-06-14 | End: 2021-06-14

## 2021-06-14 RX ORDER — POTASSIUM CHLORIDE 750 MG/1
40 TABLET, EXTENDED RELEASE ORAL ONCE
Status: COMPLETED | OUTPATIENT
Start: 2021-06-14 | End: 2021-06-14

## 2021-06-14 RX ORDER — LIDOCAINE 4 G/G
1-2 PATCH TOPICAL
Status: DISCONTINUED | OUTPATIENT
Start: 2021-06-14 | End: 2021-06-22

## 2021-06-14 RX ADMIN — DOXEPIN HYDROCHLORIDE 3 MG: 10 SOLUTION ORAL at 22:06

## 2021-06-14 RX ADMIN — FLUOXETINE HYDROCHLORIDE 60 MG: 20 CAPSULE ORAL at 09:44

## 2021-06-14 RX ADMIN — LEVETIRACETAM 1250 MG: 750 TABLET, FILM COATED ORAL at 09:45

## 2021-06-14 RX ADMIN — POTASSIUM CHLORIDE 40 MEQ: 750 TABLET, EXTENDED RELEASE ORAL at 10:59

## 2021-06-14 RX ADMIN — LIDOCAINE 2 PATCH: 560 PATCH PERCUTANEOUS; TOPICAL; TRANSDERMAL at 15:00

## 2021-06-14 RX ADMIN — CALCIUM POLYCARBOPHIL 625 MG: 625 TABLET, FILM COATED ORAL at 09:44

## 2021-06-14 RX ADMIN — ACETAMINOPHEN 650 MG: 325 TABLET, FILM COATED ORAL at 09:43

## 2021-06-14 RX ADMIN — BUSPIRONE HYDROCHLORIDE 30 MG: 15 TABLET ORAL at 09:45

## 2021-06-14 RX ADMIN — PANTOPRAZOLE SODIUM 40 MG: 40 TABLET, DELAYED RELEASE ORAL at 16:27

## 2021-06-14 RX ADMIN — SULFAMETHOXAZOLE AND TRIMETHOPRIM 1 TABLET: 400; 80 TABLET ORAL at 09:45

## 2021-06-14 RX ADMIN — FUROSEMIDE 40 MG: 40 TABLET ORAL at 09:46

## 2021-06-14 RX ADMIN — LEVETIRACETAM 1250 MG: 750 TABLET, FILM COATED ORAL at 22:01

## 2021-06-14 RX ADMIN — AMLODIPINE BESYLATE 5 MG: 5 TABLET ORAL at 09:48

## 2021-06-14 RX ADMIN — QUETIAPINE FUMARATE 50 MG: 25 TABLET ORAL at 22:01

## 2021-06-14 RX ADMIN — QUETIAPINE FUMARATE 25 MG: 25 TABLET ORAL at 09:46

## 2021-06-14 RX ADMIN — BUSPIRONE HYDROCHLORIDE 30 MG: 15 TABLET ORAL at 22:01

## 2021-06-14 RX ADMIN — QUETIAPINE FUMARATE 25 MG: 25 TABLET ORAL at 17:13

## 2021-06-14 RX ADMIN — ACETAMINOPHEN 650 MG: 325 TABLET, FILM COATED ORAL at 15:01

## 2021-06-14 RX ADMIN — POTASSIUM CHLORIDE 20 MEQ: 750 TABLET, EXTENDED RELEASE ORAL at 13:00

## 2021-06-14 RX ADMIN — PANTOPRAZOLE SODIUM 40 MG: 40 TABLET, DELAYED RELEASE ORAL at 06:17

## 2021-06-14 RX ADMIN — LINACLOTIDE 290 MCG: 145 CAPSULE, GELATIN COATED ORAL at 06:17

## 2021-06-14 RX ADMIN — ENOXAPARIN SODIUM 40 MG: 40 INJECTION SUBCUTANEOUS at 22:00

## 2021-06-14 NOTE — PLAN OF CARE
Discharge Planner Post-Acute Rehab PT:      Discharge Plan: home with daughter's assist, not 24 hour is pt's goal     Precautions: fall, hx of recent seizures, swallowing     Current Status:  Bed Mobility: cga to min A, Mod assist to scoot hips  Transfer: cga to min A stand pivot with FWW  Gait: 130 feet with FWW cga and w/c follow for safety  Stairs: TBA  Balance: cga to occasional min A when fatigued.     Assessment: Pt very sleepy, unable to keep eyes open and stay awake with supine activity. Pt declined any OOB activity d/t fatigue, which is abnormal for pt who is normally very motivated for any activity down in the PT gym. -30 min     Other Barriers to Discharge (DME, Family Training, etc): functional tolerance, medical complexity, functional status, neuro involvement

## 2021-06-14 NOTE — PLAN OF CARE
The patient is alert and oriented x 3. Patient seem withdrawn, quiet and sad. Seen by the  yesterday for her concerns about her son. Denies pain and declined to share further feelings with staff. Emotional support provided and allowed patient to share thoughts as she deem fit. Appetite is fair. Assist of 1 with walker. Continue to monitor for comfort.         Patient's most recent vital signs are:     Vital signs:  BP: 120/69  Temp: 98  HR: 89  RR: 17  SpO2: 93 %     Patient does nothave new respiratory symptoms.  Patient does not have new sore throat.  Patient does not have a fever greater than 99.5.

## 2021-06-14 NOTE — PLAN OF CARE
"Discharge Planner Post-Acute Rehab SLP:      Discharge Plan: tbd     Precautions: Dysphagia     Current Status:  Communication: Min difficulties with conversational tasks (word finding) and able to follow directions without difficulties.  Cognition: mild deficits at least since GBM resection 2/2021, had been seen prior admissions/OP, probable \"plateau\" as abilities appear unchanged, likely functional for needs.   Swallow: Dysphagia diet 3 and thin liquids per video swallow completed 6/11.    Assessment: Pt assessed during meal. Pt consumed DD3 item with adequate mastication, slightly prolonged oral clearance time but WFL. Pt independently taking small bites and going slowly. No overt s/sx of aspiration with solids or liquids. Recommend advancement to dysphagia diet 3 and thin liquids. Pt to sit up for po, small bites and sips, no straw, slow pace.     Other Barriers to Discharge (Family Training, etc): tbd     "

## 2021-06-14 NOTE — PLAN OF CARE
Pt.sleeping well. Awakened pt.during rounds as leg was hanging off EOB and glasses on. Assisted with repositioning/removed glasses. Denied pain, discomfort, CP and SOB. Offered no c/o's or requests @ that time. Jessica intact. LBTED yesterday. Call light within reach.        Patient's most recent vital signs are:     Vital signs:  BP: 120/69  Temp: 98  HR: 89  RR: 17  SpO2: 93 %     Patient does not have new respiratory symptoms.  Patient does not have new sore throat.  Patient does not have a fever greater than 99.5.

## 2021-06-14 NOTE — PLAN OF CARE
"Discharge Planner Post-Acute Rehab OT:     Discharge Plan: Pt reports plan remains to return to her daughter's home with intermittent supervision.  Pt also anticipates continued assistance with finances, medication management, meal prep, general IADL, and bathing.  Pt would need to be independent and safe with toileting and light meal prep.    Precautions: falls    Current Status:  ADLs: CGA-Min A sit <> stand with FWW, CGA-Min A ambulation with FWW and w/c follow d/t fatigue, Mod A LB dressing from EOB but min A when reclined in bed, SBA-Min A UB dressing, SBA bed mobility when given extra time.   IADLs: NT  Vision/Cognition: Pt oriented with short term memory deficits present.  Pt reports some difficulty reading as well    Assessment: Upon arrival, pt reporting \"I overdid it this weekend and now my back is hurting\". Pt willing to complete bed mobility and trial sitting EOB. Pt required max A for bed mobility which is a substantial decline from last week. Pt unaware of soiled brief. Returned pt to supine to complete brad cares and brief change as completing from EOB as pt normally does with writer, appeared to be a safety concern d/t overall weakness/pain. Offered bed activities/exercise and pt declined stating, \"I really can't do anything more\". Ordered aqua cell heat pad as modality to ease back pain. Handoff given to NA and VERENA regarding functional decline.  Cont with POC.     Other Barriers to Discharge (DME, Family Training, etc): Safety, family training  "

## 2021-06-14 NOTE — PROGRESS NOTES
Respiratory Care note - Patient on BiPAP 10/5 and 50% for most of the shift. SpO2 dropped to 77% when changed to HFNC 100% for meals. Added Oxymask @ 15 LPM to HFNC and SpO2 around 90 - 91% when at rest. Did remain on HFNC for 2 hours in the morning and 1 hour at noon. Physician contacted regarding drop in SpO2. Mepliex in place with total face mask.    Total Number Of Aks Treated (Optional To Report): 0

## 2021-06-14 NOTE — PLAN OF CARE
"VS: See below   O2: none   Output: See flow sheets, patient uses the purwick   Last BM: 6/13/2021   Activity: Up with one and walker    Skin: Bruised,dry and flaky.    Pain: Complains of back pain today. This am she\"said it was constant and this afternoon it was not as bad\"   CMS: intact   Dressing: None   Diet: DD2 thin liquids   LDA: none   Equipment: Walker,heating pad,purwick   Plan: Continue to monitor back pain and overall mobility. Therapists were concerned this am that she wasn't moving like she was this weekend and that she was sleepy.    Additional Info: Aqua K pad applied to her back and patient medicated x 2 with Tylenol.Patient also started on Lidocaine patches (2) to her back around 1500. Patient alert x 4 for me and she directed her cares and she was able to turn from side in bed without any problems. Patient ate 50% of her lunch. K+ 3.0 this am and patient had replacement potassium po and has a 1700 K+ level recheck.            Patient's most recent vital signs are:     Vital signs:  BP: 113/65  Temp: 98.1  HR: 94  RR: 18  SpO2: 94 %     Patient does not have new respiratory symptoms.  Patient does not have new sore throat.  Patient does not have a fever greater than 99.5.         "

## 2021-06-15 ENCOUNTER — APPOINTMENT (OUTPATIENT)
Dept: PHYSICAL THERAPY | Facility: SKILLED NURSING FACILITY | Age: 76
End: 2021-06-15
Payer: COMMERCIAL

## 2021-06-15 ENCOUNTER — APPOINTMENT (OUTPATIENT)
Dept: OCCUPATIONAL THERAPY | Facility: SKILLED NURSING FACILITY | Age: 76
End: 2021-06-15
Payer: COMMERCIAL

## 2021-06-15 LAB
ALBUMIN UR-MCNC: NEGATIVE MG/DL
APPEARANCE UR: CLEAR
BACTERIA #/AREA URNS HPF: ABNORMAL /HPF
BILIRUB UR QL STRIP: NEGATIVE
COLOR UR AUTO: YELLOW
GLUCOSE UR STRIP-MCNC: NEGATIVE MG/DL
HGB UR QL STRIP: NEGATIVE
KETONES UR STRIP-MCNC: NEGATIVE MG/DL
LEUKOCYTE ESTERASE UR QL STRIP: NEGATIVE
MUCOUS THREADS #/AREA URNS LPF: PRESENT /LPF
NITRATE UR QL: NEGATIVE
PH UR STRIP: 5.5 PH (ref 5–7)
RBC #/AREA URNS AUTO: 2 /HPF (ref 0–2)
SOURCE: ABNORMAL
SP GR UR STRIP: 1.02 (ref 1–1.03)
SQUAMOUS #/AREA URNS AUTO: 0 /HPF (ref 0–1)
UROBILINOGEN UR STRIP-MCNC: NORMAL MG/DL (ref 0–2)
WBC #/AREA URNS AUTO: <1 /HPF (ref 0–5)

## 2021-06-15 PROCEDURE — 81001 URINALYSIS AUTO W/SCOPE: CPT | Performed by: PHYSICIAN ASSISTANT

## 2021-06-15 PROCEDURE — 97535 SELF CARE MNGMENT TRAINING: CPT | Mod: GO

## 2021-06-15 PROCEDURE — 250N000013 HC RX MED GY IP 250 OP 250 PS 637: Performed by: PHYSICIAN ASSISTANT

## 2021-06-15 PROCEDURE — 97110 THERAPEUTIC EXERCISES: CPT | Mod: GP

## 2021-06-15 PROCEDURE — 250N000011 HC RX IP 250 OP 636: Performed by: PHYSICIAN ASSISTANT

## 2021-06-15 PROCEDURE — 120N000009 HC R&B SNF

## 2021-06-15 PROCEDURE — 97530 THERAPEUTIC ACTIVITIES: CPT | Mod: GP

## 2021-06-15 PROCEDURE — 250N000013 HC RX MED GY IP 250 OP 250 PS 637: Performed by: NURSE PRACTITIONER

## 2021-06-15 RX ORDER — QUETIAPINE FUMARATE 25 MG/1
25 TABLET, FILM COATED ORAL 2 TIMES DAILY
Status: DISCONTINUED | OUTPATIENT
Start: 2021-06-15 | End: 2021-06-16

## 2021-06-15 RX ORDER — QUETIAPINE FUMARATE 25 MG/1
50 TABLET, FILM COATED ORAL AT BEDTIME
Status: DISCONTINUED | OUTPATIENT
Start: 2021-06-15 | End: 2021-06-16

## 2021-06-15 RX ORDER — QUETIAPINE FUMARATE 25 MG/1
25 TABLET, FILM COATED ORAL AT BEDTIME
Status: DISCONTINUED | OUTPATIENT
Start: 2021-06-15 | End: 2021-06-15

## 2021-06-15 RX ADMIN — PANTOPRAZOLE SODIUM 40 MG: 40 TABLET, DELAYED RELEASE ORAL at 06:29

## 2021-06-15 RX ADMIN — FUROSEMIDE 40 MG: 40 TABLET ORAL at 08:47

## 2021-06-15 RX ADMIN — ENOXAPARIN SODIUM 40 MG: 40 INJECTION SUBCUTANEOUS at 21:47

## 2021-06-15 RX ADMIN — FLUOXETINE HYDROCHLORIDE 60 MG: 20 CAPSULE ORAL at 08:47

## 2021-06-15 RX ADMIN — LEVETIRACETAM 1250 MG: 750 TABLET, FILM COATED ORAL at 21:47

## 2021-06-15 RX ADMIN — LEVETIRACETAM 1250 MG: 750 TABLET, FILM COATED ORAL at 08:47

## 2021-06-15 RX ADMIN — QUETIAPINE FUMARATE 50 MG: 25 TABLET ORAL at 21:48

## 2021-06-15 RX ADMIN — BUSPIRONE HYDROCHLORIDE 30 MG: 15 TABLET ORAL at 08:47

## 2021-06-15 RX ADMIN — BUSPIRONE HYDROCHLORIDE 30 MG: 15 TABLET ORAL at 21:47

## 2021-06-15 RX ADMIN — QUETIAPINE FUMARATE 25 MG: 25 TABLET ORAL at 16:57

## 2021-06-15 RX ADMIN — LINACLOTIDE 290 MCG: 145 CAPSULE, GELATIN COATED ORAL at 06:29

## 2021-06-15 RX ADMIN — SULFAMETHOXAZOLE AND TRIMETHOPRIM 1 TABLET: 400; 80 TABLET ORAL at 08:48

## 2021-06-15 RX ADMIN — CALCIUM POLYCARBOPHIL 625 MG: 625 TABLET, FILM COATED ORAL at 08:48

## 2021-06-15 RX ADMIN — AMLODIPINE BESYLATE 5 MG: 5 TABLET ORAL at 08:48

## 2021-06-15 RX ADMIN — PANTOPRAZOLE SODIUM 40 MG: 40 TABLET, DELAYED RELEASE ORAL at 16:57

## 2021-06-15 RX ADMIN — QUETIAPINE FUMARATE 25 MG: 25 TABLET ORAL at 08:48

## 2021-06-15 NOTE — PROGRESS NOTES
"Brief Medicine Note    Medicine following up for monitoring of mental status and low back pain.     Today, low back pain is significantly improved per patient. She is sitting up in the chair this morning reading the newspaper. She denies any chest pain, SOB, headache, paresthesias, abdominal pain, N/V. She has not yet been able to provide urine sample.     PT/OT still report patient seems \"off,\" compared to last week. Staff shared that patient reported she got \"bad news,\" from her son that she didn't want to go into over the weekend, therefore question if her mental health is contributing to her decreased motivation in therapies.     She gets the year wrong but states this has happened since her tumor resection. Called and spoke with patient's daughter who confirmed this. Daughter and patient are OK in decreasing any sedating medications if needed.     Today's vital signs, medications, and nursing notes were reviewed.      /78 (BP Location: Left arm)   Pulse 111   Temp 98.2  F (36.8  C) (Oral)   Resp 16   Wt 67.2 kg (148 lb 3.2 oz)   SpO2 95%   BMI 26.25 kg/m      GENERAL: Alert and oriented x 3. Appears comfortable. Answering questions appropriately. Sitting up in chair, reading newspaper.   HEENT: Anicteric sclera. Mucous membranes moist.   CV: RRR. S1, S2. No murmurs appreciated.   RESPIRATORY: Effort normal on room air. Lungs CTAB with no wheezing, rales, rhonchi.   GI: Abdomen soft and non distended, bowel sounds present. No tenderness, rebound, guarding.   MUSCULOSKELETAL: No joint swelling or tenderness.   NEUROLOGICAL: No new focal deficits. Moves all extremities. CN III-XII grossly intact.   EXTREMITIES: No peripheral edema. Intact bilateral pedal pulses.   SKIN: No jaundice. No rashes.       A/P:      # Intermittent somnolence, improved:  PT/OT reported increased somnolence today c/t previous days on 6/14. On my assessment, patient was alert - no sign of sedation. She was oriented to self, " "place and knew the month was June, but got date and year incorrect (1955). Discussed with patient and this is her baseline since tumor resection and spoke with daughter today who confirms this history. No acute focal neurological deficit. 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 No sign of seizure activity. Suspect intermittent somnolence may be 2/2 medication induced, also given prolonged hospitalization, isolation as well as bad news from son - mental health can be contributing.   - Check UA/UCx  - Discontinued Doxepin as this has TCA/antihistamine properties to assess if this improves clarity of mentation  - Continue Seroquel at current doses for now, will consider dose reduction   - Health Psychology consulted    - Please notify medicine with any acute change in mentation or acute neurological signs or symptoms      # Acute low back pain:   Patient reported acute low back pain x2 days - reports this is due to \"overdoing it,\" with therapy on 6/12. On exam, there is reproducible tenderness midline of lower L spine. No paraspinal muscle ttp. No radicular symptoms. Strength 5/5 in BLE, sensation intact and reflexes symmetric and intact. No history of trauma or fall. No loss of bowel/bladder function or saddle anesthesia. Significant improvement today per patient - suspect musculoskeletal etiology.  - Will treat symptomatically with tylenol PRN, lidoderm patches, and aqua K pad - if no improvement - may need to consider imaging      Rabia López PA-C  Hospitalist Service  Pager [phone]            "

## 2021-06-15 NOTE — PROGRESS NOTES
SPIRITUAL HEALTH SERVICES  SPIRITUAL ASSESSMENT Progress Note  [facility] ([facility]) TCU [id number] 6/15      REFERRAL SOURCE: Follow Up    Pt mentioned today was not a good day for her. She told a story of something going wrong that was  Not her fault and was out of her control, in which she lost something as a result of. She mentioned  He son was doing well and was so happy he  Visited her. Pt was not willing to speak much more than previous statements.    PLAN: Will follow up  With pt while on unit.    Giovanna Drew  Associate    Pager: [phone]

## 2021-06-15 NOTE — PLAN OF CARE
Patient sleeping between cares, has pure wick external cath in place, no BM this shift. Patient denies pain and SOB, call light is within reach and she is able to make needs known, continue to monitor.      Patient's most recent vital signs are:     Vital signs:  BP: 113/65  Temp: 98.1  HR: 94  RR: 18  SpO2: 94 %     Patient does nothave new respiratory symptoms.  Patient does not have new sore throat.  Patient does not have a fever greater than 99.5.

## 2021-06-15 NOTE — PLAN OF CARE
Discharge Planner Post-Acute Rehab OT:     Discharge Plan: Pt reports plan remains to return to her daughter's home with intermittent supervision.  Pt also anticipates continued assistance with finances, medication management, meal prep, general IADL, and bathing.  Pt would need to be independent and safe with toileting and light meal prep.    Precautions: falls    Current Status:  ADLs: CGA-Min A sit <> stand with FWW, CGA-Min A ambulation with FWW and w/c follow d/t fatigue, Mod A LB dressing from EOB but min A when reclined in bed, SBA-Min A UB dressing, SBA bed mobility when given extra time.   IADLs: NT  Vision/Cognition: Pt oriented with short term memory deficits present.  Pt reports some difficulty reading as well    Assessment: Pt unable to tolerate therapy at scheduled session d/t fatigue/pain from sitting in w/c for longer duration of time in AM. Returned to pt in PM and patient willing to participate in therapy. Pt continues to demo decreased functional performance versus previous week d/t increased weakness and L side lean. Pt required Max A for bed mobility and Max A for sitting balance progressing to Min A by end of session with use of LUE on bed to stabilize. Pt tolerated 25 minutes sitting EOB while conversating with writer, very fatiguing. Pt completed STS x3 from EOB with FWW; cuing for hand placement, foot placement, and forward lean. Pt required Mod/Max A for standing balance each trial. Pt did receive package from friend during session from [facility] that she was excited about and brightened spirits. Cont with POC.     Other Barriers to Discharge (DME, Family Training, etc): Safety, family training

## 2021-06-15 NOTE — PLAN OF CARE
Pt is alert and oriented. Uses call light appropriately and able to make needs known. Potassium re-check, 4.1. had a good appetite on this shift. Couldn't get urine for UA. Incoming RN notified. Denies pain, shortness of breath, headache, nausea or chest pain. Had a good appetite. Now on DD 3 with thin liquids. Was awake and alert this evening, turns from side to side without issues. Seizure precaution in place. Call light is in reach. Continue with plan of care.      Patient's most recent vital signs are:     Vital signs:  BP: 113/65  Temp: 98.1  HR: 94  RR: 18  SpO2: 94 %     Patient does not have new respiratory symptoms.  Patient does not have new sore throat.  Patient does not have a fever greater than 99.5.

## 2021-06-15 NOTE — PLAN OF CARE
Patient's most recent vital signs are:     Vital signs:  BP: 116/78  Temp: 98.2  HR: 111  RR: 16  SpO2: 95 %     Patient does not have new respiratory symptoms.  Patient does not have new sore throat.  Patient does not have a fever greater than 99.5.    Continues to be sad, quiet, yet has engaged in conversation today. Tires very easily. Face seems more puffy and tends to lean to left side more than last few days. Transferred from w/c to commode with 2 assists/pivot. Minimal L/E edema. Up in chair for meals. Appetite fair-good. Cell phone not working-staff to assist with calling family/friends. Health Psych consult ordered. States back is not as painful as yesterday. Refused lidoderm patch. Attempting to collect urine specimen after purewick removed. Did not void yet-will report to next shift.

## 2021-06-15 NOTE — PLAN OF CARE
Discharge Planner Post-Acute Rehab PT:      Discharge Plan: home with daughter's assist, not 24 hour is pt's goal     Precautions: fall, hx of recent seizures, swallowing     Current Status:  Bed Mobility: cga to min A, Mod assist to scoot hips  Transfer: cga to min A stand pivot with FWW  Gait: 130 feet with FWW cga and w/c follow for safety  Stairs: TBA  Balance: cga to occasional min A when fatigued.     Assessment: Pt feeling better today, however continue to note post lean and by end of session, leaning more so to the L. Did not amb today d/t this. Pt able to progress back to step ups in parallel bars, however O2 sats decreased to 82% on RA post 1st set, 86% post 2nd set needing 1-2 min to recover >90%.     Other Barriers to Discharge (DME, Family Training, etc): functional tolerance, medical complexity, functional status, neuro involvement

## 2021-06-16 ENCOUNTER — APPOINTMENT (OUTPATIENT)
Dept: SPEECH THERAPY | Facility: SKILLED NURSING FACILITY | Age: 76
End: 2021-06-16
Payer: MEDICARE

## 2021-06-16 ENCOUNTER — APPOINTMENT (OUTPATIENT)
Dept: PHYSICAL THERAPY | Facility: SKILLED NURSING FACILITY | Age: 76
End: 2021-06-16
Payer: MEDICARE

## 2021-06-16 ENCOUNTER — ANCILLARY PROCEDURE (OUTPATIENT)
Dept: NEUROLOGY | Facility: CLINIC | Age: 76
End: 2021-06-16
Attending: PHYSICIAN ASSISTANT
Payer: MEDICARE

## 2021-06-16 PROCEDURE — 92526 ORAL FUNCTION THERAPY: CPT | Mod: GN | Performed by: SPEECH-LANGUAGE PATHOLOGIST

## 2021-06-16 PROCEDURE — 97112 NEUROMUSCULAR REEDUCATION: CPT | Mod: GP | Performed by: PHYSICAL THERAPIST

## 2021-06-16 PROCEDURE — 97530 THERAPEUTIC ACTIVITIES: CPT | Mod: GP | Performed by: PHYSICAL THERAPIST

## 2021-06-16 PROCEDURE — 99207 PR CDG-CODE CATEGORY CHANGED: CPT | Performed by: PHYSICIAN ASSISTANT

## 2021-06-16 PROCEDURE — 250N000013 HC RX MED GY IP 250 OP 250 PS 637: Performed by: PHYSICIAN ASSISTANT

## 2021-06-16 PROCEDURE — 99309 SBSQ NF CARE MODERATE MDM 30: CPT | Performed by: PHYSICIAN ASSISTANT

## 2021-06-16 PROCEDURE — 97116 GAIT TRAINING THERAPY: CPT | Mod: GP | Performed by: PHYSICAL THERAPIST

## 2021-06-16 PROCEDURE — 95816 EEG AWAKE AND DROWSY: CPT

## 2021-06-16 PROCEDURE — 95816 EEG AWAKE AND DROWSY: CPT | Mod: 26 | Performed by: PSYCHIATRY & NEUROLOGY

## 2021-06-16 PROCEDURE — 250N000011 HC RX IP 250 OP 636: Performed by: PHYSICIAN ASSISTANT

## 2021-06-16 PROCEDURE — 120N000009 HC R&B SNF

## 2021-06-16 RX ORDER — QUETIAPINE FUMARATE 25 MG/1
25 TABLET, FILM COATED ORAL AT BEDTIME
Status: DISCONTINUED | OUTPATIENT
Start: 2021-06-16 | End: 2021-07-06

## 2021-06-16 RX ORDER — TEMOZOLOMIDE 250 MG/1
150 CAPSULE ORAL DAILY
Qty: 5 CAPSULE | Refills: 0 | Status: SHIPPED | OUTPATIENT
Start: 2021-06-16 | End: 2021-07-15

## 2021-06-16 RX ADMIN — LINACLOTIDE 290 MCG: 145 CAPSULE, GELATIN COATED ORAL at 07:08

## 2021-06-16 RX ADMIN — LEVETIRACETAM 1250 MG: 750 TABLET, FILM COATED ORAL at 09:13

## 2021-06-16 RX ADMIN — QUETIAPINE FUMARATE 25 MG: 25 TABLET ORAL at 09:13

## 2021-06-16 RX ADMIN — CALCIUM POLYCARBOPHIL 625 MG: 625 TABLET, FILM COATED ORAL at 09:12

## 2021-06-16 RX ADMIN — FUROSEMIDE 40 MG: 40 TABLET ORAL at 09:12

## 2021-06-16 RX ADMIN — SULFAMETHOXAZOLE AND TRIMETHOPRIM 1 TABLET: 400; 80 TABLET ORAL at 09:13

## 2021-06-16 RX ADMIN — ENOXAPARIN SODIUM 40 MG: 40 INJECTION SUBCUTANEOUS at 21:50

## 2021-06-16 RX ADMIN — AMLODIPINE BESYLATE 5 MG: 5 TABLET ORAL at 09:13

## 2021-06-16 RX ADMIN — LEVETIRACETAM 1250 MG: 750 TABLET, FILM COATED ORAL at 21:50

## 2021-06-16 RX ADMIN — PANTOPRAZOLE SODIUM 40 MG: 40 TABLET, DELAYED RELEASE ORAL at 17:42

## 2021-06-16 RX ADMIN — ACETAMINOPHEN 650 MG: 325 TABLET, FILM COATED ORAL at 07:07

## 2021-06-16 RX ADMIN — BUSPIRONE HYDROCHLORIDE 30 MG: 15 TABLET ORAL at 21:50

## 2021-06-16 RX ADMIN — BUSPIRONE HYDROCHLORIDE 30 MG: 15 TABLET ORAL at 09:13

## 2021-06-16 RX ADMIN — QUETIAPINE FUMARATE 25 MG: 25 TABLET ORAL at 21:50

## 2021-06-16 RX ADMIN — PANTOPRAZOLE SODIUM 40 MG: 40 TABLET, DELAYED RELEASE ORAL at 07:08

## 2021-06-16 RX ADMIN — FLUOXETINE HYDROCHLORIDE 60 MG: 20 CAPSULE ORAL at 09:12

## 2021-06-16 NOTE — PLAN OF CARE
VS: Blood pressure 116/57, pulse 98, temperature 97.9  F (36.6  C), temperature source Oral, resp. rate 18, weight 67.2 kg (148 lb 3.2 oz), SpO2 92 %.     O2: Pt on room air, denies SOB.   Output: Purewick cath, adequate output.   Last BM: 6/14. Uses BSC.    Activity: 1-2 assist with gait belt and walker.   Skin: No skin concerns.   Pain: Denies pain.   CMS: Denies N/T.   Dressing: N/A.   Diet: DD3, nectar thick.   LDA: Purewick.   Equipment: Purewick, walker, gait belt, commode.   Plan: Continue with plan of care.   Additional Info: Pt alert but disoriented to time. Pt is on seizure precautions, seizure pads in place and emergency equip at bedside. Pt slept on and off throughout the night.

## 2021-06-16 NOTE — PROGRESS NOTES
Neurology Telephone Note    I discussed Olga's care with her TCU NP Rabia today. Overall she is doing well but seems more fatigued today than before. She intermittently falls asleep while working with therapies and at one point today was noted to lick her lips several times while being less responsive. She The patient does have a history of seizures (usually followed by aphasia and right hemiparesis) and is on Keppra 750 mg BID. I think it's reasonable to get an EEG, but this will be [place] and unlikely to capture target spells. We discussed watching her closely and if she continues to have spells of unresponsiveness or concern for automatisms, then we can empirically increase the Keppra. Other causes of somnolence may be the Seroquel, and of course infection is always on the differential.    Nelsy Patricio DO  Neurology

## 2021-06-16 NOTE — PROGRESS NOTES
CLINICAL NUTRITION SERVICES - REASSESSMENT NOTE     Nutrition Prescription    RECOMMENDATIONS FOR MDs/PROVIDERS TO ORDER:  ADAT pending SLP recommendations    Malnutrition Status:    Patient does not meet two of the established criteria necessary for diagnosing malnutrition but is at risk for malnutrition    Recommendations already ordered by Registered Dietitian (RD):  Magic Shake (no chocolate) at PM snack    Future/Additional Recommendations:  If intakes decline, increase frequency of Magic Shake      EVALUATION OF THE PROGRESS TOWARD GOALS   Diet: Dysphagia Level 3: Advanced and Nectar Thickened Liquids   Intake: 50-75% of meals per flowsheet since diet was advanced. On average, pt is ordering 1730 kcal and 85 g protein daily per [employer]. If she is eating ~70% of meals, this is likely meeting 73% minimum energy and 100% protein needs. Pt has a good appetite per RN notes.      NEW FINDINGS   - Per VFSS on 6/11, no aspiration/flash penetration was noted. Diet advanced from DD2/nectar to DD3/thins on 6/14.  - Wt fluctuates between ~150-160 lbs over the last 4 months. Most recent 12 lb wt loss over 4 days is questionable (trace-mild edema noted on 6/11); overall wt similar from 1 month ago.  06/09/21 : 67.2 kg (148 lb 3.2 oz)  06/05/21 : 72.6 kg (160 lb)  06/05/21 : 73.7 kg (162 lb 7.7 oz)  06/03/21 : 68.1 kg (150 lb 1.6 oz)  06/01/21 : 68.6 kg (151 lb 4.8 oz)   05/29/21 : 70 kg (154 lb 6.4 oz)   05/19/21 : 69.9 kg (154 lb)  05/15/21 : 70.3 kg (155 lb)   05/10/21 : 67.1 kg (147 lb 14.9 oz)  04/26/21 : 74.4 kg (164 lb 0.4 oz)  03/23/21 : 70 kg (154 lb 6.4 oz)  03/08/21 : 69.9 kg (154 lb)  02/26/21 : 74.4 kg (164 lb 1.6 oz)  11/20/15 : 68 kg (150 lb)    MALNUTRITION  % Intake: Decreased intake does not meet criteria  % Weight Loss: Weight loss does not meet criteria  Subcutaneous Fat Loss: None observed  Muscle Loss: Temporal, Upper arm (bicep, tricep) and Dorsal hand: mild  Fluid Accumulation/Edema: None  noted  Malnutrition Diagnosis: Patient does not meet two of the established criteria necessary for diagnosing malnutrition but is at risk for malnutrition    Previous Goals   Patient to consume % of nutritionally adequate meal trays TID, or the equivalent with supplements/snacks.  Evaluation: Met    Previous Nutrition Diagnosis  Predicted inadequate protein-energy intake related to variable appetite as evidenced by pt reliant on PO intakes to meet 100% of nutritional needs with potential for variation     Evaluation: No change    CURRENT NUTRITION DIAGNOSIS  Predicted inadequate protein-energy intake related to variable appetite as evidenced by pt reliant on PO intakes to meet 100% of nutritional needs with potential for variation       INTERVENTIONS  Implementation  Medical food supplement therapy - was not ordered at last visit - will order today    Goals  Patient to consume % of nutritionally adequate meal trays TID, or the equivalent with supplements/snacks.    Monitoring/Evaluation  Progress toward goals will be monitored and evaluated per protocol.      Alicia Francis RD, BENITO  TCU/8A Pager (M-F): [phone]  Weekend Pager ([place]): [phone]

## 2021-06-16 NOTE — PLAN OF CARE
"Discharge Planner Post-Acute Rehab SLP:      Discharge Plan: tbd     Precautions: Dysphagia     Current Status:  Communication: Min difficulties with conversational tasks (word finding) and able to follow directions without difficulties.  Cognition: mild deficits at least since GBM resection 2/2021, had been seen prior admissions/OP, probable \"plateau\" as abilities appear unchanged, likely functional for needs.   Swallow: Dysphagia diet 3 and thin liquids per video swallow completed 6/11.     Assessment: Patient seen at meal to assess diet tolerance on dysphagia level 3 diet with thin liquids. Patient positioned upright in bed. Note straw in cup upon SLP arrival; provided further instruction about not using straws due to risk for silent aspiration. Patient agreeable. Patient consumed several bites of rice, green beans, chicken, canned pears and jinny food cake. No overt aspiration signs occurred across consistencies. Note slow but functional mastication of solid texture and patient able to clear minimal oral residue using tongue. Current diet level appears appropriate given slow mastication and mild oral residue. Patient stated 2 of 3 swallow instructions independently at end of session and needed min cue to recall third instruction.    Recommend continue dysphagia diet 3 and thin liquids. Pt to sit up for po, small bites and sips, no straw, slow pace.      Other Barriers to Discharge (Family Training, etc): tbd    "

## 2021-06-16 NOTE — PLAN OF CARE
Discharge Planner Post-Acute Rehab PT:      Discharge Plan: home with daughter's assist, not 24 hour is pt's goal     Precautions: fall, hx of recent seizures, swallowing     Current Status:  Bed Mobility: cga to min A, Mod assist to scoot hips  Transfer: cga to min A stand pivot with FWW  Gait: 130 feet with FWW cga and w/c follow for safety  Stairs: TBA  Balance: cga to occasional min A when fatigued.     Assessment: Pt was very tired today, but able to participate.  Pt with incontinence initially, needing A to clean up.  Pt with lean in sitting initially, to R when first sitting up, but balance improved as pt more alert, and was able to ambulate in the bars, with cues to not hold breath with activity.  Sats to 89-90% on RA with ambulation, at rest 93% on RA.      Other Barriers to Discharge (DME, Family Training, etc): functional tolerance, medical complexity, functional status, neuro involvement

## 2021-06-16 NOTE — PLAN OF CARE
Patient's most recent vital signs are:     Vital signs:  BP: 120/66  Temp: 97.8  HR: 91  RR: 18  SpO2: 95 %     Patient does not have new respiratory symptoms.  Patient does not have new sore throat.  Patient does not have a fever greater than 99.5.    More alert and interactive today. Very pleasant. Good appetite. Participating in therapies. Complete skin assessment done. Blanchable redness inner crease of buttocks. Bruises. Christiano skin complexion. Generalized weakness. Had EEG this afternoon for possible seizure activity. No noted S/S of seizure activity today.

## 2021-06-16 NOTE — PLAN OF CARE
Pt is alert and oriented. Uses call light appropriately and able to make needs known. Denies pain, shortness of breath, headache, nausea or chest pain. Had a good appetite. Pt is on DD3 with thin liquids. UA collected per order, see results. Was awake and alert this evening, turns from side to side without issues. Seizure precaution in place. Call light is in reach. Continue with plan of care.      Patient's most recent vital signs are:     Vital signs:  BP: 116/57  Temp: 97.9  HR: 98  RR: 18  SpO2: 92 %     Patient does not have new respiratory symptoms.  Patient does not have new sore throat.  Patient does not have a fever greater than 99.5.

## 2021-06-16 NOTE — PROGRESS NOTES
Brief Medicine Note    Assessed patient this morning. She was alert and sitting up in bed. Reported doing well and she denied any back pain as this has resolved. She denies chest pain, SOB, abdominal pain, N/V. Reports sleeping well.    Contacted by Health Psychology today as patient was witnessed during her interview to close her eyes abruptly during interview and was licking her lips. Had question if this could be representative of possible seizure. She stated she witnessed 3 episodes in 10 min period of time. Patient would be aroused and at baseline afterwards.    Re-assessed patient this afternoon, I witnessed patient being more sleepy and would close eyes but easily aroused and answering questions. She reports feeling more tired since her hospitalization. Denies any headache, blurred vision, or paresthesias.     Today's vital signs, medications, and nursing notes were reviewed.      /66 (BP Location: Right arm)   Pulse 91   Temp 97.8  F (36.6  C) (Oral)   Resp 18   Wt 67.2 kg (148 lb 3.2 oz)   SpO2 95%   BMI 26.25 kg/m    GENERAL: Alert and oriented x 3. Appears comfortable. Answering questions appropriately. Sleepy but easily aroused.   HEENT: Anicteric sclera. Mucous membranes moist.   CV: RRR. S1, S2. No murmurs appreciated.   RESPIRATORY: Effort normal on room air. Lungs CTAB with no wheezing, rales, rhonchi.   GI: Abdomen soft and non distended, bowel sounds present. No tenderness, rebound, guarding.   MUSCULOSKELETAL: No joint swelling or tenderness.   NEUROLOGICAL: No focal deficits. Moves all extremities. R pronator drift at baseline.   EXTREMITIES: No peripheral edema. Intact bilateral pedal pulses.   SKIN: No jaundice. No rashes.       A/P:  # History of glioblastoma  # History of seizures  # Intermittent episodes of sedation:  Question if these episodes described above are indicative of over sedation 2/2 medications: Keppra (increased dose during inpatient admission to 1250mg BID),  Seroquel vs evidence of recurrent seizure activity. No signs or symptoms to suggest infection. UA from 6/15 unremarkable. No fever/leukocytosis. Discontinued Doxepin on 6/15. Patient and daughter agreeable to decreasing Seroquel dose. Most recent brain MRI negative 4/26.   - Discussed with Neurology - will order EEG (routine) while here (see note for details)  - RN to notify medicine with any sign of seizure activity: change in mental status, lick smacking, jerking, change in neurological exam, etc.  - Check Keppra level in AM (12 hour trough)  - Discontinued Seroquel 25mg BID and decreased bedtime dose from 50mg to 25mg   - Discontinued Doxepin 3mg on 6/15   - seizure preacutions  - Continue Keppra 1250mg BID     Discussed with Dr. Swanson, attending physician.    Rabia López PA-C  Hospitalist Service  Pager [phone]       home

## 2021-06-17 ENCOUNTER — APPOINTMENT (OUTPATIENT)
Dept: SPEECH THERAPY | Facility: SKILLED NURSING FACILITY | Age: 76
End: 2021-06-17
Payer: MEDICARE

## 2021-06-17 ENCOUNTER — APPOINTMENT (OUTPATIENT)
Dept: PHYSICAL THERAPY | Facility: SKILLED NURSING FACILITY | Age: 76
End: 2021-06-17
Payer: MEDICARE

## 2021-06-17 ENCOUNTER — APPOINTMENT (OUTPATIENT)
Dept: OCCUPATIONAL THERAPY | Facility: SKILLED NURSING FACILITY | Age: 76
End: 2021-06-17
Payer: MEDICARE

## 2021-06-17 LAB
ALBUMIN SERPL-MCNC: 2.7 G/DL (ref 3.4–5)
ALP SERPL-CCNC: 91 U/L (ref 40–150)
ALT SERPL W P-5'-P-CCNC: 27 U/L (ref 0–50)
ANION GAP SERPL CALCULATED.3IONS-SCNC: 6 MMOL/L (ref 3–14)
AST SERPL W P-5'-P-CCNC: 18 U/L (ref 0–45)
BILIRUB SERPL-MCNC: 0.2 MG/DL (ref 0.2–1.3)
BUN SERPL-MCNC: 16 MG/DL (ref 7–30)
CALCIUM SERPL-MCNC: 8.9 MG/DL (ref 8.5–10.1)
CHLORIDE SERPL-SCNC: 107 MMOL/L (ref 94–109)
CO2 SERPL-SCNC: 27 MMOL/L (ref 20–32)
CREAT SERPL-MCNC: 0.51 MG/DL (ref 0.52–1.04)
ERYTHROCYTE [DISTWIDTH] IN BLOOD BY AUTOMATED COUNT: 15.8 % (ref 10–15)
GFR SERPL CREATININE-BSD FRML MDRD: >90 ML/MIN/{1.73_M2}
GLUCOSE SERPL-MCNC: 96 MG/DL (ref 70–99)
HCT VFR BLD AUTO: 34.3 % (ref 35–47)
HGB BLD-MCNC: 10.9 G/DL (ref 11.7–15.7)
MAGNESIUM SERPL-MCNC: 1.7 MG/DL (ref 1.6–2.3)
MCH RBC QN AUTO: 30.6 PG (ref 26.5–33)
MCHC RBC AUTO-ENTMCNC: 31.8 G/DL (ref 31.5–36.5)
MCV RBC AUTO: 96 FL (ref 78–100)
PHOSPHATE SERPL-MCNC: 3.6 MG/DL (ref 2.5–4.5)
PLATELET # BLD AUTO: 244 10E9/L (ref 150–450)
POTASSIUM SERPL-SCNC: 3.5 MMOL/L (ref 3.4–5.3)
PROT SERPL-MCNC: 6 G/DL (ref 6.8–8.8)
RBC # BLD AUTO: 3.56 10E12/L (ref 3.8–5.2)
SODIUM SERPL-SCNC: 140 MMOL/L (ref 133–144)
WBC # BLD AUTO: 5.4 10E9/L (ref 4–11)

## 2021-06-17 PROCEDURE — 120N000009 HC R&B SNF

## 2021-06-17 PROCEDURE — 250N000011 HC RX IP 250 OP 636: Performed by: PHYSICIAN ASSISTANT

## 2021-06-17 PROCEDURE — 97110 THERAPEUTIC EXERCISES: CPT | Mod: GP

## 2021-06-17 PROCEDURE — 97530 THERAPEUTIC ACTIVITIES: CPT | Mod: GP

## 2021-06-17 PROCEDURE — 83735 ASSAY OF MAGNESIUM: CPT | Performed by: PHYSICIAN ASSISTANT

## 2021-06-17 PROCEDURE — 250N000013 HC RX MED GY IP 250 OP 250 PS 637: Performed by: PHYSICIAN ASSISTANT

## 2021-06-17 PROCEDURE — 84100 ASSAY OF PHOSPHORUS: CPT | Performed by: PHYSICIAN ASSISTANT

## 2021-06-17 PROCEDURE — 80177 DRUG SCRN QUAN LEVETIRACETAM: CPT | Performed by: PHYSICIAN ASSISTANT

## 2021-06-17 PROCEDURE — 92526 ORAL FUNCTION THERAPY: CPT | Mod: GN | Performed by: SPEECH-LANGUAGE PATHOLOGIST

## 2021-06-17 PROCEDURE — 97110 THERAPEUTIC EXERCISES: CPT | Mod: GO

## 2021-06-17 PROCEDURE — 85027 COMPLETE CBC AUTOMATED: CPT | Performed by: PHYSICIAN ASSISTANT

## 2021-06-17 PROCEDURE — 36415 COLL VENOUS BLD VENIPUNCTURE: CPT | Performed by: PHYSICIAN ASSISTANT

## 2021-06-17 PROCEDURE — 80053 COMPREHEN METABOLIC PANEL: CPT | Performed by: PHYSICIAN ASSISTANT

## 2021-06-17 PROCEDURE — 97535 SELF CARE MNGMENT TRAINING: CPT | Mod: GO

## 2021-06-17 RX ADMIN — LINACLOTIDE 290 MCG: 145 CAPSULE, GELATIN COATED ORAL at 05:18

## 2021-06-17 RX ADMIN — LEVETIRACETAM 1250 MG: 750 TABLET, FILM COATED ORAL at 08:24

## 2021-06-17 RX ADMIN — FLUOXETINE HYDROCHLORIDE 60 MG: 20 CAPSULE ORAL at 08:25

## 2021-06-17 RX ADMIN — LEVETIRACETAM 1250 MG: 750 TABLET, FILM COATED ORAL at 20:20

## 2021-06-17 RX ADMIN — PANTOPRAZOLE SODIUM 40 MG: 40 TABLET, DELAYED RELEASE ORAL at 17:27

## 2021-06-17 RX ADMIN — FUROSEMIDE 40 MG: 40 TABLET ORAL at 08:24

## 2021-06-17 RX ADMIN — CALCIUM POLYCARBOPHIL 625 MG: 625 TABLET, FILM COATED ORAL at 08:24

## 2021-06-17 RX ADMIN — SULFAMETHOXAZOLE AND TRIMETHOPRIM 1 TABLET: 400; 80 TABLET ORAL at 08:24

## 2021-06-17 RX ADMIN — PANTOPRAZOLE SODIUM 40 MG: 40 TABLET, DELAYED RELEASE ORAL at 05:18

## 2021-06-17 RX ADMIN — BUSPIRONE HYDROCHLORIDE 30 MG: 15 TABLET ORAL at 20:20

## 2021-06-17 RX ADMIN — ENOXAPARIN SODIUM 40 MG: 40 INJECTION SUBCUTANEOUS at 20:20

## 2021-06-17 RX ADMIN — AMLODIPINE BESYLATE 5 MG: 5 TABLET ORAL at 08:24

## 2021-06-17 RX ADMIN — QUETIAPINE FUMARATE 25 MG: 25 TABLET ORAL at 20:22

## 2021-06-17 RX ADMIN — BUSPIRONE HYDROCHLORIDE 30 MG: 15 TABLET ORAL at 08:24

## 2021-06-17 NOTE — PROGRESS NOTES
"Brief Medicine Note    Medicine reviewed routine EEG from 6/16 without any seizure activity. There was mild abnormality due to slowing of background activity.     No appreciable seizure like activity witnessed by nursing/staff.     Today, patient worked with PT. PT notes reviewed and patient more alert and improved from past few days.    Upon assessment, patient reports she is doing well this morning but did not sleep well. She states she may have had a dream, and then needed to urinate. She denies any seizure like episodes. She notes that she had this \"activity,\" that kept her up but she couldn't describe this well. Feeling tired today as she didn't sleep. Denies back pain.    Labs were reviewed from today.    Today's vital signs, medications, and nursing notes were reviewed.     /83 (BP Location: Right arm)   Pulse 112   Temp 98.3  F (36.8  C) (Oral)   Resp 16   Wt 67.2 kg (148 lb 3.2 oz)   SpO2 94%   BMI 26.25 kg/m    General: A&O. NAD.   .nnpe      A/P:  # History of glioblastoma  # History of seizures  # Intermittent episodes of sedation:  There was question of possible absence seizure like episodes on 6/16 with Health Psychologist where she would intermittently close eyes and lick lips. This has not been witnessed by RN or writer. D/W Neurology on 6/16 and ordered routine EEG without seizure activity captured. No overnight witnessed recurrences. Another ddx would be just over sedation 2/2 medications: Keppra (increased dose during inpatient admission to 1250mg BID) vs Seroquel Discontinued Doxepin on 6/15, and discontinued daytime Seroquel and decreased PM dose from 50mg to 25mg on 6/16. No signs or symptoms to suggest infection. UA from 6/15 unremarkable. No fever/leukocytosis. Most recent brain MRI negative 4/26. Today patient reports not sleeping well but no focal neurological deficits on exam.  - RN to notify medicine with any sign of seizure activity: change in mental status, lick smacking, " jerking, change in neurological exam, etc.  - Check Keppra level in AM (12 hour trough) - pending  - Continue Seroquel 25mg at bedtime PRN for sleep (may need to adjust dose if continues to not sleep well)   - Discontinued Doxepin 3mg on 6/15   - seizure preacutions  - Continue Keppra 1250mg BID   - If any concern for change in mentation or seizure episodes would notify Neurology    Rabia López PA-C  Hospitalist Service  Pager [phone]

## 2021-06-17 NOTE — PLAN OF CARE
/64 (BP Location: Right arm)   Pulse 96   Temp 97  F (36.1  C) (Oral)   Resp 18   Wt 67.2 kg (148 lb 3.2 oz)   SpO2 96%   BMI 26.25 kg/m    Pt vitals stable. On room air, sats stable and denies SOB.  Denies pain, dizziness, nausea/vomiting or any discomfort. On Dd3 diet/nectar thick liquids. Swallows pills whole without difficulties. Incontinent of bladder. Uses pure wick at bedtime.Transfers with 1-2 assists with a walker and gait belt. Assisted with turning and repositioning in bed. Had a shower this evening. Pt declined to use hospital CPAP or oxygen at night. Sats stable on room air.  Pt is alert and oriented x4. Able to make needs known and using call light appropriately.  On seizure and fall precautions, bed alarm set for safety. No seizure activity noted this shift.

## 2021-06-17 NOTE — PLAN OF CARE
"Discharge Planner Post-Acute Rehab OT:     Discharge Plan: Pt reports plan remains to return to her daughter's home with intermittent supervision.  Pt also anticipates continued assistance with finances, medication management, meal prep, general IADL, and bathing.  Pt would need to be independent and safe with toileting and light meal prep.  Pt is progressing but as of tx on 6/17, anticipate she will still need some degree of assist with mobility and ADLs at discharge.     Precautions: falls    Current Status:  ADLs: CGA-Min A sit <> stand with FWW, CGA-Min A ambulation with FWW and w/c follow d/t fatigue, Mod A LB dressing from EOB but min A when reclined in bed, SBA-Min A UB dressing, SBA bed mobility when given extra time.   IADLs: NT  Vision/Cognition: Pt oriented with short term memory deficits present.  Pt reports some difficulty reading as well    Assessment: Much improved since this th worked with pt this past Sunday.  Focus of tx was transfer training.  Min A supine to sit, min a bed to w/c transfers.  When amb with walker in room, her posture is stooped but can improve with cues.  She needed only CGA with sit to stand out of w/c and followed cues for safe stand to sit at the recliner in her room.  Pt completed 8'45\" on the arm ergometer while seated and needed to sit up off the back of the chair to reach handles well.  She followed cues for sh stretch on table top and engaged in 2.5 to 4# dowel exercise.  She is working on UE ex and act tolerance to improve strength for ADLs and mobility.   Good effort and pt is appearing stronger today.  R sh con't to be limited to 70* sh flexion but WFLs for light ADLs.  Cont with POC, pt is goal directed and motivated.     Other Barriers to Discharge (DME, Family Training, etc): Safety, family training.    "

## 2021-06-17 NOTE — PLAN OF CARE
RN: Pt has no c/o pain or discomfort this shift. Pt has no c/o SOB and no s/s of respiratory issue noted at RA. Appear to be sleeping/resting between cares/ meds. Continue with plan of care.    Patient's most recent vital signs are:     Vital signs:  BP: 134/83  Temp: 98.3  HR: 112  RR: 16  SpO2: 94 %     Patient does not have new respiratory symptoms.  Patient does not have new sore throat.  Patient does not have a fever greater than 99.5.

## 2021-06-17 NOTE — PLAN OF CARE
Discharge Planner Post-Acute Rehab PT:      Discharge Plan: home with daughter's assist, not 24 hour is pt's goal     Precautions: fall, hx of recent seizures, swallowing     Current Status:  Bed Mobility: cga to min A, Mod assist to scoot hips  Transfer: cga to min A stand pivot with FWW  Gait: 130 feet with FWW cga and w/c follow for safety  Stairs: TBA  Balance: cga to occasional min A when fatigued.     Assessment: Pt more alert this session. Improved from past few days of PT. Still tends to lean post with sitting and standing activity.    Other Barriers to Discharge (DME, Family Training, etc): functional tolerance, medical complexity, functional status, neuro involvement

## 2021-06-17 NOTE — PLAN OF CARE
"Discharge Planner Post-Acute Rehab SLP:      Discharge Plan: tbd     Precautions: Dysphagia     Current Status:  Communication: Min difficulties with conversational tasks (word finding) and able to follow directions without difficulties.  Cognition: mild deficits at least since GBM resection 2/2021, had been seen prior admissions/OP, probable \"plateau\" as abilities appear unchanged, likely functional for needs.   Swallow: Dysphagia diet 3 and thin liquids per video swallow completed 6/11.     Assessment: Patient seen at meal to assess diet tolerance on dysphagia level 3 with thin liquids. Patient noted to have straw in water cup upon SLP arrival. Removed straw from cup and highlighted \"no straws\" instructions on communication board. Note one instance of mild throat clear with mixed consistency (canned peach with juice) but no other throat clearing and no other overt aspiration signs occurred. Note slow but functional mastication of solid texture and no oral residue remained. Provided further education about swallow strategies and diet modifications. Current diet level appears appropriate given slow mastication.      Recommend continue dysphagia diet 3 and thin liquids. Pt to sit up for po, small bites and sips, no straw, slow pace.      Other Barriers to Discharge (Family Training, etc): tbd    "

## 2021-06-18 ENCOUNTER — APPOINTMENT (OUTPATIENT)
Dept: PHYSICAL THERAPY | Facility: SKILLED NURSING FACILITY | Age: 76
End: 2021-06-18
Payer: MEDICARE

## 2021-06-18 ENCOUNTER — APPOINTMENT (OUTPATIENT)
Dept: OCCUPATIONAL THERAPY | Facility: SKILLED NURSING FACILITY | Age: 76
End: 2021-06-18
Payer: MEDICARE

## 2021-06-18 ENCOUNTER — TELEPHONE (OUTPATIENT)
Dept: PHARMACY | Facility: CLINIC | Age: 76
End: 2021-06-18

## 2021-06-18 ENCOUNTER — APPOINTMENT (OUTPATIENT)
Dept: SPEECH THERAPY | Facility: SKILLED NURSING FACILITY | Age: 76
End: 2021-06-18
Payer: MEDICARE

## 2021-06-18 LAB
LEVETIRACETAM SERPL-MCNC: 45 UG/ML (ref 12–46)
POTASSIUM SERPL-SCNC: 3.7 MMOL/L (ref 3.4–5.3)

## 2021-06-18 PROCEDURE — 99309 SBSQ NF CARE MODERATE MDM 30: CPT | Performed by: PHYSICIAN ASSISTANT

## 2021-06-18 PROCEDURE — 97535 SELF CARE MNGMENT TRAINING: CPT | Mod: GO

## 2021-06-18 PROCEDURE — 97116 GAIT TRAINING THERAPY: CPT | Mod: GP

## 2021-06-18 PROCEDURE — 99207 PR CDG-CODE CATEGORY CHANGED: CPT | Performed by: PHYSICIAN ASSISTANT

## 2021-06-18 PROCEDURE — 120N000009 HC R&B SNF

## 2021-06-18 PROCEDURE — 97110 THERAPEUTIC EXERCISES: CPT | Mod: GP

## 2021-06-18 PROCEDURE — 97530 THERAPEUTIC ACTIVITIES: CPT | Mod: GP

## 2021-06-18 PROCEDURE — 250N000013 HC RX MED GY IP 250 OP 250 PS 637: Performed by: PHYSICIAN ASSISTANT

## 2021-06-18 PROCEDURE — 92526 ORAL FUNCTION THERAPY: CPT | Mod: GN | Performed by: SPEECH-LANGUAGE PATHOLOGIST

## 2021-06-18 PROCEDURE — 84132 ASSAY OF SERUM POTASSIUM: CPT | Performed by: INTERNAL MEDICINE

## 2021-06-18 PROCEDURE — 36415 COLL VENOUS BLD VENIPUNCTURE: CPT | Performed by: INTERNAL MEDICINE

## 2021-06-18 PROCEDURE — 97110 THERAPEUTIC EXERCISES: CPT | Mod: GO

## 2021-06-18 PROCEDURE — 250N000011 HC RX IP 250 OP 636: Performed by: PHYSICIAN ASSISTANT

## 2021-06-18 RX ADMIN — PANTOPRAZOLE SODIUM 40 MG: 40 TABLET, DELAYED RELEASE ORAL at 06:42

## 2021-06-18 RX ADMIN — LEVETIRACETAM 1250 MG: 750 TABLET, FILM COATED ORAL at 09:33

## 2021-06-18 RX ADMIN — FUROSEMIDE 40 MG: 40 TABLET ORAL at 09:34

## 2021-06-18 RX ADMIN — FLUOXETINE HYDROCHLORIDE 60 MG: 20 CAPSULE ORAL at 09:33

## 2021-06-18 RX ADMIN — CALCIUM POLYCARBOPHIL 625 MG: 625 TABLET, FILM COATED ORAL at 09:33

## 2021-06-18 RX ADMIN — LEVETIRACETAM 1250 MG: 750 TABLET, FILM COATED ORAL at 21:07

## 2021-06-18 RX ADMIN — LINACLOTIDE 290 MCG: 145 CAPSULE, GELATIN COATED ORAL at 06:42

## 2021-06-18 RX ADMIN — BUSPIRONE HYDROCHLORIDE 30 MG: 15 TABLET ORAL at 21:07

## 2021-06-18 RX ADMIN — ACETAMINOPHEN 650 MG: 325 TABLET, FILM COATED ORAL at 13:05

## 2021-06-18 RX ADMIN — BUSPIRONE HYDROCHLORIDE 30 MG: 15 TABLET ORAL at 09:33

## 2021-06-18 RX ADMIN — AMLODIPINE BESYLATE 5 MG: 5 TABLET ORAL at 09:34

## 2021-06-18 RX ADMIN — QUETIAPINE FUMARATE 25 MG: 25 TABLET ORAL at 21:07

## 2021-06-18 RX ADMIN — ENOXAPARIN SODIUM 40 MG: 40 INJECTION SUBCUTANEOUS at 21:07

## 2021-06-18 RX ADMIN — PANTOPRAZOLE SODIUM 40 MG: 40 TABLET, DELAYED RELEASE ORAL at 16:33

## 2021-06-18 RX ADMIN — SULFAMETHOXAZOLE AND TRIMETHOPRIM 1 TABLET: 400; 80 TABLET ORAL at 09:33

## 2021-06-18 NOTE — PLAN OF CARE
Pt alert and oriented. Able to make needs known. Denies pain, cp or SOB. Slept well throughout the night. Pure wick intact w/ adequate output. Seizure precautions maintained. Call light in reach, continue w/ POC.      Patient's most recent vital signs are:     Vital signs:  BP: 125/70  Temp: 98.3  HR: 106  RR: 16  SpO2: 92 %     Patient does not have new respiratory symptoms.  Patient does not have new sore throat.  Patient does not have a fever greater than 99.5.

## 2021-06-18 NOTE — ORAL ONC MGMT
Oral Chemotherapy Monitoring Program    Call placed to [facility] re: temozolomide for Olga Bailey. Nurse answered and stated she would connect with manager to determine best person to have temozolomide teaching. She stated they would call the [facility] for teaching. Writer requested to have a differential added to next CBC for patient's baseline labs and confirmed their address for delivery of temozolomide. Nurse in agreement.    Call placed to daughter to inquire if there were any questions. La Nena stated she still had her notes from the original teach on temozolomide and did not have any questions at this time. Reiterated that plan is for Olga to start temozolomide on 6/22 after her appt with Dr. Baker. Daughter verbalized understanding and said she would reach out with any questions.     Will follow-up with lab results on Monday.    Yvette Vargas, Pharm. D., BCOP

## 2021-06-18 NOTE — PLAN OF CARE
Patient's most recent vital signs are:     Vital signs:  BP: 125/70  Temp: 98.3  HR: 106  RR: 16  SpO2: 92 %     Patient does not have new respiratory symptoms.  Patient does not have new sore throat.  Patient does not have a fever greater than 99.5.    Alert and very talkative and pleasant today and this evening. Transferring better this evening and able to bear weight much better. More steady. Up in chair for meals. DD3 diet with thin liquids. Appetite is fair. Continent/incontinent of urine. Purewick at HS. Some redness noted inner groin areas. Barrier cream applied. Talked with daughter on phone this evening.No complaints of pain.

## 2021-06-18 NOTE — PLAN OF CARE
Alert and oriented, VSS. Reported mild HA this afternoon, PRN tylenol given. No SOB. Appetite fair, encouraged fluid intake. No skin concerns noted. Purewick removed during the day. Incontinent of BM x1. Per RN potassium protocol, K lab drawn: 3.7. Protocol on Epic states that a height or weight is needed, but still unable to proceed w/ protocol when height entered. Will need weight obtained this PM.     Patient's most recent vital signs are:     Vital signs:  BP: 115/69  Temp: 97  HR: 104  RR: 16  SpO2: 93 %     Patient does not have new respiratory symptoms.  Patient does not have new sore throat.  Patient does not have a fever greater than 99.5.

## 2021-06-18 NOTE — PROGRESS NOTES
Care conference scheduled for Tuesday, 6/22 at 9:30am.    SW created Teams meeting and sent invitations. Pt's daughter, La Nena, and two sons, Dario and Sudheer, to join.     Phone #: [phone]  Phone Conference ID: [phone]    Frederic SORTO, [facility]  TCU   Phone: [phone]

## 2021-06-18 NOTE — PLAN OF CARE
Discharge Planner Post-Acute Rehab OT:     Discharge Plan: Pt reports plan remains to return to her daughter's home with intermittent supervision.  Pt also anticipates continued assistance with finances, medication management, meal prep, general IADL, and bathing.  Pt would need to be independent and safe with toileting and light meal prep.  Pt is progressing but as of tx on 6/17, anticipate she will still need some degree of assist with mobility and ADLs at discharge.     Precautions: falls    Current Status:  ADLs: CGA-Min A sit <> stand with FWW, CGA-Min A ambulation with FWW and w/c follow d/t fatigue, Mod A LB dressing from EOB but min A when reclined in bed, SBA-Min A UB dressing, SBA bed mobility when given extra time.   IADLs: NT  Vision/Cognition: Pt oriented with short term memory deficits present.  Pt reports some difficulty reading as well    Assessment:  Min a supine to sit and then cues and up to min A when doffing gown/donning shirt while sitting unsupported EOB, pt falls backwards and to the R even after her feet are planted on the floor and she has been leaning forward/centering herself.  Pt amb with 2WW bed to toilet with min A and safety cues.  She con't to demo forward posture, needs cues to correct and cues to keep walker in front of her when turning to sit.  Pt needed mod A with toilet hygiene but able to pull pants up/down with CGA.  Pt stood at the sink to wash her hands and additional transfers in and out of w/c and recliner con't to be min A for safety.  Good effort and motivation from pt.during ADL training.  Th led pt through BUE stretch and light ex.  Pt followed cues well and demo increase ease with RUE AROM though range con't to be limited to less than 90* sh flex actively.  RUE sh ROM is WNLs when doing PROM.  Pt is challenged with tasks such as UB dressing, getting shirt over head/pulling into place, it takes extra time and effort due to weakness but pt is progressing evidenced by  decrease assist with UB dressing and improving mobility.  Cont with POC, pt is goal directed and motivated.     Other Barriers to Discharge (DME, Family Training, etc): Safety, family training.

## 2021-06-18 NOTE — PLAN OF CARE
"Discharge Planner Post-Acute Rehab SLP:      Discharge Plan: tbd     Precautions: Dysphagia     Current Status:  Communication: Min difficulties with conversational tasks (word finding) and able to follow directions without difficulties.  Cognition: mild deficits at least since GBM resection 2/2021, had been seen prior admissions/OP, probable \"plateau\" as abilities appear unchanged, likely functional for needs.   Swallow: Dysphagia diet 3 and thin liquids per video swallow completed 6/11.     Assessment: Patient completed PO trials of regular texture solids (fresh fruit cup, reno cracker) and other NDD3 textures with thin liquids at noon meal. No overt s/s of aspiration observed. Patient oral phase impacted by generalized weakness, resulting in prolonged mastication time, but functional bolus breakdown and clearance. Pharyngeal phase appears WFL at the bedside. Patient consistently implemented compensatory swallow strategies of small bites/sips, and slow rate of intake independently. Patient verbalized that she would feel comfortable with an upgrade to regular texture solids after trying a full meal trial. Education provided to patient verbally regarding role of SLP in swallow, goals for patient, current diet level, and plans for potential upgrade to regular textures implementing compensatory swallow strategies. Patient verbalized understanding and agreement of education. Continue NDD3 textures and thin liquids without a straw.     Other Barriers to Discharge (Family Training, etc): tbd    "

## 2021-06-18 NOTE — PLAN OF CARE
Discharge Planner Post-Acute Rehab PT:      Discharge Plan: home with daughter's assist, not 24 hour is pt's goal     Precautions: fall, hx of recent seizures, swallowing     Current Status:  Bed Mobility: cga to min A, Mod assist to scoot hips  Transfer: cga to min A stand pivot with FWW  Gait: 130 feet with FWW cga and w/c follow for safety  Stairs: TBA  Balance: cga to occasional min A when fatigued.     Assessment: Pt more alert again this session. Pt able to amb room>PT gym using ww Gerard, w/c follow. STS CGA using ww. Continues to need cues for hand placement with transfers.     Other Barriers to Discharge (DME, Family Training, etc): functional tolerance, medical complexity, functional status, neuro involvement

## 2021-06-18 NOTE — PROGRESS NOTES
[facility] Transitional Care Unit  Internal Medicine Progress Note    TCU Day # 13    Assessment & Plan: Olga Bailey is a 75 year old female with a history of glioblastoma multiforme s/p resection (2/23/21), HTN, GERD, asthma, anxiety and depression. She was initially admitted to [facility] from 4/16-5/15 for acute hypoxic respiratory failure 2/2 PJP pneumonia. She was discharged to  TCU but required readmission to [facility] on 5/26 for seizure activity. During her hospitalization, her Keppra dose was increased, steroids were started and she reamined seizure free. She was transferred back to  TCU on 6/5 for ongoing rehabilitation.    # Glioblastoma multiforme s/p resection (2/23/21)  # Seizures   S/p resection in 2/23/21. Follows with Dr. Baker, Oncology as well as Dr. Spann with Radiation Oncology. Completed 15/15 sessions of radiation on 5/27. Heme/Onc initially concerned she may not be candidate for chemotherapy due to poor performance status. Had breakthrough seizure 5/26 prompting inpatient admission. She was started on Dexamethasone taper (completed 6/9), and Keppra dose was increased to 1250mg BID. On 6/16, there was concern for possible recurrent seizure like activity as Health Psychologist reported witnessing the patient have 3 episodes where her eyes closed and she licked her lips during their 10 minute conversation. This had not been witnessed by staff prior to this. Discussed with Neurology and patient had routine EEG on 6/16 without epileptiform discharges or seizures but with some mild abnormal slowing of background activity. During this period of time, Medicine noticed patient being more sedated, therefore Doxepin discontinued 6/15 and Seroquel 25mg BID discontinued and evening dose decreased from 50mg to 25mg. Now mentation much improved. No recurrent episodes as discussed. Suspect sedation was culprit. Keppra level 45 today. 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   - Seizure precautions  - s/p Dexamethasone taper (completed 6/9)  - Continue increased dose of Keppra 1250mg BID  - Follow-up with Dr. Baker as scheduled at [facility] on 6/22 with MRI Brain, Labs and Clinic visit   - Follow-up with Radiation Oncology Dr. Spann for virtual visit oin 6/24  - PT/OT   - RN to notify medicine with any sign of seizure activity: change in mental status, lick smacking, jerking, change in neurological exam, etc. And would discuss with Neurology  - Addendum: Notified that patient will start Temodar after patient's Oncology visit with Dr. Baker on 6/22. Will need Zofran pre-scheduled 30-60mg prior to Temodar dosing to prevent chemotherapy induced N/V. Will await orders from Dr. Baker following appointment. Trend CBC with diff on 6/21 per Dr. Baker.     # Intermittent right sided headache-  Patient reports since her brain tumor resection, she will get brief, mild headaches on the right side of her head near temporal region. No tenderness over the temporal artery. No change in vision or jaw claudication. No rhinorrhea or increased tears to eyes. MRI Brain recently unremarkable. Goes away without intervention. No focal neurological deficits on exam.  - Monitor, please notify medicine if any new or unusual headache or change in mental status    # MDD  # [place] -  Follows with Psychiatry and Psychology as outpatient. Seeing Health Psychology during recent admission and TCU stay. Was started on Seroquel 25mg BID + 50mg at bedtime and Doxepin 3mg at bedtime in 4/2021 for anxiety/insomnia, which anxiety has now improved. Given concern for daytime sedation, discontinued Doxepin on 6/15 and discontinued Seroquel 25mg BID scheduled on 6/17 and decreased bedtime Seroquel to 25mg which significant improvement.   - Health Psychology following weekly at TCU (last seen 6/16)  - Continue PTA Buspar 30 mg BID  - Continue PTA Prozac 60 mg daily  - Continue Seroquel  25mg at bedtime   - Discontinued PTA Doxepin 3mg at bedtime due to sedation (6/15)      # Bilateral lower extremity DVT s/p IVC filter (4/16/21)  # History of right retroperitoneal hematoma (4/14/21) -  Ultrasound 3/29 revealed BLE DVTs. CT negative for PE. Started on IV heparin, transitioned to Lovenox 70 mg. On 4/14 developed spontaneous retroperitoneal bleed requiring 4 units PRBCs. IVC filter placed 4/16. Vascular surgery consulted at that time - and repeat CT with stable area of bleeding, therefore no need for surgical intervention. Eventually resumed on Lovenox 40 mg daily for prophylaxis.   - Continue Lovenox subcutaneous 40 mg daily for DVT ppx   - CBC qMTh  - Continue IVC filter  - Follow up with Heme/Onc as discussed on 6/22  - Addendum: Staff message sent to Dr. Baker to assess need for prophylactic anticoagulation at discharge vs OK for just IVC     # Dysphagia:  SLP following. Had VFSS 4/21 and 5/26 showing silent aspiration. Repeat study 6/11 with no aspiration or flash penetration. Progressed to DD2 diet w/ thin liquids 6/12 and DD3 diet with thin liquids on 6/17.   - SLP following  - DD3 with thin liquids    # Slow transit constipation:  Having regular bowel movements. No acute concerns.  - Continue PTA Linzess, Miralax and Simethicone  - Continue PTA fiber supplementation daily     # Anemia:  Hgb 10.9, MCV 95. At baseline. No s/sx of bleeding.  - Trend CBC qM/Th    # Goals of care:  Initial plan was for patient to discharge to daughter's home with home cares. However, daughter La Nena expressed concerns regarding having enough support at home for this, as well as her home having stairs. CAMRON discussed with daughter La Nena and plan is for care conference Tuesday 6/22 at 9:30AM. May need to consider LTC facility pending course.    Stable medical issues:  # GERD: Continue PTA PPI BID  # Mild intermittent asthma: Continue Albuterol PRN  # HTN: Continue PTA Amlodipine 5mg daily and Lasix 40mg daily with  "hold parameters    Resolved problems:  # Sedation: Had concerns for increased intermittent daytime sleepiness on 6/15-6/16. Improved with medication adjustments: Discontinuing Doxepin, discontinuing daytime Seroquel and decreasing Seroquel dose at bedtime from 50mg to 25mg. No infectious s/sx, UA negative. EEG negative for seizures on 6/16. No focal neurological deficits. Mentation at baseline. Monitor.  # Recent PJP pneumonia: Had admission for [facility] in 4/2021 for PJP pneumonia. Now on bactrim once daily. Continue.    # Recent UTI: Completed course of oral cefdinir on 5/31.   # Urinary retention: Required intermittent straight caths likely 2/2 UTI, now voiding spontaneously.  # Hypokalemia, resolved: K 3.0 on 6/14, s/p replacement protocol with improvement to 4.1. K WNL today. Trend BMP qM/Th  # Acute low back pain, resolved:   Patient reported acute low back pain x2 days on 6/13 - reports this is due to \"overdoing it,\" with therapy on 6/12. On exam, there was reproducible tenderness midline of lower L spine. No paraspinal muscle ttp. No radicular symptoms. Strength 5/5 in BLE, sensation intact and reflexes symmetric and intact. No history of trauma or fall. No loss of bowel/bladder function or saddle anesthesia. Pain improved with rest and supportive cares. Monitor.      Consulting Services: None    CODE: Full Code   DVT: Lovenox subcutaneous prophylaxis   Diet: DD3 with thin liquids  Indications for Psychotropic Medications: Buspar, Prozac, Seroquel at lowest effective dose for MDD and [place].   Pneumococcal Vaccination Status: Up-to-date  Disposition: 6/28 to daughter's house vs LTC once PT/OT and SW plan in place and care conference.     Rabia López PA-C  Hospitalist Service  Pager: [phone]  ________________________________________________________________    Subjective & Interval History:      No overnight events. Patient reports she is doing well this morning. Denies headache, paresthesias, change in " vision, chest pain, SOB, abdominal pain, N/V. No witnessed seizure activity per staff. Patient denies dysuria, frequency or urgency. Last BM today.     Last 24 hour care team notes reviewed.   ROS: 4 point ROS (including Respiratory, CV, GI and ) was performed and negative unless otherwise noted in HPI.     Medications: Reviewed in EPIC.    Physical Exam:    Blood pressure 121/86, pulse 100, temperature 98.5  F (36.9  C), temperature source Oral, resp. rate 18, weight 67.2 kg (148 lb 3.2 oz), SpO2 93 %.    GENERAL: Alert and oriented x 3. No acute distress. Pleasant. Answering questions appropriately.   HEENT: Anicteric sclera. Mucous membranes moist. PERRLA. No facial asymmetry. Speech normal.   CV: RRR. S1, S2. No murmurs appreciated.   RESPIRATORY: Effort normal on room air. Lungs CTAB with no wheezing, rales, rhonchi.   GI: Abdomen soft and non distended, bowel sounds present. No tenderness, rebound, guarding.   NEUROLOGICAL: No focal deficits. Moves all extremities. 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 CN III-XII grossly intact.   EXTREMITIES: No peripheral edema. Intact bilateral pedal pulses.   SKIN: No jaundice. No rashes. Mild superficial ecchymosis to bilateral lower legs and right arm    Lines/Tubes/Drains:    N/A

## 2021-06-19 ENCOUNTER — APPOINTMENT (OUTPATIENT)
Dept: OCCUPATIONAL THERAPY | Facility: SKILLED NURSING FACILITY | Age: 76
End: 2021-06-19
Payer: MEDICARE

## 2021-06-19 LAB — POTASSIUM SERPL-SCNC: 3.4 MMOL/L (ref 3.4–5.3)

## 2021-06-19 PROCEDURE — 250N000011 HC RX IP 250 OP 636: Performed by: PHYSICIAN ASSISTANT

## 2021-06-19 PROCEDURE — 84132 ASSAY OF SERUM POTASSIUM: CPT | Performed by: INTERNAL MEDICINE

## 2021-06-19 PROCEDURE — 36415 COLL VENOUS BLD VENIPUNCTURE: CPT | Performed by: INTERNAL MEDICINE

## 2021-06-19 PROCEDURE — 120N000009 HC R&B SNF

## 2021-06-19 PROCEDURE — 250N000013 HC RX MED GY IP 250 OP 250 PS 637: Performed by: INTERNAL MEDICINE

## 2021-06-19 PROCEDURE — 250N000013 HC RX MED GY IP 250 OP 250 PS 637: Performed by: PHYSICIAN ASSISTANT

## 2021-06-19 PROCEDURE — 97535 SELF CARE MNGMENT TRAINING: CPT | Mod: GO

## 2021-06-19 PROCEDURE — 97110 THERAPEUTIC EXERCISES: CPT | Mod: GO

## 2021-06-19 RX ORDER — POTASSIUM CHLORIDE 750 MG/1
40 TABLET, EXTENDED RELEASE ORAL ONCE
Status: COMPLETED | OUTPATIENT
Start: 2021-06-19 | End: 2021-06-19

## 2021-06-19 RX ADMIN — LEVETIRACETAM 1250 MG: 750 TABLET, FILM COATED ORAL at 08:06

## 2021-06-19 RX ADMIN — BUSPIRONE HYDROCHLORIDE 30 MG: 15 TABLET ORAL at 08:06

## 2021-06-19 RX ADMIN — CALCIUM POLYCARBOPHIL 625 MG: 625 TABLET, FILM COATED ORAL at 08:06

## 2021-06-19 RX ADMIN — QUETIAPINE FUMARATE 25 MG: 25 TABLET ORAL at 21:02

## 2021-06-19 RX ADMIN — PANTOPRAZOLE SODIUM 40 MG: 40 TABLET, DELAYED RELEASE ORAL at 08:06

## 2021-06-19 RX ADMIN — SULFAMETHOXAZOLE AND TRIMETHOPRIM 1 TABLET: 400; 80 TABLET ORAL at 08:06

## 2021-06-19 RX ADMIN — LINACLOTIDE 290 MCG: 145 CAPSULE, GELATIN COATED ORAL at 08:06

## 2021-06-19 RX ADMIN — LEVETIRACETAM 1250 MG: 750 TABLET, FILM COATED ORAL at 21:01

## 2021-06-19 RX ADMIN — PANTOPRAZOLE SODIUM 40 MG: 40 TABLET, DELAYED RELEASE ORAL at 15:53

## 2021-06-19 RX ADMIN — BUSPIRONE HYDROCHLORIDE 30 MG: 15 TABLET ORAL at 21:01

## 2021-06-19 RX ADMIN — FLUOXETINE HYDROCHLORIDE 60 MG: 20 CAPSULE ORAL at 08:06

## 2021-06-19 RX ADMIN — AMLODIPINE BESYLATE 5 MG: 5 TABLET ORAL at 08:06

## 2021-06-19 RX ADMIN — FUROSEMIDE 40 MG: 40 TABLET ORAL at 08:06

## 2021-06-19 RX ADMIN — POTASSIUM CHLORIDE 40 MEQ: 750 TABLET, EXTENDED RELEASE ORAL at 18:26

## 2021-06-19 RX ADMIN — ENOXAPARIN SODIUM 40 MG: 40 INJECTION SUBCUTANEOUS at 21:01

## 2021-06-19 ASSESSMENT — MIFFLIN-ST. JEOR: SCORE: 1152.56

## 2021-06-19 NOTE — PLAN OF CARE
Discharge Planner Post-Acute Rehab OT:      Discharge Plan: Pt reports plan remains to return to her daughter's home with intermittent supervision.  Pt also anticipates continued assistance with finances, medication management, meal prep, general IADL, and bathing.  Pt would need to be independent and safe with toileting and light meal prep.  Pt is progressing but as of tx on 6/17, anticipate she will still need some degree of assist with mobility and ADLs at discharge.      Precautions: falls     Current Status:  ADLs: CGA-Min A sit <> stand with FWW, CGA-Min A ambulation with FWW and w/c follow d/t fatigue, Mod A LB dressing from EOB but min A when reclined in bed, SBA-Min A UB dressing, SBA bed mobility when given extra time.   IADLs: NT  Vision/Cognition: Pt oriented with short term memory deficits present.  Pt reports some difficulty reading as well     Assessment:  focused on LB dressing at start of tx session. Pt doff underbrief Min A, don new underbrief SBA sitting up in bed with extra time needed.  Pt determined to don socks without adaptive equipment, extra time to problem solve, and effortful for pt, was able to eventually don socks Min A for assist with RLE cross over.  Encouraged pt to continue working RLE cross over stretch as able. Pt Mod A STS from EOB to fww, CGA ambulate to w/c in room. At end of tx pt CGA STS from w/c and ambulate w/fww to chair in room. Cont per POC.     Other Barriers to Discharge (DME, Family Training, etc): Safety, family training.

## 2021-06-19 NOTE — PLAN OF CARE
Pt is alert and oriented x 4. Denies chest pain, SOB, headache, nausea or abdominal discomfort. Denies pain. Generalized bruised all extremities. No patch. LBM 06/18/2021. purewick on night time. DD3 diet with good appetite. A of 1 with pivot transfer. On chair most of the evening shift. Seizure precaution maintained. BLE edema, pillow under leg. Call light within reach. Continue with POC.    Patient's most recent vital signs are:     Vital signs:  BP: 115/69  Temp: 97  HR: 104  RR: 16  SpO2: 93 %     Patient does not have new respiratory symptoms.  Patient does not have new sore throat.  Patient does not have a fever greater than 99.5.

## 2021-06-19 NOTE — PLAN OF CARE
Pt is alert and oriented x 4. VSS. Denies chest pain, SOB, headache, nausea, or abdominal discomfort. Denies pain. LBM 06/19/2021, purewick on. Bath by CNA at evening shift. Seizure precaution maintained. Potassium level 3.4, potassium replacement completed and pt declined recheck blood at 1030 PM (Set lab draw for tomorrow morning). A of 1 transfer with walker. Call light within reach. Continue with POC.    Patient's most recent vital signs are:     Vital signs:  BP: 119/73  Temp: 97.7  HR: 91  RR: 17  SpO2: 93 %     Patient does not have new respiratory symptoms.  Patient does not have new sore throat.  Patient does not have a fever greater than 99.5.

## 2021-06-19 NOTE — PROGRESS NOTES
Pt alert and oriented. Pt denies SOB, Chest pain, dizziness, lightheadedness and nausea and vomiting. Denies pain this shift. Slept most of the night during rounds. DD3 diet. AX 1 with pivot transfer. LBM 6/18/21, Passing flatus. Purewick in place for Urine output. Seizure precaution maintained. Call light within reach. Able to make needs known. Continue with plan of care.

## 2021-06-19 NOTE — PLAN OF CARE
Alert and oriented, VSS. Jose pain, no SOB. Appetite good, promoted fluid intake. No skin concerns noted ex generalized bruising. 1+ edema BLE. Voiding via purewick. LBM 6/19. Weight obtained for RN K replacement protocol, awaiting lab result. Son visiting this afternoon.     Patient's most recent vital signs are:     Vital signs:  BP: 119/73  Temp: 97.7  HR: 91  RR: 17  SpO2: 93 %     Patient does not have new respiratory symptoms.  Patient does not have new sore throat.  Patient does not have a fever greater than 99.5.

## 2021-06-19 NOTE — PLAN OF CARE
PTA: -45 min this am. Pt sleeping upon PTA entry...able to awaken however sleepy and groggy. Pt requesting to be seen later if able. Will re-attempt as schedule allows. Otherwise will plan to reschedule for tomorrow.

## 2021-06-20 ENCOUNTER — APPOINTMENT (OUTPATIENT)
Dept: PHYSICAL THERAPY | Facility: SKILLED NURSING FACILITY | Age: 76
End: 2021-06-20
Payer: MEDICARE

## 2021-06-20 LAB
PLATELET # BLD AUTO: 272 10E9/L (ref 150–450)
POTASSIUM SERPL-SCNC: 3.5 MMOL/L (ref 3.4–5.3)

## 2021-06-20 PROCEDURE — 250N000013 HC RX MED GY IP 250 OP 250 PS 637: Performed by: PHYSICIAN ASSISTANT

## 2021-06-20 PROCEDURE — 120N000009 HC R&B SNF

## 2021-06-20 PROCEDURE — 85049 AUTOMATED PLATELET COUNT: CPT | Performed by: PHYSICIAN ASSISTANT

## 2021-06-20 PROCEDURE — 36415 COLL VENOUS BLD VENIPUNCTURE: CPT | Performed by: PHYSICIAN ASSISTANT

## 2021-06-20 PROCEDURE — 97530 THERAPEUTIC ACTIVITIES: CPT | Mod: GP

## 2021-06-20 PROCEDURE — 97116 GAIT TRAINING THERAPY: CPT | Mod: GP

## 2021-06-20 PROCEDURE — 250N000011 HC RX IP 250 OP 636: Performed by: PHYSICIAN ASSISTANT

## 2021-06-20 PROCEDURE — 84132 ASSAY OF SERUM POTASSIUM: CPT | Performed by: PHYSICIAN ASSISTANT

## 2021-06-20 RX ADMIN — LEVETIRACETAM 1250 MG: 750 TABLET, FILM COATED ORAL at 20:53

## 2021-06-20 RX ADMIN — FLUOXETINE HYDROCHLORIDE 60 MG: 20 CAPSULE ORAL at 08:28

## 2021-06-20 RX ADMIN — PANTOPRAZOLE SODIUM 40 MG: 40 TABLET, DELAYED RELEASE ORAL at 16:08

## 2021-06-20 RX ADMIN — QUETIAPINE FUMARATE 25 MG: 25 TABLET ORAL at 21:00

## 2021-06-20 RX ADMIN — FUROSEMIDE 40 MG: 40 TABLET ORAL at 08:28

## 2021-06-20 RX ADMIN — PANTOPRAZOLE SODIUM 40 MG: 40 TABLET, DELAYED RELEASE ORAL at 06:12

## 2021-06-20 RX ADMIN — SULFAMETHOXAZOLE AND TRIMETHOPRIM 1 TABLET: 400; 80 TABLET ORAL at 08:28

## 2021-06-20 RX ADMIN — BUSPIRONE HYDROCHLORIDE 30 MG: 15 TABLET ORAL at 20:53

## 2021-06-20 RX ADMIN — LINACLOTIDE 290 MCG: 145 CAPSULE, GELATIN COATED ORAL at 06:12

## 2021-06-20 RX ADMIN — CALCIUM POLYCARBOPHIL 625 MG: 625 TABLET, FILM COATED ORAL at 08:28

## 2021-06-20 RX ADMIN — LEVETIRACETAM 1250 MG: 750 TABLET, FILM COATED ORAL at 08:28

## 2021-06-20 RX ADMIN — AMLODIPINE BESYLATE 5 MG: 5 TABLET ORAL at 08:28

## 2021-06-20 RX ADMIN — ENOXAPARIN SODIUM 40 MG: 40 INJECTION SUBCUTANEOUS at 20:53

## 2021-06-20 RX ADMIN — BUSPIRONE HYDROCHLORIDE 30 MG: 15 TABLET ORAL at 08:29

## 2021-06-20 NOTE — PLAN OF CARE
Pt is alert and oriented x 4. Denies chest pain, SOB, headache, nausea, or abdominal discomfort. Denies pain. A of 1 with walker and gait belt. LBM 06/20/2021. Purewick on. Dd3 with thin liquid diet, good appetite. Seizure precaution maintained. L shin wound dressing CDI. Generalized bruised on forearms and Shins. Call light within reach. Continue with POC.    Patient's most recent vital signs are:     Vital signs:  BP: 111/76  Temp: 96  HR: 102  RR: 16  SpO2: 94 %     Patient does not have new respiratory symptoms.  Patient does not have new sore throat.  Patient does not have a fever greater than 99.5.

## 2021-06-20 NOTE — PLAN OF CARE
Discharge Planner Post-Acute Rehab PT:      Discharge Plan: home with daughter's assist, not 24 hour is pt's goal     Precautions: fall, hx of recent seizures, swallowing     Current Status:  Bed Mobility: cga to min A, Mod assist to scoot hips  Transfer: cga to min A stand pivot with FWW  Gait: 130 feet with FWW cga and w/c follow for safety  Stairs: TBA  Balance: cga to occasional min A when fatigued.     Assessment: Pt more alert again this session. Pt able to amb room>PT gym using ww Gerard, w/c follow. STS CGA using ww. Continues to need cues for hand placement, base of support and head position with transfers to decrease dominant L Lean.  Ambulated 50 feet x 2 with FWW, cga to min A cues to decrease L lean.     Skin tear noted on anterior lower leg at the beginning of the session (unknown mechanism prior to PT session) RN assisted pt post.       Other Barriers to Discharge (DME, Family Training, etc): functional tolerance, medical complexity, functional status, neuro involvement

## 2021-06-20 NOTE — PLAN OF CARE
"A&O x4. Pt denied pain, nausea, SOB or any symptoms/concerns requiring intervention overnight. When asked about any concerns pt stated \"I'm doing just perfect!\" Pt slept majority of shift. Call light within reach and pt able to make needs known.   "

## 2021-06-20 NOTE — PROGRESS NOTES
NEURO-ONCOLOGY VISIT  Jun 22, 2021    CHIEF COMPLAINT: Ms. Olga Bailey is a 75 year old right-handed woman with a left frontoparietal glioblastoma (IDH wild-type, MGMT promoter methylated), diagnosed following gross total resection on 2/23/2021. Initiation of upfront cancer-directed treatment was delayed due to a complicated hospitalization. Given a resolving infection and lowered functional status, radiation alone was initiated on 5/4/2021 and will be completed on 5/27/2021. The plan is to initiate adjuvant temozolomide.     Accompanying her to this visit is Rita (daughter).     HISTORY OF PRESENT ILLNESS  -Olga was admitted in the end of May for a recurrent seizure. Dexamethasone restarted and has since been tapered off. Tolerated taper.   -She is residing in rehab and again, clinically much improved as compared to a few weeks ago. Participating well with therapies. She can walk on her own with a walker 130 feet, minimal assistance for standing. No longer needing a lift. Care conference with extension of rehab to 7/9/2021. After rehab, uncertain plans; possibly living with Rita if she can provide for mom or may need longer term care.   -Energy has been improving.  -Mental status stable, more slowed/ mild confusion. She continues to note only mild aphasia. Previously working with speech therapy, but no longer per Olga.   -Left V2 division facial pain nightly when going to bed. Sharp pain lasting until she falls asleep. 8/10 in intensity.   -Headaches, resolved.  -Denies changes in sensation.  -Off dexamethasone.   -On Keppra 1250 mg BID, no recurrent seizures. Episode of possible oral automatisms and drowsiness for which a 30 minute EEG was performed 6/16. Slowing, but negative for epileptiform activity or seizures. Medication changes were made including discontinued AM seroquel and reduced evening seroquel with improvement in cognition.      REVIEW OF SYSTEMS  A comprehensive ROS negative  except as in HPI.    MEDICATIONS   No current facility-administered medications for this visit.      Current Outpatient Medications   Medication     temozolomide (TEMODAR) 250 MG capsule     Facility-Administered Medications Ordered in Other Visits   Medication     - Skin Test Reading -     acetaminophen (TYLENOL) tablet 650 mg     albuterol (PROAIR HFA/PROVENTIL HFA/VENTOLIN HFA) 108 (90 Base) MCG/ACT inhaler 2 puff     amLODIPine (NORVASC) tablet 5 mg     busPIRone (BUSPAR) tablet 30 mg     calcium carbonate (TUMS) chewable tablet 1,000 mg     calcium polycarbophil (FIBERCON) tablet 625 mg     carboxymethylcellulose PF (REFRESH PLUS) 0.5 % ophthalmic solution 1 drop     enoxaparin ANTICOAGULANT (LOVENOX) injection 40 mg     FLUoxetine (PROzac) capsule 60 mg     furosemide (LASIX) tablet 40 mg     levETIRAcetam (KEPPRA) tablet 1,250 mg     linaclotide (LINZESS) capsule 290 mcg     melatonin tablet 3 mg     menthol (ICY HOT) 5 % patch 1 patch    And     menthol (ICY HOT) Patch in Place     Nurse may request from Pharmacy a change of form of medication (e.g. Liquid to tablet).     ondansetron (ZOFRAN-ODT) ODT tab 4 mg     pantoprazole (PROTONIX) EC tablet 40 mg     polyethylene glycol (MIRALAX) Packet 17 g     QUEtiapine (SEROquel) tablet 25 mg     simethicone (MYLICON) chewable tablet 80 mg     sulfamethoxazole-trimethoprim (BACTRIM) 400-80 MG per tablet 1 tablet     tuberculin injection 5 Units     Current Outpatient Medications   Medication Sig Dispense Refill     temozolomide (TEMODAR) 250 MG capsule Take 1 capsule (250 mg) by mouth daily for 5 days Take ondansetron 30-60 min before temozolomide. Take at bedtime on an empty stomach. 5 capsule 0     DRUG ALLERGIES   Allergies   Allergen Reactions     Bacitracin Rash     Bactroban is effective; No difficulties     Erythromycin      intolerant.     Meperidine Hcl      intolerant only   Demerol     Chloraprep One Step Rash     IMMUNIZATIONS   Immunization History    Administered Date(s) Administered     COVID-19,PF,Moderna 03/18/2021     Flu 65+ Years 09/28/2020     HepB 01/01/1990     Influenza (IIV3) PF 01/01/2001     Influenza, Quad, High Dose, Pf, 65yr + 09/28/2020     Pneumococcal 23 valent 07/22/2020     Tetanus 06/13/2004     Zoster vaccine recombinant adjuvanted (SHINGRIX) 12/24/2019, 10/12/2020     ONCOLOGIC HISTORY  -2/2021 PRESENTATION: Progressive aphasia.   -2/19/2021 MR brain imaging with 3.3 x 2.8 x 2.8 cm enhancing mass in left frontal-parietal region. A second contrast enhancing lesion (0.5 x 1.0 x 1.0 cm) in right occipital lobe which is dural based and largely stable in size since 9/2011; likely representing a meningioma.  -2/23/2021 SURGERY: Gross total resection by Dr. Cummings  PATHOLOGY: Glioblastoma; IDH1-R132H wild-type/ IDH 1 and 2 wildtype, MGMT promoter methylated. Not BRAF mutated.   -3/23/2021 NEURO-ONC: Recommending chemoradiotherapy.   -3/2021 ADMISSION: SOB and chest pain; CT PA negative, but bilateral DVT noted on U/S. Started on Lovenox. Acute hypoxic respiratory failure in the setting of bilateral pneumonia; presumed PJP, concern for transfusion-related acute lung injury and right hemidiaphragm paralysis. Seizure. Right retroperitoneal hematoma. Ileus. Non-severe malnutrition on TPN with concern for refeeding syndrome. 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 Shock Liver.  -5/4 - 5/27/2021 RADS: 15 fractions.   -5/25/2021 NEURO-ONC/ DEVICE: Discussed the role of Optune; to be considered. Repeat MRI in 4 weeks with plans to start adjuvant temozolomide.   -6/22/2021 NEURO-ONC/ MRB/ CHEMO: Clinically improving. Imaging largely stable. Starting adjuvant temozolomide 150mg/m2 (250mg), cycle 1 (start date 6/24).     SOCIAL HISTORY   History   Smoking Status     Never Smoker   Smokeless Tobacco     Never Used    Social History    Substance and Sexual Activity      Alcohol use: Yes        Comment: very rare     History   Drug Use No       PHYSICAL  "EXAMINATION  /82 (BP Location: Left arm, Patient Position: Sitting, Cuff Size: Adult Regular)   Pulse 100   Temp 97.8  F (36.6  C) (Oral)   Resp 16   Wt 68.9 kg (151 lb 12.8 oz)   SpO2 95%   BMI 26.90 kg/m    Wt Readings from Last 2 Encounters:   06/19/21 68.9 kg (151 lb 12.8 oz)   06/22/21 68.9 kg (151 lb 12.8 oz)      Ht Readings from Last 2 Encounters:   06/18/21 1.6 m (5' 2.99\")   05/26/21 1.6 m (5' 3\")     KPS: 60    -Generally well appearing. Slightly fatigued.   -Respiratory: No increased work of breathing.  -Skin: No facial rashes   -Psychiatric: Normal mood and affect. Pleasant, talkative.  -Neurologic:   MENTAL STATUS:     Alert, oriented to month, but not day (7) or year.    Recall: Intact.     Speech largely fluent. Mild aphasia with paraphasic error especially  With challenging words cuticle.   Comprehension intact to multi-step commands.   Good right-left orientation.     CRANIAL NERVES:     Pupils are equal, round.     Extraocular movements full, denies diplopia.     Visual fields full.     Facial sensation intact to light touch.   No facial droop today.   Hearing slightly decreased bilaterally.   Normal phonation.   MOTOR: No pronation or drift. (Pronation at baseline on right; related to shoulder).   SENSATION: Intact to light touch throughout.  COORDINATION: Slight impairment on finger-nose with eyes open and closed on the right.  GAIT: Sitting in wheelchair, uses walker, which is not present and did not test.     MEDICAL RECORDS  Obtained and personally reviewed all available outside medical records in addition to reviewing any records available in our electronic system.     LABS  Personally reviewed all available lab results.     IMAGING  Personally reviewed MR brain imaging from today and compared to pre-radiation imaging. To my eye, there is interval subtle (2mm) increase in size of a enhancing nodule superior to resection cavity concerning for residual cancer.      Imaging was " shown to and results were reviewed with Olga and La Nena.     Imaging personally reviewed with Dr. Spann.        IMPRESSION  Clinic time for this high complexity encounter was spent discussing in detail the nature of her cancer, providing emotional support, answering questions pertaining to my recommendations, and devising the plan as outlined below.     Clinically, I am again pleased with how well Olga is improving with the assistance of therapies and further optimal medical management. She is off dexamethasone. Energy is improving. She is able to walk with a walker and no longer needing a lift, which is excellent progress.     I have been in communication with her team in rehab regarding medical management. One question being the need for life-long anticoagulation. Currently, Olga is on ppx dosed Lovenox. While Olga previously had a bleed, direct contraindications to anticoagulation include active pathological bleeding, which is not occurring. 10a inhibitors should be used in caution in the elderly due to impaired clearance, so it would be my thought to continue with Lovenox at therapeutic dosing (1mg/kg BID). However, Olga is not liking the daily injections due to discomfort. Given her complicated medical background and the continued high risk for clot development given her cancer diagnosis with the potential of IVC filter failure, thus the indication for life-long anticoagulation, but also the potential for pancytopenia that can be seen with chemotherapy use, I will place a consult to hem/ onc for additional guidance.     With regard to cancer-directed therapy, a multimodal treatment approach first involves maximal surgical resection, followed by upfront treatment, which in Olga's case, was with radiotherapy alone due to her complex medical situation at the time. Now, the plan is to initiate adjuvant temozolomide. In the adjuvant phase, temozolomide is typically dosed at 150mg/m2 for days  1-5 of a 28 day cycle for the first cycle. If this dosing is not well tolerated, can switch to metronomic dosing; 75mg/m2 daily. The previously reviewed common side effects of temozolomide can still be anticipated and were discussed as including, but not limited to, fatigue, nausea, and constipation. Bone marrow suppression can result in leukopenia and thrombocytopenia.     Imaging with subtle changes as detailed above. Will repeat in 2 months.     Personally reviewed labs from today; no concerning results.     Holding on the option of Optune at this time.     PROBLEM LIST  Glioblastoma  Aphasia  Apraxia    PLAN  -CANCER DIRECTED THERAPY-  Will initiate adjuvant temozolomide at 150mg/m2 (250 mg), cycle 1 (start date of 6/24).   -Instructed to take temozolomide in the evening on an empty stomach, 30 minutes after Zofran dosing.  -Supportive medications; Zofran and bowel regimen.   -Currently on Bactrim.   -Repeat 28 day cycle if WBC >= 3, ANC >= 1.5, HgB >= 10, and platelets >= 100.    -Surveillance labs reviewed, at goal for chemotherapy.   -Will continue every 2 week CBC and CMP every 4 weeks.    -Post-radiation imaging with slight interval increase in enhancing parenchymal nodule superior to the resection cavity. It was seen previous imaging, but appears slightly larger compared to previous. It does not change current plan for chemotherapy with temozolomide and we will continue to initiate. Repeat imaging in 2 months, prior to cycle 3.    -STEROIDS-  -Off dexamethasone.    -SEIZURE MANAGEMENT-  -Given history of seizures, antiepileptics are indicated.   -Continue Keppra.     -DVT-  -Requires lifelong anticoagulation.   -Referral to hem/onc.   -Will need to consider removal of the IVC filter.     -Quality of life/ MOOD/ FATIGUE-  -Denies any mood issues.  -Continue to monitor mood as untreated/ undertreated depression can worsen fatigue, dysorexia, and quality of life.    -OTHER-  -Recommended that Olga dupree  her second COVID vaccination.     Return to clinic in 1 month + labs.     In the meantime, Olga and La Nena know to call with questions or concerns or to report new complaints and can be seen sooner if needed.    Stephanie Baker MD  Neuro-oncology

## 2021-06-20 NOTE — PLAN OF CARE
Alert and oriented, VSS. Was found to have a skin tear while working w/ PT; non-adherent dressing applied. Reported slight stinging pain but declined intervention. No SOB. Appetite good. Fluids promoted. Generalized bruising to BUE and BLE. Voiding via purewick. LBM 6/20. K level 3.5; per protocol no replacement needed and lab scheduled for 6/21 AM. Son visited this afternoon and declined to wear a mask.     Patient's most recent vital signs are:     Vital signs:  BP: 115/64  Temp: 97.6  HR: 82  RR: 16  SpO2: 92 %     Patient does not have new respiratory symptoms.  Patient does not have new sore throat.  Patient does not have a fever greater than 99.5.

## 2021-06-20 NOTE — PROGRESS NOTES
Brief Medicine Note    Medicine staffed messaged Dr. Baker, patient's Oncologist regarding anticoagulation plan given history of DVT/PE c/b retroperitoneal hemorrhage (4/2021) s/p IVC filter. Patient has remained on prophylactic anticoagulation while at TCU.     Dr. Baker discussed that glioblastoma is one of the most hypercoagulable cancers and given not curable, patient will always be at risk for development of recurrent VTE. Given retroperitoneal hemorrhage was awhile ago, and Hgb stable, Dr. Baker discussed no contraindication for anticoagulation at this time, and recommends Lovenox 1mg/kg BID vs Xarelto 20mg daily.     Discussed this with patient, patient's son Dario (in room) and patient's daughter La Nena (on phone). Patient is scheduled to visit with Dr. Baker on 6/22. Patient and family elect to wait any further anticoagulation change until they can discuss further with Dr. Baker. La Nena (daughter) who is a retired RN will be going to the appointment with the patient on Tuesday.  Staff message sent to Dr. Baker for update. For now, will continue Lovenox 40mg daily for DVT ppx.     Patient also notes mild lower extremity swelling which has now improved. On exam, no calf tenderness. Calves symmetric. Distal pulses intact. Suspect venous insufficiency. Will order compression stockings. Elevate legs PRN. Monitor.       Rabia López PA-C  Hospitalist Service  Pager [phone]

## 2021-06-20 NOTE — PATIENT INSTRUCTIONS
Imaging largely stable.   Repeat in 2 months.     Starting adjuvant-dosed chemotherapy with temozolomide  -5 consectutive nights (In a 28 day cycle; 5 days on treatment and 23 days off.)  -Dose: 250mg  -Start date: 6/24  -Take at bedtime on an empty stomach  -Take 30 minutes after Zofran dosing (can increase to 8mg if needed for control of nausea)  -Continue bowel regimen  -Continue to have every 2 week lab draws    Continue Bactrim  Continue Keppra    Needing lifelong anticoagulation; Referral to hem/ onc.    Return to clinic in 4 weeks.    Stephanie Baker MD  Neuro-oncology

## 2021-06-21 ENCOUNTER — APPOINTMENT (OUTPATIENT)
Dept: SPEECH THERAPY | Facility: SKILLED NURSING FACILITY | Age: 76
End: 2021-06-21
Payer: MEDICARE

## 2021-06-21 ENCOUNTER — APPOINTMENT (OUTPATIENT)
Dept: PHYSICAL THERAPY | Facility: SKILLED NURSING FACILITY | Age: 76
End: 2021-06-21
Payer: MEDICARE

## 2021-06-21 ENCOUNTER — APPOINTMENT (OUTPATIENT)
Dept: OCCUPATIONAL THERAPY | Facility: SKILLED NURSING FACILITY | Age: 76
End: 2021-06-21
Payer: MEDICARE

## 2021-06-21 LAB
ANION GAP SERPL CALCULATED.3IONS-SCNC: 8 MMOL/L (ref 3–14)
BASOPHILS # BLD AUTO: 0 10E9/L (ref 0–0.2)
BASOPHILS NFR BLD AUTO: 0.8 %
BUN SERPL-MCNC: 12 MG/DL (ref 7–30)
CALCIUM SERPL-MCNC: 9.4 MG/DL (ref 8.5–10.1)
CHLORIDE SERPL-SCNC: 107 MMOL/L (ref 94–109)
CO2 SERPL-SCNC: 26 MMOL/L (ref 20–32)
CREAT SERPL-MCNC: 0.57 MG/DL (ref 0.52–1.04)
DIFFERENTIAL METHOD BLD: ABNORMAL
EOSINOPHIL # BLD AUTO: 0.5 10E9/L (ref 0–0.7)
EOSINOPHIL NFR BLD AUTO: 9.5 %
ERYTHROCYTE [DISTWIDTH] IN BLOOD BY AUTOMATED COUNT: 15.4 % (ref 10–15)
GFR SERPL CREATININE-BSD FRML MDRD: >90 ML/MIN/{1.73_M2}
GLUCOSE SERPL-MCNC: 98 MG/DL (ref 70–99)
HCT VFR BLD AUTO: 37.8 % (ref 35–47)
HGB BLD-MCNC: 12.2 G/DL (ref 11.7–15.7)
IMM GRANULOCYTES # BLD: 0 10E9/L (ref 0–0.4)
IMM GRANULOCYTES NFR BLD: 0.6 %
LYMPHOCYTES # BLD AUTO: 0.5 10E9/L (ref 0.8–5.3)
LYMPHOCYTES NFR BLD AUTO: 9.5 %
MCH RBC QN AUTO: 30.9 PG (ref 26.5–33)
MCHC RBC AUTO-ENTMCNC: 32.3 G/DL (ref 31.5–36.5)
MCV RBC AUTO: 96 FL (ref 78–100)
MONOCYTES # BLD AUTO: 0.4 10E9/L (ref 0–1.3)
MONOCYTES NFR BLD AUTO: 8.3 %
NEUTROPHILS # BLD AUTO: 3.7 10E9/L (ref 1.6–8.3)
NEUTROPHILS NFR BLD AUTO: 71.3 %
NRBC # BLD AUTO: 0 10*3/UL
NRBC BLD AUTO-RTO: 0 /100
PLATELET # BLD AUTO: 339 10E9/L (ref 150–450)
POTASSIUM SERPL-SCNC: 3.2 MMOL/L (ref 3.4–5.3)
POTASSIUM SERPL-SCNC: 3.4 MMOL/L (ref 3.4–5.3)
POTASSIUM SERPL-SCNC: 3.8 MMOL/L (ref 3.4–5.3)
RBC # BLD AUTO: 3.95 10E12/L (ref 3.8–5.2)
SODIUM SERPL-SCNC: 141 MMOL/L (ref 133–144)
WBC # BLD AUTO: 5.2 10E9/L (ref 4–11)

## 2021-06-21 PROCEDURE — 97530 THERAPEUTIC ACTIVITIES: CPT | Mod: GO

## 2021-06-21 PROCEDURE — 120N000009 HC R&B SNF

## 2021-06-21 PROCEDURE — 84132 ASSAY OF SERUM POTASSIUM: CPT | Performed by: INTERNAL MEDICINE

## 2021-06-21 PROCEDURE — 250N000013 HC RX MED GY IP 250 OP 250 PS 637: Performed by: PHYSICIAN ASSISTANT

## 2021-06-21 PROCEDURE — 250N000013 HC RX MED GY IP 250 OP 250 PS 637: Performed by: INTERNAL MEDICINE

## 2021-06-21 PROCEDURE — 250N000011 HC RX IP 250 OP 636: Performed by: PHYSICIAN ASSISTANT

## 2021-06-21 PROCEDURE — 97110 THERAPEUTIC EXERCISES: CPT | Mod: GP

## 2021-06-21 PROCEDURE — 97110 THERAPEUTIC EXERCISES: CPT | Mod: GO

## 2021-06-21 PROCEDURE — 36415 COLL VENOUS BLD VENIPUNCTURE: CPT | Performed by: INTERNAL MEDICINE

## 2021-06-21 PROCEDURE — 85025 COMPLETE CBC W/AUTO DIFF WBC: CPT | Performed by: PHYSICIAN ASSISTANT

## 2021-06-21 PROCEDURE — 92526 ORAL FUNCTION THERAPY: CPT | Mod: GN

## 2021-06-21 PROCEDURE — 97530 THERAPEUTIC ACTIVITIES: CPT | Mod: GP

## 2021-06-21 PROCEDURE — 97116 GAIT TRAINING THERAPY: CPT | Mod: GP

## 2021-06-21 PROCEDURE — 36415 COLL VENOUS BLD VENIPUNCTURE: CPT | Performed by: PHYSICIAN ASSISTANT

## 2021-06-21 PROCEDURE — 80048 BASIC METABOLIC PNL TOTAL CA: CPT | Performed by: PHYSICIAN ASSISTANT

## 2021-06-21 RX ORDER — POTASSIUM CHLORIDE 750 MG/1
40 TABLET, EXTENDED RELEASE ORAL ONCE
Status: COMPLETED | OUTPATIENT
Start: 2021-06-21 | End: 2021-06-21

## 2021-06-21 RX ADMIN — LEVETIRACETAM 1250 MG: 750 TABLET, FILM COATED ORAL at 08:25

## 2021-06-21 RX ADMIN — QUETIAPINE FUMARATE 25 MG: 25 TABLET ORAL at 21:42

## 2021-06-21 RX ADMIN — PANTOPRAZOLE SODIUM 40 MG: 40 TABLET, DELAYED RELEASE ORAL at 16:19

## 2021-06-21 RX ADMIN — PANTOPRAZOLE SODIUM 40 MG: 40 TABLET, DELAYED RELEASE ORAL at 06:30

## 2021-06-21 RX ADMIN — LEVETIRACETAM 1250 MG: 750 TABLET, FILM COATED ORAL at 21:42

## 2021-06-21 RX ADMIN — AMLODIPINE BESYLATE 5 MG: 5 TABLET ORAL at 08:25

## 2021-06-21 RX ADMIN — SULFAMETHOXAZOLE AND TRIMETHOPRIM 1 TABLET: 400; 80 TABLET ORAL at 08:25

## 2021-06-21 RX ADMIN — FUROSEMIDE 40 MG: 40 TABLET ORAL at 08:25

## 2021-06-21 RX ADMIN — ENOXAPARIN SODIUM 40 MG: 40 INJECTION SUBCUTANEOUS at 21:42

## 2021-06-21 RX ADMIN — POTASSIUM CHLORIDE 40 MEQ: 750 TABLET, EXTENDED RELEASE ORAL at 16:53

## 2021-06-21 RX ADMIN — CALCIUM POLYCARBOPHIL 625 MG: 625 TABLET, FILM COATED ORAL at 08:25

## 2021-06-21 RX ADMIN — BUSPIRONE HYDROCHLORIDE 30 MG: 15 TABLET ORAL at 08:25

## 2021-06-21 RX ADMIN — FLUOXETINE HYDROCHLORIDE 60 MG: 20 CAPSULE ORAL at 08:25

## 2021-06-21 RX ADMIN — POTASSIUM CHLORIDE 40 MEQ: 750 TABLET, EXTENDED RELEASE ORAL at 11:00

## 2021-06-21 RX ADMIN — BUSPIRONE HYDROCHLORIDE 30 MG: 15 TABLET ORAL at 21:42

## 2021-06-21 RX ADMIN — LINACLOTIDE 290 MCG: 145 CAPSULE, GELATIN COATED ORAL at 06:30

## 2021-06-21 NOTE — PLAN OF CARE
Pt is alert and oriented x 4. Denies chest pain, SOB, headache, nausea, or abdominal discomfort. Denies pain. Jessica on, LBM 06/21/2021. Dd3 with thin liquid diet. Potassium replacement twice, one at morning and second on evening shift. Bruised on Bilateral forearms and shins. BLE edema, refused tubi  (Put pillow underneath leg). Call light within reach. Continue with POC.    Patient's most recent vital signs are:     Vital signs:  BP: 120/75  Temp: 96.9  HR: 102  RR: 16  SpO2: 95 %     Patient does not have new respiratory symptoms.  Patient does not have new sore throat.  Patient does not have a fever greater than 99.5.

## 2021-06-21 NOTE — PLAN OF CARE
Discharge Planner Post-Acute Rehab OT:      Discharge Plan: Pt reports plan remains to return to her daughter's home with intermittent supervision.  Pt also anticipates continued assistance with finances, medication management, meal prep, general IADL, and bathing.  Pt would need to be independent and safe with toileting and light meal prep.  Pt is progressing but as of tx on 6/17, anticipate she will still need some degree of assist with mobility and ADLs at discharge.      Precautions: falls     Current Status:  ADLs: CGA-Min A sit <> stand with FWW, CGA-Min A ambulation with FWW and w/c follow d/t fatigue, Mod A LB dressing from EOB but min A when reclined in bed, SBA-Min A UB dressing, SBA bed mobility when given extra time.   IADLs: NT  Vision/Cognition: Pt oriented with short term memory deficits present.  Pt reports some difficulty reading as well     Assessment: Pt improved from last week in terms of mobility and standing/transfer tolerance. Tx focus on activity tolerance and safety with functional mobility using FWW. Pt participated in ball tossing exercise x5 minutes with 1 rest break. Pt completed ball batting activity incorporating chest press using 1.5# DR with cuing for form 10 reps x3 sets. Pt demos R side inattention at times with poor safety awareness. Pt amb in hallway with Min A approx 75' with FWW with cuing and assist for avoiding obstacle x2. Pt visibly fatigued towards end of ambulation demo'ing increased R side instability yet declined rest break although cued x3 for safety. Pt returned to supine once back in room and demod ability to complete sit > supine with SBA only. Cont per POC.    Care conference scheduled for tomorrow with daughter, son, patient, and interdisciplinary team to discuss discharge planning.     Other Barriers to Discharge (DME, Family Training, etc): Safety, family training.

## 2021-06-21 NOTE — PLAN OF CARE
"Discharge Planner Post-Acute Rehab PT:      Discharge Plan: home with daughter's assist, not 24 hour is pt's goal     Precautions: fall, hx of recent seizures, swallowing     Current Status:  Bed Mobility: cga to min A, Mod assist to scoot hips  Transfer: cga to min A stand pivot with FWW  Gait: 130 feet with FWW cga and w/c follow for safety  Stairs: Step ups using 6\" step x8 reps x2 sets  Balance: cga to occasional min A when fatigued.     Assessment: Pt more alert again this session. Pt able to amb ~110 ft using ww Gerard, w/c follow. STS CGA using ww. Continues to need cues for hand placement, base of support and head position with transfers to decrease dominant L Lean.  Step ups using first step of set of 3 in PT gym, Gerard x6 reps, x8 reps and x7 reps with rest break after each set d/t fatigue. B rails. Care conference tomorrow.    6/20: Skin tear noted on anterior lower leg at the beginning of the session (unknown mechanism prior to PT session) RN assisted pt post.       Other Barriers to Discharge (DME, Family Training, etc): functional tolerance, medical complexity, functional status, neuro involvement  "

## 2021-06-21 NOTE — PLAN OF CARE
"Discharge Planner Post-Acute Rehab SLP:      Discharge Plan: tbd     Precautions: Dysphagia     Current Status:  Communication: Min difficulties with conversational tasks (word finding) and able to follow directions without difficulties.  Cognition: mild deficits at least since GBM resection 2/2021, had been seen prior admissions/OP, probable \"plateau\" as abilities appear unchanged, likely functional for needs.   Swallow: Dysphagia diet 3 and thin liquids per video swallow completed 6/11.     Assessment: Pt reported difficulty with mastication and ease of oral clearance with the \"dry chicken\" in the quesadilla and the melon. She reported a preference to continue soft solids at this point. Noted difficulty fully masticating and forming a bolus with regular solids. Continue NDD3 textures and thin liquids without a straw.     Other Barriers to Discharge (Family Training, etc): tbd    "

## 2021-06-21 NOTE — PLAN OF CARE
Pt alert and oriented. Able to make needs known. Denies pain, cp or SOB. Slept well throughout the night. Pure wick intact w/ adequate output. Seizure precautions maintained. Call light in reach, continue w/ POC.      Patient's most recent vital signs are:     Vital signs:  BP: 111/76  Temp: 96  HR: 102  RR: 16  SpO2: 94 %     Patient does not have new respiratory symptoms.  Patient does not have new sore throat.  Patient does not have a fever greater than 99.5.

## 2021-06-21 NOTE — PLAN OF CARE
RN: Makes needs known.  Denies pain, nausea. Seizure precautions maintained. Side rails padded, no seizure activity. Assit of one transfer.   Pt received 5, 250 mg capsules of Temozolomide from specialty pharmacy  Mailed to her. She has them in her room and said she will keep them in her room until she talks to her daughter. I suggest locking them up with security, she says she will let me know after talking to her dtr.     most recent vital signs are:     Vital signs:  BP: 118/77  Temp: 97.5  HR: 105  RR: 16  SpO2: 96 %     Patient does not have new respiratory symptoms.  Patient does not have new sore throat.  Patient does not have a fever greater than 99.5.

## 2021-06-22 ENCOUNTER — DOCUMENTATION ONLY (OUTPATIENT)
Dept: PHARMACY | Facility: CLINIC | Age: 76
End: 2021-06-22

## 2021-06-22 ENCOUNTER — ONCOLOGY VISIT (OUTPATIENT)
Dept: ONCOLOGY | Facility: CLINIC | Age: 76
End: 2021-06-22
Attending: PSYCHIATRY & NEUROLOGY
Payer: MEDICARE

## 2021-06-22 ENCOUNTER — HOSPITAL ENCOUNTER (OUTPATIENT)
Dept: LAB | Facility: CLINIC | Age: 76
End: 2021-06-22
Attending: PSYCHIATRY & NEUROLOGY
Payer: MEDICARE

## 2021-06-22 ENCOUNTER — HOSPITAL ENCOUNTER (OUTPATIENT)
Dept: MRI IMAGING | Facility: CLINIC | Age: 76
End: 2021-06-22
Attending: PSYCHIATRY & NEUROLOGY
Payer: MEDICARE

## 2021-06-22 VITALS
SYSTOLIC BLOOD PRESSURE: 127 MMHG | BODY MASS INDEX: 26.9 KG/M2 | HEART RATE: 100 BPM | RESPIRATION RATE: 16 BRPM | WEIGHT: 151.8 LBS | TEMPERATURE: 97.8 F | DIASTOLIC BLOOD PRESSURE: 82 MMHG | OXYGEN SATURATION: 95 %

## 2021-06-22 DIAGNOSIS — R11.2 CHEMOTHERAPY-INDUCED NAUSEA AND VOMITING: ICD-10-CM

## 2021-06-22 DIAGNOSIS — T45.1X5A ANTINEOPLASTIC CHEMOTHERAPY INDUCED PANCYTOPENIA (H): ICD-10-CM

## 2021-06-22 DIAGNOSIS — T45.1X5A CHEMOTHERAPY-INDUCED NAUSEA AND VOMITING: ICD-10-CM

## 2021-06-22 DIAGNOSIS — C71.9 GLIOBLASTOMA (H): ICD-10-CM

## 2021-06-22 DIAGNOSIS — C71.9 GBM (GLIOBLASTOMA MULTIFORME) (H): Primary | ICD-10-CM

## 2021-06-22 DIAGNOSIS — D61.810 ANTINEOPLASTIC CHEMOTHERAPY INDUCED PANCYTOPENIA (H): ICD-10-CM

## 2021-06-22 LAB
ALBUMIN SERPL-MCNC: 3.5 G/DL (ref 3.4–5)
ALP SERPL-CCNC: 103 U/L (ref 40–150)
ALT SERPL W P-5'-P-CCNC: 36 U/L (ref 0–50)
ANION GAP SERPL CALCULATED.3IONS-SCNC: 4 MMOL/L (ref 3–14)
AST SERPL W P-5'-P-CCNC: 43 U/L (ref 0–45)
BASOPHILS # BLD AUTO: 0.1 10E9/L (ref 0–0.2)
BASOPHILS NFR BLD AUTO: 0.8 %
BILIRUB SERPL-MCNC: 0.3 MG/DL (ref 0.2–1.3)
BUN SERPL-MCNC: 12 MG/DL (ref 7–30)
CALCIUM SERPL-MCNC: 10 MG/DL (ref 8.5–10.1)
CHLORIDE SERPL-SCNC: 109 MMOL/L (ref 94–109)
CO2 SERPL-SCNC: 26 MMOL/L (ref 20–32)
CREAT SERPL-MCNC: 0.53 MG/DL (ref 0.52–1.04)
DIFFERENTIAL METHOD BLD: ABNORMAL
EOSINOPHIL # BLD AUTO: 0.6 10E9/L (ref 0–0.7)
EOSINOPHIL NFR BLD AUTO: 7.8 %
ERYTHROCYTE [DISTWIDTH] IN BLOOD BY AUTOMATED COUNT: 15.4 % (ref 10–15)
GFR SERPL CREATININE-BSD FRML MDRD: >90 ML/MIN/{1.73_M2}
GLUCOSE SERPL-MCNC: 112 MG/DL (ref 70–99)
HCT VFR BLD AUTO: 39.9 % (ref 35–47)
HGB BLD-MCNC: 12.8 G/DL (ref 11.7–15.7)
IMM GRANULOCYTES # BLD: 0.1 10E9/L (ref 0–0.4)
IMM GRANULOCYTES NFR BLD: 0.8 %
LABORATORY COMMENT REPORT: NORMAL
LYMPHOCYTES # BLD AUTO: 0.5 10E9/L (ref 0.8–5.3)
LYMPHOCYTES NFR BLD AUTO: 6.3 %
MCH RBC QN AUTO: 30.8 PG (ref 26.5–33)
MCHC RBC AUTO-ENTMCNC: 32.1 G/DL (ref 31.5–36.5)
MCV RBC AUTO: 96 FL (ref 78–100)
MONOCYTES # BLD AUTO: 0.5 10E9/L (ref 0–1.3)
MONOCYTES NFR BLD AUTO: 7.1 %
NEUTROPHILS # BLD AUTO: 5.7 10E9/L (ref 1.6–8.3)
NEUTROPHILS NFR BLD AUTO: 77.2 %
NRBC # BLD AUTO: 0 10*3/UL
NRBC BLD AUTO-RTO: 0 /100
PLATELET # BLD AUTO: 377 10E9/L (ref 150–450)
POTASSIUM SERPL-SCNC: 4 MMOL/L (ref 3.4–5.3)
POTASSIUM SERPL-SCNC: 4.3 MMOL/L (ref 3.4–5.3)
PROT SERPL-MCNC: 7.4 G/DL (ref 6.8–8.8)
RBC # BLD AUTO: 4.16 10E12/L (ref 3.8–5.2)
SARS-COV-2 RNA RESP QL NAA+PROBE: NEGATIVE
SODIUM SERPL-SCNC: 139 MMOL/L (ref 133–144)
SPECIMEN SOURCE: NORMAL
WBC # BLD AUTO: 7.3 10E9/L (ref 4–11)

## 2021-06-22 PROCEDURE — 70553 MRI BRAIN STEM W/O & W/DYE: CPT | Mod: ME

## 2021-06-22 PROCEDURE — 84132 ASSAY OF SERUM POTASSIUM: CPT | Performed by: INTERNAL MEDICINE

## 2021-06-22 PROCEDURE — 250N000013 HC RX MED GY IP 250 OP 250 PS 637: Performed by: PHYSICIAN ASSISTANT

## 2021-06-22 PROCEDURE — A9585 GADOBUTROL INJECTION: HCPCS | Performed by: PSYCHIATRY & NEUROLOGY

## 2021-06-22 PROCEDURE — 80053 COMPREHEN METABOLIC PANEL: CPT | Performed by: PSYCHIATRY & NEUROLOGY

## 2021-06-22 PROCEDURE — 250N000011 HC RX IP 250 OP 636: Performed by: PHYSICIAN ASSISTANT

## 2021-06-22 PROCEDURE — 36415 COLL VENOUS BLD VENIPUNCTURE: CPT | Performed by: INTERNAL MEDICINE

## 2021-06-22 PROCEDURE — 99366 TEAM CONF W/PAT BY HC PROF: CPT

## 2021-06-22 PROCEDURE — 85025 COMPLETE CBC W/AUTO DIFF WBC: CPT | Performed by: PSYCHIATRY & NEUROLOGY

## 2021-06-22 PROCEDURE — 97116 GAIT TRAINING THERAPY: CPT | Mod: GP

## 2021-06-22 PROCEDURE — 99309 SBSQ NF CARE MODERATE MDM 30: CPT | Performed by: PHYSICIAN ASSISTANT

## 2021-06-22 PROCEDURE — 999N000125 HC STATISTIC PATIENT MED CONFERENCE < 30 MIN

## 2021-06-22 PROCEDURE — 255N000002 HC RX 255 OP 636: Performed by: PSYCHIATRY & NEUROLOGY

## 2021-06-22 PROCEDURE — 99207 PR CDG-CODE CATEGORY CHANGED: CPT | Performed by: PHYSICIAN ASSISTANT

## 2021-06-22 PROCEDURE — 92526 ORAL FUNCTION THERAPY: CPT | Mod: GN

## 2021-06-22 PROCEDURE — 99215 OFFICE O/P EST HI 40 MIN: CPT | Performed by: PSYCHIATRY & NEUROLOGY

## 2021-06-22 PROCEDURE — 97535 SELF CARE MNGMENT TRAINING: CPT | Mod: GO

## 2021-06-22 PROCEDURE — 97110 THERAPEUTIC EXERCISES: CPT | Mod: GP

## 2021-06-22 PROCEDURE — G0463 HOSPITAL OUTPT CLINIC VISIT: HCPCS

## 2021-06-22 PROCEDURE — 87635 SARS-COV-2 COVID-19 AMP PRB: CPT | Performed by: PHYSICIAN ASSISTANT

## 2021-06-22 PROCEDURE — 120N000009 HC R&B SNF

## 2021-06-22 RX ORDER — LANOLIN ALCOHOL/MO/W.PET/CERES
3 CREAM (GRAM) TOPICAL
Status: DISCONTINUED | OUTPATIENT
Start: 2021-06-22 | End: 2021-07-06

## 2021-06-22 RX ORDER — GADOBUTROL 604.72 MG/ML
7 INJECTION INTRAVENOUS ONCE
Status: COMPLETED | OUTPATIENT
Start: 2021-06-22 | End: 2021-06-22

## 2021-06-22 RX ADMIN — ACETAMINOPHEN 650 MG: 325 TABLET, FILM COATED ORAL at 21:18

## 2021-06-22 RX ADMIN — BUSPIRONE HYDROCHLORIDE 30 MG: 15 TABLET ORAL at 21:19

## 2021-06-22 RX ADMIN — SULFAMETHOXAZOLE AND TRIMETHOPRIM 1 TABLET: 400; 80 TABLET ORAL at 08:39

## 2021-06-22 RX ADMIN — AMLODIPINE BESYLATE 5 MG: 5 TABLET ORAL at 08:40

## 2021-06-22 RX ADMIN — FLUOXETINE HYDROCHLORIDE 60 MG: 20 CAPSULE ORAL at 08:40

## 2021-06-22 RX ADMIN — PANTOPRAZOLE SODIUM 40 MG: 40 TABLET, DELAYED RELEASE ORAL at 18:35

## 2021-06-22 RX ADMIN — GADOBUTROL 7 ML: 604.72 INJECTION INTRAVENOUS at 13:38

## 2021-06-22 RX ADMIN — PANTOPRAZOLE SODIUM 40 MG: 40 TABLET, DELAYED RELEASE ORAL at 06:22

## 2021-06-22 RX ADMIN — LINACLOTIDE 290 MCG: 145 CAPSULE, GELATIN COATED ORAL at 06:22

## 2021-06-22 RX ADMIN — LEVETIRACETAM 1250 MG: 750 TABLET, FILM COATED ORAL at 08:39

## 2021-06-22 RX ADMIN — LEVETIRACETAM 1250 MG: 750 TABLET, FILM COATED ORAL at 21:19

## 2021-06-22 RX ADMIN — ENOXAPARIN SODIUM 40 MG: 40 INJECTION SUBCUTANEOUS at 21:19

## 2021-06-22 RX ADMIN — FUROSEMIDE 40 MG: 40 TABLET ORAL at 08:39

## 2021-06-22 RX ADMIN — CALCIUM POLYCARBOPHIL 625 MG: 625 TABLET, FILM COATED ORAL at 08:39

## 2021-06-22 RX ADMIN — QUETIAPINE FUMARATE 25 MG: 25 TABLET ORAL at 21:19

## 2021-06-22 RX ADMIN — BUSPIRONE HYDROCHLORIDE 30 MG: 15 TABLET ORAL at 08:39

## 2021-06-22 ASSESSMENT — PAIN SCALES - GENERAL: PAINLEVEL: NO PAIN (0)

## 2021-06-22 NOTE — PLAN OF CARE
Discharge Planner Post-Acute Rehab OT:      Discharge Plan: Pt reports plan remains to return to her daughter's home with intermittent supervision.  Pt also anticipates continued assistance with finances, medication management, meal prep, general IADL, and bathing.  Pt would need to be independent and safe with toileting and light meal prep.  Pt is progressing but as of tx on 6/17, anticipate she will still need some degree of assist with mobility and ADLs at discharge.      Precautions: falls     Current Status:  ADLs: CGA-Min A sit <> stand with FWW, CGA-Min A ambulation with FWW and w/c follow d/t fatigue, Mod A LB dressing from EOB but min A when reclined in bed, SBA-Min A UB dressing, SBA bed mobility when given extra time.   IADLs: NT  Vision/Cognition: Pt oriented with short term memory deficits present.  Pt reports some difficulty reading as well     Assessment: Tx focus on full body dressing, toileting, and g/h seated EOS withs setup. Pt able to direct cares well. Pt overall required CGA-Min A with functional transfers/standing tasks. Pt has posterior lean when fatigued while standing. Pt able to participate today in brad cares and clothing management while standing with unilateral support. Pt demod good time management and sequencing skills as she was leaving for an appt following session and able to sequence tasks properly and efficiently. Cont per POC.    Care conference held prior to session with SW, PA, OT, PT, DON, patient, patient's daughter, and patient's son (children were remote). Update give on progress towards OT goals and current functional status. At this time it is recommended for patient to have 24/7 supervision along with hands on assist for functional mobility with FWW, bed mobility, toileting, bathing, and dressing d/t pt's limited insight into safety awareness and fatigue levels during mobility. Extended OT POC to further progress functional goals in order to determine discharge needs  and decrease level of assist. SW working with patient and family to discuss next steps and whether patient will return to daughter's home, her current senior care facility with hired in help, or another facility. Updated POC to last day therapy on 7/9.     Other Barriers to Discharge (DME, Family Training, etc): Safety, family training.

## 2021-06-22 NOTE — PLAN OF CARE
Patient's most recent vital signs are:     Vital signs:  BP: 143/70  Temp: 96.3  HR: 85  RR: 18  SpO2: 93 %     Patient does not have new respiratory symptoms.  Patient does not have new sore throat.  Patient does not have a fever greater than 99.5.    Balance and endurance has improved with ambulation and tranfers. Able to assist with bed mobility. Participates in therapies. Good appetite. Verbally can make her needs known. Pleasant. Incontinent of bowels this morning. Purewick at night.

## 2021-06-22 NOTE — PLAN OF CARE
"Discharge Planner Post-Acute Rehab SLP:      Discharge Plan: tbd     Precautions: Dysphagia     Current Status:  Communication: Min difficulties with conversational tasks (word finding) and able to follow directions without difficulties.  Cognition: mild deficits at least since GBM resection 2/2021, had been seen prior admissions/OP, probable \"plateau\" as abilities appear unchanged, likely functional for needs.   Swallow: Dysphagia diet 3 and thin liquids per video swallow completed 6/11.     Assessment: Pt has now tried a variety of regular textured solids of which she has had difficulty with mastication of greater than 50% of them. She does appropriately stop eating items that are too difficult for her, however she feels the most comfortable remaining on the softer diet at this time. Continue dysphagia diet 3 and thin liquids - NO straws. Will reduce SLP frequency to 1x/week, pt in agreement.      Other Barriers to Discharge (Family Training, etc): tbd    "

## 2021-06-22 NOTE — PROGRESS NOTES
"Oncology Rooming Note    June 22, 2021 2:50 PM   Olga Bailey is a 75 year old female who presents for:    Chief Complaint   Patient presents with     Oncology Clinic Visit     GBM (glioblastoma multiforme) (H)     Initial Vitals: /82 (BP Location: Left arm, Patient Position: Sitting, Cuff Size: Adult Regular)   Pulse 100   Temp 97.8  F (36.6  C) (Oral)   Wt 68.9 kg (151 lb 12.8 oz)   SpO2 95%   BMI 26.90 kg/m   Estimated body mass index is 26.9 kg/m  as calculated from the following:    Height as of 6/18/21: 1.6 m (5' 2.99\").    Weight as of this encounter: 68.9 kg (151 lb 12.8 oz). Body surface area is 1.75 meters squared.  No Pain (0) Comment: Data Unavailable   No LMP recorded. Patient has had a hysterectomy.  Allergies reviewed: Yes  Medications reviewed: Yes    Medications: Medication refills not needed today.  Pharmacy name entered into [facility]:    [id number] - [place], [place] - [address] [id number] - [place], [place] - [address] [facility] OF  & [id number] - [place], [place] - [address] AT [facility] OF [address]  [facility] - [place], [place] - [address]    Clinical concerns: no     Paula Turner CMA              "

## 2021-06-22 NOTE — PROGRESS NOTES
Transitional Care Unit  Progress Note  Miriam Leija PA-C  6/22/21          Assessment & Plan:   Olga Bailey is a 75 year old female with a hx of glioblastoma multiforme s/p resection (2/23/21), HTN, GERD, asthma, anxiety and depression. She was initially admitted to [facility] from 4/16-5/15 for acute hypoxic respiratory failure 2/2 PJP pneumonia. She was discharge to  TCU but required readmission to [facility] on 5/26 for seizure activity. During her hospitalization her Keppra dose was increased, steroids were started and she remained seizure free. She was transferred back to  TCU on 6/5 for ongoing rehabilitation.     # Glioblastoma multiforme s/p resection (2/23/21)  # Seizures   S/p resection in 2/23/21. Follows with Dr. Baker, Oncology as well as Dr. Spann with Radiation Oncology. Completed 15/15 sessions of radiation on 5/27. Heme/Onc initially concerned she may not be candidate for chemotherapy due to poor performance status. Had breakthrough seizure 5/26 prompting inpatient admission. She was started on Dexamethasone taper (completed 6/9), and Keppra dose was increased to 1250mg BID. On 6/16, there was concern for possible recurrent seizure like activity as Health Psychologist reported witnessing the patient have 3 episodes where her eyes closed and she licked her lips during their 10 minute conversation. This had not been witnessed by staff prior to this. Discussed with Neurology and patient had routine EEG on 6/16 without epileptiform discharges or seizures but with some mild abnormal slowing of background activity. During this period of time, Medicine noticed patient being more sedated, therefore Doxepin discontinued 6/15 and Seroquel 25mg BID discontinued and evening dose decreased from 50mg to 25mg. Now mentation much improved. No recurrent episodes as discussed. Suspect sedation was culprit. 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    - Seizure precautions  - Continue  Keppra 1250mg BID  - Follow-up with Dr. Baker as scheduled at [facility] today with MRI Brain, Labs and Clinic visit   - Follow-up with Radiation Oncology Dr. pSann for virtual visit on 6/24   - PT/OT   - RN to notify medicine with any sign of seizure activity: change in mental status, lick smacking, jerking, change in neurological exam, etc. And would discuss with Neurology  - Patient will start Temodar after Oncology visit with Dr. Baker today. Will need Zofran pre-scheduled 30-60mg prior to Temodar dosing to prevent chemotherapy induced N/V. Will await orders from Dr. Baker following appointment     MDD  PARIS  Follows with Psychiatry and Psychology as outpatient. Seeing Health Psychology during recent admission and TCU stay. Was started on Seroquel 25mg BID + 50mg at bedtime and Doxepin 3mg at bedtime in 4/2021 for anxiety/insomnia, which anxiety has now improved. Given concern for daytime sedation, discontinued Doxepin on 6/15 and discontinued Seroquel 25mg BID scheduled on 6/17 and decreased bedtime Seroquel to 25mg which significant improvement.   - Health Psychology following weekly at TCU (last seen 6/16)  - Continue PTA Buspar 30 mg BID, Prozac 60 mg daily  - Continue Seroquel 25mg at bedtime      # Bilateral lower extremity DVT s/p IVC filter (4/16/21)  # History of right retroperitoneal hematoma (4/14/21)   Ultrasound 3/29 revealed BLE DVTs. CT negative for PE. Started on IV heparin, transitioned to Lovenox 70 mg. On 4/14 developed spontaneous retroperitoneal bleed requiring 4 units PRBCs. IVC filter placed 4/16. Vascular surgery consulted at that time - and repeat CT with stable area of bleeding, therefore no need for surgical intervention. Eventually resumed on Lovenox 40 mg daily for prophylaxis. Per staff message with Dr. Baker, recommend Lovenox 1mg/kg BID vs xarelto 20 mg daily. Pt and family elected to wait until formal discussion with Dr. Baker at clinic  appointment today before making any anticoagulation changes  - Continue Lovenox subcutaneous 40 mg daily for DVT ppx for now until patient discussion with Dr. Baker at clinic today, will likely increase to Lovenox 1mg/kg BID vs xarelto 20 mg daily   - CBC qMTh  - Continue IVC filter  - Follow up with Heme/Onc as discussed today     Dysphagia. SLP following. Had VFSS 4/21 and 5/26 showing silent aspiration. Repeat study 6/11 with no aspiration or flash penetration. Progressed to DD2 diet w/ thin liquids 6/12 and DD3 diet with thin liquids on 6/17.   - SLP following  - DD3 with thin liquids     # Goals of care:   Initial plan was for patient to discharge to daughter's home with home cares. However, daughter La Nena expressed concerns regarding having enough support at home for this, as well as her home having stairs. Had care conference today. May need to consider LTC facility pending course. PT/OT dates extended.      Stable medical issues:  GERD. Continue PTA PPI BID  Mild intermittent asthma. Continue Albuterol PRN  HTN. Continue PTA Amlodipine 5mg daily and Lasix 40mg daily with hold parameters  Slow transit constipation. Having regular bowel movements. No acute concerns.Continue PTA Linzess, Miralax and Simethicone, fiber supplementation daily.        Resolved problems:  Sedation. Had concerns for increased intermittent daytime sleepiness on 6/15-6/16. Improved with medication adjustments: Discontinuing Doxepin, discontinuing daytime Seroquel and decreasing Seroquel dose at bedtime from 50mg to 25mg. No infectious s/sx, UA negative. EEG negative for seizures on 6/16. No focal neurological deficits. Mentation at baseline. Monitor.  Recent PJP pneumonia. [facility]. Now on bactrim once daily. Continue.    Recent UTI. Completed course of oral cefdinir on 5/31.       Diet and/or tube feedings: DD3 with thin liquids  DVT/GI prophylaxis: Enoxaparin (Lovenox) SQ  Indications for  "psychotropic medications: Buspar, prozac, seroquel and lowest effective dose for MDD and PARIS  Code status discussed on admission: Full Code    Mayra Leija PA-C  Hospitalist Service  [phone]         Consults:   none         Discharge Plannin/28 to daughters house vs LTC once PT/OT and SW plan in place        Interval History:   Complains of difficulty initiating sleep at night. No other physical complaints. Less drowsy after med adjustments. Appetite fair. Care conference today with social work, PT, OT and daughter and son on the line. Plan to extend TCU stay as family plans for home vs LTAC dispo.          Physical Exam:   Blood pressure (!) 143/70, pulse 85, temperature 96.3  F (35.7  C), temperature source Oral, resp. rate 18, height 1.6 m (5' 2.99\"), weight 68.9 kg (151 lb 12.8 oz), SpO2 93 %.  GENERAL: Awake. Appears to be resting comfortably. NAD.   HEENT: NC/AT. Anicteric sclera. Mucous membranes moist.  CV: RRR, S1S2. No appreciable murmurs, rubs, or gallops.   RESPIRATORY: Normal respiratory effort on RA. Lungs CTAB without wheezes, rales or rhonchi.   GI: Soft, non-tender, and non-distended with bowel sounds present in all quadrants.  EXTREMITIES: No peripheral edema. Warm & well perfused.  NEUROLOGIC: Alert and orientated x 3. CN II-XII grossly intact. No focal deficits. Pronator drift in R hand, unchanged. Some word finding difficulty.   MUSCULOSKELETAL: No joint swelling or tenderness.   SKIN: No jaundice. No rashes or lesions.       "

## 2021-06-22 NOTE — PLAN OF CARE
Patient sleeping during safety rounds, denies pain and no sign of respiratory distress noted. Has pure wick external cath in place and no BM this shift. Continue current plan of care.      Patient's most recent vital signs are:     Vital signs:  BP: 120/75  Temp: 96.9  HR: 102  RR: 16  SpO2: 95 %     Patientdoes not have new respiratory symptoms.  Patient does not have new sore throat.  Patient does not have a fever greater than 99.5.

## 2021-06-22 NOTE — PROGRESS NOTES
"Oral Chemotherapy Monitoring Program    Lab Monitoring Plan  CBC and CMP every two weeks.  Subjective/Objective:  Olga Bailey is a 75 year old female contacted by phone for an initial visit for oral chemotherapy education.  Spoke with Olya Lyman at the TCU about the start of temozolomide. Olya Lyman states that they are unable to use outside temozolomide filled by [facility] because they are able to supply the temozolomide themselves. She states that they will set this supply aside and send it with patient once she discharges at a later date. Olya Lyman and team will need new orders on the TCU MAR for temozolomide, ondansetron and any additional bowel meds (currently has miralax and fibercon) with detailed instructions and to start on 6/24/21. Reviewed to start the zofran and temozolomide at bedtime on 6/24 and that it should be given 2 hours after a meal on an empty stomach. Reviewed to watch for constipation. Olya Lyman aware that this will only be given for 5 nights with a 23 night break prior to the next cycle.    ORAL CHEMOTHERAPY 3/26/2021 6/22/2021   Assessment Type New Teach New Teach   Diagnosis Code Brain Cancer - Glioblastoma Brain Cancer - Glioblastoma   Providers Samuel Baker   Clinic Name/Location [facility]   Drug Name Temodar (temozolomide) Temodar (temozolomide)   Dose 140 mg 250 mg   Current Schedule Daily Daily   Cycle Details Continuous for 42 days during XRT 5 days on, 23 days off   Planned next cycle start date - 6/24/2021   Any new drug interactions? - No   Is the dose as ordered appropriate for the patient? - Yes       Last PHQ-2 Score on record: No flowsheet data found.    Vitals:  BP:   BP Readings from Last 1 Encounters:   06/22/21 (!) 143/70     Wt Readings from Last 1 Encounters:   06/19/21 68.9 kg (151 lb 12.8 oz)     Estimated body surface area is 1.75 meters squared as calculated from the following:    Height as of 6/18/21: 1.6 m (5' 2.99\").    Weight as of an earlier encounter on 6/22/21: " 68.9 kg (151 lb 12.8 oz).    Labs:  _  Result Component Current Result Ref Range   Sodium 139 (6/22/2021) 133 - 144 mmol/L     _  Result Component Current Result Ref Range   Potassium 4.3 (6/22/2021) 3.4 - 5.3 mmol/L     _  Result Component Current Result Ref Range   Calcium 10.0 (6/22/2021) 8.5 - 10.1 mg/dL     _  Result Component Current Result Ref Range   Magnesium 1.7 (6/17/2021) 1.6 - 2.3 mg/dL     _  Result Component Current Result Ref Range   Phosphorus 3.6 (6/17/2021) 2.5 - 4.5 mg/dL     _  Result Component Current Result Ref Range   Albumin 3.5 (6/22/2021) 3.4 - 5.0 g/dL     _  Result Component Current Result Ref Range   Urea Nitrogen 12 (6/22/2021) 7 - 30 mg/dL     _  Result Component Current Result Ref Range   Creatinine 0.53 (6/22/2021) 0.52 - 1.04 mg/dL     _  Result Component Current Result Ref Range   AST 43 (6/22/2021) 0 - 45 U/L     _  Result Component Current Result Ref Range   ALT 36 (6/22/2021) 0 - 50 U/L     _  Result Component Current Result Ref Range   Bilirubin Total 0.3 (6/22/2021) 0.2 - 1.3 mg/dL     _  Result Component Current Result Ref Range   WBC 7.3 (6/22/2021) 4.0 - 11.0 10e9/L     _  Result Component Current Result Ref Range   Hemoglobin 12.8 (6/22/2021) 11.7 - 15.7 g/dL     _  Result Component Current Result Ref Range   Platelet Count 377 (6/22/2021) 150 - 450 10e9/L     _  Result Component Current Result Ref Range   Absolute Neutrophil 5.7 (6/22/2021) 1.6 - 8.3 10e9/L       Assessment:  Patient is appropriate to start therapy.    Plan:  Basic chemotherapy teaching was reviewed with the patient's caregiver including indication, start date of therapy, dose, administration, adverse effects, missed doses, food and drug interactions, monitoring, side effect management, office contact information, and safe handling. Written materials were provided and all questions answered.    Follow-Up:  6/23 to make sure that all meds are being added to TCU MAR. Then call to check in next week to see  how she is tolerating this first cycle.     Brigid Mcintyre PharmD  June 22, 2021

## 2021-06-22 NOTE — PROGRESS NOTES
06/08/21 1200   General Information   Onset of Illness/Injury or Date of Surgery 06/08/21   Referring Physician TERA Henriquez   Patient/Family Therapy Goal Statement (SLP) Advance diet   Pertinent History of Current Problem Olga Bailey is a 75 year old female with a history of glioblastoma s/p resection (2/23/21), HTN, GERD, asthma, depression, anxiety. She was initially admitted to [facility] from 4/26 to 5/15 for acute hypoxic respiratory failure 2/2 PJP pneumonia. She was discharged to  TCU but required re-admission to [facility] on 5/26 for seizure activity. Her Keppra dose was increased, steroids were started, and she has since remained seizure free. She now transfers back to TCU for physical rehabilitation.    General Observations Patient being seen by speech therapy prior to acute care hospitalization and seem to be close to repeating VFSS.  Over course of acute care hospitalization, was not seen by speech therapy.     Past History of Dysphagia Patient has been seen by speech therapy for dysphagia management over the course of the past 6 weeks   Type of Evaluation   Type of Evaluation Swallow Evaluation   Oral Motor   Oral Musculature generally intact   Structural Abnormalities none present   Dentition (Oral Motor)   Dentition (Oral Motor) natural dentition   Facial Symmetry (Oral Motor)   Facial Symmetry (Oral Motor) WNL   Lip Function (Oral Motor)   Lip Range of Motion (Oral Motor) WNL   Tongue Function (Oral Motor)   Tongue ROM (Oral Motor) WNL   Cough/Swallow/Gag Reflex (Oral Motor)   Volitional Throat Clear/Cough (Oral Motor) WNL   Volitional Swallow (Oral Motor) WNL   General Swallowing Observations   Current Diet/Method of Nutritional Intake (General Swallowing Observations, NIS) nectar-thick liquids;dysphagia level 2 (mechanically altered)   Swallowing Evaluation Clinical swallow evaluation   Clinical Swallow Evaluation   Feeding Assistance no assistance needed   Clinical Swallow Evaluation Textures  Trialed Thin Liquids;Nectar-Thick Liquids;Puree Textures;Semi-Solid;Solid Foods   Clinical Swallow Eval: Thin Liquid Texture Trial   Mode of Presentation, Thin Liquids cup;spoon;self-fed   Volume of Liquid or Food Presented Half to 1 teaspoon sized boluses   Oral Phase of Swallow WFL   Pharyngeal Phase of Swallow intact   Diagnostic Statement No overt signs and symptoms of aspiration or changes in vocal quality.  Patient does have a history of silent aspiration per prior VFSS on 5/21   Clinical Swallow Evaluation: Nectar-Thick Liquid Texture Trial   Mode of Presentation, Nectar cup;self-fed   Volume of Nectar Presented Single swallows   Oral Phase, Nectar WFL   Pharyngeal Phase, [place] intact   Diagnostic Statement No overt signs and symptoms of aspiration or changes in vocal quality.  Did have mild throat clear x1 otherwise no difficulties noted   Clinical Swallow Evaluation: Puree Solid Texture Trial   Mode of Presentation, Puree spoon;self-fed   Volume of Puree Presented Tablespoon sized boluses   Oral Phase, Puree WFL   Pharyngeal Phase, Puree intact   Diagnostic Statement Tolerated without any overt difficulties noted   Clinical Swallow Evaluation: Semisolid Texture Trial   Mode of Presentation, Semisolid spoon;self-fed   Volume of Semisolid Food Presented Tablespoon sized boluses   Oral Phase, Semisolid WFL   Pharyngeal Phase, Semisolid intact   Diagnostic Statement Tolerated softer textures without overt difficulties noted   Clinical Swallow Evaluation: Solid Food Texture Trial   Mode of Presentation, Solid self-fed   Volume of Solid Food Presented Teaspoon sized boluses   Oral Phase, Solid WFL   Pharyngeal Phase, Solid intact   Diagnostic Statement tolerated without difficulties noted.    Esophageal Phase of Swallow   Patient reports or presents with symptoms of esophageal dysphagia No   Swallowing Recommendations   Diet Consistency Recommendations nectar-thick liquids;dysphagia level 2 (mechanically  altered)   Supervision Level for Intake patient independent   Mode of Delivery Recommendations bolus size, small   Swallowing Maneuver Recommendations alternate food and liquid intake   Monitoring/Assistance Required (Eating/Swallowing) monitor for cough or change in vocal quality with intake   Recommended Feeding/Eating Techniques (Swallow Eval) patient is independent, no specific recommendations   SLP Therapy Assessment/Plan   Criteria for Skilled Therapeutic Interventions Met (SLP Eval) yes   SLP Diagnosis mild dysphagia   Rehab Potential (SLP Eval) good, to achieve stated therapy goals   Therapy Frequency (SLP Eval) 3 times/wk   Predicted Duration of Therapy Intervention (SLP Eval) 3 weeks   Activity Limitations Related to Problem List (SLP) altered diet   Comment, Therapy Assessment/Plan (SLP) Overall seems to be tolerating current NDD-2 texture diet and nectar thick liquids without overt signs symptoms or changes in vocal quality.  Did have mild throat clear x1 with nectar thick liquids.  Limited trials of ice chips and thin liquids tolerated without overt signs and symptoms of aspiration.  Discussed timing for repeat VFSS to be completed later this week or early next. Does show good potential for diet advancement   Therapy Plan Review/Discharge Plan (SLP)   Therapy Plan Review (SLP) evaluation/treatment results reviewed;care plan/treatment goals reviewed;participants included;patient   Therapy Certification   Start of Care Date 06/08/21   Certification date from 06/08/21   Certification date to 07/08/21   Medical Diagnosis glioblastoma    Total Evaluation Time   Total Evaluation Time (Minutes) 20

## 2021-06-22 NOTE — LETTER
6/22/2021         RE: Olga Bailey  [address] [facility] [address]        Dear Colleague,    Thank you for referring your patient, Olga Bailey, to the [facility] [place]. Please see a copy of my visit note below.    NEURO-ONCOLOGY VISIT  Jun 22, 2021    CHIEF COMPLAINT: Ms. Olga Bailey is a 75 year old right-handed woman with a left frontoparietal glioblastoma (IDH wild-type, MGMT promoter methylated), diagnosed following gross total resection on 2/23/2021. Initiation of upfront cancer-directed treatment was delayed due to a complicated hospitalization. Given a resolving infection and lowered functional status, radiation alone was initiated on 5/4/2021 and will be completed on 5/27/2021. The plan is to initiate adjuvant temozolomide.     Accompanying her to this visit is Rita (daughter).     HISTORY OF PRESENT ILLNESS  -Olga was admitted in the end of May for a recurrent seizure. Dexamethasone restarted and has since been tapered off. Tolerated taper.   -She is residing in rehab and again, clinically much improved as compared to a few weeks ago. Participating well with therapies. She can walk on her own with a walker 130 feet, minimal assistance for standing. No longer needing a lift. Care conference with extension of rehab to 7/9/2021. After rehab, uncertain plans; possibly living with Rita if she can provide for mom or may need longer term care.   -Energy has been improving.  -Mental status stable, more slowed/ mild confusion. She continues to note only mild aphasia. Previously working with speech therapy, but no longer per Olga.   -Left V2 division facial pain nightly when going to bed. Sharp pain lasting until she falls asleep. 8/10 in intensity.   -Headaches, resolved.  -Denies changes in sensation.  -Off dexamethasone.   -On Keppra 1250 mg BID, no recurrent seizures. Episode of possible oral automatisms and drowsiness for which a 30 minute EEG  was performed 6/16. Slowing, but negative for epileptiform activity or seizures. Medication changes were made including discontinued AM seroquel and reduced evening seroquel with improvement in cognition.      REVIEW OF SYSTEMS  A comprehensive ROS negative except as in HPI.    MEDICATIONS   No current facility-administered medications for this visit.      Current Outpatient Medications   Medication     temozolomide (TEMODAR) 250 MG capsule     Facility-Administered Medications Ordered in Other Visits   Medication     - Skin Test Reading -     acetaminophen (TYLENOL) tablet 650 mg     albuterol (PROAIR HFA/PROVENTIL HFA/VENTOLIN HFA) 108 (90 Base) MCG/ACT inhaler 2 puff     amLODIPine (NORVASC) tablet 5 mg     busPIRone (BUSPAR) tablet 30 mg     calcium carbonate (TUMS) chewable tablet 1,000 mg     calcium polycarbophil (FIBERCON) tablet 625 mg     carboxymethylcellulose PF (REFRESH PLUS) 0.5 % ophthalmic solution 1 drop     enoxaparin ANTICOAGULANT (LOVENOX) injection 40 mg     FLUoxetine (PROzac) capsule 60 mg     furosemide (LASIX) tablet 40 mg     levETIRAcetam (KEPPRA) tablet 1,250 mg     linaclotide (LINZESS) capsule 290 mcg     melatonin tablet 3 mg     menthol (ICY HOT) 5 % patch 1 patch    And     menthol (ICY HOT) Patch in Place     Nurse may request from Pharmacy a change of form of medication (e.g. Liquid to tablet).     ondansetron (ZOFRAN-ODT) ODT tab 4 mg     pantoprazole (PROTONIX) EC tablet 40 mg     polyethylene glycol (MIRALAX) Packet 17 g     QUEtiapine (SEROquel) tablet 25 mg     simethicone (MYLICON) chewable tablet 80 mg     sulfamethoxazole-trimethoprim (BACTRIM) 400-80 MG per tablet 1 tablet     tuberculin injection 5 Units     Current Outpatient Medications   Medication Sig Dispense Refill     temozolomide (TEMODAR) 250 MG capsule Take 1 capsule (250 mg) by mouth daily for 5 days Take ondansetron 30-60 min before temozolomide. Take at bedtime on an empty stomach. 5 capsule 0     DRUG  ALLERGIES   Allergies   Allergen Reactions     Bacitracin Rash     Bactroban is effective; No difficulties     Erythromycin      intolerant.     Meperidine Hcl      intolerant only   Demerol     Chloraprep One Step Rash     IMMUNIZATIONS   Immunization History   Administered Date(s) Administered     COVID-19,PF,Moderna 03/18/2021     Flu 65+ Years 09/28/2020     HepB 01/01/1990     Influenza (IIV3) PF 01/01/2001     Influenza, Quad, High Dose, Pf, 65yr + 09/28/2020     Pneumococcal 23 valent 07/22/2020     Tetanus 06/13/2004     Zoster vaccine recombinant adjuvanted (SHINGRIX) 12/24/2019, 10/12/2020     ONCOLOGIC HISTORY  -2/2021 PRESENTATION: Progressive aphasia.   -2/19/2021 MR brain imaging with 3.3 x 2.8 x 2.8 cm enhancing mass in left frontal-parietal region. A second contrast enhancing lesion (0.5 x 1.0 x 1.0 cm) in right occipital lobe which is dural based and largely stable in size since 9/2011; likely representing a meningioma.  -2/23/2021 SURGERY: Gross total resection by Dr. Cummings  PATHOLOGY: Glioblastoma; IDH1-R132H wild-type/ IDH 1 and 2 wildtype, MGMT promoter methylated. Not BRAF mutated.   -3/23/2021 NEURO-ONC: Recommending chemoradiotherapy.   -3/2021 ADMISSION: SOB and chest pain; CT PA negative, but bilateral DVT noted on U/S. Started on Lovenox. Acute hypoxic respiratory failure in the setting of bilateral pneumonia; presumed PJP, concern for transfusion-related acute lung injury and right hemidiaphragm paralysis. Seizure. Right retroperitoneal hematoma. Ileus. Non-severe malnutrition on TPN with concern for refeeding syndrome. 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 Shock Liver.  -5/4 - 5/27/2021 RADS: 15 fractions.   -5/25/2021 NEURO-ONC/ DEVICE: Discussed the role of Optune; to be considered. Repeat MRI in 4 weeks with plans to start adjuvant temozolomide.   -6/22/2021 NEURO-ONC/ MRB/ CHEMO: Clinically improving. Imaging largely stable. Starting adjuvant temozolomide 150mg/m2 (250mg), cycle 1  "(start date 6/24).     SOCIAL HISTORY   History   Smoking Status     Never Smoker   Smokeless Tobacco     Never Used    Social History    Substance and Sexual Activity      Alcohol use: Yes        Comment: very rare     History   Drug Use No       PHYSICAL EXAMINATION  /82 (BP Location: Left arm, Patient Position: Sitting, Cuff Size: Adult Regular)   Pulse 100   Temp 97.8  F (36.6  C) (Oral)   Resp 16   Wt 68.9 kg (151 lb 12.8 oz)   SpO2 95%   BMI 26.90 kg/m    Wt Readings from Last 2 Encounters:   06/19/21 68.9 kg (151 lb 12.8 oz)   06/22/21 68.9 kg (151 lb 12.8 oz)      Ht Readings from Last 2 Encounters:   06/18/21 1.6 m (5' 2.99\")   05/26/21 1.6 m (5' 3\")     KPS: 60    -Generally well appearing. Slightly fatigued.   -Respiratory: No increased work of breathing.  -Skin: No facial rashes   -Psychiatric: Normal mood and affect. Pleasant, talkative.  -Neurologic:   MENTAL STATUS:     Alert, oriented to month, but not day (7) or year.    Recall: Intact.     Speech largely fluent. Mild aphasia with paraphasic error especially  With challenging words cuticle.   Comprehension intact to multi-step commands.   Good right-left orientation.     CRANIAL NERVES:     Pupils are equal, round.     Extraocular movements full, denies diplopia.     Visual fields full.     Facial sensation intact to light touch.   No facial droop today.   Hearing slightly decreased bilaterally.   Normal phonation.   MOTOR: No pronation or drift. (Pronation at baseline on right; related to shoulder).   SENSATION: Intact to light touch throughout.  COORDINATION: Slight impairment on finger-nose with eyes open and closed on the right.  GAIT: Sitting in wheelchair, uses walker, which is not present and did not test.     MEDICAL RECORDS  Obtained and personally reviewed all available outside medical records in addition to reviewing any records available in our electronic system.     LABS  Personally reviewed all available lab results. "     IMAGING  Personally reviewed MR brain imaging from today and compared to pre-radiation imaging. To my eye, there is interval subtle (2mm) increase in size of a enhancing nodule superior to resection cavity concerning for residual cancer.      Imaging was shown to and results were reviewed with Olga and La Nena.     Imaging personally reviewed with Dr. Spann.        IMPRESSION  Clinic time for this high complexity encounter was spent discussing in detail the nature of her cancer, providing emotional support, answering questions pertaining to my recommendations, and devising the plan as outlined below.     Clinically, I am again pleased with how well Olga is improving with the assistance of therapies and further optimal medical management. She is off dexamethasone. Energy is improving. She is able to walk with a walker and no longer needing a lift, which is excellent progress.     I have been in communication with her team in rehab regarding medical management. One question being the need for life-long anticoagulation. Currently, Olga is on ppx dosed Lovenox. While Olga previously had a bleed, direct contraindications to anticoagulation include active pathological bleeding, which is not occurring. 10a inhibitors should be used in caution in the elderly due to impaired clearance, so it would be my thought to continue with Lovenox at therapeutic dosing (1mg/kg BID). However, Olga is not liking the daily injections due to discomfort. Given her complicated medical background and the continued high risk for clot development given her cancer diagnosis with the potential of IVC filter failure, thus the indication for life-long anticoagulation, but also the potential for pancytopenia that can be seen with chemotherapy use, I will place a consult to hem/ onc for additional guidance.     With regard to cancer-directed therapy, a multimodal treatment approach first involves maximal surgical resection,  followed by upfront treatment, which in Olga's case, was with radiotherapy alone due to her complex medical situation at the time. Now, the plan is to initiate adjuvant temozolomide. In the adjuvant phase, temozolomide is typically dosed at 150mg/m2 for days 1-5 of a 28 day cycle for the first cycle. If this dosing is not well tolerated, can switch to metronomic dosing; 75mg/m2 daily. The previously reviewed common side effects of temozolomide can still be anticipated and were discussed as including, but not limited to, fatigue, nausea, and constipation. Bone marrow suppression can result in leukopenia and thrombocytopenia.     Imaging with subtle changes as detailed above. Will repeat in 2 months.     Personally reviewed labs from today; no concerning results.     Holding on the option of Optune at this time.     PROBLEM LIST  Glioblastoma  Aphasia  Apraxia    PLAN  -CANCER DIRECTED THERAPY-  Will initiate adjuvant temozolomide at 150mg/m2 (250 mg), cycle 1 (start date of 6/24).   -Instructed to take temozolomide in the evening on an empty stomach, 30 minutes after Zofran dosing.  -Supportive medications; Zofran and bowel regimen.   -Currently on Bactrim.   -Repeat 28 day cycle if WBC >= 3, ANC >= 1.5, HgB >= 10, and platelets >= 100.    -Surveillance labs reviewed, at goal for chemotherapy.   -Will continue every 2 week CBC and CMP every 4 weeks.    -Post-radiation imaging with slight interval increase in enhancing parenchymal nodule superior to the resection cavity. It was seen previous imaging, but appears slightly larger compared to previous. It does not change current plan for chemotherapy with temozolomide and we will continue to initiate. Repeat imaging in 2 months, prior to cycle 3.    -STEROIDS-  -Off dexamethasone.    -SEIZURE MANAGEMENT-  -Given history of seizures, antiepileptics are indicated.   -Continue Keppra.     -DVT-  -Requires lifelong anticoagulation.   -Referral to hem/onc.   -Will need to  "consider removal of the IVC filter.     -Quality of life/ MOOD/ FATIGUE-  -Denies any mood issues.  -Continue to monitor mood as untreated/ undertreated depression can worsen fatigue, dysorexia, and quality of life.    -OTHER-  -Recommended that Olga receive her second COVID vaccination.     Return to clinic in 1 month + labs.     In the meantime, Olga and La Nena know to call with questions or concerns or to report new complaints and can be seen sooner if needed.    Stephanie Baker MD  Neuro-oncology      Oncology Rooming Note    June 22, 2021 2:50 PM   Olga Bailey is a 75 year old female who presents for:    Chief Complaint   Patient presents with     Oncology Clinic Visit     GBM (glioblastoma multiforme) (H)     Initial Vitals: /82 (BP Location: Left arm, Patient Position: Sitting, Cuff Size: Adult Regular)   Pulse 100   Temp 97.8  F (36.6  C) (Oral)   Wt 68.9 kg (151 lb 12.8 oz)   SpO2 95%   BMI 26.90 kg/m   Estimated body mass index is 26.9 kg/m  as calculated from the following:    Height as of 6/18/21: 1.6 m (5' 2.99\").    Weight as of this encounter: 68.9 kg (151 lb 12.8 oz). Body surface area is 1.75 meters squared.  No Pain (0) Comment: Data Unavailable   No LMP recorded. Patient has had a hysterectomy.  Allergies reviewed: Yes  Medications reviewed: Yes    Medications: Medication refills not needed today.  Pharmacy name entered into [facility]:    [id number] [employer] - [place], [place] - [address] [id number] - [place], [place] - [address] [facility]  & [facility] #[id number] - [place], [place] - [address] AT [facility] OF [address]  [facility] - [place], [place] - [address]    Clinical concerns: no     Paula Turner [facility]                  Again, thank you for allowing me to participate in the care of your patient.        Sincerely,        Stephanie Baker MD    "

## 2021-06-22 NOTE — PLAN OF CARE
Discharge Planner Post-Acute Rehab PT:      Discharge Plan: home with daughter's assist, not 24 hour is pt's goal     Precautions: fall, hx of recent seizures, swallowing     Current Status:  Bed Mobility: cga to min A, Mod assist to scoot hips  Transfer: cga to min A stand pivot with FWW  Gait: 150 feet with FWW Gerard and w/c follow for safety  Stairs: 6 stairs, 2 rails, Gerard  Balance: cga to occasional min A when fatigued.     Assessment: Care conference later this am; pt had watery bowel incontinence all over pad and bedding. NA assisted pt with clean up. -15 min. O2 sats at 92% on RA with nustep use this am.    6/22: Attended medical team care conference with pt present along with CAMRON, PA, OT and DON, pt's daughter and son present via telephone. Update given on progress towards PT goals. Recommend pt have 24/7 care d/t limited insight into safety awareness and fatigue levels with mobility making pt at risk for falls if left alone for periods during the day. Extended PT POC date to further progress functional goals in hopes of showing a consistent decrease in the amount of physical care needed however 24/7 care will most likely continue to be needed d/t pt's memory deficits. CAMRON working with pt and her family to discuss next steps, whether home to her daughter's or her senior care with hired in help or looking into another facility/    6/20: Skin tear noted on anterior lower leg at the beginning of the session (unknown mechanism prior to PT session) RN assisted pt post.       Other Barriers to Discharge (DME, Family Training, etc): functional tolerance, medical complexity, functional status, neuro involvement

## 2021-06-22 NOTE — CARE CONFERENCE
Care Conference Summary    Patient, PA-C, PT, OT, SW present in room.   Pt's daughter La Nena and son Sudheer and ALON attended via speakerphone.    PA provided and update on recent medication changes that were made in an attempt to reduce pt's drowsiness; pt has responded positively. Pt may benefit from melatonin to assist falling asleep at night. PA shared information about upcoming oncology appointments.     OT and PT informed pt's family that pt is highly motivated but remains variable with her progress. Pt still needs assistance dressing and cues for safety and avoiding obstacles. Pt has been walking with her walker but is prone to fatigue and sometimes leans to her left instead of taking a rest. Pt will begin working on steps this week, but supervision for all mobility tasks requires either assistance or very close supervision.     PT and OT recommending 24/7 supervision if pt was discharged to the community at this time. Therapists believe that there is more progress to be made, but pt still has significant needs for ADL and IADL assistance. Pt may be at a new cognitive baseline with regard to the need for verbal cues and awareness of instability.     SW informed pt and her family that a home care agency would only address a portion of the needs pt has and that if pt returned to her senior living community she would need significant volunteer caregiving support and/or private pay PCAs. SW discussed this would likely be the case at dtr's home as well. CAMRON validated pt and family concerns about avoiding long-term care if possible, but that it is important to consider.     Pt's daughter asked if long-term care facilities would accept pt if she is still on oral chemotherapy. CAMRON stated that this is something we can find out when we make referrals. Family's goal is to continue to pursue cancer treatment. CAMRON recommended discussing pt's current condition and response to rehabilitation with oncology providers.    TCU  staff answered all pt and family questions.    SW continued to converse with Sudheer and La Nena after the care conference to explain the referral process for long-term care facilities, as well as the referral process for home care agencies and the fact that home care does not provide supervisory care. SW offered to collect and send some resources about private pay caregiver options to family members.     SW validated the difficulties of these choices for family members, especially when they are complicated by COVID visitation limitations. SW recommended that pt's family continue to discuss pt's goals and desires with her and maintain open communication.     SW will continue to remain available for patient and family support, discharge planning, other resources and support PRN.    Frederic SORTO, [facility]   Phone: [phone]

## 2021-06-23 ENCOUNTER — APPOINTMENT (OUTPATIENT)
Dept: OCCUPATIONAL THERAPY | Facility: SKILLED NURSING FACILITY | Age: 76
End: 2021-06-23
Payer: MEDICARE

## 2021-06-23 ENCOUNTER — APPOINTMENT (OUTPATIENT)
Dept: PHYSICAL THERAPY | Facility: SKILLED NURSING FACILITY | Age: 76
End: 2021-06-23
Payer: MEDICARE

## 2021-06-23 LAB — PLATELET # BLD AUTO: 294 10E9/L (ref 150–450)

## 2021-06-23 PROCEDURE — 97110 THERAPEUTIC EXERCISES: CPT | Mod: GO

## 2021-06-23 PROCEDURE — 97530 THERAPEUTIC ACTIVITIES: CPT | Mod: GP | Performed by: PHYSICAL THERAPIST

## 2021-06-23 PROCEDURE — 85049 AUTOMATED PLATELET COUNT: CPT | Performed by: PHYSICIAN ASSISTANT

## 2021-06-23 PROCEDURE — 97112 NEUROMUSCULAR REEDUCATION: CPT | Mod: GP | Performed by: PHYSICAL THERAPIST

## 2021-06-23 PROCEDURE — 250N000013 HC RX MED GY IP 250 OP 250 PS 637: Performed by: PHYSICIAN ASSISTANT

## 2021-06-23 PROCEDURE — 36415 COLL VENOUS BLD VENIPUNCTURE: CPT | Performed by: PHYSICIAN ASSISTANT

## 2021-06-23 PROCEDURE — 90837 PSYTX W PT 60 MINUTES: CPT | Performed by: PSYCHOLOGIST

## 2021-06-23 PROCEDURE — 120N000009 HC R&B SNF

## 2021-06-23 PROCEDURE — 97535 SELF CARE MNGMENT TRAINING: CPT | Mod: GO

## 2021-06-23 PROCEDURE — 97116 GAIT TRAINING THERAPY: CPT | Mod: GP | Performed by: PHYSICAL THERAPIST

## 2021-06-23 RX ORDER — DOCUSATE SODIUM 100 MG/1
100 CAPSULE, LIQUID FILLED ORAL 3 TIMES DAILY
Status: DISCONTINUED | OUTPATIENT
Start: 2021-06-23 | End: 2021-07-02

## 2021-06-23 RX ORDER — TEMOZOLOMIDE 250 MG/1
250 CAPSULE ORAL AT BEDTIME
Status: DISCONTINUED | OUTPATIENT
Start: 2021-06-24 | End: 2021-06-25

## 2021-06-23 RX ORDER — ONDANSETRON 4 MG/1
4 TABLET, FILM COATED ORAL DAILY
Status: DISCONTINUED | OUTPATIENT
Start: 2021-06-24 | End: 2021-06-25

## 2021-06-23 RX ORDER — SENNOSIDES 8.6 MG
2 TABLET ORAL AT BEDTIME
Status: DISCONTINUED | OUTPATIENT
Start: 2021-06-23 | End: 2021-07-02

## 2021-06-23 RX ORDER — TEMOZOLOMIDE 250 MG/1
250 CAPSULE ORAL DAILY
Status: DISCONTINUED | OUTPATIENT
Start: 2021-06-24 | End: 2021-06-23

## 2021-06-23 RX ADMIN — PANTOPRAZOLE SODIUM 40 MG: 40 TABLET, DELAYED RELEASE ORAL at 17:02

## 2021-06-23 RX ADMIN — FLUOXETINE HYDROCHLORIDE 60 MG: 20 CAPSULE ORAL at 08:07

## 2021-06-23 RX ADMIN — RIVAROXABAN 15 MG: 15 TABLET, FILM COATED ORAL at 17:02

## 2021-06-23 RX ADMIN — BUSPIRONE HYDROCHLORIDE 30 MG: 15 TABLET ORAL at 21:05

## 2021-06-23 RX ADMIN — QUETIAPINE FUMARATE 25 MG: 25 TABLET ORAL at 21:05

## 2021-06-23 RX ADMIN — PANTOPRAZOLE SODIUM 40 MG: 40 TABLET, DELAYED RELEASE ORAL at 06:12

## 2021-06-23 RX ADMIN — FUROSEMIDE 40 MG: 40 TABLET ORAL at 08:12

## 2021-06-23 RX ADMIN — AMLODIPINE BESYLATE 5 MG: 5 TABLET ORAL at 08:12

## 2021-06-23 RX ADMIN — CALCIUM POLYCARBOPHIL 625 MG: 625 TABLET, FILM COATED ORAL at 08:07

## 2021-06-23 RX ADMIN — LEVETIRACETAM 1250 MG: 750 TABLET, FILM COATED ORAL at 21:05

## 2021-06-23 RX ADMIN — ACETAMINOPHEN 650 MG: 325 TABLET, FILM COATED ORAL at 23:44

## 2021-06-23 RX ADMIN — BUSPIRONE HYDROCHLORIDE 30 MG: 15 TABLET ORAL at 08:13

## 2021-06-23 RX ADMIN — LEVETIRACETAM 1250 MG: 750 TABLET, FILM COATED ORAL at 08:07

## 2021-06-23 RX ADMIN — LINACLOTIDE 290 MCG: 145 CAPSULE, GELATIN COATED ORAL at 06:12

## 2021-06-23 RX ADMIN — SULFAMETHOXAZOLE AND TRIMETHOPRIM 1 TABLET: 400; 80 TABLET ORAL at 08:07

## 2021-06-23 NOTE — PLAN OF CARE
Discharge Planner Post-Acute Rehab PT:      Discharge Plan: home with daughter's assist, not 24 hour is pt's goal     Precautions: fall, hx of recent seizures, swallowing     Current Status:  Bed Mobility: cga to min A, Mod assist to scoot hips  Transfer: cga to min A stand pivot with FWW  Gait: 150 feet with FWW Gerard and w/c follow for safety  Stairs: 6 stairs, 2 rails, Gerard  Balance: cga to occasional min A when fatigued.     Assessment: Pt with good participation and effort with PT for 45 minutes.  Pt is needing A to sit EOB and takes time to get her equilibrium while sitting EOB.  Pt amb with min A generally, cues for keeping fww closer, and cues for more upright posture.  Pt participated in balance drills, gait training.  Pt had an episode of urinary incontinence, contained in pad, but needing A to get cleaned up, replace depends and cleaning. O2 sats in 90s with activities.   Other Barriers to Discharge (DME, Family Training, etc): functional tolerance, medical complexity, functional status, neuro involvement

## 2021-06-23 NOTE — PLAN OF CARE
Pt is alert and oriented. Can communicate needs effectively. VSS-denies SOB, chest pain and nausea. Assist of 1 with walker and GB. Voids via pure wick. LBM 06/22/21. On a regular diet with good appetite. Fluids promoted. Generalized bruising to BUE and BLE. Skin tear dressing on R shin CDI. C/O intermittent headache, PRN tylenol given x 1-proven effective. Returned to unit 1820 from Oncology appointment. No acute issue or concern reported.     Patient's most recent vital signs are:     Vital signs:  BP: 137/76  Temp: 96.7  HR: 99  RR: 16  SpO2: 95 %     Patient does not have new respiratory symptoms.  Patient does not have new sore throat.  Patient does not have a fever greater than 99.5.

## 2021-06-23 NOTE — CONSULTS
"  Health Psychology                  Clinic    Department of Medicine  Bina Florence, Ph.D., L.P. [phone]                          Clinics and Surgery Center  [facility] Parul Grider, Ph.D.,  L.P. [phone]                 3rd Floor  [place] Mail [address]   Olya Barrientos, Ph.D., L.P. [phone]    [address] Rena Del Castillo, Ph.D., L.P. [phone]            [place] [address]          Moose Marie, Ph.D., ANA MARÍA.BRIANNA., L.P. [phone]      Maribel Grier, Ph.D., L.P. [phone]      Inpatient Health Psychology Consultation*    DOS: 6/23/2021    Clinical Information:  From admission H&P: \"Olga Bailey is a 75 year old female with a history of glioblastoma s/p resection (2/23/21), HTN, GERD, asthma, depression, anxiety. She was initially admitted to [facility] from 4/26 to 5/15 for acute hypoxic respiratory failure 2/2 PJP pneumonia. She was discharged to  TCU but required re-admission to [facility] on 5/26 for seizure activity. Her Keppra dose was increased, steroids were started, and she has since remained seizure free. She now transfers back to TCU for physical rehabilitation.\"  I met Ms Bailey during her [facility] in March, 2021 following resection of glioblastoma. Her mood at that time was quite positive and balanced in the context of this difficult diagnosis and unclear prognosis. It was especially notable in the context of her history of PTSD and depression. She met with [facility] and with Dr Del Castillo from Health Psychology during recent admission prior to TCU, and was reporting a \"roller coaster\" mood as well as panic episodes.  Ms Bailey continues to benefit from both psychotherapy and psychotropic medication, and is in contact with her community psychotherapist. When I attempted to meet with her previously on this TCU admission, she was busy with cares on several occasions " and fatigued on another. Today, she was very awake and alert, and very welcoming of this contact.  Reports that she had a very positive meeting with her neuro-oncologist yesterday, receiving information that she is clinically improving with stable imaging, and that she is recommended to go on to an oral chemotherapy agent. Feeling very pleased, reassured, and a sense of lightness that had not been present previously. Mood reported to be positive. Panic episodes completely resolved even prior to yesterday's positive news. Anxious thinking continues to affect sleep initiation negatively.  Significant focus within conversation on past and present dynamics with ex- and how this is part of the dynamic with her children. Talked at length about her choices of how to set boundaries with her children that would leave her feeling more in control of having or not having any need to process information about her ex-. She was very attentive, interactive during these conversations. Brought up other topics related to her emotional well-being as well as some focus on additional personal history.  Assessment: Ms Bailey is doing well emotionally, with recent boost in her mood and decrease in anxiety following very positive feedback from neuro-oncology yesterday about clinical picture related to glioblastoma and it's response to treatment. She does continue to have difficulty with sleep initiation and was receptive to learning a compassion practice that can be helpful with this. She is in touch with her established community psychotherapist. She would appreciate ongoing contact while she remains on TCU.  Diagnosis:     (F33.0) Major Depressive Disorder, Recurrent Episode, Mild    (F41.1) Generalized Anxiety Disorder       Panic Disorder, resolved  Recommendations/Plan: I will plan to follow Ms Bailey approximately weekly during this TCU admission.  Time Spent with Patient: 63 minutes (In: 1308 Out: 1411)  Service  Provided: Individual psychotherapy   Provider: Bina Florence, Ph.D,    Provider Pager: [phone]   Provider Phone: [phone]        *In accordance with the Rules of the [place] [employer], it is noted that psychological descriptions and scientific procedures underlying psychological evaluations have limitations.  Absolute predictions cannot be made based on information in this report.

## 2021-06-23 NOTE — PLAN OF CARE
Patient up in the chair during meals.denies pain/SON/chest pain.Declined stool softener,states too much poop.Patient's grand kids came to visit.VSS.Pleasant.  Continue with POC.        Patient's most recent vital signs are:     Vital signs:  BP: 124/76  Temp: 96.9  HR: 88  RR: 18  SpO2: 91 %     Patient does not have new respiratory symptoms.  Patient does not have new sore throat.  Patient does not have a fever greater than 99.5.

## 2021-06-23 NOTE — PROGRESS NOTES
"/76 (BP Location: Left arm)   Pulse 99   Temp 96.7  F (35.9  C) (Oral)   Resp 16   Ht 1.6 m (5' 2.99\")   Wt 68.9 kg (151 lb 12.8 oz)   SpO2 95%   BMI 26.90 kg/m  Pt A/O X4. Able to make needs known. Denies chest pain, SOB,lightheadedness,dizziness and nausea and vomiting. Pt slept most of the night during rounds, seems comfortable. Denies any numbness and tingling.AX 1 with walker and galt belt.Incontinent of B/B. DD3 diet, thin liquids. Seizure precaution in place.LBM 6/22/21. Bruised shin with mepilex. Continue with plan of care. Call light within reach.  "

## 2021-06-23 NOTE — PROGRESS NOTES
"Brief Medicine Note    Updated meds per recommendations by Dr. Baker (oncology):    -Temozolomide 250mg daily at bedtime x5 days (starting 6/24)   -Zofran 4mg 30-60 minutes prior to betime Temozolomide dosing and every 8 hours as needed (up to 8mg every 8 hours)   -Senna 8.6mg 2 tablets HS   -Docusate 100 mg TID   (bowel med goal of at least 1 soft BM/day)   - Olga to start Xarelto for anticoagulation; 15 mg twice daily with food for 21 days followed by 20 mg once daily with food.     /76 (BP Location: Right arm)   Pulse 88   Temp 96.9  F (36.1  C) (Oral)   Resp 18   Ht 1.6 m (5' 2.99\")   Wt 68.9 kg (151 lb 12.8 oz)   SpO2 91%   BMI 26.90 kg/m        Mayra Leija PA-C  Hospitalist Service  Pager [phone]      "

## 2021-06-23 NOTE — PLAN OF CARE
"Discharge Planner Post-Acute Rehab OT:      Discharge Plan: Pt reports plan remains to return to her daughter's home with intermittent supervision.  Pt also anticipates continued assistance with finances, medication management, meal prep, general IADL, and bathing.  Pt would need to be independent and safe with toileting and light meal prep.  Pt is progressing but as of tx on 6/17, anticipate she will still need some degree of assist with mobility and ADLs at discharge.      Precautions: falls     Current Status:  ADLs: CGA-Min A sit <> stand with FWW, CGA-Min A ambulation with FWW and w/c follow d/t fatigue, Mod A LB dressing from EOB but min A when reclined in bed, SBA-Min A UB dressing, SBA bed mobility when given extra time.   IADLs: NT  Vision/Cognition: Pt oriented with short term memory deficits present.  Pt reports some difficulty reading as well     Assessment: Th facilitated LE crossing while seated in w/c to improve LE dressing.  Min A to thread socks over toes and pt able to pull up. Also min to SBA to cross legs which pt also did in bed.  Good effort from pt, she is goal directed but physically limited. Pt making slow but steady progress evidenced by improved transfers and mobility as well as improved reaching LE's for LB dressing.  Pt amb with 2WW in OT gym to stand at the arm bike for 1'30\" and then she sat at the arm bike for 7.5 minutes.  Pt worked with dowel and a ball to challenge her sh flex, core and UE strength during seated ex to improve her ADLs and mobility.  Cues to sit off the back of the chair.  Her forward sitting and standing posture persist.  Pt required min to CGA transfer and amb w/ 2WW.  Th had to have pt stand up to reposition as she was leaning to the R in her w/c and could not fix her position while seated due to weakness. Pt amb with 2WW in OT gym to stand at the arm bike for 1'30\" and then she sat at the arm bike for 7.5 minutes.  Pt worked with dowel and a ball to challenge " her sh flex, core and UE strength during seated ex to improve her ADLs and mobility.  Cues to sit off the back of the chair.  Her forward sitting and standing posture persist.  Cont per POC.    Care conference held prior to session with SW, PA, OT, PT, DON, patient, patient's daughter, and patient's son (children were remote). Update give on progress towards OT goals and current functional status. At this time it is recommended for patient to have 24/7 supervision along with hands on assist for functional mobility with FWW, bed mobility, toileting, bathing, and dressing d/t pt's limited insight into safety awareness and fatigue levels during mobility. Extended OT POC to further progress functional goals in order to determine discharge needs and decrease level of assist. SW working with patient and family to discuss next steps and whether patient will return to daughter's home, her current senior care facility with hired in help, or another facility. Updated POC to last day therapy on 7/9.     Other Barriers to Discharge (DME, Family Training, etc): Safety, family training.

## 2021-06-24 ENCOUNTER — VIRTUAL VISIT (OUTPATIENT)
Dept: RADIATION ONCOLOGY | Facility: CLINIC | Age: 76
End: 2021-06-24
Attending: STUDENT IN AN ORGANIZED HEALTH CARE EDUCATION/TRAINING PROGRAM
Payer: MEDICARE

## 2021-06-24 ENCOUNTER — APPOINTMENT (OUTPATIENT)
Dept: OCCUPATIONAL THERAPY | Facility: SKILLED NURSING FACILITY | Age: 76
End: 2021-06-24
Payer: MEDICARE

## 2021-06-24 ENCOUNTER — APPOINTMENT (OUTPATIENT)
Dept: PHYSICAL THERAPY | Facility: SKILLED NURSING FACILITY | Age: 76
End: 2021-06-24
Payer: MEDICARE

## 2021-06-24 DIAGNOSIS — I82.599 CHRONIC DEEP VEIN THROMBOSIS (DVT) OF OTHER VEIN OF LOWER EXTREMITY, UNSPECIFIED LATERALITY (H): Primary | ICD-10-CM

## 2021-06-24 DIAGNOSIS — C71.9 GLIOBLASTOMA (H): Primary | ICD-10-CM

## 2021-06-24 LAB
ANION GAP SERPL CALCULATED.3IONS-SCNC: 8 MMOL/L (ref 3–14)
BASOPHILS # BLD AUTO: 0 10E9/L (ref 0–0.2)
BASOPHILS NFR BLD AUTO: 0.9 %
BUN SERPL-MCNC: 14 MG/DL (ref 7–30)
CALCIUM SERPL-MCNC: 8.7 MG/DL (ref 8.5–10.1)
CHLORIDE SERPL-SCNC: 108 MMOL/L (ref 94–109)
CO2 SERPL-SCNC: 25 MMOL/L (ref 20–32)
CREAT SERPL-MCNC: 0.65 MG/DL (ref 0.52–1.04)
DIFFERENTIAL METHOD BLD: ABNORMAL
EOSINOPHIL # BLD AUTO: 0.5 10E9/L (ref 0–0.7)
EOSINOPHIL NFR BLD AUTO: 10.9 %
ERYTHROCYTE [DISTWIDTH] IN BLOOD BY AUTOMATED COUNT: 15.4 % (ref 10–15)
GFR SERPL CREATININE-BSD FRML MDRD: 86 ML/MIN/{1.73_M2}
GLUCOSE SERPL-MCNC: 89 MG/DL (ref 70–99)
HCT VFR BLD AUTO: 33.1 % (ref 35–47)
HGB BLD-MCNC: 10.8 G/DL (ref 11.7–15.7)
IMM GRANULOCYTES # BLD: 0 10E9/L (ref 0–0.4)
IMM GRANULOCYTES NFR BLD: 0.5 %
LYMPHOCYTES # BLD AUTO: 0.6 10E9/L (ref 0.8–5.3)
LYMPHOCYTES NFR BLD AUTO: 14.6 %
MCH RBC QN AUTO: 31.2 PG (ref 26.5–33)
MCHC RBC AUTO-ENTMCNC: 32.6 G/DL (ref 31.5–36.5)
MCV RBC AUTO: 96 FL (ref 78–100)
MONOCYTES # BLD AUTO: 0.5 10E9/L (ref 0–1.3)
MONOCYTES NFR BLD AUTO: 12.5 %
NEUTROPHILS # BLD AUTO: 2.6 10E9/L (ref 1.6–8.3)
NEUTROPHILS NFR BLD AUTO: 60.6 %
NRBC # BLD AUTO: 0 10*3/UL
NRBC BLD AUTO-RTO: 0 /100
PLATELET # BLD AUTO: 305 10E9/L (ref 150–450)
POTASSIUM SERPL-SCNC: 3.5 MMOL/L (ref 3.4–5.3)
RBC # BLD AUTO: 3.46 10E12/L (ref 3.8–5.2)
SODIUM SERPL-SCNC: 141 MMOL/L (ref 133–144)
WBC # BLD AUTO: 4.3 10E9/L (ref 4–11)

## 2021-06-24 PROCEDURE — 250N000013 HC RX MED GY IP 250 OP 250 PS 637: Performed by: PHYSICIAN ASSISTANT

## 2021-06-24 PROCEDURE — 120N000009 HC R&B SNF

## 2021-06-24 PROCEDURE — 85025 COMPLETE CBC W/AUTO DIFF WBC: CPT | Performed by: PHYSICIAN ASSISTANT

## 2021-06-24 PROCEDURE — 36415 COLL VENOUS BLD VENIPUNCTURE: CPT | Performed by: PHYSICIAN ASSISTANT

## 2021-06-24 PROCEDURE — 97535 SELF CARE MNGMENT TRAINING: CPT | Mod: GO

## 2021-06-24 PROCEDURE — 97110 THERAPEUTIC EXERCISES: CPT | Mod: GO

## 2021-06-24 PROCEDURE — 97110 THERAPEUTIC EXERCISES: CPT | Mod: GP

## 2021-06-24 PROCEDURE — 97116 GAIT TRAINING THERAPY: CPT | Mod: GP

## 2021-06-24 PROCEDURE — 80048 BASIC METABOLIC PNL TOTAL CA: CPT | Performed by: PHYSICIAN ASSISTANT

## 2021-06-24 PROCEDURE — 250N000012 HC RX MED GY IP 250 OP 636 PS 637: Performed by: PHYSICIAN ASSISTANT

## 2021-06-24 PROCEDURE — 250N000011 HC RX IP 250 OP 636: Performed by: PHYSICIAN ASSISTANT

## 2021-06-24 PROCEDURE — 97530 THERAPEUTIC ACTIVITIES: CPT | Mod: GP

## 2021-06-24 RX ADMIN — LEVETIRACETAM 1250 MG: 750 TABLET, FILM COATED ORAL at 08:30

## 2021-06-24 RX ADMIN — BUSPIRONE HYDROCHLORIDE 30 MG: 15 TABLET ORAL at 08:31

## 2021-06-24 RX ADMIN — ONDANSETRON HYDROCHLORIDE 4 MG: 4 TABLET, FILM COATED ORAL at 22:10

## 2021-06-24 RX ADMIN — FUROSEMIDE 40 MG: 40 TABLET ORAL at 08:31

## 2021-06-24 RX ADMIN — PANTOPRAZOLE SODIUM 40 MG: 40 TABLET, DELAYED RELEASE ORAL at 06:02

## 2021-06-24 RX ADMIN — TEMOZOLOMIDE 250 MG: 250 CAPSULE ORAL at 22:13

## 2021-06-24 RX ADMIN — RIVAROXABAN 15 MG: 15 TABLET, FILM COATED ORAL at 18:12

## 2021-06-24 RX ADMIN — RIVAROXABAN 15 MG: 15 TABLET, FILM COATED ORAL at 08:31

## 2021-06-24 RX ADMIN — QUETIAPINE FUMARATE 25 MG: 25 TABLET ORAL at 22:11

## 2021-06-24 RX ADMIN — PANTOPRAZOLE SODIUM 40 MG: 40 TABLET, DELAYED RELEASE ORAL at 17:07

## 2021-06-24 RX ADMIN — FLUOXETINE HYDROCHLORIDE 60 MG: 20 CAPSULE ORAL at 08:30

## 2021-06-24 RX ADMIN — LINACLOTIDE 290 MCG: 145 CAPSULE, GELATIN COATED ORAL at 06:02

## 2021-06-24 RX ADMIN — DOCUSATE SODIUM 100 MG: 100 CAPSULE, LIQUID FILLED ORAL at 22:10

## 2021-06-24 RX ADMIN — SULFAMETHOXAZOLE AND TRIMETHOPRIM 1 TABLET: 400; 80 TABLET ORAL at 08:31

## 2021-06-24 RX ADMIN — BUSPIRONE HYDROCHLORIDE 30 MG: 15 TABLET ORAL at 22:10

## 2021-06-24 RX ADMIN — LEVETIRACETAM 1250 MG: 750 TABLET, FILM COATED ORAL at 22:10

## 2021-06-24 RX ADMIN — AMLODIPINE BESYLATE 5 MG: 5 TABLET ORAL at 08:31

## 2021-06-24 NOTE — PLAN OF CARE
Discharge Planner Post-Acute Rehab OT:      Discharge Plan: Pt reports plan remains to return to her daughter's home with intermittent supervision.  Pt also anticipates continued assistance with finances, medication management, meal prep, general IADL, and bathing.  Pt would need to be independent and safe with toileting and light meal prep.  Pt is progressing but as of tx on 6/17, anticipate she will still need some degree of assist with mobility and ADLs at discharge.      Precautions: falls     Current Status:  ADLs: CGA-Min A sit <> stand with FWW, CGA-Min A ambulation with FWW and w/c follow d/t fatigue, Mod A LB dressing from EOB but min A when reclined in bed, SBA-Min A UB dressing, SBA bed mobility when given extra time.   IADLs: NT  Vision/Cognition: Pt oriented with short term memory deficits present.  Pt reports some difficulty reading as well     Assessment: UE AROM ex, B use to challenge cognition.  Pt needed to slow down on more challenging ex and cues for form.  RUE weaker than L. Good effort. Pt stood at the bathroom sink, 5 minutes x2 with CGA.  Pt also amb into the bathroom w/ 2WW but had w/c behind her for rests.  She is surprised how challenging it is to stand and do activities like she has done in the past.  Pt con't to require assist with LE's for sit to supine in bed.  Progress noted with standing tolerance to do simple oral cares/gr and hyg at the sink.  Con't OT per POC.     Care conference held prior to session with CAMRON, PA, OT, PT, DON, patient, patient's daughter, and patient's son (children were remote). Update give on progress towards OT goals and current functional status. At this time it is recommended for patient to have 24/7 supervision along with hands on assist for functional mobility with FWW, bed mobility, toileting, bathing, and dressing d/t pt's limited insight into safety awareness and fatigue levels during mobility. Extended OT POC to further progress functional goals in  order to determine discharge needs and decrease level of assist. SW working with patient and family to discuss next steps and whether patient will return to daughter's home, her current senior care facility with hired in help, or another facility. Updated POC to last day therapy on 7/9.     Other Barriers to Discharge (DME, Family Training, etc): Safety, family training.

## 2021-06-24 NOTE — LETTER
2021         RE: Olga Bailey  [address] [facility] [address]        Dear Colleague,    Thank you for referring your patient, Olga Bailey, to the [facility] RADIATION ONCOLOGY. Please see a copy of my visit note below.       Department of Radiation Oncology  [facility]  [address] [place] [address]  [phone]       Radiation Oncology Virtual Follow-up Visit  2021      Olga Bailey  MRN: [id number]   : 1945     DISEASE TREATED:   Glioblastoma, IDH1/IDH2 wild type, ATRX intact, p53 and PTEN mutant, BRAF  non-mutated, MGMT promoter methylated.                                   RADIATION THERAPY DELIVERED:   4,005 cGy in 15 fractions, from 2021 - 2021    SYSTEMIC THERAPY:  Plans to start adjuvant temozolomide, no concurrent temozolomide      INTERVAL SINCE COMPLETION OF RADIATION THERAPY:   1 month    SUBJECTIVE:   Olga Bailey is a 75 year old female with known diagnosis of glioblastoma. She first presented with progressive aphasia in 2021, and underwent brain MRI with and without contrast on 2021, which revealed a 3.3 x 2.8 x 2.8 cm enhancing mass in left frontal-parietal region. The patient underwent left parietal craniotomy and computer-assisted stereotactic volumetric resection of the tumor using neuro-navigation with Dr. Cummings of Neurosurgery at [facility] on 2021. Surgical pathology, and subsequent glioma focused NGS panel together confirmed her diagnosis as outlined above.      She experienced prolonged hospitalization post-surgery for complications including DVT and pneumonia. In this interval, her performance status has declined with need for increased respiratory support. She was transferred to [facility] and proceeded with adjuvant radiotherapy alone using hypofractionation scheme as outlined above, while she was continued on close monitoring of her respiratory  "status. Given her performance status, radiation treatment alone was offered.     She was discharged to our transitional care unit on 5/15/2021 and she continued to receive radiation as she undergo rehabilitation. On 5/26/2021, she had a seizure episode, prompting emergent hospitalization to [facility] for workup. CT head yielded no acute findings or change. Neurology was consulted, and she was started on Keppra and Decadron tapering (2 mg BID for 1 wk, 2 mg daily for 1 wk). Otherwise, Ms. Bailey tolerated her treatment well, with development of fatigue as anticipated. On her last day of treatment, she reported vision consistent with \"dirty glasses\" that has occurred throughout the duration of her hospital stay and is stable. No other associated symptoms were reported.      Olga is now reached through video conference for follow up, 1 month after treatment completion. In the interim, she remained in TCU for rehabilitation (till 7/9/2021). She had a post-treatment MRI on 6/22/2021, which revealed interval increase in size of an enhancing parenchymal nodule just superior to the left parietal lobe resection cavity, most concerning for disease recurrence/progression. This was within her radiation field. Later on the same day, she was seen for follow up by Dr. Baker, who recommended initiation of adjuvant Temodar (6/24/2021 - ), with plan for repeat MRI and follow up in 2 months, prior to cycle 3.     She is currently doing well and is increasing her activity tolerance through rehab at the TCU. She reports general  alopecia with more hair loss posteriorly. She denies headaches or changes in vision. Olga has been off of steroids since 6/9. She continues on Keppra.       PHYSICAL EXAM:  Physical exam limited by video visit.  GENERAL: Healthy, alert and no distress. In TCU bed   EYES: Eyes grossly normal to inspection.  No discharge or erythema, or obvious scleral/conjunctival abnormalities.  RESP: No audible wheeze, cough, " or visible cyanosis.  No visible increased work of breathing.   SKIN: Visible skin clear. No significant rash, abnormal pigmentation or lesions  NEURO: Cranial nerves grossly intact.  Mentation and speech appropriate for age. Mentation appears normal, affect normal/bright, judgement and insight intact, normal speech and appearance well-groomed    LABS AND IMAGING:  Last CBC:  Lab Results   Component Value Date    WBC 4.3 06/24/2021     Lab Results   Component Value Date    RBC 3.46 06/24/2021     Lab Results   Component Value Date    HGB 10.8 06/24/2021     Lab Results   Component Value Date    HCT 33.1 06/24/2021     No components found for: MCT  Lab Results   Component Value Date    MCV 96 06/24/2021     Lab Results   Component Value Date    MCH 31.2 06/24/2021     Lab Results   Component Value Date    MCHC 32.6 06/24/2021     Lab Results   Component Value Date    RDW 15.4 06/24/2021     Lab Results   Component Value Date     06/24/2021     Last Comprehensive Metabolic Panel:  Sodium   Date Value Ref Range Status   06/24/2021 141 133 - 144 mmol/L Final     Potassium   Date Value Ref Range Status   06/26/2021 3.7 3.4 - 5.3 mmol/L Final     Chloride   Date Value Ref Range Status   06/24/2021 108 94 - 109 mmol/L Final     Carbon Dioxide   Date Value Ref Range Status   06/24/2021 25 20 - 32 mmol/L Final     Anion Gap   Date Value Ref Range Status   06/24/2021 8 3 - 14 mmol/L Final     Glucose   Date Value Ref Range Status   06/24/2021 89 70 - 99 mg/dL Final     Urea Nitrogen   Date Value Ref Range Status   06/24/2021 14 7 - 30 mg/dL Final     Creatinine   Date Value Ref Range Status   06/24/2021 0.65 0.52 - 1.04 mg/dL Final     GFR Estimate   Date Value Ref Range Status   06/24/2021 86 >60 mL/min/[1.73_m2] Final     Comment:     Non  GFR Calc  Starting 12/18/2018, serum creatinine based estimated GFR (eGFR) will be   calculated using the Chronic Kidney Disease Epidemiology Collaboration    (CKD-EPI) equation.       Calcium   Date Value Ref Range Status   2021 8.7 8.5 - 10.1 mg/dL Final     Bilirubin Total   Date Value Ref Range Status   2021 0.3 0.2 - 1.3 mg/dL Final     Alkaline Phosphatase   Date Value Ref Range Status   2021 103 40 - 150 U/L Final     ALT   Date Value Ref Range Status   2021 36 0 - 50 U/L Final     AST   Date Value Ref Range Status   2021 43 0 - 45 U/L Final     Comment:     Specimen is hemolyzed which can falsely elevate AST. Analysis of a   non-hemolyzed specimen may result in a lower value.         Imagin2021 MRI brain with contrast   IMPRESSION:  1. Interval increase in size of an enhancing parenchymal nodule just  superior to the left parietal lobe resection cavity, most concerning  for disease recurrence/progression.  2. No acute intracranial abnormality.  3. No significant change in the extensive nonspecific T2 FLAIR  hyperintense signal changes in the cerebral white matter and to a  lesser degree within the ashwin, much of which may represent  pre-existing advanced chronic small vessel ischemic disease, with or  without superimposed treatment-related change/leukoencephalopathy.       IMPRESSION:   Ms. 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 She did not received concurrent temozolomide due to her performance status, but is starting adjuvant temozolomide tonight.     PLAN:   1. Radiation Toxicity: Alopecia, expected within treatment field; Fatigue, improving   2. Per MRI brain imaging- equivocal findings of increase in nodule size within treatment field in patient with a methylated gbm at 4 weeks post hypofractionated treatment-  Follow up with radiation oncology as needed; next follow up with Dr. Baker in two months with imaging.      Betsy Spann MD, PhD     Department of Radiation Oncology  [facility]    [facility]       Olga is a 75 year old who is being evaluated via a billable video visit.      How would you like to obtain your AVS? MyChart  If the video visit is dropped, the invitation should be resent by: Send to e-mail at: [email or web address]  Will anyone else be joining your video visit? Yes: La Nena Daughter. How would they like to receive their invitation? Send to e-mail at: [email or web address]    Video Start Time: 1100  Video-Visit Details    Type of service:  Video Visit    Video End Time:113    Originating Location (pt. Location): [facility]    Distant Location (provider location):  [facility] RADIATION ONCOLOGY     Platform used for Video Visit: Other: [place]  FOLLOW-UP VISIT    Patient Name: Olga Bailey      : 1945     Age: 75 year old        ______________________________________________________________________________     Patient presents with:  Cancer: Follow up    There were no vitals taken for this visit.     Date Radiation Completed: 2021    Pain  Denies    Labs  Other Labs: Yes: CBC, CMP     Imaging  MRI: Brain       Other Appointments:     MD Name: Samuel Appointment Date:   MD Name: Appointment Date:  MD Name: Appointment Date:  Other Appointment Notes:     Residual Radiation side effect: Denies     Additional Instructions:     Nurse face-to-face time: Level 3:  10 min face to face time                      Again, thank you for allowing me to participate in the care of your patient.        Sincerely,        Betsy Spann MD

## 2021-06-24 NOTE — PLAN OF CARE
Discharge Planner Post-Acute Rehab PT:      Discharge Plan: home with daughter's assist vs home to senior apartment and 24/7 supervision with hired in help or placement     Precautions: fall, hx of recent seizures, swallowing     Current Status:  Bed Mobility: cga to min A, Mod assist to scoot hips  Transfer: cga to min A stand pivot with FWW  Gait: 200 feet with FWW Gerard and w/c follow for safety  Stairs: 6 stairs, 2 rails, Gerard  Balance: cga to occasional min A when fatigued.     Assessment:  Pt given eduation on importance of resting when needed, vs waiting for cues from therapist to sit and rest. Pt at risk for falls since she tends to push herself vs listening to her body. Noted this with amb primarily.    Other Barriers to Discharge (DME, Family Training, etc): functional tolerance, medical complexity, functional status, neuro involvement

## 2021-06-24 NOTE — PROGRESS NOTES
Olga is a 75 year old who is being evaluated via a billable video visit.      How would you like to obtain your AVS? MyChart  If the video visit is dropped, the invitation should be resent by: Send to e-mail at: [email or web address]  Will anyone else be joining your video visit? Yes: La Nena Daughter. How would they like to receive their invitation? Send to e-mail at: [email or web address]    Video Start Time:   Video-Visit Details    Type of service:  Video Visit    Video End Time:113    Originating Location (pt. Location): [facility]    Distant Location (provider location):  [facility] RADIATION ONCOLOGY     Platform used for Video Visit: Other: [place]  FOLLOW-UP VISIT    Patient Name: Olga Bailey      : 1945     Age: 75 year old        ______________________________________________________________________________     Patient presents with:  Cancer: Follow up    There were no vitals taken for this visit.     Date Radiation Completed: 2021    Pain  Denies    Labs  Other Labs: Yes: CBC, CMP     Imaging  MRI: Brain       Other Appointments:     MD Name: Samuel Appointment Date:   MD Name: Appointment Date:  MD Name: Appointment Date:  Other Appointment Notes:     Residual Radiation side effect: Denies     Additional Instructions:     Nurse face-to-face time: Level 3:  10 min face to face time

## 2021-06-24 NOTE — PLAN OF CARE
Alert and oriented, VSS. Denies pain, no SOB. Appetite good, adequate fluid intake. R shin dressing CDI. Declined to apply compression stockings applied to BLE at this time but has legs elevated in bed. Incontinent of B/B; purewick applied. Stool softeners held d/t loose stool. Had a video visit via iPad w/ Rad Onc. Family visiting this afternoon.     Patient's most recent vital signs are:     Vital signs:  BP: 124/68  Temp: 97.5  HR: 88  RR: 18  SpO2: 92 %     Patient does not have new respiratory symptoms.  Patient does not have new sore throat.  Patient does not have a fever greater than 99.5.

## 2021-06-24 NOTE — PLAN OF CARE
Pt is alert and oriented x 4. Can communicate needs effectively. VSS-denies SOB, chest pain, headache and nausea. Assist of 1 with walker and GB. Voids via pure wick. LBM 06/23/21, incontinent and loose. On a DD3 diet with good appetite. Thin liquids/no straw. Fluids promoted. Generalized bruising to BUE and BLE. Skin tear dressing on R shin changed. No acute issue or concern reported. Call light within reach.     Patient's most recent vital signs are:     Vital signs:  BP: 113/66  Temp: 98.6  HR: 100  RR: 18  SpO2: 94 %     Patient does not have new respiratory symptoms.  Patient does not have new sore throat.  Patient does not have a fever greater than 99.5.

## 2021-06-24 NOTE — PLAN OF CARE
Pt. is alert and oriented x 4. Can make needs known. Assist of 1 with transfers. Voids via pure wick. LBM 06/23/21. Denies chest pain, SOB and nausea. C/o mild headache, PRN tylenol administered with relief. Pt. slept most of the night during rounds. Seizure precaution in place. Call light within reach. Continue with POC.     Patient's most recent vital signs are:     Vital signs:  BP: 113/66  Temp: 98.6  HR: 100  RR: 18  SpO2: 94 %     Patient does not have new respiratory symptoms.  Patient does not have new sore throat.  Patient does not have a fever greater than 99.5.

## 2021-06-24 NOTE — PROGRESS NOTES
"SW followed up with pt to discuss discharge plan. Pt stated that she had discussed the topics of our recent care conference (6/22) with her children, but not in-depth. Pt states that she knows her options are to have 24/7 caregivers or to go to a facility.     Pt states that having caregivers in her home would \"take quite some getting used to.\" Pt does not like the idea of going to a nursing home,  but she realizes it may be necessary. She she was much more tolerant and forthcoming about discussing it with me today. SW provided an overview of the referral process and offered to compile an initial list of facilities to share with pt and her family for them to discuss. Pt stated that this would be a good plan.     Pt expressed feeling like all the decisions facing her \"are a big pile looming over\" her. Pt wishes her choices were more cut and dried. She still feels like she is capable of achieving more functionality and mobility. SW provided reflective listening and emotional support.     Pt shared concerns about her new chemo regimen and whether staff were aware that she needed Zofran prior to chemo capsule. SW informed pt that this update was detailed in the chart but that I would notify the charge nurse if it would put her at ease. Charge nurse notified.    SW will continue to remain available for patient and family support, discharge planning, other resources and support PRN.    Frederic SORTO, [facility]   Phone: [phone]      "

## 2021-06-24 NOTE — PROGRESS NOTES
SPIRITUAL HEALTH SERVICES  SPIRITUAL ASSESSMENT Progress Note  [facility] ([facility]) TCU R423 6/24    REFERRAL SOURCE: Follow Up     Pt was resting  Before next appointment. She mentioned she was feeling  A bit better this week. Family  Have been supporting pt with nice gifts to cheer her up. Pt was offered prayer by Unit . Pt consented to prayer.    PLAN: Will follow up with pt as needed while on unit.    Giovanna Drew  Associate    Pager: [phone]

## 2021-06-24 NOTE — PROGRESS NOTES
Department of Radiation Oncology  [facility]  [address] [place] [address]  [phone]       Radiation Oncology Virtual Follow-up Visit  2021      Olga Bailey  MRN: [id number]   : 1945     DISEASE TREATED:   Glioblastoma, IDH1/IDH2 wild type, ATRX intact, p53 and PTEN mutant, BRAF  non-mutated, MGMT promoter methylated.                                   RADIATION THERAPY DELIVERED:   4,005 cGy in 15 fractions, from 2021 - 2021    SYSTEMIC THERAPY:  Plans to start adjuvant temozolomide, no concurrent temozolomide      INTERVAL SINCE COMPLETION OF RADIATION THERAPY:   1 month    SUBJECTIVE:   Olga Bailey is a 75 year old female with known diagnosis of glioblastoma. She first presented with progressive aphasia in 2021, and underwent brain MRI with and without contrast on 2021, which revealed a 3.3 x 2.8 x 2.8 cm enhancing mass in left frontal-parietal region. The patient underwent left parietal craniotomy and computer-assisted stereotactic volumetric resection of the tumor using neuro-navigation with Dr. Cummings of Neurosurgery at [facility] on 2021. Surgical pathology, and subsequent glioma focused NGS panel together confirmed her diagnosis as outlined above.      She experienced prolonged hospitalization post-surgery for complications including DVT and pneumonia. In this interval, her performance status has declined with need for increased respiratory support. She was transferred to [facility] and proceeded with adjuvant radiotherapy alone using hypofractionation scheme as outlined above, while she was continued on close monitoring of her respiratory status. Given her performance status, radiation treatment alone was offered.     She was discharged to our transitional care unit on 5/15/2021 and she continued to receive radiation as she undergo rehabilitation. On 2021, she had a seizure episode, prompting  "emergent hospitalization to [facility] for workup. CT head yielded no acute findings or change. Neurology was consulted, and she was started on Keppra and Decadron tapering (2 mg BID for 1 wk, 2 mg daily for 1 wk). Otherwise, Ms. Bailey tolerated her treatment well, with development of fatigue as anticipated. On her last day of treatment, she reported vision consistent with \"dirty glasses\" that has occurred throughout the duration of her hospital stay and is stable. No other associated symptoms were reported.      Olga is now reached through video conference for follow up, 1 month after treatment completion. In the interim, she remained in TCU for rehabilitation (till 7/9/2021). She had a post-treatment MRI on 6/22/2021, which revealed interval increase in size of an enhancing parenchymal nodule just superior to the left parietal lobe resection cavity, most concerning for disease recurrence/progression. This was within her radiation field. Later on the same day, she was seen for follow up by Dr. Baker, who recommended initiation of adjuvant Temodar (6/24/2021 - ), with plan for repeat MRI and follow up in 2 months, prior to cycle 3.     She is currently doing well and is increasing her activity tolerance through rehab at the TCU. She reports general  alopecia with more hair loss posteriorly. She denies headaches or changes in vision. Olga has been off of steroids since 6/9. She continues on Keppra.       PHYSICAL EXAM:  Physical exam limited by video visit.  GENERAL: Healthy, alert and no distress. In TCU bed   EYES: Eyes grossly normal to inspection.  No discharge or erythema, or obvious scleral/conjunctival abnormalities.  RESP: No audible wheeze, cough, or visible cyanosis.  No visible increased work of breathing.   SKIN: Visible skin clear. No significant rash, abnormal pigmentation or lesions  NEURO: Cranial nerves grossly intact.  Mentation and speech appropriate for age. Mentation appears normal, affect " normal/bright, judgement and insight intact, normal speech and appearance well-groomed    LABS AND IMAGING:  Last CBC:  Lab Results   Component Value Date    WBC 4.3 06/24/2021     Lab Results   Component Value Date    RBC 3.46 06/24/2021     Lab Results   Component Value Date    HGB 10.8 06/24/2021     Lab Results   Component Value Date    HCT 33.1 06/24/2021     No components found for: MCT  Lab Results   Component Value Date    MCV 96 06/24/2021     Lab Results   Component Value Date    MCH 31.2 06/24/2021     Lab Results   Component Value Date    MCHC 32.6 06/24/2021     Lab Results   Component Value Date    RDW 15.4 06/24/2021     Lab Results   Component Value Date     06/24/2021     Last Comprehensive Metabolic Panel:  Sodium   Date Value Ref Range Status   06/24/2021 141 133 - 144 mmol/L Final     Potassium   Date Value Ref Range Status   06/26/2021 3.7 3.4 - 5.3 mmol/L Final     Chloride   Date Value Ref Range Status   06/24/2021 108 94 - 109 mmol/L Final     Carbon Dioxide   Date Value Ref Range Status   06/24/2021 25 20 - 32 mmol/L Final     Anion Gap   Date Value Ref Range Status   06/24/2021 8 3 - 14 mmol/L Final     Glucose   Date Value Ref Range Status   06/24/2021 89 70 - 99 mg/dL Final     Urea Nitrogen   Date Value Ref Range Status   06/24/2021 14 7 - 30 mg/dL Final     Creatinine   Date Value Ref Range Status   06/24/2021 0.65 0.52 - 1.04 mg/dL Final     GFR Estimate   Date Value Ref Range Status   06/24/2021 86 >60 mL/min/[1.73_m2] Final     Comment:     Non  GFR Calc  Starting 12/18/2018, serum creatinine based estimated GFR (eGFR) will be   calculated using the Chronic Kidney Disease Epidemiology Collaboration   (CKD-EPI) equation.       Calcium   Date Value Ref Range Status   06/24/2021 8.7 8.5 - 10.1 mg/dL Final     Bilirubin Total   Date Value Ref Range Status   06/22/2021 0.3 0.2 - 1.3 mg/dL Final     Alkaline Phosphatase   Date Value Ref Range Status   06/22/2021 103  40 - 150 U/L Final     ALT   Date Value Ref Range Status   2021 36 0 - 50 U/L Final     AST   Date Value Ref Range Status   2021 43 0 - 45 U/L Final     Comment:     Specimen is hemolyzed which can falsely elevate AST. Analysis of a   non-hemolyzed specimen may result in a lower value.         Imagin2021 MRI brain with contrast   IMPRESSION:  1. Interval increase in size of an enhancing parenchymal nodule just  superior to the left parietal lobe resection cavity, most concerning  for disease recurrence/progression.  2. No acute intracranial abnormality.  3. No significant change in the extensive nonspecific T2 FLAIR  hyperintense signal changes in the cerebral white matter and to a  lesser degree within the ashwin, much of which may represent  pre-existing advanced chronic small vessel ischemic disease, with or  without superimposed treatment-related change/leukoencephalopathy.       IMPRESSION:   Ms. 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 She did not received concurrent temozolomide due to her performance status, but is starting adjuvant temozolomide tonight.     PLAN:   1. Radiation Toxicity: Alopecia, expected within treatment field; Fatigue, improving   2. Per MRI brain imaging- equivocal findings of increase in nodule size within treatment field in patient with a methylated gbm at 4 weeks post hypofractionated treatment-  Follow up with radiation oncology as needed; next follow up with Dr. Baker in two months with imaging.      Betsy Spann MD, PhD     Department of Radiation Oncology  [facility]

## 2021-06-25 ENCOUNTER — APPOINTMENT (OUTPATIENT)
Dept: PHYSICAL THERAPY | Facility: SKILLED NURSING FACILITY | Age: 76
End: 2021-06-25
Payer: MEDICARE

## 2021-06-25 ENCOUNTER — APPOINTMENT (OUTPATIENT)
Dept: OCCUPATIONAL THERAPY | Facility: SKILLED NURSING FACILITY | Age: 76
End: 2021-06-25
Payer: MEDICARE

## 2021-06-25 LAB
POTASSIUM SERPL-SCNC: 3.2 MMOL/L (ref 3.4–5.3)
POTASSIUM SERPL-SCNC: 4.1 MMOL/L (ref 3.4–5.3)

## 2021-06-25 PROCEDURE — 84132 ASSAY OF SERUM POTASSIUM: CPT | Performed by: INTERNAL MEDICINE

## 2021-06-25 PROCEDURE — 97110 THERAPEUTIC EXERCISES: CPT | Mod: GO

## 2021-06-25 PROCEDURE — 250N000013 HC RX MED GY IP 250 OP 250 PS 637: Performed by: PHYSICIAN ASSISTANT

## 2021-06-25 PROCEDURE — 120N000009 HC R&B SNF

## 2021-06-25 PROCEDURE — 36415 COLL VENOUS BLD VENIPUNCTURE: CPT | Performed by: INTERNAL MEDICINE

## 2021-06-25 PROCEDURE — 250N000013 HC RX MED GY IP 250 OP 250 PS 637: Performed by: INTERNAL MEDICINE

## 2021-06-25 PROCEDURE — 250N000011 HC RX IP 250 OP 636: Performed by: INTERNAL MEDICINE

## 2021-06-25 PROCEDURE — 97530 THERAPEUTIC ACTIVITIES: CPT | Mod: GP | Performed by: STUDENT IN AN ORGANIZED HEALTH CARE EDUCATION/TRAINING PROGRAM

## 2021-06-25 PROCEDURE — 97535 SELF CARE MNGMENT TRAINING: CPT | Mod: GO

## 2021-06-25 PROCEDURE — 250N000012 HC RX MED GY IP 250 OP 636 PS 637: Performed by: INTERNAL MEDICINE

## 2021-06-25 RX ORDER — TEMOZOLOMIDE 250 MG/1
250 CAPSULE ORAL AT BEDTIME
Status: COMPLETED | OUTPATIENT
Start: 2021-06-25 | End: 2021-06-28

## 2021-06-25 RX ORDER — ONDANSETRON 4 MG/1
4 TABLET, FILM COATED ORAL DAILY
Status: COMPLETED | OUTPATIENT
Start: 2021-06-25 | End: 2021-06-28

## 2021-06-25 RX ORDER — POTASSIUM CHLORIDE 750 MG/1
40 TABLET, EXTENDED RELEASE ORAL ONCE
Status: COMPLETED | OUTPATIENT
Start: 2021-06-25 | End: 2021-06-25

## 2021-06-25 RX ADMIN — PANTOPRAZOLE SODIUM 40 MG: 40 TABLET, DELAYED RELEASE ORAL at 06:09

## 2021-06-25 RX ADMIN — FLUOXETINE HYDROCHLORIDE 60 MG: 20 CAPSULE ORAL at 07:59

## 2021-06-25 RX ADMIN — SULFAMETHOXAZOLE AND TRIMETHOPRIM 1 TABLET: 400; 80 TABLET ORAL at 08:02

## 2021-06-25 RX ADMIN — DOCUSATE SODIUM 100 MG: 100 CAPSULE, LIQUID FILLED ORAL at 08:01

## 2021-06-25 RX ADMIN — LINACLOTIDE 290 MCG: 145 CAPSULE, GELATIN COATED ORAL at 06:09

## 2021-06-25 RX ADMIN — BUSPIRONE HYDROCHLORIDE 30 MG: 15 TABLET ORAL at 08:01

## 2021-06-25 RX ADMIN — FUROSEMIDE 40 MG: 40 TABLET ORAL at 08:02

## 2021-06-25 RX ADMIN — RIVAROXABAN 15 MG: 15 TABLET, FILM COATED ORAL at 08:02

## 2021-06-25 RX ADMIN — ONDANSETRON HYDROCHLORIDE 4 MG: 4 TABLET, FILM COATED ORAL at 20:08

## 2021-06-25 RX ADMIN — BUSPIRONE HYDROCHLORIDE 30 MG: 15 TABLET ORAL at 20:07

## 2021-06-25 RX ADMIN — LEVETIRACETAM 1250 MG: 750 TABLET, FILM COATED ORAL at 08:01

## 2021-06-25 RX ADMIN — RIVAROXABAN 15 MG: 15 TABLET, FILM COATED ORAL at 18:06

## 2021-06-25 RX ADMIN — TEMOZOLOMIDE 250 MG: 250 CAPSULE ORAL at 21:02

## 2021-06-25 RX ADMIN — AMLODIPINE BESYLATE 5 MG: 5 TABLET ORAL at 08:01

## 2021-06-25 RX ADMIN — PANTOPRAZOLE SODIUM 40 MG: 40 TABLET, DELAYED RELEASE ORAL at 16:23

## 2021-06-25 RX ADMIN — POTASSIUM CHLORIDE 40 MEQ: 750 TABLET, EXTENDED RELEASE ORAL at 07:58

## 2021-06-25 RX ADMIN — LEVETIRACETAM 1250 MG: 750 TABLET, FILM COATED ORAL at 20:08

## 2021-06-25 RX ADMIN — QUETIAPINE FUMARATE 25 MG: 25 TABLET ORAL at 21:00

## 2021-06-25 NOTE — PROGRESS NOTES
SW provided printed list of five skilled nursing facilities near patient's current home for her to review and sent the same list to her adult children via email. Pt said her family will discuss over the weekend. SW will follow up Monday.    Pt had no questions or concerns at this time. SW will continue to remain available for patient and family support, discharge planning, other resources and support PRN.    Frederic SORTO, [facility]  TCU   Phone: [phone]

## 2021-06-25 NOTE — PLAN OF CARE
Discharge Planner Post-Acute Rehab OT:      Discharge Plan: Pt reports plan remains to return to her daughter's home with intermittent supervision.  Pt also anticipates continued assistance with finances, medication management, meal prep, general IADL, and bathing.  Pt would need to be independent and safe with toileting and light meal prep.  Pt is progressing but as of tx on 6/17, anticipate she will still need some degree of assist with mobility and ADLs at discharge.      Precautions: falls     Current Status:  ADLs: CGA-Min A sit <> stand with FWW, CGA-Min A ambulation with FWW and w/c follow d/t fatigue, Mod A LB dressing from EOB but min A when reclined in bed, SBA-Min A UB dressing, SBA bed mobility when given extra time.   IADLs: NT  Vision/Cognition: Pt oriented with short term memory deficits present.  Pt reports some difficulty reading as well     Assessment:  Mod A STS from recliner, min A and cues for safe positioning with amb using 2WW to the w/c.  Pt is fatigued from poor nights sleep, reporting a disagreement with nsg last night re: her chemo med.  This was stressful for pt and she reports he dtr will be present today to help sort out the correct administration of her chemo medication. Increase assist with transfers and mobility this morning. Pt is asking for more therapy.  She is very motivated but not always realistic about her capability.   Th initiated a yellow tband HEP for pt to use in her room, she required max cues but participated well.  Th had to modify ex for RUE weakness.  Pt also worked with RAISA monique and worked on core ex as well to improve her ADLs and mobility.    Con't OT per POC.     Care conference held prior to session with [facility], PA, OT, PT, DON, patient, patient's daughter, and patient's son (children were remote). Update give on progress towards OT goals and current functional status. At this time it is recommended for patient to have 24/7 supervision along with hands on assist for  functional mobility with FWW, bed mobility, toileting, bathing, and dressing d/t pt's limited insight into safety awareness and fatigue levels during mobility. Extended OT POC to further progress functional goals in order to determine discharge needs and decrease level of assist. SW working with patient and family to discuss next steps and whether patient will return to daughter's home, her current senior care facility with hired in help, or another facility. Updated POC to last day therapy on 7/9.     Other Barriers to Discharge (DME, Family Training, etc): Safety, family training.

## 2021-06-25 NOTE — PLAN OF CARE
Discharge Planner Post-Acute Rehab PT:     Discharge Plan: home with daughter's assist vs home to senior apartment and 24/7 supervision with hired in help or placement     Precautions: fall, hx of recent seizures, swallowing     Current Status:  Bed Mobility: variable cga to MAX A, Mod assist to scoot hips  Transfer: cga to min A stand pivot with FWW, difficulty with anterior WS coming to stand, also WB through forefoot and has posterior lean  Gait: 200 feet with FWW Gerard and w/c follow for safety  Stairs: 6 stairs, 2 rails, Gerard  Balance: cga to occasional min A when fatigued.     Assessment:  session limited today d/t eating and nutrition services assisting with meal ordering. Sup>sit with MOD-MAX A today, difficulties with sequencing, rigidity, bringing weight forward. Still with significant posterior lean in standing.  i  Pt given eduation on importance of resting when needed, vs waiting for cues from therapist to sit and rest. Pt at risk for falls since she tends to push herself vs listening to her body. Noted this with amb primarily.     Other Barriers to Discharge (DME, Family Training, etc): functional tolerance, medical complexity, functional status, neuro involvement

## 2021-06-25 NOTE — PLAN OF CARE
Pt slept most of the shift. Able to make needs known. Pure wick in place. Denies SOB, CP, headache, nausea or dizziness. Seizure precaution in place. Call light is in reach. Continue with plan of care.      Patient's most recent vital signs are:     Vital signs:  BP: 122/72  Temp: 96.5  HR: 86  RR: 18  SpO2: 94 %     Patient does not have new respiratory symptoms.  Patient does not have new sore throat.  Patient does not have a fever greater than 99.5.

## 2021-06-25 NOTE — PLAN OF CARE
Pt is alert and oriented x 4. Uses call light appropriately and able to communicate needs. Denies pain, SOB, CP, nausea, headache or dizziness. Seizure precaution in place. purewick changed. Pt declined having compression socks on. Had a good appetite. Transfers with 1 assist to the bedside commode using walker and gait belt. Call light is in reach. Continue with plan of care.       Patient's most recent vital signs are:     Vital signs:  BP: 122/72  Temp: 96.5  HR: 86  RR: 18  SpO2: 94 %     Patient does not have new respiratory symptoms.  Patient does not have new sore throat.  Patient does not have a fever greater than 99.5.

## 2021-06-25 NOTE — PLAN OF CARE
Patient alert and oriented x 4.Denies pain/SOB/chest pain.No BM this shift.Purewick in place.Potassium replaced per protocol.VSS.        Patient's most recent vital signs are:     Vital signs:  BP: 121/76  Temp: 96.3  HR: 92  RR: 18  SpO2: 92 %     Patient does not have new respiratory symptoms.  Patient does not have new sore throat.  Patient does not have a fever greater than 99.5.

## 2021-06-25 NOTE — PROGRESS NOTES
CLINICAL NUTRITION SERVICES - REASSESSMENT NOTE     Nutrition Prescription    RECOMMENDATIONS FOR MDs/PROVIDERS TO ORDER:  Diet per SLP    Malnutrition Status:    Patient does not meet two of the established criteria necessary for diagnosing malnutrition    Recommendations already ordered by Registered Dietitian (RD):  Discontinue magic cup  Ensure (strawberry or chocolate) q day  Updated preferences for defaults    Future/Additional Recommendations:  Monitor intakes and wt trends  Adjust supplements per pt preferences/increase pending intakes/wt     EVALUATION OF THE PROGRESS TOWARD GOALS   Diet: Dysphagia Level 3:  Advanced, thin  Supplements: magic cup PM snack  Intake: % of meals per flowsheet. Ordered, on average x3 days, 1435kcal and 80g protein/day (not including supplements) which meets estimated needs.     NEW FINDINGS   Spoke to pt and daughter at bedside. Pt reports a good appetite, stated she is finding enough on the menu that she enjoys. She was very happy about the BBQ tofu she received for dinner last night. Discussed having this sent as a default if NSAs are unable to get in touch with pt to help complete menus. Also stated she is no longer able to order batres omeletes since the menu change. Batres is not allowed with current consistency diet, reviewed previous menu selects however pt has not ordered a batres omelet (received batres with SLP and ham omelet in the past). Discussed other meat omelets that are compliant with diet consistency, pt happy with ham omelet (also added to default). Pt is receiving magic cup q day however she is no longer eating it. Discussed other supplement options and she is agreeable to ensure (strawberry or chocolate) q day.   - Wt fluctuates between ~150-160 lbs over the last 4 months. Most recent 9 lb wt loss over 14 days, question accuracy of admit wt; overall wt similar from 1 month ago. Wt fluctuations possibly d/t fluid status/diuresis. Admitted with 1-2+ BLE  edema (hips to feet), reduced to 1+ (ankles and feet) per flowsheet documentation. 3# wt gain since last RD visit  06/19/21 : 68.9 kg (151 lb 12.8 oz)  06/09/21 : 67.2 kg (148 lb 3.2 oz)  06/05/21 : 72.6 kg (160 lb)  06/05/21 : 73.7 kg (162 lb 7.7 oz)  06/03/21 : 68.1 kg (150 lb 1.6 oz)  06/01/21 : 68.6 kg (151 lb 4.8 oz)   05/29/21 : 70 kg (154 lb 6.4 oz)   05/19/21 : 69.9 kg (154 lb)  05/15/21 : 70.3 kg (155 lb)   05/10/21 : 67.1 kg (147 lb 14.9 oz)  04/26/21 : 74.4 kg (164 lb 0.4 oz)  03/23/21 : 70 kg (154 lb 6.4 oz)  03/08/21 : 69.9 kg (154 lb)  02/26/21 : 74.4 kg (164 lb 1.6 oz)  11/20/15 : 68 kg (150 lb)    MALNUTRITION  % Intake: No decreased intake noted  % Weight Loss: Weight loss does not meet criteria  Subcutaneous Fat Loss: None observed  Muscle Loss: Temporal, Upper arm (bicep, tricep) and Dorsal hand: mild  Fluid Accumulation/Edema: generalized  Malnutrition Diagnosis: Patient does not meet two of the established criteria necessary for diagnosing malnutrition    Previous Goals   Patient to consume % of nutritionally adequate meal trays TID, or the equivalent with supplements/snacks.  Evaluation: Met    Previous Nutrition Diagnosis  Predicted inadequate protein-energy intake related to variable appetite as evidenced by pt reliant on PO intakes to meet 100% of nutritional needs with potential for variation     Evaluation: No change    CURRENT NUTRITION DIAGNOSIS  Predicted inadequate protein-energy intake related to variable appetite as evidenced by pt reliant on PO intakes to meet 100% of nutritional needs with potential for variation       INTERVENTIONS  Implementation  Discontinue magic cup  Ensure (strawberry or chocoalte) q day  Updated preferences for defaults    Goals  Patient to consume % of nutritionally adequate meal trays TID, or the equivalent with supplements/snacks.    Monitoring/Evaluation  Progress toward goals will be monitored and evaluated per protocol.    Rena Marsh  MS, RD, LDN  Unit Pager [phone]

## 2021-06-26 ENCOUNTER — APPOINTMENT (OUTPATIENT)
Dept: OCCUPATIONAL THERAPY | Facility: SKILLED NURSING FACILITY | Age: 76
End: 2021-06-26
Payer: MEDICARE

## 2021-06-26 LAB — POTASSIUM SERPL-SCNC: 3.7 MMOL/L (ref 3.4–5.3)

## 2021-06-26 PROCEDURE — 250N000013 HC RX MED GY IP 250 OP 250 PS 637: Performed by: PHYSICIAN ASSISTANT

## 2021-06-26 PROCEDURE — 97535 SELF CARE MNGMENT TRAINING: CPT | Mod: GO

## 2021-06-26 PROCEDURE — 120N000009 HC R&B SNF

## 2021-06-26 PROCEDURE — 250N000012 HC RX MED GY IP 250 OP 636 PS 637: Performed by: INTERNAL MEDICINE

## 2021-06-26 PROCEDURE — 97110 THERAPEUTIC EXERCISES: CPT | Mod: GO

## 2021-06-26 PROCEDURE — 84132 ASSAY OF SERUM POTASSIUM: CPT | Performed by: INTERNAL MEDICINE

## 2021-06-26 PROCEDURE — 36415 COLL VENOUS BLD VENIPUNCTURE: CPT | Performed by: INTERNAL MEDICINE

## 2021-06-26 PROCEDURE — 250N000011 HC RX IP 250 OP 636: Performed by: INTERNAL MEDICINE

## 2021-06-26 RX ADMIN — DOCUSATE SODIUM 100 MG: 100 CAPSULE, LIQUID FILLED ORAL at 09:11

## 2021-06-26 RX ADMIN — LEVETIRACETAM 1250 MG: 750 TABLET, FILM COATED ORAL at 20:03

## 2021-06-26 RX ADMIN — PANTOPRAZOLE SODIUM 40 MG: 40 TABLET, DELAYED RELEASE ORAL at 15:56

## 2021-06-26 RX ADMIN — RIVAROXABAN 15 MG: 15 TABLET, FILM COATED ORAL at 18:54

## 2021-06-26 RX ADMIN — TEMOZOLOMIDE 250 MG: 250 CAPSULE ORAL at 21:21

## 2021-06-26 RX ADMIN — ACETAMINOPHEN 650 MG: 325 TABLET, FILM COATED ORAL at 21:41

## 2021-06-26 RX ADMIN — LEVETIRACETAM 1250 MG: 750 TABLET, FILM COATED ORAL at 09:11

## 2021-06-26 RX ADMIN — QUETIAPINE FUMARATE 25 MG: 25 TABLET ORAL at 21:22

## 2021-06-26 RX ADMIN — BUSPIRONE HYDROCHLORIDE 30 MG: 15 TABLET ORAL at 20:03

## 2021-06-26 RX ADMIN — FLUOXETINE HYDROCHLORIDE 60 MG: 20 CAPSULE ORAL at 09:11

## 2021-06-26 RX ADMIN — ONDANSETRON HYDROCHLORIDE 4 MG: 4 TABLET, FILM COATED ORAL at 20:04

## 2021-06-26 RX ADMIN — BUSPIRONE HYDROCHLORIDE 30 MG: 15 TABLET ORAL at 09:12

## 2021-06-26 RX ADMIN — PANTOPRAZOLE SODIUM 40 MG: 40 TABLET, DELAYED RELEASE ORAL at 07:45

## 2021-06-26 RX ADMIN — CALCIUM POLYCARBOPHIL 625 MG: 625 TABLET, FILM COATED ORAL at 09:12

## 2021-06-26 RX ADMIN — LINACLOTIDE 290 MCG: 145 CAPSULE, GELATIN COATED ORAL at 07:46

## 2021-06-26 RX ADMIN — RIVAROXABAN 15 MG: 15 TABLET, FILM COATED ORAL at 09:12

## 2021-06-26 RX ADMIN — SULFAMETHOXAZOLE AND TRIMETHOPRIM 1 TABLET: 400; 80 TABLET ORAL at 09:12

## 2021-06-26 NOTE — PLAN OF CARE
Patient alert and oriented x 4.Up with OT to bathroom for wash up and oral cares.Patient using walker with supervision.Up in the chair during meals.LBM 6/25,declined afternoon Colace.NO blood pressure meds given in am shift,parameters not met.Potassium was 3.7,no replacement today.VSS.        Patient's most recent vital signs are:     Vital signs:  BP: 109/66  Temp: 97.6  HR: 80  RR: 18  SpO2: 90 %     Patient does not have new respiratory symptoms.  Patient does not have new sore throat.  Patient does not have a fever greater than 99.5.

## 2021-06-26 NOTE — PLAN OF CARE
Alert and oriented. Able to make needs known.  Call light in reach. Offers no C/O pain/discomfort. Can be continent and incontinent of bowel and bladder.  Pure wick in place at night. Last BM on 6/25. Up with assist of 1 , gait belt and walker. Appears to be sleeping during rounds. Bed alarm on for safety. Continue with plan of care.

## 2021-06-26 NOTE — PLAN OF CARE
"Discharge Planner Post-Acute Rehab OT:      Discharge Plan: Pt reports plan remains to return to her daughter's home with intermittent supervision.  Pt also anticipates continued assistance with finances, medication management, meal prep, general IADL, and bathing.  Pt would need to be independent and safe with toileting and light meal prep.  Pt is progressing but as of tx on 6/17, anticipate she will still need some degree of assist with mobility and ADLs at discharge.      Precautions: falls     Current Status:  ADLs: CGA-Min A sit <> stand with FWW, CGA-Min A ambulation with FWW and w/c follow d/t fatigue, Mod A LB dressing from EOB but min A when reclined in bed, SBA-Min A UB dressing, SBA bed mobility when given extra time.   IADLs: NT  Vision/Cognition: Pt oriented with short term memory deficits present.  Pt reports some difficulty reading as well     Assessment:   \"I would like to be more IND with my walking.\"  Th facilitated ADLs standing at the sink.  Pt con't to need min A supine to sit despite cues and extra time.  CGA STS and amb with 2WW from bed to sink.  Pt stood with SBA while doing UB/oral cares, less LOB with this today.  Pt then amb back to recliner.  She seemed more alert today, more energy and following cues well.  Pt better able to follow tband exercise today but con't to benefit from cues/demo and modification for sh weakness.    Con't OT per POC.     Care conference held prior to session with SW, PA, OT, PT, DON, patient, patient's daughter, and patient's son (children were remote). Update give on progress towards OT goals and current functional status. At this time it is recommended for patient to have 24/7 supervision along with hands on assist for functional mobility with FWW, bed mobility, toileting, bathing, and dressing d/t pt's limited insight into safety awareness and fatigue levels during mobility. Extended OT POC to further progress functional goals in order to determine discharge " needs and decrease level of assist. SW working with patient and family to discuss next steps and whether patient will return to daughter's home, her current senior care facility with hired in help, or another facility. Updated POC to last day therapy on 7/9.     Other Barriers to Discharge (DME, Family Training, etc): Safety, family training.

## 2021-06-26 NOTE — PLAN OF CARE
Pt is alert and oriented. Up with assist of 1 and walker to BSC. Denies shortness of breath/chest pain. Continent/incontinent of B/b tonight. Purewick on at bedtime. LBM today. Pt scratched scab on leg by skin tear dressing, cleansed and applied band aid. Takes meds whole without difficulty. Denies pain. Continue POC.       Patient's most recent vital signs are:     Vital signs:  BP: 109/62  Temp: 97.1  HR: 89  RR: 18  SpO2: 94 %     Patient does not have new respiratory symptoms.  Patient does not have new sore throat.  Patient does not have a fever greater than 99.5.

## 2021-06-27 ENCOUNTER — APPOINTMENT (OUTPATIENT)
Dept: CT IMAGING | Facility: CLINIC | Age: 76
End: 2021-06-27
Attending: INTERNAL MEDICINE
Payer: MEDICARE

## 2021-06-27 LAB — HGB BLD-MCNC: 11.4 G/DL (ref 11.7–15.7)

## 2021-06-27 PROCEDURE — 97112 NEUROMUSCULAR REEDUCATION: CPT | Mod: GP | Performed by: PHYSICAL THERAPIST

## 2021-06-27 PROCEDURE — 250N000012 HC RX MED GY IP 250 OP 636 PS 637: Performed by: INTERNAL MEDICINE

## 2021-06-27 PROCEDURE — 72125 CT NECK SPINE W/O DYE: CPT

## 2021-06-27 PROCEDURE — 250N000011 HC RX IP 250 OP 636: Performed by: INTERNAL MEDICINE

## 2021-06-27 PROCEDURE — 99309 SBSQ NF CARE MODERATE MDM 30: CPT | Performed by: PHYSICIAN ASSISTANT

## 2021-06-27 PROCEDURE — 72125 CT NECK SPINE W/O DYE: CPT | Mod: 26 | Performed by: RADIOLOGY

## 2021-06-27 PROCEDURE — 36415 COLL VENOUS BLD VENIPUNCTURE: CPT | Performed by: INTERNAL MEDICINE

## 2021-06-27 PROCEDURE — 85018 HEMOGLOBIN: CPT | Performed by: INTERNAL MEDICINE

## 2021-06-27 PROCEDURE — 97116 GAIT TRAINING THERAPY: CPT | Mod: GP | Performed by: PHYSICAL THERAPIST

## 2021-06-27 PROCEDURE — 250N000013 HC RX MED GY IP 250 OP 250 PS 637: Performed by: PHYSICIAN ASSISTANT

## 2021-06-27 PROCEDURE — 120N000009 HC R&B SNF

## 2021-06-27 PROCEDURE — 70450 CT HEAD/BRAIN W/O DYE: CPT

## 2021-06-27 PROCEDURE — 70450 CT HEAD/BRAIN W/O DYE: CPT | Mod: 26 | Performed by: RADIOLOGY

## 2021-06-27 PROCEDURE — 99207 PR CDG-CODE CATEGORY CHANGED: CPT | Performed by: PHYSICIAN ASSISTANT

## 2021-06-27 PROCEDURE — 97110 THERAPEUTIC EXERCISES: CPT | Mod: GP | Performed by: PHYSICAL THERAPIST

## 2021-06-27 RX ADMIN — FUROSEMIDE 40 MG: 40 TABLET ORAL at 08:52

## 2021-06-27 RX ADMIN — PANTOPRAZOLE SODIUM 40 MG: 40 TABLET, DELAYED RELEASE ORAL at 16:37

## 2021-06-27 RX ADMIN — LINACLOTIDE 290 MCG: 145 CAPSULE, GELATIN COATED ORAL at 07:13

## 2021-06-27 RX ADMIN — LEVETIRACETAM 1250 MG: 750 TABLET, FILM COATED ORAL at 08:50

## 2021-06-27 RX ADMIN — RIVAROXABAN 15 MG: 15 TABLET, FILM COATED ORAL at 08:50

## 2021-06-27 RX ADMIN — CALCIUM POLYCARBOPHIL 625 MG: 625 TABLET, FILM COATED ORAL at 08:50

## 2021-06-27 RX ADMIN — SULFAMETHOXAZOLE AND TRIMETHOPRIM 1 TABLET: 400; 80 TABLET ORAL at 08:50

## 2021-06-27 RX ADMIN — LEVETIRACETAM 1250 MG: 750 TABLET, FILM COATED ORAL at 21:21

## 2021-06-27 RX ADMIN — FLUOXETINE HYDROCHLORIDE 60 MG: 20 CAPSULE ORAL at 08:50

## 2021-06-27 RX ADMIN — MENTHOL 1 PATCH: 205.5 PATCH TOPICAL at 21:23

## 2021-06-27 RX ADMIN — BUSPIRONE HYDROCHLORIDE 30 MG: 15 TABLET ORAL at 08:51

## 2021-06-27 RX ADMIN — RIVAROXABAN 15 MG: 15 TABLET, FILM COATED ORAL at 17:59

## 2021-06-27 RX ADMIN — BUSPIRONE HYDROCHLORIDE 30 MG: 15 TABLET ORAL at 21:22

## 2021-06-27 RX ADMIN — ONDANSETRON HYDROCHLORIDE 4 MG: 4 TABLET, FILM COATED ORAL at 21:22

## 2021-06-27 RX ADMIN — AMLODIPINE BESYLATE 5 MG: 5 TABLET ORAL at 08:51

## 2021-06-27 RX ADMIN — DOCUSATE SODIUM 100 MG: 100 CAPSULE, LIQUID FILLED ORAL at 21:21

## 2021-06-27 RX ADMIN — DOCUSATE SODIUM 100 MG: 100 CAPSULE, LIQUID FILLED ORAL at 08:52

## 2021-06-27 RX ADMIN — PANTOPRAZOLE SODIUM 40 MG: 40 TABLET, DELAYED RELEASE ORAL at 07:13

## 2021-06-27 RX ADMIN — QUETIAPINE FUMARATE 25 MG: 25 TABLET ORAL at 21:21

## 2021-06-27 RX ADMIN — ACETAMINOPHEN 650 MG: 325 TABLET, FILM COATED ORAL at 23:07

## 2021-06-27 RX ADMIN — TEMOZOLOMIDE 250 MG: 250 CAPSULE ORAL at 21:23

## 2021-06-27 RX ADMIN — SENNOSIDES 2 TABLET: 8.6 TABLET ORAL at 21:25

## 2021-06-27 NOTE — PROGRESS NOTES
Transitional Care Unit  Progress Note  Miriam Leija PA-C  6/27/21          Assessment & Plan:   Olga Bailey is a 75 year old female with a hx of glioblastoma multiforme s/p resection (2/23/21), HTN, GERD, asthma, anxiety and depression. She was initially admitted to [facility] from 4/16-5/15 for acute hypoxic respiratory failure 2/2 PJP pneumonia. She was discharge to  TCU but required readmission to [facility] on 5/26 for seizure activity. During her hospitalization her Keppra dose was increased, steroids were started and she remained seizure free. She was transferred back to  TCU on 6/5 for ongoing rehabilitation.     # Glioblastoma multiforme s/p resection (2/23/21)  # Seizures   S/p resection in 2/23/21. Follows with Dr. Baker, Oncology as well as Dr. Spann with Radiation Oncology. Completed 15/15 sessions of radiation on 5/27. Heme/Onc initially concerned she may not be candidate for chemotherapy due to poor performance status. Had breakthrough seizure 5/26 prompting inpatient admission. She was started on Dexamethasone taper (completed 6/9), and Keppra dose was increased to 1250mg BID. On 6/16, there was concern for possible recurrent seizure like activity as Health Psychologist reported witnessing the patient have 3 episodes where her eyes closed and she licked her lips during their 10 minute conversation. This had not been witnessed by staff prior to this. Discussed with Neurology and patient had routine EEG on 6/16 without epileptiform discharges or seizures but with some mild abnormal slowing of background activity. During this period of time, Medicine noticed patient being more sedated, therefore Doxepin discontinued 6/15 and Seroquel 25mg BID discontinued and evening dose decreased from 50mg to 25mg. Now mentation much improved. No recurrent episodes as discussed. Suspect sedation was culprit. 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    - Seizure precautions  - Continue Keppra 1250mg BID  - PT/OT   - RN to notify medicine with any sign of seizure activity: change in mental status, lick smacking, jerking, change in neurological exam, etc. And would discuss with Neurology  - Continue Temodar (cycle 1) at bedtime with Zofran 30-60mg prior x 5 doses (start date 6/24)      # Bilateral lower extremity DVT s/p IVC filter (4/16/21)  # History of right retroperitoneal hematoma (4/14/21)   Ultrasound 3/29 revealed BLE DVTs. CT negative for PE. Started on IV heparin, transitioned to Lovenox 70 mg. On 4/14 developed spontaneous retroperitoneal bleed requiring 4 units PRBCs. IVC filter placed 4/16. Vascular surgery consulted at that time - and repeat CT with stable area of bleeding, therefore no need for surgical intervention. Eventually resumed on Lovenox 40 mg daily for prophylaxis. Per staff message with Dr. Baker, recommend Lovenox 1mg/kg BID vs xarelto 20 mg daily. Pt and family elected to wait until formal discussion with Dr. Baker at clinic appointment 6/23 following which they elected to switch to xarelto.  - Continue Xarelto 15 mg BID x 21 days (through 7/14) then 20 mg QD  - CBC qMTh  - Continue IVC filter  - Follow up with Heme/Onc as discussed with Dr. Baker     MDD  [place]  Follows with Psychiatry and Psychology as outpatient. Seeing Health Psychology during recent admission and TCU stay. Was started on Seroquel 25mg BID + 50mg at bedtime and Doxepin 3mg at bedtime in 4/2021 for anxiety/insomnia, which anxiety has now improved. Given concern for daytime sedation, discontinued Doxepin on 6/15 and discontinued Seroquel 25mg BID scheduled on 6/17 and decreased bedtime Seroquel to 25mg which significant improvement.   - Health Psychology following weekly at TCU 6/23  - Continue PTA Buspar 30 mg BID, Prozac 60 mg daily  - Continue Seroquel 25mg at bedtime      Dysphagia. SLP following. Had VFSS 4/21 and 5/26 showing silent aspiration. Repeat study 6/11 with  no aspiration or flash penetration. Progressed to DD2 diet w/ thin liquids 6/12 and DD3 diet with thin liquids on 6/17.   - SLP following  - DD3 with thin liquids     # Goals of care:   Initial plan was for patient to discharge to daughter's home with home cares. However, daughter La Nena expressed concerns regarding having enough support at home for this, as well as her home having stairs. Had care conference 6/22. May need to consider LTC facility pending course. PT/OT dates extended to 7/9.       Stable medical issues:  GERD. Continue PTA PPI BID  Mild intermittent asthma. Continue Albuterol PRN  HTN. Continue PTA Amlodipine 5mg daily and Lasix 40mg daily with hold parameters  Slow transit constipation. Having regular bowel movements. No acute concerns.Continue PTA Linzess, Miralax and Simethicone, fiber supplementation daily.        Resolved problems:  Sedation. Had concerns for increased intermittent daytime sleepiness on 6/15-6/16. Improved with medication adjustments: Discontinuing Doxepin, discontinuing daytime Seroquel and decreasing Seroquel dose at bedtime from 50mg to 25mg. No infectious s/sx, UA negative. EEG negative for seizures on 6/16. No focal neurological deficits. Mentation at baseline. Monitor.  Recent PJP pneumonia. Had admission for [facility] in 4/2021 for PJP pneumonia. Now on bactrim once daily. Continue.    Recent UTI. Completed course of oral cefdinir on 5/31.       Diet and/or tube feedings: DD3 with thin liquids  DVT/GI prophylaxis: Enoxaparin (Lovenox) SQ  Indications for psychotropic medications: Buspar, prozac, seroquel and lowest effective dose for MDD and [place]  Code status discussed on admission: Full Code    Mayra Leija PA-C  Hospitalist Service  [phone]         Consults:   none         Discharge Planning:   TBD- home vs LTACH pending progression with therapies on 7/10        Interval History:   Continues to participate in therapies, but is not making as much progress a she would  "have hoped. Tolerating her chemo meds well at night and has not had any significant side effects. Complains of a R sided temporal/facial headache that recurs every night starting at about 7/8 pm and continues until she goes to sleep. Tylenol helps. No associated visual changes, nausea or vomiting. Also complains of ongoing poor appetite, but thinks that this is more related to an unappetizing menu. No other complaints at this time.          Physical Exam:   Blood pressure 114/67, pulse 75, temperature 97  F (36.1  C), temperature source Oral, resp. rate 16, height 1.6 m (5' 2.99\"), weight 68.9 kg (151 lb 12.8 oz), SpO2 93 %.  GENERAL: Awake. Appears to be resting comfortably. NAD.   HEENT: NC/AT. Anicteric sclera. Mucous membranes moist.  CV: RRR, S1S2. No appreciable murmurs, rubs, or gallops.   RESPIRATORY: Normal respiratory effort on RA. Lungs CTAB without wheezes, rales or rhonchi.   GI: Soft, non-tender, and non-distended with bowel sounds present in all quadrants.  EXTREMITIES: No peripheral edema. Warm & well perfused.  NEUROLOGIC: Alert and orientated x 3. CN II-XII grossly intact. No focal deficits. Pronator drift in R hand, unchanged. Some word finding difficulty.   MUSCULOSKELETAL: No joint swelling or tenderness.   SKIN: No jaundice. No rashes or lesions.         "

## 2021-06-27 NOTE — PLAN OF CARE
"RN: Pt was found on the floor lying on her left side and yelling for help. Pt was alert and oriented, denied LOC after fall. Pt reports hitting the head and C/O HA.  Pt was transferred to bed using liko lift. VSS. Pt denies pain in all the extremities with ROM. No redness or bruise noticed at this time. Neuro intact. ICE pack applied to pt's head. Pt stated: \"I got up and lost balance\". ML notified. See order in the system. Bed / chair alarm ordered.  "

## 2021-06-27 NOTE — PLAN OF CARE
"VS: Vital signs:  Temp: 97  F (36.1  C) Temp src: Oral BP: 114/67 Pulse: 75   Resp: 16 SpO2: 93 % O2 Device: None (Room air)   Height: 160 cm (5' 2.99\") Weight: 68.9 kg (151 lb 12.8 oz)  Estimated body mass index is 26.9 kg/m  as calculated from the following:    Height as of this encounter: 1.6 m (5' 2.99\").    Weight as of this encounter: 68.9 kg (151 lb 12.8 oz).         O2: RA sats >90 %   Output: PureWICK   Last BM: 6/26   Activity: Up with PT participating in therapies   Skin: RLE skin tear,skin generally fragile   Pain: Denies   CMS: Intact   Dressing: RLE shin,Mepilex CDI   Diet: Regular   LDA: Purewick   Equipment: Purewick,walker,GB   Plan: Continue with POC   Additional Info: Patient alert and oriented x 4.Seizure precaution,upper bed side rails padded.VSS.Denies pain,SOB,chest pain.SBA with a walker,participated in therapies.Family comes to visit.pleasant.Potassium level to be drawn tomorrow.           Patient's most recent vital signs are:     Vital signs:  BP: 114/67  Temp: 97  HR: 75  RR: 16  SpO2: 93 %     Patient does not have new respiratory symptoms.  Patient does not have new sore throat.  Patient does not have a fever greater than 99.5.         "

## 2021-06-27 NOTE — PLAN OF CARE
Pt is alert and oriented. Up with assist of 1 and walker to BSC. Denies shortness of breath/chest pain. Has been continent of bowel/bladder tonight. Purewick on at bedtime. LBM today. RLE skin tear dressing changed. Takes meds whole without difficulty. Denies pain. Continue POC.       Patient's most recent vital signs are:     Vital signs:  BP: 107/59  Temp: 98.1  HR: 87  RR: 18  SpO2: 97 %     Patient does not have new respiratory symptoms.  Patient does not have new sore throat.  Patient does not have a fever greater than 99.5.

## 2021-06-27 NOTE — PLAN OF CARE
Alert and oriented. Able to make needs known. Call light in each. Offers no C/O pain/discomfort during night. Pure wick in place. Last BM on 6/26. Appears to be sleeping during rounds. Seizure precautions maintained. Bed alarm on for safety. Continue with plan of care.

## 2021-06-27 NOTE — PLAN OF CARE
Discharge Planner Post-Acute Rehab PT:     Discharge Plan: home with daughter's assist vs home to senior apartment and 24/7 supervision with hired in help or placement     Precautions: fall, hx of recent seizures, swallowing     Current Status:  Bed Mobility: variable cga to MAX A, Mod assist to scoot hips  Transfer: cga to min A stand pivot with FWW, difficulty with anterior WS coming to stand, also WB through forefoot and has posterior lean  Gait: 200 feet with FWW Gerard and w/c follow for safety  Stairs: 6 stairs, 2 rails, Gerard  Balance: cga to occasional min A when fatigued.     Assessment:  pt still demonstrates LOB posteriorly with static stand,  decreased ankle strategy to correct. Pt had not LOB with gait when limited to shorter distance of 150 ft.    Other Barriers to Discharge (DME, Family Training, etc): functional tolerance, medical complexity, functional status, neuro involvement

## 2021-06-28 ENCOUNTER — APPOINTMENT (OUTPATIENT)
Dept: OCCUPATIONAL THERAPY | Facility: SKILLED NURSING FACILITY | Age: 76
End: 2021-06-28
Payer: MEDICARE

## 2021-06-28 ENCOUNTER — HOSPITAL ENCOUNTER (OUTPATIENT)
Facility: CLINIC | Age: 76
Discharge: HOME | End: 2021-06-28
Attending: INTERNAL MEDICINE | Admitting: INTERNAL MEDICINE
Payer: MEDICARE

## 2021-06-28 ENCOUNTER — APPOINTMENT (OUTPATIENT)
Dept: SPEECH THERAPY | Facility: SKILLED NURSING FACILITY | Age: 76
End: 2021-06-28
Payer: MEDICARE

## 2021-06-28 ENCOUNTER — APPOINTMENT (OUTPATIENT)
Dept: PHYSICAL THERAPY | Facility: SKILLED NURSING FACILITY | Age: 76
End: 2021-06-28
Payer: MEDICARE

## 2021-06-28 LAB
ANION GAP SERPL CALCULATED.3IONS-SCNC: 5 MMOL/L (ref 3–14)
BASOPHILS # BLD AUTO: 0 10E9/L (ref 0–0.2)
BASOPHILS NFR BLD AUTO: 0.5 %
BUN SERPL-MCNC: 10 MG/DL (ref 7–30)
CALCIUM SERPL-MCNC: 9.5 MG/DL (ref 8.5–10.1)
CHLORIDE SERPL-SCNC: 105 MMOL/L (ref 94–109)
CO2 SERPL-SCNC: 29 MMOL/L (ref 20–32)
CREAT SERPL-MCNC: 0.54 MG/DL (ref 0.52–1.04)
DIFFERENTIAL METHOD BLD: ABNORMAL
EOSINOPHIL # BLD AUTO: 0.3 10E9/L (ref 0–0.7)
EOSINOPHIL NFR BLD AUTO: 4.2 %
ERYTHROCYTE [DISTWIDTH] IN BLOOD BY AUTOMATED COUNT: 14.8 % (ref 10–15)
GFR SERPL CREATININE-BSD FRML MDRD: >90 ML/MIN/{1.73_M2}
GLUCOSE SERPL-MCNC: 106 MG/DL (ref 70–99)
HCT VFR BLD AUTO: 34.5 % (ref 35–47)
HGB BLD-MCNC: 11.2 G/DL (ref 11.7–15.7)
IMM GRANULOCYTES # BLD: 0 10E9/L (ref 0–0.4)
IMM GRANULOCYTES NFR BLD: 0.7 %
LYMPHOCYTES # BLD AUTO: 0.4 10E9/L (ref 0.8–5.3)
LYMPHOCYTES NFR BLD AUTO: 6.3 %
MCH RBC QN AUTO: 30.4 PG (ref 26.5–33)
MCHC RBC AUTO-ENTMCNC: 32.5 G/DL (ref 31.5–36.5)
MCV RBC AUTO: 94 FL (ref 78–100)
MONOCYTES # BLD AUTO: 0.6 10E9/L (ref 0–1.3)
MONOCYTES NFR BLD AUTO: 10.2 %
NEUTROPHILS # BLD AUTO: 4.6 10E9/L (ref 1.6–8.3)
NEUTROPHILS NFR BLD AUTO: 78.1 %
NRBC # BLD AUTO: 0 10*3/UL
NRBC BLD AUTO-RTO: 0 /100
PLATELET # BLD AUTO: 312 10E9/L (ref 150–450)
POTASSIUM SERPL-SCNC: 3.5 MMOL/L (ref 3.4–5.3)
RBC # BLD AUTO: 3.69 10E12/L (ref 3.8–5.2)
SODIUM SERPL-SCNC: 139 MMOL/L (ref 133–144)
WBC # BLD AUTO: 5.9 10E9/L (ref 4–11)

## 2021-06-28 PROCEDURE — 97602 WOUND(S) CARE NON-SELECTIVE: CPT

## 2021-06-28 PROCEDURE — 80048 BASIC METABOLIC PNL TOTAL CA: CPT | Performed by: PHYSICIAN ASSISTANT

## 2021-06-28 PROCEDURE — 97110 THERAPEUTIC EXERCISES: CPT | Mod: GO

## 2021-06-28 PROCEDURE — 250N000013 HC RX MED GY IP 250 OP 250 PS 637: Performed by: PHYSICIAN ASSISTANT

## 2021-06-28 PROCEDURE — 85025 COMPLETE CBC W/AUTO DIFF WBC: CPT | Performed by: PHYSICIAN ASSISTANT

## 2021-06-28 PROCEDURE — 99309 SBSQ NF CARE MODERATE MDM 30: CPT | Performed by: PHYSICIAN ASSISTANT

## 2021-06-28 PROCEDURE — 97110 THERAPEUTIC EXERCISES: CPT | Mod: GP

## 2021-06-28 PROCEDURE — 97535 SELF CARE MNGMENT TRAINING: CPT | Mod: GO

## 2021-06-28 PROCEDURE — 120N000009 HC R&B SNF

## 2021-06-28 PROCEDURE — 36415 COLL VENOUS BLD VENIPUNCTURE: CPT | Performed by: PHYSICIAN ASSISTANT

## 2021-06-28 PROCEDURE — 250N000011 HC RX IP 250 OP 636: Performed by: INTERNAL MEDICINE

## 2021-06-28 PROCEDURE — 99207 PR CDG-CODE CATEGORY CHANGED: CPT | Performed by: PHYSICIAN ASSISTANT

## 2021-06-28 PROCEDURE — G0463 HOSPITAL OUTPT CLINIC VISIT: HCPCS | Mod: 25

## 2021-06-28 PROCEDURE — 92526 ORAL FUNCTION THERAPY: CPT | Mod: GN | Performed by: SPEECH-LANGUAGE PATHOLOGIST

## 2021-06-28 PROCEDURE — 250N000012 HC RX MED GY IP 250 OP 636 PS 637: Performed by: INTERNAL MEDICINE

## 2021-06-28 RX ADMIN — ACETAMINOPHEN 650 MG: 325 TABLET, FILM COATED ORAL at 08:26

## 2021-06-28 RX ADMIN — ACETAMINOPHEN 650 MG: 325 TABLET, FILM COATED ORAL at 03:02

## 2021-06-28 RX ADMIN — TEMOZOLOMIDE 250 MG: 250 CAPSULE ORAL at 21:55

## 2021-06-28 RX ADMIN — FUROSEMIDE 40 MG: 40 TABLET ORAL at 08:27

## 2021-06-28 RX ADMIN — BUSPIRONE HYDROCHLORIDE 30 MG: 15 TABLET ORAL at 08:30

## 2021-06-28 RX ADMIN — LEVETIRACETAM 1250 MG: 750 TABLET, FILM COATED ORAL at 08:26

## 2021-06-28 RX ADMIN — AMLODIPINE BESYLATE 5 MG: 5 TABLET ORAL at 08:30

## 2021-06-28 RX ADMIN — BUSPIRONE HYDROCHLORIDE 30 MG: 15 TABLET ORAL at 20:41

## 2021-06-28 RX ADMIN — PANTOPRAZOLE SODIUM 40 MG: 40 TABLET, DELAYED RELEASE ORAL at 06:25

## 2021-06-28 RX ADMIN — ACETAMINOPHEN 650 MG: 325 TABLET, FILM COATED ORAL at 13:01

## 2021-06-28 RX ADMIN — ONDANSETRON HYDROCHLORIDE 4 MG: 4 TABLET, FILM COATED ORAL at 20:41

## 2021-06-28 RX ADMIN — PANTOPRAZOLE SODIUM 40 MG: 40 TABLET, DELAYED RELEASE ORAL at 18:42

## 2021-06-28 RX ADMIN — QUETIAPINE FUMARATE 25 MG: 25 TABLET ORAL at 21:55

## 2021-06-28 RX ADMIN — CALCIUM POLYCARBOPHIL 625 MG: 625 TABLET, FILM COATED ORAL at 08:27

## 2021-06-28 RX ADMIN — LINACLOTIDE 290 MCG: 145 CAPSULE, GELATIN COATED ORAL at 06:25

## 2021-06-28 RX ADMIN — SULFAMETHOXAZOLE AND TRIMETHOPRIM 1 TABLET: 400; 80 TABLET ORAL at 08:27

## 2021-06-28 RX ADMIN — ACETAMINOPHEN 650 MG: 325 TABLET, FILM COATED ORAL at 18:40

## 2021-06-28 RX ADMIN — FLUOXETINE HYDROCHLORIDE 60 MG: 20 CAPSULE ORAL at 08:26

## 2021-06-28 RX ADMIN — LEVETIRACETAM 1250 MG: 750 TABLET, FILM COATED ORAL at 20:41

## 2021-06-28 NOTE — CONSULTS
[facility] Nurse Inpatient Wound Assessment   Reason for consultation: Evaluate and treat right shin skin tear, head hematoma    Assessment  Right shin wound due to Skin Tear  Status: initial assessment     Posterior head hematoma due to Trauma, fall  Status: initial assessment     Hematoma on back of head should be assessed regularly for tissue necrosis or fluid collection needing draining.     Treatment Plan  Right shin wound: Every other day: wash wound gently with wound cleanser and gauze. Cover open area with Adaptic, hold in place with Mepilex.   Posterior head hematoma: continue to ice. No open areas needing dressings.   Orders Written  Recommended provider order: None, at this time  WO Nurse follow-up plan:weekly  Nursing to notify the Provider(s) and re-consult the WO Nurse if wound(s) deteriorates or new skin concern.    Patient History  According to provider note(s):  Per Mayra Leija PA-C on 6/27/2021: Olga Bailey is a 75 year old female with a hx of glioblastoma multiforme s/p resection (2/23/21), HTN, GERD, asthma, anxiety and depression. She was initially admitted to [facility] from 4/16-5/15 for acute hypoxic respiratory failure 2/2 PJP pneumonia. She was discharge to  TCU but required readmission to [facility] on 5/26 for seizure activity. During her hospitalization her Keppra dose was increased, steroids were started and she remained seizure free. She was transferred back to  TCU on 6/5 for ongoing rehabilitation.     Objective Data  Containment of urine/stool: Brief    Active Diet Order  Orders Placed This Encounter      Combination Diet Dysphagia Diet Level 3: Advanced; Thin Liquids (water, ice chips, juice, milk gelatin, ice cream, etc)      Output:   I/O last 3 completed shifts:  In: -   Out: 175 [Urine:175]    Risk Assessment:   Sensory Perception: 4-->no impairment  Moisture: 3-->occasionally moist  Activity: 3-->walks occasionally  Mobility: 3-->slightly limited  Nutrition: 3-->adequate  Friction  and Shear: 2-->potential problem  Gerardo Score: 18                          Labs:   Recent Labs   Lab 06/28/21  0817 06/22/21  1305 06/22/21  1305   ALBUMIN  --   --  3.5   HGB 11.2*   < > 12.8   WBC 5.9   < > 7.3    < > = values in this interval not displayed.       Physical Exam  Areas of skin assessed: focused bilateral lower legs, head    Wound Location:  Right shin    6/28 Right shin    Date of last photo 6/28/2021  Wound History: Skin tear    Wound Base: 100 % dermis     Palpation of the wound bed: normal      Drainage: scant     Description of drainage: serosanguinous     Measurements (length x width x depth, in cm)  2  x 2  x  0.1 cm   Periwound skin: intact      Color: pink      Temperature: normal   Odor: none  Pain: denies    Location: Occiput    6/28 Occiput    S/p fall, large hematoma noted on back of head. Area should be assessed regularly for tissue necrosis or extensive fluid collection needing draining.     Skin is firm, tender to the touch. No open skin noted on assessment today.     Interventions  Visual inspection and assessment completed   Wound Care Rationale Promote moist wound healing without tissue dehydration   Wound Care: done per plan of care  Supplies: floor stock  Current off-loading measures: Pillows  Current support surface: Standard  Atmos Air mattress  Education provided to: importance of repositioning and wound progress  Discussed plan of care with Patient and Nurse    Belen Nye RN, CWOCN

## 2021-06-28 NOTE — PLAN OF CARE
"RN POST FALL ASSESSMENT: Pt is alert and oriented. Able to recall the fall incident yesterday but will not give details. C/o frontal headache which she attributed to the fall. Tylenol 650 mg tab-  Helped per pt. Occipital area hematoma and abrasion covered by mepilex that is not sitcking on the scalp. Removed and noted small amount of dry blood on it. Abd pad applied but not taped unless hair is shaved. Checked abd  Pad around 0630 scant amount of dry blood. VSS: /68 (BP Location: Right arm)   Pulse 88   Temp 96.7  F (35.9  C) (Oral)   Resp 18   Ht 1.6 m (5' 2.99\")   Wt 68.9 kg (151 lb 12.8 oz)   SpO2 94%  RA. Bed alarm maintained. Pt has no c/o SOB and no s/s of respiratory issue noted at RA. Appear to be sleeping/resting between cares/ meds. Continue with plan of care.    Patient's most recent vital signs are:     Vital signs:  BP: 117/62  Temp: 96.6  HR: 86  RR: 16  SpO2: 99 %     Patient does not have new respiratory symptoms.  Patient does not have new sore throat.  Patient does not have a fever greater than 99.5.         "

## 2021-06-28 NOTE — PLAN OF CARE
Discharge Planner Post-Acute Rehab PT:     Discharge Plan: home with daughter's assist vs home to senior apartment and 24/7 supervision with hired in help or placement     Precautions: fall, hx of recent seizures, swallowing     Current Status:  Bed Mobility: variable cga to MAX A, Mod assist to scoot hips  Transfer: cga to min A stand pivot with FWW, difficulty with anterior WS coming to stand, also WB through forefoot and has posterior lean  Gait: 200 feet with FWW Gerard and w/c follow for safety  Stairs: 6 stairs, 2 rails, Gerard  Balance: cga to occasional min A when fatigued.     Assessment:  PT: Pt reports not feeling well from fall yesterday, requests to perform abbreviated session in bed. To challenge pt's functional strength and IND, supine bed exercises were performed, see flowsheet for specifics, aim to get OOB tomorrow.       Other Barriers to Discharge (DME, Family Training, etc): functional tolerance, medical complexity, functional status, neuro involvement

## 2021-06-28 NOTE — PROGRESS NOTES
Met with patient, this morning, to review the details of fall last evening. Pt stated that she was getting up to fill the water in her flowers. Pt also stated that she did not have the call light on. Reviewed call light times with patient - she stated that she didn't recall any long wait times over the weekend. She reported pain in her head that was not yet relieved by the Tylenol that was administered this morning. Writer provided an ice pack. Writer explained the bed-alarm and patient was amenable to the plan to employ a bed alarm. Pt stated that she had no additional needs and writer stated that I would check back in with patient, later today.

## 2021-06-28 NOTE — PLAN OF CARE
"Discharge Planner Post-Acute Rehab SLP:      Discharge Plan: tbd     Precautions: Dysphagia     Current Status:  Communication: Min difficulties with conversational tasks (word finding) and able to follow directions without difficulties.  Cognition: mild deficits at least since GBM resection 2/2021, had been seen prior admissions/OP, probable \"plateau\" as abilities appear unchanged, likely functional for needs.   Swallow: Dysphagia diet 3 and thin liquids per video swallow completed 6/11.     Assessment: . SLP: pt seen for meal, swallowing, assessed with regular diet, pt slow to eat/chew, but no outward sx aspiration, good oral clearing.  will continue NDD3 diet, but assess at least once more before advancing, as well as assessing to be able to use straw again     Other Barriers to Discharge (Family Training, etc): tbd  "

## 2021-06-28 NOTE — PLAN OF CARE
VS: See below   O2: none   Output: Patient uses pur wick see flow sheets   Last BM: 6/28/21, large incontinent   Activity: Bedrest, patient didn't want to get up in the chair for lunch   Skin: mutiple bruises all over body, hematoma on the back of her head   Pain: C/O of a headache, medicated with Tyenol 650 mg every 4 hours prn   CMS: intact   Dressing: Right shin, WOC applied, see new orders   Diet: Combination Dyshagia,thin liquids,no straw    LDA: none   Equipment: Liko lift, bed alarm   Plan: Continue with cares and therapies. USE ALARMS.   Additional Info: Fall 6/27/21           Patient's most recent vital signs are:     Vital signs:  BP: 119/67  Temp: 96.7  HR: 88  RR: 18  SpO2: 94 %     Patient does not have new respiratory symptoms.  Patient does not have new sore throat.  Patient does not have a fever greater than 99.5.

## 2021-06-28 NOTE — PLAN OF CARE
Pt is disoriented to time, but had a fall at 1900, and was only oriented to self and place. Pt had a fall unwitnessed,head skin abrasion has meplex on, CT was completed for head and back/spine. Given tylenol for pain. Pt xerelto was given as scheduled two hours before the fall. Pt had skin tear on right leg, has meplex. Pt is incontinent of B &B, uses purewick and had LBM 6/27. Pt is on seizure precaution.  Continue with POC.

## 2021-06-28 NOTE — PLAN OF CARE
There is a hematoma with skin abrasion noticed later on the back of pt's head, cleansed area with wound cleanser, dressing applied. ML is aware of this and he will come to see the patient again after CT scan.

## 2021-06-28 NOTE — PLAN OF CARE
Patient alert all this shift. Patient's hematoma on her head has not changed in appearance all day.  I have medicated her x 2 with Tylenol for c/o of her head aching. Patient did not want to use an ice pack to her head.  See flow sheets for neuro check done this am.  Bed alarm on and every two hour checks completed.

## 2021-06-28 NOTE — PROGRESS NOTES
Cross coverage     Patient was found on the floor by nursing staff. Patient didn't have a bed alarm. Patient reports hitting her head but no LOC. She is complaining of pain in her head, she was found with hematoma on the right side of her head.   I evaluated the patient. She was alert and oriented, no focal deficits.   I called patient's daughter and gave her an update. Patient is on Xarelto.   Plan: Check CT head, CT cervical spine and HGB. Frequent neuro checks. Continue following the patient.     Addendum     No active bleeding, no significant laceration. + scalp hematoma. HGB 11.5.   Hold Xarelto. Reassess in the morning.     CT head   There is a posterior right parietal scalp hematoma. The bony calvaria  and the bones of the skull base are normal. The visualized portions of  the paranasal sinuses and mastoid air cells are clear.                                                            Impression:     1. No acute intracranial pathology. There is no evidence for  intracranial hemorrhage.  2. Mild generalized cerebral atrophy and Leukoaraiosis.      I called patient's daughter and gave her an update. Frequent neuro checks. Keep bed alarm.

## 2021-06-28 NOTE — PLAN OF CARE
"Discharge Planner Post-Acute Rehab OT:      Discharge Plan: Pt reports plan remains to return to her daughter's home with intermittent supervision.  Pt also anticipates continued assistance with finances, medication management, meal prep, general IADL, and bathing.  Pt would need to be independent and safe with toileting and light meal prep.  Pt is progressing but as of tx on 6/17, anticipate she will still need some degree of assist with mobility and ADLs at discharge.      Precautions: falls     Current Status:  ADLs: CGA-Min A sit <> stand with FWW, CGA-Min A ambulation with FWW and w/c follow d/t fatigue, Mod A LB dressing from EOB but min A when reclined in bed, SBA-Min A UB dressing, SBA bed mobility when given extra time.   IADLs: NT  Vision/Cognition: Pt oriented with short term memory deficits present.  Pt reports some difficulty reading as well     Assessment: Pt had fall last evening resulting in hematoma on occipital area. Pt currently c/o headache stating, \"it really hurts\" and declined OOB/EOB activity d/t not feeling up for it. OTR/L present at start of session for YOG[facility] 10th visit. Reviewed goals and POC. Plan to continue with current POC. At end of session, repositioned patient to R sidelying with support of pillows to help alleviate head pain/swelling. Throughout session, monitored for cognitive changes. Pt able to recall fall event, appearing alert throughout session yet increased fatigue/weakness, and responded appropriately to verbal cuing.    Care conference held prior to session with SW, PA, OT, PT, DON, patient, patient's daughter, and patient's son (children were remote). Update give on progress towards OT goals and current functional status. At this time it is recommended for patient to have 24/7 supervision along with hands on assist for functional mobility with FWW, bed mobility, toileting, bathing, and dressing d/t pt's limited insight into safety awareness and fatigue levels during " mobility. Extended OT POC to further progress functional goals in order to determine discharge needs and decrease level of assist. SW working with patient and family to discuss next steps and whether patient will return to daughter's home, her current senior care facility with hired in help, or another facility. Updated POC to last day therapy on 7/9.     Other Barriers to Discharge (DME, Family Training, etc): Safety, family training.

## 2021-06-28 NOTE — PROGRESS NOTES
[facility] Transitional Care Unit  Internal Medicine Progress Note    TCU Day # 23    Assessment & Plan:   Olga Bailey is a 75 year old female with a hx of glioblastoma multiforme s/p resection (2/23/21), HTN, GERD, asthma, anxiety and depression. She was initially admitted to [facility] from 4/16-5/15 for acute hypoxic respiratory failure 2/2 PJP pneumonia. She was discharged to  TCU but required readmission to [facility] on 5/26 for seizure activity. During her hospitalization her Keppra dose was increased, steroids were started and she remained seizure free. She was transferred back to  TCU on 6/5 for ongoing rehabilitation.      # Glioblastoma multiforme s/p resection (2/23/21)  # Seizures   S/p resection in 2/23/21. Follows with Dr. Baker, Oncology as well as Dr. Spann with Radiation Oncology. Completed 15/15 sessions of radiation on 5/27, last saw Rad Onc 6/24 with plan to follow up as needed. Heme/Onc initially concerned she may not be candidate for chemotherapy due to poor performance status. Had breakthrough seizure 5/26 prompting inpatient admission. She was started on Dexamethasone taper (completed 6/9), and Keppra dose was increased to 1250mg BID. On 6/16, there was concern for possible recurrent seizure like activity as Health Psychologist reported witnessing the patient have 3 episodes where her eyes closed and she licked her lips during their 10 minute conversation. This had not been witnessed by staff prior to this. Discussed with Neurology and patient had routine EEG on 6/16 without epileptiform discharges or seizures but with some mild abnormal slowing of background activity. During this period of time, Medicine noticed patient being more sedated, therefore Doxepin discontinued 6/15 and Seroquel 25mg BID discontinued and evening dose decreased from 50mg to 25mg. Now mentation much improved. No recurrent episodes as discussed. Suspect sedation was culprit. Has pronator drift to R arm at  baseline, and chronically does not get the year right after her tumor resection. Was seen by Dr. Baker on 6/22 with plans to start chemotherapy with Temodar. MRI Brain 6/22 w/ interval increase in seize of enhancing parenchymal nodule just superior to left parietal lobe resection cavity, most c/f disease recurrence/progression.   - Seizure precautions  - Continue Keppra 1250mg BID  - PT/OT   - RN to notify medicine with any sign of seizure activity: change in mental status, lick smacking, jerking, change in neurological exam, etc. And would discuss with Neurology  - Continue Temodar (cycle 1) at bedtime with Zofran 30-60mg prior x 5 doses (start date 6/24-6/28)   - Follow up with Dr. Baker in 1 month with labs     # Fall  # Posterior scalp hematoma:  Patient had a fall on evening of 6/27. She reports getting up to water the plants and got off balance and fell. Denies dizziness, lightheadedness, chest pain, SOB prior to or after fall. She reports brief LOC. CT Head and CT C-spine negative for intracranial hemorrhage or acute fracture. Xarelto was held 6/27. Today, patient reports posterior headache over hematoma - hematoma appears stable. No focal neurological deficits on exam. A&Ox3 at baseline. No sensitivity to light/sound or vomiting. Hgb stable.   - WOCN consulted for hematoma    - Monitor hematoma regularly for any sign of tissue necrosis or need for drainage   - Ice PRN   - Tylenol PRN for pain   - Will hold Xarelto today - tentative plan is if hematoma and Hgb stable in AM, will resume in AM.    - Continue neurological checks - please notify medicine if any sedation, or acute change in headache or change in neurological exam  - Bed alarm   - Addendum: Updated Dr. Baker, Oncology. She will send message to  to follow-up with Hematology as previously referred at clinic visit 6/22 to follow-up for risk/benefit discussion regarding anticoagulation. She agreed no contraindication to resumption in AM. Updated  patient and patient's daughter. If hgb stable, hematoma stable and neurological exam stable will resume Xarelto in AM (touch base with daughter La Nena beforehand)     # Bilateral lower extremity DVT s/p IVC filter (4/16/21)  # History of right retroperitoneal hematoma (4/14/21)   Ultrasound 3/29 revealed BLE DVTs. CT negative for PE. Started on IV heparin, transitioned to Lovenox 70 mg. On 4/14 developed spontaneous retroperitoneal bleed requiring 4 units PRBCs. IVC filter placed 4/16. Vascular surgery consulted at that time - and repeat CT with stable area of bleeding, therefore no need for surgical intervention. Eventually resumed on Lovenox 40 mg daily for prophylaxis. Per staff message with Dr. Baker, recommend Lovenox 1mg/kg BID vs xarelto 20 mg daily. Pt and family elected to wait until formal discussion with Dr. Baker at clinic appointment 6/23 following which they elected to switch to xarelto. Please see note from 6/22 for details.   - Continue Xarelto 15 mg BID x 21 days (through 7/14) then 20 mg every day. Holding since 6/27 evening due to fall + hematoma as above.   - CBC qMTh  - Continue IVC filter - Dr. Baker discussed may need to consider removal of IVC filter in the future and referred patient to Heme/Onc      # MDD  # PARIS  Follows with Psychiatry and Psychology as outpatient. Seeing Health Psychology during recent admission and TCU stay. Was started on Seroquel 25mg BID + 50mg at bedtime and Doxepin 3mg at bedtime in 4/2021 for anxiety/insomnia, which anxiety has now improved. Given concern for daytime sedation, discontinued Doxepin on 6/15 and discontinued Seroquel 25mg BID scheduled on 6/17 and decreased bedtime Seroquel to 25mg which significant improvement.   - Health Psychology following weekly at TCU - last seen 6/23  - Continue PTA Buspar 30 mg BID, Prozac 60 mg daily  - Continue Seroquel 25mg at bedtime      # Dysphagia: SLP following. Had VFSS 4/21 and 5/26 showing silent aspiration. Repeat  study 6/11 with no aspiration or flash penetration. Progressed to DD2 diet w/ thin liquids 6/12 and DD3 diet with thin liquids on 6/17.   - SLP following  - DD3 with thin liquids     # Goals of care:   Initial plan was for patient to discharge to daughter's home with home cares. However, daughter La Nena expressed concerns regarding having enough support at home for this, as well as her home having stairs. Had care conference 6/22. Patient and family considering LTC facility pending course. PT/OT dates extended to 7/9.      # Right shin wound: 2/2 skin tear. WOCN following.   - Wound cares: Every other day: wash wound gently with wound cleanser and gauze. Cover open area with Adaptic, hold in place with Mepilex.      Stable medical issues:  # GERD. Continue PTA PPI BID  # Mild intermittent asthma. Continue Albuterol PRN  # HTN. Continue PTA Amlodipine 5mg daily and Lasix 40mg daily with hold parameters  # Slow transit constipation. Having regular bowel movements. No acute concerns.Continue PTA Linzess, Miralax and Simethicone, fiber supplementation daily.         Resolved problems:  # Sedation. Had concerns for increased intermittent daytime sleepiness on 6/15-6/16. Improved with medication adjustments: Discontinuing Doxepin, discontinuing daytime Seroquel and decreasing Seroquel dose at bedtime from 50mg to 25mg. No infectious s/sx, UA negative. EEG negative for seizures on 6/16. No focal neurological deficits. Mentation at baseline. Monitor.  # Recent PJP pneumonia. Had admission for [facility] in 4/2021 for PJP pneumonia. Now on bactrim once daily. Continue.    # Recent UTI. Completed course of oral cefdinir on 5/31.        CODE: Full Code   DVT: On Xarelto (held due to fall and posterior scalp hematoma since 6/27)  Diet: DD3 with thin liquids  Indications for Psychotropic Medications: Buspar, Prozac, Seroquel at lowest effective dose for MDD and PARIS  Disposition: Tentative discharge date of 7/9 to LTC vs daughter's  "home pending course.     Rabia López PA-C  Hospitalist Service  Pager: [phone]  ________________________________________________________________    Subjective & Interval History:   Overnight, patient had a fall. She reports she was up watering her plants when she lost balance and fell. She hit her head on the floor and reports brief, few seconds of loss of consciousness. Denies chest pain, SOB, dizziness or lightheadedness prior to or after the fall.     CT Head and Cervical spine obtained. CT Head negative for intracranial hemorrhage. CT C-spine negative for acute fracture. Hemoglobin obtained yesterday evening at 11.2. She did suffer a posterior scalp hematoma. Xarelto was placed on hold 6/27 evening.    Currently, patient reports a headache and rates it a 5/10 in severity. She is icing her hematoma. No increase in size per patient. No change in vision or vomiting. No paresthesias or focal weakness. No signs of bleeding. She denies any fevers, chills, dysuria, frequency, urgency, diarrhea or constipation.  No other musculoskeletal injury.    Last 24 hour care team notes reviewed.   ROS: 4 point ROS (including Respiratory, CV, GI and ) was performed and negative unless otherwise noted in HPI.     Medications: Reviewed in EPIC.    Physical Exam:    Blood pressure 119/67, pulse 88, temperature 96.7  F (35.9  C), temperature source Oral, resp. rate 18, height 1.6 m (5' 2.99\"), weight 68.9 kg (151 lb 12.8 oz), SpO2 94 %.    GENERAL: Alert and oriented x 3. Lying in bed. NAD. Answering questions appropriately.   HEENT: Anicteric sclera. Mucous membranes moist. Posterior scalp hematoma, there is a quarter sized area of fluctuance. No drainage or active bleeding. No bony step-off. Mild tenderness to paraspinal musculature to cervical region.   CV: RRR. S1, S2. No murmurs appreciated.   RESPIRATORY: Effort normal on room air. Lungs CTAB with no wheezing, rales, rhonchi.   GI: Abdomen soft and non distended, " bowel sounds present. No tenderness, rebound, guarding.   NEUROLOGICAL: No focal deficits. Moves all extremities. PERRLA. CN III-XII grossly intact. Pronator drift to right upper extremity at baseline.   EXTREMITIES: No peripheral edema. Intact bilateral pedal pulses. No pain to palpation to upper and lower extremities.  SKIN: No jaundice. No rashes. Mild skin tear to right lower skin.     Lines/Tubes/Drains:    N/A

## 2021-06-29 ENCOUNTER — TELEPHONE (OUTPATIENT)
Dept: ONCOLOGY | Facility: CLINIC | Age: 76
End: 2021-06-29

## 2021-06-29 ENCOUNTER — APPOINTMENT (OUTPATIENT)
Dept: PHYSICAL THERAPY | Facility: SKILLED NURSING FACILITY | Age: 76
End: 2021-06-29
Payer: MEDICARE

## 2021-06-29 ENCOUNTER — APPOINTMENT (OUTPATIENT)
Dept: OCCUPATIONAL THERAPY | Facility: SKILLED NURSING FACILITY | Age: 76
End: 2021-06-29
Payer: MEDICARE

## 2021-06-29 LAB — HGB BLD-MCNC: 10.5 G/DL (ref 11.7–15.7)

## 2021-06-29 PROCEDURE — 97535 SELF CARE MNGMENT TRAINING: CPT | Mod: GO

## 2021-06-29 PROCEDURE — 85018 HEMOGLOBIN: CPT | Performed by: PHYSICIAN ASSISTANT

## 2021-06-29 PROCEDURE — 250N000013 HC RX MED GY IP 250 OP 250 PS 637: Performed by: PHYSICIAN ASSISTANT

## 2021-06-29 PROCEDURE — 120N000009 HC R&B SNF

## 2021-06-29 PROCEDURE — 97110 THERAPEUTIC EXERCISES: CPT | Mod: GO

## 2021-06-29 PROCEDURE — 36415 COLL VENOUS BLD VENIPUNCTURE: CPT | Performed by: PHYSICIAN ASSISTANT

## 2021-06-29 PROCEDURE — 97530 THERAPEUTIC ACTIVITIES: CPT | Mod: GP

## 2021-06-29 PROCEDURE — 99308 SBSQ NF CARE LOW MDM 20: CPT | Performed by: PHYSICIAN ASSISTANT

## 2021-06-29 PROCEDURE — 250N000011 HC RX IP 250 OP 636: Performed by: PHYSICIAN ASSISTANT

## 2021-06-29 RX ADMIN — QUETIAPINE FUMARATE 25 MG: 25 TABLET ORAL at 21:05

## 2021-06-29 RX ADMIN — PANTOPRAZOLE SODIUM 40 MG: 40 TABLET, DELAYED RELEASE ORAL at 16:32

## 2021-06-29 RX ADMIN — BUSPIRONE HYDROCHLORIDE 30 MG: 15 TABLET ORAL at 09:09

## 2021-06-29 RX ADMIN — SENNOSIDES 2 TABLET: 8.6 TABLET ORAL at 21:05

## 2021-06-29 RX ADMIN — SULFAMETHOXAZOLE AND TRIMETHOPRIM 1 TABLET: 400; 80 TABLET ORAL at 09:09

## 2021-06-29 RX ADMIN — CALCIUM POLYCARBOPHIL 625 MG: 625 TABLET, FILM COATED ORAL at 09:08

## 2021-06-29 RX ADMIN — LINACLOTIDE 290 MCG: 145 CAPSULE, GELATIN COATED ORAL at 06:55

## 2021-06-29 RX ADMIN — ACETAMINOPHEN 650 MG: 325 TABLET, FILM COATED ORAL at 06:55

## 2021-06-29 RX ADMIN — LEVETIRACETAM 1250 MG: 750 TABLET, FILM COATED ORAL at 09:08

## 2021-06-29 RX ADMIN — PANTOPRAZOLE SODIUM 40 MG: 40 TABLET, DELAYED RELEASE ORAL at 06:55

## 2021-06-29 RX ADMIN — FUROSEMIDE 40 MG: 40 TABLET ORAL at 09:16

## 2021-06-29 RX ADMIN — BUSPIRONE HYDROCHLORIDE 30 MG: 15 TABLET ORAL at 21:05

## 2021-06-29 RX ADMIN — ACETAMINOPHEN 650 MG: 325 TABLET, FILM COATED ORAL at 11:10

## 2021-06-29 RX ADMIN — ACETAMINOPHEN 650 MG: 325 TABLET, FILM COATED ORAL at 16:32

## 2021-06-29 RX ADMIN — DOCUSATE SODIUM 100 MG: 100 CAPSULE, LIQUID FILLED ORAL at 21:05

## 2021-06-29 RX ADMIN — LEVETIRACETAM 1250 MG: 750 TABLET, FILM COATED ORAL at 21:05

## 2021-06-29 RX ADMIN — FLUOXETINE HYDROCHLORIDE 60 MG: 20 CAPSULE ORAL at 09:08

## 2021-06-29 RX ADMIN — ONDANSETRON 4 MG: 4 TABLET, ORALLY DISINTEGRATING ORAL at 12:47

## 2021-06-29 RX ADMIN — AMLODIPINE BESYLATE 5 MG: 5 TABLET ORAL at 09:15

## 2021-06-29 RX ADMIN — RIVAROXABAN 15 MG: 15 TABLET, FILM COATED ORAL at 18:09

## 2021-06-29 RX ADMIN — DOCUSATE SODIUM 100 MG: 100 CAPSULE, LIQUID FILLED ORAL at 09:08

## 2021-06-29 RX ADMIN — ACETAMINOPHEN 650 MG: 325 TABLET, FILM COATED ORAL at 21:05

## 2021-06-29 NOTE — PLAN OF CARE
Patient sleeping most of the night, awake with complained of headache and no sign of respiratory distress noted, has pure external cath in place and no BM noted. PRN Tylenol given at 06:55, patient alert, baseline tremors noted. Call light is within reach, continue to monitor.      Patient's most recent vital signs are:     Vital signs:  BP: 114/69  Temp: 96.9  HR: 84  RR: 16  SpO2: 92 %     Patient does not have new respiratory symptoms.  Patient does not have new sore throat.  Patient does not have a fever greater than 99.5.

## 2021-06-29 NOTE — PLAN OF CARE
"Discharge Planner Post-Acute Rehab PT:     Discharge Plan: home with daughter's assist vs home to senior apartment and 24/7 supervision with hired in help or placement     Precautions: fall, hx of recent seizures, swallowing     Current Status:  Bed Mobility: variable cga to MAX A, Mod assist to scoot hips  Transfer: cga to min A stand pivot with FWW, difficulty with anterior WS coming to stand, also WB through forefoot and has posterior lean  Gait: 200 feet with FWW Gerard and w/c follow for safety  Stairs: 6 stairs, 2 rails, Gerard  Balance: cga to occasional min A when fatigued.     Assessment:  PT: Pt continues to reports \"not feeling the best\" following her fall, is willing to get up in chair and engage in exercise in gym. In general, small movement appear to require increased time and effort, particularly with transfers d/t posterior lean.       Other Barriers to Discharge (DME, Family Training, etc): functional tolerance, medical complexity, functional status, neuro involvement  "

## 2021-06-29 NOTE — PLAN OF CARE
Patient is alert and oriented. Able to make needs known. VSS on RA.  Assist of 1 & walker for transfer A1 with bed mobility. Regular diet, appettie good, ate over 75%. Uses purewick.   LBM 6/28.  Pain comfortably manageable with PRN tylenol. Denies any cough, chest pain, SOB, dificulty breathing, lightheadedness or dizziness.   Denies any chills of fever. Bed alram active for safety. Call light in reach.     Patient's most recent vital signs are:     Vital signs:  BP: 114/69  Temp: 96.9  HR: 84  RR: 16  SpO2: 92 %     Patient does not have new respiratory symptoms.  Patient does not have new sore throat.  Patient does not have a fever greater than 99.5.

## 2021-06-29 NOTE — PROGRESS NOTES
"Internal Medicine Progress Note      Assessment and plan:  Medicine following up on fall 6/27 and holding anticoagulation. See detailed progress note 6/28 for complete details.    Mechanical fall, posterior scalp hematoma: Occurred 6/27 when getting up to water plants. Brief LOC. CT negative for acute process. Xarelto held 6/27 and 6/28. Situation d/w Dr. Baker who agreed with holding Xarelto and resuming 6/29 if ongoing stabilization. Hgb stable. Neuro exam reassuring 6/29  - Resume Xarelto  - Repeat CBC with Thursday labs   - Patient's daughter, La Nena, update and agrees with plan    Objective:  /68 (BP Location: Right arm)   Pulse 93   Temp 97.6  F (36.4  C) (Oral)   Resp 16   Ht 1.6 m (5' 2.99\")   Wt 68.9 kg (151 lb 12.8 oz)   SpO2 90%   BMI 26.90 kg/m      Vitals signs reviewed and noted    GENERAL: Alert and oriented x3  HEENT: Anicteric sclera. MMM  RESPIRATORY: Effort normal on room air  NEUROLOGICAL: No focal deficits. CN II-XII intact. EOMI, PERRLA. No peripheral field deficits noted. Normal and equal strength in the BUE and BLE. Normal gross sensation. Normal coordination of movement with finger-to-nose bilaterally   EXTREMITIES: No peripheral edema. Intact bilateral pedal pulses  SKIN: No jaundice, no rash    Pertinent labs and procedures were reviewed     Subjective:   Feeling slightly better today although headache persists in the back of the head. Denies further falls or LOC. No AMS. No focal neuro changes. No visual changes. Appetite stable. Had a good therapy session with PT this am.     Wendy Hunter PA-C  Internal Medicine  [phone]      "

## 2021-06-29 NOTE — TELEPHONE ENCOUNTER
"Patient/ daughter La Nena mentioned questions on Bactim dosing to Dr. Spann at last visit. Per MAR review, currently receiving sulfamethoxazole-trimethoprim (BACTRIM) 400-80 MG per tablet 1 tablet daily in TCU.     Reviewed guidelines for PJP ppx with Dr. Baker and [facility]. Per pharmacy, can either do daily OR DS 3x/week. More commonly see the 3x/wk. Per Dr. Baker,   \"Ok to remain on Bactrim for now.   ALC is a little low at 0.4.   Off steroids, which is good.   Can continue to monitor labs and then discontinue when ALC is 0.5 or >.   Thanks,   Stephanie Baker MD   Neuro-oncology   6/29/2021\"    Writer updated La Nena on above guidelines for PJP ppx/Dr. Baker's recommendations. La Nena reports understanding. No other questions or concerns at this time.     Louise Jerry, BSN, RN, PHN, OCN  Oncology Care Coordinator  [facility]      "

## 2021-06-29 NOTE — PLAN OF CARE
Discharge Planner Post-Acute Rehab OT:      Discharge Plan: SW working on placement with family for LTC facility.      Precautions: falls     Current Status:  ADLs: CGA-Min A sit <> stand with FWW, CGA-Min A ambulation with FWW and w/c follow d/t fatigue, Mod A LB dressing from EOB but min A when reclined in bed, SBA-Min A UB dressing, SBA bed mobility when given extra time.   IADLs: NT  Vision/Cognition: Pt oriented with short term memory deficits present.  Pt reports some difficulty reading as well     Assessment: Pt with increased activity tolerance/mood/energy today versus previous session with writer. Pt agreeable to OOB/OOR activity. Pt incontinent of B&B requiring brief change from supine prior to activity. Pt required increased assist with bed mobility and EOB sitting balance from previous week after fall event. Pt continues to report pain in backside of head. Pt required CGA-Mod A for sitting balance today on EOB for approx 5 minutes. Pt agreeable to sitting up in recliner at end of session.      Other Barriers to Discharge (DME, Family Training, etc): Safety, family training.

## 2021-06-29 NOTE — PLAN OF CARE
Patient's most recent vital signs are:     Vital signs:  BP: 113/68  Temp: 97.6  HR: 93  RR: 16  SpO2: 90 %     Patient does not have new respiratory symptoms.  Patient does not have new sore throat.  Patient does not have a fever greater than 99.5.    Large hematoma back of head. C/O headache which is relieved with tylenol. Participating in therapies. Affect seems more flat recently, yet is pleasant and talkative with staff. Good appetite. Sitting in recliner this afternoon. Alarm activated.

## 2021-06-30 ENCOUNTER — APPOINTMENT (OUTPATIENT)
Dept: PHYSICAL THERAPY | Facility: SKILLED NURSING FACILITY | Age: 76
End: 2021-06-30
Payer: MEDICARE

## 2021-06-30 ENCOUNTER — APPOINTMENT (OUTPATIENT)
Dept: SPEECH THERAPY | Facility: SKILLED NURSING FACILITY | Age: 76
End: 2021-06-30
Payer: MEDICARE

## 2021-06-30 ENCOUNTER — APPOINTMENT (OUTPATIENT)
Dept: OCCUPATIONAL THERAPY | Facility: SKILLED NURSING FACILITY | Age: 76
End: 2021-06-30
Payer: MEDICARE

## 2021-06-30 PROCEDURE — 250N000013 HC RX MED GY IP 250 OP 250 PS 637: Performed by: PHYSICIAN ASSISTANT

## 2021-06-30 PROCEDURE — 97530 THERAPEUTIC ACTIVITIES: CPT | Mod: GP

## 2021-06-30 PROCEDURE — 120N000009 HC R&B SNF

## 2021-06-30 PROCEDURE — 92526 ORAL FUNCTION THERAPY: CPT | Mod: GN | Performed by: SPEECH-LANGUAGE PATHOLOGIST

## 2021-06-30 PROCEDURE — 97535 SELF CARE MNGMENT TRAINING: CPT | Mod: GO

## 2021-06-30 RX ADMIN — DOCUSATE SODIUM 100 MG: 100 CAPSULE, LIQUID FILLED ORAL at 08:05

## 2021-06-30 RX ADMIN — LEVETIRACETAM 1250 MG: 750 TABLET, FILM COATED ORAL at 08:05

## 2021-06-30 RX ADMIN — BUSPIRONE HYDROCHLORIDE 30 MG: 15 TABLET ORAL at 21:39

## 2021-06-30 RX ADMIN — FLUOXETINE HYDROCHLORIDE 60 MG: 20 CAPSULE ORAL at 08:05

## 2021-06-30 RX ADMIN — LINACLOTIDE 290 MCG: 145 CAPSULE, GELATIN COATED ORAL at 06:28

## 2021-06-30 RX ADMIN — RIVAROXABAN 15 MG: 15 TABLET, FILM COATED ORAL at 17:06

## 2021-06-30 RX ADMIN — RIVAROXABAN 15 MG: 15 TABLET, FILM COATED ORAL at 08:06

## 2021-06-30 RX ADMIN — FUROSEMIDE 40 MG: 40 TABLET ORAL at 08:05

## 2021-06-30 RX ADMIN — ACETAMINOPHEN 650 MG: 325 TABLET, FILM COATED ORAL at 17:06

## 2021-06-30 RX ADMIN — BUSPIRONE HYDROCHLORIDE 30 MG: 15 TABLET ORAL at 08:04

## 2021-06-30 RX ADMIN — PANTOPRAZOLE SODIUM 40 MG: 40 TABLET, DELAYED RELEASE ORAL at 17:06

## 2021-06-30 RX ADMIN — ACETAMINOPHEN 650 MG: 325 TABLET, FILM COATED ORAL at 09:44

## 2021-06-30 RX ADMIN — AMLODIPINE BESYLATE 5 MG: 5 TABLET ORAL at 08:05

## 2021-06-30 RX ADMIN — MELATONIN TAB 3 MG 3 MG: 3 TAB at 22:49

## 2021-06-30 RX ADMIN — QUETIAPINE FUMARATE 25 MG: 25 TABLET ORAL at 21:39

## 2021-06-30 RX ADMIN — LEVETIRACETAM 1250 MG: 750 TABLET, FILM COATED ORAL at 21:39

## 2021-06-30 RX ADMIN — SULFAMETHOXAZOLE AND TRIMETHOPRIM 1 TABLET: 400; 80 TABLET ORAL at 08:05

## 2021-06-30 RX ADMIN — PANTOPRAZOLE SODIUM 40 MG: 40 TABLET, DELAYED RELEASE ORAL at 06:28

## 2021-06-30 RX ADMIN — CALCIUM POLYCARBOPHIL 625 MG: 625 TABLET, FILM COATED ORAL at 08:05

## 2021-06-30 NOTE — PLAN OF CARE
Pt alert and oriented, able to make needs known.  VSS on RA. A1 with walker. Refuagar diet ate about 30%. Purewick on. LBM 6/30. Hematoma on back of head. Tylenol given x 2. Call light in reach.        Patient's most recent vital signs are:     Vital signs:  BP: 114/73  Temp: 97.8  HR: 95  RR: 16  SpO2: 93 %     Patient does not have new respiratory symptoms.  Patient does not have new sore throat.  Patient does not have a fever greater than 99.5.

## 2021-06-30 NOTE — PROGRESS NOTES
Brief Medicine Note    Medicine assessed patient after resuming Xarelto on 6/29. Patient reports she is doing well this morning. Still has some tenderness and soreness to her posterior scalp where hematoma is, but overall improving. No headache. No paresthesias or focal weakness. No signs of bleeding.     On exam, patient is A&Ox3, pleasant, NAD. No focal neurological deficits on exam, baseline R arm pronator drift present. CN III-XII grossly intact. Posterior occipital hematoma is stable without increasing size. Ecchymosis present. No increasing fluctuance.     A:P  # Mechanical fall, posterior scalp hematoma:  Occurred 6/27 when getting up to water plants. Brief LOC. CT Head and C-spine negative for acute process. Xarelto held 6/27 and 6/28. Situation d/w Dr. Baker who agreed with holding Xarelto and resuming 6/29 if ongoing stabilization. Hgb stable. Neuro exam reassuring 6/29 and resumed Xarelto. Stable today.   - OK to continue Xarelto (resumed on 6/29)  - Follow-up on Hgb in AM with routine labs   - Discussed with Dr. Baker, Oncology on 6/29 who is working to get patient scheduled with OP Hematology for consultation regarding risks/benefits regarding anticoagulation type, appreciate assistance   - Please notify medicine with any new headache, paresthesias, weakness or increasing hematoma size    Rabia López PA-C  Hospitalist Service  Pager [phone]

## 2021-06-30 NOTE — PLAN OF CARE
Patient's most recent vital signs are:     Vital signs:  BP: 117/61  Temp: 97.6  HR: 86  RR: 18  SpO2: 91 %     Patient does not have new respiratory symptoms.  Patient does not have new sore throat.  Patient does not have a fever greater than 99.5.    Hematoma back of head still purple in color, no increased swelling noted. No noted change in level of consciousness. States she still has a slight headache but much improved from the last couple of days. Tylenol given with relief. Seen by SLP for swallow and diet advanced to regular with thin liquids and ok to use straws. Dressing changed on right shin. Area healing. Pleasant.

## 2021-06-30 NOTE — PLAN OF CARE
Patient night was quiet, sleeping during rounds, no complained of pain this sift and denies SOB. Pure wick in use, no BM this shift, continue plan of care.      Patient's most recent vital signs are:     Vital signs:  BP: 117/61  Temp: 97.6  HR: 86  RR: 18  SpO2: 91 %     Patient does nothave new respiratory symptoms.  Patient does not have new sore throat.  Patient does not have a fever greater than 99.5.

## 2021-06-30 NOTE — PLAN OF CARE
Discharge Planner Post-Acute Rehab OT:      Discharge Plan: SW working on placement with family for LTC facility.      Precautions: falls     Current Status:  ADLs: CGA-Min A sit <> stand with FWW, CGA-Min A ambulation with FWW and w/c follow d/t fatigue, Mod A LB dressing from EOB but min A when reclined in bed, SBA-Min A UB dressing, SBA bed mobility when given extra time.   IADLs: NT  Vision/Cognition: Pt oriented with short term memory deficits present.  Pt reports some difficulty reading as well     Assessment:OT treatment focused on  bed mobility w/ min A, sit to stand w/ cga to min A w/ FWW ,x3, pt donned pullover shirt w/ set up in supported sitting, LB drg w/ min A , grooming w/ set up, oral cares set up , engaged in conversation and sense of humor, declined need to use toilet, pt josemanuel therapy well . Cont w/ OT session.       Other Barriers to Discharge (DME, Family Training, etc): Safety, family training.

## 2021-06-30 NOTE — PLAN OF CARE
"Discharge Planner Post-Acute Rehab SLP:      Discharge Plan: tbd     Precautions: Dysphagia     Current Status:  Communication: Min difficulties with conversational tasks (word finding) and able to follow directions without difficulties.  Cognition: mild deficits at least since GBM resection 2/2021, had been seen prior admissions/OP, probable \"plateau\" as abilities appear unchanged, likely functional for needs.   Swallow: Dysphagia diet 3 and thin liquids per video swallow completed 6/11.     Assessment: Patient completed PO trials of IDDSI 7 (regular) texture solids (chicken quesadilla, fresh fruit cup) and thin liquids via single cup sips without overt s/s of aspiration observed. Patient oral phase WFL with adequate bolus acceptance, mastication, and clearance. Pharyngeal phase appears WFL at bedside. Patient followed compensatory swallow strategies of small bites and sips independently. Patient completed PO trials of thin liquids via straw single cup sips x7 without overt s/s of aspiration observed. Upgrade patient to regular texture solids diet. Continue thin liquids and now allowed to use straws for single sips.     Other Barriers to Discharge (Family Training, etc): tbd  "

## 2021-06-30 NOTE — PROGRESS NOTES
SW discussed placement plan with pt this morning. Pt does not want to pursue return to ILF and thinks LTC would be the best option for her care and safety. Pt stated that she would make this desire known to her adult children, who have conflicting perspectives on placement options. SW offered to support pt in these conversations if she desired.     SW called [facility] at pt's daughter's request; no LTC beds are available at this time.     SW pursuing placement at facilities in [facility] per pt's request. [facility] sent referrals to:    [facility]    Details available in case management tab. SW will continue to remain available for patient and family support, discharge planning, other resources and support.    Frederic SORTO, KRIS  TCU   Phone: [phone]

## 2021-07-01 ENCOUNTER — APPOINTMENT (OUTPATIENT)
Dept: OCCUPATIONAL THERAPY | Facility: SKILLED NURSING FACILITY | Age: 76
End: 2021-07-01
Payer: MEDICARE

## 2021-07-01 ENCOUNTER — APPOINTMENT (OUTPATIENT)
Dept: PHYSICAL THERAPY | Facility: SKILLED NURSING FACILITY | Age: 76
End: 2021-07-01
Payer: MEDICARE

## 2021-07-01 LAB
ANION GAP SERPL CALCULATED.3IONS-SCNC: 5 MMOL/L (ref 3–14)
BASOPHILS # BLD AUTO: 0 10E9/L (ref 0–0.2)
BASOPHILS NFR BLD AUTO: 0.5 %
BUN SERPL-MCNC: 13 MG/DL (ref 7–30)
CALCIUM SERPL-MCNC: 8.8 MG/DL (ref 8.5–10.1)
CHLORIDE SERPL-SCNC: 105 MMOL/L (ref 94–109)
CO2 SERPL-SCNC: 26 MMOL/L (ref 20–32)
CREAT SERPL-MCNC: 0.51 MG/DL (ref 0.52–1.04)
DIFFERENTIAL METHOD BLD: ABNORMAL
EOSINOPHIL # BLD AUTO: 0.2 10E9/L (ref 0–0.7)
EOSINOPHIL NFR BLD AUTO: 4.5 %
ERYTHROCYTE [DISTWIDTH] IN BLOOD BY AUTOMATED COUNT: 14.7 % (ref 10–15)
GFR SERPL CREATININE-BSD FRML MDRD: >90 ML/MIN/{1.73_M2}
GLUCOSE SERPL-MCNC: 97 MG/DL (ref 70–99)
HCT VFR BLD AUTO: 34.2 % (ref 35–47)
HGB BLD-MCNC: 11.1 G/DL (ref 11.7–15.7)
IMM GRANULOCYTES # BLD: 0 10E9/L (ref 0–0.4)
IMM GRANULOCYTES NFR BLD: 0.7 %
LYMPHOCYTES # BLD AUTO: 0.4 10E9/L (ref 0.8–5.3)
LYMPHOCYTES NFR BLD AUTO: 9 %
MCH RBC QN AUTO: 30.1 PG (ref 26.5–33)
MCHC RBC AUTO-ENTMCNC: 32.5 G/DL (ref 31.5–36.5)
MCV RBC AUTO: 93 FL (ref 78–100)
MONOCYTES # BLD AUTO: 0.4 10E9/L (ref 0–1.3)
MONOCYTES NFR BLD AUTO: 10.2 %
NEUTROPHILS # BLD AUTO: 3 10E9/L (ref 1.6–8.3)
NEUTROPHILS NFR BLD AUTO: 75.1 %
NRBC # BLD AUTO: 0 10*3/UL
NRBC BLD AUTO-RTO: 0 /100
PLATELET # BLD AUTO: 300 10E9/L (ref 150–450)
POTASSIUM SERPL-SCNC: 3.1 MMOL/L (ref 3.4–5.3)
POTASSIUM SERPL-SCNC: 3.6 MMOL/L (ref 3.4–5.3)
RBC # BLD AUTO: 3.69 10E12/L (ref 3.8–5.2)
SODIUM SERPL-SCNC: 136 MMOL/L (ref 133–144)
WBC # BLD AUTO: 4 10E9/L (ref 4–11)

## 2021-07-01 PROCEDURE — 97110 THERAPEUTIC EXERCISES: CPT | Mod: GP

## 2021-07-01 PROCEDURE — 250N000013 HC RX MED GY IP 250 OP 250 PS 637: Performed by: PHYSICIAN ASSISTANT

## 2021-07-01 PROCEDURE — 36415 COLL VENOUS BLD VENIPUNCTURE: CPT | Performed by: PHYSICIAN ASSISTANT

## 2021-07-01 PROCEDURE — 84132 ASSAY OF SERUM POTASSIUM: CPT | Performed by: INTERNAL MEDICINE

## 2021-07-01 PROCEDURE — 97535 SELF CARE MNGMENT TRAINING: CPT | Mod: GO

## 2021-07-01 PROCEDURE — 120N000009 HC R&B SNF

## 2021-07-01 PROCEDURE — 80048 BASIC METABOLIC PNL TOTAL CA: CPT | Performed by: PHYSICIAN ASSISTANT

## 2021-07-01 PROCEDURE — 250N000013 HC RX MED GY IP 250 OP 250 PS 637: Performed by: INTERNAL MEDICINE

## 2021-07-01 PROCEDURE — 85025 COMPLETE CBC W/AUTO DIFF WBC: CPT | Performed by: PHYSICIAN ASSISTANT

## 2021-07-01 PROCEDURE — 36415 COLL VENOUS BLD VENIPUNCTURE: CPT | Performed by: INTERNAL MEDICINE

## 2021-07-01 RX ORDER — POTASSIUM CHLORIDE 1.5 G/1.58G
40 POWDER, FOR SOLUTION ORAL ONCE
Status: COMPLETED | OUTPATIENT
Start: 2021-07-01 | End: 2021-07-01

## 2021-07-01 RX ADMIN — RIVAROXABAN 15 MG: 15 TABLET, FILM COATED ORAL at 17:53

## 2021-07-01 RX ADMIN — DOCUSATE SODIUM 100 MG: 100 CAPSULE, LIQUID FILLED ORAL at 21:50

## 2021-07-01 RX ADMIN — BUSPIRONE HYDROCHLORIDE 30 MG: 15 TABLET ORAL at 08:25

## 2021-07-01 RX ADMIN — PANTOPRAZOLE SODIUM 40 MG: 40 TABLET, DELAYED RELEASE ORAL at 16:46

## 2021-07-01 RX ADMIN — LINACLOTIDE 290 MCG: 145 CAPSULE, GELATIN COATED ORAL at 06:56

## 2021-07-01 RX ADMIN — SULFAMETHOXAZOLE AND TRIMETHOPRIM 1 TABLET: 400; 80 TABLET ORAL at 08:25

## 2021-07-01 RX ADMIN — PANTOPRAZOLE SODIUM 40 MG: 40 TABLET, DELAYED RELEASE ORAL at 06:56

## 2021-07-01 RX ADMIN — POTASSIUM CHLORIDE 40 MEQ: 1.5 POWDER, FOR SOLUTION ORAL at 12:51

## 2021-07-01 RX ADMIN — FLUOXETINE HYDROCHLORIDE 60 MG: 20 CAPSULE ORAL at 08:25

## 2021-07-01 RX ADMIN — ACETAMINOPHEN 650 MG: 325 TABLET, FILM COATED ORAL at 08:34

## 2021-07-01 RX ADMIN — AMLODIPINE BESYLATE 5 MG: 5 TABLET ORAL at 08:26

## 2021-07-01 RX ADMIN — FUROSEMIDE 40 MG: 40 TABLET ORAL at 08:26

## 2021-07-01 RX ADMIN — RIVAROXABAN 15 MG: 15 TABLET, FILM COATED ORAL at 08:26

## 2021-07-01 RX ADMIN — LEVETIRACETAM 1250 MG: 750 TABLET, FILM COATED ORAL at 08:25

## 2021-07-01 RX ADMIN — SENNOSIDES 2 TABLET: 8.6 TABLET ORAL at 21:50

## 2021-07-01 RX ADMIN — LEVETIRACETAM 1250 MG: 750 TABLET, FILM COATED ORAL at 21:50

## 2021-07-01 RX ADMIN — ACETAMINOPHEN 650 MG: 325 TABLET, FILM COATED ORAL at 21:55

## 2021-07-01 RX ADMIN — BUSPIRONE HYDROCHLORIDE 30 MG: 15 TABLET ORAL at 21:51

## 2021-07-01 RX ADMIN — CALCIUM POLYCARBOPHIL 625 MG: 625 TABLET, FILM COATED ORAL at 08:25

## 2021-07-01 RX ADMIN — QUETIAPINE FUMARATE 25 MG: 25 TABLET ORAL at 21:51

## 2021-07-01 RX ADMIN — DOCUSATE SODIUM 100 MG: 100 CAPSULE, LIQUID FILLED ORAL at 08:26

## 2021-07-01 ASSESSMENT — MIFFLIN-ST. JEOR: SCORE: 1138.95

## 2021-07-01 NOTE — PLAN OF CARE
Discharge Planner Post-Acute Rehab PT:     Discharge Plan: Pt requesting to pursue LTC   Precautions: fall, hx of recent seizures, swallowing     Current Status:  Bed Mobility: variable cga to MAX A, Mod assist to scoot hips  Transfer: cga to min A stand pivot with FWW, difficulty with anterior WS coming to stand, also WB through forefoot and has posterior lean  Gait: 200 feet with FWW Gerard and w/c follow for safety  Stairs: 6 stairs, 2 rails, Gerard  Balance: cga to occasional min A when fatigued.     Assessment:  Pt declined OOB activity this am reporting increased fatigue and sleepiness this am from not sleeping well last night. Agreeable to supine ex. Declined getting up in chair at end of session for breakfast.    Other Barriers to Discharge (DME, Family Training, etc): functional tolerance, medical complexity, functional status, neuro involvement

## 2021-07-01 NOTE — PLAN OF CARE
Discharge Planner Post-Acute Rehab OT:      Discharge Plan: SW working on placement with family for LTC facility.      Precautions: falls     Current Status:  ADLs: CGA-Min A sit <> stand with FWW, CGA-Min A ambulation with FWW and w/c follow d/t fatigue, Mod A LB dressing from EOB but min A when reclined in bed, SBA-Min A UB dressing, SBA bed mobility when given extra time.   IADLs: NT  Vision/Cognition: Pt oriented with short term memory deficits present.  Pt reports some difficulty reading as well     Assessment: Pt able to ambulate with FWW x75' today yet continuous cuing for maintaining straight line and navigating around obstacles with Min A. Pt able to don socks from upright supported in bed with SBA and setup. Consistant cuing for hand placement with STS throughout session. Pt tolerating 9 minutes standing while completing game of memory to promote increased BLE strengthening/balance for ADL performance. Pt took one seated rest break after 3 minutes. No cuing required for rules of game.     Other Barriers to Discharge (DME, Family Training, etc): Safety, family training.

## 2021-07-01 NOTE — PLAN OF CARE
Pt is alert and disoriented to time, calling for needs, bed alarm on for safety, using purewick, sleeping throughout the night, no sign or report of dizziness, chest pain, SOB, fever, chills, n/v, or new numbness and tingling, hematoma on posterior cranium due to previous fall, no further care concerns at this time continue with POC.

## 2021-07-01 NOTE — PROGRESS NOTES
SW received multiple calls from pt's friends and family members today; I am concerned that multiple interpersonal conflicts are a stressor for Olga. SW is following pt's stated plan which is to pursue long-term care placement.    SW discussed pt's situation with charge nurses during shift change. In pursuit of patient-centered care, I recommend check-ins with Olga about who should be her designated visitors on a daily basis so that friends and family members do not create more conflict or stress. Pt's son Sudheer is flying up from [place] on 7/5.    SW pursuing placement at facilities in [facility] per pt's request. SW sent referrals to:     [facility] - can accept  [facility] - under review  [facility] -under review  [facility] - declined, no private rooms at this time (private room necessary for quarantine due to incomplete COVID vaccination)  [facility] - can accept  [facility] - declined: facility policy is to have pts on chemo in private rooms and there are no private rooms available at this time  [facility] -under review  [facility] - declined: no long-term beds available  [facility] - under review     Details available in case management tab.     SW will provide full update to Olga on 7/2 and will only share details with family members according to her desires.     Frederic SORTO, SW  TCU   Phone: [phone]

## 2021-07-01 NOTE — PLAN OF CARE
Alert and able to make needs known. Using call light appropriately. VSS on RA. A1 using walker. Using purewick, NO BM this shift. Hematoma back of head, no change in size. melatonin given at HS per pt request. Bed alrm active. Call light in reach.        Patient's most recent vital signs are:     Vital signs:  BP: 129/76  Temp: 96.4  HR: 97  RR: 16  SpO2: 92 %     Patient does not have new respiratory symptoms.  Patient does not have new sore throat.  Patient does not have a fever greater than 99.5.

## 2021-07-01 NOTE — PLAN OF CARE
Patient's most recent vital signs are:     Vital signs:  BP: 124/75  Temp: 98.1  HR: 91  RR: 16  SpO2: 91 %     Patient does not have new respiratory symptoms.  Patient does not have new sore throat.  Patient does not have a fever greater than 99.5.    Alert and oriented and verbalizes her needs. Participating in therapies. Hematoma back of head. Tylenol given this morning for c/o headache. K+ level-3.1 today. Replacement given and repeat K+ level ordered for 1700. Friend here to visit. Pleasant.

## 2021-07-02 ENCOUNTER — APPOINTMENT (OUTPATIENT)
Dept: OCCUPATIONAL THERAPY | Facility: SKILLED NURSING FACILITY | Age: 76
End: 2021-07-02
Payer: MEDICARE

## 2021-07-02 ENCOUNTER — APPOINTMENT (OUTPATIENT)
Dept: PHYSICAL THERAPY | Facility: SKILLED NURSING FACILITY | Age: 76
End: 2021-07-02
Payer: MEDICARE

## 2021-07-02 LAB
MAGNESIUM SERPL-MCNC: 1.8 MG/DL (ref 1.6–2.3)
POTASSIUM SERPL-SCNC: 3.2 MMOL/L (ref 3.4–5.3)

## 2021-07-02 PROCEDURE — 83735 ASSAY OF MAGNESIUM: CPT | Performed by: INTERNAL MEDICINE

## 2021-07-02 PROCEDURE — 120N000009 HC R&B SNF

## 2021-07-02 PROCEDURE — 97116 GAIT TRAINING THERAPY: CPT | Mod: GP

## 2021-07-02 PROCEDURE — 250N000011 HC RX IP 250 OP 636: Performed by: PHYSICIAN ASSISTANT

## 2021-07-02 PROCEDURE — 99308 SBSQ NF CARE LOW MDM 20: CPT | Performed by: PHYSICIAN ASSISTANT

## 2021-07-02 PROCEDURE — 250N000013 HC RX MED GY IP 250 OP 250 PS 637: Performed by: PHYSICIAN ASSISTANT

## 2021-07-02 PROCEDURE — 36415 COLL VENOUS BLD VENIPUNCTURE: CPT | Performed by: INTERNAL MEDICINE

## 2021-07-02 PROCEDURE — 97535 SELF CARE MNGMENT TRAINING: CPT | Mod: GO

## 2021-07-02 PROCEDURE — 250N000013 HC RX MED GY IP 250 OP 250 PS 637: Performed by: INTERNAL MEDICINE

## 2021-07-02 PROCEDURE — 97110 THERAPEUTIC EXERCISES: CPT | Mod: GP

## 2021-07-02 PROCEDURE — 97530 THERAPEUTIC ACTIVITIES: CPT | Mod: GP

## 2021-07-02 PROCEDURE — 84132 ASSAY OF SERUM POTASSIUM: CPT | Performed by: INTERNAL MEDICINE

## 2021-07-02 RX ORDER — SENNOSIDES 8.6 MG
2 TABLET ORAL
Status: DISCONTINUED | OUTPATIENT
Start: 2021-07-02 | End: 2021-07-15 | Stop reason: HOSPADM

## 2021-07-02 RX ORDER — DOCUSATE SODIUM 100 MG/1
100 CAPSULE, LIQUID FILLED ORAL 2 TIMES DAILY PRN
Status: DISCONTINUED | OUTPATIENT
Start: 2021-07-02 | End: 2021-07-15 | Stop reason: HOSPADM

## 2021-07-02 RX ORDER — POTASSIUM CHLORIDE 750 MG/1
40 TABLET, EXTENDED RELEASE ORAL ONCE
Status: COMPLETED | OUTPATIENT
Start: 2021-07-02 | End: 2021-07-02

## 2021-07-02 RX ORDER — POTASSIUM CHLORIDE 1.5 G/1.58G
20 POWDER, FOR SOLUTION ORAL DAILY
Status: DISCONTINUED | OUTPATIENT
Start: 2021-07-03 | End: 2021-07-11

## 2021-07-02 RX ADMIN — PANTOPRAZOLE SODIUM 40 MG: 40 TABLET, DELAYED RELEASE ORAL at 06:23

## 2021-07-02 RX ADMIN — LEVETIRACETAM 1250 MG: 750 TABLET, FILM COATED ORAL at 21:21

## 2021-07-02 RX ADMIN — BUSPIRONE HYDROCHLORIDE 30 MG: 15 TABLET ORAL at 21:21

## 2021-07-02 RX ADMIN — SULFAMETHOXAZOLE AND TRIMETHOPRIM 1 TABLET: 400; 80 TABLET ORAL at 09:55

## 2021-07-02 RX ADMIN — ONDANSETRON 4 MG: 4 TABLET, ORALLY DISINTEGRATING ORAL at 08:51

## 2021-07-02 RX ADMIN — BUSPIRONE HYDROCHLORIDE 30 MG: 15 TABLET ORAL at 09:55

## 2021-07-02 RX ADMIN — LEVETIRACETAM 1250 MG: 750 TABLET, FILM COATED ORAL at 09:55

## 2021-07-02 RX ADMIN — CALCIUM POLYCARBOPHIL 625 MG: 625 TABLET, FILM COATED ORAL at 09:56

## 2021-07-02 RX ADMIN — AMLODIPINE BESYLATE 5 MG: 5 TABLET ORAL at 09:56

## 2021-07-02 RX ADMIN — FLUOXETINE HYDROCHLORIDE 60 MG: 20 CAPSULE ORAL at 09:56

## 2021-07-02 RX ADMIN — PANTOPRAZOLE SODIUM 40 MG: 40 TABLET, DELAYED RELEASE ORAL at 17:10

## 2021-07-02 RX ADMIN — RIVAROXABAN 15 MG: 15 TABLET, FILM COATED ORAL at 09:56

## 2021-07-02 RX ADMIN — ACETAMINOPHEN 650 MG: 325 TABLET, FILM COATED ORAL at 21:22

## 2021-07-02 RX ADMIN — RIVAROXABAN 15 MG: 15 TABLET, FILM COATED ORAL at 17:52

## 2021-07-02 RX ADMIN — QUETIAPINE FUMARATE 25 MG: 25 TABLET ORAL at 21:22

## 2021-07-02 RX ADMIN — POTASSIUM CHLORIDE 40 MEQ: 750 TABLET, EXTENDED RELEASE ORAL at 14:13

## 2021-07-02 RX ADMIN — FUROSEMIDE 40 MG: 40 TABLET ORAL at 09:56

## 2021-07-02 RX ADMIN — LINACLOTIDE 290 MCG: 145 CAPSULE, GELATIN COATED ORAL at 06:23

## 2021-07-02 NOTE — PLAN OF CARE
Discharge Planner Post-Acute Rehab PT:     Discharge Plan: Pt requesting to pursue LTC   Precautions: fall, hx of recent seizures, swallowing     Current Status:  Bed Mobility: variable cga to MAX A, Mod assist to scoot hips  Transfer: cga to min A stand pivot with FWW, difficulty with anterior WS coming to stand, also WB through forefoot and has posterior lean  Gait: 200 feet with FWW Gerard and w/c follow for safety  Stairs: 6 stairs, 2 rails, Gerard  Balance: cga to occasional min A when fatigued.     Assessment:  Improvement with functional mobility noted today including with bed mob (sup>sit) and amb (maintaining improved posture). -10 min at start of PT session d/t urinary incontinence soaking through her pad and onto bedding. No purewick in.  Other Barriers to Discharge (DME, Family Training, etc): functional tolerance, medical complexity, functional status, neuro involvement

## 2021-07-02 NOTE — PROGRESS NOTES
[facility]   Internal Medicine Progress Note    Rehab Day # 27    Assessment & Plan: Olga Bailey is a 75 year old woman with a history of glioblastoma multiforme s/p resection (2/23/21), HTN, GERD, asthma, anxiety and depression. She was initially admitted to [facility] from 4/16-5/15 for acute hypoxic respiratory failure 2/2 PJP pneumonia. She was discharged to  TCU but required readmission to [facility] on 5/26 for seizure activity. During her hospitalization her Keppra dose was increased, steroids were started, and she remained seizure free. She was transferred back to  TCU on 6/5 for ongoing rehabilitation.      #Glioblastoma Multiforme s/p Resection (2/23/21).  #Seizures.  Follows with Dr. Baker, Oncology as well as Dr. Spann, Radiation Oncology. Completed 15/15 sessions of radiation on 5/27, last saw rad onc 6/24 with plan to follow up as needed. Had breakthrough seizure 5/26 prompting inpatient admission. She was started on dexamethasone taper (completed 6/9), and Keppra dose was increased to 1250 mg BID. On 6/16, there was concern for possible recurrent seizure like activity as health psychologist reported witnessing the patient have 3 episodes where her eyes closed and she licked her lips during their 10 minute conversation. This had not been witnessed by staff prior to this. Discussed with neurology and patient had routine EEG on 6/16 without epileptiform discharges or seizures but with some mild abnormal slowing of background activity. During this period of time, medicine noticed patient being more sedated, therefore doxepin discontinued 6/15 and Seroquel 25 mg BID discontinued with evening dose decreased from 50 to 25 mg. Now mentation much improved. Suspect sedation was culprit. 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 Was seen by Dr. Baker on 6/22 with plans to start chemotherapy - completed cycle 1 of  Temodar 6/24-6/28. MRI Brain 6/22 w/ interval increase in size of enhancing parenchymal nodule just superior to left parietal lobe resection cavity, most c/f disease recurrence/progression.   - Seizure precautions.  - Continue Keppra 1250 mg BID.  - Next f/u w/ Dr. Baker is 7/27.   - Remains on Bactrim prophylaxis despite being off steroids. Per oncology telephone encounter in chart on 6/29 can stop Bactrim once ALC consistently > 0.5.     #BLE DVT s/p IVC filter (4/16/21).  #History of Right Retroperitoneal Hematoma (4/14/21).  US 3/29 revealed BLE DVTs. CT negative for PE. On 4/14 developed spontaneous retroperitoneal bleed requiring 4 units PRBCs. IVC filter placed 4/16. Transitioned from Lovenox to Xarelto on 6/23 per Dr. Baker.   - Continue Xarelto 15 mg BID through 7/14, then start 20 mg every evening on 7/15.   - Has been referred to hematology by Dr. Baker for consideration of potential future IVC filter removal.     #Recent Mechanical Fall.   #Posterior Scalp Hematoma.  #Possible Concussion.   Patient had a fall on 6/27 d/t loss of balance. Reported brief LOC. CT Head and C-spine negative for acute process. Xarelto was held 6/27-6/28 and then resumed. Is reporting increased nausea since the fall, but also just did course of chemotherapy and has been requiring potassium pills which can contribute to nausea. Denies dizziness or headache.   - Continue to monitor symptoms and appearance of hematoma.     #MDD.  #PARIS.  Follows with psychiatry and psychology as outpatient, and health psych following here. Was started on Seroquel 25 mg BID + 50 mg at bedtime and doxepin 3 mg at bedtime in 4/2021 for anxiety/insomnia, which helped, but given concern for daytime sedation discontinued doxepin and decreased Seroquel to just 25 mg HS. Mood stable.   - Continue Buspar, Prozac, Seroquel.   - Health Psychology following weekly at [facility] - patient declined today d/t feeling poorly.      #HTN. BP controlled.   - Continue home  "amlodipine and Lasix.     #Hypokalemia. Requiring frequent KCl replacement per protocol. Suspect d/t diuretic and loose stools.   - Replace per RN protocol.   - Schedule 20 mEq KCl daily for now.   - Stop scheduled docusate and Senna w/ frequent loose stools.   - Add on Mg check.     CODE: full   DVT: Xarelto  Diet: regular  Indications for Psychotropic Medications: Buspar, Prozac, Seroquel at lowest effective doses for MDD and PARIS  Disposition: PT/OT through 7/9, then looking at LTC placement (referrals have been sent)     Nia Heredia PA-C  Hospitalist Service  Pager: [phone]  ________________________________________________________________    Subjective & Interval History:  Chief complaint of nausea. Comes and goes throughout the day without clear trigger. No vomiting. No abdominal pain. Having frequent loose stools and cannot remember when to hold the stool softeners - would like to stop these for now. No headache or dizziness. Tolerating diet. Tolerating therapies.      Last 24 hour care team notes reviewed.   ROS: 4 point ROS (including Respiratory, CV, GI and ) was performed and negative unless otherwise noted in HPI.     Medications: Reviewed in EPIC.    Physical Exam:    Blood pressure 115/66, pulse 80, temperature 97.1  F (36.2  C), temperature source Oral, resp. rate 18, height 1.6 m (5' 2.99\"), weight 67.5 kg (148 lb 12.8 oz), SpO2 93 %.    GENERAL: Alert and oriented x 3. Sitting up in bed, appears comfortable. Pleasant and conversant.   HEENT: Anicteric sclera. Mucous membranes moist. Ongoing posterior scalp hematoma w/ fading ecchymosis.   CV: RRR. S1, S2. No murmurs appreciated.   RESPIRATORY: Effort normal on room air. Lungs CTAB with no wheezing, rales, rhonchi.   GI: Abdomen soft and non distended, bowel sounds present. No tenderness, rebound, guarding.   NEUROLOGICAL: Moves all extremities. RUE pronator drift.   EXTREMITIES: No peripheral edema.   SKIN: No jaundice. No rashes. "

## 2021-07-02 NOTE — PLAN OF CARE
VSS. Alert and oriented. Able to communicate needs. A-1 with transfer and toileting. +BM soft loose today. Stool softener held. K+ level 3.2 today, provider is aware, new order for daily potassium. Potassium 40meq one time dose given, no need to recheck after 4 hrs per protocol per Nia the provider, recheck s. K+ tate instead. Bed alarm on. Mepilex to RLE CDI.  Will continue plan of care.      Patient's most recent vital signs are:     Vital signs:  BP: 115/66  Temp: 97.1  HR: 80  RR: 18  SpO2: 93 %     Patient does not have new respiratory symptoms.  Patient does not have new sore throat.  Patient does not have a fever greater than 99.5.

## 2021-07-02 NOTE — CONSULTS
Health Psychology                  Clinic    Department of Medicine  Bina Florence, Ph.D., L.P. [phone]                          [facility]  [facility] Parul Grider, Ph.D.,  L.P. [phone]                 3rd Floor  [place] [address]   Olya Barrientos, Ph.D., L.P. [phone]    [address] Rena Del Castillo, Ph.D., L.P. [phone]            [place] [address]          Moose Marie, Ph.D., A.B.P.P., L.P. [phone]      Maribel Grier, Ph.D., L.P. [phone]      Inpatient Health Psychology Consultation*    DOS: 7/02/2021    Brief conversation with Ms Bailey when I arrived for a check in visit.   She tells me that she is feeling unwell, dizzy and nauseous.  In fact, has been feeling unwell since she hit her head during a fall on 6/27. Has been having on and off bouts of dizziness and nausea. Did not report these symptoms to any staff or provider until today. Feeling better since receiving Zofran earlier this morning. Did work with PT this morning. Would like to simply rest now.  Medical providers notified of these symptoms and will see Ms Bailey today to do further assessment.  Will follow Ms Bailey later today or next week, depending on her availability.    Bina Florence, PhD, LP        *In accordance with the Rules of the [place] [employer], it is noted that psychological descriptions and scientific procedures underlying psychological evaluations have limitations.  Absolute predictions cannot be made based on information in this report.

## 2021-07-02 NOTE — PLAN OF CARE
Pt is and oriented. Uses call light appropriately and able to make  Needs known. Managed headache with PRN tylenol x 1. Denies shortness of breath, headache, nausea or chest pain. Hematoma at back of head. Had good appetite and encouraging fluid. Seizure precaution in place. Potassium 3.6 now. Call light is in reach.       Patient's most recent vital signs are:     Vital signs:  BP: 113/70  Temp: 97.4  HR: 92  RR: 16  SpO2: 93 %     Patient does not have new respiratory symptoms.  Patient does not have new sore throat.  Patient does not have a fever greater than 99.5.

## 2021-07-02 NOTE — PLAN OF CARE
Patient night was quiet and she appeared to be asleep during safety rounds. Denies pain and SOB, pure wick in use, no BM this shift. Continue current plan of care.      Patient's most recent vital signs are:     Vital signs:  BP: 121/66  Temp: 97.1  HR: 80  RR: 18  SpO2: 93 %     Patient does not have new respiratory symptoms.  Patient does not have new sore throat.  Patient does not have a fever greater than 99.5.

## 2021-07-02 NOTE — PLAN OF CARE
Discharge Planner Post-Acute Rehab OT:      Discharge Plan: SW working on placement with family for LTC facility.      Precautions: falls     Current Status:  ADLs: CGA-Min A sit <> stand with FWW, CGA-Min A ambulation with FWW and w/c follow d/t fatigue, Mod A LB dressing from EOB but min A when reclined in bed, SBA-Min A UB dressing, SBA bed mobility when given extra time.   IADLs: NT  Vision/Cognition: Pt oriented with short term memory deficits present.  Pt reports some difficulty reading as well     Assessment:  Th had to reapproach pt for PM therapy as in AM she was extremely nauseated while lying in bed.  PM pt is feeling much better.  Focus of OT was bed mob and transfers.  Pt able to get self up to EOB with HOB raised, use of bedrail and cue to wt shift to the R.  STS transfers from bed and chair were CGA.  Th had pt take a few steps forward and back and each time pt needed verbal cues to fix midline posture, back up keeping base of support forward, feel the chair on the back of legs before sitting as well as hand placement before standing or sitting (push up from chr or reach back before sitting).  Performance improved with cues but pt needed min to mod A at times as she leaned to the left or backwards or had narrow base of support.  Good effort from pt, progress with this is slow due to stroke sx with decrease motor planning, weakness and decrease memory/slow processing to apply techniques.  Con't OT per POC.      Other Barriers to Discharge (DME, Family Training, etc): Safety, family training.

## 2021-07-03 ENCOUNTER — APPOINTMENT (OUTPATIENT)
Dept: OCCUPATIONAL THERAPY | Facility: SKILLED NURSING FACILITY | Age: 76
End: 2021-07-03
Payer: MEDICARE

## 2021-07-03 ENCOUNTER — APPOINTMENT (OUTPATIENT)
Dept: PHYSICAL THERAPY | Facility: SKILLED NURSING FACILITY | Age: 76
End: 2021-07-03
Payer: MEDICARE

## 2021-07-03 LAB — POTASSIUM SERPL-SCNC: 3.3 MMOL/L (ref 3.4–5.3)

## 2021-07-03 PROCEDURE — 250N000013 HC RX MED GY IP 250 OP 250 PS 637: Performed by: PHYSICIAN ASSISTANT

## 2021-07-03 PROCEDURE — 36415 COLL VENOUS BLD VENIPUNCTURE: CPT | Performed by: PHYSICIAN ASSISTANT

## 2021-07-03 PROCEDURE — 97110 THERAPEUTIC EXERCISES: CPT | Mod: GP

## 2021-07-03 PROCEDURE — 97116 GAIT TRAINING THERAPY: CPT | Mod: GP

## 2021-07-03 PROCEDURE — 250N000013 HC RX MED GY IP 250 OP 250 PS 637: Performed by: INTERNAL MEDICINE

## 2021-07-03 PROCEDURE — 97535 SELF CARE MNGMENT TRAINING: CPT | Mod: GO

## 2021-07-03 PROCEDURE — 120N000009 HC R&B SNF

## 2021-07-03 PROCEDURE — 84132 ASSAY OF SERUM POTASSIUM: CPT | Performed by: PHYSICIAN ASSISTANT

## 2021-07-03 PROCEDURE — 97530 THERAPEUTIC ACTIVITIES: CPT | Mod: GP

## 2021-07-03 RX ORDER — POTASSIUM CHLORIDE 750 MG/1
40 TABLET, EXTENDED RELEASE ORAL ONCE
Status: COMPLETED | OUTPATIENT
Start: 2021-07-03 | End: 2021-07-03

## 2021-07-03 RX ADMIN — FUROSEMIDE 40 MG: 40 TABLET ORAL at 08:33

## 2021-07-03 RX ADMIN — BUSPIRONE HYDROCHLORIDE 30 MG: 15 TABLET ORAL at 21:41

## 2021-07-03 RX ADMIN — LEVETIRACETAM 1250 MG: 750 TABLET, FILM COATED ORAL at 08:31

## 2021-07-03 RX ADMIN — SULFAMETHOXAZOLE AND TRIMETHOPRIM 1 TABLET: 400; 80 TABLET ORAL at 08:32

## 2021-07-03 RX ADMIN — RIVAROXABAN 15 MG: 15 TABLET, FILM COATED ORAL at 18:37

## 2021-07-03 RX ADMIN — AMLODIPINE BESYLATE 5 MG: 5 TABLET ORAL at 08:33

## 2021-07-03 RX ADMIN — POTASSIUM CHLORIDE 20 MEQ: 1.5 FOR SOLUTION ORAL at 08:31

## 2021-07-03 RX ADMIN — QUETIAPINE FUMARATE 25 MG: 25 TABLET ORAL at 21:41

## 2021-07-03 RX ADMIN — PANTOPRAZOLE SODIUM 40 MG: 40 TABLET, DELAYED RELEASE ORAL at 06:15

## 2021-07-03 RX ADMIN — RIVAROXABAN 15 MG: 15 TABLET, FILM COATED ORAL at 08:32

## 2021-07-03 RX ADMIN — PANTOPRAZOLE SODIUM 40 MG: 40 TABLET, DELAYED RELEASE ORAL at 16:34

## 2021-07-03 RX ADMIN — LINACLOTIDE 290 MCG: 145 CAPSULE, GELATIN COATED ORAL at 06:15

## 2021-07-03 RX ADMIN — POTASSIUM CHLORIDE 40 MEQ: 750 TABLET, EXTENDED RELEASE ORAL at 10:18

## 2021-07-03 RX ADMIN — FLUOXETINE HYDROCHLORIDE 60 MG: 20 CAPSULE ORAL at 08:31

## 2021-07-03 RX ADMIN — BUSPIRONE HYDROCHLORIDE 30 MG: 15 TABLET ORAL at 08:32

## 2021-07-03 RX ADMIN — LEVETIRACETAM 1250 MG: 750 TABLET, FILM COATED ORAL at 21:40

## 2021-07-03 RX ADMIN — ACETAMINOPHEN 650 MG: 325 TABLET, FILM COATED ORAL at 21:40

## 2021-07-03 NOTE — PLAN OF CARE
Pt is and oriented. VSS. Uses call light appropriately and able to make  Needs known. Denies shortness of breath, nausea or chest pain. Hematoma at back of head, there is no change in size. Dressing to RLE changed. Had good appetite and encouraging fluid. potassium level 3.2. one time doze was given this morning and there will be a re-draw tomorrow morning 7/3. Seizure precaution in place. Gave PRN tylenol at bedtime for headache. Call light is in reach.       Patient's most recent vital signs are:     Vital signs:  BP: 133/78  Temp: 96.6  HR: 98  RR: 16  SpO2: 90 %     Patient does not have new respiratory symptoms.  Patient does not have new sore throat.  Patient does not have a fever greater than 99.5.

## 2021-07-03 NOTE — PLAN OF CARE
Discharge Planner Post-Acute Rehab OT:      Discharge Plan: SW working on placement with family for LTC facility.      Precautions: falls     Current Status:  ADLs: CGA-Min A sit <> stand with FWW, CGA-Min A ambulation with FWW and w/c follow d/t fatigue, Mod A LB dressing from EOB but min A when reclined in bed, SBA-Min A UB dressing, SBA bed mobility when given extra time.   IADLs: NT  Vision/Cognition: Pt oriented with short term memory deficits present.  Pt reports some difficulty reading as well     Assessment:  OT: pt declined LB drg due to already wearing clean shorts Prior to OT session, pt completed sit to stand transfers w/ close SBA, ub wash set up, ub grooming set up, oral cares w/ set up, ub gown change w/ set up(pt declined to wear shirt,opted for clean gown), pt reports feeling better today than yesterday , impaired balance continues to limit indep w/ adls,  Con't OT per POC.      Other Barriers to Discharge (DME, Family Training, etc): Safety, family training.

## 2021-07-03 NOTE — PLAN OF CARE
Alert and oriented. Patient appeared to be sleeping well in between encounters. No complaints of pain. Purewick on for the overnight and with good output. Lbm 07/03. On seizure precautions. No SOB. Call light in reach. Continue POC.       Patient's most recent vital signs are:     Vital signs:  BP: 133/78  Temp: 96.6  HR: 98  RR: 16  SpO2: 90 %     Patient does not have new respiratory symptoms.  Patient does not have new sore throat.  Patient does not have a fever greater than 99.5.

## 2021-07-03 NOTE — PLAN OF CARE
VSS. Alert and orientedx4. Pleasant and cooperative. On seizure precaution. K+ level 3.3, correction given per protocol. Per Provider Nia, recheck Potassium level tate instead of 4 hrs post correction.  Mepilex to R shin CDI.   Will continue plan of care.      Patient's most recent vital signs are:     Vital signs:  BP: 125/72  Temp: 96.8  HR: 97  RR: 18  SpO2: 95 %     Patient does not have new respiratory symptoms.  Patient does not have new sore throat.  Patient does not have a fever greater than 99.5.

## 2021-07-04 LAB — POTASSIUM SERPL-SCNC: 3.5 MMOL/L (ref 3.4–5.3)

## 2021-07-04 PROCEDURE — 84132 ASSAY OF SERUM POTASSIUM: CPT | Performed by: INTERNAL MEDICINE

## 2021-07-04 PROCEDURE — 250N000013 HC RX MED GY IP 250 OP 250 PS 637: Performed by: PHYSICIAN ASSISTANT

## 2021-07-04 PROCEDURE — 120N000009 HC R&B SNF

## 2021-07-04 PROCEDURE — 36415 COLL VENOUS BLD VENIPUNCTURE: CPT | Performed by: INTERNAL MEDICINE

## 2021-07-04 RX ADMIN — BUSPIRONE HYDROCHLORIDE 30 MG: 15 TABLET ORAL at 21:49

## 2021-07-04 RX ADMIN — QUETIAPINE FUMARATE 25 MG: 25 TABLET ORAL at 21:50

## 2021-07-04 RX ADMIN — BUSPIRONE HYDROCHLORIDE 30 MG: 15 TABLET ORAL at 08:27

## 2021-07-04 RX ADMIN — SULFAMETHOXAZOLE AND TRIMETHOPRIM 1 TABLET: 400; 80 TABLET ORAL at 08:27

## 2021-07-04 RX ADMIN — POTASSIUM CHLORIDE 20 MEQ: 1.5 FOR SOLUTION ORAL at 08:25

## 2021-07-04 RX ADMIN — PANTOPRAZOLE SODIUM 40 MG: 40 TABLET, DELAYED RELEASE ORAL at 17:11

## 2021-07-04 RX ADMIN — LEVETIRACETAM 1250 MG: 750 TABLET, FILM COATED ORAL at 21:49

## 2021-07-04 RX ADMIN — AMLODIPINE BESYLATE 5 MG: 5 TABLET ORAL at 08:26

## 2021-07-04 RX ADMIN — FLUOXETINE HYDROCHLORIDE 60 MG: 20 CAPSULE ORAL at 08:27

## 2021-07-04 RX ADMIN — PANTOPRAZOLE SODIUM 40 MG: 40 TABLET, DELAYED RELEASE ORAL at 06:19

## 2021-07-04 RX ADMIN — FUROSEMIDE 40 MG: 40 TABLET ORAL at 08:27

## 2021-07-04 RX ADMIN — LEVETIRACETAM 1250 MG: 750 TABLET, FILM COATED ORAL at 08:26

## 2021-07-04 RX ADMIN — RIVAROXABAN 15 MG: 15 TABLET, FILM COATED ORAL at 08:27

## 2021-07-04 RX ADMIN — LINACLOTIDE 290 MCG: 145 CAPSULE, GELATIN COATED ORAL at 06:19

## 2021-07-04 RX ADMIN — RIVAROXABAN 15 MG: 15 TABLET, FILM COATED ORAL at 18:55

## 2021-07-04 RX ADMIN — ACETAMINOPHEN 650 MG: 325 TABLET, FILM COATED ORAL at 21:49

## 2021-07-04 NOTE — PLAN OF CARE
K+ level this morning is 3.5. No acute concerns overnight. Patient asleep for majority of the night. No complaints of pain. Purewick on for the overnight and with good output. Lbm 07/03. On seizure precautions, seizure pad in place. No SOB. Call light in reach. Continue POC.       Patient's most recent vital signs are:     Vital signs:  BP: 122/67  Temp: 95.8  HR: 92  RR: 18  SpO2: 94 %     Patient does not have new respiratory symptoms.  Patient does not have new sore throat.  Patient does not have a fever greater than 99.5.

## 2021-07-04 NOTE — PLAN OF CARE
VSS. Alert and orientedx4. Pleasant and cooperative. Able to verbalized needs and used call light appropriately. A-1 with walker and gaitbelt for transfer and toileting using BSC. R shin wound dressing changed. Continent of BM, +BM today. Used purewick.   Bed alarm on for safety. Hematoma to scalp-size the same, bruise is fading.  K level 3.5. Will recheck again tate am.  Will continue plan of care      Patient's most recent vital signs are:     Vital signs:  BP: 129/72  Temp: 97  HR: 78  RR: 18  SpO2: 95 %     Patient does not have new respiratory symptoms.  Patient does not have new sore throat.  Patient does not have a fever greater than 99.5.

## 2021-07-04 NOTE — PLAN OF CARE
Pt is alert and oriented x 4. Able to communicate needs. Managed headache with PRN tylenol at bedtime. Hematoma at back of head. Denies shortness of breath, chest pain, nausea or dizziness. Potassium 3.3, correction given and there will be a re-draw tomorrow morning 7/4. Dressing to right shin CDI. Pt had a good appetite and encouraging fluid. Seizure precaution in place. Bed alarm on for safety. Continue with plan of care.       Patient's most recent vital signs are:     Vital signs:  BP: 122/67  Temp: 95.8  HR: 92  RR: 18  SpO2: 94 %     Patient does not have new respiratory symptoms.  Patient does not have new sore throat.  Patient does not have a fever greater than 99.5.

## 2021-07-05 ENCOUNTER — APPOINTMENT (OUTPATIENT)
Dept: PHYSICAL THERAPY | Facility: SKILLED NURSING FACILITY | Age: 76
End: 2021-07-05
Payer: MEDICARE

## 2021-07-05 ENCOUNTER — HOSPITAL ENCOUNTER (OUTPATIENT)
Facility: CLINIC | Age: 76
Discharge: HOME | End: 2021-07-05
Attending: INTERNAL MEDICINE | Admitting: INTERNAL MEDICINE
Payer: MEDICARE

## 2021-07-05 ENCOUNTER — APPOINTMENT (OUTPATIENT)
Dept: OCCUPATIONAL THERAPY | Facility: SKILLED NURSING FACILITY | Age: 76
End: 2021-07-05
Payer: MEDICARE

## 2021-07-05 LAB
ANION GAP SERPL CALCULATED.3IONS-SCNC: 6 MMOL/L (ref 3–14)
BASOPHILS # BLD AUTO: 0 10E9/L (ref 0–0.2)
BASOPHILS NFR BLD AUTO: 0.4 %
BUN SERPL-MCNC: 9 MG/DL (ref 7–30)
CALCIUM SERPL-MCNC: 9.6 MG/DL (ref 8.5–10.1)
CHLORIDE SERPL-SCNC: 109 MMOL/L (ref 94–109)
CO2 SERPL-SCNC: 24 MMOL/L (ref 20–32)
CREAT SERPL-MCNC: 0.62 MG/DL (ref 0.52–1.04)
DIFFERENTIAL METHOD BLD: ABNORMAL
EOSINOPHIL # BLD AUTO: 0.4 10E9/L (ref 0–0.7)
EOSINOPHIL NFR BLD AUTO: 8.9 %
ERYTHROCYTE [DISTWIDTH] IN BLOOD BY AUTOMATED COUNT: 15.2 % (ref 10–15)
GFR SERPL CREATININE-BSD FRML MDRD: 88 ML/MIN/{1.73_M2}
GLUCOSE SERPL-MCNC: 107 MG/DL (ref 70–99)
HCT VFR BLD AUTO: 37.6 % (ref 35–47)
HGB BLD-MCNC: 12.3 G/DL (ref 11.7–15.7)
IMM GRANULOCYTES # BLD: 0 10E9/L (ref 0–0.4)
IMM GRANULOCYTES NFR BLD: 0.6 %
LYMPHOCYTES # BLD AUTO: 0.4 10E9/L (ref 0.8–5.3)
LYMPHOCYTES NFR BLD AUTO: 9.4 %
MCH RBC QN AUTO: 31 PG (ref 26.5–33)
MCHC RBC AUTO-ENTMCNC: 32.7 G/DL (ref 31.5–36.5)
MCV RBC AUTO: 95 FL (ref 78–100)
MONOCYTES # BLD AUTO: 0.6 10E9/L (ref 0–1.3)
MONOCYTES NFR BLD AUTO: 12.1 %
NEUTROPHILS # BLD AUTO: 3.2 10E9/L (ref 1.6–8.3)
NEUTROPHILS NFR BLD AUTO: 68.6 %
NRBC # BLD AUTO: 0 10*3/UL
NRBC BLD AUTO-RTO: 0 /100
PLATELET # BLD AUTO: 334 10E9/L (ref 150–450)
POTASSIUM SERPL-SCNC: 3.4 MMOL/L (ref 3.4–5.3)
RBC # BLD AUTO: 3.97 10E12/L (ref 3.8–5.2)
SODIUM SERPL-SCNC: 139 MMOL/L (ref 133–144)
WBC # BLD AUTO: 4.7 10E9/L (ref 4–11)

## 2021-07-05 PROCEDURE — 250N000013 HC RX MED GY IP 250 OP 250 PS 637: Performed by: PHYSICIAN ASSISTANT

## 2021-07-05 PROCEDURE — 250N000013 HC RX MED GY IP 250 OP 250 PS 637: Performed by: INTERNAL MEDICINE

## 2021-07-05 PROCEDURE — G0463 HOSPITAL OUTPT CLINIC VISIT: HCPCS

## 2021-07-05 PROCEDURE — 97110 THERAPEUTIC EXERCISES: CPT | Mod: GP

## 2021-07-05 PROCEDURE — 97535 SELF CARE MNGMENT TRAINING: CPT | Mod: GO

## 2021-07-05 PROCEDURE — 120N000009 HC R&B SNF

## 2021-07-05 PROCEDURE — 97110 THERAPEUTIC EXERCISES: CPT | Mod: GO

## 2021-07-05 PROCEDURE — 80048 BASIC METABOLIC PNL TOTAL CA: CPT | Performed by: PHYSICIAN ASSISTANT

## 2021-07-05 PROCEDURE — 85025 COMPLETE CBC W/AUTO DIFF WBC: CPT | Performed by: PHYSICIAN ASSISTANT

## 2021-07-05 PROCEDURE — 36415 COLL VENOUS BLD VENIPUNCTURE: CPT | Performed by: PHYSICIAN ASSISTANT

## 2021-07-05 RX ORDER — POTASSIUM CHLORIDE 750 MG/1
40 TABLET, EXTENDED RELEASE ORAL ONCE
Status: COMPLETED | OUTPATIENT
Start: 2021-07-05 | End: 2021-07-05

## 2021-07-05 RX ADMIN — QUETIAPINE FUMARATE 25 MG: 25 TABLET ORAL at 21:08

## 2021-07-05 RX ADMIN — LEVETIRACETAM 1250 MG: 750 TABLET, FILM COATED ORAL at 21:07

## 2021-07-05 RX ADMIN — PANTOPRAZOLE SODIUM 40 MG: 40 TABLET, DELAYED RELEASE ORAL at 17:13

## 2021-07-05 RX ADMIN — RIVAROXABAN 15 MG: 15 TABLET, FILM COATED ORAL at 08:30

## 2021-07-05 RX ADMIN — FUROSEMIDE 40 MG: 40 TABLET ORAL at 08:33

## 2021-07-05 RX ADMIN — PANTOPRAZOLE SODIUM 40 MG: 40 TABLET, DELAYED RELEASE ORAL at 06:39

## 2021-07-05 RX ADMIN — LEVETIRACETAM 1250 MG: 750 TABLET, FILM COATED ORAL at 08:31

## 2021-07-05 RX ADMIN — LINACLOTIDE 290 MCG: 145 CAPSULE, GELATIN COATED ORAL at 06:39

## 2021-07-05 RX ADMIN — ACETAMINOPHEN 650 MG: 325 TABLET, FILM COATED ORAL at 21:07

## 2021-07-05 RX ADMIN — POTASSIUM CHLORIDE 40 MEQ: 750 TABLET, EXTENDED RELEASE ORAL at 12:21

## 2021-07-05 RX ADMIN — AMLODIPINE BESYLATE 5 MG: 5 TABLET ORAL at 08:33

## 2021-07-05 RX ADMIN — BUSPIRONE HYDROCHLORIDE 30 MG: 15 TABLET ORAL at 08:33

## 2021-07-05 RX ADMIN — RIVAROXABAN 15 MG: 15 TABLET, FILM COATED ORAL at 17:13

## 2021-07-05 RX ADMIN — POTASSIUM CHLORIDE 20 MEQ: 1.5 FOR SOLUTION ORAL at 08:34

## 2021-07-05 RX ADMIN — SULFAMETHOXAZOLE AND TRIMETHOPRIM 1 TABLET: 400; 80 TABLET ORAL at 08:32

## 2021-07-05 RX ADMIN — BUSPIRONE HYDROCHLORIDE 30 MG: 15 TABLET ORAL at 21:07

## 2021-07-05 RX ADMIN — FLUOXETINE HYDROCHLORIDE 60 MG: 20 CAPSULE ORAL at 08:30

## 2021-07-05 NOTE — PLAN OF CARE
Pt is alert and oriented. VSS. Uses call light appropriately and able to make  Needs known. Denies shortness of breath, nausea or chest pain. Hematoma at back of head, there is no change in size. Dressing to RLE CDI. Had good appetite and encouraging fluid. potassium level 3.5. There will be a re-draw tomorrow 7/5. Seizure precaution in place. Requested and received PRN tylenol at bedtime for headache. Had a shower this evening. Call light is in reach.       Patient's most recent vital signs are:     Vital signs:  BP: 115/63  Temp: 97.3  HR: 91  RR: 16  SpO2: 94 %     Patient does not have new respiratory symptoms.  Patient does not have new sore throat.  Patient does not have a fever greater than 99.5.

## 2021-07-05 NOTE — PLAN OF CARE
Discharge Planner Post-Acute Rehab PT:     Discharge Plan: Pt requesting to pursue LTC   Precautions: fall, hx of recent seizures, swallowing     Current Status:  Bed Mobility: variable cga to MAX A, Mod assist to scoot hips  Transfer: cga to min A stand pivot with FWW, difficulty with anterior WS coming to stand, also WB through forefoot and has posterior lean  Gait: 200 feet with FWW Gerard and w/c follow for safety  Stairs: 6 stairs, 2 rails, Gerard  Balance: cga to occasional min A when fatigued.     Assessment:  PTA: Pt sleepy, was not aware of having therapy today. Times not written on her board. Requesting supine activity this am, declining OOB activity at this time. Pt's schedule written on her board for her. Pt Agreeable to supine LE ex.    Other Barriers to Discharge (DME, Family Training, etc): functional tolerance, medical complexity, functional status, neuro involvement

## 2021-07-05 NOTE — CONSULTS
WO Nurse Inpatient Wound Assessment   Reason for consultation: Evaluate and treat right shin skin tear, head hematoma    Assessment  Right shin wound due to Skin Tear  Status: healing and follow up     Posterior head hematoma due to Trauma, fall  Status: follow up assessment, evolving, no open area but raised area    Hematoma on back of head should be assessed regularly for tissue necrosis or fluid collection needing draining.     Treatment Plan  Right shin wound: Every 3 days: wash wound gently with wound cleanser and gauze. Cover open area with Adaptic, hold in place with Mepilex.   Posterior head hematoma: continue to ice. No open areas needing dressings.   Orders Written  Recommended provider order: None, at this time  WO Nurse follow-up plan:weekly  Nursing to notify the Provider(s) and re-consult the WOC Nurse if wound(s) deteriorates or new skin concern.    Patient History  According to provider note(s):  Per Mayra Leija PA-C on 6/27/2021: Olga Bailey is a 75 year old female with a hx of glioblastoma multiforme s/p resection (2/23/21), HTN, GERD, asthma, anxiety and depression. She was initially admitted to [facility] from 4/16-5/15 for acute hypoxic respiratory failure 2/2 PJP pneumonia. She was discharge to  TCU but required readmission to [facility] on 5/26 for seizure activity. During her hospitalization her Keppra dose was increased, steroids were started and she remained seizure free. She was transferred back to  TCU on 6/5 for ongoing rehabilitation.     Objective Data  Containment of urine/stool: Brief    Active Diet Order  Orders Placed This Encounter      Combination Diet Regular Diet      Output:   I/O last 3 completed shifts:  In: 960 [P.O.:960]  Out: 650 [Urine:650]    Risk Assessment:   Sensory Perception: 3-->slightly limited  Moisture: 3-->occasionally moist  Activity: 3-->walks occasionally  Mobility: 3-->slightly limited  Nutrition: 3-->adequate  Friction and Shear: 2-->potential  problem  Gerardo Score: 17                          Labs:   Recent Labs   Lab 07/05/21  0853   HGB 12.3   WBC 4.7       Physical Exam  Areas of skin assessed: focused bilateral lower legs, head    Wound Location:  Right shin    6/28 Right shin    Date of last photo 6/28/2021  Wound History: Skin tear    Wound Base: 90 % dermis and 10% skin flap     Palpation of the wound bed: normal      Drainage: scant     Description of drainage: serosanguinous     Measurements (length x width x depth, in cm)  1.5  x 1.5  x  0.1 cm   Periwound skin: intact but fragile skin      Color: pink      Temperature: normal   Odor: none  Pain: denies    Location: Occiput    6/28 Occiput    S/p fall, large hematoma noted on back of head. Area should be assessed regularly for tissue necrosis or extensive fluid collection needing draining.     Skin is firm and raised approximately 2cm, tender to the touch. No open skin noted on assessment. Demarcated purple area around the affected area.     Interventions  Visual inspection and assessment completed   Wound Care Rationale Promote moist wound healing without tissue dehydration   Wound Care: done per plan of care  Supplies: floor stock  Current off-loading measures: Pillows  Current support surface: Standard  Atmos Air mattress  Education provided to: importance of repositioning and wound progress  Discussed plan of care with Patient and Nurse    Mayda Cheng RN, CWOCN

## 2021-07-05 NOTE — PLAN OF CARE
VSS. Denies pain, CP, or SOB. Pure wick is off, pt said she only wears in at night. Labs done today potassium: 3.4. Potassium protocol initiated, additional 40 meq of potassium given. Provider updated and gave okay to draw next potassium tomorrow 7/6. Pt reports having loose stools, declined fiber-lax this AM. Skin check done no signs of breakdown noted, heels elevated off bed with pillow. Seizure precautions in place. Dressing at rt shin is CDI to be changed tomorrow.     Patient's most recent vital signs are:     Vital signs:  BP: 119/80  Temp: 97.2  HR: 85  RR: 16  SpO2: 96 %     Patient does not have new respiratory symptoms.  Patient does not have new sore throat.  Patient does not have a fever greater than 99.5.

## 2021-07-05 NOTE — PROGRESS NOTES
SW continuing to pursue placement in LTC.     [facility] called SW to notify that they had reconsidered after review and would not accept pt d/t cost of chemo.    SW sent out new referrals today; details in case management tab.     PT/OT notified SW of updated therapy plan. SW notified pt of updated anticipated discharge date of 7/15.    SW will continue to remain available for patient and family support, discharge planning, and access to resources.    Frederic SORTO, [facility]  TCU   Phone: [phone]

## 2021-07-05 NOTE — PROGRESS NOTES
CLINICAL NUTRITION SERVICES - REASSESSMENT NOTE     Nutrition Prescription    RECOMMENDATIONS FOR MDs/PROVIDERS TO ORDER:  None today    Malnutrition Status:    Non-severe malnutrition in the context of acute on chronic illness    Recommendations already ordered by Registered Dietitian (RD):  PRN supplements    Future/Additional Recommendations:  Monitor intakes and wt trends  Snacks per pt request  Scheduled supplements if intakes decline      EVALUATION OF THE PROGRESS TOWARD GOALS   Diet: Regular  Intake: % of most meals per flowsheet. Ordering (on average) 1602kcal and 52g protein per day over the past 3 days     NEW FINDINGS   Diet advanced to regular on 6/30. Pt stated appetite has remained decreased however she has been trying to eat what she can. Diet upgrade has helped increase intakes, able to order items she was previously missing (batres, etc). Ensure was previously ordered, now discontinued as she disliked it. Discussed other available supplements. Pt declined scheduled supplements but agreeable to PRN. Discussed snacks that are available between meals. Pt declined but stated she may be want snacks ordered in the future.     Question initial wt loss of 12# in 4 days. Wt has fluctuated 3# since initial loss. UBW appears to be around 150-160#  3# (2%) wt loss in 1 month, 2# wt loss in 3 months, 15# (9.2%) wt loss in 5 months  07/01/21 : 67.5 kg (148 lb 12.8 oz)  06/19/21 : 68.9 kg (151 lb 12.8 oz)  06/09/21 : 67.2 kg (148 lb 3.2 oz)  06/05/21 : 72.6 kg (160 lb)-admit wt  06/05/21 : 73.7 kg (162 lb 7.7 oz)  06/03/21 : 68.1 kg (150 lb 1.6 oz)  06/01/21 : 68.6 kg (151 lb 4.8 oz)   05/29/21 : 70 kg (154 lb 6.4 oz)   05/19/21 : 69.9 kg (154 lb)  05/15/21 : 70.3 kg (155 lb)   05/10/21 : 67.1 kg (147 lb 14.9 oz)  04/26/21 : 74.4 kg (164 lb 0.4 oz)  04/01/21 : 68.1 kg (150 lb 2.1 oz)  03/23/21 : 70 kg (154 lb 6.4 oz)  03/08/21 : 69.9 kg (154 lb)  02/26/21 : 74.4 kg (164 lb 1.6 oz)  02/15/21 : 74.3 kg (163 lb  12.8 oz)  11/20/15 : 68 kg (150 lb)    MALNUTRITION  % Intake: Decreased intake does not meet criteria  % Weight Loss: Up to 10% in 6 months (non-severe)  Subcutaneous Fat Loss: None observed  Muscle Loss: Temporal, Upper arm (bicep, tricep) and Dorsal hand: mild  Fluid Accumulation/Edema: 1+ (trace) ankles and feet  Malnutrition Diagnosis: Non-severe malnutrition in the context of acute on chronic illness    Previous Goals   Patient to consume % of nutritionally adequate meal trays TID, or the equivalent with supplements/snacks.  Evaluation: Not met    Previous Nutrition Diagnosis  Predicted inadequate protein-energy intake related to variable appetite as evidenced by pt reliant on PO intakes to meet 100% of nutritional needs with potential for variation    Evaluation: No change    CURRENT NUTRITION DIAGNOSIS  Predicted inadequate protein-energy intake related to variable appetite as evidenced by pt reliant on PO intakes to meet 100% of nutritional needs with potential for variation     INTERVENTIONS  Implementation  PRN supplements  Encouraged intakes    Goals  Patient to consume % of nutritionally adequate meal trays TID, or the equivalent with supplements/snacks.    Monitoring/Evaluation  Progress toward goals will be monitored and evaluated per protocol.    Rena Marsh MS, RD, LDN  Unit Pager [phone]

## 2021-07-05 NOTE — PROGRESS NOTES
SPIRITUAL HEALTH SERVICES  SPIRITUAL ASSESSMENT Progress Note  [facility] [place] TCU R423 7/5     REFERRAL SOURCE: Follow Up     Pt mentioned she was feeling better and will likely discharge soon. Pt seemed to be in a good mood. Unit  prayed for pt's recovery and stamina.    PLAN: Will follow up with pt while on unit.    Giovanna Drew  Associate    Pager: [phone]

## 2021-07-05 NOTE — PLAN OF CARE
Discharge Planner Post-Acute Rehab OT:      Discharge Plan: SW working on placement with family for LTC facility.      Precautions: falls     Current Status:  ADLs: CGA-Min A sit <> stand with FWW, CGA-Min A ambulation with FWW and w/c follow d/t fatigue, Mod A LB dressing from EOB but min A when reclined in bed, SBA-Min A UB dressing, SBA bed mobility when given extra time.   IADLs: NT  Vision/Cognition: Pt oriented with short term memory deficits present.  Pt reports some difficulty reading as well     Assessment:OT: basic transfers w/ cga and FWW, ambul in hallway x 130 ft x2 w/ FWW and cga, bed mobility w/ sba, donned shorts w/ min A, donned socks w/ mod A, partic in ue exer , SATS 84% and  after 2nd walk back to room, recovered to 90-91% and  after rest x45-60 sec and cues to use PLB, cont w/ POC      Other Barriers to Discharge (DME, Family Training, etc): Safety, family training.

## 2021-07-05 NOTE — PLAN OF CARE
Patient slept well overnight.  No complaints of pain. Purewick on for the overnight and with good output. Lbm 07/04. On seizure precautions, seizure pads remain in place. No SOB. Call light in reach. Continue POC.       Patient's most recent vital signs are:     Vital signs:  BP: 115/63  Temp: 97.3  HR: 91  RR: 16  SpO2: 94 %     Patient does not have new respiratory symptoms.  Patient does not have new sore throat.  Patient does not have a fever greater than 99.5.

## 2021-07-06 ENCOUNTER — APPOINTMENT (OUTPATIENT)
Dept: PHYSICAL THERAPY | Facility: SKILLED NURSING FACILITY | Age: 76
End: 2021-07-06
Payer: MEDICARE

## 2021-07-06 ENCOUNTER — APPOINTMENT (OUTPATIENT)
Dept: SPEECH THERAPY | Facility: SKILLED NURSING FACILITY | Age: 76
End: 2021-07-06
Payer: MEDICARE

## 2021-07-06 LAB — POTASSIUM SERPL-SCNC: 3.6 MMOL/L (ref 3.4–5.3)

## 2021-07-06 PROCEDURE — 250N000013 HC RX MED GY IP 250 OP 250 PS 637: Performed by: PHYSICIAN ASSISTANT

## 2021-07-06 PROCEDURE — 250N000013 HC RX MED GY IP 250 OP 250 PS 637: Performed by: INTERNAL MEDICINE

## 2021-07-06 PROCEDURE — 97110 THERAPEUTIC EXERCISES: CPT | Mod: GP

## 2021-07-06 PROCEDURE — 120N000009 HC R&B SNF

## 2021-07-06 PROCEDURE — 84132 ASSAY OF SERUM POTASSIUM: CPT | Performed by: PHYSICIAN ASSISTANT

## 2021-07-06 PROCEDURE — 97116 GAIT TRAINING THERAPY: CPT | Mod: GP

## 2021-07-06 PROCEDURE — 92526 ORAL FUNCTION THERAPY: CPT | Mod: GN | Performed by: SPEECH-LANGUAGE PATHOLOGIST

## 2021-07-06 PROCEDURE — 36415 COLL VENOUS BLD VENIPUNCTURE: CPT | Performed by: PHYSICIAN ASSISTANT

## 2021-07-06 RX ORDER — QUETIAPINE FUMARATE 25 MG/1
25 TABLET, FILM COATED ORAL AT BEDTIME
Status: DISCONTINUED | OUTPATIENT
Start: 2021-07-06 | End: 2021-07-15 | Stop reason: HOSPADM

## 2021-07-06 RX ADMIN — BUSPIRONE HYDROCHLORIDE 30 MG: 15 TABLET ORAL at 08:16

## 2021-07-06 RX ADMIN — AMLODIPINE BESYLATE 5 MG: 5 TABLET ORAL at 08:16

## 2021-07-06 RX ADMIN — BUSPIRONE HYDROCHLORIDE 30 MG: 15 TABLET ORAL at 20:30

## 2021-07-06 RX ADMIN — FUROSEMIDE 40 MG: 40 TABLET ORAL at 08:16

## 2021-07-06 RX ADMIN — PANTOPRAZOLE SODIUM 40 MG: 40 TABLET, DELAYED RELEASE ORAL at 17:38

## 2021-07-06 RX ADMIN — LEVETIRACETAM 1250 MG: 750 TABLET, FILM COATED ORAL at 08:16

## 2021-07-06 RX ADMIN — FLUOXETINE HYDROCHLORIDE 60 MG: 20 CAPSULE ORAL at 08:15

## 2021-07-06 RX ADMIN — POTASSIUM CHLORIDE 20 MEQ: 1.5 FOR SOLUTION ORAL at 08:16

## 2021-07-06 RX ADMIN — SULFAMETHOXAZOLE AND TRIMETHOPRIM 1 TABLET: 400; 80 TABLET ORAL at 08:16

## 2021-07-06 RX ADMIN — ACETAMINOPHEN 650 MG: 325 TABLET, FILM COATED ORAL at 21:25

## 2021-07-06 RX ADMIN — QUETIAPINE 25 MG: 25 TABLET ORAL at 21:22

## 2021-07-06 RX ADMIN — PANTOPRAZOLE SODIUM 40 MG: 40 TABLET, DELAYED RELEASE ORAL at 06:19

## 2021-07-06 RX ADMIN — RIVAROXABAN 15 MG: 15 TABLET, FILM COATED ORAL at 08:16

## 2021-07-06 RX ADMIN — LINACLOTIDE 290 MCG: 145 CAPSULE, GELATIN COATED ORAL at 06:19

## 2021-07-06 RX ADMIN — LEVETIRACETAM 1250 MG: 750 TABLET, FILM COATED ORAL at 20:30

## 2021-07-06 RX ADMIN — RIVAROXABAN 15 MG: 15 TABLET, FILM COATED ORAL at 17:38

## 2021-07-06 NOTE — PROGRESS NOTES
07/05/21 0626   Quick Adds   Quick Adds Certification   Type of Visit Occupational Therapy Re-certification   Living Environment   People in home alone   Current Living Arrangements house   Transportation Anticipated agency;family or friend will provide   Living Environment Comments Prior to most recent medical issues pt was living alone in her home fully independent working part-time as an usher at [employer].  With recent health issues pt has lived at her daugher's house and plans to discharge there from this TCU stay.  Pt enjoys long walks and being with family.Plan to d/c to [facility] vs home?, antic pt will need 24 hr supervision due to cognitive deficits and high falls risk   Self-Care   Usual Activity Tolerance good   Current Activity Tolerance moderate   Equipment Currently Used at Home cane, straight;walker, rolling   Activity/Exercise/Self-Care Comment pt previously did not use AD but now uses FWW   Disability/Function   Hearing Difficulty or Deaf no   Wear Glasses or Blind yes   Vision Management glasses   Concentrating, Remembering or Making Decisions Difficulty yes   Difficulty Communicating no   Dressing/Bathing Difficulty no   Toileting issues no   Doing Errands Independently Difficulty (such as shopping) no   Errands Management family assists   Fall history within last six months yes   Change in Functional Status Since Onset of Current Illness/Injury yes   General Information   Onset of Illness/Injury or Date of Surgery 04/26/21   Referring Physician ANA MARÍA thompson   Patient/Family Therapy Goal Statement (OT) none stated   Additional Occupational Profile Info/Pertinent History of Current Problem per chart review:Olga Bailey is a 75 year old female with a history of glioblastoma s/p resection (2/23/21), HTN, GERD, asthma, depression, anxiety. She was initially admitted to [facility] from 4/26 to 5/15 for acute hypoxic respiratory failure 2/2 PJP pneumonia. She was discharged to  TCU but required  re-admission to [facility] on 5/26 for seizure activity. Her Keppra dose was increased, steroids were started, and she has since remained seizure free. She now transfers back to TCU for physical rehabilitation.    Existing Precautions/Restrictions fall   Limitations/Impairments safety/cognitive   General Observations and Info oT: Rue weaker than L, pt deSATS inconsistantly, high falls risk, cognitive deficits interfere w/ pt's indep w/ adls and mobility   Cognitive Status Examination   Orientation Status person;orientation to person, place and time   Memory Deficit   (impaired but improved since initial admit)   Cognitive Status Comments OT: recommend ongoing assessment of cognitive function to assist w/ d/c recommendations   Visual Perception   Visual Acuity glasses   Range of Motion Comprehensive   Comment, General Range of Motion L AROM WNL, Rue sh flex less than full but WFL   Bed Mobility   Comment (Bed Mobility) oT: sba   Transfers   Transfer Comments oT: sba to cga dep on pts fatigue level and ht of surface   Balance   Balance Comments oT: needs cues for posture to improve balance in standing and ambulation   Activities of Daily Living   BADL Assessment/Intervention   (overall sba to min A, variable w/LB drg)   Clinical Impression   Criteria for Skilled Therapeutic Interventions Met (OT) yes   OT Diagnosis decreased indep w/ADLs/;mobility, functioning below baseline   OT Problem List-Impairments impacting ADL problems related to;activity tolerance impaired;balance;cognition;strength   Assessment of Occupational Performance 3-5 Performance Deficits   Identified Performance Deficits transfers, toileting, bathing, dressing   Planned Therapy Interventions (OT) ADL retraining;balance training;cognition;strengthening;transfer training;progressive activity/exercise   Clinical Decision Making Complexity (OT) low complexity   Therapy Frequency (OT) 6x/week   Predicted Duration of Therapy 1 week   Risk & Benefits of therapy  have been explained evaluation/treatment results reviewed;care plan/treatment goals reviewed;risks/benefits reviewed;current/potential barriers reviewed;participants voiced agreement with care plan;participants included;patient   Comment-Clinical Impression OT: pt demo generalized weakness, impaired balance, impaired cognition and decreased activity josemanuel and variable w/ performance w/ adls and mobility , recommend continued OT to address deficits to increase level of indep and provide appropriate recommendations for safety d/c plan   Therapy Certification   Start of Care Date 07/05/21   Certification date from 07/05/21   Certification date to 08/02/21   Medical Diagnosis seizure, s/p GB resection, hypoxic resp failure   Total Evaluation Time (Minutes)   Total Evaluation Time (Minutes) 15

## 2021-07-06 NOTE — PLAN OF CARE
Discharge Planner Post-Acute Rehab PT:     Discharge Plan: Pt requesting to pursue LTC   Precautions: fall, hx of recent seizures, swallowing     Current Status:  Bed Mobility: variable cga to MAX A, Mod assist to scoot hips  Transfer: cga to min A stand pivot with FWW, difficulty with anterior WS coming to stand, also WB through forefoot and has posterior lean  Gait: 200 feet with FWW Gerard and w/c follow for safety  Stairs: 6 stairs, 2 rails, Gerard  Balance: cga to occasional min A when fatigued.     Assessment:  Pt amb 200 ft x1 using ww Gerard, w/c follow. Instructed to sit in w/c when fatigued, writer pulling it along. Several VCs given for correction of posture, fwd gaze, safe use of ww. With each cue, checked in with pt about fatigue status. Pt declined need for seated rest break even tho visibly fatigued and SOB. Pt tends to push self vs self monitoring fatigue levels. O2 at ~88-90% on RA, HR <122 bpm with activity today. Rest breaks taken. -10 min d/t bowel incontinence at start of PT session.    Other Barriers to Discharge (DME, Family Training, etc): functional tolerance, medical complexity, functional status, neuro involvement

## 2021-07-06 NOTE — PLAN OF CARE
"  VS: /68 (BP Location: Right arm)   Pulse 84   Temp 96  F (35.6  C) (Oral)   Resp 16   Ht 1.6 m (5' 2.99\")   Wt 67.5 kg (148 lb 12.8 oz)   SpO2 94%   BMI 26.37 kg/m      O2: No SOB or respiratory distress noted    Output: Voided good using purewick    Last BM: 7/5   Activity: Ambulated and transfer to recliner for dinner using a walker with assist of one.    Skin: Intact and bruised noted on her scalp    Pain: C/o headache and gave tylenol 650mg at bedtime    CMS: Intact no NT   Dressing: N/A   Diet: Regular diet   LDA: N/A   Equipment: Walker    Plan: Continue with plan of care    Additional Info:         Patient's most recent vital signs are:     Vital signs:  BP: 115/68  Temp: 96  HR: 84  RR: 16  SpO2: 94 %     Patient doesn't  have new respiratory symptoms.  Patient doesn't  have new sore throat.  Patient doesn't  have a fever greater than 99.5.         "

## 2021-07-06 NOTE — PROGRESS NOTES
Transitional Care Rounds Note    Date:   7/6/21  Unit Day:   31   Diagnosis:   Final diagnoses:   Physical deconditioning   Glioblastoma -- S/P Surg Resection 2/23/21      Code Status:Full Code     Current mobility: A x 1 w/ walker  Therapy Goal: independent  Discharge Equipment: walker  Discharge services needed: NA - pt discharging to facility.    Purpose:   Daily rounds    Attendance:       Treatment Team:   CAMRON, RNCC, MDS, PT/OT, providers, dieticians, discharge pharm, nursing    Discussion:       Medical Overview and Update:   PA managing nausea - increasing PRNs.      Patient/Family Questions and Concerns:   Pt's family very involved in plan of care. The only people that should be receiving information are: La Nena Bisohp Damon and Alexsandra      Treatment Team Recommendations:   NA    Follow-Up Plan:       Teaching Needs     NA    Conclusion:       Discharging to: [facility] working on placement.          Taryn Camacho RN .................... 7/6/2021 3:23 PM

## 2021-07-06 NOTE — PLAN OF CARE
OT pt cx due to c/o nausea, th offered multiple options for therapy but pt declined all option. If  schedule offers opening later today, th will re-attempt .    -45min

## 2021-07-06 NOTE — PLAN OF CARE
Speech Language Therapy Discharge Summary    Reason for therapy discharge:    All goals and outcomes met, no further needs identified.    Progress towards therapy goal(s). See goals on Care Plan in [facility] electronic health record for goal details.  Goals met    Therapy recommendation(s):    No further therapy is recommended.SLP: pt has been advanced to regular diet, thin fluids.  Pt tolerating without sx aspiration (also had follow up VFSS 6/11/21. Pt ok with straw, recommending small bites and sips.

## 2021-07-06 NOTE — PLAN OF CARE
VS: VSS-denies pain, SOB, chest pain, headache and dizziness.    O2: Sating 99% on RA   Output: Continent x 2 this shift, uses pure-wick at bedtime.    Last BM: 07/05/21   Activity: Assist of 1 with GB and walker    Skin: mutiple bruises all over body present    Pain: Denies    CMS: Intact    Dressing: Right shin, dressing changed.    Diet: Combination Dysphagia/thin liquids and no straw    LDA: none   Equipment: Wheelchair, BSC, walker, GB   Plan: Continue with POC   Additional Info: Alert and oriented x 4, can make needs known. Bed alarm activated for safety. No issues or concern reported this shift. Call light is within reach.          Patient's most recent vital signs are:     Vital signs:  BP: 138/81  Temp: 96.2  HR: 86  RR: 16  SpO2: 99 %     Patient does not have new respiratory symptoms.  Patient does not have new sore throat.  Patient does not have a fever greater than 99.5.

## 2021-07-06 NOTE — PROGRESS NOTES
Outpatient Speech Language Pathology Discharge Note     Patient: Olga Bailey  : 1945    Beginning/End Dates of Reporting Period:  3/10/2021 to 2021    Referring Provider: Dr. Jl Rosenbaum    Therapy Diagnosis: Cognitive-deficits, s/p craniotomy, glioblastoma    Client Self Report: Olga Bailey is a 75 year old y.o.female s/p craniotomy, glioblastoma with cognitive deficits .  Please refer to the initial outpatient speech-language pathology evaluation dated 3/10/21 for further background information.  Patient has been seen for 2 additional therapy sessions since the evaluation care addressing cognitive-linguistic changes as a result of the above dx.  She was last seen for outpatient speech-language therapy at this location on 3/26/21, and was then admitted to hospital for medical care (see chart in Epic for further details of medical history)    Objective Measurements: Patient has not returned to therapy since last appointment on 3/26/21.  All goals will be discontinued, progress towards goals based on last attended therapy session on 3/26/21.       Goals:  Goal Identifier STG 1: Language/Verbal Expression   Goal Description Patient will complete moderate level verbal expression tasks, such as defining, describing, and word generation tasks with 80% accuracy and minimal cues for improved ability to express thoughts and ideas in conversation.   Target Date 21   Date Met      Progress: Goal discontinues.  Progress towards goal = Pt verbalizes understanding of word retrieval strategies and is able to implement during therapy sessions with cues-minimal. Naming items in a category with same first letter = 100% with cues. Without cues = 90%.     Goal Identifier STG 2: Language/Reading Comprehension   Goal Description Patient will identify and implement reading comprehension strategies to complete reading comprehension at the practical to complex level with 90% accuracy for skills needed  for daily living and safety needs.    Target Date 06/08/21   Date Met      Progress: Goal discontinues.  Progress towards goal = Pt able to read 2 sentence paragraphs and answer questions=80%, with cues to go back and double check she improves to 100%. Pt able to read 5-7 sentence paragraph and answer multiple choice questions with 100%.       Goal Identifier STG 3: Language/Written Expression   Goal Description Patient will complete practical written expression tasks such as spelling words, personal information, completing forms with 90% accuracy and minimal assistance for improved written expression needed for daily living and safety needs of communication.    Target Date 06/08/21   Date Met      Progress: Goal discontinues-not addressed as patient only seen for 2 therapy sessions.      Goal Identifier STG 4: Cognition/Memory   Goal Description Patient will identify and implement 1-2 memory strategies to assist recall of new verbal and non-verbal information presented immediately and after a delay of at least 10 min with 80% accuracy and minimal assistance for memory needs for daily living communication situations and safety.   Target Date 06/08/21   Date Met      Progress: Goal discontinues-not addressed as patient only seen for 2 therapy sessions.         Goal Identifier STG 5: Cognition/Reasoning   Goal Description Patient will complete moderate level verbal and nonverbal problem- solving tasks including sequencing, deduction and visual planning/reasoning with 80% accuracy given moderate assistance for improved safety at home and in the community.    Target Date 06/08/21   Date Met      Progress: Goal discontinues-not addressed as patient only seen for 2 therapy sessions.         Goal Identifier STG 6: HEP   Goal Description Patient will complete a home exercise program geared towards the above goals with assistance from family prn.    Target Date 06/08/21   Date Met      Progress: Goal discontinues.  Patient  was able to complete home program with assistance from family.    Progress Toward Goals:    Not assessed this period. Pt only able to attend 2 therapy sessions before change in medical status and hospitalization.    Plan:  Discharge from therapy.    Discharge:    Reason for Discharge: Change in medical status.    Discharge Plan: Please re-consult outpatient speech-language therapy services when appropriate.

## 2021-07-06 NOTE — PLAN OF CARE
No acute issues overnight. Awakened briefly during initial rounds - denied pain, discomfort, CP and SOB and offered no other c/o's or requests @ that time. Purewick intact overnight. Seizure precautions - rails padded. K+ replacement given yesterday - recheck this AM. Bed alarm on with no attempts OOB indep.        Patient's most recent vital signs are:     Vital signs:  BP: 115/68  Temp: 96  HR: 84  RR: 16  SpO2: 94 %     Patient does not have new respiratory symptoms.  Patient does not have new sore throat.  Patient does not have a fever greater than 99.5.

## 2021-07-07 ENCOUNTER — APPOINTMENT (OUTPATIENT)
Dept: PHYSICAL THERAPY | Facility: SKILLED NURSING FACILITY | Age: 76
End: 2021-07-07
Payer: MEDICARE

## 2021-07-07 ENCOUNTER — APPOINTMENT (OUTPATIENT)
Dept: OCCUPATIONAL THERAPY | Facility: SKILLED NURSING FACILITY | Age: 76
End: 2021-07-07
Payer: MEDICARE

## 2021-07-07 LAB — POTASSIUM SERPL-SCNC: 3.8 MMOL/L (ref 3.4–5.3)

## 2021-07-07 PROCEDURE — 36415 COLL VENOUS BLD VENIPUNCTURE: CPT | Performed by: INTERNAL MEDICINE

## 2021-07-07 PROCEDURE — 97112 NEUROMUSCULAR REEDUCATION: CPT | Mod: GP | Performed by: PHYSICAL THERAPIST

## 2021-07-07 PROCEDURE — 250N000013 HC RX MED GY IP 250 OP 250 PS 637: Performed by: INTERNAL MEDICINE

## 2021-07-07 PROCEDURE — 97116 GAIT TRAINING THERAPY: CPT | Mod: GP | Performed by: PHYSICAL THERAPIST

## 2021-07-07 PROCEDURE — 99308 SBSQ NF CARE LOW MDM 20: CPT | Performed by: PHYSICIAN ASSISTANT

## 2021-07-07 PROCEDURE — 84132 ASSAY OF SERUM POTASSIUM: CPT | Performed by: INTERNAL MEDICINE

## 2021-07-07 PROCEDURE — 99207 PR CDG-CODE CATEGORY CHANGED: CPT | Performed by: PHYSICIAN ASSISTANT

## 2021-07-07 PROCEDURE — 97530 THERAPEUTIC ACTIVITIES: CPT | Mod: GO

## 2021-07-07 PROCEDURE — 250N000013 HC RX MED GY IP 250 OP 250 PS 637: Performed by: PHYSICIAN ASSISTANT

## 2021-07-07 PROCEDURE — 97110 THERAPEUTIC EXERCISES: CPT | Mod: GP | Performed by: PHYSICAL THERAPIST

## 2021-07-07 PROCEDURE — 97530 THERAPEUTIC ACTIVITIES: CPT | Mod: GP | Performed by: PHYSICAL THERAPIST

## 2021-07-07 PROCEDURE — 120N000009 HC R&B SNF

## 2021-07-07 RX ADMIN — PANTOPRAZOLE SODIUM 40 MG: 40 TABLET, DELAYED RELEASE ORAL at 06:09

## 2021-07-07 RX ADMIN — LINACLOTIDE 290 MCG: 145 CAPSULE, GELATIN COATED ORAL at 06:09

## 2021-07-07 RX ADMIN — ACETAMINOPHEN 650 MG: 325 TABLET, FILM COATED ORAL at 21:48

## 2021-07-07 RX ADMIN — LEVETIRACETAM 1250 MG: 750 TABLET, FILM COATED ORAL at 21:38

## 2021-07-07 RX ADMIN — FUROSEMIDE 40 MG: 40 TABLET ORAL at 08:56

## 2021-07-07 RX ADMIN — RIVAROXABAN 15 MG: 15 TABLET, FILM COATED ORAL at 18:10

## 2021-07-07 RX ADMIN — QUETIAPINE 25 MG: 25 TABLET ORAL at 21:38

## 2021-07-07 RX ADMIN — LEVETIRACETAM 1250 MG: 750 TABLET, FILM COATED ORAL at 08:55

## 2021-07-07 RX ADMIN — BUSPIRONE HYDROCHLORIDE 30 MG: 15 TABLET ORAL at 21:38

## 2021-07-07 RX ADMIN — FLUOXETINE HYDROCHLORIDE 60 MG: 20 CAPSULE ORAL at 08:54

## 2021-07-07 RX ADMIN — AMLODIPINE BESYLATE 5 MG: 5 TABLET ORAL at 08:55

## 2021-07-07 RX ADMIN — PANTOPRAZOLE SODIUM 40 MG: 40 TABLET, DELAYED RELEASE ORAL at 18:10

## 2021-07-07 RX ADMIN — SULFAMETHOXAZOLE AND TRIMETHOPRIM 1 TABLET: 400; 80 TABLET ORAL at 08:56

## 2021-07-07 RX ADMIN — POTASSIUM CHLORIDE 20 MEQ: 1.5 FOR SOLUTION ORAL at 08:54

## 2021-07-07 RX ADMIN — RIVAROXABAN 15 MG: 15 TABLET, FILM COATED ORAL at 08:54

## 2021-07-07 RX ADMIN — BUSPIRONE HYDROCHLORIDE 30 MG: 15 TABLET ORAL at 08:54

## 2021-07-07 NOTE — PLAN OF CARE
No acute issues overnight. Appears to be sleeping well and has no c/o's or requests as of yet tonight. Purewick in place. LBM = incont.yesterday. Bed alarm on.        Patient's most recent vital signs are:     Vital signs:  BP: 110/74  Temp: 98.3  HR: 103  RR: 16  SpO2: 93 %     Patient does not have new respiratory symptoms.  Patient does not have new sore throat.  Patient does not have a fever greater than 99.5.

## 2021-07-07 NOTE — PLAN OF CARE
Discharge Planner Post-Acute Rehab OT:      Discharge Plan: SW working on placement with family for LTC facility.      Precautions: falls     Current Status:  ADLs: CGA-Min A sit <> stand with FWW, CGA-Min A ambulation with FWW and w/c follow d/t fatigue, Mod A LB dressing from EOB but min A when reclined in bed, SBA-Min A UB dressing, SBA bed mobility when given extra time.   IADLs: NT  Vision/Cognition: Pt oriented with short term memory deficits present.  Pt reports some difficulty reading as well     Assessment:oT: standing x 5 min w/ brodie ue laundry folding tasks w/ cga, pt reported feeling tired in standing and finished session w/ seated actviities to work on activty josemanuel, including brodie ue ball toss/catch /bounce , bean bag toss at target w/ L and Rues x3 ft, >3ft less successful, batted ball w/ 1.5# dowel w/ brodie ues in sitting . Pt josemanuel activity well in sitting and engaged in conversation joking w/ therapist (s) in therapy dept., son  Present to meet therapists and received brief update on pt's therapy performance. Cont w/ POC    Other Barriers to Discharge (DME, Family Training, etc): Safety, family training.

## 2021-07-07 NOTE — PLAN OF CARE
Patient has no new concerns.Denies pain/SOB/chest pain.Right leg shin dressing CDI.Skin has multiple bruises,Assist of 1 with transfers.Pure wick when in bed.LBM 7/7/21 in the commode.VSS.continue with POC.        Patient's most recent vital signs are:     Vital signs:  BP: 112/66  Temp: 97.2  HR: 80  RR: 18  SpO2: 94 %     Patient does not have new respiratory symptoms.  Patient does not have new sore throat.  Patient does not have a fever greater than 99.5.

## 2021-07-07 NOTE — PLAN OF CARE
VS: VSS-denies pain, SOB, chest pain, nausea and dizziness.    O2: Sating 93% on RA   Output: Continent x 2 via BSC. Pure wick in place   Last BM: 07/05/21   Activity: Assist of 1 with GB and walker    Skin: Mutiple bruises all over body present    Pain: Complained intermittent headache, managed with PRN Tylenol x 1.    CMS: Intact    Dressing: Right shin, dressing CDI.    Diet: Combination Dysphagia with thin liquids-fair appetite    LDA: none   Equipment: Wheelchair, BSC, walker, GB   Plan: Continue with POC   Additional Info: Alert and oriented x 4, can make needs known. Bed alarm activated for safety. No issues or concern reported this shift. Call light is within reach.           Patient's most recent vital signs are:     Vital signs:  BP: 110/74  Temp: 98.3  HR: 103  RR: 16  SpO2: 93 %     Patient does not have new respiratory symptoms.  Patient does not have new sore throat.  Patient does not have a fever greater than 99.5.

## 2021-07-07 NOTE — PROGRESS NOTES
Pt has been accepted at     [facility]  [id number] [place] [id number]  Admissions manager Chai Bloom phone: [phone]    SW notified pt's daughter La Nena and son Sudheer of this via email on 7/6 and told them that [facility] was willing to schedule tours.     CAMRON spoke with La Nena and Sudheer separately on the phone today to follow up. La Nena stated that she would look into [facility], but asked if SW could make follow-up call to [facility], which has not contacted me yet to accept or decline.    SW will continue to remain available for patient and family support, discharge planning, and access to resources.    Frederic SORTO, SW  TCU   Phone: [phone]

## 2021-07-07 NOTE — PROGRESS NOTES
Transitional Care Rounds Note    Date:   7/721  Unit Day:   32   Diagnosis:   Final diagnoses:   Physical deconditioning   Glioblastoma -- S/P Surg Resection 2/23/21      Code Status:Full Code     Current mobility: A x 1 w/ walker  Therapy Goal: independent  Discharge Equipment: walker  Discharge services needed: NA - pt discharging to facility.    Purpose:   Daily rounds    Attendance:       Treatment Team:   SW, RNCC, MDS, PT/OT, providers, dieticians, discharge pharm, nursing    Discussion:       Medical Overview and Update:   PA managing nausea - increasing PRNs.      Patient/Family Questions and Concerns:   Pt's family very involved in plan of care. The only people that should be receiving information are: La Nena Bishop, Sudheer and Alexsandra      Treatment Team Recommendations:   NA    Follow-Up Plan:       Teaching Needs     NA    Conclusion:       Discharging to: [facility] [place]. CAMRON working with family to ensure the facility will work for the patient and her family.        Taryn Camacho RN .................... 7/7/2021 1:35 PM

## 2021-07-07 NOTE — PROGRESS NOTES
[facility]itional [facility]    Medicine Progress Note - Hospitalist Service       Date of Admission:  6/5/2021    Assessment & Plan         Assessment & Plan: Olga Bailey is a 75 year old woman with a history of glioblastoma multiforme s/p resection (2/23/21), HTN, GERD, asthma, anxiety and depression. She was initially admitted to [facility] from 4/16-5/15 for acute hypoxic respiratory failure 2/2 PJP pneumonia. She was discharged to  TCU but required readmission to [facility] on 5/26 for seizure activity. During her hospitalization her Keppra dose was increased, steroids were started, and she remained seizure free. She was transferred back to  TCU on 6/5 for ongoing rehabilitation.     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 Baker, Oncology as well as Dr. Spann, Radiation Oncology. Completed 15/15 sessions of radiation on 5/27, last saw rad onc 6/24 with plan to follow up as needed. Had breakthrough seizure 5/26 prompting inpatient admission. She was started on dexamethasone taper (completed 6/9), and Keppra dose was increased to 1250 mg BID. On 6/16, there was concern for possible recurrent seizure like activity as health psychologist reported witnessing the patient have 3 episodes where her eyes closed and she licked her lips during their 10 minute conversation. This had not been witnessed by staff prior to this. Discussed with neurology and patient had routine EEG on 6/16 without epileptiform discharges or seizures but with some mild abnormal slowing of background activity. During this period of time, medicine noticed patient being more sedated, therefore doxepin discontinued 6/15 and Seroquel 25 mg BID discontinued with evening dose decreased from 50 to 25 mg. Now mentation much improved. Suspect sedation was culprit. Has pronator drift to R arm at baseline, and chronically does  not get the year right after her tumor resection. MRI Brain 6/22 w/ interval increase in size of enhancing parenchymal nodule just superior to left parietal lobe resection cavity, most c/f disease recurrence/progression.     - Continue seizure precautions  - Continue Keppra 1250 mg BID  - Follow up with w/ Oncology / Dr. Baker is 7/27/21    Antibiotic prophylaxis  - Remains on Bactrim prophylaxis despite being off steroids. Per oncology telephone encounter in chart on 6/29 can stop Bactrim once ALC consistently > 0.5.     BLE DVTs (3/29/21) s/p IVC filter (4/16/21)  Right Retroperitoneal Hematoma s/p transfusion (4/14/21)  US 3/29/21 revealed BLE DVTs. CT negative for PE. On 4/14 developed spontaneous retroperitoneal bleed requiring 4 units PRBCs.      - Continue Xarelto 15 mg BID through 7/14, then start 20 mg every evening on 7/15.   - Has been referred to hematology by Dr. Baker for consideration of potential future IVC filter removal.      Mechanical Fall with posterior Scalp Hematoma and possible concussion (6/27/21)  Patient had a fall on 6/27 d/t loss of balance. Reported brief LOC. CT Head and C-spine negative for acute process. Xarelto was held 6/27-6/28 and then resumed. Is reporting increased nausea since the fall, but also just did course of chemotherapy and has been requiring potassium pills which can contribute to nausea. Denies dizziness or headache.     - Continue to monitor symptoms and appearance of hematoma.      Major depression with generalized anxiety disorder  Follows with psychiatry and psychology as outpatient, and health psych following here. Was started on Seroquel 25 mg BID + 50 mg at bedtime and doxepin 3 mg at bedtime in 4/2021 for anxiety/insomnia, which helped, but given concern for daytime sedation discontinued doxepin and decreased Seroquel to just 25 mg HS. Mood stable.   - Continue Buspar, Prozac, Seroquel.   - Health Psychology following weekly at [facility] - patient declined today d/t  feeling poorly.     Chronic constipation  -Continue home regimen of Linzess 290 mcg once daily     Hypertension  - No acute issues. Continue home amlodipine 5mg once daily and Lasix 40mg once daily.      Hypokalemia secondary to diuretic use and chemo  -Continue with checking electrolytes MWF and repleting as needed  -Continue potassium 20Meq once daily     Psychiatry medications  -Continue with Seroquel 25mg at bedtime to help with insomnia     Diet: Room Service  Combination Diet Regular Diet  Snacks/Supplements Adult: Other; Any supplement PRN; Between Meals    DVT Prophylaxis: Xarelto and IVC filter  Martino Catheter: Not present  Central Lines: None  Code Status: Full Code    Expected discharge: to [facility] on 7/15/21      TERA Shepherd  Hospitalist Service  [facility]  Securely message with the Vocera Web Console (learn more here)  Text page via [employer] Paging/Directory      Interval History   Ms. Bailey was resting comfortably in bed this morning. She denies any chest pain, shortness of breath, fevers, chills, headaches or vision changes. We reviewed her medications, discharge plan and she had no further questions or concerns.    Data reviewed today: I reviewed all medications, new labs and imaging results over the last 24 hours.     Physical Exam   Vital Signs: Temp: 97.2  F (36.2  C) Temp src: Oral BP: 112/66 Pulse: 80   Resp: 18 SpO2: 94 % O2 Device: None (Room air)    Weight: 148 lbs 12.8 oz  General Appearance: 75 year old female resting comfortably in bed, no acute distress  Respiratory: breathing comfortably on room air, no adventitious sounds to bilateral auscultation  Cardiovascular: regular rate and rhythm, no appreciable murmurs, rubs or gallops  GI: tinkering bowel sounds present, soft, non-tender to palpation throughout, no rebound or guarding  Skin: warm, dry, no open sores, lesions or ulcerations  Psych: alert, oriented to name, date, hospital and recent events,  not able to remember the year    Data   Recent Labs   Lab 07/07/21  0641 07/06/21  0541 07/05/21  0853 07/01/21  0751 07/01/21  0751   WBC  --   --  4.7  --  4.0   HGB  --   --  12.3  --  11.1*   MCV  --   --  95  --  93   PLT  --   --  334  --  300   NA  --   --  139  --  136   POTASSIUM 3.8 3.6 3.4   < > 3.1*   CHLORIDE  --   --  109  --  105   CO2  --   --  24  --  26   BUN  --   --  9  --  13   CR  --   --  0.62  --  0.51*   ANIONGAP  --   --  6  --  5   ESTIVEN  --   --  9.6  --  8.8   GLC  --   --  107*  --  97    < > = values in this interval not displayed.

## 2021-07-07 NOTE — PROGRESS NOTES
07/07/21 1655   Quick Adds   Type of Visit PT Re-certification   Living Environment   People in home alone   Current Living Arrangements house   Home Accessibility stairs to enter home;stairs within home   Transportation Anticipated agency;family or friend will provide   Living Environment Comments Prior to most recent medical issues pt was living alone in her home fully independent working part-time as an usher at [employer].  With recent health issues pt has lived at her gerald's house and plans to discharge there from this TCU stay.  Pt enjoys long walks and being with family.   Pt brianna need TCU due to her longstanding medical issues including Glioblastoma multiform , balance and cognitive issues.    Self-Care   Usual Activity Tolerance good   Current Activity Tolerance moderate   Equipment Currently Used at Home cane, straight;walker, rolling   Activity/Exercise/Self-Care Comment PT: did not use fww  previously , now using fww with A.    Disability/Function   Hearing Difficulty or Deaf no   Wear Glasses or Blind yes   Vision Management glasses    Concentrating, Remembering or Making Decisions Difficulty yes   Difficulty Communicating no   Communication   (occassional difficulty with word finding)   Difficulty Eating/Swallowing no   Eating/Swallowing swallowing liquids   Walking or Climbing Stairs Difficulty no   Dressing/Bathing Difficulty no   Dressing/Bathing bathing difficulty, requires equipment;bathing difficulty, assistance 1 person   Toileting issues no   Toileting Management grab bars.    Toileting Assistance toileting difficulty, assistance 1 person   Doing Errands Independently Difficulty (such as shopping) no   Errands Management family assists.    Fall history within last six months yes   Number of times patient has fallen within last six months 0   Change in Functional Status Since Onset of Current Illness/Injury yes   General Information   Onset of Illness/Injury or Date of Surgery  "06/05/21   Referring Physician Dr Bueno    Patient/Family Therapy Goals Statement (PT) PT: Pt wants to continue working on balance, walking , stairs to get stronger, feel better.    Pertinent History of Current Problem (include personal factors and/or comorbidities that impact the POC) \"76 y/o female admitted on 5/26/2021 for breakthrough seizures secondary to GBM.  Pt had previously been hospitalized 5/11/21 and had seizures at that time.  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  Other PMH includes anxiety/depression, b/l LE DVT 3/29/21, retroperitoneal bleed 4/14/21, HTN.  Pt has her own house and was living independently until the medical issues of this year.  Most recently she has been living with her daughter but does not have 24/7 assist.  Pt was previously on this unit in May of this year.\"  per chart review and MD notes.    Existing Precautions/Restrictions fall;seizures;swallowing   Weight-Bearing Status - LUE weight-bearing as tolerated   Weight-Bearing Status - RUE weight-bearing as tolerated   Weight-Bearing Status - LLE weight-bearing as tolerated   Weight-Bearing Status - RLE weight-bearing as tolerated   General Observations pt is comfortable in bed and agreeable to PT history, exam, and mobility   Cognition   Orientation Status (Cognition) other (see comments)   Cognitive Status Comments PT: Pt with word finding diffiuclties at times, alert and oriented.  Pt with seizure like activity while on TCU.   Needs cues for safety with mobility.    Pain Assessment   Patient Currently in Pain No   Integumentary/Edema   Integumentary/Edema Comments none   Posture    Posture Comments WFL's age appropriate   Range of Motion (ROM)   ROM Comment WFL's for gait and transfers   Strength   Strength Comments LE's grossly 3/5 proximally and 3+ to 4-/5 distally   Bed Mobility   Comment (Bed Mobility) PT: variable depending on level of alertness, fatigue, min to modA at times when tired, and sba, " rail at times. to sit EOB.     Transfers   Transfer Safety Comments PT: Pt is needing cues for safe hand placement and safety with turning completely and safely before sitting down, uses FWW.  Pt fatigues quickly, and more difficulty when tired.    Gait/Stairs (Locomotion)   Comment (Gait/Stairs) PT: Pt amb in PT gym, around obstacles, needing Gerard at times for walker placement , cues for safety, tends to move walker to R, with lean to L,  130 ft x 2 reps, 85 ft x 1  seated rest break between JACOBSEN noted.     Balance   Balance Comments PT; sitting , tendency for leaning trunk to L when tired, and leans L in standing at tiems.  Pt needs cga for standing brief ly, about 15 seconds wihtout UE support.  Pt needs B UEs on walker and Gerard with ambulation. at this time.    Sensory Examination   Sensory Perception Comments PT intact to light touch to LEs.    Coordination   Coordination Comments Grossly intact.    Clinical Impression   Criteria for Skilled Therapeutic Intervention yes, treatment indicated   PT Diagnosis (PT) PT: Pt with impaired mobility, adls due to cognitive deficits/ GBM, debility.    Influenced by the following impairments PT: Pt with impaired activity tolerance,  JACOBSEN with activity, decreased sitting and standing balance, cognitive deficits, debility and generalized LE weakness. Pt has had seizure-like activity while on TCU and variable performance with mobility.    Functional limitations due to impairments Pt with diffiuclty with transfers, ambulation, needs cues and A for safety.    Clinical Presentation Evolving/Changing   Clinical Presentation Rationale neuro involvement, medical complexity; functional status   Clinical Decision Making (Complexity) moderate complexity   Therapy Frequency (PT) 6x/week   Predicted Duration of Therapy Intervention (days/wks) 7-14 days    Planned Therapy Interventions (PT) balance training;bed mobility training;gait training;home exercise program;neuromuscular  re-education;patient/family education;postural re-education;ROM (range of motion);stair training;strengthening;stretching;transfer training;wheelchair management/propulsion training   Anticipated Equipment Needs at Discharge (PT) walker, rolling   Risk & Benefits of therapy have been explained evaluation/treatment results reviewed;care plan/treatment goals reviewed;risks/benefits reviewed;current/potential barriers reviewed;participants voiced agreement with care plan;participants included;patient   Clinical Impression Comments Pt presents with chronic medical issues including neuro involvement and changing functional status; she is approrpriate for skilled PT due to this medical complexity and functional presentation to help her regain as much function as possible and reduce her fall risk.    PT Discharge Planning    PT Discharge Recommendation (DC Rec) home with home care physical therapy   PT Rationale for DC Rec PT: prolonged hospitalization, medical complexity, functional status   PT Brief overview of current status  As above, requires A x 1 for mobility at this time; desaturates to the mid 80's (O2 sat) with short gait distances.     Therapy Certification   Start of care date 06/06/21   Certification date from 07/07/21   Certification date to 08/06/21   Medical Diagnosis Glioblastoma multiform   Skin WDL   Skin WDL X   Skin Color redness blanchable   Skin Temperature warm   Skin Moisture dry,flaky   Skin Elasticity quick return to original state   Skin Integrity/Characteristics bruised   Coping Strategies   Trust Relationship/Rapport care explained

## 2021-07-08 ENCOUNTER — APPOINTMENT (OUTPATIENT)
Dept: PHYSICAL THERAPY | Facility: SKILLED NURSING FACILITY | Age: 76
End: 2021-07-08
Payer: MEDICARE

## 2021-07-08 ENCOUNTER — APPOINTMENT (OUTPATIENT)
Dept: OCCUPATIONAL THERAPY | Facility: SKILLED NURSING FACILITY | Age: 76
End: 2021-07-08
Payer: MEDICARE

## 2021-07-08 LAB
ANION GAP SERPL CALCULATED.3IONS-SCNC: 9 MMOL/L (ref 3–14)
BASOPHILS # BLD AUTO: 0 10E9/L (ref 0–0.2)
BASOPHILS NFR BLD AUTO: 0.4 %
BUN SERPL-MCNC: 10 MG/DL (ref 7–30)
CALCIUM SERPL-MCNC: 9.2 MG/DL (ref 8.5–10.1)
CHLORIDE SERPL-SCNC: 107 MMOL/L (ref 94–109)
CO2 SERPL-SCNC: 22 MMOL/L (ref 20–32)
CREAT SERPL-MCNC: 0.53 MG/DL (ref 0.52–1.04)
DIFFERENTIAL METHOD BLD: ABNORMAL
EOSINOPHIL # BLD AUTO: 0.4 10E9/L (ref 0–0.7)
EOSINOPHIL NFR BLD AUTO: 8 %
ERYTHROCYTE [DISTWIDTH] IN BLOOD BY AUTOMATED COUNT: 14.8 % (ref 10–15)
GFR SERPL CREATININE-BSD FRML MDRD: >90 ML/MIN/{1.73_M2}
GLUCOSE SERPL-MCNC: 97 MG/DL (ref 70–99)
HCT VFR BLD AUTO: 35.9 % (ref 35–47)
HGB BLD-MCNC: 11.7 G/DL (ref 11.7–15.7)
IMM GRANULOCYTES # BLD: 0 10E9/L (ref 0–0.4)
IMM GRANULOCYTES NFR BLD: 0.4 %
LYMPHOCYTES # BLD AUTO: 0.3 10E9/L (ref 0.8–5.3)
LYMPHOCYTES NFR BLD AUTO: 5.6 %
MAGNESIUM SERPL-MCNC: 1.8 MG/DL (ref 1.6–2.3)
MCH RBC QN AUTO: 30.2 PG (ref 26.5–33)
MCHC RBC AUTO-ENTMCNC: 32.6 G/DL (ref 31.5–36.5)
MCV RBC AUTO: 93 FL (ref 78–100)
MONOCYTES # BLD AUTO: 0.6 10E9/L (ref 0–1.3)
MONOCYTES NFR BLD AUTO: 11.7 %
NEUTROPHILS # BLD AUTO: 4.1 10E9/L (ref 1.6–8.3)
NEUTROPHILS NFR BLD AUTO: 73.9 %
NRBC # BLD AUTO: 0 10*3/UL
NRBC BLD AUTO-RTO: 0 /100
PLATELET # BLD AUTO: 210 10E9/L (ref 150–450)
POTASSIUM SERPL-SCNC: 3.4 MMOL/L (ref 3.4–5.3)
POTASSIUM SERPL-SCNC: 3.9 MMOL/L (ref 3.4–5.3)
RBC # BLD AUTO: 3.88 10E12/L (ref 3.8–5.2)
SODIUM SERPL-SCNC: 138 MMOL/L (ref 133–144)
WBC # BLD AUTO: 5.5 10E9/L (ref 4–11)

## 2021-07-08 PROCEDURE — 85025 COMPLETE CBC W/AUTO DIFF WBC: CPT | Performed by: PHYSICIAN ASSISTANT

## 2021-07-08 PROCEDURE — 84132 ASSAY OF SERUM POTASSIUM: CPT | Performed by: PHYSICIAN ASSISTANT

## 2021-07-08 PROCEDURE — 97110 THERAPEUTIC EXERCISES: CPT | Mod: GP

## 2021-07-08 PROCEDURE — 36415 COLL VENOUS BLD VENIPUNCTURE: CPT | Performed by: PHYSICIAN ASSISTANT

## 2021-07-08 PROCEDURE — 83735 ASSAY OF MAGNESIUM: CPT | Performed by: PHYSICIAN ASSISTANT

## 2021-07-08 PROCEDURE — 97116 GAIT TRAINING THERAPY: CPT | Mod: GP

## 2021-07-08 PROCEDURE — 80048 BASIC METABOLIC PNL TOTAL CA: CPT | Performed by: PHYSICIAN ASSISTANT

## 2021-07-08 PROCEDURE — 250N000013 HC RX MED GY IP 250 OP 250 PS 637: Performed by: PHYSICIAN ASSISTANT

## 2021-07-08 PROCEDURE — 97530 THERAPEUTIC ACTIVITIES: CPT | Mod: GO

## 2021-07-08 PROCEDURE — 120N000009 HC R&B SNF

## 2021-07-08 PROCEDURE — 250N000013 HC RX MED GY IP 250 OP 250 PS 637: Performed by: INTERNAL MEDICINE

## 2021-07-08 PROCEDURE — 97530 THERAPEUTIC ACTIVITIES: CPT | Mod: GP

## 2021-07-08 RX ADMIN — ACETAMINOPHEN 650 MG: 325 TABLET, FILM COATED ORAL at 21:45

## 2021-07-08 RX ADMIN — BUSPIRONE HYDROCHLORIDE 30 MG: 15 TABLET ORAL at 11:13

## 2021-07-08 RX ADMIN — PANTOPRAZOLE SODIUM 40 MG: 40 TABLET, DELAYED RELEASE ORAL at 06:07

## 2021-07-08 RX ADMIN — RIVAROXABAN 15 MG: 15 TABLET, FILM COATED ORAL at 19:01

## 2021-07-08 RX ADMIN — BUSPIRONE HYDROCHLORIDE 30 MG: 15 TABLET ORAL at 21:43

## 2021-07-08 RX ADMIN — AMLODIPINE BESYLATE 5 MG: 5 TABLET ORAL at 11:15

## 2021-07-08 RX ADMIN — LINACLOTIDE 290 MCG: 145 CAPSULE, GELATIN COATED ORAL at 06:06

## 2021-07-08 RX ADMIN — POTASSIUM CHLORIDE 20 MEQ: 1.5 FOR SOLUTION ORAL at 11:15

## 2021-07-08 RX ADMIN — SULFAMETHOXAZOLE AND TRIMETHOPRIM 1 TABLET: 400; 80 TABLET ORAL at 11:12

## 2021-07-08 RX ADMIN — QUETIAPINE 25 MG: 25 TABLET ORAL at 21:44

## 2021-07-08 RX ADMIN — LEVETIRACETAM 1250 MG: 750 TABLET, FILM COATED ORAL at 21:43

## 2021-07-08 RX ADMIN — FUROSEMIDE 40 MG: 40 TABLET ORAL at 11:15

## 2021-07-08 RX ADMIN — FLUOXETINE HYDROCHLORIDE 60 MG: 20 CAPSULE ORAL at 11:15

## 2021-07-08 RX ADMIN — LEVETIRACETAM 1250 MG: 750 TABLET, FILM COATED ORAL at 11:14

## 2021-07-08 RX ADMIN — PANTOPRAZOLE SODIUM 40 MG: 40 TABLET, DELAYED RELEASE ORAL at 16:36

## 2021-07-08 NOTE — PLAN OF CARE
"VS: /75 (BP Location: Right arm)   Pulse 98   Temp 97.2  F (36.2  C) (Oral)   Resp 16   Ht 1.6 m (5' 2.99\")   Wt 67.5 kg (148 lb 12.8 oz)   SpO2 95%   BMI 26.37 kg/m     O2: RA   Output: Incontinent, purewick   Last BM: 7/7   Activity: A1  With walker & GB   Skin: Intact   Pain: Tylenol given at HS   CMS: intact   Dressing: None   Diet: Regular   LDA: None   Equipment: Walker, GB, BSC, Recliner chair, Purewick   Plan: Continue with plan of care   Additional Info:            Patient's most recent vital signs are:     Vital signs:  BP: 117/75  Temp: 97.2  HR: 98  RR: 16  SpO2: 95 %     Patient does not have new respiratory symptoms.  Patient does not have new sore throat.  Patient does not have a fever greater than 99.5.         "

## 2021-07-08 NOTE — PROGRESS NOTES
Transitional Care Rounds Note    Date:   7/8/21  Unit Day:   33   Diagnosis:   Final diagnoses:   Physical deconditioning   Glioblastoma -- S/P Surg Resection 2/23/21      Code Status:Full Code     Current mobility: A x 1 w/ walker  Therapy Goal: independent  Discharge Equipment: walker  Discharge services needed: NA - pt discharging to facility.    Purpose:   Daily rounds    Attendance:       Treatment Team:   CAMRON, RNCC, MDS, PT/OT, providers, dieticians, discharge pharm, nursing    Discussion:       Medical Overview and Update:   PA managing nausea - increasing PRNs.      Patient/Family Questions and Concerns:   Pt's family very involved in plan of care. The only people that should be receiving information are: La Nena Bishop Damon and Alexsandra      Treatment Team Recommendations:   NA    Follow-Up Plan:       Teaching Needs     NA    Conclusion:       Discharging to: [facility] in [place]. CAMRON working with family to ensure the facility will work for the patient and her family. See CAMRON note for additional details - family asking to pursue [facility].        Taryn Camacho RN .................... 7/8/2021 1:35 PM

## 2021-07-08 NOTE — PLAN OF CARE
Discharge Planner Post-Acute Rehab PT:     Discharge Plan: Pt requesting to pursue LTC   Precautions: fall, hx of recent seizures, swallowing     Current Status:  Bed Mobility: variable cga to MAX A, Mod assist to scoot hips  Transfer: cga to min A stand pivot with FWW, difficulty with anterior WS coming to stand, also WB through forefoot and has posterior lean  Gait: 200 feet with FWW Gerard and w/c follow for safety  Stairs: 6 stairs, 2 rails, Gerard  Balance: cga to occasional min A when fatigued.     Assessment:   O2 at 85% on RA after 1st set of step ups (x10 reps), HR <122 bpm. ~88-90% on RA, HR <122 bpm with 2nd and 3rd sets. Rest breaks taken. Improved gait path with amb today, PT gym>room. CGA. Cues only for fwd gaze.     Other Barriers to Discharge (DME, Family Training, etc): functional tolerance, medical complexity, functional status, neuro involvement

## 2021-07-08 NOTE — PLAN OF CARE
"VS: /68 (BP Location: Right arm)   Pulse 86   Temp 97.6  F (36.4  C) (Oral)   Resp 16   Ht 1.6 m (5' 2.99\")   Wt 67.5 kg (148 lb 12.8 oz)   SpO2 100%   BMI 26.37 kg/m       O2: RA; denies SOB   Output: PureWick NOC; off during day. Pt calls to use BR   Last BM: 7/7   Activity: Participated in therapies. A-1 GB WW   Skin: KENYON   Pain: denies   CMS/Neuro: No s/s of seizure activity   Dressing:    Diet: Combo Reg; small sips   LDA: none   Equipment: GB, WW, BSC, suction   Plan: Alarms on for safety. Con't POC.    Additional Info:         Patient's most recent vital signs are:     Vital signs:  BP: 120/68  Temp: 97.6  HR: 86  RR: 16  SpO2: 100 %     Patient does not have new respiratory symptoms.  Patient does not have new sore throat.  Patient does not have a fever greater than 99.5.  "

## 2021-07-08 NOTE — PLAN OF CARE
Discharge Planner Post-Acute Rehab OT:      Discharge Plan: SW working on placement with family for LTC facility.      Precautions: falls     Current Status:  ADLs: CGA-Min A sit <> stand with FWW, CGA-Min A ambulation with FWW and w/c follow d/t fatigue, Mod A LB dressing from EOB but min A when reclined in bed, SBA-Min A UB dressing, SBA bed mobility when given extra time.   IADLs: NT  Vision/Cognition: Pt oriented with short term memory deficits present.  Pt reports some difficulty reading as well     Assessment: Pt seen for functional reaching and standing task with cognitive component with use of scrabble to promote increased activity tolerance/standing tolerance, sustained attention and problem solving. Pt completed x4 STS throughout activity with CGA from w/c with use of tabletop for UE support. Pt tolerated 4 intervals of standing 5 minutes x1, and 1 minute x3. Pt standing at tabletop with SBA/CGA. Pt engaged in conversation throughout, laughing, and had good insight on deficits during game. Cont w/ POC    Other Barriers to Discharge (DME, Family Training, etc): Safety, family training.

## 2021-07-08 NOTE — PLAN OF CARE
Pt slept most of the shift. No respiratory distress noted. purewick in place. Seizure precaution in place. Bed alarm on for safety. Continue with plan of care.       Patient's most recent vital signs are:     Vital signs:  BP: 117/75  Temp: 97.2  HR: 98  RR: 16  SpO2: 95 %     Patient does not have new respiratory symptoms.  Patient does not have new sore throat.  Patient does not have a fever greater than 99.5.

## 2021-07-09 ENCOUNTER — HOSPITAL ENCOUNTER (OUTPATIENT)
Facility: CLINIC | Age: 76
Discharge: HOME | End: 2021-07-09
Attending: INTERNAL MEDICINE | Admitting: INTERNAL MEDICINE
Payer: MEDICARE

## 2021-07-09 ENCOUNTER — APPOINTMENT (OUTPATIENT)
Dept: OCCUPATIONAL THERAPY | Facility: SKILLED NURSING FACILITY | Age: 76
End: 2021-07-09
Payer: MEDICARE

## 2021-07-09 ENCOUNTER — APPOINTMENT (OUTPATIENT)
Dept: PHYSICAL THERAPY | Facility: SKILLED NURSING FACILITY | Age: 76
End: 2021-07-09
Payer: MEDICARE

## 2021-07-09 LAB
ANION GAP SERPL CALCULATED.3IONS-SCNC: 5 MMOL/L (ref 3–14)
BASOPHILS # BLD AUTO: 0 10E9/L (ref 0–0.2)
BASOPHILS NFR BLD AUTO: 0.3 %
BUN SERPL-MCNC: 10 MG/DL (ref 7–30)
CALCIUM SERPL-MCNC: 9.1 MG/DL (ref 8.5–10.1)
CHLORIDE SERPL-SCNC: 109 MMOL/L (ref 94–109)
CO2 SERPL-SCNC: 25 MMOL/L (ref 20–32)
CREAT SERPL-MCNC: 0.55 MG/DL (ref 0.52–1.04)
DIFFERENTIAL METHOD BLD: ABNORMAL
EOSINOPHIL # BLD AUTO: 0.5 10E9/L (ref 0–0.7)
EOSINOPHIL NFR BLD AUTO: 12.9 %
ERYTHROCYTE [DISTWIDTH] IN BLOOD BY AUTOMATED COUNT: 14.7 % (ref 10–15)
GFR SERPL CREATININE-BSD FRML MDRD: >90 ML/MIN/{1.73_M2}
GLUCOSE SERPL-MCNC: 92 MG/DL (ref 70–99)
HCT VFR BLD AUTO: 33.9 % (ref 35–47)
HGB BLD-MCNC: 10.7 G/DL (ref 11.7–15.7)
IMM GRANULOCYTES # BLD: 0 10E9/L (ref 0–0.4)
IMM GRANULOCYTES NFR BLD: 0.5 %
LYMPHOCYTES # BLD AUTO: 0.4 10E9/L (ref 0.8–5.3)
LYMPHOCYTES NFR BLD AUTO: 9.7 %
MCH RBC QN AUTO: 30.4 PG (ref 26.5–33)
MCHC RBC AUTO-ENTMCNC: 31.6 G/DL (ref 31.5–36.5)
MCV RBC AUTO: 96 FL (ref 78–100)
MONOCYTES # BLD AUTO: 0.6 10E9/L (ref 0–1.3)
MONOCYTES NFR BLD AUTO: 15.7 %
NEUTROPHILS # BLD AUTO: 2.3 10E9/L (ref 1.6–8.3)
NEUTROPHILS NFR BLD AUTO: 60.9 %
NRBC # BLD AUTO: 0 10*3/UL
NRBC BLD AUTO-RTO: 0 /100
PLATELET # BLD AUTO: 200 10E9/L (ref 150–450)
POTASSIUM SERPL-SCNC: 3.3 MMOL/L (ref 3.4–5.3)
POTASSIUM SERPL-SCNC: 3.7 MMOL/L (ref 3.4–5.3)
RBC # BLD AUTO: 3.52 10E12/L (ref 3.8–5.2)
SODIUM SERPL-SCNC: 139 MMOL/L (ref 133–144)
WBC # BLD AUTO: 3.8 10E9/L (ref 4–11)

## 2021-07-09 PROCEDURE — 85025 COMPLETE CBC W/AUTO DIFF WBC: CPT | Performed by: PHYSICIAN ASSISTANT

## 2021-07-09 PROCEDURE — G0463 HOSPITAL OUTPT CLINIC VISIT: HCPCS

## 2021-07-09 PROCEDURE — 84132 ASSAY OF SERUM POTASSIUM: CPT | Performed by: PHYSICIAN ASSISTANT

## 2021-07-09 PROCEDURE — 36415 COLL VENOUS BLD VENIPUNCTURE: CPT | Performed by: PHYSICIAN ASSISTANT

## 2021-07-09 PROCEDURE — 250N000013 HC RX MED GY IP 250 OP 250 PS 637: Performed by: INTERNAL MEDICINE

## 2021-07-09 PROCEDURE — 97116 GAIT TRAINING THERAPY: CPT | Mod: GP | Performed by: PHYSICAL THERAPIST

## 2021-07-09 PROCEDURE — 250N000013 HC RX MED GY IP 250 OP 250 PS 637: Performed by: PHYSICIAN ASSISTANT

## 2021-07-09 PROCEDURE — 97530 THERAPEUTIC ACTIVITIES: CPT | Mod: GO | Performed by: STUDENT IN AN ORGANIZED HEALTH CARE EDUCATION/TRAINING PROGRAM

## 2021-07-09 PROCEDURE — 97112 NEUROMUSCULAR REEDUCATION: CPT | Mod: GP | Performed by: PHYSICAL THERAPIST

## 2021-07-09 PROCEDURE — 97535 SELF CARE MNGMENT TRAINING: CPT | Mod: GO | Performed by: STUDENT IN AN ORGANIZED HEALTH CARE EDUCATION/TRAINING PROGRAM

## 2021-07-09 PROCEDURE — 97110 THERAPEUTIC EXERCISES: CPT | Mod: GP | Performed by: PHYSICAL THERAPIST

## 2021-07-09 PROCEDURE — 120N000009 HC R&B SNF

## 2021-07-09 PROCEDURE — 80048 BASIC METABOLIC PNL TOTAL CA: CPT | Performed by: PHYSICIAN ASSISTANT

## 2021-07-09 RX ORDER — POTASSIUM CHLORIDE 750 MG/1
40 TABLET, EXTENDED RELEASE ORAL ONCE
Status: COMPLETED | OUTPATIENT
Start: 2021-07-09 | End: 2021-07-09

## 2021-07-09 RX ADMIN — PANTOPRAZOLE SODIUM 40 MG: 40 TABLET, DELAYED RELEASE ORAL at 16:28

## 2021-07-09 RX ADMIN — RIVAROXABAN 15 MG: 15 TABLET, FILM COATED ORAL at 09:12

## 2021-07-09 RX ADMIN — QUETIAPINE 25 MG: 25 TABLET ORAL at 20:38

## 2021-07-09 RX ADMIN — LEVETIRACETAM 1250 MG: 750 TABLET, FILM COATED ORAL at 20:38

## 2021-07-09 RX ADMIN — PANTOPRAZOLE SODIUM 40 MG: 40 TABLET, DELAYED RELEASE ORAL at 06:07

## 2021-07-09 RX ADMIN — ACETAMINOPHEN 650 MG: 325 TABLET, FILM COATED ORAL at 20:37

## 2021-07-09 RX ADMIN — BUSPIRONE HYDROCHLORIDE 30 MG: 15 TABLET ORAL at 20:38

## 2021-07-09 RX ADMIN — FUROSEMIDE 40 MG: 40 TABLET ORAL at 09:12

## 2021-07-09 RX ADMIN — SULFAMETHOXAZOLE AND TRIMETHOPRIM 1 TABLET: 400; 80 TABLET ORAL at 09:11

## 2021-07-09 RX ADMIN — BUSPIRONE HYDROCHLORIDE 30 MG: 15 TABLET ORAL at 09:10

## 2021-07-09 RX ADMIN — LINACLOTIDE 290 MCG: 145 CAPSULE, GELATIN COATED ORAL at 06:07

## 2021-07-09 RX ADMIN — AMLODIPINE BESYLATE 5 MG: 5 TABLET ORAL at 09:11

## 2021-07-09 RX ADMIN — POTASSIUM CHLORIDE 20 MEQ: 1.5 FOR SOLUTION ORAL at 09:10

## 2021-07-09 RX ADMIN — LEVETIRACETAM 1250 MG: 750 TABLET, FILM COATED ORAL at 09:11

## 2021-07-09 RX ADMIN — RIVAROXABAN 15 MG: 15 TABLET, FILM COATED ORAL at 18:34

## 2021-07-09 RX ADMIN — POTASSIUM CHLORIDE 40 MEQ: 750 TABLET, EXTENDED RELEASE ORAL at 16:28

## 2021-07-09 RX ADMIN — FLUOXETINE HYDROCHLORIDE 60 MG: 20 CAPSULE ORAL at 09:12

## 2021-07-09 ASSESSMENT — MIFFLIN-ST. JEOR: SCORE: 1154.83

## 2021-07-09 NOTE — PLAN OF CARE
"  VS: /61 (BP Location: Right arm)   Pulse 76   Temp 97.5  F (36.4  C) (Oral)   Resp 16   Ht 1.6 m (5' 2.99\")   Wt 67.5 kg (148 lb 12.8 oz)   SpO2 93%   BMI 26.37 kg/m      O2: RA; denies SOB   Output: PureWick NOC; off during day. Pt calls to use BSC.   Last BM: 7/9; large BM w/ incontinence on way to BSC   Activity: Participated in therapies. A-1 PETER MI   Skin: See WOC RN note   Pain: denies   CMS/Neuro: No s/s of seizure activity   Dressing: Shin CDI QOD   Diet: Combo Reg; small sips   LDA: none   Equipment: GB, WW, BSC, suction   Plan: Alarms on for safety. Con't POC.        Patient's most recent vital signs are:     Vital signs:  BP: 116/61  Temp: 97.5  HR: 76  RR: 16  SpO2: 93 %     Patient does not have new respiratory symptoms.  Patient does not have new sore throat.  Patient does not have a fever greater than 99.5.    "

## 2021-07-09 NOTE — PLAN OF CARE
Discharge Planner Post-Acute Rehab OT:      Discharge Plan: SW working on placement with family for LTC facility.      Precautions: falls     Current Status:  ADLs: CGA-Min A sit <> stand with FWW, CGA-Min A ambulation with FWW and w/c follow d/t fatigue, Mod A LB dressing from EOB but min A when reclined in bed, SBA-Min A UB dressing, SBA bed mobility when given extra time.   IADLs: NT  Vision/Cognition: Pt oriented with short term memory deficits present.  Pt reports some difficulty reading as well     Assessment: Pt seen for toilet transfer training. Pt walked to toilet with walker and CGA. Pt completed transfer using GB and walker with CGA and pt able to manage clothing CGA in standing. Pt can partially reach for brad cares seated. Encourage pt to use toilet with staff and to manage her own clothes.      Other Barriers to Discharge (DME, Family Training, etc): Safety, family training.

## 2021-07-09 NOTE — PLAN OF CARE
Alert and oriented, can make needs known. Bed alarm on for safety. Assist of 1 with walker and GB. Pure-wick in place. LBM-07/08/21. Appears to be sleeping well. Seizure precaution maintained- side ralis padded. No acute issue or concern reported. Continue with POC.     Patient's most recent vital signs are:     Vital signs:  BP: 109/67  Temp: 97.1  HR: 89  RR: 18  SpO2: 98 %     Patient does not have new respiratory symptoms.  Patient does not have new sore throat.  Patient does not have a fever greater than 99.5.

## 2021-07-09 NOTE — PROGRESS NOTES
Transitional Care Rounds Note    Date:   7/9/21  Unit Day:   34   Diagnosis:   Final diagnoses:   Physical deconditioning   Glioblastoma -- S/P Surg Resection 2/23/21      Code Status:Full Code     Current mobility: A x 1 w/ walker  Therapy Goal: independent  Discharge Equipment: walker  Discharge services needed: NA - pt discharging to facility.    Purpose:   Daily rounds    Attendance:       Treatment Team:   SW, RNCC, MDS, PT/OT, providers, dieticians, discharge pharm, nursing    Discussion:       Medical Overview and Update:   PA managing nausea - increasing PRNs.      Patient/Family Questions and Concerns:   Pt's family very involved in plan of care. The only people that should be receiving information are: La Nena Bishop Damon and Alexsandra      Treatment Team Recommendations:   NA    Follow-Up Plan:       Teaching Needs     NA    Conclusion:       Discharging to: [facility] in [place]. CAMRON working with family to ensure the facility will work for the patient and her family. See CAMRON note for additional details - family asking to pursue [facility]. Patient is working on the financial component of placement.        Taryn Camacho RN .................... 7/9/2021 1:35 PM

## 2021-07-09 NOTE — PLAN OF CARE
Discharge Planner Post-Acute Rehab PT:     Discharge Plan: Pt requesting to pursue LTC   Precautions: fall, hx of recent seizures, swallowing     Current Status:  Bed Mobility: variable cga to MAX A, Mod assist to scoot hips  Transfer: cga to min A stand pivot with FWW, difficulty with anterior WS coming to stand, also WB through forefoot and has posterior lean  Gait: 200 feet with FWW Gerard and w/c follow for safety  Stairs: 6 stairs, 2 rails, Gerard  Balance: cga to occasional min A when fatigued.     Assessment:   Pt with good participation.  Pt needing cues to keep FWW closer with gait for posture and safety, Gerard with ambulation.  Pt with JACOBSEN with standing/ambulation activities.  Pt needing cues for balance corrections, and Gerard at times with balance activities.  Cont with balance, strengthening to improve function and reduce fall risk.   Other Barriers to Discharge (DME, Family Training, etc): functional tolerance, medical complexity, functional status, neuro involvement

## 2021-07-09 NOTE — CONSULTS
[facility] Nurse Inpatient Wound Assessment   Reason for consultation: Evaluate and treat right shin skin tear, head hematoma    Assessment  Right shin wound due to Skin Tear  Status: stable     Posterior head hematoma due to Trauma, fall  Status: improving    Hematoma on back of head should be assessed regularly for tissue necrosis or fluid collection needing draining.     Treatment Plan  Right shin wound: Every other day: wash wound gently with wound cleanser and gauze. Cover open area with Adaptic, hold in place with Mepilex.   Posterior head hematoma: continue to ice. No open areas needing dressings.   Orders Written  Recommended provider order: None, at this time  WO Nurse follow-up plan:signing off  Nursing to notify the Provider(s) and re-consult the WO Nurse if wound(s) deteriorates or new skin concern.    Patient History  According to provider note(s):  Per Mayra Leija PA-C on 6/27/2021: Olga Bailey is a 75 year old female with a hx of glioblastoma multiforme s/p resection (2/23/21), HTN, GERD, asthma, anxiety and depression. She was initially admitted to [facility] from 4/16-5/15 for acute hypoxic respiratory failure 2/2 PJP pneumonia. [facility] but required readmission to [facility] on 5/26 for seizure activity. During her hospitalization her Keppra dose was increased, steroids were started and she remained seizure free. She was transferred back to  TCU on 6/5 for ongoing rehabilitation.     Objective Data  Containment of urine/stool: Brief    Active Diet Order  Orders Placed This Encounter      Combination Diet Regular Diet      Output:   I/O last 3 completed shifts:  In: 480 [P.O.:480]  Out: 1250 [Urine:1250]    Risk Assessment:   Sensory Perception: 3-->slightly limited  Moisture: 3-->occasionally moist  Activity: 3-->walks occasionally  Mobility: 3-->slightly limited  Nutrition: 3-->adequate  Friction and Shear: 2-->potential problem  Gerardo Score: 17                          Labs:    Recent Labs   Lab 07/09/21  0748   HGB 10.7*   WBC 3.8*       Physical Exam  Areas of skin assessed: focused bilateral lower legs, head    Wound Location:  Right shin    6/28 Right shin    Date of last photo 6/28/2021  Wound History: Skin tear    Wound Base: 100 % dermis     Palpation of the wound bed: normal      Drainage: scant     Description of drainage: serosanguinous     Measurements (length x width x depth, in cm)  2  x 2  x  0.1 cm   Periwound skin: intact      Color: pink      Temperature: normal   Odor: none  Pain: denies    Location: [place]    6/28 Occiput    S/p fall, large hematoma noted on back of head. Area should be assessed regularly for tissue necrosis or extensive fluid collection needing draining.     Skin is firm, tender to the touch. No open skin noted on assessment today.     7/9: much improved and resolving.     Interventions  Visual inspection and assessment completed   Wound Care Rationale Promote moist wound healing without tissue dehydration   Wound Care: done per plan of care  Supplies: floor stock  Current off-loading measures: Pillows  Current support surface: Standard  Atmos Air mattress  Education provided to: importance of repositioning and wound progress  Discussed plan of care with Patient and Nurse    Oanh Rosado RN, CWOCN

## 2021-07-09 NOTE — PLAN OF CARE
RN: Pt A/O x4, VSS. Pt rested in bed for entire of the shift, independent with bed mobilities. Appetite good, had large BM this shift. Dressing changed to right shin as ordered. Pt denies dpain or SOB and has no complaints. Continue with POC. Bed alarm applied.      Patient's most recent vital signs are:     Vital signs:  BP: 109/67  Temp: 97.1  HR: 89  RR: 18  SpO2: 98 %     Patient doesn't  have new respiratory symptoms.  Patient doesn't have new sore throat.  Patient doesn't have a fever greater than 99.5.

## 2021-07-10 ENCOUNTER — APPOINTMENT (OUTPATIENT)
Dept: OCCUPATIONAL THERAPY | Facility: SKILLED NURSING FACILITY | Age: 76
End: 2021-07-10
Payer: MEDICARE

## 2021-07-10 ENCOUNTER — APPOINTMENT (OUTPATIENT)
Dept: PHYSICAL THERAPY | Facility: SKILLED NURSING FACILITY | Age: 76
End: 2021-07-10
Payer: MEDICARE

## 2021-07-10 PROCEDURE — 97110 THERAPEUTIC EXERCISES: CPT | Mod: GO

## 2021-07-10 PROCEDURE — 97110 THERAPEUTIC EXERCISES: CPT | Mod: GP | Performed by: PHYSICAL THERAPIST

## 2021-07-10 PROCEDURE — 97116 GAIT TRAINING THERAPY: CPT | Mod: GP | Performed by: PHYSICAL THERAPIST

## 2021-07-10 PROCEDURE — 120N000009 HC R&B SNF

## 2021-07-10 PROCEDURE — 250N000013 HC RX MED GY IP 250 OP 250 PS 637: Performed by: PHYSICIAN ASSISTANT

## 2021-07-10 PROCEDURE — 250N000013 HC RX MED GY IP 250 OP 250 PS 637: Performed by: INTERNAL MEDICINE

## 2021-07-10 PROCEDURE — 97112 NEUROMUSCULAR REEDUCATION: CPT | Mod: GP | Performed by: PHYSICAL THERAPIST

## 2021-07-10 PROCEDURE — 97530 THERAPEUTIC ACTIVITIES: CPT | Mod: GP | Performed by: PHYSICAL THERAPIST

## 2021-07-10 PROCEDURE — 97535 SELF CARE MNGMENT TRAINING: CPT | Mod: GO

## 2021-07-10 RX ADMIN — LEVETIRACETAM 1250 MG: 750 TABLET, FILM COATED ORAL at 08:48

## 2021-07-10 RX ADMIN — RIVAROXABAN 15 MG: 15 TABLET, FILM COATED ORAL at 17:28

## 2021-07-10 RX ADMIN — QUETIAPINE 25 MG: 25 TABLET ORAL at 20:46

## 2021-07-10 RX ADMIN — LEVETIRACETAM 1250 MG: 750 TABLET, FILM COATED ORAL at 20:46

## 2021-07-10 RX ADMIN — BUSPIRONE HYDROCHLORIDE 30 MG: 15 TABLET ORAL at 08:49

## 2021-07-10 RX ADMIN — FLUOXETINE HYDROCHLORIDE 60 MG: 20 CAPSULE ORAL at 08:49

## 2021-07-10 RX ADMIN — PANTOPRAZOLE SODIUM 40 MG: 40 TABLET, DELAYED RELEASE ORAL at 16:18

## 2021-07-10 RX ADMIN — AMLODIPINE BESYLATE 5 MG: 5 TABLET ORAL at 08:50

## 2021-07-10 RX ADMIN — ACETAMINOPHEN 650 MG: 325 TABLET, FILM COATED ORAL at 20:46

## 2021-07-10 RX ADMIN — FUROSEMIDE 40 MG: 40 TABLET ORAL at 08:50

## 2021-07-10 RX ADMIN — SULFAMETHOXAZOLE AND TRIMETHOPRIM 1 TABLET: 400; 80 TABLET ORAL at 08:48

## 2021-07-10 RX ADMIN — BUSPIRONE HYDROCHLORIDE 30 MG: 15 TABLET ORAL at 20:46

## 2021-07-10 RX ADMIN — LINACLOTIDE 290 MCG: 145 CAPSULE, GELATIN COATED ORAL at 06:40

## 2021-07-10 RX ADMIN — PANTOPRAZOLE SODIUM 40 MG: 40 TABLET, DELAYED RELEASE ORAL at 06:40

## 2021-07-10 RX ADMIN — POTASSIUM CHLORIDE 20 MEQ: 1.5 FOR SOLUTION ORAL at 08:48

## 2021-07-10 RX ADMIN — RIVAROXABAN 15 MG: 15 TABLET, FILM COATED ORAL at 08:49

## 2021-07-10 NOTE — PLAN OF CARE
VS: VSS-denies pain, SOB, chest pain, nausea and dizziness.    O2: Sating 95% on RA   Output: Continent via BSC day/loni and Pure wick on NOC shift.    Last BM: 07/09/21   Activity: Assist of 1 with GB and walker    Skin: Mutiple bruises all over body present, hematoma posterior head    Pain: Complained intermittent headache at bedtime, managed with PRN Tylenol x 1.    CMS: Intact    Dressing: Right shin, dressing CDI.    Diet: Combination Dysphagia with thin liquids-fair appetite    LDA: none   Equipment: Wheelchair, BSC, walker, GB   Plan: On K+ replacement protocol. Potassium replaced evening shift (40mEq) . Recheck level at 2130 was at 3.7.    Additional Info: Alert and oriented, can make needs known. Bed alarm on for safety. Appears to be sleeping well. Seizure precaution maintained- side ralis padded. No acute issue or concern reported.          Patient's most recent vital signs are:     Vital signs:  BP: 120/77  Temp: 98.7  HR: 89  RR: 16  SpO2: 95 %     Patient does not have new respiratory symptoms.  Patient does not have new sore throat.  Patient does not have a fever greater than 99.5.

## 2021-07-10 NOTE — PLAN OF CARE
Discharge Planner Post-Acute Rehab OT:      Discharge Plan: SW working on placement with family for LTC facility.      Precautions: falls     Current Status:  ADLs: CGA-Min A sit <> stand with FWW, CGA-Min A ambulation with FWW and w/c follow d/t fatigue, Mod A LB dressing from EOB but min A when reclined in bed, SBA-Min A UB dressing, SBA bed mobility when given extra time.   IADLs: NT  Vision/Cognition: Pt oriented with short term memory deficits present.  Pt reports some difficulty reading as well     Assessment: Tx focus on bed mobility, gown change, LB dressing, toileting. Pt amb to/from toilet with FWW and CGA. Pt trialed clothing management and brad cares today with increased indepndence although continues to require assist for brad cares d/t difficulty reaching. Pt tolerated approx 10 minutes standing EOS completing g/h with SBA-Mod A for balance (when fatigued). Overall, pt appeared with increased energy/strength today when completing transfers/functional tasks, although continues to have R side inattention/weakness noted especially during transfers/mobility; one LOB towards R when pivoting towards w/c requiring Total A for correction. Cont with POC.     Other Barriers to Discharge (DME, Family Training, etc): Safety, family training.

## 2021-07-10 NOTE — PLAN OF CARE
Discharge Planner Post-Acute Rehab PT:     Discharge Plan: Pt requesting to pursue LTC   Precautions: fall, hx of recent seizures, swallowing     Current Status:  Bed Mobility: variable cga to MAX A, Mod assist to scoot hips  Transfer: cga to min A stand pivot with FWW, difficulty with anterior WS coming to stand, also WB through forefoot and has posterior lean  Gait: 200 feet with FWW Gerard and w/c follow for safety  Stairs: 6 stairs, 2 rails, Gerard  Balance: cga to occasional min A when fatigued.     Assessment:   Pt had great participation throughout session.  Pt continues to need PT in order to address sitting and standing balance.  Pt has significant postural deficits showing posterior lean, flexed trunk and L rotation.  Pt fatigued during gait and decreases overall quality following 125'.  Pt will continue to work on overall posture, balance, gait and functional endurance in PT.  Other Barriers to Discharge (DME, Family Training, etc): functional tolerance, medical complexity, functional status, neuro involvement

## 2021-07-10 NOTE — PLAN OF CARE
Patient A & O x 3.Able to verbalize needs.AO1 to bedside commode,voiding okay,no BM this shift,last was 7/9.Right leg dressing CDI,due for change today.  Denies pain/SOB/chest pain.Participating in therapies.continue with POC.        Patient's most recent vital signs are:     Vital signs:  BP: 118/75  Temp: 97.7  HR: 97  RR: 16  SpO2: 94 %     Patient does not have new respiratory symptoms.  Patient does not have new sore throat.  Patient does not have a fever greater than 99.5.

## 2021-07-10 NOTE — PLAN OF CARE
"  VS: /77 (BP Location: Right arm)   Pulse 89   Temp 98.7  F (37.1  C) (Oral)   Resp 16   Ht 1.6 m (5' 2.99\")   Wt 69.1 kg (152 lb 4.8 oz)   SpO2 95%   BMI 26.99 kg/m     O2: O2 sat 95% on RA, denies SOB or respiratory distress   Output: Voided spontaneously with use of BSC. Purewick in place when in bed    Last BM: 7/9   Activity: Needs assist of one with gait belt and walker    Up for meals? Up in chair for meals   Skin: Right shin wound    Pain: Denies pain or discomfort    CMS: Alert & oriented, denies numbness or tingling    Dressing: Right shin mepilex CDI   Diet: Regular, modified texture with thin liquids    LDA: None    Equipment: Walker, w/c, and personal belongings    Plan: Will continue plan of care    Additional Info: Potassium 40 meq replaced this evening, recheck level due at 2030       Patient's most recent vital signs are:     Vital signs:  BP: 120/77  Temp: 98.7  HR: 89  RR: 16  SpO2: 95 %     Patient does not have new respiratory symptoms.  Patient does not have new sore throat.  Patient does not have a fever greater than 99.5.           "

## 2021-07-11 LAB — SARS-COV-2 RNA RESP QL NAA+PROBE: NEGATIVE

## 2021-07-11 PROCEDURE — 87635 SARS-COV-2 COVID-19 AMP PRB: CPT | Performed by: INTERNAL MEDICINE

## 2021-07-11 PROCEDURE — 120N000009 HC R&B SNF

## 2021-07-11 PROCEDURE — 250N000013 HC RX MED GY IP 250 OP 250 PS 637: Performed by: PHYSICIAN ASSISTANT

## 2021-07-11 PROCEDURE — 250N000013 HC RX MED GY IP 250 OP 250 PS 637: Performed by: INTERNAL MEDICINE

## 2021-07-11 RX ORDER — POTASSIUM CHLORIDE 1.5 G/1.58G
20 POWDER, FOR SOLUTION ORAL DAILY
Status: DISCONTINUED | OUTPATIENT
Start: 2021-07-11 | End: 2021-07-14

## 2021-07-11 RX ADMIN — PANTOPRAZOLE SODIUM 40 MG: 40 TABLET, DELAYED RELEASE ORAL at 08:20

## 2021-07-11 RX ADMIN — LEVETIRACETAM 1250 MG: 750 TABLET, FILM COATED ORAL at 08:23

## 2021-07-11 RX ADMIN — SULFAMETHOXAZOLE AND TRIMETHOPRIM 1 TABLET: 400; 80 TABLET ORAL at 08:19

## 2021-07-11 RX ADMIN — POTASSIUM CHLORIDE 20 MEQ: 1.5 FOR SOLUTION ORAL at 08:17

## 2021-07-11 RX ADMIN — LINACLOTIDE 290 MCG: 145 CAPSULE, GELATIN COATED ORAL at 08:18

## 2021-07-11 RX ADMIN — RIVAROXABAN 15 MG: 15 TABLET, FILM COATED ORAL at 17:50

## 2021-07-11 RX ADMIN — AMLODIPINE BESYLATE 5 MG: 5 TABLET ORAL at 08:18

## 2021-07-11 RX ADMIN — BUSPIRONE HYDROCHLORIDE 30 MG: 15 TABLET ORAL at 08:23

## 2021-07-11 RX ADMIN — CALCIUM POLYCARBOPHIL 625 MG: 625 TABLET, FILM COATED ORAL at 08:19

## 2021-07-11 RX ADMIN — LEVETIRACETAM 1250 MG: 750 TABLET, FILM COATED ORAL at 21:21

## 2021-07-11 RX ADMIN — BUSPIRONE HYDROCHLORIDE 30 MG: 15 TABLET ORAL at 21:21

## 2021-07-11 RX ADMIN — FUROSEMIDE 40 MG: 40 TABLET ORAL at 08:17

## 2021-07-11 RX ADMIN — ACETAMINOPHEN 650 MG: 325 TABLET, FILM COATED ORAL at 21:26

## 2021-07-11 RX ADMIN — QUETIAPINE 25 MG: 25 TABLET ORAL at 21:21

## 2021-07-11 RX ADMIN — PANTOPRAZOLE SODIUM 40 MG: 40 TABLET, DELAYED RELEASE ORAL at 16:25

## 2021-07-11 RX ADMIN — RIVAROXABAN 15 MG: 15 TABLET, FILM COATED ORAL at 08:17

## 2021-07-11 RX ADMIN — FLUOXETINE HYDROCHLORIDE 60 MG: 20 CAPSULE ORAL at 08:16

## 2021-07-11 NOTE — PLAN OF CARE
"VS: Vital signs:  Temp: 98.2  F (36.8  C) Temp src: Oral BP: (!) 128/101 Pulse: 101   Resp: 16 SpO2: 91 % O2 Device: None (Room air)   Height: 160 cm (5' 2.99\") Weight: 69.1 kg (152 lb 4.8 oz)  Estimated body mass index is 26.99 kg/m  as calculated from the following:    Height as of this encounter: 1.6 m (5' 2.99\").    Weight as of this encounter: 69.1 kg (152 lb 4.8 oz).       O2: RA   Output: Incontinent with both bladder as well as bowels   Last BM: 7/11/21   Activity: AO1 with GB and walker to bathroom.   Skin: Multiple bruises all over body,blanchable redness coccyx   Pain: Denies   CMS: Intact   Dressing: R shin   Diet: Dysphagia with thin liquids   LDA: none   Equipment: W/c,GB,BSC,walker & personal belongings   Plan: Continue with POC,bed/chair alarm & seizure precaution.   Additional Info: Patient was soaked in urine @ beginning of shift.Purewick was not in right position.All bed sheets changed plus pull ups and gown.Son with wife took the patient outside.    .    Patient's most recent vital signs are:     Vital signs:  BP: 128/101  Temp: 98.2  HR: 101  RR: 16  SpO2: 91 %     Patient does not have new respiratory symptoms.  Patient does not have new sore throat.  Patient does not have a fever greater than 99.5.            "

## 2021-07-11 NOTE — PROGRESS NOTES
[facility]    Medicine Progress Note - Hospitalist Service       Date of Admission:  6/5/2021    Assessment & Plan           Assessment & Plan: Olga Bailey is a 75 year old woman with a history of glioblastoma multiforme s/p resection (2/23/21), HTN, GERD, asthma, anxiety and depression. She was initially admitted to [facility] from 4/16-5/15 for acute hypoxic respiratory failure 2/2 PJP pneumonia. She was discharged to  TCU but required readmission to [facility] on 5/26 for seizure activity. During her hospitalization her Keppra dose was increased, steroids were started, and she remained seizure free. She was transferred back to  TCU on 6/5 for ongoing rehabilitation.     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 Baker, Oncology as well as Dr. Spann, Radiation Oncology. Completed 15/15 sessions of radiation on 5/27, last saw rad onc 6/24 with plan to follow up as needed. Had breakthrough seizure 5/26 prompting inpatient admission. She was started on dexamethasone taper (completed 6/9), and Keppra dose was increased to 1250 mg BID. On 6/16, there was concern for possible recurrent seizure like activity as health psychologist reported witnessing the patient have 3 episodes where her eyes closed and she licked her lips during their 10 minute conversation. This had not been witnessed by staff prior to this. Discussed with neurology and patient had routine EEG on 6/16 without epileptiform discharges or seizures but with some mild abnormal slowing of background activity. During this period of time, medicine noticed patient being more sedated, therefore doxepin discontinued 6/15 and Seroquel 25 mg BID discontinued with evening dose decreased from 50 to 25 mg. Now mentation much improved. Suspect sedation was culprit. Has pronator drift to R arm at baseline, and chronically does  not get the year right after her tumor resection. MRI Brain 6/22 w/ interval increase in size of enhancing parenchymal nodule just superior to left parietal lobe resection cavity, most c/f disease recurrence/progression.     - Continue seizure precautions  - Continue Keppra 1250 mg BID  - Follow up with w/ Oncology / Dr. Baker is 7/27/21    Antibiotic prophylaxis  - Remains on Bactrim prophylaxis despite being off steroids. Per oncology telephone encounter in chart on 6/29 can stop Bactrim once ALC consistently > 0.5.     BLE DVTs (3/29/21) s/p IVC filter (4/16/21)  Right Retroperitoneal Hematoma s/p transfusion (4/14/21)  US 3/29/21 revealed BLE DVTs. CT negative for PE. On 4/14 developed spontaneous retroperitoneal bleed requiring 4 units PRBCs.      - Continue Xarelto 15 mg BID through 7/14, then start 20 mg every evening on 7/15.   - Has been referred to hematology by Dr. Baker for consideration of potential future IVC filter removal.      Mechanical Fall with posterior Scalp Hematoma and possible concussion (6/27/21)  Patient had a fall on 6/27 d/t loss of balance. Reported brief LOC. CT Head and C-spine negative for acute process. Xarelto was held 6/27-6/28 and then resumed. Is reporting increased nausea since the fall, but also just did course of chemotherapy and has been requiring potassium pills which can contribute to nausea. Denies dizziness or headache.     - Continue to monitor symptoms and appearance of hematoma.      Major depression with generalized anxiety disorder  Follows with psychiatry and psychology as outpatient, and health psych following here. Was started on Seroquel 25 mg BID + 50 mg at bedtime and doxepin 3 mg at bedtime in 4/2021 for anxiety/insomnia, which helped, but given concern for daytime sedation discontinued doxepin and decreased Seroquel to just 25 mg HS. Mood stable.   - Continue Buspar, Prozac, Seroquel.   - Health Psychology [facility]    Chronic  constipation  -Continue home regimen of Linzess 290 mcg once daily     Hypertension  - No acute issues. Continue home amlodipine 5mg once daily and Lasix 40mg once daily.      Hypokalemia secondary to diuretic use and chemo  -Continue with checking electrolytes MWF and repleting as needed  -Continue potassium 20Meq once daily     Psychiatry medications  -Continue with Seroquel 25mg at bedtime to help with insomnia     Diet: Room Service  Combination Diet Regular Diet  Snacks/Supplements Adult: Other; Any supplement PRN; Between Meals    DVT Prophylaxis: Xarelto and IVC filter  Martino Catheter: Not present  Central Lines: None  Code Status: Full Code    Expected discharge: to [facility] on 7/15/21      TERA Shepherd  Hospitalist Service  [facility]  Securely message with the Vocera Web Console (learn more here)  Text page via [employer] Paging/Directory      Interval History   Ms. Bailey was resting at the edge of her bed today reporting that she is finally starting to feel better and see progress with her rehab. She reports some right sided jaw pain in the evening when she sleeping, likely related to clenching her jaw which she knows about, but forget to bring her mouthguard with her. She denies any fevers, chills, chest pain, shortness of breath or additional concerns. We discussed her discharge plan, treatment plan and she had no further questions or concerns.    Data reviewed today: I reviewed all medications, new labs and imaging results over the last 24 hours.     Physical Exam   Vital Signs: Temp: 98.2  F (36.8  C) Temp src: Oral BP: (!) 128/101 Pulse: 101   Resp: 16 SpO2: 91 % O2 Device: None (Room air)    Weight: 152 lbs 4.8 oz  General Appearance: 75 year old female resting comfortably at the edge of her bed, in good spirits, denies pain at present  Respiratory: breathing comfortably on room air, no adventitious sounds to bilateral auscultation  Cardiovascular: regular rate and  rhythm, no appreciable murmurs, rubs or gallops  GI: tinkering bowel sounds present, soft, non-tender to palpation throughout, no rebound or guarding  Skin: warm, dry, no open sores, lesions or ulcerations  Psych: alert, oriented to name, date, hospital and recent events, not able to remember the year    Data   Recent Labs   Lab 07/09/21  2149 07/09/21  0748 07/08/21  1243 07/05/21  0853   WBC  --  3.8* 5.5 4.7   HGB  --  10.7* 11.7 12.3   MCV  --  96 93 95   PLT  --  200 210 334   NA  --  139 138 139   POTASSIUM 3.7 3.3* 3.9 3.4   CHLORIDE  --  109 107 109   CO2  --  25 22 24   BUN  --  10 10 9   CR  --  0.55 0.53 0.62   ANIONGAP  --  5 9 6   ESTIVEN  --  9.1 9.2 9.6   GLC  --  92 97 107*

## 2021-07-11 NOTE — PROGRESS NOTES
Patient slept through the night and did not call. Patient rounded on every two hours to assure safety and stability. No complaints of pain. Purewick is in place since evening shift. A1 with gait belt and walker. Seizure precautions in place. No PIV access. Patient is RN managed potassium replacement, but did not need replacement yesterday.

## 2021-07-11 NOTE — PLAN OF CARE
VS: VSS-denies pain, SOB, chest pain, nausea and dizziness.    O2: Sating 95% on RA   Output: Continent via toilet day/loni and Pure wick on at night    Last BM: 07/10/21   Activity: Assist of 1 with GB and walker    Skin: Mutiple bruises all over body present, hematoma posterior head    Pain: Complained intermittent headache at bedtime, managed with PRN Tylenol x 1.    CMS: Intact    Dressing: Right shin, dressing changed.     Diet: Combination Dysphagia with thin liquids-fair appetite    LDA: none   Equipment: Wheelchair, BSC, walker, GB   Plan: Continue with POC   Additional Info: Alert and oriented, can make needs known. Bed alarm on for safety. Seizure precaution maintained- side ralis padded. No acute issue or concern reported. Call light is within reach.          Patient's most recent vital signs are:     Vital signs:  BP: 115/69  Temp: 97.5  HR: 85  RR: 16  SpO2: 95 %     Patient does not have new respiratory symptoms.  Patient does not have new sore throat.  Patient does not have a fever greater than 99.5.

## 2021-07-12 ENCOUNTER — APPOINTMENT (OUTPATIENT)
Dept: OCCUPATIONAL THERAPY | Facility: SKILLED NURSING FACILITY | Age: 76
DRG: 055 | End: 2021-07-12
Attending: INTERNAL MEDICINE
Payer: MEDICARE

## 2021-07-12 ENCOUNTER — APPOINTMENT (OUTPATIENT)
Dept: PHYSICAL THERAPY | Facility: SKILLED NURSING FACILITY | Age: 76
DRG: 055 | End: 2021-07-12
Attending: INTERNAL MEDICINE
Payer: MEDICARE

## 2021-07-12 ENCOUNTER — TELEPHONE (OUTPATIENT)
Dept: ONCOLOGY | Facility: CLINIC | Age: 76
End: 2021-07-12

## 2021-07-12 LAB
ANION GAP SERPL CALCULATED.3IONS-SCNC: 5 MMOL/L (ref 3–14)
BASOPHILS # BLD AUTO: 0 10E3/UL (ref 0–0.2)
BASOPHILS NFR BLD AUTO: 0 %
BUN SERPL-MCNC: 16 MG/DL (ref 7–30)
CALCIUM SERPL-MCNC: 9.4 MG/DL (ref 8.5–10.1)
CHLORIDE BLD-SCNC: 108 MMOL/L (ref 94–109)
CO2 SERPL-SCNC: 27 MMOL/L (ref 20–32)
CREAT SERPL-MCNC: 0.6 MG/DL (ref 0.52–1.04)
EOSINOPHIL # BLD AUTO: 0.5 10E3/UL (ref 0–0.7)
EOSINOPHIL NFR BLD AUTO: 8 %
ERYTHROCYTE [DISTWIDTH] IN BLOOD BY AUTOMATED COUNT: 14.5 % (ref 10–15)
GFR SERPL CREATININE-BSD FRML MDRD: 89 ML/MIN/1.73M2
GLUCOSE BLD-MCNC: 96 MG/DL (ref 70–99)
HCT VFR BLD AUTO: 34.3 % (ref 35–47)
HGB BLD-MCNC: 11 G/DL (ref 11.7–15.7)
IMM GRANULOCYTES # BLD: 0 10E3/UL
IMM GRANULOCYTES NFR BLD: 0 %
LYMPHOCYTES # BLD AUTO: 0.4 10E3/UL (ref 0.8–5.3)
LYMPHOCYTES NFR BLD AUTO: 7 %
MCH RBC QN AUTO: 30.6 PG (ref 26.5–33)
MCHC RBC AUTO-ENTMCNC: 32.1 G/DL (ref 31.5–36.5)
MCV RBC AUTO: 96 FL (ref 78–100)
MONOCYTES # BLD AUTO: 0.5 10E3/UL (ref 0–1.3)
MONOCYTES NFR BLD AUTO: 8 %
NEUTROPHILS # BLD AUTO: 4.8 10E3/UL (ref 1.6–8.3)
NEUTROPHILS NFR BLD AUTO: 77 %
NRBC # BLD AUTO: 0 10E3/UL
NRBC BLD AUTO-RTO: 0 /100
PLATELET # BLD AUTO: 126 10E3/UL (ref 150–450)
POTASSIUM BLD-SCNC: 3.2 MMOL/L (ref 3.4–5.3)
RBC # BLD AUTO: 3.59 10E6/UL (ref 3.8–5.2)
SODIUM SERPL-SCNC: 140 MMOL/L (ref 133–144)
WBC # BLD AUTO: 6.2 10E3/UL (ref 4–11)

## 2021-07-12 PROCEDURE — 85004 AUTOMATED DIFF WBC COUNT: CPT | Performed by: PHYSICIAN ASSISTANT

## 2021-07-12 PROCEDURE — 250N000013 HC RX MED GY IP 250 OP 250 PS 637: Performed by: PHYSICIAN ASSISTANT

## 2021-07-12 PROCEDURE — 80048 BASIC METABOLIC PNL TOTAL CA: CPT | Performed by: PHYSICIAN ASSISTANT

## 2021-07-12 PROCEDURE — 97535 SELF CARE MNGMENT TRAINING: CPT | Mod: GO

## 2021-07-12 PROCEDURE — 97116 GAIT TRAINING THERAPY: CPT | Mod: GP

## 2021-07-12 PROCEDURE — 250N000013 HC RX MED GY IP 250 OP 250 PS 637: Performed by: INTERNAL MEDICINE

## 2021-07-12 PROCEDURE — 36415 COLL VENOUS BLD VENIPUNCTURE: CPT | Performed by: PHYSICIAN ASSISTANT

## 2021-07-12 PROCEDURE — 97530 THERAPEUTIC ACTIVITIES: CPT | Mod: GO

## 2021-07-12 PROCEDURE — 120N000009 HC R&B SNF

## 2021-07-12 PROCEDURE — 97530 THERAPEUTIC ACTIVITIES: CPT | Mod: GP

## 2021-07-12 RX ORDER — POTASSIUM CHLORIDE 1.5 G/1.58G
40 POWDER, FOR SOLUTION ORAL ONCE
Status: COMPLETED | OUTPATIENT
Start: 2021-07-12 | End: 2021-07-12

## 2021-07-12 RX ADMIN — BUSPIRONE HYDROCHLORIDE 30 MG: 15 TABLET ORAL at 10:16

## 2021-07-12 RX ADMIN — RIVAROXABAN 15 MG: 15 TABLET, FILM COATED ORAL at 17:52

## 2021-07-12 RX ADMIN — LEVETIRACETAM 1250 MG: 750 TABLET, FILM COATED ORAL at 10:18

## 2021-07-12 RX ADMIN — FUROSEMIDE 40 MG: 40 TABLET ORAL at 10:20

## 2021-07-12 RX ADMIN — SULFAMETHOXAZOLE AND TRIMETHOPRIM 1 TABLET: 400; 80 TABLET ORAL at 10:17

## 2021-07-12 RX ADMIN — FLUOXETINE HYDROCHLORIDE 60 MG: 20 CAPSULE ORAL at 10:19

## 2021-07-12 RX ADMIN — RIVAROXABAN 15 MG: 15 TABLET, FILM COATED ORAL at 10:19

## 2021-07-12 RX ADMIN — ACETAMINOPHEN 650 MG: 325 TABLET, FILM COATED ORAL at 21:16

## 2021-07-12 RX ADMIN — PANTOPRAZOLE SODIUM 40 MG: 40 TABLET, DELAYED RELEASE ORAL at 06:29

## 2021-07-12 RX ADMIN — POTASSIUM CHLORIDE 20 MEQ: 1.5 FOR SOLUTION ORAL at 10:21

## 2021-07-12 RX ADMIN — LINACLOTIDE 290 MCG: 145 CAPSULE, GELATIN COATED ORAL at 06:30

## 2021-07-12 RX ADMIN — PANTOPRAZOLE SODIUM 40 MG: 40 TABLET, DELAYED RELEASE ORAL at 16:20

## 2021-07-12 RX ADMIN — BUSPIRONE HYDROCHLORIDE 30 MG: 15 TABLET ORAL at 21:13

## 2021-07-12 RX ADMIN — QUETIAPINE 25 MG: 25 TABLET ORAL at 21:13

## 2021-07-12 RX ADMIN — LEVETIRACETAM 1250 MG: 750 TABLET, FILM COATED ORAL at 21:13

## 2021-07-12 RX ADMIN — POTASSIUM CHLORIDE 40 MEQ: 1.5 FOR SOLUTION ORAL at 12:04

## 2021-07-12 RX ADMIN — AMLODIPINE BESYLATE 5 MG: 5 TABLET ORAL at 10:18

## 2021-07-12 ASSESSMENT — MIFFLIN-ST. JEOR: SCORE: 1153.47

## 2021-07-12 NOTE — PLAN OF CARE
"  VS: /70 (BP Location: Right arm)   Pulse 95   Temp 98.3  F (36.8  C) (Oral)   Resp 16   Ht 1.6 m (5' 2.99\")   Wt 68.9 kg (152 lb)   SpO2 93%   BMI 26.93 kg/m       O2: Patient is on room air, breathing without difficulty; denies chest pain and SOB   Output: Patient has external catheter in place.   Last BM: 7/12   Activity: Assist of one with gait belt and walker.    Skin: Right shin has a wound; dressing was changed by previous shift.    Pain: Patient denied pain.    CMS: Aox4  Denies N/T   Dressing: Dressing on right shin covering wound.    Diet: Regular diet and thin liquids.    LDA: External catheter in place.    Equipment: Walker, gait belt, call light, Purewick in place.    Plan: Continue plan of care; monitor for any deviations in mobility. SW currently searching for facilities with available beds.    Additional Info:        "

## 2021-07-12 NOTE — PLAN OF CARE
RN: Pt has no c/o pain or discomfort so far this shift. Purewick in place and functioning accordingly. Bed alarm maintained. Pt has no c/o SOB and no s/s of respiratory issue noted at RA. Appear to be sleeping/resting between cares/ meds. Continue with plan of care.    Patient's most recent vital signs are:     Vital signs:  BP: 113/66  Temp: 97  HR: 98  RR: 18  SpO2: 95 %     Patient does not have new respiratory symptoms.  Patient does not have new sore throat.  Patient does not have a fever greater than 99.5.

## 2021-07-12 NOTE — PLAN OF CARE
Discharge Planner Post-Acute Rehab PT:      Discharge Plan: Pt requesting to pursue LTC   Precautions: fall, hx of recent seizures, swallowing     Current Status:  Bed Mobility: variable cga to MAX A, Mod assist to scoot hips  Transfer: cga to min A stand pivot with FWW, difficulty with anterior WS coming to stand, also WB through forefoot and has posterior lean  Gait: 200 feet with FWW Gerard and w/c follow for safety  Stairs: 6 stairs, 2 rails, Gerard  Balance: cga to occasional min A when fatigued.     Assessment: Focus on static balance tasks w/o UE support to progress stability and ability to attain and sustain stance over COM. Pt demos significant posterior retropulsion tendencies and very limited balance strategies in this direction, needs maxA at times to prevent posterior LOB. Once able to attain static standing able to maintain well, however difficulty responding to changes/ dynamic tasks.     Other Barriers to Discharge (DME, Family Training, etc): functional tolerance, medical complexity, functional status, neuro involvement

## 2021-07-12 NOTE — PLAN OF CARE
VSS. Denies pain, CP, or SOB. Ao1 w/ walker and GB. Had Bm this shift, pure wick in place while in bed. Purewick changed this shift, suction is working and urine is yellow and clear. Potassium was 3.2, potassium protocol initiated and pt received 40 MEQ of potassium, per provider it is okay to recheck potassium tomorrow 7/13. Heels offloaded in bed with pillow. Bed alarm and chair alarm on when in either position. Eating and drinking well. Dressing changed on rt shin.        Patient's most recent vital signs are:     Vital signs:  BP: 126/75  Temp: 97.1  HR: 75  RR: 20  SpO2: 95 %     Patient does not have new respiratory symptoms.  Patient does not have new sore throat.  Patient does not have a fever greater than 99.5.

## 2021-07-12 NOTE — PROGRESS NOTES
CAMRON presented to pt's room to follow-up on whether family had toured [facility]. Pt's dtr, La Nena, was present in room and available to discuss.    La Nena contacted the director of health services at [facility] at the end of last week, and, after reviewing the referral and talking with La Nena, the director stated that the facility would not be able to meet pt's skilled nursing needs at this time. SW to continue seeking placement.    CAMRON sent updated referrals to:    [facility]    CAMRON spoke with admissions coordinator Sri Hernández at [facility], who stated that they still have no available private beds.    CAMRON provided update to pt's son, Dario, and asked for his email address to facilitate conversation going forward. Dario stated that he was fine with La Nena forwarding information to him.     CAMRON called pt's dtr in the afternoon to update her of the above. CAMRON introduced La Nena to the agency [facility]. La Nena stated that a friend of hers had recommended trying to find a service like that for assistance. La Nena asked CAMRON to send a referral; CAMRON emailed info to Lakesha Harper.    SW will continue to remain available for patient and family support, discharge planning, and access to resources.    Frederic SOROT, [facility]  TCU   Phone: [phone]

## 2021-07-12 NOTE — TELEPHONE ENCOUNTER
Daughter La Nena called with a question about Olga's Temodar frequency.    Reviewed order per Treatment plan and instructions per most recent visit with Dr. Baker:  Starting adjuvant-dosed chemotherapy with temozolomide 5 consectutive nights (In a 28 day cycle; 5 days on treatment and 23 days off.)    La Nena verbalized understanding.    JIAN LaytonN, RN, OCN  Oncology Care Coordinator  [facility]

## 2021-07-12 NOTE — PLAN OF CARE
Discharge Planner Post-Acute Rehab OT:      Discharge Plan: SW working on placement with family for LTC facility.      Precautions: falls     Current Status:  ADLs: CGA-Min A sit <> stand with FWW, CGA-Min A ambulation with FWW and w/c follow d/t fatigue, Mod A LB dressing from EOB but min A when reclined in bed, SBA-Min A UB dressing, SBA bed mobility when given extra time.   IADLs: NT  Vision/Cognition: Pt oriented with short term memory deficits present.  Pt reports some difficulty reading as well     Assessment: Pt continues to require assist for functional mobility using FWW when fatigued. Pt progressing with unsupported dynamic sitting balance noted today; tolerating 20 minutes unsupported sitting while engaged in TOSHIA UE activities with occasional assist to correct posterior lean but no LOB backwards. Pt participated in LB dressing today with overall Min A for threading and balance while pulling up. Cont with POC.     Other Barriers to Discharge (DME, Family Training, etc): Safety, family training.

## 2021-07-12 NOTE — PLAN OF CARE
VS: Temp: 97  F (36.1  C) Temp src: Oral BP: 113/66 Pulse: 98   Resp: 18 SpO2: 95 % O2 Device: None (Room air)     O2: 95% RA   Output: Utilizes toilet/ BSC, pure wick at night. Can be incontinent of bowel and bladder.   Last BM: 7/11/21   Activity: Assist of 1 w/ walker and GB   Skin: Skin R shin, new dressing applied after pt shower. Bruising all over body.   Pain: R face pain managed w/ PRN tylenol   CMS: Intact   Dressing: R shin dressing changed, CDI   Diet: Regular   LDA: none   Equipment: BSC, walker & GB   Plan: Continue w/ POC   Additional Info: Pt is alert and oriented x4. Able to make needs known. Denies cp or SOB. Had a shower tonight. Seizure precautions maintained. Call light in reach, continue w/ POC.           Patient's most recent vital signs are:     Vital signs:  BP: 113/66  Temp: 97  HR: 98  RR: 18  SpO2: 95 %     Patient does not have new respiratory symptoms.  Patient does not have new sore throat.  Patient does not have a fever greater than 99.5.

## 2021-07-13 ENCOUNTER — APPOINTMENT (OUTPATIENT)
Dept: OCCUPATIONAL THERAPY | Facility: SKILLED NURSING FACILITY | Age: 76
DRG: 055 | End: 2021-07-13
Attending: INTERNAL MEDICINE
Payer: MEDICARE

## 2021-07-13 ENCOUNTER — APPOINTMENT (OUTPATIENT)
Dept: PHYSICAL THERAPY | Facility: SKILLED NURSING FACILITY | Age: 76
DRG: 055 | End: 2021-07-13
Attending: INTERNAL MEDICINE
Payer: MEDICARE

## 2021-07-13 LAB
HOLD SPECIMEN: NORMAL
POTASSIUM BLD-SCNC: 3.3 MMOL/L (ref 3.4–5.3)
POTASSIUM BLD-SCNC: 3.7 MMOL/L (ref 3.4–5.3)

## 2021-07-13 PROCEDURE — 36415 COLL VENOUS BLD VENIPUNCTURE: CPT

## 2021-07-13 PROCEDURE — 97116 GAIT TRAINING THERAPY: CPT | Mod: GP

## 2021-07-13 PROCEDURE — 250N000013 HC RX MED GY IP 250 OP 250 PS 637: Performed by: PHYSICIAN ASSISTANT

## 2021-07-13 PROCEDURE — 84132 ASSAY OF SERUM POTASSIUM: CPT | Performed by: INTERNAL MEDICINE

## 2021-07-13 PROCEDURE — 97530 THERAPEUTIC ACTIVITIES: CPT | Mod: GO

## 2021-07-13 PROCEDURE — 250N000013 HC RX MED GY IP 250 OP 250 PS 637: Performed by: INTERNAL MEDICINE

## 2021-07-13 PROCEDURE — 99308 SBSQ NF CARE LOW MDM 20: CPT | Performed by: PHYSICIAN ASSISTANT

## 2021-07-13 PROCEDURE — 99207 PR CDG-CODE CATEGORY CHANGED: CPT | Performed by: PHYSICIAN ASSISTANT

## 2021-07-13 PROCEDURE — 36415 COLL VENOUS BLD VENIPUNCTURE: CPT | Performed by: INTERNAL MEDICINE

## 2021-07-13 PROCEDURE — 97112 NEUROMUSCULAR REEDUCATION: CPT | Mod: GP

## 2021-07-13 PROCEDURE — 120N000009 HC R&B SNF

## 2021-07-13 RX ORDER — POTASSIUM CHLORIDE 750 MG/1
40 TABLET, EXTENDED RELEASE ORAL ONCE
Status: COMPLETED | OUTPATIENT
Start: 2021-07-13 | End: 2021-07-13

## 2021-07-13 RX ADMIN — RIVAROXABAN 15 MG: 15 TABLET, FILM COATED ORAL at 17:34

## 2021-07-13 RX ADMIN — QUETIAPINE 25 MG: 25 TABLET ORAL at 21:13

## 2021-07-13 RX ADMIN — ACETAMINOPHEN 650 MG: 325 TABLET, FILM COATED ORAL at 21:13

## 2021-07-13 RX ADMIN — RIVAROXABAN 15 MG: 15 TABLET, FILM COATED ORAL at 08:24

## 2021-07-13 RX ADMIN — AMLODIPINE BESYLATE 5 MG: 5 TABLET ORAL at 08:25

## 2021-07-13 RX ADMIN — SULFAMETHOXAZOLE AND TRIMETHOPRIM 1 TABLET: 400; 80 TABLET ORAL at 08:25

## 2021-07-13 RX ADMIN — LEVETIRACETAM 1250 MG: 750 TABLET, FILM COATED ORAL at 21:12

## 2021-07-13 RX ADMIN — FLUOXETINE HYDROCHLORIDE 60 MG: 20 CAPSULE ORAL at 08:25

## 2021-07-13 RX ADMIN — FUROSEMIDE 40 MG: 40 TABLET ORAL at 08:24

## 2021-07-13 RX ADMIN — POTASSIUM CHLORIDE 40 MEQ: 750 TABLET, EXTENDED RELEASE ORAL at 11:16

## 2021-07-13 RX ADMIN — LEVETIRACETAM 1250 MG: 750 TABLET, FILM COATED ORAL at 08:24

## 2021-07-13 RX ADMIN — LINACLOTIDE 290 MCG: 145 CAPSULE, GELATIN COATED ORAL at 06:35

## 2021-07-13 RX ADMIN — BUSPIRONE HYDROCHLORIDE 30 MG: 15 TABLET ORAL at 08:25

## 2021-07-13 RX ADMIN — BUSPIRONE HYDROCHLORIDE 30 MG: 15 TABLET ORAL at 21:13

## 2021-07-13 RX ADMIN — POTASSIUM CHLORIDE 20 MEQ: 1.5 FOR SOLUTION ORAL at 08:25

## 2021-07-13 RX ADMIN — PANTOPRAZOLE SODIUM 40 MG: 40 TABLET, DELAYED RELEASE ORAL at 06:35

## 2021-07-13 RX ADMIN — CALCIUM POLYCARBOPHIL 625 MG: 625 TABLET, FILM COATED ORAL at 08:25

## 2021-07-13 RX ADMIN — PANTOPRAZOLE SODIUM 40 MG: 40 TABLET, DELAYED RELEASE ORAL at 17:34

## 2021-07-13 NOTE — PLAN OF CARE
Discharge Planner Post-Acute Rehab PT:      Discharge Plan: Pt requesting to pursue LTC   Precautions: fall, hx of recent seizures, swallowing     Current Status:  Bed Mobility: variable cga to MAX A, Mod assist to scoot hips  Transfer: cga to min A stand pivot with FWW, difficulty with anterior WS coming to stand, also WB through forefoot and has posterior lean  Gait: 200 feet with FWW CGA to Gerard  Stairs: 6 stairs, 2 rails, Gerard  Balance: Variable due to posterior lean, improves during session. Mod-A to CGA     Assessment: Improving during session with balance tasks to increase anterior weight shift. Continue to work on balance strategies and posture/strength with gait.    Other Barriers to Discharge (DME, Family Training, etc): functional tolerance, medical complexity, functional status, neuro involvement

## 2021-07-13 NOTE — PLAN OF CARE
Patient slept well overnight.  No complaints of pain. Purewick on for the overnight and with good output. Lbm 07/12. On seizure precautions, seizure pads remain in place. No SOB. Call light in reach. Continue POC.       Patient's most recent vital signs are:     Vital signs:  BP: 117/70  Temp: 98.3  HR: 95  RR: 16  SpO2: 93 %     Patient does not have new respiratory symptoms.  Patient does not have new sore throat.  Patient does not have a fever greater than 99.5.

## 2021-07-13 NOTE — PROGRESS NOTES
Sri Hernández, admissions coordinator at [facility], called CAMRON to notify me that a private room is becoming available on 7/15. Pt would be clinically accepted as a private-pay resident. [facility] could plan a physical therapy maintenance program for pt. Pt would need to quarantine for 14 days after admission, but the facility would work to obtain her second Moderna dose.     CAMRON sent an email to pt's dtr La Nena notifying her of all of the above and shared Sri Hernández's contact information in case she had further questions.     CAMRON called La Nena at 4:15pm and left a voicemail with the above information. CAMRON did not receive any communications today from Lakesha Harper at [facility].    CAMRON will continue to remain available for patient and family support, discharge planning, and access to resources.    Frederic SORTO, [facility]   Phone: [phone]

## 2021-07-13 NOTE — PLAN OF CARE
Discharge Planner Post-Acute Rehab OT:      Discharge Plan: SW working on placement with family for LTC facility.      Precautions: falls     Current Status:  ADLs: CGA-Min A sit <> stand with FWW, CGA-Min A ambulation with FWW and w/c follow d/t fatigue, Mod A LB dressing from EOB but min A when reclined in bed, SBA-Min A UB dressing, SBA bed mobility when given extra time.   IADLs: NT  Vision/Cognition: Pt oriented with short term memory deficits present.  Pt reports some difficulty reading as well     Assessment: Pt seen for light IADL (kitchen task) to promote increased standing tolerance, higher level balance, and IND with ADL performance. Facilitated pt in familiar coffee making task while standing at countertop with setup provided. CGA throughout and Mod A for management of FWW within kitchen. Pt demod sequencing of task appropriately without cuing. Next facilitated pt in loading items into  (higher level shelf d/t safety concern with squatting). Lastly, pt wiped down countertops including side stepping across counter and forward reaching with unilateral support of countertop and CGA. Pt took rest breaks for EC throughout. Pt progressing with activity tolerance and sequencing with familiar tasks. Cont with POC.     Other Barriers to Discharge (DME, Family Training, etc): Safety, family training.

## 2021-07-13 NOTE — PROGRESS NOTES
[facility] Transitional Care    Medicine Progress Note - Hospitalist Service       Date of Admission:  6/5/2021    Assessment & Plan           Assessment & Plan: Olga Bailey is a 75 year old woman with a history of glioblastoma multiforme s/p resection (2/23/21), HTN, GERD, asthma, anxiety and depression. She was initially admitted to [facility] from 4/16-5/15 for acute hypoxic respiratory failure 2/2 PJP pneumonia. She was discharged to  TCU but required readmission to [facility] on 5/26 for seizure activity. During her hospitalization her Keppra dose was increased, steroids were started, and she remained seizure free. She was transferred back to  TCU on 6/5 for ongoing rehabilitation.     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 Baker, Oncology as well as Dr. Spann, Radiation Oncology. Completed 15/15 sessions of radiation on 5/27, last saw rad onc 6/24 with plan to follow up as needed. Had breakthrough seizure 5/26 prompting inpatient admission. She was started on dexamethasone taper (completed 6/9), and Keppra dose was increased to 1250 mg BID. On 6/16, there was concern for possible recurrent seizure like activity as health psychologist reported witnessing the patient have 3 episodes where her eyes closed and she licked her lips during their 10 minute conversation. This had not been witnessed by staff prior to this. Discussed with neurology and patient had routine EEG on 6/16 without epileptiform discharges or seizures but with some mild abnormal slowing of background activity. During this period of time, medicine noticed patient being more sedated, therefore doxepin discontinued 6/15 and Seroquel 25 mg BID discontinued with evening dose decreased from 50 to 25 mg. Now mentation much improved. Suspect sedation was culprit. Has pronator drift to R arm at baseline, and chronically does  not get the year right after her tumor resection. MRI Brain 6/22 w/ interval increase in size of enhancing parenchymal nodule just superior to left parietal lobe resection cavity, most c/f disease recurrence/progression.     - Continue seizure precautions  - Continue Keppra 1250 mg BID  - Most recent Temodar (cycle 1)  x 5 doses (start date 6/24-6/28)   - Follow up with w/ Oncology / Dr. Baker is 7/20/21    Antibiotic prophylaxis  - Remains on Bactrim prophylaxis despite being off steroids. Per oncology telephone encounter in chart on 6/29 can stop Bactrim once ALC consistently > 0.5.     BLE DVTs (3/29/21) s/p IVC filter (4/16/21)  Right Retroperitoneal Hematoma s/p transfusion (4/14/21)  US 3/29/21 revealed BLE DVTs. CT negative for PE. On 4/14 developed spontaneous retroperitoneal bleed requiring 4 units PRBCs.      - Continue Xarelto 15 mg BID through 7/14, then start 20 mg every evening on 7/15.   - Has been referred to hematology by Dr. Baker for consideration of potential future IVC filter removal.      Mechanical Fall with posterior Scalp Hematoma and possible concussion (6/27/21)  Patient had a fall on 6/27 d/t loss of balance. Reported brief LOC. CT Head and C-spine negative for acute process. Xarelto was held 6/27-6/28 and then resumed. Is reporting increased nausea since the fall, but also just did course of chemotherapy and has been requiring potassium pills which can contribute to nausea. Denies dizziness or headache.     - Continue to monitor symptoms and appearance of hematoma.      Major depression with generalized anxiety disorder  Follows with psychiatry and psychology as outpatient, and health psych following here. Was started on Seroquel 25 mg BID + 50 mg at bedtime and doxepin 3 mg at bedtime in 4/2021 for anxiety/insomnia, which helped, but given concern for daytime sedation discontinued doxepin and decreased Seroquel to just 25 mg HS. Mood stable.   - Continue Buspar, Prozac, Seroquel.   -  Health Psychology following weekly at TCU    Chronic constipation  -Continue home regimen of Linzess 290 mcg once daily     Hypertension  - No acute issues. Continue home amlodipine 5mg once daily and Lasix 40mg once daily.      Hypokalemia secondary to diuretic use and chemo  -Continue with checking electrolytes MWF and repleting as needed  -Continue potassium 20Meq once daily     Psychiatry medications  -Continue with Seroquel 25mg at bedtime to help with insomnia     Diet: Room Service  Combination Diet Regular Diet  Snacks/Supplements Adult: Other; Any supplement PRN; Between Meals    DVT Prophylaxis: Xarelto and IVC filter  Martino Catheter: Not present  Central Lines: None  Code Status: Full Code    Indication for Psychiatry medications: up to date  Pneumococcal vaccination status: Seroquel used at bedtime prn for sleep, Buspar, Prozac used for depression  Expected discharge: She was originally going to [facility], but now is not able to meet that deadline and her discharge is TBD      TERA Shepherd  Hospitalist Service  [facility] Transitional [facility]  Securely message with the Vocera Web Console (learn more here)  Text page via [employer] Paging/Directory      Interval History   Ms. Bailey was resting in bed this morning in good spirits. She is making progress with PT/OT, albeit slower than she would like. We discussed her treatment plan, discharge plan and she had no further questions or concerns. She denies any chest pain, shortness of breath, fevers, chills or additional concerns today.     Data reviewed today: I reviewed all medications, new labs and imaging results over the last 24 hours.     Physical Exam   Vital Signs: Temp: 97.8  F (36.6  C) Temp src: Oral BP: 126/72 Pulse: 72   Resp: 16 SpO2: 93 % O2 Device: None (Room air)    Weight: 152 lbs 0 oz  General Appearance: 75 year old female resting comfortably at the edge of her bed, in good spirits, denies pain at present  Respiratory: breathing  comfortably on room air, no adventitious sounds to bilateral auscultation  Cardiovascular: regular rate and rhythm, no appreciable murmurs, rubs or gallops  GI: tinkering bowel sounds present, soft, non-tender to palpation throughout, no rebound or guarding  Skin: warm, dry, no open sores, lesions or ulcerations  Psych: alert, oriented to name, date, hospital and recent events, not able to remember the year    Data   Recent Labs   Lab 07/13/21  0825 07/12/21  0854 07/09/21  2149 07/09/21  0748 07/08/21  1243   WBC  --  6.2  --  3.8* 5.5   HGB  --  11.0*  --  10.7* 11.7   MCV  --  96  --  96 93   PLT  --  126*  --  200 210   NA  --  140  --  139 138   POTASSIUM 3.3* 3.2* 3.7 3.3* 3.9   CHLORIDE  --  108  --  109 107   CO2  --  27  --  25 22   BUN  --  16  --  10 10   CR  --  0.60  --  0.55 0.53   ANIONGAP  --  5  --  5 9   ESTIVEN  --  9.4  --  9.1 9.2   GLC  --  96  --  92 97

## 2021-07-13 NOTE — PLAN OF CARE
"  VS: /72 (BP Location: Right arm)   Pulse 72   Temp 97.8  F (36.6  C) (Oral)   Resp 16   Ht 1.6 m (5' 2.99\")   Wt 68.9 kg (152 lb)   SpO2 93%   BMI 26.93 kg/m    Patient is A&O x 4, LS clear, BS active   O2: 93% at RA   Output: Incont with a pad, also wears a pure wick   Last BM: 7/13   Activity: Up with SBA using a walker   Skin: Skin tear on Rt shin LE, covered with mepilex   Pain: Denies pain   CMS: intact   Dressing: CD&I   Diet: Regular diet   LDA: None   Equipment: walker   Plan: Continue with POC   Additional Info: Seizure precaution.  K+ was 3.3 replaced with oral Potassium 40 meq, will recheck lab at 1545.         Patient's most recent vital signs are:     Vital signs:  BP: 126/72  Temp: 97.8  HR: 72  RR: 16  SpO2: 93 %     Patient does not have new respiratory symptoms.  Patient does not have new sore throat.  Patient does not have a fever greater than 99.5.         "

## 2021-07-14 ENCOUNTER — APPOINTMENT (OUTPATIENT)
Dept: OCCUPATIONAL THERAPY | Facility: SKILLED NURSING FACILITY | Age: 76
DRG: 055 | End: 2021-07-14
Attending: INTERNAL MEDICINE
Payer: MEDICARE

## 2021-07-14 ENCOUNTER — APPOINTMENT (OUTPATIENT)
Dept: PHYSICAL THERAPY | Facility: SKILLED NURSING FACILITY | Age: 76
DRG: 055 | End: 2021-07-14
Attending: INTERNAL MEDICINE
Payer: MEDICARE

## 2021-07-14 LAB
ANION GAP SERPL CALCULATED.3IONS-SCNC: 5 MMOL/L (ref 3–14)
BASOPHILS # BLD AUTO: 0 10E3/UL (ref 0–0.2)
BASOPHILS # BLD AUTO: 0 10E3/UL (ref 0–0.2)
BASOPHILS NFR BLD AUTO: 0 %
BASOPHILS NFR BLD AUTO: 0 %
BUN SERPL-MCNC: 20 MG/DL (ref 7–30)
BURR CELLS BLD QL SMEAR: SLIGHT
CALCIUM SERPL-MCNC: 9.3 MG/DL (ref 8.5–10.1)
CHLORIDE BLD-SCNC: 109 MMOL/L (ref 94–109)
CO2 SERPL-SCNC: 25 MMOL/L (ref 20–32)
CREAT SERPL-MCNC: 0.64 MG/DL (ref 0.52–1.04)
EOSINOPHIL # BLD AUTO: 0.5 10E3/UL (ref 0–0.7)
EOSINOPHIL # BLD AUTO: 0.5 10E3/UL (ref 0–0.7)
EOSINOPHIL NFR BLD AUTO: 11 %
EOSINOPHIL NFR BLD AUTO: 16 %
ERYTHROCYTE [DISTWIDTH] IN BLOOD BY AUTOMATED COUNT: 14.5 % (ref 10–15)
GFR SERPL CREATININE-BSD FRML MDRD: 88 ML/MIN/1.73M2
GLUCOSE BLD-MCNC: 91 MG/DL (ref 70–99)
HCT VFR BLD AUTO: 32.4 % (ref 35–47)
HCT VFR BLD AUTO: 32.7 % (ref 35–47)
HCT VFR BLD AUTO: 33.2 % (ref 35–47)
HGB BLD-MCNC: 10.5 G/DL (ref 11.7–15.7)
HGB BLD-MCNC: 10.5 G/DL (ref 11.7–15.7)
HGB BLD-MCNC: 10.6 G/DL (ref 11.7–15.7)
HOLD SPECIMEN: NORMAL
IMM GRANULOCYTES # BLD: 0 10E3/UL
IMM GRANULOCYTES # BLD: 0 10E3/UL
IMM GRANULOCYTES NFR BLD: 0 %
IMM GRANULOCYTES NFR BLD: 0 %
LDH SERPL L TO P-CCNC: 228 U/L (ref 81–234)
LYMPHOCYTES # BLD AUTO: 0.5 10E3/UL (ref 0.8–5.3)
LYMPHOCYTES # BLD AUTO: 0.5 10E3/UL (ref 0.8–5.3)
LYMPHOCYTES NFR BLD AUTO: 11 %
LYMPHOCYTES NFR BLD AUTO: 14 %
MCH RBC QN AUTO: 30.5 PG (ref 26.5–33)
MCH RBC QN AUTO: 30.6 PG (ref 26.5–33)
MCH RBC QN AUTO: 30.7 PG (ref 26.5–33)
MCHC RBC AUTO-ENTMCNC: 31.9 G/DL (ref 31.5–36.5)
MCHC RBC AUTO-ENTMCNC: 32.1 G/DL (ref 31.5–36.5)
MCHC RBC AUTO-ENTMCNC: 32.4 G/DL (ref 31.5–36.5)
MCV RBC AUTO: 95 FL (ref 78–100)
MCV RBC AUTO: 95 FL (ref 78–100)
MCV RBC AUTO: 96 FL (ref 78–100)
MONOCYTES # BLD AUTO: 0.4 10E3/UL (ref 0–1.3)
MONOCYTES # BLD AUTO: 0.4 10E3/UL (ref 0–1.3)
MONOCYTES NFR BLD AUTO: 11 %
MONOCYTES NFR BLD AUTO: 8 %
NEUTROPHILS # BLD AUTO: 2 10E3/UL (ref 1.6–8.3)
NEUTROPHILS # BLD AUTO: 3.5 10E3/UL (ref 1.6–8.3)
NEUTROPHILS NFR BLD AUTO: 59 %
NEUTROPHILS NFR BLD AUTO: 70 %
NRBC # BLD AUTO: 0 10E3/UL
NRBC # BLD AUTO: 0 10E3/UL
NRBC BLD AUTO-RTO: 0 /100
NRBC BLD AUTO-RTO: 0 /100
PLAT MORPH BLD: ABNORMAL
PLATELET # BLD AUTO: 61 10E3/UL (ref 150–450)
PLATELET # BLD AUTO: 61 10E3/UL (ref 150–450)
PLATELET # BLD AUTO: 68 10E3/UL (ref 150–450)
POTASSIUM BLD-SCNC: 3.4 MMOL/L (ref 3.4–5.3)
POTASSIUM BLD-SCNC: 3.6 MMOL/L (ref 3.4–5.3)
RBC # BLD AUTO: 3.42 10E6/UL (ref 3.8–5.2)
RBC # BLD AUTO: 3.43 10E6/UL (ref 3.8–5.2)
RBC # BLD AUTO: 3.47 10E6/UL (ref 3.8–5.2)
RBC MORPH BLD: ABNORMAL
RETICS # AUTO: 0.01 10E6/UL (ref 0.03–0.1)
RETICS/RBC NFR AUTO: 0.3 % (ref 0.5–2)
SODIUM SERPL-SCNC: 139 MMOL/L (ref 133–144)
WBC # BLD AUTO: 3.3 10E3/UL (ref 4–11)
WBC # BLD AUTO: 5 10E3/UL (ref 4–11)
WBC # BLD AUTO: 5.1 10E3/UL (ref 4–11)

## 2021-07-14 PROCEDURE — 250N000013 HC RX MED GY IP 250 OP 250 PS 637: Performed by: PHYSICIAN ASSISTANT

## 2021-07-14 PROCEDURE — 250N000013 HC RX MED GY IP 250 OP 250 PS 637: Performed by: INTERNAL MEDICINE

## 2021-07-14 PROCEDURE — 83615 LACTATE (LD) (LDH) ENZYME: CPT | Performed by: PHYSICIAN ASSISTANT

## 2021-07-14 PROCEDURE — 36415 COLL VENOUS BLD VENIPUNCTURE: CPT | Performed by: INTERNAL MEDICINE

## 2021-07-14 PROCEDURE — 84132 ASSAY OF SERUM POTASSIUM: CPT | Performed by: INTERNAL MEDICINE

## 2021-07-14 PROCEDURE — 85045 AUTOMATED RETICULOCYTE COUNT: CPT | Performed by: PHYSICIAN ASSISTANT

## 2021-07-14 PROCEDURE — 80048 BASIC METABOLIC PNL TOTAL CA: CPT | Performed by: PHYSICIAN ASSISTANT

## 2021-07-14 PROCEDURE — 97535 SELF CARE MNGMENT TRAINING: CPT | Mod: GO

## 2021-07-14 PROCEDURE — 85014 HEMATOCRIT: CPT | Performed by: PHYSICIAN ASSISTANT

## 2021-07-14 PROCEDURE — 97110 THERAPEUTIC EXERCISES: CPT | Mod: GO

## 2021-07-14 PROCEDURE — 97530 THERAPEUTIC ACTIVITIES: CPT | Mod: GP

## 2021-07-14 PROCEDURE — 36415 COLL VENOUS BLD VENIPUNCTURE: CPT | Performed by: PHYSICIAN ASSISTANT

## 2021-07-14 PROCEDURE — 120N000009 HC R&B SNF

## 2021-07-14 PROCEDURE — 90837 PSYTX W PT 60 MINUTES: CPT | Performed by: PSYCHOLOGIST

## 2021-07-14 PROCEDURE — 97110 THERAPEUTIC EXERCISES: CPT | Mod: GP

## 2021-07-14 PROCEDURE — 99207 PR NO CHARGE LOS: CPT | Performed by: PHYSICIAN ASSISTANT

## 2021-07-14 PROCEDURE — 85025 COMPLETE CBC W/AUTO DIFF WBC: CPT | Performed by: PHYSICIAN ASSISTANT

## 2021-07-14 PROCEDURE — 97116 GAIT TRAINING THERAPY: CPT | Mod: GP

## 2021-07-14 RX ORDER — POTASSIUM CHLORIDE 1.5 G/1.58G
40 POWDER, FOR SOLUTION ORAL ONCE
Status: COMPLETED | OUTPATIENT
Start: 2021-07-14 | End: 2021-07-14

## 2021-07-14 RX ORDER — POTASSIUM CHLORIDE 1.5 G/1.58G
40 POWDER, FOR SOLUTION ORAL DAILY
Status: DISCONTINUED | OUTPATIENT
Start: 2021-07-15 | End: 2021-07-15 | Stop reason: HOSPADM

## 2021-07-14 RX ADMIN — LEVETIRACETAM 1250 MG: 750 TABLET, FILM COATED ORAL at 21:06

## 2021-07-14 RX ADMIN — FLUOXETINE HYDROCHLORIDE 60 MG: 20 CAPSULE ORAL at 08:10

## 2021-07-14 RX ADMIN — BUSPIRONE HYDROCHLORIDE 30 MG: 15 TABLET ORAL at 08:10

## 2021-07-14 RX ADMIN — PANTOPRAZOLE SODIUM 40 MG: 40 TABLET, DELAYED RELEASE ORAL at 17:03

## 2021-07-14 RX ADMIN — FUROSEMIDE 40 MG: 40 TABLET ORAL at 08:09

## 2021-07-14 RX ADMIN — CALCIUM POLYCARBOPHIL 625 MG: 625 TABLET, FILM COATED ORAL at 08:10

## 2021-07-14 RX ADMIN — BUSPIRONE HYDROCHLORIDE 30 MG: 15 TABLET ORAL at 21:06

## 2021-07-14 RX ADMIN — RIVAROXABAN 15 MG: 15 TABLET, FILM COATED ORAL at 08:10

## 2021-07-14 RX ADMIN — SULFAMETHOXAZOLE AND TRIMETHOPRIM 1 TABLET: 400; 80 TABLET ORAL at 08:10

## 2021-07-14 RX ADMIN — LEVETIRACETAM 1250 MG: 750 TABLET, FILM COATED ORAL at 08:10

## 2021-07-14 RX ADMIN — QUETIAPINE 25 MG: 25 TABLET ORAL at 21:06

## 2021-07-14 RX ADMIN — POTASSIUM CHLORIDE 40 MEQ: 1.5 FOR SOLUTION ORAL at 10:22

## 2021-07-14 RX ADMIN — AMLODIPINE BESYLATE 5 MG: 5 TABLET ORAL at 08:09

## 2021-07-14 RX ADMIN — PANTOPRAZOLE SODIUM 40 MG: 40 TABLET, DELAYED RELEASE ORAL at 06:28

## 2021-07-14 RX ADMIN — LINACLOTIDE 290 MCG: 145 CAPSULE, GELATIN COATED ORAL at 06:28

## 2021-07-14 RX ADMIN — POTASSIUM CHLORIDE 20 MEQ: 1.5 FOR SOLUTION ORAL at 08:10

## 2021-07-14 NOTE — PLAN OF CARE
"  VS: Blood pressure 122/67, pulse 61, temperature 96.5  F (35.8  C), temperature source Oral, resp. rate 16, height 1.6 m (5' 2.99\"), weight 68.9 kg (152 lb), SpO2 96 %.     O2: Room air, pt denies SOB.    Output: Ambulates to toilet during day, uses purewick at night. New purewick placed around 2030.   Last BM: 7/13.   Activity: Assist x1 with walker and gait belt.   Skin: Skin tear on R leg, unable to do a posterior skin assessment.   Pain: Denies pain, PRN Tylenol given prophylactic per pt request for usual headache at bedtime.    CMS: Denies N/T.   Dressing: Mepilex over R leg skin tear CDI.   Diet: Reg, thin liquid.   LDA: Skin tear, external cath.   Equipment: Walker, gait belt, suction, purewick.   Plan: Continue with plan of care.   Additional Info: Pt alert and oriented x4, pleasant. Pt able to make needs known, in bed with call light in reach at all times.       "
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
## 2021-07-14 NOTE — PROGRESS NOTES
Ms. Bailey's platelets dropped to 68,000 from normal in the past week. She has no signs or symptoms of bleeding on exam. Her hemoglobin is stable, she is hemodynamically stable, afebrile with no additional concerns today. Per our discussion, we will plan for the following:    -Stop Xarelto for now  -Recheck CBC daily  -Check morphology to rule out hemolysis, check LDH  -Patient has not received heparin recently but has been on Xarelto since the end of the June     TERA Shepherd

## 2021-07-14 NOTE — PLAN OF CARE
Patient's most recent vital signs are:     Vital signs:  BP: 114/59  Temp: 96.7  HR: 85  RR: 16  SpO2: 93 %     Patient does not have new respiratory symptoms.  Patient does not have new sore throat.  Patient does not have a fever greater than 99.5.    Alert and verbally expresses her needs. Participating in therapies. Appetite good. Up in chair for lunch. Ambulates with walker and one assist. Uses Purewick external catheter at night. Daughter in this afternoon. There is some confusion regarding discharge to LTC facility. Daughter talked with SW. Tentative discharge date is 7/15/21 or 7/19/21.

## 2021-07-14 NOTE — PROGRESS NOTES
Transitional Care Rounds Note    Date:   7/14/21  Unit Day:   39   Diagnosis:   Final diagnoses:   Physical deconditioning   Glioblastoma -- S/P Surg Resection 2/23/21   GBM (glioblastoma multiforme) (H)   Recurrent seizures (H)   Pyelonephritis   Primary HSV infection of mouth   Dry eyes   Other dysphagia   Hyponatremia   Gastroesophageal reflux disease without esophagitis   Other impaction of intestine (H)   Thrombocytopenia (H)   Acute respiratory failure with hypoxia (H)   Hypoxia   Anxiety   Essential hypertension   CARDIOVASCULAR SCREENING; LDL GOAL LESS THAN 160   Adjustment disorder with depressed mood   Need for prophylactic hormone replacement therapy (postmenopausal)   Chronic obstructive pulmonary disease, unspecified COPD type (H)      Code Status:Full Code     Current mobility: variable - CGA to max assist.  Therapy Goal: increased independence  Discharge Equipment: walker  Discharge services needed: NA - pt discharging to facility.    Purpose:   Daily rounds    Attendance:       Treatment Team:   CAMRON, RNCC, MDS, PT/OT, providers, dieticians, discharge pharm, nursing    Discussion:       Medical Overview and Update:   PA managing nausea - increasing PRNs.      Patient/Family Questions and Concerns:   Pt's family very involved in plan of care. The only people that should be receiving information are: La Nena Bishop Damon and Alexsandra      Treatment Team Recommendations:   NA    Follow-Up Plan:       Teaching Needs     NA    Conclusion:     Discharging to: [facility] continues to work on placement.      Taryn Camacho RN .................... 7/14/21

## 2021-07-14 NOTE — PROGRESS NOTES
"MDS Notice of Medicare Non-Coverage Note:     Met with patient to Introduce self and role.      Discussed Notice of Medicare Non-Coverage and last day of coverage as required for all patients with Medicare coverage during Skilled Nursing Facility (SNF) stays. Last coverage day is 7/17/21. Last day of therapy is 7/17/21.     Patients expected discharge is 7/15/21. She is aware of discharge and states that her family has been informed as well. When asked if she needed this author to call her daughter, she stated that was not necessary. She states the SW has kept her informed of the discharge plans and date has been on the white board in her room for \" a while\".    Opportunity provided for questions and education provided regarding potential financial impact to patient. Copy of [employer] form provided to patient.     Patient verbalizes understanding of discussion.     Gabriela Morgan RN, BSN  MDS Quality and     "

## 2021-07-14 NOTE — PLAN OF CARE
Physical Therapy Discharge Summary    Reason for therapy discharge:    Discharged to long term care facility.    Progress towards therapy goal(s). See goals on Care Plan in [facility] electronic health record for goal details.  Goals partially met.  Barriers to achieving goals:   discharge from facility.    Therapy recommendation(s):    Continued therapy is recommended.  Rationale/Recommendations:  HH PT at LTC. During stay, pt has progress to CGA to min-A for transfers and mobility with use of FWW up to 200'. Balance can fluctuate with occasional retropulsion.

## 2021-07-14 NOTE — PLAN OF CARE
Discharge Planner Post-Acute Rehab OT:      Discharge Plan: SW working on placement with family for LTC facility.      Precautions: falls     Current Status:  ADLs: CGA-Min A sit <> stand with FWW, CGA-Min A ambulation with FWW and w/c follow d/t fatigue, Mod A LB dressing from EOB but min A when reclined in bed, SBA-Min A UB dressing, SBA bed mobility when given extra time.   IADLs: NT  Vision/Cognition: Pt oriented with short term memory deficits present.  Pt reports some difficulty reading as well     Assessment: Pt seen for shower assessment d/t supposed to be last day of therapy although discharge plan is now pending d/t waiting for placement. Pt completed shower, toileting, full body dressing, and functional mobility with FWW. Pt overall did very well today requiring decreased assist versus previous days. Pt tolerated UB strengthening exercise following shower, tolerating approx 80 minutes of activity and requested to sit up in armchair following session. Cont with POC.     Other Barriers to Discharge (DME, Family Training, etc): Safety, family training.

## 2021-07-15 ENCOUNTER — LAB REQUISITION (OUTPATIENT)
Dept: LAB | Facility: CLINIC | Age: 76
End: 2021-07-15
Payer: MEDICARE

## 2021-07-15 ENCOUNTER — DOCUMENTATION ONLY (OUTPATIENT)
Dept: PHARMACY | Facility: CLINIC | Age: 76
End: 2021-07-15

## 2021-07-15 VITALS
HEIGHT: 63 IN | BODY MASS INDEX: 26.93 KG/M2 | DIASTOLIC BLOOD PRESSURE: 76 MMHG | WEIGHT: 152 LBS | OXYGEN SATURATION: 94 % | RESPIRATION RATE: 16 BRPM | SYSTOLIC BLOOD PRESSURE: 114 MMHG | HEART RATE: 87 BPM | TEMPERATURE: 97.2 F

## 2021-07-15 DIAGNOSIS — Z11.7 ENCOUNTER FOR TESTING FOR LATENT TUBERCULOSIS INFECTION: ICD-10-CM

## 2021-07-15 LAB
BASOPHILS # BLD AUTO: 0 10E3/UL (ref 0–0.2)
BASOPHILS NFR BLD AUTO: 0 %
EOSINOPHIL # BLD AUTO: 0.4 10E3/UL (ref 0–0.7)
EOSINOPHIL NFR BLD AUTO: 14 %
ERYTHROCYTE [DISTWIDTH] IN BLOOD BY AUTOMATED COUNT: 14.3 % (ref 10–15)
HCT VFR BLD AUTO: 32.3 % (ref 35–47)
HGB BLD-MCNC: 10.3 G/DL (ref 11.7–15.7)
IMM GRANULOCYTES # BLD: 0 10E3/UL
IMM GRANULOCYTES NFR BLD: 0 %
LYMPHOCYTES # BLD AUTO: 0.4 10E3/UL (ref 0.8–5.3)
LYMPHOCYTES NFR BLD AUTO: 13 %
MCH RBC QN AUTO: 30.4 PG (ref 26.5–33)
MCHC RBC AUTO-ENTMCNC: 31.9 G/DL (ref 31.5–36.5)
MCV RBC AUTO: 95 FL (ref 78–100)
MONOCYTES # BLD AUTO: 0.3 10E3/UL (ref 0–1.3)
MONOCYTES NFR BLD AUTO: 11 %
NEUTROPHILS # BLD AUTO: 1.9 10E3/UL (ref 1.6–8.3)
NEUTROPHILS NFR BLD AUTO: 62 %
NRBC # BLD AUTO: 0 10E3/UL
NRBC BLD AUTO-RTO: 0 /100
PATH REPORT.COMMENTS IMP SPEC: NORMAL
PATH REPORT.COMMENTS IMP SPEC: NORMAL
PATH REPORT.FINAL DX SPEC: NORMAL
PATH REPORT.MICROSCOPIC SPEC OTHER STN: NORMAL
PATH REPORT.MICROSCOPIC SPEC OTHER STN: NORMAL
PATH REPORT.RELEVANT HX SPEC: NORMAL
PLATELET # BLD AUTO: 45 10E3/UL (ref 150–450)
POTASSIUM BLD-SCNC: 3.9 MMOL/L (ref 3.4–5.3)
RBC # BLD AUTO: 3.39 10E6/UL (ref 3.8–5.2)
RETICS # AUTO: 0.01 10E6/UL (ref 0.03–0.1)
RETICS/RBC NFR AUTO: 0.4 % (ref 0.5–2)
WBC # BLD AUTO: 3.1 10E3/UL (ref 4–11)

## 2021-07-15 PROCEDURE — 85025 COMPLETE CBC W/AUTO DIFF WBC: CPT | Performed by: INTERNAL MEDICINE

## 2021-07-15 PROCEDURE — 250N000013 HC RX MED GY IP 250 OP 250 PS 637: Performed by: PHYSICIAN ASSISTANT

## 2021-07-15 PROCEDURE — 85060 BLOOD SMEAR INTERPRETATION: CPT | Performed by: PATHOLOGY

## 2021-07-15 PROCEDURE — 99207 PR CDG-CODE CATEGORY CHANGED: CPT | Performed by: NURSE PRACTITIONER

## 2021-07-15 PROCEDURE — 99316 NF DSCHRG MGMT 30 MIN+: CPT | Performed by: NURSE PRACTITIONER

## 2021-07-15 PROCEDURE — 84132 ASSAY OF SERUM POTASSIUM: CPT | Performed by: PHYSICIAN ASSISTANT

## 2021-07-15 PROCEDURE — 85045 AUTOMATED RETICULOCYTE COUNT: CPT | Performed by: INTERNAL MEDICINE

## 2021-07-15 PROCEDURE — 36415 COLL VENOUS BLD VENIPUNCTURE: CPT | Performed by: PHYSICIAN ASSISTANT

## 2021-07-15 RX ORDER — DOCUSATE SODIUM 100 MG/1
100 CAPSULE, LIQUID FILLED ORAL 2 TIMES DAILY PRN
DISCHARGE
Start: 2021-07-15 | End: 2021-12-22

## 2021-07-15 RX ORDER — POTASSIUM CHLORIDE 1.5 G/1.58G
40 POWDER, FOR SOLUTION ORAL DAILY
DISCHARGE
Start: 2021-07-16 | End: 2021-07-30

## 2021-07-15 RX ADMIN — CALCIUM POLYCARBOPHIL 625 MG: 625 TABLET, FILM COATED ORAL at 08:30

## 2021-07-15 RX ADMIN — FUROSEMIDE 40 MG: 40 TABLET ORAL at 08:29

## 2021-07-15 RX ADMIN — BUSPIRONE HYDROCHLORIDE 30 MG: 15 TABLET ORAL at 08:30

## 2021-07-15 RX ADMIN — SULFAMETHOXAZOLE AND TRIMETHOPRIM 1 TABLET: 400; 80 TABLET ORAL at 08:30

## 2021-07-15 RX ADMIN — AMLODIPINE BESYLATE 5 MG: 5 TABLET ORAL at 08:30

## 2021-07-15 RX ADMIN — PANTOPRAZOLE SODIUM 40 MG: 40 TABLET, DELAYED RELEASE ORAL at 06:38

## 2021-07-15 RX ADMIN — LEVETIRACETAM 1250 MG: 750 TABLET, FILM COATED ORAL at 08:29

## 2021-07-15 RX ADMIN — LINACLOTIDE 290 MCG: 145 CAPSULE, GELATIN COATED ORAL at 06:38

## 2021-07-15 RX ADMIN — POTASSIUM CHLORIDE 40 MEQ: 1.5 FOR SOLUTION ORAL at 08:30

## 2021-07-15 RX ADMIN — FLUOXETINE HYDROCHLORIDE 60 MG: 20 CAPSULE ORAL at 08:29

## 2021-07-15 NOTE — PLAN OF CARE
RN: Pt has no c/o pain or discomfort so far this shift. Purewick in place and functioning accordingly. Bed alarm maintained. Pt has no c/o SOB and no s/s of respiratory issue noted at RA. Appear to be sleeping/resting between cares/ meds. Continue with plan of care.    Patient's most recent vital signs are:     Vital signs:  BP: 112/72  Temp: 97.3  HR: 94  RR: 16  SpO2: 93 %     Patient does not have new respiratory symptoms.  Patient does not have new sore throat.  Patient does not have a fever greater than 99.5.

## 2021-07-15 NOTE — PROGRESS NOTES
SW discussed pt's discharge plan with pt and her daughter, La Nena in the pt's room. SW answered their questions related to Medicare coverage, NOMNOC notice, and continuing PT and OT at a community SNF. SW reviewed that [facility] is able to accept pt on 7/15 and that they are unable to hold the room for the pt after 7/15. La Nena attempted to call Sri Hernández, [facility] admissions coordinator, late this afternoon, to review financial details, but Sri had left for the day. SW had also attempted to reach Sri at 3:50pm and left her a voicemail.    SW informed pt and La Nena that pt would be responsible for TCU costs after PT and OT discharge Olga. Last day of therapy is scheduled for Saturday, 7/17. Pt and dtr expressed understanding. SW reviewed that out-of-pocket costs for [facility]CU are typically higher than private-pay rates at community SNFs.     Pt and dtr discussed their perspectives with each other. La Nena asked Olga if she felt that she was at her new baseline with regards to mobility, and pt said yes. La Nena asked Olga if she wanted to go through the discharge appeal process with Medicare at this time. Pt said no.     La Nena stated she would call Sri Hernández to discuss financial details first thing in the morning; according to La Nena, Sri had told her on 7/13 that she arrives at work at 5am. Pt stated to CAMRON that it was her desire to have La Nena get all financial details before agreeing to discharge to [facility]. SW urged La Nena to call me as soon as possible on 7/15.     Frederic SORTO, [facility]  TCU   Phone: [phone]

## 2021-07-15 NOTE — PROGRESS NOTES
Pt agreeable to discharge to [facility]. SW notified TCU team and Sri Hernández at [facility]. Pt agreeable to SW arranging transportation; pt aware of out-of-pocket cost.    SW scheduled wheelchair van transportation with [facility] [place] transportation services. Per dispatch, they are sending an ambulance d/t limited availability, but pt will only be charged for wheelchair van rate. Pt must be ready to leave at 2:30pm.    SW will continue to remain available for patient and family support, discharge planning, and access to resources.    Frederic SORTO, [facility]  TCU   Phone: [phone]

## 2021-07-15 NOTE — PROGRESS NOTES
MDS Pain Assessment    The following is the pain interview as conducted by the TCU RN caring for the patient on July / 14 / 2021. This assessment is required by the [facility] for all patients in [place] SNF (Skilled Nursing Facilities).       1. Have you had pain or hurting at any time in the last 5 days? No    2. How much of the time have you experienced pain or hurting over the last 5 days? not applicable    3. Over the past 5 days, has pain made it hard for you to sleep at night? Not applicable    4. Over the past 5 days, have you limited your day-to-day activities because of pain? Not applicable    5. Pain intensity (Numeric scale used first-if patient unable to answer, verbal scale to be used.)    Numeric Scale: Please rate your worst pain over the last 5 days on a zero to ten scale, with zero being no pain and ten being the worst pain you can imagine.     not applicable    Verbal Scale: Please rate the intensity of your worst pain over the last 5 days.     not applicable    Gabriela Morgan RN, BSN  MDS Quality and      [address] [address]  [email or web address]  [email or web address]   Office:[phone]  Gender pronouns: she/her

## 2021-07-15 NOTE — PROGRESS NOTES
Oral Chemotherapy Monitoring Program.    Patient currently on adjuvant temozolomide therapy. C1D1 = 6/24/21    Reviewed labs from 7/15/21. Results are concerning for thrombocytopenia. Holding blood thinner until plt >50k.    Pharmacist followed up with both TCU RN and daughter on C1.  Her bowels seemed to be managed well at the TCU with no issues there. There was some fatigue, but that appears to be ongoing and not necessarily new. She had a little breakthrough nausea in the mornings, but the nurse also said this happened intermittently in general. In general, they feel like she tolerated C1 (started on 6/24/21).    She has supply for C2 (250mg x 5) that will be sent with her to [facility] today with a note saying: do not administer until ok given from Dr. Baker's team. She has this supply because we found out too late from the TCU that she was unable to use her own supply and they actually had to fill C1 for her there.     While it seems like she felt ok with C1, her platelets have trended down. She is seeing Dr. Baker on Tuesday. I can reach out to [facility] next week to review temozolomide + supportive medications with them once we know more about labs and if it is ok to proceed.     Connect with Louise WEBSTER if there are changes to plan and if orders for bowel/supportive meds need to be sent to [facility].    Follow up on 7/20/21    Brigid Mcintyre PharmD  July 15, 2021

## 2021-07-15 NOTE — PROGRESS NOTES
SPIRITUAL HEALTH SERVICES  SPIRITUAL ASSESSMENT Progress Note  [facility] TCU R423 7/15     REFERRAL SOURCE: Follow Up    Pt mentioned he was doing great. She was having lunch and happy to visit with Unit . Pt seemed very peaceful. The note from white board states she will discharge soon. Pt have been prayed for multiple by Chaplains for her recovery and pain.    PLAN: Will follow up with pt if she returns to unit.    Giovanna Drew  Associate    Pager: [phone]

## 2021-07-15 NOTE — PLAN OF CARE
"Patient is AxOx4. Patient received meds whole with thin liquids. Had no c/o pain or anxiety. Patient is incontinent and uses purewick cath. No BMs noted on shift. PT uses call light appropriately and can vocalize needs. R shin wound care completed; dry, clean, and intact. Labs taken on shift due to drop in platelet count; waiting on morphology. Patient did not get out of bed during shift or ambulate; education provided about ambulation.     /72 (BP Location: Right arm)   Pulse 94   Temp 97.3  F (36.3  C) (Oral)   Resp 16   Ht 1.6 m (5' 2.99\")   Wt 68.9 kg (152 lb)   SpO2 93%   BMI 26.93 kg/m       Patient does not have new respiratory symptoms.  Patient does not have new sore throat.  Patient does not have a fever greater than 99.5.  "

## 2021-07-15 NOTE — PLAN OF CARE
RN: lab called critical value platelets 45, provider paged for update.  Continue to monitor  No platelets replaced, pt transferring to [facility].  [facility]  at this time via stretcher as wheel chair ride that was arranged not available at time of transfer.   Pt in good spirits, phone and  and belongings with pt. Transfer papers with pt.  Meds from security with pt. No complaints of pain and no resp distress.  Pt ate lunch prior to transfer. Assist of one     Patient's most recent vital signs are:     Vital signs:  BP: 114/76  Temp: 97.2  HR: 87  RR: 16  SpO2: 94 %     Patient does not have new respiratory symptoms.  Patient does not have new sore throat.  Patient does not have a fever greater than 99.5.       with belt and walker.

## 2021-07-15 NOTE — PLAN OF CARE
Occupational Therapy Discharge Summary    Reason for therapy discharge:    Pt discharging to [facility] today.    Progress towards therapy goal(s). See goals on Care Plan in [facility] electronic health record for goal details.  Goals partially met.  Barriers to achieving goals:   discharge from facility.    Therapy recommendation(s):    Continued therapy is recommended.  Rationale/Recommendations:  Pt has made slow but significant progress over the course of her TCU stay.  She is a good candidate to con't skilled OT as she is motivated and performing below her baseline. She benefits from assist of one for ADLs and mobility and is pleasant and goal directed.

## 2021-07-16 ENCOUNTER — TELEPHONE (OUTPATIENT)
Dept: PULMONOLOGY | Facility: CLINIC | Age: 76
End: 2021-07-16

## 2021-07-16 PROCEDURE — 36415 COLL VENOUS BLD VENIPUNCTURE: CPT | Performed by: NURSE PRACTITIONER

## 2021-07-16 PROCEDURE — 86481 TB AG RESPONSE T-CELL SUSP: CPT | Performed by: NURSE PRACTITIONER

## 2021-07-16 PROCEDURE — 36415 COLL VENOUS BLD VENIPUNCTURE: CPT | Mod: ORL | Performed by: NURSE PRACTITIONER

## 2021-07-16 NOTE — TELEPHONE ENCOUNTER
LVM with direct call back to schedule 2-3 month follow up post hospital discharge per Dr. Egan with additional testing. Requested call back to arrange an appt some time in early Sept.

## 2021-07-18 ENCOUNTER — LAB REQUISITION (OUTPATIENT)
Dept: LAB | Facility: CLINIC | Age: 76
End: 2021-07-18
Payer: MEDICARE

## 2021-07-18 DIAGNOSIS — C71.9 MALIGNANT NEOPLASM OF BRAIN, UNSPECIFIED (H): ICD-10-CM

## 2021-07-18 LAB
QUANTIFERON MITOGEN: 2.9 IU/ML
QUANTIFERON NIL TUBE: 0.02 IU/ML
QUANTIFERON TB1 TUBE: 0.07 IU/ML
QUANTIFERON TB2 TUBE: 0.04

## 2021-07-19 ENCOUNTER — NURSING HOME VISIT (OUTPATIENT)
Dept: GERIATRICS | Facility: CLINIC | Age: 76
End: 2021-07-19
Payer: MEDICARE

## 2021-07-19 ENCOUNTER — LAB REQUISITION (OUTPATIENT)
Dept: LAB | Facility: CLINIC | Age: 76
End: 2021-07-19
Payer: MEDICARE

## 2021-07-19 ENCOUNTER — DOCUMENTATION ONLY (OUTPATIENT)
Dept: OTHER | Facility: CLINIC | Age: 76
End: 2021-07-19

## 2021-07-19 VITALS
BODY MASS INDEX: 26.54 KG/M2 | TEMPERATURE: 97.7 F | HEIGHT: 62 IN | HEART RATE: 78 BPM | RESPIRATION RATE: 16 BRPM | SYSTOLIC BLOOD PRESSURE: 132 MMHG | WEIGHT: 144.2 LBS | OXYGEN SATURATION: 98 % | DIASTOLIC BLOOD PRESSURE: 67 MMHG

## 2021-07-19 DIAGNOSIS — J44.9 CHRONIC OBSTRUCTIVE PULMONARY DISEASE, UNSPECIFIED COPD TYPE (H): ICD-10-CM

## 2021-07-19 DIAGNOSIS — R53.81 PHYSICAL DECONDITIONING: ICD-10-CM

## 2021-07-19 DIAGNOSIS — Z71.89 ACP (ADVANCE CARE PLANNING): ICD-10-CM

## 2021-07-19 DIAGNOSIS — F41.9 ANXIETY: ICD-10-CM

## 2021-07-19 DIAGNOSIS — K59.09 CHRONIC CONSTIPATION: ICD-10-CM

## 2021-07-19 DIAGNOSIS — F33.9 EPISODE OF RECURRENT MAJOR DEPRESSIVE DISORDER, UNSPECIFIED DEPRESSION EPISODE SEVERITY (H): ICD-10-CM

## 2021-07-19 DIAGNOSIS — E87.6 HYPOKALEMIA: ICD-10-CM

## 2021-07-19 DIAGNOSIS — I10 ESSENTIAL HYPERTENSION: ICD-10-CM

## 2021-07-19 DIAGNOSIS — Z95.828 S/P IVC FILTER: ICD-10-CM

## 2021-07-19 DIAGNOSIS — C71.9 GBM (GLIOBLASTOMA MULTIFORME) (H): ICD-10-CM

## 2021-07-19 DIAGNOSIS — U07.1 COVID-19: ICD-10-CM

## 2021-07-19 DIAGNOSIS — D69.6 THROMBOCYTOPENIA (H): Primary | ICD-10-CM

## 2021-07-19 DIAGNOSIS — G40.909 RECURRENT SEIZURES (H): ICD-10-CM

## 2021-07-19 DIAGNOSIS — I82.493 ACUTE DEEP VEIN THROMBOSIS (DVT) OF OTHER SPECIFIED VEIN OF BOTH LOWER EXTREMITIES (H): ICD-10-CM

## 2021-07-19 DIAGNOSIS — K21.9 GASTROESOPHAGEAL REFLUX DISEASE WITHOUT ESOPHAGITIS: ICD-10-CM

## 2021-07-19 LAB
BASOPHILS # BLD AUTO: 0 10E3/UL (ref 0–0.2)
BASOPHILS NFR BLD AUTO: 0 %
EOSINOPHIL # BLD AUTO: 0.3 10E3/UL (ref 0–0.7)
EOSINOPHIL NFR BLD AUTO: 10 %
ERYTHROCYTE [DISTWIDTH] IN BLOOD BY AUTOMATED COUNT: 14.2 % (ref 10–15)
GAMMA INTERFERON BACKGROUND BLD IA-ACNC: 0.02 IU/ML
HCT VFR BLD AUTO: 28.3 % (ref 35–47)
HGB BLD-MCNC: 9.1 G/DL (ref 11.7–15.7)
IMM GRANULOCYTES # BLD: 0 10E3/UL
IMM GRANULOCYTES NFR BLD: 0 %
LYMPHOCYTES # BLD AUTO: 0.4 10E3/UL (ref 0.8–5.3)
LYMPHOCYTES NFR BLD AUTO: 15 %
M TB IFN-G BLD-IMP: NEGATIVE
M TB IFN-G CD4+ BCKGRND COR BLD-ACNC: 2.88 IU/ML
MCH RBC QN AUTO: 31.2 PG (ref 26.5–33)
MCHC RBC AUTO-ENTMCNC: 32.2 G/DL (ref 31.5–36.5)
MCV RBC AUTO: 97 FL (ref 78–100)
MITOGEN IGNF BCKGRD COR BLD-ACNC: 0.02 IU/ML
MITOGEN IGNF BCKGRD COR BLD-ACNC: 0.05 IU/ML
MONOCYTES # BLD AUTO: 0.4 10E3/UL (ref 0–1.3)
MONOCYTES NFR BLD AUTO: 13 %
NEUTROPHILS # BLD AUTO: 1.7 10E3/UL (ref 1.6–8.3)
NEUTROPHILS NFR BLD AUTO: 62 %
NRBC # BLD AUTO: 0 10E3/UL
NRBC BLD AUTO-RTO: 0 /100
PLATELET # BLD AUTO: 26 10E3/UL (ref 150–450)
RBC # BLD AUTO: 2.92 10E6/UL (ref 3.8–5.2)
WBC # BLD AUTO: 2.7 10E3/UL (ref 4–11)

## 2021-07-19 PROCEDURE — P9604 ONE-WAY ALLOW PRORATED TRIP: HCPCS | Mod: ORL | Performed by: NURSE PRACTITIONER

## 2021-07-19 PROCEDURE — 36415 COLL VENOUS BLD VENIPUNCTURE: CPT | Mod: ORL | Performed by: NURSE PRACTITIONER

## 2021-07-19 PROCEDURE — U0005 INFEC AGEN DETEC AMPLI PROBE: HCPCS | Mod: ORL | Performed by: NURSE PRACTITIONER

## 2021-07-19 PROCEDURE — 99310 SBSQ NF CARE HIGH MDM 45: CPT | Performed by: NURSE PRACTITIONER

## 2021-07-19 PROCEDURE — 85025 COMPLETE CBC W/AUTO DIFF WBC: CPT | Mod: ORL | Performed by: NURSE PRACTITIONER

## 2021-07-19 ASSESSMENT — MIFFLIN-ST. JEOR: SCORE: 1102.34
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
## 2021-07-19 NOTE — PROGRESS NOTES
Video-Visit Details  Type of service:  Video Visit     Video Start Time:  01:11PM  Video End Time: 02:00 PM  Originating Location (pt. Location): Care center     Distant Location (provider location):  [facility]      Platform used for Video Visit: [employer]      _________________________________________________________________________________    NEURO-ONCOLOGY VISIT  Jul 20, 2021    CHIEF COMPLAINT: Ms. Olga Bailey is a 75 year old right-handed woman with a left frontoparietal glioblastoma (IDH wild-type, MGMT promoter methylated), diagnosed following gross total resection on 2/23/2021. Initiation of upfront cancer-directed treatment was delayed due to a complicated hospitalization. Given a resolving infection and lowered functional status, radiation alone was initiated on 5/4/2021 and completed on 5/27/2021.     Olga is currently receiving treatment with adjuvant temozolomide, however, cycle 1 was complicated by significant hematologic toxicity. The plan is to transition to metronomic dosing.     Olga is presenting in follow-up accompanied by Rita (daughter) + RN from [facility].     HISTORY OF PRESENT ILLNESS  -Olga was discharged from TCU on 7/15 and is currently residing in [facility].  -Participating well with therapies three times per week. She can walk on her own with a walker 130 feet, minimal assistance for standing.  -Prior to discharging from TCU, providers reported right-sided neglect and right-sided drift.  -She had a fall over the weekend. She was trying to get up from the chair, but wasn't strong enough to do this. She fell onto her knees, but did not hit head or hurt herself. She has scatter ecchymoses, no apparent hematomas.  -Overall, tolerated cycle 1 of chemotherapy well. Nausea is well controlled on supportive medications, denies issues with constipation on current bowel regimen. However, platelets have dropped into the 20's.   -Energy was  lowered with chemotherapy, but has been improving.   -Mental status is stable remaining a little slowed/ mild confusion. She continues to note only mild aphasia. Previously working with speech therapy, but no longer per Olga. La Nena agrees that she is becoming more forgetful and having difficulty with more complex tasks.  -Less frequent left V2 division facial pain nightly when going to bed. Sharp pain lasting until she falls asleep. 8/10 in intensity. She has acetaminophen as needed.   -Headaches resolved.  -Denies changes in sensation.  -Off dexamethasone.   -No recurrent seizures.     REVIEW OF SYSTEMS  A comprehensive ROS negative except as in HPI.    MEDICATIONS   Current Outpatient Medications   Medication     acetaminophen (TYLENOL) 325 MG tablet     albuterol (PROAIR HFA/PROVENTIL HFA/VENTOLIN HFA) 108 (90 Base) MCG/ACT inhaler     albuterol (PROVENTIL) (2.5 MG/3ML) 0.083% neb solution     amLODIPine (NORVASC) 5 MG tablet     busPIRone HCl (BUSPAR) 30 MG tablet     calcium polycarbophil (FIBERCON) 625 MG tablet     docusate sodium (COLACE) 100 MG capsule     FLUoxetine (PROZAC) 20 MG capsule     furosemide (LASIX) 40 MG tablet     levETIRAcetam (KEPPRA) 250 MG tablet     linaclotide (LINZESS) 290 MCG capsule     melatonin 3 MG tablet     pantoprazole (PROTONIX) 40 MG EC tablet     polyethylene glycol (MIRALAX) 17 GM/Dose powder     potassium chloride (KLOR-CON) 20 MEQ packet     QUEtiapine (SEROQUEL) 25 MG tablet     sennosides (SENOKOT) 8.6 MG tablet     sulfamethoxazole-trimethoprim (BACTRIM) 400-80 MG tablet     No current facility-administered medications for this visit.     Current Outpatient Medications   Medication Sig Dispense Refill     acetaminophen (TYLENOL) 325 MG tablet Take 650 mg by mouth every 4 hours as needed for mild pain        albuterol (PROAIR HFA/PROVENTIL HFA/VENTOLIN HFA) 108 (90 Base) MCG/ACT inhaler Inhale 2 puffs into the lungs every 4 hours as needed for wheezing        albuterol (PROVENTIL) (2.5 MG/3ML) 0.083% neb solution Take 1 vial (2.5 mg) by nebulization every 4 hours as needed for wheezing or shortness of breath / dyspnea       amLODIPine (NORVASC) 5 MG tablet Take 1 tablet (5 mg) by mouth daily       busPIRone HCl (BUSPAR) 30 MG tablet Take 30 mg by mouth 2 times daily       calcium polycarbophil (FIBERCON) 625 MG tablet Take 1 tablet (625 mg) by mouth daily       docusate sodium (COLACE) 100 MG capsule Take 1 capsule (100 mg) by mouth 2 times daily as needed for constipation       FLUoxetine (PROZAC) 20 MG capsule Take 3 capsules (60 mg) by mouth daily 30 capsule 0     furosemide (LASIX) 40 MG tablet Take 1 tablet (40 mg) by mouth daily 30 tablet 0     levETIRAcetam (KEPPRA) 250 MG tablet Take 5 tablets (1,250 mg) by mouth 2 times daily 60 tablet 0     linaclotide (LINZESS) 290 MCG capsule Take 1 capsule (290 mcg) by mouth every morning (before breakfast)       melatonin 3 MG tablet Take 2 tablets (6 mg) by mouth nightly as needed for sleep 30 tablet 0     pantoprazole (PROTONIX) 40 MG EC tablet Take 1 tablet (40 mg) by mouth 2 times daily (before meals) 30 tablet 0     polyethylene glycol (MIRALAX) 17 GM/Dose powder Take 17 g by mouth daily as needed for constipation 510 g 0     potassium chloride (KLOR-CON) 20 MEQ packet Take 40 mEq by mouth daily       QUEtiapine (SEROQUEL) 25 MG tablet Take 1 tablet (25 mg) by mouth 2 times daily 30 tablet 0     sennosides (SENOKOT) 8.6 MG tablet Take 1 tablet by mouth 2 times daily as needed for constipation 30 tablet 0     sulfamethoxazole-trimethoprim (BACTRIM) 400-80 MG tablet Take 1 tablet by mouth daily 45 tablet 0     DRUG ALLERGIES   Allergies   Allergen Reactions     Bacitracin Rash     Bactroban is effective; No difficulties     Erythromycin      intolerant.     Meperidine Hcl      intolerant only   Demerol     Chloraprep One Step Rash     IMMUNIZATIONS   Immunization History   Administered Date(s) Administered      COVID-19,PF,Moderna 03/18/2021     Flu 65+ Years 09/28/2020     HepB 01/01/1990     Influenza (IIV3) PF 01/01/2001     Influenza, Quad, High Dose, Pf, 65yr + 09/28/2020     Pneumococcal 23 valent 07/22/2020     Tetanus 06/13/2004     Zoster vaccine recombinant adjuvanted (SHINGRIX) 12/24/2019, 10/12/2020       ONCOLOGIC HISTORY  -2/2021 PRESENTATION: Progressive aphasia.   -2/19/2021 MR brain imaging with 3.3 x 2.8 x 2.8 cm enhancing mass in left frontal-parietal region. A second contrast enhancing lesion (0.5 x 1.0 x 1.0 cm) in right occipital lobe which is dural based and largely stable in size since 9/2011; likely representing a meningioma.  -2/23/2021 SURGERY: Gross total resection by Dr. Cummings  PATHOLOGY: Glioblastoma; IDH1-R132H wild-type/ IDH 1 and 2 wildtype, MGMT promoter methylated. Not BRAF mutated.   -3/23/2021 NEURO-ONC: Recommending chemoradiotherapy.   -3/2021 ADMISSION: SOB and chest pain; CT PA negative, but bilateral DVT noted on U/S. Started on Lovenox. Acute hypoxic respiratory failure in the setting of bilateral pneumonia; presumed PJP, concern for transfusion-related acute lung injury and right hemidiaphragm paralysis. Seizure. Right retroperitoneal hematoma. Ileus. Non-severe malnutrition on TPN with concern for refeeding syndrome. 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 Shock Liver.  -5/4 - 5/27/2021 RADS: 15 fractions.   -5/25/2021 NEURO-ONC/ DEVICE: Discussed the role of Optune; to be considered. Repeat MRI in 4 weeks with plans to start adjuvant temozolomide.   -6/22/2021 NEURO-ONC/ MRB/ CHEMO: Clinically improving. Imaging largely stable. Starting adjuvant temozolomide 150mg/m2 (250mg), cycle 1 (start date 6/24).   -7/20/2021 NEURO-ONC/ CHEMO: Clinically improving. Thrombocytopenia noted with adjuvant temozolomide dosing, platelets of 26K; will transition to metronomic dosing 50mg/m2 (100mg) daily to start once platelets are > 80K.      SOCIAL HISTORY   History   Smoking Status     Never  "Smoker   Smokeless Tobacco     Never Used    Social History    Substance and Sexual Activity      Alcohol use: Yes        Comment: very rare     History   Drug Use No       PHYSICAL EXAMINATION  Wt 65.1 kg (143 lb 9.6 oz)   BMI 26.26 kg/m    Wt Readings from Last 2 Encounters:   07/20/21 65.1 kg (143 lb 9.6 oz)   07/19/21 65.4 kg (144 lb 3.2 oz)      Ht Readings from Last 2 Encounters:   07/19/21 1.575 m (5' 2\")   07/01/21 1.6 m (5' 2.99\")     KPS: 60    -Generally well appearing. Slightly fatigued.   -Respiratory: No increased work of breathing.  -Skin: No facial rashes. Bruising on knees.   -Psychiatric: Normal mood and affect. Pleasant, talkative.  -Neurologic:   MENTAL STATUS:     Alert, oriented to month, but not day or year.    Recall: Intact, immediate 2/3, delayed 2/3.     Speech largely fluent. Mild aphasia with paraphasic error especially  With challenging words cuticle.   Comprehension intact to multi-step commands.   Good right-left orientation.     CRANIAL NERVES:     Pupils are equal, round.     Extraocular movements full, denies diplopia.     Visual fields full.     Facial sensation intact to light touch.   No facial droop today.   Hearing slightly decreased bilaterally.   Normal phonation.   MOTOR: No pronation or drift. (Pronation at baseline on right; related to shoulder).   SENSATION: Intact to light touch throughout.  COORDINATION: Slight impairment on finger-nose with eyes open and closed on the right.  GAIT: Sitting in wheelchair, uses walker, which is not present and did not test.     The rest of a comprehensive physical examination is deferred due to [facility] (public health emergency) video visit restrictions.        MEDICAL RECORDS  Obtained and personally reviewed all available outside medical records in addition to reviewing any records available in our electronic system.     LABS  Personally reviewed all available lab results.     IMAGING  No new neuro-imaging to review.  "       IMPRESSION  Clinic time for this high complexity encounter was spent discussing in detail the nature of her cancer, providing emotional support, answering questions pertaining to my recommendations, and devising the plan as outlined below.     Clinically, I am again pleased with how well Olga is improving with the assistance of therapies and further optimizing her medical management. She is off dexamethasone. Energy is better. Unfortunately, she did have a fall, but there was no evidence of a large hematoma given her chemotherapy-induce thrombocytopenia.    Cycle 1 of adjuvant-dosed temozolomide was administered and symptomatically, Olga tolerated it well without any reported side effects. However, repeat lab testing has identified worsening thrombocytopenia. Labs from earlier this week showed what is hopefully her gustavo at 26K. As a result, we will hold on re-starting any chemotherapy at this time. The plan will be to check CBC with platelets in the next 1-2 days. Once platelets are over 80K, can resume temozolomide at metronomic/ daily dosing as to avoid severe chemotherapy-induced thrombocytopenia.    Scheduled to see Dr. Gamboa on 8/2 with regard to the use of anticoagulation.     PROBLEM LIST  Glioblastoma  Aphasia  Apraxia    PLAN  -CANCER DIRECTED THERAPY-  -Hold on re-starting temozolomide until platelets are greater than 80K.    Repeat CBC w platelet on 7/21.   -Changing temozolomide to daily (metronomic) dosing at 50mg/m2; 100mg/ day.  -Supportive medications; Zofran only as needed/ not scheduled and bowel regimen.   -Currently on Bactrim.   -Repeat imaging next month.    -STEROIDS-  -Off dexamethasone.    -SEIZURE MANAGEMENT-  -Given history of seizures, antiepileptics are indicated.   -Continue Keppra.     -DVT-  -Holding on anticoagulation at this time, particularly in the setting of thrombocytopenia.   -Requires lifelong anticoagulation given cancer diagnosis, however, has a history of a  hematoma and falls, which contraindicated for anticoagulation.   -Referral to hem/onc previously placed; appt on 8/2.   -Will need to consider removal of the IVC filter.     -Quality of life/ MOOD/ FATIGUE-  -Denies any mood issues.  -Continue to monitor mood as untreated/ undertreated depression can worsen fatigue, dysorexia, and quality of life.    Return to clinic in 1 month + imaging + labs.     In the meantime, Olga and La Nena know to call with questions or concerns or to report new complaints and can be seen sooner if needed.    The patient was seen and evaluated with PGY-3 neurology resident, Silvia Riggs MD.    Stephanie Baker MD  Neuro-oncology

## 2021-07-19 NOTE — PATIENT INSTRUCTIONS
Alejandra Bailey  1945  1) BMP 7/26. Diagnosis: hypokalemia  2) ACP referral for anxiety and depression  3) No suppository due to thrombocytopenia  4) CBC with diff twice weekly on Monday and Thursday. Diagnosis: thrombocytopenia  Ilsa Galindo, KAYCE CNP on 7/19/2021 at 4:20 PM

## 2021-07-20 ENCOUNTER — VIRTUAL VISIT (OUTPATIENT)
Dept: ONCOLOGY | Facility: CLINIC | Age: 76
End: 2021-07-20
Attending: PSYCHIATRY & NEUROLOGY
Payer: MEDICARE

## 2021-07-20 VITALS — WEIGHT: 143.6 LBS | BODY MASS INDEX: 26.26 KG/M2

## 2021-07-20 DIAGNOSIS — C71.9 GBM (GLIOBLASTOMA MULTIFORME) (H): Primary | ICD-10-CM

## 2021-07-20 PROCEDURE — 99215 OFFICE O/P EST HI 40 MIN: CPT | Mod: 95 | Performed by: PSYCHIATRY & NEUROLOGY

## 2021-07-20 ASSESSMENT — PAIN SCALES - GENERAL: PAINLEVEL: NO PAIN (0)

## 2021-07-20 NOTE — LETTER
7/20/2021         RE: Olga Bailey  [address] [facility] [address]        Dear Colleague,    Thank you for referring your patient, Olga Bailey, to the [facility]. Please see a copy of my visit note below.    Video-Visit Details  Type of service:  Video Visit     Video Start Time:  01:11PM  Video End Time: 02:00 PM  Originating Location (pt. Location): Care center     Distant Location (provider location):  [facility]      Platform used for Video Visit: Email Data Source      _________________________________________________________________________________    NEURO-ONCOLOGY VISIT  Jul 20, 2021    CHIEF COMPLAINT: Ms. Olga Bailey is a 75 year old right-handed woman with a left frontoparietal glioblastoma (IDH wild-type, MGMT promoter methylated), diagnosed following gross total resection on 2/23/2021. Initiation of upfront cancer-directed treatment was delayed due to a complicated hospitalization. Given a resolving infection and lowered functional status, radiation alone was initiated on 5/4/2021 and completed on 5/27/2021.     Olga is currently receiving treatment with adjuvant temozolomide, however, cycle 1 was complicated by significant hematologic toxicity. The plan is to transition to metronomic dosing.     Olga is presenting in follow-up accompanied by Rita (daughter) + RN from [facility].     HISTORY OF PRESENT ILLNESS  -Olga was discharged from TCU on 7/15 and is currently residing in [facility].  -Participating well with therapies three times per week. She can walk on her own with a walker 130 feet, minimal assistance for standing.  -Prior to discharging from U, providers reported right-sided neglect and right-sided drift.  -She had a fall over the weekend. She was trying to get up from the chair, but wasn't strong enough to do this. She fell onto her knees, but did not hit head or hurt herself. She has scatter  ecchymoses, no apparent hematomas.  -Overall, tolerated cycle 1 of chemotherapy well. Nausea is well controlled on supportive medications, denies issues with constipation on current bowel regimen. However, platelets have dropped into the 20's.   -Energy was lowered with chemotherapy, but has been improving.   -Mental status is stable remaining a little slowed/ mild confusion. She continues to note only mild aphasia. Previously working with speech therapy, but no longer per Olga. La Nena agrees that she is becoming more forgetful and having difficulty with more complex tasks.  -Less frequent left V2 division facial pain nightly when going to bed. Sharp pain lasting until she falls asleep. 8/10 in intensity. She has acetaminophen as needed.   -Headaches resolved.  -Denies changes in sensation.  -Off dexamethasone.   -No recurrent seizures.     REVIEW OF SYSTEMS  A comprehensive ROS negative except as in HPI.    MEDICATIONS   Current Outpatient Medications   Medication     acetaminophen (TYLENOL) 325 MG tablet     albuterol (PROAIR HFA/PROVENTIL HFA/VENTOLIN HFA) 108 (90 Base) MCG/ACT inhaler     albuterol (PROVENTIL) (2.5 MG/3ML) 0.083% neb solution     amLODIPine (NORVASC) 5 MG tablet     busPIRone HCl (BUSPAR) 30 MG tablet     calcium polycarbophil (FIBERCON) 625 MG tablet     docusate sodium (COLACE) 100 MG capsule     FLUoxetine (PROZAC) 20 MG capsule     furosemide (LASIX) 40 MG tablet     levETIRAcetam (KEPPRA) 250 MG tablet     linaclotide (LINZESS) 290 MCG capsule     melatonin 3 MG tablet     pantoprazole (PROTONIX) 40 MG EC tablet     polyethylene glycol (MIRALAX) 17 GM/Dose powder     potassium chloride (KLOR-CON) 20 MEQ packet     QUEtiapine (SEROQUEL) 25 MG tablet     sennosides (SENOKOT) 8.6 MG tablet     sulfamethoxazole-trimethoprim (BACTRIM) 400-80 MG tablet     No current facility-administered medications for this visit.     Current Outpatient Medications   Medication Sig Dispense Refill      acetaminophen (TYLENOL) 325 MG tablet Take 650 mg by mouth every 4 hours as needed for mild pain        albuterol (PROAIR HFA/PROVENTIL HFA/VENTOLIN HFA) 108 (90 Base) MCG/ACT inhaler Inhale 2 puffs into the lungs every 4 hours as needed for wheezing       albuterol (PROVENTIL) (2.5 MG/3ML) 0.083% neb solution Take 1 vial (2.5 mg) by nebulization every 4 hours as needed for wheezing or shortness of breath / dyspnea       amLODIPine (NORVASC) 5 MG tablet Take 1 tablet (5 mg) by mouth daily       busPIRone HCl (BUSPAR) 30 MG tablet Take 30 mg by mouth 2 times daily       calcium polycarbophil (FIBERCON) 625 MG tablet Take 1 tablet (625 mg) by mouth daily       docusate sodium (COLACE) 100 MG capsule Take 1 capsule (100 mg) by mouth 2 times daily as needed for constipation       FLUoxetine (PROZAC) 20 MG capsule Take 3 capsules (60 mg) by mouth daily 30 capsule 0     furosemide (LASIX) 40 MG tablet Take 1 tablet (40 mg) by mouth daily 30 tablet 0     levETIRAcetam (KEPPRA) 250 MG tablet Take 5 tablets (1,250 mg) by mouth 2 times daily 60 tablet 0     linaclotide (LINZESS) 290 MCG capsule Take 1 capsule (290 mcg) by mouth every morning (before breakfast)       melatonin 3 MG tablet Take 2 tablets (6 mg) by mouth nightly as needed for sleep 30 tablet 0     pantoprazole (PROTONIX) 40 MG EC tablet Take 1 tablet (40 mg) by mouth 2 times daily (before meals) 30 tablet 0     polyethylene glycol (MIRALAX) 17 GM/Dose powder Take 17 g by mouth daily as needed for constipation 510 g 0     potassium chloride (KLOR-CON) 20 MEQ packet Take 40 mEq by mouth daily       QUEtiapine (SEROQUEL) 25 MG tablet Take 1 tablet (25 mg) by mouth 2 times daily 30 tablet 0     sennosides (SENOKOT) 8.6 MG tablet Take 1 tablet by mouth 2 times daily as needed for constipation 30 tablet 0     sulfamethoxazole-trimethoprim (BACTRIM) 400-80 MG tablet Take 1 tablet by mouth daily 45 tablet 0     DRUG ALLERGIES   Allergies   Allergen Reactions      Bacitracin Rash     Bactroban is effective; No difficulties     Erythromycin      intolerant.     Meperidine Hcl      intolerant only   Demerol     Chloraprep One Step Rash     IMMUNIZATIONS   Immunization History   Administered Date(s) Administered     COVID-19,PF,Moderna 03/18/2021     Flu 65+ Years 09/28/2020     HepB 01/01/1990     Influenza (IIV3) PF 01/01/2001     Influenza, Quad, High Dose, Pf, 65yr + 09/28/2020     Pneumococcal 23 valent 07/22/2020     Tetanus 06/13/2004     Zoster vaccine recombinant adjuvanted (SHINGRIX) 12/24/2019, 10/12/2020       ONCOLOGIC HISTORY  -2/2021 PRESENTATION: Progressive aphasia.   -2/19/2021 MR brain imaging with 3.3 x 2.8 x 2.8 cm enhancing mass in left frontal-parietal region. A second contrast enhancing lesion (0.5 x 1.0 x 1.0 cm) in right occipital lobe which is dural based and largely stable in size since 9/2011; likely representing a meningioma.  -2/23/2021 SURGERY: Gross total resection by Dr. Cummings  PATHOLOGY: Glioblastoma; IDH1-R132H wild-type/ IDH 1 and 2 wildtype, MGMT promoter methylated. Not BRAF mutated.   -3/23/2021 NEURO-ONC: Recommending chemoradiotherapy.   -3/2021 ADMISSION: SOB and chest pain; CT PA negative, but bilateral DVT noted on U/S. Started on Lovenox. Acute hypoxic respiratory failure in the setting of bilateral pneumonia; presumed PJP, concern for transfusion-related acute lung injury and right hemidiaphragm paralysis. Seizure. Right retroperitoneal hematoma. Ileus. Non-severe malnutrition on TPN with concern for refeeding syndrome. 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 Shock Liver.  -5/4 - 5/27/2021 RADS: 15 fractions.   -5/25/2021 NEURO-ONC/ DEVICE: Discussed the role of Optune; to be considered. Repeat MRI in 4 weeks with plans to start adjuvant temozolomide.   -6/22/2021 NEURO-ONC/ MRB/ CHEMO: Clinically improving. Imaging largely stable. Starting adjuvant temozolomide 150mg/m2 (250mg), cycle 1 (start date 6/24).   -7/20/2021 NEURO-ONC/  "CHEMO: Clinically improving. Thrombocytopenia noted with adjuvant temozolomide dosing, platelets of 26K; will transition to metronomic dosing 50mg/m2 (100mg) daily to start once platelets are > 80K.      SOCIAL HISTORY   History   Smoking Status     Never Smoker   Smokeless Tobacco     Never Used    Social History    Substance and Sexual Activity      Alcohol use: Yes        Comment: very rare     History   Drug Use No       PHYSICAL EXAMINATION  Wt 65.1 kg (143 lb 9.6 oz)   BMI 26.26 kg/m    Wt Readings from Last 2 Encounters:   07/20/21 65.1 kg (143 lb 9.6 oz)   07/19/21 65.4 kg (144 lb 3.2 oz)      Ht Readings from Last 2 Encounters:   07/19/21 1.575 m (5' 2\")   07/01/21 1.6 m (5' 2.99\")     KPS: 60    -Generally well appearing. Slightly fatigued.   -Respiratory: No increased work of breathing.  -Skin: No facial rashes. Bruising on knees.   -Psychiatric: Normal mood and affect. Pleasant, talkative.  -Neurologic:   MENTAL STATUS:     Alert, oriented to month, but not day or year.    Recall: Intact, immediate 2/3, delayed 2/3.     Speech largely fluent. Mild aphasia with paraphasic error especially  With challenging words cuticle.   Comprehension intact to multi-step commands.   Good right-left orientation.     CRANIAL NERVES:     Pupils are equal, round.     Extraocular movements full, denies diplopia.     Visual fields full.     Facial sensation intact to light touch.   No facial droop today.   Hearing slightly decreased bilaterally.   Normal phonation.   MOTOR: No pronation or drift. (Pronation at baseline on right; related to shoulder).   SENSATION: Intact to light touch throughout.  COORDINATION: Slight impairment on finger-nose with eyes open and closed on the right.  GAIT: Sitting in wheelchair, uses walker, which is not present and did not test.     The rest of a comprehensive physical examination is deferred due to PHE (public health emergency) video visit restrictions.        MEDICAL RECORDS  Obtained " and personally reviewed all available outside medical records in addition to reviewing any records available in our electronic system.     LABS  Personally reviewed all available lab results.     IMAGING  No new neuro-imaging to review.        IMPRESSION  Clinic time for this high complexity encounter was spent discussing in detail the nature of her cancer, providing emotional support, answering questions pertaining to my recommendations, and devising the plan as outlined below.     Clinically, I am again pleased with how well Olga is improving with the assistance of therapies and further optimizing her medical management. She is off dexamethasone. Energy is better. Unfortunately, she did have a fall, but there was no evidence of a large hematoma given her chemotherapy-induce thrombocytopenia.    Cycle 1 of adjuvant-dosed temozolomide was administered and symptomatically, Olga tolerated it well without any reported side effects. However, repeat lab testing has identified worsening thrombocytopenia. Labs from earlier this week showed what is hopefully her gustavo at 26K. As a result, we will hold on re-starting any chemotherapy at this time. The plan will be to check CBC with platelets in the next 1-2 days. Once platelets are over 80K, can resume temozolomide at metronomic/ daily dosing as to avoid severe chemotherapy-induced thrombocytopenia.    Scheduled to see Dr. Gamboa on 8/2 with regard to the use of anticoagulation.     PROBLEM LIST  Glioblastoma  Aphasia  Apraxia    PLAN  -CANCER DIRECTED THERAPY-  -Hold on re-starting temozolomide until platelets are greater than 80K.    Repeat CBC w platelet on 7/21.   -Changing temozolomide to daily (metronomic) dosing at 50mg/m2; 100mg/ day.  -Supportive medications; Zofran only as needed/ not scheduled and bowel regimen.   -Currently on Bactrim.   -Repeat imaging next month.    -STEROIDS-  -Off dexamethasone.    -SEIZURE MANAGEMENT-  -Given history of seizures,  antiepileptics are indicated.   -Continue Keppra.     -DVT-  -Holding on anticoagulation at this time, particularly in the setting of thrombocytopenia.   -Requires lifelong anticoagulation given cancer diagnosis, however, has a history of a hematoma and falls, which contraindicated for anticoagulation.   -Referral to hem/onc previously placed; appt on 8/2.   -Will need to consider removal of the IVC filter.     -Quality of life/ MOOD/ FATIGUE-  -Denies any mood issues.  -Continue to monitor mood as untreated/ undertreated depression can worsen fatigue, dysorexia, and quality of life.    Return to clinic in 1 month + imaging + labs.     In the meantime, Olga and La Nena know to call with questions or concerns or to report new complaints and can be seen sooner if needed.    The patient was seen and evaluated with PGY-3 neurology resident, Silvia Riggs MD.    Stephanie Baker MD  Neuro-oncology      Olga is a 75 year old who is being evaluated via a billable video visit.      How would you like to obtain your AVS? MyChart  If the video visit is dropped, the invitation should be resent by: Send to e-mail at: [email or web address]  Will anyone else be joining your video visit? Yes: daughter. How would they like to receive their invitation? Text to cell phone: [phone]    Paula Turner CMA        Again, thank you for allowing me to participate in the care of your patient.        Sincerely,        Stephanie Baker MD

## 2021-07-20 NOTE — PROGRESS NOTES
Olga is a 75 year old who is being evaluated via a billable video visit.      How would you like to obtain your AVS? MyChart  If the video visit is dropped, the invitation should be resent by: Send to e-mail at: [email or web address]  Will anyone else be joining your video visit? Yes: daughter. How would they like to receive their invitation? Text to cell phone: [phone]    Paula Turner CMA

## 2021-07-21 ENCOUNTER — PATIENT OUTREACH (OUTPATIENT)
Dept: ONCOLOGY | Facility: CLINIC | Age: 76
End: 2021-07-21

## 2021-07-21 LAB — SARS-COV-2 RNA RESP QL NAA+PROBE: NEGATIVE

## 2021-07-21 NOTE — PATIENT INSTRUCTIONS
Changing temozolomide to daily (metronomic) dosing; 100mg/ day.   Can start once platelets are > 80K.   Zofran as needed for nausea.   Aggressive bowel regimen to avoid constipation.     Platelets of 26K.   Repeat CBC w platelet on 7/21.     Holding on anticoagulation.   Appt with hematology on 8/2.     Repeat MR brain imaging in 4 weeks.     Return to clinic + imaging in 4 weeks.     Stephanie Baker MD  Neuro-oncology  7/20/2021

## 2021-07-21 NOTE — PROGRESS NOTES
"Per Dr. Baker,   \"Can you please make sure [facility] gets my AVS/ clinic notes as they requested.   Additionally can you please have them check CBC w platelets on Wed (7/21) (or on 7/22 at the latest).   Thanks,   Stephanie Baker MD   Neuro-oncology   7/20/2021\"    Writer contacted [facility] and spoke with Arminda. Visit note/AVS faxed to:   [facility] as of 7/15/21   Phone: [phone]  Fax: [phone]    Confirmed with Arminda patient is already scheduled for a CBC w/ plt Thursday 7/22. Will follow-up as needed pending lab results. Working with pharmacy to secure/deliver metronomic dosing to [facility].    Confirmed receipt of visit note/recommendations with ELEANOR Coello at [facility]. Oumou believes TMZ needs to be filled through [facility] and will request pharmacy call clinic to coordinate. Verbal order given for Zofran 4mg PRN per Dr. Baker's recommendations.     Will send TMZ/Zofran Rx to preferred pharmacy.     Louise Jerry, BSN, RN, PHN, OCN  Oncology Care Coordinator  [facility]      "

## 2021-07-22 ENCOUNTER — TELEPHONE (OUTPATIENT)
Dept: ONCOLOGY | Facility: CLINIC | Age: 76
End: 2021-07-22

## 2021-07-22 ENCOUNTER — LAB REQUISITION (OUTPATIENT)
Dept: LAB | Facility: CLINIC | Age: 76
End: 2021-07-22
Payer: MEDICARE

## 2021-07-22 DIAGNOSIS — C71.9 MALIGNANT NEOPLASM OF BRAIN, UNSPECIFIED (H): ICD-10-CM

## 2021-07-22 NOTE — TELEPHONE ENCOUNTER
Patient's daughter La Nena called clinic stating that Olga's labs were not able to be drawn today at [facility].     LORNA Hilliard at [facility] also called stating that labs could not be drawn. Olga will have her labs drawn tomorrow according to Arminda. Arminda also inquired if she should still be on a Monday/Thursday schedule for labs.     Message will be forwarded to Dr. Baker to inform her that labs can be drawn tomorrow and confirm that is ok, or else patient will need to be transported to hospital as they cannot be drawn today.     La Nena would like a call back at [phone] as well as LORNA Hilliard at [facility] at [phone] with Dr. Baker's recommendation.

## 2021-07-23 ENCOUNTER — TELEPHONE (OUTPATIENT)
Dept: GERIATRICS | Facility: CLINIC | Age: 76
End: 2021-07-23

## 2021-07-23 ENCOUNTER — TELEPHONE (OUTPATIENT)
Dept: ONCOLOGY | Facility: CLINIC | Age: 76
End: 2021-07-23

## 2021-07-23 DIAGNOSIS — R11.2 CHEMOTHERAPY-INDUCED NAUSEA AND VOMITING: ICD-10-CM

## 2021-07-23 DIAGNOSIS — T45.1X5A CHEMOTHERAPY-INDUCED NAUSEA AND VOMITING: ICD-10-CM

## 2021-07-23 DIAGNOSIS — C71.9 GLIOBLASTOMA (H): Primary | ICD-10-CM

## 2021-07-23 LAB
BASOPHILS # BLD AUTO: 0 10E3/UL (ref 0–0.2)
BASOPHILS NFR BLD AUTO: 1 %
EOSINOPHIL # BLD AUTO: 0.1 10E3/UL (ref 0–0.7)
EOSINOPHIL NFR BLD AUTO: 4 %
ERYTHROCYTE [DISTWIDTH] IN BLOOD BY AUTOMATED COUNT: 14.7 % (ref 10–15)
HCT VFR BLD AUTO: 26.6 % (ref 35–47)
HGB BLD-MCNC: 8.6 G/DL (ref 11.7–15.7)
IMM GRANULOCYTES # BLD: 0 10E3/UL
IMM GRANULOCYTES NFR BLD: 1 %
LYMPHOCYTES # BLD AUTO: 0.3 10E3/UL (ref 0.8–5.3)
LYMPHOCYTES NFR BLD AUTO: 14 %
MCH RBC QN AUTO: 30.9 PG (ref 26.5–33)
MCHC RBC AUTO-ENTMCNC: 32.3 G/DL (ref 31.5–36.5)
MCV RBC AUTO: 96 FL (ref 78–100)
MONOCYTES # BLD AUTO: 0.3 10E3/UL (ref 0–1.3)
MONOCYTES NFR BLD AUTO: 13 %
NEUTROPHILS # BLD AUTO: 1.5 10E3/UL (ref 1.6–8.3)
NEUTROPHILS NFR BLD AUTO: 67 %
NRBC # BLD AUTO: 0 10E3/UL
NRBC BLD AUTO-RTO: 0 /100
PLATELET # BLD AUTO: 130 10E3/UL (ref 150–450)
RBC # BLD AUTO: 2.78 10E6/UL (ref 3.8–5.2)
WBC # BLD AUTO: 2.2 10E3/UL (ref 4–11)

## 2021-07-23 PROCEDURE — P9604 ONE-WAY ALLOW PRORATED TRIP: HCPCS | Mod: ORL | Performed by: NURSE PRACTITIONER

## 2021-07-23 PROCEDURE — 36415 COLL VENOUS BLD VENIPUNCTURE: CPT | Mod: ORL | Performed by: NURSE PRACTITIONER

## 2021-07-23 PROCEDURE — 85025 COMPLETE CBC W/AUTO DIFF WBC: CPT | Mod: ORL | Performed by: NURSE PRACTITIONER

## 2021-07-23 RX ORDER — ONDANSETRON 4 MG/1
4 TABLET, FILM COATED ORAL EVERY 8 HOURS PRN
Qty: 30 TABLET | Refills: 3 | Status: SHIPPED | OUTPATIENT
Start: 2021-07-23 | End: 2021-10-28

## 2021-07-23 RX ORDER — TEMOZOLOMIDE 100 MG/1
50 CAPSULE ORAL DAILY
Qty: 28 CAPSULE | Refills: 0 | Status: SHIPPED | OUTPATIENT
Start: 2021-07-23 | End: 2021-10-10

## 2021-07-23 NOTE — TELEPHONE ENCOUNTER
Per Dr. Samuel peña to resume Temozolomide at Metronomic dosing and repeat CBC in1 week.     See Telephone Encounter from BINA Painter for further follow-up/details.    Louise Jerry, JIANN, RN, PHN, OCN  Oncology Care Coordinator  [facility]

## 2021-07-23 NOTE — TELEPHONE ENCOUNTER
Received a call from Oumou at [facility] requesting new Temodar Rx be faxed to [phone].    Temodar prescription faxed.  Instructed Oumou to call back if they don't receive it in a timely manner.    Erich Hodges, BSN, RN, OCN  Oncology Care Coordinator  [facility]

## 2021-07-23 NOTE — ORAL ONC MGMT
Patient's labs resulted today.  Platelets=130k and is ok to start metronomic temozolomide dosing. Prescription sent to [facility].  Spoke with patient's daughter La Nena to review results and plan.  Called [facility] and discussed results and plan with LORNA.  Quentin will start the metronomic temozolomide dosing when prescription arrives.    Inocencio Haley, PharmD.

## 2021-07-23 NOTE — TELEPHONE ENCOUNTER
[facility] Nurse Triage Telephone Note    Provider: KAYCE Ahuja CNP  Facility: [facility]  Facility Type:  [facility]    Caller: Lorena  Call Back Number: [phone]    Allergies:    Allergies   Allergen Reactions     Bacitracin Rash     Bactroban is effective; No difficulties     Erythromycin      intolerant.     Meperidine Hcl      intolerant only   Demerol     Chloraprep One Step Rash        Reason for call: WBC 2.2 (2.7), Hgb 8.6 (9.1), Plt 130 (26). Per pharmacy as plt are 130 can resume chemo tomorrow-will send out med.    Verbal Order/Direction given by Provider: Send results to Oncologist.    Provider giving Order:  KAYCE Tovar CNP     Verbal Order given to: Lorena Rebollar RN

## 2021-07-25 ENCOUNTER — LAB REQUISITION (OUTPATIENT)
Dept: LAB | Facility: CLINIC | Age: 76
End: 2021-07-25
Payer: MEDICARE

## 2021-07-25 DIAGNOSIS — D64.9 ANEMIA, UNSPECIFIED: ICD-10-CM

## 2021-07-26 ENCOUNTER — TELEPHONE (OUTPATIENT)
Dept: GERIATRICS | Facility: CLINIC | Age: 76
End: 2021-07-26

## 2021-07-26 LAB
ANION GAP SERPL CALCULATED.3IONS-SCNC: 2 MMOL/L (ref 3–14)
BUN SERPL-MCNC: 9 MG/DL (ref 7–30)
CALCIUM SERPL-MCNC: 9.1 MG/DL (ref 8.5–10.1)
CHLORIDE BLD-SCNC: 111 MMOL/L (ref 94–109)
CO2 SERPL-SCNC: 29 MMOL/L (ref 20–32)
CREAT SERPL-MCNC: 0.64 MG/DL (ref 0.52–1.04)
GFR SERPL CREATININE-BSD FRML MDRD: 88 ML/MIN/1.73M2
GLUCOSE BLD-MCNC: 133 MG/DL (ref 70–99)
POTASSIUM BLD-SCNC: 3.6 MMOL/L (ref 3.4–5.3)
SODIUM SERPL-SCNC: 142 MMOL/L (ref 133–144)

## 2021-07-26 PROCEDURE — 80048 BASIC METABOLIC PNL TOTAL CA: CPT | Mod: ORL | Performed by: INTERNAL MEDICINE

## 2021-07-26 PROCEDURE — P9604 ONE-WAY ALLOW PRORATED TRIP: HCPCS | Mod: ORL | Performed by: INTERNAL MEDICINE

## 2021-07-26 PROCEDURE — 36415 COLL VENOUS BLD VENIPUNCTURE: CPT | Mod: ORL | Performed by: INTERNAL MEDICINE

## 2021-07-26 NOTE — TELEPHONE ENCOUNTER
[facility] Nurse Triage Telephone Note    Provider: KAYCE Ahuja CNP  Facility: [facility]  Facility Type:  [facility]    Caller: Em  Call Back Number: [phone]    Allergies:    Allergies   Allergen Reactions     Bacitracin Rash     Bactroban is effective; No difficulties     Erythromycin      intolerant.     Meperidine Hcl      intolerant only   Demerol     Chloraprep One Step Rash        Reason for call: Nurse calling to report BMP results.      Verbal Order/Direction given by Provider: Fax results to oncology.  Check BMP on 8/2/21.      Provider giving Order:  KAYCE Ahuja CNP    Verbal Order given to: Em Gayle RN

## 2021-07-26 NOTE — TELEPHONE ENCOUNTER
[facility] Nurse Triage Telephone Note    Provider: KAYCE Ahuja CNP  Facility: [facility]   Facility Type:  [facility]    Caller: Oumou  Call Back Number: [phone]    Allergies   Allergen Reactions     Bacitracin Rash     Bactroban is effective; No difficulties     Erythromycin      intolerant.     Meperidine Hcl      intolerant only   Demerol     Chloraprep One Step Rash       Reason for call: Nurse reports pt had episode of wheezing this morning and it has resolved. Lung sounds clear, slight SOB with exertion. BLE 2+ pitting edema noted to be increased today from baseline and weight increase from 144.6 on 7/21 to 145.8 today. No cough, no PRN neb administered. On Lasix 40mg every day, also Bactrim every day for pneumonia - no end date. Hx of COPD. Oncology ordered BMP for today.   Vitals: BP:  109/72  P:: 92  R:: 17  SPO2: 92% R/A Temp.:  98.2  Wt: 145.8        Verbal Order/Direction given by Provider: JAZLYNO.    Provider giving Order:  KAYCE Ahuja CNP    Verbal Order given to: Oumou Villasenor RN   Our goal is to have forms completed with 72 hours, however some forms may require a visit or additional information.    Who is the form from?: Insurance comp  Where the form came from: Patient or family brought in     What clinic location was the form placed at?: [place]  Where the form was placed: forms bin  What number is listed as a contact on the form?: [phone]    Phone call message- patient request for a letter, form or note:    Date needed: as soon as possible  Please fax to [phone]  Has the patient signed a consent form for release of information? NO    Additional comments:     Call taken on 11/12/2019 at 11:58 AM by Aurelia Hernandez    Type of letter, form or note: medical

## 2021-07-27 NOTE — PROGRESS NOTES
RECORDS STATUS - ALL OTHER DIAGNOSIS      RECORDS RECEIVED FROM: [facility]   DATE RECEIVED: 8/2/2021   NOTES STATUS DETAILS   OFFICE NOTE from referring provider Complete Stephanie Baker MD   OFFICE NOTE from medical oncologist Complete 7/20/2021 GBM     6/22/2021 GBM    More in EPIC   DISCHARGE SUMMARY from hospital     DISCHARGE REPORT from the ER     OPERATIVE REPORT N/A    MEDICATION LIST Complete [facility]   CLINICAL TRIAL TREATMENTS TO DATE     LABS     PATHOLOGY REPORTS N/A    ANYTHING RELATED TO DIAGNOSIS Complete Labs last updated on 7/26/2021    GENONOMIC TESTING     TYPE:     IMAGING (NEED IMAGES & REPORT)     CT SCANS Complete CT Cervical Spine 6/27/2021    CT Head 6/27/2021      MRI Complete MRI Brain 6/22/2021      MAMMO     ULTRASOUND Complete 5/8/2021 US Lower Extremity     4/27/2021 US Lower Extremity     3/26/2021 US Lower Extremity    PET

## 2021-07-28 ENCOUNTER — LAB REQUISITION (OUTPATIENT)
Dept: LAB | Facility: CLINIC | Age: 76
End: 2021-07-28
Payer: MEDICARE

## 2021-07-28 DIAGNOSIS — U07.1 COVID-19: ICD-10-CM

## 2021-07-28 PROCEDURE — U0003 INFECTIOUS AGENT DETECTION BY NUCLEIC ACID (DNA OR RNA); SEVERE ACUTE RESPIRATORY SYNDROME CORONAVIRUS 2 (SARS-COV-2) (CORONAVIRUS DISEASE [COVID-19]), AMPLIFIED PROBE TECHNIQUE, MAKING USE OF HIGH THROUGHPUT TECHNOLOGIES AS DESCRIBED BY CMS-2020-01-R: HCPCS | Mod: ORL | Performed by: NURSE PRACTITIONER

## 2021-07-30 ENCOUNTER — DISCHARGE SUMMARY NURSING HOME (OUTPATIENT)
Dept: GERIATRICS | Facility: CLINIC | Age: 76
End: 2021-07-30
Payer: MEDICARE

## 2021-07-30 VITALS
SYSTOLIC BLOOD PRESSURE: 112 MMHG | HEIGHT: 62 IN | RESPIRATION RATE: 18 BRPM | HEART RATE: 72 BPM | OXYGEN SATURATION: 92 % | TEMPERATURE: 98.8 F | BODY MASS INDEX: 27.36 KG/M2 | WEIGHT: 148.7 LBS | DIASTOLIC BLOOD PRESSURE: 70 MMHG

## 2021-07-30 DIAGNOSIS — F41.9 ANXIETY: ICD-10-CM

## 2021-07-30 DIAGNOSIS — R60.0 LOWER LEG EDEMA: ICD-10-CM

## 2021-07-30 DIAGNOSIS — C71.9 GBM (GLIOBLASTOMA MULTIFORME) (H): Primary | ICD-10-CM

## 2021-07-30 DIAGNOSIS — R53.81 PHYSICAL DECONDITIONING: ICD-10-CM

## 2021-07-30 DIAGNOSIS — G40.909 RECURRENT SEIZURES (H): ICD-10-CM

## 2021-07-30 DIAGNOSIS — F33.9 EPISODE OF RECURRENT MAJOR DEPRESSIVE DISORDER, UNSPECIFIED DEPRESSION EPISODE SEVERITY (H): ICD-10-CM

## 2021-07-30 DIAGNOSIS — K59.09 CHRONIC CONSTIPATION: ICD-10-CM

## 2021-07-30 DIAGNOSIS — I10 ESSENTIAL HYPERTENSION: ICD-10-CM

## 2021-07-30 DIAGNOSIS — K21.9 GASTROESOPHAGEAL REFLUX DISEASE WITHOUT ESOPHAGITIS: ICD-10-CM

## 2021-07-30 DIAGNOSIS — E87.6 HYPOKALEMIA: ICD-10-CM

## 2021-07-30 DIAGNOSIS — D69.6 THROMBOCYTOPENIA (H): ICD-10-CM

## 2021-07-30 DIAGNOSIS — Z95.828 S/P IVC FILTER: ICD-10-CM

## 2021-07-30 DIAGNOSIS — I82.493 ACUTE DEEP VEIN THROMBOSIS (DVT) OF OTHER SPECIFIED VEIN OF BOTH LOWER EXTREMITIES (H): ICD-10-CM

## 2021-07-30 DIAGNOSIS — J44.9 CHRONIC OBSTRUCTIVE PULMONARY DISEASE, UNSPECIFIED COPD TYPE (H): ICD-10-CM

## 2021-07-30 LAB — SARS-COV-2 RNA RESP QL NAA+PROBE: NEGATIVE

## 2021-07-30 PROCEDURE — 99316 NF DSCHRG MGMT 30 MIN+: CPT | Performed by: NURSE PRACTITIONER

## 2021-07-30 RX ORDER — POTASSIUM CHLORIDE 1500 MG/1
40 TABLET, EXTENDED RELEASE ORAL DAILY
Start: 2021-07-30 | End: 2021-12-19

## 2021-07-30 ASSESSMENT — MIFFLIN-ST. JEOR: SCORE: 1122.75

## 2021-07-30 NOTE — PATIENT INSTRUCTIONS
Olga Bailey  YOB: 1945                                 Discharge Date: 8/5/2021   PHYSICIAN's DISCHARGE SUMMARY / ORDER SHEET  1. Discharge to: [facility]  2. Medications:      Current Outpatient Medications   Medication Sig Dispense Refill     acetaminophen (TYLENOL) 325 MG tablet Take 650 mg by mouth every 4 hours as needed for mild pain        albuterol (PROAIR HFA/PROVENTIL HFA/VENTOLIN HFA) 108 (90 Base) MCG/ACT inhaler Inhale 2 puffs into the lungs every 4 hours as needed for wheezing       albuterol (PROVENTIL) (2.5 MG/3ML) 0.083% neb solution Take 1 vial (2.5 mg) by nebulization every 4 hours as needed for wheezing or shortness of breath / dyspnea       amLODIPine (NORVASC) 5 MG tablet Take 1 tablet (5 mg) by mouth daily       apixaban ANTICOAGULANT (ELIQUIS) 5 MG tablet Take 1 tablet (5 mg) by mouth 2 times daily 60 tablet      busPIRone HCl (BUSPAR) 30 MG tablet Take 30 mg by mouth 2 times daily       calcium polycarbophil (FIBERCON) 625 MG tablet Take 1 tablet (625 mg) by mouth daily       docusate sodium (COLACE) 100 MG capsule Take 1 capsule (100 mg) by mouth 2 times daily as needed for constipation       FLUoxetine (PROZAC) 20 MG capsule Take 3 capsules (60 mg) by mouth daily 30 capsule 0     furosemide (LASIX) 40 MG tablet Take 1 tablet (40 mg) by mouth daily 30 tablet 0     levETIRAcetam (KEPPRA) 250 MG tablet Take 5 tablets (1,250 mg) by mouth 2 times daily 60 tablet 0     linaclotide (LINZESS) 290 MCG capsule Take 1 capsule (290 mcg) by mouth every morning (before breakfast)       melatonin 3 MG tablet Take 2 tablets (6 mg) by mouth nightly as needed for sleep 30 tablet 0     ondansetron (ZOFRAN) 4 MG tablet Take 1 tablet (4 mg) by mouth every 8 hours as needed for nausea 30 tablet 3     pantoprazole (PROTONIX) 40 MG EC tablet Take 1 tablet (40 mg) by mouth 2 times daily (before meals) 30 tablet 0     polyethylene glycol (MIRALAX) 17 GM/Dose powder Take 17 g by mouth daily as  needed for constipation 510 g 0     potassium chloride ER (KLOR-CON M) 20 MEQ CR tablet Take 2 tablets (40 mEq) by mouth daily       QUEtiapine (SEROQUEL) 25 MG tablet Take 1 tablet (25 mg) by mouth 2 times daily 30 tablet 0     sennosides (SENOKOT) 8.6 MG tablet Take 1 tablet by mouth 2 times daily as needed for constipation 30 tablet 0     sulfamethoxazole-trimethoprim (BACTRIM) 400-80 MG tablet Take 1 tablet by mouth daily 45 tablet 0     temozolomide (TEMODAR) 100 MG capsule Take 1 capsule (100 mg) by mouth daily for 28 days Take ondansetron 30-60 min before temozolomide. Take at bedtime on an empty stomach. 28 capsule 0     apixaban ANTICOAGULANT (ELIQUIS) 5 MG tablet Take 1 tablet (5 mg) by mouth 2 times daily 60 tablet       DISCHARGE PLAN:    Follow up labs: CBC Monday and Thursday and BMP on Mondays x 3 weeks, then CBC and BMP Mondays x4 weeks through home care and fax results to oncology.     Medical Follow Up:      Follow up with primary care provider in 1-2 weeks   Follow up with specialist: Dr. Baker    Follow up with specialist Dr. Gamboa in 4-6 weeks    [facility] scheduled appointments:  Next 5 appointments (look out 90 days)    Aug 17, 2021  4:00 PM  Return Visit with Stephanie Baker MD  [facility] ([facility] ) [address], [address]  [facility] [address]  [phone]       Discharge Services: Home Care:  Occupational Therapy, Physical Therapy, Registered Nurse, Home Health Aide and From:  [facility]    Discharge Instructions Verbalized to Patient at Discharge:     Tubi  on in AM, off in PM    Daily weight. Notify provider if weight gain >2 lb in 1 day, or >5 lb in 1 week    Regular diet    Discharge patient to home with current medications and treatments  Discharge Home with Home care as above  - Nursing call in 30 days supply of needed meds to pharmacy of choice upon discharge  -  please send home with original of these discharge instructions and copy for chart.       ______________________________  KAYCE Barber [facility]                   7/30/2021
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
## 2021-07-30 NOTE — Clinical Note
Brijesh Gil and Dr. Baker,  Olga Bailey is discharging from [facility] tomorrow and moving to [facility]. I ordered twice weekly labs through [facility] with orders for results to go to you guys. Just wanted to make sure you were aware of this transition. I will no longer be providing her care at the new facility.  Thanks!  Ilsa

## 2021-07-31 ENCOUNTER — TELEPHONE (OUTPATIENT)
Dept: GERIATRICS | Facility: CLINIC | Age: 76
End: 2021-07-31

## 2021-07-31 NOTE — TELEPHONE ENCOUNTER
Weight increase 5lb in past 24h, on lasix 40mg, on 7/30 147, today 152, assessment lungs clear, asymptomatic, edema+, has tubigrips on.  -lymph eval next week  -additional lasix 20mg today and tomorrow at noon  -keep tubigrip on 24/7

## 2021-08-01 ENCOUNTER — LAB REQUISITION (OUTPATIENT)
Dept: LAB | Facility: CLINIC | Age: 76
End: 2021-08-01
Payer: MEDICARE

## 2021-08-01 DIAGNOSIS — E87.6 HYPOKALEMIA: ICD-10-CM

## 2021-08-02 ENCOUNTER — PRE VISIT (OUTPATIENT)
Dept: ONCOLOGY | Facility: CLINIC | Age: 76
End: 2021-08-02

## 2021-08-02 ENCOUNTER — ONCOLOGY VISIT (OUTPATIENT)
Dept: ONCOLOGY | Facility: CLINIC | Age: 76
End: 2021-08-02
Attending: PSYCHIATRY & NEUROLOGY
Payer: MEDICARE

## 2021-08-02 ENCOUNTER — LAB (OUTPATIENT)
Dept: INFUSION THERAPY | Facility: CLINIC | Age: 76
End: 2021-08-02
Attending: INTERNAL MEDICINE
Payer: MEDICARE

## 2021-08-02 ENCOUNTER — TELEPHONE (OUTPATIENT)
Dept: ONCOLOGY | Facility: CLINIC | Age: 76
End: 2021-08-02

## 2021-08-02 ENCOUNTER — TELEPHONE (OUTPATIENT)
Dept: GERIATRICS | Facility: CLINIC | Age: 76
End: 2021-08-02

## 2021-08-02 VITALS
HEIGHT: 63 IN | TEMPERATURE: 98.8 F | SYSTOLIC BLOOD PRESSURE: 105 MMHG | WEIGHT: 156.8 LBS | HEART RATE: 95 BPM | DIASTOLIC BLOOD PRESSURE: 71 MMHG | OXYGEN SATURATION: 95 % | BODY MASS INDEX: 27.78 KG/M2 | RESPIRATION RATE: 16 BRPM

## 2021-08-02 DIAGNOSIS — T45.1X5A CHEMOTHERAPY-INDUCED NAUSEA AND VOMITING: ICD-10-CM

## 2021-08-02 DIAGNOSIS — T45.1X5A ANTINEOPLASTIC CHEMOTHERAPY INDUCED PANCYTOPENIA (H): ICD-10-CM

## 2021-08-02 DIAGNOSIS — D61.810 ANTINEOPLASTIC CHEMOTHERAPY INDUCED PANCYTOPENIA (H): ICD-10-CM

## 2021-08-02 DIAGNOSIS — I82.599 CHRONIC DEEP VEIN THROMBOSIS (DVT) OF OTHER VEIN OF LOWER EXTREMITY, UNSPECIFIED LATERALITY (H): Primary | ICD-10-CM

## 2021-08-02 DIAGNOSIS — R11.2 CHEMOTHERAPY-INDUCED NAUSEA AND VOMITING: ICD-10-CM

## 2021-08-02 LAB
ALBUMIN SERPL-MCNC: 3.5 G/DL (ref 3.4–5)
ALP SERPL-CCNC: 83 U/L (ref 40–150)
ALT SERPL W P-5'-P-CCNC: 20 U/L (ref 0–50)
ANION GAP SERPL CALCULATED.3IONS-SCNC: 5 MMOL/L (ref 3–14)
ANION GAP SERPL CALCULATED.3IONS-SCNC: 5 MMOL/L (ref 3–14)
AST SERPL W P-5'-P-CCNC: 18 U/L (ref 0–45)
BASOPHILS # BLD MANUAL: 0 10E3/UL (ref 0–0.2)
BASOPHILS NFR BLD MANUAL: 0 %
BILIRUB SERPL-MCNC: 0.6 MG/DL (ref 0.2–1.3)
BUN SERPL-MCNC: 11 MG/DL (ref 7–30)
BUN SERPL-MCNC: 9 MG/DL (ref 7–30)
CALCIUM SERPL-MCNC: 9.3 MG/DL (ref 8.5–10.1)
CALCIUM SERPL-MCNC: 9.5 MG/DL (ref 8.5–10.1)
CHLORIDE BLD-SCNC: 106 MMOL/L (ref 94–109)
CHLORIDE BLD-SCNC: 107 MMOL/L (ref 94–109)
CO2 SERPL-SCNC: 28 MMOL/L (ref 20–32)
CO2 SERPL-SCNC: 30 MMOL/L (ref 20–32)
CREAT SERPL-MCNC: 0.79 MG/DL (ref 0.52–1.04)
CREAT SERPL-MCNC: 0.79 MG/DL (ref 0.52–1.04)
EOSINOPHIL # BLD MANUAL: 0.1 10E3/UL (ref 0–0.7)
EOSINOPHIL NFR BLD MANUAL: 2 %
ERYTHROCYTE [DISTWIDTH] IN BLOOD BY AUTOMATED COUNT: 15.1 % (ref 10–15)
FRAGMENTS BLD QL SMEAR: SLIGHT
GFR SERPL CREATININE-BSD FRML MDRD: 73 ML/MIN/1.73M2
GFR SERPL CREATININE-BSD FRML MDRD: 73 ML/MIN/1.73M2
GLUCOSE BLD-MCNC: 101 MG/DL (ref 70–99)
GLUCOSE BLD-MCNC: 109 MG/DL (ref 70–99)
HCT VFR BLD AUTO: 33.2 % (ref 35–47)
HGB BLD-MCNC: 10.9 G/DL (ref 11.7–15.7)
LYMPHOCYTES # BLD MANUAL: 0.4 10E3/UL (ref 0.8–5.3)
LYMPHOCYTES NFR BLD MANUAL: 14 %
MCH RBC QN AUTO: 31.2 PG (ref 26.5–33)
MCHC RBC AUTO-ENTMCNC: 32.8 G/DL (ref 31.5–36.5)
MCV RBC AUTO: 95 FL (ref 78–100)
MONOCYTES # BLD MANUAL: 0.3 10E3/UL (ref 0–1.3)
MONOCYTES NFR BLD MANUAL: 10 %
NEUTROPHILS # BLD MANUAL: 2.2 10E3/UL (ref 1.6–8.3)
NEUTROPHILS NFR BLD MANUAL: 74 %
PLAT MORPH BLD: ABNORMAL
PLATELET # BLD AUTO: 383 10E3/UL (ref 150–450)
POTASSIUM BLD-SCNC: 3.4 MMOL/L (ref 3.4–5.3)
POTASSIUM BLD-SCNC: 3.6 MMOL/L (ref 3.4–5.3)
PROT SERPL-MCNC: 6.9 G/DL (ref 6.8–8.8)
RBC # BLD AUTO: 3.49 10E6/UL (ref 3.8–5.2)
RBC MORPH BLD: ABNORMAL
SODIUM SERPL-SCNC: 140 MMOL/L (ref 133–144)
SODIUM SERPL-SCNC: 141 MMOL/L (ref 133–144)
WBC # BLD AUTO: 3 10E3/UL (ref 4–11)

## 2021-08-02 PROCEDURE — 85014 HEMATOCRIT: CPT | Performed by: PSYCHIATRY & NEUROLOGY

## 2021-08-02 PROCEDURE — 99215 OFFICE O/P EST HI 40 MIN: CPT | Performed by: INTERNAL MEDICINE

## 2021-08-02 PROCEDURE — 36415 COLL VENOUS BLD VENIPUNCTURE: CPT

## 2021-08-02 PROCEDURE — G0463 HOSPITAL OUTPT CLINIC VISIT: HCPCS

## 2021-08-02 PROCEDURE — 82040 ASSAY OF SERUM ALBUMIN: CPT | Performed by: PSYCHIATRY & NEUROLOGY

## 2021-08-02 PROCEDURE — 36415 COLL VENOUS BLD VENIPUNCTURE: CPT | Mod: ORL | Performed by: NURSE PRACTITIONER

## 2021-08-02 PROCEDURE — P9604 ONE-WAY ALLOW PRORATED TRIP: HCPCS | Mod: ORL | Performed by: NURSE PRACTITIONER

## 2021-08-02 PROCEDURE — 80053 COMPREHEN METABOLIC PANEL: CPT | Mod: ORL | Performed by: NURSE PRACTITIONER

## 2021-08-02 ASSESSMENT — PAIN SCALES - GENERAL: PAINLEVEL: NO PAIN (0)

## 2021-08-02 ASSESSMENT — MIFFLIN-ST. JEOR: SCORE: 1175.21

## 2021-08-02 NOTE — PROGRESS NOTES
"Oncology Rooming Note    August 2, 2021 1:51 PM   Olga Bailey is a 75 year old female who presents for:    Chief Complaint   Patient presents with     Oncology Clinic Visit     Initial Vitals: There were no vitals taken for this visit. Estimated body mass index is 27.2 kg/m  as calculated from the following:    Height as of 7/30/21: 1.575 m (5' 2\").    Weight as of 7/30/21: 67.4 kg (148 lb 11.2 oz). There is no height or weight on file to calculate BSA.  Data Unavailable Comment: Data Unavailable   No LMP recorded. Patient has had a hysterectomy.  Allergies reviewed: Yes  Medications reviewed: Yes    Medications: Medication refills not needed today.  Pharmacy name entered into [facility]:    [id number] - [place], [place] - [address] AT [facility] [address]  [facility] - [place], [place] - [address]    Clinical concerns: None       Olga Mishra MA              "

## 2021-08-02 NOTE — PATIENT INSTRUCTIONS
1.  Start Eliquis 5 mg twice a day.  2.  CBC twice a week for next 3 weeks.  After that, once a week for another 4 weeks.  3.  See me in 4 to 6 weeks.

## 2021-08-02 NOTE — TELEPHONE ENCOUNTER
FGS Nurse Triage Telephone Note    Provider: KAYCE Ahuja CNP  Facility: [facility]  Facility Type:  [facility]    Caller: Lenny  Call Back Number: [phone]    Allergies:    Allergies   Allergen Reactions     Bacitracin Rash     Bactroban is effective; No difficulties     Erythromycin      intolerant.     Meperidine Hcl      intolerant only   Demerol     Chloraprep One Step Rash        Reason for call: Nurse calling to report Heme 1 and CMP results.      Verbal Order/Direction given by Provider: Fax lab results to oncology.      Provider giving Order:  KAYCE Ahuja CNP    Verbal Order given to: Lenny Gayle RN

## 2021-08-02 NOTE — PROGRESS NOTES
Medical Assistant Note:  Olga GOTTI Bailey presents today for blood draw.    Patient seen by provider today: Yes: slava.   present during visit today: Not Applicable.    Concerns: No Concerns.    Procedure:  Lab draw site: rac, Needle type: bf, Gauge: 23.    Post Assessment:  Labs drawn without difficulty: Yes.    Discharge Plan:  Departure Mode: Ambulatory.    Face to Face Time: 5 min  .    Etelvina Patino, CMA

## 2021-08-04 ENCOUNTER — LAB REQUISITION (OUTPATIENT)
Dept: LAB | Facility: CLINIC | Age: 76
End: 2021-08-04
Payer: MEDICARE

## 2021-08-04 DIAGNOSIS — C71.9 MALIGNANT NEOPLASM OF BRAIN, UNSPECIFIED (H): ICD-10-CM

## 2021-08-06 ENCOUNTER — DOCUMENTATION ONLY (OUTPATIENT)
Dept: PHARMACY | Facility: CLINIC | Age: 76
End: 2021-08-06

## 2021-08-06 NOTE — PROGRESS NOTES
"Oral Chemotherapy Monitoring Program    Subjective/Objective:   Olga Bailey is a 75 year old female contacted by phone for an initial visit for oral chemotherapy education. The patient was transferred to a new assisted living community on 08/06/21. Spoke with Nurse Director, Yudy, and answered all questions.    ORAL CHEMOTHERAPY 3/26/2021 6/22/2021 8/2/2021 8/6/2021   Assessment Type New Teach New Teach Lab Monitoring New Teach   Diagnosis Code Brain Cancer - Glioblastoma Brain Cancer - Glioblastoma Brain Cancer - Glioblastoma Brain Cancer - Glioblastoma   Providers Samuel Baker   Clinic Name/Location [facility]   Drug Name Temodar (temozolomide) Temodar (temozolomide) Temodar (temozolomide) Temodar (temozolomide)   Dose 140 mg 250 mg 100 mg 100 mg   Current Schedule Daily Daily Daily Daily   Cycle Details Continuous for 42 days during XRT 5 days on, 23 days off Continuous Continuous   Start Date of Last Cycle - - 7/23/2021 7/23/2021   Planned next cycle start date - 6/24/2021 - -   Any new drug interactions? - No - -   Is the dose as ordered appropriate for the patient? - Yes - -       Last PHQ-2 Score on record: No flowsheet data found.    Vitals:  BP:   BP Readings from Last 1 Encounters:   08/02/21 105/71     Wt Readings from Last 1 Encounters:   08/02/21 71.1 kg (156 lb 12.8 oz)     Estimated body surface area is 1.78 meters squared as calculated from the following:    Height as of 8/2/21: 1.6 m (5' 2.99\").    Weight as of 8/2/21: 71.1 kg (156 lb 12.8 oz).    Labs:  _  Result Component Current Result Ref Range   Sodium 140 (8/2/2021) 133 - 144 mmol/L     _  Result Component Current Result Ref Range   Potassium 3.6 (8/2/2021) 3.4 - 5.3 mmol/L     _  Result Component Current Result Ref Range   Calcium 9.5 (8/2/2021) 8.5 - 10.1 mg/dL     _  Result Component Current Result Ref Range   Magnesium 1.8 (7/8/2021) 1.6 - 2.3 mg/dL     No results found for Phos within last " 30 days.     _  Result Component Current Result Ref Range   Albumin 3.5 (8/2/2021) 3.4 - 5.0 g/dL     _  Result Component Current Result Ref Range   Urea Nitrogen 11 (8/2/2021) 7 - 30 mg/dL     _  Result Component Current Result Ref Range   Creatinine 0.79 (8/2/2021) 0.52 - 1.04 mg/dL     _  Result Component Current Result Ref Range   AST 18 (8/2/2021) 0 - 45 U/L     _  Result Component Current Result Ref Range   ALT 20 (8/2/2021) 0 - 50 U/L     _  Result Component Current Result Ref Range   Bilirubin Total 0.6 (8/2/2021) 0.2 - 1.3 mg/dL     _  Result Component Current Result Ref Range   WBC Count 3.0 (L) (8/2/2021) 4.0 - 11.0 10e3/uL     _  Result Component Current Result Ref Range   Hemoglobin 10.9 (L) (8/2/2021) 11.7 - 15.7 g/dL     _  Result Component Current Result Ref Range   Platelet Count 383 (8/2/2021) 150 - 450 10e3/uL     _  Result Component Current Result Ref Range   Absolute Neutrophils 2.2 (8/2/2021) 1.6 - 8.3 10e3/uL       Assessment:  Patient is appropriate to start therapy.    Plan:  Basic chemotherapy teaching was reviewed with the patient and the patient's caregiver including indication, start date of therapy, dose, administration, adverse effects, missed doses, food and drug interactions, monitoring, side effect management, office contact information, and safe handling. Written materials were provided and all questions answered.    Follow-Up:  1 week with follow-up phone call.     Rachel Moniz, Pharmacist Intern  August 05, 2021    Brigid Mcintyre PharmD  August 6, 2021

## 2021-08-07 NOTE — PROGRESS NOTES
HEMATOLOGY/ONCOLOGY HISTORY:  Ms. Bailey is a female with DVT and GBM.  1. CT chest angiogram on 03/26/2021 did not reveal any PE.  -Lower extremity ultrasound on 03/29/2021 revealed bilateral lower extremity DVT.    -The patient was started on anticoagulation with heparin and transitioned to Lovenox.    -The patient's hemoglobin decreased.  CT abdomen and pelvis on 04/14/2021 revealed large right retroperitoneal hematoma.  Anticoagulation was stopped.    -IVC filter was placed on 04/16/2021.  2. Eliquis started on 08/02/2021. Could not be started earlier because of temodar induced thrombocytopenia.    SUBJECTIVE:  Ms. Bailey is a 75-year-old female with a DVT and GBM.     She was diagnosed with bilateral lower extremity DVT on 03/29/2021.  She was on anticoagulation with Lovenox.  The patient developed retroperitoneal hematoma in about 2 weeks after starting anticoagulation.  Anticoagulation was stopped and she had IVC filter placed.  Filter is still there.     Plan was to resume the anticoagulation.  Anticoagulation has not been started because of thrombocytopenia.     The patient has GBM and follows up with Dr. Baker.  The patient was started on temozolomide on 06/24/2021.  She got it for 5 days.  Her platelet was 305 on 06/24/2021.  It decreased to 26 on 07/19/2021.  The patient did not resume anticoagulation because of that.  The patient is now on metronomic dosing with temozolomide.  She was started on 100 mg daily on 07/24/2021.     The patient was brought to the clinic by her daughter, who is a nurse.  The patient's overall condition is stable.  She does have some leg swelling, but no leg pain.  No bleeding from any site.  No black stool.     Some headache.  Some dizziness.  No chest pain or shortness of breath. No cough.  No nausea or vomiting.     PHYSICAL EXAMINATION:    GENERAL:  She is alert and oriented x 3.  VITAL SIGNS: Reviewed.   Rest of the systems not examined.     LABORATORY:  CBC and CMP were  reviewed.     ASSESSMENT:    1.  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  2.  GBM on temozolomide.  3.  Pancytopenia from temozolomide.     PLAN:    1.  Discussed regarding her thrombosis.  Her thrombosis provoked from malignancy.  The patient is not on anticoagulation.  She has an IVC filter.  IVC filter will help reduce the risk of pulmonary embolism, but it is not going to help with DVT.  I explained to the patient that her DVT can get worse without anticoagulation.  Because of that, I would recommend starting anticoagulation.  Labs today revealed a normal platelet.  We will start her on Eliquis 5 mg twice a day.  No loading dose because of her previous bleeding and severe thrombocytopenia.  We will monitor CBC twice a week.  As long as platelet is above 50 we are okay to continue anticoagulation.  I explained to the patient that if she tolerates anticoagulation well without bleeding and if her platelet remains above 50, we will plan on getting an IVC filter removed in the near future.  She is agreeable for it.  2.  The patient's WBC and hemoglobin are also improving.  Those will be monitored.  3.  I will see her in 4-6 weeks' time.  Daughter had few questions, which were all answered.     TOTAL FACE-TO-FACE TIME SPENT:  40 minutes, more than 50% of the time spent in counseling and coordination of care.

## 2021-08-08 ENCOUNTER — LAB REQUISITION (OUTPATIENT)
Dept: LAB | Facility: CLINIC | Age: 76
End: 2021-08-08
Payer: MEDICARE

## 2021-08-08 DIAGNOSIS — C71.9 MALIGNANT NEOPLASM OF BRAIN, UNSPECIFIED (H): ICD-10-CM

## 2021-08-09 ENCOUNTER — TELEPHONE (OUTPATIENT)
Dept: GERIATRICS | Facility: CLINIC | Age: 76
End: 2021-08-09

## 2021-08-09 NOTE — TELEPHONE ENCOUNTER
FGS Nurse Triage Telephone Note    Provider: KAYCE Ahuja CNP  Facility:  [facility]     Caller: Pedro WEBSTER [facility]  Call Back Number: [phone]    Allergies:    Allergies   Allergen Reactions     Bacitracin Rash     Bactroban is effective; No difficulties     Erythromycin      intolerant.     Meperidine Hcl      intolerant only   Demerol     Chloraprep One Step Rash        Reason for call: Requesting HC Skilled Nsg visits weekly  For 6 weeks for monitoring LE edema & medication education.  Also request orders for PT & OT Eval &Tx.    Verbal Order/Direction given by Provider:  Okay for startup of HC X 2 weeks & PT & OT Eval & Tx. But pt discharged from our care & to followup with her Primary MD at [facility] in [place]., & get further orders from them.    Provider giving Order:  KAYCE Ahuja CNP    Verbal Order given to: Pedro Rebollar RN

## 2021-08-11 ENCOUNTER — LAB REQUISITION (OUTPATIENT)
Dept: LAB | Facility: CLINIC | Age: 76
End: 2021-08-11
Payer: MEDICARE

## 2021-08-11 DIAGNOSIS — C71.9 MALIGNANT NEOPLASM OF BRAIN, UNSPECIFIED (H): ICD-10-CM

## 2021-08-12 ENCOUNTER — LAB REQUISITION (OUTPATIENT)
Dept: LAB | Facility: CLINIC | Age: 76
End: 2021-08-12
Payer: MEDICARE

## 2021-08-12 ENCOUNTER — PATIENT OUTREACH (OUTPATIENT)
Dept: ONCOLOGY | Facility: CLINIC | Age: 76
End: 2021-08-12

## 2021-08-12 DIAGNOSIS — I82.599: ICD-10-CM

## 2021-08-12 DIAGNOSIS — B59 PNEUMONIA OF BOTH LUNGS DUE TO PNEUMOCYSTIS JIROVECII, UNSPECIFIED PART OF LUNG (H): ICD-10-CM

## 2021-08-12 DIAGNOSIS — G40.909 RECURRENT SEIZURES (H): ICD-10-CM

## 2021-08-12 DIAGNOSIS — C71.9 GBM (GLIOBLASTOMA MULTIFORME) (H): ICD-10-CM

## 2021-08-12 DIAGNOSIS — C71.9 GLIOBLASTOMA (H): Primary | ICD-10-CM

## 2021-08-12 RX ORDER — TEMOZOLOMIDE 100 MG/1
50 CAPSULE ORAL DAILY
Qty: 28 CAPSULE | Refills: 0 | Status: SHIPPED | OUTPATIENT
Start: 2021-08-12 | End: 2021-10-10

## 2021-08-12 RX ORDER — LEVETIRACETAM 250 MG/1
1250 TABLET ORAL 2 TIMES DAILY
Qty: 60 TABLET | Refills: 0 | Status: ON HOLD | OUTPATIENT
Start: 2021-08-12 | End: 2022-04-05

## 2021-08-12 NOTE — TELEPHONE ENCOUNTER
"ELEANOR Grier at [facility] calling to report Olga is completely out of:     1. Keppra. Per last visit note, continue Keppra (dose confirmed in 6/22 visit note). Rx refilled as requested.  2. Bactrim: Rx routed to Dr. Baker to review and advise if patient should continue and sign refill if appropriate. Confirmed with ELEANOR Grier patient is not currently on Dexamethasone.   3. Temozolomide: Working with pharmacy to refill TMZ. Per ELEANOR Grier ok to fill through [facility]. SP will contact [facility] to arrange delivery at [phone]    Addendum: Per Dr. Baker,   \"Can stop Bactrim.   Thanks,   Stephanie Baker MD   Neuro-oncology   8/12/2021\"    8/13/21: Attempted to reach Marvel x2 with recommendations. Left detailed VM on nurse line with recommendations to stop Bactrim and call if further questions. Confirmed with pharmacy team that TMZ was delivered 8/12 to [facility].    Louise Jerry, BSN, RN, PHN, OCN  Oncology Care Coordinator  [facility]      "

## 2021-08-12 NOTE — PROGRESS NOTES
Writer contacted [facility] as it appears labs have not been drawn as ordered. Last labs viewable in chart were 8/2.     Spoke with ELEANOR Jimenes at [facility] who reports twice weekly labs have not been drawn as ordered by Dr. Gamboa. Lab orders re-faxed Attn: Yudy. She will have labs drawn tomorrow AM and they should be viewable in HE system.     Fax# [phone]    Will continue to follow-up as needed.     Louise Jerry, JIANN, RN, PHN, OCN  Oncology Care Coordinator  [facility]

## 2021-08-13 ENCOUNTER — PATIENT OUTREACH (OUTPATIENT)
Dept: ONCOLOGY | Facility: CLINIC | Age: 76
End: 2021-08-13

## 2021-08-13 DIAGNOSIS — E87.6 HYPOKALEMIA: Primary | ICD-10-CM

## 2021-08-13 LAB
ANION GAP SERPL CALCULATED.3IONS-SCNC: 13 MMOL/L (ref 5–18)
BASOPHILS # BLD AUTO: 0 10E3/UL (ref 0–0.2)
BASOPHILS NFR BLD AUTO: 0 %
BUN SERPL-MCNC: 14 MG/DL (ref 8–28)
CALCIUM SERPL-MCNC: 10.2 MG/DL (ref 8.5–10.5)
CHLORIDE BLD-SCNC: 100 MMOL/L (ref 98–107)
CO2 SERPL-SCNC: 28 MMOL/L (ref 22–31)
CREAT SERPL-MCNC: 0.83 MG/DL (ref 0.6–1.1)
EOSINOPHIL # BLD AUTO: 0.3 10E3/UL (ref 0–0.7)
EOSINOPHIL NFR BLD AUTO: 3 %
ERYTHROCYTE [DISTWIDTH] IN BLOOD BY AUTOMATED COUNT: 14.8 % (ref 10–15)
GFR SERPL CREATININE-BSD FRML MDRD: 69 ML/MIN/1.73M2
GLUCOSE BLD-MCNC: 124 MG/DL (ref 70–125)
HCT VFR BLD AUTO: 37.1 % (ref 35–47)
HGB BLD-MCNC: 11.6 G/DL (ref 11.7–15.7)
IMM GRANULOCYTES # BLD: 0 10E3/UL
IMM GRANULOCYTES NFR BLD: 1 %
LYMPHOCYTES # BLD AUTO: 0.5 10E3/UL (ref 0.8–5.3)
LYMPHOCYTES NFR BLD AUTO: 6 %
MCH RBC QN AUTO: 31.1 PG (ref 26.5–33)
MCHC RBC AUTO-ENTMCNC: 31.3 G/DL (ref 31.5–36.5)
MCV RBC AUTO: 100 FL (ref 78–100)
MONOCYTES # BLD AUTO: 0.9 10E3/UL (ref 0–1.3)
MONOCYTES NFR BLD AUTO: 11 %
NEUTROPHILS # BLD AUTO: 6.6 10E3/UL (ref 1.6–8.3)
NEUTROPHILS NFR BLD AUTO: 79 %
NRBC # BLD AUTO: 0 10E3/UL
NRBC BLD AUTO-RTO: 0 /100
PLATELET # BLD AUTO: 390 10E3/UL (ref 150–450)
POTASSIUM BLD-SCNC: 3.1 MMOL/L (ref 3.5–5)
RBC # BLD AUTO: 3.73 10E6/UL (ref 3.8–5.2)
SODIUM SERPL-SCNC: 141 MMOL/L (ref 136–145)
WBC # BLD AUTO: 8.3 10E3/UL (ref 4–11)

## 2021-08-13 PROCEDURE — 85025 COMPLETE CBC W/AUTO DIFF WBC: CPT | Mod: ORL | Performed by: INTERNAL MEDICINE

## 2021-08-13 PROCEDURE — 36415 COLL VENOUS BLD VENIPUNCTURE: CPT | Mod: ORL | Performed by: INTERNAL MEDICINE

## 2021-08-13 PROCEDURE — 80048 BASIC METABOLIC PNL TOTAL CA: CPT | Mod: ORL | Performed by: INTERNAL MEDICINE

## 2021-08-13 PROCEDURE — P9604 ONE-WAY ALLOW PRORATED TRIP: HCPCS | Mod: ORL | Performed by: INTERNAL MEDICINE

## 2021-08-13 RX ORDER — SULFAMETHOXAZOLE AND TRIMETHOPRIM 400; 80 MG/1; MG/1
1 TABLET ORAL DAILY
Qty: 30 TABLET | Refills: 3 | Status: CANCELLED | OUTPATIENT
Start: 2021-08-13

## 2021-08-13 NOTE — PROGRESS NOTES
"Per Richmond Naranjo, ADEBAYO,  \"Potassium is 3.1   It looks like she is taking potassium 40 mEq p.o. at home   Please asked patient to take additional 40 mEq today before she goes to bed   Schedule for potassium check on Tuesday when she comes to see Dr. Baker'    Writer attempted to contact [facility] with lab results/ recommendations. Left detailed message with recommendations on nurse . Requested call back confirming plan.    Addendum 8/16/21: No call back from [facility]. Labs checked today 8/16 and K 4.1. Will cancel lab appt for tomorrow since labs were drawn at [facility]. Left VM notifying [place] labs will be cancelled for 8/17. Will keep MRI/follow-up with Dr. Baker as scheduled.    Louise Jerry, BSN, RN, PHN, OCN  Oncology Care Coordinator  [facility]          "

## 2021-08-15 ENCOUNTER — LAB REQUISITION (OUTPATIENT)
Dept: LAB | Facility: CLINIC | Age: 76
End: 2021-08-15
Payer: MEDICARE

## 2021-08-15 DIAGNOSIS — C71.9 MALIGNANT NEOPLASM OF BRAIN, UNSPECIFIED (H): ICD-10-CM

## 2021-08-16 ENCOUNTER — TELEPHONE (OUTPATIENT)
Dept: PHARMACY | Facility: CLINIC | Age: 76
End: 2021-08-16

## 2021-08-16 LAB
ANION GAP SERPL CALCULATED.3IONS-SCNC: 11 MMOL/L (ref 5–18)
BASOPHILS # BLD AUTO: 0 10E3/UL (ref 0–0.2)
BASOPHILS NFR BLD AUTO: 0 %
BUN SERPL-MCNC: 14 MG/DL (ref 8–28)
CALCIUM SERPL-MCNC: 10 MG/DL (ref 8.5–10.5)
CHLORIDE BLD-SCNC: 105 MMOL/L (ref 98–107)
CO2 SERPL-SCNC: 27 MMOL/L (ref 22–31)
CREAT SERPL-MCNC: 0.76 MG/DL (ref 0.6–1.1)
EOSINOPHIL # BLD AUTO: 0.3 10E3/UL (ref 0–0.7)
EOSINOPHIL NFR BLD AUTO: 4 %
ERYTHROCYTE [DISTWIDTH] IN BLOOD BY AUTOMATED COUNT: 14.9 % (ref 10–15)
GFR SERPL CREATININE-BSD FRML MDRD: 77 ML/MIN/1.73M2
GLUCOSE BLD-MCNC: 96 MG/DL (ref 70–125)
HCT VFR BLD AUTO: 34 % (ref 35–47)
HGB BLD-MCNC: 10.5 G/DL (ref 11.7–15.7)
IMM GRANULOCYTES # BLD: 0 10E3/UL
IMM GRANULOCYTES NFR BLD: 0 %
LYMPHOCYTES # BLD AUTO: 0.5 10E3/UL (ref 0.8–5.3)
LYMPHOCYTES NFR BLD AUTO: 7 %
MCH RBC QN AUTO: 30.2 PG (ref 26.5–33)
MCHC RBC AUTO-ENTMCNC: 30.9 G/DL (ref 31.5–36.5)
MCV RBC AUTO: 98 FL (ref 78–100)
MONOCYTES # BLD AUTO: 0.8 10E3/UL (ref 0–1.3)
MONOCYTES NFR BLD AUTO: 12 %
NEUTROPHILS # BLD AUTO: 5.3 10E3/UL (ref 1.6–8.3)
NEUTROPHILS NFR BLD AUTO: 77 %
NRBC # BLD AUTO: 0 10E3/UL
NRBC BLD AUTO-RTO: 0 /100
PLATELET # BLD AUTO: 334 10E3/UL (ref 150–450)
POTASSIUM BLD-SCNC: 4.1 MMOL/L (ref 3.5–5)
RBC # BLD AUTO: 3.48 10E6/UL (ref 3.8–5.2)
SODIUM SERPL-SCNC: 143 MMOL/L (ref 136–145)
WBC # BLD AUTO: 7 10E3/UL (ref 4–11)

## 2021-08-16 PROCEDURE — 36415 COLL VENOUS BLD VENIPUNCTURE: CPT | Mod: ORL | Performed by: INTERNAL MEDICINE

## 2021-08-16 PROCEDURE — 80048 BASIC METABOLIC PNL TOTAL CA: CPT | Mod: ORL | Performed by: INTERNAL MEDICINE

## 2021-08-16 PROCEDURE — 85025 COMPLETE CBC W/AUTO DIFF WBC: CPT | Mod: ORL | Performed by: INTERNAL MEDICINE

## 2021-08-16 PROCEDURE — P9603 ONE-WAY ALLOW PRORATED MILES: HCPCS | Mod: ORL | Performed by: INTERNAL MEDICINE

## 2021-08-16 NOTE — PROGRESS NOTES
NEURO-ONCOLOGY VISIT  Aug 17, 2021    CHIEF COMPLAINT: Ms. Olga Bailey is a 75 year old right-handed woman with a left frontoparietal glioblastoma (IDH wild-type, MGMT promoter methylated), diagnosed following gross total resection on 2/23/2021. Initiation of upfront cancer-directed treatment was delayed due to a complicated hospitalization. Given a resolving infection and lowered functional status, radiation alone was initiated on 5/4/2021 and completed on 5/27/2021.     Olga started adjuvant therapy with 150mg/m2 dosing of temozolomide, however, cycle 1 was complicated by significant hematologic toxicity. Now she is on metronomic dosing and this is well tolerated. Imaging from 8/2021 demonstrates positive treatment effect.    Olga is presenting in follow-up accompanied by Rita (daughter) + Dario (son).    HISTORY OF PRESENT ILLNESS  -Olga recently moved to a new care center.  -She can walk on her own with a walker only used inconsistently, being more cautious in new facility. Right-sided neglect and right-sided weakness is stable to improving. No recent falls.  -Overall, tolerating daily dosed temozolomide well. Nausea is well controlled on supportive medications, intermittent mild constipation, but also some loose stools on current bowel regimen. Labs have remained stable.   -Energy is mediocre. Sleeping okay, getting used to new living situation.  -Mental status is stable remaining a little slowed and easily confused. Somewhat impaired attention and easily forgetful. Needing more assistance with complex decision making.  -Less frequent left V2 division facial pain; only occasionally occurring when going to bed. Sharp pain, 8/10 in intensity. She has acetaminophen as needed.   -No headaches recently.  -Denies changes in sensation.  -Off dexamethasone.   -No recurrent seizures since her last visit with me, tolerating Keppra.  -Saw Dr. Gamboa; restarted anticoagulation.    REVIEW OF  SYSTEMS  A comprehensive ROS negative except as in HPI.    MEDICATIONS   Current Outpatient Medications   Medication     acetaminophen (TYLENOL) 325 MG tablet     albuterol (PROAIR HFA/PROVENTIL HFA/VENTOLIN HFA) 108 (90 Base) MCG/ACT inhaler     albuterol (PROVENTIL) (2.5 MG/3ML) 0.083% neb solution     amLODIPine (NORVASC) 5 MG tablet     apixaban ANTICOAGULANT (ELIQUIS) 5 MG tablet     apixaban ANTICOAGULANT (ELIQUIS) 5 MG tablet     busPIRone HCl (BUSPAR) 30 MG tablet     calcium polycarbophil (FIBERCON) 625 MG tablet     docusate sodium (COLACE) 100 MG capsule     FLUoxetine (PROZAC) 20 MG capsule     furosemide (LASIX) 40 MG tablet     levETIRAcetam (KEPPRA) 250 MG tablet     linaclotide (LINZESS) 290 MCG capsule     melatonin 3 MG tablet     ondansetron (ZOFRAN) 4 MG tablet     pantoprazole (PROTONIX) 40 MG EC tablet     polyethylene glycol (MIRALAX) 17 GM/Dose powder     potassium chloride ER (KLOR-CON M) 20 MEQ CR tablet     QUEtiapine (SEROQUEL) 25 MG tablet     sennosides (SENOKOT) 8.6 MG tablet     temozolomide (TEMODAR) 100 MG capsule     temozolomide (TEMODAR) 100 MG capsule     No current facility-administered medications for this visit.     Current Outpatient Medications   Medication Sig Dispense Refill     acetaminophen (TYLENOL) 325 MG tablet Take 650 mg by mouth every 4 hours as needed for mild pain        albuterol (PROAIR HFA/PROVENTIL HFA/VENTOLIN HFA) 108 (90 Base) MCG/ACT inhaler Inhale 2 puffs into the lungs every 4 hours as needed for wheezing       albuterol (PROVENTIL) (2.5 MG/3ML) 0.083% neb solution Take 1 vial (2.5 mg) by nebulization every 4 hours as needed for wheezing or shortness of breath / dyspnea       amLODIPine (NORVASC) 5 MG tablet Take 1 tablet (5 mg) by mouth daily       apixaban ANTICOAGULANT (ELIQUIS) 5 MG tablet Take 1 tablet (5 mg) by mouth 2 times daily 60 tablet      apixaban ANTICOAGULANT (ELIQUIS) 5 MG tablet Take 1 tablet (5 mg) by mouth 2 times daily 60 tablet       busPIRone HCl (BUSPAR) 30 MG tablet Take 30 mg by mouth 2 times daily       calcium polycarbophil (FIBERCON) 625 MG tablet Take 1 tablet (625 mg) by mouth daily       docusate sodium (COLACE) 100 MG capsule Take 1 capsule (100 mg) by mouth 2 times daily as needed for constipation       FLUoxetine (PROZAC) 20 MG capsule Take 3 capsules (60 mg) by mouth daily 30 capsule 0     furosemide (LASIX) 40 MG tablet Take 1 tablet (40 mg) by mouth daily 30 tablet 0     levETIRAcetam (KEPPRA) 250 MG tablet Take 5 tablets (1,250 mg) by mouth 2 times daily 60 tablet 0     linaclotide (LINZESS) 290 MCG capsule Take 1 capsule (290 mcg) by mouth every morning (before breakfast)       melatonin 3 MG tablet Take 2 tablets (6 mg) by mouth nightly as needed for sleep 30 tablet 0     ondansetron (ZOFRAN) 4 MG tablet Take 1 tablet (4 mg) by mouth every 8 hours as needed for nausea 30 tablet 3     pantoprazole (PROTONIX) 40 MG EC tablet Take 1 tablet (40 mg) by mouth 2 times daily (before meals) 30 tablet 0     polyethylene glycol (MIRALAX) 17 GM/Dose powder Take 17 g by mouth daily as needed for constipation 510 g 0     potassium chloride ER (KLOR-CON M) 20 MEQ CR tablet Take 2 tablets (40 mEq) by mouth daily       QUEtiapine (SEROQUEL) 25 MG tablet Take 1 tablet (25 mg) by mouth 2 times daily 30 tablet 0     sennosides (SENOKOT) 8.6 MG tablet Take 1 tablet by mouth 2 times daily as needed for constipation 30 tablet 0     temozolomide (TEMODAR) 100 MG capsule Take 1 capsule (100 mg) by mouth daily for 28 days Take ondansetron 30-60 min before temozolomide. Take at bedtime on an empty stomach. 28 capsule 0     temozolomide (TEMODAR) 100 MG capsule Take 1 capsule (100 mg) by mouth daily for 28 days Take ondansetron 30-60 min before temozolomide. Take at bedtime on an empty stomach. 28 capsule 0     DRUG ALLERGIES   Allergies   Allergen Reactions     Bacitracin Rash     Bactroban is effective; No difficulties     Erythromycin       intolerant.     Meperidine Hcl      intolerant only   Demerol     Chloraprep One Step Rash     IMMUNIZATIONS   Immunization History   Administered Date(s) Administered     COVID-19,PF,Moderna 03/18/2021     Flu 65+ Years 09/28/2020     HepB 01/01/1990     Influenza (IIV3) PF 01/01/2001     Influenza, Quad, High Dose, Pf, 65yr + 09/28/2020     Pneumococcal 23 valent 07/22/2020     Tetanus 06/13/2004     Zoster vaccine recombinant adjuvanted (SHINGRIX) 12/24/2019, 10/12/2020       ONCOLOGIC HISTORY  -2/2021 PRESENTATION: Progressive aphasia.   -2/19/2021 MR brain imaging with 3.3 x 2.8 x 2.8 cm enhancing mass in left frontal-parietal region. A second contrast enhancing lesion (0.5 x 1.0 x 1.0 cm) in right occipital lobe which is dural based and largely stable in size since 9/2011; likely representing a meningioma.  -2/23/2021 SURGERY: Gross total resection by Dr. Cummings  PATHOLOGY: Glioblastoma; IDH1-R132H wild-type/ IDH 1 and 2 wildtype, MGMT promoter methylated. Not BRAF mutated.   -3/23/2021 NEURO-ONC: Recommending chemoradiotherapy.   -3/2021 ADMISSION: SOB and chest pain; CT PA negative, but bilateral DVT noted on U/S. Started on Lovenox. Acute hypoxic respiratory failure in the setting of bilateral pneumonia; presumed PJP, concern for transfusion-related acute lung injury and right hemidiaphragm paralysis. Seizure. Right retroperitoneal hematoma. Ileus. Non-severe malnutrition on TPN with concern for refeeding syndrome. 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 Shock Liver.  -5/4 - 5/27/2021 RADS: 15 fractions.   -5/25/2021 NEURO-ONC/ DEVICE: Discussed the role of Optune; to be considered. Repeat MRI in 4 weeks with plans to start adjuvant temozolomide.   -6/22/2021 NEURO-ONC/ MRB/ CHEMO: Clinically improving. Imaging largely stable. Starting adjuvant temozolomide 150mg/m2 (250mg), cycle 1 (start date 6/24).   -7/20/2021 NEURO-ONC/ CHEMO: Clinically improving. Thrombocytopenia noted with adjuvant temozolomide dosing,  "platelets of 26K; will transition to metronomic dosing 50mg/m2 (100mg) daily to start once platelets are > 80K.    -8/17/2021 NEURO-ONC/ MRB/ CHEMO: Clinically improving. Saw Dr. Gamboa, who recommended starting anticoagulation; on Eliquis. Imaging with positive treatment response. Continue metronomic/ daily dosing at 50mg/m2 (100mg) through 12/2021.      SOCIAL HISTORY   History   Smoking Status     Never Smoker   Smokeless Tobacco     Never Used    Social History    Substance and Sexual Activity      Alcohol use: Yes        Comment: very rare     History   Drug Use No       PHYSICAL EXAMINATION  /80   Pulse 90   Temp 98.2  F (36.8  C) (Oral)   Resp 16   Ht 1.6 m (5' 2.99\")   Wt 70.5 kg (155 lb 6.4 oz)   SpO2 93%   BMI 27.54 kg/m    Wt Readings from Last 2 Encounters:   08/17/21 70.5 kg (155 lb 6.4 oz)   08/02/21 71.1 kg (156 lb 12.8 oz)      Ht Readings from Last 2 Encounters:   08/17/21 1.6 m (5' 2.99\")   08/02/21 1.6 m (5' 2.99\")     KPS: 60    -Generally well appearing. Slightly fatigued.   -Respiratory: No increased work of breathing.  -Skin: No facial rashes. Bruising on knees.   -Psychiatric: Normal mood and affect. Pleasant, talkative.  -Neurologic:   MENTAL STATUS:     Alert, oriented to self and situation.    Speech largely fluent. Occasionally word finding difficulties, able to name all NIHSS items and describe cookie picture without neglect.   Comprehension intact to all commands.     CRANIAL NERVES:     Pupils are equal, round.     Extraocular movements full, denies diplopia.     Visual fields full.     Facial sensation intact to light touch.   No facial droop today.   Hearing slightly decreased bilaterally.   Normal phonation.   MOTOR: Mild drift in right arm only (baseline on right; related to shoulder). Good  strength bilat.  SENSATION: Intact to light touch throughout.  COORDINATION: Intact finger-nose with eyes open and closed bilaterallyt.  GAIT: Sitting in wheelchair, uses " walker, which is not present and did not test.       MEDICAL RECORDS  Obtained and personally reviewed all available outside medical records in addition to reviewing any records available in our electronic system.     LABS  Personally reviewed all available lab results.     IMAGING  Personally reviewed MR brain imaging from today and compared to prior imaging. To my eye, there is significant decrease in size to near resolution of nodular area of contrast enhancement rostral to the resection cavity. Lessened degree of T2 FLAIR noted as well.     Imaging was shown to and results were reviewed with Olga and La Nena Bishop.       IMPRESSION  Clinic time for this high complexity encounter was spent discussing in detail the nature of her cancer, providing emotional support, answering questions pertaining to my recommendations, and devising the plan as outlined below.     Clinically, I am overall pleased with how well Olga is improving. I will place referrals to PT/ OT so that she can resume rehab therapies. She is off dexamethasone and with her absolute lymphocyte count > 0.5, there is no indication for the continuation of Bactrim. Energy is fair. She has not experienced any recent falls. Following an evaluation by Dr. Gamboa, she has restarted anticoagulation and this has been well tolerated with no signs/ symptoms of bleeding. RNCC will confirm medication list with care facility. If Olga is taking PRN Zofran, that is fine, but if Zofran is scheduled prior to temozolomide, can do a trial of no Zofran and only schedule if she experiences chemotherapy-associated nausea. Finally, given the complaint of some loose stools, will investigate her bowel regimen and consider stopping some medications.     Metronomic-dosed temozolomide has been much better tolerated with no significant side effects and labs have remained stable, most notably, there has been no chemotherapy-induced thrombocytopenia. RNCC to confirm  with Dr. Gamboa the frequency of lab monitoring he is requiring given newly started anticoagulation. From a chemotherapy standpoint, I need CBC every 2 weeks and CMP every 4 weeks.     Imaging from today showed a very positive treatment effect.     PROBLEM LIST  Glioblastoma  Aphasia  Apraxia    PLAN  -CANCER DIRECTED THERAPY-  -Continue daily (metronomic) dosing of temozolomide at 50mg/m2; 100mg/ day.  -Supportive medications; Zofran as needed and bowel regimen only as needed.   -Stopped Bactrim while not on steroids and ALC is > 0.5.     -Continue lab monitoring; CBC every 2 weeks and CMP monthly.   -Repeat imaging in 3 months.    -STEROIDS-  -Off dexamethasone.    -SEIZURE MANAGEMENT-  -Given history of seizures, antiepileptics are indicated.   -Continue Keppra.     -DVT-  -Following with Dr. Gamboa; currently on Eliquis.   -Requires lifelong anticoagulation given cancer diagnosis.   -Will need to consider removal of the IVC filter and frequency of monitoring labs; I will discuss with Dr. Gamboa.     -Quality of life/ MOOD/ FATIGUE-  -Denies any mood issues.  -Continue to monitor mood as untreated/ undertreated depression can worsen fatigue, dysorexia, and quality of life.    -REHAB NEEDS-  -Referral to PT for gait instability.   -Referral to OT for cognitive changes.   -Consideration for referral to Dr. Agudelo with cancer rehab.     Return to clinic in 1 month (virtually).     In the meantime, Olga and La Nena know to call with questions or concerns or to report new complaints and can be seen sooner if needed.    The patient was also seen and evaluated with PGY-4 neurology resident, Mony Dailey MD.    Stephanie Baker MD  Neuro-oncology

## 2021-08-16 NOTE — ORAL ONC MGMT
Oral Chemotherapy Monitoring Program.    Patient currently on metronomic temozolomide therapy. Pharmacist attempted to reach Yudy RN at nursing home to check in on how patient has been tolerating temozolomide under their care. Also calling to verify they received new shipment of temozolomide.    Unable to reach Yudy. Voicemail left for call back.    Brigid Mcintyre PharmD  August 16, 2021

## 2021-08-17 ENCOUNTER — HOSPITAL ENCOUNTER (OUTPATIENT)
Dept: MRI IMAGING | Facility: CLINIC | Age: 76
Discharge: HOME | End: 2021-08-17
Attending: PSYCHIATRY & NEUROLOGY | Admitting: PSYCHIATRY & NEUROLOGY
Payer: MEDICARE

## 2021-08-17 ENCOUNTER — ONCOLOGY VISIT (OUTPATIENT)
Dept: ONCOLOGY | Facility: CLINIC | Age: 76
End: 2021-08-17
Attending: PSYCHIATRY & NEUROLOGY
Payer: MEDICARE

## 2021-08-17 ENCOUNTER — LAB REQUISITION (OUTPATIENT)
Dept: LAB | Facility: CLINIC | Age: 76
End: 2021-08-17
Payer: MEDICARE

## 2021-08-17 VITALS
HEART RATE: 90 BPM | BODY MASS INDEX: 27.54 KG/M2 | RESPIRATION RATE: 16 BRPM | HEIGHT: 63 IN | DIASTOLIC BLOOD PRESSURE: 80 MMHG | SYSTOLIC BLOOD PRESSURE: 134 MMHG | WEIGHT: 155.4 LBS | OXYGEN SATURATION: 93 % | TEMPERATURE: 98.2 F

## 2021-08-17 DIAGNOSIS — I82.599: ICD-10-CM

## 2021-08-17 DIAGNOSIS — R26.81 GAIT INSTABILITY: ICD-10-CM

## 2021-08-17 DIAGNOSIS — C71.9 GLIOBLASTOMA (H): Primary | ICD-10-CM

## 2021-08-17 DIAGNOSIS — R41.89 COGNITIVE CHANGES: ICD-10-CM

## 2021-08-17 DIAGNOSIS — C71.9 GBM (GLIOBLASTOMA MULTIFORME) (H): ICD-10-CM

## 2021-08-17 PROCEDURE — A9585 GADOBUTROL INJECTION: HCPCS | Performed by: PSYCHIATRY & NEUROLOGY

## 2021-08-17 PROCEDURE — G0463 HOSPITAL OUTPT CLINIC VISIT: HCPCS

## 2021-08-17 PROCEDURE — 70553 MRI BRAIN STEM W/O & W/DYE: CPT | Mod: ME

## 2021-08-17 PROCEDURE — 255N000002 HC RX 255 OP 636: Performed by: PSYCHIATRY & NEUROLOGY

## 2021-08-17 PROCEDURE — 99215 OFFICE O/P EST HI 40 MIN: CPT | Performed by: PSYCHIATRY & NEUROLOGY

## 2021-08-17 RX ORDER — GADOBUTROL 604.72 MG/ML
10 INJECTION INTRAVENOUS ONCE
Status: COMPLETED | OUTPATIENT
Start: 2021-08-17 | End: 2021-08-17

## 2021-08-17 RX ADMIN — GADOBUTROL 10 ML: 604.72 INJECTION INTRAVENOUS at 14:36

## 2021-08-17 ASSESSMENT — PAIN SCALES - GENERAL: PAINLEVEL: NO PAIN (0)

## 2021-08-17 ASSESSMENT — MIFFLIN-ST. JEOR: SCORE: 1168.86

## 2021-08-17 NOTE — LETTER
8/17/2021         RE: Olga Bailey  [address] [facility] [address]        Dear Colleague,    Thank you for referring your patient, Olga Bailey, to the [facility] [place]. Please see a copy of my visit note below.    NEURO-ONCOLOGY VISIT  Aug 17, 2021    CHIEF COMPLAINT: Ms. Olga Bailey is a 75 year old right-handed woman with a left frontoparietal glioblastoma (IDH wild-type, MGMT promoter methylated), diagnosed following gross total resection on 2/23/2021. Initiation of upfront cancer-directed treatment was delayed due to a complicated hospitalization. Given a resolving infection and lowered functional status, radiation alone was initiated on 5/4/2021 and completed on 5/27/2021.     Olga started adjuvant therapy with 150mg/m2 dosing of temozolomide, however, cycle 1 was complicated by significant hematologic toxicity. Now she is on metronomic dosing and this is well tolerated. Imaging from 8/2021 demonstrates positive treatment effect.    Olga is presenting in follow-up accompanied by Rita (daughter) + Dario (son).    HISTORY OF PRESENT ILLNESS  -Olga recently moved to a new care center.  -She can walk on her own with a walker only used inconsistently, being more cautious in new facility. Right-sided neglect and right-sided weakness is stable to improving. No recent falls.  -Overall, tolerating daily dosed temozolomide well. Nausea is well controlled on supportive medications, intermittent mild constipation, but also some loose stools on current bowel regimen. Labs have remained stable.   -Energy is mediocre. Sleeping okay, getting used to new living situation.  -Mental status is stable remaining a little slowed and easily confused. Somewhat impaired attention and easily forgetful. Needing more assistance with complex decision making.  -Less frequent left V2 division facial pain; only occasionally occurring when going to bed. Sharp pain, 8/10 in  intensity. She has acetaminophen as needed.   -No headaches recently.  -Denies changes in sensation.  -Off dexamethasone.   -No recurrent seizures since her last visit with me, tolerating Keppra.  -Saw Dr. Gamboa; restarted anticoagulation.    REVIEW OF SYSTEMS  A comprehensive ROS negative except as in HPI.    MEDICATIONS   Current Outpatient Medications   Medication     acetaminophen (TYLENOL) 325 MG tablet     albuterol (PROAIR HFA/PROVENTIL HFA/VENTOLIN HFA) 108 (90 Base) MCG/ACT inhaler     albuterol (PROVENTIL) (2.5 MG/3ML) 0.083% neb solution     amLODIPine (NORVASC) 5 MG tablet     apixaban ANTICOAGULANT (ELIQUIS) 5 MG tablet     apixaban ANTICOAGULANT (ELIQUIS) 5 MG tablet     busPIRone HCl (BUSPAR) 30 MG tablet     calcium polycarbophil (FIBERCON) 625 MG tablet     docusate sodium (COLACE) 100 MG capsule     FLUoxetine (PROZAC) 20 MG capsule     furosemide (LASIX) 40 MG tablet     levETIRAcetam (KEPPRA) 250 MG tablet     linaclotide (LINZESS) 290 MCG capsule     melatonin 3 MG tablet     ondansetron (ZOFRAN) 4 MG tablet     pantoprazole (PROTONIX) 40 MG EC tablet     polyethylene glycol (MIRALAX) 17 GM/Dose powder     potassium chloride ER (KLOR-CON M) 20 MEQ CR tablet     QUEtiapine (SEROQUEL) 25 MG tablet     sennosides (SENOKOT) 8.6 MG tablet     temozolomide (TEMODAR) 100 MG capsule     temozolomide (TEMODAR) 100 MG capsule     No current facility-administered medications for this visit.     Current Outpatient Medications   Medication Sig Dispense Refill     acetaminophen (TYLENOL) 325 MG tablet Take 650 mg by mouth every 4 hours as needed for mild pain        albuterol (PROAIR HFA/PROVENTIL HFA/VENTOLIN HFA) 108 (90 Base) MCG/ACT inhaler Inhale 2 puffs into the lungs every 4 hours as needed for wheezing       albuterol (PROVENTIL) (2.5 MG/3ML) 0.083% neb solution Take 1 vial (2.5 mg) by nebulization every 4 hours as needed for wheezing or shortness of breath / dyspnea       amLODIPine (NORVASC) 5 MG  tablet Take 1 tablet (5 mg) by mouth daily       apixaban ANTICOAGULANT (ELIQUIS) 5 MG tablet Take 1 tablet (5 mg) by mouth 2 times daily 60 tablet      apixaban ANTICOAGULANT (ELIQUIS) 5 MG tablet Take 1 tablet (5 mg) by mouth 2 times daily 60 tablet      busPIRone HCl (BUSPAR) 30 MG tablet Take 30 mg by mouth 2 times daily       calcium polycarbophil (FIBERCON) 625 MG tablet Take 1 tablet (625 mg) by mouth daily       docusate sodium (COLACE) 100 MG capsule Take 1 capsule (100 mg) by mouth 2 times daily as needed for constipation       FLUoxetine (PROZAC) 20 MG capsule Take 3 capsules (60 mg) by mouth daily 30 capsule 0     furosemide (LASIX) 40 MG tablet Take 1 tablet (40 mg) by mouth daily 30 tablet 0     levETIRAcetam (KEPPRA) 250 MG tablet Take 5 tablets (1,250 mg) by mouth 2 times daily 60 tablet 0     linaclotide (LINZESS) 290 MCG capsule Take 1 capsule (290 mcg) by mouth every morning (before breakfast)       melatonin 3 MG tablet Take 2 tablets (6 mg) by mouth nightly as needed for sleep 30 tablet 0     ondansetron (ZOFRAN) 4 MG tablet Take 1 tablet (4 mg) by mouth every 8 hours as needed for nausea 30 tablet 3     pantoprazole (PROTONIX) 40 MG EC tablet Take 1 tablet (40 mg) by mouth 2 times daily (before meals) 30 tablet 0     polyethylene glycol (MIRALAX) 17 GM/Dose powder Take 17 g by mouth daily as needed for constipation 510 g 0     potassium chloride ER (KLOR-CON M) 20 MEQ CR tablet Take 2 tablets (40 mEq) by mouth daily       QUEtiapine (SEROQUEL) 25 MG tablet Take 1 tablet (25 mg) by mouth 2 times daily 30 tablet 0     sennosides (SENOKOT) 8.6 MG tablet Take 1 tablet by mouth 2 times daily as needed for constipation 30 tablet 0     temozolomide (TEMODAR) 100 MG capsule Take 1 capsule (100 mg) by mouth daily for 28 days Take ondansetron 30-60 min before temozolomide. Take at bedtime on an empty stomach. 28 capsule 0     temozolomide (TEMODAR) 100 MG capsule Take 1 capsule (100 mg) by mouth daily  for 28 days Take ondansetron 30-60 min before temozolomide. Take at bedtime on an empty stomach. 28 capsule 0     DRUG ALLERGIES   Allergies   Allergen Reactions     Bacitracin Rash     Bactroban is effective; No difficulties     Erythromycin      intolerant.     Meperidine Hcl      intolerant only   Demerol     Chloraprep One Step Rash     IMMUNIZATIONS   Immunization History   Administered Date(s) Administered     COVID-19,PF,Moderna 03/18/2021     Flu 65+ Years 09/28/2020     HepB 01/01/1990     Influenza (IIV3) PF 01/01/2001     Influenza, Quad, High Dose, Pf, 65yr + 09/28/2020     Pneumococcal 23 valent 07/22/2020     Tetanus 06/13/2004     Zoster vaccine recombinant adjuvanted (SHINGRIX) 12/24/2019, 10/12/2020       ONCOLOGIC HISTORY  -2/2021 PRESENTATION: Progressive aphasia.   -2/19/2021 MR brain imaging with 3.3 x 2.8 x 2.8 cm enhancing mass in left frontal-parietal region. A second contrast enhancing lesion (0.5 x 1.0 x 1.0 cm) in right occipital lobe which is dural based and largely stable in size since 9/2011; likely representing a meningioma.  -2/23/2021 SURGERY: Gross total resection by Dr. Cummings  PATHOLOGY: Glioblastoma; IDH1-R132H wild-type/ IDH 1 and 2 wildtype, MGMT promoter methylated. Not BRAF mutated.   -3/23/2021 NEURO-ONC: Recommending chemoradiotherapy.   -3/2021 ADMISSION: SOB and chest pain; CT PA negative, but bilateral DVT noted on U/S. Started on Lovenox. Acute hypoxic respiratory failure in the setting of bilateral pneumonia; presumed PJP, concern for transfusion-related acute lung injury and right hemidiaphragm paralysis. Seizure. Right retroperitoneal hematoma. Ileus. Non-severe malnutrition on TPN with concern for refeeding syndrome. 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 Shock Liver.  -5/4 - 5/27/2021 RADS: 15 fractions.   -5/25/2021 NEURO-ONC/ DEVICE: Discussed the role of Optune; to be considered. Repeat MRI in 4 weeks with plans to start adjuvant temozolomide.   -6/22/2021  "NEURO-ONC/ MRB/ CHEMO: Clinically improving. Imaging largely stable. Starting adjuvant temozolomide 150mg/m2 (250mg), cycle 1 (start date 6/24).   -7/20/2021 NEURO-ONC/ CHEMO: Clinically improving. Thrombocytopenia noted with adjuvant temozolomide dosing, platelets of 26K; will transition to metronomic dosing 50mg/m2 (100mg) daily to start once platelets are > 80K.    -8/17/2021 NEURO-ONC/ MRB/ CHEMO: Clinically improving. Saw Dr. Gamboa, who recommended starting anticoagulation; on Eliquis. Imaging with positive treatment response. Continue metronomic/ daily dosing at 50mg/m2 (100mg) through 12/2021.      SOCIAL HISTORY   History   Smoking Status     Never Smoker   Smokeless Tobacco     Never Used    Social History    Substance and Sexual Activity      Alcohol use: Yes        Comment: very rare     History   Drug Use No       PHYSICAL EXAMINATION  /80   Pulse 90   Temp 98.2  F (36.8  C) (Oral)   Resp 16   Ht 1.6 m (5' 2.99\")   Wt 70.5 kg (155 lb 6.4 oz)   SpO2 93%   BMI 27.54 kg/m    Wt Readings from Last 2 Encounters:   08/17/21 70.5 kg (155 lb 6.4 oz)   08/02/21 71.1 kg (156 lb 12.8 oz)      Ht Readings from Last 2 Encounters:   08/17/21 1.6 m (5' 2.99\")   08/02/21 1.6 m (5' 2.99\")     KPS: 60    -Generally well appearing. Slightly fatigued.   -Respiratory: No increased work of breathing.  -Skin: No facial rashes. Bruising on knees.   -Psychiatric: Normal mood and affect. Pleasant, talkative.  -Neurologic:   MENTAL STATUS:     Alert, oriented to self and situation.    Speech largely fluent. Occasionally word finding difficulties, able to name all NIHSS items and describe cookie picture without neglect.   Comprehension intact to all commands.     CRANIAL NERVES:     Pupils are equal, round.     Extraocular movements full, denies diplopia.     Visual fields full.     Facial sensation intact to light touch.   No facial droop today.   Hearing slightly decreased bilaterally.   Normal phonation.   MOTOR: " Mild drift in right arm only (baseline on right; related to shoulder). Good  strength bilat.  SENSATION: Intact to light touch throughout.  COORDINATION: Intact finger-nose with eyes open and closed bilaterallyt.  GAIT: Sitting in wheelchair, uses walker, which is not present and did not test.       MEDICAL RECORDS  Obtained and personally reviewed all available outside medical records in addition to reviewing any records available in our electronic system.     LABS  Personally reviewed all available lab results.     IMAGING  Personally reviewed MR brain imaging from today and compared to prior imaging. To my eye, there is significant decrease in size to near resolution of nodular area of contrast enhancement rostral to the resection cavity. Lessened degree of T2 FLAIR noted as well.     Imaging was shown to and results were reviewed with Olga and La Nena Bishop.       IMPRESSION  Clinic time for this high complexity encounter was spent discussing in detail the nature of her cancer, providing emotional support, answering questions pertaining to my recommendations, and devising the plan as outlined below.     Clinically, I am overall pleased with how well Olga is improving. I will place referrals to PT/ OT so that she can resume rehab therapies. She is off dexamethasone and with her absolute lymphocyte count > 0.5, there is no indication for the continuation of Bactrim. Energy is fair. She has not experienced any recent falls. Following an evaluation by Dr. Gamboa, she has restarted anticoagulation and this has been well tolerated with no signs/ symptoms of bleeding. RNCC will confirm medication list with care facility. If Olga is taking PRN Zofran, that is fine, but if Zofran is scheduled prior to temozolomide, can do a trial of no Zofran and only schedule if she experiences chemotherapy-associated nausea. Finally, given the complaint of some loose stools, will investigate her bowel regimen and  consider stopping some medications.     Metronomic-dosed temozolomide has been much better tolerated with no significant side effects and labs have remained stable, most notably, there has been no chemotherapy-induced thrombocytopenia. RNCC to confirm with Dr. Gamboa the frequency of lab monitoring he is requiring given newly started anticoagulation. From a chemotherapy standpoint, I need CBC every 2 weeks and CMP every 4 weeks.     Imaging from today showed a very positive treatment effect.     PROBLEM LIST  Glioblastoma  Aphasia  Apraxia    PLAN  -CANCER DIRECTED THERAPY-  -Continue daily (metronomic) dosing of temozolomide at 50mg/m2; 100mg/ day.  -Supportive medications; Zofran as needed and bowel regimen only as needed.   -Stopped Bactrim while not on steroids and ALC is > 0.5.     -Continue lab monitoring; CBC every 2 weeks and CMP monthly.   -Repeat imaging in 3 months.    -STEROIDS-  -Off dexamethasone.    -SEIZURE MANAGEMENT-  -Given history of seizures, antiepileptics are indicated.   -Continue Keppra.     -DVT-  -Following with Dr. Gamboa; currently on Eliquis.   -Requires lifelong anticoagulation given cancer diagnosis.   -Will need to consider removal of the IVC filter and frequency of monitoring labs; I will discuss with Dr. Gamboa.     -Quality of life/ MOOD/ FATIGUE-  -Denies any mood issues.  -Continue to monitor mood as untreated/ undertreated depression can worsen fatigue, dysorexia, and quality of life.    -REHAB NEEDS-  -Referral to PT for gait instability.   -Referral to OT for cognitive changes.   -Consideration for referral to Dr. Agudelo with cancer rehab.     Return to clinic in 1 month (virtually).     In the meantime, Olga and La Nena know to call with questions or concerns or to report new complaints and can be seen sooner if needed.    The patient was also seen and evaluated with PGY-4 neurology resident, Mony Dailey MD.    Stephanie Baker MD  Neuro-oncology      Oncology Rooming  "Note    August 17, 2021 4:03 PM   Olga Bailey is a 75 year old female who presents for:    Chief Complaint   Patient presents with     Oncology Clinic Visit     GBM (glioblastoma multiforme)     Initial Vitals: /80   Pulse 90   Temp 98.2  F (36.8  C) (Oral)   Resp 16   Ht 1.6 m (5' 2.99\")   Wt 70.5 kg (155 lb 6.4 oz)   SpO2 93%   BMI 27.54 kg/m   Estimated body mass index is 27.54 kg/m  as calculated from the following:    Height as of this encounter: 1.6 m (5' 2.99\").    Weight as of this encounter: 70.5 kg (155 lb 6.4 oz). Body surface area is 1.77 meters squared.  No Pain (0) Comment: Data Unavailable   No LMP recorded. Patient has had a hysterectomy.  Allergies reviewed: Yes  Medications reviewed: Yes    Medications: Medication refills not needed today.  Pharmacy name entered into [facility]:    [id number] - [place], [place] - [address] [address] [facility] [address]  [facility] - [place], [place] - [address] [facility] [id number] - [place], [place] - [address] [facility]    Clinical concerns: None       Olga Mishra MA                      Again, thank you for allowing me to participate in the care of your patient.        Sincerely,        Stephanie Baker MD    "

## 2021-08-17 NOTE — PROGRESS NOTES
"Oncology Rooming Note    August 17, 2021 4:03 PM   Olga Bailey is a 75 year old female who presents for:    Chief Complaint   Patient presents with     Oncology Clinic Visit     GBM (glioblastoma multiforme)     Initial Vitals: /80   Pulse 90   Temp 98.2  F (36.8  C) (Oral)   Resp 16   Ht 1.6 m (5' 2.99\")   Wt 70.5 kg (155 lb 6.4 oz)   SpO2 93%   BMI 27.54 kg/m   Estimated body mass index is 27.54 kg/m  as calculated from the following:    Height as of this encounter: 1.6 m (5' 2.99\").    Weight as of this encounter: 70.5 kg (155 lb 6.4 oz). Body surface area is 1.77 meters squared.  No Pain (0) Comment: Data Unavailable   No LMP recorded. Patient has had a hysterectomy.  Allergies reviewed: Yes  Medications reviewed: Yes    Medications: Medication refills not needed today.  Pharmacy name entered into [facility]:    [id number] - [place], [place] - [address] AT [facility] [address]  [facility] - [place], [place] - [address]  [facility] [id number] - [place], [place] - [address] [place]    Clinical concerns: None       Olga Mishra MA                  "

## 2021-08-18 ENCOUNTER — PATIENT OUTREACH (OUTPATIENT)
Dept: ONCOLOGY | Facility: CLINIC | Age: 76
End: 2021-08-18

## 2021-08-18 ENCOUNTER — DOCUMENTATION ONLY (OUTPATIENT)
Dept: PHARMACY | Facility: CLINIC | Age: 76
End: 2021-08-18

## 2021-08-18 ENCOUNTER — LAB REQUISITION (OUTPATIENT)
Dept: LAB | Facility: CLINIC | Age: 76
End: 2021-08-18
Payer: MEDICARE

## 2021-08-18 DIAGNOSIS — C71.9 MALIGNANT NEOPLASM OF BRAIN, UNSPECIFIED (H): ICD-10-CM

## 2021-08-18 LAB
ANION GAP SERPL CALCULATED.3IONS-SCNC: 11 MMOL/L (ref 5–18)
BASOPHILS # BLD AUTO: 0 10E3/UL (ref 0–0.2)
BASOPHILS NFR BLD AUTO: 0 %
BUN SERPL-MCNC: 15 MG/DL (ref 8–28)
CALCIUM SERPL-MCNC: 10 MG/DL (ref 8.5–10.5)
CHLORIDE BLD-SCNC: 105 MMOL/L (ref 98–107)
CO2 SERPL-SCNC: 27 MMOL/L (ref 22–31)
CREAT SERPL-MCNC: 0.77 MG/DL (ref 0.6–1.1)
EOSINOPHIL # BLD AUTO: 0.4 10E3/UL (ref 0–0.7)
EOSINOPHIL NFR BLD AUTO: 6 %
ERYTHROCYTE [DISTWIDTH] IN BLOOD BY AUTOMATED COUNT: 15 % (ref 10–15)
GFR SERPL CREATININE-BSD FRML MDRD: 76 ML/MIN/1.73M2
GLUCOSE BLD-MCNC: 86 MG/DL (ref 70–125)
HCT VFR BLD AUTO: 33.4 % (ref 35–47)
HGB BLD-MCNC: 10.5 G/DL (ref 11.7–15.7)
IMM GRANULOCYTES # BLD: 0 10E3/UL
IMM GRANULOCYTES NFR BLD: 1 %
LYMPHOCYTES # BLD AUTO: 0.5 10E3/UL (ref 0.8–5.3)
LYMPHOCYTES NFR BLD AUTO: 8 %
MCH RBC QN AUTO: 31 PG (ref 26.5–33)
MCHC RBC AUTO-ENTMCNC: 31.4 G/DL (ref 31.5–36.5)
MCV RBC AUTO: 99 FL (ref 78–100)
MONOCYTES # BLD AUTO: 0.8 10E3/UL (ref 0–1.3)
MONOCYTES NFR BLD AUTO: 11 %
NEUTROPHILS # BLD AUTO: 5 10E3/UL (ref 1.6–8.3)
NEUTROPHILS NFR BLD AUTO: 74 %
NRBC # BLD AUTO: 0 10E3/UL
NRBC BLD AUTO-RTO: 0 /100
PLATELET # BLD AUTO: 309 10E3/UL (ref 150–450)
POTASSIUM BLD-SCNC: 3.9 MMOL/L (ref 3.5–5)
RBC # BLD AUTO: 3.39 10E6/UL (ref 3.8–5.2)
SODIUM SERPL-SCNC: 143 MMOL/L (ref 136–145)
WBC # BLD AUTO: 6.8 10E3/UL (ref 4–11)

## 2021-08-18 PROCEDURE — 80048 BASIC METABOLIC PNL TOTAL CA: CPT | Mod: ORL | Performed by: INTERNAL MEDICINE

## 2021-08-18 PROCEDURE — P9604 ONE-WAY ALLOW PRORATED TRIP: HCPCS | Mod: ORL | Performed by: INTERNAL MEDICINE

## 2021-08-18 PROCEDURE — 85025 COMPLETE CBC W/AUTO DIFF WBC: CPT | Mod: ORL | Performed by: INTERNAL MEDICINE

## 2021-08-18 PROCEDURE — 36415 COLL VENOUS BLD VENIPUNCTURE: CPT | Mod: ORL | Performed by: INTERNAL MEDICINE

## 2021-08-18 NOTE — PROGRESS NOTES
"Per Dr. Baker,   \"Placed orders for PT/ OT; need to be sent to [facility] I think. (??)     Can you confirm meds with NH;   -Is she taking scheduled Zofran prior to TMZ or is it PRN?      -If PRN, that is good. If scheduled, can do a trial of no Zofran and will only need to schedule if chemotherapy is associated with nausea.   -What is her bowel regimen; La Nena says that stools have been loose and thinks there are too many meds onboard.     Finally, can you please check with BK his desired lab draw frequency given that she is restarted on anticoagulation. Currently, she is having labs TWICE a week per La Nena (??). At what duration is he wanting this draws (4 weeks, longer?). From my standpoint, I need a CBC q 2 weeks and a CMP q 4 weeks.     Thanks!   Stephanie Baker MD   Neuro-oncology   8/17/2021\"    -Per Dr. Gamboa's note: \"CBC twice a week for next 3 weeks.  After that, once a week for another 4 weeks.\"   -Per pharmacy team, pharmacy reports Eliquis (prescribed 8/2) has not been filled yet by skilled nursing. Will confirm with nursing if they have been administering as prescribed.    Writer attempted to contact nurse at. [facility] for ELEANOR Jimenes requesting call back.    Louise Jerry, BSN, RN, PHN, OCN  Oncology Care Coordinator  [facility]      "

## 2021-08-18 NOTE — PROGRESS NOTES
Oral Chemotherapy Monitoring Program  Lab Follow Up    Reviewed lab results from 08/16/21.    ORAL CHEMOTHERAPY 3/26/2021 6/22/2021 8/2/2021 8/6/2021 8/18/2021   Assessment Type New Teach New Teach Lab Monitoring New Teach Lab Monitoring   Diagnosis Code Brain Cancer - Glioblastoma Brain Cancer - Glioblastoma Brain Cancer - Glioblastoma Brain Cancer - Glioblastoma Brain Cancer - Glioblastoma   Providers Samuel Baker   Clinic Name/Location [facility]   Drug Name Temodar (temozolomide) Temodar (temozolomide) Temodar (temozolomide) Temodar (temozolomide) Temodar (temozolomide)   Dose 140 mg 250 mg 100 mg 100 mg 100 mg   Current Schedule Daily Daily Daily Daily Daily   Cycle Details Continuous for 42 days during XRT 5 days on, 23 days off Continuous Continuous Continuous   Start Date of Last Cycle - - 7/23/2021 7/23/2021 -   Planned next cycle start date - 6/24/2021 - - -   Adverse Effects - - - - No AE identified during assessment   Any new drug interactions? - No - - -   Is the dose as ordered appropriate for the patient? - Yes - - -       Labs:  _  Result Component Current Result Ref Range   Sodium 143 (8/16/2021) 136 - 145 mmol/L     _  Result Component Current Result Ref Range   Potassium 4.1 (8/16/2021) 3.5 - 5.0 mmol/L     _  Result Component Current Result Ref Range   Calcium 10.0 (8/16/2021) 8.5 - 10.5 mg/dL     No results found for Mag within last 30 days.     No results found for Phos within last 30 days.     _  Result Component Current Result Ref Range   Albumin 3.5 (8/2/2021) 3.4 - 5.0 g/dL     _  Result Component Current Result Ref Range   Urea Nitrogen 14 (8/16/2021) 8 - 28 mg/dL     _  Result Component Current Result Ref Range   Creatinine 0.76 (8/16/2021) 0.60 - 1.10 mg/dL     _  Result Component Current Result Ref Range   AST 18 (8/2/2021) 0 - 45 U/L     _  Result Component Current Result Ref Range   ALT 20 (8/2/2021) 0 - 50 U/L      _  Result Component Current Result Ref Range   Bilirubin Total 0.6 (8/2/2021) 0.2 - 1.3 mg/dL     _  Result Component Current Result Ref Range   WBC Count 7.0 (8/16/2021) 4.0 - 11.0 10e3/uL     _  Result Component Current Result Ref Range   Hemoglobin 10.5 (L) (8/16/2021) 11.7 - 15.7 g/dL     _  Result Component Current Result Ref Range   Platelet Count 334 (8/16/2021) 150 - 450 10e3/uL     _  Result Component Current Result Ref Range   Absolute Neutrophils 2.2 (8/2/2021) 1.6 - 8.3 10e3/uL       Assessment & Plan:  No concerning abnormalities. Ok to proceed with metronomic temozolomide.    Questions answered to patient's satisfaction.    Follow-Up:  2 weeks with labs.    Rachel Moniz, Pharmacist Intern  August 18, 2021    Brigid Mcintyre PharmD  August 18, 2021

## 2021-08-19 ENCOUNTER — LAB REQUISITION (OUTPATIENT)
Dept: LAB | Facility: CLINIC | Age: 76
End: 2021-08-19
Payer: COMMERCIAL

## 2021-08-19 DIAGNOSIS — I82.599: ICD-10-CM

## 2021-08-19 NOTE — PROGRESS NOTES
8/19/21 0929: Attempted to reach Yudy, director of nursing at [facility]. Left another  requesting call back.     Contacted nurse line at [phone] and reached ELEANOR Lentz who was unable to answer questions, but took message and will review with ALON Jimenes and request she return call.

## 2021-08-19 NOTE — PROGRESS NOTES
Received return call from Yudy, Director of Nursing. Clarified the followin. PT/OT orders. Yudy reports patient is already receiving PT/OT through [facility].   2. Yudy reports pt has been receiving scheduled Zofran prior to TMZ dosing. Will do a trial of no Zofran and will only need to schedule if chemotherapy is associated with nausea. Updated script faxed for PRN Zofran.  3. Yudy reports pt was having loose stools, but they have been holding her linaclotide at La Nena's request. No issues with loose stools since holding linaclotide. Yudy will follow-up with PCP on this since PCP prescribes linalotide. Confirmed Miralax/Senna are PRN, but should not be administered if having loose stools. Yudy reports understanding.   3. Yudy confirms pt has been receiving Eliquis as ordered. Was sent with a supply from previous [facility].   4. Clarified lab orders. Will change CBC to weekly (per Dr. Gamboa's recommendation) and draw monthly CMP. Updated lab orders faxed to Yudy.     No other questions or concerns at this time. Will route as update to Dr. Baker.     Louise Jerry, BSN, RN, PHN, OCN  Oncology Care Coordinator  [facility]

## 2021-08-20 ENCOUNTER — TELEPHONE (OUTPATIENT)
Dept: ONCOLOGY | Facility: CLINIC | Age: 76
End: 2021-08-20

## 2021-08-20 ENCOUNTER — LAB REQUISITION (OUTPATIENT)
Dept: LAB | Facility: CLINIC | Age: 76
End: 2021-08-20
Payer: MEDICARE

## 2021-08-20 DIAGNOSIS — I82.599: ICD-10-CM

## 2021-08-20 NOTE — TELEPHONE ENCOUNTER
Returned call to ELEANOR Grier. Marvel reports they were having an issue with Eliquis cost, but he spoke with La Nena who approved cost and medication has been refilled. Confirmed plan for weekly CBC on Wednesdays and monthly CMP. No other questions or concerns at this time.     Louise Jerry, JIANN, RN, PHN, OCN  Oncology Care Coordinator  [facility]

## 2021-08-20 NOTE — TELEPHONE ENCOUNTER
Marvel Hurst from [facility] called clinic requesting a call back at at [phone]. Message will be forwarded to RNCC.

## 2021-08-23 ENCOUNTER — PATIENT OUTREACH (OUTPATIENT)
Dept: ONCOLOGY | Facility: CLINIC | Age: 76
End: 2021-08-23

## 2021-08-23 NOTE — PROGRESS NOTES
PT for 7 visits over the next month and speech to evaluate and treat.    Writer provided the verbal orders. Paper orders to be faxed and signed.     Elizabeth Ricci RN

## 2021-08-24 ENCOUNTER — LAB REQUISITION (OUTPATIENT)
Dept: LAB | Facility: CLINIC | Age: 76
End: 2021-08-24
Payer: MEDICARE

## 2021-08-24 DIAGNOSIS — D61.810 ANTINEOPLASTIC CHEMOTHERAPY INDUCED PANCYTOPENIA (CODE) (H): ICD-10-CM

## 2021-08-25 LAB
BASOPHILS # BLD AUTO: 0.1 10E3/UL (ref 0–0.2)
BASOPHILS NFR BLD AUTO: 1 %
EOSINOPHIL # BLD AUTO: 0.6 10E3/UL (ref 0–0.7)
EOSINOPHIL NFR BLD AUTO: 8 %
ERYTHROCYTE [DISTWIDTH] IN BLOOD BY AUTOMATED COUNT: 15 % (ref 10–15)
HCT VFR BLD AUTO: 32.3 % (ref 35–47)
HGB BLD-MCNC: 10.4 G/DL (ref 11.7–15.7)
IMM GRANULOCYTES # BLD: 0 10E3/UL
IMM GRANULOCYTES NFR BLD: 0 %
LYMPHOCYTES # BLD AUTO: 0.4 10E3/UL (ref 0.8–5.3)
LYMPHOCYTES NFR BLD AUTO: 6 %
MCH RBC QN AUTO: 31 PG (ref 26.5–33)
MCHC RBC AUTO-ENTMCNC: 32.2 G/DL (ref 31.5–36.5)
MCV RBC AUTO: 96 FL (ref 78–100)
MONOCYTES # BLD AUTO: 0.8 10E3/UL (ref 0–1.3)
MONOCYTES NFR BLD AUTO: 11 %
NEUTROPHILS # BLD AUTO: 5 10E3/UL (ref 1.6–8.3)
NEUTROPHILS NFR BLD AUTO: 74 %
NRBC # BLD AUTO: 0 10E3/UL
NRBC BLD AUTO-RTO: 0 /100
PLATELET # BLD AUTO: 166 10E3/UL (ref 150–450)
RBC # BLD AUTO: 3.35 10E6/UL (ref 3.8–5.2)
WBC # BLD AUTO: 6.9 10E3/UL (ref 4–11)

## 2021-08-25 PROCEDURE — P9603 ONE-WAY ALLOW PRORATED MILES: HCPCS | Mod: ORL | Performed by: PSYCHIATRY & NEUROLOGY

## 2021-08-25 PROCEDURE — 36415 COLL VENOUS BLD VENIPUNCTURE: CPT | Mod: ORL | Performed by: PSYCHIATRY & NEUROLOGY

## 2021-08-25 PROCEDURE — 85025 COMPLETE CBC W/AUTO DIFF WBC: CPT | Mod: ORL | Performed by: PSYCHIATRY & NEUROLOGY

## 2021-08-26 ENCOUNTER — DOCUMENTATION ONLY (OUTPATIENT)
Dept: PHARMACY | Facility: CLINIC | Age: 76
End: 2021-08-26

## 2021-08-26 NOTE — PROGRESS NOTES
Oral Chemotherapy Monitoring Program.    Patient currently on metronomic temozolomide therapy.    Reviewed labs from 8/25/21. Downward trend in platelets. Ok to proceed with therapy but repeat labs in 1 week per Dr. Baker.       Will follow up in 1 week with repeat labs.     Brigid Mcintyre PharmD  August 26, 2021

## 2021-08-27 ENCOUNTER — LAB REQUISITION (OUTPATIENT)
Dept: LAB | Facility: CLINIC | Age: 76
End: 2021-08-27
Payer: MEDICARE

## 2021-08-27 DIAGNOSIS — I82.599: ICD-10-CM

## 2021-09-01 ENCOUNTER — LAB REQUISITION (OUTPATIENT)
Dept: LAB | Facility: CLINIC | Age: 76
End: 2021-09-01
Payer: MEDICARE

## 2021-09-01 ENCOUNTER — PATIENT OUTREACH (OUTPATIENT)
Dept: ONCOLOGY | Facility: CLINIC | Age: 76
End: 2021-09-01

## 2021-09-01 DIAGNOSIS — D61.810 ANTINEOPLASTIC CHEMOTHERAPY INDUCED PANCYTOPENIA (CODE) (H): ICD-10-CM

## 2021-09-01 DIAGNOSIS — R11.2 NAUSEA WITH VOMITING, UNSPECIFIED: ICD-10-CM

## 2021-09-01 DIAGNOSIS — C71.9 GLIOBLASTOMA (H): Primary | ICD-10-CM

## 2021-09-01 NOTE — PROGRESS NOTES
Discussed Hematology plan and follow-up needs with Dr. Gamboa. Per Dr. Gamboa, ok to push out follow-up to coordinate with next in-person visit with Dr. Baker (~10/2021). Also ok to change CBC to every 2 weeks.     SNF updated on plan for labs. Updated lab orders faxed to [facility] Fax [phone]    Louise Jerry, JIANN, RN, PHN, OCN  Oncology Care Coordinator  [facility]

## 2021-09-02 LAB
ALBUMIN SERPL-MCNC: 3.4 G/DL (ref 3.5–5)
ALP SERPL-CCNC: 70 U/L (ref 45–120)
ALT SERPL W P-5'-P-CCNC: 11 U/L (ref 0–45)
ANION GAP SERPL CALCULATED.3IONS-SCNC: 8 MMOL/L (ref 5–18)
AST SERPL W P-5'-P-CCNC: 15 U/L (ref 0–40)
BASOPHILS # BLD AUTO: 0 10E3/UL (ref 0–0.2)
BASOPHILS NFR BLD AUTO: 0 %
BILIRUB SERPL-MCNC: 0.3 MG/DL (ref 0–1)
BUN SERPL-MCNC: 14 MG/DL (ref 8–28)
CALCIUM SERPL-MCNC: 9.4 MG/DL (ref 8.5–10.5)
CHLORIDE BLD-SCNC: 106 MMOL/L (ref 98–107)
CO2 SERPL-SCNC: 28 MMOL/L (ref 22–31)
CREAT SERPL-MCNC: 0.91 MG/DL (ref 0.6–1.1)
EOSINOPHIL # BLD AUTO: 0.5 10E3/UL (ref 0–0.7)
EOSINOPHIL NFR BLD AUTO: 8 %
ERYTHROCYTE [DISTWIDTH] IN BLOOD BY AUTOMATED COUNT: 15.5 % (ref 10–15)
GFR SERPL CREATININE-BSD FRML MDRD: 61 ML/MIN/1.73M2
GLUCOSE BLD-MCNC: 83 MG/DL (ref 70–125)
HCT VFR BLD AUTO: 32.8 % (ref 35–47)
HGB BLD-MCNC: 10.6 G/DL (ref 11.7–15.7)
IMM GRANULOCYTES # BLD: 0 10E3/UL
IMM GRANULOCYTES NFR BLD: 0 %
LYMPHOCYTES # BLD AUTO: 0.3 10E3/UL (ref 0.8–5.3)
LYMPHOCYTES NFR BLD AUTO: 6 %
MCH RBC QN AUTO: 31.3 PG (ref 26.5–33)
MCHC RBC AUTO-ENTMCNC: 32.3 G/DL (ref 31.5–36.5)
MCV RBC AUTO: 97 FL (ref 78–100)
MONOCYTES # BLD AUTO: 0.6 10E3/UL (ref 0–1.3)
MONOCYTES NFR BLD AUTO: 11 %
NEUTROPHILS # BLD AUTO: 4.2 10E3/UL (ref 1.6–8.3)
NEUTROPHILS NFR BLD AUTO: 75 %
NRBC # BLD AUTO: 0 10E3/UL
NRBC BLD AUTO-RTO: 0 /100
PLATELET # BLD AUTO: 214 10E3/UL (ref 150–450)
POTASSIUM BLD-SCNC: 4.1 MMOL/L (ref 3.5–5)
PROT SERPL-MCNC: 5.8 G/DL (ref 6–8)
RBC # BLD AUTO: 3.39 10E6/UL (ref 3.8–5.2)
SODIUM SERPL-SCNC: 142 MMOL/L (ref 136–145)
WBC # BLD AUTO: 5.7 10E3/UL (ref 4–11)

## 2021-09-02 PROCEDURE — 80053 COMPREHEN METABOLIC PANEL: CPT | Mod: ORL | Performed by: PSYCHIATRY & NEUROLOGY

## 2021-09-02 PROCEDURE — 36415 COLL VENOUS BLD VENIPUNCTURE: CPT | Mod: ORL | Performed by: PSYCHIATRY & NEUROLOGY

## 2021-09-02 PROCEDURE — P9603 ONE-WAY ALLOW PRORATED MILES: HCPCS | Mod: ORL | Performed by: PSYCHIATRY & NEUROLOGY

## 2021-09-02 PROCEDURE — 85025 COMPLETE CBC W/AUTO DIFF WBC: CPT | Mod: ORL | Performed by: PSYCHIATRY & NEUROLOGY

## 2021-09-03 ENCOUNTER — DOCUMENTATION ONLY (OUTPATIENT)
Dept: PHARMACY | Facility: CLINIC | Age: 76
End: 2021-09-03

## 2021-09-03 NOTE — PROGRESS NOTES
Oral Chemotherapy Monitoring Program.    Patient currently on metronomic temozolomide therapy.    Reviewed labs from 9/2/21. No concerning abnormalities.  Ok to proceed with metronomic temozolomide. Repeat labs and 2 weeks. Follow up with Dr. Baker on 9/14 as planned.     Questions answered to patient's satisfaction.    Will follow up on 9/14 with Dr. Baker appt.     Brigid Mcintyre PharmD  September 3, 2021

## 2021-09-04 ENCOUNTER — HEALTH MAINTENANCE LETTER (OUTPATIENT)
Age: 76
End: 2021-09-04

## 2021-09-08 DIAGNOSIS — C71.9 GLIOBLASTOMA (H): Primary | ICD-10-CM

## 2021-09-08 RX ORDER — TEMOZOLOMIDE 100 MG/1
50 CAPSULE ORAL DAILY
Qty: 28 CAPSULE | Refills: 0 | Status: SHIPPED | OUTPATIENT
Start: 2021-09-08 | End: 2021-10-10

## 2021-09-10 ENCOUNTER — ANCILLARY PROCEDURE (OUTPATIENT)
Dept: CT IMAGING | Facility: CLINIC | Age: 76
End: 2021-09-10
Payer: MEDICARE

## 2021-09-10 ENCOUNTER — OFFICE VISIT (OUTPATIENT)
Dept: PULMONOLOGY | Facility: CLINIC | Age: 76
End: 2021-09-10
Payer: MEDICARE

## 2021-09-10 VITALS
WEIGHT: 152 LBS | HEIGHT: 63 IN | OXYGEN SATURATION: 93 % | HEART RATE: 90 BPM | SYSTOLIC BLOOD PRESSURE: 122 MMHG | RESPIRATION RATE: 17 BRPM | DIASTOLIC BLOOD PRESSURE: 77 MMHG | BODY MASS INDEX: 26.93 KG/M2

## 2021-09-10 DIAGNOSIS — J96.01 ACUTE RESPIRATORY FAILURE WITH HYPOXIA (H): ICD-10-CM

## 2021-09-10 DIAGNOSIS — B59 PNEUMONIA OF BOTH LUNGS DUE TO PNEUMOCYSTIS JIROVECII, UNSPECIFIED PART OF LUNG (H): ICD-10-CM

## 2021-09-10 DIAGNOSIS — J84.9 ILD (INTERSTITIAL LUNG DISEASE) (H): Primary | ICD-10-CM

## 2021-09-10 DIAGNOSIS — J96.01 ACUTE RESPIRATORY FAILURE WITH HYPOXIA (H): Primary | ICD-10-CM

## 2021-09-10 LAB
6 MIN WALK (FT): 610 FT
6 MIN WALK (M): 186 M

## 2021-09-10 PROCEDURE — 94726 PLETHYSMOGRAPHY LUNG VOLUMES: CPT | Performed by: INTERNAL MEDICINE

## 2021-09-10 PROCEDURE — 71250 CT THORAX DX C-: CPT | Mod: GC | Performed by: RADIOLOGY

## 2021-09-10 PROCEDURE — 94618 PULMONARY STRESS TESTING: CPT | Performed by: INTERNAL MEDICINE

## 2021-09-10 PROCEDURE — 94375 RESPIRATORY FLOW VOLUME LOOP: CPT | Performed by: INTERNAL MEDICINE

## 2021-09-10 PROCEDURE — 99207 PR NO CHARGE LOS: CPT

## 2021-09-10 PROCEDURE — 94729 DIFFUSING CAPACITY: CPT | Performed by: INTERNAL MEDICINE

## 2021-09-10 PROCEDURE — 99214 OFFICE O/P EST MOD 30 MIN: CPT | Mod: 25 | Performed by: INTERNAL MEDICINE

## 2021-09-10 ASSESSMENT — PAIN SCALES - GENERAL: PAINLEVEL: NO PAIN (0)

## 2021-09-10 ASSESSMENT — MIFFLIN-ST. JEOR: SCORE: 1148.6
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
## 2021-09-10 NOTE — NURSING NOTE
Chief Complaint   Patient presents with     RECHECK     Return 2 month f/u    Medications reviewed and vital signs taken.   Derrick Bauer, CMA
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
## 2021-09-13 LAB
DLCOCOR-%PRED-PRE: 61 %
DLCOCOR-PRE: 11.35 ML/MIN/MMHG
DLCOUNC-%PRED-PRE: 55 %
DLCOUNC-PRE: 10.24 ML/MIN/MMHG
DLCOUNC-PRED: 18.35 ML/MIN/MMHG
ERV-%PRED-PRE: 21 %
ERV-PRE: 0.21 L
ERV-PRED: 0.98 L
EXPTIME-PRE: 6.64 SEC
FEF2575-%PRED-PRE: 120 %
FEF2575-PRE: 1.97 L/SEC
FEF2575-PRED: 1.63 L/SEC
FEFMAX-%PRED-PRE: 90 %
FEFMAX-PRE: 4.52 L/SEC
FEFMAX-PRED: 4.99 L/SEC
FEV1-%PRED-PRE: 64 %
FEV1-PRE: 1.27 L
FEV1FEV6-PRE: 91 %
FEV1FEV6-PRED: 78 %
FEV1FVC-PRE: 91 %
FEV1FVC-PRED: 78 %
FEV1SVC-PRE: 89 %
FEV1SVC-PRED: 70 %
FIFMAX-PRE: 2.92 L/SEC
FRCPLETH-%PRED-PRE: 69 %
FRCPLETH-PRE: 1.85 L
FRCPLETH-PRED: 2.65 L
FVC-%PRED-PRE: 54 %
FVC-PRE: 1.39 L
FVC-PRED: 2.56 L
IC-%PRED-PRE: 66 %
IC-PRE: 1.21 L
IC-PRED: 1.81 L
RVPLETH-%PRED-PRE: 77 %
RVPLETH-PRE: 1.64 L
RVPLETH-PRED: 2.11 L
TLCPLETH-%PRED-PRE: 64 %
TLCPLETH-PRE: 3.06 L
TLCPLETH-PRED: 4.75 L
VA-%PRED-PRE: 54 %
VA-PRE: 2.44 L
VC-%PRED-PRE: 50 %
VC-PRE: 1.42 L
VC-PRED: 2.8 L

## 2021-09-13 NOTE — PROGRESS NOTES
"NEURO-ONCOLOGY VISIT  Sep 14, 2021    CHIEF COMPLAINT: Ms. Olga Bailey is a 76 year old right-handed woman with a left frontoparietal glioblastoma (IDH wild-type, MGMT promoter methylated), diagnosed following gross total resection on 2/23/2021. Initiation of upfront cancer-directed treatment was delayed due to a complicated hospitalization. Given a resolving infection and lowered functional status, radiation alone was initiated on 5/4/2021 and completed on 5/27/2021.     Olga started adjuvant therapy with 150mg/m2 dosing of temozolomide, however, cycle 1 was complicated by significant hematologic toxicity. Now she is on metronomic dosing and this is well tolerated. Imaging from 8/2021 demonstrates positive treatment effect.    Olga is presenting in follow-up accompanied by Vish (son-in-law).    HISTORY OF PRESENT ILLNESS  -Olga is doing fairly well today.  -She is noting fatigue; \"I could sleep all day.\" Sleeping well at night, however, has not resumed use of her CPAP. Mood is good.   -Overall, tolerating daily-dosed temozolomide well. Mild intermittent nausea, using Zofran as needed. Denies constipation. Labs have remained stable.   -Eating and drinking \"enough\", low appetite.   -She can walk on her own with a walker. Wanting to be more active; currently spending much time sitting in wheelchair/ in bed. Right-sided neglect and right-sided weakness is \"definitely\" improving. No recent falls. Notes leg swelling.   -Mental status is also better, less forgetful. Needing assistance with complex decision making.  -No longer experiencing left V2 division facial pain. No headaches recently.  -Denies changes in sensation.  -Off dexamethasone.   -No recurrent seizures since her last visit with me, tolerating Keppra.  -On anticoagulation with no significant signs/ symptoms of bleeding.    REVIEW OF SYSTEMS  A comprehensive ROS negative except as in HPI.    MEDICATIONS   Current Outpatient Medications "   Medication     acetaminophen (TYLENOL) 325 MG tablet     albuterol (PROAIR HFA/PROVENTIL HFA/VENTOLIN HFA) 108 (90 Base) MCG/ACT inhaler     albuterol (PROVENTIL) (2.5 MG/3ML) 0.083% neb solution     amLODIPine (NORVASC) 5 MG tablet     apixaban ANTICOAGULANT (ELIQUIS) 5 MG tablet     busPIRone HCl (BUSPAR) 30 MG tablet     calcium polycarbophil (FIBERCON) 625 MG tablet     docusate sodium (COLACE) 100 MG capsule     FLUoxetine (PROZAC) 20 MG capsule     furosemide (LASIX) 40 MG tablet     levETIRAcetam (KEPPRA) 250 MG tablet     melatonin 3 MG tablet     ondansetron (ZOFRAN) 4 MG tablet     pantoprazole (PROTONIX) 40 MG EC tablet     polyethylene glycol (MIRALAX) 17 GM/Dose powder     potassium chloride ER (KLOR-CON M) 20 MEQ CR tablet     sennosides (SENOKOT) 8.6 MG tablet     temozolomide (TEMODAR) 100 MG capsule     apixaban ANTICOAGULANT (ELIQUIS) 5 MG tablet     temozolomide (TEMODAR) 100 MG capsule     temozolomide (TEMODAR) 100 MG capsule     No current facility-administered medications for this visit.     Current Outpatient Medications   Medication Sig Dispense Refill     acetaminophen (TYLENOL) 325 MG tablet Take 650 mg by mouth every 4 hours as needed for mild pain        albuterol (PROAIR HFA/PROVENTIL HFA/VENTOLIN HFA) 108 (90 Base) MCG/ACT inhaler Inhale 2 puffs into the lungs every 4 hours as needed for wheezing       albuterol (PROVENTIL) (2.5 MG/3ML) 0.083% neb solution Take 1 vial (2.5 mg) by nebulization every 4 hours as needed for wheezing or shortness of breath / dyspnea       amLODIPine (NORVASC) 5 MG tablet Take 1 tablet (5 mg) by mouth daily       apixaban ANTICOAGULANT (ELIQUIS) 5 MG tablet Take 1 tablet (5 mg) by mouth 2 times daily 60 tablet      busPIRone HCl (BUSPAR) 30 MG tablet Take 30 mg by mouth 2 times daily       calcium polycarbophil (FIBERCON) 625 MG tablet Take 1 tablet (625 mg) by mouth daily       docusate sodium (COLACE) 100 MG capsule Take 1 capsule (100 mg) by mouth 2  times daily as needed for constipation       FLUoxetine (PROZAC) 20 MG capsule Take 3 capsules (60 mg) by mouth daily 30 capsule 0     furosemide (LASIX) 40 MG tablet Take 1 tablet (40 mg) by mouth daily 30 tablet 0     levETIRAcetam (KEPPRA) 250 MG tablet Take 5 tablets (1,250 mg) by mouth 2 times daily 60 tablet 0     melatonin 3 MG tablet Take 2 tablets (6 mg) by mouth nightly as needed for sleep 30 tablet 0     ondansetron (ZOFRAN) 4 MG tablet Take 1 tablet (4 mg) by mouth every 8 hours as needed for nausea 30 tablet 3     pantoprazole (PROTONIX) 40 MG EC tablet Take 1 tablet (40 mg) by mouth 2 times daily (before meals) 30 tablet 0     polyethylene glycol (MIRALAX) 17 GM/Dose powder Take 17 g by mouth daily as needed for constipation 510 g 0     potassium chloride ER (KLOR-CON M) 20 MEQ CR tablet Take 2 tablets (40 mEq) by mouth daily       sennosides (SENOKOT) 8.6 MG tablet Take 1 tablet by mouth 2 times daily as needed for constipation 30 tablet 0     temozolomide (TEMODAR) 100 MG capsule Take 1 capsule (100 mg) by mouth daily for 28 days Take ondansetron 30-60 min before temozolomide. Take at bedtime on an empty stomach. 28 capsule 0     apixaban ANTICOAGULANT (ELIQUIS) 5 MG tablet Take 1 tablet (5 mg) by mouth 2 times daily (Patient not taking: Reported on 9/10/2021) 60 tablet      temozolomide (TEMODAR) 100 MG capsule Take 1 capsule (100 mg) by mouth daily for 28 days Take ondansetron 30-60 min before temozolomide. Take at bedtime on an empty stomach. 28 capsule 0     temozolomide (TEMODAR) 100 MG capsule Take 1 capsule (100 mg) by mouth daily for 28 days Take ondansetron 30-60 min before temozolomide. Take at bedtime on an empty stomach. 28 capsule 0     DRUG ALLERGIES   Allergies   Allergen Reactions     Bacitracin Rash     Bactroban is effective; No difficulties     Erythromycin      intolerant.     Meperidine Hcl      intolerant only   Demerol     Chloraprep One Step Rash       IMMUNIZATIONS    Immunization History   Administered Date(s) Administered     COVID-19,PF,Moderna 03/18/2021     Flu 65+ Years 09/28/2020     HepB 01/01/1990     Influenza (IIV3) PF 01/01/2001     Influenza, Quad, High Dose, Pf, 65yr+ (Fluzone HD) 09/28/2020     Pneumococcal 23 valent 07/22/2020     Tetanus 06/13/2004     Zoster vaccine recombinant adjuvanted (SHINGRIX) 12/24/2019, 10/12/2020       ONCOLOGIC HISTORY  -2/2021 PRESENTATION: Progressive aphasia.   -2/19/2021 MR brain imaging with 3.3 x 2.8 x 2.8 cm enhancing mass in left frontal-parietal region. A second contrast enhancing lesion (0.5 x 1.0 x 1.0 cm) in right occipital lobe which is dural based and largely stable in size since 9/2011; likely representing a meningioma.  -2/23/2021 SURGERY: Gross total resection by Dr. Cummings  PATHOLOGY: Glioblastoma; IDH1-R132H wild-type/ IDH 1 and 2 wildtype, MGMT promoter methylated. Not BRAF mutated.   -3/23/2021 NEURO-ONC: Recommending chemoradiotherapy.   -3/2021 ADMISSION: SOB and chest pain; CT PA negative, but bilateral DVT noted on U/S. Started on Lovenox. Acute hypoxic respiratory failure in the setting of bilateral pneumonia; presumed PJP, concern for transfusion-related acute lung injury and right hemidiaphragm paralysis. Seizure. Right retroperitoneal hematoma. Ileus. Non-severe malnutrition on TPN with concern for refeeding syndrome. 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 Shock Liver.  -5/4 - 5/27/2021 RADS: 15 fractions.   -5/25/2021 NEURO-ONC/ DEVICE: Discussed the role of Optune; to be considered. Repeat MRI in 4 weeks with plans to start adjuvant temozolomide.   -6/22/2021 NEURO-ONC/ MRB/ CHEMO: Clinically improving. Imaging largely stable. Starting adjuvant temozolomide 150mg/m2 (250mg), cycle 1 (start date 6/24).   -7/20/2021 NEURO-ONC/ CHEMO: Clinically improving. Thrombocytopenia noted with adjuvant temozolomide dosing, platelets of 26K; will transition to metronomic dosing 50mg/m2 (100mg) daily to start once  "platelets are > 80K.    -8/17/2021 NEURO-ONC/ MRB/ CHEMO: Clinically improving. Saw Dr. Gamboa, who recommended starting anticoagulation; on Eliquis. Imaging with positive treatment response. Continue metronomic/ daily dosing at 50mg/m2 (100mg) through 12/2021.    -9/14/2021 NEURO-ONC/ CHEMO: Clinically improving. Continue metronomic/ daily dosing at 50mg/m2 (100mg) through 12/2021.      SOCIAL HISTORY   History   Smoking Status     Never Smoker   Smokeless Tobacco     Never Used    Social History    Substance and Sexual Activity      Alcohol use: Yes        Comment: very rare     History   Drug Use No       PHYSICAL EXAMINATION  Wt 68.9 kg (152 lb)   BMI 26.93 kg/m    Wt Readings from Last 2 Encounters:   09/14/21 68.9 kg (152 lb)   09/10/21 68.9 kg (152 lb)      Ht Readings from Last 2 Encounters:   09/10/21 1.6 m (5' 3\")   08/17/21 1.6 m (5' 2.99\")     KPS: 60-70    -Generally well appearing. Slightly fatigued.   -Respiratory: No increased work of breathing.  -Skin: No facial rashes. Bruising on arms.   -Psychiatric: Normal mood and affect. Pleasant, talkative.  -Neurologic:   MENTAL STATUS:     Alert, oriented.    Speech largely fluent.   Comprehension intact to all commands.     CRANIAL NERVES:     Pupils are equal, round.     Extraocular movements full, denies diplopia.     Visual fields full.     Facial sensation intact to light touch.   No facial droop.   Hearing slightly decreased bilaterally.   Normal phonation.   MOTOR: Mild drift in right arm only (baseline on right; related to shoulder).  SENSATION: Intact to light touch throughout.  COORDINATION: Intact finger-nose with eyes open and closed bilaterally.    The rest of a comprehensive physical examination is deferred due to PHE (public health emergency) video visit restrictions.        MEDICAL RECORDS  Obtained and personally reviewed all available outside medical records in addition to reviewing any records available in our electronic system. "     LABS  Personally reviewed all available lab results; CBC and CMP.     IMAGING  No new neuro-imaging to review.        IMPRESSION  Clinic time for this high complexity encounter was spent discussing in detail the nature of her cancer, providing emotional support, answering questions pertaining to my recommendations, and devising the plan as outlined below.     Clinically, I remain overall pleased with how well Olga is improving. She has resumed rehab therapies and is now more stable with walking with the use of a walker. She has not experienced any recent falls. She is off dexamethasone and with her absolute lymphocyte count > 0.5, there is no indication for the continuation of Bactrim. Energy is low. She was previously using a CPAP, but has not been using it lately. I would recommend resuming her CPAP as a means to improve nighttime sleep quality and reduce daytime fatigue/ somnolence. Olga is on anticoagulation with no signs/ symptoms of concerning bleeding; bruising noted on her arms. Olga is noting bilateral leg swelling, however, with her on anticoagulation and spending much time sitting during the day, the etiology is likely dependent edema, which will improve with her becoming more active and elevating her feet when sitting.     Metronomic-dosed temozolomide remains well tolerated with no significant side effects and labs have remained stable. Mild intermittent nausea, for which she is using Zofran as needed. Denies constipation on current bowel regimen.     PROBLEM LIST  Glioblastoma  Aphasia  Apraxia    PLAN  -CANCER DIRECTED THERAPY-  -Continue daily (metronomic) dosing of temozolomide at 50mg/m2; 100mg/ day.  -Supportive medications; Zofran as needed and bowel regimen only as needed.   -Stopped Bactrim while off steroids and ALC is > 0.5.     -Continue lab monitoring; CBC every 2 weeks and CMP monthly.   -Repeat imaging in 2 months.    -STEROIDS-  -Off dexamethasone.    -SEIZURE  MANAGEMENT-  -Given history of seizures, antiepileptics are indicated.   -Continue Keppra.     -DVT-  -Following with Dr. Gamboa; currently on Eliquis.   -Requires lifelong anticoagulation given cancer diagnosis.   -Will need to consider removal of the IVC filter.     -Quality of life/ MOOD/ FATIGUE-  -Denies any mood issues.  -Continue to monitor mood as untreated/ undertreated depression can worsen fatigue, dysorexia, and quality of life.    -REHAB NEEDS-  -PT for gait instability.   -OT for cognitive changes.   -Consideration for referral to Dr. Agudelo with cancer rehab.     Return to clinic in 1 month (virtually).     In the meantime, Olga and La Nena know to call with questions or concerns or to report new complaints and can be seen sooner if needed.    Stephanie Baker MD  Neuro-oncology

## 2021-09-14 ENCOUNTER — VIRTUAL VISIT (OUTPATIENT)
Dept: ONCOLOGY | Facility: CLINIC | Age: 76
End: 2021-09-14
Attending: PSYCHIATRY & NEUROLOGY
Payer: MEDICARE

## 2021-09-14 VITALS — BODY MASS INDEX: 26.93 KG/M2 | WEIGHT: 152 LBS

## 2021-09-14 DIAGNOSIS — C71.9 GLIOBLASTOMA (H): ICD-10-CM

## 2021-09-14 DIAGNOSIS — C71.9 GLIOBLASTOMA (H): Primary | ICD-10-CM

## 2021-09-14 PROCEDURE — 99214 OFFICE O/P EST MOD 30 MIN: CPT | Mod: 95 | Performed by: PSYCHIATRY & NEUROLOGY

## 2021-09-14 ASSESSMENT — PAIN SCALES - GENERAL: PAINLEVEL: NO PAIN (0)

## 2021-09-14 NOTE — PROGRESS NOTES
Olga is a 76 year old who is being evaluated via a billable video visit.      How would you like to obtain your AVS? MyChart  If the video visit is dropped, the invitation should be resent by: Text to cell phone: [phone]  Will anyone else be joining your video visit? Yes: PRAVEEN Wahl . How would they like to receive their invitation? Text to cell phone:  [phone]      Video-Visit Details  Type of service:  Video Visit  Video Start Time:  10:19 AM  Video End Time: 10:37 AM    Originating Location (pt. Location): Home    Distant Location (provider location):  [facility] [place]     Platform used for Video Visit: Mirtha Turner CMA

## 2021-09-14 NOTE — LETTER
"    9/14/2021         RE: Olga Bailey  [address] [facility] [address]        Dear Colleague,    Thank you for referring your patient, Olga Bailey, to the [facility] [place]. Please see a copy of my visit note below.    NEURO-ONCOLOGY VISIT  Sep 14, 2021    CHIEF COMPLAINT: Ms. Olga Bailey is a 76 year old right-handed woman with a left frontoparietal glioblastoma (IDH wild-type, MGMT promoter methylated), diagnosed following gross total resection on 2/23/2021. Initiation of upfront cancer-directed treatment was delayed due to a complicated hospitalization. Given a resolving infection and lowered functional status, radiation alone was initiated on 5/4/2021 and completed on 5/27/2021.     Olga started adjuvant therapy with 150mg/m2 dosing of temozolomide, however, cycle 1 was complicated by significant hematologic toxicity. Now she is on metronomic dosing and this is well tolerated. Imaging from 8/2021 demonstrates positive treatment effect.    Olga is presenting in follow-up accompanied by Vish (son-in-law).    HISTORY OF PRESENT ILLNESS  -Olga is doing fairly well today.  -She is noting fatigue; \"I could sleep all day.\" Sleeping well at night, however, has not resumed use of her CPAP. Mood is good.   -Overall, tolerating daily-dosed temozolomide well. Mild intermittent nausea, using Zofran as needed. Denies constipation. Labs have remained stable.   -Eating and drinking \"enough\", low appetite.   -She can walk on her own with a walker. Wanting to be more active; currently spending much time sitting in wheelchair/ in bed. Right-sided neglect and right-sided weakness is \"definitely\" improving. No recent falls. Notes leg swelling.   -Mental status is also better, less forgetful. Needing assistance with complex decision making.  -No longer experiencing left V2 division facial pain. No headaches recently.  -Denies changes in sensation.  -Off dexamethasone.   -No " recurrent seizures since her last visit with me, tolerating Keppra.  -On anticoagulation with no significant signs/ symptoms of bleeding.    REVIEW OF SYSTEMS  A comprehensive ROS negative except as in HPI.    MEDICATIONS   Current Outpatient Medications   Medication     acetaminophen (TYLENOL) 325 MG tablet     albuterol (PROAIR HFA/PROVENTIL HFA/VENTOLIN HFA) 108 (90 Base) MCG/ACT inhaler     albuterol (PROVENTIL) (2.5 MG/3ML) 0.083% neb solution     amLODIPine (NORVASC) 5 MG tablet     apixaban ANTICOAGULANT (ELIQUIS) 5 MG tablet     busPIRone HCl (BUSPAR) 30 MG tablet     calcium polycarbophil (FIBERCON) 625 MG tablet     docusate sodium (COLACE) 100 MG capsule     FLUoxetine (PROZAC) 20 MG capsule     furosemide (LASIX) 40 MG tablet     levETIRAcetam (KEPPRA) 250 MG tablet     melatonin 3 MG tablet     ondansetron (ZOFRAN) 4 MG tablet     pantoprazole (PROTONIX) 40 MG EC tablet     polyethylene glycol (MIRALAX) 17 GM/Dose powder     potassium chloride ER (KLOR-CON M) 20 MEQ CR tablet     sennosides (SENOKOT) 8.6 MG tablet     temozolomide (TEMODAR) 100 MG capsule     apixaban ANTICOAGULANT (ELIQUIS) 5 MG tablet     temozolomide (TEMODAR) 100 MG capsule     temozolomide (TEMODAR) 100 MG capsule     No current facility-administered medications for this visit.     Current Outpatient Medications   Medication Sig Dispense Refill     acetaminophen (TYLENOL) 325 MG tablet Take 650 mg by mouth every 4 hours as needed for mild pain        albuterol (PROAIR HFA/PROVENTIL HFA/VENTOLIN HFA) 108 (90 Base) MCG/ACT inhaler Inhale 2 puffs into the lungs every 4 hours as needed for wheezing       albuterol (PROVENTIL) (2.5 MG/3ML) 0.083% neb solution Take 1 vial (2.5 mg) by nebulization every 4 hours as needed for wheezing or shortness of breath / dyspnea       amLODIPine (NORVASC) 5 MG tablet Take 1 tablet (5 mg) by mouth daily       apixaban ANTICOAGULANT (ELIQUIS) 5 MG tablet Take 1 tablet (5 mg) by mouth 2 times daily 60  tablet      busPIRone HCl (BUSPAR) 30 MG tablet Take 30 mg by mouth 2 times daily       calcium polycarbophil (FIBERCON) 625 MG tablet Take 1 tablet (625 mg) by mouth daily       docusate sodium (COLACE) 100 MG capsule Take 1 capsule (100 mg) by mouth 2 times daily as needed for constipation       FLUoxetine (PROZAC) 20 MG capsule Take 3 capsules (60 mg) by mouth daily 30 capsule 0     furosemide (LASIX) 40 MG tablet Take 1 tablet (40 mg) by mouth daily 30 tablet 0     levETIRAcetam (KEPPRA) 250 MG tablet Take 5 tablets (1,250 mg) by mouth 2 times daily 60 tablet 0     melatonin 3 MG tablet Take 2 tablets (6 mg) by mouth nightly as needed for sleep 30 tablet 0     ondansetron (ZOFRAN) 4 MG tablet Take 1 tablet (4 mg) by mouth every 8 hours as needed for nausea 30 tablet 3     pantoprazole (PROTONIX) 40 MG EC tablet Take 1 tablet (40 mg) by mouth 2 times daily (before meals) 30 tablet 0     polyethylene glycol (MIRALAX) 17 GM/Dose powder Take 17 g by mouth daily as needed for constipation 510 g 0     potassium chloride ER (KLOR-CON M) 20 MEQ CR tablet Take 2 tablets (40 mEq) by mouth daily       sennosides (SENOKOT) 8.6 MG tablet Take 1 tablet by mouth 2 times daily as needed for constipation 30 tablet 0     temozolomide (TEMODAR) 100 MG capsule Take 1 capsule (100 mg) by mouth daily for 28 days Take ondansetron 30-60 min before temozolomide. Take at bedtime on an empty stomach. 28 capsule 0     apixaban ANTICOAGULANT (ELIQUIS) 5 MG tablet Take 1 tablet (5 mg) by mouth 2 times daily (Patient not taking: Reported on 9/10/2021) 60 tablet      temozolomide (TEMODAR) 100 MG capsule Take 1 capsule (100 mg) by mouth daily for 28 days Take ondansetron 30-60 min before temozolomide. Take at bedtime on an empty stomach. 28 capsule 0     temozolomide (TEMODAR) 100 MG capsule Take 1 capsule (100 mg) by mouth daily for 28 days Take ondansetron 30-60 min before temozolomide. Take at bedtime on an empty stomach. 28 capsule 0      DRUG ALLERGIES   Allergies   Allergen Reactions     Bacitracin Rash     Bactroban is effective; No difficulties     Erythromycin      intolerant.     Meperidine Hcl      intolerant only   Demerol     Chloraprep One Step Rash       IMMUNIZATIONS   Immunization History   Administered Date(s) Administered     COVID-19,PF,Moderna 03/18/2021     Flu 65+ Years 09/28/2020     HepB 01/01/1990     Influenza (IIV3) PF 01/01/2001     Influenza, Quad, High Dose, Pf, 65yr+ (Fluzone HD) 09/28/2020     Pneumococcal 23 valent 07/22/2020     Tetanus 06/13/2004     Zoster vaccine recombinant adjuvanted (SHINGRIX) 12/24/2019, 10/12/2020       ONCOLOGIC HISTORY  -2/2021 PRESENTATION: Progressive aphasia.   -2/19/2021 MR brain imaging with 3.3 x 2.8 x 2.8 cm enhancing mass in left frontal-parietal region. A second contrast enhancing lesion (0.5 x 1.0 x 1.0 cm) in right occipital lobe which is dural based and largely stable in size since 9/2011; likely representing a meningioma.  -2/23/2021 SURGERY: Gross total resection by Dr. Cummings  PATHOLOGY: Glioblastoma; IDH1-R132H wild-type/ IDH 1 and 2 wildtype, MGMT promoter methylated. Not BRAF mutated.   -3/23/2021 NEURO-ONC: Recommending chemoradiotherapy.   -3/2021 ADMISSION: SOB and chest pain; CT PA negative, but bilateral DVT noted on U/S. Started on Lovenox. Acute hypoxic respiratory failure in the setting of bilateral pneumonia; presumed PJP, concern for transfusion-related acute lung injury and right hemidiaphragm paralysis. Seizure. Right retroperitoneal hematoma. Ileus. Non-severe malnutrition on TPN with concern for refeeding syndrome. 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 Shock Liver.  -5/4 - 5/27/2021 RADS: 15 fractions.   -5/25/2021 NEURO-ONC/ DEVICE: Discussed the role of Optune; to be considered. Repeat MRI in 4 weeks with plans to start adjuvant temozolomide.   -6/22/2021 NEURO-ONC/ MRB/ CHEMO: Clinically improving. Imaging largely stable. Starting adjuvant temozolomide  "150mg/m2 (250mg), cycle 1 (start date 6/24).   -7/20/2021 NEURO-ONC/ CHEMO: Clinically improving. Thrombocytopenia noted with adjuvant temozolomide dosing, platelets of 26K; will transition to metronomic dosing 50mg/m2 (100mg) daily to start once platelets are > 80K.    -8/17/2021 NEURO-ONC/ MRB/ CHEMO: Clinically improving. Saw Dr. Gamboa, who recommended starting anticoagulation; on Eliquis. Imaging with positive treatment response. Continue metronomic/ daily dosing at 50mg/m2 (100mg) through 12/2021.    -9/14/2021 NEURO-ONC/ CHEMO: Clinically improving. Continue metronomic/ daily dosing at 50mg/m2 (100mg) through 12/2021.      SOCIAL HISTORY   History   Smoking Status     Never Smoker   Smokeless Tobacco     Never Used    Social History    Substance and Sexual Activity      Alcohol use: Yes        Comment: very rare     History   Drug Use No       PHYSICAL EXAMINATION  Wt 68.9 kg (152 lb)   BMI 26.93 kg/m    Wt Readings from Last 2 Encounters:   09/14/21 68.9 kg (152 lb)   09/10/21 68.9 kg (152 lb)      Ht Readings from Last 2 Encounters:   09/10/21 1.6 m (5' 3\")   08/17/21 1.6 m (5' 2.99\")     KPS: 60-70    -Generally well appearing. Slightly fatigued.   -Respiratory: No increased work of breathing.  -Skin: No facial rashes. Bruising on arms.   -Psychiatric: Normal mood and affect. Pleasant, talkative.  -Neurologic:   MENTAL STATUS:     Alert, oriented.    Speech largely fluent.   Comprehension intact to all commands.     CRANIAL NERVES:     Pupils are equal, round.     Extraocular movements full, denies diplopia.     Visual fields full.     Facial sensation intact to light touch.   No facial droop.   Hearing slightly decreased bilaterally.   Normal phonation.   MOTOR: Mild drift in right arm only (baseline on right; related to shoulder).  SENSATION: Intact to light touch throughout.  COORDINATION: Intact finger-nose with eyes open and closed bilaterally.    The rest of a comprehensive physical examination is " deferred due to PHE (public health emergency) video visit restrictions.        MEDICAL RECORDS  Obtained and personally reviewed all available outside medical records in addition to reviewing any records available in our electronic system.     LABS  Personally reviewed all available lab results; CBC and CMP.     IMAGING  No new neuro-imaging to review.        IMPRESSION  Clinic time for this high complexity encounter was spent discussing in detail the nature of her cancer, providing emotional support, answering questions pertaining to my recommendations, and devising the plan as outlined below.     Clinically, I remain overall pleased with how well Olga is improving. She has resumed rehab therapies and is now more stable with walking with the use of a walker. She has not experienced any recent falls. She is off dexamethasone and with her absolute lymphocyte count > 0.5, there is no indication for the continuation of Bactrim. Energy is low. She was previously using a CPAP, but has not been using it lately. I would recommend resuming her CPAP as a means to improve nighttime sleep quality and reduce daytime fatigue/ somnolence. Olga is on anticoagulation with no signs/ symptoms of concerning bleeding; bruising noted on her arms. Olga is noting bilateral leg swelling, however, with her on anticoagulation and spending much time sitting during the day, the etiology is likely dependent edema, which will improve with her becoming more active and elevating her feet when sitting.     Metronomic-dosed temozolomide remains well tolerated with no significant side effects and labs have remained stable. Mild intermittent nausea, for which she is using Zofran as needed. Denies constipation on current bowel regimen.     PROBLEM LIST  Glioblastoma  Aphasia  Apraxia    PLAN  -CANCER DIRECTED THERAPY-  -Continue daily (metronomic) dosing of temozolomide at 50mg/m2; 100mg/ day.  -Supportive medications; Zofran as needed and  bowel regimen only as needed.   -Stopped Bactrim while off steroids and ALC is > 0.5.     -Continue lab monitoring; CBC every 2 weeks and CMP monthly.   -Repeat imaging in 2 months.    -STEROIDS-  -Off dexamethasone.    -SEIZURE MANAGEMENT-  -Given history of seizures, antiepileptics are indicated.   -Continue Keppra.     -DVT-  -Following with Dr. Gamboa; currently on Eliquis.   -Requires lifelong anticoagulation given cancer diagnosis.   -Will need to consider removal of the IVC filter.     -Quality of life/ MOOD/ FATIGUE-  -Denies any mood issues.  -Continue to monitor mood as untreated/ undertreated depression can worsen fatigue, dysorexia, and quality of life.    -REHAB NEEDS-  -PT for gait instability.   -OT for cognitive changes.   -Consideration for referral to Dr. Agudelo with cancer rehab.     Return to clinic in 1 month (virtually).     In the meantime, Olga and La Nena know to call with questions or concerns or to report new complaints and can be seen sooner if needed.    Stephanie Baker MD  Neuro-oncology      Olga is a 76 year old who is being evaluated via a billable video visit.      How would you like to obtain your AVS? MyChart  If the video visit is dropped, the invitation should be resent by: Text to cell phone: [phone]  Will anyone else be joining your video visit? Yes: PRAVEEN Wahl . How would they like to receive their invitation? Text to cell phone:  [phone]      Video-Visit Details  Type of service:  Video Visit  Video Start Time:  10:19 AM  Video End Time: 10:37 AM    Originating Location (pt. Location): Home    Distant Location (provider location):  [facility]     Platform used for Video Visit: Mirtha Turner CMA        Again, thank you for allowing me to participate in the care of your patient.        Sincerely,        Stephanie Baker MD

## 2021-09-14 NOTE — LETTER
"    9/14/2021         RE: Olga Bailey  [address] [facility] [address]        Dear Colleague,    Thank you for referring your patient, Olga Bailey, to the [facility] [place]. Please see a copy of my visit note below.    NEURO-ONCOLOGY VISIT  Sep 14, 2021    CHIEF COMPLAINT: Ms. Olga Bailey is a 76 year old right-handed woman with a left frontoparietal glioblastoma (IDH wild-type, MGMT promoter methylated), diagnosed following gross total resection on 2/23/2021. Initiation of upfront cancer-directed treatment was delayed due to a complicated hospitalization. Given a resolving infection and lowered functional status, radiation alone was initiated on 5/4/2021 and completed on 5/27/2021.     Olga started adjuvant therapy with 150mg/m2 dosing of temozolomide, however, cycle 1 was complicated by significant hematologic toxicity. Now she is on metronomic dosing and this is well tolerated. Imaging from 8/2021 demonstrates positive treatment effect.    Olga is presenting in follow-up accompanied by Vish (son-in-law).    HISTORY OF PRESENT ILLNESS  -Olga is doing fairly well today.  -She is noting fatigue; \"I could sleep all day.\" Sleeping well at night, however, has not resumed use of her CPAP. Mood is good.   -Overall, tolerating daily-dosed temozolomide well. Mild intermittent nausea, using Zofran as needed. Denies constipation. Labs have remained stable.   -Eating and drinking \"enough\", low appetite.   -She can walk on her own with a walker. Wanting to be more active; currently spending much time sitting in wheelchair/ in bed. Right-sided neglect and right-sided weakness is \"definitely\" improving. No recent falls. Notes leg swelling.   -Mental status is also better, less forgetful. Needing assistance with complex decision making.  -No longer experiencing left V2 division facial pain. No headaches recently.  -Denies changes in sensation.  -Off dexamethasone.   -No " recurrent seizures since her last visit with me, tolerating Keppra.  -On anticoagulation with no significant signs/ symptoms of bleeding.    REVIEW OF SYSTEMS  A comprehensive ROS negative except as in HPI.    MEDICATIONS   Current Outpatient Medications   Medication     acetaminophen (TYLENOL) 325 MG tablet     albuterol (PROAIR HFA/PROVENTIL HFA/VENTOLIN HFA) 108 (90 Base) MCG/ACT inhaler     albuterol (PROVENTIL) (2.5 MG/3ML) 0.083% neb solution     amLODIPine (NORVASC) 5 MG tablet     apixaban ANTICOAGULANT (ELIQUIS) 5 MG tablet     busPIRone HCl (BUSPAR) 30 MG tablet     calcium polycarbophil (FIBERCON) 625 MG tablet     docusate sodium (COLACE) 100 MG capsule     FLUoxetine (PROZAC) 20 MG capsule     furosemide (LASIX) 40 MG tablet     levETIRAcetam (KEPPRA) 250 MG tablet     melatonin 3 MG tablet     ondansetron (ZOFRAN) 4 MG tablet     pantoprazole (PROTONIX) 40 MG EC tablet     polyethylene glycol (MIRALAX) 17 GM/Dose powder     potassium chloride ER (KLOR-CON M) 20 MEQ CR tablet     sennosides (SENOKOT) 8.6 MG tablet     temozolomide (TEMODAR) 100 MG capsule     apixaban ANTICOAGULANT (ELIQUIS) 5 MG tablet     temozolomide (TEMODAR) 100 MG capsule     temozolomide (TEMODAR) 100 MG capsule     No current facility-administered medications for this visit.     Current Outpatient Medications   Medication Sig Dispense Refill     acetaminophen (TYLENOL) 325 MG tablet Take 650 mg by mouth every 4 hours as needed for mild pain        albuterol (PROAIR HFA/PROVENTIL HFA/VENTOLIN HFA) 108 (90 Base) MCG/ACT inhaler Inhale 2 puffs into the lungs every 4 hours as needed for wheezing       albuterol (PROVENTIL) (2.5 MG/3ML) 0.083% neb solution Take 1 vial (2.5 mg) by nebulization every 4 hours as needed for wheezing or shortness of breath / dyspnea       amLODIPine (NORVASC) 5 MG tablet Take 1 tablet (5 mg) by mouth daily       apixaban ANTICOAGULANT (ELIQUIS) 5 MG tablet Take 1 tablet (5 mg) by mouth 2 times daily 60  tablet      busPIRone HCl (BUSPAR) 30 MG tablet Take 30 mg by mouth 2 times daily       calcium polycarbophil (FIBERCON) 625 MG tablet Take 1 tablet (625 mg) by mouth daily       docusate sodium (COLACE) 100 MG capsule Take 1 capsule (100 mg) by mouth 2 times daily as needed for constipation       FLUoxetine (PROZAC) 20 MG capsule Take 3 capsules (60 mg) by mouth daily 30 capsule 0     furosemide (LASIX) 40 MG tablet Take 1 tablet (40 mg) by mouth daily 30 tablet 0     levETIRAcetam (KEPPRA) 250 MG tablet Take 5 tablets (1,250 mg) by mouth 2 times daily 60 tablet 0     melatonin 3 MG tablet Take 2 tablets (6 mg) by mouth nightly as needed for sleep 30 tablet 0     ondansetron (ZOFRAN) 4 MG tablet Take 1 tablet (4 mg) by mouth every 8 hours as needed for nausea 30 tablet 3     pantoprazole (PROTONIX) 40 MG EC tablet Take 1 tablet (40 mg) by mouth 2 times daily (before meals) 30 tablet 0     polyethylene glycol (MIRALAX) 17 GM/Dose powder Take 17 g by mouth daily as needed for constipation 510 g 0     potassium chloride ER (KLOR-CON M) 20 MEQ CR tablet Take 2 tablets (40 mEq) by mouth daily       sennosides (SENOKOT) 8.6 MG tablet Take 1 tablet by mouth 2 times daily as needed for constipation 30 tablet 0     temozolomide (TEMODAR) 100 MG capsule Take 1 capsule (100 mg) by mouth daily for 28 days Take ondansetron 30-60 min before temozolomide. Take at bedtime on an empty stomach. 28 capsule 0     apixaban ANTICOAGULANT (ELIQUIS) 5 MG tablet Take 1 tablet (5 mg) by mouth 2 times daily (Patient not taking: Reported on 9/10/2021) 60 tablet      temozolomide (TEMODAR) 100 MG capsule Take 1 capsule (100 mg) by mouth daily for 28 days Take ondansetron 30-60 min before temozolomide. Take at bedtime on an empty stomach. 28 capsule 0     temozolomide (TEMODAR) 100 MG capsule Take 1 capsule (100 mg) by mouth daily for 28 days Take ondansetron 30-60 min before temozolomide. Take at bedtime on an empty stomach. 28 capsule 0      DRUG ALLERGIES   Allergies   Allergen Reactions     Bacitracin Rash     Bactroban is effective; No difficulties     Erythromycin      intolerant.     Meperidine Hcl      intolerant only   Demerol     Chloraprep One Step Rash       IMMUNIZATIONS   Immunization History   Administered Date(s) Administered     COVID-19,PF,Moderna 03/18/2021     Flu 65+ Years 09/28/2020     HepB 01/01/1990     Influenza (IIV3) PF 01/01/2001     Influenza, Quad, High Dose, Pf, 65yr+ (Fluzone HD) 09/28/2020     Pneumococcal 23 valent 07/22/2020     Tetanus 06/13/2004     Zoster vaccine recombinant adjuvanted (SHINGRIX) 12/24/2019, 10/12/2020       ONCOLOGIC HISTORY  -2/2021 PRESENTATION: Progressive aphasia.   -2/19/2021 MR brain imaging with 3.3 x 2.8 x 2.8 cm enhancing mass in left frontal-parietal region. A second contrast enhancing lesion (0.5 x 1.0 x 1.0 cm) in right occipital lobe which is dural based and largely stable in size since 9/2011; likely representing a meningioma.  -2/23/2021 SURGERY: Gross total resection by Dr. Cummings  PATHOLOGY: Glioblastoma; IDH1-R132H wild-type/ IDH 1 and 2 wildtype, MGMT promoter methylated. Not BRAF mutated.   -3/23/2021 NEURO-ONC: Recommending chemoradiotherapy.   -3/2021 ADMISSION: SOB and chest pain; CT PA negative, but bilateral DVT noted on U/S. Started on Lovenox. Acute hypoxic respiratory failure in the setting of bilateral pneumonia; presumed PJP, concern for transfusion-related acute lung injury and right hemidiaphragm paralysis. Seizure. Right retroperitoneal hematoma. Ileus. Non-severe malnutrition on TPN with concern for refeeding syndrome. 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 Shock Liver.  -5/4 - 5/27/2021 RADS: 15 fractions.   -5/25/2021 NEURO-ONC/ DEVICE: Discussed the role of Optune; to be considered. Repeat MRI in 4 weeks with plans to start adjuvant temozolomide.   -6/22/2021 NEURO-ONC/ MRB/ CHEMO: Clinically improving. Imaging largely stable. Starting adjuvant temozolomide  "150mg/m2 (250mg), cycle 1 (start date 6/24).   -7/20/2021 NEURO-ONC/ CHEMO: Clinically improving. Thrombocytopenia noted with adjuvant temozolomide dosing, platelets of 26K; will transition to metronomic dosing 50mg/m2 (100mg) daily to start once platelets are > 80K.    -8/17/2021 NEURO-ONC/ MRB/ CHEMO: Clinically improving. Saw Dr. Gamboa, who recommended starting anticoagulation; on Eliquis. Imaging with positive treatment response. Continue metronomic/ daily dosing at 50mg/m2 (100mg) through 12/2021.    -9/14/2021 NEURO-ONC/ CHEMO: Clinically improving. Continue metronomic/ daily dosing at 50mg/m2 (100mg) through 12/2021.      SOCIAL HISTORY   History   Smoking Status     Never Smoker   Smokeless Tobacco     Never Used    Social History    Substance and Sexual Activity      Alcohol use: Yes        Comment: very rare     History   Drug Use No       PHYSICAL EXAMINATION  Wt 68.9 kg (152 lb)   BMI 26.93 kg/m    Wt Readings from Last 2 Encounters:   09/14/21 68.9 kg (152 lb)   09/10/21 68.9 kg (152 lb)      Ht Readings from Last 2 Encounters:   09/10/21 1.6 m (5' 3\")   08/17/21 1.6 m (5' 2.99\")     KPS: 60-70    -Generally well appearing. Slightly fatigued.   -Respiratory: No increased work of breathing.  -Skin: No facial rashes. Bruising on arms.   -Psychiatric: Normal mood and affect. Pleasant, talkative.  -Neurologic:   MENTAL STATUS:     Alert, oriented.    Speech largely fluent.   Comprehension intact to all commands.     CRANIAL NERVES:     Pupils are equal, round.     Extraocular movements full, denies diplopia.     Visual fields full.     Facial sensation intact to light touch.   No facial droop.   Hearing slightly decreased bilaterally.   Normal phonation.   MOTOR: Mild drift in right arm only (baseline on right; related to shoulder).  SENSATION: Intact to light touch throughout.  COORDINATION: Intact finger-nose with eyes open and closed bilaterally.    The rest of a comprehensive physical examination is " deferred due to PHE (public health emergency) video visit restrictions.        MEDICAL RECORDS  Obtained and personally reviewed all available outside medical records in addition to reviewing any records available in our electronic system.     LABS  Personally reviewed all available lab results; CBC and CMP.     IMAGING  No new neuro-imaging to review.        IMPRESSION  Clinic time for this high complexity encounter was spent discussing in detail the nature of her cancer, providing emotional support, answering questions pertaining to my recommendations, and devising the plan as outlined below.     Clinically, I remain overall pleased with how well Olga is improving. She has resumed rehab therapies and is now more stable with walking with the use of a walker. She has not experienced any recent falls. She is off dexamethasone and with her absolute lymphocyte count > 0.5, there is no indication for the continuation of Bactrim. Energy is low. She was previously using a CPAP, but has not been using it lately. I would recommend resuming her CPAP as a means to improve nighttime sleep quality and reduce daytime fatigue/ somnolence. Olga is on anticoagulation with no signs/ symptoms of concerning bleeding; bruising noted on her arms. Olga is noting bilateral leg swelling, however, with her on anticoagulation and spending much time sitting during the day, the etiology is likely dependent edema, which will improve with her becoming more active and elevating her feet when sitting.     Metronomic-dosed temozolomide remains well tolerated with no significant side effects and labs have remained stable. Mild intermittent nausea, for which she is using Zofran as needed. Denies constipation on current bowel regimen.     PROBLEM LIST  Glioblastoma  Aphasia  Apraxia    PLAN  -CANCER DIRECTED THERAPY-  -Continue daily (metronomic) dosing of temozolomide at 50mg/m2; 100mg/ day.  -Supportive medications; Zofran as needed and  bowel regimen only as needed.   -Stopped Bactrim while off steroids and ALC is > 0.5.     -Continue lab monitoring; CBC every 2 weeks and CMP monthly.   -Repeat imaging in 2 months.    -STEROIDS-  -Off dexamethasone.    -SEIZURE MANAGEMENT-  -Given history of seizures, antiepileptics are indicated.   -Continue Keppra.     -DVT-  -Following with Dr. Gamboa; currently on Eliquis.   -Requires lifelong anticoagulation given cancer diagnosis.   -Will need to consider removal of the IVC filter.     -Quality of life/ MOOD/ FATIGUE-  -Denies any mood issues.  -Continue to monitor mood as untreated/ undertreated depression can worsen fatigue, dysorexia, and quality of life.    -REHAB NEEDS-  -PT for gait instability.   -OT for cognitive changes.   -Consideration for referral to Dr. Agudelo with cancer rehab.     Return to clinic in 1 month (virtually).     In the meantime, Olga and La Nena know to call with questions or concerns or to report new complaints and can be seen sooner if needed.    Stephanie Baker MD  Neuro-oncology      Olga is a 76 year old who is being evaluated via a billable video visit.      How would you like to obtain your AVS? MyChart  If the video visit is dropped, the invitation should be resent by: Text to cell phone: [phone]  Will anyone else be joining your video visit? Yes: PRAVEEN Wahl . How would they like to receive their invitation? Text to cell phone:  [phone]      Video-Visit Details  Type of service:  Video Visit  Video Start Time:  10:19 AM  Video End Time: 10:37 AM    Originating Location (pt. Location): Home    Distant Location (provider location):  [facility]     Platform used for Video Visit: Mirtha Turner CMA        Again, thank you for allowing me to participate in the care of your patient.        Sincerely,        Stephanie Baker MD

## 2021-09-15 ENCOUNTER — LAB REQUISITION (OUTPATIENT)
Dept: LAB | Facility: CLINIC | Age: 76
End: 2021-09-15
Payer: MEDICARE

## 2021-09-15 DIAGNOSIS — D61.810 ANTINEOPLASTIC CHEMOTHERAPY INDUCED PANCYTOPENIA (CODE) (H): ICD-10-CM

## 2021-09-16 ENCOUNTER — TRANSFERRED RECORDS (OUTPATIENT)
Dept: HEALTH INFORMATION MANAGEMENT | Facility: CLINIC | Age: 76
End: 2021-09-16

## 2021-09-16 LAB
BASOPHILS # BLD AUTO: 0 10E3/UL (ref 0–0.2)
BASOPHILS NFR BLD AUTO: 0 %
EOSINOPHIL # BLD AUTO: 0.4 10E3/UL (ref 0–0.7)
EOSINOPHIL NFR BLD AUTO: 7 %
ERYTHROCYTE [DISTWIDTH] IN BLOOD BY AUTOMATED COUNT: 15.3 % (ref 10–15)
HCT VFR BLD AUTO: 33.2 % (ref 35–47)
HGB BLD-MCNC: 10.7 G/DL (ref 11.7–15.7)
IMM GRANULOCYTES # BLD: 0 10E3/UL
IMM GRANULOCYTES NFR BLD: 0 %
LYMPHOCYTES # BLD AUTO: 0.3 10E3/UL (ref 0.8–5.3)
LYMPHOCYTES NFR BLD AUTO: 5 %
MCH RBC QN AUTO: 31.3 PG (ref 26.5–33)
MCHC RBC AUTO-ENTMCNC: 32.2 G/DL (ref 31.5–36.5)
MCV RBC AUTO: 97 FL (ref 78–100)
MONOCYTES # BLD AUTO: 0.6 10E3/UL (ref 0–1.3)
MONOCYTES NFR BLD AUTO: 11 %
NEUTROPHILS # BLD AUTO: 3.9 10E3/UL (ref 1.6–8.3)
NEUTROPHILS NFR BLD AUTO: 77 %
NRBC # BLD AUTO: 0 10E3/UL
NRBC BLD AUTO-RTO: 0 /100
PLATELET # BLD AUTO: 163 10E3/UL (ref 150–450)
RBC # BLD AUTO: 3.42 10E6/UL (ref 3.8–5.2)
WBC # BLD AUTO: 5.1 10E3/UL (ref 4–11)

## 2021-09-16 PROCEDURE — 85025 COMPLETE CBC W/AUTO DIFF WBC: CPT | Mod: ORL | Performed by: PSYCHIATRY & NEUROLOGY

## 2021-09-16 PROCEDURE — 36415 COLL VENOUS BLD VENIPUNCTURE: CPT | Mod: ORL | Performed by: PSYCHIATRY & NEUROLOGY

## 2021-09-16 PROCEDURE — P9603 ONE-WAY ALLOW PRORATED MILES: HCPCS | Mod: ORL | Performed by: PSYCHIATRY & NEUROLOGY

## 2021-09-17 ENCOUNTER — DOCUMENTATION ONLY (OUTPATIENT)
Dept: PHARMACY | Facility: CLINIC | Age: 76
End: 2021-09-17

## 2021-09-17 ENCOUNTER — TELEPHONE (OUTPATIENT)
Dept: ONCOLOGY | Facility: CLINIC | Age: 76
End: 2021-09-17

## 2021-09-17 DIAGNOSIS — C71.9 GBM (GLIOBLASTOMA MULTIFORME) (H): Primary | ICD-10-CM

## 2021-09-17 NOTE — PROGRESS NOTES
Oral Chemotherapy Monitoring Program.    Patient currently on metronomic temozolomide therapy.    Reviewed labs from 9/17/21. No concerning abnormalities.    Proceed with metronomic temozolomide. Repeat labs in 2 weeks.     Will follow up in 2 weeks with repeat labs.     Brigid HansenD  September 17, 2021

## 2021-09-17 NOTE — TELEPHONE ENCOUNTER
Received a call from Olga's daughter La Nena.  She reports that Medicare is not covering the anticoagulation (Eliquis).    This was recommended by Dr. Gamboa, but ordered by facility KAYCE Galindo.    Routing to pharmacy financial lialaura Kinsey to advise.    Erich Hodges, BSN, RN, OCN  Oncology Care Coordinator  [facility]

## 2021-09-17 NOTE — TELEPHONE ENCOUNTER
Called [employer] to check coverage for Eliquis, this is covered by the patients medicare part D plan([facility]) but she has a high copay, $234.80. She last filled this on 8/20/2021.    I called [facility] part D/optum, Eliquis is considered non formulary so she is charged a 25% coinsurance. Xarelto is the preferred drug and has a $40 copay. I asked if Xarelto was not an option could we apply for a tier exception and they said non-formulary drugs are excluded from tier exceptions.    Can she switch to Xarelto?    Routing to  and RN's.    Amelie Goetz, [facility] Oncology Pharmacy Liaison  [phone]

## 2021-09-20 NOTE — TELEPHONE ENCOUNTER
"Discussed with Dr. Gamboa who is managing Olga's Eliquis. Per Dr. Gamboa ok to change to Xarelto.     \"Xarelto 20 mg once a day. Start 12 to 24 hours after stoping eliquis\"    Updated patient's daughter La Nena on plan.     Spoke with ELEANOR Jimenes at [facility]. Confirmed plan to stop Eliquis and start Xarelto. Rx sent to preferred pharmacy, faxed Rx/instructions to ELEANOR Jimenes. No further questions or concerns.      Luoise Jerry, JIANN, RN, PHN, OCN  Oncology Care Coordinator  [facility]          "

## 2021-09-28 RX ORDER — TEMOZOLOMIDE 100 MG/1
50 CAPSULE ORAL DAILY
Qty: 28 CAPSULE | Refills: 0 | Status: SHIPPED | OUTPATIENT
Start: 2021-09-28 | End: 2021-10-28

## 2021-09-29 ENCOUNTER — LAB REQUISITION (OUTPATIENT)
Dept: LAB | Facility: CLINIC | Age: 76
End: 2021-09-29
Payer: MEDICARE

## 2021-09-29 DIAGNOSIS — D61.810 ANTINEOPLASTIC CHEMOTHERAPY INDUCED PANCYTOPENIA (CODE) (H): ICD-10-CM

## 2021-09-29 DIAGNOSIS — R11.2 NAUSEA WITH VOMITING, UNSPECIFIED: ICD-10-CM

## 2021-09-30 ENCOUNTER — MEDICAL CORRESPONDENCE (OUTPATIENT)
Dept: HEALTH INFORMATION MANAGEMENT | Facility: CLINIC | Age: 76
End: 2021-09-30

## 2021-10-01 LAB
ALBUMIN SERPL-MCNC: 3.7 G/DL (ref 3.5–5)
ALP SERPL-CCNC: 79 U/L (ref 45–120)
ALT SERPL W P-5'-P-CCNC: 12 U/L (ref 0–45)
ANION GAP SERPL CALCULATED.3IONS-SCNC: 12 MMOL/L (ref 5–18)
AST SERPL W P-5'-P-CCNC: 14 U/L (ref 0–40)
BASOPHILS # BLD AUTO: 0 10E3/UL (ref 0–0.2)
BASOPHILS NFR BLD AUTO: 0 %
BILIRUB SERPL-MCNC: 0.4 MG/DL (ref 0–1)
BUN SERPL-MCNC: 15 MG/DL (ref 8–28)
CALCIUM SERPL-MCNC: 9.3 MG/DL (ref 8.5–10.5)
CHLORIDE BLD-SCNC: 105 MMOL/L (ref 98–107)
CO2 SERPL-SCNC: 24 MMOL/L (ref 22–31)
CREAT SERPL-MCNC: 0.88 MG/DL (ref 0.6–1.1)
EOSINOPHIL # BLD AUTO: 0.2 10E3/UL (ref 0–0.7)
EOSINOPHIL NFR BLD AUTO: 5 %
ERYTHROCYTE [DISTWIDTH] IN BLOOD BY AUTOMATED COUNT: 14.7 % (ref 10–15)
GFR SERPL CREATININE-BSD FRML MDRD: 64 ML/MIN/1.73M2
GLUCOSE BLD-MCNC: 95 MG/DL (ref 70–125)
HCT VFR BLD AUTO: 34.9 % (ref 35–47)
HGB BLD-MCNC: 11.4 G/DL (ref 11.7–15.7)
IMM GRANULOCYTES # BLD: 0 10E3/UL
IMM GRANULOCYTES NFR BLD: 0 %
LYMPHOCYTES # BLD AUTO: 0.2 10E3/UL (ref 0.8–5.3)
LYMPHOCYTES NFR BLD AUTO: 5 %
MCH RBC QN AUTO: 31.1 PG (ref 26.5–33)
MCHC RBC AUTO-ENTMCNC: 32.7 G/DL (ref 31.5–36.5)
MCV RBC AUTO: 95 FL (ref 78–100)
MONOCYTES # BLD AUTO: 0.5 10E3/UL (ref 0–1.3)
MONOCYTES NFR BLD AUTO: 9 %
NEUTROPHILS # BLD AUTO: 4.1 10E3/UL (ref 1.6–8.3)
NEUTROPHILS NFR BLD AUTO: 81 %
NRBC # BLD AUTO: 0 10E3/UL
NRBC BLD AUTO-RTO: 0 /100
PLATELET # BLD AUTO: 181 10E3/UL (ref 150–450)
POTASSIUM BLD-SCNC: 3.9 MMOL/L (ref 3.5–5)
PROT SERPL-MCNC: 5.8 G/DL (ref 6–8)
RBC # BLD AUTO: 3.66 10E6/UL (ref 3.8–5.2)
SODIUM SERPL-SCNC: 141 MMOL/L (ref 136–145)
WBC # BLD AUTO: 5.1 10E3/UL (ref 4–11)

## 2021-10-01 PROCEDURE — P9603 ONE-WAY ALLOW PRORATED MILES: HCPCS | Mod: ORL | Performed by: PSYCHIATRY & NEUROLOGY

## 2021-10-01 PROCEDURE — 85025 COMPLETE CBC W/AUTO DIFF WBC: CPT | Mod: ORL | Performed by: PSYCHIATRY & NEUROLOGY

## 2021-10-01 PROCEDURE — 80053 COMPREHEN METABOLIC PANEL: CPT | Mod: ORL | Performed by: PSYCHIATRY & NEUROLOGY

## 2021-10-01 PROCEDURE — 36415 COLL VENOUS BLD VENIPUNCTURE: CPT | Mod: ORL | Performed by: PSYCHIATRY & NEUROLOGY

## 2021-10-02 ENCOUNTER — LAB REQUISITION (OUTPATIENT)
Dept: LAB | Facility: CLINIC | Age: 76
End: 2021-10-02
Payer: MEDICARE

## 2021-10-02 DIAGNOSIS — I10 ESSENTIAL (PRIMARY) HYPERTENSION: ICD-10-CM

## 2021-10-04 ENCOUNTER — DOCUMENTATION ONLY (OUTPATIENT)
Dept: PHARMACY | Facility: CLINIC | Age: 76
End: 2021-10-04

## 2021-10-04 NOTE — PROGRESS NOTES
Oral Chemotherapy Monitoring Program.    Patient currently on metronomic temozolomide therapy.    Reviewed labs from 10/1/21. No concerning abnormalities.  Repeat labs in 2 weeks. Continue temzolomide.     Questions answered to patient's satisfaction.    Will follow up in 2 weeks with labs.     Brigid Mcintyre PharmD  October 4, 2021

## 2021-10-05 PROCEDURE — P9603 ONE-WAY ALLOW PRORATED MILES: HCPCS | Mod: ORL | Performed by: NURSE PRACTITIONER

## 2021-10-05 PROCEDURE — 36415 COLL VENOUS BLD VENIPUNCTURE: CPT | Mod: ORL | Performed by: NURSE PRACTITIONER

## 2021-10-05 PROCEDURE — 84443 ASSAY THYROID STIM HORMONE: CPT | Mod: ORL | Performed by: NURSE PRACTITIONER

## 2021-10-05 PROCEDURE — 80048 BASIC METABOLIC PNL TOTAL CA: CPT | Mod: ORL | Performed by: NURSE PRACTITIONER

## 2021-10-05 PROCEDURE — 85025 COMPLETE CBC W/AUTO DIFF WBC: CPT | Mod: ORL | Performed by: NURSE PRACTITIONER

## 2021-10-06 LAB
ANION GAP SERPL CALCULATED.3IONS-SCNC: 11 MMOL/L (ref 5–18)
BASOPHILS # BLD AUTO: 0 10E3/UL (ref 0–0.2)
BASOPHILS NFR BLD AUTO: 0 %
BUN SERPL-MCNC: 15 MG/DL (ref 8–28)
CALCIUM SERPL-MCNC: 9.4 MG/DL (ref 8.5–10.5)
CHLORIDE BLD-SCNC: 107 MMOL/L (ref 98–107)
CO2 SERPL-SCNC: 24 MMOL/L (ref 22–31)
CREAT SERPL-MCNC: 0.87 MG/DL (ref 0.6–1.1)
EOSINOPHIL # BLD AUTO: 0.2 10E3/UL (ref 0–0.7)
EOSINOPHIL NFR BLD AUTO: 5 %
ERYTHROCYTE [DISTWIDTH] IN BLOOD BY AUTOMATED COUNT: 14.9 % (ref 10–15)
GFR SERPL CREATININE-BSD FRML MDRD: 65 ML/MIN/1.73M2
GLUCOSE BLD-MCNC: 97 MG/DL (ref 70–125)
HCT VFR BLD AUTO: 33.9 % (ref 35–47)
HGB BLD-MCNC: 10.9 G/DL (ref 11.7–15.7)
IMM GRANULOCYTES # BLD: 0 10E3/UL
IMM GRANULOCYTES NFR BLD: 0 %
LYMPHOCYTES # BLD AUTO: 0.2 10E3/UL (ref 0.8–5.3)
LYMPHOCYTES NFR BLD AUTO: 3 %
MCH RBC QN AUTO: 31.5 PG (ref 26.5–33)
MCHC RBC AUTO-ENTMCNC: 32.2 G/DL (ref 31.5–36.5)
MCV RBC AUTO: 98 FL (ref 78–100)
MONOCYTES # BLD AUTO: 0.6 10E3/UL (ref 0–1.3)
MONOCYTES NFR BLD AUTO: 12 %
NEUTROPHILS # BLD AUTO: 4 10E3/UL (ref 1.6–8.3)
NEUTROPHILS NFR BLD AUTO: 80 %
NRBC # BLD AUTO: 0 10E3/UL
NRBC BLD AUTO-RTO: 0 /100
PLATELET # BLD AUTO: 181 10E3/UL (ref 150–450)
POTASSIUM BLD-SCNC: 4 MMOL/L (ref 3.5–5)
RBC # BLD AUTO: 3.46 10E6/UL (ref 3.8–5.2)
SODIUM SERPL-SCNC: 142 MMOL/L (ref 136–145)
TSH SERPL DL<=0.005 MIU/L-ACNC: 2.19 UIU/ML (ref 0.3–5)
WBC # BLD AUTO: 5 10E3/UL (ref 4–11)

## 2021-10-11 NOTE — PROGRESS NOTES
"NEURO-ONCOLOGY VISIT  Oct 12, 2021    CHIEF COMPLAINT: Ms. Olga Bailey is a 76 year old right-handed woman with a left frontoparietal glioblastoma (IDH wild-type, MGMT promoter methylated), diagnosed following gross total resection on 2/23/2021. Initiation of upfront cancer-directed treatment was delayed due to a complicated hospitalization. Given a resolving infection and lowered functional status, radiation alone was initiated on 5/4/2021 and completed on 5/27/2021.     Olga started adjuvant therapy with 150mg/m2 dosing of temozolomide, however, cycle 1 was complicated by significant hematologic toxicity. Dosing was changed to metronomic/ daily dosing in 7/2021 and this was well tolerated. Imaging from 8/2021 demonstrates positive treatment effect.    Chemotherapy was placed on a hold in 10/2021 in the setting of severe constipation.    Olga is presenting in follow-up accompanied by her daughter, La Nena.    HISTORY OF PRESENT ILLNESS  -Olga is doing poorly today.  -She is noting fatigue; \"I could sleep all day.\" Sleeping well at night, however, has not resumed use of her CPAP yet.   -Mood is good.   -Overall, tolerating daily-dosed temozolomide well.   -Notable nausea, using Zofran daily without significant relief.   -Endorsing constipation and mild abdominal pain. Noting significant anorexia; eating and drinking is decreased, low appetite.   -She can walk on her own with a walker. Graduated PT/OT/SLP. No recent falls. Notes stable leg swelling.   -Mental status is stable. Needing assistance with complex decision making.  -No headaches recently.  -Dyspnea on exertion with walking, no chest pain. Feeling deconditioned.   -Denies changes in sensation.  -Off dexamethasone.   -No recurrent seizures since her last visit, tolerating Keppra.  -On anticoagulation with no significant signs/ symptoms of bleeding.    REVIEW OF SYSTEMS  A comprehensive ROS negative except as in HPI.    MEDICATIONS   Current " Outpatient Medications   Medication     acetaminophen (TYLENOL) 325 MG tablet     albuterol (PROAIR HFA/PROVENTIL HFA/VENTOLIN HFA) 108 (90 Base) MCG/ACT inhaler     albuterol (PROVENTIL) (2.5 MG/3ML) 0.083% neb solution     amLODIPine (NORVASC) 5 MG tablet     busPIRone HCl (BUSPAR) 30 MG tablet     calcium polycarbophil (FIBERCON) 625 MG tablet     docusate sodium (COLACE) 100 MG capsule     FLUoxetine (PROZAC) 20 MG capsule     furosemide (LASIX) 40 MG tablet     levETIRAcetam (KEPPRA) 250 MG tablet     melatonin 3 MG tablet     ondansetron (ZOFRAN) 4 MG tablet     pantoprazole (PROTONIX) 40 MG EC tablet     polyethylene glycol (MIRALAX) 17 GM/Dose powder     potassium chloride ER (KLOR-CON M) 20 MEQ CR tablet     rivaroxaban ANTICOAGULANT (XARELTO ANTICOAGULANT) 20 MG TABS tablet     sennosides (SENOKOT) 8.6 MG tablet     temozolomide (TEMODAR) 100 MG capsule     No current facility-administered medications for this visit.     Current Outpatient Medications   Medication Sig Dispense Refill     acetaminophen (TYLENOL) 325 MG tablet Take 650 mg by mouth every 4 hours as needed for mild pain        albuterol (PROAIR HFA/PROVENTIL HFA/VENTOLIN HFA) 108 (90 Base) MCG/ACT inhaler Inhale 2 puffs into the lungs every 4 hours as needed for wheezing       albuterol (PROVENTIL) (2.5 MG/3ML) 0.083% neb solution Take 1 vial (2.5 mg) by nebulization every 4 hours as needed for wheezing or shortness of breath / dyspnea       amLODIPine (NORVASC) 5 MG tablet Take 1 tablet (5 mg) by mouth daily       busPIRone HCl (BUSPAR) 30 MG tablet Take 30 mg by mouth 2 times daily       calcium polycarbophil (FIBERCON) 625 MG tablet Take 1 tablet (625 mg) by mouth daily       docusate sodium (COLACE) 100 MG capsule Take 1 capsule (100 mg) by mouth 2 times daily as needed for constipation       FLUoxetine (PROZAC) 20 MG capsule Take 3 capsules (60 mg) by mouth daily 30 capsule 0     furosemide (LASIX) 40 MG tablet Take 1 tablet (40 mg) by  mouth daily 30 tablet 0     levETIRAcetam (KEPPRA) 250 MG tablet Take 5 tablets (1,250 mg) by mouth 2 times daily 60 tablet 0     melatonin 3 MG tablet Take 2 tablets (6 mg) by mouth nightly as needed for sleep 30 tablet 0     ondansetron (ZOFRAN) 4 MG tablet Take 1 tablet (4 mg) by mouth every 8 hours as needed for nausea 30 tablet 3     pantoprazole (PROTONIX) 40 MG EC tablet Take 1 tablet (40 mg) by mouth 2 times daily (before meals) 30 tablet 0     polyethylene glycol (MIRALAX) 17 GM/Dose powder Take 17 g by mouth daily as needed for constipation 510 g 0     potassium chloride ER (KLOR-CON M) 20 MEQ CR tablet Take 2 tablets (40 mEq) by mouth daily       rivaroxaban ANTICOAGULANT (XARELTO ANTICOAGULANT) 20 MG TABS tablet Take 1 tablet (20 mg) by mouth daily (with dinner) 30 tablet 3     sennosides (SENOKOT) 8.6 MG tablet Take 1 tablet by mouth 2 times daily as needed for constipation 30 tablet 0     temozolomide (TEMODAR) 100 MG capsule Take 1 capsule (100 mg) by mouth daily for 28 days Take ondansetron 30-60 min before temozolomide. Take at bedtime on an empty stomach. 28 capsule 0     DRUG ALLERGIES   Allergies   Allergen Reactions     Bacitracin Rash     Bactroban is effective; No difficulties     Erythromycin      intolerant.     Meperidine Hcl      intolerant only   Demerol     Chloraprep One Step Rash       IMMUNIZATIONS   Immunization History   Administered Date(s) Administered     COVID-19,PF,Moderna 03/18/2021     Flu 65+ Years 09/28/2020     HepB 01/01/1990     Influenza (IIV3) PF 01/01/2001     Influenza, Quad, High Dose, Pf, 65yr+ (Fluzone HD) 09/28/2020     Pneumococcal 23 valent 07/22/2020     Tetanus 06/13/2004     Zoster vaccine recombinant adjuvanted (SHINGRIX) 12/24/2019, 10/12/2020       ONCOLOGIC HISTORY  -2/2021 PRESENTATION: Progressive aphasia.   -2/19/2021 MR brain imaging with 3.3 x 2.8 x 2.8 cm enhancing mass in left frontal-parietal region. A second contrast enhancing lesion (0.5 x 1.0  x 1.0 cm) in right occipital lobe which is dural based and largely stable in size since 9/2011; likely representing a meningioma.  -2/23/2021 SURGERY: Gross total resection by Dr. Cummings  PATHOLOGY: Glioblastoma; IDH1-R132H wild-type/ IDH 1 and 2 wildtype, MGMT promoter methylated. Not BRAF mutated.   -3/23/2021 NEURO-ONC: Recommending chemoradiotherapy.   -3/2021 ADMISSION: SOB and chest pain; CT PA negative, but bilateral DVT noted on U/S. Started on Lovenox. Acute hypoxic respiratory failure in the setting of bilateral pneumonia; presumed PJP, concern for transfusion-related acute lung injury and right hemidiaphragm paralysis. Seizure. Right retroperitoneal hematoma. Ileus. Non-severe malnutrition on TPN with concern for refeeding syndrome. 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 Shock Liver.  -5/4 - 5/27/2021 RADS: 15 fractions.   -5/25/2021 NEURO-ONC/ DEVICE: Discussed the role of Optune; to be considered. Repeat MRI in 4 weeks with plans to start adjuvant temozolomide.   -6/22/2021 NEURO-ONC/ MRB/ CHEMO: Clinically improving. Imaging largely stable. Starting adjuvant temozolomide 150mg/m2 (250mg), cycle 1 (start date 6/24).   -7/20/2021 NEURO-ONC/ CHEMO: Clinically improving. Thrombocytopenia noted with adjuvant temozolomide dosing, platelets of 26K; will transition to metronomic dosing 50mg/m2 (100mg) daily to start once platelets are > 80K.    -8/17/2021 NEURO-ONC/ MRB/ CHEMO: Clinically improving. Saw Dr. Gamboa, who recommended starting anticoagulation; on Eliquis. Imaging with positive treatment response. Continue metronomic/ daily dosing at 50mg/m2 (100mg) through 12/2021.    -9/14/2021 NEURO-ONC/ CHEMO: Clinically improving. Continue metronomic/ daily dosing at 50mg/m2 (100mg) through 12/2021.    -10/12/2021 NEURO-ONC/ CHEMO: Clinically improving. Holding temozolomide and Zofran in the setting of severe constipation. Abdominal x-ray with high stool burden; consulted Dr. Hodge for management of constipation  "given history of ileus.     SOCIAL HISTORY   History   Smoking Status     Never Smoker   Smokeless Tobacco     Never Used    Social History    Substance and Sexual Activity      Alcohol use: Yes        Comment: very rare     History   Drug Use No       PHYSICAL EXAMINATION  /81   Pulse 93   Temp 97.8  F (36.6  C) (Oral)   Resp 18   Wt 68.2 kg (150 lb 6.4 oz)   SpO2 95%   BMI 26.64 kg/m    Wt Readings from Last 2 Encounters:   10/12/21 68.2 kg (150 lb 6.4 oz)   10/12/21 68.2 kg (150 lb 6.4 oz)      Ht Readings from Last 2 Encounters:   09/10/21 1.6 m (5' 3\")   08/17/21 1.6 m (5' 2.99\")     KPS: 60-70    -Better energy today. Ambulated into clinic with walker for support, previously using a wheelchair.  -Respiratory: No increased work of breathing.  -Abd: Distended, non-tender, no rebound.  -Skin: No facial rashes. Bruising on arms.   -Psychiatric: Normal mood and affect. Pleasant, talkative.  -Neurologic:   MENTAL STATUS:     Alert, oriented.    Speech largely fluent. Naming 3/3 intact.   Comprehension intact to all commands.     CRANIAL NERVES:     Pupils are equal, round.     Extraocular movements full, denies diplopia.     Visual fields full.     Facial sensation intact to light touch.   No facial droop.   Hearing slightly decreased bilaterally.   Normal phonation.   MOTOR: Mild drift in right arm only (baseline on right; related to shoulder).  SENSATION: Intact to light touch throughout.  COORDINATION: Intact finger-nose with eyes open and closed bilaterally.  GAIT: Walked with a walker, steady, slow speed.       MEDICAL RECORDS  Obtained and personally reviewed all available outside medical records in addition to reviewing any records available in our electronic system.     LABS  Personally reviewed all available lab results; CBC and CMP.     IMAGING  No new neuro-imaging to review.      Personally reviewed abdominal x-ray from today;  IVC filter. Ingested pills in the bowel. Bowel gas pattern is " nonobstructed. There is a large amount of stool in the colon consistent with history. No free intraperitoneal air.      IMPRESSION  Clinic time for this high complexity encounter was spent discussing in detail the nature of her cancer, providing emotional support, answering questions pertaining to my recommendations, and devising the plan as outlined below.     Clinically, I remain overall pleased with how well Olga is improving. She has graduated rehab therapies and is now more stable with walking with the use of a walker. She walked into clinic for the first time today! She has not experienced any recent falls.     She is off dexamethasone and with her absolute lymphocyte count > 0.5, there is no indication for the continuation of Bactrim.     Unfortunately, she does have several medical complaints today; Her energy is low. She was previously using a CPAP, but has not been using it lately. I would recommend resuming her CPAP as a means to improve nighttime sleep quality and reduce daytime fatigue/ somnolence. Olga is on anticoagulation with no signs/ symptoms of concerning bleeding. Recent CBC reviewed; no anemia noted (Hb 10.9). TSH was normal on 10/5. Olga is noting bilateral leg swelling, however, with her on anticoagulation and spending much time sitting during the day, the etiology is likely dependent edema, which will improve with her becoming more active and elevating her feet when sitting. We also discussed using compression stockings at home, she will think about it.     While metronomic-dosed temozolomide remains well tolerated with no significant lab abnormalities, she has been experiencing significant nausea, anorexia, and constipation. Will recommend 2 week hold from taking chemotherapy and zofran to address constipation given past GI history of an ileus. Reached out to her GI provider, Dr. Hodge, to discuss best options for bowel regimen. Obtain abdominal X-ray today to assess stool  burden and ensure no air/fluid level; results should high stool burden.     PROBLEM LIST  Glioblastoma  Aphasia  Apraxia    PLAN  -CANCER DIRECTED THERAPY-  -Will hold daily (metronomic) dosing of temozolomide at 50mg/m2 (100mg/ day) for at least 2 weeks.   Plan remains to complete 6 month course of adjuvant chemotherapy.   -Supportive medications; will hold Zofran for 2 weeks due to constipation/stool burden. Will recommend bowel regimen after touching base with GI provider; Dr. Hodge. Abdominal Xray preformed today.   -Stopped Bactrim while off steroids and ALC is > 0.5.     -Continue lab monitoring; CBC every 2 weeks and CMP monthly.   -Repeat imaging next month.    -STEROIDS-  -Off dexamethasone.    -SEIZURE MANAGEMENT-  -Given history of seizures, antiepileptics are indicated.   -Continue Keppra.     -DVT-  -Following with Dr. Gamboa; currently on Eliquis.   -Requires lifelong anticoagulation given cancer diagnosis.   -Will need to consider removal of the IVC filter.     -Quality of life/ MOOD/ FATIGUE-  -Denies any mood issues.  -Continue to monitor mood as untreated/ undertreated depression can worsen fatigue, dysorexia, and quality of life.  -Recommend using CPAP for MARCELLE, as this could help with daytime fatigue.    -REHAB NEEDS-  -Completed PT/ OT.   -Consideration for referral to Dr. Agudelo with cancer rehab.     Return to clinic in 1 month + imaging + labs.     In the meantime, Olga and La Nena know to call with questions or concerns or to report new complaints and can be seen sooner if needed.     Patient seen today in combination with neurology resident (Emilia Delarosa MD, PGY3).    Stephanie Baker MD  Neuro-oncology

## 2021-10-12 ENCOUNTER — HOSPITAL ENCOUNTER (OUTPATIENT)
Dept: GENERAL RADIOLOGY | Facility: CLINIC | Age: 76
Discharge: HOME | End: 2021-10-12
Attending: PSYCHIATRY & NEUROLOGY | Admitting: PSYCHIATRY & NEUROLOGY
Payer: MEDICARE

## 2021-10-12 ENCOUNTER — ONCOLOGY VISIT (OUTPATIENT)
Dept: ONCOLOGY | Facility: CLINIC | Age: 76
End: 2021-10-12
Attending: PSYCHIATRY & NEUROLOGY
Payer: MEDICARE

## 2021-10-12 ENCOUNTER — PATIENT OUTREACH (OUTPATIENT)
Dept: ONCOLOGY | Facility: CLINIC | Age: 76
End: 2021-10-12

## 2021-10-12 VITALS
TEMPERATURE: 97.8 F | DIASTOLIC BLOOD PRESSURE: 81 MMHG | BODY MASS INDEX: 26.64 KG/M2 | RESPIRATION RATE: 18 BRPM | HEART RATE: 93 BPM | SYSTOLIC BLOOD PRESSURE: 120 MMHG | OXYGEN SATURATION: 95 % | WEIGHT: 150.4 LBS

## 2021-10-12 VITALS
TEMPERATURE: 97.8 F | DIASTOLIC BLOOD PRESSURE: 81 MMHG | HEART RATE: 93 BPM | WEIGHT: 150.4 LBS | SYSTOLIC BLOOD PRESSURE: 120 MMHG | OXYGEN SATURATION: 95 % | RESPIRATION RATE: 18 BRPM | BODY MASS INDEX: 26.64 KG/M2

## 2021-10-12 DIAGNOSIS — K59.03 DRUG-INDUCED CONSTIPATION: ICD-10-CM

## 2021-10-12 DIAGNOSIS — C71.9 GLIOBLASTOMA (H): ICD-10-CM

## 2021-10-12 DIAGNOSIS — C71.9 GLIOBLASTOMA (H): Primary | ICD-10-CM

## 2021-10-12 DIAGNOSIS — C71.9 GBM (GLIOBLASTOMA MULTIFORME) (H): Primary | ICD-10-CM

## 2021-10-12 PROCEDURE — 99215 OFFICE O/P EST HI 40 MIN: CPT | Performed by: PSYCHIATRY & NEUROLOGY

## 2021-10-12 PROCEDURE — 99214 OFFICE O/P EST MOD 30 MIN: CPT | Performed by: INTERNAL MEDICINE

## 2021-10-12 PROCEDURE — 74019 RADEX ABDOMEN 2 VIEWS: CPT

## 2021-10-12 ASSESSMENT — PAIN SCALES - GENERAL
PAINLEVEL: NO PAIN (0)
PAINLEVEL: NO PAIN (0)

## 2021-10-12 NOTE — PROGRESS NOTES
"Oncology Rooming Note    October 12, 2021 11:02 AM   Olga Bailey is a 76 year old female who presents for:    Chief Complaint   Patient presents with     Oncology Clinic Visit     Initial Vitals: /81   Pulse 93   Temp 97.8  F (36.6  C) (Oral)   Resp 18   Wt 68.2 kg (150 lb 6.4 oz)   SpO2 95%   BMI 26.64 kg/m   Estimated body mass index is 26.64 kg/m  as calculated from the following:    Height as of 9/10/21: 1.6 m (5' 3\").    Weight as of this encounter: 68.2 kg (150 lb 6.4 oz). Body surface area is 1.74 meters squared.  No Pain (0) Comment: Data Unavailable   No LMP recorded. Patient has had a hysterectomy.  Allergies reviewed: Yes  Medications reviewed: Yes    Medications: Medication refills not needed today.  Pharmacy name entered into [facility]:    [id number] - [place], [place] - [address] AT [facility] [id number]  [facility] - [place], [place] - [address] [id number] - [place], [place] - [address] [facility]    Clinical concerns: Edema  BK was notified.      Shari J. Schoenberger, CMA            "

## 2021-10-12 NOTE — PATIENT INSTRUCTIONS
1. Continue xarelto.  2. Patient will decide regarding having IVC filter removed.  3. Follow-up with Dr. Baker.

## 2021-10-12 NOTE — LETTER
"    10/12/2021         RE: Olga Bailey  [address] [facility] [address]        Dear Colleague,    Thank you for referring your patient, Olga Bailey, to the [facility] CANCER [facility]. Please see a copy of my visit note below.    NEURO-ONCOLOGY VISIT  Oct 12, 2021    CHIEF COMPLAINT: Ms. Olga Bailey is a 76 year old right-handed woman with a left frontoparietal glioblastoma (IDH wild-type, MGMT promoter methylated), diagnosed following gross total resection on 2/23/2021. Initiation of upfront cancer-directed treatment was delayed due to a complicated hospitalization. Given a resolving infection and lowered functional status, radiation alone was initiated on 5/4/2021 and completed on 5/27/2021.     Olga started adjuvant therapy with 150mg/m2 dosing of temozolomide, however, cycle 1 was complicated by significant hematologic toxicity. Dosing was changed to metronomic/ daily dosing in 7/2021 and this was well tolerated. Imaging from 8/2021 demonstrates positive treatment effect.    Chemotherapy was placed on a hold in 10/2021 in the setting of severe constipation.    Olga is presenting in follow-up accompanied by her daughter, La Nena.    HISTORY OF PRESENT ILLNESS  -Olga is doing poorly today.  -She is noting fatigue; \"I could sleep all day.\" Sleeping well at night, however, has not resumed use of her CPAP yet.   -Mood is good.   -Overall, tolerating daily-dosed temozolomide well.   -Notable nausea, using Zofran daily without significant relief.   -Endorsing constipation and mild abdominal pain. Noting significant anorexia; eating and drinking is decreased, low appetite.   -She can walk on her own with a walker. Graduated PT/OT/SLP. No recent falls. Notes stable leg swelling.   -Mental status is stable. Needing assistance with complex decision making.  -No headaches recently.  -Dyspnea on exertion with walking, no chest pain. Feeling deconditioned.   -Denies changes in " sensation.  -Off dexamethasone.   -No recurrent seizures since her last visit, tolerating Keppra.  -On anticoagulation with no significant signs/ symptoms of bleeding.    REVIEW OF SYSTEMS  A comprehensive ROS negative except as in HPI.    MEDICATIONS   Current Outpatient Medications   Medication     acetaminophen (TYLENOL) 325 MG tablet     albuterol (PROAIR HFA/PROVENTIL HFA/VENTOLIN HFA) 108 (90 Base) MCG/ACT inhaler     albuterol (PROVENTIL) (2.5 MG/3ML) 0.083% neb solution     amLODIPine (NORVASC) 5 MG tablet     busPIRone HCl (BUSPAR) 30 MG tablet     calcium polycarbophil (FIBERCON) 625 MG tablet     docusate sodium (COLACE) 100 MG capsule     FLUoxetine (PROZAC) 20 MG capsule     furosemide (LASIX) 40 MG tablet     levETIRAcetam (KEPPRA) 250 MG tablet     melatonin 3 MG tablet     ondansetron (ZOFRAN) 4 MG tablet     pantoprazole (PROTONIX) 40 MG EC tablet     polyethylene glycol (MIRALAX) 17 GM/Dose powder     potassium chloride ER (KLOR-CON M) 20 MEQ CR tablet     rivaroxaban ANTICOAGULANT (XARELTO ANTICOAGULANT) 20 MG TABS tablet     sennosides (SENOKOT) 8.6 MG tablet     temozolomide (TEMODAR) 100 MG capsule     No current facility-administered medications for this visit.     Current Outpatient Medications   Medication Sig Dispense Refill     acetaminophen (TYLENOL) 325 MG tablet Take 650 mg by mouth every 4 hours as needed for mild pain        albuterol (PROAIR HFA/PROVENTIL HFA/VENTOLIN HFA) 108 (90 Base) MCG/ACT inhaler Inhale 2 puffs into the lungs every 4 hours as needed for wheezing       albuterol (PROVENTIL) (2.5 MG/3ML) 0.083% neb solution Take 1 vial (2.5 mg) by nebulization every 4 hours as needed for wheezing or shortness of breath / dyspnea       amLODIPine (NORVASC) 5 MG tablet Take 1 tablet (5 mg) by mouth daily       busPIRone HCl (BUSPAR) 30 MG tablet Take 30 mg by mouth 2 times daily       calcium polycarbophil (FIBERCON) 625 MG tablet Take 1 tablet (625 mg) by mouth daily        docusate sodium (COLACE) 100 MG capsule Take 1 capsule (100 mg) by mouth 2 times daily as needed for constipation       FLUoxetine (PROZAC) 20 MG capsule Take 3 capsules (60 mg) by mouth daily 30 capsule 0     furosemide (LASIX) 40 MG tablet Take 1 tablet (40 mg) by mouth daily 30 tablet 0     levETIRAcetam (KEPPRA) 250 MG tablet Take 5 tablets (1,250 mg) by mouth 2 times daily 60 tablet 0     melatonin 3 MG tablet Take 2 tablets (6 mg) by mouth nightly as needed for sleep 30 tablet 0     ondansetron (ZOFRAN) 4 MG tablet Take 1 tablet (4 mg) by mouth every 8 hours as needed for nausea 30 tablet 3     pantoprazole (PROTONIX) 40 MG EC tablet Take 1 tablet (40 mg) by mouth 2 times daily (before meals) 30 tablet 0     polyethylene glycol (MIRALAX) 17 GM/Dose powder Take 17 g by mouth daily as needed for constipation 510 g 0     potassium chloride ER (KLOR-CON M) 20 MEQ CR tablet Take 2 tablets (40 mEq) by mouth daily       rivaroxaban ANTICOAGULANT (XARELTO ANTICOAGULANT) 20 MG TABS tablet Take 1 tablet (20 mg) by mouth daily (with dinner) 30 tablet 3     sennosides (SENOKOT) 8.6 MG tablet Take 1 tablet by mouth 2 times daily as needed for constipation 30 tablet 0     temozolomide (TEMODAR) 100 MG capsule Take 1 capsule (100 mg) by mouth daily for 28 days Take ondansetron 30-60 min before temozolomide. Take at bedtime on an empty stomach. 28 capsule 0     DRUG ALLERGIES   Allergies   Allergen Reactions     Bacitracin Rash     Bactroban is effective; No difficulties     Erythromycin      intolerant.     Meperidine Hcl      intolerant only   Demerol     Chloraprep One Step Rash       IMMUNIZATIONS   Immunization History   Administered Date(s) Administered     COVID-19,PF,Moderna 03/18/2021     Flu 65+ Years 09/28/2020     HepB 01/01/1990     Influenza (IIV3) PF 01/01/2001     Influenza, Quad, High Dose, Pf, 65yr+ (Fluzone HD) 09/28/2020     Pneumococcal 23 valent 07/22/2020     Tetanus 06/13/2004     Zoster vaccine  recombinant adjuvanted (SHINGRIX) 12/24/2019, 10/12/2020       ONCOLOGIC HISTORY  -2/2021 PRESENTATION: Progressive aphasia.   -2/19/2021 MR brain imaging with 3.3 x 2.8 x 2.8 cm enhancing mass in left frontal-parietal region. A second contrast enhancing lesion (0.5 x 1.0 x 1.0 cm) in right occipital lobe which is dural based and largely stable in size since 9/2011; likely representing a meningioma.  -2/23/2021 SURGERY: Gross total resection by Dr. Cummings  PATHOLOGY: Glioblastoma; IDH1-R132H wild-type/ IDH 1 and 2 wildtype, MGMT promoter methylated. Not BRAF mutated.   -3/23/2021 NEURO-ONC: Recommending chemoradiotherapy.   -3/2021 ADMISSION: SOB and chest pain; CT PA negative, but bilateral DVT noted on U/S. Started on Lovenox. Acute hypoxic respiratory failure in the setting of bilateral pneumonia; presumed PJP, concern for transfusion-related acute lung injury and right hemidiaphragm paralysis. Seizure. Right retroperitoneal hematoma. Ileus. Non-severe malnutrition on TPN with concern for refeeding syndrome. 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 Shock Liver.  -5/4 - 5/27/2021 RADS: 15 fractions.   -5/25/2021 NEURO-ONC/ DEVICE: Discussed the role of Optune; to be considered. Repeat MRI in 4 weeks with plans to start adjuvant temozolomide.   -6/22/2021 NEURO-ONC/ MRB/ CHEMO: Clinically improving. Imaging largely stable. Starting adjuvant temozolomide 150mg/m2 (250mg), cycle 1 (start date 6/24).   -7/20/2021 NEURO-ONC/ CHEMO: Clinically improving. Thrombocytopenia noted with adjuvant temozolomide dosing, platelets of 26K; will transition to metronomic dosing 50mg/m2 (100mg) daily to start once platelets are > 80K.    -8/17/2021 NEURO-ONC/ MRB/ CHEMO: Clinically improving. Saw Dr. Gamboa, who recommended starting anticoagulation; on Eliquis. Imaging with positive treatment response. Continue metronomic/ daily dosing at 50mg/m2 (100mg) through 12/2021.    -9/14/2021 NEURO-ONC/ CHEMO: Clinically improving. Continue  "metronomic/ daily dosing at 50mg/m2 (100mg) through 12/2021.    -10/12/2021 NEURO-ONC/ CHEMO: Clinically improving. Holding temozolomide and Zofran in the setting of severe constipation. Abdominal x-ray with high stool burden; consulted Dr. Hodge for management of constipation given history of ileus.     SOCIAL HISTORY   History   Smoking Status     Never Smoker   Smokeless Tobacco     Never Used    Social History    Substance and Sexual Activity      Alcohol use: Yes        Comment: very rare     History   Drug Use No       PHYSICAL EXAMINATION  /81   Pulse 93   Temp 97.8  F (36.6  C) (Oral)   Resp 18   Wt 68.2 kg (150 lb 6.4 oz)   SpO2 95%   BMI 26.64 kg/m    Wt Readings from Last 2 Encounters:   10/12/21 68.2 kg (150 lb 6.4 oz)   10/12/21 68.2 kg (150 lb 6.4 oz)      Ht Readings from Last 2 Encounters:   09/10/21 1.6 m (5' 3\")   08/17/21 1.6 m (5' 2.99\")     KPS: 60-70    -Better energy today. Ambulated into clinic with walker for support, previously using a wheelchair.  -Respiratory: No increased work of breathing.  -Abd: Distended, non-tender, no rebound.  -Skin: No facial rashes. Bruising on arms.   -Psychiatric: Normal mood and affect. Pleasant, talkative.  -Neurologic:   MENTAL STATUS:     Alert, oriented.    Speech largely fluent. Naming 3/3 intact.   Comprehension intact to all commands.     CRANIAL NERVES:     Pupils are equal, round.     Extraocular movements full, denies diplopia.     Visual fields full.     Facial sensation intact to light touch.   No facial droop.   Hearing slightly decreased bilaterally.   Normal phonation.   MOTOR: Mild drift in right arm only (baseline on right; related to shoulder).  SENSATION: Intact to light touch throughout.  COORDINATION: Intact finger-nose with eyes open and closed bilaterally.  GAIT: Walked with a walker, steady, slow speed.       MEDICAL RECORDS  Obtained and personally reviewed all available outside medical records in addition to reviewing any " records available in our electronic system.     LABS  Personally reviewed all available lab results; CBC and CMP.     IMAGING  No new neuro-imaging to review.      Personally reviewed abdominal x-ray from today;  IVC filter. Ingested pills in the bowel. Bowel gas pattern is nonobstructed. There is a large amount of stool in the colon consistent with history. No free intraperitoneal air.      IMPRESSION  Clinic time for this high complexity encounter was spent discussing in detail the nature of her cancer, providing emotional support, answering questions pertaining to my recommendations, and devising the plan as outlined below.     Clinically, I remain overall pleased with how well Olga is improving. She has graduated rehab therapies and is now more stable with walking with the use of a walker. She walked into clinic for the first time today! She has not experienced any recent falls.     She is off dexamethasone and with her absolute lymphocyte count > 0.5, there is no indication for the continuation of Bactrim.     Unfortunately, she does have several medical complaints today; Her energy is low. She was previously using a CPAP, but has not been using it lately. I would recommend resuming her CPAP as a means to improve nighttime sleep quality and reduce daytime fatigue/ somnolence. Olga is on anticoagulation with no signs/ symptoms of concerning bleeding. Recent CBC reviewed; no anemia noted (Hb 10.9). TSH was normal on 10/5. Olga is noting bilateral leg swelling, however, with her on anticoagulation and spending much time sitting during the day, the etiology is likely dependent edema, which will improve with her becoming more active and elevating her feet when sitting. We also discussed using compression stockings at home, she will think about it.     While metronomic-dosed temozolomide remains well tolerated with no significant lab abnormalities, she has been experiencing significant nausea, anorexia,  and constipation. Will recommend 2 week hold from taking chemotherapy and zofran to address constipation given past GI history of an ileus. Reached out to her GI provider, Dr. Hodge, to discuss best options for bowel regimen. Obtain abdominal X-ray today to assess stool burden and ensure no air/fluid level; results should high stool burden.     PROBLEM LIST  Glioblastoma  Aphasia  Apraxia    PLAN  -CANCER DIRECTED THERAPY-  -Will hold daily (metronomic) dosing of temozolomide at 50mg/m2 (100mg/ day) for at least 2 weeks.   Plan remains to complete 6 month course of adjuvant chemotherapy.   -Supportive medications; will hold Zofran for 2 weeks due to constipation/stool burden. Will recommend bowel regimen after touching base with GI provider; Dr. Hodge. Abdominal Xray preformed today.   -Stopped Bactrim while off steroids and ALC is > 0.5.     -Continue lab monitoring; CBC every 2 weeks and CMP monthly.   -Repeat imaging next month.    -STEROIDS-  -Off dexamethasone.    -SEIZURE MANAGEMENT-  -Given history of seizures, antiepileptics are indicated.   -Continue Keppra.     -DVT-  -Following with Dr. Gamboa; currently on Eliquis.   -Requires lifelong anticoagulation given cancer diagnosis.   -Will need to consider removal of the IVC filter.     -Quality of life/ MOOD/ FATIGUE-  -Denies any mood issues.  -Continue to monitor mood as untreated/ undertreated depression can worsen fatigue, dysorexia, and quality of life.  -Recommend using CPAP for MARCELLE, as this could help with daytime fatigue.    -REHAB NEEDS-  -Completed PT/ OT.   -Consideration for referral to Dr. Agudelo with cancer rehab.     Return to clinic in 1 month + imaging + labs.     In the meantime, Olga and La Nena know to call with questions or concerns or to report new complaints and can be seen sooner if needed.     Patient seen today in combination with neurology resident (Emilia Delarosa MD, PGY3).    Stephanie Baker MD  Neuro-oncology      Oncology  "Rooming Note    October 12, 2021 10:07 AM   Olga Bailey is a 76 year old female who presents for:    Chief Complaint   Patient presents with     Oncology Clinic Visit     GBM (glioblastoma multiforme) (H)     Initial Vitals: /81   Pulse 93   Temp 97.8  F (36.6  C) (Oral)   Resp 18   Wt 68.2 kg (150 lb 6.4 oz)   SpO2 95%   BMI 26.64 kg/m   Estimated body mass index is 26.64 kg/m  as calculated from the following:    Height as of 9/10/21: 1.6 m (5' 3\").    Weight as of this encounter: 68.2 kg (150 lb 6.4 oz). Body surface area is 1.74 meters squared.  No Pain (0) Comment: Data Unavailable   No LMP recorded. Patient has had a hysterectomy.  Allergies reviewed: Yes  Medications reviewed: Yes    Medications: Medication refills not needed today.  Pharmacy name entered into [employer]:    [id number] - [place], [place] - [address] AT [facility] [address]  [facility] - [place], [place] - [address] [facility] ONLY #[id number] - [place], [place] - [address] [facility]    Clinical concerns: no      Paula Turner CMA                  Again, thank you for allowing me to participate in the care of your patient.        Sincerely,        Stephanie Baker MD    "

## 2021-10-12 NOTE — PROGRESS NOTES
"Oncology Rooming Note    October 12, 2021 10:07 AM   Olga Bailey is a 76 year old female who presents for:    Chief Complaint   Patient presents with     Oncology Clinic Visit     GBM (glioblastoma multiforme) (H)     Initial Vitals: /81   Pulse 93   Temp 97.8  F (36.6  C) (Oral)   Resp 18   Wt 68.2 kg (150 lb 6.4 oz)   SpO2 95%   BMI 26.64 kg/m   Estimated body mass index is 26.64 kg/m  as calculated from the following:    Height as of 9/10/21: 1.6 m (5' 3\").    Weight as of this encounter: 68.2 kg (150 lb 6.4 oz). Body surface area is 1.74 meters squared.  No Pain (0) Comment: Data Unavailable   No LMP recorded. Patient has had a hysterectomy.  Allergies reviewed: Yes  Medications reviewed: Yes    Medications: Medication refills not needed today.  Pharmacy name entered into [employer]:    [facility] #[id number] - [place], [place] - [address] AT [facility] [address]  [facility] - [place], [place] - [address]  [facility] ONLY #[id number] - [place], [place] - [address] [place]    Clinical concerns: no      Paula Turner CMA              "

## 2021-10-12 NOTE — PROGRESS NOTES
"Recommendations after visit today with Dr. Baker:   \"-Will hold daily (metronomic) dosing of temozolomide at 50mg/m2; 100mg/ day for 2 weeks.  -Supportive medications; will hold Zofran for 2 weeks due to constipation/stool burden. Will recommend bowel regimen after touching base with GI provider Dr. Hodge. Will also obtain an abdominal Xray today.\"    Contacted ELEANOR Grier at [facility] with recommendations to hold TMZ and Zofran for 2 weeks. Will follow-up pending Dr. Hodge's recommendations. Marvel reports understanding.     Marvel inquired re: holding Bactrim. Per chart review, no active Rx for Bactrim from Dr. Baker or on medication list. Marvel will look into this more and call with update.     Faxed signed orders from visit and clinic notes/medication list. Asked Marvel contact clinic if any additional medication questions. Advised to continue CBC w/ plt/diff every 2 weeks, CMP every 4 weeks. Requested labs 11/15 prior to 11/19 visit with Dr. Baker.     Louise Jerry, BSN, RN, PHN, OCN  Oncology Care Coordinator  [facility]      "

## 2021-10-12 NOTE — LETTER
"    10/12/2021         RE: Olga Bailey  [address] [facility] [address]        Dear Colleague,    Thank you for referring your patient, Olga Bailey, to the [facility] CANCER [facility]. Please see a copy of my visit note below.    Oncology Rooming Note    October 12, 2021 11:02 AM   Olga Bailey is a 76 year old female who presents for:    Chief Complaint   Patient presents with     Oncology Clinic Visit     Initial Vitals: /81   Pulse 93   Temp 97.8  F (36.6  C) (Oral)   Resp 18   Wt 68.2 kg (150 lb 6.4 oz)   SpO2 95%   BMI 26.64 kg/m   Estimated body mass index is 26.64 kg/m  as calculated from the following:    Height as of 9/10/21: 1.6 m (5' 3\").    Weight as of this encounter: 68.2 kg (150 lb 6.4 oz). Body surface area is 1.74 meters squared.  No Pain (0) Comment: Data Unavailable   No LMP recorded. Patient has had a hysterectomy.  Allergies reviewed: Yes  Medications reviewed: Yes    Medications: Medication refills not needed today.  Pharmacy name entered into [employer]:    [id number] - [place], [place] - [address] AT [facility] [place] [facility] [address]  [facility] - [place], [place] - [address] [facility] ONLY #[id number] - [place], [place] - [address] [facility]    Clinical concerns: Edema  BK was notified.      Shari J. Schoenberger, [facility]              SUBJECTIVE:  Ms. Dow is a 76-year-old female with a GBM.  The patient follows up with Dr. Baker.      Lower extremity ultrasound on 03/29/2021 revealed bilateral lower extremity DVT.  The patient was started on anticoagulation with heparin, which was transitioned to Lovenox.  The patient's hemoglobin decreased.  CT abdomen and pelvis on 04/14/2021 revealed large right retroperitoneal hematoma.  Anticoagulation was stopped.  She had an IVC filter placed in 04/2021. Later xarelto started. She is tolerating Xarelto well.  No bleeding complication.  She used to have " thrombocytopenia that has resolved.     The patient has some swelling in the leg.  It feels heavy.  No severe pain.  No redness.     PHYSICAL EXAMINATION:    GENERAL:  She is alert, oriented x 3.  Not in any distress.   Rest of systems not examined.     LABORATORY:  CBC and BMP reviewed.     ASSESSMENT:    1.  A 76-year-old female with bilateral lower extremity DVT secondary to malignancy.  She is on Xarelto.  2.  Thrombocytopenia, resolved.  3.  Mild anemia, stable.  4.  GBM.  5.  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     PLAN:    1.  The patient has malignancy induced DVT.  She needs to be on lifelong anticoagulation.  She is on Xarelto.  She is tolerating it well.  She will continue on it.    She has an IVC filter.  Discussed regarding having it removed.  The patient at this time is hesitant.  I told her IVC filter can be associated with complication. She is going to think about having IVC removed. When she decides, she will let us know and we will arrange for IVC filter removal.    3.  The patient advised to wear compression stockings.    4.  She will follow up with Dr. Baker.  I will see her on an as as-needed basis.     TOTAL FACE-TO-FACE TIME SPENT:  25 minutes, more than 50% of the time spent in counseling and coordination of care.    This office note has been dictated.          Again, thank you for allowing me to participate in the care of your patient.        Sincerely,        Rosenda Gamboa MD

## 2021-10-13 ENCOUNTER — LAB REQUISITION (OUTPATIENT)
Dept: LAB | Facility: CLINIC | Age: 76
End: 2021-10-13
Payer: MEDICARE

## 2021-10-13 DIAGNOSIS — D61.810 ANTINEOPLASTIC CHEMOTHERAPY INDUCED PANCYTOPENIA (CODE) (H): ICD-10-CM

## 2021-10-14 ENCOUNTER — TRANSFERRED RECORDS (OUTPATIENT)
Dept: HEALTH INFORMATION MANAGEMENT | Facility: CLINIC | Age: 76
End: 2021-10-14

## 2021-10-14 PROCEDURE — 36415 COLL VENOUS BLD VENIPUNCTURE: CPT | Mod: ORL | Performed by: PSYCHIATRY & NEUROLOGY

## 2021-10-14 PROCEDURE — 85025 COMPLETE CBC W/AUTO DIFF WBC: CPT | Mod: ORL | Performed by: PSYCHIATRY & NEUROLOGY

## 2021-10-14 PROCEDURE — P9603 ONE-WAY ALLOW PRORATED MILES: HCPCS | Mod: ORL | Performed by: PSYCHIATRY & NEUROLOGY

## 2021-10-15 ENCOUNTER — PATIENT OUTREACH (OUTPATIENT)
Dept: ONCOLOGY | Facility: CLINIC | Age: 76
End: 2021-10-15

## 2021-10-15 LAB
BASOPHILS # BLD AUTO: 0 10E3/UL (ref 0–0.2)
BASOPHILS NFR BLD AUTO: 1 %
EOSINOPHIL # BLD AUTO: 0.2 10E3/UL (ref 0–0.7)
EOSINOPHIL NFR BLD AUTO: 5 %
ERYTHROCYTE [DISTWIDTH] IN BLOOD BY AUTOMATED COUNT: 14.5 % (ref 10–15)
HCT VFR BLD AUTO: 35.1 % (ref 35–47)
HGB BLD-MCNC: 11.5 G/DL (ref 11.7–15.7)
IMM GRANULOCYTES # BLD: 0 10E3/UL
IMM GRANULOCYTES NFR BLD: 0 %
LYMPHOCYTES # BLD AUTO: 0.1 10E3/UL (ref 0.8–5.3)
LYMPHOCYTES NFR BLD AUTO: 3 %
MCH RBC QN AUTO: 32.1 PG (ref 26.5–33)
MCHC RBC AUTO-ENTMCNC: 32.8 G/DL (ref 31.5–36.5)
MCV RBC AUTO: 98 FL (ref 78–100)
MONOCYTES # BLD AUTO: 0.4 10E3/UL (ref 0–1.3)
MONOCYTES NFR BLD AUTO: 10 %
NEUTROPHILS # BLD AUTO: 3.3 10E3/UL (ref 1.6–8.3)
NEUTROPHILS NFR BLD AUTO: 81 %
NRBC # BLD AUTO: 0 10E3/UL
NRBC BLD AUTO-RTO: 0 /100
PLATELET # BLD AUTO: 198 10E3/UL (ref 150–450)
RBC # BLD AUTO: 3.58 10E6/UL (ref 3.8–5.2)
WBC # BLD AUTO: 4.1 10E3/UL (ref 4–11)

## 2021-10-15 NOTE — PROGRESS NOTES
Returned call to Jyoti at [facility] and confirmed she received faxed/signed orders. No further needs at this time.    Louise Jerry, JIANN, RN, PHN, OCN  Oncology Care Coordinator  [facility]

## 2021-10-15 NOTE — PROGRESS NOTES
Writer received a message from RN of [facility] requesting a call back regarding orders that need to be signed.     Return call at [phone] ext. [phone]    Message routed.    Elizabeth Ricci RN

## 2021-10-16 NOTE — PROGRESS NOTES
SUBJECTIVE:  Ms. Dow is a 76-year-old female with a GBM.  The patient follows up with Dr. Baker.      Lower extremity ultrasound on 03/29/2021 revealed bilateral lower extremity DVT.  The patient was started on anticoagulation with heparin, which was transitioned to Lovenox.  The patient's hemoglobin decreased.  CT abdomen and pelvis on 04/14/2021 revealed large right retroperitoneal hematoma.  Anticoagulation was stopped.  She had an IVC filter placed in 04/2021. Later xarelto started. She is tolerating Xarelto well.  No bleeding complication.  She used to have thrombocytopenia that has resolved.     The patient has some swelling in the leg.  It feels heavy.  No severe pain.  No redness.     PHYSICAL EXAMINATION:    GENERAL:  She is alert, oriented x 3.  Not in any distress.   Rest of systems not examined.     LABORATORY:  CBC and BMP reviewed.     ASSESSMENT:    1.  A 76-year-old female with bilateral lower extremity DVT secondary to malignancy.  She is on Xarelto.  2.  Thrombocytopenia, resolved.  3.  Mild anemia, stable.  4.  GBM.  5.  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     PLAN:    1.  The patient has malignancy induced DVT.  She needs to be on lifelong anticoagulation.  She is on Xarelto.  She is tolerating it well.  She will continue on it.    She has an IVC filter.  Discussed regarding having it removed.  The patient at this time is hesitant.  I told her IVC filter can be associated with complication. She is going to think about having IVC removed. When she decides, she will let us know and we will arrange for IVC filter removal.    3.  The patient advised to wear compression stockings.    4.  She will follow up with Dr. Baker.  I will see her on an as as-needed basis.     TOTAL FACE-TO-FACE TIME SPENT:  25 minutes, more than 50% of the time spent in counseling and coordination of care.

## 2021-10-18 NOTE — PROGRESS NOTES
--OUTPATIENT OCCUPATIONAL THERAPY DISCHARGE NOTE--       03/24/21 1200   Signing Clinician's Name / Credentials   Signing clinician's name / credentials ALESHA Wynn/joana   Session Number   Session Number 3/10 Medicare w [facility]   Authorization status cert required   Progress/Recertification   Recertification Due 06/14/21   Adult OT Eval Goals   OT Eval Goals (Adult) 1;2;3;4;5;6;7;8    OT Goal 1   Goal Identifier FACIT   Goal Description Pt will demonstrate independent recall and application of 3 energy conservation modifications/strategies, as applied to daily home routines, with improved scores on the FACIT by 5 points.   Target Date 06/14/21    OT Goal 2   Goal Identifier Testing Results   Goal Description Patient and family to verbalize understanding of  IADL screening, Cognitive Performance Test, and Cognitive Assessment of [place] results and identify 3 strategies to increase patient's safety and independence in the home and community setting.   Target Date 06/14/21    OT Goal 3   Goal Identifier Money Management   Goal Description Patient will demonstrate the ability to complete simple to moderately complex money management tasks with 90% accuracy for increased attention to detail and problem solving skills to resume finances at home and manage money in the community.   Target Date 06/14/21   OT Goal 4   Goal Identifier Visual Perception   Goal Description Pt to demonstrate improved visual perception as needed to don bra, lower body clothes, copying shapes/deisgn and represent time on clocks with 2/5 effort.   Target Date 06/14/21   OT Goal 5   Goal Identifier Meal Prep   Goal Description Patient to complete a new, simple to moderately complex meal prep task using visual recipes as needed, using stove and/or oven with modified independence, for increased safety and independence in the home.   Target Date 06/14/21    OT Goal 6   Goal Identifier Medication Set Up   Goal Description Patient will independently set up  "their medications, using a pillbox, on 2 separate occasions for increased accuracy and independence with managing medications.   Target Date 06/14/21   OT Goal 7   Goal Identifier STM   Goal Description Patient will demonstrate improved memory skills by completing short-term memory tasks in occupational therapy with 85-90% accuracy using compensatory strategies for increased safety and independence with ADL/IADLS (remembering to take medications, turn off the stove when done, etc.).   Target Date 06/14/21    OT Goal 8   Goal Identifier GMC/FMC//Pinch   Goal Description Patient will demonstrate improved  by 5# in B hands, pinch by 3# and motor planning skills (dysdiadokinesis x 20 cycles and 9 hole peg to 30 sec) as needed to support dressing, household management and needle work tool use.   Target Date 06/14/21   Subjective Report   Subjective Report Pt had a big day of appointments yesterday. feeling overcome with fatigue was unable to sleep at all last night.  Received news that she may have 18 months and is tearful today..  \"My daughter had to help me off of the toilet the other night, I just couldn't do it.\"  Pt has stopped coming to OT since this appointment, and no further information is known on her prgress towards goals.   Therapeutic Interventions   Therapeutic Interventions Self Care/Home Management   Self Care/Home Management   Self-Care/Home Mgmt/ADL, Compensatory, Meal Prep Minutes (05576) 40 Minutes   Skilled Intervention Oreintation, numeric problem solving,    Patient Response Pt very emotional today.   Treatment Detail Meal Prep-- OTR orients pt to OT kitchen environment, goals of simple meal prep observation ( for planning, pre-retreival and task completion, safety).  Pt able to make single egg, managing burner heat appropriately, staying low at first , then adding heat, turns over egg and manages hot surfaces/burner off and as she turns it on to plate.  Fatigues/nauseous after 7 min of " "standing, rates effort level at 7 /10.  OTR starts TEAM NOTES section of binder. noting she is safe for single pot items on stove.  To try items in/out of oven soon/to assure safe balance, strength needed for pie and casserole.  Energy conservation-- OTR educated pt in slowly progressing level of activity around the house as tolerated, while using EC principles to get back to baseline status. GENERAL energy conservation/work simplification principles as related to daily tasks (prioritize tasks ,look at the day and week ahead and try to do only one bigger activity/day, rest often, stop before getting too fatigued, don't rush, modify environment as able with good lighting and good temperature, use good body mechanics, ask for help, simplify tasks as able, incorporate stress management techniques, sit as able and/or alternate standing and sitting, etc.);handout issued with custom edits.  Pt educated in use of self rating fatigue level (can be  physical, mental, social fatigue), using 0 as no fatigue and 10 great fatigue, 5+/10 as highest working range and benefits of staying sub threshold.  Defining overdoing as the need to physically/mentally recover >  2 hours after a strenuous task/groups of tasks.  Pt encouraged to rest or change from heavy to light task as effort level is > 5/10.  Okay to push beyond 6-7/10 effort levels briefly,  but only for high need items and for 1 minutes or less.  OTR models use of scale, having pt rate level  for activities from today/day prior.Discussed signs of good recovery following exertion ( 20-30 min) and overdoing ( need for > 2 hours to recover).  OTR uses analogy of burners on a stove top (cognitive, visual, social, physical) and cautions patient not to overfill these burners, focusing on 1-2 at a time.  Encouraged to \"rotate burners\"(moving between physical effort/switching to cognitive effort/mail sorting as pt sits), to help pt better pace the stress being placed on brain " during recovery. Pt was planning on having lunch w friend after session, agress that having as take home meal may be more appropriate. HEP-- did not do the last two days of log pag   Progress Pt's ride got lost, 10 min late   Education   Learner Patient   Readiness Eager   Method Booklet/handout;Literature;Explanation;Demonstration   Response Demonstrates understanding   Education Notes 100 gird, speech to text for  handwriting pages   Plan   Homework memory log w day/date and speech to text, handwriting pages   Home program team notes in binder   Updates to plan of care 2x/wk x 12 wks   Plan for next session check oven reaching safety and advise family. Add toilet AE?  Started OT/PT/SLP/home log pages for updates ( add text to speech ok for daily pages), need clearnace for safe (I) bathing?, check daily log, reivew PBS/add hand flicks and update handwrting pages on Level II, visual math/clocks dot to dot, FM pages, counting change?  DO CAM, check visual field, dynavision. NPC, add Fabio string ex.  Try cooking task. REivew voice to text to aide spellling, (can she send family messages?*)some phone use tricks/alrams, reminders.   CAM , then memory compenstaion training with SPeed card game. Perception tasks/design copy, following directions.  build HEp for FMC/putty p 1-2.     Comments   Comments pt late for session. friend was lost.   Total Session Time   Timed Code Treatment Minutes 40   Total Treatment Time (sum of timed and untimed services) 40   AMBULATORY CLINICS ONLY-MEDICAL AND TREATMENT DIAGNOSIS   Medical Diagnosis Cognitive-deficits, s/p craniotomy, glioblastoma    OT Diagnosis decreased ADL/IADL     Thank you for this thoughtful referral! If you have any questions, please call me at [phone].  Nice to share in this patient's care with you.    Sincerely,   Sara Khalil, RYANR/joana

## 2021-10-27 ENCOUNTER — LAB REQUISITION (OUTPATIENT)
Dept: LAB | Facility: CLINIC | Age: 76
End: 2021-10-27
Payer: MEDICARE

## 2021-10-27 ENCOUNTER — PATIENT OUTREACH (OUTPATIENT)
Dept: ONCOLOGY | Facility: CLINIC | Age: 76
End: 2021-10-27

## 2021-10-27 DIAGNOSIS — C71.9 GLIOBLASTOMA (H): ICD-10-CM

## 2021-10-27 DIAGNOSIS — R11.2 CHEMOTHERAPY-INDUCED NAUSEA AND VOMITING: ICD-10-CM

## 2021-10-27 DIAGNOSIS — R11.2 NAUSEA WITH VOMITING, UNSPECIFIED: ICD-10-CM

## 2021-10-27 DIAGNOSIS — T45.1X5A CHEMOTHERAPY-INDUCED NAUSEA AND VOMITING: ICD-10-CM

## 2021-10-27 DIAGNOSIS — D61.810 ANTINEOPLASTIC CHEMOTHERAPY INDUCED PANCYTOPENIA (CODE) (H): ICD-10-CM

## 2021-10-27 NOTE — PROGRESS NOTES
Contacted La Nena for update on symptoms as Temozolomide/Zofran has been held for 2 weeks.     La Nena reports patient saw Dr. Hodge and was started on meds for constipation. Now having issues with diarrhea. Appetite has been good, no nausea. Fatigue is unchanged although she has not been using her CPAP yet which La Nena is still working on with her.     La Nena will be visiting Olga this afternoon and discuss her wishes for resuming Temozolomide. Will await update from them later today, then update care team.     Addendum: La Nena returned call. Reports Olga feels well and would like to resume chemotherapy. Paulino reports diarrhea has stopped and she is having regular BMs. Will route to Dr. Baker/pharmacy team to confirm ok to resume Temozolomide. Also to advise on resuming Zofran prior to TMZ or PRN.     Louise Jerry, BSN, RN, PHN, OCN  Oncology Care Coordinator  [facility]

## 2021-10-28 LAB
ALBUMIN SERPL-MCNC: 4 G/DL (ref 3.5–5)
ALP SERPL-CCNC: 89 U/L (ref 45–120)
ALT SERPL W P-5'-P-CCNC: <9 U/L (ref 0–45)
ANION GAP SERPL CALCULATED.3IONS-SCNC: 11 MMOL/L (ref 5–18)
AST SERPL W P-5'-P-CCNC: 13 U/L (ref 0–40)
BASOPHILS # BLD AUTO: 0 10E3/UL (ref 0–0.2)
BASOPHILS NFR BLD AUTO: 1 %
BILIRUB SERPL-MCNC: 0.5 MG/DL (ref 0–1)
BUN SERPL-MCNC: 10 MG/DL (ref 8–28)
CALCIUM SERPL-MCNC: 9.9 MG/DL (ref 8.5–10.5)
CHLORIDE BLD-SCNC: 104 MMOL/L (ref 98–107)
CO2 SERPL-SCNC: 25 MMOL/L (ref 22–31)
CREAT SERPL-MCNC: 0.94 MG/DL (ref 0.6–1.1)
EOSINOPHIL # BLD AUTO: 0.4 10E3/UL (ref 0–0.7)
EOSINOPHIL NFR BLD AUTO: 7 %
ERYTHROCYTE [DISTWIDTH] IN BLOOD BY AUTOMATED COUNT: 13.8 % (ref 10–15)
GFR SERPL CREATININE-BSD FRML MDRD: 59 ML/MIN/1.73M2
GLUCOSE BLD-MCNC: 102 MG/DL (ref 70–125)
HCT VFR BLD AUTO: 37.9 % (ref 35–47)
HGB BLD-MCNC: 12.6 G/DL (ref 11.7–15.7)
IMM GRANULOCYTES # BLD: 0 10E3/UL
IMM GRANULOCYTES NFR BLD: 0 %
LYMPHOCYTES # BLD AUTO: 0.3 10E3/UL (ref 0.8–5.3)
LYMPHOCYTES NFR BLD AUTO: 6 %
MCH RBC QN AUTO: 32.2 PG (ref 26.5–33)
MCHC RBC AUTO-ENTMCNC: 33.2 G/DL (ref 31.5–36.5)
MCV RBC AUTO: 97 FL (ref 78–100)
MONOCYTES # BLD AUTO: 0.6 10E3/UL (ref 0–1.3)
MONOCYTES NFR BLD AUTO: 11 %
NEUTROPHILS # BLD AUTO: 3.7 10E3/UL (ref 1.6–8.3)
NEUTROPHILS NFR BLD AUTO: 75 %
NRBC # BLD AUTO: 0 10E3/UL
NRBC BLD AUTO-RTO: 0 /100
PLATELET # BLD AUTO: 224 10E3/UL (ref 150–450)
POTASSIUM BLD-SCNC: 3.7 MMOL/L (ref 3.5–5)
PROT SERPL-MCNC: 6.5 G/DL (ref 6–8)
RBC # BLD AUTO: 3.91 10E6/UL (ref 3.8–5.2)
SODIUM SERPL-SCNC: 140 MMOL/L (ref 136–145)
WBC # BLD AUTO: 5 10E3/UL (ref 4–11)

## 2021-10-28 PROCEDURE — 80053 COMPREHEN METABOLIC PANEL: CPT | Mod: ORL | Performed by: PSYCHIATRY & NEUROLOGY

## 2021-10-28 PROCEDURE — 36415 COLL VENOUS BLD VENIPUNCTURE: CPT | Mod: ORL | Performed by: PSYCHIATRY & NEUROLOGY

## 2021-10-28 PROCEDURE — 85025 COMPLETE CBC W/AUTO DIFF WBC: CPT | Mod: ORL | Performed by: PSYCHIATRY & NEUROLOGY

## 2021-10-28 PROCEDURE — P9603 ONE-WAY ALLOW PRORATED MILES: HCPCS | Mod: ORL | Performed by: PSYCHIATRY & NEUROLOGY

## 2021-10-28 RX ORDER — TEMOZOLOMIDE 100 MG/1
50 CAPSULE ORAL DAILY
Qty: 28 CAPSULE | Refills: 0 | COMMUNITY
Start: 2021-10-28 | End: 2021-12-23

## 2021-10-28 RX ORDER — TEMOZOLOMIDE 100 MG/1
50 CAPSULE ORAL DAILY
Qty: 28 CAPSULE | Refills: 0 | Status: SHIPPED | OUTPATIENT
Start: 2021-10-28 | End: 2021-11-16

## 2021-10-28 RX ORDER — ONDANSETRON 4 MG/1
4 TABLET, FILM COATED ORAL EVERY 8 HOURS PRN
Qty: 30 TABLET | Refills: 3 | COMMUNITY
Start: 2021-10-28

## 2021-10-28 RX ORDER — TEMOZOLOMIDE 100 MG/1
50 CAPSULE ORAL DAILY
Qty: 28 CAPSULE | Refills: 0 | COMMUNITY
Start: 2021-10-28 | End: 2021-10-28

## 2021-10-28 NOTE — PROGRESS NOTES
"Per Dr. Baker,  \"If bowels are moving with no abdominal pain, ok to resume temozolomide.   No more nausea, correct? Better appetite?   I would have Zofran be used as needed. Continue the bowel regimen.   Thanks,   Stephanie Baker MD   Neuro-oncology   10/28/2021 \"           Writer spoke with ELEANOR Jimenes at [facility]. Confirmed patient is not having nausea, has a good appetite, and is ready to resume Temozolomide. Scripts sent to resume Temozolomide and Zofran PRN. Recommended continuing bowel regimen and calling clinic if new or worsening symptoms. Faxed orders/recommendations to [phone].    Update La Nena on plan and that they will resume TMZ this evening.     Louise Jerry, JIANN, RN, PHN, OCN  Oncology Care Coordinator  [facility]      "

## 2021-11-02 NOTE — PROGRESS NOTES
La Nena called with update that SNF may not have resumed Temozolomide 10/28 as recommended by Dr. Baker. They may have resumed 11/1 instead. She is working to confirm this with SNF nursing team and will update Dr. Baker as needed.     Louise Jerry, BSN, RN, PHN, OCN  Oncology Care Coordinator  [facility]

## 2021-11-05 ENCOUNTER — TELEPHONE (OUTPATIENT)
Dept: ONCOLOGY | Facility: CLINIC | Age: 76
End: 2021-11-05
Payer: MEDICARE

## 2021-11-05 DIAGNOSIS — T45.1X5A CHEMOTHERAPY-INDUCED NAUSEA AND VOMITING: Primary | ICD-10-CM

## 2021-11-05 DIAGNOSIS — R11.2 CHEMOTHERAPY-INDUCED NAUSEA AND VOMITING: Primary | ICD-10-CM

## 2021-11-05 RX ORDER — PROCHLORPERAZINE MALEATE 10 MG
TABLET ORAL
Qty: 30 TABLET | Refills: 1 | Status: SHIPPED | OUTPATIENT
Start: 2021-11-05 | End: 2022-02-27

## 2021-11-05 NOTE — TELEPHONE ENCOUNTER
Pts daughter, La Nena, calling to request Emend PO for nausea and vomiting. Zofran is not very helpful and she had severe constipation a couple of weeks ago. Alternatively, compazine 10mg would be fine as well.    Routed to Dr Samuel Wang,RN    3:00 PM  Compazine 10mg PO; 1 tab PO 30 minutes prior to nighttime chemotherapy.   #30, R1.     And have GI see patient at NH    Called to [facility]. LM for DON to call back for verbal compazine order and GI to see Olga.    LM for daughter La Nena with update on plan.    Compazine order called to [facility] in [place].  La Nena will have her mom see GI doctor at offsite office as [facility] does not have someone who has been coming in to see residents.    Need to fax order to [facility] once signed for their records.

## 2021-11-10 ENCOUNTER — LAB REQUISITION (OUTPATIENT)
Dept: LAB | Facility: CLINIC | Age: 76
End: 2021-11-10
Payer: MEDICARE

## 2021-11-10 DIAGNOSIS — D61.810 ANTINEOPLASTIC CHEMOTHERAPY INDUCED PANCYTOPENIA (CODE) (H): ICD-10-CM

## 2021-11-11 ENCOUNTER — LAB REQUISITION (OUTPATIENT)
Dept: LAB | Facility: CLINIC | Age: 76
End: 2021-11-11
Payer: MEDICARE

## 2021-11-11 DIAGNOSIS — T45.1X5A ADVERSE EFFECT OF ANTINEOPLASTIC AND IMMUNOSUPPRESSIVE DRUGS, INITIAL ENCOUNTER: ICD-10-CM

## 2021-11-11 DIAGNOSIS — R11.2 NAUSEA WITH VOMITING, UNSPECIFIED: ICD-10-CM

## 2021-11-11 LAB
BASOPHILS # BLD AUTO: 0 10E3/UL (ref 0–0.2)
BASOPHILS NFR BLD AUTO: 1 %
EOSINOPHIL # BLD AUTO: 0.4 10E3/UL (ref 0–0.7)
EOSINOPHIL NFR BLD AUTO: 7 %
ERYTHROCYTE [DISTWIDTH] IN BLOOD BY AUTOMATED COUNT: 13.4 % (ref 10–15)
HCT VFR BLD AUTO: 36.2 % (ref 35–47)
HGB BLD-MCNC: 11.9 G/DL (ref 11.7–15.7)
IMM GRANULOCYTES # BLD: 0 10E3/UL
IMM GRANULOCYTES NFR BLD: 0 %
LYMPHOCYTES # BLD AUTO: 0.2 10E3/UL (ref 0.8–5.3)
LYMPHOCYTES NFR BLD AUTO: 4 %
MCH RBC QN AUTO: 32.6 PG (ref 26.5–33)
MCHC RBC AUTO-ENTMCNC: 32.9 G/DL (ref 31.5–36.5)
MCV RBC AUTO: 99 FL (ref 78–100)
MONOCYTES # BLD AUTO: 0.7 10E3/UL (ref 0–1.3)
MONOCYTES NFR BLD AUTO: 11 %
NEUTROPHILS # BLD AUTO: 4.8 10E3/UL (ref 1.6–8.3)
NEUTROPHILS NFR BLD AUTO: 77 %
NRBC # BLD AUTO: 0 10E3/UL
NRBC BLD AUTO-RTO: 0 /100
PLATELET # BLD AUTO: 220 10E3/UL (ref 150–450)
RBC # BLD AUTO: 3.65 10E6/UL (ref 3.8–5.2)
WBC # BLD AUTO: 6.2 10E3/UL (ref 4–11)

## 2021-11-11 PROCEDURE — P9603 ONE-WAY ALLOW PRORATED MILES: HCPCS | Mod: ORL | Performed by: PSYCHIATRY & NEUROLOGY

## 2021-11-11 PROCEDURE — 36415 COLL VENOUS BLD VENIPUNCTURE: CPT | Mod: ORL | Performed by: PSYCHIATRY & NEUROLOGY

## 2021-11-11 PROCEDURE — 85025 COMPLETE CBC W/AUTO DIFF WBC: CPT | Mod: ORL | Performed by: PSYCHIATRY & NEUROLOGY

## 2021-11-12 ENCOUNTER — LAB REQUISITION (OUTPATIENT)
Dept: LAB | Facility: CLINIC | Age: 76
End: 2021-11-12
Payer: MEDICARE

## 2021-11-12 DIAGNOSIS — R10.84 GENERALIZED ABDOMINAL PAIN: ICD-10-CM

## 2021-11-15 LAB
ALBUMIN SERPL-MCNC: 3.6 G/DL (ref 3.5–5)
ALP SERPL-CCNC: 80 U/L (ref 45–120)
ALT SERPL W P-5'-P-CCNC: <9 U/L (ref 0–45)
ANION GAP SERPL CALCULATED.3IONS-SCNC: 9 MMOL/L (ref 5–18)
AST SERPL W P-5'-P-CCNC: 12 U/L (ref 0–40)
BASOPHILS # BLD AUTO: 0 10E3/UL (ref 0–0.2)
BASOPHILS NFR BLD AUTO: 1 %
BILIRUB SERPL-MCNC: 0.4 MG/DL (ref 0–1)
BUN SERPL-MCNC: 20 MG/DL (ref 8–28)
C REACTIVE PROTEIN LHE: 0.2 MG/DL (ref 0–0.8)
CALCIUM SERPL-MCNC: 9.9 MG/DL (ref 8.5–10.5)
CHLORIDE BLD-SCNC: 109 MMOL/L (ref 98–107)
CO2 SERPL-SCNC: 26 MMOL/L (ref 22–31)
CREAT SERPL-MCNC: 0.83 MG/DL (ref 0.6–1.1)
EOSINOPHIL # BLD AUTO: 0.4 10E3/UL (ref 0–0.7)
EOSINOPHIL NFR BLD AUTO: 7 %
ERYTHROCYTE [DISTWIDTH] IN BLOOD BY AUTOMATED COUNT: 13.5 % (ref 10–15)
GFR SERPL CREATININE-BSD FRML MDRD: 69 ML/MIN/1.73M2
GLUCOSE BLD-MCNC: 106 MG/DL (ref 70–125)
HCT VFR BLD AUTO: 36.3 % (ref 35–47)
HGB BLD-MCNC: 11.7 G/DL (ref 11.7–15.7)
IMM GRANULOCYTES # BLD: 0 10E3/UL
IMM GRANULOCYTES NFR BLD: 0 %
LYMPHOCYTES # BLD AUTO: 0.3 10E3/UL (ref 0.8–5.3)
LYMPHOCYTES NFR BLD AUTO: 5 %
MCH RBC QN AUTO: 32.5 PG (ref 26.5–33)
MCHC RBC AUTO-ENTMCNC: 32.2 G/DL (ref 31.5–36.5)
MCV RBC AUTO: 101 FL (ref 78–100)
MONOCYTES # BLD AUTO: 0.5 10E3/UL (ref 0–1.3)
MONOCYTES NFR BLD AUTO: 10 %
NEUTROPHILS # BLD AUTO: 3.9 10E3/UL (ref 1.6–8.3)
NEUTROPHILS NFR BLD AUTO: 77 %
NRBC # BLD AUTO: 0 10E3/UL
NRBC BLD AUTO-RTO: 0 /100
PLATELET # BLD AUTO: 223 10E3/UL (ref 150–450)
POTASSIUM BLD-SCNC: 3.5 MMOL/L (ref 3.5–5)
PROT SERPL-MCNC: 6.4 G/DL (ref 6–8)
RBC # BLD AUTO: 3.6 10E6/UL (ref 3.8–5.2)
SODIUM SERPL-SCNC: 144 MMOL/L (ref 136–145)
WBC # BLD AUTO: 5.1 10E3/UL (ref 4–11)

## 2021-11-15 PROCEDURE — 80053 COMPREHEN METABOLIC PANEL: CPT | Mod: ORL | Performed by: INTERNAL MEDICINE

## 2021-11-15 PROCEDURE — 86141 C-REACTIVE PROTEIN HS: CPT | Mod: ORL | Performed by: INTERNAL MEDICINE

## 2021-11-15 PROCEDURE — P9603 ONE-WAY ALLOW PRORATED MILES: HCPCS | Mod: ORL | Performed by: INTERNAL MEDICINE

## 2021-11-15 PROCEDURE — 85025 COMPLETE CBC W/AUTO DIFF WBC: CPT | Mod: ORL | Performed by: INTERNAL MEDICINE

## 2021-11-15 PROCEDURE — 36415 COLL VENOUS BLD VENIPUNCTURE: CPT | Mod: ORL | Performed by: INTERNAL MEDICINE

## 2021-11-15 NOTE — PROGRESS NOTES
NEURO-ONCOLOGY VISIT  Nov 16, 2021    CHIEF COMPLAINT: Ms. Olga Bailey is a 76 year old right-handed woman with a left frontoparietal glioblastoma (IDH wild-type, MGMT promoter methylated), diagnosed following gross total resection on 2/23/2021. Initiation of upfront cancer-directed treatment was delayed due to a complicated hospitalization. Given a resolving infection and lowered functional status, radiation alone was initiated on 5/4/2021 and completed on 5/27/2021.     Olga started adjuvant therapy with 150mg/m2 dosing of temozolomide, however, cycle 1 was complicated by significant hematologic toxicity. Dosing was changed to metronomic/ daily dosing in 7/2021 and this was well tolerated. Imaging from 8/2021 and 11/2021 demonstrates positive treatment effect.    Chemotherapy was placed on a hold in 10/2021 in the setting of severe constipation, but has since restarted.    Olga is presenting in follow-up accompanied by her daughter, La Nena.    HISTORY OF PRESENT ILLNESS  -Olga is doing much better today.  -Sleep is good, not using CPAP. Energy is good.   -Started a strong bowel regimen and bowels are moving, but irregular. However, appetite remains low. Nausea much improved. Restarted temozolomide on 10/28. Overall, tolerating daily-dosed temozolomide well.   -Mood is low, poor motivation. Not receiving good mental health support/ oversight at care facility. Memory is impaired, difficulty with finding words, confusion at times/ sundowning, and difficulty with concentration. PTSD from time in the hospital on BiPAP. Needing assistance with complex decision making. On many mental health/ centrally activating medications with limited oversight.   -She can walk with a walker. Gait is shuffling. Recent slip and fall. Notes leg swelling, worse when holding lasix. Looking to start wearing compression stockings. Dyspnea on exertion with walking, no chest pain. Open to a referral to Dr. Agudelo.  -No  headaches recently.  -Denies changes in sensation.  -Off dexamethasone.   -No recurrent seizures since her last visit, tolerating Keppra.  -On anticoagulation with no significant signs/ symptoms of bleeding.    REVIEW OF SYSTEMS  A comprehensive ROS negative except as in HPI.    MEDICATIONS   Current Outpatient Medications   Medication     acetaminophen (TYLENOL) 325 MG tablet     albuterol (PROAIR HFA/PROVENTIL HFA/VENTOLIN HFA) 108 (90 Base) MCG/ACT inhaler     albuterol (PROVENTIL) (2.5 MG/3ML) 0.083% neb solution     amLODIPine (NORVASC) 5 MG tablet     buPROPion (WELLBUTRIN XL) 150 MG 24 hr tablet     busPIRone HCl (BUSPAR) 30 MG tablet     calcium polycarbophil (FIBERCON) 625 MG tablet     docusate sodium (COLACE) 100 MG capsule     famotidine (PEPCID) 20 MG tablet     FLUoxetine (PROZAC) 20 MG capsule     furosemide (LASIX) 40 MG tablet     levETIRAcetam (KEPPRA) 1000 MG tablet     levETIRAcetam (KEPPRA) 250 MG tablet     LINZESS 290 MCG capsule     melatonin 3 MG tablet     Nutritional Supplements (ENSURE CLEAR) LIQD     ondansetron (ZOFRAN) 4 MG tablet     pantoprazole (PROTONIX) 40 MG EC tablet     polyethylene glycol (MIRALAX) 17 GM/Dose powder     potassium chloride ER (KLOR-CON M) 20 MEQ CR tablet     prochlorperazine (COMPAZINE) 10 MG tablet     QUEtiapine (SEROQUEL) 50 MG tablet     rivaroxaban ANTICOAGULANT (XARELTO ANTICOAGULANT) 20 MG TABS tablet     sennosides (SENOKOT) 8.6 MG tablet     temozolomide (TEMODAR) 100 MG capsule     No current facility-administered medications for this visit.     Current Outpatient Medications   Medication Sig Dispense Refill     acetaminophen (TYLENOL) 325 MG tablet Take 650 mg by mouth every 4 hours as needed for mild pain        albuterol (PROAIR HFA/PROVENTIL HFA/VENTOLIN HFA) 108 (90 Base) MCG/ACT inhaler Inhale 2 puffs into the lungs every 4 hours as needed for wheezing       albuterol (PROVENTIL) (2.5 MG/3ML) 0.083% neb solution Take 1 vial (2.5 mg) by  nebulization every 4 hours as needed for wheezing or shortness of breath / dyspnea       amLODIPine (NORVASC) 5 MG tablet Take 1 tablet (5 mg) by mouth daily       buPROPion (WELLBUTRIN XL) 150 MG 24 hr tablet Take 450 mg by mouth       busPIRone HCl (BUSPAR) 30 MG tablet Take 30 mg by mouth 2 times daily       calcium polycarbophil (FIBERCON) 625 MG tablet Take 1 tablet (625 mg) by mouth daily       docusate sodium (COLACE) 100 MG capsule Take 1 capsule (100 mg) by mouth 2 times daily as needed for constipation       famotidine (PEPCID) 20 MG tablet Take 20 mg by mouth       FLUoxetine (PROZAC) 20 MG capsule Take 3 capsules (60 mg) by mouth daily 30 capsule 0     furosemide (LASIX) 40 MG tablet Take 1 tablet (40 mg) by mouth daily 30 tablet 0     levETIRAcetam (KEPPRA) 1000 MG tablet TAKE 1 TAB BY MOUTH TWICE A DAY W/250mg=1,250mg       levETIRAcetam (KEPPRA) 250 MG tablet Take 5 tablets (1,250 mg) by mouth 2 times daily 60 tablet 0     LINZESS 290 MCG capsule TAKE 1 CAP BY MOUTH ONCE DAILY       melatonin 3 MG tablet Take 2 tablets (6 mg) by mouth nightly as needed for sleep 30 tablet 0     Nutritional Supplements (ENSURE CLEAR) LIQD TAKE ONE CAN BY MOUTH TWICE DAILY IN BETWEEN MEALS FOR WEIGHT LOSS       ondansetron (ZOFRAN) 4 MG tablet Take 1 tablet (4 mg) by mouth every 8 hours as needed for nausea 30 tablet 3     pantoprazole (PROTONIX) 40 MG EC tablet Take 1 tablet (40 mg) by mouth 2 times daily (before meals) 30 tablet 0     polyethylene glycol (MIRALAX) 17 GM/Dose powder Take 17 g by mouth daily as needed for constipation 510 g 0     potassium chloride ER (KLOR-CON M) 20 MEQ CR tablet Take 2 tablets (40 mEq) by mouth daily       prochlorperazine (COMPAZINE) 10 MG tablet Take one tablet 30 min prior to nighttime oral chemotherapy 30 tablet 1     QUEtiapine (SEROQUEL) 50 MG tablet TAKE 1 TAB BY MOUTH AT BEDTIME       rivaroxaban ANTICOAGULANT (XARELTO ANTICOAGULANT) 20 MG TABS tablet Take 1 tablet (20 mg) by  mouth daily (with dinner) 30 tablet 3     sennosides (SENOKOT) 8.6 MG tablet Take 1 tablet by mouth 2 times daily as needed for constipation 30 tablet 0     temozolomide (TEMODAR) 100 MG capsule Take 1 capsule (100 mg) by mouth daily Take at bedtime on an empty stomach. Use ondansetron as needed for nausea. 28 capsule 0     DRUG ALLERGIES   Allergies   Allergen Reactions     Bacitracin Rash     Bactroban is effective; No difficulties     Erythromycin      intolerant.     Meperidine Hcl      intolerant only   Demerol     Chloraprep One Step Rash       IMMUNIZATIONS   Immunization History   Administered Date(s) Administered     COVID-19,PF,Moderna 03/18/2021, 07/22/2021     Flu 65+ Years 09/28/2020     HepB 01/01/1990     Influenza (IIV3) PF 01/01/2001     Influenza Vaccine IM > 6 months Valent IIV4 (Alfuria,Fluzone) 09/28/2020     Influenza, Quad, High Dose, Pf, 65yr+ (Fluzone HD) 09/28/2020     Pneumo Conj 13-V (2010&after) 10/29/2014     Pneumococcal 23 valent 07/22/2020     TDAP Vaccine (Boostrix) 08/22/2016     Tetanus 06/13/2004     Zoster vaccine recombinant adjuvanted (SHINGRIX) 12/24/2019, 10/12/2020     Zoster vaccine, live 12/18/2008       ONCOLOGIC HISTORY  -2/2021 PRESENTATION: Progressive aphasia.   -2/19/2021 MR brain imaging with 3.3 x 2.8 x 2.8 cm enhancing mass in left frontal-parietal region. A second contrast enhancing lesion (0.5 x 1.0 x 1.0 cm) in right occipital lobe which is dural based and largely stable in size since 9/2011; likely representing a meningioma.  -2/23/2021 SURGERY: Gross total resection by Dr. Cummings  PATHOLOGY: Glioblastoma; IDH1-R132H wild-type/ IDH 1 and 2 wildtype, MGMT promoter methylated. Not BRAF mutated.   -3/23/2021 NEURO-ONC: Recommending chemoradiotherapy.   -3/2021 ADMISSION: SOB and chest pain; CT PA negative, but bilateral DVT noted on U/S. Started on Lovenox. Acute hypoxic respiratory failure in the setting of bilateral pneumonia; presumed PJP, concern for  transfusion-related acute lung injury and right hemidiaphragm paralysis. Seizure. Right retroperitoneal hematoma. Ileus. Non-severe malnutrition on TPN with concern for refeeding syndrome. 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 Shock Liver.  -5/4 - 5/27/2021 RADS: 15 fractions.   -5/25/2021 NEURO-ONC/ DEVICE: Discussed the role of Optune; to be considered. Repeat MRI in 4 weeks with plans to start adjuvant temozolomide.   -6/22/2021 NEURO-ONC/ MRB/ CHEMO: Clinically improving. Imaging largely stable. Starting adjuvant temozolomide 150mg/m2 (250mg), cycle 1 (start date 6/24).   -7/20/2021 NEURO-ONC/ CHEMO: Clinically improving. Thrombocytopenia noted with adjuvant temozolomide dosing, platelets of 26K; will transition to metronomic dosing 50mg/m2 (100mg) daily to start once platelets are > 80K.    -8/17/2021 NEURO-ONC/ MRB/ CHEMO: Clinically improving. Saw Dr. Gamboa, who recommended starting anticoagulation; on Eliquis. Imaging with positive treatment response. Continue metronomic/ daily dosing at 50mg/m2 (100mg) through 12/2021.    -9/14/2021 NEURO-ONC/ CHEMO: Clinically improving. Continue metronomic/ daily dosing at 50mg/m2 (100mg) through 12/2021.    -10/12/2021 NEURO-ONC/ CHEMO: Clinically improving. Holding temozolomide and Zofran in the setting of severe constipation. Abdominal x-ray with high stool burden; consulted Dr. Hodge for management of constipation given history of ileus.   -10/28/2021 CHEMO: Temozolomide restarted.   -11/16/2021 NEURO-ONC/ MRB/ CHEMO: Clinically improving Less constipation, continued low appetite; referral to palliative care for mental health management. Referral to Dr. Agudelo to optimize rehab. Imaging with continued positive treatment response. Continue daily temozolomide.     SOCIAL HISTORY   History   Smoking Status     Never Smoker   Smokeless Tobacco     Never Used    Social History    Substance and Sexual Activity      Alcohol use: Yes        Comment: very rare     History   Drug  "Use No       PHYSICAL EXAMINATION  /79 (BP Location: Left arm, Patient Position: Sitting, Cuff Size: Adult Regular)   Pulse 71   Temp 97.4  F (36.3  C) (Oral)   Resp 16   Wt 68.4 kg (150 lb 12.8 oz)   SpO2 96%   BMI 26.71 kg/m    Wt Readings from Last 2 Encounters:   11/16/21 68.4 kg (150 lb 12.8 oz)   10/12/21 68.2 kg (150 lb 6.4 oz)      Ht Readings from Last 2 Encounters:   09/10/21 1.6 m (5' 3\")   08/17/21 1.6 m (5' 2.99\")     KPS: 60-70    -Better energy today.  -Respiratory: No increased work of breathing.  -Skin: No facial rashes. Bruising on arms.   -Psychiatric: Normal mood and affect. Pleasant, talkative.  -Neurologic:   MENTAL STATUS:     Alert, oriented.    Speech fluent.   Comprehension intact to all commands.     CRANIAL NERVES:     Pupils are equal, round.     Extraocular movements full, denies diplopia.     Visual fields full.     Facial sensation intact to light touch.   No facial droop.   Hearing slightly decreased bilaterally.   Normal phonation.   MOTOR: Antigravity in arms.   SENSATION: Intact to light touch throughout.  COORDINATION: Intact finger-nose with eyes open and closed bilaterally.  GAIT: Can walked with a walker.       MEDICAL RECORDS  Obtained and personally reviewed all available outside medical records in addition to reviewing any records available in our electronic system.     LABS  Personally reviewed all available lab results; CBC and CMP.     IMAGING  Personally reviewed MR brain imaging from today and compared to prior imaging. To my eye, there is no concerning findings about the left parietal resection cavity with the small areas of enhancement along the margins unchanged. Nonspecific confluent T2 hyperintense signal throughout the cerebral hemispheres and ashwin is stable.    Incidental small dural based mass along the right occipital lobe probably representing incidental meningioma, unchanged.    Imaging was shown to and results were reviewed with Olga and " "La Nena.       IMPRESSION  Clinic time for this high complexity encounter was spent discussing in detail the nature of her cancer, providing emotional support, answering questions pertaining to my recommendations, and devising the plan as outlined below.     Clinically, Olga doing much better. While constipation and nausea have improved, her appetite remains low. She notes depression, poor motivation, and PTSD from her prolonged hospital stay earlier this year. She is on multiple \"mood\"/ centrally acting medications with no clear oversight with regard to management of her mental health. I think that medications can be consolidated and that she may benefit from Zyprexa for appetite stimulation, mood, and sleep. Olga is open to a referral to palliative care for assistance with mood and appropriate medication management.     Given her low energy, low stamina, falls, and gait instability, Olga is open to a consult with Dr. Agudelo for evaluation and optimizing therapy.     Restarted was temozolomide on 10/28. She continues to tolerate chemotherapy well. Labs from 11/15 reviewed and are without concern. Imaging as detailed above with no concerns and a continued positive response to treatment. Will repeat in 3 months.     PROBLEM LIST  Glioblastoma  Aphasia  Apraxia    PLAN  -CANCER DIRECTED THERAPY-  -Continue daily (metronomic) dosing of temozolomide at 50mg/m2 (100mg/ day).   Plan remains to complete 6 month course of adjuvant chemotherapy; potential stop date in mid-December.   -Supportive medications; Compazine as needed. Bowel regimen per GI provider; Dr. Hodge.  -Stopped Bactrim while off steroids and ALC is > 0.5.     -Continue lab monitoring; CBC every 2 weeks and CMP monthly.   -Repeat imaging in 3 months (2/2022).    -STEROIDS-  -Off dexamethasone.    -SEIZURE MANAGEMENT-  -Given history of seizures, antiepileptics are indicated.   -Continue Keppra.     -DVT-  -Following with Dr. Gamboa; currently on " Eliquis.   -Requires lifelong anticoagulation given cancer diagnosis.   -Will need to consider removal of the IVC filter.     -Quality of life/ MOOD/ FATIGUE-  -Depressed mood, PTSD, poor motivation, poor appetite.   -Referral to palliative care to assist with mental health.  -Recommend using CPAP for MARCELLE, as this could help with daytime fatigue.    -REHAB NEEDS-  -Completed PT/ OT.   -Referral to Dr. Agudelo with cancer rehab.     Return to clinic in ~1 month + labs.     In the meantime, Olga and La Nena know to call with questions or concerns or to report new complaints and can be seen sooner if needed.     Stephanie Baker MD  Neuro-oncology

## 2021-11-16 ENCOUNTER — DOCUMENTATION ONLY (OUTPATIENT)
Dept: PHARMACY | Facility: CLINIC | Age: 76
End: 2021-11-16
Payer: MEDICARE

## 2021-11-16 ENCOUNTER — HOSPITAL ENCOUNTER (OUTPATIENT)
Dept: MRI IMAGING | Facility: CLINIC | Age: 76
Discharge: HOME | End: 2021-11-16
Attending: PSYCHIATRY & NEUROLOGY | Admitting: PSYCHIATRY & NEUROLOGY
Payer: MEDICARE

## 2021-11-16 ENCOUNTER — ONCOLOGY VISIT (OUTPATIENT)
Dept: ONCOLOGY | Facility: CLINIC | Age: 76
End: 2021-11-16
Attending: PSYCHIATRY & NEUROLOGY
Payer: MEDICARE

## 2021-11-16 VITALS
RESPIRATION RATE: 16 BRPM | WEIGHT: 150.8 LBS | OXYGEN SATURATION: 96 % | SYSTOLIC BLOOD PRESSURE: 124 MMHG | HEART RATE: 71 BPM | DIASTOLIC BLOOD PRESSURE: 79 MMHG | BODY MASS INDEX: 26.71 KG/M2 | TEMPERATURE: 97.4 F

## 2021-11-16 DIAGNOSIS — C71.9 GLIOBLASTOMA (H): Primary | ICD-10-CM

## 2021-11-16 DIAGNOSIS — R63.0 ANOREXIA: ICD-10-CM

## 2021-11-16 DIAGNOSIS — F43.10 PTSD (POST-TRAUMATIC STRESS DISORDER): ICD-10-CM

## 2021-11-16 DIAGNOSIS — R26.81 GAIT INSTABILITY: ICD-10-CM

## 2021-11-16 DIAGNOSIS — R45.89 DEPRESSED MOOD: ICD-10-CM

## 2021-11-16 DIAGNOSIS — C71.9 GLIOBLASTOMA (H): ICD-10-CM

## 2021-11-16 PROCEDURE — 70553 MRI BRAIN STEM W/O & W/DYE: CPT | Mod: ME

## 2021-11-16 PROCEDURE — 99215 OFFICE O/P EST HI 40 MIN: CPT | Performed by: PSYCHIATRY & NEUROLOGY

## 2021-11-16 PROCEDURE — A9585 GADOBUTROL INJECTION: HCPCS | Performed by: PSYCHIATRY & NEUROLOGY

## 2021-11-16 PROCEDURE — 255N000002 HC RX 255 OP 636: Performed by: PSYCHIATRY & NEUROLOGY

## 2021-11-16 RX ORDER — BUPROPION HYDROCHLORIDE 150 MG/1
450 TABLET ORAL
Status: ON HOLD | COMMUNITY
Start: 2020-07-23 | End: 2022-04-05

## 2021-11-16 RX ORDER — GADOBUTROL 604.72 MG/ML
7 INJECTION INTRAVENOUS ONCE
Status: COMPLETED | OUTPATIENT
Start: 2021-11-16 | End: 2021-11-16

## 2021-11-16 RX ORDER — LEVETIRACETAM 1000 MG/1
TABLET ORAL
Status: ON HOLD | COMMUNITY
Start: 2021-10-25 | End: 2022-04-05

## 2021-11-16 RX ORDER — LACTOSE-REDUCED FOOD
LIQUID (ML) ORAL
COMMUNITY
Start: 2021-11-11 | End: 2022-10-04

## 2021-11-16 RX ORDER — FAMOTIDINE 20 MG/1
20 TABLET, FILM COATED ORAL
Status: ON HOLD | COMMUNITY
Start: 2021-03-08 | End: 2022-04-05

## 2021-11-16 RX ORDER — POTASSIUM CHLORIDE 750 MG/1
TABLET, EXTENDED RELEASE ORAL
COMMUNITY
Start: 2021-11-15 | End: 2021-11-16

## 2021-11-16 RX ORDER — QUETIAPINE FUMARATE 50 MG/1
TABLET, FILM COATED ORAL
Status: ON HOLD | COMMUNITY
Start: 2021-10-25 | End: 2022-04-18

## 2021-11-16 RX ORDER — LINACLOTIDE 290 UG/1
CAPSULE, GELATIN COATED ORAL
COMMUNITY
Start: 2021-10-15 | End: 2021-12-19

## 2021-11-16 RX ADMIN — GADOBUTROL 7 ML: 604.72 INJECTION INTRAVENOUS at 10:03

## 2021-11-16 ASSESSMENT — PAIN SCALES - GENERAL: PAINLEVEL: NO PAIN (0)

## 2021-11-16 NOTE — PROGRESS NOTES
Oral Chemotherapy Monitoring Program  Lab Follow Up    Reviewed lab results from 11/15/2021.    ORAL CHEMOTHERAPY 3/26/2021 6/22/2021 8/2/2021 8/6/2021 8/18/2021 11/16/2021   Assessment Type New Teach New Teach Lab Monitoring New Teach Lab Monitoring Lab Monitoring   Diagnosis Code Brain Cancer - Glioblastoma Brain Cancer - Glioblastoma Brain Cancer - Glioblastoma Brain Cancer - Glioblastoma Brain Cancer - Glioblastoma Brain Cancer - Glioblastoma   Providers Samuel Baker   Clinic Name/Location [facility]   Drug Name Temodar (temozolomide) Temodar (temozolomide) Temodar (temozolomide) Temodar (temozolomide) Temodar (temozolomide) Temodar (temozolomide)   Dose 140 mg 250 mg 100 mg 100 mg 100 mg 100 mg   Current Schedule Daily Daily Daily Daily Daily Daily   Cycle Details Continuous for 42 days during XRT 5 days on, 23 days off Continuous Continuous Continuous Continuous   Start Date of Last Cycle - - 7/23/2021 7/23/2021 - -   Planned next cycle start date - 6/24/2021 - - - -   Adverse Effects - - - - No AE identified during assessment No AE identified during assessment   Any new drug interactions? - No - - - -   Is the dose as ordered appropriate for the patient? - Yes - - - -       Labs:  _  Result Component Current Result Ref Range   Sodium 144 (11/15/2021) 136 - 145 mmol/L     _  Result Component Current Result Ref Range   Potassium 3.5 (11/15/2021) 3.5 - 5.0 mmol/L     _  Result Component Current Result Ref Range   Calcium 9.9 (11/15/2021) 8.5 - 10.5 mg/dL     No results found for Mag within last 30 days.     No results found for Phos within last 30 days.     _  Result Component Current Result Ref Range   Albumin 3.6 (11/15/2021) 3.5 - 5.0 g/dL     _  Result Component Current Result Ref Range   Urea Nitrogen 20 (11/15/2021) 8 - 28 mg/dL     _  Result Component Current Result Ref Range   Creatinine 0.83 (11/15/2021) 0.60 - 1.10 mg/dL      _  Result Component Current Result Ref Range   AST 12 (11/15/2021) 0 - 40 U/L     _  Result Component Current Result Ref Range   ALT <9 (11/15/2021) 0 - 45 U/L     _  Result Component Current Result Ref Range   Bilirubin Total 0.4 (11/15/2021) 0.0 - 1.0 mg/dL     _  Result Component Current Result Ref Range   WBC Count 5.1 (11/15/2021) 4.0 - 11.0 10e3/uL     _  Result Component Current Result Ref Range   Hemoglobin 11.7 (11/15/2021) 11.7 - 15.7 g/dL     _  Result Component Current Result Ref Range   Platelet Count 223 (11/15/2021) 150 - 450 10e3/uL     Result Component Current Result Ref Range   ANC 3.9 (11/15/2021) 1.6 - 8.3 10e3/uL       Assessment & Plan:  No concerning abnormalities. Ok to proceed with temozolomide.    Questions answered to patient's satisfaction.    Follow-Up:  2 weeks with labs - Dr. Baker's note indicates getting a CBC q2wks.    Evette Fowler, Pharmacist Intern  November 16, 2021    Agree with assessment and plan as outlined by Evette, pharmacy intern.     Michael Lewis, PharmD, MS  Hematology/Oncology Clinical Pharmacist  [facility]  [phone]

## 2021-11-16 NOTE — LETTER
11/16/2021         RE: Olga Bailey  [address] [facility] [address]        Dear Colleague,    Thank you for referring your patient, Olga Bailey, to the [facility] [place]. Please see a copy of my visit note below.    NEURO-ONCOLOGY VISIT  Nov 16, 2021    CHIEF COMPLAINT: Ms. Olga Bailey is a 76 year old right-handed woman with a left frontoparietal glioblastoma (IDH wild-type, MGMT promoter methylated), diagnosed following gross total resection on 2/23/2021. Initiation of upfront cancer-directed treatment was delayed due to a complicated hospitalization. Given a resolving infection and lowered functional status, radiation alone was initiated on 5/4/2021 and completed on 5/27/2021.     Olga started adjuvant therapy with 150mg/m2 dosing of temozolomide, however, cycle 1 was complicated by significant hematologic toxicity. Dosing was changed to metronomic/ daily dosing in 7/2021 and this was well tolerated. Imaging from 8/2021 and 11/2021 demonstrates positive treatment effect.    Chemotherapy was placed on a hold in 10/2021 in the setting of severe constipation, but has since restarted.    Olga is presenting in follow-up accompanied by her daughter, La Nena.    HISTORY OF PRESENT ILLNESS  -Olga is doing much better today.  -Sleep is good, not using CPAP. Energy is good.   -Started a strong bowel regimen and bowels are moving, but irregular. However, appetite remains low. Nausea much improved. Restarted temozolomide on 10/28. Overall, tolerating daily-dosed temozolomide well.   -Mood is low, poor motivation. Not receiving good mental health support/ oversight at care facility. Memory is impaired, difficulty with finding words, confusion at times/ sundowning, and difficulty with concentration. PTSD from time in the hospital on BiPAP. Needing assistance with complex decision making. On many mental health/ centrally activating medications with limited  oversight.   -She can walk with a walker. Gait is shuffling. Recent slip and fall. Notes leg swelling, worse when holding lasix. Looking to start wearing compression stockings. Dyspnea on exertion with walking, no chest pain. Open to a referral to Dr. Agudelo.  -No headaches recently.  -Denies changes in sensation.  -Off dexamethasone.   -No recurrent seizures since her last visit, tolerating Keppra.  -On anticoagulation with no significant signs/ symptoms of bleeding.    REVIEW OF SYSTEMS  A comprehensive ROS negative except as in HPI.    MEDICATIONS   Current Outpatient Medications   Medication     acetaminophen (TYLENOL) 325 MG tablet     albuterol (PROAIR HFA/PROVENTIL HFA/VENTOLIN HFA) 108 (90 Base) MCG/ACT inhaler     albuterol (PROVENTIL) (2.5 MG/3ML) 0.083% neb solution     amLODIPine (NORVASC) 5 MG tablet     buPROPion (WELLBUTRIN XL) 150 MG 24 hr tablet     busPIRone HCl (BUSPAR) 30 MG tablet     calcium polycarbophil (FIBERCON) 625 MG tablet     docusate sodium (COLACE) 100 MG capsule     famotidine (PEPCID) 20 MG tablet     FLUoxetine (PROZAC) 20 MG capsule     furosemide (LASIX) 40 MG tablet     levETIRAcetam (KEPPRA) 1000 MG tablet     levETIRAcetam (KEPPRA) 250 MG tablet     LINZESS 290 MCG capsule     melatonin 3 MG tablet     Nutritional Supplements (ENSURE CLEAR) LIQD     ondansetron (ZOFRAN) 4 MG tablet     pantoprazole (PROTONIX) 40 MG EC tablet     polyethylene glycol (MIRALAX) 17 GM/Dose powder     potassium chloride ER (KLOR-CON M) 20 MEQ CR tablet     prochlorperazine (COMPAZINE) 10 MG tablet     QUEtiapine (SEROQUEL) 50 MG tablet     rivaroxaban ANTICOAGULANT (XARELTO ANTICOAGULANT) 20 MG TABS tablet     sennosides (SENOKOT) 8.6 MG tablet     temozolomide (TEMODAR) 100 MG capsule     No current facility-administered medications for this visit.     Current Outpatient Medications   Medication Sig Dispense Refill     acetaminophen (TYLENOL) 325 MG tablet Take 650 mg by mouth every 4 hours as  needed for mild pain        albuterol (PROAIR HFA/PROVENTIL HFA/VENTOLIN HFA) 108 (90 Base) MCG/ACT inhaler Inhale 2 puffs into the lungs every 4 hours as needed for wheezing       albuterol (PROVENTIL) (2.5 MG/3ML) 0.083% neb solution Take 1 vial (2.5 mg) by nebulization every 4 hours as needed for wheezing or shortness of breath / dyspnea       amLODIPine (NORVASC) 5 MG tablet Take 1 tablet (5 mg) by mouth daily       buPROPion (WELLBUTRIN XL) 150 MG 24 hr tablet Take 450 mg by mouth       busPIRone HCl (BUSPAR) 30 MG tablet Take 30 mg by mouth 2 times daily       calcium polycarbophil (FIBERCON) 625 MG tablet Take 1 tablet (625 mg) by mouth daily       docusate sodium (COLACE) 100 MG capsule Take 1 capsule (100 mg) by mouth 2 times daily as needed for constipation       famotidine (PEPCID) 20 MG tablet Take 20 mg by mouth       FLUoxetine (PROZAC) 20 MG capsule Take 3 capsules (60 mg) by mouth daily 30 capsule 0     furosemide (LASIX) 40 MG tablet Take 1 tablet (40 mg) by mouth daily 30 tablet 0     levETIRAcetam (KEPPRA) 1000 MG tablet TAKE 1 TAB BY MOUTH TWICE A DAY W/250mg=1,250mg       levETIRAcetam (KEPPRA) 250 MG tablet Take 5 tablets (1,250 mg) by mouth 2 times daily 60 tablet 0     LINZESS 290 MCG capsule TAKE 1 CAP BY MOUTH ONCE DAILY       melatonin 3 MG tablet Take 2 tablets (6 mg) by mouth nightly as needed for sleep 30 tablet 0     Nutritional Supplements (ENSURE CLEAR) LIQD TAKE ONE CAN BY MOUTH TWICE DAILY IN BETWEEN MEALS FOR WEIGHT LOSS       ondansetron (ZOFRAN) 4 MG tablet Take 1 tablet (4 mg) by mouth every 8 hours as needed for nausea 30 tablet 3     pantoprazole (PROTONIX) 40 MG EC tablet Take 1 tablet (40 mg) by mouth 2 times daily (before meals) 30 tablet 0     polyethylene glycol (MIRALAX) 17 GM/Dose powder Take 17 g by mouth daily as needed for constipation 510 g 0     potassium chloride ER (KLOR-CON M) 20 MEQ CR tablet Take 2 tablets (40 mEq) by mouth daily       prochlorperazine  (COMPAZINE) 10 MG tablet Take one tablet 30 min prior to nighttime oral chemotherapy 30 tablet 1     QUEtiapine (SEROQUEL) 50 MG tablet TAKE 1 TAB BY MOUTH AT BEDTIME       rivaroxaban ANTICOAGULANT (XARELTO ANTICOAGULANT) 20 MG TABS tablet Take 1 tablet (20 mg) by mouth daily (with dinner) 30 tablet 3     sennosides (SENOKOT) 8.6 MG tablet Take 1 tablet by mouth 2 times daily as needed for constipation 30 tablet 0     temozolomide (TEMODAR) 100 MG capsule Take 1 capsule (100 mg) by mouth daily Take at bedtime on an empty stomach. Use ondansetron as needed for nausea. 28 capsule 0     DRUG ALLERGIES   Allergies   Allergen Reactions     Bacitracin Rash     Bactroban is effective; No difficulties     Erythromycin      intolerant.     Meperidine Hcl      intolerant only   Demerol     Chloraprep One Step Rash       IMMUNIZATIONS   Immunization History   Administered Date(s) Administered     COVID-19,PF,Moderna 03/18/2021, 07/22/2021     Flu 65+ Years 09/28/2020     HepB 01/01/1990     Influenza (IIV3) PF 01/01/2001     Influenza Vaccine IM > 6 months Valent IIV4 (Alfuria,Fluzone) 09/28/2020     Influenza, Quad, High Dose, Pf, 65yr+ (Fluzone HD) 09/28/2020     Pneumo Conj 13-V (2010&after) 10/29/2014     Pneumococcal 23 valent 07/22/2020     TDAP Vaccine (Boostrix) 08/22/2016     Tetanus 06/13/2004     Zoster vaccine recombinant adjuvanted (SHINGRIX) 12/24/2019, 10/12/2020     Zoster vaccine, live 12/18/2008       ONCOLOGIC HISTORY  -2/2021 PRESENTATION: Progressive aphasia.   -2/19/2021 MR brain imaging with 3.3 x 2.8 x 2.8 cm enhancing mass in left frontal-parietal region. A second contrast enhancing lesion (0.5 x 1.0 x 1.0 cm) in right occipital lobe which is dural based and largely stable in size since 9/2011; likely representing a meningioma.  -2/23/2021 SURGERY: Gross total resection by Dr. Cummings  PATHOLOGY: Glioblastoma; IDH1-R132H wild-type/ IDH 1 and 2 wildtype, MGMT promoter methylated. Not BRAF mutated.    -3/23/2021 NEURO-ONC: Recommending chemoradiotherapy.   -3/2021 ADMISSION: SOB and chest pain; CT PA negative, but bilateral DVT noted on U/S. Started on Lovenox. Acute hypoxic respiratory failure in the setting of bilateral pneumonia; presumed PJP, concern for transfusion-related acute lung injury and right hemidiaphragm paralysis. Seizure. Right retroperitoneal hematoma. Ileus. Non-severe malnutrition on TPN with concern for refeeding syndrome. 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 Shock Liver.  -5/4 - 5/27/2021 RADS: 15 fractions.   -5/25/2021 NEURO-ONC/ DEVICE: Discussed the role of Optune; to be considered. Repeat MRI in 4 weeks with plans to start adjuvant temozolomide.   -6/22/2021 NEURO-ONC/ MRB/ CHEMO: Clinically improving. Imaging largely stable. Starting adjuvant temozolomide 150mg/m2 (250mg), cycle 1 (start date 6/24).   -7/20/2021 NEURO-ONC/ CHEMO: Clinically improving. Thrombocytopenia noted with adjuvant temozolomide dosing, platelets of 26K; will transition to metronomic dosing 50mg/m2 (100mg) daily to start once platelets are > 80K.    -8/17/2021 NEURO-ONC/ MRB/ CHEMO: Clinically improving. Saw Dr. Gamboa, who recommended starting anticoagulation; on Eliquis. Imaging with positive treatment response. Continue metronomic/ daily dosing at 50mg/m2 (100mg) through 12/2021.    -9/14/2021 NEURO-ONC/ CHEMO: Clinically improving. Continue metronomic/ daily dosing at 50mg/m2 (100mg) through 12/2021.    -10/12/2021 NEURO-ONC/ CHEMO: Clinically improving. Holding temozolomide and Zofran in the setting of severe constipation. Abdominal x-ray with high stool burden; consulted Dr. Hodge for management of constipation given history of ileus.   -10/28/2021 CHEMO: Temozolomide restarted.   -11/16/2021 NEURO-ONC/ MRB/ CHEMO: Clinically improving Less constipation, continued low appetite; referral to palliative care for mental health management. Referral to Dr. Agudelo to optimize rehab. Imaging with continued positive  "treatment response. Continue daily temozolomide.     SOCIAL HISTORY   History   Smoking Status     Never Smoker   Smokeless Tobacco     Never Used    Social History    Substance and Sexual Activity      Alcohol use: Yes        Comment: very rare     History   Drug Use No       PHYSICAL EXAMINATION  /79 (BP Location: Left arm, Patient Position: Sitting, Cuff Size: Adult Regular)   Pulse 71   Temp 97.4  F (36.3  C) (Oral)   Resp 16   Wt 68.4 kg (150 lb 12.8 oz)   SpO2 96%   BMI 26.71 kg/m    Wt Readings from Last 2 Encounters:   11/16/21 68.4 kg (150 lb 12.8 oz)   10/12/21 68.2 kg (150 lb 6.4 oz)      Ht Readings from Last 2 Encounters:   09/10/21 1.6 m (5' 3\")   08/17/21 1.6 m (5' 2.99\")     KPS: 60-70    -Better energy today.  -Respiratory: No increased work of breathing.  -Skin: No facial rashes. Bruising on arms.   -Psychiatric: Normal mood and affect. Pleasant, talkative.  -Neurologic:   MENTAL STATUS:     Alert, oriented.    Speech fluent.   Comprehension intact to all commands.     CRANIAL NERVES:     Pupils are equal, round.     Extraocular movements full, denies diplopia.     Visual fields full.     Facial sensation intact to light touch.   No facial droop.   Hearing slightly decreased bilaterally.   Normal phonation.   MOTOR: Antigravity in arms.   SENSATION: Intact to light touch throughout.  COORDINATION: Intact finger-nose with eyes open and closed bilaterally.  GAIT: Can walked with a walker.       MEDICAL RECORDS  Obtained and personally reviewed all available outside medical records in addition to reviewing any records available in our electronic system.     LABS  Personally reviewed all available lab results; CBC and CMP.     IMAGING  Personally reviewed MR brain imaging from today and compared to prior imaging. To my eye, there is no concerning findings about the left parietal resection cavity with the small areas of enhancement along the margins unchanged. Nonspecific confluent T2 " "hyperintense signal throughout the cerebral hemispheres and ashwin is stable.    Incidental small dural based mass along the right occipital lobe probably representing incidental meningioma, unchanged.    Imaging was shown to and results were reviewed with Olga and La Nena.       IMPRESSION  Clinic time for this high complexity encounter was spent discussing in detail the nature of her cancer, providing emotional support, answering questions pertaining to my recommendations, and devising the plan as outlined below.     Clinically, Olga doing much better. While constipation and nausea have improved, her appetite remains low. She notes depression, poor motivation, and PTSD from her prolonged hospital stay earlier this year. She is on multiple \"mood\"/ centrally acting medications with no clear oversight with regard to management of her mental health. I think that medications can be consolidated and that she may benefit from Zyprexa for appetite stimulation, mood, and sleep. Olga is open to a referral to palliative care for assistance with mood and appropriate medication management.     Given her low energy, low stamina, falls, and gait instability, Olga is open to a consult with Dr. Agudelo for evaluation and optimizing therapy.     Restarted was temozolomide on 10/28. She continues to tolerate chemotherapy well. Labs from 11/15 reviewed and are without concern. Imaging as detailed above with no concerns and a continued positive response to treatment. Will repeat in 3 months.     PROBLEM LIST  Glioblastoma  Aphasia  Apraxia    PLAN  -CANCER DIRECTED THERAPY-  -Continue daily (metronomic) dosing of temozolomide at 50mg/m2 (100mg/ day).   Plan remains to complete 6 month course of adjuvant chemotherapy; potential stop date in mid-December.   -Supportive medications; Compazine as needed. Bowel regimen per GI provider; Dr. Hodge.  -Stopped Bactrim while off steroids and ALC is > 0.5.     -Continue lab " "monitoring; CBC every 2 weeks and CMP monthly.   -Repeat imaging in 3 months (2/2022).    -STEROIDS-  -Off dexamethasone.    -SEIZURE MANAGEMENT-  -Given history of seizures, antiepileptics are indicated.   -Continue Keppra.     -DVT-  -Following with Dr. Gamboa; currently on Eliquis.   -Requires lifelong anticoagulation given cancer diagnosis.   -Will need to consider removal of the IVC filter.     -Quality of life/ MOOD/ FATIGUE-  -Depressed mood, PTSD, poor motivation, poor appetite.   -Referral to palliative care to assist with mental health.  -Recommend using CPAP for MARCELLE, as this could help with daytime fatigue.    -REHAB NEEDS-  -Completed PT/ OT.   -Referral to Dr. Agudelo with cancer rehab.     Return to clinic in ~1 month + labs.     In the meantime, Olga and La Nena know to call with questions or concerns or to report new complaints and can be seen sooner if needed.     Stephanie Baker MD  Neuro-oncology      Oncology Rooming Note    November 16, 2021 10:42 AM   Olga Bailey is a 76 year old female who presents for:    Chief Complaint   Patient presents with     Oncology Clinic Visit     GBM (glioblastoma multiforme) (H)     Initial Vitals: /79 (BP Location: Left arm, Patient Position: Sitting, Cuff Size: Adult Regular)   Pulse 71   Temp 97.4  F (36.3  C) (Oral)   Resp 16   Wt 68.4 kg (150 lb 12.8 oz)   SpO2 96%   BMI 26.71 kg/m   Estimated body mass index is 26.71 kg/m  as calculated from the following:    Height as of 9/10/21: 1.6 m (5' 3\").    Weight as of this encounter: 68.4 kg (150 lb 12.8 oz). Body surface area is 1.74 meters squared.  No Pain (0) Comment: Data Unavailable   No LMP recorded. Patient has had a hysterectomy.  Allergies reviewed: Yes  Medications reviewed: Yes    Medications: Medication refills not needed today.  Pharmacy name entered into [employer]:    [id number] - [place], [place] - [address] AT [facility] [address]  [facility] - [place], [place] - [address]  [facility] [id number] - [place], [place] - [address]    Clinical concerns: no     Paula Turner CMA                  Again, thank you for allowing me to participate in the care of your patient.        Sincerely,        Setphanie Baker MD

## 2021-11-17 ENCOUNTER — PATIENT OUTREACH (OUTPATIENT)
Dept: ONCOLOGY | Facility: CLINIC | Age: 76
End: 2021-11-17
Payer: MEDICARE

## 2021-11-17 NOTE — PROGRESS NOTES
received referral for Dr Agudelo in the PM&R clinic. Scheduling instructions updated and sent to New Patient Scheduling for completion.

## 2021-11-18 ENCOUNTER — LAB REQUISITION (OUTPATIENT)
Dept: LAB | Facility: CLINIC | Age: 76
End: 2021-11-18
Payer: MEDICARE

## 2021-11-18 DIAGNOSIS — N18.30 CHRONIC KIDNEY DISEASE, STAGE 3 UNSPECIFIED (H): ICD-10-CM

## 2021-11-19 LAB
ANION GAP SERPL CALCULATED.3IONS-SCNC: 10 MMOL/L (ref 5–18)
BUN SERPL-MCNC: 15 MG/DL (ref 8–28)
CALCIUM SERPL-MCNC: 9.9 MG/DL (ref 8.5–10.5)
CHLORIDE BLD-SCNC: 109 MMOL/L (ref 98–107)
CO2 SERPL-SCNC: 25 MMOL/L (ref 22–31)
CREAT SERPL-MCNC: 0.92 MG/DL (ref 0.6–1.1)
GFR SERPL CREATININE-BSD FRML MDRD: 61 ML/MIN/1.73M2
GLUCOSE BLD-MCNC: 138 MG/DL (ref 70–125)
POTASSIUM BLD-SCNC: 3.3 MMOL/L (ref 3.5–5)
SODIUM SERPL-SCNC: 144 MMOL/L (ref 136–145)

## 2021-11-19 PROCEDURE — P9603 ONE-WAY ALLOW PRORATED MILES: HCPCS | Mod: ORL | Performed by: FAMILY MEDICINE

## 2021-11-19 PROCEDURE — 36415 COLL VENOUS BLD VENIPUNCTURE: CPT | Mod: ORL | Performed by: FAMILY MEDICINE

## 2021-11-19 PROCEDURE — 80048 BASIC METABOLIC PNL TOTAL CA: CPT | Mod: ORL | Performed by: FAMILY MEDICINE

## 2021-11-22 ENCOUNTER — LAB REQUISITION (OUTPATIENT)
Dept: LAB | Facility: CLINIC | Age: 76
End: 2021-11-22
Payer: MEDICARE

## 2021-11-22 DIAGNOSIS — I47.0 RE-ENTRY VENTRICULAR ARRHYTHMIA (H): ICD-10-CM

## 2021-11-22 DIAGNOSIS — D61.810 ANTINEOPLASTIC CHEMOTHERAPY INDUCED PANCYTOPENIA (CODE) (H): ICD-10-CM

## 2021-11-22 DIAGNOSIS — R11.2 NAUSEA WITH VOMITING, UNSPECIFIED: ICD-10-CM

## 2021-11-23 NOTE — PROGRESS NOTES
RECORDS STATUS - ALL OTHER DIAGNOSIS      RECORDS RECEIVED FROM: [facility]   DATE RECEIVED: 11/30/2021   NOTES STATUS DETAILS     OFFICE NOTE from referring provider Complete Epic -Stephanie Baker MD   OFFICE NOTE from medical oncologist Complete Epic   11/16/2021 Glioblastoma     10/12/2021 GBM    More in EPIC   DISCHARGE SUMMARY from hospital     DISCHARGE REPORT from the ER     OPERATIVE REPORT N/A    MEDICATION LIST Complete [facility]   CLINICAL TRIAL TREATMENTS TO DATE     LABS     PATHOLOGY REPORTS     ANYTHING RELATED TO DIAGNOSIS Complete Labs last updated on 11/23/2021   GENONOMIC TESTING     TYPE:     IMAGING (NEED IMAGES & REPORT)     CT SCANS Complete CT Head 6/27/2021   MRI Complete MRI Brain 11/16/2021, 8/17/2021   MAMMO     ULTRASOUND     PET

## 2021-11-24 LAB
ALBUMIN SERPL-MCNC: 3.1 G/DL (ref 3.5–5)
ALP SERPL-CCNC: 80 U/L (ref 45–120)
ALT SERPL W P-5'-P-CCNC: <9 U/L (ref 0–45)
ANION GAP SERPL CALCULATED.3IONS-SCNC: 10 MMOL/L (ref 5–18)
AST SERPL W P-5'-P-CCNC: 14 U/L (ref 0–40)
BASOPHILS # BLD AUTO: 0 10E3/UL (ref 0–0.2)
BASOPHILS NFR BLD AUTO: 1 %
BILIRUB SERPL-MCNC: 0.4 MG/DL (ref 0–1)
BUN SERPL-MCNC: 12 MG/DL (ref 8–28)
CALCIUM SERPL-MCNC: 9.2 MG/DL (ref 8.5–10.5)
CHLORIDE BLD-SCNC: 109 MMOL/L (ref 98–107)
CO2 SERPL-SCNC: 26 MMOL/L (ref 22–31)
CREAT SERPL-MCNC: 0.85 MG/DL (ref 0.6–1.1)
EOSINOPHIL # BLD AUTO: 0.3 10E3/UL (ref 0–0.7)
EOSINOPHIL NFR BLD AUTO: 7 %
ERYTHROCYTE [DISTWIDTH] IN BLOOD BY AUTOMATED COUNT: 13.4 % (ref 10–15)
GFR SERPL CREATININE-BSD FRML MDRD: 67 ML/MIN/1.73M2
GLUCOSE BLD-MCNC: 79 MG/DL (ref 70–125)
HCT VFR BLD AUTO: 32.4 % (ref 35–47)
HGB BLD-MCNC: 10.3 G/DL (ref 11.7–15.7)
IMM GRANULOCYTES # BLD: 0 10E3/UL
IMM GRANULOCYTES NFR BLD: 0 %
LYMPHOCYTES # BLD AUTO: 0.2 10E3/UL (ref 0.8–5.3)
LYMPHOCYTES NFR BLD AUTO: 5 %
MCH RBC QN AUTO: 31.8 PG (ref 26.5–33)
MCHC RBC AUTO-ENTMCNC: 31.8 G/DL (ref 31.5–36.5)
MCV RBC AUTO: 100 FL (ref 78–100)
MONOCYTES # BLD AUTO: 0.5 10E3/UL (ref 0–1.3)
MONOCYTES NFR BLD AUTO: 13 %
NEUTROPHILS # BLD AUTO: 2.8 10E3/UL (ref 1.6–8.3)
NEUTROPHILS NFR BLD AUTO: 74 %
NRBC # BLD AUTO: 0 10E3/UL
NRBC BLD AUTO-RTO: 0 /100
PLATELET # BLD AUTO: 158 10E3/UL (ref 150–450)
POTASSIUM BLD-SCNC: 3.4 MMOL/L (ref 3.5–5)
PROT SERPL-MCNC: 5.5 G/DL (ref 6–8)
RBC # BLD AUTO: 3.24 10E6/UL (ref 3.8–5.2)
SODIUM SERPL-SCNC: 145 MMOL/L (ref 136–145)
WBC # BLD AUTO: 3.8 10E3/UL (ref 4–11)

## 2021-11-24 PROCEDURE — 36415 COLL VENOUS BLD VENIPUNCTURE: CPT | Mod: ORL | Performed by: PSYCHIATRY & NEUROLOGY

## 2021-11-24 PROCEDURE — P9603 ONE-WAY ALLOW PRORATED MILES: HCPCS | Mod: ORL | Performed by: PSYCHIATRY & NEUROLOGY

## 2021-11-24 PROCEDURE — 80053 COMPREHEN METABOLIC PANEL: CPT | Mod: ORL | Performed by: PSYCHIATRY & NEUROLOGY

## 2021-11-24 PROCEDURE — 85025 COMPLETE CBC W/AUTO DIFF WBC: CPT | Mod: ORL | Performed by: PSYCHIATRY & NEUROLOGY

## 2021-11-26 ENCOUNTER — PATIENT OUTREACH (OUTPATIENT)
Dept: ONCOLOGY | Facility: CLINIC | Age: 76
End: 2021-11-26
Payer: MEDICARE

## 2021-11-26 DIAGNOSIS — Z91.89 HIGH RISK FOR CHEMOTHERAPY-INDUCED INFECTIOUS COMPLICATION: ICD-10-CM

## 2021-11-26 DIAGNOSIS — T45.1X5A ANTINEOPLASTIC CHEMOTHERAPY INDUCED PANCYTOPENIA (H): ICD-10-CM

## 2021-11-26 DIAGNOSIS — D61.810 ANTINEOPLASTIC CHEMOTHERAPY INDUCED PANCYTOPENIA (H): ICD-10-CM

## 2021-11-26 RX ORDER — SULFAMETHOXAZOLE/TRIMETHOPRIM 800-160 MG
1 TABLET ORAL
Qty: 12 TABLET | Refills: 0 | Status: SHIPPED | OUTPATIENT
Start: 2021-11-26 | End: 2022-02-27

## 2021-11-26 NOTE — PROGRESS NOTES
Per discussion with Dr. Baker/pharmacy team, patient to resume Bactrim MWF due to low ALC 0.2.     Contacted [facility] and spoke with Marvel RN who will restart Bactrim tonight. Rx sent to [facility]. Updated pt's daughter La Nena on plan.    Louise Jerry, JIANN, RN, PHN, OCN  Oncology Care Coordinator  [facility]

## 2021-11-30 ENCOUNTER — ONCOLOGY VISIT (OUTPATIENT)
Dept: ONCOLOGY | Facility: CLINIC | Age: 76
End: 2021-11-30
Attending: PSYCHIATRY & NEUROLOGY
Payer: MEDICARE

## 2021-11-30 ENCOUNTER — PATIENT OUTREACH (OUTPATIENT)
Dept: ONCOLOGY | Facility: CLINIC | Age: 76
End: 2021-11-30
Payer: MEDICARE

## 2021-11-30 ENCOUNTER — PRE VISIT (OUTPATIENT)
Dept: ONCOLOGY | Facility: CLINIC | Age: 76
End: 2021-11-30

## 2021-11-30 VITALS
OXYGEN SATURATION: 94 % | HEART RATE: 106 BPM | RESPIRATION RATE: 16 BRPM | SYSTOLIC BLOOD PRESSURE: 131 MMHG | DIASTOLIC BLOOD PRESSURE: 88 MMHG

## 2021-11-30 DIAGNOSIS — C71.9 GLIOBLASTOMA (H): Primary | ICD-10-CM

## 2021-11-30 DIAGNOSIS — R26.81 GAIT INSTABILITY: ICD-10-CM

## 2021-11-30 DIAGNOSIS — R53.81 PHYSICAL DECONDITIONING: ICD-10-CM

## 2021-11-30 DIAGNOSIS — R41.89 COGNITIVE CHANGES: ICD-10-CM

## 2021-11-30 PROCEDURE — G0463 HOSPITAL OUTPT CLINIC VISIT: HCPCS

## 2021-11-30 PROCEDURE — 99205 OFFICE O/P NEW HI 60 MIN: CPT | Mod: GC | Performed by: STUDENT IN AN ORGANIZED HEALTH CARE EDUCATION/TRAINING PROGRAM

## 2021-11-30 PROCEDURE — 99417 PROLNG OP E/M EACH 15 MIN: CPT | Mod: GC | Performed by: STUDENT IN AN ORGANIZED HEALTH CARE EDUCATION/TRAINING PROGRAM

## 2021-11-30 ASSESSMENT — PAIN SCALES - GENERAL: PAINLEVEL: NO PAIN (0)

## 2021-11-30 NOTE — PATIENT INSTRUCTIONS
1. A physical and cognitive therapy referral was made to get therapies at your facility.  2. Follow up with Dr. Agudelo in 3-4 months.

## 2021-11-30 NOTE — PROGRESS NOTES
"Oncology Rooming Note    November 30, 2021 10:31 AM   Olga Bailey is a 76 year old female who presents for:    Chief Complaint   Patient presents with     Oncology Clinic Visit     Initial Vitals: There were no vitals taken for this visit. Estimated body mass index is 26.71 kg/m  as calculated from the following:    Height as of 9/10/21: 1.6 m (5' 3\").    Weight as of 11/16/21: 68.4 kg (150 lb 12.8 oz). There is no height or weight on file to calculate BSA.  Data Unavailable Comment: Data Unavailable   No LMP recorded. Patient has had a hysterectomy.  Allergies reviewed: Yes  Medications reviewed: Yes    Medications: Medication refills not needed today.  Pharmacy name entered into [facility]:    [facility][id number] - [place], [place] - [address] AT [facility] OF [address]  [facility] - [place], [place] - [address] [facility] [id number] - [place], [place] - [address] [facility]    Clinical concerns:  doctor was notified.      Rosa Pennington, [facility]            "
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
## 2021-11-30 NOTE — PROGRESS NOTES
"Per Dr. Agudelo,   \"I just placed therapy orders for this patient's facility. She is a Neuro Onc patient who had been receiving PT at her facility- [facility] in [place], [place].   She has had a physical and cognitive decline and so I put orders in for both PT to work on mobility and speech therapy to work on cognition.   Could you please fax over the orders and also confirm that the facility has speech/cognitive rehabilitation available? If they don't, the family is willing to drive to outpatient appointments. But they would prefer it be at her facility if possible.\"      Writer Contacted ELEANOR Grier at [facility]. [facility] will arrange PT/Speech through [facility] health services. [facility] will update writer if unable to arrange. PT/Speech orders faxed to [facility].    Louise Jerry, JIANN, RN, PHN, OCN  Oncology Care Coordinator  [facility]      "
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
## 2021-12-02 NOTE — PROGRESS NOTES
RECORDS STATUS - ALL OTHER DIAGNOSIS      RECORDS RECEIVED FROM: [facility]   DATE RECEIVED: 12/10/2021   NOTES STATUS DETAILS   OFFICE NOTE from referring provider Complete Epic   Stephanie Baker MD   OFFICE NOTE from medical oncologist Complete 11/30/2021 Glioblastoma (H) (Primary)     11/16/2021 Glioblastoma     More in EPIC   DISCHARGE SUMMARY from hospital     DISCHARGE REPORT from the ER     OPERATIVE REPORT Complete 9/10/2021 PFT     MEDICATION LIST Complete [facility]   CLINICAL TRIAL TREATMENTS TO DATE     LABS     PATHOLOGY REPORTS Complete 2/23/2021  left parietal craniotomy for tumor resection   ANYTHING RELATED TO DIAGNOSIS Complete Labs last updated on 11/24/2021   GENONOMIC TESTING     TYPE:     IMAGING (NEED IMAGES & REPORT)     CT SCANS Complete CT Chest 9/10/2021   MRI Complete MRI Brain 11/16/2021, 8/17/2021 more in PACS   MAMMO     ULTRASOUND     PET

## 2021-12-03 ENCOUNTER — LAB (OUTPATIENT)
Dept: LAB | Facility: CLINIC | Age: 76
End: 2021-12-03
Payer: MEDICARE

## 2021-12-03 ENCOUNTER — OFFICE VISIT (OUTPATIENT)
Dept: PULMONOLOGY | Facility: CLINIC | Age: 76
End: 2021-12-03
Attending: INTERNAL MEDICINE
Payer: MEDICARE

## 2021-12-03 VITALS
WEIGHT: 151 LBS | BODY MASS INDEX: 25.16 KG/M2 | SYSTOLIC BLOOD PRESSURE: 131 MMHG | HEART RATE: 94 BPM | DIASTOLIC BLOOD PRESSURE: 84 MMHG | OXYGEN SATURATION: 94 % | HEIGHT: 65 IN

## 2021-12-03 DIAGNOSIS — J84.9 ILD (INTERSTITIAL LUNG DISEASE) (H): ICD-10-CM

## 2021-12-03 DIAGNOSIS — B59 PNEUMONIA OF BOTH LUNGS DUE TO PNEUMOCYSTIS JIROVECII, UNSPECIFIED PART OF LUNG (H): ICD-10-CM

## 2021-12-03 DIAGNOSIS — C71.9 GLIOBLASTOMA (H): ICD-10-CM

## 2021-12-03 LAB
BASOPHILS # BLD AUTO: 0 10E3/UL (ref 0–0.2)
BASOPHILS NFR BLD AUTO: 0 %
CK SERPL-CCNC: 42 U/L (ref 30–225)
CRP SERPL-MCNC: <2.9 MG/L (ref 0–8)
EOSINOPHIL # BLD AUTO: 0.3 10E3/UL (ref 0–0.7)
EOSINOPHIL NFR BLD AUTO: 5 %
ERYTHROCYTE [DISTWIDTH] IN BLOOD BY AUTOMATED COUNT: 13.4 % (ref 10–15)
ERYTHROCYTE [SEDIMENTATION RATE] IN BLOOD BY WESTERGREN METHOD: 18 MM/HR (ref 0–30)
HCT VFR BLD AUTO: 37.2 % (ref 35–47)
HGB BLD-MCNC: 12.3 G/DL (ref 11.7–15.7)
IMM GRANULOCYTES # BLD: 0 10E3/UL
IMM GRANULOCYTES NFR BLD: 0 %
LYMPHOCYTES # BLD AUTO: 0.2 10E3/UL (ref 0.8–5.3)
LYMPHOCYTES NFR BLD AUTO: 4 %
MCH RBC QN AUTO: 32.5 PG (ref 26.5–33)
MCHC RBC AUTO-ENTMCNC: 33.1 G/DL (ref 31.5–36.5)
MCV RBC AUTO: 98 FL (ref 78–100)
MONOCYTES # BLD AUTO: 0.5 10E3/UL (ref 0–1.3)
MONOCYTES NFR BLD AUTO: 9 %
NEUTROPHILS # BLD AUTO: 4.4 10E3/UL (ref 1.6–8.3)
NEUTROPHILS NFR BLD AUTO: 82 %
NRBC # BLD AUTO: 0 10E3/UL
NRBC BLD AUTO-RTO: 0 /100
PLATELET # BLD AUTO: 209 10E3/UL (ref 150–450)
RBC # BLD AUTO: 3.79 10E6/UL (ref 3.8–5.2)
WBC # BLD AUTO: 5.4 10E3/UL (ref 4–11)

## 2021-12-03 PROCEDURE — 85025 COMPLETE CBC W/AUTO DIFF WBC: CPT | Performed by: PATHOLOGY

## 2021-12-03 PROCEDURE — 86038 ANTINUCLEAR ANTIBODIES: CPT | Performed by: INTERNAL MEDICINE

## 2021-12-03 PROCEDURE — 86235 NUCLEAR ANTIGEN ANTIBODY: CPT | Performed by: INTERNAL MEDICINE

## 2021-12-03 PROCEDURE — 99214 OFFICE O/P EST MOD 30 MIN: CPT | Mod: GC | Performed by: INTERNAL MEDICINE

## 2021-12-03 PROCEDURE — 94729 DIFFUSING CAPACITY: CPT | Performed by: INTERNAL MEDICINE

## 2021-12-03 PROCEDURE — 86255 FLUORESCENT ANTIBODY SCREEN: CPT | Performed by: INTERNAL MEDICINE

## 2021-12-03 PROCEDURE — 94726 PLETHYSMOGRAPHY LUNG VOLUMES: CPT | Performed by: INTERNAL MEDICINE

## 2021-12-03 PROCEDURE — 36415 COLL VENOUS BLD VENIPUNCTURE: CPT | Performed by: PATHOLOGY

## 2021-12-03 PROCEDURE — 94060 EVALUATION OF WHEEZING: CPT | Performed by: INTERNAL MEDICINE

## 2021-12-03 PROCEDURE — 82787 IGG 1 2 3 OR 4 EACH: CPT | Performed by: INTERNAL MEDICINE

## 2021-12-03 PROCEDURE — 86200 CCP ANTIBODY: CPT | Performed by: INTERNAL MEDICINE

## 2021-12-03 PROCEDURE — G0463 HOSPITAL OUTPT CLINIC VISIT: HCPCS

## 2021-12-03 PROCEDURE — 86431 RHEUMATOID FACTOR QUANT: CPT | Performed by: INTERNAL MEDICINE

## 2021-12-03 RX ORDER — ALBUTEROL SULFATE 90 UG/1
2 AEROSOL, METERED RESPIRATORY (INHALATION) EVERY 4 HOURS PRN
Qty: 18 G | Refills: 3 | Status: ON HOLD | OUTPATIENT
Start: 2021-12-03 | End: 2024-05-05

## 2021-12-03 ASSESSMENT — MIFFLIN-ST. JEOR: SCORE: 1175.81

## 2021-12-03 NOTE — LETTER
12/3/2021     RE: Olga Bailey  [address] [address]    Dear Colleague,    Thank you for referring your patient, Olga Bailey, to the [facility] FOR LUNG [facility]ENCE AND [facility]. Please see a copy of my visit note below.    Pulmonology Clinic Follow up Visit       Olga Bailey MRN# [id number]   YOB: 1945 Age: 76 year old       Reason for Visit: follow up regarding dyspnea, abnormal CT findings          Assessment and Plan:     # Unspecified ILD  # Moderate restrictive lung disease with mild decreased diffusing capacity  # Resolving inflammatory versus infectious process  # Resolved acute hypoxemic respiratory failure     Olga Bailey is a 76 year old female with history of  left frontoparietal glioblastoma status post gross total resection on 2/23/2021, status post radiation therapy May 2021 and adjuvant therapy with temozolomide (complicated by significant hematologic toxicity); imaging from 8/2021 demonstrated positive treatment effect. Medical history also notable for HTN, GERD, asthma, anxiety, and depression.     Reassuringly, her hypoxia has resolved. She has a reduced 6MWT without evidence of hypoxia and her PFTs are consistent with moderate restriction with mild decrease in diffusing capacity. CT imaging findings most likely represent sequalae of prior infection or other acute inflammatory process that occurred during her prolonged hospitalization. Less likely to be primary interstitial process like NSIP or other ILD.     We will continue with plan of obtaining an ILD panel and with repeat PFTs now. If worsening PFTs, will repeat CT imaging with discussion at ILD Conference.    # Wheezing  # Occasional dyspnea on exertion  - refill for PRN albuterol provided     Patient staffed with Dr. Dale. Return to clinic pending results of lab testing and PFTs.    Cole Egan M.D.  Pulmonary and Critical Care Medicine  Fellow  12/3/2021        History of Present Illness / Interval History:     Briefly, Olga Bailey is a 76 year old female with history of  left frontoparietal glioblastoma status post gross total resection on 2/23/2021, status post radiation therapy May 2021 and adjuvant therapy with temozolomide (complicated by significant hematologic toxicity); imaging from 8/2021 demonstrated positive treatment effect. Medical history also notable for HTN, GERD, asthma, anxiety, and depression. 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 Initially it was felt she developed PJP pneumonia in the setting of Beta D-glucan & LDH both being elevated and apparent response to Bactrim therapy. Subsequently it seems she may have had TACO vs TRALI vs ALI from unknown inflammatory process. Steroids + diuretics were added to her regimen and she seems to have responded well to the these therapies. BAL not pursued given her clinical improvement. She completed three weeks of Bactrim with a prolonged prednisone taper. Please see notes from Pulmonary Clinic visit on 9/10/2021 for further details.    Since our last visit on 9/10/2021, she reports her breathing is overall stable. Occasionally gets dyspnea with wheezing; not associated with activity or other triggers; resolves after a few minutes. No associated symptoms. Denies any infectious symptoms.    Unfortunately, did not get any labs or PFTs done as previously ordered.    No new concerns today.    Treatment of her glioblastoma is going well. Recent MRI imaging shows no evidence of residual tumor.      Review of Systems:   A complete 10 point ROS was performed and negative except per history above.         Physical Examination:     /84   Pulse 94   Ht 1.651 m (5'  "5\")   Wt 68.5 kg (151 lb)   SpO2 94%   BMI 25.13 kg/m      Gen: alert, resting comfortably in chair, in no acute distress  CV: RRR, NMRG  Resp: Bilateral scattered inspiratory crackles, no wheezing, breathing comfortably on room air  Skin: no obvious rashes on gross evaluation  Extremities: no edema, WWP; lower extremities in compression stockings  Neuro: alert and appropriate, no focal abnormalities        Data:     Transthoracic echocardiogram 4/28/2021:  Interpretation Summary  Global and regional left ventricular function is hyperkinetic with an EF >70%.  Right ventricular function, chamber size, wall motion, and thickness are  normal.  No pericardial effusion is present.  Pulmonary artery systolic pressure is normal.     CTA chest 5/2/2021:  IMPRESSION:   1. Exam is negative for acute pulmonary embolism.     2. Extensive bilateral pulmonary infiltrates, increased from previous exam.     CTA chest 5/26/2021:  IMPRESSION:   1. No pulmonary embolism. No evidence of right heart strain.  2. Extensive interlobular septal thickening and reticulation with superimposed groundglass opacities, which have significantly improved  since 5/2/2021, likely resolving infectious/inflammatory process with possible superimposed pulmonary edema.  3. Trace right pleural effusion.     CT chest 9/10/2021:  IMPRESSION:   1. Bilateral subpleural reticular opacities and bronchiectasis with air trapping on expiratory images, which may represent an interstitial lung disease, such as hypersensitivity pneumonitis or NSIP versus sequela of prior PJP pneumonia. In addition, there is interlobular septal thickening, which may represent superimposed pulmonary edema.  2. Further decreased bilateral groundglass opacities compared to CT from 5/26/2021 and 5/2/2021, likely resolving infection. No new focal airspace opacity.     Pulmonary function tests 9/10/2021:   IMPRESSION:   - Six minute walk distance is reduced indicating a decrease in " exercise tolerance.     - There is no significant oxygen desaturation during the six minute walk on room air.     - Moderate restriction.     - Mild diffusion defect.     - No previous studies available for comparison.        Medications:     Current Outpatient Medications   Medication     acetaminophen (TYLENOL) 325 MG tablet     albuterol (PROAIR HFA/PROVENTIL HFA/VENTOLIN HFA) 108 (90 Base) MCG/ACT inhaler     albuterol (PROVENTIL) (2.5 MG/3ML) 0.083% neb solution     amLODIPine (NORVASC) 5 MG tablet     buPROPion (WELLBUTRIN XL) 150 MG 24 hr tablet     busPIRone HCl (BUSPAR) 30 MG tablet     calcium polycarbophil (FIBERCON) 625 MG tablet     docusate sodium (COLACE) 100 MG capsule     famotidine (PEPCID) 20 MG tablet     FLUoxetine (PROZAC) 20 MG capsule     furosemide (LASIX) 40 MG tablet     levETIRAcetam (KEPPRA) 1000 MG tablet     levETIRAcetam (KEPPRA) 250 MG tablet     melatonin 3 MG tablet     Nutritional Supplements (ENSURE CLEAR) LIQD     ondansetron (ZOFRAN) 4 MG tablet     pantoprazole (PROTONIX) 40 MG EC tablet     polyethylene glycol (MIRALAX) 17 GM/Dose powder     prochlorperazine (COMPAZINE) 10 MG tablet     QUEtiapine (SEROQUEL) 50 MG tablet     rivaroxaban ANTICOAGULANT (XARELTO ANTICOAGULANT) 20 MG TABS tablet     sennosides (SENOKOT) 8.6 MG tablet     sulfamethoxazole-trimethoprim (BACTRIM DS) 800-160 MG tablet     temozolomide (TEMODAR) 100 MG capsule     LINZESS 290 MCG capsule     potassium chloride ER (KLOR-CON M) 20 MEQ CR tablet     No current facility-administered medications for this visit.      Attestation signed by Delano Dale MD at 12/3/2021  4:28 PM:    Faculty Addendum:  This patient has been seen and evaluated by me.  I have discussed care with Dr. Egan and agree with the findings and plan in this note.    Delano Dale MD  Pulmonary/Critical Care  December 3, 2021 4:27 PM

## 2021-12-03 NOTE — PROGRESS NOTES
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 Medical history also notable for HTN, GERD, asthma, anxiety, and depression.     Reassuringly, her hypoxia has resolved. She has a reduced 6MWT without evidence of hypoxia and her PFTs are consistent with moderate restriction with mild decrease in diffusing capacity. CT imaging findings most likely represent sequalae of prior infection or other acute inflammatory process that occurred during her prolonged hospitalization. Less likely to be primary interstitial process like NSIP or other ILD.     We will continue with plan of obtaining an ILD panel and with repeat PFTs now. If worsening PFTs, will repeat CT imaging with discussion at ILD Conference.    # Wheezing  # Occasional dyspnea on exertion  - refill for PRN albuterol provided     Patient staffed with Dr. Dale. Return to clinic pending results of lab testing and PFTs.    Cole Egan M.D.  Pulmonary and Critical Care Medicine Fellow  12/3/2021        History of Present Illness / Interval History:     Briefly, Olga Bailey is a 76 year old female with history of  left frontoparietal glioblastoma status post gross total resection on 2/23/2021, status post radiation therapy May 2021 and adjuvant therapy with  "temozolomide (complicated by significant hematologic toxicity); imaging from 8/2021 demonstrated positive treatment effect. Medical history also notable for HTN, GERD, asthma, anxiety, and depression. 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 Initially it was felt she developed PJP pneumonia in the setting of Beta D-glucan & LDH both being elevated and apparent response to Bactrim therapy. Subsequently it seems she may have had TACO vs TRALI vs ALI from unknown inflammatory process. Steroids + diuretics were added to her regimen and she seems to have responded well to the these therapies. BAL not pursued given her clinical improvement. She completed three weeks of Bactrim with a prolonged prednisone taper. Please see notes from Pulmonary Clinic visit on 9/10/2021 for further details.    Since our last visit on 9/10/2021, she reports her breathing is overall stable. Occasionally gets dyspnea with wheezing; not associated with activity or other triggers; resolves after a few minutes. No associated symptoms. Denies any infectious symptoms.    Unfortunately, did not get any labs or PFTs done as previously ordered.    No new concerns today.    Treatment of her glioblastoma is going well. Recent MRI imaging shows no evidence of residual tumor.      Review of Systems:   A complete 10 point ROS was performed and negative except per history above.         Physical Examination:     /84   Pulse 94   Ht 1.651 m (5' 5\")   Wt 68.5 kg (151 lb)   SpO2 94%   BMI 25.13 kg/m      Gen: alert, resting comfortably in chair, in no acute distress  CV: RRR, NMRG  Resp: Bilateral scattered inspiratory crackles, no wheezing, breathing comfortably on room air  Skin: no obvious rashes on gross " evaluation  Extremities: no edema, WWP; lower extremities in compression stockings  Neuro: alert and appropriate, no focal abnormalities        Data:     Transthoracic echocardiogram 4/28/2021:  Interpretation Summary  Global and regional left ventricular function is hyperkinetic with an EF >70%.  Right ventricular function, chamber size, wall motion, and thickness are  normal.  No pericardial effusion is present.  Pulmonary artery systolic pressure is normal.     CTA chest 5/2/2021:  IMPRESSION:   1. Exam is negative for acute pulmonary embolism.     2. Extensive bilateral pulmonary infiltrates, increased from previous exam.     CTA chest 5/26/2021:  IMPRESSION:   1. No pulmonary embolism. No evidence of right heart strain.  2. Extensive interlobular septal thickening and reticulation with superimposed groundglass opacities, which have significantly improved  since 5/2/2021, likely resolving infectious/inflammatory process with possible superimposed pulmonary edema.  3. Trace right pleural effusion.     CT chest 9/10/2021:  IMPRESSION:   1. Bilateral subpleural reticular opacities and bronchiectasis with air trapping on expiratory images, which may represent an interstitial lung disease, such as hypersensitivity pneumonitis or NSIP versus sequela of prior PJP pneumonia. In addition, there is interlobular septal thickening, which may represent superimposed pulmonary edema.  2. Further decreased bilateral groundglass opacities compared to CT from 5/26/2021 and 5/2/2021, likely resolving infection. No new focal airspace opacity.     Pulmonary function tests 9/10/2021:   IMPRESSION:   - Six minute walk distance is reduced indicating a decrease in exercise tolerance.     - There is no significant oxygen desaturation during the six minute walk on room air.     - Moderate restriction.     - Mild diffusion defect.     - No previous studies available for comparison.        Medications:     Current Outpatient Medications    Medication     acetaminophen (TYLENOL) 325 MG tablet     albuterol (PROAIR HFA/PROVENTIL HFA/VENTOLIN HFA) 108 (90 Base) MCG/ACT inhaler     albuterol (PROVENTIL) (2.5 MG/3ML) 0.083% neb solution     amLODIPine (NORVASC) 5 MG tablet     buPROPion (WELLBUTRIN XL) 150 MG 24 hr tablet     busPIRone HCl (BUSPAR) 30 MG tablet     calcium polycarbophil (FIBERCON) 625 MG tablet     docusate sodium (COLACE) 100 MG capsule     famotidine (PEPCID) 20 MG tablet     FLUoxetine (PROZAC) 20 MG capsule     furosemide (LASIX) 40 MG tablet     levETIRAcetam (KEPPRA) 1000 MG tablet     levETIRAcetam (KEPPRA) 250 MG tablet     melatonin 3 MG tablet     Nutritional Supplements (ENSURE CLEAR) LIQD     ondansetron (ZOFRAN) 4 MG tablet     pantoprazole (PROTONIX) 40 MG EC tablet     polyethylene glycol (MIRALAX) 17 GM/Dose powder     prochlorperazine (COMPAZINE) 10 MG tablet     QUEtiapine (SEROQUEL) 50 MG tablet     rivaroxaban ANTICOAGULANT (XARELTO ANTICOAGULANT) 20 MG TABS tablet     sennosides (SENOKOT) 8.6 MG tablet     sulfamethoxazole-trimethoprim (BACTRIM DS) 800-160 MG tablet     temozolomide (TEMODAR) 100 MG capsule     LINZESS 290 MCG capsule     potassium chloride ER (KLOR-CON M) 20 MEQ CR tablet     No current facility-administered medications for this visit.

## 2021-12-04 LAB — ALDOLASE SERPL-CCNC: 2.6 U/L

## 2021-12-06 LAB
ANA SER QL IF: NEGATIVE
ANCA AB PATTERN SER IF-IMP: NORMAL
C-ANCA TITR SER IF: NORMAL {TITER}
CCP AB SER IA-ACNC: 1.9 U/ML
DLCOCOR-%PRED-PRE: 67 %
DLCOCOR-PRE: 12.42 ML/MIN/MMHG
DLCOUNC-%PRED-PRE: 65 %
DLCOUNC-PRE: 11.98 ML/MIN/MMHG
DLCOUNC-PRED: 18.35 ML/MIN/MMHG
ENA SS-A AB SER IA-ACNC: <0.5 U/ML
ENA SS-A AB SER IA-ACNC: NEGATIVE
ENA SS-B IGG SER IA-ACNC: <0.6 U/ML
ENA SS-B IGG SER IA-ACNC: NEGATIVE
ERV-%PRED-PRE: 69 %
ERV-PRE: 0.43 L
ERV-PRED: 0.62 L
EXPTIME-PRE: 6.07 SEC
FEF2575-%PRED-POST: 167 %
FEF2575-%PRED-PRE: 147 %
FEF2575-POST: 2.73 L/SEC
FEF2575-PRE: 2.41 L/SEC
FEF2575-PRED: 1.63 L/SEC
FEFMAX-%PRED-PRE: 91 %
FEFMAX-PRE: 4.57 L/SEC
FEFMAX-PRED: 4.99 L/SEC
FEV1-%PRED-PRE: 70 %
FEV1-PRE: 1.39 L
FEV1FEV6-PRE: 90 %
FEV1FEV6-PRED: 78 %
FEV1FVC-PRE: 90 %
FEV1FVC-PRED: 78 %
FEV1SVC-PRE: 91 %
FEV1SVC-PRED: 70 %
FIFMAX-PRE: 3.36 L/SEC
FRCPLETH-%PRED-PRE: 62 %
FRCPLETH-PRE: 1.67 L
FRCPLETH-PRED: 2.65 L
FVC-%PRED-PRE: 60 %
FVC-PRE: 1.55 L
FVC-PRED: 2.56 L
IC-%PRED-PRE: 50 %
IC-PRE: 1.09 L
IC-PRED: 2.18 L
RHEUMATOID FACT SER NEPH-ACNC: 8 IU/ML
RVPLETH-%PRED-PRE: 58 %
RVPLETH-PRE: 1.24 L
RVPLETH-PRED: 2.11 L
TLCPLETH-%PRED-PRE: 58 %
TLCPLETH-PRE: 2.76 L
TLCPLETH-PRED: 4.75 L
VA-%PRED-PRE: 58 %
VA-PRE: 2.58 L
VC-%PRED-PRE: 54 %
VC-PRE: 1.52 L
VC-PRED: 2.8 L

## 2021-12-07 LAB
ENA JO1 AB SER IA-ACNC: <0.5 U/ML
ENA JO1 IGG SER-ACNC: NEGATIVE
IGG SERPL-MCNC: 622 MG/DL (ref 610–1616)
IGG1 SER-MCNC: 438 MG/DL (ref 382–929)
IGG2 SER-MCNC: 170 MG/DL (ref 242–700)
IGG3 SER-MCNC: 16 MG/DL (ref 22–176)
IGG4 SER-MCNC: 14 MG/DL (ref 4–86)
SUBCLASSES, PERCENT: 103 %

## 2021-12-08 ENCOUNTER — LAB REQUISITION (OUTPATIENT)
Dept: LAB | Facility: CLINIC | Age: 76
End: 2021-12-08
Payer: MEDICARE

## 2021-12-08 ENCOUNTER — DOCUMENTATION ONLY (OUTPATIENT)
Dept: PHARMACY | Facility: CLINIC | Age: 76
End: 2021-12-08
Payer: MEDICARE

## 2021-12-08 DIAGNOSIS — I10 ESSENTIAL (PRIMARY) HYPERTENSION: ICD-10-CM

## 2021-12-08 DIAGNOSIS — D61.810 ANTINEOPLASTIC CHEMOTHERAPY INDUCED PANCYTOPENIA (CODE) (H): ICD-10-CM

## 2021-12-08 DIAGNOSIS — E87.6 HYPOKALEMIA: ICD-10-CM

## 2021-12-08 LAB
A FLAVUS AB SER QL ID: NORMAL
A FUMIGATUS1 AB SER QL ID: NORMAL
A FUMIGATUS2 AB SER QL ID: NORMAL
A FUMIGATUS3 AB SER QL ID: NORMAL
A FUMIGATUS6 AB SER QL ID: NORMAL
A PULLULANS AB SER QL ID: NORMAL
PIGEON SERUM AB QL ID: NORMAL
S RECTIVIRGULA AB SER QL ID: NORMAL
S VIRIDIS AB SER QL ID: NORMAL
T CANDIDUS AB SER QL: NORMAL
T VULGARIS1 AB SER QL ID: NORMAL

## 2021-12-08 NOTE — PROGRESS NOTES
Olga is a 76 year old who is being evaluated via a billable video visit.  Currently in [place] of [place].    How would you like to obtain your AVS? MyChart  If the video visit is dropped, the invitation should be resent by: Text to cell phone: [phone]  Will anyone else be joining your video visit? Yes: Sheridan daughter in law. How would they like to receive their invitation? Other e-mail: n/a   Angelica Serrato      Palliative Care Progress Note    Patient Name: Olga Bailey  Primary Provider: Clinic, [facility]    Chief Complaint/Patient ID: 75 yo F with dx of glioblastoma s/p resection 02/2021 with prior depression/anxiety, HTN, asthma, GERD.Treatment course c/b dxs of PJP pneumonia, B/l DVTs, & retroperitoneal bleed in 03/2021 resulting in prolonged hospital stay. Completed radiation May 2021. Started chemo but had toxicity, changed to metronomic dosing of temodar in July. Most recent imaging with positive response to therapy.    -Mood worsened secondary to coping with long stay- has struggled with some symptoms of PTSD.    Last Palliative care appointment: Seen inpatient at [facility] in May. Last note by Dr. Alfredo on 5/3/21.     Reviewed: Yes, no concerns.    Interim History:  Olga Bailey 76 year old female is seen today for follow up with Palliative Care via billable video visit. DIL Is present and provides additional history for the visit.     She reports significant low appetite.  The thought of making herself eat is very difficult and she has lost some weight.  Notes that her nausea is improved. Oncology notes mention zyprexa for multiple symptoms.     Was struggling with constipation but less so now. Getting docusate and senna PRN. Now going nearly every day.    Her biggest concern is her mood. Feels she has a low level of dysthymia. Not overly depressed but does reports decreased motivation.  She has some PTSD from prolonged hospital stay and is not receiving adequate mental health support at  "current care facility. On prozac 60mg daily and buspar 30mg BID. Wellbutrin was stopped during hospitalization due to lower seizure threshold. On seroquel 50mg in the evenings for anxiety/agitation/sundowning. She doesn't remember when Seroquel was started. Usually doesn't have trouble falling asleep now.     \"So much was taken from me all at once\".  Prior to getting sick, she was very independent, was still working part-time, and was taking care of her grandchildren then it \"all came crashing down\".  She really struggled with the prolonged hospital stay, and during that time, her life revolved around the hospital inpatient care.  Now she is struggling to look after herself again and says it is difficult to \"fit back into life\".      She is mostly independent with her day-to-day hygiene.  Nursing staff at her current facility help with medication administration and meal preparation.     Social History:  Born in [place], lived in [place] from age 4yrs to her 20s, then moved to . 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   Social History     Tobacco Use     Smoking status: Never Smoker     Smokeless tobacco: Never Used   Substance Use Topics     Alcohol use: Yes     Comment: very rare     Drug use: No       Family History- Reviewed in Epic.    Allergies   Allergen Reactions     Bacitracin Rash     Bactroban is effective; No difficulties     Erythromycin      intolerant.     Meperidine Hcl      intolerant only   Demerol     Chloraprep One Step Rash       Advanced Care Planning: HCD on file, names daughter as HCA. POLST on file states full code.    Medications- Reviewed in Epic.    Past Medical History- Reviewed in [facility].    Past Surgical History- Reviewed in Epic.    Review of Systems:   ROS: 10 point ROS neg other than the symptoms noted above in the HPI.      Physical Exam:   Constitutional: Alert, pleasant, no apparent distress. Sitting up in chair.  Eyes: Sclera non-icteric, no eye " discharge.  ENT: No nasal discharge. Ears grossly normal.  Respiratory: Unlabored respirations. Speaking in full sentences.  Musculoskeletal: Extremities appear normal- no gross deformities noted. No edema noted on upper body.   Skin: No suspicious lesions or rashes on visible skin.  Neurologic: Clear speech, no aphasia. No facial droop.  Psychiatric: Mentation appears normal, appropriate attention. Affect normal/bright. Does not appear anxious or depressed.      Key Data Reviewed:  LABS: 11/24/21- Cr 0.85, GFR 67, Albumin 3.1, LFTs wnl.   12/3/21- WBC 5.4, Hgb 12.3, Plts 209.     IMAGING: Brain MRI 11/16/21- IMPRESSION:  1. Stable postoperative changes of left parietal craniotomy and tumor resection.  2. Small areas of enhancement along the margins of the resection cavity are unchanged, presumably representing posttreatment change.  3. Nonspecific confluent T2 hyperintense signal throughout the cerebral hemispheres and ashwin.  4. Incidental small dural based mass along the right occipital lobe probably representing incidental meningioma, unchanged.  5. 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    Impression & Recommendations & Counseling:  lOga Bailey is a 76 year old female with history of glioblastoma, anxiety, and depression.    Mood  Coping - Overall lower mood, struggling with coping with all the physical and mental changes that occurred as part of her illness and treatment journey.  - Continue Prozac 60mg daily and buspar 30mg BID.  - Referral placed to [facility] for coping support  - Consider low dose abilify in the future if needed.    Appetite  Nausea - Very poor appetite, struggling to keep weight up.  - Trial of zyprexa 5mg at bedtime.    Sleep  Anxiety - Mood medications as above, also on seroquel for increased agitation at night and help with sleep. Will stop seroquel with starting zyprexa.  - Stop Seroquel.  - Starting  zyprexea 5mg at bedtime, and additional 2.5-5mg daily as needed for breakthrough symptoms.      Follow up: 2 months    Video-Visit Details  Video Start Time: 12:39PM  Video End Time: 1:01PM  Video cut out, additional 27 mins of visit performed over the phone.    Originating Location (pt. Location): Home     Distant Location (provider location):  [facility]     Platform used for Video Visit: [employer]     Total time spent on day of encounter is 69 mins, including reviewing record, review of above studies, above visit with patient, symptomatic discussion of mood, sleep, and poor appetite, including medication adjustments/prescription management, and documentation.     Maggi Plaza,   Palliative Medicine   Pager [phone], AMCOM ID [id number]    Some chart documentation performed using Dragon Voice recognition Software. Although reviewed after completion, some words and grammatical errors may remain.

## 2021-12-08 NOTE — PROGRESS NOTES
Oral Chemotherapy Monitoring Program.    Patient currently on metronomic temozolomide therapy.    Reviewed labs from 12/3/21. No concerning abnormalities. Ok to proceed with temozolomide. Of note, patient will be finishing up 6 month course of metronomic temozolomide soon.    F/U on plan moving forward after appt with Dr. Baker on 12/21    Brigid HansenD  December 8, 2021

## 2021-12-09 LAB
ALBUMIN SERPL-MCNC: 4.1 G/DL (ref 3.5–5)
ALP SERPL-CCNC: 101 U/L (ref 45–120)
ALT SERPL W P-5'-P-CCNC: 15 U/L (ref 0–45)
ANION GAP SERPL CALCULATED.3IONS-SCNC: 12 MMOL/L (ref 5–18)
AST SERPL W P-5'-P-CCNC: 14 U/L (ref 0–40)
BASOPHILS # BLD AUTO: 0 10E3/UL (ref 0–0.2)
BASOPHILS NFR BLD AUTO: 1 %
BILIRUB SERPL-MCNC: 0.5 MG/DL (ref 0–1)
BUN SERPL-MCNC: 17 MG/DL (ref 8–28)
CALCIUM SERPL-MCNC: 9.5 MG/DL (ref 8.5–10.5)
CHLORIDE BLD-SCNC: 103 MMOL/L (ref 98–107)
CO2 SERPL-SCNC: 25 MMOL/L (ref 22–31)
CREAT SERPL-MCNC: 1.15 MG/DL (ref 0.6–1.1)
EOSINOPHIL # BLD AUTO: 0.3 10E3/UL (ref 0–0.7)
EOSINOPHIL NFR BLD AUTO: 4 %
ERYTHROCYTE [DISTWIDTH] IN BLOOD BY AUTOMATED COUNT: 13.2 % (ref 10–15)
GFR SERPL CREATININE-BSD FRML MDRD: 46 ML/MIN/1.73M2
GLUCOSE BLD-MCNC: 102 MG/DL (ref 70–125)
HCT VFR BLD AUTO: 36.7 % (ref 35–47)
HGB BLD-MCNC: 11.7 G/DL (ref 11.7–15.7)
IMM GRANULOCYTES # BLD: 0 10E3/UL
IMM GRANULOCYTES NFR BLD: 0 %
LYMPHOCYTES # BLD AUTO: 0.2 10E3/UL (ref 0.8–5.3)
LYMPHOCYTES NFR BLD AUTO: 3 %
MCH RBC QN AUTO: 31.8 PG (ref 26.5–33)
MCHC RBC AUTO-ENTMCNC: 31.9 G/DL (ref 31.5–36.5)
MCV RBC AUTO: 100 FL (ref 78–100)
MONOCYTES # BLD AUTO: 0.6 10E3/UL (ref 0–1.3)
MONOCYTES NFR BLD AUTO: 10 %
NEUTROPHILS # BLD AUTO: 4.9 10E3/UL (ref 1.6–8.3)
NEUTROPHILS NFR BLD AUTO: 82 %
NRBC # BLD AUTO: 0 10E3/UL
NRBC BLD AUTO-RTO: 0 /100
PLATELET # BLD AUTO: 220 10E3/UL (ref 150–450)
POTASSIUM BLD-SCNC: 3.9 MMOL/L (ref 3.5–5)
PROT SERPL-MCNC: 6.5 G/DL (ref 6–8)
RBC # BLD AUTO: 3.68 10E6/UL (ref 3.8–5.2)
SODIUM SERPL-SCNC: 140 MMOL/L (ref 136–145)
WBC # BLD AUTO: 5.9 10E3/UL (ref 4–11)

## 2021-12-09 PROCEDURE — 85025 COMPLETE CBC W/AUTO DIFF WBC: CPT | Mod: ORL

## 2021-12-09 PROCEDURE — 36415 COLL VENOUS BLD VENIPUNCTURE: CPT | Mod: ORL

## 2021-12-09 PROCEDURE — 80053 COMPREHEN METABOLIC PANEL: CPT | Mod: ORL

## 2021-12-09 PROCEDURE — P9603 ONE-WAY ALLOW PRORATED MILES: HCPCS | Mod: ORL

## 2021-12-10 ENCOUNTER — VIRTUAL VISIT (OUTPATIENT)
Dept: ONCOLOGY | Facility: CLINIC | Age: 76
End: 2021-12-10
Attending: STUDENT IN AN ORGANIZED HEALTH CARE EDUCATION/TRAINING PROGRAM
Payer: MEDICARE

## 2021-12-10 ENCOUNTER — PRE VISIT (OUTPATIENT)
Dept: ONCOLOGY | Facility: CLINIC | Age: 76
End: 2021-12-10

## 2021-12-10 DIAGNOSIS — R45.89 DIFFICULTY COPING: ICD-10-CM

## 2021-12-10 DIAGNOSIS — R11.0 NAUSEA: Primary | ICD-10-CM

## 2021-12-10 DIAGNOSIS — Z51.5 ENCOUNTER FOR PALLIATIVE CARE: ICD-10-CM

## 2021-12-10 DIAGNOSIS — F41.9 ANXIETY: ICD-10-CM

## 2021-12-10 DIAGNOSIS — F43.10 PTSD (POST-TRAUMATIC STRESS DISORDER): ICD-10-CM

## 2021-12-10 DIAGNOSIS — G47.9 DIFFICULTY SLEEPING: ICD-10-CM

## 2021-12-10 DIAGNOSIS — C71.9 GLIOBLASTOMA (H): ICD-10-CM

## 2021-12-10 DIAGNOSIS — R45.89 DEPRESSED MOOD: ICD-10-CM

## 2021-12-10 DIAGNOSIS — R63.0 ANOREXIA: ICD-10-CM

## 2021-12-10 LAB
ENA SCL70 IGG SER IA-ACNC: <0.6 U/ML
ENA SCL70 IGG SER IA-ACNC: NEGATIVE

## 2021-12-10 PROCEDURE — 99205 OFFICE O/P NEW HI 60 MIN: CPT | Mod: 95 | Performed by: STUDENT IN AN ORGANIZED HEALTH CARE EDUCATION/TRAINING PROGRAM

## 2021-12-10 PROCEDURE — G0463 HOSPITAL OUTPT CLINIC VISIT: HCPCS | Mod: PN,RTG | Performed by: STUDENT IN AN ORGANIZED HEALTH CARE EDUCATION/TRAINING PROGRAM

## 2021-12-10 RX ORDER — OLANZAPINE 5 MG/1
TABLET ORAL
Qty: 60 TABLET | Refills: 1 | Status: SHIPPED | OUTPATIENT
Start: 2021-12-10 | End: 2022-01-05

## 2021-12-10 NOTE — LETTER
12/10/2021         RE: Olga Bailey  [address] [address]        Dear Colleague,    Thank you for referring your patient, Olga Bailey, to the [facility]. Please see a copy of my visit note below.    Olga is a 76 year old who is being evaluated via a billable video visit.  [place] in [place]    How would you like to obtain your AVS? MyChart  If the video visit is dropped, the invitation should be resent by: Text to cell phone: [phone]  Will anyone else be joining your video visit? Yes: Sheridan daughter in law. How would they like to receive their invitation? Other e-mail: n/a   Angelica Serrato      Palliative Care Progress Note    Patient Name: Olga Bailey  Primary Provider: Edd, [facility] Swann    Chief Complaint/Patient ID: 77 yo F with dx of glioblastoma s/p resection 02/2021 with prior depression/anxiety, HTN, asthma, GERD.Treatment course c/b dxs of PJP pneumonia, B/l DVTs, & retroperitoneal bleed in 03/2021 resulting in prolonged hospital stay. Completed radiation May 2021. Started chemo but had toxicity, changed to metronomic dosing of temodar in July. Most recent imaging with positive response to therapy.    -Mood worsened secondary to coping with long stay- has struggled with some symptoms of PTSD.    Last Palliative care appointment: Seen inpatient at [facility] in May. Last note by Dr. Alfredo on 5/3/21.     Reviewed: Yes, no concerns.    Interim History:  Olga Bailey 76 year old female is seen today for follow up with Palliative Care via billable video visit. DIL Is present and provides additional history for the visit.     She reports significant low appetite.  The thought of making herself eat is very difficult and she has lost some weight.  Notes that her nausea is improved. Oncology notes mention zyprexa for multiple symptoms.     Was struggling with constipation but less so now. Getting docusate and senna PRN. Now  "going nearly every day.    Her biggest concern is her mood. Feels she has a low level of dysthymia. Not overly depressed but does reports decreased motivation.  She has some PTSD from prolonged hospital stay and is not receiving adequate mental health support at current care facility. On prozac 60mg daily and buspar 30mg BID. Wellbutrin was stopped during hospitalization due to lower seizure threshold. On seroquel 50mg in the evenings for anxiety/agitation/sundowning. She doesn't remember when Seroquel was started. Usually doesn't have trouble falling asleep now.     \"So much was taken from me all at once\".  Prior to getting sick, she was very independent, was still working part-time, and was taking care of her grandchildren then it \"all came crashing down\".  She really struggled with the prolonged hospital stay, and during that time, her life revolved around the hospital inpatient care.  Now she is struggling to look after herself again and says it is difficult to \"fit back into life\".      She is mostly independent with her day-to-day hygiene.  Nursing staff at her current facility help with medication administration and meal preparation.     Social History:  Born in [place], lived in [place] from age 4yrs to her 20s, then moved to [place]. 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   Social History     Tobacco Use     Smoking status: Never Smoker     Smokeless tobacco: Never Used   Substance Use Topics     Alcohol use: Yes     Comment: very rare     Drug use: No       Family History- Reviewed in Epic.    Allergies   Allergen Reactions     Bacitracin Rash     Bactroban is effective; No difficulties     Erythromycin      intolerant.     Meperidine Hcl      intolerant only   Demerol     Chloraprep One Step Rash       Advanced Care Planning: HCD on file, names daughter as HCA. POLST on file states full code.    Medications- Reviewed in Epic.    Past Medical History- Reviewed in Epic.    Past " Surgical History- Reviewed in Epic.    Review of Systems:   ROS: 10 point ROS neg other than the symptoms noted above in the HPI.      Physical Exam:   Constitutional: Alert, pleasant, no apparent distress. Sitting up in chair.  Eyes: Sclera non-icteric, no eye discharge.  ENT: No nasal discharge. Ears grossly normal.  Respiratory: Unlabored respirations. Speaking in full sentences.  Musculoskeletal: Extremities appear normal- no gross deformities noted. No edema noted on upper body.   Skin: No suspicious lesions or rashes on visible skin.  Neurologic: Clear speech, no aphasia. No facial droop.  Psychiatric: Mentation appears normal, appropriate attention. Affect normal/bright. Does not appear anxious or depressed.      Key Data Reviewed:  LABS: 11/24/21- Cr 0.85, GFR 67, Albumin 3.1, LFTs wnl.   12/3/21- WBC 5.4, Hgb 12.3, Plts 209.     IMAGING: Brain MRI 11/16/21- IMPRESSION:  1. Stable postoperative changes of left parietal craniotomy and tumor resection.  2. Small areas of enhancement along the margins of the resection cavity are unchanged, presumably representing posttreatment change.  3. Nonspecific confluent T2 hyperintense signal throughout the cerebral hemispheres and ashwin.  4. Incidental small dural based mass along the right occipital lobe probably representing incidental meningioma, unchanged.  5. 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    Impression & Recommendations & Counseling:  Olga Bailey is a 76 year old female with history of glioblastoma, anxiety, and depression.    Mood  Coping - Overall lower mood, struggling with coping with all the physical and mental changes that occurred as part of her illness and treatment journey.  - Continue Prozac 60mg daily and buspar 30mg BID.  - Referral placed to [facility] for coping support  - Consider low dose abilify in the future if needed.    Appetite  Nausea - Very poor  appetite, struggling to keep weight up.  - Trial of zyprexa 5mg at bedtime.    Sleep  Anxiety - Mood medications as above, also on seroquel for increased agitation at night and help with sleep. Will stop seroquel with starting zyprexa.  - Stop Seroquel.  - Starting zyprexea 5mg at bedtime, and additional 2.5-5mg daily as needed for breakthrough symptoms.      Follow up: 2 months    Video-Visit Details  Video Start Time: 12:39PM  Video End Time: 1:01PM  Video cut out, additional 27 mins of visit performed over the phone.    Originating Location (pt. Location): Home     Distant Location (provider location):  [facility]     Platform used for Video Visit: [employer]     Total time spent on day of encounter is 69 mins, including reviewing record, review of above studies, above visit with patient, symptomatic discussion of mood, sleep, and poor appetite, including medication adjustments/prescription management, and documentation.     Maggi Plaza DO  Palliative Medicine   Pager [phone], AMCOM ID [id number]    Some chart documentation performed using Dragon Voice recognition Software. Although reviewed after completion, some words and grammatical errors may remain.        Again, thank you for allowing me to participate in the care of your patient.        Sincerely,        Maggi Plaza DO

## 2021-12-10 NOTE — LETTER
12/10/2021         RE: Olga Bailey  [address] [facility] [address]        Dear Colleague,    Thank you for referring your patient, Olga Bailey, to the [facility]. Please see a copy of my visit note below.    Olga is a 76 year old who is being evaluated via a billable video visit.  Currently in State of [place].    How would you like to obtain your AVS? MyChart  If the video visit is dropped, the invitation should be resent by: Text to cell phone: [phone]  Will anyone else be joining your video visit? Yes: [place] daughter in law. How would they like to receive their invitation? Other e-mail: n/a   Angelica Serrato      Palliative Care Progress Note    Patient Name: Olga Bailey  Primary Provider: Edd, Enrique Swann    Chief Complaint/Patient ID: 75 yo F with dx of glioblastoma s/p resection 02/2021 with prior depression/anxiety, HTN, asthma, GERD.Treatment course c/b dxs of PJP pneumonia, B/l DVTs, & retroperitoneal bleed in 03/2021 resulting in prolonged hospital stay. Completed radiation May 2021. Started chemo but had toxicity, changed to metronomic dosing of temodar in July. Most recent imaging with positive response to therapy.    -Mood worsened secondary to coping with long stay- has struggled with some symptoms of PTSD.    Last Palliative care appointment: Seen inpatient at [facility] in May. Last note by Dr. Alfredo on 5/3/21.     Reviewed: Yes, no concerns.    Interim History:  Olga Bailey 76 year old female is seen today for follow up with Palliative Care via billable video visit. DIL Is present and provides additional history for the visit.     She reports significant low appetite.  The thought of making herself eat is very difficult and she has lost some weight.  Notes that her nausea is improved. Oncology notes mention zyprexa for multiple symptoms.     Was struggling with constipation but less so now. Getting docusate and senna PRN. Now  "going nearly every day.    Her biggest concern is her mood. Feels she has a low level of dysthymia. Not overly depressed but does reports decreased motivation.  She has some PTSD from prolonged hospital stay and is not receiving adequate mental health support at current care facility. On prozac 60mg daily and buspar 30mg BID. Wellbutrin was stopped during hospitalization due to lower seizure threshold. On seroquel 50mg in the evenings for anxiety/agitation/sundowning. She doesn't remember when Seroquel was started. Usually doesn't have trouble falling asleep now.     \"So much was taken from me all at once\".  Prior to getting sick, she was very independent, was still working part-time, and was taking care of her grandchildren then it \"all came crashing down\".  She really struggled with the prolonged hospital stay, and during that time, her life revolved around the hospital inpatient care.  Now she is struggling to look after herself again and says it is difficult to \"fit back into life\".      She is mostly independent with her day-to-day hygiene.  Nursing staff at her current facility help with medication administration and meal preparation.     Social History:  Born in [place], lived in [place] from age 4yrs to her 20s, then moved to [place]. 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   Social History     Tobacco Use     Smoking status: Never Smoker     Smokeless tobacco: Never Used   Substance Use Topics     Alcohol use: Yes     Comment: very rare     Drug use: No       Family History- Reviewed in Epic.    Allergies   Allergen Reactions     Bacitracin Rash     Bactroban is effective; No difficulties     Erythromycin      intolerant.     Meperidine Hcl      intolerant only   Demerol     Chloraprep One Step Rash       Advanced Care Planning: HCD on file, names daughter as HCA. POLST on file states full code.    Medications- Reviewed in Epic.    Past Medical History- Reviewed in Epic.    Past " Surgical History- Reviewed in Epic.    Review of Systems:   ROS: 10 point ROS neg other than the symptoms noted above in the HPI.      Physical Exam:   Constitutional: Alert, pleasant, no apparent distress. Sitting up in chair.  Eyes: Sclera non-icteric, no eye discharge.  ENT: No nasal discharge. Ears grossly normal.  Respiratory: Unlabored respirations. Speaking in full sentences.  Musculoskeletal: Extremities appear normal- no gross deformities noted. No edema noted on upper body.   Skin: No suspicious lesions or rashes on visible skin.  Neurologic: Clear speech, no aphasia. No facial droop.  Psychiatric: Mentation appears normal, appropriate attention. Affect normal/bright. Does not appear anxious or depressed.      Key Data Reviewed:  LABS: 11/24/21- Cr 0.85, GFR 67, Albumin 3.1, LFTs wnl.   12/3/21- WBC 5.4, Hgb 12.3, Plts 209.     IMAGING: Brain MRI 11/16/21- IMPRESSION:  1. Stable postoperative changes of left parietal craniotomy and tumor resection.  2. Small areas of enhancement along the margins of the resection cavity are unchanged, presumably representing posttreatment change.  3. Nonspecific confluent T2 hyperintense signal throughout the cerebral hemispheres and ashwin.  4. Incidental small dural based mass along the right occipital lobe probably representing incidental meningioma, unchanged.  5. 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    Impression & Recommendations & Counseling:  Olga Bailey is a 76 year old female with history of glioblastoma, anxiety, and depression.    Mood  Coping - Overall lower mood, struggling with coping with all the physical and mental changes that occurred as part of her illness and treatment journey.  - Continue Prozac 60mg daily and buspar 30mg BID.  - Referral placed to [facility] for coping support  - Consider low dose abilify in the future if needed.    Appetite  Nausea - Very poor  appetite, struggling to keep weight up.  - Trial of zyprexa 5mg at bedtime.    Sleep  Anxiety - Mood medications as above, also on seroquel for increased agitation at night and help with sleep. Will stop seroquel with starting zyprexa.  - Stop Seroquel.  - Starting zyprexea 5mg at bedtime, and additional 2.5-5mg daily as needed for breakthrough symptoms.      Follow up: 2 months    Video-Visit Details  Video Start Time: 12:39PM  Video End Time: 1:01PM  Video cut out, additional 27 mins of visit performed over the phone.    Originating Location (pt. Location): Home     Distant Location (provider location):  [facility]     Platform used for Video Visit: Scaffold     Total time spent on day of encounter is 69 mins, including reviewing record, review of above studies, above visit with patient, symptomatic discussion of mood, sleep, and poor appetite, including medication adjustments/prescription management, and documentation.     Maggi Plaza DO  Palliative Medicine   Pager [phone], AMCOM ID [id number]    Some chart documentation performed using Dragon Voice recognition Software. Although reviewed after completion, some words and grammatical errors may remain.        Again, thank you for allowing me to participate in the care of your patient.        Sincerely,        Maggi Plaza DO

## 2021-12-10 NOTE — PATIENT INSTRUCTIONS
Recommendations:  -I have placed a referral to pur palliative care clinical  to help with processing and coping support.  -We are starting zyprexa for nausea, sleep, and anxiety. Start taking the zyprexa one tablet at bedtime scheduled every night, then you can take an additional 1/2 to 1 full tablet during the day to help with anxiety/agitation.  -We will stop the seroquel once you get the Zyprexa.  -If we still need some additional mood support in the future, we discussed possibly starting a low dose of abilify.    Follow up: About 2 months      Reasons to Call    If you are having worsening/uncontrolled symptoms we want you to call!    You or your other physicians make any changes to medications we have prescribed.  -Please call for refills 4-5 days before you will run out of medication.    Important Phone Numbers    [facility] - Leila Clifford. Palliative Care RN - [phone]    [facility] - Elizabeth Mojica. Palliative Care RN - [phone]  *For Refills, scheduling, and general questions - [phone]  *After hours or on weekends. Will connect you with on call MD. [phone]

## 2021-12-20 NOTE — PROGRESS NOTES
NEURO-ONCOLOGY VISIT  Dec 21, 2021    CHIEF COMPLAINT: Ms. Olga Bailey is a 76 year old right-handed woman with a left frontoparietal glioblastoma (IDH wild-type, MGMT promoter methylated), diagnosed following gross total resection on 2/23/2021. Initiation of upfront cancer-directed treatment was delayed due to a complicated hospitalization. Given a resolving infection and lowered functional status, radiation alone was initiated on 5/4/2021 and completed on 5/27/2021.     Olga started adjuvant therapy with 150mg/m2 dosing of temozolomide, however, cycle 1 was complicated by significant hematologic toxicity. Dosing was changed to metronomic/ daily dosing in 7/2021 and this was well tolerated. Imaging from 8/2021 and 11/2021 demonstrates positive treatment effect.    Chemotherapy was placed on a hold in 10/2021 in the setting of severe constipation, but has since restarted.    Olga is presenting in follow-up accompanied by her daughter-in-law, Eusebia.    HISTORY OF PRESENT ILLNESS  -Olga is doing well today.  -Overall, tolerating daily-dosed temozolomide well.   -Sleep is good, even off Seroquel, on Zyprexa. Energy is fair during the day.   -Started a strong bowel regimen and bowels are moving. On Zyprexa and appetite is better. No issues with nausea.  -Mood is low, poor motivation. Now following with palliative care.   -Memory is impaired, difficulty with finding words, confusion at times/ sundowning, and difficulty with concentration.  -She can walk with a walker. Gait is shuffling, not steady. Had a recent fall about 2 weeks ago. Now following with Dr. Agudelo; restarting therapies (PT/ OT).  -No headaches recently.  -Denies changes in sensation.  -Off dexamethasone.   -No recurrent seizures since her last visit, tolerating Keppra.  -On anticoagulation with no significant signs/ symptoms of bleeding.  -Less swelling in the legs.     REVIEW OF SYSTEMS  A comprehensive ROS negative except as in  HPI.    MEDICATIONS   Current Outpatient Medications   Medication     acetaminophen (TYLENOL) 325 MG tablet     albuterol (PROAIR HFA/PROVENTIL HFA/VENTOLIN HFA) 108 (90 Base) MCG/ACT inhaler     albuterol (PROVENTIL) (2.5 MG/3ML) 0.083% neb solution     amLODIPine (NORVASC) 5 MG tablet     buPROPion (WELLBUTRIN XL) 150 MG 24 hr tablet     busPIRone HCl (BUSPAR) 30 MG tablet     calcium polycarbophil (FIBERCON) 625 MG tablet     famotidine (PEPCID) 20 MG tablet     FLUoxetine (PROZAC) 20 MG capsule     furosemide (LASIX) 40 MG tablet     levETIRAcetam (KEPPRA) 1000 MG tablet     levETIRAcetam (KEPPRA) 250 MG tablet     linaCLOtide (LINZESS PO)     loperamide (IMODIUM) 2 MG capsule     melatonin 3 MG tablet     Nutritional Supplements (ENSURE CLEAR) LIQD     OLANZapine (ZYPREXA) 10 MG tablet     OLANZapine (ZYPREXA) 5 MG tablet     ondansetron (ZOFRAN) 4 MG tablet     pantoprazole (PROTONIX) 40 MG EC tablet     polyethylene glycol (MIRALAX) 17 GM/Dose powder     prochlorperazine (COMPAZINE) 10 MG tablet     QUEtiapine (SEROQUEL) 50 MG tablet     rivaroxaban ANTICOAGULANT (XARELTO ANTICOAGULANT) 20 MG TABS tablet     sulfamethoxazole-trimethoprim (BACTRIM DS) 800-160 MG tablet     temozolomide (TEMODAR) 100 MG capsule     No current facility-administered medications for this visit.     Current Outpatient Medications   Medication Sig Dispense Refill     acetaminophen (TYLENOL) 325 MG tablet Take 650 mg by mouth every 4 hours as needed for mild pain        albuterol (PROAIR HFA/PROVENTIL HFA/VENTOLIN HFA) 108 (90 Base) MCG/ACT inhaler Inhale 2 puffs into the lungs every 4 hours as needed for wheezing 18 g 3     albuterol (PROVENTIL) (2.5 MG/3ML) 0.083% neb solution Take 1 vial (2.5 mg) by nebulization every 4 hours as needed for wheezing or shortness of breath / dyspnea       amLODIPine (NORVASC) 5 MG tablet Take 1 tablet (5 mg) by mouth daily       buPROPion (WELLBUTRIN XL) 150 MG 24 hr tablet Take 450 mg by mouth        busPIRone HCl (BUSPAR) 30 MG tablet Take 30 mg by mouth 2 times daily       calcium polycarbophil (FIBERCON) 625 MG tablet Take 1 tablet (625 mg) by mouth daily       famotidine (PEPCID) 20 MG tablet Take 20 mg by mouth       FLUoxetine (PROZAC) 20 MG capsule Take 3 capsules (60 mg) by mouth daily 30 capsule 0     furosemide (LASIX) 40 MG tablet Take 1 tablet (40 mg) by mouth daily 30 tablet 0     levETIRAcetam (KEPPRA) 1000 MG tablet TAKE 1 TAB BY MOUTH TWICE A DAY W/250mg=1,250mg       levETIRAcetam (KEPPRA) 250 MG tablet Take 5 tablets (1,250 mg) by mouth 2 times daily 60 tablet 0     linaCLOtide (LINZESS PO) Take 290 mcg by mouth       loperamide (IMODIUM) 2 MG capsule TAKE 2 CAPSULES (4MG) BY MOUTH AFTER FIRST LOOSE STOOL, THEN;TAKE 1 CAPSULE AFTER CONSECUTIVE STOOLS, MAX 4 CAPS/24H       melatonin 3 MG tablet Take 2 tablets (6 mg) by mouth nightly as needed for sleep 30 tablet 0     Nutritional Supplements (ENSURE CLEAR) LIQD TAKE ONE CAN BY MOUTH TWICE DAILY IN BETWEEN MEALS FOR WEIGHT LOSS       OLANZapine (ZYPREXA) 10 MG tablet TAKE 1/2 TAB (5mg) BY MOUTH AT BEDTIME       OLANZapine (ZYPREXA) 5 MG tablet Take 1 tablet every night at bedtime. Can take and additional 1/2-1 full tablet during the day as needed for anxiety/agitated. 60 tablet 1     ondansetron (ZOFRAN) 4 MG tablet Take 1 tablet (4 mg) by mouth every 8 hours as needed for nausea 30 tablet 3     pantoprazole (PROTONIX) 40 MG EC tablet Take 1 tablet (40 mg) by mouth 2 times daily (before meals) 30 tablet 0     polyethylene glycol (MIRALAX) 17 GM/Dose powder Take 17 g by mouth daily as needed for constipation 510 g 0     prochlorperazine (COMPAZINE) 10 MG tablet Take one tablet 30 min prior to nighttime oral chemotherapy 30 tablet 1     QUEtiapine (SEROQUEL) 50 MG tablet TAKE 1 TAB BY MOUTH AT BEDTIME       rivaroxaban ANTICOAGULANT (XARELTO ANTICOAGULANT) 20 MG TABS tablet Take 1 tablet (20 mg) by mouth daily (with dinner) 30 tablet 3      sulfamethoxazole-trimethoprim (BACTRIM DS) 800-160 MG tablet Take 1 tablet by mouth Every Mon, Wed, Fri Morning 12 tablet 0     temozolomide (TEMODAR) 100 MG capsule Take 1 capsule (100 mg) by mouth daily Take at bedtime on an empty stomach. Use ondansetron as needed for nausea. 28 capsule 0     DRUG ALLERGIES   Allergies   Allergen Reactions     Bacitracin Rash     Bactroban is effective; No difficulties     Erythromycin      intolerant.     Meperidine Hcl      intolerant only   Demerol     Chloraprep One Step Rash       IMMUNIZATIONS   Immunization History   Administered Date(s) Administered     COVID-19,PF,Moderna 03/18/2021, 07/22/2021     Flu 65+ Years 09/28/2020     HepB 01/01/1990     Influenza (IIV3) PF 01/01/2001     Influenza Vaccine IM > 6 months Valent IIV4 (Alfuria,Fluzone) 09/28/2020     Influenza, Quad, High Dose, Pf, 65yr+ (Fluzone HD) 09/28/2020     Pneumo Conj 13-V (2010&after) 10/29/2014     Pneumococcal 23 valent 07/22/2020     TDAP Vaccine (Boostrix) 08/22/2016     Tetanus 06/13/2004     Zoster vaccine recombinant adjuvanted (SHINGRIX) 12/24/2019, 10/12/2020     Zoster vaccine, live 12/18/2008       ONCOLOGIC HISTORY  -2/2021 PRESENTATION: Progressive aphasia.   -2/19/2021 MR brain imaging with 3.3 x 2.8 x 2.8 cm enhancing mass in left frontal-parietal region. A second contrast enhancing lesion (0.5 x 1.0 x 1.0 cm) in right occipital lobe which is dural based and largely stable in size since 9/2011; likely representing a meningioma.  -2/23/2021 SURGERY: Gross total resection by Dr. Cummings  PATHOLOGY: Glioblastoma; IDH1-R132H wild-type/ IDH 1 and 2 wildtype, MGMT promoter methylated. Not BRAF mutated.   -3/23/2021 NEURO-ONC: Recommending chemoradiotherapy.   -3/2021 ADMISSION: SOB and chest pain; CT PA negative, but bilateral DVT noted on U/S. Started on Lovenox. Acute hypoxic respiratory failure in the setting of bilateral pneumonia; presumed PJP, concern for transfusion-related acute lung  injury and right hemidiaphragm paralysis. Seizure. Right retroperitoneal hematoma. Ileus. Non-severe malnutrition on TPN with concern for refeeding syndrome. 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 Shock Liver.  -5/4 - 5/27/2021 RADS: 15 fractions.   -5/25/2021 NEURO-ONC/ DEVICE: Discussed the role of Optune; to be considered. Repeat MRI in 4 weeks with plans to start adjuvant temozolomide.   -6/22/2021 NEURO-ONC/ MRB/ CHEMO: Clinically improving. Imaging largely stable. Starting adjuvant temozolomide 150mg/m2 (250mg), cycle 1 (start date 6/24).   -7/20/2021 NEURO-ONC/ CHEMO: Clinically improving. Thrombocytopenia noted with adjuvant temozolomide dosing, platelets of 26K; will transition to metronomic dosing 50mg/m2 (100mg) daily to start once platelets are > 80K.    -8/17/2021 NEURO-ONC/ MRB/ CHEMO: Clinically improving. Saw Dr. Gamboa, who recommended starting anticoagulation; on Eliquis. Imaging with positive treatment response. Continue metronomic/ daily dosing at 50mg/m2 (100mg) through 12/2021.    -9/14/2021 NEURO-ONC/ CHEMO: Clinically improving. Continue metronomic/ daily dosing at 50mg/m2 (100mg) through 12/2021.    -10/12/2021 NEURO-ONC/ CHEMO: Clinically improving. Holding temozolomide and Zofran in the setting of severe constipation. Abdominal x-ray with high stool burden; consulted Dr. Hodge for management of constipation given history of ileus.   -10/28/2021 CHEMO: Temozolomide restarted.   -11/16/2021 NEURO-ONC/ MRB/ CHEMO: Clinically improving Less constipation, continued low appetite; referral to palliative care for mental health management. Referral to Dr. Agudelo to optimize rehab. Imaging with continued positive treatment response. Continue daily temozolomide.   -12/21/2021 NEURO-ONC/ CHEMO: Clinically improving in terms of appetite, energy. Stopping daily temozolomide at the end of the month. Discussion with Dr. Gamboa regarding removal of IVC filter.     SOCIAL HISTORY   History   Smoking Status      "Never Smoker   Smokeless Tobacco     Never Used    Social History    Substance and Sexual Activity      Alcohol use: Yes        Comment: very rare     History   Drug Use No       PHYSICAL EXAMINATION  /83 (BP Location: Left arm, Patient Position: Sitting, Cuff Size: Adult Regular)   Pulse 92   Temp 97.9  F (36.6  C) (Oral)   Resp 20   Wt 65.8 kg (145 lb)   SpO2 95%   BMI 24.13 kg/m    Wt Readings from Last 2 Encounters:   12/21/21 65.8 kg (145 lb)   12/03/21 68.5 kg (151 lb)      Ht Readings from Last 2 Encounters:   12/03/21 1.651 m (5' 5\")   09/10/21 1.6 m (5' 3\")     KPS: 60-70    -Better energy today.  -Respiratory: No increased work of breathing.  -Skin: No facial rashes.   -Psychiatric: Lower mood and affect. Pleasant, talkative.  -Neurologic:   MENTAL STATUS:     Alert, not oriented; Dec, year 20 . . . Maybe 02? Maybe 2022.     Speech fluent.   Comprehension with some confusion.      CRANIAL NERVES:     Pupils are equal, round.     Extraocular movements full, denies diplopia.     Visual fields full.     Facial sensation intact to light touch.   No facial droop.   Hearing slightly decreased bilaterally.   Normal phonation.   MOTOR: Antigravity in arms.   SENSATION: Intact to light touch throughout.  COORDINATION: Finger-nose with eyes closed off on the right.  GAIT: Can walked with a walker.       MEDICAL RECORDS  Obtained and personally reviewed all available outside medical records in addition to reviewing any records available in our electronic system.     LABS  Personally reviewed all available lab results; CBC and CMP.     IMAGING  No new neuro-imaging to review.        IMPRESSION  Clinic time for this high complexity encounter was spent discussing in detail the nature of her cancer, providing emotional support, answering questions pertaining to my recommendations, and devising the plan as outlined below.     Clinically, Olga doing fairly well today. With supportive medications, she denies " issues with constipation or nausea. With the start of Zyprexa, appetite and sleep is better. Olga has connected well with her palliative care provider; Dr. Plaza and with the start of Prozac and buspar, her mood slightly better, but remains low. Also, Olga is now following with Dr. Agudelo with therapies, which is critical has she has had falls due to instability of her gait.     Currently on daily temozolomide and Olga continues to tolerate chemotherapy well. Labs from 12/9 reviewed and are without concern. Olga has now completed the planned 6 month course of adjuvant temozolomide and with imaging from last month without any concern, the plan is to stop chemotherapy at the end of this month once her prescription has run out. ADDENDUM: Pharmacy [facility] care facility and Olga's last dose of temozolomide will be on 12/26. For now, Olga will be managed on imaging surveillance alone.     Today, I discussed with Olga and Eusebia how chemotherapy can induce fatigue and can also lower blood counts that can contribute to fatigue. In tur, chronic fatigue can decrease one's activity level and lead to deconditioning. I am hopeful that with stopping the adjuvant temozolomide as planned, through the continued efforts with Dr. Agudelo and therapies to rebuild strength, plus optimazing mood and motivation with palliative care, Olga's functional status can see improvement. It was also explained that given Olga's excellent radiographic response to treatment to date and the cancer's known MGMT promoter methylated status, there is a possibility that Olga could go of another year or so with stable imaging and no need to reinitiate cancer-directed therapy.     I will converse with Dr. Gamboa about the risks and benefits of removing the IVC filter now that Olga is off chemotherapy.     PROBLEM LIST  Glioblastoma  Aphasia  Apraxia    PLAN  -CANCER DIRECTED THERAPY-  -Stopping daily (metronomic)  on 12/26.   -Supportive medications; Compazine as needed. Bowel regimen per GI provider; Dr. Hodge.  -On Bactrim as ALC is not > 0.5.    Will discuss with pharmacy the continued need for Bactrim with the stop of temozolomide.  -Continue lab monitoring; CBC and CMP every 4 weeks x 2 and then, likely every 3 months.   -Repeat imaging in 2 months (2/2022).    -STEROIDS-  -Off dexamethasone.    -SEIZURE MANAGEMENT-  -Given history of seizures, antiepileptics are indicated.   -Continue Keppra.     -DVT-  -Following with Dr. Gamboa; currently on Eliquis.   -Requires lifelong anticoagulation given cancer diagnosis.   -Will need to consider removal of the IVC filter.     -Quality of life/ MOOD/ FATIGUE-  -Depressed mood, PTSD, poor motivation, poor appetite.   -On Zyprexa for insomnia and appetite stimulation.   -Following with palliative care to assist with mental health; on Prozac and Buspar.  -Recommend using CPAP for MARCELLE, as this could help with daytime fatigue.    -REHAB NEEDS-  -PT/ OT.   -Following with Dr. Agudelo in cancer rehab.     Return to clinic in 2 months + labs + imaging.     In the meantime, Olga and La Nnea know to call with questions or concerns or to report new complaints and can be seen sooner if needed.     Stephanie Baker MD  Neuro-oncology

## 2021-12-21 ENCOUNTER — ONCOLOGY VISIT (OUTPATIENT)
Dept: ONCOLOGY | Facility: CLINIC | Age: 76
End: 2021-12-21
Attending: PSYCHIATRY & NEUROLOGY
Payer: MEDICARE

## 2021-12-21 ENCOUNTER — PATIENT OUTREACH (OUTPATIENT)
Dept: ONCOLOGY | Facility: CLINIC | Age: 76
End: 2021-12-21

## 2021-12-21 VITALS
TEMPERATURE: 97.9 F | DIASTOLIC BLOOD PRESSURE: 83 MMHG | OXYGEN SATURATION: 95 % | WEIGHT: 145 LBS | RESPIRATION RATE: 20 BRPM | BODY MASS INDEX: 24.13 KG/M2 | SYSTOLIC BLOOD PRESSURE: 136 MMHG | HEART RATE: 92 BPM

## 2021-12-21 DIAGNOSIS — C71.9 GLIOBLASTOMA (H): Primary | ICD-10-CM

## 2021-12-21 PROCEDURE — G0463 HOSPITAL OUTPT CLINIC VISIT: HCPCS

## 2021-12-21 PROCEDURE — 99215 OFFICE O/P EST HI 40 MIN: CPT | Performed by: PSYCHIATRY & NEUROLOGY

## 2021-12-21 RX ORDER — OLANZAPINE 10 MG/1
TABLET ORAL
Status: ON HOLD | COMMUNITY
Start: 2021-12-10 | End: 2022-04-05

## 2021-12-21 RX ORDER — LOPERAMIDE HCL 2 MG
CAPSULE ORAL
Status: ON HOLD | COMMUNITY
Start: 2021-11-09 | End: 2023-11-27

## 2021-12-21 ASSESSMENT — PAIN SCALES - GENERAL: PAINLEVEL: NO PAIN (0)

## 2021-12-21 NOTE — PROGRESS NOTES
"Per Dr. Baker,   \"Please contact care facility.   Once pt has taken remaining TMZ caps, can stop chemotherapy.   Please let me know # that is remaining.     Stephanie Baker MD   Neuro-oncology   12/21/2021\"    Contacted [facility] and spoke with ELEANOR Jimenes. Updated on plan. ELEANOR Jimenes reports they have 6 caps remaining so last dose of TMZ will be 12/26/21. Updated care team.     Addendum 12/23:   Faxed Dr. Baker's visit note to [facility] along with updated TMZ Rx advising BAYLEE discontinue TMZ after 12/26 dose. Requested BAYLEE continue lab monitoring:   CBC and CMP every 4 weeks x 2 and then, likely every 3 months.      Louise Jerry, JIANN, RN, PHN, OCN  Oncology Care Coordinator  [facility]      "

## 2021-12-21 NOTE — LETTER
12/21/2021         RE: Olga Bailey  [address] [facility] [address]        Dear Colleague,    Thank you for referring your patient, Olga Bailey, to the [facility] [place]. Please see a copy of my visit note below.    NEURO-ONCOLOGY VISIT  Dec 21, 2021    CHIEF COMPLAINT: Ms. Olga Bailey is a 76 year old right-handed woman with a left frontoparietal glioblastoma (IDH wild-type, MGMT promoter methylated), diagnosed following gross total resection on 2/23/2021. Initiation of upfront cancer-directed treatment was delayed due to a complicated hospitalization. Given a resolving infection and lowered functional status, radiation alone was initiated on 5/4/2021 and completed on 5/27/2021.     Olga started adjuvant therapy with 150mg/m2 dosing of temozolomide, however, cycle 1 was complicated by significant hematologic toxicity. Dosing was changed to metronomic/ daily dosing in 7/2021 and this was well tolerated. Imaging from 8/2021 and 11/2021 demonstrates positive treatment effect.    Chemotherapy was placed on a hold in 10/2021 in the setting of severe constipation, but has since restarted.    Olga is presenting in follow-up accompanied by her daughter-in-law, Eusebia.    HISTORY OF PRESENT ILLNESS  -Olga is doing well today.  -Overall, tolerating daily-dosed temozolomide well.   -Sleep is good, even off Seroquel, on Zyprexa. Energy is fair during the day.   -Started a strong bowel regimen and bowels are moving. On Zyprexa and appetite is better. No issues with nausea.  -Mood is low, poor motivation. Now following with palliative care.   -Memory is impaired, difficulty with finding words, confusion at times/ sundowning, and difficulty with concentration.  -She can walk with a walker. Gait is shuffling, not steady. Had a recent fall about 2 weeks ago. Now following with Dr. Agudelo; restarting therapies (PT/ OT).  -No headaches recently.  -Denies changes in  sensation.  -Off dexamethasone.   -No recurrent seizures since her last visit, tolerating Keppra.  -On anticoagulation with no significant signs/ symptoms of bleeding.  -Less swelling in the legs.     REVIEW OF SYSTEMS  A comprehensive ROS negative except as in HPI.    MEDICATIONS   Current Outpatient Medications   Medication     acetaminophen (TYLENOL) 325 MG tablet     albuterol (PROAIR HFA/PROVENTIL HFA/VENTOLIN HFA) 108 (90 Base) MCG/ACT inhaler     albuterol (PROVENTIL) (2.5 MG/3ML) 0.083% neb solution     amLODIPine (NORVASC) 5 MG tablet     buPROPion (WELLBUTRIN XL) 150 MG 24 hr tablet     busPIRone HCl (BUSPAR) 30 MG tablet     calcium polycarbophil (FIBERCON) 625 MG tablet     famotidine (PEPCID) 20 MG tablet     FLUoxetine (PROZAC) 20 MG capsule     furosemide (LASIX) 40 MG tablet     levETIRAcetam (KEPPRA) 1000 MG tablet     levETIRAcetam (KEPPRA) 250 MG tablet     linaCLOtide (LINZESS PO)     loperamide (IMODIUM) 2 MG capsule     melatonin 3 MG tablet     Nutritional Supplements (ENSURE CLEAR) LIQD     OLANZapine (ZYPREXA) 10 MG tablet     OLANZapine (ZYPREXA) 5 MG tablet     ondansetron (ZOFRAN) 4 MG tablet     pantoprazole (PROTONIX) 40 MG EC tablet     polyethylene glycol (MIRALAX) 17 GM/Dose powder     prochlorperazine (COMPAZINE) 10 MG tablet     QUEtiapine (SEROQUEL) 50 MG tablet     rivaroxaban ANTICOAGULANT (XARELTO ANTICOAGULANT) 20 MG TABS tablet     sulfamethoxazole-trimethoprim (BACTRIM DS) 800-160 MG tablet     temozolomide (TEMODAR) 100 MG capsule     No current facility-administered medications for this visit.     Current Outpatient Medications   Medication Sig Dispense Refill     acetaminophen (TYLENOL) 325 MG tablet Take 650 mg by mouth every 4 hours as needed for mild pain        albuterol (PROAIR HFA/PROVENTIL HFA/VENTOLIN HFA) 108 (90 Base) MCG/ACT inhaler Inhale 2 puffs into the lungs every 4 hours as needed for wheezing 18 g 3     albuterol (PROVENTIL) (2.5 MG/3ML) 0.083% neb  solution Take 1 vial (2.5 mg) by nebulization every 4 hours as needed for wheezing or shortness of breath / dyspnea       amLODIPine (NORVASC) 5 MG tablet Take 1 tablet (5 mg) by mouth daily       buPROPion (WELLBUTRIN XL) 150 MG 24 hr tablet Take 450 mg by mouth       busPIRone HCl (BUSPAR) 30 MG tablet Take 30 mg by mouth 2 times daily       calcium polycarbophil (FIBERCON) 625 MG tablet Take 1 tablet (625 mg) by mouth daily       famotidine (PEPCID) 20 MG tablet Take 20 mg by mouth       FLUoxetine (PROZAC) 20 MG capsule Take 3 capsules (60 mg) by mouth daily 30 capsule 0     furosemide (LASIX) 40 MG tablet Take 1 tablet (40 mg) by mouth daily 30 tablet 0     levETIRAcetam (KEPPRA) 1000 MG tablet TAKE 1 TAB BY MOUTH TWICE A DAY W/250mg=1,250mg       levETIRAcetam (KEPPRA) 250 MG tablet Take 5 tablets (1,250 mg) by mouth 2 times daily 60 tablet 0     linaCLOtide (LINZESS PO) Take 290 mcg by mouth       loperamide (IMODIUM) 2 MG capsule TAKE 2 CAPSULES (4MG) BY MOUTH AFTER FIRST LOOSE STOOL, THEN;TAKE 1 CAPSULE AFTER CONSECUTIVE STOOLS, MAX 4 CAPS/24H       melatonin 3 MG tablet Take 2 tablets (6 mg) by mouth nightly as needed for sleep 30 tablet 0     Nutritional Supplements (ENSURE CLEAR) LIQD TAKE ONE CAN BY MOUTH TWICE DAILY IN BETWEEN MEALS FOR WEIGHT LOSS       OLANZapine (ZYPREXA) 10 MG tablet TAKE 1/2 TAB (5mg) BY MOUTH AT BEDTIME       OLANZapine (ZYPREXA) 5 MG tablet Take 1 tablet every night at bedtime. Can take and additional 1/2-1 full tablet during the day as needed for anxiety/agitated. 60 tablet 1     ondansetron (ZOFRAN) 4 MG tablet Take 1 tablet (4 mg) by mouth every 8 hours as needed for nausea 30 tablet 3     pantoprazole (PROTONIX) 40 MG EC tablet Take 1 tablet (40 mg) by mouth 2 times daily (before meals) 30 tablet 0     polyethylene glycol (MIRALAX) 17 GM/Dose powder Take 17 g by mouth daily as needed for constipation 510 g 0     prochlorperazine (COMPAZINE) 10 MG tablet Take one tablet 30  min prior to nighttime oral chemotherapy 30 tablet 1     QUEtiapine (SEROQUEL) 50 MG tablet TAKE 1 TAB BY MOUTH AT BEDTIME       rivaroxaban ANTICOAGULANT (XARELTO ANTICOAGULANT) 20 MG TABS tablet Take 1 tablet (20 mg) by mouth daily (with dinner) 30 tablet 3     sulfamethoxazole-trimethoprim (BACTRIM DS) 800-160 MG tablet Take 1 tablet by mouth Every Mon, Wed, Fri Morning 12 tablet 0     temozolomide (TEMODAR) 100 MG capsule Take 1 capsule (100 mg) by mouth daily Take at bedtime on an empty stomach. Use ondansetron as needed for nausea. 28 capsule 0     DRUG ALLERGIES   Allergies   Allergen Reactions     Bacitracin Rash     Bactroban is effective; No difficulties     Erythromycin      intolerant.     Meperidine Hcl      intolerant only   Demerol     Chloraprep One Step Rash       IMMUNIZATIONS   Immunization History   Administered Date(s) Administered     COVID-19,PF,Moderna 03/18/2021, 07/22/2021     Flu 65+ Years 09/28/2020     HepB 01/01/1990     Influenza (IIV3) PF 01/01/2001     Influenza Vaccine IM > 6 months Valent IIV4 (Alfuria,Fluzone) 09/28/2020     Influenza, Quad, High Dose, Pf, 65yr+ (Fluzone HD) 09/28/2020     Pneumo Conj 13-V (2010&after) 10/29/2014     Pneumococcal 23 valent 07/22/2020     TDAP Vaccine (Boostrix) 08/22/2016     Tetanus 06/13/2004     Zoster vaccine recombinant adjuvanted (SHINGRIX) 12/24/2019, 10/12/2020     Zoster vaccine, live 12/18/2008       ONCOLOGIC HISTORY  -2/2021 PRESENTATION: Progressive aphasia.   -2/19/2021 MR brain imaging with 3.3 x 2.8 x 2.8 cm enhancing mass in left frontal-parietal region. A second contrast enhancing lesion (0.5 x 1.0 x 1.0 cm) in right occipital lobe which is dural based and largely stable in size since 9/2011; likely representing a meningioma.  -2/23/2021 SURGERY: Gross total resection by Dr. Cummings  PATHOLOGY: Glioblastoma; IDH1-R132H wild-type/ IDH 1 and 2 wildtype, MGMT promoter methylated. Not BRAF mutated.   -3/23/2021 NEURO-ONC:  Recommending chemoradiotherapy.   -3/2021 ADMISSION: SOB and chest pain; CT PA negative, but bilateral DVT noted on U/S. Started on Lovenox. Acute hypoxic respiratory failure in the setting of bilateral pneumonia; presumed PJP, concern for transfusion-related acute lung injury and right hemidiaphragm paralysis. Seizure. Right retroperitoneal hematoma. Ileus. Non-severe malnutrition on TPN with concern for refeeding syndrome. 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 Shock Liver.  -5/4 - 5/27/2021 RADS: 15 fractions.   -5/25/2021 NEURO-ONC/ DEVICE: Discussed the role of Optune; to be considered. Repeat MRI in 4 weeks with plans to start adjuvant temozolomide.   -6/22/2021 NEURO-ONC/ MRB/ CHEMO: Clinically improving. Imaging largely stable. Starting adjuvant temozolomide 150mg/m2 (250mg), cycle 1 (start date 6/24).   -7/20/2021 NEURO-ONC/ CHEMO: Clinically improving. Thrombocytopenia noted with adjuvant temozolomide dosing, platelets of 26K; will transition to metronomic dosing 50mg/m2 (100mg) daily to start once platelets are > 80K.    -8/17/2021 NEURO-ONC/ MRB/ CHEMO: Clinically improving. Saw Dr. Gamboa, who recommended starting anticoagulation; on Eliquis. Imaging with positive treatment response. Continue metronomic/ daily dosing at 50mg/m2 (100mg) through 12/2021.    -9/14/2021 NEURO-ONC/ CHEMO: Clinically improving. Continue metronomic/ daily dosing at 50mg/m2 (100mg) through 12/2021.    -10/12/2021 NEURO-ONC/ CHEMO: Clinically improving. Holding temozolomide and Zofran in the setting of severe constipation. Abdominal x-ray with high stool burden; consulted Dr. Hodge for management of constipation given history of ileus.   -10/28/2021 CHEMO: Temozolomide restarted.   -11/16/2021 NEURO-ONC/ MRB/ CHEMO: Clinically improving Less constipation, continued low appetite; referral to palliative care for mental health management. Referral to Dr. Agudelo to optimize rehab. Imaging with continued positive treatment response.  "Continue daily temozolomide.   -12/21/2021 NEURO-ONC/ CHEMO: Clinically improving in terms of appetite, energy. Stopping daily temozolomide at the end of the month. Discussion with Dr. Gamboa regarding removal of IVC filter.     SOCIAL HISTORY   History   Smoking Status     Never Smoker   Smokeless Tobacco     Never Used    Social History    Substance and Sexual Activity      Alcohol use: Yes        Comment: very rare     History   Drug Use No       PHYSICAL EXAMINATION  /83 (BP Location: Left arm, Patient Position: Sitting, Cuff Size: Adult Regular)   Pulse 92   Temp 97.9  F (36.6  C) (Oral)   Resp 20   Wt 65.8 kg (145 lb)   SpO2 95%   BMI 24.13 kg/m    Wt Readings from Last 2 Encounters:   12/21/21 65.8 kg (145 lb)   12/03/21 68.5 kg (151 lb)      Ht Readings from Last 2 Encounters:   12/03/21 1.651 m (5' 5\")   09/10/21 1.6 m (5' 3\")     KPS: 60-70    -Better energy today.  -Respiratory: No increased work of breathing.  -Skin: No facial rashes.   -Psychiatric: Lower mood and affect. Pleasant, talkative.  -Neurologic:   MENTAL STATUS:     Alert, not oriented; Dec, year 20 . . . Maybe 02? Maybe 2022.     Speech fluent.   Comprehension with some confusion.      CRANIAL NERVES:     Pupils are equal, round.     Extraocular movements full, denies diplopia.     Visual fields full.     Facial sensation intact to light touch.   No facial droop.   Hearing slightly decreased bilaterally.   Normal phonation.   MOTOR: Antigravity in arms.   SENSATION: Intact to light touch throughout.  COORDINATION: Finger-nose with eyes closed off on the right.  GAIT: Can walked with a walker.       MEDICAL RECORDS  Obtained and personally reviewed all available outside medical records in addition to reviewing any records available in our electronic system.     LABS  Personally reviewed all available lab results; CBC and CMP.     IMAGING  No new neuro-imaging to review.        IMPRESSION  Clinic time for this high complexity " encounter was spent discussing in detail the nature of her cancer, providing emotional support, answering questions pertaining to my recommendations, and devising the plan as outlined below.     Clinically, Olga doing fairly well today. With supportive medications, she denies issues with constipation or nausea. With the start of Zyprexa, appetite and sleep is better. Olga has connected well with her palliative care provider; Dr. Plaza and with the start of Prozac and buspar, her mood slightly better, but remains low. Also, Olga is now following with Dr. Agudelo with therapies, which is critical has she has had falls due to instability of her gait.     Currently on daily temozolomide and Olga continues to tolerate chemotherapy well. Labs from 12/9 reviewed and are without concern. Olga has now completed the planned 6 month course of adjuvant temozolomide and with imaging from last month without any concern, the plan is to stop chemotherapy at the end of this month once her prescription has run out. ADDENDUM: Pharmacy called care facility and Olga's last dose of temozolomide will be on 12/26. For now, Olga will be managed on imaging surveillance alone.     Today, I discussed with Olga and Eusebia how chemotherapy can induce fatigue and can also lower blood counts that can contribute to fatigue. In turm, chronic fatigue can decrease one's activity level and lead to deconditioning. I am hopeful that with stopping the adjuvant temozolomide as planned, through the continued efforts with Dr. Agudelo and therapies to rebuild strength, plus optimazing mood and motivation with palliative care, Olga's functional status can see improvement. It was also explained that given Olga's excellent radiographic response to treatment to date and the cancer's known MGMT promoter methylated status, there is a possibility that Olga could go of another year or so with stable imaging and no need to  reinitiate cancer-directed therapy.     I will converse with Dr. Gamboa about the risks and benefits of removing the IVC filter now that Olga is off chemotherapy.     PROBLEM LIST  Glioblastoma  Aphasia  Apraxia    PLAN  -CANCER DIRECTED THERAPY-  -Stopping daily (metronomic) on 12/26.   -Supportive medications; Compazine as needed. Bowel regimen per GI provider; Dr. Hodge.  -On Bactrim as ALC is not > 0.5.    Will discuss with pharmacy the continued need for Bactrim with the stop of temozolomide.  -Continue lab monitoring; CBC and CMP every 4 weeks x 2 and then, likely every 3 months.   -Repeat imaging in 2 months (2/2022).    -STEROIDS-  -Off dexamethasone.    -SEIZURE MANAGEMENT-  -Given history of seizures, antiepileptics are indicated.   -Continue Keppra.     -DVT-  -Following with Dr. Gamboa; currently on Eliquis.   -Requires lifelong anticoagulation given cancer diagnosis.   -Will need to consider removal of the IVC filter.     -Quality of life/ MOOD/ FATIGUE-  -Depressed mood, PTSD, poor motivation, poor appetite.   -On Zyprexa for insomnia and appetite stimulation.   -Following with palliative care to assist with mental health; on Prozac and Buspar.  -Recommend using CPAP for MARCELLE, as this could help with daytime fatigue.    -REHAB NEEDS-  -PT/ OT.   -Following with Dr. Agudelo in cancer rehab.     Return to clinic in 2 months + labs + imaging.     In the meantime, Olga and La Nena know to call with questions or concerns or to report new complaints and can be seen sooner if needed.     Stephanie Baker MD  Neuro-oncology        Again, thank you for allowing me to participate in the care of your patient.        Sincerely,        Stephanie Baker MD

## 2021-12-22 ENCOUNTER — LAB REQUISITION (OUTPATIENT)
Dept: LAB | Facility: CLINIC | Age: 76
End: 2021-12-22
Payer: MEDICARE

## 2021-12-22 DIAGNOSIS — R11.2 NAUSEA WITH VOMITING, UNSPECIFIED: ICD-10-CM

## 2021-12-22 DIAGNOSIS — D61.810 ANTINEOPLASTIC CHEMOTHERAPY INDUCED PANCYTOPENIA (CODE) (H): ICD-10-CM

## 2021-12-23 DIAGNOSIS — C71.9 GLIOBLASTOMA (H): ICD-10-CM

## 2021-12-23 LAB
ALBUMIN SERPL-MCNC: 3.4 G/DL (ref 3.5–5)
ALP SERPL-CCNC: 89 U/L (ref 45–120)
ALT SERPL W P-5'-P-CCNC: <9 U/L (ref 0–45)
ANION GAP SERPL CALCULATED.3IONS-SCNC: 12 MMOL/L (ref 5–18)
AST SERPL W P-5'-P-CCNC: 11 U/L (ref 0–40)
BASOPHILS # BLD AUTO: 0 10E3/UL (ref 0–0.2)
BASOPHILS NFR BLD AUTO: 0 %
BILIRUB SERPL-MCNC: 0.4 MG/DL (ref 0–1)
BUN SERPL-MCNC: 17 MG/DL (ref 8–28)
CALCIUM SERPL-MCNC: 8.8 MG/DL (ref 8.5–10.5)
CHLORIDE BLD-SCNC: 106 MMOL/L (ref 98–107)
CO2 SERPL-SCNC: 24 MMOL/L (ref 22–31)
CREAT SERPL-MCNC: 1.1 MG/DL (ref 0.6–1.1)
EOSINOPHIL # BLD AUTO: 0.2 10E3/UL (ref 0–0.7)
EOSINOPHIL NFR BLD AUTO: 5 %
ERYTHROCYTE [DISTWIDTH] IN BLOOD BY AUTOMATED COUNT: 13.4 % (ref 10–15)
GFR SERPL CREATININE-BSD FRML MDRD: 52 ML/MIN/1.73M2
GLUCOSE BLD-MCNC: 160 MG/DL (ref 70–125)
HCT VFR BLD AUTO: 30.7 % (ref 35–47)
HGB BLD-MCNC: 10.1 G/DL (ref 11.7–15.7)
IMM GRANULOCYTES # BLD: 0.1 10E3/UL
IMM GRANULOCYTES NFR BLD: 1 %
LYMPHOCYTES # BLD AUTO: 0.1 10E3/UL (ref 0.8–5.3)
LYMPHOCYTES NFR BLD AUTO: 3 %
MCH RBC QN AUTO: 32.8 PG (ref 26.5–33)
MCHC RBC AUTO-ENTMCNC: 32.9 G/DL (ref 31.5–36.5)
MCV RBC AUTO: 100 FL (ref 78–100)
MONOCYTES # BLD AUTO: 0.4 10E3/UL (ref 0–1.3)
MONOCYTES NFR BLD AUTO: 9 %
NEUTROPHILS # BLD AUTO: 3.7 10E3/UL (ref 1.6–8.3)
NEUTROPHILS NFR BLD AUTO: 82 %
NRBC # BLD AUTO: 0 10E3/UL
NRBC BLD AUTO-RTO: 0 /100
PLATELET # BLD AUTO: 160 10E3/UL (ref 150–450)
POTASSIUM BLD-SCNC: 3.5 MMOL/L (ref 3.5–5)
PROT SERPL-MCNC: 5.5 G/DL (ref 6–8)
RBC # BLD AUTO: 3.08 10E6/UL (ref 3.8–5.2)
SODIUM SERPL-SCNC: 142 MMOL/L (ref 136–145)
WBC # BLD AUTO: 4.5 10E3/UL (ref 4–11)

## 2021-12-23 PROCEDURE — 85025 COMPLETE CBC W/AUTO DIFF WBC: CPT | Mod: ORL | Performed by: PSYCHIATRY & NEUROLOGY

## 2021-12-23 PROCEDURE — 36415 COLL VENOUS BLD VENIPUNCTURE: CPT | Mod: ORL | Performed by: PSYCHIATRY & NEUROLOGY

## 2021-12-23 PROCEDURE — 80053 COMPREHEN METABOLIC PANEL: CPT | Mod: ORL | Performed by: PSYCHIATRY & NEUROLOGY

## 2021-12-23 PROCEDURE — P9603 ONE-WAY ALLOW PRORATED MILES: HCPCS | Mod: ORL | Performed by: PSYCHIATRY & NEUROLOGY

## 2021-12-23 RX ORDER — TEMOZOLOMIDE 100 MG/1
50 CAPSULE ORAL DAILY
Qty: 28 CAPSULE | Refills: 0 | COMMUNITY
Start: 2021-12-23 | End: 2022-02-27

## 2021-12-27 ENCOUNTER — LAB REQUISITION (OUTPATIENT)
Dept: LAB | Facility: CLINIC | Age: 76
End: 2021-12-27
Payer: MEDICARE

## 2021-12-27 DIAGNOSIS — D61.810 ANTINEOPLASTIC CHEMOTHERAPY INDUCED PANCYTOPENIA (CODE) (H): ICD-10-CM

## 2021-12-29 ENCOUNTER — TELEPHONE (OUTPATIENT)
Dept: ONCOLOGY | Facility: CLINIC | Age: 76
End: 2021-12-29
Payer: MEDICARE

## 2021-12-29 ENCOUNTER — TELEPHONE (OUTPATIENT)
Dept: PULMONOLOGY | Facility: CLINIC | Age: 76
End: 2021-12-29
Payer: MEDICARE

## 2021-12-29 DIAGNOSIS — J84.9 ILD (INTERSTITIAL LUNG DISEASE) (H): Primary | ICD-10-CM

## 2021-12-29 NOTE — TELEPHONE ENCOUNTER
Left voicemail for patient to contact me (direct number provided) or the call center (number provided) to discuss scheduling an appointment as records indicate they are due for a follow up visit with Dr. Egan with [facility] prior for some time in June 2022 after receiving communication from provider. As they do have a [email or web address] account, informed them that I would send them a message detailing the same information.

## 2021-12-29 NOTE — TELEPHONE ENCOUNTER
"LM for ELEANOR Jimenes at [facility]. Gave information per Dr Baker's request.  \"I will defer to primary care (NP) for management of Bactrim now that she is off chemotherapy.   Please make sure NP at the NH knows that the question for them is regarding the continued need for PCP ppx (with Bactrim) in the setting of chronic lymphopenia. Not sure if they would want to consult ID for a more definitive recommendation.\"    Requested call back with any questions.    Bessie Wang RN    "

## 2021-12-29 NOTE — TELEPHONE ENCOUNTER
Called to Pts daughter, La Nena to update on Nury Baker/Cooper advice to have IVC filter removed. Order has been placed and Katie with IR will call La Nena to set up and explain the risks of procedure. Requested callback with any questions.    Bessie Wang RN

## 2021-12-31 ENCOUNTER — NURSE TRIAGE (OUTPATIENT)
Dept: FAMILY MEDICINE | Facility: CLINIC | Age: 76
End: 2021-12-31
Payer: MEDICARE

## 2021-12-31 NOTE — TELEPHONE ENCOUNTER
S-(situation): Patient's daughter called with concerns of patient's decline in condition.     B-(background): According to patient's physical therapist, patient has had a decline over the last 2 days, can hardly walk.     A-(assessment): Simply due to rapid decline of condition, advised daughter to take patient to be seen today in either urgent care or the emergency department. Daughter concerned due to recent lab work done on 12/23/2021, including lymphocyte counts. Discussion had that if decline happening this quickly, needs to be evaluated sooner rather than later.     R-(recommendations): Daughter agreed to have discussion with brother and patient regarding recommendation. Transferred to scheduling to make appointment with PCP next week as back up plan.     Maddy Dugan, JIANN, RN  [facility]

## 2022-01-03 ENCOUNTER — TELEPHONE (OUTPATIENT)
Dept: PULMONOLOGY | Facility: CLINIC | Age: 77
End: 2022-01-03
Payer: MEDICARE

## 2022-01-03 ENCOUNTER — TELEPHONE (OUTPATIENT)
Dept: ONCOLOGY | Facility: CLINIC | Age: 77
End: 2022-01-03
Payer: MEDICARE

## 2022-01-03 NOTE — TELEPHONE ENCOUNTER
"Per Dr. Baker, \"I would have care facility do an infectious work-up per primary care; rule out PNA, UTI, etc.   Monitor vitals to assess for hypotension/ tachycardia, etc.     Would also be helpful to have an update on how the patient is currently doing.\"    Called La Nena to discuss Dr. Baker's recommendations but did not get an answer. Left voicemail for La Nena to call the clinic back.  "

## 2022-01-03 NOTE — TELEPHONE ENCOUNTER
Spoke with La Nena, pt's daughter, as number is for her and she manages all scheduling; discussed with daughter regarding scheduling a 6 month follow up with Dr. Egan with [facility] prior for some time in June, as instructed by provider. appt was scheduled and details confirmed with daughter.

## 2022-01-03 NOTE — TELEPHONE ENCOUNTER
"Patient's daughter, La Nena, called to report new weakness that patient has been having. Patient is being seen by PT and they noticed a decline in strength from Tuesday to Thursday. The nurse from patient's facility also contacted La Nena and reported that patient had fallen on Wednesday night. Patient was significantly weaker on Thursday, and could barely walk on Friday. Legs were \"crumpling\" underneath her. On Saturday, symptoms were improved and patient could walk fine. Declines changes in speech, orientation level, lethargy, blurry vision, one-sided weakness, or any other symptoms besides the leg weakness. La Nena does not know how patient is doing this morning. La Nena wondering if patient could be seen by Dr. Baker.    Will route to Dr. Baker for recommendations.     Radha Mi, RN  Triage Nurse Advisor  [facility]  "

## 2022-01-03 NOTE — TELEPHONE ENCOUNTER
La Nena called back and message as noted below was given. Understanding verbalized. They will follow up with care facility.    Iwona Solitario, RN, BSN, PHN

## 2022-01-04 ENCOUNTER — LAB REQUISITION (OUTPATIENT)
Dept: LAB | Facility: CLINIC | Age: 77
End: 2022-01-04
Payer: MEDICARE

## 2022-01-04 DIAGNOSIS — D61.810 ANTINEOPLASTIC CHEMOTHERAPY INDUCED PANCYTOPENIA (CODE) (H): ICD-10-CM

## 2022-01-05 ENCOUNTER — LAB REQUISITION (OUTPATIENT)
Dept: LAB | Facility: CLINIC | Age: 77
End: 2022-01-05
Payer: MEDICARE

## 2022-01-05 DIAGNOSIS — R63.0 ANOREXIA: ICD-10-CM

## 2022-01-05 DIAGNOSIS — F41.9 ANXIETY: ICD-10-CM

## 2022-01-05 DIAGNOSIS — R11.0 NAUSEA: ICD-10-CM

## 2022-01-05 DIAGNOSIS — R30.0 DYSURIA: ICD-10-CM

## 2022-01-05 LAB
ALBUMIN UR-MCNC: 30 MG/DL
APPEARANCE UR: CLEAR
BILIRUB UR QL STRIP: NEGATIVE
COLOR UR AUTO: ABNORMAL
GLUCOSE UR STRIP-MCNC: NEGATIVE MG/DL
HGB UR QL STRIP: ABNORMAL
KETONES UR STRIP-MCNC: NEGATIVE MG/DL
LEUKOCYTE ESTERASE UR QL STRIP: ABNORMAL
MUCOUS THREADS #/AREA URNS LPF: PRESENT /LPF
NITRATE UR QL: NEGATIVE
PH UR STRIP: 5.5 [PH] (ref 5–7)
RBC URINE: 5 /HPF
SP GR UR STRIP: 1.02 (ref 1–1.03)
SQUAMOUS EPITHELIAL: <1 /HPF
UROBILINOGEN UR STRIP-MCNC: <2 MG/DL
WBC URINE: 13 /HPF

## 2022-01-05 PROCEDURE — 81001 URINALYSIS AUTO W/SCOPE: CPT | Mod: ORL

## 2022-01-05 PROCEDURE — 87086 URINE CULTURE/COLONY COUNT: CPT | Mod: ORL

## 2022-01-05 RX ORDER — OLANZAPINE 5 MG/1
TABLET ORAL
Qty: 60 TABLET | Refills: 1 | Status: SHIPPED | OUTPATIENT
Start: 2022-01-05 | End: 2022-04-18

## 2022-01-05 NOTE — TELEPHONE ENCOUNTER
Received update from dtr La Nena that appetite has been better overall and she feels pt is tolerating this medication well.     JIAN YuN, RN  Palliative Care Nurse Clinician    [phone] (Direct)  [phone] (Refills)  [phone] (Appointment Scheduling)

## 2022-01-06 LAB — BACTERIA UR CULT: NO GROWTH

## 2022-01-06 NOTE — TELEPHONE ENCOUNTER
"Also reviewed need to continue Bactrim with Pulomonary team.     Per Dr. Egan, \"From a Pulmonary perspective, the bactrim is no longer needed.\"    Left VM updating La Nena on this. Requested call back if questions.    Louise Jerry, BSN, RN, PHN, OCN  Oncology Care Coordinator  [facility]            "

## 2022-01-07 LAB
ANION GAP SERPL CALCULATED.3IONS-SCNC: 12 MMOL/L (ref 5–18)
BASOPHILS # BLD MANUAL: 0 10E3/UL (ref 0–0.2)
BASOPHILS NFR BLD MANUAL: 1 %
BUN SERPL-MCNC: 18 MG/DL (ref 8–28)
CALCIUM SERPL-MCNC: 8.9 MG/DL (ref 8.5–10.5)
CHLORIDE BLD-SCNC: 108 MMOL/L (ref 98–107)
CO2 SERPL-SCNC: 24 MMOL/L (ref 22–31)
CREAT SERPL-MCNC: 1.01 MG/DL (ref 0.6–1.1)
ELLIPTOCYTES BLD QL SMEAR: SLIGHT
EOSINOPHIL # BLD MANUAL: 0.1 10E3/UL (ref 0–0.7)
EOSINOPHIL NFR BLD MANUAL: 8 %
ERYTHROCYTE [DISTWIDTH] IN BLOOD BY AUTOMATED COUNT: 13.2 % (ref 10–15)
GFR SERPL CREATININE-BSD FRML MDRD: 57 ML/MIN/1.73M2
GLUCOSE BLD-MCNC: 106 MG/DL (ref 70–125)
HCT VFR BLD AUTO: 31.6 % (ref 35–47)
HGB BLD-MCNC: 10.1 G/DL (ref 11.7–15.7)
LYMPHOCYTES # BLD MANUAL: 0.2 10E3/UL (ref 0.8–5.3)
LYMPHOCYTES NFR BLD MANUAL: 16 %
MCH RBC QN AUTO: 32.1 PG (ref 26.5–33)
MCHC RBC AUTO-ENTMCNC: 32 G/DL (ref 31.5–36.5)
MCV RBC AUTO: 100 FL (ref 78–100)
MONOCYTES # BLD MANUAL: 0.2 10E3/UL (ref 0–1.3)
MONOCYTES NFR BLD MANUAL: 12 %
NEUTROPHILS # BLD MANUAL: 0.8 10E3/UL (ref 1.6–8.3)
NEUTROPHILS NFR BLD MANUAL: 63 %
PLAT MORPH BLD: ABNORMAL
PLATELET # BLD AUTO: 165 10E3/UL (ref 150–450)
POTASSIUM BLD-SCNC: 3.8 MMOL/L (ref 3.5–5)
RBC # BLD AUTO: 3.15 10E6/UL (ref 3.8–5.2)
RBC MORPH BLD: ABNORMAL
SODIUM SERPL-SCNC: 144 MMOL/L (ref 136–145)
WBC # BLD AUTO: 1.3 10E3/UL (ref 4–11)

## 2022-01-07 PROCEDURE — 36415 COLL VENOUS BLD VENIPUNCTURE: CPT | Mod: ORL

## 2022-01-07 PROCEDURE — 80048 BASIC METABOLIC PNL TOTAL CA: CPT | Mod: ORL

## 2022-01-07 PROCEDURE — 85027 COMPLETE CBC AUTOMATED: CPT | Mod: ORL

## 2022-01-11 ENCOUNTER — LAB REQUISITION (OUTPATIENT)
Dept: LAB | Facility: CLINIC | Age: 77
End: 2022-01-11
Payer: MEDICARE

## 2022-01-11 ENCOUNTER — PATIENT OUTREACH (OUTPATIENT)
Dept: ONCOLOGY | Facility: CLINIC | Age: 77
End: 2022-01-11

## 2022-01-11 DIAGNOSIS — T45.1X5A ADVERSE EFFECT OF ANTINEOPLASTIC AND IMMUNOSUPPRESSIVE DRUGS, INITIAL ENCOUNTER: ICD-10-CM

## 2022-01-11 DIAGNOSIS — R11.2 NAUSEA WITH VOMITING, UNSPECIFIED: ICD-10-CM

## 2022-01-11 DIAGNOSIS — C71.9 GBM (GLIOBLASTOMA MULTIFORME) (H): ICD-10-CM

## 2022-01-11 NOTE — PROGRESS NOTES
Writer received a request for a new script of Xeralto .    This has been reordered and routed to Dr. Gamboa to review and sign.    Elizabeth Ricci RN

## 2022-01-12 LAB
ALBUMIN SERPL-MCNC: 3.6 G/DL (ref 3.5–5)
ALP SERPL-CCNC: 101 U/L (ref 45–120)
ALT SERPL W P-5'-P-CCNC: <9 U/L (ref 0–45)
ANION GAP SERPL CALCULATED.3IONS-SCNC: 12 MMOL/L (ref 5–18)
AST SERPL W P-5'-P-CCNC: 15 U/L (ref 0–40)
BASOPHILS # BLD MANUAL: 0 10E3/UL (ref 0–0.2)
BASOPHILS NFR BLD MANUAL: 0 %
BILIRUB SERPL-MCNC: 0.5 MG/DL (ref 0–1)
BUN SERPL-MCNC: 16 MG/DL (ref 8–28)
CALCIUM SERPL-MCNC: 9.4 MG/DL (ref 8.5–10.5)
CHLORIDE BLD-SCNC: 104 MMOL/L (ref 98–107)
CO2 SERPL-SCNC: 27 MMOL/L (ref 22–31)
CREAT SERPL-MCNC: 1.02 MG/DL (ref 0.6–1.1)
ELLIPTOCYTES BLD QL SMEAR: SLIGHT
EOSINOPHIL # BLD MANUAL: 0.2 10E3/UL (ref 0–0.7)
EOSINOPHIL NFR BLD MANUAL: 16 %
ERYTHROCYTE [DISTWIDTH] IN BLOOD BY AUTOMATED COUNT: 12.6 % (ref 10–15)
GFR SERPL CREATININE-BSD FRML MDRD: 57 ML/MIN/1.73M2
GLUCOSE BLD-MCNC: 89 MG/DL (ref 70–125)
HCT VFR BLD AUTO: 32.4 % (ref 35–47)
HGB BLD-MCNC: 10.8 G/DL (ref 11.7–15.7)
LYMPHOCYTES # BLD MANUAL: 0.3 10E3/UL (ref 0.8–5.3)
LYMPHOCYTES NFR BLD MANUAL: 20 %
MCH RBC QN AUTO: 33.2 PG (ref 26.5–33)
MCHC RBC AUTO-ENTMCNC: 33.3 G/DL (ref 31.5–36.5)
MCV RBC AUTO: 100 FL (ref 78–100)
MONOCYTES # BLD MANUAL: 0.2 10E3/UL (ref 0–1.3)
MONOCYTES NFR BLD MANUAL: 14 %
NEUTROPHILS # BLD MANUAL: 0.8 10E3/UL (ref 1.6–8.3)
NEUTROPHILS NFR BLD MANUAL: 50 %
PLAT MORPH BLD: ABNORMAL
PLATELET # BLD AUTO: 207 10E3/UL (ref 150–450)
POTASSIUM BLD-SCNC: 3.2 MMOL/L (ref 3.5–5)
PROT SERPL-MCNC: 6.7 G/DL (ref 6–8)
RBC # BLD AUTO: 3.25 10E6/UL (ref 3.8–5.2)
RBC MORPH BLD: ABNORMAL
SODIUM SERPL-SCNC: 143 MMOL/L (ref 136–145)
WBC # BLD AUTO: 1.5 10E3/UL (ref 4–11)

## 2022-01-12 PROCEDURE — 85027 COMPLETE CBC AUTOMATED: CPT | Mod: ORL | Performed by: PSYCHIATRY & NEUROLOGY

## 2022-01-12 PROCEDURE — 36415 COLL VENOUS BLD VENIPUNCTURE: CPT | Mod: ORL | Performed by: PSYCHIATRY & NEUROLOGY

## 2022-01-12 PROCEDURE — 80053 COMPREHEN METABOLIC PANEL: CPT | Mod: ORL | Performed by: PSYCHIATRY & NEUROLOGY

## 2022-01-12 PROCEDURE — P9603 ONE-WAY ALLOW PRORATED MILES: HCPCS | Mod: ORL | Performed by: PSYCHIATRY & NEUROLOGY

## 2022-01-13 ENCOUNTER — LAB REQUISITION (OUTPATIENT)
Dept: LAB | Facility: CLINIC | Age: 77
End: 2022-01-13
Payer: MEDICARE

## 2022-01-13 DIAGNOSIS — D64.89 OTHER SPECIFIED ANEMIAS: ICD-10-CM

## 2022-01-14 LAB
FERRITIN SERPL-MCNC: 440 NG/ML (ref 10–130)
FOLATE SERPL-MCNC: 4.8 NG/ML
IRON SATN MFR SERPL: 34 % (ref 20–50)
IRON SERPL-MCNC: 61 UG/DL (ref 42–175)
RETICS # AUTO: 0.03 10E6/UL (ref 0.01–0.11)
RETICS/RBC NFR AUTO: 1.1 % (ref 0.8–2.7)
TIBC SERPL-MCNC: 179 UG/DL (ref 313–563)
TRANSFERRIN SERPL-MCNC: 143 MG/DL (ref 212–360)
VIT B12 SERPL-MCNC: 264 PG/ML (ref 213–816)

## 2022-01-14 PROCEDURE — 83540 ASSAY OF IRON: CPT | Mod: ORL

## 2022-01-14 PROCEDURE — 82728 ASSAY OF FERRITIN: CPT | Mod: ORL

## 2022-01-14 PROCEDURE — 85045 AUTOMATED RETICULOCYTE COUNT: CPT | Mod: ORL

## 2022-01-14 PROCEDURE — P9603 ONE-WAY ALLOW PRORATED MILES: HCPCS | Mod: ORL

## 2022-01-14 PROCEDURE — 82607 VITAMIN B-12: CPT | Mod: ORL

## 2022-01-14 PROCEDURE — 36415 COLL VENOUS BLD VENIPUNCTURE: CPT | Mod: ORL

## 2022-01-14 PROCEDURE — 82746 ASSAY OF FOLIC ACID SERUM: CPT | Mod: ORL

## 2022-01-17 ENCOUNTER — MEDICAL CORRESPONDENCE (OUTPATIENT)
Dept: HEALTH INFORMATION MANAGEMENT | Facility: CLINIC | Age: 77
End: 2022-01-17

## 2022-01-18 ENCOUNTER — IMMUNIZATION (OUTPATIENT)
Dept: NURSING | Facility: CLINIC | Age: 77
End: 2022-01-18
Payer: MEDICARE

## 2022-01-18 ENCOUNTER — LAB REQUISITION (OUTPATIENT)
Dept: LAB | Facility: CLINIC | Age: 77
End: 2022-01-18
Payer: MEDICARE

## 2022-01-18 DIAGNOSIS — T45.1X5A ADVERSE EFFECT OF ANTINEOPLASTIC AND IMMUNOSUPPRESSIVE DRUGS, INITIAL ENCOUNTER: ICD-10-CM

## 2022-01-18 DIAGNOSIS — R11.2 NAUSEA WITH VOMITING, UNSPECIFIED: ICD-10-CM

## 2022-01-18 PROCEDURE — 0013A PR COVID VAC MODERNA 100 MCG/0.5 ML IM: CPT

## 2022-01-18 PROCEDURE — 91301 PR COVID VAC MODERNA 100 MCG/0.5 ML IM: CPT

## 2022-01-20 LAB
ALBUMIN SERPL-MCNC: 3.5 G/DL (ref 3.5–5)
ALP SERPL-CCNC: 96 U/L (ref 45–120)
ALT SERPL W P-5'-P-CCNC: <9 U/L (ref 0–45)
ANION GAP SERPL CALCULATED.3IONS-SCNC: 15 MMOL/L (ref 5–18)
AST SERPL W P-5'-P-CCNC: 15 U/L (ref 0–40)
BASOPHILS # BLD MANUAL: 0 10E3/UL (ref 0–0.2)
BASOPHILS NFR BLD MANUAL: 2 %
BILIRUB SERPL-MCNC: 0.3 MG/DL (ref 0–1)
BUN SERPL-MCNC: 19 MG/DL (ref 8–28)
CALCIUM SERPL-MCNC: 9.1 MG/DL (ref 8.5–10.5)
CHLORIDE BLD-SCNC: 106 MMOL/L (ref 98–107)
CO2 SERPL-SCNC: 22 MMOL/L (ref 22–31)
CREAT SERPL-MCNC: 1.12 MG/DL (ref 0.6–1.1)
ELLIPTOCYTES BLD QL SMEAR: SLIGHT
EOSINOPHIL # BLD MANUAL: 0.1 10E3/UL (ref 0–0.7)
EOSINOPHIL NFR BLD MANUAL: 5 %
ERYTHROCYTE [DISTWIDTH] IN BLOOD BY AUTOMATED COUNT: 12.6 % (ref 10–15)
GFR SERPL CREATININE-BSD FRML MDRD: 51 ML/MIN/1.73M2
GLUCOSE BLD-MCNC: 92 MG/DL (ref 70–125)
HCT VFR BLD AUTO: 32 % (ref 35–47)
HGB BLD-MCNC: 10.5 G/DL (ref 11.7–15.7)
LYMPHOCYTES # BLD MANUAL: 0.3 10E3/UL (ref 0.8–5.3)
LYMPHOCYTES NFR BLD MANUAL: 14 %
MCH RBC QN AUTO: 32.7 PG (ref 26.5–33)
MCHC RBC AUTO-ENTMCNC: 32.8 G/DL (ref 31.5–36.5)
MCV RBC AUTO: 100 FL (ref 78–100)
METAMYELOCYTES # BLD MANUAL: 0 10E3/UL
METAMYELOCYTES NFR BLD MANUAL: 1 %
MONOCYTES # BLD MANUAL: 0.5 10E3/UL (ref 0–1.3)
MONOCYTES NFR BLD MANUAL: 23 %
NEUTROPHILS # BLD MANUAL: 1.2 10E3/UL (ref 1.6–8.3)
NEUTROPHILS NFR BLD MANUAL: 55 %
PLAT MORPH BLD: ABNORMAL
PLATELET # BLD AUTO: 170 10E3/UL (ref 150–450)
POTASSIUM BLD-SCNC: 3.5 MMOL/L (ref 3.5–5)
PROT SERPL-MCNC: 6.1 G/DL (ref 6–8)
RBC # BLD AUTO: 3.21 10E6/UL (ref 3.8–5.2)
RBC MORPH BLD: ABNORMAL
SODIUM SERPL-SCNC: 143 MMOL/L (ref 136–145)
WBC # BLD AUTO: 2.1 10E3/UL (ref 4–11)

## 2022-01-20 PROCEDURE — 85027 COMPLETE CBC AUTOMATED: CPT | Mod: ORL | Performed by: PSYCHIATRY & NEUROLOGY

## 2022-01-20 PROCEDURE — 36415 COLL VENOUS BLD VENIPUNCTURE: CPT | Mod: ORL | Performed by: PSYCHIATRY & NEUROLOGY

## 2022-01-20 PROCEDURE — 80053 COMPREHEN METABOLIC PANEL: CPT | Mod: ORL | Performed by: PSYCHIATRY & NEUROLOGY

## 2022-01-25 ENCOUNTER — TELEPHONE (OUTPATIENT)
Dept: ONCOLOGY | Facility: CLINIC | Age: 77
End: 2022-01-25
Payer: MEDICARE

## 2022-01-25 NOTE — TELEPHONE ENCOUNTER
M for daughter, La Nena to call. Dr. Baker will be out of clinic on 2/22/22. Please reschedule to March 1st or next date available.

## 2022-02-02 ENCOUNTER — LAB REQUISITION (OUTPATIENT)
Dept: LAB | Facility: CLINIC | Age: 77
End: 2022-02-02
Payer: MEDICARE

## 2022-02-02 DIAGNOSIS — T45.1X5A ADVERSE EFFECT OF ANTINEOPLASTIC AND IMMUNOSUPPRESSIVE DRUGS, INITIAL ENCOUNTER: ICD-10-CM

## 2022-02-02 DIAGNOSIS — R11.2 NAUSEA WITH VOMITING, UNSPECIFIED: ICD-10-CM

## 2022-02-03 ENCOUNTER — VIRTUAL VISIT (OUTPATIENT)
Dept: ONCOLOGY | Facility: CLINIC | Age: 77
End: 2022-02-03
Attending: STUDENT IN AN ORGANIZED HEALTH CARE EDUCATION/TRAINING PROGRAM
Payer: MEDICARE

## 2022-02-03 DIAGNOSIS — Z51.5 PALLIATIVE CARE PATIENT: ICD-10-CM

## 2022-02-03 DIAGNOSIS — F43.21 GRIEF: ICD-10-CM

## 2022-02-03 DIAGNOSIS — F43.23 ADJUSTMENT DISORDER WITH MIXED ANXIETY AND DEPRESSED MOOD: Primary | ICD-10-CM

## 2022-02-03 LAB
ALBUMIN SERPL-MCNC: 3 G/DL (ref 3.5–5)
ALP SERPL-CCNC: 90 U/L (ref 45–120)
ALT SERPL W P-5'-P-CCNC: <9 U/L (ref 0–45)
ANION GAP SERPL CALCULATED.3IONS-SCNC: 9 MMOL/L (ref 5–18)
AST SERPL W P-5'-P-CCNC: 12 U/L (ref 0–40)
BASOPHILS # BLD MANUAL: 0 10E3/UL (ref 0–0.2)
BASOPHILS NFR BLD MANUAL: 0 %
BILIRUB SERPL-MCNC: 0.4 MG/DL (ref 0–1)
BUN SERPL-MCNC: 20 MG/DL (ref 8–28)
CALCIUM SERPL-MCNC: 9.2 MG/DL (ref 8.5–10.5)
CHLORIDE BLD-SCNC: 107 MMOL/L (ref 98–107)
CO2 SERPL-SCNC: 27 MMOL/L (ref 22–31)
CREAT SERPL-MCNC: 1.29 MG/DL (ref 0.6–1.1)
ELLIPTOCYTES BLD QL SMEAR: SLIGHT
EOSINOPHIL # BLD MANUAL: 0.2 10E3/UL (ref 0–0.7)
EOSINOPHIL NFR BLD MANUAL: 7 %
ERYTHROCYTE [DISTWIDTH] IN BLOOD BY AUTOMATED COUNT: 12.2 % (ref 10–15)
GFR SERPL CREATININE-BSD FRML MDRD: 43 ML/MIN/1.73M2
GLUCOSE BLD-MCNC: 91 MG/DL (ref 70–125)
HCT VFR BLD AUTO: 28.5 % (ref 35–47)
HGB BLD-MCNC: 9.5 G/DL (ref 11.7–15.7)
LYMPHOCYTES # BLD MANUAL: 0.2 10E3/UL (ref 0.8–5.3)
LYMPHOCYTES NFR BLD MANUAL: 6 %
MCH RBC QN AUTO: 33.3 PG (ref 26.5–33)
MCHC RBC AUTO-ENTMCNC: 33.3 G/DL (ref 31.5–36.5)
MCV RBC AUTO: 100 FL (ref 78–100)
MONOCYTES # BLD MANUAL: 0.5 10E3/UL (ref 0–1.3)
MONOCYTES NFR BLD MANUAL: 14 %
NEUTROPHILS # BLD MANUAL: 2.6 10E3/UL (ref 1.6–8.3)
NEUTROPHILS NFR BLD MANUAL: 73 %
PLAT MORPH BLD: ABNORMAL
PLATELET # BLD AUTO: 154 10E3/UL (ref 150–450)
POTASSIUM BLD-SCNC: 3.6 MMOL/L (ref 3.5–5)
PROT SERPL-MCNC: 5.5 G/DL (ref 6–8)
RBC # BLD AUTO: 2.85 10E6/UL (ref 3.8–5.2)
RBC MORPH BLD: ABNORMAL
SODIUM SERPL-SCNC: 143 MMOL/L (ref 136–145)
WBC # BLD AUTO: 3.5 10E3/UL (ref 4–11)

## 2022-02-03 PROCEDURE — P9603 ONE-WAY ALLOW PRORATED MILES: HCPCS | Mod: ORL | Performed by: PSYCHIATRY & NEUROLOGY

## 2022-02-03 PROCEDURE — 85027 COMPLETE CBC AUTOMATED: CPT | Mod: ORL | Performed by: PSYCHIATRY & NEUROLOGY

## 2022-02-03 PROCEDURE — 36415 COLL VENOUS BLD VENIPUNCTURE: CPT | Mod: ORL | Performed by: PSYCHIATRY & NEUROLOGY

## 2022-02-03 PROCEDURE — 90791 PSYCH DIAGNOSTIC EVALUATION: CPT | Mod: GT | Performed by: SOCIAL WORKER

## 2022-02-03 PROCEDURE — 80053 COMPREHEN METABOLIC PANEL: CPT | Mod: ORL | Performed by: PSYCHIATRY & NEUROLOGY
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
## 2022-02-04 ENCOUNTER — LAB REQUISITION (OUTPATIENT)
Dept: LAB | Facility: CLINIC | Age: 77
End: 2022-02-04
Payer: MEDICARE

## 2022-02-04 ENCOUNTER — TRANSFERRED RECORDS (OUTPATIENT)
Dept: HEALTH INFORMATION MANAGEMENT | Facility: CLINIC | Age: 77
End: 2022-02-04

## 2022-02-04 DIAGNOSIS — N39.46 MIXED INCONTINENCE: ICD-10-CM

## 2022-02-04 LAB
ALBUMIN UR-MCNC: 30 MG/DL
APPEARANCE UR: ABNORMAL
BACTERIA #/AREA URNS HPF: ABNORMAL /HPF
BILIRUB UR QL STRIP: NEGATIVE
COLOR UR AUTO: YELLOW
GLUCOSE UR STRIP-MCNC: NEGATIVE MG/DL
HGB UR QL STRIP: ABNORMAL
KETONES UR STRIP-MCNC: NEGATIVE MG/DL
LEUKOCYTE ESTERASE UR QL STRIP: ABNORMAL
MUCOUS THREADS #/AREA URNS LPF: PRESENT /LPF
NITRATE UR QL: NEGATIVE
PH UR STRIP: 5.5 [PH] (ref 5–7)
RBC URINE: 5 /HPF
SP GR UR STRIP: 1.01 (ref 1–1.03)
SQUAMOUS EPITHELIAL: <1 /HPF
UROBILINOGEN UR STRIP-MCNC: <2 MG/DL
WBC CLUMPS #/AREA URNS HPF: PRESENT /HPF
WBC URINE: >182 /HPF

## 2022-02-04 PROCEDURE — 81001 URINALYSIS AUTO W/SCOPE: CPT | Mod: ORL

## 2022-02-04 PROCEDURE — 87086 URINE CULTURE/COLONY COUNT: CPT | Mod: ORL

## 2022-02-06 LAB — BACTERIA UR CULT: ABNORMAL

## 2022-02-07 ENCOUNTER — TELEPHONE (OUTPATIENT)
Dept: ONCOLOGY | Facility: CLINIC | Age: 77
End: 2022-02-07
Payer: MEDICARE

## 2022-02-07 NOTE — TELEPHONE ENCOUNTER
"Per Dr. Baker, \"I am not concerned for an intracranial process, more concerned for infection.   Can you please have NH get a UA + culture. Not sure if CXR is needed for evaluation of PNA.   Can have pt see Shazia or Capo GARCÍA for evaluation this week or me at 8AM on 2/15.     Including Dr. Agudelo with cancer rehab.   If pt is discharged from PT, this usually means that pt is not benefiting or insurance coverage has stopped. A repeat referral to not typically helpful.\"    Left a voicemail for ELEANOR Jimenes at assisted living facility requesting a UA/UC. Patient scheduled for appointment tomorrow at 2:30 PM with Capo. Confirmed with La Nena that this would work for them.     Radha Mi, RN  Triage Nurse Advisor  [facility]  "

## 2022-02-07 NOTE — TELEPHONE ENCOUNTER
Patient's daughter, La Nena, called inquiring about moving up patient's MRI. Reports that patient has had a gradual decline over a few weeks. La Nena first noticed increased mobility issues around New Year's Sharon but decided to wait it out. Now, when she walks she is very weak, she shuffles, and is not able to stand straight up. She needs constant reminders about using the walker correctly. She was discharged from PT about a week ago and La Nena is concerned about this. La Nena would like a new order for PT. Also notices some increased word finding difficulty and short attention span. Denies headaches. Vision is getting blurry but thinks she needs new glasses. No double vision. Reports more bladder incontinence and intermittent bowel incontinence - once she feels the urge to go to the bathroom she can't make it fast enough.    Will route to Dr. Baker for recommendations.    Radha Mi, RN  Triage Nurse Advisor  [facility]

## 2022-02-07 NOTE — TELEPHONE ENCOUNTER
"UA/UC from performed at assisted living facility on 2/4 was positive.    Per Dr. Baker, \"Recommend treating the infection and monitoring for now.   Would defer to PCP or NH care provider for antibiotic prescribing.\"    Spoke to ELEANOR Jimenes at assisted living facility who faxed results of UA/UC to patient's PCP and will follow up on getting antibiotics prescribed.    Cancelled appointment with Capo tomorrow and informed patient's daughter in law, Sheridan, of the plan. Sheridan will contact La Nena to update her.    Radha Mi, RN  Triage Nurse Advisor  [facility]  "

## 2022-02-14 ENCOUNTER — APPOINTMENT (OUTPATIENT)
Dept: CT IMAGING | Facility: CLINIC | Age: 77
End: 2022-02-14
Attending: EMERGENCY MEDICINE
Payer: MEDICARE

## 2022-02-14 ENCOUNTER — HOSPITAL ENCOUNTER (EMERGENCY)
Facility: CLINIC | Age: 77
Discharge: HOME | End: 2022-02-14
Attending: EMERGENCY MEDICINE | Admitting: EMERGENCY MEDICINE
Payer: MEDICARE

## 2022-02-14 ENCOUNTER — APPOINTMENT (OUTPATIENT)
Dept: MRI IMAGING | Facility: CLINIC | Age: 77
End: 2022-02-14
Attending: EMERGENCY MEDICINE
Payer: MEDICARE

## 2022-02-14 ENCOUNTER — APPOINTMENT (OUTPATIENT)
Dept: ULTRASOUND IMAGING | Facility: CLINIC | Age: 77
End: 2022-02-14
Attending: EMERGENCY MEDICINE
Payer: MEDICARE

## 2022-02-14 VITALS
RESPIRATION RATE: 16 BRPM | HEART RATE: 76 BPM | OXYGEN SATURATION: 98 % | BODY MASS INDEX: 24.13 KG/M2 | DIASTOLIC BLOOD PRESSURE: 85 MMHG | SYSTOLIC BLOOD PRESSURE: 134 MMHG | TEMPERATURE: 98.4 F | WEIGHT: 145 LBS

## 2022-02-14 DIAGNOSIS — R29.90 NEUROLOGICAL SYMPTOMS: ICD-10-CM

## 2022-02-14 LAB
ALBUMIN UR-MCNC: 10 MG/DL
ANION GAP SERPL CALCULATED.3IONS-SCNC: 5 MMOL/L (ref 3–14)
APPEARANCE UR: CLEAR
BASOPHILS # BLD AUTO: 0.1 10E3/UL (ref 0–0.2)
BASOPHILS NFR BLD AUTO: 1 %
BILIRUB UR QL STRIP: NEGATIVE
BUN SERPL-MCNC: 31 MG/DL (ref 7–30)
CALCIUM SERPL-MCNC: 9.3 MG/DL (ref 8.5–10.1)
CHLORIDE BLD-SCNC: 107 MMOL/L (ref 94–109)
CO2 SERPL-SCNC: 30 MMOL/L (ref 20–32)
COLOR UR AUTO: ABNORMAL
CREAT SERPL-MCNC: 1.44 MG/DL (ref 0.52–1.04)
EOSINOPHIL # BLD AUTO: 0.4 10E3/UL (ref 0–0.7)
EOSINOPHIL NFR BLD AUTO: 7 %
ERYTHROCYTE [DISTWIDTH] IN BLOOD BY AUTOMATED COUNT: 12.4 % (ref 10–15)
GFR SERPL CREATININE-BSD FRML MDRD: 38 ML/MIN/1.73M2
GLUCOSE BLD-MCNC: 95 MG/DL (ref 70–99)
GLUCOSE UR STRIP-MCNC: NEGATIVE MG/DL
HCT VFR BLD AUTO: 33.5 % (ref 35–47)
HGB BLD-MCNC: 10.6 G/DL (ref 11.7–15.7)
HGB UR QL STRIP: NEGATIVE
HOLD SPECIMEN: NORMAL
IMM GRANULOCYTES # BLD: 0.1 10E3/UL
IMM GRANULOCYTES NFR BLD: 2 %
INR PPP: 1.44 (ref 0.85–1.15)
KETONES UR STRIP-MCNC: NEGATIVE MG/DL
LEUKOCYTE ESTERASE UR QL STRIP: NEGATIVE
LYMPHOCYTES # BLD AUTO: 0.3 10E3/UL (ref 0.8–5.3)
LYMPHOCYTES NFR BLD AUTO: 6 %
MCH RBC QN AUTO: 32.6 PG (ref 26.5–33)
MCHC RBC AUTO-ENTMCNC: 31.6 G/DL (ref 31.5–36.5)
MCV RBC AUTO: 103 FL (ref 78–100)
MONOCYTES # BLD AUTO: 0.5 10E3/UL (ref 0–1.3)
MONOCYTES NFR BLD AUTO: 9 %
NEUTROPHILS # BLD AUTO: 4.1 10E3/UL (ref 1.6–8.3)
NEUTROPHILS NFR BLD AUTO: 75 %
NITRATE UR QL: NEGATIVE
NRBC # BLD AUTO: 0 10E3/UL
NRBC BLD AUTO-RTO: 0 /100
PH UR STRIP: 7 [PH] (ref 5–7)
PLATELET # BLD AUTO: 195 10E3/UL (ref 150–450)
POTASSIUM BLD-SCNC: 4.1 MMOL/L (ref 3.4–5.3)
RADIOLOGIST FLAGS: NORMAL
RBC # BLD AUTO: 3.25 10E6/UL (ref 3.8–5.2)
RBC URINE: 1 /HPF
SODIUM SERPL-SCNC: 142 MMOL/L (ref 133–144)
SP GR UR STRIP: 1.01 (ref 1–1.03)
TRANSITIONAL EPI: <1 /HPF
UROBILINOGEN UR STRIP-MCNC: NORMAL MG/DL
WBC # BLD AUTO: 5.4 10E3/UL (ref 4–11)
WBC URINE: 0 /HPF

## 2022-02-14 PROCEDURE — 93970 EXTREMITY STUDY: CPT | Mod: 26 | Performed by: RADIOLOGY

## 2022-02-14 PROCEDURE — 99285 EMERGENCY DEPT VISIT HI MDM: CPT | Mod: 25 | Performed by: EMERGENCY MEDICINE

## 2022-02-14 PROCEDURE — 81001 URINALYSIS AUTO W/SCOPE: CPT | Performed by: EMERGENCY MEDICINE

## 2022-02-14 PROCEDURE — A9585 GADOBUTROL INJECTION: HCPCS | Performed by: EMERGENCY MEDICINE

## 2022-02-14 PROCEDURE — 70553 MRI BRAIN STEM W/O & W/DYE: CPT | Mod: MG

## 2022-02-14 PROCEDURE — G1004 CDSM NDSC: HCPCS | Mod: GC | Performed by: RADIOLOGY

## 2022-02-14 PROCEDURE — 85610 PROTHROMBIN TIME: CPT | Performed by: EMERGENCY MEDICINE

## 2022-02-14 PROCEDURE — 93970 EXTREMITY STUDY: CPT

## 2022-02-14 PROCEDURE — 85025 COMPLETE CBC W/AUTO DIFF WBC: CPT | Performed by: EMERGENCY MEDICINE

## 2022-02-14 PROCEDURE — 80048 BASIC METABOLIC PNL TOTAL CA: CPT | Performed by: EMERGENCY MEDICINE

## 2022-02-14 PROCEDURE — 70450 CT HEAD/BRAIN W/O DYE: CPT | Mod: 26 | Performed by: RADIOLOGY

## 2022-02-14 PROCEDURE — 70553 MRI BRAIN STEM W/O & W/DYE: CPT | Mod: 26 | Performed by: RADIOLOGY

## 2022-02-14 PROCEDURE — 255N000002 HC RX 255 OP 636: Performed by: EMERGENCY MEDICINE

## 2022-02-14 PROCEDURE — 36415 COLL VENOUS BLD VENIPUNCTURE: CPT | Performed by: EMERGENCY MEDICINE

## 2022-02-14 PROCEDURE — 99284 EMERGENCY DEPT VISIT MOD MDM: CPT | Performed by: EMERGENCY MEDICINE

## 2022-02-14 PROCEDURE — G1004 CDSM NDSC: HCPCS

## 2022-02-14 RX ORDER — GADOBUTROL 604.72 MG/ML
6.5 INJECTION INTRAVENOUS ONCE
Status: COMPLETED | OUTPATIENT
Start: 2022-02-14 | End: 2022-02-14

## 2022-02-14 RX ADMIN — GADOBUTROL 6.5 ML: 604.72 INJECTION INTRAVENOUS at 17:51

## 2022-02-14 ASSESSMENT — ENCOUNTER SYMPTOMS
SPEECH DIFFICULTY: 1
HEADACHES: 0
WEAKNESS: 1
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
## 2022-02-14 NOTE — ED NOTES
I took signout on the patient from Dr. Villarreal.  This is a 76-year-old female with right-sided weakness and difficulty with posture.  Patient has a history of glioblastoma with resection.  CT showed no acute changes.  Patient was signed out to me pending MRI results.  MRI shows no acute stroke or recurrence of malignancy.  There is potential aneurysm.  I discussed this with Neurology recommends follow-up in 2 to 4 weeks to continue monitoring.  As patient has history of recent UTI with treatment as well as history of DVT and concern for peripheral edema we obtained ultrasound which shows no new DVT.  Urine shows that recent UTI has likely been cleared.  On re-exam patient has equal strength bilaterally. Will discharge home with return precautions. Discussed reasons to return to the emergency department.  Patient understands and agrees with this plan.     Duke Daniel,   02/15/22 0040

## 2022-02-14 NOTE — ED TRIAGE NOTES
"Per pts' son pt has started \"Learning during dinner\" beginning last night. Pt speech clear, generalized weakness +   "

## 2022-02-15 NOTE — DISCHARGE INSTRUCTIONS
Continue with scheduled Neurology follow-up.    Check your blood pressure daily.    Return to the emergency department if there are any new symptoms or any cause for concern.     Please make an appointment to follow up with Neurology Clinic (phone: [phone]) as needed.

## 2022-02-16 ENCOUNTER — TELEPHONE (OUTPATIENT)
Dept: OTHER | Facility: CLINIC | Age: 77
End: 2022-02-16
Payer: MEDICARE

## 2022-02-16 ENCOUNTER — TELEPHONE (OUTPATIENT)
Dept: ONCOLOGY | Facility: CLINIC | Age: 77
End: 2022-02-16
Payer: MEDICARE

## 2022-02-16 ENCOUNTER — TELEPHONE (OUTPATIENT)
Dept: NEUROSURGERY | Facility: CLINIC | Age: 77
End: 2022-02-16
Payer: MEDICARE

## 2022-02-16 DIAGNOSIS — I67.1 CEREBRAL ANEURYSM, NONRUPTURED: Primary | ICD-10-CM

## 2022-02-16 NOTE — TELEPHONE ENCOUNTER
[facility] Call Center    Phone Message    May a detailed message be left on voicemail: yes     Reason for Call: Appointment Intake    Referring Provider Name: Stephanie Baker MD   Diagnosis and/or Symptoms: Cerebral aneurysm, nonruptured     Daughter La Nena called to schedule appt with Dr. Hardin as indicated by referral. Referral also indicates it should be within 3-5 days.    Please call Lariss back to schedule.    Action Taken: Message routed to:  [facility] ([facility] Neurosurgery    Travel Screening: Not Applicable

## 2022-02-16 NOTE — TELEPHONE ENCOUNTER
Writer calling La Nena to follow up on recommendations from Dr. Baker pertaining to new brain aneurysm.    Please cancel MRI for later this month.   Ok to keep appt with me.     Please have pt seen urgently with Dr. Hardin in neurosurgery for evaluation and recommendation on management.     Stephanie Baker MD   Neuro-oncology   2/16/2022       La Nena understands plan and is in agreement. Referral has been placed to see Dr. Hardin and La Nena will call to make urgent apt. MRI has been canceled. Pt will follow up with Dr. Baker on 3/1.    UTI symptoms have resolved, and UA done on 2/14 improved per La Nena.

## 2022-02-16 NOTE — TELEPHONE ENCOUNTER
[facility]    Who is the name of the provider?:  Requesting Geovanni       What is the location you see this provider at?: [place]    Reason for call:  Seen [facility] ED 2/14 for neuro changes.  Stroke ruled out, MRI Results show brain aneurysm.  Neuro recommended follow up with vascular.  Daughter requesting Dr. Galarza.    Can we leave a detailed message on this number?  YES

## 2022-02-16 NOTE — TELEPHONE ENCOUNTER
Patient's daughter La Nena Anderson (RN at [employer]) requesting Dr. Galarza.    Called La Nena and discussed that patient's aneurysm is intracranial and suggested she call [facility] (Dr. Burger).  Provided contact information - Phone [phone]    MR Brain w/o and w contrast 2/14/22:    3. 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     Belen Wagner RN BSN  [facility]  [phone]

## 2022-02-17 ENCOUNTER — LAB REQUISITION (OUTPATIENT)
Dept: LAB | Facility: CLINIC | Age: 77
End: 2022-02-17
Payer: MEDICARE

## 2022-02-17 ENCOUNTER — TELEPHONE (OUTPATIENT)
Dept: OTHER | Facility: CLINIC | Age: 77
End: 2022-02-17

## 2022-02-17 DIAGNOSIS — N39.46 MIXED INCONTINENCE: ICD-10-CM

## 2022-02-17 DIAGNOSIS — I67.1 CEREBRAL ANEURYSM, NONRUPTURED: Primary | ICD-10-CM

## 2022-02-17 LAB
ALBUMIN UR-MCNC: 20 MG/DL
APPEARANCE UR: CLEAR
BILIRUB UR QL STRIP: NEGATIVE
COLOR UR AUTO: ABNORMAL
GLUCOSE UR STRIP-MCNC: NEGATIVE MG/DL
HGB UR QL STRIP: ABNORMAL
HYALINE CASTS: 1 /LPF
KETONES UR STRIP-MCNC: NEGATIVE MG/DL
LEUKOCYTE ESTERASE UR QL STRIP: ABNORMAL
MUCOUS THREADS #/AREA URNS LPF: PRESENT /LPF
NITRATE UR QL: NEGATIVE
PH UR STRIP: 5 [PH] (ref 5–7)
RBC URINE: <1 /HPF
SP GR UR STRIP: 1.01 (ref 1–1.03)
SQUAMOUS EPITHELIAL: <1 /HPF
TRANSITIONAL EPI: <1 /HPF
UROBILINOGEN UR STRIP-MCNC: <2 MG/DL
WBC URINE: 12 /HPF

## 2022-02-17 PROCEDURE — 87086 URINE CULTURE/COLONY COUNT: CPT | Mod: ORL

## 2022-02-17 PROCEDURE — 81001 URINALYSIS AUTO W/SCOPE: CPT | Mod: ORL

## 2022-02-17 NOTE — TELEPHONE ENCOUNTER
FUTURE VISIT INFORMATION      FUTURE VISIT INFORMATION:    Date: 2/17/2022    Time: 2pm    [place] [facility] [place] INFORMATION:    Referring provider:  Duke Daniel     Referring providers clinic:  [facility] ED     Reason for visit/diagnosis  Aneurysm     RECORDS REQUESTED FROM:       Clinic name Comments Records Status Imaging Status   Internal ED Visit-2/4/2022    MR Brain-2/14/2022, 11/16/2021, 8/17/2021, 6/22/2021, 4/26/2021, 2/25/2021, 2/19/2021    CT Head-2/14/2022, 6/27/2021, 5/2/2021, 4/14/2021, 2/21/2021, 2/24/2021    CTA Head/Neck-5/26/2021, 5/11/2021 Epic PACs         [facility] Everywhere Requested to PACS                        2/17/2022-Request for images faxed to [facility] @ 210pm    2/21/2022-[facility] Images now in PACS-MR @ 455am

## 2022-02-17 NOTE — TELEPHONE ENCOUNTER
Patient's daughter is colleague from work and is a nurse in our hospital.  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  I looked at the imaging and the that actually located in the intracranial circulation.  Patient had a craniotomy in February 2021 for a glioma under the care of Dr. Cummings.     My advice is to have this evaluated by one of our neuro interventional radiology colleagues.  I have sent a message to Dr. Frankie Burger.

## 2022-02-18 LAB — BACTERIA UR CULT: NORMAL

## 2022-02-21 ENCOUNTER — VIRTUAL VISIT (OUTPATIENT)
Dept: NEUROSURGERY | Facility: CLINIC | Age: 77
End: 2022-02-21
Payer: MEDICARE

## 2022-02-21 ENCOUNTER — PRE VISIT (OUTPATIENT)
Dept: NEUROSURGERY | Facility: CLINIC | Age: 77
End: 2022-02-21
Payer: MEDICARE

## 2022-02-21 DIAGNOSIS — I67.1 CEREBRAL ANEURYSM, NONRUPTURED: ICD-10-CM

## 2022-02-21 PROCEDURE — 99442 PR PHYSICIAN TELEPHONE EVALUATION 11-20 MIN: CPT | Mod: 95 | Performed by: NEUROLOGICAL SURGERY

## 2022-02-21 NOTE — PROGRESS NOTES
Service Date: 2022    Stephanie Baker MD  [facility] [address]  [address]    RE:  Olga Bailey  MRN:  [id number]  :  1945    Dear Stephanie:    I had the pleasure to talk to Olga's daughter, La Nena Anderson, today by telephone.  She gave consent for this visit.    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  She underwent a complete resection by Mikey Cummings earlier this year.  She has been followed now by you.  Most recent MRI of the brain was done on 2022.  This demonstrates that there is no return of the glioblastoma.  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  She has been referred for further evaluation and management.    Today I had a really nice conversation with Olga's daughter, La Nena, who is a PACU nurse at [employer].  She has been very engaged in Olga's care throughout this journey.    Today I described the MR angiogram findings.  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  Based on the imaging, I do think that this is a PCOM aneurysm versus an infundibulum.  It measures about 3 mm in size.  The risk of rupture of this is around 1% per year and the risk of intervention would be about 5%.  The risk-benefit profile of the small aneurysm like this is such that I would recommend serial observation.  I would recommend an MR angiogram in 2 years.  Obviously, the glioblastoma may significantly impact this and I discussed this with Olga's daughter today. Depending on where she is at in 2 years, her daughter may or may not elect for repeat imaging study at that point in time.  I think this is very reasonable.  If you have any further questions or concerns, please feel free to call me on my cell phone at [phone].    With kindest personal regards,     Duke Hardin MD        D: 2022   T: 2022   MT:  ba    Name:     RADHA RYANNemesio  MRN:      [id number]        Account:      [id number]   :      1945           Service Date: 2022       Document: [id number]

## 2022-02-21 NOTE — PROGRESS NOTES
Olga is a 76 year old who is being evaluated via a billable video visit.    Video-Visit Details    Type of service:  Video Visit    Video Time: 15 min    Originating Location (pt. Location): Home    Distant Location (provider location):  [facility] NEUROSURG[facility]     Platform used for Video Visit: Mirtha    Chief Complaint   Patient presents with     Consult     VIDEO VISIT ALISA Abbasi    Please see dictation for visit details.

## 2022-02-21 NOTE — LETTER
2022       RE: Olga Bailey  [address] [place] [address]     Dear Colleague,    Thank you for referring your patient, Olga Bailey, to the [facility] [place] [facility]. Please see a copy of my visit note below.    Service Date: 2022    Stephanie Baker MD  [facility]  [address] [place] [address]  [id number]    RE:  Olga Bailey  MRN:  [id number]  :  1945    Dear Stephanie:    I had the pleasure to talk to Olga's daughter, La Nena Anderson, today by telephone.  She gave consent for this visit.    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  She underwent a complete resection by Mikey Cummings earlier this year.  She has been followed now by you.  Most recent MRI of the brain was done on 2022.  This demonstrates that there is no return of the glioblastoma.  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  She has been referred for further evaluation and management.    Today I had a really nice conversation with Olga's daughter, La Nena, who is a PACU nurse at [employer].  She has been very engaged in Mulvane's care throughout this journey.    Today I described the MR angiogram findings.  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  Based on the imaging, I do think that this is a PCOM aneurysm versus an infundibulum.  It measures about 3 mm in size.  The risk of rupture of this is around 1% per year and the risk of intervention would be about 5%.  The risk-benefit profile of the small aneurysm like this is such that I would recommend serial observation.  I would recommend an MR angiogram in 2 years.  Obviously, the glioblastoma may significantly impact this and I discussed this with Olga's daughter today. Depending on where she is at in 2 years, her daughter may  or may not elect for repeat imaging study at that point in time.  I think this is very reasonable.  If you have any further questions or concerns, please feel free to call me on my cell phone at [phone].    With kindest personal regards,     Duke Hardin MD        D: 2022   T: 2022   MT: vandana    Name:     OLGA RYAN  MRN:      [id number]        Account:      [id number]   :      1945           Service Date: 2022       Document: [id number]    Olga is a 76 year old who is being evaluated via a billable video visit.    Video-Visit Details    Type of service:  Video Visit    Video Time: 15 min    Originating Location (pt. Location): Home    Distant Location (provider location):  [facility]     Platform used for Video Visit: [facility]    Chief Complaint   Patient presents with     Consult     VIDEO VISIT ALISA Abbasi    Please see dictation for visit details.

## 2022-02-22 NOTE — PATIENT INSTRUCTIONS
I would recommend an MR angiogram in 2 years.  Obviously, the glioblastoma may significantly impact this and I discussed this with Olga's daughter today. Depending on where she is at in 2 years, her daughter may or may not elect for repeat imaging study at that point in time.  I think this is very reasonable.    If you have any questions please contact me at [phone], option 3.    Erich Neal RN, CNRN  Stroke & Endovascular Care Coordinator    Thank you for choosing [facility] for your health care needs.

## 2022-02-23 ENCOUNTER — LAB REQUISITION (OUTPATIENT)
Dept: LAB | Facility: CLINIC | Age: 77
End: 2022-02-23
Payer: MEDICARE

## 2022-02-23 DIAGNOSIS — N39.46 MIXED INCONTINENCE: ICD-10-CM

## 2022-02-23 LAB
ALBUMIN UR-MCNC: 20 MG/DL
APPEARANCE UR: ABNORMAL
BILIRUB UR QL STRIP: NEGATIVE
COLOR UR AUTO: ABNORMAL
GLUCOSE UR STRIP-MCNC: NEGATIVE MG/DL
HGB UR QL STRIP: ABNORMAL
HYALINE CASTS: 3 /LPF
KETONES UR STRIP-MCNC: NEGATIVE MG/DL
LEUKOCYTE ESTERASE UR QL STRIP: ABNORMAL
MUCOUS THREADS #/AREA URNS LPF: PRESENT /LPF
NITRATE UR QL: NEGATIVE
PH UR STRIP: 5 [PH] (ref 5–7)
RBC URINE: 5 /HPF
SP GR UR STRIP: 1.01 (ref 1–1.03)
SQUAMOUS EPITHELIAL: 1 /HPF
TRANSITIONAL EPI: 1 /HPF
UROBILINOGEN UR STRIP-MCNC: <2 MG/DL
WBC URINE: 71 /HPF

## 2022-02-23 PROCEDURE — 87086 URINE CULTURE/COLONY COUNT: CPT | Mod: ORL

## 2022-02-23 PROCEDURE — 81001 URINALYSIS AUTO W/SCOPE: CPT | Mod: ORL

## 2022-02-24 ENCOUNTER — APPOINTMENT (OUTPATIENT)
Dept: CT IMAGING | Facility: CLINIC | Age: 77
End: 2022-02-24
Attending: EMERGENCY MEDICINE
Payer: MEDICARE

## 2022-02-24 ENCOUNTER — APPOINTMENT (OUTPATIENT)
Dept: GENERAL RADIOLOGY | Facility: CLINIC | Age: 77
End: 2022-02-24
Attending: EMERGENCY MEDICINE
Payer: MEDICARE

## 2022-02-24 ENCOUNTER — HOSPITAL ENCOUNTER (EMERGENCY)
Facility: CLINIC | Age: 77
Discharge: HOME | End: 2022-02-24
Attending: EMERGENCY MEDICINE | Admitting: EMERGENCY MEDICINE
Payer: MEDICARE

## 2022-02-24 VITALS
DIASTOLIC BLOOD PRESSURE: 92 MMHG | WEIGHT: 148 LBS | SYSTOLIC BLOOD PRESSURE: 142 MMHG | OXYGEN SATURATION: 94 % | TEMPERATURE: 98.7 F | RESPIRATION RATE: 26 BRPM | BODY MASS INDEX: 24.63 KG/M2 | HEART RATE: 82 BPM
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
## 2022-02-24 DIAGNOSIS — M25.551 HIP PAIN, RIGHT: ICD-10-CM

## 2022-02-24 PROCEDURE — G1004 CDSM NDSC: HCPCS | Performed by: STUDENT IN AN ORGANIZED HEALTH CARE EDUCATION/TRAINING PROGRAM

## 2022-02-24 PROCEDURE — 73502 X-RAY EXAM HIP UNI 2-3 VIEWS: CPT | Mod: 26 | Performed by: RADIOLOGY

## 2022-02-24 PROCEDURE — G1004 CDSM NDSC: HCPCS

## 2022-02-24 PROCEDURE — 99284 EMERGENCY DEPT VISIT MOD MDM: CPT | Mod: 25 | Performed by: EMERGENCY MEDICINE

## 2022-02-24 PROCEDURE — 99284 EMERGENCY DEPT VISIT MOD MDM: CPT | Performed by: EMERGENCY MEDICINE

## 2022-02-24 PROCEDURE — 73502 X-RAY EXAM HIP UNI 2-3 VIEWS: CPT

## 2022-02-24 PROCEDURE — 70450 CT HEAD/BRAIN W/O DYE: CPT | Mod: 26 | Performed by: STUDENT IN AN ORGANIZED HEALTH CARE EDUCATION/TRAINING PROGRAM

## 2022-02-24 ASSESSMENT — ENCOUNTER SYMPTOMS
VOMITING: 0
ABDOMINAL PAIN: 0
BRUISES/BLEEDS EASILY: 0
HEADACHES: 0
FEVER: 0
CONFUSION: 0
EYE REDNESS: 0
ADENOPATHY: 0
SHORTNESS OF BREATH: 0
NAUSEA: 0
ARTHRALGIAS: 1
POLYDIPSIA: 0
NECK PAIN: 0
COLOR CHANGE: 0
DIFFICULTY URINATING: 0
CHILLS: 0
NECK STIFFNESS: 0
JOINT SWELLING: 0

## 2022-02-24 NOTE — ED NOTES
Bed: ED12  Expected date: 2/24/22  Expected time: 8:07 AM  Means of arrival: Ambulance  Comments:  [facility] [phone]    77 y/o Female    Fall  Hip pain    Triaged:  YELLOW  
alone

## 2022-02-24 NOTE — DISCHARGE INSTRUCTIONS
Please make an appointment to follow up with Your Primary Care Provider in 1-2 days if you have any concerns.  If your symptoms return please come back to the emergency department for further evaluation.
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
## 2022-02-25 ENCOUNTER — PATIENT OUTREACH (OUTPATIENT)
Dept: CARE COORDINATION | Facility: CLINIC | Age: 77
End: 2022-02-25
Payer: MEDICARE

## 2022-02-25 DIAGNOSIS — Z71.89 OTHER SPECIFIED COUNSELING: ICD-10-CM

## 2022-02-25 LAB — BACTERIA UR CULT: NO GROWTH

## 2022-02-25 NOTE — PROGRESS NOTES
Clinic Care Coordination Contact  [facility]: Post-Discharge Note  SITUATION                                                      Admission:    Admission Date: 02/24/22   Reason for Admission: Fall Hip Pain  Discharge:   Discharge Date: 02/24/22  Discharge Diagnosis: Fall Hip Pain    BACKGROUND                                                      Per hospital discharge summary and inpatient provider notes:    Olga Bailey is a 76 year old female with history of left frontoparietal glioblastoma s/p gross total resection 2/23/2021 s/p radiation and chemo, cerebral aneurysm who presents via EMS for evaluation after a fall. She woke up this morning at 5 AM and was being helped by an aide to go to the bathroom. She sat up in bed, tried to get up but fell forward, striking her head against the wall before falling onto her right hip.     ASSESSMENT      Enrollment  Primary Care Care Coordination Status: Not a Candidate    Discharge Assessment  How are you doing now that you are home?: feeling well  How are your symptoms? (Red Flag symptoms escalate to triage hotline per guidelines): Improved  Do you feel your condition is stable enough to be safe at home until your provider visit?: Yes  Does the patient have their discharge instructions? : Yes  Does the patient have questions regarding their discharge instructions? : No  Were you started on any new medications or were there changes to any of your previous medications? : No  Does the patient have all of their medications?: Yes  Do you have questions regarding any of your medications? : No  Do you have all of your needed medical supplies or equipment (DME)?  (i.e. oxygen tank, CPAP, cane, etc.): Yes  Discharge follow-up appointment scheduled within 14 calendar days? : Yes  Discharge Follow Up Appointment Date: 03/01/22  Discharge Follow Up Appointment Scheduled with?: Specialty Care Provider    Post-op ([facility] Only)  If the patient had a surgery or procedure, do  they have any questions for a nurse?: No         PLAN                                                      Outpatient Plan:      Please make an appointment to follow up with  Your Primary Care Provider in 1-2 days if you  have any concerns.     Future Appointments   Date Time Provider Department Center   3/1/2022  2:30 PM Stephanie Baker MD [facility]   3/1/2022  3:30 PM Yesenia Agudelo MD [facility]   3/4/2022 11:20 AM Maggi Plaza,  [facility]   3/14/2022  2:00 PM [facility] [place]   6/3/2022 12:00 PM  PFL B [facility]   6/3/2022  1:00 PM Cole Egan MD [facility]         For any urgent concerns, please contact our 24 hour nurse triage line: [phone] ([phone])       AAKSAH Gustafson  [phone]  [facility]

## 2022-02-27 NOTE — PROGRESS NOTES
"NEURO-ONCOLOGY VISIT  Mar 1, 2022    CHIEF COMPLAINT: Ms. Olga Bailey is a 76 year old right-handed woman with a left frontoparietal glioblastoma (IDH wild-type, MGMT promoter methylated), diagnosed following gross total resection on 2/23/2021. Initiation of upfront cancer-directed treatment was delayed due to a complicated hospitalization. Given a resolving infection and lowered functional status, radiation alone was initiated on 5/4/2021 and completed on 5/27/2021.     Olga started adjuvant therapy with 150mg/m2 dosing of temozolomide, however, cycle 1 was complicated by significant hematologic toxicity. Dosing was changed to metronomic/ daily dosing in 7/2021 and this was well tolerated. Imaging from 8/2021 and 11/2021 demonstrates positive treatment effect.    Chemotherapy was placed on a hold in 10/2021 in the setting of severe constipation, but then, resumed. She has since completed the planned 6 months of adjuvant temozolomide as of 12/2021.    Imaging in 2/2022 was without concern. The plan is to continue on imaging surveillance.     Olga is presenting in follow-up accompanied by La Nena (daughter).    HISTORY OF PRESENT ILLNESS  -Olga is fairly well today.  -Sleep is good. Energy is fair during the day; still fatigued with poor stamina.   -No issues with constipation. Appetite is good. No issues with nausea.  -Mood is \"average\", still lacks motivation. Following with palliative care.   -Memory is impaired, difficulty with finding words, confusion at times/ sundowning, and difficulty with concentration.  -She can walk with a walker. Gait is shuffling, not steady. Spending much of the day sitting. Had a recent fall last week; went to ED and CT head was without bleed, x-ray of pelvis was without fracture. Following with Dr. Agudelo; discharged therapies (PT/ OT), but hoping to restart.  -No headaches recently.  -Denies changes in sensation.  -Off dexamethasone.   -No recurrent seizures since her " last visit, tolerating Keppra.  -On anticoagulation with no significant signs/ symptoms of bleeding. Had a bloody nose last week. Less swelling in the legs; improved with compression stocking use.   -Evaluated by Dr. 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    REVIEW OF SYSTEMS  A comprehensive ROS negative except as in HPI.    MEDICATIONS   Current Outpatient Medications   Medication     acetaminophen (TYLENOL) 325 MG tablet     albuterol (PROAIR HFA/PROVENTIL HFA/VENTOLIN HFA) 108 (90 Base) MCG/ACT inhaler     albuterol (PROVENTIL) (2.5 MG/3ML) 0.083% neb solution     amLODIPine (NORVASC) 5 MG tablet     buPROPion (WELLBUTRIN XL) 150 MG 24 hr tablet     busPIRone HCl (BUSPAR) 30 MG tablet     calcium polycarbophil (FIBERCON) 625 MG tablet     famotidine (PEPCID) 20 MG tablet     FLUoxetine (PROZAC) 20 MG capsule     furosemide (LASIX) 40 MG tablet     levETIRAcetam (KEPPRA) 1000 MG tablet     levETIRAcetam (KEPPRA) 250 MG tablet     loperamide (IMODIUM) 2 MG capsule     melatonin 3 MG tablet     Nutritional Supplements (ENSURE CLEAR) LIQD     OLANZapine (ZYPREXA) 10 MG tablet     OLANZapine (ZYPREXA) 5 MG tablet     ondansetron (ZOFRAN) 4 MG tablet     pantoprazole (PROTONIX) 40 MG EC tablet     polyethylene glycol (MIRALAX) 17 GM/Dose powder     QUEtiapine (SEROQUEL) 50 MG tablet     rivaroxaban ANTICOAGULANT (XARELTO ANTICOAGULANT) 20 MG TABS tablet     No current facility-administered medications for this visit.     Current Outpatient Medications   Medication Sig Dispense Refill     acetaminophen (TYLENOL) 325 MG tablet Take 650 mg by mouth every 4 hours as needed for mild pain        albuterol (PROAIR HFA/PROVENTIL HFA/VENTOLIN HFA) 108 (90 Base) MCG/ACT inhaler Inhale 2 puffs into the lungs every 4 hours as needed for wheezing 18 g 3     albuterol (PROVENTIL) (2.5 MG/3ML) 0.083% neb solution Take 1 vial (2.5 mg) by nebulization every 4 hours as needed for wheezing or shortness of  breath / dyspnea       amLODIPine (NORVASC) 5 MG tablet Take 1 tablet (5 mg) by mouth daily       buPROPion (WELLBUTRIN XL) 150 MG 24 hr tablet Take 450 mg by mouth       busPIRone HCl (BUSPAR) 30 MG tablet Take 30 mg by mouth 2 times daily       calcium polycarbophil (FIBERCON) 625 MG tablet Take 1 tablet (625 mg) by mouth daily       famotidine (PEPCID) 20 MG tablet Take 20 mg by mouth       FLUoxetine (PROZAC) 20 MG capsule Take 3 capsules (60 mg) by mouth daily 30 capsule 0     furosemide (LASIX) 40 MG tablet Take 1 tablet (40 mg) by mouth daily 30 tablet 0     levETIRAcetam (KEPPRA) 1000 MG tablet TAKE 1 TAB BY MOUTH TWICE A DAY W/250mg=1,250mg       levETIRAcetam (KEPPRA) 250 MG tablet Take 5 tablets (1,250 mg) by mouth 2 times daily 60 tablet 0     loperamide (IMODIUM) 2 MG capsule TAKE 2 CAPSULES (4MG) BY MOUTH AFTER FIRST LOOSE STOOL, THEN;TAKE 1 CAPSULE AFTER CONSECUTIVE STOOLS, MAX 4 CAPS/24H       melatonin 3 MG tablet Take 2 tablets (6 mg) by mouth nightly as needed for sleep 30 tablet 0     Nutritional Supplements (ENSURE CLEAR) LIQD TAKE ONE CAN BY MOUTH TWICE DAILY IN BETWEEN MEALS FOR WEIGHT LOSS       OLANZapine (ZYPREXA) 10 MG tablet TAKE 1/2 TAB (5mg) BY MOUTH AT BEDTIME       OLANZapine (ZYPREXA) 5 MG tablet Take 1 tablet every night at bedtime. Can take and additional 1/2-1 full tablet during the day as needed for anxiety/agitated. 60 tablet 1     ondansetron (ZOFRAN) 4 MG tablet Take 1 tablet (4 mg) by mouth every 8 hours as needed for nausea 30 tablet 3     pantoprazole (PROTONIX) 40 MG EC tablet Take 1 tablet (40 mg) by mouth 2 times daily (before meals) 30 tablet 0     polyethylene glycol (MIRALAX) 17 GM/Dose powder Take 17 g by mouth daily as needed for constipation 510 g 0     QUEtiapine (SEROQUEL) 50 MG tablet TAKE 1 TAB BY MOUTH AT BEDTIME       rivaroxaban ANTICOAGULANT (XARELTO ANTICOAGULANT) 20 MG TABS tablet Take 1 tablet (20 mg) by mouth daily (with dinner) 30 tablet 3     DRUG  ALLERGIES   Allergies   Allergen Reactions     Pilocarpine Headache and Nausea and Vomiting     Chlorhexidine Itching and Rash     Aripiprazole Headache and Other (See Comments)     Insomnia, nightmares     Bacitracin Rash     Bactroban is effective; No difficulties     Erythromycin      intolerant.     Meperidine Hcl      intolerant only   Demerol     Chloraprep One Step Rash       IMMUNIZATIONS   Immunization History   Administered Date(s) Administered     COVID-19,PF,Moderna 03/18/2021, 07/22/2021, 01/18/2022     Flu 65+ Years 09/28/2020     HepB 01/01/1990     Influenza (IIV3) PF 01/01/2001     Influenza Vaccine IM > 6 months Valent IIV4 (Alfuria,Fluzone) 09/28/2020     Influenza, Quad, High Dose, Pf, 65yr+ (Fluzone HD) 09/28/2020     Pneumo Conj 13-V (2010&after) 10/29/2014     Pneumococcal 23 valent 07/22/2020     TDAP Vaccine (Boostrix) 08/22/2016     Tetanus 06/13/2004     Zoster vaccine recombinant adjuvanted (SHINGRIX) 12/24/2019, 10/12/2020     Zoster vaccine, live 12/18/2008       ONCOLOGIC HISTORY  -2/2021 PRESENTATION: Progressive aphasia.   -2/19/2021 MR brain imaging with 3.3 x 2.8 x 2.8 cm enhancing mass in left frontal-parietal region. A second contrast enhancing lesion (0.5 x 1.0 x 1.0 cm) in right occipital lobe which is dural based and largely stable in size since 9/2011; likely representing a meningioma.  -2/23/2021 SURGERY: Gross total resection by Dr. Cummings  PATHOLOGY: Glioblastoma; IDH1-R132H wild-type/ IDH 1 and 2 wildtype, MGMT promoter methylated. Not BRAF mutated.   -3/23/2021 NEURO-ONC: Recommending chemoradiotherapy.   -3/2021 ADMISSION: SOB and chest pain; CT PA negative, but bilateral DVT noted on U/S. Started on Lovenox. Acute hypoxic respiratory failure in the setting of bilateral pneumonia; presumed PJP, concern for transfusion-related acute lung injury and right hemidiaphragm paralysis. Seizure. Right retroperitoneal hematoma. Ileus. Non-severe malnutrition on TPN with concern  for refeeding syndrome. 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 Shock Liver.  -5/4 - 5/27/2021 RADS: 15 fractions.   -5/25/2021 NEURO-ONC/ DEVICE: Discussed the role of Optune; to be considered. Repeat MRI in 4 weeks with plans to start adjuvant temozolomide.   -6/22/2021 NEURO-ONC/ MRB/ CHEMO: Clinically improving. Imaging largely stable. Starting adjuvant temozolomide 150mg/m2 (250mg), cycle 1 (start date 6/24).   -7/20/2021 NEURO-ONC/ CHEMO: Clinically improving. Thrombocytopenia noted with adjuvant temozolomide dosing, platelets of 26K; will transition to metronomic dosing 50mg/m2 (100mg) daily to start once platelets are > 80K.    -8/17/2021 NEURO-ONC/ MRB/ CHEMO: Clinically improving. Saw Dr. Gamboa, who recommended starting anticoagulation; on Eliquis. Imaging with positive treatment response. Continue metronomic/ daily dosing at 50mg/m2 (100mg) through 12/2021.    -9/14/2021 NEURO-ONC/ CHEMO: Clinically improving. Continue metronomic/ daily dosing at 50mg/m2 (100mg) through 12/2021.    -10/12/2021 NEURO-ONC/ CHEMO: Clinically improving. Holding temozolomide and Zofran in the setting of severe constipation. Abdominal x-ray with high stool burden; consulted Dr. Hodge for management of constipation given history of ileus.   -10/28/2021 CHEMO: Temozolomide restarted.   -11/16/2021 NEURO-ONC/ MRB/ CHEMO: Clinically improving Less constipation, continued low appetite; referral to palliative care for mental health management. Referral to Dr. Agudelo to optimize rehab. Imaging with continued positive treatment response. Continue daily temozolomide.   -12/21/2021 NEURO-ONC/ CHEMO: Clinically improving in terms of appetite, energy. Stopping daily temozolomide at the end of the month. Discussion with Dr. Gamboa regarding removal of IVC filter.   -2/14/2022 MRB with a stable postoperative changes of left parietal craniotomy and tumor resection.   -3/1/2022 NEURO-ONC: Clinically deconditioned, mood is depressed; following with  "Cancer PM&R and Palliative Care. With imaging stable, continue imaging surveillance.     SOCIAL HISTORY   History   Smoking Status     Never Smoker   Smokeless Tobacco     Never Used    Social History    Substance and Sexual Activity      Alcohol use: Yes        Comment: very rare     History   Drug Use No       PHYSICAL EXAMINATION  /81 (BP Location: Left arm, Patient Position: Sitting, Cuff Size: Adult Regular)   Pulse 86   Temp 98  F (36.7  C) (Oral)   Resp 16   SpO2 96%   Wt Readings from Last 2 Encounters:   02/24/22 67.1 kg (148 lb)   02/14/22 65.8 kg (145 lb)      Ht Readings from Last 2 Encounters:   12/03/21 1.651 m (5' 5\")   09/10/21 1.6 m (5' 3\")     KPS: 60    -Lower energy today.  -Respiratory: No increased work of breathing.  -Skin: No facial rashes.   -Legs: Mild swelling, wearing compression stockings.   -Psychiatric: Lower mood and affect. Pleasant, talkative.  -Neurologic:   MENTAL STATUS:     Alert, oriented.     Speech fluent.   Comprehension with some confusion.      CRANIAL NERVES:     Pupils are equal, round.     Extraocular movements full, denies diplopia.     Visual fields full.     Facial sensation intact to light touch.   No facial droop.   Hearing slightly decreased bilaterally.   Normal phonation.   MOTOR: No pronation or drift.        HF 5/5 bilaterally.    SENSATION: Intact to light touch throughout.   Tactile neglect noted.   COORDINATION: Finger-nose with eyes closed off on the right.  GAIT: Sitting in a wheelchair, did not assess today.        MEDICAL RECORDS  Obtained and personally reviewed all available outside medical records in addition to reviewing any records available in our electronic system.     LABS  Personally reviewed all available lab results; CBC and CMP.     IMAGING  Personally reviewed MR brain imaging from last month and compared to prior imaging. To my eye, there is stable postoperative changes of left parietal craniotomy with no radiographic evidence of " cancer recurrence.    Unchanged dural-based mass along the right occipital lobe, presumably meningioma.     Imaging was shown to and results were reviewed with Olga and La Nena.     Imaging and case reviewed with reading neuro-radiologist and Dr. Hardin.        IMPRESSION  Clinic time for this high complexity encounter was spent discussing in detail the nature of her cancer, providing emotional support, answering questions pertaining to my recommendations, and devising the plan as outlined below.     Clinically, Olga's functional status has decompensated in the setting of physical deconditioning due to sitting down a majority of the day, poorly controlled depression and lack of inspiration/ motivation, chronic fatigue, and right maria teresa-neglect plus proprioceptive impairment. There is excellent potential that her functional status can improve if underlying contributing factors are addressed. As a result, continuing to follow with Dr. Plaza in palliative care to address mood/ fatigue and Dr. Agudelo in Cancer Rehab to address instability of her gait/ potential involvement of rehab therapies is critical.     Additionally, Olga was recently evaluated by Dr. 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  It measures about 3 mm in size. Dr. Hardin quoted the risk of rupture at ~1% per year and the risk of intervention would be about 5%. Therefore, the recommendation is to consider repeat MRA imaging in 2/2024.    Labs from 2/14 reviewed with no concerns.     Recent imaging without any concern and as a result, will continue off cancer-directed therapy on imaging surveillance.     PROBLEM LIST  Glioblastoma  Aphasia  Apraxia    PLAN  -CANCER DIRECTED THERAPY-  -Repeat imaging in 2 months.  -Will repeat labs in 2 months.     -STEROIDS-  -Off dexamethasone.    -SEIZURE MANAGEMENT-  -Given history of seizures, antiepileptics are indicated.   -Continue Keppra.     -DVT-  -Following with  Dr. Gamboa; currently on Eliquis.   -Requires lifelong anticoagulation given cancer diagnosis.   -Will need to consider removal of the IVC filter.     -Quality of life/ MOOD/ FATIGUE-  -Depressed mood, PTSD, poor motivation, poor appetite.   -On Zyprexa for insomnia and appetite stimulation.   -Following with palliative care to assist with mental health; on Prozac and Buspar.  -Cannot tolerate CPAP for MARCELLE.    -REHAB NEEDS-  -Potentially starting PT/ OT.   -Following with Dr. Agudelo in cancer rehab.     Return to clinic in 2 months + labs + imaging.     In the meantime, Olga and La Nena know to call with questions or concerns or to report new complaints and can be seen sooner if needed.     Stephanie Baker MD  Neuro-oncology

## 2022-03-01 ENCOUNTER — ONCOLOGY VISIT (OUTPATIENT)
Dept: ONCOLOGY | Facility: CLINIC | Age: 77
End: 2022-03-01
Attending: STUDENT IN AN ORGANIZED HEALTH CARE EDUCATION/TRAINING PROGRAM
Payer: MEDICARE

## 2022-03-01 ENCOUNTER — ONCOLOGY VISIT (OUTPATIENT)
Dept: ONCOLOGY | Facility: CLINIC | Age: 77
End: 2022-03-01
Attending: PSYCHIATRY & NEUROLOGY
Payer: MEDICARE

## 2022-03-01 VITALS
SYSTOLIC BLOOD PRESSURE: 123 MMHG | DIASTOLIC BLOOD PRESSURE: 81 MMHG | HEART RATE: 86 BPM | RESPIRATION RATE: 16 BRPM | OXYGEN SATURATION: 96 % | TEMPERATURE: 98 F

## 2022-03-01 VITALS
RESPIRATION RATE: 16 BRPM | DIASTOLIC BLOOD PRESSURE: 81 MMHG | TEMPERATURE: 98 F | OXYGEN SATURATION: 96 % | SYSTOLIC BLOOD PRESSURE: 123 MMHG | HEART RATE: 86 BPM

## 2022-03-01 DIAGNOSIS — C71.9 GLIOBLASTOMA (H): Primary | ICD-10-CM

## 2022-03-01 DIAGNOSIS — R53.81 PHYSICAL DECONDITIONING: ICD-10-CM

## 2022-03-01 DIAGNOSIS — R26.81 GAIT INSTABILITY: ICD-10-CM

## 2022-03-01 PROCEDURE — 99215 OFFICE O/P EST HI 40 MIN: CPT | Performed by: STUDENT IN AN ORGANIZED HEALTH CARE EDUCATION/TRAINING PROGRAM

## 2022-03-01 PROCEDURE — G0463 HOSPITAL OUTPT CLINIC VISIT: HCPCS | Mod: 27

## 2022-03-01 PROCEDURE — 99214 OFFICE O/P EST MOD 30 MIN: CPT | Performed by: PSYCHIATRY & NEUROLOGY

## 2022-03-01 PROCEDURE — G0463 HOSPITAL OUTPT CLINIC VISIT: HCPCS

## 2022-03-01 ASSESSMENT — PAIN SCALES - GENERAL
PAINLEVEL: NO PAIN (0)
PAINLEVEL: NO PAIN (0)

## 2022-03-01 NOTE — LETTER
3/1/2022         RE: Olga Bailey  [address] [place] [address]        Dear Colleague,    Thank you for referring your patient, Olga Bailey, to the [facility]. Please see a copy of my visit note below.    [facility]   PM&R clinic note        Interval history:     Olga Bailey presents to clinic today for follow up reg her rehab needs.   She has h/o left frontoparietal glioblastoma (IDH wild-type, MGMT promoter methylated).  Was last seen in clinic on 11/30/21.  Recommendations included:  1. Work-up: None  2. Therapy/equipment/braces:  1. Outpatient physical therapy for leg strengthening and aerobic exercise conditioning.  2. Cognitive therapy for memory and concentration  3. Medications:  1. Dulcolax suppository PRN  4. Interventions: No additional  5. Referral / follow up with other providers: See therapies above.  6. Follow up: 3-4 months     Oncologic History:    2/2021 PRESENTATION: Progressive aphasia.     2/19/2021 MR brain imaging with 3.3 x 2.8 x 2.8 cm enhancing mass in left frontal-parietal region. A second contrast enhancing lesion (0.5 x 1.0 x 1.0 cm) in right occipital lobe which is dural based and largely stable in size since 9/2011; likely representing a meningioma.    2/23/2021 SURGERY: Gross total resection by Dr. Cummings    PATHOLOGY: Glioblastoma; IDH1-R132H wild-type/ IDH 1 and 2 wildtype, MGMT promoter methylated. Not BRAF mutated.     3/23/2021 NEURO-ONC: Recommending chemoradiotherapy.     3/2021 ADMISSION: SOB and chest pain; CT PA negative, but bilateral DVT noted on U/S. Started on Lovenox. Acute hypoxic respiratory failure in the setting of bilateral pneumonia; presumed PJP, concern for transfusion-related acute lung injury and right hemidiaphragm paralysis. Seizure. Right retroperitoneal hematoma. Ileus. Non-severe malnutrition on TPN with concern for refeeding syndrome. Mild hyponatremia likely 2/2  SAURAV. Shock Liver.    5/4 - 5/27/2021 RADS: 15 fractions.     5/25/2021 NEURO-ONC/ DEVICE: Discussed the role of Optune; to be considered. Repeat MRI in 4 weeks with plans to start adjuvant temozolomide.     6/22/2021 NEURO-ONC/ MRB/ CHEMO: Clinically improving. Imaging largely stable. Starting adjuvant temozolomide 150mg/m2 (250mg), cycle 1 (start date 6/24).     7/20/2021 NEURO-ONC/ CHEMO: Clinically improving. Thrombocytopenia noted with adjuvant temozolomide dosing, platelets of 26K; will transition to metronomic dosing 50mg/m2 (100mg) daily to start once platelets are > 80K.      8/17/2021 NEURO-ONC/ MRB/ CHEMO: Clinically improving. Saw Dr. Gamboa, who recommended starting anticoagulation; on Eliquis. Imaging with positive treatment response. Continue metronomic/ daily dosing at 50mg/m2 (100mg) through 12/2021.      9/14/2021 NEURO-ONC/ CHEMO: Clinically improving. Continue metronomic/ daily dosing at 50mg/m2 (100mg) through 12/2021.      10/12/2021 NEURO-ONC/ CHEMO: Clinically improving. Holding temozolomide and Zofran in the setting of severe constipation. Abdominal x-ray with high stool burden; consulted Dr. Hodge for management of constipation given history of ileus.     10/28/2021 CHEMO: Temozolomide restarted.     11/16/2021 NEURO-ONC/ MRB/ CHEMO: Clinically improving Less constipation, continued low appetite; referral to palliative care for mental health management. Referral to Dr. Agudelo to optimize rehab. Imaging with continued positive treatment response. Continue daily temozolomide.     3/1/2022 follow-up with Dr. Gay-patient doing overall okay.  Sleep and energy are better.  Following with palliative care and has Prozac and BuSpar, and mood is better.  Home therapies had stopped about 4 weeks ago, and interested in restarting.  She has had recent fall due to instability in her gait when she presented to the ER and imaging did not reveal any head injury or other injuries that were sustained.  Also  presented to the ER as was leaning to the right, and there was concern for stroke however stroke work-up was negative.      Symptoms,  Patient presents for a return visit with her daughter La Nena today.  Patient has had 2 recent episodes where she had to present to the ER.  She had a fall last week and presented to the ER, hit her head with the fall.  Imaging did not reveal any injuries.  In addition, patient presented to the ER again when she was at family's house and was noting to be slumped to the right.  There was a concern for stroke, stroke work-up was negative.  Patient and her daughter states that home PT and OT was stopped as of 4 weeks ago, as they felt that she had plateaued with her progress.  Since then, patient has had a decline in her mobility with decreased balance, increased weakness and increased right-sided neglect and incoordination.  This has severely affected her mobility and ADLs.      Therapies/HEP,  Previously participating in home PT and OT, but was discharged 4 weeks ago.      Functionally,   Patient has had a significant decline in her mobility, with increased balance difficulties, generalized weakness, right-sided neglect and incoordination all affecting her mobility and ADLs over the last 4 weeks.  This has resulted in 2 ER visits.      Social history is unchanged.      Medications:  Current Outpatient Medications   Medication Sig Dispense Refill     acetaminophen (TYLENOL) 325 MG tablet Take 650 mg by mouth every 4 hours as needed for mild pain        albuterol (PROAIR HFA/PROVENTIL HFA/VENTOLIN HFA) 108 (90 Base) MCG/ACT inhaler Inhale 2 puffs into the lungs every 4 hours as needed for wheezing 18 g 3     albuterol (PROVENTIL) (2.5 MG/3ML) 0.083% neb solution Take 1 vial (2.5 mg) by nebulization every 4 hours as needed for wheezing or shortness of breath / dyspnea       amLODIPine (NORVASC) 5 MG tablet Take 1 tablet (5 mg) by mouth daily       buPROPion (WELLBUTRIN XL) 150 MG 24 hr  tablet Take 450 mg by mouth       busPIRone HCl (BUSPAR) 30 MG tablet Take 30 mg by mouth 2 times daily       calcium polycarbophil (FIBERCON) 625 MG tablet Take 1 tablet (625 mg) by mouth daily       famotidine (PEPCID) 20 MG tablet Take 20 mg by mouth       FLUoxetine (PROZAC) 20 MG capsule Take 3 capsules (60 mg) by mouth daily 30 capsule 0     furosemide (LASIX) 40 MG tablet Take 1 tablet (40 mg) by mouth daily 30 tablet 0     levETIRAcetam (KEPPRA) 1000 MG tablet TAKE 1 TAB BY MOUTH TWICE A DAY W/250mg=1,250mg       levETIRAcetam (KEPPRA) 250 MG tablet Take 5 tablets (1,250 mg) by mouth 2 times daily 60 tablet 0     loperamide (IMODIUM) 2 MG capsule TAKE 2 CAPSULES (4MG) BY MOUTH AFTER FIRST LOOSE STOOL, THEN;TAKE 1 CAPSULE AFTER CONSECUTIVE STOOLS, MAX 4 CAPS/24H       melatonin 3 MG tablet Take 2 tablets (6 mg) by mouth nightly as needed for sleep 30 tablet 0     Nutritional Supplements (ENSURE CLEAR) LIQD TAKE ONE CAN BY MOUTH TWICE DAILY IN BETWEEN MEALS FOR WEIGHT LOSS       OLANZapine (ZYPREXA) 10 MG tablet TAKE 1/2 TAB (5mg) BY MOUTH AT BEDTIME       OLANZapine (ZYPREXA) 5 MG tablet Take 1 tablet every night at bedtime. Can take and additional 1/2-1 full tablet during the day as needed for anxiety/agitated. 60 tablet 1     ondansetron (ZOFRAN) 4 MG tablet Take 1 tablet (4 mg) by mouth every 8 hours as needed for nausea 30 tablet 3     pantoprazole (PROTONIX) 40 MG EC tablet Take 1 tablet (40 mg) by mouth 2 times daily (before meals) 30 tablet 0     polyethylene glycol (MIRALAX) 17 GM/Dose powder Take 17 g by mouth daily as needed for constipation 510 g 0     QUEtiapine (SEROQUEL) 50 MG tablet TAKE 1 TAB BY MOUTH AT BEDTIME       rivaroxaban ANTICOAGULANT (XARELTO ANTICOAGULANT) 20 MG TABS tablet Take 1 tablet (20 mg) by mouth daily (with dinner) 30 tablet 3              Physical Exam:   /81   Pulse 86   Temp 98  F (36.7  C) (Oral)   Resp 16   SpO2 96%   Gen: NAD, pleasant and cooperative    HEENT: Normocephalic  Pulm: non-labored breathing in room air.  No cough.  Ext: WWP, no edema in BLE, no tenderness in calves.  Neuro/MSK:   Appearance: Left shoulder is elevated higher than right shoulder  ROM: AROM right shoulder abduction 110 degrees, PROM 170 degrees  Strength: Right shoulder abduction 3/5, right elbow flexion 4/5, right  strength 4/5, right hip flexion 4 -/5. All other manual muscle testing 4+/5.  Sensation: Symmetric to light touch bilaterally.  Coordination: Left finger-to-nose impaired, right finger-to-nose intact.      Labs/Imaging:  Lab Results   Component Value Date    WBC 5.4 02/14/2022    HGB 10.6 (L) 02/14/2022    HCT 33.5 (L) 02/14/2022     (H) 02/14/2022     02/14/2022     Lab Results   Component Value Date     02/14/2022    POTASSIUM 4.1 02/14/2022    CHLORIDE 107 02/14/2022    CO2 30 02/14/2022    GLC 95 02/14/2022     Lab Results   Component Value Date    GFRESTIMATED 38 (L) 02/14/2022    GFRESTBLACK >90 07/09/2021     Lab Results   Component Value Date    AST 12 02/03/2022    ALT <9 02/03/2022    ALKPHOS 90 02/03/2022    BILITOTAL 0.4 02/03/2022     Lab Results   Component Value Date    INR 1.44 (H) 02/14/2022     Lab Results   Component Value Date    BUN 31 (H) 02/14/2022    CR 1.44 (H) 02/14/2022              Assessment/Plan   Olga Bailey presents to clinic today for follow up reg her rehab needs. She has h/o left frontoparietal wound glioblastoma (IDH wild-type, MGMT promoter methylated).  Was last seen in clinic on 11/30/21.  As discussed with Olga and La Nena, she has had a significant decline in her functional mobility over the last several weeks since she last stopped home therapies.  She has increased generalized weakness, more right upper and lower extremity weakness, increased right-sided neglect and impaired coordination that are all affecting her mobility and ADLs.  Strongly recommend reinitiating home PT and OT to help with ongoing  "targeted strengthening, balance, gait and activities of daily living including fine motor activities.  We will have these orders placed and faxed to the appropriate home care agency.  We will plan for a return visit in 2 months when patient sees Dr. Baker in clinic.  Patient and her daughter are in agreement with this plan.      1. Therapy/equipment/braces:  1. Restart home PT and OT for worsened functional mobility, right-sided neglect and impaired coordination severely affecting mobility and ADLs.  2. Follow up: 2 months.      Yesenia Agudelo MD  Physical Medicine & Rehabilitation      50 minutes spent on the date of the encounter doing chart review, history and exam, documentation and further activities as noted above.            Oncology Rooming Note    March 1, 2022 3:29 PM   Olga Bailey is a 76 year old female who presents for:    Chief Complaint   Patient presents with     Oncology Clinic Visit     GBM (glioblastoma multiforme) (H)     Initial Vitals: /81   Pulse 86   Temp 98  F (36.7  C) (Oral)   Resp 16   SpO2 96%  Estimated body mass index is 24.63 kg/m  as calculated from the following:    Height as of 12/3/21: 1.651 m (5' 5\").    Weight as of 2/24/22: 67.1 kg (148 lb). There is no height or weight on file to calculate BSA.  No Pain (0) Comment: Data Unavailable   No LMP recorded. Patient has had a hysterectomy.  Allergies reviewed: Yes  Medications reviewed: Yes    Medications: Medication refills not needed today.  Pharmacy name entered into [employer]:    [facility] - [place], [place] - [address]   [facility] [id number] - [place], [place] - [address]    Clinical concerns: no       Paula Turner, [facility]                  Again, thank you for allowing me to participate in the care of your patient.        Sincerely,        Yesenia Agudelo MD    "

## 2022-03-01 NOTE — PROGRESS NOTES
"Oncology Rooming Note    March 1, 2022 2:51 PM   Olga Bailey is a 76 year old female who presents for:    Chief Complaint   Patient presents with     Oncology Clinic Visit     GBM (glioblastoma multiforme) (H)     Initial Vitals: /81 (BP Location: Left arm, Patient Position: Sitting, Cuff Size: Adult Regular)   Pulse 86   Temp 98  F (36.7  C) (Oral)   Resp 16   SpO2 96%  Estimated body mass index is 24.63 kg/m  as calculated from the following:    Height as of 12/3/21: 1.651 m (5' 5\").    Weight as of 2/24/22: 67.1 kg (148 lb). There is no height or weight on file to calculate BSA.  No Pain (0) Comment: Data Unavailable   No LMP recorded. Patient has had a hysterectomy.  Allergies reviewed: Yes  Medications reviewed: Yes    Medications: Medication refills not needed today.  Pharmacy name entered into [employer]:    [facility] - [place], [place] - [address]  [facility] [id number] - [place], [place] - [address]    Clinical concerns: no      Paula Turner CMA              "

## 2022-03-01 NOTE — PROGRESS NOTES
"Oncology Rooming Note    March 1, 2022 3:29 PM   Olga Bailey is a 76 year old female who presents for:    Chief Complaint   Patient presents with     Oncology Clinic Visit     GBM (glioblastoma multiforme) (H)     Initial Vitals: /81   Pulse 86   Temp 98  F (36.7  C) (Oral)   Resp 16   SpO2 96%  Estimated body mass index is 24.63 kg/m  as calculated from the following:    Height as of 12/3/21: 1.651 m (5' 5\").    Weight as of 2/24/22: 67.1 kg (148 lb). There is no height or weight on file to calculate BSA.  No Pain (0) Comment: Data Unavailable   No LMP recorded. Patient has had a hysterectomy.  Allergies reviewed: Yes  Medications reviewed: Yes    Medications: Medication refills not needed today.  Pharmacy name entered into [employer]:    [facility] - [place], [place] - [address]  [id number] - [place], [place] - [address]    Clinical concerns: no       Paula Turner CMA              "

## 2022-03-01 NOTE — LETTER
"    3/1/2022         RE: Olga Bailey  [address] [place] [address]        Dear Colleague,    Thank you for referring your patient, Olga Bailey, to the [facility]. Please see a copy of my visit note below.    NEURO-ONCOLOGY VISIT  Mar 1, 2022    CHIEF COMPLAINT: Ms. Olga Bailey is a 76 year old right-handed woman with a left frontoparietal glioblastoma (IDH wild-type, MGMT promoter methylated), diagnosed following gross total resection on 2/23/2021. Initiation of upfront cancer-directed treatment was delayed due to a complicated hospitalization. Given a resolving infection and lowered functional status, radiation alone was initiated on 5/4/2021 and completed on 5/27/2021.     Olga started adjuvant therapy with 150mg/m2 dosing of temozolomide, however, cycle 1 was complicated by significant hematologic toxicity. Dosing was changed to metronomic/ daily dosing in 7/2021 and this was well tolerated. Imaging from 8/2021 and 11/2021 demonstrates positive treatment effect.    Chemotherapy was placed on a hold in 10/2021 in the setting of severe constipation, but then, resumed. She has since completed the planned 6 months of adjuvant temozolomide as of 12/2021.    Imaging in 2/2022 was without concern. The plan is to continue on imaging surveillance.     Olga is presenting in follow-up accompanied by La Nena (daughter).    HISTORY OF PRESENT ILLNESS  -Olga is fairly well today.  -Sleep is good. Energy is fair during the day; still fatigued with poor stamina.   -No issues with constipation. Appetite is good. No issues with nausea.  -Mood is \"average\", still lacks motivation. Following with palliative care.   -Memory is impaired, difficulty with finding words, confusion at times/ sundowning, and difficulty with concentration.  -She can walk with a walker. Gait is shuffling, not steady. Spending much of the day sitting. Had a recent fall last week; went to ED and " CT head was without bleed, x-ray of pelvis was without fracture. Following with Dr. Agudelo; discharged therapies (PT/ OT), but hoping to restart.  -No headaches recently.  -Denies changes in sensation.  -Off dexamethasone.   -No recurrent seizures since her last visit, tolerating Keppra.  -On anticoagulation with no significant signs/ symptoms of bleeding. Had a bloody nose last week. Less swelling in the legs; improved with compression stocking use.   -Evaluated by Dr. 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    REVIEW OF SYSTEMS  A comprehensive ROS negative except as in HPI.    MEDICATIONS   Current Outpatient Medications   Medication     acetaminophen (TYLENOL) 325 MG tablet     albuterol (PROAIR HFA/PROVENTIL HFA/VENTOLIN HFA) 108 (90 Base) MCG/ACT inhaler     albuterol (PROVENTIL) (2.5 MG/3ML) 0.083% neb solution     amLODIPine (NORVASC) 5 MG tablet     buPROPion (WELLBUTRIN XL) 150 MG 24 hr tablet     busPIRone HCl (BUSPAR) 30 MG tablet     calcium polycarbophil (FIBERCON) 625 MG tablet     famotidine (PEPCID) 20 MG tablet     FLUoxetine (PROZAC) 20 MG capsule     furosemide (LASIX) 40 MG tablet     levETIRAcetam (KEPPRA) 1000 MG tablet     levETIRAcetam (KEPPRA) 250 MG tablet     loperamide (IMODIUM) 2 MG capsule     melatonin 3 MG tablet     Nutritional Supplements (ENSURE CLEAR) LIQD     OLANZapine (ZYPREXA) 10 MG tablet     OLANZapine (ZYPREXA) 5 MG tablet     ondansetron (ZOFRAN) 4 MG tablet     pantoprazole (PROTONIX) 40 MG EC tablet     polyethylene glycol (MIRALAX) 17 GM/Dose powder     QUEtiapine (SEROQUEL) 50 MG tablet     rivaroxaban ANTICOAGULANT (XARELTO ANTICOAGULANT) 20 MG TABS tablet     No current facility-administered medications for this visit.     Current Outpatient Medications   Medication Sig Dispense Refill     acetaminophen (TYLENOL) 325 MG tablet Take 650 mg by mouth every 4 hours as needed for mild pain        albuterol (PROAIR HFA/PROVENTIL HFA/VENTOLIN  HFA) 108 (90 Base) MCG/ACT inhaler Inhale 2 puffs into the lungs every 4 hours as needed for wheezing 18 g 3     albuterol (PROVENTIL) (2.5 MG/3ML) 0.083% neb solution Take 1 vial (2.5 mg) by nebulization every 4 hours as needed for wheezing or shortness of breath / dyspnea       amLODIPine (NORVASC) 5 MG tablet Take 1 tablet (5 mg) by mouth daily       buPROPion (WELLBUTRIN XL) 150 MG 24 hr tablet Take 450 mg by mouth       busPIRone HCl (BUSPAR) 30 MG tablet Take 30 mg by mouth 2 times daily       calcium polycarbophil (FIBERCON) 625 MG tablet Take 1 tablet (625 mg) by mouth daily       famotidine (PEPCID) 20 MG tablet Take 20 mg by mouth       FLUoxetine (PROZAC) 20 MG capsule Take 3 capsules (60 mg) by mouth daily 30 capsule 0     furosemide (LASIX) 40 MG tablet Take 1 tablet (40 mg) by mouth daily 30 tablet 0     levETIRAcetam (KEPPRA) 1000 MG tablet TAKE 1 TAB BY MOUTH TWICE A DAY W/250mg=1,250mg       levETIRAcetam (KEPPRA) 250 MG tablet Take 5 tablets (1,250 mg) by mouth 2 times daily 60 tablet 0     loperamide (IMODIUM) 2 MG capsule TAKE 2 CAPSULES (4MG) BY MOUTH AFTER FIRST LOOSE STOOL, THEN;TAKE 1 CAPSULE AFTER CONSECUTIVE STOOLS, MAX 4 CAPS/24H       melatonin 3 MG tablet Take 2 tablets (6 mg) by mouth nightly as needed for sleep 30 tablet 0     Nutritional Supplements (ENSURE CLEAR) LIQD TAKE ONE CAN BY MOUTH TWICE DAILY IN BETWEEN MEALS FOR WEIGHT LOSS       OLANZapine (ZYPREXA) 10 MG tablet TAKE 1/2 TAB (5mg) BY MOUTH AT BEDTIME       OLANZapine (ZYPREXA) 5 MG tablet Take 1 tablet every night at bedtime. Can take and additional 1/2-1 full tablet during the day as needed for anxiety/agitated. 60 tablet 1     ondansetron (ZOFRAN) 4 MG tablet Take 1 tablet (4 mg) by mouth every 8 hours as needed for nausea 30 tablet 3     pantoprazole (PROTONIX) 40 MG EC tablet Take 1 tablet (40 mg) by mouth 2 times daily (before meals) 30 tablet 0     polyethylene glycol (MIRALAX) 17 GM/Dose powder Take 17 g by mouth  daily as needed for constipation 510 g 0     QUEtiapine (SEROQUEL) 50 MG tablet TAKE 1 TAB BY MOUTH AT BEDTIME       rivaroxaban ANTICOAGULANT (XARELTO ANTICOAGULANT) 20 MG TABS tablet Take 1 tablet (20 mg) by mouth daily (with dinner) 30 tablet 3     DRUG ALLERGIES   Allergies   Allergen Reactions     Pilocarpine Headache and Nausea and Vomiting     Chlorhexidine Itching and Rash     Aripiprazole Headache and Other (See Comments)     Insomnia, nightmares     Bacitracin Rash     Bactroban is effective; No difficulties     Erythromycin      intolerant.     Meperidine Hcl      intolerant only   Demerol     Chloraprep One Step Rash       IMMUNIZATIONS   Immunization History   Administered Date(s) Administered     COVID-19,PF,Moderna 03/18/2021, 07/22/2021, 01/18/2022     Flu 65+ Years 09/28/2020     HepB 01/01/1990     Influenza (IIV3) PF 01/01/2001     Influenza Vaccine IM > 6 months Valent IIV4 (Alfuria,Fluzone) 09/28/2020     Influenza, Quad, High Dose, Pf, 65yr+ (Fluzone HD) 09/28/2020     Pneumo Conj 13-V (2010&after) 10/29/2014     Pneumococcal 23 valent 07/22/2020     TDAP Vaccine (Boostrix) 08/22/2016     Tetanus 06/13/2004     Zoster vaccine recombinant adjuvanted (SHINGRIX) 12/24/2019, 10/12/2020     Zoster vaccine, live 12/18/2008       ONCOLOGIC HISTORY  -2/2021 PRESENTATION: Progressive aphasia.   -2/19/2021 MR brain imaging with 3.3 x 2.8 x 2.8 cm enhancing mass in left frontal-parietal region. A second contrast enhancing lesion (0.5 x 1.0 x 1.0 cm) in right occipital lobe which is dural based and largely stable in size since 9/2011; likely representing a meningioma.  -2/23/2021 SURGERY: Gross total resection by Dr. Cummings  PATHOLOGY: Glioblastoma; IDH1-R132H wild-type/ IDH 1 and 2 wildtype, MGMT promoter methylated. Not BRAF mutated.   -3/23/2021 NEURO-ONC: Recommending chemoradiotherapy.   -3/2021 ADMISSION: SOB and chest pain; CT PA negative, but bilateral DVT noted on U/S. Started on Lovenox. Acute  hypoxic respiratory failure in the setting of bilateral pneumonia; presumed PJP, concern for transfusion-related acute lung injury and right hemidiaphragm paralysis. Seizure. Right retroperitoneal hematoma. Ileus. Non-severe malnutrition on TPN with concern for refeeding syndrome. 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 Shock Liver.  -5/4 - 5/27/2021 RADS: 15 fractions.   -5/25/2021 NEURO-ONC/ DEVICE: Discussed the role of Optune; to be considered. Repeat MRI in 4 weeks with plans to start adjuvant temozolomide.   -6/22/2021 NEURO-ONC/ MRB/ CHEMO: Clinically improving. Imaging largely stable. Starting adjuvant temozolomide 150mg/m2 (250mg), cycle 1 (start date 6/24).   -7/20/2021 NEURO-ONC/ CHEMO: Clinically improving. Thrombocytopenia noted with adjuvant temozolomide dosing, platelets of 26K; will transition to metronomic dosing 50mg/m2 (100mg) daily to start once platelets are > 80K.    -8/17/2021 NEURO-ONC/ MRB/ CHEMO: Clinically improving. Saw Dr. Gamboa, who recommended starting anticoagulation; on Eliquis. Imaging with positive treatment response. Continue metronomic/ daily dosing at 50mg/m2 (100mg) through 12/2021.    -9/14/2021 NEURO-ONC/ CHEMO: Clinically improving. Continue metronomic/ daily dosing at 50mg/m2 (100mg) through 12/2021.    -10/12/2021 NEURO-ONC/ CHEMO: Clinically improving. Holding temozolomide and Zofran in the setting of severe constipation. Abdominal x-ray with high stool burden; consulted Dr. Hodge for management of constipation given history of ileus.   -10/28/2021 CHEMO: Temozolomide restarted.   -11/16/2021 NEURO-ONC/ MRB/ CHEMO: Clinically improving Less constipation, continued low appetite; referral to palliative care for mental health management. Referral to Dr. Agudelo to optimize rehab. Imaging with continued positive treatment response. Continue daily temozolomide.   -12/21/2021 NEURO-ONC/ CHEMO: Clinically improving in terms of appetite, energy. Stopping daily temozolomide at the end  "of the month. Discussion with Dr. Gamboa regarding removal of IVC filter.   -2/14/2022 MRB with a stable postoperative changes of left parietal craniotomy and tumor resection.   -3/1/2022 NEURO-ONC: Clinically deconditioned, mood is depressed; following with Cancer PM&R and Palliative Care. With imaging stable, continue imaging surveillance.     SOCIAL HISTORY   History   Smoking Status     Never Smoker   Smokeless Tobacco     Never Used    Social History    Substance and Sexual Activity      Alcohol use: Yes        Comment: very rare     History   Drug Use No       PHYSICAL EXAMINATION  /81 (BP Location: Left arm, Patient Position: Sitting, Cuff Size: Adult Regular)   Pulse 86   Temp 98  F (36.7  C) (Oral)   Resp 16   SpO2 96%   Wt Readings from Last 2 Encounters:   02/24/22 67.1 kg (148 lb)   02/14/22 65.8 kg (145 lb)      Ht Readings from Last 2 Encounters:   12/03/21 1.651 m (5' 5\")   09/10/21 1.6 m (5' 3\")     KPS: 60    -Lower energy today.  -Respiratory: No increased work of breathing.  -Skin: No facial rashes.   -Legs: Mild swelling, wearing compression stockings.   -Psychiatric: Lower mood and affect. Pleasant, talkative.  -Neurologic:   MENTAL STATUS:     Alert, oriented.     Speech fluent.   Comprehension with some confusion.      CRANIAL NERVES:     Pupils are equal, round.     Extraocular movements full, denies diplopia.     Visual fields full.     Facial sensation intact to light touch.   No facial droop.   Hearing slightly decreased bilaterally.   Normal phonation.   MOTOR: No pronation or drift.        HF 5/5 bilaterally.    SENSATION: Intact to light touch throughout.   Tactile neglect noted.   COORDINATION: Finger-nose with eyes closed off on the right.  GAIT: Sitting in a wheelchair, did not assess today.        MEDICAL RECORDS  Obtained and personally reviewed all available outside medical records in addition to reviewing any records available in our electronic system. "     LABS  Personally reviewed all available lab results; CBC and CMP.     IMAGING  Personally reviewed MR brain imaging from last month and compared to prior imaging. To my eye, there is stable postoperative changes of left parietal craniotomy with no radiographic evidence of cancer recurrence.    Unchanged dural-based mass along the right occipital lobe, presumably meningioma.     Imaging was shown to and results were reviewed with Olga and La Nena.     Imaging and case reviewed with reading neuro-radiologist and Dr. Hardin.        IMPRESSION  Clinic time for this high complexity encounter was spent discussing in detail the nature of her cancer, providing emotional support, answering questions pertaining to my recommendations, and devising the plan as outlined below.     Clinically, Olga's functional status has decompensated in the setting of physical deconditioning due to sitting down a majority of the day, poorly controlled depression and lack of inspiration/ motivation, chronic fatigue, and right maria teresa-neglect plus proprioceptive impairment. There is excellent potential that her functional status can improve if underlying contributing factors are addressed. As a result, continuing to follow with Dr. Plaza in palliative care to address mood/ fatigue and Dr. Agudelo in Cancer Rehab to address instability of her gait/ potential involvement of rehab therapies is critical.     Additionally, Olga was recently evaluated by Dr. 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  It measures about 3 mm in size. Dr. Hardin quoted the risk of rupture at ~1% per year and the risk of intervention would be about 5%. Therefore, the recommendation is to consider repeat MRA imaging in 2/2024.    Labs from 2/14 reviewed with no concerns.     Recent imaging without any concern and as a result, will continue off cancer-directed therapy on imaging surveillance.     PROBLEM  "LIST  Glioblastoma  Aphasia  Apraxia    PLAN  -CANCER DIRECTED THERAPY-  -Repeat imaging in 2 months.  -Will repeat labs in 2 months.     -STEROIDS-  -Off dexamethasone.    -SEIZURE MANAGEMENT-  -Given history of seizures, antiepileptics are indicated.   -Continue Keppra.     -DVT-  -Following with Dr. Gamboa; currently on Eliquis.   -Requires lifelong anticoagulation given cancer diagnosis.   -Will need to consider removal of the IVC filter.     -Quality of life/ MOOD/ FATIGUE-  -Depressed mood, PTSD, poor motivation, poor appetite.   -On Zyprexa for insomnia and appetite stimulation.   -Following with palliative care to assist with mental health; on Prozac and Buspar.  -Cannot tolerate CPAP for MARCELLE.    -REHAB NEEDS-  -Potentially starting PT/ OT.   -Following with Dr. Agudelo in cancer rehab.     Return to clinic in 2 months + labs + imaging.     In the meantime, Olga and La Nena know to call with questions or concerns or to report new complaints and can be seen sooner if needed.     Stephanie Baker MD  Neuro-oncology      Oncology Rooming Note    March 1, 2022 2:51 PM   Olga Bailey is a 76 year old female who presents for:    Chief Complaint   Patient presents with     Oncology Clinic Visit     GBM (glioblastoma multiforme) (H)     Initial Vitals: /81 (BP Location: Left arm, Patient Position: Sitting, Cuff Size: Adult Regular)   Pulse 86   Temp 98  F (36.7  C) (Oral)   Resp 16   SpO2 96%  Estimated body mass index is 24.63 kg/m  as calculated from the following:    Height as of 12/3/21: 1.651 m (5' 5\").    Weight as of 2/24/22: 67.1 kg (148 lb). There is no height or weight on file to calculate BSA.  No Pain (0) Comment: Data Unavailable   No LMP recorded. Patient has had a hysterectomy.  Allergies reviewed: Yes  Medications reviewed: Yes    Medications: Medication refills not needed today.  Pharmacy name entered into [employer]:    [facility] - [place], [place] - [address] [facility] [address] [place], [place] - [address]    Clinical concerns: no      Paula Turner CMA                  Again, thank you for allowing me to participate in the care of your patient.        Sincerely,        Stephanie Baker MD

## 2022-03-02 ENCOUNTER — TELEPHONE (OUTPATIENT)
Dept: ONCOLOGY | Facility: CLINIC | Age: 77
End: 2022-03-02
Payer: MEDICARE

## 2022-03-02 ENCOUNTER — DOCUMENTATION ONLY (OUTPATIENT)
Dept: PHARMACY | Facility: CLINIC | Age: 77
End: 2022-03-02
Payer: MEDICARE

## 2022-03-02 ENCOUNTER — DOCUMENTATION ONLY (OUTPATIENT)
Dept: OTHER | Facility: CLINIC | Age: 77
End: 2022-03-02
Payer: MEDICARE

## 2022-03-02 NOTE — PROGRESS NOTES
ORAL CHEMOTHERAPY DISCONTINUATION       Primary Oncologist:  Dr. Baker  Primary Oncology Clinic: [facility] Diagnosis:  Glioblastoma  Therapy History:  Completed 6 months of metronomic temozolomide.  Reason For Discontinuation: therapy completed    Additional Notes:  Imaging surveillance. Pharmacy can follow again if patient resumes/starts new oral chemo.    Evette Fowler, Pharmacist Intern  March 2, 2022    Brigid Mcintyre PharmD  March 2, 2022

## 2022-03-02 NOTE — PATIENT INSTRUCTIONS
1.  As we discussed, Dr. Agudelo is recommending restarting home physical therapy and Occupational Therapy as soon as possible.  These orders will be placed, and you should expect to be contacted from the home care agency.  2.  Plan a return to clinic visit with Dr. Agudelo in 2 months.

## 2022-03-02 NOTE — TELEPHONE ENCOUNTER
Per Dr. Agudelo patient needs PT/OT. Patient has had [facility] in the past. Order placed for home care. Writer called [facility] left message with Tomas if [facility] could be reinstated. Kathryn Mcbride RN,BSN,OCN,CBCN

## 2022-03-02 NOTE — TELEPHONE ENCOUNTER
Called patient's daughter Angelika she is aware that [facility] can be reinstated with PT/OT. Order wll be faxed to [facility] and Angelika will be contacted. Kathryn Mcbride RN,BSN,OCN,CBCN

## 2022-03-03 NOTE — PROGRESS NOTES
Olga is a 76 year old who is being evaluated via a billable video visit.      How would you like to obtain your AVS? MyChart  If the video visit is dropped, the invitation should be resent by: Send to e-mail at: [email or web address]  Will anyone else be joining your video visit? No     Laurie Kaminski VF        Palliative Care Progress Note    Patient Name: Olga Bailey  Primary Provider: [facility], [facility]    Chief Complaint/Patient ID: 77 yo F with dx of glioblastoma s/p resection 02/2021 with prior depression/anxiety, HTN, asthma, GERD.Treatment course c/b dxs of PJP pneumonia, B/l DVTs, & retroperitoneal bleed in 03/2021 resulting in prolonged hospital stay. Completed radiation May 2021. Started chemo but had toxicity, changed to metronomic dosing of temodar in July. Most recent imaging with positive response to therapy.     -Mood worsened secondary to coping with long stay- has struggled with some symptoms of PTSD.    Last Palliative care appointment: 12/10/21 with me.  Referral to  LICSW for support.  Trial of Zyprexa 5 mg for appetite.  Stop Seroquel.     Reviewed: Yes, no concerns.    Interim History:  Olga Bailey 76 year old female is seen today for follow up with Palliative Care via billable video visit. Her friend Lakesha is present and provides additional history for the visit.     Reviewed oncology note from 3/1.  Recent imaging without any concern, so planning to continue off cancer directed therapy and follow-up with imaging surveillance.    Working with oncology PM&R for fatigue and deconditioning.  New orders for OT and PT have been sent to help focus on patient's deconditioning.    Started the Zyprexa for appetite, sleep, and anxiety.  Does feel there has been clear improvement in her sleep and does think that her appetite has been a little bit better.    Friend Lakesha has questions from patient's daughter.  They note she is quite drowsy during the daytime and feel this is  "affecting her ability to participate in therapy.  She although can to have decreased vision to do things.  They are wondering if she would be an okay candidate to try a stimulant medication.      Patient does express that her mood has continued to be \"up-and-down\" and feels that her lack of progress physically has been a big contributor to this. Established with [facility] for coping support.       Social History:  Born in [place], lived in [place] from age 4yrs to her 20s, then moved to [place]. 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   Social History     Tobacco Use     Smoking status: Never Smoker     Smokeless tobacco: Never Used   Substance Use Topics     Alcohol use: Yes     Comment: very rare     Drug use: No       Family History- Reviewed in Epic.    Allergies   Allergen Reactions     Pilocarpine Headache and Nausea and Vomiting     Chlorhexidine Itching and Rash     Aripiprazole Headache and Other (See Comments)     Insomnia, nightmares     Bacitracin Rash     Bactroban is effective; No difficulties     Erythromycin      intolerant.     Meperidine Hcl      intolerant only   Demerol     Chloraprep One Step Rash       Advanced Care Planning: HCD on file, names daughter as HCA. POLST on file states full code.    Medications- Reviewed in Epic.    Past Medical History- Reviewed in [employer].    Past Surgical History- Reviewed in Epic.    Review of Systems:   ROS: 10 point ROS neg other than the symptoms noted above in the HPI.      Physical Exam:   Constitutional: Alert, pleasant, no apparent distress. Sitting up in chair.  Eyes: Sclera non-icteric, no eye discharge.  ENT: No nasal discharge. Ears grossly normal.  Respiratory: Unlabored respirations. Speaking in full sentences.  Musculoskeletal: Extremities appear normal- no gross deformities noted. No edema noted on upper body.   Skin: No suspicious lesions or rashes on visible skin.  Neurologic: Clear speech, no aphasia. No facial " droop.  Psychiatric: Mentation appears slowed, appropriate attention. Affect flat does not appear anxious or depressed.      Key Data Reviewed:  LABS: 2/14/2022- Cr 1.44, GFR 38. WBC 5.4, Hgb 10.6, Plts 195.     IMAGING: CT head 2/24/2022- Impression:  1. No acute intracranial pathology.   2. Postsurgical changes of left parietal craniotomy with underlying small resection cavity.  3. Mild generalized parenchymal volume loss and moderate leukoaraiosis.   4. Unchanged right occipital subcentimeter calcified presumed meningioma.    Impression & Recommendations & Counseling:  Olga Bailey is a 76 year old female with history of glioblastoma, anxiety, and depression.    Fatigue  Depressed mood  Low motivation - These are likely multifactorial.  Fatigue, mood, and cognitive changes are affecting her ability to participate meaningfully in therapy like activities.  She was recently given new orders for home care from PM&R.  Discussed concern about stimulant medications possibly adversely affecting the appetite, so this is something we would need to keep an eye on.  -Okay to start low-dose Ritalin.  Start with 2.5 mg in the morning and then can increase to 5 mg in the morning after 7 days.  -They will keep an eye on appetite as well as the effect of the medicine on her energy level.  We can increase this in the future as long as no adverse events.  -Agree with repeat referral to PT/OT.    Difficulty sleeping  Lack of appetite - Both seem to be improved with Zyprexa once daily at bedtime.  Had discussed possibly trying half dose during the day, however given ongoing fatigue, trial of stimulant medication first makes more sense.  -Continue Zyprexa 5 mg only at bedtime.      Follow up: About 3 months    Video-Visit Details  Video Start Time:  11:21 AM  Video End Time:  11:40 AM    Originating Location (pt. Location): Home     Distant Location (provider location): [facility]     Platform used for  Video Visit: Mirtha     Total time spent on day of encounter is 27 mins, including reviewing record, review of above studies, above visit with patient, symptomatic discussion of appetite, sleep, motivation/mood, and fatigue, including medication adjustments/prescription management, and documentation.     Maggi Plaza,   Palliative Medicine   Pager [phone], AMCOM ID [id number]    Some chart documentation performed using Dragon Voice recognition Software. Although reviewed after completion, some words and grammatical errors may remain.

## 2022-03-04 ENCOUNTER — VIRTUAL VISIT (OUTPATIENT)
Dept: ONCOLOGY | Facility: CLINIC | Age: 77
End: 2022-03-04
Attending: STUDENT IN AN ORGANIZED HEALTH CARE EDUCATION/TRAINING PROGRAM
Payer: MEDICARE

## 2022-03-04 DIAGNOSIS — R45.89 DEPRESSED MOOD: ICD-10-CM

## 2022-03-04 DIAGNOSIS — Z51.5 ENCOUNTER FOR PALLIATIVE CARE: ICD-10-CM

## 2022-03-04 DIAGNOSIS — R63.0 ANOREXIA: ICD-10-CM

## 2022-03-04 DIAGNOSIS — R53.0 NEOPLASTIC MALIGNANT RELATED FATIGUE: Primary | ICD-10-CM

## 2022-03-04 DIAGNOSIS — G47.9 DIFFICULTY SLEEPING: ICD-10-CM

## 2022-03-04 DIAGNOSIS — R41.89 COGNITIVE CHANGES: ICD-10-CM

## 2022-03-04 DIAGNOSIS — C71.9 GLIOBLASTOMA (H): ICD-10-CM

## 2022-03-04 DIAGNOSIS — R53.81 PHYSICAL DECONDITIONING: ICD-10-CM

## 2022-03-04 PROCEDURE — 99214 OFFICE O/P EST MOD 30 MIN: CPT | Mod: 95 | Performed by: STUDENT IN AN ORGANIZED HEALTH CARE EDUCATION/TRAINING PROGRAM

## 2022-03-04 PROCEDURE — G0463 HOSPITAL OUTPT CLINIC VISIT: HCPCS | Mod: PN,RTG | Performed by: STUDENT IN AN ORGANIZED HEALTH CARE EDUCATION/TRAINING PROGRAM

## 2022-03-04 RX ORDER — METHYLPHENIDATE HYDROCHLORIDE 5 MG/1
TABLET ORAL
Qty: 30 TABLET | Refills: 0 | Status: ON HOLD | OUTPATIENT
Start: 2022-03-04 | End: 2022-04-18

## 2022-03-04 NOTE — PATIENT INSTRUCTIONS
Recommendations:  -We will have you start taking the Ritalin to try to help with energy as well as motivation.  Restarting a low-dose to make sure you tolerate this medicine, however it can be increased in the future if needed.  Start taking 1/2 tablet first thing in the morning for 7 days, then increase to one full tablet in the morning.  -When it is time for refill, please let me know how this dose has been for you and we can increase if necessary.  -Please keep an eye on appetite level, as the stimulant medications can sometimes decreased appetite for people.  -Recommend continuing the Zyprexa 1 tablet at bedtime as it does seem that this has helped both sleeping and appetite for you.    Follow up: About 3 months, but please call sooner with questions or concerns beforehand.      Reasons to Call    If you are having worsening/uncontrolled symptoms we want you to call!    You or your other physicians make any changes to medications we have prescribed.  -Please call for refills 4-5 days before you will run out of medication.    Important Phone Numbers    [facility] - Leila Clifford. Palliative Care RN - [phone]    [facility] - Elizabeth Mojica. Palliative Care RN - [phone]  *For Refills, scheduling, and general questions - [phone]  *After hours or on weekends. Will connect you with on call MD. [phone]

## 2022-03-04 NOTE — LETTER
3/4/2022         RE: Olga Bailey  [address] [place] [address]        Dear Colleague,    Thank you for referring your patient, Olga Bailey, to the [facility]. Please see a copy of my visit note below.    Olga is a 76 year old who is being evaluated via a billable video visit.      How would you like to obtain your AVS? MyChart  If the video visit is dropped, the invitation should be resent by: Send to e-mail at: [email or web address]  Will anyone else be joining your video visit? No     Laurie Kaminski VF        Palliative Care Progress Note    Patient Name: Olga Bailey  Primary Provider: Clinic, [facility]    Chief Complaint/Patient ID: 75 yo F with dx of glioblastoma s/p resection 02/2021 with prior depression/anxiety, HTN, asthma, GERD.Treatment course c/b dxs of PJP pneumonia, B/l DVTs, & retroperitoneal bleed in 03/2021 resulting in prolonged hospital stay. Completed radiation May 2021. Started chemo but had toxicity, changed to metronomic dosing of temodar in July. Most recent imaging with positive response to therapy.     -Mood worsened secondary to coping with long stay- has struggled with some symptoms of PTSD.    Last Palliative care appointment: 12/10/21 with me.  Referral to  LICSW for support.  Trial of Zyprexa 5 mg for appetite.  Stop Seroquel.     Reviewed: Yes, no concerns.    Interim History:  Olga Bailey 76 year old female is seen today for follow up with Palliative Care via billable video visit. Her friend Lakesha is present and provides additional history for the visit.     Reviewed oncology note from 3/1.  Recent imaging without any concern, so planning to continue off cancer directed therapy and follow-up with imaging surveillance.    Working with oncology PM&R for fatigue and deconditioning.  New orders for OT and PT have been sent to help focus on patient's deconditioning.    Started the Zyprexa for appetite, sleep, and  "anxiety.  Does feel there has been clear improvement in her sleep and does think that her appetite has been a little bit better.    Friend Lakesha has questions from patient's daughter.  They note she is quite drowsy during the daytime and feel this is affecting her ability to participate in therapy.  She although can to have decreased vision to do things.  They are wondering if she would be an okay candidate to try a stimulant medication.      Patient does express that her mood has continued to be \"up-and-down\" and feels that her lack of progress physically has been a big contributor to this. Established with [facility] for coping support.       Social History:  Born in [place], lived in [place] from age 4yrs to her 20s, then moved to [place]. 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   Social History     Tobacco Use     Smoking status: Never Smoker     Smokeless tobacco: Never Used   Substance Use Topics     Alcohol use: Yes     Comment: very rare     Drug use: No       Family History- Reviewed in Epic.    Allergies   Allergen Reactions     Pilocarpine Headache and Nausea and Vomiting     Chlorhexidine Itching and Rash     Aripiprazole Headache and Other (See Comments)     Insomnia, nightmares     Bacitracin Rash     Bactroban is effective; No difficulties     Erythromycin      intolerant.     Meperidine Hcl      intolerant only   Demerol     Chloraprep One Step Rash       Advanced Care Planning: HCD on file, names daughter as HCA. POLST on file states full code.    Medications- Reviewed in Epic.    Past Medical History- Reviewed in [facility].    Past Surgical History- Reviewed in Epic.    Review of Systems:   ROS: 10 point ROS neg other than the symptoms noted above in the HPI.      Physical Exam:   Constitutional: Alert, pleasant, no apparent distress. Sitting up in chair.  Eyes: Sclera non-icteric, no eye discharge.  ENT: No nasal discharge. Ears grossly normal.  Respiratory: Unlabored respirations. " Speaking in full sentences.  Musculoskeletal: Extremities appear normal- no gross deformities noted. No edema noted on upper body.   Skin: No suspicious lesions or rashes on visible skin.  Neurologic: Clear speech, no aphasia. No facial droop.  Psychiatric: Mentation appears slowed, appropriate attention. Affect flat does not appear anxious or depressed.      Key Data Reviewed:  LABS: 2/14/2022- Cr 1.44, GFR 38. WBC 5.4, Hgb 10.6, Plts 195.     IMAGING: CT head 2/24/2022- Impression:  1. No acute intracranial pathology.   2. Postsurgical changes of left parietal craniotomy with underlying small resection cavity.  3. Mild generalized parenchymal volume loss and moderate leukoaraiosis.   4. Unchanged right occipital subcentimeter calcified presumed meningioma.    Impression & Recommendations & Counseling:  Olga Bailey is a 76 year old female with history of glioblastoma, anxiety, and depression.    Fatigue  Depressed mood  Low motivation - These are likely multifactorial.  Fatigue, mood, and cognitive changes are affecting her ability to participate meaningfully in therapy like activities.  She was recently given new orders for home care from PM&R.  Discussed concern about stimulant medications possibly adversely affecting the appetite, so this is something we would need to keep an eye on.  -Okay to start low-dose Reglan.  Start with 2.5 mg in the morning and then can increase to 5 mg in the morning after 7 days.  -They will keep an eye on appetite as well as the effect of the medicine on her energy level.  We can increase this in the future as long as no adverse events.  -Agree with repeat referral to PT/OT.    Difficulty sleeping  Lack of appetite - Both seem to be improved with Zyprexa once daily at bedtime.  Had discussed possibly trying half dose during the day, however given ongoing fatigue, trial of stimulant medication first makes more sense.  -Continue Zyprexa 5 mg only at bedtime.      Follow up: About  3 months    Video-Visit Details  Video Start Time:  11:21 AM  Video End Time:  11:40 AM    Originating Location (pt. Location): Home     Distant Location (provider location): [facility]     Platform used for Video Visit: eCurvWell     Total time spent on day of encounter is 27 mins, including reviewing record, review of above studies, above visit with patient, symptomatic discussion of appetite, sleep, motivation/mood, and fatigue, including medication adjustments/prescription management, and documentation.     Maggi Plaza DO  Palliative Medicine   Pager [phone], [facility] ID [id number]    Some chart documentation performed using Dragon Voice recognition Software. Although reviewed after completion, some words and grammatical errors may remain.        Again, thank you for allowing me to participate in the care of your patient.        Sincerely,        Maggi Plaza DO

## 2022-03-04 NOTE — LETTER
3/4/2022         RE: Olga Bailey  [address] [place] [address]        Dear Colleague,    Thank you for referring your patient, Olga Bailey, to the [facility]. Please see a copy of my visit note below.    Olga is a 76 year old who is being evaluated via a billable video visit.      How would you like to obtain your AVS? MyChart  If the video visit is dropped, the invitation should be resent by: Send to e-mail at: [email or web address]  Will anyone else be joining your video visit? No     Laurie Kaminski VF        Palliative Care Progress Note    Patient Name: Olga Bailey  Primary Provider: Clinic, [facility]    Chief Complaint/Patient ID: 75 yo F with dx of glioblastoma s/p resection 02/2021 with prior depression/anxiety, HTN, asthma, GERD.Treatment course c/b dxs of PJP pneumonia, B/l DVTs, & retroperitoneal bleed in 03/2021 resulting in prolonged hospital stay. Completed radiation May 2021. Started chemo but had toxicity, changed to metronomic dosing of temodar in July. Most recent imaging with positive response to therapy.     -Mood worsened secondary to coping with long stay- has struggled with some symptoms of PTSD.    Last Palliative care appointment: 12/10/21 with me.  Referral to  LICSW for support.  Trial of Zyprexa 5 mg for appetite.  Stop Seroquel.     Reviewed: Yes, no concerns.    Interim History:  Olga Bailey 76 year old female is seen today for follow up with Palliative Care via billable video visit. Her friend Lakesha is present and provides additional history for the visit.     Reviewed oncology note from 3/1.  Recent imaging without any concern, so planning to continue off cancer directed therapy and follow-up with imaging surveillance.    Working with oncology PM&R for fatigue and deconditioning.  New orders for OT and PT have been sent to help focus on patient's deconditioning.    Started the Zyprexa for appetite, sleep, and  "anxiety.  Does feel there has been clear improvement in her sleep and does think that her appetite has been a little bit better.    Friend Lakesha has questions from patient's daughter.  They note she is quite drowsy during the daytime and feel this is affecting her ability to participate in therapy.  She although can to have decreased vision to do things.  They are wondering if she would be an okay candidate to try a stimulant medication.      Patient does express that her mood has continued to be \"up-and-down\" and feels that her lack of progress physically has been a big contributor to this. Established with [facility] for coping support.       Social History:  Born in [place], lived in [place] from age 4yrs to her 20s, then moved to [place]. 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   Social History     Tobacco Use     Smoking status: Never Smoker     Smokeless tobacco: Never Used   Substance Use Topics     Alcohol use: Yes     Comment: very rare     Drug use: No       Family History- Reviewed in Epic.    Allergies   Allergen Reactions     Pilocarpine Headache and Nausea and Vomiting     Chlorhexidine Itching and Rash     Aripiprazole Headache and Other (See Comments)     Insomnia, nightmares     Bacitracin Rash     Bactroban is effective; No difficulties     Erythromycin      intolerant.     Meperidine Hcl      intolerant only   Demerol     Chloraprep One Step Rash       Advanced Care Planning: HCD on file, names daughter as HCA. POLST on file states full code.    Medications- Reviewed in Epic.    Past Medical History- Reviewed in [facility].    Past Surgical History- Reviewed in Epic.    Review of Systems:   ROS: 10 point ROS neg other than the symptoms noted above in the HPI.      Physical Exam:   Constitutional: Alert, pleasant, no apparent distress. Sitting up in chair.  Eyes: Sclera non-icteric, no eye discharge.  ENT: No nasal discharge. Ears grossly normal.  Respiratory: Unlabored respirations. " Speaking in full sentences.  Musculoskeletal: Extremities appear normal- no gross deformities noted. No edema noted on upper body.   Skin: No suspicious lesions or rashes on visible skin.  Neurologic: Clear speech, no aphasia. No facial droop.  Psychiatric: Mentation appears slowed, appropriate attention. Affect flat does not appear anxious or depressed.      Key Data Reviewed:  LABS: 2/14/2022- Cr 1.44, GFR 38. WBC 5.4, Hgb 10.6, Plts 195.     IMAGING: CT head 2/24/2022- Impression:  1. No acute intracranial pathology.   2. Postsurgical changes of left parietal craniotomy with underlying small resection cavity.  3. Mild generalized parenchymal volume loss and moderate leukoaraiosis.   4. Unchanged right occipital subcentimeter calcified presumed meningioma.    Impression & Recommendations & Counseling:  Olga Bailey is a 76 year old female with history of glioblastoma, anxiety, and depression.    Fatigue  Depressed mood  Low motivation - These are likely multifactorial.  Fatigue, mood, and cognitive changes are affecting her ability to participate meaningfully in therapy like activities.  She was recently given new orders for home care from PM&R.  Discussed concern about stimulant medications possibly adversely affecting the appetite, so this is something we would need to keep an eye on.  -Okay to start low-dose Reglan.  Start with 2.5 mg in the morning and then can increase to 5 mg in the morning after 7 days.  -They will keep an eye on appetite as well as the effect of the medicine on her energy level.  We can increase this in the future as long as no adverse events.  -Agree with repeat referral to PT/OT.    Difficulty sleeping  Lack of appetite - Both seem to be improved with Zyprexa once daily at bedtime.  Had discussed possibly trying half dose during the day, however given ongoing fatigue, trial of stimulant medication first makes more sense.  -Continue Zyprexa 5 mg only at bedtime.      Follow up: About  3 months    Video-Visit Details  Video Start Time:  11:21 AM  Video End Time:  11:40 AM    Originating Location (pt. Location): Home     Distant Location (provider location): [facility]     Platform used for Video Visit: PlacedWell     Total time spent on day of encounter is 27 mins, including reviewing record, review of above studies, above visit with patient, symptomatic discussion of appetite, sleep, motivation/mood, and fatigue, including medication adjustments/prescription management, and documentation.     Maggi Plaza DO  Palliative Medicine   Pager [phone], [facility] ID [id number]    Some chart documentation performed using Dragon Voice recognition Software. Although reviewed after completion, some words and grammatical errors may remain.        Again, thank you for allowing me to participate in the care of your patient.        Sincerely,        Maggi Plaza DO

## 2022-03-07 ENCOUNTER — PATIENT OUTREACH (OUTPATIENT)
Dept: ONCOLOGY | Facility: CLINIC | Age: 77
End: 2022-03-07
Payer: MEDICARE

## 2022-03-07 NOTE — PROGRESS NOTES
Received a call from Shilpa with [facility].  She states that they received a home care referral from Dr. Agudelo.  They are declining the referral due to capacity.    Routing to RNCC Kathryn for awareness.    Erich Hodges, JIANN, RN, OCN  Oncology Care Coordinator  [facility]   no chest pain, no cough, and no shortness of breath.

## 2022-03-08 ENCOUNTER — PATIENT OUTREACH (OUTPATIENT)
Dept: CARE COORDINATION | Facility: CLINIC | Age: 77
End: 2022-03-08
Payer: MEDICARE

## 2022-03-08 NOTE — Clinical Note
Hi Team, I just spoke with La Nena (as Olga scored positive on distress screen). She conveyed a concern that Olga's mind has changed so much due to treatment, is wondering about effectiveness of anti-depressant, and wondering if a trial of a new medication could be helpful.   Dr. Chen-/Leila- can you weigh in here and follow-up with La Nena?  La Nena did state that her PRAVEEN is initiating hospice services (I see notes from RNCCs about locating home care agencies)- I wonder if it would be worth reaching out to [facility] and seeing if they can accommodate PT/OT request from Dr. Agudelo?  Thanks all! -Sherine

## 2022-03-08 NOTE — PROGRESS NOTES
"Oncology Distress Screening Follow-up  Clinical Social Work  [facility]    Identified Concern and Score From Distress Screenin. How concerned are you about your ability to eat?  2       2. How concerned are you about unintended weight loss or your current weight?  0       3. How concerned are you about feeling depressed or very sad?  7 Abnormal        4. How concerned are you about feeling anxious or very scared?  2       5. Do you struggle with the loss of meaning and layla in your life?  Somewhat       6. How concerned are you about work and home life issues that may be affected by your cancer?  0       7. How concerned are you about knowing what resources are available to help you?  5       8. Do you currently have what you would describe as Quaker or spiritual struggles?             Not at all         Date of Distress Screening: 3/4/2022    Intervention:   Olga is a 76-year-old woman with a diagnosis of glioblastoma who is followed by Dr. Agudelo and Dr. Plaza at [facility]. At time of visit with Dr. Plaza, Olga scored positive on oncology distress screen. This clinician called Olga today with intention of following up on elevated distress, and orienting to role of oncology social worker as a part of care team.     This clinician called and spoke with daughter La Nena who endorsed that mother has been more depressed, is spending \"all day in a chair.\" La Nena with concerns that anti-depressant is not working, wondering if changes due to treatment have made medication less effective. La Nena interested if a medication change might be helpful to support mood. La Nena reports that she understands that they will do a trial of a stimulant to see if that positively affects pt.     La Nena reports that she believes Olga is in need of in-person weekly therapy, and elected to discontinue therapy with Ruth Samaniego as she was not able to accommodate weekly " need. CAMRON contacted RN at [facility] who reported that they do not contact with any therapy providers. SW called to update La Nena that [facility] and [facility] likely have the ability for in-person therapeutic connection if she desires.     La Nena reported that her sister-in-law has initiated hospice for Olga through [facility]. La Nena was hoping that Olga could engage more with OT/PT at Dr. Agudelo's recommendation before engaging with hospice services. SW encouraged her to inquire about what OT/PT services might be available through [facility], and reminded of hospice social work support available.     Follow-up Required:   1) La Nena would appreciate update from palliative care RN regarding if palliative care team would recommend medication change for mood.   2) Onc SW will continue to be available as needed for ongoing psychosocial support.     MACARIO Shipley, LICSW  Phone: [phone]  [facility]: M, Thu  *every other Tue, 8am-4:30pm  [facility]: W, F, *every other Tue, 8am-4:30pm

## 2022-03-11 ASSESSMENT — ENCOUNTER SYMPTOMS
ARTHRALGIAS: 0
CARDIOVASCULAR NEGATIVE: 1
GASTROINTESTINAL NEGATIVE: 1
WOUND: 0
SHORTNESS OF BREATH: 0
ABDOMINAL PAIN: 0
CONSTITUTIONAL NEGATIVE: 1
RESPIRATORY NEGATIVE: 1

## 2022-03-18 ENCOUNTER — TELEPHONE (OUTPATIENT)
Dept: PALLIATIVE CARE | Facility: CLINIC | Age: 77
End: 2022-03-18
Payer: MEDICARE

## 2022-04-01 ENCOUNTER — LAB REQUISITION (OUTPATIENT)
Dept: LAB | Facility: CLINIC | Age: 77
End: 2022-04-01
Payer: MEDICARE

## 2022-04-01 DIAGNOSIS — E87.6 HYPOKALEMIA: ICD-10-CM

## 2022-04-01 DIAGNOSIS — R35.0 FREQUENCY OF MICTURITION: ICD-10-CM

## 2022-04-01 LAB
ALBUMIN UR-MCNC: NEGATIVE MG/DL
APPEARANCE UR: CLEAR
BILIRUB UR QL STRIP: NEGATIVE
COLOR UR AUTO: ABNORMAL
GLUCOSE UR STRIP-MCNC: NEGATIVE MG/DL
HGB UR QL STRIP: ABNORMAL
HYALINE CASTS: 4 /LPF
KETONES UR STRIP-MCNC: NEGATIVE MG/DL
LEUKOCYTE ESTERASE UR QL STRIP: NEGATIVE
MUCOUS THREADS #/AREA URNS LPF: PRESENT /LPF
NITRATE UR QL: NEGATIVE
PH UR STRIP: 5 [PH] (ref 5–7)
RBC URINE: 5 /HPF
SP GR UR STRIP: 1.01 (ref 1–1.03)
TRANSITIONAL EPI: 2 /HPF
UROBILINOGEN UR STRIP-MCNC: <2 MG/DL
WBC URINE: 4 /HPF

## 2022-04-01 PROCEDURE — 87086 URINE CULTURE/COLONY COUNT: CPT | Mod: ORL

## 2022-04-01 PROCEDURE — 81001 URINALYSIS AUTO W/SCOPE: CPT | Mod: ORL

## 2022-04-03 LAB — BACTERIA UR CULT: NORMAL

## 2022-04-04 ENCOUNTER — HOSPITAL ENCOUNTER (INPATIENT)
Facility: CLINIC | Age: 77
LOS: 14 days | Discharge: NURSING, REHAB OR LONG-TERM CARE | DRG: 871 | End: 2022-04-18
Attending: EMERGENCY MEDICINE | Admitting: INTERNAL MEDICINE
Payer: MEDICARE

## 2022-04-04 ENCOUNTER — APPOINTMENT (OUTPATIENT)
Dept: MRI IMAGING | Facility: CLINIC | Age: 77
DRG: 871 | End: 2022-04-04
Attending: EMERGENCY MEDICINE
Payer: MEDICARE

## 2022-04-04 ENCOUNTER — APPOINTMENT (OUTPATIENT)
Dept: GENERAL RADIOLOGY | Facility: CLINIC | Age: 77
DRG: 871 | End: 2022-04-04
Attending: EMERGENCY MEDICINE
Payer: MEDICARE

## 2022-04-04 ENCOUNTER — MEDICAL CORRESPONDENCE (OUTPATIENT)
Dept: HEALTH INFORMATION MANAGEMENT | Facility: CLINIC | Age: 77
End: 2022-04-04

## 2022-04-04 DIAGNOSIS — F33.9 EPISODE OF RECURRENT MAJOR DEPRESSIVE DISORDER, UNSPECIFIED DEPRESSION EPISODE SEVERITY (H): ICD-10-CM

## 2022-04-04 DIAGNOSIS — F43.21 ADJUSTMENT DISORDER WITH DEPRESSED MOOD: ICD-10-CM

## 2022-04-04 DIAGNOSIS — J96.01 ACUTE RESPIRATORY FAILURE WITH HYPOXIA (H): ICD-10-CM

## 2022-04-04 DIAGNOSIS — J18.9 PNEUMONIA OF LEFT LUNG DUE TO INFECTIOUS ORGANISM, UNSPECIFIED PART OF LUNG: ICD-10-CM

## 2022-04-04 DIAGNOSIS — J69.0 ASPIRATION PNEUMONIA OF BOTH LOWER LOBES, UNSPECIFIED ASPIRATION PNEUMONIA TYPE (H): ICD-10-CM

## 2022-04-04 DIAGNOSIS — G47.00 INSOMNIA, UNSPECIFIED TYPE: ICD-10-CM

## 2022-04-04 DIAGNOSIS — B00.1 COLD SORE: ICD-10-CM

## 2022-04-04 DIAGNOSIS — E43 SEVERE MALNUTRITION (H): ICD-10-CM

## 2022-04-04 DIAGNOSIS — J84.10 PULMONARY FIBROSIS (H): ICD-10-CM

## 2022-04-04 DIAGNOSIS — R53.0 NEOPLASTIC MALIGNANT RELATED FATIGUE: ICD-10-CM

## 2022-04-04 LAB
ALBUMIN UR-MCNC: 30 MG/DL
AMORPH CRY #/AREA URNS HPF: ABNORMAL /HPF
ANION GAP SERPL CALCULATED.3IONS-SCNC: 7 MMOL/L (ref 3–14)
APPEARANCE UR: CLEAR
BASOPHILS # BLD AUTO: 0 10E3/UL (ref 0–0.2)
BASOPHILS NFR BLD AUTO: 0 %
BILIRUB UR QL STRIP: NEGATIVE
BUN SERPL-MCNC: 22 MG/DL (ref 7–30)
CALCIUM SERPL-MCNC: 9.1 MG/DL (ref 8.5–10.1)
CHLORIDE BLD-SCNC: 103 MMOL/L (ref 94–109)
CO2 SERPL-SCNC: 29 MMOL/L (ref 20–32)
COLOR UR AUTO: YELLOW
CREAT SERPL-MCNC: 1.42 MG/DL (ref 0.52–1.04)
EOSINOPHIL # BLD AUTO: 0.1 10E3/UL (ref 0–0.7)
EOSINOPHIL NFR BLD AUTO: 2 %
ERYTHROCYTE [DISTWIDTH] IN BLOOD BY AUTOMATED COUNT: 12.1 % (ref 10–15)
FLUAV RNA SPEC QL NAA+PROBE: NEGATIVE
FLUBV RNA RESP QL NAA+PROBE: NEGATIVE
GFR SERPL CREATININE-BSD FRML MDRD: 38 ML/MIN/1.73M2
GLUCOSE BLD-MCNC: 216 MG/DL (ref 70–99)
GLUCOSE BLDC GLUCOMTR-MCNC: 197 MG/DL (ref 70–99)
GLUCOSE UR STRIP-MCNC: NEGATIVE MG/DL
HCT VFR BLD AUTO: 31.1 % (ref 35–47)
HGB BLD-MCNC: 9.7 G/DL (ref 11.7–15.7)
HGB UR QL STRIP: ABNORMAL
HOLD SPECIMEN: NORMAL
HYALINE CASTS: 12 /LPF
IMM GRANULOCYTES # BLD: 0 10E3/UL
IMM GRANULOCYTES NFR BLD: 1 %
KETONES UR STRIP-MCNC: NEGATIVE MG/DL
LACTATE SERPL-SCNC: 0.9 MMOL/L (ref 0.7–2)
LEUKOCYTE ESTERASE UR QL STRIP: ABNORMAL
LYMPHOCYTES # BLD AUTO: 0.4 10E3/UL (ref 0.8–5.3)
LYMPHOCYTES NFR BLD AUTO: 7 %
MCH RBC QN AUTO: 31.7 PG (ref 26.5–33)
MCHC RBC AUTO-ENTMCNC: 31.2 G/DL (ref 31.5–36.5)
MCV RBC AUTO: 102 FL (ref 78–100)
MONOCYTES # BLD AUTO: 0.7 10E3/UL (ref 0–1.3)
MONOCYTES NFR BLD AUTO: 12 %
MUCOUS THREADS #/AREA URNS LPF: PRESENT /LPF
NEUTROPHILS # BLD AUTO: 4.6 10E3/UL (ref 1.6–8.3)
NEUTROPHILS NFR BLD AUTO: 78 %
NITRATE UR QL: NEGATIVE
NRBC # BLD AUTO: 0 10E3/UL
NRBC BLD AUTO-RTO: 0 /100
NT-PROBNP SERPL-MCNC: 124 PG/ML (ref 0–1800)
PH UR STRIP: 5.5 [PH] (ref 5–7)
PLATELET # BLD AUTO: 171 10E3/UL (ref 150–450)
POTASSIUM BLD-SCNC: 3 MMOL/L (ref 3.5–5)
POTASSIUM BLD-SCNC: 3.2 MMOL/L (ref 3.4–5.3)
PROCALCITONIN SERPL-MCNC: 0.26 NG/ML
RBC # BLD AUTO: 3.06 10E6/UL (ref 3.8–5.2)
RBC URINE: 4 /HPF
RSV RNA SPEC NAA+PROBE: NEGATIVE
SARS-COV-2 RNA RESP QL NAA+PROBE: NEGATIVE
SODIUM SERPL-SCNC: 139 MMOL/L (ref 133–144)
SP GR UR STRIP: 1.02 (ref 1–1.03)
TROPONIN I SERPL HS-MCNC: 6 NG/L
UROBILINOGEN UR STRIP-MCNC: NORMAL MG/DL
WBC # BLD AUTO: 5.8 10E3/UL (ref 4–11)
WBC URINE: 2 /HPF

## 2022-04-04 PROCEDURE — 93005 ELECTROCARDIOGRAM TRACING: CPT

## 2022-04-04 PROCEDURE — 83880 ASSAY OF NATRIURETIC PEPTIDE: CPT | Performed by: EMERGENCY MEDICINE

## 2022-04-04 PROCEDURE — 80048 BASIC METABOLIC PNL TOTAL CA: CPT | Mod: ORL

## 2022-04-04 PROCEDURE — 36415 COLL VENOUS BLD VENIPUNCTURE: CPT | Mod: ORL

## 2022-04-04 PROCEDURE — 84484 ASSAY OF TROPONIN QUANT: CPT | Performed by: EMERGENCY MEDICINE

## 2022-04-04 PROCEDURE — 85004 AUTOMATED DIFF WBC COUNT: CPT | Performed by: EMERGENCY MEDICINE

## 2022-04-04 PROCEDURE — 120N000001 HC R&B MED SURG/OB

## 2022-04-04 PROCEDURE — 36415 COLL VENOUS BLD VENIPUNCTURE: CPT | Performed by: EMERGENCY MEDICINE

## 2022-04-04 PROCEDURE — 255N000002 HC RX 255 OP 636: Performed by: EMERGENCY MEDICINE

## 2022-04-04 PROCEDURE — C9803 HOPD COVID-19 SPEC COLLECT: HCPCS

## 2022-04-04 PROCEDURE — G1004 CDSM NDSC: HCPCS

## 2022-04-04 PROCEDURE — 99222 1ST HOSP IP/OBS MODERATE 55: CPT | Mod: AI | Performed by: INTERNAL MEDICINE

## 2022-04-04 PROCEDURE — 99285 EMERGENCY DEPT VISIT HI MDM: CPT | Mod: 25

## 2022-04-04 PROCEDURE — P9603 ONE-WAY ALLOW PRORATED MILES: HCPCS | Mod: ORL

## 2022-04-04 PROCEDURE — 70553 MRI BRAIN STEM W/O & W/DYE: CPT

## 2022-04-04 PROCEDURE — 87040 BLOOD CULTURE FOR BACTERIA: CPT | Performed by: EMERGENCY MEDICINE

## 2022-04-04 PROCEDURE — 83605 ASSAY OF LACTIC ACID: CPT | Performed by: EMERGENCY MEDICINE

## 2022-04-04 PROCEDURE — 71046 X-RAY EXAM CHEST 2 VIEWS: CPT

## 2022-04-04 PROCEDURE — 87637 SARSCOV2&INF A&B&RSV AMP PRB: CPT | Performed by: EMERGENCY MEDICINE

## 2022-04-04 PROCEDURE — 84145 PROCALCITONIN (PCT): CPT | Performed by: EMERGENCY MEDICINE

## 2022-04-04 PROCEDURE — 81001 URINALYSIS AUTO W/SCOPE: CPT | Performed by: EMERGENCY MEDICINE

## 2022-04-04 PROCEDURE — A9585 GADOBUTROL INJECTION: HCPCS | Performed by: EMERGENCY MEDICINE

## 2022-04-04 PROCEDURE — 70549 MR ANGIOGRAPH NECK W/O&W/DYE: CPT

## 2022-04-04 PROCEDURE — 84132 ASSAY OF SERUM POTASSIUM: CPT | Performed by: EMERGENCY MEDICINE

## 2022-04-04 RX ORDER — LEVOFLOXACIN 5 MG/ML
750 INJECTION, SOLUTION INTRAVENOUS ONCE
Status: DISCONTINUED | OUTPATIENT
Start: 2022-04-04 | End: 2022-04-04

## 2022-04-04 RX ORDER — GADOBUTROL 604.72 MG/ML
10 INJECTION INTRAVENOUS ONCE
Status: COMPLETED | OUTPATIENT
Start: 2022-04-04 | End: 2022-04-04

## 2022-04-04 RX ADMIN — GADOBUTROL 10 ML: 604.72 INJECTION INTRAVENOUS at 20:36

## 2022-04-04 ASSESSMENT — ACTIVITIES OF DAILY LIVING (ADL): ADLS_ACUITY_SCORE: 16

## 2022-04-05 ENCOUNTER — APPOINTMENT (OUTPATIENT)
Dept: SPEECH THERAPY | Facility: CLINIC | Age: 77
DRG: 871 | End: 2022-04-05
Attending: HOSPITALIST
Payer: MEDICARE

## 2022-04-05 LAB
ANION GAP SERPL CALCULATED.3IONS-SCNC: 4 MMOL/L (ref 3–14)
ATRIAL RATE - MUSE: 97 BPM
BASOPHILS # BLD AUTO: 0 10E3/UL (ref 0–0.2)
BASOPHILS NFR BLD AUTO: 0 %
BUN SERPL-MCNC: 21 MG/DL (ref 7–30)
CALCIUM SERPL-MCNC: 8.6 MG/DL (ref 8.5–10.1)
CHLORIDE BLD-SCNC: 108 MMOL/L (ref 94–109)
CO2 SERPL-SCNC: 30 MMOL/L (ref 20–32)
CREAT SERPL-MCNC: 1.27 MG/DL (ref 0.52–1.04)
DIASTOLIC BLOOD PRESSURE - MUSE: NORMAL MMHG
EOSINOPHIL # BLD AUTO: 0.2 10E3/UL (ref 0–0.7)
EOSINOPHIL NFR BLD AUTO: 6 %
ERYTHROCYTE [DISTWIDTH] IN BLOOD BY AUTOMATED COUNT: 12.1 % (ref 10–15)
GFR SERPL CREATININE-BSD FRML MDRD: 44 ML/MIN/1.73M2
GLUCOSE BLD-MCNC: 97 MG/DL (ref 70–99)
HCT VFR BLD AUTO: 25.8 % (ref 35–47)
HGB BLD-MCNC: 8 G/DL (ref 11.7–15.7)
HGB BLD-MCNC: 8.3 G/DL (ref 11.7–15.7)
IMM GRANULOCYTES # BLD: 0 10E3/UL
IMM GRANULOCYTES NFR BLD: 1 %
INTERPRETATION ECG - MUSE: NORMAL
LACTATE SERPL-SCNC: 1.1 MMOL/L (ref 0.7–2)
LYMPHOCYTES # BLD AUTO: 0.3 10E3/UL (ref 0.8–5.3)
LYMPHOCYTES NFR BLD AUTO: 9 %
MCH RBC QN AUTO: 31.3 PG (ref 26.5–33)
MCHC RBC AUTO-ENTMCNC: 31 G/DL (ref 31.5–36.5)
MCV RBC AUTO: 101 FL (ref 78–100)
MONOCYTES # BLD AUTO: 0.5 10E3/UL (ref 0–1.3)
MONOCYTES NFR BLD AUTO: 14 %
NEUTROPHILS # BLD AUTO: 2.5 10E3/UL (ref 1.6–8.3)
NEUTROPHILS NFR BLD AUTO: 70 %
NRBC # BLD AUTO: 0 10E3/UL
NRBC BLD AUTO-RTO: 0 /100
P AXIS - MUSE: 22 DEGREES
PLATELET # BLD AUTO: 140 10E3/UL (ref 150–450)
POTASSIUM BLD-SCNC: 3.1 MMOL/L (ref 3.4–5.3)
POTASSIUM BLD-SCNC: 3.3 MMOL/L (ref 3.4–5.3)
POTASSIUM BLD-SCNC: 3.8 MMOL/L (ref 3.4–5.3)
PR INTERVAL - MUSE: 188 MS
QRS DURATION - MUSE: 84 MS
QT - MUSE: 382 MS
QTC - MUSE: 485 MS
R AXIS - MUSE: 0 DEGREES
RBC # BLD AUTO: 2.56 10E6/UL (ref 3.8–5.2)
SODIUM SERPL-SCNC: 142 MMOL/L (ref 133–144)
SYSTOLIC BLOOD PRESSURE - MUSE: NORMAL MMHG
T AXIS - MUSE: -6 DEGREES
VENTRICULAR RATE- MUSE: 97 BPM
WBC # BLD AUTO: 3.6 10E3/UL (ref 4–11)

## 2022-04-05 PROCEDURE — 87040 BLOOD CULTURE FOR BACTERIA: CPT | Performed by: INTERNAL MEDICINE

## 2022-04-05 PROCEDURE — 250N000011 HC RX IP 250 OP 636: Performed by: INTERNAL MEDICINE

## 2022-04-05 PROCEDURE — 258N000003 HC RX IP 258 OP 636: Performed by: INTERNAL MEDICINE

## 2022-04-05 PROCEDURE — 80048 BASIC METABOLIC PNL TOTAL CA: CPT | Performed by: INTERNAL MEDICINE

## 2022-04-05 PROCEDURE — 36415 COLL VENOUS BLD VENIPUNCTURE: CPT | Performed by: INTERNAL MEDICINE

## 2022-04-05 PROCEDURE — 92610 EVALUATE SWALLOWING FUNCTION: CPT | Mod: GN

## 2022-04-05 PROCEDURE — 85025 COMPLETE CBC W/AUTO DIFF WBC: CPT | Performed by: INTERNAL MEDICINE

## 2022-04-05 PROCEDURE — 120N000001 HC R&B MED SURG/OB

## 2022-04-05 PROCEDURE — 250N000013 HC RX MED GY IP 250 OP 250 PS 637: Performed by: HOSPITALIST

## 2022-04-05 PROCEDURE — 85014 HEMATOCRIT: CPT | Performed by: INTERNAL MEDICINE

## 2022-04-05 PROCEDURE — 99233 SBSQ HOSP IP/OBS HIGH 50: CPT | Performed by: HOSPITALIST

## 2022-04-05 PROCEDURE — 83605 ASSAY OF LACTIC ACID: CPT | Performed by: HOSPITALIST

## 2022-04-05 PROCEDURE — 250N000013 HC RX MED GY IP 250 OP 250 PS 637: Performed by: INTERNAL MEDICINE

## 2022-04-05 PROCEDURE — 36415 COLL VENOUS BLD VENIPUNCTURE: CPT | Performed by: HOSPITALIST

## 2022-04-05 PROCEDURE — 84132 ASSAY OF SERUM POTASSIUM: CPT | Performed by: HOSPITALIST

## 2022-04-05 PROCEDURE — 85018 HEMOGLOBIN: CPT | Performed by: HOSPITALIST

## 2022-04-05 PROCEDURE — 84132 ASSAY OF SERUM POTASSIUM: CPT | Performed by: INTERNAL MEDICINE

## 2022-04-05 RX ORDER — ALBUTEROL SULFATE 0.83 MG/ML
2.5 SOLUTION RESPIRATORY (INHALATION) EVERY 4 HOURS PRN
Status: DISCONTINUED | OUTPATIENT
Start: 2022-04-05 | End: 2022-04-18 | Stop reason: HOSPADM

## 2022-04-05 RX ORDER — LEVETIRACETAM 1000 MG/1
1000 TABLET ORAL AT BEDTIME
Status: ON HOLD | COMMUNITY
End: 2024-05-05

## 2022-04-05 RX ORDER — CEFTRIAXONE 2 G/1
2 INJECTION, POWDER, FOR SOLUTION INTRAMUSCULAR; INTRAVENOUS EVERY 24 HOURS
Status: DISCONTINUED | OUTPATIENT
Start: 2022-04-06 | End: 2022-04-06

## 2022-04-05 RX ORDER — MIRTAZAPINE 15 MG/1
15 TABLET, FILM COATED ORAL AT BEDTIME
Status: DISCONTINUED | OUTPATIENT
Start: 2022-04-05 | End: 2022-04-15

## 2022-04-05 RX ORDER — MULTIVITAMIN,THERAPEUTIC
1 TABLET ORAL DAILY
Status: DISCONTINUED | OUTPATIENT
Start: 2022-04-05 | End: 2022-04-18 | Stop reason: HOSPADM

## 2022-04-05 RX ORDER — POTASSIUM CHLORIDE 1.5 G/1.58G
20 POWDER, FOR SOLUTION ORAL ONCE
Status: COMPLETED | OUTPATIENT
Start: 2022-04-05 | End: 2022-04-05

## 2022-04-05 RX ORDER — ACETAMINOPHEN 325 MG/1
650 TABLET ORAL EVERY 4 HOURS PRN
Status: DISCONTINUED | OUTPATIENT
Start: 2022-04-05 | End: 2022-04-18 | Stop reason: HOSPADM

## 2022-04-05 RX ORDER — BUSPIRONE HYDROCHLORIDE 15 MG/1
30 TABLET ORAL 2 TIMES DAILY
Status: DISCONTINUED | OUTPATIENT
Start: 2022-04-05 | End: 2022-04-18 | Stop reason: HOSPADM

## 2022-04-05 RX ORDER — QUETIAPINE FUMARATE 25 MG/1
50 TABLET, FILM COATED ORAL AT BEDTIME
Status: DISCONTINUED | OUTPATIENT
Start: 2022-04-05 | End: 2022-04-07

## 2022-04-05 RX ORDER — KETOCONAZOLE 20 MG/ML
SHAMPOO TOPICAL
Status: ON HOLD | COMMUNITY
End: 2023-11-27

## 2022-04-05 RX ORDER — CALCIUM POLYCARBOPHIL 625 MG 625 MG/1
2 TABLET ORAL DAILY
Status: ON HOLD | COMMUNITY
End: 2022-04-05

## 2022-04-05 RX ORDER — MIRTAZAPINE 15 MG/1
15 TABLET, FILM COATED ORAL AT BEDTIME
COMMUNITY
End: 2022-04-18

## 2022-04-05 RX ORDER — ALBUTEROL SULFATE 90 UG/1
2 AEROSOL, METERED RESPIRATORY (INHALATION) EVERY 4 HOURS PRN
Status: DISCONTINUED | OUTPATIENT
Start: 2022-04-05 | End: 2022-04-18 | Stop reason: HOSPADM

## 2022-04-05 RX ORDER — LIDOCAINE 40 MG/G
CREAM TOPICAL
Status: DISCONTINUED | OUTPATIENT
Start: 2022-04-05 | End: 2022-04-18 | Stop reason: HOSPADM

## 2022-04-05 RX ORDER — DOXYCYCLINE 100 MG/10ML
100 INJECTION, POWDER, LYOPHILIZED, FOR SOLUTION INTRAVENOUS EVERY 12 HOURS
Status: DISCONTINUED | OUTPATIENT
Start: 2022-04-05 | End: 2022-04-09

## 2022-04-05 RX ORDER — PANTOPRAZOLE SODIUM 40 MG/1
40 TABLET, DELAYED RELEASE ORAL
Status: DISCONTINUED | OUTPATIENT
Start: 2022-04-05 | End: 2022-04-17

## 2022-04-05 RX ORDER — LACTULOSE 10 G/15ML
20 SOLUTION ORAL DAILY PRN
Status: ON HOLD | COMMUNITY
End: 2022-04-06

## 2022-04-05 RX ORDER — POTASSIUM CHLORIDE 20MEQ/15ML
20 LIQUID (ML) ORAL DAILY
Status: ON HOLD | COMMUNITY
End: 2022-04-18

## 2022-04-05 RX ORDER — LEVETIRACETAM 250 MG/1
250 TABLET ORAL 2 TIMES DAILY
Status: DISCONTINUED | OUTPATIENT
Start: 2022-04-05 | End: 2022-04-18 | Stop reason: HOSPADM

## 2022-04-05 RX ORDER — ONDANSETRON 4 MG/1
4 TABLET, ORALLY DISINTEGRATING ORAL EVERY 6 HOURS PRN
Status: DISCONTINUED | OUTPATIENT
Start: 2022-04-05 | End: 2022-04-18 | Stop reason: HOSPADM

## 2022-04-05 RX ORDER — SODIUM CHLORIDE 9 MG/ML
INJECTION, SOLUTION INTRAVENOUS CONTINUOUS
Status: DISCONTINUED | OUTPATIENT
Start: 2022-04-05 | End: 2022-04-06

## 2022-04-05 RX ORDER — MIRABEGRON 25 MG/1
25 TABLET, FILM COATED, EXTENDED RELEASE ORAL DAILY
Status: DISCONTINUED | OUTPATIENT
Start: 2022-04-05 | End: 2022-04-18 | Stop reason: HOSPADM

## 2022-04-05 RX ORDER — MIRABEGRON 25 MG/1
25 TABLET, FILM COATED, EXTENDED RELEASE ORAL DAILY
COMMUNITY
End: 2022-10-04

## 2022-04-05 RX ORDER — CEFTRIAXONE 1 G/1
1 INJECTION, POWDER, FOR SOLUTION INTRAMUSCULAR; INTRAVENOUS EVERY 24 HOURS
Status: DISCONTINUED | OUTPATIENT
Start: 2022-04-05 | End: 2022-04-05

## 2022-04-05 RX ORDER — ONDANSETRON 2 MG/ML
4 INJECTION INTRAMUSCULAR; INTRAVENOUS EVERY 6 HOURS PRN
Status: DISCONTINUED | OUTPATIENT
Start: 2022-04-05 | End: 2022-04-18 | Stop reason: HOSPADM

## 2022-04-05 RX ORDER — ACETAMINOPHEN 500 MG
500 TABLET ORAL EVERY 4 HOURS PRN
Status: ON HOLD | COMMUNITY
End: 2023-11-27

## 2022-04-05 RX ORDER — POTASSIUM CHLORIDE 7.45 MG/ML
10 INJECTION INTRAVENOUS
Status: COMPLETED | OUTPATIENT
Start: 2022-04-05 | End: 2022-04-05

## 2022-04-05 RX ORDER — LEVETIRACETAM 500 MG/1
1000 TABLET ORAL 2 TIMES DAILY
Status: DISCONTINUED | OUTPATIENT
Start: 2022-04-05 | End: 2022-04-18 | Stop reason: HOSPADM

## 2022-04-05 RX ORDER — LEVETIRACETAM 250 MG/1
500 TABLET ORAL
Status: ON HOLD | COMMUNITY
End: 2024-05-05

## 2022-04-05 RX ORDER — OLANZAPINE 5 MG/1
5 TABLET ORAL AT BEDTIME
Status: DISCONTINUED | OUTPATIENT
Start: 2022-04-05 | End: 2022-04-09

## 2022-04-05 RX ORDER — TRAZODONE HYDROCHLORIDE 50 MG/1
25 TABLET, FILM COATED ORAL
Status: ON HOLD | COMMUNITY
End: 2022-04-18

## 2022-04-05 RX ADMIN — POTASSIUM CHLORIDE 10 MEQ: 7.46 INJECTION, SOLUTION INTRAVENOUS at 03:13

## 2022-04-05 RX ADMIN — POTASSIUM CHLORIDE 20 MEQ: 1.5 POWDER, FOR SOLUTION ORAL at 07:05

## 2022-04-05 RX ADMIN — DOXYCYCLINE 100 MG: 100 INJECTION, POWDER, LYOPHILIZED, FOR SOLUTION INTRAVENOUS at 15:53

## 2022-04-05 RX ADMIN — POTASSIUM CHLORIDE 10 MEQ: 7.46 INJECTION, SOLUTION INTRAVENOUS at 04:23

## 2022-04-05 RX ADMIN — LEVETIRACETAM 1000 MG: 500 TABLET, FILM COATED ORAL at 12:14

## 2022-04-05 RX ADMIN — PANTOPRAZOLE SODIUM 40 MG: 40 TABLET, DELAYED RELEASE ORAL at 15:53

## 2022-04-05 RX ADMIN — OLANZAPINE 5 MG: 5 TABLET, FILM COATED ORAL at 21:32

## 2022-04-05 RX ADMIN — ACETAMINOPHEN 650 MG: 325 TABLET, FILM COATED ORAL at 22:35

## 2022-04-05 RX ADMIN — QUETIAPINE FUMARATE 50 MG: 25 TABLET ORAL at 21:32

## 2022-04-05 RX ADMIN — LEVETIRACETAM 250 MG: 250 TABLET, FILM COATED ORAL at 21:32

## 2022-04-05 RX ADMIN — SODIUM CHLORIDE, PRESERVATIVE FREE: 5 INJECTION INTRAVENOUS at 01:12

## 2022-04-05 RX ADMIN — BUSPIRONE HYDROCHLORIDE 30 MG: 15 TABLET ORAL at 21:32

## 2022-04-05 RX ADMIN — SODIUM CHLORIDE, PRESERVATIVE FREE: 5 INJECTION INTRAVENOUS at 14:13

## 2022-04-05 RX ADMIN — LEVETIRACETAM 1000 MG: 500 TABLET, FILM COATED ORAL at 21:32

## 2022-04-05 RX ADMIN — CEFTRIAXONE 1 G: 1 INJECTION, POWDER, FOR SOLUTION INTRAMUSCULAR; INTRAVENOUS at 01:12

## 2022-04-05 RX ADMIN — LEVETIRACETAM 250 MG: 250 TABLET, FILM COATED ORAL at 13:24

## 2022-04-05 RX ADMIN — FLUOXETINE 60 MG: 20 CAPSULE ORAL at 12:15

## 2022-04-05 RX ADMIN — DOXYCYCLINE 100 MG: 100 INJECTION, POWDER, LYOPHILIZED, FOR SOLUTION INTRAVENOUS at 05:28

## 2022-04-05 RX ADMIN — MIRTAZAPINE 15 MG: 15 TABLET, FILM COATED ORAL at 21:32

## 2022-04-05 RX ADMIN — THERA TABS 1 TABLET: TAB at 15:52

## 2022-04-05 RX ADMIN — MIRABEGRON 25 MG: 25 TABLET, FILM COATED, EXTENDED RELEASE ORAL at 13:24

## 2022-04-05 RX ADMIN — BUSPIRONE HYDROCHLORIDE 30 MG: 15 TABLET ORAL at 13:25

## 2022-04-05 ASSESSMENT — ACTIVITIES OF DAILY LIVING (ADL)
ADLS_ACUITY_SCORE: 16
WEAR_GLASSES_OR_BLIND: YES
TRANSFERRING: 2-->COMPLETELY DEPENDENT
TOILETING_ISSUES: NO
DRESSING/BATHING: BATHING DIFFICULTY, REQUIRES EQUIPMENT
ADLS_ACUITY_SCORE: 21
DRESSING/BATHING_DIFFICULTY: YES
ADLS_ACUITY_SCORE: 19
ADLS_ACUITY_SCORE: 19
BATHING: 2-->COMPLETELY DEPENDENT (NOT DEVELOPMENTALLY APPROPRIATE)
ADLS_ACUITY_SCORE: 16
ADLS_ACUITY_SCORE: 21
TRANSFERRING: 2-->COMPLETELY DEPENDENT (NOT DEVELOPMENTALLY APPROPRIATE)
ADLS_ACUITY_SCORE: 13
ADLS_ACUITY_SCORE: 19
ADLS_ACUITY_SCORE: 21
EQUIPMENT_CURRENTLY_USED_AT_HOME: CANE, STRAIGHT
ADLS_ACUITY_SCORE: 21
DRESS: 1-->ASSISTANCE (EQUIPMENT/PERSON) NEEDED (NOT DEVELOPMENTALLY APPROPRIATE)
ADLS_ACUITY_SCORE: 21
FALL_HISTORY_WITHIN_LAST_SIX_MONTHS: YES
ADLS_ACUITY_SCORE: 21
CONCENTRATING,_REMEMBERING_OR_MAKING_DECISIONS_DIFFICULTY: NO
DEPENDENT_IADLS:: CLEANING;COOKING;LAUNDRY;SHOPPING;MEAL PREPARATION;MEDICATION MANAGEMENT;TRANSPORTATION
DOING_ERRANDS_INDEPENDENTLY_DIFFICULTY: YES
ADLS_ACUITY_SCORE: 21
ADLS_ACUITY_SCORE: 21
WALKING_OR_CLIMBING_STAIRS_DIFFICULTY: YES
ADLS_ACUITY_SCORE: 23
DRESS: 1-->ASSISTANCE (EQUIPMENT/PERSON) NEEDED
WALKING_OR_CLIMBING_STAIRS: AMBULATION DIFFICULTY, ASSISTANCE 1 PERSON
CHANGE_IN_FUNCTIONAL_STATUS_SINCE_ONSET_OF_CURRENT_ILLNESS/INJURY: YES
DIFFICULTY_EATING/SWALLOWING: NO
ADLS_ACUITY_SCORE: 21
ADLS_ACUITY_SCORE: 21
ADLS_ACUITY_SCORE: 19
ADLS_ACUITY_SCORE: 21

## 2022-04-05 NOTE — PLAN OF CARE
Orientation:  A&O to person  VS: WDL  Pain:  Denies  Activity:  Lift +2  Resp:   WDL  GI:  WDL  : Pure wick   Lines: NS infusing @ 100 mL/hr  Other:  Slept soundly throughout the night.   Plan:   Discharge TBD

## 2022-04-05 NOTE — ED NOTES
[facility]  ED Nurse Handoff Report    Olga Bailey is a 76 year old female   ED Chief complaint: Extremity Weakness  . ED Diagnosis:   Final diagnoses:   Pneumonia of left lung due to infectious organism, unspecified part of lung     Allergies:   Allergies   Allergen Reactions     Pilocarpine Headache and Nausea and Vomiting     Chlorhexidine Itching and Rash     Aripiprazole Headache and Other (See Comments)     Insomnia, nightmares     Bacitracin Rash     Bactroban is effective; No difficulties     Erythromycin      intolerant.     Meperidine Hcl      intolerant only   Demerol     Chloraprep One Step Rash       Code Status: Full Code  Activity level - Baseline/Home:  walker. Activity Level - Current:   Total Care. Lift room needed: No. Bariatric: No   Needed: No   Isolation: No. Infection: Not Applicable.     Vital Signs:   Vitals:    04/04/22 2315 04/04/22 2330 04/04/22 2345 04/05/22 0000   BP: 114/71 109/70 105/66 103/63   Pulse: 86 82 80 77   Resp:       Temp:       TempSrc:       SpO2: 95% 95% 96% 96%   Weight:           Cardiac Rhythm:  ,      Pain level:    Patient confused: Yes. Patient Falls Risk: Yes.   Elimination Status: has not voided at this time   Patient Report - Initial Complaint: Pt arrives via EMS from nursing home. Called for low oxygen 88% on room air at nursing home.   Upon arrival noted to have some right arm drift, change in smile per daughter.   Patient is alert and following commands. ABCs intact.   . Focused Assessment: Olga Bailey is a 76 year old female with a complex past medical history including glioblastoma multiforme, s/p resection 02/2021 and anticoagulated with Xarelto, who presents with right sided weakness, slurred speech, and leaning to the left. Per EMS, they were called due to 88% oxygen saturation and found the patient's oxygen saturation did not come above 90% even after an albuterol nebulizer in her home. They also noted right sided  "weakness with a right handed drift and the patient was sitting leaned quite far to the left. Patient's daughter states that she has been gradually declining, though today is significantly worse than normal. Last known well time is uncertain, but EMS states that it was sometime earlier today. When asked, the patient states that she has not been eating recently. She is otherwise unable to provide history except that the weakness began \"before today.\"       Review of Systems   Unable to perform ROS: Mental status change       Total care in bed  Patient is significantly weak  Flat affect.  Oriented to self and place  Hx of brain cancer and seizures  3Ls NC oxygen    Tests Performed:   MRA Neck (Carotids) wo & w Contrast   Final Result   IMPRESSION:   1.  No evidence of large vessel occlusion or high-grade stenosis.      MR Brain w/o & w Contrast   Final Result   IMPRESSION:   1.  No evidence of acute ischemia or hemorrhage.   2.  Stable postoperative change of left-sided craniotomy and tumor resection.   3.  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   4.  No change of dural based enhancing mass along the right occipital lobe, likely meningioma.   5.  Chronic/posttreatment changes as detailed.      MRA Brain ([place]) wo Contrast   Final Result   IMPRESSION:   1.  No evidence of large vessel occlusion or high-grade stenosis.   2.  Incidental 1 to 2 mm outpouchings off the bilateral internal carotid arteries at the expected origins of posterior communicating arteries, favored to represent incidental infundibula, unchanged.      XR Chest 2 Views   Final Result   IMPRESSION:    Shallow inspiration. Allowing for this there is the suggestion of a mild alveolar type infiltrate in the left midlung laterally. Direct auscultation may be beneficial to confirm.      Otherwise no acute abnormality or significant interval change. No discrete pleural " effusion or pneumothorax. Anterior cervical spinal fusion. Degenerative change involving the spine. Interval removal of the right PICC.        . Abnormal Results:   Labs Ordered and Resulted from Time of ED Arrival to Time of ED Departure   BASIC METABOLIC PANEL - Abnormal       Result Value    Sodium 139      Potassium 3.2 (*)     Chloride 103      Carbon Dioxide (CO2) 29      Anion Gap 7      Urea Nitrogen 22      Creatinine 1.42 (*)     Calcium 9.1      Glucose 216 (*)     GFR Estimate 38 (*)    ROUTINE UA WITH MICROSCOPIC REFLEX TO CULTURE - Abnormal    Color Urine Yellow      Appearance Urine Clear      Glucose Urine Negative      Bilirubin Urine Negative      Ketones Urine Negative      Specific Gravity Urine 1.017      Blood Urine Small (*)     pH Urine 5.5      Protein Albumin Urine 30  (*)     Urobilinogen Urine Normal      Nitrite Urine Negative      Leukocyte Esterase Urine Trace (*)     Mucus Urine Present (*)     Amorphous Crystals Urine Moderate (*)     RBC Urine 4 (*)     WBC Urine 2      Hyaline Casts Urine 12 (*)    CBC WITH PLATELETS AND DIFFERENTIAL - Abnormal    WBC Count 5.8      RBC Count 3.06 (*)     Hemoglobin 9.7 (*)     Hematocrit 31.1 (*)      (*)     MCH 31.7      MCHC 31.2 (*)     RDW 12.1      Platelet Count 171      % Neutrophils 78      % Lymphocytes 7      % Monocytes 12      % Eosinophils 2      % Basophils 0      % Immature Granulocytes 1      NRBCs per 100 WBC 0      Absolute Neutrophils 4.6      Absolute Lymphocytes 0.4 (*)     Absolute Monocytes 0.7      Absolute Eosinophils 0.1      Absolute Basophils 0.0      Absolute Immature Granulocytes 0.0      Absolute NRBCs 0.0     GLUCOSE BY METER - Abnormal    GLUCOSE BY METER POCT 197 (*)    PROCALCITONIN - Abnormal    Procalcitonin 0.26 (*)    TROPONIN I - Normal    Troponin I High Sensitivity 6     NT PROBNP INPATIENT - Normal    N terminal Pro BNP Inpatient 124     LACTIC ACID WHOLE BLOOD - Normal    Lactic Acid 0.9      INFLUENZA A/B & SARS-COV2 PCR MULTIPLEX - Normal    Influenza A PCR Negative      Influenza B PCR Negative      RSV PCR Negative      SARS CoV2 PCR Negative     BLOOD CULTURE   BLOOD CULTURE       Treatments provided: support  Family Comments: daughter is   OBS brochure/video discussed/provided to patient:  N/A  ED Medications:   Medications   gadobutrol (GADAVIST) injection 10 mL (10 mLs Intravenous Given 4/4/22 2036)   sodium chloride (PF) 0.9% PF flush 60 mL (60 mLs Intravenous Given 4/4/22 2037)     Drips infusing:  No  For the majority of the shift, the patient's behavior Green. Interventions performed were support and reassurance.    Sepsis treatment initiated: No     Patient tested for COVID 19 prior to admission: YES. negative    ED Nurse Name/Phone Number: Rena Vasquez RN,   12:10 AM  RECEIVING UNIT ED HANDOFF REVIEW    Above ED Nurse Handoff Report was reviewed: Yes  Reviewed by: Joon Woods RN on April 5, 2022 at 12:20 AM

## 2022-04-05 NOTE — ED NOTES
Ex  declined antibiotic stating that Dr. Crowley was planning to order something different.  notified. Med not given.
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
## 2022-04-05 NOTE — PROGRESS NOTES
CLINICAL NUTRITION SERVICES - ASSESSMENT NOTE     Nutrition Prescription      Malnutrition Status:    % Intake: Unable to assess--pt reports decreased intake, but could not quantify either time frame or how much less she was eating   % Weight Loss: > 5% in 1 month (severe)  Subcutaneous Fat Loss: Upper arm:  Moderate, orbital- moderate  Muscle Loss: Temporal:  Moderate, Thoracic region (clavicle, acromium bone, deltoid, trapezius, pectoral):  moderate, Upper arm (bicep, tricep):  Moderate to severe and Patellar region:  moderate  Fluid Accumulation/Edema: None noted  Malnutrition Diagnosis: Severe malnutrition in the context of chronic illness    Recommendations already ordered by Registered Dietitian (RD):  Medical food supplement therapy-Ensure Enlive bid   Multivitamin daily    Future/Additional Recommendations:  Monitor intake, weight trends, supplement acceptance     REASON FOR ASSESSMENT  Olga Bailey is a/an 76 year old female assessed by the dietitian for MST score of 2: unsure for weight loss.  Pt admitted d/t generalized weakness. PMH significant for glioblastoma multiforme of the left parietal region, underwent complete resection in March 2021, history of seizure disorder on antiseizure medication, history of HSV, pneumocystis pneumonia, depressive disorder, uncomplicated asthma      NUTRITION HISTORY  - Information obtained from chart and patient. Reviewed extensive RD notes from admission from April-July of 2021, first at acute hospital and then at TCU following her crani/resection of her glioblastoma. Pt reports her appetite has been decreasing for some time, but she could not specifically quantify the period of time.   - Supplements- received Magic Cup, Ensure Enlive--pt does not particularly like supplements but understands the rationale for them  - NKFA    CURRENT NUTRITION ORDERS  Diet: soft/bite-size (level 6) and mildly thick liquids  Intake/Tolerance: diet started around lunchtime, no  "information yet    LABS    Electrolytes  Sodium (mmol/L)   Date Value   04/05/2022 142   07/09/2021 139     Potassium (mmol/L)   Date Value   04/05/2022 3.8   07/09/2021 3.7     Magnesium (mg/dL)   Date Value   07/08/2021 1.8     Phosphorus (mg/dL)   Date Value   06/17/2021 3.6    Renal  Urea Nitrogen (mg/dL)   Date Value   04/05/2022 21   07/09/2021 10     Creatinine (mg/dL)   Date Value   04/05/2022 1.27 (H)   07/09/2021 0.55     Inflammatory Markers  CRP Inflammation (mg/L)   Date Value   12/03/2021 <2.9   04/28/2021 10.0 (H)   04/27/2021 13.0 (H)   04/22/2021 39.9 (H)     CRP (mg/dL)   Date Value   11/15/2021 0.2     WBC (10e9/L)   Date Value   07/09/2021 3.8 (L)     WBC Count (10e3/uL)   Date Value   04/05/2022 3.6 (L)     Albumin (g/dL)   Date Value   02/03/2022 3.0 (L)   06/22/2021 3.5      Blood Glucose  Glucose (mg/dL)   Date Value   04/05/2022 97   07/09/2021 92     Hemoglobin A1C POCT (%)   Date Value   02/18/2021 5.4    Hepatic  ALT (U/L)   Date Value   02/03/2022 <9   06/22/2021 36     AST (U/L)   Date Value   02/03/2022 12   06/22/2021 43     Alkaline Phosphatase (U/L)   Date Value   02/03/2022 90   06/22/2021 103     Bilirubin Total (mg/dL)   Date Value   02/03/2022 0.4   06/22/2021 0.3    Additional  Triglycerides (mg/dL)   Date Value   05/10/2021 191 (H)     Ketones Urine (mg/dL)   Date Value   04/04/2022 Negative   06/15/2021 Negative          MEDICATIONS    cefTRIAXone  1 g Intravenous Q24H     doxycycline (VIBRAMYCIN) IV  100 mg Intravenous Q12H     FLUoxetine  60 mg Oral Daily     levETIRAcetam  250 mg Oral BID     levETIRAcetam  1,000 mg Oral BID     sodium chloride (PF)  3 mL Intracatheter Q8H        - MEDICATION INSTRUCTIONS -       sodium chloride 100 mL/hr at 04/05/22 0112        ANTHROPOMETRICS  Height: 160 cm (5' 3\")  Most Recent Weight: 60.7 kg (133 lb 14.4 oz)    Body mass index is 23.72 kg/m .  BMI: Normal BMI  Weight History:   Wt Readings from Last 10 Encounters:   04/05/22 60.7 kg " (133 lb 14.4 oz)   02/24/22 67.1 kg (148 lb)   02/14/22 65.8 kg (145 lb)   12/21/21 65.8 kg (145 lb)   12/03/21 68.5 kg (151 lb)   11/16/21 68.4 kg (150 lb 12.8 oz)   10/12/21 68.2 kg (150 lb 6.4 oz)   10/12/21 68.2 kg (150 lb 6.4 oz)   09/14/21 68.9 kg (152 lb)   09/10/21 68.9 kg (152 lb)   07/01/21 : 67.5 kg (148 lb 12.8 oz)  06/19/21 : 68.9 kg (151 lb 12.8 oz)  06/09/21 : 67.2 kg (148 lb 3.2 oz)  06/05/21 : 72.6 kg (160 lb)-admit wt  06/05/21 : 73.7 kg (162 lb 7.7 oz)  06/03/21 : 68.1 kg (150 lb 1.6 oz)  06/01/21 : 68.6 kg (151 lb 4.8 oz)   05/29/21 : 70 kg (154 lb 6.4 oz)   05/19/21 : 69.9 kg (154 lb)  05/15/21 : 70.3 kg (155 lb)   05/10/21 : 67.1 kg (147 lb 14.9 oz)  04/26/21 : 74.4 kg (164 lb 0.4 oz)  04/01/21 : 68.1 kg (150 lb 2.1 oz)  03/23/21 : 70 kg (154 lb 6.4 oz)  03/08/21 : 69.9 kg (154 lb)    6.4 kg (9.5%) weight loss over about 1.5 months    Dosing Weight: 60.7 kg    ASSESSED NUTRITION NEEDS  Estimated Energy Needs: 8334-5691 kcals/day (25 - 30 kcals/kg)  Justification: Maintenance  Estimated Protein Needs: 73-91 grams protein/day (1.2 - 1.5 grams of pro/kg)  Justification: Repletion  Estimated Fluid Needs: Per provider pending fluid status    PHYSICAL FINDINGS  See malnutrition section below.    MALNUTRITION  As noted above    NUTRITION DIAGNOSIS  Inadequate oral intake related to acute on chronic illness as evidenced by decreased intake, weight loss, muscle and fat loss.      INTERVENTIONS  Implementation  Nutrition Education: Introduced self and role, discussed PO PTA and since admission, discussed supplements available. Encouraged adequate intake of food and fluids.     Medical food supplement therapy-as above  Multivitamin- as above    Goals  Patient to consume % of nutritionally adequate meal trays TID, or the equivalent with supplements/snacks.     Monitoring/Evaluation  Progress toward goals will be monitored and evaluated per protocol.  Johanna Jett RD, LD  Pager - 3rd floor/ICU:  [phone]  Pager - All other floors: [phone]  Pager - Weekend/holiday: [phone]  Office: [phone]

## 2022-04-05 NOTE — PHARMACY-ADMISSION MEDICATION HISTORY
Admission medication history interview status for this patient is complete. See [facility] admission navigator for allergy information, prior to admission medications and immunization status.     Medication history interview done, indicate source(s): Patient not interviewable  Medication history resources (including written lists, pill bottles, clinic record):MARs from [facility]  Pharmacy: [facility]    Changes made to PTA medication list:  Added: Hold parameters to amlodipine, Potassium chloride, myrbetriq,   Changed: methylphenidate, tylenol, olanzapine  Reported as Not Taking: None  Removed: bupropion, famotidine, melatonin, miralax    Actions taken by pharmacist (provider contacted, etc):None     Additional medication history information:None    Medication reconciliation/reorder completed by provider prior to medication history?  Y   (Y/N)       Prior to Admission medications    Medication Sig Last Dose Taking? Auth Provider   acetaminophen (TYLENOL) 500 MG tablet Take 500 mg by mouth every 4 hours as needed for mild pain   Yes Unknown, Entered By History   albuterol (PROAIR HFA/PROVENTIL HFA/VENTOLIN HFA) 108 (90 Base) MCG/ACT inhaler Inhale 2 puffs into the lungs every 4 hours as needed for wheezing  Yes Cole Egan MD   albuterol (PROVENTIL) (2.5 MG/3ML) 0.083% neb solution Take 1 vial (2.5 mg) by nebulization every 4 hours as needed for wheezing or shortness of breath / dyspnea  Yes Duke Bernabe MD   amLODIPine (NORVASC) 5 MG tablet Take 1 tablet (5 mg) by mouth daily  Patient taking differently: Take 5 mg by mouth daily Hold if SBP >160 or DBP <90 4/4/2022 at Unknown time Yes Oliver Cheng MD   busPIRone HCl (BUSPAR) 30 MG tablet Take 30 mg by mouth 2 times daily 4/4/2022 at Unknown time Yes Unknown, Entered By History   calcium polycarbophil (FIBERCON) 625 MG tablet Take 1 tablet (625 mg) by mouth daily 4/4/2022 at Unknown time Yes Oliver Cheng MD   FLUoxetine (PROZAC) 20 MG capsule Take 3 capsules (60 mg)  by mouth daily 4/4/2022 at Unknown time Yes Marvel Mcdowell MD   furosemide (LASIX) 40 MG tablet Take 1 tablet (40 mg) by mouth daily 4/4/2022 at Unknown time Yes Marvel Mcdowell MD   ketoconazole (NIZORAL) 2 % external shampoo Apply topically twice a week On Wed & Sun 4/3/2022 Yes Unknown, Entered By History   lactulose (CHRONULAC) 10 GM/15ML solution Take 20 g by mouth daily as needed for constipation  Yes Unknown, Entered By History   levETIRAcetam (KEPPRA) 1000 MG tablet Take 1,000 mg by mouth 2 times daily Give with 250mg tab = 1250mg total dose twice daily 4/4/2022 at Unknown time Yes Unknown, Entered By History   levETIRAcetam (KEPPRA) 250 MG tablet Take 250 mg by mouth 2 times daily Give with 100mg tab = 1250mg 4/4/2022 at Unknown time Yes Unknown, Entered By History   linaclotide (LINZESS) 145 MCG capsule Take by mouth daily as needed As needed for IBS/constipation  Yes Unknown, Entered By History   loperamide (IMODIUM) 2 MG capsule TAKE 2 CAPSULES (4MG) BY MOUTH AFTER FIRST LOOSE STOOL, THEN;TAKE 1 CAPSULE AFTER CONSECUTIVE STOOLS, MAX 4 CAPS/24H  Yes Reported, Patient   methylphenidate (RITALIN) 5 MG tablet Start taking 1/2 tablet daily in the morning for 7 days, then increase to 1 full tablet daily.  Patient taking differently: Take 5 mg by mouth daily Start taking 1/2 tablet daily in the morning for 7 days, then increase to 1 full tablet daily. 4/4/2022 at Unknown time Yes Maggi Plaza DO   mirabegron (MYRBETRIQ) 25 MG 24 hr tablet Take 25 mg by mouth daily 4/4/2022 at Unknown time Yes Unknown, Entered By History   mirtazapine (REMERON) 15 MG tablet Take 15 mg by mouth At Bedtime 4/3/2022 at Unknown time Yes Unknown, Entered By History   OLANZapine (ZYPREXA) 5 MG tablet Take 1 tablet every night at bedtime. Can take and additional 1/2-1 full tablet during the day as needed for anxiety/agitated. 4/3/2022 at Unknown time Yes Maggi Plaza DO   ondansetron (ZOFRAN) 4  MG tablet Take 1 tablet (4 mg) by mouth every 8 hours as needed for nausea  Yes Stephanie Baker MD   pantoprazole (PROTONIX) 40 MG EC tablet Take 1 tablet (40 mg) by mouth 2 times daily (before meals) 4/4/2022 at Unknown time Yes Marvel Mcdowell MD   potassium chloride (KAYCIEL) 20 MEQ/15ML (10%) solution Take 20 mEq by mouth daily Mix with 360ml water 4/4/2022 at Unknown time Yes Unknown, Entered By History   QUEtiapine (SEROQUEL) 50 MG tablet TAKE 1 TAB BY MOUTH AT BEDTIME 4/3/2022 at Unknown time Yes Reported, Patient   rivaroxaban ANTICOAGULANT (XARELTO ANTICOAGULANT) 20 MG TABS tablet Take 1 tablet (20 mg) by mouth daily (with dinner) 4/4/2022 at Unknown time Yes Rosenda Gamboa MD   traZODone (DESYREL) 50 MG tablet Take 25 mg by mouth nightly as needed for sleep  Yes Unknown, Entered By History   Nutritional Supplements (ENSURE CLEAR) LIQD TAKE ONE CAN BY MOUTH TWICE DAILY IN BETWEEN MEALS FOR WEIGHT LOSS   Reported, Patient

## 2022-04-05 NOTE — PLAN OF CARE
Goal Outcome Evaluation:    Plan of Care Reviewed With: patient     This writer assumed care at 11:00. Pt alert to self and place. No c/o of pain. Pt cont to be weak. Speech did bed side swallow eval, diet changed to soft small bites (level 6) mildly thick liquids(level 2). Speech stated patient to weak to feed self. Pt is assist of 2 to turn skin assessed, no breakdown. Pt does have knee hi edwardo hose on this writer did not take off and assess. Pt cont on antibiotics. Temp 99.2 today, 02 99% 4L NC. LS clear. Cont POC      Weaned patient to 2L 96% and RA 90%. Cont oxygen at 2L. Pt did not want multivitamin at this time, wants to sleep.

## 2022-04-05 NOTE — CONSULTS
Care Management Initial Consult    General Information  Assessment completed with: Children, Patient, La Nena  Type of CM/SW Visit: Initial Assessment    Primary Care Provider verified and updated as needed: Yes   Readmission within the last 30 days:        Reason for Consult: care coordination/care conference, discharge planning          Communication Assessment  Patient's communication style: spoken language (English or Bilingual)    Hearing Difficulty or Deaf: no   Wear Glasses or Blind: yes    Cognitive  Cognitive/Neuro/Behavioral: .WDL except, orientation  Level of Consciousness: alert  Arousal Level: opens eyes spontaneously  Orientation: disoriented to, place, time, situation  Mood/Behavior: flat affect  Best Language: 0 - No aphasia  Speech: clear, whispers    Living Environment:      Current living Arrangements: assisted living  [facility] of Facility: [facility]   Able to return to prior arrangements: other (see comments) (TBD)       Family/Social Support:  Care provided by: other (see comments) (care home Staff)  Provides care for: no one, unable/limited ability to care for self     Children          Description of Support System: Supportive, Involved    Support Assessment: Patient communicates needs well met, Adequate family and caregiver support    Current Resources:   Patient receiving home care services: No     Community Resources: None  Equipment currently used at home: walker, standard, wheelchair, manual  Supplies currently used at home: None    Employment/Financial:  Employment Status: retired        Financial Concerns: No concerns identified           Lifestyle & Psychosocial Needs:  Social Determinants of Health     Tobacco Use: Low Risk      Smoking Tobacco Use: Never Smoker     Smokeless Tobacco Use: Never Used   Alcohol Use: Not on file   Financial Resource Strain: Not on file   Food Insecurity: Not on file   Transportation Needs: Not on file   Physical Activity: Not on file   Stress: Not on file    Social Connections: Not on file   Intimate Partner Violence: Not on file   Depression: Not on file   Housing Stability: Not on file       Functional Status:  Prior to admission patient needed assistance:   Dependent ADLs:: Ambulation-walker, Bathing, Dressing, Grooming, Transfers, Wheelchair-with assist, Toileting  Dependent IADLs:: Cleaning, Cooking, Laundry, Shopping, Meal Preparation, Medication Management, Transportation       Mental Health Status:  Mental Health Status: No Current Concerns       Chemical Dependency Status:  Chemical Dependency Status: No Current Concerns             Values/Beliefs:  Spiritual, Cultural Beliefs, Taoist Practices, Values that affect care: no       Cultural/Taoist Practices Patient Routinely Participates In: other (see comments) (Faith)       Care Management Follow Up    Length of Stay (days): 1    Expected Discharge Date: 04/07/2022     Concerns to be Addressed: care coordination/care conferences, discharge planning     Patient plan of care discussed at interdisciplinary rounds: Yes    Anticipated Discharge Disposition: Assisted Living     Anticipated Discharge Services: None  Anticipated Discharge DME: None    Patient/family educated on Medicare website which has current facility and service quality ratings: no  Education Provided on the Discharge Plan:  Yes  Patient/Family in Agreement with the Plan: yes    Referrals Placed by CM/SW: External Care Coordination  Private pay costs discussed: Not applicable    Additional Information:  Care management following for discharge planning and identified as high readmission risk.  Spoke briefly with patient at bedside. Called and completed assessment with daughter La Nena.   [facility] lives [facility]. Verbal permission was received to speak to the facility to verify services and to discuss the discharge plan of care. A call was placed to [facility] and services were verified.     Patient  receives services as follows: [facility] (able to provide support with teresa/ EZ stand as needed)  Verified that patient is receiving home care PT/OT through [facility] P: [phone] Fax [phone]    [facility] contact (name/number): [phone] Yudy nurse [phone]  Fax #: [phone]  Barriers to pt returning: Medical readiness and goals of care  Baseline mobility: Declining prior to admission, Uses walker for mobility, needing assistance with transferring into wheelchair  New meds/prescriptions (fill here?): No  Pharmacy: [facility]    Other: Patient lives at [email or web address] since Fall [facility]. [facility] manages medications.   Patient follows with [facility] Dr. Gamboa and [facility] at [facility].     RN CC/SW will continue to follow for discharge planning and return to facility.      Jailyn Shine, RN      Jailyn Shine RN Case Manager  Inpatient Care Coordination  [facility]   [phone]

## 2022-04-05 NOTE — CONSULTS
[facility]    Stroke Telephone Note    I was called by Jocelyn Dietz on 04/04/22 regarding patient Olga Bailey. The patient is a 76 year old female with hx of GBM (IDH wildtype, p53/PTEN mutation, MGMT+) - Diagnosed March 2021 s/p left craniotomy for resection/biopsy presenting with generalized weakness. Perhaps more weakness on the right-side. LSN is unknown. Clinical concern for seizures/encephlopathy. Less likely to be stroke given known left-side lesion.     Imaging Findings   MR Brain w/o & w Contrast    Impression    IMPRESSION:  1.  No evidence of acute ischemia or hemorrhage.  2.  Stable postoperative change of left-sided craniotomy and tumor resection.  3.  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  4.  No change of dural based enhancing mass along the right occipital lobe, likely meningioma.  5.  Chronic/posttreatment changes as detailed.   MRA Brain ([place]) wo Contrast    Impression    IMPRESSION:  1.  No evidence of large vessel occlusion or high-grade stenosis.  2.  Incidental 1 to 2 mm outpouchings off the bilateral internal carotid arteries at the expected origins of posterior communicating arteries, favored to represent incidental infundibula, unchanged.   MRA Neck (Carotids) wo & w Contrast    Impression    IMPRESSION:  1.  No evidence of large vessel occlusion or high-grade stenosis.     Impression  This is a 76-year-old female with known history of glioblastoma multiforme of the left parietal region status post craniotomy for resection and radiation and chemotherapy and known seizure disorder now presenting with encephalopathy and right-sided weakness.  Patient symptoms are concerning for seizures secondary to known cortical lesion.  MRI/MRA was performed MRI is negative for acute ischemic stroke, MRA does not show any critical stenosis.  Recommend admission for a general  "neurology consult and  Seizure management.    Recommendations   -MRI brain is negative for acute ischemic stroke  -General neurology consult for seizure management  -Please page stroke service if you have any further questions  -We will sign off at this time    My recommendations are based on the information provided over the phone by Olga Bailey's in-person providers. They are not intended to replace the clinical judgment of her in-person providers. I was not requested to personally see or examine the patient at this time.    The Stroke Staff is Dr. Nava.    Dionicio Jacobs MD  Vascular Neurology Fellow  To page me or covering stroke neurology team member, click here: [facility]   Choose \"On Call\" tab at top, then search dropdown box for \"Neurology Adult\", select location, press Enter, then look for stroke/neuro ICU/telestroke.        "

## 2022-04-05 NOTE — PROGRESS NOTES
[facility]    Hospitalist Progress Note  Name: Olga Bailey    MRN: [id number]  Provider:  Magnus Arevalo DO MPH  Date of Service: 04/05/2022    Summary of Stay: Olga Bailey is a 76 year old female patient with past medical history of glioblastoma multiforme of the left parietal region who underwent craniotomy and complete resection in March 2021 as well as radiation and chemotherapy, PJP pneumonia, TRALI, seizure disorder on Keppra, depressive disorder, uncomplicated asthma, who is currently residing in nursing home was brought to emergency room for evaluation for generalized weakness.      Patient states that she has generalized weakness which has been gradually getting worse.  She had some right-sided weakness.  She also states that her weakness also shifted to the left side.  She had no cough or shortness of breath.  She also denies subjective fever, vomiting, diarrhea, urinary symptoms.  She had significantly decreased oral intake.  To note, patient is followed by [facility] and is currently    EMS was called and she was noted to be hypoxic with oxygen saturation 88%.  She was given neb treatment.  She was brought to emergency room for evaluation.  On arrival to emergency room, her vital signs showed  temperature 99.9, pulse 94, blood pressure 113/74, oxygen saturation 97% on 3 L.  Laboratory work-up showed sodium 139, potassium 3.2, creatinine 1.42, , lactic acid 0.9, WBC 5.8, hemoglobin 9.7.  Procalcitonin 0.26.  Chest x-ray showed mild alveolar infiltrate in the left midlung.  MRI of the brain showed no evidence of acute ischemia or hemorrhage.  Stable postoperative change in the left side craniotomy and tumor resection.  MRA of the brain/neck negative for high-grade stenosis.    She was admitted to the hospital for further management including IV fluids and IV antibiotics for pneumonia.  Neurology was contacted by phone but given unremarkable MRI/MRA is not  thought to represent a stroke.  Antibiotics were transitioned to ceftriaxone/doxycycline and she was started on IV fluids and admitted to the hospital.     Problem list:  1.  Acute hypoxic respiratory failure likely secondary to community-acquired pneumonia: Patient complains of generalized weakness and decreased intake.  She was noted to be hypoxic and mildly febrile.  Procalcitonin is slightly elevated but normal white cell count and lactic acid on admission.  Plan on admission was for IV Levaquin because of history of allergies to macrolides.  This was changed to IV ceftriaxone and doxycycline given her history of seizures and that will be continued.  We will continue her on normal saline at 100 mL/h.     2.  Hypokalemia: Potassium 3.2.  We will put her on potassium replacement protocol     3.  History of glioblastoma multiforme in the left parietal region status post complete resection in March 2021: Follows with [facility].  Is on radiation 5/2021.  Started adjuvant therapy with temozolomide which was complicated by hematologic toxicity so changed to metronomic/daily dosing and 7/2021 which was tolerated and showed positive treatment effects.  She has completed 6 months of adjuvant temozolomide as of 12/2021.  Follow-up imaging 2/2022 without concern and plan to remain off treatment and on imaging surveillance moving forward.     4.  History of seizure disorder: On Keppra, resumed.     5.  Chronic kidney disease: Baseline creatinine around 1.3-1.45.  Creatinine 1.27 today.  Started on IV fluids for decreased fluid intake. Will monitor renal function daily and continue IV fluids.       6.  Depression, deconditioning, poor functional status: We will resume her psychiatric medications which I believe include BuSpar, fluoxetine, Ritalin, olanzapine, Seroquel.  She is followed by PMR and palliative care as an outpatient.  She is noticed to have decline in her functional status due to poorly controlled  depression and lack of inspiration/motivation with chronic fatigue and neurological deficits.  Will request PT/OT consultation.  Could consider palliative care involvement during the hospitalization.    7.  History of bilateral DVT and retroperitoneal hemorrhage while on anticoagulation now with IVC filter: Found to have bilateral DVT during hospitalization last year and started on enoxaparin which was complicated by right-sided retroperitoneal hemorrhage requiring transfusion support.  She is now on anticoagulation with Xarelto.    8.  Acute on chronic anemia, pancytopenia: Hemoglobin down to 8.0 this morning from 9.7 last night.  Baseline hemoglobin appears to be about 9.5-10.5.  No reports of bleeding per nursing.  We will recheck hemoglobin at 1400.  Hold Xarelto for now until hemoglobin is stabilized.  May need to consider repeat CT abdomen/pelvis to look for retroperitoneal hematoma but I am hesitant to do this if hemoglobin stabilizes, particularly considering her chronic kidney disease.  Platelets are only mildly low and likely due to hemodilution and infection.  She also has mild leukopenia/lymphopenia.    9.  Dysphagia: Daughter indicates she has some difficulty swallowing and is on a restricted diet at baseline.  We will place n.p.o. for now and request SLP evaluation.    DVT Prophylaxis: Pneumatic Compression Devices  Code Status: Full Code  Diet: NPO for Medical/Clinical Reasons Except for: Meds, Ice Chips    Martino Catheter: Not present  Disposition: Expected discharge in 2-3 days to home vs TCU. Goals prior to discharge include manage infection and work up as outlined above.   Incidental Findings: As above.  Family updated today: Yes daughter by phone. 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     Interval History   Assumed care from previous hospitalist. The history was fully reviewed.  She remains fatigued and weak. No pain. No chest pain or shortness of breath. No nausea, vomiting, diarrhea, constipation. No fevers.  No other specific complaints identified.     -Data reviewed today: I personally reviewed all new labs and imaging results over the last 24 hours.     Physical Exam   Temp: 98.6  F (37  C) Temp src: Oral BP: 99/59 Pulse: 74   Resp: 20 SpO2: 96 % O2 Device: Nasal cannula Oxygen Delivery: 4 LPM  Vitals:    04/04/22 1931 04/05/22 0100   Weight: 68 kg (150 lb) 60.7 kg (133 lb 14.4 oz)     Vital Signs with Ranges  Temp:  [98.2  F (36.8  C)-99.9  F (37.7  C)] 98.6  F (37  C)  Pulse:  [70-99] 74  Resp:  [18-20] 20  BP: ()/(59-79) 99/59  SpO2:  [87 %-99 %] 96 %  No intake/output data recorded.    GENERAL: No apparent distress. Awake, alert, and mostly oriented but lethargic.  HEENT: Normocephalic, atraumatic. Extraocular movements intact.  CARDIOVASCULAR: Regular rate and rhythm without murmurs or rubs. No S3.  PULMONARY: Clear bilaterally.  GASTROINTESTINAL: Soft, non-tender, non-distended. Bowel sounds normoactive.   EXTREMITIES: No cyanosis or clubbing. Trace edema.  NEUROLOGICAL: CN 2-12 grossly intact, no focal neurological deficits.  DERMATOLOGICAL: No rash, ulcer, bruising, nor jaundice.     Medications     - MEDICATION INSTRUCTIONS -       sodium chloride 100 mL/hr at 04/05/22 0112       cefTRIAXone  1 g Intravenous Q24H     doxycycline (VIBRAMYCIN) IV  100 mg Intravenous Q12H     FLUoxetine  60 mg Oral Daily     levETIRAcetam  1,000 mg Oral BID     levETIRAcetam  250 mg Oral BID     sodium chloride (PF)  3 mL Intracatheter Q8H     Data     Laboratory:  Recent Labs   Lab 04/05/22 0556 04/04/22 1941   WBC 3.6* 5.8   HGB 8.0* 9.7*   HCT 25.8* 31.1*   * 102*   * 171     Recent Labs   Lab 04/05/22  0556 04/05/22  0116 04/04/22 1941 04/04/22 1935     --  139  --    POTASSIUM 3.3* 3.1* 3.2*  --    CHLORIDE 108  --  103  --    CO2 30  --  29  --    ANIONGAP 4  --  7  --    GLC 97  --  216* 197*   BUN 21  --  22  --    CR 1.27*  --  1.42*  --    GFRESTIMATED 44*  --  38*  --    ESTIVEN 8.6  --  9.1   --      No results for input(s): CULT in the last 168 hours.    Imaging:  Recent Results (from the past 24 hour(s))   XR Chest 2 Views    Narrative    EXAM: XR CHEST 2 VW  LOCATION: [facility]  DATE/TIME: 4/4/2022 8:34 PM    INDICATION: Breast, hypoxia  COMPARISON: 5/2/2021.      Impression    IMPRESSION:   Shallow inspiration. Allowing for this there is the suggestion of a mild alveolar type infiltrate in the left midlung laterally. Direct auscultation may be beneficial to confirm.    Otherwise no acute abnormality or significant interval change. No discrete pleural effusion or pneumothorax. Anterior cervical spinal fusion. Degenerative change involving the spine. Interval removal of the right PICC.   MRA Brain ([place] [facility]) wo Contrast    Narrative    EXAM: MRA BRAIN ([place] [facility]) WO CONTRAST  LOCATION: [facility]  DATE/TIME: 4/4/2022 8:26 PM    INDICATION: Stroke, follow up.  COMPARISON: MRA 04/14/2022.  TECHNIQUE: 3D time-of-flight head MRA without intravenous contrast.    FINDINGS:  ANTERIOR CIRCULATION: No stenosis/occlusion, aneurysm, or high flow vascular malformation. Incidental 1 to 2 mm outpouchings at the origins of the bilateral posterior communicating arteries, probably incidental infundibulum, unchanged. No convincing   evidence of aneurysm.    POSTERIOR CIRCULATION: No stenosis/occlusion, aneurysm, or high flow vascular malformation. Right vertebral artery is dominant.      Impression    IMPRESSION:  1.  No evidence of large vessel occlusion or high-grade stenosis.  2.  Incidental 1 to 2 mm outpouchings off the bilateral internal carotid arteries at the expected origins of posterior communicating arteries, favored to represent incidental infundibula, unchanged.   MR Brain w/o & w Contrast    Narrative    EXAM: MR BRAIN W/O and W CONTRAST  LOCATION: [facility]  DATE/TIME: 4/4/2022 8:27 PM    INDICATION: Right-sided  weakness, brain cancer, seizure.  COMPARISON: Head MRI 02/14/2022.  CONTRAST: 10 mL Gadavist.  TECHNIQUE: Routine multiplanar multisequence head MRI without and with intravenous contrast.    FINDINGS:  No evidence of acute ischemia or hemorrhage.    Postoperative change of left-sided craniotomy with underlying gliosis/resection cavity involving the left inferior parietal lobule with areas of nonspecific enhancement/intrinsic T1 hyperintense signal along the resection cavity and hemosiderin   deposition, similar compared to the prior exam.    Volume loss is present with confluent white matter T2 hyperintensities throughout the cerebral hemispheres and ashwin which likely represent a combination of advanced chronic small vessel ischemic change and posttreatment change.    Subcentimeter nodule within the white matter along the frontal horn of the right lateral ventricle without corresponding enhancement or diffusion restriction, unchanged.    Dural based mass along the right occipital lobe measuring up to 12 mm in oblique anterior to posterior dimension, unchanged.    Marrow signal is within normal limits. Bilateral temporomandibular joint degenerative change. Visualized paranasal sinuses, tympanic cavities, and mastoid cavities are unremarkable.      Impression    IMPRESSION:  1.  No evidence of acute ischemia or hemorrhage.  2.  Stable postoperative change of left-sided craniotomy and tumor resection.  3.  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  4.  No change of dural based enhancing mass along the right occipital lobe, likely meningioma.  5.  Chronic/posttreatment changes as detailed.   MRA Neck (Carotids) wo & w Contrast    Narrative    EXAM: MRA NECK (CAROTIDS) WO and W CONTRAST  LOCATION: [facility]  DATE/TIME: 4/4/2022 8:27 PM    INDICATION: Right-sided weakness.  COMPARISON: MRA of the neck 02/14/2022.  CONTRAST: 10 mL  Gadavist.   TECHNIQUE: Neck MRA without and with IV contrast. Stenosis measurements made according to NASCET criteria unless otherwise specified.    FINDINGS:  RIGHT CAROTID: No measurable stenosis or dissection. Incidental right internal carotid artery tonsillar loop.    LEFT CAROTID: No measurable stenosis or dissection. Incidental left internal carotid artery tonsillar loop.    VERTEBRAL ARTERIES: No focal stenosis or dissection. Right vertebral artery is dominant.    AORTIC ARCH: Classic aortic arch anatomy with no significant stenosis at the origin of the great vessels.    Cervical spine postoperative change.      Impression    IMPRESSION:  1.  No evidence of large vessel occlusion or high-grade stenosis.         Magnus Arevalo DO MPH  [facility] Hospitalist  [address] [place] [address]  04/05/2022

## 2022-04-05 NOTE — ED TRIAGE NOTES
Pt arrives via EMS from nursing home. Called for low oxygen 88% on room air at nursing home.   Upon arrival noted to have some right arm drift, change in smile per daughter.   Patient is alert and following commands. ABCs intact.

## 2022-04-05 NOTE — PLAN OF CARE
Temp: 98.6  F (37  C) Temp src: Oral BP: 122/52 Pulse: 89   Resp: 18 SpO2: 96 % O2 Device: Nasal cannula Oxygen Delivery: 1 LPM    Patient oriented to place and person. No complaints of pain. Patient having increased weakness, stroke ruled out and neuro-stroke signed off. Patient receiving IVF along with IV abx. Patient has some blanchable redness on sacrum, mepilex applied. Turning Q2 hours unless refusing. Patient had goo appetite for dinner. Patient's daughter La Nena (p: [phone]) would like to be called with updates in the morning as well as called by the MD after rounding. Patient resting in bed. On 2 L NC. Bed alarm on. Will continue to monitor.     Celeste BAI Will on 4/5/2022 at 10:17 PM      Patient temp 102.2 F. PO tylenol give, sepsis triggered. Lactic and q30 min orders put in and admitting MD paged ANABELA.     Celeste BAI Will on 4/5/2022 at 10:47 PM

## 2022-04-05 NOTE — PROGRESS NOTES
Clinical Swallow Evaluation:    Recommendations:   1) soft/bite sized solids, mildly thick liquids (straws OK) when fully alert/upright, periodic liqiud wash.   2) Currently 1:1 assist indicated given weakness, difficulty self-feeding though encourage self-feeding if pt is able to.  3) medications whole with mildly thick liquids or puree solids if needed        04/05/22 1226   General Information   Onset of Illness/Injury or Date of Surgery 04/04/22   Referring Physician Dr. Arevalo   Patient/Family Therapy Goal Statement (SLP) Did not state   Pertinent History of Current Problem   Olga Bailey is a 76 year old female patient with past medical history of glioblastoma multiforme of the left parietal region who underwent craniotomy and complete resection in March 2021 as well as radiation and chemotherapy, PJP pneumonia, TRALI, seizure disorder on Keppra, depressive disorder, uncomplicated asthma, who is currently residing in nursing home was brought to emergency room for evaluation for generalized weakness. Admits with acute hypoxic respiratory failure likely 2/2 PNA. SLP consulted for dysphagia assessment given PMHx dysphagia.      General Observations Pt alert, slow responses at times, weakness with some difficulty with cup drinking/self-feeding today   Past History of Dysphagia   PMHx oropharyngeal dysphagia s/p glioblastoma resection in 3/2021. VFSS's: 4/21/21, 5/21/21, 6/11/21. First x1 VFSS's showed silent aspiration with recommendations for soft/moist solids and mildly thick liquids. Improvements on last VFSS s/p SLP intervention, flash penetration but no aspiration. Again recommend. Pt was able to upgrade to regular/thin diet by time of discharge from ARU. Unsure of what baseline diet is at pt's facility, not mentioned in paperwork sent over.     Pain Assessment   Patient Currently in Pain No   Type of Evaluation   Type of Evaluation Swallow Evaluation   Oral Motor   Oral Musculature anomalies present    Structural Abnormalities none present   Mucosal Quality good   Dentition (Oral Motor)   Dentition (Oral Motor) natural dentition;adequate dentition   Facial Symmetry (Oral Motor)   Facial Symmetry (Oral Motor) right side impairment   Right Side Facial Asymmetry minimal impairment;moderate impairment   Lip Function (Oral Motor)   Lip Range of Motion (Oral Motor) protrusion impairment;retraction impairment   Protrusion, Lip Range of Motion right side;minimal impairment   Retraction, Lip Range of Motion right side;minimal impairment   Tongue Function (Oral Motor)   Tongue ROM (Oral Motor) lateralization is impaired  (bilateral, mild)   Jaw Function (Oral Motor)   Jaw Function (Oral Motor) WNL   Cough/Swallow/Gag Reflex (Oral Motor)   Volitional Throat Clear/Cough (Oral Motor) WNL   Volitional Swallow (Oral Motor) WNL   Vocal Quality/Secretion Management (Oral Motor)   Vocal Quality (Oral Motor) WNL   Secretion Management (Oral Motor) WNL   General Swallowing Observations   Current Diet/Method of Nutritional Intake (General Swallowing Observations, NIS) NPO   Swallowing Evaluation Clinical swallow evaluation   Clinical Swallow Evaluation   Feeding Assistance frequent cues/help required   Clinical Swallow Evaluation Textures Trialed thin liquids;mildly thick liquids;pureed;soft & bite-sized;solid foods   Clinical Swallow Eval: Thin Liquid Texture Trial   Mode of Presentation, Thin Liquids spoon;cup;straw   Volume of Liquid or Food Presented < 2 oz   Oral Phase of Swallow premature pharyngeal entry   Pharyngeal Phase of Swallow reduction in laryngeal movement;throat clearing;wet vocal quality after swallow   Diagnostic Statement throat clearing on 2/3 ice chips, wet vocal quality x2 with cup/straw sips.   Clinical Swallow Eval: Mildly Thick Liquids   Mode of Presentation spoon;straw   Volume Presented 3.5 oz, x2 sips with medications whole with RN present   Oral Phase WFL   Pharyngeal Phase intact   Diagnostic Statement  no overt signs/sx aspiration noted   Clinical Swallow Evaluation: Puree Solid Texture Trial   Mode of Presentation, Puree spoon   Volume of Puree Presented x4 bites in isolation, x3 bites with medications whole with RN present   Oral Phase, Puree WFL   Pharyngeal Phase, Puree reduction in laryngeal movement   Diagnostic Statement no overt signs/sx aspiration noted   Clinical Swallow Eval: Soft & Bite Sized   Mode of Presentation spoon   Volume Presented x4 bites   Oral Phase WFL   Pharyngeal Phase reduction in laryngeal movement   Diagnostic Statement no overt signs/sx aspiration noted   Clinical Swallow Evaluation: Solid Food Texture Trial   Mode of Presentation self-fed   Volume Presented x3 bites   Oral Phase   (prolonged oral phase)   Pharyngeal Phase reduction in laryngeal movement   Diagnostic Statement no overt signs/sx aspiration noted   Swallowing Recommendations   Diet Consistency Recommendations soft & bite-sized (level 6);mildly thick liquids (level 2)   Supervision Level for Intake 1:1 supervision needed   Mode of Delivery Recommendations slow rate of intake   Swallowing Maneuver Recommendations alternate food and liquid intake   Monitoring/Assistance Required (Eating/Swallowing) check mouth frequently for oral residue/pocketing;cue for finger/lingual sweep if oral pocketing present;stop eating activities when fatigue is present;monitor for cough or change in vocal quality with intake   Recommended Feeding/Eating Techniques (Swallow Eval) maintain upright sitting position for eating;maintain upright posture during/after eating for 30 minutes   Medication Administration Recommendations, Swallowing (SLP) whole with mildly thick liqiuds or puree if needed   Instrumental Assessment Recommendations   (monitor need for updated VFSS )   General Therapy Interventions   Planned Therapy Interventions Dysphagia Treatment   Clinical Impression   Criteria for Skilled Therapeutic Interventions Met (SLP Eval) Yes,  treatment indicated   SLP Diagnosis mild oral and supsected pharyngeal dysphagia   Clinical Impression Comments   Pt presents with mild oral and supsected pharyngeal dysphagia on clinical swallow evaluation, suspect 2/2 weakness with likely chronic dysphagia component given PMHx. Oromotor evaluation revealed mild-moderate R sided oromotor weakness/asymmetry. Despite same, pt with functional labial seal around tsp, cup and straw with no anterior bolus loss. Mastication and oral transit time prolonged with regular solids - improved with puree/ moist semi-solids. Suspect premature bolus spillage with ?pharyngeal swallow delay and  potential laryngeal penetration with thin liquids given the following signs/sx: throat clearing on 2/3 ice chips, wet vocal quality x2 with cup/straw sips. Swallow appears more timely with mildly thick liquids and solids, where no overt clinical signs/sx aspiration were oberved.      SLP Discharge Planning   SLP Discharge Recommendation   (baseline facility with SLP follow up)   SLP Rationale for DC Rec Pt below last known baseline of regular/thin diet   SLP Brief overview of current status    Recommendations: soft/bite sized solids, mildly thick liquids (straws OK) when fully alert/upright, periodic liqiud wash. Currently 1:1 assist indicated given weakness, difficulty self-feeding though encourage self-feeding if pt is able to.       Total Evaluation Time   Total Evaluation Time (Minutes) 18   SLP Goals   Therapy Frequency (SLP Eval) 5 times/wk   SLP Predicated Duration/Target Date for Goal Attainment 04/12/22   SLP Goals Swallow   SLP: Safely tolerate diet without signs/symptoms of aspiration Regular diet;Thin liquids;With use of swallow precautions;Independently

## 2022-04-05 NOTE — ED PROVIDER NOTES
"  History   Chief Complaint:  Extremity Weakness       HPI   Olga Bailey is a 76 year old female with a complex past medical history including glioblastoma multiforme, s/p resection 02/2021 and anticoagulated with Xarelto, who presents with right sided weakness, slurred speech, and leaning to the left. Per EMS, they were called due to 88% oxygen saturation and found the patient's oxygen saturation did not come above 90% even after an albuterol nebulizer in her home. They also noted right sided weakness with a right handed drift and the patient was sitting leaned quite far to the left. Patient's daughter states that she has been gradually declining, though today is significantly worse than normal. Last known well time is uncertain, but EMS states that it was sometime earlier today. When asked, the patient states that she has not been eating recently. She is otherwise unable to provide history except that the weakness began \"before today.\"      Review of Systems   Unable to perform ROS: Mental status change       Allergies:  Pilocarpine  Chlorhexidine  Aripiprazole  Bacitracin  Erythromycin  Meperidine Hcl  Chloraprep One Step    Medications:  Albuterol inhaler  Amlodipine  Wellbutrin XL  Buspirone  Pepcid  Fluoxetine  Lasix  Keppra  Imodium  Ritalin  Olanzapine  Protonix  Miralax  Seroquel  Xarelto    Past Medical History:    Carcinoma in situ of skin  Depression  Dysthymic disorder  Glioblastoma multiforme  Asthma  Pyelonephritis  Seizures  HSV, primary of mouth  Pneumocystis pneumonia  Acute respiratory failure with hypoxia  Anxiety     Past Surgical History:    Bunionectomy, bilateral  Hysterectomy  Craniotomy + excise tumor  Rotator cuff repair, left  Ovarian mass removal, right (benign)  L4-5 discectomy     Family History:    Cancer - father  Hypertension - mother    Social History:  The patient was brought to the emergency department by EMS.  The patient lives in a nursing home.  PCP: Enrique Puga "     Physical Exam     Patient Vitals for the past 24 hrs:   BP Temp Temp src Pulse Resp SpO2 Weight   04/04/22 2145 119/75 99.5  F (37.5  C) Oral 87 -- 96 % --   04/04/22 2015 108/68 -- -- 92 -- 98 % --   04/04/22 2000 110/67 -- -- 94 -- 98 % --   04/04/22 1955 -- -- -- 94 -- 97 % --   04/04/22 1950 -- -- -- 97 -- 97 % --   04/1945 113/74 -- -- 98 -- 99 % --   04/04/22 1940 -- -- -- 99 -- 96 % --   04/04/22 1931 132/77 99.9  F (37.7  C) Oral 98 20 (!) 87 % 68 kg (150 lb)       Physical Exam  General/Appearance: appears stated age, well-groomed, appears comfortable, leaning to the left  Eyes: EOMI, no scleral injection, no icterus  ENT: MMM  Neck: supple, nl ROM, no stiffness  Cardiovascular: RRR, nl S1S2, no m/r/g, 2+ pulses in all 4 extremities, cap refill <2sec  Respiratory: CTAB, good air movement throughout, no wheezes/rhonchi/rales, no increased WOB, no retractions, on 3L NC  GI: abd soft, non-distended, nttp,  no HSM, no rebound, no guarding, nl BS  MSK: DRAPER, good tone, no bony abnormality  Skin: warm and well-perfused, no rash, no edema, no ecchymosis, nl turgor  Neuro: GCS 15, alert and oriented, global weakness with slightly more drift in R extremities than left but all with drift, leaning to the left, baseline tremor  Psych: interacts appropriately  Heme: no petechia, no purpura, no active bleeding    Emergency Department Course   ECG  ECG taken at 1949, ECG read at 1951  Normal sinus rhythm. Minimal voltage criteria for LVH, may be normal variant. Inferior infarct, age undetermined.   Rate 97 bpm. MS interval 188 ms. QRS duration 84 ms. QT/QTc 382/485 ms. P-R-T axes 22 0 -6.     Imaging:  MRA Neck (Carotids) wo & w Contrast   Final Result   IMPRESSION:   1.  No evidence of large vessel occlusion or high-grade stenosis.      MR Brain w/o & w Contrast   Final Result   IMPRESSION:   1.  No evidence of acute ischemia or hemorrhage.   2.  Stable postoperative change of left-sided craniotomy and tumor  resection.   3.  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   4.  No change of dural based enhancing mass along the right occipital lobe, likely meningioma.   5.  Chronic/posttreatment changes as detailed.      MRA Brain ([place]) wo Contrast   Final Result   IMPRESSION:   1.  No evidence of large vessel occlusion or high-grade stenosis.   2.  Incidental 1 to 2 mm outpouchings off the bilateral internal carotid arteries at the expected origins of posterior communicating arteries, favored to represent incidental infundibula, unchanged.      XR Chest 2 Views   Final Result   IMPRESSION:    Shallow inspiration. Allowing for this there is the suggestion of a mild alveolar type infiltrate in the left midlung laterally. Direct auscultation may be beneficial to confirm.      Otherwise no acute abnormality or significant interval change. No discrete pleural effusion or pneumothorax. Anterior cervical spinal fusion. Degenerative change involving the spine. Interval removal of the right PICC.          Laboratory:  Labs Ordered and Resulted from Time of ED Arrival to Time of ED Departure   BASIC METABOLIC PANEL - Abnormal       Result Value    Sodium 139      Potassium 3.2 (*)     Chloride 103      Carbon Dioxide (CO2) 29      Anion Gap 7      Urea Nitrogen 22      Creatinine 1.42 (*)     Calcium 9.1      Glucose 216 (*)     GFR Estimate 38 (*)    ROUTINE UA WITH MICROSCOPIC REFLEX TO CULTURE - Abnormal    Color Urine Yellow      Appearance Urine Clear      Glucose Urine Negative      Bilirubin Urine Negative      Ketones Urine Negative      Specific Gravity Urine 1.017      Blood Urine Small (*)     pH Urine 5.5      Protein Albumin Urine 30  (*)     Urobilinogen Urine Normal      Nitrite Urine Negative      Leukocyte Esterase Urine Trace (*)     Mucus Urine Present (*)     Amorphous Crystals Urine Moderate (*)     RBC Urine 4 (*)     WBC Urine 2       Hyaline Casts Urine 12 (*)    CBC WITH PLATELETS AND DIFFERENTIAL - Abnormal    WBC Count 5.8      RBC Count 3.06 (*)     Hemoglobin 9.7 (*)     Hematocrit 31.1 (*)      (*)     MCH 31.7      MCHC 31.2 (*)     RDW 12.1      Platelet Count 171      % Neutrophils 78      % Lymphocytes 7      % Monocytes 12      % Eosinophils 2      % Basophils 0      % Immature Granulocytes 1      NRBCs per 100 WBC 0      Absolute Neutrophils 4.6      Absolute Lymphocytes 0.4 (*)     Absolute Monocytes 0.7      Absolute Eosinophils 0.1      Absolute Basophils 0.0      Absolute Immature Granulocytes 0.0      Absolute NRBCs 0.0     GLUCOSE BY METER - Abnormal    GLUCOSE BY METER POCT 197 (*)    PROCALCITONIN - Abnormal    Procalcitonin 0.26 (*)    TROPONIN I - Normal    Troponin I High Sensitivity 6     NT PROBNP INPATIENT - Normal    N terminal Pro BNP Inpatient 124     LACTIC ACID WHOLE BLOOD - Normal    Lactic Acid 0.9     INFLUENZA A/B & SARS-COV2 PCR MULTIPLEX   BLOOD CULTURE   BLOOD CULTURE        Emergency Department Course:  Reviewed:  I reviewed the patient's nursing notes, vitals, past medical records, and Care Everywhere.     Assessments:  1929 I performed an exam of the patient and obtained history, as documented above.     Consults:   1943 I consulted with Dr. Dionicio Jacobs, of stroke neurology, regarding the patient's history and presentation.    2150 I rechecked the patient and discussed history and workup with the ex-, who is now bedside. He states that the patient's mental status is not significantly different from when he last saw her at [place].    Interventions:   Levofloxacin 750 mg     Disposition:  The patient was admitted to the hospital under the care of Dr. Marion.     Impression & Plan      Medical Decision Making:  This patient is a 76-year-old female with a history of glioblastoma multiform a status post resection who presents with increased weakness.  She also appears to be leaning to the  left.  She does have mild drift in her right side but also has some drift in her left side that is just slightly less intense.  No family members were at the bedside upon initial evaluation so I was concerned for potential stroke.  I spoke with stroke neurology who, since we did not know when she was last seen normal, opted to go for MRI/MRA.  These have come back is unremarkable.  What has come back is abnormal as she was satting 87% on room air upon arrival.  With this few liters of oxygen it is come up nicely.  Chest x-ray is concerning for possible middle lobe infiltrate.  I think it is reasonable to treat her for this with antibiotics.  She has an allergy to azithromycin so she is being treated with levofloxacin.  She will be coming into the hospitalist service for continued care.    Diagnosis:    ICD-10-CM    1. Pneumonia of left lung due to infectious organism, unspecified part of lung  J18.9          Scribe Disclosure:  I, Carrie Marsh, am serving as a scribe at 7:31 PM on 4/4/2022 to document services personally performed by Jocelyn Dietz MD based on my observations and the provider's statements to me.      Jocelyn Dietz MD  04/04/22 1878

## 2022-04-06 ENCOUNTER — APPOINTMENT (OUTPATIENT)
Dept: OCCUPATIONAL THERAPY | Facility: CLINIC | Age: 77
DRG: 871 | End: 2022-04-06
Attending: HOSPITALIST
Payer: MEDICARE

## 2022-04-06 ENCOUNTER — APPOINTMENT (OUTPATIENT)
Dept: SPEECH THERAPY | Facility: CLINIC | Age: 77
DRG: 871 | End: 2022-04-06
Payer: MEDICARE

## 2022-04-06 LAB
ANION GAP SERPL CALCULATED.3IONS-SCNC: 4 MMOL/L (ref 3–14)
BUN SERPL-MCNC: 21 MG/DL (ref 7–30)
CALCIUM SERPL-MCNC: 8.8 MG/DL (ref 8.5–10.1)
CHLORIDE BLD-SCNC: 111 MMOL/L (ref 94–109)
CO2 SERPL-SCNC: 28 MMOL/L (ref 20–32)
CREAT SERPL-MCNC: 1.11 MG/DL (ref 0.52–1.04)
ERYTHROCYTE [DISTWIDTH] IN BLOOD BY AUTOMATED COUNT: 12 % (ref 10–15)
GFR SERPL CREATININE-BSD FRML MDRD: 51 ML/MIN/1.73M2
GLUCOSE BLD-MCNC: 101 MG/DL (ref 70–99)
HCT VFR BLD AUTO: 27.9 % (ref 35–47)
HGB BLD-MCNC: 8.5 G/DL (ref 11.7–15.7)
MCH RBC QN AUTO: 31.3 PG (ref 26.5–33)
MCHC RBC AUTO-ENTMCNC: 30.5 G/DL (ref 31.5–36.5)
MCV RBC AUTO: 103 FL (ref 78–100)
PLATELET # BLD AUTO: 150 10E3/UL (ref 150–450)
POTASSIUM BLD-SCNC: 3.5 MMOL/L (ref 3.4–5.3)
RBC # BLD AUTO: 2.72 10E6/UL (ref 3.8–5.2)
SARS-COV-2 RNA RESP QL NAA+PROBE: NEGATIVE
SODIUM SERPL-SCNC: 143 MMOL/L (ref 133–144)
WBC # BLD AUTO: 4.3 10E3/UL (ref 4–11)

## 2022-04-06 PROCEDURE — 85027 COMPLETE CBC AUTOMATED: CPT | Performed by: HOSPITALIST

## 2022-04-06 PROCEDURE — 97530 THERAPEUTIC ACTIVITIES: CPT | Mod: GO

## 2022-04-06 PROCEDURE — 99233 SBSQ HOSP IP/OBS HIGH 50: CPT | Performed by: INTERNAL MEDICINE

## 2022-04-06 PROCEDURE — 80048 BASIC METABOLIC PNL TOTAL CA: CPT | Performed by: HOSPITALIST

## 2022-04-06 PROCEDURE — 250N000011 HC RX IP 250 OP 636: Performed by: HOSPITALIST

## 2022-04-06 PROCEDURE — 250N000011 HC RX IP 250 OP 636: Performed by: INTERNAL MEDICINE

## 2022-04-06 PROCEDURE — 92526 ORAL FUNCTION THERAPY: CPT | Mod: GN

## 2022-04-06 PROCEDURE — 258N000003 HC RX IP 258 OP 636: Performed by: HOSPITALIST

## 2022-04-06 PROCEDURE — 250N000013 HC RX MED GY IP 250 OP 250 PS 637: Performed by: INTERNAL MEDICINE

## 2022-04-06 PROCEDURE — 258N000003 HC RX IP 258 OP 636: Performed by: INTERNAL MEDICINE

## 2022-04-06 PROCEDURE — 120N000001 HC R&B MED SURG/OB

## 2022-04-06 PROCEDURE — 250N000013 HC RX MED GY IP 250 OP 250 PS 637: Performed by: HOSPITALIST

## 2022-04-06 PROCEDURE — 36415 COLL VENOUS BLD VENIPUNCTURE: CPT | Performed by: HOSPITALIST

## 2022-04-06 PROCEDURE — U0005 INFEC AGEN DETEC AMPLI PROBE: HCPCS | Performed by: INTERNAL MEDICINE

## 2022-04-06 PROCEDURE — 97166 OT EVAL MOD COMPLEX 45 MIN: CPT | Mod: GO

## 2022-04-06 RX ORDER — DEXTROSE MONOHYDRATE, SODIUM CHLORIDE, AND POTASSIUM CHLORIDE 50; 1.49; 4.5 G/1000ML; G/1000ML; G/1000ML
INJECTION, SOLUTION INTRAVENOUS CONTINUOUS
Status: DISCONTINUED | OUTPATIENT
Start: 2022-04-06 | End: 2022-04-12

## 2022-04-06 RX ADMIN — MIRTAZAPINE 15 MG: 15 TABLET, FILM COATED ORAL at 21:42

## 2022-04-06 RX ADMIN — LEVETIRACETAM 1000 MG: 500 TABLET, FILM COATED ORAL at 21:45

## 2022-04-06 RX ADMIN — BUSPIRONE HYDROCHLORIDE 30 MG: 15 TABLET ORAL at 21:42

## 2022-04-06 RX ADMIN — FLUOXETINE 60 MG: 20 CAPSULE ORAL at 08:06

## 2022-04-06 RX ADMIN — OLANZAPINE 5 MG: 5 TABLET, FILM COATED ORAL at 21:42

## 2022-04-06 RX ADMIN — LEVETIRACETAM 250 MG: 250 TABLET, FILM COATED ORAL at 21:41

## 2022-04-06 RX ADMIN — DOXYCYCLINE 100 MG: 100 INJECTION, POWDER, LYOPHILIZED, FOR SOLUTION INTRAVENOUS at 16:34

## 2022-04-06 RX ADMIN — PANTOPRAZOLE SODIUM 40 MG: 40 TABLET, DELAYED RELEASE ORAL at 16:37

## 2022-04-06 RX ADMIN — TAZOBACTAM SODIUM AND PIPERACILLIN SODIUM 4.5 G: 500; 4 INJECTION, SOLUTION INTRAVENOUS at 18:16

## 2022-04-06 RX ADMIN — MIRABEGRON 25 MG: 25 TABLET, FILM COATED, EXTENDED RELEASE ORAL at 08:41

## 2022-04-06 RX ADMIN — DOXYCYCLINE 100 MG: 100 INJECTION, POWDER, LYOPHILIZED, FOR SOLUTION INTRAVENOUS at 04:17

## 2022-04-06 RX ADMIN — QUETIAPINE FUMARATE 50 MG: 25 TABLET ORAL at 21:42

## 2022-04-06 RX ADMIN — LEVETIRACETAM 1000 MG: 500 TABLET, FILM COATED ORAL at 08:05

## 2022-04-06 RX ADMIN — POTASSIUM CHLORIDE, DEXTROSE MONOHYDRATE AND SODIUM CHLORIDE: 150; 5; 450 INJECTION, SOLUTION INTRAVENOUS at 18:57

## 2022-04-06 RX ADMIN — CEFTRIAXONE 2 G: 2 INJECTION, POWDER, FOR SOLUTION INTRAMUSCULAR; INTRAVENOUS at 00:52

## 2022-04-06 RX ADMIN — THERA TABS 1 TABLET: TAB at 08:06

## 2022-04-06 RX ADMIN — METHYLPHENIDATE HYDROCHLORIDE 2.5 MG: 5 TABLET ORAL at 06:37

## 2022-04-06 RX ADMIN — SODIUM CHLORIDE, PRESERVATIVE FREE: 5 INJECTION INTRAVENOUS at 14:59

## 2022-04-06 RX ADMIN — LEVETIRACETAM 250 MG: 250 TABLET, FILM COATED ORAL at 08:06

## 2022-04-06 RX ADMIN — RIVAROXABAN 20 MG: 20 TABLET, FILM COATED ORAL at 16:37

## 2022-04-06 RX ADMIN — ACETAMINOPHEN 650 MG: 325 TABLET, FILM COATED ORAL at 15:04

## 2022-04-06 RX ADMIN — BUSPIRONE HYDROCHLORIDE 30 MG: 15 TABLET ORAL at 08:06

## 2022-04-06 RX ADMIN — PANTOPRAZOLE SODIUM 40 MG: 40 TABLET, DELAYED RELEASE ORAL at 06:37

## 2022-04-06 ASSESSMENT — ACTIVITIES OF DAILY LIVING (ADL)
ADLS_ACUITY_SCORE: 23
ADLS_ACUITY_SCORE: 27
ADLS_ACUITY_SCORE: 23
ADLS_ACUITY_SCORE: 27
ADLS_ACUITY_SCORE: 23
ADLS_ACUITY_SCORE: 23
ADLS_ACUITY_SCORE: 27
ADLS_ACUITY_SCORE: 23
ADLS_ACUITY_SCORE: 27
ADLS_ACUITY_SCORE: 23
ADLS_ACUITY_SCORE: 27
ADLS_ACUITY_SCORE: 23
ADLS_ACUITY_SCORE: 27
IADL_COMMENTS: FACILITY PROVIDES IADL ASSIST
ADLS_ACUITY_SCORE: 27
ADLS_ACUITY_SCORE: 27
ADLS_ACUITY_SCORE: 23
ADLS_ACUITY_SCORE: 23
ADLS_ACUITY_SCORE: 27

## 2022-04-06 NOTE — PLAN OF CARE
SLP: Per RN, coughing with liquids (good tolerance of food) this AM and noted temperature/triggered sepsis last PM. Dysphagia tx completed - see flowsheet for full details. x1 cough only with consecutive sips of mildly thick liquids via straw - no signs/sx with single sips or tsp presentation across 4 oz. Cautiously continue current diet, recommended tsp or straws OK via single sips/no consecutive sips when fully alert/upright and 1:1 assist - HOLD PO if any ongoing overt difficulty. Video fluoroscopic swallow study tomorrow for objective assessment.

## 2022-04-06 NOTE — PLAN OF CARE
"/60 (BP Location: Right arm)   Pulse 82   Temp 98.3  F (36.8  C) (Oral)   Resp 20   Ht 1.6 m (5' 3\")   Wt 60.7 kg (133 lb 14.4 oz)   SpO2 93%   BMI 23.72 kg/m        Ax2. A2 lift, repos Q2 hrs. On RA- 2L NC. Good appetite for breakfast, poor for lunch. Speech re consulted, pt is coughing with mildly thick liquids. Plan is for video swallow tomorrow. NS infusing at 75ml/hr. Pt denies pain, N/V. IV Doxy for PNA. PT/OT following. Will continue POC.       "

## 2022-04-06 NOTE — PLAN OF CARE
Physical Therapy: Orders received. Chart reviewed and discussed with care team.? Physical Therapy not indicated due to pt currently requiring lift support with minimal tolerance for mobility. OT following IP most appropriate to defer PT to the home/LTC setting.? Defer discharge recommendations to OT.? Will complete orders.

## 2022-04-06 NOTE — PROGRESS NOTES
Patient spiked temperature will get blood cultures.  Patient already on antibiotics continue to monitor

## 2022-04-06 NOTE — PROGRESS NOTES
[facility]    Medicine Progress Note - Hospitalist Service    Date of Admission:  4/4/2022    Assessment & Plan     Olga Bailey is a 76 year old female patient with past medical history of glioblastoma multiforme of the left parietal region who underwent craniotomy and complete resection in March 2021 as well as radiation and chemotherapy, PJP pneumonia, TRALI, seizure disorder on Keppra, depressive disorder, uncomplicated asthma, who is currently residing in nursing home was brought to emergency room for evaluation for generalized weakness.       Patient states that she has generalized weakness which has been gradually getting worse.  She had some right-sided weakness.  She also states that her weakness also shifted to the left side.  She had no cough or shortness of breath.  She also denies subjective fever, vomiting, diarrhea, urinary symptoms.  She had significantly decreased oral intake.  To note, patient is followed by [facility] and is currently     EMS was called and she was noted to be hypoxic with oxygen saturation 88%.  She was given neb treatment.  She was brought to emergency room for evaluation.  On arrival to emergency room, her vital signs showed  temperature 99.9, pulse 94, blood pressure 113/74, oxygen saturation 97% on 3 L.  Laboratory work-up showed sodium 139, potassium 3.2, creatinine 1.42, , lactic acid 0.9, WBC 5.8, hemoglobin 9.7.  Procalcitonin 0.26.  Chest x-ray showed mild alveolar infiltrate in the left midlung.  MRI of the brain showed no evidence of acute ischemia or hemorrhage.  Stable postoperative change in the left side craniotomy and tumor resection.  MRA of the brain/neck negative for high-grade stenosis.     She was admitted to the hospital for further management including IV fluids and IV antibiotics for pneumonia.  Neurology was contacted by phone but given unremarkable MRI/MRA is not thought to represent a stroke.  Antibiotics were  transitioned to ceftriaxone/doxycycline and she was started on IV fluids and admitted to the hospital.     Problem list:    Sepsis (fever and tachycardia)  Community acquired pneumonia versus aspiration pneumonia  Acute hypoxic respiratory failure  -Presented with weakness and poor oral intake  -Emergency department evaluation showed hypoxia and low-grade fever.  Procalcitonin was slightly elevated but white blood cell count and lactic acid were normal at the time of presentation.  Was admitted and treated with Rocephin and doxycycline.  -Developed higher fever with tachycardia overnight on 4/5/2022  -Change Rocephin to Zosyn to cover possible aspiration pneumonia (dysphagia noted during this hospitalization, see below)  -A.m. CBC    Dysphagia  -Speech therapy consult appreciated  -Unsure if due to weakness or this proceeded current illness  -Modified diet ordered  -Changing Rocephin to Zosyn to cover possible aspiration pneumonia as discussed above     Hypokalemia  -Replace per protocol     History of glioblastoma multiforme in the left parietal region status post complete resection in March 2021  -Follows with [facility].  Is on radiation 5/2021.  Started adjuvant therapy with temozolomide which was complicated by hematologic toxicity so changed to metronomic/daily dosing and 7/2021 which was tolerated and showed positive treatment effects.  She has completed 6 months of adjuvant temozolomide as of 12/2021.  Follow-up imaging 2/2022 without concern and plan to remain off treatment and on imaging surveillance moving forward.     History of seizure disorder  -On Keppra, resumed.     Chronic kidney disease  -Baseline creatinine around 1.3-1.45.  Creatinine 1.11 today.    -Decrease IV fluids to 50 mL/h     Depression  Deconditioning  Poor functional status  -Continue prior to admission BuSpar, fluoxetine, Ritalin, olanzapine, and Seroquel.  She is followed by PMR and palliative care as an outpatient.  She  has noticed to have decline in her functional status due to poorly controlled depression and lack of inspiration/motivation with chronic fatigue and neurological deficits.   -PT and OT consulted.  PT deferring care given current status  -Consider palliative care involvement during the hospitalization if daughter agrees.     History of bilateral DVT and retroperitoneal hemorrhage while on anticoagulation now with IVC filter   -Found to have bilateral DVT during hospitalization last year and started on enoxaparin which was complicated by right-sided retroperitoneal hemorrhage requiring transfusion support.  She is now on anticoagulation with Xarelto.     Acute on chronic anemia  Pancytopenia  -Hemoglobin was down to 8.0 but now up to 8.5.  Baseline hemoglobin appears to be about 9.5-10.5.  No reports of bleeding per nursing.    -Resume prior to admission Xarelto (blood cells with low hemoglobin)         Diet: Combination Diet Soft and Bite Sized Diet (level 6); Mildly Thick (level 2)  Snacks/Supplements Adult: Ensure Enlive; With Meals    DVT Prophylaxis: DOAC  Martino Catheter: Not present  Central Lines: None  Cardiac Monitoring: None  Code Status: Full Code      Disposition Plan   Expected Discharge: 04/07/2022     Anticipated discharge location: assisted living           The patient's care was discussed with the Bedside Nurse, Patient and Patient's Family.    Srikanth Walsh MD  Hospitalist Service  [facility]  Securely message with the Vocera Web Console (learn more here)  Text page via [employer] Paging/Directory         Clinically Significant Risk Factors Present on Admission   # Severe Malnutrition: based on nutrition assessment     ______________________________________________________________________    Interval History   Was somewhat confused this morning.  Improved later in the day.  Feels weak.  Denies cough, chest pain, shortness of breath, abdominal pain, nausea, vomiting, diarrhea,  and dysuria.    Data reviewed today: I reviewed all medications, new labs and imaging results over the last 24 hours.     Physical Exam   Vital Signs: Temp: (!) 101.7  F (38.7  C) Temp src: Axillary BP: 119/60 Pulse: 82   Resp: 20 SpO2: 93 % O2 Device: Nasal cannula Oxygen Delivery: 2 LPM  Weight: 133 lbs 14.4 oz  GENERAL:  Comfortable. Cooperative.  PSYCH: pleasant, oriented to person and place, No acute distress.  EYES: PERRLA, Normal conjunctiva.  HEART:  Regular rate and rhythm. No JVD. Pulses normal. No edema.  LUNGS:  Clear to auscultation, normal Respiratory effort.  ABDOMEN:  Soft, no hepatosplenomegaly, normal bowel sounds.  EXTREMETIES: No clubbing, cyanosis or ischemia  SKIN:  Dry to touch, No rash.      Data   Recent Labs   Lab 04/06/22  0643 04/05/22  1132 04/05/22  0556 04/05/22  0116 04/04/22  1941   WBC 4.3  --  3.6*  --  5.8   HGB 8.5* 8.3* 8.0*  --  9.7*   *  --  101*  --  102*     --  140*  --  171     --  142  --  139   POTASSIUM 3.5 3.8 3.3*   < > 3.2*   CHLORIDE 111*  --  108  --  103   CO2 28  --  30  --  29   BUN 21  --  21  --  22   CR 1.11*  --  1.27*  --  1.42*   ANIONGAP 4  --  4  --  7   ESTIVEN 8.8  --  8.6  --  9.1   *  --  97  --  216*    < > = values in this interval not displayed.

## 2022-04-06 NOTE — PROGRESS NOTES
04/06/22 0949   Quick Adds   Type of Visit Initial Occupational Therapy Evaluation   Living Environment   People in Home facility resident   Current Living Arrangements assisted living   Home Accessibility no concerns   Living Environment Comments Info obtain from chart as pt is questionable historian: Pt resides at [facility]   Self-Care   Usual Activity Tolerance fair   Current Activity Tolerance poor   Equipment Currently Used at Home walker, standard;wheelchair, manual   Activity/Exercise/Self-Care Comment Per chart review, pt requires assist for all ADLs/IADLs and assist for transfers to w/c.    Instrumental Activities of Daily Living (IADL)   IADL Comments Facility provides IADL assist    General Information   Onset of Illness/Injury or Date of Surgery 04/04/22   Referring Physician Magnus Arevalo,    Patient/Family Therapy Goal Statement (OT) Not stated by patient   Additional Occupational Profile Info/Pertinent History of Current Problem Per chart: Pt is a 76 year old female admitted with progressive weakness, Acute hypoxic respiratory failure likely secondary to community-acquired pneumonia. PMH significant for glioblastoma multiforme in the left parietal region status post complete resection in March 2021.   Cognitive Status Examination   Orientation Status person   Affect/Mental Status (Cognitive) confused   Cognitive Status Comments Lethargic and confused.    Visual Perception   Visual Impairment/Limitations corrective lenses full-time   Pain Assessment   Patient Currently in Pain No   Range of Motion Comprehensive   Comment, General Range of Motion Slight L hand contracture and/or tone; decreased UE AROM   Strength Comprehensive (MMT)   Comment, General Manual Muscle Testing (MMT) Assessment Significant weakness in BUEs; weak B gross grasp    Bed Mobility   Bed Mobility supine-sit;sit-supine;rolling right   Rolling Right [place] (Bed Mobility) maximum assist (25% patient  effort)   Supine-Sit [place] (Bed Mobility) maximum assist (25% patient effort)   Sit-Supine [place] (Bed Mobility) maximum assist (25% patient effort)   Transfers   Transfer Comments Requiring lift assist   Balance   Balance Comments Poor sitting balance due to significant lateral lean to R side, unable to self correct requiring max A to maintain   Activities of Daily Living   BADL Assessment/Intervention grooming   Grooming Assessment/Training   [place] Level (Grooming) maximum assist (25% patient effort)   Clinical Impression   Criteria for Skilled Therapeutic Interventions Met (OT) Yes, treatment indicated   OT Diagnosis Impaired ADLs, transfers   OT Problem List-Impairments impacting ADL problems related to;activity tolerance impaired;balance;cognition;strength;pain   Assessment of Occupational Performance 3-5 Performance Deficits   Identified Performance Deficits Transfers, grooming, feeding   Planned Therapy Interventions (OT) ADL retraining;cognition;ROM;transfer training   Clinical Decision Making Complexity (OT) moderate complexity   Risk & Benefits of therapy have been explained evaluation/treatment results reviewed;care plan/treatment goals reviewed;risks/benefits reviewed;current/potential barriers reviewed;participants voiced agreement with care plan;participants included;patient   OT Discharge Planning   OT Discharge Recommendation (DC Rec) Long term care facility;home with home care occupational therapy;home with assist;Leaving home requires significant assistance;Leaving home requires significant taxing effort   OT Rationale for DC Rec Pt presenting below baseline level of functioning. Limited by significant lethargy and global weakness. Per chart, prison is able to provide lift assist for transfers and resumed support for all ADLS. [facility]ticipate [facility] to prison w/ lift assist, resumed assist for all ADLs/IADLs and resumed HH therapy services.    Total Evaluation Time (Minutes)   Total  Evaluation Time (Minutes) 8   OT Goals   Therapy Frequency (OT) 3 times/wk   OT Predicated Duration/Target Date for Goal Attainment 04/12/22   OT Goals Hygiene/Grooming;Transfers;OT Goal 1   OT: Hygiene/Grooming minimal assist   OT: Transfer Maximum assist;with assistive device   OT: Goal 1 Pt will participate in BUE HEP in order to improve strength needed for assisting with ADLs in discharge environment.

## 2022-04-06 NOTE — PROGRESS NOTES
Care Management Follow Up    Length of Stay (days): 2    Expected Discharge Date: 04/07/2022     Concerns to be Addressed: care coordination/care conferences, discharge planning     Patient plan of care discussed at interdisciplinary rounds: Yes    Anticipated Discharge Disposition: Assisted Living     Anticipated Discharge Services: None  Anticipated Discharge DME: None    Patient/family educated on Medicare website which has current facility and service quality ratings: no  Education Provided on the Discharge Plan:  no  Patient/Family in Agreement with the Plan: yes    Referrals Placed by CM/SW: External Care Coordination  Private pay costs discussed: Not applicable    Additional Information:  OT is recommending pt return to [facility] with lift. RAMESH called and spoke with Yudy, nurse [phone] at [facility].  She said that the facility does NOT have a teresa lift, but would allow pt/family to rent one.  If they rent a teresa she requires it to be electric not manual pump.  Yudy mentioned the dtr had mentioned hospice.  RAMESH spoke with provider to see if palliative care consult would be appropriate, provider will discuss with pt.  CM will continue to follow for care coordination.    Lorna Ku RN, BSN, PHN, [facility]  Care Coordinator  [facility]  [phone]

## 2022-04-07 ENCOUNTER — APPOINTMENT (OUTPATIENT)
Dept: GENERAL RADIOLOGY | Facility: CLINIC | Age: 77
DRG: 871 | End: 2022-04-07
Attending: INTERNAL MEDICINE
Payer: MEDICARE

## 2022-04-07 ENCOUNTER — APPOINTMENT (OUTPATIENT)
Dept: SPEECH THERAPY | Facility: CLINIC | Age: 77
DRG: 871 | End: 2022-04-07
Payer: MEDICARE

## 2022-04-07 LAB
ANION GAP SERPL CALCULATED.3IONS-SCNC: 6 MMOL/L (ref 3–14)
BUN SERPL-MCNC: 14 MG/DL (ref 7–30)
CALCIUM SERPL-MCNC: 8.8 MG/DL (ref 8.5–10.1)
CHLORIDE BLD-SCNC: 110 MMOL/L (ref 94–109)
CO2 SERPL-SCNC: 26 MMOL/L (ref 20–32)
CREAT SERPL-MCNC: 1 MG/DL (ref 0.52–1.04)
ERYTHROCYTE [DISTWIDTH] IN BLOOD BY AUTOMATED COUNT: 12.1 % (ref 10–15)
GFR SERPL CREATININE-BSD FRML MDRD: 58 ML/MIN/1.73M2
GLUCOSE BLD-MCNC: 120 MG/DL (ref 70–99)
HCT VFR BLD AUTO: 27.4 % (ref 35–47)
HGB BLD-MCNC: 8.3 G/DL (ref 11.7–15.7)
MAGNESIUM SERPL-MCNC: 1.8 MG/DL (ref 1.6–2.3)
MCH RBC QN AUTO: 32.1 PG (ref 26.5–33)
MCHC RBC AUTO-ENTMCNC: 31.3 G/DL (ref 31.5–36.5)
MCV RBC AUTO: 105 FL (ref 78–100)
PLATELET # BLD AUTO: 126 10E3/UL (ref 150–450)
POTASSIUM BLD-SCNC: 3.2 MMOL/L (ref 3.4–5.3)
POTASSIUM BLD-SCNC: 3.3 MMOL/L (ref 3.4–5.3)
POTASSIUM BLD-SCNC: 3.5 MMOL/L (ref 3.4–5.3)
RBC # BLD AUTO: 2.62 10E6/UL (ref 3.8–5.2)
SODIUM SERPL-SCNC: 142 MMOL/L (ref 133–144)
WBC # BLD AUTO: 2.8 10E3/UL (ref 4–11)

## 2022-04-07 PROCEDURE — 36415 COLL VENOUS BLD VENIPUNCTURE: CPT | Performed by: INTERNAL MEDICINE

## 2022-04-07 PROCEDURE — 250N000011 HC RX IP 250 OP 636: Performed by: INTERNAL MEDICINE

## 2022-04-07 PROCEDURE — 92611 MOTION FLUOROSCOPY/SWALLOW: CPT | Mod: GN

## 2022-04-07 PROCEDURE — 83735 ASSAY OF MAGNESIUM: CPT | Performed by: INTERNAL MEDICINE

## 2022-04-07 PROCEDURE — 84132 ASSAY OF SERUM POTASSIUM: CPT | Performed by: INTERNAL MEDICINE

## 2022-04-07 PROCEDURE — 250N000013 HC RX MED GY IP 250 OP 250 PS 637: Performed by: HOSPITALIST

## 2022-04-07 PROCEDURE — 250N000013 HC RX MED GY IP 250 OP 250 PS 637: Performed by: INTERNAL MEDICINE

## 2022-04-07 PROCEDURE — 80048 BASIC METABOLIC PNL TOTAL CA: CPT | Performed by: INTERNAL MEDICINE

## 2022-04-07 PROCEDURE — 258N000003 HC RX IP 258 OP 636: Performed by: INTERNAL MEDICINE

## 2022-04-07 PROCEDURE — 120N000001 HC R&B MED SURG/OB

## 2022-04-07 PROCEDURE — 99233 SBSQ HOSP IP/OBS HIGH 50: CPT | Performed by: INTERNAL MEDICINE

## 2022-04-07 PROCEDURE — 74230 X-RAY XM SWLNG FUNCJ C+: CPT

## 2022-04-07 RX ORDER — BARIUM SULFATE 400 MG/ML
SUSPENSION ORAL ONCE
Status: COMPLETED | OUTPATIENT
Start: 2022-04-07 | End: 2022-04-07

## 2022-04-07 RX ORDER — POTASSIUM CHLORIDE 7.45 MG/ML
10 INJECTION INTRAVENOUS
Status: DISPENSED | OUTPATIENT
Start: 2022-04-07 | End: 2022-04-07

## 2022-04-07 RX ORDER — QUETIAPINE FUMARATE 25 MG/1
25 TABLET, FILM COATED ORAL AT BEDTIME
Status: DISCONTINUED | OUTPATIENT
Start: 2022-04-07 | End: 2022-04-11

## 2022-04-07 RX ORDER — BARIUM SULFATE 400 MG/ML
SUSPENSION ORAL ONCE
Status: DISCONTINUED | OUTPATIENT
Start: 2022-04-07 | End: 2022-04-07

## 2022-04-07 RX ADMIN — THERA TABS 1 TABLET: TAB at 08:07

## 2022-04-07 RX ADMIN — LEVETIRACETAM 1000 MG: 500 TABLET, FILM COATED ORAL at 08:06

## 2022-04-07 RX ADMIN — OLANZAPINE 5 MG: 5 TABLET, FILM COATED ORAL at 21:46

## 2022-04-07 RX ADMIN — RIVAROXABAN 20 MG: 20 TABLET, FILM COATED ORAL at 17:31

## 2022-04-07 RX ADMIN — TAZOBACTAM SODIUM AND PIPERACILLIN SODIUM 4.5 G: 500; 4 INJECTION, SOLUTION INTRAVENOUS at 18:57

## 2022-04-07 RX ADMIN — LEVETIRACETAM 250 MG: 250 TABLET, FILM COATED ORAL at 21:46

## 2022-04-07 RX ADMIN — PANTOPRAZOLE SODIUM 40 MG: 40 TABLET, DELAYED RELEASE ORAL at 15:48

## 2022-04-07 RX ADMIN — TAZOBACTAM SODIUM AND PIPERACILLIN SODIUM 4.5 G: 500; 4 INJECTION, SOLUTION INTRAVENOUS at 23:57

## 2022-04-07 RX ADMIN — QUETIAPINE FUMARATE 25 MG: 25 TABLET ORAL at 21:46

## 2022-04-07 RX ADMIN — POTASSIUM CHLORIDE, DEXTROSE MONOHYDRATE AND SODIUM CHLORIDE: 150; 5; 450 INJECTION, SOLUTION INTRAVENOUS at 06:13

## 2022-04-07 RX ADMIN — LEVETIRACETAM 250 MG: 250 TABLET, FILM COATED ORAL at 08:08

## 2022-04-07 RX ADMIN — BARIUM SULFATE: 400 SUSPENSION ORAL at 09:19

## 2022-04-07 RX ADMIN — BUSPIRONE HYDROCHLORIDE 30 MG: 15 TABLET ORAL at 21:46

## 2022-04-07 RX ADMIN — FLUOXETINE 60 MG: 20 CAPSULE ORAL at 08:06

## 2022-04-07 RX ADMIN — MIRABEGRON 25 MG: 25 TABLET, FILM COATED, EXTENDED RELEASE ORAL at 08:06

## 2022-04-07 RX ADMIN — ACETAMINOPHEN 650 MG: 325 TABLET, FILM COATED ORAL at 08:06

## 2022-04-07 RX ADMIN — LEVETIRACETAM 1000 MG: 500 TABLET, FILM COATED ORAL at 21:49

## 2022-04-07 RX ADMIN — DOXYCYCLINE 100 MG: 100 INJECTION, POWDER, LYOPHILIZED, FOR SOLUTION INTRAVENOUS at 04:24

## 2022-04-07 RX ADMIN — DOXYCYCLINE 100 MG: 100 INJECTION, POWDER, LYOPHILIZED, FOR SOLUTION INTRAVENOUS at 15:49

## 2022-04-07 RX ADMIN — TAZOBACTAM SODIUM AND PIPERACILLIN SODIUM 4.5 G: 500; 4 INJECTION, SOLUTION INTRAVENOUS at 00:20

## 2022-04-07 RX ADMIN — MIRTAZAPINE 15 MG: 15 TABLET, FILM COATED ORAL at 21:46

## 2022-04-07 RX ADMIN — BUSPIRONE HYDROCHLORIDE 30 MG: 15 TABLET ORAL at 08:07

## 2022-04-07 RX ADMIN — METHYLPHENIDATE HYDROCHLORIDE 2.5 MG: 5 TABLET ORAL at 07:01

## 2022-04-07 RX ADMIN — PANTOPRAZOLE SODIUM 40 MG: 40 TABLET, DELAYED RELEASE ORAL at 07:01

## 2022-04-07 RX ADMIN — TAZOBACTAM SODIUM AND PIPERACILLIN SODIUM 4.5 G: 500; 4 INJECTION, SOLUTION INTRAVENOUS at 06:12

## 2022-04-07 RX ADMIN — TAZOBACTAM SODIUM AND PIPERACILLIN SODIUM 4.5 G: 500; 4 INJECTION, SOLUTION INTRAVENOUS at 13:56

## 2022-04-07 RX ADMIN — POTASSIUM CHLORIDE 10 MEQ: 7.46 INJECTION, SOLUTION INTRAVENOUS at 10:29

## 2022-04-07 ASSESSMENT — ACTIVITIES OF DAILY LIVING (ADL)
ADLS_ACUITY_SCORE: 25
ADLS_ACUITY_SCORE: 27
ADLS_ACUITY_SCORE: 27
ADLS_ACUITY_SCORE: 25
ADLS_ACUITY_SCORE: 25
ADLS_ACUITY_SCORE: 27
ADLS_ACUITY_SCORE: 27
ADLS_ACUITY_SCORE: 25
ADLS_ACUITY_SCORE: 27
ADLS_ACUITY_SCORE: 25
ADLS_ACUITY_SCORE: 27
ADLS_ACUITY_SCORE: 25
ADLS_ACUITY_SCORE: 27
ADLS_ACUITY_SCORE: 25

## 2022-04-07 NOTE — PROGRESS NOTES
[facility]    Medicine Progress Note - Hospitalist Service    Date of Admission:  4/4/2022    Assessment & Plan     Olga Bailey is a 76 year old female with history of glioblastoma multiforme of the left parietal region who underwent craniotomy and complete resection in March 2021 as well as radiation and chemotherapy, PJP pneumonia, TRALI, seizure disorder on Keppra, depressive disorder, and uncomplicated asthma. She is currently residing in a nursing home and was brought to the [facility] emergency department on 4/4/22 for evaluation for generalized weakness.       Patient stated that she had generalized weakness which had gradually gotten worse.  She had some right-sided weakness.  She also states that her weakness also shifted to the left side.  She had no cough or shortness of breath.  She denied fever, vomiting, diarrhea, and urinary symptoms.  She had significantly decreased oral intake.      EMS was called and she was noted to be hypoxic with oxygen saturation 88%.  She was given neb treatment.  She was brought to emergency room for evaluation.  On arrival to emergency room, her vital signs showed  temperature 99.9, pulse 94, blood pressure 113/74, oxygen saturation 97% on 3 L.  Laboratory work-up showed sodium 139, potassium 3.2, creatinine 1.42, , lactic acid 0.9, WBC 5.8, hemoglobin 9.7.  Procalcitonin 0.26.  Chest x-ray showed mild alveolar infiltrate in the left midlung.  MRI of the brain showed no evidence of acute ischemia or hemorrhage.  Stable postoperative change in the left side craniotomy and tumor resection.  MRA of the brain/neck negative for high-grade stenosis.     She was admitted to the hospital for further management including IV fluids and IV antibiotics for pneumonia.  Neurology was contacted by phone  given unremarkable MRI/MRA was not thought to have had a stroke.  Ceftriaxone and doxycycline were started.  IV fluids were started.  Olga was admitted to the hospital for further cares.  After admission she developed high fevers.  She remained lethargic and developed worsening confusion.  Some concern about aspiration developed so she was seen by speech therapy.  Modified diet was ordered.  Ceftriaxone was changed to Zosyn to cover aspiration.  Video swallow study was performed and modified diet was continued.      Problem list:     Sepsis (fever and tachycardia)  Community acquired pneumonia versus aspiration pneumonia (favor aspiration pneumonia)  Acute hypoxic respiratory failure  -Presented with weakness and poor oral intake  -Emergency department evaluation showed hypoxia and low-grade fever.  Procalcitonin was slightly elevated but white blood cell count and lactic acid were normal at the time of presentation.  Was admitted and treated with Rocephin and doxycycline.  -Developed higher fever with tachycardia overnight on 4/5/2022  -Ceftriaxone was changed to Zosyn to cover possible aspiration pneumonia (dysphagia noted during this hospitalization, see below)  -Seems to be improving now with improved mental status.  Still having intermittent fevers.  -A.m. CBC  -If high fevers persist or if patient declines with worsening mental status consider CT of chest to better characterize pneumonia.     Dysphagia  -Speech therapy consult appreciated  -Video swallow study done today  -Modified diet ordered  -Continue Zosyn for suspected aspiration pneumonia     Hypokalemia  -Replace per protocol     History of glioblastoma multiforme in the left parietal region status post complete resection in March 2021  -Follows with [facility].  Is on radiation 5/2021.  Started adjuvant therapy with temozolomide which was complicated by hematologic toxicity so changed to metronomic/daily dosing and 7/2021 which was tolerated and showed positive treatment effects.  She has completed 6 months of adjuvant temozolomide as of 12/2021.  Follow-up imaging  2/2022 without concern and plan to remain off treatment and on imaging surveillance moving forward.     History of seizure disorder  -On Keppra, resumed.     Chronic kidney disease  -Baseline creatinine around 1.3-1.45.  Creatinine 1.0 today.    -Decrease IV fluids to 50 mL/h and stop once eating and drinking well     Depression  Deconditioning  Poor functional status  -Continue prior to admission BuSpar, fluoxetine, Ritalin, olanzapine, and Seroquel.  She is followed by PMR and palliative care as an outpatient.  She has been noted to have declined in her functional status due to poorly controlled depression and lack of inspiration/motivation with chronic fatigue and neurological deficits.   -PT and OT consulted.  PT deferring care given current status  -Daughter was agreeable to palliative care consult which I have ordered.  -Daughter expressed concern about all of this patient's psychotropic medications.  I agree.  We agreed to decrease Seroquel from 50 mg at night to 25 mg at night.  If this reduction as tolerated consider stopping Seroquel altogether in a couple of days.     History of bilateral DVT and retroperitoneal hemorrhage while on anticoagulation now with IVC filter   -Found to have bilateral DVT during hospitalization last year and started on enoxaparin which was complicated by right-sided retroperitoneal hemorrhage requiring transfusion support.  She is now on anticoagulation with Xarelto.     Acute on chronic anemia  Pancytopenia  -Hemoglobin was down to 8.0 but then candelaria to 8.5.  Baseline hemoglobin appears to be about 9.5-10.5.  No reports of bleeding per nursing.    -Resume prior to admission Xarelto (blood cells with low hemoglobin)  -CBC in morning  -If hemoglobin dropping consider CT imaging to evaluate for retroperitoneal hematoma          Diet: Snacks/Supplements Adult: Ensure Enlive; With Meals  Combination Diet Soft and Bite Sized Diet (level 6); Mildly Thick (level 2) (Straws ok)    DVT  Prophylaxis: DOAC  Martino Catheter: Not present  Central Lines: None  Cardiac Monitoring: None  Code Status: Full Code      Disposition Plan   Expected Discharge: 3 to 5 days  Anticipated discharge location: assisted living  with home care versus long-term care         The patient's care was discussed with the Bedside Nurse, Patient and Patient's Family.    Srikanth Walsh MD  Hospitalist Service  [facility]  Securely message with the Vocera Web Console (learn more here)  Text page via [employer] Paging/Directory         Clinically Significant Risk Factors Present on Admission             # Severe Malnutrition: based on nutrition assessment     ______________________________________________________________________    Interval History   Patient is doing much better today.  Today, she feels as though she will live.  She denies chest pain and shortness of breath.  She denies abdominal pain, nausea, vomiting, and dysuria.    Data reviewed today: I reviewed all medications, new labs and imaging results over the last 24 hours.    Physical Exam   Vital Signs: Temp: 98  F (36.7  C) Temp src: Oral BP: 139/67 Pulse: 76   Resp: 22 SpO2: 96 % O2 Device: Nasal cannula Oxygen Delivery: 1 LPM  Weight: 133 lbs 14.4 oz  GENERAL:  Comfortable. Cooperative.  PSYCH: pleasant, oriented to person and roughly to place, No acute distress.  EYES: PERRLA, Normal conjunctiva.  HEART:  Regular rate and rhythm. No JVD. Pulses normal. No edema.  LUNGS:  Clear to auscultation, normal Respiratory effort.  ABDOMEN:  Soft, no hepatosplenomegaly, normal bowel sounds.  EXTREMETIES: No clubbing, cyanosis or ischemia  SKIN:  Dry to touch, No rash.      Data   Recent Labs   Lab 04/07/22  1505 04/07/22  0641 04/06/22  0643 04/05/22  1132 04/05/22  0556   WBC  --   --  4.3 2.8* 3.6*   HGB  --   --  8.5* 8.3*  8.3* 8.0*   MCV  --   --  103* 105* 101*   PLT  --   --  150 126* 140*   NA  --  142 143  --  142   POTASSIUM 3.5 3.3*  3.2*  3.5 3.8 3.3*   CHLORIDE  --  110* 111*  --  108   CO2  --  26 28  --  30   BUN  --  14 21  --  21   CR  --  1.00 1.11*  --  1.27*   ANIONGAP  --  6 4  --  4   ESTIVEN  --  8.8 8.8  --  8.6   GLC  --  120* 101*  --  97

## 2022-04-07 NOTE — PLAN OF CARE
Disoriented to time/situation. Lethargic. Assist 2 w/ lift. Infusing D5 NS 0.45% Kcl 20 mEq @ 100 ml/hr. Purewick in place. Output: 1000 ml. 1 L NC. K protocol. [facility] oncology following. LS: dim, crackles. Repo and change purewick. NPO all night for swallow study later today. Continue POC.

## 2022-04-07 NOTE — PLAN OF CARE
"Goal Outcome Evaluation:  Vitals:/60 (BP Location: Right arm)   Pulse 82   Temp 97.6  F (36.4  C) (Oral)   Resp 20   Ht 1.6 m (5' 3\")   Wt 60.7 kg (133 lb 14.4 oz)   SpO2 93%   BMI 23.72 kg/m     Pt had fever on days but does not have one this shift.   Pain: Denies  Neuro: Disoriented to time and situation. Pt very lethargic and sleepy.   Respiratory: LS diminished with fine crackles. 2L NC.    GI/: Pt did not void this shift. MD aware. Passed flatus.   LDAs: L PIV infusing D5 1/2 NS with K.   Diet: NPO except for meds in apple sauce until video swallow study tomorrow.   Activity: Pt repositioned. 3x this shift.   Plan: Continute to monitor temp.                      "

## 2022-04-07 NOTE — PLAN OF CARE
"/58 (BP Location: Right arm)   Pulse 86   Temp 97.8  F (36.6  C) (Axillary)   Resp 18   Ht 1.6 m (5' 3\")   Wt 60.7 kg (133 lb 14.4 oz)   SpO2 99%   BMI 23.72 kg/m        A&Ox2. A2 lift with Q2 repos. 1L O2 via NC. Purewick in place with zero output this shift. K 3.2, replaced. Getting IV Zosyn and Doxy for PNA. PT/OT/Speech following. Video swallow done today, passed and diet resumed. TMax 102.3, Tylenol given. Will continue POC.   "

## 2022-04-07 NOTE — PROGRESS NOTES
Notified patient briefly chocked while eating, she did get it out.  Patient was seen and examined by me, RN and family member at bedside. She was at her baseline respiratory status wise, saturating 96 percent on 1 L nasal cannula.  On exam there are scattered crackles noted more on the left side, but good air exchange.  She is not in respiratory distress.  She is able to cough and clear her throat.  -Noted patient had video swallow study today there was no sign of aspiration reported on the study.  -Adjusted her diet.  -Swallow reeval in the morning.  -Changed her diet to DD2.  -She needs to be supervised while she is eating to prevent another choking.  -At this point chest x-ray is not of use, she is back to her normal respiratory status.  -Consider getting chest x-ray tomorrow if she is tachypneic, for any sign of aspiration pneumonitis.  I discussed with patient family member and with RN.

## 2022-04-07 NOTE — PROGRESS NOTES
04/07/22 0934   General Information   Onset of Illness/Injury or Date of Surgery 04/04/22   Referring Physician Srikanth Walsh MD   Patient/Family Therapy Goal Statement (SLP) None stated.   Pertinent History of Current Problem The pt is a 76 year old female patient with past medical history of glioblastoma multiforme of the left parietal region who underwent craniotomy and complete resection in March 2021 as well as radiation and chemotherapy, PJP pneumonia, TRALI, seizure disorder on Keppra, depressive disorder, uncomplicated asthma, who is currently residing in nursing home was brought to emergency room for evaluation for generalized weakness. MRI of the brain showed no evidence of acute ischemia or hemorrhage. She was admitted to the hospital for further management including IV fluids and IV antibiotics for pneumonia. Video swallow evaluation completed per MD order.   General Observations The pt is fatigued and minimally verbal this date. Slowed responses. Unable to follow commands for trial of chin tuck or cued cough.   Past History of Dysphagia PMHx oropharyngeal dysphagia s/p glioblastoma resection in 3/2021. VFSS's: 4/21/21, 5/21/21, 6/11/21. First x1 VFSS's showed silent aspiration with recommendations for soft/moist solids and mildly thick liquids. Improvements on last VFSS s/p SLP intervention, flash penetration but no aspiration. Again recommend. Pt was able to upgrade to regular/thin diet by time of discharge from ARU. Unsure of what baseline diet is at pt's facility, not mentioned in paperwork sent over. Per clinical swallow evaluation on 4/5/22, recommendation for soft & bite-sized solids with mildly thick liquids. VFSS order placed d/t coughing with liquids on 4/6.    Pain Assessment   Patient Currently in Pain No   Type of Evaluation   Type of Evaluation Swallow Evaluation   General Swallowing Observations   Current Diet/Method of Nutritional Intake (General Swallowing Observations, NIS) NPO    Respiratory Support (General Swallowing Observations) nasal cannula  (1 lpm)   Swallowing Evaluation Videofluoroscopic swallow study (VFSS)   VFSS Evaluation   Radiologist Dr. Collins   Views Taken left lateral   Physical Location of Procedure Fluoroscopy Suite   VFSS Textures Trialed thin liquids;mildly thick liquids;pureed;soft & bite-sized   VFSS Eval: Thin Liquid Texture Trial   Mode of Presentation, Thin Liquid spoon;straw;fed by clinician   Order of Presentation 3, 4, 9   Preparatory Phase WFL   Oral Phase, Thin Liquid Premature pharyngeal entry   Pharyngeal Phase, Thin Liquid Delayed swallow reflex   Rosenbek's Penetration Aspiration Scale: Thin Liquid Trial Results 5 - contrast contacts vocal cords, visible residue remains (penetration)  (possible 6)   Diagnostic Statement Deep penetration to vocal cords with thin by straw cup. Pt with no response to penetration. Suspected trace aspiration event following thin by spoon which spontaneously cleared with throat clear.   VFSS Eval: Mildly Thick Liquids   Mode of Presentation spoon;straw;fed by clinician   Order of Presentation 1, 2, 6, 8   Preparatory Phase WFL   Oral Phase Premature pharyngeal entry   Pharyngeal Phase Delayed swallow reflex   Rosenbek's Penetration Aspiration Scale 3 - contrast remains above the vocal cords, visible residue remains (penetration)   Diagnostic Statement Minimal flash penetration with mildly thick by straw/spoon. A trace amount did not spontaneously clear from laryngeal vestibule, however this is deemed WFL.    VFSS Evaluation: Puree Solid Texture Trial   Mode of Presentation, Puree spoon;fed by clinician   Order of Presentation 5   Preparatory Phase WFL   Oral Phase, Puree Delayed AP movement   Pharyngeal Phase, Puree Delayed swallow reflex   Rosenbek's Penetration Aspiration Scale: Puree Food Trial Results 1 - no aspiration, contrast does not enter airway   Diagnostic Statement No penetration/aspiration.   VFSS Eval: Soft &  Bite Sized   Mode of Presentation fed by clinician   Order of presentation 7   Preparatory Phase Other (see comments)  (prolonged mastication)   Oral Phase Delayed AP movement;Effortful AP movement   Pharyngeal Phase Residue in valleculae  (minimal)   Rosenbek's Penetration Aspiration Scale 1 - no aspiration, contrast does not enter airway   Diagnostic Statement No penetration/aspiration or significant residuals.   Esophageal Phase of Swallow   Patient reports or presents with symptoms of esophageal dysphagia No   Swallowing Recommendations   Diet Consistency Recommendations soft & bite-sized (level 6);mildly thick liquids (level 2)   Supervision Level for Intake 1:1 supervision needed   Mode of Delivery Recommendations slow rate of intake;bolus size, small   Swallowing Maneuver Recommendations alternate food and liquid intake   Monitoring/Assistance Required (Eating/Swallowing) stop eating activities when fatigue is present   Recommended Feeding/Eating Techniques (Swallow Eval) maintain upright sitting position for eating;provide assist with feeding   Medication Administration Recommendations, Swallowing (SLP) whole with mildly thick liquids or puree   General Therapy Interventions   Planned Therapy Interventions Dysphagia Treatment   Dysphagia treatment Modified diet education;Instruction of safe swallow strategies;Compensatory strategies for swallowing   Clinical Impression   Criteria for Skilled Therapeutic Interventions Met (SLP Eval) Yes, treatment indicated   SLP Diagnosis Mild-moderate oropharyngeal dysphagia   Risks & Benefits of therapy have been explained evaluation/treatment results reviewed;care plan/treatment goals reviewed;risks/benefits reviewed;current/potential barriers reviewed   Clinical Impression Comments Video swallow evaluation completed per MD order. Pt presents with mild-moderate oropharyngeal dysphagia under fluoroscopy. Presence of anterior cervical hardware between C4-C7 which likely  contributes to chronic dysphagia. Oral phase significant for delayed A-P transit, prolonged mastication, and difficulty coordinating acceptance via straw inconsistently. Pharyngeal phase characterized by delayed swallow initiation across consistencies, premature spillage to pyriforms with liquids, mildly reduced pharyngeal constriction, and reduced laryngeal elevation which impacts airway protection. Pt's epiglottic inversion, base of tongue retraction, and UES coordination are grossly WFL.     As a result of the above pharyngeal deficits, thin liquids by spoon/straw resulted in deep laryngeal penetration before and during the swallow. The material migrated to the cords on all trials. Pt did respond with delayed weak throat clearing, however this was inconsistent in clearing material. Pt unable to follow cues to cough. Suspect minimal aspiration x1 following thin liquids by spoon during subsequent swallow, however this material cleared with throat clear. Flash penetration observed with mildly thick liquids, however this was deemed WFL. Swallow initiation and control was improved with mildly thick liquids with no signifcant aspiration concern. No penetration/aspiration with puree or soft solids. A chin tuck with thin liquids was trialed, however pt was unable to coordinate steps despite max cues.     Recommend continue diet of soft & bite-sized solids (6) and mildly thick liquids (2) with 1:1 assistance for feeding. Straws ok. Pt to sit upright with PO, take small bites/sips, alternate liquids/solids, and eat/drink slowly. SLP will follow for diet advancement and strategy education. Ok for SLP to advance to thin liquids at bedside pending tolerance as pt did respond to penetration with delayed throat clearing.     SLP Discharge Planning   SLP Discharge Recommendation Long term care facility   SLP Rationale for DC Rec Dysphagia is significantly below baseline   SLP Brief overview of current status  Recommend continue  diet of soft & bite-sized solids (6) and mildly thick liquids (2) with 1:1 assistance for feeding. Straws ok. Pt to sit upright with PO, take small bites/sips, alternate liquids/solids, and eat/drink slowly. SLP will follow for diet advancement and strategy education. Ok for SLP to advance to thin liquids at bedside pending tolerance as pt did respond to penetration with delayed throat clearing.    Total Evaluation Time   Total Evaluation Time (Minutes) 25   SLP Goals   Therapy Frequency (SLP Eval) 5 times/wk   SLP Predicated Duration/Target Date for Goal Attainment 04/12/22   SLP Goals Swallow   SLP: Safely tolerate diet without signs/symptoms of aspiration Regular diet;Thin liquids;With use of swallow precautions;Independently

## 2022-04-07 NOTE — PLAN OF CARE
"Goal Outcome Evaluation:  Vitals:/67 (BP Location: Right arm)   Pulse 76   Temp 98  F (36.7  C) (Oral)   Resp 22   Ht 1.6 m (5' 3\")   Wt 60.7 kg (133 lb 14.4 oz)   SpO2 96%   BMI 23.72 kg/m     Pain: Denies  Neuro: Disoriented to time.   Respiratory: diminished with fine crackles.   GI/: 200ml output this shift.   LDAs: R PIV infusing D5 with 1/2 NS with K.  Labs: K came back at 3.5. Recheck scheduled for am.   Diet: Pt was eating supper being fed by her daughter at a DD6 and choked. Pt daughter grabbed some chunks out and pt threw up and her airway cleared. MD notified and Dr Marie came up to see her. Per family request, her diet has been reduced to a DD5.   Activity: Pt bedbound. Total cares.   Plan: Continue to monitor per POC. Continue abx.                       "

## 2022-04-07 NOTE — PROGRESS NOTES
"0900: MD paged \"Temp was 102.3, Tylenol given and ice packed. Is going down for video swallow now.\" Thanks.   "

## 2022-04-08 ENCOUNTER — APPOINTMENT (OUTPATIENT)
Dept: CT IMAGING | Facility: CLINIC | Age: 77
DRG: 871 | End: 2022-04-08
Attending: STUDENT IN AN ORGANIZED HEALTH CARE EDUCATION/TRAINING PROGRAM
Payer: MEDICARE

## 2022-04-08 ENCOUNTER — APPOINTMENT (OUTPATIENT)
Dept: SPEECH THERAPY | Facility: CLINIC | Age: 77
DRG: 871 | End: 2022-04-08
Payer: MEDICARE

## 2022-04-08 ENCOUNTER — APPOINTMENT (OUTPATIENT)
Dept: OCCUPATIONAL THERAPY | Facility: CLINIC | Age: 77
DRG: 871 | End: 2022-04-08
Payer: MEDICARE

## 2022-04-08 LAB
ANION GAP SERPL CALCULATED.3IONS-SCNC: 6 MMOL/L (ref 3–14)
BUN SERPL-MCNC: 15 MG/DL (ref 7–30)
C PNEUM DNA SPEC QL NAA+PROBE: NOT DETECTED
CALCIUM SERPL-MCNC: 8.6 MG/DL (ref 8.5–10.1)
CHLORIDE BLD-SCNC: 108 MMOL/L (ref 94–109)
CO2 SERPL-SCNC: 27 MMOL/L (ref 20–32)
CREAT SERPL-MCNC: 1.13 MG/DL (ref 0.52–1.04)
ERYTHROCYTE [DISTWIDTH] IN BLOOD BY AUTOMATED COUNT: 11.9 % (ref 10–15)
FLUAV H1 2009 PAND RNA SPEC QL NAA+PROBE: NOT DETECTED
FLUAV H1 RNA SPEC QL NAA+PROBE: NOT DETECTED
FLUAV H3 RNA SPEC QL NAA+PROBE: NOT DETECTED
FLUAV RNA SPEC QL NAA+PROBE: NOT DETECTED
FLUBV RNA SPEC QL NAA+PROBE: NOT DETECTED
GFR SERPL CREATININE-BSD FRML MDRD: 50 ML/MIN/1.73M2
GLUCOSE BLD-MCNC: 114 MG/DL (ref 70–99)
HADV DNA SPEC QL NAA+PROBE: NOT DETECTED
HCOV PNL SPEC NAA+PROBE: NOT DETECTED
HCT VFR BLD AUTO: 25.4 % (ref 35–47)
HGB BLD-MCNC: 8.1 G/DL (ref 11.7–15.7)
HMPV RNA SPEC QL NAA+PROBE: NOT DETECTED
HPIV1 RNA SPEC QL NAA+PROBE: NOT DETECTED
HPIV2 RNA SPEC QL NAA+PROBE: NOT DETECTED
HPIV3 RNA SPEC QL NAA+PROBE: NOT DETECTED
HPIV4 RNA SPEC QL NAA+PROBE: NOT DETECTED
M PNEUMO DNA SPEC QL NAA+PROBE: NOT DETECTED
MAGNESIUM SERPL-MCNC: 1.9 MG/DL (ref 1.6–2.3)
MCH RBC QN AUTO: 31.8 PG (ref 26.5–33)
MCHC RBC AUTO-ENTMCNC: 31.9 G/DL (ref 31.5–36.5)
MCV RBC AUTO: 100 FL (ref 78–100)
PLATELET # BLD AUTO: 152 10E3/UL (ref 150–450)
POTASSIUM BLD-SCNC: 3.3 MMOL/L (ref 3.4–5.3)
POTASSIUM BLD-SCNC: 3.7 MMOL/L (ref 3.4–5.3)
RBC # BLD AUTO: 2.55 10E6/UL (ref 3.8–5.2)
RSV RNA SPEC QL NAA+PROBE: NOT DETECTED
RSV RNA SPEC QL NAA+PROBE: NOT DETECTED
RV+EV RNA SPEC QL NAA+PROBE: NOT DETECTED
SODIUM SERPL-SCNC: 141 MMOL/L (ref 133–144)
WBC # BLD AUTO: 4 10E3/UL (ref 4–11)

## 2022-04-08 PROCEDURE — 99222 1ST HOSP IP/OBS MODERATE 55: CPT | Performed by: INTERNAL MEDICINE

## 2022-04-08 PROCEDURE — 71250 CT THORAX DX C-: CPT | Mod: MG

## 2022-04-08 PROCEDURE — 250N000011 HC RX IP 250 OP 636: Performed by: INTERNAL MEDICINE

## 2022-04-08 PROCEDURE — 83735 ASSAY OF MAGNESIUM: CPT | Performed by: STUDENT IN AN ORGANIZED HEALTH CARE EDUCATION/TRAINING PROGRAM

## 2022-04-08 PROCEDURE — 99223 1ST HOSP IP/OBS HIGH 75: CPT | Performed by: NURSE PRACTITIONER

## 2022-04-08 PROCEDURE — 74177 CT ABD & PELVIS W/CONTRAST: CPT | Mod: ME

## 2022-04-08 PROCEDURE — 92526 ORAL FUNCTION THERAPY: CPT | Mod: GN

## 2022-04-08 PROCEDURE — 99233 SBSQ HOSP IP/OBS HIGH 50: CPT | Performed by: STUDENT IN AN ORGANIZED HEALTH CARE EDUCATION/TRAINING PROGRAM

## 2022-04-08 PROCEDURE — 250N000009 HC RX 250: Performed by: INTERNAL MEDICINE

## 2022-04-08 PROCEDURE — 84132 ASSAY OF SERUM POTASSIUM: CPT | Performed by: STUDENT IN AN ORGANIZED HEALTH CARE EDUCATION/TRAINING PROGRAM

## 2022-04-08 PROCEDURE — 250N000013 HC RX MED GY IP 250 OP 250 PS 637: Performed by: HOSPITALIST

## 2022-04-08 PROCEDURE — 80048 BASIC METABOLIC PNL TOTAL CA: CPT | Performed by: INTERNAL MEDICINE

## 2022-04-08 PROCEDURE — 87581 M.PNEUMON DNA AMP PROBE: CPT | Performed by: INTERNAL MEDICINE

## 2022-04-08 PROCEDURE — 120N000001 HC R&B MED SURG/OB

## 2022-04-08 PROCEDURE — 36415 COLL VENOUS BLD VENIPUNCTURE: CPT | Performed by: STUDENT IN AN ORGANIZED HEALTH CARE EDUCATION/TRAINING PROGRAM

## 2022-04-08 PROCEDURE — 97535 SELF CARE MNGMENT TRAINING: CPT | Mod: GO

## 2022-04-08 PROCEDURE — 250N000013 HC RX MED GY IP 250 OP 250 PS 637: Performed by: INTERNAL MEDICINE

## 2022-04-08 PROCEDURE — 85027 COMPLETE CBC AUTOMATED: CPT | Performed by: INTERNAL MEDICINE

## 2022-04-08 PROCEDURE — 36415 COLL VENOUS BLD VENIPUNCTURE: CPT | Performed by: INTERNAL MEDICINE

## 2022-04-08 PROCEDURE — 87449 NOS EACH ORGANISM AG IA: CPT | Performed by: INTERNAL MEDICINE

## 2022-04-08 RX ORDER — POTASSIUM CHLORIDE 1.5 G/1.58G
20 POWDER, FOR SOLUTION ORAL ONCE
Status: COMPLETED | OUTPATIENT
Start: 2022-04-08 | End: 2022-04-08

## 2022-04-08 RX ORDER — IOPAMIDOL 755 MG/ML
500 INJECTION, SOLUTION INTRAVASCULAR ONCE
Status: COMPLETED | OUTPATIENT
Start: 2022-04-08 | End: 2022-04-08

## 2022-04-08 RX ADMIN — BUSPIRONE HYDROCHLORIDE 30 MG: 15 TABLET ORAL at 08:36

## 2022-04-08 RX ADMIN — MIRTAZAPINE 15 MG: 15 TABLET, FILM COATED ORAL at 21:58

## 2022-04-08 RX ADMIN — QUETIAPINE FUMARATE 25 MG: 25 TABLET ORAL at 21:58

## 2022-04-08 RX ADMIN — THERA TABS 1 TABLET: TAB at 08:36

## 2022-04-08 RX ADMIN — PANTOPRAZOLE SODIUM 40 MG: 40 TABLET, DELAYED RELEASE ORAL at 16:55

## 2022-04-08 RX ADMIN — MIRABEGRON 25 MG: 25 TABLET, FILM COATED, EXTENDED RELEASE ORAL at 08:36

## 2022-04-08 RX ADMIN — METHYLPHENIDATE HYDROCHLORIDE 2.5 MG: 5 TABLET ORAL at 08:41

## 2022-04-08 RX ADMIN — POTASSIUM CHLORIDE 20 MEQ: 1.5 POWDER, FOR SOLUTION ORAL at 08:35

## 2022-04-08 RX ADMIN — PANTOPRAZOLE SODIUM 40 MG: 40 TABLET, DELAYED RELEASE ORAL at 08:36

## 2022-04-08 RX ADMIN — BUSPIRONE HYDROCHLORIDE 30 MG: 15 TABLET ORAL at 21:58

## 2022-04-08 RX ADMIN — LEVETIRACETAM 1000 MG: 500 TABLET, FILM COATED ORAL at 21:57

## 2022-04-08 RX ADMIN — DOXYCYCLINE 100 MG: 100 INJECTION, POWDER, LYOPHILIZED, FOR SOLUTION INTRAVENOUS at 17:05

## 2022-04-08 RX ADMIN — TAZOBACTAM SODIUM AND PIPERACILLIN SODIUM 4.5 G: 500; 4 INJECTION, SOLUTION INTRAVENOUS at 05:02

## 2022-04-08 RX ADMIN — FLUOXETINE 60 MG: 20 CAPSULE ORAL at 08:37

## 2022-04-08 RX ADMIN — LEVETIRACETAM 250 MG: 250 TABLET, FILM COATED ORAL at 08:37

## 2022-04-08 RX ADMIN — DOXYCYCLINE 100 MG: 100 INJECTION, POWDER, LYOPHILIZED, FOR SOLUTION INTRAVENOUS at 03:56

## 2022-04-08 RX ADMIN — OLANZAPINE 5 MG: 5 TABLET, FILM COATED ORAL at 21:58

## 2022-04-08 RX ADMIN — TAZOBACTAM SODIUM AND PIPERACILLIN SODIUM 4.5 G: 500; 4 INJECTION, SOLUTION INTRAVENOUS at 18:44

## 2022-04-08 RX ADMIN — RIVAROXABAN 15 MG: 15 TABLET, FILM COATED ORAL at 18:20

## 2022-04-08 RX ADMIN — TAZOBACTAM SODIUM AND PIPERACILLIN SODIUM 4.5 G: 500; 4 INJECTION, SOLUTION INTRAVENOUS at 12:14

## 2022-04-08 RX ADMIN — SODIUM CHLORIDE 59 ML: 9 INJECTION, SOLUTION INTRAVENOUS at 11:39

## 2022-04-08 RX ADMIN — IOPAMIDOL 75 ML: 755 INJECTION, SOLUTION INTRAVENOUS at 11:39

## 2022-04-08 RX ADMIN — LEVETIRACETAM 250 MG: 250 TABLET, FILM COATED ORAL at 21:58

## 2022-04-08 RX ADMIN — LEVETIRACETAM 1000 MG: 500 TABLET, FILM COATED ORAL at 08:36

## 2022-04-08 ASSESSMENT — ACTIVITIES OF DAILY LIVING (ADL)
ADLS_ACUITY_SCORE: 27
ADLS_ACUITY_SCORE: 27
ADLS_ACUITY_SCORE: 25
ADLS_ACUITY_SCORE: 27
ADLS_ACUITY_SCORE: 27
ADLS_ACUITY_SCORE: 25
ADLS_ACUITY_SCORE: 27
ADLS_ACUITY_SCORE: 25
ADLS_ACUITY_SCORE: 25
ADLS_ACUITY_SCORE: 27

## 2022-04-08 ASSESSMENT — ENCOUNTER SYMPTOMS
HEARTBURN: 0
MYALGIAS: 1
STRIDOR: 0
VOMITING: 0
WEIGHT LOSS: 0
HEMOPTYSIS: 0
SPUTUM PRODUCTION: 0
EYES NEGATIVE: 1
FEVER: 1
LOSS OF CONSCIOUSNESS: 0
COUGH: 0
NAUSEA: 0
CHILLS: 0
WHEEZING: 0
SHORTNESS OF BREATH: 1
SINUS PAIN: 0
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
## 2022-04-08 NOTE — PROGRESS NOTES
[facility] Progress Note    MS 3    Met with patient regarding consult request for emotional support.    Patient has been in discussion with palliative care team.      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    She is grateful for the loving relationships and good things in her life; and happy memories.  She is thankful that she is not experiencing pain.    She loved the prayer shawl I brought her, admiring the colors and handiwork.    She thanked me for sitting with her and listening to her story.  She made apologies for her word-finding deficits.  I complimented her intellect and well-spoken manner.    [facility] remains available upon request.      Rev. Sara Collado M.Div.  Staff   Phone  [phone]
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
## 2022-04-08 NOTE — CONSULTS
RN CC was currently following the pt.  Please see the RN CC's note from 4/5 for the initial assessment/consult note.    Care Management Follow Up    Length of Stay (days): 4    Expected Discharge Date: 04/12/2022     Concerns to be Addressed: care coordination/care conferences, discharge planning     Patient plan of care discussed at interdisciplinary rounds: Yes    Anticipated Discharge Disposition: Assisted Living - [facility]     Anticipated Discharge Services: None  Anticipated Discharge DME: None    Patient/family educated on Medicare website which has current facility and service quality ratings: no - family already knew which hospice agency they wanted to use.  Education Provided on the Discharge Plan:  yes  Patient/Family in Agreement with the Plan: yes    Referrals Placed by CM/CAMRON: External Care Coordination  Private pay costs discussed: transportation costs    Additional Information:  Camron was updated by Lisha with the Pain and Palliative Team, that the pt and her family would like the pt to return to [facility] with [facility].  The pt's dtr La Nena is the point of contact.    Camron spoke with Yudy at [facility], [phone], to update her that the pt will discharge on hospice.  Yudy is agreeable with this plan and there are no barriers to the pt's return as long as the hospice equipment is ordered and delivered prior to the pt's return.  Camron told Yudy that a commode, w/c, teresa lift with sling, O2, and bed.  Camron told Yudy, that after talking with the Palliative Care Team, the pt will likely be ready for discharge on Tuesday.  Camron told Yudy that they will follow-up with her on Monday to finalize the pt's discharge plan.    Camron left a vm with the pt's dtr La Nena with the update on the discharge of Tuesday with [facility] and the equipment that will be ordered.  Camron told her that they will discuss transportation needs as well.    Camron called [facility] and spoke with Char SMITH:  [phone] F: [phone], to make the initial referral.  Sw faxed the requested documentation for the referral.  Sw requested the admission on Tuesday that they can accommodate.  Carmen told them that the pt will need to be back to the facility by about 1200 on Tuesday.  Carmen will follow-up with [facility] on Monday.    Sw will continue with discharge planning and will be available as needed until discharge.    MACARIO Perez, [facility]  Inpatient Care Coordination  [facility]  [phone]

## 2022-04-08 NOTE — PROGRESS NOTES
[facility]    Medicine Progress Note - Hospitalist Service    Date of Admission:  4/4/2022    Assessment & Plan     Olga Bailey is a 76 year old female with history of glioblastoma multiforme of the left parietal region who underwent craniotomy and complete resection in March 2021 as well as radiation and chemotherapy, PJP pneumonia, TRALI, seizure disorder on Keppra, depressive disorder, and uncomplicated asthma. She is currently residing in a nursing home and was brought to the [facility] emergency department on 4/4/22 for evaluation for generalized weakness.       Patient stated that she had generalized weakness which had gradually gotten worse.  She had some right-sided weakness.  She also states that her weakness also shifted to the left side.  She had no cough or shortness of breath.  She denied fever, vomiting, diarrhea, and urinary symptoms.  She had significantly decreased oral intake.      EMS was called and she was noted to be hypoxic with oxygen saturation 88%.  She was given neb treatment.  She was brought to emergency room for evaluation.  On arrival to emergency room, her vital signs showed  temperature 99.9, pulse 94, blood pressure 113/74, oxygen saturation 97% on 3 L.  Laboratory work-up showed sodium 139, potassium 3.2, creatinine 1.42, , lactic acid 0.9, WBC 5.8, hemoglobin 9.7.  Procalcitonin 0.26.  Chest x-ray showed mild alveolar infiltrate in the left midlung.  MRI of the brain showed no evidence of acute ischemia or hemorrhage.  Stable postoperative change in the left side craniotomy and tumor resection.  MRA of the brain/neck negative for high-grade stenosis.     She was admitted to the hospital for further management including IV fluids and IV antibiotics for pneumonia.  Neurology was contacted by phone  given unremarkable MRI/MRA was not thought to have had a stroke.  Ceftriaxone and doxycycline were started.  IV fluids were started.  Olga was admitted to the hospital for further cares.  After admission she developed high fevers.  She remained lethargic and developed worsening confusion.  Some concern about aspiration developed so she was seen by speech therapy.  Modified diet was ordered.  Ceftriaxone was changed to Zosyn to cover aspiration.  Video swallow study was performed and modified diet was continued.      Problem list:     Sepsis (fever and tachycardia)  Community acquired pneumonia versus aspiration pneumonia (favor aspiration pneumonia)  Acute hypoxic respiratory failure  -Presented with weakness and poor oral intake  -Emergency department evaluation showed hypoxia and low-grade fever.  Procalcitonin was slightly elevated but white blood cell count and lactic acid were normal at the time of presentation.  Was admitted and treated with Rocephin and doxycycline.  -Developed higher fever with tachycardia overnight on 4/5/2022  -Ceftriaxone was changed to Zosyn to cover possible aspiration pneumonia (dysphagia noted during this hospitalization, see below)  -Seems to be improving now with improved mental status.  Still having intermittent fevers.  -CT scan shows interstitial pneumonia on top of pre-existing interstitial fibrosis.  -Pulmonary consulted, doubt that this is recurrence of PCP pneumonia.  The ordered Fungitell, sputum culture and respiratory viral panel.     Dysphagia  -Speech therapy consult appreciated  -Video swallow study done and showed penetration but no aspiration.  -Modified diet ordered  -Continue Zosyn for suspected aspiration pneumonia     Hypokalemia  -Replace per protocol     History of glioblastoma multiforme in the left parietal region status post complete resection in March 2021  -Follows with [facility].  Is on radiation 5/2021.  Started adjuvant therapy with temozolomide which was complicated by hematologic toxicity so changed to metronomic/daily dosing and 7/2021 which was tolerated and  showed positive treatment effects.  She has completed 6 months of adjuvant temozolomide as of 12/2021.  Follow-up imaging 2/2022 without concern and plan to remain off treatment and on imaging surveillance moving forward.     History of seizure disorder  -On Keppra, resumed.     Chronic kidney disease  -Baseline creatinine around 1.3-1.45.  Creatinine at baseline  -Decrease IV fluids to 50 mL/h and stop once eating and drinking well     Depression  Deconditioning  Poor functional status  -Continue prior to admission BuSpar, fluoxetine, Ritalin, olanzapine, and Seroquel.  She is followed by PMR and palliative care as an outpatient.  She has been noted to have declined in her functional status due to poorly controlled depression and lack of inspiration/motivation with chronic fatigue and neurological deficits.   -PT and OT consulted.  PT deferring care given current status  -Palliative consult appreciated.  Patient is now DNR/DNI and wishes to enroll in hospice at time of discharge to avoid future hospitalizations.  Does not want aggressive work-up but continue noninvasive work-up and treatment.  -Daughter expressed concern about all of this patient's psychotropic medications, decreased Seroquel from 50 mg at night to 25 mg at night.  If this reduction as tolerated consider stopping Seroquel altogether in a couple of days.     History of bilateral DVT and retroperitoneal hemorrhage while on anticoagulation now with IVC filter   -Found to have bilateral DVT during hospitalization last year and started on enoxaparin which was complicated by right-sided retroperitoneal hemorrhage requiring transfusion support.  She is now on anticoagulation with Xarelto.     Acute on chronic anemia  Pancytopenia  -Hemoglobin was down to 8.0 but then candelaria to 8.5.  Baseline hemoglobin appears to be about 9.5-10.5.  No reports of bleeding per nursing.    -Resume prior to admission Xarelto (blood cells with low hemoglobin)  -CBC in  "morning  -Hemoglobin remained stable, CT on 4/8 does not show any evidence of retroperitoneal hematoma.  Some concern of pyelonephritis based on imaging.  Will check UA.          Diet: Snacks/Supplements Adult: Ensure Enlive; With Meals  Minced & Moist Diet (level 5) Mildly Thick (level 2)    DVT Prophylaxis: DOAC  Martino Catheter: Not present  Central Lines: None  Cardiac Monitoring: None  Code Status: No CPR- Do NOT Intubate      Disposition Plan   Expected Discharge: 3 to 5 days  Anticipated discharge location: assisted living  with home care versus long-term care         The patient's care was discussed with the Bedside Nurse, Patient and Patient's Family.    Buddy Ledbetter MD  Hospitalist Service  [facility]  Securely message with the Vocera Web Console (learn more here)  Text page via [employer] Paging/Directory         Clinically Significant Risk Factors Present on Admission             # Overweight: Estimated body mass index is 26.8 kg/m  as calculated from the following:    Height as of this encounter: 1.6 m (5' 3\").    Weight as of this encounter: 68.6 kg (151 lb 4.8 oz).  # Severe Malnutrition: based on nutrition assessment     ______________________________________________________________________    Interval History   Patient had a choking episode yesterday.  Was seen by speech therapy again today and continued on soft and by his side solids with mildly thick liquids.    Data reviewed today: I reviewed all medications, new labs and imaging results over the last 24 hours.    Physical Exam   Vital Signs: Temp: 98.3  F (36.8  C) Temp src: Axillary BP: (!) 149/93 Pulse: 98   Resp: 16 SpO2: 93 % O2 Device: Nasal cannula Oxygen Delivery: 1 LPM  Weight: 151 lbs 4.8 oz  GENERAL:  Comfortable. Cooperative.  PSYCH: pleasant, oriented to person and roughly to place, No acute distress.  EYES: PERRLA, Normal conjunctiva.  HEART:  Regular rate and rhythm. No JVD. Pulses normal. No edema.  LUNGS: " Bilateral crackles, left more than right.    ABDOMEN:  Soft, no hepatosplenomegaly, normal bowel sounds.  EXTREMETIES: No clubbing, cyanosis or ischemia  SKIN:  Dry to touch, No rash.      Data   Recent Labs   Lab 04/08/22  0657 04/07/22  1505 04/07/22  0641 04/06/22  0643 04/05/22  1132   WBC 4.0  --   --  4.3 2.8*   HGB 8.1*  --   --  8.5* 8.3*  8.3*     --   --  103* 105*     --   --  150 126*     --  142 143  --    POTASSIUM 3.3* 3.5 3.3*  3.2* 3.5 3.8   CHLORIDE 108  --  110* 111*  --    CO2 27  --  26 28  --    BUN 15  --  14 21  --    CR 1.13*  --  1.00 1.11*  --    ANIONGAP 6  --  6 4  --    ESTIVEN 8.6  --  8.8 8.8  --    *  --  120* 101*  --

## 2022-04-08 NOTE — PLAN OF CARE
Goal Outcome Evaluation:           Patient VSS, denies pain, drowsy and slept for majority of NOC, No acute needs reported, suction set up at bedside, patient weight shift through NOC will cont to monitor

## 2022-04-08 NOTE — PROGRESS NOTES
"BP (!) 149/93 (BP Location: Right arm)   Pulse 98   Temp 98.3  F (36.8  C) (Axillary)   Resp 16   Ht 1.6 m (5' 3\")   Wt 68.6 kg (151 lb 4.8 oz)   SpO2 93%   BMI 26.80 kg/m      Pt AOx2, reorientation to day and location needed at times. LS diminished with fine crackles on left side. 1L O2 NC. 600ml of urine output this shift. Diet minced and moist, speech requests that staff make sure each bite is moist to reduce possible aspiration. Order extra ketchup, gravy, etc to help. Chest and abdominal CT completed this AM. Findings show possible atypical pneumonia, possibly superimposed on mild pulmonary fibrosis. Pulmonary consulted and does not think pneumonia is her current problem.Pt talked with palliative care today and changed code status to DNR/DNI. Plan is to return to facility on hospice. Will continue plan of care.   "

## 2022-04-08 NOTE — PROVIDER NOTIFICATION
Provider notified at 1826 about pt choking episode. Dr. Willoughby came to see pt. Discussed switching from DD6 to DD5 per pt daughter request.

## 2022-04-09 ENCOUNTER — APPOINTMENT (OUTPATIENT)
Dept: SPEECH THERAPY | Facility: CLINIC | Age: 77
DRG: 871 | End: 2022-04-09
Payer: MEDICARE

## 2022-04-09 LAB
ALBUMIN UR-MCNC: 30 MG/DL
APPEARANCE UR: CLEAR
BILIRUB UR QL STRIP: NEGATIVE
COLOR UR AUTO: YELLOW
CREAT SERPL-MCNC: 1 MG/DL (ref 0.52–1.04)
GFR SERPL CREATININE-BSD FRML MDRD: 58 ML/MIN/1.73M2
GLUCOSE UR STRIP-MCNC: NEGATIVE MG/DL
HGB UR QL STRIP: NEGATIVE
KETONES UR STRIP-MCNC: NEGATIVE MG/DL
LEUKOCYTE ESTERASE UR QL STRIP: NEGATIVE
NITRATE UR QL: NEGATIVE
PH UR STRIP: 5.5 [PH] (ref 5–7)
POTASSIUM BLD-SCNC: 3.4 MMOL/L (ref 3.4–5.3)
POTASSIUM BLD-SCNC: 3.5 MMOL/L (ref 3.4–5.3)
RBC URINE: 3 /HPF
SP GR UR STRIP: 1.02 (ref 1–1.03)
SQUAMOUS EPITHELIAL: <1 /HPF
UROBILINOGEN UR STRIP-MCNC: NORMAL MG/DL
WBC URINE: 2 /HPF

## 2022-04-09 PROCEDURE — 82565 ASSAY OF CREATININE: CPT | Performed by: INTERNAL MEDICINE

## 2022-04-09 PROCEDURE — 120N000001 HC R&B MED SURG/OB

## 2022-04-09 PROCEDURE — 250N000013 HC RX MED GY IP 250 OP 250 PS 637: Performed by: INTERNAL MEDICINE

## 2022-04-09 PROCEDURE — 36415 COLL VENOUS BLD VENIPUNCTURE: CPT | Performed by: INTERNAL MEDICINE

## 2022-04-09 PROCEDURE — 84132 ASSAY OF SERUM POTASSIUM: CPT | Performed by: STUDENT IN AN ORGANIZED HEALTH CARE EDUCATION/TRAINING PROGRAM

## 2022-04-09 PROCEDURE — 250N000011 HC RX IP 250 OP 636: Performed by: INTERNAL MEDICINE

## 2022-04-09 PROCEDURE — 250N000013 HC RX MED GY IP 250 OP 250 PS 637: Performed by: HOSPITALIST

## 2022-04-09 PROCEDURE — 99232 SBSQ HOSP IP/OBS MODERATE 35: CPT | Performed by: INTERNAL MEDICINE

## 2022-04-09 PROCEDURE — 84132 ASSAY OF SERUM POTASSIUM: CPT | Performed by: INTERNAL MEDICINE

## 2022-04-09 PROCEDURE — 81001 URINALYSIS AUTO W/SCOPE: CPT | Performed by: STUDENT IN AN ORGANIZED HEALTH CARE EDUCATION/TRAINING PROGRAM

## 2022-04-09 PROCEDURE — 92526 ORAL FUNCTION THERAPY: CPT | Mod: GN | Performed by: SPEECH-LANGUAGE PATHOLOGIST

## 2022-04-09 PROCEDURE — 258N000003 HC RX IP 258 OP 636: Performed by: INTERNAL MEDICINE

## 2022-04-09 RX ORDER — OLANZAPINE 2.5 MG/1
2.5 TABLET, FILM COATED ORAL
Status: DISCONTINUED | OUTPATIENT
Start: 2022-04-09 | End: 2022-04-17

## 2022-04-09 RX ORDER — PIPERACILLIN SODIUM, TAZOBACTAM SODIUM 3; .375 G/15ML; G/15ML
3.38 INJECTION, POWDER, LYOPHILIZED, FOR SOLUTION INTRAVENOUS EVERY 6 HOURS
Status: DISCONTINUED | OUTPATIENT
Start: 2022-04-09 | End: 2022-04-18 | Stop reason: HOSPADM

## 2022-04-09 RX ORDER — POTASSIUM CHLORIDE 1.5 G/1.58G
20 POWDER, FOR SOLUTION ORAL ONCE
Status: COMPLETED | OUTPATIENT
Start: 2022-04-09 | End: 2022-04-09

## 2022-04-09 RX ORDER — AMLODIPINE BESYLATE 5 MG/1
5 TABLET ORAL DAILY
Status: DISCONTINUED | OUTPATIENT
Start: 2022-04-09 | End: 2022-04-18 | Stop reason: HOSPADM

## 2022-04-09 RX ADMIN — POTASSIUM CHLORIDE, DEXTROSE MONOHYDRATE AND SODIUM CHLORIDE: 150; 5; 450 INJECTION, SOLUTION INTRAVENOUS at 18:00

## 2022-04-09 RX ADMIN — TAZOBACTAM SODIUM AND PIPERACILLIN SODIUM 4.5 G: 500; 4 INJECTION, SOLUTION INTRAVENOUS at 06:32

## 2022-04-09 RX ADMIN — THERA TABS 1 TABLET: TAB at 09:55

## 2022-04-09 RX ADMIN — LEVETIRACETAM 1000 MG: 500 TABLET, FILM COATED ORAL at 21:47

## 2022-04-09 RX ADMIN — TAZOBACTAM SODIUM AND PIPERACILLIN SODIUM 4.5 G: 500; 4 INJECTION, SOLUTION INTRAVENOUS at 12:04

## 2022-04-09 RX ADMIN — AMLODIPINE BESYLATE 5 MG: 5 TABLET ORAL at 13:48

## 2022-04-09 RX ADMIN — BUSPIRONE HYDROCHLORIDE 30 MG: 15 TABLET ORAL at 21:47

## 2022-04-09 RX ADMIN — RIVAROXABAN 15 MG: 15 TABLET, FILM COATED ORAL at 17:05

## 2022-04-09 RX ADMIN — MIRTAZAPINE 15 MG: 15 TABLET, FILM COATED ORAL at 21:47

## 2022-04-09 RX ADMIN — BUSPIRONE HYDROCHLORIDE 30 MG: 15 TABLET ORAL at 09:55

## 2022-04-09 RX ADMIN — PIPERACILLIN AND TAZOBACTAM 3.38 G: 3; .375 INJECTION, POWDER, LYOPHILIZED, FOR SOLUTION INTRAVENOUS at 19:39

## 2022-04-09 RX ADMIN — LEVETIRACETAM 1000 MG: 500 TABLET, FILM COATED ORAL at 10:04

## 2022-04-09 RX ADMIN — PANTOPRAZOLE SODIUM 40 MG: 40 TABLET, DELAYED RELEASE ORAL at 09:55

## 2022-04-09 RX ADMIN — DOXYCYCLINE 100 MG: 100 INJECTION, POWDER, LYOPHILIZED, FOR SOLUTION INTRAVENOUS at 03:42

## 2022-04-09 RX ADMIN — MIRABEGRON 25 MG: 25 TABLET, FILM COATED, EXTENDED RELEASE ORAL at 09:55

## 2022-04-09 RX ADMIN — LEVETIRACETAM 250 MG: 250 TABLET, FILM COATED ORAL at 10:05

## 2022-04-09 RX ADMIN — TAZOBACTAM SODIUM AND PIPERACILLIN SODIUM 4.5 G: 500; 4 INJECTION, SOLUTION INTRAVENOUS at 00:50

## 2022-04-09 RX ADMIN — POTASSIUM CHLORIDE 20 MEQ: 1.5 POWDER, FOR SOLUTION ORAL at 09:56

## 2022-04-09 RX ADMIN — FLUOXETINE 60 MG: 20 CAPSULE ORAL at 09:55

## 2022-04-09 RX ADMIN — QUETIAPINE FUMARATE 25 MG: 25 TABLET ORAL at 21:47

## 2022-04-09 RX ADMIN — PANTOPRAZOLE SODIUM 40 MG: 40 TABLET, DELAYED RELEASE ORAL at 17:04

## 2022-04-09 RX ADMIN — METHYLPHENIDATE HYDROCHLORIDE 2.5 MG: 5 TABLET ORAL at 09:55

## 2022-04-09 RX ADMIN — LEVETIRACETAM 250 MG: 250 TABLET, FILM COATED ORAL at 21:48

## 2022-04-09 ASSESSMENT — ACTIVITIES OF DAILY LIVING (ADL)
ADLS_ACUITY_SCORE: 37
ADLS_ACUITY_SCORE: 27
ADLS_ACUITY_SCORE: 35
ADLS_ACUITY_SCORE: 35
ADLS_ACUITY_SCORE: 33
ADLS_ACUITY_SCORE: 37
ADLS_ACUITY_SCORE: 35
ADLS_ACUITY_SCORE: 37
ADLS_ACUITY_SCORE: 35
ADLS_ACUITY_SCORE: 37
ADLS_ACUITY_SCORE: 35
ADLS_ACUITY_SCORE: 35
ADLS_ACUITY_SCORE: 37
ADLS_ACUITY_SCORE: 33
ADLS_ACUITY_SCORE: 33
ADLS_ACUITY_SCORE: 27
ADLS_ACUITY_SCORE: 37
ADLS_ACUITY_SCORE: 37
ADLS_ACUITY_SCORE: 35
ADLS_ACUITY_SCORE: 37
ADLS_ACUITY_SCORE: 33
ADLS_ACUITY_SCORE: 33

## 2022-04-09 NOTE — PLAN OF CARE
Pt somnolent but arouses to voice and repeated stimuli during the night, increasing alertness this AM. VSS on 1L O2 per NC. Denied pain. No reported CP. PIV to left intact, SL. Purewick in place. Bladder scanned d/t no void, straight cath'd for 600 ml (sterile technique maintained), UA collected and sent to lab.  Turn q2h. Sacral mepi changed, incontinent of one small BM. Seizure precautions initiated and maintained. Potential discharge 4/12 home on hospice, [facility]. Will continue POC.     Goal Outcome Evaluation:    Plan of Care Reviewed With: patient     Overall Patient Progress: no change

## 2022-04-09 NOTE — PLAN OF CARE
Goal Outcome Evaluation:    Code status changed from DNR/DNI after Daughter Laness & relative Sheridan discussion w/Dr. Vo. The patient is now FULL CODE. SW re-consulted for discharge TCU planning. Lungs base crackles, diminished, IV Zosyn. Voids w/purewick, yellow 300ml this shift  Two soft brown stools. Foam to sacral area for prevention - no breakdown noted. Needs assistance with all feeding/drinking per ST recommendations. Aspiration precautions - no straws. K+ replaced, recheck at 1600.

## 2022-04-09 NOTE — PROGRESS NOTES
[facility]  Hospitalist Progress Note  Admit 4/4/2022  7:30 PM    Name: Olga Bailey    MRN: [id number]  Provider:  Lg Vo MD, [facility]    Date of Service: 04/09/2022     Reason for Stay (Diagnosis): Acute hypoxic respiratory failure and severe sepsis secondary to pneumonia         Summary of hospital stay & Assessment/Plan:   Summary of Stay: Olga Bailey is a 76 year old female who was admitted on 4/4/2022    76 year old female with history of glioblastoma multiforme of the left parietal region who underwent craniotomy and complete resection in March 2021 as well as radiation and chemotherapy, PJP pneumonia, TRALI, seizure disorder on Keppra, depressive disorder, and uncomplicated asthma. She is currently residing in a nursing home and was brought to the [facility] emergency department on 4/4/22 for evaluation for generalized weakness.    EMS was called and she was noted to be hypoxic with oxygen saturation 88%.  She was given neb treatment.  She was brought to emergency room for evaluation.  On arrival to emergency room, her vital signs showed  temperature 99.9, pulse 94, blood pressure 113/74, oxygen saturation 97% on 3 L. She was admitted to the hospital for further management including IV fluids and IV antibiotics for pneumonia.  Neurology was contacted by phone  given unremarkable MRI/MRA was not thought to have had a stroke.  Ceftriaxone and doxycycline were started.  IV fluids were started. Olga was admitted to the hospital for further cares.  After admission she developed high fevers.  She remained lethargic and developed worsening confusion.  Some concern about aspiration developed so she was seen by speech therapy.       Problem List:   1. Acute hypoxic respiratory failure secondary to pneumonia suspected aspiration and metabolic toxic encephalopathy  Continue Zosyn seems to be improving although had fever last night afebrile this morning  CT scan shows  interstitial pneumonia on top of pre-existing interstitial fibrosis.  Pulmonary consulted, doubt that this is recurrence of PCP pneumonia.  The ordered Fungitell, sputum culture and respiratory viral panel, follow up on results.  Patient is on multiple medication for delirium and psychotropic agent prior to admission we are trying de-escalate, Seroquel dose was decreased from 50-20 5 at night we will continue olanzapine as prn for now, note that she is also on Prozac, Remeron, and trazodone as needed for sleep which will stop  In and out seems to be reasonable and balanced for now with resumption of oral intake consider resuming home dose of Lasix  2. Severe sepsis secondary to aspiration pneumonia  3. History of dysphagia-speech therapy following  4. History of glioblastoma multiforme he with left parietal region involvement status post complete resection in March 2021  Follows with [facility].  Is on radiation 5/2021.  Started adjuvant therapy with temozolomide which was complicated by hematologic toxicity so changed to metronomic/daily dosing and 7/2021 which was tolerated and showed positive treatment effects.  She has completed 6 months of adjuvant temozolomide as of 12/2021.  Follow-up imaging 2/2022 without concern and plan to remain off treatment and on imaging surveillance moving forward.  5. History of seizure disorder, continue Keppra  6. Chronic kidney disease with baseline creatinine 1.3-1.45  7. Depression and poor functional status  8. History of bilateral DVT and retroperitoneal hemorrhage while on anticoagulation now with IVC filter and on Xarelto  9. Acute on chronic anemia-Pancytopenia  -Hemoglobin was down to 8.0 but then candelaria to 8.5.  Baseline hemoglobin   -Hemoglobin remained stable, CT on 4/8 does not show any evidence of retroperitoneal hematoma.         DVT Prophylaxis: DOAC  Code Status:  Full Code  I met with the patient daughter and daughter-in-law, her daughter was clear she  wants her to be full code for now there is ongoing discussion with the patient and her family on her goals and if she wants to be rehospitalized and for now the goal is to have restorative care and not hospice/comfort care.  Daughter wants CODE STATUS to be discussed with her and her brother prior to any change in the future they have joint power of  for healthcare directives.  We will reconsult social service to discuss discharge planning likely will need TCU    Disposition Plan     Expected discharge in 3  days to TCU once patient oral intake improves is afebrile for 24 hours     Entered: Lg Vo 04/09/2022, 9:00 AM                 Interval History:       Doing okay denies complaints that are new  Today's plan detailed above discussed with nursing               Physical Exam:   Physical Exam   Temp: 99.6  F (37.6  C) Temp src: Skin BP: (!) 150/81 Pulse: 119   Resp: 16 SpO2: 97 % O2 Device: Nasal cannula Oxygen Delivery: 1 LPM  Vitals:    04/05/22 0100 04/08/22 0600 04/09/22 0503   Weight: 60.7 kg (133 lb 14.4 oz) 68.6 kg (151 lb 4.8 oz) 70 kg (154 lb 6.4 oz)     I/O last 3 completed shifts:  In: 1590 [P.O.:590; I.V.:1000]  Out: 1900 [Urine:1900]          GENERAL:  Comfortable.   PSYCH: pleasant, oriented, No acute distress.  EYES: PERRLA, Normal conjunctiva.  HEART:  Normal S1, S2 with no edema.  LUNGS:  Clear to auscultation, normal Respiratory effort.  ABDOMEN:  Soft, no hepatosplenomegaly, normal bowel sounds.  SKIN:  Dry to touch, No rash.  Neuro: Non focal with normal motor power, sensation, CN's and Reflexes.    Medications     dextrose 5% and 0.45% NaCl + KCl 20 mEq/L Stopped (04/08/22 1437)     - MEDICATION INSTRUCTIONS -         busPIRone HCl  30 mg Oral BID     doxycycline (VIBRAMYCIN) IV  100 mg Intravenous Q12H     FLUoxetine  60 mg Oral Daily     levETIRAcetam  1,000 mg Oral BID     levETIRAcetam  250 mg Oral BID     methylphenidate  2.5 mg Oral QAM AC     mirabegron  25 mg Oral  Daily     mirtazapine  15 mg Oral At Bedtime     multivitamin, therapeutic  1 tablet Oral Daily     OLANZapine  5 mg Oral At Bedtime     pantoprazole  40 mg Oral BID AC     piperacillin-tazobactam  4.5 g Intravenous Q6H     potassium chloride  20 mEq Oral or Feeding Tube Once     QUEtiapine  25 mg Oral At Bedtime     rivaroxaban ANTICOAGULANT  15 mg Oral Daily with supper     sodium chloride (PF)  3 mL Intracatheter Q8H     Data     -Data reviewed today:  I personally reviewed  all new labs and imaging results over the last 24 hours.    Recent Labs   Lab 04/08/22  0657 04/06/22  0643 04/05/22  1132   WBC 4.0 4.3 2.8*   HGB 8.1* 8.5* 8.3*  8.3*   HCT 25.4* 27.9* 27.4*    103* 105*    150 126*     Recent Labs   Lab 04/09/22  0718 04/08/22  1618 04/08/22  0657 04/07/22  1505 04/07/22  0641 04/06/22  0643   NA  --   --  141  --  142 143   POTASSIUM 3.4 3.7 3.3*   < > 3.3*  3.2* 3.5   CHLORIDE  --   --  108  --  110* 111*   CO2  --   --  27  --  26 28   ANIONGAP  --   --  6  --  6 4   GLC  --   --  114*  --  120* 101*   BUN  --   --  15  --  14 21   CR 1.00  --  1.13*  --  1.00 1.11*   GFRESTIMATED 58*  --  50*  --  58* 51*   ESTIVEN  --   --  8.6  --  8.8 8.8    < > = values in this interval not displayed.       Recent Results (from the past 24 hour(s))   CT Chest Hi-Resolution wo Contrast    Narrative    CT CHEST HI-RESOLUTION WO CONTRAST 4/8/2022 11:47 AM    CLINICAL HISTORY: Suspected ILD, previous PCP pneumonia.  TECHNIQUE: High resolution images were obtained through the chest  during inspiration with select expiratory views. Prone imaging was  performed. Multiplanar reformats were obtained. Dose reduction  techniques were used.    CONTRAST: None.    COMPARISON: 9/10/2021    FINDINGS:   LUNGS AND PLEURA: Since 9/10/2021, extensive bilateral linear,  groundglass and patchy opacities have increased. These are most  extensive in the upper lobes bilaterally with mild opacities in the  lower lobes, and right  middle lobe lung bases. Stable mild traction  bronchiectasis. No honeycombing or air trapping. No bullae or  pneumothorax. No pleural effusion. No discrete masses.    MEDIASTINUM/AXILLAE: Surgical clips in the right lower neck, partly  visualized. No lymphadenopathy. No thoracic aortic aneurysm. Mild  coronary artery calcifications. No pericardial effusion. Small hiatal  hernia.    UPPER ABDOMEN: See separate report of the CT of the abdomen and  pelvis.    MUSCULOSKELETAL: Instrumented fusion of the cervical spine. Mild  degenerative changes in the spine. No suspicious lesions in the bones.      Impression    IMPRESSION:   1.  Increased extensive linear, groundglass and patchy opacities in  both lungs. This is likely due to atypical pneumonia, which may be  superimposed on underlying mild pulmonary fibrosis.    ELIANA PALACIOS MD         SYSTEM ID:  [id number]   CT Abdomen Pelvis w Contrast    Narrative    CT ABDOMEN PELVIS W CONTRAST 4/8/2022 12:04 PM    CLINICAL HISTORY: Abdominal pain, acute, nonlocalized    TECHNIQUE: CT scan of the abdomen and pelvis was performed following  injection of IV contrast. Multiplanar reformats were obtained. Dose  reduction techniques were used.  CONTRAST: 75mL [id number]    COMPARISON: 5/26/2021    FINDINGS:   LOWER CHEST: See separate report of chest CT today    HEPATOBILIARY: No focal lesions of the liver. Distended gallbladder  without calcified gallstones. Mild central intrahepatic biliary ductal  dilatation and extrahepatic biliary duct dilatation up to 9 mm with  smooth distal tapering. This is stable, previously measuring 9 mm.    PANCREAS: No significant mass, duct dilatation, or inflammatory  change.    SPLEEN: Normal.    ADRENAL GLANDS: Normal.    KIDNEYS/BLADDER: No hydronephrosis, calculi or suspicious renal  masses. Mild nonspecific perinephric stranding around the lower pole  of both kidneys.    BOWEL: Large amount of formed stool in the rectum. Mild  colonic  diverticulosis. Residual contrast in the colon from the speech study 2  days ago. No small bowel or colonic obstruction. No inflammatory  changes. Nothing for appendicitis.    PELVIC ORGANS: Hysterectomy.    ADDITIONAL FINDINGS: Previously seen large right retroperitoneal  hematoma has resolved with mild linear stranding in the right  posterior retroperitoneum. Mild nonspecific presacral soft tissue  thickening. No free fluid or fluid collections. No abdominal aortic  aneurysm. IVC filter.    MUSCULOSKELETAL: Mild degenerative changes in the spine. No suspicious  lesions in the bones.      Impression    IMPRESSION:   1.  Mild nonspecific stranding around the lower poles of the kidneys.  Correlate clinically for pyelonephritis.  2.  Resolved large right retroperitoneal hematoma.  3.  Stable mild central intrahepatic and extrahepatic biliary ductal  dilatation. No calcified gallstones or obstructing masses.    ELIANA PALACIOS MD         SYSTEM ID:  [id number]       This document was produced using voice recognition software

## 2022-04-09 NOTE — CONSULTS
SW aware of consult related to discharge planning. CAMRON completed initial consult on 4/8/22 & continues to follow for discharge planning.     DAYNA Hernandez  [facility]  4/9/2022  2:00 PM

## 2022-04-09 NOTE — PROGRESS NOTES
A&OX2. Disoriented to time and situation. VSS T max 101.2 and down to 99.7 upon reassessment. Oxygen 95% on 1L NC. Poor appetite. Turn and repositioned. purwick intact, at first was not producing out put. Bladder scanned for 409 ml.  Paged and got order to straight cath for urine sample and for retention. Patient finally voided without straight cath. Next shift updated. Continue to monitor.  .

## 2022-04-09 NOTE — PROVIDER NOTIFICATION
Provider Notified : Patient has not voided since the shift. bladder scanned for 409. Need an order for UA. can i get order for straight cath?    Addendum:   Md put an order for straight cath, writer could not straight cath because patient voided, had 400 output.

## 2022-04-10 ENCOUNTER — APPOINTMENT (OUTPATIENT)
Dept: OCCUPATIONAL THERAPY | Facility: CLINIC | Age: 77
DRG: 871 | End: 2022-04-10
Payer: MEDICARE

## 2022-04-10 LAB
ANION GAP SERPL CALCULATED.3IONS-SCNC: 3 MMOL/L (ref 3–14)
BACTERIA BLD CULT: NO GROWTH
BACTERIA BLD CULT: NO GROWTH
BUN SERPL-MCNC: 17 MG/DL (ref 7–30)
CALCIUM SERPL-MCNC: 8.8 MG/DL (ref 8.5–10.1)
CHLORIDE BLD-SCNC: 110 MMOL/L (ref 94–109)
CO2 SERPL-SCNC: 28 MMOL/L (ref 20–32)
CREAT SERPL-MCNC: 0.95 MG/DL (ref 0.52–1.04)
ERYTHROCYTE [DISTWIDTH] IN BLOOD BY AUTOMATED COUNT: 12.1 % (ref 10–15)
GFR SERPL CREATININE-BSD FRML MDRD: 62 ML/MIN/1.73M2
GLUCOSE BLD-MCNC: 103 MG/DL (ref 70–99)
HCT VFR BLD AUTO: 25.8 % (ref 35–47)
HGB BLD-MCNC: 8 G/DL (ref 11.7–15.7)
MCH RBC QN AUTO: 30.5 PG (ref 26.5–33)
MCHC RBC AUTO-ENTMCNC: 31 G/DL (ref 31.5–36.5)
MCV RBC AUTO: 99 FL (ref 78–100)
PLATELET # BLD AUTO: 154 10E3/UL (ref 150–450)
POTASSIUM BLD-SCNC: 3.4 MMOL/L (ref 3.4–5.3)
RBC # BLD AUTO: 2.62 10E6/UL (ref 3.8–5.2)
SODIUM SERPL-SCNC: 141 MMOL/L (ref 133–144)
WBC # BLD AUTO: 5.1 10E3/UL (ref 4–11)

## 2022-04-10 PROCEDURE — 250N000013 HC RX MED GY IP 250 OP 250 PS 637: Performed by: INTERNAL MEDICINE

## 2022-04-10 PROCEDURE — 82310 ASSAY OF CALCIUM: CPT | Performed by: INTERNAL MEDICINE

## 2022-04-10 PROCEDURE — 97530 THERAPEUTIC ACTIVITIES: CPT | Mod: GO

## 2022-04-10 PROCEDURE — 258N000003 HC RX IP 258 OP 636: Performed by: INTERNAL MEDICINE

## 2022-04-10 PROCEDURE — 120N000001 HC R&B MED SURG/OB

## 2022-04-10 PROCEDURE — 250N000013 HC RX MED GY IP 250 OP 250 PS 637: Performed by: STUDENT IN AN ORGANIZED HEALTH CARE EDUCATION/TRAINING PROGRAM

## 2022-04-10 PROCEDURE — 36415 COLL VENOUS BLD VENIPUNCTURE: CPT | Performed by: INTERNAL MEDICINE

## 2022-04-10 PROCEDURE — 250N000013 HC RX MED GY IP 250 OP 250 PS 637: Performed by: HOSPITALIST

## 2022-04-10 PROCEDURE — 250N000011 HC RX IP 250 OP 636: Performed by: INTERNAL MEDICINE

## 2022-04-10 PROCEDURE — 99233 SBSQ HOSP IP/OBS HIGH 50: CPT | Performed by: STUDENT IN AN ORGANIZED HEALTH CARE EDUCATION/TRAINING PROGRAM

## 2022-04-10 PROCEDURE — 85027 COMPLETE CBC AUTOMATED: CPT | Performed by: INTERNAL MEDICINE

## 2022-04-10 PROCEDURE — 97535 SELF CARE MNGMENT TRAINING: CPT | Mod: GO

## 2022-04-10 RX ORDER — POTASSIUM CHLORIDE 1.5 G/1.58G
40 POWDER, FOR SOLUTION ORAL ONCE
Status: COMPLETED | OUTPATIENT
Start: 2022-04-10 | End: 2022-04-10

## 2022-04-10 RX ADMIN — RIVAROXABAN 15 MG: 15 TABLET, FILM COATED ORAL at 17:45

## 2022-04-10 RX ADMIN — METHYLPHENIDATE HYDROCHLORIDE 2.5 MG: 5 TABLET ORAL at 06:54

## 2022-04-10 RX ADMIN — MIRABEGRON 25 MG: 25 TABLET, FILM COATED, EXTENDED RELEASE ORAL at 09:50

## 2022-04-10 RX ADMIN — PIPERACILLIN AND TAZOBACTAM 3.38 G: 3; .375 INJECTION, POWDER, LYOPHILIZED, FOR SOLUTION INTRAVENOUS at 00:58

## 2022-04-10 RX ADMIN — LEVETIRACETAM 1000 MG: 500 TABLET, FILM COATED ORAL at 09:51

## 2022-04-10 RX ADMIN — PIPERACILLIN AND TAZOBACTAM 3.38 G: 3; .375 INJECTION, POWDER, LYOPHILIZED, FOR SOLUTION INTRAVENOUS at 21:47

## 2022-04-10 RX ADMIN — BUSPIRONE HYDROCHLORIDE 30 MG: 15 TABLET ORAL at 21:56

## 2022-04-10 RX ADMIN — LEVETIRACETAM 1000 MG: 500 TABLET, FILM COATED ORAL at 21:56

## 2022-04-10 RX ADMIN — PIPERACILLIN AND TAZOBACTAM 3.38 G: 3; .375 INJECTION, POWDER, LYOPHILIZED, FOR SOLUTION INTRAVENOUS at 14:57

## 2022-04-10 RX ADMIN — POTASSIUM CHLORIDE, DEXTROSE MONOHYDRATE AND SODIUM CHLORIDE: 150; 5; 450 INJECTION, SOLUTION INTRAVENOUS at 17:01

## 2022-04-10 RX ADMIN — QUETIAPINE FUMARATE 25 MG: 25 TABLET ORAL at 21:56

## 2022-04-10 RX ADMIN — LEVETIRACETAM 250 MG: 250 TABLET, FILM COATED ORAL at 09:50

## 2022-04-10 RX ADMIN — PIPERACILLIN AND TAZOBACTAM 3.38 G: 3; .375 INJECTION, POWDER, LYOPHILIZED, FOR SOLUTION INTRAVENOUS at 05:52

## 2022-04-10 RX ADMIN — MIRTAZAPINE 15 MG: 15 TABLET, FILM COATED ORAL at 21:56

## 2022-04-10 RX ADMIN — PANTOPRAZOLE SODIUM 40 MG: 40 TABLET, DELAYED RELEASE ORAL at 06:55

## 2022-04-10 RX ADMIN — BUSPIRONE HYDROCHLORIDE 30 MG: 15 TABLET ORAL at 09:52

## 2022-04-10 RX ADMIN — THERA TABS 1 TABLET: TAB at 09:52

## 2022-04-10 RX ADMIN — POTASSIUM CHLORIDE 40 MEQ: 1.5 POWDER, FOR SOLUTION ORAL at 17:02

## 2022-04-10 RX ADMIN — AMLODIPINE BESYLATE 5 MG: 5 TABLET ORAL at 09:52

## 2022-04-10 RX ADMIN — LEVETIRACETAM 250 MG: 250 TABLET, FILM COATED ORAL at 21:56

## 2022-04-10 RX ADMIN — PANTOPRAZOLE SODIUM 40 MG: 40 TABLET, DELAYED RELEASE ORAL at 16:57

## 2022-04-10 RX ADMIN — FLUOXETINE 60 MG: 20 CAPSULE ORAL at 09:52

## 2022-04-10 ASSESSMENT — ACTIVITIES OF DAILY LIVING (ADL)
ADLS_ACUITY_SCORE: 37
ADLS_ACUITY_SCORE: 37
ADLS_ACUITY_SCORE: 35
ADLS_ACUITY_SCORE: 33
ADLS_ACUITY_SCORE: 37
ADLS_ACUITY_SCORE: 33
ADLS_ACUITY_SCORE: 37
ADLS_ACUITY_SCORE: 35
ADLS_ACUITY_SCORE: 35
ADLS_ACUITY_SCORE: 37
ADLS_ACUITY_SCORE: 35
ADLS_ACUITY_SCORE: 37
ADLS_ACUITY_SCORE: 35
ADLS_ACUITY_SCORE: 37

## 2022-04-10 NOTE — PLAN OF CARE
Pt somnolent but arousable to voice and stimuli. VSS on 1L O2 per NC. Denied pain. No reported CP. PIV to left intact, infusing w/ D5 0.45% NS + K @ 50 ml/hr. Incontinent of bowel and bladder (wet brief, bladder scanned for 210 ml), had one large BM this shift. Purewick in place.Turn q2h. Mepi to coccyx CDI. Maintained sz precautions. Will continue POC.     Goal Outcome Evaluation:    Plan of Care Reviewed With: patient     Overall Patient Progress: no change

## 2022-04-10 NOTE — PLAN OF CARE
Patient up to chair with PT by sera steady, back to bed with left when patient ready. Patient does not care for the minced and moist diet. Roll fairly well in bed with assist of 2. Appetite poor.

## 2022-04-10 NOTE — PROGRESS NOTES
Disoriented to time, place and situation. Ls diminished with slight fine crackles. Lethargic, but alert and responds to questions. VSS  : Temp 99.4, /68, oxygen 95% on 1L NC. Denied pain. Up to the recliner with assist of 2 on mechanical lift. Minced moist diet with mildly thick liquids. Poor appetite with sight improvement. Large incontinent  BM X1 during this shift. Re-started continuous IVF per Md order due to poor PO. Updated daughter on patient's progress.  Continue to monitor.

## 2022-04-10 NOTE — PROGRESS NOTES
[facility]    Medicine Progress Note - Hospitalist Service    Date of Admission:  4/4/2022    Assessment & Plan     Olga Bailey is a 76 year old female with history of glioblastoma multiforme of the left parietal region who underwent craniotomy and complete resection in March 2021 as well as radiation and chemotherapy.  Since then she had an episode of PJP pneumonia and retroperitoneal hematoma requiring blood transfusion complicated by TRALI.  She also has history of seizure disorder on Keppra, depressive disorder, and uncomplicated asthma. She is currently residing in a nursing home and was brought to the [facility] emergency department on 4/4/22 for evaluation for slowly progressive generalized weakness.       Patient stated that she had generalized weakness which had gradually gotten worse.  She had some right-sided weakness.  She also states that her weakness also shifted to the left side.  She had no cough or shortness of breath.  She denied fever, vomiting, diarrhea, and urinary symptoms.  She had significantly decreased oral intake.      EMS was called and she was noted to be hypoxic with oxygen saturation 88%.  She was given neb treatment.  She was brought to emergency room for evaluation.  On arrival to emergency room, her vital signs showed  temperature 99.9, pulse 94, blood pressure 113/74, oxygen saturation 97% on 3 L.  Laboratory work-up showed sodium 139, potassium 3.2, creatinine 1.42, , lactic acid 0.9, WBC 5.8, hemoglobin 9.7.  Procalcitonin 0.26.  Chest x-ray showed mild alveolar infiltrate in the left midlung.  MRI of the brain showed no evidence of acute ischemia or hemorrhage.  Stable postoperative change in the left side craniotomy and tumor resection.  MRA of the brain/neck negative for high-grade stenosis.     She was admitted to the hospital for further management including IV fluids and IV antibiotics for pneumonia.  Neurology was  contacted by phone  given unremarkable MRI/MRA was not thought to have had a stroke.  Ceftriaxone and doxycycline were started.  IV fluids were started. Olga was admitted to the hospital for further cares.  After admission she developed high fevers.  She remained lethargic and developed worsening confusion.  Some concern about aspiration developed so she was seen by speech therapy.  Modified diet was ordered.  Ceftriaxone was changed to Zosyn to cover aspiration.  Video swallow study was performed and modified diet was continued.      Problem list:     1. Sepsis and acute hypoxic respiratory failure, secondary to community acquired pneumonia versus aspiration pneumonia on top of pre-existing lung fibrosis.    High resolution chest CT scan (4/8) shows interstitial pneumonia on top of pre-existing interstitial fibrosis.    There was concern for aspiration, antibiotic changed from ceftriaxone to Zosyn on 4/6.  Finished 5 days of doxycycline on 4/9.    Pulmonary consult appreciated.  Sputum culture and respiratory viral panel negative till now.  Fungitell pending.  2. Metabolic encephalopathy.      Secondary to pneumonia/respiratory failure.    Polypharmacy also likely contributing, the dose of Seroquel decreased to 25 mg.  3. Dysphagia:     Speech therapy consult appreciated, Video swallow study done and showed penetration but no aspiration.    Minced and moist diet with mildly thick liquids ordered.  4. Hypokalemia: Replace per protocol.  5. History of glioblastoma multiforme in the left parietal region status post complete resection in March 2021: Follows with [facility].  Is on radiation 5/2021.  Started adjuvant therapy with temozolomide which was complicated by hematologic toxicity so changed to metronomic/daily dosing and 7/2021 which was tolerated and showed positive treatment effects.  She has completed 6 months of adjuvant temozolomide as of 12/2021.  Follow-up imaging 2/2022 without concern  and plan to remain off treatment and on imaging surveillance moving forward.  6. History of seizure disorder: On Keppra, resumed.  7. Chronic kidney disease:     Baseline creatinine around 1.3-1.45, currently less than 1.     IV fluids 50 mL/h and stop once eating and drinking well.  As per RN she is still not eating or drinking well.  8. Depression, Deconditioning and Poor functional status: Continue prior to admission BuSpar, fluoxetine, Ritalin, olanzapine, and Seroquel.  Seroquel decreased from 50 mg to 25 mg.  She is followed by PMR and palliative care as an outpatient.  She has been noted to have declined in her functional status due to poorly controlled depression and lack of inspiration/motivation with chronic fatigue and neurological deficits.     PT and OT consulted.  PT deferring care given current status.    Palliative consult appreciated.  Patient was changed to DNR/DNI and wishes to enroll in hospice at time of discharge to avoid future hospitalizations.  But later the plan was changed to continue current treatment and code was changed back to full code as daughter did not feel that making DNR/DNI and hospice were ethical considering patient's altered mental status.  9. History of bilateral DVT and retroperitoneal hemorrhage while on anticoagulation now with IVC filter: Found to have bilateral DVT during hospitalization last year and started on enoxaparin which was complicated by right-sided retroperitoneal hemorrhage requiring transfusion support.  She is now on anticoagulation with Xarelto.  10. Acute on chronic anemia and Pancytopenia:    Hemoglobin  remained stable at around 8 and no active bleeding.    CT on 4/8 did not show retroperitoneal hematoma.          Diet: Snacks/Supplements Adult: Ensure Enlive; With Meals  Minced & Moist Diet (level 5) Mildly Thick (level 2)    DVT Prophylaxis: DOAC  Martino Catheter: Not present  Central Lines: None  Cardiac Monitoring: None  Code Status: Full Code       Disposition Plan   Expected Discharge: 3 to 5 days  Anticipated discharge location: assisted living  with home care versus long-term care         The patient's care was discussed with the Bedside Nurse, Patient and Patient's Family.    Buddy Ledbetter MD  Hospitalist Service  [facility]  Securely message with the Vocera Web Console (learn more here)  Text page via [employer] Paging/Directory         Clinically Significant Risk Factors Present on Admission                # Severe Malnutrition: based on nutrition assessment     ______________________________________________________________________    Interval History   Patient had a choking episode yesterday.  Was seen by speech therapy again today and continued on soft and by his side solids with mildly thick liquids.    Data reviewed today: I reviewed all medications, new labs and imaging results over the last 24 hours.    Physical Exam   Vital Signs: Temp: 97.6  F (36.4  C) Temp src: Oral BP: 124/66 Pulse: 81   Resp: 18 SpO2: 96 % O2 Device: Nasal cannula Oxygen Delivery: 1 LPM  Weight: 147 lbs 1.6 oz  GENERAL: Confused.  Oriented to self and others.  She was that she was in the hospital but could not tell me the year..  EYES: PERRLA, Normal conjunctiva.  HEART:  Regular rate and rhythm. No JVD. Pulses normal. No edema.  LUNGS: Bilateral crackles, left more than right.    ABDOMEN:  Soft, no hepatosplenomegaly, normal bowel sounds.  EXTREMETIES: No clubbing, cyanosis or ischemia  SKIN:  Dry to touch, No rash.  Neurological examination nonfocal.      Data   Recent Labs   Lab 04/10/22  0639 04/09/22  1546 04/09/22  0718 04/08/22  1618 04/08/22  0657 04/07/22  1505 04/07/22  0641 04/06/22  0643   WBC 5.1  --   --   --  4.0  --   --  4.3   HGB 8.0*  --   --   --  8.1*  --   --  8.5*   MCV 99  --   --   --  100  --   --  103*     --   --   --  152  --   --  150     --   --   --  141  --  142 143   POTASSIUM 3.4 3.5 3.4   < > 3.3*   < >  3.3*  3.2* 3.5   CHLORIDE 110*  --   --   --  108  --  110* 111*   CO2 28  --   --   --  27  --  26 28   BUN 17  --   --   --  15  --  14 21   CR 0.95  --  1.00  --  1.13*  --  1.00 1.11*   ANIONGAP 3  --   --   --  6  --  6 4   ESTIVEN 8.8  --   --   --  8.6  --  8.8 8.8   *  --   --   --  114*  --  120* 101*    < > = values in this interval not displayed.

## 2022-04-11 ENCOUNTER — APPOINTMENT (OUTPATIENT)
Dept: SPEECH THERAPY | Facility: CLINIC | Age: 77
DRG: 871 | End: 2022-04-11
Payer: MEDICARE

## 2022-04-11 ENCOUNTER — APPOINTMENT (OUTPATIENT)
Dept: PHYSICAL THERAPY | Facility: CLINIC | Age: 77
DRG: 871 | End: 2022-04-11
Attending: INTERNAL MEDICINE
Payer: MEDICARE

## 2022-04-11 LAB
1,3 BETA GLUCAN SER-MCNC: 34 PG/ML
BACTERIA BLD CULT: NO GROWTH
BACTERIA BLD CULT: NO GROWTH
OBSERVATION IMP: NEGATIVE

## 2022-04-11 PROCEDURE — 250N000013 HC RX MED GY IP 250 OP 250 PS 637: Performed by: INTERNAL MEDICINE

## 2022-04-11 PROCEDURE — 99233 SBSQ HOSP IP/OBS HIGH 50: CPT | Performed by: STUDENT IN AN ORGANIZED HEALTH CARE EDUCATION/TRAINING PROGRAM

## 2022-04-11 PROCEDURE — 120N000001 HC R&B MED SURG/OB

## 2022-04-11 PROCEDURE — 97162 PT EVAL MOD COMPLEX 30 MIN: CPT | Mod: GP | Performed by: PHYSICAL THERAPIST

## 2022-04-11 PROCEDURE — 99232 SBSQ HOSP IP/OBS MODERATE 35: CPT | Performed by: INTERNAL MEDICINE

## 2022-04-11 PROCEDURE — 258N000003 HC RX IP 258 OP 636: Performed by: INTERNAL MEDICINE

## 2022-04-11 PROCEDURE — 97530 THERAPEUTIC ACTIVITIES: CPT | Mod: GP | Performed by: PHYSICAL THERAPIST

## 2022-04-11 PROCEDURE — 99233 SBSQ HOSP IP/OBS HIGH 50: CPT | Performed by: NURSE PRACTITIONER

## 2022-04-11 PROCEDURE — 92526 ORAL FUNCTION THERAPY: CPT | Mod: GN | Performed by: SPEECH-LANGUAGE PATHOLOGIST

## 2022-04-11 PROCEDURE — 250N000011 HC RX IP 250 OP 636: Performed by: INTERNAL MEDICINE

## 2022-04-11 PROCEDURE — 250N000013 HC RX MED GY IP 250 OP 250 PS 637: Performed by: HOSPITALIST

## 2022-04-11 RX ADMIN — RIVAROXABAN 15 MG: 15 TABLET, FILM COATED ORAL at 16:15

## 2022-04-11 RX ADMIN — LEVETIRACETAM 250 MG: 250 TABLET, FILM COATED ORAL at 21:01

## 2022-04-11 RX ADMIN — PIPERACILLIN AND TAZOBACTAM 3.38 G: 3; .375 INJECTION, POWDER, LYOPHILIZED, FOR SOLUTION INTRAVENOUS at 21:00

## 2022-04-11 RX ADMIN — PIPERACILLIN AND TAZOBACTAM 3.38 G: 3; .375 INJECTION, POWDER, LYOPHILIZED, FOR SOLUTION INTRAVENOUS at 08:28

## 2022-04-11 RX ADMIN — MIRTAZAPINE 15 MG: 15 TABLET, FILM COATED ORAL at 21:02

## 2022-04-11 RX ADMIN — BUSPIRONE HYDROCHLORIDE 30 MG: 15 TABLET ORAL at 08:22

## 2022-04-11 RX ADMIN — PANTOPRAZOLE SODIUM 40 MG: 40 TABLET, DELAYED RELEASE ORAL at 08:22

## 2022-04-11 RX ADMIN — LEVETIRACETAM 250 MG: 250 TABLET, FILM COATED ORAL at 08:21

## 2022-04-11 RX ADMIN — THERA TABS 1 TABLET: TAB at 08:22

## 2022-04-11 RX ADMIN — LEVETIRACETAM 1000 MG: 500 TABLET, FILM COATED ORAL at 08:22

## 2022-04-11 RX ADMIN — METHYLPHENIDATE HYDROCHLORIDE 2.5 MG: 5 TABLET ORAL at 08:22

## 2022-04-11 RX ADMIN — MIRABEGRON 25 MG: 25 TABLET, FILM COATED, EXTENDED RELEASE ORAL at 08:22

## 2022-04-11 RX ADMIN — POTASSIUM CHLORIDE, DEXTROSE MONOHYDRATE AND SODIUM CHLORIDE: 150; 5; 450 INJECTION, SOLUTION INTRAVENOUS at 15:45

## 2022-04-11 RX ADMIN — PANTOPRAZOLE SODIUM 40 MG: 40 TABLET, DELAYED RELEASE ORAL at 16:15

## 2022-04-11 RX ADMIN — AMLODIPINE BESYLATE 5 MG: 5 TABLET ORAL at 08:22

## 2022-04-11 RX ADMIN — BUSPIRONE HYDROCHLORIDE 30 MG: 15 TABLET ORAL at 21:02

## 2022-04-11 RX ADMIN — LEVETIRACETAM 1000 MG: 500 TABLET, FILM COATED ORAL at 21:01

## 2022-04-11 RX ADMIN — PIPERACILLIN AND TAZOBACTAM 3.38 G: 3; .375 INJECTION, POWDER, LYOPHILIZED, FOR SOLUTION INTRAVENOUS at 03:30

## 2022-04-11 RX ADMIN — PIPERACILLIN AND TAZOBACTAM 3.38 G: 3; .375 INJECTION, POWDER, LYOPHILIZED, FOR SOLUTION INTRAVENOUS at 14:28

## 2022-04-11 RX ADMIN — FLUOXETINE 60 MG: 20 CAPSULE ORAL at 08:20

## 2022-04-11 ASSESSMENT — ACTIVITIES OF DAILY LIVING (ADL)
ADLS_ACUITY_SCORE: 25
ADLS_ACUITY_SCORE: 27
ADLS_ACUITY_SCORE: 31
ADLS_ACUITY_SCORE: 27
ADLS_ACUITY_SCORE: 31
ADLS_ACUITY_SCORE: 25
ADLS_ACUITY_SCORE: 27
ADLS_ACUITY_SCORE: 31
ADLS_ACUITY_SCORE: 31
ADLS_ACUITY_SCORE: 25
ADLS_ACUITY_SCORE: 29
ADLS_ACUITY_SCORE: 31
ADLS_ACUITY_SCORE: 25
ADLS_ACUITY_SCORE: 25
ADLS_ACUITY_SCORE: 31
ADLS_ACUITY_SCORE: 25
ADLS_ACUITY_SCORE: 25
ADLS_ACUITY_SCORE: 27
ADLS_ACUITY_SCORE: 29
ADLS_ACUITY_SCORE: 27
ADLS_ACUITY_SCORE: 27
ADLS_ACUITY_SCORE: 25
ADLS_ACUITY_SCORE: 31
ADLS_ACUITY_SCORE: 31

## 2022-04-11 NOTE — PROGRESS NOTES
04/11/22 0800   Quick Adds   Type of Visit Initial PT Evaluation   Living Environment   People in Home alone   Current Living Arrangements assisted living   Home Accessibility no concerns   Living Environment Comments Per chart review, pt lives at [facility]   Self-Care   Usual Activity Tolerance moderate   Current Activity Tolerance fair   Equipment Currently Used at Home walker, standard;wheelchair, manual   Activity/Exercise/Self-Care Comment Per chart review pt had A with all mobility.   General Information   Onset of Illness/Injury or Date of Surgery 04/04/22   Referring Physician Dr. Lg Vo   Pertinent History of Current Problem (include personal factors and/or comorbidities that impact the POC) Olga Bailey is a 76 year old female with history of glioblastoma multiforme of the left parietal region who underwent craniotomy and complete resection in March 2021 as well as radiation and chemotherapy.  Since then she had an episode of PJP pneumonia and retroperitoneal hematoma requiring blood transfusion complicated by TRALI.  She also has history of seizure disorder on Keppra, depressive disorder, and uncomplicated asthma. She is currently residing in a nursing home and was brought to the [facility] emergency department on 4/4/22 for evaluation for slowly progressive generalized weakness.  Pt dx with Sepsis and acute hypoxic respiratory failure, secondary to community acquired pneumonia versus aspiration pneumonia on top of pre-existing lung fibrosis.Pt also with metabolic encephalopathy.   Existing Precautions/Restrictions fall   General Observations Pt lying in bed.  Vitals taken in sitting: /75, HR 99, SpO2 93% on 1L   Cognition   Affect/Mental Status (Cognition) flat/blunted affect   Orientation Status (Cognition) oriented to;person   Cognitive Status Comments Pt aware she is in the hospital.  Oriented to year but not month or day.   Pain Assessment   Patient  Currently in Pain No   Integumentary/Edema   Integumentary/Edema Comments swelling noted in R hand and UE   Posture    Posture Forward head position;Protracted shoulders   Range of Motion (ROM)   ROM Comment Tight hamstrings and gastrocs with SLR to ~ 45 deg and B ankle DF to ~-5-10 deg.  Limited B end range shld flex   Strength (Manual Muscle Testing)   Strength Comments Generalized weakness with R UE and LE weaker than L.  Grossly 1+/5 R UE strength, 2-/5 B LE strength, ~3/5 L UE strength.   Bed Mobility   Comment, (Bed Mobility) Sup > sit via L sidely with Max A at trunk and Mod A at LEs.   Transfers   Transfers sit-stand transfer;bed-chair transfer   Comment, (Transfers) Sit <> stand and SPT bed > chair with Max A x 2   Bed-Chair Transfer   Bed-Chair [place] (Transfers) moderate assist (50% patient effort);2 person assist   Sit-Stand Transfer   Sit-Stand [place] (Transfers) verbal cues;maximum assist (25% patient effort);2 person assist   Assistive Device (Sit-Stand Transfers) walker, front-wheeled   Comment, (Sit-Stand Transfer) stand pivot transfer bed > chair to the L with Mod A x 2 with L hand on FWW and R arm hanging limply at side.   Gait/Stairs (Locomotion)   Comment, (Gait/Stairs) unable   Balance   Balance Comments Fair sitting balance.  Pt with retro lean as she fatigues requiring Min A at trunk to maintain midline.  Poor standing balance requiring L UE support and Mod A x 2 at trunk for stability   Sensory Examination   Sensory Perception Comments NT   Coordination   Coordination Comments pt slow to initiate all movement   Muscle Tone   Muscle Tone Comments Increased extensor tone noted in R LE   Clinical Impression   Criteria for Skilled Therapeutic Intervention Yes, treatment indicated   PT Diagnosis (PT) Decreased functional mobility   Influenced by the following impairments Diffuse mms weakness (R side > L), impaired balance, decreased activity tolerance, impaired cognition, increased LE  tone   Functional limitations due to impairments assisted mobiltiy and ADLs, unable to care for herself, increased falls risk, unable to amb household distances   Clinical Presentation (PT Evaluation Complexity) Evolving/Changing   Clinical Presentation Rationale Pt with multiple comorbidities and progressive weakness   Clinical Decision Making (Complexity) moderate complexity   Planned Therapy Interventions (PT) balance training;bed mobility training;gait training;motor coordination training;neuromuscular re-education;patient/family education;ROM (range of motion);strengthening;stretching;transfer training   Risk & Benefits of therapy have been explained evaluation/treatment results reviewed;care plan/treatment goals reviewed;risks/benefits reviewed;current/potential barriers reviewed;participants voiced agreement with care plan;participants included;patient   PT Discharge Planning   PT Discharge Recommendation (DC Rec) Transitional Care Facility   PT Rationale for DC Rec Pt below baseline with all mobility, currently requiring Mod/Max A x 2 to perform a stand pivot transfer bed > chair.  Continued skilled PT in the acute and TCU setting to increase strength and progress independence with all mobility and determine the best long term DC recommendation   PT Brief overview of current status Trial logan steady (needs A to place R hand on horizontal bar and to maintain )   Total Evaluation Time   Total Evaluation Time (Minutes) 15   Physical Therapy Goals   PT Frequency 5x/week   PT Predicated Duration/Target Date for Goal Attainment 04/18/22   PT Goals Bed Mobility;Transfers;Gait;PT Goal 1   PT: Bed Mobility Minimal assist;Rolling;Supine to/from sit   PT: Transfers Minimal assist;Sit to/from stand;Assistive device   PT: Gait Minimal assist;Assistive device;10 feet   PT: Goal 1 Pt will sit on EOB x 10 min while performing reaching task with L UE to complete ADLs while maintaining good sitting balance with SBA

## 2022-04-11 NOTE — PROGRESS NOTES
Pulmonary f/u    Patient states her breathing is improved over the weekend, but remains on 1 LPM of O2.  Not coughing and no chest pain.  Sitting in chair as she's non-ambulatory.      Day 5 of Zosyn.      Now afebrile for 2 days.      Awake and alert but slow response time and speech speed.    Lungs rare crackles bilateral.   H- RR   Abdomen non tender,  Feet and hands are warm without edema    Fungitell is 23 (was  > 500 last year when probably had PCP)   Respiratory viral panel negative      A: Mild hypoxemia and fever in hospital c/w pneumonia although procalcitonin minimally elevated. Fever improved with antibiotics.     Clearly has some chronic ILD changes that is making resolution of the hypoxemia more difficult.  She  May need to be discharged on home O2.  Overall etiology of her underlying ILD not clear but the medicine she got for the brain tumor (temozolamide) has occasional acute pneumonitis effects.  But she's been off that medication for several months now.  No steroids recommended at this point.      Rec: Continue O2  Complete 7 d of zosyn

## 2022-04-11 NOTE — PROGRESS NOTES
[facility]  Palliative Care Progress Note  Text Page     Assessment & Plan   Recommendations:  1. Goals of Care- Full Code  Hospitalization goals discussed with patient and daughter La Nena.  Reviewed goal for restorative cares and FULL code.      Decisional Capacity- Intact. Patient has an advance directive dated 01/21/2016.  If patient becomes unable to demonstrate decisional capacity, her three children, Dario, Sudheer and La Nena are joint health care agents.     - continue with goals of care pending course of hospitalization.     2. Fatigue/weakness  Progressive fatigue and weakness which is one cause for current admission.  Difficulty with mobility and max assist.  Slow improvement during hospital stay.  - ongoing physical and occupational therapies to determine disposition.  - continued work with mobility needed for optimization of function and quality of life prior to discharge.     3. Dyspnea  Pneumonia with sepsis found on work up, history of asthma.  Pulmonary consult to evaluate for ILD in the setting of previous PCP.   - recommend continued work up and conservative interventions as proposed by Pulmonology and Hospitalist teams.   - If significant interventions indicated per work up, patient would require careful conversation regarding goals for care prior to proceeding.     4. Dysphagia  Recent history of dysphagia with choking episodes during hospitalization.  Modified diet at baseline.  - SLP consult, appreciate recommendations.     5. Aphasia  Baseline findings on exam, secondary to previous GBM, surgical resection and Oncologic treatments.  Requires time during assessments to express known needs and wishes.  Patient is oriented and decisional with plan of care.     6. Spiritual Care  Oriented to Spiritual Health as part of Palliative Care team. Consultation placed for  to follow.  Spiritual Background: not specified.     7. Care Planning  Appreciate Care of Nicole  "Uyen.  Medications for discharge - pending at this time.       Medical Decision Making and Goals of Care:  Discussed on April 11, 2022 with Lisha DEVRIES CNP:   Met with Olga at the bedside. She is alert and oriented to hospitalization and precipitating factors for admission.  Reviewed the events of the weekend and ongoing conversation regarding goals of care.  She explained that she was \"feeling more up\" and in the place mentally of wanting to do medical interventions.  Reviewed her wish for FULL code and she confirmed that she would want aggressive interventions.  She stated that she would not want to be completely dependent or on machines long term, but would like to try short term aggressive cares.  She denied symptoms and understood plan of care without further questions or concerns.    Called and spoke to La Nena briefly to review the plan of care.  She inquired about the utility of medication de-escalation to reduce some of the pill burden that her mother had.  Offered review of medications and potential for reduction of some medication burden at this time.  Deferred to Medical team for further discussion regarding safe de-prescribing.  She denied further questions or concerns.        Lisha DEVRIES CNP  Pain Management and Palliative Care  [facility]  Pgr: [phone]      Time Spent on this Encounter   Total unit/floor time 35 minutes, time consisted of the following, examination of the patient, reviewing the record and completing documentation. >50% of time spent in counseling and coordination of care, Bedside Nurse Hannah and Hospitalist Dr. Ledbetter.  Time spend counseling with patient and family consisted of the following topics, goals of care, care planning for discharge and symptom management.    Review of Systems    CONSTITUTIONAL: NEGATIVE for fever, chills, change in weight  ENT/MOUTH: NEGATIVE for ear, mouth and throat problems  RESP: NEGATIVE for " significant cough or SOB  CV: NEGATIVE for chest pain, palpitations or peripheral edema    Physical Exam   Temp:  [97.1  F (36.2  C)-98.8  F (37.1  C)] 98.8  F (37.1  C)  Pulse:  [] 92  Resp:  [18-22] 22  BP: (126-150)/(70-82) 132/72  SpO2:  [95 %-96 %] 96 %  156 lbs 0 oz  Exam:  GEN:  Alert, oriented x 3, sitting in bed, appears comfortable.  HEENT:  Normocephalic/atraumatic, no scleral icterus, no nasal discharge, mouth moist.  CV:  RRR, S1, S2; no murmurs or other irregularities noted.  +3 DP/PT pulses bilatererally; no edema BLE.  RESP:  Symmetric chest rise on inhalation noted.  Normal respiratory effort.  ABD:  Rounded, soft, non-tender/non-distended.  +BS    NEURO: Moves extremities antigravity, right sided weakness.  Word finding with pauses during conversation and staring ahead. Oriented x4.  PAIN BEHAVIOR: Cooperative  Psych:  Normal affect.  Calm, cooperative, conversant appropriately.       Medications     dextrose 5% and 0.45% NaCl + KCl 20 mEq/L 50 mL/hr at 04/10/22 1701     - MEDICATION INSTRUCTIONS -         amLODIPine  5 mg Oral Daily     busPIRone HCl  30 mg Oral BID     FLUoxetine  60 mg Oral Daily     levETIRAcetam  1,000 mg Oral BID     levETIRAcetam  250 mg Oral BID     methylphenidate  2.5 mg Oral QAM AC     mirabegron  25 mg Oral Daily     mirtazapine  15 mg Oral At Bedtime     multivitamin, therapeutic  1 tablet Oral Daily     pantoprazole  40 mg Oral BID AC     piperacillin-tazobactam  3.375 g Intravenous Q6H     QUEtiapine  25 mg Oral At Bedtime     rivaroxaban ANTICOAGULANT  15 mg Oral Daily with supper     sodium chloride (PF)  3 mL Intracatheter Q8H       Data   No results found. However, due to the size of the patient record, not all encounters were searched. Please check Results Review for a complete set of results.

## 2022-04-11 NOTE — PROGRESS NOTES
Care Management Follow Up    Length of Stay (days): 7    Expected Discharge Date: 04/12/2022     Concerns to be Addressed: care coordination/care conferences, discharge planning     Patient plan of care discussed at interdisciplinary rounds: Yes    Anticipated Discharge Disposition: Assisted Living     Anticipated Discharge Services: None  Anticipated Discharge DME: None    Patient/family educated on Medicare website which has current facility and service quality ratings: yes  Education Provided on the Discharge Plan:  yes  Patient/Family in Agreement with the Plan: yes    Referrals Placed by CM/SW: External Care Coordination  Private pay costs discussed: private room/amenity fees and transportation costs    Additional Information:  Carmen was updated that the pt will no longer discharge on hospice, but to TCU.  Carmen spoke with Shazia at [facility], [phone], to update her on the change in discharge plans.    Carmen spoke with the pt's dtr La Nena who said that she has a list of TCUs that she is interested in referrals being sent to, but she will need to call Carmen back with the options.  Carmen provided La Nena with their email address to assist with communication while she is at work.  Carmen will send the TCU referrals as soon as they are updated by La Nena.    Carmen will continue with discharge planning and will be available as needed until discharge.      MACARIO Perez, [facility]  Inpatient Care Coordination  [facility]  [phone]

## 2022-04-11 NOTE — PROGRESS NOTES
[facility]    Medicine Progress Note - Hospitalist Service    Date of Admission:  4/4/2022    Assessment & Plan     Olga Bailey is a 76 year old female with history of glioblastoma multiforme of the left parietal region who underwent craniotomy and complete resection in March 2021 as well as radiation and chemotherapy.  Since then she had an episode of PJP pneumonia and retroperitoneal hematoma requiring blood transfusion complicated by TRALI.  She also has history of seizure disorder on Keppra, depressive disorder, and uncomplicated asthma. She is currently residing in a nursing home and was brought to the [facility] emergency department on 4/4/22 for evaluation for slowly progressive generalized weakness.       Patient stated that she had generalized weakness which had gradually gotten worse.  She had some right-sided weakness.  She also states that her weakness also shifted to the left side.  She had no cough or shortness of breath.  She denied fever, vomiting, diarrhea, and urinary symptoms.  She had significantly decreased oral intake.      EMS was called and she was noted to be hypoxic with oxygen saturation 88%.  She was given neb treatment.  She was brought to emergency room for evaluation.  On arrival to emergency room, her vital signs showed  temperature 99.9, pulse 94, blood pressure 113/74, oxygen saturation 97% on 3 L.  Laboratory work-up showed sodium 139, potassium 3.2, creatinine 1.42, , lactic acid 0.9, WBC 5.8, hemoglobin 9.7.  Procalcitonin 0.26.  Chest x-ray showed mild alveolar infiltrate in the left midlung.  MRI of the brain showed no evidence of acute ischemia or hemorrhage.  Stable postoperative change in the left side craniotomy and tumor resection.  MRA of the brain/neck negative for high-grade stenosis.     She was admitted to the hospital for further management including IV fluids and IV antibiotics for pneumonia.  Neurology was  contacted by phone  given unremarkable MRI/MRA was not thought to have had a stroke.  Ceftriaxone and doxycycline were started.  IV fluids were started. Olga was admitted to the hospital for further cares.  After admission she developed high fevers.  She remained lethargic and developed worsening confusion.  Some concern about aspiration developed so she was seen by speech therapy.  Modified diet was ordered.  Ceftriaxone was changed to Zosyn to cover aspiration.  Video swallow study was performed and modified diet was continued.      Problem list:     1. Sepsis and acute hypoxic respiratory failure, secondary to community acquired pneumonia versus aspiration pneumonia on top of pre-existing lung fibrosis.    High resolution chest CT scan (4/8) shows interstitial pneumonia on top of pre-existing interstitial fibrosis.    There was concern for aspiration, antibiotic changed from ceftriaxone to Zosyn on 4/6.  Finished 5 days of doxycycline on 4/9.    Pulmonary consult appreciated.  Sputum culture, Fungitell and respiratory viral panel negative till now.    2. Metabolic encephalopathy.      Secondary to pneumonia/respiratory failure.    Polypharmacy also likely contributing, the dose of Seroquel decreased to 25 mg and will stop today.  Mental status is better today.  We will continue to decrease Prozac/Wellbutrin.  3. Dysphagia:     Speech therapy consult appreciated, Video swallow study done and showed penetration but no aspiration.    Minced and moist diet with mildly thick liquids ordered.  4. Hypokalemia: Replace per protocol.  5. History of glioblastoma multiforme in the left parietal region status post complete resection in March 2021: Follows with [facility].  Is on radiation 5/2021.  Started adjuvant therapy with temozolomide which was complicated by hematologic toxicity so changed to metronomic/daily dosing and 7/2021 which was tolerated and showed positive treatment effects.  She has  completed 6 months of adjuvant temozolomide as of 12/2021.  Follow-up imaging 2/2022 without concern and plan to remain off treatment and on imaging surveillance moving forward.  6. History of seizure disorder: On Keppra, resumed.  7. Chronic kidney disease:     Baseline creatinine around 1.3-1.45, currently less than 1.     IV fluids 50 mL/h and stop once eating and drinking well.  As per RN she is still not eating or drinking well.  8. Depression, Deconditioning and Poor functional status: Continue prior to admission BuSpar, fluoxetine, Ritalin, olanzapine, and Seroquel.  Seroquel decreased from 50 mg to 25 mg.  She is followed by PMR and palliative care as an outpatient.  She has been noted to have declined in her functional status due to poorly controlled depression and lack of inspiration/motivation with chronic fatigue and neurological deficits.     PT and OT consulted.  PT deferring care given current status.    Palliative consult appreciated.  Patient was changed to DNR/DNI and wishes to enroll in hospice at time of discharge to avoid future hospitalizations.  But later the plan was changed to continue current treatment and code was changed back to full code as daughter did not feel that making DNR/DNI and hospice were ethical considering patient's altered mental status.  9. History of bilateral DVT and retroperitoneal hemorrhage while on anticoagulation now with IVC filter: Found to have bilateral DVT during hospitalization last year and started on enoxaparin which was complicated by right-sided retroperitoneal hemorrhage requiring transfusion support.  She is now on anticoagulation with Xarelto.  10. Acute on chronic anemia and Pancytopenia:    Hemoglobin  remained stable at around 8 and no active bleeding.    CT on 4/8 did not show retroperitoneal hematoma.          Diet: Minced & Moist Diet (level 5) Mildly Thick (level 2)    DVT Prophylaxis: DOAC  Martino Catheter: Not present  Central Lines:  None  Cardiac Monitoring: None  Code Status: Full Code      Disposition Plan   Expected Discharge: 3 to 5 days  Anticipated discharge location: assisted living  with home care versus long-term care         The patient's care was discussed with the Bedside Nurse, Patient and Patient's Family.    Buddy Ledbetter MD  Hospitalist Service  [facility]  Securely message with the Vocera Web Console (learn more here)  Text page via [employer] Paging/Directory         Clinically Significant Risk Factors Present on Admission                # Severe Malnutrition: based on nutrition assessment     ______________________________________________________________________    Interval History   Mental status is improved today and ate slightly better.  I updated patient's daughter    Data reviewed today: I reviewed all medications, new labs and imaging results over the last 24 hours.    Physical Exam   Vital Signs: Temp: 98.8  F (37.1  C) Temp src: Oral BP: 132/72 Pulse: 92   Resp: 22 SpO2: 96 % O2 Device: Nasal cannula Oxygen Delivery: 1 LPM  Weight: 156 lbs 0 oz  GENERAL: Mental status is better today.  Was able to tell me the name of the hospital and what year it is...  EYES: PERRLA, Normal conjunctiva.  HEART:  Regular rate and rhythm. No JVD. Pulses normal. No edema.  LUNGS: Bilateral crackles.    ABDOMEN:  Soft, no hepatosplenomegaly, normal bowel sounds.  EXTREMETIES: No clubbing, cyanosis or ischemia  SKIN:  Dry to touch, No rash.  Neurological examination nonfocal.      Data   Recent Labs   Lab 04/10/22  0639 04/09/22  1546 04/09/22  0718 04/08/22  1618 04/08/22  0657 04/07/22  1505 04/07/22  0641 04/06/22  0643   WBC 5.1  --   --   --  4.0  --   --  4.3   HGB 8.0*  --   --   --  8.1*  --   --  8.5*   MCV 99  --   --   --  100  --   --  103*     --   --   --  152  --   --  150     --   --   --  141  --  142 143   POTASSIUM 3.4 3.5 3.4   < > 3.3*   < > 3.3*  3.2* 3.5   CHLORIDE 110*  --   --   --   108  --  110* 111*   CO2 28  --   --   --  27  --  26 28   BUN 17  --   --   --  15  --  14 21   CR 0.95  --  1.00  --  1.13*  --  1.00 1.11*   ANIONGAP 3  --   --   --  6  --  6 4   ESTIVEN 8.8  --   --   --  8.6  --  8.8 8.8   *  --   --   --  114*  --  120* 101*    < > = values in this interval not displayed.

## 2022-04-11 NOTE — PLAN OF CARE
"Goal Outcome Evaluation:    Vitals: /70 (BP Location: Right arm, Patient Position: Supine, Cuff Size: Adult Regular)   Pulse 86   Temp 98  F (36.7  C) (Axillary)   Resp 18   Ht 1.6 m (5' 3\")   Wt 66.7 kg (147 lb 1.6 oz)   SpO2 95%   BMI 26.06 kg/m      Orientation: Self only    Pertinent:  R arm weakness per baseline. HX of seizures, pads on each side of bed. Meds whole in pudding, likes Vanilla.     Pain: Denies    Respiratory:  LS clear but diminished.     Bowel Sounds: +X4Q. ABD soft, non-tender.     GI/: Pure-wick in place. 400 Ml output tonight.  No bm this shift.     Skin: Miplex on sacrum for protection.     LDA: L PIV infusing D5.45 w/ 20 K @ 50/hr.     Protocols: K     Diet: Minced and moist. Mildly thick liquids.     Activity: Ax2    Followed By: Pulm/ soc wkr/ spiritual health    Plan: Continue current POC.                    "

## 2022-04-11 NOTE — PLAN OF CARE
"/72 (BP Location: Right arm)   Pulse 92   Temp 98.8  F (37.1  C) (Oral)   Resp 22   Ht 1.6 m (5' 3\")   Wt 70.8 kg (156 lb)   SpO2 96%   BMI 27.63 kg/m        A&Ox2. A2 with lift/logan steady. PT/OT working with pt. On 1L O2 via NC. Tolerating diet well, no issues today. Purewick in place. Mepilex to sacrum. Getting IV Zosyn for PNA. Palliative following, see note. Daughter updated via phone. Plan is TCU when placement is found. Will continue POC.   "

## 2022-04-12 ENCOUNTER — APPOINTMENT (OUTPATIENT)
Dept: PHYSICAL THERAPY | Facility: CLINIC | Age: 77
DRG: 871 | End: 2022-04-12
Payer: MEDICARE

## 2022-04-12 ENCOUNTER — APPOINTMENT (OUTPATIENT)
Dept: OCCUPATIONAL THERAPY | Facility: CLINIC | Age: 77
DRG: 871 | End: 2022-04-12
Payer: MEDICARE

## 2022-04-12 PROCEDURE — 97530 THERAPEUTIC ACTIVITIES: CPT | Mod: GP | Performed by: PHYSICAL THERAPIST

## 2022-04-12 PROCEDURE — 97535 SELF CARE MNGMENT TRAINING: CPT | Mod: GO

## 2022-04-12 PROCEDURE — 999N000127 HC STATISTIC PERIPHERAL IV START W US GUIDANCE

## 2022-04-12 PROCEDURE — 250N000011 HC RX IP 250 OP 636: Performed by: STUDENT IN AN ORGANIZED HEALTH CARE EDUCATION/TRAINING PROGRAM

## 2022-04-12 PROCEDURE — 250N000013 HC RX MED GY IP 250 OP 250 PS 637: Performed by: INTERNAL MEDICINE

## 2022-04-12 PROCEDURE — 250N000013 HC RX MED GY IP 250 OP 250 PS 637: Performed by: HOSPITALIST

## 2022-04-12 PROCEDURE — 99232 SBSQ HOSP IP/OBS MODERATE 35: CPT | Performed by: STUDENT IN AN ORGANIZED HEALTH CARE EDUCATION/TRAINING PROGRAM

## 2022-04-12 PROCEDURE — 120N000001 HC R&B MED SURG/OB

## 2022-04-12 PROCEDURE — 250N000011 HC RX IP 250 OP 636: Performed by: INTERNAL MEDICINE

## 2022-04-12 RX ORDER — FUROSEMIDE 10 MG/ML
20 INJECTION INTRAMUSCULAR; INTRAVENOUS ONCE
Status: COMPLETED | OUTPATIENT
Start: 2022-04-12 | End: 2022-04-12

## 2022-04-12 RX ADMIN — BUSPIRONE HYDROCHLORIDE 30 MG: 15 TABLET ORAL at 10:37

## 2022-04-12 RX ADMIN — LEVETIRACETAM 1000 MG: 500 TABLET, FILM COATED ORAL at 21:31

## 2022-04-12 RX ADMIN — THERA TABS 1 TABLET: TAB at 10:37

## 2022-04-12 RX ADMIN — RIVAROXABAN 15 MG: 15 TABLET, FILM COATED ORAL at 16:37

## 2022-04-12 RX ADMIN — LEVETIRACETAM 250 MG: 250 TABLET, FILM COATED ORAL at 10:39

## 2022-04-12 RX ADMIN — LEVETIRACETAM 250 MG: 250 TABLET, FILM COATED ORAL at 21:32

## 2022-04-12 RX ADMIN — MIRTAZAPINE 15 MG: 15 TABLET, FILM COATED ORAL at 21:32

## 2022-04-12 RX ADMIN — PANTOPRAZOLE SODIUM 40 MG: 40 TABLET, DELAYED RELEASE ORAL at 16:37

## 2022-04-12 RX ADMIN — PIPERACILLIN AND TAZOBACTAM 3.38 G: 3; .375 INJECTION, POWDER, LYOPHILIZED, FOR SOLUTION INTRAVENOUS at 21:32

## 2022-04-12 RX ADMIN — AMLODIPINE BESYLATE 5 MG: 5 TABLET ORAL at 10:37

## 2022-04-12 RX ADMIN — BUSPIRONE HYDROCHLORIDE 30 MG: 15 TABLET ORAL at 21:32

## 2022-04-12 RX ADMIN — PIPERACILLIN AND TAZOBACTAM 3.38 G: 3; .375 INJECTION, POWDER, LYOPHILIZED, FOR SOLUTION INTRAVENOUS at 10:34

## 2022-04-12 RX ADMIN — PANTOPRAZOLE SODIUM 40 MG: 40 TABLET, DELAYED RELEASE ORAL at 06:43

## 2022-04-12 RX ADMIN — PIPERACILLIN AND TAZOBACTAM 3.38 G: 3; .375 INJECTION, POWDER, LYOPHILIZED, FOR SOLUTION INTRAVENOUS at 03:06

## 2022-04-12 RX ADMIN — FUROSEMIDE 20 MG: 10 INJECTION, SOLUTION INTRAMUSCULAR; INTRAVENOUS at 10:36

## 2022-04-12 RX ADMIN — FLUOXETINE 60 MG: 20 CAPSULE ORAL at 10:35

## 2022-04-12 RX ADMIN — LEVETIRACETAM 1000 MG: 500 TABLET, FILM COATED ORAL at 10:36

## 2022-04-12 RX ADMIN — MIRABEGRON 25 MG: 25 TABLET, FILM COATED, EXTENDED RELEASE ORAL at 10:37

## 2022-04-12 RX ADMIN — PIPERACILLIN AND TAZOBACTAM 3.38 G: 3; .375 INJECTION, POWDER, LYOPHILIZED, FOR SOLUTION INTRAVENOUS at 14:34

## 2022-04-12 RX ADMIN — ACETAMINOPHEN 650 MG: 325 TABLET, FILM COATED ORAL at 14:46

## 2022-04-12 RX ADMIN — METHYLPHENIDATE HYDROCHLORIDE 2.5 MG: 5 TABLET ORAL at 06:43

## 2022-04-12 ASSESSMENT — ACTIVITIES OF DAILY LIVING (ADL)
ADLS_ACUITY_SCORE: 29
ADLS_ACUITY_SCORE: 31
ADLS_ACUITY_SCORE: 31
ADLS_ACUITY_SCORE: 33
ADLS_ACUITY_SCORE: 31
ADLS_ACUITY_SCORE: 29
ADLS_ACUITY_SCORE: 33
ADLS_ACUITY_SCORE: 31
ADLS_ACUITY_SCORE: 29
ADLS_ACUITY_SCORE: 29
ADLS_ACUITY_SCORE: 33
ADLS_ACUITY_SCORE: 29
ADLS_ACUITY_SCORE: 29
ADLS_ACUITY_SCORE: 31
ADLS_ACUITY_SCORE: 33
ADLS_ACUITY_SCORE: 31
ADLS_ACUITY_SCORE: 33
ADLS_ACUITY_SCORE: 33
ADLS_ACUITY_SCORE: 29
ADLS_ACUITY_SCORE: 33

## 2022-04-12 NOTE — PLAN OF CARE
VSS. Disoriented to time and situation but pt states that she knows she is sick. Tried weaning pt from oxygen and she did not tolerate. Added humidity. LS crackles BLL, on 1L NC. BM incontinent x2. Purewick in place, changed and brad cares completed. Mepilex in place on sacrum for blanchable redness .Total care and needs feeding. +1 edema in arms and legs. Possible discharge to TCU today.

## 2022-04-12 NOTE — PROGRESS NOTES
"BP (!) 140/79 (BP Location: Right arm)   Pulse 95   Temp 99.5  F (37.5  C) (Oral)   Resp 20   Ht 1.6 m (5' 3\")   Wt 71 kg (156 lb 9.6 oz)   SpO2 93%   BMI 27.74 kg/m      AOx2. Disoriented to place and day. LS diminished with crackles bilaterally. 1L NC humidified. Incontinent this morning, brief and purewick changed and brad cares completed. Mepilex placed on coccyx, noted reduced blanchable redness. Pt has a skin tear on her left outer bicep that was covered in mepilex and added to LDA. Pts left peripheral IV infiltrated leading to vascular placing new peripheral IV in right forearm. +2 edema in left arm. +1 edema in right arm and LE. Possible discharge to TCU today depending on placement, see SW note.   "

## 2022-04-12 NOTE — PROGRESS NOTES
Alert to self only. Lethargic, but responds to questions. LS crackles. Up with assist of 2 on mechanical lift. Up to the recliner and patient was more alert while sitting up in the recliner and interact more. Minced moist diet with mod thick liquids. Appetite slightly better.  incontinent of B&B. Purwick intact.  Bladder scanned for 114 ml. Mapilex to coccyx for protection, no open areas noted. deneid pain and discomfort. K lab ordered.  Continue to monitor.

## 2022-04-12 NOTE — PROGRESS NOTES
[facility]    Medicine Progress Note - Hospitalist Service    Date of Admission:  4/4/2022    Assessment & Plan     Olga Bailey is a 76 year old female with history of glioblastoma multiforme of the left parietal region who underwent craniotomy and complete resection in March 2021 as well as radiation and chemotherapy.  Since then she had an episode of PJP pneumonia and retroperitoneal hematoma requiring blood transfusion complicated by TRALI.  She also has history of seizure disorder on Keppra, depressive disorder, and uncomplicated asthma. She is currently residing in a nursing home and was brought to the [facility] emergency department on 4/4/22 for evaluation for slowly progressive generalized weakness.       Patient stated that she had generalized weakness which had gradually gotten worse.  She had some right-sided weakness.  She also states that her weakness also shifted to the left side.  She had no cough or shortness of breath.  She denied fever, vomiting, diarrhea, and urinary symptoms.  She had significantly decreased oral intake.      EMS was called and she was noted to be hypoxic with oxygen saturation 88%.  She was given neb treatment.  She was brought to emergency room for evaluation.  On arrival to emergency room, her vital signs showed  temperature 99.9, pulse 94, blood pressure 113/74, oxygen saturation 97% on 3 L.  Laboratory work-up showed sodium 139, potassium 3.2, creatinine 1.42, , lactic acid 0.9, WBC 5.8, hemoglobin 9.7.  Procalcitonin 0.26.  Chest x-ray showed mild alveolar infiltrate in the left midlung.  MRI of the brain showed no evidence of acute ischemia or hemorrhage.  Stable postoperative change in the left side craniotomy and tumor resection.  MRA of the brain/neck negative for high-grade stenosis.     She was admitted to the hospital for further management including IV fluids and IV antibiotics for pneumonia.  Neurology was  contacted by phone  given unremarkable MRI/MRA was not thought to have had a stroke.  Ceftriaxone and doxycycline were started.  IV fluids were started. Olga was admitted to the hospital for further cares.  After admission she developed high fevers.  She remained lethargic and developed worsening confusion.  Some concern about aspiration developed so she was seen by speech therapy.  Modified diet was ordered.  Ceftriaxone was changed to Zosyn to cover aspiration.  Video swallow study was performed and modified diet was continued.      Problem list:     1. Sepsis and acute hypoxic respiratory failure, secondary to community acquired pneumonia versus aspiration pneumonia on top of pre-existing lung fibrosis.    High resolution chest CT scan (4/8) shows interstitial pneumonia on top of pre-existing interstitial fibrosis.    There was concern for aspiration, antibiotic changed from ceftriaxone to Zosyn on 4/6 will finish the course 4/7.  Finished 5 days of doxycycline on 4/9.    Pulmonary consult appreciated.  Sputum culture, Fungitell and respiratory viral panel negative till now.    2. Metabolic encephalopathy.      Secondary to pneumonia/respiratory failure.    Polypharmacy also likely contributing, the dose of Seroquel decreased to 25 mg and will stop today.  Mental status is better today.  We will continue to decrease Prozac/Wellbutrin.  3. Dysphagia:     Speech therapy consult appreciated, Video swallow study done and showed penetration but no aspiration.    Minced and moist diet with mildly thick liquids ordered.  4. Hypokalemia: Replace per protocol.  5. History of glioblastoma multiforme in the left parietal region status post complete resection in March 2021: Follows with [facility].  Is on radiation 5/2021.  Started adjuvant therapy with temozolomide which was complicated by hematologic toxicity so changed to metronomic/daily dosing and 7/2021 which was tolerated and showed positive treatment  effects.  She has completed 6 months of adjuvant temozolomide as of 12/2021.  Follow-up imaging 2/2022 without concern and plan to remain off treatment and on imaging surveillance moving forward.  6. History of seizure disorder: On Keppra, resumed.  7. Chronic kidney disease:     Baseline creatinine around 1.3-1.45, currently less than 1.     I stopped IV fluid as he is developing some edema  8. Depression, Deconditioning and Poor functional status: Continue prior to admission BuSpar, fluoxetine, Ritalin, olanzapine, and Seroquel.  Seroquel decreased from 50 mg to 25 mg.  She is followed by PMR and palliative care as an outpatient.  She has been noted to have declined in her functional status due to poorly controlled depression and lack of inspiration/motivation with chronic fatigue and neurological deficits.     PT and OT consulted.  PT deferring care given current status.    Palliative consult appreciated.  Patient was changed to DNR/DNI and wishes to enroll in hospice at time of discharge to avoid future hospitalizations.  But later the plan was changed to continue current treatment and code was changed back to full code as daughter did not feel that making DNR/DNI and hospice were ethical considering patient's altered mental status.  9. History of bilateral DVT and retroperitoneal hemorrhage while on anticoagulation now with IVC filter: Found to have bilateral DVT during hospitalization last year and started on enoxaparin which was complicated by right-sided retroperitoneal hemorrhage requiring transfusion support.  She is now on anticoagulation with Xarelto.  10. Acute on chronic anemia and Pancytopenia:    Hemoglobin  remained stable at around 8 and no active bleeding.    CT on 4/8 did not show retroperitoneal hematoma.          Diet: Minced & Moist Diet (level 5) Mildly Thick (level 2)    DVT Prophylaxis: DOAC  Martino Catheter: Not present  Central Lines: None  Cardiac Monitoring: None  Code Status: Full Code       Disposition Plan   Expected Discharge: 3 to 5 days  Anticipated discharge location: assisted living  with home care versus long-term care         The patient's care was discussed with the patient and bedside nurse.    Buddy Ledbetter MD  Hospitalist Service  [facility]  Securely message with the Vocera Web Console (learn more here)  Text page via [facility] Paging/Directory         Clinically Significant Risk Factors Present on Admission                # Severe Malnutrition: based on nutrition assessment     ______________________________________________________________________    Interval History   Mental status is improved today and ate slightly better.  I updated patient's daughter    Data reviewed today: I reviewed all medications, new labs and imaging results over the last 24 hours.    Physical Exam   Vital Signs: Temp: 98.9  F (37.2  C) Temp src: Oral BP: 120/74 Pulse: 106   Resp: 18 SpO2: 94 % O2 Device: Nasal cannula with humidification Oxygen Delivery: 1 LPM  Weight: 156 lbs 9.6 oz  GENERAL: Mental status is better today.  Was able to tell me the name of the hospital and what year it is...  EYES: PERRLA, Normal conjunctiva.  HEART:  Regular rate and rhythm. No JVD. Pulses normal. No edema.  LUNGS: Bilateral crackles.    ABDOMEN:  Soft, no hepatosplenomegaly, normal bowel sounds.  EXTREMETIES: No clubbing, cyanosis or ischemia  SKIN:  Dry to touch, No rash.  Neurological examination nonfocal.      Data   Recent Labs   Lab 04/10/22  0639 04/09/22  1546 04/09/22  0718 04/08/22  1618 04/08/22  0657 04/07/22  1505 04/07/22  0641 04/06/22  0643   WBC 5.1  --   --   --  4.0  --   --  4.3   HGB 8.0*  --   --   --  8.1*  --   --  8.5*   MCV 99  --   --   --  100  --   --  103*     --   --   --  152  --   --  150     --   --   --  141  --  142 143   POTASSIUM 3.4 3.5 3.4   < > 3.3*   < > 3.3*  3.2* 3.5   CHLORIDE 110*  --   --   --  108  --  110* 111*   CO2 28  --   --   --  27  --   26 28   BUN 17  --   --   --  15  --  14 21   CR 0.95  --  1.00  --  1.13*  --  1.00 1.11*   ANIONGAP 3  --   --   --  6  --  6 4   ESTIVEN 8.8  --   --   --  8.6  --  8.8 8.8   *  --   --   --  114*  --  120* 101*    < > = values in this interval not displayed.

## 2022-04-12 NOTE — PLAN OF CARE
VSS, slight temp of 99.9, LS are diminished, 98% on 1L.Pt was fed by staff, and ate 50% of her dinner. Pt daughter here to visit. POC reviewed. Plan for pos discharge to TCU tomorrow.

## 2022-04-12 NOTE — PROGRESS NOTES
CLINICAL NUTRITION SERVICES - REASSESSMENT NOTE     Nutrition Prescription    Malnutrition Status:    % Intake: </= 50% for >/= 5 days (severe)  % Weight Loss: > 5% in 1 month (severe)  Subcutaneous Fat Loss: Upper arm:  Moderate, orbital- moderate  Muscle Loss: Temporal:  Moderate, Thoracic region (clavicle, acromium bone, deltoid, trapezius, pectoral):  moderate, Upper arm (bicep, tricep):  Moderate to severe and Patellar region:  moderate  Fluid Accumulation/Edema: None noted    Malnutrition Diagnosis: Severe malnutrition in the context of chronic illness- previously noted on 4/5, remains current    Recommendations already ordered by Registered Dietitian (RD):  Medical food supplement therapy - discussed with RN, RN feels patient might like Magic Cup    Future/Additional Recommendations:  Monitor intake, weight trends, supplement acceptance     EVALUATION OF THE PROGRESS TOWARD GOALS   Diet: Minced and Moist (level 5) and mildly thick (level 2), total feed  Intake: 25-75% of meals, typically 25-50%       NEW FINDINGS   Pt was very tired and did not communicate with this writer. Spoke with RN who reports patient is very fatigued. She reports patient dislikes the Ensure so they were discontinued. Pt's appetite remains poor.    Weight: weight variability noted, trace edema, all weights are bedscale, which may be inaccurate  Vitals:    04/04/22 1931 04/05/22 0100 04/08/22 0600 04/09/22 0503   Weight: 68 kg (150 lb) 60.7 kg (133 lb 14.4 oz) 68.6 kg (151 lb 4.8 oz) 70 kg (154 lb 6.4 oz)    04/10/22 0543 04/11/22 0634 04/12/22 0403   Weight: 66.7 kg (147 lb 1.6 oz) 70.8 kg (156 lb) 71 kg (156 lb 9.6 oz)     Labs: reviewed    GI: LBM 4/10    Skin: No concerns noted    Meds:     amLODIPine  5 mg Oral Daily     busPIRone HCl  30 mg Oral BID     FLUoxetine  60 mg Oral Daily     furosemide  20 mg Intravenous Once     levETIRAcetam  1,000 mg Oral BID     levETIRAcetam  250 mg Oral BID     methylphenidate  2.5 mg Oral QAM AC      mirabegron  25 mg Oral Daily     mirtazapine  15 mg Oral At Bedtime     multivitamin, therapeutic  1 tablet Oral Daily     pantoprazole  40 mg Oral BID AC     piperacillin-tazobactam  3.375 g Intravenous Q6H     rivaroxaban ANTICOAGULANT  15 mg Oral Daily with supper     sodium chloride (PF)  3 mL Intracatheter Q8H        - MEDICATION INSTRUCTIONS -          MALNUTRITION  As noted above    Previous Goals   Patient to consume % of nutritionally adequate meal trays TID, or the equivalent with supplements/snacks.  Evaluation: Not met    Previous Nutrition Diagnosis  Inadequate oral intake related to acute on chronic illness as evidenced by decreased intake, weight loss, muscle and fat loss.   Evaluation: No change    CURRENT NUTRITION DIAGNOSIS  Inadequate oral intake related to acute on chronic illness as evidenced by decreased intake, weight loss, muscle and fat loss.     INTERVENTIONS  Implementation  Medical food supplement therapy - discussed with RN, RN feels patient might like Magic Cup    Goals  Patient to consume % of nutritionally adequate meal trays TID, or the equivalent with supplements/snacks.    Monitoring/Evaluation  Progress toward goals will be monitored and evaluated per protocol.    Johanna Jett RD, LD  Pager - 3rd floor/ICU: [phone]  Pager - All other floors: [phone]  Pager - Weekend/holiday: [phone]  Office: [phone]

## 2022-04-12 NOTE — PROGRESS NOTES
Care Management Follow Up    Length of Stay (days): 8    Expected Discharge Date: 04/13/2022     Concerns to be Addressed: care coordination/care conferences, discharge planning     Patient plan of care discussed at interdisciplinary rounds: Yes    Anticipated Discharge Disposition: SNF/TCU     Anticipated Discharge Services: None  Anticipated Discharge DME: None    Patient/family educated on Medicare website which has current facility and service quality ratings: yes  Education Provided on the Discharge Plan: yes    Patient/Family in Agreement with the Plan: yes    Referrals Placed by CM/SW: External Care Coordination; post acute facilities  Private pay costs discussed: private room/amenity fees and transportation costs    Additional Information:  Sw received a message from the pt's dtr La Nena, requesting that TCU referrals be sent to [facility] and [facility].  Sw sent the TCU referrals.    Sw will continue with discharge planning and will be available as needed until discharge.      MACARIO Perez, [facility]  Inpatient Care Coordination  [facility]  [phone]

## 2022-04-13 ENCOUNTER — APPOINTMENT (OUTPATIENT)
Dept: SPEECH THERAPY | Facility: CLINIC | Age: 77
DRG: 871 | End: 2022-04-13
Payer: MEDICARE

## 2022-04-13 ENCOUNTER — APPOINTMENT (OUTPATIENT)
Dept: GENERAL RADIOLOGY | Facility: CLINIC | Age: 77
DRG: 871 | End: 2022-04-13
Attending: HOSPITALIST
Payer: MEDICARE

## 2022-04-13 LAB
ANION GAP SERPL CALCULATED.3IONS-SCNC: 7 MMOL/L (ref 3–14)
BASE EXCESS BLDA CALC-SCNC: 4.3 MMOL/L (ref -9–1.8)
BASOPHILS # BLD AUTO: 0 10E3/UL (ref 0–0.2)
BASOPHILS NFR BLD AUTO: 0 %
BUN SERPL-MCNC: 10 MG/DL (ref 7–30)
CALCIUM SERPL-MCNC: 8.5 MG/DL (ref 8.5–10.1)
CHLORIDE BLD-SCNC: 111 MMOL/L (ref 94–109)
CO2 SERPL-SCNC: 23 MMOL/L (ref 20–32)
CREAT SERPL-MCNC: 0.91 MG/DL (ref 0.52–1.04)
EOSINOPHIL # BLD AUTO: 0.3 10E3/UL (ref 0–0.7)
EOSINOPHIL NFR BLD AUTO: 5 %
ERYTHROCYTE [DISTWIDTH] IN BLOOD BY AUTOMATED COUNT: 12.3 % (ref 10–15)
GFR SERPL CREATININE-BSD FRML MDRD: 65 ML/MIN/1.73M2
GLUCOSE BLD-MCNC: 97 MG/DL (ref 70–99)
HCO3 BLD-SCNC: 27 MMOL/L (ref 21–28)
HCT VFR BLD AUTO: 23.8 % (ref 35–47)
HGB BLD-MCNC: 7.4 G/DL (ref 11.7–15.7)
HOLD SPECIMEN: NORMAL
IMM GRANULOCYTES # BLD: 0 10E3/UL
IMM GRANULOCYTES NFR BLD: 1 %
LYMPHOCYTES # BLD AUTO: 0.3 10E3/UL (ref 0.8–5.3)
LYMPHOCYTES NFR BLD AUTO: 5 %
MCH RBC QN AUTO: 31.6 PG (ref 26.5–33)
MCHC RBC AUTO-ENTMCNC: 31.1 G/DL (ref 31.5–36.5)
MCV RBC AUTO: 102 FL (ref 78–100)
MONOCYTES # BLD AUTO: 0.4 10E3/UL (ref 0–1.3)
MONOCYTES NFR BLD AUTO: 8 %
MRSA DNA SPEC QL NAA+PROBE: NEGATIVE
NEUTROPHILS # BLD AUTO: 4.3 10E3/UL (ref 1.6–8.3)
NEUTROPHILS NFR BLD AUTO: 81 %
NRBC # BLD AUTO: 0 10E3/UL
NRBC BLD AUTO-RTO: 0 /100
NT-PROBNP SERPL-MCNC: 335 PG/ML (ref 0–1800)
O2/TOTAL GAS SETTING VFR VENT: 2 %
PCO2 BLD: 34 MM HG (ref 35–45)
PH BLD: 7.51 [PH] (ref 7.35–7.45)
PLATELET # BLD AUTO: 185 10E3/UL (ref 150–450)
PO2 BLD: 63 MM HG (ref 80–105)
POTASSIUM BLD-SCNC: 3.3 MMOL/L (ref 3.4–5.3)
POTASSIUM BLD-SCNC: 3.6 MMOL/L (ref 3.4–5.3)
POTASSIUM BLD-SCNC: 3.7 MMOL/L (ref 3.4–5.3)
PROCALCITONIN SERPL-MCNC: 0.1 NG/ML
PROCALCITONIN SERPL-MCNC: 0.12 NG/ML
RBC # BLD AUTO: 2.34 10E6/UL (ref 3.8–5.2)
SA TARGET DNA: NEGATIVE
SODIUM SERPL-SCNC: 141 MMOL/L (ref 133–144)
WBC # BLD AUTO: 5.4 10E3/UL (ref 4–11)

## 2022-04-13 PROCEDURE — 258N000003 HC RX IP 258 OP 636: Performed by: HOSPITALIST

## 2022-04-13 PROCEDURE — 250N000013 HC RX MED GY IP 250 OP 250 PS 637: Performed by: INTERNAL MEDICINE

## 2022-04-13 PROCEDURE — 83880 ASSAY OF NATRIURETIC PEPTIDE: CPT | Performed by: HOSPITALIST

## 2022-04-13 PROCEDURE — 82803 BLOOD GASES ANY COMBINATION: CPT | Performed by: INTERNAL MEDICINE

## 2022-04-13 PROCEDURE — 80048 BASIC METABOLIC PNL TOTAL CA: CPT | Performed by: STUDENT IN AN ORGANIZED HEALTH CARE EDUCATION/TRAINING PROGRAM

## 2022-04-13 PROCEDURE — 36415 COLL VENOUS BLD VENIPUNCTURE: CPT | Performed by: STUDENT IN AN ORGANIZED HEALTH CARE EDUCATION/TRAINING PROGRAM

## 2022-04-13 PROCEDURE — 84145 PROCALCITONIN (PCT): CPT | Performed by: INTERNAL MEDICINE

## 2022-04-13 PROCEDURE — 250N000011 HC RX IP 250 OP 636: Performed by: STUDENT IN AN ORGANIZED HEALTH CARE EDUCATION/TRAINING PROGRAM

## 2022-04-13 PROCEDURE — 99232 SBSQ HOSP IP/OBS MODERATE 35: CPT | Performed by: INTERNAL MEDICINE

## 2022-04-13 PROCEDURE — 85025 COMPLETE CBC W/AUTO DIFF WBC: CPT | Performed by: STUDENT IN AN ORGANIZED HEALTH CARE EDUCATION/TRAINING PROGRAM

## 2022-04-13 PROCEDURE — 999N000157 HC STATISTIC RCP TIME EA 10 MIN

## 2022-04-13 PROCEDURE — 99233 SBSQ HOSP IP/OBS HIGH 50: CPT | Performed by: STUDENT IN AN ORGANIZED HEALTH CARE EDUCATION/TRAINING PROGRAM

## 2022-04-13 PROCEDURE — 250N000011 HC RX IP 250 OP 636: Performed by: INTERNAL MEDICINE

## 2022-04-13 PROCEDURE — 36600 WITHDRAWAL OF ARTERIAL BLOOD: CPT

## 2022-04-13 PROCEDURE — 71045 X-RAY EXAM CHEST 1 VIEW: CPT

## 2022-04-13 PROCEDURE — 250N000013 HC RX MED GY IP 250 OP 250 PS 637: Performed by: STUDENT IN AN ORGANIZED HEALTH CARE EDUCATION/TRAINING PROGRAM

## 2022-04-13 PROCEDURE — 92526 ORAL FUNCTION THERAPY: CPT | Mod: GN | Performed by: SPEECH-LANGUAGE PATHOLOGIST

## 2022-04-13 PROCEDURE — 250N000013 HC RX MED GY IP 250 OP 250 PS 637: Performed by: HOSPITALIST

## 2022-04-13 PROCEDURE — 36415 COLL VENOUS BLD VENIPUNCTURE: CPT | Performed by: INTERNAL MEDICINE

## 2022-04-13 PROCEDURE — 258N000003 HC RX IP 258 OP 636: Performed by: INTERNAL MEDICINE

## 2022-04-13 PROCEDURE — 120N000001 HC R&B MED SURG/OB

## 2022-04-13 PROCEDURE — 87040 BLOOD CULTURE FOR BACTERIA: CPT | Performed by: HOSPITALIST

## 2022-04-13 PROCEDURE — 87641 MR-STAPH DNA AMP PROBE: CPT | Performed by: INTERNAL MEDICINE

## 2022-04-13 PROCEDURE — 250N000011 HC RX IP 250 OP 636: Performed by: HOSPITALIST

## 2022-04-13 PROCEDURE — 36415 COLL VENOUS BLD VENIPUNCTURE: CPT | Performed by: HOSPITALIST

## 2022-04-13 PROCEDURE — 84132 ASSAY OF SERUM POTASSIUM: CPT | Performed by: STUDENT IN AN ORGANIZED HEALTH CARE EDUCATION/TRAINING PROGRAM

## 2022-04-13 PROCEDURE — 84145 PROCALCITONIN (PCT): CPT | Performed by: STUDENT IN AN ORGANIZED HEALTH CARE EDUCATION/TRAINING PROGRAM

## 2022-04-13 RX ORDER — FUROSEMIDE 10 MG/ML
20 INJECTION INTRAMUSCULAR; INTRAVENOUS EVERY 12 HOURS
Status: DISCONTINUED | OUTPATIENT
Start: 2022-04-13 | End: 2022-04-14

## 2022-04-13 RX ORDER — POTASSIUM CHLORIDE 20MEQ/15ML
40 LIQUID (ML) ORAL ONCE
Status: COMPLETED | OUTPATIENT
Start: 2022-04-13 | End: 2022-04-13

## 2022-04-13 RX ORDER — LIDOCAINE 40 MG/G
CREAM TOPICAL
Status: DISCONTINUED | OUTPATIENT
Start: 2022-04-13 | End: 2022-04-13

## 2022-04-13 RX ADMIN — PIPERACILLIN AND TAZOBACTAM 3.38 G: 3; .375 INJECTION, POWDER, LYOPHILIZED, FOR SOLUTION INTRAVENOUS at 14:46

## 2022-04-13 RX ADMIN — POTASSIUM CHLORIDE 40 MEQ: 1.5 SOLUTION ORAL at 02:33

## 2022-04-13 RX ADMIN — PANTOPRAZOLE SODIUM 40 MG: 40 TABLET, DELAYED RELEASE ORAL at 06:40

## 2022-04-13 RX ADMIN — AMLODIPINE BESYLATE 5 MG: 5 TABLET ORAL at 08:17

## 2022-04-13 RX ADMIN — ACETAMINOPHEN 650 MG: 325 TABLET, FILM COATED ORAL at 15:19

## 2022-04-13 RX ADMIN — BUSPIRONE HYDROCHLORIDE 30 MG: 15 TABLET ORAL at 20:54

## 2022-04-13 RX ADMIN — MIRTAZAPINE 15 MG: 15 TABLET, FILM COATED ORAL at 22:24

## 2022-04-13 RX ADMIN — RIVAROXABAN 15 MG: 15 TABLET, FILM COATED ORAL at 17:01

## 2022-04-13 RX ADMIN — ACETAMINOPHEN 650 MG: 325 TABLET, FILM COATED ORAL at 02:42

## 2022-04-13 RX ADMIN — BUSPIRONE HYDROCHLORIDE 30 MG: 15 TABLET ORAL at 08:16

## 2022-04-13 RX ADMIN — LEVETIRACETAM 250 MG: 250 TABLET, FILM COATED ORAL at 08:21

## 2022-04-13 RX ADMIN — FLUOXETINE 40 MG: 20 CAPSULE ORAL at 08:16

## 2022-04-13 RX ADMIN — VANCOMYCIN HYDROCHLORIDE 1250 MG: 10 INJECTION, POWDER, LYOPHILIZED, FOR SOLUTION INTRAVENOUS at 22:24

## 2022-04-13 RX ADMIN — PIPERACILLIN AND TAZOBACTAM 3.38 G: 3; .375 INJECTION, POWDER, LYOPHILIZED, FOR SOLUTION INTRAVENOUS at 02:34

## 2022-04-13 RX ADMIN — VANCOMYCIN HYDROCHLORIDE 1500 MG: 10 INJECTION, POWDER, LYOPHILIZED, FOR SOLUTION INTRAVENOUS at 04:36

## 2022-04-13 RX ADMIN — PIPERACILLIN AND TAZOBACTAM 3.38 G: 3; .375 INJECTION, POWDER, LYOPHILIZED, FOR SOLUTION INTRAVENOUS at 08:17

## 2022-04-13 RX ADMIN — METHYLPHENIDATE HYDROCHLORIDE 2.5 MG: 5 TABLET ORAL at 06:40

## 2022-04-13 RX ADMIN — FUROSEMIDE 20 MG: 10 INJECTION, SOLUTION INTRAMUSCULAR; INTRAVENOUS at 20:47

## 2022-04-13 RX ADMIN — FUROSEMIDE 20 MG: 10 INJECTION, SOLUTION INTRAMUSCULAR; INTRAVENOUS at 08:17

## 2022-04-13 RX ADMIN — LEVETIRACETAM 250 MG: 250 TABLET, FILM COATED ORAL at 20:53

## 2022-04-13 RX ADMIN — ACETAMINOPHEN 650 MG: 325 TABLET, FILM COATED ORAL at 08:17

## 2022-04-13 RX ADMIN — THERA TABS 1 TABLET: TAB at 08:17

## 2022-04-13 RX ADMIN — PANTOPRAZOLE SODIUM 40 MG: 40 TABLET, DELAYED RELEASE ORAL at 17:01

## 2022-04-13 RX ADMIN — PIPERACILLIN AND TAZOBACTAM 3.38 G: 3; .375 INJECTION, POWDER, LYOPHILIZED, FOR SOLUTION INTRAVENOUS at 21:02

## 2022-04-13 RX ADMIN — LEVETIRACETAM 1000 MG: 500 TABLET, FILM COATED ORAL at 20:53

## 2022-04-13 RX ADMIN — MIRABEGRON 25 MG: 25 TABLET, FILM COATED, EXTENDED RELEASE ORAL at 08:16

## 2022-04-13 RX ADMIN — LEVETIRACETAM 1000 MG: 500 TABLET, FILM COATED ORAL at 08:16

## 2022-04-13 ASSESSMENT — ACTIVITIES OF DAILY LIVING (ADL)
ADLS_ACUITY_SCORE: 35
ADLS_ACUITY_SCORE: 28
ADLS_ACUITY_SCORE: 28
ADLS_ACUITY_SCORE: 27
ADLS_ACUITY_SCORE: 37
ADLS_ACUITY_SCORE: 29
ADLS_ACUITY_SCORE: 27
ADLS_ACUITY_SCORE: 29
ADLS_ACUITY_SCORE: 27
ADLS_ACUITY_SCORE: 29
ADLS_ACUITY_SCORE: 27
ADLS_ACUITY_SCORE: 28
ADLS_ACUITY_SCORE: 27
ADLS_ACUITY_SCORE: 27
ADLS_ACUITY_SCORE: 37
ADLS_ACUITY_SCORE: 27
ADLS_ACUITY_SCORE: 27
ADLS_ACUITY_SCORE: 29
ADLS_ACUITY_SCORE: 27
ADLS_ACUITY_SCORE: 37
ADLS_ACUITY_SCORE: 28
ADLS_ACUITY_SCORE: 37
ADLS_ACUITY_SCORE: 37
ADLS_ACUITY_SCORE: 27
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
## 2022-04-13 NOTE — CONSULTS
Consult Date: 04/13/2022    INFECTIOUS DISEASE CONSULTATION    LOCATION:  Room 301, [facility].     REFERRING PHYSICIAN:  Dr. Ledbetter    IMPRESSION:     1.  A 76-year-old female, well known to me from previous hospitalization, currently admitted with some respiratory symptoms, infiltrate and fever, found to have probable pneumonia, on antibiotics and seemingly improving, but now with further recurrent fever, possible issues to be some type of new nosocomial process, resistant pathogen or, I think most likely. Recurrent aspiration, now either a new chemical pneumonitis or possibly infection.  2.  Overall, progressive deterioration over the last year, but fairly stable.  This includes some degree of neurologic symptoms currently, including not being able to get up.  Imaging was negative, but seemingly out of proportion to any infection issue.  Very weak.  3.  One year ago, pneumocystis pneumonia as presumptive diagnosis, but data all fit and responded to a course of Bactrim. At that time, was major immunosuppression; currently not on that.  Fungitell negative currently. This is an unlikely diagnosis and imaging does not fit either.  4.  Glioblastoma with prior resection.  No clear cut further worsening; previously on chemo, but off currently.  5.  Some chronic pulmonary issues, was seen by Pulmonary earlier in the hospital stay.    RECOMMENDATIONS:     1.  Probably not needing vancomycin if blood cultures are negative and nares MRSA negative, probably discontinue this soon.  2.  Currently getting Zosyn, not clear that this has failed, per se. Potentially, new fever as a new issue or recurrent aspiration, either chemical or bacterial.  Await new cultures and adjust.  3.  Daughter wondering about steroids for the lungs.  No objection to this.  We did that last year when she had pneumocystis and her perception was that was what really made it better.  That was, of course, a different situation, not a clear  indication for steroids here.  Would defer this to Pulmonary Medicine or Medicine itself.  4.  If not clinically improving, reconsider options.  5.  Even though swallowing study not that abnormal, the clinical picture including what the daughter has observed fits with aspiration.    HISTORY OF PRESENT ILLNESS:  This 76-year-old female is seen in consultation and is known to us previously.  Last year, she had glioblastoma surgery.  Postop, she was on high dose steroids and getting chemo and developed an acute respiratory illness, failed to respond to antibiotics and eventually it was evident that this was some other infection.  The imaging, the Fungitell and LDH all fit with pneumocystis.  We did not prove the diagnosis, but treated with Septra and steroids and she rapidly improved and this resolved.  She was then on prophylactic Septra until chemo and steroids were completed.  She has been off that for many months now without any issues  currently, has been fairly stable, but some mild progression of her symptoms and weakness and so forth have occurred over time. For the most part, though, has not had major infection problems.  The daughter has noticed some degree of cough intermittently and some issue with swallowing and eating suggestive of possible aspiration.  On this occasion at admission, she had mainly neurologic symptoms  initially, no particular discussion in the initial notes about infection, but was noted to be somewhat hypoxic and eventually some fever was noted.  She was started on broad antibiotics as though treating pneumonia, eventually was switched over from ceftriaxone to Zosyn with consideration of aspiration.  She has had no positive micro.  She seemed to be clinically improving, still on 1 liter of oxygen but in the last 48 hours, has had increased fever again.  Repeat cultures done and pending.  Imaging shows possibly more infiltrate.  No significant other localizing symptoms including no new  diarrhea.  She has had no recent travel exposures and her immunosuppression level should be low at present.    PAST MEDICAL HISTORY:  Prior Pneumocystis, the glioblastoma, now off treatment.    ALLERGIES:  MULTIPLE LISTED ALLERGIES, AMONG THEM ERYTHROMYCIN.    MEDICATIONS:  As listed.    SOCIAL AND FAMILY HISTORY:  No travel or exposures.  No COVID-19 exposures.  No animal exposures.    REVIEW OF SYSTEMS:  Largely as above.  Somewhat sleepy, but able to interact quite well.  Denies any other localizing symptoms.  Feels very weak, both neurologically weak and generalized weakness.  Did notice the fever in the last 24 hours with mild chills.    PHYSICAL EXAMINATION:    GENERAL:  The patient appears her stated age, looks chronically more than acutely ill, but is hypoxic on oxygen, mildly tachycardic in the 90s.  HEENT:  Unremarkable.  HEART AND LUNGS:  Fairly unremarkable though crackles and rhonchi in both lungs.  ABDOMEN:  Nontender.  EXTREMITIES:  No rash or skin lesions.    LABORATORY DATA:  Procalcitonin just ordered and pending.  No new positive micro.  Imaging shows infiltrates.    Thank you very much for the consultation.  I will follow the patient with you.    Donny Sanabria MD        D: 2022   T: 2022   MT: [id number]    Name:     RADHA RYAN  MRN:      [id number]        Account:      [id number]   :      1945           Consult Date: 2022     Document: [id number]

## 2022-04-13 NOTE — PLAN OF CARE
Pt oriented to self, lethargic. VS changes this shift; increased O2 needs from 1L to 5L, m-temp 100.4 axillary (improved w/ ice packs/cold rags and PRN tylenol), tachypneic; MD paged (see note for interventions). VSS on 3L O2 per NC. Denied pain. K+ replaced per protocol w/ recheck 4/13 @ 0730. Denied CP. PIV to right intact, SL. Turn q2h. Purewick in place for incontinence. Maintained seizure precautions. Discharge pending TCU placement. Will continue POC.    Goal Outcome Evaluation:    Plan of Care Reviewed With: patient     Overall Patient Progress: declining

## 2022-04-13 NOTE — PLAN OF CARE
"BP (!) 144/71 (BP Location: Right arm)   Pulse 104   Temp (!) 102.4  F (39.1  C) (Axillary)   Resp 22   Ht 1.6 m (5' 3\")   Wt 68.2 kg (150 lb 4.8 oz)   SpO2 93%   BMI 26.62 kg/m        A&Ox2. A2 with lift, frequent repos. PT/OT working with pt. On 1L O2 via NC. Tolerating diet well, poor appetite. Purewick in place. Mepilex to sacrum. Large BM today. Getting IV Zosyn for PNA. Palliative following, see note. Pulmonary following, see note. ID consulted. Will continue with course of ABX. Daughter at bedside today, requesting PICC line, vascular access involved and states we will wait results of BC and MRSA/MSSA swab. Will continue POC.     1500- pt temp 102.4, tylenol given and ice pack applied.   "

## 2022-04-13 NOTE — PROVIDER NOTIFICATION
"MD paged: \"Update: Pt has 100.2 axillary temp, gave Tylenol, has ice packs on. Now on 4-5LO2 NC, up from 1L. O2 89-91%. Tachypneic. Please advise as needed, thx\"  "

## 2022-04-13 NOTE — PROGRESS NOTES
[facility]  Palliative Care Progress Note  Text Page     Assessment & Plan   Recommendations:  1. Goals of Care- Full Code  Hospitalization goals discussed with patient and daughter La Nena.  Reviewed goal for restorative cares and FULL code.  Reviewed multiple comorbid factors including pulmonary etiology and dysphagia as issues that will require ongoing hospitalization.       Decisional Capacity- Intact. Patient has an advance directive dated 01/21/2016.  If patient becomes unable to demonstrate decisional capacity, her three children, Dario, Sudheer and La Nena are joint health care agents.    - goal for discharge onto hospice, ongoing medical management while inpatient.     2. Fatigue/weakness  Progressive fatigue and weakness which is one cause for current admission.  Difficulty with mobility and max assist.  Inability to mobilize due to new onset dyspnea with increased oxygen requirements.  - ongoing physical and occupational therapies to determine disposition.  - continued work with mobility needed for optimization of function and quality of life prior to discharge.     3. Dyspnea  Pneumonia with sepsis found on work up, history of asthma.  Pulmonary consult to evaluate for ILD in the setting of previous PCP.  Possible aspiration pna v fluid overload as contributing factors, work up pending.  - recommend continued work up and interventions as proposed by Pulmonology and Hospitalist teams.      4. Dysphagia  Recent history of dysphagia with choking episodes during hospitalization.  Modified diet at baseline.  Underwent video swallow, see SLP notes.  - SLP consult, appreciate recommendations.  - changed diet to Level 5 texture and Level 3, moderately thickened fluid today  - Up in chair for meals with small sips/bites to avoid pocketing of food      5. Aphasia  Baseline findings on exam, secondary to previous GBM, surgical resection and Oncologic treatments.  Requires time during  "assessments to express known needs and wishes.     6. Spiritual Care  Oriented to Spiritual Health as part of Palliative Care team. Consultation placed for  to follow.  Spiritual Background: not specified.     7. Care Planning  Appreciate Care of Nicole Qiu.  - goal for TCU at discharge once medically stable.       Medical Decision Making and Goals of Care:  Discussed on April 13, 2022 with Lisha DEVRIES CNP:   Met with La Nena and Olga at the bedside.  Olga awakens to conversation, otherwise sleeps during assessment and conversation with La Nena.      Reviewed La Nena's questions regarding oxygen requirements, dietary modification, ongoing medical interventions and plan of care.  Reviewed plan for change to diet order, communication with SLP, communication with nursing, communication with Hospitalist team and ongoing discussion regarding next steps for work up and care.   When Olga interacted she discussed her goal for rehabilitation, but her depressed mood and \"not feeling well\" today.  She stated she wanted to see what resulted from the work up and continue current plan.      Joined by Dr. Ledbetter at the bedside to review medical plans.  He and La Nena reviewed her concerns and further medical management plan established.  La Nena denied further questions or concerns regarding plan of care.  Stated that she wished for her mother to remain FULL code and with full restorative cares at this time.      Lisha DEVRIES CNP  Pain Management and Palliative Care  [facility]  Pgr: [phone]      Time Spent on this Encounter   Total unit/floor time 65 minutes, time consisted of the following, examination of the patient, reviewing the record and completing documentation. >50% of time spent in counseling and coordination of care, Bedside Nurse Hannah and Hospitalist Dr. Ledbetter.  Time spend counseling with patient and family consisted of the following topics, " goals of care, care planning for discharge and symptom management.    Total Visit Time: 65  o For Outpatient, 'Total Visit Time' = Face-to-Face time only.  o For Inpatient, 'Total Visit Time' = Unit Floor + Face-to-Face time.    Total Face-to-Face Prolonged Service Time: 65    Content of the Prolonged Time: goals of care, medical management.      Review of Systems    CONSTITUTIONAL: NEGATIVE for fever, chills, change in weight  ENT/MOUTH: NEGATIVE for ear, mouth and throat problems  RESP: NEGATIVE for significant cough or SOB  CV: NEGATIVE for chest pain, palpitations or peripheral edema    Physical Exam   Temp:  [97  F (36.1  C)-100.8  F (38.2  C)] 98.6  F (37  C)  Pulse:  [] 76  Resp:  [18-32] 24  BP: (120-157)/(66-85) 148/74  SpO2:  [89 %-96 %] 96 %  150 lbs 4.8 oz  Exam:  GEN: lethargic, oriented to place, situation and time, sitting in bed, appears comfortable.  CV:  RRR, S1, S2; no murmurs or other irregularities noted.  +3 DP/PT pulses bilatererally; no edema BLE.  RESP:  Symmetric chest rise on inhalation noted.  Normal respiratory effort.  ABD:  Rounded, soft, non-tender/non-distended.  +BS    NEURO:  Word finding with pauses during conversation and staring ahead. Oriented x4.  Psych:  Fall affect.  Calm, cooperative, minimally conversant.       Medications     - MEDICATION INSTRUCTIONS -         amLODIPine  5 mg Oral Daily     busPIRone HCl  30 mg Oral BID     FLUoxetine  40 mg Oral Daily     furosemide  20 mg Intravenous Q12H     levETIRAcetam  1,000 mg Oral BID     levETIRAcetam  250 mg Oral BID     methylphenidate  2.5 mg Oral QAM AC     mirabegron  25 mg Oral Daily     mirtazapine  15 mg Oral At Bedtime     multivitamin, therapeutic  1 tablet Oral Daily     pantoprazole  40 mg Oral BID AC     piperacillin-tazobactam  3.375 g Intravenous Q6H     rivaroxaban ANTICOAGULANT  15 mg Oral Daily with supper     sodium chloride (PF)  3 mL Intracatheter Q8H       Data   Results for orders placed or  performed during the hospital encounter of 04/04/22 (from the past 24 hour(s))   Potassium   Result Value Ref Range    Potassium 3.3 (L) 3.4 - 5.3 mmol/L   Nt probnp inpatient   Result Value Ref Range    N terminal Pro BNP Inpatient 335 0 - 1,800 pg/mL   XR Chest Port 1 View    Narrative    EXAM: XR CHEST PORT 1 VIEW  LOCATION: [facility]  DATE/TIME: 4/13/2022 5:50 AM    INDICATION: worsening respiratory failure  COMPARISON: 04/04/2022      Impression    IMPRESSION: Heart size is normal. Lung volumes are mildly diminished. There are increased interstitial opacities bilaterally, with relative sparing of the right lower lobe. Differential considerations include asymmetric edema or multifocal interstitial   pneumonitis. No pneumothorax. No acute bony abnormality.   Potassium   Result Value Ref Range    Potassium 3.6 3.4 - 5.3 mmol/L   Extra Tube    Narrative    The following orders were created for panel order Extra Tube.  Procedure                               Abnormality         Status                     ---------                               -----------         ------                     Extra Purple Top Tube[[id number]]                            Final result                 Please view results for these tests on the individual orders.   Extra Purple Top Tube   Result Value Ref Range    Hold Specimen JIC      *Note: Due to a large number of results and/or encounters for the requested time period, some results have not been displayed. A complete set of results can be found in Results Review.

## 2022-04-13 NOTE — PROGRESS NOTES
[facility]    Medicine Progress Note - Hospitalist Service    Date of Admission:  4/4/2022    Assessment & Plan     Olga Bailey is a 76 year old female with history of glioblastoma multiforme of the left parietal region who underwent craniotomy and complete resection in March 2021 as well as radiation and chemotherapy.  Since then she had an episode of PJP pneumonia due to being on steroids and retroperitoneal hematoma requiring blood transfusion complicated by TRALI.  She also has history of seizure disorder on Keppra, depressive disorder, and uncomplicated asthma. She is currently residing in a nursing home and was brought to the [facility] emergency department on 4/4/22 for evaluation for slowly progressive generalized weakness.       Patient stated that she had generalized weakness which had gradually gotten worse.  She had some right-sided weakness.  She also states that her weakness also shifted to the left side.  She had no cough or shortness of breath.  She denied fever, vomiting, diarrhea, and urinary symptoms.  She had significantly decreased oral intake.      EMS was called and she was noted to be hypoxic with oxygen saturation 88%.  She was given neb treatment.  She was brought to emergency room for evaluation.  On arrival to emergency room, her vital signs showed  temperature 99.9, pulse 94, blood pressure 113/74, oxygen saturation 97% on 3 L.  Laboratory work-up showed sodium 139, potassium 3.2, creatinine 1.42, , lactic acid 0.9, WBC 5.8, hemoglobin 9.7.  Procalcitonin 0.26.  Chest x-ray showed mild alveolar infiltrate in the left midlung.  MRI of the brain showed no evidence of acute ischemia or hemorrhage.  Stable postoperative change in the left side craniotomy and tumor resection.  MRA of the brain/neck negative for high-grade stenosis.     She was admitted to the hospital for further management including IV fluids and IV antibiotics for  pneumonia.  Neurology was contacted by phone  given unremarkable MRI/MRA was not thought to have had a stroke.  Ceftriaxone and doxycycline were started. After admission she developed high fevers.  She remained lethargic and developed worsening confusion.  Some concern about aspiration developed so she was seen by speech therapy and modified diet was ordered.  Ceftriaxone was changed to Zosyn to cover aspiration.  Video swallow study was performed and modified diet was continued.     Patient was slowly improving but on 4/12 she had increasing oxygen needs to 5 L/min with confusion and developed fever fever of 100.8  F and vancomycin was added.     Problem list:     1. Sepsis and acute hypoxic respiratory failure, secondary to community acquired pneumonia versus aspiration pneumonia on top of pre-existing lung fibrosis.    High resolution chest CT scan (4/8) shows interstitial pneumonia on top of pre-existing interstitial fibrosis.    There was concern for aspiration, antibiotic changed from ceftriaxone to Zosyn on 4/6 plan was to finish the course 4/13 but she had fever again overnight on 4/12 and vancomycin was added and repeat blood cultures were ordered.  I doubt she has new pneumonia and will check procalcitonin.  If negative antibiotics will be discontinued.  Finished 5 days of doxycycline on 4/9.    Added Lasix 20 mg IV twice daily due to peripheral edema.    Pulmonary consult appreciated.  Sputum culture, Fungitell and respiratory viral panel negative.  Pulmonary will talk to patient's daughter again today as she has she wants to start on steroids.  2. Metabolic encephalopathy.      Secondary to pneumonia/respiratory failure.    Polypharmacy also likely contributing, the dose of Seroquel was decreased and eventually stopped.  Also decrease the dose of fluoxetine from 60mg to 40 mg.  Continue Wellbutrin.   3. Dysphagia:     Speech therapy consult appreciated, Video swallow study done and showed penetration but  no aspiration.    Minced and moist diet with mildly thick liquids ordered as per speech therapy recommendation but patient's daughter is still concerned about her inability to follow this diet and will decrease the consistency to thicker liquids.  4. Hypokalemia: Replace per protocol.  5. History of glioblastoma multiforme in the left parietal region status post complete resection in March 2021: Follows with [facility].  Is on radiation 5/2021.  Started adjuvant therapy with temozolomide which was complicated by hematologic toxicity so changed to metronomic/daily dosing and 7/2021 which was tolerated and showed positive treatment effects.  She has completed 6 months of adjuvant temozolomide as of 12/2021.  Follow-up imaging 2/2022 without concern and plan to remain off treatment and on imaging surveillance moving forward.  6. History of seizure disorder: On Keppra, resumed.  7. Chronic kidney disease:     Baseline creatinine around 1.3-1.45, currently less than 1.     I stopped IV fluid as she is developing some edema  8. Depression, Deconditioning and Poor functional status: Continue prior to admission BuSpar, fluoxetine, Ritalin, olanzapine, and Seroquel.  Seroque stopped she is followed by PMR and palliative care as an outpatient.  She has been noted to have declined in her functional status due to poorly controlled depression and lack of inspiration/motivation with chronic fatigue and neurological deficits.     PT and OT consulted.  PT deferring care given current status.    Palliative consult appreciated.  Patient was changed to DNR/DNI and wishes to enroll in hospice at time of discharge to avoid future hospitalizations.  But later the plan was changed to continue current treatment and code was changed back to full code as daughter did not feel that making DNR/DNI and hospice were ethical considering patient's altered mental status.  9. History of bilateral DVT and retroperitoneal hemorrhage while  on anticoagulation now with IVC filter: Found to have bilateral DVT during hospitalization last year and started on enoxaparin which was complicated by right-sided retroperitoneal hemorrhage requiring transfusion support.  She is now on anticoagulation with Xarelto.  10. Acute on chronic anemia and Pancytopenia:    Hemoglobin  has dropped down to 7.4, likely due to blood draws and poor poor mental function.  Transfuse for less than 7.    CT on 4/8 did not show retroperitoneal hematoma.  11. Goals of care.  We had detailed discussion with patient's daughter Ms. Deutsch at bedside.  She wants to continue with full code for now as she thinks that after signing DNR/DNI the standard of care drops.  She wants to pursue full work-up now and requested to talk to ID regarding persistent fevers and to pulmonary regarding the respiratory status.  She also requested a PICC line but given that she may or may not need long-term antibiotics will hold off on it but can get midline.        Diet: Minced & Moist Diet (level 5) Mildly Thick (level 2)  Snacks/Supplements Adult: Magic Cup; With Meals    DVT Prophylaxis: DOAC  Martino Catheter: Not present  Central Lines: None  Cardiac Monitoring: None  Code Status: Full Code      Disposition Plan   Expected Discharge: 3 to 5 days  Anticipated discharge location: assisted living  with home care versus long-term care         The patient's care was discussed with the patient and bedside nurse.    Buddy Ledbetter MD  Hospitalist Service  [facility]  Securely message with the Vocera Web Console (learn more here)  Text page via ScoreGrid Paging/Directory         Clinically Significant Risk Factors Present on Admission                # Severe Malnutrition: based on nutrition assessment     ______________________________________________________________________    Interval History   She was confused last evening and overnight but her mental status is better today.    Data reviewed  today: I reviewed all medications, new labs and imaging results over the last 24 hours.    Physical Exam   Vital Signs: Temp: 98.6  F (37  C) Temp src: Axillary BP: (!) 148/74 Pulse: 76   Resp: 24 SpO2: 96 % O2 Device: Nasal cannula with humidification Oxygen Delivery: 1.5 LPM  Weight: 150 lbs 4.8 oz  GENERAL: Mental status is better today compared to yesterday.  Was again able to tell me the name of the hospital and what year it is.  EYES: PERRLA, Normal conjunctiva.  HEART:  Regular rate and rhythm. No JVD. Pulses normal. No edema.  LUNGS: Bilateral crackles.    ABDOMEN:  Soft, no hepatosplenomegaly, normal bowel sounds.  EXTREMETIES: Upper extremity edema.    SKIN:  Dry to touch, No rash.  Neurological examination has mild right-sided weakness..      Data   Recent Labs   Lab 04/13/22  0657 04/13/22  0012 04/10/22  0639 04/09/22  1546 04/09/22  0718 04/08/22  1618 04/08/22  0657 04/07/22  1505 04/07/22  0641   WBC  --   --  5.1  --   --   --  4.0  --   --    HGB  --   --  8.0*  --   --   --  8.1*  --   --    MCV  --   --  99  --   --   --  100  --   --    PLT  --   --  154  --   --   --  152  --   --    NA  --   --  141  --   --   --  141  --  142   POTASSIUM 3.6 3.3* 3.4   < > 3.4   < > 3.3*   < > 3.3*  3.2*   CHLORIDE  --   --  110*  --   --   --  108  --  110*   CO2  --   --  28  --   --   --  27  --  26   BUN  --   --  17  --   --   --  15  --  14   CR  --   --  0.95  --  1.00  --  1.13*  --  1.00   ANIONGAP  --   --  3  --   --   --  6  --  6   ESTIVEN  --   --  8.8  --   --   --  8.6  --  8.8   GLC  --   --  103*  --   --   --  114*  --  120*    < > = values in this interval not displayed.

## 2022-04-13 NOTE — PROGRESS NOTES
F/u pulmonary    Patient remains on stable 1 L O2 however new fever to 100.8 (now 102)  and remains weak and little oral intake.  Has been on ~8 d of zosyn and vancomycin added last night.      Patient remains ill appearing, wrapped in blankets, sitting up. Answers questions slowly but little spontaneous speech.  Lungs still with lower lobe crackles.  H-RR feet and hands are arm, has areas of dependent edema but also infiltrated iv sites but not grossly infected.      CXR today- mild faint opacities but seems equivalent to that seen on CT 5 days go.     WBC remains normal at 5400.  Hgb slightly lower at 7.4  Procalcitonin even lower than on admission.     A: While she has stable respiratory status I am concerned about giving new courses of steroids with new fevers. Steroids are not going to treat the mild peripheral fibrosis.   Daughter asked about picc line and TPN for poor caloric intake but I think an enteral feeding tube (small bore) would be preferable.  She's also concerned about elevated CO2 as a cause of her lethargy which certainly could occur so I will order an ABG as she has reasonable radial pulses.  May need a blood transfusion soon.

## 2022-04-13 NOTE — PHARMACY-VANCOMYCIN DOSING SERVICE
"Pharmacy Vancomycin Initial Note  Date of Service 2022  Patient's  1945  76 year old, female    Indication: Healthcare-Associated Pneumonia    Current estimated CrCl = Estimated Creatinine Clearance: 46.7 mL/min (based on SCr of 0.95 mg/dL).    Creatinine for last 3 days  No results found for requested labs within last 72 hours.    Recent Vancomycin Level(s) for last 3 days  No results found for requested labs within last 72 hours.      Vancomycin IV Administrations (past 72 hours)                   vancomycin 1500 mg in 0.9% NaCl 250 ml intermittent infusion 1,500 mg (mg) 1,500 mg New Bag 22 0436                Nephrotoxins and other renal medications (From now, onward)    Start     Dose/Rate Route Frequency Ordered Stop    22 2200  vancomycin 1250 mg in 0.9% NaCl 250 mL intermittent infusion 1,250 mg         1,250 mg  over 90 Minutes Intravenous EVERY 24 HOURS 22 0921      22 0800  furosemide (LASIX) injection 20 mg         20 mg  over 1-3 Minutes Intravenous EVERY 12 HOURS 22 0749      22 1800  piperacillin-tazobactam (ZOSYN) 3.375 g vial to attach to  mL bag        Note to Pharmacy: For [facility], [facility] and [facility]: For Zosyn-naive patients, use the \"Zosyn initial dose + extended infusion\" order panel.    3.375 g  over 30 Minutes Intravenous EVERY 6 HOURS 22 1244            Contrast Orders - past 72 hours (72h ago, onward)            None          InsightRX Prediction of Planned Initial Vancomycin Regimen  Loading dose: N/A  Regimen: 1250 mg IV every 24 hours.  Start time: 09:20 on 2022  Exposure target: AUC24 (range)400-600 mg/L.hr   AUC24,ss: 503 mg/L.hr  Probability of AUC24 > 400: 75 %  Ctrough,ss: 14.8 mg/L  Probability of Ctrough,ss > 20: 23 %  Probability of nephrotoxicity (Lodise NADIA ): 10 %          Plan:  1. Start vancomycin  1250 mg IV q24h.   2. Vancomycin monitoring method: AUC  3. Vancomycin therapeutic monitoring goal: 400-600 " mg*h/L  4. Pharmacy will check vancomycin levels as appropriate in 3-5 Days.    5. Serum creatinine levels will be ordered daily for the first week of therapy and at least twice weekly for subsequent weeks.      Frederic Lin RPH

## 2022-04-13 NOTE — PROGRESS NOTES
Care Management Follow Up    Length of Stay (days): 9    Expected Discharge Date: 04/14/2022     Concerns to be Addressed: care coordination/care conferences, discharge planning     Patient plan of care discussed at interdisciplinary rounds: Yes    Anticipated Discharge Disposition: SNF/TCU     Anticipated Discharge Services: None  Anticipated Discharge DME: None    Patient/family educated on Medicare website which has current facility and service quality ratings: yes  Education Provided on the Discharge Plan:  yes  Patient/Family in Agreement with the Plan: yes    Referrals Placed by CM/SW: External Care Coordination; post acute facilities  Private pay costs discussed: private room/amenity fees and transportation costs    Additional Information:  Carmen received a call from Saskia at [facility] [phone], who said that they will be able to admit the pt either tomorrow or Friday.  Saskia said that she will need to know if the pt wants a private or shared room.  Carmen told Saskia that they will keep her updated on the pt's discharge date.  Carmen updated the pt's dtr [facility] on the bed availability.    Sw will continue with discharge planning and will be available as needed until discharge.    MACARIO Perez, [facility]  Inpatient Care Coordination  [facility]  [phone]    ADDENDUM 1450:  [facility] declined the pt due to acuity and bed availability.  Carmen updated [facility].  Carmen told [facility] that the pt will be in a shared room, but if she wants a private room, they can request that upon arrival.  Carmen told her that if they want a private room, it will be $42/day out of pocket.

## 2022-04-13 NOTE — PROGRESS NOTES
Pt currently has good working US PIV, Midline ordered per daughter request, however, pt has pending BC and MRSA/MSSA swabs.  Will readdress need for Midline in the AM with bedside RN. Per Dr. Sanabria note, ok line if needed.

## 2022-04-13 NOTE — CONSULTS
ID consult dictated IMP 1 75 yo female ,known to me, complex hx, currently fever and infiltrates, liklely this is recurrent aspiration but larger ddx    REC await BC get procal, same for now but unless BC + or nares MRSA +(ordered) discontinue vanco soon  OK line if needed, Okl steroids if needed

## 2022-04-13 NOTE — PROGRESS NOTES
RT Note:    An ABG was completed on the pt's left radial artery @ 16:41  on an FIO2 of 2L NC with no complications noted during the procedure.

## 2022-04-14 ENCOUNTER — APPOINTMENT (OUTPATIENT)
Dept: SPEECH THERAPY | Facility: CLINIC | Age: 77
DRG: 871 | End: 2022-04-14
Payer: MEDICARE

## 2022-04-14 LAB
ANION GAP SERPL CALCULATED.3IONS-SCNC: 4 MMOL/L (ref 3–14)
BASOPHILS # BLD AUTO: 0 10E3/UL (ref 0–0.2)
BASOPHILS NFR BLD AUTO: 0 %
BUN SERPL-MCNC: 10 MG/DL (ref 7–30)
CALCIUM SERPL-MCNC: 8.9 MG/DL (ref 8.5–10.1)
CHLORIDE BLD-SCNC: 109 MMOL/L (ref 94–109)
CO2 SERPL-SCNC: 28 MMOL/L (ref 20–32)
CREAT SERPL-MCNC: 0.92 MG/DL (ref 0.52–1.04)
EOSINOPHIL # BLD AUTO: 0.3 10E3/UL (ref 0–0.7)
EOSINOPHIL NFR BLD AUTO: 4 %
ERYTHROCYTE [DISTWIDTH] IN BLOOD BY AUTOMATED COUNT: 12.2 % (ref 10–15)
GFR SERPL CREATININE-BSD FRML MDRD: 64 ML/MIN/1.73M2
GLUCOSE BLD-MCNC: 97 MG/DL (ref 70–99)
HCT VFR BLD AUTO: 24 % (ref 35–47)
HGB BLD-MCNC: 7.6 G/DL (ref 11.7–15.7)
IMM GRANULOCYTES # BLD: 0 10E3/UL
IMM GRANULOCYTES NFR BLD: 1 %
LYMPHOCYTES # BLD AUTO: 0.3 10E3/UL (ref 0.8–5.3)
LYMPHOCYTES NFR BLD AUTO: 5 %
MAGNESIUM SERPL-MCNC: 1.9 MG/DL (ref 1.6–2.3)
MCH RBC QN AUTO: 31 PG (ref 26.5–33)
MCHC RBC AUTO-ENTMCNC: 31.7 G/DL (ref 31.5–36.5)
MCV RBC AUTO: 98 FL (ref 78–100)
MONOCYTES # BLD AUTO: 0.5 10E3/UL (ref 0–1.3)
MONOCYTES NFR BLD AUTO: 8 %
NEUTROPHILS # BLD AUTO: 4.7 10E3/UL (ref 1.6–8.3)
NEUTROPHILS NFR BLD AUTO: 82 %
NRBC # BLD AUTO: 0 10E3/UL
NRBC BLD AUTO-RTO: 0 /100
PLATELET # BLD AUTO: 184 10E3/UL (ref 150–450)
POTASSIUM BLD-SCNC: 3 MMOL/L (ref 3.4–5.3)
POTASSIUM BLD-SCNC: 3.6 MMOL/L (ref 3.4–5.3)
RBC # BLD AUTO: 2.45 10E6/UL (ref 3.8–5.2)
SARS-COV-2 RNA RESP QL NAA+PROBE: NEGATIVE
SODIUM SERPL-SCNC: 141 MMOL/L (ref 133–144)
VANCOMYCIN SERPL-MCNC: 13.8 MG/L
WBC # BLD AUTO: 5.8 10E3/UL (ref 4–11)

## 2022-04-14 PROCEDURE — 120N000001 HC R&B MED SURG/OB

## 2022-04-14 PROCEDURE — 99232 SBSQ HOSP IP/OBS MODERATE 35: CPT | Performed by: NURSE PRACTITIONER

## 2022-04-14 PROCEDURE — U0005 INFEC AGEN DETEC AMPLI PROBE: HCPCS | Performed by: STUDENT IN AN ORGANIZED HEALTH CARE EDUCATION/TRAINING PROGRAM

## 2022-04-14 PROCEDURE — 83735 ASSAY OF MAGNESIUM: CPT | Performed by: NURSE PRACTITIONER

## 2022-04-14 PROCEDURE — 92526 ORAL FUNCTION THERAPY: CPT | Mod: GN

## 2022-04-14 PROCEDURE — 250N000011 HC RX IP 250 OP 636: Performed by: INTERNAL MEDICINE

## 2022-04-14 PROCEDURE — 80048 BASIC METABOLIC PNL TOTAL CA: CPT | Performed by: STUDENT IN AN ORGANIZED HEALTH CARE EDUCATION/TRAINING PROGRAM

## 2022-04-14 PROCEDURE — 250N000013 HC RX MED GY IP 250 OP 250 PS 637: Performed by: HOSPITALIST

## 2022-04-14 PROCEDURE — 84132 ASSAY OF SERUM POTASSIUM: CPT | Performed by: STUDENT IN AN ORGANIZED HEALTH CARE EDUCATION/TRAINING PROGRAM

## 2022-04-14 PROCEDURE — 250N000011 HC RX IP 250 OP 636: Performed by: STUDENT IN AN ORGANIZED HEALTH CARE EDUCATION/TRAINING PROGRAM

## 2022-04-14 PROCEDURE — 250N000013 HC RX MED GY IP 250 OP 250 PS 637: Performed by: INTERNAL MEDICINE

## 2022-04-14 PROCEDURE — 36415 COLL VENOUS BLD VENIPUNCTURE: CPT | Performed by: STUDENT IN AN ORGANIZED HEALTH CARE EDUCATION/TRAINING PROGRAM

## 2022-04-14 PROCEDURE — 80202 ASSAY OF VANCOMYCIN: CPT | Performed by: STUDENT IN AN ORGANIZED HEALTH CARE EDUCATION/TRAINING PROGRAM

## 2022-04-14 PROCEDURE — 85025 COMPLETE CBC W/AUTO DIFF WBC: CPT | Performed by: STUDENT IN AN ORGANIZED HEALTH CARE EDUCATION/TRAINING PROGRAM

## 2022-04-14 PROCEDURE — 258N000003 HC RX IP 258 OP 636: Performed by: INTERNAL MEDICINE

## 2022-04-14 PROCEDURE — 250N000013 HC RX MED GY IP 250 OP 250 PS 637: Performed by: STUDENT IN AN ORGANIZED HEALTH CARE EDUCATION/TRAINING PROGRAM

## 2022-04-14 PROCEDURE — 99233 SBSQ HOSP IP/OBS HIGH 50: CPT | Performed by: STUDENT IN AN ORGANIZED HEALTH CARE EDUCATION/TRAINING PROGRAM

## 2022-04-14 RX ORDER — FUROSEMIDE 10 MG/ML
20 INJECTION INTRAMUSCULAR; INTRAVENOUS DAILY
Status: DISCONTINUED | OUTPATIENT
Start: 2022-04-14 | End: 2022-04-18 | Stop reason: HOSPADM

## 2022-04-14 RX ORDER — POTASSIUM CHLORIDE 1.5 G/1.58G
40 POWDER, FOR SOLUTION ORAL ONCE
Status: COMPLETED | OUTPATIENT
Start: 2022-04-14 | End: 2022-04-14

## 2022-04-14 RX ADMIN — RIVAROXABAN 15 MG: 15 TABLET, FILM COATED ORAL at 16:35

## 2022-04-14 RX ADMIN — THERA TABS 1 TABLET: TAB at 09:15

## 2022-04-14 RX ADMIN — LEVETIRACETAM 1000 MG: 500 TABLET, FILM COATED ORAL at 21:42

## 2022-04-14 RX ADMIN — LEVETIRACETAM 250 MG: 250 TABLET, FILM COATED ORAL at 21:42

## 2022-04-14 RX ADMIN — PANTOPRAZOLE SODIUM 40 MG: 40 TABLET, DELAYED RELEASE ORAL at 16:35

## 2022-04-14 RX ADMIN — PIPERACILLIN AND TAZOBACTAM 3.38 G: 3; .375 INJECTION, POWDER, LYOPHILIZED, FOR SOLUTION INTRAVENOUS at 09:07

## 2022-04-14 RX ADMIN — PIPERACILLIN AND TAZOBACTAM 3.38 G: 3; .375 INJECTION, POWDER, LYOPHILIZED, FOR SOLUTION INTRAVENOUS at 21:41

## 2022-04-14 RX ADMIN — METHYLPHENIDATE HYDROCHLORIDE 2.5 MG: 5 TABLET ORAL at 06:56

## 2022-04-14 RX ADMIN — BUSPIRONE HYDROCHLORIDE 30 MG: 15 TABLET ORAL at 21:42

## 2022-04-14 RX ADMIN — AMLODIPINE BESYLATE 5 MG: 5 TABLET ORAL at 09:16

## 2022-04-14 RX ADMIN — MIRABEGRON 25 MG: 25 TABLET, FILM COATED, EXTENDED RELEASE ORAL at 09:15

## 2022-04-14 RX ADMIN — BUSPIRONE HYDROCHLORIDE 30 MG: 15 TABLET ORAL at 09:14

## 2022-04-14 RX ADMIN — PIPERACILLIN AND TAZOBACTAM 3.38 G: 3; .375 INJECTION, POWDER, LYOPHILIZED, FOR SOLUTION INTRAVENOUS at 04:11

## 2022-04-14 RX ADMIN — MIRTAZAPINE 15 MG: 15 TABLET, FILM COATED ORAL at 21:42

## 2022-04-14 RX ADMIN — FUROSEMIDE 20 MG: 10 INJECTION, SOLUTION INTRAMUSCULAR; INTRAVENOUS at 10:26

## 2022-04-14 RX ADMIN — PIPERACILLIN AND TAZOBACTAM 3.38 G: 3; .375 INJECTION, POWDER, LYOPHILIZED, FOR SOLUTION INTRAVENOUS at 14:33

## 2022-04-14 RX ADMIN — POTASSIUM CHLORIDE 40 MEQ: 1.5 POWDER, FOR SOLUTION ORAL at 12:10

## 2022-04-14 RX ADMIN — VANCOMYCIN HYDROCHLORIDE 1250 MG: 10 INJECTION, POWDER, LYOPHILIZED, FOR SOLUTION INTRAVENOUS at 23:07

## 2022-04-14 RX ADMIN — PANTOPRAZOLE SODIUM 40 MG: 40 TABLET, DELAYED RELEASE ORAL at 06:56

## 2022-04-14 RX ADMIN — LEVETIRACETAM 1000 MG: 500 TABLET, FILM COATED ORAL at 09:15

## 2022-04-14 RX ADMIN — FLUOXETINE 40 MG: 20 CAPSULE ORAL at 09:16

## 2022-04-14 RX ADMIN — LEVETIRACETAM 250 MG: 250 TABLET, FILM COATED ORAL at 09:14

## 2022-04-14 ASSESSMENT — ACTIVITIES OF DAILY LIVING (ADL)
ADLS_ACUITY_SCORE: 32
ADLS_ACUITY_SCORE: 28
ADLS_ACUITY_SCORE: 28
ADLS_ACUITY_SCORE: 32
ADLS_ACUITY_SCORE: 28
ADLS_ACUITY_SCORE: 34
ADLS_ACUITY_SCORE: 28
ADLS_ACUITY_SCORE: 32
ADLS_ACUITY_SCORE: 28
ADLS_ACUITY_SCORE: 34
ADLS_ACUITY_SCORE: 28
ADLS_ACUITY_SCORE: 34
ADLS_ACUITY_SCORE: 28
ADLS_ACUITY_SCORE: 34
ADLS_ACUITY_SCORE: 34

## 2022-04-14 NOTE — PHARMACY-VANCOMYCIN DOSING SERVICE
"Pharmacy Vancomycin Note  Date of Service 2022  Patient's  1945   76 year old, female    Indication: Hospital-Acquired Pneumonia  Day of Therapy: 2  Current vancomycin regimen:  1250 mg IV q24h  Current vancomycin monitoring method: AUC  Current vancomycin therapeutic monitoring goal: 400-600 mg*h/L    InsightRX Prediction of Current Vancomycin Regimen    Regimen: 1250 mg IV every 24 hours.  Exposure target: AUC24 (range)400-600 mg/L.hr   AUC24,ss: 497 mg/L.hr  Probability of AUC24 > 400: 88 %  Ctrough,ss: 14.5 mg/L  Probability of Ctrough,ss > 20: 12 %  Probability of nephrotoxicity (Lodise NADIA ): 10 %      Current estimated CrCl = Estimated Creatinine Clearance: 47.1 mL/min (based on SCr of 0.92 mg/dL).    Creatinine for last 3 days  2022:  6:57 AM Creatinine 0.91 mg/dL  2022:  6:58 AM Creatinine 0.92 mg/dL    Recent Vancomycin Levels (past 3 days)  2022:  4:31 PM Vancomycin 13.8 mg/L    Vancomycin IV Administrations (past 72 hours)                   vancomycin 1250 mg in 0.9% NaCl 250 mL intermittent infusion 1,250 mg (mg) 1,250 mg New Bag 22 2224    vancomycin 1500 mg in 0.9% NaCl 250 ml intermittent infusion 1,500 mg (mg) 1,500 mg New Bag 22 0436                Nephrotoxins and other renal medications (From now, onward)    Start     Dose/Rate Route Frequency Ordered Stop    22 0900  furosemide (LASIX) injection 20 mg         20 mg  over 1-3 Minutes Intravenous DAILY 22 0754      22 2200  vancomycin 1250 mg in 0.9% NaCl 250 mL intermittent infusion 1,250 mg         1,250 mg  over 90 Minutes Intravenous EVERY 24 HOURS 22 0921      22 1800  piperacillin-tazobactam (ZOSYN) 3.375 g vial to attach to  mL bag        Note to Pharmacy: For [facility], [facility] and [facility]: For Zosyn-naive patients, use the \"Zosyn initial dose + extended infusion\" order panel.    3.375 g  over 30 Minutes Intravenous EVERY 6 HOURS 22 1244               Contrast " Orders - past 72 hours (72h ago, onward)    None          Interpretation of levels and current regimen:  Vancomycin level is reflective of -600    Has serum creatinine changed greater than 50% in last 72 hours: No    Urine output:  unable to determine    Renal Function: Stable      Plan:  1. Continue Current Dose  2. Vancomycin monitoring method: AUC  3. Vancomycin therapeutic monitoring goal: 400-600 mg*h/L  4. Pharmacy will check vancomycin levels as appropriate in 1-3 Days.  5. Serum creatinine levels will be ordered daily for the first week of therapy and at least twice weekly for subsequent weeks.    Mo June, [facility]

## 2022-04-14 NOTE — PROGRESS NOTES
[facility]  Palliative Care Progress Note  Text Page     Assessment & Plan   Recommendations:  1. Goals of Care- Full Code  Hospitalization goals discussed with patient and daughter La Nena.  Reviewed goal for restorative cares and FULL code.  Reviewed multiple comorbid factors including pulmonary etiology and dysphagia as issues that will require ongoing hospitalization.       Decisional Capacity- Intact. Patient has an advance directive dated 01/21/2016.  If patient becomes unable to demonstrate decisional capacity, her three children, Dario, Sudheer and La Nena are joint health care agents.    - goal to discharge to TCU, appreciate SW input.     2. Fatigue/weakness  Progressive fatigue and weakness which is one cause for current admission.  Difficulty with mobility and max assist of 2 with lift/sarasteady.   - ongoing physical and occupational therapies to determine disposition.     3. Dyspnea  Pneumonia with sepsis found on work up, history of asthma.  Pulmonary consult to evaluate for ILD in the setting of previous PCP.  Possible aspiration pna v fluid overload as contributing factors, work up pending.  - recommend continued work up and interventions as proposed by Pulmonology and Hospitalist teams.      4. Dysphagia  Recent history of dysphagia with choking episodes during hospitalization.  Modified diet at baseline.  Underwent video swallow, see SLP notes.  - SLP consult, appreciate recommendations.  -  Level 5 texture and Level 3, moderately thickened fluid today  - Up in chair for meals with small sips/bites to avoid pocketing of food      5. Aphasia  Baseline findings on exam, secondary to previous GBM, surgical resection and Oncologic treatments.  Requires time during assessments to express known needs and wishes.     6. Spiritual Care  Oriented to Spiritual Health as part of Palliative Care team. Consultation placed for  to follow.  Spiritual Background: not  specified.     7. Care Planning  Appreciate Care of Nicole Qiu.  - goal for TCU at discharge once medically stable.       Medical Decision Making and Goals of Care:  Discussed on April 14, 2022 with Lisha DEVRIES CNP:   Spoke to La Nena on the phone. Reviewed lab findings and specialist consult notes from 4/13.  Reviewed her questions and concerns.  Met with Olga at the bedside.  She was up in the chair for breakfast, denied concerns or questions and stated she was fatigued.  Denied dyspnea, pain, n/v.      Lisha DEVRIES CNP  Pain Management and Palliative Care  [facility]  Pgr: [phone]      Time Spent on this Encounter   Total unit/floor time 25 minutes, time consisted of the following, examination of the patient, reviewing the record and completing documentation. >50% of time spent in counseling and coordination of care, Bedside Nurse Eun and Hospitalist Dr. Ledbetter.  Time spend counseling with patient and family consisted of the following topics, goals of care and care planning for discharge.    Review of Systems    CONSTITUTIONAL: NEGATIVE for fever, chills, change in weight  ENT/MOUTH: NEGATIVE for ear, mouth and throat problems  RESP: NEGATIVE for significant cough or SOB  CV: NEGATIVE for chest pain, palpitations or peripheral edema    Physical Exam   Temp:  [98.3  F (36.8  C)-102.4  F (39.1  C)] 98.3  F (36.8  C)  Pulse:  [] 84  Resp:  [20-30] 30  BP: (128-144)/(66-73) 130/66  SpO2:  [93 %-96 %] 94 %  142 lbs 11.2 oz  Exam:  GEN: alert, oriented to place, situation, sitting in bed, appears comfortable.  CV:  RRR, S1, S2; no murmurs or other irregularities noted.  +3 DP/PT pulses bilatererally; no edema BLE.  RESP:  Symmetric chest rise on inhalation noted.  Normal respiratory effort.  ABD:  Rounded, soft, non-tender/non-distended.  +BS    NEURO:  Word finding with pauses during conversation and staring ahead. Oriented x4.  Psych:  Fall affect.   Calm, cooperative, minimally conversant.       Medications     - MEDICATION INSTRUCTIONS -         amLODIPine  5 mg Oral Daily     busPIRone HCl  30 mg Oral BID     FLUoxetine  40 mg Oral Daily     furosemide  20 mg Intravenous Daily     levETIRAcetam  1,000 mg Oral BID     levETIRAcetam  250 mg Oral BID     methylphenidate  2.5 mg Oral QAM AC     mirabegron  25 mg Oral Daily     mirtazapine  15 mg Oral At Bedtime     multivitamin, therapeutic  1 tablet Oral Daily     pantoprazole  40 mg Oral BID AC     piperacillin-tazobactam  3.375 g Intravenous Q6H     rivaroxaban ANTICOAGULANT  15 mg Oral Daily with supper     sodium chloride (PF)  10 mL Intracatheter Q8H     sodium chloride (PF)  3 mL Intracatheter Q8H     vancomycin  1,250 mg Intravenous Q24H       Data   Results for orders placed or performed during the hospital encounter of 04/04/22 (from the past 24 hour(s))   MRSA MSSA PCR, Nasal Swab    Specimen: Nares, Bilateral; Swab   Result Value Ref Range    MRSA Target DNA Negative Negative    SA Target DNA Negative     Narrative    The [employer]  Xpert SA Nasal Complete assay performed in the [employer]  Dx System is a qualitative in vitro diagnostic test designed for rapid detection of Staphylococcus aureus (SA) and methicillin-resistant Staphylococcus aureus (MRSA) from nasal swabs in patients at risk for nasal colonization. The test utilizes automated real-time polymerase chain reaction (PCR) to detect MRSA/SA DNA. The Xpert SA Nasal Complete assay is intended to aid in the prevention and control of MRSA/SA infections in healthcare settings. The assay is not intended to diagnose, guide or monitor treatment for MRSA/SA infections, or provide results of susceptibility to methicillin. A negative result does not preclude MRSA/SA nasal colonization.    Procalcitonin   Result Value Ref Range    Procalcitonin 0.12 (H) <0.05 ng/mL   Blood gas arterial   Result Value Ref Range    pH Arterial 7.51 (H) 7.35 - 7.45    pCO2  Arterial 34 (L) 35 - 45 mm Hg    pO2 Arterial 63 (L) 80 - 105 mm Hg    FIO2 2     Bicarbonate Arterial 27 21 - 28 mmol/L    Base Excess/Deficit (+/-) 4.3 (H) -9.0 - 1.8 mmol/L   CBC with Platelets & Differential    Narrative    The following orders were created for panel order CBC with Platelets & Differential.  Procedure                               Abnormality         Status                     ---------                               -----------         ------                     CBC with platelets and d...[[id number]]  Abnormal            Final result                 Please view results for these tests on the individual orders.   Basic metabolic panel   Result Value Ref Range    Sodium 141 133 - 144 mmol/L    Potassium 3.0 (L) 3.4 - 5.3 mmol/L    Chloride 109 94 - 109 mmol/L    Carbon Dioxide (CO2) 28 20 - 32 mmol/L    Anion Gap 4 3 - 14 mmol/L    Urea Nitrogen 10 7 - 30 mg/dL    Creatinine 0.92 0.52 - 1.04 mg/dL    Calcium 8.9 8.5 - 10.1 mg/dL    Glucose 97 70 - 99 mg/dL    GFR Estimate 64 >60 mL/min/1.73m2   CBC with platelets and differential   Result Value Ref Range    WBC Count 5.8 4.0 - 11.0 10e3/uL    RBC Count 2.45 (L) 3.80 - 5.20 10e6/uL    Hemoglobin 7.6 (L) 11.7 - 15.7 g/dL    Hematocrit 24.0 (L) 35.0 - 47.0 %    MCV 98 78 - 100 fL    MCH 31.0 26.5 - 33.0 pg    MCHC 31.7 31.5 - 36.5 g/dL    RDW 12.2 10.0 - 15.0 %    Platelet Count 184 150 - 450 10e3/uL    % Neutrophils 82 %    % Lymphocytes 5 %    % Monocytes 8 %    % Eosinophils 4 %    % Basophils 0 %    % Immature Granulocytes 1 %    NRBCs per 100 WBC 0 <1 /100    Absolute Neutrophils 4.7 1.6 - 8.3 10e3/uL    Absolute Lymphocytes 0.3 (L) 0.8 - 5.3 10e3/uL    Absolute Monocytes 0.5 0.0 - 1.3 10e3/uL    Absolute Eosinophils 0.3 0.0 - 0.7 10e3/uL    Absolute Basophils 0.0 0.0 - 0.2 10e3/uL    Absolute Immature Granulocytes 0.0 <=0.4 10e3/uL    Absolute NRBCs 0.0 10e3/uL     *Note: Due to a large number of results and/or encounters for the requested time  period, some results have not been displayed. A complete set of results can be found in Results Review.

## 2022-04-14 NOTE — PROGRESS NOTES
[facility]  Infectious Disease Progress Note          Assessment and Plan:   IMPRESSION:     1.  A 76-year-old female, well known to me from previous hospitalization, currently admitted with some respiratory symptoms, infiltrate and fever, found to have probable pneumonia, on antibiotics and seemingly improving, but now with further recurrent fever, possible issues to be some type of new nosocomial process, resistant pathogen or, I think most likely. Recurrent aspiration, now either a new chemical pneumonitis or possibly infection.  2.  Overall, progressive deterioration over the last year, but fairly stable.  This includes some degree of neurologic symptoms currently, including not being able to get up.  Imaging was negative, but seemingly out of proportion to any infection issue.  Very weak.  3.  One year ago, pneumocystis pneumonia as presumptive diagnosis, but data all fit and responded to a course of Bactrim. At that time, was major immunosuppression; currently not on that.  Fungitell negative currently. This is an unlikely diagnosis and imaging does not fit either.  4.  Glioblastoma with prior resection.  No clear cut further worsening; previously on chemo, but off currently.  5.  Some chronic pulmonary issues, was seen by Pulmonary earlier in the hospital stay.     RECOMMENDATIONS:     1.  Probably not needing vancomycin , blood cultures are negative and nares MRSA negative, discontinue now  2.  Currently getting Zosyn, not clear that this has failed, per se. Potentially, new fever as a new issue or recurrent aspiration, either chemical or bacterial.  Await new cultures and adjust.  3.  Daughter wondering about steroids for the lungs.  No objection to this.  We did that last year when she had pneumocystis and her perception was that was what really made it better.  That was, of course, a different situation, not a clear indication for steroids here.  Would defer this to Pulmonary  "Medicine or Medicine itself.  4.  If not clinically improving, reconsider options.  5.  Even though swallowing study not that abnormal, the clinical picture including what the daughter has observed fits with aspiration.  Continued fever, weak, mentation changes           Interval History:   no new complaints weak lethargic no cp, sob, n/v/d, or abd pain. No + micro procal low MRSA neg some hypoxia              Medications:       amLODIPine  5 mg Oral Daily     busPIRone HCl  30 mg Oral BID     FLUoxetine  40 mg Oral Daily     furosemide  20 mg Intravenous Daily     levETIRAcetam  1,000 mg Oral BID     levETIRAcetam  250 mg Oral BID     methylphenidate  2.5 mg Oral QAM AC     mirabegron  25 mg Oral Daily     mirtazapine  15 mg Oral At Bedtime     multivitamin, therapeutic  1 tablet Oral Daily     pantoprazole  40 mg Oral BID AC     piperacillin-tazobactam  3.375 g Intravenous Q6H     rivaroxaban ANTICOAGULANT  15 mg Oral Daily with supper     sodium chloride (PF)  10 mL Intracatheter Q8H     sodium chloride (PF)  3 mL Intracatheter Q8H     vancomycin  1,250 mg Intravenous Q24H                  Physical Exam:   Blood pressure 130/66, pulse 84, temperature 98.3  F (36.8  C), temperature source Oral, resp. rate 30, height 1.6 m (5' 3\"), weight 64.7 kg (142 lb 11.2 oz), SpO2 94 %.  Wt Readings from Last 2 Encounters:   04/14/22 64.7 kg (142 lb 11.2 oz)   02/24/22 67.1 kg (148 lb)     Vital Signs with Ranges  Temp:  [98.3  F (36.8  C)-102.4  F (39.1  C)] 98.3  F (36.8  C)  Pulse:  [] 84  Resp:  [20-30] 30  BP: (128-144)/(66-73) 130/66  SpO2:  [93 %-96 %] 94 %    Constitutional: Awake,sleepy, cooperative, no apparent distress mild confused   Lungs: Clear to auscultation bilaterally, no crackles or wheezing   Cardiovascular: Regular rate and rhythm, normal S1 and S2, and no murmur noted   Abdomen: Normal bowel sounds, soft, non-distended, non-tender   Skin: No rashes, no cyanosis, no edema   Other:           Data: "   All microbiology laboratory data reviewed.  Recent Labs   Lab Test 04/14/22  0658 04/13/22  0657 04/10/22  0639   WBC 5.8 5.4 5.1   HGB 7.6* 7.4* 8.0*   HCT 24.0* 23.8* 25.8*   MCV 98 102* 99    185 154     Recent Labs   Lab Test 04/14/22  0658 04/13/22  0657 04/10/22  0639   CR 0.92 0.91 0.95     Recent Labs   Lab Test 12/03/21  1432   SED 18     Recent Labs   Lab Test 05/26/21  1542 05/26/21  1535 05/26/21  1419 04/28/21  0836 04/28/21  0830 04/26/21  1613 04/26/21  1609 03/29/21  1951 03/29/21  1946   CULT No growth No growth <10,000 colonies/mL  urogenital mary  Susceptibility testing not routinely done   No growth No growth No growth No growth No growth No growth

## 2022-04-14 NOTE — PROGRESS NOTES
ABG from yesterday     Recent Labs   Lab 04/13/22  1641   PH 7.51*   PCO2 34*   PO2 63*   HCO3 27   O2PER 2     Shows mild respiratory alkalosis so she's ventilating ok and this is not the cause of the encephalopathy.   Remains on lo-dose nasal cannula O2.   Less febrile today.

## 2022-04-14 NOTE — PROGRESS NOTES
Care Management Follow Up    Length of Stay (days): 10    Expected Discharge Date: 04/14/2022     Concerns to be Addressed: care coordination/care conferences, discharge planning     Patient plan of care discussed at interdisciplinary rounds: Yes    Anticipated Discharge Disposition: transitional care facility     Anticipated Discharge Services: None  Anticipated Discharge DME: O2    Patient/family educated on Medicare website which has current facility and service quality ratings: no - family knew where they wanted referrals sent to  Education Provided on the Discharge Plan:  yes  Patient/Family in Agreement with the Plan: yes    Referrals Placed by CM/SW: External Care Coordination  Private pay costs discussed: private room/amenity fees and transportation costs    Additional Information:  Carmen spoke with admissions at [facility] who said that they are able to admit the pt tomorrow.  They request an early afternoon admission.  Carmen left a vm for the pt's dtr La Nena, [phone], updating her on the discharge plan for tomorrow to [facility] TCU.  Carmen discussed the transport options and the cost associated with each.  Due to the pt being on O2 and not able to control it herself due to her confusion, stretcher transport will likely be required.  Carmen will await a call back from La Nena, prior to arranging transport.    Carmen will continue with discharge planning and will be available as needed until discharge.      MACARIO Perez, [facility]  Inpatient Care Coordination  [facility]  [phone]

## 2022-04-14 NOTE — PROGRESS NOTES
"Patient is oriented times one.  Did not know she was in the hospital, told me the year was \"1999.\"    K+ 3.0-->replaced with packets mixed with 8 oz honey thickened liquids.  Gave to her in spoonfuls, with reminders to swallow, and follow this with a swallow.  Coughed times one.    Lungs sound diminished with crackles on left, and at the base on the right.  Oxygen per nasal canula 1 1/2 liter sats 94%.    Peripheral edema 2+ arms.  Is on IV lasix.    purewick removed to sit in chair.    Plan:  Continue zosyn.   PIV   Safety, comfort.     Potential discharge tomorrow to facility.    "

## 2022-04-14 NOTE — PROGRESS NOTES
Alert to self only. Disoriented to place, time and situation. LS crackles. Temp 98.7. Up to recliner with assist of 2 with mechanical lift for supper. Pureed diet level 4. Moderately thick liquid (level 3). Fair appetite. Denied pain. Purwick intact and draining. Continue to monitor .

## 2022-04-14 NOTE — PROGRESS NOTES
[facility]    Medicine Progress Note - Hospitalist Service    Date of Admission:  4/4/2022    Assessment & Plan     Olga Bailey is a 76 year old female with history of glioblastoma multiforme of the left parietal region who underwent craniotomy and complete resection in March 2021 as well as radiation and chemotherapy.  Since then she had an episode of PJP pneumonia due to being on steroids and retroperitoneal hematoma requiring blood transfusion complicated by TRALI.  She also has history of seizure disorder on Keppra, depressive disorder, and uncomplicated asthma. She is currently residing in a nursing home and was brought to the [facility] emergency department on 4/4/22 for evaluation for slowly progressive generalized weakness.       Patient stated that she had generalized weakness which had gradually gotten worse.  She had some right-sided weakness.  She also states that her weakness also shifted to the left side.  She had no cough or shortness of breath.  She denied fever, vomiting, diarrhea, and urinary symptoms.  She had significantly decreased oral intake.      EMS was called and she was noted to be hypoxic with oxygen saturation 88%.  She was given neb treatment.  She was brought to emergency room for evaluation.  On arrival to emergency room, her vital signs showed  temperature 99.9, pulse 94, blood pressure 113/74, oxygen saturation 97% on 3 L.  Laboratory work-up showed sodium 139, potassium 3.2, creatinine 1.42, , lactic acid 0.9, WBC 5.8, hemoglobin 9.7.  Procalcitonin 0.26.  Chest x-ray showed mild alveolar infiltrate in the left midlung.  MRI of the brain showed no evidence of acute ischemia or hemorrhage.  Stable postoperative change in the left side craniotomy and tumor resection.  MRA of the brain/neck negative for high-grade stenosis.     She was admitted to the hospital for further management including IV fluids and IV antibiotics for  pneumonia.  Neurology was contacted by phone  given unremarkable MRI/MRA was not thought to have had a stroke.  Ceftriaxone and doxycycline were started. After admission she developed high fevers.  She remained lethargic and developed worsening confusion.  Some concern about aspiration developed so she was seen by speech therapy and modified diet was ordered.  Ceftriaxone was changed to Zosyn to cover aspiration.  Video swallow study was performed and modified diet was continued.     Patient was slowly improving but on 4/12 she had increasing oxygen needs to 5 L/min with confusion and developed fever fever of 100.8  F and vancomycin was added.  She again had a fever of 102.4  F on the evening of 4/13.     Problem list:     1. Sepsis and acute hypoxic respiratory failure, secondary to community acquired pneumonia versus aspiration pneumonia on top of pre-existing lung fibrosis.    High resolution chest CT scan (4/8) shows interstitial pneumonia on top of pre-existing interstitial fibrosis.    There was concern for aspiration, antibiotic changed from ceftriaxone to Zosyn on 4/6 plan was to finish the course 4/13 but she had fever again overnight on 4/12 and vancomycin was added and repeat blood cultures were ordered.  Nasal MRSA, repeat blood cultures from 4/12 as well as procalcitonin were negative.  Will defer to ID when to stop vancomycin.  No significant aspiration was seen on swallowing evaluations but still suspect an aspiration might be the underlying etiology.  Continue Zosyn for now.  Finished 5 days of doxycycline on 4/9.    Breathing improved with Lasix 20 mg IV twice daily, decrease to 20 mg once a day starting 4/14.    Pulmonary consult appreciated.  Sputum culture, Fungitell and respiratory viral panel negative.  Discussed with pulmonary, will hold off on steroids due to ongoing fevers.  2. Metabolic encephalopathy.      Secondary to pneumonia/respiratory failure.    Polypharmacy also likely  contributing, the dose of Seroquel was decreased and eventually stopped.  Also decreased the dose of fluoxetine from 60mg to 40 mg.  Continue Wellbutrin.   3. Dysphagia:     Speech therapy consult appreciated, Video swallow study done and showed penetration but no aspiration.    Minced and moist diet with mildly thick liquids ordered as per speech therapy recommendation but patient's daughter is still concerned about her inability to follow this diet and will decrease the consistency to thicker liquids.  4. Hypokalemia: Replace per protocol.  5. History of glioblastoma multiforme in the left parietal region status post complete resection in March 2021: Follows with [facility].  Is on radiation 5/2021.  Started adjuvant therapy with temozolomide which was complicated by hematologic toxicity so changed to metronomic/daily dosing and 7/2021 which was tolerated and showed positive treatment effects.  She has completed 6 months of adjuvant temozolomide as of 12/2021.  Follow-up imaging 2/2022 without concern and plan to remain off treatment and on imaging surveillance moving forward.  6. History of seizure disorder: On Keppra, resumed.  7. Chronic kidney disease:     Baseline creatinine around 1.3-1.45, currently less than 1.     I stopped IV fluid as she is developing some edema  8. Depression, Deconditioning and Poor functional status: Continue prior to admission BuSpar, fluoxetine, Ritalin, olanzapine, and Seroquel.  Seroque stopped she is followed by PMR and palliative care as an outpatient.  She has been noted to have declined in her functional status due to poorly controlled depression and lack of inspiration/motivation with chronic fatigue and neurological deficits.     PT and OT consulted.  PT deferring care given current status.    Palliative consult appreciated.  Patient was changed to DNR/DNI and wishes to enroll in hospice at time of discharge to avoid future hospitalizations.  But later the plan  was changed to continue current treatment and code was changed back to full code as daughter did not feel that making DNR/DNI and hospice were ethical considering patient's altered mental status.  9. History of bilateral DVT and retroperitoneal hemorrhage while on anticoagulation now with IVC filter: Found to have bilateral DVT during hospitalization last year and started on enoxaparin which was complicated by right-sided retroperitoneal hemorrhage requiring transfusion support.  She is now on anticoagulation with Xarelto.  10. Acute on chronic anemia and Pancytopenia:    Hemoglobin  has dropped down to 7.6, likely due to blood draws and poor poor mental function.  Transfuse for less than 7 and will need additional Lasix with blood transfusion.    CT on 4/8 did not show retroperitoneal hematoma.  11. Goals of care.  We had detailed discussion with patient's daughter Ms. Deutsch at bedside.  She wants to continue with full code for now as she thinks that after signing DNR/DNI the standard of care drops.  She wants to pursue full work-up now and requested to talk to ID regarding persistent fevers and to pulmonary regarding the respiratory status.  She also requested a PICC line but given that she may or may not need long-term antibiotics will hold off on it but can get midline.        Diet: Snacks/Supplements Adult: Magic Cup; With Meals  Pureed Diet (level 4) Liquidized/Moderately Thick (level 3)    DVT Prophylaxis: DOAC  Martino Catheter: Not present  Central Lines: None  Cardiac Monitoring: None  Code Status: Full Code      Disposition Plan   Expected Discharge: Patient has a TCU bed available for 4/15 and may be able to discharge if he does not have any fever, oxygen needs remain stable and mental status remains stable.       The patient's care was discussed with the patient and bedside nurse.    Bdudy Ledbetter MD  Hospitalist Service  [facility]  Securely message with the Vocera Web Console  (learn more here)  Text page via [facility] Paging/Directory         Clinically Significant Risk Factors Present on Admission                # Severe Malnutrition: based on nutrition assessment     ______________________________________________________________________    Interval History   She was confused last evening and overnight but her mental status is better today.    Data reviewed today: I reviewed all medications, new labs and imaging results over the last 24 hours.    Physical Exam   Vital Signs: Temp: 98.3  F (36.8  C) Temp src: Oral BP: 130/66 Pulse: 84   Resp: 30 SpO2: 94 % O2 Device: Nasal cannula Oxygen Delivery: 2 LPM  Weight: 142 lbs 11.2 oz  GENERAL: Mental status is about the same as yesterday.  Was again able to tell me the name of the hospital and what year it is.  Sitting comfortably in the chair.  EYES: PERRLA, Normal conjunctiva.  HEART:  Regular rate and rhythm. No JVD. Pulses normal. No edema.  LUNGS: Bilateral crackles.    ABDOMEN:  Soft, no hepatosplenomegaly, normal bowel sounds.  EXTREMETIES: Upper extremity edema has improved.    SKIN:  Dry to touch, No rash.  Neurological examination has mild right-sided weakness.    Data   Recent Labs   Lab 04/14/22  0658 04/13/22  0657 04/13/22  0012 04/10/22  0639   WBC 5.8 5.4  --  5.1   HGB 7.6* 7.4*  --  8.0*   MCV 98 102*  --  99    185  --  154    141  --  141   POTASSIUM 3.0* 3.7  3.6 3.3* 3.4   CHLORIDE 109 111*  --  110*   CO2 28 23  --  28   BUN 10 10  --  17   CR 0.92 0.91  --  0.95   ANIONGAP 4 7  --  3   ESTIVEN 8.9 8.5  --  8.8   GLC 97 97  --  103*

## 2022-04-15 ENCOUNTER — APPOINTMENT (OUTPATIENT)
Dept: SPEECH THERAPY | Facility: CLINIC | Age: 77
DRG: 871 | End: 2022-04-15
Payer: MEDICARE

## 2022-04-15 ENCOUNTER — APPOINTMENT (OUTPATIENT)
Dept: PHYSICAL THERAPY | Facility: CLINIC | Age: 77
DRG: 871 | End: 2022-04-15
Payer: MEDICARE

## 2022-04-15 LAB
CREAT SERPL-MCNC: 0.87 MG/DL (ref 0.52–1.04)
GFR SERPL CREATININE-BSD FRML MDRD: 69 ML/MIN/1.73M2
HOLD SPECIMEN: NORMAL
POTASSIUM BLD-SCNC: 3.2 MMOL/L (ref 3.4–5.3)
POTASSIUM BLD-SCNC: 3.4 MMOL/L (ref 3.4–5.3)
POTASSIUM BLD-SCNC: 3.7 MMOL/L (ref 3.4–5.3)

## 2022-04-15 PROCEDURE — 250N000013 HC RX MED GY IP 250 OP 250 PS 637: Performed by: HOSPITALIST

## 2022-04-15 PROCEDURE — 82565 ASSAY OF CREATININE: CPT | Performed by: INTERNAL MEDICINE

## 2022-04-15 PROCEDURE — 99233 SBSQ HOSP IP/OBS HIGH 50: CPT | Performed by: INTERNAL MEDICINE

## 2022-04-15 PROCEDURE — 250N000013 HC RX MED GY IP 250 OP 250 PS 637: Performed by: INTERNAL MEDICINE

## 2022-04-15 PROCEDURE — 250N000013 HC RX MED GY IP 250 OP 250 PS 637: Performed by: STUDENT IN AN ORGANIZED HEALTH CARE EDUCATION/TRAINING PROGRAM

## 2022-04-15 PROCEDURE — 84132 ASSAY OF SERUM POTASSIUM: CPT | Performed by: INTERNAL MEDICINE

## 2022-04-15 PROCEDURE — 250N000011 HC RX IP 250 OP 636: Performed by: INTERNAL MEDICINE

## 2022-04-15 PROCEDURE — 97530 THERAPEUTIC ACTIVITIES: CPT | Mod: GP

## 2022-04-15 PROCEDURE — 120N000001 HC R&B MED SURG/OB

## 2022-04-15 PROCEDURE — 250N000012 HC RX MED GY IP 250 OP 636 PS 637: Performed by: INTERNAL MEDICINE

## 2022-04-15 PROCEDURE — 36415 COLL VENOUS BLD VENIPUNCTURE: CPT | Performed by: INTERNAL MEDICINE

## 2022-04-15 PROCEDURE — 92526 ORAL FUNCTION THERAPY: CPT | Mod: GN | Performed by: SPEECH-LANGUAGE PATHOLOGIST

## 2022-04-15 PROCEDURE — 250N000011 HC RX IP 250 OP 636: Performed by: STUDENT IN AN ORGANIZED HEALTH CARE EDUCATION/TRAINING PROGRAM

## 2022-04-15 RX ORDER — POTASSIUM CHLORIDE 1500 MG/1
20 TABLET, EXTENDED RELEASE ORAL ONCE
Status: COMPLETED | OUTPATIENT
Start: 2022-04-15 | End: 2022-04-15

## 2022-04-15 RX ORDER — METHYLPHENIDATE HYDROCHLORIDE 5 MG/1
5 TABLET ORAL
Status: DISCONTINUED | OUTPATIENT
Start: 2022-04-16 | End: 2022-04-18 | Stop reason: HOSPADM

## 2022-04-15 RX ADMIN — RIVAROXABAN 15 MG: 15 TABLET, FILM COATED ORAL at 16:06

## 2022-04-15 RX ADMIN — PIPERACILLIN AND TAZOBACTAM 3.38 G: 3; .375 INJECTION, POWDER, LYOPHILIZED, FOR SOLUTION INTRAVENOUS at 09:07

## 2022-04-15 RX ADMIN — THERA TABS 1 TABLET: TAB at 09:19

## 2022-04-15 RX ADMIN — PREDNISONE 30 MG: 20 TABLET ORAL at 16:02

## 2022-04-15 RX ADMIN — PANTOPRAZOLE SODIUM 40 MG: 40 TABLET, DELAYED RELEASE ORAL at 06:46

## 2022-04-15 RX ADMIN — BUSPIRONE HYDROCHLORIDE 30 MG: 15 TABLET ORAL at 21:41

## 2022-04-15 RX ADMIN — BUSPIRONE HYDROCHLORIDE 30 MG: 15 TABLET ORAL at 09:17

## 2022-04-15 RX ADMIN — PANTOPRAZOLE SODIUM 40 MG: 40 TABLET, DELAYED RELEASE ORAL at 16:02

## 2022-04-15 RX ADMIN — FLUOXETINE 40 MG: 20 CAPSULE ORAL at 09:18

## 2022-04-15 RX ADMIN — POTASSIUM CHLORIDE 20 MEQ: 1500 TABLET, EXTENDED RELEASE ORAL at 10:31

## 2022-04-15 RX ADMIN — PIPERACILLIN AND TAZOBACTAM 3.38 G: 3; .375 INJECTION, POWDER, LYOPHILIZED, FOR SOLUTION INTRAVENOUS at 14:15

## 2022-04-15 RX ADMIN — MIRABEGRON 25 MG: 25 TABLET, FILM COATED, EXTENDED RELEASE ORAL at 09:19

## 2022-04-15 RX ADMIN — PIPERACILLIN AND TAZOBACTAM 3.38 G: 3; .375 INJECTION, POWDER, LYOPHILIZED, FOR SOLUTION INTRAVENOUS at 20:46

## 2022-04-15 RX ADMIN — FUROSEMIDE 20 MG: 10 INJECTION, SOLUTION INTRAMUSCULAR; INTRAVENOUS at 09:08

## 2022-04-15 RX ADMIN — METHYLPHENIDATE HYDROCHLORIDE 2.5 MG: 5 TABLET ORAL at 06:46

## 2022-04-15 RX ADMIN — ACETAMINOPHEN 650 MG: 325 TABLET, FILM COATED ORAL at 13:13

## 2022-04-15 RX ADMIN — LEVETIRACETAM 250 MG: 250 TABLET, FILM COATED ORAL at 09:18

## 2022-04-15 RX ADMIN — POTASSIUM CHLORIDE 20 MEQ: 1500 TABLET, EXTENDED RELEASE ORAL at 16:01

## 2022-04-15 RX ADMIN — LEVETIRACETAM 1000 MG: 500 TABLET, FILM COATED ORAL at 09:17

## 2022-04-15 RX ADMIN — LEVETIRACETAM 1000 MG: 500 TABLET, FILM COATED ORAL at 21:41

## 2022-04-15 RX ADMIN — LEVETIRACETAM 250 MG: 250 TABLET, FILM COATED ORAL at 21:41

## 2022-04-15 RX ADMIN — PIPERACILLIN AND TAZOBACTAM 3.38 G: 3; .375 INJECTION, POWDER, LYOPHILIZED, FOR SOLUTION INTRAVENOUS at 02:01

## 2022-04-15 RX ADMIN — AMLODIPINE BESYLATE 5 MG: 5 TABLET ORAL at 09:18

## 2022-04-15 ASSESSMENT — ACTIVITIES OF DAILY LIVING (ADL)
ADLS_ACUITY_SCORE: 28
ADLS_ACUITY_SCORE: 34
ADLS_ACUITY_SCORE: 31
ADLS_ACUITY_SCORE: 31
ADLS_ACUITY_SCORE: 28
ADLS_ACUITY_SCORE: 34
ADLS_ACUITY_SCORE: 31
ADLS_ACUITY_SCORE: 31
ADLS_ACUITY_SCORE: 28
ADLS_ACUITY_SCORE: 31
ADLS_ACUITY_SCORE: 28
ADLS_ACUITY_SCORE: 31
ADLS_ACUITY_SCORE: 28
ADLS_ACUITY_SCORE: 28
ADLS_ACUITY_SCORE: 31
ADLS_ACUITY_SCORE: 28
ADLS_ACUITY_SCORE: 31
ADLS_ACUITY_SCORE: 31
ADLS_ACUITY_SCORE: 28
ADLS_ACUITY_SCORE: 34
ADLS_ACUITY_SCORE: 28

## 2022-04-15 NOTE — PLAN OF CARE
SLP Swallow Tx Update: Coughing observed after 1/2 moderately thick liquid trials this am during swallow Tx.  Fatigue and increased swallow delay/pumping observed.  Rec: 1:1 supervision/increased precautions with IDDSI Diet Level 4 puree textures and moderately thick liquids.  Feed only when alert and swallows verified, discontinue po if increased coughing noted.

## 2022-04-15 NOTE — PROGRESS NOTES
Alert to self only. Slow to responding, but more alert during this shift.  Disoriented to time, place and situation.  LS crackles. Up to recliner with assist of 2 on mechanical lift. Incontinent of B&B. Dressing to Left upper arm changed. Mapilex to coccyx for protection. Poor appetite although with some improvement. Continue to monitor.

## 2022-04-15 NOTE — PLAN OF CARE
Goal Outcome Evaluation:                    Took over care of pt from 1997-8153. Pt VS- max temp was 99.1, on 2L nasal cannula. Uneventful few hours. Pt ate fair for her dinner. Chatted with a visitor and slept. Jessica patent. On IV zoysn and changed to oral prednisone. K+ was 3.4- replaced orally- recheck in for 1930.

## 2022-04-15 NOTE — PROGRESS NOTES
[facility]  Infectious Disease Progress Note          Assessment and Plan:   IMPRESSION:     1.  A 76-year-old female, well known to me from previous hospitalization, currently admitted with some respiratory symptoms, infiltrate and fever, found to have probable pneumonia, on antibiotics and seemingly improving, but now with further recurrent fever, possible issues to be some type of new nosocomial process, resistant pathogen or, I think most likely. Recurrent aspiration, now either a new chemical pneumonitis or possibly infection.  2.  Overall, progressive deterioration over the last year, but fairly stable.  This includes some degree of neurologic symptoms currently, including not being able to get up.  Imaging was negative, but seemingly out of proportion to any infection issue.  Very weak.  3.  One year ago, pneumocystis pneumonia as presumptive diagnosis, but data all fit and responded to a course of Bactrim. At that time, was major immunosuppression; currently not on that.  Fungitell negative currently. This is an unlikely diagnosis and imaging does not fit either.  4.  Glioblastoma with prior resection.  No clear cut further worsening; previously on chemo, but off currently.  5.  Some chronic pulmonary issues, was seen by Pulmonary earlier in the hospital stay.     RECOMMENDATIONS:     1.  Probably not needing vancomycin , blood cultures are negative and nares MRSA negative, discontinue now  2.  Currently getting Zosyn, not clear that this has failed, per se. Potentially, new fever as a new issue or recurrent aspiration, either chemical or bacterial.  Await new cultures and adjust.  3.  Daughter wondering about steroids for the lungs.  No objection to this.  We did that last year when she had pneumocystis and her perception was that was what really made it better.  That was, of course, a different situation, not a clear indication for steroids here.  Would defer this to Pulmonary  "Medicine or Medicine itself.    4.  Even though swallowing study not that abnormal, the clinical picture including what the daughter has observed fits with aspiration.  Continued fever, weak, mentation changes, suspect progression of known underlying problem despite not obvious on MRI  5 Note observed coughing with feeding  Discussed with pt and daughter/son  Not clear where to go with this ? Make NPO and alt feed           Interval History:   no new complaints weak lethargic no cp, sob, n/v/d, or abd pain. No + micro procal low MRSA neg some hypoxia              Medications:       amLODIPine  5 mg Oral Daily     busPIRone HCl  30 mg Oral BID     FLUoxetine  40 mg Oral Daily     furosemide  20 mg Intravenous Daily     levETIRAcetam  1,000 mg Oral BID     levETIRAcetam  250 mg Oral BID     [START ON 4/16/2022] methylphenidate  5 mg Oral QAM AC     mirabegron  25 mg Oral Daily     mirtazapine  15 mg Oral At Bedtime     multivitamin, therapeutic  1 tablet Oral Daily     pantoprazole  40 mg Oral BID AC     piperacillin-tazobactam  3.375 g Intravenous Q6H     predniSONE  30 mg Oral Daily     rivaroxaban ANTICOAGULANT  15 mg Oral Daily with supper     sodium chloride (PF)  10 mL Intracatheter Q8H     sodium chloride (PF)  3 mL Intracatheter Q8H                  Physical Exam:   Blood pressure 138/68, pulse 92, temperature (!) 101.3  F (38.5  C), temperature source Axillary, resp. rate 16, height 1.6 m (5' 3\"), weight 64.7 kg (142 lb 11.2 oz), SpO2 92 %.  Wt Readings from Last 2 Encounters:   04/14/22 64.7 kg (142 lb 11.2 oz)   02/24/22 67.1 kg (148 lb)     Vital Signs with Ranges  Temp:  [98.9  F (37.2  C)-101.3  F (38.5  C)] 101.3  F (38.5  C)  Pulse:  [85-94] 92  Resp:  [16-28] 16  BP: (125-138)/(67-68) 138/68  SpO2:  [92 %-96 %] 92 %    Constitutional: Awake,sleepy, cooperative, no apparent distress mild confused   Lungs: Clear to auscultation bilaterally, no crackles or wheezing   Cardiovascular: Regular rate and " rhythm, normal S1 and S2, and no murmur noted   Abdomen: Normal bowel sounds, soft, non-distended, non-tender   Skin: No rashes, no cyanosis, no edema   Other:           Data:   All microbiology laboratory data reviewed.  Recent Labs   Lab Test 04/14/22  0658 04/13/22  0657 04/10/22  0639   WBC 5.8 5.4 5.1   HGB 7.6* 7.4* 8.0*   HCT 24.0* 23.8* 25.8*   MCV 98 102* 99    185 154     Recent Labs   Lab Test 04/15/22  0649 04/14/22  0658 04/13/22  0657   CR 0.87 0.92 0.91     Recent Labs   Lab Test 12/03/21  1432   SED 18     Recent Labs   Lab Test 05/26/21  1542 05/26/21  1535 05/26/21  1419 04/28/21  0836 04/28/21  0830 04/26/21  1613 04/26/21  1609 03/29/21  1951 03/29/21  1946   CULT No growth No growth <10,000 colonies/mL  urogenital mary  Susceptibility testing not routinely done   No growth No growth No growth No growth No growth No growth

## 2022-04-15 NOTE — PROGRESS NOTES
Will start prednisone 30 mg daily.  Continue for 10 days then taper by 10 mg every 2 days to 0.      D/w patient and daughter and Dr Walsh.

## 2022-04-15 NOTE — PROGRESS NOTES
Care Management Follow Up    Length of Stay (days): 11    Expected Discharge Date: 04/18/2022     Concerns to be Addressed: care coordination/care conferences, discharge planning     Patient plan of care discussed at interdisciplinary rounds: Yes    Anticipated Discharge Disposition: SNF/TCU     Anticipated Discharge Services: None  Anticipated Discharge DME: O2    Patient/family educated on Medicare website which has current facility and service quality ratings: no - pt and family knew where they wanted referrals sent to  Education Provided on the Discharge Plan:  yes  Patient/Family in Agreement with the Plan: yes    Referrals Placed by CM/SW: External Care Coordination  Private pay costs discussed: private room/amenity fees and transportation costs    Additional Information:  Carmen was updated by the physician that the pt will not be ready for discharge tomorrow.  Sw informed him that if the pt does not discharge tomorrow morning, the TCU will not be able to discharge until Monday.  Physician approved the plan for a discharge on Monday if medically stable.    Carmen met with the pt and the pt's dtr La Nena who was at the bedside.  Carmen updated La Nena that the TCU can admit the pt on Monday.  La Nena is concerned about the cost for transport and requested that Carmen contact the pt's insurance and determine if she will have coverage for transport.    Carmen left a vm with Nafisa at [facility] P: [phone], updating her on the pt's current discharge plan.    Carmen will continue with discharge planning and will be available as needed until discharge.      MACARIO Perez, [facility]  Inpatient Care Coordination  [facility]  [phone]

## 2022-04-15 NOTE — PROGRESS NOTES
[facility]    Medicine Progress Note - Hospitalist Service    Date of Admission:  4/4/2022    Assessment & Plan            Olga Bailey is a 76 year old female with history of glioblastoma multiforme of the left parietal region who underwent craniotomy and complete resection in March 2021 as well as radiation and chemotherapy, PJP pneumonia, TRALI, seizure disorder for which she is on Keppra, depressive disorder, and uncomplicated asthma. Prior to admission she was residing in a nursing home. She was brought to the [facility] emergency department on 4/4/22 for evaluation for generalized weakness.       She had generalized weakness which had gradually gotten worse.  She had some right-sided weakness.  It was felt that her weakness also shifted to the left side.  She had no cough or shortness of breath.  She denied fever, vomiting, diarrhea, and urinary symptoms.  She had significantly decreased oral intake.      EMS was called and she was noted to be hypoxic with oxygen saturation 88%.  She was given neb treatment.  She was brought to emergency room for evaluation.  On arrival to emergency room, her vital signs showed  temperature 99.9, pulse 94, blood pressure 113/74, and oxygen saturation 97% on 3 L. Laboratory work-up showed sodium 139, potassium 3.2, creatinine 1.42, , lactic acid 0.9, WBC 5.8, hemoglobin 9.7.  Procalcitonin 0.26.  Chest x-ray showed mild alveolar infiltrate in the left midlung.  MRI of the brain showed no evidence of acute ischemia or hemorrhage. Stable postoperative change in the left side craniotomy and tumor resection were noted.  MRA of the brain/neck was negative for high-grade stenosis.     She was admitted to the hospital for further management of pneumonia, including IV fluids and IV antibiotics.  Neurology was contacted by phone and given unremarkable MRI/MRA she was not thought to have had a stroke. Ceftriaxone and doxycycline were  started.  IV fluids were started. Olga was admitted to the hospital for further cares.  After admission she developed high fevers.  She remained lethargic and developed worsening confusion.  Some concern about aspiration developed so she was seen by speech therapy.  Modified diet was ordered.  Ceftriaxone was changed to Zosyn to cover aspiration.  Video swallow study was performed and modified diet was continued.  CT of chest was done on 4/8/22 and showed Increased extensive linear, groundglass and patchy opacities in both lungs- likely due to atypical pneumonia, which may have been superimposed on underlying mild pulmonary fibrosis. Pulmonary medicine was consulted and ordered Fungitell and respiratory viral panel- all of which was negative. Palliative Care was consulted and patient expressed desire to be DNR/DNI and pursue hospice. This was discussed with the patient's daughter.  After some reflection, Olga's daughter expressed some concern about this decision being made with concerns about waxing and waning mental status. It was decided to change code status back to full code and not pursue hospice.  She had a period of improvement but on 4/12 developed recurrent fevers. She continued to have intermittent fevers.  IV Vancomycin was added. Her daughter noted ongoing aspiration, intermittently. Infectious disease was consulted. With concern about ongoing aspiration a more restrictive diet was ordered. Speech re-evaluated patient and continued the more restrictive diet. Empiric steroids were considered given a previous episode of illness with pneumocystis pneumonia that improved with steroids.  Empiric Prednisone was started by Pulmonary Medicine on 4/15/22.      Problem list:     Sepsis (fever and tachycardia) on presentation  Aspiration pneumonia, suspect recurrent  Acute hypoxic respiratory failure, on supplemental oxygen  -Presented with weakness and poor oral intake  -Emergency department evaluation  showed hypoxia and low-grade fever.  Procalcitonin was slightly elevated but white blood cell count and lactic acid were normal at the time of presentation.  Was admitted and treated with Rocephin and doxycycline.  -Developed higher fever with tachycardia overnight on 4/5/2022  -Ceftriaxone was changed to Zosyn to cover possible aspiration pneumonia (dysphagia noted during this hospitalization, see below)  -Swallow eval showed dysphagia for which modified diet was ordered  -CT of chest was ordered on 4/8/22 and showed Increased extensive linear, groundglass and patchy opacities in both lungs- likely due to atypical pneumonia, which may have been superimposed on underlying mild pulmonary fibrosis.  -Pulmonary medicine was consulted on 4/8/22.  -Fungitell and respiratory viral panel were negative.   -Fevers recurred on 4/12 and Vancomycin was started.  -ID was consulted  -Patient's daughter noted further episodes of aspiration, intermittently so diet was made more restrictive. Speech Therapy revisited patient and continued more restrictive diet.   -Overall, course seems most consistent with advanced chronic illness complicated by weakness and deconditioning, associated dysphagia, and recurrent episodes of aspiration pneumonia  -Empiric trial of prednisone ordered by pulmonary medicine today (30 mg daily for 10 days then reduce by 10 mg every to days to 0)  -I met with patient's daughter and we agreed to try current, more restrictive diet and see if this allows patient to improve.  If not improving we discussed considering placement of nasal cannula to prevent brief trial of tube feeds to see if this allows patient stronger show some degree of recovery.  I will talk with Olga over the weekend to see if this is something that she would want.  I think there is a small chance that this might help but, overall, I am doubtful.  I would not want to push this option of more than is not supportive of it.  Emergent is not  supportive of trial of nasal feeding tube given it would be appropriate reconsider hospice.       Dysphagia  -Speech therapy consult appreciated  -Video swallow study done   -Patient continues to follow.  Unrestricted diet was empirically on 4/14 and continued speech therapy on 4/15.  -Continue Zosyn for aspiration pneumonia    Weakness on presentation  Physical deconditioning and weakness  -There is report of some right-sided weakness and also then some left-sided weakness on presentation.  -MRI of brain was done at the time of presentation showed no stroke  -I suspect that weaknesses due to chronic illness and physical deconditioning likely exacerbated by acute and subacute illness with aspiration pneumonia.  -PT was seeing earlier.  I think with possible discharge to TCU they stopped seeing.  Patient will be here at least through the weekend and probably in to early next week so I will ask PT to revisit.  -Patient inquired as to whether recurrent anemia might be contributing to overall weakness and fatigue.  Hemoglobin has drifted down slowly.  She does not seem symptomatic with lightheadedness or dizziness.  I doubt that transfusion would be helpful in relieving fatigue.  I discussed this with pulmonary medicine who agreed.     Hypokalemia, intermittent  -Replace per protocol     History of glioblastoma multiforme in the left parietal region status post complete resection in March 2021  -Follows with [facility].  Is on radiation 5/2021.  Started adjuvant therapy with temozolomide which was complicated by hematologic toxicity so changed to metronomic/daily dosing and 7/2021 which was tolerated and showed positive treatment effects.  She has completed 6 months of adjuvant temozolomide as of 12/2021. Follow-up imaging 2/2022 without concern and plan to remain off treatment and on imaging surveillance moving forward.     History of seizure disorder  -On Keppra, resumed.     Chronic kidney  disease  -Baseline creatinine is around 1.3-1.45 but it has been lower here in the hospital (closer to 0.9)  -Off IV fluids     Depression  Deconditioning  Poor functional status  Intermittent metabolic encephalopathy  Intermittent infectious encephalopathy  -Prior to admission was on BuSpar, fluoxetine, Ritalin, olanzapine, Zyprexa, and Seroquel.  She was followed by PMR and palliative care as an outpatient.  She had been noted to have declined in her functional status that was thought to possibly be due, in part, to depression and lack of inspiration/motivation.  It was not clear that all of these medications were helping.  Patient has weaned off of Seroquel as scheduled Zyprexa.  Prior to admission dose of fluoxetine has been decreased from 60 mg daily to 40 mg daily.  Patient is still on quite a bit of psychoactive medication: BuSpar 30 mg twice daily, fluoxetine 40 mg by mouth daily, Remeron 15 mg by mouth at bedtime, and Zyprexa 2.5 mg at bedtime as needed.  After discussion with daughter today I increased Ritalin to prior to admission dose of 5 mg 3 times daily.  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  She is now on anticoagulation with Xarelto.     Acute on chronic anemia  Pancytopenia  -Hemoglobin was down to 8.0 but then candelaria to 8.5.  Baseline hemoglobin appears to be about 9.5-10.5.  No reports of bleeding per nursing.    -Resume prior to admission Xarelto (blood cells with low hemoglobin)  -CBC in morning  -If hemoglobin dropping consider CT imaging to evaluate for retroperitoneal hematoma     Goals of care  -I spoke with patient's daughter La Nena today.  She was  tearful at times because I think she fears that her mother is declining without a clear path to improvement.  We discussed trying more restricted diet over the weekend to see if this hopefully avoids recurrent episodes of aspiration.  If patient seems to do better on this regimen with continued antibiotics she may show some improvement and be able to discharge to transitional care in the next several days.  We discussed that if patient is not improving and continues to have fevers and show signs of persistent aspiration that we could consider a trial of nasal feeding with tube feeds.  We discussed that this would not be a good long-term intervention but that it might be worth trial of short-term tube feed support to see if patient is able to get out of this suspected cycle of recurrent aspiration with pneumonia and regain some strength.  I will discuss this plan with Olga over the weekend to ensure that she is okay with the idea of the nasal feeding tube if it comes to that.          Diet: Snacks/Supplements Adult: Magic Cup; With Meals  Pureed Diet (level 4) Liquidized/Moderately Thick (level 3)  Room Service    DVT Prophylaxis: DOAC  Martino Catheter: Not present  Central Lines: None  Cardiac Monitoring: None  Code Status: Full Code      Disposition Plan   Expected Discharge: Unclear  Anticipated discharge location:  TCU       The patient's care was discussed with the Bedside Nurse, Patient and Patient's Family.    Srikanth Waslh MD  Hospitalist Service  [facility]  Securely message with the Vocera Web Console (learn more here)  Text page via [employer] Paging/Directory         Clinically Significant Risk Factors Present on Admission                    ______________________________________________________________________    Interval History   Patient continues to have some fevers intermittently.  She remains weak.  She denied pain.    Data reviewed today: I reviewed all medications, new  labs and imaging results over the last 24 hours.     Physical Exam   Vital Signs: Temp: 99.1  F (37.3  C) Temp src: Axillary BP: 111/62 Pulse: 86   Resp: 23 SpO2: 95 % O2 Device: None (Room air) Oxygen Delivery: 2 LPM  Weight: 142 lbs 11.2 oz  GENERAL:  Comfortable. Cooperative. Tired appearing.   PSYCH: pleasant, oriented, No acute distress.  EYES: PERRLA, Normal conjunctiva.  HEART:  Regular rate and rhythm. No JVD. Pulses normal. No edema.  LUNGS:  Clear to auscultation, normal Respiratory effort.  ABDOMEN:  Soft, no hepatosplenomegaly, normal bowel sounds.  EXTREMETIES: No clubbing, cyanosis or ischemia  SKIN:  Dry to touch, No rash.      Data   Recent Labs   Lab 04/15/22  1417 04/15/22  0649 04/14/22  1631 04/14/22  0658 04/13/22  0657 04/13/22  0012 04/10/22  0639   WBC  --   --   --  5.8 5.4  --  5.1   HGB  --   --   --  7.6* 7.4*  --  8.0*   MCV  --   --   --  98 102*  --  99   PLT  --   --   --  184 185  --  154   NA  --   --   --  141 141  --  141   POTASSIUM 3.4 3.2* 3.6 3.0* 3.7  3.6   < > 3.4   CHLORIDE  --   --   --  109 111*  --  110*   CO2  --   --   --  28 23  --  28   BUN  --   --   --  10 10  --  17   CR  --  0.87  --  0.92 0.91  --  0.95   ANIONGAP  --   --   --  4 7  --  3   ESTIVEN  --   --   --  8.9 8.5  --  8.8   GLC  --   --   --  97 97  --  103*    < > = values in this interval not displayed.     No results found for this or any previous visit (from the past 24 hour(s)).

## 2022-04-15 NOTE — PROGRESS NOTES
"Total care.  Feed.  Did well with breakfast and am pills.  Need to go slow.  Reinforced \"swallow.\"    Discharge canceled since spiked a temp to 101.1 axillary.  Patient denied feeling ill.  I rechecked temp-->99.9 axillary.  I did NOT treat this with antipyretic, since was not feeling ill.  I will recheck it.    K 3.2  Gave replacement, will recheck-->draw scheduled for 1430.    More alert today, but remains oriented times one only.      Plan:  Safety, comfort   Abx.     Total cares.    "

## 2022-04-15 NOTE — PLAN OF CARE
"Goal Outcome Evaluation:    Vitals: /67 (BP Location: Right arm, Cuff Size: Adult Regular)   Pulse 85   Temp 99.6  F (37.6  C) (Axillary)   Resp 16   Ht 1.6 m (5' 3\")   Wt 64.7 kg (142 lb 11.2 oz)   SpO2 94%   BMI 25.28 kg/m      Orientation: oriented to self only. Seizure pads on bed for protection.  Pertinent Labs: Blood cultures pending  Pain: Denies  Respiratory: LS diminished w/ crackles in bases. Infrequent non-productive cough.   Bowel Sounds: +X4Q. ABD soft, non-tender.   GI/: Pure-wick in place.   Skin: ST L upper arm w/ miplex intact. 1-3+ edema BUEs. 1-2+ edema BLEs.   LDA: R PIV SL   Receives IV Vanco and IV Zosyn  Protocols: K  Diet: Puree diet w/ moderately thick liquids. Needs assist to be fed.   Activity: AX2.   Followed By: Pulm/ ID/ Soc wk  Plan: Possible discharge early afternoon today to [facility]"

## 2022-04-16 ENCOUNTER — APPOINTMENT (OUTPATIENT)
Dept: OCCUPATIONAL THERAPY | Facility: CLINIC | Age: 77
DRG: 871 | End: 2022-04-16
Payer: MEDICARE

## 2022-04-16 ENCOUNTER — APPOINTMENT (OUTPATIENT)
Dept: SPEECH THERAPY | Facility: CLINIC | Age: 77
DRG: 871 | End: 2022-04-16
Payer: MEDICARE

## 2022-04-16 ENCOUNTER — APPOINTMENT (OUTPATIENT)
Dept: PHYSICAL THERAPY | Facility: CLINIC | Age: 77
DRG: 871 | End: 2022-04-16
Payer: MEDICARE

## 2022-04-16 ENCOUNTER — APPOINTMENT (OUTPATIENT)
Dept: MRI IMAGING | Facility: CLINIC | Age: 77
DRG: 871 | End: 2022-04-16
Attending: INTERNAL MEDICINE
Payer: MEDICARE

## 2022-04-16 LAB
ANION GAP SERPL CALCULATED.3IONS-SCNC: 3 MMOL/L (ref 3–14)
BUN SERPL-MCNC: 18 MG/DL (ref 7–30)
CALCIUM SERPL-MCNC: 9.4 MG/DL (ref 8.5–10.1)
CHLORIDE BLD-SCNC: 112 MMOL/L (ref 94–109)
CO2 SERPL-SCNC: 29 MMOL/L (ref 20–32)
CREAT SERPL-MCNC: 0.92 MG/DL (ref 0.52–1.04)
ERYTHROCYTE [DISTWIDTH] IN BLOOD BY AUTOMATED COUNT: 12.2 % (ref 10–15)
GFR SERPL CREATININE-BSD FRML MDRD: 64 ML/MIN/1.73M2
GLUCOSE BLD-MCNC: 103 MG/DL (ref 70–99)
HCT VFR BLD AUTO: 24.1 % (ref 35–47)
HGB BLD-MCNC: 7.3 G/DL (ref 11.7–15.7)
MCH RBC QN AUTO: 29.8 PG (ref 26.5–33)
MCHC RBC AUTO-ENTMCNC: 30.3 G/DL (ref 31.5–36.5)
MCV RBC AUTO: 98 FL (ref 78–100)
PLATELET # BLD AUTO: 198 10E3/UL (ref 150–450)
POTASSIUM BLD-SCNC: 3.4 MMOL/L (ref 3.4–5.3)
POTASSIUM BLD-SCNC: 3.9 MMOL/L (ref 3.4–5.3)
RBC # BLD AUTO: 2.45 10E6/UL (ref 3.8–5.2)
SODIUM SERPL-SCNC: 144 MMOL/L (ref 133–144)
WBC # BLD AUTO: 4.7 10E3/UL (ref 4–11)

## 2022-04-16 PROCEDURE — 250N000012 HC RX MED GY IP 250 OP 636 PS 637: Performed by: INTERNAL MEDICINE

## 2022-04-16 PROCEDURE — 84132 ASSAY OF SERUM POTASSIUM: CPT | Performed by: INTERNAL MEDICINE

## 2022-04-16 PROCEDURE — 250N000011 HC RX IP 250 OP 636: Performed by: STUDENT IN AN ORGANIZED HEALTH CARE EDUCATION/TRAINING PROGRAM

## 2022-04-16 PROCEDURE — 36415 COLL VENOUS BLD VENIPUNCTURE: CPT | Performed by: INTERNAL MEDICINE

## 2022-04-16 PROCEDURE — 97110 THERAPEUTIC EXERCISES: CPT | Mod: GO

## 2022-04-16 PROCEDURE — 250N000013 HC RX MED GY IP 250 OP 250 PS 637: Performed by: INTERNAL MEDICINE

## 2022-04-16 PROCEDURE — 250N000013 HC RX MED GY IP 250 OP 250 PS 637: Performed by: STUDENT IN AN ORGANIZED HEALTH CARE EDUCATION/TRAINING PROGRAM

## 2022-04-16 PROCEDURE — 250N000013 HC RX MED GY IP 250 OP 250 PS 637: Performed by: HOSPITALIST

## 2022-04-16 PROCEDURE — 85027 COMPLETE CBC AUTOMATED: CPT | Performed by: INTERNAL MEDICINE

## 2022-04-16 PROCEDURE — 97530 THERAPEUTIC ACTIVITIES: CPT | Mod: GP

## 2022-04-16 PROCEDURE — 99233 SBSQ HOSP IP/OBS HIGH 50: CPT | Performed by: INTERNAL MEDICINE

## 2022-04-16 PROCEDURE — 250N000011 HC RX IP 250 OP 636: Performed by: INTERNAL MEDICINE

## 2022-04-16 PROCEDURE — G1004 CDSM NDSC: HCPCS

## 2022-04-16 PROCEDURE — 80048 BASIC METABOLIC PNL TOTAL CA: CPT | Performed by: INTERNAL MEDICINE

## 2022-04-16 PROCEDURE — 92526 ORAL FUNCTION THERAPY: CPT | Mod: GN

## 2022-04-16 PROCEDURE — 120N000001 HC R&B MED SURG/OB

## 2022-04-16 RX ORDER — POTASSIUM CHLORIDE 1.5 G/1.58G
40 POWDER, FOR SOLUTION ORAL ONCE
Status: COMPLETED | OUTPATIENT
Start: 2022-04-16 | End: 2022-04-16

## 2022-04-16 RX ADMIN — PREDNISONE 30 MG: 20 TABLET ORAL at 08:37

## 2022-04-16 RX ADMIN — LEVETIRACETAM 1000 MG: 500 TABLET, FILM COATED ORAL at 08:36

## 2022-04-16 RX ADMIN — MIRABEGRON 25 MG: 25 TABLET, FILM COATED, EXTENDED RELEASE ORAL at 08:37

## 2022-04-16 RX ADMIN — BUSPIRONE HYDROCHLORIDE 30 MG: 15 TABLET ORAL at 08:36

## 2022-04-16 RX ADMIN — AMLODIPINE BESYLATE 5 MG: 5 TABLET ORAL at 08:37

## 2022-04-16 RX ADMIN — PIPERACILLIN AND TAZOBACTAM 3.38 G: 3; .375 INJECTION, POWDER, LYOPHILIZED, FOR SOLUTION INTRAVENOUS at 21:19

## 2022-04-16 RX ADMIN — FUROSEMIDE 20 MG: 10 INJECTION, SOLUTION INTRAMUSCULAR; INTRAVENOUS at 08:38

## 2022-04-16 RX ADMIN — METHYLPHENIDATE HYDROCHLORIDE 5 MG: 5 TABLET ORAL at 08:37

## 2022-04-16 RX ADMIN — POTASSIUM CHLORIDE 40 MEQ: 1.5 POWDER, FOR SOLUTION ORAL at 19:19

## 2022-04-16 RX ADMIN — PIPERACILLIN AND TAZOBACTAM 3.38 G: 3; .375 INJECTION, POWDER, LYOPHILIZED, FOR SOLUTION INTRAVENOUS at 15:58

## 2022-04-16 RX ADMIN — PANTOPRAZOLE SODIUM 40 MG: 40 TABLET, DELAYED RELEASE ORAL at 15:58

## 2022-04-16 RX ADMIN — THERA TABS 1 TABLET: TAB at 08:37

## 2022-04-16 RX ADMIN — LEVETIRACETAM 250 MG: 250 TABLET, FILM COATED ORAL at 08:38

## 2022-04-16 RX ADMIN — BUSPIRONE HYDROCHLORIDE 30 MG: 15 TABLET ORAL at 21:18

## 2022-04-16 RX ADMIN — LEVETIRACETAM 250 MG: 250 TABLET, FILM COATED ORAL at 21:18

## 2022-04-16 RX ADMIN — PIPERACILLIN AND TAZOBACTAM 3.38 G: 3; .375 INJECTION, POWDER, LYOPHILIZED, FOR SOLUTION INTRAVENOUS at 08:38

## 2022-04-16 RX ADMIN — RIVAROXABAN 15 MG: 15 TABLET, FILM COATED ORAL at 16:06

## 2022-04-16 RX ADMIN — PIPERACILLIN AND TAZOBACTAM 3.38 G: 3; .375 INJECTION, POWDER, LYOPHILIZED, FOR SOLUTION INTRAVENOUS at 03:06

## 2022-04-16 RX ADMIN — FLUOXETINE 40 MG: 20 CAPSULE ORAL at 08:37

## 2022-04-16 RX ADMIN — LEVETIRACETAM 1000 MG: 500 TABLET, FILM COATED ORAL at 21:18

## 2022-04-16 RX ADMIN — PANTOPRAZOLE SODIUM 40 MG: 40 TABLET, DELAYED RELEASE ORAL at 08:36

## 2022-04-16 ASSESSMENT — ACTIVITIES OF DAILY LIVING (ADL)
ADLS_ACUITY_SCORE: 31

## 2022-04-16 NOTE — PLAN OF CARE
Goal Outcome Evaluation:                    Took over care of pt from 1738-2528. Pt is up in chair talking and holding conversation. VS- on 2L nasal cannula. Denies pain. Up with A2 and sera steady. On honey thick puree diet- appetite good. K+ at 0730 was 3.4, replaced orally. On Iv zoysn. Bladder scan at 1300 was 535- straight cath for 800. Vaginal area is redenned and excoriated from pure wick. Had MRI- results pending. Plan to discharge to TCU once afebrile.

## 2022-04-16 NOTE — PLAN OF CARE
Goal Outcome Evaluation:      Pt alert to self only. Assist x 2 with lift. On pureed diet with moderate thick liquid. Total feed and total care. On 2 L O2 NC. Has generalized edema +1 +2. PIV SL (L) arm. On seizure precaution. On Zosyn for PNA. On Xarelto. Was seen by pulmonary yesterday and started on oral prednisone.

## 2022-04-16 NOTE — PROGRESS NOTES
[facility]    Medicine Progress Note - Hospitalist Service    Date of Admission:  4/4/2022    Assessment & Plan            Olga Bailey is a 76 year old female with history of glioblastoma multiforme of the left parietal region who underwent craniotomy and complete resection in March 2021 as well as radiation and chemotherapy, PJP pneumonia, TRALI, seizure disorder for which she is on Keppra, depressive disorder, and uncomplicated asthma. Prior to admission she was residing in a nursing home. She was brought to the [facility] emergency department on 4/4/22 for evaluation for generalized weakness.       She had generalized weakness which had gradually gotten worse.  She had some right-sided weakness.  It was felt that her weakness also shifted to the left side.  She had no cough or shortness of breath.  She denied fever, vomiting, diarrhea, and urinary symptoms.  She had significantly decreased oral intake.      EMS was called and she was noted to be hypoxic with oxygen saturation 88%.  She was given neb treatment.  She was brought to emergency room for evaluation.  On arrival to emergency room, her vital signs showed  temperature 99.9, pulse 94, blood pressure 113/74, and oxygen saturation 97% on 3 L. Laboratory work-up showed sodium 139, potassium 3.2, creatinine 1.42, , lactic acid 0.9, WBC 5.8, hemoglobin 9.7.  Procalcitonin 0.26.  Chest x-ray showed mild alveolar infiltrate in the left midlung.  MRI of the brain showed no evidence of acute ischemia or hemorrhage. Stable postoperative change in the left side craniotomy and tumor resection were noted.  MRA of the brain/neck was negative for high-grade stenosis.     She was admitted to the hospital for further management of pneumonia, including IV fluids and IV antibiotics.  Neurology was contacted by phone and given unremarkable MRI/MRA she was not thought to have had a stroke.  Ceftriaxone and doxycycline were started.  IV fluids were started. Olga was admitted to the hospital for further cares.  After admission she developed high fevers.  She remained lethargic and developed worsening confusion.  Some concern about aspiration developed so she was seen by speech therapy.  Modified diet was ordered.  Ceftriaxone was changed to Zosyn to cover aspiration.  Video swallow study was performed and modified diet was continued.  CT of chest was done on 4/8/22 and showed Increased extensive linear, groundglass and patchy opacities in both lungs- likely due to atypical pneumonia, which may have been superimposed on underlying mild pulmonary fibrosis. Pulmonary medicine was consulted and ordered Fungitell and respiratory viral panel- all of which was negative. Palliative Care was consulted and patient expressed desire to be DNR/DNI and pursue hospice. This was discussed with the patient's daughter.  After some reflection, Olga's daughter expressed some concern about this decision being made with concerns about waxing and waning mental status. It was decided to change code status back to full code and not pursue hospice.  She had a period of improvement but on 4/12 developed recurrent fevers. She continued to have intermittent fevers.  IV Vancomycin was added. Her daughter noted ongoing aspiration, intermittently. Infectious disease was consulted. With concern about ongoing aspiration a more restrictive diet was ordered. Speech re-evaluated patient and continued the more restrictive diet. Empiric steroids were considered given a previous episode of illness with pneumocystis pneumonia that improved with steroids.  Empiric Prednisone was started by Pulmonary Medicine on 4/15/22.      Problem list:     Sepsis (fever and tachycardia) on presentation  Aspiration pneumonia, suspect recurrent  Acute hypoxic respiratory failure, on supplemental oxygen  -Presented with weakness and poor oral  intake  -Emergency department evaluation showed hypoxia and low-grade fever.  Procalcitonin was slightly elevated but white blood cell count and lactic acid were normal at the time of presentation.  Was admitted and treated with Rocephin and doxycycline.  -Developed higher fever with tachycardia overnight on 4/5/2022  -Ceftriaxone was changed to Zosyn to cover possible aspiration pneumonia (dysphagia noted during this hospitalization, see below)  -Swallow eval showed dysphagia for which modified diet was ordered  -CT of chest was ordered on 4/8/22 and showed Increased extensive linear, groundglass and patchy opacities in both lungs- likely due to atypical pneumonia, which may have been superimposed on underlying mild pulmonary fibrosis.  -Pulmonary medicine was consulted on 4/8/22.  -Fungitell and respiratory viral panel were negative.   -Fevers recurred on 4/12 and Vancomycin was started.  -ID was consulted  -Patient's daughter noted further episodes of aspiration, intermittently so diet was made more restrictive. Speech Therapy revisited patient and continued more restrictive diet.   -Overall, course seems most consistent with advanced chronic illness complicated by weakness and deconditioning, associated dysphagia, and recurrent episodes of aspiration pneumonia  -Empiric trial of prednisone ordered by pulmonary medicine 4/15/22 (30 mg daily for 10 days then reduce by 10 mg every to days to 0)  -Seems better 4/16- afebrile for 24 hours,more awake and interactive; seemingly stonger- not sure if related to any changes made in the last day or so (modified diet, prednisone, stopping remeron (see below) but will continue to monitor.  -Tentative plan is if no improvement and continued fevers trial of nasal feeding tube and tube feeds to allow opportunity to get stronger. In confirmed with Olga that she would want a trial of nasal of feeding tube if there was a chance it would help.      Dysphagia  -Speech therapy  consult appreciated  -Video swallow study done   -Patient continues to follow the more restricted diet that was started empirically on 4/14 and continued by speech therapy on 4/15.  -Continue Zosyn for aspiration pneumonia  -Would consider trial of temporary nasal feeding tube if ongoing concerns about aspiration     Weakness on presentation  Physical deconditioning and weakness  Right upper extremity weakness > left; acute on chronic  -There was report of some right-sided weakness and also then some left-sided weakness on presentation.  -Patient had had some right sided weakness for 6 months or more per daughter- possibly residual from c spine surgery and shoulder surgery. Right arm weakness has been more pronounced, however, since just before presentation.   -MRI of brain was done at the time of presentation showed no stroke  -I suspect that weaknesses due to chronic illness and physical deconditioning likely exacerbated by acute and subacute illness with aspiration pneumonia.  -PT saw patient today and had her up in a [facility].   -C spine MRI today (discussed with daughter). If no acute finding could consider Neurology consult.      Hypokalemia, intermittent  -Replace per protocol     History of glioblastoma multiforme in the left parietal region status post complete resection in March 2021  -Follows with [facility].  Is on radiation 5/2021.  Started adjuvant therapy with temozolomide which was complicated by hematologic toxicity so changed to metronomic/daily dosing and 7/2021 which was tolerated and showed positive treatment effects.  She has completed 6 months of adjuvant temozolomide as of 12/2021. Follow-up imaging 2/2022 without concern and plan to remain off treatment and on imaging surveillance moving forward.     History of seizure disorder  -On Keppra     Chronic kidney disease  -Baseline creatinine is around 1.3-1.45 but it has been lower here in the hospital (closer to 0.9)  -Off IV  fluids     Depression  Deconditioning  Poor functional status  Intermittent metabolic encephalopathy  Intermittent infectious encephalopathy  -Prior to admission was on BuSpar, fluoxetine, Ritalin, olanzapine, Zyprexa, and Seroquel.  She was followed by PMR and palliative care as an outpatient.  She had been noted to have declined in her functional status that was thought to possibly be due, in part, to depression and lack of inspiration/motivation.  It was not clear thatt all of these medications were helping.  Patient has weaned off of Seroquel as scheduled Zyprexa.  Prior to admission dose of fluoxetine has been decreased from 60 mg daily to 40 mg daily. Remeron was stopped on 4/15. Continue BuSpar 30 mg twice daily, fluoxetine 40 mg by mouth daily, Zyprexa 2.5 mg at bedtime as needed, and Ritalin 5 mg TID.  -With improvement from 4/15 to 4/16 after starting prednisone I will check cortisol to screen for adrenal insufficiency.       History of bilateral DVT and retroperitoneal hemorrhage while on anticoagulation now with IVC filter   -Found to have bilateral DVT during hospitalization last year and started on enoxaparin which was complicated by right-sided retroperitoneal hemorrhage requiring transfusion support.  She is now on anticoagulation with Xarelto.     Acute on chronic anemia  Pancytopenia  -Hemoglobin was down to 8.0 but then candelaria to 8.5 and is now down to mid 7s..  Baseline hemoglobin appears to be about 9.5-10.5.  No reports of bleeding per nursing.    -Continue prior to admission Xarelto   -If significant acute hemoglobin drop consider CT imaging to evaluate for retroperitoneal hematoma    Malnutrition  -Level of malnutrition: unspecified per nutrition       Goals of care  -I am encouraged by improvement in the last day. I updated patient's daughter La Nena. Plan is to continue more restrictive diet, zosyn, trial of prednisone, and PT. Monitor off of seroquel, zyprexa, and remeron but not planning to  reduce psychiatric meds more. If improvement continues consider discharge to TCU with Speech and nutrition to follow closely and PT and OT to assess and treat. If not improving due to aspiration consideration consider keotube placement for trial of temporary tube feeds in hope of improving strength.        Diet: Snacks/Supplements Adult: Magic Cup; With Meals  Pureed Diet (level 4) Liquidized/Moderately Thick (level 3)  Room Service    DVT Prophylaxis: DOAC  Martino Catheter: Not present  Central Lines: None  Cardiac Monitoring: None  Code Status: Full Code      Disposition Plan   Expected Discharge: 04/18/2022   Or 4/19/2022  Anticipated discharge location: TCU     The patient's care was discussed with the Bedside Nurse, Patient and Patient's Family.    Srikanth Walsh MD  Hospitalist Service  [facility]  Securely message with the Vocera Web Console (learn more here)  Text page via [employer] Paging/Directory           ______________________________________________________________________    Interval History   Patient is much more alert and awake today.  Seems stronger.  Denies chest pain, shortness of breath, abdominal pain, nausea, vomiting, diarrhea, and dysuria.  Was up with Yvette ayala today.    Data reviewed today: I reviewed all medications, new labs and imaging results over the last 24 hours    Physical Exam   Vital Signs: Temp: 97.7  F (36.5  C) Temp src: Axillary BP: 123/71 Pulse: 80   Resp: 18 SpO2: 98 % O2 Device: Nasal cannula with humidification Oxygen Delivery: 2 LPM  Weight: 146 lbs 12.8 oz  GENERAL:  Comfortable. Cooperative.  PSYCH: pleasant, oriented, No acute distress.  EYES: PERRLA, Normal conjunctiva.  HEART:  Regular rate and rhythm. No JVD. Pulses normal. No edema.  LUNGS:  Clear to auscultation, normal Respiratory effort.  ABDOMEN:  Soft, no hepatosplenomegaly, normal bowel sounds.  EXTREMETIES: No clubbing, cyanosis or ischemia  SKIN:  Dry to touch, No rash.      Data    Recent Labs   Lab 04/16/22  0734 04/15/22  1913 04/15/22  1417 04/15/22  0649 04/14/22  1631 04/14/22  0658 04/13/22  0657   WBC 4.7  --   --   --   --  5.8 5.4   HGB 7.3*  --   --   --   --  7.6* 7.4*   MCV 98  --   --   --   --  98 102*     --   --   --   --  184 185     --   --   --   --  141 141   POTASSIUM 3.4 3.7 3.4 3.2*   < > 3.0* 3.7  3.6   CHLORIDE 112*  --   --   --   --  109 111*   CO2 29  --   --   --   --  28 23   BUN 18  --   --   --   --  10 10   CR 0.92  --   --  0.87  --  0.92 0.91   ANIONGAP 3  --   --   --   --  4 7   ESTIVEN 9.4  --   --   --   --  8.9 8.5   *  --   --   --   --  97 97    < > = values in this interval not displayed.

## 2022-04-16 NOTE — PLAN OF CARE
"/64 (BP Location: Right arm)   Pulse 72   Temp 97.4  F (36.3  C) (Axillary)   Resp 18   Ht 1.6 m (5' 3\")   Wt 66.6 kg (146 lb 12.8 oz)   SpO2 96%   BMI 26.00 kg/m      Pt alert to self only. Assist x 2 with lift. On pureed diet with moderate thick liquid. Total feed and total care. On 2 L O2 NC. Has generalized edema +1 +2. PIV SL (L) arm. On seizure precaution. On Zosyn for PNA. On Xarelto. Will continue to monitor.   "

## 2022-04-17 LAB
ANION GAP SERPL CALCULATED.3IONS-SCNC: 4 MMOL/L (ref 3–14)
BUN SERPL-MCNC: 21 MG/DL (ref 7–30)
CALCIUM SERPL-MCNC: 9.7 MG/DL (ref 8.5–10.1)
CHLORIDE BLD-SCNC: 112 MMOL/L (ref 94–109)
CO2 SERPL-SCNC: 29 MMOL/L (ref 20–32)
CORTIS SERPL-MCNC: 6.5 UG/DL (ref 4–22)
CREAT SERPL-MCNC: 0.91 MG/DL (ref 0.52–1.04)
ERYTHROCYTE [DISTWIDTH] IN BLOOD BY AUTOMATED COUNT: 12.5 % (ref 10–15)
GFR SERPL CREATININE-BSD FRML MDRD: 65 ML/MIN/1.73M2
GLUCOSE BLD-MCNC: 93 MG/DL (ref 70–99)
HCT VFR BLD AUTO: 23.7 % (ref 35–47)
HGB BLD-MCNC: 7.3 G/DL (ref 11.7–15.7)
MCH RBC QN AUTO: 30.5 PG (ref 26.5–33)
MCHC RBC AUTO-ENTMCNC: 30.8 G/DL (ref 31.5–36.5)
MCV RBC AUTO: 99 FL (ref 78–100)
PLATELET # BLD AUTO: 206 10E3/UL (ref 150–450)
POTASSIUM BLD-SCNC: 3.4 MMOL/L (ref 3.4–5.3)
POTASSIUM BLD-SCNC: 4.1 MMOL/L (ref 3.4–5.3)
RBC # BLD AUTO: 2.39 10E6/UL (ref 3.8–5.2)
SODIUM SERPL-SCNC: 145 MMOL/L (ref 133–144)
WBC # BLD AUTO: 5.1 10E3/UL (ref 4–11)

## 2022-04-17 PROCEDURE — 250N000013 HC RX MED GY IP 250 OP 250 PS 637: Performed by: INTERNAL MEDICINE

## 2022-04-17 PROCEDURE — 250N000009 HC RX 250: Performed by: HOSPITALIST

## 2022-04-17 PROCEDURE — 250N000012 HC RX MED GY IP 250 OP 636 PS 637: Performed by: INTERNAL MEDICINE

## 2022-04-17 PROCEDURE — 36415 COLL VENOUS BLD VENIPUNCTURE: CPT | Performed by: INTERNAL MEDICINE

## 2022-04-17 PROCEDURE — 85027 COMPLETE CBC AUTOMATED: CPT | Performed by: INTERNAL MEDICINE

## 2022-04-17 PROCEDURE — 99233 SBSQ HOSP IP/OBS HIGH 50: CPT | Performed by: INTERNAL MEDICINE

## 2022-04-17 PROCEDURE — 80048 BASIC METABOLIC PNL TOTAL CA: CPT | Performed by: INTERNAL MEDICINE

## 2022-04-17 PROCEDURE — 82533 TOTAL CORTISOL: CPT | Performed by: INTERNAL MEDICINE

## 2022-04-17 PROCEDURE — 120N000001 HC R&B MED SURG/OB

## 2022-04-17 PROCEDURE — 250N000011 HC RX IP 250 OP 636: Performed by: STUDENT IN AN ORGANIZED HEALTH CARE EDUCATION/TRAINING PROGRAM

## 2022-04-17 PROCEDURE — 250N000011 HC RX IP 250 OP 636: Performed by: INTERNAL MEDICINE

## 2022-04-17 PROCEDURE — 250N000013 HC RX MED GY IP 250 OP 250 PS 637: Performed by: HOSPITALIST

## 2022-04-17 PROCEDURE — 84132 ASSAY OF SERUM POTASSIUM: CPT | Performed by: INTERNAL MEDICINE

## 2022-04-17 PROCEDURE — 250N000013 HC RX MED GY IP 250 OP 250 PS 637: Performed by: STUDENT IN AN ORGANIZED HEALTH CARE EDUCATION/TRAINING PROGRAM

## 2022-04-17 RX ORDER — POTASSIUM CHLORIDE 1500 MG/1
40 TABLET, EXTENDED RELEASE ORAL ONCE
Status: COMPLETED | OUTPATIENT
Start: 2022-04-17 | End: 2022-04-17

## 2022-04-17 RX ADMIN — SODIUM BICARBONATE 40 MG: 84 INJECTION, SOLUTION INTRAVENOUS at 20:08

## 2022-04-17 RX ADMIN — PREDNISONE 30 MG: 20 TABLET ORAL at 09:22

## 2022-04-17 RX ADMIN — RIVAROXABAN 20 MG: 20 TABLET, FILM COATED ORAL at 16:27

## 2022-04-17 RX ADMIN — PIPERACILLIN AND TAZOBACTAM 3.38 G: 3; .375 INJECTION, POWDER, LYOPHILIZED, FOR SOLUTION INTRAVENOUS at 16:27

## 2022-04-17 RX ADMIN — BUSPIRONE HYDROCHLORIDE 30 MG: 15 TABLET ORAL at 09:21

## 2022-04-17 RX ADMIN — PANTOPRAZOLE SODIUM 40 MG: 40 TABLET, DELAYED RELEASE ORAL at 06:36

## 2022-04-17 RX ADMIN — PIPERACILLIN AND TAZOBACTAM 3.38 G: 3; .375 INJECTION, POWDER, LYOPHILIZED, FOR SOLUTION INTRAVENOUS at 03:18

## 2022-04-17 RX ADMIN — PIPERACILLIN AND TAZOBACTAM 3.38 G: 3; .375 INJECTION, POWDER, LYOPHILIZED, FOR SOLUTION INTRAVENOUS at 09:12

## 2022-04-17 RX ADMIN — LEVETIRACETAM 1000 MG: 500 TABLET, FILM COATED ORAL at 20:16

## 2022-04-17 RX ADMIN — FUROSEMIDE 20 MG: 10 INJECTION, SOLUTION INTRAMUSCULAR; INTRAVENOUS at 09:12

## 2022-04-17 RX ADMIN — LEVETIRACETAM 1000 MG: 500 TABLET, FILM COATED ORAL at 09:21

## 2022-04-17 RX ADMIN — PIPERACILLIN AND TAZOBACTAM 3.38 G: 3; .375 INJECTION, POWDER, LYOPHILIZED, FOR SOLUTION INTRAVENOUS at 20:24

## 2022-04-17 RX ADMIN — BUSPIRONE HYDROCHLORIDE 30 MG: 15 TABLET ORAL at 20:16

## 2022-04-17 RX ADMIN — FLUOXETINE 40 MG: 20 CAPSULE ORAL at 09:20

## 2022-04-17 RX ADMIN — LEVETIRACETAM 250 MG: 250 TABLET, FILM COATED ORAL at 09:23

## 2022-04-17 RX ADMIN — LEVETIRACETAM 250 MG: 250 TABLET, FILM COATED ORAL at 20:17

## 2022-04-17 RX ADMIN — POTASSIUM CHLORIDE 40 MEQ: 1500 TABLET, EXTENDED RELEASE ORAL at 09:21

## 2022-04-17 RX ADMIN — METHYLPHENIDATE HYDROCHLORIDE 5 MG: 5 TABLET ORAL at 06:36

## 2022-04-17 RX ADMIN — MIRABEGRON 25 MG: 25 TABLET, FILM COATED, EXTENDED RELEASE ORAL at 09:24

## 2022-04-17 RX ADMIN — THERA TABS 1 TABLET: TAB at 09:21

## 2022-04-17 RX ADMIN — AMLODIPINE BESYLATE 5 MG: 5 TABLET ORAL at 09:24

## 2022-04-17 ASSESSMENT — ACTIVITIES OF DAILY LIVING (ADL)
ADLS_ACUITY_SCORE: 35
ADLS_ACUITY_SCORE: 35
ADLS_ACUITY_SCORE: 37
ADLS_ACUITY_SCORE: 33
ADLS_ACUITY_SCORE: 35
ADLS_ACUITY_SCORE: 37
ADLS_ACUITY_SCORE: 35
ADLS_ACUITY_SCORE: 37
ADLS_ACUITY_SCORE: 37
ADLS_ACUITY_SCORE: 33
ADLS_ACUITY_SCORE: 37
ADLS_ACUITY_SCORE: 33
ADLS_ACUITY_SCORE: 35
ADLS_ACUITY_SCORE: 33
ADLS_ACUITY_SCORE: 33
ADLS_ACUITY_SCORE: 35
ADLS_ACUITY_SCORE: 33

## 2022-04-17 NOTE — PLAN OF CARE
"/76 (BP Location: Right arm)   Pulse 78   Temp 98.6  F (37  C) (Oral)   Resp 16   Ht 1.6 m (5' 3\")   Wt 65.8 kg (145 lb 1.6 oz)   SpO2 96%   BMI 25.70 kg/m      VSS, Pt alert to self only. Assist x 2 with lift. On pureed diet with moderate thick liquid. Total feed and total care. On 2LNC. Has generalized edema +1 +2. PIV SL (L) arm. Seizure precautions. On Zosyn for PNA. On Xarelto. Will continue to monitor  "

## 2022-04-17 NOTE — PLAN OF CARE
Goal Outcome Evaluation:    VSS on 2 L NC. Alert, only oriented to self. Assist of 2 w/ lift. Denies pain. RPIV SL. IV zosyn for pneumonia. Takes pills crushed in applesauce. Tolerated applesauce and pudding overnight, some coughing present w/ thickened water. On K protocol, 3.9 after replacement, AM draw. Incontinent, no urine output overnight. Bladder scan 136 and 308, no straight at this time. Possible discharge in 1-2 days to TCU. Will continue with plan of care.

## 2022-04-17 NOTE — PROGRESS NOTES
[facility]    Medicine Progress Note - Hospitalist Service    Date of Admission:  4/4/2022    Assessment & Plan          Olga Bailey is a 76 year old female with history of glioblastoma multiforme of the left parietal region who underwent craniotomy and complete resection in March 2021 as well as radiation and chemotherapy, PJP pneumonia, TRALI, seizure disorder for which she is on Keppra, depressive disorder, and uncomplicated asthma. She had removal of benign tumor from c-spine in approximately '02,  c-spine fusion in approxmately '15, and also previous shoulder surger. Prior to admission she was residing in assisted living. She was brought to the [facility] emergency department on 4/4/22 for evaluation for generalized weakness.       She had generalized weakness which had gradually gotten worse.  She had some right-sided upper extremity weakness. She had become unable to ambulate on her own or feed herself.  She had no cough or shortness of breath. She denied fever, vomiting, diarrhea, and urinary symptoms.  She had significantly decreased oral intake.      EMS was called and she was noted to be hypoxic with oxygen saturation 88%. She was given neb treatment.  She was brought to emergency room for evaluation.  On arrival to emergency room, her vital signs showed  temperature 99.9, pulse 94, blood pressure 113/74, and oxygen saturation 97% on 3 L. Laboratory work-up showed sodium 139, potassium 3.2, creatinine 1.42, , lactic acid 0.9, WBC 5.8, hemoglobin 9.7.  Procalcitonin 0.26.  Chest x-ray showed mild alveolar infiltrate in the left midlung.  MRI of the brain showed no evidence of acute ischemia or hemorrhage. Stable postoperative change in the left side craniotomy and tumor resection were noted.  MRA of the brain/neck was negative for high-grade stenosis.     She was admitted to the hospital for further management of pneumonia, including IV fluids and IV  antibiotics.  Neurology was contacted by phone and given unremarkable MRI/MRA she was not thought to have had a stroke. Ceftriaxone and doxycycline were started.  IV fluids were started. Olga was admitted to the hospital for further cares.  After admission she developed high fevers.  She remained lethargic and developed worsening confusion. There was some concern about aspiration developed so she was seen by speech therapy.  Modified diet was ordered.  Ceftriaxone was changed to Zosyn to cover aspiration.  Video swallow study was performed and modified diet was continued. Prior to admission Olga was on several psychoactive medications (seroquel, Zyprexa, Remeron, Buspar, Prozac, and ritalin). Seroquel dose was reduced and later discontinued. Olanzapine was changed to prn and not used. CT of chest was done on 4/8/22 and showed increased extensive linear, groundglass and patchy opacities in both lungs- likely due to atypical pneumonia, which may have been superimposed on underlying mild pulmonary fibrosis. Pulmonary medicine was consulted and ordered Fungitell and respiratory viral panel- all of which was negative. Palliative Care was consulted and patient expressed desire to be DNR/DNI and pursue hospice. This was discussed with the patient's daughter.  After some reflection, Olga's daughter expressed some concern about this decision being made with concerns about waxing and waning mental status. It was decided to change code status back to full code and not pursue hospice.  She had a period of improvement but on 4/12 developed recurrent fevers. She continued to have intermittent fevers and weakness.  IV Vancomycin was added. Olga's daughter noted ongoing aspiration, intermittently. Infectious disease was consulted. With concern about ongoing aspiration a more restrictive diet was ordered. Speech Therapy re-evaluated patient and continued the more restrictive diet. Zyprexa was stopped. Ritalin was  increased to prior to admission dose. Empiric steroids were considered given a previous episode of illness with pneumocystis pneumonia that improved with steroids.  Empiric prednisone was started by Pulmonary Medicine on 4/15/22. Olga seemed to improve with the combination of: more restrictive dysphagia diet, continued antibiotics,  weaning of psychoactive medications (seroquel, zyprexa, and Remeron), increase of ritalin, and trial of prednisone. Fevers resolved and strength and mental status improved.      Problem list:     Sepsis (fever and tachycardia) on presentation  Aspiration pneumonia, suspect recurrent  Acute hypoxic respiratory failure, improved  -Presented with weakness and poor oral intake  -Emergency department evaluation showed hypoxia and low-grade fever.  Procalcitonin was slightly elevated but white blood cell count and lactic acid were normal at the time of presentation.  Was admitted and treated with Rocephin and doxycycline.  -Developed higher fever with tachycardia overnight on 4/5/2022  -Ceftriaxone was changed to Zosyn to cover possible aspiration pneumonia (dysphagia noted during this hospitalization, see below)  -Swallow eval showed dysphagia for which modified diet was ordered  -CT of chest was ordered on 4/8/22 and showed Increased extensive linear, groundglass and patchy opacities in both lungs- likely due to atypical pneumonia, which may have been superimposed on underlying mild pulmonary fibrosis.  -Pulmonary medicine was consulted on 4/8/22.  -Fungitell and respiratory viral panel were negative.   -Fevers recurred on 4/12 and Vancomycin was started.  -ID was consulted  -Patient's daughter noted further episodes of aspiration, intermittently so diet was made more restrictive. Speech Therapy revisited patient and continued more restrictive diet.   -Overall, course seems most consistent with advanced chronic illness complicated by weakness and deconditioning, associated dysphagia, and  recurrent episodes of aspiration pneumonia  -Empiric trial of prednisone ordered by pulmonary medicine 4/15/22 (30 mg daily for 10 days then reduce by 10 mg every to days to 0)  -Tentative plan was-  if no improvement and continued fevers trial of nasal feeding tube and tube feeds to allow reduce aspiration risk and give opportunity to get stronger. I confirmed with Olga that she would want a trial of nasal of feeding tube if there was a chance it would help.   -Olga seemed to improve with the combination of: more restrictive dysphagia diet, continued antibiotics,  weaning of psychoactive medications (seroquel, Zyprexa, and Remeron), increase of ritalin, and trial of prednisone. Fevers and hypoxia have resolved and strength and mental status have improved.     Dysphagia  -Speech therapy consult appreciated  -Video swallow study done   -Patient continues to follow the more restricted diet that was started empirically on 4/14 and continued by speech therapy on 4/15.  -Continue Zosyn for aspiration pneumonia  -Anticipate Speech Therapy and Nutrition to follow closely after discharge to TCU      Weakness on presentation  Physical deconditioning and weakness  Right upper extremity weakness more than left; acute on chronic  Chronic right upper extremity weakness after two prior c-spine surgeries and shoulder surgery  -There was report of some right-sided weakness and also then some left-sided weakness on presentation.  -Patient had had some right sided weakness for 6 months or more per daughter- possibly residual deficits after two previous c spine surgeries and shoulder surgery. Right arm weakness has been more pronounced, however, since just before presentation, but patient has been weak overall, also   -MRI of brain was done at the time of presentation showed no stroke  -MRI of c-spine on 4/16 showed severe spinal stenosis at C6-7 with severe left foraminal stenosis at C6-7, moderate to severe bilateral  foraminal stenosis at C4-5 and C5-6, and severe left foraminal stenosis at C3-4. None of this seems to explain worsened right upper extremity weakness.   -I suspect that weaknesses is generalized due to chronic illness and physical deconditioning likely exacerbated by acute and subacute illness with aspiration pneumonia. I suspect that her right upper extremity symptoms are more pronounced because they are worse at baseline.   -Continue PT here and at TCU     Hypokalemia, intermittent  -Replace per protocol     History of glioblastoma multiforme in the left parietal region status post complete resection in March 2021  -Follows with [facility]. Was on radiation 5/2021.  Started adjuvant therapy with temozolomide which was complicated by hematologic toxicity so changed to metronomic/daily dosing on 7/2021 which was tolerated and with which she showed positive treatment effects.  She has completed 6 months of adjuvant temozolomide as of 12/2021. Follow-up imaging 2/2022 without concern and plan was  to remain off treatment and on imaging surveillance moving forward.     History of seizure disorder  -On Keppra     Chronic kidney disease  -Baseline creatinine is around 1.3-1.45 but it has been lower here in the hospital (closer to 0.9)  -Off IV fluids     Depression  Deconditioning  Poor functional status  Intermittent metabolic encephalopathy  Intermittent infectious encephalopathy  -Prior to admission was on BuSpar, fluoxetine, Ritalin, Zyprexa, and Seroquel.  She was followed by PMR and palliative care as an outpatient.  She had been noted to have declined in her functional status that was thought to possibly be due, in part, to depression and lack of inspiration/motivation.  It was not clear that all of these medications were helping.  Patient has weaned off of Seroquel and Zyprexa.  Prior to admission dose of fluoxetine was decreased from 60 mg daily to 40 mg daily. Remeron was stopped on 4/15. Continue  BuSpar 30 mg twice daily, fluoxetine 40 mg by mouth daily, and Ritalin 5 mg TID.  -With improvement from 4/15 to 4/16 after starting prednisone AM cortisol was checked and was normal.       History of bilateral DVT and retroperitoneal hemorrhage while on anticoagulation now with IVC filter   -Found to have bilateral DVT during hospitalization last year and started on enoxaparin which was complicated by right-sided retroperitoneal hemorrhage requiring transfusion support.  She is now on anticoagulation with Xarelto.     Acute on chronic anemia  Pancytopenia  -Hemoglobin was down to 8.0 but then candelaria to 8.5 and is now down to mid 7s..  Baseline hemoglobin appears to be about 9.5-10.5.  No reports of bleeding per patient, daughter, or nursing.    -Continue prior to admission Xarelto   -If significant acute hemoglobin drop consider CT imaging to evaluate for retroperitoneal hematoma     Malnutrition  -Level of malnutrition: unspecified per nutrition        Goals of care  -I am encouraged by improvement over the last two days. Continue more restrictive diet, zosyn (transition to Augmentin on discharge), trial of prednisone, and PT. Monitor off of seroquel, zyprexa, and remeron. Not planning to reduce psychiatric meds more at this time. If improvement continues consider discharge to TCU with Speech and nutrition to follow closely and PT and OT to assess and treat. If not improving due to aspiration consideration consider keotube placement for trial of temporary tube feeds in hope of improving strength.           Diet: Snacks/Supplements Adult: Magic Cup; With Meals  Pureed Diet (level 4) Liquidized/Moderately Thick (level 3)  Room Service    DVT Prophylaxis: DOAC  Martino Catheter: Not present  Central Lines: None  Cardiac Monitoring: None  Code Status: Full Code      Disposition Plan   Expected Discharge: 04/18/2022     Anticipated discharge location: TCU       The patient's care was discussed with the Bedside Nurse,  Patient and Patient's Family.    Srikanth Walsh MD  Hospitalist Service  [facility]  Securely message with the [employer] [facility] Web Console (learn more here)  Text page via [employer] Paging/Directory         Clinically Significant Risk Factors Present on Admission                    ______________________________________________________________________    Interval History   Feeling better again today. No chest pain or shortness of breath. No abdominal pain, nausea, vomiting, or diarrhea.     Data reviewed today: I reviewed all medications, new labs and imaging results over the last 24 hours.     Physical Exam   Vital Signs: Temp: 98  F (36.7  C) Temp src: Oral BP: 125/79 Pulse: 82   Resp: 16 SpO2: 94 % O2 Device: None (Room air) Oxygen Delivery: 2 LPM  Weight: 145 lbs 1.6 oz  GENERAL:  Comfortable. Cooperative.  PSYCH: pleasant, oriented, No acute distress.  EYES: PERRLA, Normal conjunctiva.  HEART:  Regular rate and rhythm. No JVD. Pulses normal. No edema.  LUNGS:  Clear to auscultation, normal Respiratory effort.  ABDOMEN:  Soft, no hepatosplenomegaly, normal bowel sounds.  EXTREMETIES: No clubbing, cyanosis or ischemia  SKIN:  Dry to touch, No rash.    Data   Recent Labs   Lab 04/17/22  0657 04/16/22  2320 04/16/22  0734 04/15/22  1417 04/15/22  0649 04/14/22  1631 04/14/22  0658   WBC 5.1  --  4.7  --   --   --  5.8   HGB 7.3*  --  7.3*  --   --   --  7.6*   MCV 99  --  98  --   --   --  98     --  198  --   --   --  184   *  --  144  --   --   --  141   POTASSIUM 3.4 3.9 3.4   < > 3.2*   < > 3.0*   CHLORIDE 112*  --  112*  --   --   --  109   CO2 29  --  29  --   --   --  28   BUN 21  --  18  --   --   --  10   CR 0.91  --  0.92  --  0.87  --  0.92   ANIONGAP 4  --  3  --   --   --  4   ESTIVEN 9.7  --  9.4  --   --   --  8.9   GLC 93  --  103*  --   --   --  97    < > = values in this interval not displayed.

## 2022-04-17 NOTE — PROVIDER NOTIFICATION
[id number] MD paged: Bladder scan 308 mL this AM. Already straight cathed x2 yesterday. Should I straight cath again or would you like to place orders for indwelling catheter? Please advise, thanks!   ----- Will continue to straight cath per protocol per Dr. Arevalo

## 2022-04-18 VITALS
SYSTOLIC BLOOD PRESSURE: 143 MMHG | TEMPERATURE: 97.8 F | BODY MASS INDEX: 25.64 KG/M2 | WEIGHT: 144.7 LBS | RESPIRATION RATE: 20 BRPM | OXYGEN SATURATION: 92 % | HEIGHT: 63 IN | HEART RATE: 76 BPM | DIASTOLIC BLOOD PRESSURE: 74 MMHG

## 2022-04-18 LAB
ANION GAP SERPL CALCULATED.3IONS-SCNC: 5 MMOL/L (ref 3–14)
BACTERIA BLD CULT: NO GROWTH
BACTERIA BLD CULT: NO GROWTH
BUN SERPL-MCNC: 22 MG/DL (ref 7–30)
CALCIUM SERPL-MCNC: 9.6 MG/DL (ref 8.5–10.1)
CHLORIDE BLD-SCNC: 110 MMOL/L (ref 94–109)
CO2 SERPL-SCNC: 28 MMOL/L (ref 20–32)
CREAT SERPL-MCNC: 0.82 MG/DL (ref 0.52–1.04)
ERYTHROCYTE [DISTWIDTH] IN BLOOD BY AUTOMATED COUNT: 12.2 % (ref 10–15)
GFR SERPL CREATININE-BSD FRML MDRD: 74 ML/MIN/1.73M2
GLUCOSE BLD-MCNC: 90 MG/DL (ref 70–99)
HCT VFR BLD AUTO: 28 % (ref 35–47)
HGB BLD-MCNC: 8.7 G/DL (ref 11.7–15.7)
MCH RBC QN AUTO: 30.9 PG (ref 26.5–33)
MCHC RBC AUTO-ENTMCNC: 31.1 G/DL (ref 31.5–36.5)
MCV RBC AUTO: 99 FL (ref 78–100)
PLATELET # BLD AUTO: 229 10E3/UL (ref 150–450)
POTASSIUM BLD-SCNC: 3.5 MMOL/L (ref 3.4–5.3)
RBC # BLD AUTO: 2.82 10E6/UL (ref 3.8–5.2)
SODIUM SERPL-SCNC: 143 MMOL/L (ref 133–144)
WBC # BLD AUTO: 5 10E3/UL (ref 4–11)

## 2022-04-18 PROCEDURE — 80048 BASIC METABOLIC PNL TOTAL CA: CPT | Performed by: INTERNAL MEDICINE

## 2022-04-18 PROCEDURE — 250N000013 HC RX MED GY IP 250 OP 250 PS 637: Performed by: INTERNAL MEDICINE

## 2022-04-18 PROCEDURE — 250N000009 HC RX 250: Performed by: HOSPITALIST

## 2022-04-18 PROCEDURE — 250N000011 HC RX IP 250 OP 636: Performed by: STUDENT IN AN ORGANIZED HEALTH CARE EDUCATION/TRAINING PROGRAM

## 2022-04-18 PROCEDURE — 85027 COMPLETE CBC AUTOMATED: CPT | Performed by: INTERNAL MEDICINE

## 2022-04-18 PROCEDURE — 99239 HOSP IP/OBS DSCHRG MGMT >30: CPT | Performed by: INTERNAL MEDICINE

## 2022-04-18 PROCEDURE — 250N000012 HC RX MED GY IP 250 OP 636 PS 637: Performed by: INTERNAL MEDICINE

## 2022-04-18 PROCEDURE — 250N000013 HC RX MED GY IP 250 OP 250 PS 637: Performed by: HOSPITALIST

## 2022-04-18 PROCEDURE — 36415 COLL VENOUS BLD VENIPUNCTURE: CPT | Performed by: INTERNAL MEDICINE

## 2022-04-18 PROCEDURE — 250N000013 HC RX MED GY IP 250 OP 250 PS 637: Performed by: STUDENT IN AN ORGANIZED HEALTH CARE EDUCATION/TRAINING PROGRAM

## 2022-04-18 PROCEDURE — 250N000011 HC RX IP 250 OP 636: Performed by: INTERNAL MEDICINE

## 2022-04-18 RX ORDER — SULFAMETHOXAZOLE/TRIMETHOPRIM 800-160 MG
1 TABLET ORAL
Qty: 6 TABLET | Refills: 0 | DISCHARGE
Start: 2022-04-18 | End: 2022-04-30

## 2022-04-18 RX ORDER — DOCOSANOL 100 MG/G
CREAM TOPICAL
DISCHARGE
Start: 2022-04-18 | End: 2023-12-18

## 2022-04-18 RX ORDER — AMOXICILLIN AND CLAVULANATE POTASSIUM 500; 125 MG/1; MG/1
1 TABLET, FILM COATED ORAL 2 TIMES DAILY
DISCHARGE
Start: 2022-04-18 | End: 2022-04-22

## 2022-04-18 RX ORDER — MULTIVITAMIN,THERAPEUTIC
1 TABLET ORAL DAILY
DISCHARGE
Start: 2022-04-19

## 2022-04-18 RX ORDER — PREDNISONE 10 MG/1
TABLET ORAL
DISCHARGE
Start: 2022-04-18 | End: 2022-07-18

## 2022-04-18 RX ORDER — METHYLPHENIDATE HYDROCHLORIDE 5 MG/1
5 TABLET ORAL DAILY
Qty: 30 TABLET | Refills: 0 | Status: SHIPPED | DISCHARGE
Start: 2022-04-18 | End: 2022-10-04

## 2022-04-18 RX ADMIN — FUROSEMIDE 20 MG: 10 INJECTION, SOLUTION INTRAMUSCULAR; INTRAVENOUS at 08:56

## 2022-04-18 RX ADMIN — SODIUM BICARBONATE 40 MG: 84 INJECTION, SOLUTION INTRAVENOUS at 08:58

## 2022-04-18 RX ADMIN — PIPERACILLIN AND TAZOBACTAM 3.38 G: 3; .375 INJECTION, POWDER, LYOPHILIZED, FOR SOLUTION INTRAVENOUS at 02:32

## 2022-04-18 RX ADMIN — FLUOXETINE 40 MG: 20 CAPSULE ORAL at 08:55

## 2022-04-18 RX ADMIN — AMLODIPINE BESYLATE 5 MG: 5 TABLET ORAL at 08:55

## 2022-04-18 RX ADMIN — MIRABEGRON 25 MG: 25 TABLET, FILM COATED, EXTENDED RELEASE ORAL at 08:56

## 2022-04-18 RX ADMIN — METHYLPHENIDATE HYDROCHLORIDE 5 MG: 5 TABLET ORAL at 08:55

## 2022-04-18 RX ADMIN — PREDNISONE 30 MG: 20 TABLET ORAL at 08:55

## 2022-04-18 RX ADMIN — LEVETIRACETAM 1000 MG: 500 TABLET, FILM COATED ORAL at 08:55

## 2022-04-18 RX ADMIN — LEVETIRACETAM 250 MG: 250 TABLET, FILM COATED ORAL at 08:56

## 2022-04-18 RX ADMIN — THERA TABS 1 TABLET: TAB at 08:56

## 2022-04-18 RX ADMIN — PIPERACILLIN AND TAZOBACTAM 3.38 G: 3; .375 INJECTION, POWDER, LYOPHILIZED, FOR SOLUTION INTRAVENOUS at 08:57

## 2022-04-18 RX ADMIN — BUSPIRONE HYDROCHLORIDE 30 MG: 15 TABLET ORAL at 08:56

## 2022-04-18 ASSESSMENT — ACTIVITIES OF DAILY LIVING (ADL)
ADLS_ACUITY_SCORE: 37
ADLS_ACUITY_SCORE: 27
ADLS_ACUITY_SCORE: 37
ADLS_ACUITY_SCORE: 27
ADLS_ACUITY_SCORE: 27
ADLS_ACUITY_SCORE: 37
ADLS_ACUITY_SCORE: 37

## 2022-04-18 NOTE — PLAN OF CARE
[id number]: A&Ox3, disoriented to time. VSS on RA. Turned/repositioned every 2 hours. Up with lift and assist of 2. LS clear. Infrequent cough. No SOB reported. Pureed, moderately thick liquid diet. Speech following. Take meds crushed in applesauce. Incontinent of bladder and bowel, purewick. Loose BM x 2. IV SL. Intermittent IV abx. Seizure precautions maintained. Discharge pending to TCU. Nursing will continue to monitor. K+ recheck in AM.

## 2022-04-18 NOTE — PLAN OF CARE
"--Goal Outcome Evaluation:   Assumed cares from [id number]. Pt is A&O to self only. No nonverbal S/S of pain noted, VSS on RA. Pt requires A 2 with all cares & lift transfer. Pt is on pureed diet and tolerating thick liquid level 3. Medication given crush in apple sauce. Potassium replacement protocol. Pt is on seizures precautions. IV Zosyn given per MAR. Palliative care consulted.  Pt was bladder scanned at 0326 for 36 ml. Bed alarm is on for safety and call light within reach.    BP (!) 142/78 (BP Location: Right arm)   Pulse 82   Temp 98.4  F (36.9  C) (Oral)   Resp 18   Ht 1.6 m (5' 3\")   Wt 65.8 kg (145 lb 1.6 oz)   SpO2 96%   BMI 25.70 kg/m      "

## 2022-04-18 NOTE — DISCHARGE SUMMARY
[facility]  Hospitalist Discharge Summary      Date of Admission:  4/4/2022  Date of Discharge:  4/18/2022  Discharging Provider: Srikanth Walsh MD  Discharge Service: Hospitalist Service    Discharge Diagnoses     Sepsis (fever and tachycardia) on presentation  Aspiration pneumonia, suspect recurrent  Acute hypoxic respiratory failure, resolved  Pulmonary fibrosis treated with steroid taper  Prior PJP pneumonia, on Bactrim while tapering steroids  Dysphagia  Modified diet ordered  Infectious encephalopathy  Metabolic encephalopathy  Toxic encephalopathy  Weakness   Physical deconditioning   Right upper extremity weakness; acute on chronic  Chronic right upper extremity weakness after two prior c-spine surgeries and shoulder surgery  Hypokalemia, intermittent  History of glioblastoma multiforme in the left parietal region status post complete resection in March 2021  History of seizure disorder  Chronic kidney disease, stage 2  Depression  History of bilateral DVT in 2021  Chronic anticoagulation  Retroperitoneal hemorrhage while on anticoagulation; now with IVC filter and back on Xarelto  Acute on chronic anemia, improved  Pancytopenia, improved  Severe protein calorie malnutrition  Cold sore on lip  Pulmonary fibrosis treated with steroid taper  Prior PJP pneumonia, on Bactrim while tapering steroids          Follow-ups Needed After Discharge   Follow-up Appointments     Follow Up and recommended labs and tests      Follow up with Nursing home physician/RICKY team  The following labs/tests   are recommended: weekly BMP and CBC.  Palliative Care to follow regarding care planning.    Speech Therapy to follow for dysphagia.  Nutrition to follow for severe malnutrition.  Physical therapy to follow for weakness and deconditioning.  OT to follow for weakness and ADLs                 Discharge Disposition   Discharged to short-term care facility  Condition at discharge: Stable      Hospital  Course   Olga Bailey is a 76 year old female with history of glioblastoma multiforme of the left parietal region who underwent craniotomy and complete resection in March 2021 as well as radiation and chemotherapy. She also has history of PJP pneumonia, TRALI, seizure disorder for which she is on Keppra, depressive disorder, and uncomplicated asthma. She had removal of benign tumor from c-spine in approximately '02,  c-spine fusion in approxmately '15, and also previous shoulder surger. Prior to this admission she was residing in assisted living. She was brought to the [facility] emergency department on 4/4/22 for evaluation for generalized weakness. She had generalized weakness which had gradually gotten worse.  She had some right-sided upper extremity weakness. She had become unable to ambulate on her own or feed herself.  She had no cough or shortness of breath. She denied fever, vomiting, diarrhea, and urinary symptoms.  She had significantly decreased oral intake.      EMS was called and she was noted to be hypoxic with oxygen saturation 88%. She was given a neb treatment.  She was brought to emergency room for evaluation.  On arrival to emergency room, her vital signs showed  temperature 99.9, pulse 94, blood pressure 113/74, and oxygen saturation 97% on 3 L. Laboratory work-up showed sodium 139, potassium 3.2, creatinine 1.42, , lactic acid 0.9, WBC 5.8, hemoglobin 9.7, and procalcitonin 0.26.  Chest x-ray showed mild alveolar infiltrate in the left midlung.  MRI of the brain showed no evidence of acute ischemia or hemorrhage. Stable postoperative change in the left side craniotomy and tumor resection were noted.  MRA of the brain/neck was negative for high-grade stenosis.     Olga was admitted to the hospital for further management of pneumonia with IV fluids and IV antibiotics.  Neurology was contacted by phone and given unremarkable MRI/MRA she was not thought to have had a  stroke. Ceftriaxone and doxycycline were given. After admission she developed high fevers.  She remained lethargic and developed worsening confusion. There was some concern about aspiration so she was seen by speech therapy.  Modified diet was ordered.  Ceftriaxone was changed to Zosyn to cover aspiration pneumonia.  Video swallow study was performed and modified diet was continued. Prior to admission Olga was on several psychoactive medications (seroquel, Zyprexa, Remeron, Buspar, Prozac, ritalin, and prn Trazodone). Trazodone was not restarted on admission. Seroquel dose was reduced and later discontinued. Olanzapine was changed to prn, not used, and ultimately stopped. CT of chest was done on 4/8/22 and showed increased extensive linear, groundglass and patchy opacities in both lungs- likely due to atypical pneumonia, which may have been superimposed on underlying mild pulmonary fibrosis. Pulmonary medicine was consulted and ordered Fungitell and respiratory viral panel- all of which was negative. Palliative Care was consulted and patient expressed desire to be DNR/DNI and pursue hospice. This was discussed with the patient's daughter.  After some reflection, Olga's daughter expressed some concern about this decision being made with concerns about waxing and waning mental status. It was decided to change code status back to full code and not pursue hospice.  Olga had a period of improvement but on 4/12 developed recurrent fevers. She continued to have intermittent fevers and weakness.  IV Vancomycin was added. Olga's daughter noted ongoing aspiration, intermittently. Infectious disease was consulted. With concern about ongoing aspiration a more restrictive diet was ordered. Speech Therapy re-evaluated patient and continued the more restrictive diet. Remeron was stopped. Ritalin was increased to prior to admission dose. Empiric steroids were considered given a previous episode of illness with  pneumocystis pneumonia that improved with steroids.  Empiric prednisone was started by Pulmonary Medicine on 4/15/22. Olga seemed to improve with the combination of: more restrictive dysphagia diet, continued antibiotics,  weaning/stopping of psychoactive medications (Trazodone, seroquel, zyprexa, and Remeron), increased of ritalin dose, and trial of prednisone. Fevers resolved, strength and mental status improved, and Olga weaned off of supplemental oxygen. With improvement from 4/15 to 4/16 after starting prednisone AM cortisol was checked and was normal.      Problem list:     Sepsis (fever and tachycardia) on presentation  Aspiration pneumonia, suspect recurrent  Acute hypoxic respiratory failure, improved  Infectious encephalopathy, improved  Metabolic encephalopathy, improved  -Presented with weakness and poor oral intake  -Emergency department evaluation showed hypoxia and low-grade fever.  Procalcitonin was slightly elevated but white blood cell count and lactic acid were normal at the time of presentation.  Was admitted and treated with Rocephin and doxycycline.  -Developed higher fever with tachycardia overnight on 4/5/2022  -Ceftriaxone was changed to Zosyn to cover possible aspiration pneumonia (dysphagia noted during this hospitalization, see below)  -Swallow eval showed dysphagia for which modified diet was ordered  -CT of chest was ordered on 4/8/22 and showed Increased extensive linear, groundglass and patchy opacities in both lungs- likely due to atypical pneumonia, which may have been superimposed on underlying mild pulmonary fibrosis.  -Pulmonary medicine was consulted on 4/8/22.  -Fungitell and respiratory viral panel were negative.   -Fevers recurred on 4/12 and Vancomycin was started.  -ID was consulted  -Patient's daughter noted further episodes of aspiration, intermittently so diet was made more restrictive. Speech Therapy revisited patient and continued more restrictive diet.    -Overall, course seemed most consistent with advanced chronic illness complicated by weakness and deconditioning, associated dysphagia, and recurrent episodes of aspiration pneumonia  -Empiric trial of prednisone ordered by pulmonary medicine 4/15/22 (30 mg daily for 10 days then reduce by 10 mg every to days to 0)  -Tentative plan was-  if no improvement and continued fevers trial of nasal feeding tube and tube feeds to allow reduce aspiration risk and give opportunity to get stronger. I confirmed with Olga that she would want a trial of nasal of feeding tube if there was a chance it would help.   -Olga seemed to improve with the combination of: more restrictive dysphagia diet, continued antibiotics,  weaning/stopping psychoactive medications (Trazodone, seroquel, Zyprexa, and Remeron), increased of ritalin, and trial of prednisone. Fevers and hypoxia resolved and strength and mental status improved.  -Discharge to TCU today with modified diet, Speech Therapy consult, 4 days of Augmentin to complete course of antibiotics for aspiration pneumonia, Prednisone taper, Bactrim 3 times weekly while on prednisone (PJP prophylaxis), Nutrition consult, PT consult, OT consult, and off of many prior to admission psychoactive medications ((Trazodone, seroquel, Zyprexa, and Remeron)     Dysphagia  -Speech therapy consult appreciated  -Video swallow study done   -Patient continues to follow the more restricted diet that was started empirically on 4/14 and continued by speech therapy on 4/15.  -Anticipate Speech Therapy and Nutrition to follow closely after discharge to TCU      Weakness   Physical deconditioning  Right upper extremity weakness more than left; acute on chronic  Chronic right upper extremity weakness after two prior c-spine surgeries and shoulder surgery  -There was report of some right-sided weakness and also then some left-sided weakness on presentation.  -Patient had had some right sided weakness for  6 months or more per daughter- possibly residual deficits after two previous c spine surgeries and shoulder surgery. Right arm weakness has been more pronounced, however, since just before presentation, but patient has been weak overall, also   -MRI of brain was done at the time of presentation showed no stroke  -MRI of c-spine on 4/16 showed severe spinal stenosis at C6-7 with severe left foraminal stenosis at C6-7, moderate to severe bilateral foraminal stenosis at C4-5 and C5-6, and severe left foraminal stenosis at C3-4. None of this seems to explain worsened right upper extremity weakness.   -I suspect that weaknesses is generalized due to chronic illness and physical deconditioning likely exacerbated by acute and subacute illness with aspiration pneumonia. I suspect that her right upper extremity symptoms are more pronounced because they were worse at baseline.   -PT and OT consults at TCU     Hypokalemia, intermittent  -Replaced per protocol    Euvolemic volume status  -PTA was on Lasix 40 mg daily  -Has not had volume overload here  -With thickened liquid diet I anticipate decreased oral intake so am not continuing Lasix or other diuretic on discharge.  -Monitor daily weights on discharge  -Weekly BMP  -Monitor volume status closely- may ultimately require restarting some diuretic     History of glioblastoma multiforme in the left parietal region status post complete resection in March 2021  -Follows with [facility]. Was on radiation 5/2021.  Started adjuvant therapy with temozolomide which was complicated by hematologic toxicity so changed to metronomic/daily dosing on 7/2021 which was tolerated and with which she showed positive treatment effects.  She has completed 6 months of adjuvant temozolomide as of 12/2021. Follow-up imaging 2/2022 without concern and plan was  to remain off treatment and on imaging surveillance moving forward.     History of seizure disorder  -On Keppra     Chronic  kidney disease stage 2  -Baseline creatinine is around 1.3-1.45 but it has been lower here in the hospital (closer to 0.9)  -Off IV fluids     Depression  Toxic encephalopathy, improved  -Prior to admission was on BuSpar, fluoxetine, Ritalin, Zyprexa, Seroquel, and prn Trazodone.  She was followed by PMR and palliative care as an outpatient.  She had been noted to have declined in her functional status that was thought to possibly be due, in part, to depression and lack of inspiration/motivation.  It was not clear that all of these medications were helping. After admission, Trazodone was stopped. Olga was weaned off of Seroquel and Zyprexa.  Prior to admission dose of fluoxetine was decreased from 60 mg daily to 40 mg daily. Remeron was stopped on 4/15. Continue BuSpar 30 mg twice daily, fluoxetine 40 mg by mouth daily, and Ritalin 5 mg daily.  -With improvement from 4/15 to 4/16 after starting prednisone AM cortisol was checked and was normal.       History of bilateral DVT and retroperitoneal hemorrhage while on anticoagulation now with IVC filter   -Found to have bilateral DVT during hospitalization last year and started on enoxaparin which was complicated by right-sided retroperitoneal hemorrhage requiring transfusion support.  She is now on anticoagulation with Xarelto.     Acute on chronic anemia, improved  Pancytopenia, improved  -Baseline hemoglobin appears to be about 9.5-10.5.    -Hemoglobin drifted down to mid 7s while here and has spontaneously rebounded to 8.7. No reports of bleeding per patient, daughter, or nursing. Suspect bone marrow depression due to infection and sepsis.   -Continue prior to admission Xarelto   -Weekly CBC on discharge    Severe malnutrition      Consultations This Hospital Stay   PHYSICAL THERAPY ADULT IP CONSULT  SPEECH LANGUAGE PATH ADULT IP CONSULT  OCCUPATIONAL THERAPY ADULT IP CONSULT  SOCIAL WORK IP CONSULT  CARE MANAGEMENT / SOCIAL WORK IP CONSULT  CARE MANAGEMENT /  SOCIAL WORK IP CONSULT  PHYSICAL THERAPY ADULT IP CONSULT  OCCUPATIONAL THERAPY ADULT IP CONSULT  PHYSICAL THERAPY ADULT IP CONSULT  PALLIATIVE CARE ADULT IP CONSULT  PULMONARY IP CONSULT  SOCIAL WORK IP CONSULT  SPIRITUAL HEALTH SERVICES IP CONSULT  CARE MANAGEMENT / SOCIAL WORK IP CONSULT  SOCIAL WORK IP CONSULT  PHYSICAL THERAPY ADULT IP CONSULT  PHARMACY TO DOSE VANCO  VASCULAR ACCESS ADULT IP CONSULT  INFECTIOUS DISEASES IP CONSULT  PHYSICAL THERAPY ADULT IP CONSULT  PHYSICAL THERAPY ADULT IP CONSULT  OCCUPATIONAL THERAPY ADULT IP CONSULT  SPEECH LANGUAGE PATH ADULT IP CONSULT  NUTRITION SERVICES ADULT IP CONSULT    Code Status   Full Code    Time Spent on this Encounter   I, Srikanth Walsh MD, personally saw the patient today and spent greater than 30 minutes discharging this patient.       Srikanth Walsh MD  [place] [address] MEDICAL SURGICAL  [address]  [facility] [address]  Phone: [phone]  Fax: [phone]  ______________________________________________________________________    Physical Exam   Vital Signs: Temp: 97.8  F (36.6  C) Temp src: Oral BP: (!) 143/74 Pulse: 76   Resp: 20 SpO2: 92 % O2 Device: None (Room air)    Weight: 144 lbs 11.2 oz  GENERAL:  Comfortable. Cooperative.  PSYCH: pleasant, oriented, No acute distress.  EYES: PERRLA, Normal conjunctiva.  HEART:  Regular rate and rhythm. No JVD. Pulses normal. No edema.  LUNGS:  Decreased breath sounds to auscultation, normal Respiratory effort.  ABDOMEN:  Soft, no hepatosplenomegaly, normal bowel sounds.  EXTREMETIES: No clubbing, cyanosis or ischemia  SKIN:  Dry to touch, No rash.         Primary Care Physician   [facility]    Discharge Orders      Medication Therapy Management Referral      General info for SNF    Length of Stay Estimate: Short Term Care: Estimated # of Days 31-90  Condition at Discharge: Improving  Level of care:skilled   Rehabilitation Potential: Fair  Admission H&P remains valid  and up-to-date: Yes  Recent Chemotherapy: 12/2021  Use Nursing Home Standing Orders: Yes     Mantoux instructions    Give two-step Mantoux (PPD) Per Facility Policy Yes, if needed     Follow Up and recommended labs and tests    Follow up with Nursing home physician/RICKY team  The following labs/tests are recommended: weekly BMP and CBC.  Palliative Care to follow regarding care planning.    Speech Therapy to follow for dysphagia.  Nutrition to follow for severe malnutrition.  Physical therapy to follow for weakness and deconditioning.  OT to follow for weakness and ADLs     Reason for your hospital stay    Dysphagia with recurrent aspiration pneumonia complicated by weakness, deconditioning, infectious encephalopathy and toxic encephalopathy due to psychotropic medications.     Daily weights    Call Provider for weight gain of more than 2 pounds per day or 5 pounds per week.     Activity - Up with nursing assistance     Full Code     Physical Therapy Adult Consult    Evaluate and treat as clinically indicated.    Reason:  weakness and deconditioning     Occupational Therapy Adult Consult    Evaluate and treat as clinically indicated.    Reason:  weakness and ADLs     Speech Language Path Adult Consult    Evaluate and treat as clinically indicated.    Reason:  Dysphagia     Nutrition Services Adult IP Consult    Reason:  Severe malnutrition     Fall precautions     Diet    Follow this diet upon discharge: Orders Placed This Encounter      Snacks/Supplements Adult: Magic Cup; With Meals      Room Service      Pureed Diet (level 4) Liquidized/Moderately Thick (level 3)       Significant Results and Procedures   Most Recent 3 CBC's:Recent Labs   Lab Test 04/18/22  0607 04/17/22  0657 04/16/22  0734   WBC 5.0 5.1 4.7   HGB 8.7* 7.3* 7.3*   MCV 99 99 98    206 198     Most Recent 3 BMP's:Recent Labs   Lab Test 04/18/22  0607 04/17/22  1606 04/17/22  0657 04/16/22  2320 04/16/22  0734     --  145*  --  144    POTASSIUM 3.5 4.1 3.4   < > 3.4   CHLORIDE 110*  --  112*  --  112*   CO2 28  --  29  --  29   BUN 22  --  21  --  18   CR 0.82  --  0.91  --  0.92   ANIONGAP 5  --  4  --  3   ESTIVEN 9.6  --  9.7  --  9.4   GLC 90  --  93  --  103*    < > = values in this interval not displayed.     Most Recent 2 LFT's:Recent Labs   Lab Test 02/03/22  0727 01/20/22  1746   AST 12 15   ALT <9 <9   ALKPHOS 90 96   BILITOTAL 0.4 0.3     Most Recent 6 Bacteria Isolates From Any Culture (See EPIC Reports for Culture Details):Recent Labs   Lab Test 05/26/21  1542 05/26/21  1535 05/26/21  1419 04/28/21  0836 04/28/21  0830 04/26/21  1613   CULT No growth No growth <10,000 colonies/mL  urogenital mary  Susceptibility testing not routinely done   No growth No growth No growth     Most Recent TSH and T4:Recent Labs   Lab Test 10/05/21  1313   TSH 2.19   ,   Results for orders placed or performed during the hospital encounter of 04/04/22   XR Chest 2 Views    Narrative    EXAM: XR CHEST 2 VW  LOCATION: [facility]  DATE/TIME: 4/4/2022 8:34 PM    INDICATION: Breast, hypoxia  COMPARISON: 5/2/2021.      Impression    IMPRESSION:   Shallow inspiration. Allowing for this there is the suggestion of a mild alveolar type infiltrate in the left midlung laterally. Direct auscultation may be beneficial to confirm.    Otherwise no acute abnormality or significant interval change. No discrete pleural effusion or pneumothorax. Anterior cervical spinal fusion. Degenerative change involving the spine. Interval removal of the right PICC.   MR Brain w/o & w Contrast    Narrative    EXAM: MR BRAIN W/O and W CONTRAST  LOCATION: [facility]  DATE/TIME: 4/4/2022 8:27 PM    INDICATION: Right-sided weakness, brain cancer, seizure.  COMPARISON: Head MRI 02/14/2022.  CONTRAST: 10 mL Gadavist.  TECHNIQUE: Routine multiplanar multisequence head MRI without and with intravenous contrast.    FINDINGS:  No evidence of acute  ischemia or hemorrhage.    Postoperative change of left-sided craniotomy with underlying gliosis/resection cavity involving the left inferior parietal lobule with areas of nonspecific enhancement/intrinsic T1 hyperintense signal along the resection cavity and hemosiderin   deposition, similar compared to the prior exam.    Volume loss is present with confluent white matter T2 hyperintensities throughout the cerebral hemispheres and ashwin which likely represent a combination of advanced chronic small vessel ischemic change and posttreatment change.    Subcentimeter nodule within the white matter along the frontal horn of the right lateral ventricle without corresponding enhancement or diffusion restriction, unchanged.    Dural based mass along the right occipital lobe measuring up to 12 mm in oblique anterior to posterior dimension, unchanged.    Marrow signal is within normal limits. Bilateral temporomandibular joint degenerative change. Visualized paranasal sinuses, tympanic cavities, and mastoid cavities are unremarkable.      Impression    IMPRESSION:  1.  No evidence of acute ischemia or hemorrhage.  2.  Stable postoperative change of left-sided craniotomy and tumor resection.  3.  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  4.  No change of dural based enhancing mass along the right occipital lobe, likely meningioma.  5.  Chronic/posttreatment changes as detailed.   MRA Brain ([place]) wo Contrast    Narrative    EXAM: MRA BRAIN ([place]) WO CONTRAST  LOCATION: [facility]  DATE/TIME: 4/4/2022 8:26 PM    INDICATION: Stroke, follow up.  COMPARISON: MRA 04/14/2022.  TECHNIQUE: 3D time-of-flight head MRA without intravenous contrast.    FINDINGS:  ANTERIOR CIRCULATION: No stenosis/occlusion, aneurysm, or high flow vascular malformation. Incidental 1 to 2 mm outpouchings at the origins of the bilateral  posterior communicating arteries, probably incidental infundibulum, unchanged. No convincing   evidence of aneurysm.    POSTERIOR CIRCULATION: No stenosis/occlusion, aneurysm, or high flow vascular malformation. Right vertebral artery is dominant.      Impression    IMPRESSION:  1.  No evidence of large vessel occlusion or high-grade stenosis.  2.  Incidental 1 to 2 mm outpouchings off the bilateral internal carotid arteries at the expected origins of posterior communicating arteries, favored to represent incidental infundibula, unchanged.   MRA Neck (Carotids) wo & w Contrast    Narrative    EXAM: MRA NECK (CAROTIDS) WO and W CONTRAST  LOCATION: [facility]  DATE/TIME: 4/4/2022 8:27 PM    INDICATION: Right-sided weakness.  COMPARISON: MRA of the neck 02/14/2022.  CONTRAST: 10 mL Gadavist.   TECHNIQUE: Neck MRA without and with IV contrast. Stenosis measurements made according to NASCET criteria unless otherwise specified.    FINDINGS:  RIGHT CAROTID: No measurable stenosis or dissection. Incidental right internal carotid artery tonsillar loop.    LEFT CAROTID: No measurable stenosis or dissection. Incidental left internal carotid artery tonsillar loop.    VERTEBRAL ARTERIES: No focal stenosis or dissection. Right vertebral artery is dominant.    AORTIC ARCH: Classic aortic arch anatomy with no significant stenosis at the origin of the great vessels.    Cervical spine postoperative change.      Impression    IMPRESSION:  1.  No evidence of large vessel occlusion or high-grade stenosis.   XR Video Swallow with SLP or OT    Narrative    XR VIDEO SWALLOW WITH SLP OR OT   4/7/2022 9:11 AM     HISTORY: dysphagia    COMPARISON: Swallow study on 6/11/2021    FINDINGS:  A swallow study was performed in coordination with a member  from the speech pathology service. Total fluoroscopy time was 2.5  minutes. 9 spot fluoroscopic images, and/or cine clips were obtained.  1 view in lateral projection. Various  consistencies of barium were  given to the patient to swallow, and the swallowing mechanism was  observed using fluoroscopy.    Deep penetration to the level of the cord without aspiration is noted  with thin barium. No penetration or aspiration is noted with other  consistencies of barium. Post surgical changes of the cervical spine  is also noted.      Impression    IMPRESSION:  1. Deep penetration without aspiration is noted with thin barium. No  penetration or aspiration is noted with other consistencies of barium.  2. Please refer to the speech pathology report for further details.    This study includes only the cervical esophagus.    SHARON QUINTANA MD         SYSTEM ID:  [id number]   CT Chest Hi-Resolution wo Contrast    Narrative    CT CHEST HI-RESOLUTION WO CONTRAST 4/8/2022 11:47 AM    CLINICAL HISTORY: Suspected ILD, previous PCP pneumonia.  TECHNIQUE: High resolution images were obtained through the chest  during inspiration with select expiratory views. Prone imaging was  performed. Multiplanar reformats were obtained. Dose reduction  techniques were used.    CONTRAST: None.    COMPARISON: 9/10/2021    FINDINGS:   LUNGS AND PLEURA: Since 9/10/2021, extensive bilateral linear,  groundglass and patchy opacities have increased. These are most  extensive in the upper lobes bilaterally with mild opacities in the  lower lobes, and right middle lobe lung bases. Stable mild traction  bronchiectasis. No honeycombing or air trapping. No bullae or  pneumothorax. No pleural effusion. No discrete masses.    MEDIASTINUM/AXILLAE: Surgical clips in the right lower neck, partly  visualized. No lymphadenopathy. No thoracic aortic aneurysm. Mild  coronary artery calcifications. No pericardial effusion. Small hiatal  hernia.    UPPER ABDOMEN: See separate report of the CT of the abdomen and  pelvis.    MUSCULOSKELETAL: Instrumented fusion of the cervical spine. Mild  degenerative changes in the spine. No suspicious  lesions in the bones.      Impression    IMPRESSION:   1.  Increased extensive linear, groundglass and patchy opacities in  both lungs. This is likely due to atypical pneumonia, which may be  superimposed on underlying mild pulmonary fibrosis.    ELIANA PALACIOS MD         SYSTEM ID:  [id number]   CT Abdomen Pelvis w Contrast    Narrative    CT ABDOMEN PELVIS W CONTRAST 4/8/2022 12:04 PM    CLINICAL HISTORY: Abdominal pain, acute, nonlocalized    TECHNIQUE: CT scan of the abdomen and pelvis was performed following  injection of IV contrast. Multiplanar reformats were obtained. Dose  reduction techniques were used.  CONTRAST: 75mL [id number]    COMPARISON: 5/26/2021    FINDINGS:   LOWER CHEST: See separate report of chest CT today    HEPATOBILIARY: No focal lesions of the liver. Distended gallbladder  without calcified gallstones. Mild central intrahepatic biliary ductal  dilatation and extrahepatic biliary duct dilatation up to 9 mm with  smooth distal tapering. This is stable, previously measuring 9 mm.    PANCREAS: No significant mass, duct dilatation, or inflammatory  change.    SPLEEN: Normal.    ADRENAL GLANDS: Normal.    KIDNEYS/BLADDER: No hydronephrosis, calculi or suspicious renal  masses. Mild nonspecific perinephric stranding around the lower pole  of both kidneys.    BOWEL: Large amount of formed stool in the rectum. Mild colonic  diverticulosis. Residual contrast in the colon from the speech study 2  days ago. No small bowel or colonic obstruction. No inflammatory  changes. Nothing for appendicitis.    PELVIC ORGANS: Hysterectomy.    ADDITIONAL FINDINGS: Previously seen large right retroperitoneal  hematoma has resolved with mild linear stranding in the right  posterior retroperitoneum. Mild nonspecific presacral soft tissue  thickening. No free fluid or fluid collections. No abdominal aortic  aneurysm. IVC filter.    MUSCULOSKELETAL: Mild degenerative changes in the spine. No suspicious  lesions in the  bones.      Impression    IMPRESSION:   1.  Mild nonspecific stranding around the lower poles of the kidneys.  Correlate clinically for pyelonephritis.  2.  Resolved large right retroperitoneal hematoma.  3.  Stable mild central intrahepatic and extrahepatic biliary ductal  dilatation. No calcified gallstones or obstructing masses.    ELIANA PALACIOS MD         SYSTEM ID:  [id number]   XR Chest Port 1 View    Narrative    EXAM: XR CHEST PORT 1 VIEW  LOCATION: [facility]  DATE/TIME: 4/13/2022 5:50 AM    INDICATION: worsening respiratory failure  COMPARISON: 04/04/2022      Impression    IMPRESSION: Heart size is normal. Lung volumes are mildly diminished. There are increased interstitial opacities bilaterally, with relative sparing of the right lower lobe. Differential considerations include asymmetric edema or multifocal interstitial   pneumonitis. No pneumothorax. No acute bony abnormality.   MR Cervical Spine w/o Contrast    Narrative    EXAM: MR CERVICAL SPINE W/O CONTRAST  LOCATION: [facility]  DATE/TIME: 4/16/2022 6:16 PM    INDICATION: Cervical radiculopathy, no red flags  COMPARISON: None.  TECHNIQUE: MRI Cervical Spine without IV contrast.    FINDINGS:   Status post ACDF at C4-C5. Chronic grade 1 anterolisthesis of C3 over C4.  Normal vertebral body heights, alignment and marrow signal. No abnormal cord signal. No extraspinal abnormality.    Craniovertebral junction and C1-C2: Normal.    C2-C3: Normal disc height, mild degenerative signal. No herniation. Normal facets. No spinal canal or neural foraminal stenosis.     C3-C4: Normal disc height. No herniation. Large left uncovertebral spur. Moderate to severe bilateral facet hypertrophy. No spinal canal stenosis. Severe left foraminal stenosis.     C4-C5: Status post ACDF, adequate fusion. Mild bilateral uncovertebral hypertrophy. Fusion of right facet joint, mild left facet hypertrophy. No spinal canal stenosis.  Moderate bilateral foraminal stenosis.     C5-C6: Status post ACDF, adequate fusion. Mild bilateral uncovertebral hypertrophy. Moderate bilateral facet hypertrophy. No spinal canal stenosis. Moderate to severe bilateral foraminal stenosis.     C6-C7: Decreased disc height at degeneration. Broad-based posterior disc osteophyte complex. Moderate to severe facet hypertrophy. Severe spinal canal stenosis. Mild right and severe left foraminal stenosis.     C7-T1: Decreased disc height with mild degenerative signal. No herniation. Moderate bilateral. No spinal canal or neural foraminal stenosis. There is a 3 mm perineural cyst in the right neural foramen.      Impression    IMPRESSION:  1.  Severe spinal stenosis at C6-C7 with severe left foraminal stenosis at this level.  2.  Moderate to severe bilateral foraminal stenosis at C5-C6 and C4-C5.  3.  Severe left foraminal stenosis at C3-C4.       *Note: Due to a large number of results and/or encounters for the requested time period, some results have not been displayed. A complete set of results can be found in Results Review.       Discharge Medications   Current Discharge Medication List      START taking these medications    Details   amoxicillin-clavulanate (AUGMENTIN) 500-125 MG tablet Take 1 tablet by mouth 2 times daily for 4 days    Associated Diagnoses: Aspiration pneumonia of both lower lobes, unspecified aspiration pneumonia type (H)      docosanol (ABREVA) 10 % CREA cream Apply topically 5 times daily Apply to cold sore on lip    Associated Diagnoses: Cold sore      melatonin 1 MG TABS tablet Take 1 tablet (1 mg) by mouth nightly as needed for sleep    Associated Diagnoses: Insomnia, unspecified type      multivitamin, therapeutic (THERA-VIT) TABS tablet Take 1 tablet by mouth daily    Associated Diagnoses: Severe malnutrition (H)      predniSONE (DELTASONE) 10 MG tablet By mouth, take 3 tabs daily for 6 days, then 2 tabs daily for 2 days, then 1 tab daily for  2 days, then stop.    Associated Diagnoses: Pulmonary fibrosis (H)      sulfamethoxazole-trimethoprim (BACTRIM DS) 800-160 MG tablet Take 1 tablet by mouth Every Mon, Wed, Fri Morning for 6 doses  Qty: 6 tablet, Refills: 0    Associated Diagnoses: Pulmonary fibrosis (H)         CONTINUE these medications which have CHANGED    Details   FLUoxetine (PROZAC) 20 MG capsule Take 2 capsules (40 mg) by mouth daily  Qty: 30 capsule, Refills: 0    Associated Diagnoses: Adjustment disorder with depressed mood; Episode of recurrent major depressive disorder, unspecified depression episode severity (H)      methylphenidate (RITALIN) 5 MG tablet Take 1 tablet (5 mg) by mouth daily Start taking 1/2 tablet daily in the morning for 7 days, then increase to 1 full tablet daily.  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 levETIRAcetam (KEPPRA) 1000 MG tablet Take 1,000 mg by mouth 2 times daily Give with 250mg tab = 1250mg total dose twice daily      !! levETIRAcetam (KEPPRA) 250 MG tablet Take 250 mg by mouth 2 times daily Give with 100mg tab = 1250mg      loperamide (IMODIUM) 2 MG capsule TAKE 2 CAPSULES (4MG) BY MOUTH AFTER FIRST LOOSE STOOL, THEN;TAKE 1 CAPSULE AFTER CONSECUTIVE STOOLS, MAX 4 CAPS/24H      mirabegron (MYRBETRIQ) 25 MG 24 hr tablet Take 25 mg by mouth daily      ondansetron (ZOFRAN) 4 MG tablet Take 1 tablet (4 mg) by mouth every 8 hours as needed for nausea  Qty: 30 tablet, Refills: 3    Associated Diagnoses: Chemotherapy-induced nausea and vomiting      pantoprazole (PROTONIX) 40 MG EC tablet Take 1 tablet (40 mg) by mouth 2 times daily (before meals)  Qty: 30 tablet, Refills: 0    Associated Diagnoses: Gastroesophageal reflux disease without esophagitis      rivaroxaban ANTICOAGULANT (XARELTO ANTICOAGULANT) 20 MG TABS tablet Take 1 tablet (20 mg) by mouth daily (with dinner)  Qty: 30 tablet, Refills: 3    Associated Diagnoses: GBM (glioblastoma multiforme) (H)      Nutritional Supplements (ENSURE CLEAR) LIQD TAKE ONE CAN BY MOUTH TWICE DAILY IN BETWEEN MEALS FOR WEIGHT LOSS       !! - Potential duplicate medications found. Please discuss with provider.      STOP taking these medications       calcium polycarbophil (FIBERCON) 625 MG tablet Comments:   Reason for Stopping:         furosemide (LASIX) 40 MG tablet Comments:   Reason for Stopping:         mirtazapine (REMERON) 15 MG tablet Comments:   Reason for Stopping:         OLANZapine (ZYPREXA) 5 MG tablet Comments:   Reason for Stopping:         potassium chloride (KAYCIEL) 20 MEQ/15ML (10%) solution Comments:   Reason for Stopping:         QUEtiapine (SEROQUEL) 50 MG tablet Comments:   Reason for Stopping:         traZODone (DESYREL) 50 MG tablet Comments:   Reason for Stopping:             Allergies   Allergies   Allergen Reactions     Pilocarpine  Headache and Nausea and Vomiting     Chlorhexidine Itching and Rash     Aripiprazole Headache and Other (See Comments)     Insomnia, nightmares     Bacitracin Rash     Bactroban is effective; No difficulties     Erythromycin      intolerant.     Meperidine Hcl      intolerant only   Demerol     Chloraprep One Step Rash

## 2022-04-18 NOTE — PLAN OF CARE
Physical Therapy Discharge Summary    Reason for therapy discharge:    Discharged to transitional care facility.    Progress towards therapy goal(s). See goals on Care Plan in [facility] electronic health record for goal details.  Goals not met.  Barriers to achieving goals:   discharge from facility.    Therapy recommendation(s):      Per chart review and treating therapist's note:  Pt below baseline with all mobility, currently requiring Mod/Max A x 2 to perform a stand pivot transfer bed > chair.  Continued skilled PT in the TCU setting to increase strength and progress independence with all mobility and determine the best long term DC recommendation  Goal Outcome Evaluation:

## 2022-04-18 NOTE — PLAN OF CARE
Pt discharged to [facility] TCU today at 1330.  Daughter La Nena present and spoke with SW.  Pt was afebrile, tolerated pureed-thickened diet for both breakfast and lunch: pt required total feed. Skin has some minimal scattered bruises, but otherwise intact.  Corners of mouth cracked and scabbed.  Oral cares including teeth brushing were done.        Goal Outcome Evaluation: Met

## 2022-04-18 NOTE — PROGRESS NOTES
Care Management Discharge Note    Discharge Date: 04/18/2022       Discharge Disposition: Skilled Nursing Facility, Transitional Care    Discharge Services: None    Discharge DME:  (lift)    Discharge Transportation:  [facility] w/c transport    Private pay costs discussed: transportation costs   Reviewed out of pocket cost for [facility] transport, $81.80 for base rate and $5.26 per mile to the destination. Spoke with the pt and her , they expressed understanding and are agreeable to this.     PAS Confirmation Code: [id number]  Patient/family educated on Medicare website which has current facility and service quality ratings: no - pt and her dtr knew which facilities they wanted referrals sent to.    Education Provided on the Discharge Plan:  yes  Persons Notified of Discharge Plans: Pt, pt's dtr La Nena, physician, pt's bedside nurse, and [facility]  Patient/Family in Agreement with the Plan: yes    Handoff Referral Completed: No    Additional Information:  [facility] at 1330.  Carmen confirmed the pt's discharge plan with the pt and her dtr La Nena.  They are agreeable to the plan.  Carmen updated Nafisa at [facility] on the pt's discharge time and sent her the pt's discharge orders, P: [phone].  Carmen updated Jailyn with [facility] on the pt's discharge today P: [phone].    You have successfully submitted the preadmission screening (PAS) to the Senior [employer] Line on:   Created On: 4/18/2022 11:38 AM  Your confirmation number is: [id number]    Sw will continue to be available as needed until discharge.    MACARIO Perez, [facility]  Inpatient Care Coordination  [facility]  [phone]

## 2022-04-19 ENCOUNTER — PATIENT OUTREACH (OUTPATIENT)
Dept: CARE COORDINATION | Facility: CLINIC | Age: 77
End: 2022-04-19
Payer: MEDICARE

## 2022-04-19 DIAGNOSIS — Z71.89 OTHER SPECIFIED COUNSELING: ICD-10-CM

## 2022-04-19 NOTE — PLAN OF CARE
"Speech Language Therapy Discharge Summary    Reason for therapy discharge:    Discharged to transitional care facility.    Progress towards therapy goal(s). See goals on Care Plan in [facility] electronic health record for goal details.  Goals not met.  Barriers to achieving goals:   discharge from facility.    Therapy recommendation(s):    Continued therapy is recommended.  Rationale/Recommendations:  Below baseline for swallowing requiring ongoing dysphagia intervention.     \"Continue puree (4) and moderately thick liquid diet (3) liquids by spoon to assist in controlling bolus size. Complete upright position, slow rate of intake, small bites/sips, and alternate solids and liquids. Overall, fluctuating tolerance of diet but did well this AM. Continue current diet with strict adherance to safe swallow strategies and precautions.\"    **Pt not seen by discharging therapist on this date, note written based on previous treating therapist's notes and recommendations        "

## 2022-04-19 NOTE — PROGRESS NOTES
Clinic Care Coordination Contact    Background: Care Coordination referral placed from [facility] discharge report for reason of patient meeting criteria for a TCM outreach call by [facility] team.    Assessment: Upon chart review, [facility] Team member will cancel/close the referral for TCM outreach due to reason below:    Patient is not established within [facility]. Upon chart review, CCRC Team member noted patient discharged to TCU/Nursing home    Plan: Care Coordination referral for TCM outreach canceled.    Renate Qureshi MA  [facility], [facility]

## 2022-04-19 NOTE — PLAN OF CARE
Occupational Therapy Discharge Summary    Reason for therapy discharge:    Discharged to transitional care facility.    Progress towards therapy goal(s). See goals on Care Plan in [facility] electronic health record for goal details.  Goals not met.  Barriers to achieving goals:   limited tolerance for therapy and discharge from facility.    Therapy recommendation(s):    Continued therapy is recommended.  Rationale/Recommendations:  OT eval and treat at TCU.      **Pt not seen by discharging therapist on this date, note written based on previous treating therapist's notes and recommendations

## 2022-04-27 ENCOUNTER — TELEPHONE (OUTPATIENT)
Dept: ONCOLOGY | Facility: CLINIC | Age: 77
End: 2022-04-27
Payer: MEDICARE

## 2022-04-27 NOTE — TELEPHONE ENCOUNTER
"Called per Dr. Baker's request to relay information regarding most recent MRI.   \"I reviewed the MRI; imaging is stable with no concerns.\"     Due to recent MRI and hospitalization, La Nena (daughter) was able to make the appointment for July 19: scan/labs/visit. She is concerned about her mother's overall health since being in the hospital for aspiration PNA. She is very weak and malnourished. She's currently in TCU and they may need to transition her to a different living situation once discharged. La Nena is afraid she might be on Hospice before getting to her next appointment. Dr. Baker made aware of daughter's concerns. Will continue to keep in touch with La Nena regarding pt's status.       Nafisa Vee, RN, BSN  Specialty Care Coordinator  [facility]  [phone]      "

## 2022-04-27 NOTE — TELEPHONE ENCOUNTER
La Nena is calling to update care team.    Patient is currently in rehab. She had a recent MRI and admission (4/4/22) and would like for Dr. Baker to review records.    Patient is scheduled for an MRI and office visit on 5/3. La Nena is wondering if these appointments need to be kept or rescheduled to a later date when patient is out of rehab.    Please review and advise when able.     Iwona Solitario RN, BSN, PHN  [facility]

## 2022-04-29 NOTE — PROGRESS NOTES
"Oncology Rooming Note    November 16, 2021 10:42 AM   Olga Bailey is a 76 year old female who presents for:    Chief Complaint   Patient presents with     Oncology Clinic Visit     GBM (glioblastoma multiforme) (H)     Initial Vitals: /79 (BP Location: Left arm, Patient Position: Sitting, Cuff Size: Adult Regular)   Pulse 71   Temp 97.4  F (36.3  C) (Oral)   Resp 16   Wt 68.4 kg (150 lb 12.8 oz)   SpO2 96%   BMI 26.71 kg/m   Estimated body mass index is 26.71 kg/m  as calculated from the following:    Height as of 9/10/21: 1.6 m (5' 3\").    Weight as of this encounter: 68.4 kg (150 lb 12.8 oz). Body surface area is 1.74 meters squared.  No Pain (0) Comment: Data Unavailable   No LMP recorded. Patient has had a hysterectomy.  Allergies reviewed: Yes  Medications reviewed: Yes    Medications: Medication refills not needed today.  Pharmacy name entered into [facility]:    [id number] - [place], [place] - [address] AT [facility] [address]  [facility] - [place], [place] - [address]  [facility] [address] - [place], [place] - [address] [place]    Clinical concerns: no     Paula Turner CMA              " Try the gabapentin before bed to see if it helps with the RLS symptoms and sleep. It may work better than the trazodone. If you still can't sleep, you can take the trazodone as well.     Expect a call to schedule a sleep study and consult.     Get a COVID booster as soon as feasible!

## 2022-05-04 ENCOUNTER — LAB REQUISITION (OUTPATIENT)
Dept: LAB | Facility: CLINIC | Age: 77
End: 2022-05-04
Payer: MEDICARE

## 2022-05-04 DIAGNOSIS — R11.2 NAUSEA WITH VOMITING, UNSPECIFIED: ICD-10-CM

## 2022-05-04 DIAGNOSIS — T45.1X5A ADVERSE EFFECT OF ANTINEOPLASTIC AND IMMUNOSUPPRESSIVE DRUGS, INITIAL ENCOUNTER: ICD-10-CM

## 2022-05-11 ENCOUNTER — PATIENT OUTREACH (OUTPATIENT)
Dept: ONCOLOGY | Facility: CLINIC | Age: 77
End: 2022-05-11
Payer: MEDICARE

## 2022-05-11 NOTE — PROGRESS NOTES
Spoke briefly with daughter. Pt remains in TCU, upgraded diet to reg/nectar thick, still total care/ceiling lift for transfers. No discharge plan as of now. Will cont to follow and keep appt in July. Daughter knows to call with questions/concersns.       Nafisa Vee, RN, BSN  Specialty Care Coordinator  [facility]  [phone]

## 2022-06-03 ENCOUNTER — OFFICE VISIT (OUTPATIENT)
Dept: PULMONOLOGY | Facility: CLINIC | Age: 77
End: 2022-06-03
Attending: INTERNAL MEDICINE
Payer: MEDICARE

## 2022-06-03 VITALS
HEART RATE: 68 BPM | DIASTOLIC BLOOD PRESSURE: 87 MMHG | OXYGEN SATURATION: 96 % | WEIGHT: 139 LBS | SYSTOLIC BLOOD PRESSURE: 149 MMHG | BODY MASS INDEX: 20.59 KG/M2 | HEIGHT: 69 IN

## 2022-06-03 DIAGNOSIS — J84.9 ILD (INTERSTITIAL LUNG DISEASE) (H): ICD-10-CM

## 2022-06-03 DIAGNOSIS — J84.9 ILD (INTERSTITIAL LUNG DISEASE) (H): Primary | ICD-10-CM

## 2022-06-03 PROCEDURE — 94729 DIFFUSING CAPACITY: CPT | Performed by: INTERNAL MEDICINE

## 2022-06-03 PROCEDURE — 94375 RESPIRATORY FLOW VOLUME LOOP: CPT | Performed by: INTERNAL MEDICINE

## 2022-06-03 PROCEDURE — G0463 HOSPITAL OUTPT CLINIC VISIT: HCPCS | Mod: 25

## 2022-06-03 PROCEDURE — 99213 OFFICE O/P EST LOW 20 MIN: CPT | Mod: 25 | Performed by: INTERNAL MEDICINE

## 2022-06-03 NOTE — NURSING NOTE
Chief Complaint   Patient presents with     Follow Up     6 month follow up      Vitals were taken and medications were reconciled.     May Meeks RMA  12:59 PM

## 2022-06-03 NOTE — PROGRESS NOTES
Pulmonology Clinic Follow up Visit       Olag Bailey MRN# [id number]   YOB: 1945 Age: 76 year old       Reason for Visit: f/u after hospitalization for aspiration pneumonia          Assessment and Plan:     # Unspecified ILD  # Moderate restrictive lung disease with mild decreased diffusing capacity  # Resolving inflammatory versus infectious process  # Resolved acute hypoxemic respiratory failure     Olga Bailey is a 76 year old female with history of  left frontoparietal glioblastoma status post gross total resection on 2/23/2021, status post radiation therapy May 2021 and adjuvant therapy with temozolomide (complicated by significant hematologic toxicity); imaging from 8/2021 demonstrated positive treatment effect. Medical history also notable for HTN, GERD, asthma, anxiety, and depression.     Overall, pulmonary status is improved/stable. Appears to have recovered from aspiration pneumonia. Fibrosis likely chronic and nonprogressive given stable PFTs. Recommend adherence to modified diet and other aspiration precautions. Up to date on vaccinations including for pneumonia. Recommend getting COVID-19 booster #2 at the end of this month (five months after booster #1). Follow up in Pulmonary Clinic on an as-needed basis.    # Wheezing  # Occasional dyspnea on exertion  - PRN albuterol    Patient seen and discussed with Dr. Kevin Egan M.D.  Pulmonary and Critical Care Medicine Fellow  6/3/2022          History of Present Illness / Interval History:   Olga Bailey is a 76 year old female with history of  left frontoparietal glioblastoma status post gross total resection on 2/23/2021, status post radiation therapy May 2021 and adjuvant therapy with temozolomide (complicated by significant hematologic toxicity); imaging from 8/2021 demonstrated positive treatment effect. Medical history also notable for HTN, GERD, asthma, anxiety, and depression.    Recently admitted for  "aspiration pneumonia at [facility] in early April 2022. Underwent evaluation including video swallow study, modified diet recommended. Treated with IV antibiotics. Pulmonary Medicine consulted. Infectious workup unrevealing. Discharged home on short prednisone taper.    Today, she is feeling overall much improved. Denies any significant pulmonary symptoms such as dyspnea or cough. Not needing any inhalers. Not requiring supplemental oxygen. GBM surveillance imaging post-surgery remains stable. Currently up to date on immunizations, due for COVID booster #2 at the end of this month.        Review of Systems:   A complete 10 point ROS was performed and negative except per history above.         Physical Examination:     BP (!) 149/87   Pulse 68   Ht 1.753 m (5' 9\")   Wt 63 kg (139 lb)   SpO2 96%   BMI 20.53 kg/m      Gen: alert, resting comfortably in chair, in no acute distress  CV: RRR, NMRG  Resp: Bilateral scattered inspiratory crackles, no wheezing, breathing comfortably on room air  Skin: no obvious rashes on gross evaluation  Extremities: no edema, WWP; lower extremities in compression stockings  Neuro: alert and appropriate, no focal abnormalities        Data:     Transthoracic echocardiogram 4/28/2021:  Interpretation Summary  Global and regional left ventricular function is hyperkinetic with an EF >70%.  Right ventricular function, chamber size, wall motion, and thickness are  normal.  No pericardial effusion is present.  Pulmonary artery systolic pressure is normal.     CTA chest 5/2/2021:  IMPRESSION:   1. Exam is negative for acute pulmonary embolism.     2. Extensive bilateral pulmonary infiltrates, increased from previous exam.     CTA chest 5/26/2021:  IMPRESSION:   1. No pulmonary embolism. No evidence of right heart strain.  2. Extensive interlobular septal thickening and reticulation with superimposed groundglass opacities, which have significantly improved  since 5/2/2021, " likely resolving infectious/inflammatory process with possible superimposed pulmonary edema.  3. Trace right pleural effusion.     CT chest 9/10/2021:  IMPRESSION:   1. Bilateral subpleural reticular opacities and bronchiectasis with air trapping on expiratory images, which may represent an interstitial lung disease, such as hypersensitivity pneumonitis or NSIP versus sequela of prior PJP pneumonia. In addition, there is interlobular septal thickening, which may represent superimposed pulmonary edema.  2. Further decreased bilateral groundglass opacities compared to CT from 5/26/2021 and 5/2/2021, likely resolving infection. No new focal airspace opacity.     Pulmonary function tests 9/10/2021:   IMPRESSION:   - Six minute walk distance is reduced indicating a decrease in exercise tolerance.     - There is no significant oxygen desaturation during the six minute walk on room air.     - Moderate restriction.     - Mild diffusion defect.     - No previous studies available for comparison.    CT chest 4/8/2022:  IMPRESSION:   1.  Increased extensive linear, groundglass and patchy opacities in  both lungs. This is likely due to atypical pneumonia, which may be  superimposed on underlying mild pulmonary fibrosis.    Pulmonary function tests 6/3/2022: personally reviewed, possible mild restriction with moderately reduced diffusing capacity (uncorrected for hemoglobin). Testing did not meet ATS criteria.          Medications:     Current Outpatient Medications   Medication     acetaminophen (TYLENOL) 500 MG tablet     albuterol (PROAIR HFA/PROVENTIL HFA/VENTOLIN HFA) 108 (90 Base) MCG/ACT inhaler     albuterol (PROVENTIL) (2.5 MG/3ML) 0.083% neb solution     amLODIPine (NORVASC) 5 MG tablet     busPIRone HCl (BUSPAR) 30 MG tablet     docosanol (ABREVA) 10 % CREA cream     FLUoxetine (PROZAC) 20 MG capsule     ketoconazole (NIZORAL) 2 % external shampoo     levETIRAcetam (KEPPRA) 1000 MG tablet     levETIRAcetam (KEPPRA)  250 MG tablet     loperamide (IMODIUM) 2 MG capsule     melatonin 1 MG TABS tablet     methylphenidate (RITALIN) 5 MG tablet     mirabegron (MYRBETRIQ) 25 MG 24 hr tablet     multivitamin, therapeutic (THERA-VIT) TABS tablet     Nutritional Supplements (ENSURE CLEAR) LIQD     ondansetron (ZOFRAN) 4 MG tablet     pantoprazole (PROTONIX) 40 MG EC tablet     predniSONE (DELTASONE) 10 MG tablet     rivaroxaban ANTICOAGULANT (XARELTO ANTICOAGULANT) 20 MG TABS tablet     No current facility-administered medications for this visit.

## 2022-06-03 NOTE — LETTER
6/3/2022         RE: Olga Bailey  [address]  [place] [address]        Dear Colleague,    Thank you for referring your patient, Olga Bailey, to the [facility] LUNG SCIENCE AND [facility]. Please see a copy of my visit note below.    Pulmonology Clinic Follow up Visit       Olga Bailey MRN# [id number]   YOB: 1945 Age: 76 year old       Reason for Visit: f/u after hospitalization for aspiration pneumonia          Assessment and Plan:     # Unspecified ILD  # Moderate restrictive lung disease with mild decreased diffusing capacity  # Resolving inflammatory versus infectious process  # Resolved acute hypoxemic respiratory failure     Olga Bailey is a 76 year old female with history of  left frontoparietal glioblastoma status post gross total resection on 2/23/2021, status post radiation therapy May 2021 and adjuvant therapy with temozolomide (complicated by significant hematologic toxicity); imaging from 8/2021 demonstrated positive treatment effect. Medical history also notable for HTN, GERD, asthma, anxiety, and depression.     Overall, pulmonary status is improved/stable. Appears to have recovered from aspiration pneumonia. Fibrosis likely chronic and nonprogressive given stable PFTs. Recommend adherence to modified diet and other aspiration precautions. Up to date on vaccinations including for pneumonia. Recommend getting COVID-19 booster #2 at the end of this month (five months after booster #1). Follow up in Pulmonary Clinic on an as-needed basis.    # Wheezing  # Occasional dyspnea on exertion  - PRN albuterol    Patient seen and discussed with Dr. Kevin Egan M.D.  Pulmonary and Critical Care Medicine Fellow  6/3/2022          History of Present Illness / Interval History:   Olga Bailey is a 76 year old female with history of  left frontoparietal glioblastoma status post gross total resection on  "2/23/2021, status post radiation therapy May 2021 and adjuvant therapy with temozolomide (complicated by significant hematologic toxicity); imaging from 8/2021 demonstrated positive treatment effect. Medical history also notable for HTN, GERD, asthma, anxiety, and depression.    Recently admitted for aspiration pneumonia at [facility] in early April 2022. Underwent evaluation including video swallow study, modified diet recommended. Treated with IV antibiotics. Pulmonary Medicine consulted. Infectious workup unrevealing. Discharged home on short prednisone taper.    Today, she is feeling overall much improved. Denies any significant pulmonary symptoms such as dyspnea or cough. Not needing any inhalers. Not requiring supplemental oxygen. GBM surveillance imaging post-surgery remains stable. Currently up to date on immunizations, due for COVID booster #2 at the end of this month.        Review of Systems:   A complete 10 point ROS was performed and negative except per history above.         Physical Examination:     BP (!) 149/87   Pulse 68   Ht 1.753 m (5' 9\")   Wt 63 kg (139 lb)   SpO2 96%   BMI 20.53 kg/m      Gen: alert, resting comfortably in chair, in no acute distress  CV: RRR, NMRG  Resp: Bilateral scattered inspiratory crackles, no wheezing, breathing comfortably on room air  Skin: no obvious rashes on gross evaluation  Extremities: no edema, WWP; lower extremities in compression stockings  Neuro: alert and appropriate, no focal abnormalities        Data:     Transthoracic echocardiogram 4/28/2021:  Interpretation Summary  Global and regional left ventricular function is hyperkinetic with an EF >70%.  Right ventricular function, chamber size, wall motion, and thickness are  normal.  No pericardial effusion is present.  Pulmonary artery systolic pressure is normal.     CTA chest 5/2/2021:  IMPRESSION:   1. Exam is negative for acute pulmonary embolism.     2. Extensive bilateral pulmonary " infiltrates, increased from previous exam.     CTA chest 5/26/2021:  IMPRESSION:   1. No pulmonary embolism. No evidence of right heart strain.  2. Extensive interlobular septal thickening and reticulation with superimposed groundglass opacities, which have significantly improved  since 5/2/2021, likely resolving infectious/inflammatory process with possible superimposed pulmonary edema.  3. Trace right pleural effusion.     CT chest 9/10/2021:  IMPRESSION:   1. Bilateral subpleural reticular opacities and bronchiectasis with air trapping on expiratory images, which may represent an interstitial lung disease, such as hypersensitivity pneumonitis or NSIP versus sequela of prior PJP pneumonia. In addition, there is interlobular septal thickening, which may represent superimposed pulmonary edema.  2. Further decreased bilateral groundglass opacities compared to CT from 5/26/2021 and 5/2/2021, likely resolving infection. No new focal airspace opacity.     Pulmonary function tests 9/10/2021:   IMPRESSION:   - Six minute walk distance is reduced indicating a decrease in exercise tolerance.     - There is no significant oxygen desaturation during the six minute walk on room air.     - Moderate restriction.     - Mild diffusion defect.     - No previous studies available for comparison.    CT chest 4/8/2022:  IMPRESSION:   1.  Increased extensive linear, groundglass and patchy opacities in  both lungs. This is likely due to atypical pneumonia, which may be  superimposed on underlying mild pulmonary fibrosis.    Pulmonary function tests 6/3/2022: personally reviewed, possible mild restriction with moderately reduced diffusing capacity (uncorrected for hemoglobin). Testing did not meet ATS criteria.          Medications:     Current Outpatient Medications   Medication     acetaminophen (TYLENOL) 500 MG tablet     albuterol (PROAIR HFA/PROVENTIL HFA/VENTOLIN HFA) 108 (90 Base) MCG/ACT inhaler     albuterol (PROVENTIL) (2.5  MG/3ML) 0.083% neb solution     amLODIPine (NORVASC) 5 MG tablet     busPIRone HCl (BUSPAR) 30 MG tablet     docosanol (ABREVA) 10 % CREA cream     FLUoxetine (PROZAC) 20 MG capsule     ketoconazole (NIZORAL) 2 % external shampoo     levETIRAcetam (KEPPRA) 1000 MG tablet     levETIRAcetam (KEPPRA) 250 MG tablet     loperamide (IMODIUM) 2 MG capsule     melatonin 1 MG TABS tablet     methylphenidate (RITALIN) 5 MG tablet     mirabegron (MYRBETRIQ) 25 MG 24 hr tablet     multivitamin, therapeutic (THERA-VIT) TABS tablet     Nutritional Supplements (ENSURE CLEAR) LIQD     ondansetron (ZOFRAN) 4 MG tablet     pantoprazole (PROTONIX) 40 MG EC tablet     predniSONE (DELTASONE) 10 MG tablet     rivaroxaban ANTICOAGULANT (XARELTO ANTICOAGULANT) 20 MG TABS tablet     No current facility-administered medications for this visit.        Attestation signed by Jayna Brown MD at 6/6/2022 11:46 AM:  Pulmonary Attending Staff:  I have discussed Ms. Bailey's case with Dr. Egan-Pulmonary/Critical Care Fellow; reviewed the patient's available PFT and radiographic imaging data and met with her.  I agree with the findings, assessment and recommendations as outlined below by Dr. Egan.      Jayna Brown MD  #[id number]  Patient seen 6/3/2022

## 2022-06-04 NOTE — LETTER
"    8/2/2021         RE: Olga Bailey  [address] [address]        Dear Colleague,    Thank you for referring your patient, Olga Bailey, to the [facility]. Please see a copy of my visit note below.    Oncology Rooming Note    August 2, 2021 1:51 PM   Olga Bailey is a 75 year old female who presents for:    Chief Complaint   Patient presents with     Oncology Clinic Visit     Initial Vitals: There were no vitals taken for this visit. Estimated body mass index is 27.2 kg/m  as calculated from the following:    Height as of 7/30/21: 1.575 m (5' 2\").    Weight as of 7/30/21: 67.4 kg (148 lb 11.2 oz). There is no height or weight on file to calculate BSA.  Data Unavailable Comment: Data Unavailable   No LMP recorded. Patient has had a hysterectomy.  Allergies reviewed: Yes  Medications reviewed: Yes    Medications: Medication refills not needed today.  Pharmacy name entered into Agenda:    [id number] - [place], [place] - [address] AT [facility] [address]  [facility] - [place], [place] - [address]    Clinical concerns: None       Olga Mishra MA                HEMATOLOGY/ONCOLOGY HISTORY:  Ms. Bailey is a female with DVT and GBM.  1. CT chest angiogram on 03/26/2021 did not reveal any PE.  -Lower extremity ultrasound on 03/29/2021 revealed bilateral lower extremity DVT.    -The patient was started on anticoagulation with heparin and transitioned to Lovenox.    -The patient's hemoglobin decreased.  CT abdomen and pelvis on 04/14/2021 revealed large right retroperitoneal hematoma.  Anticoagulation was stopped.    -IVC filter was placed on 04/16/2021.  2. Eliquis started on 08/02/2021. Could not be started earlier because of temodar induced thrombocytopenia.    SUBJECTIVE:  Ms. Bailey is a 75-year-old female with a DVT and GBM.     She was diagnosed with bilateral lower extremity DVT on 03/29/2021.  " She was on anticoagulation with Lovenox.  The patient developed retroperitoneal hematoma in about 2 weeks after starting anticoagulation.  Anticoagulation was stopped and she had IVC filter placed.  Filter is still there.     Plan was to resume the anticoagulation.  Anticoagulation has not been started because of thrombocytopenia.     The patient has GBM and follows up with Dr. aBker.  The patient was started on temozolomide on 06/24/2021.  She got it for 5 days.  Her platelet was 305 on 06/24/2021.  It decreased to 26 on 07/19/2021.  The patient did not resume anticoagulation because of that.  The patient is now on metronomic dosing with temozolomide.  She was started on 100 mg daily on 07/24/2021.     The patient was brought to the clinic by her daughter, who is a nurse.  The patient's overall condition is stable.  She does have some leg swelling, but no leg pain.  No bleeding from any site.  No black stool.     Some headache.  Some dizziness.  No chest pain or shortness of breath. No cough.  No nausea or vomiting.     PHYSICAL EXAMINATION:    GENERAL:  She is alert and oriented x 3.  VITAL SIGNS: Reviewed.   Rest of the systems not examined.     LABORATORY:  CBC and CMP were reviewed.     ASSESSMENT:    1.  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  2.  GBM on temozolomide.  3.  Pancytopenia from temozolomide.     PLAN:    1.  Discussed regarding her thrombosis.  Her thrombosis provoked from malignancy.  The patient is not on anticoagulation.  She has an IVC filter.  IVC filter will help reduce the risk of pulmonary embolism, but it is not going to help with DVT.  I explained to the patient that her DVT can get worse without anticoagulation.  Because of that, I would recommend starting anticoagulation.  Labs today revealed a normal platelet.  We will start her on Eliquis 5 mg twice a day.  No loading dose because of her previous bleeding and severe thrombocytopenia.  We will  monitor CBC twice a week.  As long as platelet is above 50 we are okay to continue anticoagulation.  I explained to the patient that if she tolerates anticoagulation well without bleeding and if her platelet remains above 50, we will plan on getting an IVC filter removed in the near future.  She is agreeable for it.  2.  The patient's WBC and hemoglobin are also improving.  Those will be monitored.  3.  I will see her in 4-6 weeks' time.  Daughter had few questions, which were all answered.     TOTAL FACE-TO-FACE TIME SPENT:  40 minutes, more than 50% of the time spent in counseling and coordination of care.    Visit Date: 08/02/2021    SUBJECTIVE:  Ms. Bailey is a 75-year-old female with a DVT and GBM.    She was diagnosed with bilateral lower extremity DVT, on 03/29/2021.  She was on anticoagulation with Lovenox.  The patient developed retroperitoneal hematoma in about 2 weeks after starting anticoagulation.  Anticoagulation was stopped and she had IVC filter placed.  Filter is still there.    Plan was to resume the anticoagulation.  Anticoagulation has not been started because of thrombocytopenia.    The patient has GBM and follows up with Dr. Baker.  The patient was started on temozolomide on 06/24/2021.  She got it for 5 days.  Her platelet was 305 on 06/24/2021.  It decreased to 26 on 07/19/2021.  The patient did not resume anticoagulation because of that.  The patient is now on metronomic dosing with temozolomide.  She was started on 100 mg daily on 07/24/2021.    The patient was brought to the clinic by her daughter, who is a nurse.  The patient's overall condition is stable.  She does have some leg swelling, but no leg pain.  No bleeding from any site.  No black stool.    Some headache.  Some dizziness.  No chest pain or shortness of breath, no cough.  No nausea, vomiting.    PHYSICAL EXAMINATION:    GENERAL:  She is alert and oriented x 3.  VITAL SIGNS:  ***.     LABORATORY:  CBC and CMP were  reviewed.    ASSESSMENT:    1.  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  2.  GBM on temozolomide.  3.  Pancytopenia from temozolomide.    PLAN:    1.  Discussed regarding her thrombosis.  Her thrombosis provoked from malignancy.  The patient is not on anticoagulation.  She has an IVC filter.  IVC filter will help reduce the risk of pulmonary embolism, but it is not going to help with DVT.  I explained to the patient that her DVT can get worse without anticoagulation.  Because of that, I would recommend starting anticoagulation.  Labs today revealed a normal platelet.  We will start her on Eliquis 5 mg twice a day.  No loading dose because of her previous bleeding and severe thrombocytopenia.  We will monitor CBC twice a week.  As long as platelet is above 50 we are okay to continue anticoagulation.  I explained to the patient that if she tolerates anticoagulation well without bleeding and if her platelet remains above 50, we will plan on getting an IVC filter removed in the near future.  She is agreeable for it.  2.  The patient's WBC and hemoglobin are also improving.  Those will be monitored.  3.  I will see her in 4-6 weeks' time.  Daughter had few questions, which were all answered.    TOTAL FACE-TO-FACE TIME SPENT:  40 minutes, more than 50% of the time spent in counseling and coordination of care.    Rosenda Gamboa MD        D: 2021   T: 2021   MT: [id number]    Name:     RADHA RYAN  MRN:      [id number]        Account:    [id number]   :      1945           Visit Date: 2021     Document: [id number]      Again, thank you for allowing me to participate in the care of your patient.        Sincerely,        Rosenda Gamboa MD     [employer]

## 2022-06-07 LAB
DLCOUNC-%PRED-PRE: 55 %
DLCOUNC-PRE: 10.17 ML/MIN/MMHG
DLCOUNC-PRED: 18.35 ML/MIN/MMHG
ERV-%PRED-PRE: 52 %
ERV-PRE: 0.32 L
ERV-PRED: 0.62 L
EXPTIME-PRE: 5.93 SEC
FEF2575-%PRED-PRE: 77 %
FEF2575-PRE: 1.26 L/SEC
FEF2575-PRED: 1.63 L/SEC
FEFMAX-%PRED-PRE: 47 %
FEFMAX-PRE: 2.38 L/SEC
FEFMAX-PRED: 4.99 L/SEC
FEV1-%PRED-PRE: 67 %
FEV1-PRE: 1.33 L
FEV1FEV6-PRE: 79 %
FEV1FEV6-PRED: 78 %
FEV1FVC-PRE: 79 %
FEV1FVC-PRED: 78 %
FEV1SVC-PRE: 80 %
FEV1SVC-PRED: 70 %
FIFMAX-PRE: 2.17 L/SEC
FVC-%PRED-PRE: 65 %
FVC-PRE: 1.69 L
FVC-PRED: 2.56 L
IC-%PRED-PRE: 61 %
IC-PRE: 1.34 L
IC-PRED: 2.18 L
VA-%PRED-PRE: 56 %
VA-PRE: 2.52 L
VC-%PRED-PRE: 59 %
VC-PRE: 1.67 L
VC-PRED: 2.8 L

## 2022-06-11 ENCOUNTER — HEALTH MAINTENANCE LETTER (OUTPATIENT)
Age: 77
End: 2022-06-11

## 2022-06-16 NOTE — PLAN OF CARE
Occupational Therapy Discharge Summary    Reason for therapy discharge:    Discharged to transitional care facility.    Progress towards therapy goal(s). See goals on Care Plan in [facility] electronic health record for goal details.  Goals partially met.  Barriers to achieving goals:   discharge from facility.    Therapy recommendation(s):    Continued therapy is recommended.  Rationale/Recommendations:  to maximize IND in ADLs/IADLs.       Detail Level: Detailed Quality 226: Preventive Care And Screening: Tobacco Use: Screening And Cessation Intervention: Patient screened for tobacco use and is an ex/non-smoker No Quality 111:Pneumonia Vaccination Status For Older Adults: Pneumococcal vaccine was not administered on or after patientâs 60th birthday and before the end of the measurement period, reason not otherwise specified Quality 130: Documentation Of Current Medications In The Medical Record: Current Medications Documented Quality 431: Preventive Care And Screening: Unhealthy Alcohol Use - Screening: Patient not identified as an unhealthy alcohol user when screened for unhealthy alcohol use using a systematic screening method Quality 47: Advance Care Plan: Advance care planning not documented, reason not otherwise specified. Quality 110: Preventive Care And Screening: Influenza Immunization: Influenza immunization was not ordered or administered, reason not given

## 2022-06-30 NOTE — ED NOTES
"[facility]  ED Nurse Handoff Report    ED Chief complaint: Shortness of Breath and Leg Swelling      ED Diagnosis:   Final diagnoses:   Pneumonia due to 2019 novel coronavirus   Hypoxia       Code Status: see admitting MD    Allergies:   Allergies   Allergen Reactions     Bacitracin Rash     Bactroban is effective; No difficulties     Erythromycin      intolerant.     Meperidine Hcl      intolerant only   Demerol     Chloraprep One Step Rash       Patient Story: 75 year old female with recent diagnosis of glioblastoma and history of COPD and hypertension who presents with shortness of breath and leg swelling.    Focused Assessment:   Patient had a left parietal craniotomy for tumor resection on 2/23. The patient states that she has had increased swelling in her lower extremities left worse than right for the last 10 days. Today she began noticing a diffuse tightness in her chest along with shortness of breath making it difficult to take a deep breath. She feels that her breathing difficulty is different from her previous asthma experiences.       Treatments and/or interventions provided: labs, chest CT, EKG, Covid-19 swab  Patient's response to treatments and/or interventions: same    To be done/followed up on inpatient unit:  monitor    Does this patient have any cognitive concerns?: none    Activity level - Baseline/Home:  Independent  Activity Level - Current:   Stand with Assist    Patient's Preferred language: English   Needed?: No    Isolation: None and COVID r/o and special precautions  Infection: Not Applicable  COVID r/o and special precautions  Patient tested for COVID 19 prior to admission: YES  Bariatric?: No    Vital Signs:   Vitals:    03/26/21 1701 03/26/21 2009   BP: 125/68 (!) 140/79   Pulse: 77 67   Resp: 18 17   Temp: 98.3  F (36.8  C)    TempSrc: Temporal    SpO2: 94%    Weight: 69.9 kg (154 lb)    Height: 1.6 m (5' 3\")        Cardiac Rhythm:     Was the PSS-3 " 06/30/22 0948   Vital Signs   Temp 97.9 °F (36.6 °C)   Temp Source Oral   Heart Rate (!) 118   Heart Rate Source Monitor   Resp 18   BP (!) 140/79   BP Location Left upper arm   MAP (Calculated) 99.33   Patient Position Semi fowlers   Level of Consciousness Alert (0)   MEWS Score 3   Pain Assessment   Pain Assessment 0-10   Pain Level 0   Oxygen Therapy   SpO2 94 %   Pulse Oximeter Device Mode Intermittent   Pulse Oximeter Device Location Finger   O2 Device None (Room air)   Patient is resting showing no s/s of distress. Patient is alert and oriented. Meds were given, see MAR. Patient is denying any needs. Bed is in lowest position and call light is within reach. Will continue to monitor. Shift assessment complete, see flowsheets. Patient had a medium sized BM that was brown and formed. Patient was able to step to bedside commode but was very lethargic after all. completed:   Yes  What interventions are required if any?               Family Comments: patient request to have nursing staff update her care with her daughter La Nena Anderson at [phone]. La Nena is a [facility] Nurse here at [facility].  OBS brochure/video discussed/provided to patient/family: No              Name of person given brochure if not patient: na              Relationship to patient: na    For the majority of the shift this patient's behavior was Green.   Behavioral interventions performed were none.    ED NURSE PHONE NUMBER: [phone]

## 2022-07-19 ENCOUNTER — HOSPITAL ENCOUNTER (OUTPATIENT)
Dept: MRI IMAGING | Facility: CLINIC | Age: 77
Discharge: HOME | End: 2022-07-19
Attending: PSYCHIATRY & NEUROLOGY
Payer: MEDICARE

## 2022-07-19 ENCOUNTER — LAB (OUTPATIENT)
Dept: LAB | Facility: CLINIC | Age: 77
End: 2022-07-19
Attending: PSYCHIATRY & NEUROLOGY
Payer: MEDICARE

## 2022-07-19 ENCOUNTER — ONCOLOGY VISIT (OUTPATIENT)
Dept: ONCOLOGY | Facility: CLINIC | Age: 77
End: 2022-07-19
Attending: STUDENT IN AN ORGANIZED HEALTH CARE EDUCATION/TRAINING PROGRAM
Payer: MEDICARE

## 2022-07-19 ENCOUNTER — ONCOLOGY VISIT (OUTPATIENT)
Dept: ONCOLOGY | Facility: CLINIC | Age: 77
End: 2022-07-19
Attending: PSYCHIATRY & NEUROLOGY
Payer: MEDICARE

## 2022-07-19 VITALS
HEART RATE: 77 BPM | TEMPERATURE: 98.1 F | DIASTOLIC BLOOD PRESSURE: 86 MMHG | SYSTOLIC BLOOD PRESSURE: 138 MMHG | RESPIRATION RATE: 18 BRPM

## 2022-07-19 VITALS
OXYGEN SATURATION: 92 % | DIASTOLIC BLOOD PRESSURE: 86 MMHG | TEMPERATURE: 98.1 F | SYSTOLIC BLOOD PRESSURE: 138 MMHG | HEART RATE: 77 BPM

## 2022-07-19 DIAGNOSIS — R53.0 NEOPLASTIC MALIGNANT RELATED FATIGUE: ICD-10-CM

## 2022-07-19 DIAGNOSIS — T45.1X5A CHEMOTHERAPY-INDUCED NAUSEA AND VOMITING: ICD-10-CM

## 2022-07-19 DIAGNOSIS — R11.2 CHEMOTHERAPY-INDUCED NAUSEA AND VOMITING: ICD-10-CM

## 2022-07-19 DIAGNOSIS — C71.9 GLIOBLASTOMA (H): ICD-10-CM

## 2022-07-19 DIAGNOSIS — C71.9 GLIOBLASTOMA (H): Primary | ICD-10-CM

## 2022-07-19 DIAGNOSIS — R53.81 PHYSICAL DECONDITIONING: ICD-10-CM

## 2022-07-19 LAB
ALBUMIN SERPL-MCNC: 3.6 G/DL (ref 3.4–5)
ALP SERPL-CCNC: 88 U/L (ref 40–150)
ALT SERPL W P-5'-P-CCNC: 21 U/L (ref 0–50)
ANION GAP SERPL CALCULATED.3IONS-SCNC: 3 MMOL/L (ref 3–14)
AST SERPL W P-5'-P-CCNC: 15 U/L (ref 0–45)
BASOPHILS # BLD AUTO: 0 10E3/UL (ref 0–0.2)
BASOPHILS NFR BLD AUTO: 0 %
BILIRUB SERPL-MCNC: 0.4 MG/DL (ref 0.2–1.3)
BUN SERPL-MCNC: 18 MG/DL (ref 7–30)
CALCIUM SERPL-MCNC: 9.4 MG/DL (ref 8.5–10.1)
CHLORIDE BLD-SCNC: 105 MMOL/L (ref 94–109)
CO2 SERPL-SCNC: 29 MMOL/L (ref 20–32)
CREAT SERPL-MCNC: 0.88 MG/DL (ref 0.52–1.04)
EOSINOPHIL # BLD AUTO: 0.1 10E3/UL (ref 0–0.7)
EOSINOPHIL NFR BLD AUTO: 2 %
ERYTHROCYTE [DISTWIDTH] IN BLOOD BY AUTOMATED COUNT: 12.2 % (ref 10–15)
GFR SERPL CREATININE-BSD FRML MDRD: 68 ML/MIN/1.73M2
GLUCOSE BLD-MCNC: 104 MG/DL (ref 70–99)
HCT VFR BLD AUTO: 34.5 % (ref 35–47)
HGB BLD-MCNC: 11 G/DL (ref 11.7–15.7)
IMM GRANULOCYTES # BLD: 0 10E3/UL
IMM GRANULOCYTES NFR BLD: 0 %
LYMPHOCYTES # BLD AUTO: 0.4 10E3/UL (ref 0.8–5.3)
LYMPHOCYTES NFR BLD AUTO: 4 %
MCH RBC QN AUTO: 31.3 PG (ref 26.5–33)
MCHC RBC AUTO-ENTMCNC: 31.9 G/DL (ref 31.5–36.5)
MCV RBC AUTO: 98 FL (ref 78–100)
MONOCYTES # BLD AUTO: 0.6 10E3/UL (ref 0–1.3)
MONOCYTES NFR BLD AUTO: 6 %
NEUTROPHILS # BLD AUTO: 8 10E3/UL (ref 1.6–8.3)
NEUTROPHILS NFR BLD AUTO: 88 %
NRBC # BLD AUTO: 0 10E3/UL
NRBC BLD AUTO-RTO: 0 /100
PLATELET # BLD AUTO: 184 10E3/UL (ref 150–450)
POTASSIUM BLD-SCNC: 4.5 MMOL/L (ref 3.4–5.3)
PROT SERPL-MCNC: 6.7 G/DL (ref 6.8–8.8)
RBC # BLD AUTO: 3.52 10E6/UL (ref 3.8–5.2)
SODIUM SERPL-SCNC: 137 MMOL/L (ref 133–144)
WBC # BLD AUTO: 9.1 10E3/UL (ref 4–11)

## 2022-07-19 PROCEDURE — 99215 OFFICE O/P EST HI 40 MIN: CPT | Performed by: PSYCHIATRY & NEUROLOGY

## 2022-07-19 PROCEDURE — A9585 GADOBUTROL INJECTION: HCPCS | Performed by: PSYCHIATRY & NEUROLOGY

## 2022-07-19 PROCEDURE — G0463 HOSPITAL OUTPT CLINIC VISIT: HCPCS

## 2022-07-19 PROCEDURE — 255N000002 HC RX 255 OP 636: Performed by: PSYCHIATRY & NEUROLOGY

## 2022-07-19 PROCEDURE — 80053 COMPREHEN METABOLIC PANEL: CPT

## 2022-07-19 PROCEDURE — 70553 MRI BRAIN STEM W/O & W/DYE: CPT | Mod: ME

## 2022-07-19 PROCEDURE — 36415 COLL VENOUS BLD VENIPUNCTURE: CPT

## 2022-07-19 PROCEDURE — 85025 COMPLETE CBC W/AUTO DIFF WBC: CPT

## 2022-07-19 RX ORDER — GADOBUTROL 604.72 MG/ML
10 INJECTION INTRAVENOUS ONCE
Status: DISCONTINUED | OUTPATIENT
Start: 2022-07-19 | End: 2022-07-19

## 2022-07-19 RX ORDER — GADOBUTROL 604.72 MG/ML
8 INJECTION INTRAVENOUS ONCE
Status: COMPLETED | OUTPATIENT
Start: 2022-07-19 | End: 2022-07-19

## 2022-07-19 RX ADMIN — GADOBUTROL 8 ML: 604.72 INJECTION INTRAVENOUS at 14:15

## 2022-07-19 ASSESSMENT — PAIN SCALES - GENERAL
PAINLEVEL: NO PAIN (0)
PAINLEVEL: NO PAIN (0)

## 2022-07-19 NOTE — PROGRESS NOTES
"Oncology Rooming Note    July 19, 2022 2:28 PM   Olga Bailey is a 76 year old female who presents for:    Chief Complaint   Patient presents with     Oncology Clinic Visit     GBM (glioblastoma multiforme) (H)         Initial Vitals: BP (!) 147/89 (BP Location: Right arm, Patient Position: Sitting, Cuff Size: Adult Regular)   Pulse 77   Temp 98.1  F (36.7  C) (Oral)   SpO2 92%  Estimated body mass index is 20.53 kg/m  as calculated from the following:    Height as of 6/3/22: 1.753 m (5' 9\").    Weight as of 6/3/22: 63 kg (139 lb). There is no height or weight on file to calculate BSA.  No Pain (0) Comment: Data Unavailable   No LMP recorded. Patient has had a hysterectomy.  Allergies reviewed: Yes  Medications reviewed: Yes    Medications: Medication refills not needed today.  Pharmacy name entered into EPIC: Data Unavailable    Clinical concerns: no      Paula Turner CMA              "

## 2022-07-19 NOTE — LETTER
"    7/19/2022         RE: Olga Bailey  [id number] [facility] [id number]        Dear Colleague,    Thank you for referring your patient, Olga Bailey, to the [facility]. Please see a copy of my visit note below.    ERRONEOUS ENCOUNTER    Oncology Rooming Note    July 19, 2022 2:37 PM   Olga Bailey is a 76 year old female who presents for:    Chief Complaint   Patient presents with     Oncology Clinic Visit     Initial Vitals: /86   Pulse 77   Temp 98.1  F (36.7  C) (Oral)   Resp 18  Estimated body mass index is 20.53 kg/m  as calculated from the following:    Height as of 6/3/22: 1.753 m (5' 9\").    Weight as of 6/3/22: 63 kg (139 lb). There is no height or weight on file to calculate BSA.  No Pain (0) Comment: Data Unavailable   No LMP recorded. Patient has had a hysterectomy.  Allergies reviewed: Yes  Medications reviewed: Yes    Medications: Medication refills not needed today.  Pharmacy name entered into EPIC: Data Unavailable    Clinical concerns: no      Kelli Hannon                Again, thank you for allowing me to participate in the care of your patient.        Sincerely,        Yesenia Agudelo MD    "

## 2022-07-19 NOTE — PROGRESS NOTES
"NEURO-ONCOLOGY VISIT  Jul 19, 2022    CHIEF COMPLAINT: Ms. Olga Bailey is a 76 year old right-handed woman with a left frontoparietal glioblastoma (IDH wild-type, MGMT promoter methylated), diagnosed following gross total resection on 2/23/2021. Initiation of upfront cancer-directed treatment was delayed due to a complicated hospitalization. Given a resolving infection and lowered functional status, radiation alone was initiated on 5/4/2021 and completed on 5/27/2021.     Olga started adjuvant therapy with 150mg/m2 dosing of temozolomide, however, cycle 1 was complicated by significant hematologic toxicity. Dosing was changed to metronomic/ daily dosing in 7/2021 and this was well tolerated. Imaging from 8/2021 and 11/2021 demonstrates positive treatment effect.    Chemotherapy was placed on a hold in 10/2021 in the setting of severe constipation, but then, resumed. She has since completed the planned 6 months of adjuvant temozolomide as of 12/2021.    Imaging since 2/2022 has been without concern for cancer recurrence, however, imaging in 7/2022 showed a punctate area of contrast enhancement of indeterminate significance. The plan remains to continue on imaging surveillance.     Olga is presenting in follow-up accompanied by La Nena (daughter) and Fahad (father).    HISTORY OF PRESENT ILLNESS  -Olga is fairly well today. She was admitted in April. Both Olga and La Nena have noted much improvement since hospital discharge;    Using right arm better.   On a regular diet.    Walking with some assist with PT.    Cognition is nearing baseline. Ongoing issues with word finding difficulty and concentration. Olga feels that she \"fades in and out\" of her thinking/ train of thought.   -Appetite is low.   -Sleep is good. Energy is fairly good during the day; less naps in the afternoon.    -No issues with constipation.  -Mood is \"better\". More of a sense of humor now per Fahad. Off Remeron and " Zyprexa.   -No headaches recently.  -Off steroids.   -No recurrent seizures since her last visit, tolerating Keppra.  -On anticoagulation with no significant signs/ symptoms of bleeding. Had a bloody nose awhile ago. Some bruising.        MEDICATIONS   Current Outpatient Medications   Medication     acetaminophen (TYLENOL) 500 MG tablet     albuterol (PROAIR HFA/PROVENTIL HFA/VENTOLIN HFA) 108 (90 Base) MCG/ACT inhaler     albuterol (PROVENTIL) (2.5 MG/3ML) 0.083% neb solution     amLODIPine (NORVASC) 5 MG tablet     busPIRone HCl (BUSPAR) 30 MG tablet     docosanol (ABREVA) 10 % CREA cream     FLUoxetine (PROZAC) 20 MG capsule     ketoconazole (NIZORAL) 2 % external shampoo     levETIRAcetam (KEPPRA) 1000 MG tablet     levETIRAcetam (KEPPRA) 250 MG tablet     loperamide (IMODIUM) 2 MG capsule     melatonin 1 MG TABS tablet     methylphenidate (RITALIN) 5 MG tablet     mirabegron (MYRBETRIQ) 25 MG 24 hr tablet     multivitamin, therapeutic (THERA-VIT) TABS tablet     Nutritional Supplements (ENSURE CLEAR) LIQD     ondansetron (ZOFRAN) 4 MG tablet     pantoprazole (PROTONIX) 40 MG EC tablet     rivaroxaban ANTICOAGULANT (XARELTO ANTICOAGULANT) 20 MG TABS tablet     No current facility-administered medications for this visit.     Current Outpatient Medications   Medication Sig Dispense Refill     acetaminophen (TYLENOL) 500 MG tablet Take 500 mg by mouth every 4 hours as needed for mild pain        albuterol (PROAIR HFA/PROVENTIL HFA/VENTOLIN HFA) 108 (90 Base) MCG/ACT inhaler Inhale 2 puffs into the lungs every 4 hours as needed for wheezing 18 g 3     albuterol (PROVENTIL) (2.5 MG/3ML) 0.083% neb solution Take 1 vial (2.5 mg) by nebulization every 4 hours as needed for wheezing or shortness of breath / dyspnea       amLODIPine (NORVASC) 5 MG tablet Take 1 tablet (5 mg) by mouth daily (Patient taking differently: Take 5 mg by mouth daily Hold if SBP >160 or DBP <90)       busPIRone HCl (BUSPAR) 30 MG tablet Take 30  mg by mouth 2 times daily       docosanol (ABREVA) 10 % CREA cream Apply topically 5 times daily Apply to cold sore on lip       FLUoxetine (PROZAC) 20 MG capsule Take 2 capsules (40 mg) by mouth daily 30 capsule 0     ketoconazole (NIZORAL) 2 % external shampoo Apply topically twice a week On Wed & Sun       levETIRAcetam (KEPPRA) 1000 MG tablet Take 1,000 mg by mouth 2 times daily Give with 250mg tab = 1250mg total dose twice daily       levETIRAcetam (KEPPRA) 250 MG tablet Take 250 mg by mouth 2 times daily Give with 100mg tab = 1250mg       loperamide (IMODIUM) 2 MG capsule TAKE 2 CAPSULES (4MG) BY MOUTH AFTER FIRST LOOSE STOOL, THEN;TAKE 1 CAPSULE AFTER CONSECUTIVE STOOLS, MAX 4 CAPS/24H       melatonin 1 MG TABS tablet Take 1 tablet (1 mg) by mouth nightly as needed for sleep       methylphenidate (RITALIN) 5 MG tablet Take 1 tablet (5 mg) by mouth daily Start taking 1/2 tablet daily in the morning for 7 days, then increase to 1 full tablet daily. 30 tablet 0     mirabegron (MYRBETRIQ) 25 MG 24 hr tablet Take 25 mg by mouth daily       multivitamin, therapeutic (THERA-VIT) TABS tablet Take 1 tablet by mouth daily       Nutritional Supplements (ENSURE CLEAR) LIQD TAKE ONE CAN BY MOUTH TWICE DAILY IN BETWEEN MEALS FOR WEIGHT LOSS       ondansetron (ZOFRAN) 4 MG tablet Take 1 tablet (4 mg) by mouth every 8 hours as needed for nausea 30 tablet 3     pantoprazole (PROTONIX) 40 MG EC tablet Take 1 tablet (40 mg) by mouth 2 times daily (before meals) 30 tablet 0     rivaroxaban ANTICOAGULANT (XARELTO ANTICOAGULANT) 20 MG TABS tablet Take 1 tablet (20 mg) by mouth daily (with dinner) 30 tablet 3     DRUG ALLERGIES   Allergies   Allergen Reactions     Pilocarpine Headache and Nausea and Vomiting     Chlorhexidine Itching and Rash     Aripiprazole Headache and Other (See Comments)     Insomnia, nightmares     Bacitracin Rash     Bactroban is effective; No difficulties     Erythromycin      intolerant.     Meperidine Hcl       intolerant only   Demerol     Chloraprep One Step Rash       IMMUNIZATIONS   Immunization History   Administered Date(s) Administered     COVID-19,PF,Moderna 03/18/2021, 07/22/2021, 01/18/2022     Flu 65+ Years 09/28/2020     HepB 01/01/1990     Influenza (IIV3) PF 01/01/2001     Influenza Vaccine IM > 6 months Valent IIV4 (Alfuria,Fluzone) 09/28/2020     Influenza, Quad, High Dose, Pf, 65yr+ (Fluzone HD) 09/28/2020     Pneumo Conj 13-V (2010&after) 10/29/2014     Pneumococcal 23 valent 07/22/2020     TDAP Vaccine (Boostrix) 08/22/2016     Tetanus 06/13/2004     Zoster vaccine recombinant adjuvanted (SHINGRIX) 12/24/2019, 10/12/2020     Zoster vaccine, live 12/18/2008       ONCOLOGIC HISTORY  -2/2021 PRESENTATION: Progressive aphasia.   -2/19/2021 MR brain imaging with 3.3 x 2.8 x 2.8 cm enhancing mass in left frontal-parietal region. A second contrast enhancing lesion (0.5 x 1.0 x 1.0 cm) in right occipital lobe which is dural based and largely stable in size since 9/2011; likely representing a meningioma.  -2/23/2021 SURGERY: Gross total resection by Dr. Cummings  PATHOLOGY: Glioblastoma; IDH1-R132H wild-type/ IDH 1 and 2 wildtype, MGMT promoter methylated. Not BRAF mutated.   -3/23/2021 NEURO-ONC: Recommending chemoradiotherapy.   -3/2021 ADMISSION: SOB and chest pain; CT PA negative, but bilateral DVT noted on U/S. Started on Lovenox. Acute hypoxic respiratory failure in the setting of bilateral pneumonia; presumed PJP, concern for transfusion-related acute lung injury and right hemidiaphragm paralysis. Seizure. Right retroperitoneal hematoma. Ileus. Non-severe malnutrition on TPN with concern for refeeding syndrome. 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 Shock Liver.  -5/4 - 5/27/2021 RADS: 15 fractions.   -5/25/2021 NEURO-ONC/ DEVICE: Discussed the role of Optune; to be considered. Repeat MRI in 4 weeks with plans to start adjuvant temozolomide.   -6/22/2021 NEURO-ONC/ MRB/ CHEMO: Clinically improving.  Imaging largely stable. Starting adjuvant temozolomide 150mg/m2 (250mg), cycle 1 (start date 6/24).   -7/20/2021 NEURO-ONC/ CHEMO: Clinically improving. Thrombocytopenia noted with adjuvant temozolomide dosing, platelets of 26K; will transition to metronomic dosing 50mg/m2 (100mg) daily to start once platelets are > 80K.    -8/17/2021 NEURO-ONC/ MRB/ CHEMO: Clinically improving. Saw Dr. Gamboa, who recommended starting anticoagulation; on Eliquis. Imaging with positive treatment response. Continue metronomic/ daily dosing at 50mg/m2 (100mg) through 12/2021.    -9/14/2021 NEURO-ONC/ CHEMO: Clinically improving. Continue metronomic/ daily dosing at 50mg/m2 (100mg) through 12/2021.    -10/12/2021 NEURO-ONC/ CHEMO: Clinically improving. Holding temozolomide and Zofran in the setting of severe constipation. Abdominal x-ray with high stool burden; consulted Dr. Hodge for management of constipation given history of ileus.   -10/28/2021 CHEMO: Temozolomide restarted.   -11/16/2021 NEURO-ONC/ MRB/ CHEMO: Clinically improving Less constipation, continued low appetite; referral to palliative care for mental health management. Referral to Dr. Agudelo to optimize rehab. Imaging with continued positive treatment response. Continue daily temozolomide.   -12/21/2021 NEURO-ONC/ CHEMO: Clinically improving in terms of appetite, energy. Stopping daily temozolomide at the end of the month. Discussion with Dr. Gamboa regarding removal of IVC filter.   -2/14/2022 MRB with a stable postoperative changes of left parietal craniotomy and tumor resection.   -3/1/2022 NEURO-ONC: Clinically deconditioned, mood is depressed; following with Cancer PM&R and Palliative Care. With imaging stable, continue imaging surveillance.   -4/4/2022 MRB with stable postoperative change of left-sided craniotomy and tumor resection.  -7/19/2022 NEURO-ONC/ MRB: Clinically improving. Imaging with no overt evidence of cancer recurrence, new punctate area of contrast  "enhancement of indeterminate significance; continue imaging surveillance.     SOCIAL HISTORY   History   Smoking Status     Never Smoker   Smokeless Tobacco     Never Used    Social History    Substance and Sexual Activity      Alcohol use: Yes        Comment: very rare     History   Drug Use No       PHYSICAL EXAMINATION  /86 (BP Location: Left arm, Patient Position: Sitting, Cuff Size: Adult Regular)   Pulse 77   Temp 98.1  F (36.7  C) (Oral)   SpO2 92%   Wt Readings from Last 2 Encounters:   06/03/22 63 kg (139 lb)   04/18/22 65.6 kg (144 lb 11.2 oz)      Ht Readings from Last 2 Encounters:   06/03/22 1.753 m (5' 9\")   04/05/22 1.6 m (5' 3\")     KPS: 60    -Good energy today. Engaged in the conversation.   -Legs: Mild swelling.   -Psychiatric: Good mood, reactive affect. Pleasant, talkative.  -Neurologic:   MENTAL STATUS:     Alert, oriented.     Speech fluent. Did loose train of thought at times.   Comprehension with some confusion.      CRANIAL NERVES:     Pupils are equal, round.     Extraocular movements full, denies diplopia.     Facial sensation intact to light touch.   No facial droop.   Hearing slightly decreased bilaterally.   Normal phonation.   MOTOR: No pronation or drift.    SENSATION: Intact to light touch throughout.  GAIT: Sitting in a wheelchair, did not assess today.        MEDICAL RECORDS  Obtained and personally reviewed all available outside medical records in addition to reviewing any records available in our electronic system.   Hospitalization records.     LABS  Personally reviewed all available lab results; CBC and CMP.     IMAGING  Personally reviewed MR brain imaging from today and compared to prior imaging. To my eye, there is no overt evidence of cancer recurrence about the left parietal resection cavity.    There is a new punctate focus of enhancement within the brain parenchyma deep and posterior to the resection cavity may reflect treatment-related effect, but is too small " to further characterize (below).        Unchanged dural-based mass along the right occipital lobe, presumably meningioma.     Imaging was shown to and results were reviewed with Olga and her family.       4/16/2022 MR cervical spine;  1.  Severe spinal stenosis at C6-C7 with severe left foraminal stenosis at this level.  2.  Moderate to severe bilateral foraminal stenosis at C5-C6 and C4-C5.  3.  Severe left foraminal stenosis at C3-C4.    4/4/2022 CT c/a/p;  1.  Mild nonspecific stranding around the lower poles of the kidneys. Correlate clinically for pyelonephritis.  2.  Resolved large right retroperitoneal hematoma.  3.  Stable mild central intrahepatic and extrahepatic biliary ductal dilatation. No calcified gallstones or obstructing masses.    4/2022 Swallow Evaluation;  1. Deep penetration without aspiration is noted with thin barium. No penetration or aspiration is noted with other consistencies of barium. 2. Please refer to the speech pathology report for further details.      IMPRESSION  Clinic time for this high complexity encounter was spent discussing in detail the nature of her cancer in light of her recent imaging. This was in addition to answering questions pertaining to my recommendations and devising the plan as outlined below.     Olga was admitted to [facility] on 4/4/22 for evaluation for generalized weakness. On presentation, she was hypoxic with oxygen saturation at 88%. Chest x-ray showed mild alveolar infiltrate in the left midlung. She was treated with Zosyn to cover aspiration pneumonia. A CT of chest was done on 4/8/22 and showed increased extensive linear, groundglass and patchy opacities in both lungs- likely due to atypical pneumonia. Pulmonary medicine was consulted. She continued to have intermittent fevers and weakness, thus IV Vancomycin was added and empiric prednisone was started plus Bactrim. With treatment, the fevers resolved, strength and mental status  improved. Olga was weaned off of supplemental oxygen and the steroids. I have reviewed her post-discharge primary care notes and also, the clinic note from pulmonary medicine that stated that her lung function was recovering following this bout of pneumonia.     Today, Olga's functional status continues to improve. She received much benefit for her stay at rehab and has an appointment with Dr. Agudelo today. With excellent ongoing management of medications, ongoing access to rehab therapies, and management of medical issues, I would anticipate continued improvement in her cognitive and physical status. Olga continues to follow with Dr. Plaza in palliative care to address mood/ fatigue.    This impression of her prognosis is also based on her recent brain imaging that is again without any overt evidence of cancer recurrence. However, there is a new punctate area of contrast enhancement of indeterminate significance. It may represent anticipated radiation-induced injury or a subacute punctate stroke. As a result, will continue to monitor cautiously with imaging surveillance. I called La Nena following this appointment today to provider her with the results of the MRI.     PROBLEM LIST  Glioblastoma  Aphasia  Apraxia    PLAN  -CANCER DIRECTED THERAPY-  -Repeat imaging in 2-3 months.  -Will repeat labs in 2-3 months.     -STEROIDS-  -Off dexamethasone.    -SEIZURE MANAGEMENT-  -Given history of seizures, antiepileptics are indicated.   -Continue Keppra.     -DVT-  -Following with Dr. Gamboa; currently on Eliquis.   -Requires lifelong anticoagulation given cancer diagnosis.   -Holding on removal of the IVC filter at this time.     -Quality of life/ MOOD/ FATIGUE-  -History of depressed mood, PTSD, poor motivation, and poor appetite.   -Following with palliative care to assess mental health and manage medications.  -Cannot tolerate CPAP for MARCELLE.    -REHAB NEEDS-  -PT/ OT.   -Following with Dr. Agudelo in  cancer rehab.     Return to clinic in 2-3 months + labs + imaging.     In the meantime, Olga and La Nena know to call with questions or concerns or to report new complaints and can be seen sooner if needed.     Stephanie Baker MD  Neuro-oncology

## 2022-07-19 NOTE — PROGRESS NOTES
"Oncology Rooming Note    July 19, 2022 2:37 PM   Olga Bailey is a 76 year old female who presents for:    Chief Complaint   Patient presents with     Oncology Clinic Visit     Initial Vitals: /86   Pulse 77   Temp 98.1  F (36.7  C) (Oral)   Resp 18  Estimated body mass index is 20.53 kg/m  as calculated from the following:    Height as of 6/3/22: 1.753 m (5' 9\").    Weight as of 6/3/22: 63 kg (139 lb). There is no height or weight on file to calculate BSA.  No Pain (0) Comment: Data Unavailable   No LMP recorded. Patient has had a hysterectomy.  Allergies reviewed: Yes  Medications reviewed: Yes    Medications: Medication refills not needed today.  Pharmacy name entered into EPIC: Data Unavailable    Clinical concerns: no      Kelli Hannon            "

## 2022-07-20 ENCOUNTER — TELEPHONE (OUTPATIENT)
Dept: ONCOLOGY | Facility: CLINIC | Age: 77
End: 2022-07-20

## 2022-07-20 NOTE — TELEPHONE ENCOUNTER
----- Message from Yesenia Agudelo MD sent at 7/19/2022  3:39 PM CDT -----  Regarding: coordination request to continue home PT and OT  Brijesh Amaro,  Could you please reach out to this patient's home care agency and see if we can extend home PT and OT? I can place a new home health order if needed.  The patient was late today and Dr. Baker saw her during my time, so I couldn't get to her as I had other patient's right after her. I will need to reschedule her as well. If you could pass this on to the scheduling team, it would be great.  Thanks much,  Yesenia

## 2022-07-20 NOTE — TELEPHONE ENCOUNTER
Writer received the below message from Dr. Agudelo and and spoke with patient's daughter, La Nena, and was updated the Olga is at [facility] in [place], and is requesting additional therapy to gain strength.    Writer will call [facility] and follow up with daughter with outcome of obtaining therapies.     Elizabeth Ricci RN

## 2022-07-21 NOTE — TELEPHONE ENCOUNTER
Writer called and spoke with [facility] [phone] and LVM with  requesting a return call to learn more about PT options.     Will wait for a return call.    Elizabeth Ricci RN

## 2022-07-22 NOTE — TELEPHONE ENCOUNTER
Writer called again to [facility] and was transferred to TEVIN Amaya [phone] head of PT  And LVM requesting a return call regarding PT for patient.    Rianar requested a return call.    Elizabeth Ricci RN

## 2022-07-22 NOTE — TELEPHONE ENCOUNTER
Rianar received a return call from Heather  with [facility], and was informed that patient is bed bound non ambulatory using a ceiling lift for transferring in and out of bed. She is on maintenance program which is for ROM.     Rianar will wait for PT Alfie Amaya to return call.    Elizabeth Ricci RN

## 2022-07-27 ENCOUNTER — LAB REQUISITION (OUTPATIENT)
Dept: LAB | Facility: CLINIC | Age: 77
End: 2022-07-27
Payer: MEDICARE

## 2022-07-27 DIAGNOSIS — T45.1X5A ADVERSE EFFECT OF ANTINEOPLASTIC AND IMMUNOSUPPRESSIVE DRUGS, INITIAL ENCOUNTER: ICD-10-CM

## 2022-07-27 DIAGNOSIS — R11.2 NAUSEA WITH VOMITING, UNSPECIFIED: ICD-10-CM

## 2022-09-12 NOTE — PLAN OF CARE
"Discharge Planner Post-Acute Rehab OT:      Discharge Plan: discharge to dtr's home, pt will need to be home alone intermittently, home health and home OT     Precautions: weakness RUE, poor midline sitting unsupported, falls     Current Status:  ADLs: Pt requires set-up for gr/hyg and oral cares, min A UB dressing supported seated in bed, min A LB dressing, mod A supine to sit w/ HOB raised and use of side rail, CGA x2 STS w/fww on 5/21, Max A bathing in rolling shower chair, pt able to participate in hair washing, UB sponge bathing, and cleansing brad area  IADLs: Pt had been IND with IADLs PTA.  Vision/Cognition: Pt wears glasses and notes a change in her cognition.  She is partially oriented and follows directions. On 5/24 OT: impaired memory, impaired attn/concentration and decreased problem sovling w/ limited awareness of deficits when questioned about things that are difficult,. increased awareness at end of session after pt /th discussed performance and pt's own observation of \"this is harder than I thought\" , sorted money by type of coin w/ min A for sorting and attn, indep stated value of each coin w/ 100% accur but max assist to do simple counting total value of multiple coins in a pile.      Assessment:   Focus: ADLs. Th provided set-up and min A to string pants/socks over feet while reclined.  Max A to pull pants up in standing as pt either needed assist with her balance or assist with the pants.  Min A supine to sit with the HOB raised and min A w/ STS x4.  Min A bed to w/c with 2WW and use of grab bars for toilet use (handoff given to nsg re: toilet use).  UB cares - pt able to perform seated at the sink after set-up.  Pt reports bending forward to reach feet for dressing while seated - max A, she did better crossing LE's while seated in w/c.  Toilet transfer min A with grab bar.  Dependent clothing adjustment and hygiene.  Pt participated well and con't to be motivated.  Con't OT POC to challenge " pt's strength and balance for improving ADLs and mobility.      Other Barriers to Discharge (DME, Family Training, etc): Family training, consider AE for bathroom. Pt will be on the main floor at dtr's home for kitching/living/bathroom but her bed is upstairs.  Dtr has tub/shower upstairs, toilet on the main floor and walk in shower on the lower level.  Pt is used to a high toilet at her own house and a walk in shower with grab bars.   Imiquimod Counseling:  I discussed with the patient the risks of imiquimod including but not limited to erythema, scaling, itching, weeping, crusting, and pain.  Patient understands that the inflammatory response to imiquimod is variable from person to person and was educated regarded proper titration schedule.  If flu-like symptoms develop, patient knows to discontinue the medication and contact us.

## 2022-09-30 NOTE — PROGRESS NOTES
NEURO-ONCOLOGY VISIT  Oct 4, 2022    CHIEF COMPLAINT: Ms. Olga Bailey is a 77 year old right-handed woman with a left frontoparietal glioblastoma (IDH wild-type, MGMT promoter methylated), diagnosed following gross total resection on 2/23/2021. Initiation of upfront cancer-directed treatment was delayed due to a complicated hospitalization. Given a resolving infection and lowered functional status, radiation alone was initiated on 5/4/2021 and completed on 5/27/2021.     Olga started adjuvant therapy with 150mg/m2 dosing of temozolomide, however, cycle 1 was complicated by significant hematologic toxicity. Dosing was changed to metronomic/ daily dosing in 7/2021 and this was well tolerated. Imaging from 8/2021 and 11/2021 demonstrates positive treatment effect.    Chemotherapy was placed on a hold in 10/2021 in the setting of severe constipation, but then, resumed. She has since completed the planned 6 months of adjuvant temozolomide as of 12/2021.    Imaging since 2/2022 has been without concern for cancer recurrence, however, imaging in 7/2022 showed a punctate area of contrast enhancement of indeterminate significance. Repeat imaging in 10/2022 showed near resolution of that punctate lesion and no evidence of cancer recurrence. The plan remains to continue on imaging surveillance.     Olga is presenting in follow-up accompanied by La Nena (daughter) and Fahad ().    HISTORY OF PRESENT ILLNESS  -Olga is fairly well today. La Nena and Fahad agree. They believe she is making progress of late, specifically noting improvement in cognition but not as much in fine motor skills. Right arm use stable. She walked 200 ft with PT last week with walker and able to beat her  in checkers. She is now brushing her own teeth.   -They note worsening of the bilateral hand tremor and mouth posturing and wonder about Parkinson's or tardive dyskinesia. Medications were thoroughly reviewed.   -They are  "understandably frustrated with the fact that therapies at the assisted living facility are using a \"maintenance\" approach.   -Sleep is good. Energy is fairly good during the day; less naps in the afternoon. Taking Ritalin 5 mg as needed.   -No recurrent constipation.   -Mood is improved, remains off Remeron and Zyprexa.   -No headaches.  -Off steroids.   -No recurrent seizures since her last visit, tolerating Keppra.  -On anticoagulation with no significant signs/ symptoms of bleeding. They inquire about the use of melatonin with anticoagulants.       MEDICATIONS   Current Outpatient Medications   Medication     acetaminophen (TYLENOL) 500 MG tablet     albuterol (PROAIR HFA/PROVENTIL HFA/VENTOLIN HFA) 108 (90 Base) MCG/ACT inhaler     albuterol (PROVENTIL) (2.5 MG/3ML) 0.083% neb solution     amLODIPine (NORVASC) 5 MG tablet     busPIRone HCl (BUSPAR) 30 MG tablet     docosanol (ABREVA) 10 % CREA cream     FLUoxetine (PROZAC) 20 MG capsule     ketoconazole (NIZORAL) 2 % external shampoo     levETIRAcetam (KEPPRA) 1000 MG tablet     levETIRAcetam (KEPPRA) 250 MG tablet     loperamide (IMODIUM) 2 MG capsule     melatonin 1 MG TABS tablet     multivitamin, therapeutic (THERA-VIT) TABS tablet     ondansetron (ZOFRAN) 4 MG tablet     pantoprazole (PROTONIX) 40 MG EC tablet     rivaroxaban ANTICOAGULANT (XARELTO ANTICOAGULANT) 20 MG TABS tablet     No current facility-administered medications for this visit.     Current Outpatient Medications   Medication Sig Dispense Refill     acetaminophen (TYLENOL) 500 MG tablet Take 500 mg by mouth every 4 hours as needed for mild pain        albuterol (PROAIR HFA/PROVENTIL HFA/VENTOLIN HFA) 108 (90 Base) MCG/ACT inhaler Inhale 2 puffs into the lungs every 4 hours as needed for wheezing 18 g 3     albuterol (PROVENTIL) (2.5 MG/3ML) 0.083% neb solution Take 1 vial (2.5 mg) by nebulization every 4 hours as needed for wheezing or shortness of breath / dyspnea       amLODIPine (NORVASC) " 5 MG tablet Take 1 tablet (5 mg) by mouth daily (Patient taking differently: Take 5 mg by mouth daily Hold if SBP >160 or DBP <90)       busPIRone HCl (BUSPAR) 30 MG tablet Take 30 mg by mouth 2 times daily       docosanol (ABREVA) 10 % CREA cream Apply topically 5 times daily Apply to cold sore on lip       FLUoxetine (PROZAC) 20 MG capsule Take 2 capsules (40 mg) by mouth daily 30 capsule 0     ketoconazole (NIZORAL) 2 % external shampoo Apply topically twice a week On Wed & Sun       levETIRAcetam (KEPPRA) 1000 MG tablet Take 1,000 mg by mouth 2 times daily Give with 250mg tab = 1250mg total dose twice daily       levETIRAcetam (KEPPRA) 250 MG tablet Take 250 mg by mouth 2 times daily Give with 100mg tab = 1250mg       loperamide (IMODIUM) 2 MG capsule TAKE 2 CAPSULES (4MG) BY MOUTH AFTER FIRST LOOSE STOOL, THEN;TAKE 1 CAPSULE AFTER CONSECUTIVE STOOLS, MAX 4 CAPS/24H       melatonin 1 MG TABS tablet Take 1 tablet (1 mg) by mouth nightly as needed for sleep       multivitamin, therapeutic (THERA-VIT) TABS tablet Take 1 tablet by mouth daily       ondansetron (ZOFRAN) 4 MG tablet Take 1 tablet (4 mg) by mouth every 8 hours as needed for nausea 30 tablet 3     pantoprazole (PROTONIX) 40 MG EC tablet Take 1 tablet (40 mg) by mouth 2 times daily (before meals) 30 tablet 0     rivaroxaban ANTICOAGULANT (XARELTO ANTICOAGULANT) 20 MG TABS tablet Take 1 tablet (20 mg) by mouth daily (with dinner) 30 tablet 3     DRUG ALLERGIES   Allergies   Allergen Reactions     Pilocarpine Headache and Nausea and Vomiting     Chlorhexidine Itching and Rash     Aripiprazole Headache and Other (See Comments)     Insomnia, nightmares     Bacitracin Rash     Bactroban is effective; No difficulties     Erythromycin      intolerant.     Meperidine Hcl      intolerant only   Demerol     Chloraprep One Step Rash       IMMUNIZATIONS   Immunization History   Administered Date(s) Administered     COVID-19,PF,Moderna 03/18/2021, 07/22/2021,  01/18/2022     Flu 65+ Years 09/28/2020     HepB 01/01/1990     Influenza (IIV3) PF 01/01/2001     Influenza Vaccine IM > 6 months Valent IIV4 (Alfuria,Fluzone) 09/28/2020     Influenza, Quad, High Dose, Pf, 65yr+ (Fluzone HD) 09/28/2020     Pneumo Conj 13-V (2010&after) 10/29/2014     Pneumococcal 23 valent 07/22/2020     TDAP Vaccine (Boostrix) 08/22/2016     Tetanus 06/13/2004     Zoster vaccine recombinant adjuvanted (SHINGRIX) 12/24/2019, 10/12/2020     Zoster vaccine, live 12/18/2008       ONCOLOGIC HISTORY  -2/2021 PRESENTATION: Progressive aphasia.   -2/19/2021 MR brain imaging with 3.3 x 2.8 x 2.8 cm enhancing mass in left frontal-parietal region. A second contrast enhancing lesion (0.5 x 1.0 x 1.0 cm) in right occipital lobe which is dural based and largely stable in size since 9/2011; likely representing a meningioma.  -2/23/2021 SURGERY: Gross total resection by Dr. Cummings  PATHOLOGY: Glioblastoma; IDH1-R132H wild-type/ IDH 1 and 2 wildtype, MGMT promoter methylated. Not BRAF mutated.   -3/23/2021 NEURO-ONC: Recommending chemoradiotherapy.   -3/2021 ADMISSION: SOB and chest pain; CT PA negative, but bilateral DVT noted on U/S. Started on Lovenox. Acute hypoxic respiratory failure in the setting of bilateral pneumonia; presumed PJP, concern for transfusion-related acute lung injury and right hemidiaphragm paralysis. Seizure. Right retroperitoneal hematoma. Ileus. Non-severe malnutrition on TPN with concern for refeeding syndrome. 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 Shock Liver.  -5/4 - 5/27/2021 RADS: 15 fractions.   -5/25/2021 NEURO-ONC/ DEVICE: Discussed the role of Optune; to be considered. Repeat MRI in 4 weeks with plans to start adjuvant temozolomide.   -6/22/2021 NEURO-ONC/ MRB/ CHEMO: Clinically improving. Imaging largely stable. Starting adjuvant temozolomide 150mg/m2 (250mg), cycle 1 (start date 6/24).   -7/20/2021 NEURO-ONC/ CHEMO: Clinically improving. Thrombocytopenia noted with adjuvant  temozolomide dosing, platelets of 26K; will transition to metronomic dosing 50mg/m2 (100mg) daily to start once platelets are > 80K.    -8/17/2021 NEURO-ONC/ MRB/ CHEMO: Clinically improving. Saw Dr. Gamboa, who recommended starting anticoagulation; on Eliquis. Imaging with positive treatment response. Continue metronomic/ daily dosing at 50mg/m2 (100mg) through 12/2021.    -9/14/2021 NEURO-ONC/ CHEMO: Clinically improving. Continue metronomic/ daily dosing at 50mg/m2 (100mg) through 12/2021.    -10/12/2021 NEURO-ONC/ CHEMO: Clinically improving. Holding temozolomide and Zofran in the setting of severe constipation. Abdominal x-ray with high stool burden; consulted Dr. Hodge for management of constipation given history of ileus.   -10/28/2021 CHEMO: Temozolomide restarted.   -11/16/2021 NEURO-ONC/ MRB/ CHEMO: Clinically improving Less constipation, continued low appetite; referral to palliative care for mental health management. Referral to Dr. Agudelo to optimize rehab. Imaging with continued positive treatment response. Continue daily temozolomide.   -12/21/2021 NEURO-ONC/ CHEMO: Clinically improving in terms of appetite, energy. Stopping daily temozolomide at the end of the month. Discussion with Dr. Gamboa regarding removal of IVC filter.   -2/14/2022 MRB with a stable postoperative changes of left parietal craniotomy and tumor resection.   -3/1/2022 NEURO-ONC: Clinically deconditioned, mood is depressed; following with Cancer PM&R and Palliative Care. With imaging stable, continue imaging surveillance.   -4/4/2022 MRB with stable postoperative change of left-sided craniotomy and tumor resection.  -7/19/2022 NEURO-ONC/ MRB: Clinically improving. Imaging with no overt evidence of cancer recurrence, new punctate area of contrast enhancement of indeterminate significance; continue imaging surveillance.   -10/4/2022 NEURO-ONC/ MRB: Clinically with improved cognition. Recommend stopping ritalin in light of no known  "benefit and worsening tremor. Reestablish care with Dr. Agudelo. Imaging with no overt evidence of cancer recurrence, the punctate area of contrast enhancement nearly resolved; continue imaging surveillance.       PHYSICAL EXAMINATION  /89   Pulse 68   Temp 99.5  F (37.5  C) (Oral)   Resp 16   SpO2 97%   Wt Readings from Last 2 Encounters:   06/03/22 63 kg (139 lb)   04/18/22 65.6 kg (144 lb 11.2 oz)      Ht Readings from Last 2 Encounters:   06/03/22 1.753 m (5' 9\")   04/05/22 1.6 m (5' 3\")     KPS: 50-60  -Good energy today. Engaged in the conversation though only circumferentially for complicated conversation.   -Legs: Mild swelling.   -Psychiatric: Good mood, reactive affect. Pleasant, talkative.  -Neurologic:   MENTAL STATUS:     Alert, oriented. Not able to say where she lives.    Speech fluent. Did loose train of thought at times.   Comprehension with some confusion.      CRANIAL NERVES:     No masked facies.    Pupils are equal, round.     Extraocular movements full, denies diplopia.     Facial sensation intact to light touch.   No facial droop.   Hearing slightly decreased bilaterally.   Normal phonation.   MOTOR: Right arm not able to counteract gravity due to prior injury. Mild bilateral hand tremor with positioning. No rigidity/cogwheeling.   SENSATION: Intact to light touch throughout.  GAIT: Sitting in a wheelchair, did not assess today.        MEDICAL RECORDS  Obtained and personally reviewed all available outside medical records in addition to reviewing any records available in our electronic system.   Hospitalization records.     LABS  Personally reviewed all available lab results; CBC and CMP.     IMAGING  Personally reviewed MR brain imaging from today and compared to prior imaging. To my eye, there is no overt evidence of cancer recurrence about the left parietal resection cavity. The punctate focus of enhancement within the brain parenchyma deep and posterior to the resection cavity has " nearly resolved (below). There is no new concerning lesions.         Unchanged dural-based mass along the right occipital lobe, presumably meningioma.     Imaging was shown to and results were reviewed with Olga and her family.       IMPRESSION  Clinic time for this high complexity encounter was spent discussing in detail the nature of her cancer in light of her recent imaging. This was in addition to answering questions pertaining to my recommendations and devising the plan as outlined below.     Today, Olga's functional status continues to improve. With excellent ongoing management of medications, ongoing access to aggressive rehab therapies, and management of medical issues, I would anticipate continued improvement in her cognitive and physical status. To further remove unneeded medication, I would recommend stopping ritalin in light of no known benefit and worsening tremor. To optimize rehab potential and over concern for plateaued therapy approach at assisted living facility, I recommended reestablishing care with Dr. Agudelo. On examination, I am not noting any clear clinical signs of Parkinson's Disease. Recommend a consultation with psychiatry/ palliative medicine to evaluate for tardive dyskinesia. She had multiple antipsychotic type medications discontinued or dose reduced in the last year by Dr. Plaza.     Imaging as detailed above with no overt evidence of cancer recurrence. In addition, the punctate area of contrast enhancement has nearly resolved. Neuro-radiology concurred with this impression. The plan is to continue imaging surveillance per NCCN guidelines of every 3 months. In the meantime, Olga, Fahad, and La Nena know to call with questions or concerns or to report new complaints and can be seen sooner if needed.     Labs from today reviewed; no concern on CMP. CBC with Hb of 10.5. No need for continued lab monitoring from my standpoint and will defer to primary care.      Finally, Fahad raised concern for medication interactions between her anticoagulant and melatonin. I have notify pharmacy to look into any known interactions.     PROBLEM LIST  Glioblastoma  Aphasia  Apraxia    PLAN  -CANCER DIRECTED THERAPY-  -Repeat imaging in 3 months.    -STEROIDS-  -Off dexamethasone.    -SEIZURE MANAGEMENT-  -Given history of seizures, antiepileptics are indicated.   -Continue Keppra.     -DVT-  -Following with Dr. Gamboa; currently on Eliquis.   -Requires lifelong anticoagulation given cancer diagnosis.   -Holding on removal of the IVC filter at this time.     -Quality of life/ MOOD/ FATIGUE-  -History of depressed mood, PTSD, poor motivation, and poor appetite.   -Previously followed with palliative care to assess mental health and manage medications.  -Cannot tolerate CPAP for MARCELLE.  -Discontinue Ritalin as above due to tremor.     -REHAB NEEDS-  -PT/ OT.   -Reconnect with Dr. Agudelo in cancer rehab for therapy direction.    Return to clinic in 3 months + imaging.     Patient was also seen and examined by TERA Cornelius.     Stephanie Baker MD  Neuro-oncology

## 2022-10-04 ENCOUNTER — HOSPITAL ENCOUNTER (OUTPATIENT)
Dept: MRI IMAGING | Facility: CLINIC | Age: 77
Discharge: HOME | End: 2022-10-04
Attending: PSYCHIATRY & NEUROLOGY
Payer: MEDICARE

## 2022-10-04 ENCOUNTER — ONCOLOGY VISIT (OUTPATIENT)
Dept: ONCOLOGY | Facility: CLINIC | Age: 77
End: 2022-10-04
Attending: PSYCHIATRY & NEUROLOGY
Payer: MEDICARE

## 2022-10-04 ENCOUNTER — LAB (OUTPATIENT)
Dept: LAB | Facility: CLINIC | Age: 77
End: 2022-10-04
Attending: PSYCHIATRY & NEUROLOGY
Payer: MEDICARE

## 2022-10-04 VITALS
OXYGEN SATURATION: 97 % | TEMPERATURE: 99.5 F | RESPIRATION RATE: 16 BRPM | HEART RATE: 68 BPM | DIASTOLIC BLOOD PRESSURE: 89 MMHG | SYSTOLIC BLOOD PRESSURE: 128 MMHG

## 2022-10-04 DIAGNOSIS — T45.1X5A CHEMOTHERAPY-INDUCED NAUSEA AND VOMITING: ICD-10-CM

## 2022-10-04 DIAGNOSIS — R11.2 CHEMOTHERAPY-INDUCED NAUSEA AND VOMITING: ICD-10-CM

## 2022-10-04 DIAGNOSIS — C71.9 GLIOBLASTOMA (H): ICD-10-CM

## 2022-10-04 DIAGNOSIS — C71.9 GLIOBLASTOMA (H): Primary | ICD-10-CM

## 2022-10-04 LAB
ALBUMIN SERPL-MCNC: 3.4 G/DL (ref 3.4–5)
ALP SERPL-CCNC: 87 U/L (ref 40–150)
ALT SERPL W P-5'-P-CCNC: 32 U/L (ref 0–50)
ANION GAP SERPL CALCULATED.3IONS-SCNC: 5 MMOL/L (ref 3–14)
AST SERPL W P-5'-P-CCNC: 19 U/L (ref 0–45)
BASOPHILS # BLD AUTO: 0 10E3/UL (ref 0–0.2)
BASOPHILS NFR BLD AUTO: 1 %
BILIRUB SERPL-MCNC: 0.3 MG/DL (ref 0.2–1.3)
BUN SERPL-MCNC: 16 MG/DL (ref 7–30)
CALCIUM SERPL-MCNC: 9.2 MG/DL (ref 8.5–10.1)
CHLORIDE BLD-SCNC: 106 MMOL/L (ref 94–109)
CO2 SERPL-SCNC: 30 MMOL/L (ref 20–32)
CREAT SERPL-MCNC: 0.87 MG/DL (ref 0.52–1.04)
EOSINOPHIL # BLD AUTO: 0.2 10E3/UL (ref 0–0.7)
EOSINOPHIL NFR BLD AUTO: 4 %
ERYTHROCYTE [DISTWIDTH] IN BLOOD BY AUTOMATED COUNT: 12.8 % (ref 10–15)
GFR SERPL CREATININE-BSD FRML MDRD: 68 ML/MIN/1.73M2
GLUCOSE BLD-MCNC: 74 MG/DL (ref 70–99)
HCT VFR BLD AUTO: 33.3 % (ref 35–47)
HGB BLD-MCNC: 10.5 G/DL (ref 11.7–15.7)
IMM GRANULOCYTES # BLD: 0 10E3/UL
IMM GRANULOCYTES NFR BLD: 0 %
LYMPHOCYTES # BLD AUTO: 0.7 10E3/UL (ref 0.8–5.3)
LYMPHOCYTES NFR BLD AUTO: 12 %
MCH RBC QN AUTO: 30.7 PG (ref 26.5–33)
MCHC RBC AUTO-ENTMCNC: 31.5 G/DL (ref 31.5–36.5)
MCV RBC AUTO: 97 FL (ref 78–100)
MONOCYTES # BLD AUTO: 0.5 10E3/UL (ref 0–1.3)
MONOCYTES NFR BLD AUTO: 9 %
NEUTROPHILS # BLD AUTO: 4.6 10E3/UL (ref 1.6–8.3)
NEUTROPHILS NFR BLD AUTO: 74 %
NRBC # BLD AUTO: 0 10E3/UL
NRBC BLD AUTO-RTO: 0 /100
PLATELET # BLD AUTO: 193 10E3/UL (ref 150–450)
POTASSIUM BLD-SCNC: 3.8 MMOL/L (ref 3.4–5.3)
PROT SERPL-MCNC: 6.7 G/DL (ref 6.8–8.8)
RBC # BLD AUTO: 3.42 10E6/UL (ref 3.8–5.2)
SODIUM SERPL-SCNC: 141 MMOL/L (ref 133–144)
WBC # BLD AUTO: 6.1 10E3/UL (ref 4–11)

## 2022-10-04 PROCEDURE — 82310 ASSAY OF CALCIUM: CPT

## 2022-10-04 PROCEDURE — 70553 MRI BRAIN STEM W/O & W/DYE: CPT

## 2022-10-04 PROCEDURE — 36415 COLL VENOUS BLD VENIPUNCTURE: CPT

## 2022-10-04 PROCEDURE — 99215 OFFICE O/P EST HI 40 MIN: CPT | Performed by: PSYCHIATRY & NEUROLOGY

## 2022-10-04 PROCEDURE — 255N000002 HC RX 255 OP 636: Performed by: PSYCHIATRY & NEUROLOGY

## 2022-10-04 PROCEDURE — 85004 AUTOMATED DIFF WBC COUNT: CPT

## 2022-10-04 PROCEDURE — A9585 GADOBUTROL INJECTION: HCPCS | Performed by: PSYCHIATRY & NEUROLOGY

## 2022-10-04 PROCEDURE — G0463 HOSPITAL OUTPT CLINIC VISIT: HCPCS

## 2022-10-04 RX ORDER — GADOBUTROL 604.72 MG/ML
6 INJECTION INTRAVENOUS ONCE
Status: COMPLETED | OUTPATIENT
Start: 2022-10-04 | End: 2022-10-04

## 2022-10-04 RX ADMIN — GADOBUTROL 6 ML: 604.72 INJECTION INTRAVENOUS at 14:14

## 2022-10-04 ASSESSMENT — PAIN SCALES - GENERAL: PAINLEVEL: NO PAIN (0)

## 2022-10-04 NOTE — PROGRESS NOTES
"Oncology Rooming Note    October 4, 2022 3:01 PM   Olga Bailey is a 77 year old female who presents for:    Chief Complaint   Patient presents with     Oncology Clinic Visit     Initial Vitals: /89   Pulse 68   Temp 99.5  F (37.5  C) (Oral)   Resp 16   SpO2 97%  Estimated body mass index is 20.53 kg/m  as calculated from the following:    Height as of 6/3/22: 1.753 m (5' 9\").    Weight as of 6/3/22: 63 kg (139 lb). There is no height or weight on file to calculate BSA.  No Pain (0) Comment: Data Unavailable   No LMP recorded. Patient has had a hysterectomy.  Allergies reviewed: Yes  Medications reviewed: Yes    Medications: Medication refills not needed today.  Pharmacy name entered into EPIC: Data Unavailable    Clinical concerns: no       Shari J. Schoenberger, CMA            "

## 2022-10-04 NOTE — LETTER
10/4/2022         RE: Olga Bailey  C/o Lakesha Calix  [address] [facility] [address]        Dear Colleague,    Thank you for referring your patient, Olga Bailey, to the [facility]. Please see a copy of my visit note below.    NEURO-ONCOLOGY VISIT  Oct 4, 2022    CHIEF COMPLAINT: Ms. Olga Bailey is a 77 year old right-handed woman with a left frontoparietal glioblastoma (IDH wild-type, MGMT promoter methylated), diagnosed following gross total resection on 2/23/2021. Initiation of upfront cancer-directed treatment was delayed due to a complicated hospitalization. Given a resolving infection and lowered functional status, radiation alone was initiated on 5/4/2021 and completed on 5/27/2021.     Olga started adjuvant therapy with 150mg/m2 dosing of temozolomide, however, cycle 1 was complicated by significant hematologic toxicity. Dosing was changed to metronomic/ daily dosing in 7/2021 and this was well tolerated. Imaging from 8/2021 and 11/2021 demonstrates positive treatment effect.    Chemotherapy was placed on a hold in 10/2021 in the setting of severe constipation, but then, resumed. She has since completed the planned 6 months of adjuvant temozolomide as of 12/2021.    Imaging since 2/2022 has been without concern for cancer recurrence, however, imaging in 7/2022 showed a punctate area of contrast enhancement of indeterminate significance. Repeat imaging in 10/2022 showed near resolution of that punctate lesion and no evidence of cancer recurrence. The plan remains to continue on imaging surveillance.     Olga is presenting in follow-up accompanied by La Nena (daughter) and Fahad ().    HISTORY OF PRESENT ILLNESS  -Olga is fairly well today. La Nena and Fahad agree. They believe she is making progress of late, specifically noting improvement in cognition but not as much in fine motor skills. Right arm use stable. She walked 200 ft  "with PT last week with walker and able to beat her  in checkers. She is now brushing her own teeth.   -They note worsening of the bilateral hand tremor and mouth posturing and wonder about Parkinson's or tardive dyskinesia. Medications were thoroughly reviewed.   -They are understandably frustrated with the fact that therapies at the assisted living facility are using a \"maintenance\" approach.   -Sleep is good. Energy is fairly good during the day; less naps in the afternoon. Taking Ritalin 5 mg as needed.   -No recurrent constipation.   -Mood is improved, remains off Remeron and Zyprexa.   -No headaches.  -Off steroids.   -No recurrent seizures since her last visit, tolerating Keppra.  -On anticoagulation with no significant signs/ symptoms of bleeding. They inquire about the use of melatonin with anticoagulants.       MEDICATIONS   Current Outpatient Medications   Medication     acetaminophen (TYLENOL) 500 MG tablet     albuterol (PROAIR HFA/PROVENTIL HFA/VENTOLIN HFA) 108 (90 Base) MCG/ACT inhaler     albuterol (PROVENTIL) (2.5 MG/3ML) 0.083% neb solution     amLODIPine (NORVASC) 5 MG tablet     busPIRone HCl (BUSPAR) 30 MG tablet     docosanol (ABREVA) 10 % CREA cream     FLUoxetine (PROZAC) 20 MG capsule     ketoconazole (NIZORAL) 2 % external shampoo     levETIRAcetam (KEPPRA) 1000 MG tablet     levETIRAcetam (KEPPRA) 250 MG tablet     loperamide (IMODIUM) 2 MG capsule     melatonin 1 MG TABS tablet     multivitamin, therapeutic (THERA-VIT) TABS tablet     ondansetron (ZOFRAN) 4 MG tablet     pantoprazole (PROTONIX) 40 MG EC tablet     rivaroxaban ANTICOAGULANT (XARELTO ANTICOAGULANT) 20 MG TABS tablet     No current facility-administered medications for this visit.     Current Outpatient Medications   Medication Sig Dispense Refill     acetaminophen (TYLENOL) 500 MG tablet Take 500 mg by mouth every 4 hours as needed for mild pain        albuterol (PROAIR HFA/PROVENTIL HFA/VENTOLIN HFA) 108 (90 Base) " MCG/ACT inhaler Inhale 2 puffs into the lungs every 4 hours as needed for wheezing 18 g 3     albuterol (PROVENTIL) (2.5 MG/3ML) 0.083% neb solution Take 1 vial (2.5 mg) by nebulization every 4 hours as needed for wheezing or shortness of breath / dyspnea       amLODIPine (NORVASC) 5 MG tablet Take 1 tablet (5 mg) by mouth daily (Patient taking differently: Take 5 mg by mouth daily Hold if SBP >160 or DBP <90)       busPIRone HCl (BUSPAR) 30 MG tablet Take 30 mg by mouth 2 times daily       docosanol (ABREVA) 10 % CREA cream Apply topically 5 times daily Apply to cold sore on lip       FLUoxetine (PROZAC) 20 MG capsule Take 2 capsules (40 mg) by mouth daily 30 capsule 0     ketoconazole (NIZORAL) 2 % external shampoo Apply topically twice a week On Wed & Sun       levETIRAcetam (KEPPRA) 1000 MG tablet Take 1,000 mg by mouth 2 times daily Give with 250mg tab = 1250mg total dose twice daily       levETIRAcetam (KEPPRA) 250 MG tablet Take 250 mg by mouth 2 times daily Give with 100mg tab = 1250mg       loperamide (IMODIUM) 2 MG capsule TAKE 2 CAPSULES (4MG) BY MOUTH AFTER FIRST LOOSE STOOL, THEN;TAKE 1 CAPSULE AFTER CONSECUTIVE STOOLS, MAX 4 CAPS/24H       melatonin 1 MG TABS tablet Take 1 tablet (1 mg) by mouth nightly as needed for sleep       multivitamin, therapeutic (THERA-VIT) TABS tablet Take 1 tablet by mouth daily       ondansetron (ZOFRAN) 4 MG tablet Take 1 tablet (4 mg) by mouth every 8 hours as needed for nausea 30 tablet 3     pantoprazole (PROTONIX) 40 MG EC tablet Take 1 tablet (40 mg) by mouth 2 times daily (before meals) 30 tablet 0     rivaroxaban ANTICOAGULANT (XARELTO ANTICOAGULANT) 20 MG TABS tablet Take 1 tablet (20 mg) by mouth daily (with dinner) 30 tablet 3     DRUG ALLERGIES   Allergies   Allergen Reactions     Pilocarpine Headache and Nausea and Vomiting     Chlorhexidine Itching and Rash     Aripiprazole Headache and Other (See Comments)     Insomnia, nightmares     Bacitracin Rash      Bactroban is effective; No difficulties     Erythromycin      intolerant.     Meperidine Hcl      intolerant only   Demerol     Chloraprep One Step Rash       IMMUNIZATIONS   Immunization History   Administered Date(s) Administered     COVID-19,PF,Moderna 03/18/2021, 07/22/2021, 01/18/2022     Flu 65+ Years 09/28/2020     HepB 01/01/1990     Influenza (IIV3) PF 01/01/2001     Influenza Vaccine IM > 6 months Valent IIV4 (Alfuria,Fluzone) 09/28/2020     Influenza, Quad, High Dose, Pf, 65yr+ (Fluzone HD) 09/28/2020     Pneumo Conj 13-V (2010&after) 10/29/2014     Pneumococcal 23 valent 07/22/2020     TDAP Vaccine (Boostrix) 08/22/2016     Tetanus 06/13/2004     Zoster vaccine recombinant adjuvanted (SHINGRIX) 12/24/2019, 10/12/2020     Zoster vaccine, live 12/18/2008       ONCOLOGIC HISTORY  -2/2021 PRESENTATION: Progressive aphasia.   -2/19/2021 MR brain imaging with 3.3 x 2.8 x 2.8 cm enhancing mass in left frontal-parietal region. A second contrast enhancing lesion (0.5 x 1.0 x 1.0 cm) in right occipital lobe which is dural based and largely stable in size since 9/2011; likely representing a meningioma.  -2/23/2021 SURGERY: Gross total resection by Dr. Cummings  PATHOLOGY: Glioblastoma; IDH1-R132H wild-type/ IDH 1 and 2 wildtype, MGMT promoter methylated. Not BRAF mutated.   -3/23/2021 NEURO-ONC: Recommending chemoradiotherapy.   -3/2021 ADMISSION: SOB and chest pain; CT PA negative, but bilateral DVT noted on U/S. Started on Lovenox. Acute hypoxic respiratory failure in the setting of bilateral pneumonia; presumed PJP, concern for transfusion-related acute lung injury and right hemidiaphragm paralysis. Seizure. Right retroperitoneal hematoma. Ileus. Non-severe malnutrition on TPN with concern for refeeding syndrome. 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 Shock Liver.  -5/4 - 5/27/2021 RADS: 15 fractions.   -5/25/2021 NEURO-ONC/ DEVICE: Discussed the role of Optune; to be considered. Repeat MRI in 4 weeks with plans to  start adjuvant temozolomide.   -6/22/2021 NEURO-ONC/ MRB/ CHEMO: Clinically improving. Imaging largely stable. Starting adjuvant temozolomide 150mg/m2 (250mg), cycle 1 (start date 6/24).   -7/20/2021 NEURO-ONC/ CHEMO: Clinically improving. Thrombocytopenia noted with adjuvant temozolomide dosing, platelets of 26K; will transition to metronomic dosing 50mg/m2 (100mg) daily to start once platelets are > 80K.    -8/17/2021 NEURO-ONC/ MRB/ CHEMO: Clinically improving. Saw Dr. Gamboa, who recommended starting anticoagulation; on Eliquis. Imaging with positive treatment response. Continue metronomic/ daily dosing at 50mg/m2 (100mg) through 12/2021.    -9/14/2021 NEURO-ONC/ CHEMO: Clinically improving. Continue metronomic/ daily dosing at 50mg/m2 (100mg) through 12/2021.    -10/12/2021 NEURO-ONC/ CHEMO: Clinically improving. Holding temozolomide and Zofran in the setting of severe constipation. Abdominal x-ray with high stool burden; consulted Dr. Hodge for management of constipation given history of ileus.   -10/28/2021 CHEMO: Temozolomide restarted.   -11/16/2021 NEURO-ONC/ MRB/ CHEMO: Clinically improving Less constipation, continued low appetite; referral to palliative care for mental health management. Referral to Dr. Agudelo to optimize rehab. Imaging with continued positive treatment response. Continue daily temozolomide.   -12/21/2021 NEURO-ONC/ CHEMO: Clinically improving in terms of appetite, energy. Stopping daily temozolomide at the end of the month. Discussion with Dr. Gamboa regarding removal of IVC filter.   -2/14/2022 MRB with a stable postoperative changes of left parietal craniotomy and tumor resection.   -3/1/2022 NEURO-ONC: Clinically deconditioned, mood is depressed; following with Cancer PM&R and Palliative Care. With imaging stable, continue imaging surveillance.   -4/4/2022 MRB with stable postoperative change of left-sided craniotomy and tumor resection.  -7/19/2022 NEURO-ONC/ MRB: Clinically  "improving. Imaging with no overt evidence of cancer recurrence, new punctate area of contrast enhancement of indeterminate significance; continue imaging surveillance.   -10/4/2022 NEURO-ONC/ MRB: Clinically with improved cognition. Recommend stopping ritalin in light of no known benefit and worsening tremor. Reestablish care with Dr. Agudelo. Imaging with no overt evidence of cancer recurrence, the punctate area of contrast enhancement nearly resolved; continue imaging surveillance.       PHYSICAL EXAMINATION  /89   Pulse 68   Temp 99.5  F (37.5  C) (Oral)   Resp 16   SpO2 97%   Wt Readings from Last 2 Encounters:   06/03/22 63 kg (139 lb)   04/18/22 65.6 kg (144 lb 11.2 oz)      Ht Readings from Last 2 Encounters:   06/03/22 1.753 m (5' 9\")   04/05/22 1.6 m (5' 3\")     KPS: 50-60  -Good energy today. Engaged in the conversation though only circumferentially for complicated conversation.   -Legs: Mild swelling.   -Psychiatric: Good mood, reactive affect. Pleasant, talkative.  -Neurologic:   MENTAL STATUS:     Alert, oriented. Not able to say where she lives.    Speech fluent. Did loose train of thought at times.   Comprehension with some confusion.      CRANIAL NERVES:     No masked facies.    Pupils are equal, round.     Extraocular movements full, denies diplopia.     Facial sensation intact to light touch.   No facial droop.   Hearing slightly decreased bilaterally.   Normal phonation.   MOTOR: Right arm not able to counteract gravity due to prior injury. Mild bilateral hand tremor with positioning. No rigidity/cogwheeling.   SENSATION: Intact to light touch throughout.  GAIT: Sitting in a wheelchair, did not assess today.        MEDICAL RECORDS  Obtained and personally reviewed all available outside medical records in addition to reviewing any records available in our electronic system.   Hospitalization records.     LABS  Personally reviewed all available lab results; CBC and CMP. "     IMAGING  Personally reviewed MR brain imaging from today and compared to prior imaging. To my eye, there is no overt evidence of cancer recurrence about the left parietal resection cavity. The punctate focus of enhancement within the brain parenchyma deep and posterior to the resection cavity has nearly resolved (below). There is no new concerning lesions.         Unchanged dural-based mass along the right occipital lobe, presumably meningioma.     Imaging was shown to and results were reviewed with Olga and her family.       IMPRESSION  Clinic time for this high complexity encounter was spent discussing in detail the nature of her cancer in light of her recent imaging. This was in addition to answering questions pertaining to my recommendations and devising the plan as outlined below.     Today, Olga's functional status continues to improve. With excellent ongoing management of medications, ongoing access to aggressive rehab therapies, and management of medical issues, I would anticipate continued improvement in her cognitive and physical status. To further remove unneeded medication, I would recommend stopping ritalin in light of no known benefit and worsening tremor. To optimize rehab potential and over concern for plateaued therapy approach at assisted living facility, I recommended reestablishing care with Dr. Agudelo. On examination, I am not noting any clear clinical signs of Parkinson's Disease. Recommend a consultation with psychiatry/ palliative medicine to evaluate for tardive dyskinesia. She had multiple antipsychotic type medications discontinued or dose reduced in the last year by Dr. Plaza.     Imaging as detailed above with no overt evidence of cancer recurrence. In addition, the punctate area of contrast enhancement has nearly resolved. Neuro-radiology concurred with this impression. The plan is to continue imaging surveillance per NCCN guidelines of every 3 months. In the  "meantime, Olga, Fahad, and La Nena know to call with questions or concerns or to report new complaints and can be seen sooner if needed.     Labs from today reviewed; no concern on CMP. CBC with Hb of 10.5. No need for continued lab monitoring from my standpoint and will defer to primary care.     Finally, Fahad raised concern for medication interactions between her anticoagulant and melatonin. I have notify pharmacy to look into any known interactions.     PROBLEM LIST  Glioblastoma  Aphasia  Apraxia    PLAN  -CANCER DIRECTED THERAPY-  -Repeat imaging in 3 months.    -STEROIDS-  -Off dexamethasone.    -SEIZURE MANAGEMENT-  -Given history of seizures, antiepileptics are indicated.   -Continue Keppra.     -DVT-  -Following with Dr. Gamboa; currently on Eliquis.   -Requires lifelong anticoagulation given cancer diagnosis.   -Holding on removal of the IVC filter at this time.     -Quality of life/ MOOD/ FATIGUE-  -History of depressed mood, PTSD, poor motivation, and poor appetite.   -Previously followed with palliative care to assess mental health and manage medications.  -Cannot tolerate CPAP for MARCELLE.  -Discontinue Ritalin as above due to tremor.     -REHAB NEEDS-  -PT/ OT.   -Reconnect with Dr. Agudelo in cancer rehab for therapy direction.    Return to clinic in 3 months + imaging.     Patient was also seen and examined by TERA Cornelius.     Stephanie Baker MD  Neuro-oncology      Oncology Rooming Note    October 4, 2022 3:01 PM   Olga Bailey is a 77 year old female who presents for:    Chief Complaint   Patient presents with     Oncology Clinic Visit     Initial Vitals: /89   Pulse 68   Temp 99.5  F (37.5  C) (Oral)   Resp 16   SpO2 97%  Estimated body mass index is 20.53 kg/m  as calculated from the following:    Height as of 6/3/22: 1.753 m (5' 9\").    Weight as of 6/3/22: 63 kg (139 lb). There is no height or weight on file to calculate BSA.  No Pain (0) Comment: Data Unavailable   No LMP " recorded. Patient has had a hysterectomy.  Allergies reviewed: Yes  Medications reviewed: Yes    Medications: Medication refills not needed today.  Pharmacy name entered into EPIC: Data Unavailable    Clinical concerns: no       Shari J. Schoenberger, [facility]                Again, thank you for allowing me to participate in the care of your patient.        Sincerely,        Stephanie Baker MD

## 2022-10-07 ENCOUNTER — PATIENT OUTREACH (OUTPATIENT)
Dept: ONCOLOGY | Facility: CLINIC | Age: 77
End: 2022-10-07

## 2022-10-07 NOTE — PROGRESS NOTES
[facility]: Cancer Care                                                                                          Family concerned about possible interaction with Melatonin and Xarelto. Pharmacy did confirm that there is no known interaction between the two meds. LVM on La Nena's phone. Encouraged call back if necessary      Nafisa Vee, RN, BSN  Specialty Care Coordinator  [facility]  [phone]

## 2022-10-17 ENCOUNTER — LAB REQUISITION (OUTPATIENT)
Dept: LAB | Facility: CLINIC | Age: 77
End: 2022-10-17
Payer: MEDICARE

## 2022-10-17 DIAGNOSIS — T45.1X5A ADVERSE EFFECT OF ANTINEOPLASTIC AND IMMUNOSUPPRESSIVE DRUGS, INITIAL ENCOUNTER: ICD-10-CM

## 2022-10-17 DIAGNOSIS — R11.2 NAUSEA WITH VOMITING, UNSPECIFIED: ICD-10-CM

## 2022-10-18 ENCOUNTER — TELEPHONE (OUTPATIENT)
Dept: ONCOLOGY | Facility: CLINIC | Age: 77
End: 2022-10-18

## 2022-10-18 ENCOUNTER — VIRTUAL VISIT (OUTPATIENT)
Dept: ONCOLOGY | Facility: CLINIC | Age: 77
End: 2022-10-18
Attending: STUDENT IN AN ORGANIZED HEALTH CARE EDUCATION/TRAINING PROGRAM
Payer: MEDICARE

## 2022-10-18 DIAGNOSIS — R53.0 NEOPLASTIC MALIGNANT RELATED FATIGUE: ICD-10-CM

## 2022-10-18 DIAGNOSIS — C71.9 GLIOBLASTOMA (H): Primary | ICD-10-CM

## 2022-10-18 DIAGNOSIS — R41.3 SHORT-TERM MEMORY LOSS: ICD-10-CM

## 2022-10-18 DIAGNOSIS — R41.89 COGNITIVE CHANGE: ICD-10-CM

## 2022-10-18 DIAGNOSIS — R53.81 PHYSICAL DECONDITIONING: ICD-10-CM

## 2022-10-18 PROCEDURE — G0463 HOSPITAL OUTPT CLINIC VISIT: HCPCS | Mod: PN,RTG | Performed by: STUDENT IN AN ORGANIZED HEALTH CARE EDUCATION/TRAINING PROGRAM

## 2022-10-18 PROCEDURE — 99215 OFFICE O/P EST HI 40 MIN: CPT | Mod: 95 | Performed by: STUDENT IN AN ORGANIZED HEALTH CARE EDUCATION/TRAINING PROGRAM

## 2022-10-18 NOTE — PROGRESS NOTES
Olga is a 77 year old who is being evaluated via a billable video visit.      Patient stated she is in the state of [place] for the visit today.    How would you like to obtain your AVS? Rick  If the video visit is dropped, the invitation should be resent by: Text to cell phone: [phone] ('s #)  Will anyone else be joining your video visit? Yes,         Video-Visit Details    Video Start Time: 9:35 AM    Type of service:  Video Visit    Video End Time:10:00 AM    Originating Location (pt. Location): Assisted Living        Distant Location (provider location):  On-site    Platform used for Video Visit: Mirtha Smallwood, Virtual Visit Facilitator

## 2022-10-18 NOTE — LETTER
10/18/2022         RE: Olga Bailey  C/o Lakesha Calix  [address]  [place] [facility] [address]        Dear Colleague,    Thank you for referring your patient, Olga Bailey, to the [facility] [place]. Please see a copy of my visit note below.    Olga is a 77 year old who is being evaluated via a billable video visit.      Patient stated she is in the state of [place] for the visit today.    How would you like to obtain your AVS? MyChart  If the video visit is dropped, the invitation should be resent by: Text to cell phone: [phone] ('s #)  Will anyone else be joining your video visit? Yes,         Video-Visit Details    Video Start Time: 9:35 AM    Type of service:  Video Visit    Video End Time:10:00 AM    Originating Location (pt. Location): Assisted Living        Distant Location (provider location):  On-site    Platform used for Video Visit: Mirtha Smallwood, Virtual Visit Facilitator      [facility]   PM&R clinic note        Interval history:     Olga Bailey presents to clinic today for follow up reg her rehab needs.   She has h/o left frontoparietal glioblastoma (IDH wild-type, MGMT promoter methylated).  Was last seen in clinic on 3/1/22.  Recommendations included:  1. Therapy/equipment/braces:  1. Restart home PT and OT for worsened functional mobility, right-sided neglect and impaired coordination severely affecting mobility and ADLs.  2. Follow up: 2 months.    Oncologic History:    2/2021 PRESENTATION: Progressive aphasia.     2/19/2021 MR brain imaging with 3.3 x 2.8 x 2.8 cm enhancing mass in left frontal-parietal region. A second contrast enhancing lesion (0.5 x 1.0 x 1.0 cm) in right occipital lobe which is dural based and largely stable in size since 9/2011; likely representing a meningioma.    2/23/2021 SURGERY: Gross total resection by Dr. Cummings    PATHOLOGY: Glioblastoma; IDH1-R132H wild-type/  IDH 1 and 2 wildtype, MGMT promoter methylated. Not BRAF mutated.     3/23/2021 NEURO-ONC: Recommending chemoradiotherapy.     3/2021 ADMISSION: SOB and chest pain; CT PA negative, but bilateral DVT noted on U/S. Started on Lovenox. Acute hypoxic respiratory failure in the setting of bilateral pneumonia; presumed PJP, concern for transfusion-related acute lung injury and right hemidiaphragm paralysis. Seizure. Right retroperitoneal hematoma. Ileus. Non-severe malnutrition on TPN with concern for refeeding syndrome. 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 Shock Liver.    5/4 - 5/27/2021 RADS: 15 fractions.     5/25/2021 NEURO-ONC/ DEVICE: Discussed the role of Optune; to be considered. Repeat MRI in 4 weeks with plans to start adjuvant temozolomide.     6/22/2021 NEURO-ONC/ MRB/ CHEMO: Clinically improving. Imaging largely stable. Starting adjuvant temozolomide 150mg/m2 (250mg), cycle 1 (start date 6/24).     7/20/2021 NEURO-ONC/ CHEMO: Clinically improving. Thrombocytopenia noted with adjuvant temozolomide dosing, platelets of 26K; will transition to metronomic dosing 50mg/m2 (100mg) daily to start once platelets are > 80K.      8/17/2021 NEURO-ONC/ MRB/ CHEMO: Clinically improving. Saw Dr. Gamboa, who recommended starting anticoagulation; on Eliquis. Imaging with positive treatment response. Continue metronomic/ daily dosing at 50mg/m2 (100mg) through 12/2021.      9/14/2021 NEURO-ONC/ CHEMO: Clinically improving. Continue metronomic/ daily dosing at 50mg/m2 (100mg) through 12/2021.      10/12/2021 NEURO-ONC/ CHEMO: Clinically improving. Holding temozolomide and Zofran in the setting of severe constipation. Abdominal x-ray with high stool burden; consulted Dr. Hodge for management of constipation given history of ileus.     10/28/2021 CHEMO: Temozolomide restarted.     11/16/2021 NEURO-ONC/ MRB/ CHEMO: Clinically improving Less constipation, continued low appetite; referral to palliative care for mental health  management. Referral to Dr. Agudelo to optimize rehab. Imaging with continued positive treatment response. Continue daily temozolomide.     3/1/2022 follow-up with Dr. Gay-patient doing overall okay.  Sleep and energy are better.  Following with palliative care and has Prozac and BuSpar, and mood is better.  Home therapies had stopped about 4 weeks ago, and interested in restarting.  She has had recent fall due to instability in her gait when she presented to the ER and imaging did not reveal any head injury or other injuries that were sustained.  Also presented to the ER as was leaning to the right, and there was concern for stroke however stroke work-up was negative.    Follow-up visit with Dr. Baker on 10/4/22.  Clinically improved cognition.  Recommended stopping Ritalin in light of no known benefit and worsening tremor.  Reestablish care with Dr. Agudelo.  Imaging with no overt evidence of cancer recurrence, the punctate area of contrast-enhancement nearly resolved.  Continue imaging surveillance.      Symptoms,  Patient presents for today's virtual visit.  Her  is present during the visit.  She is currently in a nursing home, [facility].  She states that she is walking some distances during the day with a walker and standby assist.  She denies any falls or near falls in the last month.  She feels that her appetite and intake both oral and hydration has been good.  She is very interested to see how she can improve her activity level.  She states that she has physical therapy 2-3 times weekly.  She denies any further concerns with her bowel movements, as constipation was a difficulty in the past.  She states that she has been more regular.  She continues with bladder incontinence, and wears adult diapers for this.  She states that at times she feels she cannot get to the bathroom at times fast enough, and other times she does not have any control so it really has been a mixed picture in terms of her  bladder.  Her  indicates that she is on maintenance physical therapy at her current facility.  She is not currently taking in any protein supplementation at this point.  She is also interested in improving some of the short-term memory difficulties and working on this with therapy.    Therapies/HEP,  Currently participating in physical therapy 2-3 times per week.      Functionally,   Has been clinically improving for a few months now.      Social history is unchanged.      Medications:  Current Outpatient Medications   Medication Sig Dispense Refill     acetaminophen (TYLENOL) 500 MG tablet Take 500 mg by mouth every 4 hours as needed for mild pain        albuterol (PROAIR HFA/PROVENTIL HFA/VENTOLIN HFA) 108 (90 Base) MCG/ACT inhaler Inhale 2 puffs into the lungs every 4 hours as needed for wheezing 18 g 3     albuterol (PROVENTIL) (2.5 MG/3ML) 0.083% neb solution Take 1 vial (2.5 mg) by nebulization every 4 hours as needed for wheezing or shortness of breath / dyspnea       amLODIPine (NORVASC) 5 MG tablet Take 1 tablet (5 mg) by mouth daily (Patient taking differently: Take 5 mg by mouth daily Hold if SBP >160 or DBP <90)       busPIRone HCl (BUSPAR) 30 MG tablet Take 30 mg by mouth 2 times daily       docosanol (ABREVA) 10 % CREA cream Apply topically 5 times daily Apply to cold sore on lip       FLUoxetine (PROZAC) 20 MG capsule Take 2 capsules (40 mg) by mouth daily 30 capsule 0     ketoconazole (NIZORAL) 2 % external shampoo Apply topically twice a week On Wed & Sun       levETIRAcetam (KEPPRA) 1000 MG tablet Take 1,000 mg by mouth 2 times daily Give with 250mg tab = 1250mg total dose twice daily       levETIRAcetam (KEPPRA) 250 MG tablet Take 250 mg by mouth 2 times daily Give with 100mg tab = 1250mg       loperamide (IMODIUM) 2 MG capsule TAKE 2 CAPSULES (4MG) BY MOUTH AFTER FIRST LOOSE STOOL, THEN;TAKE 1 CAPSULE AFTER CONSECUTIVE STOOLS, MAX 4 CAPS/24H       melatonin 1 MG TABS tablet Take 1 tablet  (1 mg) by mouth nightly as needed for sleep       multivitamin, therapeutic (THERA-VIT) TABS tablet Take 1 tablet by mouth daily       ondansetron (ZOFRAN) 4 MG tablet Take 1 tablet (4 mg) by mouth every 8 hours as needed for nausea 30 tablet 3     pantoprazole (PROTONIX) 40 MG EC tablet Take 1 tablet (40 mg) by mouth 2 times daily (before meals) 30 tablet 0     rivaroxaban ANTICOAGULANT (XARELTO ANTICOAGULANT) 20 MG TABS tablet Take 1 tablet (20 mg) by mouth daily (with dinner) 30 tablet 3              Physical Exam:   There were no vitals taken for this visit.  Gen: NAD, pleasant and cooperative   HEENT: Extraocular movements appear intact  Pulm: non-labored breathing in room air  Neuro/MSK:   Orientation: Oriented to person, place.  Exhibits fair insight into her condition and ongoing treatment  Motor: Observed moving upper extremities actively against gravity    Labs/Imaging:  Lab Results   Component Value Date    WBC 6.1 10/04/2022    HGB 10.5 (L) 10/04/2022    HCT 33.3 (L) 10/04/2022    MCV 97 10/04/2022     10/04/2022     Lab Results   Component Value Date     10/04/2022    POTASSIUM 3.8 10/04/2022    CHLORIDE 106 10/04/2022    CO2 30 10/04/2022    GLC 74 10/04/2022     Lab Results   Component Value Date    GFRESTIMATED 68 10/04/2022    GFRESTBLACK >90 07/09/2021     Lab Results   Component Value Date    AST 19 10/04/2022    ALT 32 10/04/2022    ALKPHOS 87 10/04/2022    BILITOTAL 0.3 10/04/2022     Lab Results   Component Value Date    INR 1.44 (H) 02/14/2022     Lab Results   Component Value Date    BUN 16 10/04/2022    CR 0.87 10/04/2022              Assessment/Plan   Olga Bailey presents to clinic today for follow up reg her rehab needs. She has h/o left frontoparietal glioblastoma (IDH wild-type, MGMT promoter methylated).nWas last seen in clinic on 3/1/22.  Please see below recommendations from today's visit.  Patient is only on maintenance physical therapy at this point, however with  her improvement clinically I feel that she has significant potential for functional improvement, and as a result needs more physical therapy at a higher intensity and frequency/time.  We will reach out to her nursing home to communicate this and place a new order if needed for this.  In addition she would benefit from cognitive rehabilitation to help with short-term memory difficulties, so a speech therapy referral was placed to get this started at her facility.  We also discussed nutritional recommendations including protein supplementation daily, and a list of recommended supplement drinks was sent to her.  We will plan a 2 to 3-month return visit.  Patient and her  are in agreement with this plan.      1. Therapy/equipment/braces:  1. A physical therapy referral was placed to her current facility to initiate more aggressive physical therapy regimen in an view of her clinical improvement and functional potential.  2. A speech therapy referral was placed to help with cognitive rehabilitation and short-term memory exercises to maximize benefit.  3. Start 1-2 protein shakes daily for supplementation.  Can try Premier protein or Nestlé fair life protein shakes.  2. Follow up: 2-3 months.      Yesenia Agudelo MD  Physical Medicine & Rehabilitation      50 minutes spent on the date of the encounter doing chart review, history and exam, documentation and further activities as noted above.              Again, thank you for allowing me to participate in the care of your patient.        Sincerely,        Yesenia Agudelo MD

## 2022-10-18 NOTE — TELEPHONE ENCOUNTER
Left Voicemail (1st Attempt) for the patient to call back and schedule the following:    Appointment type: Virtual visit  Provider: Dr. Yesenia Agudelo  Return date: 2-3 month f/u  Specialty phone number: [phone] [place] Scheduling  Additional appointment(s) needed: N/A  Additonal Notes: N/A  Heather Smallwood Virtual Visit Facilitator

## 2022-10-18 NOTE — PROGRESS NOTES
[facility]   PM&R clinic note        Interval history:     Olga Bailey presents to clinic today for follow up reg her rehab needs.   She has h/o left frontoparietal glioblastoma (IDH wild-type, MGMT promoter methylated).  Was last seen in clinic on 3/1/22.  Recommendations included:  1. Therapy/equipment/braces:  1. Restart home PT and OT for worsened functional mobility, right-sided neglect and impaired coordination severely affecting mobility and ADLs.  2. Follow up: 2 months.    Oncologic History:    2/2021 PRESENTATION: Progressive aphasia.     2/19/2021 MR brain imaging with 3.3 x 2.8 x 2.8 cm enhancing mass in left frontal-parietal region. A second contrast enhancing lesion (0.5 x 1.0 x 1.0 cm) in right occipital lobe which is dural based and largely stable in size since 9/2011; likely representing a meningioma.    2/23/2021 SURGERY: Gross total resection by Dr. Cummings    PATHOLOGY: Glioblastoma; IDH1-R132H wild-type/ IDH 1 and 2 wildtype, MGMT promoter methylated. Not BRAF mutated.     3/23/2021 NEURO-ONC: Recommending chemoradiotherapy.     3/2021 ADMISSION: SOB and chest pain; CT PA negative, but bilateral DVT noted on U/S. Started on Lovenox. Acute hypoxic respiratory failure in the setting of bilateral pneumonia; presumed PJP, concern for transfusion-related acute lung injury and right hemidiaphragm paralysis. Seizure. Right retroperitoneal hematoma. Ileus. Non-severe malnutrition on TPN with concern for refeeding syndrome. 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 Shock Liver.    5/4 - 5/27/2021 RADS: 15 fractions.     5/25/2021 NEURO-ONC/ DEVICE: Discussed the role of Optune; to be considered. Repeat MRI in 4 weeks with plans to start adjuvant temozolomide.     6/22/2021 NEURO-ONC/ MRB/ CHEMO: Clinically improving. Imaging largely stable. Starting adjuvant temozolomide 150mg/m2 (250mg), cycle 1 (start date 6/24).     7/20/2021 NEURO-ONC/ CHEMO: Clinically improving.  Thrombocytopenia noted with adjuvant temozolomide dosing, platelets of 26K; will transition to metronomic dosing 50mg/m2 (100mg) daily to start once platelets are > 80K.      8/17/2021 NEURO-ONC/ MRB/ CHEMO: Clinically improving. Saw Dr. Gamboa, who recommended starting anticoagulation; on Eliquis. Imaging with positive treatment response. Continue metronomic/ daily dosing at 50mg/m2 (100mg) through 12/2021.      9/14/2021 NEURO-ONC/ CHEMO: Clinically improving. Continue metronomic/ daily dosing at 50mg/m2 (100mg) through 12/2021.      10/12/2021 NEURO-ONC/ CHEMO: Clinically improving. Holding temozolomide and Zofran in the setting of severe constipation. Abdominal x-ray with high stool burden; consulted Dr. Hodge for management of constipation given history of ileus.     10/28/2021 CHEMO: Temozolomide restarted.     11/16/2021 NEURO-ONC/ MRB/ CHEMO: Clinically improving Less constipation, continued low appetite; referral to palliative care for mental health management. Referral to Dr. Agudelo to optimize rehab. Imaging with continued positive treatment response. Continue daily temozolomide.     3/1/2022 follow-up with Dr. Gay-patient doing overall okay.  Sleep and energy are better.  Following with palliative care and has Prozac and BuSpar, and mood is better.  Home therapies had stopped about 4 weeks ago, and interested in restarting.  She has had recent fall due to instability in her gait when she presented to the ER and imaging did not reveal any head injury or other injuries that were sustained.  Also presented to the ER as was leaning to the right, and there was concern for stroke however stroke work-up was negative.    Follow-up visit with Dr. Baker on 10/4/22.  Clinically improved cognition.  Recommended stopping Ritalin in light of no known benefit and worsening tremor.  Reestablish care with Dr. Agudelo.  Imaging with no overt evidence of cancer recurrence, the punctate area of contrast-enhancement nearly  resolved.  Continue imaging surveillance.      Symptoms,  Patient presents for today's virtual visit.  Her  is present during the visit.  She is currently in a nursing home, [facility].  She states that she is walking some distances during the day with a walker and standby assist.  She denies any falls or near falls in the last month.  She feels that her appetite and intake both oral and hydration has been good.  She is very interested to see how she can improve her activity level.  She states that she has physical therapy 2-3 times weekly.  She denies any further concerns with her bowel movements, as constipation was a difficulty in the past.  She states that she has been more regular.  She continues with bladder incontinence, and wears adult diapers for this.  She states that at times she feels she cannot get to the bathroom at times fast enough, and other times she does not have any control so it really has been a mixed picture in terms of her bladder.  Her  indicates that she is on maintenance physical therapy at her current facility.  She is not currently taking in any protein supplementation at this point.  She is also interested in improving some of the short-term memory difficulties and working on this with therapy.    Therapies/HEP,  Currently participating in physical therapy 2-3 times per week.      Functionally,   Has been clinically improving for a few months now.      Social history is unchanged.      Medications:  Current Outpatient Medications   Medication Sig Dispense Refill     acetaminophen (TYLENOL) 500 MG tablet Take 500 mg by mouth every 4 hours as needed for mild pain        albuterol (PROAIR HFA/PROVENTIL HFA/VENTOLIN HFA) 108 (90 Base) MCG/ACT inhaler Inhale 2 puffs into the lungs every 4 hours as needed for wheezing 18 g 3     albuterol (PROVENTIL) (2.5 MG/3ML) 0.083% neb solution Take 1 vial (2.5 mg) by nebulization every 4 hours as needed for wheezing or shortness of  breath / dyspnea       amLODIPine (NORVASC) 5 MG tablet Take 1 tablet (5 mg) by mouth daily (Patient taking differently: Take 5 mg by mouth daily Hold if SBP >160 or DBP <90)       busPIRone HCl (BUSPAR) 30 MG tablet Take 30 mg by mouth 2 times daily       docosanol (ABREVA) 10 % CREA cream Apply topically 5 times daily Apply to cold sore on lip       FLUoxetine (PROZAC) 20 MG capsule Take 2 capsules (40 mg) by mouth daily 30 capsule 0     ketoconazole (NIZORAL) 2 % external shampoo Apply topically twice a week On Wed & Sun       levETIRAcetam (KEPPRA) 1000 MG tablet Take 1,000 mg by mouth 2 times daily Give with 250mg tab = 1250mg total dose twice daily       levETIRAcetam (KEPPRA) 250 MG tablet Take 250 mg by mouth 2 times daily Give with 100mg tab = 1250mg       loperamide (IMODIUM) 2 MG capsule TAKE 2 CAPSULES (4MG) BY MOUTH AFTER FIRST LOOSE STOOL, THEN;TAKE 1 CAPSULE AFTER CONSECUTIVE STOOLS, MAX 4 CAPS/24H       melatonin 1 MG TABS tablet Take 1 tablet (1 mg) by mouth nightly as needed for sleep       multivitamin, therapeutic (THERA-VIT) TABS tablet Take 1 tablet by mouth daily       ondansetron (ZOFRAN) 4 MG tablet Take 1 tablet (4 mg) by mouth every 8 hours as needed for nausea 30 tablet 3     pantoprazole (PROTONIX) 40 MG EC tablet Take 1 tablet (40 mg) by mouth 2 times daily (before meals) 30 tablet 0     rivaroxaban ANTICOAGULANT (XARELTO ANTICOAGULANT) 20 MG TABS tablet Take 1 tablet (20 mg) by mouth daily (with dinner) 30 tablet 3              Physical Exam:   There were no vitals taken for this visit.  Gen: NAD, pleasant and cooperative   HEENT: Extraocular movements appear intact  Pulm: non-labored breathing in room air  Neuro/MSK:   Orientation: Oriented to person, place.  Exhibits fair insight into her condition and ongoing treatment  Motor: Observed moving upper extremities actively against gravity    Labs/Imaging:  Lab Results   Component Value Date    WBC 6.1 10/04/2022    HGB 10.5 (L)  10/04/2022    HCT 33.3 (L) 10/04/2022    MCV 97 10/04/2022     10/04/2022     Lab Results   Component Value Date     10/04/2022    POTASSIUM 3.8 10/04/2022    CHLORIDE 106 10/04/2022    CO2 30 10/04/2022    GLC 74 10/04/2022     Lab Results   Component Value Date    GFRESTIMATED 68 10/04/2022    GFRESTBLACK >90 07/09/2021     Lab Results   Component Value Date    AST 19 10/04/2022    ALT 32 10/04/2022    ALKPHOS 87 10/04/2022    BILITOTAL 0.3 10/04/2022     Lab Results   Component Value Date    INR 1.44 (H) 02/14/2022     Lab Results   Component Value Date    BUN 16 10/04/2022    CR 0.87 10/04/2022              Assessment/Plan   Olga Bailey presents to clinic today for follow up reg her rehab needs. She has h/o left frontoparietal glioblastoma (IDH wild-type, MGMT promoter methylated).nWas last seen in clinic on 3/1/22.  Please see below recommendations from today's visit.  Patient is only on maintenance physical therapy at this point, however with her improvement clinically I feel that she has significant potential for functional improvement, and as a result needs more physical therapy at a higher intensity and frequency/time.  We will reach out to her nursing home to communicate this and place a new order if needed for this.  In addition she would benefit from cognitive rehabilitation to help with short-term memory difficulties, so a speech therapy referral was placed to get this started at her facility.  We also discussed nutritional recommendations including protein supplementation daily, and a list of recommended supplement drinks was sent to her.  We will plan a 2 to 3-month return visit.  Patient and her  are in agreement with this plan.      1. Therapy/equipment/braces:  1. A physical therapy referral was placed to her current facility to initiate more aggressive physical therapy regimen in an view of her clinical improvement and functional potential.  2. A speech therapy referral  was placed to help with cognitive rehabilitation and short-term memory exercises to maximize benefit.  3. Start 1-2 protein shakes daily for supplementation.  Can try Premier protein or Nestlé fair life protein shakes.  2. Follow up: 2-3 months.      Yesenia Agudelo MD  Physical Medicine & Rehabilitation      50 minutes spent on the date of the encounter doing chart review, history and exam, documentation and further activities as noted above.

## 2022-10-18 NOTE — PATIENT INSTRUCTIONS
1.  A speech therapy referral was placed to start cognitive rehabilitation for short-term memory difficulties at your facility.  2.  Another physical therapy referral was placed to start more intensive physical therapy at your facility.  Dr. Agudelo's team will reach out to the facility and let them know of our recommendation.  3.  Continue to use your walker for all transfers and mobility.  4.  You can start 1-2 protein shakes daily for supplementation.  You can try Premier protein shakes or Nestlé fair life protein shakes with the goal to get an extra 30 to 60 g of protein in.  5.  You can try a bladder program/timed voiding to help with your bladder incontinence.  This means that you would purposefully go to the bathroom to empty your bladder every 3 hours or so even without the sensation to void.  6.  Follow-up with Dr. Agudelo for a return virtual visit in 2-3 months.

## 2022-10-18 NOTE — LETTER
10/18/2022         RE: Olga Bailey  C/o Lakesha Calix  [address]  [place] [address]        Dear Colleague,    Thank you for referring your patient, Olga Bailey, to the [facility] [place]. Please see a copy of my visit note below.    Olga is a 77 year old who is being evaluated via a billable video visit.      Patient stated she is in the state of [place] for the visit today.    How would you like to obtain your AVS? MyChart  If the video visit is dropped, the invitation should be resent by: Text to cell phone: [phone] ('s #)  Will anyone else be joining your video visit? Yes,         Video-Visit Details    Video Start Time: 9:35 AM    Type of service:  Video Visit    Video End Time:10:00 AM    Originating Location (pt. Location): Assisted Living        Distant Location (provider location):  On-site    Platform used for Video Visit: Mirtha Smallwood, Virtual Visit Facilitator      [facility]   PM&R clinic note        Interval history:     Olga Bailey presents to clinic today for follow up reg her rehab needs.   She has h/o left frontoparietal glioblastoma (IDH wild-type, MGMT promoter methylated).  Was last seen in clinic on 3/1/22.  Recommendations included:  1. Therapy/equipment/braces:  1. Restart home PT and OT for worsened functional mobility, right-sided neglect and impaired coordination severely affecting mobility and ADLs.  2. Follow up: 2 months.    Oncologic History:    2/2021 PRESENTATION: Progressive aphasia.     2/19/2021 MR brain imaging with 3.3 x 2.8 x 2.8 cm enhancing mass in left frontal-parietal region. A second contrast enhancing lesion (0.5 x 1.0 x 1.0 cm) in right occipital lobe which is dural based and largely stable in size since 9/2011; likely representing a meningioma.    2/23/2021 SURGERY: Gross total resection by Dr. Cmumings    PATHOLOGY: Glioblastoma; IDH1-R132H wild-type/  IDH 1 and 2 wildtype, MGMT promoter methylated. Not BRAF mutated.     3/23/2021 NEURO-ONC: Recommending chemoradiotherapy.     3/2021 ADMISSION: SOB and chest pain; CT PA negative, but bilateral DVT noted on U/S. Started on Lovenox. Acute hypoxic respiratory failure in the setting of bilateral pneumonia; presumed PJP, concern for transfusion-related acute lung injury and right hemidiaphragm paralysis. Seizure. Right retroperitoneal hematoma. Ileus. Non-severe malnutrition on TPN with concern for refeeding syndrome. 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 Shock Liver.    5/4 - 5/27/2021 RADS: 15 fractions.     5/25/2021 NEURO-ONC/ DEVICE: Discussed the role of Optune; to be considered. Repeat MRI in 4 weeks with plans to start adjuvant temozolomide.     6/22/2021 NEURO-ONC/ MRB/ CHEMO: Clinically improving. Imaging largely stable. Starting adjuvant temozolomide 150mg/m2 (250mg), cycle 1 (start date 6/24).     7/20/2021 NEURO-ONC/ CHEMO: Clinically improving. Thrombocytopenia noted with adjuvant temozolomide dosing, platelets of 26K; will transition to metronomic dosing 50mg/m2 (100mg) daily to start once platelets are > 80K.      8/17/2021 NEURO-ONC/ MRB/ CHEMO: Clinically improving. Saw Dr. Gamboa, who recommended starting anticoagulation; on Eliquis. Imaging with positive treatment response. Continue metronomic/ daily dosing at 50mg/m2 (100mg) through 12/2021.      9/14/2021 NEURO-ONC/ CHEMO: Clinically improving. Continue metronomic/ daily dosing at 50mg/m2 (100mg) through 12/2021.      10/12/2021 NEURO-ONC/ CHEMO: Clinically improving. Holding temozolomide and Zofran in the setting of severe constipation. Abdominal x-ray with high stool burden; consulted Dr. Hodge for management of constipation given history of ileus.     10/28/2021 CHEMO: Temozolomide restarted.     11/16/2021 NEURO-ONC/ MRB/ CHEMO: Clinically improving Less constipation, continued low appetite; referral to palliative care for mental health  management. Referral to Dr. Agudelo to optimize rehab. Imaging with continued positive treatment response. Continue daily temozolomide.     3/1/2022 follow-up with Dr. Gay-patient doing overall okay.  Sleep and energy are better.  Following with palliative care and has Prozac and BuSpar, and mood is better.  Home therapies had stopped about 4 weeks ago, and interested in restarting.  She has had recent fall due to instability in her gait when she presented to the ER and imaging did not reveal any head injury or other injuries that were sustained.  Also presented to the ER as was leaning to the right, and there was concern for stroke however stroke work-up was negative.    Follow-up visit with Dr. Baker on 10/4/22.  Clinically improved cognition.  Recommended stopping Ritalin in light of no known benefit and worsening tremor.  Reestablish care with Dr. Agudelo.  Imaging with no overt evidence of cancer recurrence, the punctate area of contrast-enhancement nearly resolved.  Continue imaging surveillance.      Symptoms,  Patient presents for today's virtual visit.  Her  is present during the visit.  She is currently in a nursing home, [facility].  She states that she is walking some distances during the day with a walker and standby assist.  She denies any falls or near falls in the last month.  She feels that her appetite and intake both oral and hydration has been good.  She is very interested to see how she can improve her activity level.  She states that she has physical therapy 2-3 times weekly.  She denies any further concerns with her bowel movements, as constipation was a difficulty in the past.  She states that she has been more regular.  She continues with bladder incontinence, and wears adult diapers for this.  She states that at times she feels she cannot get to the bathroom at times fast enough, and other times she does not have any control so it really has been a mixed picture in terms of her  bladder.  Her  indicates that she is on maintenance physical therapy at her current facility.  She is not currently taking in any protein supplementation at this point.  She is also interested in improving some of the short-term memory difficulties and working on this with therapy.    Therapies/HEP,  Currently participating in physical therapy 2-3 times per week.      Functionally,   Has been clinically improving for a few months now.      Social history is unchanged.      Medications:  Current Outpatient Medications   Medication Sig Dispense Refill     acetaminophen (TYLENOL) 500 MG tablet Take 500 mg by mouth every 4 hours as needed for mild pain        albuterol (PROAIR HFA/PROVENTIL HFA/VENTOLIN HFA) 108 (90 Base) MCG/ACT inhaler Inhale 2 puffs into the lungs every 4 hours as needed for wheezing 18 g 3     albuterol (PROVENTIL) (2.5 MG/3ML) 0.083% neb solution Take 1 vial (2.5 mg) by nebulization every 4 hours as needed for wheezing or shortness of breath / dyspnea       amLODIPine (NORVASC) 5 MG tablet Take 1 tablet (5 mg) by mouth daily (Patient taking differently: Take 5 mg by mouth daily Hold if SBP >160 or DBP <90)       busPIRone HCl (BUSPAR) 30 MG tablet Take 30 mg by mouth 2 times daily       docosanol (ABREVA) 10 % CREA cream Apply topically 5 times daily Apply to cold sore on lip       FLUoxetine (PROZAC) 20 MG capsule Take 2 capsules (40 mg) by mouth daily 30 capsule 0     ketoconazole (NIZORAL) 2 % external shampoo Apply topically twice a week On Wed & Sun       levETIRAcetam (KEPPRA) 1000 MG tablet Take 1,000 mg by mouth 2 times daily Give with 250mg tab = 1250mg total dose twice daily       levETIRAcetam (KEPPRA) 250 MG tablet Take 250 mg by mouth 2 times daily Give with 100mg tab = 1250mg       loperamide (IMODIUM) 2 MG capsule TAKE 2 CAPSULES (4MG) BY MOUTH AFTER FIRST LOOSE STOOL, THEN;TAKE 1 CAPSULE AFTER CONSECUTIVE STOOLS, MAX 4 CAPS/24H       melatonin 1 MG TABS tablet Take 1 tablet  (1 mg) by mouth nightly as needed for sleep       multivitamin, therapeutic (THERA-VIT) TABS tablet Take 1 tablet by mouth daily       ondansetron (ZOFRAN) 4 MG tablet Take 1 tablet (4 mg) by mouth every 8 hours as needed for nausea 30 tablet 3     pantoprazole (PROTONIX) 40 MG EC tablet Take 1 tablet (40 mg) by mouth 2 times daily (before meals) 30 tablet 0     rivaroxaban ANTICOAGULANT (XARELTO ANTICOAGULANT) 20 MG TABS tablet Take 1 tablet (20 mg) by mouth daily (with dinner) 30 tablet 3              Physical Exam:   There were no vitals taken for this visit.  Gen: NAD, pleasant and cooperative   HEENT: Extraocular movements appear intact  Pulm: non-labored breathing in room air  Neuro/MSK:   Orientation: Oriented to person, place.  Exhibits fair insight into her condition and ongoing treatment  Motor: Observed moving upper extremities actively against gravity    Labs/Imaging:  Lab Results   Component Value Date    WBC 6.1 10/04/2022    HGB 10.5 (L) 10/04/2022    HCT 33.3 (L) 10/04/2022    MCV 97 10/04/2022     10/04/2022     Lab Results   Component Value Date     10/04/2022    POTASSIUM 3.8 10/04/2022    CHLORIDE 106 10/04/2022    CO2 30 10/04/2022    GLC 74 10/04/2022     Lab Results   Component Value Date    GFRESTIMATED 68 10/04/2022    GFRESTBLACK >90 07/09/2021     Lab Results   Component Value Date    AST 19 10/04/2022    ALT 32 10/04/2022    ALKPHOS 87 10/04/2022    BILITOTAL 0.3 10/04/2022     Lab Results   Component Value Date    INR 1.44 (H) 02/14/2022     Lab Results   Component Value Date    BUN 16 10/04/2022    CR 0.87 10/04/2022              Assessment/Plan   Olga Bailey presents to clinic today for follow up reg her rehab needs. She has h/o left frontoparietal glioblastoma (IDH wild-type, MGMT promoter methylated).nWas last seen in clinic on 3/1/22.  Please see below recommendations from today's visit.  Patient is only on maintenance physical therapy at this point, however with  her improvement clinically I feel that she has significant potential for functional improvement, and as a result needs more physical therapy at a higher intensity and frequency/time.  We will reach out to her nursing home to communicate this and place a new order if needed for this.  In addition she would benefit from cognitive rehabilitation to help with short-term memory difficulties, so a speech therapy referral was placed to get this started at her facility.  We also discussed nutritional recommendations including protein supplementation daily, and a list of recommended supplement drinks was sent to her.  We will plan a 2 to 3-month return visit.  Patient and her  are in agreement with this plan.      1. Therapy/equipment/braces:  1. A physical therapy referral was placed to her current facility to initiate more aggressive physical therapy regimen in an view of her clinical improvement and functional potential.  2. A speech therapy referral was placed to help with cognitive rehabilitation and short-term memory exercises to maximize benefit.  3. Start 1-2 protein shakes daily for supplementation.  Can try Premier protein or [employer] fair life protein shakes.  2. Follow up: 2-3 months.      Yesenia Agudelo MD  Physical Medicine & Rehabilitation      50 minutes spent on the date of the encounter doing chart review, history and exam, documentation and further activities as noted above.              Again, thank you for allowing me to participate in the care of your patient.        Sincerely,        Yesenia Agudelo MD

## 2022-10-19 ENCOUNTER — PATIENT OUTREACH (OUTPATIENT)
Dept: ONCOLOGY | Facility: CLINIC | Age: 77
End: 2022-10-19

## 2022-10-22 ENCOUNTER — HEALTH MAINTENANCE LETTER (OUTPATIENT)
Age: 77
End: 2022-10-22

## 2022-12-03 NOTE — PLAN OF CARE
New or worsening symptoms.    PT: Attempted pt session, assisted RN in getting pt upright for SLP trial. MD present, daughter at bedside. Pt busy with SLP at this time.

## 2023-01-02 ENCOUNTER — TELEPHONE (OUTPATIENT)
Dept: ONCOLOGY | Facility: CLINIC | Age: 78
End: 2023-01-02

## 2023-01-02 NOTE — TELEPHONE ENCOUNTER
Mariangel is rescheduling her appointment with you because of the weather from 1/3/23 at 11:30 am to the next available opening of 3/7/23 at 10:30 am.  She said that you wanted her to be see in clinic this time.  She was wondering if you had anything sooner than this appointment available.  (this is the next available opening that I could find in your schedule).  Her appointment with Dr Baker has been rescheduled to 1/17/23 also.   Please send a message to the scheduling [employer] [facility] if you need help scheduling patient to a different date.  Thank you,     Monisha Juarez  Lead, Scheduling [facility] [place] and [facility] [phone]  [place] [phone]

## 2023-01-03 NOTE — PROGRESS NOTES
NEURO-ONCOLOGY VISIT  Jan 17, 2023    CHIEF COMPLAINT: Ms. Olga Bailey is a 77 year old right-handed woman with a left frontoparietal glioblastoma (IDH wild-type, MGMT promoter methylated), diagnosed following gross total resection on 2/23/2021. Initiation of upfront cancer-directed treatment was delayed due to a complicated hospitalization. Given a resolving infection and lowered functional status, radiation alone was initiated on 5/4/2021 and completed on 5/27/2021.     Olga started adjuvant therapy with 150mg/m2 dosing of temozolomide, however, cycle 1 was complicated by significant hematologic toxicity. Dosing was changed to metronomic/ daily dosing in 7/2021 and this was well tolerated. Imaging from 8/2021 and 11/2021 demonstrates positive treatment effect.    Chemotherapy was placed on a hold in 10/2021 in the setting of severe constipation, but then, resumed. She has since completed the planned 6 months of adjuvant temozolomide as of 12/2021.    Imaging since 2/2022 has been without concern for cancer recurrence, however, imaging in 7/2022 showed a punctate area of contrast enhancement of indeterminate significance. Repeat imaging in 10/2022 showed near resolution of that punctate lesion and no evidence of cancer recurrence. Imaging in 1/2023 was again without concern. The plan remains to continue on imaging surveillance.     Olga is presenting in follow-up accompanied by Fahad ().    HISTORY OF PRESENT ILLNESS  -Olga is fairly well today. Fahad agree.  -Walks with PT. Using a walker, but most of the time in the wheelchair. Strength she feels is at baseline, but Fahad feels that her strength is improving.   -Cognitively doing well; excellent Scrabble player per Fahad.   -Good energy.  -Mood is good.   -No headaches.  -No recurrent seizures since her last visit, tolerating Keppra.  -On anticoagulation with no significant signs/ symptoms of bleeding.      MEDICATIONS   Current  Outpatient Medications   Medication     acetaminophen (TYLENOL) 500 MG tablet     albuterol (PROAIR HFA/PROVENTIL HFA/VENTOLIN HFA) 108 (90 Base) MCG/ACT inhaler     albuterol (PROVENTIL) (2.5 MG/3ML) 0.083% neb solution     amLODIPine (NORVASC) 5 MG tablet     busPIRone HCl (BUSPAR) 30 MG tablet     docosanol (ABREVA) 10 % CREA cream     FLUoxetine (PROZAC) 20 MG capsule     ketoconazole (NIZORAL) 2 % external shampoo     levETIRAcetam (KEPPRA) 1000 MG tablet     levETIRAcetam (KEPPRA) 250 MG tablet     loperamide (IMODIUM) 2 MG capsule     melatonin 1 MG TABS tablet     Methylphenidate HCl (RITALIN PO)     multivitamin, therapeutic (THERA-VIT) TABS tablet     ondansetron (ZOFRAN) 4 MG tablet     pantoprazole (PROTONIX) 40 MG EC tablet     rivaroxaban ANTICOAGULANT (XARELTO ANTICOAGULANT) 20 MG TABS tablet     No current facility-administered medications for this visit.     Current Outpatient Medications   Medication Sig Dispense Refill     acetaminophen (TYLENOL) 500 MG tablet Take 500 mg by mouth every 4 hours as needed for mild pain        albuterol (PROAIR HFA/PROVENTIL HFA/VENTOLIN HFA) 108 (90 Base) MCG/ACT inhaler Inhale 2 puffs into the lungs every 4 hours as needed for wheezing 18 g 3     albuterol (PROVENTIL) (2.5 MG/3ML) 0.083% neb solution Take 1 vial (2.5 mg) by nebulization every 4 hours as needed for wheezing or shortness of breath / dyspnea       amLODIPine (NORVASC) 5 MG tablet Take 1 tablet (5 mg) by mouth daily (Patient taking differently: Take 5 mg by mouth daily Hold if SBP >160 or DBP <90)       busPIRone HCl (BUSPAR) 30 MG tablet Take 30 mg by mouth 2 times daily       docosanol (ABREVA) 10 % CREA cream Apply topically 5 times daily Apply to cold sore on lip       FLUoxetine (PROZAC) 20 MG capsule Take 2 capsules (40 mg) by mouth daily 30 capsule 0     ketoconazole (NIZORAL) 2 % external shampoo Apply topically twice a week On Wed & Sun       levETIRAcetam (KEPPRA) 1000 MG tablet Take 1,000  mg by mouth 2 times daily Give with 250mg tab = 1250mg total dose twice daily       levETIRAcetam (KEPPRA) 250 MG tablet Take 250 mg by mouth 2 times daily Give with 100mg tab = 1250mg       loperamide (IMODIUM) 2 MG capsule TAKE 2 CAPSULES (4MG) BY MOUTH AFTER FIRST LOOSE STOOL, THEN;TAKE 1 CAPSULE AFTER CONSECUTIVE STOOLS, MAX 4 CAPS/24H       melatonin 1 MG TABS tablet Take 1 tablet (1 mg) by mouth nightly as needed for sleep       Methylphenidate HCl (RITALIN PO) Take 5 mg by mouth every morning       multivitamin, therapeutic (THERA-VIT) TABS tablet Take 1 tablet by mouth daily       ondansetron (ZOFRAN) 4 MG tablet Take 1 tablet (4 mg) by mouth every 8 hours as needed for nausea 30 tablet 3     pantoprazole (PROTONIX) 40 MG EC tablet Take 1 tablet (40 mg) by mouth 2 times daily (before meals) 30 tablet 0     rivaroxaban ANTICOAGULANT (XARELTO ANTICOAGULANT) 20 MG TABS tablet Take 1 tablet (20 mg) by mouth daily (with dinner) 30 tablet 3     DRUG ALLERGIES   Allergies   Allergen Reactions     Pilocarpine Headache and Nausea and Vomiting     Chlorhexidine Itching and Rash     Aripiprazole Headache and Other (See Comments)     Insomnia, nightmares     Bacitracin Rash     Bactroban is effective; No difficulties     Erythromycin      intolerant.     Meperidine Hcl      intolerant only   Demerol     Chloraprep One Step Rash       IMMUNIZATIONS   Immunization History   Administered Date(s) Administered     COVID-19 Vaccine 18+ (Moderna) 03/18/2021, 07/22/2021, 01/18/2022     Flu 65+ Years 09/28/2020     HepB 01/01/1990     Influenza (IIV3) PF 01/01/2001     Influenza Vaccine 65+ (Fluzone HD) 09/28/2020     Influenza Vaccine >6 months (Alfuria,Fluzone) 09/28/2020     Pneumo Conj 13-V (2010&after) 10/29/2014     Pneumococcal 23 valent 07/22/2020     TDAP Vaccine (Boostrix) 08/22/2016     Tetanus 06/13/2004     Zoster vaccine recombinant adjuvanted (SHINGRIX) 12/24/2019, 10/12/2020     Zoster vaccine, live 12/18/2008        ONCOLOGIC HISTORY  -2/2021 PRESENTATION: Progressive aphasia.   -2/19/2021 MR brain imaging with 3.3 x 2.8 x 2.8 cm enhancing mass in left frontal-parietal region. A second contrast enhancing lesion (0.5 x 1.0 x 1.0 cm) in right occipital lobe which is dural based and largely stable in size since 9/2011; likely representing a meningioma.  -2/23/2021 SURGERY: Gross total resection by Dr. Cummings  PATHOLOGY: Glioblastoma; IDH1-R132H wild-type/ IDH 1 and 2 wildtype, MGMT promoter methylated. Not BRAF mutated.   -3/23/2021 NEURO-ONC: Recommending chemoradiotherapy.   -3/2021 ADMISSION: SOB and chest pain; CT PA negative, but bilateral DVT noted on U/S. Started on Lovenox. Acute hypoxic respiratory failure in the setting of bilateral pneumonia; presumed PJP, concern for transfusion-related acute lung injury and right hemidiaphragm paralysis. Seizure. Right retroperitoneal hematoma. Ileus. Non-severe malnutrition on TPN with concern for refeeding syndrome. 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 Shock Liver.  -5/4 - 5/27/2021 RADS: 15 fractions.   -5/25/2021 NEURO-ONC/ DEVICE: Discussed the role of Optune; to be considered. Repeat MRI in 4 weeks with plans to start adjuvant temozolomide.   -6/22/2021 NEURO-ONC/ MRB/ CHEMO: Clinically improving. Imaging largely stable. Starting adjuvant temozolomide 150mg/m2 (250mg), cycle 1 (start date 6/24).   -7/20/2021 NEURO-ONC/ CHEMO: Clinically improving. Thrombocytopenia noted with adjuvant temozolomide dosing, platelets of 26K; will transition to metronomic dosing 50mg/m2 (100mg) daily to start once platelets are > 80K.    -8/17/2021 NEURO-ONC/ MRB/ CHEMO: Clinically improving. Saw Dr. Gamboa, who recommended starting anticoagulation; on Eliquis. Imaging with positive treatment response. Continue metronomic/ daily dosing at 50mg/m2 (100mg) through 12/2021.    -9/14/2021 NEURO-ONC/ CHEMO: Clinically improving. Continue metronomic/ daily dosing at 50mg/m2 (100mg) through 12/2021.     -10/12/2021 NEURO-ONC/ CHEMO: Clinically improving. Holding temozolomide and Zofran in the setting of severe constipation. Abdominal x-ray with high stool burden; consulted Dr. Hodge for management of constipation given history of ileus.   -10/28/2021 CHEMO: Temozolomide restarted.   -11/16/2021 NEURO-ONC/ MRB/ CHEMO: Clinically improving Less constipation, continued low appetite; referral to palliative care for mental health management. Referral to Dr. Agudelo to optimize rehab. Imaging with continued positive treatment response. Continue daily temozolomide.   -12/21/2021 NEURO-ONC/ CHEMO: Clinically improving in terms of appetite, energy. Stopping daily temozolomide at the end of the month. Discussion with Dr. Gamboa regarding removal of IVC filter.   -2/14/2022 MRB with a stable postoperative changes of left parietal craniotomy and tumor resection.   -3/1/2022 NEURO-ONC: Clinically deconditioned, mood is depressed; following with Cancer PM&R and Palliative Care. With imaging stable, continue imaging surveillance.   -4/4/2022 MRB with stable postoperative change of left-sided craniotomy and tumor resection.  -7/19/2022 NEURO-ONC/ MRB: Clinically improving. Imaging with no overt evidence of cancer recurrence, new punctate area of contrast enhancement of indeterminate significance; continue imaging surveillance.   -10/4/2022 NEURO-ONC/ MRB: Clinically with improved cognition. Recommend stopping ritalin in light of no known benefit and worsening tremor. Reestablish care with Dr. Agudelo. Imaging with no overt evidence of cancer recurrence, the punctate area of contrast enhancement nearly resolved; continue imaging surveillance.   -1/17/2023 NEURO-ONC/ MRB: Stable neurological deficits; recommending neuropsych evaluation to objectively assess cognition and capacity. Imaging with no overt evidence of cancer recurrence.      PHYSICAL EXAMINATION  /79 (BP Location: Left arm, Patient Position: Dangled, Cuff Size: Adult  "Regular)   Pulse 73   Temp 98.6  F (37  C) (Oral)   Resp 16   Wt 64.6 kg (142 lb 8 oz)   SpO2 94%   BMI 21.04 kg/m    Wt Readings from Last 2 Encounters:   01/17/23 64.6 kg (142 lb 6.7 oz)   01/17/23 64.6 kg (142 lb 8 oz)      Ht Readings from Last 2 Encounters:   06/03/22 1.753 m (5' 9\")   04/05/22 1.6 m (5' 3\")     KPS: 50-60  -Good energy today. Engaged in the conversation.   -Psychiatric: Good mood, reactive affect. Pleasant.  -Neurologic:   MENTAL STATUS:     Alert, oriented.   Speech fluent, but with some speech hesitation and does loose train of thought.   Could not remember name of cancer diagnosis; glioblastoma, or that she received chemotherapy as treatment.    Comprehension good, but with periodic confusion.      CRANIAL NERVES:     No masked facies.    Pupils are equal, round.     No facial droop.   Hearing slightly decreased bilaterally.   Normal phonation.   MOTOR: Mild bilateral pill rolling hand tremor; left >> right.    No rigidity/ cogwheeling.   GAIT: Sitting in a wheelchair, did not assess today.        MEDICAL RECORDS  RNCC notes  PM&R notes    LABS  Personally reviewed all available lab results; CBC and CMP.     IMAGING  Personally reviewed MR brain imaging from last week and compared to prior imaging. To my eye, there is no overt evidence of cancer recurrence about the left parietal resection cavity. The punctate focus of enhancement within the brain parenchyma deep and posterior to the resection cavity has remained stable since 4/2022 (below). There is no new concerning lesions.     Post-contrast from 4/2022, 1/2023 + perfusion.       Unchanged dural-based mass along the right occipital lobe, presumably meningioma.     Imaging was shown to and results were reviewed with Olga and her .       IMPRESSION  Clinic time for this high complexity encounter was spent discussing in detail the nature of her cancer in light of her recent imaging. This was in addition to answering " questions pertaining to my recommendations and devising the plan as outlined below.     Today, Olga's functional status remains stable in terms of her known neurological deficits. Fahad would like Olga to receive more intense therapy and have better meals and as a result, is considering transferring her to a difficult care facility. Olga has reestablished care with Dr. Agudelo and I have personally updated her on their concerns regarding therapy. On examination, I continue to not observe any clear clinical signs of Parkinson's Disease outside of the bilateral tremor.     Of note, on my neurological examination I do observe mild impairment in cognition. However, I believe that Olga maintains capacity for simple and straightforward decision making. I would greatly question her capacity for complex decision making regarding any legal or medical choices. My recommendation is for formal and objective neuropsychiatric evaluation to fully understand her cognitive ability and impairments plus to best assess her level of capacity. Dr. Agudelo is in agreement with this recommendation and will look to arrange this testing.     Imaging as detailed above with no overt evidence of cancer recurrence. I personally reviewed Olga's case and imaging at [facility] and all were in agreement with this impression and plan. The plan is to continue imaging surveillance per NCCN guidelines of every 3 months. In the meantime, Olga can call with questions or concerns or to report new complaints and can be seen sooner if needed.     Labs from today reviewed; no concern on CMP. CBC with Hb of 10.7. No need for continued lab monitoring from my standpoint and will defer to primary care.     PROBLEM LIST  Glioblastoma  Aphasia  Apraxia    PLAN  -CANCER DIRECTED THERAPY-  -Repeat imaging in 3 months.    -STEROIDS-  -Off dexamethasone.    -SEIZURE MANAGEMENT-  -Given history of seizures, antiepileptics are indicated.    -Continue Keppra.     -DVT-  -Following with Dr. Gamboa; currently on Eliquis.   -Requires lifelong anticoagulation given cancer diagnosis.   -Holding on removal of the IVC filter at this time.     -Quality of life/ MOOD/ FATIGUE-  -History of depressed mood, PTSD, poor motivation, and poor appetite.   -Previously followed with palliative care to assess mental health and manage medications.  -Cannot tolerate CPAP for MARCELLE.    -REHAB NEEDS-  -PT/ OT.   -Following with Dr. Agudelo in cancer rehab.    -COGNITIVE IMPAIRMENT-  -Mild impairment in cognition noted today.   -Olga maintains capacity for simple and straightforward decision making, but not for complex decision making regarding any legal or medical choices.   -Recommend assessment with neuropsychiatric evaluation.     Return to clinic in 4/2023 months + imaging.     Stephanie Baker MD  Neuro-oncology

## 2023-01-13 ENCOUNTER — LAB (OUTPATIENT)
Dept: LAB | Facility: CLINIC | Age: 78
End: 2023-01-13
Attending: PSYCHIATRY & NEUROLOGY
Payer: MEDICARE

## 2023-01-13 ENCOUNTER — HOSPITAL ENCOUNTER (OUTPATIENT)
Dept: MRI IMAGING | Facility: CLINIC | Age: 78
Discharge: HOME | End: 2023-01-13
Attending: PSYCHIATRY & NEUROLOGY
Payer: MEDICARE

## 2023-01-13 DIAGNOSIS — T45.1X5A CHEMOTHERAPY-INDUCED NAUSEA AND VOMITING: ICD-10-CM

## 2023-01-13 DIAGNOSIS — R11.2 CHEMOTHERAPY-INDUCED NAUSEA AND VOMITING: ICD-10-CM

## 2023-01-13 DIAGNOSIS — C71.9 GLIOBLASTOMA (H): ICD-10-CM

## 2023-01-13 LAB
ALBUMIN SERPL-MCNC: 3.6 G/DL (ref 3.4–5)
ALP SERPL-CCNC: 82 U/L (ref 40–150)
ALT SERPL W P-5'-P-CCNC: 20 U/L (ref 0–50)
ANION GAP SERPL CALCULATED.3IONS-SCNC: 6 MMOL/L (ref 3–14)
AST SERPL W P-5'-P-CCNC: 19 U/L (ref 0–45)
BASOPHILS # BLD AUTO: 0 10E3/UL (ref 0–0.2)
BASOPHILS NFR BLD AUTO: 1 %
BILIRUB SERPL-MCNC: 0.3 MG/DL (ref 0.2–1.3)
BUN SERPL-MCNC: 16 MG/DL (ref 7–30)
CALCIUM SERPL-MCNC: 9.1 MG/DL (ref 8.5–10.1)
CHLORIDE BLD-SCNC: 106 MMOL/L (ref 94–109)
CO2 SERPL-SCNC: 28 MMOL/L (ref 20–32)
CREAT SERPL-MCNC: 0.81 MG/DL (ref 0.52–1.04)
EOSINOPHIL # BLD AUTO: 0.2 10E3/UL (ref 0–0.7)
EOSINOPHIL NFR BLD AUTO: 3 %
ERYTHROCYTE [DISTWIDTH] IN BLOOD BY AUTOMATED COUNT: 12.5 % (ref 10–15)
GFR SERPL CREATININE-BSD FRML MDRD: 74 ML/MIN/1.73M2
GLUCOSE BLD-MCNC: 93 MG/DL (ref 70–99)
HCT VFR BLD AUTO: 31.9 % (ref 35–47)
HGB BLD-MCNC: 10.7 G/DL (ref 11.7–15.7)
IMM GRANULOCYTES # BLD: 0 10E3/UL
IMM GRANULOCYTES NFR BLD: 0 %
LYMPHOCYTES # BLD AUTO: 0.8 10E3/UL (ref 0.8–5.3)
LYMPHOCYTES NFR BLD AUTO: 11 %
MCH RBC QN AUTO: 31.1 PG (ref 26.5–33)
MCHC RBC AUTO-ENTMCNC: 33.5 G/DL (ref 31.5–36.5)
MCV RBC AUTO: 93 FL (ref 78–100)
MONOCYTES # BLD AUTO: 0.7 10E3/UL (ref 0–1.3)
MONOCYTES NFR BLD AUTO: 9 %
NEUTROPHILS # BLD AUTO: 5.6 10E3/UL (ref 1.6–8.3)
NEUTROPHILS NFR BLD AUTO: 76 %
NRBC # BLD AUTO: 0 10E3/UL
NRBC BLD AUTO-RTO: 0 /100
PLATELET # BLD AUTO: 176 10E3/UL (ref 150–450)
POTASSIUM BLD-SCNC: 3.9 MMOL/L (ref 3.4–5.3)
PROT SERPL-MCNC: 6.8 G/DL (ref 6.8–8.8)
RBC # BLD AUTO: 3.44 10E6/UL (ref 3.8–5.2)
SODIUM SERPL-SCNC: 140 MMOL/L (ref 133–144)
WBC # BLD AUTO: 7.4 10E3/UL (ref 4–11)

## 2023-01-13 PROCEDURE — 85025 COMPLETE CBC W/AUTO DIFF WBC: CPT

## 2023-01-13 PROCEDURE — 80053 COMPREHEN METABOLIC PANEL: CPT

## 2023-01-13 PROCEDURE — 255N000002 HC RX 255 OP 636: Performed by: PSYCHIATRY & NEUROLOGY

## 2023-01-13 PROCEDURE — 70553 MRI BRAIN STEM W/O & W/DYE: CPT

## 2023-01-13 PROCEDURE — 36415 COLL VENOUS BLD VENIPUNCTURE: CPT

## 2023-01-13 PROCEDURE — A9585 GADOBUTROL INJECTION: HCPCS | Performed by: PSYCHIATRY & NEUROLOGY

## 2023-01-13 RX ORDER — GADOBUTROL 604.72 MG/ML
15 INJECTION INTRAVENOUS ONCE
Status: COMPLETED | OUTPATIENT
Start: 2023-01-13 | End: 2023-01-13

## 2023-01-13 RX ADMIN — GADOBUTROL 15 ML: 604.72 INJECTION INTRAVENOUS at 18:10

## 2023-01-16 ENCOUNTER — TUMOR CONFERENCE (OUTPATIENT)
Dept: ONCOLOGY | Facility: CLINIC | Age: 78
End: 2023-01-16
Payer: MEDICARE

## 2023-01-17 ENCOUNTER — ONCOLOGY VISIT (OUTPATIENT)
Dept: ONCOLOGY | Facility: CLINIC | Age: 78
End: 2023-01-17
Attending: STUDENT IN AN ORGANIZED HEALTH CARE EDUCATION/TRAINING PROGRAM
Payer: MEDICARE

## 2023-01-17 ENCOUNTER — ONCOLOGY VISIT (OUTPATIENT)
Dept: ONCOLOGY | Facility: CLINIC | Age: 78
End: 2023-01-17
Attending: PSYCHIATRY & NEUROLOGY
Payer: MEDICARE

## 2023-01-17 VITALS
WEIGHT: 142.5 LBS | RESPIRATION RATE: 16 BRPM | SYSTOLIC BLOOD PRESSURE: 131 MMHG | BODY MASS INDEX: 21.04 KG/M2 | DIASTOLIC BLOOD PRESSURE: 79 MMHG | TEMPERATURE: 98.6 F | HEART RATE: 73 BPM | OXYGEN SATURATION: 94 %

## 2023-01-17 VITALS
OXYGEN SATURATION: 94 % | DIASTOLIC BLOOD PRESSURE: 79 MMHG | SYSTOLIC BLOOD PRESSURE: 131 MMHG | WEIGHT: 142.42 LBS | HEART RATE: 73 BPM | RESPIRATION RATE: 16 BRPM | TEMPERATURE: 98.6 F | BODY MASS INDEX: 21.03 KG/M2

## 2023-01-17 DIAGNOSIS — R53.0 NEOPLASTIC MALIGNANT RELATED FATIGUE: ICD-10-CM

## 2023-01-17 DIAGNOSIS — R53.81 PHYSICAL DECONDITIONING: ICD-10-CM

## 2023-01-17 DIAGNOSIS — C71.9 GLIOBLASTOMA (H): Primary | ICD-10-CM

## 2023-01-17 DIAGNOSIS — R26.81 GAIT INSTABILITY: ICD-10-CM

## 2023-01-17 DIAGNOSIS — R41.89 COGNITIVE CHANGE: ICD-10-CM

## 2023-01-17 DIAGNOSIS — R41.3 SHORT-TERM MEMORY LOSS: ICD-10-CM

## 2023-01-17 PROCEDURE — G0463 HOSPITAL OUTPT CLINIC VISIT: HCPCS | Mod: 27 | Performed by: PSYCHIATRY & NEUROLOGY

## 2023-01-17 PROCEDURE — 99215 OFFICE O/P EST HI 40 MIN: CPT | Performed by: STUDENT IN AN ORGANIZED HEALTH CARE EDUCATION/TRAINING PROGRAM

## 2023-01-17 PROCEDURE — G0463 HOSPITAL OUTPT CLINIC VISIT: HCPCS | Performed by: STUDENT IN AN ORGANIZED HEALTH CARE EDUCATION/TRAINING PROGRAM

## 2023-01-17 PROCEDURE — 99214 OFFICE O/P EST MOD 30 MIN: CPT | Performed by: PSYCHIATRY & NEUROLOGY

## 2023-01-17 ASSESSMENT — PAIN SCALES - GENERAL
PAINLEVEL: NO PAIN (0)
PAINLEVEL: NO PAIN (0)

## 2023-01-17 NOTE — PROGRESS NOTES
"Oncology Rooming Note    January 17, 2023 11:27 AM   Olga Bailey is a 77 year old female who presents for:    Chief Complaint   Patient presents with     Oncology Clinic Visit     Initial Vitals: /79   Pulse 73   Temp 98.6  F (37  C) (Oral)   Resp 16   Wt 64.6 kg (142 lb 6.7 oz)   SpO2 94%   BMI 21.03 kg/m   Estimated body mass index is 21.03 kg/m  as calculated from the following:    Height as of 6/3/22: 1.753 m (5' 9\").    Weight as of this encounter: 64.6 kg (142 lb 6.7 oz). Body surface area is 1.77 meters squared.  No Pain (0) Comment: Data Unavailable   No LMP recorded. Patient has had a hysterectomy.  Allergies reviewed: Yes  Medications reviewed: Yes    Medications: Medication refills not needed today.  Pharmacy name entered into EPIC: Data Unavailable    Clinical concerns: Pt would like to know how she can progress? Pt's  is wondering if she still needs to be on Ritalin?   Dr. Agudelo was notified.      Shari J. Schoenberger, [facility]            "

## 2023-01-17 NOTE — PROGRESS NOTES
[facility]   PM&R clinic note        Interval history:     Olga Bailey presents to clinic today for follow up reg her rehab needs.   She has h/o left frontoparietal glioblastoma (IDH wild-type, MGMT promoter methylated).  Was last seen in clinic on 10/18/22.  Recommendations included:  1.           Therapy/equipment/braces:  2. A physical therapy referral was placed to her current facility to initiate more aggressive physical therapy regimen in an view of her clinical improvement and functional potential.  3. A speech therapy referral was placed to help with cognitive rehabilitation and short-term memory exercises to maximize benefit.  4. Start 1-2 protein shakes daily for supplementation.  Can try Premier protein or [employer] fair life protein shakes.  2.           Follow up: 2-3 months.      Oncologic History:    2/2021 PRESENTATION: Progressive aphasia.     2/19/2021 MR brain imaging with 3.3 x 2.8 x 2.8 cm enhancing mass in left frontal-parietal region. A second contrast enhancing lesion (0.5 x 1.0 x 1.0 cm) in right occipital lobe which is dural based and largely stable in size since 9/2011; likely representing a meningioma.    2/23/2021 SURGERY: Gross total resection by Dr. Cummings    PATHOLOGY: Glioblastoma; IDH1-R132H wild-type/ IDH 1 and 2 wildtype, MGMT promoter methylated. Not BRAF mutated.     3/23/2021 NEURO-ONC: Recommending chemoradiotherapy.     3/2021 ADMISSION: SOB and chest pain; CT PA negative, but bilateral DVT noted on U/S. Started on Lovenox. Acute hypoxic respiratory failure in the setting of bilateral pneumonia; presumed PJP, concern for transfusion-related acute lung injury and right hemidiaphragm paralysis. Seizure. Right retroperitoneal hematoma. Ileus. Non-severe malnutrition on TPN with concern for refeeding syndrome. 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 Shock Liver.    5/4 - 5/27/2021 RADS: 15 fractions.     5/25/2021 NEURO-ONC/ DEVICE: Discussed  the role of Optune; to be considered. Repeat MRI in 4 weeks with plans to start adjuvant temozolomide.     6/22/2021 NEURO-ONC/ MRB/ CHEMO: Clinically improving. Imaging largely stable. Starting adjuvant temozolomide 150mg/m2 (250mg), cycle 1 (start date 6/24).     7/20/2021 NEURO-ONC/ CHEMO: Clinically improving. Thrombocytopenia noted with adjuvant temozolomide dosing, platelets of 26K; will transition to metronomic dosing 50mg/m2 (100mg) daily to start once platelets are > 80K.      8/17/2021 NEURO-ONC/ MRB/ CHEMO: Clinically improving. Saw Dr. Gamboa, who recommended starting anticoagulation; on Eliquis. Imaging with positive treatment response. Continue metronomic/ daily dosing at 50mg/m2 (100mg) through 12/2021.      9/14/2021 NEURO-ONC/ CHEMO: Clinically improving. Continue metronomic/ daily dosing at 50mg/m2 (100mg) through 12/2021.      10/12/2021 NEURO-ONC/ CHEMO: Clinically improving. Holding temozolomide and Zofran in the setting of severe constipation. Abdominal x-ray with high stool burden; consulted Dr. Hodge for management of constipation given history of ileus.     10/28/2021 CHEMO: Temozolomide restarted.     11/16/2021 NEURO-ONC/ MRB/ CHEMO: Clinically improving Less constipation, continued low appetite; referral to palliative care for mental health management. Referral to Dr. Agudelo to optimize rehab. Imaging with continued positive treatment response. Continue daily temozolomide.     3/1/2022 follow-up with Dr. Gay-patient doing overall okay.  Sleep and energy are better.  Following with palliative care and has Prozac and BuSpar, and mood is better.  Home therapies had stopped about 4 weeks ago, and interested in restarting.  She has had recent fall due to instability in her gait when she presented to the ER and imaging did not reveal any head injury or other injuries that were sustained.  Also presented to the ER as was leaning to the right, and there was concern for stroke however stroke  work-up was negative.    Follow-up visit with Dr. Baker on 10/4/22.  Clinically improved cognition.  Recommended stopping Ritalin in light of no known benefit and worsening tremor.  Reestablish care with Dr. Agudelo.  Imaging with no overt evidence of cancer recurrence, the punctate area of contrast-enhancement nearly resolved.  Continue imaging surveillance.    1/17/23- Visit with Dr. Baker. Clinically with stable neurological deficits. Imaging with no overt evidence of cancer recurrence.      Symptoms,  Patient presents for a return visit.  Her  is present for the visit.  Overall, her imaging has demonstrated no overt evidence of cancer recurrence at today's visit.  Neurologically she is stable when compared to her previous visit.  She even notes some improvement in her overall strength since her last visit.  She has continued at her current facility, however her  indicates that there are many concerns with that facility, and she will be transitioning next week most likely to a new facility that they have already visited and has a room.  She has been participating with physical therapy at the facility, however they feel that she has plateaued and are discharging her soon.  There have been no falls since her last visit or no near falls as well.  Olga has been able to ambulate with her walker at the facility.  In terms of her nutrition, it has been pretty good however her  states that she is not getting much for protein content in her meals.  In terms of her cognition, she continues to complain of short-term memory deficits that are intermittent.  Her  indicates that she seems very sharp at times when he has interactions with her and they are doing activities together.  She has not had neurocognitive testing for a while, and this may be of benefit as discussed at today's visit.      Therapies/HEP,  Currently participating in physical therapy through her facility, however will be discharged  soon.      Functionally,   Stable since last visit.      Social history is unchanged.      Medications:  Current Outpatient Medications   Medication Sig Dispense Refill     acetaminophen (TYLENOL) 500 MG tablet Take 500 mg by mouth every 4 hours as needed for mild pain        albuterol (PROAIR HFA/PROVENTIL HFA/VENTOLIN HFA) 108 (90 Base) MCG/ACT inhaler Inhale 2 puffs into the lungs every 4 hours as needed for wheezing 18 g 3     albuterol (PROVENTIL) (2.5 MG/3ML) 0.083% neb solution Take 1 vial (2.5 mg) by nebulization every 4 hours as needed for wheezing or shortness of breath / dyspnea       amLODIPine (NORVASC) 5 MG tablet Take 1 tablet (5 mg) by mouth daily (Patient taking differently: Take 5 mg by mouth daily Hold if SBP >160 or DBP <90)       busPIRone HCl (BUSPAR) 30 MG tablet Take 30 mg by mouth 2 times daily       docosanol (ABREVA) 10 % CREA cream Apply topically 5 times daily Apply to cold sore on lip       FLUoxetine (PROZAC) 20 MG capsule Take 2 capsules (40 mg) by mouth daily 30 capsule 0     ketoconazole (NIZORAL) 2 % external shampoo Apply topically twice a week On Wed & Sun       levETIRAcetam (KEPPRA) 1000 MG tablet Take 1,000 mg by mouth 2 times daily Give with 250mg tab = 1250mg total dose twice daily       levETIRAcetam (KEPPRA) 250 MG tablet Take 250 mg by mouth 2 times daily Give with 100mg tab = 1250mg       loperamide (IMODIUM) 2 MG capsule TAKE 2 CAPSULES (4MG) BY MOUTH AFTER FIRST LOOSE STOOL, THEN;TAKE 1 CAPSULE AFTER CONSECUTIVE STOOLS, MAX 4 CAPS/24H       melatonin 1 MG TABS tablet Take 1 tablet (1 mg) by mouth nightly as needed for sleep       Methylphenidate HCl (RITALIN PO) Take 5 mg by mouth every morning       multivitamin, therapeutic (THERA-VIT) TABS tablet Take 1 tablet by mouth daily       ondansetron (ZOFRAN) 4 MG tablet Take 1 tablet (4 mg) by mouth every 8 hours as needed for nausea 30 tablet 3     pantoprazole (PROTONIX) 40 MG EC tablet Take 1 tablet (40 mg) by mouth 2  times daily (before meals) 30 tablet 0     rivaroxaban ANTICOAGULANT (XARELTO ANTICOAGULANT) 20 MG TABS tablet Take 1 tablet (20 mg) by mouth daily (with dinner) 30 tablet 3              Physical Exam:   /79   Pulse 73   Temp 98.6  F (37  C) (Oral)   Resp 16   Wt 64.6 kg (142 lb 6.7 oz)   SpO2 94%   BMI 21.03 kg/m    Gen: NAD, pleasant and cooperative   HEENT: Normocephalic  Cardio: Normal sinus rhythm, S1 and S2, No murmurs or gallops  Pulm: non-labored breathing in room air, clear to auscultation anteriorly.  No cough.  Abd: benign  Ext: WWP, no edema in BLE, no tenderness in calves.  Neuro/MSK:   Appearance: Left shoulder is elevated higher than right shoulder  ROM: AROM right shoulder abduction 110 degrees, PROM 170 degrees  Strength: Right shoulder abduction 3/5, Right hip flexion 4/5.  All other manual muscle testing 5/5.  Sensation: Symmetric to light touch bilaterally.  Reflexes:  No clonus.    Labs/Imaging:  Lab Results   Component Value Date    WBC 7.4 01/13/2023    HGB 10.7 (L) 01/13/2023    HCT 31.9 (L) 01/13/2023    MCV 93 01/13/2023     01/13/2023     Lab Results   Component Value Date     01/13/2023    POTASSIUM 3.9 01/13/2023    CHLORIDE 106 01/13/2023    CO2 28 01/13/2023    GLC 93 01/13/2023     Lab Results   Component Value Date    GFRESTIMATED 74 01/13/2023    GFRESTBLACK >90 07/09/2021     Lab Results   Component Value Date    AST 19 01/13/2023    ALT 20 01/13/2023    ALKPHOS 82 01/13/2023    BILITOTAL 0.3 01/13/2023     Lab Results   Component Value Date    INR 1.44 (H) 02/14/2022     Lab Results   Component Value Date    BUN 16 01/13/2023    CR 0.81 01/13/2023              Assessment/Plan   Olga Bailey presents to clinic today for follow up reg her rehab needs. She has h/o left frontoparietal glioblastoma (IDH wild-type, MGMT promoter methylated). Was last seen in clinic on 10/18/22.  As discussed with her client and her , with her ongoing short-term  memory deficits and cognitive difficulties, recommendation is to repeat neuropsych testing with Dr. Lantigua.  They are in agreement with this, so this referral order was placed today.  This will also help determination of whether the patient may benefit from cognitive rehabilitation again.  Her  did have quite a few concerns with her current living facility including decreased activity level, poor quality food and some concerns with the staff.  So he has plans to move her to a different facility in a week per his history. We discussed a follow up visit in 3 months to help with ongoing rehabilitation needs. Patient and her  are in agreement with this plan.      1.           Therapy/equipment/braces:  1. Continue physical therapy at current facility to initiate more aggressive physical therapy regimen in an view of her clinical improvement and functional potential.  2. Continue protein supplementation daily as tolerated.  3.    Neuropsychology referral was placed with Dr. Lantigua for repeat Neuropsych testing.   4.   Family will reach out once she is moved to a new facility so we can communicate with physical therapy team there/place the appropriate recommendations as needed.  2.           Follow up: 3 months.      Yesenia Agudelo MD  Physical Medicine & Rehabilitation      50 minutes spent on the date of the encounter doing chart review, history and exam, documentation and further activities as noted above.

## 2023-01-17 NOTE — PATIENT INSTRUCTIONS
Continue physical therapy at your current facility.  Continue protein supplementation as you can at your facility.  A neuropsychology referral was placed to reassess your cognitive status.  As discussed, you will reach out to Dr. Agudelo for rehabilitation recommendations once you move to your new facility.  Follow up with Dr. Agudelo in 3 months.

## 2023-01-17 NOTE — LETTER
1/17/2023         RE: Olga Bailey  [address]  [facility] [address]        Dear Colleague,    Thank you for referring your patient, Olga Bailey, to the [facility]. Please see a copy of my visit note below.    NEURO-ONCOLOGY VISIT  Jan 17, 2023    CHIEF COMPLAINT: Ms. Olga Bailey is a 77 year old right-handed woman with a left frontoparietal glioblastoma (IDH wild-type, MGMT promoter methylated), diagnosed following gross total resection on 2/23/2021. Initiation of upfront cancer-directed treatment was delayed due to a complicated hospitalization. Given a resolving infection and lowered functional status, radiation alone was initiated on 5/4/2021 and completed on 5/27/2021.     Olga started adjuvant therapy with 150mg/m2 dosing of temozolomide, however, cycle 1 was complicated by significant hematologic toxicity. Dosing was changed to metronomic/ daily dosing in 7/2021 and this was well tolerated. Imaging from 8/2021 and 11/2021 demonstrates positive treatment effect.    Chemotherapy was placed on a hold in 10/2021 in the setting of severe constipation, but then, resumed. She has since completed the planned 6 months of adjuvant temozolomide as of 12/2021.    Imaging since 2/2022 has been without concern for cancer recurrence, however, imaging in 7/2022 showed a punctate area of contrast enhancement of indeterminate significance. Repeat imaging in 10/2022 showed near resolution of that punctate lesion and no evidence of cancer recurrence. Imaging in 1/2023 was again without concern. The plan remains to continue on imaging surveillance.     Olga is presenting in follow-up accompanied by Fahad ().    HISTORY OF PRESENT ILLNESS  -Olga is fairly well today. Fahad agree.  -Walks with PT. Using a walker, but most of the time in the wheelchair. Strength she feels is at baseline, but Fahad feels that her strength is improving.   -Cognitively doing well;  excellent Scrabble player per Fahad.   -Good energy.  -Mood is good.   -No headaches.  -No recurrent seizures since her last visit, tolerating Keppra.  -On anticoagulation with no significant signs/ symptoms of bleeding.      MEDICATIONS   Current Outpatient Medications   Medication     acetaminophen (TYLENOL) 500 MG tablet     albuterol (PROAIR HFA/PROVENTIL HFA/VENTOLIN HFA) 108 (90 Base) MCG/ACT inhaler     albuterol (PROVENTIL) (2.5 MG/3ML) 0.083% neb solution     amLODIPine (NORVASC) 5 MG tablet     busPIRone HCl (BUSPAR) 30 MG tablet     docosanol (ABREVA) 10 % CREA cream     FLUoxetine (PROZAC) 20 MG capsule     ketoconazole (NIZORAL) 2 % external shampoo     levETIRAcetam (KEPPRA) 1000 MG tablet     levETIRAcetam (KEPPRA) 250 MG tablet     loperamide (IMODIUM) 2 MG capsule     melatonin 1 MG TABS tablet     Methylphenidate HCl (RITALIN PO)     multivitamin, therapeutic (THERA-VIT) TABS tablet     ondansetron (ZOFRAN) 4 MG tablet     pantoprazole (PROTONIX) 40 MG EC tablet     rivaroxaban ANTICOAGULANT (XARELTO ANTICOAGULANT) 20 MG TABS tablet     No current facility-administered medications for this visit.     Current Outpatient Medications   Medication Sig Dispense Refill     acetaminophen (TYLENOL) 500 MG tablet Take 500 mg by mouth every 4 hours as needed for mild pain        albuterol (PROAIR HFA/PROVENTIL HFA/VENTOLIN HFA) 108 (90 Base) MCG/ACT inhaler Inhale 2 puffs into the lungs every 4 hours as needed for wheezing 18 g 3     albuterol (PROVENTIL) (2.5 MG/3ML) 0.083% neb solution Take 1 vial (2.5 mg) by nebulization every 4 hours as needed for wheezing or shortness of breath / dyspnea       amLODIPine (NORVASC) 5 MG tablet Take 1 tablet (5 mg) by mouth daily (Patient taking differently: Take 5 mg by mouth daily Hold if SBP >160 or DBP <90)       busPIRone HCl (BUSPAR) 30 MG tablet Take 30 mg by mouth 2 times daily       docosanol (ABREVA) 10 % CREA cream Apply topically 5 times daily Apply to cold  sore on lip       FLUoxetine (PROZAC) 20 MG capsule Take 2 capsules (40 mg) by mouth daily 30 capsule 0     ketoconazole (NIZORAL) 2 % external shampoo Apply topically twice a week On Wed & Sun       levETIRAcetam (KEPPRA) 1000 MG tablet Take 1,000 mg by mouth 2 times daily Give with 250mg tab = 1250mg total dose twice daily       levETIRAcetam (KEPPRA) 250 MG tablet Take 250 mg by mouth 2 times daily Give with 100mg tab = 1250mg       loperamide (IMODIUM) 2 MG capsule TAKE 2 CAPSULES (4MG) BY MOUTH AFTER FIRST LOOSE STOOL, THEN;TAKE 1 CAPSULE AFTER CONSECUTIVE STOOLS, MAX 4 CAPS/24H       melatonin 1 MG TABS tablet Take 1 tablet (1 mg) by mouth nightly as needed for sleep       Methylphenidate HCl (RITALIN PO) Take 5 mg by mouth every morning       multivitamin, therapeutic (THERA-VIT) TABS tablet Take 1 tablet by mouth daily       ondansetron (ZOFRAN) 4 MG tablet Take 1 tablet (4 mg) by mouth every 8 hours as needed for nausea 30 tablet 3     pantoprazole (PROTONIX) 40 MG EC tablet Take 1 tablet (40 mg) by mouth 2 times daily (before meals) 30 tablet 0     rivaroxaban ANTICOAGULANT (XARELTO ANTICOAGULANT) 20 MG TABS tablet Take 1 tablet (20 mg) by mouth daily (with dinner) 30 tablet 3     DRUG ALLERGIES   Allergies   Allergen Reactions     Pilocarpine Headache and Nausea and Vomiting     Chlorhexidine Itching and Rash     Aripiprazole Headache and Other (See Comments)     Insomnia, nightmares     Bacitracin Rash     Bactroban is effective; No difficulties     Erythromycin      intolerant.     Meperidine Hcl      intolerant only   Demerol     Chloraprep One Step Rash       IMMUNIZATIONS   Immunization History   Administered Date(s) Administered     COVID-19 Vaccine 18+ (Moderna) 03/18/2021, 07/22/2021, 01/18/2022     Flu 65+ Years 09/28/2020     HepB 01/01/1990     Influenza (IIV3) PF 01/01/2001     Influenza Vaccine 65+ (Fluzone HD) 09/28/2020     Influenza Vaccine >6 months (Alfuria,Fluzone) 09/28/2020     Pneumo  Conj 13-V (2010&after) 10/29/2014     Pneumococcal 23 valent 07/22/2020     TDAP Vaccine (Boostrix) 08/22/2016     Tetanus 06/13/2004     Zoster vaccine recombinant adjuvanted (SHINGRIX) 12/24/2019, 10/12/2020     Zoster vaccine, live 12/18/2008       ONCOLOGIC HISTORY  -2/2021 PRESENTATION: Progressive aphasia.   -2/19/2021 MR brain imaging with 3.3 x 2.8 x 2.8 cm enhancing mass in left frontal-parietal region. A second contrast enhancing lesion (0.5 x 1.0 x 1.0 cm) in right occipital lobe which is dural based and largely stable in size since 9/2011; likely representing a meningioma.  -2/23/2021 SURGERY: Gross total resection by Dr. Cummings  PATHOLOGY: Glioblastoma; IDH1-R132H wild-type/ IDH 1 and 2 wildtype, MGMT promoter methylated. Not BRAF mutated.   -3/23/2021 NEURO-ONC: Recommending chemoradiotherapy.   -3/2021 ADMISSION: SOB and chest pain; CT PA negative, but bilateral DVT noted on U/S. Started on Lovenox. Acute hypoxic respiratory failure in the setting of bilateral pneumonia; presumed PJP, concern for transfusion-related acute lung injury and right hemidiaphragm paralysis. Seizure. Right retroperitoneal hematoma. Ileus. Non-severe malnutrition on TPN with concern for refeeding syndrome. 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 Shock Liver.  -5/4 - 5/27/2021 RADS: 15 fractions.   -5/25/2021 NEURO-ONC/ DEVICE: Discussed the role of Optune; to be considered. Repeat MRI in 4 weeks with plans to start adjuvant temozolomide.   -6/22/2021 NEURO-ONC/ MRB/ CHEMO: Clinically improving. Imaging largely stable. Starting adjuvant temozolomide 150mg/m2 (250mg), cycle 1 (start date 6/24).   -7/20/2021 NEURO-ONC/ CHEMO: Clinically improving. Thrombocytopenia noted with adjuvant temozolomide dosing, platelets of 26K; will transition to metronomic dosing 50mg/m2 (100mg) daily to start once platelets are > 80K.    -8/17/2021 NEURO-ONC/ MRB/ CHEMO: Clinically improving. Saw Dr. Gamboa, who recommended starting anticoagulation; on  Eliquis. Imaging with positive treatment response. Continue metronomic/ daily dosing at 50mg/m2 (100mg) through 12/2021.    -9/14/2021 NEURO-ONC/ CHEMO: Clinically improving. Continue metronomic/ daily dosing at 50mg/m2 (100mg) through 12/2021.    -10/12/2021 NEURO-ONC/ CHEMO: Clinically improving. Holding temozolomide and Zofran in the setting of severe constipation. Abdominal x-ray with high stool burden; consulted Dr. Hodge for management of constipation given history of ileus.   -10/28/2021 CHEMO: Temozolomide restarted.   -11/16/2021 NEURO-ONC/ MRB/ CHEMO: Clinically improving Less constipation, continued low appetite; referral to palliative care for mental health management. Referral to Dr. Agudelo to optimize rehab. Imaging with continued positive treatment response. Continue daily temozolomide.   -12/21/2021 NEURO-ONC/ CHEMO: Clinically improving in terms of appetite, energy. Stopping daily temozolomide at the end of the month. Discussion with Dr. Gamboa regarding removal of IVC filter.   -2/14/2022 MRB with a stable postoperative changes of left parietal craniotomy and tumor resection.   -3/1/2022 NEURO-ONC: Clinically deconditioned, mood is depressed; following with Cancer PM&R and Palliative Care. With imaging stable, continue imaging surveillance.   -4/4/2022 MRB with stable postoperative change of left-sided craniotomy and tumor resection.  -7/19/2022 NEURO-ONC/ MRB: Clinically improving. Imaging with no overt evidence of cancer recurrence, new punctate area of contrast enhancement of indeterminate significance; continue imaging surveillance.   -10/4/2022 NEURO-ONC/ MRB: Clinically with improved cognition. Recommend stopping ritalin in light of no known benefit and worsening tremor. Reestablish care with Dr. Agudelo. Imaging with no overt evidence of cancer recurrence, the punctate area of contrast enhancement nearly resolved; continue imaging surveillance.   -1/17/2023 NEURO-ONC/ MRB: Stable neurological  "deficits; recommending neuropsych evaluation to objectively assess cognition and capacity. Imaging with no overt evidence of cancer recurrence.      PHYSICAL EXAMINATION  /79 (BP Location: Left arm, Patient Position: Dangled, Cuff Size: Adult Regular)   Pulse 73   Temp 98.6  F (37  C) (Oral)   Resp 16   Wt 64.6 kg (142 lb 8 oz)   SpO2 94%   BMI 21.04 kg/m    Wt Readings from Last 2 Encounters:   01/17/23 64.6 kg (142 lb 6.7 oz)   01/17/23 64.6 kg (142 lb 8 oz)      Ht Readings from Last 2 Encounters:   06/03/22 1.753 m (5' 9\")   04/05/22 1.6 m (5' 3\")     KPS: 50-60  -Good energy today. Engaged in the conversation.   -Psychiatric: Good mood, reactive affect. Pleasant.  -Neurologic:   MENTAL STATUS:     Alert, oriented.   Speech fluent, but with some speech hesitation and does loose train of thought.   Could not remember name of cancer diagnosis; glioblastoma, or that she received chemotherapy as treatment.    Comprehension good, but with periodic confusion.      CRANIAL NERVES:     No masked facies.    Pupils are equal, round.     No facial droop.   Hearing slightly decreased bilaterally.   Normal phonation.   MOTOR: Mild bilateral pill rolling hand tremor; left >> right.    No rigidity/ cogwheeling.   GAIT: Sitting in a wheelchair, did not assess today.        MEDICAL RECORDS  RNCC notes  PM&R notes    LABS  Personally reviewed all available lab results; CBC and CMP.     IMAGING  Personally reviewed MR brain imaging from last week and compared to prior imaging. To my eye, there is no overt evidence of cancer recurrence about the left parietal resection cavity. The punctate focus of enhancement within the brain parenchyma deep and posterior to the resection cavity has remained stable since 4/2022 (below). There is no new concerning lesions.     Post-contrast from 4/2022, 1/2023 + perfusion.       Unchanged dural-based mass along the right occipital lobe, presumably meningioma.     Imaging was shown to " and results were reviewed with Olga and her .       IMPRESSION  Clinic time for this high complexity encounter was spent discussing in detail the nature of her cancer in light of her recent imaging. This was in addition to answering questions pertaining to my recommendations and devising the plan as outlined below.     Today, Olga's functional status remains stable in terms of her known neurological deficits. Fahad would like Olga to receive more intense therapy and have better meals and as a result, is considering transferring her to a difficult care facility. Olga has reestablished care with Dr. Agudelo and I have personally updated her on their concerns regarding therapy. On examination, I continue to not observe any clear clinical signs of Parkinson's Disease outside of the bilateral tremor.     Of note, on my neurological examination I do observe mild impairment in cognition. However, I believe that Olga maintains capacity for simple and straightforward decision making. I would greatly question her capacity for complex decision making regarding any legal or medical choices. My recommendation is for formal and objective neuropsychiatric evaluation to fully understand her cognitive ability and impairments plus to best assess her level of capacity. Dr. Agudelo is in agreement with this recommendation and will look to arrange this testing.     Imaging as detailed above with no overt evidence of cancer recurrence. I personally reviewed Olga's case and imaging at [facility] Conference and all were in agreement with this impression and plan. The plan is to continue imaging surveillance per NCCN guidelines of every 3 months. In the meantime, Olga can call with questions or concerns or to report new complaints and can be seen sooner if needed.     Labs from today reviewed; no concern on CMP. CBC with Hb of 10.7. No need for continued lab monitoring from my standpoint and will defer to  primary care.     PROBLEM LIST  Glioblastoma  Aphasia  Apraxia    PLAN  -CANCER DIRECTED THERAPY-  -Repeat imaging in 3 months.    -STEROIDS-  -Off dexamethasone.    -SEIZURE MANAGEMENT-  -Given history of seizures, antiepileptics are indicated.   -Continue Keppra.     -DVT-  -Following with Dr. Gamboa; currently on Eliquis.   -Requires lifelong anticoagulation given cancer diagnosis.   -Holding on removal of the IVC filter at this time.     -Quality of life/ MOOD/ FATIGUE-  -History of depressed mood, PTSD, poor motivation, and poor appetite.   -Previously followed with palliative care to assess mental health and manage medications.  -Cannot tolerate CPAP for MARCELLE.    -REHAB NEEDS-  -PT/ OT.   -Following with Dr. Agudelo in cancer rehab.    -COGNITIVE IMPAIRMENT-  -Mild impairment in cognition noted today.   -Olga maintains capacity for simple and straightforward decision making, but not for complex decision making regarding any legal or medical choices.   -Recommend assessment with neuropsychiatric evaluation.     Return to clinic in 4/2023 months + imaging.     Stephanie Baker MD  Neuro-oncology        Again, thank you for allowing me to participate in the care of your patient.        Sincerely,        Stephanie Baker MD

## 2023-01-17 NOTE — LETTER
"    1/17/2023         RE: Olga Bailey  [address]  [facility] [address]        Dear Colleague,    Thank you for referring your patient, Olga Bailey, to the [facility]. Please see a copy of my visit note below.    Oncology Rooming Note    January 17, 2023 11:27 AM   Olga Bailey is a 77 year old female who presents for:    Chief Complaint   Patient presents with     Oncology Clinic Visit     Initial Vitals: /79   Pulse 73   Temp 98.6  F (37  C) (Oral)   Resp 16   Wt 64.6 kg (142 lb 6.7 oz)   SpO2 94%   BMI 21.03 kg/m   Estimated body mass index is 21.03 kg/m  as calculated from the following:    Height as of 6/3/22: 1.753 m (5' 9\").    Weight as of this encounter: 64.6 kg (142 lb 6.7 oz). Body surface area is 1.77 meters squared.  No Pain (0) Comment: Data Unavailable   No LMP recorded. Patient has had a hysterectomy.  Allergies reviewed: Yes  Medications reviewed: Yes    Medications: Medication refills not needed today.  Pharmacy name entered into EPIC: Data Unavailable    Clinical concerns: Pt would like to know how she can progress? Pt's  is wondering if she still needs to be on Ritalin?   Dr. Agudelo was notified.      Shari J. Schoenberger, [facility]   PM&R clinic note        Interval history:     Olga Bailey presents to clinic today for follow up reg her rehab needs.   She has h/o left frontoparietal glioblastoma (IDH wild-type, MGMT promoter methylated).  Was last seen in clinic on 10/18/22.  Recommendations included:  1.           Therapy/equipment/braces:  2. A physical therapy referral was placed to her current facility to initiate more aggressive physical therapy regimen in an view of her clinical improvement and functional potential.  3. A speech therapy referral was placed to help with cognitive rehabilitation and short-term memory exercises to maximize benefit.  4. Start 1-2 protein " shakes daily for supplementation.  Can try Premier protein or Nestlé fair life protein shakes.  2.           Follow up: 2-3 months.      Oncologic History:    2/2021 PRESENTATION: Progressive aphasia.     2/19/2021 MR brain imaging with 3.3 x 2.8 x 2.8 cm enhancing mass in left frontal-parietal region. A second contrast enhancing lesion (0.5 x 1.0 x 1.0 cm) in right occipital lobe which is dural based and largely stable in size since 9/2011; likely representing a meningioma.    2/23/2021 SURGERY: Gross total resection by Dr. Cummings    PATHOLOGY: Glioblastoma; IDH1-R132H wild-type/ IDH 1 and 2 wildtype, MGMT promoter methylated. Not BRAF mutated.     3/23/2021 NEURO-ONC: Recommending chemoradiotherapy.     3/2021 ADMISSION: SOB and chest pain; CT PA negative, but bilateral DVT noted on U/S. Started on Lovenox. Acute hypoxic respiratory failure in the setting of bilateral pneumonia; presumed PJP, concern for transfusion-related acute lung injury and right hemidiaphragm paralysis. Seizure. Right retroperitoneal hematoma. Ileus. Non-severe malnutrition on TPN with concern for refeeding syndrome. 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 Shock Liver.    5/4 - 5/27/2021 RADS: 15 fractions.     5/25/2021 NEURO-ONC/ DEVICE: Discussed the role of Optune; to be considered. Repeat MRI in 4 weeks with plans to start adjuvant temozolomide.     6/22/2021 NEURO-ONC/ MRB/ CHEMO: Clinically improving. Imaging largely stable. Starting adjuvant temozolomide 150mg/m2 (250mg), cycle 1 (start date 6/24).     7/20/2021 NEURO-ONC/ CHEMO: Clinically improving. Thrombocytopenia noted with adjuvant temozolomide dosing, platelets of 26K; will transition to metronomic dosing 50mg/m2 (100mg) daily to start once platelets are > 80K.      8/17/2021 NEURO-ONC/ MRB/ CHEMO: Clinically improving. Saw Dr. Gamboa, who recommended starting anticoagulation; on Eliquis. Imaging with positive treatment response. Continue metronomic/ daily dosing at 50mg/m2  (100mg) through 12/2021.      9/14/2021 NEURO-ONC/ CHEMO: Clinically improving. Continue metronomic/ daily dosing at 50mg/m2 (100mg) through 12/2021.      10/12/2021 NEURO-ONC/ CHEMO: Clinically improving. Holding temozolomide and Zofran in the setting of severe constipation. Abdominal x-ray with high stool burden; consulted Dr. Hodge for management of constipation given history of ileus.     10/28/2021 CHEMO: Temozolomide restarted.     11/16/2021 NEURO-ONC/ MRB/ CHEMO: Clinically improving Less constipation, continued low appetite; referral to palliative care for mental health management. Referral to Dr. Agudelo to optimize rehab. Imaging with continued positive treatment response. Continue daily temozolomide.     3/1/2022 follow-up with Dr. Gay-patient doing overall okay.  Sleep and energy are better.  Following with palliative care and has Prozac and BuSpar, and mood is better.  Home therapies had stopped about 4 weeks ago, and interested in restarting.  She has had recent fall due to instability in her gait when she presented to the ER and imaging did not reveal any head injury or other injuries that were sustained.  Also presented to the ER as was leaning to the right, and there was concern for stroke however stroke work-up was negative.    Follow-up visit with Dr. Baker on 10/4/22.  Clinically improved cognition.  Recommended stopping Ritalin in light of no known benefit and worsening tremor.  Reestablish care with Dr. Agudelo.  Imaging with no overt evidence of cancer recurrence, the punctate area of contrast-enhancement nearly resolved.  Continue imaging surveillance.    1/17/23- Visit with Dr. Baker. Clinically with stable neurological deficits. Imaging with no overt evidence of cancer recurrence.      Symptoms,  Patient presents for a return visit.  Her  is present for the visit.  Overall, her imaging has demonstrated no overt evidence of cancer recurrence at today's visit.  Neurologically she is stable  when compared to her previous visit.  She even notes some improvement in her overall strength since her last visit.  She has continued at her current facility, however her  indicates that there are many concerns with that facility, and she will be transitioning next week most likely to a new facility that they have already visited and has a room.  She has been participating with physical therapy at the facility, however they feel that she has plateaued and are discharging her soon.  There have been no falls since her last visit or no near falls as well.  Olga has been able to ambulate with her walker at the facility.  In terms of her nutrition, it has been pretty good however her  states that she is not getting much for protein content in her meals.  In terms of her cognition, she continues to complain of short-term memory deficits that are intermittent.  Her  indicates that she seems very sharp at times when he has interactions with her and they are doing activities together.  She has not had neurocognitive testing for a while, and this may be of benefit as discussed at today's visit.      Therapies/HEP,  Currently participating in physical therapy through her facility, however will be discharged soon.      Functionally,   Stable since last visit.      Social history is unchanged.      Medications:  Current Outpatient Medications   Medication Sig Dispense Refill     acetaminophen (TYLENOL) 500 MG tablet Take 500 mg by mouth every 4 hours as needed for mild pain        albuterol (PROAIR HFA/PROVENTIL HFA/VENTOLIN HFA) 108 (90 Base) MCG/ACT inhaler Inhale 2 puffs into the lungs every 4 hours as needed for wheezing 18 g 3     albuterol (PROVENTIL) (2.5 MG/3ML) 0.083% neb solution Take 1 vial (2.5 mg) by nebulization every 4 hours as needed for wheezing or shortness of breath / dyspnea       amLODIPine (NORVASC) 5 MG tablet Take 1 tablet (5 mg) by mouth daily (Patient taking differently: Take 5  mg by mouth daily Hold if SBP >160 or DBP <90)       busPIRone HCl (BUSPAR) 30 MG tablet Take 30 mg by mouth 2 times daily       docosanol (ABREVA) 10 % CREA cream Apply topically 5 times daily Apply to cold sore on lip       FLUoxetine (PROZAC) 20 MG capsule Take 2 capsules (40 mg) by mouth daily 30 capsule 0     ketoconazole (NIZORAL) 2 % external shampoo Apply topically twice a week On Wed & Sun       levETIRAcetam (KEPPRA) 1000 MG tablet Take 1,000 mg by mouth 2 times daily Give with 250mg tab = 1250mg total dose twice daily       levETIRAcetam (KEPPRA) 250 MG tablet Take 250 mg by mouth 2 times daily Give with 100mg tab = 1250mg       loperamide (IMODIUM) 2 MG capsule TAKE 2 CAPSULES (4MG) BY MOUTH AFTER FIRST LOOSE STOOL, THEN;TAKE 1 CAPSULE AFTER CONSECUTIVE STOOLS, MAX 4 CAPS/24H       melatonin 1 MG TABS tablet Take 1 tablet (1 mg) by mouth nightly as needed for sleep       Methylphenidate HCl (RITALIN PO) Take 5 mg by mouth every morning       multivitamin, therapeutic (THERA-VIT) TABS tablet Take 1 tablet by mouth daily       ondansetron (ZOFRAN) 4 MG tablet Take 1 tablet (4 mg) by mouth every 8 hours as needed for nausea 30 tablet 3     pantoprazole (PROTONIX) 40 MG EC tablet Take 1 tablet (40 mg) by mouth 2 times daily (before meals) 30 tablet 0     rivaroxaban ANTICOAGULANT (XARELTO ANTICOAGULANT) 20 MG TABS tablet Take 1 tablet (20 mg) by mouth daily (with dinner) 30 tablet 3              Physical Exam:   /79   Pulse 73   Temp 98.6  F (37  C) (Oral)   Resp 16   Wt 64.6 kg (142 lb 6.7 oz)   SpO2 94%   BMI 21.03 kg/m    Gen: NAD, pleasant and cooperative   HEENT: Normocephalic  Cardio: Normal sinus rhythm, S1 and S2, No murmurs or gallops  Pulm: non-labored breathing in room air, clear to auscultation anteriorly.  No cough.  Abd: benign  Ext: WWP, no edema in BLE, no tenderness in calves.  Neuro/MSK:   Appearance: Left shoulder is elevated higher than right shoulder  ROM: AROM right shoulder  abduction 110 degrees, PROM 170 degrees  Strength: Right shoulder abduction 3/5, Right hip flexion 4/5.  All other manual muscle testing 5/5.  Sensation: Symmetric to light touch bilaterally.  Reflexes:  No clonus.    Labs/Imaging:  Lab Results   Component Value Date    WBC 7.4 01/13/2023    HGB 10.7 (L) 01/13/2023    HCT 31.9 (L) 01/13/2023    MCV 93 01/13/2023     01/13/2023     Lab Results   Component Value Date     01/13/2023    POTASSIUM 3.9 01/13/2023    CHLORIDE 106 01/13/2023    CO2 28 01/13/2023    GLC 93 01/13/2023     Lab Results   Component Value Date    GFRESTIMATED 74 01/13/2023    GFRESTBLACK >90 07/09/2021     Lab Results   Component Value Date    AST 19 01/13/2023    ALT 20 01/13/2023    ALKPHOS 82 01/13/2023    BILITOTAL 0.3 01/13/2023     Lab Results   Component Value Date    INR 1.44 (H) 02/14/2022     Lab Results   Component Value Date    BUN 16 01/13/2023    CR 0.81 01/13/2023              Assessment/Plan   Olga Bailey presents to clinic today for follow up reg her rehab needs. She has h/o left frontoparietal glioblastoma (IDH wild-type, MGMT promoter methylated). Was last seen in clinic on 10/18/22.  As discussed with her client and her , with her ongoing short-term memory deficits and cognitive difficulties, recommendation is to repeat neuropsych testing with Dr. Lantigua.  They are in agreement with this, so this referral order was placed today.  This will also help determination of whether the patient may benefit from cognitive rehabilitation again.  Her  did have quite a few concerns with her current living facility including decreased activity level, poor quality food and some concerns with the staff.  So he has plans to move her to a different facility in a week per his history. We discussed a follow up visit in 3 months to help with ongoing rehabilitation needs. Patient and her  are in agreement with this plan.      1.            Therapy/equipment/braces:  1. Continue physical therapy at current facility to initiate more aggressive physical therapy regimen in an view of her clinical improvement and functional potential.  2. Continue protein supplementation daily as tolerated.  3.    Neuropsychology referral was placed with Dr. Lantigua for repeat Neuropsych testing.   4.   Family will reach out once she is moved to a new facility so we can communicate with physical therapy team there/place the appropriate recommendations as needed.  2.           Follow up: 3 months.      Yesenia Agudelo MD  Physical Medicine & Rehabilitation      50 minutes spent on the date of the encounter doing chart review, history and exam, documentation and further activities as noted above.              Again, thank you for allowing me to participate in the care of your patient.        Sincerely,        Yesenia Agudelo MD

## 2023-01-23 ENCOUNTER — TELEPHONE (OUTPATIENT)
Dept: NEUROPSYCHOLOGY | Facility: CLINIC | Age: 78
End: 2023-01-23

## 2023-01-23 NOTE — TELEPHONE ENCOUNTER
[facility] Call Center    Phone Message    May a detailed message be left on voicemail: yes     Reason for Call: Other: Patient was returning a call regarding scheduling an appointment. Please call back when available.      Action Taken: Other: Neuropsychology    Travel Screening: Not Applicable

## 2023-02-16 NOTE — PLAN OF CARE
Pt is alert and oriented. Uses call light appropriately and able to make needs know. Denies pain, SOB, CP or headache. Had a fair appetite. Continues to be on DD2 diet with nectar thick liquids. Takes meds whole. Purewick on at night. Transfers with 1-2 assist using gait belt and walker. Call light is in reach. Continue with plan of care.      Patient's most recent vital signs are:     Vital signs:  BP: 121/81  Temp: 97.7  HR: 96  RR: 16  SpO2: 96 %     Patient does not have new respiratory symptoms.  Patient does not have new sore throat.  Patient does not have a fever greater than 99.5.            Detail Level: Detailed Skin Checks Recommendations: No evidence of recurrence.

## 2023-03-07 ENCOUNTER — MEDICAL CORRESPONDENCE (OUTPATIENT)
Dept: HEALTH INFORMATION MANAGEMENT | Facility: CLINIC | Age: 78
End: 2023-03-07

## 2023-03-08 ENCOUNTER — PATIENT OUTREACH (OUTPATIENT)
Dept: ONCOLOGY | Facility: CLINIC | Age: 78
End: 2023-03-08
Payer: MEDICARE

## 2023-03-08 NOTE — PROGRESS NOTES
[facility]: Cancer Care                                                                                          POLST form and Physician orders with office notes sent to [facility] per Fahad Bailey's request     Signature:  Nafisa Vee RN

## 2023-03-09 ENCOUNTER — OFFICE VISIT (OUTPATIENT)
Dept: NEUROPSYCHOLOGY | Facility: CLINIC | Age: 78
End: 2023-03-09
Attending: STUDENT IN AN ORGANIZED HEALTH CARE EDUCATION/TRAINING PROGRAM
Payer: MEDICARE

## 2023-03-09 DIAGNOSIS — R41.3 SHORT-TERM MEMORY LOSS: ICD-10-CM

## 2023-03-09 DIAGNOSIS — F03.90 SENILE DEMENTIA, UNCOMPLICATED (H): Primary | ICD-10-CM

## 2023-03-09 DIAGNOSIS — R26.81 GAIT INSTABILITY: ICD-10-CM

## 2023-03-09 DIAGNOSIS — R53.0 NEOPLASTIC MALIGNANT RELATED FATIGUE: ICD-10-CM

## 2023-03-09 DIAGNOSIS — R53.81 PHYSICAL DECONDITIONING: ICD-10-CM

## 2023-03-09 DIAGNOSIS — R41.89 COGNITIVE CHANGE: ICD-10-CM

## 2023-03-09 DIAGNOSIS — C71.9 GLIOBLASTOMA (H): ICD-10-CM

## 2023-03-09 PROCEDURE — 96139 PSYCL/NRPSYC TST TECH EA: CPT | Performed by: CLINICAL NEUROPSYCHOLOGIST

## 2023-03-09 PROCEDURE — 90791 PSYCH DIAGNOSTIC EVALUATION: CPT | Performed by: CLINICAL NEUROPSYCHOLOGIST

## 2023-03-09 PROCEDURE — 96133 NRPSYC TST EVAL PHYS/QHP EA: CPT | Performed by: CLINICAL NEUROPSYCHOLOGIST

## 2023-03-09 PROCEDURE — 96138 PSYCL/NRPSYC TECH 1ST: CPT | Performed by: CLINICAL NEUROPSYCHOLOGIST

## 2023-03-09 PROCEDURE — 96132 NRPSYC TST EVAL PHYS/QHP 1ST: CPT | Performed by: CLINICAL NEUROPSYCHOLOGIST

## 2023-03-10 ENCOUNTER — TELEPHONE (OUTPATIENT)
Dept: NEUROPSYCHOLOGY | Facility: CLINIC | Age: 78
End: 2023-03-10

## 2023-03-10 NOTE — NURSING NOTE
Pt was seen for neuropsychological evaluation at the request of Yesenia Agudelo MD for the purposes of diagnostic clarification and treatment planning. 257 minutes of test administration and scoring were provided by this writer. Please see Dr. Héctor Lantigua's report for a full interpretation of the findings.    Lenore Ortiz  Psychometrist

## 2023-03-10 NOTE — TELEPHONE ENCOUNTER
[facility] Call Center    Phone Message    May a detailed message be left on voicemail: yes     Reason for Call: Other: Patient's  Spouse is returning a call regarding the visit yesterday. Please call back when available.     Action Taken: Other: Neuropsychology    Travel Screening: Not Applicable

## 2023-03-11 NOTE — PROGRESS NOTES
NAME  Olga Bailey    MRN  [id number]      45     AGE  77     SEX  Female     HANDEDNESS Ambidextrous    EDUCATION 16     JACOBSEN  3/9/23     PROVIDER  [facility]  SW     STATION  OP            ORIENTATION      Time  -64     Personal Info.     Place  2 /2    Presidents             WAIS-IV         WMI: 63      RDS: 4             Raw SS    Similarities  27 12    Matrix Reasoning 2 3    Digit Span  8 1    Arithmetic  8 6    Symbol Search 3 2           REUBEN-O COMPLEX FIGURE       Raw T %ile   Copy  12  <1   Time to Copy 600  <1                       WRAT    5     SS %ile GE   Reading  92 30 10.2          COWAT FAS       Raw 8      SS 1      T 15             ANIMAL FLUENCY      Raw 6      SS 2      T 17              BOSTON NAMING TEST     Raw 56 /60     SS 12      %ile 93             COMPLEX IDEATIONAL MATERIAL     Raw 10      SS 7      T 30             TRAIL MAKING TEST       Time Errors SSa %ile   A 125 2 2 <10   B DC couldn't do sample           STROOP        Raw %ile     Word 24 <10     Color 12 <10     C/W 2 <10             YKAW   H   Raw DC couldn't do samples    %ile 0             FINGER TAPPING       Raw Drops SS T   RH DC could not complete    LH              PHQ-9       Raw 3      Interp. Minimal             PARIS-7       Raw 1      Interp. Minimal             SENTENCE REPETITION      Raw 7 /14     %ile 1              HVLT   1     Raw T    Trial 1  3     Trial 2  4     Trial 3  7     Learning  4     Total Recall 14 33    Delayed Recall 3 31    Percent Retention 43% 29    True Positives 9     False Positives 3     Disc. Index  6 26            BVMT   1     Raw T/%ile    Trial 1  1     Trial 2  1     Trial 3  2     Learning  1     Total Recall 4 22    Delayed Recall 2 27    Percent Retention 100% >16    Recognition Hits 6     Recognition F.P 0     Disc. Index  6 >16           DCT       E-Score 41

## 2023-03-13 NOTE — PHARMACY-MEDICATION REGIMEN REVIEW
"Pharmacy Medication Regimen Review  Olga Bailey is a 75 year old female who is currently in the Transitional Care Unit.    Assessment: All medications have an appropriate indications, durations and no unnecessary use was found.  Bactrim (single strength): \"prophylaxis despite being off steroids. Per oncology telephone encounter in chart on 6/29 can stop Bactrim once ALC consistently > 0.5\"- per provider's note.      Plan:   Continue current treatment.  Please wean off quetiapine if possible.  Attending provider will be sent this note for review.  If there are any emergent issues noted above, pharmacist will contact provider directly by phone.      Pharmacy will periodically review the resident's medication regimen for any PRN medications not administered in > 72 hours and discontinue them. The pharmacist will discuss gradual dose reductions of psychopharmacologic medications with interdisciplinary team on a regular basis.    Please contact pharmacy if the above does not answer specific medication questions/concerns.    Background:  A pharmacist has reviewed all medications and pertinent medical history today.  Medications were reviewed for appropriate use and any irregularities found are listed with recommendations.      Current Facility-Administered Medications:      acetaminophen (TYLENOL) tablet 650 mg, 650 mg, Oral, Q4H PRN, Hiniker, Mariana A, PA-C, 650 mg at 07/05/21 2107     albuterol (PROAIR HFA/PROVENTIL HFA/VENTOLIN HFA) 108 (90 Base) MCG/ACT inhaler 2 puff, 2 puff, Inhalation, Q6H PRN, Hiniker, Mariana A, PA-C     amLODIPine (NORVASC) tablet 5 mg, 5 mg, Oral, Daily, Hiniker Mariana A, PA-C, 5 mg at 07/06/21 0816     busPIRone (BUSPAR) tablet 30 mg, 30 mg, Oral, BID, Hiniker, Mariana A, PA-C, 30 mg at 07/06/21 0816     calcium carbonate (TUMS) chewable tablet 1,000 mg, 1,000 mg, Oral, 4x Daily PRN, Hiniker Mariana A, PA-C     calcium polycarbophil (FIBERCON) tablet 625 mg, 625 mg, Oral, Daily, " Sabine Henriquezin ANA MARÍA, PA-C, 625 mg at 07/02/21 0956     carboxymethylcellulose PF (REFRESH PLUS) 0.5 % ophthalmic solution 1 drop, 1 drop, Both Eyes, Q1H PRN, Mariana Henriquez PA-C     docusate sodium (COLACE) capsule 100 mg, 100 mg, Oral, BID PRN, Nia Heredia PA     FLUoxetine (PROzac) capsule 60 mg, 60 mg, Oral, Daily, Sabine Henriquezin ANA MARÍA, PA-C, 60 mg at 07/06/21 0815     furosemide (LASIX) tablet 40 mg, 40 mg, Oral, Daily, Sabine Henriquezin ANA MARÍA, PA-C, 40 mg at 07/06/21 0816     levETIRAcetam (KEPPRA) tablet 1,250 mg, 1,250 mg, Oral, BID, Sabine Henriquezin ANA MARÍA, PA-C, 1,250 mg at 07/06/21 0816     linaclotide (LINZESS) capsule 290 mcg, 290 mcg, Oral, QAM AC, Sabine Henriquezin ANA MARÍA, PA-C, 290 mcg at 07/06/21 0619     melatonin tablet 3 mg, 3 mg, Oral, At Bedtime PRN, Mayra Leija PA-C, 3 mg at 06/30/21 2249     menthol (ICY HOT) 5 % patch 1 patch, 1 patch, Topical, Q8H PRN, 1 patch at 06/27/21 2123 **AND** menthol (ICY HOT) Patch in Place, , Transdermal, Q8H, Rabia López PA-C     Nurse may request from Pharmacy a change of form of medication (e.g. Liquid to tablet)., , Does not apply, Continuous PRN, Mariana Henriquez PA-C     ondansetron (ZOFRAN-ODT) ODT tab 4 mg, 4 mg, Oral, Q6H PRN, Sabine Henriquezin ANA MARÍA, PA-C, 4 mg at 07/02/21 0851     pantoprazole (PROTONIX) EC tablet 40 mg, 40 mg, Oral, BID AC, Sabine Henriquezin A, PA-C, 40 mg at 07/06/21 0619     polyethylene glycol (MIRALAX) Packet 17 g, 17 g, Oral, BID PRN, Mariana Henriquez PA-C     potassium chloride (KLOR-CON) Packet 20 mEq, 20 mEq, Oral, Daily, Nia Heredia PA 20 mEq at 07/06/21 0816     QUEtiapine (SEROquel) tablet 25 mg, 25 mg, Oral, At Bedtime, Rabia López PA-C, 25 mg at 07/05/21 2108     rivaroxaban ANTICOAGULANT (XARELTO) tablet 15 mg, 15 mg, Oral, BID w/meals, Mayra Leija PA-C, 15 mg at 07/06/21 0816     [START ON 7/15/2021] rivaroxaban ANTICOAGULANT (XARELTO) tablet 20 mg, 20 mg, Oral, Daily with Giovanna hendrix,  TERA Cox     sennosides (SENOKOT) tablet 2 tablet, 2 tablet, Oral, At Bedtime PRN, Nia Heredia PA     simethicone (MYLICON) chewable tablet 80 mg, 80 mg, Oral, Q6H PRN, Mariana Henriquez PA-C     sulfamethoxazole-trimethoprim (BACTRIM) 400-80 MG per tablet 1 tablet, 1 tablet, Oral, Daily, Mariana Henriquez PA-C, 1 tablet at 07/06/21 0816     tuberculin injection 5 Units, 5 Units, Intradermal, Q21 Days, Mariana Henriquez PA-C  No current outpatient prescriptions on file.   PMH: glioblastoma multiforme s/p resection (2/23/21), Seizures, BLE DVT s/p IVC filters, h/o Right Retroperitoneal Hematoma, Recent Mechanical Fall, Posterior Scalp Hematoma, Possible Concussion, MDD, PARIS, HTN, GERD, and hypokalemia.   Name band;

## 2023-04-07 NOTE — LETTER
"    7/19/2022         RE: Olga Bailey  [id number] [facility] [id number]        Dear Colleague,    Thank you for referring your patient, Olga Bailey, to the [facility]. Please see a copy of my visit note below.    NEURO-ONCOLOGY VISIT  Jul 19, 2022    CHIEF COMPLAINT: Ms. Olga Bailey is a 76 year old right-handed woman with a left frontoparietal glioblastoma (IDH wild-type, MGMT promoter methylated), diagnosed following gross total resection on 2/23/2021. Initiation of upfront cancer-directed treatment was delayed due to a complicated hospitalization. Given a resolving infection and lowered functional status, radiation alone was initiated on 5/4/2021 and completed on 5/27/2021.     Olga started adjuvant therapy with 150mg/m2 dosing of temozolomide, however, cycle 1 was complicated by significant hematologic toxicity. Dosing was changed to metronomic/ daily dosing in 7/2021 and this was well tolerated. Imaging from 8/2021 and 11/2021 demonstrates positive treatment effect.    Chemotherapy was placed on a hold in 10/2021 in the setting of severe constipation, but then, resumed. She has since completed the planned 6 months of adjuvant temozolomide as of 12/2021.    Imaging since 2/2022 has been without concern for cancer recurrence, however, imaging in 7/2022 showed a punctate area of contrast enhancement of indeterminate significance. The plan remains to continue on imaging surveillance.     Olga is presenting in follow-up accompanied by La Nena (daughter) and Fahad (father).    HISTORY OF PRESENT ILLNESS  -Olga is fairly well today. She was admitted in April. Both Olga and La Nena have noted much improvement since hospital discharge;    Using right arm better.   On a regular diet.    Walking with some assist with PT.    Cognition is nearing baseline. Ongoing issues with word finding difficulty and concentration. Olga feels that she \"fades in and " DISPLAY PLAN FREE TEXT "out\" of her thinking/ train of thought.   -Appetite is low.   -Sleep is good. Energy is fairly good during the day; less naps in the afternoon.    -No issues with constipation.  -Mood is \"better\". More of a sense of humor now per Fahad. Off Remeron and Zyprexa.   -No headaches recently.  -Off steroids.   -No recurrent seizures since her last visit, tolerating Keppra.  -On anticoagulation with no significant signs/ symptoms of bleeding. Had a bloody nose awhile ago. Some bruising.        MEDICATIONS   Current Outpatient Medications   Medication     acetaminophen (TYLENOL) 500 MG tablet     albuterol (PROAIR HFA/PROVENTIL HFA/VENTOLIN HFA) 108 (90 Base) MCG/ACT inhaler     albuterol (PROVENTIL) (2.5 MG/3ML) 0.083% neb solution     amLODIPine (NORVASC) 5 MG tablet     busPIRone HCl (BUSPAR) 30 MG tablet     docosanol (ABREVA) 10 % CREA cream     FLUoxetine (PROZAC) 20 MG capsule     ketoconazole (NIZORAL) 2 % external shampoo     levETIRAcetam (KEPPRA) 1000 MG tablet     levETIRAcetam (KEPPRA) 250 MG tablet     loperamide (IMODIUM) 2 MG capsule     melatonin 1 MG TABS tablet     methylphenidate (RITALIN) 5 MG tablet     mirabegron (MYRBETRIQ) 25 MG 24 hr tablet     multivitamin, therapeutic (THERA-VIT) TABS tablet     Nutritional Supplements (ENSURE CLEAR) LIQD     ondansetron (ZOFRAN) 4 MG tablet     pantoprazole (PROTONIX) 40 MG EC tablet     rivaroxaban ANTICOAGULANT (XARELTO ANTICOAGULANT) 20 MG TABS tablet     No current facility-administered medications for this visit.     Current Outpatient Medications   Medication Sig Dispense Refill     acetaminophen (TYLENOL) 500 MG tablet Take 500 mg by mouth every 4 hours as needed for mild pain        albuterol (PROAIR HFA/PROVENTIL HFA/VENTOLIN HFA) 108 (90 Base) MCG/ACT inhaler Inhale 2 puffs into the lungs every 4 hours as needed for wheezing 18 g 3     albuterol (PROVENTIL) (2.5 MG/3ML) 0.083% neb solution Take 1 vial (2.5 mg) by nebulization every 4 hours as needed " for wheezing or shortness of breath / dyspnea       amLODIPine (NORVASC) 5 MG tablet Take 1 tablet (5 mg) by mouth daily (Patient taking differently: Take 5 mg by mouth daily Hold if SBP >160 or DBP <90)       busPIRone HCl (BUSPAR) 30 MG tablet Take 30 mg by mouth 2 times daily       docosanol (ABREVA) 10 % CREA cream Apply topically 5 times daily Apply to cold sore on lip       FLUoxetine (PROZAC) 20 MG capsule Take 2 capsules (40 mg) by mouth daily 30 capsule 0     ketoconazole (NIZORAL) 2 % external shampoo Apply topically twice a week On Wed & Sun       levETIRAcetam (KEPPRA) 1000 MG tablet Take 1,000 mg by mouth 2 times daily Give with 250mg tab = 1250mg total dose twice daily       levETIRAcetam (KEPPRA) 250 MG tablet Take 250 mg by mouth 2 times daily Give with 100mg tab = 1250mg       loperamide (IMODIUM) 2 MG capsule TAKE 2 CAPSULES (4MG) BY MOUTH AFTER FIRST LOOSE STOOL, THEN;TAKE 1 CAPSULE AFTER CONSECUTIVE STOOLS, MAX 4 CAPS/24H       melatonin 1 MG TABS tablet Take 1 tablet (1 mg) by mouth nightly as needed for sleep       methylphenidate (RITALIN) 5 MG tablet Take 1 tablet (5 mg) by mouth daily Start taking 1/2 tablet daily in the morning for 7 days, then increase to 1 full tablet daily. 30 tablet 0     mirabegron (MYRBETRIQ) 25 MG 24 hr tablet Take 25 mg by mouth daily       multivitamin, therapeutic (THERA-VIT) TABS tablet Take 1 tablet by mouth daily       Nutritional Supplements (ENSURE CLEAR) LIQD TAKE ONE CAN BY MOUTH TWICE DAILY IN BETWEEN MEALS FOR WEIGHT LOSS       ondansetron (ZOFRAN) 4 MG tablet Take 1 tablet (4 mg) by mouth every 8 hours as needed for nausea 30 tablet 3     pantoprazole (PROTONIX) 40 MG EC tablet Take 1 tablet (40 mg) by mouth 2 times daily (before meals) 30 tablet 0     rivaroxaban ANTICOAGULANT (XARELTO ANTICOAGULANT) 20 MG TABS tablet Take 1 tablet (20 mg) by mouth daily (with dinner) 30 tablet 3     DRUG ALLERGIES   Allergies   Allergen Reactions     Pilocarpine  Headache and Nausea and Vomiting     Chlorhexidine Itching and Rash     Aripiprazole Headache and Other (See Comments)     Insomnia, nightmares     Bacitracin Rash     Bactroban is effective; No difficulties     Erythromycin      intolerant.     Meperidine Hcl      intolerant only   Demerol     Chloraprep One Step Rash       IMMUNIZATIONS   Immunization History   Administered Date(s) Administered     COVID-19,PF,Moderna 03/18/2021, 07/22/2021, 01/18/2022     Flu 65+ Years 09/28/2020     HepB 01/01/1990     Influenza (IIV3) PF 01/01/2001     Influenza Vaccine IM > 6 months Valent IIV4 (Alfuria,Fluzone) 09/28/2020     Influenza, Quad, High Dose, Pf, 65yr+ (Fluzone HD) 09/28/2020     Pneumo Conj 13-V (2010&after) 10/29/2014     Pneumococcal 23 valent 07/22/2020     TDAP Vaccine (Boostrix) 08/22/2016     Tetanus 06/13/2004     Zoster vaccine recombinant adjuvanted (SHINGRIX) 12/24/2019, 10/12/2020     Zoster vaccine, live 12/18/2008       ONCOLOGIC HISTORY  -2/2021 PRESENTATION: Progressive aphasia.   -2/19/2021 MR brain imaging with 3.3 x 2.8 x 2.8 cm enhancing mass in left frontal-parietal region. A second contrast enhancing lesion (0.5 x 1.0 x 1.0 cm) in right occipital lobe which is dural based and largely stable in size since 9/2011; likely representing a meningioma.  -2/23/2021 SURGERY: Gross total resection by Dr. Cummings  PATHOLOGY: Glioblastoma; IDH1-R132H wild-type/ IDH 1 and 2 wildtype, MGMT promoter methylated. Not BRAF mutated.   -3/23/2021 NEURO-ONC: Recommending chemoradiotherapy.   -3/2021 ADMISSION: SOB and chest pain; CT PA negative, but bilateral DVT noted on U/S. Started on Lovenox. Acute hypoxic respiratory failure in the setting of bilateral pneumonia; presumed PJP, concern for transfusion-related acute lung injury and right hemidiaphragm paralysis. Seizure. Right retroperitoneal hematoma. Ileus. Non-severe malnutrition on TPN with concern for refeeding syndrome. 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  DISPLAY PLAN FREE TEXT DISPLAY PLAN FREE TEXT Shock Liver.  -5/4 - 5/27/2021 RADS: 15 fractions.   -5/25/2021 NEURO-ONC/ DEVICE: Discussed the role of Optune; to be considered. Repeat MRI in 4 weeks with plans to start adjuvant temozolomide.   -6/22/2021 NEURO-ONC/ MRB/ CHEMO: Clinically improving. Imaging largely stable. Starting adjuvant temozolomide 150mg/m2 (250mg), cycle 1 (start date 6/24).   -7/20/2021 NEURO-ONC/ CHEMO: Clinically improving. Thrombocytopenia noted with adjuvant temozolomide dosing, platelets of 26K; will transition to metronomic dosing 50mg/m2 (100mg) daily to start once platelets are > 80K.    -8/17/2021 NEURO-ONC/ MRB/ CHEMO: Clinically improving. Saw Dr. Gamboa, who recommended starting anticoagulation; on Eliquis. Imaging with positive treatment response. Continue metronomic/ daily dosing at 50mg/m2 (100mg) through 12/2021.    -9/14/2021 NEURO-ONC/ CHEMO: Clinically improving. Continue metronomic/ daily dosing at 50mg/m2 (100mg) through 12/2021.    -10/12/2021 NEURO-ONC/ CHEMO: Clinically improving. Holding temozolomide and Zofran in the setting of severe constipation. Abdominal x-ray with high stool burden; consulted Dr. Hodge for management of constipation given history of ileus.   -10/28/2021 CHEMO: Temozolomide restarted.   -11/16/2021 NEURO-ONC/ MRB/ CHEMO: Clinically improving Less constipation, continued low appetite; referral to palliative care for mental health management. Referral to Dr. Agudelo to optimize rehab. Imaging with continued positive treatment response. Continue daily temozolomide.   -12/21/2021 NEURO-ONC/ CHEMO: Clinically improving in terms of appetite, energy. Stopping daily temozolomide at the end of the month. Discussion with Dr. Gamboa regarding removal of IVC filter.   -2/14/2022 MRB with a stable postoperative changes of left parietal craniotomy and tumor resection.   -3/1/2022 NEURO-ONC: Clinically deconditioned, mood is depressed; following with Cancer PM&R and Palliative Care. With imaging stable,  "continue imaging surveillance.   -4/4/2022 MRB with stable postoperative change of left-sided craniotomy and tumor resection.  -7/19/2022 NEURO-ONC/ MRB: Clinically improving. Imaging with no overt evidence of cancer recurrence, new punctate area of contrast enhancement of indeterminate significance; continue imaging surveillance.     SOCIAL HISTORY   History   Smoking Status     Never Smoker   Smokeless Tobacco     Never Used    Social History    Substance and Sexual Activity      Alcohol use: Yes        Comment: very rare     History   Drug Use No       PHYSICAL EXAMINATION  /86 (BP Location: Left arm, Patient Position: Sitting, Cuff Size: Adult Regular)   Pulse 77   Temp 98.1  F (36.7  C) (Oral)   SpO2 92%   Wt Readings from Last 2 Encounters:   06/03/22 63 kg (139 lb)   04/18/22 65.6 kg (144 lb 11.2 oz)      Ht Readings from Last 2 Encounters:   06/03/22 1.753 m (5' 9\")   04/05/22 1.6 m (5' 3\")     KPS: 60    -Good energy today. Engaged in the conversation.   -Legs: Mild swelling.   -Psychiatric: Good mood, reactive affect. Pleasant, talkative.  -Neurologic:   MENTAL STATUS:     Alert, oriented.     Speech fluent. Did loose train of thought at times.   Comprehension with some confusion.      CRANIAL NERVES:     Pupils are equal, round.     Extraocular movements full, denies diplopia.     Facial sensation intact to light touch.   No facial droop.   Hearing slightly decreased bilaterally.   Normal phonation.   MOTOR: No pronation or drift.    SENSATION: Intact to light touch throughout.  GAIT: Sitting in a wheelchair, did not assess today.        MEDICAL RECORDS  Obtained and personally reviewed all available outside medical records in addition to reviewing any records available in our electronic system.   Hospitalization records.     LABS  Personally reviewed all available lab results; CBC and CMP.     IMAGING  Personally reviewed MR brain imaging from today and compared to prior imaging. To my eye, " there is no overt evidence of cancer recurrence about the left parietal resection cavity.    There is a new punctate focus of enhancement within the brain parenchyma deep and posterior to the resection cavity may reflect treatment-related effect, but is too small to further characterize (below).        Unchanged dural-based mass along the right occipital lobe, presumably meningioma.     Imaging was shown to and results were reviewed with Olga and her family.       4/16/2022 MR cervical spine;  1.  Severe spinal stenosis at C6-C7 with severe left foraminal stenosis at this level.  2.  Moderate to severe bilateral foraminal stenosis at C5-C6 and C4-C5.  3.  Severe left foraminal stenosis at C3-C4.    4/4/2022 CT c/a/p;  1.  Mild nonspecific stranding around the lower poles of the kidneys. Correlate clinically for pyelonephritis.  2.  Resolved large right retroperitoneal hematoma.  3.  Stable mild central intrahepatic and extrahepatic biliary ductal dilatation. No calcified gallstones or obstructing masses.    4/2022 Swallow Evaluation;  1. Deep penetration without aspiration is noted with thin barium. No penetration or aspiration is noted with other consistencies of barium. 2. Please refer to the speech pathology report for further details.      IMPRESSION  Clinic time for this high complexity encounter was spent discussing in detail the nature of her cancer in light of her recent imaging. This was in addition to answering questions pertaining to my recommendations and devising the plan as outlined below.     Olga was admitted to [facility] on 4/4/22 for evaluation for generalized weakness. On presentation, she was hypoxic with oxygen saturation at 88%. Chest x-ray showed mild alveolar infiltrate in the left midlung. She was treated with Zosyn to cover aspiration pneumonia. A CT of chest was done on 4/8/22 and showed increased extensive linear, groundglass and patchy opacities in both  DISPLAY PLAN FREE TEXT lungs- likely due to atypical pneumonia. Pulmonary medicine was consulted. She continued to have intermittent fevers and weakness, thus IV Vancomycin was added and empiric prednisone was started plus Bactrim. With treatment, the fevers resolved, strength and mental status improved. Olga was weaned off of supplemental oxygen and the steroids. I have reviewed her post-discharge primary care notes and also, the clinic note from pulmonary medicine that stated that her lung function was recovering following this bout of pneumonia.     Today, Olga's functional status continues to improve. She received much benefit for her stay at rehab and has an appointment with Dr. Magnus cain. With excellent ongoing management of medications, ongoing access to rehab therapies, and management of medical issues, I would anticipate continued improvement in her cognitive and physical status. Olga continues to follow with Dr. Plaza in palliative care to address mood/ fatigue.    This impression of her prognosis is also based on her recent brain imaging that is again without any overt evidence of cancer recurrence. However, there is a new punctate area of contrast enhancement of indeterminate significance. It may represent anticipated radiation-induced injury or a subacute punctate stroke. As a result, will continue to monitor cautiously with imaging surveillance. I called La Nena following this appointment today to provider her with the results of the MRI.     PROBLEM LIST  Glioblastoma  Aphasia  Apraxia    PLAN  -CANCER DIRECTED THERAPY-  -Repeat imaging in 2-3 months.  -Will repeat labs in 2-3 months.     -STEROIDS-  -Off dexamethasone.    -SEIZURE MANAGEMENT-  -Given history of seizures, antiepileptics are indicated.   -Continue Keppra.     -DVT-  -Following with Dr. Gamboa; currently on Eliquis.   -Requires lifelong anticoagulation given cancer diagnosis.   -Holding on removal of the IVC filter at this time.  "    -Quality of life/ MOOD/ FATIGUE-  -History of depressed mood, PTSD, poor motivation, and poor appetite.   -Following with palliative care to assess mental health and manage medications.  -Cannot tolerate CPAP for MARCELLE.    -REHAB NEEDS-  -PT/ OT.   -Following with Dr. Agudelo in cancer rehab.     Return to clinic in 2-3 months + labs + imaging.     In the meantime, Olga and La Nena know to call with questions or concerns or to report new complaints and can be seen sooner if needed.     Stephanie Baker MD  Neuro-oncology      Oncology Rooming Note    July 19, 2022 2:28 PM   Olga Bailey is a 76 year old female who presents for:    Chief Complaint   Patient presents with     Oncology Clinic Visit     GBM (glioblastoma multiforme) (H)         Initial Vitals: BP (!) 147/89 (BP Location: Right arm, Patient Position: Sitting, Cuff Size: Adult Regular)   Pulse 77   Temp 98.1  F (36.7  C) (Oral)   SpO2 92%  Estimated body mass index is 20.53 kg/m  as calculated from the following:    Height as of 6/3/22: 1.753 m (5' 9\").    Weight as of 6/3/22: 63 kg (139 lb). There is no height or weight on file to calculate BSA.  No Pain (0) Comment: Data Unavailable   No LMP recorded. Patient has had a hysterectomy.  Allergies reviewed: Yes  Medications reviewed: Yes    Medications: Medication refills not needed today.  Pharmacy name entered into EPIC: Data Unavailable    Clinical concerns: no      Paula Turner [facility]                  Again, thank you for allowing me to participate in the care of your patient.        Sincerely,        Stephanie Baker MD    " DISPLAY PLAN FREE TEXT DISPLAY PLAN FREE TEXT DISPLAY PLAN FREE TEXT DISPLAY PLAN FREE TEXT DISPLAY PLAN FREE TEXT

## 2023-04-16 NOTE — PROGRESS NOTES
NEURO-ONCOLOGY VISIT  Apr 18, 2023    CHIEF COMPLAINT: Ms. Olga Bailey is a 77 year old right-handed woman with a left frontoparietal glioblastoma (IDH wild-type, MGMT promoter methylated), diagnosed following gross total resection on 2/23/2021. Initiation of upfront cancer-directed treatment was delayed due to a complicated hospitalization. Given a resolving infection and lowered functional status, radiation alone was initiated on 5/4/2021 and completed on 5/27/2021.     Olga started adjuvant therapy with 150mg/m2 dosing of temozolomide, however, cycle 1 was complicated by significant hematologic toxicity. Dosing was changed to metronomic/ daily dosing in 7/2021 and this was well tolerated. Imaging from 8/2021 and 11/2021 demonstrates positive treatment effect.    Chemotherapy was placed on a hold in 10/2021 in the setting of severe constipation, but then, resumed. She has since completed the planned 6 months of adjuvant temozolomide as of 12/2021.    Imaging since 2/2022 has been without concern for cancer recurrence, however, imaging in 7/2022 showed a punctate area of contrast enhancement of indeterminate significance. Repeat imaging in 10/2022 showed near resolution of that punctate lesion and no evidence of cancer recurrence. Imaging in 1/2023 and in 4/2023 was again without concern. The plan remains to continue on imaging surveillance.     Olga is presenting in follow-up accompanied by Fahad ().    HISTORY OF PRESENT ILLNESS  -Olga is now living at [facility], which is a good fit for her. Per Fahad, there are excellent options for socializing and the food is much better.   -She is now using a wheelchair more. Fahad thinks that she is more strong and has been assisting him more with transfers.   -Mental processing has been more quick lately. Olga is reading more.   -Good energy.  -Mood is overall good.   -No headaches.  -No recurrent seizures since her last visit,  tolerating Keppra.  -On anticoagulation with no significant signs/ symptoms of bleeding.      MEDICATIONS   Current Outpatient Medications   Medication     acetaminophen (TYLENOL) 500 MG tablet     albuterol (PROAIR HFA/PROVENTIL HFA/VENTOLIN HFA) 108 (90 Base) MCG/ACT inhaler     amLODIPine (NORVASC) 5 MG tablet     busPIRone HCl (BUSPAR) 30 MG tablet     FLUoxetine (PROZAC) 20 MG capsule     levETIRAcetam (KEPPRA) 1000 MG tablet     levETIRAcetam (KEPPRA) 250 MG tablet     loperamide (IMODIUM) 2 MG capsule     melatonin 1 MG TABS tablet     multivitamin, therapeutic (THERA-VIT) TABS tablet     ondansetron (ZOFRAN) 4 MG tablet     pantoprazole (PROTONIX) 40 MG EC tablet     rivaroxaban ANTICOAGULANT (XARELTO ANTICOAGULANT) 20 MG TABS tablet     senna (SENOKOT) 8.6 MG tablet     albuterol (PROVENTIL) (2.5 MG/3ML) 0.083% neb solution     docosanol (ABREVA) 10 % CREA cream     ketoconazole (NIZORAL) 2 % external shampoo     Methylphenidate HCl (RITALIN PO)     No current facility-administered medications for this visit.     Current Outpatient Medications   Medication Sig Dispense Refill     acetaminophen (TYLENOL) 500 MG tablet Take 500 mg by mouth every 4 hours as needed for mild pain        albuterol (PROAIR HFA/PROVENTIL HFA/VENTOLIN HFA) 108 (90 Base) MCG/ACT inhaler Inhale 2 puffs into the lungs every 4 hours as needed for wheezing 18 g 3     amLODIPine (NORVASC) 5 MG tablet Take 1 tablet (5 mg) by mouth daily (Patient taking differently: Take 5 mg by mouth daily Hold if SBP >160 or DBP <90)       busPIRone HCl (BUSPAR) 30 MG tablet Take 30 mg by mouth 2 times daily       FLUoxetine (PROZAC) 20 MG capsule Take 2 capsules (40 mg) by mouth daily 30 capsule 0     levETIRAcetam (KEPPRA) 1000 MG tablet Take 1,000 mg by mouth 2 times daily Give with 250mg tab = 1250mg total dose twice daily       levETIRAcetam (KEPPRA) 250 MG tablet Take 250 mg by mouth 2 times daily Give with 100mg tab = 1250mg       loperamide  (IMODIUM) 2 MG capsule TAKE 2 CAPSULES (4MG) BY MOUTH AFTER FIRST LOOSE STOOL, THEN;TAKE 1 CAPSULE AFTER CONSECUTIVE STOOLS, MAX 4 CAPS/24H       melatonin 1 MG TABS tablet Take 1 tablet (1 mg) by mouth nightly as needed for sleep (Patient taking differently: Take 5 mg by mouth nightly as needed for sleep)       multivitamin, therapeutic (THERA-VIT) TABS tablet Take 1 tablet by mouth daily       ondansetron (ZOFRAN) 4 MG tablet Take 1 tablet (4 mg) by mouth every 8 hours as needed for nausea 30 tablet 3     pantoprazole (PROTONIX) 40 MG EC tablet Take 1 tablet (40 mg) by mouth 2 times daily (before meals) 30 tablet 0     rivaroxaban ANTICOAGULANT (XARELTO ANTICOAGULANT) 20 MG TABS tablet Take 1 tablet (20 mg) by mouth daily (with dinner) 30 tablet 3     senna (SENOKOT) 8.6 MG tablet Senokot 8.6 mg tablet       albuterol (PROVENTIL) (2.5 MG/3ML) 0.083% neb solution Take 1 vial (2.5 mg) by nebulization every 4 hours as needed for wheezing or shortness of breath / dyspnea (Patient not taking: Reported on 4/18/2023)       docosanol (ABREVA) 10 % CREA cream Apply topically 5 times daily Apply to cold sore on lip (Patient not taking: Reported on 4/18/2023)       ketoconazole (NIZORAL) 2 % external shampoo Apply topically twice a week On Wed & Sun (Patient not taking: Reported on 4/18/2023)       Methylphenidate HCl (RITALIN PO) Take 5 mg by mouth every morning (Patient not taking: Reported on 4/18/2023)       DRUG ALLERGIES   Allergies   Allergen Reactions     Pilocarpine Headache and Nausea and Vomiting     Chlorhexidine Itching and Rash     Aripiprazole Headache and Other (See Comments)     Insomnia, nightmares     Bacitracin Rash     Bactroban is effective; No difficulties     Erythromycin      intolerant.     Meperidine Hcl      intolerant only   Demerol     Chloraprep One Step Rash       IMMUNIZATIONS   Immunization History   Administered Date(s) Administered     COVID-19 Vaccine 18+ (Moderna) 03/18/2021, 07/22/2021,  01/18/2022     COVID-19 Vaccine Bivalent Booster 18+ (Moderna) 12/13/2022     Flu 65+ Years 09/28/2020     HepB 01/01/1990     Influenza (IIV3) PF 01/01/2001     Influenza Vaccine 65+ (Fluzone HD) 09/28/2020     Influenza Vaccine >6 months (Alfuria,Fluzone) 09/28/2020     Pneumo Conj 13-V (2010&after) 10/29/2014     Pneumococcal 23 valent 07/22/2020     TDAP Vaccine (Boostrix) 08/22/2016     Tetanus 06/13/2004     Zoster recombinant adjuvanted (SHINGRIX) 12/24/2019, 10/12/2020     Zoster vaccine, live 12/18/2008       ONCOLOGIC HISTORY  -2/2021 PRESENTATION: Progressive aphasia.   -2/19/2021 MR brain imaging with 3.3 x 2.8 x 2.8 cm enhancing mass in left frontal-parietal region. A second contrast enhancing lesion (0.5 x 1.0 x 1.0 cm) in right occipital lobe which is dural based and largely stable in size since 9/2011; likely representing a meningioma.  -2/23/2021 SURGERY: Gross total resection by Dr. Cummings  PATHOLOGY: Glioblastoma; IDH1-R132H wild-type/ IDH 1 and 2 wildtype, MGMT promoter methylated. Not BRAF mutated.   -3/23/2021 NEURO-ONC: Recommending chemoradiotherapy.   -3/2021 ADMISSION: SOB and chest pain; CT PA negative, but bilateral DVT noted on U/S. Started on Lovenox. Acute hypoxic respiratory failure in the setting of bilateral pneumonia; presumed PJP, concern for transfusion-related acute lung injury and right hemidiaphragm paralysis. Seizure. Right retroperitoneal hematoma. Ileus. Non-severe malnutrition on TPN with concern for refeeding syndrome. 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 Shock Liver.  -5/4 - 5/27/2021 RADS: 15 fractions.   -5/25/2021 NEURO-ONC/ DEVICE: Discussed the role of Optune; to be considered. Repeat MRI in 4 weeks with plans to start adjuvant temozolomide.   -6/22/2021 NEURO-ONC/ MRB/ CHEMO: Clinically improving. Imaging largely stable. Starting adjuvant temozolomide 150mg/m2 (250mg), cycle 1 (start date 6/24).   -7/20/2021 NEURO-ONC/ CHEMO: Clinically improving. Thrombocytopenia  noted with adjuvant temozolomide dosing, platelets of 26K; will transition to metronomic dosing 50mg/m2 (100mg) daily to start once platelets are > 80K.    -8/17/2021 NEURO-ONC/ MRB/ CHEMO: Clinically improving. Saw Dr. Gamboa, who recommended starting anticoagulation; on Eliquis. Imaging with positive treatment response. Continue metronomic/ daily dosing at 50mg/m2 (100mg) through 12/2021.    -9/14/2021 NEURO-ONC/ CHEMO: Clinically improving. Continue metronomic/ daily dosing at 50mg/m2 (100mg) through 12/2021.    -10/12/2021 NEURO-ONC/ CHEMO: Clinically improving. Holding temozolomide and Zofran in the setting of severe constipation. Abdominal x-ray with high stool burden; consulted Dr. Hodge for management of constipation given history of ileus.   -10/28/2021 CHEMO: Temozolomide restarted.   -11/16/2021 NEURO-ONC/ MRB/ CHEMO: Clinically improving Less constipation, continued low appetite; referral to palliative care for mental health management. Referral to Dr. Agudelo to optimize rehab. Imaging with continued positive treatment response. Continue daily temozolomide.   -12/21/2021 NEURO-ONC/ CHEMO: Clinically improving in terms of appetite, energy. Stopping daily temozolomide at the end of the month. Discussion with Dr. Gamboa regarding removal of IVC filter.   -2/14/2022 MRB with a stable postoperative changes of left parietal craniotomy and tumor resection.   -3/1/2022 NEURO-ONC: Clinically deconditioned, mood is depressed; following with Cancer PM&R and Palliative Care. With imaging stable, continue imaging surveillance.   -4/4/2022 MRB with stable postoperative change of left-sided craniotomy and tumor resection.  -7/19/2022 NEURO-ONC/ MRB: Clinically improving. Imaging with no overt evidence of cancer recurrence, new punctate area of contrast enhancement of indeterminate significance; continue imaging surveillance.   -10/4/2022 NEURO-ONC/ MRB: Clinically with improved cognition. Recommend stopping ritalin in  "light of no known benefit and worsening tremor. Reestablish care with Dr. Agudelo. Imaging with no overt evidence of cancer recurrence, the punctate area of contrast enhancement nearly resolved; continue imaging surveillance.   -1/17/2023 NEURO-ONC/ MRB: Stable neurological deficits; recommending neuropsych evaluation to objectively assess cognition and capacity. Imaging with no overt evidence of cancer recurrence.  -4/18/2023 NEURO-ONC/ MRB: Decrease in Hb. Completed neuro-psych evaluation; diagnosed as having dementia and is not capable of independent decision-making. Imaging with no new concerns.       PHYSICAL EXAMINATION  /84 (BP Location: Right arm, Patient Position: Sitting, Cuff Size: Adult Large)   Pulse 67   Temp 98.1  F (36.7  C) (Oral)   Resp 18   Wt 66.2 kg (146 lb)   SpO2 94%   BMI 21.56 kg/m    Wt Readings from Last 2 Encounters:   04/18/23 66.2 kg (146 lb)   01/17/23 64.6 kg (142 lb 6.7 oz)      Ht Readings from Last 2 Encounters:   06/03/22 1.753 m (5' 9\")   04/05/22 1.6 m (5' 3\")     KPS: 50-60    -Good energy today. Engaged in the conversation.   -Psychiatric: Good mood, reactive affect. Pleasant.  -Neurologic:   MENTAL STATUS:     Alert.   Speech fluent, but with some speech hesitation.      CRANIAL NERVES:     Pupils are equal, round.     No facial droop.   Hearing slightly decreased bilaterally.   Normal phonation.   MOTOR: Mild bilateral pill rolling hand tremor; left >> right.   GAIT: Sitting in a wheelchair, did not assess today.        MEDICAL RECORDS  RNCC notes  PM&R notes    LABS  Personally reviewed all available lab results; CBC and CMP.     IMAGING  Personally reviewed MR brain imaging from today and compared to prior imaging. To my eye, there is no overt evidence of cancer recurrence about the left parietal resection cavity. The punctate focus of enhancement within the brain parenchyma deep and posterior to the resection cavity has remained stable. There is no new concerning " lesions.     Unchanged dural-based mass along the right occipital lobe, presumably meningioma.    Diffuse cerebral volume loss and cerebral white matter changes consistent with chronic small vessel ischemic disease.     Imaging was shown to and results were reviewed with Olga and her .       IMPRESSION  Clinic time of 40 minutes reviewing data, in evaluation, and coordinating care for this high complexity visit was spent discussing in detail the nature of her cancer in light of repeat imaging. This was in addition to answering questions pertaining to my recommendations and devising the plan as outlined below.      Today, Olga's functional status remains stable in terms of her known neurological deficits related to her strength/ gait stability and cognition. To better understand and delineate Olga's capacity for complex decision making regarding any legal or medical choices, she completed a neuro-psych evaluation by Dr. Lantigua in March. I personally reviewed the report that found deficits consistent with near global brain dysfunction undoubtedly as a result of her cancer and treatments. She was diagnosed as having dementia with profound deficits noted in attention, verbal fluency, executive functioning, learning, free recall, repetition, cognitive speed, and bilateral psychomotor speed. It was also found that Olga is not capable of independent decision-making and will require considerable support and guidance for important decisions.    I have also reviewed Dr. gAudelo's recent note from today and have discussed Olga's case with her. Dr. Agudelo will continue to follow to optimize rehab potential.     Imaging as detailed above with no overt evidence of cancer recurrence. Therefore, the plan is to continue imaging surveillance per NCCN guidelines of every 2-4 months through at least 6/2024. At this point in time, given Olga's ongoing clinical and radiographic stability, will extend the  imaging interval to every 4 months. Olga and Fahad are in agreement with this plan, but know to call with any concerns and Olga can be seen sooner if needed.    Recent labs reviewed; no concern on CMP. On CBC from 3/1; Hb of 10.1 and platelets 169 to 3/15 with 9.5 and 200. Limited concern for hemodilution. MCV is low (94) and UA was negative for blood. I encouraged Olga to eat iron rich foods and to speak with her primary care provider about continued monitoring and potential lab work-up with iron studies (iron deficiency amenia) and a rectal occult blood test (anemia of chronic blood loss). Today in clinic, Olga was not fatigued, was not short of breath, and did not endorse chest pain.     PROBLEM LIST  Glioblastoma  Aphasia  Apraxia  Lacks capacity    PLAN  -CANCER DIRECTED THERAPY-  -Continue imaging surveillance. Repeat imaging in 4 months.    -STEROIDS-  -Off dexamethasone.    -SEIZURE MANAGEMENT-  -Given history of seizures, antiepileptics are indicated.   -Continue Keppra.     -DVT-  -Following with Dr. Gamboa; currently on Eliquis.   -Requires lifelong anticoagulation given cancer diagnosis.   -IVC filter in place.     -Quality of life/ MOOD/ FATIGUE-  -History of depressed mood, PTSD, poor motivation, and poor appetite.   -Previously followed with palliative care to assess mental health and manage medications.  -Cannot tolerate CPAP for MARCELLE.    -ANEMIA-  -Deferring to primary care for monitoring and work-up.     -REHAB NEEDS-  -PT/ OT.   -Following with Dr. Agudelo in cancer rehab.    -COGNITIVE IMPAIRMENT-  -Completed neuropsychiatric evaluation; diagnosed with dementia.   -Olga dose not have capacity for complex decision making regarding any legal or medical choices.     Return to clinic in 8/2023 months + imaging.     Stephanie Baker MD  Neuro-oncology

## 2023-04-18 ENCOUNTER — ONCOLOGY VISIT (OUTPATIENT)
Dept: ONCOLOGY | Facility: CLINIC | Age: 78
End: 2023-04-18
Attending: PSYCHIATRY & NEUROLOGY
Payer: MEDICARE

## 2023-04-18 ENCOUNTER — HOSPITAL ENCOUNTER (OUTPATIENT)
Dept: MRI IMAGING | Facility: CLINIC | Age: 78
Discharge: HOME | End: 2023-04-18
Attending: PSYCHIATRY & NEUROLOGY | Admitting: PSYCHIATRY & NEUROLOGY
Payer: MEDICARE

## 2023-04-18 VITALS
WEIGHT: 146 LBS | OXYGEN SATURATION: 94 % | HEART RATE: 67 BPM | RESPIRATION RATE: 18 BRPM | SYSTOLIC BLOOD PRESSURE: 139 MMHG | BODY MASS INDEX: 21.56 KG/M2 | TEMPERATURE: 98.1 F | DIASTOLIC BLOOD PRESSURE: 84 MMHG

## 2023-04-18 DIAGNOSIS — C71.9 GLIOBLASTOMA (H): ICD-10-CM

## 2023-04-18 DIAGNOSIS — C71.9 GLIOBLASTOMA (H): Primary | ICD-10-CM

## 2023-04-18 PROCEDURE — 99215 OFFICE O/P EST HI 40 MIN: CPT | Performed by: PSYCHIATRY & NEUROLOGY

## 2023-04-18 PROCEDURE — A9585 GADOBUTROL INJECTION: HCPCS | Performed by: PSYCHIATRY & NEUROLOGY

## 2023-04-18 PROCEDURE — G0463 HOSPITAL OUTPT CLINIC VISIT: HCPCS | Performed by: PSYCHIATRY & NEUROLOGY

## 2023-04-18 PROCEDURE — 70553 MRI BRAIN STEM W/O & W/DYE: CPT

## 2023-04-18 PROCEDURE — 255N000002 HC RX 255 OP 636: Performed by: PSYCHIATRY & NEUROLOGY

## 2023-04-18 RX ORDER — GADOBUTROL 604.72 MG/ML
15 INJECTION INTRAVENOUS ONCE
Status: COMPLETED | OUTPATIENT
Start: 2023-04-18 | End: 2023-04-18

## 2023-04-18 RX ORDER — BUSPIRONE HYDROCHLORIDE 30 MG/1
TABLET ORAL
COMMUNITY
Start: 2022-03-11 | End: 2023-04-18

## 2023-04-18 RX ORDER — ACETAMINOPHEN 500 MG
TABLET ORAL
COMMUNITY
Start: 2021-09-30 | End: 2023-04-18

## 2023-04-18 RX ORDER — SENNOSIDES A AND B 8.6 MG/1
TABLET, FILM COATED ORAL
Status: ON HOLD | COMMUNITY
Start: 2021-07-15 | End: 2023-11-27

## 2023-04-18 RX ORDER — AMLODIPINE BESYLATE 5 MG/1
TABLET ORAL
COMMUNITY
Start: 2021-08-01 | End: 2023-04-18

## 2023-04-18 RX ADMIN — GADOBUTROL 15 ML: 604.72 INJECTION INTRAVENOUS at 13:36

## 2023-04-18 ASSESSMENT — PAIN SCALES - GENERAL: PAINLEVEL: NO PAIN (0)

## 2023-04-18 NOTE — PROGRESS NOTES
"Oncology Rooming Note    April 18, 2023 2:51 PM   Olga Bailey is a 77 year old female who presents for:    Chief Complaint   Patient presents with     Oncology Clinic Visit     GBM (glioblastoma multiforme) (H)       Initial Vitals: /84 (BP Location: Right arm, Patient Position: Sitting, Cuff Size: Adult Large)   Pulse 67   Temp 98.1  F (36.7  C) (Oral)   Resp 18   Wt 66.2 kg (146 lb)   SpO2 94%   BMI 21.56 kg/m   Estimated body mass index is 21.56 kg/m  as calculated from the following:    Height as of 6/3/22: 1.753 m (5' 9\").    Weight as of this encounter: 66.2 kg (146 lb). Body surface area is 1.8 meters squared.  No Pain (0) Comment: Data Unavailable   No LMP recorded. Patient has had a hysterectomy.  Allergies reviewed: Yes  Medications reviewed: Yes    Medications: Medication refills not needed today.  Pharmacy name entered into EPIC: Data Unavailable    Clinical concerns: no        Paula Turner CMA              "

## 2023-04-18 NOTE — LETTER
4/18/2023         RE: Olga Bailey  [address]  [facility] [address]        Dear Colleague,    Thank you for referring your patient, Olga Bailey, to the [facility]. Please see a copy of my visit note below.    NEURO-ONCOLOGY VISIT  Apr 18, 2023    CHIEF COMPLAINT: Ms. Olga Bailey is a 77 year old right-handed woman with a left frontoparietal glioblastoma (IDH wild-type, MGMT promoter methylated), diagnosed following gross total resection on 2/23/2021. Initiation of upfront cancer-directed treatment was delayed due to a complicated hospitalization. Given a resolving infection and lowered functional status, radiation alone was initiated on 5/4/2021 and completed on 5/27/2021.     Olga started adjuvant therapy with 150mg/m2 dosing of temozolomide, however, cycle 1 was complicated by significant hematologic toxicity. Dosing was changed to metronomic/ daily dosing in 7/2021 and this was well tolerated. Imaging from 8/2021 and 11/2021 demonstrates positive treatment effect.    Chemotherapy was placed on a hold in 10/2021 in the setting of severe constipation, but then, resumed. She has since completed the planned 6 months of adjuvant temozolomide as of 12/2021.    Imaging since 2/2022 has been without concern for cancer recurrence, however, imaging in 7/2022 showed a punctate area of contrast enhancement of indeterminate significance. Repeat imaging in 10/2022 showed near resolution of that punctate lesion and no evidence of cancer recurrence. Imaging in 1/2023 and in 4/2023 was again without concern. The plan remains to continue on imaging surveillance.     Olga is presenting in follow-up accompanied by Fahad ().    HISTORY OF PRESENT ILLNESS  -Olga is now living at [facility], which is a good fit for her. Per Fahad, there are excellent options for socializing and the food is much better.   -She is now using a wheelchair more. Fahad thinks that  she is more strong and has been assisting him more with transfers.   -Mental processing has been more quick lately. Olga is reading more.   -Good energy.  -Mood is overall good.   -No headaches.  -No recurrent seizures since her last visit, tolerating Keppra.  -On anticoagulation with no significant signs/ symptoms of bleeding.      MEDICATIONS   Current Outpatient Medications   Medication     acetaminophen (TYLENOL) 500 MG tablet     albuterol (PROAIR HFA/PROVENTIL HFA/VENTOLIN HFA) 108 (90 Base) MCG/ACT inhaler     amLODIPine (NORVASC) 5 MG tablet     busPIRone HCl (BUSPAR) 30 MG tablet     FLUoxetine (PROZAC) 20 MG capsule     levETIRAcetam (KEPPRA) 1000 MG tablet     levETIRAcetam (KEPPRA) 250 MG tablet     loperamide (IMODIUM) 2 MG capsule     melatonin 1 MG TABS tablet     multivitamin, therapeutic (THERA-VIT) TABS tablet     ondansetron (ZOFRAN) 4 MG tablet     pantoprazole (PROTONIX) 40 MG EC tablet     rivaroxaban ANTICOAGULANT (XARELTO ANTICOAGULANT) 20 MG TABS tablet     senna (SENOKOT) 8.6 MG tablet     albuterol (PROVENTIL) (2.5 MG/3ML) 0.083% neb solution     docosanol (ABREVA) 10 % CREA cream     ketoconazole (NIZORAL) 2 % external shampoo     Methylphenidate HCl (RITALIN PO)     No current facility-administered medications for this visit.     Current Outpatient Medications   Medication Sig Dispense Refill     acetaminophen (TYLENOL) 500 MG tablet Take 500 mg by mouth every 4 hours as needed for mild pain        albuterol (PROAIR HFA/PROVENTIL HFA/VENTOLIN HFA) 108 (90 Base) MCG/ACT inhaler Inhale 2 puffs into the lungs every 4 hours as needed for wheezing 18 g 3     amLODIPine (NORVASC) 5 MG tablet Take 1 tablet (5 mg) by mouth daily (Patient taking differently: Take 5 mg by mouth daily Hold if SBP >160 or DBP <90)       busPIRone HCl (BUSPAR) 30 MG tablet Take 30 mg by mouth 2 times daily       FLUoxetine (PROZAC) 20 MG capsule Take 2 capsules (40 mg) by mouth daily 30 capsule 0      levETIRAcetam (KEPPRA) 1000 MG tablet Take 1,000 mg by mouth 2 times daily Give with 250mg tab = 1250mg total dose twice daily       levETIRAcetam (KEPPRA) 250 MG tablet Take 250 mg by mouth 2 times daily Give with 100mg tab = 1250mg       loperamide (IMODIUM) 2 MG capsule TAKE 2 CAPSULES (4MG) BY MOUTH AFTER FIRST LOOSE STOOL, THEN;TAKE 1 CAPSULE AFTER CONSECUTIVE STOOLS, MAX 4 CAPS/24H       melatonin 1 MG TABS tablet Take 1 tablet (1 mg) by mouth nightly as needed for sleep (Patient taking differently: Take 5 mg by mouth nightly as needed for sleep)       multivitamin, therapeutic (THERA-VIT) TABS tablet Take 1 tablet by mouth daily       ondansetron (ZOFRAN) 4 MG tablet Take 1 tablet (4 mg) by mouth every 8 hours as needed for nausea 30 tablet 3     pantoprazole (PROTONIX) 40 MG EC tablet Take 1 tablet (40 mg) by mouth 2 times daily (before meals) 30 tablet 0     rivaroxaban ANTICOAGULANT (XARELTO ANTICOAGULANT) 20 MG TABS tablet Take 1 tablet (20 mg) by mouth daily (with dinner) 30 tablet 3     senna (SENOKOT) 8.6 MG tablet Senokot 8.6 mg tablet       albuterol (PROVENTIL) (2.5 MG/3ML) 0.083% neb solution Take 1 vial (2.5 mg) by nebulization every 4 hours as needed for wheezing or shortness of breath / dyspnea (Patient not taking: Reported on 4/18/2023)       docosanol (ABREVA) 10 % CREA cream Apply topically 5 times daily Apply to cold sore on lip (Patient not taking: Reported on 4/18/2023)       ketoconazole (NIZORAL) 2 % external shampoo Apply topically twice a week On Wed & Sun (Patient not taking: Reported on 4/18/2023)       Methylphenidate HCl (RITALIN PO) Take 5 mg by mouth every morning (Patient not taking: Reported on 4/18/2023)       DRUG ALLERGIES   Allergies   Allergen Reactions     Pilocarpine Headache and Nausea and Vomiting     Chlorhexidine Itching and Rash     Aripiprazole Headache and Other (See Comments)     Insomnia, nightmares     Bacitracin Rash     Bactroban is effective; No difficulties      Erythromycin      intolerant.     Meperidine Hcl      intolerant only   Demerol     Chloraprep One Step Rash       IMMUNIZATIONS   Immunization History   Administered Date(s) Administered     COVID-19 Vaccine 18+ (Moderna) 03/18/2021, 07/22/2021, 01/18/2022     COVID-19 Vaccine Bivalent Booster 18+ (Moderna) 12/13/2022     Flu 65+ Years 09/28/2020     HepB 01/01/1990     Influenza (IIV3) PF 01/01/2001     Influenza Vaccine 65+ (Fluzone HD) 09/28/2020     Influenza Vaccine >6 months (Alfuria,Fluzone) 09/28/2020     Pneumo Conj 13-V (2010&after) 10/29/2014     Pneumococcal 23 valent 07/22/2020     TDAP Vaccine (Boostrix) 08/22/2016     Tetanus 06/13/2004     Zoster recombinant adjuvanted (SHINGRIX) 12/24/2019, 10/12/2020     Zoster vaccine, live 12/18/2008       ONCOLOGIC HISTORY  -2/2021 PRESENTATION: Progressive aphasia.   -2/19/2021 MR brain imaging with 3.3 x 2.8 x 2.8 cm enhancing mass in left frontal-parietal region. A second contrast enhancing lesion (0.5 x 1.0 x 1.0 cm) in right occipital lobe which is dural based and largely stable in size since 9/2011; likely representing a meningioma.  -2/23/2021 SURGERY: Gross total resection by Dr. Cummings  PATHOLOGY: Glioblastoma; IDH1-R132H wild-type/ IDH 1 and 2 wildtype, MGMT promoter methylated. Not BRAF mutated.   -3/23/2021 NEURO-ONC: Recommending chemoradiotherapy.   -3/2021 ADMISSION: SOB and chest pain; CT PA negative, but bilateral DVT noted on U/S. Started on Lovenox. Acute hypoxic respiratory failure in the setting of bilateral pneumonia; presumed PJP, concern for transfusion-related acute lung injury and right hemidiaphragm paralysis. Seizure. Right retroperitoneal hematoma. Ileus. Non-severe malnutrition on TPN with concern for refeeding syndrome. 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 Shock Liver.  -5/4 - 5/27/2021 RADS: 15 fractions.   -5/25/2021 NEURO-ONC/ DEVICE: Discussed the role of Optune; to be considered. Repeat MRI in 4 weeks with plans to start  adjuvant temozolomide.   -6/22/2021 NEURO-ONC/ MRB/ CHEMO: Clinically improving. Imaging largely stable. Starting adjuvant temozolomide 150mg/m2 (250mg), cycle 1 (start date 6/24).   -7/20/2021 NEURO-ONC/ CHEMO: Clinically improving. Thrombocytopenia noted with adjuvant temozolomide dosing, platelets of 26K; will transition to metronomic dosing 50mg/m2 (100mg) daily to start once platelets are > 80K.    -8/17/2021 NEURO-ONC/ MRB/ CHEMO: Clinically improving. Saw Dr. Gamboa, who recommended starting anticoagulation; on Eliquis. Imaging with positive treatment response. Continue metronomic/ daily dosing at 50mg/m2 (100mg) through 12/2021.    -9/14/2021 NEURO-ONC/ CHEMO: Clinically improving. Continue metronomic/ daily dosing at 50mg/m2 (100mg) through 12/2021.    -10/12/2021 NEURO-ONC/ CHEMO: Clinically improving. Holding temozolomide and Zofran in the setting of severe constipation. Abdominal x-ray with high stool burden; consulted Dr. Hodge for management of constipation given history of ileus.   -10/28/2021 CHEMO: Temozolomide restarted.   -11/16/2021 NEURO-ONC/ MRB/ CHEMO: Clinically improving Less constipation, continued low appetite; referral to palliative care for mental health management. Referral to Dr. Agudelo to optimize rehab. Imaging with continued positive treatment response. Continue daily temozolomide.   -12/21/2021 NEURO-ONC/ CHEMO: Clinically improving in terms of appetite, energy. Stopping daily temozolomide at the end of the month. Discussion with Dr. Gamboa regarding removal of IVC filter.   -2/14/2022 MRB with a stable postoperative changes of left parietal craniotomy and tumor resection.   -3/1/2022 NEURO-ONC: Clinically deconditioned, mood is depressed; following with Cancer PM&R and Palliative Care. With imaging stable, continue imaging surveillance.   -4/4/2022 MRB with stable postoperative change of left-sided craniotomy and tumor resection.  -7/19/2022 NEURO-ONC/ MRB: Clinically improving.  "Imaging with no overt evidence of cancer recurrence, new punctate area of contrast enhancement of indeterminate significance; continue imaging surveillance.   -10/4/2022 NEURO-ONC/ MRB: Clinically with improved cognition. Recommend stopping ritalin in light of no known benefit and worsening tremor. Reestablish care with Dr. Agudelo. Imaging with no overt evidence of cancer recurrence, the punctate area of contrast enhancement nearly resolved; continue imaging surveillance.   -1/17/2023 NEURO-ONC/ MRB: Stable neurological deficits; recommending neuropsych evaluation to objectively assess cognition and capacity. Imaging with no overt evidence of cancer recurrence.  -4/18/2023 NEURO-ONC/ MRB: Decrease in Hb. Completed neuro-psych evaluation; diagnosed as having dementia and is not capable of independent decision-making. Imaging with no new concerns.       PHYSICAL EXAMINATION  /84 (BP Location: Right arm, Patient Position: Sitting, Cuff Size: Adult Large)   Pulse 67   Temp 98.1  F (36.7  C) (Oral)   Resp 18   Wt 66.2 kg (146 lb)   SpO2 94%   BMI 21.56 kg/m    Wt Readings from Last 2 Encounters:   04/18/23 66.2 kg (146 lb)   01/17/23 64.6 kg (142 lb 6.7 oz)      Ht Readings from Last 2 Encounters:   06/03/22 1.753 m (5' 9\")   04/05/22 1.6 m (5' 3\")     KPS: 50-60    -Good energy today. Engaged in the conversation.   -Psychiatric: Good mood, reactive affect. Pleasant.  -Neurologic:   MENTAL STATUS:     Alert.   Speech fluent, but with some speech hesitation.      CRANIAL NERVES:     Pupils are equal, round.     No facial droop.   Hearing slightly decreased bilaterally.   Normal phonation.   MOTOR: Mild bilateral pill rolling hand tremor; left >> right.   GAIT: Sitting in a wheelchair, did not assess today.        MEDICAL RECORDS  RNCC notes  PM&R notes    LABS  Personally reviewed all available lab results; CBC and CMP.     IMAGING  Personally reviewed MR brain imaging from today and compared to prior imaging. " To my eye, there is no overt evidence of cancer recurrence about the left parietal resection cavity. The punctate focus of enhancement within the brain parenchyma deep and posterior to the resection cavity has remained stable. There is no new concerning lesions.     Unchanged dural-based mass along the right occipital lobe, presumably meningioma.    Diffuse cerebral volume loss and cerebral white matter changes consistent with chronic small vessel ischemic disease.     Imaging was shown to and results were reviewed with Olga and her .       IMPRESSION  Clinic time of 40 minutes reviewing data, in evaluation, and coordinating care for this high complexity visit was spent discussing in detail the nature of her cancer in light of repeat imaging. This was in addition to answering questions pertaining to my recommendations and devising the plan as outlined below.      Today, Olga's functional status remains stable in terms of her known neurological deficits related to her strength/ gait stability and cognition. To better understand and delineate Olga's capacity for complex decision making regarding any legal or medical choices, she completed a neuro-psych evaluation by Dr. Lantigua in March. I personally reviewed the report that found deficits consistent with near global brain dysfunction undoubtedly as a result of her cancer and treatments. She was diagnosed as having dementia with profound deficits noted in attention, verbal fluency, executive functioning, learning, free recall, repetition, cognitive speed, and bilateral psychomotor speed. It was also found that Olga is not capable of independent decision-making and will require considerable support and guidance for important decisions.    I have also reviewed Dr. Agudelo's recent note from today and have discussed Olga's case with her. Dr. Agudelo will continue to follow to optimize rehab potential.     Imaging as detailed above with no overt  evidence of cancer recurrence. Therefore, the plan is to continue imaging surveillance per NCCN guidelines of every 2-4 months through at least 6/2024. At this point in time, given Olga's ongoing clinical and radiographic stability, will extend the imaging interval to every 4 months. Olga and Fahad are in agreement with this plan, but know to call with any concerns and Olga can be seen sooner if needed.    Recent labs reviewed; no concern on CMP. On CBC from 3/1; Hb of 10.1 and platelets 169 to 3/15 with 9.5 and 200. Limited concern for hemodilution. MCV is low (94) and UA was negative for blood. I encouraged Olga to eat iron rich foods and to speak with her primary care provider about continued monitoring and potential lab work-up with iron studies (iron deficiency amenia) and a rectal occult blood test (anemia of chronic blood loss). Today in clinic, Olga was not fatigued, was not short of breath, and did not endorse chest pain.     PROBLEM LIST  Glioblastoma  Aphasia  Apraxia  Lacks capacity    PLAN  -CANCER DIRECTED THERAPY-  -Continue imaging surveillance. Repeat imaging in 4 months.    -STEROIDS-  -Off dexamethasone.    -SEIZURE MANAGEMENT-  -Given history of seizures, antiepileptics are indicated.   -Continue Keppra.     -DVT-  -Following with Dr. Gamboa; currently on Eliquis.   -Requires lifelong anticoagulation given cancer diagnosis.   -IVC filter in place.     -Quality of life/ MOOD/ FATIGUE-  -History of depressed mood, PTSD, poor motivation, and poor appetite.   -Previously followed with palliative care to assess mental health and manage medications.  -Cannot tolerate CPAP for MARCELLE.    -ANEMIA-  -Deferring to primary care for monitoring and work-up.     -REHAB NEEDS-  -PT/ OT.   -Following with Dr. Agudelo in cancer rehab.    -COGNITIVE IMPAIRMENT-  -Completed neuropsychiatric evaluation; diagnosed with dementia.   -Olga dose not have capacity for complex decision making regarding  "any legal or medical choices.     Return to clinic in 8/2023 months + imaging.     Stephanie Baker MD  Neuro-oncology      Oncology Rooming Note    April 18, 2023 2:51 PM   Olga Bailey is a 77 year old female who presents for:    Chief Complaint   Patient presents with     Oncology Clinic Visit     GBM (glioblastoma multiforme) (H)       Initial Vitals: /84 (BP Location: Right arm, Patient Position: Sitting, Cuff Size: Adult Large)   Pulse 67   Temp 98.1  F (36.7  C) (Oral)   Resp 18   Wt 66.2 kg (146 lb)   SpO2 94%   BMI 21.56 kg/m   Estimated body mass index is 21.56 kg/m  as calculated from the following:    Height as of 6/3/22: 1.753 m (5' 9\").    Weight as of this encounter: 66.2 kg (146 lb). Body surface area is 1.8 meters squared.  No Pain (0) Comment: Data Unavailable   No LMP recorded. Patient has had a hysterectomy.  Allergies reviewed: Yes  Medications reviewed: Yes    Medications: Medication refills not needed today.  Pharmacy name entered into EPIC: Data Unavailable    Clinical concerns: no        Paula Turner CMA                  Again, thank you for allowing me to participate in the care of your patient.        Sincerely,        Stephanie Baker MD    "

## 2023-04-18 NOTE — PROGRESS NOTES
Called by MRI staff to assess IV infiltration of Gadavist.     Left AC IV already removed when writer arrived to assess. Pt reports dull pain to touch at infiltration site, otherwise denies pain. Skin slightly edematous, no visual discoloring. Infiltration border marked with skin marker, ice applied, LUE elevated, written instructions for contrast media infiltration printed and reviewed with patient and spouse. Questions answered, assisted patient with getting dressed for oncology appointment following MRI. Patient discharged by MRI staff.

## 2023-04-18 NOTE — DISCHARGE INSTRUCTIONS
IV Contrast Extravasation    During your procedure, some of the IV contrast dye leaked from the blood vessel into the surrounding tissues.  This is called contrast extravasation.    Gravity may move this extra fluid down into your arm or hand.  This causes swelling.  A little swelling is normal.    You may have some pain or discomfort at the needle site over the next few days.      Try to keep your arm raised above heart level for the next 3 hours.  If you have pain, apply a cold pack to the area for 30 minutes, three times a day. Do this for the next 1-2 days, or as long as the pain lasts. Never apply ice directly on your skin. Place a thin towel between the cold pack & your skin.  Call your primary care provider if the pain gets worse or you have any other problems.    Check the IV site and arm for:    Increased pain or swelling.  If you have swelling, use a marker to librado your skin on both sides of the swollen area.  Measure the area.  Do this once or twice a day until the swelling decreases. If the area gets bigger, call your provider  Blisters or blanching (skin that turns white or changes color)  Tingling or loss of feeling in the arm       If you have any of these symptoms, call your provider right away or go to the emergency room.        If you have questions call:          [facility] Radiology Dept @ [phone]

## 2023-04-25 ENCOUNTER — VIRTUAL VISIT (OUTPATIENT)
Dept: ONCOLOGY | Facility: CLINIC | Age: 78
End: 2023-04-25
Attending: STUDENT IN AN ORGANIZED HEALTH CARE EDUCATION/TRAINING PROGRAM
Payer: MEDICARE

## 2023-04-25 DIAGNOSIS — R53.81 PHYSICAL DECONDITIONING: ICD-10-CM

## 2023-04-25 DIAGNOSIS — C71.9 GLIOBLASTOMA (H): Primary | ICD-10-CM

## 2023-04-25 DIAGNOSIS — R41.89 COGNITIVE CHANGE: ICD-10-CM

## 2023-04-25 DIAGNOSIS — R26.81 GAIT INSTABILITY: ICD-10-CM

## 2023-04-25 DIAGNOSIS — R53.0 NEOPLASTIC MALIGNANT RELATED FATIGUE: ICD-10-CM

## 2023-04-25 PROCEDURE — 99215 OFFICE O/P EST HI 40 MIN: CPT | Mod: VID | Performed by: STUDENT IN AN ORGANIZED HEALTH CARE EDUCATION/TRAINING PROGRAM

## 2023-04-25 PROCEDURE — G0463 HOSPITAL OUTPT CLINIC VISIT: HCPCS | Mod: PN,GT | Performed by: STUDENT IN AN ORGANIZED HEALTH CARE EDUCATION/TRAINING PROGRAM

## 2023-04-25 NOTE — PATIENT INSTRUCTIONS
Continue home PT and OT. A speech therapy referral order was placed to help with your ongoing cognitive difficulties.  Follow up with Dr. Agudelo in August around your appointment with Dr. Baker.

## 2023-04-25 NOTE — LETTER
4/25/2023         RE: Olga Bailey  [address]  [facility] [address]        Dear Colleague,    Thank you for referring your patient, Olga Bailey, to the [facility] CANCER [facility]. Please see a copy of my visit note below.    Virtual Visit Details    Type of service:  Video Visit   Video Start Time: 4:24 PM  Video End Time:4:42 PM    Originating Location (pt. Location): Home  Distant Location (provider location):  On-site  Platform used for Video Visit: [facility]   PM&R clinic note        Interval history:     Olga Bailey presents to clinic today for follow up reg her rehab needs.   She has h/o left frontoparietal glioblastoma (IDH wild-type, MGMT promoter methylated).  Was last seen in clinic on 1/17/23.  Recommendations included   1.           Therapy/equipment/braces:  1.    Continue physical therapy at current facility to initiate more aggressive physical therapy regimen in an view of her clinical improvement and functional potential.  2.    Continue protein supplementation daily as tolerated.  3.    Neuropsychology referral was placed with Dr. Lantigua for repeat Neuropsych testing.   4.    Family will reach out once she is moved to a new facility so we can communicate with physical therapy team there/place the appropriate recommendations as needed.  2.           Follow up: 3 months.    Oncologic History:    2/2021 PRESENTATION: Progressive aphasia.     2/19/2021 MR brain imaging with 3.3 x 2.8 x 2.8 cm enhancing mass in left frontal-parietal region. A second contrast enhancing lesion (0.5 x 1.0 x 1.0 cm) in right occipital lobe which is dural based and largely stable in size since 9/2011; likely representing a meningioma.    2/23/2021 SURGERY: Gross total resection by Dr. Cummings    PATHOLOGY: Glioblastoma; IDH1-R132H wild-type/ IDH 1 and 2 wildtype, MGMT promoter methylated. Not BRAF mutated.     3/23/2021 NEURO-ONC: Recommending  chemoradiotherapy.     3/2021 ADMISSION: SOB and chest pain; CT PA negative, but bilateral DVT noted on U/S. Started on Lovenox. Acute hypoxic respiratory failure in the setting of bilateral pneumonia; presumed PJP, concern for transfusion-related acute lung injury and right hemidiaphragm paralysis. Seizure. Right retroperitoneal hematoma. Ileus. Non-severe malnutrition on TPN with concern for refeeding syndrome. 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 Shock Liver.    5/4 - 5/27/2021 RADS: 15 fractions.     5/25/2021 NEURO-ONC/ DEVICE: Discussed the role of Optune; to be considered. Repeat MRI in 4 weeks with plans to start adjuvant temozolomide.     6/22/2021 NEURO-ONC/ MRB/ CHEMO: Clinically improving. Imaging largely stable. Starting adjuvant temozolomide 150mg/m2 (250mg), cycle 1 (start date 6/24).     7/20/2021 NEURO-ONC/ CHEMO: Clinically improving. Thrombocytopenia noted with adjuvant temozolomide dosing, platelets of 26K; will transition to metronomic dosing 50mg/m2 (100mg) daily to start once platelets are > 80K.      8/17/2021 NEURO-ONC/ MRB/ CHEMO: Clinically improving. Saw Dr. Gamboa, who recommended starting anticoagulation; on Eliquis. Imaging with positive treatment response. Continue metronomic/ daily dosing at 50mg/m2 (100mg) through 12/2021.      9/14/2021 NEURO-ONC/ CHEMO: Clinically improving. Continue metronomic/ daily dosing at 50mg/m2 (100mg) through 12/2021.      10/12/2021 NEURO-ONC/ CHEMO: Clinically improving. Holding temozolomide and Zofran in the setting of severe constipation. Abdominal x-ray with high stool burden; consulted Dr. Hodge for management of constipation given history of ileus.     10/28/2021 CHEMO: Temozolomide restarted.     11/16/2021 NEURO-ONC/ MRB/ CHEMO: Clinically improving Less constipation, continued low appetite; referral to palliative care for mental health management. Referral to Dr. Agudelo to optimize rehab. Imaging with continued positive treatment response. Continue  daily temozolomide.     3/1/2022 follow-up with Dr. Gay-patient doing overall okay.  Sleep and energy are better.  Following with palliative care and has Prozac and BuSpar, and mood is better.  Home therapies had stopped about 4 weeks ago, and interested in restarting.  She has had recent fall due to instability in her gait when she presented to the ER and imaging did not reveal any head injury or other injuries that were sustained.  Also presented to the ER as was leaning to the right, and there was concern for stroke however stroke work-up was negative.    Follow-up visit with Dr. Baker on 10/4/22.  Clinically improved cognition.  Recommended stopping Ritalin in light of no known benefit and worsening tremor.  Reestablish care with Dr. Agudelo.  Imaging with no overt evidence of cancer recurrence, the punctate area of contrast-enhancement nearly resolved.  Continue imaging surveillance.    1/17/23- Visit with Dr. Baker. Clinically with stable neurological deficits. Imaging with no overt evidence of cancer recurrence.    3/9/23- Neuropsych testing with Dr. Lantigua. Ms. Bailey demonstrated deficits that are consistent with near global brain dysfunction, particularly for frontal, subcortical, left parietal and right hemispheric networks. Deficits are due to tumor and treatments. Also possible contributions from cerbrovascular disease. Profound deficits noted in attention, verbal fluency, executive functioning, learning, free recall, repetition, cognitive speed and bilateral psychomotor speed. Evaluation was completed in clinical context. With that said, do not think she is capable of independent decision making and will require considerable support and guidance for important decisions.    4/18/23- Return visit with Dr. Baker. Decrease in Hb. Completed neuro-psych evaluation; diagnosed as having dementia and is not capable of independent decision-making. Imaging with no new concerns.       Symptoms,  Patient presents for a  return visit today.  Patient's  is present for the visit.  Overall, she is doing better from a rehabilitation perspective since her last visit.  She has since moved into a new facility, she feels this is better for her.  She has been working with physical therapy twice weekly and Occupational Therapy, the frequency Occupational Therapy has decreased recently.  She has been able to walk short distances using her walker.  She had 1 recent fall last week when she was at the [facility], and she slipped on wet cruz along with a more cramping.  She did not sustain any injuries, did not hit her head as per history from her husban visit was d.  They would like to address difficulties that she has been having.  Her cognitive difficulties are at baseline, not worsened.  Follow level of service yes evaluate for active LV new idea yet we definitely spends exactly the next morning the      Therapies/HEP,  Participating home-based PT and OT.      Functionally,   Improvement in mobility since last visit.      Social history is unchanged.      Medications:  Current Outpatient Medications   Medication Sig Dispense Refill     acetaminophen (TYLENOL) 500 MG tablet Take 500 mg by mouth every 4 hours as needed for mild pain        albuterol (PROAIR HFA/PROVENTIL HFA/VENTOLIN HFA) 108 (90 Base) MCG/ACT inhaler Inhale 2 puffs into the lungs every 4 hours as needed for wheezing 18 g 3     albuterol (PROVENTIL) (2.5 MG/3ML) 0.083% neb solution Take 1 vial (2.5 mg) by nebulization every 4 hours as needed for wheezing or shortness of breath / dyspnea (Patient not taking: Reported on 4/18/2023)       amLODIPine (NORVASC) 5 MG tablet Take 1 tablet (5 mg) by mouth daily (Patient taking differently: Take 5 mg by mouth daily Hold if SBP >160 or DBP <90)       busPIRone HCl (BUSPAR) 30 MG tablet Take 30 mg by mouth 2 times daily       docosanol (ABREVA) 10 % CREA cream Apply topically 5 times daily Apply to cold sore on lip (Patient not  taking: Reported on 4/18/2023)       FLUoxetine (PROZAC) 20 MG capsule Take 2 capsules (40 mg) by mouth daily 30 capsule 0     ketoconazole (NIZORAL) 2 % external shampoo Apply topically twice a week On Wed & Sun (Patient not taking: Reported on 4/18/2023)       levETIRAcetam (KEPPRA) 1000 MG tablet Take 1,000 mg by mouth 2 times daily Give with 250mg tab = 1250mg total dose twice daily       levETIRAcetam (KEPPRA) 250 MG tablet Take 250 mg by mouth 2 times daily Give with 100mg tab = 1250mg       loperamide (IMODIUM) 2 MG capsule TAKE 2 CAPSULES (4MG) BY MOUTH AFTER FIRST LOOSE STOOL, THEN;TAKE 1 CAPSULE AFTER CONSECUTIVE STOOLS, MAX 4 CAPS/24H       melatonin 1 MG TABS tablet Take 1 tablet (1 mg) by mouth nightly as needed for sleep (Patient taking differently: Take 5 mg by mouth nightly as needed for sleep)       Methylphenidate HCl (RITALIN PO) Take 5 mg by mouth every morning (Patient not taking: Reported on 4/18/2023)       multivitamin, therapeutic (THERA-VIT) TABS tablet Take 1 tablet by mouth daily       ondansetron (ZOFRAN) 4 MG tablet Take 1 tablet (4 mg) by mouth every 8 hours as needed for nausea 30 tablet 3     pantoprazole (PROTONIX) 40 MG EC tablet Take 1 tablet (40 mg) by mouth 2 times daily (before meals) 30 tablet 0     rivaroxaban ANTICOAGULANT (XARELTO ANTICOAGULANT) 20 MG TABS tablet Take 1 tablet (20 mg) by mouth daily (with dinner) 30 tablet 3     senna (SENOKOT) 8.6 MG tablet Senokot 8.6 mg tablet                Physical Exam:   There were no vitals taken for this visit.     Gen: NAD, pleasant and cooperative   HEENT: Atraumatic, normocephalic, extraocular movements appear intact  Pulm: non-labored breathing in room air  Neuro/MSK:   Orientation: Oriented to person, time, place and situation, Exhibits good insight into her condition and ongoing treatment  Motor: Observed moving upper extremities actively against gravity      Labs/Imaging:  Lab Results   Component Value Date    WBC 7.4  01/13/2023    HGB 10.7 (L) 01/13/2023    HCT 31.9 (L) 01/13/2023    MCV 93 01/13/2023     01/13/2023     Lab Results   Component Value Date     01/13/2023    POTASSIUM 3.9 01/13/2023    CHLORIDE 106 01/13/2023    CO2 28 01/13/2023    GLC 93 01/13/2023     Lab Results   Component Value Date    GFRESTIMATED 74 01/13/2023    GFRESTBLACK >90 07/09/2021     Lab Results   Component Value Date    AST 19 01/13/2023    ALT 20 01/13/2023    ALKPHOS 82 01/13/2023    BILITOTAL 0.3 01/13/2023     Lab Results   Component Value Date    INR 1.44 (H) 02/14/2022     Lab Results   Component Value Date    BUN 16 01/13/2023    CR 0.81 01/13/2023              Assessment/Plan   Olga Bailey presents to clinic today for follow up reg her rehab needs. She has h/o left frontoparietal glioblastoma (IDH wild-type, MGMT promoter methylated). Was last seen in clinic on 1/17/23.  Current rehabilitation considerations were discussed with Olga and her  at today's visit.  She is overall doing better from a rehabilitation perspective in terms of mobility, and continues to work with home-based PT and OT.  We discussed adding SLP for ongoing cognitive deficits and strategies to help with these, and they are interested in this so the referral order was placed today.  We will plan a visit at the time Dr. Baker's visit in August 2023.  Olga and her  are in agreement with this plan.      1. Therapy/equipment/braces:  1. Continue home based PT and OT at current frequency.  2. Home SLP referral orders were placed for cognitive deficits.  3. Neuropsychology testing was reviewed at today's visit.  2. Follow up: August 2023      Yesenia Agudelo MD  Physical Medicine & Rehabilitation      50 minutes spent on the date of the encounter doing chart review, history and exam, documentation and further activities as noted above.              Again, thank you for allowing me to participate in the care of your patient.         Sincerely,        Yesenia Agudelo MD

## 2023-04-25 NOTE — LETTER
4/25/2023         RE: Olga Bailey  [address]  [facility] [address]        Dear Colleague,    Thank you for referring your patient, Olga Bailey, to the [facility]CER [facility]. Please see a copy of my visit note below.    Virtual Visit Details    Type of service:  Video Visit   Video Start Time: 4:24 PM  Video End Time:4:42 PM    Originating Location (pt. Location): Home  Distant Location (provider location):  On-site  Platform used for Video Visit: [facility]   PM&R clinic note        Interval history:     Olga Bailey presents to clinic today for follow up reg her rehab needs.   She has h/o left frontoparietal glioblastoma (IDH wild-type, MGMT promoter methylated).  Was last seen in clinic on 1/17/23.  Recommendations included   1.           Therapy/equipment/braces:  1.    Continue physical therapy at current facility to initiate more aggressive physical therapy regimen in an view of her clinical improvement and functional potential.  2.    Continue protein supplementation daily as tolerated.  3.    Neuropsychology referral was placed with Dr. Lantigua for repeat Neuropsych testing.   4.    Family will reach out once she is moved to a new facility so we can communicate with physical therapy team there/place the appropriate recommendations as needed.  2.           Follow up: 3 months.    Oncologic History:    2/2021 PRESENTATION: Progressive aphasia.     2/19/2021 MR brain imaging with 3.3 x 2.8 x 2.8 cm enhancing mass in left frontal-parietal region. A second contrast enhancing lesion (0.5 x 1.0 x 1.0 cm) in right occipital lobe which is dural based and largely stable in size since 9/2011; likely representing a meningioma.    2/23/2021 SURGERY: Gross total resection by Dr. Cummings    PATHOLOGY: Glioblastoma; IDH1-R132H wild-type/ IDH 1 and 2 wildtype, MGMT promoter methylated. Not BRAF mutated.     3/23/2021 NEURO-ONC: Recommending  chemoradiotherapy.     3/2021 ADMISSION: SOB and chest pain; CT PA negative, but bilateral DVT noted on U/S. Started on Lovenox. Acute hypoxic respiratory failure in the setting of bilateral pneumonia; presumed PJP, concern for transfusion-related acute lung injury and right hemidiaphragm paralysis. Seizure. Right retroperitoneal hematoma. Ileus. Non-severe malnutrition on TPN with concern for refeeding syndrome. 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 Shock Liver.    5/4 - 5/27/2021 RADS: 15 fractions.     5/25/2021 NEURO-ONC/ DEVICE: Discussed the role of Optune; to be considered. Repeat MRI in 4 weeks with plans to start adjuvant temozolomide.     6/22/2021 NEURO-ONC/ MRB/ CHEMO: Clinically improving. Imaging largely stable. Starting adjuvant temozolomide 150mg/m2 (250mg), cycle 1 (start date 6/24).     7/20/2021 NEURO-ONC/ CHEMO: Clinically improving. Thrombocytopenia noted with adjuvant temozolomide dosing, platelets of 26K; will transition to metronomic dosing 50mg/m2 (100mg) daily to start once platelets are > 80K.      8/17/2021 NEURO-ONC/ MRB/ CHEMO: Clinically improving. Saw Dr. Gamboa, who recommended starting anticoagulation; on Eliquis. Imaging with positive treatment response. Continue metronomic/ daily dosing at 50mg/m2 (100mg) through 12/2021.      9/14/2021 NEURO-ONC/ CHEMO: Clinically improving. Continue metronomic/ daily dosing at 50mg/m2 (100mg) through 12/2021.      10/12/2021 NEURO-ONC/ CHEMO: Clinically improving. Holding temozolomide and Zofran in the setting of severe constipation. Abdominal x-ray with high stool burden; consulted Dr. Hodge for management of constipation given history of ileus.     10/28/2021 CHEMO: Temozolomide restarted.     11/16/2021 NEURO-ONC/ MRB/ CHEMO: Clinically improving Less constipation, continued low appetite; referral to palliative care for mental health management. Referral to Dr. Agudelo to optimize rehab. Imaging with continued positive treatment response. Continue  daily temozolomide.     3/1/2022 follow-up with Dr. Gay-patient doing overall okay.  Sleep and energy are better.  Following with palliative care and has Prozac and BuSpar, and mood is better.  Home therapies had stopped about 4 weeks ago, and interested in restarting.  She has had recent fall due to instability in her gait when she presented to the ER and imaging did not reveal any head injury or other injuries that were sustained.  Also presented to the ER as was leaning to the right, and there was concern for stroke however stroke work-up was negative.    Follow-up visit with Dr. Baker on 10/4/22.  Clinically improved cognition.  Recommended stopping Ritalin in light of no known benefit and worsening tremor.  Reestablish care with Dr. Agudelo.  Imaging with no overt evidence of cancer recurrence, the punctate area of contrast-enhancement nearly resolved.  Continue imaging surveillance.    1/17/23- Visit with Dr. Baker. Clinically with stable neurological deficits. Imaging with no overt evidence of cancer recurrence.    3/9/23- Neuropsych testing with Dr. Lantigua. Ms. Bailey demonstrated deficits that are consistent with near global brain dysfunction, particularly for frontal, subcortical, left parietal and right hemispheric networks. Deficits are due to tumor and treatments. Also possible contributions from cerbrovascular disease. Profound deficits noted in attention, verbal fluency, executive functioning, learning, free recall, repetition, cognitive speed and bilateral psychomotor speed. Evaluation was completed in clinical context. With that said, do not think she is capable of independent decision making and will require considerable support and guidance for important decisions.    4/18/23- Return visit with Dr. Baker. Decrease in Hb. Completed neuro-psych evaluation; diagnosed as having dementia and is not capable of independent decision-making. Imaging with no new concerns.       Symptoms,  Patient presents for a  return visit today.  Patient's  is present for the visit.  Overall, she is doing better from a rehabilitation perspective since her last visit.  She has since moved into a new facility, she feels this is better for her.  She has been working with physical therapy twice weekly and Occupational Therapy, the frequency Occupational Therapy has decreased recently.  She has been able to walk short distances using her walker.  She had 1 recent fall last week when she was at the [facility], and she slipped on wet cruz along with a more cramping.  She did not sustain any injuries, did not hit her head as per history from her husban visit was d.  They would like to address difficulties that she has been having.  Her cognitive difficulties are at baseline, not worsened.  Follow level of service yes evaluate for active LV new idea yet we definitely spends exactly the next morning the      Therapies/HEP,  Participating home-based PT and OT.      Functionally,   Improvement in mobility since last visit.      Social history is unchanged.      Medications:  Current Outpatient Medications   Medication Sig Dispense Refill     acetaminophen (TYLENOL) 500 MG tablet Take 500 mg by mouth every 4 hours as needed for mild pain        albuterol (PROAIR HFA/PROVENTIL HFA/VENTOLIN HFA) 108 (90 Base) MCG/ACT inhaler Inhale 2 puffs into the lungs every 4 hours as needed for wheezing 18 g 3     albuterol (PROVENTIL) (2.5 MG/3ML) 0.083% neb solution Take 1 vial (2.5 mg) by nebulization every 4 hours as needed for wheezing or shortness of breath / dyspnea (Patient not taking: Reported on 4/18/2023)       amLODIPine (NORVASC) 5 MG tablet Take 1 tablet (5 mg) by mouth daily (Patient taking differently: Take 5 mg by mouth daily Hold if SBP >160 or DBP <90)       busPIRone HCl (BUSPAR) 30 MG tablet Take 30 mg by mouth 2 times daily       docosanol (ABREVA) 10 % CREA cream Apply topically 5 times daily Apply to cold sore on lip (Patient not  taking: Reported on 4/18/2023)       FLUoxetine (PROZAC) 20 MG capsule Take 2 capsules (40 mg) by mouth daily 30 capsule 0     ketoconazole (NIZORAL) 2 % external shampoo Apply topically twice a week On Wed & Sun (Patient not taking: Reported on 4/18/2023)       levETIRAcetam (KEPPRA) 1000 MG tablet Take 1,000 mg by mouth 2 times daily Give with 250mg tab = 1250mg total dose twice daily       levETIRAcetam (KEPPRA) 250 MG tablet Take 250 mg by mouth 2 times daily Give with 100mg tab = 1250mg       loperamide (IMODIUM) 2 MG capsule TAKE 2 CAPSULES (4MG) BY MOUTH AFTER FIRST LOOSE STOOL, THEN;TAKE 1 CAPSULE AFTER CONSECUTIVE STOOLS, MAX 4 CAPS/24H       melatonin 1 MG TABS tablet Take 1 tablet (1 mg) by mouth nightly as needed for sleep (Patient taking differently: Take 5 mg by mouth nightly as needed for sleep)       Methylphenidate HCl (RITALIN PO) Take 5 mg by mouth every morning (Patient not taking: Reported on 4/18/2023)       multivitamin, therapeutic (THERA-VIT) TABS tablet Take 1 tablet by mouth daily       ondansetron (ZOFRAN) 4 MG tablet Take 1 tablet (4 mg) by mouth every 8 hours as needed for nausea 30 tablet 3     pantoprazole (PROTONIX) 40 MG EC tablet Take 1 tablet (40 mg) by mouth 2 times daily (before meals) 30 tablet 0     rivaroxaban ANTICOAGULANT (XARELTO ANTICOAGULANT) 20 MG TABS tablet Take 1 tablet (20 mg) by mouth daily (with dinner) 30 tablet 3     senna (SENOKOT) 8.6 MG tablet Senokot 8.6 mg tablet                Physical Exam:   There were no vitals taken for this visit.     Gen: NAD, pleasant and cooperative   HEENT: Atraumatic, normocephalic, extraocular movements appear intact  Pulm: non-labored breathing in room air  Neuro/MSK:   Orientation: Oriented to person, time, place and situation, Exhibits good insight into her condition and ongoing treatment  Motor: Observed moving upper extremities actively against gravity      Labs/Imaging:  Lab Results   Component Value Date    WBC 7.4  01/13/2023    HGB 10.7 (L) 01/13/2023    HCT 31.9 (L) 01/13/2023    MCV 93 01/13/2023     01/13/2023     Lab Results   Component Value Date     01/13/2023    POTASSIUM 3.9 01/13/2023    CHLORIDE 106 01/13/2023    CO2 28 01/13/2023    GLC 93 01/13/2023     Lab Results   Component Value Date    GFRESTIMATED 74 01/13/2023    GFRESTBLACK >90 07/09/2021     Lab Results   Component Value Date    AST 19 01/13/2023    ALT 20 01/13/2023    ALKPHOS 82 01/13/2023    BILITOTAL 0.3 01/13/2023     Lab Results   Component Value Date    INR 1.44 (H) 02/14/2022     Lab Results   Component Value Date    BUN 16 01/13/2023    CR 0.81 01/13/2023              Assessment/Plan   Olga Bailey presents to clinic today for follow up reg her rehab needs. She has h/o left frontoparietal glioblastoma (IDH wild-type, MGMT promoter methylated). Was last seen in clinic on 1/17/23.  Current rehabilitation considerations were discussed with Olga and her  at today's visit.  She is overall doing better from a rehabilitation perspective in terms of mobility, and continues to work with home-based PT and OT.  We discussed adding SLP for ongoing cognitive deficits and strategies to help with these, and they are interested in this so the referral order was placed today.  We will plan a visit at the time Dr. Baker's visit in August 2023.  Olga and her  are in agreement with this plan.      1. Therapy/equipment/braces:  1. Continue home based PT and OT at current frequency.  2. Home SLP referral orders were placed for cognitive deficits.  3. Neuropsychology testing was reviewed at today's visit.  2. Follow up: August 2023      Yesenia Agudelo MD  Physical Medicine & Rehabilitation      50 minutes spent on the date of the encounter doing chart review, history and exam, documentation and further activities as noted above.              Again, thank you for allowing me to participate in the care of your patient.         Sincerely,        Yesenia Agudelo MD

## 2023-04-25 NOTE — PROGRESS NOTES
Virtual Visit Details    Type of service:  Video Visit   Video Start Time: 4:24 PM  Video End Time:4:42 PM    Originating Location (pt. Location): Home  Distant Location (provider location):  On-site  Platform used for Video Visit: [facility]   PM&R clinic note        Interval history:     Olga Bailey presents to clinic today for follow up reg her rehab needs.   She has h/o left frontoparietal glioblastoma (IDH wild-type, MGMT promoter methylated).  Was last seen in clinic on 1/17/23.  Recommendations included   1.           Therapy/equipment/braces:  1.    Continue physical therapy at current facility to initiate more aggressive physical therapy regimen in an view of her clinical improvement and functional potential.  2.    Continue protein supplementation daily as tolerated.  3.    Neuropsychology referral was placed with Dr. Lantigua for repeat Neuropsych testing.   4.    Family will reach out once she is moved to a new facility so we can communicate with physical therapy team there/place the appropriate recommendations as needed.  2.           Follow up: 3 months.    Oncologic History:    2/2021 PRESENTATION: Progressive aphasia.     2/19/2021 MR brain imaging with 3.3 x 2.8 x 2.8 cm enhancing mass in left frontal-parietal region. A second contrast enhancing lesion (0.5 x 1.0 x 1.0 cm) in right occipital lobe which is dural based and largely stable in size since 9/2011; likely representing a meningioma.    2/23/2021 SURGERY: Gross total resection by Dr. Cummings    PATHOLOGY: Glioblastoma; IDH1-R132H wild-type/ IDH 1 and 2 wildtype, MGMT promoter methylated. Not BRAF mutated.     3/23/2021 NEURO-ONC: Recommending chemoradiotherapy.     3/2021 ADMISSION: SOB and chest pain; CT PA negative, but bilateral DVT noted on U/S. Started on Lovenox. Acute hypoxic respiratory failure in the setting of bilateral pneumonia; presumed PJP, concern for transfusion-related  acute lung injury and right hemidiaphragm paralysis. Seizure. Right retroperitoneal hematoma. Ileus. Non-severe malnutrition on TPN with concern for refeeding syndrome. 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 Shock Liver.    5/4 - 5/27/2021 RADS: 15 fractions.     5/25/2021 NEURO-ONC/ DEVICE: Discussed the role of Optune; to be considered. Repeat MRI in 4 weeks with plans to start adjuvant temozolomide.     6/22/2021 NEURO-ONC/ MRB/ CHEMO: Clinically improving. Imaging largely stable. Starting adjuvant temozolomide 150mg/m2 (250mg), cycle 1 (start date 6/24).     7/20/2021 NEURO-ONC/ CHEMO: Clinically improving. Thrombocytopenia noted with adjuvant temozolomide dosing, platelets of 26K; will transition to metronomic dosing 50mg/m2 (100mg) daily to start once platelets are > 80K.      8/17/2021 NEURO-ONC/ MRB/ CHEMO: Clinically improving. Saw Dr. Gamboa, who recommended starting anticoagulation; on Eliquis. Imaging with positive treatment response. Continue metronomic/ daily dosing at 50mg/m2 (100mg) through 12/2021.      9/14/2021 NEURO-ONC/ CHEMO: Clinically improving. Continue metronomic/ daily dosing at 50mg/m2 (100mg) through 12/2021.      10/12/2021 NEURO-ONC/ CHEMO: Clinically improving. Holding temozolomide and Zofran in the setting of severe constipation. Abdominal x-ray with high stool burden; consulted Dr. Hodge for management of constipation given history of ileus.     10/28/2021 CHEMO: Temozolomide restarted.     11/16/2021 NEURO-ONC/ MRB/ CHEMO: Clinically improving Less constipation, continued low appetite; referral to palliative care for mental health management. Referral to Dr. Agudelo to optimize rehab. Imaging with continued positive treatment response. Continue daily temozolomide.     3/1/2022 follow-up with Dr. Gay-patient doing overall okay.  Sleep and energy are better.  Following with palliative care and has Prozac and BuSpar, and mood is better.  Home therapies had stopped about 4 weeks ago, and  interested in restarting.  She has had recent fall due to instability in her gait when she presented to the ER and imaging did not reveal any head injury or other injuries that were sustained.  Also presented to the ER as was leaning to the right, and there was concern for stroke however stroke work-up was negative.    Follow-up visit with Dr. Baker on 10/4/22.  Clinically improved cognition.  Recommended stopping Ritalin in light of no known benefit and worsening tremor.  Reestablish care with Dr. Agudelo.  Imaging with no overt evidence of cancer recurrence, the punctate area of contrast-enhancement nearly resolved.  Continue imaging surveillance.    1/17/23- Visit with Dr. Baker. Clinically with stable neurological deficits. Imaging with no overt evidence of cancer recurrence.    3/9/23- Neuropsych testing with Dr. Lantigua. Ms. Bailey demonstrated deficits that are consistent with near global brain dysfunction, particularly for frontal, subcortical, left parietal and right hemispheric networks. Deficits are due to tumor and treatments. Also possible contributions from cerbrovascular disease. Profound deficits noted in attention, verbal fluency, executive functioning, learning, free recall, repetition, cognitive speed and bilateral psychomotor speed. Evaluation was completed in clinical context. With that said, do not think she is capable of independent decision making and will require considerable support and guidance for important decisions.    4/18/23- Return visit with Dr. Baker. Decrease in Hb. Completed neuro-psych evaluation; diagnosed as having dementia and is not capable of independent decision-making. Imaging with no new concerns.       Symptoms,  Patient presents for a return visit today.  Patient's  is present for the visit.  Overall, she is doing better from a rehabilitation perspective since her last visit.  She has since moved into a new facility, she feels this is better for her.  She has been  working with physical therapy twice weekly and Occupational Therapy, the frequency Occupational Therapy has decreased recently.  She has been able to walk short distances using her walker.  She had 1 recent fall last week when she was at the [facility], and she slipped on wet cruz along with a more cramping.  She did not sustain any injuries, did not hit her head as per history from her husban visit was d.  They would like to address difficulties that she has been having.  Her cognitive difficulties are at baseline, not worsened.  Follow level of service yes evaluate for active LV new idea yet we definitely spends exactly the next morning the      Therapies/HEP,  Participating home-based PT and OT.      Functionally,   Improvement in mobility since last visit.      Social history is unchanged.      Medications:  Current Outpatient Medications   Medication Sig Dispense Refill     acetaminophen (TYLENOL) 500 MG tablet Take 500 mg by mouth every 4 hours as needed for mild pain        albuterol (PROAIR HFA/PROVENTIL HFA/VENTOLIN HFA) 108 (90 Base) MCG/ACT inhaler Inhale 2 puffs into the lungs every 4 hours as needed for wheezing 18 g 3     albuterol (PROVENTIL) (2.5 MG/3ML) 0.083% neb solution Take 1 vial (2.5 mg) by nebulization every 4 hours as needed for wheezing or shortness of breath / dyspnea (Patient not taking: Reported on 4/18/2023)       amLODIPine (NORVASC) 5 MG tablet Take 1 tablet (5 mg) by mouth daily (Patient taking differently: Take 5 mg by mouth daily Hold if SBP >160 or DBP <90)       busPIRone HCl (BUSPAR) 30 MG tablet Take 30 mg by mouth 2 times daily       docosanol (ABREVA) 10 % CREA cream Apply topically 5 times daily Apply to cold sore on lip (Patient not taking: Reported on 4/18/2023)       FLUoxetine (PROZAC) 20 MG capsule Take 2 capsules (40 mg) by mouth daily 30 capsule 0     ketoconazole (NIZORAL) 2 % external shampoo Apply topically twice a week On Wed & Sun (Patient not taking:  Reported on 4/18/2023)       levETIRAcetam (KEPPRA) 1000 MG tablet Take 1,000 mg by mouth 2 times daily Give with 250mg tab = 1250mg total dose twice daily       levETIRAcetam (KEPPRA) 250 MG tablet Take 250 mg by mouth 2 times daily Give with 100mg tab = 1250mg       loperamide (IMODIUM) 2 MG capsule TAKE 2 CAPSULES (4MG) BY MOUTH AFTER FIRST LOOSE STOOL, THEN;TAKE 1 CAPSULE AFTER CONSECUTIVE STOOLS, MAX 4 CAPS/24H       melatonin 1 MG TABS tablet Take 1 tablet (1 mg) by mouth nightly as needed for sleep (Patient taking differently: Take 5 mg by mouth nightly as needed for sleep)       Methylphenidate HCl (RITALIN PO) Take 5 mg by mouth every morning (Patient not taking: Reported on 4/18/2023)       multivitamin, therapeutic (THERA-VIT) TABS tablet Take 1 tablet by mouth daily       ondansetron (ZOFRAN) 4 MG tablet Take 1 tablet (4 mg) by mouth every 8 hours as needed for nausea 30 tablet 3     pantoprazole (PROTONIX) 40 MG EC tablet Take 1 tablet (40 mg) by mouth 2 times daily (before meals) 30 tablet 0     rivaroxaban ANTICOAGULANT (XARELTO ANTICOAGULANT) 20 MG TABS tablet Take 1 tablet (20 mg) by mouth daily (with dinner) 30 tablet 3     senna (SENOKOT) 8.6 MG tablet Senokot 8.6 mg tablet                Physical Exam:   There were no vitals taken for this visit.     Gen: NAD, pleasant and cooperative   HEENT: Atraumatic, normocephalic, extraocular movements appear intact  Pulm: non-labored breathing in room air  Neuro/MSK:   Orientation: Oriented to person, time, place and situation, Exhibits good insight into her condition and ongoing treatment  Motor: Observed moving upper extremities actively against gravity      Labs/Imaging:  Lab Results   Component Value Date    WBC 7.4 01/13/2023    HGB 10.7 (L) 01/13/2023    HCT 31.9 (L) 01/13/2023    MCV 93 01/13/2023     01/13/2023     Lab Results   Component Value Date     01/13/2023    POTASSIUM 3.9 01/13/2023    CHLORIDE 106 01/13/2023    CO2 28  01/13/2023    GLC 93 01/13/2023     Lab Results   Component Value Date    GFRESTIMATED 74 01/13/2023    GFRESTBLACK >90 07/09/2021     Lab Results   Component Value Date    AST 19 01/13/2023    ALT 20 01/13/2023    ALKPHOS 82 01/13/2023    BILITOTAL 0.3 01/13/2023     Lab Results   Component Value Date    INR 1.44 (H) 02/14/2022     Lab Results   Component Value Date    BUN 16 01/13/2023    CR 0.81 01/13/2023              Assessment/Plan   Olga Bailey presents to clinic today for follow up reg her rehab needs. She has h/o left frontoparietal glioblastoma (IDH wild-type, MGMT promoter methylated). Was last seen in clinic on 1/17/23.  Current rehabilitation considerations were discussed with Olga and her  at today's visit.  She is overall doing better from a rehabilitation perspective in terms of mobility, and continues to work with home-based PT and OT.  We discussed adding SLP for ongoing cognitive deficits and strategies to help with these, and they are interested in this so the referral order was placed today.  We will plan a visit at the time Dr. Baker's visit in August 2023.  Olga and her  are in agreement with this plan.      1. Therapy/equipment/braces:  1. Continue home based PT and OT at current frequency.  2. Home SLP referral orders were placed for cognitive deficits.  3. Neuropsychology testing was reviewed at today's visit. Neuropsychology testing revealed significant cognitive deficits as outlined, and is not capable of independent decision making, will require considerable support and guidance for important decisions.  2. Follow up: August 2023      Yesenia Agudelo MD  Physical Medicine & Rehabilitation      50 minutes spent on the date of the encounter doing chart review, history and exam, documentation and further activities as noted above.

## 2023-04-25 NOTE — NURSING NOTE
Is the patient currently in the state of [place]? YES    Visit mode:VIDEO    If the visit is dropped, the patient can be reconnected by: VIDEO VISIT: Send to e-mail at: [email or web address]    Will anyone else be joining the visit? NO      How would you like to obtain your AVS? MyChart    Are changes needed to the allergy or medication list? NO    Reason for visit: Video Visit and Follow Up    Letty PATEL

## 2023-04-26 ENCOUNTER — PATIENT OUTREACH (OUTPATIENT)
Dept: ONCOLOGY | Facility: CLINIC | Age: 78
End: 2023-04-26
Payer: MEDICARE

## 2023-04-26 NOTE — TELEPHONE ENCOUNTER
[facility]: Physical Medicine and Rehabilitation                                                                                     Home Care for SLP placed by Dr.Florence Agudelo    [facility] called to say they are not able to accept as patient already has home care.    Per spouse's VM receives cares from [facility] phone  [phone]  Fax [phone]    Orders to add SLP to home therapies faxed       Erika Alfaro LPN  Oncology PM&R Care Coordination  [phone]

## 2023-05-09 ENCOUNTER — LAB REQUISITION (OUTPATIENT)
Dept: LAB | Facility: CLINIC | Age: 78
End: 2023-05-09
Payer: MEDICARE

## 2023-05-09 DIAGNOSIS — D64.9 ANEMIA, UNSPECIFIED: ICD-10-CM

## 2023-05-10 LAB
BASOPHILS # BLD AUTO: 0 10E3/UL (ref 0–0.2)
BASOPHILS NFR BLD AUTO: 1 %
EOSINOPHIL # BLD AUTO: 0.2 10E3/UL (ref 0–0.7)
EOSINOPHIL NFR BLD AUTO: 3 %
ERYTHROCYTE [DISTWIDTH] IN BLOOD BY AUTOMATED COUNT: 13.1 % (ref 10–15)
FERRITIN SERPL-MCNC: 64 NG/ML (ref 11–328)
HCT VFR BLD AUTO: 34.6 % (ref 35–47)
HGB BLD-MCNC: 10.7 G/DL (ref 11.7–15.7)
IMM GRANULOCYTES # BLD: 0 10E3/UL
IMM GRANULOCYTES NFR BLD: 0 %
IRON BINDING CAPACITY (ROCHE): 271 UG/DL (ref 240–430)
IRON SATN MFR SERPL: 21 % (ref 15–46)
IRON SERPL-MCNC: 56 UG/DL (ref 37–145)
LYMPHOCYTES # BLD AUTO: 0.7 10E3/UL (ref 0.8–5.3)
LYMPHOCYTES NFR BLD AUTO: 12 %
MCH RBC QN AUTO: 30.7 PG (ref 26.5–33)
MCHC RBC AUTO-ENTMCNC: 30.9 G/DL (ref 31.5–36.5)
MCV RBC AUTO: 99 FL (ref 78–100)
MONOCYTES # BLD AUTO: 0.6 10E3/UL (ref 0–1.3)
MONOCYTES NFR BLD AUTO: 10 %
NEUTROPHILS # BLD AUTO: 4.4 10E3/UL (ref 1.6–8.3)
NEUTROPHILS NFR BLD AUTO: 74 %
NRBC # BLD AUTO: 0 10E3/UL
NRBC BLD AUTO-RTO: 0 /100
PLATELET # BLD AUTO: 200 10E3/UL (ref 150–450)
RBC # BLD AUTO: 3.49 10E6/UL (ref 3.8–5.2)
TRANSFERRIN SERPL-MCNC: 224 MG/DL (ref 200–360)
WBC # BLD AUTO: 5.9 10E3/UL (ref 4–11)

## 2023-05-10 PROCEDURE — 82728 ASSAY OF FERRITIN: CPT | Mod: ORL | Performed by: NURSE PRACTITIONER

## 2023-05-10 PROCEDURE — 36415 COLL VENOUS BLD VENIPUNCTURE: CPT | Mod: ORL | Performed by: NURSE PRACTITIONER

## 2023-05-10 PROCEDURE — 85025 COMPLETE CBC W/AUTO DIFF WBC: CPT | Mod: ORL | Performed by: NURSE PRACTITIONER

## 2023-05-10 PROCEDURE — 84466 ASSAY OF TRANSFERRIN: CPT | Mod: ORL | Performed by: NURSE PRACTITIONER

## 2023-05-10 PROCEDURE — 83550 IRON BINDING TEST: CPT | Mod: ORL | Performed by: NURSE PRACTITIONER

## 2023-05-10 PROCEDURE — P9603 ONE-WAY ALLOW PRORATED MILES: HCPCS | Mod: ORL | Performed by: NURSE PRACTITIONER

## 2023-05-12 ENCOUNTER — LAB REQUISITION (OUTPATIENT)
Dept: LAB | Facility: CLINIC | Age: 78
End: 2023-05-12
Payer: MEDICARE

## 2023-05-12 DIAGNOSIS — D64.9 ANEMIA, UNSPECIFIED: ICD-10-CM

## 2023-05-12 LAB — HEMOCCULT STL QL: NEGATIVE

## 2023-05-12 PROCEDURE — 82272 OCCULT BLD FECES 1-3 TESTS: CPT | Mod: ORL | Performed by: NURSE PRACTITIONER

## 2023-05-17 ENCOUNTER — DOCUMENTATION ONLY (OUTPATIENT)
Dept: OTHER | Facility: CLINIC | Age: 78
End: 2023-05-17
Payer: MEDICARE

## 2023-06-06 ENCOUNTER — LAB REQUISITION (OUTPATIENT)
Dept: LAB | Facility: CLINIC | Age: 78
End: 2023-06-06
Payer: MEDICARE

## 2023-06-06 LAB
ALBUMIN UR-MCNC: 30 MG/DL
APPEARANCE UR: ABNORMAL
BACTERIA #/AREA URNS HPF: ABNORMAL /HPF
BILIRUB UR QL STRIP: NEGATIVE
COLOR UR AUTO: YELLOW
GLUCOSE UR STRIP-MCNC: NEGATIVE MG/DL
HGB UR QL STRIP: NEGATIVE
KETONES UR STRIP-MCNC: NEGATIVE MG/DL
LEUKOCYTE ESTERASE UR QL STRIP: ABNORMAL
MUCOUS THREADS #/AREA URNS LPF: PRESENT /LPF
NITRATE UR QL: POSITIVE
PH UR STRIP: 7 [PH] (ref 5–7)
RBC URINE: 6 /HPF
SP GR UR STRIP: 1.02 (ref 1–1.03)
SQUAMOUS EPITHELIAL: 2 /HPF
TRANSITIONAL EPI: 2 /HPF
UROBILINOGEN UR STRIP-MCNC: NORMAL MG/DL
WBC URINE: 83 /HPF

## 2023-06-06 PROCEDURE — 87086 URINE CULTURE/COLONY COUNT: CPT | Mod: ORL | Performed by: NURSE PRACTITIONER

## 2023-06-06 PROCEDURE — 81001 URINALYSIS AUTO W/SCOPE: CPT | Mod: ORL | Performed by: NURSE PRACTITIONER

## 2023-06-09 LAB — BACTERIA UR CULT: ABNORMAL

## 2023-06-10 NOTE — PLAN OF CARE
Alert and oriented, VSS. Pleasant. Denies pain, reports it is well-controlled on scheduled oxycodone. No SOB. BG readings were 111, 161. Appetite fair, ate 50%; unfortunately meal receipt was lost for calorie count. Encouraged fluid intake w/ nectar thick liq. Abdominal bruising noted, no other skin concerns. Purewick out for most of the shift but requested it at bedtime. LBM 5/17, loose. Pt felt like multiple BMs/diarrhea was bothersome, no parameter on mesalamine suppository; note left for provider.     Patient's most recent vital signs are:     Vital signs:  BP: 121/67  Temp: 96.5  HR: 89  RR: 18  SpO2: 95 %     Patient does not have new respiratory symptoms.  Patient does not have new sore throat.  Patient does not have a fever greater than 99.5.          10-Arthur-2023 21:26

## 2023-06-18 ENCOUNTER — HEALTH MAINTENANCE LETTER (OUTPATIENT)
Age: 78
End: 2023-06-18

## 2023-08-14 NOTE — TELEPHONE ENCOUNTER
Pt daughter called regarding nursing appointment on March 9th.  Patient is discharging that morning, and so won't be available for the staple removal.  Family has the understanding that this needs to happen on a day when Dr. Cummings is in the clinic.      Please review and advise as to how we need to adjust the schedule for this patient.  Patient's daughter believes they will be getting the pathology report and next steps at the time of the staple removal.   Referral request Dr. Lovely Godinez M.D.    6 visits    Diagnosis: R73.03 [id number]

## 2023-08-18 NOTE — PROGRESS NOTES
NEURO-ONCOLOGY VISIT  Aug 22, 2023    CHIEF COMPLAINT: Ms. Olga Bailey is a 77 year old right-handed woman with a left frontoparietal glioblastoma (IDH wild-type, MGMT promoter methylated), diagnosed following gross total resection on 2/23/2021. Initiation of upfront cancer-directed treatment was delayed due to a complicated hospitalization. Given a resolving infection and lowered functional status, radiation alone was initiated on 5/4/2021 and completed on 5/27/2021.     Olga started adjuvant therapy with 150mg/m2 dosing of temozolomide, however, cycle 1 was complicated by significant hematologic toxicity. Dosing was changed to metronomic/ daily dosing in 7/2021 and this was well tolerated. Imaging from 8/2021 and 11/2021 demonstrates positive treatment effect.    Chemotherapy was placed on a hold in 10/2021 in the setting of severe constipation, but then, resumed. She has since completed the planned 6 months of adjuvant temozolomide as of 12/2021.    Imaging since 2/2022 has been without concern for cancer recurrence, however, imaging in 7/2022 showed a punctate area of contrast enhancement of indeterminate significance. Repeat imaging in 10/2022 showed near resolution of that punctate lesion and no evidence of cancer recurrence. Imaging in 1/2023 through 4/2023 has been without concern.     However, imaging in 8/2023 demonstrated changes of unclear significance and that require closing imaging surveillance.     Olga is presenting in follow-up accompanied by Fahad ().    HISTORY OF PRESENT ILLNESS  -Olga is living at [facility], which remains an ideal environment for her.   -Using a wheelchair, Fahad feels that she is more strong. Easier transfers.    Mental processing has been more quick lately. Olga is reading more and is more interactive.    Looking to start OT and PT.   -Good energy.  -Mood is overall good.   -No headaches.  -No recurrent seizures since her last visit,  tolerating Keppra.  -On anticoagulation with no significant signs/ symptoms of bleeding.  -Recovered from a UTI in June.   -Looking forward to celebrating her birthday later this week!      MEDICATIONS   Current Outpatient Medications   Medication    amLODIPine (NORVASC) 5 MG tablet    busPIRone HCl (BUSPAR) 30 MG tablet    docosanol (ABREVA) 10 % CREA cream    FLUoxetine (PROZAC) 20 MG capsule    ketoconazole (NIZORAL) 2 % external shampoo    levETIRAcetam (KEPPRA) 1000 MG tablet    levETIRAcetam (KEPPRA) 250 MG tablet    loperamide (IMODIUM) 2 MG capsule    melatonin 1 MG TABS tablet    Methylphenidate HCl (RITALIN PO)    multivitamin, therapeutic (THERA-VIT) TABS tablet    ondansetron (ZOFRAN) 4 MG tablet    pantoprazole (PROTONIX) 40 MG EC tablet    rivaroxaban ANTICOAGULANT (XARELTO ANTICOAGULANT) 20 MG TABS tablet    senna (SENOKOT) 8.6 MG tablet    acetaminophen (TYLENOL) 500 MG tablet    albuterol (PROAIR HFA/PROVENTIL HFA/VENTOLIN HFA) 108 (90 Base) MCG/ACT inhaler    albuterol (PROVENTIL) (2.5 MG/3ML) 0.083% neb solution     No current facility-administered medications for this visit.     Current Outpatient Medications   Medication Sig Dispense Refill    amLODIPine (NORVASC) 5 MG tablet Take 1 tablet (5 mg) by mouth daily (Patient taking differently: Take 5 mg by mouth daily Hold if SBP >160 or DBP <90)      busPIRone HCl (BUSPAR) 30 MG tablet Take 30 mg by mouth 2 times daily      docosanol (ABREVA) 10 % CREA cream Apply topically 5 times daily Apply to cold sore on lip      FLUoxetine (PROZAC) 20 MG capsule Take 2 capsules (40 mg) by mouth daily 30 capsule 0    ketoconazole (NIZORAL) 2 % external shampoo Apply topically twice a week On Wed & Sun      levETIRAcetam (KEPPRA) 1000 MG tablet Take 1,000 mg by mouth 2 times daily Give with 250mg tab = 1250mg total dose twice daily      levETIRAcetam (KEPPRA) 250 MG tablet Take 250 mg by mouth 2 times daily Give with 100mg tab = 1250mg      loperamide (IMODIUM)  2 MG capsule TAKE 2 CAPSULES (4MG) BY MOUTH AFTER FIRST LOOSE STOOL, THEN;TAKE 1 CAPSULE AFTER CONSECUTIVE STOOLS, MAX 4 CAPS/24H      melatonin 1 MG TABS tablet Take 5 tablets (5 mg) by mouth nightly as needed for sleep      Methylphenidate HCl (RITALIN PO) Take 5 mg by mouth every morning      multivitamin, therapeutic (THERA-VIT) TABS tablet Take 1 tablet by mouth daily      ondansetron (ZOFRAN) 4 MG tablet Take 1 tablet (4 mg) by mouth every 8 hours as needed for nausea 30 tablet 3    pantoprazole (PROTONIX) 40 MG EC tablet Take 1 tablet (40 mg) by mouth 2 times daily (before meals) 30 tablet 0    rivaroxaban ANTICOAGULANT (XARELTO ANTICOAGULANT) 20 MG TABS tablet Take 1 tablet (20 mg) by mouth daily (with dinner) 30 tablet 3    senna (SENOKOT) 8.6 MG tablet Senokot 8.6 mg tablet      acetaminophen (TYLENOL) 500 MG tablet Take 500 mg by mouth every 4 hours as needed for mild pain       albuterol (PROAIR HFA/PROVENTIL HFA/VENTOLIN HFA) 108 (90 Base) MCG/ACT inhaler Inhale 2 puffs into the lungs every 4 hours as needed for wheezing 18 g 3    albuterol (PROVENTIL) (2.5 MG/3ML) 0.083% neb solution Take 1 vial (2.5 mg) by nebulization every 4 hours as needed for wheezing or shortness of breath / dyspnea (Patient not taking: Reported on 4/18/2023)       DRUG ALLERGIES   Allergies   Allergen Reactions    Pilocarpine Headache and Nausea and Vomiting    Chlorhexidine Itching and Rash    Aripiprazole Headache and Other (See Comments)     Insomnia, nightmares    Bacitracin Rash     Bactroban is effective; No difficulties    Erythromycin      intolerant.    Meperidine Hcl      intolerant only   Demerol    Chlorhexidine Gluconate Rash       IMMUNIZATIONS   Immunization History   Administered Date(s) Administered    COVID-19 Bivalent 18+ (Moderna) 12/13/2022    COVID-19 Monovalent 18+ (Moderna) 03/18/2021, 07/22/2021, 01/18/2022    Flu 65+ Years 09/28/2020    HepB 01/01/1990    Influenza (IIV3) PF 01/01/2001    Influenza  Vaccine 65+ (Fluzone HD) 09/28/2020    Influenza Vaccine >6 months (Alfuria,Fluzone) 09/28/2020    Pneumo Conj 13-V (2010&after) 10/29/2014    Pneumococcal 23 valent 07/22/2020    TDAP Vaccine (Boostrix) 08/22/2016    Tetanus 06/13/2004    Zoster recombinant adjuvanted (SHINGRIX) 12/24/2019, 10/12/2020    Zoster vaccine, live 12/18/2008       ONCOLOGIC HISTORY  -2/2021 PRESENTATION: Progressive aphasia.   -2/19/2021 MR brain imaging with 3.3 x 2.8 x 2.8 cm enhancing mass in left frontal-parietal region. A second contrast enhancing lesion (0.5 x 1.0 x 1.0 cm) in right occipital lobe which is dural based and largely stable in size since 9/2011; likely representing a meningioma.  -2/23/2021 SURGERY: Gross total resection by Dr. Cummings  PATHOLOGY: Glioblastoma; IDH1-R132H wild-type/ IDH 1 and 2 wildtype, MGMT promoter methylated. Not BRAF mutated.   -3/23/2021 NEURO-ONC: Recommending chemoradiotherapy.   -3/2021 ADMISSION: SOB and chest pain; CT PA negative, but bilateral DVT noted on U/S. Started on Lovenox. Acute hypoxic respiratory failure in the setting of bilateral pneumonia; presumed PJP, concern for transfusion-related acute lung injury and right hemidiaphragm paralysis. Seizure. Right retroperitoneal hematoma. Ileus. Non-severe malnutrition on TPN with concern for refeeding syndrome. 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 Shock Liver.  -5/4 - 5/27/2021 RADS: 15 fractions.   -5/25/2021 NEURO-ONC/ DEVICE: Discussed the role of Optune; to be considered. Repeat MRI in 4 weeks with plans to start adjuvant temozolomide.   -6/22/2021 NEURO-ONC/ MRB/ CHEMO: Clinically improving. Imaging largely stable. Starting adjuvant temozolomide 150mg/m2 (250mg), cycle 1 (start date 6/24).   -7/20/2021 NEURO-ONC/ CHEMO: Clinically improving. Thrombocytopenia noted with adjuvant temozolomide dosing, platelets of 26K; will transition to metronomic dosing 50mg/m2 (100mg) daily to start once platelets are > 80K.    -8/17/2021 NEURO-ONC/  MRB/ CHEMO: Clinically improving. Saw Dr. Gamboa, who recommended starting anticoagulation; on Eliquis. Imaging with positive treatment response. Continue metronomic/ daily dosing at 50mg/m2 (100mg) through 12/2021.    -9/14/2021 NEURO-ONC/ CHEMO: Clinically improving. Continue metronomic/ daily dosing at 50mg/m2 (100mg) through 12/2021.    -10/12/2021 NEURO-ONC/ CHEMO: Clinically improving. Holding temozolomide and Zofran in the setting of severe constipation. Abdominal x-ray with high stool burden; consulted Dr. Hodge for management of constipation given history of ileus.   -10/28/2021 CHEMO: Temozolomide restarted.   -11/16/2021 NEURO-ONC/ MRB/ CHEMO: Clinically improving Less constipation, continued low appetite; referral to palliative care for mental health management. Referral to Dr. Agudelo to optimize rehab. Imaging with continued positive treatment response. Continue daily temozolomide.   -12/21/2021 NEURO-ONC/ CHEMO: Clinically improving in terms of appetite, energy. Stopping daily temozolomide at the end of the month. Discussion with Dr. Gamboa regarding removal of IVC filter.   -2/14/2022 MRB with a stable postoperative changes of left parietal craniotomy and tumor resection.   -3/1/2022 NEURO-ONC: Clinically deconditioned, mood is depressed; following with Cancer PM&R and Palliative Care. With imaging stable, continue imaging surveillance.   -4/4/2022 MRB with stable postoperative change of left-sided craniotomy and tumor resection.  -7/19/2022 NEURO-ONC/ MRB: Clinically improving. Imaging with no overt evidence of cancer recurrence, new punctate area of contrast enhancement of indeterminate significance; continue imaging surveillance.   -10/4/2022 NEURO-ONC/ MRB: Clinically with improved cognition. Recommend stopping ritalin in light of no known benefit and worsening tremor. Reestablish care with Dr. Agudelo. Imaging with no overt evidence of cancer recurrence, the punctate area of contrast enhancement nearly  "resolved; continue imaging surveillance.   -1/17/2023 NEURO-ONC/ MRB: Stable neurological deficits; recommending neuropsych evaluation to objectively assess cognition and capacity. Imaging with no overt evidence of cancer recurrence.  -4/18/2023 NEURO-ONC/ MRB: Decrease in Hb. Completed neuro-psych evaluation; diagnosed as having dementia and is not capable of independent decision-making. Imaging with no new concerns.   -8/22/2023 NEURO-ONC/ MRB: No new neurological concerns. Imaging with changes of unclear significance; repeat in 1 month.       PHYSICAL EXAMINATION  /82   Pulse 71   Resp 16   Ht 1.6 m (5' 3\")   SpO2 98%   BMI 25.86 kg/m    Wt Readings from Last 2 Encounters:   04/18/23 66.2 kg (146 lb)   01/17/23 64.6 kg (142 lb 6.7 oz)      Ht Readings from Last 2 Encounters:   08/22/23 1.6 m (5' 3\")   06/03/22 1.753 m (5' 9\")     KPS: 50-60    -Good energy today. Engaged in the conversation.   -Psychiatric: Good mood, reactive affect. Pleasant.  -Neurologic:   MENTAL STATUS:     Alert.   Speech fluent, but with some speech hesitation.      Some confusion.    CRANIAL NERVES:     Pupils are equal, round.     No facial droop.   Hearing slightly decreased bilaterally.   Normal phonation.   GAIT: Sitting in a wheelchair, did not assess today.        MEDICAL RECORDS  Personally reviewed; PM&R notes    LABS  Personally reviewed all available lab results; UA concerning for infection, culture with Proteus mirabilis in June.     IMAGING  Personally reviewed MR brain imaging from today and compared to prior imaging. To my eye, there is an increase in post-contrast enhancement that is not accompanied by an increase in T2 FLAIR, DWI+, or T2 FLAIR (below). Significance is not clear and will repeat in short interval to reassess.     T1 post-contrast from today (top) and 4/2023 (bottom);      T1 post-contrast, T2 FLAIR, perfusion, DWI from today;      Formal read; \"MRI findings concerning for disease progression, " "along the resection margin as well as in the nearby frontoparietal parenchyma. Recommend short-term MRI follow-up.\"    Imaging was shown to and results were reviewed with Olga and her .       IMPRESSION  Clinic time of 40 minutes was spent reviewing data, in evaluation, and coordinating care for this high complexity visit. This was in addition to answering questions pertaining to my recommendations and devising the plan as outlined below.      Today, Olga's functional status remains stable to further improved in terms of her known neurological deficits related to her strength/ gait stability and cognition. I have also reviewed Dr. Agudelo's note from April in which the recommendation was to continue to with rehab therapies.     Imaging as detailed above with changes of unclear significance. I personally reviewed imaging with neuro-radiology and then, I called Fahad in follow-up. The plan is for short interval imaging in 1 month to closely monitor these changes.     Of note, per results of a recent neuro-psych evaluation, Olga was diagnosed with dementia and therefore, she is not capable of independent decision-making and will require considerable support and guidance for important decisions.    PROBLEM LIST  Glioblastoma  Aphasia  Apraxia  Lacks capacity    PLAN  -CANCER DIRECTED THERAPY-  -Repeat imaging in ~1 month to closely monitor changes.    -STEROIDS-  -Off dexamethasone.    -SEIZURE MANAGEMENT-  -Given history of seizures, antiepileptics are indicated.   -Continue Keppra.     -DVT-  -Following with Dr. Gamboa; currently on Eliquis.   -Requires lifelong anticoagulation given cancer diagnosis.   -IVC filter in place.     -Quality of life/ MOOD/ FATIGUE-  -History of depressed mood, PTSD, poor motivation, and poor appetite.   -Previously followed with palliative care to assess mental health and manage medications.  -Cannot tolerate CPAP for MARCELLE.    -ANEMIA-  -Deferring to primary care for " monitoring and work-up.     -REHAB NEEDS-  -PT/ OT.   -Following with Dr. Agudelo in cancer rehab.    -COGNITIVE IMPAIRMENT-  -Completed neuropsychiatric evaluation; diagnosed with dementia.   -Olga dose not have capacity for complex decision making regarding any legal or medical choices.     Return to clinic in 9/2023 months + imaging.     Stephanie Baker MD  Neuro-oncology

## 2023-08-22 ENCOUNTER — ONCOLOGY VISIT (OUTPATIENT)
Dept: ONCOLOGY | Facility: CLINIC | Age: 78
End: 2023-08-22
Attending: PSYCHIATRY & NEUROLOGY
Payer: MEDICARE

## 2023-08-22 ENCOUNTER — HOSPITAL ENCOUNTER (OUTPATIENT)
Dept: MRI IMAGING | Facility: CLINIC | Age: 78
Discharge: HOME | End: 2023-08-22
Attending: PSYCHIATRY & NEUROLOGY | Admitting: PSYCHIATRY & NEUROLOGY
Payer: MEDICARE

## 2023-08-22 VITALS
RESPIRATION RATE: 16 BRPM | OXYGEN SATURATION: 98 % | HEART RATE: 71 BPM | BODY MASS INDEX: 25.86 KG/M2 | DIASTOLIC BLOOD PRESSURE: 82 MMHG | HEIGHT: 63 IN | SYSTOLIC BLOOD PRESSURE: 138 MMHG

## 2023-08-22 DIAGNOSIS — C71.9 GLIOBLASTOMA (H): Primary | ICD-10-CM

## 2023-08-22 DIAGNOSIS — G47.00 INSOMNIA, UNSPECIFIED TYPE: ICD-10-CM

## 2023-08-22 DIAGNOSIS — C71.9 GLIOBLASTOMA (H): ICD-10-CM

## 2023-08-22 PROCEDURE — 999N000040 HC STATISTIC CONSULT NO CHARGE VASC ACCESS

## 2023-08-22 PROCEDURE — 70553 MRI BRAIN STEM W/O & W/DYE: CPT

## 2023-08-22 PROCEDURE — A9585 GADOBUTROL INJECTION: HCPCS | Performed by: PSYCHIATRY & NEUROLOGY

## 2023-08-22 PROCEDURE — G0463 HOSPITAL OUTPT CLINIC VISIT: HCPCS | Performed by: PSYCHIATRY & NEUROLOGY

## 2023-08-22 PROCEDURE — 255N000002 HC RX 255 OP 636: Performed by: PSYCHIATRY & NEUROLOGY

## 2023-08-22 PROCEDURE — 999N000128 HC STATISTIC PERIPHERAL IV START W/O US GUIDANCE

## 2023-08-22 PROCEDURE — 99215 OFFICE O/P EST HI 40 MIN: CPT | Performed by: PSYCHIATRY & NEUROLOGY

## 2023-08-22 RX ORDER — GADOBUTROL 604.72 MG/ML
15 INJECTION INTRAVENOUS ONCE
Status: COMPLETED | OUTPATIENT
Start: 2023-08-22 | End: 2023-08-22

## 2023-08-22 RX ADMIN — GADOBUTROL 15 ML: 604.72 INJECTION INTRAVENOUS at 13:41

## 2023-08-22 ASSESSMENT — PAIN SCALES - GENERAL: PAINLEVEL: NO PAIN (0)

## 2023-08-22 NOTE — PROGRESS NOTES
"Oncology Rooming Note    August 22, 2023 2:00 PM   Olga Bailey is a 77 year old female who presents for:    Chief Complaint   Patient presents with    Oncology Clinic Visit     Initial Vitals: Resp 16  Estimated body mass index is 21.56 kg/m  as calculated from the following:    Height as of 6/3/22: 1.753 m (5' 9\").    Weight as of 4/18/23: 66.2 kg (146 lb). There is no height or weight on file to calculate BSA.  Data Unavailable Comment: Data Unavailable   No LMP recorded. Patient has had a hysterectomy.  Allergies reviewed: Yes  Medications reviewed: Yes    Medications: Medication refills not needed today.  Pharmacy name entered into EPIC: Data Unavailable      Mariana Norman MA            "

## 2023-08-23 ENCOUNTER — TRANSFERRED RECORDS (OUTPATIENT)
Dept: HEALTH INFORMATION MANAGEMENT | Facility: CLINIC | Age: 78
End: 2023-08-23

## 2023-08-29 ENCOUNTER — ONCOLOGY VISIT (OUTPATIENT)
Dept: ONCOLOGY | Facility: CLINIC | Age: 78
End: 2023-08-29
Attending: PSYCHIATRY & NEUROLOGY
Payer: MEDICARE

## 2023-08-29 VITALS
DIASTOLIC BLOOD PRESSURE: 80 MMHG | OXYGEN SATURATION: 97 % | HEIGHT: 63 IN | RESPIRATION RATE: 16 BRPM | BODY MASS INDEX: 25.86 KG/M2 | SYSTOLIC BLOOD PRESSURE: 123 MMHG | HEART RATE: 75 BPM

## 2023-08-29 DIAGNOSIS — C71.9 GLIOBLASTOMA (H): Primary | ICD-10-CM

## 2023-08-29 DIAGNOSIS — R53.0 NEOPLASTIC MALIGNANT RELATED FATIGUE: ICD-10-CM

## 2023-08-29 DIAGNOSIS — R53.81 PHYSICAL DECONDITIONING: ICD-10-CM

## 2023-08-29 DIAGNOSIS — R26.81 GAIT INSTABILITY: ICD-10-CM

## 2023-08-29 DIAGNOSIS — R41.89 COGNITIVE CHANGE: ICD-10-CM

## 2023-08-29 PROCEDURE — G0463 HOSPITAL OUTPT CLINIC VISIT: HCPCS | Performed by: STUDENT IN AN ORGANIZED HEALTH CARE EDUCATION/TRAINING PROGRAM

## 2023-08-29 PROCEDURE — 99215 OFFICE O/P EST HI 40 MIN: CPT | Mod: GC | Performed by: STUDENT IN AN ORGANIZED HEALTH CARE EDUCATION/TRAINING PROGRAM

## 2023-08-29 ASSESSMENT — PAIN SCALES - GENERAL: PAINLEVEL: NO PAIN (0)

## 2023-08-29 NOTE — LETTER
8/29/2023         RE: Olga Bailey  [address]  [facility] [address]        Dear Colleague,    Thank you for referring your patient, Olga Bailey, to the [facility]. Please see a copy of my visit note below.    [facility]   PM&R clinic note        Interval history:     Olga Bailey presents to clinic today for follow up reg her rehab needs.   She has h/o left frontoparietal glioblastoma (IDH wild-type, MGMT promoter methylated).   Was last seen in clinic on 4/25/23.  Recommendations included:  Therapy/equipment/braces:  Continue home based PT and OT at current frequency.  Home SLP referral orders were placed for cognitive deficits.  Neuropsychology testing was reviewed at today's visit. Neuropsychology testing revealed significant cognitive deficits as outlined, and is not capable of independent decision making, will require considerable support and guidance for important decisions.  Follow up: August 2023    Oncology History:  2/2021 PRESENTATION: Progressive aphasia.   2/19/2021 MR brain imaging with 3.3 x 2.8 x 2.8 cm enhancing mass in left frontal-parietal region. A second contrast enhancing lesion (0.5 x 1.0 x 1.0 cm) in right occipital lobe which is dural based and largely stable in size since 9/2011; likely representing a meningioma.  2/23/2021 SURGERY: Gross total resection by Dr. Cummings  PATHOLOGY: Glioblastoma; IDH1-R132H wild-type/ IDH 1 and 2 wildtype, MGMT promoter methylated. Not BRAF mutated.   3/23/2021 NEURO-ONC: Recommending chemoradiotherapy.   3/2021 ADMISSION: SOB and chest pain; CT PA negative, but bilateral DVT noted on U/S. Started on Lovenox. Acute hypoxic respiratory failure in the setting of bilateral pneumonia; presumed PJP, concern for transfusion-related acute lung injury and right hemidiaphragm paralysis. Seizure. Right retroperitoneal hematoma. Ileus. Non-severe malnutrition on TPN with concern for  refeeding syndrome. 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 Shock Liver.  5/4 - 5/27/2021 RADS: 15 fractions.   5/25/2021 NEURO-ONC/ DEVICE: Discussed the role of Optune; to be considered. Repeat MRI in 4 weeks with plans to start adjuvant temozolomide.   6/22/2021 NEURO-ONC/ MRB/ CHEMO: Clinically improving. Imaging largely stable. Starting adjuvant temozolomide 150mg/m2 (250mg), cycle 1 (start date 6/24).   7/20/2021 NEURO-ONC/ CHEMO: Clinically improving. Thrombocytopenia noted with adjuvant temozolomide dosing, platelets of 26K; will transition to metronomic dosing 50mg/m2 (100mg) daily to start once platelets are > 80K.    8/17/2021 NEURO-ONC/ MRB/ CHEMO: Clinically improving. Saw Dr. Gamboa, who recommended starting anticoagulation; on Eliquis. Imaging with positive treatment response. Continue metronomic/ daily dosing at 50mg/m2 (100mg) through 12/2021.    9/14/2021 NEURO-ONC/ CHEMO: Clinically improving. Continue metronomic/ daily dosing at 50mg/m2 (100mg) through 12/2021.    10/12/2021 NEURO-ONC/ CHEMO: Clinically improving. Holding temozolomide and Zofran in the setting of severe constipation. Abdominal x-ray with high stool burden; consulted Dr. Hodge for management of constipation given history of ileus.   10/28/2021 CHEMO: Temozolomide restarted.   11/16/2021 NEURO-ONC/ MRB/ CHEMO: Clinically improving Less constipation, continued low appetite; referral to palliative care for mental health management. Referral to Dr. Agudelo to optimize rehab. Imaging with continued positive treatment response. Continue daily temozolomide.   3/1/2022 follow-up with Dr. Gay-patient doing overall okay.  Sleep and energy are better.  Following with palliative care and has Prozac and BuSpar, and mood is better.  Home therapies had stopped about 4 weeks ago, and interested in restarting.  She has had recent fall due to instability in her gait when she presented to the ER and imaging did not reveal any head injury or other injuries  that were sustained.  Also presented to the ER as was leaning to the right, and there was concern for stroke however stroke work-up was negative.  Follow-up visit with Dr. Baker on 10/4/22.  Clinically improved cognition.  Recommended stopping Ritalin in light of no known benefit and worsening tremor.  Reestablish care with Dr. Agudelo.  Imaging with no overt evidence of cancer recurrence, the punctate area of contrast-enhancement nearly resolved.  Continue imaging surveillance.  1/17/23- Visit with Dr. Baker. Clinically with stable neurological deficits. Imaging with no overt evidence of cancer recurrence.  3/9/23- Neuropsych testing with Dr. Lantigua. Ms. Bailey demonstrated deficits that are consistent with near global brain dysfunction, particularly for frontal, subcortical, left parietal and right hemispheric networks. Deficits are due to tumor and treatments. Also possible contributions from cerbrovascular disease. Profound deficits noted in attention, verbal fluency, executive functioning, learning, free recall, repetition, cognitive speed and bilateral psychomotor speed. Evaluation was completed in clinical context. With that said, do not think she is capable of independent decision making and will require considerable support and guidance for important decisions.  4/18/23- Return visit with Dr. Baker. Decrease in Hb. Completed neuro-psych evaluation; diagnosed as having dementia and is not capable of independent decision-making. Imaging with no new concerns.   8/22/23- Saw Dr. Baker for follow up. Using a wheelchair and easier with transfers. More ideal facility that she is living at. Recovered from UTI in June. No new neurological concerns. Imaging with changes of unclear significance; repeat in 1 month.       Symptoms,  Olga was seen for a return visit today. Her , Fahad, is present for the visit.    She is able to answer yes/no questions, however defers to her spouse for further details.  He reports  that she has not been able to start PT or OT.  He describes difficulties with scheduling and responsibilities between both the facility Candice is staying as well as with an outside agency he has contracted.  Mr. Dow decided to pay out-of-pocket for the services as he was dissatisfied with the quality of therapy prescribed by in-house PT exercises, which was to be provided by nursing aides.  He was working with an organization called [facility], which does PT and OT services.  They have also not started speech therapy, as he initially reached out to [facility].  Upon further research, it appears that [facility] does not offer speech therapy services.    Olga denies pain, difficulty swallowing, or other concerns today.  She reports feeling well.    Therapies/HEP,  Working to start PT and OT.  Underwent initial evaluation for OT on 8/23, with plans to start for session on 8/31.  PT evaluation was to be this afternoon, however was rescheduled due to medical appointments.      Functionally,   Doing some exercises with Fahad at her facility, otherwise unchanged.      Social history is unchanged.          Medications:  Current Outpatient Medications   Medication Sig Dispense Refill     acetaminophen (TYLENOL) 500 MG tablet Take 500 mg by mouth every 4 hours as needed for mild pain        albuterol (PROAIR HFA/PROVENTIL HFA/VENTOLIN HFA) 108 (90 Base) MCG/ACT inhaler Inhale 2 puffs into the lungs every 4 hours as needed for wheezing 18 g 3     albuterol (PROVENTIL) (2.5 MG/3ML) 0.083% neb solution Take 1 vial (2.5 mg) by nebulization every 4 hours as needed for wheezing or shortness of breath / dyspnea (Patient not taking: Reported on 4/18/2023)       amLODIPine (NORVASC) 5 MG tablet Take 1 tablet (5 mg) by mouth daily (Patient taking differently: Take 5 mg by mouth daily Hold if SBP >160 or DBP <90)       busPIRone HCl (BUSPAR) 30 MG tablet Take 30 mg by mouth 2 times daily       docosanol (ABREVA)  10 % CREA cream Apply topically 5 times daily Apply to cold sore on lip       FLUoxetine (PROZAC) 20 MG capsule Take 2 capsules (40 mg) by mouth daily 30 capsule 0     ketoconazole (NIZORAL) 2 % external shampoo Apply topically twice a week On Wed & Sun       levETIRAcetam (KEPPRA) 1000 MG tablet Take 1,000 mg by mouth 2 times daily Give with 250mg tab = 1250mg total dose twice daily       levETIRAcetam (KEPPRA) 250 MG tablet Take 250 mg by mouth 2 times daily Give with 100mg tab = 1250mg       loperamide (IMODIUM) 2 MG capsule TAKE 2 CAPSULES (4MG) BY MOUTH AFTER FIRST LOOSE STOOL, THEN;TAKE 1 CAPSULE AFTER CONSECUTIVE STOOLS, MAX 4 CAPS/24H       melatonin 1 MG TABS tablet Take 5 tablets (5 mg) by mouth nightly as needed for sleep       Methylphenidate HCl (RITALIN PO) Take 5 mg by mouth every morning       multivitamin, therapeutic (THERA-VIT) TABS tablet Take 1 tablet by mouth daily       ondansetron (ZOFRAN) 4 MG tablet Take 1 tablet (4 mg) by mouth every 8 hours as needed for nausea 30 tablet 3     pantoprazole (PROTONIX) 40 MG EC tablet Take 1 tablet (40 mg) by mouth 2 times daily (before meals) 30 tablet 0     rivaroxaban ANTICOAGULANT (XARELTO ANTICOAGULANT) 20 MG TABS tablet Take 1 tablet (20 mg) by mouth daily (with dinner) 30 tablet 3     senna (SENOKOT) 8.6 MG tablet Senokot 8.6 mg tablet                Physical Exam:   There were no vitals taken for this visit.   Gen: NAD, pleasant and cooperative   HEENT: Normocephalic  Cardio: Limbs well perfused  Pulm: non-labored breathing in room air  Abd: benign  Ext: WWP, trace edema in BLE, no tenderness in calves.  Neuro/MSK:   Strength in bilateral hip flexors 5/5  Strength in knee extensors 5/5 bilaterally  Strength in ankle dorsiflexors and plantar flexors 5/5 bilaterally  Sensation intact to soft touch at bilateral lower extremities    Labs/Imaging:  Lab Results   Component Value Date    WBC 5.9 05/10/2023    HGB 10.7 (L) 05/10/2023    HCT 34.6 (L)  "05/10/2023    MCV 99 05/10/2023     05/10/2023     Lab Results   Component Value Date     01/13/2023    POTASSIUM 3.9 01/13/2023    CHLORIDE 106 01/13/2023    CO2 28 01/13/2023    GLC 93 01/13/2023     Lab Results   Component Value Date    GFRESTIMATED 74 01/13/2023    GFRESTBLACK >90 07/09/2021     Lab Results   Component Value Date    AST 19 01/13/2023    ALT 20 01/13/2023    ALKPHOS 82 01/13/2023    BILITOTAL 0.3 01/13/2023     Lab Results   Component Value Date    INR 1.44 (H) 02/14/2022     Lab Results   Component Value Date    BUN 16 01/13/2023    CR 0.81 01/13/2023              Assessment/Plan   Olga Bailey presents to clinic today for follow up reg her rehab needs.   She has h/o left frontoparietal glioblastoma (IDH wild-type, MGMT promoter methylated).   Was last seen in clinic on 4/25/23.    Today, Olga presents with her spouse, Fahad.  He answered the [employer] questions for her, including history of questions and coordinating questions for home PT and OT.  There have been a lot of difficulties organizing home PT and OT at her facility.  He has worked with an outside organization, [facility], in order to start providing the services.  Unfortunately, they do not have in-house speech therapy.  Discussed the importance of getting into all 3 therapy disciplines as soon as possible.  Offered to discuss with social work regarding organizations that may also have speech therapy in house, and both are in agreement with the plan.      Work-up: None today  Therapy/equipment/braces: Recommend x2 weekly with Physical Therapy and Occupational Therapy. Speech therapy referral and evaluation.  Currently plan to continue with the organization \"[facility]\".  However, we will also work to see if there are other agency options that also have speech therapy under their umbrella to add to therapy cares.  Follow up: 4 months with Dr. Agudelo    Pt was seen and discussed with Dr. Agudelo, PM&R staff " "physician    Shannan Hill,   PGY-3  Physical Medicine and Rehabilitation    Physician Attestation  I, Yesenia Agudelo MD, saw this patient and agree with the findings and plan of care as documented in the note.      Items personally reviewed/procedural attestation: vitals, labs, and imaging and agree with the interpretation documented in the note.      Yesenia Agudelo MD  Physical Medicine & Rehabilitation      50 minutes spent on the date of the encounter doing chart review, history and exam, documentation and further activities as noted above.             Oncology Rooming Note    August 29, 2023 11:14 AM   Olga Bailey is a 78 year old female who presents for:    Chief Complaint   Patient presents with     Oncology Clinic Visit     Initial Vitals: Resp 16   Ht 1.6 m (5' 3\")   BMI 25.86 kg/m   Estimated body mass index is 25.86 kg/m  as calculated from the following:    Height as of this encounter: 1.6 m (5' 3\").    Weight as of 4/18/23: 66.2 kg (146 lb). Body surface area is 1.72 meters squared.  No Pain (0) Comment: Data Unavailable   No LMP recorded. Patient has had a hysterectomy.  Allergies reviewed: Yes  Medications reviewed: Yes    Medications: Medication refills not needed today.  Pharmacy name entered into [facility]: [facility] - [place], [place] - [address] [place] [address]        Mariana Norman MA              Again, thank you for allowing me to participate in the care of your patient.        Sincerely,        Yesenia Agudelo MD  "

## 2023-08-29 NOTE — PROGRESS NOTES
"Oncology Rooming Note    August 29, 2023 11:14 AM   Olga Bailey is a 78 year old female who presents for:    Chief Complaint   Patient presents with    Oncology Clinic Visit     Initial Vitals: Resp 16   Ht 1.6 m (5' 3\")   BMI 25.86 kg/m   Estimated body mass index is 25.86 kg/m  as calculated from the following:    Height as of this encounter: 1.6 m (5' 3\").    Weight as of 4/18/23: 66.2 kg (146 lb). Body surface area is 1.72 meters squared.  No Pain (0) Comment: Data Unavailable   No LMP recorded. Patient has had a hysterectomy.  Allergies reviewed: Yes  Medications reviewed: Yes    Medications: Medication refills not needed today.  Pharmacy name entered into [facility]: [facility]  - [place], [place] - [address] [facility] [address] [facility] [address]            "

## 2023-08-29 NOTE — PROGRESS NOTES
[facility]   PM&R clinic note        Interval history:     Olga Bailey presents to clinic today for follow up reg her rehab needs.   She has h/o left frontoparietal glioblastoma (IDH wild-type, MGMT promoter methylated).   Was last seen in clinic on 4/25/23.  Recommendations included:  Therapy/equipment/braces:  Continue home based PT and OT at current frequency.  Home SLP referral orders were placed for cognitive deficits.  Neuropsychology testing was reviewed at today's visit. Neuropsychology testing revealed significant cognitive deficits as outlined, and is not capable of independent decision making, will require considerable support and guidance for important decisions.  Follow up: August 2023    Oncology History:  2/2021 PRESENTATION: Progressive aphasia.   2/19/2021 MR brain imaging with 3.3 x 2.8 x 2.8 cm enhancing mass in left frontal-parietal region. A second contrast enhancing lesion (0.5 x 1.0 x 1.0 cm) in right occipital lobe which is dural based and largely stable in size since 9/2011; likely representing a meningioma.  2/23/2021 SURGERY: Gross total resection by Dr. Cummings  PATHOLOGY: Glioblastoma; IDH1-R132H wild-type/ IDH 1 and 2 wildtype, MGMT promoter methylated. Not BRAF mutated.   3/23/2021 NEURO-ONC: Recommending chemoradiotherapy.   3/2021 ADMISSION: SOB and chest pain; CT PA negative, but bilateral DVT noted on U/S. Started on Lovenox. Acute hypoxic respiratory failure in the setting of bilateral pneumonia; presumed PJP, concern for transfusion-related acute lung injury and right hemidiaphragm paralysis. Seizure. Right retroperitoneal hematoma. Ileus. Non-severe malnutrition on TPN with concern for refeeding syndrome. 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 Shock Liver.  5/4 - 5/27/2021 RADS: 15 fractions.   5/25/2021 NEURO-ONC/ DEVICE: Discussed the role of Optune; to be considered. Repeat MRI in 4 weeks with plans to start adjuvant temozolomide.    6/22/2021 NEURO-ONC/ MRB/ CHEMO: Clinically improving. Imaging largely stable. Starting adjuvant temozolomide 150mg/m2 (250mg), cycle 1 (start date 6/24).   7/20/2021 NEURO-ONC/ CHEMO: Clinically improving. Thrombocytopenia noted with adjuvant temozolomide dosing, platelets of 26K; will transition to metronomic dosing 50mg/m2 (100mg) daily to start once platelets are > 80K.    8/17/2021 NEURO-ONC/ MRB/ CHEMO: Clinically improving. Saw Dr. Gamboa, who recommended starting anticoagulation; on Eliquis. Imaging with positive treatment response. Continue metronomic/ daily dosing at 50mg/m2 (100mg) through 12/2021.    9/14/2021 NEURO-ONC/ CHEMO: Clinically improving. Continue metronomic/ daily dosing at 50mg/m2 (100mg) through 12/2021.    10/12/2021 NEURO-ONC/ CHEMO: Clinically improving. Holding temozolomide and Zofran in the setting of severe constipation. Abdominal x-ray with high stool burden; consulted Dr. Hodge for management of constipation given history of ileus.   10/28/2021 CHEMO: Temozolomide restarted.   11/16/2021 NEURO-ONC/ MRB/ CHEMO: Clinically improving Less constipation, continued low appetite; referral to palliative care for mental health management. Referral to Dr. Agudelo to optimize rehab. Imaging with continued positive treatment response. Continue daily temozolomide.   3/1/2022 follow-up with Dr. Gay-patient doing overall okay.  Sleep and energy are better.  Following with palliative care and has Prozac and BuSpar, and mood is better.  Home therapies had stopped about 4 weeks ago, and interested in restarting.  She has had recent fall due to instability in her gait when she presented to the ER and imaging did not reveal any head injury or other injuries that were sustained.  Also presented to the ER as was leaning to the right, and there was concern for stroke however stroke work-up was negative.  Follow-up visit with Dr. Baker on 10/4/22.  Clinically improved cognition.  Recommended stopping  Ritalin in light of no known benefit and worsening tremor.  Reestablish care with Dr. Agudelo.  Imaging with no overt evidence of cancer recurrence, the punctate area of contrast-enhancement nearly resolved.  Continue imaging surveillance.  1/17/23- Visit with Dr. Baker. Clinically with stable neurological deficits. Imaging with no overt evidence of cancer recurrence.  3/9/23- Neuropsych testing with Dr. Lantigua. Ms. Bailey demonstrated deficits that are consistent with near global brain dysfunction, particularly for frontal, subcortical, left parietal and right hemispheric networks. Deficits are due to tumor and treatments. Also possible contributions from cerbrovascular disease. Profound deficits noted in attention, verbal fluency, executive functioning, learning, free recall, repetition, cognitive speed and bilateral psychomotor speed. Evaluation was completed in clinical context. With that said, do not think she is capable of independent decision making and will require considerable support and guidance for important decisions.  4/18/23- Return visit with Dr. Baker. Decrease in Hb. Completed neuro-psych evaluation; diagnosed as having dementia and is not capable of independent decision-making. Imaging with no new concerns.   8/22/23- Saw Dr. Baker for follow up. Using a wheelchair and easier with transfers. More ideal facility that she is living at. Recovered from UTI in June. No new neurological concerns. Imaging with changes of unclear significance; repeat in 1 month.       Symptoms,  Olga was seen for a return visit today. Her , Fahad, is present for the visit.    She is able to answer yes/no questions, however defers to her spouse for further details.  He reports that she has not been able to start PT or OT.  He describes difficulties with scheduling and responsibilities between both the facility [facility] is staying as well as with an outside agency he has contracted.  Mr. Dow decided to pay  out-of-pocket for the services as he was dissatisfied with the quality of therapy prescribed by in-house PT exercises, which was to be provided by nursing aides.  He was working with an organization called [facility], which does PT and OT services.  They have also not started speech therapy, as he initially reached out to [facility].  Upon further research, it appears that [facility] does not offer speech therapy services.    Olga denies pain, difficulty swallowing, or other concerns today.  She reports feeling well.    Therapies/HEP,  Working to start PT and OT.  Underwent initial evaluation for OT on 8/23, with plans to start for session on 8/31.  PT evaluation was to be this afternoon, however was rescheduled due to medical appointments.      Functionally,   Doing some exercises with Fahad at her facility, otherwise unchanged.      Social history is unchanged.          Medications:  Current Outpatient Medications   Medication Sig Dispense Refill    acetaminophen (TYLENOL) 500 MG tablet Take 500 mg by mouth every 4 hours as needed for mild pain       albuterol (PROAIR HFA/PROVENTIL HFA/VENTOLIN HFA) 108 (90 Base) MCG/ACT inhaler Inhale 2 puffs into the lungs every 4 hours as needed for wheezing 18 g 3    albuterol (PROVENTIL) (2.5 MG/3ML) 0.083% neb solution Take 1 vial (2.5 mg) by nebulization every 4 hours as needed for wheezing or shortness of breath / dyspnea (Patient not taking: Reported on 4/18/2023)      amLODIPine (NORVASC) 5 MG tablet Take 1 tablet (5 mg) by mouth daily (Patient taking differently: Take 5 mg by mouth daily Hold if SBP >160 or DBP <90)      busPIRone HCl (BUSPAR) 30 MG tablet Take 30 mg by mouth 2 times daily      docosanol (ABREVA) 10 % CREA cream Apply topically 5 times daily Apply to cold sore on lip      FLUoxetine (PROZAC) 20 MG capsule Take 2 capsules (40 mg) by mouth daily 30 capsule 0    ketoconazole (NIZORAL) 2 % external shampoo Apply topically twice a week  On Wed & Sun      levETIRAcetam (KEPPRA) 1000 MG tablet Take 1,000 mg by mouth 2 times daily Give with 250mg tab = 1250mg total dose twice daily      levETIRAcetam (KEPPRA) 250 MG tablet Take 250 mg by mouth 2 times daily Give with 100mg tab = 1250mg      loperamide (IMODIUM) 2 MG capsule TAKE 2 CAPSULES (4MG) BY MOUTH AFTER FIRST LOOSE STOOL, THEN;TAKE 1 CAPSULE AFTER CONSECUTIVE STOOLS, MAX 4 CAPS/24H      melatonin 1 MG TABS tablet Take 5 tablets (5 mg) by mouth nightly as needed for sleep      Methylphenidate HCl (RITALIN PO) Take 5 mg by mouth every morning      multivitamin, therapeutic (THERA-VIT) TABS tablet Take 1 tablet by mouth daily      ondansetron (ZOFRAN) 4 MG tablet Take 1 tablet (4 mg) by mouth every 8 hours as needed for nausea 30 tablet 3    pantoprazole (PROTONIX) 40 MG EC tablet Take 1 tablet (40 mg) by mouth 2 times daily (before meals) 30 tablet 0    rivaroxaban ANTICOAGULANT (XARELTO ANTICOAGULANT) 20 MG TABS tablet Take 1 tablet (20 mg) by mouth daily (with dinner) 30 tablet 3    senna (SENOKOT) 8.6 MG tablet Senokot 8.6 mg tablet                Physical Exam:   There were no vitals taken for this visit.   Gen: NAD, pleasant and cooperative   HEENT: Normocephalic  Cardio: Limbs well perfused  Pulm: non-labored breathing in room air  Abd: benign  Ext: WWP, trace edema in BLE, no tenderness in calves.  Neuro/MSK:   Strength in bilateral hip flexors 5/5  Strength in knee extensors 5/5 bilaterally  Strength in ankle dorsiflexors and plantar flexors 5/5 bilaterally  Sensation intact to soft touch at bilateral lower extremities    Labs/Imaging:  Lab Results   Component Value Date    WBC 5.9 05/10/2023    HGB 10.7 (L) 05/10/2023    HCT 34.6 (L) 05/10/2023    MCV 99 05/10/2023     05/10/2023     Lab Results   Component Value Date     01/13/2023    POTASSIUM 3.9 01/13/2023    CHLORIDE 106 01/13/2023    CO2 28 01/13/2023    GLC 93 01/13/2023     Lab Results   Component Value Date     "GFRESTIMATED 74 01/13/2023    GFRESTBLACK >90 07/09/2021     Lab Results   Component Value Date    AST 19 01/13/2023    ALT 20 01/13/2023    ALKPHOS 82 01/13/2023    BILITOTAL 0.3 01/13/2023     Lab Results   Component Value Date    INR 1.44 (H) 02/14/2022     Lab Results   Component Value Date    BUN 16 01/13/2023    CR 0.81 01/13/2023              Assessment/Plan   Olga Bailey presents to clinic today for follow up reg her rehab needs.   She has h/o left frontoparietal glioblastoma (IDH wild-type, MGMT promoter methylated).   Was last seen in clinic on 4/25/23.    Today, Olga presents with her spouse, Fahad.  He answered the [employer] questions for her, including history of questions and coordinating questions for home PT and OT.  There have been a lot of difficulties organizing home PT and OT at her facility.  He has worked with an outside organization, [facility], in order to start providing the services.  Unfortunately, they do not have in-house speech therapy.  Discussed the importance of getting into all 3 therapy disciplines as soon as possible.  Offered to discuss with social work regarding organizations that may also have speech therapy in house, and both are in agreement with the plan.      Work-up: None today  Therapy/equipment/braces: Recommend x2 weekly with Physical Therapy and Occupational Therapy. Speech therapy referral and evaluation.  Currently plan to continue with the organization \"[facility]\".  However, we will also work to see if there are other agency options that also have speech therapy under their umbrella to add to therapy cares.  Follow up: 4 months with Dr. Agudelo    Pt was seen and discussed with Dr. Agudelo, PM&R staff physician    Shannan Hill, DO  PGY-3  Physical Medicine and Rehabilitation    Physician Attestation   I, Yesenia Agudelo MD, saw this patient and agree with the findings and plan of care as documented in the note.      Items personally " reviewed/procedural attestation: vitals, labs, and imaging and agree with the interpretation documented in the note.      Yesenia Agudelo MD  Physical Medicine & Rehabilitation      50 minutes spent on the date of the encounter doing chart review, history and exam, documentation and further activities as noted above.

## 2023-08-30 ENCOUNTER — TRANSFERRED RECORDS (OUTPATIENT)
Dept: HEALTH INFORMATION MANAGEMENT | Facility: CLINIC | Age: 78
End: 2023-08-30

## 2023-08-31 ENCOUNTER — TRANSFERRED RECORDS (OUTPATIENT)
Dept: HEALTH INFORMATION MANAGEMENT | Facility: CLINIC | Age: 78
End: 2023-08-31

## 2023-09-05 ENCOUNTER — TRANSFERRED RECORDS (OUTPATIENT)
Dept: MEDSURG UNIT | Facility: CLINIC | Age: 78
End: 2023-09-05

## 2023-09-06 ENCOUNTER — TRANSFERRED RECORDS (OUTPATIENT)
Dept: MEDSURG UNIT | Facility: CLINIC | Age: 78
End: 2023-09-06

## 2023-09-07 ENCOUNTER — TRANSFERRED RECORDS (OUTPATIENT)
Dept: MEDSURG UNIT | Facility: CLINIC | Age: 78
End: 2023-09-07

## 2023-09-14 ENCOUNTER — TRANSFERRED RECORDS (OUTPATIENT)
Dept: HEALTH INFORMATION MANAGEMENT | Facility: CLINIC | Age: 78
End: 2023-09-14
Payer: MEDICARE

## 2023-09-15 ENCOUNTER — TRANSFERRED RECORDS (OUTPATIENT)
Dept: MEDSURG UNIT | Facility: CLINIC | Age: 78
End: 2023-09-15
Payer: MEDICARE

## 2023-09-19 ENCOUNTER — TRANSFERRED RECORDS (OUTPATIENT)
Dept: HEALTH INFORMATION MANAGEMENT | Facility: CLINIC | Age: 78
End: 2023-09-19
Payer: MEDICARE

## 2023-09-20 ENCOUNTER — TRANSFERRED RECORDS (OUTPATIENT)
Dept: HEALTH INFORMATION MANAGEMENT | Facility: CLINIC | Age: 78
End: 2023-09-20
Payer: MEDICARE

## 2023-09-21 ENCOUNTER — TRANSFERRED RECORDS (OUTPATIENT)
Dept: HEALTH INFORMATION MANAGEMENT | Facility: CLINIC | Age: 78
End: 2023-09-21
Payer: MEDICARE

## 2023-09-21 NOTE — PROGRESS NOTES
NEURO-ONCOLOGY VISIT  Sep 26, 2023    CHIEF COMPLAINT: Ms. Olga Bailey is a 78 year old right-handed woman with a left frontoparietal glioblastoma (IDH wild-type, MGMT promoter methylated), diagnosed following gross total resection on 2/23/2021. Initiation of upfront cancer-directed treatment was delayed due to a complicated hospitalization. Given a resolving infection and lowered functional status, radiation alone was initiated on 5/4/2021 and completed on 5/27/2021.     Olga started adjuvant therapy with 150mg/m2 dosing of temozolomide, however, cycle 1 was complicated by significant hematologic toxicity. Dosing was changed to metronomic/ daily dosing in 7/2021 and this was well tolerated. Imaging from 8/2021 and 11/2021 demonstrates positive treatment effect.    Chemotherapy was placed on a hold in 10/2021 in the setting of severe constipation, but then, resumed. She has since completed the planned 6 months of adjuvant temozolomide as of 12/2021.    Imaging since 2/2022 has been without concern for cancer recurrence, however, imaging in 7/2022 showed a punctate area of contrast enhancement of indeterminate significance. Repeat imaging in 10/2022 showed near resolution of that punctate lesion and no evidence of cancer recurrence. Imaging in 1/2023 through 4/2023 has been without concern.     However, imaging in 8/2023 demonstrated changes of unclear significance that on repeat imaging in 9/2023 showed clear evidence of cancer recurrence.     Olga is presenting in follow-up accompanied by Fahad ().    HISTORY OF PRESENT ILLNESS  -Olga is living at [facility], which remains an ideal environment for her.   -Using a wheelchair, Fahad feels that she remains strong. Able to assist with transfers.    Now working with PT. She is taking a steps with stand-by assist.   -Good energy. Not needing to nap.   -Mood is overall good. Very engaged.   -No headaches.  -No recurrent seizures since her  last visit, tolerating Keppra.  -On anticoagulation with no significant signs/ symptoms of bleeding.      MEDICATIONS   Current Outpatient Medications   Medication     acetaminophen (TYLENOL) 500 MG tablet     albuterol (PROAIR HFA/PROVENTIL HFA/VENTOLIN HFA) 108 (90 Base) MCG/ACT inhaler     albuterol (PROVENTIL) (2.5 MG/3ML) 0.083% neb solution     amLODIPine (NORVASC) 5 MG tablet     busPIRone HCl (BUSPAR) 30 MG tablet     FLUoxetine (PROZAC) 20 MG capsule     ketoconazole (NIZORAL) 2 % external shampoo     levETIRAcetam (KEPPRA) 1000 MG tablet     levETIRAcetam (KEPPRA) 250 MG tablet     loperamide (IMODIUM) 2 MG capsule     melatonin 1 MG TABS tablet     multivitamin, therapeutic (THERA-VIT) TABS tablet     ondansetron (ZOFRAN) 4 MG tablet     pantoprazole (PROTONIX) 40 MG EC tablet     rivaroxaban ANTICOAGULANT (XARELTO ANTICOAGULANT) 20 MG TABS tablet     senna (SENOKOT) 8.6 MG tablet     docosanol (ABREVA) 10 % CREA cream     No current facility-administered medications for this visit.     Current Outpatient Medications   Medication Sig Dispense Refill     acetaminophen (TYLENOL) 500 MG tablet Take 500 mg by mouth every 4 hours as needed for mild pain        albuterol (PROAIR HFA/PROVENTIL HFA/VENTOLIN HFA) 108 (90 Base) MCG/ACT inhaler Inhale 2 puffs into the lungs every 4 hours as needed for wheezing 18 g 3     albuterol (PROVENTIL) (2.5 MG/3ML) 0.083% neb solution Take 1 vial (2.5 mg) by nebulization every 4 hours as needed for wheezing or shortness of breath / dyspnea       amLODIPine (NORVASC) 5 MG tablet Take 1 tablet (5 mg) by mouth daily (Patient taking differently: Take 5 mg by mouth daily Hold if SBP >160 or DBP <90)       busPIRone HCl (BUSPAR) 30 MG tablet Take 30 mg by mouth 2 times daily       FLUoxetine (PROZAC) 20 MG capsule Take 2 capsules (40 mg) by mouth daily 30 capsule 0     ketoconazole (NIZORAL) 2 % external shampoo Apply topically twice a week On Wed & Sun       levETIRAcetam (KEPPRA)  1000 MG tablet Take 1,000 mg by mouth 2 times daily Give with 250mg tab = 1250mg total dose twice daily       levETIRAcetam (KEPPRA) 250 MG tablet Take 250 mg by mouth 2 times daily Give with 100mg tab = 1250mg       loperamide (IMODIUM) 2 MG capsule TAKE 2 CAPSULES (4MG) BY MOUTH AFTER FIRST LOOSE STOOL, THEN;TAKE 1 CAPSULE AFTER CONSECUTIVE STOOLS, MAX 4 CAPS/24H       melatonin 1 MG TABS tablet Take 5 tablets (5 mg) by mouth nightly as needed for sleep       multivitamin, therapeutic (THERA-VIT) TABS tablet Take 1 tablet by mouth daily       ondansetron (ZOFRAN) 4 MG tablet Take 1 tablet (4 mg) by mouth every 8 hours as needed for nausea 30 tablet 3     pantoprazole (PROTONIX) 40 MG EC tablet Take 1 tablet (40 mg) by mouth 2 times daily (before meals) 30 tablet 0     rivaroxaban ANTICOAGULANT (XARELTO ANTICOAGULANT) 20 MG TABS tablet Take 1 tablet (20 mg) by mouth daily (with dinner) 30 tablet 3     senna (SENOKOT) 8.6 MG tablet Senokot 8.6 mg tablet       docosanol (ABREVA) 10 % CREA cream Apply topically 5 times daily Apply to cold sore on lip (Patient not taking: Reported on 9/26/2023)       DRUG ALLERGIES   Allergies   Allergen Reactions     Pilocarpine Headache and Nausea and Vomiting     Chlorhexidine Itching and Rash     Aripiprazole Headache and Other (See Comments)     Insomnia, nightmares     Bacitracin Rash     Bactroban is effective; No difficulties     Erythromycin      intolerant.     Meperidine Hcl      intolerant only   Demerol     Chlorhexidine Gluconate Rash       IMMUNIZATIONS   Immunization History   Administered Date(s) Administered     COVID-19 Bivalent 18+ (Moderna) 12/13/2022     COVID-19 Monovalent 18+ (Moderna) 03/18/2021, 07/22/2021, 01/18/2022     Flu 65+ Years 09/28/2020     HepB 01/01/1990     Influenza (IIV3) PF 01/01/2001     Influenza Vaccine 65+ (Fluzone HD) 09/28/2020     Influenza Vaccine >6 months (Alfuria,Fluzone) 09/28/2020     Pneumo Conj 13-V (2010&after) 10/29/2014      Pneumococcal 23 valent 07/22/2020     TDAP Vaccine (Boostrix) 08/22/2016     Tetanus 06/13/2004     Zoster recombinant adjuvanted (SHINGRIX) 12/24/2019, 10/12/2020     Zoster vaccine, live 12/18/2008       ONCOLOGIC HISTORY  -2/2021 PRESENTATION: Progressive aphasia.   -2/19/2021 MR brain imaging with 3.3 x 2.8 x 2.8 cm enhancing mass in left frontal-parietal region. A second contrast enhancing lesion (0.5 x 1.0 x 1.0 cm) in right occipital lobe which is dural based and largely stable in size since 9/2011; likely representing a meningioma.  -2/23/2021 SURGERY: Gross total resection by Dr. Cummings  PATHOLOGY: Glioblastoma; IDH1-R132H wild-type/ IDH 1 and 2 wildtype, MGMT promoter methylated. Not BRAF mutated.   -3/23/2021 NEURO-ONC: Recommending chemoradiotherapy.   -3/2021 ADMISSION: SOB and chest pain; CT PA negative, but bilateral DVT noted on U/S. Started on Lovenox. Acute hypoxic respiratory failure in the setting of bilateral pneumonia; presumed PJP, concern for transfusion-related acute lung injury and right hemidiaphragm paralysis. Seizure. Right retroperitoneal hematoma. Ileus. Non-severe malnutrition on TPN with concern for refeeding syndrome. 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 Shock Liver.  -5/4 - 5/27/2021 RADS: 15 fractions.   -5/25/2021 NEURO-ONC/ DEVICE: Discussed the role of Optune; to be considered. Repeat MRI in 4 weeks with plans to start adjuvant temozolomide.   -6/22/2021 NEURO-ONC/ MRB/ CHEMO: Clinically improving. Imaging largely stable. Starting adjuvant temozolomide 150mg/m2 (250mg), cycle 1 (start date 6/24).   -7/20/2021 NEURO-ONC/ CHEMO: Clinically improving. Thrombocytopenia noted with adjuvant temozolomide dosing, platelets of 26K; will transition to metronomic dosing 50mg/m2 (100mg) daily to start once platelets are > 80K.    -8/17/2021 NEURO-ONC/ MRB/ CHEMO: Clinically improving. Saw Dr. Gamboa, who recommended starting anticoagulation; on Eliquis. Imaging with positive treatment  response. Continue metronomic/ daily dosing at 50mg/m2 (100mg) through 12/2021.    -9/14/2021 NEURO-ONC/ CHEMO: Clinically improving. Continue metronomic/ daily dosing at 50mg/m2 (100mg) through 12/2021.    -10/12/2021 NEURO-ONC/ CHEMO: Clinically improving. Holding temozolomide and Zofran in the setting of severe constipation. Abdominal x-ray with high stool burden; consulted Dr. Hodge for management of constipation given history of ileus.   -10/28/2021 CHEMO: Temozolomide restarted.   -11/16/2021 NEURO-ONC/ MRB/ CHEMO: Clinically improving Less constipation, continued low appetite; referral to palliative care for mental health management. Referral to Dr. Agudelo to optimize rehab. Imaging with continued positive treatment response. Continue daily temozolomide.   -12/21/2021 NEURO-ONC/ CHEMO: Clinically improving in terms of appetite, energy. Stopping daily temozolomide at the end of the month. Discussion with Dr. Gamboa regarding removal of IVC filter.   -2/14/2022 MRB with a stable postoperative changes of left parietal craniotomy and tumor resection.   -3/1/2022 NEURO-ONC: Clinically deconditioned, mood is depressed; following with Cancer PM&R and Palliative Care. With imaging stable, continue imaging surveillance.   -4/4/2022 MRB with stable postoperative change of left-sided craniotomy and tumor resection.  -7/19/2022 NEURO-ONC/ MRB: Clinically improving. Imaging with no overt evidence of cancer recurrence, new punctate area of contrast enhancement of indeterminate significance; continue imaging surveillance.   -10/4/2022 NEURO-ONC/ MRB: Clinically with improved cognition. Recommend stopping ritalin in light of no known benefit and worsening tremor. Reestablish care with Dr. Agudelo. Imaging with no overt evidence of cancer recurrence, the punctate area of contrast enhancement nearly resolved; continue imaging surveillance.   -1/17/2023 NEURO-ONC/ MRB: Stable neurological deficits; recommending neuropsych evaluation  "to objectively assess cognition and capacity. Imaging with no overt evidence of cancer recurrence.  -4/18/2023 NEURO-ONC/ MRB: Decrease in Hb. Completed neuro-psych evaluation; diagnosed as having dementia and is not capable of independent decision-making. Imaging with no new concerns.   -8/22/2023 NEURO-ONC/ MRB: No new neurological concerns. Imaging with changes of unclear significance; repeat in 1 month.   -9/26/2023 NEURO-ONC/ MRB: No new neurological concerns. Imaging with evidence of cancer recurrence. Referral to radiation oncology for a discussion about repeat radiation. Remaining focused on maintaining a good quality of life.        PHYSICAL EXAMINATION  /79 (BP Location: Right arm, Patient Position: Sitting, Cuff Size: Adult Large)   Pulse 84   Temp 98  F (36.7  C) (Oral)   Resp 18   SpO2 95%   Wt Readings from Last 2 Encounters:   04/18/23 66.2 kg (146 lb)   01/17/23 64.6 kg (142 lb 6.7 oz)      Ht Readings from Last 2 Encounters:   08/29/23 1.6 m (5' 3\")   08/22/23 1.6 m (5' 3\")     KPS: 50    -Good energy today.  -Psychiatric: Good mood. Pleasant.  -Neurologic:   MENTAL STATUS:     Alert.   Speech with much hesitation.      Some confusion, reduced comprehension.    CRANIAL NERVES:     Pupils are equal, round.     No facial droop.   Hearing slightly decreased bilaterally.   Normal phonation.   MOTOR: Resting and intention tremor of the left hand.   GAIT: Sitting in a wheelchair.        MEDICAL RECORDS  Personally reviewed; PM&R notes.    LABS  Personally reviewed all available lab results; No new labs since June.     IMAGING  Personally reviewed MR brain imaging from today and compared to prior imaging. To my eye, there has been an interval increase in contrast enhancement about the resection cavity with an associated increase in perfusion (below). There is also an increase in contrast enhancement in the posterior-inferior lateral left frontal region that is anterior to the cavity. " "        Formal read; \"Increased size/extent of nodular enhancement about the left frontoparietal resection cavity, concerning for disease progression.\"    Imaging was shown to and results were reviewed with Petey.       IMPRESSION  Clinic time of 50 minutes was spent reviewing data, in evaluation, and coordinating care for this high complexity visit. This was in addition to answering questions pertaining to my recommendations and devising the plan as outlined below.      Today, Olga's functional status remains stable in terms of her known neurological deficits related to her strength/ gait stability and cognition. I have also reviewed Dr. Agudelo's note from August in which the recommendation was to continue to with rehab therapies.     Imaging as detailed above with changes that are now concerning for cancer recurrence. Therefore, today I discussed with Olga and Fahad potential future options for management. First, there should be consideration for repeat surgery. However, given her functional status and known diagnosis of dementia, recovery from surgery would have significant challenges. In addition, given the multifocality of her recurrence, surgery would likely be of limited utility. I have reached out to a neurosurgical colleague to confirm my impression and he is currently reviewing her case and imaging. Next, given that Olga completed her initial course of radiation > 2 years ago, a repeat course could be considered. I will place a referral to radiation oncology for a more in depth discussion on the risks/ benefits of repeat radiation. Should the treatment plan be to initiate radiation, given that Olga completed temozolomide ~1.5 years ago with a positive sustained treatment response and that the cancer is MGMT promoter methylated, repeat use of adjuvant temozolomide following radiation is also an appropriate option.     Lastly, I discussed with Petey today that " aggressive management of cancer has the potential of reducing one's quality of life, particularly if a patient in not in a position to tolerate the treatment well. Both Olga and Fahad are acutely aware that she has a type of brain cancer for which there is currently no cure and therefore, there needs to be care taken in balancing side effects of treatment with a significant and inappropriate reduction in quality of life. At this time, I am in support of ongoing cancer-directed therapy as long as this therapy and all associated toxicity is in line with Olga's goals of care.     Of note, per results of a recent neuro-psych evaluation, Olga carries the diagnosis of dementia and therefore, she is not capable of independent decision-making due to a lack in capacity and will require considerable support and guidance for important decisions.    PROBLEM LIST  Glioblastoma  Aphasia  Apraxia  Lacks capacity, dementia  Gait instability  Fatigue    PLAN  -CANCER DIRECTED THERAPY-  -Referral to radiation oncology.     -STEROIDS-  -Off dexamethasone.    -SEIZURE MANAGEMENT-  -Given history of seizures, antiepileptics are indicated.   -Continue Keppra.   -No abortive therapy needed at this time.     -DVT-  -Following with Dr. Gamboa; currently on Eliquis.   -Requires lifelong anticoagulation given cancer diagnosis.   -IVC filter in place.     -Quality of life/ MOOD/ FATIGUE-  -History of depressed mood, PTSD, poor motivation, and poor appetite.   -Previously followed with palliative care to assess mental health and manage medications.  -Cannot tolerate CPAP for MARCELLE.    -REHAB NEEDS-  -PT/ OT.   -Following with Dr. Agudelo in cancer rehab.    -COGNITIVE IMPAIRMENT-  -Completed neuropsychiatric evaluation; diagnosed with dementia.   -Olga dose not have capacity for complex decision making regarding any legal or medical choices.     Return to clinic pending discussion with radiation oncology.     Stephanie Baker,  MD  Neuro-oncology

## 2023-09-25 ENCOUNTER — TRANSFERRED RECORDS (OUTPATIENT)
Dept: HEALTH INFORMATION MANAGEMENT | Facility: CLINIC | Age: 78
End: 2023-09-25
Payer: MEDICARE

## 2023-09-26 ENCOUNTER — MEDICAL CORRESPONDENCE (OUTPATIENT)
Dept: HEALTH INFORMATION MANAGEMENT | Facility: CLINIC | Age: 78
End: 2023-09-26

## 2023-09-26 ENCOUNTER — HOSPITAL ENCOUNTER (OUTPATIENT)
Dept: MRI IMAGING | Facility: CLINIC | Age: 78
Discharge: HOME | End: 2023-09-26
Attending: PSYCHIATRY & NEUROLOGY | Admitting: PSYCHIATRY & NEUROLOGY
Payer: MEDICARE

## 2023-09-26 ENCOUNTER — ONCOLOGY VISIT (OUTPATIENT)
Dept: ONCOLOGY | Facility: CLINIC | Age: 78
End: 2023-09-26
Attending: PSYCHIATRY & NEUROLOGY
Payer: MEDICARE

## 2023-09-26 VITALS
DIASTOLIC BLOOD PRESSURE: 79 MMHG | SYSTOLIC BLOOD PRESSURE: 119 MMHG | TEMPERATURE: 98 F | RESPIRATION RATE: 18 BRPM | HEART RATE: 84 BPM | OXYGEN SATURATION: 95 %

## 2023-09-26 DIAGNOSIS — C71.9 GLIOBLASTOMA (H): ICD-10-CM

## 2023-09-26 DIAGNOSIS — C71.9 GLIOBLASTOMA (H): Primary | ICD-10-CM

## 2023-09-26 PROCEDURE — 99215 OFFICE O/P EST HI 40 MIN: CPT | Performed by: PSYCHIATRY & NEUROLOGY

## 2023-09-26 PROCEDURE — 255N000002 HC RX 255 OP 636: Performed by: PSYCHIATRY & NEUROLOGY

## 2023-09-26 PROCEDURE — 70553 MRI BRAIN STEM W/O & W/DYE: CPT

## 2023-09-26 PROCEDURE — A9585 GADOBUTROL INJECTION: HCPCS | Performed by: PSYCHIATRY & NEUROLOGY

## 2023-09-26 PROCEDURE — G0463 HOSPITAL OUTPT CLINIC VISIT: HCPCS | Performed by: PSYCHIATRY & NEUROLOGY

## 2023-09-26 RX ORDER — GADOBUTROL 604.72 MG/ML
15 INJECTION INTRAVENOUS ONCE
Status: COMPLETED | OUTPATIENT
Start: 2023-09-26 | End: 2023-09-26

## 2023-09-26 RX ADMIN — GADOBUTROL 15 ML: 604.72 INJECTION INTRAVENOUS at 10:56

## 2023-09-26 ASSESSMENT — PAIN SCALES - GENERAL: PAINLEVEL: NO PAIN (0)

## 2023-09-26 NOTE — LETTER
9/26/2023         RE: Olga Bailey  [address]  [facility] [address]        Dear Colleague,    Thank you for referring your patient, Olga Bailey, to the [facility]CER [facility]. Please see a copy of my visit note below.    NEURO-ONCOLOGY VISIT  Sep 26, 2023    CHIEF COMPLAINT: Ms. Olga Bailey is a 78 year old right-handed woman with a left frontoparietal glioblastoma (IDH wild-type, MGMT promoter methylated), diagnosed following gross total resection on 2/23/2021. Initiation of upfront cancer-directed treatment was delayed due to a complicated hospitalization. Given a resolving infection and lowered functional status, radiation alone was initiated on 5/4/2021 and completed on 5/27/2021.     Olga started adjuvant therapy with 150mg/m2 dosing of temozolomide, however, cycle 1 was complicated by significant hematologic toxicity. Dosing was changed to metronomic/ daily dosing in 7/2021 and this was well tolerated. Imaging from 8/2021 and 11/2021 demonstrates positive treatment effect.    Chemotherapy was placed on a hold in 10/2021 in the setting of severe constipation, but then, resumed. She has since completed the planned 6 months of adjuvant temozolomide as of 12/2021.    Imaging since 2/2022 has been without concern for cancer recurrence, however, imaging in 7/2022 showed a punctate area of contrast enhancement of indeterminate significance. Repeat imaging in 10/2022 showed near resolution of that punctate lesion and no evidence of cancer recurrence. Imaging in 1/2023 through 4/2023 has been without concern.     However, imaging in 8/2023 demonstrated changes of unclear significance that on repeat imaging in 9/2023 showed clear evidence of cancer recurrence.     Olga is presenting in follow-up accompanied by Fahad ().    HISTORY OF PRESENT ILLNESS  -Olga is living at [facility], which remains an ideal environment for her.   -Using a wheelchair, Fahad  feels that she remains strong. Able to assist with transfers.    Now working with PT. She is taking a steps with stand-by assist.   -Good energy. Not needing to nap.   -Mood is overall good. Very engaged.   -No headaches.  -No recurrent seizures since her last visit, tolerating Keppra.  -On anticoagulation with no significant signs/ symptoms of bleeding.      MEDICATIONS   Current Outpatient Medications   Medication     acetaminophen (TYLENOL) 500 MG tablet     albuterol (PROAIR HFA/PROVENTIL HFA/VENTOLIN HFA) 108 (90 Base) MCG/ACT inhaler     albuterol (PROVENTIL) (2.5 MG/3ML) 0.083% neb solution     amLODIPine (NORVASC) 5 MG tablet     busPIRone HCl (BUSPAR) 30 MG tablet     FLUoxetine (PROZAC) 20 MG capsule     ketoconazole (NIZORAL) 2 % external shampoo     levETIRAcetam (KEPPRA) 1000 MG tablet     levETIRAcetam (KEPPRA) 250 MG tablet     loperamide (IMODIUM) 2 MG capsule     melatonin 1 MG TABS tablet     multivitamin, therapeutic (THERA-VIT) TABS tablet     ondansetron (ZOFRAN) 4 MG tablet     pantoprazole (PROTONIX) 40 MG EC tablet     rivaroxaban ANTICOAGULANT (XARELTO ANTICOAGULANT) 20 MG TABS tablet     senna (SENOKOT) 8.6 MG tablet     docosanol (ABREVA) 10 % CREA cream     No current facility-administered medications for this visit.     Current Outpatient Medications   Medication Sig Dispense Refill     acetaminophen (TYLENOL) 500 MG tablet Take 500 mg by mouth every 4 hours as needed for mild pain        albuterol (PROAIR HFA/PROVENTIL HFA/VENTOLIN HFA) 108 (90 Base) MCG/ACT inhaler Inhale 2 puffs into the lungs every 4 hours as needed for wheezing 18 g 3     albuterol (PROVENTIL) (2.5 MG/3ML) 0.083% neb solution Take 1 vial (2.5 mg) by nebulization every 4 hours as needed for wheezing or shortness of breath / dyspnea       amLODIPine (NORVASC) 5 MG tablet Take 1 tablet (5 mg) by mouth daily (Patient taking differently: Take 5 mg by mouth daily Hold if SBP >160 or DBP <90)       busPIRone HCl (BUSPAR)  30 MG tablet Take 30 mg by mouth 2 times daily       FLUoxetine (PROZAC) 20 MG capsule Take 2 capsules (40 mg) by mouth daily 30 capsule 0     ketoconazole (NIZORAL) 2 % external shampoo Apply topically twice a week On Wed & Sun       levETIRAcetam (KEPPRA) 1000 MG tablet Take 1,000 mg by mouth 2 times daily Give with 250mg tab = 1250mg total dose twice daily       levETIRAcetam (KEPPRA) 250 MG tablet Take 250 mg by mouth 2 times daily Give with 100mg tab = 1250mg       loperamide (IMODIUM) 2 MG capsule TAKE 2 CAPSULES (4MG) BY MOUTH AFTER FIRST LOOSE STOOL, THEN;TAKE 1 CAPSULE AFTER CONSECUTIVE STOOLS, MAX 4 CAPS/24H       melatonin 1 MG TABS tablet Take 5 tablets (5 mg) by mouth nightly as needed for sleep       multivitamin, therapeutic (THERA-VIT) TABS tablet Take 1 tablet by mouth daily       ondansetron (ZOFRAN) 4 MG tablet Take 1 tablet (4 mg) by mouth every 8 hours as needed for nausea 30 tablet 3     pantoprazole (PROTONIX) 40 MG EC tablet Take 1 tablet (40 mg) by mouth 2 times daily (before meals) 30 tablet 0     rivaroxaban ANTICOAGULANT (XARELTO ANTICOAGULANT) 20 MG TABS tablet Take 1 tablet (20 mg) by mouth daily (with dinner) 30 tablet 3     senna (SENOKOT) 8.6 MG tablet Senokot 8.6 mg tablet       docosanol (ABREVA) 10 % CREA cream Apply topically 5 times daily Apply to cold sore on lip (Patient not taking: Reported on 9/26/2023)       DRUG ALLERGIES   Allergies   Allergen Reactions     Pilocarpine Headache and Nausea and Vomiting     Chlorhexidine Itching and Rash     Aripiprazole Headache and Other (See Comments)     Insomnia, nightmares     Bacitracin Rash     Bactroban is effective; No difficulties     Erythromycin      intolerant.     Meperidine Hcl      intolerant only   Demerol     Chlorhexidine Gluconate Rash       IMMUNIZATIONS   Immunization History   Administered Date(s) Administered     COVID-19 Bivalent 18+ (Moderna) 12/13/2022     COVID-19 Monovalent 18+ (Moderna) 03/18/2021, 07/22/2021,  01/18/2022     Flu 65+ Years 09/28/2020     HepB 01/01/1990     Influenza (IIV3) PF 01/01/2001     Influenza Vaccine 65+ (Fluzone HD) 09/28/2020     Influenza Vaccine >6 months (Alfuria,Fluzone) 09/28/2020     Pneumo Conj 13-V (2010&after) 10/29/2014     Pneumococcal 23 valent 07/22/2020     TDAP Vaccine (Boostrix) 08/22/2016     Tetanus 06/13/2004     Zoster recombinant adjuvanted (SHINGRIX) 12/24/2019, 10/12/2020     Zoster vaccine, live 12/18/2008       ONCOLOGIC HISTORY  -2/2021 PRESENTATION: Progressive aphasia.   -2/19/2021 MR brain imaging with 3.3 x 2.8 x 2.8 cm enhancing mass in left frontal-parietal region. A second contrast enhancing lesion (0.5 x 1.0 x 1.0 cm) in right occipital lobe which is dural based and largely stable in size since 9/2011; likely representing a meningioma.  -2/23/2021 SURGERY: Gross total resection by Dr. Cummings  PATHOLOGY: Glioblastoma; IDH1-R132H wild-type/ IDH 1 and 2 wildtype, MGMT promoter methylated. Not BRAF mutated.   -3/23/2021 NEURO-ONC: Recommending chemoradiotherapy.   -3/2021 ADMISSION: SOB and chest pain; CT PA negative, but bilateral DVT noted on U/S. Started on Lovenox. Acute hypoxic respiratory failure in the setting of bilateral pneumonia; presumed PJP, concern for transfusion-related acute lung injury and right hemidiaphragm paralysis. Seizure. Right retroperitoneal hematoma. Ileus. Non-severe malnutrition on TPN with concern for refeeding syndrome. 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 Shock Liver.  -5/4 - 5/27/2021 RADS: 15 fractions.   -5/25/2021 NEURO-ONC/ DEVICE: Discussed the role of Optune; to be considered. Repeat MRI in 4 weeks with plans to start adjuvant temozolomide.   -6/22/2021 NEURO-ONC/ MRB/ CHEMO: Clinically improving. Imaging largely stable. Starting adjuvant temozolomide 150mg/m2 (250mg), cycle 1 (start date 6/24).   -7/20/2021 NEURO-ONC/ CHEMO: Clinically improving. Thrombocytopenia noted with adjuvant temozolomide dosing, platelets of 26K; will  transition to metronomic dosing 50mg/m2 (100mg) daily to start once platelets are > 80K.    -8/17/2021 NEURO-ONC/ MRB/ CHEMO: Clinically improving. Saw Dr. Gamboa, who recommended starting anticoagulation; on Eliquis. Imaging with positive treatment response. Continue metronomic/ daily dosing at 50mg/m2 (100mg) through 12/2021.    -9/14/2021 NEURO-ONC/ CHEMO: Clinically improving. Continue metronomic/ daily dosing at 50mg/m2 (100mg) through 12/2021.    -10/12/2021 NEURO-ONC/ CHEMO: Clinically improving. Holding temozolomide and Zofran in the setting of severe constipation. Abdominal x-ray with high stool burden; consulted Dr. Hdoge for management of constipation given history of ileus.   -10/28/2021 CHEMO: Temozolomide restarted.   -11/16/2021 NEURO-ONC/ MRB/ CHEMO: Clinically improving Less constipation, continued low appetite; referral to palliative care for mental health management. Referral to Dr. Agudelo to optimize rehab. Imaging with continued positive treatment response. Continue daily temozolomide.   -12/21/2021 NEURO-ONC/ CHEMO: Clinically improving in terms of appetite, energy. Stopping daily temozolomide at the end of the month. Discussion with Dr. Gamboa regarding removal of IVC filter.   -2/14/2022 MRB with a stable postoperative changes of left parietal craniotomy and tumor resection.   -3/1/2022 NEURO-ONC: Clinically deconditioned, mood is depressed; following with Cancer PM&R and Palliative Care. With imaging stable, continue imaging surveillance.   -4/4/2022 MRB with stable postoperative change of left-sided craniotomy and tumor resection.  -7/19/2022 NEURO-ONC/ MRB: Clinically improving. Imaging with no overt evidence of cancer recurrence, new punctate area of contrast enhancement of indeterminate significance; continue imaging surveillance.   -10/4/2022 NEURO-ONC/ MRB: Clinically with improved cognition. Recommend stopping ritalin in light of no known benefit and worsening tremor. Reestablish care  "with Dr. Agudelo. Imaging with no overt evidence of cancer recurrence, the punctate area of contrast enhancement nearly resolved; continue imaging surveillance.   -1/17/2023 NEURO-ONC/ MRB: Stable neurological deficits; recommending neuropsych evaluation to objectively assess cognition and capacity. Imaging with no overt evidence of cancer recurrence.  -4/18/2023 NEURO-ONC/ MRB: Decrease in Hb. Completed neuro-psych evaluation; diagnosed as having dementia and is not capable of independent decision-making. Imaging with no new concerns.   -8/22/2023 NEURO-ONC/ MRB: No new neurological concerns. Imaging with changes of unclear significance; repeat in 1 month.   -9/26/2023 NEURO-ONC/ MRB: No new neurological concerns. Imaging with evidence of cancer recurrence. Referral to radiation oncology for a discussion about repeat radiation. Remaining focused on maintaining a good quality of life.        PHYSICAL EXAMINATION  /79 (BP Location: Right arm, Patient Position: Sitting, Cuff Size: Adult Large)   Pulse 84   Temp 98  F (36.7  C) (Oral)   Resp 18   SpO2 95%   Wt Readings from Last 2 Encounters:   04/18/23 66.2 kg (146 lb)   01/17/23 64.6 kg (142 lb 6.7 oz)      Ht Readings from Last 2 Encounters:   08/29/23 1.6 m (5' 3\")   08/22/23 1.6 m (5' 3\")     KPS: 50    -Good energy today.  -Psychiatric: Good mood. Pleasant.  -Neurologic:   MENTAL STATUS:     Alert.   Speech with much hesitation.      Some confusion, reduced comprehension.    CRANIAL NERVES:     Pupils are equal, round.     No facial droop.   Hearing slightly decreased bilaterally.   Normal phonation.   MOTOR: Resting and intention tremor of the left hand.   GAIT: Sitting in a wheelchair.        MEDICAL RECORDS  Personally reviewed; PM&R notes.    LABS  Personally reviewed all available lab results; No new labs since June.     IMAGING  Personally reviewed MR brain imaging from today and compared to prior imaging. To my eye, there has been an interval " "increase in contrast enhancement about the resection cavity with an associated increase in perfusion (below). There is also an increase in contrast enhancement in the posterior-inferior lateral left frontal region that is anterior to the cavity.         Formal read; \"Increased size/extent of nodular enhancement about the left frontoparietal resection cavity, concerning for disease progression.\"    Imaging was shown to and results were reviewed with Olga and Fahad.       IMPRESSION  Clinic time of 50 minutes was spent reviewing data, in evaluation, and coordinating care for this high complexity visit. This was in addition to answering questions pertaining to my recommendations and devising the plan as outlined below.      Today, Olga's functional status remains stable in terms of her known neurological deficits related to her strength/ gait stability and cognition. I have also reviewed Dr. Agudelo's note from August in which the recommendation was to continue to with rehab therapies.     Imaging as detailed above with changes that are now concerning for cancer recurrence. Therefore, today I discussed with Olga and Fahad potential future options for management. First, there should be consideration for repeat surgery. However, given her functional status and known diagnosis of dementia, recovery from surgery would have significant challenges. In addition, given the multifocality of her recurrence, surgery would likely be of limited utility. I have reached out to a neurosurgical colleague to confirm my impression and he is currently reviewing her case and imaging. Next, given that Olga completed her initial course of radiation > 2 years ago, a repeat course could be considered. I will place a referral to radiation oncology for a more in depth discussion on the risks/ benefits of repeat radiation. Should the treatment plan be to initiate radiation, given that Olga completed temozolomide ~1.5 years ago " with a positive sustained treatment response and that the cancer is MGMT promoter methylated, repeat use of adjuvant temozolomide following radiation is also an appropriate option.     Lastly, I discussed with Olga and Fahad today that aggressive management of cancer has the potential of reducing one's quality of life, particularly if a patient in not in a position to tolerate the treatment well. Both Olga and Fahad are acutely aware that she has a type of brain cancer for which there is currently no cure and therefore, there needs to be care taken in balancing side effects of treatment with a significant and inappropriate reduction in quality of life. At this time, I am in support of ongoing cancer-directed therapy as long as this therapy and all associated toxicity is in line with Olga's goals of care.     Of note, per results of a recent neuro-psych evaluation, Olga carries the diagnosis of dementia and therefore, she is not capable of independent decision-making due to a lack in capacity and will require considerable support and guidance for important decisions.    PROBLEM LIST  Glioblastoma  Aphasia  Apraxia  Lacks capacity, dementia  Gait instability  Fatigue    PLAN  -CANCER DIRECTED THERAPY-  -Referral to radiation oncology.     -STEROIDS-  -Off dexamethasone.    -SEIZURE MANAGEMENT-  -Given history of seizures, antiepileptics are indicated.   -Continue Keppra.   -No abortive therapy needed at this time.     -DVT-  -Following with Dr. Gamboa; currently on Eliquis.   -Requires lifelong anticoagulation given cancer diagnosis.   -IVC filter in place.     -Quality of life/ MOOD/ FATIGUE-  -History of depressed mood, PTSD, poor motivation, and poor appetite.   -Previously followed with palliative care to assess mental health and manage medications.  -Cannot tolerate CPAP for MARCELLE.    -REHAB NEEDS-  -PT/ OT.   -Following with Dr. Agudelo in cancer rehab.    -COGNITIVE IMPAIRMENT-  -Completed  "neuropsychiatric evaluation; diagnosed with dementia.   -Olga dose not have capacity for complex decision making regarding any legal or medical choices.     Return to clinic pending discussion with radiation oncology.     Stephanie Baker MD  Neuro-oncology      Oncology Rooming Note    September 26, 2023 2:10 PM   Olga Bailey is a 78 year old female who presents for:    Chief Complaint   Patient presents with     Oncology Clinic Visit     Glioblastoma -- S/P Surg Resection 2/23/21       Initial Vitals: /79 (BP Location: Right arm, Patient Position: Sitting, Cuff Size: Adult Large)   Pulse 84   Temp 98  F (36.7  C) (Oral)   Resp 18   SpO2 95%  Estimated body mass index is 25.86 kg/m  as calculated from the following:    Height as of 8/29/23: 1.6 m (5' 3\").    Weight as of 4/18/23: 66.2 kg (146 lb). There is no height or weight on file to calculate BSA.  No Pain (0) Comment: Data Unavailable   No LMP recorded. Patient has had a hysterectomy.  Allergies reviewed: Yes  Medications reviewed: Yes    Medications: Medication refills not needed today.  Pharmacy name entered into [employer]: [facility] - [place], [place] - [address]    Clinical concerns: no       Paula Turner [facility]                  Again, thank you for allowing me to participate in the care of your patient.        Sincerely,        Stephanie Baker MD    "

## 2023-09-26 NOTE — PROGRESS NOTES
"Oncology Rooming Note    September 26, 2023 2:10 PM   Olga Bailey is a 78 year old female who presents for:    Chief Complaint   Patient presents with    Oncology Clinic Visit     Glioblastoma -- S/P Surg Resection 2/23/21       Initial Vitals: /79 (BP Location: Right arm, Patient Position: Sitting, Cuff Size: Adult Large)   Pulse 84   Temp 98  F (36.7  C) (Oral)   Resp 18   SpO2 95%  Estimated body mass index is 25.86 kg/m  as calculated from the following:    Height as of 8/29/23: 1.6 m (5' 3\").    Weight as of 4/18/23: 66.2 kg (146 lb). There is no height or weight on file to calculate BSA.  No Pain (0) Comment: Data Unavailable   No LMP recorded. Patient has had a hysterectomy.  Allergies reviewed: Yes  Medications reviewed: Yes    Medications: Medication refills not needed today.  Pharmacy name entered into [employer]: [facility] - [place], [place] - [address]    Clinical concerns: no       Paula Turner CMA              "

## 2023-09-27 ENCOUNTER — TRANSFERRED RECORDS (OUTPATIENT)
Dept: MEDSURG UNIT | Facility: CLINIC | Age: 78
End: 2023-09-27
Payer: MEDICARE

## 2023-10-03 ENCOUNTER — TRANSFERRED RECORDS (OUTPATIENT)
Dept: HEALTH INFORMATION MANAGEMENT | Facility: CLINIC | Age: 78
End: 2023-10-03
Payer: MEDICARE

## 2023-10-04 DIAGNOSIS — C71.9 GLIOBLASTOMA (H): Primary | ICD-10-CM

## 2023-10-04 RX ORDER — ALBUTEROL SULFATE 0.83 MG/ML
2.5 SOLUTION RESPIRATORY (INHALATION)
Status: CANCELLED | OUTPATIENT
Start: 2023-10-18

## 2023-10-04 RX ORDER — HEPARIN SODIUM (PORCINE) LOCK FLUSH IV SOLN 100 UNIT/ML 100 UNIT/ML
5 SOLUTION INTRAVENOUS
Status: CANCELLED | OUTPATIENT
Start: 2023-10-18

## 2023-10-04 RX ORDER — HEPARIN SODIUM,PORCINE 10 UNIT/ML
5-20 VIAL (ML) INTRAVENOUS DAILY PRN
Status: CANCELLED | OUTPATIENT
Start: 2023-10-18

## 2023-10-04 RX ORDER — EPINEPHRINE 1 MG/ML
0.3 INJECTION, SOLUTION INTRAMUSCULAR; SUBCUTANEOUS EVERY 5 MIN PRN
Status: CANCELLED | OUTPATIENT
Start: 2023-10-18

## 2023-10-04 RX ORDER — DIPHENHYDRAMINE HYDROCHLORIDE 50 MG/ML
50 INJECTION INTRAMUSCULAR; INTRAVENOUS
Status: CANCELLED
Start: 2023-10-18

## 2023-10-04 RX ORDER — LORAZEPAM 2 MG/ML
0.5 INJECTION INTRAMUSCULAR EVERY 4 HOURS PRN
Status: CANCELLED | OUTPATIENT
Start: 2023-10-18

## 2023-10-04 RX ORDER — MEPERIDINE HYDROCHLORIDE 25 MG/ML
25 INJECTION INTRAMUSCULAR; INTRAVENOUS; SUBCUTANEOUS EVERY 30 MIN PRN
Status: CANCELLED | OUTPATIENT
Start: 2023-10-18

## 2023-10-04 RX ORDER — METHYLPREDNISOLONE SODIUM SUCCINATE 125 MG/2ML
125 INJECTION, POWDER, LYOPHILIZED, FOR SOLUTION INTRAMUSCULAR; INTRAVENOUS
Status: CANCELLED
Start: 2023-10-18

## 2023-10-04 RX ORDER — ALBUTEROL SULFATE 90 UG/1
1-2 AEROSOL, METERED RESPIRATORY (INHALATION)
Status: CANCELLED
Start: 2023-10-18

## 2023-10-05 ENCOUNTER — PATIENT OUTREACH (OUTPATIENT)
Dept: ONCOLOGY | Facility: CLINIC | Age: 78
End: 2023-10-05
Payer: MEDICARE

## 2023-10-05 ENCOUNTER — TRANSFERRED RECORDS (OUTPATIENT)
Dept: HEALTH INFORMATION MANAGEMENT | Facility: CLINIC | Age: 78
End: 2023-10-05
Payer: MEDICARE

## 2023-10-05 NOTE — PROGRESS NOTES
[facility]: Cancer Care                                                                                          Called to set up appointment for Olga with Mayra to discuss adding polina to her treatment plan.     Signature:  Nafisa Vee RN

## 2023-10-06 ENCOUNTER — MEDICAL CORRESPONDENCE (OUTPATIENT)
Dept: HEALTH INFORMATION MANAGEMENT | Facility: CLINIC | Age: 78
End: 2023-10-06

## 2023-10-06 ENCOUNTER — HOSPITAL ENCOUNTER (INPATIENT)
Facility: CLINIC | Age: 78
LOS: 52 days | Discharge: NURSING, REHAB OR LONG-TERM CARE | DRG: 054 | End: 2023-11-28
Attending: EMERGENCY MEDICINE | Admitting: EMERGENCY MEDICINE
Payer: MEDICARE

## 2023-10-06 ENCOUNTER — PATIENT OUTREACH (OUTPATIENT)
Dept: ONCOLOGY | Facility: CLINIC | Age: 78
End: 2023-10-06
Payer: MEDICARE

## 2023-10-06 DIAGNOSIS — C71.9 GLIOBLASTOMA (H): ICD-10-CM

## 2023-10-06 DIAGNOSIS — G40.909 RECURRENT SEIZURES (H): ICD-10-CM

## 2023-10-06 DIAGNOSIS — Z20.822 LAB TEST NEGATIVE FOR COVID-19 VIRUS: Primary | ICD-10-CM

## 2023-10-06 DIAGNOSIS — J34.89 RHINORRHEA: ICD-10-CM

## 2023-10-06 DIAGNOSIS — R26.2 DIFFICULTY WALKING: ICD-10-CM

## 2023-10-06 DIAGNOSIS — R41.82 ALTERED MENTAL STATUS, UNSPECIFIED ALTERED MENTAL STATUS TYPE: ICD-10-CM

## 2023-10-06 DIAGNOSIS — K64.4 EXTERNAL HEMORRHOIDS: ICD-10-CM

## 2023-10-06 DIAGNOSIS — K59.00 CONSTIPATION, UNSPECIFIED CONSTIPATION TYPE: ICD-10-CM

## 2023-10-06 LAB
ANION GAP SERPL CALCULATED.3IONS-SCNC: 11 MMOL/L (ref 7–15)
APTT PPP: 43 SECONDS (ref 22–38)
BASO+EOS+MONOS # BLD AUTO: ABNORMAL 10*3/UL
BASO+EOS+MONOS NFR BLD AUTO: ABNORMAL %
BASOPHILS # BLD AUTO: 0 10E3/UL (ref 0–0.2)
BASOPHILS NFR BLD AUTO: 1 %
BUN SERPL-MCNC: 13.7 MG/DL (ref 8–23)
CALCIUM SERPL-MCNC: 9.1 MG/DL (ref 8.8–10.2)
CHLORIDE SERPL-SCNC: 101 MMOL/L (ref 98–107)
CREAT SERPL-MCNC: 0.94 MG/DL (ref 0.51–0.95)
DEPRECATED HCO3 PLAS-SCNC: 25 MMOL/L (ref 22–29)
EGFRCR SERPLBLD CKD-EPI 2021: 62 ML/MIN/1.73M2
EOSINOPHIL # BLD AUTO: 0.4 10E3/UL (ref 0–0.7)
EOSINOPHIL NFR BLD AUTO: 6 %
ERYTHROCYTE [DISTWIDTH] IN BLOOD BY AUTOMATED COUNT: 12.8 % (ref 10–15)
GLUCOSE BLDC GLUCOMTR-MCNC: 116 MG/DL (ref 70–99)
GLUCOSE SERPL-MCNC: 112 MG/DL (ref 70–99)
HCT VFR BLD AUTO: 33.6 % (ref 35–47)
HGB BLD-MCNC: 10.8 G/DL (ref 11.7–15.7)
IMM GRANULOCYTES # BLD: 0 10E3/UL
IMM GRANULOCYTES NFR BLD: 0 %
INR PPP: 2.27 (ref 0.85–1.15)
LYMPHOCYTES # BLD AUTO: 0.7 10E3/UL (ref 0.8–5.3)
LYMPHOCYTES NFR BLD AUTO: 11 %
MCH RBC QN AUTO: 29.7 PG (ref 26.5–33)
MCHC RBC AUTO-ENTMCNC: 32.1 G/DL (ref 31.5–36.5)
MCV RBC AUTO: 92 FL (ref 78–100)
MONOCYTES # BLD AUTO: 0.7 10E3/UL (ref 0–1.3)
MONOCYTES NFR BLD AUTO: 12 %
NEUTROPHILS # BLD AUTO: 4.4 10E3/UL (ref 1.6–8.3)
NEUTROPHILS NFR BLD AUTO: 70 %
NRBC # BLD AUTO: 0 10E3/UL
NRBC BLD AUTO-RTO: 0 /100
PLATELET # BLD AUTO: 186 10E3/UL (ref 150–450)
POTASSIUM SERPL-SCNC: 4 MMOL/L (ref 3.4–5.3)
RBC # BLD AUTO: 3.64 10E6/UL (ref 3.8–5.2)
SODIUM SERPL-SCNC: 137 MMOL/L (ref 135–145)
WBC # BLD AUTO: 6.2 10E3/UL (ref 4–11)

## 2023-10-06 PROCEDURE — 93005 ELECTROCARDIOGRAM TRACING: CPT | Performed by: EMERGENCY MEDICINE

## 2023-10-06 PROCEDURE — 96360 HYDRATION IV INFUSION INIT: CPT | Performed by: EMERGENCY MEDICINE

## 2023-10-06 PROCEDURE — 82962 GLUCOSE BLOOD TEST: CPT

## 2023-10-06 PROCEDURE — 99285 EMERGENCY DEPT VISIT HI MDM: CPT | Mod: 25 | Performed by: EMERGENCY MEDICINE

## 2023-10-06 PROCEDURE — 93010 ELECTROCARDIOGRAM REPORT: CPT | Performed by: EMERGENCY MEDICINE

## 2023-10-06 PROCEDURE — 85610 PROTHROMBIN TIME: CPT | Performed by: EMERGENCY MEDICINE

## 2023-10-06 PROCEDURE — 80048 BASIC METABOLIC PNL TOTAL CA: CPT | Performed by: EMERGENCY MEDICINE

## 2023-10-06 PROCEDURE — 80177 DRUG SCRN QUAN LEVETIRACETAM: CPT | Performed by: EMERGENCY MEDICINE

## 2023-10-06 PROCEDURE — 84484 ASSAY OF TROPONIN QUANT: CPT | Performed by: EMERGENCY MEDICINE

## 2023-10-06 PROCEDURE — 85025 COMPLETE CBC W/AUTO DIFF WBC: CPT | Performed by: EMERGENCY MEDICINE

## 2023-10-06 PROCEDURE — 258N000003 HC RX IP 258 OP 636: Performed by: EMERGENCY MEDICINE

## 2023-10-06 PROCEDURE — 36415 COLL VENOUS BLD VENIPUNCTURE: CPT | Performed by: EMERGENCY MEDICINE

## 2023-10-06 PROCEDURE — 85730 THROMBOPLASTIN TIME PARTIAL: CPT | Performed by: EMERGENCY MEDICINE

## 2023-10-06 PROCEDURE — 96361 HYDRATE IV INFUSION ADD-ON: CPT | Performed by: EMERGENCY MEDICINE

## 2023-10-06 RX ORDER — SODIUM CHLORIDE 9 MG/ML
INJECTION, SOLUTION INTRAVENOUS
Status: DISCONTINUED
Start: 2023-10-06 | End: 2023-10-07 | Stop reason: HOSPADM

## 2023-10-06 RX ORDER — SODIUM CHLORIDE 9 MG/ML
INJECTION, SOLUTION INTRAVENOUS CONTINUOUS
Status: DISCONTINUED | OUTPATIENT
Start: 2023-10-06 | End: 2023-10-07

## 2023-10-06 RX ADMIN — SODIUM CHLORIDE: 9 INJECTION, SOLUTION INTRAVENOUS at 23:34

## 2023-10-06 ASSESSMENT — ACTIVITIES OF DAILY LIVING (ADL): ADLS_ACUITY_SCORE: 39

## 2023-10-06 NOTE — LETTER
Transition Communication Hand-off for Care Transitions to Next Level of Care Provider    Name: Olga Bailey  : 1945  MRN #: [id number]  Primary Care Provider: [facility]     Primary Clinic: [address]  [facility] [address]     Reason for Hospitalization:  Altered mental status, unspecified altered mental status type [R41.82]  Admit Date/Time: 10/6/2023 10:25 PM  Discharge Date: Medically Ready  Payor Source: Payor: MEDICARE / Plan: MEDICARE / Product Type: Medicare /     R  Discharge Plan:  Discharge Plan:      Flowsheet Row Most Recent Value   Disposition Comments n/a             Discharge Needs Assessment:  Needs      Flowsheet Row Most Recent Value   Anticipated Changes Related to Illness inability to care for self   Equipment Currently Used at Home grab bar, toilet, grab bar, tub/shower, hospital bed, shower chair, walker, rolling, wheelchair, manual   # of Referrals Placed by CM --  [LongTerm Care]            AFollow-up plan:    Future Appointments   Date Time Provider Department Center   2023  1:30 PM [facility] OT WAITLIST [facility]   2024 11:30 AM Yesenia Agudelo MD [facility]           Key Recommendations:      Ruth Triana, LIBBY  [phone]    AVS/Discharge Summary is the source of truth; this is a helpful guide for improved communication of patient story

## 2023-10-06 NOTE — PROGRESS NOTES
[facility]: Cancer Care Plan of Care Education Note                                    Discussion with Patient:                                                         Education concerns: Kathy infusions        Assessment:                                                      Assessment completed with:: Patient;Spouse or significant other    Current living arrangement  I live in a nursing home    Plan of Care Education   Yearly learning assessment completed?: Yes (see Education tab)  Diagnosis:: GBM with progression  Does patient understand diagnosis?: Yes  Tx plan/regimen:: Kathy and radiation  Does patient understand treatment plan/regimen?: Yes  Preparing for treatment:: Reviewed treatment preparation information with patient (vascular access, day of chemo, visitor policy, what to bring, etc.)  Vascular access:: Peripheral IV  Transportation means:: Family  Safety/self care at home reviewed with patient:: Yes  Informal Support system:: Family  Coping - concerns/fears reviewed with patient:: Yes  Plan of Care:: Imaging;Treatment schedule;RICKY follow-up appointment  When to call provider:: Bleeding;Uncontrolled nausea/vomiting;Increased shortness of breath;Shaking chills;New/worsening pain;Temperature >100.4F;Uncontrolled diarrhea/constipation  Reasons for deferring treatment reviewed with patient:: Yes    Evaluation of Learning  Patient Education Provided: Yes  Readiness:: Acceptance  Method:: Explanation  Response:: Demonstrates understanding    No assessment indicated    Intervention/Education provided during outreach:                                                       Discussed Kathy therapy   Will send msg to schedulers to make infusion/ramsey appt    Patient to follow up as scheduled at next appt  Patient to call/[employer] message with updates  Confirmed patient has clinic and triage numbers    Signature:  Nafisa Vee RN

## 2023-10-06 NOTE — LETTER
Transition Communication Hand-off for Care Transitions to Next Level of Care Provider    Name: Olga Bailey  : 1945  MRN #: [id number]  Primary Care Provider: [facility]     Primary Clinic: [address]  [facility] [address]     Reason for Hospitalization:  Altered mental status, unspecified altered mental status type [R41.82]  Admit Date/Time: 10/6/2023 10:25 PM  Discharge Date: Medically Ready  Payor Source: Payor: MEDICARE / Plan: MEDICARE / Product Type: Medicare /       Discharge Plan:  Discharge Plan:      Flowsheet Row Most Recent Value   Disposition Comments n/a             Discharge Needs Assessment:  Needs      Flowsheet Row Most Recent Value   Anticipated Changes Related to Illness inability to care for self   Equipment Currently Used at Home grab bar, toilet, grab bar, tub/shower, hospital bed, shower chair, walker, rolling, wheelchair, manual   # of Referrals Placed by CM --  [LongTerm Care]            AFollow-up plan:    Future Appointments   Date Time Provider Department Center   2023  1:30 PM Jana Sanabria OTR [facility]   2024 11:30 AM Yesenia Agudelo MD [facility]             Key Recommendations:  [facility]    Ruth Triana, BSW  [phone]    AVS/Discharge Summary is the source of truth; this is a helpful guide for improved communication of patient story

## 2023-10-06 NOTE — LETTER
Transition Communication Hand-off for Care Transitions to Next Level of Care Provider    Name: Olga Bailey  : 1945  MRN #: [id number]  Primary Care Provider: [facility]     Primary Clinic: [address]  [facility] [address]     Reason for Hospitalization:  Altered mental status, unspecified altered mental status type [R41.82]  Admit Date/Time: 10/6/2023 10:25 PM  Discharge Date: 23  Payor Source: Payor: MEDICARE / Plan: MEDICARE / Product Type: Medicare /       Discharge Plan:  Discharge Plan:      Flowsheet Row Most Recent Value   Disposition Comments n/a             Discharge Needs Assessment:  Needs      Flowsheet Row Most Recent Value   Anticipated Changes Related to Illness inability to care for self   Equipment Currently Used at Home grab bar, toilet, grab bar, tub/shower, hospital bed, shower chair, walker, rolling, wheelchair, manual   # of Referrals Placed by CM Senior Linkage Line  [TCU]              Follow-up plan:    Future Appointments   Date Time Provider Department Center   2023  1:00 PM Ruth Madrigal, PT [facility] O   2023  1:30 PM Jana Sanabria, OTR [facility] O   2023  8:00 AM Rosemary Swann, SLP [facility] O   2024 11:30 AM Yesenia Agudelo MD [facility] LEI       Any outstanding tests or procedures:        Referrals       Future Labs/Procedures    Adult Physical Medicine and Rehab  Referral     Process Instructions:    For spine care, please use the spine referral.    Consider Adult E-Consult to Physical Medicine & Rehabilitation for the following conditions: back and neck pain, concussion, musculoskeletal pain or injury.  E-Consult Cost: </=$10.    Comments:    Please be aware that coverage of these services is subject to the terms and limitations of your health insurance plan.  Call member services at your health plan with any benefit or coverage questions.  [facility] will call you to  coordinate your care as prescribed by your provider. A representative will call you within 2 business days to help schedule your appointment, or you may contact the  Representative at [phone].      Speech Therapy Referral     Process Instructions:    *This therapy referral will be filtered to a centralized scheduling office at [facility] and the patient will receive a call to schedule an appointment at a [place] location most convenient for them. *    Comments:    Please be aware that coverage of these services is subject to the terms and limitations of your health insurance plan.  Call member services at your health plan with any benefit or coverage questions.  If you have not heard from the scheduling office within 2 business days, please call [phone] for [place], [phone] for [employer] and [phone] for [place].            Supplies       Future Labs/Procedures    Miscellaneous DME Order     Process Instructions:    By signing this order, the Authorizing Provider is attesting that they have completed a face-to-face evaluation on the patient to determine their need for this equipment in the last 60 days. A new face-to-face evaluation is required each time  A new prescription for one of the specified items is ordered.   If an additional provider completed the evaluation, please indicate their name below.     **As of 2018, an order requisition and face sheet will print for all DME orders. Please give printed order and face sheet to patient if not obtaining product from [facility] Medical DME closet.     Comments:    DME Documentation:   Describe the reason for need to support medical necessity:   H/o L frontoparietal glioblastoma s/p resection, chemotherapy and radiation (completed May 2021), with recurrence of malignancy 2023   Cognitive and functional impairment due to glioblastoma and chemoradiation   Brocas aphasia  Dementia  Deconditioning, trouble  with getting to bathroom to urinate  .     I, the undersigned, certify that the above prescribed supplies are medically necessary for this patient and is both reasonable and necessary in reference to accepted standards of medical and necessary in reference to accepted standards of medical practice in the treatment of this patient's condition and is not prescribed as a convenience.            Key Recommendations:    Nursing station number is [phone]    LIBBY Oliver  [phone]    AVS/Discharge Summary is the source of truth; this is a helpful guide for improved communication of patient story

## 2023-10-06 NOTE — LETTER
Transition Communication Hand-off for Care Transitions to Next Level of Care Provider    Name: Olga Bailey  : 1945  MRN #: [id number]  Primary Care Provider: [facility]     Primary Clinic: [address]  [facility] [address]     Reason for Hospitalization:  Altered mental status, unspecified altered mental status type [R41.82]  Admit Date/Time: 10/6/2023 10:25 PM  Discharge Date: Medically ready for discharge  Payor Source: Payor: MEDICARE / Plan: MEDICARE / Product Type: Medicare /       Plan of Care Goals/Milestone Events:   Patient Concerns: Having enough PT/OT to work on building strength   Patient Goals:   Short-term Work hard in therapies   Long-term Return to baseline          Reason for Communication Hand-off Referral: Other Placement in a Long Term Facility    Discharge Needs Assessment:  Needs      Flowsheet Row Most Recent Value   Anticipated Changes Related to Illness inability to care for self   Equipment Currently Used at Home grab bar, toilet, grab bar, tub/shower, hospital bed, shower chair, walker, rolling, wheelchair, manual   # of Referrals Placed by CM Post Acute Facilities            Follow-up plan:    Future Appointments   Date Time Provider Department Center   11/10/2023  3:00 PM Ruth Madrigal, PT [facility]   11/15/2023  8:00 AM  [facility]   2024 11:30 AM Yesenia Agudelo MD [facility]       Any outstanding tests or procedures:              [place] Recommendations:  Olga has an apartment at [facility]. [facility] is unable to meet pt's needs at this time. Pt is looking to transfer to a [facility] LTC facility.    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 She is very determined to rebuild her strength and enjoy her life.  I have told her how beautiful [facility] is. olga and her  Fahad are looking forward to hearing from you. too        Ruth Triana, BSW  [phone]    AVS/Discharge Summary is the source of truth; this is a helpful guide for improved communication of patient story

## 2023-10-06 NOTE — LETTER
Transition Communication Hand-off for Care Transitions to Next Level of Care Provider    Name: Olga Bailey  : 1945  MRN #: [id number]  Primary Care Provider: [facility]     Primary Clinic: [address]  [facility] [address]     Reason for Hospitalization:  Altered mental status, unspecified altered mental status type [R41.82]  Admit Date/Time: 10/6/2023 10:25 PM        Key Recommendations:      LIBBY Olivre  [phone]    AVS/Discharge Summary is the source of truth; this is a helpful guide for improved communication of patient story

## 2023-10-07 ENCOUNTER — ANCILLARY PROCEDURE (OUTPATIENT)
Dept: NEUROLOGY | Facility: CLINIC | Age: 78
DRG: 054 | End: 2023-10-07
Attending: STUDENT IN AN ORGANIZED HEALTH CARE EDUCATION/TRAINING PROGRAM
Payer: MEDICARE

## 2023-10-07 ENCOUNTER — APPOINTMENT (OUTPATIENT)
Dept: CT IMAGING | Facility: CLINIC | Age: 78
DRG: 054 | End: 2023-10-07
Attending: EMERGENCY MEDICINE
Payer: MEDICARE

## 2023-10-07 ENCOUNTER — APPOINTMENT (OUTPATIENT)
Dept: MRI IMAGING | Facility: CLINIC | Age: 78
DRG: 054 | End: 2023-10-07
Attending: STUDENT IN AN ORGANIZED HEALTH CARE EDUCATION/TRAINING PROGRAM
Payer: MEDICARE

## 2023-10-07 ENCOUNTER — APPOINTMENT (OUTPATIENT)
Dept: GENERAL RADIOLOGY | Facility: CLINIC | Age: 78
DRG: 054 | End: 2023-10-07
Attending: EMERGENCY MEDICINE
Payer: MEDICARE

## 2023-10-07 PROBLEM — R41.82 ALTERED MENTAL STATUS, UNSPECIFIED ALTERED MENTAL STATUS TYPE: Status: ACTIVE | Noted: 2023-10-07

## 2023-10-07 LAB
ALBUMIN UR-MCNC: NEGATIVE MG/DL
ANION GAP SERPL CALCULATED.3IONS-SCNC: 9 MMOL/L (ref 7–15)
APPEARANCE UR: CLEAR
ATRIAL RATE - MUSE: 82 BPM
BILIRUB UR QL STRIP: NEGATIVE
BUN SERPL-MCNC: 11.4 MG/DL (ref 8–23)
CALCIUM SERPL-MCNC: 9.3 MG/DL (ref 8.8–10.2)
CHLORIDE SERPL-SCNC: 104 MMOL/L (ref 98–107)
COLOR UR AUTO: ABNORMAL
CREAT SERPL-MCNC: 0.83 MG/DL (ref 0.51–0.95)
DEPRECATED HCO3 PLAS-SCNC: 26 MMOL/L (ref 22–29)
DIASTOLIC BLOOD PRESSURE - MUSE: NORMAL MMHG
EGFRCR SERPLBLD CKD-EPI 2021: 72 ML/MIN/1.73M2
ERYTHROCYTE [DISTWIDTH] IN BLOOD BY AUTOMATED COUNT: 12.9 % (ref 10–15)
FLUAV RNA SPEC QL NAA+PROBE: NEGATIVE
FLUBV RNA RESP QL NAA+PROBE: NEGATIVE
GLUCOSE SERPL-MCNC: 100 MG/DL (ref 70–99)
GLUCOSE UR STRIP-MCNC: NEGATIVE MG/DL
HCT VFR BLD AUTO: 33.3 % (ref 35–47)
HGB BLD-MCNC: 11.1 G/DL (ref 11.7–15.7)
HGB UR QL STRIP: NEGATIVE
INTERPRETATION ECG - MUSE: NORMAL
KETONES UR STRIP-MCNC: NEGATIVE MG/DL
LEUKOCYTE ESTERASE UR QL STRIP: NEGATIVE
LEVETIRACETAM SERPL-MCNC: 28.5 ΜG/ML (ref 10–40)
MAGNESIUM SERPL-MCNC: 2.3 MG/DL (ref 1.7–2.3)
MCH RBC QN AUTO: 30.4 PG (ref 26.5–33)
MCHC RBC AUTO-ENTMCNC: 33.3 G/DL (ref 31.5–36.5)
MCV RBC AUTO: 91 FL (ref 78–100)
MUCOUS THREADS #/AREA URNS LPF: PRESENT /LPF
NITRATE UR QL: NEGATIVE
P AXIS - MUSE: 15 DEGREES
PH UR STRIP: 7 [PH] (ref 5–7)
PHOSPHATE SERPL-MCNC: 3.3 MG/DL (ref 2.5–4.5)
PLATELET # BLD AUTO: 196 10E3/UL (ref 150–450)
POTASSIUM SERPL-SCNC: 4.2 MMOL/L (ref 3.4–5.3)
PR INTERVAL - MUSE: 176 MS
QRS DURATION - MUSE: 82 MS
QT - MUSE: 416 MS
QTC - MUSE: 486 MS
R AXIS - MUSE: -16 DEGREES
RBC # BLD AUTO: 3.65 10E6/UL (ref 3.8–5.2)
RBC URINE: 1 /HPF
RSV RNA SPEC NAA+PROBE: NEGATIVE
SARS-COV-2 RNA RESP QL NAA+PROBE: NEGATIVE
SODIUM SERPL-SCNC: 139 MMOL/L (ref 135–145)
SP GR UR STRIP: 1.02 (ref 1–1.03)
SQUAMOUS EPITHELIAL: <1 /HPF
SYSTOLIC BLOOD PRESSURE - MUSE: NORMAL MMHG
T AXIS - MUSE: 6 DEGREES
TRANSITIONAL EPI: <1 /HPF
TROPONIN T SERPL HS-MCNC: 12 NG/L
UROBILINOGEN UR STRIP-MCNC: NORMAL MG/DL
VENTRICULAR RATE- MUSE: 82 BPM
WBC # BLD AUTO: 6.5 10E3/UL (ref 4–11)
WBC URINE: 0 /HPF

## 2023-10-07 PROCEDURE — 250N000011 HC RX IP 250 OP 636: Mod: JZ | Performed by: EMERGENCY MEDICINE

## 2023-10-07 PROCEDURE — 99223 1ST HOSP IP/OBS HIGH 75: CPT | Mod: GC | Performed by: INTERNAL MEDICINE

## 2023-10-07 PROCEDURE — 80048 BASIC METABOLIC PNL TOTAL CA: CPT | Performed by: STUDENT IN AN ORGANIZED HEALTH CARE EDUCATION/TRAINING PROGRAM

## 2023-10-07 PROCEDURE — 95711 VEEG 2-12 HR UNMONITORED: CPT

## 2023-10-07 PROCEDURE — 99222 1ST HOSP IP/OBS MODERATE 55: CPT | Mod: 25 | Performed by: PSYCHIATRY & NEUROLOGY

## 2023-10-07 PROCEDURE — 87637 SARSCOV2&INF A&B&RSV AMP PRB: CPT | Performed by: EMERGENCY MEDICINE

## 2023-10-07 PROCEDURE — 85027 COMPLETE CBC AUTOMATED: CPT | Performed by: STUDENT IN AN ORGANIZED HEALTH CARE EDUCATION/TRAINING PROGRAM

## 2023-10-07 PROCEDURE — 84100 ASSAY OF PHOSPHORUS: CPT | Performed by: STUDENT IN AN ORGANIZED HEALTH CARE EDUCATION/TRAINING PROGRAM

## 2023-10-07 PROCEDURE — 95718 EEG PHYS/QHP 2-12 HR W/VEEG: CPT | Performed by: PSYCHIATRY & NEUROLOGY

## 2023-10-07 PROCEDURE — 36415 COLL VENOUS BLD VENIPUNCTURE: CPT | Performed by: STUDENT IN AN ORGANIZED HEALTH CARE EDUCATION/TRAINING PROGRAM

## 2023-10-07 PROCEDURE — 71045 X-RAY EXAM CHEST 1 VIEW: CPT

## 2023-10-07 PROCEDURE — 81001 URINALYSIS AUTO W/SCOPE: CPT | Performed by: EMERGENCY MEDICINE

## 2023-10-07 PROCEDURE — 70553 MRI BRAIN STEM W/O & W/DYE: CPT | Mod: MG

## 2023-10-07 PROCEDURE — 83735 ASSAY OF MAGNESIUM: CPT | Performed by: STUDENT IN AN ORGANIZED HEALTH CARE EDUCATION/TRAINING PROGRAM

## 2023-10-07 PROCEDURE — 120N000002 HC R&B MED SURG/OB UMMC

## 2023-10-07 PROCEDURE — 250N000009 HC RX 250: Performed by: EMERGENCY MEDICINE

## 2023-10-07 PROCEDURE — 70450 CT HEAD/BRAIN W/O DYE: CPT | Mod: MA

## 2023-10-07 PROCEDURE — 250N000013 HC RX MED GY IP 250 OP 250 PS 637: Performed by: STUDENT IN AN ORGANIZED HEALTH CARE EDUCATION/TRAINING PROGRAM

## 2023-10-07 PROCEDURE — 258N000003 HC RX IP 258 OP 636: Performed by: STUDENT IN AN ORGANIZED HEALTH CARE EDUCATION/TRAINING PROGRAM

## 2023-10-07 PROCEDURE — 250N000011 HC RX IP 250 OP 636: Performed by: STUDENT IN AN ORGANIZED HEALTH CARE EDUCATION/TRAINING PROGRAM

## 2023-10-07 PROCEDURE — 99223 1ST HOSP IP/OBS HIGH 75: CPT | Performed by: STUDENT IN AN ORGANIZED HEALTH CARE EDUCATION/TRAINING PROGRAM

## 2023-10-07 PROCEDURE — 70553 MRI BRAIN STEM W/O & W/DYE: CPT | Mod: 26 | Performed by: RADIOLOGY

## 2023-10-07 PROCEDURE — 70496 CT ANGIOGRAPHY HEAD: CPT | Mod: MA

## 2023-10-07 PROCEDURE — 258N000003 HC RX IP 258 OP 636: Performed by: EMERGENCY MEDICINE

## 2023-10-07 PROCEDURE — C9254 INJECTION, LACOSAMIDE: HCPCS | Performed by: STUDENT IN AN ORGANIZED HEALTH CARE EDUCATION/TRAINING PROGRAM

## 2023-10-07 PROCEDURE — 99207 EEG VIDEO 2-12 HRS UNMONITORED: CPT | Performed by: PSYCHIATRY & NEUROLOGY

## 2023-10-07 PROCEDURE — 255N000002 HC RX 255 OP 636: Performed by: STUDENT IN AN ORGANIZED HEALTH CARE EDUCATION/TRAINING PROGRAM

## 2023-10-07 PROCEDURE — A9585 GADOBUTROL INJECTION: HCPCS | Performed by: STUDENT IN AN ORGANIZED HEALTH CARE EDUCATION/TRAINING PROGRAM

## 2023-10-07 PROCEDURE — 99207 PR NO CHARGE LOS: CPT | Performed by: STUDENT IN AN ORGANIZED HEALTH CARE EDUCATION/TRAINING PROGRAM

## 2023-10-07 PROCEDURE — 70498 CT ANGIOGRAPHY NECK: CPT | Mod: MA

## 2023-10-07 RX ORDER — PANTOPRAZOLE SODIUM 40 MG/1
40 TABLET, DELAYED RELEASE ORAL
Status: DISCONTINUED | OUTPATIENT
Start: 2023-10-07 | End: 2023-10-07

## 2023-10-07 RX ORDER — ONDANSETRON 4 MG/1
4 TABLET, FILM COATED ORAL EVERY 8 HOURS PRN
Status: DISCONTINUED | OUTPATIENT
Start: 2023-10-07 | End: 2023-11-28 | Stop reason: HOSPADM

## 2023-10-07 RX ORDER — ACETAMINOPHEN 500 MG
500 TABLET ORAL EVERY 4 HOURS PRN
Status: DISCONTINUED | OUTPATIENT
Start: 2023-10-07 | End: 2023-10-19

## 2023-10-07 RX ORDER — GADOBUTROL 604.72 MG/ML
0.1 INJECTION INTRAVENOUS ONCE
Status: COMPLETED | OUTPATIENT
Start: 2023-10-07 | End: 2023-10-07

## 2023-10-07 RX ORDER — SODIUM CHLORIDE, SODIUM LACTATE, POTASSIUM CHLORIDE, CALCIUM CHLORIDE 600; 310; 30; 20 MG/100ML; MG/100ML; MG/100ML; MG/100ML
INJECTION, SOLUTION INTRAVENOUS CONTINUOUS
Status: ACTIVE | OUTPATIENT
Start: 2023-10-07 | End: 2023-10-07

## 2023-10-07 RX ORDER — MULTIVITAMIN,THERAPEUTIC
1 TABLET ORAL DAILY
Status: DISCONTINUED | OUTPATIENT
Start: 2023-10-07 | End: 2023-11-28 | Stop reason: HOSPADM

## 2023-10-07 RX ORDER — IOPAMIDOL 755 MG/ML
100 INJECTION, SOLUTION INTRAVASCULAR ONCE
Status: COMPLETED | OUTPATIENT
Start: 2023-10-07 | End: 2023-10-07

## 2023-10-07 RX ORDER — LEVETIRACETAM 100 MG/ML
1250 SOLUTION ORAL 2 TIMES DAILY
Status: DISCONTINUED | OUTPATIENT
Start: 2023-10-07 | End: 2023-10-24

## 2023-10-07 RX ORDER — BISACODYL 10 MG
10 SUPPOSITORY, RECTAL RECTAL DAILY PRN
Status: DISCONTINUED | OUTPATIENT
Start: 2023-10-07 | End: 2023-11-28 | Stop reason: HOSPADM

## 2023-10-07 RX ORDER — AMLODIPINE BESYLATE 5 MG/1
5 TABLET ORAL DAILY
Status: DISCONTINUED | OUTPATIENT
Start: 2023-10-07 | End: 2023-11-28 | Stop reason: HOSPADM

## 2023-10-07 RX ORDER — SODIUM CHLORIDE, SODIUM LACTATE, POTASSIUM CHLORIDE, CALCIUM CHLORIDE 600; 310; 30; 20 MG/100ML; MG/100ML; MG/100ML; MG/100ML
INJECTION, SOLUTION INTRAVENOUS CONTINUOUS
Status: DISCONTINUED | OUTPATIENT
Start: 2023-10-07 | End: 2023-10-07

## 2023-10-07 RX ORDER — AMOXICILLIN 250 MG
1 CAPSULE ORAL 2 TIMES DAILY PRN
Status: DISCONTINUED | OUTPATIENT
Start: 2023-10-07 | End: 2023-10-08

## 2023-10-07 RX ORDER — LORAZEPAM 2 MG/ML
2 INJECTION INTRAMUSCULAR
Status: DISCONTINUED | OUTPATIENT
Start: 2023-10-07 | End: 2023-11-28 | Stop reason: HOSPADM

## 2023-10-07 RX ORDER — ALBUTEROL SULFATE 90 UG/1
2 AEROSOL, METERED RESPIRATORY (INHALATION) EVERY 4 HOURS PRN
Status: DISCONTINUED | OUTPATIENT
Start: 2023-10-07 | End: 2023-10-26

## 2023-10-07 RX ORDER — FLUOXETINE 20 MG/5ML
40 SOLUTION ORAL DAILY
Status: DISCONTINUED | OUTPATIENT
Start: 2023-10-08 | End: 2023-10-24

## 2023-10-07 RX ORDER — BUSPIRONE HYDROCHLORIDE 10 MG/1
30 TABLET ORAL 2 TIMES DAILY
Status: DISCONTINUED | OUTPATIENT
Start: 2023-10-07 | End: 2023-11-28 | Stop reason: HOSPADM

## 2023-10-07 RX ORDER — AMOXICILLIN 250 MG
2 CAPSULE ORAL 2 TIMES DAILY PRN
Status: DISCONTINUED | OUTPATIENT
Start: 2023-10-07 | End: 2023-10-08

## 2023-10-07 RX ADMIN — SODIUM CHLORIDE, POTASSIUM CHLORIDE, SODIUM LACTATE AND CALCIUM CHLORIDE 75 ML/HR: 600; 310; 30; 20 INJECTION, SOLUTION INTRAVENOUS at 16:27

## 2023-10-07 RX ADMIN — IOPAMIDOL 67 ML: 755 INJECTION, SOLUTION INTRAVENOUS at 00:51

## 2023-10-07 RX ADMIN — AMLODIPINE BESYLATE 5 MG: 5 TABLET ORAL at 08:30

## 2023-10-07 RX ADMIN — SODIUM CHLORIDE 80 ML: 9 INJECTION, SOLUTION INTRAVENOUS at 00:52

## 2023-10-07 RX ADMIN — RIVAROXABAN 20 MG: 10 TABLET, FILM COATED ORAL at 19:21

## 2023-10-07 RX ADMIN — LEVETIRACETAM 1250 MG: 100 INJECTION, SOLUTION INTRAVENOUS at 02:33

## 2023-10-07 RX ADMIN — BUSPIRONE HYDROCHLORIDE 30 MG: 30 TABLET ORAL at 08:29

## 2023-10-07 RX ADMIN — BUSPIRONE HYDROCHLORIDE 30 MG: 30 TABLET ORAL at 20:33

## 2023-10-07 RX ADMIN — THERA TABS 1 TABLET: TAB at 08:30

## 2023-10-07 RX ADMIN — Medication 40 MG: at 19:22

## 2023-10-07 RX ADMIN — LEVETIRACETAM 1250 MG: 750 TABLET, FILM COATED ORAL at 08:34

## 2023-10-07 RX ADMIN — LEVETIRACETAM 1250 MG: 100 SOLUTION ORAL at 20:33

## 2023-10-07 RX ADMIN — FLUOXETINE 40 MG: 20 CAPSULE ORAL at 08:29

## 2023-10-07 RX ADMIN — LACOSAMIDE 200 MG: 10 INJECTION INTRAVENOUS at 14:09

## 2023-10-07 RX ADMIN — GADOBUTROL 7.5 ML: 604.72 INJECTION INTRAVENOUS at 21:39

## 2023-10-07 RX ADMIN — PANTOPRAZOLE SODIUM 40 MG: 40 TABLET, DELAYED RELEASE ORAL at 08:30

## 2023-10-07 RX ADMIN — LACOSAMIDE 100 MG: 10 INJECTION INTRAVENOUS at 20:33

## 2023-10-07 ASSESSMENT — ACTIVITIES OF DAILY LIVING (ADL)
EQUIPMENT_CURRENTLY_USED_AT_HOME: LIFT DEVICE;WHEELCHAIR, MANUAL
ADLS_ACUITY_SCORE: 49
DRESSING/BATHING_DIFFICULTY: YES
ADLS_ACUITY_SCORE: 49
ADLS_ACUITY_SCORE: 35
DIFFICULTY_EATING/SWALLOWING: YES
ADLS_ACUITY_SCORE: 49
ADLS_ACUITY_SCORE: 39
ADLS_ACUITY_SCORE: 49
TOILETING_MANAGEMENT: GRAB BARS
ADLS_ACUITY_SCORE: 39
ADLS_ACUITY_SCORE: 49
TOILETING_ISSUES: YES
TOILETING_ASSISTANCE: TOILETING DIFFICULTY, DEPENDENT
DOING_ERRANDS_INDEPENDENTLY_DIFFICULTY: YES
WALKING_OR_CLIMBING_STAIRS_DIFFICULTY: YES
CONCENTRATING,_REMEMBERING_OR_MAKING_DECISIONS_DIFFICULTY: YES
FALL_HISTORY_WITHIN_LAST_SIX_MONTHS: NO
ADLS_ACUITY_SCORE: 49
EATING/SWALLOWING: EATING
ADLS_ACUITY_SCORE: 49
WALKING_OR_CLIMBING_STAIRS: TRANSFERRING DIFFICULTY, DEPENDENT
ADLS_ACUITY_SCORE: 49
DRESSING/BATHING: BATHING DIFFICULTY, ASSISTANCE 1 PERSON
ADLS_ACUITY_SCORE: 49
CHANGE_IN_FUNCTIONAL_STATUS_SINCE_ONSET_OF_CURRENT_ILLNESS/INJURY: YES
WEAR_GLASSES_OR_BLIND: YES
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
## 2023-10-07 NOTE — CONSULTS
Assessment:  #Breakthrough focal seizures, likely related to missed Keppra and possible cancer recurrence  #Left frontoparietal glioblastoma multiforme, status post resection, radiation, and chemotherapy 2021, with recent imaging evidence of recurrence  #Dementia with aphasia, apraxia, and global cognitive decline  #Prior DVT on Eliquis with retroperitoneal bleed  #Prior suspected seizures (2021) previously on Keppra    Olga had at least 3 likely focal seizures involving 20-30 seconds of right face and neck twitching, behavioral arrest, and post-ictal language and swallowing disturbance, which all localizes well to her known left frontoparietal GBM. Her head CT, which I personally reviewed, is grossly the same as prior in February 2022 without evidence for edema, however a recent MRI (9/26/2023) demonstrated evidence of cancer recurrence with increased enhancement around the resection cavity and associated increased perfusion. Another possible contributor to focal seizures is that she missed a dose of Keppra last night at her residence.     She is not systemically ill, on no new medications, and has normal extended electrolytes with no sign of infection. She is not immunosuppressed and has not recently been on steroids. She does not drink any alcohol and has not had any changes/additions/subtractions to her med list, other than the missed 1 dose of Keppra.    Recommendations:  - Vimpat 200 mg IV load; maintenance 50 mg BID IV (when tolerating PO, can switch to same dose PO)  - Agree with swallow study, note that post-seizure Olga may have temporarily impaired swallowing  - Seizure precautions  - Continue PTA Keppra  - EEG to ensure control of possible subclinical or focal seizures (talked to  primary team and EEG to set up 3 hour EEG)  - Agree with MRI to determine other possible treatable causes of breakthrough seizures, can be done after EEG is performed          Exam:  Olga had an event of where she  was discussing her situation, stopped speaking, and had right lower face and right neck twitching that lasted for approximately 20-30 seconds. She had no recollection of the event afterwards, with slurred speech lasting 5-10 minutes and then return to baseline afterwards. At baseline, she is alert and able to tell me the state, her name, the Season, month, and that she is in the hospital. She does not know the city or hospital name. She has a mildly dysarthric speech with short sentences and use of superordinate words. Normal peripheral visual fields with no simultagnosia. Normal extraocular eye movements. No ptosis. No gross asymmetry in her appendicular movements, although restricted movements at the right shoulder which is reportedly chronic in the setting of neck surgeries. Brisk reflexes in the upper and lower limbs except for absent right knee and ankle jerk (prior knee surgery and foot drop on the right are chronic issues). No ataxia. Course left hand tremor at rest.

## 2023-10-07 NOTE — PROGRESS NOTES
Brief Medicine Progress Note:     See excellent H&P by Dr Bauer this AM for full plan.      In brief, pt with hx of glioblastoma s/p resection and chemo/rad and recent recurrence presented to ED with episodes of new/worsening aphasia and abnormal facial movements.      For her intermittent aphasia:   Stroke workup in the ED was negative for acute CVA, labs all reassuring, episodes appear most consistent with breakthrough partial seizures.  Discussed with neurology who recommended EEG (ordered) and addition of Vimpat load followed by BID dosing (ordered).  Oncology and Neurology both recommended MRI, which can be done tomorrow morning after EEG is complete.  No other medication changes at this time.  Lorazepam available for prolonged seizure lasting >5min or compromising airway.     For new dysphagia:   Dysphagia diet ordered.  SLP consult placed for swallow eval.  If unable to be carried out today, will start mIVF @75mL/hr.  Medications should be crushed with applesauce/pudding.     Elijah Sands MD  Internal Medicine and Pediatrics

## 2023-10-07 NOTE — PROGRESS NOTES
EEG CLINICAL NEUROPHYSIOLOGY PRELIMINARY REPORT    First 90 minutes of video EEG reviewed.  7 to 8 Hz posterior dominant rhythm.  More persistent higher amplitude is theta and polymorphic delta activity over the left hemisphere.  A focal impaired seizure with clinical features suggesting temporal lobe activation noted starting at 4:26 PM and lasting for about 2 minutes.  Lip smacking and picking movements of the left hand were noted.  Duration about 2 minutes.  EEG showed left hemispheric onset and evolution.    Abnormal.  Thus far, findings indicate  1) mild diffuse encephalopathy.  2) evidence of additional left hemispheric focal cerebral dysfunction, consistent with known structural abnormalities in this area.  3) single focal seizure; probably focal impaired.  Clinical features suggest temporal lobe activation.  Ictal EEG demonstrates left hemispheric, onset; parasaggital more than temporal.    Discussed with managing service.    Aldo Olmstead MD  Contact information for physicians covering Epilepsy and EEG is available on [facility].  Click search, enter neurology adult/[facility] in group name box, click on neurology adult/[facility], then click Staff Epilepsy and EEG.

## 2023-10-07 NOTE — ED NOTES
Bed: ED18  Expected date: 10/6/23  Expected time: 10:08 PM  Means of arrival:   Comments:  [id number]  78 F  Expressive aphasia

## 2023-10-07 NOTE — PROGRESS NOTES
6MS ADMISSION    D: [facility]     I: Upon arrival to the unit patient was oriented to room, unit, and call light. Patient s height, weight, and vital signs were obtained. Allergies reviewed and allergy band applied. Provider notified of patient s arrival on the unit. Adult AVS completed. Head to toe assessment completed. Education assessment completed. Care plan initiated.    A: Vital signs stable upon admission. Patient rates pain at 0/10. Two RN skin assessment completed Yes Second RN was Katerin No Significant Skin Findings include Ordered No. Bed Algorithm can be found in PCS flow sheets (Support Surface Algorithm) and on IP [facility] NURSE RESOURCE TAB, was this used during this assessment Yes. Was a pulsate mattress ordered ,No.    P: Continue to monitor patient s and intervene as needed. Continue with plan of care. Notify provider with any concerns or changes in patient status.

## 2023-10-07 NOTE — PLAN OF CARE
"0400-0700    Plan of Care Reviewed With: patient, spouse  Vitals: /79 (BP Location: Left arm, Patient Position: Supine, Cuff Size: Adult Regular)   Pulse 85   Temp 99.4  F (37.4  C) (Oral)   Resp 20   SpO2 94%   BMI= There is no height or weight on file to calculate BMI.     Patient is alert with intermittent confusion. Denies pain, has dry non-productive cough. Pt admitted due to aphasia ,right sided facial twitching and abnormal mouth movement.  No acute event during shift. Pt is an assist of 2 with lift device. On RA, SPO2>90%. Incont of Bowel and bladder, LBM-10/5, has a pure wick in place. PIV in R AC SL. Seizure precaution maintained. Call light is within reach. Will continue to follow POC.     Goal Outcome Evaluation    Overall Patient Progress: improving    Problem: Seizure Disorder Comorbidity  Goal: Maintenance of Seizure Control  Outcome: Progressing     Problem: Plan of Care - These are the overarching goals to be used throughout the patient stay.    Goal: Patient-Specific Goal (Individualized)  Description: You can add care plan individualizations to a care plan. Examples of Individualization might be:  \"Parent requests to be called daily at 9am for status\", \"I have a hard time hearing out of my right ear\", or \"Do not touch me to wake me up as it startles me\".  Outcome: Progressing   Goal Outcome Evaluation:      Plan of Care Reviewed With: patient, spouse    Overall Patient Progress: improving           "
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
## 2023-10-07 NOTE — ED TRIAGE NOTES
Patient arrives with aphasia.  Pt has a brain tumor and aphasia at baseline but  is concerned it is worse than normal. Pt requiring 1.5L O2 via nasal cannula upon arrival.    Jose Martin Moran RN on 10/6/2023 at 10:38 PM       Triage Assessment       Row Name 10/06/23 2233       Triage Assessment (Adult)    Airway WDL WDL       Respiratory WDL    Respiratory WDL X;all    Rhythm/Pattern, Respiratory shortness of breath       Skin Circulation/Temperature WDL    Skin Circulation/Temperature WDL WDL       Cardiac WDL    Cardiac WDL WDL       Peripheral/Neurovascular WDL    Peripheral Neurovascular WDL WDL       Cognitive/Neuro/Behavioral WDL    Cognitive/Neuro/Behavioral WDL X    Speech word-finding difficulty    Mood/Behavior calm       Ruth Coma Scale    Best Eye Response 4-->(E4) spontaneous    Best Motor Response 6-->(M6) obeys commands    Best Verbal Response 5-->(V5) oriented    Ruth Coma Scale Score 15

## 2023-10-07 NOTE — PROGRESS NOTES
VS:  /73 (BP Location: Left arm)   Pulse 80   Temp 98.8  F (37.1  C) (Oral)   Resp 20   SpO2 95%    O2:  Room Air    Output:  Pure Wick in place    Last BM:  Bowel sounds in all 4 quarters   Activity:  Mechanical Lift/ Assist 2 /    Up for meals?  Feeder  plus help with ordering    Skin:  No issues    Pain: Denies    CMS:  A&OX 2-3   Dressing:  None    Diet:  Level 4 Puree diet/ Dysphagia    LDA:  R PIV / Lacosamide infusing / Order for LR  after Lacsamide infusion   Equipment:  Personal Belongings    Plan:  Neuro / Onco / SLP consult & MRI    Additional Info: Patient experienced 3 mild seizures during shift.    MD and Daughter Nurse in room

## 2023-10-07 NOTE — ED NOTES
Emergency Department Patient Sign-out       Brief HPI:  This is a 78 year old female signed out to me by Dr. Kingston .  See initial ED Provider note for details of the presentation.            Significant Events prior to my assuming care: Patient with hx glioblastoma presenting with AMS, aphasia. Baseline has right sided hemiparesis. Getting CT/CTA head. Getting infectious workup. On eliquis and keppra. No seizures recently. Likely needs admission for AMS from baseline. Usually speaks normally but aphasia varies.. Has baseline dementia.      Exam:   Patient Vitals for the past 24 hrs:   BP Temp Temp src Pulse Resp SpO2   10/06/23 2234 (!) 161/102 98.1  F (36.7  C) Oral 81 24 96 %           ED RESULTS:   Results for orders placed or performed during the hospital encounter of 10/06/23 (from the past 24 hour(s))   CBC with platelets differential     Status: Abnormal    Collection Time: 10/06/23 11:29 PM    Narrative    The following orders were created for panel order CBC with platelets differential.  Procedure                               Abnormality         Status                     ---------                               -----------         ------                     CBC with platelets and d...[[id number]]  Abnormal            Final result                 Please view results for these tests on the individual orders.   INR     Status: Abnormal    Collection Time: 10/06/23 11:29 PM   Result Value Ref Range    INR 2.27 (H) 0.85 - 1.15   Partial thromboplastin time     Status: Abnormal    Collection Time: 10/06/23 11:29 PM   Result Value Ref Range    aPTT 43 (H) 22 - 38 Seconds   Basic metabolic panel     Status: Abnormal    Collection Time: 10/06/23 11:29 PM   Result Value Ref Range    Sodium 137 135 - 145 mmol/L    Potassium 4.0 3.4 - 5.3 mmol/L    Chloride 101 98 - 107 mmol/L    Carbon Dioxide (CO2) 25 22 - 29 mmol/L    Anion Gap 11 7 - 15 mmol/L    Urea Nitrogen 13.7 8.0 - 23.0 mg/dL    Creatinine 0.94 0.51 -  0.95 mg/dL    GFR Estimate 62 >60 mL/min/1.73m2    Calcium 9.1 8.8 - 10.2 mg/dL    Glucose 112 (H) 70 - 99 mg/dL   CBC with platelets and differential     Status: Abnormal    Collection Time: 10/06/23 11:29 PM   Result Value Ref Range    WBC Count 6.2 4.0 - 11.0 10e3/uL    RBC Count 3.64 (L) 3.80 - 5.20 10e6/uL    Hemoglobin 10.8 (L) 11.7 - 15.7 g/dL    Hematocrit 33.6 (L) 35.0 - 47.0 %    MCV 92 78 - 100 fL    MCH 29.7 26.5 - 33.0 pg    MCHC 32.1 31.5 - 36.5 g/dL    RDW 12.8 10.0 - 15.0 %    Platelet Count 186 150 - 450 10e3/uL    % Neutrophils 70 %    % Lymphocytes 11 %    % Monocytes 12 %    Mids % (Monos, Eos, Basos)      % Eosinophils 6 %    % Basophils 1 %    % Immature Granulocytes 0 %    NRBCs per 100 WBC 0 <1 /100    Absolute Neutrophils 4.4 1.6 - 8.3 10e3/uL    Absolute Lymphocytes 0.7 (L) 0.8 - 5.3 10e3/uL    Absolute Monocytes 0.7 0.0 - 1.3 10e3/uL    Mids Abs (Monos, Eos, Basos)      Absolute Eosinophils 0.4 0.0 - 0.7 10e3/uL    Absolute Basophils 0.0 0.0 - 0.2 10e3/uL    Absolute Immature Granulocytes 0.0 <=0.4 10e3/uL    Absolute NRBCs 0.0 10e3/uL   Troponin T, High Sensitivity     Status: Normal    Collection Time: 10/06/23 11:29 PM   Result Value Ref Range    Troponin T, High Sensitivity 12 <=14 ng/L   Glucose by meter     Status: Abnormal    Collection Time: 10/06/23 11:31 PM   Result Value Ref Range    GLUCOSE BY METER POCT 116 (H) 70 - 99 mg/dL       ED MEDICATIONS:   Medications   sodium chloride 0.9 % infusion ( Intravenous $New Bag 10/6/23 2567)   sodium chloride 0.9 % infusion (0 mLs  Hold 10/6/23 6116)         Impression:    ICD-10-CM    1. Altered mental status, unspecified altered mental status type  R41.82           Plan:    CT CTA without acute findings.  We will do a catheter sample for urine, this is common according to .  The patient will be given Keppra as she missed her evening dose.  Question subclinical seizures.  Labs otherwise do not show acute finding.  Due to change in  baseline will admit for likely oncology consult, possible neurology consult, possible MRI..        MD Alpesh Murrell, James Crystal MD  10/07/23 0626

## 2023-10-07 NOTE — PLAN OF CARE
Problem: Plan of Care - These are the overarching goals to be used throughout the patient stay.    Goal: Plan of Care Review  Description: The Plan of Care Review/Shift note should be completed every shift.  The Outcome Evaluation is a brief statement about your assessment that the patient is improving, declining, or no change.  This information will be displayed automatically on your shift note.  Outcome: Not Progressing  Flowsheets (Taken 10/7/2023 0919)  Outcome Evaluation: The patient has problems swallowing foods, liquids, and medication. Patient is coughing and choking when eating, drinking, and taking medication.  Plan of Care Reviewed With:   patient   family     Problem: Seizure Disorder Comorbidity  Goal: Maintenance of Seizure Control  Outcome: Not Progressing     Problem: Hypertension Comorbidity  Goal: Blood Pressure in Desired Range  Outcome: Not Progressing   Goal Outcome Evaluation:      Plan of Care Reviewed With: patient, family          Overall Evaluation

## 2023-10-08 ENCOUNTER — APPOINTMENT (OUTPATIENT)
Dept: OCCUPATIONAL THERAPY | Facility: CLINIC | Age: 78
DRG: 054 | End: 2023-10-08
Attending: STUDENT IN AN ORGANIZED HEALTH CARE EDUCATION/TRAINING PROGRAM
Payer: MEDICARE

## 2023-10-08 LAB
ANION GAP SERPL CALCULATED.3IONS-SCNC: 15 MMOL/L (ref 7–15)
BUN SERPL-MCNC: 9.1 MG/DL (ref 8–23)
CALCIUM SERPL-MCNC: 9 MG/DL (ref 8.8–10.2)
CHLORIDE SERPL-SCNC: 104 MMOL/L (ref 98–107)
CREAT SERPL-MCNC: 0.86 MG/DL (ref 0.51–0.95)
DEPRECATED HCO3 PLAS-SCNC: 22 MMOL/L (ref 22–29)
EGFRCR SERPLBLD CKD-EPI 2021: 69 ML/MIN/1.73M2
GLUCOSE SERPL-MCNC: 96 MG/DL (ref 70–99)
HOLD SPECIMEN: NORMAL
MAGNESIUM SERPL-MCNC: 1.9 MG/DL (ref 1.7–2.3)
PHOSPHATE SERPL-MCNC: 3.7 MG/DL (ref 2.5–4.5)
POTASSIUM SERPL-SCNC: 4.1 MMOL/L (ref 3.4–5.3)
SODIUM SERPL-SCNC: 141 MMOL/L (ref 135–145)

## 2023-10-08 PROCEDURE — 250N000013 HC RX MED GY IP 250 OP 250 PS 637: Performed by: STUDENT IN AN ORGANIZED HEALTH CARE EDUCATION/TRAINING PROGRAM

## 2023-10-08 PROCEDURE — 84100 ASSAY OF PHOSPHORUS: CPT | Performed by: STUDENT IN AN ORGANIZED HEALTH CARE EDUCATION/TRAINING PROGRAM

## 2023-10-08 PROCEDURE — 36415 COLL VENOUS BLD VENIPUNCTURE: CPT | Performed by: STUDENT IN AN ORGANIZED HEALTH CARE EDUCATION/TRAINING PROGRAM

## 2023-10-08 PROCEDURE — 120N000002 HC R&B MED SURG/OB UMMC

## 2023-10-08 PROCEDURE — C9254 INJECTION, LACOSAMIDE: HCPCS | Performed by: STUDENT IN AN ORGANIZED HEALTH CARE EDUCATION/TRAINING PROGRAM

## 2023-10-08 PROCEDURE — 83735 ASSAY OF MAGNESIUM: CPT | Performed by: STUDENT IN AN ORGANIZED HEALTH CARE EDUCATION/TRAINING PROGRAM

## 2023-10-08 PROCEDURE — 258N000003 HC RX IP 258 OP 636: Performed by: STUDENT IN AN ORGANIZED HEALTH CARE EDUCATION/TRAINING PROGRAM

## 2023-10-08 PROCEDURE — 80048 BASIC METABOLIC PNL TOTAL CA: CPT | Performed by: STUDENT IN AN ORGANIZED HEALTH CARE EDUCATION/TRAINING PROGRAM

## 2023-10-08 PROCEDURE — 97535 SELF CARE MNGMENT TRAINING: CPT | Mod: GO

## 2023-10-08 PROCEDURE — 99233 SBSQ HOSP IP/OBS HIGH 50: CPT | Performed by: STUDENT IN AN ORGANIZED HEALTH CARE EDUCATION/TRAINING PROGRAM

## 2023-10-08 PROCEDURE — 97166 OT EVAL MOD COMPLEX 45 MIN: CPT | Mod: GO

## 2023-10-08 PROCEDURE — 99232 SBSQ HOSP IP/OBS MODERATE 35: CPT | Performed by: PSYCHIATRY & NEUROLOGY

## 2023-10-08 PROCEDURE — 250N000011 HC RX IP 250 OP 636: Mod: JZ | Performed by: STUDENT IN AN ORGANIZED HEALTH CARE EDUCATION/TRAINING PROGRAM

## 2023-10-08 RX ORDER — AMOXICILLIN 250 MG
1 CAPSULE ORAL 2 TIMES DAILY
Status: DISCONTINUED | OUTPATIENT
Start: 2023-10-08 | End: 2023-10-30

## 2023-10-08 RX ORDER — DEXAMETHASONE SODIUM PHOSPHATE 10 MG/ML
4 INJECTION, SOLUTION INTRAMUSCULAR; INTRAVENOUS 2 TIMES DAILY
Status: DISCONTINUED | OUTPATIENT
Start: 2023-10-08 | End: 2023-10-16

## 2023-10-08 RX ORDER — SULFAMETHOXAZOLE/TRIMETHOPRIM 800-160 MG
1 TABLET ORAL DAILY
Status: DISCONTINUED | OUTPATIENT
Start: 2023-10-08 | End: 2023-11-27

## 2023-10-08 RX ADMIN — LACOSAMIDE 100 MG: 10 INJECTION INTRAVENOUS at 08:23

## 2023-10-08 RX ADMIN — DEXAMETHASONE SODIUM PHOSPHATE 4 MG: 10 INJECTION, SOLUTION INTRAMUSCULAR; INTRAVENOUS at 20:06

## 2023-10-08 RX ADMIN — DEXAMETHASONE SODIUM PHOSPHATE 4 MG: 10 INJECTION, SOLUTION INTRAMUSCULAR; INTRAVENOUS at 08:22

## 2023-10-08 RX ADMIN — FLUOXETINE HYDROCHLORIDE 40 MG: 20 SOLUTION ORAL at 08:23

## 2023-10-08 RX ADMIN — AMLODIPINE BESYLATE 5 MG: 5 TABLET ORAL at 08:22

## 2023-10-08 RX ADMIN — SENNOSIDES AND DOCUSATE SODIUM 1 TABLET: 50; 8.6 TABLET ORAL at 20:06

## 2023-10-08 RX ADMIN — THERA TABS 1 TABLET: TAB at 08:21

## 2023-10-08 RX ADMIN — LEVETIRACETAM 1250 MG: 100 SOLUTION ORAL at 08:22

## 2023-10-08 RX ADMIN — SENNOSIDES AND DOCUSATE SODIUM 1 TABLET: 50; 8.6 TABLET ORAL at 15:02

## 2023-10-08 RX ADMIN — LEVETIRACETAM 1250 MG: 100 SOLUTION ORAL at 20:06

## 2023-10-08 RX ADMIN — BUSPIRONE HYDROCHLORIDE 30 MG: 30 TABLET ORAL at 20:06

## 2023-10-08 RX ADMIN — RIVAROXABAN 20 MG: 10 TABLET, FILM COATED ORAL at 16:26

## 2023-10-08 RX ADMIN — ACETAMINOPHEN 500 MG: 325 TABLET, FILM COATED ORAL at 16:49

## 2023-10-08 RX ADMIN — Medication 40 MG: at 08:23

## 2023-10-08 RX ADMIN — LACOSAMIDE 100 MG: 10 INJECTION INTRAVENOUS at 20:07

## 2023-10-08 RX ADMIN — Medication 40 MG: at 16:27

## 2023-10-08 RX ADMIN — SULFAMETHOXAZOLE AND TRIMETHOPRIM 1 TABLET: 800; 160 TABLET ORAL at 08:21

## 2023-10-08 RX ADMIN — BUSPIRONE HYDROCHLORIDE 30 MG: 30 TABLET ORAL at 08:22

## 2023-10-08 ASSESSMENT — ACTIVITIES OF DAILY LIVING (ADL)
ADLS_ACUITY_SCORE: 49

## 2023-10-08 NOTE — PLAN OF CARE
VS:  37.7 97 20 129/77   O2: SpO2 > 93 and stable on RA. LS clear and equal bilaterally.    Output: purewick   Last BM: Unable to recall    Activity: A x 2   Skin: Edema on bilateral lower extremities    Pain: Not in pain; no other non verbal cues of pain   CMS: Aox3  REPETITIVE MOVEMENTS ON LEFT HAND   Dressing: none   Diet: Level 4   LDA: R PIV SL / infusing LR at 75 mL/hr.    Equipment: IV pole, personal belongings, monitor   Plan: standard precautions maintained / Continue with plan of care. Call light within reach, pt able to make needs known.   Plan: lacosamide IV    Additional Info:  Seizure precautions  MRI and EEG done  1620 suctioned oral secretions   2100 suctioned oral secretions

## 2023-10-08 NOTE — PROGRESS NOTES
10/08/23 0800   Appointment Info   Signing Clinician's Name / Credentials (OT) Kristie Rodarte, OTR/L   Rehab Comments (OT) aphasia at baseline; yes or no questions and head movements for answers   Living Environment   People in Home alone   Current Living Arrangements apartment   Living Environment Comments lives on main level of building with WIS   Self-Care   Usual Activity Tolerance moderate   Current Activity Tolerance fair   Equipment Currently Used at Home walker, rolling;wheelchair, manual;grab bar, tub/shower;grab bar, toilet  (4WW)   Fall history within last six months no   Activity/Exercise/Self-Care Comment ind with ADLs; A from neighbor for IADLs including financial and medication management; pt reports SPT with 4WW at baseline; use of w/c around house   General Information   Onset of Illness/Injury or Date of Surgery 10/06/23   Referring Physician Dr. Bauer   Patient/Family Therapy Goal Statement (OT) to go home from hospital   Additional Occupational Profile Info/Pertinent History of Current Problem per chart, 78 year old female admitted on 10/6/2023. She has history of L frontoparietal glioblastoma s/p resection,chemotherapy and radiation with remission in 2021, recently found to have recurrence in September 2023, with baseline cognitive and functional impairment, h/o seizures, h/o DVT on xarelto, HTN, and depression who presented 10/6/23 with acute worsening of baseline aphasia as well as new facial twitching. Admitted to internal medicine for further work up and management.   Existing Precautions/Restrictions fall;seizures   Limitations/Impairments aphasia   Left Upper Extremity (Weight-bearing Status) full weight-bearing (FWB)   Right Upper Extremity (Weight-bearing Status) full weight-bearing (FWB)   Left Lower Extremity (Weight-bearing Status) full weight-bearing (FWB)   Right Lower Extremity (Weight-bearing Status) full weight-bearing (FWB)   General Observations and Info pt does able to  answer yes or no questions with head movements and increased time for processing   Cognitive Status Examination   Orientation Status person;place;time;other (see comments)   Affect/Mental Status (Cognitive) WFL   Follows Commands increased processing time needed;repetition of directions required   Cognitive Status Comments pt knows year, not day of week   Visual Perception   Visual Impairment/Limitations corrective lenses full-time   Sensory   Sensory Quick Adds sensation intact   Pain Assessment   Patient Currently in Pain No   Posture   Posture forward head position   Range of Motion Comprehensive   General Range of Motion upper extremity range of motion deficits identified   Comment, General Range of Motion RUE limited shoulder/hand ROM   Strength Comprehensive (MMT)   Comment, General Manual Muscle Testing (MMT) Assessment generalized weakness   Muscle Tone Assessment   Muscle Tone Quick Adds No deficits were identified   Coordination   Upper Extremity Coordination Right UE impaired   Bed Mobility   Bed Mobility rolling left;rolling right;supine-sit   Rolling Left [place] (Bed Mobility) maximum assist (25% patient effort)   Rolling Right [place] (Bed Mobility) maximum assist (25% patient effort)   Supine-Sit [place] (Bed Mobility) maximum assist (25% patient effort)   Transfers   Transfers sit-stand transfer;bed-chair transfer   Transfer Skill: Bed to Chair/Chair to Bed   Bed-Chair [place] (Transfers) maximum assist (25% patient effort);2 person assist   Sit-Stand Transfer   Sit-Stand [place] (Transfers) maximum assist (25% patient effort);2 person assist   Balance   Balance Assessment sitting balance: dynamic;sitting balance: static   Activities of Daily Living   BADL Assessment/Intervention grooming;lower body dressing;upper body dressing   Upper Body Dressing Assessment/Training   [place] Level (Upper Body Dressing) maximum assist (25% patient effort)   Lower Body Dressing  Assessment/Training   [place] Level (Lower Body Dressing) dependent (less than 25% patient effort)   Grooming Assessment/Training   [place] Level (Grooming) minimum assist (75% patient effort)   Clinical Impression   Criteria for Skilled Therapeutic Interventions Met (OT) Yes, treatment indicated   OT Diagnosis decreased ADL independence   Influenced by the following impairments AMS, aphasia   OT Problem List-Impairments impacting ADL problems related to;activity tolerance impaired;range of motion (ROM);strength;other (see comments)  (aphasia)   Assessment of Occupational Performance 3-5 Performance Deficits   Identified Performance Deficits dressing, toileting, bed mob, func mob   Planned Therapy Interventions (OT) ADL retraining;cognition   Clinical Decision Making Complexity (OT) moderate complexity   Anticipated Equipment Needs Upon Discharge (OT) shower chair   Risk & Benefits of therapy have been explained evaluation/treatment results reviewed;patient   OT Total Evaluation Time   OT Eval, Moderate Complexity Minutes (14324) 10   OT Goals   Therapy Frequency (OT) 5 times/wk   OT Predicted Duration/Target Date for Goal Attainment 10/29/23   OT Goals Hygiene/Grooming;Upper Body Dressing;Lower Body Dressing;Bed Mobility;Transfers;Toilet Transfer/Toileting   OT: Hygiene/Grooming supervision/stand-by assist   OT: Upper Body Dressing Supervision/stand-by assist   OT: Lower Body Dressing Supervision/stand-by assist   OT: Bed Mobility Supervision/stand-by assist   OT: Transfer Supervision/stand-by assist   OT: Toilet Transfer/Toileting Supervision/stand-by assist   Interventions   Interventions Quick Adds Self-Care/Home Management   Self-Care/Home Management   Self-Care/Home Mgmt/ADL, Compensatory, Meal Prep Minutes (26282) 45   Symptoms Noted During/After Treatment (Meal Preparation/Planning Training) fatigue   Treatment Detail/Skilled Intervention Pt supine in bed upon OT arrival and agreeable to  therapy.Educ on role of OT. Increased time and effort t/o session d/t decreased communication d/t aphasia. Established communication with yes/no questions, head nods and increased processing time needed. Pt Max A for rolling R/L in supine for bed mob. Pt Max A for supine to sit. Pt Min-Max A to maintain static unsupported sitting EOB. cueing for hand and feet placement to support sitting position. Pt Min A for sitting EOB once hands and feet in proper position for ~10 minutes. Pt dependent for LB dressing to don brief while supine and don socks while sitting EOB. Pt Max Ax2 for STS from EOB with FWW. Pt attempted SPT, unable to move BLE. Pt Max Ax2 for STS with Sera Steady for bed to chair transfer. Pt required Max A to place RUE on Sera Steady. Pt Max Ax2 to scoot hips back in chair with draw sheet. Pt Min A to complete seated g/h in chair to wash face. Pt Max A for UB dressing to doff/don gown while seated in chair. v/c for initiation, sequencing, positioning, repetition t/o sesion. Pt left in chair with RN present and all needs within reach.   OT Discharge Planning   OT Plan bed mob, seated EOB balance, seated g/h in chair, transfers with Sera Steady   OT Discharge Recommendation (DC Rec) Transitional Care Facility   OT Rationale for DC Rec Pt is significantly below ADL baseline and would benefit from continued skille OT and TCU stay to increase safety, ADL independence and activity tolerance. D/t communication limitations at this, difficult to assess information provided by pt using yes/no questions.   OT Brief overview of current status Max Ax2 for bed to chair transfer using Sera Steady   Total Session Time   Timed Code Treatment Minutes 45   Total Session Time (sum of timed and untimed services) 55

## 2023-10-08 NOTE — PLAN OF CARE
Goal Outcome Evaluation:    Shift: 7598-6564    VS: /84 (BP Location: Right arm)   Pulse 80   Temp 98.4  F (36.9  C) (Oral)   Resp 17   SpO2 95%   Q4 vitals  Pain: Denies pain  Neuro: A&Ox1-2, able to use softtouch call light. Q4 neuro checks  Resp: Stable on room air. No SOB or chest pain. Productive cough observed  Diet: Level 4 pureed diet with extremely thickened liquids. Strict I&O.  Skin: No new deficits noted. Gen edema 1+ in her lower exts.   LDA: L PIV saline locked  Activity: Assist of 2 with logan steady and gaitbelt  Output: Voids via pure wick. LBM unknown. Senna added to scheduled meds today.   Additional info: Neuro consult done today  Call light within reach     Plan:  Plan of care ongoing

## 2023-10-08 NOTE — PROGRESS NOTES
[id number]    Plan of Care Reviewed With: patient  Pt A/O to person, with intermittent confusion. Denies pain/SOB. On RA, SPO2-94%, LS clear and equal bilaterally. Up with assist, and require assistant with feeding. Incont of B&B , LBM 10/5, has a Pure wick. PIV in R AC SL. No seizures episodes noted during shift. Call light is within reach. Will continue to follow POC.   Goal Outcome Evaluation:    Overall Patient Progress: improving  Problem: Plan of Care - These are the overarching goals to be used throughout the patient stay.    Goal: Plan of Care Review  Description: The Plan of Care Review/Shift note should be completed every shift.  The Outcome Evaluation is a brief statement about your assessment that the patient is improving, declining, or no change.  This information will be displayed automatically on your shift note.  Outcome: Progressing  Flowsheets (Taken 10/8/2023 0015)  Plan of Care Reviewed With: patient  Overall Patient Progress: improving     Problem: Seizure Disorder Comorbidity  Goal: Maintenance of Seizure Control  Outcome: Progressing     Problem: Hypertension Comorbidity  Goal: Blood Pressure in Desired Range  Outcome: Progressing

## 2023-10-08 NOTE — PROGRESS NOTES
Updates:  Olga had 1 electrographic focal seizure, and 1 witnessed likely seizure described as behavioral arrest and lip smacking. This was since yesterday mid afternoon, and after she was loaded with IV Vimpat and started on 100 mg BID of Vimpat.    She was started on IV dexamethasone 4 mg daily.      Exam:  Today she is awake but slow to respond to basic questions. She follows basic commands and can state her name, that her daughter and ex- are present (although she also states her son is present), and states it is September. She is not sure of the year or the city she is in, and exhibits some perseverative behavior. She moves all four limbs symmetrically but does not briskly follow commands with repetition and encouragement, and thus a detailed strength exam is not possible. Peripheral visual fields are normal with no simultagnosia. Extraocular movements are normal.    Assessment/Recs:  #Focal seizures, 2 since starting second AED  #Glioblastoma with likely recurrence  #Underlying dementia (with parkinsonism and L>R resting hand tremor)    I explained to her daughter (an RN) and ex-, who is an ER physician, that complete seizure control is likely not possible without adding further AEDs, which would carry significant sedating side effects and lead to decreased level of consciousness and impact quality of life.If further focal seizures happen, it is reasonable to increase Vimpat to 150 mg BID. When converting to oral, the primary team can use the same dose (1:1 IV to oral). If a generalized tonic-clonic seizure occurs, this would rebalance the risk/benefits of adding another AED.     They had excellent questions about future options for RT, specifically whether occasional focal seizure would contradindicate further RT, or whether RT would predispose her to worsening seizure frequency. I am not an expert in this area and will defer to her RT team and neuro-oncologist.     I otherwise do not have  any further recommendations to add at this time, other than seizure precautions on discharge, which in her case include avoiding baths deep enough to drown, if she swims, use a keke system. Wear a life jacket with any water activitiespad sharp corners, use non-slip carpet, avoid scatter rugs, and put barriers in front of fireplaces or hot stoves. Sit far back from open flames, such as a campfire.    Please let the neurology team know if further questions arise or unexpected changes to her exam occurs, we will follow peripherally.
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
## 2023-10-08 NOTE — PROGRESS NOTES
10/08/23 0800   Appointment Info   Signing Clinician's Name / Credentials (OT) Kristie Rodarte, OTR/L   Rehab Comments (OT) aphasia at baseline; yes or no questions and head movements for answers   Living Environment   People in Home alone   Current Living Arrangements apartment   Living Environment Comments lives on main level of building with WIS   Self-Care   Usual Activity Tolerance moderate   Current Activity Tolerance fair   Equipment Currently Used at Home walker, rolling;wheelchair, manual;grab bar, tub/shower;grab bar, toilet  (4WW)   Fall history within last six months no   Activity/Exercise/Self-Care Comment ind with ADLs; A from neighbor for IADLs including financial and medication management; pt reports SPT with 4WW at baseline; use of w/c around house   General Information   Onset of Illness/Injury or Date of Surgery 10/06/23   Referring Physician Dr. Bauer   Patient/Family Therapy Goal Statement (OT) to go home from hospital   Additional Occupational Profile Info/Pertinent History of Current Problem per chart, 78 year old female admitted on 10/6/2023. She has history of L frontoparietal glioblastoma s/p resection,chemotherapy and radiation with remission in 2021, recently found to have recurrence in September 2023, with baseline cognitive and functional impairment, h/o seizures, h/o DVT on xarelto, HTN, and depression who presented 10/6/23 with acute worsening of baseline aphasia as well as new facial twitching. Admitted to internal medicine for further work up and management.   Existing Precautions/Restrictions fall;seizures   Limitations/Impairments aphasia   Left Upper Extremity (Weight-bearing Status) full weight-bearing (FWB)   Right Upper Extremity (Weight-bearing Status) full weight-bearing (FWB)   Left Lower Extremity (Weight-bearing Status) full weight-bearing (FWB)   Right Lower Extremity (Weight-bearing Status) full weight-bearing (FWB)   General Observations and Info pt does able to  answer yes or no questions with head movements and increased time for processing. Per chart review - Ms. Bailey has been living in a nursing facility since May, 2022. She had been admitted to a different care facility for 9 months prior to that. She previously lived at home. She receives assistance with her basic daily activities. The nursing home is responsible for her medications and her meal preparation. Her  has taken over their finances and he is now her power of  as well. She has not driven for 2 years.   Cognitive Status Examination   Orientation Status person;place;time;other (see comments)   Affect/Mental Status (Cognitive) WFL   Follows Commands increased processing time needed;repetition of directions required   Cognitive Status Comments pt knows year, not day of week   Visual Perception   Visual Impairment/Limitations corrective lenses full-time   Sensory   Sensory Quick Adds sensation intact   Pain Assessment   Patient Currently in Pain No   Posture   Posture forward head position   Range of Motion Comprehensive   General Range of Motion upper extremity range of motion deficits identified   Comment, General Range of Motion RUE limited shoulder/hand ROM   Strength Comprehensive (MMT)   Comment, General Manual Muscle Testing (MMT) Assessment generalized weakness   Muscle Tone Assessment   Muscle Tone Quick Adds No deficits were identified   Coordination   Upper Extremity Coordination Right UE impaired   Bed Mobility   Bed Mobility rolling left;rolling right;supine-sit   Rolling Left [place] (Bed Mobility) maximum assist (25% patient effort)   Rolling Right [place] (Bed Mobility) maximum assist (25% patient effort)   Supine-Sit [place] (Bed Mobility) maximum assist (25% patient effort)   Transfers   Transfers sit-stand transfer;bed-chair transfer   Transfer Skill: Bed to Chair/Chair to Bed   Bed-Chair [place] (Transfers) maximum assist (25% patient effort);2 person assist    Sit-Stand Transfer   Sit-Stand [place] (Transfers) maximum assist (25% patient effort);2 person assist   Balance   Balance Assessment sitting balance: dynamic;sitting balance: static   Activities of Daily Living   BADL Assessment/Intervention grooming;lower body dressing;upper body dressing   Upper Body Dressing Assessment/Training   [place] Level (Upper Body Dressing) maximum assist (25% patient effort)   Lower Body Dressing Assessment/Training   [place] Level (Lower Body Dressing) dependent (less than 25% patient effort)   Grooming Assessment/Training   [place] Level (Grooming) minimum assist (75% patient effort)   Clinical Impression   Criteria for Skilled Therapeutic Interventions Met (OT) Yes, treatment indicated   OT Diagnosis decreased ADL independence   Influenced by the following impairments AMS, aphasia   OT Problem List-Impairments impacting ADL problems related to;activity tolerance impaired;range of motion (ROM);strength;other (see comments)  (aphasia)   Assessment of Occupational Performance 3-5 Performance Deficits   Identified Performance Deficits dressing, toileting, bed mob, func mob   Planned Therapy Interventions (OT) ADL retraining;cognition   Clinical Decision Making Complexity (OT) moderate complexity   Anticipated Equipment Needs Upon Discharge (OT) shower chair   Risk & Benefits of therapy have been explained evaluation/treatment results reviewed;patient   OT Total Evaluation Time   OT Eval, Moderate Complexity Minutes (29561) 10   OT Goals   Therapy Frequency (OT) 5 times/wk   OT Predicted Duration/Target Date for Goal Attainment 10/29/23   OT Goals Hygiene/Grooming;Upper Body Dressing;Lower Body Dressing;Bed Mobility;Transfers;Toilet Transfer/Toileting   OT: Hygiene/Grooming supervision/stand-by assist   OT: Upper Body Dressing Supervision/stand-by assist   OT: Lower Body Dressing Supervision/stand-by assist   OT: Bed Mobility Supervision/stand-by assist   OT:  Transfer Supervision/stand-by assist   OT: Toilet Transfer/Toileting Supervision/stand-by assist   Interventions   Interventions Quick Adds Self-Care/Home Management   Self-Care/Home Management   Self-Care/Home Mgmt/ADL, Compensatory, Meal Prep Minutes (37051) 45   Symptoms Noted During/After Treatment (Meal Preparation/Planning Training) fatigue   Treatment Detail/Skilled Intervention Pt supine in bed upon OT arrival and agreeable to therapy.Educ on role of OT. Increased time and effort t/o session d/t decreased communication d/t aphasia. Established communication with yes/no questions, head nods and increased processing time needed. Pt Max A for rolling R/L in supine for bed mob. Pt Max A for supine to sit. Pt Min-Max A to maintain static unsupported sitting EOB. cueing for hand and feet placement to support sitting position. Pt Min A for sitting EOB once hands and feet in proper position for ~10 minutes. Pt dependent for LB dressing to don brief while supine and don socks while sitting EOB. Pt Max Ax2 for STS from EOB with FWW. Pt attempted SPT, unable to move BLE. Pt Max Ax2 for STS with Sera Steady for bed to chair transfer. Pt required Max A to place RUE on Sera Steady. Pt Max Ax2 to scoot hips back in chair with draw sheet. Pt Min A to complete seated g/h in chair to wash face. Pt Max A for UB dressing to doff/don gown while seated in chair. v/c for initiation, sequencing, positioning, repetition t/o sesion. Pt left in chair with RN present and all needs within reach.   OT Discharge Planning   OT Plan bed mob, seated EOB balance, seated g/h in chair, transfers with Sera Steady   OT Discharge Recommendation (DC Rec) Transitional Care Facility   OT Rationale for DC Rec Pt is significantly below ADL baseline and would benefit from continued skille OT and TCU stay to increase safety, ADL independence and activity tolerance. D/t communication limitations at this, difficult to assess information provided by pt  using yes/no questions.   OT Brief overview of current status Max Ax2 for bed to chair transfer using Sera Steady   Total Session Time   Timed Code Treatment Minutes 45   Total Session Time (sum of timed and untimed services) 55

## 2023-10-08 NOTE — PROGRESS NOTES
[facility]    Medicine Progress Note - Hospitalist Service, GOLD TEAM 21    Date of Admission:  10/6/2023    Assessment & Plan      Olga Bailey is a 78 year old female admitted on 10/6/2023. She has history of L frontoparietal glioblastoma s/p resection,chemotherapy and radiation with remission in 2021, recently found to have recurrence in September 2023, with baseline cognitive and functional impairment, h/o seizures, h/o DVT on xarelto, HTN, and depression who presented 10/6/23 with acute worsening of baseline aphasia as well as new facial twitching. Admitted to internal medicine for further work up and management.     Partial Seizure: previously controlled  Acute worsening of chronic aphasia   Presented with acute (< 1 day) worsening of aphasia and new facial twitching with abnormal mouth movements concerning for partial seizure.  She underwent stroke eval in the ED which was reassuring against acute CVA,   Given recent recurrence of glioblastoma, there was concern for spread or advance of disease, though both CT and MRI were reassuring to stability of current malignancy from last imaging (9/2023).  No metabolic derangements to explain acute change, and norm WBC with no focal findings was reassuring against acute infection.  Neurology consulted and felt movements were consistent with partial seizure, lacosamide was added, though subsequent EEG showed some degree of persistent seizure activity, and further brief clinical seizures were observed.  Neurologist discussed risk/benefit of a possible third antiepileptic medication (which could carry a risk of heavier sedation), the family elected to remain on the two current medications for quality of life reasons.    - Neurology consulted, appreciate thoughtful input  - Continue Keppra 1250 mg BID   - Lacosamide 200mg loading dose (complete) then 100mg BID   - May transition to PO prior to discharge, same dose   -  Could increase to 150mg BID if increased seizure burden  - May reconsider third agent should seizure burden change drastically (ie Grand Mal, etc)   - Ativan PRN for any seizure activity > 2 minutes or increased clusters of seizure like activity   - Seizure precautions   - Neuro checks     H/o L frontoparietal glioblastoma s/p resection, chemotherapy and radiation (completed May 2021), with recurrence of malignancy 2023   Cognitive and functional impairment due to glioblastoma and chemoradiation   - Oncology consulted, appreciate input  - Started dexamethasone 4mg BID for vasogenic edema (limited on MRI)    - TMP-SMX daily for PCP PPX (pt had hx of PJP)   - PT/OT/SLP consults     Depression   - Continue PTA Buspirone and Fluoxetine     H/o DVT   - Continue PTA Xarelto     HTN   - Continue PTA Amlodipine        Diet: Pureed Diet (level 4) Extremely Thick (level 4)    DVT Prophylaxis: DOAC  Martino Catheter: Not present  Lines: None     Cardiac Monitoring: None  Code Status: Full Code      Clinically Significant Risk Factors               # Coagulation Defect: INR = 2.27 (Ref range: 0.85 - 1.15) and/or PTT = 43 Seconds (Ref range: 22 - 38 Seconds), will monitor for bleeding    # Hypertension: Noted on problem list                   Disposition Plan     Expected Discharge Date: 10/09/2023                  Elijah Sands MD  Hospitalist Service, GOLD TEAM [address] [place] [address]  Securely message with [employer] (more info)  Text page via [employer] Paging/Directory   See signed in provider for up to date coverage information  ______________________________________________________________________    Interval History   No acute events overnight.  Patient was able to sleep well.  Currently tolerating p.o. with puréed diet and thickened liquids, but requiring assistance and slow meal/feeding.  Multiple additional seizures noted overnight, consistent with prior episodes with facial twitching and  reduced response, very short, self resolving.  Patient denies any pain or discomfort.  No nausea or vomiting.  Denies chest pain, tightness, lightheadedness or dizziness, vision changes, or swelling in hands and feet.    Physical Exam   Vital Signs: Temp: 98.4  F (36.9  C) Temp src: Oral BP: 134/84 Pulse: 80   Resp: 17 SpO2: 95 % O2 Device: None (Room air)    Weight: 0 lbs 0 oz    General: Awake, alert, interactive, sitting up in bed. Intermittently interactive  Eyes: Sclera anicteric. No conjunctival injection. PERRLA.   Mouth: Oropharynx benign, no tonsillar exudate. MMM.   CV: Normal rate and rhythm, normal S1 and S2. No murmurs, rubs, gallops. Radial pulses 2+ and symmetric.  Respiratory: Lungs clear to auscultation bilaterally. No wheezing, rhonchi, or rales.  Abdomen: Soft, non-distended. Non-tender to palpation. No rebound tenderness or guarding. +BS.   Extremities: mild pitting edema of the feet and lower legs   Skin: Warm, dry. No rash on visible skin.   Neuro: Alert. Chronic facial asymmetry due to previous surgical resection. Answers questions appropriately with several word sentences, but slow speech. Intermittent lip brief movements during exam with some tongue smacking. B/l wrist contractures and coarse hand tremors L>R. Moves all extremities spontaneously.        Medical Decision Making       50 MINUTES SPENT BY ME on the date of service doing chart review, history, exam, documentation & further activities per the note.      Data     I have personally reviewed the following data over the past 24 hrs:    N/A  \   N/A   / N/A     141 104 9.1 /  96   4.1 22 0.86 \       Imaging results reviewed over the past 24 hrs:   Recent Results (from the past 24 hour(s))   MR Brain w/o & w Contrast    Narrative    EXAM: MR BRAIN W/O & W CONTRAST  10/7/2023 9:38 PM     HISTORY:  Hx of glioblastoma, admitted with seizures       COMPARISON:  CT same day and MRI 9/26/2023    TECHNIQUE: Sagittal and axial T1-weighted,  axial diffusion,  multiplanar T2 FLAIR with fat saturation images were obtained without  intravenous contrast. Following intravenous gadolinium-based contrast  administration, axial T2-weighted, axial susceptibility, and  T1-weighted images (in multiple planes) were obtained.    CONTRAST: 7.5mL Gadavist given via IV.    FINDINGS:  Stable postsurgical changes of left frontoparietal mass resection with  marginal resection cavity parenchymal T2 hyperintense signal. Stable  overlying dural thickening and enhancement.     Solid nodular enhancement posterior superior to the resection cavity  is unchanged in size measuring 1.5 x 2.1 x 1.1 cm (series 22 image 135  and series 21 image 114) since previous MRI. Unchanged nodular  enhancement anterior to the cavity measuring 0.5 x 0.8 x 1.1 cm  (series 22 image 114 and series 21 image 108). Stable smaller areas of  enhancement along the perisylvian frontal operculum. (series 21 image  98, 96).  Stable susceptible artifact adjacent to the cavity  consistent with hemosiderin.    Unchanged thin extra-axial fluid collection subjacent to the  craniotomy.    There is no mass effect, midline shift, or intracranial hemorrhage.  The ventricles are proportionate to the cerebral sulci. No acute  infarct on diffusion-weighted imaging.. Major intracranial vascular  structures are within normal limits. Confluent T2 white matter  hyperintensities along the periventricular white matter consistent  with chronic small vessel ischemic disease and/or prior whole brain  radiation. Stable 1.1 x 0.7 x 1.2 cm dural based mass overlying the  right occipital lobe which presumably represents a small meningioma.    Intraocular orbital contents grossly unremarkable. The paranasal  sinuses and mastoid air cells are clear.      Impression    IMPRESSION:  1. Postsurgical changes of left frontoparietal mass resections with  unchanged nodular enhancing foci along the resection cavity since  9/2023. Stable  smaller foci of enhancement along the perisylvian left  frontal operculum.  2. Otherwise, no new or acute intracranial pathology. No acute  infarct.  3. Periventricular and subcortical white matter T2 hyperintensity  representing chronic small vessel ischemic disease and/or prior  radiation changes.  4. Stable likely meningioma overlying the right occipital lobe.    I have personally reviewed the examination and initial interpretation  and I agree with the findings.    ALVIN HENRY MD         SYSTEM ID:  [id number]

## 2023-10-09 ENCOUNTER — APPOINTMENT (OUTPATIENT)
Dept: SPEECH THERAPY | Facility: CLINIC | Age: 78
DRG: 054 | End: 2023-10-09
Attending: STUDENT IN AN ORGANIZED HEALTH CARE EDUCATION/TRAINING PROGRAM
Payer: MEDICARE

## 2023-10-09 ENCOUNTER — APPOINTMENT (OUTPATIENT)
Dept: OCCUPATIONAL THERAPY | Facility: CLINIC | Age: 78
DRG: 054 | End: 2023-10-09
Attending: STUDENT IN AN ORGANIZED HEALTH CARE EDUCATION/TRAINING PROGRAM
Payer: MEDICARE

## 2023-10-09 LAB
ANION GAP SERPL CALCULATED.3IONS-SCNC: 14 MMOL/L (ref 7–15)
BUN SERPL-MCNC: 21.2 MG/DL (ref 8–23)
CALCIUM SERPL-MCNC: 9.6 MG/DL (ref 8.8–10.2)
CHLORIDE SERPL-SCNC: 103 MMOL/L (ref 98–107)
CREAT SERPL-MCNC: 0.81 MG/DL (ref 0.51–0.95)
DEPRECATED HCO3 PLAS-SCNC: 23 MMOL/L (ref 22–29)
EGFRCR SERPLBLD CKD-EPI 2021: 74 ML/MIN/1.73M2
GLUCOSE SERPL-MCNC: 126 MG/DL (ref 70–99)
HOLD SPECIMEN: NORMAL
MAGNESIUM SERPL-MCNC: 1.9 MG/DL (ref 1.7–2.3)
PHOSPHATE SERPL-MCNC: 3.9 MG/DL (ref 2.5–4.5)
POTASSIUM SERPL-SCNC: 4 MMOL/L (ref 3.4–5.3)
SODIUM SERPL-SCNC: 140 MMOL/L (ref 135–145)

## 2023-10-09 PROCEDURE — 92610 EVALUATE SWALLOWING FUNCTION: CPT | Mod: GN

## 2023-10-09 PROCEDURE — 92526 ORAL FUNCTION THERAPY: CPT | Mod: GN

## 2023-10-09 PROCEDURE — 250N000011 HC RX IP 250 OP 636: Mod: JZ | Performed by: STUDENT IN AN ORGANIZED HEALTH CARE EDUCATION/TRAINING PROGRAM

## 2023-10-09 PROCEDURE — 94640 AIRWAY INHALATION TREATMENT: CPT

## 2023-10-09 PROCEDURE — 36415 COLL VENOUS BLD VENIPUNCTURE: CPT | Performed by: STUDENT IN AN ORGANIZED HEALTH CARE EDUCATION/TRAINING PROGRAM

## 2023-10-09 PROCEDURE — 120N000002 HC R&B MED SURG/OB UMMC

## 2023-10-09 PROCEDURE — 258N000003 HC RX IP 258 OP 636: Performed by: STUDENT IN AN ORGANIZED HEALTH CARE EDUCATION/TRAINING PROGRAM

## 2023-10-09 PROCEDURE — 250N000013 HC RX MED GY IP 250 OP 250 PS 637: Performed by: STUDENT IN AN ORGANIZED HEALTH CARE EDUCATION/TRAINING PROGRAM

## 2023-10-09 PROCEDURE — 84100 ASSAY OF PHOSPHORUS: CPT | Performed by: STUDENT IN AN ORGANIZED HEALTH CARE EDUCATION/TRAINING PROGRAM

## 2023-10-09 PROCEDURE — 250N000009 HC RX 250

## 2023-10-09 PROCEDURE — 99207 PR NO CHARGE LOS: CPT | Performed by: STUDENT IN AN ORGANIZED HEALTH CARE EDUCATION/TRAINING PROGRAM

## 2023-10-09 PROCEDURE — 83735 ASSAY OF MAGNESIUM: CPT | Performed by: STUDENT IN AN ORGANIZED HEALTH CARE EDUCATION/TRAINING PROGRAM

## 2023-10-09 PROCEDURE — C9254 INJECTION, LACOSAMIDE: HCPCS | Performed by: STUDENT IN AN ORGANIZED HEALTH CARE EDUCATION/TRAINING PROGRAM

## 2023-10-09 PROCEDURE — 97535 SELF CARE MNGMENT TRAINING: CPT | Mod: GO | Performed by: OCCUPATIONAL THERAPIST

## 2023-10-09 PROCEDURE — 999N000157 HC STATISTIC RCP TIME EA 10 MIN

## 2023-10-09 PROCEDURE — 99232 SBSQ HOSP IP/OBS MODERATE 35: CPT | Performed by: STUDENT IN AN ORGANIZED HEALTH CARE EDUCATION/TRAINING PROGRAM

## 2023-10-09 PROCEDURE — 80048 BASIC METABOLIC PNL TOTAL CA: CPT | Performed by: STUDENT IN AN ORGANIZED HEALTH CARE EDUCATION/TRAINING PROGRAM

## 2023-10-09 RX ORDER — IPRATROPIUM BROMIDE AND ALBUTEROL SULFATE 2.5; .5 MG/3ML; MG/3ML
SOLUTION RESPIRATORY (INHALATION)
Status: COMPLETED
Start: 2023-10-09 | End: 2023-10-09

## 2023-10-09 RX ORDER — IPRATROPIUM BROMIDE AND ALBUTEROL SULFATE 2.5; .5 MG/3ML; MG/3ML
3 SOLUTION RESPIRATORY (INHALATION) ONCE
Status: COMPLETED | OUTPATIENT
Start: 2023-10-09 | End: 2023-10-09

## 2023-10-09 RX ORDER — SODIUM CHLORIDE, SODIUM LACTATE, POTASSIUM CHLORIDE, CALCIUM CHLORIDE 600; 310; 30; 20 MG/100ML; MG/100ML; MG/100ML; MG/100ML
INJECTION, SOLUTION INTRAVENOUS CONTINUOUS
Status: DISCONTINUED | OUTPATIENT
Start: 2023-10-09 | End: 2023-10-22

## 2023-10-09 RX ORDER — IPRATROPIUM BROMIDE AND ALBUTEROL SULFATE 2.5; .5 MG/3ML; MG/3ML
3 SOLUTION RESPIRATORY (INHALATION) EVERY 6 HOURS PRN
Status: DISCONTINUED | OUTPATIENT
Start: 2023-10-09 | End: 2023-10-14

## 2023-10-09 RX ADMIN — BUSPIRONE HYDROCHLORIDE 30 MG: 30 TABLET ORAL at 09:19

## 2023-10-09 RX ADMIN — THERA TABS 1 TABLET: TAB at 09:19

## 2023-10-09 RX ADMIN — DEXAMETHASONE SODIUM PHOSPHATE 4 MG: 10 INJECTION, SOLUTION INTRAMUSCULAR; INTRAVENOUS at 20:07

## 2023-10-09 RX ADMIN — LACOSAMIDE 100 MG: 10 INJECTION INTRAVENOUS at 21:32

## 2023-10-09 RX ADMIN — SULFAMETHOXAZOLE AND TRIMETHOPRIM 1 TABLET: 800; 160 TABLET ORAL at 09:19

## 2023-10-09 RX ADMIN — LACOSAMIDE 100 MG: 10 INJECTION INTRAVENOUS at 10:01

## 2023-10-09 RX ADMIN — SENNOSIDES AND DOCUSATE SODIUM 1 TABLET: 50; 8.6 TABLET ORAL at 20:06

## 2023-10-09 RX ADMIN — LEVETIRACETAM 1250 MG: 100 SOLUTION ORAL at 20:07

## 2023-10-09 RX ADMIN — LEVETIRACETAM 1250 MG: 100 INJECTION, SOLUTION INTRAVENOUS at 11:11

## 2023-10-09 RX ADMIN — AMLODIPINE BESYLATE 5 MG: 5 TABLET ORAL at 09:19

## 2023-10-09 RX ADMIN — FLUOXETINE HYDROCHLORIDE 40 MG: 20 SOLUTION ORAL at 09:19

## 2023-10-09 RX ADMIN — IPRATROPIUM BROMIDE AND ALBUTEROL SULFATE 3 ML: 2.5; .5 SOLUTION RESPIRATORY (INHALATION) at 10:08

## 2023-10-09 RX ADMIN — IPRATROPIUM BROMIDE AND ALBUTEROL SULFATE 3 ML: .5; 3 SOLUTION RESPIRATORY (INHALATION) at 10:08

## 2023-10-09 RX ADMIN — Medication 40 MG: at 18:11

## 2023-10-09 RX ADMIN — SODIUM CHLORIDE, POTASSIUM CHLORIDE, SODIUM LACTATE AND CALCIUM CHLORIDE: 600; 310; 30; 20 INJECTION, SOLUTION INTRAVENOUS at 12:28

## 2023-10-09 RX ADMIN — RIVAROXABAN 20 MG: 10 TABLET, FILM COATED ORAL at 18:09

## 2023-10-09 RX ADMIN — DEXAMETHASONE SODIUM PHOSPHATE 4 MG: 10 INJECTION, SOLUTION INTRAMUSCULAR; INTRAVENOUS at 09:18

## 2023-10-09 RX ADMIN — SENNOSIDES AND DOCUSATE SODIUM 1 TABLET: 50; 8.6 TABLET ORAL at 09:19

## 2023-10-09 RX ADMIN — BUSPIRONE HYDROCHLORIDE 30 MG: 30 TABLET ORAL at 20:07

## 2023-10-09 RX ADMIN — Medication 40 MG: at 09:19

## 2023-10-09 RX ADMIN — LEVETIRACETAM 1250 MG: 100 SOLUTION ORAL at 09:19

## 2023-10-09 ASSESSMENT — ACTIVITIES OF DAILY LIVING (ADL)
ADLS_ACUITY_SCORE: 51
DEPENDENT_IADLS:: CLEANING;COOKING;LAUNDRY;SHOPPING;MEAL PREPARATION;MEDICATION MANAGEMENT;MONEY MANAGEMENT;TRANSPORTATION
ADLS_ACUITY_SCORE: 51

## 2023-10-09 NOTE — PLAN OF CARE
Problem: Plan of Care - These are the overarching goals to be used throughout the patient stay.    Goal: Plan of Care Review  Description: The Plan of Care Review/Shift note should be completed every shift.  The Outcome Evaluation is a brief statement about your assessment that the patient is improving, declining, or no change.  This information will be displayed automatically on your shift note.  Outcome: Progressing  Flowsheets (Taken 10/9/2023 1414)  Outcome Evaluation: Patient to discharge back to [facility] with extra assistance versus TCU.  Plan of Care Reviewed With:   spouse   patient  Overall Patient Progress: improving     Problem: Seizure Disorder Comorbidity  Goal: Maintenance of Seizure Control  Outcome: Progressing     Problem: Hypertension Comorbidity  Goal: Blood Pressure in Desired Range  Outcome: Progressing

## 2023-10-09 NOTE — PROGRESS NOTES
[facility]    Medicine Progress Note - Hospitalist Service, GOLD TEAM 21    Date of Admission:  10/6/2023    Assessment & Plan      Olga Bailey is a 78 year old female admitted on 10/6/2023. She has history of L frontoparietal glioblastoma s/p resection,chemotherapy and radiation with remission in 2021, recently found to have recurrence in September 2023, with baseline cognitive and functional impairment, h/o seizures, h/o DVT on xarelto, HTN, and depression who presented 10/6/23 with acute worsening of baseline aphasia as well as new facial twitching. Admitted to internal medicine for further work up and management.     Partial Seizure: previously controlled  Worsened chronic aphasia   Difficulty Swallowing  Presented with acute (< 1 day) worsening of aphasia and new facial twitching with abnormal mouth movements concerning for partial seizure.  She underwent stroke eval in the ED which was reassuring against acute CVA,   Given recent recurrence of glioblastoma, there was concern for spread or advance of disease, though both CT and MRI were reassuring to stability of current malignancy from last imaging (9/2023).  No metabolic derangements to explain acute change, and norm WBC with no focal findings was reassuring against acute infection.  Neurology consulted and felt movements were consistent with partial seizure, lacosamide was added, though subsequent EEG showed some degree of persistent seizure activity, and further brief clinical seizures were observed.  Neurologist discussed risk/benefit of a possible third antiepileptic medication (which could carry a risk of heavier sedation), the family elected to remain on the two current medications for quality of life reasons.  - Neurology consulted, appreciate thoughtful input  - Continue Keppra 1250 mg BID   - Lacosamide 200mg loading dose (complete) then 100mg BID   - May transition to PO prior to discharge, same  dose   - Could increase to 150mg BID if increased seizure burden  - May reconsider third agent should seizure burden change drastically (ie Grand Mal, etc)   - Ativan PRN for any seizure activity > 2 minutes or increased clusters of seizure like activity   - Seizure precautions   - Neuro checks     H/o L frontoparietal glioblastoma s/p resection, chemotherapy and radiation (completed May 2021), with recurrence of malignancy 2023   Cognitive and functional impairment due to glioblastoma and chemoradiation   Seen by oncology on this visit, repeat imaging stable from September. Started on trial of steroid, will eval symptom improvement. Patient has developed worsening difficulty with swallowing, seems to have become an issue over the past few days, per patient (started prior to steroid).   - Oncology consulted, appreciate input  - Started dexamethasone 4mg BID  (10/9)   - Onc following, expect at least three days at current dose, then possible taper.    - TMP-SMX daily for PCP PPX (pt had hx of PJP)   - Rad/Onc consulted, appreciate thoughtful case review.   - PT/OT consulted, appreciate input.   - SLP consult pending, reordered with FEES to better assess swallow.     Likely aspiration pneumonitis  Coughing with medications, significant water suctioned.  Felt a little short of breath afterwords, which she felt improved with a duoneb.  On exam, she does have scattered bilateral wheezes present.   - DuoNeb QID PRN   - NPO pending speech eval    Depression   - Continue PTA Buspirone and Fluoxetine     H/o DVT   - Continue PTA Xarelto     HTN   - Continue PTA Amlodipine        Diet: NPO for Medical/Clinical Reasons Except for: No Exceptions    DVT Prophylaxis: DOAC  Martino Catheter: Not present  Lines: None     Cardiac Monitoring: None  Code Status: Full Code      Clinically Significant Risk Factors                  # Hypertension: Noted on problem list                   Disposition Plan     Expected Discharge Date:  10/09/2023                  Elijah Sands MD  Hospitalist Service, GOLD TEAM 21  [facility]  Securely message with [employer] (more info)  Text page via [employer] Paging/Directory   See signed in provider for up to date coverage information  ______________________________________________________________________    Interval History   No acute events overnight.  Patient was able to sleep well.  Was tolerating p.o. with puréed diet and thickened liquids, but had episode of aspiration with medications this AM, now NPO.  Seizures burden appears stable per nursing, very short, self resolving.  Patient denies any pain or discomfort.  No nausea or vomiting.  Denies chest pain, tightness, lightheadedness or dizziness, vision changes, or swelling in hands and feet.  She does feel that she's had a bit more shortness of breath at rest, which she relates to the times she feels like she has choked on food or beverage, slightly better after breathing treatment.     Physical Exam   Vital Signs: Temp: 97.6  F (36.4  C) Temp src: Oral BP: 107/67 Pulse: 84   Resp: 16 SpO2: 96 % O2 Device: None (Room air)    Weight: 0 lbs 0 oz    General: Awake, alert, interactive, sitting up in bed. Appropriately interactive  Eyes: Sclera anicteric. No conjunctival injection. PERRLA.   Mouth: Oropharynx benign, no tonsillar exudate. MMM.   CV: Normal rate and rhythm, normal S1 and S2. No murmurs, rubs, gallops. Radial pulses 2+ and symmetric.  Respiratory: Lungs clear to auscultation bilaterally. No wheezing, rhonchi, or rales.  Abdomen: Soft, non-distended. Non-tender to palpation. No rebound tenderness or guarding. +BS.   Extremities: mild pitting edema of the feet and lower legs   Skin: Warm, dry. No rash on visible skin.   Neuro: Alert. Chronic facial asymmetry due to previous surgical resection. Answers questions appropriately with several word sentences, but slow speech. Intermittent lip brief movements  during exam with some tongue smacking. B/l wrist contractures and coarse hand tremors L>R. Moves all extremities spontaneously.        Medical Decision Making       50 MINUTES SPENT BY ME on the date of service doing chart review, history, exam, documentation & further activities per the note.      Data     I have personally reviewed the following data over the past 24 hrs:    N/A  \   N/A   / N/A     140 103 21.2 /  126 (H)   4.0 23 0.81 \     Imaging results reviewed over the past 24 hrs:   No results found for this or any previous visit (from the past 24 hour(s)).

## 2023-10-09 NOTE — PROGRESS NOTES
Brief Medicine Addendum:     Patient seen by speech who cleared for a diet after evaluation this afternoon.  Case reviewed by Radiation Oncology, who would offer her treatment at [place] during this hospitalization.  Plan for appt with Dr Spann tomorrow (10/10) at 8:30am, followed by simulation at 9am.  She will likely undergo 10 treatments starting Thursday or Friday this week.     PT recommending TCU, pt likely to be med ready to discharge to TCU 10/11.  Due to ongoing treatments, will need to be discharged to [facility] on this campus, and will have daily transport to her radiation treatments.  Likely discharge back to her prior living arrangements after completing radiation, pending needs. Will make Oncology aware to coordinate avastin.     Elijah Sands MD  Internal Medicine and Pediatrics

## 2023-10-09 NOTE — PROGRESS NOTES
"Speech-Language Pathology: Clinical Swallow Evaluation     10/09/23 1400   Appointment Info   Signing Clinician's Name / Credentials (SLP) Rosetta Fonseca MA, CCC-SLP   General Information   Onset of Illness/Injury or Date of Surgery 10/06/23   Referring Physician Elijah Sands MD   Pertinent History of Current Problem   Per ordering provider progress note from earlier this PM, \"Olga Bailey is a 78 year old female admitted on 10/6/2023. She has history of L frontoparietal glioblastoma s/p resection,chemotherapy and radiation with remission in 2021, recently found to have recurrence in September 2023, with baseline cognitive and functional impairment, h/o seizures, h/o DVT on xarelto, HTN, and depression who presented 10/6/23 with acute worsening of baseline aphasia as well as new facial twitching. Admitted to internal medicine for further work up and management.\" Note also states, \"Presented with acute (< 1 day) worsening of aphasia and new facial twitching with abnormal mouth movements concerning for partial seizure. She underwent stroke eval in the ED which was reassuring against acute CVA, Given recent recurrence of glioblastoma, there was concern for spread or advance of disease, though both CT and MRI were reassuring to stability of current malignancy from last imaging (9/2023). No metabolic derangements to explain acute change, and norm WBC with no focal findings was reassuring against acute infection. Neurology consulted and felt movements were consistent with partial seizure, lacosamide was added, though subsequent EEG showed some degree of persistent seizure activity, and further brief clinical seizures were observed. Neurologist discussed risk/benefit of a possible third antiepileptic medication (which could carry a risk of heavier sedation), the family elected to remain on the two current medications for quality of life reasons.\"     General Observations Patient alert and cooperative. " She was slow to come up with words at times, and was occasionally unable to finish a thought/sentence. Her , Fahad, was present at beginning and end of session.   Type of Evaluation   Type of Evaluation Swallow Evaluation   Oral Motor   Oral Musculature anomalies present   Mucosal Quality adequate   Dentition (Oral Motor)   Dentition (Oral Motor) natural dentition;adequate dentition   Facial Symmetry (Oral Motor)   Facial Symmetry (Oral Motor) right side impairment   Comment, Facial Symmetry (Oral Motor) Mild right facial droop noted; particularly around mouth.   Right Side Facial Asymmetry minimal impairment   Lip Function (Oral Motor)   Lip Range of Motion (Oral Motor) protrusion impairment   Lip Strength (Oral Motor) WFL   Protrusion, Lip Range of Motion right side;minimal impairment   Tongue Function (Oral Motor)   Tongue Strength (Oral Motor) WNL   Tongue Coordination/Speed (Oral Motor) reduced rate   Tongue ROM (Oral Motor) WNL   Jaw Function (Oral Motor)   Jaw Function (Oral Motor) WNL   Cough/Swallow/Gag Reflex (Oral Motor)   Volitional Throat Clear/Cough (Oral Motor) reduced strength   Vocal Quality/Secretion Management (Oral Motor)   Vocal Quality (Oral Motor) WNL   Secretion Management (Oral Motor) WNL   Comment, Vocal Quality/Secretion Management (Oral Motor) Patient's vocal quality was clear. Mildly reduced vocal volume noted.   General Swallowing Observations   Past History of Dysphagia   Yes. PMHx significant for oropharyngeal dysphagia s/p glioblastoma resection in 3/2021. Patient has participated in multiple VFSSs since then. It appears that most recent VFSS was completed on 4/7/22. At that time, patient presented with deep laryngeal penetration and suspected trace aspiration of thin liquid. At time of discharge to TCU, recommended diet was pureed textures (Level 4) and moderately thick liquids (Level 3). This PM, SLP spoke with Alana, a caregiver from patient's assisted living facility. Alana  states that patient's diet is Regular textures and thin liquids. Suspect that patient received Speech Therapy services following hospitalization in 4/2022 and her diet was eventually advanced back to Regular/Thin. Alana reports that staff do cut patient's food up for her and often assist with feeding.     Comment, General Swallowing Observations   Per RN report, patient coughed with liquid medication given by spoon this AM. Per NA report, patient also coughed with breakfast this AM when spouse was feeding her. Spouse did not mention this today, but did state that patient has a tendency to cough during meals if she is fed too quickly.     Respiratory Support (General Swallowing Observations) none   Current Diet/Method of Nutritional Intake (General Swallowing Observations, NIS) NPO  (Until clinical swallow evaluation with Speech Pathology)   Swallowing Evaluation Clinical swallow evaluation   Clinical Swallow Evaluation   Feeding Assistance frequent cues/help required   Clinical Swallow Evaluation Textures Trialed thin liquids;pureed;solid foods   Clinical Swallow Eval: Thin Liquid Texture Trial   Mode of Presentation, Thin Liquids cup;straw   Volume of Liquid or Food Presented >4 ounces   Oral Phase of Swallow WFL   Pharyngeal Phase of Swallow intact  (No overt s/sx of aspiration)   Diagnostic Statement Patient independently took appropriate sized sips. Subjectively, swallow response appeared mildly delayed. No overt clinical s/sx of aspiration observed.   Clinical Swallow Evaluation: Puree Solid Texture Trial   Mode of Presentation, Puree spoon;fed by clinician   Volume of Puree Presented 6 bites   Oral Phase, Puree WFL   Pharyngeal Phase, Puree intact  (No overt s/sx of aspiration)   Diagnostic Statement Oropharyngeal swallow function appeared WNL with puree consistency. No overt clinical s/sx of aspiration observed. Patient denied globus sensation after swallows.   Clinical Swallow Evaluation: Solid Food Texture  Trial   Mode of Presentation self-fed   Volume Presented 4 bites   Oral Phase delayed AP movement;effortful AP movement;residue in oral cavity   Oral Residue other (see comments)  (right buccal cavity)   Pharyngeal Phase intact  (No overt s/sx of aspiration)   Diagnostic Statement Patient demonstrated effective though mildly prolonged mastication. A-P bolus transit was mildly delayed and effortful. Patient had mild oral residue in the right buccal cavity after swallows. She was sensate to this and able to clear it with additional sips of liquid. No overt clinical s/sx of aspiration observed.   Esophageal Phase of Swallow   Patient reports or presents with symptoms of esophageal dysphagia No   Swallowing Recommendations   Diet Consistency Recommendations soft & bite-sized (level 6);thin liquids (level 0)   Supervision Level for Intake 1:1 supervision needed  (Patient needs assist with tray set-up and feeding)   Mode of Delivery Recommendations bolus size, small;slow rate of intake   Monitoring/Assistance Required (Eating/Swallowing) stop eating activities when fatigue is present;monitor for cough or change in vocal quality with intake;check mouth frequently for oral residue/pocketing   Recommended Feeding/Eating Techniques (Swallow Eval) maintain upright sitting position for eating;set-up and prepare tray;provide assist with feeding;provide oral hygiene prior to intake   Medication Administration Recommendations, Swallowing (SLP) Continue to give pills mixed in applesauce or pudding.   Instrumental Assessment Recommendations instrumental evaluation not recommended at this time  (But may be indicated if new concerns re: swallow arise.)   General Therapy Interventions   Planned Therapy Interventions Dysphagia Treatment   Dysphagia treatment Modified diet education;Instruction of safe swallow strategies   Clinical Impression   Criteria for Skilled Therapeutic Interventions Met (SLP Eval) Yes, treatment indicated   SLP  "Diagnosis Oropharyngeal dysphagia   Risks & Benefits of therapy have been explained evaluation/treatment results reviewed;care plan/treatment goals reviewed;risks/benefits reviewed;participants voiced agreement with care plan;participants included;patient;spouse/significant other   Clinical Impression Comments   Clinical swallow evaluation completed per MD order. Patient presents with mild oral dysphagia characterized by mildly prolonged bolus prep and A-P bolus transit of a regular texture solid (reno cracker). Patient stated, \"Sometimes, it's hard for me to swallow dry things.\" She was unable to provide examples of items that give her difficulty. Patient has a known history of pharyngeal dysphagia dating back to 2021. While there were no overt clinical s/sx of aspiration during this evaluation, nursing reports at least 2 episodes of coughing with PO intake today.     At this time, recommend diet of Soft & Bite Sized textures (Level 6) and thin liquids with strict adherence to the safe swallowing/feeding strategies listed above. Speech Therapy will follow to closely monitor diet tolerance, reinforce strategies with caregivers, and trial advanced food textures as appropriate. Will also monitor for necessity of instrumental evaluation of swalow.     SLP Total Evaluation Time   Eval: oral/pharyngeal swallow function, clinical swallow Minutes (87184) 16   SLP Goals   Therapy Frequency (SLP Eval) 5 times/wk   SLP Predicted Duration/Target Date for Goal Attainment 10/16/23   SLP Goals Swallow   SLP: Safely tolerate diet without signs/symptoms of aspiration Easy to chew diet;Thin liquids;With use of swallow precautions;With assistance/supervision   Interventions   Interventions Quick Adds Swallowing Dysfunction   Swallowing Dysfunction &/or Oral Function for Feeding   Treatment of Swallowing Dysfunction &/or Oral Function for Feeding Minutes (09375) 8   Symptoms Noted During/After Treatment None   Treatment " Detail/Skilled Intervention Patient and spouse were educated on the oral and pharyngeal phases of the swallow, as well as rationale for modified diet textures. Additionally, they were instructed on the importance of adhering to safe swallowing/feeding precautions to minimize the risk of aspiration/choking. Patient and spouse verbalized understanding of the education provided. Patient denied having any questions. Spouse asked appropriate questions which were answered to his verbalized understanding and satisfaction.   SLP Discharge Planning   SLP Plan F/U re: diet tolerance. Reinforce swallow strategies w/ caregivers. Monitor for necessity of VFSS.   SLP Discharge Recommendation home with home care speech therapy   SLP Rationale for DC Rec Swallow function is impaired, but may be at/near new baseline.   SLP Brief overview of current status  Recommend diet of Soft & Bite Sized textures and thin liquids. Pt to be alert and sitting fully upright for all intake. Allow pt to feed herself as much as possible, but if/when assisting her, offer small bites and feed pt slowly (at her own pace). SLP will continue to follow for dysphagia management.   Total Session Time   Total Session Time (sum of timed and untimed services) 24

## 2023-10-09 NOTE — PLAN OF CARE
[id number]    Patient is alert to self and family. Call light within reach. Able to make needs known. Needs assistance x 1-2 with repositioning and transfer. Purewick in place, wears brief. LBM: 10/05.    RA. Pureed (Level 4), Extremely thick (Level 4) diet. PIV on L lower forearm, SL, intact and patent. Denies pain, SOB, chest pain, n/v and n/t. Baseline tremors on BUE. RUE weakness noted.     Seizure precaution maintained throughout the shift. Continue with POC.

## 2023-10-10 ENCOUNTER — OFFICE VISIT (OUTPATIENT)
Dept: RADIATION ONCOLOGY | Facility: CLINIC | Age: 78
End: 2023-10-10
Attending: STUDENT IN AN ORGANIZED HEALTH CARE EDUCATION/TRAINING PROGRAM
Payer: MEDICARE

## 2023-10-10 ENCOUNTER — APPOINTMENT (OUTPATIENT)
Dept: SPEECH THERAPY | Facility: CLINIC | Age: 78
DRG: 054 | End: 2023-10-10
Attending: STUDENT IN AN ORGANIZED HEALTH CARE EDUCATION/TRAINING PROGRAM
Payer: MEDICARE

## 2023-10-10 DIAGNOSIS — C71.9 GLIOBLASTOMA (H): Primary | ICD-10-CM

## 2023-10-10 DIAGNOSIS — C71.9 GLIOBLASTOMA MULTIFORME OF BRAIN (H): Primary | ICD-10-CM

## 2023-10-10 LAB
ANION GAP SERPL CALCULATED.3IONS-SCNC: 9 MMOL/L (ref 7–15)
BUN SERPL-MCNC: 21.9 MG/DL (ref 8–23)
CALCIUM SERPL-MCNC: 9.2 MG/DL (ref 8.8–10.2)
CHLORIDE SERPL-SCNC: 101 MMOL/L (ref 98–107)
CREAT SERPL-MCNC: 0.83 MG/DL (ref 0.51–0.95)
DEPRECATED HCO3 PLAS-SCNC: 26 MMOL/L (ref 22–29)
EGFRCR SERPLBLD CKD-EPI 2021: 72 ML/MIN/1.73M2
GLUCOSE SERPL-MCNC: 91 MG/DL (ref 70–99)
MAGNESIUM SERPL-MCNC: 1.8 MG/DL (ref 1.7–2.3)
PHOSPHATE SERPL-MCNC: 3 MG/DL (ref 2.5–4.5)
POTASSIUM SERPL-SCNC: 4 MMOL/L (ref 3.4–5.3)
SODIUM SERPL-SCNC: 136 MMOL/L (ref 135–145)

## 2023-10-10 PROCEDURE — 77263 THER RADIOLOGY TX PLNG CPLX: CPT | Performed by: STUDENT IN AN ORGANIZED HEALTH CARE EDUCATION/TRAINING PROGRAM

## 2023-10-10 PROCEDURE — 77470 SPECIAL RADIATION TREATMENT: CPT | Performed by: STUDENT IN AN ORGANIZED HEALTH CARE EDUCATION/TRAINING PROGRAM

## 2023-10-10 PROCEDURE — 99233 SBSQ HOSP IP/OBS HIGH 50: CPT | Mod: GC | Performed by: INTERNAL MEDICINE

## 2023-10-10 PROCEDURE — 92526 ORAL FUNCTION THERAPY: CPT | Mod: GN

## 2023-10-10 PROCEDURE — 99215 OFFICE O/P EST HI 40 MIN: CPT | Mod: 25 | Performed by: STUDENT IN AN ORGANIZED HEALTH CARE EDUCATION/TRAINING PROGRAM

## 2023-10-10 PROCEDURE — 77334 RADIATION TREATMENT AID(S): CPT | Performed by: STUDENT IN AN ORGANIZED HEALTH CARE EDUCATION/TRAINING PROGRAM

## 2023-10-10 PROCEDURE — 77470 SPECIAL RADIATION TREATMENT: CPT | Mod: 26 | Performed by: STUDENT IN AN ORGANIZED HEALTH CARE EDUCATION/TRAINING PROGRAM

## 2023-10-10 PROCEDURE — C9254 INJECTION, LACOSAMIDE: HCPCS | Performed by: STUDENT IN AN ORGANIZED HEALTH CARE EDUCATION/TRAINING PROGRAM

## 2023-10-10 PROCEDURE — 84100 ASSAY OF PHOSPHORUS: CPT | Performed by: STUDENT IN AN ORGANIZED HEALTH CARE EDUCATION/TRAINING PROGRAM

## 2023-10-10 PROCEDURE — 250N000011 HC RX IP 250 OP 636: Performed by: STUDENT IN AN ORGANIZED HEALTH CARE EDUCATION/TRAINING PROGRAM

## 2023-10-10 PROCEDURE — 120N000002 HC R&B MED SURG/OB UMMC

## 2023-10-10 PROCEDURE — 83735 ASSAY OF MAGNESIUM: CPT | Performed by: STUDENT IN AN ORGANIZED HEALTH CARE EDUCATION/TRAINING PROGRAM

## 2023-10-10 PROCEDURE — 36415 COLL VENOUS BLD VENIPUNCTURE: CPT | Performed by: STUDENT IN AN ORGANIZED HEALTH CARE EDUCATION/TRAINING PROGRAM

## 2023-10-10 PROCEDURE — 258N000003 HC RX IP 258 OP 636: Performed by: STUDENT IN AN ORGANIZED HEALTH CARE EDUCATION/TRAINING PROGRAM

## 2023-10-10 PROCEDURE — 250N000013 HC RX MED GY IP 250 OP 250 PS 637: Performed by: STUDENT IN AN ORGANIZED HEALTH CARE EDUCATION/TRAINING PROGRAM

## 2023-10-10 PROCEDURE — 77334 RADIATION TREATMENT AID(S): CPT | Mod: 26 | Performed by: STUDENT IN AN ORGANIZED HEALTH CARE EDUCATION/TRAINING PROGRAM

## 2023-10-10 PROCEDURE — 80048 BASIC METABOLIC PNL TOTAL CA: CPT | Performed by: STUDENT IN AN ORGANIZED HEALTH CARE EDUCATION/TRAINING PROGRAM

## 2023-10-10 RX ADMIN — SENNOSIDES AND DOCUSATE SODIUM 1 TABLET: 50; 8.6 TABLET ORAL at 11:36

## 2023-10-10 RX ADMIN — Medication 40 MG: at 11:39

## 2023-10-10 RX ADMIN — AMLODIPINE BESYLATE 5 MG: 5 TABLET ORAL at 11:36

## 2023-10-10 RX ADMIN — ONDANSETRON 4 MG: 4 TABLET, FILM COATED ORAL at 10:01

## 2023-10-10 RX ADMIN — SODIUM CHLORIDE, POTASSIUM CHLORIDE, SODIUM LACTATE AND CALCIUM CHLORIDE: 600; 310; 30; 20 INJECTION, SOLUTION INTRAVENOUS at 17:57

## 2023-10-10 RX ADMIN — THERA TABS 1 TABLET: TAB at 11:35

## 2023-10-10 RX ADMIN — SENNOSIDES AND DOCUSATE SODIUM 1 TABLET: 50; 8.6 TABLET ORAL at 19:34

## 2023-10-10 RX ADMIN — BUSPIRONE HYDROCHLORIDE 30 MG: 30 TABLET ORAL at 19:34

## 2023-10-10 RX ADMIN — FLUOXETINE HYDROCHLORIDE 40 MG: 20 SOLUTION ORAL at 11:35

## 2023-10-10 RX ADMIN — Medication 40 MG: at 17:28

## 2023-10-10 RX ADMIN — RIVAROXABAN 20 MG: 10 TABLET, FILM COATED ORAL at 17:27

## 2023-10-10 RX ADMIN — LEVETIRACETAM 1250 MG: 100 SOLUTION ORAL at 11:35

## 2023-10-10 RX ADMIN — DEXAMETHASONE SODIUM PHOSPHATE 4 MG: 10 INJECTION, SOLUTION INTRAMUSCULAR; INTRAVENOUS at 19:38

## 2023-10-10 RX ADMIN — DEXAMETHASONE SODIUM PHOSPHATE 4 MG: 10 INJECTION, SOLUTION INTRAMUSCULAR; INTRAVENOUS at 11:37

## 2023-10-10 RX ADMIN — SULFAMETHOXAZOLE AND TRIMETHOPRIM 1 TABLET: 800; 160 TABLET ORAL at 11:36

## 2023-10-10 RX ADMIN — BUSPIRONE HYDROCHLORIDE 30 MG: 30 TABLET ORAL at 11:35

## 2023-10-10 RX ADMIN — LACOSAMIDE 100 MG: 10 INJECTION INTRAVENOUS at 13:21

## 2023-10-10 RX ADMIN — LEVETIRACETAM 1250 MG: 100 SOLUTION ORAL at 19:57

## 2023-10-10 RX ADMIN — LACOSAMIDE 100 MG: 10 INJECTION INTRAVENOUS at 21:57

## 2023-10-10 ASSESSMENT — ACTIVITIES OF DAILY LIVING (ADL)
ADLS_ACUITY_SCORE: 55
ADLS_ACUITY_SCORE: 51
ADLS_ACUITY_SCORE: 51
ADLS_ACUITY_SCORE: 55
ADLS_ACUITY_SCORE: 51
ADLS_ACUITY_SCORE: 55
ADLS_ACUITY_SCORE: 51

## 2023-10-10 NOTE — PLAN OF CARE
Physical Therapy: Orders received. Chart reviewed and discussed with care team.? Physical Therapy not indicated due to patient presenting at baseline level of function (EZ stand).? Defer discharge recommendations to Occupational Therapy and medical team.? Will complete orders.

## 2023-10-10 NOTE — PROGRESS NOTES
Brief Inpatient Radiation Oncology Progress Note    Patient and spouse Fahad are seen at bedside this evening. Discussed possibility of initiating radiotherapy while at [facility] / possibly transferring to TCU level of care. She has been seen and simulated by CORY Hwang prior to this admission. Given current admission and disposition, preference is now towards initiating treatment here.     We offered Ms. Bailey the option of simulation appointment tomorrow or Wednesday with the possibility of starting therapy shortly thereafter. We are anticipating a hypofractionated course over 10 fractions / 2 weeks of treatment. I have requested nursing at [facility] coordinate transportation to [place] for follow up and simulation appointments with Dr. Betsy Spann, her primary Radiation Oncologist who directed her therapy during her initial adjuvant course of treatment, for tomorrow at 8:30 and 9 am, respectively.    If she is medically ready and disposition is to TCU, she will need to be discharged to [facility] for transport back and forth to radiation at [place], which has been communicated to her primary Medicine team, as well as to Social Work. Fahad stated to me that he will transport her to and from radiation appointments should she be able to return to [facility] during her radiation course.     Please page me directly with questions, or contact our clinic:   Romina Humphrey MD PGY5  [phone] Pager  [phone] Clinic

## 2023-10-10 NOTE — PROGRESS NOTES
[facility]    Medicine Progress Note - Hospitalist Service, GOLD TEAM 21    Date of Admission:  10/6/2023    Assessment & Plan   Olga Bailey is a 78 year old female admitted on 10/6/2023. She has history of L frontoparietal glioblastoma s/p resection,chemotherapy and radiation with remission in 2021, recently found to have recurrence in September 2023, with baseline cognitive and functional impairment, h/o seizures, h/o DVT on xarelto, HTN, and depression who presented 10/6/23 with acute worsening of baseline aphasia as well as new facial twitching. Admitted to internal medicine for further work up and management.     Updates  -went for simulation for RT today 10/10  -spoke with radiation oncology. Planning for 10 fractions of RT treatment starting on Thu 10/12 at 4:15pm  -spoke with SW. Ok to discharge to other facility if transportation can be arranged to all RT treatments  -reached out to oncology regarding steroid dosing and taper (currently dexamethasone 4mg bid for at least 3 days). Input much appreciated.     Partial Seizure: previously controlled  Worsened chronic aphasia   Difficulty Swallowing  Presented with acute (< 1 day) worsening of aphasia and new facial twitching with abnormal mouth movements concerning for partial seizure.  She underwent stroke eval in the ED which was reassuring against acute CVA,   Given recent recurrence of glioblastoma, there was concern for spread or advance of disease, though both CT and MRI were reassuring to stability of current malignancy from last imaging (9/2023).  No metabolic derangements to explain acute change, and norm WBC with no focal findings was reassuring against acute infection.  Neurology consulted and felt movements were consistent with partial seizure, lacosamide was added, though subsequent EEG showed some degree of persistent seizure activity, and further brief clinical seizures were observed.   Neurologist discussed risk/benefit of a possible third antiepileptic medication (which could carry a risk of heavier sedation), the family elected to remain on the two current medications for quality of life reasons.  - Neurology consulted, appreciate thoughtful input  - Continue Keppra 1250 mg BID   - Lacosamide 200mg loading dose (complete) then 100mg BID   - May transition to PO prior to discharge, same dose   - Could increase to 150mg BID if increased seizure burden  - May reconsider third agent should seizure burden change drastically (ie Grand Mal, etc)   - Ativan PRN for any seizure activity > 2 minutes or increased clusters of seizure like activity   - Seizure precautions   - Neuro checks     H/o L frontoparietal glioblastoma s/p resection, chemotherapy and radiation (completed May 2021), with recurrence of malignancy 2023   Cognitive and functional impairment due to glioblastoma and chemoradiation   Seen by oncology on this visit, repeat imaging stable from September. Started on trial of steroid, will eval symptom improvement. Patient has developed worsening difficulty with swallowing, seems to have become an issue over the past few days, per patient (started prior to steroid).   - Oncology consulted, appreciate input  - Started dexamethasone 4mg BID  (10/9)   - Onc following, expect at least three days at current dose, then possible taper.    - TMP-SMX daily for PCP PPX (pt had hx of PJP)   - Rad/Onc consulted, appreciate thoughtful case review.   - PT/OT consulted, appreciate input.      Likely aspiration pneumonitis  Coughing with medications, significant water suctioned.  Felt a little short of breath afterwords, which she felt improved with a duoneb.  On exam, she does have scattered bilateral wheezes present.   - DuoNeb QID PRN   - SLP consult completed, appreciate input. Soft diet level 6 with thin liquids.    Depression   - Continue PTA Buspirone and Fluoxetine     H/o DVT   - Continue PTA Xarelto      HTN   - Continue PTA Amlodipine        Diet: Soft & Bite Sized Diet (level 6) Thin Liquids (level 0)    DVT Prophylaxis: DOAC  Martino Catheter: Not present  Lines: None     Cardiac Monitoring: None  Code Status: Full Code      Clinically Significant Risk Factors                  # Hypertension: Noted on problem list                   Disposition Plan           Tommy Odom MD  Hospitalist Service, GOLD TEAM 21  [facility]  Securely message with [employer] (more info)  Text page via [employer] Paging/Directory   See signed in provider for up to date coverage information  ______________________________________________________________________    Interval History   No events      Physical Exam   Vital Signs: Temp: (!) 96.6  F (35.9  C) Temp src: Oral BP: 111/82 Pulse: 74   Resp: 16 SpO2: 97 % O2 Device: None (Room air)    Weight: 0 lbs 0 oz    General Appearance: Alert and interactive  Respiratory: clear  Cardiovascular: regular  GI: soft nt  Skin: wwp  Other: Slow speech, tongue smacking, moves all extremities spontaneously      Medical Decision Making       40 MINUTES SPENT BY ME on the date of service doing chart review, history, exam, documentation & further activities per the note.      Data

## 2023-10-10 NOTE — PROGRESS NOTES
AO per her baseline, Pt is on bed and able to make needs known. Pt  at the bedside to help with needs. Pt denies nausea, vomiting, chest pain, incontinent with bowel and bladder.  Ass 2 . LBM 10/10    Pt take med's crushed in apple sauce     Pt was up in the recliner for meal  during shift, Falls and seizures precautions maintained throughout shift.   LP IV infusing LR

## 2023-10-10 NOTE — LETTER
10/10/2023         RE: Olga Bailey  [address]  [place] [address]        Dear Colleague,    Thank you for referring your patient, Olga Bailey, to the [facility] RADIATION ONCOLOGY. Please see a copy of my visit note below.    Radiation Therapy Patient Education    Person involved with teaching: Patient and Fahad     Patient educational needs for self management of treatment-related side effects assessment completed.  EPIC Patient Ed tab contains Patient Learning Assessment    Education Materials Given  Simulation hand out     Educational Topics Discussed  When to call MD/RN    Response To Teaching  Verbalizes understanding    GYN Only  Vaginal Dilator-given and educated: N/A    Referrals sent: None    Chemotherapy?  Yes: Avastin with Dr. Baker, message sent to find out anticipated timeline.     Pt c/o nausea while waiting for transport back to [facility], given 4mg zofran per inpatient orders.     Pt and SO also wondering about ability to receive Covid booster and RSV vaccination, deferring to NeuroOncology with steroids and upcoming chemotherapy; okay from a radiation oncology prospective.       Again, thank you for allowing me to participate in the care of your patient.        Sincerely,        Betsy Spann MD PhD

## 2023-10-10 NOTE — LETTER
10/10/2023         RE: Olga Bailey  [address]  [facility] [address]        Dear Colleague,    Thank you for referring your patient, Olga Bailey, to the [facility] RADIATION ONCOLOGY. Please see a copy of my visit note below.       Department of Radiation Oncology  [facility]  [address] [place] [address]  [phone]       Radiation Oncology Virtual Follow-up Visit  October 10, 2023      Olga Bailey  MRN: [id number]   : 1945     DISEASE TREATED:   Glioblastoma, IDH1/IDH2 wild type, ATRX intact, p53 and PTEN mutant, BRAF  non-mutated, MGMT promoter methylated.                                   RADIATION THERAPY DELIVERED:   4,005 cGy in 15 fractions, from 2021 - 2021    SYSTEMIC THERAPY:  Plans to start adjuvant temozolomide, avastin    INTERVAL SINCE COMPLETION OF RADIATION THERAPY:   28 months    SUBJECTIVE:   Olga Bailey is a 78 year old female with known diagnosis of glioblastoma. She first presented with progressive aphasia in 2021, and underwent brain MRI with and without contrast on 2021, which revealed a 3.3 x 2.8 x 2.8 cm enhancing mass in left frontal-parietal region. The patient underwent left parietal craniotomy and computer-assisted stereotactic volumetric resection of the tumor using neuro-navigation with Dr. Cummings of Neurosurgery at [facility] on 2021. Surgical pathology, and subsequent glioma focused NGS panel together confirmed her diagnosis as outlined above.      She experienced prolonged hospitalization post-surgery for complications including DVT and pneumonia. In this interval, her performance status has declined with need for increased respiratory support. She was transferred to [facility] and proceeded with adjuvant radiotherapy alone using hypofractionation scheme as outlined above, while she was continued on close monitoring of her respiratory status. Given her performance  "status, radiation treatment alone was offered.     She was discharged to our transitional care unit on 5/15/2021 and she continued to receive radiation as she undergo rehabilitation. On 5/26/2021, she had a seizure episode, prompting emergent hospitalization to [facility] for workup. CT head yielded no acute findings or change. Neurology was consulted, and she was started on Keppra and Decadron tapering (2 mg BID for 1 wk, 2 mg daily for 1 wk). Otherwise, Ms. Bailey tolerated her treatment well, with development of fatigue as anticipated. On her last day of treatment, she reported vision consistent with \"dirty glasses\" that has occurred throughout the duration of her hospital stay and is stable. No other associated symptoms were reported.      She completed adjuvant temozolomide in 2021.    She has been doing well,with the exception of progression on imaging and admission for a focal seizure on 10/06, and  is increasing her activity tolerance through PT. She was given vimpat and keppra here as well as 4 mg dexamethasone daily.    PHYSICAL EXAM:  GENERAL: Healthy, alert and no distress.   EYES: Eyes grossly normal to inspection.  No discharge or erythema, or obvious scleral/conjunctival abnormalities.  RESP: No audible wheeze, cough, or visible cyanosis.  No visible increased work of breathing.   SKIN: Visible skin clear. No significant rash, abnormal pigmentation or lesions  NEURO: Cranial nerves grossly intact.  Mentation and speech appropriate for age. Mentation appears normal, affect normal/bright, judgement and insight intact, normal speech and appearance well-groomed. 2/5  otherwise 5/5 strength bilaterally     LABS AND IMAGING:    Lab Results   Component Value Date    WBC 6.5 10/07/2023    WBC 3.8 07/09/2021     Lab Results   Component Value Date    RBC 3.65 10/07/2023    RBC 3.52 07/09/2021     Lab Results   Component Value Date    HGB 11.1 10/07/2023    HGB 10.7 07/09/2021     Lab Results   Component Value " Date    HCT 33.3 10/07/2023    HCT 33.9 07/09/2021     No components found for: MCT  Lab Results   Component Value Date    MCV 91 10/07/2023    MCV 96 07/09/2021     Lab Results   Component Value Date    MCH 30.4 10/07/2023    MCH 30.4 07/09/2021     Lab Results   Component Value Date    MCHC 33.3 10/07/2023    MCHC 31.6 07/09/2021     Lab Results   Component Value Date    RDW 12.9 10/07/2023    RDW 14.7 07/09/2021     Lab Results   Component Value Date     10/07/2023     07/09/2021         Last Comprehensive Metabolic Panel:  Sodium   Date Value Ref Range Status   10/10/2023 136 135 - 145 mmol/L Final     Comment:     Reference intervals for this test were updated on 09/26/2023 to more accurately reflect our healthy population. There may be differences in the flagging of prior results with similar values performed with this method. Interpretation of those prior results can be made in the context of the updated reference intervals.    07/09/2021 139 133 - 144 mmol/L Final     Potassium   Date Value Ref Range Status   10/10/2023 4.0 3.4 - 5.3 mmol/L Final   01/13/2023 3.9 3.4 - 5.3 mmol/L Final   07/09/2021 3.7 3.4 - 5.3 mmol/L Final     Chloride   Date Value Ref Range Status   10/10/2023 101 98 - 107 mmol/L Final   01/13/2023 106 94 - 109 mmol/L Final   07/09/2021 109 94 - 109 mmol/L Final     Carbon Dioxide   Date Value Ref Range Status   07/09/2021 25 20 - 32 mmol/L Final     Carbon Dioxide (CO2)   Date Value Ref Range Status   10/10/2023 26 22 - 29 mmol/L Final   01/13/2023 28 20 - 32 mmol/L Final     Anion Gap   Date Value Ref Range Status   10/10/2023 9 7 - 15 mmol/L Final   01/13/2023 6 3 - 14 mmol/L Final   07/09/2021 5 3 - 14 mmol/L Final     Glucose   Date Value Ref Range Status   10/10/2023 91 70 - 99 mg/dL Final   01/13/2023 93 70 - 99 mg/dL Final   07/09/2021 92 70 - 99 mg/dL Final     GLUCOSE BY METER POCT   Date Value Ref Range Status   10/06/2023 116 (H) 70 - 99 mg/dL Final     Urea  Nitrogen   Date Value Ref Range Status   10/10/2023 21.9 8.0 - 23.0 mg/dL Final   01/13/2023 16 7 - 30 mg/dL Final   07/09/2021 10 7 - 30 mg/dL Final     Creatinine   Date Value Ref Range Status   10/10/2023 0.83 0.51 - 0.95 mg/dL Final   07/09/2021 0.55 0.52 - 1.04 mg/dL Final     GFR Estimate   Date Value Ref Range Status   10/10/2023 72 >60 mL/min/1.73m2 Final   07/09/2021 >90 >60 mL/min/[1.73_m2] Final     Comment:     Non  GFR Calc  Starting 12/18/2018, serum creatinine based estimated GFR (eGFR) will be   calculated using the Chronic Kidney Disease Epidemiology Collaboration   (CKD-EPI) equation.       Calcium   Date Value Ref Range Status   10/10/2023 9.2 8.8 - 10.2 mg/dL Final   07/09/2021 9.1 8.5 - 10.1 mg/dL Final     Bilirubin Total   Date Value Ref Range Status   01/13/2023 0.3 0.2 - 1.3 mg/dL Final   06/22/2021 0.3 0.2 - 1.3 mg/dL Final     Alkaline Phosphatase   Date Value Ref Range Status   01/13/2023 82 40 - 150 U/L Final   06/22/2021 103 40 - 150 U/L Final     ALT   Date Value Ref Range Status   01/13/2023 20 0 - 50 U/L Final   06/22/2021 36 0 - 50 U/L Final     AST   Date Value Ref Range Status   01/13/2023 19 0 - 45 U/L Final   06/22/2021 43 0 - 45 U/L Final     Comment:     Specimen is hemolyzed which can falsely elevate AST. Analysis of a   non-hemolyzed specimen may result in a lower value.         Imaging:    10/06/2023 MRI brain with contrast                                                                    IMPRESSION:  1. Postsurgical changes of left frontoparietal mass resections with  unchanged nodular enhancing foci along the resection cavity since  9/2023. Stable smaller foci of enhancement along the perisylvian left  frontal operculum.  2. Otherwise, no new or acute intracranial pathology. No acute  infarct.  3. Periventricular and subcortical white matter T2 hyperintensity  representing chronic small vessel ischemic disease and/or prior  radiation changes.  4. Stable  likely meningioma overlying the right occipital lobe.      Radiation Oncology Pre-Treatment Evaluation:   Prior RT: 4005 cGy to Lt brain 2021  Pacemaker: none  Child-bearing potential (Yes/No): no  Pain: 0/10  Pain plan: n/a  Intent of treatment: (currative/palliative): curative   Side Effects of Radiation: We discussed in detail the management of treatment side effects    IMPRESSION:   Ms. Bailey is a 78 year old female with diagnosis of left parietal MGMT methylated gbm s/p radiation alone ( 4005 cGy in 15 fractions EOT- 5/27/2021) now with radiographic evidence of recurrent disease.    PLAN:   We recommend treatment with radiation therapy.  plans for avastin and adjuvant temolozomide.     We had a detailed discussion with Olga regarding the role of radiation therapy for the treatment of recurrent glioblastoma. We discussed the logistics, timing, risks, benefits, alternatives and side effects associated with radiation therapy, which would likely be delivered 5 days per week for approximately 2 weeks.      Olga  would like to proceed with radiotherapy at [facility].  We have scheduled them for CT-based simulation for radiation. The patient was encouraged to contact us with questions or concerns should they arise in the interim. All questions were answered to the patients's satisfaction, and they were provided with our contact information should any questions or concerns arise.    The overall time I spent on direct patient care including self review of records, labs, and radiographic studies, as well as, direct face-to-face interaction with the patient and coordination of care with other providers was 60 minutes.     Betsy Spann MD, PhD     Department of Radiation Oncology  [facility]

## 2023-10-10 NOTE — PLAN OF CARE
Goal Outcome Evaluation:    Shift: 4572-5555     VS: /70 (BP Location: Left arm)   Pulse 85   Temp 97.2  F (36.2  C) (Oral)   Resp 17   SpO2 94%   Q4 vitals  Pain: Denies pain  Neuro: A&Ox3, able to use softtouch call light. Q4 neuro checks  Resp: Stable on room air. Had episode of aspiration this AM with morning meds, speech consulted and diet adjusted.  Diet: Level 6 diet. Strict I&O.  Skin: No new deficits noted. Gen edema 1+ in her lower exts.   LDA: L PIV running LR @75mL/hr  Activity: Assist of 2 with logan steady and gaitbelt. Up in chairx1  Output: Voids via pure wick. LBM unknown.  Additional info: Neuro consult done today  Call light within reach      Plan:  Plan of care ongoing

## 2023-10-10 NOTE — PROGRESS NOTES
Care Management Follow Up    Length of Stay (days): 3    Expected Discharge Date: TBD     Concerns to be Addressed: Discharge Planning       Patient plan of care discussed at interdisciplinary rounds: Yes    Anticipated Discharge Disposition: TCU       Anticipated Discharge Services: Post Acute Therapies, Radiation     Anticipated Discharge DME: Has all necessary equipment at [facility] that is needed.     Patient/family educated on Medicare website which has current facility and service quality ratings: N/A     Education Provided on the Discharge Plan: Yes     Patient/Family in Agreement with the Plan: Yes    Referrals Placed by CM/SW: TCU     Private pay costs discussed: Not applicable    Additional Information:    Writer spoke with representative at [facility]. Regarding discharge planning. She stated that she would need a call from the nurse regarding her current needs. Writer spoke with MD and patient's spouse regarding plan for return to [facility] today or tomorrow. Patient's spouse stated that he would like patient to discharge to TCU due to his wish to add in extra services at the [facility] and Radiation doctor's recommendation for TCU. Writer sent referral to [facility] for review.    Pending Referrals    [facility]  [address] [place] [address], [place], [address]  [phone]  10/10: Referral Sent    ADDENDUM: [facility] declined patient due to not having a skilled need for TCU. Writer spoke with radiology who will meet with patient and spouse regarding this. Patient will need to be able to make it to/from 10 radiology appointments.    OPAL Esparza, MSW  6 Med Surg and 10 ICU Rooms 1037 and 1039  Phone [phone]  Pager [phone]

## 2023-10-10 NOTE — PLAN OF CARE
[id number]    Patient is A&O x3. Disoriented to time. Soft touch button within reach. Able to make needs known. Needs assistance x1-2 with repositioning. Able to tolerate Yvette steady during transfer from chair to bed. Incontinent in bowel and bladder. Purewick in place. Wears brief. LBM: unable to recall.    RA. Soft and Bite Sized (Level 6), Thin Liquids (Level 0) diet. PIV on L lower forearm infusing with LR at 75ml/hr. Denies pain, chest pain, n/v and n/t.     Seizure precaution maintained throughout the shift. Continue with POC.     Appointment for Radiation Oncology tomorrow, 10/10/2023 at 0830AM in [place]. Transportation set up with [facility] at 0730AM.

## 2023-10-10 NOTE — PROGRESS NOTES
Radiation Therapy Patient Education    Person involved with teaching: Patient and Fahad     Patient educational needs for self management of treatment-related side effects assessment completed.  [facility] Patient Ed tab contains Patient Learning Assessment    Education Materials Given  Simulation hand out     Educational Topics Discussed  When to call MD/RN    Response To Teaching  Verbalizes understanding    GYN Only  Vaginal Dilator-given and educated: N/A    Referrals sent: None    Chemotherapy?  Yes: Avastin with Dr. Baker, message sent to find out anticipated timeline.     Pt c/o nausea while waiting for transport back to [facility], given 4mg zofran per inpatient orders.     Pt and SO also wondering about ability to receive Covid booster and RSV vaccination, deferring to NeuroOncology with steroids and upcoming chemotherapy; okay from a radiation oncology prospective.

## 2023-10-10 NOTE — PROGRESS NOTES
Oncology  Progress Note   Date of Service: 10/10/2023    Patient: Olga Bailey  MRN: [id number]  Admission Date: 10/6/2023  Hospital Day # 3  Cancer Diagnosis: Recurrent glioblastoma (IDH wild-type, MGMT promoter methylated)   Primary Outpatient Oncologist: Dr. Baker  Current Treatment Plan: Radiation + bevacizumab    Assessment & Plan:   Olga Bailey is a 78 year old female with PMHx significant for L frontoparietal glioblastoma with recurrence, history of seizure, history of DVT on rivaroxaban, and cognitive impairment who was admitted 10/6/2023 with worsening aphasia and focal seizure.    Recurrent glioblastoma (IDH wild-type, MGMT promoter methylated)  Initial diagnosis was in Feb 2021 when the patient presented with progressive aphasia. She received treatment with gross total resection (2/23/2021) followed by adjuvant radiation and temozolomide. Disease recurrence seen on surveillance imaging Sep 2023, now admitted with progressive aphasia with focal seizure.     Patient was seen by Dr. Baker (9/26/2023) with the plan for radiation treatment. Will also add concurrent bevacizumab to current regimen as this has demonstrated a clinically meaningful improvement in PFS (Dose 10 mg/kg every 2 weeks per NRT oncology/[id number] study).    Recommendations:   - Continue current dose of dexamethasone, 4 mg BID, for this week  -- Tentative plan to decrease to 4 mg daily starting next week if no new neurologic concerns  - Radiation treatment planned to start on 10/12/2023  - We will coordinate bevacizumab infusion, inpatient vs outpatient, once disposition plan is finalized.     We will continue to follow this patient. Please do not hesitate to page with any questions or concerns.    Patient was seen and plan of care was discussed with attending physician Dr. Boyle.    Dodie Farmer MD  Hematology/Medical Oncology/BMT (PGY-5)  P:  [phone]  -------------------------------------------------------------------------------------------------------------------------------------    Subjective & Interval History:    No acute events noted overnight.    Patient states feeling okay today and denies pain. Her  states that she is doing better from a mental status standpoint.    Last 24 hr care team notes reviewed.   ROS: 4 point ROS including Respiratory, CV, GI and , other than that noted above, is negative.     Physical Exam:    Blood pressure 139/78, pulse 65, temperature 99.2  F (37.3  C), temperature source Oral, resp. rate 16, SpO2 94 %.  General: No acute distress  HEENT: Sclera anicteric  CV: Warm and well-perfused, no edema  Resp: Normal respiratory effort on ambient air  Skin: No rashes or petechiae  Neuro: AOx3, mild expressive aphasia    Labs & Studies: I personally reviewed the following studies:  ROUTINE LABS (Last four results):  CMP  Recent Labs   Lab 10/10/23  1134 10/09/23  0635 10/08/23  0638 10/07/23  0834    140 141 139   POTASSIUM 4.0 4.0 4.1 4.2   CHLORIDE 101 103 104 104   CO2 26 23 22 26   ANIONGAP 9 14 15 9   GLC 91 126* 96 100*   BUN 21.9 21.2 9.1 11.4   CR 0.83 0.81 0.86 0.83   GFRESTIMATED 72 74 69 72   ESTIVEN 9.2 9.6 9.0 9.3   MAG 1.8 1.9 1.9 2.3   PHOS 3.0 3.9 3.7 3.3     CBC  Recent Labs   Lab 10/07/23  0834 10/06/23  2329   WBC 6.5 6.2   RBC 3.65* 3.64*   HGB 11.1* 10.8*   HCT 33.3* 33.6*   MCV 91 92   MCH 30.4 29.7   MCHC 33.3 32.1   RDW 12.9 12.8    186     INR  Recent Labs   Lab 10/06/23  2329   INR 2.27*     Labs, images, and pathology results were reviewed and discussed as pertinent in oncologic history and patient assessment.      ATTENDING PHYSICIAN ADDENDUM AND NOTE:  I evaluated this patient's case separately and also with the oncology fellow, Dr. Farmer.. The note above reflects my assessment and plan. I have personally reviewed lab results, and vital and radiology results. >50% of time  was spent in assessment and coordination of care; >=35 minutes.  Ms. Olga Bailey is a 78-year-old right-handed woman with a left frontoparietal glioblastoma, under the care of neuro-oncologist, Dr. Baker. Recurrence in or around the surgical cavity was diagnosed within the last couple of months, and the patient had been set to initiate five-fraction radiation therapy for recurrent glioblastoma in the coming week or two. She developed worsening expressive aphasia, and new facial twitching notice by her daughter, and thus was brought to the [facility] ER.  I first met Mrs. Bailey in consultation two days ago, 10/10/23, with her , who is a retired head and neck surgeon, steadfastly at her bedside. He reports that she has been doing better with steroids, and that radiation is now planned to be begin two days from now, on Thursday 10/12/23. Dr. Farmer (oncology fellow) has reached out via EPIC direct messaging to primary neuro-oncologist Dr. Baker regarding her orders for bevacizumab to be administered to overlap with the period of radiation, and to provide her an update.  Perez Boyle MD, PhD  Associate Professor of Medicine, Hematology, Oncology and Transplantation

## 2023-10-11 LAB
ANION GAP SERPL CALCULATED.3IONS-SCNC: 13 MMOL/L (ref 7–15)
BUN SERPL-MCNC: 18.4 MG/DL (ref 8–23)
CALCIUM SERPL-MCNC: 9.2 MG/DL (ref 8.8–10.2)
CHLORIDE SERPL-SCNC: 102 MMOL/L (ref 98–107)
CREAT SERPL-MCNC: 0.88 MG/DL (ref 0.51–0.95)
DEPRECATED HCO3 PLAS-SCNC: 24 MMOL/L (ref 22–29)
EGFRCR SERPLBLD CKD-EPI 2021: 67 ML/MIN/1.73M2
GLUCOSE SERPL-MCNC: 88 MG/DL (ref 70–99)
HOLD SPECIMEN: NORMAL
MAGNESIUM SERPL-MCNC: 2.6 MG/DL (ref 1.7–2.3)
PHOSPHATE SERPL-MCNC: 2.9 MG/DL (ref 2.5–4.5)
POTASSIUM SERPL-SCNC: 4 MMOL/L (ref 3.4–5.3)
SODIUM SERPL-SCNC: 139 MMOL/L (ref 135–145)

## 2023-10-11 PROCEDURE — C9254 INJECTION, LACOSAMIDE: HCPCS | Performed by: STUDENT IN AN ORGANIZED HEALTH CARE EDUCATION/TRAINING PROGRAM

## 2023-10-11 PROCEDURE — 36415 COLL VENOUS BLD VENIPUNCTURE: CPT | Performed by: STUDENT IN AN ORGANIZED HEALTH CARE EDUCATION/TRAINING PROGRAM

## 2023-10-11 PROCEDURE — 83735 ASSAY OF MAGNESIUM: CPT | Performed by: STUDENT IN AN ORGANIZED HEALTH CARE EDUCATION/TRAINING PROGRAM

## 2023-10-11 PROCEDURE — 250N000011 HC RX IP 250 OP 636: Mod: JZ | Performed by: STUDENT IN AN ORGANIZED HEALTH CARE EDUCATION/TRAINING PROGRAM

## 2023-10-11 PROCEDURE — 84100 ASSAY OF PHOSPHORUS: CPT | Performed by: STUDENT IN AN ORGANIZED HEALTH CARE EDUCATION/TRAINING PROGRAM

## 2023-10-11 PROCEDURE — 258N000003 HC RX IP 258 OP 636: Performed by: STUDENT IN AN ORGANIZED HEALTH CARE EDUCATION/TRAINING PROGRAM

## 2023-10-11 PROCEDURE — 250N000013 HC RX MED GY IP 250 OP 250 PS 637: Performed by: STUDENT IN AN ORGANIZED HEALTH CARE EDUCATION/TRAINING PROGRAM

## 2023-10-11 PROCEDURE — 80048 BASIC METABOLIC PNL TOTAL CA: CPT | Performed by: INTERNAL MEDICINE

## 2023-10-11 PROCEDURE — 120N000002 HC R&B MED SURG/OB UMMC

## 2023-10-11 RX ADMIN — BUSPIRONE HYDROCHLORIDE 30 MG: 30 TABLET ORAL at 19:38

## 2023-10-11 RX ADMIN — SODIUM CHLORIDE, POTASSIUM CHLORIDE, SODIUM LACTATE AND CALCIUM CHLORIDE: 600; 310; 30; 20 INJECTION, SOLUTION INTRAVENOUS at 19:58

## 2023-10-11 RX ADMIN — LEVETIRACETAM 1250 MG: 100 SOLUTION ORAL at 19:38

## 2023-10-11 RX ADMIN — ACETAMINOPHEN 500 MG: 325 TABLET, FILM COATED ORAL at 19:39

## 2023-10-11 RX ADMIN — AMLODIPINE BESYLATE 5 MG: 5 TABLET ORAL at 09:31

## 2023-10-11 RX ADMIN — Medication 40 MG: at 09:29

## 2023-10-11 RX ADMIN — Medication 40 MG: at 16:06

## 2023-10-11 RX ADMIN — FLUOXETINE HYDROCHLORIDE 40 MG: 20 SOLUTION ORAL at 09:49

## 2023-10-11 RX ADMIN — RIVAROXABAN 20 MG: 10 TABLET, FILM COATED ORAL at 16:06

## 2023-10-11 RX ADMIN — DEXAMETHASONE SODIUM PHOSPHATE 4 MG: 10 INJECTION, SOLUTION INTRAMUSCULAR; INTRAVENOUS at 19:47

## 2023-10-11 RX ADMIN — BUSPIRONE HYDROCHLORIDE 30 MG: 30 TABLET ORAL at 09:48

## 2023-10-11 RX ADMIN — LACOSAMIDE 100 MG: 10 INJECTION INTRAVENOUS at 19:59

## 2023-10-11 RX ADMIN — DEXAMETHASONE SODIUM PHOSPHATE 4 MG: 10 INJECTION, SOLUTION INTRAMUSCULAR; INTRAVENOUS at 09:29

## 2023-10-11 RX ADMIN — SENNOSIDES AND DOCUSATE SODIUM 1 TABLET: 50; 8.6 TABLET ORAL at 09:29

## 2023-10-11 RX ADMIN — LACOSAMIDE 100 MG: 10 INJECTION INTRAVENOUS at 09:50

## 2023-10-11 RX ADMIN — SULFAMETHOXAZOLE AND TRIMETHOPRIM 1 TABLET: 800; 160 TABLET ORAL at 09:31

## 2023-10-11 RX ADMIN — SODIUM CHLORIDE, POTASSIUM CHLORIDE, SODIUM LACTATE AND CALCIUM CHLORIDE: 600; 310; 30; 20 INJECTION, SOLUTION INTRAVENOUS at 06:10

## 2023-10-11 RX ADMIN — SENNOSIDES AND DOCUSATE SODIUM 1 TABLET: 50; 8.6 TABLET ORAL at 19:39

## 2023-10-11 RX ADMIN — THERA TABS 1 TABLET: TAB at 09:29

## 2023-10-11 RX ADMIN — LEVETIRACETAM 1250 MG: 100 SOLUTION ORAL at 09:50

## 2023-10-11 ASSESSMENT — ACTIVITIES OF DAILY LIVING (ADL)
ADLS_ACUITY_SCORE: 53
ADLS_ACUITY_SCORE: 55
ADLS_ACUITY_SCORE: 53
ADLS_ACUITY_SCORE: 55
ADLS_ACUITY_SCORE: 55
ADLS_ACUITY_SCORE: 53
ADLS_ACUITY_SCORE: 55
ADLS_ACUITY_SCORE: 53
ADLS_ACUITY_SCORE: 55
ADLS_ACUITY_SCORE: 55

## 2023-10-11 NOTE — PLAN OF CARE
Goal Outcome Evaluation:    Pt. Is A/O x4, makes need known. Pt denies general pain, CP, SOB, N/V, T/N. No acute change in this shift. Continue POC.

## 2023-10-11 NOTE — PROGRESS NOTES
"Care Management Follow Up    Length of Stay (days): 4    Expected Discharge Date: 10/09/2023     Concerns to be Addressed: discharge planning       Patient plan of care discussed at interdisciplinary rounds: Yes    Anticipated Discharge Disposition: [facility] versus TCU       Anticipated Discharge Services: Post Acute Therapies, Assistance with ADLs and IADLs, Radiation     Anticipated Discharge DME: Lift, Hospital Bed, Wheelchair, Shower Chair     Patient/family educated on Medicare website which has current facility and service quality ratings: No     Education Provided on the Discharge Plan: Yes     Patient/Family in Agreement with the Plan: No     Referrals Placed by CM/SW: TCU    Private pay costs discussed: Not applicable    Additional Information:    Writer met with patient and spouse along with RNCC to discuss [facility]'s decline and return to [facility]. Spouse states that Assisted Living will not take patient back. Writer called Assisted Living who stated, \"We did not say that\". Assisted Living is willing to accept patient back, but need to have the discharge paperwork in advance. Spouse reports, \"This will be a disaster.\" \"She cannot return there without extra nursing.\" Writer and RNCC spoke with MD and he put in another PT/OT consult to reassess patient for possible change in recommendations. Spouse also stated that he would not be able to provide transportation for patient to radiation appointments. Writer to continue to follow.     OPAL Esparza, MSW  6 Med Surg and 10 ICU Rooms 1037 and 1039  Phone [phone]  Pager [phone]      "

## 2023-10-11 NOTE — PLAN OF CARE
Goal Outcome Evaluation:    Shift: 1500 - 1930  Neuros & VS q4hrs    Vital Signs: Temp: 99.7  F (37.6  C) Temp src: Oral BP: 130/70 Pulse: 68   Resp: 16 SpO2: 93 % O2 Device: None (Room air)     CMS/Neuro: A&O to baseline - Talks slowly  Small tremor in hands     Output: Incontinent of B/B - LBM: 10/10/2023 - Some red streaks     Activity: Assist x 2 w/ lift     Pain: Denied     LDA: L. Forearm PIV - Patent, Currently infusing LR @ 75 mL/hr     Diet: Soft & bite sized (level 6) and Thin liquids - small and slow bites  Meds crushed in apple sauce     Additional Info: Call light w/in reach and able to make needs known.   at bedside  Fall & seizure precautions maintained     Plan: Continue POC - Discharge TBD       Emily Villanueva, RN, BSN, PHN

## 2023-10-11 NOTE — PROGRESS NOTES
Department of Radiation Oncology  [facility]  [address] [place] [address]  [phone]       Radiation Oncology Virtual Follow-up Visit  October 10, 2023      Olga Bailey  MRN: [id number]   : 1945     DISEASE TREATED:   Glioblastoma, IDH1/IDH2 wild type, ATRX intact, p53 and PTEN mutant, BRAF  non-mutated, MGMT promoter methylated.                                   RADIATION THERAPY DELIVERED:   4,005 cGy in 15 fractions, from 2021 - 2021    SYSTEMIC THERAPY:  Plans to start adjuvant temozolomide, avastin    INTERVAL SINCE COMPLETION OF RADIATION THERAPY:   28 months    SUBJECTIVE:   Olga Bailey is a 78 year old female with known diagnosis of glioblastoma. She first presented with progressive aphasia in 2021, and underwent brain MRI with and without contrast on 2021, which revealed a 3.3 x 2.8 x 2.8 cm enhancing mass in left frontal-parietal region. The patient underwent left parietal craniotomy and computer-assisted stereotactic volumetric resection of the tumor using neuro-navigation with Dr. Cummings of Neurosurgery at [facility] on 2021. Surgical pathology, and subsequent glioma focused NGS panel together confirmed her diagnosis as outlined above.      She experienced prolonged hospitalization post-surgery for complications including DVT and pneumonia. In this interval, her performance status has declined with need for increased respiratory support. She was transferred to [facility] and proceeded with adjuvant radiotherapy alone using hypofractionation scheme as outlined above, while she was continued on close monitoring of her respiratory status. Given her performance status, radiation treatment alone was offered.     She was discharged to our transitional care unit on 5/15/2021 and she continued to receive radiation as she undergo rehabilitation. On 2021, she had a seizure episode, prompting emergent  "hospitalization to [facility] for workup. CT head yielded no acute findings or change. Neurology was consulted, and she was started on Keppra and Decadron tapering (2 mg BID for 1 wk, 2 mg daily for 1 wk). Otherwise, Ms. Bailey tolerated her treatment well, with development of fatigue as anticipated. On her last day of treatment, she reported vision consistent with \"dirty glasses\" that has occurred throughout the duration of her hospital stay and is stable. No other associated symptoms were reported.      She completed adjuvant temozolomide in 2021.    She has been doing well,with the exception of progression on imaging and admission for a focal seizure on 10/06, and  is increasing her activity tolerance through PT. She was given vimpat and keppra here as well as 4 mg dexamethasone daily.    PHYSICAL EXAM:  GENERAL: Healthy, alert and no distress.   EYES: Eyes grossly normal to inspection.  No discharge or erythema, or obvious scleral/conjunctival abnormalities.  RESP: No audible wheeze, cough, or visible cyanosis.  No visible increased work of breathing.   SKIN: Visible skin clear. No significant rash, abnormal pigmentation or lesions  NEURO: Cranial nerves grossly intact.  Mentation and speech appropriate for age. Mentation appears normal, affect normal/bright, judgement and insight intact, normal speech and appearance well-groomed. 2/5  otherwise 5/5 strength bilaterally     LABS AND IMAGING:    Lab Results   Component Value Date    WBC 6.5 10/07/2023    WBC 3.8 07/09/2021     Lab Results   Component Value Date    RBC 3.65 10/07/2023    RBC 3.52 07/09/2021     Lab Results   Component Value Date    HGB 11.1 10/07/2023    HGB 10.7 07/09/2021     Lab Results   Component Value Date    HCT 33.3 10/07/2023    HCT 33.9 07/09/2021     No components found for: MCT  Lab Results   Component Value Date    MCV 91 10/07/2023    MCV 96 07/09/2021     Lab Results   Component Value Date    MCH 30.4 10/07/2023    MCH 30.4 07/09/2021 "     Lab Results   Component Value Date    MCHC 33.3 10/07/2023    MCHC 31.6 07/09/2021     Lab Results   Component Value Date    RDW 12.9 10/07/2023    RDW 14.7 07/09/2021     Lab Results   Component Value Date     10/07/2023     07/09/2021         Last Comprehensive Metabolic Panel:  Sodium   Date Value Ref Range Status   10/10/2023 136 135 - 145 mmol/L Final     Comment:     Reference intervals for this test were updated on 09/26/2023 to more accurately reflect our healthy population. There may be differences in the flagging of prior results with similar values performed with this method. Interpretation of those prior results can be made in the context of the updated reference intervals.    07/09/2021 139 133 - 144 mmol/L Final     Potassium   Date Value Ref Range Status   10/10/2023 4.0 3.4 - 5.3 mmol/L Final   01/13/2023 3.9 3.4 - 5.3 mmol/L Final   07/09/2021 3.7 3.4 - 5.3 mmol/L Final     Chloride   Date Value Ref Range Status   10/10/2023 101 98 - 107 mmol/L Final   01/13/2023 106 94 - 109 mmol/L Final   07/09/2021 109 94 - 109 mmol/L Final     Carbon Dioxide   Date Value Ref Range Status   07/09/2021 25 20 - 32 mmol/L Final     Carbon Dioxide (CO2)   Date Value Ref Range Status   10/10/2023 26 22 - 29 mmol/L Final   01/13/2023 28 20 - 32 mmol/L Final     Anion Gap   Date Value Ref Range Status   10/10/2023 9 7 - 15 mmol/L Final   01/13/2023 6 3 - 14 mmol/L Final   07/09/2021 5 3 - 14 mmol/L Final     Glucose   Date Value Ref Range Status   10/10/2023 91 70 - 99 mg/dL Final   01/13/2023 93 70 - 99 mg/dL Final   07/09/2021 92 70 - 99 mg/dL Final     GLUCOSE BY METER POCT   Date Value Ref Range Status   10/06/2023 116 (H) 70 - 99 mg/dL Final     Urea Nitrogen   Date Value Ref Range Status   10/10/2023 21.9 8.0 - 23.0 mg/dL Final   01/13/2023 16 7 - 30 mg/dL Final   07/09/2021 10 7 - 30 mg/dL Final     Creatinine   Date Value Ref Range Status   10/10/2023 0.83 0.51 - 0.95 mg/dL Final   07/09/2021  0.55 0.52 - 1.04 mg/dL Final     GFR Estimate   Date Value Ref Range Status   10/10/2023 72 >60 mL/min/1.73m2 Final   07/09/2021 >90 >60 mL/min/[1.73_m2] Final     Comment:     Non  GFR Calc  Starting 12/18/2018, serum creatinine based estimated GFR (eGFR) will be   calculated using the Chronic Kidney Disease Epidemiology Collaboration   (CKD-EPI) equation.       Calcium   Date Value Ref Range Status   10/10/2023 9.2 8.8 - 10.2 mg/dL Final   07/09/2021 9.1 8.5 - 10.1 mg/dL Final     Bilirubin Total   Date Value Ref Range Status   01/13/2023 0.3 0.2 - 1.3 mg/dL Final   06/22/2021 0.3 0.2 - 1.3 mg/dL Final     Alkaline Phosphatase   Date Value Ref Range Status   01/13/2023 82 40 - 150 U/L Final   06/22/2021 103 40 - 150 U/L Final     ALT   Date Value Ref Range Status   01/13/2023 20 0 - 50 U/L Final   06/22/2021 36 0 - 50 U/L Final     AST   Date Value Ref Range Status   01/13/2023 19 0 - 45 U/L Final   06/22/2021 43 0 - 45 U/L Final     Comment:     Specimen is hemolyzed which can falsely elevate AST. Analysis of a   non-hemolyzed specimen may result in a lower value.         Imaging:    10/06/2023 MRI brain with contrast                                                                    IMPRESSION:  1. Postsurgical changes of left frontoparietal mass resections with  unchanged nodular enhancing foci along the resection cavity since  9/2023. Stable smaller foci of enhancement along the perisylvian left  frontal operculum.  2. Otherwise, no new or acute intracranial pathology. No acute  infarct.  3. Periventricular and subcortical white matter T2 hyperintensity  representing chronic small vessel ischemic disease and/or prior  radiation changes.  4. Stable likely meningioma overlying the right occipital lobe.      Radiation Oncology Pre-Treatment Evaluation:   Prior RT: 4005 cGy to Lt brain 2021  Pacemaker: none  Child-bearing potential (Yes/No): no  Pain: 0/10  Pain plan: n/a  Intent of treatment:  (currative/palliative): curative   Side Effects of Radiation: We discussed in detail the management of treatment side effects    IMPRESSION:   Ms. Bailey is a 78 year old female with diagnosis of left parietal MGMT methylated gbm s/p radiation alone ( 4005 cGy in 15 fractions EOT- 5/27/2021) now with radiographic evidence of recurrent disease.    PLAN:   We recommend treatment with radiation therapy.  plans for avastin and adjuvant temolozomide.     We had a detailed discussion with Olga regarding the role of radiation therapy for the treatment of recurrent glioblastoma. We discussed the logistics, timing, risks, benefits, alternatives and side effects associated with radiation therapy, which would likely be delivered 5 days per week for approximately 2 weeks.      Olga  would like to proceed with radiotherapy at [facility].  We have scheduled them for CT-based simulation for radiation. The patient was encouraged to contact us with questions or concerns should they arise in the interim. All questions were answered to the patients's satisfaction, and they were provided with our contact information should any questions or concerns arise.    The overall time I spent on direct patient care including self review of records, labs, and radiographic studies, as well as, direct face-to-face interaction with the patient and coordination of care with other providers was 60 minutes.     Betsy Spann MD, PhD     Department of Radiation Oncology  [facility]

## 2023-10-11 NOTE — PROGRESS NOTES
[facility]    Medicine Progress Note - Hospitalist Service, GOLD TEAM 21    Date of Admission:  10/6/2023    Assessment & Plan   Olga Bailey is a 78 year old female admitted on 10/6/2023. She has history of L frontoparietal glioblastoma s/p resection,chemotherapy and radiation with remission in 2021, recently found to have recurrence in September 2023, with baseline cognitive and functional impairment, h/o seizures, h/o DVT on xarelto, HTN, and depression who presented 10/6/23 with acute worsening of baseline aphasia as well as new facial twitching. Admitted to internal medicine for further work up and management.     Updates  -long discussion with spouse and SW today about dispo. Spouse not comfortable with SNF reporting functional status still not to baseline  -last OT note 10/8 reviewed. Requested updated OT eval for dispo to TCU vs other.   -spoke with radiation oncology. Can begin RT at [facility] or at [facility].   -spoke with hematology oncology. Planning for bevacizumab infusion prior to discharge.   -dispo pending OT eval.     Partial Seizure: previously controlled  Worsened chronic aphasia   Difficulty Swallowing  Presented with acute (< 1 day) worsening of aphasia and new facial twitching with abnormal mouth movements concerning for partial seizure.  She underwent stroke eval in the ED which was reassuring against acute CVA,   Given recent recurrence of glioblastoma, there was concern for spread or advance of disease, though both CT and MRI were reassuring to stability of current malignancy from last imaging (9/2023).  No metabolic derangements to explain acute change, and norm WBC with no focal findings was reassuring against acute infection.  Neurology consulted and felt movements were consistent with partial seizure, lacosamide was added, though subsequent EEG showed some degree of persistent seizure activity, and further brief clinical seizures were  observed.  Neurologist discussed risk/benefit of a possible third antiepileptic medication (which could carry a risk of heavier sedation), the family elected to remain on the two current medications for quality of life reasons.  - Neurology consulted, appreciate thoughtful input  - Continue Keppra 1250 mg BID   - Lacosamide 200mg loading dose (complete) then 100mg BID   - will transition to PO prior to discharge, same dose   - Could increase to 150mg BID if increased seizure burden  - May reconsider third agent should seizure burden change drastically (ie Grand Mal, etc)   - Ativan PRN for any seizure activity > 2 minutes or increased clusters of seizure like activity   - Seizure precautions   - Neuro checks     H/o L frontoparietal glioblastoma s/p resection, chemotherapy and radiation (completed May 2021), with recurrence of malignancy 2023   Cognitive and functional impairment due to glioblastoma and chemoradiation   Seen by oncology on this visit, repeat imaging stable from September. Started on trial of steroid, will eval symptom improvement. Patient has developed worsening difficulty with swallowing, seems to have become an issue over the past few days, per patient (started prior to steroid).   - Oncology consulted, appreciate input  - Started dexamethasone 4mg BID  (10/9)   - Onc following, expect one week at current dose, then possible taper.    - TMP-SMX daily for PCP PPX (pt had hx of PJP)   - Rad/Onc consulted, appreciate thoughtful case review.   - PT/OT consulted, appreciate input.      Likely aspiration pneumonitis  Coughing with medications, significant water suctioned.  Felt a little short of breath afterwords, which she felt improved with a duoneb.  On exam, she does have scattered bilateral wheezes present.   - DuoNeb QID PRN   - SLP consult completed, appreciate input. Soft diet level 6 with thin liquids.    Depression   - Continue PTA Buspirone and Fluoxetine     H/o DVT   - Continue PTA Xarelto      HTN   - Continue PTA Amlodipine           Diet: Soft & Bite Sized Diet (level 6) Thin Liquids (level 0)    DVT Prophylaxis: DOAC  Martino Catheter: Not present  Lines: None     Cardiac Monitoring: None  Code Status: Full Code      Clinically Significant Risk Factors                  # Hypertension: Noted on problem list                   Disposition Plan             Tommy Odom MD  Hospitalist Service, GOLD TEAM 21  [facility]  Securely message with [employer] (more info)  Text page via [facility] [facility] Paging/Directory   See signed in provider for up to date coverage information  ______________________________________________________________________    Interval History   No new events   Not at functional baseline per spouse    Physical Exam   Vital Signs: Temp: 97.8  F (36.6  C) Temp src: Oral BP: 137/68 Pulse: 59   Resp: 18 SpO2: 94 % O2 Device: None (Room air)    Weight: 0 lbs 0 oz    General Appearance: alert and interactive  Respiratory: clear  Cardiovascular: regular  GI: soft nt  Skin: wwp   Other: Slow speech      Medical Decision Making       60 MINUTES SPENT BY ME on the date of service doing chart review, history, exam, documentation & further activities per the note.      Data

## 2023-10-11 NOTE — PLAN OF CARE
Goal Outcome Evaluation:  Plan of Care Reviewed With: spouse, patient  Overall Patient Progress: improving        VS: Temp: (P) 98.1  F (36.7  C) Temp src: (P) Axillary BP: (P) 139/87 Pulse: (P) 89   Resp: (P) 16 SpO2: (P) 94 % O2 Device: (P) None (Room air)     O2: stable on RA  Denies chest pain and SOB.    Output: Incontinent. Uses purewick   Activity: Up with Assist   CMS: Intact, Aox4. pt able to make needs known.    Diet: Soft & Bite Sized Diet (level 6) Thin Liquids (level 0)     LDA: L PIV infusing LR at 75 mL/hr.   Equipment: IV pole, personal belongings,    Plan: Continue with plan of care.   Call light within reach,    Additional Info: Family requested bed bath. RN notified

## 2023-10-12 ENCOUNTER — APPOINTMENT (OUTPATIENT)
Dept: SPEECH THERAPY | Facility: CLINIC | Age: 78
DRG: 054 | End: 2023-10-12
Attending: STUDENT IN AN ORGANIZED HEALTH CARE EDUCATION/TRAINING PROGRAM
Payer: MEDICARE

## 2023-10-12 ENCOUNTER — APPOINTMENT (OUTPATIENT)
Dept: OCCUPATIONAL THERAPY | Facility: CLINIC | Age: 78
DRG: 054 | End: 2023-10-12
Attending: STUDENT IN AN ORGANIZED HEALTH CARE EDUCATION/TRAINING PROGRAM
Payer: MEDICARE

## 2023-10-12 ENCOUNTER — APPOINTMENT (OUTPATIENT)
Dept: RADIATION ONCOLOGY | Facility: CLINIC | Age: 78
End: 2023-10-12
Attending: STUDENT IN AN ORGANIZED HEALTH CARE EDUCATION/TRAINING PROGRAM
Payer: MEDICARE

## 2023-10-12 DIAGNOSIS — C71.9 GLIOBLASTOMA MULTIFORME OF BRAIN (H): Primary | ICD-10-CM

## 2023-10-12 LAB
ANION GAP SERPL CALCULATED.3IONS-SCNC: 15 MMOL/L (ref 7–15)
BUN SERPL-MCNC: 19.4 MG/DL (ref 8–23)
CALCIUM SERPL-MCNC: 9.1 MG/DL (ref 8.8–10.2)
CHLORIDE SERPL-SCNC: 102 MMOL/L (ref 98–107)
CREAT SERPL-MCNC: 0.84 MG/DL (ref 0.51–0.95)
DEPRECATED HCO3 PLAS-SCNC: 22 MMOL/L (ref 22–29)
EGFRCR SERPLBLD CKD-EPI 2021: 71 ML/MIN/1.73M2
ERYTHROCYTE [DISTWIDTH] IN BLOOD BY AUTOMATED COUNT: 12.4 % (ref 10–15)
GLUCOSE SERPL-MCNC: 123 MG/DL (ref 70–99)
HCT VFR BLD AUTO: 32.7 % (ref 35–47)
HGB BLD-MCNC: 10.7 G/DL (ref 11.7–15.7)
MAGNESIUM SERPL-MCNC: 1.8 MG/DL (ref 1.7–2.3)
MCH RBC QN AUTO: 29.6 PG (ref 26.5–33)
MCHC RBC AUTO-ENTMCNC: 32.7 G/DL (ref 31.5–36.5)
MCV RBC AUTO: 90 FL (ref 78–100)
PHOSPHATE SERPL-MCNC: 2.8 MG/DL (ref 2.5–4.5)
PLATELET # BLD AUTO: 223 10E3/UL (ref 150–450)
POTASSIUM SERPL-SCNC: 3.6 MMOL/L (ref 3.4–5.3)
RBC # BLD AUTO: 3.62 10E6/UL (ref 3.8–5.2)
SODIUM SERPL-SCNC: 139 MMOL/L (ref 135–145)
WBC # BLD AUTO: 8.1 10E3/UL (ref 4–11)

## 2023-10-12 PROCEDURE — 92526 ORAL FUNCTION THERAPY: CPT | Mod: GN

## 2023-10-12 PROCEDURE — 999N000157 HC STATISTIC RCP TIME EA 10 MIN

## 2023-10-12 PROCEDURE — 85027 COMPLETE CBC AUTOMATED: CPT | Performed by: INTERNAL MEDICINE

## 2023-10-12 PROCEDURE — 80048 BASIC METABOLIC PNL TOTAL CA: CPT | Performed by: INTERNAL MEDICINE

## 2023-10-12 PROCEDURE — 84100 ASSAY OF PHOSPHORUS: CPT | Performed by: STUDENT IN AN ORGANIZED HEALTH CARE EDUCATION/TRAINING PROGRAM

## 2023-10-12 PROCEDURE — 36415 COLL VENOUS BLD VENIPUNCTURE: CPT | Performed by: INTERNAL MEDICINE

## 2023-10-12 PROCEDURE — C9254 INJECTION, LACOSAMIDE: HCPCS | Performed by: STUDENT IN AN ORGANIZED HEALTH CARE EDUCATION/TRAINING PROGRAM

## 2023-10-12 PROCEDURE — 120N000002 HC R&B MED SURG/OB UMMC

## 2023-10-12 PROCEDURE — 83735 ASSAY OF MAGNESIUM: CPT | Performed by: STUDENT IN AN ORGANIZED HEALTH CARE EDUCATION/TRAINING PROGRAM

## 2023-10-12 PROCEDURE — 250N000013 HC RX MED GY IP 250 OP 250 PS 637: Performed by: STUDENT IN AN ORGANIZED HEALTH CARE EDUCATION/TRAINING PROGRAM

## 2023-10-12 PROCEDURE — 258N000003 HC RX IP 258 OP 636: Performed by: STUDENT IN AN ORGANIZED HEALTH CARE EDUCATION/TRAINING PROGRAM

## 2023-10-12 PROCEDURE — 250N000009 HC RX 250: Performed by: STUDENT IN AN ORGANIZED HEALTH CARE EDUCATION/TRAINING PROGRAM

## 2023-10-12 PROCEDURE — 250N000011 HC RX IP 250 OP 636: Mod: JZ | Performed by: STUDENT IN AN ORGANIZED HEALTH CARE EDUCATION/TRAINING PROGRAM

## 2023-10-12 PROCEDURE — 77386 HC IMRT TREATMENT DELIVERY, COMPLEX: CPT | Performed by: STUDENT IN AN ORGANIZED HEALTH CARE EDUCATION/TRAINING PROGRAM

## 2023-10-12 PROCEDURE — 250N000013 HC RX MED GY IP 250 OP 250 PS 637: Performed by: INTERNAL MEDICINE

## 2023-10-12 PROCEDURE — 94640 AIRWAY INHALATION TREATMENT: CPT | Mod: 76

## 2023-10-12 PROCEDURE — 97530 THERAPEUTIC ACTIVITIES: CPT | Mod: GO

## 2023-10-12 RX ORDER — ALBUTEROL SULFATE 5 MG/ML
2.5 SOLUTION RESPIRATORY (INHALATION) EVERY 6 HOURS PRN
Status: DISCONTINUED | OUTPATIENT
Start: 2023-10-12 | End: 2023-10-24

## 2023-10-12 RX ADMIN — FLUOXETINE HYDROCHLORIDE 40 MG: 20 SOLUTION ORAL at 07:56

## 2023-10-12 RX ADMIN — LACOSAMIDE 100 MG: 10 INJECTION INTRAVENOUS at 20:15

## 2023-10-12 RX ADMIN — DEXAMETHASONE SODIUM PHOSPHATE 4 MG: 10 INJECTION, SOLUTION INTRAMUSCULAR; INTRAVENOUS at 19:34

## 2023-10-12 RX ADMIN — SULFAMETHOXAZOLE AND TRIMETHOPRIM 1 TABLET: 800; 160 TABLET ORAL at 07:55

## 2023-10-12 RX ADMIN — RIVAROXABAN 20 MG: 10 TABLET, FILM COATED ORAL at 18:41

## 2023-10-12 RX ADMIN — ACETAMINOPHEN 500 MG: 325 TABLET, FILM COATED ORAL at 13:04

## 2023-10-12 RX ADMIN — SENNOSIDES AND DOCUSATE SODIUM 1 TABLET: 50; 8.6 TABLET ORAL at 07:55

## 2023-10-12 RX ADMIN — Medication 40 MG: at 18:42

## 2023-10-12 RX ADMIN — DEXAMETHASONE SODIUM PHOSPHATE 4 MG: 10 INJECTION, SOLUTION INTRAMUSCULAR; INTRAVENOUS at 08:08

## 2023-10-12 RX ADMIN — THERA TABS 1 TABLET: TAB at 07:55

## 2023-10-12 RX ADMIN — IPRATROPIUM BROMIDE AND ALBUTEROL SULFATE 3 ML: 2.5; .5 SOLUTION RESPIRATORY (INHALATION) at 20:13

## 2023-10-12 RX ADMIN — AMLODIPINE BESYLATE 5 MG: 5 TABLET ORAL at 07:55

## 2023-10-12 RX ADMIN — GLYCERIN, PETROLATUM, PHENYLEPHRINE HCL, PRAMOXINE HCL: 144; 2.5; 10; 15 CREAM TOPICAL at 15:11

## 2023-10-12 RX ADMIN — Medication 40 MG: at 07:57

## 2023-10-12 RX ADMIN — BUSPIRONE HYDROCHLORIDE 30 MG: 30 TABLET ORAL at 19:34

## 2023-10-12 RX ADMIN — LEVETIRACETAM 1250 MG: 100 SOLUTION ORAL at 19:35

## 2023-10-12 RX ADMIN — LACOSAMIDE 100 MG: 10 INJECTION INTRAVENOUS at 08:10

## 2023-10-12 RX ADMIN — SENNOSIDES AND DOCUSATE SODIUM 1 TABLET: 50; 8.6 TABLET ORAL at 19:34

## 2023-10-12 RX ADMIN — LEVETIRACETAM 1250 MG: 100 SOLUTION ORAL at 07:56

## 2023-10-12 RX ADMIN — BUSPIRONE HYDROCHLORIDE 30 MG: 30 TABLET ORAL at 07:55

## 2023-10-12 ASSESSMENT — ACTIVITIES OF DAILY LIVING (ADL)
ADLS_ACUITY_SCORE: 53

## 2023-10-12 NOTE — LETTER
10/12/2023         RE: Olga Bailey  [address]  [place] [address]        Dear Colleague,    Thank you for referring your patient, Olga Bailey, to the [facility] RADIATION ONCOLOGY. Please see a copy of my visit note below.    [facility]  SPECIALIZING IN BREAKTHROUGHS  Radiation Oncology    On Treatment Visit Note      Olga Bailey      Date: Oct 12, 2023   MRN: [id number]   : 1945         Reason for Visit:  On Radiation Treatment Visit     Treatment Summary to Date   LtFrontReTx   350 cGy   Total dose 3500 Gy          Subjective:   Olga is feeling well today. She arrived from [facility].       Objective:   There were no vitals taken for this visit.  Gen: Appears well, in no acute distress  Skin: No erythema    Labs:  CBC RESULTS: Recent Labs   Lab Test 10/14/23  0731   WBC 8.8   RBC 3.22*   HGB 9.6*   HCT 29.5*   MCV 92   MCH 29.8   MCHC 32.5   RDW 13.1        ELECTROLYTES:  Recent Labs   Lab Test 10/14/23  0731      POTASSIUM 3.8   CHLORIDE 104   ESTIVEN 9.2   CO2 25   BUN 17.1   CR 0.79   *       Assessment:    Tolerating radiation therapy well.  All questions and concerns addressed.    Toxicities:  None    Plan:   Continue current therapy.  Can continue as outpt when discharged.       Mosaiq chart and setup information reviewed  MVCT reviewed        Betsy Spann MD, PhD     Department of Radiation Oncology  [facility]

## 2023-10-12 NOTE — PROGRESS NOTES
[facility]    Medicine Progress Note - Hospitalist Service, GOLD TEAM 21    Date of Admission:  10/6/2023    Assessment & Plan   Olga Bailey is a 78 year old female admitted on 10/6/2023. She has history of L frontoparietal glioblastoma s/p resection,chemotherapy and radiation with remission in 2021, recently found to have recurrence in September 2023, with baseline cognitive and functional impairment, h/o seizures, h/o DVT on xarelto, HTN, and depression who presented 10/6/23 with acute worsening of baseline aphasia as well as new facial twitching. Admitted to internal medicine for further work up and management.     Updates  -still reportedly below baseline  -awaiting updated OT eval for dispo   -RT has been planned to start at [facility] today    Partial Seizure: previously controlled  Worsened chronic aphasia   Difficulty Swallowing  Presented with acute (< 1 day) worsening of aphasia and new facial twitching with abnormal mouth movements concerning for partial seizure.  She underwent stroke eval in the ED which was reassuring against acute CVA,   Given recent recurrence of glioblastoma, there was concern for spread or advance of disease, though both CT and MRI were reassuring to stability of current malignancy from last imaging (9/2023).  No metabolic derangements to explain acute change, and norm WBC with no focal findings was reassuring against acute infection.  Neurology consulted and felt movements were consistent with partial seizure, lacosamide was added, though subsequent EEG showed some degree of persistent seizure activity, and further brief clinical seizures were observed.  Neurologist discussed risk/benefit of a possible third antiepileptic medication (which could carry a risk of heavier sedation), the family elected to remain on the two current medications for quality of life reasons.  - Neurology consulted, appreciate thoughtful input  - Continue Keppra  1250 mg BID   - Lacosamide 200mg loading dose (complete) then 100mg BID   - will transition to PO prior to discharge, same dose   - Could increase to 150mg BID if increased seizure burden but stable on current dosing   - May reconsider third agent should seizure burden change drastically (ie Grand Mal, etc)   - Ativan PRN for any seizure activity > 2 minutes or increased clusters of seizure like activity   - Seizure precautions   - Neuro checks     H/o L frontoparietal glioblastoma s/p resection, chemotherapy and radiation (completed May 2021), with recurrence of malignancy 2023   Cognitive and functional impairment due to glioblastoma and chemoradiation   Seen by oncology on this visit, repeat imaging stable from September. Started on trial of steroid, will eval symptom improvement. Patient has developed worsening difficulty with swallowing, seems to have become an issue over the past few days, per patient (started prior to steroid).   - Oncology consulted, appreciate input  - Started dexamethasone 4mg BID  (10/9)   - Onc following, expect one week at current dose, then possible taper.    - TMP-SMX daily for PCP PPX (pt had hx of PJP)   - Rad/Onc consulted, appreciate thoughtful case review.   - PT/OT consulted, appreciate input.      Likely aspiration pneumonitis  Coughing with medications, significant water suctioned.  Felt a little short of breath afterwords, which she felt improved with a duoneb.  On exam, she does have scattered bilateral wheezes present.   - DuoNeb QID PRN   - SLP consult completed, appreciate input. Soft diet level 6 with thin liquids.    Depression   - Continue PTA Buspirone and Fluoxetine     H/o DVT   - Continue PTA Xarelto     HTN   - Continue PTA Amlodipine     Hemorrhoids   -topical preparation H  -monitor           Diet: Soft & Bite Sized Diet (level 6) Thin Liquids (level 0)    DVT Prophylaxis: DOAC  Martino Catheter: Not present  Lines: None     Cardiac Monitoring: None  Code Status:  Full Code      Clinically Significant Risk Factors                  # Hypertension: Noted on problem list                   Disposition Plan             Tommy Odom MD  Hospitalist Service, GOLD TEAM 21  [facility]  Securely message with [employer] (more info)  Text page via [employer] Paging/Directory   See signed in provider for up to date coverage information  ______________________________________________________________________    Interval History   Reported some blood with stool today  Still feels functional capacity reduced    Physical Exam   Vital Signs: Temp: 99.4  F (37.4  C) Temp src: Oral BP: 123/65 Pulse: 75   Resp: 16 SpO2: 95 % O2 Device: None (Room air)    Weight: 0 lbs 0 oz    General Appearance: alert and oriented  Respiratory: ctab  Cardiovascular: rrr  GI: soft nt  Skin: wwp  Other:      Medical Decision Making             Data

## 2023-10-12 NOTE — PROGRESS NOTES
VS: BP (!) 193/93 (BP Location: Right arm, Patient Position: Supine, Cuff Size: Adult Regular)   Pulse 61   Temp 98.6  F (37  C) (Oral)   Resp 16   SpO2 94%    O2: 94% on RA, no SOB or chest pain reported   Output: Voiding via purewick in bed, using bedpan for BM   Last BM: 10/12, small amount of blood noted, provider notified   Activity: Up with assist x1-2 with liko lift, tolerating well   Skin: No skin issues of note   Pain: No pain reported this shift   CMS: A&O x4, intermittent confusion and slowness to answer   Dressing: No dressings ar this time   Diet: Soft bite sized diet   LDA: L PIV infusing LR @ 75mL/hr, tolerating well   Equipment: IV pole, liko lift   Plan: Continue POC   Additional Info: Pt. Takes all medications by mouth crush in applesauce. All solution medications should also be given in applesauce or should be placed in water/juice and thickened. Pt also has daily radiation appointments that require transport set up by RN. All appointments have been placed in the treatment team sticky notes. Please add when rides have been set up. Pts 10/13 ride is already set. Pt also has a PRN nebulizer that her  would like administered when available while awake. Pt coughed up mucus plug early in shift leading to this. Nebulizer is available Q6Hrs. Finally, pt and  would like pt to be in bed between 1930 and 2000 so the pt can get ready for bed.

## 2023-10-12 NOTE — PROGRESS NOTES
Care Management Follow Up    Length of Stay (days): 5    Expected Discharge Date: 10/09/2023     Concerns to be Addressed: discharge planning     Patient plan of care discussed at interdisciplinary rounds: Yes    Anticipated Discharge Disposition:  TBD     Anticipated Discharge Services:  TBD  Anticipated Discharge DME:      Patient/family educated on Medicare website which has current facility and service quality ratings:  NA  Education Provided on the Discharge Plan:  yes  Patient/Family in Agreement with the Plan:      Referrals Placed by CM/SW:    Private pay costs discussed: Not applicable    Additional Information:    Met with patient and her spouse in her room.  Discussed that OT will be coming in to do an eval for need for TCU.  He does have notes from an outside agency that were doing OT and PT with her prior to this hospitalization.  He feels these show that she is not at current baseline.  He will present to therapy when they arrive.    OT eval has been changed to TCU rec's or home with assist if staff able to transfer with assist of two.  This is not something they can do per [facility].  Discussed with spouse and [facility] is the only one she can go to with Radiation therapy.  Faxed outside PT/OT notes to HIM to add to chart.  They show patients prior baseline.    Plan is to send a new referral to [facility] when scanned therapy notes are in media tab in chart so they can do a full review.    2:21 PM  Outside PT/OT notes that I sent to HIM today are in media tab now.  New referral sent to [facility] to re-evaluate for admission.    Paged Dr. Odom to update.    Notified Alana at [facility].      4:09 PM  Call from  TCU liaison.  She does not feel she is appropriate for [facility] at this time.  She felt that there should be a palliative care discussion with Olga and her .    RNCC to follow up with patient,  and Dr. Yañez tomorrow.     Contacts for patient:    [facility]  [address]  [place] [address]  [phone]     [facility]  [address] [place] [address] [phone] (Alana RN)      Gabriela Ly, RN, BA  Care Coordinator  6 Med Surg- 10 ICU beds 1037 & 1039  [phone], pager [phone]      For weekend social work needs, contact information below and available on [facility]:     SW Weekend [place] Pager ED, 5 MS, 5 ortho : [phone]  SW Weekend 6MS, 8A, 10ICU- Pager: [phone]     For weekend RN care coordinator needs (home discharge with needs including home care, assisted living facility returns, durable medical equip, IV antibiotics) - page [phone]

## 2023-10-12 NOTE — PLAN OF CARE
9850 - 8604    Goal Outcome Evaluation:  Plan of Care Reviewed With: spouse, patient  Overall Patient Progress: improving    VS: Temp: (P) 98.3  F (36.8  C) Temp src: (P) Axillary BP: (!) (P) 144/80 Pulse: (P) 67   Resp: (P) 16 SpO2: (P) 94 % O2 Device: (P) None (Room air)     O2: stable on RA. LS clear and equal bilaterally.   Denies chest pain and SOB.    Output: Uses purewick, yellowish output.    Last BM: denies abdominal discomfort.    Activity: Up with Assist   Skin: Visible skin intact   Pain: Pain managed with scheduled medication   CMS: Intact, AOx4.  pt able to make needs known.    Diet: Soft & Bite Sized Diet (level 6) Thin Liquids (level 0)     LDA: L PIV infusing LR at 75 mL/hr   Equipment: IV pole, personal belongings,    Plan: Continue with plan of care. Call light within reach,

## 2023-10-13 ENCOUNTER — APPOINTMENT (OUTPATIENT)
Dept: RADIATION ONCOLOGY | Facility: CLINIC | Age: 78
End: 2023-10-13
Attending: STUDENT IN AN ORGANIZED HEALTH CARE EDUCATION/TRAINING PROGRAM
Payer: MEDICARE

## 2023-10-13 ENCOUNTER — APPOINTMENT (OUTPATIENT)
Dept: OCCUPATIONAL THERAPY | Facility: CLINIC | Age: 78
DRG: 054 | End: 2023-10-13
Attending: STUDENT IN AN ORGANIZED HEALTH CARE EDUCATION/TRAINING PROGRAM
Payer: MEDICARE

## 2023-10-13 LAB
ANION GAP SERPL CALCULATED.3IONS-SCNC: 13 MMOL/L (ref 7–15)
BUN SERPL-MCNC: 16.7 MG/DL (ref 8–23)
CALCIUM SERPL-MCNC: 9.2 MG/DL (ref 8.8–10.2)
CHLORIDE SERPL-SCNC: 103 MMOL/L (ref 98–107)
CREAT SERPL-MCNC: 0.88 MG/DL (ref 0.51–0.95)
DEPRECATED HCO3 PLAS-SCNC: 24 MMOL/L (ref 22–29)
EGFRCR SERPLBLD CKD-EPI 2021: 67 ML/MIN/1.73M2
ERYTHROCYTE [DISTWIDTH] IN BLOOD BY AUTOMATED COUNT: 12.8 % (ref 10–15)
GLUCOSE SERPL-MCNC: 97 MG/DL (ref 70–99)
HCT VFR BLD AUTO: 33.2 % (ref 35–47)
HGB BLD-MCNC: 11.1 G/DL (ref 11.7–15.7)
MAGNESIUM SERPL-MCNC: 1.9 MG/DL (ref 1.7–2.3)
MCH RBC QN AUTO: 30.3 PG (ref 26.5–33)
MCHC RBC AUTO-ENTMCNC: 33.4 G/DL (ref 31.5–36.5)
MCV RBC AUTO: 91 FL (ref 78–100)
PHOSPHATE SERPL-MCNC: 3.2 MG/DL (ref 2.5–4.5)
PLATELET # BLD AUTO: 232 10E3/UL (ref 150–450)
POTASSIUM SERPL-SCNC: 3.9 MMOL/L (ref 3.4–5.3)
RBC # BLD AUTO: 3.66 10E6/UL (ref 3.8–5.2)
SODIUM SERPL-SCNC: 140 MMOL/L (ref 135–145)
WBC # BLD AUTO: 12.9 10E3/UL (ref 4–11)

## 2023-10-13 PROCEDURE — 36415 COLL VENOUS BLD VENIPUNCTURE: CPT | Performed by: STUDENT IN AN ORGANIZED HEALTH CARE EDUCATION/TRAINING PROGRAM

## 2023-10-13 PROCEDURE — 36415 COLL VENOUS BLD VENIPUNCTURE: CPT | Performed by: INTERNAL MEDICINE

## 2023-10-13 PROCEDURE — 94640 AIRWAY INHALATION TREATMENT: CPT | Mod: 76

## 2023-10-13 PROCEDURE — 77386 HC IMRT TREATMENT DELIVERY, COMPLEX: CPT | Performed by: STUDENT IN AN ORGANIZED HEALTH CARE EDUCATION/TRAINING PROGRAM

## 2023-10-13 PROCEDURE — 250N000009 HC RX 250: Performed by: STUDENT IN AN ORGANIZED HEALTH CARE EDUCATION/TRAINING PROGRAM

## 2023-10-13 PROCEDURE — 85027 COMPLETE CBC AUTOMATED: CPT | Performed by: INTERNAL MEDICINE

## 2023-10-13 PROCEDURE — 82310 ASSAY OF CALCIUM: CPT | Performed by: INTERNAL MEDICINE

## 2023-10-13 PROCEDURE — 258N000003 HC RX IP 258 OP 636: Performed by: STUDENT IN AN ORGANIZED HEALTH CARE EDUCATION/TRAINING PROGRAM

## 2023-10-13 PROCEDURE — 83735 ASSAY OF MAGNESIUM: CPT | Performed by: STUDENT IN AN ORGANIZED HEALTH CARE EDUCATION/TRAINING PROGRAM

## 2023-10-13 PROCEDURE — 97530 THERAPEUTIC ACTIVITIES: CPT | Mod: GO

## 2023-10-13 PROCEDURE — 250N000011 HC RX IP 250 OP 636: Mod: JZ | Performed by: STUDENT IN AN ORGANIZED HEALTH CARE EDUCATION/TRAINING PROGRAM

## 2023-10-13 PROCEDURE — C9254 INJECTION, LACOSAMIDE: HCPCS | Performed by: STUDENT IN AN ORGANIZED HEALTH CARE EDUCATION/TRAINING PROGRAM

## 2023-10-13 PROCEDURE — 250N000013 HC RX MED GY IP 250 OP 250 PS 637: Performed by: STUDENT IN AN ORGANIZED HEALTH CARE EDUCATION/TRAINING PROGRAM

## 2023-10-13 PROCEDURE — 94640 AIRWAY INHALATION TREATMENT: CPT

## 2023-10-13 PROCEDURE — 120N000002 HC R&B MED SURG/OB UMMC

## 2023-10-13 PROCEDURE — 250N000009 HC RX 250

## 2023-10-13 PROCEDURE — 84100 ASSAY OF PHOSPHORUS: CPT | Performed by: STUDENT IN AN ORGANIZED HEALTH CARE EDUCATION/TRAINING PROGRAM

## 2023-10-13 RX ORDER — ALBUTEROL SULFATE 0.83 MG/ML
SOLUTION RESPIRATORY (INHALATION)
Status: COMPLETED
Start: 2023-10-13 | End: 2023-10-13

## 2023-10-13 RX ADMIN — SULFAMETHOXAZOLE AND TRIMETHOPRIM 1 TABLET: 800; 160 TABLET ORAL at 09:39

## 2023-10-13 RX ADMIN — DEXAMETHASONE SODIUM PHOSPHATE 4 MG: 10 INJECTION, SOLUTION INTRAMUSCULAR; INTRAVENOUS at 09:40

## 2023-10-13 RX ADMIN — SENNOSIDES AND DOCUSATE SODIUM 1 TABLET: 50; 8.6 TABLET ORAL at 09:39

## 2023-10-13 RX ADMIN — Medication 40 MG: at 18:12

## 2023-10-13 RX ADMIN — DEXAMETHASONE SODIUM PHOSPHATE 4 MG: 10 INJECTION, SOLUTION INTRAMUSCULAR; INTRAVENOUS at 20:53

## 2023-10-13 RX ADMIN — SODIUM CHLORIDE, POTASSIUM CHLORIDE, SODIUM LACTATE AND CALCIUM CHLORIDE: 600; 310; 30; 20 INJECTION, SOLUTION INTRAVENOUS at 21:19

## 2023-10-13 RX ADMIN — IPRATROPIUM BROMIDE AND ALBUTEROL SULFATE 3 ML: 2.5; .5 SOLUTION RESPIRATORY (INHALATION) at 12:37

## 2023-10-13 RX ADMIN — SODIUM CHLORIDE, POTASSIUM CHLORIDE, SODIUM LACTATE AND CALCIUM CHLORIDE: 600; 310; 30; 20 INJECTION, SOLUTION INTRAVENOUS at 04:24

## 2023-10-13 RX ADMIN — LACOSAMIDE 100 MG: 10 INJECTION INTRAVENOUS at 10:09

## 2023-10-13 RX ADMIN — LACOSAMIDE 100 MG: 10 INJECTION INTRAVENOUS at 22:14

## 2023-10-13 RX ADMIN — FLUOXETINE HYDROCHLORIDE 40 MG: 20 SOLUTION ORAL at 09:46

## 2023-10-13 RX ADMIN — SENNOSIDES AND DOCUSATE SODIUM 1 TABLET: 50; 8.6 TABLET ORAL at 20:53

## 2023-10-13 RX ADMIN — AMLODIPINE BESYLATE 5 MG: 5 TABLET ORAL at 09:38

## 2023-10-13 RX ADMIN — LEVETIRACETAM 1250 MG: 100 SOLUTION ORAL at 09:46

## 2023-10-13 RX ADMIN — LEVETIRACETAM 1250 MG: 100 SOLUTION ORAL at 20:53

## 2023-10-13 RX ADMIN — THERA TABS 1 TABLET: TAB at 09:38

## 2023-10-13 RX ADMIN — ALBUTEROL SULFATE 2.5 MG: 2.5 SOLUTION RESPIRATORY (INHALATION) at 22:04

## 2023-10-13 RX ADMIN — Medication 40 MG: at 10:09

## 2023-10-13 RX ADMIN — RIVAROXABAN 20 MG: 10 TABLET, FILM COATED ORAL at 18:12

## 2023-10-13 RX ADMIN — BUSPIRONE HYDROCHLORIDE 30 MG: 30 TABLET ORAL at 09:39

## 2023-10-13 RX ADMIN — BUSPIRONE HYDROCHLORIDE 30 MG: 30 TABLET ORAL at 20:53

## 2023-10-13 ASSESSMENT — ACTIVITIES OF DAILY LIVING (ADL)
ADLS_ACUITY_SCORE: 53

## 2023-10-13 NOTE — PROGRESS NOTES
Care Management Follow Up    Length of Stay (days): 6    Expected Discharge Date: 10/09/2023     Concerns to be Addressed: discharge planning     Patient plan of care discussed at interdisciplinary rounds: Yes    Anticipated Discharge Disposition:  TBD - LTC vs. TCU vs. Return to [facility]     Anticipated Discharge Services:  TBD  Anticipated Discharge DME:  TBD    Patient/family educated on Medicare website which has current facility and service quality ratings:  NA  Education Provided on the Discharge Plan:  NA  Patient/Family in Agreement with the Plan:  NA    Referrals Placed by CM/SW:  NA  Private pay costs discussed: Not applicable    Additional Information:  RNCC notified that pt is medically ready to discharge from inpatient. Current recs are TCU as pt is assist x2 and must [facility]. PT eval ordered and will be completed tomorrow morning as pt is in radiation therapy this afternoon. Another complicating factor is that not all TCUs will accept a pt on active radiation treatment. Plan is 10 rad treatments at this time.     Once final recommendations from therapy complete, pt and spouse would likely benefit from a care conference to discuss goals of care and anticipated likelihood of return to previous functional level. Care management will continue to follow and assist with discharge planning.       Michelle Lopez, ELEANOR   Nurse Coordinator    Covering for: 6 M/S  Phone: [phone]    Social Work and Care Management Department       SEARCHABLE in [facility] - search CARE COORDINATOR       [place] ([phone]) Saturday & Sunday; ([phone]) [facility] Recognized Holidays     Units: 5A, 5B & 5C  Pager: [phone]    Units: 6B, 6C & 6D    Pager: [phone]    Units: 7A, 7B, 7C & 7D    Pager: [phone]    Units: 6A & ICU   Pager: [phone]    Units: 5 Ortho, 5MS & WB ED Pager: [phone]    Units: 6MS, 8A & 10 ICU  Pager [phone]

## 2023-10-13 NOTE — PROGRESS NOTES
[facility]    Medicine Progress Note - Hospitalist Service, GOLD TEAM 21    Date of Admission:  10/6/2023    Assessment & Plan   Olga Bailey is a 78 year old female admitted on 10/6/2023. She has history of L frontoparietal glioblastoma s/p resection,chemotherapy and radiation with remission in 2021, recently found to have recurrence in September 2023, with baseline cognitive and functional impairment, h/o seizures, h/o DVT on xarelto, HTN, and depression who presented 10/6/23 with acute worsening of baseline aphasia as well as new facial twitching. Admitted to internal medicine for further work up and management.     Updates  [facility]  -I spoke with [facility] at [phone], who believe pt is at her baseline  -however, I brought to their attention the medical documentation from her outside facility that she walked 40 feet twice on 10/5. She is currently unable to stand without assistance, which is markedly below this functional level from 8 days ago.  -PT note on 10/10 noted, recommending to defer placement recs to OT. [facility] requested repeat PT evaluation to inform TCU indication, and repeat PT referral placed  -started RT here for glioblastoma     Partial Seizure: previously controlled  Worsened chronic aphasia   Difficulty Swallowing  Presented with acute (< 1 day) worsening of aphasia and new facial twitching with abnormal mouth movements concerning for partial seizure.  She underwent stroke eval in the ED which was reassuring against acute CVA,   Given recent recurrence of glioblastoma, there was concern for spread or advance of disease, though both CT and MRI were reassuring to stability of current malignancy from last imaging (9/2023).  No metabolic derangements to explain acute change, and norm WBC with no focal findings was reassuring against acute infection.  Neurology consulted and felt movements were  consistent with partial seizure, lacosamide was added, though subsequent EEG showed some degree of persistent seizure activity, and further brief clinical seizures were observed.  Neurologist discussed risk/benefit of a possible third antiepileptic medication (which could carry a risk of heavier sedation), the family elected to remain on the two current medications for quality of life reasons.  - Neurology consulted, appreciate thoughtful input  - Continue Keppra 1250 mg BID   - Lacosamide 200mg loading dose (complete) then 100mg BID   - will transition to PO prior to discharge, same dose   - Could increase to 150mg BID if increased seizure burden but stable on current dosing   - May reconsider third agent should seizure burden change drastically (ie Grand Mal, etc)   - Ativan PRN for any seizure activity > 2 minutes or increased clusters of seizure like activity   - Seizure precautions   - Neuro checks     H/o L frontoparietal glioblastoma s/p resection, chemotherapy and radiation (completed May 2021), with recurrence of malignancy 2023   Cognitive and functional impairment due to glioblastoma and chemoradiation   Seen by oncology on this visit, repeat imaging stable from September. Started on trial of steroid, will eval symptom improvement. Patient has developed worsening difficulty with swallowing, seems to have become an issue over the past few days, per patient (started prior to steroid).   - Oncology consulted, appreciate input  - Started dexamethasone 4mg BID  (10/9)   - Onc following, expect one week at current dose, then possible taper.    - TMP-SMX daily for PCP PPX (pt had hx of PJP)   - Rad/Onc consulted, appreciate thoughtful case review.   - PT/OT consulted, appreciate input.      Likely aspiration pneumonitis  Coughing with medications, significant water suctioned.  Felt a little short of breath afterwords, which she felt improved with a duoneb.  On exam, she does have scattered bilateral wheezes  present.   - DuoNeb QID PRN   - SLP consult completed, appreciate input. Soft diet level 6 with thin liquids.    Depression   - Continue PTA Buspirone and Fluoxetine     H/o DVT   - Continue PTA Xarelto     HTN   - Continue PTA Amlodipine     Hemorrhoids   -topical preparation H  -monitor           Diet: Soft & Bite Sized Diet (level 6) Thin Liquids (level 0)    DVT Prophylaxis: DOAC  Martino Catheter: Not present  Lines: None     Cardiac Monitoring: None  Code Status: Full Code      Clinically Significant Risk Factors                  # Hypertension: Noted on problem list                   Disposition Plan             Tommy Odom MD  Hospitalist Service, GOLD TEAM 21  [facility]  Securely message with [employer] (more info)  Text page via [employer] Paging/Directory   See signed in provider for up to date coverage information  ______________________________________________________________________    Interval History   No new events  Remains markedly debilitated from baseline    Physical Exam   Vital Signs: Temp: 98.8  F (37.1  C) Temp src: Oral BP: (!) 145/80 Pulse: 66   Resp: 16 SpO2: 97 % O2 Device: None (Room air)    Weight: 0 lbs 0 oz    General Appearance: Alert and disoriented  Respiratory: clear  Cardiovascular: regular  GI: soft  Skin: wwp  Other: Moving four extremities     Medical Decision Making       302 MINUTES SPENT BY ME on the date of service doing chart review, history, exam, documentation & further activities per the note.      Data

## 2023-10-13 NOTE — PROGRESS NOTES
1900 - 0700    General Data:  Awake, alert, oriented x 4, not in distress, able to make needs known  Diet: Soft & Bite Sized Diet (level 6) Thin Liquids (level 0)    Activity: A2 with lift    Vital signs: Temp: 98.3  F (36.8  C) Temp src: Axillary BP: 130/73 Pulse: 66   Resp: 16 SpO2: 95 % O2 Device: None (Room air)      Skin: Skin intact, warm to touch, good skin turgor, L PIV LR x 75 ml/hr  Chest and Lungs: Normal pulmonary effort, not in respiratory distress. Denies / SOB  Heart: Normal rate and rhythm, no murmur   Gastrointestinal:  Soft, non - distended, No abdominal tenderness, guarding. Last BM 10/12/2023  Genitourinary: Incontinent. Use Purewick, draines to a yellowish colored output. Denies dysuria or urinary concerns.  Extremities:  observed foot drop and R sided weakness.  No gross deformities, peripheral edema, joint swelling.     Pain: Denies    Other:   > Radiation therapy at 1:45 am today (10/13/14)  > No significant event. Sleep throughout the shift.

## 2023-10-14 ENCOUNTER — APPOINTMENT (OUTPATIENT)
Dept: PHYSICAL THERAPY | Facility: CLINIC | Age: 78
DRG: 054 | End: 2023-10-14
Attending: STUDENT IN AN ORGANIZED HEALTH CARE EDUCATION/TRAINING PROGRAM
Payer: MEDICARE

## 2023-10-14 LAB
ANION GAP SERPL CALCULATED.3IONS-SCNC: 11 MMOL/L (ref 7–15)
BUN SERPL-MCNC: 17.1 MG/DL (ref 8–23)
CALCIUM SERPL-MCNC: 9.2 MG/DL (ref 8.8–10.2)
CHLORIDE SERPL-SCNC: 104 MMOL/L (ref 98–107)
CREAT SERPL-MCNC: 0.79 MG/DL (ref 0.51–0.95)
DEPRECATED HCO3 PLAS-SCNC: 25 MMOL/L (ref 22–29)
EGFRCR SERPLBLD CKD-EPI 2021: 76 ML/MIN/1.73M2
ERYTHROCYTE [DISTWIDTH] IN BLOOD BY AUTOMATED COUNT: 13.1 % (ref 10–15)
GLUCOSE SERPL-MCNC: 109 MG/DL (ref 70–99)
HCT VFR BLD AUTO: 29.5 % (ref 35–47)
HGB BLD-MCNC: 9.6 G/DL (ref 11.7–15.7)
MAGNESIUM SERPL-MCNC: 1.8 MG/DL (ref 1.7–2.3)
MCH RBC QN AUTO: 29.8 PG (ref 26.5–33)
MCHC RBC AUTO-ENTMCNC: 32.5 G/DL (ref 31.5–36.5)
MCV RBC AUTO: 92 FL (ref 78–100)
PHOSPHATE SERPL-MCNC: 3.7 MG/DL (ref 2.5–4.5)
PLATELET # BLD AUTO: 213 10E3/UL (ref 150–450)
POTASSIUM SERPL-SCNC: 3.8 MMOL/L (ref 3.4–5.3)
RBC # BLD AUTO: 3.22 10E6/UL (ref 3.8–5.2)
SODIUM SERPL-SCNC: 140 MMOL/L (ref 135–145)
WBC # BLD AUTO: 8.8 10E3/UL (ref 4–11)

## 2023-10-14 PROCEDURE — 97162 PT EVAL MOD COMPLEX 30 MIN: CPT | Mod: GP

## 2023-10-14 PROCEDURE — 250N000013 HC RX MED GY IP 250 OP 250 PS 637: Performed by: STUDENT IN AN ORGANIZED HEALTH CARE EDUCATION/TRAINING PROGRAM

## 2023-10-14 PROCEDURE — 999N000157 HC STATISTIC RCP TIME EA 10 MIN

## 2023-10-14 PROCEDURE — 94640 AIRWAY INHALATION TREATMENT: CPT | Mod: 76

## 2023-10-14 PROCEDURE — C9254 INJECTION, LACOSAMIDE: HCPCS | Performed by: STUDENT IN AN ORGANIZED HEALTH CARE EDUCATION/TRAINING PROGRAM

## 2023-10-14 PROCEDURE — 80048 BASIC METABOLIC PNL TOTAL CA: CPT | Performed by: INTERNAL MEDICINE

## 2023-10-14 PROCEDURE — 120N000002 HC R&B MED SURG/OB UMMC

## 2023-10-14 PROCEDURE — 250N000009 HC RX 250: Performed by: PEDIATRICS

## 2023-10-14 PROCEDURE — 85027 COMPLETE CBC AUTOMATED: CPT | Performed by: INTERNAL MEDICINE

## 2023-10-14 PROCEDURE — 97530 THERAPEUTIC ACTIVITIES: CPT | Mod: GP

## 2023-10-14 PROCEDURE — 250N000009 HC RX 250

## 2023-10-14 PROCEDURE — 84100 ASSAY OF PHOSPHORUS: CPT | Performed by: STUDENT IN AN ORGANIZED HEALTH CARE EDUCATION/TRAINING PROGRAM

## 2023-10-14 PROCEDURE — 258N000003 HC RX IP 258 OP 636: Performed by: STUDENT IN AN ORGANIZED HEALTH CARE EDUCATION/TRAINING PROGRAM

## 2023-10-14 PROCEDURE — 250N000011 HC RX IP 250 OP 636: Mod: JZ | Performed by: STUDENT IN AN ORGANIZED HEALTH CARE EDUCATION/TRAINING PROGRAM

## 2023-10-14 PROCEDURE — 36415 COLL VENOUS BLD VENIPUNCTURE: CPT | Performed by: INTERNAL MEDICINE

## 2023-10-14 PROCEDURE — 83735 ASSAY OF MAGNESIUM: CPT | Performed by: STUDENT IN AN ORGANIZED HEALTH CARE EDUCATION/TRAINING PROGRAM

## 2023-10-14 PROCEDURE — 94640 AIRWAY INHALATION TREATMENT: CPT

## 2023-10-14 RX ORDER — ALBUTEROL SULFATE 0.83 MG/ML
SOLUTION RESPIRATORY (INHALATION)
Status: COMPLETED
Start: 2023-10-14 | End: 2023-10-14

## 2023-10-14 RX ORDER — IPRATROPIUM BROMIDE AND ALBUTEROL SULFATE 2.5; .5 MG/3ML; MG/3ML
3 SOLUTION RESPIRATORY (INHALATION)
Status: DISCONTINUED | OUTPATIENT
Start: 2023-10-14 | End: 2023-10-18

## 2023-10-14 RX ORDER — CODEINE PHOSPHATE AND GUAIFENESIN 10; 100 MG/5ML; MG/5ML
5 SOLUTION ORAL EVERY 4 HOURS PRN
Status: DISCONTINUED | OUTPATIENT
Start: 2023-10-14 | End: 2023-11-28 | Stop reason: HOSPADM

## 2023-10-14 RX ADMIN — Medication 40 MG: at 11:07

## 2023-10-14 RX ADMIN — FLUOXETINE HYDROCHLORIDE 40 MG: 20 SOLUTION ORAL at 11:07

## 2023-10-14 RX ADMIN — Medication 40 MG: at 18:49

## 2023-10-14 RX ADMIN — AMLODIPINE BESYLATE 5 MG: 5 TABLET ORAL at 11:09

## 2023-10-14 RX ADMIN — SENNOSIDES AND DOCUSATE SODIUM 1 TABLET: 50; 8.6 TABLET ORAL at 11:09

## 2023-10-14 RX ADMIN — BUSPIRONE HYDROCHLORIDE 30 MG: 30 TABLET ORAL at 11:09

## 2023-10-14 RX ADMIN — DEXAMETHASONE SODIUM PHOSPHATE 4 MG: 10 INJECTION, SOLUTION INTRAMUSCULAR; INTRAVENOUS at 20:09

## 2023-10-14 RX ADMIN — ALBUTEROL SULFATE 2.5 MG: 2.5 SOLUTION RESPIRATORY (INHALATION) at 03:00

## 2023-10-14 RX ADMIN — SENNOSIDES AND DOCUSATE SODIUM 1 TABLET: 50; 8.6 TABLET ORAL at 20:10

## 2023-10-14 RX ADMIN — BUSPIRONE HYDROCHLORIDE 30 MG: 30 TABLET ORAL at 20:10

## 2023-10-14 RX ADMIN — THERA TABS 1 TABLET: TAB at 11:09

## 2023-10-14 RX ADMIN — IPRATROPIUM BROMIDE AND ALBUTEROL SULFATE 3 ML: 2.5; .5 SOLUTION RESPIRATORY (INHALATION) at 09:23

## 2023-10-14 RX ADMIN — SULFAMETHOXAZOLE AND TRIMETHOPRIM 1 TABLET: 800; 160 TABLET ORAL at 11:09

## 2023-10-14 RX ADMIN — LEVETIRACETAM 1250 MG: 100 SOLUTION ORAL at 11:07

## 2023-10-14 RX ADMIN — IPRATROPIUM BROMIDE AND ALBUTEROL SULFATE 3 ML: 2.5; .5 SOLUTION RESPIRATORY (INHALATION) at 21:42

## 2023-10-14 RX ADMIN — LACOSAMIDE 100 MG: 10 INJECTION INTRAVENOUS at 10:52

## 2023-10-14 RX ADMIN — DEXAMETHASONE SODIUM PHOSPHATE 4 MG: 10 INJECTION, SOLUTION INTRAMUSCULAR; INTRAVENOUS at 11:08

## 2023-10-14 RX ADMIN — IPRATROPIUM BROMIDE AND ALBUTEROL SULFATE 3 ML: 2.5; .5 SOLUTION RESPIRATORY (INHALATION) at 15:02

## 2023-10-14 RX ADMIN — LEVETIRACETAM 1250 MG: 100 SOLUTION ORAL at 20:10

## 2023-10-14 RX ADMIN — RIVAROXABAN 20 MG: 10 TABLET, FILM COATED ORAL at 18:46

## 2023-10-14 RX ADMIN — LACOSAMIDE 100 MG: 10 INJECTION INTRAVENOUS at 20:18

## 2023-10-14 ASSESSMENT — ACTIVITIES OF DAILY LIVING (ADL)
ADLS_ACUITY_SCORE: 57
ADLS_ACUITY_SCORE: 57
ADLS_ACUITY_SCORE: 51
ADLS_ACUITY_SCORE: 51
ADLS_ACUITY_SCORE: 59
ADLS_ACUITY_SCORE: 57
ADLS_ACUITY_SCORE: 51
ADLS_ACUITY_SCORE: 59
ADLS_ACUITY_SCORE: 57
ADLS_ACUITY_SCORE: 51

## 2023-10-14 NOTE — PLAN OF CARE
Pt is alert, able to make needs known. On soft bite size diet, take medications crushed with apple sauce. Assist of 2 with lift. Right PIV patent, infusing with LR at 75ml/hour. Purewick in place. Seizure precaution, bed rails pad on for protection. Repositioning done. No significant changes this shift. Per RT pt's  requested to switch PRN Neb to scheduled. Provider paged. Call light is within reach. Continue with POC.

## 2023-10-14 NOTE — PLAN OF CARE
Occupational Therapy: Orders received. Chart reviewed and discussed with care team.? Occupational Therapy not indicated due to pt receives assist with ADLs at baseline. PT working with pt, OT to defer at this time.? Defer discharge recommendations to PT and care team.? Will complete orders.

## 2023-10-14 NOTE — PROGRESS NOTES
[facility]    Medicine Progress Note - Hospitalist Service, GOLD TEAM 21    Date of Admission:  10/6/2023    Assessment & Plan   Olga Bailey is a 78 year old female admitted on 10/6/2023. She has history of L frontoparietal glioblastoma s/p resection,chemotherapy and radiation with remission in 2021, recently found to have recurrence in September 2023, with baseline cognitive and functional impairment, h/o seizures, h/o DVT on xarelto, HTN, and depression who presented 10/6/23 with acute worsening of baseline aphasia as well as new facial twitching. Admitted to internal medicine for further work up and management.     Updates  -awaiting updated dispo recs from PT.   -started RT here for glioblastoma   -coughing improving with nebs    Partial Seizure: previously controlled  Worsened chronic aphasia   Difficulty Swallowing  Presented with acute (< 1 day) worsening of aphasia and new facial twitching with abnormal mouth movements concerning for partial seizure.  She underwent stroke eval in the ED which was reassuring against acute CVA,   Given recent recurrence of glioblastoma, there was concern for spread or advance of disease, though both CT and MRI were reassuring to stability of current malignancy from last imaging (9/2023).  No metabolic derangements to explain acute change, and norm WBC with no focal findings was reassuring against acute infection.  Neurology consulted and felt movements were consistent with partial seizure, lacosamide was added, though subsequent EEG showed some degree of persistent seizure activity, and further brief clinical seizures were observed.  Neurologist discussed risk/benefit of a possible third antiepileptic medication (which could carry a risk of heavier sedation), the family elected to remain on the two current medications for quality of life reasons.  - Neurology consulted, appreciate thoughtful input  - Continue Keppra 1250 mg  BID   - Lacosamide 200mg loading dose (complete) then 100mg BID   - will transition to PO prior to discharge, same dose   - Could increase to 150mg BID if increased seizure burden but stable on current dosing   - May reconsider third agent should seizure burden change drastically (ie Grand Mal, etc)   - Ativan PRN for any seizure activity > 2 minutes or increased clusters of seizure like activity   - Seizure precautions   - Neuro checks     H/o L frontoparietal glioblastoma s/p resection, chemotherapy and radiation (completed May 2021), with recurrence of malignancy 2023   Cognitive and functional impairment due to glioblastoma and chemoradiation   Seen by oncology on this visit, repeat imaging stable from September. Started on trial of steroid, will eval symptom improvement. Patient has developed worsening difficulty with swallowing, seems to have become an issue over the past few days, per patient (started prior to steroid).   - Oncology consulted, appreciate input  - Started dexamethasone 4mg BID  (10/9)   - Onc following, expect one week at current dose, then possible taper.    - TMP-SMX daily for PCP PPX (pt had hx of PJP)   - Rad/Onc consulted, appreciate thoughtful case review.   - PT/OT consulted, appreciate input.      Likely aspiration pneumonitis  Coughing with medications, significant water suctioned.  Felt a little short of breath afterwords, which she felt improved with a duoneb.  On exam, she does have scattered bilateral wheezes present.   - DuoNeb QID PRN   - SLP consult completed, appreciate input. Soft diet level 6 with thin liquids.    Depression   - Continue PTA Buspirone and Fluoxetine     H/o DVT   - Continue PTA Xarelto     HTN   - Continue PTA Amlodipine     Hemorrhoids   -topical preparation H  -monitor           Diet: Soft & Bite Sized Diet (level 6) Thin Liquids (level 0)    DVT Prophylaxis: DOAC  Martino Catheter: Not present  Lines: None     Cardiac Monitoring: None  Code Status: Full  Code      Clinically Significant Risk Factors                  # Hypertension: Noted on problem list                   Disposition Plan             Tommy Odom MD  Hospitalist Service, GOLD TEAM 21  [facility]  Securely message with [employer] (more info)  Text page via [employer] Paging/Directory   See signed in provider for up to date coverage information  ______________________________________________________________________    Interval History   No events  Coughing responding to nebs    Physical Exam   Vital Signs: Temp: 99.3  F (37.4  C) Temp src: Oral BP: 129/60 Pulse: 73   Resp: 24 SpO2: 93 % O2 Device: None (Room air)    Weight: 0 lbs 0 oz    General Appearance: A&ox3 in nad  Respiratory: ctab  Cardiovascular: rrr  GI: soft nt  Skin: wwp  Other: No meena     Medical Decision Making       30 MINUTES SPENT BY ME on the date of service doing chart review, history, exam, documentation & further activities per the note.      Data

## 2023-10-14 NOTE — PLAN OF CARE
[id number]    Patient progressing slowly. Was able to help self w/ dinner this afternoon/evening. Patient was seen at [facility] for radiation treatment this afternoon. Patient has slight hemorrhoids noted after LG BM- prep H ointment applied which helps with discomfort. Patient denies pain otherwise during shift.

## 2023-10-14 NOTE — PROGRESS NOTES
[facility]  SPECIALIZING IN BREAKTHROUGHS  Radiation Oncology    On Treatment Visit Note      Olga Bailey      Date: Oct 12, 2023   MRN: [id number]   : 1945         Reason for Visit:  On Radiation Treatment Visit     Treatment Summary to Date   LtFrontReTx   350 cGy   Total dose 3500 Gy          Subjective:   Olga is feeling well today. She arrived from [facility].       Objective:   There were no vitals taken for this visit.  Gen: Appears well, in no acute distress  Skin: No erythema    Labs:  CBC RESULTS: Recent Labs   Lab Test 10/14/23  0731   WBC 8.8   RBC 3.22*   HGB 9.6*   HCT 29.5*   MCV 92   MCH 29.8   MCHC 32.5   RDW 13.1        ELECTROLYTES:  Recent Labs   Lab Test 10/14/23  0731      POTASSIUM 3.8   CHLORIDE 104   ESTIVEN 9.2   CO2 25   BUN 17.1   CR 0.79   *       Assessment:    Tolerating radiation therapy well.  All questions and concerns addressed.    Toxicities:  None    Plan:   1. Continue current therapy.  Can continue as outpt when discharged.       Mosaiq chart and setup information reviewed  MVCT reviewed        Betsy Spann MD, PhD     Department of Radiation Oncology  [facility]

## 2023-10-14 NOTE — PROGRESS NOTES
"   10/14/23 1100   Appointment Info   Signing Clinician's Name / Credentials (PT) Jailyn Lopez DPT   Rehab Comments (PT)  present for evaluation   Living Environment   People in Home alone  ( lives in own home still; 7 days/wk during the day time spouse is w/ pt at her apt but did not give specific hours he is there just states \"during the day light I am there\")   Current Living Arrangements assisted living  (though pt states \"nursing home\" does not recall name of it. Spouse states [place] in [place])   Home Accessibility no concerns   Transportation Anticipated health plan transportation   Living Environment Comments from [facility] apt alone;  there during daytime hours but d/t confusion still present anticipate pt would benefit from 24/7 supervision   Self-Care   Usual Activity Tolerance moderate  (EZ stand lift w/ Ax1-2 baseline nursing transfer for months per spouse; spouse completes SPT w/ walker and pt at times; pt has only been amb w/ home therapy and this has not been functional amb as modA to stand and mod-maxA for amb for 10ft bouts)   Current Activity Tolerance poor  (has been limited by dizziness w/ OT thus far)   Regular Exercise Yes  (was getting private pay home therapies per notes modA w/ STS and mod-maxA amb bouts of repetitious 10ft bouts; 2x/wk PT and OT, this has been going for ~3 wks prior to hospitalization;  reports ineffective therapy at [facility])   Equipment Currently Used at Home grab bar, toilet;grab bar, tub/shower;hospital bed;shower chair;walker, rolling;wheelchair, manual   Fall history within last six months yes   Number of times patient has fallen within last six months 1  (pt states \"I think so\"; per spouse in April tried to get up on her own when spouse not in the apt and fell b/w the chair and WC and was \"wedged in\"; per spouse has not attempted to get up on her own again since)   Activity/Exercise/Self-Care Comment staff assist w/ mobility; pt " "states gets help w/ \"everything\"; SPT w/  w/ use of walker; staff using EZ stand to transfer and  states that \"for the most part\" has been Ax1 w staff. Does state that sometimes it is 2 people assisting. Has been using EZ stand since May 2022 per  report. Has been in and out of ICU and nursing home and not at home over last couple of years reporting onset of this cycle in Feb 2021  (R foot drop and R UE weakness baseline, spouse feels this is getting worse)   General Information   Onset of Illness/Injury or Date of Surgery 10/06/23   Referring Physician Tommy Odom MD   Patient/Family Therapy Goals Statement (PT) per pt: get stronger; per  requests ROM initiated by PT;  states doubtful she will be able to amb but is hopeful of this   Pertinent History of Current Problem (include personal factors and/or comorbidities that impact the POC) Olga Bailey is a 78 year old female admitted on 10/6/2023. She has history of L frontoparietal glioblastoma s/p resection,chemotherapy and radiation with remission in 2021, recently found to have recurrence in September 2023, with baseline cognitive and functional impairment, h/o seizures, h/o DVT on xarelto, HTN, and depression who presented 10/6/23 with acute worsening of baseline aphasia as well as new facial twitching. Admitted to internal medicine for further work up and management.   Existing Precautions/Restrictions fall   Weight-Bearing Status - LUE full weight-bearing   Weight-Bearing Status - RUE full weight-bearing   Weight-Bearing Status - LLE full weight-bearing   Weight-Bearing Status - RLE full weight-bearing   General Observations Note minimal functional use of R UE and needs assist on this side w/ activities. Pt on RA and VSS throughout evaluation/treatment. Spouse present throughout and provides subjective information in conjunction w/ thorough chart review.   Cognition   Affect/Mental Status (Cognition) " anxious;confused  (nursing in room and asks if pt is anxious and pt confirms that she is)   Follows Commands (Cognition) follows one-step commands   Memory Deficit (Cognition)   (pt largely unable to provide any ind subjective information)   Cognitive Status Comments Pt appears more conversive compared to prior notes but remains confused and largely unable to provide subjective hx   Pain Assessment   Patient Currently in Pain No   Integumentary/Edema   Integumentary/Edema no deficits were identifed   Posture    Posture Forward head position;Protracted shoulders;Kyphosis   Range of Motion (ROM)   Range of Motion ROM deficits secondary to weakness   Strength (Manual Muscle Testing)   Strength (Manual Muscle Testing) Deficits observed during functional mobility   Strength Comments spouse feels R arm and R LE has diminished in functional mobility   Bed Mobility   Comment, (Bed Mobility) modA-maxAx2   Transfers   Comment, (Transfers) modAx2 progression to SBAx2 w/ use of SS   Gait/Stairs (Locomotion)   Comment, (Gait/Stairs) NT - unable to progress to gait, pt not functionally amb at baseline; has been amb in short bouts w/ mod-maxA of home therapists prior to admission   Balance   Balance Comments seated balance needs consistent VC to decrease R side lean, CGA-modA maintained; standing in SS again w/ R side lean and needs at least CGA to maintain upright balance safely   Sensory Examination   Sensory Perception   (does not verbalize changes, spouse states that foot drop is worse on R since admission; prevlon boots have been ordered per OT)   Clinical Impression   Criteria for Skilled Therapeutic Intervention Yes, treatment indicated   PT Diagnosis (PT) impaired functional mobility   Influenced by the following impairments decreased activity tolerance, dizziness, weakness   Functional limitations due to impairments impaired bed mob, transfers, amb   Clinical Presentation (PT Evaluation Complexity) evolving   Clinical  Presentation Rationale per chart review medically stable; pt is currently undergoing radiation treatments which may potentially evolve clinical presentation and affects discharge planning options as well   Clinical Decision Making (Complexity) moderate complexity   Planned Therapy Interventions (PT) balance training;bed mobility training;gait training;home exercise program;patient/family education;neuromuscular re-education;ROM (range of motion);strengthening;transfer training;wheelchair management/propulsion training   Risk & Benefits of therapy have been explained evaluation/treatment results reviewed;care plan/treatment goals reviewed;risks/benefits reviewed;current/potential barriers reviewed;participants voiced agreement with care plan;participants included;patient;spouse/significant other   Clinical Impression Comments Pt currently needing Ax2 for bed mobility and transfers using SS. Does seem to have shown some improved abilities and tolerance to therapy today; PT will continue to follow at higher frequency initially to assess for most appropriate discharge plan and w/ goal of progressing pt to Ax1 if goal is to safely DC to her halfway. However she does technically live alone in this [place] so strongly encouraging increased supervision and staff assist that would be best served by a LTC facility where she can receive continued therapies. Pt spouse adamantly desiring TCU, did have discussion about short term rehab goals of TCU and that pending rate of progression and amount of ability to participate in therapy she may not receive acceptance to a TCU facility in addition to her ongoing radiation treatments is large barrier to TCU acceptance.   PT Total Evaluation Time   PT Eval, Moderate Complexity Minutes (31843) 10   Physical Therapy Goals   PT Frequency Daily   PT Predicted Duration/Target Date for Goal Attainment 11/18/23   PT Goals Bed Mobility;Transfers;Wheelchair Mobility   PT: Bed Mobility Moderate  assist;Supine to/from sit;Rolling  (needs to be Ax1)   PT: Transfers Sit to/from stand;Moderate assist;Bed to/from chair  (Ax1 w/ EZ stand/lift)   PT: Wheelchair Mobility 50 feet;Caregiver SBA;manual wheelchair   Interventions   Interventions Quick Adds Therapeutic Activity   Therapeutic Activity   Therapeutic Activities: dynamic activities to improve functional performance Minutes (17380) 38   Symptoms Noted During/After Treatment Dizziness;Fatigue   Treatment Detail/Skilled Intervention Pt supine in bed resting on PT arrival, agreeable to participate w/ PT despite anxiety about mobility. Had extensive conversation and therapeutic listening provided to pt and spouse about spouse's concern regarding DC. We discussed current ability to participate had been limited d/t dizziness and rate of progression w/ mobility may not be appropriate for TCU on top of ongoing radiation treatments making for a difficult overall acceptance. We discussed therapy goal right now of assessing current mobility level and we can formalize recs based off session. They agree to this. Room prepped for mobility. Pt requires modAx2 for sup>logrolling, modAx2 to sit upright EOB. Pt has notable R weight shift and needs VC and manual assist for upright positioning, variable b/w CGA-modA for sitting balance, progresses w/ time and UE support. Yvette steady positioned and pt needs hand over hand guidance to place UEs on cross bar. Pt needs modAx2 for initial STS, maintains standing for ~1 minute to fatigue, VC for upright posture but able to follow cues. Sits on paddles. Repeated this 1x w/ modAx2 needed again and stands for ~30 sec. 2 additional STS from paddles completed w/ SBAx2 and stands for ~10 sec each time, on final stand paddles removed and pt needs minAx2 to sit controlled EOB. Pt requires maxAx2 for sit>sup and dependent boost up in bed. Pt left supine in bed w/ needs met.   PT Discharge Planning   PT Plan progress ind w/ bed mob, STS reps  at SS, sit upright in chair   PT Discharge Recommendation (DC Rec) Long term care facility;Transitional Care Facility;home with home care physical therapy;home with assist   PT Rationale for DC Rec Pt currently needing Ax2 for bed mobility and transfers using SS. Does seem to have shown some improved abilities and tolerance to therapy today; PT will continue to follow at higher frequency initially to assess for most appropriate discharge plan and w/ goal of progressing pt to Ax1 if goal is to safely DC to her [place]. However she does technically live alone in this [place] so strongly encouraging increased supervision and staff assist that would be best served by a LTC facility where she can receive continued therapies.Pt spouse adamantly desiring TCU, did have discussion about short term rehab goals of TCU and that pending rate of progression and amount of ability to participate in therapy she may not receive acceptance to a TCU facility in addition to her ongoing radiation treatments is large barrier to TCU acceptance.   PT Brief overview of current status Ax2 for bed mob, at this time logan steady only to be used w/ PT, nursing to use OH lift   Total Session Time   Timed Code Treatment Minutes 38   Total Session Time (sum of timed and untimed services) 48

## 2023-10-15 ENCOUNTER — APPOINTMENT (OUTPATIENT)
Dept: PHYSICAL THERAPY | Facility: CLINIC | Age: 78
DRG: 054 | End: 2023-10-15
Attending: STUDENT IN AN ORGANIZED HEALTH CARE EDUCATION/TRAINING PROGRAM
Payer: MEDICARE

## 2023-10-15 LAB
ANION GAP SERPL CALCULATED.3IONS-SCNC: 12 MMOL/L (ref 7–15)
BUN SERPL-MCNC: 17.8 MG/DL (ref 8–23)
CALCIUM SERPL-MCNC: 8.9 MG/DL (ref 8.8–10.2)
CHLORIDE SERPL-SCNC: 104 MMOL/L (ref 98–107)
CREAT SERPL-MCNC: 0.78 MG/DL (ref 0.51–0.95)
DEPRECATED HCO3 PLAS-SCNC: 21 MMOL/L (ref 22–29)
EGFRCR SERPLBLD CKD-EPI 2021: 77 ML/MIN/1.73M2
ERYTHROCYTE [DISTWIDTH] IN BLOOD BY AUTOMATED COUNT: 13.2 % (ref 10–15)
GLUCOSE SERPL-MCNC: 115 MG/DL (ref 70–99)
HCT VFR BLD AUTO: 29.4 % (ref 35–47)
HGB BLD-MCNC: 9.5 G/DL (ref 11.7–15.7)
MAGNESIUM SERPL-MCNC: 1.8 MG/DL (ref 1.7–2.3)
MCH RBC QN AUTO: 30 PG (ref 26.5–33)
MCHC RBC AUTO-ENTMCNC: 32.3 G/DL (ref 31.5–36.5)
MCV RBC AUTO: 93 FL (ref 78–100)
PHOSPHATE SERPL-MCNC: 3.5 MG/DL (ref 2.5–4.5)
PLATELET # BLD AUTO: 194 10E3/UL (ref 150–450)
POTASSIUM SERPL-SCNC: 4.2 MMOL/L (ref 3.4–5.3)
RBC # BLD AUTO: 3.17 10E6/UL (ref 3.8–5.2)
SODIUM SERPL-SCNC: 137 MMOL/L (ref 135–145)
WBC # BLD AUTO: 9.2 10E3/UL (ref 4–11)

## 2023-10-15 PROCEDURE — 84100 ASSAY OF PHOSPHORUS: CPT | Performed by: STUDENT IN AN ORGANIZED HEALTH CARE EDUCATION/TRAINING PROGRAM

## 2023-10-15 PROCEDURE — 36415 COLL VENOUS BLD VENIPUNCTURE: CPT | Performed by: INTERNAL MEDICINE

## 2023-10-15 PROCEDURE — 120N000002 HC R&B MED SURG/OB UMMC

## 2023-10-15 PROCEDURE — 83735 ASSAY OF MAGNESIUM: CPT | Performed by: STUDENT IN AN ORGANIZED HEALTH CARE EDUCATION/TRAINING PROGRAM

## 2023-10-15 PROCEDURE — 250N000013 HC RX MED GY IP 250 OP 250 PS 637: Performed by: STUDENT IN AN ORGANIZED HEALTH CARE EDUCATION/TRAINING PROGRAM

## 2023-10-15 PROCEDURE — C9254 INJECTION, LACOSAMIDE: HCPCS | Performed by: STUDENT IN AN ORGANIZED HEALTH CARE EDUCATION/TRAINING PROGRAM

## 2023-10-15 PROCEDURE — 94640 AIRWAY INHALATION TREATMENT: CPT | Mod: 76

## 2023-10-15 PROCEDURE — 97110 THERAPEUTIC EXERCISES: CPT | Mod: GP

## 2023-10-15 PROCEDURE — 85027 COMPLETE CBC AUTOMATED: CPT | Performed by: INTERNAL MEDICINE

## 2023-10-15 PROCEDURE — 999N000157 HC STATISTIC RCP TIME EA 10 MIN

## 2023-10-15 PROCEDURE — 258N000003 HC RX IP 258 OP 636: Performed by: STUDENT IN AN ORGANIZED HEALTH CARE EDUCATION/TRAINING PROGRAM

## 2023-10-15 PROCEDURE — 80048 BASIC METABOLIC PNL TOTAL CA: CPT | Performed by: INTERNAL MEDICINE

## 2023-10-15 PROCEDURE — 97530 THERAPEUTIC ACTIVITIES: CPT | Mod: GP

## 2023-10-15 PROCEDURE — 250N000009 HC RX 250: Performed by: PEDIATRICS

## 2023-10-15 PROCEDURE — 250N000011 HC RX IP 250 OP 636: Performed by: STUDENT IN AN ORGANIZED HEALTH CARE EDUCATION/TRAINING PROGRAM

## 2023-10-15 RX ADMIN — LACOSAMIDE 100 MG: 10 INJECTION INTRAVENOUS at 20:45

## 2023-10-15 RX ADMIN — AMLODIPINE BESYLATE 5 MG: 5 TABLET ORAL at 11:11

## 2023-10-15 RX ADMIN — LEVETIRACETAM 1250 MG: 100 SOLUTION ORAL at 20:03

## 2023-10-15 RX ADMIN — SULFAMETHOXAZOLE AND TRIMETHOPRIM 1 TABLET: 800; 160 TABLET ORAL at 11:11

## 2023-10-15 RX ADMIN — DEXAMETHASONE SODIUM PHOSPHATE 4 MG: 10 INJECTION, SOLUTION INTRAMUSCULAR; INTRAVENOUS at 20:03

## 2023-10-15 RX ADMIN — LACOSAMIDE 100 MG: 10 INJECTION INTRAVENOUS at 11:06

## 2023-10-15 RX ADMIN — IPRATROPIUM BROMIDE AND ALBUTEROL SULFATE 3 ML: 2.5; .5 SOLUTION RESPIRATORY (INHALATION) at 08:18

## 2023-10-15 RX ADMIN — IPRATROPIUM BROMIDE AND ALBUTEROL SULFATE 3 ML: 2.5; .5 SOLUTION RESPIRATORY (INHALATION) at 20:41

## 2023-10-15 RX ADMIN — FLUOXETINE HYDROCHLORIDE 40 MG: 20 SOLUTION ORAL at 11:16

## 2023-10-15 RX ADMIN — Medication 40 MG: at 11:16

## 2023-10-15 RX ADMIN — SODIUM CHLORIDE, POTASSIUM CHLORIDE, SODIUM LACTATE AND CALCIUM CHLORIDE: 600; 310; 30; 20 INJECTION, SOLUTION INTRAVENOUS at 17:41

## 2023-10-15 RX ADMIN — DEXAMETHASONE SODIUM PHOSPHATE 4 MG: 10 INJECTION, SOLUTION INTRAMUSCULAR; INTRAVENOUS at 11:11

## 2023-10-15 RX ADMIN — LEVETIRACETAM 1250 MG: 100 SOLUTION ORAL at 11:16

## 2023-10-15 RX ADMIN — BUSPIRONE HYDROCHLORIDE 30 MG: 30 TABLET ORAL at 11:12

## 2023-10-15 RX ADMIN — Medication 40 MG: at 17:43

## 2023-10-15 RX ADMIN — SENNOSIDES AND DOCUSATE SODIUM 1 TABLET: 50; 8.6 TABLET ORAL at 11:11

## 2023-10-15 RX ADMIN — BUSPIRONE HYDROCHLORIDE 30 MG: 30 TABLET ORAL at 20:02

## 2023-10-15 RX ADMIN — SENNOSIDES AND DOCUSATE SODIUM 1 TABLET: 50; 8.6 TABLET ORAL at 20:02

## 2023-10-15 RX ADMIN — IPRATROPIUM BROMIDE AND ALBUTEROL SULFATE 3 ML: 2.5; .5 SOLUTION RESPIRATORY (INHALATION) at 02:02

## 2023-10-15 RX ADMIN — RIVAROXABAN 20 MG: 10 TABLET, FILM COATED ORAL at 17:43

## 2023-10-15 RX ADMIN — IPRATROPIUM BROMIDE AND ALBUTEROL SULFATE 3 ML: 2.5; .5 SOLUTION RESPIRATORY (INHALATION) at 14:18

## 2023-10-15 RX ADMIN — THERA TABS 1 TABLET: TAB at 11:11

## 2023-10-15 ASSESSMENT — ACTIVITIES OF DAILY LIVING (ADL)
ADLS_ACUITY_SCORE: 57
ADLS_ACUITY_SCORE: 57
ADLS_ACUITY_SCORE: 56
ADLS_ACUITY_SCORE: 57
ADLS_ACUITY_SCORE: 57
ADLS_ACUITY_SCORE: 56
ADLS_ACUITY_SCORE: 57
ADLS_ACUITY_SCORE: 56

## 2023-10-15 NOTE — PLAN OF CARE
[id number]    Problem: Plan of Care - These are the overarching goals to be used throughout the patient stay.    Goal: Plan of Care Review  Description: The Plan of Care Review/Shift note should be completed every shift.  The Outcome Evaluation is a brief statement about your assessment that the patient is improving, declining, or no change.  This information will be displayed automatically on your shift note.  Outcome: Progressing  Flowsheets (Taken 10/14/2023 1954)  Plan of Care Reviewed With:   patient   spouse  Overall Patient Progress: no change  Goal: Absence of Hospital-Acquired Illness or Injury  Intervention: Identify and Manage Fall Risk  Recent Flowsheet Documentation  Taken 10/14/2023 1045 by Emily Kidd, RN  Safety Promotion/Fall Prevention:   assistive device/personal items within reach   room near nurse's station     Problem: Thought Process Alteration  Goal: Optimal Thought Clarity  Intervention: Minimize Safety Risk and Altered Thought  Recent Flowsheet Documentation  Taken 10/14/2023 1045 by Emily Kidd, RN  Enhanced Safety Measures: room near unit station   Goal Outcome Evaluation:      Plan of Care Reviewed With: patient, spouse    Overall Patient Progress: no changeOverall Patient Progress: no change

## 2023-10-15 NOTE — PROGRESS NOTES
Brief medicine note    No events overnight  Worked with PT, recommendation includes Long term care facility;Transitional Care Facility;home with home care physical therapy;home with assist   Continue to re-assess with each PT session  RT initiated  Appreciate oncology input  Dispo day to day pending placement

## 2023-10-15 NOTE — PLAN OF CARE
[id number]    Problem: Plan of Care - These are the overarching goals to be used throughout the patient stay.    Goal: Plan of Care Review  Description: The Plan of Care Review/Shift note should be completed every shift.  The Outcome Evaluation is a brief statement about your assessment that the patient is improving, declining, or no change.  This information will be displayed automatically on your shift note.  Outcome: Progressing  Flowsheets (Taken 10/15/2023 1832)  Plan of Care Reviewed With:   patient   spouse  Goal: Absence of Hospital-Acquired Illness or Injury  Intervention: Identify and Manage Fall Risk  Recent Flowsheet Documentation  Taken 10/15/2023 1100 by Emily Kidd, RN  Safety Promotion/Fall Prevention:   assistive device/personal items within reach   nonskid shoes/slippers when out of bed     Problem: Thought Process Alteration  Goal: Optimal Thought Clarity  Intervention: Minimize Safety Risk and Altered Thought  Recent Flowsheet Documentation  Taken 10/15/2023 1100 by Emily Kidd, RN  Enhanced Safety Measures: room near unit station   Goal Outcome Evaluation:      Plan of Care Reviewed With: patient, spouse    Overall Patient Progress: no changeOverall Patient Progress: no change

## 2023-10-15 NOTE — PLAN OF CARE
[id number]    Patient is A&O x3. Intermittent confusion noted. Soft touch button within reach. Able to make needs known. Needs assistance x1-2 with repositioning. Able to tolerate Yvette steady. Purewick in place. Wears brief. LBM: 10/13.    RA. Soft & Bite sized (Level 6), Thin Liquids (Level 0) diet. Meds crushed to apple sauce. PIV on L lower forearm infusing with LR at 75ml/hr. Denies pain, SOB, chest pain, n/v and n/t.     Seizure precaution maintained throughout the shift. Continue with POC.

## 2023-10-16 ENCOUNTER — APPOINTMENT (OUTPATIENT)
Dept: RADIATION ONCOLOGY | Facility: CLINIC | Age: 78
End: 2023-10-16
Attending: STUDENT IN AN ORGANIZED HEALTH CARE EDUCATION/TRAINING PROGRAM
Payer: MEDICARE

## 2023-10-16 ENCOUNTER — APPOINTMENT (OUTPATIENT)
Dept: PHYSICAL THERAPY | Facility: CLINIC | Age: 78
DRG: 054 | End: 2023-10-16
Attending: STUDENT IN AN ORGANIZED HEALTH CARE EDUCATION/TRAINING PROGRAM
Payer: MEDICARE

## 2023-10-16 LAB
ANION GAP SERPL CALCULATED.3IONS-SCNC: 10 MMOL/L (ref 7–15)
BUN SERPL-MCNC: 17.3 MG/DL (ref 8–23)
CALCIUM SERPL-MCNC: 9 MG/DL (ref 8.8–10.2)
CHLORIDE SERPL-SCNC: 101 MMOL/L (ref 98–107)
CREAT SERPL-MCNC: 0.78 MG/DL (ref 0.51–0.95)
DEPRECATED HCO3 PLAS-SCNC: 23 MMOL/L (ref 22–29)
EGFRCR SERPLBLD CKD-EPI 2021: 77 ML/MIN/1.73M2
ERYTHROCYTE [DISTWIDTH] IN BLOOD BY AUTOMATED COUNT: 13.3 % (ref 10–15)
GLUCOSE SERPL-MCNC: 112 MG/DL (ref 70–99)
HCT VFR BLD AUTO: 29.5 % (ref 35–47)
HGB BLD-MCNC: 9.3 G/DL (ref 11.7–15.7)
MAGNESIUM SERPL-MCNC: 2.4 MG/DL (ref 1.7–2.3)
MCH RBC QN AUTO: 29.3 PG (ref 26.5–33)
MCHC RBC AUTO-ENTMCNC: 31.5 G/DL (ref 31.5–36.5)
MCV RBC AUTO: 93 FL (ref 78–100)
PHOSPHATE SERPL-MCNC: 3.3 MG/DL (ref 2.5–4.5)
PLATELET # BLD AUTO: 214 10E3/UL (ref 150–450)
POTASSIUM SERPL-SCNC: 4 MMOL/L (ref 3.4–5.3)
RBC # BLD AUTO: 3.17 10E6/UL (ref 3.8–5.2)
SODIUM SERPL-SCNC: 134 MMOL/L (ref 135–145)
WBC # BLD AUTO: 9 10E3/UL (ref 4–11)

## 2023-10-16 PROCEDURE — 250N000011 HC RX IP 250 OP 636: Performed by: STUDENT IN AN ORGANIZED HEALTH CARE EDUCATION/TRAINING PROGRAM

## 2023-10-16 PROCEDURE — 250N000013 HC RX MED GY IP 250 OP 250 PS 637: Performed by: INTERNAL MEDICINE

## 2023-10-16 PROCEDURE — 83735 ASSAY OF MAGNESIUM: CPT | Performed by: STUDENT IN AN ORGANIZED HEALTH CARE EDUCATION/TRAINING PROGRAM

## 2023-10-16 PROCEDURE — 97530 THERAPEUTIC ACTIVITIES: CPT | Mod: GP

## 2023-10-16 PROCEDURE — 250N000009 HC RX 250: Performed by: PEDIATRICS

## 2023-10-16 PROCEDURE — 94640 AIRWAY INHALATION TREATMENT: CPT

## 2023-10-16 PROCEDURE — 85027 COMPLETE CBC AUTOMATED: CPT | Performed by: INTERNAL MEDICINE

## 2023-10-16 PROCEDURE — 94640 AIRWAY INHALATION TREATMENT: CPT | Mod: 76

## 2023-10-16 PROCEDURE — 250N000013 HC RX MED GY IP 250 OP 250 PS 637: Performed by: STUDENT IN AN ORGANIZED HEALTH CARE EDUCATION/TRAINING PROGRAM

## 2023-10-16 PROCEDURE — 36415 COLL VENOUS BLD VENIPUNCTURE: CPT | Performed by: INTERNAL MEDICINE

## 2023-10-16 PROCEDURE — 250N000011 HC RX IP 250 OP 636: Mod: JZ | Performed by: INTERNAL MEDICINE

## 2023-10-16 PROCEDURE — 120N000002 HC R&B MED SURG/OB UMMC

## 2023-10-16 PROCEDURE — 84100 ASSAY OF PHOSPHORUS: CPT | Performed by: STUDENT IN AN ORGANIZED HEALTH CARE EDUCATION/TRAINING PROGRAM

## 2023-10-16 PROCEDURE — 258N000003 HC RX IP 258 OP 636: Performed by: STUDENT IN AN ORGANIZED HEALTH CARE EDUCATION/TRAINING PROGRAM

## 2023-10-16 PROCEDURE — 80048 BASIC METABOLIC PNL TOTAL CA: CPT | Performed by: INTERNAL MEDICINE

## 2023-10-16 PROCEDURE — 77386 HC IMRT TREATMENT DELIVERY, COMPLEX: CPT | Performed by: STUDENT IN AN ORGANIZED HEALTH CARE EDUCATION/TRAINING PROGRAM

## 2023-10-16 PROCEDURE — C9254 INJECTION, LACOSAMIDE: HCPCS | Performed by: STUDENT IN AN ORGANIZED HEALTH CARE EDUCATION/TRAINING PROGRAM

## 2023-10-16 RX ORDER — DEXAMETHASONE SODIUM PHOSPHATE 10 MG/ML
2 INJECTION, SOLUTION INTRAMUSCULAR; INTRAVENOUS EVERY 12 HOURS
Status: DISCONTINUED | OUTPATIENT
Start: 2023-10-17 | End: 2023-10-19

## 2023-10-16 RX ORDER — LACOSAMIDE 50 MG/1
100 TABLET ORAL 2 TIMES DAILY
Status: DISCONTINUED | OUTPATIENT
Start: 2023-10-16 | End: 2023-11-28 | Stop reason: HOSPADM

## 2023-10-16 RX ORDER — DEXAMETHASONE SODIUM PHOSPHATE 10 MG/ML
4 INJECTION, SOLUTION INTRAMUSCULAR; INTRAVENOUS 2 TIMES DAILY
Status: COMPLETED | OUTPATIENT
Start: 2023-10-16 | End: 2023-10-16

## 2023-10-16 RX ADMIN — DEXAMETHASONE SODIUM PHOSPHATE 4 MG: 10 INJECTION, SOLUTION INTRAMUSCULAR; INTRAVENOUS at 19:40

## 2023-10-16 RX ADMIN — SULFAMETHOXAZOLE AND TRIMETHOPRIM 1 TABLET: 800; 160 TABLET ORAL at 08:21

## 2023-10-16 RX ADMIN — SENNOSIDES AND DOCUSATE SODIUM 1 TABLET: 50; 8.6 TABLET ORAL at 08:21

## 2023-10-16 RX ADMIN — Medication 40 MG: at 18:30

## 2023-10-16 RX ADMIN — BUSPIRONE HYDROCHLORIDE 30 MG: 30 TABLET ORAL at 19:40

## 2023-10-16 RX ADMIN — LACOSAMIDE 100 MG: 50 TABLET, FILM COATED ORAL at 19:40

## 2023-10-16 RX ADMIN — LACOSAMIDE 100 MG: 10 INJECTION INTRAVENOUS at 08:40

## 2023-10-16 RX ADMIN — BUSPIRONE HYDROCHLORIDE 30 MG: 30 TABLET ORAL at 08:39

## 2023-10-16 RX ADMIN — RIVAROXABAN 20 MG: 10 TABLET, FILM COATED ORAL at 18:30

## 2023-10-16 RX ADMIN — SENNOSIDES AND DOCUSATE SODIUM 1 TABLET: 50; 8.6 TABLET ORAL at 19:40

## 2023-10-16 RX ADMIN — IPRATROPIUM BROMIDE AND ALBUTEROL SULFATE 3 ML: 2.5; .5 SOLUTION RESPIRATORY (INHALATION) at 09:02

## 2023-10-16 RX ADMIN — IPRATROPIUM BROMIDE AND ALBUTEROL SULFATE 3 ML: 2.5; .5 SOLUTION RESPIRATORY (INHALATION) at 20:20

## 2023-10-16 RX ADMIN — Medication 40 MG: at 08:30

## 2023-10-16 RX ADMIN — AMLODIPINE BESYLATE 5 MG: 5 TABLET ORAL at 08:22

## 2023-10-16 RX ADMIN — LEVETIRACETAM 1250 MG: 100 SOLUTION ORAL at 08:22

## 2023-10-16 RX ADMIN — IPRATROPIUM BROMIDE AND ALBUTEROL SULFATE 3 ML: 2.5; .5 SOLUTION RESPIRATORY (INHALATION) at 01:41

## 2023-10-16 RX ADMIN — SODIUM CHLORIDE, POTASSIUM CHLORIDE, SODIUM LACTATE AND CALCIUM CHLORIDE: 600; 310; 30; 20 INJECTION, SOLUTION INTRAVENOUS at 08:23

## 2023-10-16 RX ADMIN — THERA TABS 1 TABLET: TAB at 08:21

## 2023-10-16 RX ADMIN — LEVETIRACETAM 1250 MG: 100 SOLUTION ORAL at 19:42

## 2023-10-16 RX ADMIN — FLUOXETINE HYDROCHLORIDE 40 MG: 20 SOLUTION ORAL at 08:21

## 2023-10-16 RX ADMIN — DEXAMETHASONE SODIUM PHOSPHATE 4 MG: 10 INJECTION, SOLUTION INTRAMUSCULAR; INTRAVENOUS at 08:21

## 2023-10-16 ASSESSMENT — ACTIVITIES OF DAILY LIVING (ADL)
ADLS_ACUITY_SCORE: 56

## 2023-10-16 NOTE — PLAN OF CARE
[id number]    Patient is A&O x4. Soft touch button within reach. Able to make needs known effectively. Needs assistance x1-2 with repositioning. Able to transfer with Yvette ayala. Purewick in place. Wears brief. LBM: 10/15.    RA. Soft & Bite sized (Level 6), Thin Liquids (Level 0) diet. Meds crushed to apple sauce. PIV on L lower forearm infusing with LR at 75ml/hr. Denies pain, SOB, chest pain, n/v and n/t. Noted scattered bruises on both upper extremities.     Seizure precaution maintained throughout the shift. Continue with POC.    Appointment for Radiation Oncology tomorrow, 10/16/2023 at [facility] in [place]. Transportation set up at [phone].

## 2023-10-16 NOTE — PROGRESS NOTES
Care Management Follow Up    Length of Stay (days): 9    Expected Discharge Date: 10/09/2023     Concerns to be Addressed: discharge planning     Patient plan of care discussed at interdisciplinary rounds: Yes    Anticipated Discharge Disposition:  LTC vs TCU vs back to [facility] with home care     Anticipated Discharge Services:    Anticipated Discharge DME:      Patient/family educated on Medicare website which has current facility and service quality ratings:  yes  Education Provided on the Discharge Plan:  yes  Patient/Family in Agreement with the Plan:      Referrals Placed by CM/SW:    Private pay costs discussed: Not applicable    Additional Information:    Per patient care rounds Olga is doing better and is participating in therapy.  Rec's are TCU, LTC or back to [facility] with home care/assist.  She started 10 days of radiation therapy last Thursday 10/12.  Last day to be 10/25 (not done on weekends).    Met with Olga and her  in her room.  Discussed all therapy recommendations.  Spouse is sure that TCU is the best option for her.  Plan is to send rec's for TCU's since [facility] TCU declined.  More than likely she will need to wait until the end of radiation therapy due to most TCU's not patient's with radiation therapy.    Call to Anastasiya Mello.

## 2023-10-16 NOTE — PROGRESS NOTES
Medicine dispo note    Unable to receive IV bevalizumab on [facility]  I discussed with unit charge nurse and oncology fellow, including whether pt can be sent to the infusion center or similar on the [facility] and then return to her [facility] bed, which I am informed cannot be done.     I called the transfer center at [phone] and requested a transfer from [facility] to [facility] med/surg bed, to remain medicine primary, with oncology consult and PT consult.     I requested a med/surg bed at the next available, however unfortunately it can take several days for an [facility] bed to become available.     Will benefit from IV bevalizumab infusion on [facility], and is already receiving RT on the [facility]. Will need repeat PT eval on arrival. Current recs are prior SNF vs TCU. See progress notes for further details.

## 2023-10-16 NOTE — PROGRESS NOTES
[facility]    Medicine Progress Note - Hospitalist Service, GOLD TEAM 21    Date of Admission:  10/6/2023    Assessment & Plan   Olga Bailey is a 78 year old female admitted on 10/6/2023. She has history of L frontoparietal glioblastoma s/p resection,chemotherapy and radiation with remission in 2021, recently found to have recurrence in September 2023, with baseline cognitive and functional impairment, h/o seizures, h/o DVT on xarelto, HTN, and depression who presented 10/6/23 with acute worsening of baseline aphasia as well as new facial twitching. Admitted to internal medicine for further work up and management.     Updates  -remains significantly below her baseline PTA, when she was walking 40 feet with assistance.   -started RT here for glioblastoma on 10/12.   -steroids as per oncology, may also dose bevacizumab while here. Reducing from dexamethasone 4mg iv bid to 2mg iv bid starting tomorrow.   -coughing improving with nebs  -cont PT and re-assess as able. Dispo planning to TCU vs SNF.     Partial Seizure: previously controlled  Worsened chronic aphasia   Difficulty Swallowing  Presented with acute (< 1 day) worsening of aphasia and new facial twitching with abnormal mouth movements concerning for partial seizure.  She underwent stroke eval in the ED which was reassuring against acute CVA,   Given recent recurrence of glioblastoma, there was concern for spread or advance of disease, though both CT and MRI were reassuring to stability of current malignancy from last imaging (9/2023).  No metabolic derangements to explain acute change, and norm WBC with no focal findings was reassuring against acute infection.  Neurology consulted and felt movements were consistent with partial seizure, lacosamide was added, though subsequent EEG showed some degree of persistent seizure activity, and further brief clinical seizures were observed.  Neurologist discussed  risk/benefit of a possible third antiepileptic medication (which could carry a risk of heavier sedation), the family elected to remain on the two current medications for quality of life reasons.  - Neurology consulted, appreciate thoughtful input  - Continue Keppra 1250 mg BID   - Lacosamide was started with 200mg IV loading dose (complete) then 100mg IV BID   - transitioned to PO on 10/16.    - Could increase to 150mg BID if increased seizure burden but stable on current dosing   - May reconsider third agent should seizure burden change drastically (ie Grand Mal, etc)   - Ativan PRN for any seizure activity > 2 minutes or increased clusters of seizure like activity   - Seizure precautions   - Neuro checks     H/o L frontoparietal glioblastoma s/p resection, chemotherapy and radiation (completed May 2021), with recurrence of malignancy 2023   Cognitive and functional impairment due to glioblastoma and chemoradiation   Seen by oncology on this visit, repeat imaging stable from September. Started on trial of steroid, eval symptom improvement. Patient has developed worsening difficulty with swallowing, now eating more.   - Oncology consulted, appreciate input  - Dexamethasone 4mg iv BID  (10/9 - 16)   - dexamethasone 2mg iv BID (10/17 - )   - Onc following, dose reduced starting tomorrow 10/17, further dosing as per oncoogy   - Onc considering bevacizumab.    - TMP-SMX daily for PCP PPX (pt had hx of PJP)   - Rad/Onc consulted, RT started on 10/12 for 10 fractions.   - PT/OT consulted, appreciate input.      Likely aspiration pneumonitis  Coughing with medications, significant water suctioned.  Felt a little short of breath afterwords, which she felt improved with a duoneb.  On exam, she does have scattered bilateral wheezes present.   - DuoNeb QID PRN   - SLP consult completed, appreciate input. Soft diet level 6 with thin liquids.    Depression   - Continue PTA Buspirone and Fluoxetine     H/o DVT   - Continue PTA  Xarelto     HTN   - Continue PTA Amlodipine     Hemorrhoids   -topical preparation H  -monitor           Diet: Soft & Bite Sized Diet (level 6) Thin Liquids (level 0)    DVT Prophylaxis: DOAC  Martino Catheter: Not present  Lines: None     Cardiac Monitoring: None  Code Status: Full Code      Clinically Significant Risk Factors                  # Hypertension: Noted on problem list                   Disposition Plan             Tommy Odom MD  Hospitalist Service, GOLD TEAM 21  [facility]  Securely message with [employer] (more info)  Text page via [employer] Paging/Directory   See signed in provider for up to date coverage information  ______________________________________________________________________    Interval History   No events  Working with PT    Physical Exam   Vital Signs: Temp: 99  F (37.2  C) Temp src: Oral BP: 137/68 Pulse: 81   Resp: 17 SpO2: 96 % O2 Device: None (Room air)    Weight: 0 lbs 0 oz    General Appearance: Alert and oriented in no acute distress  Respiratory: clear to auscultation bilaterally   Cardiovascular: regular   GI: soft nontender   Skin: warm   Other: Moving four extremities spontaneously. Unable to ambulate.      Medical Decision Making       30 MINUTES SPENT BY ME on the date of service doing chart review, history, exam, documentation & further activities per the note.      Data

## 2023-10-16 NOTE — PLAN OF CARE
Patient is alert and ox4, VSS, on RA,    No OOB this shift, logan steady to transfer.    Denied pain, SOB, chest pain.     Incont of B/B, purewick in place. LBM 10/15.     Level 6 bite/thin liquid diet, but the  said the patient had choking episodes in the past days and insisted in thickened liquid, so the level 1 thicker was used for the medication administration. Pt takes crushed pills with apple sauce without issues.     BLE edema. Elevated on the pillows.   Ax2 to turn/reposition Q 2 hrs.     Baseline of hands tremor.     Cont of POC.                        Otc Regimen: BPO wash 5% Detail Level: Zone

## 2023-10-17 ENCOUNTER — APPOINTMENT (OUTPATIENT)
Dept: SPEECH THERAPY | Facility: CLINIC | Age: 78
DRG: 054 | End: 2023-10-17
Attending: STUDENT IN AN ORGANIZED HEALTH CARE EDUCATION/TRAINING PROGRAM
Payer: MEDICARE

## 2023-10-17 ENCOUNTER — APPOINTMENT (OUTPATIENT)
Dept: PHYSICAL THERAPY | Facility: CLINIC | Age: 78
DRG: 054 | End: 2023-10-17
Attending: STUDENT IN AN ORGANIZED HEALTH CARE EDUCATION/TRAINING PROGRAM
Payer: MEDICARE

## 2023-10-17 ENCOUNTER — APPOINTMENT (OUTPATIENT)
Dept: RADIATION ONCOLOGY | Facility: CLINIC | Age: 78
End: 2023-10-17
Attending: STUDENT IN AN ORGANIZED HEALTH CARE EDUCATION/TRAINING PROGRAM
Payer: MEDICARE

## 2023-10-17 LAB
MAGNESIUM SERPL-MCNC: 1.9 MG/DL (ref 1.7–2.3)
PHOSPHATE SERPL-MCNC: 4.1 MG/DL (ref 2.5–4.5)

## 2023-10-17 PROCEDURE — 250N000013 HC RX MED GY IP 250 OP 250 PS 637: Performed by: STUDENT IN AN ORGANIZED HEALTH CARE EDUCATION/TRAINING PROGRAM

## 2023-10-17 PROCEDURE — 250N000009 HC RX 250: Performed by: PEDIATRICS

## 2023-10-17 PROCEDURE — 36415 COLL VENOUS BLD VENIPUNCTURE: CPT | Performed by: STUDENT IN AN ORGANIZED HEALTH CARE EDUCATION/TRAINING PROGRAM

## 2023-10-17 PROCEDURE — 250N000013 HC RX MED GY IP 250 OP 250 PS 637: Performed by: INTERNAL MEDICINE

## 2023-10-17 PROCEDURE — 99232 SBSQ HOSP IP/OBS MODERATE 35: CPT | Performed by: STUDENT IN AN ORGANIZED HEALTH CARE EDUCATION/TRAINING PROGRAM

## 2023-10-17 PROCEDURE — 97530 THERAPEUTIC ACTIVITIES: CPT | Mod: GP

## 2023-10-17 PROCEDURE — 206N000001 HC R&B BMT UMMC

## 2023-10-17 PROCEDURE — 94640 AIRWAY INHALATION TREATMENT: CPT | Mod: 76

## 2023-10-17 PROCEDURE — 83735 ASSAY OF MAGNESIUM: CPT | Performed by: STUDENT IN AN ORGANIZED HEALTH CARE EDUCATION/TRAINING PROGRAM

## 2023-10-17 PROCEDURE — 84100 ASSAY OF PHOSPHORUS: CPT | Performed by: STUDENT IN AN ORGANIZED HEALTH CARE EDUCATION/TRAINING PROGRAM

## 2023-10-17 PROCEDURE — 77386 HC IMRT TREATMENT DELIVERY, COMPLEX: CPT | Performed by: STUDENT IN AN ORGANIZED HEALTH CARE EDUCATION/TRAINING PROGRAM

## 2023-10-17 PROCEDURE — 92526 ORAL FUNCTION THERAPY: CPT | Mod: GN

## 2023-10-17 PROCEDURE — 94640 AIRWAY INHALATION TREATMENT: CPT

## 2023-10-17 PROCEDURE — 250N000011 HC RX IP 250 OP 636: Mod: JZ | Performed by: INTERNAL MEDICINE

## 2023-10-17 PROCEDURE — 258N000003 HC RX IP 258 OP 636: Performed by: STUDENT IN AN ORGANIZED HEALTH CARE EDUCATION/TRAINING PROGRAM

## 2023-10-17 RX ADMIN — AMLODIPINE BESYLATE 5 MG: 5 TABLET ORAL at 08:08

## 2023-10-17 RX ADMIN — IPRATROPIUM BROMIDE AND ALBUTEROL SULFATE 3 ML: 2.5; .5 SOLUTION RESPIRATORY (INHALATION) at 14:59

## 2023-10-17 RX ADMIN — IPRATROPIUM BROMIDE AND ALBUTEROL SULFATE 3 ML: 2.5; .5 SOLUTION RESPIRATORY (INHALATION) at 19:46

## 2023-10-17 RX ADMIN — LACOSAMIDE 100 MG: 50 TABLET, FILM COATED ORAL at 20:12

## 2023-10-17 RX ADMIN — THERA TABS 1 TABLET: TAB at 08:08

## 2023-10-17 RX ADMIN — SENNOSIDES AND DOCUSATE SODIUM 1 TABLET: 50; 8.6 TABLET ORAL at 08:08

## 2023-10-17 RX ADMIN — SENNOSIDES AND DOCUSATE SODIUM 1 TABLET: 50; 8.6 TABLET ORAL at 20:11

## 2023-10-17 RX ADMIN — RIVAROXABAN 20 MG: 10 TABLET, FILM COATED ORAL at 17:20

## 2023-10-17 RX ADMIN — IPRATROPIUM BROMIDE AND ALBUTEROL SULFATE 3 ML: 2.5; .5 SOLUTION RESPIRATORY (INHALATION) at 01:29

## 2023-10-17 RX ADMIN — LEVETIRACETAM 1250 MG: 100 SOLUTION ORAL at 08:07

## 2023-10-17 RX ADMIN — SODIUM CHLORIDE, POTASSIUM CHLORIDE, SODIUM LACTATE AND CALCIUM CHLORIDE 75 ML/HR: 600; 310; 30; 20 INJECTION, SOLUTION INTRAVENOUS at 06:19

## 2023-10-17 RX ADMIN — BUSPIRONE HYDROCHLORIDE 30 MG: 30 TABLET ORAL at 08:07

## 2023-10-17 RX ADMIN — SULFAMETHOXAZOLE AND TRIMETHOPRIM 1 TABLET: 800; 160 TABLET ORAL at 08:08

## 2023-10-17 RX ADMIN — DEXAMETHASONE SODIUM PHOSPHATE 2 MG: 10 INJECTION, SOLUTION INTRAMUSCULAR; INTRAVENOUS at 20:11

## 2023-10-17 RX ADMIN — Medication 40 MG: at 17:25

## 2023-10-17 RX ADMIN — IPRATROPIUM BROMIDE AND ALBUTEROL SULFATE 3 ML: 2.5; .5 SOLUTION RESPIRATORY (INHALATION) at 08:44

## 2023-10-17 RX ADMIN — FLUOXETINE HYDROCHLORIDE 40 MG: 20 SOLUTION ORAL at 08:07

## 2023-10-17 RX ADMIN — SODIUM CHLORIDE, POTASSIUM CHLORIDE, SODIUM LACTATE AND CALCIUM CHLORIDE: 600; 310; 30; 20 INJECTION, SOLUTION INTRAVENOUS at 22:39

## 2023-10-17 RX ADMIN — DEXAMETHASONE SODIUM PHOSPHATE 2 MG: 10 INJECTION, SOLUTION INTRAMUSCULAR; INTRAVENOUS at 08:07

## 2023-10-17 RX ADMIN — LEVETIRACETAM 1250 MG: 100 SOLUTION ORAL at 20:21

## 2023-10-17 RX ADMIN — Medication 40 MG: at 08:07

## 2023-10-17 RX ADMIN — LACOSAMIDE 100 MG: 50 TABLET, FILM COATED ORAL at 08:08

## 2023-10-17 RX ADMIN — BUSPIRONE HYDROCHLORIDE 30 MG: 30 TABLET ORAL at 20:12

## 2023-10-17 ASSESSMENT — ACTIVITIES OF DAILY LIVING (ADL)
ADLS_ACUITY_SCORE: 56
ADLS_ACUITY_SCORE: 54
ADLS_ACUITY_SCORE: 56
ADLS_ACUITY_SCORE: 54
ADLS_ACUITY_SCORE: 56
ADLS_ACUITY_SCORE: 54

## 2023-10-17 NOTE — PROGRESS NOTES
[facility]    Medicine Progress Note - Hospitalist Service, GOLD TEAM 21    Date of Admission:  10/6/2023    Assessment & Plan   Olga Bailey is a 78 year old female admitted on 10/6/2023. She has history of L frontoparietal glioblastoma s/p resection,chemotherapy and radiation with remission in 2021, recently found to have recurrence in September 2023, with baseline cognitive and functional impairment, h/o seizures, h/o DVT on xarelto, HTN, and depression who presented 10/6/23 with acute worsening of baseline aphasia as well as new facial twitching. Admitted to internal medicine for further work up and management.     Updates  - remains significantly below her baseline function, PTA she was walking 40 feet with assistance   - started RT here for glioblastoma on 10/12  - steroids as per oncology, may also dose bevacizumab while here. Reducing from dexamethasone 4mg iv bid to 2mg iv bid starting 10/17   - coughing improving with nebs  - cont PT and re-assess as able. Dispo planning to TCU vs SNF.     Partial Seizure: previously controlled  Worsened chronic aphasia   Difficulty Swallowing  Presented with acute (< 1 day) worsening of aphasia and new facial twitching with abnormal mouth movements concerning for partial seizure.  She underwent stroke eval in the ED which was reassuring against acute CVA,   Given recent recurrence of glioblastoma, there was concern for spread or advance of disease, though both CT and MRI were reassuring to stability of current malignancy from last imaging (9/2023).  No metabolic derangements to explain acute change, and norm WBC with no focal findings was reassuring against acute infection.  Neurology consulted and felt movements were consistent with partial seizure, lacosamide was added, though subsequent EEG showed some degree of persistent seizure activity, and further brief clinical seizures were observed.  Neurologist discussed  risk/benefit of a possible third antiepileptic medication (which could carry a risk of heavier sedation), the family elected to remain on the two current medications for quality of life reasons.  - Neurology consulted, appreciate thoughtful input  - Continue levetiracetam 1250 mg BID   - Lacosamide was started with 200mg IV loading dose (complete) then 100mg IV BID   - transitioned to PO on 10/16   - Could increase to 150mg BID if increased seizure burden but stable on current dosing   - May reconsider third agent should seizure burden change drastically (ie Grand Mal, etc)   - Lorazepam PRN for any seizure activity > 2 minutes or increased clusters of seizure like activity   - Seizure precautions   - Neuro checks     H/o L frontoparietal glioblastoma s/p resection, chemotherapy and radiation (completed May 2021), with recurrence of malignancy 2023   Cognitive and functional impairment due to glioblastoma and chemoradiation   Seen by oncology on this visit, repeat imaging stable from September. Started on trial of steroid, eval symptom improvement. Patient has developed worsening difficulty with swallowing, now eating more.   - Oncology consulted, appreciate input  - Dexamethasone 4mg iv BID  (10/9 - 16)   - Dexamethasone 2mg iv BID (10/17 - )   - Onc following, dose reduced starting tomorrow 10/17, further dosing as per oncoogy   - Onc considering bevacizumab - would need to be given on [place] (cannot be done at [facility] generally, and cannot be given on [place])   - TMP-SMX daily for PCP PPX (pt had hx of PJP)   - Rad/Onc consulted, RT started on 10/12 for 10 fractions    - No transportation benefit if this were needed from TCU  - PT/OT consulted, appreciate input     Likely aspiration pneumonitis  Coughing with medications, significant water suctioned.  Felt a little short of breath afterwords, which she felt improved with a duoneb.  On exam, she does have scattered bilateral wheezes present.   - DuoNeb QID PRN   -  SLP consult completed, appreciate input. Soft diet level 6 with thin liquids.    Depression   - Continue PTA Buspirone and Fluoxetine     H/o DVT   - Continue PTA rivaroxaban     HTN   - Continue PTA Amlodipine     Hemorrhoids   -topical preparation H  -monitor           Diet: Soft & Bite Sized Diet (level 6) Thin Liquids (level 0)    DVT Prophylaxis: DOAC  Martino Catheter: Not present  Lines: None     Cardiac Monitoring: None  Code Status: Full Code      Clinically Significant Risk Factors                  # Hypertension: Noted on problem list                   Disposition Plan             Narciso Crocekr DO  Hospitalist Service, GOLD TEAM [address] [place] [address]  Securely message with [employer] (more info)  Text page via [facility] Paging/Directory   See signed in provider for up to date coverage information  ______________________________________________________________________    Interval History   No events. Denies new complaints. Feels she has regained some strength on the right.  updated at bedside.    Physical Exam   Vital Signs: Temp: 98.5  F (36.9  C) Temp src: Oral BP: (!) 140/74 Pulse: 75   Resp: 16 SpO2: 95 % O2 Device: None (Room air)    Weight: 0 lbs 0 oz - not recorded this date    General Appearance: Alert and oriented in no acute distress  Respiratory: clear to auscultation bilaterally   Cardiovascular: regular   GI: soft nontender   Skin: warm   Neuro: Coarse resting tremor BUE  Other: Moving four extremities spontaneously. Unable to ambulate.      Medical Decision Making       45 MINUTES SPENT BY ME on the date of service doing chart review, history, exam, documentation & further activities per the note.      Data

## 2023-10-17 NOTE — PROGRESS NOTES
"CLINICAL NUTRITION SERVICES - ASSESSMENT NOTE     Nutrition Prescription    RECOMMENDATIONS FOR MDs/PROVIDERS TO ORDER:  None at present.     Malnutrition Status:    Unable to determine due to no recent weights available.    Recommendations already ordered by Registered Dietitian (RD):  - Requested wt--spoke w/ bedside RN.  - Start Solomon Magic Cup with dinner and allow pt/RN to order additional prn.    Future/Additional Recommendations:  - Follow po intake, any further SLP evals.      Met with pt briefly while she had a neb treatment up in chair.  I apparently just missed her  who was currently driving a visitor home and was unavailable via phone.  Pt feels her appetite has been good lately and that she's eating well.  She's not sure if she is missing any meals due to appointments.  Kept my visit and questions brief so pt could get full benefit from neb treatment.     REASON FOR ASSESSMENT  Olga Bailey is a/an 78 year old female assessed by the dietitian for LOS    Chart reviewed.  Per chart: PMH of L frontoparietal glioblastoma s/p resection, chemotherapy and radiation with remission in 2021, recently found to have recurrence in September 2023, with baseline cognitive and functional impairment, h/o seizures, h/o DVT on xarelto, HTN, and depression who presented 10/6/23 with acute worsening of baseline aphasia as well as new facial twitching.  Noted that pt's radiation therapy will continue x 10 sessions and conclude on 10/25 with plan for discharge to TCU after that.    NUTRITION HISTORY  Pt lives with  in their [place].  Presume meals provided there as pt is currently too weak for meal prep.  Limited discussion as pt receiving nebulizer during visit.  Pt feels she's been eating well prior to admission.  Pt feels she is doing well with current diet texture and thin liquids.  She denies any coughing or \"gurgly\" vocal quality after drinking thin liquid. She has had Ensure in the past and disliked it.  " "She also had Magic cup and did like this--prefers berry (and orange but out of stock).     CURRENT NUTRITION ORDERS  Diet: 10/9-Level 6: Soft & Bite-Sized Dysphagia Diet, thin liquids \"Assist with tray set-up and feeding as needed. Encourage slow rate of intake and small bites/sips. Give pills mixed in applesauce of pudding. Crush as needed.\"   IVF: LR @ 75 ml/hr since 10/9    Intake/Tolerance:   Pt was on Pureed (level 4) with Extremely Thick (level 4 = pudding thick) liquids 10/7 to 10/8, then NPO until current diet ordered.     Per  meal orders, pt is usually receiving 3 meals/day but unclear if she is missing any of these for oncology appointments on [place].  7 day average for meals delivered was 1741 kcal and 79 g protein/day.  Documented intake has been % of 9 out of 13 meals or possibly snacks/beverages over past week.    Per 10/12 SLP charting: Recommend soft and bite sized diet (6) and thin liquids (0) with 1:1 supervision. Please ensure pt is fully upright for all PO, taking small sips/bites, slow pacing, and alternate between consistencies. Recommend PO meds in puree. Recommend XR video swallow study to further assess oropharyngeal swallow function given persisting aspiration events per  report (scheduled Monday 10/16 at 2PM).     LABS  Labs reviewed  Electrolytes wnl recently except for Na 134 10/16 and Mg 2.4 on 10/16.  Hgb 9.3 10/16--trending down from prior mid10's.    GI Last BM--10/17--large soft.    MEDICATIONS  Medications reviewed  Decadron IV q 12 hr 2 mg dose starting 10/17  Multivitamin (no minerals)   Protonix BID  Senna-docusate 2x/d  Bactrim q day ( ppx)    ANTHROPOMETRICS  Height: 5'3\"  Most Recent Weight: Not weighed yet this admission Last wt: 66.2 kg 4/18/23   IBW: 52.3 kg (as of 4/18, pt was at 127% IBW)  BMI: n/a--no current wt  Weight History:   Wt Readings from Last 10 Encounters:   04/18/23 66.2 kg (146 lb)   01/17/23 64.6 kg (142 lb 6.7 oz) "   01/17/23 64.6 kg (142 lb 8 oz)   06/03/22 63 kg (139 lb)   04/18/22 65.6 kg (144 lb 11.2 oz)   02/24/22 67.1 kg (148 lb)   02/14/22 65.8 kg (145 lb)   12/21/21 65.8 kg (145 lb)   12/03/21 68.5 kg (151 lb)   11/16/21 68.4 kg (150 lb 12.8 oz)     No weights in last ~6 months to assess.     Dosing Weight: 56 kg (using IBW of 52.3 and latest wt available from 4/18/23--66.2 kg)    ASSESSED NUTRITION NEEDS  Estimated Energy Needs: 1400 - 1680 + kcals/day (25 - 30 kcals/kg)  Justification: Maintenance with potential for higher needs w/ Ca  Estimated Protein Needs: 67+ grams protein/day (1.2+)  Justification: Increased needs with recurrence of glioblastoma  Estimated Fluid Needs: 0019-0160 mL/day (25 - 30 mL/kg)   Justification: Maintenance    PHYSICAL FINDINGS  See malnutrition section below.  No abnormal nutrition-related physical findings observed.     MALNUTRITION  % Intake: No decreased intake noted  % Weight Loss: Unable to assess  Subcutaneous Fat Loss: none noted  Muscle Loss: None observed  Fluid Accumulation/Edema: None noted  Malnutrition Diagnosis: Unable to determine due to no recent weights available    NUTRITION DIAGNOSIS  Predicted inadequate nutrient intake of kcal/protein related to menu fatigue and/or missed meals with trips to [place] for Radiation as evidenced by 10 day LOS and no discharge expected for another week.       INTERVENTIONS  Implementation  Nutrition Education: Discussed potential for missed meals with trips to [place].  Discussed ONS to help meet needs. Pt agreed to Magic cup with dinner and explained she can order more often as desired.    Medical food supplement therapy     Goals  Patient to consume % of nutritionally adequate meal trays TID, or the equivalent with supplements/snacks.     Monitoring/Evaluation  Progress toward goals will be monitored and evaluated per protocol.  Mayra Doran) Leighann RD, LD   6B and 8A Med/surg (M-F) Pager: [phone]  Weekend/holiday  pager: [phone]

## 2023-10-17 NOTE — PLAN OF CARE
VS:  36.8  17 76 133/75    O2: SpO2 > 97 and stable on RA. Diminished LS    Output: purewick   Last BM: 10/15/2023   Activity:  A X 2; logan steady   Skin: Unable to fully assessed   Pain: No other nonverbal cues of pain; 0/10   CMS: Aox4   R arm with weakness (history CVA)  With tremors   Dressing:  none   Diet:  Level 6 and thin liquids  CRUSHED PILLS   LDA: L PIV SL / infusing LR at 75 mL/hr   Equipment: IV pole, personal belongings, monitor   Plan: seizure precautions maintained / Continue with plan of care. Call light within reach, pt able to make needs known.    Additional Info:  Plan: for swallow study   Rad at [place] at 1200    0210 offered oral suctioning

## 2023-10-17 NOTE — PLAN OF CARE
Goal Outcome Evaluation:      Problem: Plan of Care - These are the overarching goals to be used throughout the patient stay.    Goal: Plan of Care Review  Description: The Plan of Care Review/Shift note should be completed every shift.  The Outcome Evaluation is a brief statement about your assessment that the patient is improving, declining, or no change.  This information will be displayed automatically on your shift note.  Outcome: Progressing     Problem: Seizure Disorder Comorbidity  Goal: Maintenance of Seizure Control  Outcome: Progressing  Intervention: Maintain Seizure-Symptom Control  Recent Flowsheet Documentation  Taken 10/16/2023 1700 by Alfonzo Blakely RN  Seizure Precautions:   emergency equipment at bedside   side rails padded     Problem: Hypertension Comorbidity  Goal: Blood Pressure in Desired Range  Outcome: Progressing  Intervention: Maintain Blood Pressure Management  Recent Flowsheet Documentation  Taken 10/16/2023 1700 by Alfonzo Blakely RN  Medication Review/Management:   pharmacy consulted   medications reviewed     Problem: Thought Process Alteration  Goal: Optimal Thought Clarity  Outcome: Progressing  Intervention: Minimize Safety Risk and Altered Thought  Recent Flowsheet Documentation  Taken 10/16/2023 1700 by Alfonzo Blakely RN  Enhanced Safety Measures: room near unit station     Problem: Swallowing Impairment  Goal: Optimal Eating/Swallowing without Aspiration  Outcome: Progressing  Intervention: Optimize Eating and Swallowing  Recent Flowsheet Documentation  Taken 10/16/2023 1700 by Alfonzo Blakely RN  Aspiration Precautions:   liquids thickened   respiratory status monitored   upright posture maintained     Problem: Communication Impairment  Goal: Effective Communication Skills  Outcome: Progressing

## 2023-10-17 NOTE — PROGRESS NOTES
Care Management Follow Up    Length of Stay (days): 10    Expected Discharge Date: 10/09/2023     Concerns to be Addressed: discharge planning     Patient plan of care discussed at interdisciplinary rounds: Yes    Anticipated Discharge Disposition: Long Term Care, Transitional Care, Home, Home Care     Anticipated Discharge Services:    Anticipated Discharge DME:      Patient/family educated on Medicare website which has current facility and service quality ratings:    Education Provided on the Discharge Plan:    Patient/Family in Agreement with the Plan:      Referrals Placed by CM/SW:    Private pay costs discussed: Not applicable    Additional Information:    Attempted to meet with patient and spouse in room to provide medicare.gov list of TCU's.  She was gone to radiation on the [place] and spouse is not here.    RNCC/SW to give list tomorrow.    Gabriela Ly RN, BA  Care Coordinator  6 Med Surg- 10 ICU beds 1037 & 1039  [phone], pager [phone]      For weekend social work needs, contact information below and available on [facility] [place]:      Weekend [place] Pager ED, 5 MS, 5 ortho : [phone]   Weekend 6MS, 8A, 10ICU- Pager: [phone]     For weekend RN care coordinator needs (home discharge with needs including home care, assisted living facility returns, durable medical equip, IV antibiotics) - page [phone]

## 2023-10-17 NOTE — PLAN OF CARE
Goal Outcome Evaluation:      Plan of Care Reviewed With: patient, spouse    Overall Patient Progress: no change    Outcome Evaluation: No acute events during shift. No changes. Patient to transfer to [place] this evening.

## 2023-10-18 ENCOUNTER — APPOINTMENT (OUTPATIENT)
Dept: SPEECH THERAPY | Facility: CLINIC | Age: 78
DRG: 054 | End: 2023-10-18
Attending: STUDENT IN AN ORGANIZED HEALTH CARE EDUCATION/TRAINING PROGRAM
Payer: MEDICARE

## 2023-10-18 ENCOUNTER — APPOINTMENT (OUTPATIENT)
Dept: PHYSICAL THERAPY | Facility: CLINIC | Age: 78
DRG: 054 | End: 2023-10-18
Attending: STUDENT IN AN ORGANIZED HEALTH CARE EDUCATION/TRAINING PROGRAM
Payer: MEDICARE

## 2023-10-18 ENCOUNTER — APPOINTMENT (OUTPATIENT)
Dept: RADIATION ONCOLOGY | Facility: CLINIC | Age: 78
End: 2023-10-18
Attending: STUDENT IN AN ORGANIZED HEALTH CARE EDUCATION/TRAINING PROGRAM
Payer: MEDICARE

## 2023-10-18 LAB
ALBUMIN SERPL BCG-MCNC: 3.5 G/DL (ref 3.5–5.2)
ALP SERPL-CCNC: 65 U/L (ref 35–104)
ALT SERPL W P-5'-P-CCNC: 19 U/L (ref 0–50)
ANION GAP SERPL CALCULATED.3IONS-SCNC: 11 MMOL/L (ref 7–15)
AST SERPL W P-5'-P-CCNC: 12 U/L (ref 0–45)
BILIRUB SERPL-MCNC: 0.2 MG/DL
BUN SERPL-MCNC: 23.1 MG/DL (ref 8–23)
CALCIUM SERPL-MCNC: 9.2 MG/DL (ref 8.8–10.2)
CHLORIDE SERPL-SCNC: 103 MMOL/L (ref 98–107)
CREAT SERPL-MCNC: 0.88 MG/DL (ref 0.51–0.95)
DEPRECATED HCO3 PLAS-SCNC: 25 MMOL/L (ref 22–29)
EGFRCR SERPLBLD CKD-EPI 2021: 67 ML/MIN/1.73M2
ERYTHROCYTE [DISTWIDTH] IN BLOOD BY AUTOMATED COUNT: 13.7 % (ref 10–15)
GLUCOSE SERPL-MCNC: 120 MG/DL (ref 70–99)
HCT VFR BLD AUTO: 28.9 % (ref 35–47)
HGB BLD-MCNC: 9.4 G/DL (ref 11.7–15.7)
MAGNESIUM SERPL-MCNC: 1.9 MG/DL (ref 1.7–2.3)
MCH RBC QN AUTO: 29.9 PG (ref 26.5–33)
MCHC RBC AUTO-ENTMCNC: 32.5 G/DL (ref 31.5–36.5)
MCV RBC AUTO: 92 FL (ref 78–100)
PHOSPHATE SERPL-MCNC: 3.9 MG/DL (ref 2.5–4.5)
PLATELET # BLD AUTO: 200 10E3/UL (ref 150–450)
POTASSIUM SERPL-SCNC: 4.2 MMOL/L (ref 3.4–5.3)
PROT SERPL-MCNC: 5.4 G/DL (ref 6.4–8.3)
RBC # BLD AUTO: 3.14 10E6/UL (ref 3.8–5.2)
SODIUM SERPL-SCNC: 139 MMOL/L (ref 135–145)
WBC # BLD AUTO: 9.4 10E3/UL (ref 4–11)

## 2023-10-18 PROCEDURE — 250N000013 HC RX MED GY IP 250 OP 250 PS 637: Performed by: STUDENT IN AN ORGANIZED HEALTH CARE EDUCATION/TRAINING PROGRAM

## 2023-10-18 PROCEDURE — 999N000248 HC STATISTIC IV INSERT WITH US BY RN

## 2023-10-18 PROCEDURE — 36415 COLL VENOUS BLD VENIPUNCTURE: CPT | Performed by: PHYSICIAN ASSISTANT

## 2023-10-18 PROCEDURE — 99233 SBSQ HOSP IP/OBS HIGH 50: CPT | Performed by: INTERNAL MEDICINE

## 2023-10-18 PROCEDURE — 120N000005 HC R&B MS OVERFLOW UMMC

## 2023-10-18 PROCEDURE — 258N000003 HC RX IP 258 OP 636: Performed by: PSYCHIATRY & NEUROLOGY

## 2023-10-18 PROCEDURE — 250N000013 HC RX MED GY IP 250 OP 250 PS 637: Performed by: INTERNAL MEDICINE

## 2023-10-18 PROCEDURE — 94640 AIRWAY INHALATION TREATMENT: CPT

## 2023-10-18 PROCEDURE — 999N000157 HC STATISTIC RCP TIME EA 10 MIN

## 2023-10-18 PROCEDURE — 94640 AIRWAY INHALATION TREATMENT: CPT | Mod: 76

## 2023-10-18 PROCEDURE — 85014 HEMATOCRIT: CPT | Performed by: PHYSICIAN ASSISTANT

## 2023-10-18 PROCEDURE — 250N000009 HC RX 250: Performed by: STUDENT IN AN ORGANIZED HEALTH CARE EDUCATION/TRAINING PROGRAM

## 2023-10-18 PROCEDURE — 77427 RADIATION TX MANAGEMENT X5: CPT | Performed by: STUDENT IN AN ORGANIZED HEALTH CARE EDUCATION/TRAINING PROGRAM

## 2023-10-18 PROCEDURE — 84100 ASSAY OF PHOSPHORUS: CPT | Performed by: STUDENT IN AN ORGANIZED HEALTH CARE EDUCATION/TRAINING PROGRAM

## 2023-10-18 PROCEDURE — 80053 COMPREHEN METABOLIC PANEL: CPT | Performed by: PHYSICIAN ASSISTANT

## 2023-10-18 PROCEDURE — 250N000009 HC RX 250: Performed by: PEDIATRICS

## 2023-10-18 PROCEDURE — 258N000003 HC RX IP 258 OP 636: Performed by: STUDENT IN AN ORGANIZED HEALTH CARE EDUCATION/TRAINING PROGRAM

## 2023-10-18 PROCEDURE — 250N000011 HC RX IP 250 OP 636: Mod: JZ | Performed by: PSYCHIATRY & NEUROLOGY

## 2023-10-18 PROCEDURE — 77386 HC IMRT TREATMENT DELIVERY, COMPLEX: CPT | Performed by: STUDENT IN AN ORGANIZED HEALTH CARE EDUCATION/TRAINING PROGRAM

## 2023-10-18 PROCEDURE — 97530 THERAPEUTIC ACTIVITIES: CPT | Mod: GP

## 2023-10-18 PROCEDURE — 250N000011 HC RX IP 250 OP 636: Mod: JZ | Performed by: INTERNAL MEDICINE

## 2023-10-18 PROCEDURE — 92526 ORAL FUNCTION THERAPY: CPT | Mod: GN

## 2023-10-18 PROCEDURE — 99233 SBSQ HOSP IP/OBS HIGH 50: CPT | Performed by: STUDENT IN AN ORGANIZED HEALTH CARE EDUCATION/TRAINING PROGRAM

## 2023-10-18 PROCEDURE — 77336 RADIATION PHYSICS CONSULT: CPT | Performed by: STUDENT IN AN ORGANIZED HEALTH CARE EDUCATION/TRAINING PROGRAM

## 2023-10-18 PROCEDURE — 83735 ASSAY OF MAGNESIUM: CPT | Performed by: STUDENT IN AN ORGANIZED HEALTH CARE EDUCATION/TRAINING PROGRAM

## 2023-10-18 RX ORDER — ALBUTEROL SULFATE 0.83 MG/ML
2.5 SOLUTION RESPIRATORY (INHALATION)
Status: ACTIVE | OUTPATIENT
Start: 2023-10-18 | End: 2023-10-19

## 2023-10-18 RX ORDER — ALBUTEROL SULFATE 90 UG/1
1-2 AEROSOL, METERED RESPIRATORY (INHALATION)
Status: ACTIVE | OUTPATIENT
Start: 2023-10-18 | End: 2023-10-19

## 2023-10-18 RX ORDER — ALBUTEROL SULFATE 0.83 MG/ML
2.5 SOLUTION RESPIRATORY (INHALATION)
Status: DISCONTINUED | OUTPATIENT
Start: 2023-10-18 | End: 2023-10-24

## 2023-10-18 RX ORDER — HEPARIN SODIUM,PORCINE 10 UNIT/ML
5-20 VIAL (ML) INTRAVENOUS DAILY PRN
Status: DISCONTINUED | OUTPATIENT
Start: 2023-10-18 | End: 2023-10-18

## 2023-10-18 RX ORDER — MEPERIDINE HYDROCHLORIDE 25 MG/ML
25 INJECTION INTRAMUSCULAR; INTRAVENOUS; SUBCUTANEOUS EVERY 30 MIN PRN
Status: ACTIVE | OUTPATIENT
Start: 2023-10-18 | End: 2023-10-19

## 2023-10-18 RX ORDER — METHYLPREDNISOLONE SODIUM SUCCINATE 125 MG/2ML
125 INJECTION, POWDER, LYOPHILIZED, FOR SOLUTION INTRAMUSCULAR; INTRAVENOUS
Status: ACTIVE | OUTPATIENT
Start: 2023-10-18 | End: 2023-10-19

## 2023-10-18 RX ORDER — DIPHENHYDRAMINE HYDROCHLORIDE 50 MG/ML
50 INJECTION INTRAMUSCULAR; INTRAVENOUS
Status: ACTIVE | OUTPATIENT
Start: 2023-10-18 | End: 2023-10-19

## 2023-10-18 RX ORDER — LORAZEPAM 2 MG/ML
0.5 INJECTION INTRAMUSCULAR EVERY 4 HOURS PRN
Status: ACTIVE | OUTPATIENT
Start: 2023-10-18 | End: 2023-10-19

## 2023-10-18 RX ORDER — EPINEPHRINE 1 MG/ML
0.3 INJECTION, SOLUTION, CONCENTRATE INTRAVENOUS EVERY 5 MIN PRN
Status: ACTIVE | OUTPATIENT
Start: 2023-10-18 | End: 2023-10-19

## 2023-10-18 RX ORDER — HEPARIN SODIUM (PORCINE) LOCK FLUSH IV SOLN 100 UNIT/ML 100 UNIT/ML
5 SOLUTION INTRAVENOUS
Status: DISCONTINUED | OUTPATIENT
Start: 2023-10-18 | End: 2023-10-18

## 2023-10-18 RX ADMIN — LEVETIRACETAM 1250 MG: 100 SOLUTION ORAL at 10:39

## 2023-10-18 RX ADMIN — AMLODIPINE BESYLATE 5 MG: 5 TABLET ORAL at 08:50

## 2023-10-18 RX ADMIN — DEXAMETHASONE SODIUM PHOSPHATE 2 MG: 10 INJECTION, SOLUTION INTRAMUSCULAR; INTRAVENOUS at 08:50

## 2023-10-18 RX ADMIN — IPRATROPIUM BROMIDE AND ALBUTEROL SULFATE 3 ML: 2.5; .5 SOLUTION RESPIRATORY (INHALATION) at 02:22

## 2023-10-18 RX ADMIN — ALBUTEROL SULFATE 2.5 MG: 2.5 SOLUTION RESPIRATORY (INHALATION) at 20:47

## 2023-10-18 RX ADMIN — BUSPIRONE HYDROCHLORIDE 30 MG: 30 TABLET ORAL at 10:39

## 2023-10-18 RX ADMIN — IPRATROPIUM BROMIDE AND ALBUTEROL SULFATE 3 ML: 2.5; .5 SOLUTION RESPIRATORY (INHALATION) at 07:56

## 2023-10-18 RX ADMIN — THERA TABS 1 TABLET: TAB at 08:50

## 2023-10-18 RX ADMIN — RIVAROXABAN 20 MG: 10 TABLET, FILM COATED ORAL at 17:50

## 2023-10-18 RX ADMIN — DEXAMETHASONE SODIUM PHOSPHATE 2 MG: 10 INJECTION, SOLUTION INTRAMUSCULAR; INTRAVENOUS at 20:04

## 2023-10-18 RX ADMIN — LEVETIRACETAM 1250 MG: 100 SOLUTION ORAL at 20:04

## 2023-10-18 RX ADMIN — Medication 40 MG: at 10:39

## 2023-10-18 RX ADMIN — SODIUM CHLORIDE, POTASSIUM CHLORIDE, SODIUM LACTATE AND CALCIUM CHLORIDE: 600; 310; 30; 20 INJECTION, SOLUTION INTRAVENOUS at 12:00

## 2023-10-18 RX ADMIN — BUSPIRONE HYDROCHLORIDE 30 MG: 30 TABLET ORAL at 20:05

## 2023-10-18 RX ADMIN — LACOSAMIDE 100 MG: 50 TABLET, FILM COATED ORAL at 20:05

## 2023-10-18 RX ADMIN — IPRATROPIUM BROMIDE AND ALBUTEROL SULFATE 3 ML: 2.5; .5 SOLUTION RESPIRATORY (INHALATION) at 13:43

## 2023-10-18 RX ADMIN — SODIUM CHLORIDE 700 MG: 9 INJECTION, SOLUTION INTRAVENOUS at 17:39

## 2023-10-18 RX ADMIN — FLUOXETINE HYDROCHLORIDE 40 MG: 20 SOLUTION ORAL at 10:39

## 2023-10-18 RX ADMIN — Medication 40 MG: at 17:39

## 2023-10-18 RX ADMIN — SULFAMETHOXAZOLE AND TRIMETHOPRIM 1 TABLET: 800; 160 TABLET ORAL at 08:50

## 2023-10-18 RX ADMIN — LACOSAMIDE 100 MG: 50 TABLET, FILM COATED ORAL at 08:50

## 2023-10-18 RX ADMIN — SENNOSIDES AND DOCUSATE SODIUM 1 TABLET: 50; 8.6 TABLET ORAL at 20:04

## 2023-10-18 RX ADMIN — SODIUM CHLORIDE, POTASSIUM CHLORIDE, SODIUM LACTATE AND CALCIUM CHLORIDE: 600; 310; 30; 20 INJECTION, SOLUTION INTRAVENOUS at 18:52

## 2023-10-18 ASSESSMENT — ACTIVITIES OF DAILY LIVING (ADL)
ADLS_ACUITY_SCORE: 55
ADLS_ACUITY_SCORE: 59
ADLS_ACUITY_SCORE: 54
ADLS_ACUITY_SCORE: 59
ADLS_ACUITY_SCORE: 54
ADLS_ACUITY_SCORE: 54
ADLS_ACUITY_SCORE: 59
ADLS_ACUITY_SCORE: 59
ADLS_ACUITY_SCORE: 54
ADLS_ACUITY_SCORE: 54

## 2023-10-18 NOTE — PROGRESS NOTES
[facility]    Medicine Progress Note - Hospitalist Service, GOLD TEAM 10    Date of Admission:  10/6/2023    Assessment & Plan   Olga Bailey is a 78 year old female admitted on 10/6/2023. She has history of L frontoparietal glioblastoma s/p resection,chemotherapy and radiation with remission in 2021, recently found to have recurrence in September 2023, with baseline cognitive and functional impairment, h/o seizures, h/o DVT on xarelto, HTN, and depression who presented 10/6/23 with acute worsening of baseline aphasia as well as new facial twitching. Admitted to internal medicine for further work up and management. Neurology consulted and patient found to be having partial seizures for which lacosamide was added to keppra with improvement in seizure activity. Continued radiotherapy while on [facility] but has now transferred to [place] for concomitant bevacizumab.    Partial Seizure: previously controlled  Worsened chronic aphasia   Difficulty Swallowing  Presented with acute (< 1 day) worsening of aphasia and new facial twitching with abnormal mouth movements concerning for partial seizure.  She underwent stroke eval in the ED which was reassuring against acute CVA,   Given recent recurrence of glioblastoma, there was concern for spread or advance of disease, though both CT and MRI were reassuring to stability of current malignancy from last imaging (9/2023).  No metabolic derangements to explain acute change, and norm WBC with no focal findings was reassuring against acute infection.  Neurology consulted and felt movements were consistent with partial seizure, lacosamide was added, though subsequent EEG showed some degree of persistent seizure activity, and further brief clinical seizures were observed.  Neurologist discussed risk/benefit of a possible third antiepileptic medication (which could carry a risk of heavier sedation), the family elected to remain on  the two current medications for quality of life reasons.  - Neurology consulted, appreciate thoughtful input  - Continue levetiracetam 1250 mg BID   - Lacosamide was started with 200mg IV loading dose (complete) then 100mg IV BID   - transitioned to PO on 10/16   - Could increase to 150mg BID if increased seizure burden but stable on current dosing   - May reconsider third agent should seizure burden change drastically (ie Grand Mal, etc)   - Lorazepam PRN for any seizure activity > 2 minutes or increased clusters of seizure like activity   - Seizure precautions   - Neuro checks     H/o L frontoparietal glioblastoma s/p resection, chemotherapy and radiation (completed May 2021), with recurrence of malignancy 2023   Cognitive and functional impairment due to glioblastoma and chemoradiation   Seen by oncology on this visit, repeat imaging stable from September. Started on trial of steroid, eval symptom improvement. Patient has developed worsening difficulty with swallowing, now eating more.   - Oncology consulted, appreciate input  - Dexamethasone 4mg iv BID  (10/9 - 16)   - Dexamethasone 2mg iv BID (10/17 - )   - Onc following, dose reduced starting 10/17, further dosing as per oncoogy   - Transferred to [place] for bevacizumab   - TMP-SMX daily for PCP PPX (pt had hx of PJP)   - Rad/Onc consulted, RT started on 10/12 for 10 fractions    - No transportation benefit if this were needed from TCU  - PT/OT consulted, appreciate input     Likely aspiration pneumonitis  Coughing with medications, significant water suctioned.  Felt a little short of breath afterwords, which she felt improved with a duoneb.  On exam, she does have scattered bilateral wheezes present.   - DuoNeb QID PRN   - SLP consult completed, appreciate input. Soft diet level 6 with thin liquids.    Depression   - Continue PTA Buspirone and Fluoxetine     H/o DVT   - Continue PTA rivaroxaban     HTN   - Continue PTA Amlodipine     Hemorrhoids   -topical  preparation H  -monitor           Diet: Soft & Bite Sized Diet (level 6) Thin Liquids (level 0)  Snacks/Supplements Adult: Other; Send Berry Magic Cup with dinner tray and allow pt/RN to order prn also (Also likes orange if in stock. NO van or ora); With Meals    DVT Prophylaxis: DOAC  Martino Catheter: Not present  Lines: None     Cardiac Monitoring: None  Code Status: Full Code      Clinically Significant Risk Factors                  # Hypertension: Noted on problem list                   Disposition Plan     Expected Discharge Date: 10/20/2023      Destination: assisted living  Discharge Comments: TCU vs. home          Gary Jacobson DO  Hospitalist Service, GOLD TEAM 10  [facility]  Securely message with [employer] (more info)  Text page via [employer] Paging/Directory   See signed in provider for up to date coverage information  ______________________________________________________________________    Interval History     Transferred from [facility] to [place] yesterday evening. Doing well today. She still feels weak but no other new symptoms, no new signs of seizure symptoms. Paged out to oncology regarding bevacizumab since she is here now.  Continue radiotherapy and dex 2 mg IV BID.    Physical Exam   Vital Signs: Temp: 98.3  F (36.8  C) Temp src: Axillary BP: 128/63 Pulse: 67   Resp: 16 SpO2: 95 % O2 Device: None (Room air)    Weight: 0 lbs 0 oz - not recorded this date    General Appearance: Alert and oriented in no acute distress  Respiratory: clear to auscultation bilaterally   Cardiovascular: regular   GI: soft nontender   Skin: warm   Neuro: Coarse resting tremor BUE  Other: Moving four extremities spontaneously. Unable to ambulate.      Medical Decision Making       55 MINUTES SPENT BY ME on the date of service doing chart review, history, exam, documentation & further activities per the note.      Data

## 2023-10-18 NOTE — PLAN OF CARE
"/66 (BP Location: Right arm)   Pulse 84   Temp 98.7  F (37.1  C) (Axillary)   Resp 18   Ht 1.702 m (5' 7\")   Wt 73.2 kg (161 lb 6 oz)   SpO2 92%   BMI 25.28 kg/m    VSS, AOx4, stayed in bed this shift but did work with PT to use sit to stand machine.  Received bevacizumab without issue this shift, new PIV obtained prior.  Eating well, and able to tolerate meds in applesauce.  Continue POC.   Problem: Plan of Care - These are the overarching goals to be used throughout the patient stay.    Goal: Plan of Care Review  Description: The Plan of Care Review/Shift note should be completed every shift.  The Outcome Evaluation is a brief statement about your assessment that the patient is improving, declining, or no change.  This information will be displayed automatically on your shift note.  Outcome: Progressing  Flowsheets (Taken 10/18/2023 1845)  Plan of Care Reviewed With:   patient   spouse  Overall Patient Progress: no change     Problem: Seizure Disorder Comorbidity  Goal: Maintenance of Seizure Control  Outcome: Progressing     Problem: Hypertension Comorbidity  Goal: Blood Pressure in Desired Range  Outcome: Progressing     Problem: Thought Process Alteration  Goal: Optimal Thought Clarity  Outcome: Progressing     Problem: Communication Impairment  Goal: Effective Communication Skills  Outcome: Progressing  Intervention: Optimize Communication Skills  Recent Flowsheet Documentation  Taken 10/18/2023 0800 by Yossi Peralta RN  Communication Enhancement Strategies:   call light answered in person   family involved in communication plan     Problem: Swallowing Impairment  Goal: Optimal Eating/Swallowing without Aspiration  Outcome: Progressing   Goal Outcome Evaluation:      Plan of Care Reviewed With: patient, spouse    Overall Patient Progress: no changeOverall Patient Progress: no change           "

## 2023-10-18 NOTE — PROGRESS NOTES
Brief Medicine Cross-Cover Note:    Patient arrived from [facility] to [facility]. Hemodynamically stable. No new complaints. Reporting swallowing and strength getting a little better. She is scheduled for radiation in the morning at 0830. Appreciate oncology follow - decadron dose decreased and possible plan for starting bevacizumab. Continue current anti-epileptics. Continue neuro checks and seizure precautions. Due for a nebulizer at 0200.   - Please see progress note from Dr. Crocker from earlier today for further details.    Nimesh Child PA-C on 10/17/2023 at 10:12 PM

## 2023-10-18 NOTE — PLAN OF CARE
"Shift [id number]    Vitals: VSS, afebrile  Neuro: A/O x 4 Call light appropriate.    Cardiac:  Reg heart sounds         Respiratory:  O2 saturation > 92% on RA.   GI/:  Adequate UO via purewick, LBM 10/15  Diet/appetite:  level 6 soft bites size diet, thin liquids and mix /crush pills in applesauce   Activity:  2 assist and ceiling lift   Pain: denies pains    Skin: Intact, small skin tags on back  LDA's: L PIV running LR@ 75ml/hr    Plan:  monitor for changes and report tthem to the tx team  Problem: Plan of Care - These are the overarching goals to be used throughout the patient stay.    Goal: Plan of Care Review  Description: The Plan of Care Review/Shift note should be completed every shift.  The Outcome Evaluation is a brief statement about your assessment that the patient is improving, declining, or no change.  This information will be displayed automatically on your shift note.  Outcome: Not Progressing  Flowsheets (Taken 10/18/2023 0354)  Plan of Care Reviewed With: patient  Goal: Patient-Specific Goal (Individualized)  Description: You can add care plan individualizations to a care plan. Examples of Individualization might be:  \"Parent requests to be called daily at 9am for status\", \"I have a hard time hearing out of my right ear\", or \"Do not touch me to wake me up as it startles me\".  Recent Flowsheet Documentation  Taken 10/17/2023 2100 by Stpehanie Whitaker, RN  Individualized Care Needs: none  Anxieties, Fears or Concerns: none  Goal: Absence of Hospital-Acquired Illness or Injury  Intervention: Identify and Manage Fall Risk  Recent Flowsheet Documentation  Taken 10/18/2023 0100 by Stephanie Whitaker, RN  Safety Promotion/Fall Prevention:   assistive device/personal items within reach   clutter free environment maintained   room near nurse's station  Taken 10/17/2023 2130 by Stephanie Whitaker, RN  Safety Promotion/Fall Prevention:   assistive device/personal items within reach   clutter free " environment maintained   room near nurse's station  Intervention: Prevent Skin Injury  Recent Flowsheet Documentation  Taken 10/17/2023 2130 by Stephanie Whitaker RN  Body Position: supine  Intervention: Prevent and Manage VTE (Venous Thromboembolism) Risk  Recent Flowsheet Documentation  Taken 10/17/2023 2130 by Stephanie Whitaker RN  VTE Prevention/Management: SCDs (sequential compression devices) off  Goal: Optimal Comfort and Wellbeing  Intervention: Monitor Pain and Promote Comfort  Recent Flowsheet Documentation  Taken 10/18/2023 0300 by Stephanie Whitaker RN  Pain Management Interventions: medication (see MAR)  Taken 10/18/2023 0100 by Stephanie Whitaker RN  Pain Management Interventions: medication (see MAR)  Taken 10/17/2023 2111 by Stephanie Whitaker RN  Pain Management Interventions: medication (see MAR)  Taken 10/17/2023 2100 by Stephanie Whitaker RN  Pain Management Interventions: medication (see MAR)  Goal: Readiness for Transition of Care  Recent Flowsheet Documentation  Taken 10/17/2023 2100 by Stephanie Whitaker RN  Transportation Anticipated: health plan transportation  Intervention: Mutually Develop Transition Plan  Recent Flowsheet Documentation  Taken 10/17/2023 2100 by Stephanie Whitaker RN  Transportation Anticipated: health plan transportation  Transportation Concerns: none  Patient/Family Anticipated Services at Transition:   home health care   skilled nursing   rehabilitation services  Patient/Family Anticipates Transition to: assisted living  Equipment Currently Used at Home:   grab bar, toilet   grab bar, tub/shower   hospital bed   shower chair   walker, rolling   wheelchair, manual     Problem: Seizure Disorder Comorbidity  Goal: Maintenance of Seizure Control  Intervention: Maintain Seizure-Symptom Control  Recent Flowsheet Documentation  Taken 10/18/2023 0100 by Stephanie Whitaker RN  Seizure Precautions:   emergency equipment at bedside   side rails  padded  Taken 10/17/2023 2130 by Stephanie Whitaker RN  Seizure Precautions:   emergency equipment at bedside   side rails padded     Problem: Hypertension Comorbidity  Goal: Blood Pressure in Desired Range  Intervention: Maintain Blood Pressure Management  Recent Flowsheet Documentation  Taken 10/18/2023 0100 by Stephanie Whitaker RN  Medication Review/Management: medications reviewed  Taken 10/17/2023 2130 by Stephanie Whitaker RN  Medication Review/Management: medications reviewed     Problem: Thought Process Alteration  Goal: Optimal Thought Clarity  Intervention: Minimize Safety Risk and Altered Thought  Recent Flowsheet Documentation  Taken 10/18/2023 0300 by Stephanie Whitaker RN  Sensory Stimulation Regulation:   auditory stimulation minimized   quiet environment promoted   visual stimulation minimized  Taken 10/18/2023 0100 by Stephanie Whitaker RN  Sensory Stimulation Regulation:   auditory stimulation minimized   quiet environment promoted   visual stimulation minimized  Enhanced Safety Measures: room near unit station  Taken 10/17/2023 2130 by Stephanie Whitaker RN  Sensory Stimulation Regulation:   auditory stimulation minimized   quiet environment promoted   visual stimulation minimized  Enhanced Safety Measures: room near unit station     Problem: Swallowing Impairment  Goal: Optimal Eating/Swallowing without Aspiration  Intervention: Optimize Eating and Swallowing  Recent Flowsheet Documentation  Taken 10/18/2023 0100 by Stephanie Whitaker RN  Aspiration Precautions:   liquids thickened   respiratory status monitored   upright posture maintained  Taken 10/17/2023 2130 by Stephanie Whitaker RN  Aspiration Precautions:   liquids thickened   respiratory status monitored   upright posture maintained     Problem: Communication Impairment  Goal: Effective Communication Skills  Intervention: Optimize Communication Skills  Recent Flowsheet Documentation  Taken 10/18/2023 0300 by  Stephanie Whitaker, RN  Communication Enhancement Strategies: call light answered in person  Taken 10/18/2023 0100 by Stephanie Whitaker, RN  Communication Enhancement Strategies: call light answered in person  Taken 10/17/2023 2130 by Stephanie Whitaker, RN  Communication Enhancement Strategies: call light answered in person   Goal Outcome Evaluation:      Plan of Care Reviewed With: patient

## 2023-10-18 NOTE — PROGRESS NOTES
Admission 10/17  Admitted from: [facility]  Via: stretcher   Accompanied by: family   Belongings: Placed in closet; valuables sent home with family.   Admission paperwork: complete.   Teaching: Call don't fall, use of console, meal times, visiting hours, orientation to unit, when to call for the RN (angina/sob/dizzyness, etc.), and stressed the importance of safety.   Access: L PIV running LR @75 ml /hr  Skin checked performed by 2 RNs Mikel Boyce and Jodi Whitaker. Only notice small skin tags on back otherwise intact.

## 2023-10-19 ENCOUNTER — OFFICE VISIT (OUTPATIENT)
Dept: RADIATION ONCOLOGY | Facility: CLINIC | Age: 78
End: 2023-10-19
Attending: STUDENT IN AN ORGANIZED HEALTH CARE EDUCATION/TRAINING PROGRAM
Payer: MEDICARE

## 2023-10-19 ENCOUNTER — APPOINTMENT (OUTPATIENT)
Dept: SPEECH THERAPY | Facility: CLINIC | Age: 78
DRG: 054 | End: 2023-10-19
Attending: INTERNAL MEDICINE
Payer: MEDICARE

## 2023-10-19 ENCOUNTER — APPOINTMENT (OUTPATIENT)
Dept: GENERAL RADIOLOGY | Facility: CLINIC | Age: 78
DRG: 054 | End: 2023-10-19
Attending: INTERNAL MEDICINE
Payer: MEDICARE

## 2023-10-19 ENCOUNTER — APPOINTMENT (OUTPATIENT)
Dept: PHYSICAL THERAPY | Facility: CLINIC | Age: 78
DRG: 054 | End: 2023-10-19
Attending: STUDENT IN AN ORGANIZED HEALTH CARE EDUCATION/TRAINING PROGRAM
Payer: MEDICARE

## 2023-10-19 DIAGNOSIS — C71.9 GLIOBLASTOMA (H): Primary | ICD-10-CM

## 2023-10-19 LAB
ANION GAP SERPL CALCULATED.3IONS-SCNC: 10 MMOL/L (ref 7–15)
BUN SERPL-MCNC: 21.1 MG/DL (ref 8–23)
CALCIUM SERPL-MCNC: 9.3 MG/DL (ref 8.8–10.2)
CHLORIDE SERPL-SCNC: 103 MMOL/L (ref 98–107)
CREAT SERPL-MCNC: 0.87 MG/DL (ref 0.51–0.95)
DEPRECATED HCO3 PLAS-SCNC: 25 MMOL/L (ref 22–29)
EGFRCR SERPLBLD CKD-EPI 2021: 68 ML/MIN/1.73M2
GLUCOSE SERPL-MCNC: 102 MG/DL (ref 70–99)
HOLD SPECIMEN: NORMAL
MAGNESIUM SERPL-MCNC: 1.9 MG/DL (ref 1.7–2.3)
PHOSPHATE SERPL-MCNC: 3.8 MG/DL (ref 2.5–4.5)
POTASSIUM SERPL-SCNC: 4.5 MMOL/L (ref 3.4–5.3)
SODIUM SERPL-SCNC: 138 MMOL/L (ref 135–145)

## 2023-10-19 PROCEDURE — 84100 ASSAY OF PHOSPHORUS: CPT | Performed by: STUDENT IN AN ORGANIZED HEALTH CARE EDUCATION/TRAINING PROGRAM

## 2023-10-19 PROCEDURE — 258N000003 HC RX IP 258 OP 636: Performed by: STUDENT IN AN ORGANIZED HEALTH CARE EDUCATION/TRAINING PROGRAM

## 2023-10-19 PROCEDURE — 99233 SBSQ HOSP IP/OBS HIGH 50: CPT | Performed by: STUDENT IN AN ORGANIZED HEALTH CARE EDUCATION/TRAINING PROGRAM

## 2023-10-19 PROCEDURE — 250N000011 HC RX IP 250 OP 636: Mod: JZ | Performed by: INTERNAL MEDICINE

## 2023-10-19 PROCEDURE — 250N000013 HC RX MED GY IP 250 OP 250 PS 637: Performed by: STUDENT IN AN ORGANIZED HEALTH CARE EDUCATION/TRAINING PROGRAM

## 2023-10-19 PROCEDURE — 97530 THERAPEUTIC ACTIVITIES: CPT | Mod: GP | Performed by: PHYSICAL THERAPIST

## 2023-10-19 PROCEDURE — 74230 X-RAY XM SWLNG FUNCJ C+: CPT

## 2023-10-19 PROCEDURE — 77386 HC IMRT TREATMENT DELIVERY, COMPLEX: CPT | Performed by: STUDENT IN AN ORGANIZED HEALTH CARE EDUCATION/TRAINING PROGRAM

## 2023-10-19 PROCEDURE — 250N000011 HC RX IP 250 OP 636: Performed by: STUDENT IN AN ORGANIZED HEALTH CARE EDUCATION/TRAINING PROGRAM

## 2023-10-19 PROCEDURE — 94640 AIRWAY INHALATION TREATMENT: CPT | Mod: 76

## 2023-10-19 PROCEDURE — 74230 X-RAY XM SWLNG FUNCJ C+: CPT | Mod: 26 | Performed by: RADIOLOGY

## 2023-10-19 PROCEDURE — 250N000009 HC RX 250: Performed by: STUDENT IN AN ORGANIZED HEALTH CARE EDUCATION/TRAINING PROGRAM

## 2023-10-19 PROCEDURE — 94640 AIRWAY INHALATION TREATMENT: CPT

## 2023-10-19 PROCEDURE — 250N000013 HC RX MED GY IP 250 OP 250 PS 637: Performed by: INTERNAL MEDICINE

## 2023-10-19 PROCEDURE — 92526 ORAL FUNCTION THERAPY: CPT | Mod: GN

## 2023-10-19 PROCEDURE — 83735 ASSAY OF MAGNESIUM: CPT | Performed by: STUDENT IN AN ORGANIZED HEALTH CARE EDUCATION/TRAINING PROGRAM

## 2023-10-19 PROCEDURE — 999N000157 HC STATISTIC RCP TIME EA 10 MIN

## 2023-10-19 PROCEDURE — 36415 COLL VENOUS BLD VENIPUNCTURE: CPT | Performed by: STUDENT IN AN ORGANIZED HEALTH CARE EDUCATION/TRAINING PROGRAM

## 2023-10-19 PROCEDURE — 250N000011 HC RX IP 250 OP 636: Mod: JZ | Performed by: STUDENT IN AN ORGANIZED HEALTH CARE EDUCATION/TRAINING PROGRAM

## 2023-10-19 PROCEDURE — 80048 BASIC METABOLIC PNL TOTAL CA: CPT | Performed by: STUDENT IN AN ORGANIZED HEALTH CARE EDUCATION/TRAINING PROGRAM

## 2023-10-19 PROCEDURE — 92611 MOTION FLUOROSCOPY/SWALLOW: CPT | Mod: GN

## 2023-10-19 PROCEDURE — 120N000005 HC R&B MS OVERFLOW UMMC

## 2023-10-19 RX ORDER — BARIUM SULFATE 400 MG/ML
SUSPENSION ORAL ONCE
Status: DISCONTINUED | OUTPATIENT
Start: 2023-10-19 | End: 2023-10-19

## 2023-10-19 RX ORDER — BARIUM SULFATE 400 MG/ML
SUSPENSION ORAL ONCE
Status: COMPLETED | OUTPATIENT
Start: 2023-10-19 | End: 2023-10-19

## 2023-10-19 RX ORDER — ACETAMINOPHEN 500 MG
1000 TABLET ORAL EVERY 8 HOURS PRN
Status: DISCONTINUED | OUTPATIENT
Start: 2023-10-19 | End: 2023-11-28 | Stop reason: HOSPADM

## 2023-10-19 RX ORDER — POLYETHYLENE GLYCOL 3350 17 G/17G
17 POWDER, FOR SOLUTION ORAL DAILY
Status: DISCONTINUED | OUTPATIENT
Start: 2023-10-19 | End: 2023-10-21

## 2023-10-19 RX ORDER — DEXAMETHASONE SODIUM PHOSPHATE 10 MG/ML
4 INJECTION, SOLUTION INTRAMUSCULAR; INTRAVENOUS EVERY 12 HOURS
Status: DISCONTINUED | OUTPATIENT
Start: 2023-10-19 | End: 2023-10-25

## 2023-10-19 RX ORDER — DEXAMETHASONE SODIUM PHOSPHATE 10 MG/ML
2 INJECTION, SOLUTION INTRAMUSCULAR; INTRAVENOUS ONCE
Status: COMPLETED | OUTPATIENT
Start: 2023-10-19 | End: 2023-10-19

## 2023-10-19 RX ADMIN — THERA TABS 1 TABLET: TAB at 07:46

## 2023-10-19 RX ADMIN — ALBUTEROL SULFATE 2.5 MG: 2.5 SOLUTION RESPIRATORY (INHALATION) at 13:34

## 2023-10-19 RX ADMIN — BUSPIRONE HYDROCHLORIDE 30 MG: 30 TABLET ORAL at 20:33

## 2023-10-19 RX ADMIN — AMLODIPINE BESYLATE 5 MG: 5 TABLET ORAL at 07:46

## 2023-10-19 RX ADMIN — FLUOXETINE HYDROCHLORIDE 40 MG: 20 SOLUTION ORAL at 07:46

## 2023-10-19 RX ADMIN — ALBUTEROL SULFATE 2.5 MG: 2.5 SOLUTION RESPIRATORY (INHALATION) at 21:09

## 2023-10-19 RX ADMIN — SENNOSIDES AND DOCUSATE SODIUM 1 TABLET: 50; 8.6 TABLET ORAL at 07:46

## 2023-10-19 RX ADMIN — POLYETHYLENE GLYCOL 3350 17 G: 17 POWDER, FOR SOLUTION ORAL at 07:47

## 2023-10-19 RX ADMIN — BUSPIRONE HYDROCHLORIDE 30 MG: 30 TABLET ORAL at 07:46

## 2023-10-19 RX ADMIN — ALBUTEROL SULFATE 2.5 MG: 2.5 SOLUTION RESPIRATORY (INHALATION) at 07:50

## 2023-10-19 RX ADMIN — RIVAROXABAN 20 MG: 10 TABLET, FILM COATED ORAL at 17:22

## 2023-10-19 RX ADMIN — SENNOSIDES AND DOCUSATE SODIUM 1 TABLET: 50; 8.6 TABLET ORAL at 20:33

## 2023-10-19 RX ADMIN — Medication 40 MG: at 17:22

## 2023-10-19 RX ADMIN — BARIUM SULFATE 8 ML: 400 SUSPENSION ORAL at 15:34

## 2023-10-19 RX ADMIN — LEVETIRACETAM 1250 MG: 100 SOLUTION ORAL at 20:32

## 2023-10-19 RX ADMIN — ONDANSETRON 4 MG: 4 TABLET, FILM COATED ORAL at 09:25

## 2023-10-19 RX ADMIN — LEVETIRACETAM 1250 MG: 100 SOLUTION ORAL at 07:46

## 2023-10-19 RX ADMIN — SODIUM CHLORIDE, POTASSIUM CHLORIDE, SODIUM LACTATE AND CALCIUM CHLORIDE: 600; 310; 30; 20 INJECTION, SOLUTION INTRAVENOUS at 21:08

## 2023-10-19 RX ADMIN — SODIUM CHLORIDE, POTASSIUM CHLORIDE, SODIUM LACTATE AND CALCIUM CHLORIDE: 600; 310; 30; 20 INJECTION, SOLUTION INTRAVENOUS at 06:36

## 2023-10-19 RX ADMIN — DEXAMETHASONE SODIUM PHOSPHATE 2 MG: 10 INJECTION, SOLUTION INTRAMUSCULAR; INTRAVENOUS at 11:40

## 2023-10-19 RX ADMIN — Medication 40 MG: at 07:46

## 2023-10-19 RX ADMIN — DEXAMETHASONE SODIUM PHOSPHATE 4 MG: 10 INJECTION, SOLUTION INTRAMUSCULAR; INTRAVENOUS at 20:33

## 2023-10-19 RX ADMIN — DEXAMETHASONE SODIUM PHOSPHATE 2 MG: 10 INJECTION, SOLUTION INTRAMUSCULAR; INTRAVENOUS at 07:46

## 2023-10-19 RX ADMIN — ACETAMINOPHEN 500 MG: 325 TABLET, FILM COATED ORAL at 09:25

## 2023-10-19 RX ADMIN — SULFAMETHOXAZOLE AND TRIMETHOPRIM 1 TABLET: 800; 160 TABLET ORAL at 07:46

## 2023-10-19 RX ADMIN — LACOSAMIDE 100 MG: 50 TABLET, FILM COATED ORAL at 07:46

## 2023-10-19 RX ADMIN — LACOSAMIDE 100 MG: 50 TABLET, FILM COATED ORAL at 20:32

## 2023-10-19 ASSESSMENT — ACTIVITIES OF DAILY LIVING (ADL)
ADLS_ACUITY_SCORE: 59

## 2023-10-19 ASSESSMENT — PAIN SCALES - GENERAL: PAINLEVEL: MILD PAIN (2)

## 2023-10-19 NOTE — PLAN OF CARE
"Goal Outcome Evaluation:      Plan of Care Reviewed With: patient, spouse    Overall Patient Progress: no changeOverall Patient Progress: no change         ./78 (BP Location: Right arm)   Pulse 69   Temp 98.3  F (36.8  C) (Axillary)   Resp 16   Ht 1.702 m (5' 7\")   Wt 73.2 kg (161 lb 6 oz)   SpO2 95%   BMI 25.28 kg/m      Pt alert and oriented x4. Neuros unchanged. Avss on room air. Denies pain or nausea. Soft touch call light within reach. Seizure pads in place. Taking pills with apple sauce, tolerating well. Turn/repo q2h. Using pure wick external catheter, working well and with good UOP. Mivf-LR at 75ml/hr. Cont with poc.    Problem: Plan of Care - These are the overarching goals to be used throughout the patient stay.    Goal: Plan of Care Review  Description: The Plan of Care Review/Shift note should be completed every shift.  The Outcome Evaluation is a brief statement about your assessment that the patient is improving, declining, or no change.  This information will be displayed automatically on your shift note.  Outcome: Not Progressing  Flowsheets (Taken 10/19/2023 0617)  Plan of Care Reviewed With:   patient   spouse  Overall Patient Progress: no change     "

## 2023-10-19 NOTE — PROGRESS NOTES
"Ms. Bailey transferred to the [facility] in order to receive bevacizumab.  She was seen sitting up in bed.  She is in good spirits.  She is tolerating radiation well.  Denies new headache, nausea, changes in speech, etc.  She reports a history of constipation and notes no bowel movement x 4-5 days, however, nursing staff notes a large bowel movement yesterday was documented.    Physical exam:  /63 (BP Location: Right arm)   Pulse 78   Temp 98.9  F (37.2  C) (Axillary)   Resp 16   Ht 1.702 m (5' 7\")   Wt 73.2 kg (161 lb 6 oz)   SpO2 94%   BMI 25.28 kg/m    General:  Well appearing and in NAD.  HEENT:  EOMI.  Sclera anicteric.  MMM.  Lungs:  CTA bilaterally, mild crackles at bilateral bases.  CV:  RRR.  No audible m/r/g.  Abd:  soft/NT  Extremities:  No pitting edema  Neuro:  Mouth droop.  Bilateral upper extremity tremor.    Laboratories and images reviewed today:    10/7/2023 CT head and neck w/ contrast:  IMPRESSION:   HEAD CT:  1.  Postsurgical changes and posttreatment changes consistent with history of glioblastoma.  2.  3 mm focus of increased attenuation along the anterior margin of the resection cavity appears to correlate with a focus of blooming on the GRE sequence from the most recent comparison brain MRI which would favor a focus of mineralization.  3.  No definite acute intracranial pathology.     HEAD CTA:   1.  No large vessel occlusion or flow-limiting stenosis.  2.  Mild atheromatous changes in the carotid siphons.     NECK CTA:  1.  Normal neck CTA.    10/7/2023 MRI brain w/o and w/ contrast:  IMPRESSION:  1. Postsurgical changes of left frontoparietal mass resections with unchanged nodular enhancing foci along the resection cavity since 9/2023. Stable smaller foci of enhancement along the perisylvian left  frontal operculum.  2. Otherwise, no new or acute intracranial pathology. No acute infarct.  3. Periventricular and subcortical white matter T2 hyperintensity representing chronic " small vessel ischemic disease and/or prior radiation changes.  4. Stable likely meningioma overlying the right occipital lobe.    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     GBM:  - receiving radiation, initiated on 10/12.  A total of 10 treatments are planned.  - bevacizumab 10 mg/kg IV today.  - dexamethasone taper per radiation oncology.  - continue ongoing work with PT and OT.    Plan for chemotherapy discussed with nursing staff, primary medicine team, and pharmacy.    I spent 50 minutes on the date of the encounter doing chart review, review of test results, interpretation of tests, patient visit, documentation, and discussion with other provider(s).

## 2023-10-19 NOTE — PLAN OF CARE
Occupational Therapy Discharge Summary    Reason for therapy discharge:    No further expectations of functional progress.    Progress towards therapy goal(s). See goals on Care Plan in [facility] electronic health record for goal details.  OT initiated and upon further clarification from, pt dependent with ADLs at baseline with Ax1 for EZ stand transfers with facility staff. Pt more appropriate for PT to follow at this time.      Therapy recommendation(s):    No further therapy is recommended.

## 2023-10-19 NOTE — LETTER
10/19/2023         RE: Olga Bailey  [address]  [place] [place] [address]        Dear Colleague,    Thank you for referring your patient, Olga Bailey, to the [facility] RADIATION ONCOLOGY. Please see a copy of my visit note below.    [facility]YS[facility]  SPECIALIZING IN BREAKTHROUGHS  Radiation Oncology    On Treatment Visit Note      Olga Bailey      Date: Oct 19, 2023   MRN: [id number]   : 1945       Reason for Visit:  On Radiation Treatment Visit     Treatment Summary to Date   LtFrontReTx   2100 cGy 6/10   Total dose 3500 Gy      Subjective:   Olga is feeling well today. She arrived from in upstairs and is generally doing well.     Objective:   There were no vitals taken for this visit.  Gen: Appears well, in no acute distress  Skin: No erythema    Labs:  CBC RESULTS: Recent Labs   Lab Test 10/14/23  0731   WBC 8.8   RBC 3.22*   HGB 9.6*   HCT 29.5*   MCV 92   MCH 29.8   MCHC 32.5   RDW 13.1        ELECTROLYTES:  Recent Labs   Lab Test 10/14/23  0731      POTASSIUM 3.8   CHLORIDE 104   ESTIVEN 9.2   CO2 25   BUN 17.1   CR 0.79   *       Assessment:    Tolerating radiation therapy well.  All questions and concerns addressed.    Toxicities:  None    Plan:   Continue current therapy.  Can continue as outpt when discharged.   Follow up by video visit in 4 weeks       Mosaiq chart and setup information reviewed  MVCT reviewed      Betsy Spann MD, PhD     Department of Radiation Oncology  [facility]

## 2023-10-19 NOTE — PROGRESS NOTES
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     Recurrent glioblastoma (IDH wild-type, MGMT promoter methylated) currently under treatment with radiation and concurrent bevacizumab (first dose given 10/18/2023), tolerating treatment well overall.    Recommendations:  - Continue radiation treatment as planned  - Steroid dosing and taper per radiation oncology team  - Message sent and order placed to schedule appointment on 11/1/2023 for next dose of bevacizumab (Dose 10 mg/kg every 2 weeks until progression per NRT oncology/[id number] study).     Oncology service will sign-off. Please page/call if any questions arise.  Please notify oncology service if the patient remains hospitalized past the date (11/1/2023).    Dodie Farmer MD  Hematology/Medical Oncology/BMT (PGY-5)  P: [phone]

## 2023-10-19 NOTE — PLAN OF CARE
"/58 (BP Location: Left arm)   Pulse 77   Temp 98.6  F (37  C) (Axillary)   Resp 16   Ht 1.702 m (5' 7\")   Wt 73.2 kg (161 lb 6 oz)   SpO2 94%   BMI 25.28 kg/m    VSS, AOx4, denies n/v/d and pain.  Pt had radiation today as well as video swallow study done.  External catheter replaced post swallow study at around 1600.  Pt eating and drinking well.  Pt's spouse requesting to have diet advanced as \"swallow study appears to have gone well\".  Spouse at bedside and attentive to Pt's needs.  Continue POC.  Problem: Plan of Care - These are the overarching goals to be used throughout the patient stay.    Goal: Plan of Care Review  Description: The Plan of Care Review/Shift note should be completed every shift.  The Outcome Evaluation is a brief statement about your assessment that the patient is improving, declining, or no change.  This information will be displayed automatically on your shift note.  Outcome: Progressing  Goal: Absence of Hospital-Acquired Illness or Injury  Intervention: Identify and Manage Fall Risk  Recent Flowsheet Documentation  Taken 10/19/2023 0800 by Yossi Peralta RN  Safety Promotion/Fall Prevention: safety round/check completed  Intervention: Prevent Skin Injury  Recent Flowsheet Documentation  Taken 10/19/2023 0800 by Yossi Peralta RN  Body Position:   turned   heels elevated   weight shifting     Problem: Seizure Disorder Comorbidity  Goal: Maintenance of Seizure Control  Outcome: Progressing  Intervention: Maintain Seizure-Symptom Control  Recent Flowsheet Documentation  Taken 10/19/2023 0800 by Yossi Peralta RN  Seizure Precautions:   emergency equipment at bedside   side rails padded     Problem: Hypertension Comorbidity  Goal: Blood Pressure in Desired Range  Outcome: Progressing  Intervention: Maintain Blood Pressure Management  Recent Flowsheet Documentation  Taken 10/19/2023 0800 by Yossi Peralta RN  Medication Review/Management: medications reviewed   "   Problem: Thought Process Alteration  Goal: Optimal Thought Clarity  Outcome: Progressing  Intervention: Minimize Safety Risk and Altered Thought  Recent Flowsheet Documentation  Taken 10/19/2023 0800 by Yossi Peralta RN  Sensory Stimulation Regulation:   auditory stimulation minimized   quiet environment promoted   visual stimulation minimized  Enhanced Safety Measures: room near unit station     Problem: Swallowing Impairment  Goal: Optimal Eating/Swallowing without Aspiration  Outcome: Progressing  Intervention: Optimize Eating and Swallowing  Recent Flowsheet Documentation  Taken 10/19/2023 0800 by Yossi Peralta RN  Aspiration Precautions:   liquids thickened   respiratory status monitored   upright posture maintained   awake/alert before oral intake  Feeding/Eating Techniques: feeding assistance provided     Problem: Communication Impairment  Goal: Effective Communication Skills  Outcome: Progressing  Intervention: Optimize Communication Skills  Recent Flowsheet Documentation  Taken 10/19/2023 0800 by Yossi Peralta RN  Communication Enhancement Strategies:   call light answered in person   family involved in communication plan   Goal Outcome Evaluation:

## 2023-10-19 NOTE — PROGRESS NOTES
[facility]    Medicine Progress Note - Hospitalist Service, GOLD TEAM 10    Date of Admission:  10/6/2023    Assessment & Plan   Olga Bailey is a 78 year old female admitted on 10/6/2023. She has history of L frontoparietal glioblastoma s/p resection,chemotherapy and radiation with remission in 2021, recently found to have recurrence in September 2023, with baseline cognitive and functional impairment, h/o seizures, h/o DVT on xarelto, HTN, and depression who presented 10/6/23 with acute worsening of baseline aphasia as well as new facial twitching. Admitted to internal medicine for further work up and management. Neurology consulted and patient found to be having partial seizures for which lacosamide was added to keppra with improvement in seizure activity. Continued radiotherapy while on [facility] but has now transferred to [facility] for concomitant bevacizumab.    Partial Seizure, previously controlled  Worsened chronic aphasia   Difficulty Swallowing  Presented with acute (< 1 day) worsening of aphasia and new facial twitching with abnormal mouth movements concerning for partial seizure.  She underwent stroke eval in the ED which was reassuring against acute CVA,   Given recent recurrence of glioblastoma, there was concern for spread or advance of disease, though both CT and MRI were reassuring to stability of current malignancy from last imaging (9/2023).  No metabolic derangements to explain acute change, and norm WBC with no focal findings was reassuring against acute infection.  Neurology consulted and felt movements were consistent with partial seizure, lacosamide was added, though subsequent EEG showed some degree of persistent seizure activity, and further brief clinical seizures were observed.  Neurologist discussed risk/benefit of a possible third antiepileptic medication (which could carry a risk of heavier sedation), the family elected to remain on  the two current medications for quality of life reasons.  - Neurology consulted, appreciate thoughtful input  - Continue levetiracetam 1250 mg BID   - Lacosamide was started with 200mg IV loading dose (complete) then 100mg IV BID   - transitioned to PO on 10/16   - Could increase to 150mg BID if increased seizure burden but stable on current dosing   - May reconsider third agent should seizure burden change drastically (ie Grand Mal, etc)   - Lorazepam PRN for any seizure activity > 2 minutes or increased clusters of seizure like activity   - Seizure precautions   - Neuro checks     H/o L frontoparietal glioblastoma s/p resection, chemotherapy and radiation (completed May 2021), with recurrence of malignancy 2023   Cognitive and functional impairment due to glioblastoma and chemoradiation   Seen by oncology on this visit, repeat imaging stable from September. Started on trial of steroid, eval symptom improvement. Patient has developed worsening difficulty with swallowing, now eating more. Reduced decadron to 2 mg IV BID from 4 mg BID on 10/17 however patient developed worsening headache on 10/19 so dose increased.  - Oncology consulted, appreciate input  - Dexamethasone 4mg iv BID  (10/9 -  )  - S/p bevacizumab 10/18   - TMP-SMX daily for PCP PPX (pt had hx of PJP)   - Rad/Onc consulted, RT started on 10/12 for 10 fractions    - No transportation benefit if this were needed from TCU  - PT/OT consulted, appreciate input     Likely aspiration pneumonitis  Coughing with medications, significant water suctioned.  Felt a little short of breath afterwords, which she felt improved with a duoneb.  On exam, she does have scattered bilateral wheezes present.   - DuoNeb QID PRN   - SLP consult completed, appreciate input. Soft diet level 6 with thin liquids.    Depression   - Continue PTA Buspirone and Fluoxetine     H/o DVT   - Continue PTA rivaroxaban     HTN   - Continue PTA Amlodipine     Hemorrhoids   -topical preparation  "H  -monitor           Diet: Soft & Bite Sized Diet (level 6) Thin Liquids (level 0)  Snacks/Supplements Adult: Other; Send Berry Magic Cup with dinner tray and allow pt/RN to order prn also (Also likes orange if in stock. NO van or ora); With Meals    DVT Prophylaxis: DOAC  Martino Catheter: Not present  Lines: None     Cardiac Monitoring: None  Code Status: Full Code      Clinically Significant Risk Factors                  # Hypertension: Noted on problem list        # Overweight: Estimated body mass index is 25.28 kg/m  as calculated from the following:    Height as of this encounter: 1.702 m (5' 7\").    Weight as of this encounter: 73.2 kg (161 lb 6 oz).             Disposition Plan      Expected Discharge Date: 10/23/2023      Destination: assisted living  Discharge Comments: TCU vs. home once done w/ radiation          Gary Jacobson DO  Hospitalist Service, GOLD TEAM 10  [facility]  Securely message with [employer] (more info)  Text page via [email or web address] Paging/Directory   See signed in provider for up to date coverage information  ______________________________________________________________________    Interval History     Developed a headache overnight and some nausea. She endorsed it feeling a little better today however her partner notes that she tends to downplay how she is feeling to not be burdensome and she still has the headache. Discussed with rad onc and plan to increase the decadron back up to 4 mg IV BID. Otherwise continuing radiation, and working with therapies.    Physical Exam   Vital Signs: Temp: 97.7  F (36.5  C) Temp src: Axillary BP: 136/77 Pulse: 69   Resp: 16 SpO2: 95 % O2 Device: None (Room air)    Weight: 161 lbs 6.03 oz - not recorded this date    General Appearance: Alert and oriented in no acute distress  Respiratory: clear to auscultation bilaterally   Cardiovascular: regular   GI: soft nontender   Skin: warm   Neuro: Coarse resting tremor " BUE  Other: Moving four extremities spontaneously. Unable to ambulate.      Medical Decision Making       55 MINUTES SPENT BY ME on the date of service doing chart review, history, exam, documentation & further activities per the note.      Data

## 2023-10-20 ENCOUNTER — APPOINTMENT (OUTPATIENT)
Dept: RADIATION ONCOLOGY | Facility: CLINIC | Age: 78
End: 2023-10-20
Attending: STUDENT IN AN ORGANIZED HEALTH CARE EDUCATION/TRAINING PROGRAM
Payer: MEDICARE

## 2023-10-20 ENCOUNTER — APPOINTMENT (OUTPATIENT)
Dept: PHYSICAL THERAPY | Facility: CLINIC | Age: 78
DRG: 054 | End: 2023-10-20
Attending: INTERNAL MEDICINE
Payer: MEDICARE

## 2023-10-20 LAB
ANION GAP SERPL CALCULATED.3IONS-SCNC: 9 MMOL/L (ref 7–15)
BUN SERPL-MCNC: 22.4 MG/DL (ref 8–23)
CALCIUM SERPL-MCNC: 9.3 MG/DL (ref 8.8–10.2)
CHLORIDE SERPL-SCNC: 103 MMOL/L (ref 98–107)
CREAT SERPL-MCNC: 0.82 MG/DL (ref 0.51–0.95)
DEPRECATED HCO3 PLAS-SCNC: 26 MMOL/L (ref 22–29)
EGFRCR SERPLBLD CKD-EPI 2021: 73 ML/MIN/1.73M2
ERYTHROCYTE [DISTWIDTH] IN BLOOD BY AUTOMATED COUNT: 13.4 % (ref 10–15)
GLUCOSE SERPL-MCNC: 109 MG/DL (ref 70–99)
HCT VFR BLD AUTO: 30.3 % (ref 35–47)
HGB BLD-MCNC: 9.7 G/DL (ref 11.7–15.7)
MAGNESIUM SERPL-MCNC: 2 MG/DL (ref 1.7–2.3)
MCH RBC QN AUTO: 29.7 PG (ref 26.5–33)
MCHC RBC AUTO-ENTMCNC: 32 G/DL (ref 31.5–36.5)
MCV RBC AUTO: 93 FL (ref 78–100)
PHOSPHATE SERPL-MCNC: 3.8 MG/DL (ref 2.5–4.5)
PLATELET # BLD AUTO: 178 10E3/UL (ref 150–450)
POTASSIUM SERPL-SCNC: 4.2 MMOL/L (ref 3.4–5.3)
RBC # BLD AUTO: 3.27 10E6/UL (ref 3.8–5.2)
SODIUM SERPL-SCNC: 138 MMOL/L (ref 135–145)
WBC # BLD AUTO: 8.6 10E3/UL (ref 4–11)

## 2023-10-20 PROCEDURE — 250N000013 HC RX MED GY IP 250 OP 250 PS 637: Performed by: STUDENT IN AN ORGANIZED HEALTH CARE EDUCATION/TRAINING PROGRAM

## 2023-10-20 PROCEDURE — 99232 SBSQ HOSP IP/OBS MODERATE 35: CPT | Performed by: STUDENT IN AN ORGANIZED HEALTH CARE EDUCATION/TRAINING PROGRAM

## 2023-10-20 PROCEDURE — 36415 COLL VENOUS BLD VENIPUNCTURE: CPT | Performed by: STUDENT IN AN ORGANIZED HEALTH CARE EDUCATION/TRAINING PROGRAM

## 2023-10-20 PROCEDURE — 120N000005 HC R&B MS OVERFLOW UMMC

## 2023-10-20 PROCEDURE — 83735 ASSAY OF MAGNESIUM: CPT | Performed by: STUDENT IN AN ORGANIZED HEALTH CARE EDUCATION/TRAINING PROGRAM

## 2023-10-20 PROCEDURE — 250N000011 HC RX IP 250 OP 636: Mod: JZ | Performed by: STUDENT IN AN ORGANIZED HEALTH CARE EDUCATION/TRAINING PROGRAM

## 2023-10-20 PROCEDURE — 97530 THERAPEUTIC ACTIVITIES: CPT | Mod: GP

## 2023-10-20 PROCEDURE — 250N000009 HC RX 250: Performed by: STUDENT IN AN ORGANIZED HEALTH CARE EDUCATION/TRAINING PROGRAM

## 2023-10-20 PROCEDURE — 250N000013 HC RX MED GY IP 250 OP 250 PS 637: Performed by: INTERNAL MEDICINE

## 2023-10-20 PROCEDURE — 85027 COMPLETE CBC AUTOMATED: CPT | Performed by: STUDENT IN AN ORGANIZED HEALTH CARE EDUCATION/TRAINING PROGRAM

## 2023-10-20 PROCEDURE — 77386 HC IMRT TREATMENT DELIVERY, COMPLEX: CPT | Performed by: STUDENT IN AN ORGANIZED HEALTH CARE EDUCATION/TRAINING PROGRAM

## 2023-10-20 PROCEDURE — 80048 BASIC METABOLIC PNL TOTAL CA: CPT | Performed by: STUDENT IN AN ORGANIZED HEALTH CARE EDUCATION/TRAINING PROGRAM

## 2023-10-20 PROCEDURE — 258N000003 HC RX IP 258 OP 636: Performed by: STUDENT IN AN ORGANIZED HEALTH CARE EDUCATION/TRAINING PROGRAM

## 2023-10-20 PROCEDURE — 84100 ASSAY OF PHOSPHORUS: CPT | Performed by: STUDENT IN AN ORGANIZED HEALTH CARE EDUCATION/TRAINING PROGRAM

## 2023-10-20 PROCEDURE — 999N000157 HC STATISTIC RCP TIME EA 10 MIN

## 2023-10-20 RX ADMIN — SODIUM CHLORIDE, POTASSIUM CHLORIDE, SODIUM LACTATE AND CALCIUM CHLORIDE: 600; 310; 30; 20 INJECTION, SOLUTION INTRAVENOUS at 09:01

## 2023-10-20 RX ADMIN — LEVETIRACETAM 1250 MG: 100 SOLUTION ORAL at 10:10

## 2023-10-20 RX ADMIN — SULFAMETHOXAZOLE AND TRIMETHOPRIM 1 TABLET: 800; 160 TABLET ORAL at 08:12

## 2023-10-20 RX ADMIN — Medication 40 MG: at 08:11

## 2023-10-20 RX ADMIN — LACOSAMIDE 100 MG: 50 TABLET, FILM COATED ORAL at 19:39

## 2023-10-20 RX ADMIN — BUSPIRONE HYDROCHLORIDE 30 MG: 30 TABLET ORAL at 19:39

## 2023-10-20 RX ADMIN — BUSPIRONE HYDROCHLORIDE 30 MG: 30 TABLET ORAL at 08:12

## 2023-10-20 RX ADMIN — ALBUTEROL SULFATE 2.5 MG: 2.5 SOLUTION RESPIRATORY (INHALATION) at 09:24

## 2023-10-20 RX ADMIN — SODIUM CHLORIDE, POTASSIUM CHLORIDE, SODIUM LACTATE AND CALCIUM CHLORIDE: 600; 310; 30; 20 INJECTION, SOLUTION INTRAVENOUS at 21:25

## 2023-10-20 RX ADMIN — THERA TABS 1 TABLET: TAB at 08:12

## 2023-10-20 RX ADMIN — POLYETHYLENE GLYCOL 3350 17 G: 17 POWDER, FOR SOLUTION ORAL at 08:11

## 2023-10-20 RX ADMIN — ALBUTEROL SULFATE 2.5 MG: 2.5 SOLUTION RESPIRATORY (INHALATION) at 14:38

## 2023-10-20 RX ADMIN — RIVAROXABAN 20 MG: 10 TABLET, FILM COATED ORAL at 17:36

## 2023-10-20 RX ADMIN — DEXAMETHASONE SODIUM PHOSPHATE 4 MG: 10 INJECTION, SOLUTION INTRAMUSCULAR; INTRAVENOUS at 08:40

## 2023-10-20 RX ADMIN — DEXAMETHASONE SODIUM PHOSPHATE 4 MG: 10 INJECTION, SOLUTION INTRAMUSCULAR; INTRAVENOUS at 19:38

## 2023-10-20 RX ADMIN — SENNOSIDES AND DOCUSATE SODIUM 1 TABLET: 50; 8.6 TABLET ORAL at 19:38

## 2023-10-20 RX ADMIN — SENNOSIDES AND DOCUSATE SODIUM 1 TABLET: 50; 8.6 TABLET ORAL at 08:11

## 2023-10-20 RX ADMIN — AMLODIPINE BESYLATE 5 MG: 5 TABLET ORAL at 08:12

## 2023-10-20 RX ADMIN — LEVETIRACETAM 1250 MG: 100 SOLUTION ORAL at 19:39

## 2023-10-20 RX ADMIN — LACOSAMIDE 100 MG: 50 TABLET, FILM COATED ORAL at 08:11

## 2023-10-20 RX ADMIN — FLUOXETINE HYDROCHLORIDE 40 MG: 20 SOLUTION ORAL at 08:11

## 2023-10-20 RX ADMIN — Medication 40 MG: at 17:36

## 2023-10-20 RX ADMIN — ALBUTEROL SULFATE 2.5 MG: 2.5 SOLUTION RESPIRATORY (INHALATION) at 20:50

## 2023-10-20 ASSESSMENT — ACTIVITIES OF DAILY LIVING (ADL)
ADLS_ACUITY_SCORE: 59
ADLS_ACUITY_SCORE: 63
ADLS_ACUITY_SCORE: 56
ADLS_ACUITY_SCORE: 59

## 2023-10-20 NOTE — PROVIDER NOTIFICATION
"Provider Chris Ng MD text paged via [employer] \"FYI patient is disoriented to time. Previous charting shows alert and oriented x4. No other changes noted.\"  "

## 2023-10-20 NOTE — PLAN OF CARE
"/69 (BP Location: Right arm)   Pulse 76   Temp 98.3  F (36.8  C) (Oral)   Resp 16   Ht 1.702 m (5' 7\")   Wt 73.2 kg (161 lb 6 oz)   SpO2 94%   BMI 25.28 kg/m      VSS on room air except for hypertension; does not meet paging parameters. Alert and oriented x3; disoriented to time; patient reported date as August 18th; patient could not recall the year. Patient denies shortness of breath, chest pain or nausea. External catheter in place; adequate UOP. Spouse at bedside and attentive to patient's needs. Patient had radiation today. Continue with plan of care and notify provider with any changes.     Goal Outcome Evaluation:      Plan of Care Reviewed With: patient, spouse    Overall Patient Progress: improving    Problem: Plan of Care - These are the overarching goals to be used throughout the patient stay.    Goal: Plan of Care Review  Description: The Plan of Care Review/Shift note should be completed every shift.  The Outcome Evaluation is a brief statement about your assessment that the patient is improving, declining, or no change.  This information will be displayed automatically on your shift note.  Outcome: Progressing  Flowsheets (Taken 10/20/2023 1648)  Plan of Care Reviewed With:   patient   spouse  Overall Patient Progress: improving     Problem: Seizure Disorder Comorbidity  Goal: Maintenance of Seizure Control  Outcome: Progressing  Intervention: Maintain Seizure-Symptom Control  Recent Flowsheet Documentation  Taken 10/20/2023 1530 by Deana Lloyd, RN  Seizure Precautions:   emergency equipment at bedside   side rails padded  Taken 10/20/2023 1230 by Deana Lloyd, RN  Seizure Precautions:   emergency equipment at bedside   side rails padded  Taken 10/20/2023 0800 by Deana Lloyd, RN  Seizure Precautions:   emergency equipment at bedside   side rails padded     Problem: Hypertension Comorbidity  Goal: Blood Pressure in Desired Range  Outcome: Progressing  Intervention: Maintain " Blood Pressure Management  Recent Flowsheet Documentation  Taken 10/20/2023 1530 by Deana Lloyd RN  Medication Review/Management: medications reviewed  Taken 10/20/2023 1230 by Deana Lloyd RN  Medication Review/Management: medications reviewed  Taken 10/20/2023 0800 by Deana Lloyd RN  Medication Review/Management: medications reviewed     Problem: Thought Process Alteration  Goal: Optimal Thought Clarity  Outcome: Progressing  Intervention: Minimize Safety Risk and Altered Thought  Recent Flowsheet Documentation  Taken 10/20/2023 1530 by Deana Lloyd RN  Enhanced Safety Measures: room near unit station  Taken 10/20/2023 1230 by Deana Lloyd RN  Enhanced Safety Measures: room near unit station  Taken 10/20/2023 0800 by Deana Lloyd RN  Sensory Stimulation Regulation:   auditory stimulation minimized   quiet environment promoted   visual stimulation minimized  Enhanced Safety Measures: room near unit station     Problem: Swallowing Impairment  Goal: Optimal Eating/Swallowing without Aspiration  Outcome: Progressing  Intervention: Optimize Eating and Swallowing  Recent Flowsheet Documentation  Taken 10/20/2023 1530 by Deana Lloyd RN  Aspiration Precautions:   liquids thickened   respiratory status monitored   upright posture maintained   awake/alert before oral intake  Taken 10/20/2023 1230 by Deana Lloyd RN  Aspiration Precautions:   liquids thickened   respiratory status monitored   upright posture maintained   awake/alert before oral intake  Taken 10/20/2023 0800 by Deana Lloyd RN  Aspiration Precautions:   liquids thickened   respiratory status monitored   upright posture maintained   awake/alert before oral intake  Feeding/Eating Techniques: feeding assistance provided     Problem: Communication Impairment  Goal: Effective Communication Skills  Outcome: Progressing  Intervention: Optimize Communication Skills  Recent Flowsheet Documentation  Taken  10/20/2023 0800 by Deana Lloyd, RN  Communication Enhancement Strategies:   call light answered in person   family involved in communication plan

## 2023-10-20 NOTE — PROGRESS NOTES
[facility]    Medicine Progress Note - Hospitalist Service, GOLD TEAM 10    Date of Admission:  10/6/2023    Assessment & Plan   Olga Bailey is a 78 year old female admitted on 10/6/2023. She has history of L frontoparietal glioblastoma s/p resection,chemotherapy and radiation with remission in 2021, recently found to have recurrence in September 2023, with baseline cognitive and functional impairment, h/o seizures, h/o DVT on xarelto, HTN, and depression who presented 10/6/23 with acute worsening of baseline aphasia as well as new facial twitching. Admitted to internal medicine for further work up and management. Neurology consulted and patient found to be having partial seizures for which lacosamide was added to keppra with improvement in seizure activity. Continued radiotherapy while on [facility] but has now transferred to [place] for concomitant bevacizumab. Tolerated bevacizumab well however developed worsening headaches and tiredness at reduced dose of steroids so increased back up to 4 mg BID per rad onc. Plan to continue radiation through next Wednesday and then hopefully discharge to TCU to gain some more strength as she was fairly mobile prior to her acute worsening.    Partial Seizure, previously controlled  Worsened chronic aphasia   Difficulty Swallowing  Presented with acute (< 1 day) worsening of aphasia and new facial twitching with abnormal mouth movements concerning for partial seizure.  She underwent stroke eval in the ED which was reassuring against acute CVA,   Given recent recurrence of glioblastoma, there was concern for spread or advance of disease, though both CT and MRI were reassuring to stability of current malignancy from last imaging (9/2023).  No metabolic derangements to explain acute change, and norm WBC with no focal findings was reassuring against acute infection.  Neurology consulted and felt movements were consistent with  partial seizure, lacosamide was added, though subsequent EEG showed some degree of persistent seizure activity, and further brief clinical seizures were observed.  Neurologist discussed risk/benefit of a possible third antiepileptic medication (which could carry a risk of heavier sedation), the family elected to remain on the two current medications for quality of life reasons.  - Neurology consulted, appreciate thoughtful input  - Continue levetiracetam 1250 mg BID   - Lacosamide was started with 200mg IV loading dose (complete) then 100mg IV BID   - transitioned to PO on 10/16   - Could increase to 150mg BID if increased seizure burden but stable on current dosing   - May reconsider third agent should seizure burden change drastically (ie Grand Mal, etc)   - Lorazepam PRN for any seizure activity > 2 minutes or increased clusters of seizure like activity   - Seizure precautions   - Neuro checks     H/o L frontoparietal glioblastoma s/p resection, chemotherapy and radiation (completed May 2021), with recurrence of malignancy 2023   Cognitive and functional impairment due to glioblastoma and chemoradiation   Seen by oncology on this visit, repeat imaging stable from September. Started on trial of steroid, eval symptom improvement. Patient has developed worsening difficulty with swallowing, now eating more. Reduced decadron to 2 mg IV BID from 4 mg BID on 10/17 however patient developed worsening headache on 10/19 so dose increased.  - Oncology consulted, appreciate input  - Dexamethasone 4mg iv BID  (10/9 -  )  - S/p bevacizumab 10/18   - TMP-SMX daily for PCP PPX (pt had hx of PJP)   - Rad/Onc consulted, RT started on 10/12 for 10 fractions    - No transportation benefit if this were needed from TCU  - PT/OT consulted, appreciate input     Likely aspiration pneumonitis  Coughing with medications, significant water suctioned.  Felt a little short of breath afterwords, which she felt improved with a duoneb.  On exam,  "she does have scattered bilateral wheezes present.   - DuoNeb QID PRN   - SLP consult completed, appreciate input. Soft diet level 6 with thin liquids.    Depression   - Continue PTA Buspirone and Fluoxetine     H/o DVT   - Continue PTA rivaroxaban     HTN   - Continue PTA Amlodipine     Hemorrhoids   -topical preparation H  -monitor           Diet: Snacks/Supplements Adult: Other; Send [employer] Magic Cup with dinner tray and allow pt/RN to order prn also (Also likes orange if in stock. NO van or ora); With Meals  Regular Diet Adult Thin Liquids (level 0)    DVT Prophylaxis: DOAC  Martino Catheter: Not present  Lines: None     Cardiac Monitoring: None  Code Status: Full Code      Clinically Significant Risk Factors                  # Hypertension: Noted on problem list        # Overweight: Estimated body mass index is 25.28 kg/m  as calculated from the following:    Height as of this encounter: 1.702 m (5' 7\").    Weight as of this encounter: 73.2 kg (161 lb 6 oz).             Disposition Plan      Expected Discharge Date: 10/26/2023      Destination: assisted living  Discharge Comments: Back to LTC v TCU once done with radiation          Gary Jacobson DO  Hospitalist Service, GOLD TEAM 10  [facility]  Securely message with [employer] (more info)  Text page via [employer] Paging/Directory   See signed in provider for up to date coverage information  ______________________________________________________________________    Interval History     No acute events overnight. She is feeling better now that she is back on the 4 mg IV BID of decadron with no more headaches. Discussed plan for after radiation. Olga and her partner both want her to go to a TCU to try and improve on strength prior to going home which seems reasonable. Breathing stable, no chest pain, no fevers or chills.    Physical Exam   Vital Signs: Temp: 98.8  F (37.1  C) Temp src: Oral BP: 129/71 Pulse: 78   Resp: 16 " SpO2: 94 % O2 Device: None (Room air)    Weight: 161 lbs 6.03 oz - not recorded this date    General Appearance: Alert and oriented in no acute distress  Respiratory: clear to auscultation bilaterally   Cardiovascular: regular   GI: soft nontender   Skin: warm   Neuro: Coarse resting tremor BUE  Other: Moving four extremities spontaneously. Unable to ambulate.      Medical Decision Making       55 MINUTES SPENT BY ME on the date of service doing chart review, history, exam, documentation & further activities per the note.      Data

## 2023-10-20 NOTE — PLAN OF CARE
Problem: Plan of Care - These are the overarching goals to be used throughout the patient stay.    Goal: Plan of Care Review  Description: The Plan of Care Review/Shift note should be completed every shift.  The Outcome Evaluation is a brief statement about your assessment that the patient is improving, declining, or no change.  This information will be displayed automatically on your shift note.  Outcome: Progressing   Goal Outcome Evaluation:  Pt A&O. VSS on ra. Denies pain and nausea. Neuro check Q4hrs. Turn/ reposition Q2-3 hrs. Denies SOB. Med crushed with applesauce. Purewick in place. Continue on POC.

## 2023-10-20 NOTE — PROGRESS NOTES
10/19/23 5665   Appointment Info   Signing Clinician's Name / Credentials (SLP) Leah Johansen MA CCC-SLP   General Information   Onset of Illness/Injury or Date of Surgery 10/06/23   Referring Physician Tommy Odom MD   Patient/Family Therapy Goal Statement (SLP) None stated   Pertinent History of Current Problem Pt is a 78 year old female admitted on 10/6/2023. She has history of L frontoparietal glioblastoma s/p resection,chemotherapy and radiation with remission in 2021, recently found to have recurrence in September 2023, with baseline cognitive and functional impairment, h/o seizures, h/o DVT on xarelto, HTN, and depression who presented 10/6/23 with acute worsening of baseline aphasia as well as new facial twitching. Admitted to internal medicine for further work up and management. Neurology consulted and patient found to be having partial seizures for which lacosamide was added to keppra with improvement in seizure activity. Continued radiotherapy while on [facility] but has now transferred to [place] for concomitant bevacizumab. VFSS completed today d/t ongoing concerns with aspiration, specifically on liquid meds.   General Observations Alert, pleasant   Type of Evaluation   Type of Evaluation Swallow Evaluation   General Swallowing Observations   Current Diet/Method of Nutritional Intake (General Swallowing Observations, NIS) soft and bite-sized (dysphagia advanced) (level 6);thin liquids (level 0)   Respiratory Support nonrebreather mask   Past History of Dysphagia Pt familiar to this service, evaluated on  on 10/9/23. Before this, pt was seen extensively in 2021 and 2022. Last VFSS was completed in April 2022, after which a modified diet was recommended. Per most recent clinical evaluation, pt had advanced to regular diet/thin liquids prior to this admission, with assistance from caregivers for tray prep.   Swallowing Evaluation Videofluoroscopic swallow study (VFSS)   VFSS  Evaluation   Radiologist Resident   Views Taken left lateral   Physical Location of Procedure [facility] [address]   VFSS Textures Trialed thin liquids;mildly thick liquids;pureed;solid foods   VFSS Eval: Thin Liquid Texture Trial   Mode of Presentation, Thin Liquid cup;straw   Order of Presentation 1, 2, 3, 6, 9   Preparatory Phase WFL   Oral Phase, Thin Liquid WFL   Bolus Location When Swallow Triggered valleculae   Pharyngeal Phase, Thin Liquid   (reduced LVC)   Rosenbek's Penetration Aspiration Scale: Thin Liquid Trial Results 3 - contrast remains above the vocal cords, visible residue remains (penetration)   Diagnostic Statement Penetration but no aspiration - laryngeal vestibule residue spontaneously clears with subsequent swallows or reflexive throat clear   VFSS Eval: Mildly Thick Liquids   Mode of Presentation cup   Order of Presentation 4   Preparatory Phase WFL   Oral Phase WFL   Bolus Location When Swallow Triggered valleculae   Pharyngeal Phase   (reduced LVC)   Rosenbek's Penetration Aspiration Scale 3 - contrast remains above the vocal cords, visible residue remains (penetration)   Diagnostic Statement Pen, no asp   VFSS Evaluation: Puree Solid Texture Trial   Mode of Presentation, Puree spoon   Order of Presentation 5   Preparatory Phase WFL   Oral Phase, Puree WFL   Bolus Location When Swallow Triggered valleculae   Pharyngeal Phase, Puree WFL   Rosenbek's Penetration Aspiration Scale: Puree Food Trial Results 1 - no aspiration, contrast does not enter airway   Diagnostic Statement No pen or asp, mild oral residue which clears IND   VFSS Evaluation: Solid Food Texture Trial   Mode of Presentation, Solid self-fed   Order of Presentation 7, 8   Preparatory Phase WFL   Oral Phase, Solid WFL   Bolus Location When Swallow Triggered valleculae   Pharyngeal Phase, Solid WFL   Rosenbek's Penetration Aspiration Scale: Solid Food Trial Results 1 - no aspiration, contrast does not enter airway   Diagnostic  "Statement No pen or asp   Esophageal Phase of Swallow   Patient reports or presents with symptoms of esophageal dysphagia No   Swallowing Recommendations   Diet Consistency Recommendations regular diet;thin liquids (level 0)   Supervision Level for Intake close supervision needed  (d/t baseline cog deficits)   Mode of Delivery Recommendations bolus size, small;slow rate of intake   Monitoring/Assistance Required (Eating/Swallowing) stop eating activities when fatigue is present   Recommended Feeding/Eating Techniques (Swallow Eval) maintain upright sitting position for eating   Medication Administration Recommendations, Swallowing (SLP) as tolerated - given difficulty in the past with meds administered via syringe, would recommend all \"syringe\" meds be first put into medicine cup and pt be allowed to drink them   Clinical Impression   Criteria for Skilled Therapeutic Interventions Met (SLP Eval) No problems identified which require skilled intervention   SLP Diagnosis Functional oropharyngeal swallow mechanism   Risks & Benefits of therapy have been explained evaluation/treatment results reviewed;care plan/treatment goals reviewed;risks/benefits reviewed;current/potential barriers reviewed;participants included;patient   Clinical Impression Comments   Videoswallow study completed per MD order to objectively assess oropharyngeal swallow mechanism. Under fluoroscopy, pt presents with a functional oropharyngeal swallow mechanism. Pt assessed with thin liquids, mildly-thick liquids, pudding, and cracker. Oral phase functional. Swallow trigger WFL. Once triggered, velar elevation, BOT retraction, and pharyngeal stripping wave are adequate. Epiglottic inversion complete. Laryngeal vestibule closure is reduced resulting in penetration of thin and thick liquids during the swallow. Pt noted to have trace amounts of laryngeal vestibule residue with liquids after the swallow which spontaneously clears with a second swallow or " "reflexive throat clear. No aspiration on today's study. Recommend the pt advance to regular diet/thin liquids with assistance from caregivers to ensure she is following safe swallowing strategies (upright positioning, slow rate, fully alert). Recommend pt take meds as tolerated; given hx of difficulty taking meds via syringe, recommend \"syringe\" meds be first put into medicine cup and then pt can drink them with small controlled sips. No additional SLP services indicated on IP basis.     SLP Total Evaluation Time   Evaluation, videofluoroscopic eval of swallow function Minutes (89817) 15   SLP Discharge Recommendation return to prior living situation   SLP Rationale for DC Rec Swallow functional   Total Session Time   Total Session Time (sum of timed and untimed services) 20     "

## 2023-10-21 ENCOUNTER — APPOINTMENT (OUTPATIENT)
Dept: PHYSICAL THERAPY | Facility: CLINIC | Age: 78
DRG: 054 | End: 2023-10-21
Attending: INTERNAL MEDICINE
Payer: MEDICARE

## 2023-10-21 LAB
ANION GAP SERPL CALCULATED.3IONS-SCNC: 10 MMOL/L (ref 7–15)
BUN SERPL-MCNC: 21.2 MG/DL (ref 8–23)
CALCIUM SERPL-MCNC: 9.3 MG/DL (ref 8.8–10.2)
CHLORIDE SERPL-SCNC: 102 MMOL/L (ref 98–107)
CREAT SERPL-MCNC: 0.86 MG/DL (ref 0.51–0.95)
DEPRECATED HCO3 PLAS-SCNC: 26 MMOL/L (ref 22–29)
EGFRCR SERPLBLD CKD-EPI 2021: 69 ML/MIN/1.73M2
ERYTHROCYTE [DISTWIDTH] IN BLOOD BY AUTOMATED COUNT: 13 % (ref 10–15)
GLUCOSE SERPL-MCNC: 122 MG/DL (ref 70–99)
HCT VFR BLD AUTO: 31.4 % (ref 35–47)
HGB BLD-MCNC: 10.1 G/DL (ref 11.7–15.7)
MAGNESIUM SERPL-MCNC: 2 MG/DL (ref 1.7–2.3)
MCH RBC QN AUTO: 30.6 PG (ref 26.5–33)
MCHC RBC AUTO-ENTMCNC: 32.2 G/DL (ref 31.5–36.5)
MCV RBC AUTO: 95 FL (ref 78–100)
PHOSPHATE SERPL-MCNC: 3.7 MG/DL (ref 2.5–4.5)
PLATELET # BLD AUTO: 171 10E3/UL (ref 150–450)
POTASSIUM SERPL-SCNC: 4.6 MMOL/L (ref 3.4–5.3)
RBC # BLD AUTO: 3.3 10E6/UL (ref 3.8–5.2)
SODIUM SERPL-SCNC: 138 MMOL/L (ref 135–145)
WBC # BLD AUTO: 9 10E3/UL (ref 4–11)

## 2023-10-21 PROCEDURE — 84100 ASSAY OF PHOSPHORUS: CPT | Performed by: STUDENT IN AN ORGANIZED HEALTH CARE EDUCATION/TRAINING PROGRAM

## 2023-10-21 PROCEDURE — 97112 NEUROMUSCULAR REEDUCATION: CPT | Mod: GP

## 2023-10-21 PROCEDURE — 83735 ASSAY OF MAGNESIUM: CPT | Performed by: STUDENT IN AN ORGANIZED HEALTH CARE EDUCATION/TRAINING PROGRAM

## 2023-10-21 PROCEDURE — 250N000013 HC RX MED GY IP 250 OP 250 PS 637: Performed by: INTERNAL MEDICINE

## 2023-10-21 PROCEDURE — 250N000013 HC RX MED GY IP 250 OP 250 PS 637: Performed by: STUDENT IN AN ORGANIZED HEALTH CARE EDUCATION/TRAINING PROGRAM

## 2023-10-21 PROCEDURE — 99232 SBSQ HOSP IP/OBS MODERATE 35: CPT | Performed by: STUDENT IN AN ORGANIZED HEALTH CARE EDUCATION/TRAINING PROGRAM

## 2023-10-21 PROCEDURE — 258N000003 HC RX IP 258 OP 636: Performed by: STUDENT IN AN ORGANIZED HEALTH CARE EDUCATION/TRAINING PROGRAM

## 2023-10-21 PROCEDURE — 94640 AIRWAY INHALATION TREATMENT: CPT | Mod: 76

## 2023-10-21 PROCEDURE — 36415 COLL VENOUS BLD VENIPUNCTURE: CPT | Performed by: STUDENT IN AN ORGANIZED HEALTH CARE EDUCATION/TRAINING PROGRAM

## 2023-10-21 PROCEDURE — 250N000009 HC RX 250: Performed by: STUDENT IN AN ORGANIZED HEALTH CARE EDUCATION/TRAINING PROGRAM

## 2023-10-21 PROCEDURE — 80048 BASIC METABOLIC PNL TOTAL CA: CPT | Performed by: STUDENT IN AN ORGANIZED HEALTH CARE EDUCATION/TRAINING PROGRAM

## 2023-10-21 PROCEDURE — 85027 COMPLETE CBC AUTOMATED: CPT | Performed by: STUDENT IN AN ORGANIZED HEALTH CARE EDUCATION/TRAINING PROGRAM

## 2023-10-21 PROCEDURE — 120N000005 HC R&B MS OVERFLOW UMMC

## 2023-10-21 PROCEDURE — 97530 THERAPEUTIC ACTIVITIES: CPT | Mod: GP

## 2023-10-21 PROCEDURE — 999N000157 HC STATISTIC RCP TIME EA 10 MIN

## 2023-10-21 PROCEDURE — 94640 AIRWAY INHALATION TREATMENT: CPT

## 2023-10-21 PROCEDURE — 250N000011 HC RX IP 250 OP 636: Mod: JZ | Performed by: STUDENT IN AN ORGANIZED HEALTH CARE EDUCATION/TRAINING PROGRAM

## 2023-10-21 RX ORDER — POLYETHYLENE GLYCOL 3350 17 G/17G
17 POWDER, FOR SOLUTION ORAL 2 TIMES DAILY
Status: DISCONTINUED | OUTPATIENT
Start: 2023-10-21 | End: 2023-10-22

## 2023-10-21 RX ADMIN — ALBUTEROL SULFATE 2.5 MG: 2.5 SOLUTION RESPIRATORY (INHALATION) at 14:40

## 2023-10-21 RX ADMIN — LEVETIRACETAM 1250 MG: 100 SOLUTION ORAL at 07:45

## 2023-10-21 RX ADMIN — SODIUM CHLORIDE, POTASSIUM CHLORIDE, SODIUM LACTATE AND CALCIUM CHLORIDE: 600; 310; 30; 20 INJECTION, SOLUTION INTRAVENOUS at 21:31

## 2023-10-21 RX ADMIN — ALBUTEROL SULFATE 2.5 MG: 2.5 SOLUTION RESPIRATORY (INHALATION) at 20:52

## 2023-10-21 RX ADMIN — LEVETIRACETAM 1250 MG: 100 SOLUTION ORAL at 20:00

## 2023-10-21 RX ADMIN — DEXAMETHASONE SODIUM PHOSPHATE 4 MG: 10 INJECTION, SOLUTION INTRAMUSCULAR; INTRAVENOUS at 08:06

## 2023-10-21 RX ADMIN — ALBUTEROL SULFATE 2.5 MG: 2.5 SOLUTION RESPIRATORY (INHALATION) at 09:14

## 2023-10-21 RX ADMIN — Medication 40 MG: at 07:45

## 2023-10-21 RX ADMIN — ALBUTEROL SULFATE 2.5 MG: 2.5 SOLUTION RESPIRATORY (INHALATION) at 02:15

## 2023-10-21 RX ADMIN — LACOSAMIDE 100 MG: 50 TABLET, FILM COATED ORAL at 07:45

## 2023-10-21 RX ADMIN — RIVAROXABAN 20 MG: 10 TABLET, FILM COATED ORAL at 17:38

## 2023-10-21 RX ADMIN — LACOSAMIDE 100 MG: 50 TABLET, FILM COATED ORAL at 20:00

## 2023-10-21 RX ADMIN — SODIUM CHLORIDE, POTASSIUM CHLORIDE, SODIUM LACTATE AND CALCIUM CHLORIDE: 600; 310; 30; 20 INJECTION, SOLUTION INTRAVENOUS at 09:49

## 2023-10-21 RX ADMIN — FLUOXETINE HYDROCHLORIDE 40 MG: 20 SOLUTION ORAL at 07:45

## 2023-10-21 RX ADMIN — SULFAMETHOXAZOLE AND TRIMETHOPRIM 1 TABLET: 800; 160 TABLET ORAL at 07:45

## 2023-10-21 RX ADMIN — BUSPIRONE HYDROCHLORIDE 30 MG: 30 TABLET ORAL at 07:45

## 2023-10-21 RX ADMIN — AMLODIPINE BESYLATE 5 MG: 5 TABLET ORAL at 07:49

## 2023-10-21 RX ADMIN — SENNOSIDES AND DOCUSATE SODIUM 1 TABLET: 50; 8.6 TABLET ORAL at 07:45

## 2023-10-21 RX ADMIN — POLYETHYLENE GLYCOL 3350 17 G: 17 POWDER, FOR SOLUTION ORAL at 20:00

## 2023-10-21 RX ADMIN — Medication 40 MG: at 17:38

## 2023-10-21 RX ADMIN — SENNOSIDES AND DOCUSATE SODIUM 1 TABLET: 50; 8.6 TABLET ORAL at 20:00

## 2023-10-21 RX ADMIN — BUSPIRONE HYDROCHLORIDE 30 MG: 30 TABLET ORAL at 19:59

## 2023-10-21 RX ADMIN — DEXAMETHASONE SODIUM PHOSPHATE 4 MG: 10 INJECTION, SOLUTION INTRAMUSCULAR; INTRAVENOUS at 20:00

## 2023-10-21 RX ADMIN — POLYETHYLENE GLYCOL 3350 17 G: 17 POWDER, FOR SOLUTION ORAL at 07:45

## 2023-10-21 RX ADMIN — THERA TABS 1 TABLET: TAB at 07:45

## 2023-10-21 ASSESSMENT — ACTIVITIES OF DAILY LIVING (ADL)
ADLS_ACUITY_SCORE: 63

## 2023-10-21 NOTE — PLAN OF CARE
Problem: Plan of Care - These are the overarching goals to be used throughout the patient stay.    Goal: Plan of Care Review  Description: The Plan of Care Review/Shift note should be completed every shift.  The Outcome Evaluation is a brief statement about your assessment that the patient is improving, declining, or no change.  This information will be displayed automatically on your shift note.  Outcome: Progressing   Goal Outcome Evaluation:  VSS on ra. Turn/reposition q2-3 hrs night shift. LR infusing @ 75 ml/h. Purewick in place, voiding adequately. Neuro check q4hrs. Resting between cares. Continue on POC.

## 2023-10-21 NOTE — PROGRESS NOTES
[facility]    Medicine Progress Note - Hospitalist Service, GOLD TEAM 10    Date of Admission:  10/6/2023    Assessment & Plan   Olga Bailey is a 78 year old female admitted on 10/6/2023. She has history of L frontoparietal glioblastoma s/p resection,chemotherapy and radiation with remission in 2021, recently found to have recurrence in September 2023, with baseline cognitive and functional impairment, h/o seizures, h/o DVT on xarelto, HTN, and depression who presented 10/6/23 with acute worsening of baseline aphasia as well as new facial twitching. Admitted to internal medicine for further work up and management. Neurology consulted and patient found to be having partial seizures for which lacosamide was added to keppra with improvement in seizure activity. Continued radiotherapy while on [facility] but has now transferred to [place] for concomitant bevacizumab. Tolerated bevacizumab well however developed worsening headaches and tiredness at reduced dose of steroids so increased back up to 4 mg BID per rad onc. Plan to continue radiation through next Wednesday and then hopefully discharge to TCU to gain some more strength as she was fairly mobile prior to her acute worsening.    Partial Seizure, previously controlled  Worsened chronic aphasia   Difficulty Swallowing  Presented with acute (< 1 day) worsening of aphasia and new facial twitching with abnormal mouth movements concerning for partial seizure.  She underwent stroke eval in the ED which was reassuring against acute CVA,   Given recent recurrence of glioblastoma, there was concern for spread or advance of disease, though both CT and MRI were reassuring to stability of current malignancy from last imaging (9/2023).  No metabolic derangements to explain acute change, and norm WBC with no focal findings was reassuring against acute infection.  Neurology consulted and felt movements were consistent with  partial seizure, lacosamide was added, though subsequent EEG showed some degree of persistent seizure activity, and further brief clinical seizures were observed.  Neurologist discussed risk/benefit of a possible third antiepileptic medication (which could carry a risk of heavier sedation), the family elected to remain on the two current medications for quality of life reasons.  - Neurology consulted, appreciate thoughtful input  - Continue levetiracetam 1250 mg BID   - Lacosamide was started with 200mg IV loading dose (complete) then 100mg IV BID   - transitioned to PO on 10/16   - Could increase to 150mg BID if increased seizure burden but stable on current dosing   - May reconsider third agent should seizure burden change drastically (ie Grand Mal, etc)   - Lorazepam PRN for any seizure activity > 2 minutes or increased clusters of seizure like activity   - Seizure precautions   - Neuro checks     H/o L frontoparietal glioblastoma s/p resection, chemotherapy and radiation (completed May 2021), with recurrence of malignancy 2023   Cognitive and functional impairment due to glioblastoma and chemoradiation   Seen by oncology on this visit, repeat imaging stable from September. Started on trial of steroid, eval symptom improvement. Patient has developed worsening difficulty with swallowing, now eating more. Reduced decadron to 2 mg IV BID from 4 mg BID on 10/17 however patient developed worsening headache on 10/19 so dose increased.  - Oncology consulted, appreciate input  - Dexamethasone 4mg iv BID  (10/9 -  )  - S/p bevacizumab 10/18   - TMP-SMX daily for PCP PPX (pt had hx of PJP)   - Rad/Onc consulted, RT started on 10/12 for 10 fractions    - No transportation benefit if this were needed from TCU plan for radiation in hospital through 10/25  - PT/OT consulted, appreciate input     Likely aspiration pneumonitis  Coughing with medications, significant water suctioned.  Felt a little short of breath afterwords,  "which she felt improved with a duoneb.  On exam, she does have scattered bilateral wheezes present.   - DuoNeb QID PRN   - SLP consult completed, appreciate input. Soft diet level 6 with thin liquids.    Depression   - Continue PTA Buspirone and Fluoxetine     H/o DVT   - Continue PTA rivaroxaban     HTN   - Continue PTA Amlodipine     Hemorrhoids   -topical preparation H  -monitor           Diet: Snacks/Supplements Adult: Other; Send Berry Magic Cup with dinner tray and allow pt/RN to order prn also (Also likes orange if in stock. NO van or ora); With Meals  Regular Diet Adult Thin Liquids (level 0)    DVT Prophylaxis: DOAC  Martino Catheter: Not present  Lines: None     Cardiac Monitoring: None  Code Status: Full Code      Clinically Significant Risk Factors                  # Hypertension: Noted on problem list        # Overweight: Estimated body mass index is 25.28 kg/m  as calculated from the following:    Height as of this encounter: 1.702 m (5' 7\").    Weight as of this encounter: 73.2 kg (161 lb 6 oz).             Disposition Plan      Expected Discharge Date: 10/26/2023      Destination: assisted living  Discharge Comments: Done w/ radiation on 10/25 then patient would prefer TCU to try and get stronger          Gary Jacobson DO  Hospitalist Service, GOLD TEAM 10  [facility]  Securely message with [employer] (more info)  Text page via [employer] Paging/Directory   See signed in provider for up to date coverage information  ______________________________________________________________________    Interval History     Nurse noted seemed a little confused overnight. When I saw her today she seemed at baseline. Partner was not in room today however bindu endorsed doing well. Does not have a headache, no N/V, no fevers or chills, no chest pain.     Physical Exam   Vital Signs: Temp: 97.7  F (36.5  C) Temp src: Axillary BP: 111/67 Pulse: 66   Resp: 16 SpO2: 98 % O2 Device: " None (Room air)    Weight: 161 lbs 6.03 oz - not recorded this date    General Appearance: Alert and oriented in no acute distress  Respiratory: clear to auscultation bilaterally   Cardiovascular: regular   GI: soft nontender   Skin: warm   Neuro: Coarse resting tremor BUE  Other: Moving four extremities spontaneously. Unable to ambulate.      Medical Decision Making       35 MINUTES SPENT BY ME on the date of service doing chart review, history, exam, documentation & further activities per the note.      Data

## 2023-10-22 ENCOUNTER — APPOINTMENT (OUTPATIENT)
Dept: PHYSICAL THERAPY | Facility: CLINIC | Age: 78
DRG: 054 | End: 2023-10-22
Attending: INTERNAL MEDICINE
Payer: MEDICARE

## 2023-10-22 LAB
HOLD SPECIMEN: NORMAL
MAGNESIUM SERPL-MCNC: 1.9 MG/DL (ref 1.7–2.3)
PHOSPHATE SERPL-MCNC: 3.5 MG/DL (ref 2.5–4.5)

## 2023-10-22 PROCEDURE — 94640 AIRWAY INHALATION TREATMENT: CPT

## 2023-10-22 PROCEDURE — 94640 AIRWAY INHALATION TREATMENT: CPT | Mod: 76

## 2023-10-22 PROCEDURE — 250N000013 HC RX MED GY IP 250 OP 250 PS 637: Performed by: STUDENT IN AN ORGANIZED HEALTH CARE EDUCATION/TRAINING PROGRAM

## 2023-10-22 PROCEDURE — 258N000003 HC RX IP 258 OP 636: Performed by: STUDENT IN AN ORGANIZED HEALTH CARE EDUCATION/TRAINING PROGRAM

## 2023-10-22 PROCEDURE — 36415 COLL VENOUS BLD VENIPUNCTURE: CPT | Performed by: STUDENT IN AN ORGANIZED HEALTH CARE EDUCATION/TRAINING PROGRAM

## 2023-10-22 PROCEDURE — 83735 ASSAY OF MAGNESIUM: CPT | Performed by: STUDENT IN AN ORGANIZED HEALTH CARE EDUCATION/TRAINING PROGRAM

## 2023-10-22 PROCEDURE — 999N000157 HC STATISTIC RCP TIME EA 10 MIN

## 2023-10-22 PROCEDURE — 250N000009 HC RX 250: Performed by: STUDENT IN AN ORGANIZED HEALTH CARE EDUCATION/TRAINING PROGRAM

## 2023-10-22 PROCEDURE — 250N000011 HC RX IP 250 OP 636: Mod: JZ | Performed by: STUDENT IN AN ORGANIZED HEALTH CARE EDUCATION/TRAINING PROGRAM

## 2023-10-22 PROCEDURE — 84100 ASSAY OF PHOSPHORUS: CPT | Performed by: STUDENT IN AN ORGANIZED HEALTH CARE EDUCATION/TRAINING PROGRAM

## 2023-10-22 PROCEDURE — 99232 SBSQ HOSP IP/OBS MODERATE 35: CPT | Performed by: STUDENT IN AN ORGANIZED HEALTH CARE EDUCATION/TRAINING PROGRAM

## 2023-10-22 PROCEDURE — 250N000013 HC RX MED GY IP 250 OP 250 PS 637: Performed by: INTERNAL MEDICINE

## 2023-10-22 PROCEDURE — 97530 THERAPEUTIC ACTIVITIES: CPT | Mod: GP

## 2023-10-22 PROCEDURE — 120N000005 HC R&B MS OVERFLOW UMMC

## 2023-10-22 PROCEDURE — 97112 NEUROMUSCULAR REEDUCATION: CPT | Mod: GP

## 2023-10-22 RX ORDER — POLYETHYLENE GLYCOL 3350 17 G/17G
17 POWDER, FOR SOLUTION ORAL 3 TIMES DAILY
Status: DISCONTINUED | OUTPATIENT
Start: 2023-10-22 | End: 2023-10-30

## 2023-10-22 RX ADMIN — SENNOSIDES AND DOCUSATE SODIUM 1 TABLET: 50; 8.6 TABLET ORAL at 19:29

## 2023-10-22 RX ADMIN — POLYETHYLENE GLYCOL 3350 17 G: 17 POWDER, FOR SOLUTION ORAL at 09:03

## 2023-10-22 RX ADMIN — ALBUTEROL SULFATE 2.5 MG: 2.5 SOLUTION RESPIRATORY (INHALATION) at 13:49

## 2023-10-22 RX ADMIN — BUSPIRONE HYDROCHLORIDE 30 MG: 30 TABLET ORAL at 19:29

## 2023-10-22 RX ADMIN — SENNOSIDES AND DOCUSATE SODIUM 1 TABLET: 50; 8.6 TABLET ORAL at 08:53

## 2023-10-22 RX ADMIN — BUSPIRONE HYDROCHLORIDE 30 MG: 30 TABLET ORAL at 09:03

## 2023-10-22 RX ADMIN — DEXAMETHASONE SODIUM PHOSPHATE 4 MG: 10 INJECTION, SOLUTION INTRAMUSCULAR; INTRAVENOUS at 19:29

## 2023-10-22 RX ADMIN — LACOSAMIDE 100 MG: 50 TABLET, FILM COATED ORAL at 19:29

## 2023-10-22 RX ADMIN — Medication 40 MG: at 17:07

## 2023-10-22 RX ADMIN — GLYCERIN, PETROLATUM, PHENYLEPHRINE HCL, PRAMOXINE HCL: 144; 2.5; 10; 15 CREAM TOPICAL at 22:58

## 2023-10-22 RX ADMIN — SULFAMETHOXAZOLE AND TRIMETHOPRIM 1 TABLET: 800; 160 TABLET ORAL at 08:53

## 2023-10-22 RX ADMIN — DEXAMETHASONE SODIUM PHOSPHATE 4 MG: 10 INJECTION, SOLUTION INTRAMUSCULAR; INTRAVENOUS at 09:13

## 2023-10-22 RX ADMIN — FLUOXETINE HYDROCHLORIDE 40 MG: 20 SOLUTION ORAL at 08:53

## 2023-10-22 RX ADMIN — ALBUTEROL SULFATE 2.5 MG: 2.5 SOLUTION RESPIRATORY (INHALATION) at 07:34

## 2023-10-22 RX ADMIN — THERA TABS 1 TABLET: TAB at 08:53

## 2023-10-22 RX ADMIN — AMLODIPINE BESYLATE 5 MG: 5 TABLET ORAL at 08:53

## 2023-10-22 RX ADMIN — ACETAMINOPHEN 1000 MG: 500 TABLET ORAL at 20:57

## 2023-10-22 RX ADMIN — POLYETHYLENE GLYCOL 3350 17 G: 17 POWDER, FOR SOLUTION ORAL at 19:29

## 2023-10-22 RX ADMIN — RIVAROXABAN 20 MG: 10 TABLET, FILM COATED ORAL at 17:07

## 2023-10-22 RX ADMIN — LEVETIRACETAM 1250 MG: 100 SOLUTION ORAL at 08:53

## 2023-10-22 RX ADMIN — LACOSAMIDE 100 MG: 50 TABLET, FILM COATED ORAL at 08:53

## 2023-10-22 RX ADMIN — ALBUTEROL SULFATE 2.5 MG: 2.5 SOLUTION RESPIRATORY (INHALATION) at 02:38

## 2023-10-22 RX ADMIN — Medication 40 MG: at 08:53

## 2023-10-22 RX ADMIN — LEVETIRACETAM 1250 MG: 100 SOLUTION ORAL at 19:29

## 2023-10-22 RX ADMIN — POLYETHYLENE GLYCOL 3350 17 G: 17 POWDER, FOR SOLUTION ORAL at 14:50

## 2023-10-22 RX ADMIN — SODIUM CHLORIDE, POTASSIUM CHLORIDE, SODIUM LACTATE AND CALCIUM CHLORIDE: 600; 310; 30; 20 INJECTION, SOLUTION INTRAVENOUS at 09:28

## 2023-10-22 ASSESSMENT — ACTIVITIES OF DAILY LIVING (ADL)
ADLS_ACUITY_SCORE: 63
ADLS_ACUITY_SCORE: 61
ADLS_ACUITY_SCORE: 63

## 2023-10-22 NOTE — PLAN OF CARE
"/73 (BP Location: Right arm)   Pulse 76   Temp 97.5  F (36.4  C) (Axillary)   Resp 17   Ht 1.702 m (5' 7\")   Wt 73.2 kg (161 lb 6 oz)   SpO2 97%   BMI 25.28 kg/m      VSS on room air except for hypertension; does not meet paging parameters. Alert and oriented x3; disoriented to time. Patient denies shortness of breath, chest pain or nausea. External catheter in place; adequate UOP. BM x1; medium, hard and formed. Spouse at bedside and attentive to patient's needs. Continue with plan of care and notify provider with any changes.      Goal Outcome Evaluation:      Plan of Care Reviewed With: patient    Overall Patient Progress: improving      Problem: Plan of Care - These are the overarching goals to be used throughout the patient stay.    Goal: Plan of Care Review  Description: The Plan of Care Review/Shift note should be completed every shift.  The Outcome Evaluation is a brief statement about your assessment that the patient is improving, declining, or no change.  This information will be displayed automatically on your shift note.  Outcome: Progressing  Flowsheets (Taken 10/22/2023 1834)  Plan of Care Reviewed With: patient  Overall Patient Progress: improving     Problem: Seizure Disorder Comorbidity  Goal: Maintenance of Seizure Control  Outcome: Progressing  Intervention: Maintain Seizure-Symptom Control  Recent Flowsheet Documentation  Taken 10/22/2023 1530 by Deana Lloyd, RN  Seizure Precautions:   emergency equipment at bedside   side rails padded  Taken 10/22/2023 1230 by Deana Lloyd, RN  Seizure Precautions:   emergency equipment at bedside   side rails padded  Taken 10/22/2023 0830 by Deana Lloyd, RN  Seizure Precautions:   emergency equipment at bedside   side rails padded     Problem: Hypertension Comorbidity  Goal: Blood Pressure in Desired Range  Outcome: Progressing  Intervention: Maintain Blood Pressure Management  Recent Flowsheet Documentation  Taken 10/22/2023 1530 " by Deana Lloyd RN  Medication Review/Management: medications reviewed  Taken 10/22/2023 1230 by Deana Lloyd RN  Medication Review/Management: medications reviewed  Taken 10/22/2023 0830 by Deana Lloyd RN  Medication Review/Management: medications reviewed     Problem: Thought Process Alteration  Goal: Optimal Thought Clarity  Outcome: Progressing  Intervention: Minimize Safety Risk and Altered Thought  Recent Flowsheet Documentation  Taken 10/22/2023 0830 by Deana Lloyd RN  Sensory Stimulation Regulation:   auditory stimulation minimized   quiet environment promoted   visual stimulation minimized     Problem: Swallowing Impairment  Goal: Optimal Eating/Swallowing without Aspiration  Outcome: Progressing  Intervention: Optimize Eating and Swallowing  Recent Flowsheet Documentation  Taken 10/22/2023 1530 by Deana Lloyd RN  Aspiration Precautions:   liquids thickened   respiratory status monitored   upright posture maintained   awake/alert before oral intake  Taken 10/22/2023 1230 by Deana Lloyd RN  Aspiration Precautions:   liquids thickened   respiratory status monitored   upright posture maintained   awake/alert before oral intake  Taken 10/22/2023 0830 by Deana Lloyd RN  Aspiration Precautions:   liquids thickened   respiratory status monitored   upright posture maintained   awake/alert before oral intake     Problem: Communication Impairment  Goal: Effective Communication Skills  Outcome: Progressing  Intervention: Optimize Communication Skills  Recent Flowsheet Documentation  Taken 10/22/2023 0830 by Deana Lloyd RN  Communication Enhancement Strategies:   call light answered in person   family involved in communication plan

## 2023-10-22 NOTE — PROGRESS NOTES
[facility]    Medicine Progress Note - Hospitalist Service, GOLD TEAM 10    Date of Admission:  10/6/2023    Assessment & Plan   Olga Bailey is a 78 year old female admitted on 10/6/2023. She has history of L frontoparietal glioblastoma s/p resection,chemotherapy and radiation with remission in 2021, recently found to have recurrence in September 2023, with baseline cognitive and functional impairment, h/o seizures, h/o DVT on xarelto, HTN, and depression who presented 10/6/23 with acute worsening of baseline aphasia as well as new facial twitching. Admitted to internal medicine for further work up and management. Neurology consulted and patient found to be having partial seizures for which lacosamide was added to keppra with improvement in seizure activity. Continued radiotherapy while on [facility] but has now transferred to [facility] for concomitant bevacizumab. Tolerated bevacizumab well however developed worsening headaches and tiredness at reduced dose of steroids so increased back up to 4 mg BID per rad onc. Plan to continue radiation through next Wednesday and then hopefully discharge to TCU to gain some more strength as she was fairly mobile prior to her acute worsening.    Partial Seizure, previously controlled  Worsened chronic aphasia   Difficulty Swallowing  Presented with acute (< 1 day) worsening of aphasia and new facial twitching with abnormal mouth movements concerning for partial seizure.  She underwent stroke eval in the ED which was reassuring against acute CVA,   Given recent recurrence of glioblastoma, there was concern for spread or advance of disease, though both CT and MRI were reassuring to stability of current malignancy from last imaging (9/2023).  No metabolic derangements to explain acute change, and norm WBC with no focal findings was reassuring against acute infection.  Neurology consulted and felt movements were consistent with  partial seizure, lacosamide was added, though subsequent EEG showed some degree of persistent seizure activity, and further brief clinical seizures were observed.  Neurologist discussed risk/benefit of a possible third antiepileptic medication (which could carry a risk of heavier sedation), the family elected to remain on the two current medications for quality of life reasons.  - Neurology consulted, appreciate thoughtful input  - Continue levetiracetam 1250 mg BID   - Lacosamide was started with 200mg IV loading dose (complete) then 100mg IV BID   - transitioned to PO on 10/16   - Could increase to 150mg BID if increased seizure burden but stable on current dosing   - May reconsider third agent should seizure burden change drastically (ie Grand Mal, etc)   - Lorazepam PRN for any seizure activity > 2 minutes or increased clusters of seizure like activity   - Seizure precautions   - Neuro checks     H/o L frontoparietal glioblastoma s/p resection, chemotherapy and radiation (completed May 2021), with recurrence of malignancy 2023   Cognitive and functional impairment due to glioblastoma and chemoradiation   Seen by oncology on this visit, repeat imaging stable from September. Started on trial of steroid, eval symptom improvement. Patient has developed worsening difficulty with swallowing, now eating more. Reduced decadron to 2 mg IV BID from 4 mg BID on 10/17 however patient developed worsening headache on 10/19 so dose increased.  - Oncology consulted, appreciate input  - Dexamethasone 4mg iv BID  (10/9 -  )  - S/p bevacizumab 10/18   - TMP-SMX daily for PCP PPX (pt had hx of PJP)   - Rad/Onc consulted, RT started on 10/12 for 10 fractions    - No transportation benefit if this were needed from TCU plan for radiation in hospital through 10/25  - PT/OT consulted, appreciate input     Likely aspiration pneumonitis  Coughing with medications, significant water suctioned.  Felt a little short of breath afterwords,  "which she felt improved with a duoneb.  On exam, she does have scattered bilateral wheezes present.   - DuoNeb QID PRN   - SLP consult completed, appreciate input. Soft diet level 6 with thin liquids.    Depression   - Continue PTA Buspirone and Fluoxetine     H/o DVT   - Continue PTA rivaroxaban     HTN   - Continue PTA Amlodipine     Hemorrhoids   -topical preparation H  -monitor           Diet: Snacks/Supplements Adult: Other; Send Berry Magic Cup with dinner tray and allow pt/RN to order prn also (Also likes orange if in stock. NO van or ora); With Meals  Regular Diet Adult Thin Liquids (level 0)    DVT Prophylaxis: DOAC  Martino Catheter: Not present  Lines: None     Cardiac Monitoring: None  Code Status: Full Code      Clinically Significant Risk Factors                  # Hypertension: Noted on problem list        # Overweight: Estimated body mass index is 25.28 kg/m  as calculated from the following:    Height as of this encounter: 1.702 m (5' 7\").    Weight as of this encounter: 73.2 kg (161 lb 6 oz).             Disposition Plan      Expected Discharge Date: 10/26/2023      Destination: assisted living  Discharge Comments: Done w/ radiation on 10/25 then patient would prefer TCU to try and get stronger          Gary Jacobson DO  Hospitalist Service, GOLD TEAM 10  [facility]  Securely message with [employer] (more info)  Text page via [email or web address] Paging/Directory   See signed in provider for up to date coverage information  ______________________________________________________________________    Interval History     No acute events overnight. Doing well today. She is having fair intake so stopping IVF. Answered some questions that Fahad had regarding the Atrixa. Plan to resume radiation tomorrow through Wednesday.     Physical Exam   Vital Signs: Temp: 98.6  F (37  C) Temp src: Oral BP: 131/73 Pulse: 74   Resp: 17 SpO2: 93 % O2 Device: None (Room air)    Weight: 161 lbs " 6.03 oz - not recorded this date    General Appearance: Alert and oriented in no acute distress  Respiratory: clear to auscultation bilaterally   Cardiovascular: regular   GI: soft nontender   Skin: warm   Neuro: Coarse resting tremor BUE  Other: Moving four extremities spontaneously. Unable to ambulate.      Medical Decision Making       35 MINUTES SPENT BY ME on the date of service doing chart review, history, exam, documentation & further activities per the note.      Data

## 2023-10-22 NOTE — PLAN OF CARE
A&O x3, disoriented to time. VSS. Q4hr neuro check, slightly weaker on R side @ baseline. Adequate urine output via purewick. Repositioned q2hrs. Last BM 10/17, following bowel regimen.     Continue with plan of care and update team as needed.     Problem: Plan of Care - These are the overarching goals to be used throughout the patient stay.    Goal: Plan of Care Review  Description: The Plan of Care Review/Shift note should be completed every shift.  The Outcome Evaluation is a brief statement about your assessment that the patient is improving, declining, or no change.  This information will be displayed automatically on your shift note.  Outcome: Progressing     Problem: Seizure Disorder Comorbidity  Goal: Maintenance of Seizure Control  Outcome: Progressing     Problem: Hypertension Comorbidity  Goal: Blood Pressure in Desired Range  Outcome: Progressing  Intervention: Maintain Blood Pressure Management  Recent Flowsheet Documentation  Taken 10/21/2023 2000 by Jake Wagner RN  Medication Review/Management: medications reviewed     Problem: Swallowing Impairment  Goal: Optimal Eating/Swallowing without Aspiration  Outcome: Progressing   Goal Outcome Evaluation:

## 2023-10-23 ENCOUNTER — PATIENT OUTREACH (OUTPATIENT)
Dept: ONCOLOGY | Facility: CLINIC | Age: 78
End: 2023-10-23
Payer: MEDICARE

## 2023-10-23 ENCOUNTER — APPOINTMENT (OUTPATIENT)
Dept: PHYSICAL THERAPY | Facility: CLINIC | Age: 78
DRG: 054 | End: 2023-10-23
Attending: INTERNAL MEDICINE
Payer: MEDICARE

## 2023-10-23 ENCOUNTER — APPOINTMENT (OUTPATIENT)
Dept: RADIATION ONCOLOGY | Facility: CLINIC | Age: 78
End: 2023-10-23
Attending: STUDENT IN AN ORGANIZED HEALTH CARE EDUCATION/TRAINING PROGRAM
Payer: MEDICARE

## 2023-10-23 LAB
ANION GAP SERPL CALCULATED.3IONS-SCNC: 10 MMOL/L (ref 7–15)
BUN SERPL-MCNC: 23 MG/DL (ref 8–23)
CALCIUM SERPL-MCNC: 9 MG/DL (ref 8.8–10.2)
CHLORIDE SERPL-SCNC: 103 MMOL/L (ref 98–107)
CREAT SERPL-MCNC: 0.72 MG/DL (ref 0.51–0.95)
DEPRECATED HCO3 PLAS-SCNC: 23 MMOL/L (ref 22–29)
EGFRCR SERPLBLD CKD-EPI 2021: 85 ML/MIN/1.73M2
ERYTHROCYTE [DISTWIDTH] IN BLOOD BY AUTOMATED COUNT: 13.2 % (ref 10–15)
GLUCOSE SERPL-MCNC: 126 MG/DL (ref 70–99)
HCT VFR BLD AUTO: 29.8 % (ref 35–47)
HGB BLD-MCNC: 9.6 G/DL (ref 11.7–15.7)
MAGNESIUM SERPL-MCNC: 1.9 MG/DL (ref 1.7–2.3)
MCH RBC QN AUTO: 30 PG (ref 26.5–33)
MCHC RBC AUTO-ENTMCNC: 32.2 G/DL (ref 31.5–36.5)
MCV RBC AUTO: 93 FL (ref 78–100)
PHOSPHATE SERPL-MCNC: 3.3 MG/DL (ref 2.5–4.5)
PLATELET # BLD AUTO: 169 10E3/UL (ref 150–450)
POTASSIUM SERPL-SCNC: 4.4 MMOL/L (ref 3.4–5.3)
RBC # BLD AUTO: 3.2 10E6/UL (ref 3.8–5.2)
SODIUM SERPL-SCNC: 136 MMOL/L (ref 135–145)
WBC # BLD AUTO: 8 10E3/UL (ref 4–11)

## 2023-10-23 PROCEDURE — 250N000009 HC RX 250: Performed by: STUDENT IN AN ORGANIZED HEALTH CARE EDUCATION/TRAINING PROGRAM

## 2023-10-23 PROCEDURE — 120N000005 HC R&B MS OVERFLOW UMMC

## 2023-10-23 PROCEDURE — 99232 SBSQ HOSP IP/OBS MODERATE 35: CPT | Performed by: STUDENT IN AN ORGANIZED HEALTH CARE EDUCATION/TRAINING PROGRAM

## 2023-10-23 PROCEDURE — 250N000013 HC RX MED GY IP 250 OP 250 PS 637: Performed by: STUDENT IN AN ORGANIZED HEALTH CARE EDUCATION/TRAINING PROGRAM

## 2023-10-23 PROCEDURE — 999N000157 HC STATISTIC RCP TIME EA 10 MIN

## 2023-10-23 PROCEDURE — 250N000013 HC RX MED GY IP 250 OP 250 PS 637: Performed by: INTERNAL MEDICINE

## 2023-10-23 PROCEDURE — 36415 COLL VENOUS BLD VENIPUNCTURE: CPT | Performed by: STUDENT IN AN ORGANIZED HEALTH CARE EDUCATION/TRAINING PROGRAM

## 2023-10-23 PROCEDURE — 250N000011 HC RX IP 250 OP 636: Mod: JZ | Performed by: STUDENT IN AN ORGANIZED HEALTH CARE EDUCATION/TRAINING PROGRAM

## 2023-10-23 PROCEDURE — 80048 BASIC METABOLIC PNL TOTAL CA: CPT | Performed by: STUDENT IN AN ORGANIZED HEALTH CARE EDUCATION/TRAINING PROGRAM

## 2023-10-23 PROCEDURE — 94640 AIRWAY INHALATION TREATMENT: CPT | Mod: 76

## 2023-10-23 PROCEDURE — 97110 THERAPEUTIC EXERCISES: CPT | Mod: GP

## 2023-10-23 PROCEDURE — 94640 AIRWAY INHALATION TREATMENT: CPT

## 2023-10-23 PROCEDURE — 83735 ASSAY OF MAGNESIUM: CPT | Performed by: STUDENT IN AN ORGANIZED HEALTH CARE EDUCATION/TRAINING PROGRAM

## 2023-10-23 PROCEDURE — 84100 ASSAY OF PHOSPHORUS: CPT | Performed by: STUDENT IN AN ORGANIZED HEALTH CARE EDUCATION/TRAINING PROGRAM

## 2023-10-23 PROCEDURE — 85027 COMPLETE CBC AUTOMATED: CPT | Performed by: STUDENT IN AN ORGANIZED HEALTH CARE EDUCATION/TRAINING PROGRAM

## 2023-10-23 PROCEDURE — 77386 HC IMRT TREATMENT DELIVERY, COMPLEX: CPT | Performed by: STUDENT IN AN ORGANIZED HEALTH CARE EDUCATION/TRAINING PROGRAM

## 2023-10-23 PROCEDURE — 97530 THERAPEUTIC ACTIVITIES: CPT | Mod: GP

## 2023-10-23 RX ADMIN — LACOSAMIDE 100 MG: 50 TABLET, FILM COATED ORAL at 20:20

## 2023-10-23 RX ADMIN — FLUOXETINE HYDROCHLORIDE 40 MG: 20 SOLUTION ORAL at 10:04

## 2023-10-23 RX ADMIN — THERA TABS 1 TABLET: TAB at 10:06

## 2023-10-23 RX ADMIN — ALBUTEROL SULFATE 2.5 MG: 2.5 SOLUTION RESPIRATORY (INHALATION) at 09:59

## 2023-10-23 RX ADMIN — SULFAMETHOXAZOLE AND TRIMETHOPRIM 1 TABLET: 800; 160 TABLET ORAL at 10:05

## 2023-10-23 RX ADMIN — LEVETIRACETAM 1250 MG: 100 SOLUTION ORAL at 20:21

## 2023-10-23 RX ADMIN — Medication 40 MG: at 10:04

## 2023-10-23 RX ADMIN — LEVETIRACETAM 1250 MG: 100 SOLUTION ORAL at 10:04

## 2023-10-23 RX ADMIN — AMLODIPINE BESYLATE 5 MG: 5 TABLET ORAL at 10:05

## 2023-10-23 RX ADMIN — SENNOSIDES AND DOCUSATE SODIUM 1 TABLET: 50; 8.6 TABLET ORAL at 20:20

## 2023-10-23 RX ADMIN — RIVAROXABAN 20 MG: 10 TABLET, FILM COATED ORAL at 17:20

## 2023-10-23 RX ADMIN — ALBUTEROL SULFATE 2.5 MG: 2.5 SOLUTION RESPIRATORY (INHALATION) at 21:50

## 2023-10-23 RX ADMIN — ALBUTEROL SULFATE 2.5 MG: 2.5 SOLUTION RESPIRATORY (INHALATION) at 15:39

## 2023-10-23 RX ADMIN — SENNOSIDES AND DOCUSATE SODIUM 1 TABLET: 50; 8.6 TABLET ORAL at 10:04

## 2023-10-23 RX ADMIN — Medication 40 MG: at 17:20

## 2023-10-23 RX ADMIN — POLYETHYLENE GLYCOL 3350 17 G: 17 POWDER, FOR SOLUTION ORAL at 14:17

## 2023-10-23 RX ADMIN — POLYETHYLENE GLYCOL 3350 17 G: 17 POWDER, FOR SOLUTION ORAL at 20:20

## 2023-10-23 RX ADMIN — BUSPIRONE HYDROCHLORIDE 30 MG: 30 TABLET ORAL at 10:54

## 2023-10-23 RX ADMIN — BUSPIRONE HYDROCHLORIDE 30 MG: 30 TABLET ORAL at 20:20

## 2023-10-23 RX ADMIN — DEXAMETHASONE SODIUM PHOSPHATE 4 MG: 10 INJECTION, SOLUTION INTRAMUSCULAR; INTRAVENOUS at 10:06

## 2023-10-23 RX ADMIN — POLYETHYLENE GLYCOL 3350 17 G: 17 POWDER, FOR SOLUTION ORAL at 10:05

## 2023-10-23 RX ADMIN — LACOSAMIDE 100 MG: 50 TABLET, FILM COATED ORAL at 10:05

## 2023-10-23 RX ADMIN — DEXAMETHASONE SODIUM PHOSPHATE 4 MG: 10 INJECTION, SOLUTION INTRAMUSCULAR; INTRAVENOUS at 20:19

## 2023-10-23 RX ADMIN — ALBUTEROL SULFATE 2.5 MG: 2.5 SOLUTION RESPIRATORY (INHALATION) at 04:01

## 2023-10-23 ASSESSMENT — ACTIVITIES OF DAILY LIVING (ADL)
ADLS_ACUITY_SCORE: 59
ADLS_ACUITY_SCORE: 57
ADLS_ACUITY_SCORE: 59
ADLS_ACUITY_SCORE: 57

## 2023-10-23 NOTE — PLAN OF CARE
VSS, w/ exception to hypertension but below paging parameters. Pt c/o abd pain, prn tylenol given x1. Pt had lg hard BM following constipation since 10/17. PRN Preparation-H applied. Q4 hr neuros @baseline. Q2hr repositioning.    Continue with plan of care and notify providers with any changes.    Problem: Thought Process Alteration  Goal: Optimal Thought Clarity  Outcome: Progressing     Problem: Swallowing Impairment  Goal: Optimal Eating/Swallowing without Aspiration  Outcome: Progressing     Problem: Communication Impairment  Goal: Effective Communication Skills  Outcome: Progressing   Goal Outcome Evaluation:

## 2023-10-23 NOTE — PROGRESS NOTES
Care Management Follow Up    Length of Stay (days): 16    Expected Discharge Date: 10/26/2023     Concerns to be Addressed: discharge planning     Patient plan of care discussed at interdisciplinary rounds: No    Anticipated Discharge Disposition: Transitional Care, Long Term Care, Home Care, Assisted Living     Anticipated Discharge Services:    Anticipated Discharge DME:  (TBD)    Patient/family educated on Medicare website which has current facility and service quality ratings: yes  Education Provided on the Discharge Plan: Yes  Patient/Family in Agreement with the Plan: yes    Referrals Placed by CM/SW: Post Acute Facilities  Private pay costs discussed:  Not addressed by writer at this time    Additional Information:  Chart reviewed.     Writer met with pt and pt's spouse in pt's room. Writer introduced self, discussed role and went over writer's availability. Discussed TCU recommendations. Pt/spouse is in agreement to TCU, though spouse and pt both have reported pt had some bad previous stays at other TCUs, [facility] [place] and [facility]. Pt/spouse strongly requested to be able to go to  TCU again. Writer explained that a referral can be made, but other referrals will also need to be made too. Discussed potential discharge needs to include TCU. List of Medicare certified options reviewed with patient/family/caregiver. Offered list and patient's right to choose among available options. Explained any financial ties to [place]. Spouse reports he will review the facilities; SW will follow up with him with choices tomorrow.    Referrals have been made to the following facilities and their status is as follows:    More referrals needed.    The following facilities have either declined pt or lack bed availability:    [facility]  [address] [place] [address]  [id number]  P: [phone]  F: [phone]  - Writer preemptively faxed referral packet for SNF to review.  - Writer spoke with  TCU d/t  pt being more appropriate to return back to her assisted in the assessors opinion. Writer has ceased following this referral.    ________________    MACARIO Rosado, [facility]  ED/Observation   [facility]  Phone: [phone]  Pager: [phone]  Fax: [phone]    On-call pager, [phone], 4:00pm to midnight

## 2023-10-23 NOTE — PROGRESS NOTES
[facility]    Medicine Progress Note - Hospitalist Service, GOLD TEAM 10    Date of Admission:  10/6/2023    Assessment & Plan   Olga Bailey is a 78 year old female admitted on 10/6/2023. She has history of L frontoparietal glioblastoma s/p resection,chemotherapy and radiation with remission in 2021, recently found to have recurrence in September 2023, with baseline cognitive and functional impairment, h/o seizures, h/o DVT on xarelto, HTN, and depression who presented 10/6/23 with acute worsening of baseline aphasia as well as new facial twitching. Admitted to internal medicine for further work up and management. Neurology consulted and patient found to be having partial seizures for which lacosamide was added to keppra with improvement in seizure activity. Continued radiotherapy while on [facility] but has now transferred to [place] for concomitant bevacizumab. Tolerated bevacizumab well however developed worsening headaches and tiredness at reduced dose of steroids so increased back up to 4 mg BID per rad onc. Plan to continue radiation through next Wednesday and then hopefully discharge to TCU to gain some more strength as she was fairly mobile prior to her acute worsening.    Partial Seizure, previously controlled  Worsened chronic aphasia   Difficulty Swallowing  Presented with acute (< 1 day) worsening of aphasia and new facial twitching with abnormal mouth movements concerning for partial seizure.  She underwent stroke eval in the ED which was reassuring against acute CVA,   Given recent recurrence of glioblastoma, there was concern for spread or advance of disease, though both CT and MRI were reassuring to stability of current malignancy from last imaging (9/2023).  No metabolic derangements to explain acute change, and norm WBC with no focal findings was reassuring against acute infection.  Neurology consulted and felt movements were consistent with  partial seizure, lacosamide was added, though subsequent EEG showed some degree of persistent seizure activity, and further brief clinical seizures were observed.  Neurologist discussed risk/benefit of a possible third antiepileptic medication (which could carry a risk of heavier sedation), the family elected to remain on the two current medications for quality of life reasons.  - Neurology consulted, appreciate thoughtful input  - Continue levetiracetam 1250 mg BID   - Lacosamide was started with 200mg IV loading dose (complete) then 100mg IV BID   - transitioned to PO on 10/16   - Could increase to 150mg BID if increased seizure burden but stable on current dosing   - May reconsider third agent should seizure burden change drastically (ie Grand Mal, etc)   - Lorazepam PRN for any seizure activity > 2 minutes or increased clusters of seizure like activity   - Seizure precautions   - Neuro checks     H/o L frontoparietal glioblastoma s/p resection, chemotherapy and radiation (completed May 2021), with recurrence of malignancy 2023   Cognitive and functional impairment due to glioblastoma and chemoradiation   Seen by oncology on this visit, repeat imaging stable from September. Started on trial of steroid, eval symptom improvement. Patient has developed worsening difficulty with swallowing, now eating more. Reduced decadron to 2 mg IV BID from 4 mg BID on 10/17 however patient developed worsening headache on 10/19 so dose increased.  - Oncology consulted, appreciate input  - Dexamethasone 4mg iv BID  (10/9 -  )  - S/p bevacizumab 10/18   - TMP-SMX daily for PCP PPX (pt had hx of PJP)   - Rad/Onc consulted, RT started on 10/12 for 10 fractions    - No transportation benefit if this were needed from TCU plan for radiation in hospital through 10/25  - PT/OT consulted, appreciate input     Likely aspiration pneumonitis  Coughing with medications, significant water suctioned.  Felt a little short of breath afterwords,  "which she felt improved with a duoneb.  On exam, she does have scattered bilateral wheezes present.   - DuoNeb QID PRN   - SLP consult completed, appreciate input. Soft diet level 6 with thin liquids.    Depression   - Continue PTA Buspirone and Fluoxetine     H/o DVT   - Continue PTA rivaroxaban     HTN   - Continue PTA Amlodipine     Hemorrhoids   -topical preparation H  -monitor           Diet: Snacks/Supplements Adult: Other; Send Berry Magic Cup with dinner tray and allow pt/RN to order prn also (Also likes orange if in stock. NO van or ora); With Meals  Regular Diet Adult Thin Liquids (level 0)    DVT Prophylaxis: DOAC  Martino Catheter: Not present  Lines: None     Cardiac Monitoring: None  Code Status: Full Code      Clinically Significant Risk Factors                  # Hypertension: Noted on problem list        # Overweight: Estimated body mass index is 25.28 kg/m  as calculated from the following:    Height as of this encounter: 1.702 m (5' 7\").    Weight as of this encounter: 73.2 kg (161 lb 6 oz).             Disposition Plan      Expected Discharge Date: 10/26/2023      Destination: assisted living  Discharge Comments: Done w/ radiation on 10/25 then patient would prefer TCU to try and get stronger          Gary Jacobson DO  Hospitalist Service, GOLD TEAM 10  [facility]  Securely message with [employer] (more info)  Text page via [employer] Paging/Directory   See signed in provider for up to date coverage information  ______________________________________________________________________    Interval History     No acute events overnight. Went for radiation today, just two more days left until completion. Overall continues to do well. Fahad was wondering whether OT could stop by and give him some information on keeping Olga stimulated during the day to prevent further cognitive decline which I will look into. Otherwise plan is the same, to continue radiation " through Wed and then discharge to TCU. Their preference is the [facility].    Physical Exam   Vital Signs: Temp: 98.1  F (36.7  C) Temp src: Axillary BP: 139/76 Pulse: 77   Resp: 16 SpO2: 95 % O2 Device: None (Room air)    Weight: 161 lbs 6.03 oz - not recorded this date    General Appearance: Alert and oriented in no acute distress  Respiratory: clear to auscultation bilaterally   Cardiovascular: regular   GI: soft nontender   Skin: warm   Neuro: Coarse resting tremor BUE  Other: Moving four extremities spontaneously. Unable to ambulate.      Medical Decision Making       35 MINUTES SPENT BY ME on the date of service doing chart review, history, exam, documentation & further activities per the note.      Data

## 2023-10-24 ENCOUNTER — APPOINTMENT (OUTPATIENT)
Dept: PHYSICAL THERAPY | Facility: CLINIC | Age: 78
DRG: 054 | End: 2023-10-24
Attending: INTERNAL MEDICINE
Payer: MEDICARE

## 2023-10-24 ENCOUNTER — APPOINTMENT (OUTPATIENT)
Dept: RADIATION ONCOLOGY | Facility: CLINIC | Age: 78
End: 2023-10-24
Attending: STUDENT IN AN ORGANIZED HEALTH CARE EDUCATION/TRAINING PROGRAM
Payer: MEDICARE

## 2023-10-24 LAB
HOLD SPECIMEN: NORMAL
MAGNESIUM SERPL-MCNC: 2 MG/DL (ref 1.7–2.3)
PHOSPHATE SERPL-MCNC: 3.4 MG/DL (ref 2.5–4.5)

## 2023-10-24 PROCEDURE — 77386 HC IMRT TREATMENT DELIVERY, COMPLEX: CPT | Performed by: STUDENT IN AN ORGANIZED HEALTH CARE EDUCATION/TRAINING PROGRAM

## 2023-10-24 PROCEDURE — 36415 COLL VENOUS BLD VENIPUNCTURE: CPT | Performed by: STUDENT IN AN ORGANIZED HEALTH CARE EDUCATION/TRAINING PROGRAM

## 2023-10-24 PROCEDURE — 120N000005 HC R&B MS OVERFLOW UMMC

## 2023-10-24 PROCEDURE — 250N000013 HC RX MED GY IP 250 OP 250 PS 637: Performed by: INTERNAL MEDICINE

## 2023-10-24 PROCEDURE — 250N000013 HC RX MED GY IP 250 OP 250 PS 637: Performed by: STUDENT IN AN ORGANIZED HEALTH CARE EDUCATION/TRAINING PROGRAM

## 2023-10-24 PROCEDURE — 250N000011 HC RX IP 250 OP 636: Mod: JZ | Performed by: STUDENT IN AN ORGANIZED HEALTH CARE EDUCATION/TRAINING PROGRAM

## 2023-10-24 PROCEDURE — 97530 THERAPEUTIC ACTIVITIES: CPT | Mod: GP | Performed by: PHYSICAL THERAPIST

## 2023-10-24 PROCEDURE — 83735 ASSAY OF MAGNESIUM: CPT | Performed by: STUDENT IN AN ORGANIZED HEALTH CARE EDUCATION/TRAINING PROGRAM

## 2023-10-24 PROCEDURE — 99233 SBSQ HOSP IP/OBS HIGH 50: CPT | Performed by: STUDENT IN AN ORGANIZED HEALTH CARE EDUCATION/TRAINING PROGRAM

## 2023-10-24 PROCEDURE — 84100 ASSAY OF PHOSPHORUS: CPT | Performed by: STUDENT IN AN ORGANIZED HEALTH CARE EDUCATION/TRAINING PROGRAM

## 2023-10-24 RX ORDER — PANTOPRAZOLE SODIUM 40 MG/1
40 TABLET, DELAYED RELEASE ORAL
Status: DISCONTINUED | OUTPATIENT
Start: 2023-10-24 | End: 2023-11-28 | Stop reason: HOSPADM

## 2023-10-24 RX ORDER — ALBUTEROL SULFATE 0.83 MG/ML
2.5 SOLUTION RESPIRATORY (INHALATION) EVERY 6 HOURS PRN
Status: DISCONTINUED | OUTPATIENT
Start: 2023-10-24 | End: 2023-11-28 | Stop reason: HOSPADM

## 2023-10-24 RX ADMIN — Medication 40 MG: at 08:50

## 2023-10-24 RX ADMIN — BUSPIRONE HYDROCHLORIDE 30 MG: 30 TABLET ORAL at 20:54

## 2023-10-24 RX ADMIN — DEXAMETHASONE SODIUM PHOSPHATE 4 MG: 10 INJECTION, SOLUTION INTRAMUSCULAR; INTRAVENOUS at 08:50

## 2023-10-24 RX ADMIN — SENNOSIDES AND DOCUSATE SODIUM 1 TABLET: 50; 8.6 TABLET ORAL at 20:04

## 2023-10-24 RX ADMIN — FLUOXETINE HYDROCHLORIDE 40 MG: 20 SOLUTION ORAL at 08:50

## 2023-10-24 RX ADMIN — POLYETHYLENE GLYCOL 3350 17 G: 17 POWDER, FOR SOLUTION ORAL at 20:04

## 2023-10-24 RX ADMIN — LACOSAMIDE 100 MG: 50 TABLET, FILM COATED ORAL at 08:50

## 2023-10-24 RX ADMIN — LEVETIRACETAM 1250 MG: 750 TABLET, FILM COATED ORAL at 20:04

## 2023-10-24 RX ADMIN — BUSPIRONE HYDROCHLORIDE 30 MG: 30 TABLET ORAL at 08:50

## 2023-10-24 RX ADMIN — SENNOSIDES AND DOCUSATE SODIUM 1 TABLET: 50; 8.6 TABLET ORAL at 08:50

## 2023-10-24 RX ADMIN — LACOSAMIDE 100 MG: 50 TABLET, FILM COATED ORAL at 20:04

## 2023-10-24 RX ADMIN — LEVETIRACETAM 1250 MG: 100 SOLUTION ORAL at 08:50

## 2023-10-24 RX ADMIN — THERA TABS 1 TABLET: TAB at 08:50

## 2023-10-24 RX ADMIN — AMLODIPINE BESYLATE 5 MG: 5 TABLET ORAL at 08:50

## 2023-10-24 RX ADMIN — DEXAMETHASONE SODIUM PHOSPHATE 4 MG: 10 INJECTION, SOLUTION INTRAMUSCULAR; INTRAVENOUS at 20:04

## 2023-10-24 RX ADMIN — PANTOPRAZOLE SODIUM 40 MG: 40 TABLET, DELAYED RELEASE ORAL at 17:02

## 2023-10-24 RX ADMIN — SULFAMETHOXAZOLE AND TRIMETHOPRIM 1 TABLET: 800; 160 TABLET ORAL at 08:50

## 2023-10-24 RX ADMIN — RIVAROXABAN 20 MG: 10 TABLET, FILM COATED ORAL at 17:02

## 2023-10-24 RX ADMIN — POLYETHYLENE GLYCOL 3350 17 G: 17 POWDER, FOR SOLUTION ORAL at 14:21

## 2023-10-24 ASSESSMENT — ACTIVITIES OF DAILY LIVING (ADL)
ADLS_ACUITY_SCORE: 63

## 2023-10-24 NOTE — PROGRESS NOTES
CLINICAL NUTRITION SERVICES - REASSESSMENT NOTE     Nutrition Prescription    RECOMMENDATIONS FOR MDs/PROVIDERS TO ORDER:  None at this time     Malnutrition Status:    Unable to assess due to inability to complete NFPE      Recommendations already ordered by Registered Dietitian (RD):  Ordered weight     Future/Additional Recommendations:  Continue to monitor appetite, oral intake and use of supplements (magic cup)  Monitor need for additional nutrition intervention     EVALUATION OF THE PROGRESS TOWARD GOALS   Diet: Regular (10/20) + berry magic cup and PRN   --soft and bite sized (IDDSI 6) 10/9-10/19  Intake: 75% (pasta, green beans, soup, sherbet), 75% (oatmeal, muffin, omelet)    Health touch reviewed and Olga is ordering 3 meals per day and average intake over the past 5 days provided 2065 kcal and 83 g protein if 100% meals consumed.   If 75% of meals consumed, patient continues to meet >100% of energy needs and ~88% of protein needs     NEW FINDINGS   Radiation yesterday and this morning + one more day. Olga was sleeping after radiation and laying flat, covered in bed but  Greame in room and reported she has been eating well. She likes the food available and he has also been very happy with her care. He did not note any concerns she may have regarding food or nutrition.     Weight trends:   No new weight since 10/18 (73.2 kg., 116 lb 6 oz)   10/18/23 73.2 kg (161 lb 6 oz)   04/18/23 66.2 kg (146 lb)   01/17/23 64.6 kg (142 lb 8 oz)   06/03/22 63 kg (139 lb)   04/18/22 65.6 kg (144 lb 11.2 oz)   02/24/22 67.1 kg (148 lb)   02/14/22 65.8 kg (145 lb)   12/03/21 68.5 kg (151 lb)   10/12/21 68.2 kg (150 lb 6.4 oz)       Dosing Weight: 57.5 kg (adjusted based off weight 73.2 kg and IBW)     ASSESSED NUTRITION NEEDS  Estimated Energy Needs: 1440 - 1725+ kcals/day (25 - 30 kcals/kg)  Justification: Maintenance with potential for higher needs w/ Ca  Estimated Protein Needs: 70+ grams protein/day  (1.2+)  Justification: Increased needs with recurrence of glioblastoma  Estimated Fluid Needs: 3749-3596 mL/day (25 - 30 mL/kg)   Justification: Maintenance     GI: No nausea noted. Last bowel movement, 10/23.   Skin: Gerardo 14    Labs:   Na 136, K+ 4.4, Mg 2, Po4 3.4 (10/23 and 10/24)  Glucose 126 (H)    Medications:   Decadron  Prozac  Thera-Vit  Protonix  Miralax  Senokot    MALNUTRITION  % Intake: Decreased intake does not meet criteria  % Weight Loss: None noted/unable to assess   Subcutaneous Fat Loss: None observed- very limited to visual assessment of face only   Muscle Loss: None observed- very limited to visual assessment of face only  Fluid Accumulation/Edema: does not  meet criteria  Malnutrition Diagnosis: Unable to determine due to very limited NFPE     Previous Goals   Patient to consume % of nutritionally adequate meal trays TID, or the equivalent with supplements/snacks.   Evaluation: Met    Previous Nutrition Diagnosis  Predicted inadequate nutrient intake of kcal/protein related to menu fatigue and/or missed meals with trips to [facility] for Radiation as evidenced by 10 day LOS and no discharge expected for another week.      Evaluation: diagnosis changed     CURRENT NUTRITION DIAGNOSIS  Predicted inadequate protein intake related to increased metabolic demand 2/2 cancer and food choices as evidenced by if consuming 75% of meals, patient meeting 88% of protein needs     INTERVENTIONS  Implementation  Will continue to send magic cup supplement. Olga is eating well, dysphagia improved and advanced to regular textures.     Goals  Patient to consume % of nutritionally adequate meal trays TID, or the equivalent with supplements/snacks.    Monitoring/Evaluation  Progress toward goals will be monitored and evaluated per protocol.      Chaya Fontana RD, LD  5C/BMT pager: [phone]

## 2023-10-24 NOTE — PROGRESS NOTES
[facility]    Medicine Progress Note - Hospitalist Service, GOLD TEAM 10    Date of Admission:  10/6/2023    Assessment & Plan   Olga Bailey is a 78 year old female admitted on 10/6/2023. She has history of L frontoparietal glioblastoma s/p resection, chemotherapy and radiation with remission in 2021, recently found to have recurrence in September 2023, with baseline cognitive and functional impairment, h/o seizures, h/o DVT on xarelto, HTN, and depression who presented 10/6/23 with acute worsening of baseline aphasia as well as new facial twitching. Admitted to internal medicine for further work up and management. Neurology consulted and patient found to be having partial seizures for which lacosamide was added to keppra with improvement in seizure activity. Continued radiotherapy while on [facility] but has now transferred to [place] for concomitant bevacizumab. Tolerated bevacizumab well however developed worsening headaches and tiredness at reduced dose of steroids so increased back up to 4 mg BID per rad onc. Plan to continue radiation through next Wednesday and then hopefully discharge to TCU to gain some more strength as she was fairly mobile prior to her acute worsening.     Partial Seizure, previously controlled  Worsened chronic aphasia   Difficulty Swallowing  Presented with acute (< 1 day) worsening of aphasia and new facial twitching with abnormal mouth movements concerning for partial seizure.  She underwent stroke eval in the ED which was reassuring against acute CVA,   Given recent recurrence of glioblastoma, there was concern for spread or advance of disease, though both CT and MRI were reassuring to stability of current malignancy from last imaging (9/2023).  No metabolic derangements to explain acute change, and norm WBC with no focal findings was reassuring against acute infection.  Neurology consulted and felt movements were consistent with  partial seizure, lacosamide was added, though subsequent EEG showed some degree of persistent seizure activity, and further brief clinical seizures were observed.  Neurologist discussed risk/benefit of a possible third antiepileptic medication (which could carry a risk of heavier sedation), the family elected to remain on the two current medications for quality of life reasons.  - Neurology consulted, appreciate thoughtful input  - Continue levetiracetam 1250 mg BID   - Lacosamide was started with 200mg IV loading dose (complete) then 100mg IV BID              - transitioned to PO on 10/16              - Could increase to 150mg BID if increased seizure burden but stable on current dosing   - May reconsider third agent should seizure burden change drastically (ie Grand Mal, etc)   - Lorazepam PRN for any seizure activity > 2 minutes or increased clusters of seizure like activity   - Seizure precautions   - Neuro checks      H/o L frontoparietal glioblastoma s/p resection, chemotherapy and radiation (completed May 2021), with recurrence of malignancy 2023   Cognitive and functional impairment due to glioblastoma and chemoradiation   Seen by oncology on this visit, repeat imaging stable from September. Started on trial of steroid, eval symptom improvement. Patient has developed worsening difficulty with swallowing, now eating more. Reduced decadron to 2 mg IV BID from 4 mg BID on 10/17 however patient developed worsening headache on 10/19 so dose increased.  - Oncology consulted, appreciate input  - Dexamethasone 4mg iv BID  (10/9 -  ) - will need to check on taper recs at end of radiation, will contact 10/25  - S/p bevacizumab 10/18,  next due 11/1              - TMP-SMX daily for PCP PPX (pt had hx of PJP)   - Rad/Onc consulted, RT started on 10/12 for 10 fractions - finishing on 10/25              - No transportation benefit if this were needed from TCU plan for radiation in hospital through 10/25  - PT/OT  consulted, appreciate input  Rec TCU. [facility] declined. Admissions # given to  per SW note - I can also call and discuss with the admissions team. She is below her baseline and rec is TCU, not clear why [facility] declined her.      Likely aspiration pneumonitis  Coughing with medications, significant water suctioned.  Felt a little short of breath afterwords, which she felt improved with a duoneb.  On exam, she does have scattered bilateral wheezes present.   - DuoNeb QID PRN   - SLP consult completed, appreciate input. Soft diet level 6 with thin liquids.     Depression   - Continue PTA Buspirone and Fluoxetine      H/o DVT   - Continue PTA rivaroxaban      HTN   - Continue PTA Amlodipine      Hemorrhoids   -topical preparation H  -monitor              Diet: Snacks/Supplements Adult: Other; Send Berry Magic Cup with dinner tray and allow pt/RN to order prn also (Also likes orange if in stock. NO van or ora); With Meals  Regular Diet Adult Thin Liquids (level 0)    DVT Prophylaxis: DOAC  Martino Catheter: Not present  Lines: None     Cardiac Monitoring: None  Code Status: Full Code      Clinically Significant Risk Factors                  # Hypertension: Noted on problem list                   Disposition Plan      Expected Discharge Date: 10/26/2023      Destination: assisted living;nursing home  Discharge Comments: Done w/ radiation on 10/25 then patient would prefer TCU to try and get stronger            Drea Pickering MD (Sally)  Internal Medicine/Pediatrics  Hospitalist    Hospitalist Service, GOLD TEAM 10  [facility]  Securely message with [employer] (more info)  Text page via [facility] Paging/Directory   See signed in provider for up to date coverage information  ______________________________________________________________________    Interval History   No acute evnets. Tolerating radiation very well. No pain, no discomfort, eating well. Worried about TCU placement.  Hopeful to go to [facility].     Physical Exam   Vital Signs: Temp: 98.4  F (36.9  C) Temp src: Oral BP: 127/77 Pulse: 79   Resp: 16 SpO2: (!) 76 % O2 Device: None (Room air)    Weight: 156 lbs 11.95 oz    General: awake, alert, in no acute distress  HEENT: NCAT, sclera anicteric, no nasal discharge, MMM  CV: RRR  Resp: CTAB, no increased WOB  Abd: Soft, nontender, nondistended, +BS, no rebound or guarding  MSK: No peripheral edema, extremities warm and well perfused  Skin: warm, dry, no jaundice  Neuro: R sided weakness and contracture chronic      Medical Decision Making       50 MINUTES SPENT BY ME on the date of service doing chart review, history, exam, documentation & further activities per the note.      Data         Imaging results reviewed over the past 24 hrs:   No results found for this or any previous visit (from the past 24 hour(s)).

## 2023-10-24 NOTE — PROGRESS NOTES
Care Management Follow Up    Length of Stay (days): 17    Expected Discharge Date: 10/26/2023     Concerns to be Addressed: discharge planning     Patient plan of care discussed at interdisciplinary rounds: Yes    Anticipated Discharge Disposition:   TCU placement as recommended by OT and Physical Therapy     Anticipated Discharge Services:  TCU placement as recommended by OT and Physical Therapy  Anticipated Discharge DME:  Not applicable at this time     Patient/family educated on Medicare website which has current facility and service quality ratings: Yes  Education Provided on the Discharge Plan: Yes  Patient/Family in Agreement with the Plan: Yes    Referrals Placed by CM/SW: Post Acute Care Facilities  Private pay costs discussed:   SW informed pt's  that some SNF's may have private pay private room fee's (if pt is placed in a private room) and  states that this would be affordable    Additional Information:  SW is following pt for discharge planning.   It is anticipated that pt will complete her last radiation treatment on 10/25/2023 and will then be ready for discharge.  OT and PT are recommending a TCU stay.  [facility].   [facility]'s  feels strongly that [facility] should have accepted and he indicates that he would like more information in regards to why pt was declined.  CAMRON provided [facility]'s Admission's phone number and encouraged  pt's  to call Admissions to discuss this matter.   CAMRON met with pt,  (Fahad), and daughter (La Nena) to obtain [facility] facility preferences.  Per discussion, pt's family wants pt placed in a SNF that is well rated, clean, good rehab program, good staffing, TCU unit, etc.     Based on discussion, CAMRON made referrals (via Epic) to the following SNF's for  pt's TCU stay:  - [facility]  [facility]  - [facility].    CAMRON will  continue to follow for discharge planning      DAYNA Nuñez, covering for MACARIO Peres on 10/24/2023 only  Social Work, 6A  Phone:  [phone]  Pager:  [phone]  10/24/2023       LIBBY Cates

## 2023-10-24 NOTE — PROVIDER NOTIFICATION
Patient was not in any distress and reported not having any shortness of breath. Breath sounds clear and diminished. Her oxygen saturation level was 95%. Order modified to Q6 PRN nebulizers per RCAT protocol.     Hammad June, RT on 10/24/2023 at 2:26 AM       10/23/23 2150   RCAT Assessment   Pulmonary Status 0   Surgical Status 0   Chest X-ray 0   Respiratory Pattern 0   Mental Status 0   Breath Sounds 2   Cough Effectiveness 2   Level of Activity 4   O2 Required for SpO2>=92% 0   Acuity Level (points) 8   Acuity Level  4   Re-eval Interval Guideline Every 7 days if 2 consecutive evals unchanged   Re-evaluation Date 10/24/23   Aerosol Therapy (SVN)   Daily Review of Necessity (SVN) completed   Respiratory Treatment Status (SVN) given   Patient Position semi-Buck's   Posttreatment Assessment (SVN) breath sounds unchanged   Signs of Intolerance (SVN) none

## 2023-10-24 NOTE — PROGRESS NOTES
5C Shift note [id number]    Neuro: A&Ox3 (did not know the year).  Able to make needs known. R sided weakness. Q4H neuro checks, no changes noted.  Cardiac: VSS. Afebrile.   Respiratory: Sating >92% on RA.  GI/: Adequate urine output via purewick. Medium BM X2 this shift - incontinent of bowel and bladder.  Activity:  Assist of 2 with lift, Repositioned every 2 hours.  Pain: At acceptable level on current regimen.   Skin: No new deficits noted. Barrier cream applied to buttocks and sacrum with each turn.  LDA's: PIVx2 saline locked.    Plan: Patient will go to radiation tomorrow. Continue with POC. Notify primary team with changes.

## 2023-10-24 NOTE — PLAN OF CARE
5C Shift note 2206 - [id number]     Neuro: A&Ox3 (baseline).  Able to make needs known. R sided weakness. Q4H neuro checks, no changes noted.  Cardiac: VSS. Afebrile.            Respiratory: Sating >92% on RA.  GI/: Adequate urine output via purewick. Medium BM X2 and small x1,this shift - incontinent of bowel and bladder.  Activity:  Assist of 2 with lift, Repositioned every 2 hours.  Pain: Denies pain   Skin: No new deficits noted. Barrier cream applied to buttocks and sacrum with each turn.  LDA's: PIVx2 saline locked was reported      Plan: Patient will go to radiation today Continue with POC. Notify primary team with changes             Goal Outcome Evaluation:    Problem: Plan of Care - These are the overarching goals to be used throughout the patient stay.    Goal: Plan of Care Review  Description: The Plan of Care Review/Shift note should be completed every shift.  The Outcome Evaluation is a brief statement about your assessment that the patient is improving, declining, or no change.  This information will be displayed automatically on your shift note.  Outcome: Progressing     Problem: Seizure Disorder Comorbidity  Goal: Maintenance of Seizure Control  Outcome: Progressing  Intervention: Maintain Seizure-Symptom Control  Recent Flowsheet Documentation  Taken 10/24/2023 0247 by Stephanie Whitaker RN  Seizure Precautions:   emergency equipment at bedside   side rails padded  Taken 10/24/2023 0000 by Stephanie Whitaker RN  Seizure Precautions:   emergency equipment at bedside   side rails padded     Problem: Swallowing Impairment  Goal: Optimal Eating/Swallowing without Aspiration  Outcome: Progressing  Intervention: Optimize Eating and Swallowing  Recent Flowsheet Documentation  Taken 10/24/2023 0247 by Stephanie Whitaker RN  Aspiration Precautions: awake/alert before oral intake  Taken 10/24/2023 0000 by Stephanie Whitaker RN  Aspiration Precautions: awake/alert before oral  intake  Feeding/Eating Techniques: feeding assistance provided   Goal Outcome Evaluation:

## 2023-10-24 NOTE — PLAN OF CARE
Goal Outcome Evaluation:  Alert and oriented to self, place and situation.Pt gives short answers to questions.Follows command. No change in neuro status.Cooperative with nursing cares.B/P ran high this morning; pt is on amlodipine 5 mg tab po daily; B/P within parameters.    Pt took all her morning pills whole with apple sauce per  request and She was able to take her pills whole with apple sauce one at a time with no swallowing problem.  fed pt her breakfast, ate 50%. Incontinent of bowel and bladder; pure wick in place.Small soft BM x 3 and large BM x1.Complete sponge bath was given before pt went to radiation this morning.Radiation done at 10:30 am today.  Pt got tired right after radiation; worked with physical therapy this afternoon.  Seizure pads are on bilateral side rails.     at bed side; involved in pt's care. Supportive of pt.  Plan for Radiation tomorrow at 8:30 am  Continue with POC

## 2023-10-25 ENCOUNTER — APPOINTMENT (OUTPATIENT)
Dept: PHYSICAL THERAPY | Facility: CLINIC | Age: 78
DRG: 054 | End: 2023-10-25
Attending: INTERNAL MEDICINE
Payer: MEDICARE

## 2023-10-25 ENCOUNTER — DOCUMENTATION ONLY (OUTPATIENT)
Dept: RADIATION ONCOLOGY | Facility: CLINIC | Age: 78
End: 2023-10-25

## 2023-10-25 ENCOUNTER — ONCOLOGY VISIT (OUTPATIENT)
Dept: RADIATION ONCOLOGY | Facility: CLINIC | Age: 78
End: 2023-10-25

## 2023-10-25 ENCOUNTER — APPOINTMENT (OUTPATIENT)
Dept: RADIATION ONCOLOGY | Facility: CLINIC | Age: 78
End: 2023-10-25
Attending: STUDENT IN AN ORGANIZED HEALTH CARE EDUCATION/TRAINING PROGRAM
Payer: MEDICARE

## 2023-10-25 DIAGNOSIS — C71.9 GLIOBLASTOMA (H): Primary | ICD-10-CM

## 2023-10-25 LAB
ANION GAP SERPL CALCULATED.3IONS-SCNC: 7 MMOL/L (ref 7–15)
BUN SERPL-MCNC: 26.4 MG/DL (ref 8–23)
CALCIUM SERPL-MCNC: 9.2 MG/DL (ref 8.8–10.2)
CHLORIDE SERPL-SCNC: 103 MMOL/L (ref 98–107)
CREAT SERPL-MCNC: 0.82 MG/DL (ref 0.51–0.95)
DEPRECATED HCO3 PLAS-SCNC: 26 MMOL/L (ref 22–29)
EGFRCR SERPLBLD CKD-EPI 2021: 73 ML/MIN/1.73M2
ERYTHROCYTE [DISTWIDTH] IN BLOOD BY AUTOMATED COUNT: 13.1 % (ref 10–15)
GLUCOSE SERPL-MCNC: 107 MG/DL (ref 70–99)
HCT VFR BLD AUTO: 32.1 % (ref 35–47)
HGB BLD-MCNC: 10.2 G/DL (ref 11.7–15.7)
MAGNESIUM SERPL-MCNC: 2.1 MG/DL (ref 1.7–2.3)
MCH RBC QN AUTO: 29.7 PG (ref 26.5–33)
MCHC RBC AUTO-ENTMCNC: 31.8 G/DL (ref 31.5–36.5)
MCV RBC AUTO: 93 FL (ref 78–100)
PHOSPHATE SERPL-MCNC: 3.1 MG/DL (ref 2.5–4.5)
PLATELET # BLD AUTO: 164 10E3/UL (ref 150–450)
POTASSIUM SERPL-SCNC: 4.4 MMOL/L (ref 3.4–5.3)
RBC # BLD AUTO: 3.44 10E6/UL (ref 3.8–5.2)
SODIUM SERPL-SCNC: 136 MMOL/L (ref 135–145)
WBC # BLD AUTO: 8.7 10E3/UL (ref 4–11)

## 2023-10-25 PROCEDURE — 250N000013 HC RX MED GY IP 250 OP 250 PS 637: Performed by: STUDENT IN AN ORGANIZED HEALTH CARE EDUCATION/TRAINING PROGRAM

## 2023-10-25 PROCEDURE — 94640 AIRWAY INHALATION TREATMENT: CPT | Mod: 76

## 2023-10-25 PROCEDURE — 77336 RADIATION PHYSICS CONSULT: CPT | Performed by: STUDENT IN AN ORGANIZED HEALTH CARE EDUCATION/TRAINING PROGRAM

## 2023-10-25 PROCEDURE — 99233 SBSQ HOSP IP/OBS HIGH 50: CPT | Performed by: STUDENT IN AN ORGANIZED HEALTH CARE EDUCATION/TRAINING PROGRAM

## 2023-10-25 PROCEDURE — 80048 BASIC METABOLIC PNL TOTAL CA: CPT | Performed by: STUDENT IN AN ORGANIZED HEALTH CARE EDUCATION/TRAINING PROGRAM

## 2023-10-25 PROCEDURE — 83735 ASSAY OF MAGNESIUM: CPT | Performed by: STUDENT IN AN ORGANIZED HEALTH CARE EDUCATION/TRAINING PROGRAM

## 2023-10-25 PROCEDURE — 120N000005 HC R&B MS OVERFLOW UMMC

## 2023-10-25 PROCEDURE — 36415 COLL VENOUS BLD VENIPUNCTURE: CPT | Performed by: STUDENT IN AN ORGANIZED HEALTH CARE EDUCATION/TRAINING PROGRAM

## 2023-10-25 PROCEDURE — 250N000009 HC RX 250: Performed by: HOSPITALIST

## 2023-10-25 PROCEDURE — 250N000012 HC RX MED GY IP 250 OP 636 PS 637: Performed by: STUDENT IN AN ORGANIZED HEALTH CARE EDUCATION/TRAINING PROGRAM

## 2023-10-25 PROCEDURE — 77386 HC IMRT TREATMENT DELIVERY, COMPLEX: CPT | Performed by: STUDENT IN AN ORGANIZED HEALTH CARE EDUCATION/TRAINING PROGRAM

## 2023-10-25 PROCEDURE — 84100 ASSAY OF PHOSPHORUS: CPT | Performed by: STUDENT IN AN ORGANIZED HEALTH CARE EDUCATION/TRAINING PROGRAM

## 2023-10-25 PROCEDURE — 250N000013 HC RX MED GY IP 250 OP 250 PS 637: Performed by: INTERNAL MEDICINE

## 2023-10-25 PROCEDURE — 999N000157 HC STATISTIC RCP TIME EA 10 MIN

## 2023-10-25 PROCEDURE — 250N000011 HC RX IP 250 OP 636: Mod: JZ | Performed by: STUDENT IN AN ORGANIZED HEALTH CARE EDUCATION/TRAINING PROGRAM

## 2023-10-25 PROCEDURE — 85027 COMPLETE CBC AUTOMATED: CPT | Performed by: STUDENT IN AN ORGANIZED HEALTH CARE EDUCATION/TRAINING PROGRAM

## 2023-10-25 PROCEDURE — 97530 THERAPEUTIC ACTIVITIES: CPT | Mod: GP

## 2023-10-25 RX ORDER — DEXAMETHASONE SODIUM PHOSPHATE 10 MG/ML
2 INJECTION, SOLUTION INTRAMUSCULAR; INTRAVENOUS EVERY 12 HOURS
Status: DISCONTINUED | OUTPATIENT
Start: 2023-10-26 | End: 2023-10-25

## 2023-10-25 RX ORDER — DEXAMETHASONE 2 MG/1
2 TABLET ORAL EVERY 12 HOURS SCHEDULED
Status: DISCONTINUED | OUTPATIENT
Start: 2023-10-26 | End: 2023-11-10

## 2023-10-25 RX ORDER — DEXAMETHASONE 4 MG/1
4 TABLET ORAL ONCE
Status: COMPLETED | OUTPATIENT
Start: 2023-10-25 | End: 2023-10-25

## 2023-10-25 RX ADMIN — BUSPIRONE HYDROCHLORIDE 30 MG: 30 TABLET ORAL at 08:53

## 2023-10-25 RX ADMIN — SENNOSIDES AND DOCUSATE SODIUM 1 TABLET: 50; 8.6 TABLET ORAL at 19:56

## 2023-10-25 RX ADMIN — POLYETHYLENE GLYCOL 3350 17 G: 17 POWDER, FOR SOLUTION ORAL at 19:57

## 2023-10-25 RX ADMIN — PANTOPRAZOLE SODIUM 40 MG: 40 TABLET, DELAYED RELEASE ORAL at 16:02

## 2023-10-25 RX ADMIN — BUSPIRONE HYDROCHLORIDE 30 MG: 30 TABLET ORAL at 19:55

## 2023-10-25 RX ADMIN — FLUOXETINE 40 MG: 20 CAPSULE ORAL at 08:53

## 2023-10-25 RX ADMIN — DEXAMETHASONE 4 MG: 4 TABLET ORAL at 19:56

## 2023-10-25 RX ADMIN — RIVAROXABAN 20 MG: 10 TABLET, FILM COATED ORAL at 16:02

## 2023-10-25 RX ADMIN — LACOSAMIDE 100 MG: 50 TABLET, FILM COATED ORAL at 08:53

## 2023-10-25 RX ADMIN — THERA TABS 1 TABLET: TAB at 08:53

## 2023-10-25 RX ADMIN — SULFAMETHOXAZOLE AND TRIMETHOPRIM 1 TABLET: 800; 160 TABLET ORAL at 08:53

## 2023-10-25 RX ADMIN — ALBUTEROL SULFATE 2.5 MG: 2.5 SOLUTION RESPIRATORY (INHALATION) at 20:41

## 2023-10-25 RX ADMIN — POLYETHYLENE GLYCOL 3350 17 G: 17 POWDER, FOR SOLUTION ORAL at 08:53

## 2023-10-25 RX ADMIN — SENNOSIDES AND DOCUSATE SODIUM 1 TABLET: 50; 8.6 TABLET ORAL at 08:52

## 2023-10-25 RX ADMIN — LEVETIRACETAM 1250 MG: 750 TABLET, FILM COATED ORAL at 08:53

## 2023-10-25 RX ADMIN — LEVETIRACETAM 1250 MG: 750 TABLET, FILM COATED ORAL at 19:56

## 2023-10-25 RX ADMIN — LACOSAMIDE 100 MG: 50 TABLET, FILM COATED ORAL at 19:55

## 2023-10-25 RX ADMIN — PANTOPRAZOLE SODIUM 40 MG: 40 TABLET, DELAYED RELEASE ORAL at 08:52

## 2023-10-25 RX ADMIN — DEXAMETHASONE SODIUM PHOSPHATE 4 MG: 10 INJECTION, SOLUTION INTRAMUSCULAR; INTRAVENOUS at 08:54

## 2023-10-25 RX ADMIN — AMLODIPINE BESYLATE 5 MG: 5 TABLET ORAL at 08:53

## 2023-10-25 ASSESSMENT — ACTIVITIES OF DAILY LIVING (ADL)
ADLS_ACUITY_SCORE: 65
ADLS_ACUITY_SCORE: 63
ADLS_ACUITY_SCORE: 65
ADLS_ACUITY_SCORE: 63
ADLS_ACUITY_SCORE: 65
ADLS_ACUITY_SCORE: 63
ADLS_ACUITY_SCORE: 63
ADLS_ACUITY_SCORE: 65
ADLS_ACUITY_SCORE: 63
ADLS_ACUITY_SCORE: 63

## 2023-10-25 NOTE — Clinical Note
10/25/2023         RE: Olga Bailey  [address]  [place] [address]        Dear Colleague,    Thank you for referring your patient, Olga Bailey, to the [facility] RADIATION ONCOLOGY. Please see a copy of my visit note below.    No notes on file    Again, thank you for allowing me to participate in the care of your patient.        Sincerely,        Betsy Spann MD PhD

## 2023-10-25 NOTE — PROGRESS NOTES
Brief Radiation Oncology Note    Requested recommendation of steroid dose taper from inpatient Hospitalist team. Per Dr. Pickering, patient is not having new or worsening neurologic symptoms. Ok to begin taper as follows:    2 weeks of 2 mg BID, then 2 weeks of 2 mg daily, then off.     Discussed with hospitalist, Dr. Drea Pickering.     Romina Humphrey MD PGY5  Department of Radiation Oncology  [phone] Clinic  [phone] Pager

## 2023-10-25 NOTE — PROGRESS NOTES
[facility]    Medicine Progress Note - Hospitalist Service, GOLD TEAM 10    Date of Admission:  10/6/2023    Assessment & Plan   Olga Bailey is a 78 year old female admitted on 10/6/2023. She has history of L frontoparietal glioblastoma s/p resection, chemotherapy and radiation with remission in 2021, recently found to have recurrence in September 2023, with baseline cognitive and functional impairment, h/o seizures, h/o DVT on xarelto, HTN, and depression who presented 10/6/23 with acute worsening of baseline aphasia as well as new facial twitching. Admitted to internal medicine for further work up and management. Neurology consulted and patient found to be having partial seizures for which lacosamide was added to keppra with improvement in seizure activity. Continued radiotherapy while on [facility] but has now transferred to [place] for concomitant bevacizumab. Tolerated bevacizumab well however developed worsening headaches and tiredness at reduced dose of steroids so increased back up to 4 mg BID per rad onc. Completed 10 days of radiation on 10/25/23. Medically ready to discharge to hopefully TCU.     Partial Seizure, previously controlled  Worsened chronic aphasia   Difficulty Swallowing  Presented with acute (< 1 day) worsening of aphasia and new facial twitching with abnormal mouth movements concerning for partial seizure.  She underwent stroke eval in the ED which was reassuring against acute CVA,   Given recent recurrence of glioblastoma, there was concern for spread or advance of disease, though both CT and MRI were reassuring to stability of current malignancy from last imaging (9/2023).  No metabolic derangements to explain acute change, and norm WBC with no focal findings was reassuring against acute infection.  Neurology consulted and felt movements were consistent with partial seizure, lacosamide was added, though subsequent EEG showed some degree  of persistent seizure activity, and further brief clinical seizures were observed.  Neurologist discussed risk/benefit of a possible third antiepileptic medication (which could carry a risk of heavier sedation), the family elected to remain on the two current medications for quality of life reasons.  - Neurology consulted, appreciate thoughtful input  - Continue levetiracetam 1250 mg BID   - Lacosamide was started with 200mg IV loading dose (complete) then 100mg IV BID              - transitioned to PO on 10/16              - Could increase to 150mg BID if increased seizure burden but stable on current dosing   - May reconsider third agent should seizure burden change drastically (ie Grand Mal, etc)   - Lorazepam PRN for any seizure activity > 2 minutes or increased clusters of seizure like activity   - Seizure precautions   - Neuro checks      H/o L frontoparietal glioblastoma s/p resection, chemotherapy and radiation (completed May 2021), with recurrence of malignancy 2023   Cognitive and functional impairment due to glioblastoma and chemoradiation   Seen by oncology on this visit, repeat imaging stable from September. Started on trial of steroid, eval symptom improvement. Patient has developed worsening difficulty with swallowing, now eating more. Reduced decadron to 2 mg IV BID from 4 mg BID on 10/17 however patient developed worsening headache on 10/19 so dose increased.  - Oncology consulted, appreciate input - signed off, s/p bevacizumab 10/18,  next due 11/1 (appointment made).  - Rad/Onc consulted, RT started on 10/12 for 10 fractions - finished on 10/25  - Dexamethasone 4mg IV BID  (10/9 -  ) - waiting to hear back re: steroid dosing  - TMP-SMX daily for PCP PPX (pt had hx of PJP)   - PT/OT consulted, appreciate input  Rec TCU vs LTC. Family and patient hopeful for TCU - per , she was functional prior to admission and hopes to return to that level of functionality. I talked to [facility] admissions  10/25. Will revisit the case and may be helpful for our team to call the group home to further inquire about her baseline functionality and what the facility can offer and at what point they can take her back.   Also may be helpful to talk to PT/OT directly.      Likely aspiration pneumonitis  Coughing with medications, significant water suctioned.  Felt a little short of breath afterwords, which she felt improved with a duoneb.  On exam, she does have scattered bilateral wheezes present.   - DuoNeb QID PRN   - SLP consult completed, appreciate input. Soft diet level 6 with thin liquids.     Depression   - Continue PTA Buspirone and Fluoxetine      H/o DVT   - Continue PTA rivaroxaban      HTN   - Continue PTA Amlodipine      Hemorrhoids   -topical preparation H  -monitor           Diet: Snacks/Supplements Adult: Other; Send Berry Magic Cup with dinner tray and allow pt/RN to order prn also (Also likes orange if in stock. NO van or ora); With Meals  Regular Diet Adult Thin Liquids (level 0)    DVT Prophylaxis: DOAC  Martino Catheter: Not present  Lines: None     Cardiac Monitoring: None  Code Status: Full Code      Clinically Significant Risk Factors                  # Hypertension: Noted on problem list                   Disposition Plan      Expected Discharge Date: 10/26/2023      Destination: assisted living;nursing home  Discharge Comments: Done w/ radiation on 10/25 then patient would prefer TCU to try and get stronger            Drea Pickering MD (Sally)  Internal Medicine/Pediatrics  Hospitalist    Hospitalist Service, GOLD TEAM 10  [facility]  Securely message with Onep[employer] (more info)  Text page via [facility] Paging/Directory   See signed in provider for up to date coverage information  ______________________________________________________________________    Interval History   No acute event overnight. Finished radiation today, feeling well. Wants to sleep. Reports  constipation but has had several BMs today.     Physical Exam   Vital Signs: Temp: 97.8  F (36.6  C) Temp src: Axillary BP: 129/73 Pulse: 71   Resp: 16 SpO2: 95 % O2 Device: None (Room air)    Weight: 155 lbs 3.26 oz    General: awake, alert, in no acute distress  HEENT: NCAT, sclera anicteric, no nasal discharge, MMM  CV: RRR  Resp: CTAB, no increased WOB  Abd: Soft, nontender, nondistended, +BS, no rebound or guarding  MSK: Mild peripheral edema, extremities warm and well perfused  Skin: warm, dry, no jaundice  Neuro: R sided weakness and contracture chronic      Medical Decision Making       50 MINUTES SPENT BY ME on the date of service doing chart review, history, exam, documentation & further activities per the note.      Data     I have personally reviewed the following data over the past 24 hrs:    8.7  \   10.2 (L)   / 164     136 103 26.4 (H) /  107 (H)   4.4 26 0.82 \       Imaging results reviewed over the past 24 hrs:   No results found for this or any previous visit (from the past 24 hour(s)).

## 2023-10-25 NOTE — PLAN OF CARE
Problem: Plan of Care - These are the overarching goals to be used throughout the patient stay.    Goal: Plan of Care Review  Description: The Plan of Care Review/Shift note should be completed every shift.  The Outcome Evaluation is a brief statement about your assessment that the patient is improving, declining, or no change.  This information will be displayed automatically on your shift note.  Outcome: Progressing     Problem: Swallowing Impairment  Goal: Optimal Eating/Swallowing without Aspiration  Outcome: Progressing   Goal Outcome Evaluation:  Disoriented to time. Heart rate 55-59. Radiation today. Neuro's are baseline and unchanged. No nausea. No pain. Ate 1/2 applesauce, oatmeal and omelet. Took pills with applesauce. No difficulty swallowing. Incontinent of small stool x 1 and using the purewick. Incontinent of urine in bed twice and bedding changed. Assist x 2 with the lift. Mepilex in place. Seizure pads on. Bed alarm on. Waiting for TCU placement.  at bedside.

## 2023-10-25 NOTE — PROGRESS NOTES
Care Management Follow Up    Length of Stay (days): 18    Expected Discharge Date: 10/26/2023     Concerns to be Addressed: discharge planning     Patient plan of care discussed at interdisciplinary rounds: Yes    Anticipated Discharge Disposition: Transitional Care, Long Term Care, Home Care, Assisted Living     Anticipated Discharge Services:    Anticipated Discharge DME:  (TBD)    Patient/family educated on Medicare website which has current facility and service quality ratings: yes  Education Provided on the Discharge Plan: Yes  Patient/Family in Agreement with the Plan: yes    Referrals Placed by CM/SW: Post Acute Facilities  Private pay costs discussed: Not applicable    Pending     -[facility]  P: [phone]  10/25: Left a voicemail for admissions inquiring about pt's referral, waiting to hear back.      -Celsa Feliciano  P: [phone]  10/25: Left a voicemail for admissions inquiring about pt's referral, waiting to hear back.      Declined  -[facility]- acuity too high, cannot meet patients needs, bed available  -[facility]- acuity too high, bed not available, cannot meet psychosocial needs  -[facility]- beds not available, long list of referrals for TCU  -[facility]: She is not short term rehab appropriate. At baseline she moves w/ Ax1 with an EZ stand. She gets assist with everything, including transfers.    Call back when pt more independent  Jed [facility]- because pt needs help getting fed, they do not have the staffing to   accommodate. Said we could call back if pt becomes more independent with feeding     Additional Information:  AAKASH Zapata,reviewed and called on the referrals made 10/24. Pt was declined by 4 TCU due to high Acuity.. ANNETTE Goss said they would review again if pt can feed herself. CAMRON talked with pt and pt's  about eating. Pt's  says he has been assisting her, because of the awkwardness of the bed and  tray. He also said she has been  "very shaky. SW talked with pt's nurse and she said that she has been feeding pt her pills too.When SW asked pt, she said. \"Yes. I can feed myself.\"      SW messaging with MD Pickering about pt. MD Pickering is getting a different story from pt's  on pt's independence before hospitalization.  [facility] also stated that pt can now go to any community TCU because she is done with radiation. Per MD young pt needs to have another infusion on Nov. 1st. MD Pickering is going to call [facility] admin to see if she if this will assist with acceptance.     SW to follow and assist with any other discharge needs that may arise. DAYNA Peres   5A beds:5220-40  5C beds 5417-32 (no BMT pt's)     Phone: [phone]  Pager: [phone]       "

## 2023-10-25 NOTE — PROGRESS NOTES
Care Management Follow Up     Length of Stay (days): 18     Expected Discharge Date: 10/26/2023     Concerns to be Addressed: discharge planning     Patient plan of care discussed at interdisciplinary rounds: Yes     Anticipated Discharge Disposition:   TCU placement as recommended by OT and Physical Therapy     Anticipated Discharge Services:  TCU placement as recommended by OT and Physical Therapy  Anticipated Discharge DME:  Not applicable at this time      Patient/family educated on Medicare website which has current facility and service quality ratings: Yes  Education Provided on the Discharge Plan: Yes  Patient/Family in Agreement with the Plan: Yes     Referrals Placed by CM/SW:     Pending    Kala Leblanc  P: [phone]  10/25: Left a voicemail for admissions inquiring about pt's referral, waiting to hear back.     Damarissudheer Blanchardor  P: [phone]  10/25: Left a voicemail for admissions inquiring about pt's referral, waiting to hear back.     [facility]- acuity too high, cannot meet patients needs, bed available    [place]- acuity too high, bed not available, cannot meet psychosocial needs    [facility]- beds not available, long list of referrals for TCU    [facility]- because pt needs help getting fed, they do not have the staffing to   accommodate. Said we could call back if pt becomes more independent with feeding       Private pay costs discussed:   SW informed pt's  that some SNF's may have private pay private room fee's (if pt is placed in a private room) and  states that this would be affordable     Additional Information:    CHW followed up on referrals made by the SW. Updates provided above. CHW will continue to follow up.      [email or web address]  [phone]

## 2023-10-25 NOTE — PLAN OF CARE
"BP (!) 150/73   Pulse 59   Temp 97.4  F (36.3  C) (Oral)   Resp 17   Ht 1.702 m (5' 7\")   Wt 71.1 kg (156 lb 12 oz)   SpO2 95%   BMI 24.55 kg/m      Pt slept comfortably during the night between cares. VSS on room air, afebrile. Denies pain and nausea. Turn and reposition q2h. Mepilex on sacrum, buttocks reddened but blanchable. Pt is incontinent of bowel and bladder. Purewick and chux pad in place. 1 small soft bm overnight, good urine output. Seizure precautions in place. Pt is weak on R side. Able to swallow 1-2 tablets whole in apple sauce without difficulty. Takes miralax in apple juice w/straw. Pt is A&Ox3, knows what year it is but is unsure of the the month, knows it is alfredo. Able to answer questions with short replies. Continue with current POC.    Goal Outcome Evaluation:    Problem: Plan of Care - These are the overarching goals to be used throughout the patient stay.    Goal: Absence of Hospital-Acquired Illness or Injury  Intervention: Identify and Manage Fall Risk  Recent Flowsheet Documentation  Taken 10/25/2023 0400 by Ruth Doyle RN  Safety Promotion/Fall Prevention: safety round/check completed  Taken 10/25/2023 0300 by Ruth Doyle RN  Safety Promotion/Fall Prevention: safety round/check completed  Taken 10/25/2023 0100 by Ruth Doyle RN  Safety Promotion/Fall Prevention: safety round/check completed  Taken 10/25/2023 0000 by Ruth Doyle RN  Safety Promotion/Fall Prevention: safety round/check completed  Taken 10/24/2023 2300 by Ruth Doyle RN  Safety Promotion/Fall Prevention: safety round/check completed  Taken 10/24/2023 2100 by Ruth Doyle RN  Safety Promotion/Fall Prevention: safety round/check completed  Taken 10/24/2023 2000 by Ruth Doyle RN  Safety Promotion/Fall Prevention: safety round/check completed  Intervention: Prevent Skin Injury  Recent Flowsheet Documentation  Taken 10/25/2023 0400 by Ruth Doyle RN  Body Position:   turned   right   " left  Taken 10/25/2023 0300 by Ruth Doyle RN  Body Position:   turned   right  Taken 10/25/2023 0100 by Ruth Doyle RN  Body Position:   turned   left  Taken 10/24/2023 2300 by Ruth Doyle RN  Body Position:   turned   right  Taken 10/24/2023 2100 by Ruth Doyle RN  Body Position:   turned   left  Intervention: Prevent and Manage VTE (Venous Thromboembolism) Risk  Recent Flowsheet Documentation  Taken 10/24/2023 2000 by Ruth Doyle RN  VTE Prevention/Management: SCDs (sequential compression devices) off     Problem: Seizure Disorder Comorbidity  Goal: Maintenance of Seizure Control  Outcome: Progressing     Problem: Hypertension Comorbidity  Goal: Blood Pressure in Desired Range  Outcome: Progressing  Intervention: Maintain Blood Pressure Management  Recent Flowsheet Documentation  Taken 10/24/2023 2000 by Ruth Doyle RN  Medication Review/Management: medications reviewed     Problem: Thought Process Alteration  Goal: Optimal Thought Clarity  Outcome: Progressing  Intervention: Minimize Safety Risk and Altered Thought  Recent Flowsheet Documentation  Taken 10/24/2023 2000 by Ruth Doyle RN  Enhanced Safety Measures: room near unit station     Problem: Swallowing Impairment  Goal: Optimal Eating/Swallowing without Aspiration  Outcome: Progressing  Intervention: Optimize Eating and Swallowing  Recent Flowsheet Documentation  Taken 10/24/2023 2000 by Ruth Doyle RN  Feeding/Eating Techniques: feeding assistance provided     Problem: Communication Impairment  Goal: Effective Communication Skills  Outcome: Progressing

## 2023-10-26 PROCEDURE — 120N000005 HC R&B MS OVERFLOW UMMC

## 2023-10-26 PROCEDURE — 250N000013 HC RX MED GY IP 250 OP 250 PS 637: Performed by: STUDENT IN AN ORGANIZED HEALTH CARE EDUCATION/TRAINING PROGRAM

## 2023-10-26 PROCEDURE — 250N000012 HC RX MED GY IP 250 OP 636 PS 637: Performed by: STUDENT IN AN ORGANIZED HEALTH CARE EDUCATION/TRAINING PROGRAM

## 2023-10-26 PROCEDURE — 99232 SBSQ HOSP IP/OBS MODERATE 35: CPT | Performed by: STUDENT IN AN ORGANIZED HEALTH CARE EDUCATION/TRAINING PROGRAM

## 2023-10-26 PROCEDURE — 250N000013 HC RX MED GY IP 250 OP 250 PS 637: Performed by: INTERNAL MEDICINE

## 2023-10-26 RX ORDER — ALBUTEROL SULFATE 90 UG/1
2 AEROSOL, METERED RESPIRATORY (INHALATION)
Status: DISCONTINUED | OUTPATIENT
Start: 2023-10-26 | End: 2023-10-26

## 2023-10-26 RX ORDER — ALBUTEROL SULFATE 90 UG/1
2 AEROSOL, METERED RESPIRATORY (INHALATION) EVERY 6 HOURS
Status: DISCONTINUED | OUTPATIENT
Start: 2023-10-26 | End: 2023-11-28 | Stop reason: HOSPADM

## 2023-10-26 RX ADMIN — LEVETIRACETAM 1250 MG: 750 TABLET, FILM COATED ORAL at 08:44

## 2023-10-26 RX ADMIN — POLYETHYLENE GLYCOL 3350 17 G: 17 POWDER, FOR SOLUTION ORAL at 15:08

## 2023-10-26 RX ADMIN — PANTOPRAZOLE SODIUM 40 MG: 40 TABLET, DELAYED RELEASE ORAL at 08:45

## 2023-10-26 RX ADMIN — FLUOXETINE 40 MG: 20 CAPSULE ORAL at 08:45

## 2023-10-26 RX ADMIN — BUSPIRONE HYDROCHLORIDE 30 MG: 30 TABLET ORAL at 20:34

## 2023-10-26 RX ADMIN — DEXAMETHASONE 2 MG: 2 TABLET ORAL at 20:33

## 2023-10-26 RX ADMIN — DEXAMETHASONE 2 MG: 2 TABLET ORAL at 08:44

## 2023-10-26 RX ADMIN — POLYETHYLENE GLYCOL 3350 17 G: 17 POWDER, FOR SOLUTION ORAL at 08:45

## 2023-10-26 RX ADMIN — SULFAMETHOXAZOLE AND TRIMETHOPRIM 1 TABLET: 800; 160 TABLET ORAL at 08:45

## 2023-10-26 RX ADMIN — THERA TABS 1 TABLET: TAB at 08:45

## 2023-10-26 RX ADMIN — PANTOPRAZOLE SODIUM 40 MG: 40 TABLET, DELAYED RELEASE ORAL at 17:36

## 2023-10-26 RX ADMIN — ALBUTEROL SULFATE 2 PUFF: 90 AEROSOL, METERED RESPIRATORY (INHALATION) at 16:57

## 2023-10-26 RX ADMIN — LACOSAMIDE 100 MG: 50 TABLET, FILM COATED ORAL at 20:27

## 2023-10-26 RX ADMIN — SENNOSIDES AND DOCUSATE SODIUM 1 TABLET: 50; 8.6 TABLET ORAL at 08:45

## 2023-10-26 RX ADMIN — LACOSAMIDE 100 MG: 50 TABLET, FILM COATED ORAL at 08:45

## 2023-10-26 RX ADMIN — ALBUTEROL SULFATE 2 PUFF: 90 AEROSOL, METERED RESPIRATORY (INHALATION) at 23:45

## 2023-10-26 RX ADMIN — BUSPIRONE HYDROCHLORIDE 30 MG: 30 TABLET ORAL at 08:46

## 2023-10-26 RX ADMIN — RIVAROXABAN 20 MG: 10 TABLET, FILM COATED ORAL at 17:36

## 2023-10-26 RX ADMIN — AMLODIPINE BESYLATE 5 MG: 5 TABLET ORAL at 08:45

## 2023-10-26 RX ADMIN — LEVETIRACETAM 1250 MG: 750 TABLET, FILM COATED ORAL at 20:31

## 2023-10-26 ASSESSMENT — ACTIVITIES OF DAILY LIVING (ADL)
ADLS_ACUITY_SCORE: 59
ADLS_ACUITY_SCORE: 65
ADLS_ACUITY_SCORE: 61
ADLS_ACUITY_SCORE: 65
ADLS_ACUITY_SCORE: 59
ADLS_ACUITY_SCORE: 65

## 2023-10-26 NOTE — PROGRESS NOTES
[facility]    Medicine Progress Note - Hospitalist Service, GOLD TEAM 10    Date of Admission:  10/6/2023    Assessment & Plan   Olga Bailey is a 78 year old female admitted on 10/6/2023. She has history of L frontoparietal glioblastoma s/p resection, chemotherapy and radiation with remission in 2021, recently found to have recurrence in September 2023, with baseline cognitive and functional impairment, h/o seizures, h/o DVT on xarelto, HTN, and depression who presented 10/6/23 with acute worsening of baseline aphasia as well as new facial twitching. Admitted to internal medicine for further work up and management. Neurology consulted and patient found to be having partial seizures for which lacosamide was added to keppra with improvement in seizure activity. Continued radiotherapy while on [facility] but has now transferred to [place] for concomitant bevacizumab. Tolerated bevacizumab well however developed worsening headaches and tiredness at reduced dose of steroids so increased back up to 4 mg BID per rad onc. Completed 10 days of radiation on 10/25/23. Medically ready to discharge to hopefully TCU.     Partial Seizure, previously controlled  Worsened chronic aphasia   Difficulty Swallowing  Presented with acute (< 1 day) worsening of aphasia and new facial twitching with abnormal mouth movements concerning for partial seizure.  She underwent stroke eval in the ED which was reassuring against acute CVA,   Given recent recurrence of glioblastoma, there was concern for spread or advance of disease, though both CT and MRI were reassuring to stability of current malignancy from last imaging (9/2023).  No metabolic derangements to explain acute change, and norm WBC with no focal findings was reassuring against acute infection.  Neurology consulted and felt movements were consistent with partial seizure, lacosamide was added, though subsequent EEG showed some degree  of persistent seizure activity, and further brief clinical seizures were observed.  Neurologist discussed risk/benefit of a possible third antiepileptic medication (which could carry a risk of heavier sedation), the family elected to remain on the two current medications for quality of life reasons.  - Neurology consulted, appreciate thoughtful input  - Continue levetiracetam 1250 mg BID   - Lacosamide was started with 200mg IV loading dose (complete) then 100mg IV BID              - transitioned to PO on 10/16              - Could increase to 150mg BID if increased seizure burden but stable on current dosing   - May reconsider third agent should seizure burden change drastically (ie Grand Mal, etc)   - Lorazepam PRN for any seizure activity > 2 minutes or increased clusters of seizure like activity   - Seizure precautions   - Neuro checks      H/o L frontoparietal glioblastoma s/p resection, chemotherapy and radiation (completed May 2021), with recurrence of malignancy 2023   Cognitive and functional impairment due to glioblastoma and chemoradiation   Seen by oncology on this visit, repeat imaging stable from September. Started on trial of steroid, eval symptom improvement. Patient has developed worsening difficulty with swallowing, now eating more. Reduced decadron to 2 mg IV BID from 4 mg BID on 10/17 however patient developed worsening headache on 10/19 so dose increased.  - Oncology consulted, appreciate input - signed off, s/p bevacizumab 10/18,  next due 11/1 (appointment made).  - Rad/Onc consulted, RT started on 10/12 for 10 fractions - finished on 10/25  - Dexamethasone 4mg IV BID  (10/9 - 10/25) - headache resolved and no new neuro symptoms. Start tapering as of 10/26 - 2 mg BID x 2 weeks (10/26-11/9), 2 mg daily x 2 weeks (11/10-11/23), then stop.  - TMP-SMX daily for PCP PPX (pt had hx of PJP)   - PT/OT consulted, appreciate input  Rec TCU vs LTC. Family and patient hopeful for TCU - per , she  was functional prior to admission and hopes to return to that level of functionality. I talked to [facility] admissions 10/25. They will revisit the case and may be helpful for our team to call the [facility] to further inquire about her baseline functionality and what the facility can offer and at what point they can take her back. Awaiting placement. Also keep in mind she has bevacizumab infusion on 11/1 and will need to be at a facility that will allow her to go to this infusion appointment.     Likely aspiration pneumonitis  Coughing with medications, significant water suctioned.  Felt a little short of breath afterwords, which she felt improved with a duoneb.  On exam, she does have scattered bilateral wheezes present.   - brianna albuterol MDI q6H while awake   - DuoNeb QID PRN   - SLP consult completed, appreciate input. Soft diet level 6 with thin liquids.     Depression   - Continue PTA Buspirone and Fluoxetine      H/o DVT   - Continue PTA rivaroxaban      HTN   - Continue PTA Amlodipine      Hemorrhoids   -topical preparation H  -monitor           Diet: Snacks/Supplements Adult: Other; Send Berry Magic Cup with dinner tray and allow pt/RN to order prn also (Also likes orange if in stock. NO van or ora); With Meals  Regular Diet Adult Thin Liquids (level 0)    DVT Prophylaxis: DOAC  Martino Catheter: Not present  Lines: None     Cardiac Monitoring: None  Code Status: Full Code      Clinically Significant Risk Factors                  # Hypertension: Noted on problem list                   Disposition Plan      Expected Discharge Date: 10/26/2023      Destination: assisted living;nursing home  Discharge Comments: Done w/ radiation on 10/25 then patient would prefer TCU to try and get stronger  awaiting TCU placement, medically ready          Drea Pickering MD (Sally)  Internal Medicine/Pediatrics  Hospitalist    Hospitalist Service, GOLD TEAM [address] [place] [address]  Securely message  with Laila (more info)  Text page via [facility] Paging/Directory   See signed in provider for up to date coverage information  ______________________________________________________________________    Interval History   No acute events overnight. Doing well, no pain, eating ok, feeling like she's being 'lazy' and sleeping a lot. No new headache or vision changes or neuro symptoms.     Physical Exam   Vital Signs: Temp: 98.4  F (36.9  C) Temp src: Oral BP: 123/74 Pulse: 69   Resp: 16 SpO2: 95 % O2 Device: None (Room air)    Weight: 155 lbs 3.26 oz    General: awake, alert, in no acute distress  HEENT: NCAT, sclera anicteric, no nasal discharge, MMM  CV: RRR  Resp: CTAB, no increased WOB  Abd: Soft, nontender, nondistended, +BS, no rebound or guarding  MSK: Mild peripheral edema, extremities warm and well perfused  Skin: warm, dry, no jaundice  Neuro: R sided weakness and contracture chronic    Medical Decision Making       40 MINUTES SPENT BY ME on the date of service doing chart review, history, exam, documentation & further activities per the note.      Data       No new labs.

## 2023-10-26 NOTE — PROGRESS NOTES
Care Management Follow Up     Length of Stay (days): 19     Expected Discharge Date: 10/26/2023     Concerns to be Addressed: discharge planning     Patient plan of care discussed at interdisciplinary rounds: Yes     Anticipated Discharge Disposition:   TCU placement as recommended by OT and Physical Therapy     Anticipated Discharge Services:  TCU placement as recommended by OT and Physical Therapy  Anticipated Discharge DME:  Not applicable at this time      Patient/family educated on Medicare website which has current facility and service quality ratings: Yes  Education Provided on the Discharge Plan: Yes  Patient/Family in Agreement with the Plan: Yes     Referrals Placed by CM/SW: Post Acute Care Facilities  Private pay costs discussed:   SW informed pt's  that some SNF's may have private pay private room fee's (if pt is placed in a private room) and  states that this would be affordable     Additional Information:  SW is following pt for discharge planning.   Pt completed her last radiation treatment on 10/25/2023 and is medically ready for discharge.  OT and PT are recommending a TCU stay.    The following TCU's/SNF's have assessed and declined pt for admit:  - [facility], recommended return to assisted living, indicated that pt is not short term rehab appropriate, this SW spoke with Giana Akbar in Admissions today who states that she spoke with the 5A provider yesterday was well as with Giana's Supervisor who also states she is not TCU appropriate  - [facility], assessed and declined as pt is a feeder and from a staffing perspective, they cannot provide this level of care  - [facility], assessed and declined for admit stating that pt's acuity level is to high and they cannot meet pt's needs  - [facility], is full with a lengthy wait list  - [facility], assessed and declined indicating that pt's acuity is to high and cannot meet psychosocial  needs.    Decisions regarding acceptance are pending from the following TCU's/SNF's:  - [facility], [facility] phoned Admissions and left message for a representative to call in regards to acceptance   - CAMRON [facility] sent a referral (via [employer]) to this facility on 10/24, CAMRON notes that this fax failed.  CAMRON re-faxed the referral on 10/26/2023 and it appear that the fax was successfully sent.    CAMRON received a call from Dr. Drea Pickering who confirms readiness for discharge.  Per Dr. Pickering, pt will not have additional radiation.  Per Dr Pickering, pt will have a chemo infusion on 11/1/2024 and every 2 weeks thereafter.  Per Dr. Pickering, pt is not a feeder.  Pt's  chose to feed pt as pt had difficulty indep eating because of tray table position, tray position.  CAMRON phoned pt's floor nurse (Deana) who states that pt is a feeder.      CAM[facility] will continue to follow for discharge planning.    DAYNA Nuñez, covering for DAYNA Peres on 10/26/2023 only  Social Work, 6A  Phone:  [phone]  Pager:  [phone]  10/26/2023

## 2023-10-26 NOTE — PLAN OF CARE
"/69 (BP Location: Right arm)   Pulse 77   Temp 98.8  F (37.1  C) (Axillary)   Resp 16   Ht 1.702 m (5' 7\")   Wt 70.4 kg (155 lb 3.3 oz)   SpO2 96%   BMI 24.31 kg/m      Afebrile; VSS on RA. No complaints of pain or nausea. Ate two meals. Takes pills with applesauce. Incontinent of stool x2; one small and one large loose BM. Voiding adequate UOP via external catheter.  requesting nebs be changed back to scheduled rather than PRN. Awaiting TCU placement.     Goal Outcome Evaluation:    Plan of Care Reviewed With: patient, spouse  Overall Patient Progress: improvingOverall Patient Progress: improving    Problem: Plan of Care - These are the overarching goals to be used throughout the patient stay.    Goal: Plan of Care Review  Description: The Plan of Care Review/Shift note should be completed every shift.  The Outcome Evaluation is a brief statement about your assessment that the patient is improving, declining, or no change.  This information will be displayed automatically on your shift note.  Outcome: Progressing  Flowsheets (Taken 10/25/2023 2245)  Plan of Care Reviewed With:   patient   spouse  Overall Patient Progress: improving     Problem: Seizure Disorder Comorbidity  Goal: Maintenance of Seizure Control  Outcome: Progressing  Intervention: Maintain Seizure-Symptom Control  Recent Flowsheet Documentation  Taken 10/25/2023 1600 by Adelaide Durand, RN  Seizure Precautions:   activity supervised   clutter-free environment maintained   emergency equipment at bedside   side rails padded     Problem: Hypertension Comorbidity  Goal: Blood Pressure in Desired Range  Outcome: Progressing  Intervention: Maintain Blood Pressure Management  Recent Flowsheet Documentation  Taken 10/25/2023 2000 by Adelaide Durand, RN  Medication Review/Management:   medications reviewed   high-risk medications identified  Taken 10/25/2023 1600 by Adelaide Durand, RN  Medication Review/Management:   medications " reviewed   high-risk medications identified     Problem: Thought Process Alteration  Goal: Optimal Thought Clarity  Outcome: Progressing  Intervention: Minimize Safety Risk and Altered Thought  Recent Flowsheet Documentation  Taken 10/25/2023 2000 by Adelaide Durand RN  Enhanced Safety Measures: room near unit station  Taken 10/25/2023 1600 by Adelaide Durand RN  Sensory Stimulation Regulation: lighting decreased  Enhanced Safety Measures: room near unit station     Problem: Swallowing Impairment  Goal: Optimal Eating/Swallowing without Aspiration  Outcome: Progressing  Intervention: Optimize Eating and Swallowing  Recent Flowsheet Documentation  Taken 10/25/2023 1600 by Adelaide Durand RN  Aspiration Precautions:   awake/alert before oral intake   oral hygiene care promoted  Feeding/Eating Techniques: feeding assistance provided     Problem: Communication Impairment  Goal: Effective Communication Skills  Outcome: Progressing  Intervention: Optimize Communication Skills  Recent Flowsheet Documentation  Taken 10/25/2023 1600 by Adelaide Durand RN  Communication Enhancement Strategies: call light answered in person

## 2023-10-26 NOTE — PLAN OF CARE
Alert and disoriented to time, place. Denies pain and nausea. Lungs sounds diminished, on room air and oxygen saturation 98%. Repositioned Q 2 hours last at 0555. Pure wick in place and voiding, no BM. Perineal care done. Bed alarm on and soft call light in reach.  Problem: Plan of Care - These are the overarching goals to be used throughout the patient stay.    Goal: Plan of Care Review  Description: The Plan of Care Review/Shift note should be completed every shift.  The Outcome Evaluation is a brief statement about your assessment that the patient is improving, declining, or no change.  This information will be displayed automatically on your shift note.  Outcome: Progressing  Goal: Absence of Hospital-Acquired Illness or Injury  Intervention: Identify and Manage Fall Risk  Recent Flowsheet Documentation  Taken 10/26/2023 0045 by Annie Jaime RN  Safety Promotion/Fall Prevention:   clutter free environment maintained   assistive device/personal items within reach   room near nurse's station   safety round/check completed  Intervention: Prevent Skin Injury  Recent Flowsheet Documentation  Taken 10/26/2023 0555 by Annie Jaime RN  Body Position:   right   turned   heels elevated  Taken 10/26/2023 0332 by Annie Jaime RN  Body Position:   left   turned  Taken 10/26/2023 0054 by Annie Jaime RN  Body Position:   right   turned   heels elevated  Intervention: Prevent and Manage VTE (Venous Thromboembolism) Risk  Recent Flowsheet Documentation  Taken 10/26/2023 0045 by Annie Jaime RN  VTE Prevention/Management: SCDs (sequential compression devices) off   Goal Outcome Evaluation:

## 2023-10-27 ENCOUNTER — APPOINTMENT (OUTPATIENT)
Dept: PHYSICAL THERAPY | Facility: CLINIC | Age: 78
DRG: 054 | End: 2023-10-27
Attending: INTERNAL MEDICINE
Payer: MEDICARE

## 2023-10-27 PROCEDURE — 250N000013 HC RX MED GY IP 250 OP 250 PS 637: Performed by: STUDENT IN AN ORGANIZED HEALTH CARE EDUCATION/TRAINING PROGRAM

## 2023-10-27 PROCEDURE — 99233 SBSQ HOSP IP/OBS HIGH 50: CPT | Performed by: STUDENT IN AN ORGANIZED HEALTH CARE EDUCATION/TRAINING PROGRAM

## 2023-10-27 PROCEDURE — 250N000012 HC RX MED GY IP 250 OP 636 PS 637: Performed by: STUDENT IN AN ORGANIZED HEALTH CARE EDUCATION/TRAINING PROGRAM

## 2023-10-27 PROCEDURE — 250N000013 HC RX MED GY IP 250 OP 250 PS 637: Performed by: INTERNAL MEDICINE

## 2023-10-27 PROCEDURE — 97530 THERAPEUTIC ACTIVITIES: CPT | Mod: GP

## 2023-10-27 PROCEDURE — 999N000226 HC STATISTIC SLP IP EVAL DEFER

## 2023-10-27 PROCEDURE — 120N000005 HC R&B MS OVERFLOW UMMC

## 2023-10-27 RX ADMIN — THERA TABS 1 TABLET: TAB at 09:08

## 2023-10-27 RX ADMIN — BUSPIRONE HYDROCHLORIDE 30 MG: 30 TABLET ORAL at 19:49

## 2023-10-27 RX ADMIN — AMLODIPINE BESYLATE 5 MG: 5 TABLET ORAL at 09:07

## 2023-10-27 RX ADMIN — POLYETHYLENE GLYCOL 3350 17 G: 17 POWDER, FOR SOLUTION ORAL at 09:08

## 2023-10-27 RX ADMIN — SULFAMETHOXAZOLE AND TRIMETHOPRIM 1 TABLET: 800; 160 TABLET ORAL at 09:07

## 2023-10-27 RX ADMIN — DEXAMETHASONE 2 MG: 2 TABLET ORAL at 19:44

## 2023-10-27 RX ADMIN — LACOSAMIDE 100 MG: 50 TABLET, FILM COATED ORAL at 09:08

## 2023-10-27 RX ADMIN — SENNOSIDES AND DOCUSATE SODIUM 1 TABLET: 50; 8.6 TABLET ORAL at 19:44

## 2023-10-27 RX ADMIN — LACOSAMIDE 100 MG: 50 TABLET, FILM COATED ORAL at 19:44

## 2023-10-27 RX ADMIN — SENNOSIDES AND DOCUSATE SODIUM 1 TABLET: 50; 8.6 TABLET ORAL at 09:08

## 2023-10-27 RX ADMIN — LEVETIRACETAM 1250 MG: 750 TABLET, FILM COATED ORAL at 09:07

## 2023-10-27 RX ADMIN — PANTOPRAZOLE SODIUM 40 MG: 40 TABLET, DELAYED RELEASE ORAL at 09:08

## 2023-10-27 RX ADMIN — PANTOPRAZOLE SODIUM 40 MG: 40 TABLET, DELAYED RELEASE ORAL at 17:12

## 2023-10-27 RX ADMIN — POLYETHYLENE GLYCOL 3350 17 G: 17 POWDER, FOR SOLUTION ORAL at 19:45

## 2023-10-27 RX ADMIN — ALBUTEROL SULFATE 2 PUFF: 90 AEROSOL, METERED RESPIRATORY (INHALATION) at 13:37

## 2023-10-27 RX ADMIN — DEXAMETHASONE 2 MG: 2 TABLET ORAL at 09:08

## 2023-10-27 RX ADMIN — FLUOXETINE 40 MG: 20 CAPSULE ORAL at 09:08

## 2023-10-27 RX ADMIN — RIVAROXABAN 20 MG: 10 TABLET, FILM COATED ORAL at 17:12

## 2023-10-27 RX ADMIN — BUSPIRONE HYDROCHLORIDE 30 MG: 30 TABLET ORAL at 09:08

## 2023-10-27 RX ADMIN — LEVETIRACETAM 1250 MG: 750 TABLET, FILM COATED ORAL at 19:44

## 2023-10-27 ASSESSMENT — ACTIVITIES OF DAILY LIVING (ADL)
ADLS_ACUITY_SCORE: 59
ADLS_ACUITY_SCORE: 60
ADLS_ACUITY_SCORE: 59
ADLS_ACUITY_SCORE: 60
ADLS_ACUITY_SCORE: 60
ADLS_ACUITY_SCORE: 59
ADLS_ACUITY_SCORE: 59
ADLS_ACUITY_SCORE: 61
ADLS_ACUITY_SCORE: 61

## 2023-10-27 NOTE — PROGRESS NOTES
Care Management Follow Up     Length of Stay (days): 20     Expected Discharge Date: 10/26/2023     Concerns to be Addressed: discharge planning     Patient plan of care discussed at interdisciplinary rounds: Yes     Anticipated Discharge Disposition:   TCU placement as recommended by OT and Physical Therapy     Anticipated Discharge Services:  TCU placement as recommended by OT and Physical Therapy  Anticipated Discharge DME:  Not applicable at this time      Patient/family educated on Medicare website which has current facility and service quality ratings: Yes  Education Provided on the Discharge Plan: Yes  Patient/Family in Agreement with the Plan: Yes     Referrals Placed by CM/SW: Post Acute Care Facilities  Private pay costs discussed:   SW informed pt's  that some SNF's may have private pay private room fee's (if pt is placed in a private room) and  states that this would be affordable     Additional Information:  SW is following pt for discharge planning.   Pt completed her last radiation treatment on 10/25/2023 and is medically ready for discharge.  OT and PT are recommending a TCU stay.  Per Dr. Drea Pickering  pt will not have additional radiation.  Per Dr Pickering, pt will have a chemo infusion on 11/1/2024 and every 2 weeks thereafter. Per chart review, pt now has 1 nursing progress note entry (made on 10/27/2023) indicating that pt was able to feed self.     The following TCU's/SNF's have assessed and declined pt for admit:  - [facility], recommended return to assisted living, indicated that pt is not short term rehab appropriate, this SW spoke with Giana Akbar in Admissions (10/26/2023) who states that she spoke with the 5A provider yesterday was well as with Giana's Supervisor who also states she is not TCU appropriate  - [facility], assessed and declined as pt is a feeder and from a staffing perspective, they cannot provide this level of care  - [facility],  assessed and declined for admit stating that pt's acuity level is to high and they cannot meet pt's needs  - [facility], is full with a lengthy wait list  - [facility], assessed and declined indicating that pt's acuity is to high and cannot meet psychosocial needs  - [facility], pt assessed and declined for admit as they cannot meet pt's needs, acuity is to high and they do not have an available room with a overhead lift.     Decisions regarding acceptance are pending from the following TCU's/SNF's:    - [facility]  left a message for Admissions to call in regards to acceptance.  Addendum:  CAMRON received a follow up call from Eliza in Admissions stating that they are not accepting any admit due to staffing.     CAM[facility] will continue to follow for discharge planning.     DAYNA Nuñez, covering for DAYNA Peres on 10/27/2023 only  Social Work, 6A  Phone:  [phone]  Pager:  [phone]  10/27/2023

## 2023-10-27 NOTE — PLAN OF CARE
Assumed cares at 1530.     A&OX4, though she only knew year and month, but not date. VSS on RA. Denies pain. Denies N/V. Right sided weakness. Good appetite. Fed herself dinner. Up in chair much of shift. Lift with Ax2. Repo done Q2H. Purewick in use with AUOP. No BM. Visiting with  and resting between cares.     Problem: Plan of Care - These are the overarching goals to be used throughout the patient stay.    Goal: Plan of Care Review  Description: The Plan of Care Review/Shift note should be completed every shift.  The Outcome Evaluation is a brief statement about your assessment that the patient is improving, declining, or no change.  This information will be displayed automatically on your shift note.  Outcome: Progressing     Problem: Seizure Disorder Comorbidity  Goal: Maintenance of Seizure Control  Outcome: Progressing     Problem: Hypertension Comorbidity  Goal: Blood Pressure in Desired Range  Outcome: Progressing  Intervention: Maintain Blood Pressure Management  Recent Flowsheet Documentation  Taken 10/27/2023 1700 by Yudy Adkins RN  Medication Review/Management: medications reviewed     Problem: Thought Process Alteration  Goal: Optimal Thought Clarity  Outcome: Progressing  Intervention: Minimize Safety Risk and Altered Thought  Recent Flowsheet Documentation  Taken 10/27/2023 1700 by Yudy Adkins, RN  Sensory Stimulation Regulation:   care clustered   quiet environment promoted   auditory stimulation minimized  Enhanced Safety Measures: room near unit station     Problem: Swallowing Impairment  Goal: Optimal Eating/Swallowing without Aspiration  Outcome: Progressing     Problem: Communication Impairment  Goal: Effective Communication Skills  Outcome: Progressing  Intervention: Optimize Communication Skills  Recent Flowsheet Documentation  Taken 10/27/2023 1700 by Yudy Adkins, RN  Communication Enhancement Strategies:   call light answered in person   extra time allowed for response    family involved in communication plan

## 2023-10-27 NOTE — PLAN OF CARE
"/73 (BP Location: Left arm)   Pulse 77   Temp 98.3  F (36.8  C) (Oral)   Resp 16   Ht 1.702 m (5' 7\")   Wt 70.4 kg (155 lb 3.3 oz)   SpO2 95%   BMI 24.31 kg/m      VSS on room air. Disoriented to time. Denies pain and nausea. Lungs sound diminished on room air; oxygen saturation >93% on room air; scheduled inhaler given x1. Q2hr turns; purewick in place; voiding well. Bed alarm on; soft call light in reach. Continue with plan of care; will notify provider with any changes.     Goal Outcome Evaluation:      Plan of Care Reviewed With: patient    Overall Patient Progress: improving      Problem: Plan of Care - These are the overarching goals to be used throughout the patient stay.    Goal: Plan of Care Review  Description: The Plan of Care Review/Shift note should be completed every shift.  The Outcome Evaluation is a brief statement about your assessment that the patient is improving, declining, or no change.  This information will be displayed automatically on your shift note.  Outcome: Progressing  Flowsheets (Taken 10/26/2023 1913)  Plan of Care Reviewed With: patient  Overall Patient Progress: improving     Problem: Seizure Disorder Comorbidity  Goal: Maintenance of Seizure Control  Outcome: Progressing  Intervention: Maintain Seizure-Symptom Control  Recent Flowsheet Documentation  Taken 10/26/2023 1530 by Deana Lloyd, RN  Seizure Precautions:   activity supervised   clutter-free environment maintained   emergency equipment at bedside   side rails padded  Taken 10/26/2023 1230 by Deana Lloyd, RN  Seizure Precautions:   activity supervised   clutter-free environment maintained   emergency equipment at bedside   side rails padded  Taken 10/26/2023 0830 by Deana Lloyd, RN  Seizure Precautions:   activity supervised   clutter-free environment maintained   emergency equipment at bedside   side rails padded     Problem: Hypertension Comorbidity  Goal: Blood Pressure in Desired " Range  Outcome: Progressing  Intervention: Maintain Blood Pressure Management  Recent Flowsheet Documentation  Taken 10/26/2023 1530 by Deana Lloyd RN  Medication Review/Management: medications reviewed  Taken 10/26/2023 1230 by Deana Lloyd RN  Medication Review/Management: medications reviewed  Taken 10/26/2023 0830 by Deana Lloyd RN  Medication Review/Management: medications reviewed     Problem: Thought Process Alteration  Goal: Optimal Thought Clarity  Outcome: Progressing  Intervention: Minimize Safety Risk and Altered Thought  Recent Flowsheet Documentation  Taken 10/26/2023 1530 by Deana Lloyd RN  Enhanced Safety Measures: room near unit station  Taken 10/26/2023 1230 by Deana Lloyd RN  Enhanced Safety Measures: room near unit station  Taken 10/26/2023 0830 by Deana Lloyd RN  Sensory Stimulation Regulation:   lighting decreased   quiet environment promoted  Enhanced Safety Measures: room near unit station     Problem: Swallowing Impairment  Goal: Optimal Eating/Swallowing without Aspiration  Outcome: Progressing  Intervention: Optimize Eating and Swallowing  Recent Flowsheet Documentation  Taken 10/26/2023 1530 by Deana Lloyd RN  Aspiration Precautions:   awake/alert before oral intake   oral hygiene care promoted  Taken 10/26/2023 1230 by Deana Lloyd RN  Aspiration Precautions:   awake/alert before oral intake   oral hygiene care promoted  Taken 10/26/2023 0830 by Deana Lloyd RN  Aspiration Precautions:   awake/alert before oral intake   oral hygiene care promoted  Feeding/Eating Techniques: feeding assistance provided     Problem: Communication Impairment  Goal: Effective Communication Skills  Outcome: Progressing  Intervention: Optimize Communication Skills  Recent Flowsheet Documentation  Taken 10/26/2023 0830 by Deana Lloyd RN  Communication Enhancement Strategies: call light answered in person

## 2023-10-27 NOTE — PLAN OF CARE
Goal Outcome Evaluation:  Major Shift Events:  Overnight, pt with intermittently elevated blood pressures up to the 140s/70s-80s, but within parameters. Otherwise, VSS on RA. Pt disoriented to time.  Pt denied pain or nausea. No vomiting. No PRNs required. No BM overnight. Repeatedly refused offers for PO fluids overnight. Voiding spontaneously via purewick external catheter. Q2hr turns Ax2.     For vital signs and complete assessments, please see documentation flowsheets.      Problem: Plan of Care - These are the overarching goals to be used throughout the patient stay.    Goal: Plan of Care Review  Description: The Plan of Care Review/Shift note should be completed every shift.  The Outcome Evaluation is a brief statement about your assessment that the patient is improving, declining, or no change.  This information will be displayed automatically on your shift note.  Outcome: Progressing  Goal: Absence of Hospital-Acquired Illness or Injury  Intervention: Identify and Manage Fall Risk  Recent Flowsheet Documentation  Taken 10/27/2023 0554 by Heidy Cordoba RN  Safety Promotion/Fall Prevention: safety round/check completed  Taken 10/27/2023 0355 by Heidy Cordoba RN  Safety Promotion/Fall Prevention:   activity supervised   assistive device/personal items within reach   clutter free environment maintained   increased rounding and observation   increase visualization of patient   lighting adjusted   patient and family education   room organization consistent   safety round/check completed   supervised activity  Taken 10/27/2023 0145 by Heidy Cordoba RN  Safety Promotion/Fall Prevention: safety round/check completed  Taken 10/26/2023 2334 by Heidy Cordoba RN  Safety Promotion/Fall Prevention:   activity supervised   assistive device/personal items within reach   clutter free environment maintained   increased rounding and observation   increase visualization of patient   lighting adjusted   patient  and family education   room organization consistent   safety round/check completed   supervised activity  Taken 10/26/2023 1930 by Heidy Cordoba RN  Safety Promotion/Fall Prevention:   safety round/check completed   activity supervised   assistive device/personal items within reach   clutter free environment maintained   increased rounding and observation   increase visualization of patient   patient and family education   room organization consistent   supervised activity  Intervention: Prevent Skin Injury  Recent Flowsheet Documentation  Taken 10/27/2023 0554 by Heidy Cordoba RN  Body Position:   turned   left   side-lying  Taken 10/27/2023 0355 by Heidy Cordoba RN  Body Position:   turned   right   side-lying  Taken 10/27/2023 0145 by Heidy Cordoba RN  Body Position:   turned   left   side-lying  Taken 10/26/2023 2334 by Heidy Cordoba RN  Body Position: (pt found to be left side lying; repositioned to be right side lying)   turned   right   side-lying  Intervention: Prevent and Manage VTE (Venous Thromboembolism) Risk  Recent Flowsheet Documentation  Taken 10/26/2023 1930 by Heidy Cordoba RN  VTE Prevention/Management: SCDs (sequential compression devices) off     Problem: Seizure Disorder Comorbidity  Goal: Maintenance of Seizure Control  Outcome: Progressing     Problem: Hypertension Comorbidity  Goal: Blood Pressure in Desired Range  Outcome: Progressing  Intervention: Maintain Blood Pressure Management  Recent Flowsheet Documentation  Taken 10/27/2023 0355 by Heidy Cordoba RN  Medication Review/Management: medications reviewed  Taken 10/26/2023 2334 by Heidy Cordoba RN  Medication Review/Management: medications reviewed  Taken 10/26/2023 1930 by Heidy Cordoba RN  Medication Review/Management: medications reviewed       Problem: Thought Process Alteration  Goal: Optimal Thought Clarity  Outcome: Progressing     Problem: Swallowing Impairment  Goal: Optimal Eating/Swallowing  without Aspiration  Outcome: Progressing     Problem: Communication Impairment  Goal: Effective Communication Skills  Outcome: Progressing

## 2023-10-27 NOTE — PLAN OF CARE
"Deferral - SLP orders received for swallow eval per family request. Chart reviewed and discussed with RN and primary MD. Per RN, pt is swallowing w/o c/f aspiration. Per MD, there is a question of pt needing supervision during meals. Note that pt has documented baseline cognitive deficits. VFSS was completed by this SLP on 10/19, swallow was found to be WFL without aspiration and the following recommendations were provided:    \"Recommend the pt advance to regular diet/thin liquids with assistance from caregivers to ensure she is following safe swallowing strategies (upright positioning, slow rate, fully alert). Recommend pt take meds as tolerated; given hx of difficulty taking meds via syringe, recommend \"syringe\" meds be first put into medicine cup and then pt can drink them with small controlled sips.\"     From SLP perspective, repeat swallow evaluation not indicated as the pt does not have oropharyngeal dysphagia evidenced by VFSS. D/t baseline cognitive deficits however, she likely will continue to require reminders/assistance from staff/caregivers to follow safe swallowing strategies if she is unable to recall these herself (i.e. sitting fully upright, not taking meds too quickly or via syringe, etc.). SLP will complete orders. Defer to OT for any management of concerns surrounding self-feeding.     "

## 2023-10-27 NOTE — PROGRESS NOTES
Helped Patient sit upright in bed for breakfast, Set try up at arm reach and Pt was able to feed her self.

## 2023-10-28 PROCEDURE — 99232 SBSQ HOSP IP/OBS MODERATE 35: CPT | Performed by: STUDENT IN AN ORGANIZED HEALTH CARE EDUCATION/TRAINING PROGRAM

## 2023-10-28 PROCEDURE — 250N000013 HC RX MED GY IP 250 OP 250 PS 637: Performed by: STUDENT IN AN ORGANIZED HEALTH CARE EDUCATION/TRAINING PROGRAM

## 2023-10-28 PROCEDURE — 250N000012 HC RX MED GY IP 250 OP 636 PS 637: Performed by: STUDENT IN AN ORGANIZED HEALTH CARE EDUCATION/TRAINING PROGRAM

## 2023-10-28 PROCEDURE — 120N000005 HC R&B MS OVERFLOW UMMC

## 2023-10-28 PROCEDURE — 250N000013 HC RX MED GY IP 250 OP 250 PS 637: Performed by: INTERNAL MEDICINE

## 2023-10-28 RX ADMIN — LACOSAMIDE 100 MG: 50 TABLET, FILM COATED ORAL at 20:25

## 2023-10-28 RX ADMIN — PANTOPRAZOLE SODIUM 40 MG: 40 TABLET, DELAYED RELEASE ORAL at 07:55

## 2023-10-28 RX ADMIN — PANTOPRAZOLE SODIUM 40 MG: 40 TABLET, DELAYED RELEASE ORAL at 17:15

## 2023-10-28 RX ADMIN — THERA TABS 1 TABLET: TAB at 07:55

## 2023-10-28 RX ADMIN — SENNOSIDES AND DOCUSATE SODIUM 1 TABLET: 50; 8.6 TABLET ORAL at 07:55

## 2023-10-28 RX ADMIN — POLYETHYLENE GLYCOL 3350 17 G: 17 POWDER, FOR SOLUTION ORAL at 14:11

## 2023-10-28 RX ADMIN — FLUOXETINE 40 MG: 20 CAPSULE ORAL at 07:55

## 2023-10-28 RX ADMIN — ALBUTEROL SULFATE 2 PUFF: 90 AEROSOL, METERED RESPIRATORY (INHALATION) at 12:53

## 2023-10-28 RX ADMIN — LEVETIRACETAM 1250 MG: 750 TABLET, FILM COATED ORAL at 07:55

## 2023-10-28 RX ADMIN — BUSPIRONE HYDROCHLORIDE 30 MG: 30 TABLET ORAL at 07:55

## 2023-10-28 RX ADMIN — SULFAMETHOXAZOLE AND TRIMETHOPRIM 1 TABLET: 800; 160 TABLET ORAL at 07:55

## 2023-10-28 RX ADMIN — LACOSAMIDE 100 MG: 50 TABLET, FILM COATED ORAL at 07:55

## 2023-10-28 RX ADMIN — BUSPIRONE HYDROCHLORIDE 30 MG: 30 TABLET ORAL at 20:25

## 2023-10-28 RX ADMIN — POLYETHYLENE GLYCOL 3350 17 G: 17 POWDER, FOR SOLUTION ORAL at 20:17

## 2023-10-28 RX ADMIN — SENNOSIDES AND DOCUSATE SODIUM 1 TABLET: 50; 8.6 TABLET ORAL at 20:17

## 2023-10-28 RX ADMIN — RIVAROXABAN 20 MG: 10 TABLET, FILM COATED ORAL at 17:15

## 2023-10-28 RX ADMIN — AMLODIPINE BESYLATE 5 MG: 5 TABLET ORAL at 07:55

## 2023-10-28 RX ADMIN — ALBUTEROL SULFATE 2 PUFF: 90 AEROSOL, METERED RESPIRATORY (INHALATION) at 20:22

## 2023-10-28 RX ADMIN — POLYETHYLENE GLYCOL 3350 17 G: 17 POWDER, FOR SOLUTION ORAL at 07:56

## 2023-10-28 RX ADMIN — DEXAMETHASONE 2 MG: 2 TABLET ORAL at 20:17

## 2023-10-28 RX ADMIN — DEXAMETHASONE 2 MG: 2 TABLET ORAL at 07:55

## 2023-10-28 RX ADMIN — LEVETIRACETAM 1250 MG: 750 TABLET, FILM COATED ORAL at 20:17

## 2023-10-28 ASSESSMENT — ACTIVITIES OF DAILY LIVING (ADL)
ADLS_ACUITY_SCORE: 60

## 2023-10-28 NOTE — PROGRESS NOTES
[facility]    Medicine Progress Note - Hospitalist Service, GOLD TEAM 10    Date of Admission:  10/6/2023    Assessment & Plan   Olga Bailey is a 78 year old female admitted on 10/6/2023. She has history of L frontoparietal glioblastoma s/p resection, chemotherapy and radiation with remission in 2021, recently found to have recurrence in September 2023, with baseline cognitive and functional impairment, h/o seizures, h/o DVT on xarelto, HTN, and depression who presented 10/6/23 with acute worsening of baseline aphasia as well as new facial twitching. Admitted to internal medicine for further work up and management. Neurology consulted and patient found to be having partial seizures for which lacosamide was added to keppra with improvement in seizure activity. Continued radiotherapy while on [facility] but has now transferred to [place] for concomitant bevacizumab. Tolerated bevacizumab well however developed worsening headaches and tiredness at reduced dose of steroids so increased back up to 4 mg BID per rad onc. Completed 10 days of radiation on 10/25/23. Medically ready to discharge to hopefully TCU.     Partial Seizure, previously controlled  Worsened chronic aphasia   Difficulty Swallowing  Presented with acute (< 1 day) worsening of aphasia and new facial twitching with abnormal mouth movements concerning for partial seizure.  She underwent stroke eval in the ED which was reassuring against acute CVA,   Given recent recurrence of glioblastoma, there was concern for spread or advance of disease, though both CT and MRI were reassuring to stability of current malignancy from last imaging (9/2023).  No metabolic derangements to explain acute change, and norm WBC with no focal findings was reassuring against acute infection.  Neurology consulted and felt movements were consistent with partial seizure, lacosamide was added, though subsequent EEG showed some degree  of persistent seizure activity, and further brief clinical seizures were observed.  Neurologist discussed risk/benefit of a possible third antiepileptic medication (which could carry a risk of heavier sedation), the family elected to remain on the two current medications for quality of life reasons.  - Neurology consulted, appreciate thoughtful input  - Continue levetiracetam 1250 mg BID   - Lacosamide was started with 200mg IV loading dose (complete) then 100mg IV BID              - transitioned to PO on 10/16              - Could increase to 150mg BID if increased seizure burden but stable on current dosing   - May reconsider third agent should seizure burden change drastically (ie Grand Mal, etc)   - Lorazepam PRN for any seizure activity > 2 minutes or increased clusters of seizure like activity   - Seizure precautions   - Neuro checks      H/o L frontoparietal glioblastoma s/p resection, chemotherapy and radiation (completed May 2021), with recurrence of malignancy 2023   Cognitive and functional impairment due to glioblastoma and chemoradiation   Seen by oncology on this visit, repeat imaging stable from September. Started on trial of steroid, eval symptom improvement. Patient has developed worsening difficulty with swallowing, now eating more. Reduced decadron to 2 mg IV BID from 4 mg BID on 10/17 however patient developed worsening headache on 10/19 so dose increased.  - Oncology consulted, appreciate input - signed off, s/p bevacizumab 10/18,  next due 11/1 (appointment made).  - Rad/Onc consulted, RT started on 10/12 for 10 fractions - finished on 10/25  - Dexamethasone 4mg IV BID  (10/9 - 10/25) - headache resolved and no new neuro symptoms. Start tapering as of 10/26 - 2 mg BID x 2 weeks (10/26-11/9), 2 mg daily x 2 weeks (11/10-11/23), then stop.  - TMP-SMX daily for PCP PPX (pt had hx of PJP)   - PT/OT consulted, appreciate input  Rec TCU vs LTC. Family and patient hopeful for TCU - per , she  was functional prior to admission and hopes to return to that level of functionality. I talked to [facility]CU admissions 10/25 but still declined. Declined by multiple TCUs. Awaiting placement. Also keep in mind she has bevacizumab infusion on 11/1 and will need to be at a facility that will allow her to go to this infusion appointment.  She is independent with feeding. SLP recommends upright position and awake/alert when eating.   -  brought in OT/PT notes from her senior care to show her functionality. We both talked to PT directly to see if this might change their assessment of her baseline level of function.     Likely aspiration pneumonitis  Coughing with medications, significant water suctioned.  Felt a little short of breath afterwords, which she felt improved with a duoneb.  On exam, she does have scattered bilateral wheezes present.   - brianna albuterol MDI q6H while awake   - DuoNeb QID PRN   - SLP consult completed, appreciate input. Soft diet level 6 with thin liquids.     Depression   - Continue PTA Buspirone and Fluoxetine      H/o DVT   - Continue PTA rivaroxaban      HTN   - Continue PTA Amlodipine      Hemorrhoids   -topical preparation H  -monitor           Diet: Snacks/Supplements Adult: Other; Send Berry Magic Cup with dinner tray and allow pt/RN to order prn also (Also likes orange if in stock. NO van or ora); With Meals  Regular Diet Adult Thin Liquids (level 0)    DVT Prophylaxis: DOAC  Martino Catheter: Not present  Lines: None     Cardiac Monitoring: None  Code Status: Full Code      Clinically Significant Risk Factors                  # Hypertension: Noted on problem list                   Disposition Plan             Drea Pickering MD (Sally)  Internal Medicine/Pediatrics  Hospitalist    Hospitalist Service, GOLD TEAM [address] [place] [address]  Securely message with be2 (more info)  Text page via [facility] Paging/Directory   See signed in provider for up to date  coverage information  ______________________________________________________________________    Interval History   No acute events overight. No headache, no neuro changes, no new weakness. Feeling well. Eating/feeding herself independently.     Physical Exam   Vital Signs: Temp: 98  F (36.7  C) Temp src: Oral BP: (!) 97/31 (notified RN) Pulse: 68   Resp: 17 SpO2: 96 % O2 Device: None (Room air)    Weight: 155 lbs 3.26 oz      General: awake, alert, in no acute distress  HEENT: NCAT, sclera anicteric, no nasal discharge, MMM  CV: RRR  Resp: CTAB, no increased WOB  Abd: Soft, nontender, nondistended, +BS, no rebound or guarding  MSK: Mild peripheral edema, extremities warm and well perfused  Skin: warm, dry, no jaundice  Neuro: R sided weakness and contracture chronic    Medical Decision Making       50 MINUTES SPENT BY ME on the date of service doing chart review, history, exam, documentation & further activities per the note.      Data         Imaging results reviewed over the past 24 hrs:   No results found for this or any previous visit (from the past 24 hour(s)).

## 2023-10-28 NOTE — PLAN OF CARE
Oriented x3, disoriented to time. Hypertensive to 143/80, OVSS on RA. Denies pain. Voids adequately, purewick in use. BM today, soft. Incontinent B/B. Blanchable redness to sacrum/buttock. Mepilex in place, barrier cream applied on surrounding areas. PIV in left forearm SL. Bed bath completed today. Good appetite. Repositioned Q2H or more frequently. Enjoyed visiting with  today.       Problem: Plan of Care - These are the overarching goals to be used throughout the patient stay.    Goal: Plan of Care Review  Description: The Plan of Care Review/Shift note should be completed every shift.  The Outcome Evaluation is a brief statement about your assessment that the patient is improving, declining, or no change.  This information will be displayed automatically on your shift note.  Outcome: Progressing     Problem: Seizure Disorder Comorbidity  Goal: Maintenance of Seizure Control  Outcome: Progressing  Intervention: Maintain Seizure-Symptom Control  Recent Flowsheet Documentation  Taken 10/28/2023 1600 by Yudy Adkins RN  Seizure Precautions:   activity supervised   clutter-free environment maintained   emergency equipment at bedside   side rails padded  Taken 10/28/2023 1200 by Yudy Adkins RN  Seizure Precautions:   activity supervised   clutter-free environment maintained   emergency equipment at bedside   side rails padded  Taken 10/28/2023 0800 by Yudy Adkins RN  Seizure Precautions:   activity supervised   clutter-free environment maintained   emergency equipment at bedside   side rails padded     Problem: Hypertension Comorbidity  Goal: Blood Pressure in Desired Range  Outcome: Progressing  Intervention: Maintain Blood Pressure Management  Recent Flowsheet Documentation  Taken 10/28/2023 1600 by Yudy Adkins RN  Medication Review/Management:   medications reviewed   high-risk medications identified  Taken 10/28/2023 1200 by Yudy Adkins RN  Medication Review/Management:   medications  reviewed   high-risk medications identified  Taken 10/28/2023 0800 by Yudy Adkins RN  Medication Review/Management:   medications reviewed   high-risk medications identified     Problem: Thought Process Alteration  Goal: Optimal Thought Clarity  Outcome: Progressing  Intervention: Minimize Safety Risk and Altered Thought  Recent Flowsheet Documentation  Taken 10/28/2023 1600 by Yudy Adkins RN  Enhanced Safety Measures: room near unit station  Taken 10/28/2023 1200 by Yudy Adkins RN  Enhanced Safety Measures: room near unit station  Taken 10/28/2023 0800 by Yudy Adkins RN  Sensory Stimulation Regulation:   care clustered   quiet environment promoted   auditory stimulation minimized   lighting decreased  Enhanced Safety Measures: room near unit station     Problem: Swallowing Impairment  Goal: Optimal Eating/Swallowing without Aspiration  Outcome: Progressing  Intervention: Optimize Eating and Swallowing  Recent Flowsheet Documentation  Taken 10/28/2023 1600 by Yudy Adkins RN  Aspiration Precautions:   awake/alert before oral intake   oral hygiene care promoted   distractions minimized during oral intake   liquids/solids alternated   respiratory status monitored   upright posture maintained  Taken 10/28/2023 1200 by Yudy Adkins RN  Aspiration Precautions:   awake/alert before oral intake   oral hygiene care promoted   distractions minimized during oral intake   liquids/solids alternated   respiratory status monitored   upright posture maintained  Taken 10/28/2023 0800 by Yudy Adkins RN  Aspiration Precautions:   awake/alert before oral intake   oral hygiene care promoted   distractions minimized during oral intake   liquids/solids alternated   respiratory status monitored   upright posture maintained  Feeding/Eating Techniques:   monitored for feeding stress cues   oral mucosa moistened   rest periods provided   close supervision provided     Problem: Communication Impairment  Goal: Effective  Communication Skills  Outcome: Progressing  Intervention: Optimize Communication Skills  Recent Flowsheet Documentation  Taken 10/28/2023 0800 by Yudy Adkins, RN  Communication Enhancement Strategies:   call light answered in person   extra time allowed for response   family involved in communication plan   healthcare instructions adapted   one-step directions provided   verbal and visual cues paired

## 2023-10-28 NOTE — PLAN OF CARE
"BP (!) 152/87 (BP Location: Right arm)   Pulse 66   Temp 98.4  F (36.9  C) (Axillary)   Resp 16   Ht 1.702 m (5' 7\")   Wt 70.4 kg (155 lb 3.3 oz)   SpO2 92%   BMI 24.31 kg/m    Pt A&O x3, disoriented to time. VSS on ra. Assist x2, turn/reposition. Sallin locked. Denies pain and SOB. Purwick in place, voiding adequately. Continue on POC.     Problem: Plan of Care - These are the overarching goals to be used throughout the patient stay.    Goal: Plan of Care Review  Description: The Plan of Care Review/Shift note should be completed every shift.  The Outcome Evaluation is a brief statement about your assessment that the patient is improving, declining, or no change.  This information will be displayed automatically on your shift note.  Outcome: Progressing   Goal Outcome Evaluation:                        "

## 2023-10-28 NOTE — PROGRESS NOTES
[facility]    Medicine Progress Note - Hospitalist Service, GOLD TEAM 10    Date of Admission:  10/6/2023    Assessment & Plan   Olga Bailey is a 78 year old female admitted on 10/6/2023. She has history of L frontoparietal glioblastoma s/p resection, chemotherapy and radiation with remission in 2021, recently found to have recurrence in September 2023, with baseline cognitive and functional impairment, h/o seizures, h/o DVT on xarelto, HTN, and depression who presented 10/6/23 with acute worsening of baseline aphasia as well as new facial twitching. Admitted to internal medicine for further work up and management. Neurology consulted and patient found to be having partial seizures for which lacosamide was added to keppra with improvement in seizure activity. Continued radiotherapy while on [facility] but has now transferred to [place] for concomitant bevacizumab. Tolerated bevacizumab well however developed worsening headaches and tiredness at reduced dose of steroids so increased back up to 4 mg BID per rad onc. Completed 10 days of radiation on 10/25/23. Medically ready to discharge to hopefully TCU.     Partial Seizure, previously controlled  Worsened chronic aphasia   Difficulty Swallowing  Presented with acute (< 1 day) worsening of aphasia and new facial twitching with abnormal mouth movements concerning for partial seizure.  She underwent stroke eval in the ED which was reassuring against acute CVA,   Given recent recurrence of glioblastoma, there was concern for spread or advance of disease, though both CT and MRI were reassuring to stability of current malignancy from last imaging (9/2023).  No metabolic derangements to explain acute change, and norm WBC with no focal findings was reassuring against acute infection.  Neurology consulted and felt movements were consistent with partial seizure, lacosamide was added, though subsequent EEG showed some degree  of persistent seizure activity, and further brief clinical seizures were observed.  Neurologist discussed risk/benefit of a possible third antiepileptic medication (which could carry a risk of heavier sedation), the family elected to remain on the two current medications for quality of life reasons.  - Neurology consulted, appreciate thoughtful input  - Continue levetiracetam 1250 mg BID   - Lacosamide was started with 200mg IV loading dose (complete) then 100mg IV BID              - transitioned to PO on 10/16              - Could increase to 150mg BID if increased seizure burden but stable on current dosing   - May reconsider third agent should seizure burden change drastically (ie Grand Mal, etc)   - Lorazepam PRN for any seizure activity > 2 minutes or increased clusters of seizure like activity   - Seizure precautions   - Neuro checks stopped, stable      H/o L frontoparietal glioblastoma s/p resection, chemotherapy and radiation (completed May 2021), with recurrence of malignancy 2023   Cognitive and functional impairment due to glioblastoma and chemoradiation   Seen by oncology on this visit, repeat imaging stable from September. Started on trial of steroid, eval symptom improvement. Patient has developed worsening difficulty with swallowing, now eating more. Reduced decadron to 2 mg IV BID from 4 mg BID on 10/17 however patient developed worsening headache on 10/19 so dose increased.  - Oncology consulted, appreciate input - signed off, s/p bevacizumab 10/18,  next due 11/1 (appointment made).  - Rad/Onc consulted, RT started on 10/12 for 10 fractions - finished on 10/25  - Dexamethasone 4mg IV BID  (10/9 - 10/25) - headache resolved and no new neuro symptoms. Start tapering as of 10/26 - 2 mg BID x 2 weeks (10/26-11/9), 2 mg daily x 2 weeks (11/10-11/23), then stop.  - TMP-SMX daily for PCP PPX (pt had hx of PJP)   - PT/OT consulted, appreciate input  Rec TCU vs LTC. Family and patient hopeful for TCU - per  , she was functional prior to admission and hopes to return to that level of functionality. Awaiting placement. Also keep in mind she has bevacizumab infusion on 11/1 and will need to be at a facility that will allow her to go to this infusion appointment. She is independent with feeding. SLP recommends upright position and awake/alert when eating.   Would benefit from discussion involving pt, family, medical team, and therapy team, and [facility] team to discuss proper placement for patient (TCU vs LTC vs correction given multiple TCUs have declined)     Likely aspiration pneumonitis  Coughing with medications, significant water suctioned.  Felt a little short of breath afterwords, which she felt improved with a duoneb.  On exam, she does have scattered bilateral wheezes present.   - brianna albuterol MDI q6H while awake   - DuoNeb QID PRN   - SLP consult completed, appreciate input. Soft diet level 6 with thin liquids.     Depression   - Continue PTA Buspirone and Fluoxetine      H/o DVT   - Continue PTA rivaroxaban      HTN   - Continue PTA Amlodipine      Hemorrhoids   -topical preparation H  -monitor             Diet: Snacks/Supplements Adult: Other; Send Berry Magic Cup with dinner tray and allow pt/RN to order prn also (Also likes orange if in stock. NO van or ora); With Meals  Regular Diet Adult Thin Liquids (level 0)    DVT Prophylaxis: DOAC  Martino Catheter: Not present  Lines: None     Cardiac Monitoring: None  Code Status: Full Code      Clinically Significant Risk Factors                  # Hypertension: Noted on problem list                   Disposition Plan           Drea Pickering MD (Sally)  Internal Medicine/Pediatrics  Hospitalist    Hospitalist Service, GOLD TEAM 10  [facility]  Securely message with [email or web address] (more info)  Text page via [facility] Paging/Directory   See signed in provider for up to date coverage  information  ______________________________________________________________________    Interval History   No acute events. Doing well. No headache, no neuro changes, eating ok. Wondering about tcu plans.     Physical Exam   Vital Signs: Temp: 98  F (36.7  C) Temp src: Oral BP: 132/67 Pulse: 60   Resp: 16 SpO2: 95 % O2 Device: None (Room air)    Weight: 155 lbs 3.26 oz       General: awake, alert, in no acute distress  HEENT: NCAT, sclera anicteric, no nasal discharge, MMM  CV: RRR  Resp: CTAB, no increased WOB  Abd: Soft, nontender, nondistended, +BS, no rebound or guarding  MSK: Mild peripheral edema, extremities warm and well perfused  Skin: warm, dry, no jaundice  Neuro: R sided weakness and contracture chronic    Medical Decision Making       40 MINUTES SPENT BY ME on the date of service doing chart review, history, exam, documentation & further activities per the note.      Data       Imaging results reviewed over the past 24 hrs:   No results found for this or any previous visit (from the past 24 hour(s)).

## 2023-10-28 NOTE — PROGRESS NOTES
"Care Management Follow Up    Length of Stay (days): 21    Expected Discharge Date: 10/26/2023     Concerns to be Addressed: discharge planning     Patient plan of care discussed at interdisciplinary rounds: Yes    Anticipated Discharge Disposition: Transitional Care, Long Term Care, Home Care, Assisted Living     Anticipated Discharge Services:    Anticipated Discharge DME:  (TBD)    Patient/family educated on Medicare website which has current facility and service quality ratings: yes  Education Provided on the Discharge Plan: Yes  Patient/Family in Agreement with the Plan: yes    Referrals Placed by CM/SW: Post Acute Facilities  Private pay costs discussed:  TBD    Additional Information:  SW is following pt for discharge planning.   Pt completed her last radiation treatment on 10/25/2023 and is medically ready for discharge.  OT and PT are recommending a TCU stay.  Per Dr. Drea Pickering  pt will not have additional radiation.  Per Dr Pickering, pt will have a chemo infusion on 11/1/2024 and every 2 weeks thereafter. Per chart review, pt now has 1 nursing progress note entry (made on 10/27/2023) indicating that pt was able to feed self.    [facility] spoke w/ Dr. Pickering regarding discharge planning for this patient. At this time, Pt has not been accepted to a TCU that she/family prefer. There is a question of [facility] TCU denying pt due to being a \"total feed assist\" which is incorrect information as it's documented and confirmed that Pt can feed herself with meal set up. SW attempted to call [facility] today and there was no wknd staff available - SW to reattempt on Monday.     [facility] spoke w/ Pt's  at length and he expressed lack of understanding/insight in to why  TCU is unable to accept Pt for admission ( TCU is top pref). He states Pt used an EZ stand and walked 40' several weeks ago and feels that Pt has significant rehab potential. He is optimistic with planned chemo that Pt's status will continue to " improve. SW discussed spouse's preference w/ [facility] Admissions today and asked for communication to occur directly w/ Pt's spouse as we continue to explore other facility options. SW asked Pt's spouse for additional TCU preferences and he requested that writer contact dtr [facility] for assistance in identifying additional TCUs. SW spoke w/ [place] [facility] via phone and she provided a brief background of Pt's condition these last few years. She shared that Pt's spouse and other children have been primarily taking lead on Pt's overall care this year. She states she was under the impression that Pt received great care at [facility] but that pt's spouse prefers elsewhere w [facility] is what led to relocation to [facility] where Pt is currently. She requests that Pt's spouse identify additional facilities as it's his and Pt's decision. SW inquired about [facility]'s ability to care for Pt's needs w/ spouse at home. Dtr encouraged care management staff to contact and involve [facility]'s Nurse Supervisor Alana to have them review and see how Pt's needs can be met at home. Unfortunately, Pt's dtr did not have nurse supervisor's direct phone number. SW provided update to primary team of the conversations with Pt's family members and the barriers to TCU discharge at this time.     MACARIO Torres, LICSW  5 A/B/C WKND      Social Work and Care Management Department     SEARCHABLE in [facility] - search SOCIAL WORK     [facility] (0800 - 1630) Saturday and Sunday     Units: 4A, 4C, & 4E Pager: [phone]     Units: 5A & 5C Pager: [phone]     Units: 6A & 6B   Pager: [phone]     Units: 6C & 6D Pager: [phone]     Units: 7A & 7B  Pager: [phone]     Units: 7C Pager: [phone]     Unit: [facility] ED Pager: [phone]      [facility] (4756-0931) Saturday and Sunday      Units: 5 Ortho, 5 Med/Surg & WB ED  Pager:[phone]     Units: 6 Med/Surg, 8A, & 10A ICU  Pager: [phone]       After hours (1630 -  0000) Saturday & Sunday; (8962-2435) Mon-Fri; (5578-4810) [facility] Recognized Holidays     Units: ALL  Pager: [phone]

## 2023-10-29 DIAGNOSIS — C71.9 GLIOBLASTOMA (H): Primary | ICD-10-CM

## 2023-10-29 PROCEDURE — 250N000012 HC RX MED GY IP 250 OP 636 PS 637: Performed by: STUDENT IN AN ORGANIZED HEALTH CARE EDUCATION/TRAINING PROGRAM

## 2023-10-29 PROCEDURE — 250N000013 HC RX MED GY IP 250 OP 250 PS 637: Performed by: STUDENT IN AN ORGANIZED HEALTH CARE EDUCATION/TRAINING PROGRAM

## 2023-10-29 PROCEDURE — 99232 SBSQ HOSP IP/OBS MODERATE 35: CPT | Performed by: STUDENT IN AN ORGANIZED HEALTH CARE EDUCATION/TRAINING PROGRAM

## 2023-10-29 PROCEDURE — 120N000005 HC R&B MS OVERFLOW UMMC

## 2023-10-29 PROCEDURE — 250N000013 HC RX MED GY IP 250 OP 250 PS 637: Performed by: INTERNAL MEDICINE

## 2023-10-29 RX ORDER — ALBUTEROL SULFATE 90 UG/1
1-2 AEROSOL, METERED RESPIRATORY (INHALATION)
Start: 2023-11-29

## 2023-10-29 RX ORDER — EPINEPHRINE 1 MG/ML
0.3 INJECTION, SOLUTION INTRAMUSCULAR; SUBCUTANEOUS EVERY 5 MIN PRN
Status: CANCELLED | OUTPATIENT
Start: 2023-11-15

## 2023-10-29 RX ORDER — ALBUTEROL SULFATE 0.83 MG/ML
2.5 SOLUTION RESPIRATORY (INHALATION)
OUTPATIENT
Start: 2023-11-29

## 2023-10-29 RX ORDER — HEPARIN SODIUM (PORCINE) LOCK FLUSH IV SOLN 100 UNIT/ML 100 UNIT/ML
5 SOLUTION INTRAVENOUS
Status: CANCELLED | OUTPATIENT
Start: 2023-11-01

## 2023-10-29 RX ORDER — HEPARIN SODIUM,PORCINE 10 UNIT/ML
5-20 VIAL (ML) INTRAVENOUS DAILY PRN
OUTPATIENT
Start: 2023-11-29

## 2023-10-29 RX ORDER — LORAZEPAM 2 MG/ML
0.5 INJECTION INTRAMUSCULAR EVERY 4 HOURS PRN
Status: CANCELLED | OUTPATIENT
Start: 2023-11-01

## 2023-10-29 RX ORDER — METHYLPREDNISOLONE SODIUM SUCCINATE 125 MG/2ML
125 INJECTION, POWDER, LYOPHILIZED, FOR SOLUTION INTRAMUSCULAR; INTRAVENOUS
Start: 2023-11-29

## 2023-10-29 RX ORDER — METHYLPREDNISOLONE SODIUM SUCCINATE 125 MG/2ML
125 INJECTION, POWDER, LYOPHILIZED, FOR SOLUTION INTRAMUSCULAR; INTRAVENOUS
Status: CANCELLED
Start: 2023-11-01

## 2023-10-29 RX ORDER — ALBUTEROL SULFATE 90 UG/1
1-2 AEROSOL, METERED RESPIRATORY (INHALATION)
Status: CANCELLED
Start: 2023-11-01

## 2023-10-29 RX ORDER — HEPARIN SODIUM (PORCINE) LOCK FLUSH IV SOLN 100 UNIT/ML 100 UNIT/ML
5 SOLUTION INTRAVENOUS
OUTPATIENT
Start: 2023-11-29

## 2023-10-29 RX ORDER — LORAZEPAM 2 MG/ML
0.5 INJECTION INTRAMUSCULAR EVERY 4 HOURS PRN
OUTPATIENT
Start: 2023-11-29

## 2023-10-29 RX ORDER — HEPARIN SODIUM (PORCINE) LOCK FLUSH IV SOLN 100 UNIT/ML 100 UNIT/ML
5 SOLUTION INTRAVENOUS
Status: CANCELLED | OUTPATIENT
Start: 2023-11-15

## 2023-10-29 RX ORDER — EPINEPHRINE 1 MG/ML
0.3 INJECTION, SOLUTION INTRAMUSCULAR; SUBCUTANEOUS EVERY 5 MIN PRN
Status: CANCELLED | OUTPATIENT
Start: 2023-11-01

## 2023-10-29 RX ORDER — DIPHENHYDRAMINE HYDROCHLORIDE 50 MG/ML
50 INJECTION INTRAMUSCULAR; INTRAVENOUS
Status: CANCELLED
Start: 2023-11-15

## 2023-10-29 RX ORDER — MEPERIDINE HYDROCHLORIDE 25 MG/ML
25 INJECTION INTRAMUSCULAR; INTRAVENOUS; SUBCUTANEOUS EVERY 30 MIN PRN
Status: CANCELLED | OUTPATIENT
Start: 2023-11-15

## 2023-10-29 RX ORDER — ALBUTEROL SULFATE 0.83 MG/ML
2.5 SOLUTION RESPIRATORY (INHALATION)
Status: CANCELLED | OUTPATIENT
Start: 2023-11-01

## 2023-10-29 RX ORDER — MEPERIDINE HYDROCHLORIDE 25 MG/ML
25 INJECTION INTRAMUSCULAR; INTRAVENOUS; SUBCUTANEOUS EVERY 30 MIN PRN
Status: CANCELLED | OUTPATIENT
Start: 2023-11-01

## 2023-10-29 RX ORDER — DIPHENHYDRAMINE HYDROCHLORIDE 50 MG/ML
50 INJECTION INTRAMUSCULAR; INTRAVENOUS
Start: 2023-11-29

## 2023-10-29 RX ORDER — EPINEPHRINE 1 MG/ML
0.3 INJECTION, SOLUTION INTRAMUSCULAR; SUBCUTANEOUS EVERY 5 MIN PRN
OUTPATIENT
Start: 2023-11-29

## 2023-10-29 RX ORDER — MEPERIDINE HYDROCHLORIDE 25 MG/ML
25 INJECTION INTRAMUSCULAR; INTRAVENOUS; SUBCUTANEOUS EVERY 30 MIN PRN
OUTPATIENT
Start: 2023-11-29

## 2023-10-29 RX ORDER — DIPHENHYDRAMINE HYDROCHLORIDE 50 MG/ML
50 INJECTION INTRAMUSCULAR; INTRAVENOUS
Status: CANCELLED
Start: 2023-11-01

## 2023-10-29 RX ORDER — HEPARIN SODIUM,PORCINE 10 UNIT/ML
5-20 VIAL (ML) INTRAVENOUS DAILY PRN
Status: CANCELLED | OUTPATIENT
Start: 2023-11-01

## 2023-10-29 RX ORDER — HEPARIN SODIUM,PORCINE 10 UNIT/ML
5-20 VIAL (ML) INTRAVENOUS DAILY PRN
Status: CANCELLED | OUTPATIENT
Start: 2023-11-15

## 2023-10-29 RX ORDER — LORAZEPAM 2 MG/ML
0.5 INJECTION INTRAMUSCULAR EVERY 4 HOURS PRN
Status: CANCELLED | OUTPATIENT
Start: 2023-11-15

## 2023-10-29 RX ORDER — METHYLPREDNISOLONE SODIUM SUCCINATE 125 MG/2ML
125 INJECTION, POWDER, LYOPHILIZED, FOR SOLUTION INTRAMUSCULAR; INTRAVENOUS
Status: CANCELLED
Start: 2023-11-15

## 2023-10-29 RX ORDER — ALBUTEROL SULFATE 90 UG/1
1-2 AEROSOL, METERED RESPIRATORY (INHALATION)
Status: CANCELLED
Start: 2023-11-15

## 2023-10-29 RX ORDER — ALBUTEROL SULFATE 0.83 MG/ML
2.5 SOLUTION RESPIRATORY (INHALATION)
Status: CANCELLED | OUTPATIENT
Start: 2023-11-15

## 2023-10-29 RX ADMIN — SULFAMETHOXAZOLE AND TRIMETHOPRIM 1 TABLET: 800; 160 TABLET ORAL at 09:10

## 2023-10-29 RX ADMIN — FLUOXETINE 40 MG: 20 CAPSULE ORAL at 09:10

## 2023-10-29 RX ADMIN — BUSPIRONE HYDROCHLORIDE 30 MG: 30 TABLET ORAL at 09:11

## 2023-10-29 RX ADMIN — RIVAROXABAN 20 MG: 10 TABLET, FILM COATED ORAL at 16:27

## 2023-10-29 RX ADMIN — LEVETIRACETAM 1250 MG: 750 TABLET, FILM COATED ORAL at 09:11

## 2023-10-29 RX ADMIN — BUSPIRONE HYDROCHLORIDE 30 MG: 30 TABLET ORAL at 20:11

## 2023-10-29 RX ADMIN — DEXAMETHASONE 2 MG: 2 TABLET ORAL at 20:11

## 2023-10-29 RX ADMIN — LEVETIRACETAM 1250 MG: 750 TABLET, FILM COATED ORAL at 20:11

## 2023-10-29 RX ADMIN — ALBUTEROL SULFATE 2 PUFF: 90 AEROSOL, METERED RESPIRATORY (INHALATION) at 14:13

## 2023-10-29 RX ADMIN — ALBUTEROL SULFATE 2 PUFF: 90 AEROSOL, METERED RESPIRATORY (INHALATION) at 09:12

## 2023-10-29 RX ADMIN — AMLODIPINE BESYLATE 5 MG: 5 TABLET ORAL at 09:10

## 2023-10-29 RX ADMIN — DEXAMETHASONE 2 MG: 2 TABLET ORAL at 09:11

## 2023-10-29 RX ADMIN — PANTOPRAZOLE SODIUM 40 MG: 40 TABLET, DELAYED RELEASE ORAL at 16:27

## 2023-10-29 RX ADMIN — LACOSAMIDE 100 MG: 50 TABLET, FILM COATED ORAL at 20:11

## 2023-10-29 RX ADMIN — THERA TABS 1 TABLET: TAB at 09:10

## 2023-10-29 RX ADMIN — PANTOPRAZOLE SODIUM 40 MG: 40 TABLET, DELAYED RELEASE ORAL at 09:11

## 2023-10-29 RX ADMIN — LACOSAMIDE 100 MG: 50 TABLET, FILM COATED ORAL at 09:10

## 2023-10-29 RX ADMIN — ALBUTEROL SULFATE 2 PUFF: 90 AEROSOL, METERED RESPIRATORY (INHALATION) at 20:16

## 2023-10-29 ASSESSMENT — ACTIVITIES OF DAILY LIVING (ADL)
ADLS_ACUITY_SCORE: 60
ADLS_ACUITY_SCORE: 62
ADLS_ACUITY_SCORE: 58
ADLS_ACUITY_SCORE: 62
ADLS_ACUITY_SCORE: 58
ADLS_ACUITY_SCORE: 58
ADLS_ACUITY_SCORE: 62
ADLS_ACUITY_SCORE: 58
ADLS_ACUITY_SCORE: 62
ADLS_ACUITY_SCORE: 62

## 2023-10-29 NOTE — PLAN OF CARE
A&OX4, hypertensive to 145/71, OVSS on RA. Good appetite. 3 soft BM's today, bowel meds held. Purewick in use. AUOP. Repo Q2H or more frequently, see flowsheets. Seizure precautions dc'd, pads removed. Good appetite. Enjoyed spending time with her  and resting between cares.     Problem: Plan of Care - These are the overarching goals to be used throughout the patient stay.    Goal: Plan of Care Review  Description: The Plan of Care Review/Shift note should be completed every shift.  The Outcome Evaluation is a brief statement about your assessment that the patient is improving, declining, or no change.  This information will be displayed automatically on your shift note.  Outcome: Progressing     Problem: Seizure Disorder Comorbidity  Goal: Maintenance of Seizure Control  Outcome: Progressing     Problem: Hypertension Comorbidity  Goal: Blood Pressure in Desired Range  Outcome: Progressing     Problem: Thought Process Alteration  Goal: Optimal Thought Clarity  Outcome: Progressing  Intervention: Minimize Safety Risk and Altered Thought  Recent Flowsheet Documentation  Taken 10/29/2023 1600 by Yudy Adkins RN  Enhanced Safety Measures: room near unit station  Taken 10/29/2023 1200 by Yudy Adkins RN  Enhanced Safety Measures: room near unit station  Taken 10/29/2023 0817 by Yudy Adkins RN  Sensory Stimulation Regulation: care clustered  Enhanced Safety Measures: room near unit station     Problem: Swallowing Impairment  Goal: Optimal Eating/Swallowing without Aspiration  Outcome: Progressing  Intervention: Optimize Eating and Swallowing  Recent Flowsheet Documentation  Taken 10/29/2023 0817 by Yudy Adkins RN  Feeding/Eating Techniques:   monitored for feeding stress cues   oral mucosa moistened   rest periods provided   close supervision provided     Problem: Communication Impairment  Goal: Effective Communication Skills  Outcome: Progressing  Intervention: Optimize Communication Skills  Recent  Flowsheet Documentation  Taken 10/29/2023 0817 by Yudy Adkins, RN  Communication Enhancement Strategies:   call light answered in person   extra time allowed for response   family involved in communication plan   one-step directions provided

## 2023-10-29 NOTE — PLAN OF CARE
Afebrile, VSS on RA. Denies pain, N/V/D and SOB. Repositioned q2h throughout the shift. Purewick in place and working well. Sacral mepilex in place for blanchable redness on coccyx. Patient resting comfortably between cares.     Problem: Plan of Care - These are the overarching goals to be used throughout the patient stay.    Goal: Plan of Care Review  Description: The Plan of Care Review/Shift note should be completed every shift.  The Outcome Evaluation is a brief statement about your assessment that the patient is improving, declining, or no change.  This information will be displayed automatically on your shift note.  Outcome: Progressing     Problem: Seizure Disorder Comorbidity  Goal: Maintenance of Seizure Control  Intervention: Maintain Seizure-Symptom Control  Recent Flowsheet Documentation  Taken 10/29/2023 0100 by Funmilayo Bangura RN  Seizure Precautions: activity supervised     Problem: Hypertension Comorbidity  Goal: Blood Pressure in Desired Range  Outcome: Progressing  Intervention: Maintain Blood Pressure Management  Recent Flowsheet Documentation  Taken 10/29/2023 0100 by Funmilayo Bangura RN  Medication Review/Management: medications reviewed   Goal Outcome Evaluation:

## 2023-10-29 NOTE — PROGRESS NOTES
[facility]    Medicine Progress Note - Hospitalist Service, GOLD TEAM 10    Date of Admission:  10/6/2023    Assessment & Plan   Olga Bailey is a 78 year old female admitted on 10/6/2023. She has history of L frontoparietal glioblastoma s/p resection, chemotherapy and radiation with remission in 2021, recently found to have recurrence in September 2023, with baseline cognitive and functional impairment, h/o seizures, h/o DVT on xarelto, HTN, and depression who presented 10/6/23 with acute worsening of baseline aphasia as well as new facial twitching. Admitted to internal medicine for further work up and management. Neurology consulted and patient found to be having partial seizures for which lacosamide was added to keppra with improvement in seizure activity. Continued radiotherapy while on [facility] but has now transferred to [place] for concomitant bevacizumab. Tolerated bevacizumab well however developed worsening headaches and tiredness at reduced dose of steroids so increased back up to 4 mg BID per rad onc. Completed 10 days of radiation on 10/25/23. Medically ready to discharge to hopefully TCU.     Partial Seizure, previously controlled  Worsened chronic aphasia   Difficulty Swallowing  Presented with acute (< 1 day) worsening of aphasia and new facial twitching with abnormal mouth movements concerning for partial seizure.  She underwent stroke eval in the ED which was reassuring against acute CVA,   Given recent recurrence of glioblastoma, there was concern for spread or advance of disease, though both CT and MRI were reassuring to stability of current malignancy from last imaging (9/2023).  No metabolic derangements to explain acute change, and norm WBC with no focal findings was reassuring against acute infection.  Neurology consulted and felt movements were consistent with partial seizure, lacosamide was added, though subsequent EEG showed some degree  of persistent seizure activity, and further brief clinical seizures were observed.  Neurologist discussed risk/benefit of a possible third antiepileptic medication (which could carry a risk of heavier sedation), the family elected to remain on the two current medications for quality of life reasons.  - Neurology consulted, appreciate thoughtful input  - Continue levetiracetam 1250 mg BID   - Lacosamide was started with 200mg IV loading dose (complete) then 100mg IV BID              - transitioned to PO on 10/16              - Could increase to 150mg BID if increased seizure burden but stable on current dosing   - May reconsider third agent should seizure burden change drastically (ie Grand Mal, etc)   - Lorazepam PRN for any seizure activity > 2 minutes or increased clusters of seizure like activity   - Neuro checks stopped, stable      H/o L frontoparietal glioblastoma s/p resection, chemotherapy and radiation (completed May 2021), with recurrence of malignancy 2023   Cognitive and functional impairment due to glioblastoma and chemoradiation   Seen by oncology on this visit, repeat imaging stable from September. Started on trial of steroid, eval symptom improvement. Patient has developed worsening difficulty with swallowing, now eating more. Reduced decadron to 2 mg IV BID from 4 mg BID on 10/17 however patient developed worsening headache on 10/19 so dose increased.  - Oncology consulted, appreciate input - signed off, s/p bevacizumab 10/18,  next due 11/1 (appointment made).  - Rad/Onc consulted, RT started on 10/12 for 10 fractions - finished on 10/25  - Dexamethasone 4mg IV BID  (10/9 - 10/25) - headache resolved and no new neuro symptoms. Start tapering as of 10/26 - 2 mg BID x 2 weeks (10/26-11/9), 2 mg daily x 2 weeks (11/10-11/23), then stop.  - TMP-SMX daily for PCP PPX (pt had hx of PJP)   - PT/OT consulted, appreciate input  Rec TCU vs LTC. Family and patient hopeful for TCU - per , she was  functional prior to admission and hopes to return to that level of functionality. Awaiting placement. Also keep in mind she has bevacizumab infusion on 11/1 and will need to be at a facility that will allow her to go to this infusion appointment. She is independent with feeding. SLP recommends upright position and awake/alert when eating.   Would benefit from discussion involving pt, family, medical team, and therapy team, and care home team to discuss proper placement for patient (TCU vs LTC vs care home given multiple TCUs have declined). Discussed with RNCC and will call care home on 10/30 to see what barriers are to discharge back there.     Likely aspiration pneumonitis  Coughing with medications, significant water suctioned.  Felt a little short of breath afterwords, which she felt improved with a duoneb.  On exam, she does have scattered bilateral wheezes present.   - brianna albuterol MDI q6H while awake   - DuoNeb QID PRN   - SLP consult completed, appreciate input. Soft diet level 6 with thin liquids.     Depression   - Continue PTA Buspirone and Fluoxetine      H/o DVT   - Continue PTA rivaroxaban      HTN   - Continue PTA Amlodipine      Hemorrhoids   -topical preparation H  -monitor             Diet: Snacks/Supplements Adult: Other; Send Berry Magic Cup with dinner tray and allow pt/RN to order prn also (Also likes orange if in stock. NO van or ora); With Meals  Regular Diet Adult Thin Liquids (level 0)    DVT Prophylaxis: DOAC  Martino Catheter: Not present  Lines: None     Cardiac Monitoring: None  Code Status: Full Code      Clinically Significant Risk Factors                  # Hypertension: Noted on problem list                   Disposition Plan           Drea Pickering MD (Sally)  Internal Medicine/Pediatrics  Hospitalist    Hospitalist Service, GOLD TEAM [address] [place] [address]  Securely message with [employer] (more info)  Text page via [facility] Paging/Directory   See signed in provider  for up to date coverage information  ______________________________________________________________________    Interval History   No acute events. Doing well, no headache, no new changes. Eating/drinking well. Is bored- wanting time to pass faster.     Physical Exam   Vital Signs: Temp: 98  F (36.7  C) Temp src: Oral BP: (!) 145/71 Pulse: 63   Resp: 18 SpO2: 97 % O2 Device: None (Room air)    Weight: 155 lbs 3.26 oz  General: awake, alert, in no acute distress  HEENT: NCAT, sclera anicteric, no nasal discharge, MMM  CV: RRR  Resp: CTAB, no increased WOB  Abd: Soft, nontender, nondistended, +BS, no rebound or guarding  MSK: Mild peripheral edema, extremities warm and well perfused  Skin: warm, dry, no jaundice  Neuro: R sided weakness and contracture chronic      Medical Decision Making       40 MINUTES SPENT BY ME on the date of service doing chart review, history, exam, documentation & further activities per the note.      Data   ------------------------- PAST 24 HR DATA REVIEWED -----------------------------------------------        Imaging results reviewed over the past 24 hrs:   No results found for this or any previous visit (from the past 24 hour(s)).

## 2023-10-30 ENCOUNTER — APPOINTMENT (OUTPATIENT)
Dept: PHYSICAL THERAPY | Facility: CLINIC | Age: 78
DRG: 054 | End: 2023-10-30
Attending: INTERNAL MEDICINE
Payer: MEDICARE

## 2023-10-30 PROCEDURE — 250N000013 HC RX MED GY IP 250 OP 250 PS 637: Performed by: STUDENT IN AN ORGANIZED HEALTH CARE EDUCATION/TRAINING PROGRAM

## 2023-10-30 PROCEDURE — 97530 THERAPEUTIC ACTIVITIES: CPT | Mod: GP | Performed by: PHYSICAL THERAPIST

## 2023-10-30 PROCEDURE — 250N000013 HC RX MED GY IP 250 OP 250 PS 637: Performed by: INTERNAL MEDICINE

## 2023-10-30 PROCEDURE — 99232 SBSQ HOSP IP/OBS MODERATE 35: CPT | Performed by: INTERNAL MEDICINE

## 2023-10-30 PROCEDURE — 250N000012 HC RX MED GY IP 250 OP 636 PS 637: Performed by: STUDENT IN AN ORGANIZED HEALTH CARE EDUCATION/TRAINING PROGRAM

## 2023-10-30 PROCEDURE — 120N000005 HC R&B MS OVERFLOW UMMC

## 2023-10-30 RX ORDER — POLYETHYLENE GLYCOL 3350 17 G/17G
17 POWDER, FOR SOLUTION ORAL 2 TIMES DAILY PRN
Status: DISCONTINUED | OUTPATIENT
Start: 2023-10-30 | End: 2023-11-28 | Stop reason: HOSPADM

## 2023-10-30 RX ORDER — AMOXICILLIN 250 MG
1 CAPSULE ORAL 2 TIMES DAILY PRN
Status: DISCONTINUED | OUTPATIENT
Start: 2023-10-30 | End: 2023-11-28 | Stop reason: HOSPADM

## 2023-10-30 RX ADMIN — PANTOPRAZOLE SODIUM 40 MG: 40 TABLET, DELAYED RELEASE ORAL at 08:55

## 2023-10-30 RX ADMIN — DEXAMETHASONE 2 MG: 2 TABLET ORAL at 08:56

## 2023-10-30 RX ADMIN — LACOSAMIDE 100 MG: 50 TABLET, FILM COATED ORAL at 08:55

## 2023-10-30 RX ADMIN — ALBUTEROL SULFATE 2 PUFF: 90 AEROSOL, METERED RESPIRATORY (INHALATION) at 12:59

## 2023-10-30 RX ADMIN — DEXAMETHASONE 2 MG: 2 TABLET ORAL at 20:11

## 2023-10-30 RX ADMIN — AMLODIPINE BESYLATE 5 MG: 5 TABLET ORAL at 08:56

## 2023-10-30 RX ADMIN — THERA TABS 1 TABLET: TAB at 08:55

## 2023-10-30 RX ADMIN — PANTOPRAZOLE SODIUM 40 MG: 40 TABLET, DELAYED RELEASE ORAL at 16:28

## 2023-10-30 RX ADMIN — BUSPIRONE HYDROCHLORIDE 30 MG: 30 TABLET ORAL at 20:11

## 2023-10-30 RX ADMIN — ALBUTEROL SULFATE 2 PUFF: 90 AEROSOL, METERED RESPIRATORY (INHALATION) at 08:57

## 2023-10-30 RX ADMIN — BUSPIRONE HYDROCHLORIDE 30 MG: 30 TABLET ORAL at 08:55

## 2023-10-30 RX ADMIN — FLUOXETINE 40 MG: 20 CAPSULE ORAL at 08:55

## 2023-10-30 RX ADMIN — LACOSAMIDE 100 MG: 50 TABLET, FILM COATED ORAL at 20:11

## 2023-10-30 RX ADMIN — SULFAMETHOXAZOLE AND TRIMETHOPRIM 1 TABLET: 800; 160 TABLET ORAL at 08:55

## 2023-10-30 RX ADMIN — LEVETIRACETAM 1250 MG: 750 TABLET, FILM COATED ORAL at 08:56

## 2023-10-30 RX ADMIN — ALBUTEROL SULFATE 2 PUFF: 90 AEROSOL, METERED RESPIRATORY (INHALATION) at 20:12

## 2023-10-30 RX ADMIN — LEVETIRACETAM 1250 MG: 750 TABLET, FILM COATED ORAL at 20:11

## 2023-10-30 RX ADMIN — RIVAROXABAN 20 MG: 10 TABLET, FILM COATED ORAL at 18:12

## 2023-10-30 ASSESSMENT — ACTIVITIES OF DAILY LIVING (ADL)
ADLS_ACUITY_SCORE: 63

## 2023-10-30 NOTE — PROGRESS NOTES
[facility]    Medicine Progress Note - Hospitalist Service, GOLD TEAM 10    Date of Admission:  10/6/2023    Assessment & Plan   Olga Bailey is a 78 year old female admitted on 10/6/2023. She has history of L frontoparietal glioblastoma s/p resection, chemotherapy and radiation with remission in 2021, recently found to have recurrence in September 2023, with baseline cognitive and functional impairment, h/o seizures, h/o DVT on xarelto, HTN, and depression who presented 10/6/23 with acute worsening of baseline aphasia as well as new facial twitching. Admitted to internal medicine for further work up and management. Neurology consulted and patient found to be having partial seizures for which lacosamide was added to keppra with improvement in seizure activity. Continued radiotherapy while on [facility] but has now transferred to [place] for concomitant bevacizumab. Tolerated bevacizumab well however developed worsening headaches and tiredness at reduced dose of steroids so increased back up to 4 mg BID per rad onc. Completed 10 days of radiation on 10/25/23. Medically ready to discharge to hopefully TCU.    Plan for today   - OT consult  - Ask PT to come in afternoons (too tired in AM)  - Change bowel regimen to PRN  - Medically ready for discharge from hospital      Partial Seizure, previously controlled  Worsened chronic aphasia   Difficulty Swallowing  Presented with acute (< 1 day) worsening of aphasia and new facial twitching with abnormal mouth movements concerning for partial seizure.  She underwent stroke eval in the ED which was reassuring against acute CVA,   Given recent recurrence of glioblastoma, there was concern for spread or advance of disease, though both CT and MRI were reassuring to stability of current malignancy from last imaging (9/2023).  No metabolic derangements to explain acute change, and norm WBC with no focal findings was reassuring  against acute infection.  Neurology consulted and felt movements were consistent with partial seizure, lacosamide was added, though subsequent EEG showed some degree of persistent seizure activity, and further brief clinical seizures were observed.  Neurologist discussed risk/benefit of a possible third antiepileptic medication (which could carry a risk of heavier sedation), the family elected to remain on the two current medications for quality of life reasons.  - Neurology followed patient   - Continue levetiracetam 1250 mg BID   - Lacosamide was started with 200mg IV loading dose (complete) then 100mg IV BID              - transitioned to PO on 10/16              - Could increase to 150mg BID if increased seizure burden but stable on current dosing   - May reconsider third agent should seizure burden change drastically (ie Grand Mal, etc)   - Lorazepam PRN for any seizure activity > 2 minutes or increased clusters of seizure like activity   - Neuro checks stopped, stable      H/o L frontoparietal glioblastoma s/p resection, chemotherapy and radiation (completed May 2021), with recurrence of malignancy 2023   Cognitive and functional impairment due to glioblastoma and chemoradiation   Seen by oncology on this visit, repeat imaging stable from September. Started on trial of steroid, eval symptom improvement. Patient has developed worsening difficulty with swallowing, now eating more. Reduced decadron to 2 mg IV BID from 4 mg BID on 10/17 however patient developed worsening headache on 10/19 so dose increased.  - Oncology consulted, appreciate input - signed off, s/p bevacizumab 10/18,  next due 11/1 (appointment made).  - Rad/Onc consulted, RT started on 10/12 for 10 fractions - finished on 10/25  - Dexamethasone 4mg IV BID  (10/9 - 10/25) - headache resolved and no new neuro symptoms. Start tapering as of 10/26 - 2 mg BID x 2 weeks (10/26-11/9), 2 mg daily x 2 weeks (11/10-11/23), then stop.  - TMP-SMX daily for  PCP PPX (pt had hx of PJP)   - PT/OT consulted, appreciate input  Rec TCU vs LTC. Family and patient hopeful for TCU - per , she was functional prior to admission and hopes to return to that level of functionality. Awaiting placement. Also keep in mind she has bevacizumab infusion on 11/1 and will need to be at a facility that will allow her to go to this infusion appointment. She is independent with feeding. SLP recommends upright position and awake/alert when eating.   Would benefit from discussion involving pt, family, medical team, and therapy team, and [facility] team to discuss proper placement for patient (TCU vs LTC vs [facility] given multiple TCUs have declined). RNCC will need to investigate if half-way actually declined her ( reported)     Likely aspiration pneumonitis  Coughing with medications, significant water suctioned.  Felt a little short of breath afterwords, which she felt improved with a duoneb.  On exam, she does have scattered bilateral wheezes present.   - brianna albuterol MDI q6H while awake   - DuoNeb QID PRN   - SLP consult completed, appreciate input. Soft diet level 6 with thin liquids.     Depression   - Continue PTA Buspirone and Fluoxetine      H/o DVT   - Continue PTA rivaroxaban      HTN   - Continue PTA Amlodipine      Hemorrhoids   -topical preparation H  -monitor             Diet: Snacks/Supplements Adult: Other; Send Berry Magic Cup with dinner tray and allow pt/RN to order prn also (Also likes orange if in stock. NO van or ora); With Meals  Regular Diet Adult Thin Liquids (level 0)    DVT Prophylaxis: DOAC  Martino Catheter: Not present  Lines: None     Cardiac Monitoring: None  Code Status: Full Code      Clinically Significant Risk Factors                  # Hypertension: Noted on problem list                   Disposition Plan      Expected Discharge Date: 10/30/2023      Destination: assisted living;nursing home  Discharge Comments: Done w/ radiation on 10/25 then patient would  prefer TCU to try and get stronger  awaiting TCU placement, medically ready  Has outpatitent radiation 11/1 and every two weeks after        Srikanth Henson MD  Hospitalist Service, GOLD TEAM 10  [facility]  Securely message with [employer] (more info)  Text page via [employer] Paging/Directory   See signed in provider for up to date coverage information  ______________________________________________________________________    Interval History   No acute events overnight, eating well, denies any new symptoms     Physical Exam   Vital Signs: Temp: 97.6  F (36.4  C) Temp src: Oral BP: (!) 146/75 Pulse: 61   Resp: 18 SpO2: 96 % O2 Device: None (Room air)    Weight: 155 lbs 3.26 oz    Constitutional: Awake, alert, cooperative, no apparent distress  Respiratory: Clear to auscultation bilaterally,  Cardiovascular: Regular rate and rhythm  GI: Normal bowel sounds, soft, non-distended, non-tender  Skin/Integumen: No rashes, no cyanosis        Medical Decision Making       40 MINUTES SPENT BY ME on the date of service doing chart review, history, exam, documentation & further activities per the note.      Data   ------------------------- PAST 24 HR DATA REVIEWED -----------------------------------------------        Imaging results reviewed over the past 24 hrs:   No results found for this or any previous visit (from the past 24 hour(s)).

## 2023-10-30 NOTE — PLAN OF CARE
Afebrile. VSS on RA. Denies pain, N/V overnight. X1 small smear of stool overnight, pt requesting to hold off on bowel meds last night and this morning due to multiple soft stools yesterday. Turned q2h. Mepilex on coccyx for blanchable redness. Patient not offering any complaints, resting between cares. Awaiting TCU placement.     Problem: Plan of Care - These are the overarching goals to be used throughout the patient stay.    Goal: Plan of Care Review  Description: The Plan of Care Review/Shift note should be completed every shift.  The Outcome Evaluation is a brief statement about your assessment that the patient is improving, declining, or no change.  This information will be displayed automatically on your shift note.  Outcome: Progressing     Problem: Seizure Disorder Comorbidity  Goal: Maintenance of Seizure Control  Outcome: Progressing   Goal Outcome Evaluation:

## 2023-10-30 NOTE — PROGRESS NOTES
Care Management Follow Up    Length of Stay (days): 23    Expected Discharge Date: 10/30/2023     Concerns to be Addressed: discharge planning     Patient plan of care discussed at interdisciplinary rounds: Yes    Anticipated Discharge Disposition: Transitional Care, Long Term Care, Home Care, Assisted Living     Anticipated Discharge Services:      [facility]  [address]  [place] [address] [phone]     [facility]  [address] [place] [address] [phone] (Alana RN)    Anticipated Discharge DME:  (TBD)    Patient/family educated on Medicare website which has current facility and service quality ratings: yes  Education Provided on the Discharge Plan: Yes  Patient/Family in Agreement with the Plan: yes    Referrals Placed by CM/SW: Post Acute Facilities  Private pay costs discussed: private room/amenity fees    10/30: [place] [facility]: SW resent the request.     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      Decisions regarding acceptance are pending from the following TCU's/SNF's:     - [facility]  left a message for Admissions to call in regards to acceptance.  Addendum:  CAMRON received a follow up call from Eliza in Admissions stating that they are not accepting any admit due to staffing.    Additional Information:  CAMRON spoke w/Nurse coordinator, Alana, at [facility]. CAMRON asked what level pt was at when she was there to help determine of pt could go back to [facility] with home OT/PT.   Per Alana: Pt was a 1 person assist to wheel chair and 1 person pivot and transfer to toilet. group home assisted pt 8x a day to toilet. Staff set up pt's food, tooth brush, grooming tools with a stand by assist. The group home has an order to use a Mayda sit to stand mechanical lift as needed. The group home used the lift later in the day when pt was tired, almost daily. Alana said that the nurses would sometimes walk with wheelchair behind pt while she walked w/walker down part of the zambrano (this was awhile ago)    CAMRON messaged PT, Ruth Madrigal, Ruth will work on the transferring using the sit to stand mechanical lift. She will also talk to pt about feeding herself. Pt is able to bring her left hand up to her mouth, but is right handed.     CAMRON messaged MD Henson asking for OT order to be placed. MD put in a  OT referral.      SW to follow and assist with any other discharge needs that may arise.  [facility] Peres   5A beds:5220-40  5C beds 5417-32 (no BMT pt's)     Phone: [phone]  Pager: [phone]

## 2023-10-30 NOTE — PLAN OF CARE
Neuro: A&Ox3, confused on time, R sided weakness and facial droop  Cardiac: VSS.   Respiratory: Sating  on RA  GI/: Adequate urine output with purewick. One smear BM  Diet/appetite: Tolerating Reg diet. Eating well.  Activity:  Assist of x2 with lift up to chair   Pain: At acceptable level on current regimen.   Skin: bruising   LDA's: PIV x1     Plan: Continue with POC. Notify primary team with changes.

## 2023-10-31 ENCOUNTER — APPOINTMENT (OUTPATIENT)
Dept: OCCUPATIONAL THERAPY | Facility: CLINIC | Age: 78
DRG: 054 | End: 2023-10-31
Attending: INTERNAL MEDICINE
Payer: MEDICARE

## 2023-10-31 LAB
ANION GAP SERPL CALCULATED.3IONS-SCNC: 8 MMOL/L (ref 7–15)
BUN SERPL-MCNC: 25 MG/DL (ref 8–23)
CALCIUM SERPL-MCNC: 9.5 MG/DL (ref 8.8–10.2)
CHLORIDE SERPL-SCNC: 103 MMOL/L (ref 98–107)
CREAT SERPL-MCNC: 0.71 MG/DL (ref 0.51–0.95)
DEPRECATED HCO3 PLAS-SCNC: 27 MMOL/L (ref 22–29)
EGFRCR SERPLBLD CKD-EPI 2021: 87 ML/MIN/1.73M2
ERYTHROCYTE [DISTWIDTH] IN BLOOD BY AUTOMATED COUNT: 13.4 % (ref 10–15)
GLUCOSE SERPL-MCNC: 96 MG/DL (ref 70–99)
HCT VFR BLD AUTO: 35.1 % (ref 35–47)
HGB BLD-MCNC: 11.1 G/DL (ref 11.7–15.7)
MCH RBC QN AUTO: 30.1 PG (ref 26.5–33)
MCHC RBC AUTO-ENTMCNC: 31.6 G/DL (ref 31.5–36.5)
MCV RBC AUTO: 95 FL (ref 78–100)
PLATELET # BLD AUTO: 171 10E3/UL (ref 150–450)
POTASSIUM SERPL-SCNC: 4.8 MMOL/L (ref 3.4–5.3)
RBC # BLD AUTO: 3.69 10E6/UL (ref 3.8–5.2)
SODIUM SERPL-SCNC: 138 MMOL/L (ref 135–145)
WBC # BLD AUTO: 6.7 10E3/UL (ref 4–11)

## 2023-10-31 PROCEDURE — 97165 OT EVAL LOW COMPLEX 30 MIN: CPT | Mod: GO

## 2023-10-31 PROCEDURE — 80048 BASIC METABOLIC PNL TOTAL CA: CPT | Performed by: INTERNAL MEDICINE

## 2023-10-31 PROCEDURE — 250N000013 HC RX MED GY IP 250 OP 250 PS 637: Performed by: STUDENT IN AN ORGANIZED HEALTH CARE EDUCATION/TRAINING PROGRAM

## 2023-10-31 PROCEDURE — 97530 THERAPEUTIC ACTIVITIES: CPT | Mod: GO

## 2023-10-31 PROCEDURE — 85027 COMPLETE CBC AUTOMATED: CPT | Performed by: INTERNAL MEDICINE

## 2023-10-31 PROCEDURE — 120N000005 HC R&B MS OVERFLOW UMMC

## 2023-10-31 PROCEDURE — 250N000013 HC RX MED GY IP 250 OP 250 PS 637: Performed by: INTERNAL MEDICINE

## 2023-10-31 PROCEDURE — 97535 SELF CARE MNGMENT TRAINING: CPT | Mod: GO

## 2023-10-31 PROCEDURE — 250N000012 HC RX MED GY IP 250 OP 636 PS 637: Performed by: STUDENT IN AN ORGANIZED HEALTH CARE EDUCATION/TRAINING PROGRAM

## 2023-10-31 PROCEDURE — 999N000147 HC STATISTIC PT IP EVAL DEFER: Performed by: PHYSICAL THERAPIST

## 2023-10-31 PROCEDURE — 99232 SBSQ HOSP IP/OBS MODERATE 35: CPT | Performed by: INTERNAL MEDICINE

## 2023-10-31 RX ADMIN — DEXAMETHASONE 2 MG: 2 TABLET ORAL at 08:55

## 2023-10-31 RX ADMIN — BUSPIRONE HYDROCHLORIDE 30 MG: 30 TABLET ORAL at 19:50

## 2023-10-31 RX ADMIN — LACOSAMIDE 100 MG: 50 TABLET, FILM COATED ORAL at 08:54

## 2023-10-31 RX ADMIN — LEVETIRACETAM 1250 MG: 750 TABLET, FILM COATED ORAL at 19:50

## 2023-10-31 RX ADMIN — DEXAMETHASONE 2 MG: 2 TABLET ORAL at 19:50

## 2023-10-31 RX ADMIN — ALBUTEROL SULFATE 2 PUFF: 90 AEROSOL, METERED RESPIRATORY (INHALATION) at 19:51

## 2023-10-31 RX ADMIN — AMLODIPINE BESYLATE 5 MG: 5 TABLET ORAL at 08:55

## 2023-10-31 RX ADMIN — FLUOXETINE 40 MG: 20 CAPSULE ORAL at 08:55

## 2023-10-31 RX ADMIN — PANTOPRAZOLE SODIUM 40 MG: 40 TABLET, DELAYED RELEASE ORAL at 17:36

## 2023-10-31 RX ADMIN — RIVAROXABAN 20 MG: 10 TABLET, FILM COATED ORAL at 17:36

## 2023-10-31 RX ADMIN — LACOSAMIDE 100 MG: 50 TABLET, FILM COATED ORAL at 19:50

## 2023-10-31 RX ADMIN — PANTOPRAZOLE SODIUM 40 MG: 40 TABLET, DELAYED RELEASE ORAL at 08:54

## 2023-10-31 RX ADMIN — BUSPIRONE HYDROCHLORIDE 30 MG: 30 TABLET ORAL at 08:54

## 2023-10-31 RX ADMIN — ALBUTEROL SULFATE 2 PUFF: 90 AEROSOL, METERED RESPIRATORY (INHALATION) at 08:59

## 2023-10-31 RX ADMIN — LEVETIRACETAM 1250 MG: 750 TABLET, FILM COATED ORAL at 08:54

## 2023-10-31 RX ADMIN — THERA TABS 1 TABLET: TAB at 08:55

## 2023-10-31 RX ADMIN — SULFAMETHOXAZOLE AND TRIMETHOPRIM 1 TABLET: 800; 160 TABLET ORAL at 08:54

## 2023-10-31 ASSESSMENT — ACTIVITIES OF DAILY LIVING (ADL)
ADLS_ACUITY_SCORE: 65
ADLS_ACUITY_SCORE: 65
ADLS_ACUITY_SCORE: 63
ADLS_ACUITY_SCORE: 65
ADLS_ACUITY_SCORE: 63
ADLS_ACUITY_SCORE: 65
ADLS_ACUITY_SCORE: 65
ADLS_ACUITY_SCORE: 63
ADLS_ACUITY_SCORE: 63
ADLS_ACUITY_SCORE: 65
ADLS_ACUITY_SCORE: 63
ADLS_ACUITY_SCORE: 65

## 2023-10-31 NOTE — PROGRESS NOTES
"Occupational Therapy Evaluation       10/31/23 1300   Appointment Info   Signing Clinician's Name / Credentials (OT) Melina Quinonez OTR/L   Living Environment   People in Home alone;facility resident  (pt lives in [facility] alone, however  is there during the day)   Current Living Arrangements assisted living   Home Accessibility no concerns   Transportation Anticipated health plan transportation   Living Environment Comments Per pt and spouse, pt lives in senior care, Ax1 at baseline.  there during the day. Has walk in shower, shower chair, grab bars.   Self-Care   Usual Activity Tolerance moderate   Current Activity Tolerance poor   Equipment Currently Used at Home grab bar, toilet;grab bar, tub/shower;hospital bed;shower chair;walker, rolling;wheelchair, manual   Fall history within last six months yes   Number of times patient has fallen within last six months 1   Activity/Exercise/Self-Care Comment Staff assist with all ADLs, Ax1 with easy stand for transfers. In June, pt was walking about 40' at a time with therapy, early October pt completing sinkside ADLs standing with OT and side stepping in hallway 10-15' increments   Instrumental Activities of Daily Living (IADL)   IADL Comments assist with all IADLs at baseline   General Information   Onset of Illness/Injury or Date of Surgery 10/06/23   Referring Physician Stephanie Baker MD   Patient/Family Therapy Goal Statement (OT) to get stronger and gain some more independence in ADL and mobility   Additional Occupational Profile Info/Pertinent History of Current Problem Per chart, \"Olga Bailey is a 78 year old female admitted on 10/6/2023. She has history of L frontoparietal glioblastoma s/p resection, chemotherapy and radiation with remission in 2021, recently found to have recurrence in September 2023, with baseline cognitive and functional impairment, h/o seizures, h/o DVT on xarelto, HTN, and depression who presented 10/6/23 with acute " "worsening of baseline aphasia as well as new facial twitching. Admitted to internal medicine for further work up and management. Neurology consulted and patient found to be having partial seizures for which lacosamide was added to keppra with improvement in seizure activity. Continued radiotherapy while on [facility] but has now transferred to [place] for concomitant bevacizumab. Tolerated bevacizumab well however developed worsening headaches and tiredness at reduced dose of steroids so increased back up to 4 mg BID per rad onc. Completed 10 days of radiation on 10/25/23. Medically ready to discharge to hopefully TCU.\"   Existing Precautions/Restrictions fall   Limitations/Impairments safety/cognitive   General Observations and Info Activity: up with assist   Cognitive Status Examination   Orientation Status person;place   Affect/Mental Status (Cognitive) flat/blunted affect   Follows Commands increased processing time needed;75-90% accuracy   Cognitive Status Comments Conversationally, pt able to recall aspects of past. Would benefit from formal cognitive evaluation. Pt oriented to self and place, not time. Increased processing time required for following commands, difficulty following multi step commands.   Visual Perception   Visual Impairment/Limitations corrective lenses full-time   Sensory   Sensory Comments Pt reported no numbness/tingling in BUE/BLE   Pain Assessment   Patient Currently in Pain No   Posture   Posture forward head position;protracted shoulders   Range of Motion Comprehensive   Comment, General Range of Motion RUE limited ROM at baseline   Strength Comprehensive (MMT)   Comment, General Manual Muscle Testing (MMT) Assessment RUE grossly 1/5 strength in hand. LUE generalized weakness   Muscle Tone Assessment   Muscle Tone Quick Adds RUE   Muscle Tone Assessment - RUE mildly decreased tone  (with tremors and potential spasticity)   Coordination   Coordination Comments Bilateral fine and gross " motor deficits in BUEs   Bed Mobility   Bed Mobility supine-sit;sit-supine   Supine-Sit Hoke (Bed Mobility) maximum assist (25% patient effort)   Sit-Supine [place] (Bed Mobility) maximum assist (25% patient effort)   Transfers   Transfers sit-stand transfer   Sit-Stand Transfer   Sit-Stand [place] (Transfers) maximum assist (25% patient effort)   Balance   Balance Comments Posterolateral lean toward left in sitting, max A standing balance. Leftward lean when supine   Activities of Daily Living   BADL Assessment/Intervention bathing;upper body dressing;lower body dressing;clothing fastener management;grooming;feeding;toileting   Bathing Assessment/Intervention   Position (Bathing) supported sitting   [place] Level (Bathing) maximum assist (25% patient effort)   Comment, (Bathing) Per clinical judgement   Upper Body Dressing Assessment/Training   [place] Level (Upper Body Dressing) maximum assist (25% patient effort)   Lower Body Dressing Assessment/Training   [place] Level (Lower Body Dressing) dependent (less than 25% patient effort)   Clothes Fastener Management   [place] Level (Clothes Fastener Management) maximum assist (25% patient effort)   Grooming Assessment/Training   [place] Level (Grooming) moderate assist (50% patient effort)   Eating/Self Feeding   Position (Feeding) supported sitting   [place] Level (Feeding) minimum assist (75% patient effort)   Toileting   [place] Level (Toileting) dependent (less than 25% patient effort)   Clinical Impression   Criteria for Skilled Therapeutic Interventions Met (OT) Yes, treatment indicated   OT Diagnosis decreased ADL independence   OT Problem List-Impairments impacting ADL problems related to;activity tolerance impaired;cognition;coordination;mobility;range of motion (ROM);strength;postural control   Assessment of Occupational Performance 5 or more Performance Deficits   Identified Performance Deficits ADL  including dressing, toileting, bathing, g/h, and functional mobility   Planned Therapy Interventions (OT) ADL retraining;cognition;bed mobility training;fine motor coordination training;motor coordination training;strengthening;transfer training;home program guidelines;progressive activity/exercise   Clinical Decision Making Complexity (OT) problem focused assessment/low complexity   Risk & Benefits of therapy have been explained evaluation/treatment results reviewed;care plan/treatment goals reviewed;risks/benefits reviewed;current/potential barriers reviewed;participants voiced agreement with care plan;participants included;patient;spouse/significant other   Clinical Impression Comments Pt presents below functional baseline, limited by weakness, deconditioning, and cognition impacting ADL participation and independence. Pt would benefit from continued OT to progress ADL participation, LUE strength and functional use for self feeding, and   OT Total Evaluation Time   OT Eval, Low Complexity Minutes (45411) 12   OT Goals   Therapy Frequency (OT) 4 times/week   OT Predicted Duration/Target Date for Goal Attainment 12/08/23   OT Goals Hygiene/Grooming;Lower Body Dressing;Upper Body Dressing;Upper Body Bathing;Lower Body Bathing;Bed Mobility;Transfers;Toilet Transfer/Toileting;Cognition   OT: Hygiene/Grooming moderate assist   OT: Upper Body Dressing Moderate assist   OT: Lower Body Dressing Maximum assist   OT: Upper Body Bathing Moderate assist   OT: Lower Body Bathing Maximum assist   OT: Bed Mobility Minimal assist   OT: Transfer Moderate assist   OT: Toilet Transfer/Toileting Moderate assist   OT: Cognitive Patient/caregiver will verbalize understanding of cognitive assessment results/recommendations as needed for safe discharge planning   Self-Care/Home Management   Self-Care/Home Mgmt/ADL, Compensatory, Meal Prep Minutes (16185) 39   Symptoms Noted During/After Treatment (Meal Preparation/Planning Training)  fatigue   Treatment Detail/Skilled Intervention OT: Following evaluation, treatment indicated. Facilitated ADL for increased IND. Pt completed bed mobility max A for trunk elevation and managing RLE. Pt required min A to roll side to side, total A for toileting and brad hygiene, however pt able to participate by holding bed rail with rolling. Completed UB dressing mod A and grooming/hygiene with max A due to LUE tremors and decreased coordination. Pt left supine with call light within reach, all needs met.   Therapeutic Activities   Therapeutic Activity Minutes (87346) 10   Symptoms noted during/after treatment fatigue   Treatment Detail/Skilled Intervention OT: Facilitated functional transfers for increased IND and functional strengthening for ADL. Pt required intermittent min A sitting EoB due to posterolateral lean, pt stated she does feel herself leaning but is unable to self correct. Completed STS with max A and cues for trunk extension. Pt returned to supine with max A, Ax2 to boost.   OT Discharge Planning   OT Plan OT: sitting balance EoB, STS with weight shifting, EoB ADLs, LUE strengthening and coordination for self feeding, trial weights for tremors, trial built up handles   OT Discharge Recommendation (DC Rec) Transitional Care Facility;Long term care facility   OT Rationale for DC Rec Pt presents below functional baseline, limited by weakness, deconditioning, and cognition impacting ADL participation and independence. Currently recommend TCU to progress ADL independence and overall strengthening and mobility as pt is below baseline and would benefit from continued therapy. If TCU cannot be found, recommend discharge to long term care with continued OT and PT.   OT Brief overview of current status max A bed mobility, max A STS   Total Session Time   Timed Code Treatment Minutes 49   Total Session Time (sum of timed and untimed services) 61

## 2023-10-31 NOTE — PROGRESS NOTES
CLINICAL NUTRITION SERVICES - REASSESSMENT NOTE     Nutrition Prescription    RECOMMENDATIONS FOR MDs/PROVIDERS TO ORDER:  None at this time     Malnutrition Status:    Patient does not meet two criteria     Recommendations already ordered by Registered Dietitian (RD):  None at this time     Future/Additional Recommendations:  Will continue to monitor appetite, intake and need for adjustment in snacks/supplements      EVALUATION OF THE PROGRESS TOWARD GOALS   Diet: Regular + thin liquids + magic cup   Intake: %        NEW FINDINGS   Medically ready for discharge from MD note 10/30  Olga and Fahad were in room. She reported she has a good appetite and eating is going well.     Weight Trends:   10/25/23 0849 70.4 kg (155 lb 3.3 oz) Bed scale   10/24/23 1433 71.1 kg (156 lb 12 oz) Bed scale   10/18/23 1311 73.2 kg (161 lb 6 oz) Bed scale     GI: No nausea noted. Last bowel movement, 10/30.  stools documented.   Skin: Gerardo 15, sacrum/coccyx      Labs:   Na 136, K+ 4.4, Mg 2.1, Po4 3.1 (WNL)  Glucose 107 (H)    Medications:   Decadron  Thera-Vit  Protonix    MALNUTRITION  % Intake: Decreased intake does not meet criteria  % Weight Loss: Weight loss does not meet criteria  Subcutaneous Fat Loss: None observed   Muscle Loss: Generalized mild   Fluid Accumulation/Edema: None noted  Malnutrition Diagnosis: Patient does not meet two of the established criteria necessary for diagnosing malnutrition but is at risk for malnutrition    Previous Goals   Patient to consume % of nutritionally adequate meal trays TID, or the equivalent with supplements/snacks.   Evaluation: Met    Previous Nutrition Diagnosis  Predicted inadequate protein intake related to increased metabolic demand 2/2 cancer and food choices as evidenced by if consuming 75% of meals, patient meeting 88% of protein needs    Evaluation: Improved     CURRENT NUTRITION DIAGNOSIS  No nutrition diagnosis at this time      INTERVENTIONS  Implementation  Discussed RD role in care- patient and  have no questions or concerns     Goals  Patient to consume % of nutritionally adequate meal trays TID, or the equivalent with supplements/snacks.    Monitoring/Evaluation  Progress toward goals will be monitored and evaluated per protocol.    Chaya Fontana RD, LD  5C/BMT pager: [phone]

## 2023-10-31 NOTE — PROGRESS NOTES
[facility]    Medicine Progress Note - Hospitalist Service, GOLD TEAM 10    Date of Admission:  10/6/2023    Assessment & Plan   Olga Bailey is a 78 year old female admitted on 10/6/2023. She has history of L frontoparietal glioblastoma s/p resection, chemotherapy and radiation with remission in 2021, recently found to have recurrence in September 2023, with baseline cognitive and functional impairment, h/o seizures, h/o DVT on xarelto, HTN, and depression who presented 10/6/23 with acute worsening of baseline aphasia as well as new facial twitching. Admitted to internal medicine for further work up and management. Neurology consulted and patient found to be having partial seizures for which lacosamide was added to keppra with improvement in seizure activity. Continued [facility] [place] [facility] but has now transferred to [place] for concomitant bevacizumab. Tolerated bevacizumab well however developed worsening headaches and tiredness at reduced dose of steroids so increased back up to 4 mg BID per rad onc. Completed 10 days of radiation on 10/25/23. Medically ready to discharge to hopefully TCU.    Today:  --Spoke with heme-onc regarding bevacizumab dose due tomorrow 11/1, they will talk as a team and reach out to nursing about whether it is okay to release order from therapy plan tomorrow  --RNCC/SW team working on safe discharge plan, greatly appreciate assistance     Partial Seizure, previously controlled  Worsened chronic aphasia   Difficulty Swallowing  Presented with acute (< 1 day) worsening of aphasia and new facial twitching with abnormal mouth movements concerning for partial seizure.  She underwent stroke eval in the ED which was reassuring against acute CVA,   Given recent recurrence of glioblastoma, there was concern for spread or advance of disease, though both CT and MRI were reassuring to stability of current malignancy from last imaging  (9/2023).  No metabolic derangements to explain acute change, and norm WBC with no focal findings was reassuring against acute infection.  Neurology consulted and felt movements were consistent with partial seizure, lacosamide was added, though subsequent EEG showed some degree of persistent seizure activity, and further brief clinical seizures were observed.  Neurologist discussed risk/benefit of a possible third antiepileptic medication (which could carry a risk of heavier sedation), the family elected to remain on the two current medications for quality of life reasons.  - Neurology followed patient   - Continue levetiracetam 1250 mg BID   - Lacosamide was started with 200mg IV loading dose (complete) then 100mg IV BID              - transitioned to PO on 10/16              - Could increase to 150mg BID if increased seizure burden but stable on current dosing   - May reconsider third agent should seizure burden change drastically (ie Grand Mal, etc)   - Lorazepam PRN for any seizure activity > 2 minutes or increased clusters of seizure like activity   - Neuro checks stopped, stable      H/o L frontoparietal glioblastoma s/p resection, chemotherapy and radiation (completed May 2021), with recurrence of malignancy 2023   Cognitive and functional impairment due to glioblastoma and chemoradiation   Seen by oncology on this visit, repeat imaging stable from September. Started on trial of steroid, eval symptom improvement. Patient has developed worsening difficulty with swallowing, now eating more. Reduced decadron to 2 mg IV BID from 4 mg BID on 10/17 however patient developed worsening headache on 10/19 so dose increased.  - Oncology consulted, appreciate input - signed off, s/p bevacizumab 10/18,  next due 11/1 (spoke with heme-onc per above)  - Rad/Onc consulted, RT started on 10/12 for 10 fractions - finished on 10/25  - Dexamethasone 4mg IV BID  (10/9 - 10/25) - headache resolved and no new neuro symptoms. Start  tapering as of 10/26 - 2 mg BID x 2 weeks (10/26-11/9), 2 mg daily x 2 weeks (11/10-11/23), then stop.  - TMP-SMX daily for PCP PPX (pt had hx of PJP)   - PT/OT consulted, appreciate input  Rec TCU vs LTC. Family and patient hopeful for TCU - per , she was functional prior to admission and hopes to return to that level of functionality. Awaiting placement. Also keep in mind she has bevacizumab infusion on 11/1 and will need to be at a facility that will allow her to go to this infusion appointment. She is independent with feeding. SLP recommends upright position and awake/alert when eating.   Would benefit from discussion involving pt, family, medical team, and therapy team, and [facility] team to discuss proper placement for patient (TCU vs LTC vs [facility] given multiple TCUs have declined). RNCC will need to investigate if [facility] ( reported)--> per RNCC today, [facility] did decline patient     Likely aspiration pneumonitis  Coughing with medications, significant water suctioned.  Felt a little short of breath afterwords, which she felt improved with a duoneb.  On exam, she does have scattered bilateral wheezes present.   - brianna albuterol MDI q6H while awake   - DuoNeb QID PRN   - SLP consult completed, appreciate input. Soft diet level 6 with thin liquids.     Depression   - Continue PTA Buspirone and Fluoxetine      H/o DVT   - Continue PTA rivaroxaban      HTN   - Continue PTA Amlodipine      Hemorrhoids   -topical preparation H  -monitor              Diet: Snacks/Supplements Adult: Other; Send Berry Magic Cup with dinner tray and allow pt/RN to order prn also (Also likes orange if in stock. NO van or ora); With Meals  Regular Diet Adult Thin Liquids (level 0)    DVT Prophylaxis: DOAC  Martino Catheter: Not present  Lines: None     Cardiac Monitoring: None  Code Status: Full Code      Clinically Significant Risk Factors                  # Hypertension: Noted on problem list                    Disposition Plan      Expected Discharge Date: 10/31/2023    Discharge Delays: Placement - TCU  Destination: assisted living;nursing home  Discharge Comments: Done w/ radiation on 10/25 then patient would prefer TCU to try and get stronger  awaiting TCU placement, medically ready  Has outpatitent radiation 11/1 and every two weeks after            Shelli Alejandro MD  Hospitalist Service, GOLD TEAM [address] [place] [address]  Securely message with [employer] (more info)  Text page via [facility] Paging/Directory   See signed in provider for up to date coverage information  ______________________________________________________________________    Interval History   No acute events overnight.  Patient seen today when lunch had arrived and she was eating with assistance from her  who is bedside and supportive.  Patient without acute complaint for me today including no report of pain, nausea, dyspnea.  She is enjoying her meal.  Patient and  agreeable to patient receiving bevacizumab tomorrow if heme-onc in agreement.    Physical Exam   Vital Signs: Temp: 97.6  F (36.4  C) Temp src: Axillary BP: 119/60 Pulse: 66   Resp: 18 SpO2: 96 % O2 Device: None (Room air)    Weight: 155 lbs 3.26 oz    Gen: NAD, sitting up comfortably in bed, pleasant and cooperative with interview and exam  HEENT: normocephalic, no scleral icterus, no perioral lesions, oral mucosa moist  CV: RRR at time of exam  Pulm: good air movement bilaterally without wheezing  Abd: soft, +bs, nontender to palpation diffusely, nondistended  LE: no edema bilaterally  Skin: no rash or jaundice on limited exam  Neuro: Appears to be AOx3 although patient has latency and answering questions and sometimes questions need to be repeated 1 or more times, making full orientation assessment to situation challenging, no tremor  Psych: mood-affect congruent although affect blunted      Medical Decision Making             Data      I have personally reviewed the following data over the past 24 hrs:    6.7  \   11.1 (L)   / 171     138 103 25.0 (H) /  96   4.8 27 0.71 \

## 2023-10-31 NOTE — PLAN OF CARE
5C Shift note 1900-0730    Neuro: A&Ox3 (confused about year). R sided weakness and facial droop present.  Cardiac: VSS. Trace edema generalized.   Respiratory: Sating >92% on RA.  GI/: Adequate urine output via purewick. Small BM X1 this shift. Incontinent of bowel and bladder.  Diet/appetite: Tolerating regular diet. Pt is a 1 to 1 feed, working with occupational therapy to improve this.  Activity:  Assist of 2 with lift. Q2H turns  Pain: denies  Skin: No new deficits noted. Mepilex on sacral area   LDA's: PIV x1       Takes pills whole w/ applesauce  Cares clustered to promote sleep    Plan: Waiting for TCU placement. Continue with POC. Notify primary team with changes.

## 2023-10-31 NOTE — PLAN OF CARE
"Goal Outcome Evaluation:    /86   Pulse 61   Temp 98.3  F (36.8  C) (Oral)   Resp 18   Ht 1.702 m (5' 7\")   Wt 70.4 kg (155 lb 3.3 oz)   SpO2 96%   BMI 24.31 kg/m      4302-1700    Neuro: Alert and oriented to self, place, situation but not to time.  Cardiac: SR. VSS.   Respiratory: Sating 96% on RA.  GI/: No bm. Adequate urine output via purewick.  Diet/appetite: Tolerating regular diet. Eating well.  Activity:  Assist of two with a lift up to chair.  Pain: At acceptable level on current regimen.   Skin: No new deficits noted.  LDA's:PIV SALINE LOCKED.  Plan: Pt is waiting for placement. Continue with POC. Notify primary team with changes.  "

## 2023-10-31 NOTE — PROGRESS NOTES
Care Management Follow Up    Length of Stay (days): 24    Expected Discharge Date: 10/31/2023     Concerns to be Addressed: discharge planning     Patient plan of care discussed at interdisciplinary rounds: Yes    Anticipated Discharge Disposition: Transitional Care, Long Term Care, Home Care, Assisted Living     Anticipated Discharge Services:    Anticipated Discharge DME:  (TBD)    Patient/family educated on Medicare website which has current facility and service quality ratings: yes  Education Provided on the Discharge Plan: Yes  Patient/Family in Agreement with the Plan: yes    Referrals Placed by CM/SW: Post Acute Facilities  Private pay costs discussed: private room/amenity fees       The following TCU's/SNF's have assessed and declined pt for admit:  - [facility], recommended return to assisted living, indicated that pt is not short term rehab appropriate, this SW spoke with Giana Akbar in Admissions (10/26/2023) who states that she spoke with the 5A provider yesterday was well as with Giana's Supervisor who also states she is not TCU appropriate  - [facility], assessed and declined as pt is a feeder and from a staffing perspective, they cannot provide this level of care  -10/31: [facility]: SW resent the request.  - Declined: they have to many acuity pt's and can't take pt on. They feel pt is more suited for a LTC facility  - [facility], assessed and declined for admit stating that pt's acuity level is to high and they cannot meet pt's needs  - [facility], is full with a lengthy wait list  - [facility], assessed and declined indicating that pt's acuity is to high and cannot meet psychosocial needs  - [facility], pt assessed and declined for admit as they cannot meet pt's needs, acuity is to high and they do not have an available room with a overhead lift.  - Fadumo Feliciano, CAMRON  left a message for Admissions to call in regards to  "acceptance.  Addendum:  SW received a follow up call from Eliza in Admissions stating that they are not accepting any admit due to staffing.        Additional Information:  SW stopped into pt's room to see how pt was doing. SW talked with pt and her , Fahad, about status of TCU referrals, PT/OT, and information that SW had gathered from pt's [facility] for returning back to [facility].   Pt's  shared his frustration that TCU's are declining pt. He brought in pt's PT notes for the last 3 months to show where pt's \"real\" baseline was at. He told SW she could make a copy of the notes. SW said she will ask for them if they are needed.     SW reached out to Boyd CHAN. She has reviewed the notes and has noted that pt's mobility and strength has increased with PT. SW asked MD about the chemo regimen. She said during rounds MD Delgadillo reported pt's chemo is low toxicity and shouldn't weaken pt or have severe side effects.     SW reached out to MD Delgadillo who stated that pt has had this  bevacizumab regimen before. It is not a traditional chemotherapy and shouldn't weaken her.     SW will meet with pt and pt's  about LTC and will explore discharge plans accordingly    SW to follow and assist with any other discharge needs that may arise.  DAYNA Peres   5A beds:5220-40  5C beds 9717-32 (no BMT pt's)     Phone: [phone]  Pager: [phone]      "

## 2023-10-31 NOTE — PROGRESS NOTES
[facility] PHYSICIANS  SPECIALIZING IN BREAKTHROUGHS  Radiation Oncology    On Treatment Visit Note      Olga Bailey      Date: Oct 19, 2023   MRN: [id number]   : 1945       Reason for Visit:  On Radiation Treatment Visit     Treatment Summary to Date   LtFrontReTx   2100 cGy 6/10   Total dose 3500 Gy      Subjective:   Olga is feeling well today. She arrived from in upstairs and is generally doing well.     Objective:   There were no vitals taken for this visit.  Gen: Appears well, in no acute distress  Skin: No erythema    Labs:  CBC RESULTS: Recent Labs   Lab Test 10/14/23  0731   WBC 8.8   RBC 3.22*   HGB 9.6*   HCT 29.5*   MCV 92   MCH 29.8   MCHC 32.5   RDW 13.1        ELECTROLYTES:  Recent Labs   Lab Test 10/14/23  0731      POTASSIUM 3.8   CHLORIDE 104   ESTIVEN 9.2   CO2 25   BUN 17.1   CR 0.79   *       Assessment:    Tolerating radiation therapy well.  All questions and concerns addressed.    Toxicities:  None    Plan:   1. Continue current therapy.  Can continue as outpt when discharged.   2. Follow up by video visit in 4 weeks       Mosaiq chart and setup information reviewed  MVCT reviewed      Betsy Spann MD, PhD     Department of Radiation Oncology  [facility]

## 2023-11-01 ENCOUNTER — APPOINTMENT (OUTPATIENT)
Dept: PHYSICAL THERAPY | Facility: CLINIC | Age: 78
DRG: 054 | End: 2023-11-01
Attending: INTERNAL MEDICINE
Payer: MEDICARE

## 2023-11-01 ENCOUNTER — APPOINTMENT (OUTPATIENT)
Dept: OCCUPATIONAL THERAPY | Facility: CLINIC | Age: 78
DRG: 054 | End: 2023-11-01
Attending: INTERNAL MEDICINE
Payer: MEDICARE

## 2023-11-01 PROCEDURE — 250N000013 HC RX MED GY IP 250 OP 250 PS 637: Performed by: STUDENT IN AN ORGANIZED HEALTH CARE EDUCATION/TRAINING PROGRAM

## 2023-11-01 PROCEDURE — 999N000128 HC STATISTIC PERIPHERAL IV START W/O US GUIDANCE

## 2023-11-01 PROCEDURE — 99232 SBSQ HOSP IP/OBS MODERATE 35: CPT | Mod: GC | Performed by: STUDENT IN AN ORGANIZED HEALTH CARE EDUCATION/TRAINING PROGRAM

## 2023-11-01 PROCEDURE — 97530 THERAPEUTIC ACTIVITIES: CPT | Mod: GP | Performed by: REHABILITATION PRACTITIONER

## 2023-11-01 PROCEDURE — 250N000011 HC RX IP 250 OP 636: Mod: JZ | Performed by: PSYCHIATRY & NEUROLOGY

## 2023-11-01 PROCEDURE — 120N000005 HC R&B MS OVERFLOW UMMC

## 2023-11-01 PROCEDURE — 97530 THERAPEUTIC ACTIVITIES: CPT | Mod: GO

## 2023-11-01 PROCEDURE — 250N000013 HC RX MED GY IP 250 OP 250 PS 637: Performed by: INTERNAL MEDICINE

## 2023-11-01 PROCEDURE — 99232 SBSQ HOSP IP/OBS MODERATE 35: CPT | Performed by: INTERNAL MEDICINE

## 2023-11-01 PROCEDURE — 97110 THERAPEUTIC EXERCISES: CPT | Mod: GP | Performed by: REHABILITATION PRACTITIONER

## 2023-11-01 PROCEDURE — 258N000003 HC RX IP 258 OP 636: Performed by: PSYCHIATRY & NEUROLOGY

## 2023-11-01 PROCEDURE — 97112 NEUROMUSCULAR REEDUCATION: CPT | Mod: GO

## 2023-11-01 PROCEDURE — 250N000012 HC RX MED GY IP 250 OP 636 PS 637: Performed by: STUDENT IN AN ORGANIZED HEALTH CARE EDUCATION/TRAINING PROGRAM

## 2023-11-01 RX ORDER — HEPARIN SODIUM (PORCINE) LOCK FLUSH IV SOLN 100 UNIT/ML 100 UNIT/ML
5 SOLUTION INTRAVENOUS
Status: DISCONTINUED | OUTPATIENT
Start: 2023-11-01 | End: 2023-11-01

## 2023-11-01 RX ORDER — ALBUTEROL SULFATE 90 UG/1
1-2 AEROSOL, METERED RESPIRATORY (INHALATION)
Status: ACTIVE | OUTPATIENT
Start: 2023-11-01 | End: 2023-11-02

## 2023-11-01 RX ORDER — DIPHENHYDRAMINE HYDROCHLORIDE 50 MG/ML
50 INJECTION INTRAMUSCULAR; INTRAVENOUS
Status: ACTIVE | OUTPATIENT
Start: 2023-11-01 | End: 2023-11-02

## 2023-11-01 RX ORDER — LORAZEPAM 2 MG/ML
0.5 INJECTION INTRAMUSCULAR EVERY 4 HOURS PRN
Status: DISCONTINUED | OUTPATIENT
Start: 2023-11-01 | End: 2023-11-01

## 2023-11-01 RX ORDER — ALBUTEROL SULFATE 0.83 MG/ML
2.5 SOLUTION RESPIRATORY (INHALATION)
Status: ACTIVE | OUTPATIENT
Start: 2023-11-01 | End: 2023-11-02

## 2023-11-01 RX ORDER — METHYLPREDNISOLONE SODIUM SUCCINATE 125 MG/2ML
125 INJECTION, POWDER, LYOPHILIZED, FOR SOLUTION INTRAMUSCULAR; INTRAVENOUS
Status: ACTIVE | OUTPATIENT
Start: 2023-11-01 | End: 2023-11-02

## 2023-11-01 RX ORDER — HEPARIN SODIUM,PORCINE 10 UNIT/ML
5-20 VIAL (ML) INTRAVENOUS DAILY PRN
Status: DISCONTINUED | OUTPATIENT
Start: 2023-11-01 | End: 2023-11-01

## 2023-11-01 RX ORDER — MEPERIDINE HYDROCHLORIDE 25 MG/ML
25 INJECTION INTRAMUSCULAR; INTRAVENOUS; SUBCUTANEOUS EVERY 30 MIN PRN
Status: ACTIVE | OUTPATIENT
Start: 2023-11-01 | End: 2023-11-01

## 2023-11-01 RX ORDER — EPINEPHRINE 1 MG/ML
0.3 INJECTION, SOLUTION, CONCENTRATE INTRAVENOUS EVERY 5 MIN PRN
Status: ACTIVE | OUTPATIENT
Start: 2023-11-01 | End: 2023-11-02

## 2023-11-01 RX ADMIN — THERA TABS 1 TABLET: TAB at 09:02

## 2023-11-01 RX ADMIN — AMLODIPINE BESYLATE 5 MG: 5 TABLET ORAL at 09:02

## 2023-11-01 RX ADMIN — ALBUTEROL SULFATE 2 PUFF: 90 AEROSOL, METERED RESPIRATORY (INHALATION) at 14:45

## 2023-11-01 RX ADMIN — LEVETIRACETAM 1250 MG: 750 TABLET, FILM COATED ORAL at 19:37

## 2023-11-01 RX ADMIN — SULFAMETHOXAZOLE AND TRIMETHOPRIM 1 TABLET: 800; 160 TABLET ORAL at 09:02

## 2023-11-01 RX ADMIN — SODIUM CHLORIDE 700 MG: 9 INJECTION, SOLUTION INTRAVENOUS at 17:03

## 2023-11-01 RX ADMIN — BUSPIRONE HYDROCHLORIDE 30 MG: 30 TABLET ORAL at 19:37

## 2023-11-01 RX ADMIN — RIVAROXABAN 20 MG: 10 TABLET, FILM COATED ORAL at 17:03

## 2023-11-01 RX ADMIN — BUSPIRONE HYDROCHLORIDE 30 MG: 30 TABLET ORAL at 09:02

## 2023-11-01 RX ADMIN — LACOSAMIDE 100 MG: 50 TABLET, FILM COATED ORAL at 09:13

## 2023-11-01 RX ADMIN — ALBUTEROL SULFATE 2 PUFF: 90 AEROSOL, METERED RESPIRATORY (INHALATION) at 09:08

## 2023-11-01 RX ADMIN — FLUOXETINE 40 MG: 20 CAPSULE ORAL at 09:02

## 2023-11-01 RX ADMIN — ALBUTEROL SULFATE 2 PUFF: 90 AEROSOL, METERED RESPIRATORY (INHALATION) at 02:31

## 2023-11-01 RX ADMIN — LACOSAMIDE 100 MG: 50 TABLET, FILM COATED ORAL at 19:38

## 2023-11-01 RX ADMIN — DEXAMETHASONE 2 MG: 2 TABLET ORAL at 19:37

## 2023-11-01 RX ADMIN — PANTOPRAZOLE SODIUM 40 MG: 40 TABLET, DELAYED RELEASE ORAL at 17:03

## 2023-11-01 RX ADMIN — DEXAMETHASONE 2 MG: 2 TABLET ORAL at 09:03

## 2023-11-01 RX ADMIN — ALBUTEROL SULFATE 2 PUFF: 90 AEROSOL, METERED RESPIRATORY (INHALATION) at 19:38

## 2023-11-01 RX ADMIN — PANTOPRAZOLE SODIUM 40 MG: 40 TABLET, DELAYED RELEASE ORAL at 09:02

## 2023-11-01 RX ADMIN — LEVETIRACETAM 1250 MG: 750 TABLET, FILM COATED ORAL at 09:02

## 2023-11-01 ASSESSMENT — ACTIVITIES OF DAILY LIVING (ADL)
ADLS_ACUITY_SCORE: 65
ADLS_ACUITY_SCORE: 58
ADLS_ACUITY_SCORE: 65

## 2023-11-01 NOTE — PROGRESS NOTES
Care Management Follow Up    Length of Stay (days): 25    Expected Discharge Date: 11/01/2023     Concerns to be Addressed: discharge planning     Patient plan of care discussed at interdisciplinary rounds: Yes    Anticipated Discharge Disposition: Transitional Care, Long Term Care, Home Care, Assisted Living     Anticipated Discharge Services: TBD   Anticipated Discharge DME:  (TBD)    Patient/family educated on Medicare website which has current facility and service quality ratings: yes  Education Provided on the Discharge Plan: Yes  Patient/Family in Agreement with the Plan: yes    Referrals Placed by CM/SW: Post Acute Facilities  Private pay costs discussed: private room/amenity fees    Additional Information:     SW met with pt and her  at pt's bedside. SW talked about the denials from TCU's due to pt being max assist and sometimes needing feeding. SW talked about LTC placement with PT/OT.  Pt's  explained that his wife has been to 3 different LTC facilities and that all the experiences were horrible. She had orders for PT/OT while in the LTC but didn't receive it while there and pt's deconditioning became worse. Due to these experiences pt and  prefer not to go to a LTC.     MD singleton called SW and let her know that she has put in a consult to Physical Medical and rehabilitation (PM&R) to assess pt's rehab ability.     CAMRON reached out to her supervisor, Isa,for assistance. Isa suggested asking [facility] to review pt again, since pt has been making progress in PT while in the hospital. Pt's new therapy regimen should not causes severe side effects  SW will ask -Tcu to access pt again after PM&R consult    CAMRON to follow and assist with any other discharge needs that may arise.  DAYNA Peres   5A beds:5220-40  5C beds 5417-32 (no BMT pt's)     Phone: [phone]  Pager: [phone]

## 2023-11-01 NOTE — PLAN OF CARE
"/74 (BP Location: Right arm)   Pulse 66   Temp 98.3  F (36.8  C) (Oral)   Resp 16   Ht 1.702 m (5' 7\")   Wt 69.6 kg (153 lb 7 oz)   SpO2 95%   BMI 24.03 kg/m      Soft bps, OVSS on RA. Disoriented to time and place. Up with lift device. Turned q2 hrs, next due at 2050. Incontinent of urine and stool x1. Purewick replaced. Received bevacizumab this evening and tolerated it well. Waiting for TCU or other placement. Continue with POC    Problem: Plan of Care - These are the overarching goals to be used throughout the patient stay.    Goal: Plan of Care Review  Description: The Plan of Care Review/Shift note should be completed every shift.  The Outcome Evaluation is a brief statement about your assessment that the patient is improving, declining, or no change.  This information will be displayed automatically on your shift note.  Outcome: Progressing     Problem: Seizure Disorder Comorbidity  Goal: Maintenance of Seizure Control  Outcome: Progressing     Problem: Hypertension Comorbidity  Goal: Blood Pressure in Desired Range  Outcome: Progressing  Intervention: Maintain Blood Pressure Management  Recent Flowsheet Documentation  Taken 11/1/2023 1240 by Capo Mcguire, RN  Medication Review/Management: medications reviewed  Taken 11/1/2023 0902 by Capo Mcguire, RN  Medication Review/Management: medications reviewed     Problem: Thought Process Alteration  Goal: Optimal Thought Clarity  Outcome: Progressing     Problem: Swallowing Impairment  Goal: Optimal Eating/Swallowing without Aspiration  Outcome: Progressing  Intervention: Optimize Eating and Swallowing  Recent Flowsheet Documentation  Taken 11/1/2023 0902 by Capo Mcguire, RN  Aspiration Precautions: awake/alert before oral intake     Problem: Communication Impairment  Goal: Effective Communication Skills  Outcome: Progressing     "
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
## 2023-11-01 NOTE — PROGRESS NOTES
[facility]    Medicine Progress Note - Hospitalist Service, GOLD TEAM 10    Date of Admission:  10/6/2023    Assessment & Plan   Olga Bailey is a 78 year old female admitted on 10/6/2023. She has history of L frontoparietal glioblastoma s/p resection, chemotherapy and radiation with remission in 2021, recently found to have recurrence in September 2023, with baseline cognitive and functional impairment, h/o seizures, h/o DVT on xarelto, HTN, and depression who presented 10/6/23 with acute worsening of baseline aphasia as well as new facial twitching. Admitted to internal medicine for further work up and management. Neurology consulted and patient found to be having partial seizures for which lacosamide was added to keppra with improvement in seizure activity. Continued radiotherapy while on [facility] but has now transferred to [place] for concomitant bevacizumab. Tolerated bevacizumab well however developed worsening headaches and tiredness at reduced dose of steroids so increased back up to 4 mg BID per rad onc. Completed 10 days of radiation on 10/25/23. Medically ready to discharge to hopefully TCU.    Today:  --Spoke with heme-onc regarding bevacizumab dose due today 11/1, they agree with nursing releasing order from therapy plan and administering today  --RNCC/SW team working on safe discharge plan, greatly appreciate assistance  --PM&R consult today to assist with evaluation for appropriate dispo and assess rehab potential, greatly appreciate recommendations     Partial Seizure, previously controlled  Worsened chronic aphasia   Difficulty Swallowing  Presented with acute (< 1 day) worsening of aphasia and new facial twitching with abnormal mouth movements concerning for partial seizure.  She underwent stroke eval in the ED which was reassuring against acute CVA,   Given recent recurrence of glioblastoma, there was concern for spread or advance of disease,  though both CT and MRI were reassuring to stability of current malignancy from last imaging (9/2023).  No metabolic derangements to explain acute change, and norm WBC with no focal findings was reassuring against acute infection.  Neurology consulted and felt movements were consistent with partial seizure, lacosamide was added, though subsequent EEG showed some degree of persistent seizure activity, and further brief clinical seizures were observed.  Neurologist discussed risk/benefit of a possible third antiepileptic medication (which could carry a risk of heavier sedation), the family elected to remain on the two current medications for quality of life reasons.  - Neurology followed patient   - Continue levetiracetam 1250 mg BID   - Lacosamide was started with 200mg IV loading dose (complete) then 100mg IV BID              - transitioned to PO on 10/16              - Could increase to 150mg BID if increased seizure burden but stable on current dosing   - May reconsider third agent should seizure burden change drastically (ie Grand Mal, etc)   - Lorazepam PRN for any seizure activity > 2 minutes or increased clusters of seizure like activity   - Neuro checks stopped, stable      H/o L frontoparietal glioblastoma s/p resection, chemotherapy and radiation (completed May 2021), with recurrence of malignancy 2023   Cognitive and functional impairment due to glioblastoma and chemoradiation   Seen by oncology on this visit, repeat imaging stable from September. Started on trial of steroid, eval symptom improvement. Patient has developed worsening difficulty with swallowing, now eating more. Reduced decadron to 2 mg IV BID from 4 mg BID on 10/17 however patient developed worsening headache on 10/19 so dose increased.  - Oncology consulted, appreciate input - signed off, s/p bevacizumab 10/18, most recent dose given 11/1 (spoke with heme-onc Dr. Babcock for approval); due again 11/15  - Rad/Onc consulted, RT started on  10/12 for 10 fractions - finished on 10/25  - Dexamethasone 4mg IV BID  (10/9 - 10/25) - headache resolved and no new neuro symptoms. Start tapering as of 10/26 - 2 mg BID x 2 weeks (10/26-11/9), 2 mg daily x 2 weeks (11/10-11/23), then stop.  - TMP-SMX daily for PCP PPX (pt had hx of PJP)   - PT/OT consulted, appreciate input  Rec TCU vs LTC. Family and patient hopeful for TCU - per , she was functional prior to admission and hopes to return to that level of functionality. Awaiting placement. Also keep in mind she has bevacizumab infusion on 11/1 and will need to be at a facility that will allow her to go to this infusion appointment. She is independent with feeding. SLP recommends upright position and awake/alert when eating.      Likely aspiration pneumonitis: resolved  Coughing with medications, significant water suctioned.  Felt a little short of breath afterwords, which she felt improved with a duoneb.  On exam, she does have scattered bilateral wheezes present.   - brianna albuterol MDI q6H while awake   - DuoNeb QID PRN   - SLP consult completed, appreciate input. Soft diet level 6 with thin liquids.     Depression   - Continue PTA Buspirone and Fluoxetine      H/o DVT   - Continue PTA rivaroxaban      HTN   - Continue PTA Amlodipine      Hemorrhoids   -topical preparation H  -monitor              Diet: Snacks/Supplements Adult: Other; Send Berry Magic Cup with dinner tray and allow pt/RN to order prn also (Also likes orange if in stock. NO van or ora); With Meals  Regular Diet Adult Thin Liquids (level 0)    DVT Prophylaxis: DOAC  Martino Catheter: Not present  Lines: None     Cardiac Monitoring: None  Code Status: Full Code      Clinically Significant Risk Factors                  # Hypertension: Noted on problem list                            Shelli Alejandro MD  Hospitalist Service, GOLD TEAM   [facility]  Securely message with [employer]  info)  Text page via [facility] Paging/Directory   See signed in provider for up to date coverage information  ______________________________________________________________________    Interval History   No acute events overnight.  Patient resting in bed during my visit and notes meals so far today have gone well, no nausea or abdominal pain or coughing with eating, she does not have dyspnea or pain and has no complaints for me this morning.  Patient agreeable to evaluation with PM&R today, and remains very hopeful for discharge to TCU to continue rehab and improve function and strength is much as she can.    Physical Exam   Vital Signs: Temp: 98.3  F (36.8  C) Temp src: Oral BP: 127/74 Pulse: 66   Resp: 16 SpO2: 95 % O2 Device: None (Room air)    Weight: 153 lbs 7.04 oz    Gen: NAD, lying comfortably in bed, pleasant and cooperative with interview and exam  HEENT: normocephalic, no scleral icterus, no perioral lesions, oral mucosa moist  CV: No mottling on upper extremities bilaterally  Pulm: No increased work of breathing, no tachypnea  Skin: no rash or jaundice on limited exam  Neuro: Alert, oriented to person and place and mostly to situation although forgetful, no tremor appreciated in upper extremities  Psych: mood-affect congruent      Medical Decision Making             Data

## 2023-11-01 NOTE — PLAN OF CARE
Alert and oriented x 3, disoriented  to time. Denies pain and nausea. Repositioned Q 2 hours last at 0615. Soft call light within reach. Incontinent of bowel and bladder. Pure wick in place and producing urine. Had small BM x 1. Bed alarm on.  Problem: Plan of Care - These are the overarching goals to be used throughout the patient stay.    Goal: Plan of Care Review  Description: The Plan of Care Review/Shift note should be completed every shift.  The Outcome Evaluation is a brief statement about your assessment that the patient is improving, declining, or no change.  This information will be displayed automatically on your shift note.  Outcome: Progressing  Goal: Absence of Hospital-Acquired Illness or Injury  Intervention: Prevent Skin Injury  Recent Flowsheet Documentation  Taken 11/1/2023 0615 by Annie Jaime RN  Body Position:   turned   left  Taken 11/1/2023 0246 by Annie Jaime RN  Body Position:   turned   right   heels elevated  Taken 11/1/2023 0033 by Annie Jaime RN  Body Position:   turned   left   legs elevated   Goal Outcome Evaluation:

## 2023-11-01 NOTE — PROGRESS NOTES
Chemo drug: Bevacizumab  Tolerated: Well  Intervention: None needed  Response: NA  Plan: Continue with POC

## 2023-11-01 NOTE — PLAN OF CARE
"/60 (BP Location: Right arm)   Pulse 66   Temp 97.6  F (36.4  C) (Axillary)   Resp 18   Ht 1.702 m (5' 7\")   Wt 70.4 kg (155 lb 3.3 oz)   SpO2 96%   BMI 24.31 kg/m      AVSS on RA. Disoriented to time. Up with 2 assist and lift. Repositioned q2 hrs. Purewick in place. Continue with poc    Problem: Hypertension Comorbidity  Goal: Blood Pressure in Desired Range  Outcome: Progressing  Intervention: Maintain Blood Pressure Management  Recent Flowsheet Documentation  Taken 10/31/2023 1530 by Capo Mcguire, RN  Medication Review/Management: medications reviewed     Problem: Communication Impairment  Goal: Effective Communication Skills  Outcome: Progressing     "

## 2023-11-02 ENCOUNTER — APPOINTMENT (OUTPATIENT)
Dept: OCCUPATIONAL THERAPY | Facility: CLINIC | Age: 78
DRG: 054 | End: 2023-11-02
Attending: INTERNAL MEDICINE
Payer: MEDICARE

## 2023-11-02 PROCEDURE — 250N000013 HC RX MED GY IP 250 OP 250 PS 637: Performed by: STUDENT IN AN ORGANIZED HEALTH CARE EDUCATION/TRAINING PROGRAM

## 2023-11-02 PROCEDURE — 250N000012 HC RX MED GY IP 250 OP 636 PS 637: Performed by: STUDENT IN AN ORGANIZED HEALTH CARE EDUCATION/TRAINING PROGRAM

## 2023-11-02 PROCEDURE — 97530 THERAPEUTIC ACTIVITIES: CPT | Mod: GO

## 2023-11-02 PROCEDURE — 97535 SELF CARE MNGMENT TRAINING: CPT | Mod: GO

## 2023-11-02 PROCEDURE — 120N000005 HC R&B MS OVERFLOW UMMC

## 2023-11-02 PROCEDURE — 99231 SBSQ HOSP IP/OBS SF/LOW 25: CPT | Performed by: INTERNAL MEDICINE

## 2023-11-02 PROCEDURE — 250N000013 HC RX MED GY IP 250 OP 250 PS 637: Performed by: INTERNAL MEDICINE

## 2023-11-02 RX ADMIN — LEVETIRACETAM 1250 MG: 750 TABLET, FILM COATED ORAL at 19:58

## 2023-11-02 RX ADMIN — AMLODIPINE BESYLATE 5 MG: 5 TABLET ORAL at 09:23

## 2023-11-02 RX ADMIN — FLUOXETINE 40 MG: 20 CAPSULE ORAL at 09:23

## 2023-11-02 RX ADMIN — ALBUTEROL SULFATE 2 PUFF: 90 AEROSOL, METERED RESPIRATORY (INHALATION) at 15:02

## 2023-11-02 RX ADMIN — ALBUTEROL SULFATE 2 PUFF: 90 AEROSOL, METERED RESPIRATORY (INHALATION) at 09:24

## 2023-11-02 RX ADMIN — BUSPIRONE HYDROCHLORIDE 30 MG: 30 TABLET ORAL at 09:24

## 2023-11-02 RX ADMIN — DEXAMETHASONE 2 MG: 2 TABLET ORAL at 19:58

## 2023-11-02 RX ADMIN — DEXAMETHASONE 2 MG: 2 TABLET ORAL at 09:23

## 2023-11-02 RX ADMIN — PANTOPRAZOLE SODIUM 40 MG: 40 TABLET, DELAYED RELEASE ORAL at 09:23

## 2023-11-02 RX ADMIN — LACOSAMIDE 100 MG: 50 TABLET, FILM COATED ORAL at 09:24

## 2023-11-02 RX ADMIN — LEVETIRACETAM 1250 MG: 750 TABLET, FILM COATED ORAL at 09:23

## 2023-11-02 RX ADMIN — BUSPIRONE HYDROCHLORIDE 30 MG: 30 TABLET ORAL at 19:58

## 2023-11-02 RX ADMIN — LACOSAMIDE 100 MG: 50 TABLET, FILM COATED ORAL at 19:58

## 2023-11-02 RX ADMIN — SULFAMETHOXAZOLE AND TRIMETHOPRIM 1 TABLET: 800; 160 TABLET ORAL at 09:23

## 2023-11-02 RX ADMIN — THERA TABS 1 TABLET: TAB at 09:23

## 2023-11-02 RX ADMIN — ALBUTEROL SULFATE 2 PUFF: 90 AEROSOL, METERED RESPIRATORY (INHALATION) at 19:58

## 2023-11-02 RX ADMIN — PANTOPRAZOLE SODIUM 40 MG: 40 TABLET, DELAYED RELEASE ORAL at 17:14

## 2023-11-02 RX ADMIN — RIVAROXABAN 20 MG: 10 TABLET, FILM COATED ORAL at 17:14

## 2023-11-02 ASSESSMENT — ACTIVITIES OF DAILY LIVING (ADL)
ADLS_ACUITY_SCORE: 65

## 2023-11-02 NOTE — PROGRESS NOTES
Care Management Follow Up    Length of Stay (days): 26    Expected Discharge Date: 11/02/2023     Concerns to be Addressed: discharge planning     Patient plan of care discussed at interdisciplinary rounds: Yes    Anticipated Discharge Disposition: Transitional Care, Long Term Care, Home Care, Assisted Living     Anticipated Discharge Services:  TCU  Anticipated Discharge DME:  (TBD)    Patient/family educated on Medicare website which has current facility and service quality ratings: yes  Education Provided on the Discharge Plan: Yes  Patient/Family in Agreement with the Plan: yes    Referrals Placed by CM/SW: Post Acute Facilities  Private pay costs discussed: Not applicable    Additional Information:  SW reviewed the PM&R note. Per note: patient has the rehab goals, motivation, insight and intent that would support an admission to a transitional care unit for further inpatient rehab     3:00 pm: CAMRON messaged Jailyn Mi, [facility] admissions, asking for her to review pt. Asking her to read the PM&R note and pt's chemo medication and times it will be given. SW is waiting for a response  The medication pt is on is for Cancer directive therapy      SW to follow and assist with any other discharge needs that may arise.  DAYNA Peres   5A beds:5220-40  5C beds 5417-32 (no BMT pt's)     Phone: [phone]  Pager: [phone]

## 2023-11-02 NOTE — PLAN OF CARE
Afebrile, VSS on RA. Denies pain, N/V. X1 soft stool overnight. Purewick in place and working well. Oriented x3, disoriented to time. Repositioned q2h overnight. Patient resting between cares.     Problem: Plan of Care - These are the overarching goals to be used throughout the patient stay.    Goal: Plan of Care Review  Description: The Plan of Care Review/Shift note should be completed every shift.  The Outcome Evaluation is a brief statement about your assessment that the patient is improving, declining, or no change.  This information will be displayed automatically on your shift note.  Outcome: Progressing     Problem: Seizure Disorder Comorbidity  Goal: Maintenance of Seizure Control  Outcome: Progressing     Problem: Hypertension Comorbidity  Goal: Blood Pressure in Desired Range  Outcome: Progressing  Intervention: Maintain Blood Pressure Management  Recent Flowsheet Documentation  Taken 11/2/2023 0340 by Funmilayo Bangura RN  Medication Review/Management: medications reviewed  Taken 11/1/2023 2320 by Funmilayo Bangura RN  Medication Review/Management: medications reviewed  Taken 11/1/2023 1940 by Funmilayo Bangura RN  Medication Review/Management: medications reviewed   Goal Outcome Evaluation:

## 2023-11-02 NOTE — PROGRESS NOTES
[facility]    Medicine Progress Note - Hospitalist Service, GOLD TEAM 10    Date of Admission:  10/6/2023    Assessment & Plan   Olga Bailey is a 78 year old female admitted on 10/6/2023. She has history of L frontoparietal glioblastoma s/p resection, chemotherapy and radiation with remission in 2021, recently found to have recurrence in September 2023, with baseline cognitive and functional impairment, h/o seizures, h/o DVT on xarelto, HTN, and depression who presented 10/6/23 with acute worsening of baseline aphasia as well as new facial twitching. Admitted to internal medicine for further work up and management. Neurology consulted and patient found to be having partial seizures for which lacosamide was added to keppra with improvement in seizure activity. Continued radiotherapy while on [facility] but has now transferred to [place] for concomitant bevacizumab. Tolerated bevacizumab well however developed worsening headaches and tiredness at reduced dose of steroids so increased back up to 4 mg BID per rad onc. Completed 10 days of radiation on 10/25/23. Medically ready to discharge to hopefully TCU.    Today:  --RNCC/SW team working on safe discharge plan, greatly appreciate assistance  --PM&R consulted to assist with evaluation for appropriate dispo and assess rehab potential, greatly appreciate recommendations, recommend TCU.     Partial Seizure, previously controlled  Worsened chronic aphasia   Difficulty Swallowing  Presented with acute (< 1 day) worsening of aphasia and new facial twitching with abnormal mouth movements concerning for partial seizure.  She underwent stroke eval in the ED which was reassuring against acute CVA,   Given recent recurrence of glioblastoma, there was concern for spread or advance of disease, though both CT and MRI were reassuring to stability of current malignancy from last imaging (9/2023).  No metabolic derangements to  explain acute change, and norm WBC with no focal findings was reassuring against acute infection.  Neurology consulted and felt movements were consistent with partial seizure, lacosamide was added, though subsequent EEG showed some degree of persistent seizure activity, and further brief clinical seizures were observed.  Neurologist discussed risk/benefit of a possible third antiepileptic medication (which could carry a risk of heavier sedation), the family elected to remain on the two current medications for quality of life reasons.  - Neurology followed patient   - Continue levetiracetam 1250 mg BID   - Lacosamide was started with 200mg IV loading dose (complete) then 100mg IV BID              - transitioned to PO on 10/16              - Could increase to 150mg BID if increased seizure burden but stable on current dosing   - May reconsider third agent should seizure burden change drastically (ie Grand Mal, etc)   - Lorazepam PRN for any seizure activity > 2 minutes or increased clusters of seizure like activity   - Neuro checks stopped, stable      H/o L frontoparietal glioblastoma s/p resection, chemotherapy and radiation (completed May 2021), with recurrence of malignancy 2023   Cognitive and functional impairment due to glioblastoma and chemoradiation   Seen by oncology on this visit, repeat imaging stable from September. Started on trial of steroid, eval symptom improvement. Patient has developed worsening difficulty with swallowing, now eating more. Reduced decadron to 2 mg IV BID from 4 mg BID on 10/17 however patient developed worsening headache on 10/19 so dose increased.  - Oncology consulted, appreciate input - signed off, s/p bevacizumab 10/18, 11/1 (spoke with heme-onc Dr. Babcock for approval); due again 11/15  - Rad/Onc consulted, RT started on 10/12 for 10 fractions - finished on 10/25  - Dexamethasone 4mg IV BID  (10/9 - 10/25) - headache resolved and no new neuro symptoms. Start tapering as of  10/26 - 2 mg BID x 2 weeks (10/26-11/9), 2 mg daily x 2 weeks (11/10-11/23), then stop.  - TMP-SMX daily for PCP PPX (pt had hx of PJP)   - PT/OT consulted, appreciate input  Rec TCU vs LTC. Family and patient hopeful for TCU - per , she was functional prior to admission and hopes to return to that level of functionality. Awaiting placement. Also keep in mind she has bevacizumab infusion on 11/1 and will need to be at a facility that will allow her to go to this infusion appointment. She is independent with feeding. SLP recommends upright position and awake/alert when eating.      Likely aspiration pneumonitis: resolved  Coughing with medications, significant water suctioned.  Felt a little short of breath afterwords, which she felt improved with a duoneb.  On exam, she does have scattered bilateral wheezes present.   - brianna albuterol MDI q6H while awake   - DuoNeb QID PRN   - SLP consult completed, appreciate input. Soft diet level 6 with thin liquids.     Depression   - Continue PTA Buspirone and Fluoxetine      H/o DVT   - Continue PTA rivaroxaban      HTN   - Continue PTA Amlodipine      Hemorrhoids   -topical preparation H  -monitor              Diet: Snacks/Supplements Adult: Other; Send Berry Magic Cup with dinner tray and allow pt/RN to order prn also (Also likes orange if in stock. NO van or ora); With Meals  Regular Diet Adult Thin Liquids (level 0)    DVT Prophylaxis: DOAC  Martino Catheter: Not present  Lines: None     Cardiac Monitoring: None  Code Status: Full Code      Clinically Significant Risk Factors                  # Hypertension: Noted on problem list                   Disposition Plan      Expected Discharge Date: 11/02/2023    Discharge Delays: Placement - TCU  Destination: assisted living;nursing home  Discharge Comments: Done w/ radiation on 10/25 then patient would prefer TCU to try and get stronger  awaiting TCU placement, medically ready  Bevacizumab [id number] (last dose 11/1)             Shelli Alejandro MD  Hospitalist Service, GOLD TEAM 10  [facility]  Securely message with [employer] (more info)  Text page via [email or web address] Paging/Directory   See signed in provider for up to date coverage information  ______________________________________________________________________    Interval History   No acute events overnight.  Patient is in good spirits and waiting for lunch to arrive.  Had a good visit with physical medicine rehabilitation team yesterday and remains hopeful for discharge to TCU as she is very motivated to improve function.  No report of nausea, pain, constipation, or dyspnea.  Has been bedside and supportive.    Physical Exam   Vital Signs: Temp: 98.2  F (36.8  C) Temp src: Axillary BP: (!) 149/72 Pulse: 65   Resp: 16 SpO2: 96 % O2 Device: None (Room air)    Weight: 153 lbs 7.04 oz    Gen: NAD, sitting up comfortably in bed, pleasant and cooperative with interview and exam  HEENT: normocephalic, no scleral icterus, no perioral lesions, oral mucosa moist  CV: RRR at time of exam  Pulm: good air movement bilaterally without wheezing  Abd: soft, +bs, nontender to palpation diffusely, nondistended  Skin: no rash or jaundice on limited exam  Neuro: AOx3 benefits from frequent reminders and memory cues as forgetful, no tremor  Psych: mood-affect congruent      Medical Decision Making             Data

## 2023-11-03 ENCOUNTER — APPOINTMENT (OUTPATIENT)
Dept: OCCUPATIONAL THERAPY | Facility: CLINIC | Age: 78
DRG: 054 | End: 2023-11-03
Attending: INTERNAL MEDICINE
Payer: MEDICARE

## 2023-11-03 ENCOUNTER — APPOINTMENT (OUTPATIENT)
Dept: PHYSICAL THERAPY | Facility: CLINIC | Age: 78
DRG: 054 | End: 2023-11-03
Attending: INTERNAL MEDICINE
Payer: MEDICARE

## 2023-11-03 PROCEDURE — 250N000013 HC RX MED GY IP 250 OP 250 PS 637: Performed by: INTERNAL MEDICINE

## 2023-11-03 PROCEDURE — 250N000013 HC RX MED GY IP 250 OP 250 PS 637: Performed by: STUDENT IN AN ORGANIZED HEALTH CARE EDUCATION/TRAINING PROGRAM

## 2023-11-03 PROCEDURE — 120N000005 HC R&B MS OVERFLOW UMMC

## 2023-11-03 PROCEDURE — 99231 SBSQ HOSP IP/OBS SF/LOW 25: CPT | Performed by: INTERNAL MEDICINE

## 2023-11-03 PROCEDURE — 250N000012 HC RX MED GY IP 250 OP 636 PS 637: Performed by: STUDENT IN AN ORGANIZED HEALTH CARE EDUCATION/TRAINING PROGRAM

## 2023-11-03 PROCEDURE — 97530 THERAPEUTIC ACTIVITIES: CPT | Mod: GP

## 2023-11-03 PROCEDURE — 97530 THERAPEUTIC ACTIVITIES: CPT | Mod: GO

## 2023-11-03 RX ADMIN — DEXAMETHASONE 2 MG: 2 TABLET ORAL at 08:58

## 2023-11-03 RX ADMIN — LACOSAMIDE 100 MG: 50 TABLET, FILM COATED ORAL at 08:58

## 2023-11-03 RX ADMIN — THERA TABS 1 TABLET: TAB at 08:57

## 2023-11-03 RX ADMIN — FLUOXETINE 40 MG: 20 CAPSULE ORAL at 08:58

## 2023-11-03 RX ADMIN — ALBUTEROL SULFATE 2 PUFF: 90 AEROSOL, METERED RESPIRATORY (INHALATION) at 08:58

## 2023-11-03 RX ADMIN — PANTOPRAZOLE SODIUM 40 MG: 40 TABLET, DELAYED RELEASE ORAL at 17:00

## 2023-11-03 RX ADMIN — ALBUTEROL SULFATE 2 PUFF: 90 AEROSOL, METERED RESPIRATORY (INHALATION) at 13:19

## 2023-11-03 RX ADMIN — ALBUTEROL SULFATE 2 PUFF: 90 AEROSOL, METERED RESPIRATORY (INHALATION) at 20:11

## 2023-11-03 RX ADMIN — RIVAROXABAN 20 MG: 10 TABLET, FILM COATED ORAL at 17:00

## 2023-11-03 RX ADMIN — BUSPIRONE HYDROCHLORIDE 30 MG: 30 TABLET ORAL at 20:11

## 2023-11-03 RX ADMIN — SULFAMETHOXAZOLE AND TRIMETHOPRIM 1 TABLET: 800; 160 TABLET ORAL at 08:57

## 2023-11-03 RX ADMIN — BUSPIRONE HYDROCHLORIDE 30 MG: 30 TABLET ORAL at 08:56

## 2023-11-03 RX ADMIN — LEVETIRACETAM 1250 MG: 750 TABLET, FILM COATED ORAL at 20:11

## 2023-11-03 RX ADMIN — LEVETIRACETAM 1250 MG: 750 TABLET, FILM COATED ORAL at 08:57

## 2023-11-03 RX ADMIN — PANTOPRAZOLE SODIUM 40 MG: 40 TABLET, DELAYED RELEASE ORAL at 08:58

## 2023-11-03 RX ADMIN — DEXAMETHASONE 2 MG: 2 TABLET ORAL at 20:11

## 2023-11-03 RX ADMIN — AMLODIPINE BESYLATE 5 MG: 5 TABLET ORAL at 08:57

## 2023-11-03 RX ADMIN — LACOSAMIDE 100 MG: 50 TABLET, FILM COATED ORAL at 20:11

## 2023-11-03 ASSESSMENT — ACTIVITIES OF DAILY LIVING (ADL)
ADLS_ACUITY_SCORE: 65
ADLS_ACUITY_SCORE: 65
ADLS_ACUITY_SCORE: 63
ADLS_ACUITY_SCORE: 65
ADLS_ACUITY_SCORE: 63
ADLS_ACUITY_SCORE: 65
ADLS_ACUITY_SCORE: 65
ADLS_ACUITY_SCORE: 63
ADLS_ACUITY_SCORE: 65
ADLS_ACUITY_SCORE: 64
ADLS_ACUITY_SCORE: 63
ADLS_ACUITY_SCORE: 64

## 2023-11-03 NOTE — PLAN OF CARE
Goal Outcome Evaluation:      Plan of Care Reviewed With: patient  Problem: Plan of Care - These are the overarching goals to be used throughout the patient stay.    Goal: Plan of Care Review  Description: The Plan of Care Review/Shift note should be completed every shift.  The Outcome Evaluation is a brief statement about your assessment that the patient is improving, declining, or no change.  This information will be displayed automatically on your shift note.  Outcome: Not Progressing  Flowsheets (Taken 11/3/2023 2229)  Plan of Care Reviewed With: patient    VSS neuros intact, A&O x2, does not know date or location. Aphasia, slow to answer questions. generalized weakness with R>L. Incontinence of urine & stool. Purewick in place while in bed working well. PIV patent. Skin intact, mepilex protecting sacral area. Assist of two, using lift for transfer. Up in the chair since 1400. PT/OT working with pt. Awaiting TCU placement.

## 2023-11-03 NOTE — PROGRESS NOTES
[facility]    Medicine Progress Note - Hospitalist Service, GOLD TEAM 10    Date of Admission:  10/6/2023    Assessment & Plan   Olga Bailey is a 78 year old female admitted on 10/6/2023. She has history of L frontoparietal glioblastoma s/p resection, chemotherapy and radiation with remission in 2021, recently found to have recurrence in September 2023, with baseline cognitive and functional impairment, h/o seizures, h/o DVT on xarelto, HTN, and depression who presented 10/6/23 with acute worsening of baseline aphasia as well as new facial twitching. Admitted to internal medicine for further work up and management. Neurology consulted and patient found to be having partial seizures for which lacosamide was added to keppra with improvement in seizure activity. Continued radiotherapy while on [facility] but has now transferred to [place] for concomitant bevacizumab. Tolerated bevacizumab well however developed worsening headaches and tiredness at reduced dose of steroids so increased back up to 4 mg BID per rad onc. Completed 10 days of radiation on 10/25/23. Medically ready to discharge to hopefully TCU.    Today:  --RNCC/SW team working on safe discharge plan, greatly appreciate assistance  --PM&R consulted to assist with evaluation for appropriate dispo and assess rehab potential, greatly appreciate recommendations, agree with PT/OT assessments and also recommend TCU.     Partial Seizure, previously controlled  Worsened chronic aphasia   Difficulty Swallowing  Presented with acute (< 1 day) worsening of aphasia and new facial twitching with abnormal mouth movements concerning for partial seizure.  She underwent stroke eval in the ED which was reassuring against acute CVA,   Given recent recurrence of glioblastoma, there was concern for spread or advance of disease, though both CT and MRI were reassuring to stability of current malignancy from last imaging  (9/2023).  No metabolic derangements to explain acute change, and norm WBC with no focal findings was reassuring against acute infection.  Neurology consulted and felt movements were consistent with partial seizure, lacosamide was added, though subsequent EEG showed some degree of persistent seizure activity, and further brief clinical seizures were observed.  Neurologist discussed risk/benefit of a possible third antiepileptic medication (which could carry a risk of heavier sedation), the family elected to remain on the two current medications for quality of life reasons.  - Neurology followed patient   - Continue levetiracetam 1250 mg BID   - Lacosamide was started with 200mg IV loading dose (complete) then 100mg IV BID              - transitioned to PO on 10/16              - Could increase to 150mg BID if increased seizure burden but stable on current dosing   - May reconsider third agent should seizure burden change drastically (ie Grand Mal, etc)   - Lorazepam PRN for any seizure activity > 2 minutes or increased clusters of seizure like activity   - Neuro checks stopped, stable      H/o L frontoparietal glioblastoma s/p resection, chemotherapy and radiation (completed May 2021), with recurrence of malignancy 2023   Cognitive and functional impairment due to glioblastoma and chemoradiation   Seen by oncology on this visit, repeat imaging stable from September. Started on trial of steroid, eval symptom improvement. Patient has developed worsening difficulty with swallowing, now eating more. Reduced decadron to 2 mg IV BID from 4 mg BID on 10/17 however patient developed worsening headache on 10/19 so dose increased.  - Oncology consulted, appreciate input - signed off, s/p bevacizumab 10/18, 11/1 (spoke with heme-onc Dr. Babcock for approval); due again 11/15  - Rad/Onc consulted, RT started on 10/12 for 10 fractions - finished on 10/25  - Dexamethasone 4mg IV BID  (10/9 - 10/25) - headache resolved and no new  neuro symptoms. Start tapering as of 10/26 - 2 mg BID x 2 weeks (10/26-11/9), 2 mg daily x 2 weeks (11/10-11/23), then stop.  - TMP-SMX daily for PCP PPX (pt had hx of PJP)   - PT/OT consulted, appreciate input  Rec TCU vs LTC. Family and patient hopeful for TCU - per , she was functional prior to admission and hopes to return to that level of functionality. Awaiting placement. Will need to be at a facility that will allow her to go to this infusion appointment. She is independent with feeding. SLP recommends upright position and awake/alert when eating.      Likely aspiration pneumonitis: resolved  Coughing with medications, significant water suctioned.  Felt a little short of breath afterwords, which she felt improved with a duoneb.  On exam, she does have scattered bilateral wheezes present.   - brianna albuterol MDI q6H while awake   - DuoNeb QID PRN   - SLP consult completed, appreciate input. Soft diet level 6 with thin liquids.     Depression   - Continue PTA Buspirone and Fluoxetine      H/o DVT   - Continue PTA rivaroxaban      HTN   - Continue PTA Amlodipine      Hemorrhoids   -topical preparation H  -monitor         Diet: Snacks/Supplements Adult: Other; Send Berry Magic Cup with dinner tray and allow pt/RN to order prn also (Also likes orange if in stock. NO van or ora); With Meals  Regular Diet Adult Thin Liquids (level 0)    DVT Prophylaxis: DOAC  Martino Catheter: Not present  Lines: None     Cardiac Monitoring: None  Code Status: Full Code      Clinically Significant Risk Factors                  # Hypertension: Noted on problem list                   Disposition Plan      Expected Discharge Date: 11/03/2023    Discharge Delays: Placement - TCU  Destination: assisted living;nursing home  Discharge Comments: Done w/ radiation on 10/25 then patient would prefer TCU to try and get stronger  awaiting TCU placement, medically ready  Bevacizumab [id number] (last dose 11/1)            Shelli Alejandro,  MD  Hospitalist Service, GOLD TEAM 10  [facility]  Securely message with [employer] (more info)  Text page via [employer] Paging/Directory   See signed in provider for up to date coverage information  ______________________________________________________________________    Interval History   No acute events overnight. Pt eating lunch today and able to feed herself after plating set up for her, enjoying her meal without any issues including no nausea or abdominal pain. No SOB or pain.     Physical Exam   Vital Signs: Temp: 98.2  F (36.8  C) Temp src: Oral BP: 124/89 Pulse: 64   Resp: 16 SpO2: 97 % O2 Device: None (Room air)    Weight: 153 lbs 7.04 oz    Gen: NAD, sitting up comfortably in bed, pleasant and cooperative with interview and exam  HEENT: normocephalic, no scleral icterus, no perioral lesions  CV: no mottling on UE bilaterally  Pulm: no increased work of breathing, no tachypnea  Skin: no rash or jaundice on limited exam  Neuro: AOx3, stable tremor in hands  Psych: mood-affect congruent      Medical Decision Making             Data

## 2023-11-03 NOTE — PLAN OF CARE
Pt afebrile. OVSS on RA. Pt denies pain, nausea, SOB. Disoriented to time, able to make needs known. Pt has good PO intake. Purewick in place, good UOP. 2 small BMs this shift. Repositioning q 2 hrs. Bed bath and linen change complete. Worked with therapy and sat in chair for the afternoon. Waiting for TCU placement.   Problem: Plan of Care - These are the overarching goals to be used throughout the patient stay.    Goal: Plan of Care Review  Description: The Plan of Care Review/Shift note should be completed every shift.  The Outcome Evaluation is a brief statement about your assessment that the patient is improving, declining, or no change.  This information will be displayed automatically on your shift note.  Outcome: Progressing   Goal Outcome Evaluation:

## 2023-11-03 NOTE — PLAN OF CARE
Pt is alert and oriented x4, but intermittently forgetfull. AVSS, afebrile on RA. Pt denies pain, nausea, vomiting, numbness and tinling. Up via lyft. Call light within reach, no unmet need at this time.

## 2023-11-04 PROCEDURE — 250N000013 HC RX MED GY IP 250 OP 250 PS 637: Performed by: STUDENT IN AN ORGANIZED HEALTH CARE EDUCATION/TRAINING PROGRAM

## 2023-11-04 PROCEDURE — 99231 SBSQ HOSP IP/OBS SF/LOW 25: CPT | Performed by: INTERNAL MEDICINE

## 2023-11-04 PROCEDURE — 120N000005 HC R&B MS OVERFLOW UMMC

## 2023-11-04 PROCEDURE — 250N000013 HC RX MED GY IP 250 OP 250 PS 637: Performed by: INTERNAL MEDICINE

## 2023-11-04 PROCEDURE — 250N000012 HC RX MED GY IP 250 OP 636 PS 637: Performed by: STUDENT IN AN ORGANIZED HEALTH CARE EDUCATION/TRAINING PROGRAM

## 2023-11-04 RX ADMIN — AMLODIPINE BESYLATE 5 MG: 5 TABLET ORAL at 08:42

## 2023-11-04 RX ADMIN — BUSPIRONE HYDROCHLORIDE 30 MG: 30 TABLET ORAL at 19:45

## 2023-11-04 RX ADMIN — ALBUTEROL SULFATE 2 PUFF: 90 AEROSOL, METERED RESPIRATORY (INHALATION) at 19:45

## 2023-11-04 RX ADMIN — DEXAMETHASONE 2 MG: 2 TABLET ORAL at 08:42

## 2023-11-04 RX ADMIN — ALBUTEROL SULFATE 2 PUFF: 90 AEROSOL, METERED RESPIRATORY (INHALATION) at 08:40

## 2023-11-04 RX ADMIN — BUSPIRONE HYDROCHLORIDE 30 MG: 30 TABLET ORAL at 08:43

## 2023-11-04 RX ADMIN — LEVETIRACETAM 1250 MG: 750 TABLET, FILM COATED ORAL at 08:40

## 2023-11-04 RX ADMIN — DEXAMETHASONE 2 MG: 2 TABLET ORAL at 19:44

## 2023-11-04 RX ADMIN — THERA TABS 1 TABLET: TAB at 08:42

## 2023-11-04 RX ADMIN — LACOSAMIDE 100 MG: 50 TABLET, FILM COATED ORAL at 08:42

## 2023-11-04 RX ADMIN — ALBUTEROL SULFATE 2 PUFF: 90 AEROSOL, METERED RESPIRATORY (INHALATION) at 14:45

## 2023-11-04 RX ADMIN — PANTOPRAZOLE SODIUM 40 MG: 40 TABLET, DELAYED RELEASE ORAL at 08:42

## 2023-11-04 RX ADMIN — FLUOXETINE 40 MG: 20 CAPSULE ORAL at 08:42

## 2023-11-04 RX ADMIN — LEVETIRACETAM 1250 MG: 750 TABLET, FILM COATED ORAL at 19:44

## 2023-11-04 RX ADMIN — PANTOPRAZOLE SODIUM 40 MG: 40 TABLET, DELAYED RELEASE ORAL at 17:09

## 2023-11-04 RX ADMIN — LACOSAMIDE 100 MG: 50 TABLET, FILM COATED ORAL at 19:44

## 2023-11-04 RX ADMIN — ALBUTEROL SULFATE 2 PUFF: 90 AEROSOL, METERED RESPIRATORY (INHALATION) at 02:29

## 2023-11-04 RX ADMIN — SULFAMETHOXAZOLE AND TRIMETHOPRIM 1 TABLET: 800; 160 TABLET ORAL at 08:42

## 2023-11-04 RX ADMIN — RIVAROXABAN 20 MG: 10 TABLET, FILM COATED ORAL at 17:09

## 2023-11-04 ASSESSMENT — ACTIVITIES OF DAILY LIVING (ADL)
ADLS_ACUITY_SCORE: 64
ADLS_ACUITY_SCORE: 65
ADLS_ACUITY_SCORE: 64
ADLS_ACUITY_SCORE: 65
ADLS_ACUITY_SCORE: 64

## 2023-11-04 NOTE — PLAN OF CARE
"/66 (BP Location: Right arm)   Pulse 72   Temp 97.4  F (36.3  C) (Axillary)   Resp 16   Ht 1.702 m (5' 7\")   Wt 69.6 kg (153 lb 7 oz)   SpO2 93%   BMI 24.03 kg/m      VSS on room air. Affect is flat; one word answers to questions. Oriented to self and situation; disoriented to time and place. Needs assistance with feeding; hand tremors present. Purewick in place. Takes pills with applesauce. Continue with plan of care; will notify provider with changes.     Goal Outcome Evaluation:      Plan of Care Reviewed With: patient    Overall Patient Progress: no change      Problem: Plan of Care - These are the overarching goals to be used throughout the patient stay.    Goal: Plan of Care Review  Description: The Plan of Care Review/Shift note should be completed every shift.  The Outcome Evaluation is a brief statement about your assessment that the patient is improving, declining, or no change.  This information will be displayed automatically on your shift note.  Outcome: Progressing  Flowsheets (Taken 11/4/2023 1854)  Plan of Care Reviewed With: patient  Overall Patient Progress: no change     Problem: Seizure Disorder Comorbidity  Goal: Maintenance of Seizure Control  Outcome: Progressing     Problem: Hypertension Comorbidity  Goal: Blood Pressure in Desired Range  Outcome: Progressing  Intervention: Maintain Blood Pressure Management  Recent Flowsheet Documentation  Taken 11/4/2023 1700 by Deana Lloyd RN  Medication Review/Management: medications reviewed     Problem: Thought Process Alteration  Goal: Optimal Thought Clarity  Outcome: Progressing  Intervention: Minimize Safety Risk and Altered Thought  Recent Flowsheet Documentation  Taken 11/4/2023 1700 by Deana Lloyd RN  Enhanced Safety Measures: family to remain at bedside     Problem: Swallowing Impairment  Goal: Optimal Eating/Swallowing without Aspiration  Outcome: Progressing  Intervention: Optimize Eating and Swallowing  Recent " Flowsheet Documentation  Taken 11/4/2023 1700 by Deana Lloyd RN  Aspiration Precautions: oral hygiene care promoted  Swallowing Interventions: Dysphagia: monitored for cough during intake  Feeding/Eating Techniques: feeding assistance provided     Problem: Communication Impairment  Goal: Effective Communication Skills  Outcome: Progressing  Intervention: Optimize Communication Skills  Recent Flowsheet Documentation  Taken 11/4/2023 1700 by Deana Lloyd, RN  Communication Enhancement Strategies: extra time allowed for response

## 2023-11-04 NOTE — PLAN OF CARE
Problem: Plan of Care - These are the overarching goals to be used throughout the patient stay.    Goal: Plan of Care Review  Description: The Plan of Care Review/Shift note should be completed every shift.  The Outcome Evaluation is a brief statement about your assessment that the patient is improving, declining, or no change.  This information will be displayed automatically on your shift note.  Outcome: Not Progressing   Goal Outcome Evaluation:      Plan of Care Reviewed With: patient  Stable with no change in status. Affect is flat, appropriate one word answers to questions. Is oriented to self and situation.  is at bedside and attentive. VSS RA Mark is good, needs assistance with feeding. Hand tremors persists. Bed bath given and teeth brushed. Has been sitting in the chair since 1100. Purwick in place working well. One large incont stool. Formed. .Takes pills with applesauce easily. PIV patent. Transition to TCU when avail.

## 2023-11-04 NOTE — PLAN OF CARE
"1500- 2300    /71 (BP Location: Right arm)   Pulse 66   Temp 98.8  F (37.1  C) (Axillary)   Resp 16   Ht 1.702 m (5' 7\")   Wt 69.6 kg (153 lb 7 oz)   SpO2 94%   BMI 24.03 kg/m      VSS on RA, Afebrile. Denies pain, N/V, SOB. A/Ox2; disoriented to place and time. Mild aphasia.  at bedside throughout shift. Pt had good appetite. She can eat on her own if the tray is set up. Stool incontinence x1. Good UOP through purewick. Pt turned throughout. Sat up in chair for 1 hr during shift. Continue POC.     "

## 2023-11-04 NOTE — PLAN OF CARE
Denies pain and nausea. Disoriented to time. Repositioned Q 2 hours last at 0700. Pure wick in place and voiding good amount. Bed alarm on.  Problem: Plan of Care - These are the overarching goals to be used throughout the patient stay.    Goal: Plan of Care Review  Description: The Plan of Care Review/Shift note should be completed every shift.  The Outcome Evaluation is a brief statement about your assessment that the patient is improving, declining, or no change.  This information will be displayed automatically on your shift note.  Outcome: Progressing  Goal: Absence of Hospital-Acquired Illness or Injury  Intervention: Identify and Manage Fall Risk  Recent Flowsheet Documentation  Taken 11/4/2023 0038 by Annie Jaime RN  Safety Promotion/Fall Prevention:   assistive device/personal items within reach   clutter free environment maintained  Intervention: Prevent Skin Injury  Recent Flowsheet Documentation  Taken 11/4/2023 0700 by Annie Jaime RN  Body Position:   turned   left   side-lying  Taken 11/4/2023 0445 by Anine Jaime RN  Body Position:   turned   right   side-lying  Taken 11/4/2023 0245 by Annie Jamie RN  Body Position:   turned   left   side-lying  Taken 11/4/2023 0052 by Annie Jaime RN  Body Position:   turned   right   legs elevated   side-lying   Goal Outcome Evaluation:

## 2023-11-04 NOTE — PROGRESS NOTES
[facility]    Medicine Progress Note - Hospitalist Service, GOLD TEAM 10    Date of Admission:  10/6/2023    Assessment & Plan   Olga Bailey is a 78 year old female admitted on 10/6/2023. She has history of L frontoparietal glioblastoma s/p resection, chemotherapy and radiation with remission in 2021, recently found to have recurrence in September 2023, with baseline cognitive and functional impairment, h/o seizures, h/o DVT on xarelto, HTN, and depression who presented 10/6/23 with acute worsening of baseline aphasia as well as new facial twitching. Admitted to internal medicine for further work up and management. Neurology consulted and patient found to be having partial seizures for which lacosamide was added to keppra with improvement in seizure activity. Continued radiotherapy while on [facility] but has now transferred to [place] for concomitant bevacizumab. Tolerated bevacizumab well however developed worsening headaches and tiredness at reduced dose of steroids so increased back up to 4 mg BID per rad onc. Completed 10 days of radiation on 10/25/23. Medically ready to discharge to hopefully TCU.    Today:  --RNCC/SW team working on safe discharge plan, greatly appreciate assistance. Looking at community TCU options, has been refused by  TCU multiple times.  --PM&R consulted this admission to assist with evaluation for appropriate dispo and assess rehab potential, greatly appreciate recommendations, agree with PT/OT assessments and also recommend TCU.      Partial Seizure, previously controlled  Worsened chronic aphasia   Difficulty Swallowing  Presented with acute (< 1 day) worsening of aphasia and new facial twitching with abnormal mouth movements concerning for partial seizure.  She underwent stroke eval in the ED which was reassuring against acute CVA,   Given recent recurrence of glioblastoma, there was concern for spread or advance of disease,  though both CT and MRI were reassuring to stability of current malignancy from last imaging (9/2023).  No metabolic derangements to explain acute change, and norm WBC with no focal findings was reassuring against acute infection.  Neurology consulted and felt movements were consistent with partial seizure, lacosamide was added, though subsequent EEG showed some degree of persistent seizure activity, and further brief clinical seizures were observed.  Neurologist discussed risk/benefit of a possible third antiepileptic medication (which could carry a risk of heavier sedation), the family elected to remain on the two current medications for quality of life reasons.  - Neurology consulted this admission  - Continue levetiracetam 1250 mg BID   - Lacosamide was started with 200mg IV loading dose (complete) then 100mg IV BID              - transitioned to PO on 10/16              - Could increase to 150mg BID if increased seizure burden but stable on current dosing   - May reconsider third agent should seizure burden change drastically (ie Grand Mal, etc)   - Lorazepam PRN for any seizure activity > 2 minutes or increased clusters of seizure like activity   - Neuro checks stopped, stable      H/o L frontoparietal glioblastoma s/p resection, chemotherapy and radiation (completed May 2021), with recurrence of malignancy 2023   Cognitive and functional impairment due to glioblastoma and chemoradiation   Seen by oncology on this visit, repeat imaging stable from September. Started on trial of steroid, eval symptom improvement. Patient has developed worsening difficulty with swallowing, now eating more. Reduced decadron to 2 mg IV BID from 4 mg BID on 10/17 however patient developed worsening headache on 10/19 so dose increased.  - Oncology consulted, appreciate input - signed off, s/p bevacizumab 10/18, 11/1; due again 11/15  - Rad/Onc consulted, RT started on 10/12 for 10 fractions - finished on 10/25  - Dexamethasone 4mg IV  BID  (10/9 - 10/25) - headache resolved and no new neuro symptoms. Start tapering as of 10/26 - 2 mg BID x 2 weeks (10/26-11/9), 2 mg daily x 2 weeks (11/10-11/23), then stop.  - TMP-SMX daily for PCP PPX (pt had hx of PJP)   - PT/OT consulted, appreciate input  Rec TCU vs LTC. Family and patient hopeful for TCU - per , she was functional prior to admission and hopes to return to that level of functionality. Awaiting placement. Will need to be at a facility that will allow her to go to this infusion appointment. SLP recommends upright position and awake/alert when eating.      Likely aspiration pneumonitis: resolved  Coughing with medications, significant water suctioned.  Felt a little short of breath afterwords, which she felt improved with a duoneb.  On exam, she does have scattered bilateral wheezes present.   - brianna albuterol MDI q6H while awake   - DuoNeb QID PRN   - SLP consult completed, appreciate input. Soft diet level 6 with thin liquids.     Depression   - Continue PTA Buspirone and Fluoxetine      H/o DVT   - Continue PTA rivaroxaban      HTN   - Continue PTA Amlodipine      Hemorrhoids   -topical preparation H  -monitor           Diet: Snacks/Supplements Adult: Other; Send [employer] Magic Cup with dinner tray and allow pt/RN to order prn also (Also likes orange if in stock. NO van or ora); With Meals  Regular Diet Adult Thin Liquids (level 0)    DVT Prophylaxis: DOAC  Martino Catheter: Not present  Lines: None     Cardiac Monitoring: None  Code Status: Full Code      Clinically Significant Risk Factors                  # Hypertension: Noted on problem list                   Disposition Plan             Shelli Alejandro MD  Hospitalist Service, GOLD TEAM [address] [place] [address]  Securely message with [employer] (more info)  Text page via [employer] Paging/Directory   See signed in provider for up to date coverage  information  ______________________________________________________________________    Interval History   No acute events overnight.  Patient sitting up in chair, had just eaten lunch and notes that this went well without any nausea or discomfort.  No complaint for me this morning including no dyspnea or pain.  Continues to be very motivated to work with therapies and is hopeful for as much therapy as possible here in the hospital prior to discharge.  Passed bedside and supportive.    Physical Exam   Vital Signs: Temp: 97.6  F (36.4  C) Temp src: Axillary BP: 131/72 Pulse: 60   Resp: 16 SpO2: 95 % O2 Device: None (Room air)    Weight: 153 lbs 7.04 oz    Gen: NAD, sitting up comfortably in chair, pleasant and cooperative with interview and exam  HEENT: normocephalic, no scleral icterus, no perioral lesions, oral mucosa moist  Pulm: No tachypnea or increased work of breathing, no breathlessness with conversation  Skin: no rash or jaundice on limited exam  Neuro: AOx3, stable tremor in upper extremities left greater than right  Psych: mood-affect congruent      Medical Decision Making             Data

## 2023-11-05 PROCEDURE — 250N000013 HC RX MED GY IP 250 OP 250 PS 637: Performed by: STUDENT IN AN ORGANIZED HEALTH CARE EDUCATION/TRAINING PROGRAM

## 2023-11-05 PROCEDURE — 99231 SBSQ HOSP IP/OBS SF/LOW 25: CPT | Performed by: INTERNAL MEDICINE

## 2023-11-05 PROCEDURE — 250N000012 HC RX MED GY IP 250 OP 636 PS 637: Performed by: STUDENT IN AN ORGANIZED HEALTH CARE EDUCATION/TRAINING PROGRAM

## 2023-11-05 PROCEDURE — 120N000005 HC R&B MS OVERFLOW UMMC

## 2023-11-05 PROCEDURE — 250N000013 HC RX MED GY IP 250 OP 250 PS 637: Performed by: INTERNAL MEDICINE

## 2023-11-05 RX ADMIN — ALBUTEROL SULFATE 2 PUFF: 90 AEROSOL, METERED RESPIRATORY (INHALATION) at 19:26

## 2023-11-05 RX ADMIN — THERA TABS 1 TABLET: TAB at 08:45

## 2023-11-05 RX ADMIN — LEVETIRACETAM 1250 MG: 750 TABLET, FILM COATED ORAL at 19:25

## 2023-11-05 RX ADMIN — FLUOXETINE 40 MG: 20 CAPSULE ORAL at 08:44

## 2023-11-05 RX ADMIN — DEXAMETHASONE 2 MG: 2 TABLET ORAL at 19:25

## 2023-11-05 RX ADMIN — ALBUTEROL SULFATE 2 PUFF: 90 AEROSOL, METERED RESPIRATORY (INHALATION) at 08:46

## 2023-11-05 RX ADMIN — DEXAMETHASONE 2 MG: 2 TABLET ORAL at 08:45

## 2023-11-05 RX ADMIN — PANTOPRAZOLE SODIUM 40 MG: 40 TABLET, DELAYED RELEASE ORAL at 16:54

## 2023-11-05 RX ADMIN — PANTOPRAZOLE SODIUM 40 MG: 40 TABLET, DELAYED RELEASE ORAL at 08:45

## 2023-11-05 RX ADMIN — SULFAMETHOXAZOLE AND TRIMETHOPRIM 1 TABLET: 800; 160 TABLET ORAL at 08:45

## 2023-11-05 RX ADMIN — ALBUTEROL SULFATE 2 PUFF: 90 AEROSOL, METERED RESPIRATORY (INHALATION) at 02:26

## 2023-11-05 RX ADMIN — BUSPIRONE HYDROCHLORIDE 30 MG: 30 TABLET ORAL at 19:25

## 2023-11-05 RX ADMIN — AMLODIPINE BESYLATE 5 MG: 5 TABLET ORAL at 08:46

## 2023-11-05 RX ADMIN — LACOSAMIDE 100 MG: 50 TABLET, FILM COATED ORAL at 19:25

## 2023-11-05 RX ADMIN — BUSPIRONE HYDROCHLORIDE 30 MG: 30 TABLET ORAL at 08:44

## 2023-11-05 RX ADMIN — LACOSAMIDE 100 MG: 50 TABLET, FILM COATED ORAL at 08:45

## 2023-11-05 RX ADMIN — LEVETIRACETAM 1250 MG: 750 TABLET, FILM COATED ORAL at 08:45

## 2023-11-05 RX ADMIN — RIVAROXABAN 20 MG: 10 TABLET, FILM COATED ORAL at 16:54

## 2023-11-05 ASSESSMENT — ACTIVITIES OF DAILY LIVING (ADL)
ADLS_ACUITY_SCORE: 65
ADLS_ACUITY_SCORE: 61
ADLS_ACUITY_SCORE: 65

## 2023-11-05 NOTE — PROGRESS NOTES
[facility]    Medicine Progress Note - Hospitalist Service, GOLD TEAM 10    Date of Admission:  10/6/2023    Assessment & Plan   Olga Bailey is a 78 year old female admitted on 10/6/2023. She has history of L frontoparietal glioblastoma s/p resection, chemotherapy and radiation with remission in 2021, recently found to have recurrence in September 2023, with baseline cognitive and functional impairment, h/o seizures, h/o DVT on xarelto, HTN, and depression who presented 10/6/23 with acute worsening of baseline aphasia as well as new facial twitching. Admitted to internal medicine for further work up and management. Neurology consulted and patient found to be having partial seizures for which lacosamide was added to keppra with improvement in seizure activity. Continued radiotherapy while on [facility] but has now transferred to [place] for concomitant bevacizumab. Tolerated bevacizumab well however developed worsening headaches and tiredness at reduced dose of steroids so increased back up to 4 mg BID per rad onc. Completed 10 days of radiation on 10/25/23. Medically ready to discharge to hopefully TCU.    --RNCC/SW team working on safe discharge plan, greatly appreciate assistance. Looking at community TCU options, has been refused by  TCU multiple times.  --PM&R consulted this admission to assist with evaluation for appropriate dispo and assess rehab potential, greatly appreciate recommendations, agree with PT/OT assessments and also recommend TCU.   --Last lab check 10/31, no need for daily labs at this time     Partial Seizure, previously controlled  Worsened chronic aphasia   Difficulty Swallowing  Presented with acute (< 1 day) worsening of aphasia and new facial twitching with abnormal mouth movements concerning for partial seizure.  She underwent stroke eval in the ED which was reassuring against acute CVA,   Given recent recurrence of glioblastoma,  there was concern for spread or advance of disease, though both CT and MRI were reassuring to stability of current malignancy from last imaging (9/2023).  No metabolic derangements to explain acute change, and norm WBC with no focal findings was reassuring against acute infection.  Neurology consulted and felt movements were consistent with partial seizure, lacosamide was added, though subsequent EEG showed some degree of persistent seizure activity, and further brief clinical seizures were observed.  Neurologist discussed risk/benefit of a possible third antiepileptic medication (which could carry a risk of heavier sedation), the family elected to remain on the two current medications for quality of life reasons.  - Neurology consulted this admission  - Continue levetiracetam 1250 mg BID   - Lacosamide was started with 200mg IV loading dose (complete) then 100mg IV BID              - transitioned to PO on 10/16              - Could increase to 150mg BID if increased seizure burden but stable on current dosing   - May reconsider third agent should seizure burden change drastically (ie Grand Mal, etc)   - Lorazepam PRN for any seizure activity > 2 minutes or increased clusters of seizure like activity      H/o L frontoparietal glioblastoma s/p resection, chemotherapy and radiation (completed May 2021), with recurrence of malignancy 2023   Cognitive and functional impairment due to glioblastoma and chemoradiation   Seen by oncology on this visit, repeat imaging stable from September. Started on trial of steroid, eval symptom improvement. Patient has developed worsening difficulty with swallowing, now eating more. Reduced decadron to 2 mg IV BID from 4 mg BID on 10/17 however patient developed worsening headache on 10/19 so dose increased.  - Oncology consulted, appreciate input - signed off, s/p bevacizumab 10/18, 11/1; due again 11/15  - Rad/Onc consulted, RT started on 10/12 for 10 fractions - finished on 10/25  -  Dexamethasone 4mg IV BID  (10/9 - 10/25) - headache resolved and no new neuro symptoms. Start tapering as of 10/26 - 2 mg BID x 2 weeks (10/26-11/9), 2 mg daily x 2 weeks (11/10-11/23), then stop.  - TMP-SMX daily for PCP PPX (pt had hx of PJP)   - PT/OT consulted, appreciate input  Rec TCU vs LTC. Family and patient hopeful for TCU - per , she was functional prior to admission and hopes to return to that level of functionality. Awaiting placement. Will need to be at a facility that will allow her to go to this infusion appointment. SLP recommends upright position and awake/alert when eating.      Likely aspiration pneumonitis: resolved  - brianna albuterol MDI q6H while awake   - DuoNeb QID PRN   - SLP consult completed, appreciate input. Soft diet level 6 with thin liquids.     Depression   - Continue PTA Buspirone and Fluoxetine      H/o DVT   - Continue PTA rivaroxaban      HTN   - Continue PTA Amlodipine      Hemorrhoids   -topical preparation H  -monitor           Diet: Snacks/Supplements Adult: Other; Send Berry Magic Cup with dinner tray and allow pt/RN to order prn also (Also likes orange if in stock. NO van or ora); With Meals  Regular Diet Adult Thin Liquids (level 0)    DVT Prophylaxis: DOAC  Martino Catheter: Not present  Lines: None     Cardiac Monitoring: None  Code Status: Full Code      Clinically Significant Risk Factors                  # Hypertension: Noted on problem list                   Disposition Plan             Shelli Alejandro MD  Hospitalist Service, GOLD TEAM 10  [facility]  Securely message with [employer] (more info)  Text page via [email or web address] Paging/Directory   See signed in provider for up to date coverage information  ______________________________________________________________________    Interval History   No acute events overnight. Pt sitting up in chair, had lunch and notes she slept well overnight. Reports bowel movement today and  denies constipation symptoms. No report of nausea, pain or dyspnea.  bedside and supportive.     Physical Exam   Vital Signs: Temp: 97.5  F (36.4  C) Temp src: Oral BP: (!) 141/86 Pulse: 69   Resp: 16 SpO2: 96 % O2 Device: None (Room air)    Weight: 153 lbs 7.04 oz    Gen: NAD, sitting up in chair, pleasant and cooperative with interview and exam  HEENT: normocephalic, no scleral icterus, no perioral lesions, oral mucosa moist  CV: RRR at time of exam  Pulm: good air movement bilaterally without wheezing on limited exam  Abd: soft, +bs, nontender to palpation diffusely, nondistended  LE: no edema bilaterally  Skin: no rash or jaundice on limited exam  Neuro: AOx3  Psych: mood-affect congruent      Medical Decision Making             Data

## 2023-11-05 NOTE — PLAN OF CARE
Problem: Plan of Care - These are the overarching goals to be used throughout the patient stay.    Goal: Plan of Care Review  Description: The Plan of Care Review/Shift note should be completed every shift.  The Outcome Evaluation is a brief statement about your assessment that the patient is improving, declining, or no change.  This information will be displayed automatically on your shift note.  Outcome: Progressing  Flowsheets (Taken 11/5/2023 2836)  Plan of Care Reviewed With: patient   Goal Outcome Evaluation:      Plan of Care Reviewed With: patient    Uneventful day. Eating meals with assistance, partial bed bath. Sat in the chair for 4h.  here, involved with care. Neuros unchanged, affect flat,answers all questions appropriately. Purewick patent and keeping bottom dry. Skin intact, large stool x1. Awaiting TCU/LTC placement..

## 2023-11-05 NOTE — PROGRESS NOTES
Temp: 98.1  F (36.7  C) Temp src: Oral BP: (!) 149/77 Pulse: 62   Resp: 16 SpO2: 95 % O2 Device: None (Room air)      SHIFT [id number]     A&Ox2-3, disoriented to time and place. VSS on RA overnight. Needs assistance with feeding, tremors present. R sided weakness. Purewick in place, oliguric overnight- slept most of shift and had little intake. Takes pills with applesauce.  will likely visit during the day and often does cares for pt. Q2 turns/cleaning d/t stool incontinence.     Continue to monitor and follow plan of care. Notify provider with changes. Progress towards safe discharge.

## 2023-11-06 ENCOUNTER — APPOINTMENT (OUTPATIENT)
Dept: PHYSICAL THERAPY | Facility: CLINIC | Age: 78
DRG: 054 | End: 2023-11-06
Attending: INTERNAL MEDICINE
Payer: MEDICARE

## 2023-11-06 ENCOUNTER — APPOINTMENT (OUTPATIENT)
Dept: OCCUPATIONAL THERAPY | Facility: CLINIC | Age: 78
DRG: 054 | End: 2023-11-06
Attending: INTERNAL MEDICINE
Payer: MEDICARE

## 2023-11-06 PROCEDURE — 250N000013 HC RX MED GY IP 250 OP 250 PS 637: Performed by: STUDENT IN AN ORGANIZED HEALTH CARE EDUCATION/TRAINING PROGRAM

## 2023-11-06 PROCEDURE — 97110 THERAPEUTIC EXERCISES: CPT | Mod: GP | Performed by: PHYSICAL THERAPIST

## 2023-11-06 PROCEDURE — 97535 SELF CARE MNGMENT TRAINING: CPT | Mod: GO

## 2023-11-06 PROCEDURE — 97530 THERAPEUTIC ACTIVITIES: CPT | Mod: GO

## 2023-11-06 PROCEDURE — 97530 THERAPEUTIC ACTIVITIES: CPT | Mod: GP | Performed by: PHYSICAL THERAPIST

## 2023-11-06 PROCEDURE — 99231 SBSQ HOSP IP/OBS SF/LOW 25: CPT | Performed by: INTERNAL MEDICINE

## 2023-11-06 PROCEDURE — 120N000005 HC R&B MS OVERFLOW UMMC

## 2023-11-06 PROCEDURE — 97112 NEUROMUSCULAR REEDUCATION: CPT | Mod: GO

## 2023-11-06 PROCEDURE — 250N000013 HC RX MED GY IP 250 OP 250 PS 637: Performed by: INTERNAL MEDICINE

## 2023-11-06 PROCEDURE — 250N000012 HC RX MED GY IP 250 OP 636 PS 637: Performed by: STUDENT IN AN ORGANIZED HEALTH CARE EDUCATION/TRAINING PROGRAM

## 2023-11-06 RX ADMIN — AMLODIPINE BESYLATE 5 MG: 5 TABLET ORAL at 09:00

## 2023-11-06 RX ADMIN — ALBUTEROL SULFATE 2 PUFF: 90 AEROSOL, METERED RESPIRATORY (INHALATION) at 02:13

## 2023-11-06 RX ADMIN — LACOSAMIDE 100 MG: 50 TABLET, FILM COATED ORAL at 19:41

## 2023-11-06 RX ADMIN — ALBUTEROL SULFATE 2 PUFF: 90 AEROSOL, METERED RESPIRATORY (INHALATION) at 09:00

## 2023-11-06 RX ADMIN — LEVETIRACETAM 1250 MG: 750 TABLET, FILM COATED ORAL at 19:41

## 2023-11-06 RX ADMIN — PANTOPRAZOLE SODIUM 40 MG: 40 TABLET, DELAYED RELEASE ORAL at 09:00

## 2023-11-06 RX ADMIN — LEVETIRACETAM 1250 MG: 750 TABLET, FILM COATED ORAL at 09:00

## 2023-11-06 RX ADMIN — LACOSAMIDE 100 MG: 50 TABLET, FILM COATED ORAL at 09:00

## 2023-11-06 RX ADMIN — PANTOPRAZOLE SODIUM 40 MG: 40 TABLET, DELAYED RELEASE ORAL at 17:13

## 2023-11-06 RX ADMIN — RIVAROXABAN 20 MG: 10 TABLET, FILM COATED ORAL at 17:13

## 2023-11-06 RX ADMIN — ALBUTEROL SULFATE 2 PUFF: 90 AEROSOL, METERED RESPIRATORY (INHALATION) at 19:52

## 2023-11-06 RX ADMIN — DEXAMETHASONE 2 MG: 2 TABLET ORAL at 09:00

## 2023-11-06 RX ADMIN — THERA TABS 1 TABLET: TAB at 09:00

## 2023-11-06 RX ADMIN — FLUOXETINE 40 MG: 20 CAPSULE ORAL at 09:00

## 2023-11-06 RX ADMIN — BUSPIRONE HYDROCHLORIDE 30 MG: 30 TABLET ORAL at 19:40

## 2023-11-06 RX ADMIN — DEXAMETHASONE 2 MG: 2 TABLET ORAL at 19:41

## 2023-11-06 RX ADMIN — SULFAMETHOXAZOLE AND TRIMETHOPRIM 1 TABLET: 800; 160 TABLET ORAL at 09:00

## 2023-11-06 RX ADMIN — BUSPIRONE HYDROCHLORIDE 30 MG: 30 TABLET ORAL at 09:00

## 2023-11-06 ASSESSMENT — ACTIVITIES OF DAILY LIVING (ADL)
ADLS_ACUITY_SCORE: 67
ADLS_ACUITY_SCORE: 65
ADLS_ACUITY_SCORE: 67
ADLS_ACUITY_SCORE: 65
ADLS_ACUITY_SCORE: 67
ADLS_ACUITY_SCORE: 65
ADLS_ACUITY_SCORE: 65
ADLS_ACUITY_SCORE: 67
ADLS_ACUITY_SCORE: 65

## 2023-11-06 NOTE — PLAN OF CARE
Problem: Plan of Care - These are the overarching goals to be used throughout the patient stay.    Goal: Plan of Care Review  Description: The Plan of Care Review/Shift note should be completed every shift.  The Outcome Evaluation is a brief statement about your assessment that the patient is improving, declining, or no change.  This information will be displayed automatically on your shift note.  Outcome: Not Progressing  Flowsheets (Taken 11/6/2023 1617)  Plan of Care Reviewed With: patient   Goal Outcome Evaluation:      Plan of Care Reviewed With: patient    Stable with no changes. VSS Appetite good. Will assist with some eating, but needs to be fed. Taking meds in applesauce easily. Incontinent of urine x3, purewick no working today. Up in chair for 4h, worked with OT in bed and now in wheelchair.  planned to take her for a ride in [place]. Denies pain. No c/o. Awaiting TCU placement.

## 2023-11-06 NOTE — PLAN OF CARE
Alert and disoriented time. Denies pain and nausea. Repositioned Q 2 hours. Pure wick in place and voiding. No BM this shift. No change in neuro's. Bed alarm on and soft call light in reach.  Problem: Plan of Care - These are the overarching goals to be used throughout the patient stay.    Goal: Plan of Care Review  Description: The Plan of Care Review/Shift note should be completed every shift.  The Outcome Evaluation is a brief statement about your assessment that the patient is improving, declining, or no change.  This information will be displayed automatically on your shift note.  Outcome: Progressing  Goal: Absence of Hospital-Acquired Illness or Injury  Intervention: Identify and Manage Fall Risk  Recent Flowsheet Documentation  Taken 11/6/2023 0000 by Annie Jaime RN  Safety Promotion/Fall Prevention:   clutter free environment maintained   safety round/check completed   room near nurse's station  Intervention: Prevent Skin Injury  Recent Flowsheet Documentation  Taken 11/6/2023 0439 by Annie Jaime RN  Body Position:   turned   left   side-lying  Taken 11/6/2023 0200 by Annie aJime RN  Body Position:   turned   right   side-lying  Taken 11/6/2023 0000 by Annie Jaime RN  Body Position:   turned   left   side-lying  Intervention: Prevent and Manage VTE (Venous Thromboembolism) Risk  Recent Flowsheet Documentation  Taken 11/6/2023 0000 by Annie Jaime RN  VTE Prevention/Management: SCDs (sequential compression devices) off   Goal Outcome Evaluation:

## 2023-11-06 NOTE — PLAN OF CARE
"/74 (BP Location: Left arm)   Pulse 71   Temp 98.3  F (36.8  C) (Oral)   Resp 16   Ht 1.702 m (5' 7\")   Wt 69.6 kg (153 lb 7 oz)   SpO2 94%   BMI 24.03 kg/m      VSS on room air. Affect is flat; one word answers to questions. Oriented to self and situation; disoriented to time and place. Needs assistance with feeding; hand tremors present. Purewick in place. Takes pills with applesauce. Awaiting TCU/LTC placement. Continue with plan of care; will notify provider with changes.      Goal Outcome Evaluation:      Plan of Care Reviewed With: patient    Overall Patient Progress: no change      Problem: Plan of Care - These are the overarching goals to be used throughout the patient stay.    Goal: Plan of Care Review  Description: The Plan of Care Review/Shift note should be completed every shift.  The Outcome Evaluation is a brief statement about your assessment that the patient is improving, declining, or no change.  This information will be displayed automatically on your shift note.  Outcome: Progressing  Flowsheets (Taken 11/5/2023 2253)  Plan of Care Reviewed With: patient  Overall Patient Progress: no change     Problem: Seizure Disorder Comorbidity  Goal: Maintenance of Seizure Control  Outcome: Progressing     Problem: Hypertension Comorbidity  Goal: Blood Pressure in Desired Range  Outcome: Progressing  Intervention: Maintain Blood Pressure Management  Recent Flowsheet Documentation  Taken 11/5/2023 2000 by Deana Lloyd, RN  Medication Review/Management: medications reviewed     Problem: Thought Process Alteration  Goal: Optimal Thought Clarity  Outcome: Progressing  Intervention: Minimize Safety Risk and Altered Thought  Recent Flowsheet Documentation  Taken 11/5/2023 2000 by Deana Lloyd, RN  Sensory Stimulation Regulation: quiet environment promoted  Enhanced Safety Measures: room near unit station  Taken 11/5/2023 1700 by Deana Lloyd, RN  Sensory Stimulation Regulation: quiet " environment promoted     Problem: Swallowing Impairment  Goal: Optimal Eating/Swallowing without Aspiration  Outcome: Progressing  Intervention: Optimize Eating and Swallowing  Recent Flowsheet Documentation  Taken 11/5/2023 2000 by Deana Lloyd RN  Swallowing Interventions: Dysphagia:   small bites/sips encouraged   upright position maintained 30 mins after intake  Feeding/Eating Techniques: feeding assistance provided     Problem: Communication Impairment  Goal: Effective Communication Skills  Outcome: Progressing  Intervention: Optimize Communication Skills  Recent Flowsheet Documentation  Taken 11/5/2023 2000 by Deana Lloyd RN  Communication Enhancement Strategies: extra time allowed for response  Taken 11/5/2023 1700 by Deana Lloyd, RN  Communication Enhancement Strategies: extra time allowed for response

## 2023-11-06 NOTE — PROGRESS NOTES
Care Management Follow Up    Length of Stay (days): 30    Expected Discharge Date:       Concerns to be Addressed: discharge planning     Patient plan of care discussed at interdisciplinary rounds: Yes    Anticipated Discharge Disposition: Transitional Care, Long Term Care, Home Care, Assisted Living     Anticipated Discharge Services:  n/a    Anticipated Discharge Services:  n/a     [facility]  [address]  , [place] [address] [phone]     (Assisted living facility) [facility]  [address] [place] [address] [phone]  [phone] (Alana RN)    Anticipated Discharge DME:  (TBD)    Patient/family educated on Medicare website which has current facility and service quality ratings: yes  Education Provided on the Discharge Plan: Yes  Patient/Family in Agreement with the Plan: yes    Referrals Placed by CM/SW: Post Acute Facilities  Private pay costs discussed: private room/amenity fees    Pending:   [facility]  [address]  [phone]    [address]   [phone]    [address]  [phone]    [address]   [phone]    DECLINED:   [facility] - TCU:   11/3: Declined:  She will be a longer stay and higher acuity than we are able to manage. She should be able to go to a community TCU as the TCU is not responsible for the chemo cost.  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  - Declined: they have to many acuity pt's and can't take pt on. They feel pt is more suited for a LTC facility  - [facility], assessed and declined for admit stating that pt's acuity level is to high and they cannot meet pt's needs  - [facility], is full with a  lengthy wait list  - [facility], assessed and declined indicating that pt's acuity is to high and cannot meet psychosocial needs  - [facility], pt assessed and declined for admit as they cannot meet pt's needs, acuity is to high and they do not have an available room with a overhead lift.  - [facility], they are not accepting any admit due to staffing.    Additional Information:  [facility] sent out referrals to  affiliated site that are closer to [place]    [facility] to follow and assist with any other discharge needs that may arise.  [facility]   5A beds:5220-40  5C beds 5417-32 (no BMT pt's)     Phone: [phone]  Pager: [phone]

## 2023-11-06 NOTE — PROGRESS NOTES
[facility]    Medicine Progress Note - Hospitalist Service, GOLD TEAM 10    Date of Admission:  10/6/2023    Assessment & Plan   Olga Bailey is a 78 year old female admitted on 10/6/2023. She has history of L frontoparietal glioblastoma s/p resection, chemotherapy and radiation with remission in 2021, recently found to have recurrence in September 2023, with baseline cognitive and functional impairment, h/o seizures, h/o DVT on xarelto, HTN, and depression who presented 10/6/23 with acute worsening of baseline aphasia as well as new facial twitching. Admitted to internal medicine for further work up and management. Neurology consulted and patient found to be having partial seizures for which lacosamide was added to keppra with improvement in seizure activity. Continued radiotherapy while on [facility] but has now transferred to [place] for concomitant bevacizumab. Tolerated bevacizumab well however developed worsening headaches and tiredness at reduced dose of steroids so increased back up to 4 mg BID per rad onc. Completed 10 days of radiation on 10/25/23. Medically ready to discharge to hopefully TCU.    --RNCC/SW team working on safe discharge plan, greatly appreciate assistance. Looking at community TCU options, has been refused by  TCU multiple times.  --PM&R consulted this admission to assist with evaluation for appropriate dispo and assess rehab potential, greatly appreciate recommendations, agree with PT/OT assessments and also recommend TCU.   --Last lab check 10/31 and labs in acceptable range, no need for daily labs at this time     Partial Seizure, previously controlled  Worsened chronic aphasia   Difficulty Swallowing  Presented with acute (< 1 day) worsening of aphasia and new facial twitching with abnormal mouth movements concerning for partial seizure.  She underwent stroke eval in the ED which was reassuring against acute CVA,   Given recent  recurrence of glioblastoma, there was concern for spread or advance of disease, though both CT and MRI were reassuring to stability of current malignancy from last imaging (9/2023).  No metabolic derangements to explain acute change, and norm WBC with no focal findings was reassuring against acute infection.  Neurology consulted and felt movements were consistent with partial seizure, lacosamide was added, though subsequent EEG showed some degree of persistent seizure activity, and further brief clinical seizures were observed.  Neurologist discussed risk/benefit of a possible third antiepileptic medication (which could carry a risk of heavier sedation), the family elected to remain on the two current medications for quality of life reasons.  - Neurology consulted this admission  - Continue levetiracetam 1250 mg BID   - Lacosamide was started with 200mg IV loading dose (complete) then 100mg IV BID              - transitioned to PO on 10/16              - Could increase to 150mg BID if increased seizure burden occurs, but has been stable on current dosing   - May reconsider third agent should seizure burden change drastically (ie Grand Mal, etc)   - Lorazepam PRN for any seizure activity > 2 minutes or increased clusters of seizure like activity      H/o L frontoparietal glioblastoma s/p resection, chemotherapy and radiation (completed May 2021), with recurrence of malignancy 2023   Cognitive and functional impairment due to glioblastoma and chemoradiation   Seen by oncology on this visit, repeat imaging stable from September. Started on trial of steroid, eval symptom improvement. Patient has developed worsening difficulty with swallowing, now eating more. Reduced decadron to 2 mg IV BID from 4 mg BID on 10/17 however patient developed worsening headache on 10/19 so dose increased.  - Oncology consulted, appreciate input - signed off, s/p bevacizumab 10/18, 11/1; due again 11/15  - Rad/Onc consulted, RT started on  10/12 for 10 fractions - finished on 10/25  - Dexamethasone 4mg IV BID  (10/9 - 10/25) - headache resolved and no new neuro symptoms. Start tapering as of 10/26 - 2 mg BID x 2 weeks (10/26-11/9), 2 mg daily x 2 weeks (11/10-11/23), then stop.  - TMP-SMX daily for PCP PPX (pt had hx of PJP)   - PT/OT consulted, appreciate input  Rec TCU vs LTC. Family and patient hopeful for TCU - per , she was functional prior to admission and hopes to return to that level of functionality. Awaiting placement. Will need to be at a facility that will allow her to go to this infusion appointment. SLP recommends upright position and awake/alert when eating.      Likely aspiration pneumonitis: resolved  - brianna albuterol MDI q6H while awake   - DuoNeb QID PRN   - SLP consult completed, appreciate input. Soft diet level 6 with thin liquids.     Depression   - Continue PTA Buspirone and Fluoxetine      H/o DVT   - Continue PTA rivaroxaban      HTN   - Continue PTA Amlodipine      Hemorrhoids   -topical preparation H  -monitor           Diet: Snacks/Supplements Adult: Other; Send [employer] Magic Cup with dinner tray and allow pt/RN to order prn also (Also likes orange if in stock. NO van or ora); With Meals  Regular Diet Adult Thin Liquids (level 0)    DVT Prophylaxis: DOAC  Martino Catheter: Not present  Lines: None     Cardiac Monitoring: None  Code Status: Full Code      Clinically Significant Risk Factors                  # Hypertension: Noted on problem list                   Disposition Plan             Shelli Alejandro MD  Hospitalist Service, GOLD TEAM [address] [place] [address]  Securely message with [employer] (more info)  Text page via [facility] Paging/Directory   See signed in provider for up to date coverage information  ______________________________________________________________________    Interval History   No acute overnight.  Patient resting comfortably sitting up in chair today at time of  visit, denies dyspnea, nausea or pain.  Was able to sleep well overnight and no issue with meals earlier in day today.  Spouse bedside and supportive.  Patient hoping to work with therapies later today.    Physical Exam   Vital Signs: Temp: 98.4  F (36.9  C) Temp src: Axillary BP: 135/77 Pulse: 58   Resp: 16 SpO2: 96 % O2 Device: None (Room air)    Weight: 153 lbs 7.04 oz    Gen: NAD, sitting up in chair, pleasant and cooperative with interview and exam  HEENT: normocephalic, no scleral icterus, no perioral lesions, oral mucosa moist  CV: RRR at time of exam  Pulm: good air movement bilaterally without wheezing on limited exam due to patient positioning in chair  Abd: soft, +bs, nontender to palpation diffusely, nondistended  LE: Minimal edema bilaterally  Skin: no rash or jaundice on limited exam  Neuro: AOx3, stable tremor  Psych: mood-affect congruent      Medical Decision Making             Data

## 2023-11-07 ENCOUNTER — APPOINTMENT (OUTPATIENT)
Dept: OCCUPATIONAL THERAPY | Facility: CLINIC | Age: 78
DRG: 054 | End: 2023-11-07
Attending: INTERNAL MEDICINE
Payer: MEDICARE

## 2023-11-07 PROCEDURE — 97530 THERAPEUTIC ACTIVITIES: CPT | Mod: GO | Performed by: OCCUPATIONAL THERAPIST

## 2023-11-07 PROCEDURE — 250N000012 HC RX MED GY IP 250 OP 636 PS 637: Performed by: STUDENT IN AN ORGANIZED HEALTH CARE EDUCATION/TRAINING PROGRAM

## 2023-11-07 PROCEDURE — 97535 SELF CARE MNGMENT TRAINING: CPT | Mod: GO | Performed by: OCCUPATIONAL THERAPIST

## 2023-11-07 PROCEDURE — 120N000005 HC R&B MS OVERFLOW UMMC

## 2023-11-07 PROCEDURE — 250N000013 HC RX MED GY IP 250 OP 250 PS 637: Performed by: STUDENT IN AN ORGANIZED HEALTH CARE EDUCATION/TRAINING PROGRAM

## 2023-11-07 PROCEDURE — 99232 SBSQ HOSP IP/OBS MODERATE 35: CPT | Performed by: STUDENT IN AN ORGANIZED HEALTH CARE EDUCATION/TRAINING PROGRAM

## 2023-11-07 PROCEDURE — 250N000013 HC RX MED GY IP 250 OP 250 PS 637: Performed by: INTERNAL MEDICINE

## 2023-11-07 PROCEDURE — 97112 NEUROMUSCULAR REEDUCATION: CPT | Mod: GO | Performed by: OCCUPATIONAL THERAPIST

## 2023-11-07 RX ADMIN — RIVAROXABAN 20 MG: 10 TABLET, FILM COATED ORAL at 17:19

## 2023-11-07 RX ADMIN — PANTOPRAZOLE SODIUM 40 MG: 40 TABLET, DELAYED RELEASE ORAL at 08:43

## 2023-11-07 RX ADMIN — LACOSAMIDE 100 MG: 50 TABLET, FILM COATED ORAL at 19:28

## 2023-11-07 RX ADMIN — LEVETIRACETAM 1250 MG: 750 TABLET, FILM COATED ORAL at 08:43

## 2023-11-07 RX ADMIN — ALBUTEROL SULFATE 2 PUFF: 90 AEROSOL, METERED RESPIRATORY (INHALATION) at 14:13

## 2023-11-07 RX ADMIN — DEXAMETHASONE 2 MG: 2 TABLET ORAL at 19:28

## 2023-11-07 RX ADMIN — LEVETIRACETAM 1250 MG: 750 TABLET, FILM COATED ORAL at 19:28

## 2023-11-07 RX ADMIN — PANTOPRAZOLE SODIUM 40 MG: 40 TABLET, DELAYED RELEASE ORAL at 17:19

## 2023-11-07 RX ADMIN — BUSPIRONE HYDROCHLORIDE 30 MG: 30 TABLET ORAL at 08:43

## 2023-11-07 RX ADMIN — THERA TABS 1 TABLET: TAB at 08:43

## 2023-11-07 RX ADMIN — AMLODIPINE BESYLATE 5 MG: 5 TABLET ORAL at 08:46

## 2023-11-07 RX ADMIN — FLUOXETINE 40 MG: 20 CAPSULE ORAL at 08:43

## 2023-11-07 RX ADMIN — ALBUTEROL SULFATE 2 PUFF: 90 AEROSOL, METERED RESPIRATORY (INHALATION) at 08:43

## 2023-11-07 RX ADMIN — DEXAMETHASONE 2 MG: 2 TABLET ORAL at 08:43

## 2023-11-07 RX ADMIN — SULFAMETHOXAZOLE AND TRIMETHOPRIM 1 TABLET: 800; 160 TABLET ORAL at 08:43

## 2023-11-07 RX ADMIN — LACOSAMIDE 100 MG: 50 TABLET, FILM COATED ORAL at 08:43

## 2023-11-07 RX ADMIN — BUSPIRONE HYDROCHLORIDE 30 MG: 30 TABLET ORAL at 19:28

## 2023-11-07 ASSESSMENT — ACTIVITIES OF DAILY LIVING (ADL)
ADLS_ACUITY_SCORE: 67

## 2023-11-07 NOTE — PROGRESS NOTES
[facility]    Medicine Progress Note - Hospitalist Service, GOLD TEAM 10    Date of Admission:  10/6/2023    Assessment & Plan   Olga Bailey is a 78 year old female admitted on 10/6/2023. She has history of L frontoparietal glioblastoma s/p resection, chemotherapy and radiation with remission in 2021, recently found to have recurrence in September 2023, with baseline cognitive and functional impairment, h/o seizures, h/o DVT on xarelto, HTN, and depression who presented 10/6/23 with acute worsening of baseline aphasia as well as new facial twitching. Admitted to internal medicine for further work up and management. Neurology consulted and patient found to be having partial seizures for which lacosamide was added to keppra with improvement in seizure activity. Continued radiotherapy while on [facility] but has now transferred to [place] for concomitant bevacizumab. Tolerated bevacizumab well however developed worsening headaches and tiredness at reduced dose of steroids so increased back up to 4 mg BID per rad onc. Completed 10 days of radiation on 10/25/23. Medically ready to discharge to hopefully TCU.    Discharge planning  - Medically stable for discharge pending safe discharge plan.  - RNCC/SW team working on safe discharge plan, greatly appreciate assistance. Looking at community TCU options, has been refused by  TCU multiple times.  - PM&R consulted this admission to assist with evaluation for appropriate dispo and assess rehab potential, greatly appreciate recommendations, agree with PT/OT assessments and also recommend TCU.   - Last lab check 10/31 and labs in acceptable range, no need for daily labs at this time     Partial Seizure, previously controlled  Worsened chronic aphasia   Difficulty Swallowing  Presented with acute (< 1 day) worsening of aphasia and new facial twitching with abnormal mouth movements concerning for partial seizure.  She  underwent stroke eval in the ED which was reassuring against acute CVA,   Given recent recurrence of glioblastoma, there was concern for spread or advance of disease, though both CT and MRI were reassuring to stability of current malignancy from last imaging (9/2023).  No metabolic derangements to explain acute change, and norm WBC with no focal findings was reassuring against acute infection.  Neurology consulted and felt movements were consistent with partial seizure, lacosamide was added, though subsequent EEG showed some degree of persistent seizure activity, and further brief clinical seizures were observed.  Neurologist discussed risk/benefit of a possible third antiepileptic medication (which could carry a risk of heavier sedation), the family elected to remain on the two current medications for quality of life reasons.  - Neurology consulted this admission  - Continue levetiracetam 1250 mg BID   - Lacosamide was started with 200mg IV loading dose (complete) then 100mg IV BID              - Transitioned to PO on 10/16  - Could increase to 150mg BID if increased seizure burden occurs, but has been stable on current dosing   - May reconsider third agent should seizure burden change drastically (ie Grand Mal, etc)   - Lorazepam PRN for any seizure activity > 2 minutes or increased clusters of seizure like activity      H/o L frontoparietal glioblastoma s/p resection, chemotherapy and radiation (completed May 2021), with recurrence of malignancy 2023   Cognitive and functional impairment due to glioblastoma and chemoradiation   Seen by oncology on this visit, repeat imaging stable from September. Started on trial of steroid, eval symptom improvement. Patient has developed worsening difficulty with swallowing, now eating more. Reduced decadron to 2 mg IV BID from 4 mg BID on 10/17 however patient developed worsening headache on 10/19 so dose increased.  - Oncology consulted, appreciate input - signed off, s/p  bevacizumab 10/18, 11/1; due again 11/15  - Rad/Onc consulted, RT started on 10/12 for 10 fractions - finished on 10/25  - Dexamethasone 4mg IV BID  (10/9 - 10/25) - headache resolved and no new neuro symptoms. Start tapering as of 10/26 - 2 mg BID x 2 weeks (10/26-11/9), 2 mg daily x 2 weeks (11/10-11/23), then stop.  - TMP-SMX daily for PCP PPX (pt had hx of PJP)   - PT/OT consulted, appreciate input  Rec TCU vs LTC. Family and patient hopeful for TCU - per , she was functional prior to admission and hopes to return to that level of functionality. Awaiting placement. Will need to be at a facility that will allow her to go to this infusion appointment. SLP recommends upright position and awake/alert when eating.      Likely aspiration pneumonitis: resolved  - brianna albuterol MDI q6H while awake   - DuoNeb QID PRN   - SLP consult completed, appreciate input. Soft diet level 6 with thin liquids.     Depression   - Continue PTA Buspirone and Fluoxetine      H/o DVT   - Continue PTA rivaroxaban      HTN   - Continue PTA Amlodipine      Hemorrhoids   -topical preparation H  -monitor           Diet: Snacks/Supplements Adult: Other; Send Berry Magic Cup with dinner tray and allow pt/RN to order prn also (Also likes orange if in stock. NO van or ora); With Meals  Regular Diet Adult Thin Liquids (level 0)    DVT Prophylaxis: DOAC  Martino Catheter: Not present  Lines: None     Cardiac Monitoring: None  Code Status: Full Code      Clinically Significant Risk Factors                  # Hypertension: Noted on problem list                   Disposition Plan             Stephanie Calloway MD  Hospitalist Service, GOLD TEAM 10  [facility]  Securely message with [employer] (more info)  Text page via [email or web address] Paging/Directory   See signed in provider for up to date coverage information  ______________________________________________________________________    Interval History   No acute  events overnight. Resting comfortably this morning. Denies headache. Denies pain.    Physical Exam   Vital Signs: Temp: 97.7  F (36.5  C) Temp src: Axillary BP: 103/84 Pulse: 57   Resp: 16 SpO2: 95 % O2 Device: None (Room air)    Weight: 153 lbs 7.04 oz    General Appearance: NAD. Lying comfortably in bed.  Respiratory: CTAB. No increased WOB.  Cardiovascular: RRR. No m/r/g  GI: Soft. Non-tender. Non-distended.  Skin: No rash.  Other: AOx4. Moving all extremities. Normal affect.    Medical Decision Making       35 MINUTES SPENT BY ME on the date of service doing chart review, history, exam, documentation & further activities per the note.      Data         Imaging results reviewed over the past 24 hrs:   No results found for this or any previous visit (from the past 24 hour(s)).

## 2023-11-07 NOTE — PLAN OF CARE
"BP (!) 144/73 (BP Location: Right arm)   Pulse 63   Temp 97.6  F (36.4  C) (Oral)   Resp 16   Ht 1.702 m (5' 7\")   Wt 69.6 kg (153 lb 7 oz)   SpO2 93%   BMI 24.03 kg/m    Pt A&Ox2. Elevated BP, other VSS on ra. Denies pain. Assist x2 with lift. Purewick in place with adequate urine output. IV saline locked. Continue on POC.   Problem: Plan of Care - These are the overarching goals to be used throughout the patient stay.    Goal: Plan of Care Review  Description: The Plan of Care Review/Shift note should be completed every shift.  The Outcome Evaluation is a brief statement about your assessment that the patient is improving, declining, or no change.  This information will be displayed automatically on your shift note.  Outcome: Progressing   Goal Outcome Evaluation:                        "

## 2023-11-07 NOTE — PROGRESS NOTES
Care Management Follow Up    Length of Stay (days): 31    Expected Discharge Date:       Concerns to be Addressed: discharge planning     Patient plan of care discussed at interdisciplinary rounds: Yes    Anticipated Discharge Disposition: Transitional Care, Long Term Care, Home Care, Assisted Living  Anticipated Discharge Services:      [facility]  [address]  [place] [address]  [phone]     [facility]  [address]  [place] [address] [phone] (Alana RN)    Anticipated Discharge DME:  (TBD)    Patient/family educated on Medicare website which has current facility and service quality ratings: yes  Education Provided on the Discharge Plan: Yes  Patient/Family in Agreement with the Plan: yes    Referrals Placed by CM/SW: Post Acute Facilities  Private pay costs discussed: private room/amenity fees    Pending:      [facility] - declined too high acuity and cost 11/7  [address] [facility]  [phone]  11/7:  spoke with .  The TCU does have one opening this week.  SW let them know that patient is a two-person assist and is working towards 1 person assist to return back to her assisted living.   was told that that should not be any problems as long as patient does not need any other acuity cares.   faxed the referral for them to review    DECLINED:   [facility] - TCU:   11/3: Declined:  She will be a longer stay and higher acuity than we are able to manage. She should be able to go to a community TCU as the TCU is not responsible for the chemo cost.  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  - Declined: they have to many acuity pt's and can't take pt on. They feel pt is more suited for a LTC facility  - [facility], assessed and declined for admit  stating that pt's acuity level is to high and they cannot meet pt's needs  - [facility], is full with a lengthy wait list  - [facility], assessed and declined indicating that pt's acuity is to high and cannot meet psychosocial needs  - [facility], pt assessed and declined for admit as they cannot meet pt's needs, acuity is to high and they do not have an available room with a overhead lift.  - [facility], they are not accepting any admit due to staffing.  -[facility] - acuity to high  -[facility] - no beds   [facility]  -[facility]- Acuity to high    Additional Information:  CAMRON received a phone call from Alana with [facility].  She would like an update on patient's status for coming back to assisted living.  CAMRON called and left a message letting Alana know that she is still working on placing patient in a TCU.  CAMRON will update Alana with placement if patient is admitted into a TCU.     CAMRON met with pt and . Pt was sitting in a chair. [facility] let them know she has talked with her supervisor about denials and asking for assistance with placement at [facility].   CAMRON updated them on the denials from the recent most recent TCU referrals.   Pt and her  thanked CAMRON for her assistance with referrals for TCU placement.       SW to follow and assist with any other discharge needs that may arise.  DAYNA Peres   5A beds:5220-40  5C beds 5417-32 (no BMT pt's)     Phone: [phone]  Pager: [phone]

## 2023-11-08 ENCOUNTER — APPOINTMENT (OUTPATIENT)
Dept: OCCUPATIONAL THERAPY | Facility: CLINIC | Age: 78
DRG: 054 | End: 2023-11-08
Attending: INTERNAL MEDICINE
Payer: MEDICARE

## 2023-11-08 ENCOUNTER — APPOINTMENT (OUTPATIENT)
Dept: PHYSICAL THERAPY | Facility: CLINIC | Age: 78
DRG: 054 | End: 2023-11-08
Attending: INTERNAL MEDICINE
Payer: MEDICARE

## 2023-11-08 PROCEDURE — 250N000013 HC RX MED GY IP 250 OP 250 PS 637: Performed by: INTERNAL MEDICINE

## 2023-11-08 PROCEDURE — 97112 NEUROMUSCULAR REEDUCATION: CPT | Mod: GP

## 2023-11-08 PROCEDURE — 99232 SBSQ HOSP IP/OBS MODERATE 35: CPT | Performed by: STUDENT IN AN ORGANIZED HEALTH CARE EDUCATION/TRAINING PROGRAM

## 2023-11-08 PROCEDURE — 120N000005 HC R&B MS OVERFLOW UMMC

## 2023-11-08 PROCEDURE — 250N000013 HC RX MED GY IP 250 OP 250 PS 637: Performed by: STUDENT IN AN ORGANIZED HEALTH CARE EDUCATION/TRAINING PROGRAM

## 2023-11-08 PROCEDURE — 97110 THERAPEUTIC EXERCISES: CPT | Mod: GP

## 2023-11-08 PROCEDURE — 97530 THERAPEUTIC ACTIVITIES: CPT | Mod: GP

## 2023-11-08 PROCEDURE — 97112 NEUROMUSCULAR REEDUCATION: CPT | Mod: GO

## 2023-11-08 PROCEDURE — 250N000012 HC RX MED GY IP 250 OP 636 PS 637: Performed by: STUDENT IN AN ORGANIZED HEALTH CARE EDUCATION/TRAINING PROGRAM

## 2023-11-08 RX ADMIN — ALBUTEROL SULFATE 2 PUFF: 90 AEROSOL, METERED RESPIRATORY (INHALATION) at 19:53

## 2023-11-08 RX ADMIN — THERA TABS 1 TABLET: TAB at 09:03

## 2023-11-08 RX ADMIN — LACOSAMIDE 100 MG: 50 TABLET, FILM COATED ORAL at 19:47

## 2023-11-08 RX ADMIN — DEXAMETHASONE 2 MG: 2 TABLET ORAL at 09:04

## 2023-11-08 RX ADMIN — PANTOPRAZOLE SODIUM 40 MG: 40 TABLET, DELAYED RELEASE ORAL at 16:46

## 2023-11-08 RX ADMIN — ALBUTEROL SULFATE 2 PUFF: 90 AEROSOL, METERED RESPIRATORY (INHALATION) at 10:41

## 2023-11-08 RX ADMIN — LACOSAMIDE 100 MG: 50 TABLET, FILM COATED ORAL at 09:03

## 2023-11-08 RX ADMIN — BUSPIRONE HYDROCHLORIDE 30 MG: 30 TABLET ORAL at 19:47

## 2023-11-08 RX ADMIN — RIVAROXABAN 20 MG: 10 TABLET, FILM COATED ORAL at 16:46

## 2023-11-08 RX ADMIN — LEVETIRACETAM 1250 MG: 750 TABLET, FILM COATED ORAL at 09:02

## 2023-11-08 RX ADMIN — LEVETIRACETAM 1250 MG: 750 TABLET, FILM COATED ORAL at 19:47

## 2023-11-08 RX ADMIN — PANTOPRAZOLE SODIUM 40 MG: 40 TABLET, DELAYED RELEASE ORAL at 09:02

## 2023-11-08 RX ADMIN — BUSPIRONE HYDROCHLORIDE 30 MG: 30 TABLET ORAL at 09:02

## 2023-11-08 RX ADMIN — AMLODIPINE BESYLATE 5 MG: 5 TABLET ORAL at 09:03

## 2023-11-08 RX ADMIN — DEXAMETHASONE 2 MG: 2 TABLET ORAL at 19:47

## 2023-11-08 RX ADMIN — SULFAMETHOXAZOLE AND TRIMETHOPRIM 1 TABLET: 800; 160 TABLET ORAL at 09:03

## 2023-11-08 RX ADMIN — FLUOXETINE 40 MG: 20 CAPSULE ORAL at 09:03

## 2023-11-08 ASSESSMENT — ACTIVITIES OF DAILY LIVING (ADL)
ADLS_ACUITY_SCORE: 67

## 2023-11-08 NOTE — PLAN OF CARE
"[id number]  /77 (BP Location: Right arm)   Pulse 73   Temp 97.7  F (36.5  C) (Axillary)   Resp 18   Ht 1.702 m (5' 7\")   Wt 69.6 kg (153 lb 7 oz)   SpO2 94%   BMI 24.03 kg/m      Afebrile, VSS>  No acute event.   Pt encouraged to use her left hand to eat breakfast, pt did well, ate the whole omelette with fork.         NEURO: Alert and oriented x4.      RESPIRATORY: Room Air, denies dizziness, and SOB.     CARDIO: VSS, denies dizziness, and extremity pain.      GI/: Denies N/V, BS active, No BM today, AUOP via purewick, container marked at 750 mL.      SKIN: Intact.      ACTIVITY: dependent.      PAIN: Denies pain.    DLA: PIV    BG: No     PLAN:       Continue monitoring.     * Italic, from provider's note.        Goal Outcome Evaluation:      Plan of Care Reviewed With: patient                 "

## 2023-11-08 NOTE — PLAN OF CARE
"/78 (BP Location: Right arm)   Pulse 70   Temp 97.9  F (36.6  C) (Axillary)   Resp 16   Ht 1.702 m (5' 7\")   Wt 69.6 kg (153 lb 7 oz)   SpO2 98%   BMI 24.03 kg/m      AVSS on RA. Disoriented to time. Up with lift device and repositioned q2hr. Spent some time up in the chair today. Denies pain and nausea. Purewick in place. Waiting for TCU placement. Continue with POC    Problem: Plan of Care - These are the overarching goals to be used throughout the patient stay.    Goal: Plan of Care Review  Description: The Plan of Care Review/Shift note should be completed every shift.  The Outcome Evaluation is a brief statement about your assessment that the patient is improving, declining, or no change.  This information will be displayed automatically on your shift note.  Outcome: Progressing     Problem: Seizure Disorder Comorbidity  Goal: Maintenance of Seizure Control  Outcome: Progressing     Problem: Hypertension Comorbidity  Goal: Blood Pressure in Desired Range  Outcome: Progressing  Intervention: Maintain Blood Pressure Management  Recent Flowsheet Documentation  Taken 11/7/2023 1515 by Capo Mcguire, RN  Medication Review/Management: medications reviewed  Taken 11/7/2023 1100 by Capo Mcguire, RN  Medication Review/Management: medications reviewed  Taken 11/7/2023 0845 by Capo Mcguire, RN  Medication Review/Management: medications reviewed     Problem: Thought Process Alteration  Goal: Optimal Thought Clarity  Outcome: Progressing     Problem: Swallowing Impairment  Goal: Optimal Eating/Swallowing without Aspiration  Outcome: Progressing     Problem: Communication Impairment  Goal: Effective Communication Skills  Outcome: Progressing     "

## 2023-11-08 NOTE — PROGRESS NOTES
[facility]    Medicine Progress Note - Hospitalist Service, GOLD TEAM 10    Date of Admission:  10/6/2023    Assessment & Plan   Olga Bailey is a 78 year old female admitted on 10/6/2023. She has history of L frontoparietal glioblastoma s/p resection, chemotherapy and radiation with remission in 2021, recently found to have recurrence in September 2023, with baseline cognitive and functional impairment, h/o seizures, h/o DVT on xarelto, HTN, and depression who presented 10/6/23 with acute worsening of baseline aphasia as well as new facial twitching. Admitted to internal medicine for further work up and management. Neurology consulted and patient found to be having partial seizures for which lacosamide was added to keppra with improvement in seizure activity. Continued radiotherapy while on [facility] but has now transferred to [place] for concomitant bevacizumab. Tolerated bevacizumab well however developed worsening headaches and tiredness at reduced dose of steroids so increased back up to 4 mg BID per rad onc. Completed 10 days of radiation on 10/25/23. Medically ready to discharge to hopefully TCU.    Discharge planning  - Medically stable for discharge pending safe discharge plan.  - RNCC/SW team working on safe discharge plan, greatly appreciate assistance. Looking at community TCU options, has been refused by  TCU multiple times.  - PM&R consulted this admission to assist with evaluation for appropriate dispo and assess rehab potential, greatly appreciate recommendations, agree with PT/OT assessments and also recommend TCU.   - Last lab check 10/31 and labs in acceptable range, no need for daily labs at this time     Partial Seizure, previously controlled  Worsened chronic aphasia   Difficulty Swallowing  Presented with acute (< 1 day) worsening of aphasia and new facial twitching with abnormal mouth movements concerning for partial seizure.  She  underwent stroke eval in the ED which was reassuring against acute CVA,   Given recent recurrence of glioblastoma, there was concern for spread or advance of disease, though both CT and MRI were reassuring to stability of current malignancy from last imaging (9/2023).  No metabolic derangements to explain acute change, and norm WBC with no focal findings was reassuring against acute infection.  Neurology consulted and felt movements were consistent with partial seizure, lacosamide was added, though subsequent EEG showed some degree of persistent seizure activity, and further brief clinical seizures were observed.  Neurologist discussed risk/benefit of a possible third antiepileptic medication (which could carry a risk of heavier sedation), the family elected to remain on the two current medications for quality of life reasons.  - Neurology consulted this admission  - Continue levetiracetam 1250 mg BID   - Lacosamide was started with 200mg IV loading dose (complete) then 100mg IV BID              - Transitioned to PO on 10/16  - Could increase to 150mg BID if increased seizure burden occurs, but has been stable on current dosing   - May reconsider third agent should seizure burden change drastically (ie Grand Mal, etc)   - Lorazepam PRN for any seizure activity > 2 minutes or increased clusters of seizure like activity      H/o L frontoparietal glioblastoma s/p resection, chemotherapy and radiation (completed May 2021), with recurrence of malignancy 2023   Cognitive and functional impairment due to glioblastoma and chemoradiation   Seen by oncology on this visit, repeat imaging stable from September. Started on trial of steroid, eval symptom improvement. Patient has developed worsening difficulty with swallowing, now eating more. Reduced decadron to 2 mg IV BID from 4 mg BID on 10/17 however patient developed worsening headache on 10/19 so dose increased.  - Oncology consulted, appreciate input - signed off, s/p  bevacizumab 10/18, 11/1; due again 11/15  - Rad/Onc consulted, RT started on 10/12 for 10 fractions - finished on 10/25  - Dexamethasone 4mg IV BID  (10/9 - 10/25) - headache resolved and no new neuro symptoms. Start tapering as of 10/26 - 2 mg BID x 2 weeks (10/26-11/9), 2 mg daily x 2 weeks (11/10-11/23), then stop.  - TMP-SMX daily for PCP PPX (pt had hx of PJP)   - PT/OT consulted, appreciate input  Rec TCU vs LTC. Family and patient hopeful for TCU - per , she was functional prior to admission and hopes to return to that level of functionality. Awaiting placement. Will need to be at a facility that will allow her to go to this infusion appointment. SLP recommends upright position and awake/alert when eating.      Likely aspiration pneumonitis: resolved  - brianna albuterol MDI q6H while awake   - DuoNeb QID PRN   - SLP consult completed, appreciate input. Soft diet level 6 with thin liquids.     Depression   - Continue PTA Buspirone and Fluoxetine      H/o DVT   - Continue PTA rivaroxaban      HTN   - Continue PTA Amlodipine      Hemorrhoids   -topical preparation H  -monitor           Diet: Snacks/Supplements Adult: Other; Send Berry Magic Cup with dinner tray and allow pt/RN to order prn also (Also likes orange if in stock. NO van or ora); With Meals  Regular Diet Adult Thin Liquids (level 0)    DVT Prophylaxis: DOAC  Martino Catheter: Not present  Lines: None     Cardiac Monitoring: None  Code Status: Full Code      Clinically Significant Risk Factors                  # Hypertension: Noted on problem list                   Disposition Plan             Stephanie Calloway MD  Hospitalist Service, GOLD TEAM 10  [facility]  Securely message with [employer] (more info)  Text page via [employer] Paging/Directory   See signed in provider for up to date coverage information  ______________________________________________________________________    Interval History   No acute  events overnight. Resting comfortably this morning.  at bedside helping patient with breakfast. She denies pain. Reports good appetite without nausea.    Physical Exam   Vital Signs: Temp: 98  F (36.7  C) Temp src: Axillary BP: (!) 142/95 Pulse: 60   Resp: 16 SpO2: 96 % O2 Device: None (Room air)    Weight: 153 lbs 7.04 oz    General Appearance: NAD. Lying comfortably in bed.  Respiratory: CTAB. No increased WOB.  Cardiovascular: RRR. No m/r/g  GI: Soft. Non-tender. Non-distended.  Skin: No rash.  Other: AOx4. Moving all extremities. Normal affect.    Medical Decision Making       35 MINUTES SPENT BY ME on the date of service doing chart review, history, exam, documentation & further activities per the note.      Data

## 2023-11-08 NOTE — PLAN OF CARE
"BP (!) 142/95 (BP Location: Right arm)   Pulse 60   Temp 98  F (36.7  C) (Axillary)   Resp 16   Ht 1.702 m (5' 7\")   Wt 69.6 kg (153 lb 7 oz)   SpO2 96%   BMI 24.03 kg/m       Afebrile. BP mildly elevated this AM, 140's/90's. OVSS on RA. Disoriented to time only. Q2hr repo's maintained. Bed alarm on for safety. Denies pain/N/V. Ate 100% dinner last evening w assistance from . Voiding well via brad wick. X1 medium formed BM this shift. No labs ordered this AM. Awaiting TCU placement. Continue w plan of care.     Problem: Plan of Care - These are the overarching goals to be used throughout the patient stay.    Goal: Plan of Care Review  Description: The Plan of Care Review/Shift note should be completed every shift.  The Outcome Evaluation is a brief statement about your assessment that the patient is improving, declining, or no change.  This information will be displayed automatically on your shift note.  Outcome: Progressing     Problem: Seizure Disorder Comorbidity  Goal: Maintenance of Seizure Control  Outcome: Progressing  Intervention: Maintain Seizure-Symptom Control  Recent Flowsheet Documentation  Taken 11/8/2023 0400 by Christa Rubalcava RN  Seizure Precautions:   activity supervised   clutter-free environment maintained   emergency equipment at bedside   soft boundaries provided  Taken 11/8/2023 0000 by Christa Rubalcava, RN  Seizure Precautions:   activity supervised   clutter-free environment maintained   emergency equipment at bedside   soft boundaries provided  Taken 11/7/2023 2000 by Christa Rubalcava, RN  Seizure Precautions:   activity supervised   clutter-free environment maintained   emergency equipment at bedside     Problem: Hypertension Comorbidity  Goal: Blood Pressure in Desired Range  Outcome: Progressing  Intervention: Maintain Blood Pressure Management  Recent Flowsheet Documentation  Taken 11/8/2023 0400 by Christa Rubalcava RN  Medication Review/Management:   " medications reviewed   high-risk medications identified  Taken 11/8/2023 0000 by Christa Rubalcava RN  Medication Review/Management:   medications reviewed   high-risk medications identified  Taken 11/7/2023 2000 by Christa Rubalcava RN  Medication Review/Management:   medications reviewed   high-risk medications identified     Problem: Thought Process Alteration  Goal: Optimal Thought Clarity  Outcome: Progressing  Intervention: Minimize Safety Risk and Altered Thought  Recent Flowsheet Documentation  Taken 11/8/2023 0400 by Christa Rubalcava RN  Enhanced Safety Measures: room near unit station  Taken 11/8/2023 0000 by Christa Rubalcava RN  Enhanced Safety Measures: room near unit station  Taken 11/7/2023 2000 by Christa Rubalcava RN  Sensory Stimulation Regulation:   care clustered   lighting decreased   quiet environment promoted  Enhanced Safety Measures: room near unit station     Problem: Swallowing Impairment  Goal: Optimal Eating/Swallowing without Aspiration  Outcome: Progressing  Intervention: Optimize Eating and Swallowing  Recent Flowsheet Documentation  Taken 11/8/2023 0400 by Christa Rubalcava RN  Aspiration Precautions:   awake/alert before oral intake   distractions minimized during oral intake   oral hygiene care promoted   upright posture maintained  Taken 11/8/2023 0000 by Christa Rubalcava RN  Aspiration Precautions:   awake/alert before oral intake   distractions minimized during oral intake   oral hygiene care promoted   upright posture maintained  Taken 11/7/2023 2000 by Christa Rubalcava RN  Aspiration Precautions:   awake/alert before oral intake   distractions minimized during oral intake   oral hygiene care promoted   upright posture maintained  Feeding/Eating Techniques:   feeding assistance provided   rest periods provided     Problem: Communication Impairment  Goal: Effective Communication Skills  Outcome: Progressing  Intervention: Optimize Communication  Skills  Recent Flowsheet Documentation  Taken 11/7/2023 2000 by Christa Rubalcava RN  Communication Enhancement Strategies: extra time allowed for response   Goal Outcome Evaluation:

## 2023-11-08 NOTE — PROGRESS NOTES
Care Management Follow Up    Length of Stay (days): 32    Expected Discharge Date:       Concerns to be Addressed: discharge planning     Patient plan of care discussed at interdisciplinary rounds: Yes    Anticipated Discharge Disposition: Transitional Care, Long Term Care, Home Care, Assisted Living  Georgia Mendoza  [address]  [place], [place] [address] [phone] (Alana RN)    Anticipated Discharge Services:       [facility]  [address]  [place] [address]  [phone]     Anticipated Discharge DME:  (TBD)    Patient/family educated on Medicare website which has current facility and service quality ratings: yes  Education Provided on the Discharge Plan: Yes  Patient/Family in Agreement with the Plan: yes    Referrals Placed by CM/SW: Post Acute Facilities  Private pay costs discussed: private room/amenity fees    -[facility], [place]  -[facility] [place]  -[facility]. [facility]  -[facility]  -[facility], [facility]  -[facility], [place]    DECLINED:   [facility] - TCU:   11/3 & 11/8: Declined: per [facility] Leadership  She will be a longer stay and higher acuity than we are able to manage. She should be able to go to a community TCU as the TCU is not responsible for the chemo cost.  -- [facility], assessed and declined as pt is a feeder and from a staffing perspective, they cannot provide this level of care  -10/31: [facility]: SW resent the request.  - Declined: they have to many acuity pt's and can't take pt on. They feel pt is more suited for a LTC facility  - [facility], assessed and declined for admit stating that pt's acuity level is to high and they cannot meet pt's needs  - [facility], is full with a lengthy wait list  - [facility], assessed and declined indicating that pt's acuity is to high and cannot meet psychosocial needs  - [facility],  [facility], pt assessed and declined for  admit as they cannot meet pt's needs, acuity is to high and they do not have an available room with a overhead lift.  - [facility], they are not accepting any admit due to staffing.  -[facility]icia Villanueva - acuity to [facility]  -[facility] - no beds   -Felicia Morrow - Acuity to high  -[facility], [place]- Acuity to high      Additional Information:  CAMRON spoke with Maribell PERSAUD CC Manager. Maribell reached out to [facility] leadership about pt's husbands request for rehab at [facility]. Leadership replied back to her and they are denying pt because she will be a longer rehab then they can handle. Maribell will reach out to pt's .   CAMRON sent more referrals and updated the charge and MD. PT is being escalated to SWAT tomorrow.     CAMRON faxed PT/OT , MD, RN notes, and scheduled MAR.   Alana will review tomorrow    SW to follow and assist with any other discharge needs that may arise.  DAYNA Peres   5A beds:5220-40  5C beds 5417-32 (no BMT pt's)     Phone: [phone]  Pager: [phone]

## 2023-11-09 ENCOUNTER — APPOINTMENT (OUTPATIENT)
Dept: PHYSICAL THERAPY | Facility: CLINIC | Age: 78
DRG: 054 | End: 2023-11-09
Attending: INTERNAL MEDICINE
Payer: MEDICARE

## 2023-11-09 ENCOUNTER — APPOINTMENT (OUTPATIENT)
Dept: OCCUPATIONAL THERAPY | Facility: CLINIC | Age: 78
DRG: 054 | End: 2023-11-09
Attending: INTERNAL MEDICINE
Payer: MEDICARE

## 2023-11-09 PROCEDURE — 97530 THERAPEUTIC ACTIVITIES: CPT | Mod: GO

## 2023-11-09 PROCEDURE — 250N000013 HC RX MED GY IP 250 OP 250 PS 637: Performed by: STUDENT IN AN ORGANIZED HEALTH CARE EDUCATION/TRAINING PROGRAM

## 2023-11-09 PROCEDURE — 250N000013 HC RX MED GY IP 250 OP 250 PS 637: Performed by: INTERNAL MEDICINE

## 2023-11-09 PROCEDURE — 97530 THERAPEUTIC ACTIVITIES: CPT | Mod: GP

## 2023-11-09 PROCEDURE — 99232 SBSQ HOSP IP/OBS MODERATE 35: CPT | Performed by: STUDENT IN AN ORGANIZED HEALTH CARE EDUCATION/TRAINING PROGRAM

## 2023-11-09 PROCEDURE — 120N000005 HC R&B MS OVERFLOW UMMC

## 2023-11-09 PROCEDURE — 250N000012 HC RX MED GY IP 250 OP 636 PS 637: Performed by: STUDENT IN AN ORGANIZED HEALTH CARE EDUCATION/TRAINING PROGRAM

## 2023-11-09 RX ADMIN — SULFAMETHOXAZOLE AND TRIMETHOPRIM 1 TABLET: 800; 160 TABLET ORAL at 09:01

## 2023-11-09 RX ADMIN — AMLODIPINE BESYLATE 5 MG: 5 TABLET ORAL at 09:02

## 2023-11-09 RX ADMIN — LACOSAMIDE 100 MG: 50 TABLET, FILM COATED ORAL at 19:50

## 2023-11-09 RX ADMIN — ALBUTEROL SULFATE 2 PUFF: 90 AEROSOL, METERED RESPIRATORY (INHALATION) at 19:51

## 2023-11-09 RX ADMIN — DEXAMETHASONE 2 MG: 2 TABLET ORAL at 09:02

## 2023-11-09 RX ADMIN — PANTOPRAZOLE SODIUM 40 MG: 40 TABLET, DELAYED RELEASE ORAL at 16:47

## 2023-11-09 RX ADMIN — LEVETIRACETAM 1250 MG: 750 TABLET, FILM COATED ORAL at 09:01

## 2023-11-09 RX ADMIN — THERA TABS 1 TABLET: TAB at 09:01

## 2023-11-09 RX ADMIN — RIVAROXABAN 20 MG: 10 TABLET, FILM COATED ORAL at 16:47

## 2023-11-09 RX ADMIN — FLUOXETINE 40 MG: 20 CAPSULE ORAL at 09:02

## 2023-11-09 RX ADMIN — BUSPIRONE HYDROCHLORIDE 30 MG: 30 TABLET ORAL at 19:50

## 2023-11-09 RX ADMIN — PANTOPRAZOLE SODIUM 40 MG: 40 TABLET, DELAYED RELEASE ORAL at 09:02

## 2023-11-09 RX ADMIN — DEXAMETHASONE 2 MG: 2 TABLET ORAL at 19:51

## 2023-11-09 RX ADMIN — BUSPIRONE HYDROCHLORIDE 30 MG: 30 TABLET ORAL at 09:00

## 2023-11-09 RX ADMIN — ALBUTEROL SULFATE 2 PUFF: 90 AEROSOL, METERED RESPIRATORY (INHALATION) at 01:19

## 2023-11-09 RX ADMIN — ALBUTEROL SULFATE 2 PUFF: 90 AEROSOL, METERED RESPIRATORY (INHALATION) at 09:03

## 2023-11-09 RX ADMIN — LACOSAMIDE 100 MG: 50 TABLET, FILM COATED ORAL at 09:02

## 2023-11-09 RX ADMIN — ALBUTEROL SULFATE 2 PUFF: 90 AEROSOL, METERED RESPIRATORY (INHALATION) at 14:19

## 2023-11-09 RX ADMIN — LEVETIRACETAM 1250 MG: 750 TABLET, FILM COATED ORAL at 19:50

## 2023-11-09 ASSESSMENT — ACTIVITIES OF DAILY LIVING (ADL)
ADLS_ACUITY_SCORE: 67
ADLS_ACUITY_SCORE: 67
ADLS_ACUITY_SCORE: 63
ADLS_ACUITY_SCORE: 66
ADLS_ACUITY_SCORE: 67
ADLS_ACUITY_SCORE: 66
ADLS_ACUITY_SCORE: 67
ADLS_ACUITY_SCORE: 67
ADLS_ACUITY_SCORE: 66
ADLS_ACUITY_SCORE: 65
ADLS_ACUITY_SCORE: 62
ADLS_ACUITY_SCORE: 65

## 2023-11-09 NOTE — PLAN OF CARE
VSS. Automatic blood pressure cuff was reading poorly, manual recheck showed BP was WNL. Denies pain and nausea. No labs ordered this am. Next turn at 0800.  will not be here until the afternoon today, so she may need osme extra staff help with feeding ect.     Problem: Plan of Care - These are the overarching goals to be used throughout the patient stay.    Goal: Absence of Hospital-Acquired Illness or Injury  Intervention: Identify and Manage Fall Risk  Recent Flowsheet Documentation  Taken 11/9/2023 0000 by Stephanie Lopez RN  Safety Promotion/Fall Prevention: safety round/check completed  Intervention: Prevent Skin Injury  Recent Flowsheet Documentation  Taken 11/9/2023 0555 by Stephanie Lopez RN  Body Position:   turned   right  Taken 11/9/2023 0400 by Stephanie Lopez RN  Body Position:   left   turned  Taken 11/9/2023 0200 by Stephanie Lopez RN  Body Position:   right   turned  Taken 11/9/2023 0000 by Stephanie Lopez RN  Body Position:   turned   left  Taken 11/8/2023 2200 by Stephanie Lopez RN  Body Position:   turned   right  Taken 11/8/2023 1945 by Stephanie Lopez RN  Body Position:   turned   left     Problem: Seizure Disorder Comorbidity  Goal: Maintenance of Seizure Control  Intervention: Maintain Seizure-Symptom Control  Recent Flowsheet Documentation  Taken 11/9/2023 0000 by Stephanie Lopez RN  Seizure Precautions:   activity supervised   clutter-free environment maintained   emergency equipment at bedside     Problem: Hypertension Comorbidity  Goal: Blood Pressure in Desired Range  Intervention: Maintain Blood Pressure Management  Recent Flowsheet Documentation  Taken 11/9/2023 0000 by Stephanie Lopez RN  Medication Review/Management: medications reviewed     Problem: Thought Process Alteration  Goal: Optimal Thought Clarity  Intervention: Minimize Safety Risk and Altered Thought  Recent Flowsheet Documentation  Taken 11/9/2023 0000 by  Stephanie Lopez, RN  Sensory Stimulation Regulation:   lighting decreased   care clustered   visual stimulation minimized  Enhanced Safety Measures: room near unit station     Problem: Communication Impairment  Goal: Effective Communication Skills  Intervention: Optimize Communication Skills  Recent Flowsheet Documentation  Taken 11/9/2023 0000 by Stephanie Lopez, RN  Communication Enhancement Strategies:   call light answered in person   verbal communication attempts encouraged   Goal Outcome Evaluation:

## 2023-11-09 NOTE — PROGRESS NOTES
Care Management Follow Up    Length of Stay (days): 33    Expected Discharge Date:       Concerns to be Addressed: discharge planning     Patient plan of care discussed at interdisciplinary rounds: Yes    Anticipated Discharge Disposition: Transitional Care, Long Term Care, Home Care, Assisted Living  Georgia Mendoza  [address]  [place], [place] [address] [phone] (Alana RN)     Anticipated Discharge Services:        [facility]  [address]  [place] [address]  [phone]      Anticipated Discharge DME:  (TBD)    Patient/family educated on Medicare website which has current facility and service quality ratings: yes  Education Provided on the Discharge Plan: Yes  Patient/Family in Agreement with the Plan: yes    Referrals Placed by CM/SW: Post Acute Facilities  Private pay costs discussed: private room/amenity fees    -[facility], [facility], [facility]. [facility]       DECLINED:   [facility] - TCU:   11/3 & 11/8: Declined: per [facility] Leadership  She will be a longer stay and higher acuity than we are able to manage. She should be able to go to a community TCU as the TCU is not responsible for the chemo cost.  -- [facility], assessed and declined as pt is a feeder and from a staffing perspective, they cannot provide this level of care  -10/31: [facility]: SW resent the request.  - Declined: they have to many acuity pt's and can't take pt on. They feel pt is more suited for a LTC facility  - [facility], assessed and declined for admit stating that pt's acuity level is to high and they cannot meet pt's needs  - [facility], is full with a lengthy wait list  - [facility], assessed and declined indicating that pt's acuity is to high and cannot meet psychosocial needs  - [facility] Leblanc,  [facility] Solano, pt assessed and declined for admit as they cannot meet pt's  needs, acuity is to high and they do not have an available room with a overhead lift.  - [facility] Feliciano, they are not accepting any admit due to staffing.  -[facility] Fel[facility] [place] - acuity to high  -[facility], [place] - no beds   -[facility] [place] - Acuity to high  -[facility] [place]- [facility]uity to [facility]  -[facility] - facility full  -[facility], [place] - Acuity to high, complex medical, cost of meds  -[facility] - insurance and staffing    Additional Information:  SW sent pt information to SWAT   2:20 pmSW spoke with Maribell Dominguez with SWAT during the SWAT meeting it was suggested to call patient's assisted living to see if the family can pay extra for additional services that would help with the two-person assist.  It was also suggested that SW speak with patient again about LTC.    2: 30 pm CAMRON spoke with RN Alana at assisted living.  Patient is at [facility] assist at their facility. The patient would need to have an outside agency come in for assistance.  The outside agencies are not allowed to assist with a 2 person assist and or use their lifts.  Alana said that most patients who have outside agencies use them for  and not mobility assist.  But the outside agency could assist with helping patient eat.  Alana also said that before patient could return to their assisted living home she would need to come in and do another assessment to see if they could provide for patient's needs.  CAMRON [facility].  Alana said that she will talk with the admissions coordinator about [facility]  CAMRON let her know that the [facility] had declined her due to acuity.    2:45 pm [facility]RON [facility]. Asking for a call back about LTC beds     3:15 pm:  met with patient and her daughter at bedside.   updated them on this SWAT  meeting that took place this afternoon.  CAMRON explained that an outside agency could only assist as a PCA at the assisted living due to laws not allowing the outside agency to help with lifting patient and or using their lifts.  SW talked about [facility] long-term care possibilities.  When SW asked patient what she wants.  Olga said she wants to get out of the hospital.   Patient's  came in at this time.  Social work went over the information with him about the [facility] meeting.  He shared his frustration with how the decision was made for -[facility] not to accept patient.  He feels like they are not using a scientific method to weigh how patient is improving.  Patient's  [facility].  SW will call the admissions coordinator at [facility] and ask for assistance with putting in referrals at [facility] in [place] because this is the closest facility to their home.  SW asked that patient's  set an appointment to tour the long-term care facilities that may be able to accept patient.  Patient's  said that he will tour them before excepting the facilities for placement.  SW let patient know that she will work diligently on finding a placement so that she can leave the hospital.  SW excused herself and left the room    SW to follow and assist with any other discharge needs that may arise.  DAYNA Peres   5A beds:5220-40  5C beds 3117-32 (no BMT pt's)     Phone: [phone]  Pager: [phone]

## 2023-11-09 NOTE — PLAN OF CARE
Olga has been able to feed herself bites of food with a fork using her left hand after we set up her tray.  She is also able to take drinks of things with a straw independently.  No confusion with mild aphasia unchanged.  Pleasant and cooperative.  No family is present yet today.  Skin looks good, no redness noted under her preventative sacral mepilex, currently sitting up in a chair.  Waiting on rehab placement.      Problem: Hypertension Comorbidity  Goal: Blood Pressure in Desired Range  Outcome: Progressing     Problem: Thought Process Alteration  Goal: Optimal Thought Clarity  Outcome: Progressing     Problem: Swallowing Impairment  Goal: Optimal Eating/Swallowing without Aspiration  Outcome: Progressing  Intervention: Optimize Eating and Swallowing  Recent Flowsheet Documentation  Taken 11/9/2023 0900 by Nafisa Wallace, RN  Swallowing Interventions: Dysphagia:   monitored for cough during intake   small bites/sips encouraged   upright position maintained 30 mins after intake  Feeding/Eating Techniques: close supervision provided     Problem: Communication Impairment  Goal: Effective Communication Skills  Outcome: Progressing  Intervention: Optimize Communication Skills  Recent Flowsheet Documentation  Taken 11/9/2023 0900 by Nafisa Wallace, RN  Communication Enhancement Strategies: call light answered in person   Goal Outcome Evaluation:

## 2023-11-09 NOTE — PROGRESS NOTES
[facility]    Medicine Progress Note - Hospitalist Service, GOLD TEAM 10    Date of Admission:  10/6/2023    Assessment & Plan   Olga Bailey is a 78 year old female admitted on 10/6/2023. She has history of L frontoparietal glioblastoma s/p resection, chemotherapy and radiation with remission in 2021, recently found to have recurrence in September 2023, with baseline cognitive and functional impairment, h/o seizures, h/o DVT on xarelto, HTN, and depression who presented 10/6/23 with acute worsening of baseline aphasia as well as new facial twitching. Admitted to internal medicine for further work up and management. Neurology consulted and patient found to be having partial seizures for which lacosamide was added to keppra with improvement in seizure activity. Continued radiotherapy while on [facility] but has now transferred to [facility] for concomitant bevacizumab. Tolerated bevacizumab well however developed worsening headaches and tiredness at reduced dose of steroids so increased back up to 4 mg BID per rad onc. Completed 10 days of radiation on 10/25/23. Medically ready to discharge to hopefully TCU.    Discharge planning  - Medically stable for discharge pending safe discharge plan.  - 11/9 - being discussed at weekly SWAT meeting due to difficult discharge.  - RNCC/SW team working on safe discharge plan, greatly appreciate assistance. Looking at community TCU options, has been refused by  TCU multiple times.  - PM&R consulted this admission to assist with evaluation for appropriate dispo and assess rehab potential, greatly appreciate recommendations, agree with PT/OT assessments and also recommend TCU.   - Last lab check 10/31 and labs in acceptable range, no need for daily labs at this time     Partial Seizure, previously controlled  Worsened chronic aphasia   Difficulty Swallowing  Presented with acute (< 1 day) worsening of aphasia and new facial  twitching with abnormal mouth movements concerning for partial seizure.  She underwent stroke eval in the ED which was reassuring against acute CVA,   Given recent recurrence of glioblastoma, there was concern for spread or advance of disease, though both CT and MRI were reassuring to stability of current malignancy from last imaging (9/2023).  No metabolic derangements to explain acute change, and norm WBC with no focal findings was reassuring against acute infection.  Neurology consulted and felt movements were consistent with partial seizure, lacosamide was added, though subsequent EEG showed some degree of persistent seizure activity, and further brief clinical seizures were observed.  Neurologist discussed risk/benefit of a possible third antiepileptic medication (which could carry a risk of heavier sedation), the family elected to remain on the two current medications for quality of life reasons.  - Neurology consulted this admission  - Continue levetiracetam 1250 mg BID   - Lacosamide was started with 200mg IV loading dose (complete) then 100mg IV BID              - Transitioned to PO on 10/16  - Could increase to 150mg BID if increased seizure burden occurs, but has been stable on current dosing   - May reconsider third agent should seizure burden change drastically (ie Grand Mal, etc)   - Lorazepam PRN for any seizure activity > 2 minutes or increased clusters of seizure like activity      H/o L frontoparietal glioblastoma s/p resection, chemotherapy and radiation (completed May 2021), with recurrence of malignancy 2023   Cognitive and functional impairment due to glioblastoma and chemoradiation   Seen by oncology on this visit, repeat imaging stable from September. Started on trial of steroid, eval symptom improvement. Patient has developed worsening difficulty with swallowing, now eating more. Reduced decadron to 2 mg IV BID from 4 mg BID on 10/17 however patient developed worsening headache on 10/19 so  dose increased.  - Oncology consulted, appreciate input - signed off, s/p bevacizumab 10/18, 11/1; due again 11/15  - Rad/Onc consulted, RT started on 10/12 for 10 fractions - finished on 10/25  - Dexamethasone 4mg IV BID  (10/9 - 10/25) - headache resolved and no new neuro symptoms. Start tapering as of 10/26 - 2 mg BID x 2 weeks (10/26-11/9), 2 mg daily x 2 weeks (11/10-11/23), then stop.  - TMP-SMX daily for PCP PPX (pt had hx of PJP)   - PT/OT consulted, appreciate input  Rec TCU vs LTC. Family and patient hopeful for TCU - per , she was functional prior to admission and hopes to return to that level of functionality. Awaiting placement. Will need to be at a facility that will allow her to go to this infusion appointment. SLP recommends upright position and awake/alert when eating.      Likely aspiration pneumonitis: resolved  - brianna albuterol MDI q6H while awake   - DuoNeb QID PRN   - SLP consult completed, appreciate input. Soft diet level 6 with thin liquids.     Depression   - Continue PTA Buspirone and Fluoxetine      H/o DVT   - Continue PTA rivaroxaban      HTN   - Continue PTA Amlodipine      Hemorrhoids   -topical preparation H  -monitor           Diet: Snacks/Supplements Adult: Other; Send Berry Magic Cup with dinner tray and allow pt/RN to order prn also (Also likes orange if in stock. NO van or ora); With Meals  Regular Diet Adult Thin Liquids (level 0)    DVT Prophylaxis: DOAC  Martino Catheter: Not present  Lines: None     Cardiac Monitoring: None  Code Status: Full Code      Clinically Significant Risk Factors                  # Hypertension: Noted on problem list                   Disposition Plan             Stephanie Callwoay MD  Hospitalist Service, GOLD TEAM 10  [facility]  Securely message with [employer] (more info)  Text page via [employer] Paging/Directory   See signed in provider for up to date coverage  information  ______________________________________________________________________    Interval History   No acute events overnight. Feeling well and resting this morning. Denies any abdominal pain or nausea. Appetite fair.    Physical Exam   Vital Signs: Temp: 96.9  F (36.1  C) Temp src: Axillary BP: (!) 134/109 Pulse: 64   Resp: 16 SpO2: 96 % O2 Device: None (Room air)    Weight: 153 lbs 7.04 oz    General Appearance: NAD. Lying comfortably in bed.  Respiratory: CTAB. No increased WOB.  Cardiovascular: RRR. No m/r/g  GI: Soft. Non-tender. Non-distended.  Skin: No rash.  Other: AOx4. Moving all extremities. Normal affect.    Medical Decision Making       35 MINUTES SPENT BY ME on the date of service doing chart review, history, exam, documentation & further activities per the note.      Data

## 2023-11-09 NOTE — PLAN OF CARE
Temp: 98  F (36.7  C) Temp src: Oral BP: 132/88 Pulse: 71   Resp: 16 SpO2: 95 % O2 Device: None (Room air)  .      [id number]:  AVSS.  Pt pleasant and able to answer questions appropriately with short words/sentences.  Pt denies pain, nausea, SOB, or constipation.  Pt was sitting in chair, but no purewick in at beginning of shift.  Pt incontinent of urine and stool.  Bed bath and hair washed.   at bedside and ordered dinner.  Able to swallow pills in applesauce.  Up with lift, or with 2 heavy assist and GB.  Awaiting TCU placement.        Problem: Communication Impairment  Goal: Effective Communication Skills  Outcome: Progressing  Intervention: Optimize Communication Skills  Recent Flowsheet Documentation  Taken 11/9/2023 1700 by Ruth Woo RN  Communication Enhancement Strategies: call light answered in person     Problem: Swallowing Impairment  Goal: Optimal Eating/Swallowing without Aspiration  Outcome: Progressing  Intervention: Optimize Eating and Swallowing  Recent Flowsheet Documentation  Taken 11/9/2023 1700 by Ruth Woo RN  Aspiration Precautions:   awake/alert before oral intake   upright posture maintained   distractions minimized during oral intake  Swallowing Interventions: Dysphagia:   monitored for cough during intake   small bites/sips encouraged   upright position maintained 30 mins after intake  Feeding/Eating Techniques: close supervision provided     Problem: Thought Process Alteration  Goal: Optimal Thought Clarity  Outcome: Progressing  Intervention: Minimize Safety Risk and Altered Thought  Recent Flowsheet Documentation  Taken 11/9/2023 1700 by Ruth Woo RN  Enhanced Safety Measures: room near unit station     Problem: Hypertension Comorbidity  Goal: Blood Pressure in Desired Range  Outcome: Progressing  Intervention: Maintain Blood Pressure Management  Recent Flowsheet Documentation  Taken 11/9/2023 1700 by Ruth Woo RN  Medication Review/Management: medications  reviewed     Problem: Seizure Disorder Comorbidity  Goal: Maintenance of Seizure Control  Outcome: Progressing  Intervention: Maintain Seizure-Symptom Control  Recent Flowsheet Documentation  Taken 11/9/2023 1700 by Ruth Woo RN  Seizure Precautions:   activity supervised   clutter-free environment maintained     Problem: Plan of Care - These are the overarching goals to be used throughout the patient stay.    Goal: Plan of Care Review  Description: The Plan of Care Review/Shift note should be completed every shift.  The Outcome Evaluation is a brief statement about your assessment that the patient is improving, declining, or no change.  This information will be displayed automatically on your shift note.  Outcome: Progressing  Goal: Absence of Hospital-Acquired Illness or Injury  Intervention: Identify and Manage Fall Risk  Recent Flowsheet Documentation  Taken 11/9/2023 1700 by Ruth Woo RN  Safety Promotion/Fall Prevention:   safety round/check completed   clutter free environment maintained  Intervention: Prevent Skin Injury  Recent Flowsheet Documentation  Taken 11/9/2023 1700 by Ruth Woo, RN  Body Position:   log-rolled   turned   left   heels elevated

## 2023-11-09 NOTE — PROGRESS NOTES
Care Management Follow Up     Length of Stay (days): 33     Expected Discharge Date:  TBD     Concerns to be Addressed: discharge planning     Patient plan of care discussed at interdisciplinary rounds: Yes     Anticipated Discharge Disposition: Transitional Care, Long Term Care, Home Care, Assisted Living     Anticipated Discharge Services:    Anticipated Discharge DME:  (TBD)     Patient/family educated on Medicare website which has current facility and service quality ratings: yes  Education Provided on the Discharge Plan: Yes  Patient/Family in Agreement with the Plan: yes     Referrals Placed by CM/SW: Post Acute Facilities  Private pay costs discussed: private room/amenity fees     [facility]   [phone]  11/9: Left a voicemail for admissions to call back with updates on referral    [facility]  [phone] (Direct Admissions phone)  11/9: Will not have any openings until Monday 11/13, but asked for CHW to call back tomorrow as they have yet to review referral.     [facility]  [phone]  11/9: Admissions called SW to say that they still need to review patients referral and will call back with their decision    [facility]  [phone]  11/9: Left a voicemail for admissions to call back with updates on referral     DECLINED:   [facility] - TCU:   11/3 & 11/8: Declined: per [facility]  She will be a longer stay and higher acuity than we are able to manage. She should be able to go to a community TCU as the TCU is not responsible for the chemo cost.  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: SW resent the request.  - Declined: they have to many acuity pt's and can't take pt on. They feel pt is more suited for a LTC facility  - [facility], assessed and declined for admit stating that pt's acuity level is to  high and they cannot meet pt's needs  - [facility], is full with a lengthy wait list  - [facility], assessed and declined indicating that pt's acuity is to high and cannot meet psychosocial needs  - [facility], pt assessed and declined for admit as they cannot meet pt's needs, acuity is to high and they do not have an available room with a overhead lift.  - [facility], they are not accepting any admit due to staffing.  -[facility] - acuity to high  -[facility] - no beds   -[facility] - [facility] to high  -[facility]- Acuity to high  -[facility] - facility full  [facility] - Acuity to high, complex medical, cost of meds  [facility] - insurance and staffing     Additional Information:    CHW provides update on follow-ups above. CHW will continue to follow up.    [email or web address]  [phone]

## 2023-11-10 ENCOUNTER — APPOINTMENT (OUTPATIENT)
Dept: PHYSICAL THERAPY | Facility: CLINIC | Age: 78
DRG: 054 | End: 2023-11-10
Attending: INTERNAL MEDICINE
Payer: MEDICARE

## 2023-11-10 ENCOUNTER — APPOINTMENT (OUTPATIENT)
Dept: OCCUPATIONAL THERAPY | Facility: CLINIC | Age: 78
DRG: 054 | End: 2023-11-10
Attending: INTERNAL MEDICINE
Payer: MEDICARE

## 2023-11-10 PROCEDURE — 97530 THERAPEUTIC ACTIVITIES: CPT | Mod: GP | Performed by: PHYSICAL THERAPIST

## 2023-11-10 PROCEDURE — 99232 SBSQ HOSP IP/OBS MODERATE 35: CPT | Performed by: STUDENT IN AN ORGANIZED HEALTH CARE EDUCATION/TRAINING PROGRAM

## 2023-11-10 PROCEDURE — 250N000013 HC RX MED GY IP 250 OP 250 PS 637: Performed by: STUDENT IN AN ORGANIZED HEALTH CARE EDUCATION/TRAINING PROGRAM

## 2023-11-10 PROCEDURE — 120N000005 HC R&B MS OVERFLOW UMMC

## 2023-11-10 PROCEDURE — 250N000012 HC RX MED GY IP 250 OP 636 PS 637: Performed by: STUDENT IN AN ORGANIZED HEALTH CARE EDUCATION/TRAINING PROGRAM

## 2023-11-10 PROCEDURE — 97530 THERAPEUTIC ACTIVITIES: CPT | Mod: GO

## 2023-11-10 PROCEDURE — 97535 SELF CARE MNGMENT TRAINING: CPT | Mod: GO

## 2023-11-10 PROCEDURE — 250N000013 HC RX MED GY IP 250 OP 250 PS 637: Performed by: INTERNAL MEDICINE

## 2023-11-10 RX ORDER — DEXAMETHASONE 2 MG/1
2 TABLET ORAL DAILY
Status: COMPLETED | OUTPATIENT
Start: 2023-11-11 | End: 2023-11-23

## 2023-11-10 RX ORDER — DEXAMETHASONE 2 MG/1
2 TABLET ORAL DAILY
Status: DISCONTINUED | OUTPATIENT
Start: 2023-11-11 | End: 2023-11-10

## 2023-11-10 RX ADMIN — FLUOXETINE 40 MG: 20 CAPSULE ORAL at 09:25

## 2023-11-10 RX ADMIN — DEXAMETHASONE 2 MG: 2 TABLET ORAL at 09:24

## 2023-11-10 RX ADMIN — ALBUTEROL SULFATE 2 PUFF: 90 AEROSOL, METERED RESPIRATORY (INHALATION) at 19:49

## 2023-11-10 RX ADMIN — LACOSAMIDE 100 MG: 50 TABLET, FILM COATED ORAL at 09:26

## 2023-11-10 RX ADMIN — PANTOPRAZOLE SODIUM 40 MG: 40 TABLET, DELAYED RELEASE ORAL at 17:12

## 2023-11-10 RX ADMIN — LACOSAMIDE 100 MG: 50 TABLET, FILM COATED ORAL at 19:41

## 2023-11-10 RX ADMIN — LEVETIRACETAM 1250 MG: 750 TABLET, FILM COATED ORAL at 19:43

## 2023-11-10 RX ADMIN — LEVETIRACETAM 1250 MG: 750 TABLET, FILM COATED ORAL at 09:23

## 2023-11-10 RX ADMIN — AMLODIPINE BESYLATE 5 MG: 5 TABLET ORAL at 09:26

## 2023-11-10 RX ADMIN — ALBUTEROL SULFATE 2 PUFF: 90 AEROSOL, METERED RESPIRATORY (INHALATION) at 09:43

## 2023-11-10 RX ADMIN — PANTOPRAZOLE SODIUM 40 MG: 40 TABLET, DELAYED RELEASE ORAL at 09:24

## 2023-11-10 RX ADMIN — ALBUTEROL SULFATE 2 PUFF: 90 AEROSOL, METERED RESPIRATORY (INHALATION) at 14:09

## 2023-11-10 RX ADMIN — BUSPIRONE HYDROCHLORIDE 30 MG: 30 TABLET ORAL at 19:41

## 2023-11-10 RX ADMIN — BUSPIRONE HYDROCHLORIDE 30 MG: 30 TABLET ORAL at 09:25

## 2023-11-10 RX ADMIN — THERA TABS 1 TABLET: TAB at 09:26

## 2023-11-10 RX ADMIN — SULFAMETHOXAZOLE AND TRIMETHOPRIM 1 TABLET: 800; 160 TABLET ORAL at 09:25

## 2023-11-10 RX ADMIN — RIVAROXABAN 20 MG: 10 TABLET, FILM COATED ORAL at 17:12

## 2023-11-10 ASSESSMENT — ACTIVITIES OF DAILY LIVING (ADL)
ADLS_ACUITY_SCORE: 63
ADLS_ACUITY_SCORE: 63
ADLS_ACUITY_SCORE: 66
ADLS_ACUITY_SCORE: 66
ADLS_ACUITY_SCORE: 67
ADLS_ACUITY_SCORE: 63
ADLS_ACUITY_SCORE: 66
ADLS_ACUITY_SCORE: 63
ADLS_ACUITY_SCORE: 67
ADLS_ACUITY_SCORE: 66
ADLS_ACUITY_SCORE: 65
ADLS_ACUITY_SCORE: 66

## 2023-11-10 NOTE — PROGRESS NOTES
Care Management Follow Up    Length of Stay (days): 34    Expected Discharge Date:       Concerns to be Addressed: discharge planning     Patient plan of care discussed at interdisciplinary rounds: No    Anticipated Discharge Disposition: Transitional Care, Long Term Care, Home Care, Assisted Living  [facility]  [address] [place] [address]  [phone] (Alana RN)     Anticipated Discharge Services:        [facility]  [address]  [place] [address]  [phone]         Anticipated Discharge DME:  (TBD)    Patient/family educated on Medicare website which has current facility and service quality ratings: yes  Education Provided on the Discharge Plan: Yes  Patient/Family in Agreement with the Plan: yes    Referrals Placed by CM/SW: Post Acute Facilities  Private pay costs discussed: private room/amenity fees    LTC referral:     [facility]  [place]: [phone]  Fax: [phone]  10/11: CAMRON sent referral via communication in Epic to [phone]      Additional Information:  CAMRON received a call from [facility] social workers with [facility]. They have an opening in their long-term care next week.  CAMRON called [facility] and left a message with their  asking if they could help assist with sending referrals out to the [facility] for the patient.  Patient's family would prefer the [facility] due to it being much closer than the [facility]    CAMRON spoke with admissions at [facility].  CAMRON let her know that [facility] has an opening next week for long-term care.  She told  that she would call affiliated [facility] and put in a good work for Olga and to find out if they have any openings.  She did state that it is really hard to get residents into the [facility] facility.    CAMRON met with patient and her  at chair side.   SW let them know that there is a possible opening  in the long-term care facility at [facility] in [place].  They would like  to put in a referral.  Patient's  is concerned about how much PT\OT patient will be receiving at the facility.  SW said that she will talk with the facility about their PT\OT.  CAMRON sent a referral to [facility].  CAMRON included in the referral that patient is wanting to continue with PT\OT and is very determined to return to her baseline.  CAMRON will follow up with the facility if they are thinking about excepting her about PT\OT.  CAMRON called Ellyn back at [facility] letting her know that she sent in the referral for the patient and asked her to review.    SW to follow and assist with any other discharge needs that may arise.    DAYNA Peres   5A beds:5220-40  5C beds 2217-32 (no BMT pt's)     Phone: [phone]  Pager: [phone]

## 2023-11-10 NOTE — PROGRESS NOTES
VS baseline, no pain was expressed, helped transfer to chair and fed breakfast had good oral intake and medication per ordered, incontinent of urine, no stool was noted continue to be monitor

## 2023-11-10 NOTE — PROGRESS NOTES
[facility]    Medicine Progress Note - Hospitalist Service, GOLD TEAM 10    Date of Admission:  10/6/2023    Assessment & Plan   Olga Bailey is a 78 year old female admitted on 10/6/2023. She has history of L frontoparietal glioblastoma s/p resection, chemotherapy and radiation with remission in 2021, recently found to have recurrence in September 2023, with baseline cognitive and functional impairment, h/o seizures, h/o DVT on xarelto, HTN, and depression who presented 10/6/23 with acute worsening of baseline aphasia as well as new facial twitching. Admitted to internal medicine for further work up and management. Neurology consulted and patient found to be having partial seizures for which lacosamide was added to keppra with improvement in seizure activity. Continued radiotherapy while on [facility] but has now transferred to [place] for concomitant bevacizumab. Tolerated bevacizumab well however developed worsening headaches and tiredness at reduced dose of steroids so increased back up to 4 mg BID per rad onc. Completed 10 days of radiation on 10/25/23. Medically ready to discharge to [facility].    Changes Today:  - Tapered dexamethasone to daily from BID per taper plan.  - Appreciate SW assistance with LTC placement    Discharge planning  - Medically stable for discharge pending safe discharge plan.  - 11/9 - discussed at weekly SWAT meeting due to difficult discharge.  - Recommended looking into options for increasing care at assisted living vs LTC. This was discussed with pt, daughter and  by SW on 11/9.  - RNCC/SW team working on safe discharge plan, greatly appreciate assistance. Looking at community TCU options, has been refused by  TCU multiple times.  - PM&R consulted this admission to assist with evaluation for appropriate dispo and assess rehab potential, greatly appreciate recommendations, agree with PT/OT assessments and also recommend  TCU.   - Last lab check 10/31 and labs in acceptable range, no need for daily labs at this time     Partial Seizure, previously controlled  Worsened chronic aphasia   Difficulty Swallowing  Presented with acute (< 1 day) worsening of aphasia and new facial twitching with abnormal mouth movements concerning for partial seizure.  She underwent stroke eval in the ED which was reassuring against acute CVA,   Given recent recurrence of glioblastoma, there was concern for spread or advance of disease, though both CT and MRI were reassuring to stability of current malignancy from last imaging (9/2023).  No metabolic derangements to explain acute change, and norm WBC with no focal findings was reassuring against acute infection.  Neurology consulted and felt movements were consistent with partial seizure, lacosamide was added, though subsequent EEG showed some degree of persistent seizure activity, and further brief clinical seizures were observed.  Neurologist discussed risk/benefit of a possible third antiepileptic medication (which could carry a risk of heavier sedation), the family elected to remain on the two current medications for quality of life reasons.  - Neurology consulted this admission  - Continue levetiracetam 1250 mg BID   - Lacosamide was started with 200mg IV loading dose (complete) then 100mg IV BID              - Transitioned to PO on 10/16  - Could increase to 150mg BID if increased seizure burden occurs, but has been stable on current dosing   - May reconsider third agent should seizure burden change drastically (ie Grand Mal, etc)   - Lorazepam PRN for any seizure activity > 2 minutes or increased clusters of seizure like activity      H/o L frontoparietal glioblastoma s/p resection, chemotherapy and radiation (completed May 2021), with recurrence of malignancy 2023   Cognitive and functional impairment due to glioblastoma and chemoradiation   Seen by oncology on this visit, repeat imaging stable from  September. Started on trial of steroid, eval symptom improvement. Patient has developed worsening difficulty with swallowing, now eating more. Reduced decadron to 2 mg IV BID from 4 mg BID on 10/17 however patient developed worsening headache on 10/19 so dose increased.  - Oncology consulted, appreciate input - signed off, s/p bevacizumab 10/18, 11/1; due again 11/15  - Rad/Onc consulted, RT started on 10/12 for 10 fractions - finished on 10/25  - Dexamethasone 4mg IV BID  (10/9 - 10/25) - headache resolved and no new neuro symptoms. Start tapering as of 10/26 - 2 mg BID x 2 weeks (10/26-11/9), 2 mg daily x 2 weeks (11/10-11/23), then stop.  - TMP-SMX daily for PCP PPX (pt had hx of PJP)   - PT/OT consulted, appreciate input  Rec TCU vs LTC. Family and patient hopeful for TCU - per , she was functional prior to admission and hopes to return to that level of functionality. Awaiting placement. Will need to be at a facility that will allow her to go to this infusion appointment. SLP recommends upright position and awake/alert when eating.      Likely aspiration pneumonitis: resolved  - brianna albuterol MDI q6H while awake   - DuoNeb QID PRN   - SLP consult completed, appreciate input. Soft diet level 6 with thin liquids.     Depression   - Continue PTA Buspirone and Fluoxetine      H/o DVT   - Continue PTA rivaroxaban      HTN   - Continue PTA Amlodipine      Hemorrhoids   -topical preparation H  -monitor           Diet: Snacks/Supplements Adult: Other; Send [employer] Magic Cup with dinner tray and allow pt/RN to order prn also (Also likes orange if in stock. NO van or ora); With Meals  Regular Diet Adult Thin Liquids (level 0)    DVT Prophylaxis: DOAC  Martino Catheter: Not present  Lines: None     Cardiac Monitoring: None  Code Status: Full Code      Clinically Significant Risk Factors                  # Hypertension: Noted on problem list                   Disposition Plan             Stephanie Calloway,  MD  Hospitalist Service, GOLD TEAM 10  [facility]  Securely message with [employer] (more info)  Text page via [employer] Paging/Directory   See signed in provider for up to date coverage information  ______________________________________________________________________    Interval History   No acute events overnight. Working with therapy this afternoon. Feeling well.    Physical Exam   Vital Signs: Temp: 98.4  F (36.9  C) Temp src: Oral BP: 132/77 Pulse: 71   Resp: 16 SpO2: 96 % O2 Device: None (Room air)    Weight: 153 lbs 7.04 oz    General Appearance: NAD. Lying comfortably in bed.  Respiratory: CTAB. No increased WOB.  Cardiovascular: RRR. No m/r/g  GI: Soft. Non-tender. Non-distended.  Skin: No rash.  Other: AOx4. Moving all extremities. Normal affect.    Medical Decision Making       35 MINUTES SPENT BY ME on the date of service doing chart review, history, exam, documentation & further activities per the note.  MANAGEMENT DISCUSSED with the following over the past 24 hours: SW       Data

## 2023-11-10 NOTE — PROGRESS NOTES
"3202-4676  /81 (BP Location: Right arm)   Pulse 77   Temp 98.3  F (36.8  C) (Oral)   Resp 17   Ht 1.702 m (5' 7\")   Wt 69.6 kg (153 lb 7 oz)   SpO2 94%   BMI 24.03 kg/m      Afebrile, VSS.   No acute event.   Night medications given before 8 pm.   Pt cleaned and ready for bed.       NEURO: Alert and oriented x4.      RESPIRATORY: Room Air, denies dizziness, and SOB. Lungs sound, clear, equal bilateral.     CARDIO: VSS, denies dizziness, and extremity pain.      GI/: Denies N/V, BS active, BM x 1, AUOP via perwick,    SKIN: Intact.      ACTIVITY: Assist x2     PAIN: Denies pain.    DLA: PIV, NS locked.     BG: NO      PLAN:     Awaiting TCU placement.   Transitional Care, Long Term Care, Home Care, Assisted Living   Continue monitoring.     * Italic, from provider's note.    "

## 2023-11-10 NOTE — PLAN OF CARE
3715-1087: VSS. A&O x4. Denies pain & nausea. Turned & repositioned every 2hrs. Purewick in place. No labs this AM. Sleep promotion as able. Continue to follow plan of care.     Problem: Plan of Care - These are the overarching goals to be used throughout the patient stay.    Goal: Plan of Care Review  Description: The Plan of Care Review/Shift note should be completed every shift.  The Outcome Evaluation is a brief statement about your assessment that the patient is improving, declining, or no change.  This information will be displayed automatically on your shift note.  Outcome: Progressing     Problem: Thought Process Alteration  Goal: Optimal Thought Clarity  Outcome: Progressing  Intervention: Minimize Safety Risk and Altered Thought  Recent Flowsheet Documentation  Taken 11/10/2023 0000 by Michelle Jewell, RN  Sensory Stimulation Regulation:   lighting decreased   care clustered   visual stimulation minimized     Problem: Communication Impairment  Goal: Effective Communication Skills  Outcome: Progressing  Intervention: Optimize Communication Skills  Recent Flowsheet Documentation  Taken 11/10/2023 0000 by Michelle Jewell, RN  Communication Enhancement Strategies: call light answered in person   Goal Outcome Evaluation:

## 2023-11-11 PROCEDURE — 250N000013 HC RX MED GY IP 250 OP 250 PS 637: Performed by: STUDENT IN AN ORGANIZED HEALTH CARE EDUCATION/TRAINING PROGRAM

## 2023-11-11 PROCEDURE — 120N000005 HC R&B MS OVERFLOW UMMC

## 2023-11-11 PROCEDURE — 250N000012 HC RX MED GY IP 250 OP 636 PS 637: Performed by: STUDENT IN AN ORGANIZED HEALTH CARE EDUCATION/TRAINING PROGRAM

## 2023-11-11 PROCEDURE — 99232 SBSQ HOSP IP/OBS MODERATE 35: CPT | Performed by: STUDENT IN AN ORGANIZED HEALTH CARE EDUCATION/TRAINING PROGRAM

## 2023-11-11 PROCEDURE — 250N000013 HC RX MED GY IP 250 OP 250 PS 637: Performed by: INTERNAL MEDICINE

## 2023-11-11 RX ADMIN — LACOSAMIDE 100 MG: 50 TABLET, FILM COATED ORAL at 07:39

## 2023-11-11 RX ADMIN — LACOSAMIDE 100 MG: 50 TABLET, FILM COATED ORAL at 19:02

## 2023-11-11 RX ADMIN — ALBUTEROL SULFATE 2 PUFF: 90 AEROSOL, METERED RESPIRATORY (INHALATION) at 13:01

## 2023-11-11 RX ADMIN — LEVETIRACETAM 1250 MG: 750 TABLET, FILM COATED ORAL at 19:02

## 2023-11-11 RX ADMIN — ALBUTEROL SULFATE 2 PUFF: 90 AEROSOL, METERED RESPIRATORY (INHALATION) at 03:15

## 2023-11-11 RX ADMIN — ALBUTEROL SULFATE 2 PUFF: 90 AEROSOL, METERED RESPIRATORY (INHALATION) at 07:39

## 2023-11-11 RX ADMIN — FLUOXETINE 40 MG: 20 CAPSULE ORAL at 07:39

## 2023-11-11 RX ADMIN — RIVAROXABAN 20 MG: 10 TABLET, FILM COATED ORAL at 16:45

## 2023-11-11 RX ADMIN — PANTOPRAZOLE SODIUM 40 MG: 40 TABLET, DELAYED RELEASE ORAL at 07:39

## 2023-11-11 RX ADMIN — DEXAMETHASONE 2 MG: 2 TABLET ORAL at 07:40

## 2023-11-11 RX ADMIN — BUSPIRONE HYDROCHLORIDE 30 MG: 30 TABLET ORAL at 07:39

## 2023-11-11 RX ADMIN — THERA TABS 1 TABLET: TAB at 07:39

## 2023-11-11 RX ADMIN — BUSPIRONE HYDROCHLORIDE 30 MG: 30 TABLET ORAL at 19:03

## 2023-11-11 RX ADMIN — SULFAMETHOXAZOLE AND TRIMETHOPRIM 1 TABLET: 800; 160 TABLET ORAL at 07:40

## 2023-11-11 RX ADMIN — ALBUTEROL SULFATE 2 PUFF: 90 AEROSOL, METERED RESPIRATORY (INHALATION) at 19:09

## 2023-11-11 RX ADMIN — LEVETIRACETAM 1250 MG: 750 TABLET, FILM COATED ORAL at 07:40

## 2023-11-11 RX ADMIN — PANTOPRAZOLE SODIUM 40 MG: 40 TABLET, DELAYED RELEASE ORAL at 16:45

## 2023-11-11 RX ADMIN — AMLODIPINE BESYLATE 5 MG: 5 TABLET ORAL at 07:40

## 2023-11-11 ASSESSMENT — ACTIVITIES OF DAILY LIVING (ADL)
ADLS_ACUITY_SCORE: 63
ADLS_ACUITY_SCORE: 65
ADLS_ACUITY_SCORE: 63
ADLS_ACUITY_SCORE: 63
ADLS_ACUITY_SCORE: 61
ADLS_ACUITY_SCORE: 65
ADLS_ACUITY_SCORE: 61
ADLS_ACUITY_SCORE: 61
ADLS_ACUITY_SCORE: 65

## 2023-11-11 NOTE — PLAN OF CARE
Hours of Care:  1900 - 0700    Temp:  [97  F (36.1  C)-98.4  F (36.9  C)] 97.8  F (36.6  C)  Pulse:  [61-71] 65  Resp:  [16] 16  BP: (126-146)/(74-82) 140/76  SpO2:  [93 %-97 %] 94 %       I: Monitored vitals and assessed pt status.     A: Neuro: A/O x 4.  Able to make needs known.  Respiratory:  On room air.  Lung sounds clear.  Denies shortness of breath at rest.  Cardiac: VSS.    GI: Last BM 11/10.  No report of nausea or vomiting.  : Urinating adequate amounts of clear, yellow urine  Skin:  See PCS for assessment and treatment of wounds and surgical incisions.  LDA:  PIV   Pain: Denies  Isolation:  Standard Precautions  Tests/procedures: None performed overnight.  Diet: Regular Diet   Sleep:  No sleep disturbances noted or reported.   P: Continue to monitor Pt status and report changes to primary team.      Intake/Output Summary (Last 24 hours) at 11/11/2023 0615  Last data filed at 11/10/2023 0643  Gross per 24 hour   Intake --   Output 200 ml   Net -200 ml

## 2023-11-11 NOTE — PROGRESS NOTES
[facility]    Medicine Progress Note - Hospitalist Service, GOLD TEAM 10    Date of Admission:  10/6/2023    Assessment & Plan   Olga Bailey is a 78 year old female admitted on 10/6/2023. She has history of L frontoparietal glioblastoma s/p resection, chemotherapy and radiation with remission in 2021, recently found to have recurrence in September 2023, with baseline cognitive and functional impairment, h/o seizures, h/o DVT on xarelto, HTN, and depression who presented 10/6/23 with acute worsening of baseline aphasia as well as new facial twitching. Admitted to internal medicine for further work up and management. Neurology consulted and patient found to be having partial seizures for which lacosamide was added to keppra with improvement in seizure activity. Continued radiotherapy while on [facility] but has now transferred to [place] for concomitant bevacizumab. Tolerated bevacizumab well however developed worsening headaches and tiredness at reduced dose of steroids so increased back up to 4 mg BID per rad onc. Completed 10 days of radiation on 10/25/23. Medically ready to discharge to hopefully TCU.    Discharge planning  - Medically stable for discharge pending safe discharge plan.  - 11/9 - discussed at weekly SWAT meeting due to difficult discharge.  - Recommended looking into options for increasing care at assisted living vs LTC. This was discussed with pt, daughter and  by SW on 11/9.  - RNCC/SW team working on safe discharge plan, greatly appreciate assistance. Looking at community TCU options, has been refused by  TCU multiple times.  - PM&R consulted this admission to assist with evaluation for appropriate dispo and assess rehab potential, greatly appreciate recommendations, agree with PT/OT assessments and also recommend TCU.   - Last lab check 10/31 and labs in acceptable range, no need for daily labs at this time     Partial Seizure,  previously controlled  Worsened chronic aphasia   Difficulty Swallowing  Presented with acute (< 1 day) worsening of aphasia and new facial twitching with abnormal mouth movements concerning for partial seizure.  She underwent stroke eval in the ED which was reassuring against acute CVA,   Given recent recurrence of glioblastoma, there was concern for spread or advance of disease, though both CT and MRI were reassuring to stability of current malignancy from last imaging (9/2023).  No metabolic derangements to explain acute change, and norm WBC with no focal findings was reassuring against acute infection.  Neurology consulted and felt movements were consistent with partial seizure, lacosamide was added, though subsequent EEG showed some degree of persistent seizure activity, and further brief clinical seizures were observed.  Neurologist discussed risk/benefit of a possible third antiepileptic medication (which could carry a risk of heavier sedation), the family elected to remain on the two current medications for quality of life reasons.  - Neurology consulted this admission  - Continue levetiracetam 1250 mg BID   - Lacosamide was started with 200mg IV loading dose (complete) then 100mg IV BID              - Transitioned to PO on 10/16  - Could increase to 150mg BID if increased seizure burden occurs, but has been stable on current dosing   - May reconsider third agent should seizure burden change drastically (ie Grand Mal, etc)   - Lorazepam PRN for any seizure activity > 2 minutes or increased clusters of seizure like activity      H/o L frontoparietal glioblastoma s/p resection, chemotherapy and radiation (completed May 2021), with recurrence of malignancy 2023   Cognitive and functional impairment due to glioblastoma and chemoradiation   Seen by oncology on this visit, repeat imaging stable from September. Started on trial of steroid, eval symptom improvement. Patient has developed worsening difficulty with  swallowing, now eating more. Reduced decadron to 2 mg IV BID from 4 mg BID on 10/17 however patient developed worsening headache on 10/19 so dose increased.  - Oncology consulted, appreciate input - signed off, s/p bevacizumab 10/18, 11/1; due again 11/15  - Rad/Onc consulted, RT started on 10/12 for 10 fractions - finished on 10/25  - Dexamethasone 4mg IV BID  (10/9 - 10/25) - headache resolved and no new neuro symptoms. Start tapering as of 10/26 - 2 mg BID x 2 weeks (10/26-11/9), 2 mg daily x 2 weeks (11/10-11/23), then stop (taper ordered)  - TMP-SMX daily for PCP PPX (pt had hx of PJP)   - PT/OT consulted, appreciate input  Rec TCU vs LTC. Family and patient hopeful for TCU - per , she was functional prior to admission and hopes to return to that level of functionality. Awaiting placement. Will need to be at a facility that will allow her to go to this infusion appointment. SLP recommends upright position and awake/alert when eating.      Likely aspiration pneumonitis: resolved  - brianna albuterol MDI q6H while awake   - DuoNeb QID PRN   - SLP consult completed, appreciate input. Soft diet level 6 with thin liquids.     Depression   - Continue PTA Buspirone and Fluoxetine      H/o DVT   - Continue PTA rivaroxaban      HTN   - Continue PTA Amlodipine      Hemorrhoids   -topical preparation H  -monitor           Diet: Snacks/Supplements Adult: Other; Send Berry Magic Cup with dinner tray and allow pt/RN to order prn also (Also likes orange if in stock. NO van or ora); With Meals  Regular Diet Adult Thin Liquids (level 0)    DVT Prophylaxis: DOAC  Martino Catheter: Not present  Lines: None     Cardiac Monitoring: None  Code Status: Full Code      Clinically Significant Risk Factors                  # Hypertension: Noted on problem list                   Disposition Plan             Stephanie Calloway MD  Hospitalist Service, GOLD TEAM [address] [place] [address]  Securely  message with [employer] (more info)  Text page via [email or web address] Paging/Directory   See signed in provider for up to date coverage information  ______________________________________________________________________    Interval History   Resting comfortably this morning. Good appetite. Up to chair. No concerns.    Physical Exam   Vital Signs: Temp: 97.8  F (36.6  C) Temp src: Oral BP: (!) 140/76 Pulse: 65   Resp: 16 SpO2: 94 % O2 Device: None (Room air)    Weight: 153 lbs 14.1 oz    General Appearance: NAD. Being transferred from bed to chair in lift.  Respiratory: CTAB. No increased WOB  Cardiovascular: RRR  GI: Non-tender, Nondistended  Skin: No rash  Other: AOx3-4.      Medical Decision Making       35 MINUTES SPENT BY ME on the date of service doing chart review, history, exam, documentation & further activities per the note.      Data

## 2023-11-11 NOTE — PLAN OF CARE
Problem: Plan of Care - These are the overarching goals to be used throughout the patient stay.    Goal: Plan of Care Review  Description: The Plan of Care Review/Shift note should be completed every shift.  The Outcome Evaluation is a brief statement about your assessment that the patient is improving, declining, or no change.  This information will be displayed automatically on your shift note.  Outcome: Not Progressing  Flowsheets (Taken 11/11/2023 4697)  Plan of Care Reviewed With: patient   Goal Outcome Evaluation:      Plan of Care Reviewed With: patient    Appears comfortable, denies pain/nausea. Affect is flat and responds to 50% of questions. (Last weekend was answering all questions) Pt is appropriate and cooperative. Disoriented to time & place. Right sided weakness unchanged,  repositioned q2h. Occ facial twitching and left hand tremors.  Sat in the chair for 4h. Partial bath with hair wash done. Meals are fed to pt with intake 50%. Hyperactive bowel sounds. No stools. Incontinent of urine and stool. Purewick in place and patent. PIV right arm flushed, CDI.  not present this shift. Awaiting placement.

## 2023-11-11 NOTE — PLAN OF CARE
Goal Outcome Evaluation:      Plan of Care Reviewed With: spouse, patient    Overall Patient Progress: no changeOverall Patient Progress: no change    Pt denied nausea and ate well.  Swallows food/pills well.  Denied pain.  Up in chair until 1700.  Left hand tremors present.  Generalized moderate/severe weakness: R>L.  Incontinent of urine/stool.  Has purewick. Able to answer y/n questions well, unless she doesn't know the answer.  Provides little verbalization on own.  Flat affect.  Mepilex on sacral area.   wants us to talk to her and be positive with her as he feels she is depressed (he doesn't want any more medications).

## 2023-11-12 PROCEDURE — 120N000005 HC R&B MS OVERFLOW UMMC

## 2023-11-12 PROCEDURE — 250N000013 HC RX MED GY IP 250 OP 250 PS 637: Performed by: STUDENT IN AN ORGANIZED HEALTH CARE EDUCATION/TRAINING PROGRAM

## 2023-11-12 PROCEDURE — 250N000012 HC RX MED GY IP 250 OP 636 PS 637: Performed by: STUDENT IN AN ORGANIZED HEALTH CARE EDUCATION/TRAINING PROGRAM

## 2023-11-12 PROCEDURE — 250N000013 HC RX MED GY IP 250 OP 250 PS 637: Performed by: INTERNAL MEDICINE

## 2023-11-12 PROCEDURE — 99232 SBSQ HOSP IP/OBS MODERATE 35: CPT | Performed by: STUDENT IN AN ORGANIZED HEALTH CARE EDUCATION/TRAINING PROGRAM

## 2023-11-12 RX ADMIN — PANTOPRAZOLE SODIUM 40 MG: 40 TABLET, DELAYED RELEASE ORAL at 07:41

## 2023-11-12 RX ADMIN — THERA TABS 1 TABLET: TAB at 07:40

## 2023-11-12 RX ADMIN — PANTOPRAZOLE SODIUM 40 MG: 40 TABLET, DELAYED RELEASE ORAL at 16:47

## 2023-11-12 RX ADMIN — BUSPIRONE HYDROCHLORIDE 30 MG: 30 TABLET ORAL at 07:41

## 2023-11-12 RX ADMIN — FLUOXETINE 40 MG: 20 CAPSULE ORAL at 07:41

## 2023-11-12 RX ADMIN — LACOSAMIDE 100 MG: 50 TABLET, FILM COATED ORAL at 18:56

## 2023-11-12 RX ADMIN — ALBUTEROL SULFATE 2 PUFF: 90 AEROSOL, METERED RESPIRATORY (INHALATION) at 07:42

## 2023-11-12 RX ADMIN — BUSPIRONE HYDROCHLORIDE 30 MG: 30 TABLET ORAL at 18:55

## 2023-11-12 RX ADMIN — RIVAROXABAN 20 MG: 10 TABLET, FILM COATED ORAL at 16:47

## 2023-11-12 RX ADMIN — DEXAMETHASONE 2 MG: 2 TABLET ORAL at 07:41

## 2023-11-12 RX ADMIN — LEVETIRACETAM 1250 MG: 750 TABLET, FILM COATED ORAL at 07:40

## 2023-11-12 RX ADMIN — LEVETIRACETAM 1250 MG: 750 TABLET, FILM COATED ORAL at 18:56

## 2023-11-12 RX ADMIN — ALBUTEROL SULFATE 2 PUFF: 90 AEROSOL, METERED RESPIRATORY (INHALATION) at 13:38

## 2023-11-12 RX ADMIN — SULFAMETHOXAZOLE AND TRIMETHOPRIM 1 TABLET: 800; 160 TABLET ORAL at 07:41

## 2023-11-12 RX ADMIN — LACOSAMIDE 100 MG: 50 TABLET, FILM COATED ORAL at 07:41

## 2023-11-12 RX ADMIN — AMLODIPINE BESYLATE 5 MG: 5 TABLET ORAL at 07:41

## 2023-11-12 RX ADMIN — ALBUTEROL SULFATE 2 PUFF: 90 AEROSOL, METERED RESPIRATORY (INHALATION) at 18:56

## 2023-11-12 ASSESSMENT — ACTIVITIES OF DAILY LIVING (ADL)
ADLS_ACUITY_SCORE: 63
ADLS_ACUITY_SCORE: 63
ADLS_ACUITY_SCORE: 59
ADLS_ACUITY_SCORE: 59
ADLS_ACUITY_SCORE: 63
ADLS_ACUITY_SCORE: 63
ADLS_ACUITY_SCORE: 59
ADLS_ACUITY_SCORE: 59
ADLS_ACUITY_SCORE: 63
ADLS_ACUITY_SCORE: 59
ADLS_ACUITY_SCORE: 63
ADLS_ACUITY_SCORE: 59

## 2023-11-12 NOTE — PLAN OF CARE
VSS, Hypertensive SBP 140s. Alert, Oriented to place & self. Answers most questions, flat expression, weak on R side (not new). Adequate urine output via purewick. Turned q2hrs. Awaiting placement.     Continue with plan of care and update team with changes.      Problem: Plan of Care - These are the overarching goals to be used throughout the patient stay.    Goal: Plan of Care Review  Description: The Plan of Care Review/Shift note should be completed every shift.  The Outcome Evaluation is a brief statement about your assessment that the patient is improving, declining, or no change.  This information will be displayed automatically on your shift note.  Outcome: Not Progressing

## 2023-11-12 NOTE — PLAN OF CARE
Goal Outcome Evaluation:      Plan of Care Reviewed With: patient  Problem: Plan of Care - These are the overarching goals to be used throughout the patient stay.    Goal: Plan of Care Review  Description: The Plan of Care Review/Shift note should be completed every shift.  The Outcome Evaluation is a brief statement about your assessment that the patient is improving, declining, or no change.  This information will be displayed automatically on your shift note.  Outcome: Not Progressing  Flowsheets (Taken 11/12/2023 1503)  Plan of Care Reviewed With: patient    Uneventful day with no change in condition. Up in chair for 4h.  at the bedside.

## 2023-11-12 NOTE — PROGRESS NOTES
[facility]    Medicine Progress Note - Hospitalist Service, GOLD TEAM 10    Date of Admission:  10/6/2023    Assessment & Plan   Olga Bailey is a 78 year old female admitted on 10/6/2023. She has history of L frontoparietal glioblastoma s/p resection, chemotherapy and radiation with remission in 2021, recently found to have recurrence in September 2023, with baseline cognitive and functional impairment, h/o seizures, h/o DVT on xarelto, HTN, and depression who presented 10/6/23 with acute worsening of baseline aphasia as well as new facial twitching. Admitted to internal medicine for further work up and management. Neurology consulted and patient found to be having partial seizures for which lacosamide was added to keppra with improvement in seizure activity. Continued radiotherapy while on [facility] but has now transferred to [place] for concomitant bevacizumab. Tolerated bevacizumab well however developed worsening headaches and tiredness at reduced dose of steroids so increased back up to 4 mg BID per rad onc. Completed 10 days of radiation on 10/25/23. Medically ready to discharge to [facility] TCU.    Changes Today:  - PM&R re-consulted per family request with PT switching recommendation to LTC.  - Continue dexamethasone daily.    Discharge planning  - Medically stable for discharge pending safe discharge plan.  - 11/9 - discussed at weekly SWAT meeting due to difficult discharge.  - Recommended looking into options for increasing care at assisted living vs LTC. This was discussed with pt, daughter and  by SW on 11/9.  - RNCC/SW team working on safe discharge plan, greatly appreciate assistance. Looking at community TCU options, has been refused by  TCU multiple times.  - PM&R consulted this admission to assist with evaluation for appropriate dispo and assess rehab potential, greatly appreciate recommendations, agree with PT/OT assessments and also  recommend TCU.   - Last lab check 10/31 and labs in acceptable range, no need for daily labs at this time     Partial Seizure, previously controlled  Worsened chronic aphasia   Difficulty Swallowing  Presented with acute (< 1 day) worsening of aphasia and new facial twitching with abnormal mouth movements concerning for partial seizure.  She underwent stroke eval in the ED which was reassuring against acute CVA,   Given recent recurrence of glioblastoma, there was concern for spread or advance of disease, though both CT and MRI were reassuring to stability of current malignancy from last imaging (9/2023).  No metabolic derangements to explain acute change, and norm WBC with no focal findings was reassuring against acute infection.  Neurology consulted and felt movements were consistent with partial seizure, lacosamide was added, though subsequent EEG showed some degree of persistent seizure activity, and further brief clinical seizures were observed.  Neurologist discussed risk/benefit of a possible third antiepileptic medication (which could carry a risk of heavier sedation), the family elected to remain on the two current medications for quality of life reasons.  - Neurology consulted this admission  - Continue levetiracetam 1250 mg BID   - Lacosamide was started with 200mg IV loading dose (complete) then 100mg IV BID              - Transitioned to PO on 10/16  - Could increase to 150mg BID if increased seizure burden occurs, but has been stable on current dosing   - May reconsider third agent should seizure burden change drastically (ie Grand Mal, etc)   - Lorazepam PRN for any seizure activity > 2 minutes or increased clusters of seizure like activity      H/o L frontoparietal glioblastoma s/p resection, chemotherapy and radiation (completed May 2021), with recurrence of malignancy 2023   Cognitive and functional impairment due to glioblastoma and chemoradiation   Seen by oncology on this visit, repeat imaging  stable from September. Started on trial of steroid, eval symptom improvement. Patient has developed worsening difficulty with swallowing, now eating more. Reduced decadron to 2 mg IV BID from 4 mg BID on 10/17 however patient developed worsening headache on 10/19 so dose increased.  - Oncology consulted, appreciate input - signed off, s/p bevacizumab 10/18, 11/1; due again 11/15  - Rad/Onc consulted, RT started on 10/12 for 10 fractions - finished on 10/25  - Dexamethasone 4mg IV BID  (10/9 - 10/25) - headache resolved and no new neuro symptoms. Start tapering as of 10/26 - 2 mg BID x 2 weeks (10/26-11/9), 2 mg daily x 2 weeks (11/10-11/23), then stop (taper ordered)  - TMP-SMX daily for PCP PPX (pt had hx of PJP)   - PT/OT consulted, appreciate input  Rec TCU vs LTC. Family and patient hopeful for TCU - per , she was functional prior to admission and hopes to return to that level of functionality. Awaiting placement. Will need to be at a facility that will allow her to go to this infusion appointment. SLP recommends upright position and awake/alert when eating.      Likely aspiration pneumonitis: resolved  - brianna albuterol MDI q6H while awake   - DuoNeb QID PRN   - SLP consult completed, appreciate input. Soft diet level 6 with thin liquids.     Depression   - Continue PTA Buspirone and Fluoxetine      H/o DVT   - Continue PTA rivaroxaban      HTN   - Continue PTA Amlodipine      Hemorrhoids   -topical preparation H  -monitor           Diet: Snacks/Supplements Adult: Other; Send Berry Magic Cup with dinner tray and allow pt/RN to order prn also (Also likes orange if in stock. NO van or ora); With Meals  Regular Diet Adult Thin Liquids (level 0)    DVT Prophylaxis: DOAC  Martino Catheter: Not present  Lines: None     Cardiac Monitoring: None  Code Status: Full Code      Clinically Significant Risk Factors                  # Hypertension: Noted on problem list                   Disposition Plan              Stephanie Calloway MD  Hospitalist Service, GOLD TEAM 10  [facility]  Securely message with [employer] (more info)  Text page via [employer] Paging/Directory   See signed in provider for up to date coverage information  ______________________________________________________________________    Interval History   No acute events overnight. Resting comfortably in chair this afternoon. Ate all of her lunch. No acute concerns.    Physical Exam   Vital Signs: Temp: 97.3  F (36.3  C) Temp src: Oral BP: (!) 145/79 Pulse: 61   Resp: 16 SpO2: 96 % O2 Device: None (Room air)    Weight: 153 lbs 14.1 oz    General Appearance: NAD. Lying comfortably in bed.  Respiratory: CTAB. No increased WOB.  Cardiovascular: RRR. No m/r/g  GI: Soft. Non-tender. Non-distended.  Skin: No rash.  Other: AOx4. Flat affect.    Medical Decision Making       35 MINUTES SPENT BY ME on the date of service doing chart review, history, exam, documentation & further activities per the note.      Data

## 2023-11-12 NOTE — PLAN OF CARE
"Goal Outcome Evaluation:    /86 (BP Location: Right arm)   Pulse 62   Temp 98.4  F (36.9  C) (Axillary)   Resp 14   Ht 1.702 m (5' 7\")   Wt 69.8 kg (153 lb 14.1 oz)   SpO2 96%   BMI 24.10 kg/m    [id number]    Neuro: A&Ox4.   Cardiac: SR. VSS.   Respiratory: Sating 96% on RA.  GI/: No bm. Adequate urine output.   Diet/appetite: Tolerating regular diet. Eating well.  Activity: Total lift up to chair. Turned and reposition in bed every two hours.  Pain: At acceptable level on current regimen.   Skin: No new deficits noted.  LDA's:PIV saline locked.  Plan: Continue with POC. Notify primary team with changes.  "

## 2023-11-12 NOTE — PLAN OF CARE
Problem: Plan of Care - These are the overarching goals to be used throughout the patient stay.    Goal: Plan of Care Review  Description: The Plan of Care Review/Shift note should be completed every shift.  The Outcome Evaluation is a brief statement about your assessment that the patient is improving, declining, or no change.  This information will be displayed automatically on your shift note.  Outcome: Not Progressing     ([id number])    Alert and oriented x4. Patient repositioned from chair to bed. Feed dinner by . Denies pain and nausea. Medications scheduled for 2000 given early per patient request.

## 2023-11-13 ENCOUNTER — APPOINTMENT (OUTPATIENT)
Dept: PHYSICAL THERAPY | Facility: CLINIC | Age: 78
DRG: 054 | End: 2023-11-13
Attending: INTERNAL MEDICINE
Payer: MEDICARE

## 2023-11-13 PROCEDURE — 250N000013 HC RX MED GY IP 250 OP 250 PS 637: Performed by: INTERNAL MEDICINE

## 2023-11-13 PROCEDURE — 99232 SBSQ HOSP IP/OBS MODERATE 35: CPT | Mod: GC | Performed by: STUDENT IN AN ORGANIZED HEALTH CARE EDUCATION/TRAINING PROGRAM

## 2023-11-13 PROCEDURE — 97110 THERAPEUTIC EXERCISES: CPT | Mod: GP | Performed by: PHYSICAL THERAPIST

## 2023-11-13 PROCEDURE — 250N000013 HC RX MED GY IP 250 OP 250 PS 637: Performed by: STUDENT IN AN ORGANIZED HEALTH CARE EDUCATION/TRAINING PROGRAM

## 2023-11-13 PROCEDURE — 120N000005 HC R&B MS OVERFLOW UMMC

## 2023-11-13 PROCEDURE — 97530 THERAPEUTIC ACTIVITIES: CPT | Mod: GP | Performed by: PHYSICAL THERAPIST

## 2023-11-13 PROCEDURE — 250N000012 HC RX MED GY IP 250 OP 636 PS 637: Performed by: STUDENT IN AN ORGANIZED HEALTH CARE EDUCATION/TRAINING PROGRAM

## 2023-11-13 PROCEDURE — 99233 SBSQ HOSP IP/OBS HIGH 50: CPT | Performed by: STUDENT IN AN ORGANIZED HEALTH CARE EDUCATION/TRAINING PROGRAM

## 2023-11-13 RX ADMIN — LACOSAMIDE 100 MG: 50 TABLET, FILM COATED ORAL at 19:06

## 2023-11-13 RX ADMIN — LACOSAMIDE 100 MG: 50 TABLET, FILM COATED ORAL at 08:32

## 2023-11-13 RX ADMIN — BUSPIRONE HYDROCHLORIDE 30 MG: 30 TABLET ORAL at 08:31

## 2023-11-13 RX ADMIN — ALBUTEROL SULFATE 2 PUFF: 90 AEROSOL, METERED RESPIRATORY (INHALATION) at 19:07

## 2023-11-13 RX ADMIN — BUSPIRONE HYDROCHLORIDE 30 MG: 30 TABLET ORAL at 19:06

## 2023-11-13 RX ADMIN — ALBUTEROL SULFATE 2 PUFF: 90 AEROSOL, METERED RESPIRATORY (INHALATION) at 08:31

## 2023-11-13 RX ADMIN — LEVETIRACETAM 1250 MG: 750 TABLET, FILM COATED ORAL at 08:31

## 2023-11-13 RX ADMIN — AMLODIPINE BESYLATE 5 MG: 5 TABLET ORAL at 08:32

## 2023-11-13 RX ADMIN — SULFAMETHOXAZOLE AND TRIMETHOPRIM 1 TABLET: 800; 160 TABLET ORAL at 08:32

## 2023-11-13 RX ADMIN — LEVETIRACETAM 1250 MG: 750 TABLET, FILM COATED ORAL at 19:06

## 2023-11-13 RX ADMIN — THERA TABS 1 TABLET: TAB at 08:32

## 2023-11-13 RX ADMIN — POLYETHYLENE GLYCOL 3350 17 G: 17 POWDER, FOR SOLUTION ORAL at 19:05

## 2023-11-13 RX ADMIN — FLUOXETINE 40 MG: 20 CAPSULE ORAL at 08:32

## 2023-11-13 RX ADMIN — ALBUTEROL SULFATE 2 PUFF: 90 AEROSOL, METERED RESPIRATORY (INHALATION) at 15:14

## 2023-11-13 RX ADMIN — PANTOPRAZOLE SODIUM 40 MG: 40 TABLET, DELAYED RELEASE ORAL at 16:55

## 2023-11-13 RX ADMIN — PANTOPRAZOLE SODIUM 40 MG: 40 TABLET, DELAYED RELEASE ORAL at 08:32

## 2023-11-13 RX ADMIN — RIVAROXABAN 20 MG: 10 TABLET, FILM COATED ORAL at 16:54

## 2023-11-13 RX ADMIN — DEXAMETHASONE 2 MG: 2 TABLET ORAL at 08:32

## 2023-11-13 ASSESSMENT — ACTIVITIES OF DAILY LIVING (ADL)
ADLS_ACUITY_SCORE: 61
ADLS_ACUITY_SCORE: 65
ADLS_ACUITY_SCORE: 63
ADLS_ACUITY_SCORE: 63
ADLS_ACUITY_SCORE: 61
ADLS_ACUITY_SCORE: 63
ADLS_ACUITY_SCORE: 57
ADLS_ACUITY_SCORE: 63
ADLS_ACUITY_SCORE: 57
ADLS_ACUITY_SCORE: 63
ADLS_ACUITY_SCORE: 65
ADLS_ACUITY_SCORE: 65

## 2023-11-13 NOTE — PROGRESS NOTES
This RN spoke with , who is concerned that pt is being assessed at times when medications are causing her to be fatigued.  He feels that her functional ability is being misunderstood.  He is frustrated that the PMR consult today was done after morning medications were given and states the the medications make patient lethargic.

## 2023-11-13 NOTE — CONSULTS
[facility]     PM&R PROGRESS NOTE      ASSESSMENT/PLAN:    Ms. Olga Bailey is a right handed 78 year old who presents with with decreased tolerance to activity and functional impairments in mobility, gait, language, and cognition secondary to a prolonged hospitalization where she was found to be having partial seizures in the context of known recurrence of a left frontoparietal glioblastoma undergoing radiation and chemotherapy. Pertinent past medical history includes seizures, DVT on Xarelto, HTN, depression and a left frontoparietal glioblastoma status post resection, chemotherapy and radiation with remission in 2021 with subsequent cognitive and functional impairments and recurrence in September 2023.      Physical Medicine and Rehabilitation have been consulted to reevaluate patient for rehabilitation needs/discharge planning. Last seen on 11/01 and at that time recommendation was for TCU. In review of chart and therapy notes, patient with limited progress during therapy sessions. Recommendation from therapies have now transitioned to long term care facility.     With current acuity of care and subsequent denial of multiple TCUs, it remains unlikely she receive TCU level of care following this admission. During long term care, we strongly recommend close outpatient PM&R follow up with initiation of intensive outpatient therapies were her cognitive impairments to improve. She would additionally benefit from outpatient psychological support given functional decline and her medical diagnoses.     Please see addendum for details of Dr. Macario's discussion with patient's .      Thank you for consulting the PM&R Department.     Patient was discussed with attending physician, Dr. Macario.     Germán Restrepo DO  PM&R Resident  [facility]      INTERVAL HISTORY:  Olga Bailey was seen and examined at bedside. PM&R requested to reevaluate patient per  "family request as PT has updated recommendation to LTC. Patient was last seen by our service on 11/01/23. Per chart, patient is being discussed in weekly SWAT meetings due to difficult discharge.     Patient seen at bedside.  was not present at that time. Limited interaction this morning as patient would answer approximately 25% of questions. She reported  had been there earlier this morning. She stated when asked about therapies \"I can't do anything\" but when asked to specify she was unable to answer.      was called following visit. Please see Dr. Macario's addendum for details of this conversation.     Functionally:   Co treats with PT/OT: Mod to Max Ax2 for bed mobility with extensive verbal cues. Total A LB dressing. Max A UB dressing. Self care extended due to extensive verbal cues but patient unable to assist beyond bridging in bed to complete ADL tasks. Noted by therapies to have hit plateau with generalized weakness, cognition and right sided weakness as primary barriers. LTC recommended with continued PT/OT.    Medically:   - Remains medically stable and ready for discharge.  - Continues dexamethasone taper.     MEDICATIONS:  No current outpatient medications on file.       EXAMINATION:  BP (!) 140/97   Pulse 68   Temp 98.2  F (36.8  C) (Axillary)   Resp 16   Ht 1.702 m (5' 7\")   Wt 69.8 kg (153 lb 14.1 oz)   SpO2 95%   BMI 24.10 kg/m      General: NAD. Lying comfortably in bed   HEENT: ATNC, EOMI, PERRL, no discharge from nares or ears    Pulmonary: normal work of breathing on room air   Extremities: Warm and well perfused in bilateral upper and lower extremities.   MSK/neuro: Abbreviated due to participation. Awake. Unable to assess orientation, would only answer approximately 25% of questions. Tremor LUE.   Skin: normal skin color, texture, turgor      LABS AND IMAGING:  No results found. However, due to the size of the patient record, not all encounters were searched. Please " check Results Review for a complete set of results.

## 2023-11-13 NOTE — PROGRESS NOTES
Clinical Nutrition Services- Brief Note    Pt is tolerating a Regular diet, eating well per nursing documentation and [employer] review. Weight history shows no significant weight loss. No nutrition issues identified at this time.   RD to sign off for now. If nutrition concerns arise, please place nutrition consult.   Jocelyn Alejandra RDN, LD    5C (BMT) RD pager: [phone]  Weekend/Holiday RD pager: [phone]

## 2023-11-13 NOTE — PLAN OF CARE
AVSS. Disoriented to time and situation. Turned q2hr. No complaints of pain or nausea. Neuros unchanged. Awaiting placement.     Continue with plan of care and update team with changes.     Problem: Plan of Care - These are the overarching goals to be used throughout the patient stay.    Goal: Plan of Care Review  Description: The Plan of Care Review/Shift note should be completed every shift.  The Outcome Evaluation is a brief statement about your assessment that the patient is improving, declining, or no change.  This information will be displayed automatically on your shift note.  Outcome: Not Progressing

## 2023-11-13 NOTE — PROGRESS NOTES
[facility]    Medicine Progress Note - Hospitalist Service, GOLD TEAM 10    Date of Admission:  10/6/2023    Assessment & Plan   Olga Bailey is a 78 year old female admitted on 10/6/2023. She has history of L frontoparietal glioblastoma s/p resection, chemotherapy and radiation with remission in 2021, recently found to have recurrence in September 2023, with baseline cognitive and functional impairment, h/o seizures, h/o DVT on xarelto, HTN, and depression who presented 10/6/23 with acute worsening of baseline aphasia as well as new facial twitching. Admitted to internal medicine for further work up and management. Neurology consulted and patient found to be having partial seizures for which lacosamide was added to keppra with improvement in seizure activity. Continued radiotherapy while on [facility] but has now transferred to [place] for concomitant bevacizumab. Tolerated bevacizumab well however developed worsening headaches and tiredness at reduced dose of steroids so increased back up to 4 mg BID per rad onc. Completed 10 days of radiation on 10/25/23. Medically ready to discharge to hopefully TCU.    Discharge planning  - Medically stable for discharge pending safe discharge plan  - 11/13 - had long discussion with pts  and CAMRON Miranda about discharge planning. We discussed that unfortunately TCU is not an option at this point and we need to move forward with LTC.   - 11/9 - discussed at weekly SWAT meeting due to difficult discharge.  - Recommended looking into options for increasing care at assisted living vs LTC. This was discussed with pt, daughter and  by SW on 11/9.  - RNCC/SW team working on safe discharge plan, greatly appreciate assistance. Looking at community TCU options, has been refused by  TCU multiple times.  - PM&R consulted this admission to assist with evaluation for appropriate dispo and assess rehab potential, greatly  appreciate recommendations, agree with PT/OT assessments and also recommend TCU.   - Last lab check 10/31 and labs in acceptable range, no need for daily labs at this time     Partial Seizure, previously controlled  Worsened chronic aphasia   Difficulty Swallowing  Presented with acute (< 1 day) worsening of aphasia and new facial twitching with abnormal mouth movements concerning for partial seizure.  She underwent stroke eval in the ED which was reassuring against acute CVA,   Given recent recurrence of glioblastoma, there was concern for spread or advance of disease, though both CT and MRI were reassuring to stability of current malignancy from last imaging (9/2023).  No metabolic derangements to explain acute change, and norm WBC with no focal findings was reassuring against acute infection.  Neurology consulted and felt movements were consistent with partial seizure, lacosamide was added, though subsequent EEG showed some degree of persistent seizure activity, and further brief clinical seizures were observed.  Neurologist discussed risk/benefit of a possible third antiepileptic medication (which could carry a risk of heavier sedation), the family elected to remain on the two current medications for quality of life reasons.  - Neurology consulted this admission  - Continue levetiracetam 1250 mg BID   - Lacosamide was started with 200mg IV loading dose (complete) then 100mg IV BID              - Transitioned to PO on 10/16  - Could increase to 150mg BID if increased seizure burden occurs, but has been stable on current dosing   - May reconsider third agent should seizure burden change drastically (ie Grand Mal, etc)   - Lorazepam PRN for any seizure activity > 2 minutes or increased clusters of seizure like activity      H/o L frontoparietal glioblastoma s/p resection, chemotherapy and radiation (completed May 2021), with recurrence of malignancy 2023   Cognitive and functional impairment due to glioblastoma and  chemoradiation   Seen by oncology on this visit, repeat imaging stable from September. Started on trial of steroid, eval symptom improvement. Patient has developed worsening difficulty with swallowing, now eating more. Reduced decadron to 2 mg IV BID from 4 mg BID on 10/17 however patient developed worsening headache on 10/19 so dose increased.  - Oncology consulted, appreciate input - signed off, s/p bevacizumab 10/18, 11/1; due again 11/15  - Rad/Onc consulted, RT started on 10/12 for 10 fractions - finished on 10/25  - Dexamethasone 4mg IV BID  (10/9 - 10/25) - headache resolved and no new neuro symptoms. Start tapering as of 10/26 - 2 mg BID x 2 weeks (10/26-11/9), 2 mg daily x 2 weeks (11/10-11/23), then stop (taper ordered)  - TMP-SMX daily for PCP PPX (pt had hx of PJP)   - PT/OT consulted, appreciate input  Rec TCU vs LTC. Family and patient hopeful for TCU - per , she was functional prior to admission and hopes to return to that level of functionality. Awaiting placement. Will need to be at a facility that will allow her to go to this infusion appointment. SLP recommends upright position and awake/alert when eating.      Likely aspiration pneumonitis: resolved  - brianna albuterol MDI q6H while awake   - DuoNeb QID PRN   - SLP consult completed, appreciate input. Soft diet level 6 with thin liquids.     Depression   - Continue PTA Buspirone and Fluoxetine      H/o DVT   - Continue PTA rivaroxaban      HTN   - Continue PTA Amlodipine      Hemorrhoids   -topical preparation H  -monitor           Diet: Snacks/Supplements Adult: Other; Send Berry Magic Cup with dinner tray and allow pt/RN to order prn also (Also likes orange if in stock. NO van or ora); With Meals  Regular Diet Adult Thin Liquids (level 0)    DVT Prophylaxis: DOAC  Martino Catheter: Not present  Lines: None     Cardiac Monitoring: None  Code Status: Full Code      Clinically Significant Risk Factors                  # Hypertension: Noted on  problem list                   Disposition Plan             Stephanie Calloway MD  Hospitalist Service, GOLD TEAM 10  [facility]  Securely message with [employer] (more info)  Text page via [employer] Paging/Directory   See signed in provider for up to date coverage information  ______________________________________________________________________    Interval History   No acute events overnight. Noted to be more lethargic after meds this AM, improved with time.    Physical Exam   Vital Signs: Temp: 97.3  F (36.3  C) Temp src: Oral BP: 132/75 Pulse: 74   Resp: 16 SpO2: 94 % O2 Device: None (Room air)    Weight: 153 lbs 14.1 oz    General Appearance: NAD. Lying comfortably in bed.  Respiratory: CTAB. No increased WOB.  Cardiovascular: RRR. No m/r/g  GI: Soft. Non-tender. Non-distended.  Skin: No rash.  Other: AOx4. Flat affect.    Medical Decision Making       55 MINUTES SPENT BY ME on the date of service doing chart review, history, exam, documentation & further activities per the note.      Data         Imaging results reviewed over the past 24 hrs:   No results found for this or any previous visit (from the past 24 hour(s)).

## 2023-11-13 NOTE — PROGRESS NOTES
Care Management Follow Up    Length of Stay (days): 37    Expected Discharge Date:       Concerns to be Addressed: discharge planning     Patient plan of care discussed at interdisciplinary rounds: No    Anticipated Discharge Disposition: Long Term Care  Disposition Comments: n/a  Anticipated Discharge Services:  (TBD)  Anticipated Discharge DME:  (TBD)    Patient/family educated on Medicare website which has current facility and service quality ratings: yes  Education Provided on the Discharge Plan: Yes  Patient/Family in Agreement with the Plan: yes    Referrals Placed by CM/SW:  (LongTerm Care)  Private pay costs discussed: private room/amenity fees    Additional Information:  Resent the referral to [phone]  They will only consider pt for LTC due being a 2 person assist and higher needs  [facility] a LTC  room available. They are working on cleaning out the room. Pt hasn't been reviewed yet.  1:30 PM: Patient's MD will let SW know that patient  is now requesting requesting an ethics meeting.  MD and SW agreed to meet with patient and her  to talk about patient's needs and 's concerns.  MD and  met with patient and  at chair side.  MD addressed patient's 's request for ethics.  MD asked what patient's  and request for ethics was about. Patient's  feels that [facility] has abandon Olga when she should be able to go to their TCU.  MD talked with patient's  about patient's needs being escalated to 3 different leadership and [facility] still refusing to take patient.  Unfortunately the hospital can't make [facility] take patient.She didn't know how ethics could assist.  Pt's  is no longer wanting to speak with ethics.   Pt's  is concerned that pt will not receive the services she needs at an LTC. CAMRON talked about [facility] and that is is a highly rated facility. Sw explained that he can ask any questions and  tour the facility before accepting the facility.         SW to follow and assist with any other discharge needs that may arise.  DAYNA Peres   5A beds:5220-40  5C beds 5417-32 (no BMT pt's)     Phone: [phone]  Pager: [phone]

## 2023-11-14 ENCOUNTER — APPOINTMENT (OUTPATIENT)
Dept: PHYSICAL THERAPY | Facility: CLINIC | Age: 78
DRG: 054 | End: 2023-11-14
Attending: INTERNAL MEDICINE
Payer: MEDICARE

## 2023-11-14 PROCEDURE — 99232 SBSQ HOSP IP/OBS MODERATE 35: CPT | Performed by: STUDENT IN AN ORGANIZED HEALTH CARE EDUCATION/TRAINING PROGRAM

## 2023-11-14 PROCEDURE — 250N000013 HC RX MED GY IP 250 OP 250 PS 637: Performed by: STUDENT IN AN ORGANIZED HEALTH CARE EDUCATION/TRAINING PROGRAM

## 2023-11-14 PROCEDURE — 97110 THERAPEUTIC EXERCISES: CPT | Mod: GP | Performed by: PHYSICAL THERAPIST

## 2023-11-14 PROCEDURE — 120N000005 HC R&B MS OVERFLOW UMMC

## 2023-11-14 PROCEDURE — 97530 THERAPEUTIC ACTIVITIES: CPT | Mod: GP | Performed by: PHYSICAL THERAPIST

## 2023-11-14 PROCEDURE — 250N000013 HC RX MED GY IP 250 OP 250 PS 637: Performed by: INTERNAL MEDICINE

## 2023-11-14 PROCEDURE — 250N000012 HC RX MED GY IP 250 OP 636 PS 637: Performed by: STUDENT IN AN ORGANIZED HEALTH CARE EDUCATION/TRAINING PROGRAM

## 2023-11-14 RX ADMIN — ALBUTEROL SULFATE 2 PUFF: 90 AEROSOL, METERED RESPIRATORY (INHALATION) at 13:09

## 2023-11-14 RX ADMIN — PANTOPRAZOLE SODIUM 40 MG: 40 TABLET, DELAYED RELEASE ORAL at 08:22

## 2023-11-14 RX ADMIN — AMLODIPINE BESYLATE 5 MG: 5 TABLET ORAL at 08:22

## 2023-11-14 RX ADMIN — ALBUTEROL SULFATE 2 PUFF: 90 AEROSOL, METERED RESPIRATORY (INHALATION) at 08:22

## 2023-11-14 RX ADMIN — RIVAROXABAN 20 MG: 10 TABLET, FILM COATED ORAL at 17:29

## 2023-11-14 RX ADMIN — DEXAMETHASONE 2 MG: 2 TABLET ORAL at 08:22

## 2023-11-14 RX ADMIN — LACOSAMIDE 100 MG: 50 TABLET, FILM COATED ORAL at 18:57

## 2023-11-14 RX ADMIN — LACOSAMIDE 100 MG: 50 TABLET, FILM COATED ORAL at 08:22

## 2023-11-14 RX ADMIN — ALBUTEROL SULFATE 2 PUFF: 90 AEROSOL, METERED RESPIRATORY (INHALATION) at 19:01

## 2023-11-14 RX ADMIN — PANTOPRAZOLE SODIUM 40 MG: 40 TABLET, DELAYED RELEASE ORAL at 17:29

## 2023-11-14 RX ADMIN — FLUOXETINE 40 MG: 20 CAPSULE ORAL at 08:22

## 2023-11-14 RX ADMIN — BUSPIRONE HYDROCHLORIDE 30 MG: 30 TABLET ORAL at 18:57

## 2023-11-14 RX ADMIN — THERA TABS 1 TABLET: TAB at 08:22

## 2023-11-14 RX ADMIN — BUSPIRONE HYDROCHLORIDE 30 MG: 30 TABLET ORAL at 08:22

## 2023-11-14 RX ADMIN — SULFAMETHOXAZOLE AND TRIMETHOPRIM 1 TABLET: 800; 160 TABLET ORAL at 08:22

## 2023-11-14 RX ADMIN — LEVETIRACETAM 1250 MG: 750 TABLET, FILM COATED ORAL at 08:22

## 2023-11-14 RX ADMIN — LEVETIRACETAM 1250 MG: 750 TABLET, FILM COATED ORAL at 18:57

## 2023-11-14 ASSESSMENT — ACTIVITIES OF DAILY LIVING (ADL)
ADLS_ACUITY_SCORE: 57

## 2023-11-14 NOTE — PROGRESS NOTES
[facility]    Medicine Progress Note - Hospitalist Service, GOLD TEAM 10    Date of Admission:  10/6/2023    Assessment & Plan   Olga Bailey is a 78 year old female admitted on 10/6/2023. She has history of L frontoparietal glioblastoma s/p resection, chemotherapy and radiation with remission in 2021, recently found to have recurrence in September 2023, with baseline cognitive and functional impairment, h/o seizures, h/o DVT on xarelto, HTN, and depression who presented 10/6/23 with acute worsening of baseline aphasia as well as new facial twitching. Admitted to internal medicine for further work up and management. Neurology consulted and patient found to be having partial seizures for which lacosamide was added to keppra with improvement in seizure activity. Continued radiotherapy while on [facility] but has now transferred to [place] for concomitant bevacizumab. Tolerated bevacizumab well however developed worsening headaches and tiredness at reduced dose of steroids so increased back up to 4 mg BID per rad onc. Completed 10 days of radiation on 10/25/23. Medically ready to discharge to hopefully TCU.    Interval Changes:  -Med ready for discharge  -Will contact oncology in AM to release infusion for bevacizumab which is due on 11/15  -Prosthetic consult for brace for drop foot    Discharge planning  - Medically stable for discharge pending safe discharge plan  - 11/13 - primary MD had long discussion with pts  and CAMRON Miranda about discharge planning. Discussed that unfortunately TCU is not an option at this point and we need to move forward with LTC.   - 11/9 - discussed at weekly SWAT meeting due to difficult discharge.  - Recommended looking into options for increasing care at assisted living vs LTC. This was discussed with pt, daughter and  by SW on 11/9.  - RNCC/SW team working on safe discharge plan, greatly appreciate assistance. Looking at  [facility] TCU options, has been refused by  TCU multiple times.  - PM&R consulted this admission to assist with evaluation for appropriate dispo and assess rehab potential, last evaluated on 11/3  - Last lab check 10/31 and labs in acceptable range, no need for daily labs at this time     Partial Seizure, previously controlled  Worsened chronic aphasia   Difficulty Swallowing  Presented with acute (< 1 day) worsening of aphasia and new facial twitching with abnormal mouth movements concerning for partial seizure.  She underwent stroke eval in the ED which was reassuring against acute CVA,   Given recent recurrence of glioblastoma, there was concern for spread or advance of disease, though both CT and MRI were reassuring to stability of current malignancy from last imaging (9/2023).  No metabolic derangements to explain acute change, and norm WBC with no focal findings was reassuring against acute infection.  Neurology consulted and felt movements were consistent with partial seizure, lacosamide was added, though subsequent EEG showed some degree of persistent seizure activity, and further brief clinical seizures were observed.  Neurologist discussed risk/benefit of a possible third antiepileptic medication (which could carry a risk of heavier sedation), the family elected to remain on the two current medications for quality of life reasons.  - Neurology consulted this admission  - Continue levetiracetam 1250 mg BID   - Lacosamide was started with 200mg IV loading dose (complete) then 100mg IV BID              - Transitioned to PO on 10/16  - Could increase to 150mg BID if increased seizure burden occurs, but has been stable on current dosing   - May reconsider third agent should seizure burden change drastically (ie Grand Mal, etc)   - Lorazepam PRN for any seizure activity > 2 minutes or increased clusters of seizure like activity      H/o L frontoparietal glioblastoma s/p resection, chemotherapy and radiation  (completed May 2021), with recurrence of malignancy 2023   Cognitive and functional impairment due to glioblastoma and chemoradiation   Seen by oncology on this visit, repeat imaging stable from September. Started on trial of steroid, eval symptom improvement. Patient has developed worsening difficulty with swallowing, now eating more. Reduced decadron to 2 mg IV BID from 4 mg BID on 10/17 however patient developed worsening headache on 10/19 so dose increased.  - Oncology consulted, appreciate input - signed off, s/p bevacizumab 10/18, 11/1; due again 11/15  - Rad/Onc consulted, RT started on 10/12 for 10 fractions - finished on 10/25  - Dexamethasone 4mg IV BID  (10/9 - 10/25) - headache resolved and no new neuro symptoms. Start tapering as of 10/26 - 2 mg BID x 2 weeks (10/26-11/9), 2 mg daily x 2 weeks (11/10-11/23), then stop (taper ordered)  - TMP-SMX daily for PCP PPX (pt had hx of PJP)   - PT/OT consulted, appreciate input  -Likely LTC on discharge, significant discussion with primary MD, PMR and PT/OT, Case management on 11/13. [facility] unable to accept patient and recommendation per specialties for LTC placement on discharge     Likely aspiration pneumonitis: resolved  - brianna albuterol MDI q6H while awake   - DuoNeb QID PRN   - SLP consult completed, appreciate input. Soft diet level 6 with thin liquids.     Depression   - Continue PTA Buspirone and Fluoxetine      H/o DVT   - Continue PTA rivaroxaban      HTN   - Continue PTA Amlodipine      Hemorrhoids   -topical preparation H  -monitor           Diet: Snacks/Supplements Adult: Other; Send Berry Magic Cup with dinner tray and allow pt/RN to order prn also (Also likes orange if in stock. NO van or ora); With Meals  Regular Diet Adult Thin Liquids (level 0)    DVT Prophylaxis: DOAC  Martino Catheter: Not present  Lines: None     Cardiac Monitoring: None  Code Status: Full Code      Clinically Significant Risk Factors                  # Hypertension: Noted on  problem list                   Disposition Plan             João Kent MD  Hospitalist Service, GOLD TEAM 10  [facility]  Securely message with [employer] (more info)  Text page via [employer] Paging/Directory   See signed in provider for up to date coverage information  ______________________________________________________________________    Interval History   No acute events overnight. She denies any new symptoms. Per  at bedside he notes she is more somnolent around medication administration times. He is also hoping for her to get a brace for her drop foot which has not come thus far. They have not met with prosthetics or anyone at this time.     Physical Exam   Vital Signs: Temp: 98.4  F (36.9  C) Temp src: Axillary BP: 138/78 Pulse: 69   Resp: 16 SpO2: 95 % O2 Device: None (Room air)    Weight: 154 lbs 5.15 oz    General Appearance: No acute distress, lying in bed  Respiratory: CTAB. No increased WOB.  Cardiovascular: RRR. No m/  GI: Soft. Non-tender. Non-distended.  Skin: No rash.  Neuro: right upper extremity weakness 2/5 strength in upper and lower right extremities .  strength present in left hand and able to plantar and dorsiflex left foot  Other: AOx4. Flat affect.    Medical Decision Making       55 MINUTES SPENT BY ME on the date of service doing chart review, history, exam, documentation & further activities per the note.      Data         Imaging results reviewed over the past 24 hrs:   No results found for this or any previous visit (from the past 24 hour(s)).

## 2023-11-14 NOTE — PLAN OF CARE
Hours of care 2300 - 0730    VSS. Afebrile. RA. Minimizing disturbances overnight. Appears to be sleeping comfortably. Denies pain. Q2H turns.

## 2023-11-14 NOTE — PROGRESS NOTES
Care Management Follow Up    Length of Stay (days): 38    Expected Discharge Date: 11/16/2023     Concerns to be Addressed: discharge planning     Patient plan of care discussed at interdisciplinary rounds: yes    Anticipated Discharge Disposition: Long Term Care  [facility]  [address]  [place], [place] [address] [phone] (Alana RN)     Anticipated Discharge Services:        [facility]  [address]  [place], [place] [address] [phone]    Anticipated Discharge DME:  (TBD)    Patient/family educated on Medicare website which has current facility and service quality ratings: yes  Education Provided on the Discharge Plan: Yes  Patient/Family in Agreement with the Plan: yes    Referrals Placed by CM/SW:  (LongTerm Care)  Private pay costs discussed: private room/amenity fees  LTC referral:      [facility]: [phone]  Fax: [phone]  10/11: CAMRON sent referral via communication in Epic to [phone]  11/13: resent referral  11/14: L/m on [facility]'s  asking for a call back      Additional Information:  CAMRON spoke with Edwina from [facility].  Edwina let CAMRON know that when she spoke with patient's , Paul, yesterday he was adamant that Olga, patient, we did not go to an LTC.  CAMRON explained that the MD and SW met with Paul and Olga yesterday and we talked about going to an LTC and that Paul did concede to sending referrals to LTC.    Edwina is going to asked to put Olga's name on the [facility] LTC list.  CAMRON and Edwina also spoke about patient's returning to assisted living facility.  Edwina is going to have the assisted living RN, Alana, come out to the hospital to do an assessment.  Edwina will ask Alana tomorrow.  CAMRON will follow up with Alana tomorrow to find out the time she will be able to come.    Ruth, physical therapist, called CAMRON to verify patient's discharge plan because patient's  stated yesterday  that he does not want Olga to go to a long-term care facility.  CAMRON explained to PT about the conversation that the MD SORTO had with patient yesterday.  Ruth let CAMRON know that patient has made little progress in the last month.  Ruth would like to be here when Alana, the RN from [facility], is here to assess patient.  She would like to ask her questions about what Olga needs in order to return to the assisted living facility    SW to follow and assist with any other discharge needs that may arise.  DAYNA Peres   5A beds:5220-40  5C beds 5417-32 (no BMT pt's)     Phone: [phone]  Pager: [phone]

## 2023-11-14 NOTE — PLAN OF CARE
"[id number]: VSS.  AF.  Denies pain. Awaiting LTC bed.  Worked with PT today.  Missed opportunity with OT due to PA visit.   frustrated with course of events and that pt is not accepted at TCU.  This RN spoke with PA and Ethics team-this is not an ethics case.  SW/PA spoke with  and questions answered and concerns addressed.  Pt up in chair for several hours today.  Staff and  assisted with feeding pt.  Taking pills with applesauce.  Pt answering simple questions.  2 smear stools today- Miralax given per .  Turning Q 2 hours.  Barrier cream to labia and buttocks.  Will continue with POC.       Problem: Plan of Care - These are the overarching goals to be used throughout the patient stay.    Goal: Plan of Care Review  Description: The Plan of Care Review/Shift note should be completed every shift.  The Outcome Evaluation is a brief statement about your assessment that the patient is improving, declining, or no change.  This information will be displayed automatically on your shift note.  The Plan of Care Review/Shift note should be completed every shift.  The Outcome Evaluation is a brief statement about your assessment that the patient is improving, declining, or no change.  This information will be displayed automatically on your shift  note.  Outcome: Adequate for Care Transition  Goal: Patient-Specific Goal (Individualized)  Description: You can add care plan individualizations to a care plan. Examples of Individualization might be:  \"Parent requests to be called daily at 9am for status\", \"I have a hard time hearing out of my right ear\", or \"Do not touch me to wake me up as it startles  me\".  Outcome: Adequate for Care Transition  Goal: Absence of Hospital-Acquired Illness or Injury  Outcome: Adequate for Care Transition  Intervention: Identify and Manage Fall Risk  Recent Flowsheet Documentation  Taken 11/13/2023 1900 by Divina Good, RN  Safety Promotion/Fall Prevention:   safety " round/check completed   assistive device/personal items within reach   clutter free environment maintained   increased rounding and observation   increase visualization of patient   lighting adjusted   nonskid shoes/slippers when out of bed   patient and family education   room near nurse's station   room organization consistent   treat reversible contributory factors  Taken 11/13/2023 1600 by Divnia Good RN  Safety Promotion/Fall Prevention:   assistive device/personal items within reach   clutter free environment maintained   increased rounding and observation   increase visualization of patient   lighting adjusted   nonskid shoes/slippers when out of bed   patient and family education   room near nurse's station   treat reversible contributory factors   room organization consistent  Taken 11/13/2023 1100 by Divina Good RN  Safety Promotion/Fall Prevention:   assistive device/personal items within reach   clutter free environment maintained   increased rounding and observation   increase visualization of patient   lighting adjusted   nonskid shoes/slippers when out of bed   patient and family education   room near nurse's station   room organization consistent   treat reversible contributory factors  Intervention: Prevent Skin Injury  Recent Flowsheet Documentation  Taken 11/13/2023 2115 by Divina Good RN  Body Position:   turned   right   side-lying  Taken 11/13/2023 1900 by Divina Good RN  Body Position:   turned   left   side-lying  Taken 11/13/2023 0853 by Divina Good RN  Body Position:   turned   side-lying 30 degrees   left  Intervention: Prevent Infection  Recent Flowsheet Documentation  Taken 11/13/2023 2000 by Divina Good RN  Infection Prevention:   environmental surveillance performed   hand hygiene promoted   personal protective equipment utilized   rest/sleep promoted   single patient room provided   visitors restricted/screened  Taken 11/13/2023 1600 by Divina Good  RN  Infection Prevention:   environmental surveillance performed   hand hygiene promoted   rest/sleep promoted   single patient room provided   visitors restricted/screened  Taken 11/13/2023 1100 by Divina Good RN  Infection Prevention:   environmental surveillance performed   hand hygiene promoted   rest/sleep promoted   single patient room provided   visitors restricted/screened  Goal: Optimal Comfort and Wellbeing  Outcome: Adequate for Care Transition  Goal: Readiness for Transition of Care  Outcome: Adequate for Care Transition   Goal Outcome Evaluation:

## 2023-11-15 ENCOUNTER — APPOINTMENT (OUTPATIENT)
Dept: OCCUPATIONAL THERAPY | Facility: CLINIC | Age: 78
DRG: 054 | End: 2023-11-15
Attending: INTERNAL MEDICINE
Payer: MEDICARE

## 2023-11-15 LAB
ALBUMIN SERPL BCG-MCNC: 3.5 G/DL (ref 3.5–5.2)
ALP SERPL-CCNC: 60 U/L (ref 40–150)
ALT SERPL W P-5'-P-CCNC: 17 U/L (ref 0–50)
ANION GAP SERPL CALCULATED.3IONS-SCNC: 9 MMOL/L (ref 7–15)
AST SERPL W P-5'-P-CCNC: 15 U/L (ref 0–45)
BASOPHILS # BLD AUTO: 0 10E3/UL (ref 0–0.2)
BASOPHILS NFR BLD AUTO: 0 %
BILIRUB SERPL-MCNC: 0.2 MG/DL
BUN SERPL-MCNC: 26.3 MG/DL (ref 8–23)
CALCIUM SERPL-MCNC: 9 MG/DL (ref 8.8–10.2)
CHLORIDE SERPL-SCNC: 106 MMOL/L (ref 98–107)
CREAT SERPL-MCNC: 0.62 MG/DL (ref 0.51–0.95)
DEPRECATED HCO3 PLAS-SCNC: 24 MMOL/L (ref 22–29)
EGFRCR SERPLBLD CKD-EPI 2021: >90 ML/MIN/1.73M2
EOSINOPHIL # BLD AUTO: 0 10E3/UL (ref 0–0.7)
EOSINOPHIL NFR BLD AUTO: 1 %
ERYTHROCYTE [DISTWIDTH] IN BLOOD BY AUTOMATED COUNT: 13.5 % (ref 10–15)
GLUCOSE SERPL-MCNC: 94 MG/DL (ref 70–99)
HCT VFR BLD AUTO: 34.2 % (ref 35–47)
HGB BLD-MCNC: 10.9 G/DL (ref 11.7–15.7)
IMM GRANULOCYTES # BLD: 0 10E3/UL
IMM GRANULOCYTES NFR BLD: 0 %
LYMPHOCYTES # BLD AUTO: 1.1 10E3/UL (ref 0.8–5.3)
LYMPHOCYTES NFR BLD AUTO: 22 %
MCH RBC QN AUTO: 29.7 PG (ref 26.5–33)
MCHC RBC AUTO-ENTMCNC: 31.9 G/DL (ref 31.5–36.5)
MCV RBC AUTO: 93 FL (ref 78–100)
MONOCYTES # BLD AUTO: 0.4 10E3/UL (ref 0–1.3)
MONOCYTES NFR BLD AUTO: 8 %
NEUTROPHILS # BLD AUTO: 3.3 10E3/UL (ref 1.6–8.3)
NEUTROPHILS NFR BLD AUTO: 69 %
NRBC # BLD AUTO: 0 10E3/UL
NRBC BLD AUTO-RTO: 0 /100
PLATELET # BLD AUTO: 139 10E3/UL (ref 150–450)
POTASSIUM SERPL-SCNC: 3.9 MMOL/L (ref 3.4–5.3)
PROT SERPL-MCNC: 5.5 G/DL (ref 6.4–8.3)
RBC # BLD AUTO: 3.67 10E6/UL (ref 3.8–5.2)
SODIUM SERPL-SCNC: 139 MMOL/L (ref 135–145)
WBC # BLD AUTO: 4.9 10E3/UL (ref 4–11)

## 2023-11-15 PROCEDURE — 36415 COLL VENOUS BLD VENIPUNCTURE: CPT | Performed by: STUDENT IN AN ORGANIZED HEALTH CARE EDUCATION/TRAINING PROGRAM

## 2023-11-15 PROCEDURE — 85025 COMPLETE CBC W/AUTO DIFF WBC: CPT | Performed by: STUDENT IN AN ORGANIZED HEALTH CARE EDUCATION/TRAINING PROGRAM

## 2023-11-15 PROCEDURE — L4396 STATIC OR DYNAMI AFO PRE CST: HCPCS

## 2023-11-15 PROCEDURE — 250N000013 HC RX MED GY IP 250 OP 250 PS 637: Performed by: INTERNAL MEDICINE

## 2023-11-15 PROCEDURE — 250N000013 HC RX MED GY IP 250 OP 250 PS 637: Performed by: STUDENT IN AN ORGANIZED HEALTH CARE EDUCATION/TRAINING PROGRAM

## 2023-11-15 PROCEDURE — 258N000003 HC RX IP 258 OP 636: Performed by: PSYCHIATRY & NEUROLOGY

## 2023-11-15 PROCEDURE — 250N000011 HC RX IP 250 OP 636: Mod: JZ | Performed by: PSYCHIATRY & NEUROLOGY

## 2023-11-15 PROCEDURE — 250N000012 HC RX MED GY IP 250 OP 636 PS 637: Performed by: STUDENT IN AN ORGANIZED HEALTH CARE EDUCATION/TRAINING PROGRAM

## 2023-11-15 PROCEDURE — 97530 THERAPEUTIC ACTIVITIES: CPT | Mod: GO

## 2023-11-15 PROCEDURE — 99232 SBSQ HOSP IP/OBS MODERATE 35: CPT | Performed by: STUDENT IN AN ORGANIZED HEALTH CARE EDUCATION/TRAINING PROGRAM

## 2023-11-15 PROCEDURE — 82374 ASSAY BLOOD CARBON DIOXIDE: CPT | Performed by: STUDENT IN AN ORGANIZED HEALTH CARE EDUCATION/TRAINING PROGRAM

## 2023-11-15 PROCEDURE — 120N000005 HC R&B MS OVERFLOW UMMC

## 2023-11-15 RX ORDER — DIPHENHYDRAMINE HYDROCHLORIDE 50 MG/ML
50 INJECTION INTRAMUSCULAR; INTRAVENOUS
Status: ACTIVE | OUTPATIENT
Start: 2023-11-15 | End: 2023-11-17

## 2023-11-15 RX ORDER — HEPARIN SODIUM,PORCINE 10 UNIT/ML
5-20 VIAL (ML) INTRAVENOUS DAILY PRN
Status: DISCONTINUED | OUTPATIENT
Start: 2023-11-15 | End: 2023-11-28 | Stop reason: HOSPADM

## 2023-11-15 RX ORDER — MEPERIDINE HYDROCHLORIDE 25 MG/ML
25 INJECTION INTRAMUSCULAR; INTRAVENOUS; SUBCUTANEOUS EVERY 30 MIN PRN
Status: ACTIVE | OUTPATIENT
Start: 2023-11-15 | End: 2023-11-17

## 2023-11-15 RX ORDER — ALBUTEROL SULFATE 0.83 MG/ML
2.5 SOLUTION RESPIRATORY (INHALATION)
Status: ACTIVE | OUTPATIENT
Start: 2023-11-15 | End: 2023-11-17

## 2023-11-15 RX ORDER — HEPARIN SODIUM (PORCINE) LOCK FLUSH IV SOLN 100 UNIT/ML 100 UNIT/ML
5 SOLUTION INTRAVENOUS
Status: DISCONTINUED | OUTPATIENT
Start: 2023-11-15 | End: 2023-11-28 | Stop reason: HOSPADM

## 2023-11-15 RX ORDER — ALBUTEROL SULFATE 90 UG/1
1-2 AEROSOL, METERED RESPIRATORY (INHALATION)
Status: ACTIVE | OUTPATIENT
Start: 2023-11-15 | End: 2023-11-17

## 2023-11-15 RX ORDER — EPINEPHRINE 1 MG/ML
0.3 INJECTION, SOLUTION, CONCENTRATE INTRAVENOUS EVERY 5 MIN PRN
Status: ACTIVE | OUTPATIENT
Start: 2023-11-15 | End: 2023-11-17

## 2023-11-15 RX ORDER — LORAZEPAM 0.5 MG/1
0.5 TABLET ORAL EVERY 4 HOURS PRN
Status: ACTIVE | OUTPATIENT
Start: 2023-11-15 | End: 2023-11-15

## 2023-11-15 RX ORDER — LORAZEPAM 2 MG/ML
0.5 INJECTION INTRAMUSCULAR EVERY 4 HOURS PRN
Status: DISCONTINUED | OUTPATIENT
Start: 2023-11-15 | End: 2023-11-15

## 2023-11-15 RX ORDER — METHYLPREDNISOLONE SODIUM SUCCINATE 125 MG/2ML
125 INJECTION, POWDER, LYOPHILIZED, FOR SOLUTION INTRAMUSCULAR; INTRAVENOUS
Status: ACTIVE | OUTPATIENT
Start: 2023-11-15 | End: 2023-11-17

## 2023-11-15 RX ADMIN — BUSPIRONE HYDROCHLORIDE 30 MG: 30 TABLET ORAL at 08:03

## 2023-11-15 RX ADMIN — PANTOPRAZOLE SODIUM 40 MG: 40 TABLET, DELAYED RELEASE ORAL at 16:32

## 2023-11-15 RX ADMIN — LEVETIRACETAM 1250 MG: 750 TABLET, FILM COATED ORAL at 19:13

## 2023-11-15 RX ADMIN — ALBUTEROL SULFATE 2 PUFF: 90 AEROSOL, METERED RESPIRATORY (INHALATION) at 08:08

## 2023-11-15 RX ADMIN — SULFAMETHOXAZOLE AND TRIMETHOPRIM 1 TABLET: 800; 160 TABLET ORAL at 08:03

## 2023-11-15 RX ADMIN — PANTOPRAZOLE SODIUM 40 MG: 40 TABLET, DELAYED RELEASE ORAL at 08:02

## 2023-11-15 RX ADMIN — LACOSAMIDE 100 MG: 50 TABLET, FILM COATED ORAL at 19:13

## 2023-11-15 RX ADMIN — BUSPIRONE HYDROCHLORIDE 30 MG: 30 TABLET ORAL at 19:13

## 2023-11-15 RX ADMIN — ALBUTEROL SULFATE 2 PUFF: 90 AEROSOL, METERED RESPIRATORY (INHALATION) at 14:01

## 2023-11-15 RX ADMIN — DEXAMETHASONE 2 MG: 2 TABLET ORAL at 08:03

## 2023-11-15 RX ADMIN — RIVAROXABAN 20 MG: 10 TABLET, FILM COATED ORAL at 16:32

## 2023-11-15 RX ADMIN — LEVETIRACETAM 1250 MG: 750 TABLET, FILM COATED ORAL at 08:03

## 2023-11-15 RX ADMIN — SODIUM CHLORIDE 700 MG: 9 INJECTION, SOLUTION INTRAVENOUS at 12:44

## 2023-11-15 RX ADMIN — FLUOXETINE 40 MG: 20 CAPSULE ORAL at 08:03

## 2023-11-15 RX ADMIN — ALBUTEROL SULFATE 2 PUFF: 90 AEROSOL, METERED RESPIRATORY (INHALATION) at 19:14

## 2023-11-15 RX ADMIN — THERA TABS 1 TABLET: TAB at 08:03

## 2023-11-15 RX ADMIN — AMLODIPINE BESYLATE 5 MG: 5 TABLET ORAL at 08:02

## 2023-11-15 RX ADMIN — LACOSAMIDE 100 MG: 50 TABLET, FILM COATED ORAL at 08:06

## 2023-11-15 ASSESSMENT — ACTIVITIES OF DAILY LIVING (ADL)
ADLS_ACUITY_SCORE: 57
ADLS_ACUITY_SCORE: 57
ADLS_ACUITY_SCORE: 61
ADLS_ACUITY_SCORE: 65
ADLS_ACUITY_SCORE: 57
ADLS_ACUITY_SCORE: 61

## 2023-11-15 NOTE — PROGRESS NOTES
Brief Follow up note    Discussed care with Dr. Yesenia Agudelo.  She will look into a possible close video visit to coordinate care after anticipated long term discharge.  Also discussed possibility of activating medication involvement in cognitive therapy:  Will discuss feasibility and timing with Dr. Stephanie Baker.  Continue to recommend early involvement of rehabilitation psychology and palliative care to assist with patient and family adjustment and address behavioral, mood, and cognitive issues precluding PT and OT goal achivement.

## 2023-11-15 NOTE — PROGRESS NOTES
[facility]    Medicine Progress Note - Hospitalist Service, GOLD TEAM 10    Date of Admission:  10/6/2023    Assessment & Plan   Olga Bailey is a 78 year old female admitted on 10/6/2023. She has history of L frontoparietal glioblastoma s/p resection, chemotherapy and radiation with remission in 2021, recently found to have recurrence in September 2023, with baseline cognitive and functional impairment, h/o seizures, h/o DVT on xarelto, HTN, and depression who presented 10/6/23 with acute worsening of baseline aphasia as well as new facial twitching. Admitted to internal medicine for further work up and management. Neurology consulted and patient found to be having partial seizures for which lacosamide was added to keppra with improvement in seizure activity. Continued radiotherapy while on [facility] but has now transferred to [place] for concomitant bevacizumab. Tolerated bevacizumab well however developed worsening headaches and tiredness at reduced dose of steroids so increased back up to 4 mg BID per rad onc. Completed 10 days of radiation on 10/25/23. Has received ongoing bevacizumab per treatment cycle.  Medically ready to discharge to hopefully TCU.    Interval Changes:  -Med ready for discharge  -Bevacizumab infusion today  -Repeat CBC, CMP  -Paged orthotics team for foot drop brace    Discharge planning  Medically stable for discharge pending safe discharge plan  - 11/13 - primary MD had long discussion with pts  and CAMRON Miranda about discharge planning. Discussed that unfortunately TCU is not an option at this point and we need to move forward with LTC.   - 11/9 - discussed at weekly SWAT meeting due to difficult discharge.  - Recommended looking into options for increasing care at assisted living vs LTC. This was discussed with pt, daughter and  by SW on 11/9.  - RNCC/SW team working on safe discharge plan, greatly appreciate assistance.  Looking at community TCU options and LTC, has been refused by  TCU multiple times.  - PM&R consulted this admission to assist with evaluation for appropriate dispo and assess rehab potential, last evaluated on 11/13     Partial Seizure, previously controlled -no further seizure  Chronic aphasia   Difficulty Swallowing  -Presented with acute (< 1 day) worsening of aphasia and new facial twitching with abnormal mouth movements concerning for partial seizure.  She underwent stroke eval in the ED which was reassuring against acute CVA  -Given recent recurrence of glioblastoma, there was concern for spread or advance of disease, though both CT and MRI were reassuring to stability of current malignancy from last imaging (9/2023).   -Neurology consulted and felt movements were consistent with partial seizure, lacosamide was added, though subsequent EEG showed some degree of persistent seizure activity, and further brief clinical seizures were observed.   Plan:  >Neurologist discussed risk/benefit of a possible third antiepileptic medication (which could carry a risk of heavier sedation), the family elected to remain on the two current medications for quality of life reasons.  - Neurology consulted this admission, not currently seeing daily  - Continue levetiracetam 1250 mg BID   - Lacosamide 100 MG BID - transitioned to oral on 10/16  - Could increase to 150mg BID if increased seizure burden occurs, but has been stable on current dosing   - May reconsider third agent should seizure burden change drastically (ie [place] Mal, etc)   - Lorazepam PRN for any seizure activity > 2 minutes or increased clusters of seizure like activity      H/o L frontoparietal glioblastoma s/p resection, chemotherapy and radiation (completed May 2021), with recurrence of malignancy 2023   Cognitive and functional impairment due to glioblastoma and chemoradiation   Seen by oncology on this visit, repeat imaging stable from September. Started on  trial of steroid, eval symptom improvement.   - Oncology consulted, appreciate input - signed off, s/p bevacizumab 10/18, 11/1, 11/15 - next due date will need to discuss with oncology  - Rad/Onc consulted, RT started on 10/12 for 10 fractions - finished on 10/25  - Dexamethasone 4mg IV BID  (10/9 - 10/25) - headache resolved and no new neuro symptoms. Start tapering as of 10/26 - 2 mg BID x 2 weeks (10/26-11/9), 2 mg daily x 2 weeks (11/10-11/23), then stop (taper ordered)  - TMP-SMX daily for PCP PPX (pt had hx of PJP)   - PT/OT consulted, appreciate input  -Likely LTC on discharge, significant discussion with primary MD, PMR and PT/OT, Case management on 11/13. [facility] unable to accept patient and recommendation per specialties for LTC placement on discharge     Likely aspiration pneumonitis: resolved  - brianna albuterol MDI q6H while awake   - DuoNeb QID PRN   - SLP consult completed, appreciate input. Soft diet level 6 with thin liquids.     Depression - Continue PTA Buspirone and Fluoxetine      H/o DVT - Continue PTA rivaroxaban      HTN - Continue PTA Amlodipine      Hemorrhoids   -topical preparation H  -monitor           Diet: Snacks/Supplements Adult: Other; Send [employer] Cup with dinner tray and allow pt/RN to order prn also (Also likes orange if in stock. NO van or ora); With Meals  Regular Diet Adult Thin Liquids (level 0)    DVT Prophylaxis: DOAC  Martino Catheter: Not present  Lines: None     Cardiac Monitoring: None  Code Status: Full Code      Clinically Significant Risk Factors                  # Hypertension: Noted on problem list                   Disposition Plan     Expected Discharge Date: 11/16/2023    Discharge Delays: Placement - TCU  Destination: assisted living;nursing home  Discharge Comments: LTC - next chemo dose is on 11/15  Bevacizumab x7xedpw (last dose 11/1), done w/ radiation on 10/25,          João Kent MD  Hospitalist Service, GOLD TEAM 10  [facility]  [facility]  Securely message with [employer] (more info)  Text page via [employer] Paging/Directory   See signed in provider for up to date coverage information  ______________________________________________________________________    Interval History   No acute events overnight. She denies any new symptoms. No fever or chills. No chest pain. No shortness of breath. No abdominal pain. Discussed plan for bevicizumab today.     Physical Exam   Vital Signs: Temp: 97.9  F (36.6  C) Temp src: Axillary BP: 128/68 Pulse: 64   Resp: 18 SpO2: 93 % O2 Device: None (Room air)    Weight: 154 lbs 5.15 oz    General Appearance: No acute distress, lying in bed  Respiratory: CTAB. No increased WOB.  Cardiovascular: RRR. No m/  GI: Soft. Non-tender. Non-distended.  Skin: No rash.  Neuro: right upper extremity weakness 2/5 strength in upper and lower right extremities .  strength present in left hand and able to plantar and dorsiflex left foot  Other: AOx4. Flat affect.    Medical Decision Making       55 MINUTES SPENT BY ME on the date of service doing chart review, history, exam, documentation & further activities per the note.      Data         Imaging results reviewed over the past 24 hrs:   No results found for this or any previous visit (from the past 24 hour(s)).

## 2023-11-15 NOTE — PROGRESS NOTES
Care Management Follow Up    Length of Stay (days): 39    Expected Discharge Date: 11/16/2023     Concerns to be Addressed: discharge planning     Patient plan of care discussed at interdisciplinary rounds: Yes    Anticipated Discharge Disposition: Long Term Care  Disposition Comments: n/a  Anticipated Discharge Services:  (TBD)  Anticipated Discharge DME:  (TBD)    Patient/family educated on Medicare website which has current facility and service quality ratings: yes  Education Provided on the Discharge Plan: Yes  Patient/Family in Agreement with the Plan: yes    Referrals Placed by CM/SW:  (LongTerm Care)  Private pay costs discussed: private room/amenity fees    Additional Information:  CAMRON received a call from [facility] saying they declined pt, Olga, for being to complex for what they can handle at this time. CAMRON called Caitlyn back asked what the complexities were.  Ellyn thinks it is due to her chemo-cancer.  CAMRON asked Ellyn to speak with RN that reviewed the referral and asked for what complexities t she was denied and to calls SW back.  RN is gone today Ellyn said she will give me a call back tomorrow.    CAMRON called Alana RN at [facility], during the conversation data said that patient's  called the facility and spoke to a Yudy saying that he refused to have his wife go to an LTC.  Yudy told [facility] long-term care and TCU's are connected so that Olga would still get the therapies she needs.  Yudy says that she emailed [facility] about their long-term care bed opening for Olga.  CAMRON is hoping that will help secure placement for Olga.  CAMRON received a page saying that patient's  would like to make a follow-up for long-term care placement.  CAMRON and MD have been discussing  SW stopped into Olga's room 2 times.  Paul was not in Olga's room  the second time SW stopped by.  CAMRON talked with Olga and let her know that she was working very hard to  try to get find a place for her because she knows how badly she would like to be out of the hospital.  SW will stop in again tomorrow morning to talk to Paul.  CAMRON let MD know that patient was denied at the long-term care facility.     SW to follow and assist with any other discharge needs that may arise.  DAYNA Peres   5A beds:5220-40  5C beds 5417-32 (no BMT pt's)     Phone: [phone]  Pager: [phone]

## 2023-11-15 NOTE — PROGRESS NOTES
S: Pt seen bedside with OT in the room for fitting/delivery and instructions. O: I see the EPIC order for the Foot drop assess for brace due to no Dx. A: I talked with the  and he said she needs something when in bed to prevent plantar flexion contractures. She apparently has not walked for weeks. The OTs were using a stander when I arrived for therapy. She was fit with the bilateral PRAFOs and the instruction sheet was put on the table in her room on the plugged in cell phone ( Presumably one of her visitors) P: FU PRN. G: The goal is to maintain ROM at the ankle.  Electronically signed Héctor To , LPO.

## 2023-11-15 NOTE — PLAN OF CARE
"/68 (BP Location: Right arm, Cuff Size: Adult Regular)   Pulse 64   Temp 97.9  F (36.6  C) (Axillary)   Resp 18   Ht 1.702 m (5' 7\")   Wt 70 kg (154 lb 5.2 oz)   SpO2 93%   BMI 24.17 kg/m      Pt slept comfortably during the night between cares. VSS on room air, afebrile. Denies pain and nausea. Pt turned and repositioned q2h. Buttocks and between buttocks are blanchable red. Barrier cream applied with incontinence care. Pt had one incontinent bm overnight. Purewick in place. Waiting on placement. Continue with current POC.    Goal Outcome Evaluation:    Problem: Plan of Care - These are the overarching goals to be used throughout the patient stay.    Goal: Absence of Hospital-Acquired Illness or Injury  Intervention: Identify and Manage Fall Risk  Recent Flowsheet Documentation  Taken 11/15/2023 0400 by Ruth Doyle RN  Safety Promotion/Fall Prevention:   safety round/check completed   assistive device/personal items within reach   clutter free environment maintained   increased rounding and observation   increase visualization of patient   nonskid shoes/slippers when out of bed   patient and family education   room organization consistent  Taken 11/15/2023 0200 by Ruth Doyle RN  Safety Promotion/Fall Prevention:   safety round/check completed   assistive device/personal items within reach   clutter free environment maintained   increased rounding and observation   increase visualization of patient   nonskid shoes/slippers when out of bed   patient and family education   room organization consistent  Taken 11/15/2023 0000 by Ruth Doyle RN  Safety Promotion/Fall Prevention:   safety round/check completed   assistive device/personal items within reach   clutter free environment maintained   increased rounding and observation   increase visualization of patient   nonskid shoes/slippers when out of bed   patient and family education   room organization consistent  Taken 11/14/2023 2200 by " Tepfer, Ruth, RN  Safety Promotion/Fall Prevention:   safety round/check completed   assistive device/personal items within reach   clutter free environment maintained   increased rounding and observation   increase visualization of patient   nonskid shoes/slippers when out of bed   patient and family education   room organization consistent  Taken 11/14/2023 2000 by Ruth Doyle RN  Safety Promotion/Fall Prevention:   safety round/check completed   assistive device/personal items within reach   clutter free environment maintained   increased rounding and observation   increase visualization of patient   nonskid shoes/slippers when out of bed   patient and family education   room organization consistent  Intervention: Prevent Skin Injury  Recent Flowsheet Documentation  Taken 11/15/2023 0600 by Ruth Doyle RN  Body Position:   turned   right   left  Taken 11/15/2023 0400 by Ruth Doyle RN  Body Position:   turned   left  Taken 11/15/2023 0200 by Ruth Doyle RN  Body Position:   turned   right  Taken 11/15/2023 0056 by Ruth Doyle RN  Body Position:   turned   left  Taken 11/14/2023 2200 by Ruth Doyle RN  Body Position:   turned   right  Taken 11/14/2023 2000 by Ruth Doyle RN  Body Position:   turned   left  Intervention: Prevent and Manage VTE (Venous Thromboembolism) Risk  Recent Flowsheet Documentation  Taken 11/14/2023 2000 by Ruth Doyle RN  VTE Prevention/Management:   compression stockings off   SCDs (sequential compression devices) off  Intervention: Prevent Infection  Recent Flowsheet Documentation  Taken 11/14/2023 2000 by Ruth Doyle RN  Infection Prevention:   environmental surveillance performed   hand hygiene promoted   personal protective equipment utilized   rest/sleep promoted   single patient room provided   visitors restricted/screened  Goal: Absence of Hospital-Acquired Illness or Injury  Intervention: Identify and Manage Fall Risk  Recent Flowsheet  Documentation  Taken 11/15/2023 0400 by Ruth Doyle RN  Safety Promotion/Fall Prevention:   safety round/check completed   assistive device/personal items within reach   clutter free environment maintained   increased rounding and observation   increase visualization of patient   nonskid shoes/slippers when out of bed   patient and family education   room organization consistent  Taken 11/15/2023 0200 by Ruth Doyle RN  Safety Promotion/Fall Prevention:   safety round/check completed   assistive device/personal items within reach   clutter free environment maintained   increased rounding and observation   increase visualization of patient   nonskid shoes/slippers when out of bed   patient and family education   room organization consistent  Taken 11/15/2023 0000 by Ruth Doyle RN  Safety Promotion/Fall Prevention:   safety round/check completed   assistive device/personal items within reach   clutter free environment maintained   increased rounding and observation   increase visualization of patient   nonskid shoes/slippers when out of bed   patient and family education   room organization consistent  Taken 11/14/2023 2200 by Ruth Doyle RN  Safety Promotion/Fall Prevention:   safety round/check completed   assistive device/personal items within reach   clutter free environment maintained   increased rounding and observation   increase visualization of patient   nonskid shoes/slippers when out of bed   patient and family education   room organization consistent  Taken 11/14/2023 2000 by Ruth Doyle RN  Safety Promotion/Fall Prevention:   safety round/check completed   assistive device/personal items within reach   clutter free environment maintained   increased rounding and observation   increase visualization of patient   nonskid shoes/slippers when out of bed   patient and family education   room organization consistent  Intervention: Prevent Skin Injury  Recent Flowsheet Documentation  Taken  11/15/2023 0600 by Ruth Doyle RN  Body Position:   turned   right   left  Taken 11/15/2023 0400 by Ruth Doyle RN  Body Position:   turned   left  Taken 11/15/2023 0200 by Ruth Doyle RN  Body Position:   turned   right  Taken 11/15/2023 0056 by Ruth Doyle RN  Body Position:   turned   left  Taken 11/14/2023 2200 by Ruth Doyle RN  Body Position:   turned   right  Taken 11/14/2023 2000 by Ruth Doyle RN  Body Position:   turned   left  Intervention: Prevent and Manage VTE (Venous Thromboembolism) Risk  Recent Flowsheet Documentation  Taken 11/14/2023 2000 by Ruth Doyle RN  VTE Prevention/Management:   compression stockings off   SCDs (sequential compression devices) off  Intervention: Prevent Infection  Recent Flowsheet Documentation  Taken 11/14/2023 2000 by Ruth Doyle RN  Infection Prevention:   environmental surveillance performed   hand hygiene promoted   personal protective equipment utilized   rest/sleep promoted   single patient room provided   visitors restricted/screened     Problem: Adult Inpatient Plan of Care  Goal: Absence of Hospital-Acquired Illness or Injury  Outcome: Progressing  Intervention: Identify and Manage Fall Risk  Recent Flowsheet Documentation  Taken 11/15/2023 0400 by Ruth Doyle RN  Safety Promotion/Fall Prevention:   safety round/check completed   assistive device/personal items within reach   clutter free environment maintained   increased rounding and observation   increase visualization of patient   nonskid shoes/slippers when out of bed   patient and family education   room organization consistent  Taken 11/15/2023 0200 by Ruth Doyle RN  Safety Promotion/Fall Prevention:   safety round/check completed   assistive device/personal items within reach   clutter free environment maintained   increased rounding and observation   increase visualization of patient   nonskid shoes/slippers when out of bed   patient and family education   room  organization consistent  Taken 11/15/2023 0000 by Ruth Doyle RN  Safety Promotion/Fall Prevention:   safety round/check completed   assistive device/personal items within reach   clutter free environment maintained   increased rounding and observation   increase visualization of patient   nonskid shoes/slippers when out of bed   patient and family education   room organization consistent  Taken 11/14/2023 2200 by Ruth Doyle RN  Safety Promotion/Fall Prevention:   safety round/check completed   assistive device/personal items within reach   clutter free environment maintained   increased rounding and observation   increase visualization of patient   nonskid shoes/slippers when out of bed   patient and family education   room organization consistent  Taken 11/14/2023 2000 by Ruth Doyle RN  Safety Promotion/Fall Prevention:   safety round/check completed   assistive device/personal items within reach   clutter free environment maintained   increased rounding and observation   increase visualization of patient   nonskid shoes/slippers when out of bed   patient and family education   room organization consistent  Intervention: Prevent Skin Injury  Recent Flowsheet Documentation  Taken 11/15/2023 0600 by Ruth Doyle RN  Body Position:   turned   right   left  Taken 11/15/2023 0400 by Ruth Doyle RN  Body Position:   turned   left  Taken 11/15/2023 0200 by Ruth Doyle RN  Body Position:   turned   right  Taken 11/15/2023 0056 by Ruth Doyle RN  Body Position:   turned   left  Taken 11/14/2023 2200 by Ruth Doyle RN  Body Position:   turned   right  Taken 11/14/2023 2000 by Ruth Doyle RN  Body Position:   turned   left  Intervention: Prevent and Manage VTE (Venous Thromboembolism) Risk  Recent Flowsheet Documentation  Taken 11/14/2023 2000 by Ruth Doyle RN  VTE Prevention/Management:   compression stockings off   SCDs (sequential compression devices) off  Intervention: Prevent  Infection  Recent Flowsheet Documentation  Taken 11/14/2023 2000 by Ruth Doyle RN  Infection Prevention:   environmental surveillance performed   hand hygiene promoted   personal protective equipment utilized   rest/sleep promoted   single patient room provided   visitors restricted/screened  Goal: Optimal Comfort and Wellbeing  Outcome: Progressing  Goal: Readiness for Transition of Care  Outcome: Progressing     Problem: Seizure Disorder Comorbidity  Goal: Maintenance of Seizure Control  Intervention: Maintain Seizure-Symptom Control  Recent Flowsheet Documentation  Taken 11/14/2023 2000 by Ruth Doyle RN  Seizure Precautions: clutter-free environment maintained     Problem: Hypertension Comorbidity  Goal: Blood Pressure in Desired Range  Intervention: Maintain Blood Pressure Management  Recent Flowsheet Documentation  Taken 11/14/2023 2000 by Ruth Doyle RN  Medication Review/Management:   medications reviewed   dosing adjusted     Problem: Thought Process Alteration  Goal: Optimal Thought Clarity  Intervention: Minimize Safety Risk and Altered Thought  Recent Flowsheet Documentation  Taken 11/15/2023 0000 by Ruth Doyle RN  Enhanced Safety Measures: room near unit station  Taken 11/14/2023 2000 by Ruth Doyle RN  Enhanced Safety Measures: room near unit station     Problem: Swallowing Impairment  Goal: Optimal Eating/Swallowing without Aspiration  Intervention: Optimize Eating and Swallowing  Recent Flowsheet Documentation  Taken 11/14/2023 2000 by Ruth Doyle RN  Aspiration Precautions:   awake/alert before oral intake   distractions minimized during oral intake   oral hygiene care promoted

## 2023-11-15 NOTE — PLAN OF CARE
"/69 (BP Location: Right arm)   Pulse 72   Temp 97.3  F (36.3  C) (Axillary)   Resp 18   Ht 1.702 m (5' 7\")   Wt 70 kg (154 lb 5.2 oz)   SpO2 95%   BMI 24.17 kg/m      HTN, OVSS on RA. A&O x3, disoriented to time. Denies pain and nausea. Repositioned Q2. Purewick in place and changed out. Bm x1. Waiting for discharge placement. Continue with POC    Problem: Adult Inpatient Plan of Care  Goal: Absence of Hospital-Acquired Illness or Injury  Outcome: Progressing  Intervention: Identify and Manage Fall Risk  Recent Flowsheet Documentation  Taken 11/14/2023 1225 by Capo Mcguire RN  Safety Promotion/Fall Prevention:   safety round/check completed   assistive device/personal items within reach   clutter free environment maintained   increased rounding and observation   increase visualization of patient   nonskid shoes/slippers when out of bed   patient and family education   room organization consistent  Taken 11/14/2023 0821 by Capo Mcguire RN  Safety Promotion/Fall Prevention:   safety round/check completed   assistive device/personal items within reach   clutter free environment maintained   increased rounding and observation   increase visualization of patient   nonskid shoes/slippers when out of bed   patient and family education   room organization consistent  Intervention: Prevent Skin Injury  Recent Flowsheet Documentation  Taken 11/14/2023 1730 by Capo Mcguier RN  Body Position:   turned   right  Taken 11/14/2023 1525 by Capo Mcguire RN  Body Position: (pt found supine. per  pillows were removed for PT)   turned   left  Taken 11/14/2023 1310 by Capo Mcguire RN  Body Position:   turned   left  Taken 11/14/2023 1058 by Capo Mcguire RN  Body Position:   turned   right  Taken 11/14/2023 0821 by Capo Mcguire RN  Body Position:   turned   left  Intervention: Prevent Infection  Recent Flowsheet Documentation  Taken 11/14/2023 1525 by Capo Mcguire RN  Infection Prevention:   " environmental surveillance performed   hand hygiene promoted   personal protective equipment utilized   rest/sleep promoted   single patient room provided   visitors restricted/screened  Taken 11/14/2023 1225 by Capo Mcguire, RN  Infection Prevention:   environmental surveillance performed   hand hygiene promoted   personal protective equipment utilized   rest/sleep promoted   single patient room provided   visitors restricted/screened  Taken 11/14/2023 0821 by Capo Mcguire, RN  Infection Prevention:   environmental surveillance performed   hand hygiene promoted   personal protective equipment utilized   rest/sleep promoted   single patient room provided   visitors restricted/screened  Goal: Optimal Comfort and Wellbeing  Outcome: Progressing  Goal: Readiness for Transition of Care  Outcome: Progressing

## 2023-11-15 NOTE — PLAN OF CARE
"/75 (BP Location: Right arm)   Pulse 72   Temp 97.5  F (36.4  C) (Axillary)   Resp 16   Ht 1.702 m (5' 7\")   Wt 68.9 kg (151 lb 14.4 oz)   SpO2 94%   BMI 23.79 kg/m      AVSS on RA. A&O x4. Up independently. Denies pain and nausea. Repositioned regularly, but pt declined turning this afternoon. Waiting for placement. Continue with POC    Problem: Adult Inpatient Plan of Care  Goal: Plan of Care Review  Description: The Plan of Care Review/Shift note should be completed every shift.  The Outcome Evaluation is a brief statement about your assessment that the patient is improving, declining, or no change.  This information will be displayed automatically on your shift  note.  Outcome: Progressing  Goal: Patient-Specific Goal (Individualized)  Description: You can add care plan individualizations to a care plan. Examples of Individualization might be:  \"Parent requests to be called daily at 9am for status\", \"I have a hard time hearing out of my right ear\", or \"Do not touch me to wake me up as it startles  me\".  Outcome: Progressing  Goal: Absence of Hospital-Acquired Illness or Injury  Outcome: Progressing  Intervention: Identify and Manage Fall Risk  Recent Flowsheet Documentation  Taken 11/15/2023 1535 by Capo Mcguire, RN  Safety Promotion/Fall Prevention:   safety round/check completed   assistive device/personal items within reach   clutter free environment maintained   increased rounding and observation   increase visualization of patient   nonskid shoes/slippers when out of bed   patient and family education   room organization consistent  Taken 11/15/2023 1200 by Capo Mcguire, RN  Safety Promotion/Fall Prevention:   safety round/check completed   assistive device/personal items within reach   clutter free environment maintained   increased rounding and observation   increase visualization of patient   nonskid shoes/slippers when out of bed   patient and family education   room organization " consistent  Intervention: Prevent Skin Injury  Recent Flowsheet Documentation  Taken 11/15/2023 1200 by Capo Mcguire RN  Body Position: (up in chair)   tilted   other (see comments)  Intervention: Prevent Infection  Recent Flowsheet Documentation  Taken 11/15/2023 1535 by Capo Mcguire RN  Infection Prevention:   environmental surveillance performed   hand hygiene promoted   personal protective equipment utilized   rest/sleep promoted   single patient room provided   visitors restricted/screened  Taken 11/15/2023 1200 by Capo Mcguire RN  Infection Prevention:   environmental surveillance performed   hand hygiene promoted   personal protective equipment utilized   rest/sleep promoted   single patient room provided   visitors restricted/screened  Taken 11/15/2023 0800 by Capo Mcguire RN  Infection Prevention:   environmental surveillance performed   hand hygiene promoted   personal protective equipment utilized   rest/sleep promoted   single patient room provided   visitors restricted/screened  Goal: Optimal Comfort and Wellbeing  Outcome: Progressing  Goal: Readiness for Transition of Care  Outcome: Progressing

## 2023-11-16 ENCOUNTER — APPOINTMENT (OUTPATIENT)
Dept: OCCUPATIONAL THERAPY | Facility: CLINIC | Age: 78
DRG: 054 | End: 2023-11-16
Attending: INTERNAL MEDICINE
Payer: MEDICARE

## 2023-11-16 ENCOUNTER — APPOINTMENT (OUTPATIENT)
Dept: PHYSICAL THERAPY | Facility: CLINIC | Age: 78
DRG: 054 | End: 2023-11-16
Attending: INTERNAL MEDICINE
Payer: MEDICARE

## 2023-11-16 PROCEDURE — 97110 THERAPEUTIC EXERCISES: CPT | Mod: GP | Performed by: PHYSICAL THERAPIST

## 2023-11-16 PROCEDURE — 97110 THERAPEUTIC EXERCISES: CPT | Mod: GO

## 2023-11-16 PROCEDURE — 97530 THERAPEUTIC ACTIVITIES: CPT | Mod: GP | Performed by: PHYSICAL THERAPIST

## 2023-11-16 PROCEDURE — 120N000005 HC R&B MS OVERFLOW UMMC

## 2023-11-16 PROCEDURE — 250N000013 HC RX MED GY IP 250 OP 250 PS 637: Performed by: STUDENT IN AN ORGANIZED HEALTH CARE EDUCATION/TRAINING PROGRAM

## 2023-11-16 PROCEDURE — 250N000012 HC RX MED GY IP 250 OP 636 PS 637: Performed by: STUDENT IN AN ORGANIZED HEALTH CARE EDUCATION/TRAINING PROGRAM

## 2023-11-16 PROCEDURE — 250N000013 HC RX MED GY IP 250 OP 250 PS 637: Performed by: INTERNAL MEDICINE

## 2023-11-16 PROCEDURE — 99232 SBSQ HOSP IP/OBS MODERATE 35: CPT | Performed by: STUDENT IN AN ORGANIZED HEALTH CARE EDUCATION/TRAINING PROGRAM

## 2023-11-16 RX ADMIN — LEVETIRACETAM 1250 MG: 750 TABLET, FILM COATED ORAL at 09:00

## 2023-11-16 RX ADMIN — FLUOXETINE 40 MG: 20 CAPSULE ORAL at 09:01

## 2023-11-16 RX ADMIN — PANTOPRAZOLE SODIUM 40 MG: 40 TABLET, DELAYED RELEASE ORAL at 16:55

## 2023-11-16 RX ADMIN — BUSPIRONE HYDROCHLORIDE 30 MG: 30 TABLET ORAL at 09:01

## 2023-11-16 RX ADMIN — RIVAROXABAN 20 MG: 10 TABLET, FILM COATED ORAL at 16:55

## 2023-11-16 RX ADMIN — ALBUTEROL SULFATE 2 PUFF: 90 AEROSOL, METERED RESPIRATORY (INHALATION) at 14:08

## 2023-11-16 RX ADMIN — ALBUTEROL SULFATE 2 PUFF: 90 AEROSOL, METERED RESPIRATORY (INHALATION) at 09:00

## 2023-11-16 RX ADMIN — THERA TABS 1 TABLET: TAB at 09:01

## 2023-11-16 RX ADMIN — ALBUTEROL SULFATE 2 PUFF: 90 AEROSOL, METERED RESPIRATORY (INHALATION) at 19:00

## 2023-11-16 RX ADMIN — LEVETIRACETAM 1250 MG: 750 TABLET, FILM COATED ORAL at 18:44

## 2023-11-16 RX ADMIN — BUSPIRONE HYDROCHLORIDE 30 MG: 30 TABLET ORAL at 18:44

## 2023-11-16 RX ADMIN — PANTOPRAZOLE SODIUM 40 MG: 40 TABLET, DELAYED RELEASE ORAL at 09:01

## 2023-11-16 RX ADMIN — LACOSAMIDE 100 MG: 50 TABLET, FILM COATED ORAL at 09:01

## 2023-11-16 RX ADMIN — DEXAMETHASONE 2 MG: 2 TABLET ORAL at 09:01

## 2023-11-16 RX ADMIN — AMLODIPINE BESYLATE 5 MG: 5 TABLET ORAL at 09:01

## 2023-11-16 RX ADMIN — LACOSAMIDE 100 MG: 50 TABLET, FILM COATED ORAL at 18:44

## 2023-11-16 RX ADMIN — SULFAMETHOXAZOLE AND TRIMETHOPRIM 1 TABLET: 800; 160 TABLET ORAL at 09:01

## 2023-11-16 ASSESSMENT — ACTIVITIES OF DAILY LIVING (ADL)
ADLS_ACUITY_SCORE: 65

## 2023-11-16 NOTE — PROGRESS NOTES
"CLINICAL NUTRITION SERVICES - ASSESSMENT NOTE     Nutrition Prescription    RECOMMENDATIONS FOR MDs/PROVIDERS TO ORDER:  None at this time     Malnutrition Status:    Patient does not meet two criteria     Recommendations already ordered by Registered Dietitian (RD):  Ordered Gelatein + whip topping @ 10:00 and 2:00   Continue magic cup     Future/Additional Recommendations:  Will continue to monitor appetite, oral intake, weight and use of supplements      REASON FOR ASSESSMENT  Olga Bailey is a/an 78 year old female assessed by the dietitian for Provider Order -   weight loss, assess need for supplements etc     RD following from 10/17-11/13, had signed off 11/13 as patient was medically stable for discharge and eating well.     NUTRITION HISTORY  Olga and Fahad present during visit. Olga was eating lunch and reported her appetite is ok. It was lower for a couple days early in the week and she was sad/depressed with some news she received. She continues to like magic cup. Fahad reported he was worried about her weight loss and would like to know additional things they  can do.      CURRENT NUTRITION ORDERS  Diet: Regular + thin liquids + magic cup with dinner (290 kcal and 9 g protein)  Intake/Tolerance: mostly , occasional intake 25%   11/16: 100% eggs with cheese, blueberry muffin and oatmeal    LABS  Reviewed - electrolytes WNL, creatinine 0.6 (WNL)    MEDICATIONS  Medications reviewed    ANTHROPOMETRICS  Height: 170.2 cm (5' 7\"[pt unable to stand[), prior height noted as 160 cm (5'3\"   Most Recent Weight: 68.9 kg (151 lb 14.4 oz)    IBW: 61.4 kg vs 52.3 kg  BMI: Normal BMI  Weight History:   11/15/23 68.9 kg (151 lb 14.4 oz)- bed    11/10/23 69.8 kg (153 lb 14.1 oz) - bed    10/24/23 71.1 kg (156 lb 12 oz) - bed    10/18/23 72.3 kg (161 lb 6 oz) - bed   04/18/23 66.2 kg (146 lb)   01/17/23 64.6 kg (142 lb 6.7 oz)   01/17/23 64.6 kg (142 lb 8 oz)   06/03/22 63 kg (139 lb)   04/18/22 65.6 " kg (144 lb 11.2 oz)   02/24/22 67.1 kg (148 lb)   02/14/22 65.8 kg (145 lb)   12/21/21 65.8 kg (145 lb)   12/03/21 68.5 kg (151 lb)   11/16/21 68.4 kg (150 lb 12.8 oz)   10/12/21 68.2 kg (150 lb 6.4 oz)   10/12/21 68.2 kg (150 lb 6.4 oz)   09/14/21 68.9 kg (152 lb)   09/10/21 68.9 kg (152 lb)   08/17/21 70.5 kg (155 lb 6.4 oz)   08/02/21 71.1 kg (156 lb 12.8 oz)   07/30/21 67.4 kg (148 lb 11.2 oz)   07/20/21 65.1 kg (143 lb 9.6 oz)   07/19/21 65.4 kg (144 lb 3.2 oz)   07/12/21 68.9 kg (152 lb)   06/22/21 68.9 kg (151 lb 12.8 oz)   06/05/21 73.7 kg (162 lb 7.7 oz)   05/19/21 69.9 kg (154 lb)   05/10/21 67.1 kg (147 lb 14.9 oz)     Discussed weight history with Petey and that that prior weight history much more consistent with current weights. Also discussed bed scale may not be as accurate due to items on bed, if zero'd and additional factors.        Dosing Weight: 68.3 kg (bed scale weight)     ASSESSED NUTRITION NEEDS  Estimated Energy Needs: 2878-2531+ kcals/day (20 - 25 kcals/kg)  Justification: Maintenance  Estimated Protein Needs: 81+ grams protein/day (1.2 g/kg)  Justification: Increased needs  Estimated Fluid Needs: 0334-5076 mL/day (1 mL/kcal)   Justification: Maintenance        MALNUTRITION  % Intake: Decreased intake does not meet criteria  % Weight Loss: Weight loss does not meet criteria from 10/24-11/15. Difficulty to assess accuracy but weight from 10/18 appears to be an outlier   Subcutaneous Fat Loss: None observed limited to facial   Muscle Loss: Generalized mild-moderate  Fluid Accumulation/Edema: None noted  Malnutrition Diagnosis: Patient does not meet two of the established criteria necessary for diagnosing malnutrition    NUTRITION DIAGNOSIS  No nutrition diagnosis at this time     INTERVENTIONS  Implementation  Nutrition Education:   --discussed barriers to most accurate weight, decrease in muscle mass due to hospitalization and additional labs (creatinine and lymphocytes)      Medical food supplement therapy - will continue to send magic cup, additional trial of gelatein plus   Olga does not like ensure     Goals  Patient to consume % of nutritionally adequate meal trays TID, or the equivalent with supplements/snacks.     Monitoring/Evaluation  Progress toward goals will be monitored and evaluated per protocol.    Chaya Morejon RD, LD  5C/BMT pager: [phone]

## 2023-11-16 NOTE — PROGRESS NOTES
Care Management Follow Up    Length of Stay (days): 40    Expected Discharge Date: 11/16/2023     Concerns to be Addressed: discharge planning     Patient plan of care discussed at interdisciplinary rounds: Yes    Anticipated Discharge Disposition: Long Term Care  Disposition Comments: n/a  Anticipated Discharge Services:  (TBD)  Anticipated Discharge DME:  (TBD)    Patient/family educated on Medicare website which has current facility and service quality ratings: yes  Education Provided on the Discharge Plan: Yes  Patient/Family in Agreement with the Plan: yes    Referrals Placed by CM/SW:  (LongTerm Care)  Private pay costs discussed: private room/amenity fees down for you review list    Additional Information:  CAMRON received a call from Yvette at [facility] the assisted living facility that Olga, pt,came from.  Jailyn would like me to update her with the reasons why Olga was denied from [facility].  CAMRON has not received a return phone call from Ellyn at [facility] yet today 2:12 PM.    CAMRON met with pt's  and talked about the denial from [facility]. CAMRON let Fahad , pt's , know that she is also talking with [facility] and they are assisting SW. Paul asked about hiring an outside company to assist Olga in the [facility],  would [facility] take her back  CAMRON told Fahad she will ask them.     CAMRON received a call from [facility] at 3:00 pm. Per Caitlyn in admissions the RN director of nursing said that the facility has a lot going on with their current pt's.  Due to Olga declining and  the seizures pt has had, they just can't take pt on at this time.   CAMRON let the MD know about reasons for decline. He is going have josé assess her tomorrow so there is updated documentation on seizures and burdens.    3:30pm: CAMRON spoke with Yvette from [place]. She let CAMRON know that the Director of Nursing, Alana, is going to come in tomorrow around 10 am. CAMRON asked her to have Alana  call CAMRON when she gets here. CAMRON asked Yvette about pt hiring outside services to come in would they be able to take her back. Yvette told CAMRON that they do want to take her back, Justin is going to meet after assessment to discuss what they would need to take Olga back and if they could give her the quality of care she deserves.     SW to follow and assist with any other discharge needs that may arise.  DAYNA Peres   5A beds:5220-40  5C beds 5417-32 (no BMT pt's)     Phone: [phone]  Pager: [phone]

## 2023-11-16 NOTE — PROGRESS NOTES
Chemo drug: Bevacizumab  Tolerated: Yes  Intervention: None needed  Response: NA  Plan: Continue with POC

## 2023-11-16 NOTE — PLAN OF CARE
"/85 (BP Location: Right arm)   Pulse 87   Temp 99  F (37.2  C) (Axillary)   Resp 18   Ht 1.702 m (5' 7\")   Wt 68.9 kg (151 lb 14.4 oz)   SpO2 94%   BMI 23.79 kg/m      HTN, OVSS on RA. Up with lift device. Q2 turns. Unable to assess orientation this shift, pt would not respond to questions. Denies pain and nausea. Good appetite and intake. Purewick in place. Waiting for placement for discharge. Continue with POC    Problem: Adult Inpatient Plan of Care  Goal: Absence of Hospital-Acquired Illness or Injury  Outcome: Progressing  Intervention: Identify and Manage Fall Risk  Recent Flowsheet Documentation  Taken 11/16/2023 1210 by Capo Mcguire RN  Safety Promotion/Fall Prevention:   activity supervised   assistive device/personal items within reach   clutter free environment maintained   increased rounding and observation   nonskid shoes/slippers when out of bed   patient and family education   room organization consistent   safety round/check completed  Taken 11/16/2023 0900 by Capo Mcguire RN  Safety Promotion/Fall Prevention:   activity supervised   assistive device/personal items within reach   clutter free environment maintained   increased rounding and observation   nonskid shoes/slippers when out of bed   patient and family education   room organization consistent   safety round/check completed  Intervention: Prevent Skin Injury  Recent Flowsheet Documentation  Taken 11/16/2023 1650 by Capo Mcguire RN  Body Position: (pt educated) refuses positioning  Taken 11/16/2023 1430 by Capo Mcguire RN  Body Position:   turned   left  Taken 11/16/2023 1212 by Capo Mcguire RN  Body Position:   turned   right  Taken 11/16/2023 1000 by Capo Mcguire RN  Body Position:   turned   left  Taken 11/16/2023 0800 by Capo Mcguire RN  Body Position:   turned   right  Intervention: Prevent Infection  Recent Flowsheet Documentation  Taken 11/16/2023 1650 by Capo Mcguire RN  Infection Prevention:   " environmental surveillance performed   hand hygiene promoted   personal protective equipment utilized   rest/sleep promoted   single patient room provided   visitors restricted/screened  Taken 11/16/2023 1210 by Capo Mcguire, RN  Infection Prevention:   environmental surveillance performed   hand hygiene promoted   personal protective equipment utilized   rest/sleep promoted   single patient room provided   visitors restricted/screened  Taken 11/16/2023 0900 by Capo Mcguire, RN  Infection Prevention:   environmental surveillance performed   hand hygiene promoted   personal protective equipment utilized   rest/sleep promoted   single patient room provided   visitors restricted/screened

## 2023-11-16 NOTE — PROGRESS NOTES
[facility]    Medicine Progress Note - Hospitalist Service, GOLD TEAM 10    Date of Admission:  10/6/2023    Assessment & Plan   Olga Bailey is a 78 year old female admitted on 10/6/2023. She has history of L frontoparietal glioblastoma s/p resection, chemotherapy and radiation with remission in 2021, recently found to have recurrence in September 2023, with baseline cognitive and functional impairment, h/o seizures, h/o DVT on xarelto, HTN, and depression who presented 10/6/23 with acute worsening of baseline aphasia as well as new facial twitching. Admitted to internal medicine for further work up and management. Neurology consulted and patient found to be having partial seizures for which lacosamide was added to keppra with improvement in seizure activity. [place] [facility] [place] [facility] for concomitant bevacizumab. Tolerated bevacizumab well however developed worsening headaches and tiredness at reduced dose of steroids so increased back up to 4 mg BID per rad onc. Completed 10 days of radiation on 10/25/23. Has received ongoing bevacizumab per treatment cycle.  Medically ready to discharge to hopefully TCU.    Interval Changes:  -Med ready for discharge  -Discussed with nutrition and repeat discussion with family and re-evaluation    Discharge planning  Medically stable for discharge pending safe discharge plan  - 11/13 - primary MD had long discussion with pts  and CAMRON Miranda about discharge planning. Discussed that unfortunately TCU is not an option at this point and we need to move forward with LTC.   - 11/9 - discussed at weekly SWAT meeting due to difficult discharge.  - Recommended looking into options for increasing care at assisted living vs LTC. This was discussed with pt, daughter and  by SW on 11/9.  - RNCC/SW team working on safe discharge plan, greatly appreciate assistance. Looking at  community TCU options and LTC, has been refused by  TCU multiple times.  - PM&R consulted this admission to assist with evaluation for appropriate dispo and assess rehab potential, last evaluated on 11/13     Partial Seizure, previously controlled -no further seizure  Chronic aphasia   Difficulty Swallowing  -Presented with acute (< 1 day) worsening of aphasia and new facial twitching with abnormal mouth movements concerning for partial seizure.  She underwent stroke eval in the ED which was reassuring against acute CVA  -Given recent recurrence of glioblastoma, there was concern for spread or advance of disease, though both CT and MRI were reassuring to stability of current malignancy from last imaging (9/2023).   -Neurology consulted and felt movements were consistent with partial seizure, lacosamide was added, though subsequent EEG showed some degree of persistent seizure activity, and further brief clinical seizures were observed.   Plan:  >Neurologist discussed risk/benefit of a possible third antiepileptic medication (which could carry a risk of heavier sedation), the family elected to remain on the two current medications for quality of life reasons.  - Neurology consulted this admission, not currently seeing daily  - Continue levetiracetam 1250 mg BID   - Lacosamide 100 MG BID - transitioned to oral on 10/16  - Could increase to 150mg BID if increased seizure burden occurs, but has been stable on current dosing   - May reconsider third agent should seizure burden change drastically (ie Grand Mal, etc)   - Lorazepam PRN for any seizure activity > 2 minutes or increased clusters of seizure like activity      H/o L frontoparietal glioblastoma s/p resection, chemotherapy and radiation (completed May 2021), with recurrence of malignancy 2023   Cognitive and functional impairment due to glioblastoma and chemoradiation   Seen by oncology on this visit, repeat imaging stable from September. Started on trial of  steroid, eval symptom improvement.   - Oncology consulted, appreciate input - signed off, s/p bevacizumab 10/18, 11/1, 11/15 - bevacizumab now on hold until outpatient follow up per discussion with Dr. Baker (outpatient oncologist) on 11/16  - Rad/Onc consulted, RT started on 10/12 for 10 fractions - finished on 10/25  - Dexamethasone 4mg IV BID  (10/9 - 10/25) - headache resolved and no new neuro symptoms. Start tapering as of 10/26 - 2 mg BID x 2 weeks (10/26-11/9), 2 mg daily x 2 weeks (11/10-11/23), then stop (taper ordered)  - TMP-SMX daily for PCP PPX (pt had hx of PJP)   - PT/OT consulted, appreciate input  -Likely LTC on discharge, significant discussion with primary MD, PMR and PT/OT, Case management on 11/13. [facility] unable to accept patient and recommendation per specialties for LTC placement on discharge     Likely aspiration pneumonitis: resolved  - brianna albuterol MDI q6H while awake   - DuoNeb QID PRN   - SLP consult completed, appreciate input. Soft diet level 6 with thin liquids.     Depression - Continue PTA Buspirone and Fluoxetine      H/o DVT - Continue PTA rivaroxaban      HTN - Continue PTA Amlodipine      Hemorrhoids   -topical preparation H  -monitor           Diet: Snacks/Supplements Adult: Other; Send Berry Magic Cup with dinner tray and allow pt/RN to order prn also (Also likes orange if in stock. NO van or ora); With Meals  Regular Diet Adult Thin Liquids (level 0)    DVT Prophylaxis: DOAC  Martino Catheter: Not present  Lines: None     Cardiac Monitoring: None  Code Status: Full Code      Clinically Significant Risk Factors                  # Hypertension: Noted on problem list                   Disposition Plan     Expected Discharge Date: 11/16/2023    Discharge Delays: Placement - TCU  Destination: assisted living;nursing home  Discharge Comments: LTC - next chemo dose is today  Bevacizumab [id number] (last dose 11/1), done w/ radiation on 10/25,          João Kent MD  Hospitalist  Service, GOLD TEAM 10  [facility]  Securely message with [employer] (more info)  Text page via [employer] Paging/Directory   See signed in provider for up to date coverage information  ______________________________________________________________________    Interval History   No acute events overnight. Per  patient more interactive and smiling last night. No new issues noted. No fever or chills. NO chest pain. No shortness of breath. No abdominal pain. NO anxiety.NO pain noted.     Physical Exam   Vital Signs: Temp: 98.2  F (36.8  C) Temp src: Oral BP: (!) 141/71 Pulse: 65   Resp: 16 SpO2: 94 % O2 Device: None (Room air)    Weight: 151 lbs 14.35 oz    General Appearance: No acute distress, lying in bed  Respiratory: CTAB. No increased WOB.  Cardiovascular: RRR. No m/  GI: Soft. Non-tender. Non-distended.  : purewick draining yellow urine  Skin: No rash.  Neuro: right upper extremity weakness 2/5 strength in upper and lower right extremities .  strength present in left hand and able to plantar and dorsiflex left foot  Other: AOx4. Flat affect.    Medical Decision Making       40 MINUTES SPENT BY ME on the date of service doing chart review, history, exam, documentation & further activities per the note.      Data     I have personally reviewed the following data over the past 24 hrs:    4.9  \   10.9 (L)   / 139 (L)     139 106 26.3 (H) /  94   3.9 24 0.62 \     ALT: 17 AST: 15 AP: 60 TBILI: 0.2   ALB: 3.5 TOT PROTEIN: 5.5 (L) LIPASE: N/A       Imaging results reviewed over the past 24 hrs:   No results found for this or any previous visit (from the past 24 hour(s)).

## 2023-11-16 NOTE — PLAN OF CARE
"BP (!) 141/71 (BP Location: Left arm, Cuff Size: Adult Regular)   Pulse 65   Temp 98.2  F (36.8  C) (Oral)   Resp 16   Ht 1.702 m (5' 7\")   Wt 68.9 kg (151 lb 14.4 oz)   SpO2 94%   BMI 23.79 kg/m      Afebrile, VSS on room air. Patient slept comfortably between cares. 2 large, soft stools overnight. Purewick in place- voiding adequately. Turned Q2 hours. Continue with plan of care, awaiting placement.     Problem: Adult Inpatient Plan of Care  Goal: Optimal Comfort and Wellbeing  Outcome: Adequate for Care Transition     Problem: Adult Inpatient Plan of Care  Goal: Readiness for Transition of Care  Outcome: Adequate for Care Transition         "

## 2023-11-17 ENCOUNTER — APPOINTMENT (OUTPATIENT)
Dept: PHYSICAL THERAPY | Facility: CLINIC | Age: 78
DRG: 054 | End: 2023-11-17
Attending: STUDENT IN AN ORGANIZED HEALTH CARE EDUCATION/TRAINING PROGRAM
Payer: MEDICARE

## 2023-11-17 ENCOUNTER — APPOINTMENT (OUTPATIENT)
Dept: OCCUPATIONAL THERAPY | Facility: CLINIC | Age: 78
DRG: 054 | End: 2023-11-17
Attending: STUDENT IN AN ORGANIZED HEALTH CARE EDUCATION/TRAINING PROGRAM
Payer: MEDICARE

## 2023-11-17 ENCOUNTER — APPOINTMENT (OUTPATIENT)
Dept: NEUROLOGY | Facility: CLINIC | Age: 78
DRG: 054 | End: 2023-11-17
Attending: STUDENT IN AN ORGANIZED HEALTH CARE EDUCATION/TRAINING PROGRAM
Payer: MEDICARE

## 2023-11-17 ENCOUNTER — APPOINTMENT (OUTPATIENT)
Dept: SPEECH THERAPY | Facility: CLINIC | Age: 78
DRG: 054 | End: 2023-11-17
Attending: STUDENT IN AN ORGANIZED HEALTH CARE EDUCATION/TRAINING PROGRAM
Payer: MEDICARE

## 2023-11-17 ENCOUNTER — APPOINTMENT (OUTPATIENT)
Dept: PHYSICAL THERAPY | Facility: CLINIC | Age: 78
DRG: 054 | End: 2023-11-17
Attending: INTERNAL MEDICINE
Payer: MEDICARE

## 2023-11-17 LAB
ANION GAP SERPL CALCULATED.3IONS-SCNC: 10 MMOL/L (ref 7–15)
BUN SERPL-MCNC: 37.8 MG/DL (ref 8–23)
CALCIUM SERPL-MCNC: 9.4 MG/DL (ref 8.8–10.2)
CHLORIDE SERPL-SCNC: 107 MMOL/L (ref 98–107)
CREAT SERPL-MCNC: 0.59 MG/DL (ref 0.51–0.95)
DEPRECATED HCO3 PLAS-SCNC: 23 MMOL/L (ref 22–29)
EGFRCR SERPLBLD CKD-EPI 2021: >90 ML/MIN/1.73M2
ERYTHROCYTE [DISTWIDTH] IN BLOOD BY AUTOMATED COUNT: 13.7 % (ref 10–15)
GLUCOSE SERPL-MCNC: 94 MG/DL (ref 70–99)
HCT VFR BLD AUTO: 32.1 % (ref 35–47)
HGB BLD-MCNC: 10.3 G/DL (ref 11.7–15.7)
LEVETIRACETAM SERPL-MCNC: 32.5 ΜG/ML (ref 10–40)
MCH RBC QN AUTO: 29.5 PG (ref 26.5–33)
MCHC RBC AUTO-ENTMCNC: 32.1 G/DL (ref 31.5–36.5)
MCV RBC AUTO: 92 FL (ref 78–100)
PLATELET # BLD AUTO: 146 10E3/UL (ref 150–450)
POTASSIUM SERPL-SCNC: 4.1 MMOL/L (ref 3.4–5.3)
RBC # BLD AUTO: 3.49 10E6/UL (ref 3.8–5.2)
SODIUM SERPL-SCNC: 140 MMOL/L (ref 135–145)
WBC # BLD AUTO: 5.8 10E3/UL (ref 4–11)

## 2023-11-17 PROCEDURE — 80235 DRUG ASSAY LACOSAMIDE: CPT | Performed by: STUDENT IN AN ORGANIZED HEALTH CARE EDUCATION/TRAINING PROGRAM

## 2023-11-17 PROCEDURE — 80048 BASIC METABOLIC PNL TOTAL CA: CPT | Performed by: STUDENT IN AN ORGANIZED HEALTH CARE EDUCATION/TRAINING PROGRAM

## 2023-11-17 PROCEDURE — 97530 THERAPEUTIC ACTIVITIES: CPT | Mod: GP

## 2023-11-17 PROCEDURE — 120N000005 HC R&B MS OVERFLOW UMMC

## 2023-11-17 PROCEDURE — 99207 EEG VIDEO 2-12 HRS UNMONITORED: CPT | Performed by: PSYCHIATRY & NEUROLOGY

## 2023-11-17 PROCEDURE — 999N000248 HC STATISTIC IV INSERT WITH US BY RN

## 2023-11-17 PROCEDURE — 97530 THERAPEUTIC ACTIVITIES: CPT | Mod: GO

## 2023-11-17 PROCEDURE — 99232 SBSQ HOSP IP/OBS MODERATE 35: CPT | Performed by: STUDENT IN AN ORGANIZED HEALTH CARE EDUCATION/TRAINING PROGRAM

## 2023-11-17 PROCEDURE — 80177 DRUG SCRN QUAN LEVETIRACETAM: CPT | Performed by: STUDENT IN AN ORGANIZED HEALTH CARE EDUCATION/TRAINING PROGRAM

## 2023-11-17 PROCEDURE — 95718 EEG PHYS/QHP 2-12 HR W/VEEG: CPT | Performed by: PSYCHIATRY & NEUROLOGY

## 2023-11-17 PROCEDURE — 36415 COLL VENOUS BLD VENIPUNCTURE: CPT | Performed by: STUDENT IN AN ORGANIZED HEALTH CARE EDUCATION/TRAINING PROGRAM

## 2023-11-17 PROCEDURE — 85027 COMPLETE CBC AUTOMATED: CPT | Performed by: STUDENT IN AN ORGANIZED HEALTH CARE EDUCATION/TRAINING PROGRAM

## 2023-11-17 PROCEDURE — 250N000013 HC RX MED GY IP 250 OP 250 PS 637: Performed by: STUDENT IN AN ORGANIZED HEALTH CARE EDUCATION/TRAINING PROGRAM

## 2023-11-17 PROCEDURE — 250N000012 HC RX MED GY IP 250 OP 636 PS 637: Performed by: STUDENT IN AN ORGANIZED HEALTH CARE EDUCATION/TRAINING PROGRAM

## 2023-11-17 PROCEDURE — 95711 VEEG 2-12 HR UNMONITORED: CPT

## 2023-11-17 PROCEDURE — 97112 NEUROMUSCULAR REEDUCATION: CPT | Mod: GP

## 2023-11-17 PROCEDURE — 92523 SPEECH SOUND LANG COMPREHEN: CPT | Mod: GN

## 2023-11-17 PROCEDURE — 99222 1ST HOSP IP/OBS MODERATE 55: CPT | Mod: 25 | Performed by: PSYCHIATRY & NEUROLOGY

## 2023-11-17 PROCEDURE — 999N000150 HC STATISTIC PT MED CONFERENCE < 30 MIN

## 2023-11-17 PROCEDURE — 92507 TX SP LANG VOICE COMM INDIV: CPT | Mod: GN

## 2023-11-17 PROCEDURE — 250N000013 HC RX MED GY IP 250 OP 250 PS 637: Performed by: INTERNAL MEDICINE

## 2023-11-17 RX ADMIN — SULFAMETHOXAZOLE AND TRIMETHOPRIM 1 TABLET: 800; 160 TABLET ORAL at 07:49

## 2023-11-17 RX ADMIN — THERA TABS 1 TABLET: TAB at 07:48

## 2023-11-17 RX ADMIN — DEXAMETHASONE 2 MG: 2 TABLET ORAL at 07:49

## 2023-11-17 RX ADMIN — PANTOPRAZOLE SODIUM 40 MG: 40 TABLET, DELAYED RELEASE ORAL at 16:06

## 2023-11-17 RX ADMIN — PANTOPRAZOLE SODIUM 40 MG: 40 TABLET, DELAYED RELEASE ORAL at 07:49

## 2023-11-17 RX ADMIN — RIVAROXABAN 20 MG: 10 TABLET, FILM COATED ORAL at 18:18

## 2023-11-17 RX ADMIN — LEVETIRACETAM 1250 MG: 750 TABLET, FILM COATED ORAL at 18:19

## 2023-11-17 RX ADMIN — ALBUTEROL SULFATE 2 PUFF: 90 AEROSOL, METERED RESPIRATORY (INHALATION) at 20:11

## 2023-11-17 RX ADMIN — LACOSAMIDE 100 MG: 50 TABLET, FILM COATED ORAL at 07:49

## 2023-11-17 RX ADMIN — ALBUTEROL SULFATE 2 PUFF: 90 AEROSOL, METERED RESPIRATORY (INHALATION) at 07:50

## 2023-11-17 RX ADMIN — BUSPIRONE HYDROCHLORIDE 30 MG: 30 TABLET ORAL at 07:49

## 2023-11-17 RX ADMIN — FLUOXETINE 40 MG: 20 CAPSULE ORAL at 07:48

## 2023-11-17 RX ADMIN — LEVETIRACETAM 1250 MG: 750 TABLET, FILM COATED ORAL at 07:48

## 2023-11-17 RX ADMIN — BUSPIRONE HYDROCHLORIDE 30 MG: 30 TABLET ORAL at 18:19

## 2023-11-17 RX ADMIN — AMLODIPINE BESYLATE 5 MG: 5 TABLET ORAL at 07:48

## 2023-11-17 RX ADMIN — LACOSAMIDE 100 MG: 50 TABLET, FILM COATED ORAL at 18:18

## 2023-11-17 ASSESSMENT — ACTIVITIES OF DAILY LIVING (ADL)
ADLS_ACUITY_SCORE: 65
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
## 2023-11-17 NOTE — PROGRESS NOTES
11/17/23 6423   Appointment Info   Signing Clinician's Name / Credentials (SLP) Nafisa Patricia MS CCC-SLP   General Information   Onset of Illness/Injury or Date of Surgery 10/06/23   Referring Physician João Kent MD   Patient/Family Therapy Goal Statement (SLP) None stated   Pertinent History of Current Problem Olga Bailey is a 78 year old female admitted on 10/6/2023. She has history of L frontoparietal glioblastoma s/p resection, chemotherapy and radiation with remission in 2021, recently found to have recurrence in September 2023, with baseline cognitive and functional impairment, h/o seizures, h/o DVT on xarelto, HTN, and depression who presented 10/6/23 with acute worsening of baseline aphasia as well as new facial twitching. Admitted to internal medicine for further work up and management. Neurology consulted and patient found to be having partial seizures for which lacosamide was added to keppra with improvement in seizure activity. Continued radiotherapy while on [facility] but has now transferred to [place] for concomitant bevacizumab. Tolerated bevacizumab well however developed worsening headaches and tiredness at reduced dose of steroids so increased back up to 4 mg BID per rad onc. Completed 10 days of radiation on 10/25/23. Has received ongoing bevacizumab per treatment cycle.  Medically ready to discharge to hopefully TCU. WAB language eval completed per MD orders.   Type of Evaluation   Type of Evaluation Speech, Language, Cognition   Western Aphasia Battery- Revised Bedside Record From   Spontaneous Speech Content Score (out of 10) 1   Spontaneous Speech Fluency Score (out of 10) 2   Auditory Verbal Comprehension Score (out of 10) 7   Sequential Commands Score (out of 10) 1   Repetition Score (out of 10) 5   Object Naming Score (out of 10) 5   Bedside Aphasia Sum 21   WAB-R Bedside Aphasia Score 35   Bedside Aphasia Classification Broca's Aphasia   Aphasia Severity Level  Severe Aphasia   General Therapy Interventions   Planned Therapy Interventions Language   Language Reading comprehension;Auditory comprehension;Verbal expression;Written expression   Clinical Impression   Criteria for Skilled Therapeutic Interventions Met (SLP Eval) Yes, treatment indicated   SLP Diagnosis severe Broca's aphasia   Risks & Benefits of therapy have been explained evaluation/treatment results reviewed;care plan/treatment goals reviewed;risks/benefits reviewed;current/potential barriers reviewed;participants voiced agreement with care plan;participants included;patient;spouse/significant other   Clinical Impression Comments Language evaluation completed per MD order. Administered the Bedside Western Aphasia Battery - Revised (WAB-R); pt scored 35/100, indicating severe Broca's aphasia. Expressive language characterized by halting speech with often single words. Deficits are present in structured tasks and conversation. Relative strength is basic yes/no questions. Recommend ongoing ST targeting basic verbal expression and auditory comprehension. Pt benefits from total communication approach and increased response time. ST to continue to follow. Anticipate pt would benefit from OP SLP follow-up at discharge targeting speech/language deficits.   SLP Discharge Recommendation home with outpatient therapy services   SLP Rationale for DC Rec pt below baseline speech/language skills   SLP Brief overview of current status  Recommend ongoing ST targeting basic verbal expression and auditory comprehension. Pt benefits from total communication approach and increased response time.   Total Session Time   Total Session Time (sum of timed and untimed services) 35

## 2023-11-17 NOTE — PROGRESS NOTES
[facility]    Medicine Progress Note - Hospitalist Service, GOLD TEAM 10    Date of Admission:  10/6/2023    Assessment & Plan   Olga Bailey is a 78 year old female admitted on 10/6/2023. She has history of L frontoparietal glioblastoma s/p resection, chemotherapy and radiation with remission in 2021, recently found to have recurrence in September 2023, with baseline cognitive and functional impairment, h/o seizures, h/o DVT on xarelto, HTN, and depression who presented 10/6/23 with acute worsening of baseline aphasia as well as new facial twitching. Admitted to internal medicine for further work up and management. Neurology consulted and patient found to be having partial seizures for which lacosamide was added to keppra with improvement in seizure activity. Continued radiotherapy while on [facility] but has now transferred to [place] for concomitant bevacizumab. Tolerated bevacizumab well however developed worsening headaches and tiredness at reduced dose of steroids so increased back up to 4 mg BID per rad onc. Completed 10 days of radiation on 10/25/23. Has received ongoing bevacizumab per treatment cycle.  Medically ready to discharge to hopefully TCU.    Interval Changes:  -Neurology consult given disposition delays due to concerns over seizures to re-evaluate degree of seizure control to allow disposition from the hospital  -Speech consult for ongoing speech therapy    Discharge planning  Medically stable for discharge pending safe discharge plan  - 11/13 - primary MD had long discussion with pts  and CAMRON Miranda about discharge planning. Discussed that unfortunately TCU is not an option at this point and we need to move forward with LTC.   - 11/9 - discussed at weekly SWAT meeting due to difficult discharge.  - Recommended looking into options for increasing care at assisted living vs LTC. This was discussed with pt, daughter and  by SW on  11/9.  - RNCC/SW team working on safe discharge plan, greatly appreciate assistance. Looking at community TCU options and LTC, has been refused by  TCU multiple times.  - PM&R consulted this admission to assist with evaluation for appropriate dispo and assess rehab potential, last evaluated on 11/13     Partial Seizure, controlled -no further seizure  Chronic aphasia   Difficulty Swallowing  -Presented with acute (< 1 day) worsening of aphasia and new facial twitching with abnormal mouth movements concerning for partial seizure.  She underwent stroke eval in the ED which was reassuring against acute CVA  -Given recent recurrence of glioblastoma, there was concern for spread or advance of disease, though both CT and MRI were reassuring to stability of current malignancy from last imaging (9/2023).   -Neurology consulted and felt movements were consistent with partial seizure, lacosamide was added, though subsequent EEG showed some degree of persistent seizure activity, and further brief clinical seizures were observed.   Plan:  >Repeat neurology evaluation to comment on seizure control and anti epileptic regiment - 11/17  >Neurologist discussed risk/benefit of a possible third antiepileptic medication (which could carry a risk of heavier sedation), the family elected to remain on the two current medications for quality of life reasons.  - Continue levetiracetam 1250 mg BID   - Lacosamide 100 MG BID - transitioned to oral on 10/16  - Could increase to 150mg BID if increased seizure burden occurs, but has been stable on current dosing   - May reconsider third agent should seizure burden change drastically (ie Grand Mal, etc)   - Lorazepam PRN for any seizure activity > 2 minutes or increased clusters of seizure like activity      H/o L frontoparietal glioblastoma s/p resection, chemotherapy and radiation (completed May 2021), with recurrence of malignancy 2023   Cognitive and functional impairment due to glioblastoma  and chemoradiation   Brocas aphasia  Dementia  Seen by oncology on this visit, repeat imaging stable from September. Started on trial of steroid, eval symptom improvement.   -Previous neuro psych testing (4/2023) - demonstrate dementia  -SLP evaluation - 11/17 - severe brocas aphasia  - Oncology consulted, appreciate input - signed off, s/p bevacizumab 10/18, 11/1, 11/15 - bevacizumab now on hold until outpatient follow up per discussion with Dr. Baker (outpatient oncologist) on 11/16  - Rad/Onc consulted, RT started on 10/12 for 10 fractions - finished on 10/25  - Dexamethasone 4mg IV BID  (10/9 - 10/25) - headache resolved and no new neuro symptoms. Start tapering as of 10/26 - 2 mg BID x 2 weeks (10/26-11/9), 2 mg daily x 2 weeks (11/10-11/23), then stop (taper ordered)  - TMP-SMX daily for PCP PPX (pt had hx of PJP)   - PT/OT consulted, appreciate input  -Likely LTC on discharge, significant discussion with primary MD, PMR and PT/OT, Case management on 11/13. [facility] unable to accept patient and recommendation per specialties for LTC placement on discharge     Likely aspiration pneumonitis: resolved  - brianna albuterol MDI q6H while awake   - DuoNeb QID PRN   - SLP consult completed, appreciate input. Soft diet level 6 with thin liquids.     Depression - Continue PTA Buspirone and Fluoxetine      H/o DVT - Continue PTA rivaroxaban      HTN - Continue PTA Amlodipine      Hemorrhoids   -topical preparation H  -monitor           Diet: Snacks/Supplements Adult: Other; Send Berry Magic Cup with dinner tray and allow pt/RN to order prn also (Also likes orange if in stock. NO van or ora); With Meals  Regular Diet Adult Thin Liquids (level 0)  Snacks/Supplements Adult: Other; please send Gelatein plus pineapple + whip topping @ 2:00 and cherry geltaien + whip topping @ 10:00; Between Meals    DVT Prophylaxis: DOAC  Martino Catheter: Not present  Lines: None     Cardiac Monitoring: None  Code Status: Full Code      Clinically  Significant Risk Factors                  # Hypertension: Noted on problem list                   Disposition Plan             João Kent MD  Hospitalist Service, GOLD TEAM 10  [facility]  Securely message with [employer] (more info)  Text page via [employer] Paging/Directory   See signed in provider for up to date coverage information  ______________________________________________________________________    Interval History   No acute events overnight. She denies any pain. No chest pain. No shortness of breath. No anxiety. No nausea or vomiting. No abdominal pain. She is happy to see staff from her assisted living at bedside today.     Physical Exam   Vital Signs: Temp: 98.4  F (36.9  C) Temp src: Axillary BP: 136/81 Pulse: 71   Resp: 18 SpO2: 94 % O2 Device: None (Room air)    Weight: 151 lbs 14.35 oz    General Appearance: No acute distress, lying in bed  Respiratory: CTAB. No increased WOB.  Cardiovascular: RRR. No m/  GI: Soft. Non-tender. Non-distended.  : purewick draining yellow urine  Skin: No rash.  Neuro: right upper extremity weakness 2/5 strength in upper and lower right extremities .  strength present in left hand and able to plantar and dorsiflex left foot  Slowed responses to questions but answers appropriately     Medical Decision Making       40 MINUTES SPENT BY ME on the date of service doing chart review, history, exam, documentation & further activities per the note.      Data         Imaging results reviewed over the past 24 hrs:   No results found for this or any previous visit (from the past 24 hour(s)).

## 2023-11-17 NOTE — PLAN OF CARE
"/81 (BP Location: Left arm, Cuff Size: Adult Regular)   Pulse 71   Temp 98.4  F (36.9  C) (Axillary)   Resp 18   Ht 1.702 m (5' 7\")   Wt 68.9 kg (151 lb 14.4 oz)   SpO2 94%   BMI 23.79 kg/m      Pt slept comfortably during the night between cares. VSS on room air, afebrile. Denies pain and nausea. Follows directions appropriately, able to help with turns with left side, right side is weak. A&Ox3, answers questions with one or two word answers, sometimes unable to find the words. Turned and repositioned q2h overnight. Alternated foot braces overnight, 2 hrs on and 2 hrs off. Pt was amenable to this, when writer asked how her feet felt, she replied \"hot\". Took braces off at 0600. Skin underneath is CDI. Refused 0200 inhaler. Purewick in place, 1 L formed bm overnight, pericleanser and criticare ointment used on bottom. Buttocks and back blanchable red. Waiting on LTC placement. Continue with current POC.    Goal Outcome Evaluation:    Problem: Plan of Care - These are the overarching goals to be used throughout the patient stay.    Goal: Absence of Hospital-Acquired Illness or Injury  Intervention: Identify and Manage Fall Risk  Recent Flowsheet Documentation  Taken 11/17/2023 0400 by Ruth Doyle RN  Safety Promotion/Fall Prevention: safety round/check completed  Taken 11/17/2023 0200 by Ruth Doyle RN  Safety Promotion/Fall Prevention: safety round/check completed  Taken 11/17/2023 0000 by Ruth Doyle RN  Safety Promotion/Fall Prevention: safety round/check completed  Taken 11/16/2023 2200 by Ruth Doyle RN  Safety Promotion/Fall Prevention: safety round/check completed  Taken 11/16/2023 2000 by Ruth Doyle RN  Safety Promotion/Fall Prevention: safety round/check completed  Intervention: Prevent Skin Injury  Recent Flowsheet Documentation  Taken 11/17/2023 0400 by Ruth Doyle RN  Body Position:   turned   left  Taken 11/17/2023 0200 by Ruth Doyle RN  Body Position:   turned   " right  Taken 11/17/2023 0000 by Ruth Doyle RN  Body Position:   turned   left  Taken 11/16/2023 2200 by Ruth Doyle RN  Body Position:   turned   right  Taken 11/16/2023 2000 by Ruth Doyel RN  Body Position:   turned   left  Skin Protection:   incontinence pads utilized   protective footwear used   tubing/devices free from skin contact  Intervention: Prevent and Manage VTE (Venous Thromboembolism) Risk  Recent Flowsheet Documentation  Taken 11/16/2023 2000 by Ruth Doyle RN  VTE Prevention/Management: compression stockings off  Intervention: Prevent Infection  Recent Flowsheet Documentation  Taken 11/16/2023 2000 by Ruth Doyle RN  Infection Prevention:   environmental surveillance performed   hand hygiene promoted   personal protective equipment utilized   rest/sleep promoted   single patient room provided   visitors restricted/screened  Goal: Absence of Hospital-Acquired Illness or Injury  Intervention: Identify and Manage Fall Risk  Recent Flowsheet Documentation  Taken 11/17/2023 0400 by Ruth Doyle RN  Safety Promotion/Fall Prevention: safety round/check completed  Taken 11/17/2023 0200 by Ruth Doyle RN  Safety Promotion/Fall Prevention: safety round/check completed  Taken 11/17/2023 0000 by Ruth Doyle RN  Safety Promotion/Fall Prevention: safety round/check completed  Taken 11/16/2023 2200 by Ruth Doyle RN  Safety Promotion/Fall Prevention: safety round/check completed  Taken 11/16/2023 2000 by Ruth Doyle RN  Safety Promotion/Fall Prevention: safety round/check completed  Intervention: Prevent Skin Injury  Recent Flowsheet Documentation  Taken 11/17/2023 0400 by Ruth Doyle RN  Body Position:   turned   left  Taken 11/17/2023 0200 by Ruth Doyle RN  Body Position:   turned   right  Taken 11/17/2023 0000 by Ruth Doyle RN  Body Position:   turned   left  Taken 11/16/2023 2200 by Ruth Doyle RN  Body Position:   turned   right  Taken 11/16/2023 2000  by Tepfer, Ruth, RN  Body Position:   turned   left  Skin Protection:   incontinence pads utilized   protective footwear used   tubing/devices free from skin contact  Intervention: Prevent and Manage VTE (Venous Thromboembolism) Risk  Recent Flowsheet Documentation  Taken 11/16/2023 2000 by Ruth Doyle RN  VTE Prevention/Management: compression stockings off  Intervention: Prevent Infection  Recent Flowsheet Documentation  Taken 11/16/2023 2000 by Ruth Doyle RN  Infection Prevention:   environmental surveillance performed   hand hygiene promoted   personal protective equipment utilized   rest/sleep promoted   single patient room provided   visitors restricted/screened     Problem: Adult Inpatient Plan of Care  Goal: Absence of Hospital-Acquired Illness or Injury  Outcome: Progressing  Intervention: Identify and Manage Fall Risk  Recent Flowsheet Documentation  Taken 11/17/2023 0400 by Ruth Doyle RN  Safety Promotion/Fall Prevention: safety round/check completed  Taken 11/17/2023 0200 by Ruth Doyle RN  Safety Promotion/Fall Prevention: safety round/check completed  Taken 11/17/2023 0000 by Ruth Doyle RN  Safety Promotion/Fall Prevention: safety round/check completed  Taken 11/16/2023 2200 by Ruth Doyle RN  Safety Promotion/Fall Prevention: safety round/check completed  Taken 11/16/2023 2000 by Ruth Doyle RN  Safety Promotion/Fall Prevention: safety round/check completed  Intervention: Prevent Skin Injury  Recent Flowsheet Documentation  Taken 11/17/2023 0400 by Ruth Doyle RN  Body Position:   turned   left  Taken 11/17/2023 0200 by Ruth Doyle RN  Body Position:   turned   right  Taken 11/17/2023 0000 by Ruth Doyle RN  Body Position:   turned   left  Taken 11/16/2023 2200 by Ruth Doyle RN  Body Position:   turned   right  Taken 11/16/2023 2000 by Ruth Doyle RN  Body Position:   turned   left  Skin Protection:   incontinence pads utilized   protective  footwear used   tubing/devices free from skin contact  Intervention: Prevent and Manage VTE (Venous Thromboembolism) Risk  Recent Flowsheet Documentation  Taken 11/16/2023 2000 by Ruth Doyle RN  VTE Prevention/Management: compression stockings off  Intervention: Prevent Infection  Recent Flowsheet Documentation  Taken 11/16/2023 2000 by Ruth Doyle RN  Infection Prevention:   environmental surveillance performed   hand hygiene promoted   personal protective equipment utilized   rest/sleep promoted   single patient room provided   visitors restricted/screened     Problem: Thought Process Alteration  Goal: Optimal Thought Clarity  Intervention: Minimize Safety Risk and Altered Thought  Recent Flowsheet Documentation  Taken 11/16/2023 2000 by Ruth Doyle RN  Sensory Stimulation Regulation:   care clustered   quiet environment promoted     Problem: Communication Impairment  Goal: Effective Communication Skills  Intervention: Optimize Communication Skills  Recent Flowsheet Documentation  Taken 11/16/2023 2000 by Ruth Doyle RN  Communication Enhancement Strategies:   call light answered in person   extra time allowed for response

## 2023-11-17 NOTE — PLAN OF CARE
"/86 (BP Location: Right arm)   Pulse 80   Temp 98.4  F (36.9  C) (Oral)   Resp 16   Ht 1.702 m (5' 7\")   Wt 68.9 kg (151 lb 14.4 oz)   SpO2 93%   BMI 23.79 kg/m      Pt is alert, responsive and answers questions appropriately. She continues to have aphasia and needs extra time to respond. VSS and no complaints of pain or N/V/D. She is eating and drinking adequately with assistance. She continues to be incontinent of urine and stool and has the pur wick in place. She was turned and repositioned Q2H per orders. She worked with OT,PT, and Speech today.She had a bath in the afternoon. Plan for EEG to start tonight. Will continue to follow the plan of care.      Goal Outcome Evaluation:      Plan of Care Reviewed With: patient, spouse    Overall Patient Progress: no change    Problem: Plan of Care - These are the overarching goals to be used throughout the patient stay.    Goal: Plan of Care Review  Description: The Plan of Care Review/Shift note should be completed every shift.  The Outcome Evaluation is a brief statement about your assessment that the patient is improving, declining, or no change.  This information will be displayed automatically on your shift note.  The Plan of Care Review/Shift note should be completed every shift.  The Outcome Evaluation is a brief statement about your assessment that the patient is improving, declining, or no change.  This information will be displayed automatically on your shift  note.  Recent Flowsheet Documentation  Taken 11/17/2023 1739 by Maribell Delcid, RN  Plan of Care Reviewed With:   patient   spouse  Overall Patient Progress: no change  Goal: Absence of Hospital-Acquired Illness or Injury  Intervention: Identify and Manage Fall Risk  Recent Flowsheet Documentation  Taken 11/17/2023 1500 by Maribell Delcid, RN  Safety Promotion/Fall Prevention:   assistive device/personal items within reach   clutter free environment maintained   lighting adjusted   nonskid " shoes/slippers when out of bed   patient and family education   safety round/check completed  Taken 11/17/2023 0755 by Maribell Delcid RN  Safety Promotion/Fall Prevention:   assistive device/personal items within reach   clutter free environment maintained   lighting adjusted   nonskid shoes/slippers when out of bed   patient and family education   safety round/check completed  Intervention: Prevent Skin Injury  Recent Flowsheet Documentation  Taken 11/17/2023 1700 by Maribell Delcid RN  Body Position:   turned   heels elevated   foot of bed elevated  Taken 11/17/2023 1500 by Maribell Delcid RN  Body Position:   heels elevated   lower extremity elevated  Taken 11/17/2023 1100 by Maribell Delcid RN  Body Position: sitting up in bed  Taken 11/17/2023 1002 by Maribell Delcid RN  Body Position:   turned   right  Taken 11/17/2023 0813 by Maribell Delcid RN  Body Position:   turned   left  Taken 11/17/2023 0755 by Maribell Delcid RN  Skin Protection:   incontinence pads utilized   protective footwear used   tubing/devices free from skin contact  Device Skin Pressure Protection: absorbent pad utilized/changed  Intervention: Prevent and Manage VTE (Venous Thromboembolism) Risk  Recent Flowsheet Documentation  Taken 11/17/2023 0755 by Maribell Delcid RN  VTE Prevention/Management: compression stockings off  Intervention: Prevent Infection  Recent Flowsheet Documentation  Taken 11/17/2023 1200 by Maribell Delcid RN  Infection Prevention:   environmental surveillance performed   equipment surfaces disinfected   hand hygiene promoted   rest/sleep promoted   single patient room provided   visitors restricted/screened  Taken 11/17/2023 0755 by Maribell Delcid RN  Infection Prevention:   environmental surveillance performed   equipment surfaces disinfected   hand hygiene promoted   rest/sleep promoted   single patient room provided   visitors restricted/screened  Goal: Absence of Hospital-Acquired Illness or  Injury  Intervention: Identify and Manage Fall Risk  Recent Flowsheet Documentation  Taken 11/17/2023 1500 by Maribell Delcid RN  Safety Promotion/Fall Prevention:   assistive device/personal items within reach   clutter free environment maintained   lighting adjusted   nonskid shoes/slippers when out of bed   patient and family education   safety round/check completed  Taken 11/17/2023 0755 by Maribell Delcid RN  Safety Promotion/Fall Prevention:   assistive device/personal items within reach   clutter free environment maintained   lighting adjusted   nonskid shoes/slippers when out of bed   patient and family education   safety round/check completed  Intervention: Prevent Skin Injury  Recent Flowsheet Documentation  Taken 11/17/2023 1700 by Maribell Delcid RN  Body Position:   turned   heels elevated   foot of bed elevated  Taken 11/17/2023 1500 by Maribell Delcid RN  Body Position:   heels elevated   lower extremity elevated  Taken 11/17/2023 1100 by Maribell Delcid RN  Body Position: sitting up in bed  Taken 11/17/2023 1002 by Maribell Delcid RN  Body Position:   turned   right  Taken 11/17/2023 0813 by Maribell Delcid RN  Body Position:   turned   left  Taken 11/17/2023 0755 by Maribell Delcid RN  Skin Protection:   incontinence pads utilized   protective footwear used   tubing/devices free from skin contact  Device Skin Pressure Protection: absorbent pad utilized/changed  Intervention: Prevent and Manage VTE (Venous Thromboembolism) Risk  Recent Flowsheet Documentation  Taken 11/17/2023 0755 by Maribell Delcid RN  VTE Prevention/Management: compression stockings off  Intervention: Prevent Infection  Recent Flowsheet Documentation  Taken 11/17/2023 1200 by Maribell Delcid RN  Infection Prevention:   environmental surveillance performed   equipment surfaces disinfected   hand hygiene promoted   rest/sleep promoted   single patient room provided   visitors restricted/screened  Taken 11/17/2023 0755 by  "Maribell Delcid, RN  Infection Prevention:   environmental surveillance performed   equipment surfaces disinfected   hand hygiene promoted   rest/sleep promoted   single patient room provided   visitors restricted/screened     Problem: Adult Inpatient Plan of Care  Goal: Plan of Care Review  Description: The Plan of Care Review/Shift note should be completed every shift.  The Outcome Evaluation is a brief statement about your assessment that the patient is improving, declining, or no change.  This information will be displayed automatically on your shift  note.  Outcome: Progressing  Flowsheets (Taken 11/17/2023 1735)  Plan of Care Reviewed With:   patient   spouse  Overall Patient Progress: no change  Goal: Patient-Specific Goal (Individualized)  Description: You can add care plan individualizations to a care plan. Examples of Individualization might be:  \"Parent requests to be called daily at 9am for status\", \"I have a hard time hearing out of my right ear\", or \"Do not touch me to wake me up as it startles  me\".  Outcome: Progressing  Goal: Absence of Hospital-Acquired Illness or Injury  Outcome: Progressing  Intervention: Identify and Manage Fall Risk  Recent Flowsheet Documentation  Taken 11/17/2023 1500 by Maribell Delcid, RN  Safety Promotion/Fall Prevention:   assistive device/personal items within reach   clutter free environment maintained   lighting adjusted   nonskid shoes/slippers when out of bed   patient and family education   safety round/check completed  Taken 11/17/2023 0755 by Maribell Delcid, RN  Safety Promotion/Fall Prevention:   assistive device/personal items within reach   clutter free environment maintained   lighting adjusted   nonskid shoes/slippers when out of bed   patient and family education   safety round/check completed  Intervention: Prevent Skin Injury  Recent Flowsheet Documentation  Taken 11/17/2023 1700 by Maribell Delcid, RN  Body Position:   turned   heels elevated   foot of " bed elevated  Taken 11/17/2023 1500 by Maribell Delcid RN  Body Position:   heels elevated   lower extremity elevated  Taken 11/17/2023 1100 by Maribell Delcid RN  Body Position: sitting up in bed  Taken 11/17/2023 1002 by Maribell Delcid RN  Body Position:   turned   right  Taken 11/17/2023 0813 by Maribell Delcid RN  Body Position:   turned   left  Taken 11/17/2023 0755 by Maribell Delcid RN  Skin Protection:   incontinence pads utilized   protective footwear used   tubing/devices free from skin contact  Device Skin Pressure Protection: absorbent pad utilized/changed  Intervention: Prevent and Manage VTE (Venous Thromboembolism) Risk  Recent Flowsheet Documentation  Taken 11/17/2023 0755 by Maribell Delcid RN  VTE Prevention/Management: compression stockings off  Intervention: Prevent Infection  Recent Flowsheet Documentation  Taken 11/17/2023 1200 by Maribell Delcid RN  Infection Prevention:   environmental surveillance performed   equipment surfaces disinfected   hand hygiene promoted   rest/sleep promoted   single patient room provided   visitors restricted/screened  Taken 11/17/2023 0755 by Maribell Delcid RN  Infection Prevention:   environmental surveillance performed   equipment surfaces disinfected   hand hygiene promoted   rest/sleep promoted   single patient room provided   visitors restricted/screened     Problem: Seizure Disorder Comorbidity  Goal: Maintenance of Seizure Control  Intervention: Maintain Seizure-Symptom Control  Recent Flowsheet Documentation  Taken 11/17/2023 0755 by Maribell Delcid RN  Seizure Precautions: clutter-free environment maintained     Problem: Hypertension Comorbidity  Goal: Blood Pressure in Desired Range  Intervention: Maintain Blood Pressure Management  Recent Flowsheet Documentation  Taken 11/17/2023 0755 by Maribell Delcid RN  Medication Review/Management: medications reviewed     Problem: Thought Process Alteration  Goal: Optimal Thought  Clarity  Intervention: Minimize Safety Risk and Altered Thought  Recent Flowsheet Documentation  Taken 11/17/2023 0755 by Maribell Delcid, RN  Sensory Stimulation Regulation:   care clustered   quiet environment promoted     Problem: Swallowing Impairment  Goal: Optimal Eating/Swallowing without Aspiration  Intervention: Optimize Eating and Swallowing  Recent Flowsheet Documentation  Taken 11/17/2023 0755 by Maribell Delcid, RN  Aspiration Precautions: awake/alert before oral intake     Problem: Communication Impairment  Goal: Effective Communication Skills  Intervention: Optimize Communication Skills  Recent Flowsheet Documentation  Taken 11/17/2023 0755 by Maribell Delcid RN  Communication Enhancement Strategies:   call light answered in person   extra time allowed for response

## 2023-11-17 NOTE — PROGRESS NOTES
Care Management Follow Up    Length of Stay (days): 41    Expected Discharge Date: 11/17/2023     Concerns to be Addressed: discharge planning     Patient plan of care discussed at interdisciplinary rounds: Yes    Anticipated Discharge Disposition: Long Term Care  Disposition Comments: n/a  Anticipated Discharge Services:  (TBD)  Anticipated Discharge DME:  (TBD)    Patient/family educated on Medicare website which has current facility and service quality ratings: yes  Education Provided on the Discharge Plan: Yes  Patient/Family in Agreement with the Plan: yes    Referrals Placed by CM/SW:  (LongTerm Care)  Private pay costs discussed: private room/amenity fees    Additional Information:  CAMRON received a call from Alana, Director of nursing, she is planning on arriving at the hospital around 10 am. She will meet with Olga and then the PT.  CAMRON met with Alana after she with Olga.  Alana noted the change in Olga since her stroke seizure.  She completed the notes of what patient will need if she were to return to [facility].  Alana feels that patient would benefit most from a long-term care facility with a TCU attached so that patient can get full therapies.  [facility] is working with other [facility] [place] facilities to find a possible placement from Olga.  Management is still going to meet to see if they can manage Olga  if she were to return to the [facility] facility.     CAMRON to follow and assist with any other discharge needs that may arise.  DAYNA Peres   5A beds:5220-40  5C beds 1017-32 (no BMT pt's)     Phone: [phone]  Pager: [phone]

## 2023-11-18 ENCOUNTER — APPOINTMENT (OUTPATIENT)
Dept: NEUROLOGY | Facility: CLINIC | Age: 78
DRG: 054 | End: 2023-11-18
Attending: STUDENT IN AN ORGANIZED HEALTH CARE EDUCATION/TRAINING PROGRAM
Payer: MEDICARE

## 2023-11-18 PROCEDURE — 250N000013 HC RX MED GY IP 250 OP 250 PS 637: Performed by: STUDENT IN AN ORGANIZED HEALTH CARE EDUCATION/TRAINING PROGRAM

## 2023-11-18 PROCEDURE — 99232 SBSQ HOSP IP/OBS MODERATE 35: CPT | Performed by: STUDENT IN AN ORGANIZED HEALTH CARE EDUCATION/TRAINING PROGRAM

## 2023-11-18 PROCEDURE — 95711 VEEG 2-12 HR UNMONITORED: CPT

## 2023-11-18 PROCEDURE — 250N000013 HC RX MED GY IP 250 OP 250 PS 637: Performed by: INTERNAL MEDICINE

## 2023-11-18 PROCEDURE — 250N000012 HC RX MED GY IP 250 OP 636 PS 637: Performed by: STUDENT IN AN ORGANIZED HEALTH CARE EDUCATION/TRAINING PROGRAM

## 2023-11-18 PROCEDURE — 99231 SBSQ HOSP IP/OBS SF/LOW 25: CPT | Mod: 25 | Performed by: PSYCHIATRY & NEUROLOGY

## 2023-11-18 PROCEDURE — 3E0330M INTRODUCTION OF MONOCLONAL ANTIBODY INTO PERIPHERAL VEIN, PERCUTANEOUS APPROACH: ICD-10-PCS | Performed by: INTERNAL MEDICINE

## 2023-11-18 PROCEDURE — 95718 EEG PHYS/QHP 2-12 HR W/VEEG: CPT | Performed by: PSYCHIATRY & NEUROLOGY

## 2023-11-18 PROCEDURE — 120N000005 HC R&B MS OVERFLOW UMMC

## 2023-11-18 RX ADMIN — ALBUTEROL SULFATE 2 PUFF: 90 AEROSOL, METERED RESPIRATORY (INHALATION) at 08:23

## 2023-11-18 RX ADMIN — BUSPIRONE HYDROCHLORIDE 30 MG: 30 TABLET ORAL at 08:22

## 2023-11-18 RX ADMIN — PANTOPRAZOLE SODIUM 40 MG: 40 TABLET, DELAYED RELEASE ORAL at 08:23

## 2023-11-18 RX ADMIN — BUSPIRONE HYDROCHLORIDE 30 MG: 30 TABLET ORAL at 18:28

## 2023-11-18 RX ADMIN — SULFAMETHOXAZOLE AND TRIMETHOPRIM 1 TABLET: 800; 160 TABLET ORAL at 08:23

## 2023-11-18 RX ADMIN — LACOSAMIDE 100 MG: 50 TABLET, FILM COATED ORAL at 08:23

## 2023-11-18 RX ADMIN — LACOSAMIDE 100 MG: 50 TABLET, FILM COATED ORAL at 18:28

## 2023-11-18 RX ADMIN — LEVETIRACETAM 1250 MG: 750 TABLET, FILM COATED ORAL at 18:28

## 2023-11-18 RX ADMIN — DEXAMETHASONE 2 MG: 2 TABLET ORAL at 08:22

## 2023-11-18 RX ADMIN — THERA TABS 1 TABLET: TAB at 08:22

## 2023-11-18 RX ADMIN — PANTOPRAZOLE SODIUM 40 MG: 40 TABLET, DELAYED RELEASE ORAL at 16:56

## 2023-11-18 RX ADMIN — FLUOXETINE 40 MG: 20 CAPSULE ORAL at 08:22

## 2023-11-18 RX ADMIN — ALBUTEROL SULFATE 2 PUFF: 90 AEROSOL, METERED RESPIRATORY (INHALATION) at 14:06

## 2023-11-18 RX ADMIN — LEVETIRACETAM 1250 MG: 750 TABLET, FILM COATED ORAL at 08:22

## 2023-11-18 RX ADMIN — RIVAROXABAN 20 MG: 10 TABLET, FILM COATED ORAL at 16:56

## 2023-11-18 RX ADMIN — AMLODIPINE BESYLATE 5 MG: 5 TABLET ORAL at 08:22

## 2023-11-18 ASSESSMENT — ACTIVITIES OF DAILY LIVING (ADL)
ADLS_ACUITY_SCORE: 65

## 2023-11-18 NOTE — PROGRESS NOTES
[facility]    Medicine Progress Note - Hospitalist Service, GOLD TEAM 10    Date of Admission:  10/6/2023    Assessment & Plan   Olga Bailey is a 78 year old female admitted on 10/6/2023. She has history of L frontoparietal glioblastoma s/p resection, chemotherapy and radiation with remission in 2021, recently found to have recurrence in September 2023, with baseline cognitive and functional impairment, h/o seizures, h/o DVT on xarelto, HTN, and depression who presented 10/6/23 with acute worsening of baseline aphasia as well as new facial twitching. Admitted to internal medicine for further work up and management. Neurology consulted and patient found to be having partial seizures for which lacosamide was added to keppra with improvement in seizure activity. Continued radiotherapy [place] on [facility] [place] but has now transferred to [place] for concomitant bevacizumab. Tolerated bevacizumab well however developed worsening headaches and tiredness at reduced dose of steroids so increased back up to 4 mg BID per rad onc. Completed 10 days of radiation on 10/25/23. Has received ongoing bevacizumab per treatment cycle, last cycle finished on 11/15 and further treatment delayed until outpatient follow up with Dr. Baker neuro oncolgoist.  Medically ready to discharge to hopefully TCU.    Interval Changes:  -EEG reviewed by neurology and no seizure or epileptiform activity. Currently adequately treated with anti epileptic agents and no delay needed for further seizure control given lack of seizure activity or concerns based on primary and neurology team evaluation  -Ongoing work toward disposition    Discharge planning  Medically stable for discharge pending safe discharge plan  - 11/13 - primary MD had long discussion with pts  and SW Ruth about discharge planning. Discussed that unfortunately TCU is not an option at this point and we need to move forward with LTC.    - 11/9 - discussed at weekly SWAT meeting due to difficult discharge.  - Recommended looking into options for increasing care at assisted living vs LTC. This was discussed with pt, daughter and  by SW on 11/9.  - RNCC/SW team working on safe discharge plan, greatly appreciate assistance. Looking at community TCU options and LTC, has been refused by  TCU multiple times.  - PM&R consulted this admission to assist with evaluation for appropriate dispo and assess rehab potential, last evaluated on 11/13     Partial Seizure, controlled -no further seizure  Chronic aphasia   Difficulty Swallowing  -Presented with acute (< 1 day) worsening of aphasia and new facial twitching with abnormal mouth movements concerning for partial seizure.  She underwent stroke eval in the ED which was reassuring against acute CVA  -Given recent recurrence of glioblastoma, there was concern for spread or advance of disease, though both CT and MRI were reassuring to stability of current malignancy from last imaging (9/2023).   -Neurology consulted and felt movements were consistent with partial seizure, lacosamide was added, though subsequent EEG showed some degree of persistent seizure activity, and further brief clinical seizures were observed.   -Neurology re-evaluated on 11/17 and 11/18 and EEG with no seizure activity  Plan:  >No further epileptiform activity and seizures controlled with no further concern for break thru activity and adequate treatment levels of anti epileptics - 11/18  - Continue levetiracetam 1250 mg BID   - Lacosamide 100 MG BID - transitioned to oral on 10/16  -Neurology signed off on 11/18     H/o L frontoparietal glioblastoma s/p resection, chemotherapy and radiation (completed May 2021), with recurrence of malignancy 2023   Cognitive and functional impairment due to glioblastoma and chemoradiation   Brocas aphasia  Dementia  Seen by oncology on this visit, repeat imaging stable from September. Started on  trial of steroid, eval symptom improvement.   -Previous neuro psych testing (4/2023) - demonstrate dementia  -SLP evaluation - 11/17 - severe brocas aphasia  - Oncology consulted, appreciate input - signed off, s/p bevacizumab 10/18, 11/1, 11/15 - bevacizumab now on hold until outpatient follow up per discussion with Dr. Baker (outpatient oncologist) on 11/16  - Rad/Onc consulted, RT started on 10/12 for 10 fractions - finished on 10/25  - Dexamethasone 4mg IV BID  (10/9 - 10/25) - headache resolved and no new neuro symptoms. Start tapering as of 10/26 - 2 mg BID x 2 weeks (10/26-11/9), 2 mg daily x 2 weeks (11/10-11/23), then stop (taper ordered)  - TMP-SMX daily for PCP PPX (pt had hx of PJP)   - PT/OT consulted, appreciate input  -Likely LTC on discharge, significant discussion with primary MD, PMR and PT/OT, Case management on 11/13. [facility] unable to accept patient and recommendation per specialties for LTC placement on discharge     Likely aspiration pneumonitis: resolved  - brianna albuterol MDI q6H while awake   - DuoNeb QID PRN   - SLP consult completed, appreciate input. Soft diet level 6 with thin liquids.     Depression - Continue PTA Buspirone and Fluoxetine      H/o DVT - Continue PTA rivaroxaban      HTN - Continue PTA Amlodipine      Hemorrhoids   -topical preparation H  -monitor           Diet: Snacks/Supplements Adult: Other; Send Berry Magic Cup with dinner tray and allow pt/RN to order prn also (Also likes orange if in stock. NO van or ora); With Meals  Regular Diet Adult Thin Liquids (level 0)  Snacks/Supplements Adult: Other; please send Gelatein plus pineapple + whip topping @ 2:00 and cherry geltaien + whip topping @ 10:00; Between Meals    DVT Prophylaxis: DOAC  Martino Catheter: Not present  Lines: None     Cardiac Monitoring: None  Code Status: Full Code      Clinically Significant Risk Factors                  # Hypertension: Noted on problem list                   Disposition Plan              João Kent MD  Hospitalist Service, GOLD TEAM 10  [facility]  Securely message with vzaar (more info)  Text page via [employer] [place] Paging/Directory   See signed in provider for up to date coverage information  ______________________________________________________________________    Interval History   No acute events overnight. No fever or chills. Did not sleep well with EEG overnight. No concerns from family. No isssues with bowel movements. NO abdominal pain. No chest pain or shortness of breath.     Physical Exam   Vital Signs: Temp: 98.6  F (37  C) Temp src: Axillary BP: 137/64 Pulse: 71   Resp: 16 SpO2: 94 % O2 Device: None (Room air)    Weight: 151 lbs 14.35 oz    General Appearance: No acute distress, lying in bed  Respiratory: CTAB. No increased WOB.  Cardiovascular: RRR. No m/  GI: Soft. Non-tender. Non-distended.  : purewick draining yellow urine  Skin: No rash.  Neuro: right sided hemiparesis .  strength present in left hand and able to plantar and dorsiflex left foot. Sensation present to light touch on bilateral upper and lower distal extremities  Left hand tremor  Slowed responses to questions but answers appropriately     Medical Decision Making       30 MINUTES SPENT BY ME on the date of service doing chart review, history, exam, documentation & further activities per the note.      Data     I have personally reviewed the following data over the past 24 hrs:    5.8  \   10.3 (L)   / 146 (L)     140 107 37.8 (H) /  94   4.1 23 0.59 \       Imaging results reviewed over the past 24 hrs:   No results found for this or any previous visit (from the past 24 hour(s)).

## 2023-11-18 NOTE — PROGRESS NOTES
PRELIMINARY EEG REPORT:    First 90 min of vEEG was reviewed. There was an asymmetry with increased amplitudes over the left hemisphere.  Diffuse theta-delta slowing was present, with more prominent delta slowing over the left hemisphere.  No epileptiform discharges or seizures were recorded.    Findings are consistent with mild to moderate diffuse encephalopathy and an additional structural abnormality and breach rhythm over the left hemisphere.    Cielo Barrios MD

## 2023-11-18 NOTE — PROGRESS NOTES
"[facility]  Neurology Consultation - Progress Note    Patient Name:  Olga Bailey  Date of Service:  November 18, 2023    Subjective:    Discussed with epilepsy. Breach rhythm on L but no seizures or epileptiform activities seen.  She reports she feels well and denies any new or worsening neurologic symptoms.      Objective:    Vitals: BP (!) 148/78 (BP Location: Right arm)   Pulse 60   Temp 98  F (36.7  C) (Axillary)   Resp 16   Ht 1.702 m (5' 7\")   Wt 68.9 kg (151 lb 14.4 oz)   SpO2 96%   BMI 23.79 kg/m    General: Lying in bed, NAD  Head: surgical scar seen  Neurologic:  Awake and alert on arrival.  Able to tell me her and husbands name.  Able to name high frequency but not low frequency words such as nuckle.  Follows all simple commands.  Trouble with complex commands.  Pupils reactive and gaze conjugate.  R sided droop.  R dense hemiparesis in arm and leg.  Upgonig toes on R.  Does have L>R resting tremor.      Pertinent Investigations:    I have personally reviewed most recent and pertinent labs, tests, and radiological images.     EEG reviewed with epilepsy.  No epileptiform activity or seizures.      Assessment  #GBM  #Seizure disorder  #Vasogenic edema    Wang Bailey is a 78 year old female we are consutled for seizures.  Low suspicion for seizures as  is very good historian and nearly always present but due to concern from LTAC about uncontrolled seizures affecting disposition.  EEG was done.  This showed no seizures overnight and no epileptiform activity to suggest highly active or high risk seizures.  Suspect she has adequate coverage with her current AED's.  AED levels checked to have baseline levels at current doses.  Neurology will sign off.      Recommendations:   -Continue Keppra 1250 mg twice daily  -Continue lacosamide 100 mg twice daily  -Continue dexamethasone taper   -keppra, lacosamide levels ordered     Thank you for involving Neurology " in the care of Olga Bailey.      Bailey Whelan, DO

## 2023-11-18 NOTE — PLAN OF CARE
"/73 (BP Location: Right arm)   Pulse 71   Temp 97.9  F (36.6  C) (Axillary)   Resp 16   Ht 1.702 m (5' 7\")   Wt 68.9 kg (151 lb 14.4 oz)   SpO2 94%   BMI 23.79 kg/m      Pt is alert and responds to questions appropriately. She continues to experience aphasia and needs extra time to navigate conversation. She has no complaints of pain or N/V/D. She is eating well and needs encouragement to drink more fluids. She continues to be incontinent of urine and stool with a pur wick in place. Incontinence care has been provided after each episode, she declined to have a bath today. She has been turned and repositioned every 2 hours. EEG monitor was completed and removed. Will continue to follow the plan of care.    Goal Outcome Evaluation:      Plan of Care Reviewed With: patient, spouse    Overall Patient Progress: no change    Problem: Plan of Care - These are the overarching goals to be used throughout the patient stay.    Goal: Plan of Care Review  Description: The Plan of Care Review/Shift note should be completed every shift.  The Outcome Evaluation is a brief statement about your assessment that the patient is improving, declining, or no change.  This information will be displayed automatically on your shift note.  The Plan of Care Review/Shift note should be completed every shift.  The Outcome Evaluation is a brief statement about your assessment that the patient is improving, declining, or no change.  This information will be displayed automatically on your shift  note.  Recent Flowsheet Documentation  Taken 11/18/2023 1640 by Maribell Delcid, RN  Plan of Care Reviewed With:   patient   spouse  Overall Patient Progress: no change  Goal: Absence of Hospital-Acquired Illness or Injury  Intervention: Identify and Manage Fall Risk  Recent Flowsheet Documentation  Taken 11/18/2023 0824 by Maribell Delcid, RN  Safety Promotion/Fall Prevention:   assistive device/personal items within reach   clutter free " environment maintained   lighting adjusted   patient and family education   room organization consistent   safety round/check completed  Intervention: Prevent Skin Injury  Recent Flowsheet Documentation  Taken 11/18/2023 1526 by Maribell Delcid RN  Body Position:   turned   right   heels elevated   legs elevated  Taken 11/18/2023 1400 by Maribell Delcid RN  Body Position: legs elevated  Taken 11/18/2023 1234 by Maribell Delcid RN  Body Position:   heels elevated   legs elevated  Taken 11/18/2023 1000 by Maribell Delcid RN  Body Position: sitting up in bed  Taken 11/18/2023 0824 by Maribell Delcid RN  Body Position:   turned   heels elevated   lower extremity elevated  Skin Protection:   adhesive use limited   skin to device areas padded  Device Skin Pressure Protection:   absorbent pad utilized/changed   adhesive use limited   positioning supports utilized   pressure points protected   skin-to-device areas padded  Intervention: Prevent and Manage VTE (Venous Thromboembolism) Risk  Recent Flowsheet Documentation  Taken 11/18/2023 0824 by Maribell Delcid RN  VTE Prevention/Management: compression stockings off  Intervention: Prevent Infection  Recent Flowsheet Documentation  Taken 11/18/2023 1235 by Maribell Delcid RN  Infection Prevention:   environmental surveillance performed   equipment surfaces disinfected   hand hygiene promoted   rest/sleep promoted   single patient room provided   visitors restricted/screened  Taken 11/18/2023 0824 by Maribell Delcid RN  Infection Prevention:   environmental surveillance performed   equipment surfaces disinfected   hand hygiene promoted   rest/sleep promoted   single patient room provided   visitors restricted/screened  Goal: Absence of Hospital-Acquired Illness or Injury  Intervention: Identify and Manage Fall Risk  Recent Flowsheet Documentation  Taken 11/18/2023 0824 by Maribell Delcid RN  Safety Promotion/Fall Prevention:   assistive device/personal items within  reach   clutter free environment maintained   lighting adjusted   patient and family education   room organization consistent   safety round/check completed  Intervention: Prevent Skin Injury  Recent Flowsheet Documentation  Taken 11/18/2023 1526 by Maribell Delcid RN  Body Position:   turned   right   heels elevated   legs elevated  Taken 11/18/2023 1400 by Maribell Delcid RN  Body Position: legs elevated  Taken 11/18/2023 1234 by Maribell Delcid RN  Body Position:   heels elevated   legs elevated  Taken 11/18/2023 1000 by Maribell Delcid RN  Body Position: sitting up in bed  Taken 11/18/2023 0824 by Maribell Delcid RN  Body Position:   turned   heels elevated   lower extremity elevated  Skin Protection:   adhesive use limited   skin to device areas padded  Device Skin Pressure Protection:   absorbent pad utilized/changed   adhesive use limited   positioning supports utilized   pressure points protected   skin-to-device areas padded  Intervention: Prevent and Manage VTE (Venous Thromboembolism) Risk  Recent Flowsheet Documentation  Taken 11/18/2023 0824 by Maribell Delcid RN  VTE Prevention/Management: compression stockings off  Intervention: Prevent Infection  Recent Flowsheet Documentation  Taken 11/18/2023 1235 by Maribell Delcid RN  Infection Prevention:   environmental surveillance performed   equipment surfaces disinfected   hand hygiene promoted   rest/sleep promoted   single patient room provided   visitors restricted/screened  Taken 11/18/2023 0824 by Maribell Delcid RN  Infection Prevention:   environmental surveillance performed   equipment surfaces disinfected   hand hygiene promoted   rest/sleep promoted   single patient room provided   visitors restricted/screened     Problem: Adult Inpatient Plan of Care  Goal: Plan of Care Review  Description: The Plan of Care Review/Shift note should be completed every shift.  The Outcome Evaluation is a brief statement about your assessment that the  "patient is improving, declining, or no change.  This information will be displayed automatically on your shift  note.  Outcome: Progressing  Flowsheets (Taken 11/18/2023 1640)  Plan of Care Reviewed With:   patient   spouse  Overall Patient Progress: no change  Goal: Patient-Specific Goal (Individualized)  Description: You can add care plan individualizations to a care plan. Examples of Individualization might be:  \"Parent requests to be called daily at 9am for status\", \"I have a hard time hearing out of my right ear\", or \"Do not touch me to wake me up as it startles  me\".  Outcome: Progressing  Goal: Absence of Hospital-Acquired Illness or Injury  Outcome: Progressing  Intervention: Identify and Manage Fall Risk  Recent Flowsheet Documentation  Taken 11/18/2023 0824 by Maribell Delcid RN  Safety Promotion/Fall Prevention:   assistive device/personal items within reach   clutter free environment maintained   lighting adjusted   patient and family education   room organization consistent   safety round/check completed  Intervention: Prevent Skin Injury  Recent Flowsheet Documentation  Taken 11/18/2023 1526 by Maribell Delcid RN  Body Position:   turned   right   heels elevated   legs elevated  Taken 11/18/2023 1400 by Maribell Delcid RN  Body Position: legs elevated  Taken 11/18/2023 1234 by Mraibell Delcid RN  Body Position:   heels elevated   legs elevated  Taken 11/18/2023 1000 by Maribell Delcid RN  Body Position: sitting up in bed  Taken 11/18/2023 0824 by Maribell Delcid RN  Body Position:   turned   heels elevated   lower extremity elevated  Skin Protection:   adhesive use limited   skin to device areas padded  Device Skin Pressure Protection:   absorbent pad utilized/changed   adhesive use limited   positioning supports utilized   pressure points protected   skin-to-device areas padded  Intervention: Prevent and Manage VTE (Venous Thromboembolism) Risk  Recent Flowsheet Documentation  Taken 11/18/2023 " 0824 by Maribell Delcid RN  VTE Prevention/Management: compression stockings off  Intervention: Prevent Infection  Recent Flowsheet Documentation  Taken 11/18/2023 1235 by Maribell Delcid RN  Infection Prevention:   environmental surveillance performed   equipment surfaces disinfected   hand hygiene promoted   rest/sleep promoted   single patient room provided   visitors restricted/screened  Taken 11/18/2023 0824 by Maribell Delcid RN  Infection Prevention:   environmental surveillance performed   equipment surfaces disinfected   hand hygiene promoted   rest/sleep promoted   single patient room provided   visitors restricted/screened     Problem: Hypertension Comorbidity  Goal: Blood Pressure in Desired Range  Intervention: Maintain Blood Pressure Management  Recent Flowsheet Documentation  Taken 11/18/2023 0824 by Maribell Delcid RN  Medication Review/Management: medications reviewed     Problem: Thought Process Alteration  Goal: Optimal Thought Clarity  Intervention: Minimize Safety Risk and Altered Thought  Recent Flowsheet Documentation  Taken 11/18/2023 0824 by Maribell Delcid RN  Sensory Stimulation Regulation:   auditory stimulation minimized   care clustered   lighting decreased   quiet environment promoted     Problem: Swallowing Impairment  Goal: Optimal Eating/Swallowing without Aspiration  Intervention: Optimize Eating and Swallowing  Recent Flowsheet Documentation  Taken 11/18/2023 0824 by Maribell Delcid RN  Aspiration Precautions: awake/alert before oral intake     Problem: Communication Impairment  Goal: Effective Communication Skills  Intervention: Optimize Communication Skills  Recent Flowsheet Documentation  Taken 11/18/2023 0824 by Maribell Delcid RN  Communication Enhancement Strategies:   call light answered in person   extra time allowed for response   family involved in communication plan

## 2023-11-18 NOTE — PLAN OF CARE
"/64 (BP Location: Right arm)   Pulse 71   Temp 98.6  F (37  C) (Axillary)   Resp 16   Ht 1.702 m (5' 7\")   Wt 68.9 kg (151 lb 14.4 oz)   SpO2 94%   BMI 23.79 kg/m    Pt alert, needs extra time to respond to the questions. VSS on ra. Assist x2 and lift. Denies pain. Incontinent B&B, purewick in place. Voiding adequately. EEG test in progress. C/o difficulty falling sleep but able to rest since after midnight. Continue on POC.    Problem: Adult Inpatient Plan of Care  Goal: Plan of Care Review  Description: The Plan of Care Review/Shift note should be completed every shift.  The Outcome Evaluation is a brief statement about your assessment that the patient is improving, declining, or no change.  This information will be displayed automatically on your shift  note.  Outcome: Progressing   Goal Outcome Evaluation:                        "

## 2023-11-19 ENCOUNTER — APPOINTMENT (OUTPATIENT)
Dept: SPEECH THERAPY | Facility: CLINIC | Age: 78
DRG: 054 | End: 2023-11-19
Attending: STUDENT IN AN ORGANIZED HEALTH CARE EDUCATION/TRAINING PROGRAM
Payer: MEDICARE

## 2023-11-19 LAB — LACOSAMIDE SERPL-MCNC: 9 UG/ML

## 2023-11-19 PROCEDURE — 120N000005 HC R&B MS OVERFLOW UMMC

## 2023-11-19 PROCEDURE — 250N000013 HC RX MED GY IP 250 OP 250 PS 637: Performed by: INTERNAL MEDICINE

## 2023-11-19 PROCEDURE — 250N000013 HC RX MED GY IP 250 OP 250 PS 637: Performed by: STUDENT IN AN ORGANIZED HEALTH CARE EDUCATION/TRAINING PROGRAM

## 2023-11-19 PROCEDURE — 92507 TX SP LANG VOICE COMM INDIV: CPT | Mod: GN

## 2023-11-19 PROCEDURE — 99232 SBSQ HOSP IP/OBS MODERATE 35: CPT | Performed by: STUDENT IN AN ORGANIZED HEALTH CARE EDUCATION/TRAINING PROGRAM

## 2023-11-19 PROCEDURE — 250N000012 HC RX MED GY IP 250 OP 636 PS 637: Performed by: STUDENT IN AN ORGANIZED HEALTH CARE EDUCATION/TRAINING PROGRAM

## 2023-11-19 RX ADMIN — AMLODIPINE BESYLATE 5 MG: 5 TABLET ORAL at 09:14

## 2023-11-19 RX ADMIN — PANTOPRAZOLE SODIUM 40 MG: 40 TABLET, DELAYED RELEASE ORAL at 16:10

## 2023-11-19 RX ADMIN — RIVAROXABAN 20 MG: 10 TABLET, FILM COATED ORAL at 16:10

## 2023-11-19 RX ADMIN — LEVETIRACETAM 1250 MG: 750 TABLET, FILM COATED ORAL at 18:18

## 2023-11-19 RX ADMIN — ALBUTEROL SULFATE 2 PUFF: 90 AEROSOL, METERED RESPIRATORY (INHALATION) at 22:47

## 2023-11-19 RX ADMIN — LEVETIRACETAM 1250 MG: 750 TABLET, FILM COATED ORAL at 09:14

## 2023-11-19 RX ADMIN — BUSPIRONE HYDROCHLORIDE 30 MG: 30 TABLET ORAL at 09:14

## 2023-11-19 RX ADMIN — PANTOPRAZOLE SODIUM 40 MG: 40 TABLET, DELAYED RELEASE ORAL at 09:14

## 2023-11-19 RX ADMIN — BUSPIRONE HYDROCHLORIDE 30 MG: 30 TABLET ORAL at 18:17

## 2023-11-19 RX ADMIN — ALBUTEROL SULFATE 2 PUFF: 90 AEROSOL, METERED RESPIRATORY (INHALATION) at 09:15

## 2023-11-19 RX ADMIN — THERA TABS 1 TABLET: TAB at 09:14

## 2023-11-19 RX ADMIN — DEXAMETHASONE 2 MG: 2 TABLET ORAL at 09:14

## 2023-11-19 RX ADMIN — SULFAMETHOXAZOLE AND TRIMETHOPRIM 1 TABLET: 800; 160 TABLET ORAL at 09:14

## 2023-11-19 RX ADMIN — LACOSAMIDE 100 MG: 50 TABLET, FILM COATED ORAL at 18:18

## 2023-11-19 RX ADMIN — LACOSAMIDE 100 MG: 50 TABLET, FILM COATED ORAL at 09:14

## 2023-11-19 RX ADMIN — FLUOXETINE 40 MG: 20 CAPSULE ORAL at 09:14

## 2023-11-19 ASSESSMENT — ACTIVITIES OF DAILY LIVING (ADL)
ADLS_ACUITY_SCORE: 65

## 2023-11-19 NOTE — PLAN OF CARE
"/79 (BP Location: Right arm)   Pulse 82   Temp 98.1  F (36.7  C) (Axillary)   Resp 16   Ht 1.702 m (5' 7\")   Wt 71.1 kg (156 lb 12 oz)   SpO2 94%   BMI 24.55 kg/m      Pt is alert and responds appropriately to questions.VSS and no complaints of pain or N/V/D. She is eating well and needs encouragement to drink more. She has been turned and repositioned every 2 hours as she has allowed. She is incontinent of bowel and bladder. Pur wick is in place and  Incontinence care provided after each occurrence. Will continue to follow the plan of care.    Goal Outcome Evaluation:      Plan of Care Reviewed With: patient, spouse    Overall Patient Progress: no change    Problem: Plan of Care - These are the overarching goals to be used throughout the patient stay.    Goal: Plan of Care Review  Description: The Plan of Care Review/Shift note should be completed every shift.  The Outcome Evaluation is a brief statement about your assessment that the patient is improving, declining, or no change.  This information will be displayed automatically on your shift note.  The Plan of Care Review/Shift note should be completed every shift.  The Outcome Evaluation is a brief statement about your assessment that the patient is improving, declining, or no change.  This information will be displayed automatically on your shift  note.  Recent Flowsheet Documentation  Taken 11/19/2023 1718 by Maribell Delcid, RN  Plan of Care Reviewed With:   patient   spouse  Overall Patient Progress: no change  Goal: Absence of Hospital-Acquired Illness or Injury  Intervention: Identify and Manage Fall Risk  Recent Flowsheet Documentation  Taken 11/19/2023 0917 by Maribell Delcid RN  Safety Promotion/Fall Prevention:   assistive device/personal items within reach   clutter free environment maintained   lighting adjusted   patient and family education   room organization consistent   safety round/check completed  Intervention: Prevent Skin " Injury  Recent Flowsheet Documentation  Taken 11/19/2023 1459 by Maribell Delcid RN  Body Position: refuses positioning  Taken 11/19/2023 1330 by Maribell Delcid RN  Body Position:   turned   heels elevated   legs elevated  Taken 11/19/2023 0917 by Maribell Delcid RN  Body Position:   sitting up in bed   legs elevated   heels elevated  Skin Protection:   adhesive use limited   incontinence pads utilized   protective footwear used  Device Skin Pressure Protection:   absorbent pad utilized/changed   adhesive use limited   positioning supports utilized   pressure points protected  Taken 11/19/2023 0730 by Maribell Delcid RN  Body Position: position maintained  Intervention: Prevent and Manage VTE (Venous Thromboembolism) Risk  Recent Flowsheet Documentation  Taken 11/19/2023 0917 by Maribell Delcid RN  VTE Prevention/Management:   SCDs (sequential compression devices) off   compression stockings off  Intervention: Prevent Infection  Recent Flowsheet Documentation  Taken 11/19/2023 0917 by Maribell Delcid RN  Infection Prevention:   environmental surveillance performed   equipment surfaces disinfected   hand hygiene promoted   rest/sleep promoted   single patient room provided   visitors restricted/screened  Goal: Absence of Hospital-Acquired Illness or Injury  Intervention: Identify and Manage Fall Risk  Recent Flowsheet Documentation  Taken 11/19/2023 0917 by Maribell Delcid RN  Safety Promotion/Fall Prevention:   assistive device/personal items within reach   clutter free environment maintained   lighting adjusted   patient and family education   room organization consistent   safety round/check completed  Intervention: Prevent Skin Injury  Recent Flowsheet Documentation  Taken 11/19/2023 1459 by Maribell Delcid RN  Body Position: refuses positioning  Taken 11/19/2023 1330 by Maribell Delcid RN  Body Position:   turned   heels elevated   legs elevated  Taken 11/19/2023 0917 by Maribell Delcid RN  Body  Position:   sitting up in bed   legs elevated   heels elevated  Skin Protection:   adhesive use limited   incontinence pads utilized   protective footwear used  Device Skin Pressure Protection:   absorbent pad utilized/changed   adhesive use limited   positioning supports utilized   pressure points protected  Taken 11/19/2023 0730 by Maribell Delcid RN  Body Position: position maintained  Intervention: Prevent and Manage VTE (Venous Thromboembolism) Risk  Recent Flowsheet Documentation  Taken 11/19/2023 0917 by Maribell Delcid RN  VTE Prevention/Management:   SCDs (sequential compression devices) off   compression stockings off  Intervention: Prevent Infection  Recent Flowsheet Documentation  Taken 11/19/2023 0917 by Maribell Delcid RN  Infection Prevention:   environmental surveillance performed   equipment surfaces disinfected   hand hygiene promoted   rest/sleep promoted   single patient room provided   visitors restricted/screened     Problem: Hypertension Comorbidity  Goal: Blood Pressure in Desired Range  Intervention: Maintain Blood Pressure Management  Recent Flowsheet Documentation  Taken 11/19/2023 0917 by Maribell Delcid RN  Medication Review/Management: medications reviewed     Problem: Thought Process Alteration  Goal: Optimal Thought Clarity  Intervention: Minimize Safety Risk and Altered Thought  Recent Flowsheet Documentation  Taken 11/19/2023 0917 by Maribell Delcid RN  Sensory Stimulation Regulation:   auditory stimulation minimized   care clustered   lighting decreased   quiet environment promoted     Problem: Swallowing Impairment  Goal: Optimal Eating/Swallowing without Aspiration  Intervention: Optimize Eating and Swallowing  Recent Flowsheet Documentation  Taken 11/19/2023 0917 by Maribell Delcid RN  Aspiration Precautions: awake/alert before oral intake     Problem: Communication Impairment  Goal: Effective Communication Skills  Intervention: Optimize Communication Skills  Recent  Flowsheet Documentation  Taken 11/19/2023 0917 by Maribell Delcid, RN  Communication Enhancement Strategies:   call light answered in person   extra time allowed for response   family involved in communication plan

## 2023-11-19 NOTE — PROGRESS NOTES
[facility]    Medicine Progress Note - Hospitalist Service, GOLD TEAM 10    Date of Admission:  10/6/2023    Assessment & Plan   Olga Bailey is a 78 year old female admitted on 10/6/2023. She has history of L frontoparietal glioblastoma s/p resection, chemotherapy and radiation with remission in 2021, recently found to have recurrence in September 2023, with baseline cognitive and functional impairment, h/o seizures, h/o DVT on xarelto, HTN, and depression who presented 10/6/23 with acute worsening of baseline aphasia as well as new facial twitching. Admitted to internal medicine for further work up and management. Neurology consulted and patient found to be having partial seizures for which lacosamide was added to keppra with improvement in seizure activity. Continued radiotherapy while on [facility] but has now transferred to [place] for concomitant bevacizumab. Tolerated bevacizumab well however developed worsening headaches and tiredness at reduced dose of steroids so increased back up to 4 mg BID per rad onc. Completed 10 days of radiation on 10/25/23. Has received ongoing bevacizumab per treatment cycle, last cycle finished on 11/15 and further treatment delayed until outpatient follow up with Dr. Baker neuro oncolgoist.  Medically ready to discharge to hopefully TCU.    Interval Changes:  -Ongoing work toward disposition    Discharge planning  Medically stable for discharge pending safe discharge plan  - 11/13 - primary MD had long discussion with pts  and CAMRON Miranda about discharge planning. Discussed that unfortunately TCU is not an option at this point and we need to move forward with LTC.   - 11/9 - discussed at weekly SWAT meeting due to difficult discharge.  - Recommended looking into options for increasing care at assisted living vs LTC. This was discussed with pt, daughter and  by SW on 11/9.  - RNCC/SW team working on safe discharge  plan, greatly appreciate assistance. Looking at community TCU options and LTC, has been refused by  TCU multiple times.  - PM&R consulted this admission to assist with evaluation for appropriate dispo and assess rehab potential, last evaluated on 11/13     Partial Seizure, controlled -no further seizure  Chronic aphasia   Difficulty Swallowing  -Presented with acute (< 1 day) worsening of aphasia and new facial twitching with abnormal mouth movements concerning for partial seizure.  She underwent stroke eval in the ED which was reassuring against acute CVA  -Given recent recurrence of glioblastoma, there was concern for spread or advance of disease, though both CT and MRI were reassuring to stability of current malignancy from last imaging (9/2023).   -Neurology consulted and felt movements were consistent with partial seizure, lacosamide was added, though subsequent EEG showed some degree of persistent seizure activity, and further brief clinical seizures were observed.   -Neurology re-evaluated on 11/17 and 11/18 and EEG with no seizure activity  Plan:  >No further epileptiform activity and seizures controlled with no further concern for break thru activity and adequate treatment levels of anti epileptics - 11/18  - Continue levetiracetam 1250 mg BID   - Lacosamide 100 MG BID - transitioned to oral on 10/16  -Neurology signed off on 11/18     H/o L frontoparietal glioblastoma s/p resection, chemotherapy and radiation (completed May 2021), with recurrence of malignancy 2023   Cognitive and functional impairment due to glioblastoma and chemoradiation   Brocas aphasia  Dementia  Seen by oncology on this visit, repeat imaging stable from September. Started on trial of steroid, eval symptom improvement.   -Previous neuro psych testing (4/2023) - demonstrate dementia  -SLP evaluation - 11/17 - severe brocas aphasia  - Oncology consulted, appreciate input - signed off, s/p bevacizumab 10/18, 11/1, 11/15 - bevacizumab  now on hold until outpatient follow up per discussion with Dr. Baker (outpatient oncologist) on 11/16  - Rad/Onc consulted, RT started on 10/12 for 10 fractions - finished on 10/25  - Dexamethasone 4mg IV BID  (10/9 - 10/25) - headache resolved and no new neuro symptoms. Start tapering as of 10/26 - 2 mg BID x 2 weeks (10/26-11/9), 2 mg daily x 2 weeks (11/10-11/23), then stop (taper ordered)  - TMP-SMX daily for PCP PPX (pt had hx of PJP)   - PT/OT consulted, appreciate input  -Likely LTC on discharge, significant discussion with primary MD, PMR and PT/OT, Case management on 11/13. [facility] unable to accept patient and recommendation per specialties for LTC placement on discharge     Likely aspiration pneumonitis: resolved  - brianna albuterol MDI q6H while awake   - DuoNeb QID PRN   - SLP consult completed, appreciate input. Soft diet level 6 with thin liquids.     Depression - Continue PTA Buspirone and Fluoxetine      H/o DVT - Continue PTA rivaroxaban      HTN - Continue PTA Amlodipine      Hemorrhoids   -topical preparation H  -monitor           Diet: Snacks/Supplements Adult: Other; Send Berry Magic Cup with dinner tray and allow pt/RN to order prn also (Also likes orange if in stock. NO van or ora); With Meals  Regular Diet Adult Thin Liquids (level 0)  Snacks/Supplements Adult: Other; please send Gelatein plus pineapple + whip topping @ 2:00 and cherry geltaien + whip topping @ 10:00; Between Meals    DVT Prophylaxis: DOAC  Martino Catheter: Not present  Lines: None     Cardiac Monitoring: None  Code Status: Full Code      Clinically Significant Risk Factors                  # Hypertension: Noted on problem list                   Disposition Plan             João Kent MD  Hospitalist Service, GOLD TEAM [address] [place] [address]  Securely message with [employer] (more info)  Text page via [employer] Paging/Directory   See signed in provider for up to date coverage  information  ______________________________________________________________________    Interval History   No acute events overnight. She denies any symptoms. NO pain. No anxiety. No nausea or vomiting. No shortness of breath. Lying in bed resting.     Physical Exam   Vital Signs: Temp: 97.4  F (36.3  C) Temp src: Axillary BP: 131/79 Pulse: 75   Resp: 16 SpO2: 94 % O2 Device: None (Room air)    Weight: 151 lbs 14.35 oz    General Appearance: No acute distress, lying in bed  Mouth: lip smacking noted  Respiratory: CTAB. No increased WOB.  Cardiovascular: RRR. No m/  GI: Soft. Non-tender. Non-distended.  : purewick draining yellow urine  Skin: No rash.  Neuro: right sided hemiparesis .  strength present in left hand and able to plantar and dorsiflex left foot. Sensation present to light touch on bilateral upper and lower distal extremities  Left hand tremor  Slowed responses to questions but answers appropriately     Medical Decision Making       30 MINUTES SPENT BY ME on the date of service doing chart review, history, exam, documentation & further activities per the note.      Data         Imaging results reviewed over the past 24 hrs:   No results found for this or any previous visit (from the past 24 hour(s)).

## 2023-11-19 NOTE — PLAN OF CARE
D AVSS with sat's 93% on room air. Heart regular and lungs decreased. Voiced no c/o pain or nausea overnight and slept well between cares. Purewick in place but a minimal amount of urine is picked up by the Purewick device so remains incontinent. Coccyx slightly red with no open areas. Criticade paste applied. Took in a small amount of fluids when strongly encouraged. Turned/repositioned with good brad care provided.   I Vital's, assessment and med's per order.  A Resting in bed with call light in reach.  P Continue to monitor and update MD with changes.

## 2023-11-20 ENCOUNTER — APPOINTMENT (OUTPATIENT)
Dept: OCCUPATIONAL THERAPY | Facility: CLINIC | Age: 78
DRG: 054 | End: 2023-11-20
Attending: STUDENT IN AN ORGANIZED HEALTH CARE EDUCATION/TRAINING PROGRAM
Payer: MEDICARE

## 2023-11-20 ENCOUNTER — APPOINTMENT (OUTPATIENT)
Dept: PHYSICAL THERAPY | Facility: CLINIC | Age: 78
DRG: 054 | End: 2023-11-20
Attending: STUDENT IN AN ORGANIZED HEALTH CARE EDUCATION/TRAINING PROGRAM
Payer: MEDICARE

## 2023-11-20 ENCOUNTER — APPOINTMENT (OUTPATIENT)
Dept: SPEECH THERAPY | Facility: CLINIC | Age: 78
DRG: 054 | End: 2023-11-20
Attending: STUDENT IN AN ORGANIZED HEALTH CARE EDUCATION/TRAINING PROGRAM
Payer: MEDICARE

## 2023-11-20 PROCEDURE — 250N000013 HC RX MED GY IP 250 OP 250 PS 637: Performed by: STUDENT IN AN ORGANIZED HEALTH CARE EDUCATION/TRAINING PROGRAM

## 2023-11-20 PROCEDURE — 97535 SELF CARE MNGMENT TRAINING: CPT | Mod: GO

## 2023-11-20 PROCEDURE — 99232 SBSQ HOSP IP/OBS MODERATE 35: CPT | Performed by: STUDENT IN AN ORGANIZED HEALTH CARE EDUCATION/TRAINING PROGRAM

## 2023-11-20 PROCEDURE — 92507 TX SP LANG VOICE COMM INDIV: CPT | Mod: GN

## 2023-11-20 PROCEDURE — 97112 NEUROMUSCULAR REEDUCATION: CPT | Mod: GP | Performed by: PHYSICAL THERAPIST

## 2023-11-20 PROCEDURE — 120N000005 HC R&B MS OVERFLOW UMMC

## 2023-11-20 PROCEDURE — 250N000012 HC RX MED GY IP 250 OP 636 PS 637: Performed by: STUDENT IN AN ORGANIZED HEALTH CARE EDUCATION/TRAINING PROGRAM

## 2023-11-20 PROCEDURE — 97530 THERAPEUTIC ACTIVITIES: CPT | Mod: GP | Performed by: PHYSICAL THERAPIST

## 2023-11-20 PROCEDURE — 97110 THERAPEUTIC EXERCISES: CPT | Mod: GP | Performed by: PHYSICAL THERAPIST

## 2023-11-20 PROCEDURE — 250N000013 HC RX MED GY IP 250 OP 250 PS 637: Performed by: INTERNAL MEDICINE

## 2023-11-20 RX ADMIN — ALBUTEROL SULFATE 2 PUFF: 90 AEROSOL, METERED RESPIRATORY (INHALATION) at 08:03

## 2023-11-20 RX ADMIN — LEVETIRACETAM 1250 MG: 750 TABLET, FILM COATED ORAL at 18:49

## 2023-11-20 RX ADMIN — ALBUTEROL SULFATE 2 PUFF: 90 AEROSOL, METERED RESPIRATORY (INHALATION) at 19:09

## 2023-11-20 RX ADMIN — PANTOPRAZOLE SODIUM 40 MG: 40 TABLET, DELAYED RELEASE ORAL at 17:17

## 2023-11-20 RX ADMIN — DEXAMETHASONE 2 MG: 2 TABLET ORAL at 08:03

## 2023-11-20 RX ADMIN — BUSPIRONE HYDROCHLORIDE 30 MG: 30 TABLET ORAL at 18:49

## 2023-11-20 RX ADMIN — RIVAROXABAN 20 MG: 10 TABLET, FILM COATED ORAL at 17:17

## 2023-11-20 RX ADMIN — LACOSAMIDE 100 MG: 50 TABLET, FILM COATED ORAL at 18:49

## 2023-11-20 RX ADMIN — BUSPIRONE HYDROCHLORIDE 30 MG: 30 TABLET ORAL at 08:03

## 2023-11-20 RX ADMIN — ALBUTEROL SULFATE 2 PUFF: 90 AEROSOL, METERED RESPIRATORY (INHALATION) at 14:47

## 2023-11-20 RX ADMIN — FLUOXETINE 40 MG: 20 CAPSULE ORAL at 08:03

## 2023-11-20 RX ADMIN — LACOSAMIDE 100 MG: 50 TABLET, FILM COATED ORAL at 08:03

## 2023-11-20 RX ADMIN — PANTOPRAZOLE SODIUM 40 MG: 40 TABLET, DELAYED RELEASE ORAL at 08:03

## 2023-11-20 RX ADMIN — THERA TABS 1 TABLET: TAB at 08:03

## 2023-11-20 RX ADMIN — SULFAMETHOXAZOLE AND TRIMETHOPRIM 1 TABLET: 800; 160 TABLET ORAL at 08:03

## 2023-11-20 RX ADMIN — AMLODIPINE BESYLATE 5 MG: 5 TABLET ORAL at 08:03

## 2023-11-20 RX ADMIN — LEVETIRACETAM 1250 MG: 750 TABLET, FILM COATED ORAL at 08:03

## 2023-11-20 ASSESSMENT — ACTIVITIES OF DAILY LIVING (ADL)
ADLS_ACUITY_SCORE: 65

## 2023-11-20 NOTE — PROGRESS NOTES
[facility]    Medicine Progress Note - Hospitalist Service, GOLD TEAM 10    Date of Admission:  10/6/2023    Assessment & Plan   Olga Bailey is a 78 year old female admitted on 10/6/2023. She has history of L frontoparietal glioblastoma s/p resection, chemotherapy and radiation with remission in 2021, recently found to have recurrence in September 2023, with baseline cognitive and functional impairment, h/o seizures, h/o DVT on xarelto, HTN, and depression who presented 10/6/23 with acute worsening of baseline aphasia as well as new facial twitching. Admitted to internal medicine for further work up and management. Neurology consulted and patient found to be having partial seizures for which lacosamide was added to keppra with improvement in seizure activity. Continued radiotherapy while on [facility] but has now transferred to [place] for concomitant bevacizumab. Tolerated bevacizumab well however developed worsening headaches and tiredness at reduced dose of steroids so increased back up to 4 mg BID per rad onc. Completed 10 days of radiation on 10/25/23. Has received ongoing bevacizumab per treatment cycle, last cycle finished on 11/15 and further treatment delayed until outpatient follow up with Dr. Baker neuro oncolgoist.  Medically ready to discharge to hopefully TCU.    Interval Changes:  -Ongoing work toward disposition  -Reached out to her radiation oncologist Dr. Loomis regarding follow up     Discharge planning  Medically stable for discharge pending safe discharge plan  - 11/13 - primary MD had long discussion with pts  and SW Ruth about discharge planning. Discussed that unfortunately TCU is not an option at this point and we need to move forward with LTC.   - 11/9 - discussed at weekly SWAT meeting due to difficult discharge.  - Recommended looking into options for increasing care at assisted living vs LTC. This was discussed with pt,  daughter and  by CAMRON on 11/9.  - RNCC/SW team working on safe discharge plan, greatly appreciate assistance. Looking at community TCU options and LTC, has been refused by  TCU multiple times.  - PM&R consulted this admission to assist with evaluation for appropriate dispo and assess rehab potential, last evaluated on 11/13     Partial Seizure, controlled -no further seizure  Chronic aphasia   -Presented with acute (< 1 day) worsening of aphasia and new facial twitching with abnormal mouth movements concerning for partial seizure.  She underwent stroke eval in the ED which was reassuring against acute CVA  -Given recent recurrence of glioblastoma, there was concern for spread or advance of disease, though both CT and MRI were reassuring to stability of current malignancy from last imaging (9/2023).   -Neurology consulted and felt movements were consistent with partial seizure, lacosamide was added, though subsequent EEG showed some degree of persistent seizure activity, and further brief clinical seizures were observed.   -Neurology re-evaluated on 11/17 and 11/18 and EEG with no seizure activity  Plan:  >No further epileptiform activity and seizures controlled with no further concern for break thru activity and adequate treatment levels of anti epileptics - 11/18  - Continue levetiracetam 1250 mg BID   - Lacosamide 100 MG BID - transitioned to oral on 10/16  -Neurology signed off on 11/18     H/o L frontoparietal glioblastoma s/p resection, chemotherapy and radiation (completed May 2021), with recurrence of malignancy 2023   Cognitive and functional impairment due to glioblastoma and chemoradiation   Brocas aphasia  Dementia  Seen by oncology on this visit, repeat imaging stable from September. Started on trial of steroid, eval symptom improvement.   -Previous neuro psych testing (4/2023) - demonstrate dementia  -SLP evaluation - 11/17 - severe brocas aphasia  - Oncology consulted, appreciate input - signed  off, s/p bevacizumab 10/18, 11/1, 11/15 - bevacizumab now on hold until outpatient follow up per discussion with Dr. Baker (outpatient oncologist) on 11/16  - Rad/Onc consulted, RT started on 10/12 for 10 fractions - finished on 10/25  - Dexamethasone 4mg IV BID  (10/9 - 10/25) - headache resolved and no new neuro symptoms. Start tapering as of 10/26 - 2 mg BID x 2 weeks (10/26-11/9), 2 mg daily x 2 weeks (11/10-11/23), then stop (taper ordered)  - TMP-SMX daily for PCP PPX (pt had hx of PJP)   - PT/OT consulted, appreciate input  -Likely LTC on discharge, significant discussion with primary MD, PMR and PT/OT, Case management on 11/13. [facility] unable to accept patient and recommendation per specialties for LTC placement on discharge     Likely aspiration pneumonitis: resolved  - brianna albuterol MDI q6H while awake   - DuoNeb QID PRN   - SLP consult completed, appreciate input. Soft diet level 6 with thin liquids.     Depression - Continue PTA Buspirone and Fluoxetine      H/o DVT - Continue PTA rivaroxaban      HTN - Continue PTA Amlodipine      Hemorrhoids   -topical preparation H  -monitor           Diet: Snacks/Supplements Adult: Other; Send Berry Magic Cup with dinner tray and allow pt/RN to order prn also (Also likes orange if in stock. NO van or ora); With Meals  Regular Diet Adult Thin Liquids (level 0)  Snacks/Supplements Adult: Other; please send Gelatein plus pineapple + whip topping @ 2:00 and cherry geltaien + whip topping @ 10:00; Between Meals    DVT Prophylaxis: DOAC  Martino Catheter: Not present  Lines: None     Cardiac Monitoring: None  Code Status: Full Code      Clinically Significant Risk Factors                  # Hypertension: Noted on problem list                   Disposition Plan             João Kent MD  Hospitalist Service, GOLD TEAM [address] [place] [address]  Securely message with [employer] (more info)  Text page via [employer] Paging/Directory   See  signed in provider for up to date coverage information  ______________________________________________________________________    Interval History   No acute events overnight. She denies any symptoms. NO pain. No anxiety. No nausea or vomiting. No shortness of breath. Updated  at bedside and requested that we reach out to her radiation oncologist, Dr. Loomis.     Physical Exam   Vital Signs: Temp: 97.4  F (36.3  C) Temp src: Oral BP: 135/71 Pulse: 72   Resp: 16 SpO2: 94 % O2 Device: None (Room air)    Weight: 156 lbs 11.95 oz    General Appearance: No acute distress, lying in bed  Mouth: lip smacking noted  Respiratory: CTAB. No increased WOB.  Cardiovascular: RRR. No m/  GI: Soft. Non-tender. Non-distended.  : purewick draining yellow urine  Skin: No rash.  Neuro: right sided hemiparesis .  strength present in left hand and able to plantar and dorsiflex left foot. Sensation present to light touch on bilateral upper and lower distal extremities  Left hand tremor  Slowed responses to questions but answers appropriately     Medical Decision Making       30 MINUTES SPENT BY ME on the date of service doing chart review, history, exam, documentation & further activities per the note.      Data         Imaging results reviewed over the past 24 hrs:   No results found for this or any previous visit (from the past 24 hour(s)).

## 2023-11-20 NOTE — PLAN OF CARE
D AVSS with sat's 94% on room air. Heart regular and lungs slightly reduced in bases otherwise clear. Voiced no c/o pain or nausea during the night and slept well between cares. Purewick in place but urine occasionally bypass the Purewick causing incontinence. Nancy area slightly red and paste was applied. Turning/repositioning when patient allows.   I Vital's, assessment and med's per order.  A Resting in bed with call light in reach.   P Continue to monitor and update MD with changes.

## 2023-11-21 ENCOUNTER — APPOINTMENT (OUTPATIENT)
Dept: PHYSICAL THERAPY | Facility: CLINIC | Age: 78
DRG: 054 | End: 2023-11-21
Attending: STUDENT IN AN ORGANIZED HEALTH CARE EDUCATION/TRAINING PROGRAM
Payer: MEDICARE

## 2023-11-21 ENCOUNTER — APPOINTMENT (OUTPATIENT)
Dept: OCCUPATIONAL THERAPY | Facility: CLINIC | Age: 78
DRG: 054 | End: 2023-11-21
Attending: STUDENT IN AN ORGANIZED HEALTH CARE EDUCATION/TRAINING PROGRAM
Payer: MEDICARE

## 2023-11-21 ENCOUNTER — APPOINTMENT (OUTPATIENT)
Dept: SPEECH THERAPY | Facility: CLINIC | Age: 78
DRG: 054 | End: 2023-11-21
Attending: STUDENT IN AN ORGANIZED HEALTH CARE EDUCATION/TRAINING PROGRAM
Payer: MEDICARE

## 2023-11-21 PROCEDURE — 250N000012 HC RX MED GY IP 250 OP 636 PS 637: Performed by: STUDENT IN AN ORGANIZED HEALTH CARE EDUCATION/TRAINING PROGRAM

## 2023-11-21 PROCEDURE — 97530 THERAPEUTIC ACTIVITIES: CPT | Mod: GP | Performed by: PHYSICAL THERAPIST

## 2023-11-21 PROCEDURE — 92507 TX SP LANG VOICE COMM INDIV: CPT | Mod: GN | Performed by: REHABILITATION PRACTITIONER

## 2023-11-21 PROCEDURE — 99232 SBSQ HOSP IP/OBS MODERATE 35: CPT | Performed by: INTERNAL MEDICINE

## 2023-11-21 PROCEDURE — 97112 NEUROMUSCULAR REEDUCATION: CPT | Mod: GP | Performed by: PHYSICAL THERAPIST

## 2023-11-21 PROCEDURE — 250N000013 HC RX MED GY IP 250 OP 250 PS 637: Performed by: INTERNAL MEDICINE

## 2023-11-21 PROCEDURE — 97110 THERAPEUTIC EXERCISES: CPT | Mod: GP | Performed by: PHYSICAL THERAPIST

## 2023-11-21 PROCEDURE — 250N000013 HC RX MED GY IP 250 OP 250 PS 637: Performed by: STUDENT IN AN ORGANIZED HEALTH CARE EDUCATION/TRAINING PROGRAM

## 2023-11-21 PROCEDURE — 120N000005 HC R&B MS OVERFLOW UMMC

## 2023-11-21 PROCEDURE — 97535 SELF CARE MNGMENT TRAINING: CPT | Mod: GO

## 2023-11-21 RX ADMIN — FLUOXETINE 40 MG: 20 CAPSULE ORAL at 08:47

## 2023-11-21 RX ADMIN — DEXAMETHASONE 2 MG: 2 TABLET ORAL at 08:47

## 2023-11-21 RX ADMIN — LEVETIRACETAM 1250 MG: 750 TABLET, FILM COATED ORAL at 18:43

## 2023-11-21 RX ADMIN — LACOSAMIDE 100 MG: 50 TABLET, FILM COATED ORAL at 18:43

## 2023-11-21 RX ADMIN — LEVETIRACETAM 1250 MG: 750 TABLET, FILM COATED ORAL at 08:46

## 2023-11-21 RX ADMIN — ALBUTEROL SULFATE 2 PUFF: 90 AEROSOL, METERED RESPIRATORY (INHALATION) at 18:44

## 2023-11-21 RX ADMIN — LACOSAMIDE 100 MG: 50 TABLET, FILM COATED ORAL at 08:52

## 2023-11-21 RX ADMIN — ALBUTEROL SULFATE 2 PUFF: 90 AEROSOL, METERED RESPIRATORY (INHALATION) at 08:50

## 2023-11-21 RX ADMIN — PANTOPRAZOLE SODIUM 40 MG: 40 TABLET, DELAYED RELEASE ORAL at 08:47

## 2023-11-21 RX ADMIN — RIVAROXABAN 20 MG: 10 TABLET, FILM COATED ORAL at 17:17

## 2023-11-21 RX ADMIN — PANTOPRAZOLE SODIUM 40 MG: 40 TABLET, DELAYED RELEASE ORAL at 17:17

## 2023-11-21 RX ADMIN — THERA TABS 1 TABLET: TAB at 08:46

## 2023-11-21 RX ADMIN — SULFAMETHOXAZOLE AND TRIMETHOPRIM 1 TABLET: 800; 160 TABLET ORAL at 08:47

## 2023-11-21 RX ADMIN — AMLODIPINE BESYLATE 5 MG: 5 TABLET ORAL at 08:46

## 2023-11-21 RX ADMIN — BUSPIRONE HYDROCHLORIDE 30 MG: 30 TABLET ORAL at 08:45

## 2023-11-21 RX ADMIN — ALBUTEROL SULFATE 2 PUFF: 90 AEROSOL, METERED RESPIRATORY (INHALATION) at 14:36

## 2023-11-21 RX ADMIN — BUSPIRONE HYDROCHLORIDE 30 MG: 30 TABLET ORAL at 18:43

## 2023-11-21 ASSESSMENT — ACTIVITIES OF DAILY LIVING (ADL)
ADLS_ACUITY_SCORE: 65
ADLS_ACUITY_SCORE: 63
ADLS_ACUITY_SCORE: 65
ADLS_ACUITY_SCORE: 63

## 2023-11-21 NOTE — PLAN OF CARE
"/71 (BP Location: Left arm)   Pulse 73   Temp 98.5  F (36.9  C) (Axillary)   Resp 16   Ht 1.702 m (5' 7\")   Wt 71.1 kg (156 lb 12 oz)   SpO2 93%   BMI 24.55 kg/m    Pt alert and oriented x3. VSS on ra. Up with assist x2 with lift. Denies pain and nausea. Purewick in place, voiding adequately. Waiting replacement. Continue on POC.  Problem: Adult Inpatient Plan of Care  Goal: Plan of Care Review  Description: The Plan of Care Review/Shift note should be completed every shift.  The Outcome Evaluation is a brief statement about your assessment that the patient is improving, declining, or no change.  This information will be displayed automatically on your shift  note.  Outcome: Progressing   Goal Outcome Evaluation:                        "

## 2023-11-21 NOTE — PLAN OF CARE
"/82 (BP Location: Left arm)   Pulse 83   Temp 97.4  F (36.3  C) (Axillary)   Resp 16   Ht 1.702 m (5' 7\")   Wt 71.1 kg (156 lb 12 oz)   SpO2 93%   BMI 24.55 kg/m      AVSS on RA. Disoriented to time. Denies pain and nausea. Incontinent of urine and stool, purewick in place. Repositioned q2 as pt allowed. Continue with POC    Problem: Adult Inpatient Plan of Care  Goal: Absence of Hospital-Acquired Illness or Injury  Outcome: Progressing  Intervention: Identify and Manage Fall Risk  Recent Flowsheet Documentation  Taken 11/20/2023 1230 by Capo Mcguire RN  Safety Promotion/Fall Prevention:   assistive device/personal items within reach   clutter free environment maintained   activity supervised   increase visualization of patient   nonskid shoes/slippers when out of bed   patient and family education   room organization consistent   safety round/check completed  Taken 11/20/2023 0800 by Capo Mcguire RN  Safety Promotion/Fall Prevention:   assistive device/personal items within reach   clutter free environment maintained   patient and family education   room organization consistent   safety round/check completed   increase visualization of patient   nonskid shoes/slippers when out of bed   supervised activity  Intervention: Prevent Skin Injury  Recent Flowsheet Documentation  Taken 11/20/2023 1230 by Capo Mcguire RN  Body Position: (up in chair)   tilted   other (see comments)  Taken 11/20/2023 1030 by Capo Mcguire RN  Body Position:   turned   right  Taken 11/20/2023 0830 by Capo Mcguire RN  Body Position:   turned   left  "

## 2023-11-21 NOTE — PLAN OF CARE
"/80 (BP Location: Left arm)   Pulse 85   Temp 97.6  F (36.4  C) (Axillary)   Resp 16   Ht 1.702 m (5' 7\")   Wt 71.1 kg (156 lb 12 oz)   SpO2 94%   BMI 24.55 kg/m      AVSS on RA. Unable to assess orientation due to aphasia. Up with lift device and turned q2. Denies pain and nausea. Purewick in place with good UOP. Bm x1. Waiting for discharge placement. Continue with POC    Problem: Adult Inpatient Plan of Care  Goal: Plan of Care Review  Description: The Plan of Care Review/Shift note should be completed every shift.  The Outcome Evaluation is a brief statement about your assessment that the patient is improving, declining, or no change.  This information will be displayed automatically on your shift  note.  Outcome: Progressing  Goal: Patient-Specific Goal (Individualized)  Description: You can add care plan individualizations to a care plan. Examples of Individualization might be:  \"Parent requests to be called daily at 9am for status\", \"I have a hard time hearing out of my right ear\", or \"Do not touch me to wake me up as it startles  me\".  Outcome: Progressing  Goal: Absence of Hospital-Acquired Illness or Injury  Outcome: Progressing  Intervention: Identify and Manage Fall Risk  Recent Flowsheet Documentation  Taken 11/21/2023 1500 by Capo Mcguire, RN  Safety Promotion/Fall Prevention:   activity supervised   assistive device/personal items within reach   clutter free environment maintained   increase visualization of patient   nonskid shoes/slippers when out of bed   patient and family education   room organization consistent   safety round/check completed  Taken 11/21/2023 0845 by Capo Mcguire, RN  Safety Promotion/Fall Prevention:   activity supervised   assistive device/personal items within reach   clutter free environment maintained   increase visualization of patient   nonskid shoes/slippers when out of bed   patient and family education   room organization consistent   safety round/check " completed  Taken 11/21/2023 0725 by Capo Mcguire, RN  Safety Promotion/Fall Prevention: safety round/check completed  Intervention: Prevent Skin Injury  Recent Flowsheet Documentation  Taken 11/21/2023 1700 by Capo Mcguire, RN  Body Position:   turned   heels elevated

## 2023-11-21 NOTE — PROGRESS NOTES
[facility]    Medicine Progress Note - Hospitalist Service, GOLD TEAM 10    Date of Admission:  10/6/2023    Assessment & Plan   Olga Bailey is a 78 year old female admitted on 10/6/2023. She has history of L frontoparietal glioblastoma s/p resection, chemotherapy and radiation with remission in 2021, recently found to have recurrence in September 2023, with baseline cognitive and functional impairment, h/o seizures, h/o DVT on xarelto, HTN, and depression who presented 10/6/23 with acute worsening of baseline aphasia as well as new facial twitching. Admitted to internal medicine for further work up and management. Neurology consulted and patient found to be having partial seizures for which lacosamide was added to keppra with improvement in seizure activity. Continued radiotherapy while on [facility] but has now transferred to [place] for concomitant bevacizumab. Tolerated bevacizumab well however developed worsening headaches and tiredness at reduced dose of steroids so increased back up to 4 mg BID per rad onc. Completed 10 days of radiation on 10/25/23. Has received ongoing bevacizumab per treatment cycle, last cycle finished on 11/15 and further treatment delayed until outpatient follow up with Dr. Baker neuro oncolgoist.  Medically ready to discharge. Ongoing dispo planning, current planning for LTACH.    Interval Changes:  - Ongoing work toward disposition  - continue PT/OT/ST til discharge  -Reaching out to her radiation oncologist Dr. Loomis regarding follow up planning    Discharge planning  Medically stable for discharge pending safe discharge plan  - 11/13 - primary MD had long discussion with pts  and CAMRON Murciaice about discharge planning. Discussed that unfortunately TCU is not an option at this point and we need to move forward with LTC.   - 11/9 - discussed at weekly SWAT meeting due to difficult discharge.  - Recommended looking into options  for increasing care at assisted living vs LTC. This was discussed with pt, daughter and  by CAMRON on 11/9.  - RNCC/SW team working on safe discharge plan, greatly appreciate assistance. Looking at community TCU options and LTC, has been refused by  TCU multiple times.  - PM&R consulted this admission to assist with evaluation for appropriate dispo and assess rehab potential, last evaluated on 11/13  [ ] Dispo: ensure PM&R f/up in place     Partial Seizure, controlled -no further seizure  Chronic aphasia   -Presented with acute (< 1 day) worsening of aphasia and new facial twitching with abnormal mouth movements concerning for partial seizure.  She underwent stroke eval in the ED which was reassuring against acute CVA  -Given recent recurrence of glioblastoma, there was concern for spread or advance of disease, though both CT and MRI were reassuring to stability of current malignancy from last imaging (9/2023).   -Neurology consulted and felt movements were consistent with partial seizure, lacosamide was added, though subsequent EEG showed some degree of persistent seizure activity, and further brief clinical seizures were observed.   -Neurology re-evaluated on 11/17 and 11/18 and EEG with no further epileptiform activity and seizures controlled with no further concern for break thru activity and adequate treatment levels of anti epileptics  Plan:  - Neurology consulted, signed off 11/18  - Continue levetiracetam 1250 mg BID   - Lacosamide 100 MG BID  - Steroid taper as below  - seizure precautions     H/o L frontoparietal glioblastoma s/p resection, chemotherapy and radiation (completed May 2021), with recurrence of malignancy 2023   Cognitive and functional impairment due to glioblastoma and chemoradiation   Brocas aphasia  Dementia  Seen by oncology on this visit, repeat imaging stable from September. Started on trial of steroid, eval symptom improvement.   -Previous neuro psych testing (4/2023) -  demonstrate dementia  -SLP evaluation - 11/17 - severe brocas aphasia  - Oncology consulted, appreciate input - signed off, s/p bevacizumab 10/18, 11/1, 11/15 - bevacizumab now on hold until outpatient follow up per discussion with Dr. Baker (outpatient oncologist) on 11/16  - Rad/Onc consulted, RT started on 10/12 for 10 fractions - finished on 10/25  - Dexamethasone 4mg IV BID  (10/9 - 10/25) - headache resolved and no new neuro symptoms. Start tapering as of 10/26 - 2 mg BID x 2 weeks (10/26-11/9), 2 mg daily x 2 weeks (11/10-11/23), then stop (taper ordered)  - TMP-SMX daily for PCP PPX (pt had hx of PJP)   - PT/OT consulted, appreciate input  [ ] dispo: Likely LTC on discharge, significant discussion with primary MD, PMR and PT/OT, Case management on 11/13. [facility] unable to accept patient and recommendation per specialties for LTC placement on discharge     Likely aspiration pneumonitis: resolved  - brianna albuterol MDI q6H while awake   - DuoNeb QID PRN   - SLP consult completed, appreciate input. Soft diet level 6 with thin liquids.     Depression - Continue PTA Buspirone and Fluoxetine      H/o DVT - Continue PTA rivaroxaban      HTN - Continue PTA Amlodipine      Hemorrhoids   -topical preparation H  -monitor           Diet: Snacks/Supplements Adult: Other; Send Berry Magic Cup with dinner tray and allow pt/RN to order prn also (Also likes orange if in stock. NO van or ora); With Meals  Regular Diet Adult Thin Liquids (level 0)  Snacks/Supplements Adult: Other; please send Gelatein plus pineapple + whip topping @ 2:00 and cherry geltaien + whip topping @ 10:00; Between Meals    DVT Prophylaxis: DOAC  Martino Catheter: Not present  Lines: None     Cardiac Monitoring: None  Code Status: Full Code      Clinically Significant Risk Factors                  # Hypertension: Noted on problem list                   Disposition Plan      Expected Discharge Date: 11/21/2023    Discharge Delays: Placement -  TCU  Destination: assisted living;nursing home  Discharge Comments: LTC - no seizures concerns per neuro. Still med ready          Olya Vences MD  Hospitalist Service, GOLD TEAM 10  [facility]  Securely message with [employer] (more info)  Text page via [employer] Paging/Directory   See signed in provider for up to date coverage information  ______________________________________________________________________    Interval History   No acute events overnight. She denies any symptoms. NO pain. No anxiety. No nausea or vomiting. No shortness of breath. Voiding/stooling appropriately. Eating mashed potatoes and roast beef w/ gravy, reports good appetite and no issues w/ swallowing dysphagia.  at bedside assisting. Patient seen by speech therapy today but unsure what was worked on per . Remains medically ready for discharge, RAMESH/CAMRON team working on discharge planning/coordination at this time.     Physical Exam   Vital Signs: Temp: 98.5  F (36.9  C) Temp src: Axillary BP: 129/71 Pulse: 73   Resp: 16 SpO2: 93 % O2 Device: None (Room air)    Weight: 156 lbs 11.95 oz    General Appearance: No acute distress, lying in bed  Mouth: lip smacking noted  Respiratory: CTAB. No increased WOB. On RA  Cardiovascular: RRR. No murmur  GI: Soft. Non-tender. Non-distended.  : purewick draining yellow urine (did not assess)  Skin: No rash.  Neuro: right sided hemiparesis .  strength present in left hand and able to plantar and dorsiflex left foot. Sensation present to light touch on bilateral upper and lower distal extremities  Left hand tremor  Slowed responses to questions but answers appropriately    Medical Decision Making       45 MINUTES SPENT BY ME on the date of service doing chart review, history, exam, documentation & further activities per the note.  MANAGEMENT DISCUSSED with the following over the past 24 hours: RAMESH Mensah/CAMRON       Data         Imaging results reviewed  over the past 24 hrs:   No results found for this or any previous visit (from the past 24 hour(s)).

## 2023-11-21 NOTE — PROGRESS NOTES
Care Management Follow Up    Length of Stay (days): 45    Expected Discharge Date: 11/21/2023     Concerns to be Addressed: discharge planning     Patient plan of care discussed at interdisciplinary rounds: Yes    Anticipated Discharge Disposition: Long Term Care  [facility]  [address]  [place], [place] [address] [phone] (Alana RN)     Anticipated Discharge Services:        [facility]  [address]  [place], [place] [address] [phone]      Anticipated Discharge DME:  (TBD)  Patient/family educated on Medicare website which has current facility and service quality ratings: yes  Education Provided on the Discharge Plan: Yes  Patient/Family in Agreement with the Plan: yes    Referrals Placed by CM/SW:  (LongTerm Care)  Private pay costs discussed: Not applicable    Additional Information:  SW received a phone call from Edwina and Alana with [facility].  They had a few questions that they would like  to ask physical therapy and the nursing staff.  PT  1.  Is patient able to sit safely in a wheeled shower/commode chair.  (Would patient be floppy or slouching without assistance)   Nurse  1.  Does patient use call light if she needs assistance?  Per nurse: Patient usually does not use the call light they check on her regularly.  2 How does patient take her meds?  Patient takes her meds whole with applesauce  3.  Can patient sit in on the commode without slouching and/or tilting?  Per nurse: pt doesn't use the commode.   4.  Does patient have any open bedsores or wounds?  No wounds    Edwina and Alana are also calling Paul patient's  about patient's needs and hiring outside staff to assist [facility] staff.  Edwina has also reached out to [facility].  They have a bed opening at their TCU and she is trying to see if they can take her there.     talked with charge on 5C about pt. Charge she said that this patient is an easy  patient.  The main concern half-way is if they will have enough staff to assist Olga.Because she is an 2x assist and lift when getting up to the commode , in the wheelchair, and going to dinner.      SW met with Olga and her .  OT was in the room and we talked about how OT can help Olga while she is here.  OT is going to find a shower chair for her to practice using.  This shower chair can also be used as a commode. Per PT, a shower chair is more supportive than a commode that does not have a back.    Paul let  know that he did talk with Anderson Kelly this morning  ADDENDUM: Paul is going to call 3 agencies to talk to about  hiring extra help at [facility].       CAMRON to follow and assist with any other discharge needs that may arise.  DAYNA Peres   5A beds:5220-40  5C beds 5417-32 (no BMT pt's)     Phone: [phone]  Pager: [phone]

## 2023-11-22 ENCOUNTER — APPOINTMENT (OUTPATIENT)
Dept: OCCUPATIONAL THERAPY | Facility: CLINIC | Age: 78
DRG: 054 | End: 2023-11-22
Attending: STUDENT IN AN ORGANIZED HEALTH CARE EDUCATION/TRAINING PROGRAM
Payer: MEDICARE

## 2023-11-22 ENCOUNTER — APPOINTMENT (OUTPATIENT)
Dept: SPEECH THERAPY | Facility: CLINIC | Age: 78
DRG: 054 | End: 2023-11-22
Attending: STUDENT IN AN ORGANIZED HEALTH CARE EDUCATION/TRAINING PROGRAM
Payer: MEDICARE

## 2023-11-22 ENCOUNTER — APPOINTMENT (OUTPATIENT)
Dept: PHYSICAL THERAPY | Facility: CLINIC | Age: 78
DRG: 054 | End: 2023-11-22
Attending: STUDENT IN AN ORGANIZED HEALTH CARE EDUCATION/TRAINING PROGRAM
Payer: MEDICARE

## 2023-11-22 PROCEDURE — 97110 THERAPEUTIC EXERCISES: CPT | Mod: GO

## 2023-11-22 PROCEDURE — 99233 SBSQ HOSP IP/OBS HIGH 50: CPT | Performed by: INTERNAL MEDICINE

## 2023-11-22 PROCEDURE — 97110 THERAPEUTIC EXERCISES: CPT | Mod: GP

## 2023-11-22 PROCEDURE — 250N000013 HC RX MED GY IP 250 OP 250 PS 637: Performed by: INTERNAL MEDICINE

## 2023-11-22 PROCEDURE — 250N000012 HC RX MED GY IP 250 OP 636 PS 637: Performed by: STUDENT IN AN ORGANIZED HEALTH CARE EDUCATION/TRAINING PROGRAM

## 2023-11-22 PROCEDURE — 97530 THERAPEUTIC ACTIVITIES: CPT | Mod: GP

## 2023-11-22 PROCEDURE — 250N000013 HC RX MED GY IP 250 OP 250 PS 637: Performed by: STUDENT IN AN ORGANIZED HEALTH CARE EDUCATION/TRAINING PROGRAM

## 2023-11-22 PROCEDURE — 97530 THERAPEUTIC ACTIVITIES: CPT | Mod: GO

## 2023-11-22 PROCEDURE — 120N000005 HC R&B MS OVERFLOW UMMC

## 2023-11-22 PROCEDURE — 92507 TX SP LANG VOICE COMM INDIV: CPT | Mod: GN

## 2023-11-22 RX ADMIN — AMLODIPINE BESYLATE 5 MG: 5 TABLET ORAL at 08:54

## 2023-11-22 RX ADMIN — PANTOPRAZOLE SODIUM 40 MG: 40 TABLET, DELAYED RELEASE ORAL at 08:54

## 2023-11-22 RX ADMIN — ALBUTEROL SULFATE 2 PUFF: 90 AEROSOL, METERED RESPIRATORY (INHALATION) at 08:55

## 2023-11-22 RX ADMIN — PANTOPRAZOLE SODIUM 40 MG: 40 TABLET, DELAYED RELEASE ORAL at 16:29

## 2023-11-22 RX ADMIN — ALBUTEROL SULFATE 2 PUFF: 90 AEROSOL, METERED RESPIRATORY (INHALATION) at 18:47

## 2023-11-22 RX ADMIN — ALBUTEROL SULFATE 2 PUFF: 90 AEROSOL, METERED RESPIRATORY (INHALATION) at 14:23

## 2023-11-22 RX ADMIN — RIVAROXABAN 20 MG: 10 TABLET, FILM COATED ORAL at 16:29

## 2023-11-22 RX ADMIN — LEVETIRACETAM 1250 MG: 750 TABLET, FILM COATED ORAL at 18:47

## 2023-11-22 RX ADMIN — THERA TABS 1 TABLET: TAB at 08:54

## 2023-11-22 RX ADMIN — FLUOXETINE 40 MG: 20 CAPSULE ORAL at 08:54

## 2023-11-22 RX ADMIN — LACOSAMIDE 100 MG: 50 TABLET, FILM COATED ORAL at 18:47

## 2023-11-22 RX ADMIN — SULFAMETHOXAZOLE AND TRIMETHOPRIM 1 TABLET: 800; 160 TABLET ORAL at 08:54

## 2023-11-22 RX ADMIN — LACOSAMIDE 100 MG: 50 TABLET, FILM COATED ORAL at 08:54

## 2023-11-22 RX ADMIN — BUSPIRONE HYDROCHLORIDE 30 MG: 30 TABLET ORAL at 08:54

## 2023-11-22 RX ADMIN — BUSPIRONE HYDROCHLORIDE 30 MG: 30 TABLET ORAL at 18:47

## 2023-11-22 RX ADMIN — LEVETIRACETAM 1250 MG: 750 TABLET, FILM COATED ORAL at 08:54

## 2023-11-22 RX ADMIN — DEXAMETHASONE 2 MG: 2 TABLET ORAL at 08:54

## 2023-11-22 ASSESSMENT — ACTIVITIES OF DAILY LIVING (ADL)
ADLS_ACUITY_SCORE: 63

## 2023-11-22 NOTE — PROGRESS NOTES
CLINICAL NUTRITION SERVICES - BRIEF NOTE      Nutrition Prescription     RECOMMENDATIONS FOR MDs/PROVIDERS TO ORDER:  None at this time      Recommendations already ordered by Registered Dietitian (RD):  Continue Gelatein BID      Future/Additional Recommendations:  Continue to monitor appetite, oral intake and use of supplements    *Please see full assessment note from 11/16/23    New Findings:  Briefly checked in with Olga and Fahad. Olga was sleeping. He reported she continues to eat well and enjoys the supplement Gelatein.     Last weight was 71.1 kg     Interventions  RD will continue to follow   Fahad was wondering about specific micronutrients for Olga, RD will work on finding resources.     RD to follow per protocol.    Chaya Morejon RD, LD  5C/BMT pager: [phone]

## 2023-11-22 NOTE — PLAN OF CARE
5C Shift note 1900-0730    Neuro: A&Ox3. R sided weakness.  Cardiac: Afebrile. VSS.   Respiratory: Sating >93%  on RA.  GI/: Adequate urine output via purewick with no leaking. LBM 11/20  Diet/appetite: Tolerating regular diet. 1:1 feed. Eating well.  Activity:  Assist of 2 with lift, repositioned every 2 hours.  Pain: denies  Skin: No new deficits noted. Redness on perineal area - barrier paste applied with turns.  LDA's:  PIVx1 saline locked.    Plan: Awaiting LTC placement - see social work note for details. Continue with POC. Notify primary team with changes.

## 2023-11-22 NOTE — PROGRESS NOTES
Care Management Follow Up    Length of Stay (days): 46    Expected Discharge Date: 11/24/2023     Concerns to be Addressed: discharge planning     Patient plan of care discussed at interdisciplinary rounds: Yes    Anticipated Discharge Disposition: Long Term Care  Disposition Comments: n/a  Anticipated Discharge Services:  (TBD)  Anticipated Discharge DME:  (TBD)    Patient/family educated on Medicare website which has current facility and service quality ratings: yes  Education Provided on the Discharge Plan: Yes  Patient/Family in Agreement with the Plan: yes    Referrals Placed by CM/SW:  (LongTerm Care)  Private pay costs discussed: private room/amenity fees  [facility].  The person to send it to is:  Jailyn Walker  [phone]      Additional Information:  3:18 pm lAana with [facility] called  and asked  to send a TCU referral to [facility].  The person to send it to is:  Jailyn Walker  [phone]    SW to follow and assist with any other discharge needs that may arise.  DAYNA Peres   5A beds:5220-40  5C beds 5417-32 (no BMT pt's)     Phone: [phone]  Pager: [phone]

## 2023-11-23 PROCEDURE — 250N000013 HC RX MED GY IP 250 OP 250 PS 637: Performed by: INTERNAL MEDICINE

## 2023-11-23 PROCEDURE — 250N000013 HC RX MED GY IP 250 OP 250 PS 637: Performed by: STUDENT IN AN ORGANIZED HEALTH CARE EDUCATION/TRAINING PROGRAM

## 2023-11-23 PROCEDURE — 120N000005 HC R&B MS OVERFLOW UMMC

## 2023-11-23 PROCEDURE — 250N000012 HC RX MED GY IP 250 OP 636 PS 637: Performed by: STUDENT IN AN ORGANIZED HEALTH CARE EDUCATION/TRAINING PROGRAM

## 2023-11-23 PROCEDURE — 99233 SBSQ HOSP IP/OBS HIGH 50: CPT | Performed by: INTERNAL MEDICINE

## 2023-11-23 RX ADMIN — LEVETIRACETAM 1250 MG: 750 TABLET, FILM COATED ORAL at 09:22

## 2023-11-23 RX ADMIN — BUSPIRONE HYDROCHLORIDE 30 MG: 30 TABLET ORAL at 09:21

## 2023-11-23 RX ADMIN — DEXAMETHASONE 2 MG: 2 TABLET ORAL at 09:22

## 2023-11-23 RX ADMIN — THERA TABS 1 TABLET: TAB at 09:22

## 2023-11-23 RX ADMIN — ALBUTEROL SULFATE 2 PUFF: 90 AEROSOL, METERED RESPIRATORY (INHALATION) at 14:26

## 2023-11-23 RX ADMIN — FLUOXETINE 40 MG: 20 CAPSULE ORAL at 09:22

## 2023-11-23 RX ADMIN — PANTOPRAZOLE SODIUM 40 MG: 40 TABLET, DELAYED RELEASE ORAL at 09:23

## 2023-11-23 RX ADMIN — SULFAMETHOXAZOLE AND TRIMETHOPRIM 1 TABLET: 800; 160 TABLET ORAL at 09:23

## 2023-11-23 RX ADMIN — LACOSAMIDE 100 MG: 50 TABLET, FILM COATED ORAL at 20:04

## 2023-11-23 RX ADMIN — PANTOPRAZOLE SODIUM 40 MG: 40 TABLET, DELAYED RELEASE ORAL at 16:54

## 2023-11-23 RX ADMIN — ALBUTEROL SULFATE 2 PUFF: 90 AEROSOL, METERED RESPIRATORY (INHALATION) at 09:23

## 2023-11-23 RX ADMIN — LEVETIRACETAM 1250 MG: 750 TABLET, FILM COATED ORAL at 20:04

## 2023-11-23 RX ADMIN — RIVAROXABAN 20 MG: 10 TABLET, FILM COATED ORAL at 16:54

## 2023-11-23 RX ADMIN — BUSPIRONE HYDROCHLORIDE 30 MG: 30 TABLET ORAL at 20:04

## 2023-11-23 RX ADMIN — AMLODIPINE BESYLATE 5 MG: 5 TABLET ORAL at 09:23

## 2023-11-23 RX ADMIN — ALBUTEROL SULFATE 2 PUFF: 90 AEROSOL, METERED RESPIRATORY (INHALATION) at 02:51

## 2023-11-23 RX ADMIN — LACOSAMIDE 100 MG: 50 TABLET, FILM COATED ORAL at 09:23

## 2023-11-23 RX ADMIN — ALBUTEROL SULFATE 2 PUFF: 90 AEROSOL, METERED RESPIRATORY (INHALATION) at 20:08

## 2023-11-23 ASSESSMENT — ACTIVITIES OF DAILY LIVING (ADL)
ADLS_ACUITY_SCORE: 63

## 2023-11-23 NOTE — PLAN OF CARE
AVSS throughout shift on room air. Full assist, OOB with lift.  Pt denies pain or nausea.  No PRNs given.  Q2-3 hour turns overnight.  Purewick in place with adequate urine output.  Will continue to monitor.    Problem: Plan of Care - These are the overarching goals to be used throughout the patient stay.    Goal: Absence of Hospital-Acquired Illness or Injury  Intervention: Identify and Manage Fall Risk  Recent Flowsheet Documentation  Taken 11/23/2023 0000 by Oumou Berumen RN  Safety Promotion/Fall Prevention:   activity supervised   assistive device/personal items within reach   clutter free environment maintained   safety round/check completed  Taken 11/22/2023 2000 by Oumou Berumen RN  Safety Promotion/Fall Prevention:   activity supervised   assistive device/personal items within reach   clutter free environment maintained   safety round/check completed  Intervention: Prevent Skin Injury  Recent Flowsheet Documentation  Taken 11/23/2023 0252 by Oumou Berumen RN  Body Position:   left   turned  Taken 11/22/2023 2342 by Oumou Berumen RN  Body Position:   right   turned  Taken 11/22/2023 2100 by Oumou Berumen RN  Body Position:   left   turned  Intervention: Prevent and Manage VTE (Venous Thromboembolism) Risk  Recent Flowsheet Documentation  Taken 11/22/2023 2000 by Oumou Berumen RN  VTE Prevention/Management: SCDs (sequential compression devices) off  Intervention: Prevent Infection  Recent Flowsheet Documentation  Taken 11/23/2023 0000 by Oumou Berumen RN  Infection Prevention:   cohorting utilized   hand hygiene promoted   rest/sleep promoted  Taken 11/22/2023 2000 by Oumou Berumen RN  Infection Prevention:   cohorting utilized   hand hygiene promoted   rest/sleep promoted  Goal: Absence of Hospital-Acquired Illness or Injury  Intervention: Identify and Manage Fall Risk  Recent Flowsheet Documentation  Taken 11/23/2023 0000 by Oumou Berumen RN  Safety Promotion/Fall Prevention:    activity supervised   assistive device/personal items within reach   clutter free environment maintained   safety round/check completed  Taken 11/22/2023 2000 by Oumou Berumen RN  Safety Promotion/Fall Prevention:   activity supervised   assistive device/personal items within reach   clutter free environment maintained   safety round/check completed  Intervention: Prevent Skin Injury  Recent Flowsheet Documentation  Taken 11/23/2023 0252 by Oumou Berumen RN  Body Position:   left   turned  Taken 11/22/2023 2342 by Oumou Berumen RN  Body Position:   right   turned  Taken 11/22/2023 2100 by Oumou Berumen RN  Body Position:   left   turned  Intervention: Prevent and Manage VTE (Venous Thromboembolism) Risk  Recent Flowsheet Documentation  Taken 11/22/2023 2000 by Oumou Berumen RN  VTE Prevention/Management: SCDs (sequential compression devices) off  Intervention: Prevent Infection  Recent Flowsheet Documentation  Taken 11/23/2023 0000 by Oumou Berumen RN  Infection Prevention:   cohorting utilized   hand hygiene promoted   rest/sleep promoted  Taken 11/22/2023 2000 by Oumou Berumen RN  Infection Prevention:   cohorting utilized   hand hygiene promoted   rest/sleep promoted   Goal Outcome Evaluation:

## 2023-11-23 NOTE — PLAN OF CARE
"/76 (BP Location: Right arm)   Pulse 83   Temp 98.3  F (36.8  C) (Axillary)   Resp 16   Ht 1.702 m (5' 7\")   Wt 71.1 kg (156 lb 12 oz)   SpO2 94%   BMI 24.55 kg/m      HTN, OVSS on RA. Unable to assess orientation. Up with lift device. Denies pain and nausea. Waiting for placement for discharge. Continue with POC    Problem: Adult Inpatient Plan of Care  Goal: Plan of Care Review  Description: The Plan of Care Review/Shift note should be completed every shift.  The Outcome Evaluation is a brief statement about your assessment that the patient is improving, declining, or no change.  This information will be displayed automatically on your shift  note.  Outcome: Progressing  Goal: Patient-Specific Goal (Individualized)  Description: You can add care plan individualizations to a care plan. Examples of Individualization might be:  \"Parent requests to be called daily at 9am for status\", \"I have a hard time hearing out of my right ear\", or \"Do not touch me to wake me up as it startles  me\".  Outcome: Progressing  Goal: Absence of Hospital-Acquired Illness or Injury  Outcome: Progressing  Intervention: Identify and Manage Fall Risk  Recent Flowsheet Documentation  Taken 11/22/2023 1630 by Capo Mcguire, RN  Safety Promotion/Fall Prevention:   activity supervised   assistive device/personal items within reach   clutter free environment maintained   increased rounding and observation   nonskid shoes/slippers when out of bed   room organization consistent   safety round/check completed  Taken 11/22/2023 1130 by Capo Mcguire, RN  Safety Promotion/Fall Prevention:   activity supervised   assistive device/personal items within reach   clutter free environment maintained   increased rounding and observation   nonskid shoes/slippers when out of bed   room organization consistent   safety round/check completed  Taken 11/22/2023 0920 by Capo Mcguire, RN  Safety Promotion/Fall Prevention:   activity supervised   " assistive device/personal items within reach   clutter free environment maintained   nonskid shoes/slippers when out of bed   room organization consistent   safety round/check completed   increased rounding and observation  Taken 11/22/2023 0715 by Capo Mcguire, RN  Safety Promotion/Fall Prevention: safety round/check completed

## 2023-11-23 NOTE — PROGRESS NOTES
[facility]    Medicine Progress Note - Hospitalist Service, GOLD TEAM 10    Date of Admission:  10/6/2023    Assessment & Plan   Olga Bailey is a 78 year old female admitted on 10/6/2023. She has history of L frontoparietal glioblastoma s/p resection, chemotherapy and radiation with remission in 2021, recently found to have recurrence in September 2023, with baseline cognitive and functional impairment, h/o seizures, h/o DVT on xarelto, HTN, and depression who presented 10/6/23 with acute worsening of baseline aphasia as well as new facial twitching. Admitted to internal medicine for further work up and management. Neurology consulted and patient found to be having partial seizures for which lacosamide was added to keppra with improvement in seizure activity. Continued radiotherapy while on [facility] but has now transferred to [facility] for concomitant bevacizumab. Tolerated bevacizumab well however developed worsening headaches and tiredness at reduced dose of steroids so increased back up to 4 mg BID per rad onc. Completed 10 days of radiation on 10/25/23. Has received ongoing bevacizumab per treatment cycle, last cycle finished on 11/15 and further treatment delayed until outpatient follow up with Dr. Baker neuro oncolgoist.  Medically ready to discharge. Ongoing dispo planning, current planning for LTACH.    Interval Changes:  - completed last dose of steroid taper 11/23  - Ongoing work toward disposition  - continue PT/OT/ST til discharge (holding on therapy today given holiday)  - Patient reporting poor sleep overnight last night 2/2 issues falling asleep. Patient declined interest in medications for insomnia at this time, including melatonin.  and patient both report really trying to minimize unnecessary medication including prn medications.   - Ongoing discussion w/ Dr. Loomis if rec inpatient f/up MRI prior to discharge or if team would prefer  imaging outpatient    Discharge planning  Medically stable for discharge pending safe discharge plan  - 11/13 - primary MD had long discussion with pts  and CAMRON Miranda about discharge planning. Discussed that unfortunately TCU is not an option at this point and we need to move forward with LTC.   - 11/9 - discussed at weekly SWAT meeting due to difficult discharge.  - Recommended looking into options for increasing care at assisted living vs LTC. This was discussed with pt, daughter and  by SW on 11/9.  - RNCC/SW team working on safe discharge plan, greatly appreciate assistance. Looking at community TCU options and LTC, has been refused by  TCU multiple times.  - 11/23:  working to arrange additional care support at current living facility, additional referrals sent to LTACH as well  - PM&R consulted this admission to assist with evaluation for appropriate dispo and assess rehab potential, last evaluated on 11/13  [ ] Dispo: ensure PM&R f/up in place on discharge     Partial Seizure, controlled -no further seizure  Chronic aphasia   -Presented with acute (< 1 day) worsening of aphasia and new facial twitching with abnormal mouth movements concerning for partial seizure.  She underwent stroke eval in the ED which was reassuring against acute CVA  -Given recent recurrence of glioblastoma, there was concern for spread or advance of disease, though both CT and MRI were reassuring to stability of current malignancy from last imaging (9/2023).   -Neurology consulted and felt movements were consistent with partial seizure, lacosamide was added, though subsequent EEG showed some degree of persistent seizure activity, and further brief clinical seizures were observed.   -Neurology re-evaluated on 11/17 and 11/18 and EEG with no further epileptiform activity and seizures controlled with no further concern for break thru activity and adequate treatment levels of anti epileptics  Plan:  - Neurology  consulted, signed off 11/18  - Continue levetiracetam 1250 mg BID and Lacosamide 100 MG BID  - Steroid taper as below  - seizure precautions     H/o L frontoparietal glioblastoma s/p resection, chemotherapy and radiation (completed May 2021), with recurrence of malignancy 2023   Cognitive and functional impairment due to glioblastoma and chemoradiation   Brocas aphasia  Dementia  Seen by oncology on this visit, repeat imaging stable from September. Started on trial of steroid, eval symptom improvement.   -Previous neuro psych testing (4/2023) - demonstrate dementia  -SLP evaluation - 11/17 - severe brocas aphasia  - Oncology consulted, appreciate input - signed off, s/p bevacizumab 10/18, 11/1, 11/15 - bevacizumab now on hold until outpatient follow up per discussion with Dr. Baker (outpatient oncologist) on 11/16  - Rad/Onc consulted, RT started on 10/12 for 10 fractions - finished on 10/25  - Dexamethasone 4mg IV BID  (10/9 - 10/25) - headache resolved and no new neuro symptoms. Start tapering as of 10/26 - 2 mg BID x 2 weeks (10/26-11/9), 2 mg daily x 2 weeks (11/10-11/23), then stop (taper ordered)  - TMP-SMX daily for PCP PPX (pt had hx of PJP)   - PT/OT consulted, appreciate input  - Discussing w/ outpatient Rad-onc regarding f/up and possible MRI f/up imaging prior to discharge  [ ] dispo: Likely LTC on discharge, significant discussion with primary MD, PMR and PT/OT, Case management on 11/13. [facility] unable to accept patient and recommendation per specialties for LTC placement on discharge     Likely aspiration pneumonitis: resolved  - brianna albuterol MDI q6H while awake   - DuoNeb QID PRN   - SLP consult completed, appreciate input. Soft diet level 6 with thin liquids.     Depression - Continue PTA Buspirone and Fluoxetine   H/o DVT - Continue PTA rivaroxaban   HTN - Continue PTA Amlodipine   Hemorrhoids - topical preparation H          Diet: Snacks/Supplements Adult: Other; Send Berry Magic Cup with dinner  tray and allow pt/RN to order prn also (Also likes orange if in stock. NO van or ora); With Meals  Regular Diet Adult Thin Liquids (level 0)  Snacks/Supplements Adult: Other; please send Gelatein plus pineapple + whip topping @ 2:00 and cherry geltaien + whip topping @ 10:00; Between Meals    DVT Prophylaxis: DOAC  Martino Catheter: Not present  Lines: None     Cardiac Monitoring: None  Code Status: Full Code      Clinically Significant Risk Factors                  # Hypertension: Noted on problem list                   Disposition Plan      Expected Discharge Date: 11/24/2023    Discharge Delays: Placement - TCU  Destination: assisted living;nursing home  Discharge Comments: LTC - no seizures concerns per neuro. Still med ready          Olya Vences MD  Hospitalist Service, GOLD TEAM [address] [place] [address]  Securely message with [employer] (more info)  Text page via [facility] Paging/Directory   See signed in provider for up to date coverage information  ______________________________________________________________________    Interval History   No acute events overnight. Patient reports doing well today. Reports eating some turkey and mashed potatoes for lunch. Denies new pains or other symptoms. Does report trouble falling asleep yesterday evening. Seen early this morning, sleeping well after AM medication admin. Discussed trouble sleeping w/ patient, offered trial melatonin prn for sleep aid but patient declined interest. Will continue to monitor. Discussed conversation regarding sleep w/ ,  in agreement w/ minimizing unnecessary medications as able.     Physical Exam   Vital Signs: Temp: 98.2  F (36.8  C) Temp src: Axillary BP: (!) 144/54 Pulse: 74   Resp: 16 SpO2: 94 % O2 Device: None (Room air)    Weight: 156 lbs 11.95 oz    General Appearance: No acute distress, sitting up in bedside chair watching TV w/  at bedside  Mouth: lip smacking occasionally  noted  Respiratory: CTAB. No increased WOB. On RA  Cardiovascular: RRR. No murmur  GI: Soft. Non-tender. Non-distended.  : purewick draining yellow urine (did not assess)  Skin: No rash.  Neuro: right sided hemiparesis .  strength present in left hand and able to plantar and dorsiflex left foot. Sensation present to light touch on bilateral upper and lower distal extremities  Left hand tremor  Slowed responses to questions but answers appropriately    Medical Decision Making       50 MINUTES SPENT BY ME on the date of service doing chart review, history, exam, documentation & further activities per the note.      Data         Imaging results reviewed over the past 24 hrs:   No results found for this or any previous visit (from the past 24 hour(s)).

## 2023-11-24 ENCOUNTER — APPOINTMENT (OUTPATIENT)
Dept: OCCUPATIONAL THERAPY | Facility: CLINIC | Age: 78
DRG: 054 | End: 2023-11-24
Attending: STUDENT IN AN ORGANIZED HEALTH CARE EDUCATION/TRAINING PROGRAM
Payer: MEDICARE

## 2023-11-24 ENCOUNTER — APPOINTMENT (OUTPATIENT)
Dept: PHYSICAL THERAPY | Facility: CLINIC | Age: 78
DRG: 054 | End: 2023-11-24
Attending: STUDENT IN AN ORGANIZED HEALTH CARE EDUCATION/TRAINING PROGRAM
Payer: MEDICARE

## 2023-11-24 PROCEDURE — 99232 SBSQ HOSP IP/OBS MODERATE 35: CPT | Performed by: INTERNAL MEDICINE

## 2023-11-24 PROCEDURE — 250N000013 HC RX MED GY IP 250 OP 250 PS 637: Performed by: INTERNAL MEDICINE

## 2023-11-24 PROCEDURE — 97110 THERAPEUTIC EXERCISES: CPT | Mod: GO

## 2023-11-24 PROCEDURE — 97530 THERAPEUTIC ACTIVITIES: CPT | Mod: GO

## 2023-11-24 PROCEDURE — 250N000013 HC RX MED GY IP 250 OP 250 PS 637: Performed by: STUDENT IN AN ORGANIZED HEALTH CARE EDUCATION/TRAINING PROGRAM

## 2023-11-24 PROCEDURE — 97530 THERAPEUTIC ACTIVITIES: CPT | Mod: GP

## 2023-11-24 PROCEDURE — 97110 THERAPEUTIC EXERCISES: CPT | Mod: GP

## 2023-11-24 PROCEDURE — 120N000005 HC R&B MS OVERFLOW UMMC

## 2023-11-24 RX ADMIN — BUSPIRONE HYDROCHLORIDE 30 MG: 30 TABLET ORAL at 18:44

## 2023-11-24 RX ADMIN — ALBUTEROL SULFATE 2 PUFF: 90 AEROSOL, METERED RESPIRATORY (INHALATION) at 19:48

## 2023-11-24 RX ADMIN — AMLODIPINE BESYLATE 5 MG: 5 TABLET ORAL at 08:08

## 2023-11-24 RX ADMIN — ALBUTEROL SULFATE 2 PUFF: 90 AEROSOL, METERED RESPIRATORY (INHALATION) at 08:09

## 2023-11-24 RX ADMIN — LACOSAMIDE 100 MG: 50 TABLET, FILM COATED ORAL at 18:44

## 2023-11-24 RX ADMIN — BUSPIRONE HYDROCHLORIDE 30 MG: 30 TABLET ORAL at 08:08

## 2023-11-24 RX ADMIN — LEVETIRACETAM 1250 MG: 750 TABLET, FILM COATED ORAL at 08:07

## 2023-11-24 RX ADMIN — POLYETHYLENE GLYCOL 3350 17 G: 17 POWDER, FOR SOLUTION ORAL at 14:29

## 2023-11-24 RX ADMIN — FLUOXETINE 40 MG: 20 CAPSULE ORAL at 08:08

## 2023-11-24 RX ADMIN — THERA TABS 1 TABLET: TAB at 08:07

## 2023-11-24 RX ADMIN — PANTOPRAZOLE SODIUM 40 MG: 40 TABLET, DELAYED RELEASE ORAL at 08:08

## 2023-11-24 RX ADMIN — PANTOPRAZOLE SODIUM 40 MG: 40 TABLET, DELAYED RELEASE ORAL at 16:35

## 2023-11-24 RX ADMIN — RIVAROXABAN 20 MG: 10 TABLET, FILM COATED ORAL at 16:35

## 2023-11-24 RX ADMIN — LEVETIRACETAM 1250 MG: 750 TABLET, FILM COATED ORAL at 18:44

## 2023-11-24 RX ADMIN — SULFAMETHOXAZOLE AND TRIMETHOPRIM 1 TABLET: 800; 160 TABLET ORAL at 08:08

## 2023-11-24 RX ADMIN — LACOSAMIDE 100 MG: 50 TABLET, FILM COATED ORAL at 08:07

## 2023-11-24 ASSESSMENT — ACTIVITIES OF DAILY LIVING (ADL)
ADLS_ACUITY_SCORE: 63
ADLS_ACUITY_SCORE: 61
ADLS_ACUITY_SCORE: 63
ADLS_ACUITY_SCORE: 61
ADLS_ACUITY_SCORE: 63
ADLS_ACUITY_SCORE: 61
ADLS_ACUITY_SCORE: 63
ADLS_ACUITY_SCORE: 61

## 2023-11-24 NOTE — PROGRESS NOTES
Care Management Follow Up    Length of Stay (days): 48    Expected Discharge Date: 11/27/2023     Concerns to be Addressed: discharge planning     Patient plan of care discussed at interdisciplinary rounds: Yes    Anticipated Discharge Disposition: Long Term Care  [facility]  [address] [place] [address] [phone] (Alana RN)  Yvette Clinical ; [phone]     Anticipated Discharge Services:        [facility]  [address]  [place] [address] [phone]    Anticipated Discharge DME:  (TBD)    Patient/family educated on Medicare website which has current facility and service quality ratings: yes  Education Provided on the Discharge Plan: Yes  Patient/Family in Agreement with the Plan: yes    Referrals Placed by CM/SW:  (LongTerm Care)  Private pay costs discussed: private room/amenity fees  Declined 2x  [facility].  The person to send it to is:  Jailyn Walker  [phone]  11/24: CAMRON left message on Admissions V/M asking for a call back.   DECLINED: to high of an acuity for them at this time.     Additional Information:  CAMRON called [facility] TCU. Sw received a call back from admissions. They have reviewed and can't accept pt. They have to many high acuity pt's at this time    CAMRON l/m with Yvette, Clinical , letting her know [facility] decision and her to give SW a call back. [facility] be able to take Olga back with outside assistance?    ADDENDUM: Camron met with pt and her  and updated them on the decline form [facility]. Pt's  would like CAMRON to put in TCU referrals to all the [facility]CU. CAMRON will call [facility] and talk with them about the decline and more TCU referrals .     SW to follow and assist with any other discharge needs that may arise.  DAYNA Peres   5A beds:5220-40  5C beds 9917-32 (no BMT pt's)     Phone:  [phone]  Pager: [phone]

## 2023-11-24 NOTE — PLAN OF CARE
Goal Outcome Evaluation:  Care from 7 am to 7 pm  Alert; KENYON orientation. Flat affect; but pt answers to questions very short.B/P ran high this morning; within parameters; pt is on B/P med. Good appetite; total feed.  fed pt her lunch and dinner and CNA fed pt breakfast. Pt takes pills whole with apple sauce. Incontinent of urine; pure wick in place with adequate UOP. No BM today. Up to a recliner for few hrs.  in room all day; daughter came this afternoon to visit.  Awaiting placement.  Continue with POC

## 2023-11-24 NOTE — PLAN OF CARE
"BP (!) 146/82 (BP Location: Right arm)   Pulse 69   Temp 97.6  F (36.4  C) (Axillary)   Resp 16   Ht 1.702 m (5' 7\")   Wt 71.1 kg (156 lb 12 oz)   SpO2 94%   BMI 24.55 kg/m      No major changes overnight. Pt slept comfortably between cares. VSS on room air, afebrile. Denies pain and nausea. Turned and repositioned q2h. Purewick in place, no bm overnight. Foot-drop prevention boots on overnight per 's request. Waiting on LTC placement. Continue with current POC.    Goal Outcome Evaluation:    Problem: Plan of Care - These are the overarching goals to be used throughout the patient stay.    Goal: Absence of Hospital-Acquired Illness or Injury  Intervention: Identify and Manage Fall Risk  Recent Flowsheet Documentation  Taken 11/24/2023 0400 by Ruth Doyle RN  Safety Promotion/Fall Prevention: safety round/check completed  Taken 11/24/2023 0200 by Ruth Doyle RN  Safety Promotion/Fall Prevention: safety round/check completed  Taken 11/24/2023 0038 by Ruth Doyle RN  Safety Promotion/Fall Prevention: safety round/check completed  Taken 11/23/2023 2200 by Ruth Doyle RN  Safety Promotion/Fall Prevention: safety round/check completed  Taken 11/23/2023 2000 by Ruth Doyle RN  Safety Promotion/Fall Prevention: safety round/check completed  Intervention: Prevent Skin Injury  Recent Flowsheet Documentation  Taken 11/24/2023 0400 by Ruth Doyle RN  Body Position:   turned   left  Taken 11/24/2023 0200 by Ruth Doyle RN  Body Position:   turned   right  Taken 11/24/2023 0038 by Ruth Doyle RN  Body Position:   turned   left  Taken 11/23/2023 2200 by Ruth Doyle RN  Body Position:   turned   right  Taken 11/23/2023 2000 by Ruth Doyle RN  Body Position:   turned   left  Skin Protection:   adhesive use limited   incontinence pads utilized  Device Skin Pressure Protection:   absorbent pad utilized/changed   pressure points protected   tubing/devices free from skin " contact  Intervention: Prevent and Manage VTE (Venous Thromboembolism) Risk  Recent Flowsheet Documentation  Taken 11/23/2023 2000 by Ruth Doyle RN  VTE Prevention/Management: SCDs (sequential compression devices) off  Intervention: Prevent Infection  Recent Flowsheet Documentation  Taken 11/23/2023 2000 by Ruth Doyle RN  Infection Prevention:   cohorting utilized   hand hygiene promoted   rest/sleep promoted  Goal: Absence of Hospital-Acquired Illness or Injury  Intervention: Identify and Manage Fall Risk  Recent Flowsheet Documentation  Taken 11/24/2023 0400 by Ruth Doyle RN  Safety Promotion/Fall Prevention: safety round/check completed  Taken 11/24/2023 0200 by Ruth Doyle RN  Safety Promotion/Fall Prevention: safety round/check completed  Taken 11/24/2023 0038 by Ruth Doyle RN  Safety Promotion/Fall Prevention: safety round/check completed  Taken 11/23/2023 2200 by Ruth Doyle RN  Safety Promotion/Fall Prevention: safety round/check completed  Taken 11/23/2023 2000 by Ruth Doyle RN  Safety Promotion/Fall Prevention: safety round/check completed  Intervention: Prevent Skin Injury  Recent Flowsheet Documentation  Taken 11/24/2023 0400 by Ruth Doyle RN  Body Position:   turned   left  Taken 11/24/2023 0200 by Ruth Doyle RN  Body Position:   turned   right  Taken 11/24/2023 0038 by Ruth Doyle RN  Body Position:   turned   left  Taken 11/23/2023 2200 by Ruth Doyle RN  Body Position:   turned   right  Taken 11/23/2023 2000 by Ruth Doyle RN  Body Position:   turned   left  Skin Protection:   adhesive use limited   incontinence pads utilized  Device Skin Pressure Protection:   absorbent pad utilized/changed   pressure points protected   tubing/devices free from skin contact  Intervention: Prevent and Manage VTE (Venous Thromboembolism) Risk  Recent Flowsheet Documentation  Taken 11/23/2023 2000 by Ruth Doyle RN  VTE Prevention/Management: SCDs (sequential  compression devices) off  Intervention: Prevent Infection  Recent Flowsheet Documentation  Taken 11/23/2023 2000 by Ruth Doyle RN  Infection Prevention:   cohorting utilized   hand hygiene promoted   rest/sleep promoted     Problem: Adult Inpatient Plan of Care  Goal: Absence of Hospital-Acquired Illness or Injury  Outcome: Progressing  Intervention: Identify and Manage Fall Risk  Recent Flowsheet Documentation  Taken 11/24/2023 0400 by Ruth Doyle RN  Safety Promotion/Fall Prevention: safety round/check completed  Taken 11/24/2023 0200 by Ruth Doyle RN  Safety Promotion/Fall Prevention: safety round/check completed  Taken 11/24/2023 0038 by Ruth Doyle RN  Safety Promotion/Fall Prevention: safety round/check completed  Taken 11/23/2023 2200 by Ruth Doyle RN  Safety Promotion/Fall Prevention: safety round/check completed  Taken 11/23/2023 2000 by Ruth Doyle RN  Safety Promotion/Fall Prevention: safety round/check completed  Intervention: Prevent Skin Injury  Recent Flowsheet Documentation  Taken 11/24/2023 0400 by Ruth Doyle RN  Body Position:   turned   left  Taken 11/24/2023 0200 by Ruth Doyle RN  Body Position:   turned   right  Taken 11/24/2023 0038 by Ruth Doyle RN  Body Position:   turned   left  Taken 11/23/2023 2200 by Ruth Doyle RN  Body Position:   turned   right  Taken 11/23/2023 2000 by Ruth Doyle RN  Body Position:   turned   left  Skin Protection:   adhesive use limited   incontinence pads utilized  Device Skin Pressure Protection:   absorbent pad utilized/changed   pressure points protected   tubing/devices free from skin contact  Intervention: Prevent and Manage VTE (Venous Thromboembolism) Risk  Recent Flowsheet Documentation  Taken 11/23/2023 2000 by Ruth Doyle RN  VTE Prevention/Management: SCDs (sequential compression devices) off  Intervention: Prevent Infection  Recent Flowsheet Documentation  Taken 11/23/2023 2000 by Ruth Doyle  RN  Infection Prevention:   cohorting utilized   hand hygiene promoted   rest/sleep promoted     Problem: Seizure Disorder Comorbidity  Goal: Maintenance of Seizure Control  Intervention: Maintain Seizure-Symptom Control  Recent Flowsheet Documentation  Taken 11/23/2023 2000 by Ruth Doyle RN  Seizure Precautions: clutter-free environment maintained     Problem: Hypertension Comorbidity  Goal: Blood Pressure in Desired Range  Intervention: Maintain Blood Pressure Management  Recent Flowsheet Documentation  Taken 11/23/2023 2000 by Ruth Doyle RN  Medication Review/Management: medications reviewed     Problem: Thought Process Alteration  Goal: Optimal Thought Clarity  Intervention: Minimize Safety Risk and Altered Thought  Recent Flowsheet Documentation  Taken 11/23/2023 2000 by Ruth Doyle RN  Sensory Stimulation Regulation: care clustered  Enhanced Safety Measures: room near unit station     Problem: Swallowing Impairment  Goal: Optimal Eating/Swallowing without Aspiration  Intervention: Optimize Eating and Swallowing  Recent Flowsheet Documentation  Taken 11/23/2023 2000 by Ruth Doyle RN  Aspiration Precautions: awake/alert before oral intake     Problem: Communication Impairment  Goal: Effective Communication Skills  Intervention: Optimize Communication Skills  Recent Flowsheet Documentation  Taken 11/23/2023 2000 by Ruth Doyle RN  Communication Enhancement Strategies:   call light answered in person   family involved in communication plan   family/caregiver assisted with communication

## 2023-11-24 NOTE — PLAN OF CARE
"Highlights/changes today:   Worked with OT today.   Got up to chairs with lift assist.   Last BM documented 11/21, Miralax given x 1 at 1430.     BP (!) 155/79 (BP Location: Right arm)   Pulse 69   Temp 97.7  F (36.5  C) (Axillary)   Resp 18   Ht 1.702 m (5' 7\")   Wt 71.1 kg (156 lb 12 oz)   SpO2 93%   BMI 24.55 kg/m      Neuro: Oriented to self, answers with little to few words, mainly yes or no answers. Calm and cooperative.  is at bedside, super helpful in care and how to position pt.   Respiratory: Sating in the high 90's on RA.  GI/: Purewick use with adequate urine output and no BM during shift, last BM 11/21 Miralax given x 1.   Diet/appetite: Tolerating regular diet, fair appetite.  Activity:  Assist x 2 for turning and repositioning, lift assist to get pt out of bed. Pt currently in chair and worked with OT this afternoon.   Pain: Denies   Lab/lytes: Not on any electrolyte protocol.   Skin: No new deficits noted.  LDA's: L PIV, SL.     Plan: Long term care placement, maybe this weekend for possible placement. Continue with POC. Notify primary team with changes.    "

## 2023-11-24 NOTE — PROGRESS NOTES
[facility]    Medicine Progress Note - Hospitalist Service, GOLD TEAM 10    Date of Admission:  10/6/2023    Assessment & Plan   Olga Bailey is a 78 year old female admitted on 10/6/2023. She has history of L frontoparietal glioblastoma s/p resection, chemotherapy and radiation with remission in 2021, recently found to have recurrence in September 2023, with baseline cognitive and functional impairment, h/o seizures, h/o DVT on xarelto, HTN, and depression who presented 10/6/23 with acute worsening of baseline aphasia as well as new facial twitching. Admitted to internal medicine for further work up and management. Neurology consulted and patient found to be having partial seizures for which lacosamide was added to keppra with improvement in seizure activity. Continued radiotherapy while on [facility] but has now transferred to [place] for concomitant bevacizumab. Tolerated bevacizumab well however developed worsening headaches and tiredness at reduced dose of steroids so increased back up to 4 mg BID per rad onc. Completed 10 days of radiation on 10/25/23. Has received ongoing bevacizumab per treatment cycle, last cycle finished on 11/15 and further treatment delayed until outpatient follow up with Dr. Baker neuro oncolgoist.  Medically ready to discharge. Ongoing dispo planning, current planning for LTACH.    Interval Changes:  - Ongoing work toward disposition  - continue PT/OT/ST til discharge (holding on therapy today given holiday)  -  not at bedside this morning  - Ongoing discussion w/ Dr. Loomis if rec inpatient f/up MRI prior to discharge or if team would prefer imaging outpatient    Discharge planning  Medically stable for discharge pending safe discharge plan  - 11/13 - primary MD had long discussion with pts  and CAMRON Miranda about discharge planning. Discussed that unfortunately TCU is not an option at this point and we need to move forward  with LTC.   - 11/9 - discussed at weekly SWAT meeting due to difficult discharge.  - Recommended looking into options for increasing care at assisted living vs LTC. This was discussed with pt, daughter and  by SW on 11/9.  - RNCC/SW team working on safe discharge plan, greatly appreciate assistance. Looking at community TCU options and LTC, has been refused by  TCU multiple times.  - 11/23:  working to arrange additional care support at current living facility, additional referrals sent to LTACH as well  - PM&R consulted this admission to assist with evaluation for appropriate dispo and assess rehab potential, last evaluated on 11/13  [ ] Dispo: ensure PM&R f/up in place on discharge     Partial Seizure, controlled -no further seizure  Chronic aphasia   -Presented with acute (< 1 day) worsening of aphasia and new facial twitching with abnormal mouth movements concerning for partial seizure.  She underwent stroke eval in the ED which was reassuring against acute CVA  -Given recent recurrence of glioblastoma, there was concern for spread or advance of disease, though both CT and MRI were reassuring to stability of current malignancy from last imaging (9/2023).   -Neurology consulted and felt movements were consistent with partial seizure, lacosamide was added, though subsequent EEG showed some degree of persistent seizure activity, and further brief clinical seizures were observed.   -Neurology re-evaluated on 11/17 and 11/18 and EEG with no further epileptiform activity and seizures controlled with no further concern for break thru activity and adequate treatment levels of anti epileptics  Plan:  - Neurology consulted, signed off 11/18  - Continue levetiracetam 1250 mg BID and Lacosamide 100 MG BID  - s/p steroid taper  - seizure precautions     H/o L frontoparietal glioblastoma s/p resection, chemotherapy and radiation (completed May 2021), with recurrence of malignancy 2023   Cognitive and  functional impairment due to glioblastoma and chemoradiation   Brocas aphasia  Dementia  Seen by oncology on this visit, repeat imaging stable from September. Started on trial of steroid, eval symptom improvement.   -Previous neuro psych testing (4/2023) - demonstrate dementia  -SLP evaluation - 11/17 - severe brocas aphasia  - Oncology consulted, appreciate input - signed off, s/p bevacizumab 10/18, 11/1, 11/15 - bevacizumab now on hold until outpatient follow up per discussion with Dr. Baker (outpatient oncologist)   - Rad/Onc consulted, RT started on 10/12 for 10 fractions - finished on 10/25  - Dexamethasone 4mg IV BID  (10/9 - 10/25) - headache resolved and no new neuro symptoms. Start tapering as of 10/26 - 2 mg BID x 2 weeks (10/26-11/9), 2 mg daily x 2 weeks (11/10-11/23), then stop (taper ordered)  - TMP-SMX daily for PCP PPX (pt had hx of PJP)   - PT/OT consulted, appreciate input  - Discussing w/ outpatient Rad-onc regarding f/up and possible MRI f/up imaging prior to discharge  [ ] dispo: Likely LTC on discharge, significant discussion with primary MD, PMR and PT/OT, Case management on 11/13. [facility] unable to accept patient and recommendation per specialties for LTC placement on discharge     Likely aspiration pneumonitis: resolved  - brianna albuterol MDI q6H while awake   - DuoNeb QID PRN   - SLP consult completed, appreciate input. Soft diet level 6 with thin liquids.     Depression - Continue PTA Buspirone and Fluoxetine   H/o DVT - Continue PTA rivaroxaban   HTN - Continue PTA Amlodipine   Hemorrhoids - topical preparation H          Diet: Snacks/Supplements Adult: Other; Send Berry Magic Cup with dinner tray and allow pt/RN to order prn also (Also likes orange if in stock. NO van or ora); With Meals  Regular Diet Adult Thin Liquids (level 0)  Snacks/Supplements Adult: Other; please send Gelatein plus pineapple + whip topping @ 2:00 and cherry geltaien + whip topping @ 10:00; Between Meals    DVT  Prophylaxis: DOAC  Martino Catheter: Not present  Lines: None     Cardiac Monitoring: None  Code Status: Full Code      Clinically Significant Risk Factors                  # Hypertension: Noted on problem list                   Disposition Plan     Expected Discharge Date: 11/24/2023    Discharge Delays: Placement - TCU  Destination: assisted living;nursing home  Discharge Comments: LTC - no seizures concerns per neuro. Still med ready          Olya Vences MD  Hospitalist Service, GOLD TEAM 10  [facility]  Securely message with [employer] (more info)  Text page via [employer] Paging/Directory   See signed in provider for up to date coverage information  ______________________________________________________________________    Interval History   No acute events overnight. Patient reports doing well today. Just finished breakfast, eggs w/ cheese and some oatmeal w/ RN assistance. Denies new pains or other symptoms. Unable to answer how she slept last night/any trouble sleeping last night.  not at bedside. Will follow up w/ Dr. Loomis regarding MRI inpatient.     Physical Exam   Vital Signs: Temp: 97.6  F (36.4  C) Temp src: Axillary BP: (!) 146/82 Pulse: 69   Resp: 16 SpO2: 94 % O2 Device: None (Room air)    Weight: 156 lbs 11.95 oz    General Appearance: No acute distress, sitting up in bed after just finishing breakfast  Mouth: lip smacking occasionally noted  Respiratory: CTAB. No increased WOB. On RA  Cardiovascular: RRR. No murmur  GI: Soft. Non-tender. Non-distended.  : purewick draining yellow urine (did not assess)  Skin: No rash.  Neuro: right sided hemiparesis .  strength present in left hand and able to plantar and dorsiflex left foot. Sensation present to light touch on bilateral upper and lower distal extremities  Left hand tremor  Slowed responses to questions but answers appropriately    Medical Decision Making       40 MINUTES SPENT BY ME on the date of  service doing chart review, history, exam, documentation & further activities per the note.      Data         Imaging results reviewed over the past 24 hrs:   No results found for this or any previous visit (from the past 24 hour(s)).

## 2023-11-24 NOTE — PLAN OF CARE
"Care from: 1500 - 1930    Pt is A&Ox1 intermittently - KENYON d/t aphasia, yes/no questions only. VSS. Ax2 lift assist, Q2 hr reposition - Side lying left as of 1900. Sats >90% on RA. LS clear/diminished. Strict I/Os with adequate urine output with purewick and 1 BM this shift. Incontinent/total care/assist. Tolerates diet, eating fair, encourage fluid intake - total feed. Denied pain this shift. Sacrum/buttocks red/blanchable - barrier paste applied, perineum cleansed. L PIV - SL    /83 (BP Location: Right arm, Cuff Size: Adult Regular)   Pulse 74   Temp 98.4  F (36.9  C) (Axillary)   Resp 18   Ht 1.702 m (5' 7\")   Wt 71.1 kg (156 lb 12 oz)   SpO2 94%   BMI 24.55 kg/m       Plan: Continue to monitor Pt status and report changes to Gold 10 treatment team. Pending LTAC placement.  at bedside attentive to pt, active in hospital cares with staff.    "

## 2023-11-25 PROCEDURE — 250N000013 HC RX MED GY IP 250 OP 250 PS 637: Performed by: STUDENT IN AN ORGANIZED HEALTH CARE EDUCATION/TRAINING PROGRAM

## 2023-11-25 PROCEDURE — 120N000005 HC R&B MS OVERFLOW UMMC

## 2023-11-25 PROCEDURE — 250N000013 HC RX MED GY IP 250 OP 250 PS 637: Performed by: INTERNAL MEDICINE

## 2023-11-25 PROCEDURE — 99232 SBSQ HOSP IP/OBS MODERATE 35: CPT | Performed by: INTERNAL MEDICINE

## 2023-11-25 RX ADMIN — LACOSAMIDE 100 MG: 50 TABLET, FILM COATED ORAL at 07:35

## 2023-11-25 RX ADMIN — THERA TABS 1 TABLET: TAB at 07:35

## 2023-11-25 RX ADMIN — LEVETIRACETAM 1250 MG: 750 TABLET, FILM COATED ORAL at 07:35

## 2023-11-25 RX ADMIN — BUSPIRONE HYDROCHLORIDE 30 MG: 30 TABLET ORAL at 07:35

## 2023-11-25 RX ADMIN — ALBUTEROL SULFATE 2 PUFF: 90 AEROSOL, METERED RESPIRATORY (INHALATION) at 20:21

## 2023-11-25 RX ADMIN — AMLODIPINE BESYLATE 5 MG: 5 TABLET ORAL at 07:36

## 2023-11-25 RX ADMIN — FLUOXETINE 40 MG: 20 CAPSULE ORAL at 07:35

## 2023-11-25 RX ADMIN — ALBUTEROL SULFATE 2 PUFF: 90 AEROSOL, METERED RESPIRATORY (INHALATION) at 07:36

## 2023-11-25 RX ADMIN — BUSPIRONE HYDROCHLORIDE 30 MG: 30 TABLET ORAL at 18:40

## 2023-11-25 RX ADMIN — PANTOPRAZOLE SODIUM 40 MG: 40 TABLET, DELAYED RELEASE ORAL at 17:28

## 2023-11-25 RX ADMIN — LEVETIRACETAM 1250 MG: 750 TABLET, FILM COATED ORAL at 18:40

## 2023-11-25 RX ADMIN — LACOSAMIDE 100 MG: 50 TABLET, FILM COATED ORAL at 18:40

## 2023-11-25 RX ADMIN — RIVAROXABAN 20 MG: 10 TABLET, FILM COATED ORAL at 17:28

## 2023-11-25 RX ADMIN — ALBUTEROL SULFATE 2 PUFF: 90 AEROSOL, METERED RESPIRATORY (INHALATION) at 14:41

## 2023-11-25 RX ADMIN — PANTOPRAZOLE SODIUM 40 MG: 40 TABLET, DELAYED RELEASE ORAL at 07:35

## 2023-11-25 RX ADMIN — SULFAMETHOXAZOLE AND TRIMETHOPRIM 1 TABLET: 800; 160 TABLET ORAL at 07:35

## 2023-11-25 ASSESSMENT — ACTIVITIES OF DAILY LIVING (ADL)
ADLS_ACUITY_SCORE: 65
ADLS_ACUITY_SCORE: 63
ADLS_ACUITY_SCORE: 61
ADLS_ACUITY_SCORE: 63
ADLS_ACUITY_SCORE: 63
ADLS_ACUITY_SCORE: 61
ADLS_ACUITY_SCORE: 63
ADLS_ACUITY_SCORE: 61
ADLS_ACUITY_SCORE: 61
ADLS_ACUITY_SCORE: 63

## 2023-11-25 NOTE — PROGRESS NOTES
Radiotherapy Treatment Summary          Date of Report: 2023    PATIENT: OLGA RYAN  MEDICAL RECORD NO: [id number]  : 1945    DIAGNOSIS: C71.1 Malignant neoplasm of frontal lobe  INTENT OF RADIOTHERAPY: Cure  PATHOLOGY:  Glioblastoma [IDH-wildtype, MGMT promoter methylation positive]                                 STAGE: WHO Grade IV  CONCURRENT SYSTEMIC THERAPY:  Yes, Bevacizumab                 Details of the treatments summarized below are found in records kept in the Department of Radiation Oncology at [facility].    Treatment Summary:  Radiation Oncology - Course: 2 Protocol:   Treatment Site Current Dose Modality From To Elapsed Days Fx.  [id number]  3,500 cGy 06 X 10/12/2023 10/25/2023  13 10          Dose per Fraction:  350 cGy      Total Dose:    3500 cGy          COMMENTS: Olga Ryan is a 78 year old woman with a IDH-wildtype, MGMT promoter methylated, glioblastoma of the left fronto-parietal region status post surgical resection on 2021 and adjuvant hypofractionated radiotherapy from 21-21, followed by adjuvant Temozolomide, who developed evidence of recurrence along the resection cavity in 2023. She was admitted on 10/6/23 after noted aphasia and behavioral changes. MR Brain on 10/6/23 continued to demonstrate this nodular enhancing foci along the resection cavity that was noted to be stable from the previous month. She was recommended to proceed with re-irradiation as above. She was started on Bevacizumab per the recommendation of Neuro-Oncology. She tolerated therapy, and remained inpatient during the course of her treatment. Steroid taper instructions were provided to the patient and inpatient medicine team.                       ED visits/hospitalizations: Patient was hospitalized during the course of therapy    Missed treatments: None    Acute Toxicity Profile by CTC v5.0: None      PAIN MANAGEMENT: None                          FOLLOW UP PLAN:       We will see Olga in follow up, after her next MRI with Dr. Baker, in about 4 weeks                      Resident Physician:  Romina Humphrey MD   Staff Physician: Betsy Spann M.D. PhD    CC: Stephanie Baker MD

## 2023-11-25 NOTE — PROGRESS NOTES
Brief Hematology-Oncology Note    78-year jack woman with recurrent GBM s/p resection and adjuvant chemoradiation, admitted with focal seizures before outpatient plan for concurrent radiation + bevacizumab could be started.     She ultimately received radiotherapy and has been placed on a steroid taper. TMP-SMX was started for PJP prophylaxis.    Primary team paged the H/O service requesting recs on discontinuation of TMP-SMX now that steroid taper is complete.    It is reasonable to discontinue PJP prophylaxis given the steroid taper is now complete, particularly as it appears she is on RA and without respiratory symptoms. An ID workup can be considered if this changes.    Discussed with Nury Vences and Antonella.    Curt Hdz  PGY4  Hematology, Oncology, and Transplantation  [phone]

## 2023-11-25 NOTE — PLAN OF CARE
Oriented to self. Hypertensive to 149/79 this morning, recheck 130/67 post antihypertensives. OVSS on RA. Denies pain. Denies n/v. Large BM today. Voids adequately, purewick in use. Up to chair for meals. Good appetite. Repo Q2H or more frequently (see flowsheets for specific times). Blanchable redness to coccyx. Barrier cream applied, reduced redness by end of shift. Bed/chair alarm on for safety. Soft touch call light.  in room much of shift, attentive to pt and helping with cares.

## 2023-11-25 NOTE — PROGRESS NOTES
[facility]    Medicine Progress Note - Hospitalist Service, GOLD TEAM 10    Date of Admission:  10/6/2023    Assessment & Plan   Olga Bailey is a 78 year old female admitted on 10/6/2023. She has history of L frontoparietal glioblastoma s/p resection, chemotherapy and radiation with remission in 2021, recently found to have recurrence in September 2023, with baseline cognitive and functional impairment, h/o seizures, h/o DVT on xarelto, HTN, and depression who presented 10/6/23 with acute worsening of baseline aphasia as well as new facial twitching. Admitted to internal medicine for further work up and management. Neurology consulted and patient found to be having partial seizures for which lacosamide was added to keppra with improvement in seizure activity. [place] [facility] [place] now [place] to [place] for concomitant bevacizumab. Tolerated bevacizumab well however developed worsening headaches and tiredness at reduced dose of steroids so increased back up to 4 mg BID per rad onc. Completed 10 days of radiation on 10/25/23. Has received ongoing bevacizumab per treatment cycle, last cycle finished on 11/15 and further treatment delayed until outpatient follow up with Dr. Baker neuro oncolgoist.  Medically ready to discharge. Ongoing dispo planning, current planning for LTACH.    Interval Changes:  - Ongoing work toward disposition, new referral to another LTACH to be placed on Monday per CM  - continue PT/OT/ST til discharge (holding on therapy today given holiday)  -  not at bedside this morning  - discussing w/ oncology need for continued PJP Bactrim ppx after completion of steroid dose  - Ongoing discussion w/ Dr. Loomis if rec inpatient f/up MRI prior to discharge or if team would prefer imaging outpatient    Discharge planning  Medically stable for discharge pending safe discharge plan  - 11/13 - primary MD had long  discussion with pts  and CAMRON Miranda about discharge planning. Discussed that unfortunately TCU is not an option at this point and we need to move forward with LTC.   - 11/9 - discussed at weekly SWAT meeting due to difficult discharge.  - Recommended looking into options for increasing care at assisted living vs LTC. This was discussed with pt, daughter and  by SW on 11/9.  - RNCC/SW team working on safe discharge plan, greatly appreciate assistance. Looking at community TCU options and LTC, has been refused by  TCU multiple times.  - 11/23:  working to arrange additional care support at current living facility, additional referrals sent to LTACH as well  - PM&R consulted this admission to assist with evaluation for appropriate dispo and assess rehab potential, last evaluated on 11/13  [ ] Dispo: Plan for [facility] w/ additional support arrangements by  or LTACH. Will ensure PM&R f/up in place on discharge     Partial Seizure, controlled -no further seizure  Chronic aphasia   -Presented with acute (< 1 day) worsening of aphasia and new facial twitching with abnormal mouth movements concerning for partial seizure.  She underwent stroke eval in the ED which was reassuring against acute CVA  -Given recent recurrence of glioblastoma, there was concern for spread or advance of disease, though both CT and MRI were reassuring to stability of current malignancy from last imaging (9/2023).   -Neurology consulted and felt movements were consistent with partial seizure, lacosamide was added, though subsequent EEG showed some degree of persistent seizure activity, and further brief clinical seizures were observed.   -Neurology re-evaluated on 11/17 and 11/18 and EEG with no further epileptiform activity and seizures controlled with no further concern for break thru activity and adequate treatment levels of anti epileptics  Plan:  - Neurology consulted, signed off 11/18  - Continue levetiracetam 1250 mg  BID and Lacosamide 100 MG BID  - s/p steroid taper  - seizure precautions     H/o L frontoparietal glioblastoma s/p resection, chemotherapy and radiation (completed May 2021), with recurrence of malignancy 2023   Cognitive and functional impairment due to glioblastoma and chemoradiation   Brocas aphasia  Dementia  Seen by oncology on this visit, repeat imaging stable from September. Started on trial of steroid, eval symptom improvement.   -Previous neuro psych testing (4/2023) - demonstrate dementia  -SLP evaluation - 11/17 - severe brocas aphasia  - Oncology consulted, appreciate input - signed off, s/p bevacizumab 10/18, 11/1, 11/15 - bevacizumab now on hold until outpatient follow up per discussion with Dr. Baker (outpatient oncologist)   - Rad/Onc consulted, RT started on 10/12 for 10 fractions - finished on 10/25  - Dexamethasone 4mg IV BID  (10/9 - 10/25) - headache resolved and no new neuro symptoms. Start tapering as of 10/26 - 2 mg BID x 2 weeks (10/26-11/9), 2 mg daily x 2 weeks (11/10-11/23), then stop (taper ordered)  - TMP-SMX daily for PCP PPX (pt had hx of PJP), discussing need for continuation w/ oncology  - PT/OT consulted, appreciate input  - Discussing w/ outpatient Rad-onc regarding f/up and possible MRI f/up imaging prior to discharge  [ ] dispo: Likely LTC on discharge, significant discussion with primary MD, PMR and PT/OT, Case management on 11/13. [facility] unable to accept patient and recommendation per specialties for LTC placement on discharge     Likely aspiration pneumonitis: resolved  - brianna albuterol MDI q6H while awake   - DuoNeb QID PRN   - SLP consult completed, appreciate input. Soft diet level 6 with thin liquids.     Depression - Continue PTA Buspirone and Fluoxetine   H/o DVT - Continue PTA rivaroxaban   HTN - Continue PTA Amlodipine   Hemorrhoids - topical preparation H          Diet: Snacks/Supplements Adult: Other; Send Berry Magic Cup with dinner tray and allow pt/RN to order prn  also (Also likes orange if in stock. NO van or ora); With Meals  Regular Diet Adult Thin Liquids (level 0)  Snacks/Supplements Adult: Other; please send Gelatein plus pineapple + whip topping @ 2:00 and cherry geltaien + whip topping @ 10:00; Between Meals    DVT Prophylaxis: DOAC  Martino Catheter: Not present  Lines: None     Cardiac Monitoring: None  Code Status: Full Code      Disposition Plan     Expected Discharge Date: 11/27/2023    Discharge Delays: Placement - TCU  Destination: assisted living;nursing home  Discharge Comments: LTC - no seizures concerns per neuro. Still med ready          Olya Vences MD  Hospitalist Service, GOLD TEAM 10  [facility]  Securely message with [employer] (more info)  Text page via [employer] Paging/Directory   See signed in provider for up to date coverage information  ______________________________________________________________________    Interval History   No acute events overnight. Patient reports doing well today. Aid at bedside feeding patient breakfast, just finished her omelet (ate all of it), currently eating oatmeal. Patient unable to answer most questions this morning (despite phrasing all questions w/ yes/no). Denies new pains or other symptoms/complaints.   not at bedside. Will follow up w/ Dr. Loomis regarding MRI inpatient. Will discuss w/ oncology team if they feel the bactrim ppx should be continued or not given ending of steroid course.     Physical Exam   Vital Signs: Temp: 97.4  F (36.3  C) Temp src: Axillary BP: 130/67 Pulse: 89   Resp: 18 SpO2: 94 % O2 Device: None (Room air)    Weight: 163 lbs 9.3 oz    General Appearance: No acute distress, sitting up in bed eating breakfast  Mouth: lip smacking occasionally noted  Respiratory: CTAB. No increased WOB. On RA  Cardiovascular: RRR. No murmur  GI: Soft. Non-tender. Non-distended.  : purewick draining yellow urine (did not assess)  Skin: No rash.   Neuro: right  sided hemiparesis .  strength present in left hand and able to plantar and dorsiflex left foot. Sensation present to light touch on bilateral upper and lower distal extremities. Left hand tremor  Very slowed responses to questions w/ occasionally unable to answer questions and occasionally answers questions incorrectly (per )    Medical Decision Making       40 MINUTES SPENT BY ME on the date of service doing chart review, history, exam, documentation & further activities per the note.      Data         Imaging results reviewed over the past 24 hrs:   No results found for this or any previous visit (from the past 24 hour(s)).

## 2023-11-25 NOTE — PLAN OF CARE
"BP (!) 141/87 (BP Location: Right arm)   Pulse 71   Temp 98.5  F (36.9  C) (Axillary)   Resp 18   Ht 1.702 m (5' 7\")   Wt 71.1 kg (156 lb 12 oz)   SpO2 93%   BMI 24.55 kg/m     Neuro: Alert and oriented to self. Disoriented place, time and situation. Patient is able to answer yes/no questions. Slow to respond . No new neuro deficit noted this shift.   Cardiac: Afebrile. VSS.   Respiratory: Sating >95% on RA.  GI/: Adequate urine output via pure wick. BM x1 this shift   Diet/appetite: Tolerating reg diet.   Activity:  Lift assist with 2   Pain: At acceptable level on current regimen. No nonverbal cues or indications of pain.   Skin: No new deficits noted. Redness on sacrum/coccyx   LDA's: Pure wick     Plan: Continue with POC. Notify primary team with changes.   Goal Outcome Evaluation:                        "

## 2023-11-26 PROCEDURE — 250N000013 HC RX MED GY IP 250 OP 250 PS 637: Performed by: INTERNAL MEDICINE

## 2023-11-26 PROCEDURE — 250N000013 HC RX MED GY IP 250 OP 250 PS 637: Performed by: STUDENT IN AN ORGANIZED HEALTH CARE EDUCATION/TRAINING PROGRAM

## 2023-11-26 PROCEDURE — 120N000005 HC R&B MS OVERFLOW UMMC

## 2023-11-26 PROCEDURE — 99233 SBSQ HOSP IP/OBS HIGH 50: CPT | Performed by: INTERNAL MEDICINE

## 2023-11-26 RX ADMIN — PANTOPRAZOLE SODIUM 40 MG: 40 TABLET, DELAYED RELEASE ORAL at 16:47

## 2023-11-26 RX ADMIN — SALINE NASAL SPRAY 1 SPRAY: 1.5 SOLUTION NASAL at 16:47

## 2023-11-26 RX ADMIN — SALINE NASAL SPRAY 1 SPRAY: 1.5 SOLUTION NASAL at 18:50

## 2023-11-26 RX ADMIN — LACOSAMIDE 100 MG: 50 TABLET, FILM COATED ORAL at 08:35

## 2023-11-26 RX ADMIN — LACOSAMIDE 100 MG: 50 TABLET, FILM COATED ORAL at 18:49

## 2023-11-26 RX ADMIN — BUSPIRONE HYDROCHLORIDE 30 MG: 30 TABLET ORAL at 18:49

## 2023-11-26 RX ADMIN — AMLODIPINE BESYLATE 5 MG: 5 TABLET ORAL at 08:35

## 2023-11-26 RX ADMIN — ALBUTEROL SULFATE 2 PUFF: 90 AEROSOL, METERED RESPIRATORY (INHALATION) at 20:04

## 2023-11-26 RX ADMIN — PANTOPRAZOLE SODIUM 40 MG: 40 TABLET, DELAYED RELEASE ORAL at 08:34

## 2023-11-26 RX ADMIN — THERA TABS 1 TABLET: TAB at 08:34

## 2023-11-26 RX ADMIN — BUSPIRONE HYDROCHLORIDE 30 MG: 30 TABLET ORAL at 08:35

## 2023-11-26 RX ADMIN — RIVAROXABAN 20 MG: 10 TABLET, FILM COATED ORAL at 16:47

## 2023-11-26 RX ADMIN — ALBUTEROL SULFATE 2 PUFF: 90 AEROSOL, METERED RESPIRATORY (INHALATION) at 02:56

## 2023-11-26 RX ADMIN — LEVETIRACETAM 1250 MG: 750 TABLET, FILM COATED ORAL at 08:35

## 2023-11-26 RX ADMIN — LEVETIRACETAM 1250 MG: 750 TABLET, FILM COATED ORAL at 18:49

## 2023-11-26 RX ADMIN — FLUOXETINE 40 MG: 20 CAPSULE ORAL at 08:35

## 2023-11-26 RX ADMIN — ALBUTEROL SULFATE 2 PUFF: 90 AEROSOL, METERED RESPIRATORY (INHALATION) at 08:33

## 2023-11-26 ASSESSMENT — ACTIVITIES OF DAILY LIVING (ADL)
ADLS_ACUITY_SCORE: 65

## 2023-11-26 NOTE — PLAN OF CARE
"/83 (BP Location: Right arm)   Pulse 78   Temp 98.5  F (36.9  C) (Axillary)   Resp 16   Ht 1.702 m (5' 7\")   Wt 74.2 kg (163 lb 9.3 oz)   SpO2 94%   BMI 25.62 kg/m          BP (!) 141/87 (BP Location: Right arm)   Pulse 71   Temp 98.5  F (36.9  C) (Axillary)   Resp 18   Ht 1.702 m (5' 7\")   Wt 71.1 kg (156 lb 12 oz)   SpO2 93%   BMI 24.55 kg/m     Neuro: Alert and oriented to self. Disoriented place, time and situation. Patient is able to answer yes/no questions. Slow to respond . No new neuro deficit noted this shift.   Cardiac: Afebrile. VSS.            Respiratory: Sating >95% on RA.  GI/: Adequate urine output via pure wick. No BM this shift   Diet/appetite: Tolerating reg diet.   Activity:  Lift assist with 2   Pain: At acceptable level on current regimen. No nonverbal cues or indications of pain.   Skin: No new deficits noted. Redness on sacrum/coccyx   LDA's: Pure wick         Goal Outcome Evaluation:                        "

## 2023-11-26 NOTE — PROGRESS NOTES
[facility]    Medicine Progress Note - Hospitalist Service, GOLD TEAM 10    Date of Admission:  10/6/2023    Assessment & Plan   Olga Bailey is a 78 year old female admitted on 10/6/2023. She has history of L frontoparietal glioblastoma s/p resection, chemotherapy and radiation with remission in 2021, recently found to have recurrence in September 2023, with baseline cognitive and functional impairment, h/o seizures, h/o DVT on xarelto, HTN, and depression who presented 10/6/23 with acute worsening of baseline aphasia as well as new facial twitching. Admitted to internal medicine for further work up and management. Neurology consulted and patient found to be having partial seizures for which lacosamide was added to keppra with improvement in seizure activity. Continued radiotherapy while on [facility] [place] but has now transferred to [place] for concomitant bevacizumab. Tolerated bevacizumab well however developed worsening headaches and tiredness at reduced dose of steroids so increased back up to 4 mg BID per rad onc. Completed 10 days of radiation on 10/25/23. Has received ongoing bevacizumab per treatment cycle, last cycle finished on 11/15 and further treatment delayed until outpatient follow up with Dr. Baker neuro oncolgoist.  Medically ready to discharge. Ongoing dispo planning, current planning for LTACH.    Interval Changes:  - ? brief staring spell witnessed by RN, lasting ~20s and self resolved. Patient seen after episode and seemed at baseline interactiveness and answering questions at times, blinking on command. Will continue to monitor  - ongoing work toward disposition, new referral to another LTACH to be placed on Monday per CM   - continue PT/OT/ST til discharge (holding on therapy today given holiday)  -  not at bedside this morning  - discontinue Bactrim given completion of steroid taper  - nasal spray ordered per RN request  - Ongoing  discussion w/ Dr. Loomis if rec inpatient f/up MRI prior to discharge or if team would prefer imaging outpatient    Discharge planning  Medically stable for discharge pending safe discharge plan  - 11/13 - primary MD had long discussion with pts  and CAMRON Miranda about discharge planning. Discussed that unfortunately TCU is not an option at this point and we need to move forward with LTC.   - 11/9 - discussed at weekly SWAT meeting due to difficult discharge.  - Recommended looking into options for increasing care at assisted living vs LTC. This was discussed with pt, daughter and  by SW on 11/9.  - RNCC/SW team working on safe discharge plan, greatly appreciate assistance. Looking at community TCU options and LTC, has been refused by  TCU multiple times.  - 11/23:  working to arrange additional care support at current living facility, additional referrals sent to LTACH as well  - PM&R consulted this admission to assist with evaluation for appropriate dispo and assess rehab potential, last evaluated on 11/13  [ ] Dispo: Plan for jail w/ additional support arrangements by  or LTACH. Will ensure PM&R f/up in place on discharge     Partial Seizure, controlled -no further seizure  Chronic aphasia   -Presented with acute (< 1 day) worsening of aphasia and new facial twitching with abnormal mouth movements concerning for partial seizure.  She underwent stroke eval in the ED which was reassuring against acute CVA  -Given recent recurrence of glioblastoma, there was concern for spread or advance of disease, though both CT and MRI were reassuring to stability of current malignancy from last imaging (9/2023).   -Neurology consulted and felt movements were consistent with partial seizure, lacosamide was added, though subsequent EEG showed some degree of persistent seizure activity, and further brief clinical seizures were observed.   -Neurology re-evaluated on 11/17 and 11/18 and EEG with no further  epileptiform activity and seizures controlled with no further concern for break thru activity and adequate treatment levels of anti epileptics  Plan:  - Neurology consulted, signed off 11/18  - Continue levetiracetam 1250 mg BID and Lacosamide 100 MG BID  - s/p steroid taper as below  - seizure precautions     H/o L frontoparietal glioblastoma s/p resection, chemotherapy and radiation (completed May 2021), with recurrence of malignancy 2023   Cognitive and functional impairment due to glioblastoma and chemoradiation   Brocas aphasia  Dementia  Seen by oncology on this visit, repeat imaging stable from September. Started on trial of steroid, eval symptom improvement.   -Previous neuro psych testing (4/2023) - demonstrate dementia  -SLP evaluation - 11/17 - severe brocas aphasia  - Oncology consulted, appreciate input - signed off, s/p bevacizumab 10/18, 11/1, 11/15 - bevacizumab now on hold until outpatient follow up per discussion with Dr. Baker (outpatient oncologist)   - Rad/Onc consulted, RT started on 10/12 for 10 fractions - finished on 10/25  - Dexamethasone 4mg IV BID  (10/9 - 10/25) - headache resolved and no new neuro symptoms. Start tapering as of 10/26 - 2 mg BID x 2 weeks (10/26-11/9), 2 mg daily x 2 weeks (11/10-11/23), then stop (taper ordered)  - holding TMP-SMX daily for PCP PPX (pt had hx of PJP), discussing need for continuation w/ oncology  - PT/OT consulted, appreciate input  - Discussing w/ outpatient Rad-onc regarding f/up and possible MRI f/up imaging prior to discharge  [ ] dispo: Likely LTC on discharge, significant discussion with primary MD, PMR and PT/OT, Case management on 11/13. [facility] unable to accept patient and recommendation per specialties for LTC placement on discharge     Likely aspiration pneumonitis: resolved  - brianna albuterol MDI q6H while awake   - DuoNeb QID PRN   - SLP consult completed, appreciate input. Soft diet level 6 with thin liquids.     Depression - Continue PTA  Buspirone and Fluoxetine   H/o DVT - Continue PTA rivaroxaban   HTN - Continue PTA Amlodipine   Hemorrhoids - topical preparation H          Diet: Snacks/Supplements Adult: Other; Send Berry Magic Cup with dinner tray and allow pt/RN to order prn also (Also likes orange if in stock. NO van or ora); With Meals  Regular Diet Adult Thin Liquids (level 0)  Snacks/Supplements Adult: Other; please send Gelatein plus pineapple + whip topping @ 2:00 and cherry geltaien + whip topping @ 10:00; Between Meals    DVT Prophylaxis: DOAC  Martino Catheter: Not present  Lines: None     Cardiac Monitoring: None  Code Status: Full Code      Disposition Plan     Expected Discharge Date: 11/27/2023    Discharge Delays: Placement - TCU  Destination: assisted living;nursing home  Discharge Comments: LTC - no seizures concerns per neuro. Still med ready          Olya Vences MD  Hospitalist Service, GOLD TEAM 10  [facility]  Securely message with MyLorry (more info)  Text page via [facility] Paging/Directory   See signed in provider for up to date coverage information  ______________________________________________________________________    Interval History   No acute events overnight. RN reporting that while in the room w/ patient, she noted a ~20s episode of patient staring off/unresponsive to voice/touch. Episode self resolved and patient returned to baseline. On evaluation soon after, patient seemed back at baseline, able to follow command to blink and able to answer most questions w/ yes/no/short phrase.   not at bedside. Oncology ok to stop bactrim given completion of steroid taper, will hold and discuss w/ . Will follow up w/ Dr. Loomis regarding MRI inpatient.     Physical Exam   Vital Signs: Temp: 98.5  F (36.9  C) Temp src: Axillary BP: 132/83 Pulse: 78   Resp: 16 SpO2: 94 % O2 Device: None (Room air)    Weight: 163 lbs 9.3 oz    General Appearance: No acute distress, laying  in bed, awake  Mouth: lip smacking occasionally noted  Respiratory: CTAB. No increased WOB. On RA  Cardiovascular: RRR. No murmur  GI: Soft. Non-tender. Non-distended.  : purewick draining yellow urine (did not assess)  Skin: No rash.   Neuro: right sided hemiparesis .  strength present in left hand and able to plantar and dorsiflex left foot. Sensation present to light touch on bilateral upper and lower distal extremities. Left hand tremor  Very slowed responses to questions w/ occasionally unable to answer questions and occasionally answers questions incorrectly (per )    Medical Decision Making       50 MINUTES SPENT BY ME on the date of service doing chart review, history, exam, documentation & further activities per the note.  MANAGEMENT DISCUSSED with the following over the past 24 hours: oncology       Data         Imaging results reviewed over the past 24 hrs:   No results found for this or any previous visit (from the past 24 hour(s)).

## 2023-11-26 NOTE — PLAN OF CARE
"Oriented to self. Had two brief staring episodes that lasted about 20 seconds each this morning. During the episodes, Olga stared straight ahead and did not respond to verbal, tactile, or painful stimulation. Prior to the first episode, patient was moved via lift from bed to chair and verbalized feeling comfortable. After the first episode, Olga responded verbally to writer, when asked if she was OK, she stated, \"yes.\" Prior to the second episode, pt was upright in chair and eating breakfast with the assistance of a nursing assistant. After the second episode, Olga would not respond verbally or follow commands and kept her eyes closed. When eyes were manually opened, her eyes were moving side-to-side. Patient slept for approximately two hours post second episode. MD notified and assessed pt at bedside. No other symptoms noted during episodes. Hypertensive to 148/81, OVSS on RA.     Denies pain. Saline spray given for dry nose. Required assist of 2 with lift to/from bed/chair. Requires total assistance with feeding, good appetite. Alarms on for safety, though has not attempted to get up herself. CARE channel and classical music turned on, along with lights for stimulation. Her  read a book to her today which she stated she enjoyed. She has slept much of shift, despite attempts to keep stimulated/awake.       Problem: Risk for Delirium  Goal: Improved Attention and Thought Clarity  Outcome: Not Progressing  Intervention: Maximize Cognitive Function  Recent Flowsheet Documentation  Taken 11/26/2023 5457 by Yudy Adkins, RN  Sensory Stimulation Regulation:   care clustered   auditory stimulation provided   television on   visual stimulation provided   music on  Reorientation Measures:   clock in view   glasses use encouraged   reorientation provided     Problem: Depressive Signs/Symptoms  Goal: Increased Participation and Engagement (Depressive Signs/Symptoms)  Outcome: Not Progressing  Goal: " "Improved Sleep (Depressive Signs/Symptoms)  Outcome: Not Progressing     Problem: Adult Inpatient Plan of Care  Goal: Plan of Care Review  Description: The Plan of Care Review/Shift note should be completed every shift.  The Outcome Evaluation is a brief statement about your assessment that the patient is improving, declining, or no change.  This information will be displayed automatically on your shift  note.  Outcome: Progressing  Goal: Patient-Specific Goal (Individualized)  Description: You can add care plan individualizations to a care plan. Examples of Individualization might be:  \"Parent requests to be called daily at 9am for status\", \"I have a hard time hearing out of my right ear\", or \"Do not touch me to wake me up as it startles  me\".  Outcome: Progressing  Goal: Absence of Hospital-Acquired Illness or Injury  Outcome: Progressing  Intervention: Identify and Manage Fall Risk  Recent Flowsheet Documentation  Taken 11/26/2023 1600 by Yudy Adkins RN  Safety Promotion/Fall Prevention:   safety round/check completed   activity supervised   assistive device/personal items within reach   clutter free environment maintained   increased rounding and observation   increase visualization of patient   lighting adjusted   nonskid shoes/slippers when out of bed   patient and family education   room near nurse's station   room organization consistent   supervised activity  Taken 11/26/2023 1500 by Yudy Adkins RN  Safety Promotion/Fall Prevention: safety round/check completed  Taken 11/26/2023 1400 by Yudy Adkins RN  Safety Promotion/Fall Prevention: safety round/check completed  Taken 11/26/2023 1300 by Yudy Adkins RN  Safety Promotion/Fall Prevention: safety round/check completed  Taken 11/26/2023 1227 by Yudy Adkins RN  Safety Promotion/Fall Prevention: safety round/check completed  Taken 11/26/2023 1200 by Yudy Adkins RN  Safety Promotion/Fall Prevention:   safety round/check completed   activity " supervised   assistive device/personal items within reach   clutter free environment maintained   increased rounding and observation   increase visualization of patient   lighting adjusted   nonskid shoes/slippers when out of bed   patient and family education   room near nurse's station   room organization consistent   supervised activity  Taken 11/26/2023 1100 by Yudy Adkins RN  Safety Promotion/Fall Prevention: safety round/check completed  Taken 11/26/2023 1030 by Yudy Adkins RN  Safety Promotion/Fall Prevention: safety round/check completed  Taken 11/26/2023 0930 by Yudy Adkins RN  Safety Promotion/Fall Prevention: safety round/check completed  Taken 11/26/2023 0832 by Yudy Adkins RN  Safety Promotion/Fall Prevention:   safety round/check completed   activity supervised   assistive device/personal items within reach   clutter free environment maintained   increased rounding and observation   increase visualization of patient   lighting adjusted   nonskid shoes/slippers when out of bed   patient and family education   room near nurse's station   room organization consistent   supervised activity  Taken 11/26/2023 0720 by Yudy Adkins RN  Safety Promotion/Fall Prevention: safety round/check completed  Intervention: Prevent Skin Injury  Recent Flowsheet Documentation  Taken 11/26/2023 1500 by Yudy Adkins RN  Body Position:   side-lying 30 degrees   weight shifting  Taken 11/26/2023 1400 by Yudy Adkins RN  Body Position:   turned   right   side-lying 30 degrees  Taken 11/26/2023 1227 by Yudy Adkins RN  Body Position: (pillow applied under left hip/buttock)   weight shifting   other (see comments)  Taken 11/26/2023 1030 by Yudy Adkins RN  Body Position: weight shifting  Taken 11/26/2023 0832 by Yudy Adkins RN  Body Position: sitting up in bed  Intervention: Prevent Infection  Recent Flowsheet Documentation  Taken 11/26/2023 1600 by Yudy Adkins RN  Infection Prevention:   hand hygiene  promoted   rest/sleep promoted   environmental surveillance performed   single patient room provided   equipment surfaces disinfected   visitors restricted/screened   personal protective equipment utilized  Taken 11/26/2023 1200 by Yudy Adkins RN  Infection Prevention:   hand hygiene promoted   rest/sleep promoted   environmental surveillance performed   single patient room provided   equipment surfaces disinfected   visitors restricted/screened   personal protective equipment utilized  Taken 11/26/2023 0832 by Yudy Adkins RN  Infection Prevention:   hand hygiene promoted   rest/sleep promoted   environmental surveillance performed   single patient room provided   equipment surfaces disinfected   visitors restricted/screened   personal protective equipment utilized     Problem: Fall Injury Risk  Goal: Absence of Fall and Fall-Related Injury  Outcome: Progressing  Intervention: Identify and Manage Contributors  Recent Flowsheet Documentation  Taken 11/26/2023 1600 by Yudy Adkins RN  Medication Review/Management:   high-risk medications identified   medications reviewed  Taken 11/26/2023 1200 by Yudy Adkins RN  Medication Review/Management:   high-risk medications identified   medications reviewed  Taken 11/26/2023 0832 by Yudy Adkins RN  Medication Review/Management:   high-risk medications identified   medications reviewed  Intervention: Promote Injury-Free Environment  Recent Flowsheet Documentation  Taken 11/26/2023 1600 by Yudy Adkins RN  Safety Promotion/Fall Prevention:   safety round/check completed   activity supervised   assistive device/personal items within reach   clutter free environment maintained   increased rounding and observation   increase visualization of patient   lighting adjusted   nonskid shoes/slippers when out of bed   patient and family education   room near nurse's station   room organization consistent   supervised activity  Taken 11/26/2023 1500 by Yudy Adkins  RN  Safety Promotion/Fall Prevention: safety round/check completed  Taken 11/26/2023 1400 by Yudy Adkins RN  Safety Promotion/Fall Prevention: safety round/check completed  Taken 11/26/2023 1300 by Yudy Adkins RN  Safety Promotion/Fall Prevention: safety round/check completed  Taken 11/26/2023 1227 by Yudy Adkins RN  Safety Promotion/Fall Prevention: safety round/check completed  Taken 11/26/2023 1200 by Yudy Adkins RN  Safety Promotion/Fall Prevention:   safety round/check completed   activity supervised   assistive device/personal items within reach   clutter free environment maintained   increased rounding and observation   increase visualization of patient   lighting adjusted   nonskid shoes/slippers when out of bed   patient and family education   room near nurse's station   room organization consistent   supervised activity  Taken 11/26/2023 1100 by Yudy Adkins RN  Safety Promotion/Fall Prevention: safety round/check completed  Taken 11/26/2023 1030 by Yudy Adkins RN  Safety Promotion/Fall Prevention: safety round/check completed  Taken 11/26/2023 0930 by Yudy Adkins RN  Safety Promotion/Fall Prevention: safety round/check completed  Taken 11/26/2023 0832 by Yudy Adkins RN  Safety Promotion/Fall Prevention:   safety round/check completed   activity supervised   assistive device/personal items within reach   clutter free environment maintained   increased rounding and observation   increase visualization of patient   lighting adjusted   nonskid shoes/slippers when out of bed   patient and family education   room near nurse's station   room organization consistent   supervised activity  Taken 11/26/2023 0720 by Yudy Adkins RN  Safety Promotion/Fall Prevention: safety round/check completed     Problem: Risk for Delirium  Goal: Optimal Coping  Outcome: Progressing  Goal: Improved Behavioral Control  Outcome: Progressing  Intervention: Minimize Safety Risk  Recent Flowsheet  Documentation  Taken 11/26/2023 1600 by Yudy Adkins RN  Enhanced Safety Measures: room near unit station  Taken 11/26/2023 1200 by Yudy Adkins RN  Enhanced Safety Measures: room near unit station  Taken 11/26/2023 0832 by Yudy Adkins RN  Communication Enhancement Strategies:   call light answered in person   extra time allowed for response   family involved in communication plan   family/caregiver assisted with communication   healthcare instructions adapted   one-step directions provided   repetition utilized   verbal and visual cues paired   verbal communication attempts encouraged  Enhanced Safety Measures: room near unit station  Goal: Improved Sleep  Outcome: Progressing     Problem: Depressive Signs/Symptoms  Goal: Enhanced Self-Esteem and Confidence (Depressive Signs/Symptoms)  Outcome: Progressing  Goal: Improved Mood Symptoms (Depressive Signs/Symptoms)  Outcome: Progressing     Problem: Plan of Care - These are the overarching goals to be used throughout the patient stay.    Goal: Absence of Hospital-Acquired Illness or Injury  Intervention: Identify and Manage Fall Risk  Recent Flowsheet Documentation  Taken 11/26/2023 1600 by Yudy Adkins RN  Safety Promotion/Fall Prevention:   safety round/check completed   activity supervised   assistive device/personal items within reach   clutter free environment maintained   increased rounding and observation   increase visualization of patient   lighting adjusted   nonskid shoes/slippers when out of bed   patient and family education   room near nurse's station   room organization consistent   supervised activity  Taken 11/26/2023 1500 by Yudy Adkins RN  Safety Promotion/Fall Prevention: safety round/check completed  Taken 11/26/2023 1400 by Yudy Adkins RN  Safety Promotion/Fall Prevention: safety round/check completed  Taken 11/26/2023 1300 by Yudy Adkins RN  Safety Promotion/Fall Prevention: safety round/check completed  Taken 11/26/2023 1227  by Cardinal, Yudy, RN  Safety Promotion/Fall Prevention: safety round/check completed  Taken 11/26/2023 1200 by Yudy Adkins RN  Safety Promotion/Fall Prevention:   safety round/check completed   activity supervised   assistive device/personal items within reach   clutter free environment maintained   increased rounding and observation   increase visualization of patient   lighting adjusted   nonskid shoes/slippers when out of bed   patient and family education   room near nurse's station   room organization consistent   supervised activity  Taken 11/26/2023 1100 by Yudy Adkins RN  Safety Promotion/Fall Prevention: safety round/check completed  Taken 11/26/2023 1030 by Yudy Adkins RN  Safety Promotion/Fall Prevention: safety round/check completed  Taken 11/26/2023 0930 by Yudy Adkins RN  Safety Promotion/Fall Prevention: safety round/check completed  Taken 11/26/2023 0832 by Yudy Adkins RN  Safety Promotion/Fall Prevention:   safety round/check completed   activity supervised   assistive device/personal items within reach   clutter free environment maintained   increased rounding and observation   increase visualization of patient   lighting adjusted   nonskid shoes/slippers when out of bed   patient and family education   room near nurse's station   room organization consistent   supervised activity  Taken 11/26/2023 0720 by Yudy Adkins RN  Safety Promotion/Fall Prevention: safety round/check completed  Intervention: Prevent Skin Injury  Recent Flowsheet Documentation  Taken 11/26/2023 1500 by Yudy Adkins RN  Body Position:   side-lying 30 degrees   weight shifting  Taken 11/26/2023 1400 by Yudy Adkins RN  Body Position:   turned   right   side-lying 30 degrees  Taken 11/26/2023 1227 by Yudy Adkins RN  Body Position: (pillow applied under left hip/buttock)   weight shifting   other (see comments)  Taken 11/26/2023 1030 by Yudy Adkins RN  Body Position: weight shifting  Taken 11/26/2023  0832 by Yudy Adkins RN  Body Position: sitting up in bed  Intervention: Prevent Infection  Recent Flowsheet Documentation  Taken 11/26/2023 1600 by Yudy Adkins RN  Infection Prevention:   hand hygiene promoted   rest/sleep promoted   environmental surveillance performed   single patient room provided   equipment surfaces disinfected   visitors restricted/screened   personal protective equipment utilized  Taken 11/26/2023 1200 by Yudy Adkins RN  Infection Prevention:   hand hygiene promoted   rest/sleep promoted   environmental surveillance performed   single patient room provided   equipment surfaces disinfected   visitors restricted/screened   personal protective equipment utilized  Taken 11/26/2023 0832 by Yudy Adkins RN  Infection Prevention:   hand hygiene promoted   rest/sleep promoted   environmental surveillance performed   single patient room provided   equipment surfaces disinfected   visitors restricted/screened   personal protective equipment utilized  Goal: Absence of Hospital-Acquired Illness or Injury  Intervention: Identify and Manage Fall Risk  Recent Flowsheet Documentation  Taken 11/26/2023 1600 by Yudy Adkins RN  Safety Promotion/Fall Prevention:   safety round/check completed   activity supervised   assistive device/personal items within reach   clutter free environment maintained   increased rounding and observation   increase visualization of patient   lighting adjusted   nonskid shoes/slippers when out of bed   patient and family education   room near nurse's station   room organization consistent   supervised activity  Taken 11/26/2023 1500 by Yudy Adkins RN  Safety Promotion/Fall Prevention: safety round/check completed  Taken 11/26/2023 1400 by Yudy Adkins RN  Safety Promotion/Fall Prevention: safety round/check completed  Taken 11/26/2023 1300 by Yudy Adkins RN  Safety Promotion/Fall Prevention: safety round/check completed  Taken 11/26/2023 1227 by Yudy Adkins  RN  Safety Promotion/Fall Prevention: safety round/check completed  Taken 11/26/2023 1200 by Yudy Adkins RN  Safety Promotion/Fall Prevention:   safety round/check completed   activity supervised   assistive device/personal items within reach   clutter free environment maintained   increased rounding and observation   increase visualization of patient   lighting adjusted   nonskid shoes/slippers when out of bed   patient and family education   room near nurse's station   room organization consistent   supervised activity  Taken 11/26/2023 1100 by Yudy Adkins RN  Safety Promotion/Fall Prevention: safety round/check completed  Taken 11/26/2023 1030 by Yudy Adkins RN  Safety Promotion/Fall Prevention: safety round/check completed  Taken 11/26/2023 0930 by Yudy Adkins RN  Safety Promotion/Fall Prevention: safety round/check completed  Taken 11/26/2023 0832 by Yudy Adkins RN  Safety Promotion/Fall Prevention:   safety round/check completed   activity supervised   assistive device/personal items within reach   clutter free environment maintained   increased rounding and observation   increase visualization of patient   lighting adjusted   nonskid shoes/slippers when out of bed   patient and family education   room near nurse's station   room organization consistent   supervised activity  Taken 11/26/2023 0720 by Yudy Adkins RN  Safety Promotion/Fall Prevention: safety round/check completed  Intervention: Prevent Skin Injury  Recent Flowsheet Documentation  Taken 11/26/2023 1500 by Yudy Adkins RN  Body Position:   side-lying 30 degrees   weight shifting  Taken 11/26/2023 1400 by Yudy Adkins RN  Body Position:   turned   right   side-lying 30 degrees  Taken 11/26/2023 1227 by Yudy Adkins RN  Body Position: (pillow applied under left hip/buttock)   weight shifting   other (see comments)  Taken 11/26/2023 1030 by Yudy Adkins RN  Body Position: weight shifting  Taken 11/26/2023 0832 by Cardinal  ELEANOR Jimenes  Body Position: sitting up in bed  Intervention: Prevent Infection  Recent Flowsheet Documentation  Taken 11/26/2023 1600 by Yudy Adkins RN  Infection Prevention:   hand hygiene promoted   rest/sleep promoted   environmental surveillance performed   single patient room provided   equipment surfaces disinfected   visitors restricted/screened   personal protective equipment utilized  Taken 11/26/2023 1200 by Yudy Adkins RN  Infection Prevention:   hand hygiene promoted   rest/sleep promoted   environmental surveillance performed   single patient room provided   equipment surfaces disinfected   visitors restricted/screened   personal protective equipment utilized  Taken 11/26/2023 0832 by Yudy Adkins RN  Infection Prevention:   hand hygiene promoted   rest/sleep promoted   environmental surveillance performed   single patient room provided   equipment surfaces disinfected   visitors restricted/screened   personal protective equipment utilized     Problem: Seizure Disorder Comorbidity  Goal: Maintenance of Seizure Control  Intervention: Maintain Seizure-Symptom Control  Recent Flowsheet Documentation  Taken 11/26/2023 1600 by Yudy Adkins RN  Seizure Precautions:   activity supervised   clutter-free environment maintained   emergency equipment at bedside  Taken 11/26/2023 1200 by Yudy Adkins RN  Seizure Precautions:   activity supervised   clutter-free environment maintained   emergency equipment at bedside  Taken 11/26/2023 0832 by Yudy Adkins RN  Seizure Precautions:   activity supervised   clutter-free environment maintained   emergency equipment at bedside     Problem: Hypertension Comorbidity  Goal: Blood Pressure in Desired Range  Intervention: Maintain Blood Pressure Management  Recent Flowsheet Documentation  Taken 11/26/2023 1600 by Yudy Adkins RN  Medication Review/Management:   high-risk medications identified   medications reviewed  Taken 11/26/2023 1200 by Yudy Adkins  RN  Medication Review/Management:   high-risk medications identified   medications reviewed  Taken 11/26/2023 0832 by Yudy Adkins RN  Medication Review/Management:   high-risk medications identified   medications reviewed     Problem: Thought Process Alteration  Goal: Optimal Thought Clarity  Intervention: Minimize Safety Risk and Altered Thought  Recent Flowsheet Documentation  Taken 11/26/2023 1600 by Yudy Adkins RN  Enhanced Safety Measures: room near unit station  Taken 11/26/2023 1200 by Yudy Adkins RN  Enhanced Safety Measures: room near unit station  Taken 11/26/2023 0832 by Yudy Adkins RN  Sensory Stimulation Regulation:   care clustered   auditory stimulation provided   television on   visual stimulation provided   music on  Enhanced Safety Measures: room near unit station     Problem: Swallowing Impairment  Goal: Optimal Eating/Swallowing without Aspiration  Intervention: Optimize Eating and Swallowing  Recent Flowsheet Documentation  Taken 11/26/2023 1600 by Yudy Adkins RN  Aspiration Precautions:   awake/alert before oral intake   distractions minimized during oral intake   oral hygiene care promoted   respiratory status monitored   upright posture maintained   liquids/solids alternated  Taken 11/26/2023 1200 by Yudy Adkins RN  Aspiration Precautions:   awake/alert before oral intake   distractions minimized during oral intake   oral hygiene care promoted   respiratory status monitored   upright posture maintained   liquids/solids alternated  Taken 11/26/2023 0832 by Yudy Adkins RN  Aspiration Precautions:   awake/alert before oral intake   distractions minimized during oral intake   oral hygiene care promoted   respiratory status monitored   upright posture maintained   liquids/solids alternated  Swallowing Interventions: Dysphagia:   upright position maintained 30 mins after intake   small bites/sips encouraged   monitored for fatigue   monitored for cough during intake   food and  liquid intake alternated   foods moistened   mouth checked for residue/pocketing   straw use encouraged  Feeding/Eating Techniques:   feeding assistance provided   close supervision provided   rest periods provided   oral mucosa moistened   monitored for feeding stress cues     Problem: Communication Impairment  Goal: Effective Communication Skills  Intervention: Optimize Communication Skills  Recent Flowsheet Documentation  Taken 11/26/2023 0832 by Yudy Adkins, RN  Communication Enhancement Strategies:   call light answered in person   extra time allowed for response   family involved in communication plan   family/caregiver assisted with communication   healthcare instructions adapted   one-step directions provided   repetition utilized   verbal and visual cues paired   verbal communication attempts encouraged

## 2023-11-27 ENCOUNTER — APPOINTMENT (OUTPATIENT)
Dept: OCCUPATIONAL THERAPY | Facility: CLINIC | Age: 78
DRG: 054 | End: 2023-11-27
Attending: STUDENT IN AN ORGANIZED HEALTH CARE EDUCATION/TRAINING PROGRAM
Payer: MEDICARE

## 2023-11-27 ENCOUNTER — APPOINTMENT (OUTPATIENT)
Dept: SPEECH THERAPY | Facility: CLINIC | Age: 78
DRG: 054 | End: 2023-11-27
Attending: STUDENT IN AN ORGANIZED HEALTH CARE EDUCATION/TRAINING PROGRAM
Payer: MEDICARE

## 2023-11-27 ENCOUNTER — APPOINTMENT (OUTPATIENT)
Dept: PHYSICAL THERAPY | Facility: CLINIC | Age: 78
DRG: 054 | End: 2023-11-27
Attending: STUDENT IN AN ORGANIZED HEALTH CARE EDUCATION/TRAINING PROGRAM
Payer: MEDICARE

## 2023-11-27 PROCEDURE — 250N000013 HC RX MED GY IP 250 OP 250 PS 637: Performed by: STUDENT IN AN ORGANIZED HEALTH CARE EDUCATION/TRAINING PROGRAM

## 2023-11-27 PROCEDURE — 99232 SBSQ HOSP IP/OBS MODERATE 35: CPT | Mod: GC | Performed by: INTERNAL MEDICINE

## 2023-11-27 PROCEDURE — 97530 THERAPEUTIC ACTIVITIES: CPT | Mod: GO

## 2023-11-27 PROCEDURE — 99233 SBSQ HOSP IP/OBS HIGH 50: CPT | Performed by: INTERNAL MEDICINE

## 2023-11-27 PROCEDURE — 120N000005 HC R&B MS OVERFLOW UMMC

## 2023-11-27 PROCEDURE — 250N000013 HC RX MED GY IP 250 OP 250 PS 637: Performed by: INTERNAL MEDICINE

## 2023-11-27 PROCEDURE — 92507 TX SP LANG VOICE COMM INDIV: CPT | Mod: GN

## 2023-11-27 PROCEDURE — 97530 THERAPEUTIC ACTIVITIES: CPT | Mod: GP | Performed by: PHYSICAL THERAPIST

## 2023-11-27 RX ORDER — LACOSAMIDE 100 MG/1
100 TABLET ORAL 2 TIMES DAILY
Qty: 120 TABLET | Refills: 0 | Status: SHIPPED | OUTPATIENT
Start: 2023-11-27 | End: 2023-12-19

## 2023-11-27 RX ORDER — POLYETHYLENE GLYCOL 3350 17 G/17G
17 POWDER, FOR SOLUTION ORAL 2 TIMES DAILY PRN
Qty: 510 G | Refills: 0 | Status: SHIPPED | OUTPATIENT
Start: 2023-11-27

## 2023-11-27 RX ADMIN — ALBUTEROL SULFATE 2 PUFF: 90 AEROSOL, METERED RESPIRATORY (INHALATION) at 01:55

## 2023-11-27 RX ADMIN — AMLODIPINE BESYLATE 5 MG: 5 TABLET ORAL at 08:47

## 2023-11-27 RX ADMIN — ALBUTEROL SULFATE 2 PUFF: 90 AEROSOL, METERED RESPIRATORY (INHALATION) at 18:46

## 2023-11-27 RX ADMIN — LACOSAMIDE 100 MG: 50 TABLET, FILM COATED ORAL at 18:47

## 2023-11-27 RX ADMIN — LEVETIRACETAM 1250 MG: 750 TABLET, FILM COATED ORAL at 18:47

## 2023-11-27 RX ADMIN — PANTOPRAZOLE SODIUM 40 MG: 40 TABLET, DELAYED RELEASE ORAL at 08:47

## 2023-11-27 RX ADMIN — RIVAROXABAN 20 MG: 10 TABLET, FILM COATED ORAL at 17:10

## 2023-11-27 RX ADMIN — THERA TABS 1 TABLET: TAB at 08:47

## 2023-11-27 RX ADMIN — LACOSAMIDE 100 MG: 50 TABLET, FILM COATED ORAL at 08:47

## 2023-11-27 RX ADMIN — ALBUTEROL SULFATE 2 PUFF: 90 AEROSOL, METERED RESPIRATORY (INHALATION) at 14:51

## 2023-11-27 RX ADMIN — ALBUTEROL SULFATE 2 PUFF: 90 AEROSOL, METERED RESPIRATORY (INHALATION) at 08:51

## 2023-11-27 RX ADMIN — BUSPIRONE HYDROCHLORIDE 30 MG: 30 TABLET ORAL at 08:47

## 2023-11-27 RX ADMIN — FLUOXETINE 40 MG: 20 CAPSULE ORAL at 08:47

## 2023-11-27 RX ADMIN — LEVETIRACETAM 1250 MG: 750 TABLET, FILM COATED ORAL at 08:47

## 2023-11-27 RX ADMIN — PANTOPRAZOLE SODIUM 40 MG: 40 TABLET, DELAYED RELEASE ORAL at 17:10

## 2023-11-27 RX ADMIN — BUSPIRONE HYDROCHLORIDE 30 MG: 30 TABLET ORAL at 18:47

## 2023-11-27 ASSESSMENT — ACTIVITIES OF DAILY LIVING (ADL)
ADLS_ACUITY_SCORE: 65

## 2023-11-27 NOTE — PROGRESS NOTES
Care Management Follow Up    Length of Stay (days): 51    Expected Discharge Date: 11/27/2023     Concerns to be Addressed: discharge planning     Patient plan of care discussed at interdisciplinary rounds: Yes    Anticipated Discharge Disposition: Transitional Care  Disposition Comments: n/a  Anticipated Discharge Services: None  Anticipated Discharge DME: None    Patient/family educated on Medicare website which has current facility and service quality ratings: yes  Education Provided on the Discharge Plan: Yes  Patient/Family in Agreement with the Plan: yes    Referrals Placed by CM/SW: [facility] ([facility])  Private pay costs discussed: transportation costs  PAS [phone]  Accepted   [facility]  [address]        Additional Information:  Camron received a call from Yvette, with [facility], they have reviewed pt again and will accept Olga, pt, as early as tomorrow.   Yvette asked if pt would be continuing with chemo therapies. SW check with Vangie CHAN. Per Vangie pt will not be receiving radiation and if pt wanted to continue with any chemo patient would need to be stronger.  CAMRON called Yevtte and updated her with this information. Yvette said Olga could arrive anytime tomorrow between 1:30-6:30pm  SW met with pt  and let her know that she was accepted and would be leaving this week.     CAMRON messaged PT to ask about transportation needs  Scheduled [facility] wheelchair transport 2:30-3:10pm    SW to follow and assist with any other discharge needs that may arise.  [facility]   5A beds:5220-40  5C beds 5417-32 (no BMT pt's)     Phone: [phone]  Pager: [phone]

## 2023-11-27 NOTE — PROGRESS NOTES
[facility]    Medicine Progress Note - Hospitalist Service, GOLD TEAM 10    Date of Admission:  10/6/2023    Assessment & Plan   Olga Bailey is a 78 year old female admitted on 10/6/2023. She has history of L frontoparietal glioblastoma s/p resection, chemotherapy and radiation with remission in 2021, recently found to have recurrence in September 2023, with baseline cognitive and functional impairment, h/o seizures, h/o DVT on xarelto, HTN, and depression who presented 10/6/23 with acute worsening of baseline aphasia as well as new facial twitching. Admitted to internal medicine for further work up and management. Neurology consulted and patient found to be having partial seizures for which lacosamide was added to keppra with improvement in seizure activity. Continued radiotherapy while on [facility] but has now transferred to [place] for concomitant bevacizumab. Tolerated bevacizumab well however developed worsening headaches and tiredness at reduced dose of steroids so increased back up to 4 mg BID per rad onc. Completed 10 days of radiation on 10/25/23. Has received ongoing bevacizumab per treatment cycle, last cycle finished on 11/15 and further treatment delayed until outpatient follow up with Dr. Baker neuro oncolgoist.  Medically ready to discharge. Plan discharge in AM    Interval Changes:  - 2 episodes of abn staring and eye movements yesterday, neurology consult, no further recs  -  accepted to TCU ([facility]), possible discharge tmr, transport arranged btw 2:30-3:10 tmr  - continue PT/OT/ST til discharge (holding on therapy today given holiday)  - MedRec completed w/  at bedside    Discharge planning  Medically stable for discharge pending safe discharge plan  - 11/13 - primary MD had long discussion with pts  and CAMRON Miranda about discharge planning. Discussed that unfortunately TCU is not an option at this point and we need to  move forward with LTC.   - 11/9 - discussed at weekly SWAT meeting due to difficult discharge.  - Recommended looking into options for increasing care at assisted living vs LTC. This was discussed with pt, daughter and  by SW on 11/9.  - RNCC/SW team working on safe discharge plan, greatly appreciate assistance. Looking at community TCU options and LTC, has been refused by  TCU multiple times.  - 11/23:  working to arrange additional care support at current living facility, additional referrals sent to LTACH as well  - PM&R consulted this admission to assist with evaluation for appropriate dispo and assess rehab potential, last evaluated on 11/13  [ ] Dispo: accepted to TCU ([facility]), discharge tmr     Partial Seizure  Chronic aphasia   -Presented with acute (< 1 day) worsening of aphasia and new facial twitching with abnormal mouth movements concerning for partial seizure.  She underwent stroke eval in the ED which was reassuring against acute CVA  -Given recent recurrence of glioblastoma, there was concern for spread or advance of disease, though both CT and MRI were reassuring to stability of current malignancy from last imaging (9/2023).   -Neurology consulted and felt movements were consistent with partial seizure, lacosamide was added, though subsequent EEG showed some degree of persistent seizure activity, and further brief clinical seizures were observed.   -Neurology re-evaluated on 11/17 and 11/18 and EEG with no further epileptiform activity and seizures controlled with no further concern for break thru activity and adequate treatment levels of anti epileptics  Plan:  - Neurology consulted, signed off 11/18, reconsult for new staring episodes 11/27 w/ no new recs  - Continue levetiracetam 1250 mg BID and Lacosamide 100 MG BID  - s/p steroid taper as below  - seizure precautions     H/o L frontoparietal glioblastoma s/p resection, chemotherapy and radiation (completed May  2021), with recurrence of malignancy 2023   Cognitive and functional impairment due to glioblastoma and chemoradiation   Brocas aphasia  Dementia  Seen by oncology on this visit, repeat imaging stable from September. Started on trial of steroid, eval symptom improvement.   -Previous neuro psych testing (4/2023) - demonstrate dementia  -SLP evaluation - 11/17 - severe brocas aphasia  - Oncology consulted, appreciate input - signed off, s/p bevacizumab 10/18, 11/1, 11/15 - bevacizumab now on hold until outpatient follow up per discussion with Dr. Baker (outpatient oncologist)   - Rad/Onc consulted, RT started on 10/12 for 10 fractions - finished on 10/25  - s/p Dexamethasone 4mg IV BID  (10/9 - 10/25) - headache resolved and no new neuro symptoms. Start tapering as of 10/26 - 2 mg BID x 2 weeks (10/26-11/9), 2 mg daily x 2 weeks (11/10-11/23), then stop   - discontinued TMP-SMX daily for PCP PPX (pt had hx of PJP) after completion of   - PT/OT consulted, appreciate input  [ ] dispo: discharge to TCU, referral to PM&R placed.  will need to reach out to Dr. Spann to schedule rad-onc follow up     Likely aspiration pneumonitis: resolved  - brianna albuterol MDI q6H while awake   - DuoNeb QID PRN   - SLP consult completed, appreciate input. Soft diet level 6 with thin liquids.     Depression - Continue PTA Buspirone and Fluoxetine   H/o DVT - Continue PTA rivaroxaban   HTN - Continue PTA Amlodipine   Hemorrhoids - topical preparation H          Diet: Snacks/Supplements Adult: Other; Send Berry Magic Cup with dinner tray and allow pt/RN to order prn also (Also likes orange if in stock. NO van or ora); With Meals  Regular Diet Adult Thin Liquids (level 0)  Snacks/Supplements Adult: Other; please send Gelatein plus pineapple + whip topping @ 2:00 and cherry geltaien + whip topping @ 10:00; Between Meals    DVT Prophylaxis: DOAC  Martino Catheter: Not present  Lines: None     Cardiac Monitoring: None  Code Status: Full Code       Disposition Plan     Expected Discharge Date: 11/27/2023    Discharge Delays: Placement - TCU  Destination: assisted living;nursing home  Discharge Comments: LTC - no seizures concerns per neuro. Still med ready          Olya Vences MD  Hospitalist Service, GOLD TEAM 10  [facility]  Securely message with [employer] (more info)  Text page via [facility] Paging/Directory   See signed in provider for up to date coverage information  ______________________________________________________________________    Interval History   Per documentation review, appears patient had 2 episodes of staring off and abn eye movements yesterday (personally only aware of 1 episode). Both episodes appear to be brief and patient returned to baseline. Patient at neuro baseline this AM. Neurology re-consulted, no further rec/workup/med changes. Discussed second episode of abn eye movement w/ , he reports that it was more of her rhythmic mouth movements and non-seizure like activity and she was tired after not from postictal concerns but tired 2/2 AM medications. Patient reporting doing well, expressed  no new concerns. Just about to eat breakfast,  going to re-heat the food. Afternoon update that patient accepted to TCU. Med Rec completed w/  at bedside.      Physical Exam   Vital Signs: Temp: 97.4  F (36.3  C) Temp src: Axillary BP: (!) 142/74 Pulse: 79   Resp: 16 SpO2: 93 % O2 Device: None (Room air)    Weight: 163 lbs 9.3 oz    General Appearance: No acute distress, laying in bed, awake   Mouth: lip smacking occasionally noted  Respiratory: CTAB. No increased WOB. On RA  Cardiovascular: RRR. No murmur  GI: Soft. Non-tender. Non-distended.  : purewick draining yellow urine (did not assess)  Skin: No rash.   Neuro: right sided hemiparesis .  strength present in left hand and able to plantar and dorsiflex left foot. Sensation present to light touch on bilateral upper and  lower distal extremities. Left hand tremor  Very slowed responses to questions w/ occasionally unable to answer questions and occasionally answers questions incorrectly (per )    Medical Decision Making       50 MINUTES SPENT BY ME on the date of service doing chart review, history, exam, documentation & further activities per the note.  MANAGEMENT DISCUSSED with the following over the past 24 hours: neurology, CM/SW       Data         Imaging results reviewed over the past 24 hrs:   No results found for this or any previous visit (from the past 24 hour(s)).

## 2023-11-27 NOTE — PLAN OF CARE
"BP (!) 140/90 (BP Location: Left arm)   Pulse 84   Temp 98.6  F (37  C) (Axillary)   Resp 16   Ht 1.702 m (5' 7\")   Wt 74.2 kg (163 lb 9.3 oz)   SpO2 94%   BMI 25.62 kg/m      AVSS on RA. Was drowsy/lethargic this morning but has been alert this afternoon. Unable to assess orientation. Responds to yes/no questions. Purewick in place. Discharging tomorrow to [facility], ride scheduled for ~ 2:30 pm. Continue with POC    Problem: Adult Inpatient Plan of Care  Goal: Plan of Care Review  Description: The Plan of Care Review/Shift note should be completed every shift.  The Outcome Evaluation is a brief statement about your assessment that the patient is improving, declining, or no change.  This information will be displayed automatically on your shift  note.  Outcome: Progressing  Goal: Absence of Hospital-Acquired Illness or Injury  Outcome: Progressing  Intervention: Identify and Manage Fall Risk  Recent Flowsheet Documentation  Taken 11/27/2023 0845 by Capo Mcguire RN  Safety Promotion/Fall Prevention:   activity supervised   assistive device/personal items within reach   clutter free environment maintained   increased rounding and observation   nonskid shoes/slippers when out of bed   room organization consistent   safety round/check completed  Taken 11/27/2023 0725 by Capo Mcguire, ELEANOR  Safety Promotion/Fall Prevention: safety round/check completed  Intervention: Prevent Infection  Recent Flowsheet Documentation  Taken 11/27/2023 1145 by Capo Mcguire, RN  Infection Prevention:   hand hygiene promoted   rest/sleep promoted   environmental surveillance performed   single patient room provided   equipment surfaces disinfected   visitors restricted/screened   personal protective equipment utilized  Taken 11/27/2023 0845 by Capo Mcguire, ELEANOR  Infection Prevention:   hand hygiene promoted   rest/sleep promoted   environmental surveillance performed   single patient room provided   equipment surfaces " disinfected   visitors restricted/screened   personal protective equipment utilized     Problem: Fall Injury Risk  Goal: Absence of Fall and Fall-Related Injury  Outcome: Progressing  Intervention: Identify and Manage Contributors  Recent Flowsheet Documentation  Taken 11/27/2023 0845 by Capo Mcguire, RN  Medication Review/Management:   high-risk medications identified   medications reviewed  Intervention: Promote Injury-Free Environment  Recent Flowsheet Documentation  Taken 11/27/2023 0845 by Capo Mcguire, RN  Safety Promotion/Fall Prevention:   activity supervised   assistive device/personal items within reach   clutter free environment maintained   increased rounding and observation   nonskid shoes/slippers when out of bed   room organization consistent   safety round/check completed  Taken 11/27/2023 0725 by Capo Mcguire, RN  Safety Promotion/Fall Prevention: safety round/check completed

## 2023-11-27 NOTE — CONSULTS
[facility]  Neurology Consultation    Patient Name:  Olga Bailey  Date of Service:  November 27, 2023    Subjective:    Olga Bailey is a 77 y/o F admitted 10/6/23 PMH significant DVT on rivaroxaban, prior suspected seizures (2021) on keppra, dementia (w/ aphasia, apraxia, global cognitive decline), tardive dyskinesia (history of antipsychotic medications) and L frontoparietal glioblastoma s/p resection w/ chemo/rads in remission 2021 until recurrence 9/2023 presenting with baseline aphasia and new facial twitching. She was hospitalized 10/6/2023 for new worsening of baseline aphasia and new facial twitching presumed 2/2 partial seizures at which time lacosamide was added to keppra with improvement of seizure activity. She remained inpatient for bevacizumab chemothreapy and radiation therapy. S/p 10d radiation 10/25, and s/p cycle bevacizumab. Neurology ws reconsulted to evaluate two 20 second staring spells.      Brief neuro history:   Stroke evaluation 10/6 negative when patient was seen in ED for worsening of baseline aphasia and new facial twitching. MRI on initial work up negative for progression of glioblastoma.  EEG illustrated 2-minute seizure event with 7 to 8 Hz posterior dominant rhythm more persistent higher amplitude notable for thera and polymorphic delta activity over  L hemisphere. Semiology included lip smackng, abrupt cessation of activity w/ some twitching if R arm stiffening, and L arm in air. Concern that seizures were 2/2 either missed dose of Keppra at prior living facility vs cancer recurrence. Vimpat loaded and subsequently maintained on 100mg BID. She was reevaluated 11/17 for evaluation of persistent seizures, and repeat EEG notable for mild to moderate diffuse encephalopathy. Her lat dose of bevacizumab 11/15, next dose planned for 12/5. She completed her steroid taper following RT 11/23.     Neuro reconsulted for two brief staring episodes  "lasting approximately 20 seconds each morning of 11/26 at which time she did not respond to verbal, tactile or painful stimulation. She recovered after the first episode and able to communicate \"yes\" when asked if ok. Following her second episode however, she would not respond verbally or follow commands and physical exam notable for nystagmus. Slept for 2 hours post second episode.     Keppra levels 11/17 32.5, lacosamide levels 9.0     This morning pt sleeping comfortably, and able to wake up to name. Intermittent responses to yes and no questions, which did improve the longer she was aswake. . Denied any acute pain, but unable to answer if she felt back to her baseline. Did endorse intact sensation to light touch.     We were additionally able to speak with her partner (???ex-, unclear in the past notes) who has been at bedside near daily for >10hrs a day. He was not present at the aforementioned witnessed events, and from his perspective he has not seen any events consistent with seizure activity. He shares that she has a past diagnosis of tardive dyskinesia (per chart review was previously on zyprexa and seroquel) - lip smacking is baseline. He additionally shares that she is especially sleepy/tired in the AM following her first dose of AEDs, with sustained improvement in cognition through the day. From what he has seen she has a waxing and waning behavior, and overall he feels that she is at baseline.     Objective:    Vitals: BP (!) 142/74 (BP Location: Left arm)   Pulse 79   Temp 97.4  F (36.3  C) (Axillary)   Resp 16   Ht 1.702 m (5' 7\")   Wt 74.2 kg (163 lb 9.3 oz)   SpO2 93%   BMI 25.62 kg/m    General: Lying in bed, NAD, easily arousable to name. Alert, and oriented to self only.    Head: Atraumatic, normocephalic   Cardiac: no lower extremity edema  Neurologic:  Mental Status: Open eyes to voice, intermittently responding to yes/no questions appropriately, speaking in single work " sentences. Intermittently able to follow commands.  Speech: Aphasia - unable to identify pencil, starts saying knuckles but cannot complete, cannot explain  what a pencil does. Apraxia of speech.   Cranial Nerves: EOM intact, without nystagmus. Facial movements symmetric at rest. CN 3,4,6 intact. No response to L peripheral visual threat unclear if true L hemianopsia vs inability to participate in exam. Unable to follow remaining cranial nerve exam. R   Motor: Bilateral resting tremor.  Moves L upper and lower extremities against gravity. Cogwheel rigidity present in upper extremities. Essential tremor possible action tremor.   Sensory: Intact to light touch x 4 extremities.   Station/Gait: Unable to assess     Pertinent Investigations:    I have personally reviewed most recent and pertinent labs, tests, and radiological images.     Assessment  Carmen Christianson is a 79 y/o F well known to the neurology service with a PMH DVT on rivaroxaban, prior suspected seizures (2021) on keppra, dementia (w/ aphasia, apraxia, global cognitive decline), tardive dyskinesia (history of antipsychotic medications) and L frontoparietal glioblastoma s/p resection w/ chemo/rads in remission 2021 until recurrence 9/2023 presenting with baseline aphasia and new facial twitching. She was hospitalized 10/6/2023 for new worsening of baseline aphasia and new facial twitching presumed 2/2 partial seizures at which time lacosamide was added. Neurology reconsulted for reevaluation of possible breakthrough seizure activity. Upon chart review, some concern that her 2 staring spells may be consist with breakthrough seizures given her lack of response to pain, and nystagmus on physical exam. However, it would be unlikely that pt would only have 2 closely timed episodes without any other breakthrough seizures following, or history of similar presentation. Her partners insight was especially helpful in that he has not appreciated any similar episodes  while visiting nearly every day for ~10hrs. It has been noted that lip smacking is part of her semiology, but in the face of known tardive dyskinesia cannot automatically associate with seizure activity. At this time we would like to hold off any increase in AED or EEG, but will continue to follow. If any changes or repeat similar episodes we will consider.      Recommendations:   -Expectant management at this time, will continue to follow   -If any repeat episodes concerning for breakthrough seizure activity will reevaluate for vEEG vs increasing lacosamide.     Thank you for involving Neurology in the care of Olga Bailey.  Please do not hesitate to call with questions/concerns (consult pager [phone]).      Patient was seen and discussed with Dr. Cee.    Aaron Carter MD  PGY-3 Internal Medicine

## 2023-11-27 NOTE — PLAN OF CARE
"BP (!) 142/74 (BP Location: Left arm)   Pulse 79   Temp 97.4  F (36.3  C) (Axillary)   Resp 16   Ht 1.702 m (5' 7\")   Wt 74.2 kg (163 lb 9.3 oz)   SpO2 93%   BMI 25.62 kg/m     Neuro: Alert and oriented to self. Disoriented place, time and situation. Patient is able to answer yes/no questions. Slow to respond . No new neuro deficit noted this shift.   Cardiac: Afebrile. VSS.            Respiratory: Sating >95% on RA.  GI/: Adequate urine output via pure wick. BM x1 this shift   Diet/appetite: Tolerating reg diet.   Activity:  Lift assist with 2   Pain: At acceptable level on current regimen. No nonverbal cues or indications of pain.   Skin: No new deficits noted. Redness on sacrum/coccyx   LDA's: Pure wick   Goal Outcome Evaluation:                        "

## 2023-11-28 ENCOUNTER — APPOINTMENT (OUTPATIENT)
Dept: PHYSICAL THERAPY | Facility: CLINIC | Age: 78
DRG: 054 | End: 2023-11-28
Attending: STUDENT IN AN ORGANIZED HEALTH CARE EDUCATION/TRAINING PROGRAM
Payer: MEDICARE

## 2023-11-28 ENCOUNTER — DOCUMENTATION ONLY (OUTPATIENT)
Dept: ONCOLOGY | Facility: CLINIC | Age: 78
End: 2023-11-28

## 2023-11-28 VITALS
BODY MASS INDEX: 25.67 KG/M2 | HEART RATE: 77 BPM | DIASTOLIC BLOOD PRESSURE: 94 MMHG | HEIGHT: 67 IN | RESPIRATION RATE: 16 BRPM | OXYGEN SATURATION: 94 % | WEIGHT: 163.58 LBS | TEMPERATURE: 98 F | SYSTOLIC BLOOD PRESSURE: 144 MMHG

## 2023-11-28 PROCEDURE — 99239 HOSP IP/OBS DSCHRG MGMT >30: CPT | Performed by: INTERNAL MEDICINE

## 2023-11-28 PROCEDURE — 97530 THERAPEUTIC ACTIVITIES: CPT | Mod: GP | Performed by: PHYSICAL THERAPIST

## 2023-11-28 PROCEDURE — 250N000013 HC RX MED GY IP 250 OP 250 PS 637: Performed by: STUDENT IN AN ORGANIZED HEALTH CARE EDUCATION/TRAINING PROGRAM

## 2023-11-28 PROCEDURE — 250N000013 HC RX MED GY IP 250 OP 250 PS 637: Performed by: INTERNAL MEDICINE

## 2023-11-28 RX ADMIN — THERA TABS 1 TABLET: TAB at 07:37

## 2023-11-28 RX ADMIN — AMLODIPINE BESYLATE 5 MG: 5 TABLET ORAL at 07:37

## 2023-11-28 RX ADMIN — LACOSAMIDE 100 MG: 50 TABLET, FILM COATED ORAL at 07:37

## 2023-11-28 RX ADMIN — FLUOXETINE 40 MG: 20 CAPSULE ORAL at 07:37

## 2023-11-28 RX ADMIN — POLYETHYLENE GLYCOL 3350 17 G: 17 POWDER, FOR SOLUTION ORAL at 07:37

## 2023-11-28 RX ADMIN — ALBUTEROL SULFATE 2 PUFF: 90 AEROSOL, METERED RESPIRATORY (INHALATION) at 07:37

## 2023-11-28 RX ADMIN — PANTOPRAZOLE SODIUM 40 MG: 40 TABLET, DELAYED RELEASE ORAL at 07:37

## 2023-11-28 RX ADMIN — LEVETIRACETAM 1250 MG: 750 TABLET, FILM COATED ORAL at 07:37

## 2023-11-28 RX ADMIN — BUSPIRONE HYDROCHLORIDE 30 MG: 30 TABLET ORAL at 07:37

## 2023-11-28 RX ADMIN — SALINE NASAL SPRAY 1 SPRAY: 1.5 SOLUTION NASAL at 07:37

## 2023-11-28 ASSESSMENT — ACTIVITIES OF DAILY LIVING (ADL)
ADLS_ACUITY_SCORE: 65
ADLS_ACUITY_SCORE: 61
ADLS_ACUITY_SCORE: 65
ADLS_ACUITY_SCORE: 65
ADLS_ACUITY_SCORE: 61

## 2023-11-28 NOTE — DISCHARGE SUMMARY
[facility]  Hospitalist Discharge Summary      Date of Admission:  10/6/2023  Date of Discharge:  11/28/2023  Discharging Provider: Taryn Noel MD  Discharge Service: Hospitalist Service, GOLD TEAM 10    Discharge Diagnoses   Left frontoparietal glioblastoma s/p resection, treated with chemotherapy and radiation with remission in 2021 and recurrence in 2023  Partial seizures and chronic Broca's aphasia secondary to glioblastoma  Cognitive and functional impairment due to glioblastoma and chemoradiation  Aspiration pneumonitis  DVT  HTN  Depression  Hemorrhoids    Clinically Significant Risk Factors          Follow-ups Needed After Discharge   Follow-up Appointments     Adult [facility] Follow-up and recommended labs and tests      1. Oncology F/up scheduled for 1/16, please reach out to team w/ any   concerns or request to be seen earlier  2. Please reach out to Dr. Spann's office to reschedule your Rad-onc f/up   visit  3. PM&R referral placed, you should be contacted to set this up.   4. Please reach out to your neurologist to schedule f/up in 1-2 months    Appointments on [place] and/or [facility] (with [facility] or [facility]   provider or service). Call [phone] if you haven't heard regarding   these appointments within 7 days of discharge.            Unresulted Labs Ordered in the Past 30 Days of this Admission       No orders found from 9/6/2023 to 10/7/2023.        These results will be followed up by me.    Discharge Disposition   Discharged to nursing home  Condition at discharge: Stable    Hospital Course   Olga Bailey is a 78 year old female admitted on 10/6/2023. She has history of L frontoparietal glioblastoma s/p resection, chemotherapy and radiation with remission in 2021, recently found to have recurrence in September 2023, with baseline cognitive and functional impairment, h/o seizures, h/o DVT on xarelto, HTN, and depression who  presented 10/6/23 with acute worsening of baseline aphasia as well as new facial twitching. Admitted to internal medicine for further work up and management. Neurology consulted and patient found to be having partial seizures for which lacosamide was added to keppra with improvement in seizure activity. Continued radiotherapy while on [facility] but has now transferred to [place] for concomitant bevacizumab. Tolerated bevacizumab well however developed worsening headaches and tiredness at reduced dose of steroids so increased back up to 4 mg BID per rad onc. Completed 10 days of radiation on 10/25/23. Has received ongoing bevacizumab per treatment cycle, last cycle finished on 11/15 and further treatment delayed until outpatient follow up with Dr. Baker neuro oncolgoist.       Left frontoparietal glioblastoma s/p resection, treated with chemotherapy and radiation with remission in 2021 and recurrence in 2023  While inpatient, Oncology and Radiation Oncology followed.  Treated with bevacizumab and 10 fractions of radiation.  No further treatment for now, will need to follow up as an outpatient with both Oncology and Radiation Oncology to determine if she can tolerate additional treatment.    Partial seizures and chronic Broca's aphasia secondary to glioblastoma  Neurology consulted while inpatient.  Added lactosamide on top of levetiracetam.  Will continue these as an outpatient.    Cognitive and functional impairment due to glioblastoma and chemoradiation  Speech therapy as an outpatient.    Aspiration pneumonitis  Resolved.  Will need soft diet level 6 with thin liquids.    DVT  Continue rivaroxaban.    HTN  Continue amlodipine.    Depression  Continue buspirone and fluoxetine.    Hemorrhoids  Continue preparation H.      Consultations This Hospital Stay   NEUROLOGY GENERAL ADULT IP CONSULT  ONCOLOGY ADULT IP CONSULT  SPEECH LANGUAGE PATH ADULT IP CONSULT  PHYSICAL THERAPY ADULT IP CONSULT  OCCUPATIONAL THERAPY  ADULT IP CONSULT  RADIATION ONCOLOGY IP CONSULT  SPEECH LANGUAGE PATH ADULT IP CONSULT  CARE MANAGEMENT / SOCIAL WORK IP CONSULT  OCCUPATIONAL THERAPY ADULT IP CONSULT  OCCUPATIONAL THERAPY ADULT IP CONSULT  PHYSICAL THERAPY ADULT IP CONSULT  SPEECH LANGUAGE PATH ADULT IP CONSULT  NURSING TO CONSULT FOR VASCULAR ACCESS CARE IP CONSULT  SPEECH LANGUAGE PATH ADULT IP CONSULT  OCCUPATIONAL THERAPY ADULT IP CONSULT  PHYSICAL MEDICINE & REHAB ASSESSMENT FOR REHAB PLACEMENT ADULT IP CONSULT  NURSING TO CONSULT FOR VASCULAR ACCESS CARE IP CONSULT  PHYSICAL MEDICINE & REHAB ASSESSMENT FOR REHAB PLACEMENT ADULT IP CONSULT  PROSTHETICS IP CONSULT  NURSING TO CONSULT FOR VASCULAR ACCESS CARE IP CONSULT  NUTRITION SERVICES ADULT IP CONSULT  NEUROLOGY GENERAL ADULT IP CONSULT  SPEECH LANGUAGE PATH ADULT IP CONSULT  NURSING TO CONSULT FOR VASCULAR ACCESS CARE IP CONSULT  NURSING TO CONSULT FOR VASCULAR ACCESS CARE IP CONSULT  NEUROLOGY GENERAL ADULT IP CONSULT    Code Status   Full Code    Time Spent on this Encounter   I, Taryn Noel MD, personally saw the patient today and spent greater than 30 minutes discharging this patient.       Taryn Noel MD  [facility] [address] [address]  Phone: [phone]  Fax: [phone]  ______________________________________________________________________    Physical Exam   Vital Signs: Temp: 98  F (36.7  C) Temp src: Axillary BP: (!) 144/94 Pulse: 77   Resp: 16 SpO2: 94 % O2 Device: None (Room air)    Weight: 163 lbs 9.3 oz  General Appearance: Sitting in a chair in NAD  Respiratory: CTAB, no use of accessory muscles  Cardiovascular: RRR  GI: NABS  Skin: No rashes over exposed skin.  Neuro: Sleepy, not verbally answering questions        Primary Care Physician   [facility]    Discharge Orders      Adult Physical Medicine and Rehab  Referral      Speech Therapy Referral      Treatment  Conditions 4    No urine protein testing needed.     Treatment Conditions 4    No urine protein testing needed.     Treatment Conditions 4    No urine protein testing needed.     Reason for your hospital stay    H/o L. Frontoparietal glioblastoma s/p resection, chemotherapy and radiation(2021) w/ recurrence of malignancy 2023  Cognitive and functional impairment 2/2 glioblastoma and chemoradiation  Chronic Brocas Aphasia  Dementia  Partial Seizure  Depression  Aspiration Pneumonitis  Hx of DVT  HTN  Hemorrhoids     Adult [facility] Follow-up and recommended labs and tests    1. Oncology F/up scheduled for 1/16, please reach out to team w/ any concerns or request to be seen earlier  2. Please reach out to Dr. Spann's office to reschedule your Rad-onc f/up visit  3. PM&R referral placed, you should be contacted to set this up.   4. Please reach out to your neurologist to schedule f/up in 1-2 months    Appointments on [place] and/or [facility] (with [facility] or [facility] provider or service). Call [phone] if you haven't heard regarding these appointments within 7 days of discharge.     General info for SNF    Length of Stay Estimate: Long Term Care  Condition at Discharge: Stable  Level of care:skilled   Rehabilitation Potential: Fair  Admission H&P remains valid and up-to-date: Yes  Recent Chemotherapy: Date:                     11/15/2023  Use Nursing Home Standing Orders: Yes     Mantoux instructions    Give two-step Mantoux (PPD) Per Facility Policy Yes     Activity - Up with nursing assistance     Additional Discharge Instructions    Prefers Use of Purewick Catheter w/ suction for voiding if available     Fall precautions     Miscellaneous DME Order    DME Documentation:   Describe the reason for need to support medical necessity:   H/o L frontoparietal glioblastoma s/p resection, chemotherapy and radiation (completed May 2021), with recurrence of malignancy 2023   Cognitive and functional impairment due to  glioblastoma and chemoradiation   Brocas aphasia  Dementia  Deconditioning, trouble with getting to bathroom to urinate  .     I, the undersigned, certify that the above prescribed supplies are medically necessary for this patient and is both reasonable and necessary in reference to accepted standards of medical and necessary in reference to accepted standards of medical practice in the treatment of this patient's condition and is not prescribed as a convenience.     Diet    Follow this diet upon discharge: Regular diet or High Calorie/High Protein Diet w/ Thin Liquids (0)      Snacks/Supplements Adult: Berry or Orange Magic Cup, Gelatein plus pineapple + whip topping, cherry geltaien + whip topping     Check Out Appointment Request    Clinic visit on or around 11/1/2023 with labs (CBC and CMP) and infusion appointment for bevacizumab       Significant Results and Procedures   Most Recent 3 CBC's:  Recent Labs   Lab Test 11/17/23  0749 11/15/23  0847 10/31/23  1007   WBC 5.8 4.9 6.7   HGB 10.3* 10.9* 11.1*   MCV 92 93 95   * 139* 171     Most Recent 3 BMP's:  Recent Labs   Lab Test 11/17/23  0749 11/15/23  0847 10/31/23  1007    139 138   POTASSIUM 4.1 3.9 4.8   CHLORIDE 107 106 103   CO2 23 24 27   BUN 37.8* 26.3* 25.0*   CR 0.59 0.62 0.71   ANIONGAP 10 9 8   ESTVIEN 9.4 9.0 9.5   GLC 94 94 96   ,   Results for orders placed or performed during the hospital encounter of 10/06/23   CTA Head Neck with Contrast    Narrative    EXAM: CTA HEAD NECK W CONTRAST, CT HEAD W/O CONTRAST  LOCATION: [facility]  DATE: 10/7/2023    INDICATION: Altered mental status, history of glioblastoma resection with resulting deficits.  COMPARISON: MRI 09/26/2023.  CONTRAST: 67 mL Iso-370.  TECHNIQUE: Head and neck CT angiogram with IV contrast. Noncontrast head CT followed by axial helical CT images of the head and neck vessels obtained during the arterial phase of intravenous contrast  administration. Axial 2D reconstructed images and   multiplanar 3D MIP reconstructed images of the head and neck vessels were performed by the technologist. Dose reduction techniques were used. All stenosis measurements made according to NASCET criteria unless otherwise specified.    FINDINGS:   NONCONTRAST HEAD CT:   INTRACRANIAL CONTENTS: Postsurgical changes in the left parietal region are again demonstrated. Anterior to the resection cavity, along the posterior left frontal lobe, there is a 3 mm focus of increased attenuation. On the most recent comparison brain   MRI, in this location there is a focus of hemosiderin blooming. Findings would favor a focus of mineralization. Extensive low-attenuating changes in the supratentorial white matter are demonstrated similar in extent to the FLAIR signal changes on the   prior MRI. No definite acute intracranial hemorrhage. No CT evidence of recent transcortical infarct. 1 cm presumed partially calcified meningioma in the right occipital region.     VISUALIZED ORBITS/SINUSES/MASTOIDS: No intraorbital abnormality. No paranasal sinus mucosal disease. No middle ear or mastoid effusion.    BONES/SOFT TISSUES: Left parietal craniotomy.    HEAD CTA:  ANTERIOR CIRCULATION: Mild atheromatous change in the carotid siphons. No large vessel occlusion or flow-limiting stenosis. Within the limits of CTA, no convincing aneurysm or high-flow vascular malformation. Standard [place] of [place] anatomy.    POSTERIOR CIRCULATION: No stenosis/occlusion, aneurysm, or high flow vascular malformation. Dominant right vertebral artery supplies the basilar artery with a small left vertebral artery supplying the left posterior inferior cerebellar artery (PICA).     DURAL VENOUS SINUSES: Expected enhancement of the major dural venous sinuses.    NECK CTA:  RIGHT CAROTID: No measurable stenosis or dissection.    LEFT CAROTID: No measurable stenosis or dissection.    VERTEBRAL ARTERIES: No focal  stenosis or dissection. Dominant right and smaller left vertebral arteries.    AORTIC ARCH: Classic aortic arch anatomy with no significant stenosis at the origin of the great vessels.    NONVASCULAR STRUCTURES: 2 cm right thyroid nodule.      Impression    IMPRESSION:   HEAD CT:  1.  Postsurgical changes and posttreatment changes consistent with history of glioblastoma.  2.  3 mm focus of increased attenuation along the anterior margin of the resection cavity appears to correlate with a focus of blooming on the GRE sequence from the most recent comparison brain MRI which would favor a focus of mineralization.  3.  No definite acute intracranial pathology.    HEAD CTA:   1.  No large vessel occlusion or flow-limiting stenosis.  2.  Mild atheromatous changes in the carotid siphons.    NECK CTA:  1.  Normal neck CTA.   CT Head w/o Contrast    Narrative    EXAM: CTA HEAD NECK W CONTRAST, CT HEAD W/O CONTRAST  LOCATION: [facility]  DATE: 10/7/2023    INDICATION: Altered mental status, history of glioblastoma resection with resulting deficits.  COMPARISON: MRI 09/26/2023.  CONTRAST: 67 mL Iso-370.  TECHNIQUE: Head and neck CT angiogram with IV contrast. Noncontrast head CT followed by axial helical CT images of the head and neck vessels obtained during the arterial phase of intravenous contrast administration. Axial 2D reconstructed images and   multiplanar 3D MIP reconstructed images of the head and neck vessels were performed by the technologist. Dose reduction techniques were used. All stenosis measurements made according to NASCET criteria unless otherwise specified.    FINDINGS:   NONCONTRAST HEAD CT:   INTRACRANIAL CONTENTS: Postsurgical changes in the left parietal region are again demonstrated. Anterior to the resection cavity, along the posterior left frontal lobe, there is a 3 mm focus of increased attenuation. On the most recent comparison brain   MRI, in this  location there is a focus of hemosiderin blooming. Findings would favor a focus of mineralization. Extensive low-attenuating changes in the supratentorial white matter are demonstrated similar in extent to the FLAIR signal changes on the   prior MRI. No definite acute intracranial hemorrhage. No CT evidence of recent transcortical infarct. 1 cm presumed partially calcified meningioma in the right occipital region.     VISUALIZED ORBITS/SINUSES/MASTOIDS: No intraorbital abnormality. No paranasal sinus mucosal disease. No middle ear or mastoid effusion.    BONES/SOFT TISSUES: Left parietal craniotomy.    HEAD CTA:  ANTERIOR CIRCULATION: Mild atheromatous change in the carotid siphons. No large vessel occlusion or flow-limiting stenosis. Within the limits of CTA, no convincing aneurysm or high-flow vascular malformation. Standard [place] of [place] anatomy.    POSTERIOR CIRCULATION: No stenosis/occlusion, aneurysm, or high flow vascular malformation. Dominant right vertebral artery supplies the basilar artery with a small left vertebral artery supplying the left posterior inferior cerebellar artery (PICA).     DURAL VENOUS SINUSES: Expected enhancement of the major dural venous sinuses.    NECK CTA:  RIGHT CAROTID: No measurable stenosis or dissection.    LEFT CAROTID: No measurable stenosis or dissection.    VERTEBRAL ARTERIES: No focal stenosis or dissection. Dominant right and smaller left vertebral arteries.    AORTIC ARCH: Classic aortic arch anatomy with no significant stenosis at the origin of the great vessels.    NONVASCULAR STRUCTURES: 2 cm right thyroid nodule.      Impression    IMPRESSION:   HEAD CT:  1.  Postsurgical changes and posttreatment changes consistent with history of glioblastoma.  2.  3 mm focus of increased attenuation along the anterior margin of the resection cavity appears to correlate with a focus of blooming on the GRE sequence from the most recent comparison brain MRI which would favor a  focus of mineralization.  3.  No definite acute intracranial pathology.    HEAD CTA:   1.  No large vessel occlusion or flow-limiting stenosis.  2.  Mild atheromatous changes in the carotid siphons.    NECK CTA:  1.  Normal neck CTA.   XR Chest 1 View    Narrative    EXAM: XR CHEST 1 VIEW  LOCATION: [facility]  DATE: 10/7/2023    INDICATION: Altered mental status  COMPARISON: None.      Impression    IMPRESSION: The heart is normal in size. There is mild central pulmonary venous congestion with perihilar interstitial edema present, the lungs are otherwise clear.   MR Brain w/o & w Contrast    Narrative    EXAM: MR BRAIN W/O & W CONTRAST  10/7/2023 9:38 PM     HISTORY:  Hx of glioblastoma, admitted with seizures       COMPARISON:  CT same day and MRI 9/26/2023    TECHNIQUE: Sagittal and axial T1-weighted, axial diffusion,  multiplanar T2 FLAIR with fat saturation images were obtained without  intravenous contrast. Following intravenous gadolinium-based contrast  administration, axial T2-weighted, axial susceptibility, and  T1-weighted images (in multiple planes) were obtained.    CONTRAST: 7.5mL Gadavist given via IV.    FINDINGS:  Stable postsurgical changes of left frontoparietal mass resection with  marginal resection cavity parenchymal T2 hyperintense signal. Stable  overlying dural thickening and enhancement.     Solid nodular enhancement posterior superior to the resection cavity  is unchanged in size measuring 1.5 x 2.1 x 1.1 cm (series 22 image 135  and series 21 image 114) since previous MRI. Unchanged nodular  enhancement anterior to the cavity measuring 0.5 x 0.8 x 1.1 cm  (series 22 image 114 and series 21 image 108). Stable smaller areas of  enhancement along the perisylvian frontal operculum. (series 21 image  98, 96).  Stable susceptible artifact adjacent to the cavity  consistent with hemosiderin.    Unchanged thin extra-axial fluid collection subjacent to  the  craniotomy.    There is no mass effect, midline shift, or intracranial hemorrhage.  The ventricles are proportionate to the cerebral sulci. No acute  infarct on diffusion-weighted imaging.. Major intracranial vascular  structures are within normal limits. Confluent T2 white matter  hyperintensities along the periventricular white matter consistent  with chronic small vessel ischemic disease and/or prior whole brain  radiation. Stable 1.1 x 0.7 x 1.2 cm dural based mass overlying the  right occipital lobe which presumably represents a small meningioma.    Intraocular orbital contents grossly unremarkable. The paranasal  sinuses and mastoid air cells are clear.      Impression    IMPRESSION:  1. Postsurgical changes of left frontoparietal mass resections with  unchanged nodular enhancing foci along the resection cavity since  9/2023. Stable smaller foci of enhancement along the perisylvian left  frontal operculum.  2. Otherwise, no new or acute intracranial pathology. No acute  infarct.  3. Periventricular and subcortical white matter T2 hyperintensity  representing chronic small vessel ischemic disease and/or prior  radiation changes.  4. Stable likely meningioma overlying the right occipital lobe.    I have personally reviewed the examination and initial interpretation  and I agree with the findings.    ALVIN HENRY MD         SYSTEM ID:  [id number]   XR Video Swallow with SLP or OT    Narrative    Examination:  Modified Barium Swallow Study with Speech Pathology,  10/19/2023 4:01 PM.    Comparison: 4722    History: 70-year-old hx of left glioblastoma, here for seizure, please  evaluate swallow    Fluoroscopy time: 1.4 minutes    Technique: Under fluoroscopic guidance, the patient was given orally  administered barium of varying consistencies in the presence of the  speech pathology service.     Findings:  The oral phase was normal. There is no delay in swallowing or pooling  of contrast. Intermittent  penetration above the vocal folds with thin  and mildly thickened liquid. No evidence of aspiration with any of the  administered consistency of barium including thin barium, nectar thick  barium, pudding consistency, or barium coated cracker. No significant  residue within the valleculae or piriform sinus.      Anterior cervical hardware visualized at the C3-C5 level. Surgical  clips projecting over the distal laryngeal vestibule.      Impression    Impression:   1. Intermittent shallow penetration with thin and mildly thickened  liquid, otherwise normal barium swallow.     Please see the speech pathologist report for further details regarding  the modified barium swallow study portion of the examination.    I, EDGAR BROWN MD, attest that I was immediately available to  provide guidance and assistance during the entirety of the procedure.    I have personally reviewed the examination and initial interpretation  and I agree with the findings.    EDGAR BROWN MD         SYSTEM ID:  [id number]     *Note: Due to a large number of results and/or encounters for the requested time period, some results have not been displayed. A complete set of results can be found in Results Review.       Discharge Medications   Current Discharge Medication List        START taking these medications    Details   lacosamide (VIMPAT) 100 MG TABS tablet Take 1 tablet (100 mg) by mouth 2 times daily  Qty: 120 tablet, Refills: 0    Associated Diagnoses: Glioblastoma (H); Recurrent seizures (H)      Midazolam 5 MG/0.1ML SOLN Spray 5 mg in nostril every 5 minutes as needed (For seizures lasting >5min) 5 mg (1 spray) as a single dose in 1 nostril; may repeat same dose in 10 minutes in alternate nostril based on response and tolerability  Qty: 2 each, Refills: 0    Associated Diagnoses: Recurrent seizures (H)      polyethylene glycol (MIRALAX) 17 GM/Dose powder Take 17 g by mouth 2 times daily as needed for constipation  Qty: 510 g, Refills: 0     Associated Diagnoses: Constipation, unspecified constipation type      pramox-pe-glycerin-petrolatum (PREPARATION H) 1-0.25-14.4-15 % CREA cream Place rectally 3 times daily as needed for hemorrhoids  Qty: 51 g, Refills: 0    Associated Diagnoses: External hemorrhoids      sodium chloride (OCEAN) 0.65 % nasal spray Spray 1 spray into both nostrils every hour as needed for congestion  Qty: 104 mL, Refills: 0    Associated Diagnoses: Rhinorrhea           CONTINUE these medications which have NOT CHANGED    Details   albuterol (PROAIR HFA/PROVENTIL HFA/VENTOLIN HFA) 108 (90 Base) MCG/ACT inhaler Inhale 2 puffs into the lungs every 4 hours as needed for wheezing  Qty: 18 g, Refills: 3    Comments: Pharmacy may dispense brand covered by insurance (Proair, or proventil or ventolin or generic albuterol inhaler)  Associated Diagnoses: 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, unspecified part of lung (H)      amLODIPine (NORVASC) 5 MG tablet Take 1 tablet (5 mg) by mouth daily  Qty:      Associated Diagnoses: Benign essential hypertension      busPIRone HCl (BUSPAR) 30 MG tablet Take 30 mg by mouth 2 times daily      docosanol (ABREVA) 10 % CREA cream Apply topically 5 times daily Apply to cold sore on lip    Associated Diagnoses: Cold sore      FLUoxetine (PROZAC) 20 MG capsule Take 2 capsules (40 mg) by mouth daily  Qty: 30 capsule, Refills: 0    Associated Diagnoses: Adjustment disorder with depressed mood; Episode of recurrent major depressive disorder, unspecified depression episode severity (H24)      !! levETIRAcetam (KEPPRA) 1000 MG tablet Take 1,000 mg by mouth 2 times daily Give with 250mg tab = 1250mg total dose twice daily      !! levETIRAcetam (KEPPRA) 250 MG tablet Take 250 mg by mouth 2 times daily Give with 100mg tab = 1250mg      melatonin 1 MG TABS tablet Take 5 tablets (5 mg) by mouth nightly as needed for sleep    Associated Diagnoses: Insomnia, unspecified type      multivitamin,  therapeutic (THERA-VIT) TABS tablet Take 1 tablet by mouth daily    Associated Diagnoses: Severe malnutrition (H24)      ondansetron (ZOFRAN) 4 MG tablet Take 1 tablet (4 mg) by mouth every 8 hours as needed for nausea  Qty: 30 tablet, Refills: 3    Associated Diagnoses: Chemotherapy-induced nausea and vomiting      pantoprazole (PROTONIX) 40 MG EC tablet Take 1 tablet (40 mg) by mouth 2 times daily (before meals)  Qty: 30 tablet, Refills: 0    Associated Diagnoses: Gastroesophageal reflux disease without esophagitis      rivaroxaban ANTICOAGULANT (XARELTO ANTICOAGULANT) 20 MG TABS tablet Take 1 tablet (20 mg) by mouth daily (with dinner)  Qty: 30 tablet, Refills: 3    Associated Diagnoses: GBM (glioblastoma multiforme) (H)       !! - Potential duplicate medications found. Please discuss with provider.        STOP taking these medications       acetaminophen (TYLENOL) 500 MG tablet Comments:   Reason for Stopping:         albuterol (PROVENTIL) (2.5 MG/3ML) 0.083% neb solution Comments:   Reason for Stopping:         ketoconazole (NIZORAL) 2 % external shampoo Comments:   Reason for Stopping:         loperamide (IMODIUM) 2 MG capsule Comments:   Reason for Stopping:         senna (SENOKOT) 8.6 MG tablet Comments:   Reason for Stopping:             Allergies   Allergies   Allergen Reactions    Pilocarpine Headache and Nausea and Vomiting    Chlorhexidine Itching and Rash    Aripiprazole Headache and Other (See Comments)     Insomnia, nightmares    Bacitracin Rash     Bactroban is effective; No difficulties    Erythromycin      intolerant.    Meperidine Hcl      intolerant only   Demerol    Chlorhexidine Gluconate Rash

## 2023-11-28 NOTE — PROGRESS NOTES
Prior Authorization Approval    Medication: NAYZILAM 5 MG/0.1ML JENNIFER HALEY Initiated: 11/28/2023  PA Type: Clinical    Insurance: [employer] ([facility]) - Phone [phone] Fax [phone]  [facility] Key / Reference #: [id number] / [id number]   Authorization Effective Dates: 11/28/2023 - 12/31/2024    Expected CoPay: $ 168.56  CoPay Card Eligible: No    Filling Pharmacy: [facility] [place], [place] - [address] [place] [address]  Jailyn Lemos  [facility] Liaison  Ph: [phone]  Fax: [phone]  Available on Vocera (learn more here)

## 2023-11-28 NOTE — PROGRESS NOTES
Care Management Discharge Note    Discharge Date: 11/28/2023       Discharge Disposition: Transitional Care  [facility].  [address]  The person to send info to is:  Jailyn Garlandfestus  [phone]     Discharge Services:     [facility]  [address] [place], [place] [address] [phone] (Alana RN)  [facility] ; [phone]       [facility]  [address]  [place], [place] [address] [phone]      Discharge DME:  [facility]    Discharge Transportation: health plan transportation [facility] Wheelchair transport 2:30-3:10 pm    Private pay costs discussed: private room/amenity fees    Does the patient's insurance plan have a 3 day qualifying hospital stay waiver?  No    PAS Confirmation Code: [id number]  Patient/family educated on Medicare website which has current facility and service quality ratings: yes    Education Provided on the Discharge Plan: Yes  Persons Notified of Discharge Plans: Pt. Fahad (), Nurse, MD, facility  Patient/Family in Agreement with the Plan: yes    Handoff Referral Completed: Yes    Additional Information:  CAMRON followed up with STEPHANIE Miranda , about wheel chair transport. Per Ruth pt can transport by wheelchair. Ruth will assist pt with pivot to wheelchair.   CAMRON called bedside nurse, facility, MD with transport time.  [facility], will call with Nurse to Nurse number.   Camron met with pt and  to update them on time of transport.       Ruth Triana, JIANW

## 2023-11-28 NOTE — PLAN OF CARE
"Afebrile. VSS on RA. Denies pain. Answering yes/no questions overnight, lethargic but arouses to voice. Purewick in place, pt had x1 med formed/hard stool overnight. Turned q2h. Plan [facility], ride scheduled for 2:30,     Problem: Adult Inpatient Plan of Care  Goal: Patient-Specific Goal (Individualized)  Description: You can add care plan individualizations to a care plan. Examples of Individualization might be:  \"Parent requests to be called daily at 9am for status\", \"I have a hard time hearing out of my right ear\", or \"Do not touch me to wake me up as it startles  me\".  Outcome: Progressing     Problem: Adult Inpatient Plan of Care  Goal: Absence of Hospital-Acquired Illness or Injury  Outcome: Progressing  Intervention: Identify and Manage Fall Risk  Recent Flowsheet Documentation  Taken 11/28/2023 0122 by Funmilayo Bangura RN  Safety Promotion/Fall Prevention:   safety round/check completed   increased rounding and observation   increase visualization of patient  Taken 11/28/2023 0020 by Funmilayo Bangura RN  Safety Promotion/Fall Prevention: safety round/check completed  Taken 11/27/2023 2317 by Funmilayo Bangura RN  Safety Promotion/Fall Prevention: safety round/check completed  Taken 11/27/2023 2015 by Funmilayo Bangura RN  Safety Promotion/Fall Prevention:   safety round/check completed   increased rounding and observation   increase visualization of patient  Intervention: Prevent Skin Injury  Recent Flowsheet Documentation  Taken 11/28/2023 0440 by Funmilayo Bangura RN  Body Position:   turned   right  Taken 11/28/2023 0122 by Funmilayo Bangura RN  Body Position:   turned   left   foot of bed elevated   neutral head position  Taken 11/27/2023 2317 by Funmilayo Bangura RN  Body Position:   turned   right   foot of bed elevated   heels elevated   head repositioned, right  Taken 11/27/2023 2104 by Funmilayo Bangura RN  Body Position:   turned   right   legs " elevated     Problem: Adult Inpatient Plan of Care  Goal: Absence of Hospital-Acquired Illness or Injury  Intervention: Identify and Manage Fall Risk  Recent Flowsheet Documentation  Taken 11/28/2023 0122 by Funmilayo Bangura RN  Safety Promotion/Fall Prevention:   safety round/check completed   increased rounding and observation   increase visualization of patient  Taken 11/28/2023 0020 by Funmilayo Bangura, RN  Safety Promotion/Fall Prevention: safety round/check completed  Taken 11/27/2023 2317 by Funmilayo Bangura, RN  Safety Promotion/Fall Prevention: safety round/check completed  Taken 11/27/2023 2015 by Funmilayo Bangura, RN  Safety Promotion/Fall Prevention:   safety round/check completed   increased rounding and observation   increase visualization of patient   Goal Outcome Evaluation:

## 2023-11-28 NOTE — PLAN OF CARE
Discharged to [facility]. Belongings sent with . PIV removed. Transport provided by [facility] transport services. Packet sent with . Report called to ELEANOR Landers at [facility].

## 2023-11-29 ENCOUNTER — PATIENT OUTREACH (OUTPATIENT)
Dept: CARE COORDINATION | Facility: CLINIC | Age: 78
End: 2023-11-29
Payer: MEDICARE

## 2023-11-29 ENCOUNTER — TELEPHONE (OUTPATIENT)
Dept: ONCOLOGY | Facility: CLINIC | Age: 78
End: 2023-11-29
Payer: MEDICARE

## 2023-11-29 DIAGNOSIS — C71.9 GLIOBLASTOMA (H): Primary | ICD-10-CM

## 2023-11-29 NOTE — PLAN OF CARE
Speech Language Therapy Discharge Summary    Reason for therapy discharge:    Discharged to transitional care facility.    Progress towards therapy goal(s). See goals on Care Plan in [facility] electronic health record for goal details.  Goals not met.  Barriers to achieving goals:   discharge from facility.    Therapy recommendation(s):    Continued therapy is recommended.  Rationale/Recommendations:  Recommend ongoing ST targeting basic verbal expression and auditory comprehension. Pt benefits from total communication approach (visuals, gestures, reading, writing, etc), repetition.

## 2023-11-29 NOTE — PLAN OF CARE
Occupational Therapy Discharge Summary    Reason for therapy discharge:    Discharged to transitional care facility. Therapies recommending long term care facility.    Progress towards therapy goal(s). See goals on Care Plan in Epic electronic health record for goal details.  Goals not met.  Barriers to achieving goals:   limited functional progress with therapy.    Therapy recommendation(s):    Continued therapy is recommended.  Rationale/Recommendations:  Recommend continued OT services to support quality of life and maintain functional IND.

## 2023-11-29 NOTE — PROGRESS NOTES
[facility]    Background: Transitional Care Management program identified per system criteria and reviewed by [facility] team for possible outreach.    Assessment: Upon chart review, CCRC Team member will not proceed with patient outreach related to this episode of Transitional Care Management program due to reason below:    Non-[facility] TCU: CCRC team member noted patient discharged to TCU/ARU/LTACH. Patient is not established with a [facility] currently supported by Primary Care-Care Coordination therefore handoff to Primary Care-Care Coordination is not appropriate at this time.    Plan: Transitional Care Management episode addressed appropriately per reason noted above.      AAKASH Lopez  Connected Care Resource [place], [facility]    *Connected Care Resource Team does NOT follow patient ongoing. Referrals are identified based on internal discharge reports and the outreach is to ensure patient has an understanding of their discharge instructions.

## 2023-11-29 NOTE — PLAN OF CARE
Physical Therapy Discharge Summary    Reason for therapy discharge:    Discharged to transitional care facility.    Progress towards therapy goal(s). See goals on Care Plan in [facility] electronic health record for goal details.  Goals not met.  Barriers to achieving goals:   discharge from facility.    Therapy recommendation(s):    Continued therapy is recommended.  Rationale/Recommendations:  Pt is still far below baseline with limited tolerance for therapies, primarily limited by generalized weakness and cognition. Pt has shown limited/variable progress during admission, continues to require Ax2 for bed mobility and sit to stands, and is lift dependent for bed<>chair. Recommending LTC with continuation of PT/OT to improve remaining impairments. If patient returns to prior [facility], pt would need a teresa lift, Ax2 for transfers..

## 2023-11-29 NOTE — TELEPHONE ENCOUNTER
Patient's  Paul called clinic left voicemail that Olga was discharged from TCU and that she should be scheduled for her infusion. Writer sent message to Dr. Baker clarifying patient's treatment plan and if she can be scheduled for Avastin treatments. Kathryn Mcbride RN,BSN,OCN,CBCN      Left message for  Paul that will clarify with Dr. Gay regarding further imaging prior to scheduling any Avastin treatments. Kathryn Mcbride RN,BSN,OCN,CBCN

## 2023-11-30 NOTE — TELEPHONE ENCOUNTER
Patient's  Dr. Bailey called clinic back stating Olga completed radiation on 10/25/23, spent a month in the hospital awaiting placement and was just discharged to [facility] in [place]. She had been receiving avastin treatment every  2 weeks while hospitalized.Consulted with Dr. Gay and brain MRI will be scheduled with follow up with Dr. Gay with next opening.  T      Message was left on Dr. Bailey's voicemail. Kathryn Mcbride RN,BSN,OCN,CBCN

## 2023-12-05 ENCOUNTER — TELEPHONE (OUTPATIENT)
Dept: ONCOLOGY | Facility: CLINIC | Age: 78
End: 2023-12-05
Payer: MEDICARE

## 2023-12-05 NOTE — TELEPHONE ENCOUNTER
"Pt's spouse, Fahad, is calling. States that pt is currently residing at [facility] in [place].  She has been there for the past week.  Pt's spouse is concerned as pt's morning medications, including her keppra, are making her extremely tired. States that pt is \"knocked out\" until around 1:30 pm from her morning medications. Her rehab  team is having difficulty working with her as she is sleeping so much. States that they have been told that she only has one more week left at the TCU and if no improvement, then they will discharge back to nursing home.   Pt's spouse is very concerned that she has not been able to meet with rehab team.  He is wondering if her medications could be adjusted? Or could serum levels be checked?  Her muscle mass is diminishing, and he is concerned that pt may have increase in depression symptoms.    He states that family MD will be rounding tomorrow, but he is really requesting input from Dr Baker on pt's medications and dosing.     Will route to care team for review and recommendations.     OK to leave a detailed message if unable to reach pt's spouse.    Kathy Paul RN on 12/5/2023 at 11:43 AM   "

## 2023-12-05 NOTE — TELEPHONE ENCOUNTER
Samuel, Stephanie Rodriguez MD  You; Mayra Hurley, APRN CNP; Nafisa Vee, ELEANOR2 hours ago (12:41 PM)     EN  Can you please get me the most up to date MAR for me to review.  Thanks,  Stephanie Baker MD  Neuro-oncology  12/5/2023     Writer called and talked with Maribell at [facility].  Maribell will fax pt's medication list to [facility], attention: Nafisa.  She will do this right away.     Kathy Paul, RN on 12/5/2023 at 2:54 PM

## 2023-12-06 ENCOUNTER — LAB REQUISITION (OUTPATIENT)
Dept: LAB | Facility: CLINIC | Age: 78
End: 2023-12-06
Payer: MEDICARE

## 2023-12-06 DIAGNOSIS — E86.0 DEHYDRATION: ICD-10-CM

## 2023-12-06 NOTE — TELEPHONE ENCOUNTER
Writer talked with Karla nurse from TCU facility.  Karla was notified with recommendations from Dr Baker.    Karla states that pt's buspirone was just recently dose reduced by provider at TCU facility. Pt will need more time to evaluate this dose reduction.     Karla will talk to pt's spouse to see if he is willing to have pt stop taking melatonin.   She will call back with update after talking to pt's spouse.     Kathy Paul RN on 12/6/2023 at 10:08 AM

## 2023-12-06 NOTE — TELEPHONE ENCOUNTER
Samuel, Stephanie Rodriguez MD  You; Mayra Hurley APRN CNP; Nafisa Vee, RN15 hours ago (4:39 PM)     EN  I reviewed the MAR;  -Recommend stopping Melatonin in case there is a delayed sedating effect.  -Recommend talking to the prescriber of Buspirone about a dose reduction in the medication or stopping AM dosing and dose in the PM as patient is also on AM dosing of fluoxetine.    I would not dose reduce Vimpat or Keppra given her history of recurrent and challenging to control seizures.    Stephanie Baker MD  Neuro-oncology  12/5/2023     Writer called and discussed recommendations from Dr Baker with pt's spouse, and he states that he appreciates all of the assistance.  Also called to talk to nurse who is caring for pt today at TCU facility, but no one answered on the unit. Left a message for nurse to return call.    Kathy Paul RN on 12/6/2023 at 8:26 AM

## 2023-12-06 NOTE — TELEPHONE ENCOUNTER
Autumn from [facility] called back. [phone]  States that Dr Phelan saw pt today at the [facility] facility. Dr Phelan's number is [phone].    Autumn states that the following changes were made to pt's medications:  Discontinue buspar  Discontinue melatonin  Decrease fluoxetine to 20 mg daily  Decrease keppra to 500 mg at 8am, 500 mg at 1 pm, and 1000 mg at 8 pm.       Autumn states that Dr Phelan decreased the dose of keppra even though Dr Baker had requested that the dose was not decreased.  Autumn states that pt's previous dose of keppra was 1250 mg BID.    Will route update to Dr Baker and team.    Kathy Paul RN on 12/6/2023 at 4:46 PM

## 2023-12-07 LAB
ANION GAP SERPL CALCULATED.3IONS-SCNC: 13 MMOL/L (ref 7–15)
BUN SERPL-MCNC: 20.3 MG/DL (ref 8–23)
CALCIUM SERPL-MCNC: 9.6 MG/DL (ref 8.8–10.2)
CHLORIDE SERPL-SCNC: 102 MMOL/L (ref 98–107)
CREAT SERPL-MCNC: 0.6 MG/DL (ref 0.51–0.95)
DEPRECATED HCO3 PLAS-SCNC: 25 MMOL/L (ref 22–29)
EGFRCR SERPLBLD CKD-EPI 2021: >90 ML/MIN/1.73M2
GLUCOSE SERPL-MCNC: 74 MG/DL (ref 70–99)
POTASSIUM SERPL-SCNC: 3.6 MMOL/L (ref 3.4–5.3)
SODIUM SERPL-SCNC: 140 MMOL/L (ref 135–145)

## 2023-12-07 PROCEDURE — 36415 COLL VENOUS BLD VENIPUNCTURE: CPT | Performed by: NURSE PRACTITIONER

## 2023-12-07 PROCEDURE — P9603 ONE-WAY ALLOW PRORATED MILES: HCPCS | Performed by: NURSE PRACTITIONER

## 2023-12-07 PROCEDURE — 82310 ASSAY OF CALCIUM: CPT | Performed by: NURSE PRACTITIONER

## 2023-12-07 NOTE — TELEPHONE ENCOUNTER
Samuel, Stephanie Rodriguez MD  You; Mayra Hurley APRN CNP; Nafisa Vee, ELEANOR24 minutes ago (2:38 PM)     EN  I have no additional recommendations.  Let's see if this helps.    Stephanie Baker MD  Neuro-oncology  12/7/2023     Karla, nurse at [facility], has been notified with message from Dr Baker.  No further action needed at this time.    Kathy Paul RN on 12/7/2023 at 3:06 PM

## 2023-12-18 ENCOUNTER — HOSPITAL ENCOUNTER (OUTPATIENT)
Dept: MRI IMAGING | Facility: CLINIC | Age: 78
Discharge: HOME | End: 2023-12-18
Attending: PSYCHIATRY & NEUROLOGY | Admitting: PSYCHIATRY & NEUROLOGY
Payer: MEDICARE

## 2023-12-18 DIAGNOSIS — C71.9 GLIOBLASTOMA (H): ICD-10-CM

## 2023-12-18 PROCEDURE — 255N000002 HC RX 255 OP 636: Performed by: PSYCHIATRY & NEUROLOGY

## 2023-12-18 PROCEDURE — 70553 MRI BRAIN STEM W/O & W/DYE: CPT

## 2023-12-18 PROCEDURE — A9585 GADOBUTROL INJECTION: HCPCS | Performed by: PSYCHIATRY & NEUROLOGY

## 2023-12-18 RX ORDER — GADOBUTROL 604.72 MG/ML
15 INJECTION INTRAVENOUS ONCE
Status: COMPLETED | OUTPATIENT
Start: 2023-12-18 | End: 2023-12-18

## 2023-12-18 RX ADMIN — GADOBUTROL 15 ML: 604.72 INJECTION INTRAVENOUS at 16:08

## 2023-12-19 ENCOUNTER — ONCOLOGY VISIT (OUTPATIENT)
Dept: ONCOLOGY | Facility: CLINIC | Age: 78
End: 2023-12-19
Attending: PSYCHIATRY & NEUROLOGY
Payer: MEDICARE

## 2023-12-19 VITALS
SYSTOLIC BLOOD PRESSURE: 119 MMHG | HEART RATE: 77 BPM | DIASTOLIC BLOOD PRESSURE: 79 MMHG | RESPIRATION RATE: 16 BRPM | OXYGEN SATURATION: 93 % | TEMPERATURE: 98.3 F

## 2023-12-19 DIAGNOSIS — C71.9 GLIOBLASTOMA (H): Primary | ICD-10-CM

## 2023-12-19 PROCEDURE — 99215 OFFICE O/P EST HI 40 MIN: CPT | Performed by: PSYCHIATRY & NEUROLOGY

## 2023-12-19 PROCEDURE — G0463 HOSPITAL OUTPT CLINIC VISIT: HCPCS | Performed by: PSYCHIATRY & NEUROLOGY

## 2023-12-19 RX ORDER — LEVETIRACETAM 500 MG/1
500 TABLET ORAL
COMMUNITY
Start: 2021-07-15 | End: 2023-12-19

## 2023-12-19 RX ORDER — LACOSAMIDE 50 MG/1
50 TABLET ORAL 2 TIMES DAILY
Status: ON HOLD | COMMUNITY
Start: 2023-12-19 | End: 2024-05-09

## 2023-12-19 NOTE — LETTER
12/19/2023         RE: Olga Bailey  [address]  [facility] [address]        Dear Colleague,    Thank you for referring your patient, Olga Bailey, to the [facility]. Please see a copy of my visit note below.    NEURO-ONCOLOGY VISIT  Dec 19, 2023    CHIEF COMPLAINT: Ms. Olga Bailey is a 78 year old right-handed woman with a left frontoparietal glioblastoma (IDH wild-type, MGMT promoter methylated), diagnosed following gross total resection on 2/23/2021. Initiation of upfront cancer-directed treatment was delayed due to a complicated hospitalization. Given a resolving infection and lowered functional status, radiation alone was initiated on 5/4/2021 and completed on 5/27/2021.     Olga started adjuvant therapy with 150mg/m2 dosing of temozolomide, however, cycle 1 was complicated by significant hematologic toxicity. Dosing was changed to metronomic/ daily dosing in 7/2021 and this was well tolerated. Imaging from 8/2021 and 11/2021 demonstrates positive treatment effect.    Chemotherapy was placed on a hold in 10/2021 in the setting of severe constipation, but then, resumed. She has since completed the planned 6 months of adjuvant temozolomide as of 12/2021.    Imaging since 2/2022 has been without concern for cancer recurrence, however, imaging in 7/2022 showed a punctate area of contrast enhancement of indeterminate significance. Repeat imaging in 10/2022 showed near resolution of that punctate lesion and no evidence of cancer recurrence. Imaging in 1/2023 through 4/2023 has been without concern.     However, imaging in 8/2023 demonstrated changes of unclear significance that on repeat imaging in 9/2023 showed clear evidence of cancer recurrence. She underwent a repeat course of radiation therapy in October with the addition of bevacizumab (polina). Imaging in 10/2023 was largely stable. In the setting of a prolonged hospitalization in 11/2023 with no clinical  improvement with the continuation of polina, polina was discontinued.     Repeat imaging in 12/2023 demonstrated a positive treatment effect with no new evidence of cancer progression. However, Olga's clinic status reflects a very poor functional status and my recommendation is to transition to hospice for the additional support that she now requires.     Olga is presenting in follow-up accompanied by Fahad ().    HISTORY OF PRESENT ILLNESS  -Olga was non-verbal in clinic today. She was brought into clinic on a stretcher. She did not follow commands nor open her eyes when initially spoken to. She appeared comfortable aside from 2 brief episodes of coughing.  -Fahad voiced concerns that Vimpat is the main factor contributing to her fatigue.       MEDICATIONS   Current Outpatient Medications   Medication     albuterol (PROAIR HFA/PROVENTIL HFA/VENTOLIN HFA) 108 (90 Base) MCG/ACT inhaler     amLODIPine (NORVASC) 5 MG tablet     busPIRone HCl (BUSPAR) 30 MG tablet     FLUoxetine (PROZAC) 20 MG capsule     lacosamide (VIMPAT) 50 MG TABS tablet     levETIRAcetam (KEPPRA) 1000 MG tablet     levETIRAcetam (KEPPRA) 250 MG tablet     melatonin 1 MG TABS tablet     Midazolam 5 MG/0.1ML SOLN     multivitamin, therapeutic (THERA-VIT) TABS tablet     ondansetron (ZOFRAN) 4 MG tablet     pantoprazole (PROTONIX) 40 MG EC tablet     polyethylene glycol (MIRALAX) 17 GM/Dose powder     pramox-pe-glycerin-petrolatum (PREPARATION H) 1-0.25-14.4-15 % CREA cream     rivaroxaban ANTICOAGULANT (XARELTO ANTICOAGULANT) 20 MG TABS tablet     sodium chloride (OCEAN) 0.65 % nasal spray     No current facility-administered medications for this visit.     Current Outpatient Medications   Medication Sig Dispense Refill     albuterol (PROAIR HFA/PROVENTIL HFA/VENTOLIN HFA) 108 (90 Base) MCG/ACT inhaler Inhale 2 puffs into the lungs every 4 hours as needed for wheezing 18 g 3     amLODIPine (NORVASC) 5 MG tablet Take 1 tablet (5 mg) by  mouth daily       busPIRone HCl (BUSPAR) 30 MG tablet Take 30 mg by mouth 2 times daily       FLUoxetine (PROZAC) 20 MG capsule Take 2 capsules (40 mg) by mouth daily 30 capsule 0     lacosamide (VIMPAT) 50 MG TABS tablet Take 1 tablet (50 mg) by mouth 2 times daily       levETIRAcetam (KEPPRA) 1000 MG tablet Take 1,000 mg by mouth at bedtime Give with 250mg tab = 1250mg total dose twice daily       levETIRAcetam (KEPPRA) 250 MG tablet Take 500 mg by mouth daily before breakfast       melatonin 1 MG TABS tablet Take 5 tablets (5 mg) by mouth nightly as needed for sleep       Midazolam 5 MG/0.1ML SOLN Spray 5 mg in nostril every 5 minutes as needed (For seizures lasting >5min) 5 mg (1 spray) as a single dose in 1 nostril; may repeat same dose in 10 minutes in alternate nostril based on response and tolerability 2 each 0     multivitamin, therapeutic (THERA-VIT) TABS tablet Take 1 tablet by mouth daily       ondansetron (ZOFRAN) 4 MG tablet Take 1 tablet (4 mg) by mouth every 8 hours as needed for nausea 30 tablet 3     pantoprazole (PROTONIX) 40 MG EC tablet Take 1 tablet (40 mg) by mouth 2 times daily (before meals) 30 tablet 0     polyethylene glycol (MIRALAX) 17 GM/Dose powder Take 17 g by mouth 2 times daily as needed for constipation 510 g 0     pramox-pe-glycerin-petrolatum (PREPARATION H) 1-0.25-14.4-15 % CREA cream Place rectally 3 times daily as needed for hemorrhoids 51 g 0     rivaroxaban ANTICOAGULANT (XARELTO ANTICOAGULANT) 20 MG TABS tablet Take 1 tablet (20 mg) by mouth daily (with dinner) 30 tablet 3     sodium chloride (OCEAN) 0.65 % nasal spray Spray 1 spray into both nostrils every hour as needed for congestion 104 mL 0     DRUG ALLERGIES   Allergies   Allergen Reactions     Pilocarpine Headache and Nausea and Vomiting     Chlorhexidine Itching and Rash     Aripiprazole Headache and Other (See Comments)     Insomnia, nightmares     Bacitracin Rash     Bactroban is effective; No difficulties      Erythromycin      intolerant.     Meperidine Hcl      intolerant only   Demerol     Chlorhexidine Gluconate Rash       IMMUNIZATIONS   Immunization History   Administered Date(s) Administered     COVID-19 Bivalent 18+ (Moderna) 12/13/2022     COVID-19 Monovalent 18+ (Moderna) 03/18/2021, 07/22/2021, 01/18/2022     Flu 65+ Years 09/28/2020     HepB 01/01/1990     Influenza (IIV3) PF 01/01/2001     Influenza Vaccine 65+ (Fluzone HD) 09/28/2020     Influenza Vaccine >6 months,quad, PF 09/28/2020     Pneumo Conj 13-V (2010&after) 10/29/2014     Pneumococcal 23 valent 07/22/2020     TDAP Vaccine (Boostrix) 08/22/2016     Tetanus 06/13/2004     Zoster recombinant adjuvanted (SHINGRIX) 12/24/2019, 10/12/2020     Zoster vaccine, live 12/18/2008       ONCOLOGIC HISTORY  -2/2021 PRESENTATION: Progressive aphasia.   -2/19/2021 MR brain imaging with 3.3 x 2.8 x 2.8 cm enhancing mass in left frontal-parietal region. A second contrast enhancing lesion (0.5 x 1.0 x 1.0 cm) in right occipital lobe which is dural based and largely stable in size since 9/2011; likely representing a meningioma.  -2/23/2021 SURGERY: Gross total resection by Dr. Cummings  PATHOLOGY: Glioblastoma; IDH1-R132H wild-type/ IDH 1 and 2 wildtype, MGMT promoter methylated. Not BRAF mutated.   -3/23/2021 NEURO-ONC: Recommending chemoradiotherapy.   -3/2021 ADMISSION: SOB and chest pain; CT PA negative, but bilateral DVT noted on U/S. Started on Lovenox. Acute hypoxic respiratory failure in the setting of bilateral pneumonia; presumed PJP, concern for transfusion-related acute lung injury and right hemidiaphragm paralysis. Seizure. Right retroperitoneal hematoma. Ileus. Non-severe malnutrition on TPN with concern for refeeding syndrome. 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 Shock Liver.  -5/4 - 5/27/2021 RADS: 15 fractions.   -5/25/2021 NEURO-ONC/ DEVICE: Discussed the role of Optune; to be considered. Repeat MRI in 4 weeks with plans to start adjuvant temozolomide.    -6/22/2021 NEURO-ONC/ MRB/ CHEMO: Clinically improving. Imaging largely stable. Starting adjuvant temozolomide 150mg/m2 (250mg), cycle 1 (start date 6/24).   -7/20/2021 NEURO-ONC/ CHEMO: Clinically improving. Thrombocytopenia noted with adjuvant temozolomide dosing, platelets of 26K; will transition to metronomic dosing 50mg/m2 (100mg) daily to start once platelets are > 80K.    -8/17/2021 NEURO-ONC/ MRB/ CHEMO: Clinically improving. Saw Dr. Gamboa, who recommended starting anticoagulation; on Eliquis. Imaging with positive treatment response. Continue metronomic/ daily dosing at 50mg/m2 (100mg) through 12/2021.    -9/14/2021 NEURO-ONC/ CHEMO: Clinically improving. Continue metronomic/ daily dosing at 50mg/m2 (100mg) through 12/2021.    -10/12/2021 NEURO-ONC/ CHEMO: Clinically improving. Holding temozolomide and Zofran in the setting of severe constipation. Abdominal x-ray with high stool burden; consulted Dr. Hodge for management of constipation given history of ileus.   -10/28/2021 CHEMO: Temozolomide restarted.   -11/16/2021 NEURO-ONC/ MRB/ CHEMO: Clinically improving Less constipation, continued low appetite; referral to palliative care for mental health management. Referral to Dr. Agudelo to optimize rehab. Imaging with continued positive treatment response. Continue daily temozolomide.   -12/21/2021 NEURO-ONC/ CHEMO: Clinically improving in terms of appetite, energy. Stopping daily temozolomide at the end of the month. Discussion with Dr. Gamboa regarding removal of IVC filter.   -2/14/2022 MRB with a stable postoperative changes of left parietal craniotomy and tumor resection.   -3/1/2022 NEURO-ONC: Clinically deconditioned, mood is depressed; following with Cancer PM&R and Palliative Care. With imaging stable, continue imaging surveillance.   -4/4/2022 MRB with stable postoperative change of left-sided craniotomy and tumor resection.  -7/19/2022 NEURO-ONC/ MRB: Clinically improving. Imaging with no overt  "evidence of cancer recurrence, new punctate area of contrast enhancement of indeterminate significance; continue imaging surveillance.   -10/4/2022 NEURO-ONC/ MRB: Clinically with improved cognition. Recommend stopping ritalin in light of no known benefit and worsening tremor. Reestablish care with Dr. Agudelo. Imaging with no overt evidence of cancer recurrence, the punctate area of contrast enhancement nearly resolved; continue imaging surveillance.   -1/17/2023 NEURO-ONC/ MRB: Stable neurological deficits; recommending neuropsych evaluation to objectively assess cognition and capacity. Imaging with no overt evidence of cancer recurrence.  -4/18/2023 NEURO-ONC/ MRB: Decrease in Hb. Completed neuro-psych evaluation; diagnosed as having dementia and is not capable of independent decision-making. Imaging with no new concerns.   -8/22/2023 NEURO-ONC/ MRB: No new neurological concerns. Imaging with changes of unclear significance; repeat in 1 month.   -9/26/2023 NEURO-ONC/ MRB: No new neurological concerns. Imaging with evidence of cancer recurrence. Referral to radiation oncology for a discussion about repeat radiation. Remaining focused on maintaining a good quality of life.    -10/2023 RADS: 35cGy in 10 fractions.  -10/2023 CHEMO: Started bevacizumab (polina).  -10/2023 MR brain imaging with largely stable findings.   -12/19/2023 NEURO-ONC/ MRB: Very poor functional status. Imaging with no new concerns. Decreasing Vimpat dosing to 50mg BID from 100mg BID. Recommendation for transitioning to hospice; referral placed.        PHYSICAL EXAMINATION  /79 (BP Location: Left arm, Patient Position: Sitting, Cuff Size: Adult Large)   Pulse 77   Temp 98.3  F (36.8  C) (Oral)   Resp 16   SpO2 93%   Wt Readings from Last 2 Encounters:   11/25/23 74.2 kg (163 lb 9.3 oz)   04/18/23 66.2 kg (146 lb)      Ht Readings from Last 2 Encounters:   10/18/23 1.702 m (5' 7\")   08/29/23 1.6 m (5' 3\")     KPS: 40      MEDICAL " "RECORDS  Personally reviewed; PM&R notes. Hospitalization encounter.     LABS  Personally reviewed all available lab results; Labs from hospitalization.     IMAGING  Personally reviewed MR brain imaging from yesterday and compared to prior imaging. To my eye, there has been an interval reduction in contrast enhancement about the resection cavity. There is no increase in perfusion (below). T2 FLAIR is stable.         Formal read; \"1. Decreased multifocal nodular enhancement surrounding the margins of the left parietal resection cavity. 2. Persistent expansile T2 hyperintense signal surrounding the margins of the resection cavity, nonspecific. 3. Expansile T2 hyperintense signal involving left thalamus appears slightly worsened compared to the prior, nonspecific. 4. Background of confluent white matter T2 hyperintense signal likely representing chronic small vessel ischemic change and/or post treatment changes. 5. Small dural based enhancing mass along the right occipital lobe, likely meningioma, unchanged.\"    Imaging was shown to and results were reviewed with Olga and Fahad.       IMPRESSION  Clinic time of 40 minutes was spent reviewing data, in evaluation, and coordinating care for this high complexity visit. This was in addition to answering questions pertaining to my recommendations and devising the plan as outlined below.      Olga endured a prolonged hospitalization in which radiation therapy was completed. In the setting of no functional improvement with the continuation of polina, this chemotherapy was stopped. Olga's performance status has not recovered despite rehab efforts and it remains very low.    Fahad voiced concern that Vimpat is contributing to her somnolence. Olga has a history of epilepsy and with a reduction in seizure medications, there is an increased risk of breakthrough seizures, which can also contribute to somnolence. With knowing this risk, Fahad requested a dose " reduction in Vimpat and with no recent breakthrough seizure events, I placed orders to reduce Vimpat from 100mg BID to 50mg BID.    Olga's recent imaging has remained largely stable from cancer standpoint. As a result, cancer-directed therapy, including polina, is not indicated. However, even if there was a radiographic indication for further cancer-directed therapy, I would not prescribe it given Olga's current poor functional status. Based on her imaging findings, her low cognitive and functional performance is due to not only her history of brain cancer, but also the extensive and diffuse brain injury from the intense and neuro-toxic treatment (radiation, surgery, chemotherapy) that she has endured in combination with her known dementia.      Fahad remains acutely aware that Olga has a type of brain cancer for which there is currently no cure. Therefore, in the setting of no feasible treatment options plus a significant functional decline, I stressed the need for maintaining a good quality of life. Consequently, we discussed the benefits of considering hospice care at this juncture. Following this difficult discussion, Fahad is in agreement with this plan and I placed the referral.     Of note, per results of a recent neuro-psych evaluation, Olga carries the diagnosis of dementia and therefore, she is not capable of independent decision-making due to a lack in capacity and will require considerable support and guidance for important decisions.    PROBLEM LIST  Glioblastoma  Aphasia  Apraxia  Lacks capacity, dementia  Gait instability  Fatigue    PLAN  -CANCER DIRECTED THERAPY-  -As above; Transition to hospice care.     -STEROIDS-  -Off dexamethasone.    -SEIZURE MANAGEMENT-  -Given history of seizures, antiepileptics are indicated.    Continue Keppra and Vimpat.     -DVT-  -Following with Dr. Gamboa; currently on Eliquis.   -Requires lifelong anticoagulation given cancer diagnosis.   -IVC filter in  place.     Return to clinic not needed at this time. It was an honor to take part in Olga's care.     Stephanie Baker MD  Neuro-oncology      Again, thank you for allowing me to participate in the care of your patient.        Sincerely,        Stephanie Baker MD

## 2023-12-19 NOTE — PROGRESS NOTES
NEURO-ONCOLOGY VISIT  Dec 19, 2023    CHIEF COMPLAINT: Ms. Olga Bailey is a 78 year old right-handed woman with a left frontoparietal glioblastoma (IDH wild-type, MGMT promoter methylated), diagnosed following gross total resection on 2/23/2021. Initiation of upfront cancer-directed treatment was delayed due to a complicated hospitalization. Given a resolving infection and lowered functional status, radiation alone was initiated on 5/4/2021 and completed on 5/27/2021.     Olga started adjuvant therapy with 150mg/m2 dosing of temozolomide, however, cycle 1 was complicated by significant hematologic toxicity. Dosing was changed to metronomic/ daily dosing in 7/2021 and this was well tolerated. Imaging from 8/2021 and 11/2021 demonstrates positive treatment effect.    Chemotherapy was placed on a hold in 10/2021 in the setting of severe constipation, but then, resumed. She has since completed the planned 6 months of adjuvant temozolomide as of 12/2021.    Imaging since 2/2022 has been without concern for cancer recurrence, however, imaging in 7/2022 showed a punctate area of contrast enhancement of indeterminate significance. Repeat imaging in 10/2022 showed near resolution of that punctate lesion and no evidence of cancer recurrence. Imaging in 1/2023 through 4/2023 has been without concern.     However, imaging in 8/2023 demonstrated changes of unclear significance that on repeat imaging in 9/2023 showed clear evidence of cancer recurrence. She underwent a repeat course of radiation therapy in October with the addition of bevacizumab (polina). Imaging in 10/2023 was largely stable. In the setting of a prolonged hospitalization in 11/2023 with no clinical improvement with the continuation of polina, polina was discontinued.     Repeat imaging in 12/2023 demonstrated a positive treatment effect with no new evidence of cancer progression. However, Olga's clinic status reflects a very poor functional status and my  recommendation is to transition to hospice for the additional support that she now requires.     Olga is presenting in follow-up accompanied by Fahad ().    HISTORY OF PRESENT ILLNESS  -Olga was non-verbal in clinic today. She was brought into clinic on a stretcher. She did not follow commands nor open her eyes when initially spoken to. She appeared comfortable aside from 2 brief episodes of coughing.  -Fahad voiced concerns that Vimpat is the main factor contributing to her fatigue.       MEDICATIONS   Current Outpatient Medications   Medication    albuterol (PROAIR HFA/PROVENTIL HFA/VENTOLIN HFA) 108 (90 Base) MCG/ACT inhaler    amLODIPine (NORVASC) 5 MG tablet    busPIRone HCl (BUSPAR) 30 MG tablet    FLUoxetine (PROZAC) 20 MG capsule    lacosamide (VIMPAT) 50 MG TABS tablet    levETIRAcetam (KEPPRA) 1000 MG tablet    levETIRAcetam (KEPPRA) 250 MG tablet    melatonin 1 MG TABS tablet    Midazolam 5 MG/0.1ML SOLN    multivitamin, therapeutic (THERA-VIT) TABS tablet    ondansetron (ZOFRAN) 4 MG tablet    pantoprazole (PROTONIX) 40 MG EC tablet    polyethylene glycol (MIRALAX) 17 GM/Dose powder    pramox-pe-glycerin-petrolatum (PREPARATION H) 1-0.25-14.4-15 % CREA cream    rivaroxaban ANTICOAGULANT (XARELTO ANTICOAGULANT) 20 MG TABS tablet    sodium chloride (OCEAN) 0.65 % nasal spray     No current facility-administered medications for this visit.     Current Outpatient Medications   Medication Sig Dispense Refill    albuterol (PROAIR HFA/PROVENTIL HFA/VENTOLIN HFA) 108 (90 Base) MCG/ACT inhaler Inhale 2 puffs into the lungs every 4 hours as needed for wheezing 18 g 3    amLODIPine (NORVASC) 5 MG tablet Take 1 tablet (5 mg) by mouth daily      busPIRone HCl (BUSPAR) 30 MG tablet Take 30 mg by mouth 2 times daily      FLUoxetine (PROZAC) 20 MG capsule Take 2 capsules (40 mg) by mouth daily 30 capsule 0    lacosamide (VIMPAT) 50 MG TABS tablet Take 1 tablet (50 mg) by mouth 2 times daily       levETIRAcetam (KEPPRA) 1000 MG tablet Take 1,000 mg by mouth at bedtime Give with 250mg tab = 1250mg total dose twice daily      levETIRAcetam (KEPPRA) 250 MG tablet Take 500 mg by mouth daily before breakfast      melatonin 1 MG TABS tablet Take 5 tablets (5 mg) by mouth nightly as needed for sleep      Midazolam 5 MG/0.1ML SOLN Spray 5 mg in nostril every 5 minutes as needed (For seizures lasting >5min) 5 mg (1 spray) as a single dose in 1 nostril; may repeat same dose in 10 minutes in alternate nostril based on response and tolerability 2 each 0    multivitamin, therapeutic (THERA-VIT) TABS tablet Take 1 tablet by mouth daily      ondansetron (ZOFRAN) 4 MG tablet Take 1 tablet (4 mg) by mouth every 8 hours as needed for nausea 30 tablet 3    pantoprazole (PROTONIX) 40 MG EC tablet Take 1 tablet (40 mg) by mouth 2 times daily (before meals) 30 tablet 0    polyethylene glycol (MIRALAX) 17 GM/Dose powder Take 17 g by mouth 2 times daily as needed for constipation 510 g 0    pramox-pe-glycerin-petrolatum (PREPARATION H) 1-0.25-14.4-15 % CREA cream Place rectally 3 times daily as needed for hemorrhoids 51 g 0    rivaroxaban ANTICOAGULANT (XARELTO ANTICOAGULANT) 20 MG TABS tablet Take 1 tablet (20 mg) by mouth daily (with dinner) 30 tablet 3    sodium chloride (OCEAN) 0.65 % nasal spray Spray 1 spray into both nostrils every hour as needed for congestion 104 mL 0     DRUG ALLERGIES   Allergies   Allergen Reactions    Pilocarpine Headache and Nausea and Vomiting    Chlorhexidine Itching and Rash    Aripiprazole Headache and Other (See Comments)     Insomnia, nightmares    Bacitracin Rash     Bactroban is effective; No difficulties    Erythromycin      intolerant.    Meperidine Hcl      intolerant only   Demerol    Chlorhexidine Gluconate Rash       IMMUNIZATIONS   Immunization History   Administered Date(s) Administered    COVID-19 Bivalent 18+ (Moderna) 12/13/2022    COVID-19 Monovalent 18+ (Moderna) 03/18/2021,  07/22/2021, 01/18/2022    Flu 65+ Years 09/28/2020    HepB 01/01/1990    Influenza (IIV3) PF 01/01/2001    Influenza Vaccine 65+ (Fluzone HD) 09/28/2020    Influenza Vaccine >6 months,quad, PF 09/28/2020    Pneumo Conj 13-V (2010&after) 10/29/2014    Pneumococcal 23 valent 07/22/2020    TDAP Vaccine (Boostrix) 08/22/2016    Tetanus 06/13/2004    Zoster recombinant adjuvanted (SHINGRIX) 12/24/2019, 10/12/2020    Zoster vaccine, live 12/18/2008       ONCOLOGIC HISTORY  -2/2021 PRESENTATION: Progressive aphasia.   -2/19/2021 MR brain imaging with 3.3 x 2.8 x 2.8 cm enhancing mass in left frontal-parietal region. A second contrast enhancing lesion (0.5 x 1.0 x 1.0 cm) in right occipital lobe which is dural based and largely stable in size since 9/2011; likely representing a meningioma.  -2/23/2021 SURGERY: Gross total resection by Dr. Cummings  PATHOLOGY: Glioblastoma; IDH1-R132H wild-type/ IDH 1 and 2 wildtype, MGMT promoter methylated. Not BRAF mutated.   -3/23/2021 NEURO-ONC: Recommending chemoradiotherapy.   -3/2021 ADMISSION: SOB and chest pain; CT PA negative, but bilateral DVT noted on U/S. Started on Lovenox. Acute hypoxic respiratory failure in the setting of bilateral pneumonia; presumed PJP, concern for transfusion-related acute lung injury and right hemidiaphragm paralysis. Seizure. Right retroperitoneal hematoma. Ileus. Non-severe malnutrition on TPN with concern for refeeding syndrome. 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 Shock Liver.  -5/4 - 5/27/2021 RADS: 15 fractions.   -5/25/2021 NEURO-ONC/ DEVICE: Discussed the role of Optune; to be considered. Repeat MRI in 4 weeks with plans to start adjuvant temozolomide.   -6/22/2021 NEURO-ONC/ MRB/ CHEMO: Clinically improving. Imaging largely stable. Starting adjuvant temozolomide 150mg/m2 (250mg), cycle 1 (start date 6/24).   -7/20/2021 NEURO-ONC/ CHEMO: Clinically improving. Thrombocytopenia noted with adjuvant temozolomide dosing, platelets of 26K; will  transition to metronomic dosing 50mg/m2 (100mg) daily to start once platelets are > 80K.    -8/17/2021 NEURO-ONC/ MRB/ CHEMO: Clinically improving. Saw Dr. Gamboa, who recommended starting anticoagulation; on Eliquis. Imaging with positive treatment response. Continue metronomic/ daily dosing at 50mg/m2 (100mg) through 12/2021.    -9/14/2021 NEURO-ONC/ CHEMO: Clinically improving. Continue metronomic/ daily dosing at 50mg/m2 (100mg) through 12/2021.    -10/12/2021 NEURO-ONC/ CHEMO: Clinically improving. Holding temozolomide and Zofran in the setting of severe constipation. Abdominal x-ray with high stool burden; consulted Dr. Hodge for management of constipation given history of ileus.   -10/28/2021 CHEMO: Temozolomide restarted.   -11/16/2021 NEURO-ONC/ MRB/ CHEMO: Clinically improving Less constipation, continued low appetite; referral to palliative care for mental health management. Referral to Dr. Agudelo to optimize rehab. Imaging with continued positive treatment response. Continue daily temozolomide.   -12/21/2021 NEURO-ONC/ CHEMO: Clinically improving in terms of appetite, energy. Stopping daily temozolomide at the end of the month. Discussion with Dr. Gamboa regarding removal of IVC filter.   -2/14/2022 MRB with a stable postoperative changes of left parietal craniotomy and tumor resection.   -3/1/2022 NEURO-ONC: Clinically deconditioned, mood is depressed; following with Cancer PM&R and Palliative Care. With imaging stable, continue imaging surveillance.   -4/4/2022 MRB with stable postoperative change of left-sided craniotomy and tumor resection.  -7/19/2022 NEURO-ONC/ MRB: Clinically improving. Imaging with no overt evidence of cancer recurrence, new punctate area of contrast enhancement of indeterminate significance; continue imaging surveillance.   -10/4/2022 NEURO-ONC/ MRB: Clinically with improved cognition. Recommend stopping ritalin in light of no known benefit and worsening tremor. Reestablish care  "with Dr. Agudelo. Imaging with no overt evidence of cancer recurrence, the punctate area of contrast enhancement nearly resolved; continue imaging surveillance.   -1/17/2023 NEURO-ONC/ MRB: Stable neurological deficits; recommending neuropsych evaluation to objectively assess cognition and capacity. Imaging with no overt evidence of cancer recurrence.  -4/18/2023 NEURO-ONC/ MRB: Decrease in Hb. Completed neuro-psych evaluation; diagnosed as having dementia and is not capable of independent decision-making. Imaging with no new concerns.   -8/22/2023 NEURO-ONC/ MRB: No new neurological concerns. Imaging with changes of unclear significance; repeat in 1 month.   -9/26/2023 NEURO-ONC/ MRB: No new neurological concerns. Imaging with evidence of cancer recurrence. Referral to radiation oncology for a discussion about repeat radiation. Remaining focused on maintaining a good quality of life.    -10/2023 RADS: 35cGy in 10 fractions.  -10/2023 CHEMO: Started bevacizumab (polina).  -10/2023 MR brain imaging with largely stable findings.   -12/19/2023 NEURO-ONC/ MRB: Very poor functional status. Imaging with no new concerns. Decreasing Vimpat dosing to 50mg BID from 100mg BID. Recommendation for transitioning to hospice; referral placed.        PHYSICAL EXAMINATION  /79 (BP Location: Left arm, Patient Position: Sitting, Cuff Size: Adult Large)   Pulse 77   Temp 98.3  F (36.8  C) (Oral)   Resp 16   SpO2 93%   Wt Readings from Last 2 Encounters:   11/25/23 74.2 kg (163 lb 9.3 oz)   04/18/23 66.2 kg (146 lb)      Ht Readings from Last 2 Encounters:   10/18/23 1.702 m (5' 7\")   08/29/23 1.6 m (5' 3\")     KPS: 40      MEDICAL RECORDS  Personally reviewed; PM&R notes. Hospitalization encounter.     LABS  Personally reviewed all available lab results; Labs from hospitalization.     IMAGING  Personally reviewed MR brain imaging from yesterday and compared to prior imaging. To my eye, there has been an interval reduction in " "contrast enhancement about the resection cavity. There is no increase in perfusion (below). T2 FLAIR is stable.         Formal read; \"1. Decreased multifocal nodular enhancement surrounding the margins of the left parietal resection cavity. 2. Persistent expansile T2 hyperintense signal surrounding the margins of the resection cavity, nonspecific. 3. Expansile T2 hyperintense signal involving left thalamus appears slightly worsened compared to the prior, nonspecific. 4. Background of confluent white matter T2 hyperintense signal likely representing chronic small vessel ischemic change and/or post treatment changes. 5. Small dural based enhancing mass along the right occipital lobe, likely meningioma, unchanged.\"    Imaging was shown to and results were reviewed with Olga and Fahad.       IMPRESSION  Clinic time of 40 minutes was spent reviewing data, in evaluation, and coordinating care for this high complexity visit. This was in addition to answering questions pertaining to my recommendations and devising the plan as outlined below.      Olga endured a prolonged hospitalization in which radiation therapy was completed. In the setting of no functional improvement with the continuation of polina, this chemotherapy was stopped. Olga's performance status has not recovered despite rehab efforts and it remains very low.    Fahad voiced concern that Vimpat is contributing to her somnolence. Olga has a history of epilepsy and with a reduction in seizure medications, there is an increased risk of breakthrough seizures, which can also contribute to somnolence. With knowing this risk, Fahad requested a dose reduction in Vimpat and with no recent breakthrough seizure events, I placed orders to reduce Vimpat from 100mg BID to 50mg BID.    Olga's recent imaging has remained largely stable from cancer standpoint. As a result, cancer-directed therapy, including polina, is not indicated. However, even if there was " a radiographic indication for further cancer-directed therapy, I would not prescribe it given Olga's current poor functional status. Based on her imaging findings, her low cognitive and functional performance is due to not only her history of brain cancer, but also the extensive and diffuse brain injury from the intense and neuro-toxic treatment (radiation, surgery, chemotherapy) that she has endured in combination with her known dementia.      Fahad remains acutely aware that Olga has a type of brain cancer for which there is currently no cure. Therefore, in the setting of no feasible treatment options plus a significant functional decline, I stressed the need for maintaining a good quality of life. Consequently, we discussed the benefits of considering hospice care at this juncture. Following this difficult discussion, Fahad is in agreement with this plan and I placed the referral.     Of note, per results of a recent neuro-psych evaluation, Olga carries the diagnosis of dementia and therefore, she is not capable of independent decision-making due to a lack in capacity and will require considerable support and guidance for important decisions.    PROBLEM LIST  Glioblastoma  Aphasia  Apraxia  Lacks capacity, dementia  Gait instability  Fatigue    PLAN  -CANCER DIRECTED THERAPY-  -As above; Transition to hospice care.     -STEROIDS-  -Off dexamethasone.    -SEIZURE MANAGEMENT-  -Given history of seizures, antiepileptics are indicated.    Continue Keppra and Vimpat.     -DVT-  -Following with Dr. Gamboa; currently on Eliquis.   -Requires lifelong anticoagulation given cancer diagnosis.   -IVC filter in place.     Return to clinic not needed at this time. It was an honor to take part in Olga's care.     Stephanie Baker MD  Neuro-oncology

## 2023-12-20 ENCOUNTER — PATIENT OUTREACH (OUTPATIENT)
Dept: ONCOLOGY | Facility: CLINIC | Age: 78
End: 2023-12-20
Payer: MEDICARE

## 2023-12-20 NOTE — PROGRESS NOTES
[facility]: Cancer Care                                                                                          Spoke with CAMRON Acosta, from [facility]   They generally use the hospice agency [facility]  Call out to spouse, Fahad for approval of [facility] before sending referral    Signature:  Nafisa Vee RN

## 2023-12-21 ENCOUNTER — VIRTUAL VISIT (OUTPATIENT)
Dept: RADIATION ONCOLOGY | Facility: CLINIC | Age: 78
End: 2023-12-21
Attending: STUDENT IN AN ORGANIZED HEALTH CARE EDUCATION/TRAINING PROGRAM
Payer: MEDICARE

## 2023-12-21 DIAGNOSIS — C71.9 GLIOBLASTOMA (H): Primary | ICD-10-CM

## 2023-12-21 PROCEDURE — 99024 POSTOP FOLLOW-UP VISIT: CPT | Mod: 95 | Performed by: STUDENT IN AN ORGANIZED HEALTH CARE EDUCATION/TRAINING PROGRAM

## 2023-12-21 NOTE — LETTER
2023         RE: Olga Bailey  [address]  [facility] [address]        Dear Colleague,    Thank you for referring your patient, Olga Bailey, to the [facility] RADIATION ONCOLOGY. Please see a copy of my visit note below.    FOLLOW-UP VISIT    Patient Name: Olga Bailey      : 1945     Age: 78 year old        ______________________________________________________________________________     Chief Complaint   Patient presents with    Radiation Therapy     Follow up to radiation therapy      There were no vitals taken for this visit.     Date Radiation Completed: Glioblastoma: Brain 4005 cGy completed 21: Left frontal lobe 3500 cGy completed 10/25/23    Pain  Patient did not answer any questions during visit but was sleeping comfortably in chair next to partner on video    Labs  Other Labs: No    Imaging  MRI: Brain 23      Additional Instructions: Family had an informed discussion with Dr. Baker on 23 and a hospice referral was placed.      Virtual Visit Details    Type of service:  Video Visit   Video Start Time:  1508  Video End Time: 1535    Originating Location (pt. Location): Assisted Living  Distant Location (provider location):  On-site  Platform used for Video Visit: [employer]           Video-Visit Details    Type of service:  Video Visit    Video Start Time (time video started): 3:00pm    Video End Time (time video stopped): 3:15pm     Originating Location (pt. Location): Home    Distant Location (provider location): On-site- [facility]    Mode of Communication:  Video Conference via [employer]    Betsy Spann MD PhD     2023  Olga Bailey  [phone] (home)   : 1945  MRN: [id number]  Neuro-Oncologist: Stephanie Baker MD  Neurosurgeon: Dr. Cummings  Attending Radiation Oncologist: Betsy Spann MD PhD    RADIATION ONCOLOGY FOLLOW UP    History of Present Illness:  Ms. 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 She was treated with radiotherapy alone to a total dose of 3500 cGy in 10 fx  completed 10/25/23.      The patient  was last seen at [facility] on 10/25/2023 and presents today for 2 month post-radiation follow up evaluation.      Interval History:     Recently was recommended to meet with hospice and these appointments are being set up.     Last MRI brain with and without contrast on 12/18/23:  IMPRESSION:  1. Decreased multifocal nodular enhancement surrounding the margins of  the left parietal resection cavity.  2. Persistent expansile T2 hyperintense signal surrounding the margins  of the resection cavity, nonspecific.  3. Expansile T2 hyperintense signal involving left thalamus appears  slightly worsened compared to the prior, nonspecific.  4. Background of confluent white matter T2 hyperintense signal likely  representing chronic small vessel ischemic change and/or post  treatment changes.  5. Small dural based enhancing mass along the right occipital lobe,  likely meningioma, unchanged.     Today, she specifically reports the following symptoms:    Brain ROS:  Headaches-   Denies  Seizures- Denies  Nausea/vomiting-  Denies  Changes in cognition/behavior- improved fatigue since decreasing Vimpat dose decrease; not at baseline functioning compared to before recurrence   Vision/hearing changes- Denies  Other focal neuro deficits-  Denies    Review of Systems:  A complete 14 system review was negative except as noted in the HPI above.     Current systemic therapy: N/A  Anti-epileptic: Vimpat   Steroids: N/A    Current Outpatient Medications   Medication    albuterol (PROAIR HFA/PROVENTIL HFA/VENTOLIN HFA) 108 (90 Base) MCG/ACT inhaler    amLODIPine (NORVASC) 5 MG tablet    busPIRone HCl (BUSPAR) 30 MG tablet    FLUoxetine (PROZAC) 20 MG capsule    lacosamide (VIMPAT) 50 MG TABS tablet     levETIRAcetam (KEPPRA) 1000 MG tablet    levETIRAcetam (KEPPRA) 250 MG tablet    melatonin 1 MG TABS tablet    Midazolam 5 MG/0.1ML SOLN    multivitamin, therapeutic (THERA-VIT) TABS tablet    ondansetron (ZOFRAN) 4 MG tablet    pantoprazole (PROTONIX) 40 MG EC tablet    polyethylene glycol (MIRALAX) 17 GM/Dose powder    pramox-pe-glycerin-petrolatum (PREPARATION H) 1-0.25-14.4-15 % CREA cream    rivaroxaban ANTICOAGULANT (XARELTO ANTICOAGULANT) 20 MG TABS tablet    sodium chloride (OCEAN) 0.65 % nasal spray     No current facility-administered medications for this visit.        Allergies   Allergen Reactions    Pilocarpine Headache and Nausea and Vomiting    Chlorhexidine Itching and Rash    Aripiprazole Headache and Other (See Comments)     Insomnia, nightmares    Bacitracin Rash     Bactroban is effective; No difficulties    Erythromycin      intolerant.    Meperidine Hcl      intolerant only   Demerol    Chlorhexidine Gluconate Rash       Physical Exam:      ECOG: 3   KPS: 50    Vital Signs: No vital signs taken   Physical Exam (limited by video):    GENERAL: Healthy, alert and no distress  HEAD: Normocephalic, atraumatic  EYES: Eyes grossly normal to inspection.  No discharge or erythema, or obvious scleral/conjunctival abnormalities.  NOSE: No discharge, normal appearance   RESP: No audible wheeze, cough, or visible cyanosis.  No visible retractions or increased work of breathing.    SKIN: Visible skin clear. No significant rash, abnormal pigmentation or lesions.  NEURO: Cranial nerves grossly intact.  Mentation and speech appropriate for age  EXTREMITIES: No obvious edema, normal appearing range of motion   PSYCH: Mentation appears normal, affect normal/bright, judgement and insight intact, normal speech and appearance well-groomed.     Last CBC with Diff  WBC   Date Value Ref Range Status   07/09/2021 3.8 (L) 4.0 - 11.0 10e9/L Final     WBC Count   Date Value Ref Range Status   11/17/2023 5.8 4.0 - 11.0  10e3/uL Final     RBC Count   Date Value Ref Range Status   11/17/2023 3.49 (L) 3.80 - 5.20 10e6/uL Final   07/09/2021 3.52 (L) 3.8 - 5.2 10e12/L Final     Hemoglobin   Date Value Ref Range Status   11/17/2023 10.3 (L) 11.7 - 15.7 g/dL Final   07/09/2021 10.7 (L) 11.7 - 15.7 g/dL Final     Hematocrit   Date Value Ref Range Status   11/17/2023 32.1 (L) 35.0 - 47.0 % Final   07/09/2021 33.9 (L) 35.0 - 47.0 % Final     MCV   Date Value Ref Range Status   11/17/2023 92 78 - 100 fL Final   07/09/2021 96 78 - 100 fl Final     MCH   Date Value Ref Range Status   11/17/2023 29.5 26.5 - 33.0 pg Final   07/09/2021 30.4 26.5 - 33.0 pg Final     MCHC   Date Value Ref Range Status   11/17/2023 32.1 31.5 - 36.5 g/dL Final   07/09/2021 31.6 31.5 - 36.5 g/dL Final     RDW   Date Value Ref Range Status   11/17/2023 13.7 10.0 - 15.0 % Final   07/09/2021 14.7 10.0 - 15.0 % Final     Platelet Count   Date Value Ref Range Status   11/17/2023 146 (L) 150 - 450 10e3/uL Final   07/09/2021 200 150 - 450 10e9/L Final     Diff Method   Date Value Ref Range Status   07/09/2021 Automated Method  Final     % Neutrophils   Date Value Ref Range Status   11/15/2023 69 % Final   07/09/2021 60.9 % Final     % Lymphocytes   Date Value Ref Range Status   11/15/2023 22 % Final   07/09/2021 9.7 % Final     % Monocytes   Date Value Ref Range Status   11/15/2023 8 % Final   07/09/2021 15.7 % Final     % Eosinophils   Date Value Ref Range Status   11/15/2023 1 % Final   07/09/2021 12.9 % Final     % Basophils   Date Value Ref Range Status   11/15/2023 0 % Final   07/09/2021 0.3 % Final     Absolute Neutrophil   Date Value Ref Range Status   07/09/2021 2.3 1.6 - 8.3 10e9/L Final     Absolute Neutrophils   Date Value Ref Range Status   02/03/2022 2.6 1.6 - 8.3 10e3/uL Final     Absolute Lymphocytes   Date Value Ref Range Status   02/03/2022 0.2 (L) 0.8 - 5.3 10e3/uL Final   07/09/2021 0.4 (L) 0.8 - 5.3 10e9/L Final     Absolute Monocytes   Date Value Ref  Range Status   02/03/2022 0.5 0.0 - 1.3 10e3/uL Final   07/09/2021 0.6 0.0 - 1.3 10e9/L Final        Last Comprehensive Metabolic Panel:  Sodium   Date Value Ref Range Status   12/07/2023 140 135 - 145 mmol/L Final     Comment:     Reference intervals for this test were updated on 09/26/2023 to more accurately reflect our healthy population. There may be differences in the flagging of prior results with similar values performed with this method. Interpretation of those prior results can be made in the context of the updated reference intervals.    07/09/2021 139 133 - 144 mmol/L Final     Potassium   Date Value Ref Range Status   12/07/2023 3.6 3.4 - 5.3 mmol/L Final   01/13/2023 3.9 3.4 - 5.3 mmol/L Final   07/09/2021 3.7 3.4 - 5.3 mmol/L Final     Chloride   Date Value Ref Range Status   12/07/2023 102 98 - 107 mmol/L Final   01/13/2023 106 94 - 109 mmol/L Final   07/09/2021 109 94 - 109 mmol/L Final     Carbon Dioxide   Date Value Ref Range Status   07/09/2021 25 20 - 32 mmol/L Final     Carbon Dioxide (CO2)   Date Value Ref Range Status   12/07/2023 25 22 - 29 mmol/L Final   01/13/2023 28 20 - 32 mmol/L Final     Anion Gap   Date Value Ref Range Status   12/07/2023 13 7 - 15 mmol/L Final   01/13/2023 6 3 - 14 mmol/L Final   07/09/2021 5 3 - 14 mmol/L Final     Glucose   Date Value Ref Range Status   12/07/2023 74 70 - 99 mg/dL Final   01/13/2023 93 70 - 99 mg/dL Final   07/09/2021 92 70 - 99 mg/dL Final     GLUCOSE BY METER POCT   Date Value Ref Range Status   10/06/2023 116 (H) 70 - 99 mg/dL Final     Urea Nitrogen   Date Value Ref Range Status   12/07/2023 20.3 8.0 - 23.0 mg/dL Final   01/13/2023 16 7 - 30 mg/dL Final   07/09/2021 10 7 - 30 mg/dL Final     Creatinine   Date Value Ref Range Status   12/07/2023 0.60 0.51 - 0.95 mg/dL Final   07/09/2021 0.55 0.52 - 1.04 mg/dL Final     GFR Estimate   Date Value Ref Range Status   12/07/2023 >90 >60 mL/min/1.73m2 Final   07/09/2021 >90 >60 mL/min/[1.73_m2] Final      Comment:     Non  GFR Calc  Starting 12/18/2018, serum creatinine based estimated GFR (eGFR) will be   calculated using the Chronic Kidney Disease Epidemiology Collaboration   (CKD-EPI) equation.       Calcium   Date Value Ref Range Status   12/07/2023 9.6 8.8 - 10.2 mg/dL Final   07/09/2021 9.1 8.5 - 10.1 mg/dL Final        Imaging and Diagnostic Studies:   See HPI above    Assessment/Plan:  Ms. 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 She was treated with radiotherapy alone to a total dose of 3500 cGy in 10 fx  completed 10/25/23.    The patient presents without evidence of disease recurrence.  Her clinical status has declined since prior to her recurrence.     1)  Video visit with our team in 3 months with me. Olga may be exploring hospice options in the meantime so we will hold off on ordering imaging at this time.     All the patient's questions were answered to verbalized satisfaction. We instructed the patient to call with any questions or concerns in the interim.      The overall time I spent on direct patient care including self review of records, labs, and radiographic studies, as well as, direct face-to-face interaction with the patient and coordination of care with other providers was 15 minutes.     Betsy Spann MD, PhD     Department of Radiation Oncology  [facility]         Again, thank you for allowing me to participate in the care of your patient.        Sincerely,        Betsy Spann MD PhD

## 2023-12-21 NOTE — PROGRESS NOTES
FOLLOW-UP VISIT    Patient Name: Olga Bailey      : 1945     Age: 78 year old        ______________________________________________________________________________     Chief Complaint   Patient presents with    Radiation Therapy     Follow up to radiation therapy      There were no vitals taken for this visit.     Date Radiation Completed: Glioblastoma: Brain 4005 cGy completed 21: Left frontal lobe 3500 cGy completed 10/25/23    Pain  Patient did not answer any questions during visit but was sleeping comfortably in chair next to partner on video    Labs  Other Labs: No    Imaging  MRI: Brain 23      Additional Instructions: Family had an informed discussion with Dr. Baker on 23 and a hospice referral was placed.      Virtual Visit Details    Type of service:  Video Visit   Video Start Time:  1508  Video End Time: 1535    Originating Location (pt. Location): Assisted Living  Distant Location (provider location):  On-site  Platform used for Video Visit: [employer]

## 2024-01-03 NOTE — PROGRESS NOTES
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    Self-Care   Usual Activity Tolerance good   Current Activity Tolerance moderate   Regular Exercise Yes   Activity/Exercise Type walking   Equipment Currently Used at Home grab bar, tub/shower;shower chair   Activity/Exercise/Self-Care Comment walk in shower with 2 grab bars and shower chair. higher toilet, does have a sink next to toilet to A with transfer. pt just had TKA in August.    Disability/Function   Wear Glasses or Blind yes   General Information   Onset of Illness/Injury or Date of Surgery 02/17/21   Referring Physician Funmilayo Hampton, NP   Patient/Family Therapy Goal Statement (OT) pt would like to stay at [facility] until surgery. agreeable to TCU if needs to discharge.    Additional Occupational Profile Info/Pertinent History of Current Problem Olga Bailey is a 75 year old female with a history of skin carcinoma and meningioma (followed by neurologist Dr. Jayjay Tomlni) who was admitted on 2/17/2021. She presented with a brain mass. Brain mass, 3.3 cm left Frontal Parietal. per chart, crani possibly scheduled for next week, per pt next Tuesday.    Existing Precautions/Restrictions fall   General Observations and Info Reports has a slight foot drop in L foot more pronounces when fatigue. pt aware of this. August 2020 R TKA.    Cognitive Status Examination   Orientation Status orientation to person, place and time   Affect/Mental Status (Cognitive) WNL   Follows Commands WFL   Safety Deficit impulsivity  (some impulsivity noted but has good insight into deficits)  Report to BENI Steward RN  01/03/24 0710       Cognitive Status Comments pt reports more difficulty with remembering things and thinking slower. tends to drop things, difficulty spelling words which is new. difficulty transposing numbers. noted increased processing needed at times.    Visual Perception   Visual Impairment/Limitations corrective lenses full-time  (no noted impairments, cont to monitor. )   Sensory   Sensory Quick Adds   (some numbness in R hand, thumb due to mass in clavicular are)   Sensory Comments also has arthritis wears a brace at noc for arthritis, has one for the daytime but doesn't use.    Pain Assessment   Patient Currently in Pain No   Range of Motion Comprehensive   Comment, General Range of Motion B UE AROM WFL, slight decrease in R shoulder flexion due to clavicular mass and surgery for removal in past that caused nerve damage.    Strength Comprehensive (MMT)   Comment, General Manual Muscle Testing (MMT) Assessment L slightly stronger vs R UE. cont to monitor.    Muscle Tone Assessment   Muscle Tone Quick Adds No deficits were identified   Coordination   Upper Extremity Coordination Right UE impaired   Gross Motor Coordination appears WFL   Fine Motor Coordination slight R hand incoordination, reports will drop items at times.    Bed Mobility   Sit-Supine [place] (Bed Mobility) supervision   Transfer Skill: Bed to Chair/Chair to Bed   Bed-Chair [place] (Transfers) supervision   Sit-Stand Transfer   Sit-Stand [place] (Transfers) independent   Toilet Transfer   [place] Level (Toilet Transfer) supervision   Lower Body Dressing Assessment/Training   [place] Level (Lower Body Dressing) supervision   Grooming Assessment/Training   [place] Level (Grooming) supervision   Instrumental Activities of Daily Living (IADL)   Previous Responsibilities meal prep;housekeeping;laundry;shopping;medication management;finances;driving   Clinical Impression   Criteria for Skilled Therapeutic Interventions Met (OT) yes   OT  Diagnosis impaired I/safety with ADL/IADL's   OT Problem List-Impairments impacting ADL problems related to;cognition;coordination;strength   Assessment of Occupational Performance 3-5 Performance Deficits   Identified Performance Deficits impaired I and safety with shower transfer, grooming tasks, med mgmt, driving, etc   Planned Therapy Interventions (OT) ADL retraining;cognition;IADL retraining;transfer training;home program guidelines;progressive activity/exercise;fine motor coordination training;neuromuscular re-education   Clinical Decision Making Complexity (OT) moderate complexity   Therapy Frequency (OT) 4x/week  (until surgery)   Predicted Duration of Therapy 3 days   Risk & Benefits of therapy have been explained evaluation/treatment results reviewed;care plan/treatment goals reviewed;risks/benefits reviewed;current/potential barriers reviewed;participants voiced agreement with care plan;participants included;patient   OT Discharge Planning    OT Discharge Recommendation (DC Rec) Transitional Care Facility   OT Rationale for DC Rec pt is alert and oriented, however, noted some increased processing time needed and word finding difficulties, minimal impairment R hand coordination, safety and if discharges from [facility] prior to surgery recommend TCU to cont to work on cognition and R UE FMC.    OT Brief overview of current status  pt requires S for toilet transfer and dressing tasks and will need cont'd therapy for R hand coordiantion and cognition for pre and post surgery.    Total Evaluation Time (Minutes)   Total Evaluation Time (Minutes) 10

## 2024-01-12 NOTE — PROGRESS NOTES
Video-Visit Details    Type of service:  Video Visit    Video Start Time (time video started): 3:00pm    Video End Time (time video stopped): 3:15pm     Originating Location (pt. Location): Home    Distant Location (provider location): On-site- [facility]    Mode of Communication:  Video Conference via Mary Lou Spann MD PhD     2023  Olga Bailey  [phone] (home)   : 1945  MRN: [id number]  Neuro-Oncologist: Stephanie Baker MD  Neurosurgeon: Dr. Cummings  Attending Radiation Oncologist: Betsy Spann MD PhD    RADIATION ONCOLOGY FOLLOW UP    History of Present Illness:  Ms. 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 She was treated with radiotherapy alone to a total dose of 3500 cGy in 10 fx  completed 10/25/23.      The patient  was last seen at [facility] on 10/25/2023 and presents today for 2 month post-radiation follow up evaluation.      Interval History:     Recently was recommended to meet with hospice and these appointments are being set up.     Last MRI brain with and without contrast on 23:  IMPRESSION:  1. Decreased multifocal nodular enhancement surrounding the margins of  the left parietal resection cavity.  2. Persistent expansile T2 hyperintense signal surrounding the margins  of the resection cavity, nonspecific.  3. Expansile T2 hyperintense signal involving left thalamus appears  slightly worsened compared to the prior, nonspecific.  4. Background of confluent white matter T2 hyperintense signal likely  representing chronic small vessel ischemic change and/or post  treatment changes.  5. Small dural based enhancing mass along the right occipital lobe,  likely meningioma, unchanged.     Today, she specifically reports the following symptoms:    Brain ROS:  Headaches-   Denies  Seizures- Denies  Nausea/vomiting-  Denies  Changes  in cognition/behavior- improved fatigue since decreasing Vimpat dose decrease; not at baseline functioning compared to before recurrence   Vision/hearing changes- Denies  Other focal neuro deficits-  Denies    Review of Systems:  A complete 14 system review was negative except as noted in the HPI above.     Current systemic therapy: N/A  Anti-epileptic: Vimpat   Steroids: N/A    Current Outpatient Medications   Medication     albuterol (PROAIR HFA/PROVENTIL HFA/VENTOLIN HFA) 108 (90 Base) MCG/ACT inhaler     amLODIPine (NORVASC) 5 MG tablet     busPIRone HCl (BUSPAR) 30 MG tablet     FLUoxetine (PROZAC) 20 MG capsule     lacosamide (VIMPAT) 50 MG TABS tablet     levETIRAcetam (KEPPRA) 1000 MG tablet     levETIRAcetam (KEPPRA) 250 MG tablet     melatonin 1 MG TABS tablet     Midazolam 5 MG/0.1ML SOLN     multivitamin, therapeutic (THERA-VIT) TABS tablet     ondansetron (ZOFRAN) 4 MG tablet     pantoprazole (PROTONIX) 40 MG EC tablet     polyethylene glycol (MIRALAX) 17 GM/Dose powder     pramox-pe-glycerin-petrolatum (PREPARATION H) 1-0.25-14.4-15 % CREA cream     rivaroxaban ANTICOAGULANT (XARELTO ANTICOAGULANT) 20 MG TABS tablet     sodium chloride (OCEAN) 0.65 % nasal spray     No current facility-administered medications for this visit.        Allergies   Allergen Reactions     Pilocarpine Headache and Nausea and Vomiting     Chlorhexidine Itching and Rash     Aripiprazole Headache and Other (See Comments)     Insomnia, nightmares     Bacitracin Rash     Bactroban is effective; No difficulties     Erythromycin      intolerant.     Meperidine Hcl      intolerant only   Demerol     Chlorhexidine Gluconate Rash       Physical Exam:      ECOG: 3   KPS: 50    Vital Signs: No vital signs taken   Physical Exam (limited by video):    GENERAL: Healthy, alert and no distress  HEAD: Normocephalic, atraumatic  EYES: Eyes grossly normal to inspection.  No discharge or erythema, or obvious scleral/conjunctival  abnormalities.  NOSE: No discharge, normal appearance   RESP: No audible wheeze, cough, or visible cyanosis.  No visible retractions or increased work of breathing.    SKIN: Visible skin clear. No significant rash, abnormal pigmentation or lesions.  NEURO: Cranial nerves grossly intact.  Mentation and speech appropriate for age  EXTREMITIES: No obvious edema, normal appearing range of motion   PSYCH: Mentation appears normal, affect normal/bright, judgement and insight intact, normal speech and appearance well-groomed.     Last CBC with Diff  WBC   Date Value Ref Range Status   07/09/2021 3.8 (L) 4.0 - 11.0 10e9/L Final     WBC Count   Date Value Ref Range Status   11/17/2023 5.8 4.0 - 11.0 10e3/uL Final     RBC Count   Date Value Ref Range Status   11/17/2023 3.49 (L) 3.80 - 5.20 10e6/uL Final   07/09/2021 3.52 (L) 3.8 - 5.2 10e12/L Final     Hemoglobin   Date Value Ref Range Status   11/17/2023 10.3 (L) 11.7 - 15.7 g/dL Final   07/09/2021 10.7 (L) 11.7 - 15.7 g/dL Final     Hematocrit   Date Value Ref Range Status   11/17/2023 32.1 (L) 35.0 - 47.0 % Final   07/09/2021 33.9 (L) 35.0 - 47.0 % Final     MCV   Date Value Ref Range Status   11/17/2023 92 78 - 100 fL Final   07/09/2021 96 78 - 100 fl Final     MCH   Date Value Ref Range Status   11/17/2023 29.5 26.5 - 33.0 pg Final   07/09/2021 30.4 26.5 - 33.0 pg Final     MCHC   Date Value Ref Range Status   11/17/2023 32.1 31.5 - 36.5 g/dL Final   07/09/2021 31.6 31.5 - 36.5 g/dL Final     RDW   Date Value Ref Range Status   11/17/2023 13.7 10.0 - 15.0 % Final   07/09/2021 14.7 10.0 - 15.0 % Final     Platelet Count   Date Value Ref Range Status   11/17/2023 146 (L) 150 - 450 10e3/uL Final   07/09/2021 200 150 - 450 10e9/L Final     Diff Method   Date Value Ref Range Status   07/09/2021 Automated Method  Final     % Neutrophils   Date Value Ref Range Status   11/15/2023 69 % Final   07/09/2021 60.9 % Final     % Lymphocytes   Date Value Ref Range Status   11/15/2023  22 % Final   07/09/2021 9.7 % Final     % Monocytes   Date Value Ref Range Status   11/15/2023 8 % Final   07/09/2021 15.7 % Final     % Eosinophils   Date Value Ref Range Status   11/15/2023 1 % Final   07/09/2021 12.9 % Final     % Basophils   Date Value Ref Range Status   11/15/2023 0 % Final   07/09/2021 0.3 % Final     Absolute Neutrophil   Date Value Ref Range Status   07/09/2021 2.3 1.6 - 8.3 10e9/L Final     Absolute Neutrophils   Date Value Ref Range Status   02/03/2022 2.6 1.6 - 8.3 10e3/uL Final     Absolute Lymphocytes   Date Value Ref Range Status   02/03/2022 0.2 (L) 0.8 - 5.3 10e3/uL Final   07/09/2021 0.4 (L) 0.8 - 5.3 10e9/L Final     Absolute Monocytes   Date Value Ref Range Status   02/03/2022 0.5 0.0 - 1.3 10e3/uL Final   07/09/2021 0.6 0.0 - 1.3 10e9/L Final        Last Comprehensive Metabolic Panel:  Sodium   Date Value Ref Range Status   12/07/2023 140 135 - 145 mmol/L Final     Comment:     Reference intervals for this test were updated on 09/26/2023 to more accurately reflect our healthy population. There may be differences in the flagging of prior results with similar values performed with this method. Interpretation of those prior results can be made in the context of the updated reference intervals.    07/09/2021 139 133 - 144 mmol/L Final     Potassium   Date Value Ref Range Status   12/07/2023 3.6 3.4 - 5.3 mmol/L Final   01/13/2023 3.9 3.4 - 5.3 mmol/L Final   07/09/2021 3.7 3.4 - 5.3 mmol/L Final     Chloride   Date Value Ref Range Status   12/07/2023 102 98 - 107 mmol/L Final   01/13/2023 106 94 - 109 mmol/L Final   07/09/2021 109 94 - 109 mmol/L Final     Carbon Dioxide   Date Value Ref Range Status   07/09/2021 25 20 - 32 mmol/L Final     Carbon Dioxide (CO2)   Date Value Ref Range Status   12/07/2023 25 22 - 29 mmol/L Final   01/13/2023 28 20 - 32 mmol/L Final     Anion Gap   Date Value Ref Range Status   12/07/2023 13 7 - 15 mmol/L Final   01/13/2023 6 3 - 14 mmol/L Final    07/09/2021 5 3 - 14 mmol/L Final     Glucose   Date Value Ref Range Status   12/07/2023 74 70 - 99 mg/dL Final   01/13/2023 93 70 - 99 mg/dL Final   07/09/2021 92 70 - 99 mg/dL Final     GLUCOSE BY METER POCT   Date Value Ref Range Status   10/06/2023 116 (H) 70 - 99 mg/dL Final     Urea Nitrogen   Date Value Ref Range Status   12/07/2023 20.3 8.0 - 23.0 mg/dL Final   01/13/2023 16 7 - 30 mg/dL Final   07/09/2021 10 7 - 30 mg/dL Final     Creatinine   Date Value Ref Range Status   12/07/2023 0.60 0.51 - 0.95 mg/dL Final   07/09/2021 0.55 0.52 - 1.04 mg/dL Final     GFR Estimate   Date Value Ref Range Status   12/07/2023 >90 >60 mL/min/1.73m2 Final   07/09/2021 >90 >60 mL/min/[1.73_m2] Final     Comment:     Non  GFR Calc  Starting 12/18/2018, serum creatinine based estimated GFR (eGFR) will be   calculated using the Chronic Kidney Disease Epidemiology Collaboration   (CKD-EPI) equation.       Calcium   Date Value Ref Range Status   12/07/2023 9.6 8.8 - 10.2 mg/dL Final   07/09/2021 9.1 8.5 - 10.1 mg/dL Final        Imaging and Diagnostic Studies:   See HPI above    Assessment/Plan:  Ms. 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 She was treated with radiotherapy alone to a total dose of 3500 cGy in 10 fx  completed 10/25/23.    The patient presents without evidence of disease recurrence.  Her clinical status has declined since prior to her recurrence.     1)  Video visit with our team in 3 months with me. Olga may be exploring hospice options in the meantime so we will hold off on ordering imaging at this time.     All the patient's questions were answered to verbalized satisfaction. We instructed the patient to call with any questions or concerns in the interim.      The overall time I spent on direct patient care including self review of records, labs, and  radiographic studies, as well as, direct face-to-face interaction with the patient and coordination of care with other providers was 15 minutes.     Betsy Spann MD, PhD     Department of Radiation Oncology  [facility]

## 2024-01-15 ENCOUNTER — MEDICAL CORRESPONDENCE (OUTPATIENT)
Dept: HEALTH INFORMATION MANAGEMENT | Facility: CLINIC | Age: 79
End: 2024-01-15
Payer: MEDICARE

## 2024-01-16 ENCOUNTER — TRANSCRIBE ORDERS (OUTPATIENT)
Dept: OTHER | Age: 79
End: 2024-01-16

## 2024-01-16 DIAGNOSIS — L98.9 SKIN LESION: Primary | ICD-10-CM

## 2024-01-18 NOTE — PROGRESS NOTES
"[facility]    Infectious Disease Progress Note    Date of Service (when I saw the patient): 04/14/2021     Assessment & Plan   Olga Bailey is a 75 year old female who was admitted on 3/26/2021.     Impression:  1. 75 y.o with recent diagnosis of craniotomy and resection of glioblastoma multiforme.   2. Has been on very high dose steroid taper for the past month since the craniotomy. Not on any bactrim prophylaxis before admission.    3. Admitted with shortness of breath.   4. Found to have bilateral ground glass infiltrates on the imaging, very hypoxic.   5. COVID PCR negative x 2.   6. No leucocytosis, procal not elevated.      Recommendations:   High suspicion for PJP pneumonia. LDH high, high beta d glucan unable to confirm with the sputum studies as patient has been unable to provide any sputum   Steroids for PJP continue to taper to be off steroids in next 7- 10 days     Day 14/21- 26 septra slow improvement to be expected  but now much better   SWITCHed TO PO  continue as we taper off steroids.             Per neuro onc last note her cancer related plan was as listed in the note below, I would recommend follow up with Neuro onc after discharge.    I am copying and pasting Neuro onc last note below:   \" PLAN  -CANCER-DIRECTED THERAPY-  Will initiate chemoradiotherapy with temozolomide 75mg/m2 (140mg).   -Instructed to start temozolomide the evening before the start date of radiation and continue daily until the completion of radiation.   -Take temozolomide on an empty stomach, 30 minutes after Zofran dosing.  -Additional temozolomide teaching performed by RN/ pharmacy staff.      -Initial labs ordered; CBC with differential, CMP, Hepatitis B serologies; NR.  -Will continue weekly CBC and repeat CMP at follow-up.     -Medications prescribed;        -Zofran 4mg (1 to 2 tabs qHS 30 minutes prior to chemotherapy and then PRN nausea).       -For lymphopenia, prophylactic antibiotics " "are recommended during concurrent treatment. Will start Sulfamethoxazole-trimethoprim (Bactrim) 800 mg-160 mg; 1 tab orally Monday, Wednesday, and Friday for pneumocystis prophylaxis. If thrombocytopenia becomes an issue, can change to Dapsone, Atovaquone, or Pentamidine.        -Docusate 100 mg; 1 cap orally 3 times a day (stool softener for constipation)       -Senna 8.6 m tabs orally at bedtime (laxative as needed for constipation)     -STEROIDS-  -Continue to wean dexamethasone as tolerated;                           4mg daily ( - 3/28)                          2mg daily (3/29 - )                          2mg on , , , then stop.   -Dose may increase while undergoing radiation.\"          Robyn Branham MD    Interval History   O2 much better  No fever   No new micro   No new complaints       Physical Exam   Temp: 98.3  F (36.8  C) Temp src: Oral BP: 112/78 Pulse: 91   Resp: 16 SpO2: 92 % O2 Device: Nasal cannula Oxygen Delivery: 1/2 LPM  Vitals:    04/10/21 0600 21 0700 21 0656   Weight: 70.4 kg (155 lb 3.3 oz) 68.9 kg (151 lb 14.4 oz) 63.8 kg (140 lb 10.5 oz)     Vital Signs with Ranges  Temp:  [97.7  F (36.5  C)-98.6  F (37  C)] 98.3  F (36.8  C)  Pulse:  [] 91  Resp:  [16-18] 16  BP: (112-124)/(70-82) 112/78  SpO2:  [92 %-96 %] 92 %    Constitutional: Awake, alert  Lungs: diminished bilateral   Cardiovascular: Regular rate and rhythm, normal S1 and S2, and no murmur noted  Abdomen: Normal bowel sounds, soft, non-distended, non-tender  Skin: No rashes, no cyanosis, no edema  Other:    Medications       amLODIPine  10 mg Oral Daily     busPIRone HCl  30 mg Oral BID     enoxaparin ANTICOAGULANT  70 mg Subcutaneous Q12H     famotidine  20 mg Oral BID     FLUoxetine  80 mg Oral Daily     levalbuterol  1.25 mg Nebulization Q4H While awake     metoprolol tartrate  12.5 mg Oral BID     nystatin  500,000 Units Swish & Spit 4x Daily     predniSONE  30 mg Oral Daily     sodium " chloride (PF)  3 mL Intracatheter Q8H     sulfamethoxazole-trimethoprim  2 tablet Oral TID       Data   All microbiology laboratory data reviewed.  Recent Labs   Lab Test 04/14/21  0548 04/13/21  0558 04/12/21  0630   WBC 10.2 11.7* 13.3*   HGB 8.1* 8.2* 8.0*   HCT 24.6* 25.8* 24.1*   MCV 88 88 88    175 178     Recent Labs   Lab Test 04/13/21  0558 04/12/21  0630 04/10/21  0640   CR 0.64 0.56 0.66     No lab results found.  Recent Labs   Lab Test 03/29/21 1951 03/29/21  1946   CULT No growth No growth       Attestation:  Total time on the floor involved in the patient's care: 35 minutes. Total time spent in counseling/care coordination: >50%     Stretcher

## 2024-01-19 ENCOUNTER — TRANSCRIBE ORDERS (OUTPATIENT)
Dept: OTHER | Age: 79
End: 2024-01-19

## 2024-01-19 ENCOUNTER — TELEPHONE (OUTPATIENT)
Dept: FAMILY MEDICINE | Facility: CLINIC | Age: 79
End: 2024-01-19
Payer: MEDICARE

## 2024-01-19 DIAGNOSIS — L98.9 SKIN LESION: Primary | ICD-10-CM

## 2024-01-19 NOTE — TELEPHONE ENCOUNTER
This encounter is being sent to inform the clinic that this patient has an urgent referral from Yudy Alejandre for the diagnoses of Skin Lesion and has requested that this patient be seen within 3-5 days. Based on the availability of our provider(s), we are unable to accommodate this request.    Were all sites offered this patient?  Yes. Patient is ok to be seen at [facility] and [facility].    Does scheduling algorithm request to schedule next available?  Patient has been scheduled for the first available opening with Milly Rivas PA-C on 7/2/2024.  We have informed the patient that the clinic will review their referral and reach out if a sooner appointment is medically necessary.

## 2024-01-22 NOTE — TELEPHONE ENCOUNTER
Patient Contact    Attempt # 1    Was call answered?  No.  Left message on voicemail with information to call nurse back at [phone].    No consent to communicate in chart for spouse Fahad. Only consent is for daughter La Nena.    Shraddha MCKOY RN  [facility]  [phone]

## 2024-01-23 NOTE — TELEPHONE ENCOUNTER
Left message on answering machine for patient to call back to schedule an add on appointment  (Do not need CTC to schedule appointment).    Thank you,    Ainsley THOMASRN BSN  [facility]- [phone]

## 2024-01-23 NOTE — TELEPHONE ENCOUNTER
[facility] Call Center    Phone Message    May a detailed message be left on voicemail: yes     Reason for Call: Other: Pts  returning call to schedule sooner appt. Please call  back. Thanks      Action Taken: Other: derm    Travel Screening: Not Applicable

## 2024-01-23 NOTE — TELEPHONE ENCOUNTER
Appointment scheduled.  Thank you,    Ainsley THOMASRN BSN  [facility]- [phone]

## 2024-02-01 ENCOUNTER — OFFICE VISIT (OUTPATIENT)
Dept: FAMILY MEDICINE | Facility: CLINIC | Age: 79
End: 2024-02-01
Payer: MEDICARE

## 2024-02-01 DIAGNOSIS — D48.5 NEOPLASM OF UNCERTAIN BEHAVIOR OF SKIN: Primary | ICD-10-CM

## 2024-02-01 DIAGNOSIS — L98.9 SKIN LESION: ICD-10-CM

## 2024-02-01 PROCEDURE — 88305 TISSUE EXAM BY PATHOLOGIST: CPT | Mod: 26 | Performed by: DERMATOLOGY

## 2024-02-01 PROCEDURE — 11102 TANGNTL BX SKIN SINGLE LES: CPT | Performed by: PHYSICIAN ASSISTANT
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
## 2024-02-01 NOTE — PATIENT INSTRUCTIONS
Patient Education       Proper skin care from [facility]:    -Eliminate harsh soaps as they strip the natural oils from the skin, often resulting in dry itchy skin ( i.e. Dial, Zest, Romanian Spring)  -Use mild soaps such as Cetaphil or Dove Sensitive Skin in the shower. You do not need to use soap on arms, legs, and trunk every time you shower unless visibly soiled.   -Avoid hot or cold showers.  -After showering, lightly dry off and apply moisturizing within 2-3 minutes. This will help trap moisture in the skin.   -Aggressive use of a moisturizer at least 1-2 times a day to the entire body (including -Vanicream, Cetaphil, Aquaphor or Cerave) and moisturize hands after every washing.  -We recommend using moisturizers that come in a tub that needs to be scooped out, not a pump. This has more of an oil base. It will hold moisture in your skin much better than a water base moisturizer. The above recommended are non-pore clogging.      Wear a sunscreen with at least SPF 30 on your face, ears, neck and V of the chest daily. Wear sunscreen on other areas of the body if those areas are exposed to the sun throughout the day. Sunscreens can contain physical and/or chemical blockers. Physical blockers are less likely to clog pores, these include zinc oxide and titanium dioxide. Reapply every two hour and after swimming.     Sunscreen examples: https://www.ewg.org/sunscreen/    UV radiation  UVA radiation remains constant throughout the day and throughout the year. It is a longer wavelength than UVB and therefore penetrates deeper into the skin leading to immediate and delayed tanning, photoaging, and skin cancer. 70-80% of UVA and UVB radiation occurs between the hours of 10am-2pm.  UVB radiation  UVB radiation causes the most harmful effects and is more significant during the summer months. However, snow and ice can reflect UVB radiation leading to skin damage during the winter months as well. UVB radiation is  responsible for tanning, burning, inflammation, delayed erythema (pinkness), pigmentation (brown spots), and skin cancer.     I recommend self monthly full body exams and yearly full body exams with a dermatology provider. If you develop a new or changing lesion please follow up for examination. Most skin cancers are pink and scaly or pink and pearly. However, we do see blue/brown/black skin cancers.  Consider the ABCDEs of melanoma when giving yourself your monthly full body exam ( don't forget the groin, buttocks, feet, toes, etc). A-asymmetry, B-borders, C-color, D-diameter, E-elevation or evolving. If you see any of these changes please follow up in clinic. If you cannot see your back I recommend purchasing a hand held mirror to use with a larger wall mirror.       Checking for Skin Cancer  You can find cancer early by checking your skin each month. There are 3 kinds of skin cancer. They are melanoma, basal cell carcinoma, and squamous cell carcinoma. Doing monthly skin checks is the best way to find new marks or skin changes. Follow the instructions below for checking your skin.   The ABCDEs of checking moles for melanoma   Check your moles or growths for signs of melanoma using ABCDE:   Asymmetry: the sides of the mole or growth don t match  Border: the edges are ragged, notched, or blurred  Color: the color within the mole or growth varies  Diameter: the mole or growth is larger than 6 mm (size of a pencil eraser)  Evolving: the size, shape, or color of the mole or growth is changing (evolving is not shown in the images below)    Checking for other types of skin cancer  Basal cell carcinoma or squamous cell carcinoma have symptoms such as:     A spot or mole that looks different from all other marks on your skin  Changes in how an area feels, such as itching, tenderness, or pain  Changes in the skin's surface, such as oozing, bleeding, or scaliness  A sore that does not heal  New swelling or redness beyond  the border of a mole    Who s at risk?  Anyone can get skin cancer. But you are at greater risk if you have:   Fair skin, light-colored hair, or light-colored eyes  Many moles or abnormal moles on your skin  A history of sunburns from sunlight or tanning beds  A family history of skin cancer  A history of exposure to radiation or chemicals  A weakened immune system  If you have had skin cancer in the past, you are at risk for recurring skin cancer.   How to check your skin  Do your monthly skin checkups in front of a full-length mirror. Check all parts of your body, including your:   Head (ears, face, neck, and scalp)  Torso (front, back, and sides)  Arms (tops, undersides, upper, and lower armpits)  Hands (palms, backs, and fingers, including under the nails)  Buttocks and genitals  Legs (front, back, and sides)  Feet (tops, soles, toes, including under the nails, and between toes)  If you have a lot of moles, take digital photos of them each month. Make sure to take photos both up close and from a distance. These can help you see if any moles change over time.   Most skin changes are not cancer. But if you see any changes in your skin, call your doctor right away. Only he or she can diagnose a problem. If you have skin cancer, seeing your doctor can be the first step toward getting the treatment that could save your life.   [employer] last reviewed this educational content on 4/1/2019 2000-2020 [employer]. [address] [place] [address], [place] [address]. All rights reserved. This information is not intended as a substitute for professional medical care. Always follow your healthcare professional's instructions.         Wound Care After a Biopsy    What is a skin biopsy?  A skin biopsy allows the doctor to examine a very small piece of tissue under the microscope to determine the diagnosis and the best treatment for the skin condition. A local anesthetic (numbing medicine)  is injected with a very small  needle into the skin area to be tested. A small piece of skin is taken from the area. Sometimes a suture (stitch) is used.     What are the risks of a skin biopsy?  I will experience scar, bleeding, swelling, pain, crusting and redness. I may experience incomplete removal or recurrence. Risks of this procedure are excessive bleeding, bruising, infection, nerve damage, numbness, thick (hypertrophic or keloidal) scar and non-diagnostic biopsy.    How should I care for my wound for the first 24 hours?  Keep the wound dry and covered for 24 hours  If it bleeds, hold direct pressure on the area for 15 minutes. If bleeding does not stop then go to the emergency room  Avoid strenuous exercise the first 1-2 days or as your doctor instructs you    How should I care for the wound after 24 hours?  After 24 hours, remove the bandage  You may bathe or shower as normal  If you had a scalp biopsy, you can shampoo as usual and can use shower water to clean the biopsy site daily  Clean the wound twice a day with gentle soap and water  Do not scrub, be gentle  Apply white petroleum/Vaseline after cleaning the wound with a cotton swab or a clean finger, and keep the site covered with a Bandaid /bandage. Bandages are not necessary with a scalp biopsy  If you are unable to cover the site with a Bandaid /bandage, re-apply ointment 2-3 times a day to keep the site moist. Moisture will help with healing  Avoid strenuous activity for first 1-2 days  Avoid lakes, rivers, pools, and oceans until the stitches are removed or the site is healed    How do I clean my wound?  Wash hands thoroughly with soap or use hand  before all wound care  Clean the wound with gentle soap and water  Apply white petroleum/Vaseline  to wound after it is clean  Replace the Bandaid /bandage to keep the wound covered for the first few days or as instructed by your doctor  If you had a scalp biopsy, warm shower water to the area on a daily basis should  suffice    What should I use to clean my wound?   Cotton-tipped applicators (Qtips )  White petroleum jelly (Vaseline ). Use a clean new container and use Q-tips to apply.  Bandaids   as needed  Gentle soap     How should I care for my wound long term?  Do not get your wound dirty  Keep up with wound care for one week or until the area is healed.  A small scab will form and fall off by itself when the area is completely healed. The area will be red and will become pink in color as it heals. Sun protection is very important for how your scar will turn out. Sunscreen with an SPF 30 or greater is recommended once the area is healed.  If you have stitches, stitches need to be removed in 14 days. You may return to our clinic for this or you may have it done locally at your doctor s office.  You should have some soreness but it should be mild and slowly go away over several days. Talk to your doctor about using tylenol for pain,    When should I call my doctor?  If you have increased:   Pain or swelling  Pus or drainage (clear or slightly yellow drainage is ok)  Temperature over 100F  Spreading redness or warmth around wound    When will I hear about my results?  The biopsy results can take 2-3 weeks to come back. The clinic will call you with the results, send you a Ampriust message, or have you schedule a follow-up clinic or phone time to discuss the results. Contact our clinics if you do not hear from us in 3 weeks.     Who should I call with questions?  [facility]: [phone]   [facility]: [phone]  For urgent needs outside of business hours call the [facility] at [phone] and ask for the dermatology resident on call

## 2024-02-01 NOTE — NURSING NOTE
Patient is accompanied by  who is POA for medical    Thank you,    Ainsley THOMASRN BSN  [facility]- [phone]
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
## 2024-02-05 ENCOUNTER — TELEPHONE (OUTPATIENT)
Dept: FAMILY MEDICINE | Facility: CLINIC | Age: 79
End: 2024-02-05
Payer: MEDICARE

## 2024-02-05 DIAGNOSIS — C44.529 SQUAMOUS CELL CARCINOMA OF SKIN OF CHEST: Primary | ICD-10-CM

## 2024-02-05 LAB
PATH REPORT.COMMENTS IMP SPEC: ABNORMAL
PATH REPORT.COMMENTS IMP SPEC: ABNORMAL
PATH REPORT.COMMENTS IMP SPEC: YES
PATH REPORT.FINAL DX SPEC: ABNORMAL
PATH REPORT.GROSS SPEC: ABNORMAL
PATH REPORT.MICROSCOPIC SPEC OTHER STN: ABNORMAL
PATH REPORT.RELEVANT HX SPEC: ABNORMAL

## 2024-02-05 NOTE — TELEPHONE ENCOUNTER
Patient Contact    Attempt # 1    Was call answered?  No.  Left message on voicemail with information to call nurse back at [phone].     CTC on file is for daughter La Nena and not .  If wants  added will need new CTC signed.    Shraddha MCKOY RN  [facility] [place]  [phone]

## 2024-02-05 NOTE — TELEPHONE ENCOUNTER
----- Message from Yolanda Caldwell PA-C sent at 2/5/2024  1:00 PM CST -----  SCC - this will need excision vs MOHS - please refer to [facility] for removal. Patient is largely nonverbal, so you will be speaking with her  who is a retired general surgeon. He though this was likely a basal cell skin cancer, so he will be interested to find it was a squamous cell skin cancer. We will treat it with excision either way. He can discuss MOHS vs WLE with the surgeon.     Brit    Right upper chest:  - Squamous cell carcinoma, well-differentiated

## 2024-02-05 NOTE — TELEPHONE ENCOUNTER
Daughter La Nena called back and was given message from Yolanda Caldwell below about biopsy results.  Explained and answered all questions about excision. La Nena would like to go to [place] [facility] for procedure.    Referral to Georgina Dawson in chart.    La Nena is the only person listed on the Consent to Communicate form.  Please call to schedule.    Shraddha MCKOY RN  [facility] [place]  [phone]

## 2024-02-06 ENCOUNTER — TELEPHONE (OUTPATIENT)
Dept: DERMATOLOGY | Facility: CLINIC | Age: 79
End: 2024-02-06
Payer: MEDICARE

## 2024-02-06 NOTE — TELEPHONE ENCOUNTER
Left patient a voicemail to schedule a consult & mohs for   SCC right upper chest     with Dr. Rowe or Dr. Baxter. Provided my direct phone number.    Maureen Lizarraga on 2/6/2024 at 9:28 AM

## 2024-02-08 NOTE — TELEPHONE ENCOUNTER
Pt's  called in asking to schedule with Derm Surg regarding the SCC on Olga's chest.    We scheduled a telephone visit for the consultation.     Pt lives in a nursing home and is wheelchair bound. Pt does not speak. Pt's  is unable to get her from the nursing home to the  office in time for an early morning appt. The earliest that he would be able to get her to the office for a procedure is 10:30 am.     I confirmed that I would route this to Dr. Rowe and the  team to advise further.     Pt's  also asked me to read off the pathology report, which I told him I can not do as a non-clincal staff member. I believe we are also missing the needed consent to communicate forms to share medical info with the , although we did not discuss that on this call.     Maureen Lizarraga, Procedure  2/8/2024 11:24 AM

## 2024-02-12 NOTE — TELEPHONE ENCOUNTER
Pt calling again about the below. Please follow up.     Maureen Lizarraga, Procedure  2/12/2024 10:23 AM

## 2024-02-13 NOTE — TELEPHONE ENCOUNTER
I am unable to locate any power of  paperwork.  I left a voicemail message for patient's daughter, La Nena, to call back.    Obtain POA paperwork.  Once obtained and reviewed, call POA and schedule for Mohs at 10:30 am.  Update Kay WEEKS with date.    Neeru Husain RN

## 2024-02-15 NOTE — TELEPHONE ENCOUNTER
2nd attempt to reach pt's daughter (consent on file), no answer. Left message for her to return our call at [phone].    Flavia Fuchs RN on 2/15/2024 at 8:50 AM

## 2024-02-16 ENCOUNTER — TELEPHONE (OUTPATIENT)
Dept: DERMATOLOGY | Facility: CLINIC | Age: 79
End: 2024-02-16
Payer: MEDICARE

## 2024-02-16 ENCOUNTER — DOCUMENTATION ONLY (OUTPATIENT)
Dept: OTHER | Facility: CLINIC | Age: 79
End: 2024-02-16
Payer: MEDICARE

## 2024-02-16 NOTE — TELEPHONE ENCOUNTER
La Nena returned call.  She stated that Olga's ex-, Fahad, is her legal power of  and makes her medical decisions.    La Nena is estranged from her father Fahad and does not have a copy of the paperwork.  I left a message for Fahad to call back.  Please have him send us a copy of the POA paperwork so we can update her chart.    Neeru Husain RN

## 2024-02-20 NOTE — TELEPHONE ENCOUNTER
I spoke with Fahad.  He reports that he is her POA and legally appointed health care decision maker.  He will bring a copy of the forms with him to the appointment.    Excision/Mohs previsit information                                                    Diagnosis: squamous cell carcinoma  Site(s): right upper chest    Over the counter Chlorhexidine surgical soap to wash all skin below the belly button twice before surgery should be recommended for the following:  - Surgical sites below the waist  - Immunosuppressed  - Previous surgical site infection  - Anticipated wound care challenges    Medication & Allergy Information                                                      Review and update allergy and medication list.    Do you take the following medications:  Coumadin, Eliquis, Pradaxa, Xarelto:  YES, Xarelto    Past Medical History                                                    Do you currently or have you previously had any of the following conditions:    Hepatitis:  NO  HIV/AIDS:  NO  Prolonged bleeding or bleeding disorder:  YES, on Xarelto  Pacemaker or Defibrillator:  NO.    History of artificial or heart valve replacement:  NO  Endocarditis (inflammation of the inner lining of the heart's chambers and valves):  NO  Have you ever had a prosthetic joint infection:  NO  Pregnant or Breastfeeding:  N/A  Mobility device (wheelchair, transfer difficulty): YES, requires Uyen lift.  Fahad reports that she would be comfortable staying in her wheelchair vs in a reclined position on the bed.    Important Reminders:                                                      Ok to take all of their medications as prescribed  Patients can eat, no need to be fasting  Patient will not be able to get the site wet for 48 hrs  No submerging wound in standing water (lake, pool, bathtub, hot tub) for 2 weeks  No physical activity for 48 hrs (further restrictions will be discussed by MD at time of visit)    Neeru Husain RN

## 2024-02-28 NOTE — ORAL ONC MGMT
Oral Chemotherapy Monitoring Program  Lab Follow Up    Reviewed lab results from 8/2/21. Spoke with La Nena and reviewed that labs are trending up and it is ok to proceed with metronomic temozolomide. La Nena reports that she will move her mom to [place] in [place] on 8/4/21.     ORAL CHEMOTHERAPY 3/26/2021 6/22/2021 8/2/2021   Assessment Type New Teach New Teach Lab Monitoring   Diagnosis Code Brain Cancer - Glioblastoma Brain Cancer - Glioblastoma Brain Cancer - Glioblastoma   Providers Samuel Baker   Clinic Name/Location [facility]   Drug Name Temodar (temozolomide) Temodar (temozolomide) Temodar (temozolomide)   Dose 140 mg 250 mg 100 mg   Current Schedule Daily Daily Daily   Cycle Details Continuous for 42 days during XRT 5 days on, 23 days off Continuous   Start Date of Last Cycle - - 7/23/2021   Planned next cycle start date - 6/24/2021 -   Any new drug interactions? - No -   Is the dose as ordered appropriate for the patient? - Yes -       Labs:  _  Result Component Current Result Ref Range   Sodium 140 (8/2/2021) 133 - 144 mmol/L     _  Result Component Current Result Ref Range   Potassium 3.6 (8/2/2021) 3.4 - 5.3 mmol/L     _  Result Component Current Result Ref Range   Calcium 9.5 (8/2/2021) 8.5 - 10.1 mg/dL     _  Result Component Current Result Ref Range   Magnesium 1.8 (7/8/2021) 1.6 - 2.3 mg/dL     No results found for Phos within last 30 days.     _  Result Component Current Result Ref Range   Albumin 3.5 (8/2/2021) 3.4 - 5.0 g/dL     _  Result Component Current Result Ref Range   Urea Nitrogen 11 (8/2/2021) 7 - 30 mg/dL     _  Result Component Current Result Ref Range   Creatinine 0.79 (8/2/2021) 0.52 - 1.04 mg/dL     _  Result Component Current Result Ref Range   AST 18 (8/2/2021) 0 - 45 U/L     _  Result Component Current Result Ref Range   ALT 20 (8/2/2021) 0 - 50 U/L     _  Result Component Current Result Ref Range   Bilirubin Total 0.6 (8/2/2021) 0.2 - 1.3 mg/dL      _  Result Component Current Result Ref Range   WBC Count 3.0 (L) (8/2/2021) 4.0 - 11.0 10e3/uL     _  Result Component Current Result Ref Range   Hemoglobin 10.9 (L) (8/2/2021) 11.7 - 15.7 g/dL     _  Result Component Current Result Ref Range   Platelet Count 383 (8/2/2021) 150 - 450 10e3/uL     _  Result Component Current Result Ref Range   Absolute Neutrophils 2.2 (8/2/2021) 1.6 - 8.3 10e3/uL       Assessment & Plan:  No concerning abnormalities. Ok to proceed with metronomic temozolomide. Patient will be transitioning to [facility] in [place] on 8/4/21. She is going to speak with both [facility] staff and [facility] staff and see if she can bring temozolomide from [facility] to [facility] or if one or the other will not authorize the transfer of that medication. La Nena will update pharmacy on 8/3 about the status of her mom's temozolomide. Pharmacy to then call [facility] to do teach on temozolomide.    Questions answered to patient's satisfaction.    Follow-Up:  8/4 call [facility] to do teach. Make sure they have zofran and temozolomide.     Brigid Mcintyre PharmD  August 2, 2021       [Post Op: _________] : a [unfilled] post op visit [FreeTextEntry1] : Dr. Benavides / Cyn[place]

## 2024-02-29 ENCOUNTER — VIRTUAL VISIT (OUTPATIENT)
Dept: DERMATOLOGY | Facility: CLINIC | Age: 79
End: 2024-02-29
Payer: MEDICARE

## 2024-02-29 DIAGNOSIS — C44.529 SQUAMOUS CELL CARCINOMA OF SKIN OF CHEST: ICD-10-CM

## 2024-02-29 PROCEDURE — 99442 PR PHYSICIAN TELEPHONE EVALUATION 11-20 MIN: CPT | Mod: GC | Performed by: DERMATOLOGY

## 2024-02-29 NOTE — NURSING NOTE
Olga Bailey's goals for this visit include:   Chief Complaint   Patient presents with    Consult     SCC right upper chest       She requests these members of her care team be copied on today's visit information:     PCP: [facility]    Referring Provider:  Yolanda Caldwell PA-C  [address] [place] [id number]    There were no vitals taken for this visit.    Do you need any medication refills at today's visit?         Rena Brady EMT      Teledermatology Nurse Call Patients:     Are you in the [facility] at the time of the encounter? yes    Today's visit will be billed to you and your insurance.    A teledermatology visit is not as thorough as an in-person visit and the quality of the photograph sent may not be of the same quality as that taken by the dermatology clinic.

## 2024-02-29 NOTE — PROGRESS NOTES
[facility] Dermatology Note  Encounter Date: Feb 29, 2024  Store-and-Forward and Telephone. Location of teledermatologist: [facility].  Start time: 1306. End time: 1319.    Dermatologic Surgery Telemedicine Consult Note    Dermatology Problem List:  1. 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  - Skin cancer(s): SCC  - Location(s): right upper chest  - spoke with ex- Fahad who reports he is patient's POA  - patient is wheelchair bound. Has a hx of glioblastoma s/p radiation 5/2021 with recurrence s/p radiation again 10/2023 which left her quite debilitated  - Fahad reports that there is another exophytic lesion on the chest that he is concerned may be cancer   - wondering what the chances of the cancer having spread to the lymph nodes are  - on Xarelto  - no other concerns today    Objective:   Skin: Focused examination of the R chest within the teledermatology photograph(s) on 2/1/24 was performed.   - roughly 2.5 cm exophytic crusted lesion with surrounding erythema on R upper chest at clavicle     Path report:     Right upper chest:  - Squamous cell carcinoma, well-differentiated - (see description)    Assessment and Plan:     1. Plan for Mohs micrographic surgery for skin cancer(s) above:  *Review lab result(s): Dermpath report   - We discussed the nature of the diagnosis/condition above. We discussed the treatment options, including the risks benefits and expectations of these options. We recommend micrographic surgery as the most effective and most tissue sparing option for treatment, and the patient agrees to proceed with this.  The  patient is aware of the risks, benefits and expectations of this procedure. The patient will be scheduled for this procedure, if not already done so.  - We anticipate the following closure type: Linear closure, such as complex linear closure (CLC)  - discussed that based on guidelines at this point we don't recommend SLNB or imaging to rule out lymph node metastasis. Discussed that if the debulk at time of mohs shows significant PNI or other concerning features then this might we something we'd recommend       The patient was discussed with and evaluated by attending physician, Nico Rowe DO.    Nafisa Ricci MD  Micrographic Surgery and Dermatologic Oncology (MSDO) Fellow, PGY-5    Staff Physician Comments:   I saw the photos and evaluated the patient with the fellow (Dr. Nafisa Ricci) and I agree with the assessment and plan. I was present for the key portions of the telephone call.    Nico Rowe DO    Department of Dermatology  [facility]: Phone: [phone], Fax:[phone]  [facility]: Phone: [phone], Fax: [phone]

## 2024-02-29 NOTE — LETTER
2/29/2024         RE: Olga Bailey  [address]  [facility] [address]        Dear Colleague,    Thank you for referring your patient, Olga Bailey, to the [facility]. Please see a copy of my visit note below.    [facility] Dermatology Note  Encounter Date: Feb 29, 2024  Store-and-Forward and Telephone. Location of teledermatologist: [facility].  Start time: 1306. End time: 1319.    Dermatologic Surgery Telemedicine Consult Note    Dermatology Problem List:  1. 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  - Skin cancer(s): SCC  - Location(s): right upper chest  - spoke with ex- Fahad who reports he is patient's POA  - patient is wheelchair bound. Has a hx of glioblastoma s/p radiation 5/2021 with recurrence s/p radiation again 10/2023 which left her quite debilitated  - Fahad reports that there is another exophytic lesion on the chest that he is concerned may be cancer   - wondering what the chances of the cancer having spread to the lymph nodes are  - on Xarelto  - no other concerns today    Objective:   Skin: Focused examination of the R chest within the teledermatology photograph(s) on 2/1/24 was performed.   - roughly 2.5 cm exophytic crusted lesion with surrounding erythema on R upper chest at clavicle     Path report:     Right upper chest:  - Squamous cell carcinoma, well-differentiated - (see description)    Assessment and Plan:     1. Plan for Mohs micrographic surgery for skin cancer(s) above:  *Review lab result(s): Dermpath report   - We discussed the nature of the diagnosis/condition above. We  discussed the treatment options, including the risks benefits and expectations of these options. We recommend micrographic surgery as the most effective and most tissue sparing option for treatment, and the patient agrees to proceed with this.  The patient is aware of the risks, benefits and expectations of this procedure. The patient will be scheduled for this procedure, if not already done so.  - We anticipate the following closure type: Linear closure, such as complex linear closure (CLC)  - discussed that based on guidelines at this point we don't recommend SLNB or imaging to rule out lymph node metastasis. Discussed that if the debulk at time of mohs shows significant PNI or other concerning features then this might we something we'd recommend       The patient was discussed with and evaluated by attending physician, Nico Rowe DO.    Nafisa Ricci MD  Micrographic Surgery and Dermatologic Oncology (MSDO) Fellow, PGY-5    Staff Physician Comments:   I saw the photos and evaluated the patient with the fellow (Dr. Nafisa Ricci) and I agree with the assessment and plan. I was present for the key portions of the telephone call.    Nico Rowe DO    Department of Dermatology  [facility]: Phone: [phone], Fax:[phone]  [facility]: Phone: [phone], Fax: [phone]      Again, thank you for allowing me to participate in the care of your patient.        Sincerely,        Nico Rowe MD

## 2024-03-07 ENCOUNTER — OFFICE VISIT (OUTPATIENT)
Dept: DERMATOLOGY | Facility: CLINIC | Age: 79
End: 2024-03-07
Payer: MEDICARE

## 2024-03-07 DIAGNOSIS — D48.5 NEOPLASM OF UNCERTAIN BEHAVIOR OF SKIN: ICD-10-CM

## 2024-03-07 DIAGNOSIS — C44.529 SQUAMOUS CELL CARCINOMA OF SKIN OF CHEST: Primary | ICD-10-CM

## 2024-03-07 PROCEDURE — 11603 EXC TR-EXT MAL+MARG 2.1-3 CM: CPT | Mod: XS | Performed by: DERMATOLOGY

## 2024-03-07 PROCEDURE — 17313 MOHS 1 STAGE T/A/L: CPT | Mod: GC | Performed by: DERMATOLOGY

## 2024-03-07 PROCEDURE — 88305 TISSUE EXAM BY PATHOLOGIST: CPT | Mod: XE | Performed by: DERMATOLOGY

## 2024-03-07 PROCEDURE — 12034 INTMD RPR S/TR/EXT 7.6-12.5: CPT | Mod: GC | Performed by: DERMATOLOGY

## 2024-03-07 NOTE — PROGRESS NOTES
[facility] Dermatologic Surgery Clinic [place] Procedure Note    Dermatology Problem List:    1. 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  ______________________________________________________________    Date of Service:  Mar 7, 2024  Surgery: Mohs micrographic surgery    Case 1  Repair Type: Intermediate  Repair Size: 7.0 cm  Suture Material: monocryl 4-0, 5-0 fast gut   Tumor Type: SCC - Squamous cell carcinoma  Location: Right upper chest  Derm-Path Accession #: [id number]  PreOp Size: 2.8 x 2.1 cm  PostOp Size: 5.2 x 4.5 cm  Mohs Accession #: [id number]  Level of Defect: fascia      Procedure:  We discussed the principles of treatment and most likely complications including scarring, bleeding, infection, swelling, pain, crusting, nerve damage, large wound,  incomplete excision, wound dehiscence,  nerve damage, recurrence, and a second procedure may be recommended to obtain the best cosmetic or functional result.    Informed consent was obtained and the patient underwent the procedure as follows:  The patient was placed Other (comments) (Sitting) on the operating table.  The cancer was identified, outlined with a marker, and verified by the patient.  The entire surgical field was prepped with Other (comments) (PVP swab).  The surgical site was anesthetized using Lidocaine 1% with epi 1:100,000.      The area of clinically apparent tumor was debulked. The layer of tissue was then surgically excised using a #15 blade and was then transferred onto a specimen sheet maintaining the orientation of the specimen. Hemostasis was obtained using monopolar electrodessication. The wound site was then covered with a dressing while the tissue samples were processed for examination.    The excised tissue was transported to the  Mohs histology laboratory maintaining the tissue orientation.  The tissue specimen was relaxed so that the entire surgical margin was in a a single horizontal plane for sectioning and inked for precise mapping.  A precise reference map was drawn to reflect the sectioning of the specimen, colored inking of the margins, and orientation on the patient. The tissue was processed using horizontal sectioning of the base and continuous peripheral margins.  The histopathologic sections were reviewed in conjunction with the reference map.    Total blocks: 4    Total slides:  7    There were no cancer cells visualized on examination, therefore Mohs surgery was complete.    Reconstruction: Intermediate Linear Closure      The patient was taken to the operative suite and placed supine on the operating room table.  The defect was identified.  Appropriate markings were made with a marking pen to plan the repair.  The area was infiltrated with Lidocaine 1% with epi 1:100,000 and prepped with Hibiclens and draped with sterile towels.     The wound was debeveled and undermined widely.  Cones were excised within relaxed skin tension lines on both sides of the defect.  Hemostasis was obtained using electrofulguration.  Deep subcutaneous tissues were then approximated using 4-0 Monocryl buried vertical mattress sutures.  The wound edges were then approximated additional buried sutures were placed in a similar fashion where needed.  Percutaneous simple running 5-0 fast gut sutures were carefully placed for maximum eversion and meticulous approximation.    Repair Size: 7.0 cm    The wound was cleansed with saline and ointment was applied along the wound surface.     A sterile pressure dressing was applied.  Wound care instructions were given verbally and in writing.  The patient left the operating suite in stable condition.      Dr. Nafisa Ricci (Mohs micrographic surgery fellow) performed the Mohs micrographic surgery and  reconstruction under the direct supervision of Nico Rowe DO, who was present for the entire micrographic surgery and key portions of the reconstruction, and always immediately available.      Scribe Disclosure:   I, ONI ENRIQUEZ, am serving as a scribe; to document services personally performed by Nico Rowe MD -based on data collection and the provider's statements to me.     Nafisa Ricci MD  Micrographic Surgery and Dermatologic Oncology (MSDO) Fellow, PGY-5    Staff Physician Comments:   I saw and evaluated the patient with the Mohs Surgery Fellow (Dr. Nafisa Ricci) and I agree with the assessment and plan and the above description of the procedure as documented by the scribe. I was present for the key portions of the procedure and entire exam. I was immediately available in the clinic throughout the procedures.     Nico Rowe DO    Department of Dermatology  [facility]: Phone: [phone], Fax:[phone]  [facility]: Phone: [phone], Fax: [phone]    Care and Laboratory Testing Performed at:  [facility]   Dermatology Clinic  [address] [place] [address]

## 2024-03-07 NOTE — PATIENT INSTRUCTIONS
Mohs/Excision Wound Care Instructions with Steri-Strips     Possible complications of any surgical procedure are bleeding, infection, scarring, alteration in skin color and sensation, muscle weakness in the area, wound dehiscence or seperation, or recurrence of the lesion or disease. On occasion, after healing, a secondary procedure or revision may be recommended in order to obtain the best cosmetic or functional result.     After your surgery, a pressure bandage will be placed over the area that has sutures. This will help prevent bleeding. Please, follow these instructions as they will help you to prevent complications as your wound heals.    For the First 48 hours After Surgery:    Leave the pressure bandage on and keep it dry. If it should come loose, you may retape it, but do not take it off.    Relax and take it easy. Do not do any vigorous exercise, heavy lifting, or bending forward. This could cause the wound to bleed.    Post-operative pain is usually mild. You may alternate between 1000 mg of Tylenol (acetaminophen) and 400 mg of Ibuprofen every 4 hours.  Do not take more than 4,000 mg of acetaminophen and more than 3200 mg of Ibuprofen in a 24 hr period.  Avoid alcohol and vitamin E as these may increase your tendency to bleed.    You may put an ice pack around the bandaged area for 20 minutes every 2-3 hours. This may help reduce swelling, bruising, and pain. Make sure the ice pack is waterproof so that the pressure bandage does not get wet.     If your bandage is saturated with blood apply firm pressure over the bandage with a washcloth for 15 minutes. If bleeding continues after applying pressure for 15 minutes then go to the nearest emergency room.      48 Hours After Surgery:    Remove outer white bandage down to clear plastic film (Tegaderm).  You may notice dark brown, dried blood under the Tegaderm.  This is normal.    Leave the clear plastic film (Tegaderm) on for up to 2 weeks, as long as it is  intact.  If it falls off prior to 2 weeks follow daily wound care below.  If it stays intact for the full 2 weeks, then remove and treat as normal, healthy skin.      Daily Wound Care (if Tegaderm and Steri-Strips fall off prior to 2 weeks):    Wash wound with a mild soap and water.  Use caution when washing the wound, be gentle and do not let the forceful shower stream hit the wound directly. DO NOT WASH WITH HYDROGEN PEROXIDE AS THIS MIGHT CAUSE THE STITCHES TO DISSOLVE FASTER THAN WHAT WE WANT.    Pat dry.    Apply Vaseline (from a new container or tube) over the suture line with a Q-tip until it is completely healed. It is very important to keep the wound continuously moist, as wounds heal best in a moist environment.    Keep the site covered until it's healed.  You can cover it with a Telfa (non-stick) dressing and tape or a band-aid until healed with normal, healthy skin.      Call Us If:    You have pain that is not controlled with Tylenol/Ibuprofen.    You have signs or symptoms of an infection, such as: fever over 100 degrees F, redness, warmth, or foul-smelling or yellow/creamy drainage from the wound.    Who should I call with questions?  [facility]: [phone]  [facility]: [phone]  For urgent needs outside of business hours call the [facility] at [phone] and ask for the dermatology resident on call

## 2024-03-07 NOTE — LETTER
3/7/2024         RE: Olga Bailey  [address]  [facility] [address]        Dear Colleague,    Thank you for referring your patient, Olga Bailey, to the [facility]. Please see a copy of my visit note below.    [facility] [place] Procedure Note    Dermatology Problem List:    1. 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  ______________________________________________________________    Date of Service:  Mar 7, 2024  Surgery: Mohs micrographic surgery    Case 1  Repair Type: Intermediate  Repair Size: 7.0 cm  Suture Material: monocryl 4-0, 5-0 fast gut   Tumor Type: SCC - Squamous cell carcinoma  Location: Right upper chest  Derm-Path Accession #: [id number]  PreOp Size: 2.8 x 2.1 cm  PostOp Size: 5.2 x 4.5 cm  Mohs Accession #: [id number]  Level of Defect: fascia      Procedure:  We discussed the principles of treatment and most likely complications including scarring, bleeding, infection, swelling, pain, crusting, nerve damage, large wound,  incomplete excision, wound dehiscence,  nerve damage, recurrence, and a second procedure may be recommended to obtain the best cosmetic or functional result.    Informed consent was obtained and the patient underwent the procedure as follows:  The patient was placed Other (comments) (Sitting) on the operating table.  The cancer was identified, outlined with a marker, and verified by the patient.  The entire surgical field was prepped with Other (comments) (PVP swab).  The surgical site was anesthetized using Lidocaine 1% with epi 1:100,000.      The area of clinically apparent tumor was debulked. The layer of tissue was then surgically excised using a #15 blade and was then transferred onto a specimen sheet  maintaining the orientation of the specimen. Hemostasis was obtained using monopolar electrodessication. The wound site was then covered with a dressing while the tissue samples were processed for examination.    The excised tissue was transported to the Mohs histology laboratory maintaining the tissue orientation.  The tissue specimen was relaxed so that the entire surgical margin was in a a single horizontal plane for sectioning and inked for precise mapping.  A precise reference map was drawn to reflect the sectioning of the specimen, colored inking of the margins, and orientation on the patient. The tissue was processed using horizontal sectioning of the base and continuous peripheral margins.  The histopathologic sections were reviewed in conjunction with the reference map.    Total blocks: 4    Total slides:  7    There were no cancer cells visualized on examination, therefore Mohs surgery was complete.    Reconstruction: Intermediate Linear Closure      The patient was taken to the operative suite and placed supine on the operating room table.  The defect was identified.  Appropriate markings were made with a marking pen to plan the repair.  The area was infiltrated with Lidocaine 1% with epi 1:100,000 and prepped with Hibiclens and draped with sterile towels.     The wound was debeveled and undermined widely.  Cones were excised within relaxed skin tension lines on both sides of the defect.  Hemostasis was obtained using electrofulguration.  Deep subcutaneous tissues were then approximated using 4-0 Monocryl buried vertical mattress sutures.  The wound edges were then approximated additional buried sutures were placed in a similar fashion where needed.  Percutaneous simple running 5-0 fast gut sutures were carefully placed for maximum eversion and meticulous approximation.    Repair Size: 7.0 cm    The wound was cleansed with saline and ointment was applied along the wound surface.     A sterile pressure  dressing was applied.  Wound care instructions were given verbally and in writing.  The patient left the operating suite in stable condition.      Dr. Nafisa Ricci (Mohs micrographic surgery fellow) performed the Mohs micrographic surgery and reconstruction under the direct supervision of Nico Rowe DO, who was present for the entire micrographic surgery and key portions of the reconstruction, and always immediately available.      Scribe Disclosure:   I, ONI ENRIQUEZ, am serving as a scribe; to document services personally performed by Ncio Rowe MD -based on data collection and the provider's statements to me.     Nafisa Ricci MD  Micrographic Surgery and Dermatologic Oncology (MSDO) Fellow, PGY-5    Staff Physician Comments:   I saw and evaluated the patient with the Mohs Surgery Fellow (Dr. Nafisa Ricci) and I agree with the assessment and plan and the above description of the procedure as documented by the scribe. I was present for the key portions of the procedure and entire exam. I was immediately available in the clinic throughout the procedures.     Nico Rowe DO    Department of Dermatology  [facility]: Phone: [phone], Fax:[phone]  [facility]: Phone: [phone], Fax: [phone]    Care and Laboratory Testing Performed at:  [facility]   Dermatology Clinic  [address] [place] [address]      DERMATOLOGY EXCISION PROCEDURE NOTE    Dermatology Problem List:    1. NMSC  - SCC, R upper chest, s/p MOHs 03/07/24  - SCC, L medial chest, s/p excision 03/07/24 (no prior biopsy)  _______________________________________________________     Case 2  NAME OF PROCEDURE: Excisional biopsy with intermediate layered linear closure  Staff surgeon: Dr. Nico Rowe  Fellow: Dr. Nafisa Ricci  Scrub Nurse: Melanie Tracey,  CMA    PRE-OPERATIVE DIAGNOSIS:  neoplasm of uncertain behavior, high clinical suspicion of SCC  POST-OPERATIVE DIAGNOSIS: Same   LOCATION: left medial chest  FINAL EXCISION SIZE(DEFECT SIZE): 2.3 x 1.7 cm  MARGIN: 0.4 cm  FINAL REPAIR LENGTH: 5.2 cm   ANESTHESIA: 9 mL 1% lidocaine with 1:100,000 epinephrine    INDICATIONS: This patient presented with a 1.5 x 0.9 cm neoplasm of uncertain behavior. Excision was indicated. We discussed the principles of treatment and most likely complications including scarring, bleeding, infection, incomplete excision, wound dehiscence, pain, nerve damage, and recurrence. Informed consent was obtained and the patient underwent the procedure as follows:    PROCEDURE: The patient was taken to the operative suite. Time-out was performed.  The treatment area was anesthetized with 1% lidocaine with epinephrine. The area was prepped with Betadine and draped with sterile towels. The lesion was delineated and excised down to subcutaneous fat in a elliptical manner. Hemostasis was obtained by electrocoagulation.     REPAIR: An intermediate layered linear closure was selected as the procedure which would maximally preserve both function and cosmesis.    After the excision of the tumor, the area was carefully  undermined. Hemostasis was obtained with heat cautery electrocoagulation.  Closure was oriented so that the wound was in the patient's natural skin tension lines. The deep subcutaneous and dermal layers were then closed with 4-0 monocryl sutures. The epidermis was then carefully approximated along the length of the wound using 5-0 fast gut simple running sutures.    Estimated blood loss was less than 10 ml for all surgical sites. A sterile pressure dressing was applied and wound care instructions, with a written handout, were given. The patient was discharged from the Dermatologic Surgery Center alert and ambulatory.    Dr. Rowe was immediately available for the entire surgery and was  physicially present for the key portions of the procedure.    Anatomic Pathology Results: pending    Clinical Follow-Up: primary dermatologist    Staff Involved:  Staff/ Fellow    Nafisa Ricci MD  Micrographic Surgery and Dermatologic Oncology (MSDO) Fellow, PGY-5    Staff Physician Comments:   I saw and evaluated the patient with the Mohs Surgery Fellow (Dr. Nafisa Ricci) and I agree with the assessment and plan and the above description of the procedure. I was present for the key portions of the procedure and entire exam. I was immediately available in the clinic throughout the procedures.       Nico Rowe DO    Department of Dermatology  [facility]: Phone: [phone], Fax:[phone]  [facility]: Phone: [phone], Fax: [phone]        Again, thank you for allowing me to participate in the care of your patient.        Sincerely,        Nico Rowe MD

## 2024-03-07 NOTE — PROGRESS NOTES
DERMATOLOGY EXCISION PROCEDURE NOTE    Dermatology Problem List:    1. NMSC  - SCC, R upper chest, s/p MOHs 03/07/24  - SCC, L medial chest, s/p excision 03/07/24 (no prior biopsy)  _______________________________________________________     Case 2  NAME OF PROCEDURE: Excisional biopsy with intermediate layered linear closure  Staff surgeon: Dr. Nico Rowe  Fellow: Dr. Nafisa Ricci  Scrub Nurse: Melanie Tracey CMA    PRE-OPERATIVE DIAGNOSIS:  neoplasm of uncertain behavior, high clinical suspicion of SCC  POST-OPERATIVE DIAGNOSIS: Same   LOCATION: left medial chest  FINAL EXCISION SIZE(DEFECT SIZE): 2.3 x 1.7 cm  MARGIN: 0.4 cm  FINAL REPAIR LENGTH: 5.2 cm   ANESTHESIA: 9 mL 1% lidocaine with 1:100,000 epinephrine    INDICATIONS: This patient presented with a 1.5 x 0.9 cm neoplasm of uncertain behavior. Excision was indicated. We discussed the principles of treatment and most likely complications including scarring, bleeding, infection, incomplete excision, wound dehiscence, pain, nerve damage, and recurrence. Informed consent was obtained and the patient underwent the procedure as follows:    PROCEDURE: The patient was taken to the operative suite. Time-out was performed.  The treatment area was anesthetized with 1% lidocaine with epinephrine. The area was prepped with Betadine and draped with sterile towels. The lesion was delineated and excised down to subcutaneous fat in a elliptical manner. Hemostasis was obtained by electrocoagulation.     REPAIR: An intermediate layered linear closure was selected as the procedure which would maximally preserve both function and cosmesis.    After the excision of the tumor, the area was carefully  undermined. Hemostasis was obtained with heat cautery electrocoagulation.  Closure was oriented so that the wound was in the patient's natural skin tension lines. The deep subcutaneous and dermal layers were then closed with 4-0 monocryl sutures. The epidermis was then carefully  approximated along the length of the wound using 5-0 fast gut simple running sutures.    Estimated blood loss was less than 10 ml for all surgical sites. A sterile pressure dressing was applied and wound care instructions, with a written handout, were given. The patient was discharged from the Dermatologic Surgery Center alert and ambulatory.    Dr. Rowe was immediately available for the entire surgery and was physicially present for the key portions of the procedure.    Anatomic Pathology Results: pending    Clinical Follow-Up: primary dermatologist    Staff Involved:  Staff/ Fellow    Nafisa Ricci MD  Micrographic Surgery and Dermatologic Oncology (MSDO) Fellow, PGY-5    Staff Physician Comments:   I saw and evaluated the patient with the Mohs Surgery Fellow (Dr. Nafisa Ricci) and I agree with the assessment and plan and the above description of the procedure. I was present for the key portions of the procedure and entire exam. I was immediately available in the clinic throughout the procedures.       Nico Rowe DO    Department of Dermatology  [facility]: Phone: [phone], Fax:[phone]  [facility]: Phone: [phone], Fax: [phone]

## 2024-03-07 NOTE — NURSING NOTE
Olga Bailey's goals for this visit include:   Chief Complaint   Patient presents with    Procedure     Mohs - SCC right upper chest       She requests these members of her care team be copied on today's visit information: n/a    PCP: [facility]    Referring Provider:  No referring provider defined for this encounter.    There were no vitals taken for this visit.    Do you need any medication refills at today's visit? No  Neeru Husain RN

## 2024-03-08 ENCOUNTER — TELEPHONE (OUTPATIENT)
Dept: FAMILY MEDICINE | Facility: CLINIC | Age: 79
End: 2024-03-08
Payer: MEDICARE

## 2024-03-08 NOTE — TELEPHONE ENCOUNTER
Patient Contact    Attempt # 1    Was call answered?  No.  Left message on daughter La Nena's voicemail with information to call nurse back at [phone] to schedule full body skin check for pt.    POA in chart is for Financial and Property decisions only.    Shraddha MCKOY RN  [facility]  [phone]

## 2024-03-08 NOTE — TELEPHONE ENCOUNTER
"Daughter La Nena called back and advised the POA on file is for Financial and Property Decisions only to speak with ex  (dad) and that the consent on file from 3/2021 is not valid because was not dated or timed.  La Nena states she and her brother are the HCAs for pt.  Advised pt needs to schedule a full body skin check.  Daughter wanted to know if \"legal\" has looked at the POA paperwork or if it could be sent for review.  Nurse advised did not know that answer.    S/w H[employer]ctor clinical manager who recommends completing a new consent to communicate form with health care advocates and ex  on the form since pt is not able to give consent.  Advised daughter of this who states understanding and will get a new consent to communicate form completed.      Shraddha MCKOY RN  [facility]  [phone]    "

## 2024-03-08 NOTE — TELEPHONE ENCOUNTER
Please call Fahad MENDEZ, to schedule Olga for a full body skin check in [place].  Dr. Rowe saw her for Mohs and an excision today in [place].  She lives in [place].     Thank you,   ELEANOR Siddiqi

## 2024-03-12 ENCOUNTER — TELEPHONE (OUTPATIENT)
Dept: DERMATOLOGY | Facility: CLINIC | Age: 79
End: 2024-03-12
Payer: MEDICARE

## 2024-03-12 NOTE — TELEPHONE ENCOUNTER
Writer called pt to discuss pathology results. Pt stated that the wound is healing well with no signs of infection. Pt denied having any questions or concerns at this time.     Flavia Fuchs RN on 3/12/2024 at 2:04 PM      Final Diagnosis  Left medial chest:  - Squamous cell carcinoma, well-differentiated, completely excised - (see description)  Electronically signed by Srikanth Scott MD on 3/11/2024 at  9:09 PM        Result Notes     Nafisa Ricci MD  3/12/2024 12:00 PM CDT Back to Top    Please contact patient's POA (ex- Fahad) and let him know that the pathologist confirmed the diagnosis of SCC for the lesion on the left chest. This lesion has already been treated with excisional biopsy and it looks like the lesion was completely removed with the excision. No further treatment needed for this spot.     Thanks!     Nafisa Ricci MD  Micrographic Surgery and Dermatologic Oncology (MSDO) Fellow, PGY-5

## 2024-03-13 ENCOUNTER — OFFICE VISIT (OUTPATIENT)
Dept: FAMILY MEDICINE | Facility: CLINIC | Age: 79
End: 2024-03-13
Payer: MEDICARE

## 2024-03-13 DIAGNOSIS — D18.01 CHERRY ANGIOMA: ICD-10-CM

## 2024-03-13 DIAGNOSIS — D48.9 NEOPLASM OF UNCERTAIN BEHAVIOR: ICD-10-CM

## 2024-03-13 DIAGNOSIS — Z85.828 HISTORY OF SCC (SQUAMOUS CELL CARCINOMA) OF SKIN: ICD-10-CM

## 2024-03-13 DIAGNOSIS — L82.1 SEBORRHEIC KERATOSES: ICD-10-CM

## 2024-03-13 DIAGNOSIS — D22.9 MULTIPLE BENIGN NEVI: Primary | ICD-10-CM

## 2024-03-13 DIAGNOSIS — L81.4 LENTIGINES: ICD-10-CM

## 2024-03-13 DIAGNOSIS — Z12.83 ENCOUNTER FOR SCREENING FOR MALIGNANT NEOPLASM OF SKIN: ICD-10-CM

## 2024-03-13 PROCEDURE — 99213 OFFICE O/P EST LOW 20 MIN: CPT | Performed by: NURSE PRACTITIONER

## 2024-03-13 ASSESSMENT — PAIN SCALES - GENERAL: PAINLEVEL: NO PAIN (0)
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
## 2024-03-19 ENCOUNTER — PATIENT OUTREACH (OUTPATIENT)
Dept: ONCOLOGY | Facility: CLINIC | Age: 79
End: 2024-03-19
Payer: MEDICARE
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
## 2024-04-05 ENCOUNTER — PATIENT OUTREACH (OUTPATIENT)
Dept: ONCOLOGY | Facility: CLINIC | Age: 79
End: 2024-04-05
Payer: MEDICARE

## 2024-04-05 NOTE — PROGRESS NOTES
"[facility]: Cancer Care                                                                                          Call received from Paul earlier this week regarding patientOlga  Per his message, Olga is doing significantly better since Dr. Baker has seen her last.   Since being off of the Lacosamide, her clinical condition has improved. She is \"almost ambulatory\".  He would like to know if Olga could have an MRI of the brain to \"determine if she has recurrent tumor\".  Last visit she was referred to hospice by Dr. Baker     Returned call, LV       Nafisa Vee, RN, BSN  Specialty Care Coordinator  [facility]  [phone]    "

## 2024-04-20 NOTE — LETTER
8/22/2023         RE: Olga Bailey  [address]  [facility] [address]        Dear Colleague,    Thank you for referring your patient, Olga Bailey, to the [facility]. Please see a copy of my visit note below.    NEURO-ONCOLOGY VISIT  Aug 22, 2023    CHIEF COMPLAINT: Ms. Olga Bailey is a 77 year old right-handed woman with a left frontoparietal glioblastoma (IDH wild-type, MGMT promoter methylated), diagnosed following gross total resection on 2/23/2021. Initiation of upfront cancer-directed treatment was delayed due to a complicated hospitalization. Given a resolving infection and lowered functional status, radiation alone was initiated on 5/4/2021 and completed on 5/27/2021.     Olga started adjuvant therapy with 150mg/m2 dosing of temozolomide, however, cycle 1 was complicated by significant hematologic toxicity. Dosing was changed to metronomic/ daily dosing in 7/2021 and this was well tolerated. Imaging from 8/2021 and 11/2021 demonstrates positive treatment effect.    Chemotherapy was placed on a hold in 10/2021 in the setting of severe constipation, but then, resumed. She has since completed the planned 6 months of adjuvant temozolomide as of 12/2021.    Imaging since 2/2022 has been without concern for cancer recurrence, however, imaging in 7/2022 showed a punctate area of contrast enhancement of indeterminate significance. Repeat imaging in 10/2022 showed near resolution of that punctate lesion and no evidence of cancer recurrence. Imaging in 1/2023 through 4/2023 has been without concern.     However, imaging in 8/2023 demonstrated changes of unclear significance and that require closing imaging surveillance.     Olga is presenting in follow-up accompanied by Fahad ().    HISTORY OF PRESENT ILLNESS  -Olga is living at [facility], which remains an ideal environment for her.   -Using a wheelchair, Fahad feels that she is more strong.  General: Well appearing, well developed and well nourished, no acute distress, patient with hot potato voice  HEENT: NC/AT, EOMI, + congestion or rhinorrhea, 4+ tonsils bilaterally   Neck: No lymphadenopathy, full ROM.  Resp: Normal respiratory effort, no tachypnea, CTAB, no wheezing or crackles.  CV: Regular rate and rhythm, normal S1 S2, no murmurs.   GI: Abdomen soft, nontender, nondistended.  Skin: No rashes or lesions.  MSK/Extremities: No joint swelling or tenderness, no stiffness, WWP, Cap refill <2secs. Easier transfers.    Mental processing has been more quick lately. Olga is reading more and is more interactive.    Looking to start OT and PT.   -Good energy.  -Mood is overall good.   -No headaches.  -No recurrent seizures since her last visit, tolerating Keppra.  -On anticoagulation with no significant signs/ symptoms of bleeding.  -Recovered from a UTI in June.   -Looking forward to celebrating her birthday later this week!      MEDICATIONS   Current Outpatient Medications   Medication     amLODIPine (NORVASC) 5 MG tablet     busPIRone HCl (BUSPAR) 30 MG tablet     docosanol (ABREVA) 10 % CREA cream     FLUoxetine (PROZAC) 20 MG capsule     ketoconazole (NIZORAL) 2 % external shampoo     levETIRAcetam (KEPPRA) 1000 MG tablet     levETIRAcetam (KEPPRA) 250 MG tablet     loperamide (IMODIUM) 2 MG capsule     melatonin 1 MG TABS tablet     Methylphenidate HCl (RITALIN PO)     multivitamin, therapeutic (THERA-VIT) TABS tablet     ondansetron (ZOFRAN) 4 MG tablet     pantoprazole (PROTONIX) 40 MG EC tablet     rivaroxaban ANTICOAGULANT (XARELTO ANTICOAGULANT) 20 MG TABS tablet     senna (SENOKOT) 8.6 MG tablet     acetaminophen (TYLENOL) 500 MG tablet     albuterol (PROAIR HFA/PROVENTIL HFA/VENTOLIN HFA) 108 (90 Base) MCG/ACT inhaler     albuterol (PROVENTIL) (2.5 MG/3ML) 0.083% neb solution     No current facility-administered medications for this visit.     Current Outpatient Medications   Medication Sig Dispense Refill     amLODIPine (NORVASC) 5 MG tablet Take 1 tablet (5 mg) by mouth daily (Patient taking differently: Take 5 mg by mouth daily Hold if SBP >160 or DBP <90)       busPIRone HCl (BUSPAR) 30 MG tablet Take 30 mg by mouth 2 times daily       docosanol (ABREVA) 10 % CREA cream Apply topically 5 times daily Apply to cold sore on lip       FLUoxetine (PROZAC) 20 MG capsule Take 2 capsules (40 mg) by mouth daily 30 capsule 0     ketoconazole (NIZORAL) 2 % external shampoo Apply topically twice a week On  Wed & Sun       levETIRAcetam (KEPPRA) 1000 MG tablet Take 1,000 mg by mouth 2 times daily Give with 250mg tab = 1250mg total dose twice daily       levETIRAcetam (KEPPRA) 250 MG tablet Take 250 mg by mouth 2 times daily Give with 100mg tab = 1250mg       loperamide (IMODIUM) 2 MG capsule TAKE 2 CAPSULES (4MG) BY MOUTH AFTER FIRST LOOSE STOOL, THEN;TAKE 1 CAPSULE AFTER CONSECUTIVE STOOLS, MAX 4 CAPS/24H       melatonin 1 MG TABS tablet Take 5 tablets (5 mg) by mouth nightly as needed for sleep       Methylphenidate HCl (RITALIN PO) Take 5 mg by mouth every morning       multivitamin, therapeutic (THERA-VIT) TABS tablet Take 1 tablet by mouth daily       ondansetron (ZOFRAN) 4 MG tablet Take 1 tablet (4 mg) by mouth every 8 hours as needed for nausea 30 tablet 3     pantoprazole (PROTONIX) 40 MG EC tablet Take 1 tablet (40 mg) by mouth 2 times daily (before meals) 30 tablet 0     rivaroxaban ANTICOAGULANT (XARELTO ANTICOAGULANT) 20 MG TABS tablet Take 1 tablet (20 mg) by mouth daily (with dinner) 30 tablet 3     senna (SENOKOT) 8.6 MG tablet Senokot 8.6 mg tablet       acetaminophen (TYLENOL) 500 MG tablet Take 500 mg by mouth every 4 hours as needed for mild pain        albuterol (PROAIR HFA/PROVENTIL HFA/VENTOLIN HFA) 108 (90 Base) MCG/ACT inhaler Inhale 2 puffs into the lungs every 4 hours as needed for wheezing 18 g 3     albuterol (PROVENTIL) (2.5 MG/3ML) 0.083% neb solution Take 1 vial (2.5 mg) by nebulization every 4 hours as needed for wheezing or shortness of breath / dyspnea (Patient not taking: Reported on 4/18/2023)       DRUG ALLERGIES   Allergies   Allergen Reactions     Pilocarpine Headache and Nausea and Vomiting     Chlorhexidine Itching and Rash     Aripiprazole Headache and Other (See Comments)     Insomnia, nightmares     Bacitracin Rash     Bactroban is effective; No difficulties     Erythromycin      intolerant.     Meperidine Hcl      intolerant only   Demerol     Chlorhexidine Gluconate Rash        IMMUNIZATIONS   Immunization History   Administered Date(s) Administered     COVID-19 Bivalent 18+ (Moderna) 12/13/2022     COVID-19 Monovalent 18+ (Moderna) 03/18/2021, 07/22/2021, 01/18/2022     Flu 65+ Years 09/28/2020     HepB 01/01/1990     Influenza (IIV3) PF 01/01/2001     Influenza Vaccine 65+ (Fluzone HD) 09/28/2020     Influenza Vaccine >6 months (Alfuria,Fluzone) 09/28/2020     Pneumo Conj 13-V (2010&after) 10/29/2014     Pneumococcal 23 valent 07/22/2020     TDAP Vaccine (Boostrix) 08/22/2016     Tetanus 06/13/2004     Zoster recombinant adjuvanted (SHINGRIX) 12/24/2019, 10/12/2020     Zoster vaccine, live 12/18/2008       ONCOLOGIC HISTORY  -2/2021 PRESENTATION: Progressive aphasia.   -2/19/2021 MR brain imaging with 3.3 x 2.8 x 2.8 cm enhancing mass in left frontal-parietal region. A second contrast enhancing lesion (0.5 x 1.0 x 1.0 cm) in right occipital lobe which is dural based and largely stable in size since 9/2011; likely representing a meningioma.  -2/23/2021 SURGERY: Gross total resection by Dr. Cummings  PATHOLOGY: Glioblastoma; IDH1-R132H wild-type/ IDH 1 and 2 wildtype, MGMT promoter methylated. Not BRAF mutated.   -3/23/2021 NEURO-ONC: Recommending chemoradiotherapy.   -3/2021 ADMISSION: SOB and chest pain; CT PA negative, but bilateral DVT noted on U/S. Started on Lovenox. Acute hypoxic respiratory failure in the setting of bilateral pneumonia; presumed PJP, concern for transfusion-related acute lung injury and right hemidiaphragm paralysis. Seizure. Right retroperitoneal hematoma. Ileus. Non-severe malnutrition on TPN with concern for refeeding syndrome. 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 Shock Liver.  -5/4 - 5/27/2021 RADS: 15 fractions.   -5/25/2021 NEURO-ONC/ DEVICE: Discussed the role of Optune; to be considered. Repeat MRI in 4 weeks with plans to start adjuvant temozolomide.   -6/22/2021 NEURO-ONC/ MRB/ CHEMO: Clinically improving. Imaging largely stable. Starting adjuvant  temozolomide 150mg/m2 (250mg), cycle 1 (start date 6/24).   -7/20/2021 NEURO-ONC/ CHEMO: Clinically improving. Thrombocytopenia noted with adjuvant temozolomide dosing, platelets of 26K; will transition to metronomic dosing 50mg/m2 (100mg) daily to start once platelets are > 80K.    -8/17/2021 NEURO-ONC/ MRB/ CHEMO: Clinically improving. Saw Dr. Gamboa, who recommended starting anticoagulation; on Eliquis. Imaging with positive treatment response. Continue metronomic/ daily dosing at 50mg/m2 (100mg) through 12/2021.    -9/14/2021 NEURO-ONC/ CHEMO: Clinically improving. Continue metronomic/ daily dosing at 50mg/m2 (100mg) through 12/2021.    -10/12/2021 NEURO-ONC/ CHEMO: Clinically improving. Holding temozolomide and Zofran in the setting of severe constipation. Abdominal x-ray with high stool burden; consulted Dr. Hodge for management of constipation given history of ileus.   -10/28/2021 CHEMO: Temozolomide restarted.   -11/16/2021 NEURO-ONC/ MRB/ CHEMO: Clinically improving Less constipation, continued low appetite; referral to palliative care for mental health management. Referral to Dr. Agudelo to optimize rehab. Imaging with continued positive treatment response. Continue daily temozolomide.   -12/21/2021 NEURO-ONC/ CHEMO: Clinically improving in terms of appetite, energy. Stopping daily temozolomide at the end of the month. Discussion with Dr. Gamboa regarding removal of IVC filter.   -2/14/2022 MRB with a stable postoperative changes of left parietal craniotomy and tumor resection.   -3/1/2022 NEURO-ONC: Clinically deconditioned, mood is depressed; following with Cancer PM&R and Palliative Care. With imaging stable, continue imaging surveillance.   -4/4/2022 MRB with stable postoperative change of left-sided craniotomy and tumor resection.  -7/19/2022 NEURO-ONC/ MRB: Clinically improving. Imaging with no overt evidence of cancer recurrence, new punctate area of contrast enhancement of indeterminate significance;  "continue imaging surveillance.   -10/4/2022 NEURO-ONC/ MRB: Clinically with improved cognition. Recommend stopping ritalin in light of no known benefit and worsening tremor. Reestablish care with Dr. Agudelo. Imaging with no overt evidence of cancer recurrence, the punctate area of contrast enhancement nearly resolved; continue imaging surveillance.   -1/17/2023 NEURO-ONC/ MRB: Stable neurological deficits; recommending neuropsych evaluation to objectively assess cognition and capacity. Imaging with no overt evidence of cancer recurrence.  -4/18/2023 NEURO-ONC/ MRB: Decrease in Hb. Completed neuro-psych evaluation; diagnosed as having dementia and is not capable of independent decision-making. Imaging with no new concerns.   -8/22/2023 NEURO-ONC/ MRB: No new neurological concerns. Imaging with changes of unclear significance; repeat in 1 month.       PHYSICAL EXAMINATION  /82   Pulse 71   Resp 16   Ht 1.6 m (5' 3\")   SpO2 98%   BMI 25.86 kg/m    Wt Readings from Last 2 Encounters:   04/18/23 66.2 kg (146 lb)   01/17/23 64.6 kg (142 lb 6.7 oz)      Ht Readings from Last 2 Encounters:   08/22/23 1.6 m (5' 3\")   06/03/22 1.753 m (5' 9\")     KPS: 50-60    -Good energy today. Engaged in the conversation.   -Psychiatric: Good mood, reactive affect. Pleasant.  -Neurologic:   MENTAL STATUS:     Alert.   Speech fluent, but with some speech hesitation.      Some confusion.    CRANIAL NERVES:     Pupils are equal, round.     No facial droop.   Hearing slightly decreased bilaterally.   Normal phonation.   GAIT: Sitting in a wheelchair, did not assess today.        MEDICAL RECORDS  Personally reviewed; PM&R notes    LABS  Personally reviewed all available lab results; UA concerning for infection, culture with Proteus mirabilis in June.     IMAGING  Personally reviewed MR brain imaging from today and compared to prior imaging. To my eye, there is an increase in post-contrast enhancement that is not accompanied by an " "increase in T2 FLAIR, DWI+, or T2 FLAIR (below). Significance is not clear and will repeat in short interval to reassess.     T1 post-contrast from today (top) and 4/2023 (bottom);      T1 post-contrast, T2 FLAIR, perfusion, DWI from today;      Formal read; \"MRI findings concerning for disease progression, along the resection margin as well as in the nearby frontoparietal parenchyma. Recommend short-term MRI follow-up.\"    Imaging was shown to and results were reviewed with Olga and her .       IMPRESSION  Clinic time of 40 minutes was spent reviewing data, in evaluation, and coordinating care for this high complexity visit. This was in addition to answering questions pertaining to my recommendations and devising the plan as outlined below.      Today, Olga's functional status remains stable to further improved in terms of her known neurological deficits related to her strength/ gait stability and cognition. I have also reviewed Dr. Agudelo's note from April in which the recommendation was to continue to with rehab therapies.     Imaging as detailed above with changes of unclear significance. I personally reviewed imaging with neuro-radiology and then, I called Fahad in follow-up. The plan is for short interval imaging in 1 month to closely monitor these changes.     Of note, per results of a recent neuro-psych evaluation, Olga was diagnosed with dementia and therefore, she is not capable of independent decision-making and will require considerable support and guidance for important decisions.    PROBLEM LIST  Glioblastoma  Aphasia  Apraxia  Lacks capacity    PLAN  -CANCER DIRECTED THERAPY-  -Repeat imaging in ~1 month to closely monitor changes.    -STEROIDS-  -Off dexamethasone.    -SEIZURE MANAGEMENT-  -Given history of seizures, antiepileptics are indicated.   -Continue Keppra.     -DVT-  -Following with Dr. Gamboa; currently on Eliquis.   -Requires lifelong anticoagulation given cancer " "diagnosis.   -IVC filter in place.     -Quality of life/ MOOD/ FATIGUE-  -History of depressed mood, PTSD, poor motivation, and poor appetite.   -Previously followed with palliative care to assess mental health and manage medications.  -Cannot tolerate CPAP for MARCELLE.    -ANEMIA-  -Deferring to primary care for monitoring and work-up.     -REHAB NEEDS-  -PT/ OT.   -Following with Dr. Agudelo in cancer rehab.    -COGNITIVE IMPAIRMENT-  -Completed neuropsychiatric evaluation; diagnosed with dementia.   -Olga dose not have capacity for complex decision making regarding any legal or medical choices.     Return to clinic in 9/2023 months + imaging.     Stephanie Baker MD  Neuro-oncology      Oncology Rooming Note    August 22, 2023 2:00 PM   Olga Bailey is a 77 year old female who presents for:    Chief Complaint   Patient presents with     Oncology Clinic Visit     Initial Vitals: Resp 16  Estimated body mass index is 21.56 kg/m  as calculated from the following:    Height as of 6/3/22: 1.753 m (5' 9\").    Weight as of 4/18/23: 66.2 kg (146 lb). There is no height or weight on file to calculate BSA.  Data Unavailable Comment: Data Unavailable   No LMP recorded. Patient has had a hysterectomy.  Allergies reviewed: Yes  Medications reviewed: Yes    Medications: Medication refills not needed today.  Pharmacy name entered into EPIC: Data Unavailable      Mariana Norman MA              Again, thank you for allowing me to participate in the care of your patient.        Sincerely,        Stephanie Baker MD  "

## 2024-05-05 ENCOUNTER — APPOINTMENT (OUTPATIENT)
Dept: CT IMAGING | Facility: CLINIC | Age: 79
DRG: 177 | End: 2024-05-05
Attending: EMERGENCY MEDICINE
Payer: MEDICARE

## 2024-05-05 ENCOUNTER — APPOINTMENT (OUTPATIENT)
Dept: SPEECH THERAPY | Facility: CLINIC | Age: 79
DRG: 177 | End: 2024-05-05
Attending: INTERNAL MEDICINE
Payer: MEDICARE

## 2024-05-05 ENCOUNTER — HOSPITAL ENCOUNTER (INPATIENT)
Facility: CLINIC | Age: 79
LOS: 5 days | Discharge: HOME HEALTH | DRG: 177 | End: 2024-05-10
Attending: EMERGENCY MEDICINE | Admitting: INTERNAL MEDICINE
Payer: MEDICARE

## 2024-05-05 ENCOUNTER — APPOINTMENT (OUTPATIENT)
Dept: GENERAL RADIOLOGY | Facility: CLINIC | Age: 79
DRG: 177 | End: 2024-05-05
Attending: EMERGENCY MEDICINE
Payer: MEDICARE

## 2024-05-05 DIAGNOSIS — F43.21 ADJUSTMENT DISORDER WITH DEPRESSED MOOD: ICD-10-CM

## 2024-05-05 DIAGNOSIS — J96.01 ACUTE RESPIRATORY FAILURE WITH HYPOXIA (H): ICD-10-CM

## 2024-05-05 DIAGNOSIS — Z79.01 CHRONIC ANTICOAGULATION: ICD-10-CM

## 2024-05-05 DIAGNOSIS — R13.19 OTHER DYSPHAGIA: ICD-10-CM

## 2024-05-05 DIAGNOSIS — R09.02 HYPOXIA: ICD-10-CM

## 2024-05-05 DIAGNOSIS — H04.123 DRY EYES: ICD-10-CM

## 2024-05-05 DIAGNOSIS — E87.1 HYPONATREMIA: ICD-10-CM

## 2024-05-05 DIAGNOSIS — I10 ESSENTIAL HYPERTENSION: ICD-10-CM

## 2024-05-05 DIAGNOSIS — R53.81 PHYSICAL DECONDITIONING: ICD-10-CM

## 2024-05-05 DIAGNOSIS — J18.9 PNEUMONIA OF LEFT LUNG DUE TO INFECTIOUS ORGANISM, UNSPECIFIED PART OF LUNG: ICD-10-CM

## 2024-05-05 DIAGNOSIS — K21.9 GASTROESOPHAGEAL REFLUX DISEASE WITHOUT ESOPHAGITIS: ICD-10-CM

## 2024-05-05 DIAGNOSIS — G40.909 RECURRENT SEIZURES (H): Primary | ICD-10-CM

## 2024-05-05 DIAGNOSIS — D64.9 CHRONIC ANEMIA: ICD-10-CM

## 2024-05-05 DIAGNOSIS — J44.1 COPD EXACERBATION (H): ICD-10-CM

## 2024-05-05 DIAGNOSIS — C71.9 GLIOBLASTOMA (H): ICD-10-CM

## 2024-05-05 DIAGNOSIS — N12 PYELONEPHRITIS: ICD-10-CM

## 2024-05-05 DIAGNOSIS — F41.9 ANXIETY: ICD-10-CM

## 2024-05-05 DIAGNOSIS — D69.6 THROMBOCYTOPENIA (H): ICD-10-CM

## 2024-05-05 DIAGNOSIS — R41.82 ALTERED MENTAL STATUS, UNSPECIFIED ALTERED MENTAL STATUS TYPE: ICD-10-CM

## 2024-05-05 LAB
ALBUMIN SERPL BCG-MCNC: 3.7 G/DL (ref 3.5–5.2)
ALP SERPL-CCNC: 83 U/L (ref 40–150)
ALT SERPL W P-5'-P-CCNC: 10 U/L (ref 0–50)
ANION GAP SERPL CALCULATED.3IONS-SCNC: 9 MMOL/L (ref 7–15)
AST SERPL W P-5'-P-CCNC: 16 U/L (ref 0–45)
ATRIAL RATE - MUSE: 72 BPM
BASE EXCESS BLDV CALC-SCNC: 3.9 MMOL/L (ref -3–3)
BASOPHILS # BLD AUTO: 0 10E3/UL (ref 0–0.2)
BASOPHILS NFR BLD AUTO: 1 %
BILIRUB SERPL-MCNC: 0.2 MG/DL
BUN SERPL-MCNC: 15.9 MG/DL (ref 8–23)
CALCIUM SERPL-MCNC: 9.2 MG/DL (ref 8.8–10.2)
CHLORIDE SERPL-SCNC: 104 MMOL/L (ref 98–107)
CREAT SERPL-MCNC: 0.89 MG/DL (ref 0.51–0.95)
DEPRECATED HCO3 PLAS-SCNC: 28 MMOL/L (ref 22–29)
DIASTOLIC BLOOD PRESSURE - MUSE: NORMAL MMHG
EGFRCR SERPLBLD CKD-EPI 2021: 66 ML/MIN/1.73M2
EOSINOPHIL # BLD AUTO: 0.6 10E3/UL (ref 0–0.7)
EOSINOPHIL NFR BLD AUTO: 11 %
ERYTHROCYTE [DISTWIDTH] IN BLOOD BY AUTOMATED COUNT: 14.8 % (ref 10–15)
FLUAV RNA SPEC QL NAA+PROBE: NEGATIVE
FLUBV RNA RESP QL NAA+PROBE: NEGATIVE
GLUCOSE SERPL-MCNC: 108 MG/DL (ref 70–99)
HCO3 BLDV-SCNC: 30 MMOL/L (ref 21–28)
HCT VFR BLD AUTO: 32 % (ref 35–47)
HGB BLD-MCNC: 9.9 G/DL (ref 11.7–15.7)
HOLD SPECIMEN: NORMAL
IMM GRANULOCYTES # BLD: 0 10E3/UL
IMM GRANULOCYTES NFR BLD: 0 %
INTERPRETATION ECG - MUSE: NORMAL
LACTATE SERPL-SCNC: 1.1 MMOL/L (ref 0.7–2)
LYMPHOCYTES # BLD AUTO: 1.3 10E3/UL (ref 0.8–5.3)
LYMPHOCYTES NFR BLD AUTO: 22 %
MCH RBC QN AUTO: 26.9 PG (ref 26.5–33)
MCHC RBC AUTO-ENTMCNC: 30.9 G/DL (ref 31.5–36.5)
MCV RBC AUTO: 87 FL (ref 78–100)
MONOCYTES # BLD AUTO: 0.7 10E3/UL (ref 0–1.3)
MONOCYTES NFR BLD AUTO: 13 %
NEUTROPHILS # BLD AUTO: 3.1 10E3/UL (ref 1.6–8.3)
NEUTROPHILS NFR BLD AUTO: 53 %
NRBC # BLD AUTO: 0 10E3/UL
NRBC BLD AUTO-RTO: 0 /100
NT-PROBNP SERPL-MCNC: 38 PG/ML (ref 0–1800)
O2/TOTAL GAS SETTING VFR VENT: 24 %
OXYHGB MFR BLDV: 77 % (ref 70–75)
P AXIS - MUSE: 42 DEGREES
PCO2 BLDV: 51 MM HG (ref 40–50)
PH BLDV: 7.38 [PH] (ref 7.32–7.43)
PLATELET # BLD AUTO: 217 10E3/UL (ref 150–450)
PO2 BLDV: 45 MM HG (ref 25–47)
POTASSIUM SERPL-SCNC: 3.9 MMOL/L (ref 3.4–5.3)
PR INTERVAL - MUSE: 200 MS
PROCALCITONIN SERPL IA-MCNC: 0.09 NG/ML
PROT SERPL-MCNC: 6.1 G/DL (ref 6.4–8.3)
QRS DURATION - MUSE: 84 MS
QT - MUSE: 414 MS
QTC - MUSE: 453 MS
R AXIS - MUSE: -5 DEGREES
RBC # BLD AUTO: 3.68 10E6/UL (ref 3.8–5.2)
RSV RNA SPEC NAA+PROBE: NEGATIVE
SAO2 % BLDV: 78 % (ref 70–75)
SARS-COV-2 RNA RESP QL NAA+PROBE: NEGATIVE
SODIUM SERPL-SCNC: 141 MMOL/L (ref 135–145)
SYSTOLIC BLOOD PRESSURE - MUSE: NORMAL MMHG
T AXIS - MUSE: 57 DEGREES
TROPONIN T SERPL HS-MCNC: 13 NG/L
TROPONIN T SERPL HS-MCNC: 19 NG/L
VENTRICULAR RATE- MUSE: 72 BPM
WBC # BLD AUTO: 5.8 10E3/UL (ref 4–11)

## 2024-05-05 PROCEDURE — 258N000003 HC RX IP 258 OP 636: Performed by: HOSPITALIST

## 2024-05-05 PROCEDURE — 82805 BLOOD GASES W/O2 SATURATION: CPT | Performed by: EMERGENCY MEDICINE

## 2024-05-05 PROCEDURE — 36415 COLL VENOUS BLD VENIPUNCTURE: CPT | Performed by: EMERGENCY MEDICINE

## 2024-05-05 PROCEDURE — 999N000157 HC STATISTIC RCP TIME EA 10 MIN

## 2024-05-05 PROCEDURE — 87637 SARSCOV2&INF A&B&RSV AMP PRB: CPT | Performed by: EMERGENCY MEDICINE

## 2024-05-05 PROCEDURE — 92610 EVALUATE SWALLOWING FUNCTION: CPT | Mod: GN | Performed by: SPEECH-LANGUAGE PATHOLOGIST

## 2024-05-05 PROCEDURE — 99223 1ST HOSP IP/OBS HIGH 75: CPT | Mod: AI | Performed by: INTERNAL MEDICINE

## 2024-05-05 PROCEDURE — 99285 EMERGENCY DEPT VISIT HI MDM: CPT | Mod: 25

## 2024-05-05 PROCEDURE — 71045 X-RAY EXAM CHEST 1 VIEW: CPT

## 2024-05-05 PROCEDURE — 85025 COMPLETE CBC W/AUTO DIFF WBC: CPT | Performed by: EMERGENCY MEDICINE

## 2024-05-05 PROCEDURE — 250N000013 HC RX MED GY IP 250 OP 250 PS 637: Performed by: HOSPITALIST

## 2024-05-05 PROCEDURE — 250N000011 HC RX IP 250 OP 636: Performed by: EMERGENCY MEDICINE

## 2024-05-05 PROCEDURE — 120N000001 HC R&B MED SURG/OB

## 2024-05-05 PROCEDURE — 80053 COMPREHEN METABOLIC PANEL: CPT | Performed by: EMERGENCY MEDICINE

## 2024-05-05 PROCEDURE — 84484 ASSAY OF TROPONIN QUANT: CPT | Performed by: HOSPITALIST

## 2024-05-05 PROCEDURE — 99207 PR NO CHARGE LOS: CPT | Performed by: HOSPITALIST

## 2024-05-05 PROCEDURE — 71250 CT THORAX DX C-: CPT | Mod: MG

## 2024-05-05 PROCEDURE — 83880 ASSAY OF NATRIURETIC PEPTIDE: CPT | Performed by: EMERGENCY MEDICINE

## 2024-05-05 PROCEDURE — 94640 AIRWAY INHALATION TREATMENT: CPT | Mod: 76

## 2024-05-05 PROCEDURE — 84145 PROCALCITONIN (PCT): CPT | Performed by: INTERNAL MEDICINE

## 2024-05-05 PROCEDURE — 36415 COLL VENOUS BLD VENIPUNCTURE: CPT | Performed by: HOSPITALIST

## 2024-05-05 PROCEDURE — 93005 ELECTROCARDIOGRAM TRACING: CPT

## 2024-05-05 PROCEDURE — 250N000012 HC RX MED GY IP 250 OP 636 PS 637: Performed by: INTERNAL MEDICINE

## 2024-05-05 PROCEDURE — 84484 ASSAY OF TROPONIN QUANT: CPT | Performed by: EMERGENCY MEDICINE

## 2024-05-05 PROCEDURE — 999N000156 HC STATISTIC RCP CONSULT EA 30 MIN

## 2024-05-05 PROCEDURE — 250N000011 HC RX IP 250 OP 636: Performed by: HOSPITALIST

## 2024-05-05 PROCEDURE — 250N000013 HC RX MED GY IP 250 OP 250 PS 637: Performed by: INTERNAL MEDICINE

## 2024-05-05 PROCEDURE — 250N000009 HC RX 250: Performed by: INTERNAL MEDICINE

## 2024-05-05 PROCEDURE — 94640 AIRWAY INHALATION TREATMENT: CPT

## 2024-05-05 PROCEDURE — 83605 ASSAY OF LACTIC ACID: CPT | Performed by: EMERGENCY MEDICINE

## 2024-05-05 PROCEDURE — 87040 BLOOD CULTURE FOR BACTERIA: CPT | Performed by: EMERGENCY MEDICINE

## 2024-05-05 RX ORDER — ALBUTEROL SULFATE 0.83 MG/ML
2.5 SOLUTION RESPIRATORY (INHALATION)
Status: DISCONTINUED | OUTPATIENT
Start: 2024-05-05 | End: 2024-05-10 | Stop reason: HOSPADM

## 2024-05-05 RX ORDER — IPRATROPIUM BROMIDE AND ALBUTEROL SULFATE 2.5; .5 MG/3ML; MG/3ML
3 SOLUTION RESPIRATORY (INHALATION)
Status: DISCONTINUED | OUTPATIENT
Start: 2024-05-05 | End: 2024-05-05

## 2024-05-05 RX ORDER — ALBUTEROL SULFATE 0.83 MG/ML
2.5 SOLUTION RESPIRATORY (INHALATION) EVERY 6 HOURS PRN
Status: ON HOLD | COMMUNITY
End: 2024-05-09

## 2024-05-05 RX ORDER — SODIUM CHLORIDE 9 MG/ML
INJECTION, SOLUTION INTRAVENOUS CONTINUOUS
Status: DISCONTINUED | OUTPATIENT
Start: 2024-05-05 | End: 2024-05-06

## 2024-05-05 RX ORDER — ACETAMINOPHEN 500 MG
500-1000 TABLET ORAL EVERY 4 HOURS PRN
COMMUNITY

## 2024-05-05 RX ORDER — IPRATROPIUM BROMIDE AND ALBUTEROL SULFATE 2.5; .5 MG/3ML; MG/3ML
3 SOLUTION RESPIRATORY (INHALATION)
Status: DISCONTINUED | OUTPATIENT
Start: 2024-05-05 | End: 2024-05-10 | Stop reason: HOSPADM

## 2024-05-05 RX ORDER — LORAZEPAM 0.5 MG/1
0.5 TABLET ORAL 2 TIMES DAILY PRN
COMMUNITY

## 2024-05-05 RX ORDER — ACETAMINOPHEN 650 MG/1
650 SUPPOSITORY RECTAL EVERY 4 HOURS PRN
Status: DISCONTINUED | OUTPATIENT
Start: 2024-05-05 | End: 2024-05-10 | Stop reason: HOSPADM

## 2024-05-05 RX ORDER — CALCIUM CARBONATE 500 MG/1
1000 TABLET, CHEWABLE ORAL 4 TIMES DAILY PRN
Status: DISCONTINUED | OUTPATIENT
Start: 2024-05-05 | End: 2024-05-10 | Stop reason: HOSPADM

## 2024-05-05 RX ORDER — ACETAMINOPHEN 325 MG/1
650 TABLET ORAL EVERY 4 HOURS PRN
Status: DISCONTINUED | OUTPATIENT
Start: 2024-05-05 | End: 2024-05-10 | Stop reason: HOSPADM

## 2024-05-05 RX ORDER — LOPERAMIDE HCL 2 MG
4 CAPSULE ORAL PRN
COMMUNITY

## 2024-05-05 RX ORDER — PANTOPRAZOLE SODIUM 40 MG/1
40 TABLET, DELAYED RELEASE ORAL
Status: DISCONTINUED | OUTPATIENT
Start: 2024-05-05 | End: 2024-05-10 | Stop reason: HOSPADM

## 2024-05-05 RX ORDER — AMOXICILLIN 250 MG
1 CAPSULE ORAL 2 TIMES DAILY PRN
Status: DISCONTINUED | OUTPATIENT
Start: 2024-05-05 | End: 2024-05-10 | Stop reason: HOSPADM

## 2024-05-05 RX ORDER — LACOSAMIDE 50 MG/1
50 TABLET ORAL 2 TIMES DAILY
Status: DISCONTINUED | OUTPATIENT
Start: 2024-05-05 | End: 2024-05-08

## 2024-05-05 RX ORDER — CEFTRIAXONE 1 G/1
1 INJECTION, POWDER, FOR SOLUTION INTRAMUSCULAR; INTRAVENOUS EVERY 24 HOURS
Status: DISCONTINUED | OUTPATIENT
Start: 2024-05-05 | End: 2024-05-07

## 2024-05-05 RX ORDER — LORAZEPAM 0.5 MG/1
0.5 TABLET ORAL 2 TIMES DAILY PRN
Status: DISCONTINUED | OUTPATIENT
Start: 2024-05-05 | End: 2024-05-10 | Stop reason: HOSPADM

## 2024-05-05 RX ORDER — ALBUTEROL SULFATE 0.83 MG/ML
2.5 SOLUTION RESPIRATORY (INHALATION) 3 TIMES DAILY
Status: ON HOLD | COMMUNITY
End: 2024-05-09

## 2024-05-05 RX ORDER — LIDOCAINE 40 MG/G
CREAM TOPICAL
Status: DISCONTINUED | OUTPATIENT
Start: 2024-05-05 | End: 2024-05-10 | Stop reason: HOSPADM

## 2024-05-05 RX ORDER — AMOXICILLIN 250 MG
1 CAPSULE ORAL DAILY PRN
COMMUNITY

## 2024-05-05 RX ORDER — LEVETIRACETAM 750 MG/1
750 TABLET ORAL 2 TIMES DAILY
COMMUNITY

## 2024-05-05 RX ORDER — PREDNISONE 20 MG/1
40 TABLET ORAL DAILY
Status: DISCONTINUED | OUTPATIENT
Start: 2024-05-05 | End: 2024-05-10 | Stop reason: HOSPADM

## 2024-05-05 RX ORDER — MAGNESIUM SULFATE HEPTAHYDRATE 40 MG/ML
2 INJECTION, SOLUTION INTRAVENOUS ONCE
Status: COMPLETED | OUTPATIENT
Start: 2024-05-05 | End: 2024-05-05

## 2024-05-05 RX ORDER — AMLODIPINE BESYLATE 5 MG/1
5 TABLET ORAL DAILY
Status: DISCONTINUED | OUTPATIENT
Start: 2024-05-05 | End: 2024-05-10 | Stop reason: HOSPADM

## 2024-05-05 RX ORDER — LEVETIRACETAM 500 MG/1
500 TABLET ORAL
Status: DISCONTINUED | OUTPATIENT
Start: 2024-05-05 | End: 2024-05-05

## 2024-05-05 RX ORDER — AMOXICILLIN 250 MG
2 CAPSULE ORAL 2 TIMES DAILY PRN
Status: DISCONTINUED | OUTPATIENT
Start: 2024-05-05 | End: 2024-05-10 | Stop reason: HOSPADM

## 2024-05-05 RX ADMIN — LACOSAMIDE 50 MG: 50 TABLET, FILM COATED ORAL at 21:13

## 2024-05-05 RX ADMIN — LACOSAMIDE 50 MG: 50 TABLET, FILM COATED ORAL at 10:00

## 2024-05-05 RX ADMIN — IPRATROPIUM BROMIDE AND ALBUTEROL SULFATE 3 ML: .5; 3 SOLUTION RESPIRATORY (INHALATION) at 15:32

## 2024-05-05 RX ADMIN — ALBUTEROL SULFATE 2.5 MG: 2.5 SOLUTION RESPIRATORY (INHALATION) at 06:02

## 2024-05-05 RX ADMIN — IPRATROPIUM BROMIDE AND ALBUTEROL SULFATE 3 ML: .5; 3 SOLUTION RESPIRATORY (INHALATION) at 11:48

## 2024-05-05 RX ADMIN — LEVETIRACETAM 750 MG: 250 TABLET, FILM COATED ORAL at 10:00

## 2024-05-05 RX ADMIN — FLUOXETINE HYDROCHLORIDE 20 MG: 20 CAPSULE ORAL at 12:02

## 2024-05-05 RX ADMIN — IPRATROPIUM BROMIDE AND ALBUTEROL SULFATE 3 ML: .5; 3 SOLUTION RESPIRATORY (INHALATION) at 21:12

## 2024-05-05 RX ADMIN — RIVAROXABAN 20 MG: 20 TABLET, FILM COATED ORAL at 19:15

## 2024-05-05 RX ADMIN — CEFTRIAXONE 1 G: 1 INJECTION, POWDER, FOR SOLUTION INTRAMUSCULAR; INTRAVENOUS at 13:25

## 2024-05-05 RX ADMIN — PANTOPRAZOLE SODIUM 40 MG: 40 TABLET, DELAYED RELEASE ORAL at 19:15

## 2024-05-05 RX ADMIN — LEVETIRACETAM 750 MG: 250 TABLET, FILM COATED ORAL at 21:13

## 2024-05-05 RX ADMIN — SODIUM CHLORIDE 1000 ML: 9 INJECTION, SOLUTION INTRAVENOUS at 17:20

## 2024-05-05 RX ADMIN — SODIUM CHLORIDE: 9 INJECTION, SOLUTION INTRAVENOUS at 17:20

## 2024-05-05 RX ADMIN — AMLODIPINE BESYLATE 5 MG: 5 TABLET ORAL at 12:02

## 2024-05-05 RX ADMIN — MAGNESIUM SULFATE HEPTAHYDRATE 2 G: 2 INJECTION, SOLUTION INTRAVENOUS at 04:23

## 2024-05-05 RX ADMIN — IPRATROPIUM BROMIDE AND ALBUTEROL SULFATE 3 ML: .5; 3 SOLUTION RESPIRATORY (INHALATION) at 06:29

## 2024-05-05 RX ADMIN — PREDNISONE 40 MG: 20 TABLET ORAL at 10:00

## 2024-05-05 ASSESSMENT — ACTIVITIES OF DAILY LIVING (ADL)
ADLS_ACUITY_SCORE: 59
ADLS_ACUITY_SCORE: 51
ADLS_ACUITY_SCORE: 58
ADLS_ACUITY_SCORE: 59
ADLS_ACUITY_SCORE: 51
ADLS_ACUITY_SCORE: 43
ADLS_ACUITY_SCORE: 51
ADLS_ACUITY_SCORE: 43
ADLS_ACUITY_SCORE: 43
ADLS_ACUITY_SCORE: 59
ADLS_ACUITY_SCORE: 58
ADLS_ACUITY_SCORE: 51
ADLS_ACUITY_SCORE: 58
ADLS_ACUITY_SCORE: 51
ADLS_ACUITY_SCORE: 59
ADLS_ACUITY_SCORE: 59
ADLS_ACUITY_SCORE: 58
ADLS_ACUITY_SCORE: 43
ADLS_ACUITY_SCORE: 59
ADLS_ACUITY_SCORE: 43
ADLS_ACUITY_SCORE: 51
ADLS_ACUITY_SCORE: 59

## 2024-05-05 ASSESSMENT — COLUMBIA-SUICIDE SEVERITY RATING SCALE - C-SSRS
1. IN THE PAST MONTH, HAVE YOU WISHED YOU WERE DEAD OR WISHED YOU COULD GO TO SLEEP AND NOT WAKE UP?: NO
6. HAVE YOU EVER DONE ANYTHING, STARTED TO DO ANYTHING, OR PREPARED TO DO ANYTHING TO END YOUR LIFE?: NO

## 2024-05-05 NOTE — PLAN OF CARE
Problem: Adult Inpatient Plan of Care  Goal: Absence of Hospital-Acquired Illness or Injury  Intervention: Identify and Manage Fall Risk  Recent Flowsheet Documentation  Taken 5/5/2024 1000 by Severance, Mary Jo, RN  Safety Promotion/Fall Prevention:   activity supervised   clutter free environment maintained   increased rounding and observation   lighting adjusted   nonskid shoes/slippers when out of bed   patient and family education   room door open   room near nurse's station   safety round/check completed  Intervention: Prevent Skin Injury  Recent Flowsheet Documentation  Taken 5/5/2024 1000 by Severance, Mary Jo, RN  Device Skin Pressure Protection:   absorbent pad utilized/changed   tubing/devices free from skin contact   positioning supports utilized  Intervention: Prevent Infection  Recent Flowsheet Documentation  Taken 5/5/2024 1000 by Severance, Mary Jo, RN  Infection Prevention: hand hygiene promoted     Problem: Fall Injury Risk  Goal: Absence of Fall and Fall-Related Injury  Intervention: Identify and Manage Contributors  Recent Flowsheet Documentation  Taken 5/5/2024 1000 by Severance, Mary Jo, RN  Medication Review/Management: high-risk medications identified  Intervention: Promote Injury-Free Environment  Recent Flowsheet Documentation  Taken 5/5/2024 1000 by Severance, Mary Jo, RN  Safety Promotion/Fall Prevention:   activity supervised   clutter free environment maintained   increased rounding and observation   lighting adjusted   nonskid shoes/slippers when out of bed   patient and family education   room door open   room near nurse's station   safety round/check completed     Problem: Comorbidity Management  Goal: Maintenance of COPD Symptom Control  Intervention: Maintain COPD Symptom Control  Recent Flowsheet Documentation  Taken 5/5/2024 1000 by Severance, Mary Jo, RN  Medication Review/Management: high-risk medications identified  Goal: Maintenance of Seizure Control  Intervention:  Maintain Seizure Symptom Control  Recent Flowsheet Documentation  Taken 5/5/2024 1000 by Severance, Mary Jo, RN  Seizure Precautions:   activity supervised   side rails padded  Medication Review/Management: high-risk medications identified   Goal Outcome Evaluation:  Sleepy day but easily aroused.  Able to answer short questions with yes or no response.  Limited use of hands due to tremors and contraction .  Use ceiling lift to chair to eat.  Fed with low intake of food or liquid this shift.  Purwick working well

## 2024-05-05 NOTE — CONSULTS
Care Management Initial Consult    General Information  Assessment completed with: Children, La Nena  Type of CM/SW Visit: Initial Assessment    Primary Care Provider verified and updated as needed: Yes   Readmission within the last 30 days: no previous admission in last 30 days      Reason for Consult: care coordination/care conference, discharge planning, other (see comments) (elevated risk score)          Communication Assessment  Patient's communication style: spoken language (English or Bilingual)    Hearing Difficulty or Deaf: no   Wear Glasses or Blind: yes    Cognitive  Cognitive/Neuro/Behavioral:  (slow responces, one word and slurred)                      Living Environment:   People in home: facility resident     Current living Arrangements: assisted living  [facility]        Family/Social Support:  Care provided by: other (see comments) (facility staff)  Provides care for: no one         Current Resources:   Equipment currently used at home: wheelchair, manual      Employment/Financial:  Does the patient's insurance plan have a 3 day qualifying hospital stay waiver?  No    Lifestyle & Psychosocial Needs:  Social Determinants of Health     Food Insecurity: Not on file   Depression: Not at risk (11/16/2020)    [facility], [employer] [facility]    PHQ-2     PHQ-2 TOTAL SCORE: 1   Housing Stability: Not on file   Tobacco Use: Low Risk  (3/13/2024)    Patient History     Smoking Tobacco Use: Never     Smokeless Tobacco Use: Never     Passive Exposure: Not on file   Financial Resource Strain: High Risk (12/23/2021)    [facility], [facility]    Financial Resource Strain     Difficulty of Paying Living Expenses: Not on file     Difficulty of Paying Living Expenses: Not on file   Alcohol Use: Not on file   Transportation Needs: Not on file  "  Physical Activity: Not on file   Interpersonal Safety: Not on file   Stress: Not on file   Social Connections: Unknown (12/23/2021)    Received from [facility], [facility] & [facility]    Social Connections     Frequency of Communication with Friends and Family: Not on file   Health Literacy: Not on file         Additional Information:  CM consulted for an elevated risk score and care coordination/discharge planning. Patient admitted with acute hypoxic respiratory failure. She has a history of \"glioblastoma s/p resection and treated with chemotherapy and radiation with remission in 2021 and subsequent recurrence in 2023, partial seizures and chronic Broca's aphasia secondary to glioblastoma, cognitive and functional impairment due to glioblastoma and chemoradiation, DVT, depression.\"    CM spoke with patient dtr La Nena via phone who confirmed that patient is a resident of [facility] and gave permission to call. CM placed call to [facility] who confirmed patient is a resident of [facility] for Jailyn WEBSTER case mgr to determine baseline level of assist provided and coordinate potential return. Per patient's dtr, patient is an assist of 2 with a logan steady at baseline.     Lisha Husain, RN, BSN  Inpatient Care Coordination  [facility]  [phone]    "

## 2024-05-05 NOTE — ED TRIAGE NOTES
Patient comes to ED for evaluation of increased shortness of breath. Patient comes from SNF, staff noted increased wheezing/rhonchi. Son at bedside, notes patient has had cough for over a week, reports no fever.  Alert to self, mostly non verbal.      Triage Assessment (Adult)       Row Name 05/05/24 0122          Triage Assessment    Airway WDL WDL        Respiratory WDL    Respiratory WDL X        Skin Circulation/Temperature WDL    Skin Circulation/Temperature WDL WDL        Cardiac WDL    Cardiac WDL WDL        Peripheral/Neurovascular WDL    Peripheral Neurovascular WDL WDL        Cognitive/Neuro/Behavioral WDL    Cognitive/Neuro/Behavioral WDL X

## 2024-05-05 NOTE — PROGRESS NOTES
"Replaced by [facility]     Date:5/5/24  Admission Dx:Possible Asp PNA, Fluid overload or COPD  Pulmonary History:   Home Nebulizer/MDI Use:NA  Home Oxygen:NA  Acuity Level (RCAT flow sheet):Level 3  Aerosol Therapy initiated:Duoneb QID and prn      Pulmonary Hygiene initiated:cough and deep breathing TID      Volume Expansion initiated:Incentive spirometry TID      Current Oxygen Requirements: 2LNC  Current SpO2:96%    Re-evaluation date:5/8/24    Patient Education:Education was performed with the patient in regards to indications/benefits and possible side effects of bronchodilators. Will continue to do education with patient.         See \"RT Assessments\" flow sheet for patient assessment scoring and Acuity Level Details.           "

## 2024-05-05 NOTE — PROVIDER NOTIFICATION
has questions. concerned that wife is not drinking enough.  can you come address his concerns. Thanks  Sent to Dr Peralta via voicera text. Charge nurse updated.   Yes

## 2024-05-05 NOTE — PROGRESS NOTES
"   IP Clinical Swallow Evaluation  [facility]  05/05/24 5924   Appointment Info   Signing Clinician's Name / Credentials (SLP) Twin Hannah MS CCC-SLP   General Information   Onset of Illness/Injury or Date of Surgery 05/05/24   Referring Physician Kem Orellana MD, MD   Patient/Family Therapy Goal Statement (SLP) Did not state (aphasia) but when asked if she felt her swallowing has changed she stated no.   Pertinent History of Current Problem Per provider H&P \"Olga Bailey is a 78 year old female admitted on 5/5/2024. She has a history of left frontoparietal glioblastoma s/p resection and treated with chemotherapy and radiation with remission in 2021 and subsequent recurrence in 2023, partial seizures and chronic Broca's aphasia secondary to glioblastoma, cognitive and functional impairment due to glioblastoma and chemoradiation, DVT, depression who is presently at a assisted living facility and was brought to the ER due to increased shortness of breath.  She was noted to have increased wheezing and rhonchi.  She has had a cough for a week however no fever.  At baseline she is not on home oxygen and uses a wheelchair.\" SLP consult to r/o aspiration as cause of pneumonia.   General Observations Patient alert and cooperative; expressive aphasia; wet vocal quality prior to po intake.   Type of Evaluation   Type of Evaluation Swallow Evaluation   Oral Motor   Oral Musculature anomalies present   Structural Abnormalities none present   Mucosal Quality adequate   Dentition (Oral Motor)   Dentition (Oral Motor) natural dentition;adequate dentition   Facial Symmetry (Oral Motor)   Facial Symmetry (Oral Motor) WNL   Lip Function (Oral Motor)   Lip Range of Motion (Oral Motor) WNL   Lip Strength (Oral Motor) WFL   Tongue Function (Oral Motor)   Tongue Strength (Oral Motor) WFL   Tongue Coordination/Speed (Oral Motor) reduced rate   Tongue ROM (Oral Motor) depression is impaired;elevation is " "impaired;protrusion is impaired;lateralization is impaired   Protrusion, Tongue ROM Impairment (Oral Motor) right side;minimal impairment   Depression, Tongue ROM Impairment (Oral Motor) right side;minimal impairment   Lateralization, Tongue ROM Impairment (Oral Motor) bilateral;minimal impairment   Elevation, Tongue ROM Impairment (Oral Motor) minimal impairment   Jaw Function (Oral Motor)   Jaw Function (Oral Motor) WNL   Cough/Swallow/Gag Reflex (Oral Motor)   Volitional Swallow (Oral Motor) WNL   Vocal Quality/Secretion Management (Oral Motor)   Vocal Quality (Oral Motor) wet/gurgly   Secretion Management (Oral Motor) wet/gurgly vocal quality   General Swallowing Observations   Past History of Dysphagia Extensive dysphagia history following left frontoparietal glioblastoma s/p resection, chemotherapy and radiation with remission in 2021, with recurrence in September 2023. She has had six video swallow studies between 4/2021 and 10/2023 with her most recent results as follows: \"Under fluoroscopy, pt presents with a functional oropharyngeal swallow mechanism. Pt assessed with thin liquids, mildly-thick liquids, pudding, and cracker. Oral phase functional. Swallow trigger WFL. Once triggered, velar elevation, BOT retraction, and pharyngeal stripping wave are adequate. Epiglottic inversion complete. Laryngeal vestibule closure is reduced resulting in penetration of thin and thick liquids during the swallow. Pt noted to have trace amounts of laryngeal vestibule residue with liquids after the swallow which spontaneously clears with a second swallow or reflexive throat clear. No aspiration on today's study. Recommend the pt advance to regular diet/thin liquids with assistance from caregivers to ensure she is following safe swallowing strategies (upright positioning, slow rate, fully alert). Recommend pt take meds as tolerated; given hx of difficulty taking meds via syringe, recommend \"syringe\" meds be first put into " "medicine cup and then pt can drink them with small controlled sips.\"   Current Diet/Method of Nutritional Intake (General Swallowing Observations, NIS) NPO   Swallowing Evaluation Clinical swallow evaluation   Clinical Swallow Evaluation   Feeding Assistance dependent  (patient states she usually feeds herself but was unable to hold cup.)   Clinical Swallow Evaluation Textures Trialed thin liquids;pureed;solid foods   Clinical Swallow Eval: Thin Liquid Texture Trial   Mode of Presentation, Thin Liquids straw;fed by clinician   Volume of Liquid or Food Presented 2 ounces water   Oral Phase of Swallow WFL   Pharyngeal Phase of Swallow intact   Diagnostic Statement No overt s/sx of aspiration. Patient had a wet vocal quality at baseline.   Clinical Swallow Evaluation: Puree Solid Texture Trial   Mode of Presentation, Puree spoon;fed by clinician   Volume of Puree Presented x3 bites of pudding   Oral Phase, Puree WFL   Pharyngeal Phase, Puree intact   Diagnostic Statement No overt s/sx of aspiration. Patient had a wet vocal quality at baseline.   Clinical Swallow Evaluation: Solid Food Texture Trial   Mode of Presentation fed by clinician   Volume Presented reno cracker with pudding on to moisten   Oral Phase WFL   Pharyngeal Phase intact   Diagnostic Statement No overt s/sx of aspiration. Patient had a wet vocal quality at baseline.   Esophageal Phase of Swallow   Patient reports or presents with symptoms of esophageal dysphagia No   Swallowing Recommendations   Diet Consistency Recommendations regular diet;thin liquids (level 0)   Supervision Level for Intake 1:1 supervision needed  (Initially 1:1 to determine how well patient can feed herself.)   Mode of Delivery Recommendations bolus size, small;slow rate of intake   Monitoring/Assistance Required (Eating/Swallowing) stop eating activities when fatigue is present;monitor for cough or change in vocal quality with intake   Recommended Feeding/Eating Techniques " (Swallow Eval) maintain upright sitting position for eating;maintain upright posture during/after eating for 30 minutes   Medication Administration Recommendations, Swallowing (SLP) whole with sips of liquid   Instrumental Assessment Recommendations instrumental evaluation not recommended at this time   General Therapy Interventions   Planned Therapy Interventions Dysphagia Treatment   Dysphagia treatment Instruction of safe swallow strategies   Clinical Impression   Criteria for Skilled Therapeutic Interventions Met (SLP Eval) Yes, treatment indicated   SLP Diagnosis Minimal oropharyngeal dysphagia   Risks & Benefits of therapy have been explained evaluation/treatment results reviewed;care plan/treatment goals reviewed;risks/benefits reviewed;current/potential barriers reviewed;participants voiced agreement with care plan;participants included;patient   Clinical Impression Comments Clinical swallow evaluation completed per provider orders. Patient with extensive history of dysphagia with frequent video swallow studies following glioblastoma resection, chemo and radiation. Patient progressed to a Regular diet with thin liquids with safe swallow strategies. Patient currently presents with a minimal oropharyngeal dysphagia and is likely at or near baseline swallowing function. No overt s/sx of aspiration with po trials of thin, puree and solids. Patient has a wet vocal quality at baseline and some difficulties handling secretions. Patient would benefit from short term speech therapy to provided education and reinforcement of safe swallow strategies. SLP will follow for 1-2 sessions.   SLP Total Evaluation Time   Eval: oral/pharyngeal swallow function, clinical swallow Minutes (05634) 18   Total Session Time   Total Session Time (sum of timed and untimed services) 18

## 2024-05-05 NOTE — PLAN OF CARE
"Goal Outcome Evaluation:       Plan of Care Reviewed With: patient    Overall Patient Progress: no changeOverall Patient Progress: no change      Problem: Adult Inpatient Plan of Care  Goal: Plan of Care Review  Description: The Plan of Care Review/Shift note should be completed every shift.  The Outcome Evaluation is a brief statement about your assessment that the patient is improving, declining, or no change.  This information will be displayed automatically on your shift  note.  Outcome: Not Progressing  Flowsheets (Taken 5/5/2024 7262)  Plan of Care Reviewed With: patient  Overall Patient Progress: no change  Goal: Patient-Specific Goal (Individualized)  Description: You can add care plan individualizations to a care plan. Examples of Individualization might be:  \"Parent requests to be called daily at 9am for status\", \"I have a hard time hearing out of my right ear\", or \"Do not touch me to wake me up as it startles  me\".  Outcome: Not Progressing  Goal: Absence of Hospital-Acquired Illness or Injury  Outcome: Not Progressing  Goal: Optimal Comfort and Wellbeing  Outcome: Not Progressing  Goal: Readiness for Transition of Care  Outcome: Not Progressing  Intervention: Mutually Develop Transition Plan  Recent Flowsheet Documentation  Taken 5/5/2024 0351 by Wojciech Solitario, RN  Equipment Currently Used at Home: wheelchair, manual     Problem: Fall Injury Risk  Goal: Absence of Fall and Fall-Related Injury  Outcome: Not Progressing     Problem: Oral Intake Inadequate  Goal: Improved Oral Intake  Outcome: Not Progressing     Problem: Comorbidity Management  Goal: Maintenance of COPD Symptom Control  Outcome: Not Progressing  Goal: Maintenance of Seizure Control  Outcome: Not Progressing         "

## 2024-05-05 NOTE — PHARMACY-ADMISSION MEDICATION HISTORY
Pharmacy Intern Admission Medication History    Admission medication history is complete. The information provided in this note is only as accurate as the sources available at the time of the update.    Information Source(s): Caregiver, Facility (U/NH/) medication list/MAR, and CareEverywhere/SureScripts via in-person    Pertinent Information: Patient's caregiver present a med list form the assistant living facility with last doses     Changes made to PTA medication list:  Added: Tylenol  Deleted: Albuterol inhaler, buspirone, melatonin, midazolam  Changed: Fluoxetine, Keppra, albuterol neb,    Allergies reviewed with patient and updates made in EHR: yes    Medication History Completed By: Eriberto Mitchell 5/5/2024 8:46 AM    PTA Med List   Medication Sig Last Dose    acetaminophen (TYLENOL) 500 MG tablet Take 500-1,000 mg by mouth every 4 hours as needed for mild pain Unknown at -    albuterol (PROVENTIL) (2.5 MG/3ML) 0.083% neb solution Take 2.5 mg by nebulization 3 times daily 5/4/2024 at pm    albuterol (PROVENTIL) (2.5 MG/3ML) 0.083% neb solution Take 2.5 mg by nebulization every 6 hours as needed for shortness of breath, wheezing or cough 5/4/2024 at am    amLODIPine (NORVASC) 5 MG tablet Take 1 tablet (5 mg) by mouth daily 5/4/2024 at am    FLUoxetine (PROZAC) 20 MG capsule Take 20 mg by mouth daily 5/4/2024 at am    lacosamide (VIMPAT) 50 MG TABS tablet Take 1 tablet (50 mg) by mouth 2 times daily 5/4/2024 at pm    levETIRAcetam (KEPPRA) 750 MG tablet Take 750 mg by mouth 2 times daily 5/4/2024 at pm    loperamide (IMODIUM) 2 MG capsule Take 4 mg by mouth as needed for diarrhea Then 1 capsule after consecutive stools. Unknown at -    LORazepam (ATIVAN) 0.5 MG tablet Take 0.5 mg by mouth 2 times daily as needed for seizures More than 10 minutes between each doses Unknown at -    multivitamin, therapeutic (THERA-VIT) TABS tablet Take 1 tablet by mouth daily 5/4/2024 at am    ondansetron (ZOFRAN) 4 MG tablet  Take 1 tablet (4 mg) by mouth every 8 hours as needed for nausea 5/4/2024 at pm    pantoprazole (PROTONIX) 40 MG EC tablet Take 1 tablet (40 mg) by mouth 2 times daily (before meals) 5/4/2024 at pm    polyethylene glycol (MIRALAX) 17 GM/Dose powder Take 17 g by mouth 2 times daily as needed for constipation Unknown at -    pramox-pe-glycerin-petrolatum (PREPARATION H) 1-0.25-14.4-15 % CREA cream Place rectally 3 times daily as needed for hemorrhoids Unknown at -    rivaroxaban ANTICOAGULANT (XARELTO ANTICOAGULANT) 20 MG TABS tablet Take 1 tablet (20 mg) by mouth daily (with dinner) 5/4/2024 at pm    senna-docusate (SENOKOT-S/PERICOLACE) 8.6-50 MG tablet Take 1 tablet by mouth daily as needed for constipation Unknown at -    sodium chloride (OCEAN) 0.65 % nasal spray Spray 1 spray into both nostrils every hour as needed for congestion Unknown at -

## 2024-05-05 NOTE — PROGRESS NOTES
Brief Progress Note. Pt admitted this AM. See H&P from Dr. Orellana.    Pt coming with GBM s/p resection and chemotherapy/radiation with remission in 2021 and recurrence in 2023, partial seizures, chronic Broca's Aphasia, cognitive and functional decline, DVT who presents from her shelter with SOB.    She initially was requiring 2L O2 to maintain saturations.  She is otherwise afebrile and HDS.  CT chest shows possible scattered areas of pneumonia with areas of mucoid impaction.  Pt started on IV abx for pneumonia.      Vitals:    05/05/24 0416 05/05/24 0538 05/05/24 0729 05/05/24 1100   BP:  121/59 (!) 157/71    BP Location:   Right arm    Pulse: 72 71 84    Resp: 19 19 20    Temp:  98.2  F (36.8  C) 98.3  F (36.8  C)    TempSrc:   Oral    SpO2: 96% 94% 95%    Weight:    72.6 kg (160 lb 0.9 oz)     Pt sitting in chair. Awakens to voice. Comfortable. She has some inspiratory crackles on left base. No wheeze. Heart is regular. She is able to answer simple yes/no questions appropriately.  She tells me she is from [place] and able to tell me her ex- and current caregiver's name.    Procal is negative. CBC without leukocytosis. VBG OK. CT chest as above.    Swallow study done and she is OK with diet but needs 1-1 assistance with meals.     Plan:  -Monitor for 24 hours. If doing OK tomorrow and remains off O2 could likely discharge back to [facility].  Added ceftriaxone for possible aspiration pneumonia for short course.   -General diet ordered with patient care order placed for 1:1 assist.  -Resumed home meds  -Repeat troponin to ensure no significant uptrend.  Suspect mild elevation is secondary to above.    Patient's daughter updated by phone.  She did confirm with me that her mom is full code.    Corey Peralta MD

## 2024-05-05 NOTE — ED NOTES
Bed: ED17  Expected date: 5/5/24  Expected time: 12:58 AM  Means of arrival:   Comments:  [id number]
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
## 2024-05-05 NOTE — ED PROVIDER NOTES
Emergency Department Note      History of Present Illness     Chief Complaint  Shortness of Breath and cough.    HPI  Olga Bailey is a 78 year old female on xarelto with history of COPD who presents with 2 weeks of worsening cough and shortness of breath. Patient's ex  reports she has not had a fever, vomiting, or diarrhea. She is not on steroids or home oxygen. Patient uses a wheelchair which makes it harder for her to take full breaths.   feels this may be contributing to her breathing issues as a somewhat restrictive on her chest.  She has had solumedrol and duo neb. Patient has history of glioblastoma and does not answer questions at baseline due to being nonverbal.  Also has a chronic right-sided deficit. She has history of pneumonia.     Independent Historian  Patient's ex  as detailed above.    Review of External Notes  None    Past Medical History   Medical History and Problem List  Past Medical History:   Diagnosis Date    Asthma     Basal cell carcinoma 2005    Chronic pain     Depressive disorder     Dysthymic disorder     GBM (glioblastoma multiforme)     Winged scapula    COPD  Colon polyp   Osteoarthritis   Hypertension     Medications  albuterol (PROAIR HFA/PROVENTIL HFA/VENTOLIN HFA) 108 (90 Base) MCG/ACT inhaler  amLODIPine (NORVASC) 5 MG tablet  busPIRone HCl (BUSPAR) 30 MG tablet  FLUoxetine (PROZAC) 20 MG capsule  lacosamide (VIMPAT) 50 MG TABS tablet  levETIRAcetam (KEPPRA) 1000 MG tablet  levETIRAcetam (KEPPRA) 250 MG tablet  melatonin 1 MG TABS tablet  Midazolam 5 MG/0.1ML SOLN  multivitamin, therapeutic (THERA-VIT) TABS tablet  ondansetron (ZOFRAN) 4 MG tablet  pantoprazole (PROTONIX) 40 MG EC tablet  polyethylene glycol (MIRALAX) 17 GM/Dose powder  pramox-pe-glycerin-petrolatum (PREPARATION H) 1-0.25-14.4-15 % CREA cream  rivaroxaban ANTICOAGULANT (XARELTO ANTICOAGULANT) 20 MG TABS tablet  sodium chloride (OCEAN) 0.65 % nasal spray    Desyrel    Seroquel   Lasix    Zyprexa     Surgical History   Tonsillectomy   Past Surgical History:   Procedure Laterality Date    ARTHROPLASTY KNEE Right 08/2020    ARTHROSCOPY KNEE Right     BUNIONECTOMY RT/LT Bilateral 01/01/1988    COLONOSCOPY      HYSTERECTOMY, HENRIQUE  01/01/1991    Hysterectomy - left ovary intact - on HRT in 2000    IR IVC FILTER PLACEMENT  04/16/2021    L4 -L5 discectomy  11/1998    OPTICAL TRACKING SYSTEM CRANIOTOMY, EXCISE TUMOR, COMBINED N/A 02/23/2021    Procedure: left parietal craniotomy for tumor resection;  Surgeon: Dario Cummings MD;  Location: [facility] OR    Ovarian mass removal - benign Right 1990    ROTATOR CUFF REPAIR RT/LT Left 01/01/1994    Subtalar arthrodesis NOS Left      Physical Exam   Patient Vitals for the past 24 hrs:   BP Temp Temp src Pulse Resp SpO2   05/05/24 0416 -- -- -- 72 19 96 %   05/05/24 0345 118/65 -- -- 71 20 94 %   05/05/24 0315 118/65 -- -- 71 20 93 %   05/05/24 0250 126/66 97.8  F (36.6  C) Oral 72 20 93 %   05/05/24 0213 -- -- -- -- -- 96 %   05/05/24 0200 -- -- -- 73 18 95 %   05/05/24 0140 -- -- -- 72 21 98 %   05/05/24 0130 -- -- -- 75 19 99 %   05/05/24 0122 120/61 97.8  F (36.6  C) Axillary 72 21 93 %   05/05/24 0120 120/61 -- Oral 74 22 92 %     Physical Exam  Nursing note and vitals reviewed.  Constitutional: Cooperative. Non verbal. Face mask oxygen in place  HENT:   Mouth/Throat: Mucous membranes are slightly dry  Cardiovascular: Normal rate, regular rhythm and normal heart sounds.  No murmur.  Pulmonary/Chest: Effort normal. Coarse rails left base most prominently. No respiratory distress.   Abdominal: Soft, nontender  Musculoskeletal: No edema in legs   Neurological: Alert.  Oriented and responds appropriately other than being nonverbal.  Chronic right-sided neurologic deficits noted  Skin: Skin is warm and dry.    Diagnostics   Lab Results   Labs Ordered and Resulted from Time of ED Arrival to Time of ED Departure   COMPREHENSIVE METABOLIC PANEL - Abnormal        Result Value    Sodium 141      Potassium 3.9      Carbon Dioxide (CO2) 28      Anion Gap 9      Urea Nitrogen 15.9      Creatinine 0.89      GFR Estimate 66      Calcium 9.2      Chloride 104      Glucose 108 (*)     Alkaline Phosphatase 83      AST 16      ALT 10      Protein Total 6.1 (*)     Albumin 3.7      Bilirubin Total 0.2     TROPONIN T, HIGH SENSITIVITY - Abnormal    Troponin T, High Sensitivity 19 (*)    BLOOD GAS VENOUS - Abnormal    pH Venous 7.38      pCO2 Venous 51 (*)     pO2 Venous 45      Bicarbonate Venous 30 (*)     Base Excess/Deficit Venous 3.9 (*)     FIO2 24      Oxyhemoglobin Venous 77 (*)     O2 Sat, Venous 78.0 (*)    CBC WITH PLATELETS AND DIFFERENTIAL - Abnormal    WBC Count 5.8      RBC Count 3.68 (*)     Hemoglobin 9.9 (*)     Hematocrit 32.0 (*)     MCV 87      MCH 26.9      MCHC 30.9 (*)     RDW 14.8      Platelet Count 217      % Neutrophils 53      % Lymphocytes 22      % Monocytes 13      % Eosinophils 11      % Basophils 1      % Immature Granulocytes 0      NRBCs per 100 WBC 0      Absolute Neutrophils 3.1      Absolute Lymphocytes 1.3      Absolute Monocytes 0.7      Absolute Eosinophils 0.6      Absolute Basophils 0.0      Absolute Immature Granulocytes 0.0      Absolute NRBCs 0.0     INFLUENZA A/B, RSV, & SARS-COV2 PCR - Normal    Influenza A PCR Negative      Influenza B PCR Negative      RSV PCR Negative      SARS CoV2 PCR Negative     LACTIC ACID WHOLE BLOOD - Normal    Lactic Acid 1.1     NT PROBNP INPATIENT - Normal    N terminal Pro BNP Inpatient 38     BLOOD CULTURE: Pending   BLOOD CULTURE: Pending       Imaging  CT Chest w/o Contrast   Final Result   IMPRESSION:    1.  Mosaic attenuation of the lungs with scattered areas of groundglass opacity, findings which may be seen with small airways disease, infection, or pulmonary edema. Note is made of thickening of multiple bronchi with scattered areas of mucoid    impaction.   2.  Cardiomegaly. Moderate atherosclerotic  calcifications of the coronary arteries.         XR Chest 1 View   Final Result   IMPRESSION: Negative chest.          EKG   ECG results from 05/05/24   EKG 12-lead, tracing only     Value    Systolic Blood Pressure     Diastolic Blood Pressure     Ventricular Rate 72    Atrial Rate 72    AR Interval 200    QRS Duration 84        QTc 453    P Axis 42    R AXIS -5    T Axis 57    Interpretation ECG      Sinus rhythm  Minimal voltage criteria for LVH, may be normal variant ( R in aVL )  Nonspecific T wave abnormality now evident in Lateral leads          Independent Interpretation  None    ED Course    Medications Administered  Medications   magnesium sulfate 2 g in 50 mL sterile water intermittent infusion (has no administration in time range)     Discussion of Management  Admitting Hospitalist, Kem Orellana MD    Social Determinants of Health adding to complexity of care  Due to underlying glioblastoma and neurologic deficits patient requires skilled nursing facility for daily healthcare management    ED Course  Past medical records, nursing notes, and vitals reviewed.  0131: I performed an exam of the patient and obtained history, as documented above.   0304 I rechecked the patient and explained findings. Currently on 2 L by nasal cannula.  0405 I updated the patient.    Medical Decision Making / Diagnosis     MDM  Olga Bailey is a 78 year old female who presents with a new oxygen requirement.  Currently she is resting comfortably on 2 L by nasal cannula.  She does have baseline COPD but is not normally oxygen dependent.  Solu-Medrol given by EMS prior to arrival.  Workup here does not show evidence of secondary bacterial infection.  CT scan and chest x-ray were unrevealing.  She is chronically anticoagulated making thrombotic disease highly unlikely.  Anemia is chronic and stable.  This does not appear to be an acute cardiogenic process.  She will be admitted to the hospital service for her COPD  exacerbation and respiratory care    Disposition  The patient was admitted to the hospital under the care of Kem Gallo MD.     ICD-10 Codes:    ICD-10-CM    1. Chronic anemia  D64.9       2. COPD exacerbation (H)  J44.1       3. Hypoxia  R09.02       4. Chronic anticoagulation  Z79.01            Scribe Disclosure:  Seven BOSTON, am serving as a scribe at 2:05 AM on 5/5/2024 to document services personally performed by Magnus Caban MD based on my observations and the provider's statements to me.     [employer]       Magnus Caban MD  05/05/24 5320

## 2024-05-05 NOTE — ED NOTES
[facility]  ED Nurse Handoff Report    ED Chief complaint: Shortness of Breath (Hx COPD, increased SOB/wheezing rhonchi noted this evening.)  . ED Diagnosis:   Final diagnoses:   Chronic anemia   COPD exacerbation (H)   Hypoxia   Chronic anticoagulation       Allergies:   Allergies   Allergen Reactions    Pilocarpine Headache and Nausea and Vomiting    Chlorhexidine Itching and Rash    Aripiprazole Headache and Other (See Comments)     Insomnia, nightmares    Bacitracin Rash     Bactroban is effective; No difficulties    Erythromycin      intolerant.    Meperidine Hcl      intolerant only   Demerol    Chlorhexidine Gluconate Rash       Code Status: Full Code    Activity level - Baseline/Home:  in bed.  Activity Level - Current:   in bed.   Lift room needed: Yes.   Bariatric: No   Needed: No   Isolation: No.   Infection: Not Applicable.     Respiratory status: Nasal cannula    Vital Signs (within 30 minutes):   Vitals:    05/05/24 0250 05/05/24 0315 05/05/24 0345 05/05/24 0416   BP: 126/66 118/65 118/65    Pulse: 72 71 71 72   Resp: 20 20 20 19   Temp: 97.8  F (36.6  C)      TempSrc: Oral      SpO2: 93% 93% 94% 96%       Cardiac Rhythm:  ,      Pain level:    Patient confused: No.   Patient Falls Risk: nonskid shoes/slippers when out of bed, arm band in place, and patient and family education.   Elimination Status: Has voided     Patient Report - Initial Complaint: Shortness of breath/wheezing.   Focused Assessment: Olga Bailey is a 78 year old female on xarelto with history of COPD who presents with 2 weeks of worsening cough and shortness of breath. Patient's ex  reports she has not had a fever vomiting, or diarrhea. She is not on steroids or home oxygen. Patient uses a wheelchair which makes it harder for her to take full breaths. She has had solumedrol and duo neb. Patient has history of glioblastoma and does not answer questions, contractures on right side, aphasia.  She has history of pneumonia.      Abnormal Results:   Labs Ordered and Resulted from Time of ED Arrival to Time of ED Departure   COMPREHENSIVE METABOLIC PANEL - Abnormal       Result Value    Sodium 141      Potassium 3.9      Carbon Dioxide (CO2) 28      Anion Gap 9      Urea Nitrogen 15.9      Creatinine 0.89      GFR Estimate 66      Calcium 9.2      Chloride 104      Glucose 108 (*)     Alkaline Phosphatase 83      AST 16      ALT 10      Protein Total 6.1 (*)     Albumin 3.7      Bilirubin Total 0.2     TROPONIN T, HIGH SENSITIVITY - Abnormal    Troponin T, High Sensitivity 19 (*)    BLOOD GAS VENOUS - Abnormal    pH Venous 7.38      pCO2 Venous 51 (*)     pO2 Venous 45      Bicarbonate Venous 30 (*)     Base Excess/Deficit Venous 3.9 (*)     FIO2 24      Oxyhemoglobin Venous 77 (*)     O2 Sat, Venous 78.0 (*)    CBC WITH PLATELETS AND DIFFERENTIAL - Abnormal    WBC Count 5.8      RBC Count 3.68 (*)     Hemoglobin 9.9 (*)     Hematocrit 32.0 (*)     MCV 87      MCH 26.9      MCHC 30.9 (*)     RDW 14.8      Platelet Count 217      % Neutrophils 53      % Lymphocytes 22      % Monocytes 13      % Eosinophils 11      % Basophils 1      % Immature Granulocytes 0      NRBCs per 100 WBC 0      Absolute Neutrophils 3.1      Absolute Lymphocytes 1.3      Absolute Monocytes 0.7      Absolute Eosinophils 0.6      Absolute Basophils 0.0      Absolute Immature Granulocytes 0.0      Absolute NRBCs 0.0     INFLUENZA A/B, RSV, & SARS-COV2 PCR - Normal    Influenza A PCR Negative      Influenza B PCR Negative      RSV PCR Negative      SARS CoV2 PCR Negative     LACTIC ACID WHOLE BLOOD - Normal    Lactic Acid 1.1     NT PROBNP INPATIENT - Normal    N terminal Pro BNP Inpatient 38     BLOOD CULTURE   BLOOD CULTURE        CT Chest w/o Contrast   Final Result   IMPRESSION:    1.  Mosaic attenuation of the lungs with scattered areas of groundglass opacity, findings which may be seen with small airways disease, infection, or  pulmonary edema. Note is made of thickening of multiple bronchi with scattered areas of mucoid    impaction.   2.  Cardiomegaly. Moderate atherosclerotic calcifications of the coronary arteries.         XR Chest 1 View   Final Result   IMPRESSION: Negative chest.          Treatments provided: labs/imaging/O2  Family Comments: at bedside, very knowledgable for history/meds.  OBS brochure/video discussed/provided to patient:  No  ED Medications:   Medications   magnesium sulfate 2 g in 50 mL sterile water intermittent infusion (2 g Intravenous $New Bag 5/5/24 4016)       Drips infusing:  No  For the majority of the shift this patient was Green.   Interventions performed were none.    Sepsis treatment initiated: No    Cares/treatment/interventions/medications to be completed following ED care: see orders.    ED Nurse Name: Alessandra Borges RN  4:26 AM     RECEIVING UNIT ED HANDOFF REVIEW    Above ED Nurse Handoff Report was reviewed: Yes  Reviewed by: Wojciech Solitario RN on May 5, 2024 at 4:57 AM   I Laila called the ED to inform them the note was read: Yes

## 2024-05-06 LAB
ANION GAP SERPL CALCULATED.3IONS-SCNC: 10 MMOL/L (ref 7–15)
BUN SERPL-MCNC: 16.7 MG/DL (ref 8–23)
CALCIUM SERPL-MCNC: 9.3 MG/DL (ref 8.8–10.2)
CHLORIDE SERPL-SCNC: 110 MMOL/L (ref 98–107)
CREAT SERPL-MCNC: 0.69 MG/DL (ref 0.51–0.95)
DEPRECATED HCO3 PLAS-SCNC: 25 MMOL/L (ref 22–29)
EGFRCR SERPLBLD CKD-EPI 2021: 88 ML/MIN/1.73M2
ERYTHROCYTE [DISTWIDTH] IN BLOOD BY AUTOMATED COUNT: 14.7 % (ref 10–15)
GLUCOSE SERPL-MCNC: 102 MG/DL (ref 70–99)
HCT VFR BLD AUTO: 30.5 % (ref 35–47)
HGB BLD-MCNC: 9.3 G/DL (ref 11.7–15.7)
MAGNESIUM SERPL-MCNC: 2.2 MG/DL (ref 1.7–2.3)
MCH RBC QN AUTO: 26.6 PG (ref 26.5–33)
MCHC RBC AUTO-ENTMCNC: 30.5 G/DL (ref 31.5–36.5)
MCV RBC AUTO: 87 FL (ref 78–100)
PLATELET # BLD AUTO: 217 10E3/UL (ref 150–450)
POTASSIUM SERPL-SCNC: 4.1 MMOL/L (ref 3.4–5.3)
RBC # BLD AUTO: 3.49 10E6/UL (ref 3.8–5.2)
SODIUM SERPL-SCNC: 145 MMOL/L (ref 135–145)
WBC # BLD AUTO: 7 10E3/UL (ref 4–11)

## 2024-05-06 PROCEDURE — 94640 AIRWAY INHALATION TREATMENT: CPT

## 2024-05-06 PROCEDURE — 80048 BASIC METABOLIC PNL TOTAL CA: CPT | Performed by: HOSPITALIST

## 2024-05-06 PROCEDURE — 250N000012 HC RX MED GY IP 250 OP 636 PS 637: Performed by: INTERNAL MEDICINE

## 2024-05-06 PROCEDURE — 999N000157 HC STATISTIC RCP TIME EA 10 MIN

## 2024-05-06 PROCEDURE — 85027 COMPLETE CBC AUTOMATED: CPT | Performed by: HOSPITALIST

## 2024-05-06 PROCEDURE — 250N000013 HC RX MED GY IP 250 OP 250 PS 637: Performed by: HOSPITALIST

## 2024-05-06 PROCEDURE — 36415 COLL VENOUS BLD VENIPUNCTURE: CPT | Performed by: HOSPITALIST

## 2024-05-06 PROCEDURE — 99232 SBSQ HOSP IP/OBS MODERATE 35: CPT | Performed by: HOSPITALIST

## 2024-05-06 PROCEDURE — 258N000003 HC RX IP 258 OP 636: Performed by: HOSPITALIST

## 2024-05-06 PROCEDURE — 250N000013 HC RX MED GY IP 250 OP 250 PS 637: Performed by: INTERNAL MEDICINE

## 2024-05-06 PROCEDURE — 250N000011 HC RX IP 250 OP 636: Performed by: HOSPITALIST

## 2024-05-06 PROCEDURE — 94640 AIRWAY INHALATION TREATMENT: CPT | Mod: 76

## 2024-05-06 PROCEDURE — 120N000001 HC R&B MED SURG/OB

## 2024-05-06 PROCEDURE — 83735 ASSAY OF MAGNESIUM: CPT | Performed by: HOSPITALIST

## 2024-05-06 PROCEDURE — 250N000009 HC RX 250: Performed by: INTERNAL MEDICINE

## 2024-05-06 RX ORDER — GUAIFENESIN 600 MG/1
600 TABLET, EXTENDED RELEASE ORAL 2 TIMES DAILY
Status: DISCONTINUED | OUTPATIENT
Start: 2024-05-06 | End: 2024-05-10 | Stop reason: HOSPADM

## 2024-05-06 RX ADMIN — IPRATROPIUM BROMIDE AND ALBUTEROL SULFATE 3 ML: .5; 3 SOLUTION RESPIRATORY (INHALATION) at 20:29

## 2024-05-06 RX ADMIN — LEVETIRACETAM 750 MG: 250 TABLET, FILM COATED ORAL at 21:02

## 2024-05-06 RX ADMIN — SODIUM CHLORIDE: 9 INJECTION, SOLUTION INTRAVENOUS at 03:30

## 2024-05-06 RX ADMIN — LACOSAMIDE 50 MG: 50 TABLET, FILM COATED ORAL at 21:03

## 2024-05-06 RX ADMIN — GUAIFENESIN 600 MG: 600 TABLET, EXTENDED RELEASE ORAL at 13:15

## 2024-05-06 RX ADMIN — GUAIFENESIN 600 MG: 600 TABLET, EXTENDED RELEASE ORAL at 21:03

## 2024-05-06 RX ADMIN — LEVETIRACETAM 750 MG: 250 TABLET, FILM COATED ORAL at 09:29

## 2024-05-06 RX ADMIN — PREDNISONE 40 MG: 20 TABLET ORAL at 09:29

## 2024-05-06 RX ADMIN — PANTOPRAZOLE SODIUM 40 MG: 40 TABLET, DELAYED RELEASE ORAL at 09:29

## 2024-05-06 RX ADMIN — LACOSAMIDE 50 MG: 50 TABLET, FILM COATED ORAL at 09:29

## 2024-05-06 RX ADMIN — PANTOPRAZOLE SODIUM 40 MG: 40 TABLET, DELAYED RELEASE ORAL at 16:39

## 2024-05-06 RX ADMIN — RIVAROXABAN 20 MG: 20 TABLET, FILM COATED ORAL at 16:39

## 2024-05-06 RX ADMIN — AMLODIPINE BESYLATE 5 MG: 5 TABLET ORAL at 09:29

## 2024-05-06 RX ADMIN — IPRATROPIUM BROMIDE AND ALBUTEROL SULFATE 3 ML: .5; 3 SOLUTION RESPIRATORY (INHALATION) at 15:26

## 2024-05-06 RX ADMIN — IPRATROPIUM BROMIDE AND ALBUTEROL SULFATE 3 ML: .5; 3 SOLUTION RESPIRATORY (INHALATION) at 08:40

## 2024-05-06 RX ADMIN — SENNOSIDES AND DOCUSATE SODIUM 1 TABLET: 50; 8.6 TABLET ORAL at 17:01

## 2024-05-06 RX ADMIN — CEFTRIAXONE 1 G: 1 INJECTION, POWDER, FOR SOLUTION INTRAMUSCULAR; INTRAVENOUS at 13:16

## 2024-05-06 RX ADMIN — FLUOXETINE HYDROCHLORIDE 20 MG: 20 CAPSULE ORAL at 09:28

## 2024-05-06 ASSESSMENT — ACTIVITIES OF DAILY LIVING (ADL)
ADLS_ACUITY_SCORE: 62
ADLS_ACUITY_SCORE: 62
ADLS_ACUITY_SCORE: 58
ADLS_ACUITY_SCORE: 62
ADLS_ACUITY_SCORE: 58
ADLS_ACUITY_SCORE: 71
ADLS_ACUITY_SCORE: 62
ADLS_ACUITY_SCORE: 58
ADLS_ACUITY_SCORE: 62
ADLS_ACUITY_SCORE: 58
ADLS_ACUITY_SCORE: 62

## 2024-05-06 NOTE — PLAN OF CARE
Problem: Adult Inpatient Plan of Care  Goal: Plan of Care Review  Description: The Plan of Care Review/Shift note should be completed every shift.  The Outcome Evaluation is a brief statement about your assessment that the patient is improving, declining, or no change.  This information will be displayed automatically on your shift  note.  Outcome: Progressing  Flowsheets (Taken 5/6/2024 1555)  Outcome Evaluation: more awake and able to speak better.  RA sats 90 at rest.  Total care  Overall Patient Progress: improving  Goal: Absence of Hospital-Acquired Illness or Injury  Intervention: Identify and Manage Fall Risk  Recent Flowsheet Documentation  Taken 5/6/2024 0917 by Severance, Mary Jo, RN  Safety Promotion/Fall Prevention:   activity supervised   increased rounding and observation   lighting adjusted   nonskid shoes/slippers when out of bed   room door open   room near nurse's station  Intervention: Prevent Skin Injury  Recent Flowsheet Documentation  Taken 5/6/2024 0917 by Severance, Mary Jo, RN  Device Skin Pressure Protection: (turn or shift weight q 2 hrs) --  Intervention: Prevent Infection  Recent Flowsheet Documentation  Taken 5/6/2024 0917 by Severance, Mary Jo, RN  Infection Prevention: hand hygiene promoted     Problem: Fall Injury Risk  Goal: Absence of Fall and Fall-Related Injury  Intervention: Identify and Manage Contributors  Recent Flowsheet Documentation  Taken 5/6/2024 0917 by Severance, Mary Jo, RN  Medication Review/Management: high-risk medications identified  Intervention: Promote Injury-Free Environment  Recent Flowsheet Documentation  Taken 5/6/2024 0917 by Severance, Mary Jo, RN  Safety Promotion/Fall Prevention:   activity supervised   increased rounding and observation   lighting adjusted   nonskid shoes/slippers when out of bed   room door open   room near nurse's station     Problem: Comorbidity Management  Goal: Maintenance of COPD Symptom Control  Intervention: Maintain COPD  Symptom Control  Recent Flowsheet Documentation  Taken 5/6/2024 0917 by Severance, Mary Jo, RN  Supportive Measures: active listening utilized  Medication Review/Management: high-risk medications identified  Goal: Maintenance of Seizure Control  Intervention: Maintain Seizure Symptom Control  Recent Flowsheet Documentation  Taken 5/6/2024 0917 by Severance, Mary Jo, RN  Seizure Precautions:   activity supervised   clutter-free environment maintained   side rails padded  Medication Review/Management: high-risk medications identified   Goal Outcome Evaluation:           Overall Patient Progress: improvingOverall Patient Progress: improving    Outcome Evaluation: more awake and able to speak better.  RA sats 90 at rest.  Total care    Total care.  Occas. Loose cough.  Shallow breather..   volume.  PO intake better today but still low

## 2024-05-06 NOTE — PLAN OF CARE
"  Problem: Adult Inpatient Plan of Care  Goal: Plan of Care Review  Description: The Plan of Care Review/Shift note should be completed every shift.  The Outcome Evaluation is a brief statement about your assessment that the patient is improving, declining, or no change.  This information will be displayed automatically on your shift  note.  Outcome: Progressing  Flowsheets (Taken 5/5/2024 2250)  Outcome Evaluation: low po intake on days. given iv bolus and started maintenance iv per orders and urine output increased. sleepy first half of shift and more awake later in evening. took meds with applesauce. ex  present and supportive. turned with assistance. nebs per RT.  Plan of Care Reviewed With: patient  Overall Patient Progress: improving  Goal: Patient-Specific Goal (Individualized)  Description: You can add care plan individualizations to a care plan. Examples of Individualization might be:  \"Parent requests to be called daily at 9am for status\", \"I have a hard time hearing out of my right ear\", or \"Do not touch me to wake me up as it startles  me\".  Outcome: Progressing  Goal: Absence of Hospital-Acquired Illness or Injury  Outcome: Progressing  Intervention: Identify and Manage Fall Risk  Recent Flowsheet Documentation  Taken 5/5/2024 1602 by Saskia Thomas RN  Safety Promotion/Fall Prevention: activity supervised  Intervention: Prevent Skin Injury  Recent Flowsheet Documentation  Taken 5/5/2024 2000 by Saskia Thomas RN  Body Position: left  Taken 5/5/2024 1800 by Saskia Thomas RN  Body Position: right  Taken 5/5/2024 1602 by Saskia Thomas RN  Body Position:   supine, head elevated   neutral body alignment  Skin Protection: adhesive use limited  Device Skin Pressure Protection:   adhesive use limited   pressure points protected  Intervention: Prevent and Manage VTE (Venous Thromboembolism) Risk  Recent Flowsheet Documentation  Taken 5/5/2024 1602 by Saskia Thomas RN  VTE Prevention/Management: SCDs (sequential " compression devices) off  Intervention: Prevent Infection  Recent Flowsheet Documentation  Taken 5/5/2024 1602 by Saskia Thomas RN  Infection Prevention:   hand hygiene promoted   rest/sleep promoted   single patient room provided  Goal: Optimal Comfort and Wellbeing  Outcome: Progressing  Intervention: Monitor Pain and Promote Comfort  Recent Flowsheet Documentation  Taken 5/5/2024 1602 by Saskia Thomas RN  Pain Management Interventions: care clustered  Goal: Readiness for Transition of Care  Outcome: Progressing     Problem: Fall Injury Risk  Goal: Absence of Fall and Fall-Related Injury  Outcome: Progressing  Intervention: Identify and Manage Contributors  Recent Flowsheet Documentation  Taken 5/5/2024 1602 by Saskia Thomas RN  Medication Review/Management: high-risk medications identified  Intervention: Promote Injury-Free Environment  Recent Flowsheet Documentation  Taken 5/5/2024 1602 by Saskia Thomas RN  Safety Promotion/Fall Prevention: activity supervised     Problem: Oral Intake Inadequate  Goal: Improved Oral Intake  Outcome: Progressing     Problem: Comorbidity Management  Goal: Maintenance of COPD Symptom Control  Outcome: Progressing  Intervention: Maintain COPD Symptom Control  Recent Flowsheet Documentation  Taken 5/5/2024 1602 by Saskia Thomas RN  Supportive Measures: active listening utilized  Medication Review/Management: high-risk medications identified  Goal: Maintenance of Seizure Control  Outcome: Progressing  Intervention: Maintain Seizure Symptom Control  Recent Flowsheet Documentation  Taken 5/5/2024 1602 by Saskia Thomas RN  Sensory Stimulation Regulation:   care clustered   quiet environment promoted  Seizure Precautions:   activity supervised   clutter-free environment maintained  Medication Review/Management: high-risk medications identified   Goal Outcome Evaluation:      Plan of Care Reviewed With: patient    Overall Patient Progress: improvingOverall Patient Progress: improving    Outcome  Evaluation: low po intake on days. given iv bolus and started maintenance iv per orders and urine output increased. sleepy first half of shift and more awake later in evening. took meds with applesauce. ex  present and supportive. turned with assistance. nebs per RT.

## 2024-05-06 NOTE — PLAN OF CARE
" Goal Outcome Evaluation:  Patient is alert and oriented. Has difficulty communicating. Can answer yes/no . On 2 Liters NC with 02 sat above 95%. Denied pain. No SOB noted. VSS. On bedrest. Reposition. Has a pure wick in place. IV is running NS at 100 ml/hr. Patient is on regular diet and needs help with feeding./55 (BP Location: Right arm)   Pulse 94   Temp 98.2  F (36.8  C) (Oral)   Resp 16   Wt 72.6 kg (160 lb 0.9 oz)   SpO2 93%   BMI 25.07 kg/m       Problem: Adult Inpatient Plan of Care  Goal: Plan of Care Review  Description: The Plan of Care Review/Shift note should be completed every shift.  The Outcome Evaluation is a brief statement about your assessment that the patient is improving, declining, or no change.  This information will be displayed automatically on your shift  note.  Outcome: Progressing  Goal: Patient-Specific Goal (Individualized)  Description: You can add care plan individualizations to a care plan. Examples of Individualization might be:  \"Parent requests to be called daily at 9am for status\", \"I have a hard time hearing out of my right ear\", or \"Do not touch me to wake me up as it startles  me\".  Outcome: Progressing  Goal: Absence of Hospital-Acquired Illness or Injury  Outcome: Progressing  Intervention: Identify and Manage Fall Risk  Recent Flowsheet Documentation  Taken 5/6/2024 0320 by Kaleigh Waterman RN  Safety Promotion/Fall Prevention: activity supervised  Intervention: Prevent Skin Injury  Recent Flowsheet Documentation  Taken 5/6/2024 0320 by Kaleigh Waterman RN  Body Position:   supine, head elevated   neutral body alignment  Skin Protection: adhesive use limited  Device Skin Pressure Protection:   adhesive use limited   pressure points protected  Intervention: Prevent and Manage VTE (Venous Thromboembolism) Risk  Recent Flowsheet Documentation  Taken 5/6/2024 0320 by Kaleigh Waterman RN  VTE Prevention/Management: SCDs (sequential compression devices) off  Intervention: " Prevent Infection  Recent Flowsheet Documentation  Taken 5/6/2024 0320 by Kaleigh Waterman RN  Infection Prevention:   hand hygiene promoted   rest/sleep promoted   single patient room provided  Goal: Optimal Comfort and Wellbeing  Outcome: Progressing  Goal: Readiness for Transition of Care  Outcome: Progressing     Problem: Fall Injury Risk  Goal: Absence of Fall and Fall-Related Injury  Outcome: Progressing  Intervention: Identify and Manage Contributors  Recent Flowsheet Documentation  Taken 5/6/2024 0320 by Kaleigh Waterman RN  Medication Review/Management: high-risk medications identified  Intervention: Promote Injury-Free Environment  Recent Flowsheet Documentation  Taken 5/6/2024 0320 by Kaleigh Waterman RN  Safety Promotion/Fall Prevention: activity supervised     Problem: Oral Intake Inadequate  Goal: Improved Oral Intake  Outcome: Progressing     Problem: Comorbidity Management  Goal: Maintenance of COPD Symptom Control  Outcome: Progressing  Intervention: Maintain COPD Symptom Control  Recent Flowsheet Documentation  Taken 5/6/2024 0320 by Kaleigh Waterman RN  Supportive Measures: active listening utilized  Medication Review/Management: high-risk medications identified  Goal: Maintenance of Seizure Control  Outcome: Progressing  Intervention: Maintain Seizure Symptom Control  Recent Flowsheet Documentation  Taken 5/6/2024 0320 by Kaleigh Waterman RN  Sensory Stimulation Regulation:   care clustered   quiet environment promoted  Seizure Precautions:   activity supervised   clutter-free environment maintained  Medication Review/Management: high-risk medications identified

## 2024-05-06 NOTE — PROGRESS NOTES
Care Management Follow Up    Length of Stay (days): 1    Expected Discharge Date: 05/07/2024     Concerns to be Addressed: care coordination/care conferences, discharge planning     Patient plan of care discussed at interdisciplinary rounds: Yes    Anticipated Discharge Disposition: Return to [facility]     Anticipated Discharge Services: Transportation  Anticipated Discharge DME: monitor for new O2     Education Provided on the Discharge Plan: Yes      Additional Information:  RN SHANIKA/CAMRON is following/consulted for discharge planning. Per chart review, pt resides in an Assisted Living Facility. Spoke to Tatum Soto RN to verify pt s residence at [facility]. Services were verified.     Patient receives services as follows: [facility]. She is assist of two with all checks, changing, dressing. She needs to be fed and is not on a modified diet. She is a teresa lift with assist of two    [facility] contact (name/number): [phone] (this is the main number but will ring through to the nurse who is present 24/7)  Fax #: [phone]  Barriers to pt returning: none  Baseline mobility: assist of 2 with teresa lift  Time of preferred return: after 1000 and before 1700  New meds/prescriptions/Pharmacy: [facility]  Transportation: TBD, likely agency    Other: Patient has had HC in the past and this facility uses [facility]. Patient is currently getting therapy from an outside contracted company arranged by her ex spouse. She is unsure of agency    RN CC/CAMRON will continue to follow for discharge planning and return to facility.            uYdy Willoughby RN BSN CM  Inpatient Care Coordination  [facility]  [phone]

## 2024-05-06 NOTE — PROGRESS NOTES
[facility]    Medicine Progress Note - Hospitalist Service    Date of Admission:  5/5/2024    Assessment & Plan    Olga Bailey is a 78 year old female admitted on 5/5/2024. She has a history of left frontoparietal glioblastoma s/p resection and treated with chemotherapy and radiation with remission in 2021 and subsequent recurrence in 2023, partial seizures and chronic Broca's aphasia secondary to glioblastoma, cognitive and functional impairment due to glioblastoma and chemoradiation, DVT, depression who is presently at a assisted living facility and was brought to the ER due to increased shortness of breath.  She was noted to have increased wheezing and rhonchi.  She has had a cough for a week however no fever.  At baseline she is not on home oxygen and uses a wheelchair.     Acute hypoxic respiratory failure  Possible aspiration vs reactive airway vs atelectasis or pneumonitis  Hx restrictive lung disease  -With wheezing likely due to reactive airway disease as she has no formal diagnosis of COPD.  -She does have underlying restrictive lung disease.  -CT chest without contrast shows scattered groundglass opacity which could be seen in small airway disease, infection or pulmonary edema is also thickening with scattered areas of mucoid impaction.  -seen by ST and did well with swallow study, tolerating diet  -cont prednisone, plan for 5-7 day course. Cont nebs  -rocephin started 5/5, will continue for short course  -RT following, start CPT and mucinex and evaluate response  -currently on 2L NC, not on oxygen at baseline. Wean down as able  -stop fluids, encourage PO intake    History of DVT  -resumed home xarelto     History of glioblastoma  Partial seziures  -S/p chemotherapy, resection and radiation and now has completed reirradiation.   -Is now presently in remission per her   -resume home keppra, vimpat,      Anemia  -Stable likely anemia of chronic disease.        Diet:  Regular Diet Adult    DVT Prophylaxis: DOAC  Martino Catheter: Not present  Lines: None     Cardiac Monitoring: None  Code Status: Full Code        Disposition Plan     Medically Ready for Discharge: Anticipated in 2-4 Days         Jayson Sheikh DO  Hospitalist Service  [facility]  Securely message with [employer] (more info)  Text page via [employer] Paging/Directory   ______________________________________________________________________    Interval History   No overnight events, no fever or chills. Still on small amount of oxygen. Coughing up occasionally. Did well with ST and tolerating diet ok. Imaging with questionable mucoid impaction, CPT vest ordered    Physical Exam   Vital Signs: Temp: 98  F (36.7  C) Temp src: Oral BP: 124/63 Pulse: 80   Resp: 18 SpO2: 96 % O2 Device: Nasal cannula Oxygen Delivery: 2 LPM  Weight: 182 lbs 12.18 oz    Constitutional: fatigued but awakens to voice, cooperative  Eyes: pupils equal, round and reactive to light and conjunctiva normal  ENT: normocephalic, without obvious abnormality, atraumatic  Respiratory: difficult exam but anteriorly clear but diminished, no wheezing  Cardiovascular: regular rate and rhythm and no murmur noted  GI: normal bowel sounds, soft, non-distended, and non-tender  Skin: no bruising or bleeding  Neurologic: awakens easily but speech slow,, moves all extremities but chronic deficits noted    45 MINUTES SPENT BY ME on the date of service doing chart review, history, exam, documentation & further activities per the note.      Data   ------------------------- PAST 24 HR DATA REVIEWED -----------------------------------------------    I have personally reviewed the following data over the past 24 hrs:    7.0  \   9.3 (L)   / 217     145 110 (H) 16.7 /  102 (H)   4.1 25 0.69 \     Trop: 13 BNP: N/A       Imaging results reviewed over the past 24 hrs:   No results found for this or any previous visit (from the past 24 hour(s)).

## 2024-05-07 PROCEDURE — 99232 SBSQ HOSP IP/OBS MODERATE 35: CPT | Performed by: HOSPITALIST

## 2024-05-07 PROCEDURE — 94640 AIRWAY INHALATION TREATMENT: CPT | Mod: 76

## 2024-05-07 PROCEDURE — 94640 AIRWAY INHALATION TREATMENT: CPT

## 2024-05-07 PROCEDURE — 250N000013 HC RX MED GY IP 250 OP 250 PS 637: Performed by: HOSPITALIST

## 2024-05-07 PROCEDURE — 999N000157 HC STATISTIC RCP TIME EA 10 MIN

## 2024-05-07 PROCEDURE — 94799 UNLISTED PULMONARY SVC/PX: CPT

## 2024-05-07 PROCEDURE — 250N000009 HC RX 250: Performed by: INTERNAL MEDICINE

## 2024-05-07 PROCEDURE — 250N000011 HC RX IP 250 OP 636: Performed by: HOSPITALIST

## 2024-05-07 PROCEDURE — 120N000001 HC R&B MED SURG/OB

## 2024-05-07 PROCEDURE — 250N000013 HC RX MED GY IP 250 OP 250 PS 637: Performed by: INTERNAL MEDICINE

## 2024-05-07 PROCEDURE — 250N000012 HC RX MED GY IP 250 OP 636 PS 637: Performed by: INTERNAL MEDICINE

## 2024-05-07 RX ADMIN — AMOXICILLIN AND CLAVULANATE POTASSIUM 1 TABLET: 875; 125 TABLET, FILM COATED ORAL at 20:36

## 2024-05-07 RX ADMIN — AMLODIPINE BESYLATE 5 MG: 5 TABLET ORAL at 08:58

## 2024-05-07 RX ADMIN — IPRATROPIUM BROMIDE AND ALBUTEROL SULFATE 3 ML: .5; 3 SOLUTION RESPIRATORY (INHALATION) at 19:52

## 2024-05-07 RX ADMIN — RIVAROXABAN 20 MG: 20 TABLET, FILM COATED ORAL at 16:15

## 2024-05-07 RX ADMIN — LEVETIRACETAM 750 MG: 250 TABLET, FILM COATED ORAL at 20:35

## 2024-05-07 RX ADMIN — CEFTRIAXONE 1 G: 1 INJECTION, POWDER, FOR SOLUTION INTRAMUSCULAR; INTRAVENOUS at 11:35

## 2024-05-07 RX ADMIN — GUAIFENESIN 600 MG: 600 TABLET, EXTENDED RELEASE ORAL at 08:53

## 2024-05-07 RX ADMIN — FLUOXETINE HYDROCHLORIDE 20 MG: 20 CAPSULE ORAL at 08:53

## 2024-05-07 RX ADMIN — LACOSAMIDE 50 MG: 50 TABLET, FILM COATED ORAL at 20:36

## 2024-05-07 RX ADMIN — LEVETIRACETAM 750 MG: 250 TABLET, FILM COATED ORAL at 08:54

## 2024-05-07 RX ADMIN — IPRATROPIUM BROMIDE AND ALBUTEROL SULFATE 3 ML: .5; 3 SOLUTION RESPIRATORY (INHALATION) at 08:20

## 2024-05-07 RX ADMIN — PANTOPRAZOLE SODIUM 40 MG: 40 TABLET, DELAYED RELEASE ORAL at 08:54

## 2024-05-07 RX ADMIN — IPRATROPIUM BROMIDE AND ALBUTEROL SULFATE 3 ML: .5; 3 SOLUTION RESPIRATORY (INHALATION) at 11:52

## 2024-05-07 RX ADMIN — IPRATROPIUM BROMIDE AND ALBUTEROL SULFATE 3 ML: .5; 3 SOLUTION RESPIRATORY (INHALATION) at 15:35

## 2024-05-07 RX ADMIN — GUAIFENESIN 600 MG: 600 TABLET, EXTENDED RELEASE ORAL at 20:35

## 2024-05-07 RX ADMIN — PANTOPRAZOLE SODIUM 40 MG: 40 TABLET, DELAYED RELEASE ORAL at 16:15

## 2024-05-07 RX ADMIN — LACOSAMIDE 50 MG: 50 TABLET, FILM COATED ORAL at 08:53

## 2024-05-07 RX ADMIN — PREDNISONE 40 MG: 20 TABLET ORAL at 08:53

## 2024-05-07 ASSESSMENT — ACTIVITIES OF DAILY LIVING (ADL)
ADLS_ACUITY_SCORE: 75
ADLS_ACUITY_SCORE: 71
ADLS_ACUITY_SCORE: 75
ADLS_ACUITY_SCORE: 71
ADLS_ACUITY_SCORE: 75
ADLS_ACUITY_SCORE: 75
ADLS_ACUITY_SCORE: 71
ADLS_ACUITY_SCORE: 75
ADLS_ACUITY_SCORE: 71

## 2024-05-07 NOTE — PLAN OF CARE
"Lethargic at times, ease of arousability varies. Confused. Disoriented time, place, situation. Short sentences/answers y/n questions. Denies pain. RA, satting low 90s. PureWick in place. Assist x2 w/ lift per report, not OOB this shift. Plan: SW following for discharge, probably back to [facility].        Plan of Care Reviewed With: patient, spouse    Overall Patient Progress: improvingOverall Patient Progress: improving    Outcome Evaluation: More awake and able to speak in short sentences. RA, sats low 90s      Problem: Adult Inpatient Plan of Care  Goal: Plan of Care Review  Description: The Plan of Care Review/Shift note should be completed every shift.  The Outcome Evaluation is a brief statement about your assessment that the patient is improving, declining, or no change.  This information will be displayed automatically on your shift  note.  Outcome: Progressing  Flowsheets (Taken 5/6/2024 2232)  Outcome Evaluation: More awake and able to speak in short sentences. SHERRI sats low 90s  Plan of Care Reviewed With:   patient   spouse  Overall Patient Progress: improving  Goal: Patient-Specific Goal (Individualized)  Description: You can add care plan individualizations to a care plan. Examples of Individualization might be:  \"Parent requests to be called daily at 9am for status\", \"I have a hard time hearing out of my right ear\", or \"Do not touch me to wake me up as it startles  me\".  Outcome: Progressing  Goal: Absence of Hospital-Acquired Illness or Injury  Outcome: Progressing  Intervention: Identify and Manage Fall Risk  Recent Flowsheet Documentation  Taken 5/6/2024 1656 by Tanya Smith RN  Safety Promotion/Fall Prevention:   supervised activity   safety round/check completed   room organization consistent   lighting adjusted  Intervention: Prevent Skin Injury  Recent Flowsheet Documentation  Taken 5/6/2024 1656 by Tanya Smith RN  Body Position:   heels elevated   log-rolled   supine, head elevated  Goal: Optimal " Comfort and Wellbeing  Outcome: Progressing  Goal: Readiness for Transition of Care  Outcome: Progressing     Problem: Fall Injury Risk  Goal: Absence of Fall and Fall-Related Injury  Outcome: Progressing  Intervention: Identify and Manage Contributors  Recent Flowsheet Documentation  Taken 5/6/2024 1656 by Tanya Smith RN  Medication Review/Management: medications reviewed  Intervention: Promote Injury-Free Environment  Recent Flowsheet Documentation  Taken 5/6/2024 1656 by Tanya Smith RN  Safety Promotion/Fall Prevention:   supervised activity   safety round/check completed   room organization consistent   lighting adjusted     Problem: Oral Intake Inadequate  Goal: Improved Oral Intake  Outcome: Progressing     Problem: Comorbidity Management  Goal: Maintenance of COPD Symptom Control  Outcome: Progressing  Intervention: Maintain COPD Symptom Control  Recent Flowsheet Documentation  Taken 5/6/2024 1656 by Tanya Smith RN  Medication Review/Management: medications reviewed  Goal: Maintenance of Seizure Control  Outcome: Progressing  Intervention: Maintain Seizure Symptom Control  Recent Flowsheet Documentation  Taken 5/6/2024 1656 by Tanya Smith RN  Medication Review/Management: medications reviewed

## 2024-05-07 NOTE — PROGRESS NOTES
[facility]    Medicine Progress Note - Hospitalist Service    Date of Admission:  5/5/2024    Assessment & Plan   Olga Bailey is a 78 year old female admitted on 5/5/2024. She has a history of left frontoparietal glioblastoma s/p resection and treated with chemotherapy and radiation with remission in 2021 and subsequent recurrence in 2023, partial seizures and chronic Broca's aphasia secondary to glioblastoma, cognitive and functional impairment due to glioblastoma and chemoradiation, DVT, depression who is presently at a assisted living facility and was brought to the ER due to increased shortness of breath.  She was noted to have increased wheezing and rhonchi.  She has had a cough for a week however no fever.  At baseline she is not on home oxygen and uses a wheelchair.     Acute hypoxic respiratory failure  Possible aspiration vs reactive airway vs atelectasis or pneumonitis  Hx restrictive lung disease  -With wheezing likely due to reactive airway disease as she has no formal diagnosis of COPD.  -She does have underlying restrictive lung disease.  -CT chest without contrast shows scattered groundglass opacity which could be seen in small airway disease, infection or pulmonary edema is also thickening with scattered areas of mucoid impaction.  -seen by ST and did well with swallow study, tolerating diet  -cont prednisone, plan for 5-7 day course. Cont nebs  -rocephin started 5/5, transition to oral augmentin today with play for 5-7 day course  -RT following, started CPT and mucinex and evaluate response  -now off oxygen, possible discharge back to facility tomorrow    History of DVT  -resumed home xarelto     History of glioblastoma  Partial seziures  -S/p chemotherapy, resection and radiation and now has completed reirradiation.   -Is now presently in remission per her   -resume home keppra, vimpat,      Anemia  -Stable likely anemia of chronic disease.          Diet: Regular  Diet Adult    DVT Prophylaxis: DOAC  Martino Catheter: Not present  Lines: None     Cardiac Monitoring: None  Code Status: Full Code        Disposition Plan     Medically Ready for Discharge: Anticipated Tomorrow         Jayson Sheikh DO  Hospitalist Service  [facility]  Securely message with [employer] (more info)  Text page via [employer] Paging/Directory   ______________________________________________________________________    Interval History   No overnight events, is off oxygen now. Still with cough, difficult interview but denies fever. No sob    Physical Exam   Vital Signs: Temp: 98.1  F (36.7  C) Temp src: Oral BP: 133/67 Pulse: 80   Resp: 18 SpO2: 93 % O2 Device: None (Room air)    Weight: 182 lbs 12.18 oz  Constitutional: fatigued but awakens to voice, cooperative  Eyes: pupils equal, round and reactive to light and conjunctiva normal  ENT: normocephalic, without obvious abnormality, atraumatic  Respiratory: difficult exam but anteriorly clear but diminished, no wheezing  Cardiovascular: regular rate and rhythm and no murmur noted  GI: normal bowel sounds, soft, non-distended, and non-tender  Skin: no bruising or bleeding  Neurologic: awakens easily but speech slow,, moves all extremities but chronic deficits noted    45 MINUTES SPENT BY ME on the date of service doing chart review, history, exam, documentation & further activities per the note.      Data   ------------------------- PAST 24 HR DATA REVIEWED -----------------------------------------------        Imaging results reviewed over the past 24 hrs:   No results found for this or any previous visit (from the past 24 hour(s)).

## 2024-05-07 NOTE — PROGRESS NOTES
Care Management Follow Up    Length of Stay (days): 2    Expected Discharge Date: 05/08/2024     Concerns to be Addressed: care coordination/care conferences, discharge planning     Patient plan of care discussed at interdisciplinary rounds: Yes    Anticipated Discharge Disposition: Other (Comments) (TBD)     Anticipated Discharge Services: None  Anticipated Discharge DME:      Patient/family educated on Medicare website which has current facility and service quality ratings:    Education Provided on the Discharge Plan: Yes  Patient/Family in Agreement with the Plan:      Referrals Placed by CM/SW:    Private pay costs discussed: Not applicable    Additional Information:  Provided update to senior care. They prefer orders 2 hours early.     MACARIO Lima, [facility]  [facility]   Please contact the SW on the floor in which the patient is staying for any questions or concerns

## 2024-05-07 NOTE — PLAN OF CARE
"Pt is confused. On room air. Denies pain. Assist of 2 with lift. Has a purewick on. Bruises in forearm. Regular diet. Needs help feeding. Takes meds whole one at a time with applesauce. Total cares. Needs help ordering and feeding. PIV- SL. Social work following.     Problem: Adult Inpatient Plan of Care  Goal: Plan of Care Review  Description: The Plan of Care Review/Shift note should be completed every shift.  The Outcome Evaluation is a brief statement about your assessment that the patient is improving, declining, or no change.  This information will be displayed automatically on your shift  note.  Outcome: Progressing  Flowsheets (Taken 5/7/2024 0709)  Outcome Evaluation: pt is alert. Let sided weakness. Incontinent of bowel and bladder.  Plan of Care Reviewed With: patient  Overall Patient Progress: improving  Goal: Patient-Specific Goal (Individualized)  Description: You can add care plan individualizations to a care plan. Examples of Individualization might be:  \"Parent requests to be called daily at 9am for status\", \"I have a hard time hearing out of my right ear\", or \"Do not touch me to wake me up as it startles  me\".  Outcome: Progressing  Goal: Absence of Hospital-Acquired Illness or Injury  Outcome: Progressing  Intervention: Identify and Manage Fall Risk  Recent Flowsheet Documentation  Taken 5/7/2024 0451 by Margoth Ferreira, RN  Safety Promotion/Fall Prevention:   safety round/check completed   room organization consistent   room near nurse's station   mobility aid in reach   lighting adjusted   increase visualization of patient  Intervention: Prevent Skin Injury  Recent Flowsheet Documentation  Taken 5/7/2024 0451 by Margoth Ferreira, RN  Body Position:   right   turned  Intervention: Prevent and Manage VTE (Venous Thromboembolism) Risk  Recent Flowsheet Documentation  Taken 5/7/2024 0451 by Margoth Ferreira, RN  VTE Prevention/Management: SCDs (sequential compression devices) off  Goal: Optimal Comfort " and Wellbeing  Outcome: Progressing  Goal: Readiness for Transition of Care  Outcome: Progressing     Problem: Fall Injury Risk  Goal: Absence of Fall and Fall-Related Injury  Outcome: Progressing  Intervention: Identify and Manage Contributors  Recent Flowsheet Documentation  Taken 5/7/2024 0451 by Margoth Ferreira RN  Medication Review/Management: medications reviewed  Intervention: Promote Injury-Free Environment  Recent Flowsheet Documentation  Taken 5/7/2024 0451 by Margoth Ferreira RN  Safety Promotion/Fall Prevention:   safety round/check completed   room organization consistent   room near nurse's station   mobility aid in reach   lighting adjusted   increase visualization of patient     Problem: Oral Intake Inadequate  Goal: Improved Oral Intake  Outcome: Progressing     Problem: Comorbidity Management  Goal: Maintenance of COPD Symptom Control  Outcome: Progressing  Intervention: Maintain COPD Symptom Control  Recent Flowsheet Documentation  Taken 5/7/2024 0451 by Margoth Ferreira RN  Medication Review/Management: medications reviewed  Goal: Maintenance of Seizure Control  Outcome: Progressing  Intervention: Maintain Seizure Symptom Control  Recent Flowsheet Documentation  Taken 5/7/2024 0451 by Margoth Ferreira RN  Medication Review/Management: medications reviewed     Problem: Adult Inpatient Plan of Care  Goal: Plan of Care Review  Description: The Plan of Care Review/Shift note should be completed every shift.  The Outcome Evaluation is a brief statement about your assessment that the patient is improving, declining, or no change.  This information will be displayed automatically on your shift  note.  Outcome: Progressing  Flowsheets (Taken 5/7/2024 0709)  Outcome Evaluation: pt is alert. Let sided weakness. Incontinent of bowel and bladder.  Plan of Care Reviewed With: patient  Overall Patient Progress: improving  Goal: Patient-Specific Goal (Individualized)  Description: You can add care plan  "individualizations to a care plan. Examples of Individualization might be:  \"Parent requests to be called daily at 9am for status\", \"I have a hard time hearing out of my right ear\", or \"Do not touch me to wake me up as it startles  me\".  Outcome: Progressing  Goal: Absence of Hospital-Acquired Illness or Injury  Outcome: Progressing  Intervention: Identify and Manage Fall Risk  Recent Flowsheet Documentation  Taken 5/7/2024 0451 by Margoth Ferreira RN  Safety Promotion/Fall Prevention:   safety round/check completed   room organization consistent   room near nurse's station   mobility aid in reach   lighting adjusted   increase visualization of patient  Intervention: Prevent Skin Injury  Recent Flowsheet Documentation  Taken 5/7/2024 0451 by Margoth Ferreira RN  Body Position:   right   turned  Intervention: Prevent and Manage VTE (Venous Thromboembolism) Risk  Recent Flowsheet Documentation  Taken 5/7/2024 0451 by Margoth Ferreira RN  VTE Prevention/Management: SCDs (sequential compression devices) off  Goal: Optimal Comfort and Wellbeing  Outcome: Progressing  Goal: Readiness for Transition of Care  Outcome: Progressing     Problem: Fall Injury Risk  Goal: Absence of Fall and Fall-Related Injury  Outcome: Progressing  Intervention: Identify and Manage Contributors  Recent Flowsheet Documentation  Taken 5/7/2024 0451 by Margoth Ferreira RN  Medication Review/Management: medications reviewed  Intervention: Promote Injury-Free Environment  Recent Flowsheet Documentation  Taken 5/7/2024 0451 by Margoth Ferreira RN  Safety Promotion/Fall Prevention:   safety round/check completed   room organization consistent   room near nurse's station   mobility aid in reach   lighting adjusted   increase visualization of patient     Problem: Oral Intake Inadequate  Goal: Improved Oral Intake  Outcome: Progressing     Problem: Comorbidity Management  Goal: Maintenance of COPD Symptom Control  Outcome: Progressing  Intervention: Maintain " COPD Symptom Control  Recent Flowsheet Documentation  Taken 5/7/2024 0451 by Margoth Ferreira, RN  Medication Review/Management: medications reviewed  Goal: Maintenance of Seizure Control  Outcome: Progressing  Intervention: Maintain Seizure Symptom Control  Recent Flowsheet Documentation  Taken 5/7/2024 0451 by Margoth Ferreira, RN  Medication Review/Management: medications reviewed   Goal Outcome Evaluation:      Plan of Care Reviewed With: patient    Overall Patient Progress: improvingOverall Patient Progress: improving    Outcome Evaluation: pt is alert. Let sided weakness. Incontinent of bowel and bladder.

## 2024-05-08 ENCOUNTER — APPOINTMENT (OUTPATIENT)
Dept: CT IMAGING | Facility: CLINIC | Age: 79
DRG: 177 | End: 2024-05-08
Attending: HOSPITALIST
Payer: MEDICARE

## 2024-05-08 ENCOUNTER — APPOINTMENT (OUTPATIENT)
Dept: SPEECH THERAPY | Facility: CLINIC | Age: 79
DRG: 177 | End: 2024-05-08
Payer: MEDICARE

## 2024-05-08 LAB
ERYTHROCYTE [DISTWIDTH] IN BLOOD BY AUTOMATED COUNT: 14.7 % (ref 10–15)
GLUCOSE BLDC GLUCOMTR-MCNC: 92 MG/DL (ref 70–99)
HCT VFR BLD AUTO: 32.3 % (ref 35–47)
HGB BLD-MCNC: 10.1 G/DL (ref 11.7–15.7)
MCH RBC QN AUTO: 26.6 PG (ref 26.5–33)
MCHC RBC AUTO-ENTMCNC: 31.3 G/DL (ref 31.5–36.5)
MCV RBC AUTO: 85 FL (ref 78–100)
PLATELET # BLD AUTO: 216 10E3/UL (ref 150–450)
RBC # BLD AUTO: 3.79 10E6/UL (ref 3.8–5.2)
WBC # BLD AUTO: 7.4 10E3/UL (ref 4–11)

## 2024-05-08 PROCEDURE — 999N000157 HC STATISTIC RCP TIME EA 10 MIN

## 2024-05-08 PROCEDURE — 250N000012 HC RX MED GY IP 250 OP 636 PS 637: Performed by: INTERNAL MEDICINE

## 2024-05-08 PROCEDURE — 999N000156 HC STATISTIC RCP CONSULT EA 30 MIN

## 2024-05-08 PROCEDURE — 250N000013 HC RX MED GY IP 250 OP 250 PS 637: Performed by: HOSPITALIST

## 2024-05-08 PROCEDURE — 120N000001 HC R&B MED SURG/OB

## 2024-05-08 PROCEDURE — 94640 AIRWAY INHALATION TREATMENT: CPT | Mod: 76

## 2024-05-08 PROCEDURE — 80235 DRUG ASSAY LACOSAMIDE: CPT | Performed by: HOSPITALIST

## 2024-05-08 PROCEDURE — 85027 COMPLETE CBC AUTOMATED: CPT | Performed by: HOSPITALIST

## 2024-05-08 PROCEDURE — 250N000009 HC RX 250: Performed by: INTERNAL MEDICINE

## 2024-05-08 PROCEDURE — 70450 CT HEAD/BRAIN W/O DYE: CPT | Mod: MG

## 2024-05-08 PROCEDURE — 94640 AIRWAY INHALATION TREATMENT: CPT

## 2024-05-08 PROCEDURE — 36415 COLL VENOUS BLD VENIPUNCTURE: CPT | Performed by: HOSPITALIST

## 2024-05-08 PROCEDURE — 99232 SBSQ HOSP IP/OBS MODERATE 35: CPT | Performed by: HOSPITALIST

## 2024-05-08 PROCEDURE — 250N000013 HC RX MED GY IP 250 OP 250 PS 637: Performed by: INTERNAL MEDICINE

## 2024-05-08 PROCEDURE — 250N000013 HC RX MED GY IP 250 OP 250 PS 637: Performed by: PSYCHIATRY & NEUROLOGY

## 2024-05-08 PROCEDURE — 92526 ORAL FUNCTION THERAPY: CPT | Mod: GN

## 2024-05-08 PROCEDURE — 258N000003 HC RX IP 258 OP 636: Performed by: HOSPITALIST

## 2024-05-08 RX ORDER — SODIUM CHLORIDE 9 MG/ML
INJECTION, SOLUTION INTRAVENOUS CONTINUOUS
Status: DISCONTINUED | OUTPATIENT
Start: 2024-05-08 | End: 2024-05-10 | Stop reason: HOSPADM

## 2024-05-08 RX ADMIN — SODIUM CHLORIDE: 9 INJECTION, SOLUTION INTRAVENOUS at 10:58

## 2024-05-08 RX ADMIN — GUAIFENESIN 600 MG: 600 TABLET, EXTENDED RELEASE ORAL at 20:59

## 2024-05-08 RX ADMIN — Medication 75 MG: at 20:59

## 2024-05-08 RX ADMIN — SODIUM CHLORIDE: 9 INJECTION, SOLUTION INTRAVENOUS at 20:37

## 2024-05-08 RX ADMIN — AMOXICILLIN AND CLAVULANATE POTASSIUM 1 TABLET: 875; 125 TABLET, FILM COATED ORAL at 20:58

## 2024-05-08 RX ADMIN — IPRATROPIUM BROMIDE AND ALBUTEROL SULFATE 3 ML: .5; 3 SOLUTION RESPIRATORY (INHALATION) at 12:08

## 2024-05-08 RX ADMIN — LACOSAMIDE 50 MG: 50 TABLET, FILM COATED ORAL at 12:33

## 2024-05-08 RX ADMIN — IPRATROPIUM BROMIDE AND ALBUTEROL SULFATE 3 ML: .5; 3 SOLUTION RESPIRATORY (INHALATION) at 16:19

## 2024-05-08 RX ADMIN — LEVETIRACETAM 750 MG: 250 TABLET, FILM COATED ORAL at 10:06

## 2024-05-08 RX ADMIN — GUAIFENESIN 600 MG: 600 TABLET, EXTENDED RELEASE ORAL at 10:43

## 2024-05-08 RX ADMIN — FLUOXETINE HYDROCHLORIDE 20 MG: 20 CAPSULE ORAL at 10:44

## 2024-05-08 RX ADMIN — AMOXICILLIN AND CLAVULANATE POTASSIUM 1 TABLET: 875; 125 TABLET, FILM COATED ORAL at 10:44

## 2024-05-08 RX ADMIN — IPRATROPIUM BROMIDE AND ALBUTEROL SULFATE 3 ML: .5; 3 SOLUTION RESPIRATORY (INHALATION) at 08:16

## 2024-05-08 RX ADMIN — PANTOPRAZOLE SODIUM 40 MG: 40 TABLET, DELAYED RELEASE ORAL at 10:43

## 2024-05-08 RX ADMIN — AMLODIPINE BESYLATE 5 MG: 5 TABLET ORAL at 10:43

## 2024-05-08 RX ADMIN — LEVETIRACETAM 750 MG: 250 TABLET, FILM COATED ORAL at 20:55

## 2024-05-08 RX ADMIN — SENNOSIDES AND DOCUSATE SODIUM 2 TABLET: 8.6; 5 TABLET ORAL at 12:34

## 2024-05-08 RX ADMIN — PREDNISONE 40 MG: 20 TABLET ORAL at 10:41

## 2024-05-08 RX ADMIN — IPRATROPIUM BROMIDE AND ALBUTEROL SULFATE 3 ML: .5; 3 SOLUTION RESPIRATORY (INHALATION) at 19:54

## 2024-05-08 RX ADMIN — RIVAROXABAN 20 MG: 20 TABLET, FILM COATED ORAL at 21:00

## 2024-05-08 ASSESSMENT — ACTIVITIES OF DAILY LIVING (ADL)
ADLS_ACUITY_SCORE: 75
ADLS_ACUITY_SCORE: 79
ADLS_ACUITY_SCORE: 75
ADLS_ACUITY_SCORE: 79
ADLS_ACUITY_SCORE: 73

## 2024-05-08 NOTE — PROVIDER NOTIFICATION
Peg CHAN: I gave lacosimide about one hour ago, pt sleepy but arouses to voice. O2 sats went down to 86% while patient sleeping, have patient on 2 liters now, not sure if its just from lethargy. also have kept bladder scanning her and its over 400 cc now, should i straight cath? also patient on tele but does not have order     Addendum: provider said to straight cath

## 2024-05-08 NOTE — PLAN OF CARE
"VSS.  A&Ox1 on RA with sats at 92%.  Rocephin provided.  Pt transitioning to oral Abx.  Regular diet.  Total cares.  Turn and reposition Q2.  Denies pain.  PIV in right arm SL.     Goal Outcome Evaluation:      Plan of Care Reviewed With: patient and Spouse    Overall Patient Progress: improvingOverall Patient Progress: improving    Outcome Evaluation: Pt A&O to self only with some right sided weakness, repositioned Q2, able to make needs known with one word answers, and seizure pads in place.    Problem: Adult Inpatient Plan of Care  Goal: Patient-Specific Goal (Individualized)  Description: You can add care plan individualizations to a care plan. Examples of Individualization might be:  \"Parent requests to be called daily at 9am for status\", \"I have a hard time hearing out of my right ear\", or \"Do not touch me to wake me up as it startles  me\".  5/7/2024 2031 by Curt Rodriguez RN  Outcome: Progressing  Goal: Absence of Hospital-Acquired Illness or Injury  5/7/2024 2031 by Curt Rodriguez RN  Outcome: Progressing  Intervention: Prevent Skin Injury  Recent Flowsheet Documentation  Taken 5/7/2024 1457 by Curt Rodriguez RN  Body Position:   turned   left  Taken 5/7/2024 1233 by Curt Rodriguez RN  Body Position: log-rolled  Taken 5/7/2024 1034 by Curt Rodriguez RN  Body Position:   log-rolled   turned   right  Taken 5/7/2024 0853 by Curt Rodriguez RN  Skin Protection: adhesive use limited  Intervention: Prevent and Manage VTE (Venous Thromboembolism) Risk  Recent Flowsheet Documentation  Taken 5/7/2024 0853 by Curt Rodriguez RN  VTE Prevention/Management: SCDs (sequential compression devices) off  Intervention: Prevent Infection  Recent Flowsheet Documentation  Taken 5/7/2024 0853 by Curt Rodriguez RN  Infection Prevention: equipment surfaces disinfected  Goal: Optimal Comfort and Wellbeing  5/7/2024 2031 by Curt Rodriguez RN  Outcome: Progressing  Goal: Readiness for Transition of Care  5/7/2024 2031 by Curt Rodriguez, " RN  Outcome: Progressing     Problem: Fall Injury Risk  Goal: Absence of Fall and Fall-Related Injury  5/7/2024 2031 by Curt Rodriguez RN  Outcome: Progressing     Problem: Oral Intake Inadequate  Goal: Improved Oral Intake  5/7/2024 2035 by Curt Rodriguez RN  Outcome: Progressing     Problem: Comorbidity Management  Goal: Maintenance of COPD Symptom Control  5/7/2024 2031 by Curt Rodriguez RN  Outcome: Progressing  Intervention: Maintain COPD Symptom Control  Recent Flowsheet Documentation  Taken 5/7/2024 0742 by Curt Rodriguez RN  Supportive Measures:   active listening utilized   positive reinforcement provided   verbalization of feelings encouraged  Goal: Maintenance of Seizure Control  5/7/2024 2031 by Curt Rodriguez RN  Outcome: Progressing  Intervention: Maintain Seizure Symptom Control  Recent Flowsheet Documentation  Taken 5/7/2024 1615 by Curt Rodriguez RN  Sensory Stimulation Regulation: care clustered  Taken 5/7/2024 0853 by Curt Rodriguez RN  Sensory Stimulation Regulation:   care clustered   quiet environment promoted

## 2024-05-08 NOTE — PLAN OF CARE
"Goal Outcome Evaluation:    Pt. Alert to self, lethargic overnight, slight right sided weakness, hx of right facial droop, up with lift, turned/repositioned Q2, Tele: SR with prolong QT, Plan: Prednisone, nebs, Augmentin, chest physiotherapy.     Problem: Adult Inpatient Plan of Care  Goal: Plan of Care Review  Description: The Plan of Care Review/Shift note should be completed every shift.  The Outcome Evaluation is a brief statement about your assessment that the patient is improving, declining, or no change.  This information will be displayed automatically on your shift  note.  5/8/2024 0537 by Nay Camarena RN  Outcome: Progressing  Flowsheets (Taken 5/8/2024 0537)  Outcome Evaluation: Pt. Alert to self, drowsy this morning, slight right sided weakness, hx of right facial droop, up with lift, turned/repositioned Q2, Tele: SR with prolong QT, Plan: Prednisone, nebs, Augmentin, chest physiotherapy, possible discharge today.  Plan of Care Reviewed With: patient  Overall Patient Progress: no change  5/8/2024 0536 by Nay Camarena RN  Outcome: Progressing  Goal: Patient-Specific Goal (Individualized)  Description: You can add care plan individualizations to a care plan. Examples of Individualization might be:  \"Parent requests to be called daily at 9am for status\", \"I have a hard time hearing out of my right ear\", or \"Do not touch me to wake me up as it startles  me\".  5/8/2024 0537 by Nay Camarena RN  Outcome: Progressing  5/8/2024 0536 by Nay Camarena RN  Outcome: Progressing  Goal: Absence of Hospital-Acquired Illness or Injury  5/8/2024 0537 by Nay Camarena RN  Outcome: Progressing  5/8/2024 0536 by Nay Camarena RN  Outcome: Progressing  Intervention: Identify and Manage Fall Risk  Recent Flowsheet Documentation  Taken 5/7/2024 2327 by Nay Camarena RN  Safety Promotion/Fall Prevention:   activity supervised   clutter free environment maintained   increased rounding and observation   " increase visualization of patient   lighting adjusted   nonskid shoes/slippers when out of bed   safety round/check completed  Taken 5/7/2024 2030 by Nay Camarena RN  Safety Promotion/Fall Prevention:   activity supervised   clutter free environment maintained   increased rounding and observation   increase visualization of patient   lighting adjusted   nonskid shoes/slippers when out of bed   safety round/check completed  Intervention: Prevent Skin Injury  Recent Flowsheet Documentation  Taken 5/7/2024 2327 by Nay Camarena RN  Body Position:   turned   left   legs elevated   heels elevated   weight shifting   side-lying 30 degrees  Skin Protection: incontinence pads utilized  Device Skin Pressure Protection: absorbent pad utilized/changed  Taken 5/7/2024 2030 by Nay Camarena RN  Body Position:   turned   right   legs elevated   weight shifting  Skin Protection: incontinence pads utilized  Device Skin Pressure Protection: absorbent pad utilized/changed  Intervention: Prevent and Manage VTE (Venous Thromboembolism) Risk  Recent Flowsheet Documentation  Taken 5/7/2024 2327 by Nay Camarena RN  VTE Prevention/Management: SCDs (sequential compression devices) off  Taken 5/7/2024 2030 by Nay Camarena RN  VTE Prevention/Management: SCDs (sequential compression devices) off  Intervention: Prevent Infection  Recent Flowsheet Documentation  Taken 5/7/2024 2327 by Nay Camarena RN  Infection Prevention:   hand hygiene promoted   rest/sleep promoted  Taken 5/7/2024 2030 by Nay Camarena RN  Infection Prevention:   equipment surfaces disinfected   rest/sleep promoted   hand hygiene promoted  Goal: Optimal Comfort and Wellbeing  5/8/2024 0537 by Nay Camarena RN  Outcome: Progressing  5/8/2024 0536 by Nay Camarena RN  Outcome: Progressing  Goal: Readiness for Transition of Care  5/8/2024 0537 by Nay Camarena RN  Outcome: Progressing  5/8/2024 0536 by Nay Camarena RN  Outcome: Progressing      Problem: Fall Injury Risk  Goal: Absence of Fall and Fall-Related Injury  5/8/2024 0537 by Nay Camarena RN  Outcome: Progressing  5/8/2024 0536 by Nay Camarena RN  Outcome: Progressing  Intervention: Identify and Manage Contributors  Recent Flowsheet Documentation  Taken 5/7/2024 2327 by Nay Camarena RN  Medication Review/Management: medications reviewed  Intervention: Promote Injury-Free Environment  Recent Flowsheet Documentation  Taken 5/7/2024 2327 by Nay Camarena RN  Safety Promotion/Fall Prevention:   activity supervised   clutter free environment maintained   increased rounding and observation   increase visualization of patient   lighting adjusted   nonskid shoes/slippers when out of bed   safety round/check completed  Taken 5/7/2024 2030 by Nay Camarena RN  Safety Promotion/Fall Prevention:   activity supervised   clutter free environment maintained   increased rounding and observation   increase visualization of patient   lighting adjusted   nonskid shoes/slippers when out of bed   safety round/check completed     Problem: Oral Intake Inadequate  Goal: Improved Oral Intake  5/8/2024 0537 by Nay Camarena RN  Outcome: Progressing  5/8/2024 0536 by Nay Camarena RN  Outcome: Progressing     Problem: Comorbidity Management  Goal: Maintenance of COPD Symptom Control  5/8/2024 0537 by Nay Camarena RN  Outcome: Progressing  5/8/2024 0536 by Nay Camarena RN  Outcome: Progressing  Intervention: Maintain COPD Symptom Control  Recent Flowsheet Documentation  Taken 5/7/2024 2327 by Nay Camarena RN  Supportive Measures: active listening utilized  Medication Review/Management: medications reviewed  Taken 5/7/2024 2030 by Nay Camarena RN  Supportive Measures: active listening utilized  Goal: Maintenance of Seizure Control  5/8/2024 0537 by Nay Camarena RN  Outcome: Progressing  5/8/2024 0536 by Nay Camarena RN  Outcome: Progressing  Intervention: Maintain Seizure  Symptom Control  Recent Flowsheet Documentation  Taken 5/7/2024 2327 by Nay Camarena RN  Sensory Stimulation Regulation:   care clustered   lighting decreased   quiet environment promoted  Medication Review/Management: medications reviewed  Taken 5/7/2024 2030 by Nay Camarena RN  Sensory Stimulation Regulation:   care clustered   lighting decreased     Problem: Adult Inpatient Plan of Care  Goal: Plan of Care Review  Description: The Plan of Care Review/Shift note should be completed every shift.  The Outcome Evaluation is a brief statement about your assessment that the patient is improving, declining, or no change.  This information will be displayed automatically on your shift  note.  5/8/2024 0537 by Nay Camarena RN  Outcome: Progressing  Flowsheets (Taken 5/8/2024 0537)  Outcome Evaluation: Pt. Alert to self, drowsy this morning, slight right sided weakness, hx of right facial droop, up with lift, turned/repositioned Q2, Tele: SR with prolong QT, Plan: Prednisone, nebs, Augmentin, chest physiotherapy, possible discharge today.  Plan of Care Reviewed With: patient  Overall Patient Progress: no change  5/8/2024 0536 by Nay Camarena RN  Outcome: Progressing  Goal: Absence of Hospital-Acquired Illness or Injury  Intervention: Identify and Manage Fall Risk  Recent Flowsheet Documentation  Taken 5/7/2024 2327 by Nay Camarena RN  Safety Promotion/Fall Prevention:   activity supervised   clutter free environment maintained   increased rounding and observation   increase visualization of patient   lighting adjusted   nonskid shoes/slippers when out of bed   safety round/check completed  Taken 5/7/2024 2030 by Nay Camarena RN  Safety Promotion/Fall Prevention:   activity supervised   clutter free environment maintained   increased rounding and observation   increase visualization of patient   lighting adjusted   nonskid shoes/slippers when out of bed   safety round/check completed  Intervention:  Prevent Skin Injury  Recent Flowsheet Documentation  Taken 5/7/2024 2327 by Nay Camarena RN  Body Position:   turned   left   legs elevated   heels elevated   weight shifting   side-lying 30 degrees  Skin Protection: incontinence pads utilized  Device Skin Pressure Protection: absorbent pad utilized/changed  Taken 5/7/2024 2030 by Nay Camarena RN  Body Position:   turned   right   legs elevated   weight shifting  Skin Protection: incontinence pads utilized  Device Skin Pressure Protection: absorbent pad utilized/changed  Intervention: Prevent and Manage VTE (Venous Thromboembolism) Risk  Recent Flowsheet Documentation  Taken 5/7/2024 2327 by Nay Camarena RN  VTE Prevention/Management: SCDs (sequential compression devices) off  Taken 5/7/2024 2030 by aNy Camarena RN  VTE Prevention/Management: SCDs (sequential compression devices) off  Intervention: Prevent Infection  Recent Flowsheet Documentation  Taken 5/7/2024 2327 by Nay Camarena RN  Infection Prevention:   hand hygiene promoted   rest/sleep promoted  Taken 5/7/2024 2030 by Nay Camarena RN  Infection Prevention:   equipment surfaces disinfected   rest/sleep promoted   hand hygiene promoted     Problem: Fall Injury Risk  Goal: Absence of Fall and Fall-Related Injury  Intervention: Identify and Manage Contributors  Recent Flowsheet Documentation  Taken 5/7/2024 2327 by Nay Camarena RN  Medication Review/Management: medications reviewed  Intervention: Promote Injury-Free Environment  Recent Flowsheet Documentation  Taken 5/7/2024 2327 by Nay Camarena RN  Safety Promotion/Fall Prevention:   activity supervised   clutter free environment maintained   increased rounding and observation   increase visualization of patient   lighting adjusted   nonskid shoes/slippers when out of bed   safety round/check completed  Taken 5/7/2024 2030 by Nay Camarena RN  Safety Promotion/Fall Prevention:   activity supervised   clutter free environment  maintained   increased rounding and observation   increase visualization of patient   lighting adjusted   nonskid shoes/slippers when out of bed   safety round/check completed     Problem: Comorbidity Management  Goal: Maintenance of COPD Symptom Control  Intervention: Maintain COPD Symptom Control  Recent Flowsheet Documentation  Taken 5/7/2024 2327 by Nay Camarena RN  Supportive Measures: active listening utilized  Medication Review/Management: medications reviewed  Taken 5/7/2024 2030 by Nay Camarena RN  Supportive Measures: active listening utilized  Goal: Maintenance of Seizure Control  Intervention: Maintain Seizure Symptom Control  Recent Flowsheet Documentation  Taken 5/7/2024 2327 by Nay Camarena RN  Sensory Stimulation Regulation:   care clustered   lighting decreased   quiet environment promoted  Medication Review/Management: medications reviewed  Taken 5/7/2024 2030 by Nay Camarena RN  Sensory Stimulation Regulation:   care clustered   lighting decreased

## 2024-05-08 NOTE — PLAN OF CARE
"KENYON orientation. Lethargic/sedated. Reacts to pain, sometimes voice. Does not open eyes. Unable to take meds or eat. NS infusing at 100mL/hr. Bladderscan 123mL. Neurology saw patient at bedside. Bed alarm on.     Goal Outcome Evaluation:      Plan of Care Reviewed With: patient, spouse    Overall Patient Progress: no changeOverall Patient Progress: no change    Outcome Evaluation: Lethargic/sedated. reacts to pain. unable to eat or take meds  Problem: Adult Inpatient Plan of Care  Goal: Plan of Care Review  Description: The Plan of Care Review/Shift note should be completed every shift.  The Outcome Evaluation is a brief statement about your assessment that the patient is improving, declining, or no change.  This information will be displayed automatically on your shift  note.  Outcome: Progressing  Flowsheets (Taken 5/8/2024 1827)  Outcome Evaluation: Lethargic/sedated. reacts to pain. unable to eat or take meds  Plan of Care Reviewed With:   patient   spouse  Overall Patient Progress: no change  Goal: Patient-Specific Goal (Individualized)  Description: You can add care plan individualizations to a care plan. Examples of Individualization might be:  \"Parent requests to be called daily at 9am for status\", \"I have a hard time hearing out of my right ear\", or \"Do not touch me to wake me up as it startles  me\".  Outcome: Progressing  Goal: Absence of Hospital-Acquired Illness or Injury  Outcome: Progressing  Goal: Optimal Comfort and Wellbeing  Outcome: Progressing  Goal: Readiness for Transition of Care  Outcome: Progressing       "

## 2024-05-08 NOTE — PROGRESS NOTES
[facility]    Medicine Progress Note - Hospitalist Service    Date of Admission:  5/5/2024    Assessment & Plan   Olga Bailey is a 78 year old female admitted on 5/5/2024. She has a history of left frontoparietal glioblastoma s/p resection and treated with chemotherapy and radiation with remission in 2021 and subsequent recurrence in 2023, partial seizures and chronic Broca's aphasia secondary to glioblastoma, cognitive and functional impairment due to glioblastoma and chemoradiation, DVT, depression who is presently at a assisted living facility and was brought to the ER due to increased shortness of breath.  She was noted to have increased wheezing and rhonchi.  She has had a cough for a week however no fever.  At baseline she is not on home oxygen and uses a wheelchair.     Possible seizure activity  History of glioblastoma  Partial seziures  -S/p chemotherapy, resection and radiation and now has completed reirradiation.   -had questionable seizure like activity morning of 5/8 but didn't require ativan or intervention  -CT head reassuring  -ex  feels her vinpat may be overly sedating her but I'm hesitant to adjust in setting of questionable seizure activity  -cont home vinpat, keppra at current dosing and will check vinpat level, consult neurology. EEG ordered and pending  -monitor on tele with seizure precautions    Acute hypoxic respiratory failure  Possible aspiration vs reactive airway vs atelectasis or pneumonitis  Hx restrictive lung disease  -With wheezing likely due to reactive airway disease as she has no formal diagnosis of COPD.  -She does have underlying restrictive lung disease.  -CT chest without contrast shows scattered groundglass opacity which could be seen in small airway disease, infection or pulmonary edema is also thickening with scattered areas of mucoid impaction.  -seen by ST and did well with swallow study, tolerating diet  -cont prednisone, plan for  5-7 day course. Cont nebs  -rocephin started 5/5, transition to oral augmentin today with play for 5-7 day course  -RT following, started CPT and mucinex and now off oxygen     History of DVT  -resumed home xarelto      Anemia  -Stable likely anemia of chronic disease.        Diet: Regular Diet Adult    DVT Prophylaxis: DOAC  Martino Catheter: Not present  Lines: None     Cardiac Monitoring: ACTIVE order. Indication: seizures  Code Status: Full Code        Disposition Plan     Medically Ready for Discharge: Anticipated in 2-4 Days         Jayson Sheikh DO  Hospitalist Service  [facility]  Securely message with [employer] (more info)  Text page via [employer] Paging/Directory   ______________________________________________________________________    Interval History   Had concern for seizure activity earlier today and rapid response was called. See RR note but not clearly a seizure and was able to talk to me later in the morning although has been somnolent at times. Her ex  is at bedside and is concerned about her vinpat dosing being too much for her and does NOT feel that what she had was a seizure although I don't believe he witnessed the events    Physical Exam   Vital Signs: Temp: 98.1  F (36.7  C) Temp src: Axillary BP: 119/64 Pulse: 71   Resp: 24 SpO2: 99 % O2 Device: Nasal cannula Oxygen Delivery: 2 LPM  Weight: 182 lbs 12.18 oz  Constitutional: somnolent, occasionally cooperative  Eyes: pupils equal, round and reactive to light and conjunctiva normal  ENT: normocephalic, without obvious abnormality, atraumatic  Respiratory: difficult exam but anteriorly clear but diminished, no wheezing  Cardiovascular: regular rate and rhythm and no murmur noted  GI: normal bowel sounds, soft, non-distended, and non-tender  Skin: no bruising or bleeding  Neurologic: somnolent but awakens and occasionally speaks one word answers, chronic R arm weakness, occasional movement of L arm    45 MINUTES SPENT  BY ME on the date of service doing chart review, history, exam, documentation & further activities per the note.      Data   ------------------------- PAST 24 HR DATA REVIEWED -----------------------------------------------    I have personally reviewed the following data over the past 24 hrs:    7.4  \   10.1 (L)   / 216     N/A N/A N/A /  92   N/A N/A N/A \       Imaging results reviewed over the past 24 hrs:   Recent Results (from the past 24 hour(s))   CT Head w/o Contrast    Narrative    CT SCAN OF THE HEAD WITHOUT CONTRAST   5/8/2024 10:31 AM     HISTORY: hx GBS, possible siezure    TECHNIQUE:  Axial images of the head and coronal reformations without  IV contrast material. Radiation dose for this scan was reduced using  automated exposure control, adjustment of the mA and/or kV according  to patient size, or iterative reconstruction technique.    COMPARISON: MRI 12/18/2023    FINDINGS: Stable calcified meningioma in the posterior right occipital  extra-axial distribution. Left frontotemporal craniotomy changes are  stable. A small resection cavity in the high left posterior lateral  frontal lobe is again demonstrated. No acute intracranial hemorrhage.  No hydrocephalus. There is moderate ventriculomegaly which is stable  from the prior study. Intracranial atheromatous disease findings are  present. No acute loss of gray-white differentiation. Posttherapeutic  and chronic small vessel ischemic changes are noted in a  periventricular distribution predominantly.      Impression    IMPRESSION:   No acute intracranial hemorrhage. Postsurgical changes  are grossly similar to the prior study. Posttherapeutic and chronic  small vessel ischemic changes in a periventricular distribution.      MARTIN MURPHY MD         SYSTEM ID:  [id number]
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
## 2024-05-08 NOTE — CONSULTS
"SPIRITUAL HEALTH SERVICES Consult Note  [facility]. [address] medical    Referral Source/Reason for Visit: Staff request for pt/family support.    Summary and Recommendations -  Provided emotional support for ex-spouse Fahad as he reflected on Olga's illness journey and their relationship.  Fahad affirmed Olga's and his own appreciation for  support and requested follow up while she is hospitalized.  Plan: chaplains will continue to follow.    Natividad Bush MDiv [facility]  Staff   Please place consult order for routine Spiritual Health Services referrals.  [facility] available 24/7 for emergent requests either by having the on-call  paged or by entering an ASAP/STAT consult in Epic (this will also page the on-call ).    Assessment    Saw pt Olga Bailey and x-spouse Fahad at bedside for 15 minutes.     Patient / Family Understanding of Illness and Goals of Care - Olga is sleeping during this visit, Fahad spoke of recent dose of medication that \"knocks her out.\"   Fahad expressed gratitude for the \"excellent and compassionate care\" of staff here, and that Olga has improved since her admission on Sunday. Fahad spoke of scan results that are encouraging as they show \"no change\" and that he has been hoping she can discharge tomorrow, but that this may not happen.    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 \"She has lost all of it. This is hard for her and for us as well.\"    Strengths, Coping, and Resources - Fahad is here daily and spoke of his support and advocacy for Olga at her nursing home.    Meaning, Beliefs, and Spirituality - Fahad spoke of the value of family, human connection, and his desire to \"always be here for her.\"    "

## 2024-05-08 NOTE — PROGRESS NOTES
Care Management Follow Up    Length of Stay (days): 3    Expected Discharge Date: 05/08/2024     Concerns to be Addressed: care coordination/care conferences, discharge planning     Patient plan of care discussed at interdisciplinary rounds: Yes    Anticipated Discharge Disposition: Other (Comments) (TBD)     Anticipated Discharge Services: None  Anticipated Discharge DME:      Patient/family educated on Medicare website which has current facility and service quality ratings:    Education Provided on the Discharge Plan: Yes  Patient/Family in Agreement with the Plan:      Referrals Placed by CM/SW:    Private pay costs discussed: Not applicable    Additional Information:  Provided update to [facility].     [facility] contact (name/number): [phone] (this is the main number but will ring through to the nurse who is present 24/7)  Fax #: [phone]    MACARIO Lima, [facility]  Emergency Room   Please contact the SW on the floor in which the patient is staying for any questions or concerns

## 2024-05-08 NOTE — CODE/RAPID RESPONSE
Rapid response called for suspected seizure activity. Noted by nursing to be non responsive this am with eyes rolled up into her head. Also seemed to have possible tonic movement L arm. Has R hemiparesis but L arm noted to be tensed out in front of her. However when evaluated at bedside able to easily move the L arm but she kept it static to wherever I placed it. Not following commands but occasionally opening eyes. When I try to open her eyes myself she squeezed them tightly shut. . Has upgoing babinskin bilaterally. Vitals stable.    Does have hx of seizures and on vimpat and keppra. Possible seizure but difficult to assess due to her baseline state and seeming refusal to cooperate with exam. Again noted to be tightly squeezing eyes closed during exam and not interacting. Will get EEG and CT head. Cont home seizure meds and monitor with seizure precautions.

## 2024-05-08 NOTE — PLAN OF CARE
"Goal Outcome Evaluation:    Problem: Adult Inpatient Plan of Care  Goal: Plan of Care Review  Description: The Plan of Care Review/Shift note should be completed every shift.  The Outcome Evaluation is a brief statement about your assessment that the patient is improving, declining, or no change.  This information will be displayed automatically on your shift  note.  Outcome: Not Progressing  Goal: Patient-Specific Goal (Individualized)  Description: You can add care plan individualizations to a care plan. Examples of Individualization might be:  \"Parent requests to be called daily at 9am for status\", \"I have a hard time hearing out of my right ear\", or \"Do not touch me to wake me up as it startles  me\".  Outcome: Not Progressing  Goal: Absence of Hospital-Acquired Illness or Injury  Outcome: Not Progressing  Intervention: Identify and Manage Fall Risk  Recent Flowsheet Documentation  Taken 5/8/2024 0802 by Flavia Washburn RN  Safety Promotion/Fall Prevention:   activity supervised   room door open   room near nurse's station   supervised activity   treat underlying cause  Intervention: Prevent Skin Injury  Recent Flowsheet Documentation  Taken 5/8/2024 1208 by Flavia Washburn RN  Body Position:   side-lying   right   turned  Taken 5/8/2024 0839 by Flavia Washburn RN  Body Position: turned  Taken 5/8/2024 0802 by Flavia Washburn RN  Device Skin Pressure Protection: absorbent pad utilized/changed  Goal: Optimal Comfort and Wellbeing  Outcome: Not Progressing  Goal: Readiness for Transition of Care  Outcome: Not Progressing     Pt lethargic, not responding, eyes rolling back in head in AM and RRT was called. EEG, CT and neurology consulted.  Patient responded to sternal rub.  Eventually became more alert, oriented to self only. Responds verbally with short phrases.  Intermittently lethargic and needing some oxygen for sats dipping to mid 80's.  Repositioned Q2H.  Not voiding, straight cath'd at " 1400 for 450 ml.  Regular diet and able to eat/take pills when alert. IVF started at 100 ml/hr.

## 2024-05-09 PROCEDURE — 250N000009 HC RX 250: Performed by: INTERNAL MEDICINE

## 2024-05-09 PROCEDURE — 999N000147 HC STATISTIC PT IP EVAL DEFER: Performed by: PHYSICAL THERAPIST

## 2024-05-09 PROCEDURE — 999N000157 HC STATISTIC RCP TIME EA 10 MIN

## 2024-05-09 PROCEDURE — 94640 AIRWAY INHALATION TREATMENT: CPT | Mod: 76

## 2024-05-09 PROCEDURE — 250N000013 HC RX MED GY IP 250 OP 250 PS 637: Performed by: HOSPITALIST

## 2024-05-09 PROCEDURE — 250N000013 HC RX MED GY IP 250 OP 250 PS 637: Performed by: PSYCHIATRY & NEUROLOGY

## 2024-05-09 PROCEDURE — 120N000001 HC R&B MED SURG/OB

## 2024-05-09 PROCEDURE — 99232 SBSQ HOSP IP/OBS MODERATE 35: CPT | Performed by: INTERNAL MEDICINE

## 2024-05-09 PROCEDURE — 258N000003 HC RX IP 258 OP 636: Performed by: HOSPITALIST

## 2024-05-09 PROCEDURE — 250N000012 HC RX MED GY IP 250 OP 636 PS 637: Performed by: INTERNAL MEDICINE

## 2024-05-09 RX ORDER — LACOSAMIDE 10 MG/ML
75 SOLUTION ORAL AT BEDTIME
Qty: 75 ML | Refills: 6 | Status: SHIPPED | OUTPATIENT
Start: 2024-05-09 | End: 2024-05-10

## 2024-05-09 RX ORDER — LACOSAMIDE 10 MG/ML
25 SOLUTION ORAL DAILY
Qty: 2.5 ML | Refills: 6 | Status: SHIPPED | OUTPATIENT
Start: 2024-05-09 | End: 2024-05-10

## 2024-05-09 RX ORDER — IPRATROPIUM BROMIDE AND ALBUTEROL SULFATE 2.5; .5 MG/3ML; MG/3ML
1 SOLUTION RESPIRATORY (INHALATION) EVERY 6 HOURS PRN
Qty: 90 ML | Refills: 6 | Status: SHIPPED | OUTPATIENT
Start: 2024-05-09

## 2024-05-09 RX ORDER — IPRATROPIUM BROMIDE AND ALBUTEROL SULFATE 2.5; .5 MG/3ML; MG/3ML
1 SOLUTION RESPIRATORY (INHALATION) EVERY 6 HOURS
Qty: 90 ML | Refills: 6 | Status: SHIPPED | OUTPATIENT
Start: 2024-05-09

## 2024-05-09 RX ORDER — PREDNISONE 10 MG/1
30 TABLET ORAL DAILY
Qty: 18 TABLET | Refills: 0 | Status: SHIPPED | OUTPATIENT
Start: 2024-05-10 | End: 2024-09-19

## 2024-05-09 RX ADMIN — SODIUM CHLORIDE: 9 INJECTION, SOLUTION INTRAVENOUS at 06:07

## 2024-05-09 RX ADMIN — PREDNISONE 40 MG: 20 TABLET ORAL at 10:09

## 2024-05-09 RX ADMIN — LACOSAMIDE 25 MG: 50 TABLET, FILM COATED ORAL at 10:09

## 2024-05-09 RX ADMIN — RIVAROXABAN 20 MG: 20 TABLET, FILM COATED ORAL at 18:29

## 2024-05-09 RX ADMIN — GUAIFENESIN 600 MG: 600 TABLET, EXTENDED RELEASE ORAL at 10:08

## 2024-05-09 RX ADMIN — PANTOPRAZOLE SODIUM 40 MG: 40 TABLET, DELAYED RELEASE ORAL at 10:09

## 2024-05-09 RX ADMIN — SODIUM CHLORIDE: 9 INJECTION, SOLUTION INTRAVENOUS at 18:40

## 2024-05-09 RX ADMIN — Medication 75 MG: at 22:26

## 2024-05-09 RX ADMIN — GUAIFENESIN 600 MG: 600 TABLET, EXTENDED RELEASE ORAL at 22:26

## 2024-05-09 RX ADMIN — IPRATROPIUM BROMIDE AND ALBUTEROL SULFATE 3 ML: .5; 3 SOLUTION RESPIRATORY (INHALATION) at 15:44

## 2024-05-09 RX ADMIN — AMOXICILLIN AND CLAVULANATE POTASSIUM 1 TABLET: 875; 125 TABLET, FILM COATED ORAL at 22:25

## 2024-05-09 RX ADMIN — AMLODIPINE BESYLATE 5 MG: 5 TABLET ORAL at 10:09

## 2024-05-09 RX ADMIN — LEVETIRACETAM 750 MG: 250 TABLET, FILM COATED ORAL at 22:27

## 2024-05-09 RX ADMIN — FLUOXETINE HYDROCHLORIDE 20 MG: 20 CAPSULE ORAL at 10:09

## 2024-05-09 RX ADMIN — PANTOPRAZOLE SODIUM 40 MG: 40 TABLET, DELAYED RELEASE ORAL at 18:29

## 2024-05-09 RX ADMIN — IPRATROPIUM BROMIDE AND ALBUTEROL SULFATE 3 ML: .5; 3 SOLUTION RESPIRATORY (INHALATION) at 10:10

## 2024-05-09 RX ADMIN — IPRATROPIUM BROMIDE AND ALBUTEROL SULFATE 3 ML: .5; 3 SOLUTION RESPIRATORY (INHALATION) at 19:27

## 2024-05-09 RX ADMIN — AMOXICILLIN AND CLAVULANATE POTASSIUM 1 TABLET: 875; 125 TABLET, FILM COATED ORAL at 10:09

## 2024-05-09 RX ADMIN — LEVETIRACETAM 750 MG: 250 TABLET, FILM COATED ORAL at 10:09

## 2024-05-09 ASSESSMENT — ACTIVITIES OF DAILY LIVING (ADL)
ADLS_ACUITY_SCORE: 73
ADLS_ACUITY_SCORE: 73
ADLS_ACUITY_SCORE: 71
ADLS_ACUITY_SCORE: 73
ADLS_ACUITY_SCORE: 71
ADLS_ACUITY_SCORE: 71
ADLS_ACUITY_SCORE: 73
ADLS_ACUITY_SCORE: 71
ADLS_ACUITY_SCORE: 73
ADLS_ACUITY_SCORE: 73
ADLS_ACUITY_SCORE: 71
ADLS_ACUITY_SCORE: 73
ADLS_ACUITY_SCORE: 71
ADLS_ACUITY_SCORE: 73

## 2024-05-09 NOTE — PROGRESS NOTES
"[facility] RCAT      Date:5/5/24  Admission Dx:Possible Asp PNA, Fluid overload or COPD  Pulmonary History: COPD  Home Nebulizer/MDI Use:NA  Home Oxygen:NA  Acuity Level (RCAT flow sheet):Level 3  Aerosol Therapy initiated: Continue with Duoneb QID and prn        Pulmonary Hygiene initiated:coughing and deep breathing techniques        Volume Expansion initiated: IS was ordered on 05/5/2024      Current Oxygen Requirements: RA  Current SpO2:95%     Re-evaluation date:5/11/24     Patient Education:Education was performed with the patient in regards to indications/benefits and possible side effects of bronchodilators. Will continue to do education with patient.            See \"RT Assessments\" flow sheet for patient assessment scoring and Acuity Level Details.    Adrianna Mtz, RT on 5/8/2024 at 11:23 PM                         "

## 2024-05-09 NOTE — PROGRESS NOTES
Care Management Discharge Note    Discharge Date: 05/10/2024       Discharge Disposition: Other (Comments) (TBD)    Discharge Services: None    Discharge DME:      Discharge Transportation:  stretcher    Private pay costs discussed: transportation costs    Does the patient's insurance plan have a 3 day qualifying hospital stay waiver?  No    Education Provided on the Discharge Plan: Yes  Persons Notified of Discharge Plans: [facility]  Patient/Family in Agreement with the Plan:      Handoff Referral Completed: No    Additional Information:  Provided update to [facility]. [facility] contact (name/number): [phone] (this is the main number but will ring through to the nurse who is present 24/7)  Fax #: [phone]      Reviewed potential out of pocket cost for [facility] stretcher transport. Current base rate is $1228.70 and $28.67 per mile to the destination. Pt/family expressed understanding and are agreeable to this.      Patient requires stretcher transportation due to total cares, unable to sit in w/c unassisted     Stretcher transportation has been arranged for 5/10/24 from [id number]. Patient and bedside nurse notified of transportation time.    Ambulance PCS form completed and placed in patient chart. Ambulance PCS form should be given to transportation team.     Spoke with patient and ex-spouse. They agree to discharging back to [facility] with stretcher transport. They would want home care with [facility]. Sent referral for PT, OT, Speech.     Family is wondering if it is safe to continue to use the Broda chair at the facility. If not, they would like  to communicate that to DON at facility. Peg CHAN.     Addendum 1551: [facility] does not have any openings until next week.  asked AC to refer out. Waiting on call back from [facility] to confirm return.       MACARIO Lima, [facility]  [facility]   Please contact the SW on the floor in which the patient is staying for any questions or concerns

## 2024-05-09 NOTE — CONSULTS
SPIRITUAL HEALTH SERVICES - Consult Note  MS 3    Referral Source/ Reason for Visit:  manager's triaged request for follow-up for emotional support.    Summary and Recommendations -     Compassionately listened to patient tell stories of life review. (Her speech is slightly delayed. 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  Her children La Nena and Dario live in the [facility].  Her son Sudheer resides in [place].  She states that her wish for her remaining time is to be with her family.    Plan: No additional needs.  [facility] remains available upon request.    Rev. KELLY Merida.  Staff     SHS available 24/7 for emergent requests/ referrals, either by paging the on-call  or by entering an ASAP/STAT consult in [employer], which will also page the on-call .      Assessment    Saw pt Olga Bailey due to triaged request for emotional support.      She says her goals are to live as long as possible with as good a quality of life as possible.      She has no spiritual needs, no unfinished business and no ethical or decision-making questions.      She has had a long journey with cancer and related health issues, which have impacted her quality of life.  Still, her career as a nurse; and her connection to her children and grandchildren influence her decision to continue treatment-oriented care.    Patient/Family Understanding of Illness and Goals of Care - Patient states that she was admitted to  due to breathing problems.    Distress and Loss - She named her cancer progression as a source of loss; leading to changing her relationships with her children and grandchildren; ending her volunteering with the [employer], and her cherished needlework.    Strengths, Coping and Resources - She named her ex- and  "children as her primary sources of support; adding, \"We have a small [place].\"    Meaning, Beliefs and Spirituality - Patient states that she has no Jain beliefs.        "

## 2024-05-09 NOTE — PROGRESS NOTES
[facility]     Hospitalist Progress Note     Assessment & Plan     ASSESSMENT    Olga Bailey is a 78 year old female with hx of left frontoparietal glioblastoma s/p resection and treated with chemotherapy and radiation with remission in 2021 and subsequent recurrence in 2023 (largely wheelchair-bound), partial seizures and chronic Broca's aphasia secondary to glioblastoma, cognitive and functional impairment due to glioblastoma and chemoradiation, and DVT on Xarelto presented with shortness of breath and found to have acute hypoxic respiratory failure 2/2 COPD +/- PNA.    Awaiting results from EEG this morning. Vimpat morning dose decreased and patient much more alert per ex . Will monitor for breakthrough seizures. Anticipate discharge back to [facility] tomorrow.    PLAN     Seizure Disorder w/ Concern for Breakthrough Seizures  History of glioblastoma  -S/p chemotherapy, resection and radiation and now has completed reirradiation.   -Hx of partial seizures, had questionable generalized seizure 5/8  -Ex  feels vimpat may be overly sedating, neurology, consult, morning dose reduced and improved  PLAN  -Vimpat 25mg qam, 75mg qpm (has to be liquid outpatient due to unusual doses, sent these scripts to patient's pharmacy)  -Home Keppra  -Seizure precautions  -Neurology following, EEG pending     Acute hypoxic respiratory failure  Possible aspiration vs reactive airway vs atelectasis or pneumonitis  Hx restrictive lung disease  -Presented with SOB and found to have acute hypoxic respiratory failure 2/2 COPD +/- PNA  -Now weaned off O2  PLAN  -Augmentin x7d course  -Scheduled and PRN nebs  -Prednisone taper ordered for discharge     History of DVT  -Home xarelto      Anemia  -Stable likely anemia of chronic disease    DVT Prophy  -Home Xarelto    Disposition  -Discharge Location: [facility]  -Medically Ready for Discharge: Tomorrow      Jerry Little MD    Subjective     Seen at bedside and  case discussed with . Went for EEG this morning. Patient more alert with changed Vimpat dosing.        Objective   Blood pressure (!) 142/63, pulse 78, temperature 97.8  F (36.6  C), temperature source Oral, resp. rate 16, weight 82.9 kg (182 lb 12.2 oz), SpO2 95%.    PHYSICAL EXAM  General: In no acute distress  CV: RRR.  Lungs: Scattered rhonchi. Nl WOB.  Abd: Non-tender.  Ext: No edema.    LABS AND IMAGING  Reviewed and pertinent results discussed in assessment and plan.

## 2024-05-09 NOTE — PLAN OF CARE
PT: Orders received. Chart reviewed and discussed with care team, CAMRON.  IP PT not indicated due to pt is total cares, Ax2 lift dependent at baseline from [facility]. No IP PT needs identified to return to prior living environment. Defer discharge recommendations to care team.  Will complete orders.

## 2024-05-09 NOTE — PLAN OF CARE
"End of Shift Summary.  For vital signs and complete assessments, please see documentation flowsheets.      Pertinent assessments:  Alet and oriented to self and place. Repositioned with assist of 2 and lift. VSS on RA. Lungs sounds diminished. Infrequent good cough. PW is in placed with good output. Regular diet. Skin is intact. NS infusing 100ml/hr.     Major Shift Events:   Pt. had EEG in this morning.     Plan (Upcoming Events): IVF, Seizure precautions, Neurology following.   Discharge/Transfer Needs: Possible discharge to [facility] tomorrow.      Bedside Shift Report Completed : Y  Bedside Safety Check Completed: Y        Problem: Adult Inpatient Plan of Care  Goal: Plan of Care Review  Description: The Plan of Care Review/Shift note should be completed every shift.  The Outcome Evaluation is a brief statement about your assessment that the patient is improving, declining, or no change.  This information will be displayed automatically on your shift  note.  5/9/2024 1347 by Letitia Raygoza RN  Outcome: Progressing  Flowsheets (Taken 5/9/2024 1347)  Outcome Evaluation: Pt. alert and oreinted. Had breakfast and lunch. Had EEG today.  Plan of Care Reviewed With: patient  Overall Patient Progress: improving  5/9/2024 1339 by Letitia Raygoza RN  Outcome: Progressing  Goal: Patient-Specific Goal (Individualized)  Description: You can add care plan individualizations to a care plan. Examples of Individualization might be:  \"Parent requests to be called daily at 9am for status\", \"I have a hard time hearing out of my right ear\", or \"Do not touch me to wake me up as it startles  me\".  5/9/2024 1347 by Letitia Rayogza RN  Outcome: Progressing  5/9/2024 1339 by Letitia Raygoza RN  Outcome: Progressing  Goal: Absence of Hospital-Acquired Illness or Injury  5/9/2024 1347 by Letitia Raygoza RN  Outcome: Progressing  5/9/2024 1339 by Letitia Raygoza RN  Outcome: " Progressing  Intervention: Identify and Manage Fall Risk  Recent Flowsheet Documentation  Taken 5/9/2024 1000 by Letitia Raygoza RN  Safety Promotion/Fall Prevention:   activity supervised   assistive device/personal items within reach   clutter free environment maintained   room organization consistent   safety round/check completed  Intervention: Prevent Skin Injury  Recent Flowsheet Documentation  Taken 5/9/2024 1300 by Letitia Raygoza RN  Body Position: supine, head elevated  Taken 5/9/2024 1144 by Letitia Raygoza RN  Body Position:   left   turned   side-lying 30 degrees  Taken 5/9/2024 1000 by Letitia Raygoza RN  Body Position: supine, head elevated  Skin Protection: incontinence pads utilized  Device Skin Pressure Protection: absorbent pad utilized/changed  Intervention: Prevent and Manage VTE (Venous Thromboembolism) Risk  Recent Flowsheet Documentation  Taken 5/9/2024 1000 by Letitia Raygoza RN  VTE Prevention/Management: SCDs (sequential compression devices) off  Intervention: Prevent Infection  Recent Flowsheet Documentation  Taken 5/9/2024 1000 by Letitia Raygoza RN  Infection Prevention:   hand hygiene promoted   rest/sleep promoted  Goal: Optimal Comfort and Wellbeing  5/9/2024 1347 by Letitia Raygoza RN  Outcome: Progressing  5/9/2024 1339 by Letitia Raygoza RN  Outcome: Progressing  Intervention: Monitor Pain and Promote Comfort  Recent Flowsheet Documentation  Taken 5/9/2024 1000 by Letitia Raygoza RN  Pain Management Interventions: care clustered  Goal: Readiness for Transition of Care  5/9/2024 1347 by Letitia Raygoza RN  Outcome: Progressing  5/9/2024 1339 by Letitia Raygoza RN  Outcome: Progressing     Problem: Fall Injury Risk  Goal: Absence of Fall and Fall-Related Injury  5/9/2024 1347 by Letitia Raygoza RN  Outcome: Progressing  5/9/2024 1339 by Letitia Raygoza RN  Outcome:  Progressing  Intervention: Identify and Manage Contributors  Recent Flowsheet Documentation  Taken 5/9/2024 1000 by Letitia Raygoza RN  Medication Review/Management: medications reviewed  Intervention: Promote Injury-Free Environment  Recent Flowsheet Documentation  Taken 5/9/2024 1000 by Letitia Raygoza RN  Safety Promotion/Fall Prevention:   activity supervised   assistive device/personal items within reach   clutter free environment maintained   room organization consistent   safety round/check completed     Problem: Oral Intake Inadequate  Goal: Improved Oral Intake  5/9/2024 1347 by Letitia Raygoza RN  Outcome: Progressing  5/9/2024 1339 by Letitia Raygoza RN  Outcome: Progressing     Problem: Comorbidity Management  Goal: Maintenance of COPD Symptom Control  5/9/2024 1347 by Letitia Raygoza RN  Outcome: Progressing  5/9/2024 1339 by Letitia Raygoza RN  Outcome: Progressing  Intervention: Maintain COPD Symptom Control  Recent Flowsheet Documentation  Taken 5/9/2024 1000 by Letitia Raygoza RN  Supportive Measures: active listening utilized  Medication Review/Management: medications reviewed  Goal: Maintenance of Seizure Control  5/9/2024 1347 by Letitia Raygoza RN  Outcome: Progressing  5/9/2024 1339 by Letitia Raygoza RN  Outcome: Progressing  Intervention: Maintain Seizure Symptom Control  Recent Flowsheet Documentation  Taken 5/9/2024 1000 by Letitia Raygoza RN  Sensory Stimulation Regulation:   care clustered   lighting decreased   quiet environment promoted  Medication Review/Management: medications reviewed   Goal Outcome Evaluation:

## 2024-05-09 NOTE — PROGRESS NOTES
Care Management Follow Up    Length of Stay (days): 4    Expected Discharge Date: 05/29/21     Concerns to be Addressed:       Patient plan of care discussed at interdisciplinary rounds: Yes    Anticipated Discharge Disposition:  TCU TBD     Anticipated Discharge Services:  IMM, PAS, Transportation  Anticipated Discharge DME: O2    Patient/family educated on Medicare website which has current facility and service quality ratings:  Yes  Education Provided on the Discharge Plan:  yes  Patient/Family in Agreement with the Plan:  yes    Referrals Placed by CM/SW:  OPAL Burrell on 5/29/21     Additional Information:  1. [facility] -  Admissions ([phone]) contacted SW updating that they are unable to accept patient due to patient being on oral chemo medications. Patient was initially accepted, however admission stated they were unaware that patient was on oral chemo medications and are now no longer able to accept patient at their facility.     2. [facility]: cannot accept patient due to high acuity and chemo needs     2. [facility]: Still awaiting response     3. [facility] in [place]: Still awaiting Response    KRIS Pitts  Weekend Social Work (avail on [employer]):  4A, 4C, 4E, 5A, 5B    pager [phone]  6A, 6B, 6C, 6D     pager [phone]  7A, 7B, 7C, 7D, 5C    pager [phone]  on-call/after hours     pager [phone]       Weekend RN Care Coordinator  pager [phone]  (home d/c with needs incl home care, assisted living facility returns, durable medical equip, IV antibiotics)          No

## 2024-05-10 ENCOUNTER — APPOINTMENT (OUTPATIENT)
Dept: SPEECH THERAPY | Facility: CLINIC | Age: 79
DRG: 177 | End: 2024-05-10
Payer: MEDICARE

## 2024-05-10 VITALS
SYSTOLIC BLOOD PRESSURE: 134 MMHG | TEMPERATURE: 98.1 F | BODY MASS INDEX: 28.62 KG/M2 | HEART RATE: 84 BPM | WEIGHT: 182.76 LBS | OXYGEN SATURATION: 94 % | RESPIRATION RATE: 20 BRPM | DIASTOLIC BLOOD PRESSURE: 74 MMHG

## 2024-05-10 LAB
BACTERIA BLD CULT: NO GROWTH
BACTERIA BLD CULT: NO GROWTH

## 2024-05-10 PROCEDURE — 250N000009 HC RX 250: Performed by: INTERNAL MEDICINE

## 2024-05-10 PROCEDURE — 250N000013 HC RX MED GY IP 250 OP 250 PS 637: Performed by: HOSPITALIST

## 2024-05-10 PROCEDURE — 94640 AIRWAY INHALATION TREATMENT: CPT

## 2024-05-10 PROCEDURE — 250N000013 HC RX MED GY IP 250 OP 250 PS 637: Performed by: PSYCHIATRY & NEUROLOGY

## 2024-05-10 PROCEDURE — 99238 HOSP IP/OBS DSCHRG MGMT 30/<: CPT | Performed by: STUDENT IN AN ORGANIZED HEALTH CARE EDUCATION/TRAINING PROGRAM

## 2024-05-10 PROCEDURE — 999N000157 HC STATISTIC RCP TIME EA 10 MIN

## 2024-05-10 PROCEDURE — 258N000003 HC RX IP 258 OP 636: Performed by: HOSPITALIST

## 2024-05-10 PROCEDURE — 250N000012 HC RX MED GY IP 250 OP 636 PS 637: Performed by: INTERNAL MEDICINE

## 2024-05-10 PROCEDURE — 92526 ORAL FUNCTION THERAPY: CPT | Mod: GN

## 2024-05-10 PROCEDURE — 94640 AIRWAY INHALATION TREATMENT: CPT | Mod: 76

## 2024-05-10 RX ORDER — LACOSAMIDE 150 MG/1
75 TABLET ORAL AT BEDTIME
Qty: 15 TABLET | Refills: 5 | Status: SHIPPED | OUTPATIENT
Start: 2024-05-10 | End: 2024-06-01

## 2024-05-10 RX ADMIN — PREDNISONE 40 MG: 20 TABLET ORAL at 09:23

## 2024-05-10 RX ADMIN — PANTOPRAZOLE SODIUM 40 MG: 40 TABLET, DELAYED RELEASE ORAL at 09:23

## 2024-05-10 RX ADMIN — FLUOXETINE HYDROCHLORIDE 20 MG: 20 CAPSULE ORAL at 09:23

## 2024-05-10 RX ADMIN — IPRATROPIUM BROMIDE AND ALBUTEROL SULFATE 3 ML: .5; 3 SOLUTION RESPIRATORY (INHALATION) at 08:27

## 2024-05-10 RX ADMIN — SODIUM CHLORIDE: 9 INJECTION, SOLUTION INTRAVENOUS at 03:46

## 2024-05-10 RX ADMIN — GUAIFENESIN 600 MG: 600 TABLET, EXTENDED RELEASE ORAL at 09:23

## 2024-05-10 RX ADMIN — AMOXICILLIN AND CLAVULANATE POTASSIUM 1 TABLET: 875; 125 TABLET, FILM COATED ORAL at 09:23

## 2024-05-10 RX ADMIN — LACOSAMIDE 25 MG: 50 TABLET, FILM COATED ORAL at 09:23

## 2024-05-10 RX ADMIN — IPRATROPIUM BROMIDE AND ALBUTEROL SULFATE 3 ML: .5; 3 SOLUTION RESPIRATORY (INHALATION) at 12:05

## 2024-05-10 RX ADMIN — AMLODIPINE BESYLATE 5 MG: 5 TABLET ORAL at 09:23

## 2024-05-10 RX ADMIN — LEVETIRACETAM 750 MG: 250 TABLET, FILM COATED ORAL at 09:23

## 2024-05-10 ASSESSMENT — ACTIVITIES OF DAILY LIVING (ADL)
ADLS_ACUITY_SCORE: 71
ADLS_ACUITY_SCORE: 75
ADLS_ACUITY_SCORE: 75
ADLS_ACUITY_SCORE: 71
ADLS_ACUITY_SCORE: 75
ADLS_ACUITY_SCORE: 71
ADLS_ACUITY_SCORE: 75
ADLS_ACUITY_SCORE: 71
ADLS_ACUITY_SCORE: 75

## 2024-05-10 NOTE — DISCHARGE SUMMARY
[facility]  Hospitalist Discharge Summary      Date of Admission:  5/5/2024  Date of Discharge:  5/10/2024  Discharging Provider: Silverio Sanches MD  Discharge Service: Hospitalist Service    Discharge Diagnoses   Seizure disorder  History of glioblastoma  Aspiration versus reactive airway versus atelectasis versus pneumonitis  History of restrictive lung disease  History of DVT  Anemia    Clinically Significant Risk Factors          Follow-ups Needed After Discharge   Follow-up Appointments     Follow-up and recommended labs and tests       PCP and neurology in 1-2 weeks            Unresulted Labs Ordered in the Past 30 Days of this Admission       Date and Time Order Name Status Description    5/8/2024 10:00 AM Lacosamide Level In process           Discharge Disposition   Discharged to assisted living  Condition at discharge: Stable    Hospital Course   Olga Bailey is a 78 year old female with hx of left frontoparietal glioblastoma s/p resection and treated with chemotherapy and radiation with remission in 2021 and subsequent recurrence in 2023 (largely wheelchair-bound), partial seizures and chronic Broca's aphasia secondary to glioblastoma, cognitive and functional impairment due to glioblastoma and chemoradiation, and DVT on Xarelto presented with shortness of breath and found to have acute hypoxic respiratory failure 2/2 COPD +/- PNA.  Initially requiring supplemental oxygen, started on antibiotics, subsequently weaned off of O2.  Discharging with oral antibiotics to complete 7-day course of treatment.  Also being discharged on steroid taper.  Noted to have possible seizure activity while in the hospital.  Neurology was consulted, discussed antiepileptic dosing with patient and , ultimately switched Vimpat dosing to 25 mg in the morning, 75 mg in the evening so she is less somnolent during the day.  Will need to follow-up with neurology as an outpatient.    Seizure Disorder w/  Concern for Breakthrough Seizures  History of glioblastoma  -S/p chemotherapy, resection and radiation and now has completed reirradiation.   -Hx of partial seizures, had questionable generalized seizure 5/8  -Ex  feels vimpat may be overly sedating, neurology, consult, morning dose reduced and improved mentation today  -Vimpat 25mg qam, 75mg qpm (has to be liquid outpatient due to unusual doses, sent these scripts to patient's pharmacy)  -Home Keppra resumed  -Follow-up with neurology as an outpatient     Acute hypoxic respiratory failure  Possible aspiration vs reactive airway vs atelectasis or pneumonitis  Hx restrictive lung disease  -Presented with SOB and found to have acute hypoxic respiratory failure 2/2 COPD +/- PNA  -Now weaned off O2  -Augmentin x7d course ordered for discharge  -Prednisone taper ordered for discharge     History of DVT  -Home xarelto resumed      Anemia  -Stable likely anemia of chronic disease     DVT Prophy  -Home Xarelto    Consultations This Hospital Stay   SPEECH LANGUAGE PATH ADULT IP CONSULT  CARE MANAGEMENT / SOCIAL WORK IP CONSULT  NEUROLOGY IP CONSULT  SPIRITUAL HEALTH SERVICES IP CONSULT  SPIRITUAL HEALTH SERVICES IP CONSULT  PHYSICAL THERAPY ADULT IP CONSULT    Code Status   Full Code    Time Spent on this Encounter   I, Silverio Sanches MD, personally saw the patient today and spent less than or equal to 30 minutes discharging this patient.       Silverio Sanches MD  [place] [address] [facility]  [address] [place] [address]  Phone: [phone]  Fax: [phone]  ______________________________________________________________________    Physical Exam   Vital Signs: Temp: 98.4  F (36.9  C) Temp src: Axillary BP: (!) 159/73 Pulse: 67   Resp: 18 SpO2: 95 % O2 Device: None (Room air)    Weight: 182 lbs 12.18 oz  Constitutional: No acute distress, appears comfortable  Respiratory: Normal work of breathing, lungs clear to  auscultation  Cardiovascular: RRR, no MRG's  GI: Abdomen soft, nontender, nondistended  Neurologic: Alert, engaged at time of my exam       Primary Care Physician   [facility]    Discharge Orders      Reason for your hospital stay    You were hospitalized for respiratory difficulty and found to have COPD exacerbation and pneumonia and were treated for these conditions. You also were sedated from your anti-seizure regimen and your dose of your Vimpat was adjusted. You will discharge back to your care facility with PCP and neurology follow-up.     Follow-up and recommended labs and tests     PCP and neurology in 1-2 weeks     Activity    Your activity upon discharge: activity as tolerated. NO USE OF BRODA WHEELCHAIRS     Diet    Follow this diet upon discharge: Orders Placed This Encounter      Regular Diet Adult       Significant Results and Procedures   Most Recent 3 CBC's:  Recent Labs   Lab Test 05/08/24  0649 05/06/24  0640 05/05/24 0139   WBC 7.4 7.0 5.8   HGB 10.1* 9.3* 9.9*   MCV 85 87 87    217 217     Most Recent 3 BMP's:  Recent Labs   Lab Test 05/08/24  0914 05/06/24  0640 05/05/24 0139 12/07/23  1430   NA  --  145 141 140   POTASSIUM  --  4.1 3.9 3.6   CHLORIDE  --  110* 104 102   CO2  --  25 28 25   BUN  --  16.7 15.9 20.3   CR  --  0.69 0.89 0.60   ANIONGAP  --  10 9 13   ESTIVEN  --  9.3 9.2 9.6   GLC 92 102* 108* 74     Most Recent 2 LFT's:  Recent Labs   Lab Test 05/05/24  0139 11/15/23  0847   AST 16 15   ALT 10 17   ALKPHOS 83 60   BILITOTAL 0.2 0.2       Discharge Medications   Current Discharge Medication List        START taking these medications    Details   amoxicillin-clavulanate (AUGMENTIN) 875-125 MG tablet Take 1 tablet by mouth every 12 hours for 3 days  Qty: 6 tablet, Refills: 0    Associated Diagnoses: COPD exacerbation (H)      !! ipratropium - albuterol 0.5 mg/2.5 mg/3 mL (DUONEB) 0.5-2.5 (3) MG/3ML neb solution Take 1 vial (3 mLs) by nebulization every 6 hours as  needed for shortness of breath, wheezing or cough  Qty: 90 mL, Refills: 6    Associated Diagnoses: COPD exacerbation (H)      !! ipratropium - albuterol 0.5 mg/2.5 mg/3 mL (DUONEB) 0.5-2.5 (3) MG/3ML neb solution Take 1 vial (3 mLs) by nebulization every 6 hours  Qty: 90 mL, Refills: 6    Associated Diagnoses: COPD exacerbation (H)      !! lacosamide (VIMPAT) 10 MG/ML SOLN solution Take 2.5 mLs (25 mg) by mouth daily  Qty: 2.5 mL, Refills: 6    Associated Diagnoses: Recurrent seizures (H)      !! lacosamide (VIMPAT) 10 MG/ML SOLN solution Take 7.5 mLs (75 mg) by mouth at bedtime  Qty: 75 mL, Refills: 6    Associated Diagnoses: Recurrent seizures (H)      predniSONE (DELTASONE) 10 MG tablet Take 3 tablets (30 mg) by mouth daily  Qty: 18 tablet, Refills: 0    Comments: Take 3 tabs (30mg) x3d, then 2 tabs (20mg) x3d, then 1 tab (10mg) x3d, then stop  Associated Diagnoses: COPD exacerbation (H)       !! - Potential duplicate medications found. Please discuss with provider.        CONTINUE these medications which have NOT CHANGED    Details   acetaminophen (TYLENOL) 500 MG tablet Take 500-1,000 mg by mouth every 4 hours as needed for mild pain      amLODIPine (NORVASC) 5 MG tablet Take 1 tablet (5 mg) by mouth daily  Qty:      Associated Diagnoses: Benign essential hypertension      FLUoxetine (PROZAC) 20 MG capsule Take 20 mg by mouth daily      levETIRAcetam (KEPPRA) 750 MG tablet Take 750 mg by mouth 2 times daily      loperamide (IMODIUM) 2 MG capsule Take 4 mg by mouth as needed for diarrhea Then 1 capsule after consecutive stools.      LORazepam (ATIVAN) 0.5 MG tablet Take 0.5 mg by mouth 2 times daily as needed for seizures More than 10 minutes between each doses      multivitamin, therapeutic (THERA-VIT) TABS tablet Take 1 tablet by mouth daily    Associated Diagnoses: Severe malnutrition (H24)      ondansetron (ZOFRAN) 4 MG tablet Take 1 tablet (4 mg) by mouth every 8 hours as needed for nausea  Qty: 30 tablet,  Refills: 3    Associated Diagnoses: Chemotherapy-induced nausea and vomiting      pantoprazole (PROTONIX) 40 MG EC tablet Take 1 tablet (40 mg) by mouth 2 times daily (before meals)  Qty: 30 tablet, Refills: 0    Associated Diagnoses: Gastroesophageal reflux disease without esophagitis      polyethylene glycol (MIRALAX) 17 GM/Dose powder Take 17 g by mouth 2 times daily as needed for constipation  Qty: 510 g, Refills: 0    Associated Diagnoses: Constipation, unspecified constipation type      pramox-pe-glycerin-petrolatum (PREPARATION H) 1-0.25-14.4-15 % CREA cream Place rectally 3 times daily as needed for hemorrhoids  Qty: 51 g, Refills: 0    Associated Diagnoses: External hemorrhoids      rivaroxaban ANTICOAGULANT (XARELTO ANTICOAGULANT) 20 MG TABS tablet Take 1 tablet (20 mg) by mouth daily (with dinner)  Qty: 30 tablet, Refills: 3    Associated Diagnoses: GBM (glioblastoma multiforme) (H)      senna-docusate (SENOKOT-S/PERICOLACE) 8.6-50 MG tablet Take 1 tablet by mouth daily as needed for constipation      sodium chloride (OCEAN) 0.65 % nasal spray Spray 1 spray into both nostrils every hour as needed for congestion  Qty: 104 mL, Refills: 0    Associated Diagnoses: Rhinorrhea           STOP taking these medications       albuterol (PROVENTIL) (2.5 MG/3ML) 0.083% neb solution Comments:   Reason for Stopping:         albuterol (PROVENTIL) (2.5 MG/3ML) 0.083% neb solution Comments:   Reason for Stopping:         lacosamide (VIMPAT) 50 MG TABS tablet Comments:   Reason for Stopping:             Allergies   Allergies   Allergen Reactions    Pilocarpine Headache and Nausea and Vomiting    Chlorhexidine Itching and Rash    Aripiprazole Headache and Other (See Comments)     Insomnia, nightmares    Bacitracin Rash     Bactroban is effective; No difficulties    Erythromycin      intolerant.    Meperidine Hcl      intolerant only   Demerol    Chlorhexidine Gluconate Rash

## 2024-05-10 NOTE — PROGRESS NOTES
Care Management Discharge Note    Discharge Date: 05/10/2024       Discharge Disposition: Other (Comments) (TBD)    Discharge Services: None    Discharge DME:  none    Discharge Transportation:  stretcher    Private pay costs discussed: transportation costs    Does the patient's insurance plan have a 3 day qualifying hospital stay waiver?  No      Education Provided on the Discharge Plan: Yes  Persons Notified of Discharge Plans: Patient, spouse  Patient/Family in Agreement with the Plan:  yes    Handoff Referral Completed: Yes    Additional Information:  Patient discharging back to her [facility] today with homecare PT, OT, ST through [facility]. Transport time set up for [id number]. Updated patient and care team. Notified Justin Mendoza of discharge and transport time. Faxed discharge orders. Faxed discharge orders to [facility] and updated liaison that patient is discharging today.     Christina Anton RN  Care Coordinator  [facility]

## 2024-05-10 NOTE — PLAN OF CARE
"Goal Outcome Evaluation:      Plan of Care Reviewed With: patient, spouse    Overall Patient Progress: improvingOverall Patient Progress: improving    Outcome Evaluation: Pt stated she is feeling better and is medically stable for discharge    Pt alert and orientated, pt is wheelchair bound baseline. Assist of 2 with lift. Ns at 100 ml/hr. Tele SR 81. Pt's extremities are stiff.  Pt voiding via [place]. Has difficulty word finding, or may be a bit slow to respond.  Bed bath given, hair washed, teeth brushed, up in chair. Waiting for transportation to discharge back to assisted living.     Problem: Adult Inpatient Plan of Care  Goal: Plan of Care Review  Description: The Plan of Care Review/Shift note should be completed every shift.  The Outcome Evaluation is a brief statement about your assessment that the patient is improving, declining, or no change.  This information will be displayed automatically on your shift  note.  Outcome: Progressing  Flowsheets (Taken 5/10/2024 1342)  Outcome Evaluation: Pt stated she is feeling better and is medically stable for discharge  Plan of Care Reviewed With:   patient   spouse  Overall Patient Progress: improving  Goal: Patient-Specific Goal (Individualized)  Description: You can add care plan individualizations to a care plan. Examples of Individualization might be:  \"Parent requests to be called daily at 9am for status\", \"I have a hard time hearing out of my right ear\", or \"Do not touch me to wake me up as it startles  me\".  Outcome: Progressing  Goal: Absence of Hospital-Acquired Illness or Injury  Outcome: Progressing  Intervention: Prevent and Manage VTE (Venous Thromboembolism) Risk  Recent Flowsheet Documentation  Taken 5/10/2024 0835 by Harish Meléndez, RN  VTE Prevention/Management: SCDs (sequential compression devices) off  Intervention: Prevent Infection  Recent Flowsheet Documentation  Taken 5/10/2024 0835 by Harish Meléndez, RN  Infection Prevention: hand hygiene " promoted  Goal: Optimal Comfort and Wellbeing  Outcome: Progressing  Goal: Readiness for Transition of Care  Outcome: Progressing     Problem: Fall Injury Risk  Goal: Absence of Fall and Fall-Related Injury  Outcome: Progressing     Problem: Oral Intake Inadequate  Goal: Improved Oral Intake  Outcome: Progressing     Problem: Comorbidity Management  Goal: Maintenance of COPD Symptom Control  Outcome: Progressing  Goal: Maintenance of Seizure Control  Outcome: Progressing

## 2024-05-10 NOTE — PROGRESS NOTES
Speech Language Therapy Discharge Summary    Reason for therapy discharge:    Discharged to home with home therapy.    Progress towards therapy goal(s). See goals on Care Plan in [facility] electronic health record for goal details.  Goals partially met.  Barriers to achieving goals:   discharge from facility.    Therapy recommendation(s):    Recommend a Regular diet with thin liquids with self selection of soft foods. Safe swallow strategies include upright for all po when alert, slow pace, small single bites/sips. At this time needs 1:1 assist with meals to ensure use of safe swallow strategies.  Rec HH SLP as needed to implement safe swallow strategy use.

## 2024-05-10 NOTE — PLAN OF CARE
"  Problem: Adult Inpatient Plan of Care  Goal: Plan of Care Review  Description: The Plan of Care Review/Shift note should be completed every shift.  The Outcome Evaluation is a brief statement about your assessment that the patient is improving, declining, or no change.  This information will be displayed automatically on your shift  note.  Outcome: Progressing  Flowsheets (Taken 5/9/2024 2014)  Outcome Evaluation: pt. is A&O, ATE 50% of her meal, on IVNS 100 ml/hr continuing fluids, voided 400 ml.  Plan of Care Reviewed With: patient  Goal: Patient-Specific Goal (Individualized)  Description: You can add care plan individualizations to a care plan. Examples of Individualization might be:  \"Parent requests to be called daily at 9am for status\", \"I have a hard time hearing out of my right ear\", or \"Do not touch me to wake me up as it startles  me\".  Outcome: Progressing  Goal: Absence of Hospital-Acquired Illness or Injury  Outcome: Progressing  Intervention: Prevent Skin Injury  Recent Flowsheet Documentation  Taken 5/9/2024 1808 by Mary Ann Gomez RN  Body Position:   right   turned  Skin Protection: incontinence pads utilized  Device Skin Pressure Protection: absorbent pad utilized/changed  Taken 5/9/2024 1800 by Mary Ann Gomez RN  Body Position:   left   turned  Intervention: Prevent and Manage VTE (Venous Thromboembolism) Risk  Recent Flowsheet Documentation  Taken 5/9/2024 1808 by Mary Ann Gomez RN  VTE Prevention/Management: SCDs (sequential compression devices) off  Intervention: Prevent Infection  Recent Flowsheet Documentation  Taken 5/9/2024 1808 by Mary Ann Gomez RN  Infection Prevention:   hand hygiene promoted   rest/sleep promoted  Goal: Optimal Comfort and Wellbeing  Outcome: Progressing  Intervention: Monitor Pain and Promote Comfort  Recent Flowsheet Documentation  Taken 5/9/2024 1800 by Mary Ann Gomez RN  Pain Management Interventions: care clustered  Goal: Readiness for " Transition of Care  Outcome: Progressing     Problem: Fall Injury Risk  Goal: Absence of Fall and Fall-Related Injury  Outcome: Progressing  Intervention: Identify and Manage Contributors  Recent Flowsheet Documentation  Taken 5/9/2024 1808 by Mary Ann Gomez RN  Medication Review/Management: medications reviewed     Problem: Oral Intake Inadequate  Goal: Improved Oral Intake  Outcome: Progressing     Problem: Comorbidity Management  Goal: Maintenance of COPD Symptom Control  Outcome: Progressing  Intervention: Maintain COPD Symptom Control  Recent Flowsheet Documentation  Taken 5/9/2024 1808 by Mary Ann Gomez RN  Supportive Measures: active listening utilized  Medication Review/Management: medications reviewed  Goal: Maintenance of Seizure Control  Outcome: Progressing  Intervention: Maintain Seizure Symptom Control  Recent Flowsheet Documentation  Taken 5/9/2024 1808 by Mary Ann Gomez RN  Sensory Stimulation Regulation:   care clustered   lighting decreased   quiet environment promoted  Seizure Precautions:   activity supervised   side rails padded  Medication Review/Management: medications reviewed   Goal Outcome Evaluation:      Plan of Care Reviewed With: patient          Outcome Evaluation: pt. is A&O, ATE 50% of her meal, on IVNS 100 ml/hr continuing fluids, voided 400 ml.

## 2024-05-10 NOTE — PLAN OF CARE
"VS on 2L nc. Alert and communicating clearly. Has a hard time finding words when tired. Stated that she feels \"much better since the medication changed\", Voided large amount. Total care. Turned and repositioned. Took pills whole in apple sauce. Planing to return to [facility] with home care 5/10.    Goal Outcome Evaluation:      Plan of Care Reviewed With: patient    Overall Patient Progress: improvingOverall Patient Progress: improving    Outcome Evaluation: Pt is alert and back to her baseline mentation. Eating more. Voiding.      Problem: Adult Inpatient Plan of Care  Goal: Plan of Care Review  Description: The Plan of Care Review/Shift note should be completed every shift.  The Outcome Evaluation is a brief statement about your assessment that the patient is improving, declining, or no change.  This information will be displayed automatically on your shift  note.  Outcome: Progressing  Flowsheets (Taken 5/10/2024 0006)  Outcome Evaluation: Pt is alert and back to her baseline mentation. Eating more. Voiding.  Plan of Care Reviewed With: patient  Overall Patient Progress: improving  Goal: Patient-Specific Goal (Individualized)  Description: You can add care plan individualizations to a care plan. Examples of Individualization might be:  \"Parent requests to be called daily at 9am for status\", \"I have a hard time hearing out of my right ear\", or \"Do not touch me to wake me up as it startles  me\".  Outcome: Progressing  Goal: Absence of Hospital-Acquired Illness or Injury  Outcome: Progressing  Intervention: Identify and Manage Fall Risk  Recent Flowsheet Documentation  Taken 5/9/2024 2158 by Maribell Streeter, RN  Safety Promotion/Fall Prevention: increased rounding and observation  Intervention: Prevent Skin Injury  Recent Flowsheet Documentation  Taken 5/9/2024 2158 by Maribell Streeter, RN  Body Position:   left   turned  Goal: Optimal Comfort and Wellbeing  Outcome: Progressing  Goal: Readiness for " Transition of Care  Outcome: Progressing     Problem: Fall Injury Risk  Goal: Absence of Fall and Fall-Related Injury  Outcome: Progressing  Intervention: Promote Injury-Free Environment  Recent Flowsheet Documentation  Taken 5/9/2024 2158 by Maribell Streeter RN  Safety Promotion/Fall Prevention: increased rounding and observation     Problem: Oral Intake Inadequate  Goal: Improved Oral Intake  Outcome: Progressing     Problem: Comorbidity Management  Goal: Maintenance of COPD Symptom Control  Outcome: Progressing  Goal: Maintenance of Seizure Control  Outcome: Progressing

## 2024-05-10 NOTE — PLAN OF CARE
"Pt is alert and oriented to self. Pt denies any pain or shortness of breath and on room air.    External catheter draining and care completed. Repositioning Q2hrs maintained.     Tele: SR with 1st degree AVB  IV NS infusing at 100 ml/hr.     Seizure precautions maintained. Ongoing monitoring.    Plan: Discharge to [facility] today between 1237hrs-1322hrs via stretcher service.    BP (!) 149/72 (BP Location: Left arm)   Pulse 85   Temp 98.9  F (37.2  C) (Axillary)   Resp 18   Wt 82.9 kg (182 lb 12.2 oz)   SpO2 96%   BMI 28.62 kg/m         Goal Outcome Evaluation:      Plan of Care Reviewed With: patient    Overall Patient Progress: improvingOverall Patient Progress: improving    Outcome Evaluation: Pt is alert and oriented to self. Continue POC.      Problem: Adult Inpatient Plan of Care  Goal: Plan of Care Review  Description: The Plan of Care Review/Shift note should be completed every shift.  The Outcome Evaluation is a brief statement about your assessment that the patient is improving, declining, or no change.  This information will be displayed automatically on your shift  note.  Outcome: Progressing  Flowsheets (Taken 5/10/2024 0336)  Outcome Evaluation: Pt is alert and oriented to self. Continue POC.  Plan of Care Reviewed With: patient  Overall Patient Progress: improving  Goal: Patient-Specific Goal (Individualized)  Description: You can add care plan individualizations to a care plan. Examples of Individualization might be:  \"Parent requests to be called daily at 9am for status\", \"I have a hard time hearing out of my right ear\", or \"Do not touch me to wake me up as it startles  me\".  Outcome: Progressing  Goal: Absence of Hospital-Acquired Illness or Injury  Outcome: Progressing  Intervention: Identify and Manage Fall Risk  Recent Flowsheet Documentation  Taken 5/10/2024 0300 by Foreign Sarmiento, RN  Safety Promotion/Fall Prevention: safety round/check completed  Taken 5/10/2024 0200 by Foreign Sarmiento, " RN  Safety Promotion/Fall Prevention: safety round/check completed  Taken 5/10/2024 0116 by Foreign Sarmiento RN  Safety Promotion/Fall Prevention:   safety round/check completed   lighting adjusted   clutter free environment maintained   assistive device/personal items within reach   activity supervised  Taken 5/10/2024 0100 by Foreign Sarmiento RN  Safety Promotion/Fall Prevention: safety round/check completed  Taken 5/10/2024 0000 by Foreign Sarmiento RN  Safety Promotion/Fall Prevention: safety round/check completed  Taken 5/9/2024 2300 by Foreign Sarmiento RN  Safety Promotion/Fall Prevention: safety round/check completed  Intervention: Prevent Skin Injury  Recent Flowsheet Documentation  Taken 5/10/2024 0315 by Foreign Sarmiento RN  Body Position:   left   turned   heels elevated  Taken 5/10/2024 0116 by Foreign Sarmiento RN  Body Position:   right   turned   heels elevated   upper extremity elevated  Skin Protection: incontinence pads utilized  Device Skin Pressure Protection:   absorbent pad utilized/changed   pressure points protected  Taken 5/9/2024 2357 by Foreign Sarmiento RN  Body Position:   heels elevated   upper extremity elevated   left   right   turned  Intervention: Prevent and Manage VTE (Venous Thromboembolism) Risk  Recent Flowsheet Documentation  Taken 5/10/2024 0116 by Foreign Sarmiento RN  VTE Prevention/Management: SCDs (sequential compression devices) off  Intervention: Prevent Infection  Recent Flowsheet Documentation  Taken 5/10/2024 0116 by Foreign Sarmiento RN  Infection Prevention:   single patient room provided   rest/sleep promoted   hand hygiene promoted  Goal: Optimal Comfort and Wellbeing  Outcome: Progressing  Goal: Readiness for Transition of Care  Outcome: Progressing     Problem: Fall Injury Risk  Goal: Absence of Fall and Fall-Related Injury  Outcome: Progressing  Intervention: Identify and Manage Contributors  Recent Flowsheet Documentation  Taken 5/10/2024 0116 by Torsten  Olubukola N, RN  Medication Review/Management: medications reviewed  Intervention: Promote Injury-Free Environment  Recent Flowsheet Documentation  Taken 5/10/2024 0300 by Foreign Sarmiento RN  Safety Promotion/Fall Prevention: safety round/check completed  Taken 5/10/2024 0200 by Foreign Sarmiento RN  Safety Promotion/Fall Prevention: safety round/check completed  Taken 5/10/2024 0116 by Foreign Sarmiento RN  Safety Promotion/Fall Prevention:   safety round/check completed   lighting adjusted   clutter free environment maintained   assistive device/personal items within reach   activity supervised  Taken 5/10/2024 0100 by Foreign Sarmiento RN  Safety Promotion/Fall Prevention: safety round/check completed  Taken 5/10/2024 0000 by Foreign Sarmiento RN  Safety Promotion/Fall Prevention: safety round/check completed  Taken 5/9/2024 2300 by Foreign Sarmiento RN  Safety Promotion/Fall Prevention: safety round/check completed     Problem: Oral Intake Inadequate  Goal: Improved Oral Intake  Outcome: Progressing     Problem: Comorbidity Management  Goal: Maintenance of COPD Symptom Control  Outcome: Progressing  Intervention: Maintain COPD Symptom Control  Recent Flowsheet Documentation  Taken 5/10/2024 0116 by Foreign Sarmiento RN  Medication Review/Management: medications reviewed  Goal: Maintenance of Seizure Control  Outcome: Progressing  Intervention: Maintain Seizure Symptom Control  Recent Flowsheet Documentation  Taken 5/10/2024 0116 by Foreign Sarmiento RN  Medication Review/Management: medications reviewed

## 2024-05-11 LAB — LACOSAMIDE SERPL-MCNC: 4.3 UG/ML

## 2024-05-11 NOTE — PROGRESS NOTES
Care Management Follow Up    Length of Stay (days): 5    Expected Discharge Date: 05/10/2024     Concerns to be Addressed: care coordination/care conferences, discharge planning         Additional Information:  Received call from Justin Mendoza with questions regarding a medication. They were told by spouse that patient was to have mucinex and it was quite important. Did not see anywhere that patient was on mucinex or needed it. Instructed to follow up with primary. Also asked about an order for patient to not sit in the Broda chair. This writer asked if patient has an order currently to sit in the chair. He stated no they just got the chair for her to sit in to be more comfortable than a straight back wheelchair. Again instructed to address that with her primary that comes to the facility.     Christina Anton RN  Care Coordinator  [facility]

## 2024-05-22 DIAGNOSIS — C71.9 GLIOBLASTOMA (H): Primary | ICD-10-CM

## 2024-06-01 ENCOUNTER — APPOINTMENT (OUTPATIENT)
Dept: GENERAL RADIOLOGY | Facility: CLINIC | Age: 79
End: 2024-06-01
Attending: EMERGENCY MEDICINE
Payer: MEDICARE

## 2024-06-01 ENCOUNTER — HOSPITAL ENCOUNTER (EMERGENCY)
Facility: CLINIC | Age: 79
Discharge: HOME | End: 2024-06-01
Attending: EMERGENCY MEDICINE | Admitting: EMERGENCY MEDICINE
Payer: MEDICARE

## 2024-06-01 VITALS
BODY MASS INDEX: 26.39 KG/M2 | WEIGHT: 168.5 LBS | SYSTOLIC BLOOD PRESSURE: 135 MMHG | RESPIRATION RATE: 22 BRPM | TEMPERATURE: 98.1 F | DIASTOLIC BLOOD PRESSURE: 98 MMHG | OXYGEN SATURATION: 91 % | HEART RATE: 86 BPM

## 2024-06-01 DIAGNOSIS — G40.909 RECURRENT SEIZURES (H): ICD-10-CM

## 2024-06-01 DIAGNOSIS — T50.905A SEDATED DUE TO MEDICATION: ICD-10-CM

## 2024-06-01 DIAGNOSIS — R40.4 SEDATED DUE TO MEDICATION: ICD-10-CM

## 2024-06-01 DIAGNOSIS — R53.1 GENERALIZED WEAKNESS: ICD-10-CM

## 2024-06-01 DIAGNOSIS — G40.909 SEIZURE DISORDER (H): ICD-10-CM

## 2024-06-01 LAB
ALBUMIN SERPL BCG-MCNC: 3.8 G/DL (ref 3.5–5.2)
ALBUMIN UR-MCNC: 20 MG/DL
ALP SERPL-CCNC: 106 U/L (ref 40–150)
ALT SERPL W P-5'-P-CCNC: 12 U/L (ref 0–50)
ANION GAP SERPL CALCULATED.3IONS-SCNC: 16 MMOL/L (ref 7–15)
APPEARANCE UR: CLEAR
AST SERPL W P-5'-P-CCNC: 11 U/L (ref 0–45)
BASOPHILS # BLD AUTO: 0 10E3/UL (ref 0–0.2)
BASOPHILS NFR BLD AUTO: 0 %
BILIRUB SERPL-MCNC: 0.2 MG/DL
BILIRUB UR QL STRIP: NEGATIVE
BUN SERPL-MCNC: 18.6 MG/DL (ref 8–23)
CALCIUM SERPL-MCNC: 9.7 MG/DL (ref 8.8–10.2)
CHLORIDE SERPL-SCNC: 102 MMOL/L (ref 98–107)
COLOR UR AUTO: YELLOW
CREAT SERPL-MCNC: 0.75 MG/DL (ref 0.51–0.95)
DEPRECATED HCO3 PLAS-SCNC: 23 MMOL/L (ref 22–29)
EGFRCR SERPLBLD CKD-EPI 2021: 81 ML/MIN/1.73M2
EOSINOPHIL # BLD AUTO: 0 10E3/UL (ref 0–0.7)
EOSINOPHIL NFR BLD AUTO: 0 %
ERYTHROCYTE [DISTWIDTH] IN BLOOD BY AUTOMATED COUNT: 15.1 % (ref 10–15)
GLUCOSE SERPL-MCNC: 134 MG/DL (ref 70–99)
GLUCOSE UR STRIP-MCNC: NEGATIVE MG/DL
HCT VFR BLD AUTO: 33.3 % (ref 35–47)
HGB BLD-MCNC: 10.1 G/DL (ref 11.7–15.7)
HGB UR QL STRIP: NEGATIVE
HOLD SPECIMEN: NORMAL
IMM GRANULOCYTES # BLD: 0 10E3/UL
IMM GRANULOCYTES NFR BLD: 0 %
KETONES UR STRIP-MCNC: NEGATIVE MG/DL
LEUKOCYTE ESTERASE UR QL STRIP: NEGATIVE
LYMPHOCYTES # BLD AUTO: 0.2 10E3/UL (ref 0.8–5.3)
LYMPHOCYTES NFR BLD AUTO: 7 %
MCH RBC QN AUTO: 26.2 PG (ref 26.5–33)
MCHC RBC AUTO-ENTMCNC: 30.3 G/DL (ref 31.5–36.5)
MCV RBC AUTO: 86 FL (ref 78–100)
MONOCYTES # BLD AUTO: 0.1 10E3/UL (ref 0–1.3)
MONOCYTES NFR BLD AUTO: 3 %
MUCOUS THREADS #/AREA URNS LPF: PRESENT /LPF
NEUTROPHILS # BLD AUTO: 2.8 10E3/UL (ref 1.6–8.3)
NEUTROPHILS NFR BLD AUTO: 90 %
NITRATE UR QL: NEGATIVE
NRBC # BLD AUTO: 0 10E3/UL
NRBC BLD AUTO-RTO: 0 /100
PH UR STRIP: 5.5 [PH] (ref 5–7)
PLATELET # BLD AUTO: 255 10E3/UL (ref 150–450)
POTASSIUM SERPL-SCNC: 4.4 MMOL/L (ref 3.4–5.3)
PROCALCITONIN SERPL IA-MCNC: 0.1 NG/ML
PROT SERPL-MCNC: 6.9 G/DL (ref 6.4–8.3)
RBC # BLD AUTO: 3.86 10E6/UL (ref 3.8–5.2)
RBC URINE: 2 /HPF
SODIUM SERPL-SCNC: 141 MMOL/L (ref 135–145)
SP GR UR STRIP: 1.02 (ref 1–1.03)
UROBILINOGEN UR STRIP-MCNC: NORMAL MG/DL
WBC # BLD AUTO: 3.1 10E3/UL (ref 4–11)
WBC URINE: 2 /HPF

## 2024-06-01 PROCEDURE — 99285 EMERGENCY DEPT VISIT HI MDM: CPT | Mod: 25

## 2024-06-01 PROCEDURE — 80053 COMPREHEN METABOLIC PANEL: CPT | Performed by: EMERGENCY MEDICINE

## 2024-06-01 PROCEDURE — 80235 DRUG ASSAY LACOSAMIDE: CPT | Performed by: EMERGENCY MEDICINE

## 2024-06-01 PROCEDURE — 85025 COMPLETE CBC W/AUTO DIFF WBC: CPT | Performed by: EMERGENCY MEDICINE

## 2024-06-01 PROCEDURE — 93005 ELECTROCARDIOGRAM TRACING: CPT

## 2024-06-01 PROCEDURE — 81001 URINALYSIS AUTO W/SCOPE: CPT | Performed by: EMERGENCY MEDICINE

## 2024-06-01 PROCEDURE — 84145 PROCALCITONIN (PCT): CPT | Performed by: EMERGENCY MEDICINE

## 2024-06-01 PROCEDURE — 71046 X-RAY EXAM CHEST 2 VIEWS: CPT

## 2024-06-01 PROCEDURE — 36415 COLL VENOUS BLD VENIPUNCTURE: CPT | Performed by: EMERGENCY MEDICINE

## 2024-06-01 RX ORDER — LACOSAMIDE 50 MG/1
50 TABLET ORAL AT BEDTIME
Qty: 30 TABLET | Refills: 0 | Status: SHIPPED | OUTPATIENT
Start: 2024-06-01 | End: 2024-08-14

## 2024-06-01 ASSESSMENT — ACTIVITIES OF DAILY LIVING (ADL)
ADLS_ACUITY_SCORE: 43

## 2024-06-01 NOTE — ED PROVIDER NOTES
Emergency Department Note      History of Present Illness     Chief Complaint  Sedation    HPI  Olga Bailey is a 78 year old female with a history of glioblastoma and asthma who presents to the ED with ex-spouse for an evaluation of over sedation. The patient's spouse reports that the patient has had recent change to her medication, he notes that she was taking lacosamide since 10/2023 and she was 100 mg in the morning and 100 mg at night.  Ultimately the dose was augmented to 50 mg twice daily.  It was recently reduced during last hospitalization due to concerns of oversedation.  Patient was evaluated by neurology in the hospital who was comfortable with transitioning to 25 mg in the morning and 75 mg at night.      He states with the reduction on lacosamide, she has been more awake.  Previously she was bedbound.  As they have reduced her lacosamide over the last 6 months she has not been able to stand and pivot with assistance.  He is concerned that she continues to have oversedation particularly due to the 75 mg at night.  He adds that she has been more sleepy than usual, delayed response and progressed sedation.  There has been no marli confusion just more delayed responses than her baseline.  She complains of coughing.  She has no other complaints or concerns although her history is limited secondary to aphasia.  He denies any fever, vomiting and diarrhea.     Independent Historian  Spouse and patient, as per HPI.     Review of External Notes  I reviewed discharge summary from 5/10/2024 which patient was admitted for seizure disorder, aspiration versus reactive airway disease versus atelectasis versus pneumonitis with a history of restrictive lung disease.  Past Medical History   Medical History and Problem List  Asthma   Basal cell carcinoma   Chronic pain   Depressive disorder   Dysthymic disorder   Glioblastoma multiforme   Winged scapula   Osteoarthritis   Anxiety   Sleep apnea   Hyperopia    Dermatochalasis    Medications  Amlodipine   Prozac   Lacosamide   Keppra   Loperamide   Zofran   Pantoprazole   Prednisone   Xarelto   Senna-S   Wellbutrin   Trazodone   Seroquel   Lasix   Lacosamide   Olanzapine     Surgical History   Arthroplasty knee   Bunionectomy RT/LT   Colonoscopy   Hysterectomy   IR IVC filter placement   Blepharoplasty   Cervical fusion   Back surgery     Physical Exam   Patient Vitals for the past 24 hrs:   BP Temp Temp src Pulse Resp SpO2 Weight   06/01/24 2303 (!) 135/98 -- -- 86 -- 91 % --   06/01/24 2248 (!) 142/76 -- -- 84 -- 93 % --   06/01/24 2233 138/86 -- -- 89 -- 90 % --   06/01/24 2218 128/86 -- -- 89 -- 91 % --   06/01/24 2203 126/68 -- -- 86 -- 91 % --   06/01/24 2148 (!) 131/108 -- -- 85 -- 92 % --   06/01/24 2133 125/73 -- -- 84 -- 93 % --   06/01/24 2125 -- -- -- -- -- -- 76.4 kg (168 lb 8 oz)   06/01/24 1950 -- -- -- -- -- 90 % --   [birth year]1945 114/76 -- -- -- -- -- --   06/01/24 1930 128/68 -- -- 98 -- -- --   06/01/24 1902 -- -- -- -- -- 95 % --   06/01/24 1901 -- -- -- -- -- 94 % --   06/01/24 1900 126/72 -- -- 83 -- 92 % --   06/01/24 1845 117/61 -- -- 86 -- 91 % --   06/01/24 1844 -- -- -- -- -- 91 % --   06/01/24 1838 139/79 98.1  F (36.7  C) Oral 86 22 93 % --     Physical Exam      HEENT:    Oropharynx is moist, without lesions or trismus.  Eyes:    Conjunctiva normal     PERRL     EOMs intact  Neck:     Supple, no meningismus.     CV:     Regular rate and rhythm.      No murmurs, rubs or gallops.       No unilateral leg swelling.       2+ radial pulses bilateral.    PULM:    Inspiratory rales at left base     No respiratory distress.      Good air exchange.     No wheezing.     No stridor.  ABD:    Soft, non-tender, non-distended.       No pulsatile masses.       No rebound, guarding or rigidity.  MSK:     No gross deformity to all four extremities.   LYMPH:   No cervical lymphadenopathy.  NEURO:   Alert. Oriented x 3  Marked expressive aphasia (chronic  process)   Right-sided weakness  Strength 4/5; left 5/5   Good muscle tone  No tremor   Skin:    Warm, dry    Psych:    Mood is good and affect is appropriate.    Diagnostics   Lab Results   Labs Ordered and Resulted from Time of ED Arrival to Time of ED Departure   ROUTINE UA WITH MICROSCOPIC REFLEX TO CULTURE - Abnormal       Result Value    Color Urine Yellow      Appearance Urine Clear      Glucose Urine Negative      Bilirubin Urine Negative      Ketones Urine Negative      Specific Gravity Urine 1.018      Blood Urine Negative      pH Urine 5.5      Protein Albumin Urine 20 (*)     Urobilinogen Urine Normal      Nitrite Urine Negative      Leukocyte Esterase Urine Negative      Mucus Urine Present (*)     RBC Urine 2      WBC Urine 2     COMPREHENSIVE METABOLIC PANEL - Abnormal    Sodium 141      Potassium 4.4      Carbon Dioxide (CO2) 23      Anion Gap 16 (*)     Urea Nitrogen 18.6      Creatinine 0.75      GFR Estimate 81      Calcium 9.7      Chloride 102      Glucose 134 (*)     Alkaline Phosphatase 106      AST 11      ALT 12      Protein Total 6.9      Albumin 3.8      Bilirubin Total 0.2     CBC WITH PLATELETS AND DIFFERENTIAL - Abnormal    WBC Count 3.1 (*)     RBC Count 3.86      Hemoglobin 10.1 (*)     Hematocrit 33.3 (*)     MCV 86      MCH 26.2 (*)     MCHC 30.3 (*)     RDW 15.1 (*)     Platelet Count 255      % Neutrophils 90      % Lymphocytes 7      % Monocytes 3      % Eosinophils 0      % Basophils 0      % Immature Granulocytes 0      NRBCs per 100 WBC 0      Absolute Neutrophils 2.8      Absolute Lymphocytes 0.2 (*)     Absolute Monocytes 0.1      Absolute Eosinophils 0.0      Absolute Basophils 0.0      Absolute Immature Granulocytes 0.0      Absolute NRBCs 0.0     PROCALCITONIN - Normal    Procalcitonin 0.10     LACOSAMIDE LEVEL       Imaging  Chest XR,  PA & LAT   Final Result   IMPRESSION: Heart is mildly enlarged. Lungs are clear.          EKG   ECG taken at 2016, ECG read at  2045  Sinus rhythm with occasional premature ventricular complexes   Minimal voltage criteria for LVH, may be normal variant (R in aVL)    Borderline ECG  No significant change as compared to prior, dated 05/05/2024.  Rate 82 bpm. MA interval 190 ms. QRS duration 84 ms. QT/QTc 404/472 ms. P-R-T axes 49 -8 18.    Independent Interpretation  I independently reviewed chest x-ray which no pneumothorax or focal infiltrate.  ED Course    Medications Administered  Medications - No data to display    Procedures  Procedures     Discussion of Management  None    Social Determinants of Health adding to complexity of care  None    ED Course  ED Course as of 06/02/24 1423   Sat Jun 01, 2024   1859 I have obtained history and evaluated the patient.        Medical Decision Making / Diagnosis   MDM    Olga Bailey is a 78 year old female presents with concerns of oversedation from her lacosamide.  Patient's ex- is a former surgeon and very involved in her care.  He was concerned that she was excessively sedated secondary to her lacosamide which has been an ongoing issue.  Patient is on Keppra for her generalized seizures but also lacosamide for complex partial seizures.  There has been increasing reduction of her lacosamide and ex- is concerned that there is more room for improvement.  She has no alternative source for oversedation including UTI, pneumonia, significant electrolyte disturbance, hypoglycemia.  I had a prolonged discussion with patient and ex-.  We discussed that we could decrease the evening dose of lacosamide from 75 mg to 50 mg although this would come at the risk of increasing seizure frequency.  Fortunately this is for complex partial seizures and not her generalized tonic-clonic seizure for which she still has coverage with Keppra.  After a lengthy discussion, I was comfortable reducing her evening dose from 75 to 50 mg but recommended that she have close follow-up with neurology to  ensure that they are comfortable with this plan.  Patient and ex- are happy to have a reduction the evening dose and will closely follow-up with neurology.    Disposition  The patient was discharged.     ICD-10 Codes:    ICD-10-CM    1. Generalized weakness  R53.1       2. Seizure disorder (H)  G40.909 Adult Neurology  Referral      3. Sedated due to medication  R40.4     T50.905A       4. Recurrent seizures (H)  G40.909 lacosamide (VIMPAT) 50 MG TABS tablet           Discharge Medications  Discharge Medication List as of 6/1/2024 11:06 PM            Scribe Disclosure:  I, Kavitha Harris, am serving as a scribe at 7:36 PM on 6/1/2024 to document services personally performed by Roshan Smith MD based on my observations and the provider's statements to me.        Roshan Smith MD  06/02/24 [id number]

## 2024-06-02 NOTE — ED NOTES
Neuro CognitiveCognitive/Neuro/Behavioral WDL: all (pt comes in [facility] [place] with increased sleepiness. pt is here after exhusband states pt is more sleepy than normal.)Level of Consciousness: confused; alert (pt has been on vimpat and the dose was changed the beginning of may from 50 mg in the morning and evening to 25 mg in the morning and 75 mg in the enening. pt has a hx of brain cancer with resection, chemo and radiation.)Arousal Level: opens eyes spontaneously (pt currently does not walk and is bedbound. pt has a delay in answering questions and is confused. pt is awake and orientated to self.)Orientation: disoriented to; place; timeSpeech: clear (delayed)Mood/Behavior: calm; cooperative  Ruth Coma ScaleBest Eye Response: 4-->(E4) spontaneousBest Motor Response: 6-->(M6) obeys commandsBest Verbal Response: 4-->(V4) confusedGlasgow Coma Scale Score: 14Assessment Qualifiers: patient not sedated/intubated; no eye obstruction present  Hand /Ankle StrengthHand , Left: weakHand , Right: weakDorsiflexion, Left: weakDorsiflexion, Right: weakPlantarflexion, Left: weakPlantarflexion, Right: weak  Pupils (CN II)Pupil PERRLA: yesPupil Size Left: 2 mmPupil Size Right: 2 mm

## 2024-06-02 NOTE — ED NOTES
pt comes in from [facility] in [place] with increased sleepiness. pt is here after exhusband states pt is more sleepy than normal. pt has been on vimpat and the dose was changed the beginning of may from 50 mg in the morning and evening to 25 mg in the morning and 75 mg in the enening. pt has a hx of brain cancer with resection, chemo and radiation. pt currently does not walk and is bedbound. pt has a delay in answering questions and is confused. pt is awake and orientated to self.

## 2024-06-03 LAB
ATRIAL RATE - MUSE: 82 BPM
DIASTOLIC BLOOD PRESSURE - MUSE: NORMAL MMHG
INTERPRETATION ECG - MUSE: NORMAL
P AXIS - MUSE: 49 DEGREES
PR INTERVAL - MUSE: 190 MS
QRS DURATION - MUSE: 84 MS
QT - MUSE: 404 MS
QTC - MUSE: 472 MS
R AXIS - MUSE: -8 DEGREES
SYSTOLIC BLOOD PRESSURE - MUSE: NORMAL MMHG
T AXIS - MUSE: 18 DEGREES
VENTRICULAR RATE- MUSE: 82 BPM

## 2024-06-04 LAB — LACOSAMIDE SERPL-MCNC: 7.2 UG/ML

## 2024-06-26 NOTE — PROGRESS NOTES
2024   Olga Bailey  [phone] (home)   : 1945  MRN: [id number]  Neuro-Oncologist: Stephanie Baker MD  Neurosurgeon: Dr. Cummings  Attending Radiation Oncologist: Betsy Spann MD PhD    RADIATION ONCOLOGY FOLLOW UP    History of Present Illness:  Ms. 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 She was treated with radiotherapy alone to a total dose of 3500 cGy in 10 fx  completed 10/25/23.      The patient was last seen 23, about 2 months after completing her second course of radiotherapy. She is now about 9 months post-her second RT course.       Interval History:     MR brain today  Impression:  1. Compared to 2023 MRI, there is increased patch peripheral  enhancement of the left frontoparietal resection cavity. Questionable  increased cerebral blood volume about the anterior resection margin.  No significant change in confluent T2 hyperintense signal involving  the bilateral cerebral hemispheres and ashwin. Findings may represent  posttreatment changes, but cannot exclude disease recurrence.  Attention on follow-up is recommended.  2.  Stable dural-based right occipital lesion, stable dating back to  2021.    She presents today with her . Her  reports she has been doing well over the past few months. She works with PT and regularly visits with friends. Her aphasia appears to be improved since last follow up.     She had an ED visit for oversedation on 24.     Today, she specifically reports the following symptoms:    Brain ROS:  Headaches-   Denies  Seizures- Denies  Nausea/vomiting-  Denies  Changes in cognition/behavior- improving aphasia, improved strength and mobility  Vision/hearing changes- Denies  Other focal neuro deficits-  Denies    Review of Systems:  A complete 14 system review was negative except as noted in the  HPI above.     Current systemic therapy: N/A  Anti-epileptic: weaning off with longitudinal provider     Steroids: N/A    Current Outpatient Medications   Medication Sig Dispense Refill    amLODIPine (NORVASC) 5 MG tablet Take 1 tablet (5 mg) by mouth daily      FLUoxetine (PROZAC) 20 MG capsule Take 20 mg by mouth daily      guaiFENesin (MUCINEX) 600 MG 12 hr tablet Take 600 mg by mouth 2 times daily      ipratropium - albuterol 0.5 mg/2.5 mg/3 mL (DUONEB) 0.5-2.5 (3) MG/3ML neb solution Take 1 vial (3 mLs) by nebulization every 6 hours as needed for shortness of breath, wheezing or cough 90 mL 6                  loperamide (IMODIUM) 2 MG capsule Take 4 mg by mouth as needed for diarrhea Then 1 capsule after consecutive stools.      LORazepam (ATIVAN) 0.5 MG tablet Take 0.5 mg by mouth 2 times daily as needed for seizures More than 10 minutes between each doses      multivitamin, therapeutic (THERA-VIT) TABS tablet Take 1 tablet by mouth daily      ondansetron (ZOFRAN) 4 MG tablet Take 1 tablet (4 mg) by mouth every 8 hours as needed for nausea 30 tablet 3    pantoprazole (PROTONIX) 40 MG EC tablet Take 1 tablet (40 mg) by mouth 2 times daily (before meals) 30 tablet 0    polyethylene glycol (MIRALAX) 17 GM/Dose powder Take 17 g by mouth 2 times daily as needed for constipation 510 g 0    pramox-pe-glycerin-petrolatum (PREPARATION H) 1-0.25-14.4-15 % CREA cream Place rectally 3 times daily as needed for hemorrhoids 51 g 0    rivaroxaban ANTICOAGULANT (XARELTO ANTICOAGULANT) 20 MG TABS tablet Take 1 tablet (20 mg) by mouth daily (with dinner) 30 tablet 3    senna-docusate (SENOKOT-S/PERICOLACE) 8.6-50 MG tablet Take 1 tablet by mouth daily as needed for constipation      sodium chloride (OCEAN) 0.65 % nasal spray Spray 1 spray into both nostrils every hour as needed for congestion 104 mL 0    acetaminophen (TYLENOL) 500 MG tablet Take 500-1,000 mg by mouth every 4 hours as needed for mild pain      ipratropium -  "albuterol 0.5 mg/2.5 mg/3 mL (DUONEB) 0.5-2.5 (3) MG/3ML neb solution Take 1 vial (3 mLs) by nebulization every 6 hours 90 mL 6    predniSONE (DELTASONE) 10 MG tablet Take 3 tablets (30 mg) by mouth daily (Patient not taking: Reported on 6/27/2024) 18 tablet 0     No current facility-administered medications for this visit.           Allergies   Allergen Reactions    Pilocarpine Headache and Nausea and Vomiting    Chlorhexidine Itching and Rash    Aripiprazole Headache and Other (See Comments)     Insomnia, nightmares    Bacitracin Rash     Bactroban is effective; No difficulties    Erythromycin      intolerant.    Meperidine Hcl      intolerant only   Demerol    Chlorhexidine Gluconate Rash       Physical Exam:      ECOG: 3  KPS: 50    Vital Signs: Resp 18   Ht 1.676 m (5' 6\")   BMI 27.20 kg/m    Physical Exam:    GENERAL: Sitting comfortably in chair and no distress  HEAD: Normocephalic, atraumatic  EYES: Eyes grossly normal to inspection.  No discharge or erythema, or obvious scleral/conjunctival abnormalities.  NOSE: No discharge, normal appearance   RESP: No audible wheeze, cough, or visible cyanosis.  No visible retractions or increased work of breathing.    SKIN: Visible skin clear. No significant rash, abnormal pigmentation or lesions.  NEURO: Cranial nerves grossly intact. Intermittently responds to questions with single words or short phrases  EXTREMITIES: No obvious edema, normal appearing range of motion       Last CBC with Diff  WBC   Date Value Ref Range Status   07/09/2021 3.8 (L) 4.0 - 11.0 10e9/L Final     WBC Count   Date Value Ref Range Status   06/01/2024 3.1 (L) 4.0 - 11.0 10e3/uL Final     RBC Count   Date Value Ref Range Status   06/01/2024 3.86 3.80 - 5.20 10e6/uL Final   07/09/2021 3.52 (L) 3.8 - 5.2 10e12/L Final     Hemoglobin   Date Value Ref Range Status   06/01/2024 10.1 (L) 11.7 - 15.7 g/dL Final   07/09/2021 10.7 (L) 11.7 - 15.7 g/dL Final     Hematocrit   Date Value Ref Range " Status   06/01/2024 33.3 (L) 35.0 - 47.0 % Final   07/09/2021 33.9 (L) 35.0 - 47.0 % Final     MCV   Date Value Ref Range Status   06/01/2024 86 78 - 100 fL Final   07/09/2021 96 78 - 100 fl Final     MCH   Date Value Ref Range Status   06/01/2024 26.2 (L) 26.5 - 33.0 pg Final   07/09/2021 30.4 26.5 - 33.0 pg Final     MCHC   Date Value Ref Range Status   06/01/2024 30.3 (L) 31.5 - 36.5 g/dL Final   07/09/2021 31.6 31.5 - 36.5 g/dL Final     RDW   Date Value Ref Range Status   06/01/2024 15.1 (H) 10.0 - 15.0 % Final   07/09/2021 14.7 10.0 - 15.0 % Final     Platelet Count   Date Value Ref Range Status   06/01/2024 255 150 - 450 10e3/uL Final   07/09/2021 200 150 - 450 10e9/L Final     Diff Method   Date Value Ref Range Status   07/09/2021 Automated Method  Final     % Neutrophils   Date Value Ref Range Status   06/01/2024 90 % Final   07/09/2021 60.9 % Final     % Lymphocytes   Date Value Ref Range Status   06/01/2024 7 % Final   07/09/2021 9.7 % Final     % Monocytes   Date Value Ref Range Status   06/01/2024 3 % Final   07/09/2021 15.7 % Final     % Eosinophils   Date Value Ref Range Status   06/01/2024 0 % Final   07/09/2021 12.9 % Final     % Basophils   Date Value Ref Range Status   06/01/2024 0 % Final   07/09/2021 0.3 % Final     Absolute Neutrophil   Date Value Ref Range Status   07/09/2021 2.3 1.6 - 8.3 10e9/L Final     Absolute Neutrophils   Date Value Ref Range Status   02/03/2022 2.6 1.6 - 8.3 10e3/uL Final     Absolute Lymphocytes   Date Value Ref Range Status   02/03/2022 0.2 (L) 0.8 - 5.3 10e3/uL Final   07/09/2021 0.4 (L) 0.8 - 5.3 10e9/L Final     Absolute Monocytes   Date Value Ref Range Status   02/03/2022 0.5 0.0 - 1.3 10e3/uL Final   07/09/2021 0.6 0.0 - 1.3 10e9/L Final        Last Comprehensive Metabolic Panel:  Sodium   Date Value Ref Range Status   06/01/2024 141 135 - 145 mmol/L Final     Comment:     Reference intervals for this test were updated on 09/26/2023 to more accurately reflect  our healthy population. There may be differences in the flagging of prior results with similar values performed with this method. Interpretation of those prior results can be made in the context of the updated reference intervals.    07/09/2021 139 133 - 144 mmol/L Final     Potassium   Date Value Ref Range Status   06/01/2024 4.4 3.4 - 5.3 mmol/L Final   01/13/2023 3.9 3.4 - 5.3 mmol/L Final   07/09/2021 3.7 3.4 - 5.3 mmol/L Final     Chloride   Date Value Ref Range Status   06/01/2024 102 98 - 107 mmol/L Final   01/13/2023 106 94 - 109 mmol/L Final   07/09/2021 109 94 - 109 mmol/L Final     Carbon Dioxide   Date Value Ref Range Status   07/09/2021 25 20 - 32 mmol/L Final     Carbon Dioxide (CO2)   Date Value Ref Range Status   06/01/2024 23 22 - 29 mmol/L Final   01/13/2023 28 20 - 32 mmol/L Final     Anion Gap   Date Value Ref Range Status   06/01/2024 16 (H) 7 - 15 mmol/L Final   01/13/2023 6 3 - 14 mmol/L Final   07/09/2021 5 3 - 14 mmol/L Final     Glucose   Date Value Ref Range Status   06/01/2024 134 (H) 70 - 99 mg/dL Final   01/13/2023 93 70 - 99 mg/dL Final   07/09/2021 92 70 - 99 mg/dL Final     GLUCOSE BY METER POCT   Date Value Ref Range Status   05/08/2024 92 70 - 99 mg/dL Final     Urea Nitrogen   Date Value Ref Range Status   06/01/2024 18.6 8.0 - 23.0 mg/dL Final   01/13/2023 16 7 - 30 mg/dL Final   07/09/2021 10 7 - 30 mg/dL Final     Creatinine   Date Value Ref Range Status   06/01/2024 0.75 0.51 - 0.95 mg/dL Final   07/09/2021 0.55 0.52 - 1.04 mg/dL Final     GFR Estimate   Date Value Ref Range Status   06/01/2024 81 >60 mL/min/1.73m2 Final   07/09/2021 >90 >60 mL/min/[1.73_m2] Final     Comment:     Non  GFR Calc  Starting 12/18/2018, serum creatinine based estimated GFR (eGFR) will be   calculated using the Chronic Kidney Disease Epidemiology Collaboration   (CKD-EPI) equation.       Calcium   Date Value Ref Range Status   06/01/2024 9.7 8.8 - 10.2 mg/dL Final   07/09/2021 9.1  8.5 - 10.1 mg/dL Final        Imaging and Diagnostic Studies:   See HPI above    Assessment/Plan:  Ms. 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 She was treated with radiotherapy alone to a total dose of 3500 cGy in 10 fx completed 10/25/23. She has not received additional cancer-directed therapy.     1)  We discussed although the final read from radiology has not returned, there is is concern for local recurrent disease from our review but we cannot rule out treatment related changes especially since she has had 2 courses of hypo fractionated radiotherapy and changes are within the RT field. We will present her imaging at the next tumor board and determine a plan for follow up at that time.     A medical resident participated in the care of this patient and in the preparation of this note. I have verified and edited this note. I personally performed key elements of the physical exam and medical decision making for this patient.  I agree with the assessment and plan of care as documented in the note above.     The overall time I spent on direct patient care including self review of records, labs, and radiographic studies, as well as, direct face-to-face interaction with the patient and coordination of care with other providers was 30 minutes on the day of the encounter.       Betsy Spann MD, PhD     Department of Radiation Oncology  [facility]

## 2024-06-27 ENCOUNTER — HOSPITAL ENCOUNTER (OUTPATIENT)
Dept: MRI IMAGING | Facility: CLINIC | Age: 79
Discharge: HOME | End: 2024-06-27
Attending: STUDENT IN AN ORGANIZED HEALTH CARE EDUCATION/TRAINING PROGRAM | Admitting: STUDENT IN AN ORGANIZED HEALTH CARE EDUCATION/TRAINING PROGRAM
Payer: MEDICARE

## 2024-06-27 ENCOUNTER — OFFICE VISIT (OUTPATIENT)
Dept: RADIATION ONCOLOGY | Facility: CLINIC | Age: 79
End: 2024-06-27
Attending: STUDENT IN AN ORGANIZED HEALTH CARE EDUCATION/TRAINING PROGRAM
Payer: MEDICARE

## 2024-06-27 VITALS — HEIGHT: 66 IN | RESPIRATION RATE: 18 BRPM | BODY MASS INDEX: 27.2 KG/M2

## 2024-06-27 DIAGNOSIS — C71.9 GLIOBLASTOMA (H): ICD-10-CM

## 2024-06-27 DIAGNOSIS — C71.9 GLIOBLASTOMA MULTIFORME OF BRAIN (H): Primary | ICD-10-CM

## 2024-06-27 PROCEDURE — 99214 OFFICE O/P EST MOD 30 MIN: CPT | Mod: GC | Performed by: STUDENT IN AN ORGANIZED HEALTH CARE EDUCATION/TRAINING PROGRAM

## 2024-06-27 PROCEDURE — 255N000002 HC RX 255 OP 636: Performed by: STUDENT IN AN ORGANIZED HEALTH CARE EDUCATION/TRAINING PROGRAM

## 2024-06-27 PROCEDURE — A9585 GADOBUTROL INJECTION: HCPCS | Performed by: STUDENT IN AN ORGANIZED HEALTH CARE EDUCATION/TRAINING PROGRAM

## 2024-06-27 PROCEDURE — 70553 MRI BRAIN STEM W/O & W/DYE: CPT | Mod: 26 | Performed by: RADIOLOGY

## 2024-06-27 PROCEDURE — G0463 HOSPITAL OUTPT CLINIC VISIT: HCPCS | Performed by: STUDENT IN AN ORGANIZED HEALTH CARE EDUCATION/TRAINING PROGRAM

## 2024-06-27 PROCEDURE — 70553 MRI BRAIN STEM W/O & W/DYE: CPT | Mod: MG

## 2024-06-27 PROCEDURE — G1010 CDSM STANSON: HCPCS | Performed by: RADIOLOGY

## 2024-06-27 RX ORDER — GADOBUTROL 604.72 MG/ML
7.5 INJECTION INTRAVENOUS ONCE
Status: COMPLETED | OUTPATIENT
Start: 2024-06-27 | End: 2024-06-27

## 2024-06-27 RX ORDER — GUAIFENESIN 600 MG/1
600 TABLET, EXTENDED RELEASE ORAL 2 TIMES DAILY
COMMUNITY

## 2024-06-27 RX ADMIN — GADOBUTROL 7.5 ML: 604.72 INJECTION INTRAVENOUS at 12:13

## 2024-06-27 ASSESSMENT — PAIN SCALES - GENERAL: PAINLEVEL: NO PAIN (0)

## 2024-06-27 NOTE — LETTER
2024      [place] [address] [facility] [address]      Dear Colleague,    Thank you for referring your patient, Olga Bailey, to the [facility] RADIATION ONCOLOGY. Please see a copy of my visit note below.    2024   Olga Bailey  [phone] (home)   : 1945  MRN: [id number]  Neuro-Oncologist: Stephanie Baker MD  Neurosurgeon: Dr. uCmmings  Attending Radiation Oncologist: Betsy Spann MD PhD    RADIATION ONCOLOGY FOLLOW UP    History of Present Illness:  Ms. 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 She was treated with radiotherapy alone to a total dose of 3500 cGy in 10 fx  completed 10/25/23.      The patient was last seen 23, about 2 months after completing her second course of radiotherapy. She is now about 9 months post-her second RT course.       Interval History:     MR brain today  Impression:  1. Compared to 2023 MRI, there is increased patch peripheral  enhancement of the left frontoparietal resection cavity. Questionable  increased cerebral blood volume about the anterior resection margin.  No significant change in confluent T2 hyperintense signal involving  the bilateral cerebral hemispheres and ashwin. Findings may represent  posttreatment changes, but cannot exclude disease recurrence.  Attention on follow-up is recommended.  2.  Stable dural-based right occipital lesion, stable dating back to  2021.    She presents today with her . Her  reports she has been doing well over the past few months. She works with PT and regularly visits with friends. Her aphasia appears to be improved since last follow up.     She had an ED visit for oversedation on 24.     Today, she specifically reports the following symptoms:    Brain ROS:  Headaches-   Denies  Seizures-  Denies  Nausea/vomiting-  Denies  Changes in cognition/behavior- improving aphasia, improved strength and mobility  Vision/hearing changes- Denies  Other focal neuro deficits-  Denies    Review of Systems:  A complete 14 system review was negative except as noted in the HPI above.     Current systemic therapy: N/A  Anti-epileptic: weaning off with longitudinal provider     Steroids: N/A    Current Outpatient Medications   Medication Sig Dispense Refill     amLODIPine (NORVASC) 5 MG tablet Take 1 tablet (5 mg) by mouth daily       FLUoxetine (PROZAC) 20 MG capsule Take 20 mg by mouth daily       guaiFENesin (MUCINEX) 600 MG 12 hr tablet Take 600 mg by mouth 2 times daily       ipratropium - albuterol 0.5 mg/2.5 mg/3 mL (DUONEB) 0.5-2.5 (3) MG/3ML neb solution Take 1 vial (3 mLs) by nebulization every 6 hours as needed for shortness of breath, wheezing or cough 90 mL 6                     loperamide (IMODIUM) 2 MG capsule Take 4 mg by mouth as needed for diarrhea Then 1 capsule after consecutive stools.       LORazepam (ATIVAN) 0.5 MG tablet Take 0.5 mg by mouth 2 times daily as needed for seizures More than 10 minutes between each doses       multivitamin, therapeutic (THERA-VIT) TABS tablet Take 1 tablet by mouth daily       ondansetron (ZOFRAN) 4 MG tablet Take 1 tablet (4 mg) by mouth every 8 hours as needed for nausea 30 tablet 3     pantoprazole (PROTONIX) 40 MG EC tablet Take 1 tablet (40 mg) by mouth 2 times daily (before meals) 30 tablet 0     polyethylene glycol (MIRALAX) 17 GM/Dose powder Take 17 g by mouth 2 times daily as needed for constipation 510 g 0     pramox-pe-glycerin-petrolatum (PREPARATION H) 1-0.25-14.4-15 % CREA cream Place rectally 3 times daily as needed for hemorrhoids 51 g 0     rivaroxaban ANTICOAGULANT (XARELTO ANTICOAGULANT) 20 MG TABS tablet Take 1 tablet (20 mg) by mouth daily (with dinner) 30 tablet 3     senna-docusate (SENOKOT-S/PERICOLACE) 8.6-50 MG tablet Take 1 tablet by mouth  "daily as needed for constipation       sodium chloride (OCEAN) 0.65 % nasal spray Spray 1 spray into both nostrils every hour as needed for congestion 104 mL 0     acetaminophen (TYLENOL) 500 MG tablet Take 500-1,000 mg by mouth every 4 hours as needed for mild pain       ipratropium - albuterol 0.5 mg/2.5 mg/3 mL (DUONEB) 0.5-2.5 (3) MG/3ML neb solution Take 1 vial (3 mLs) by nebulization every 6 hours 90 mL 6     predniSONE (DELTASONE) 10 MG tablet Take 3 tablets (30 mg) by mouth daily (Patient not taking: Reported on 6/27/2024) 18 tablet 0     No current facility-administered medications for this visit.           Allergies   Allergen Reactions     Pilocarpine Headache and Nausea and Vomiting     Chlorhexidine Itching and Rash     Aripiprazole Headache and Other (See Comments)     Insomnia, nightmares     Bacitracin Rash     Bactroban is effective; No difficulties     Erythromycin      intolerant.     Meperidine Hcl      intolerant only   Demerol     Chlorhexidine Gluconate Rash       Physical Exam:      ECOG: 3  KPS: 50    Vital Signs: Resp 18   Ht 1.676 m (5' 6\")   BMI 27.20 kg/m    Physical Exam:    GENERAL: Sitting comfortably in chair and no distress  HEAD: Normocephalic, atraumatic  EYES: Eyes grossly normal to inspection.  No discharge or erythema, or obvious scleral/conjunctival abnormalities.  NOSE: No discharge, normal appearance   RESP: No audible wheeze, cough, or visible cyanosis.  No visible retractions or increased work of breathing.    SKIN: Visible skin clear. No significant rash, abnormal pigmentation or lesions.  NEURO: Cranial nerves grossly intact. Intermittently responds to questions with single words or short phrases  EXTREMITIES: No obvious edema, normal appearing range of motion       Last CBC with Diff  WBC   Date Value Ref Range Status   07/09/2021 3.8 (L) 4.0 - 11.0 10e9/L Final     WBC Count   Date Value Ref Range Status   06/01/2024 3.1 (L) 4.0 - 11.0 10e3/uL Final     RBC Count "   Date Value Ref Range Status   06/01/2024 3.86 3.80 - 5.20 10e6/uL Final   07/09/2021 3.52 (L) 3.8 - 5.2 10e12/L Final     Hemoglobin   Date Value Ref Range Status   06/01/2024 10.1 (L) 11.7 - 15.7 g/dL Final   07/09/2021 10.7 (L) 11.7 - 15.7 g/dL Final     Hematocrit   Date Value Ref Range Status   06/01/2024 33.3 (L) 35.0 - 47.0 % Final   07/09/2021 33.9 (L) 35.0 - 47.0 % Final     MCV   Date Value Ref Range Status   06/01/2024 86 78 - 100 fL Final   07/09/2021 96 78 - 100 fl Final     MCH   Date Value Ref Range Status   06/01/2024 26.2 (L) 26.5 - 33.0 pg Final   07/09/2021 30.4 26.5 - 33.0 pg Final     MCHC   Date Value Ref Range Status   06/01/2024 30.3 (L) 31.5 - 36.5 g/dL Final   07/09/2021 31.6 31.5 - 36.5 g/dL Final     RDW   Date Value Ref Range Status   06/01/2024 15.1 (H) 10.0 - 15.0 % Final   07/09/2021 14.7 10.0 - 15.0 % Final     Platelet Count   Date Value Ref Range Status   06/01/2024 255 150 - 450 10e3/uL Final   07/09/2021 200 150 - 450 10e9/L Final     Diff Method   Date Value Ref Range Status   07/09/2021 Automated Method  Final     % Neutrophils   Date Value Ref Range Status   06/01/2024 90 % Final   07/09/2021 60.9 % Final     % Lymphocytes   Date Value Ref Range Status   06/01/2024 7 % Final   07/09/2021 9.7 % Final     % Monocytes   Date Value Ref Range Status   06/01/2024 3 % Final   07/09/2021 15.7 % Final     % Eosinophils   Date Value Ref Range Status   06/01/2024 0 % Final   07/09/2021 12.9 % Final     % Basophils   Date Value Ref Range Status   06/01/2024 0 % Final   07/09/2021 0.3 % Final     Absolute Neutrophil   Date Value Ref Range Status   07/09/2021 2.3 1.6 - 8.3 10e9/L Final     Absolute Neutrophils   Date Value Ref Range Status   02/03/2022 2.6 1.6 - 8.3 10e3/uL Final     Absolute Lymphocytes   Date Value Ref Range Status   02/03/2022 0.2 (L) 0.8 - 5.3 10e3/uL Final   07/09/2021 0.4 (L) 0.8 - 5.3 10e9/L Final     Absolute Monocytes   Date Value Ref Range Status   02/03/2022 0.5  0.0 - 1.3 10e3/uL Final   07/09/2021 0.6 0.0 - 1.3 10e9/L Final        Last Comprehensive Metabolic Panel:  Sodium   Date Value Ref Range Status   06/01/2024 141 135 - 145 mmol/L Final     Comment:     Reference intervals for this test were updated on 09/26/2023 to more accurately reflect our healthy population. There may be differences in the flagging of prior results with similar values performed with this method. Interpretation of those prior results can be made in the context of the updated reference intervals.    07/09/2021 139 133 - 144 mmol/L Final     Potassium   Date Value Ref Range Status   06/01/2024 4.4 3.4 - 5.3 mmol/L Final   01/13/2023 3.9 3.4 - 5.3 mmol/L Final   07/09/2021 3.7 3.4 - 5.3 mmol/L Final     Chloride   Date Value Ref Range Status   06/01/2024 102 98 - 107 mmol/L Final   01/13/2023 106 94 - 109 mmol/L Final   07/09/2021 109 94 - 109 mmol/L Final     Carbon Dioxide   Date Value Ref Range Status   07/09/2021 25 20 - 32 mmol/L Final     Carbon Dioxide (CO2)   Date Value Ref Range Status   06/01/2024 23 22 - 29 mmol/L Final   01/13/2023 28 20 - 32 mmol/L Final     Anion Gap   Date Value Ref Range Status   06/01/2024 16 (H) 7 - 15 mmol/L Final   01/13/2023 6 3 - 14 mmol/L Final   07/09/2021 5 3 - 14 mmol/L Final     Glucose   Date Value Ref Range Status   06/01/2024 134 (H) 70 - 99 mg/dL Final   01/13/2023 93 70 - 99 mg/dL Final   07/09/2021 92 70 - 99 mg/dL Final     GLUCOSE BY METER POCT   Date Value Ref Range Status   05/08/2024 92 70 - 99 mg/dL Final     Urea Nitrogen   Date Value Ref Range Status   06/01/2024 18.6 8.0 - 23.0 mg/dL Final   01/13/2023 16 7 - 30 mg/dL Final   07/09/2021 10 7 - 30 mg/dL Final     Creatinine   Date Value Ref Range Status   06/01/2024 0.75 0.51 - 0.95 mg/dL Final   07/09/2021 0.55 0.52 - 1.04 mg/dL Final     GFR Estimate   Date Value Ref Range Status   06/01/2024 81 >60 mL/min/1.73m2 Final   07/09/2021 >90 >60 mL/min/[1.73_m2] Final     Comment:     Non   American GFR Calc  Starting 12/18/2018, serum creatinine based estimated GFR (eGFR) will be   calculated using the Chronic Kidney Disease Epidemiology Collaboration   (CKD-EPI) equation.       Calcium   Date Value Ref Range Status   06/01/2024 9.7 8.8 - 10.2 mg/dL Final   07/09/2021 9.1 8.5 - 10.1 mg/dL Final        Imaging and Diagnostic Studies:   See HPI above    Assessment/Plan:  Ms. 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 She was treated with radiotherapy alone to a total dose of 3500 cGy in 10 fx completed 10/25/23. She has not received additional cancer-directed therapy.     1)  We discussed although the final read from radiology has not returned, there is is concern for local recurrent disease from our review but we cannot rule out treatment related changes especially since she has had 2 courses of hypo fractionated radiotherapy and changes are within the RT field. We will present her imaging at the next tumor board and determine a plan for follow up at that time.     A medical resident participated in the care of this patient and in the preparation of this note. I have verified and edited this note. I personally performed key elements of the physical exam and medical decision making for this patient.  I agree with the assessment and plan of care as documented in the note above.     The overall time I spent on direct patient care including self review of records, labs, and radiographic studies, as well as, direct face-to-face interaction with the patient and coordination of care with other providers was 30 minutes on the day of the encounter.       Betsy Spann MD, PhD     Department of Radiation Oncology  [facility]         Oncology Rooming Note    June 27, 2024 10:56 AM   Olga Bailey is a 78 year old female  "who presents for:    Chief Complaint   Patient presents with     Radiation Therapy     Follow up to radiation therapy     Initial Vitals: There were no vitals taken for this visit. Estimated body mass index is 26.39 kg/m  as calculated from the following:    Height as of 10/18/23: 1.702 m (5' 7\").    Weight as of 6/1/24: 76.4 kg (168 lb 8 oz). There is no height or weight on file to calculate BSA.  Data Unavailable Comment: Data Unavailable   No LMP recorded. Patient has had a hysterectomy.  Allergies reviewed: Yes  Medications reviewed: Yes    Medications: Medication refills not needed today.  Pharmacy name entered into [facility]: [facility] - [place], [place] - [address]    Frailty Screening:   Is the patient here for a new oncology consult visit in cancer care? 2. No      Clinical concerns: Denies, reports doing significantly better with PT/OT.   Dr. Spann was notified.      Mariel Smiley RN                Again, thank you for allowing me to participate in the care of your patient.        Sincerely,        Betsy Spann MD PhD  "

## 2024-06-27 NOTE — PROGRESS NOTES
"Oncology Rooming Note    June 27, 2024 10:56 AM   Olga Bailey is a 78 year old female who presents for:    Chief Complaint   Patient presents with    Radiation Therapy     Follow up to radiation therapy     Initial Vitals: There were no vitals taken for this visit. Estimated body mass index is 26.39 kg/m  as calculated from the following:    Height as of 10/18/23: 1.702 m (5' 7\").    Weight as of 6/1/24: 76.4 kg (168 lb 8 oz). There is no height or weight on file to calculate BSA.  Data Unavailable Comment: Data Unavailable   No LMP recorded. Patient has had a hysterectomy.  Allergies reviewed: Yes  Medications reviewed: Yes    Medications: Medication refills not needed today.  Pharmacy name entered into [facility]: [facility] - [place], [place] - [address] [place] [address]    Frailty Screening:   Is the patient here for a new oncology consult visit in cancer care? 2. No      Clinical concerns: Denies, reports doing significantly better with PT/OT.   Dr. Spann was notified.      Mariel Smiley RN              "

## 2024-07-01 ENCOUNTER — TUMOR CONFERENCE (OUTPATIENT)
Dept: ONCOLOGY | Facility: CLINIC | Age: 79
End: 2024-07-01
Payer: MEDICARE

## 2024-07-02 DIAGNOSIS — C71.9 GBM (GLIOBLASTOMA MULTIFORME) (H): Primary | ICD-10-CM

## 2024-07-03 ENCOUNTER — DOCUMENTATION ONLY (OUTPATIENT)
Dept: RADIATION ONCOLOGY | Facility: CLINIC | Age: 79
End: 2024-07-03
Payer: MEDICARE

## 2024-07-03 NOTE — PROGRESS NOTES
Radiation Oncology Chart Note    Discussed Tumor Board consensus with pt and . Plan for 1 month (early August) MRI brain prior to follow up with me. Dr. Baker's team will connect with her to discuss follow up.      Betsy Spann MD, PhD     Department of Radiation Oncology  [facility]

## 2024-08-08 ENCOUNTER — HOSPITAL ENCOUNTER (OUTPATIENT)
Dept: MRI IMAGING | Facility: CLINIC | Age: 79
Discharge: HOME | End: 2024-08-08
Attending: STUDENT IN AN ORGANIZED HEALTH CARE EDUCATION/TRAINING PROGRAM | Admitting: STUDENT IN AN ORGANIZED HEALTH CARE EDUCATION/TRAINING PROGRAM
Payer: MEDICARE

## 2024-08-08 ENCOUNTER — OFFICE VISIT (OUTPATIENT)
Dept: RADIATION ONCOLOGY | Facility: CLINIC | Age: 79
End: 2024-08-08
Attending: STUDENT IN AN ORGANIZED HEALTH CARE EDUCATION/TRAINING PROGRAM
Payer: MEDICARE

## 2024-08-08 VITALS
RESPIRATION RATE: 20 BRPM | HEART RATE: 77 BPM | DIASTOLIC BLOOD PRESSURE: 54 MMHG | SYSTOLIC BLOOD PRESSURE: 134 MMHG | OXYGEN SATURATION: 94 %

## 2024-08-08 DIAGNOSIS — C71.9 GBM (GLIOBLASTOMA MULTIFORME) (H): ICD-10-CM

## 2024-08-08 DIAGNOSIS — C71.9 GBM (GLIOBLASTOMA MULTIFORME) (H): Primary | ICD-10-CM

## 2024-08-08 LAB — RADIOLOGIST FLAGS: NORMAL

## 2024-08-08 PROCEDURE — 70553 MRI BRAIN STEM W/O & W/DYE: CPT

## 2024-08-08 PROCEDURE — G0463 HOSPITAL OUTPT CLINIC VISIT: HCPCS | Performed by: STUDENT IN AN ORGANIZED HEALTH CARE EDUCATION/TRAINING PROGRAM

## 2024-08-08 PROCEDURE — 255N000002 HC RX 255 OP 636: Performed by: STUDENT IN AN ORGANIZED HEALTH CARE EDUCATION/TRAINING PROGRAM

## 2024-08-08 PROCEDURE — 999N000128 HC STATISTIC PERIPHERAL IV START W/O US GUIDANCE

## 2024-08-08 PROCEDURE — 99213 OFFICE O/P EST LOW 20 MIN: CPT | Mod: GC | Performed by: STUDENT IN AN ORGANIZED HEALTH CARE EDUCATION/TRAINING PROGRAM

## 2024-08-08 PROCEDURE — A9585 GADOBUTROL INJECTION: HCPCS | Performed by: STUDENT IN AN ORGANIZED HEALTH CARE EDUCATION/TRAINING PROGRAM

## 2024-08-08 PROCEDURE — 70553 MRI BRAIN STEM W/O & W/DYE: CPT | Mod: 26 | Performed by: STUDENT IN AN ORGANIZED HEALTH CARE EDUCATION/TRAINING PROGRAM

## 2024-08-08 RX ORDER — ZONISAMIDE 50 MG/1
50 CAPSULE ORAL 2 TIMES DAILY
COMMUNITY
Start: 2024-07-29

## 2024-08-08 RX ORDER — GADOBUTROL 604.72 MG/ML
7.5 INJECTION INTRAVENOUS ONCE
Status: COMPLETED | OUTPATIENT
Start: 2024-08-08 | End: 2024-08-08

## 2024-08-08 RX ADMIN — GADOBUTROL 7.5 ML: 604.72 INJECTION INTRAVENOUS at 12:21

## 2024-08-08 ASSESSMENT — PAIN SCALES - GENERAL: PAINLEVEL: NO PAIN (0)

## 2024-08-08 NOTE — PROGRESS NOTES
"Oncology Rooming Note    August 8, 2024 1:31 PM   Olga Bailey is a 78 year old female who presents for:    Chief Complaint   Patient presents with    Oncology Clinic Visit     Return after Radiation Therapy     Initial Vitals: /54 (BP Location: Left arm, Patient Position: Sitting, Cuff Size: Adult Regular)   Pulse 77   Resp 20   SpO2 94%  Estimated body mass index is 27.2 kg/m  as calculated from the following:    Height as of 6/27/24: 1.676 m (5' 6\").    Weight as of 6/1/24: 76.4 kg (168 lb 8 oz). There is no height or weight on file to calculate BSA.  No Pain (0) Comment: Data Unavailable   No LMP recorded. Patient has had a hysterectomy.  Allergies reviewed: Yes  Medications reviewed: Yes    Medications: Medication refills not needed today.  Pharmacy name entered into [facility]: [facility] - [place], [place] - [address]    Frailty Screening:   Is the patient here for a new oncology consult visit in cancer care? 2. No      Clinical concerns: Getting stronger working with therapy. Expressive aphasia. A few partial seizures during medication change - R facial twitching. Now is on zonisamided 50mg BID. Of note tremor of left hand- spouse states is peter. This visit information received from Spouse, pt denied pain and would maintain eye contact, but limited verbal and not able to understand. MRI results.  Dr Nevarez was notified.      Stephanie Morgan RN            "

## 2024-08-08 NOTE — LETTER
"2024      [place] [address] [address] [facility] [address]      Dear Colleague,    Thank you for referring your patient, Olga Bailey, to the [facility] RADIATION ONCOLOGY. Please see a copy of my visit note below.    Oncology Rooming Note    2024 1:31 PM   Olga Bailey is a 78 year old female who presents for:    Chief Complaint   Patient presents with     Oncology Clinic Visit     Return after Radiation Therapy     Initial Vitals: /54 (BP Location: Left arm, Patient Position: Sitting, Cuff Size: Adult Regular)   Pulse 77   Resp 20   SpO2 94%  Estimated body mass index is 27.2 kg/m  as calculated from the following:    Height as of 24: 1.676 m (5' 6\").    Weight as of 24: 76.4 kg (168 lb 8 oz). There is no height or weight on file to calculate BSA.  No Pain (0) Comment: Data Unavailable   No LMP recorded. Patient has had a hysterectomy.  Allergies reviewed: Yes  Medications reviewed: Yes    Medications: Medication refills not needed today.  Pharmacy name entered into [employer]: [facility] - [place], [place] - [address]    Frailty Screening:   Is the patient here for a new oncology consult visit in cancer care? 2. No      Clinical concerns: Getting stronger working with therapy. Expressive aphasia. A few partial seizures during medication change - R facial twitching. Now is on zonisamided 50mg BID. Of note tremor of left hand- spouse states is peter. This visit information received from Spouse, pt denied pain and would maintain eye contact, but limited verbal and not able to understand. MRI results.  Dr Nevarez was notified.      Stephanie Mogran RN              Aug 8, 2024  Olga Bailey  [phone] (home)   : 1945  MRN: [id number]  Neuro-Oncologist: Stephanie Baker MD  Neurosurgeon: Dr. Cummings  Attending Radiation Oncologist: Betsy Spann MD PhD    RADIATION ONCOLOGY FOLLOW UP    History of Present " Illness:  Ms. 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 She was treated with radiotherapy alone to a total dose of 3500 cGy in 10 fx  completed 10/25/23.      The patient was last seen 6/27/24. She is now about 9 months post-her second RT course.       Interval History:     MR brain 8/8/24  Impression:  1. Compared to 12/18/2023 MRI, there is increased patch peripheral  enhancement of the left frontoparietal resection cavity. Questionable  increased cerebral blood volume about the anterior resection margin.  No significant change in confluent T2 hyperintense signal involving  the bilateral cerebral hemispheres and ashwin. Findings may represent  posttreatment changes, but cannot exclude disease recurrence.  Attention on follow-up is recommended.  2.  Stable dural-based right occipital lesion, stable dating back to  2/19/2021.    She presents today with her . Her  reports she has been doing well over the past few months. She works with PT and has regained a lot of strength in her right side, now tolerating some weight bearing in all extremities. Her aphasia appears to worsened since starting lacosamide, though she has remained seizure free.       Today, she specifically reports the following symptoms:    Brain ROS:  Headaches-   Denies  Seizures- Denies  Nausea/vomiting-  Denies  Changes in cognition/behavior- improving aphasia, improved strength and mobility  Vision/hearing changes- Denies  Other focal neuro deficits-  Denies    Review of Systems:  Denies additional symptoms related to current diagnosis.      Current systemic therapy: N/A  Anti-epileptic: lacosamide  Steroids: N/A    Current Outpatient Medications   Medication Sig Dispense Refill     amLODIPine (NORVASC) 5 MG tablet Take 1 tablet (5 mg) by mouth daily       FLUoxetine (PROZAC) 20 MG capsule Take 20 mg by  mouth daily       guaiFENesin (MUCINEX) 600 MG 12 hr tablet Take 600 mg by mouth 2 times daily       ipratropium - albuterol 0.5 mg/2.5 mg/3 mL (DUONEB) 0.5-2.5 (3) MG/3ML neb solution Take 1 vial (3 mLs) by nebulization every 6 hours as needed for shortness of breath, wheezing or cough 90 mL 6                     loperamide (IMODIUM) 2 MG capsule Take 4 mg by mouth as needed for diarrhea Then 1 capsule after consecutive stools.       LORazepam (ATIVAN) 0.5 MG tablet Take 0.5 mg by mouth 2 times daily as needed for seizures More than 10 minutes between each doses       multivitamin, therapeutic (THERA-VIT) TABS tablet Take 1 tablet by mouth daily       ondansetron (ZOFRAN) 4 MG tablet Take 1 tablet (4 mg) by mouth every 8 hours as needed for nausea 30 tablet 3     pantoprazole (PROTONIX) 40 MG EC tablet Take 1 tablet (40 mg) by mouth 2 times daily (before meals) 30 tablet 0     polyethylene glycol (MIRALAX) 17 GM/Dose powder Take 17 g by mouth 2 times daily as needed for constipation 510 g 0     pramox-pe-glycerin-petrolatum (PREPARATION H) 1-0.25-14.4-15 % CREA cream Place rectally 3 times daily as needed for hemorrhoids 51 g 0     rivaroxaban ANTICOAGULANT (XARELTO ANTICOAGULANT) 20 MG TABS tablet Take 1 tablet (20 mg) by mouth daily (with dinner) 30 tablet 3     senna-docusate (SENOKOT-S/PERICOLACE) 8.6-50 MG tablet Take 1 tablet by mouth daily as needed for constipation       sodium chloride (OCEAN) 0.65 % nasal spray Spray 1 spray into both nostrils every hour as needed for congestion 104 mL 0     acetaminophen (TYLENOL) 500 MG tablet Take 500-1,000 mg by mouth every 4 hours as needed for mild pain       ipratropium - albuterol 0.5 mg/2.5 mg/3 mL (DUONEB) 0.5-2.5 (3) MG/3ML neb solution Take 1 vial (3 mLs) by nebulization every 6 hours 90 mL 6     predniSONE (DELTASONE) 10 MG tablet Take 3 tablets (30 mg) by mouth daily (Patient not taking: Reported on 6/27/2024) 18 tablet 0     No current facility-administered  medications for this visit.           Allergies   Allergen Reactions     Pilocarpine Headache and Nausea and Vomiting     Chlorhexidine Itching and Rash     Aripiprazole Headache and Other (See Comments)     Insomnia, nightmares     Bacitracin Rash     Bactroban is effective; No difficulties     Erythromycin      intolerant.     Meperidine Hcl      intolerant only   Demerol     Chlorhexidine Gluconate Rash       Physical Exam:      ECOG: 3  KPS: 50    Vital Signs: /54 (BP Location: Left arm, Patient Position: Sitting, Cuff Size: Adult Regular)   Pulse 77   Resp 20   SpO2 94%   Physical Exam:    GENERAL: Sitting comfortably in chair and no distress  HEAD: Normocephalic, atraumatic  EYES: Eyes grossly normal to inspection.  No discharge or erythema, or obvious scleral/conjunctival abnormalities.  NOSE: No discharge, normal appearance   RESP: No audible wheeze, cough, or visible cyanosis.  No visible retractions or increased work of breathing.    SKIN: Visible skin clear. No significant rash, abnormal pigmentation or lesions.  NEURO: Cranial nerves grossly intact. Intermittently responds to questions with single words or short phrases.   EXTREMITIES: No obvious edema, normal appearing range of motion       Last CBC with Diff  WBC   Date Value Ref Range Status   07/09/2021 3.8 (L) 4.0 - 11.0 10e9/L Final     WBC Count   Date Value Ref Range Status   06/01/2024 3.1 (L) 4.0 - 11.0 10e3/uL Final     RBC Count   Date Value Ref Range Status   06/01/2024 3.86 3.80 - 5.20 10e6/uL Final   07/09/2021 3.52 (L) 3.8 - 5.2 10e12/L Final     Hemoglobin   Date Value Ref Range Status   06/01/2024 10.1 (L) 11.7 - 15.7 g/dL Final   07/09/2021 10.7 (L) 11.7 - 15.7 g/dL Final     Hematocrit   Date Value Ref Range Status   06/01/2024 33.3 (L) 35.0 - 47.0 % Final   07/09/2021 33.9 (L) 35.0 - 47.0 % Final     MCV   Date Value Ref Range Status   06/01/2024 86 78 - 100 fL Final   07/09/2021 96 78 - 100 fl Final     MCH   Date Value Ref  Range Status   06/01/2024 26.2 (L) 26.5 - 33.0 pg Final   07/09/2021 30.4 26.5 - 33.0 pg Final     MCHC   Date Value Ref Range Status   06/01/2024 30.3 (L) 31.5 - 36.5 g/dL Final   07/09/2021 31.6 31.5 - 36.5 g/dL Final     RDW   Date Value Ref Range Status   06/01/2024 15.1 (H) 10.0 - 15.0 % Final   07/09/2021 14.7 10.0 - 15.0 % Final     Platelet Count   Date Value Ref Range Status   06/01/2024 255 150 - 450 10e3/uL Final   07/09/2021 200 150 - 450 10e9/L Final     Diff Method   Date Value Ref Range Status   07/09/2021 Automated Method  Final     % Neutrophils   Date Value Ref Range Status   06/01/2024 90 % Final   07/09/2021 60.9 % Final     % Lymphocytes   Date Value Ref Range Status   06/01/2024 7 % Final   07/09/2021 9.7 % Final     % Monocytes   Date Value Ref Range Status   06/01/2024 3 % Final   07/09/2021 15.7 % Final     % Eosinophils   Date Value Ref Range Status   06/01/2024 0 % Final   07/09/2021 12.9 % Final     % Basophils   Date Value Ref Range Status   06/01/2024 0 % Final   07/09/2021 0.3 % Final     Absolute Neutrophil   Date Value Ref Range Status   07/09/2021 2.3 1.6 - 8.3 10e9/L Final     Absolute Neutrophils   Date Value Ref Range Status   02/03/2022 2.6 1.6 - 8.3 10e3/uL Final     Absolute Lymphocytes   Date Value Ref Range Status   02/03/2022 0.2 (L) 0.8 - 5.3 10e3/uL Final   07/09/2021 0.4 (L) 0.8 - 5.3 10e9/L Final     Absolute Monocytes   Date Value Ref Range Status   02/03/2022 0.5 0.0 - 1.3 10e3/uL Final   07/09/2021 0.6 0.0 - 1.3 10e9/L Final        Last Comprehensive Metabolic Panel:  Sodium   Date Value Ref Range Status   06/01/2024 141 135 - 145 mmol/L Final     Comment:     Reference intervals for this test were updated on 09/26/2023 to more accurately reflect our healthy population. There may be differences in the flagging of prior results with similar values performed with this method. Interpretation of those prior results can be made in the context of the updated reference  intervals.    07/09/2021 139 133 - 144 mmol/L Final     Potassium   Date Value Ref Range Status   06/01/2024 4.4 3.4 - 5.3 mmol/L Final   01/13/2023 3.9 3.4 - 5.3 mmol/L Final   07/09/2021 3.7 3.4 - 5.3 mmol/L Final     Chloride   Date Value Ref Range Status   06/01/2024 102 98 - 107 mmol/L Final   01/13/2023 106 94 - 109 mmol/L Final   07/09/2021 109 94 - 109 mmol/L Final     Carbon Dioxide   Date Value Ref Range Status   07/09/2021 25 20 - 32 mmol/L Final     Carbon Dioxide (CO2)   Date Value Ref Range Status   06/01/2024 23 22 - 29 mmol/L Final   01/13/2023 28 20 - 32 mmol/L Final     Anion Gap   Date Value Ref Range Status   06/01/2024 16 (H) 7 - 15 mmol/L Final   01/13/2023 6 3 - 14 mmol/L Final   07/09/2021 5 3 - 14 mmol/L Final     Glucose   Date Value Ref Range Status   06/01/2024 134 (H) 70 - 99 mg/dL Final   01/13/2023 93 70 - 99 mg/dL Final   07/09/2021 92 70 - 99 mg/dL Final     GLUCOSE BY METER POCT   Date Value Ref Range Status   05/08/2024 92 70 - 99 mg/dL Final     Urea Nitrogen   Date Value Ref Range Status   06/01/2024 18.6 8.0 - 23.0 mg/dL Final   01/13/2023 16 7 - 30 mg/dL Final   07/09/2021 10 7 - 30 mg/dL Final     Creatinine   Date Value Ref Range Status   06/01/2024 0.75 0.51 - 0.95 mg/dL Final   07/09/2021 0.55 0.52 - 1.04 mg/dL Final     GFR Estimate   Date Value Ref Range Status   06/01/2024 81 >60 mL/min/1.73m2 Final   07/09/2021 >90 >60 mL/min/[1.73_m2] Final     Comment:     Non  GFR Calc  Starting 12/18/2018, serum creatinine based estimated GFR (eGFR) will be   calculated using the Chronic Kidney Disease Epidemiology Collaboration   (CKD-EPI) equation.       Calcium   Date Value Ref Range Status   06/01/2024 9.7 8.8 - 10.2 mg/dL Final   07/09/2021 9.1 8.5 - 10.1 mg/dL Final        Imaging and Diagnostic Studies:   See HPI above    Assessment/Plan:  Ms. 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 She was treated with radiotherapy alone to a total dose of 3500 cGy in 10 fx completed 10/25/23. She has not received additional cancer-directed therapy.     1)  We discussed although the final read from radiology has not returned, it is concerning for progression of disease based on our review. We will call with the final results. Olga is meeting with Dr. Baker on Tuesday and we will discuss her case at CNS tumor board next week.     Luís Nevarez MD  PGY-4 Radiation Oncology  Department of Radiation Oncology  [facility]  Phone: [phone]    A medical resident participated in the care of this patient and in the preparation of this note. I have verified and edited this note. I personally performed key elements of the physical exam and medical decision making for this patient.  I agree with the assessment and plan of care as documented in the note above.    The overall time I spent on direct patient care including self review of records, labs, and radiographic studies, as well as, direct face-to-face interaction with the patient and coordination of care with other providers was 25 minutes on the day of the encounter.       Betsy Spann MD, PhD     Department of Radiation Oncology  [facility]         Again, thank you for allowing me to participate in the care of your patient.        Sincerely,        Betsy Spann MD PhD

## 2024-08-08 NOTE — PROGRESS NOTES
Aug 8, 2024  Olga Bailey  [phone] (home)   : 1945  MRN: [id number]  Neuro-Oncologist: Stephanie Baker MD  Neurosurgeon: Dr. Cummings  Attending Radiation Oncologist: Betsy Spann MD PhD    RADIATION ONCOLOGY FOLLOW UP    History of Present Illness:  Ms. 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 She was treated with radiotherapy alone to a total dose of 3500 cGy in 10 fx  completed 10/25/23.      The patient was last seen 24. She is now about 9 months post-her second RT course.       Interval History:     MR brain 24  Impression:  1. Compared to 2023 MRI, there is increased patch peripheral  enhancement of the left frontoparietal resection cavity. Questionable  increased cerebral blood volume about the anterior resection margin.  No significant change in confluent T2 hyperintense signal involving  the bilateral cerebral hemispheres and ashwin. Findings may represent  posttreatment changes, but cannot exclude disease recurrence.  Attention on follow-up is recommended.  2.  Stable dural-based right occipital lesion, stable dating back to  2021.    She presents today with her . Her  reports she has been doing well over the past few months. She works with PT and has regained a lot of strength in her right side, now tolerating some weight bearing in all extremities. Her aphasia appears to worsened since starting lacosamide, though she has remained seizure free.       Today, she specifically reports the following symptoms:    Brain ROS:  Headaches-   Denies  Seizures- Denies  Nausea/vomiting-  Denies  Changes in cognition/behavior- improving aphasia, improved strength and mobility  Vision/hearing changes- Denies  Other focal neuro deficits-  Denies    Review of Systems:  Denies additional symptoms related to current diagnosis.       Current systemic therapy: N/A  Anti-epileptic: lacosamide  Steroids: N/A    Current Outpatient Medications   Medication Sig Dispense Refill    amLODIPine (NORVASC) 5 MG tablet Take 1 tablet (5 mg) by mouth daily      FLUoxetine (PROZAC) 20 MG capsule Take 20 mg by mouth daily      guaiFENesin (MUCINEX) 600 MG 12 hr tablet Take 600 mg by mouth 2 times daily      ipratropium - albuterol 0.5 mg/2.5 mg/3 mL (DUONEB) 0.5-2.5 (3) MG/3ML neb solution Take 1 vial (3 mLs) by nebulization every 6 hours as needed for shortness of breath, wheezing or cough 90 mL 6                  loperamide (IMODIUM) 2 MG capsule Take 4 mg by mouth as needed for diarrhea Then 1 capsule after consecutive stools.      LORazepam (ATIVAN) 0.5 MG tablet Take 0.5 mg by mouth 2 times daily as needed for seizures More than 10 minutes between each doses      multivitamin, therapeutic (THERA-VIT) TABS tablet Take 1 tablet by mouth daily      ondansetron (ZOFRAN) 4 MG tablet Take 1 tablet (4 mg) by mouth every 8 hours as needed for nausea 30 tablet 3    pantoprazole (PROTONIX) 40 MG EC tablet Take 1 tablet (40 mg) by mouth 2 times daily (before meals) 30 tablet 0    polyethylene glycol (MIRALAX) 17 GM/Dose powder Take 17 g by mouth 2 times daily as needed for constipation 510 g 0    pramox-pe-glycerin-petrolatum (PREPARATION H) 1-0.25-14.4-15 % CREA cream Place rectally 3 times daily as needed for hemorrhoids 51 g 0    rivaroxaban ANTICOAGULANT (XARELTO ANTICOAGULANT) 20 MG TABS tablet Take 1 tablet (20 mg) by mouth daily (with dinner) 30 tablet 3    senna-docusate (SENOKOT-S/PERICOLACE) 8.6-50 MG tablet Take 1 tablet by mouth daily as needed for constipation      sodium chloride (OCEAN) 0.65 % nasal spray Spray 1 spray into both nostrils every hour as needed for congestion 104 mL 0    acetaminophen (TYLENOL) 500 MG tablet Take 500-1,000 mg by mouth every 4 hours as needed for mild pain      ipratropium - albuterol 0.5 mg/2.5 mg/3 mL (DUONEB) 0.5-2.5  (3) MG/3ML neb solution Take 1 vial (3 mLs) by nebulization every 6 hours 90 mL 6    predniSONE (DELTASONE) 10 MG tablet Take 3 tablets (30 mg) by mouth daily (Patient not taking: Reported on 6/27/2024) 18 tablet 0     No current facility-administered medications for this visit.           Allergies   Allergen Reactions    Pilocarpine Headache and Nausea and Vomiting    Chlorhexidine Itching and Rash    Aripiprazole Headache and Other (See Comments)     Insomnia, nightmares    Bacitracin Rash     Bactroban is effective; No difficulties    Erythromycin      intolerant.    Meperidine Hcl      intolerant only   Demerol    Chlorhexidine Gluconate Rash       Physical Exam:      ECOG: 3  KPS: 50    Vital Signs: /54 (BP Location: Left arm, Patient Position: Sitting, Cuff Size: Adult Regular)   Pulse 77   Resp 20   SpO2 94%   Physical Exam:    GENERAL: Sitting comfortably in chair and no distress  HEAD: Normocephalic, atraumatic  EYES: Eyes grossly normal to inspection.  No discharge or erythema, or obvious scleral/conjunctival abnormalities.  NOSE: No discharge, normal appearance   RESP: No audible wheeze, cough, or visible cyanosis.  No visible retractions or increased work of breathing.    SKIN: Visible skin clear. No significant rash, abnormal pigmentation or lesions.  NEURO: Cranial nerves grossly intact. Intermittently responds to questions with single words or short phrases.   EXTREMITIES: No obvious edema, normal appearing range of motion       Last CBC with Diff  WBC   Date Value Ref Range Status   07/09/2021 3.8 (L) 4.0 - 11.0 10e9/L Final     WBC Count   Date Value Ref Range Status   06/01/2024 3.1 (L) 4.0 - 11.0 10e3/uL Final     RBC Count   Date Value Ref Range Status   06/01/2024 3.86 3.80 - 5.20 10e6/uL Final   07/09/2021 3.52 (L) 3.8 - 5.2 10e12/L Final     Hemoglobin   Date Value Ref Range Status   06/01/2024 10.1 (L) 11.7 - 15.7 g/dL Final   07/09/2021 10.7 (L) 11.7 - 15.7 g/dL Final     Hematocrit    Date Value Ref Range Status   06/01/2024 33.3 (L) 35.0 - 47.0 % Final   07/09/2021 33.9 (L) 35.0 - 47.0 % Final     MCV   Date Value Ref Range Status   06/01/2024 86 78 - 100 fL Final   07/09/2021 96 78 - 100 fl Final     MCH   Date Value Ref Range Status   06/01/2024 26.2 (L) 26.5 - 33.0 pg Final   07/09/2021 30.4 26.5 - 33.0 pg Final     MCHC   Date Value Ref Range Status   06/01/2024 30.3 (L) 31.5 - 36.5 g/dL Final   07/09/2021 31.6 31.5 - 36.5 g/dL Final     RDW   Date Value Ref Range Status   06/01/2024 15.1 (H) 10.0 - 15.0 % Final   07/09/2021 14.7 10.0 - 15.0 % Final     Platelet Count   Date Value Ref Range Status   06/01/2024 255 150 - 450 10e3/uL Final   07/09/2021 200 150 - 450 10e9/L Final     Diff Method   Date Value Ref Range Status   07/09/2021 Automated Method  Final     % Neutrophils   Date Value Ref Range Status   06/01/2024 90 % Final   07/09/2021 60.9 % Final     % Lymphocytes   Date Value Ref Range Status   06/01/2024 7 % Final   07/09/2021 9.7 % Final     % Monocytes   Date Value Ref Range Status   06/01/2024 3 % Final   07/09/2021 15.7 % Final     % Eosinophils   Date Value Ref Range Status   06/01/2024 0 % Final   07/09/2021 12.9 % Final     % Basophils   Date Value Ref Range Status   06/01/2024 0 % Final   07/09/2021 0.3 % Final     Absolute Neutrophil   Date Value Ref Range Status   07/09/2021 2.3 1.6 - 8.3 10e9/L Final     Absolute Neutrophils   Date Value Ref Range Status   02/03/2022 2.6 1.6 - 8.3 10e3/uL Final     Absolute Lymphocytes   Date Value Ref Range Status   02/03/2022 0.2 (L) 0.8 - 5.3 10e3/uL Final   07/09/2021 0.4 (L) 0.8 - 5.3 10e9/L Final     Absolute Monocytes   Date Value Ref Range Status   02/03/2022 0.5 0.0 - 1.3 10e3/uL Final   07/09/2021 0.6 0.0 - 1.3 10e9/L Final        Last Comprehensive Metabolic Panel:  Sodium   Date Value Ref Range Status   06/01/2024 141 135 - 145 mmol/L Final     Comment:     Reference intervals for this test were updated on 09/26/2023 to  more accurately reflect our healthy population. There may be differences in the flagging of prior results with similar values performed with this method. Interpretation of those prior results can be made in the context of the updated reference intervals.    07/09/2021 139 133 - 144 mmol/L Final     Potassium   Date Value Ref Range Status   06/01/2024 4.4 3.4 - 5.3 mmol/L Final   01/13/2023 3.9 3.4 - 5.3 mmol/L Final   07/09/2021 3.7 3.4 - 5.3 mmol/L Final     Chloride   Date Value Ref Range Status   06/01/2024 102 98 - 107 mmol/L Final   01/13/2023 106 94 - 109 mmol/L Final   07/09/2021 109 94 - 109 mmol/L Final     Carbon Dioxide   Date Value Ref Range Status   07/09/2021 25 20 - 32 mmol/L Final     Carbon Dioxide (CO2)   Date Value Ref Range Status   06/01/2024 23 22 - 29 mmol/L Final   01/13/2023 28 20 - 32 mmol/L Final     Anion Gap   Date Value Ref Range Status   06/01/2024 16 (H) 7 - 15 mmol/L Final   01/13/2023 6 3 - 14 mmol/L Final   07/09/2021 5 3 - 14 mmol/L Final     Glucose   Date Value Ref Range Status   06/01/2024 134 (H) 70 - 99 mg/dL Final   01/13/2023 93 70 - 99 mg/dL Final   07/09/2021 92 70 - 99 mg/dL Final     GLUCOSE BY METER POCT   Date Value Ref Range Status   05/08/2024 92 70 - 99 mg/dL Final     Urea Nitrogen   Date Value Ref Range Status   06/01/2024 18.6 8.0 - 23.0 mg/dL Final   01/13/2023 16 7 - 30 mg/dL Final   07/09/2021 10 7 - 30 mg/dL Final     Creatinine   Date Value Ref Range Status   06/01/2024 0.75 0.51 - 0.95 mg/dL Final   07/09/2021 0.55 0.52 - 1.04 mg/dL Final     GFR Estimate   Date Value Ref Range Status   06/01/2024 81 >60 mL/min/1.73m2 Final   07/09/2021 >90 >60 mL/min/[1.73_m2] Final     Comment:     Non  GFR Calc  Starting 12/18/2018, serum creatinine based estimated GFR (eGFR) will be   calculated using the Chronic Kidney Disease Epidemiology Collaboration   (CKD-EPI) equation.       Calcium   Date Value Ref Range Status   06/01/2024 9.7 8.8 - 10.2 mg/dL  Final   07/09/2021 9.1 8.5 - 10.1 mg/dL Final        Imaging and Diagnostic Studies:   See HPI above    Assessment/Plan:  Ms. 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 She was treated with radiotherapy alone to a total dose of 3500 cGy in 10 fx completed 10/25/23. She has not received additional cancer-directed therapy.     1)  We discussed although the final read from radiology has not returned, it is concerning for progression of disease based on our review. We will call with the final results. Olga is meeting with Dr. Baker on Tuesday and we will discuss her case at [facility] tumor board next week.     Luís Nevarez MD  PGY-4 Radiation Oncology  Department of Radiation Oncology  [facility]  Phone: [phone]    A medical resident participated in the care of this patient and in the preparation of this note. I have verified and edited this note. I personally performed key elements of the physical exam and medical decision making for this patient.  I agree with the assessment and plan of care as documented in the note above.    The overall time I spent on direct patient care including self review of records, labs, and radiographic studies, as well as, direct face-to-face interaction with the patient and coordination of care with other providers was 25 minutes on the day of the encounter.       Betsy Spann MD, PhD     Department of Radiation Oncology  [facility]

## 2024-08-10 ENCOUNTER — HEALTH MAINTENANCE LETTER (OUTPATIENT)
Age: 79
End: 2024-08-10

## 2024-08-12 ENCOUNTER — TUMOR CONFERENCE (OUTPATIENT)
Dept: ONCOLOGY | Facility: CLINIC | Age: 79
End: 2024-08-12
Payer: MEDICARE

## 2024-08-12 NOTE — PROGRESS NOTES
NEURO-ONCOLOGY VISIT  Aug 13, 2024    CHIEF COMPLAINT: Ms. Olga Bailey is a 78 year old right-handed woman with a left frontoparietal glioblastoma (IDH wild-type, MGMT promoter methylated), diagnosed following gross total resection on 2/23/2021. Initiation of upfront cancer-directed treatment was delayed due to a complicated hospitalization. Given a resolving infection and lowered functional status, radiation alone was initiated on 5/4/2021 and completed on 5/27/2021.     Olga started adjuvant therapy with 150mg/m2 dosing of temozolomide, however, cycle 1 was complicated by significant hematologic toxicity. Dosing was changed to metronomic/ daily dosing in 7/2021 and this was well tolerated. Imaging from 8/2021 and 11/2021 demonstrates positive treatment effect.    Chemotherapy was placed on a hold in 10/2021 in the setting of severe constipation, but then, resumed. She has since completed the planned 6 months of adjuvant temozolomide as of 12/2021.    Imaging since 2/2022 has been without concern for cancer recurrence, however, imaging in 7/2022 showed a punctate area of contrast enhancement of indeterminate significance. Repeat imaging in 10/2022 showed near resolution of that punctate lesion and no evidence of cancer recurrence. Imaging in 1/2023 through 4/2023 has been without concern.     However, imaging in 8/2023 demonstrated changes of unclear significance that on repeat imaging in 9/2023 showed clear evidence of cancer recurrence. She underwent a repeat course of radiation therapy in October with the addition of bevacizumab (polina). Imaging in 10/2023 was largely stable. In the setting of a prolonged hospitalization in 11/2023 with no clinical improvement with the continuation of polina, polina was discontinued.     Repeat imaging in 12/2023 demonstrated a positive treatment effect with no new evidence of cancer progression. However, Olga's clinic status reflected a very poor functional status and my  "recommendation was to transition to hospice for the additional support that she now required.     Imaging was again repeated in 8/2024 and demonstrated cancer progression. Olga and Fahad are wanting to pursue radiotherapy and I offered re-initiation of polina to improve tolerance.     Olga is presenting in follow-up accompanied by Fahad ().    HISTORY OF PRESENT ILLNESS  -Olga states today that she is \"good\".  -Fahad endorses that last week she took 4 steps without assistance.    Hoping to transition away from the [facility] [facility] to stand assist.    Able to turn in her bed.    Able to lift 5 pounds on the left side and 2.5 pounds on the right side.   -Off the Vimpat. Noted focal motor seizures; facial twitching.     Started on Zonisamide with no recurrent seizures after dose increase and there is limited associated fatigue.    Also on Keppra.   -Eating well.      MEDICATIONS   Current Outpatient Medications   Medication Sig Dispense Refill    acetaminophen (TYLENOL) 500 MG tablet Take 500-1,000 mg by mouth every 4 hours as needed for mild pain      amLODIPine (NORVASC) 5 MG tablet Take 1 tablet (5 mg) by mouth daily      FLUoxetine (PROZAC) 20 MG capsule Take 20 mg by mouth daily      guaiFENesin (MUCINEX) 600 MG 12 hr tablet Take 600 mg by mouth 2 times daily      ipratropium - albuterol 0.5 mg/2.5 mg/3 mL (DUONEB) 0.5-2.5 (3) MG/3ML neb solution Take 1 vial (3 mLs) by nebulization every 6 hours as needed for shortness of breath, wheezing or cough 90 mL 6    levETIRAcetam (KEPPRA) 750 MG tablet Take 750 mg by mouth 2 times daily      loperamide (IMODIUM) 2 MG capsule Take 4 mg by mouth as needed for diarrhea Then 1 capsule after consecutive stools.      LORazepam (ATIVAN) 0.5 MG tablet Take 0.5 mg by mouth 2 times daily as needed for seizures More than 10 minutes between each doses      multivitamin, therapeutic (THERA-VIT) TABS tablet Take 1 tablet by mouth daily      ondansetron (ZOFRAN) 4 MG tablet " Take 1 tablet (4 mg) by mouth every 8 hours as needed for nausea 30 tablet 3    pantoprazole (PROTONIX) 40 MG EC tablet Take 1 tablet (40 mg) by mouth 2 times daily (before meals) 30 tablet 0    polyethylene glycol (MIRALAX) 17 GM/Dose powder Take 17 g by mouth 2 times daily as needed for constipation 510 g 0    pramox-pe-glycerin-petrolatum (PREPARATION H) 1-0.25-14.4-15 % CREA cream Place rectally 3 times daily as needed for hemorrhoids 51 g 0    predniSONE (DELTASONE) 10 MG tablet Take 3 tablets (30 mg) by mouth daily 18 tablet 0    rivaroxaban ANTICOAGULANT (XARELTO ANTICOAGULANT) 20 MG TABS tablet Take 1 tablet (20 mg) by mouth daily (with dinner) 30 tablet 3    senna-docusate (SENOKOT-S/PERICOLACE) 8.6-50 MG tablet Take 1 tablet by mouth daily as needed for constipation      sodium chloride (OCEAN) 0.65 % nasal spray Spray 1 spray into both nostrils every hour as needed for congestion 104 mL 0    zonisamide (ZONEGRAN) 50 MG capsule Take 50 mg by mouth 2 times daily      ipratropium - albuterol 0.5 mg/2.5 mg/3 mL (DUONEB) 0.5-2.5 (3) MG/3ML neb solution Take 1 vial (3 mLs) by nebulization every 6 hours (Patient not taking: Reported on 8/13/2024) 90 mL 6     No current facility-administered medications for this visit.     Current Outpatient Medications   Medication Sig Dispense Refill    acetaminophen (TYLENOL) 500 MG tablet Take 500-1,000 mg by mouth every 4 hours as needed for mild pain      amLODIPine (NORVASC) 5 MG tablet Take 1 tablet (5 mg) by mouth daily      FLUoxetine (PROZAC) 20 MG capsule Take 20 mg by mouth daily      guaiFENesin (MUCINEX) 600 MG 12 hr tablet Take 600 mg by mouth 2 times daily      ipratropium - albuterol 0.5 mg/2.5 mg/3 mL (DUONEB) 0.5-2.5 (3) MG/3ML neb solution Take 1 vial (3 mLs) by nebulization every 6 hours as needed for shortness of breath, wheezing or cough 90 mL 6    levETIRAcetam (KEPPRA) 750 MG tablet Take 750 mg by mouth 2 times daily      loperamide (IMODIUM) 2 MG  capsule Take 4 mg by mouth as needed for diarrhea Then 1 capsule after consecutive stools.      LORazepam (ATIVAN) 0.5 MG tablet Take 0.5 mg by mouth 2 times daily as needed for seizures More than 10 minutes between each doses      multivitamin, therapeutic (THERA-VIT) TABS tablet Take 1 tablet by mouth daily      ondansetron (ZOFRAN) 4 MG tablet Take 1 tablet (4 mg) by mouth every 8 hours as needed for nausea 30 tablet 3    pantoprazole (PROTONIX) 40 MG EC tablet Take 1 tablet (40 mg) by mouth 2 times daily (before meals) 30 tablet 0    polyethylene glycol (MIRALAX) 17 GM/Dose powder Take 17 g by mouth 2 times daily as needed for constipation 510 g 0    pramox-pe-glycerin-petrolatum (PREPARATION H) 1-0.25-14.4-15 % CREA cream Place rectally 3 times daily as needed for hemorrhoids 51 g 0    predniSONE (DELTASONE) 10 MG tablet Take 3 tablets (30 mg) by mouth daily 18 tablet 0    rivaroxaban ANTICOAGULANT (XARELTO ANTICOAGULANT) 20 MG TABS tablet Take 1 tablet (20 mg) by mouth daily (with dinner) 30 tablet 3    senna-docusate (SENOKOT-S/PERICOLACE) 8.6-50 MG tablet Take 1 tablet by mouth daily as needed for constipation      sodium chloride (OCEAN) 0.65 % nasal spray Spray 1 spray into both nostrils every hour as needed for congestion 104 mL 0    zonisamide (ZONEGRAN) 50 MG capsule Take 50 mg by mouth 2 times daily      ipratropium - albuterol 0.5 mg/2.5 mg/3 mL (DUONEB) 0.5-2.5 (3) MG/3ML neb solution Take 1 vial (3 mLs) by nebulization every 6 hours (Patient not taking: Reported on 8/13/2024) 90 mL 6     DRUG ALLERGIES   Allergies   Allergen Reactions    Pilocarpine Headache and Nausea and Vomiting    Chlorhexidine Itching and Rash    Aripiprazole Headache and Other (See Comments)     Insomnia, nightmares    Bacitracin Rash     Bactroban is effective; No difficulties    Erythromycin      intolerant.    Meperidine Hcl      intolerant only   Demerol    Chlorhexidine Gluconate Rash       IMMUNIZATIONS   Immunization  History   Administered Date(s) Administered    COVID-19 Bivalent 18+ (Moderna) 12/13/2022    COVID-19 Monovalent 18+ (Moderna) 03/18/2021, 07/22/2021, 01/18/2022    Flu 65+ Years 09/28/2020    HepB 01/01/1990    Influenza (IIV3) PF 01/01/2001    Influenza Vaccine 65+ (Fluzone HD) 09/28/2020    Influenza Vaccine >6 months,quad, PF 09/28/2020    Pneumo Conj 13-V (2010&after) 10/29/2014    Pneumococcal 23 valent 07/22/2020    TDAP Vaccine (Boostrix) 08/22/2016    Tetanus 06/13/2004    Zoster recombinant adjuvanted (SHINGRIX) 12/24/2019, 10/12/2020    Zoster vaccine, live 12/18/2008       ONCOLOGIC HISTORY  -2/2021 PRESENTATION: Progressive aphasia.   -2/19/2021 MR brain imaging with 3.3 x 2.8 x 2.8 cm enhancing mass in left frontal-parietal region. A second contrast enhancing lesion (0.5 x 1.0 x 1.0 cm) in right occipital lobe which is dural based and largely stable in size since 9/2011; likely representing a meningioma.  -2/23/2021 SURGERY: Gross total resection by Dr. Cummings  PATHOLOGY: Glioblastoma; IDH1-R132H wild-type/ IDH 1 and 2 wildtype, MGMT promoter methylated. Not BRAF mutated.   -3/23/2021 NEURO-ONC: Recommending chemoradiotherapy.   -3/2021 ADMISSION: SOB and chest pain; CT PA negative, but bilateral DVT noted on U/S. Started on Lovenox. Acute hypoxic respiratory failure in the setting of bilateral pneumonia; presumed PJP, concern for transfusion-related acute lung injury and right hemidiaphragm paralysis. Seizure. Right retroperitoneal hematoma. Ileus. Non-severe malnutrition on TPN with concern for refeeding syndrome. 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 Shock Liver.  -5/4 - 5/27/2021 RADS: 15 fractions.   -5/25/2021 NEURO-ONC/ DEVICE: Discussed the role of Optune; to be considered. Repeat MRI in 4 weeks with plans to start adjuvant temozolomide.   -6/22/2021 NEURO-ONC/ MRB/ CHEMO: Clinically improving. Imaging largely stable. Starting adjuvant temozolomide 150mg/m2 (250mg), cycle 1 (start date 6/24).    -7/20/2021 NEURO-ONC/ CHEMO: Clinically improving. Thrombocytopenia noted with adjuvant temozolomide dosing, platelets of 26K; will transition to metronomic dosing 50mg/m2 (100mg) daily to start once platelets are > 80K.    -8/17/2021 NEURO-ONC/ MRB/ CHEMO: Clinically improving. Saw Dr. Gamboa, who recommended starting anticoagulation; on Eliquis. Imaging with positive treatment response. Continue metronomic/ daily dosing at 50mg/m2 (100mg) through 12/2021.    -9/14/2021 NEURO-ONC/ CHEMO: Clinically improving. Continue metronomic/ daily dosing at 50mg/m2 (100mg) through 12/2021.    -10/12/2021 NEURO-ONC/ CHEMO: Clinically improving. Holding temozolomide and Zofran in the setting of severe constipation. Abdominal x-ray with high stool burden; consulted Dr. Hodge for management of constipation given history of ileus.   -10/28/2021 CHEMO: Temozolomide restarted.   -11/16/2021 NEURO-ONC/ MRB/ CHEMO: Clinically improving Less constipation, continued low appetite; referral to palliative care for mental health management. Referral to Dr. Agudelo to optimize rehab. Imaging with continued positive treatment response. Continue daily temozolomide.   -12/21/2021 NEURO-ONC/ CHEMO: Clinically improving in terms of appetite, energy. Stopping daily temozolomide at the end of the month. Discussion with Dr. Gamboa regarding removal of IVC filter.   -2/14/2022 MRB with a stable postoperative changes of left parietal craniotomy and tumor resection.   -3/1/2022 NEURO-ONC: Clinically deconditioned, mood is depressed; following with Cancer PM&R and Palliative Care. With imaging stable, continue imaging surveillance.   -4/4/2022 MRB with stable postoperative change of left-sided craniotomy and tumor resection.  -7/19/2022 NEURO-ONC/ MRB: Clinically improving. Imaging with no overt evidence of cancer recurrence, new punctate area of contrast enhancement of indeterminate significance; continue imaging surveillance.   -10/4/2022 NEURO-ONC/ MRB:  Clinically with improved cognition. Recommend stopping ritalin in light of no known benefit and worsening tremor. Reestablish care with Dr. Agudelo. Imaging with no overt evidence of cancer recurrence, the punctate area of contrast enhancement nearly resolved; continue imaging surveillance.   -1/17/2023 NEURO-ONC/ MRB: Stable neurological deficits; recommending neuropsych evaluation to objectively assess cognition and capacity. Imaging with no overt evidence of cancer recurrence.  -4/18/2023 NEURO-ONC/ MRB: Decrease in Hb. Completed neuro-psych evaluation; diagnosed as having dementia and is not capable of independent decision-making. Imaging with no new concerns.   -8/22/2023 NEURO-ONC/ MRB: No new neurological concerns. Imaging with changes of unclear significance; repeat in 1 month.   -9/26/2023 NEURO-ONC/ MRB: No new neurological concerns. Imaging with evidence of cancer recurrence. Referral to radiation oncology for a discussion about repeat radiation. Remaining focused on maintaining a good quality of life.    -10/2023 RADS: 35cGy in 10 fractions.  -10/2023 CHEMO: Started bevacizumab (polina).  -10/2023 MR brain imaging with largely stable findings.   -12/19/2023 NEURO-ONC/ MRB: Very poor functional status. Imaging with no new concerns. Decreasing Vimpat dosing to 50mg BID from 100mg BID. Recommendation for transitioning to hospice; referral placed.    -6/2024 MR brain imaging with increased patch peripheral enhancement of the left frontoparietal resection cavity.  -12/19/2023 NEURO-ONC/ MRB: Very poor functional status. Imaging with cancer progression. Petey are wanting to pursue radiotherapy and I offered re-initiation of polina to improve tolerance.       PHYSICAL EXAMINATION  /77 (BP Location: Left arm, Patient Position: Sitting, Cuff Size: Adult Regular)   Pulse 70   Temp 98.7  F (37.1  C) (Oral)   Resp 16   SpO2 96%   Wt Readings from Last 2 Encounters:   06/01/24 76.4 kg (168 lb 8 oz)  "  05/06/24 82.9 kg (182 lb 12.2 oz)      Ht Readings from Last 2 Encounters:   06/27/24 1.676 m (5' 6\")   10/18/23 1.702 m (5' 7\")     KPS: 40    Alert.   Limited verbal output, one word answers, significant delay in response.  Limited to no spontaneous movement of the right arm/ hand.   In a wheelchair.       MEDICAL RECORDS  Personally reviewed; Radiation oncology clinic note with Dr. Spann last week; Reviewed imaging.    LABS  Personally reviewed all available lab results; Vimpat level of 7.2 and procalcitonin of 0.1 in June.     IMAGING  Personally reviewed MR brain imaging from yesterday and compared to prior imaging.    Formal read; \"Marked progression of enhancement in the left parietal lobe with increased surrounding edema. Increased mass effect in the left cerebral hemisphere and increased cerebral blood volume in the enhancing component. Overall constellation of findings are suggestive of local tumor progression. Involvement of inferior left precentral and postcentral gyrus, supramarginal gyrus and angular gyrus, may cause conductive type aphasia.\"    Imaging was shown to and results were reviewed with Olga and Fahad.       IMPRESSION  On date of service, 55 minutes was spent in clinic and 12 minutes was spent preparing for the visit through extensive chart review and coordinating care for this high complexity visit. The following is in explanation for the recommendations used to define the plan.       Olga continues to demonstrate a poor physical and cognitive performance status despite rehab efforts. While Fahad voiced today that he has observed overall improvements, her functional ability remains low. In addition, Olga's two most recent imaging has demonstrated marked radiographic evidence of cancer progression.     With regard to chemotherapy-based cancer-directed therapy, oral temozolomide monotherapy is not enough to stop this cancer progression. In the setting of no efficacy, Olga " would only stand to inappropriately take on all the side effects of treatment with no benefit. Given the large degree of intracranial cancer burden, we discussed how a more powerful tool, being radiation, would be necessary at this juncture. Olga and Fahad met with Dr. Spann and it was Fahad's understanding that repeat radiotherapy was an option and the recommended next step in therapy. I communicated this with Dr. Spann. In the setting of a repeat course of radiotherapy, I offered re-initiation of bevacizumab (polina) to improve tolerance.    If radiation was not to be utilized, there would be no role for polina as Fahad has observed that Olga is seeing clinical improvement despite cancer progression seen on imaging. As a result, the radiographic evidence of mass effect and edema are not translating to a decline in her functional status and thus, the role of polina, being to reduce symptom burden caused by mass effect and edema, would have no place in her treatment plan.     Based on Olga's examination again today in clinic and the global cerebral atrophy seen on imaging, it is clear that Olga has dementia. Consequently, her medical decision maker is her , Fahad. As I confirmed today, Fahad remains acutely aware that Olga has a type of brain cancer for which there is currently no cure. Therefore, I again stressed the need for maintaining a good quality of life. I voiced that I had significant concerns surrounding her tolerance for any additional cancer-directed treatment and how the known and anticipated associated fatigue, intracranial swelling, etc would further diminish her ability to interact with others, reverse all the functional improvement she has seen, and greatly reduce her quality of life. Fahad is wanting to proceed with treatment.     PROBLEM LIST  Glioblastoma  Aphasia  Apraxia  Lacks capacity, dementia  Gait instability  Fatigue    PLAN  -CANCER DIRECTED THERAPY-  -As above;  Return referral to radiation oncology.      -STEROIDS-  -Off dexamethasone.    -SEIZURE MANAGEMENT-  -Given history of seizures, antiepileptics are indicated.    Continue Keppra and Zonisamide.   Did not tolerate Vimpat.     -DVT-  -On Xarelto.   -Requires lifelong anticoagulation given cancer diagnosis.   -IVC filter in place.     Return to clinic pending start of radiotherapy.     Stephanie Baker MD  Neuro-oncology

## 2024-08-13 ENCOUNTER — PATIENT OUTREACH (OUTPATIENT)
Dept: ONCOLOGY | Facility: CLINIC | Age: 79
End: 2024-08-13
Payer: MEDICARE

## 2024-08-13 ENCOUNTER — ONCOLOGY VISIT (OUTPATIENT)
Dept: ONCOLOGY | Facility: CLINIC | Age: 79
End: 2024-08-13
Attending: PSYCHIATRY & NEUROLOGY
Payer: MEDICARE

## 2024-08-13 VITALS
SYSTOLIC BLOOD PRESSURE: 115 MMHG | HEART RATE: 70 BPM | RESPIRATION RATE: 16 BRPM | OXYGEN SATURATION: 96 % | DIASTOLIC BLOOD PRESSURE: 77 MMHG | TEMPERATURE: 98.7 F

## 2024-08-13 DIAGNOSIS — C71.9 GLIOBLASTOMA (H): Primary | ICD-10-CM

## 2024-08-13 PROCEDURE — G0463 HOSPITAL OUTPT CLINIC VISIT: HCPCS | Performed by: PSYCHIATRY & NEUROLOGY

## 2024-08-13 PROCEDURE — 99215 OFFICE O/P EST HI 40 MIN: CPT | Performed by: PSYCHIATRY & NEUROLOGY

## 2024-08-13 PROCEDURE — 99417 PROLNG OP E/M EACH 15 MIN: CPT | Performed by: PSYCHIATRY & NEUROLOGY

## 2024-08-13 ASSESSMENT — PAIN SCALES - GENERAL: PAINLEVEL: NO PAIN (0)

## 2024-08-13 NOTE — PROGRESS NOTES
[facility]: Cancer Care Plan of Care Education Note                                    Discussion with Patient:                                                           Education concerns:kathy        Assessment:                                                      Assessment completed with:: Spouse or significant other    Plan of Care Education   Yearly learning assessment completed?: Yes (see Education tab)  Diagnosis:: GBM with progression  Does patient understand diagnosis?: Yes  Tx plan/regimen:: kathy and radiation  Does patient understand treatment plan/regimen?: No (spouse understands)  Preparing for treatment:: Reviewed treatment preparation information with patient (vascular access, day of chemo, visitor policy, what to bring, etc.)  Vascular access education provided for:: Peripheral IV  Side effect education:: Diarrhea/Constipation;Fatigue;Lab value monitoring (anemia, neutropenia, thrombocytopenia)  Supportive services education provided for:: Home infusion  Safety/self care at home reviewed with patient:: Yes  Coping - concerns/fears reviewed with patient:: No  Plan of Care:: Treatment schedule  When to call provider:: Bleeding;Increased shortness of breath;New/worsening pain;Shaking chills;Temperature >100.4F;Uncontrolled diarrhea/constipation;Uncontrolled nausea/vomiting  Reasons for deferring treatment reviewed with patient:: Yes  Additional education provided for: : Bleeding precautions    Evaluation of Learning  Patient Education Provided: Yes  Readiness:: Acceptance  Method:: Booklet/Handout;Explanation  Response:: Demonstrates understanding    No assessment indicated    Intervention/Education provided during outreach:                                                       Kathy and radiation treatment   Fahad would like bindu to have kathy in the facility. I will inquire about this to the appropriate teams    Patient to follow up as scheduled at next appt  Patient to call/MyChart message with  updates  Confirmed patient has clinic and triage numbers    Nafisa Vee, RN, BSN  Specialty Care Coordinator  [facility]  [phone]

## 2024-08-13 NOTE — LETTER
8/13/2024      [place] [address] [facility] [address]      Dear Colleague,    Thank you for referring your patient, Olga Bailey, to the [facility]. Please see a copy of my visit note below.    NEURO-ONCOLOGY VISIT  Aug 13, 2024    CHIEF COMPLAINT: Ms. Olga Bailey is a 78 year old right-handed woman with a left frontoparietal glioblastoma (IDH wild-type, MGMT promoter methylated), diagnosed following gross total resection on 2/23/2021. Initiation of upfront cancer-directed treatment was delayed due to a complicated hospitalization. Given a resolving infection and lowered functional status, radiation alone was initiated on 5/4/2021 and completed on 5/27/2021.     Olga started adjuvant therapy with 150mg/m2 dosing of temozolomide, however, cycle 1 was complicated by significant hematologic toxicity. Dosing was changed to metronomic/ daily dosing in 7/2021 and this was well tolerated. Imaging from 8/2021 and 11/2021 demonstrates positive treatment effect.    Chemotherapy was placed on a hold in 10/2021 in the setting of severe constipation, but then, resumed. She has since completed the planned 6 months of adjuvant temozolomide as of 12/2021.    Imaging since 2/2022 has been without concern for cancer recurrence, however, imaging in 7/2022 showed a punctate area of contrast enhancement of indeterminate significance. Repeat imaging in 10/2022 showed near resolution of that punctate lesion and no evidence of cancer recurrence. Imaging in 1/2023 through 4/2023 has been without concern.     However, imaging in 8/2023 demonstrated changes of unclear significance that on repeat imaging in 9/2023 showed clear evidence of cancer recurrence. She underwent a repeat course of radiation therapy in October with the addition of bevacizumab (polina). Imaging in 10/2023 was largely stable. In the setting of a prolonged hospitalization in 11/2023 with no  "clinical improvement with the continuation of polina, polina was discontinued.     Repeat imaging in 12/2023 demonstrated a positive treatment effect with no new evidence of cancer progression. However, Olga's clinic status reflected a very poor functional status and my recommendation was to transition to hospice for the additional support that she now required.     Imaging was again repeated in 8/2024 and demonstrated cancer progression. Olga and Fahad are wanting to pursue radiotherapy and I offered re-initiation of polina to improve tolerance.     Olga is presenting in follow-up accompanied by Fahad ().    HISTORY OF PRESENT ILLNESS  -Olga states today that she is \"good\".  -Fahad endorses that last week she took 4 steps without assistance.    Hoping to transition away from the [employer] life to stand assist.    Able to turn in her bed.    Able to lift 5 pounds on the left side and 2.5 pounds on the right side.   -Off the Vimpat. Noted focal motor seizures; facial twitching.     Started on Zonisamide with no recurrent seizures after dose increase and there is limited associated fatigue.    Also on Keppra.   -Eating well.      MEDICATIONS   Current Outpatient Medications   Medication Sig Dispense Refill     acetaminophen (TYLENOL) 500 MG tablet Take 500-1,000 mg by mouth every 4 hours as needed for mild pain       amLODIPine (NORVASC) 5 MG tablet Take 1 tablet (5 mg) by mouth daily       FLUoxetine (PROZAC) 20 MG capsule Take 20 mg by mouth daily       guaiFENesin (MUCINEX) 600 MG 12 hr tablet Take 600 mg by mouth 2 times daily       ipratropium - albuterol 0.5 mg/2.5 mg/3 mL (DUONEB) 0.5-2.5 (3) MG/3ML neb solution Take 1 vial (3 mLs) by nebulization every 6 hours as needed for shortness of breath, wheezing or cough 90 mL 6     levETIRAcetam (KEPPRA) 750 MG tablet Take 750 mg by mouth 2 times daily       loperamide (IMODIUM) 2 MG capsule Take 4 mg by mouth as needed for diarrhea Then 1 capsule after " consecutive stools.       LORazepam (ATIVAN) 0.5 MG tablet Take 0.5 mg by mouth 2 times daily as needed for seizures More than 10 minutes between each doses       multivitamin, therapeutic (THERA-VIT) TABS tablet Take 1 tablet by mouth daily       ondansetron (ZOFRAN) 4 MG tablet Take 1 tablet (4 mg) by mouth every 8 hours as needed for nausea 30 tablet 3     pantoprazole (PROTONIX) 40 MG EC tablet Take 1 tablet (40 mg) by mouth 2 times daily (before meals) 30 tablet 0     polyethylene glycol (MIRALAX) 17 GM/Dose powder Take 17 g by mouth 2 times daily as needed for constipation 510 g 0     pramox-pe-glycerin-petrolatum (PREPARATION H) 1-0.25-14.4-15 % CREA cream Place rectally 3 times daily as needed for hemorrhoids 51 g 0     predniSONE (DELTASONE) 10 MG tablet Take 3 tablets (30 mg) by mouth daily 18 tablet 0     rivaroxaban ANTICOAGULANT (XARELTO ANTICOAGULANT) 20 MG TABS tablet Take 1 tablet (20 mg) by mouth daily (with dinner) 30 tablet 3     senna-docusate (SENOKOT-S/PERICOLACE) 8.6-50 MG tablet Take 1 tablet by mouth daily as needed for constipation       sodium chloride (OCEAN) 0.65 % nasal spray Spray 1 spray into both nostrils every hour as needed for congestion 104 mL 0     zonisamide (ZONEGRAN) 50 MG capsule Take 50 mg by mouth 2 times daily       ipratropium - albuterol 0.5 mg/2.5 mg/3 mL (DUONEB) 0.5-2.5 (3) MG/3ML neb solution Take 1 vial (3 mLs) by nebulization every 6 hours (Patient not taking: Reported on 8/13/2024) 90 mL 6     No current facility-administered medications for this visit.     Current Outpatient Medications   Medication Sig Dispense Refill     acetaminophen (TYLENOL) 500 MG tablet Take 500-1,000 mg by mouth every 4 hours as needed for mild pain       amLODIPine (NORVASC) 5 MG tablet Take 1 tablet (5 mg) by mouth daily       FLUoxetine (PROZAC) 20 MG capsule Take 20 mg by mouth daily       guaiFENesin (MUCINEX) 600 MG 12 hr tablet Take 600 mg by mouth 2 times daily       ipratropium  - albuterol 0.5 mg/2.5 mg/3 mL (DUONEB) 0.5-2.5 (3) MG/3ML neb solution Take 1 vial (3 mLs) by nebulization every 6 hours as needed for shortness of breath, wheezing or cough 90 mL 6     levETIRAcetam (KEPPRA) 750 MG tablet Take 750 mg by mouth 2 times daily       loperamide (IMODIUM) 2 MG capsule Take 4 mg by mouth as needed for diarrhea Then 1 capsule after consecutive stools.       LORazepam (ATIVAN) 0.5 MG tablet Take 0.5 mg by mouth 2 times daily as needed for seizures More than 10 minutes between each doses       multivitamin, therapeutic (THERA-VIT) TABS tablet Take 1 tablet by mouth daily       ondansetron (ZOFRAN) 4 MG tablet Take 1 tablet (4 mg) by mouth every 8 hours as needed for nausea 30 tablet 3     pantoprazole (PROTONIX) 40 MG EC tablet Take 1 tablet (40 mg) by mouth 2 times daily (before meals) 30 tablet 0     polyethylene glycol (MIRALAX) 17 GM/Dose powder Take 17 g by mouth 2 times daily as needed for constipation 510 g 0     pramox-pe-glycerin-petrolatum (PREPARATION H) 1-0.25-14.4-15 % CREA cream Place rectally 3 times daily as needed for hemorrhoids 51 g 0     predniSONE (DELTASONE) 10 MG tablet Take 3 tablets (30 mg) by mouth daily 18 tablet 0     rivaroxaban ANTICOAGULANT (XARELTO ANTICOAGULANT) 20 MG TABS tablet Take 1 tablet (20 mg) by mouth daily (with dinner) 30 tablet 3     senna-docusate (SENOKOT-S/PERICOLACE) 8.6-50 MG tablet Take 1 tablet by mouth daily as needed for constipation       sodium chloride (OCEAN) 0.65 % nasal spray Spray 1 spray into both nostrils every hour as needed for congestion 104 mL 0     zonisamide (ZONEGRAN) 50 MG capsule Take 50 mg by mouth 2 times daily       ipratropium - albuterol 0.5 mg/2.5 mg/3 mL (DUONEB) 0.5-2.5 (3) MG/3ML neb solution Take 1 vial (3 mLs) by nebulization every 6 hours (Patient not taking: Reported on 8/13/2024) 90 mL 6     DRUG ALLERGIES   Allergies   Allergen Reactions     Pilocarpine Headache and Nausea and Vomiting     Chlorhexidine  Itching and Rash     Aripiprazole Headache and Other (See Comments)     Insomnia, nightmares     Bacitracin Rash     Bactroban is effective; No difficulties     Erythromycin      intolerant.     Meperidine Hcl      intolerant only   Demerol     Chlorhexidine Gluconate Rash       IMMUNIZATIONS   Immunization History   Administered Date(s) Administered     COVID-19 Bivalent 18+ (Moderna) 12/13/2022     COVID-19 Monovalent 18+ (Moderna) 03/18/2021, 07/22/2021, 01/18/2022     Flu 65+ Years 09/28/2020     HepB 01/01/1990     Influenza (IIV3) PF 01/01/2001     Influenza Vaccine 65+ (Fluzone HD) 09/28/2020     Influenza Vaccine >6 months,quad, PF 09/28/2020     Pneumo Conj 13-V (2010&after) 10/29/2014     Pneumococcal 23 valent 07/22/2020     TDAP Vaccine (Boostrix) 08/22/2016     Tetanus 06/13/2004     Zoster recombinant adjuvanted (SHINGRIX) 12/24/2019, 10/12/2020     Zoster vaccine, live 12/18/2008       ONCOLOGIC HISTORY  -2/2021 PRESENTATION: Progressive aphasia.   -2/19/2021 MR brain imaging with 3.3 x 2.8 x 2.8 cm enhancing mass in left frontal-parietal region. A second contrast enhancing lesion (0.5 x 1.0 x 1.0 cm) in right occipital lobe which is dural based and largely stable in size since 9/2011; likely representing a meningioma.  -2/23/2021 SURGERY: Gross total resection by Dr. Cummings  PATHOLOGY: Glioblastoma; IDH1-R132H wild-type/ IDH 1 and 2 wildtype, MGMT promoter methylated. Not BRAF mutated.   -3/23/2021 NEURO-ONC: Recommending chemoradiotherapy.   -3/2021 ADMISSION: SOB and chest pain; CT PA negative, but bilateral DVT noted on U/S. Started on Lovenox. Acute hypoxic respiratory failure in the setting of bilateral pneumonia; presumed PJP, concern for transfusion-related acute lung injury and right hemidiaphragm paralysis. Seizure. Right retroperitoneal hematoma. Ileus. Non-severe malnutrition on TPN with concern for refeeding syndrome. 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 Shock Liver.  -5/4 - 5/27/2021  RADS: 15 fractions.   -5/25/2021 NEURO-ONC/ DEVICE: Discussed the role of Optune; to be considered. Repeat MRI in 4 weeks with plans to start adjuvant temozolomide.   -6/22/2021 NEURO-ONC/ MRB/ CHEMO: Clinically improving. Imaging largely stable. Starting adjuvant temozolomide 150mg/m2 (250mg), cycle 1 (start date 6/24).   -7/20/2021 NEURO-ONC/ CHEMO: Clinically improving. Thrombocytopenia noted with adjuvant temozolomide dosing, platelets of 26K; will transition to metronomic dosing 50mg/m2 (100mg) daily to start once platelets are > 80K.    -8/17/2021 NEURO-ONC/ MRB/ CHEMO: Clinically improving. Saw Dr. Gamboa, who recommended starting anticoagulation; on Eliquis. Imaging with positive treatment response. Continue metronomic/ daily dosing at 50mg/m2 (100mg) through 12/2021.    -9/14/2021 NEURO-ONC/ CHEMO: Clinically improving. Continue metronomic/ daily dosing at 50mg/m2 (100mg) through 12/2021.    -10/12/2021 NEURO-ONC/ CHEMO: Clinically improving. Holding temozolomide and Zofran in the setting of severe constipation. Abdominal x-ray with high stool burden; consulted Dr. Hodge for management of constipation given history of ileus.   -10/28/2021 CHEMO: Temozolomide restarted.   -11/16/2021 NEURO-ONC/ MRB/ CHEMO: Clinically improving Less constipation, continued low appetite; referral to palliative care for mental health management. Referral to Dr. Agudelo to optimize rehab. Imaging with continued positive treatment response. Continue daily temozolomide.   -12/21/2021 NEURO-ONC/ CHEMO: Clinically improving in terms of appetite, energy. Stopping daily temozolomide at the end of the month. Discussion with Dr. Gamboa regarding removal of IVC filter.   -2/14/2022 MRB with a stable postoperative changes of left parietal craniotomy and tumor resection.   -3/1/2022 NEURO-ONC: Clinically deconditioned, mood is depressed; following with Cancer PM&R and Palliative Care. With imaging stable, continue imaging surveillance.    -4/4/2022 MRB with stable postoperative change of left-sided craniotomy and tumor resection.  -7/19/2022 NEURO-ONC/ MRB: Clinically improving. Imaging with no overt evidence of cancer recurrence, new punctate area of contrast enhancement of indeterminate significance; continue imaging surveillance.   -10/4/2022 NEURO-ONC/ MRB: Clinically with improved cognition. Recommend stopping ritalin in light of no known benefit and worsening tremor. Reestablish care with Dr. Agudelo. Imaging with no overt evidence of cancer recurrence, the punctate area of contrast enhancement nearly resolved; continue imaging surveillance.   -1/17/2023 NEURO-ONC/ MRB: Stable neurological deficits; recommending neuropsych evaluation to objectively assess cognition and capacity. Imaging with no overt evidence of cancer recurrence.  -4/18/2023 NEURO-ONC/ MRB: Decrease in Hb. Completed neuro-psych evaluation; diagnosed as having dementia and is not capable of independent decision-making. Imaging with no new concerns.   -8/22/2023 NEURO-ONC/ MRB: No new neurological concerns. Imaging with changes of unclear significance; repeat in 1 month.   -9/26/2023 NEURO-ONC/ MRB: No new neurological concerns. Imaging with evidence of cancer recurrence. Referral to radiation oncology for a discussion about repeat radiation. Remaining focused on maintaining a good quality of life.    -10/2023 RADS: 35cGy in 10 fractions.  -10/2023 CHEMO: Started bevacizumab (polina).  -10/2023 MR brain imaging with largely stable findings.   -12/19/2023 NEURO-ONC/ MRB: Very poor functional status. Imaging with no new concerns. Decreasing Vimpat dosing to 50mg BID from 100mg BID. Recommendation for transitioning to hospice; referral placed.    -6/2024 MR brain imaging with increased patch peripheral enhancement of the left frontoparietal resection cavity.  -12/19/2023 NEURO-ONC/ MRB: Very poor functional status. Imaging with cancer progression. Petey are wanting to  "pursue radiotherapy and I offered re-initiation of polina to improve tolerance.       PHYSICAL EXAMINATION  /77 (BP Location: Left arm, Patient Position: Sitting, Cuff Size: Adult Regular)   Pulse 70   Temp 98.7  F (37.1  C) (Oral)   Resp 16   SpO2 96%   Wt Readings from Last 2 Encounters:   06/01/24 76.4 kg (168 lb 8 oz)   05/06/24 82.9 kg (182 lb 12.2 oz)      Ht Readings from Last 2 Encounters:   06/27/24 1.676 m (5' 6\")   10/18/23 1.702 m (5' 7\")     KPS: 40    Alert.   Limited verbal output, one word answers, significant delay in response.  Limited to no spontaneous movement of the right arm/ hand.   In a wheelchair.       MEDICAL RECORDS  Personally reviewed; Radiation oncology clinic note with Dr. Spann last week; Reviewed imaging.    LABS  Personally reviewed all available lab results; Vimpat level of 7.2 and procalcitonin of 0.1 in June.     IMAGING  Personally reviewed MR brain imaging from yesterday and compared to prior imaging.    Formal read; \"Marked progression of enhancement in the left parietal lobe with increased surrounding edema. Increased mass effect in the left cerebral hemisphere and increased cerebral blood volume in the enhancing component. Overall constellation of findings are suggestive of local tumor progression. Involvement of inferior left precentral and postcentral gyrus, supramarginal gyrus and angular gyrus, may cause conductive type aphasia.\"    Imaging was shown to and results were reviewed with Olga and Fahad.       IMPRESSION  On date of service, 55 minutes was spent in clinic and 12 minutes was spent preparing for the visit through extensive chart review and coordinating care for this high complexity visit. The following is in explanation for the recommendations used to define the plan.       Olga continues to demonstrate a poor physical and cognitive performance status despite rehab efforts. While Fahad voiced today that he has observed overall improvements, " her functional ability remains low. In addition, Olga's two most recent imaging has demonstrated marked radiographic evidence of cancer progression.     With regard to chemotherapy-based cancer-directed therapy, oral temozolomide monotherapy is not enough to stop this cancer progression. In the setting of no efficacy, Olga would only stand to inappropriately take on all the side effects of treatment with no benefit. Given the large degree of intracranial cancer burden, we discussed how a more powerful tool, being radiation, would be necessary at this juncture. Olga and Fahad met with Dr. Spann and it was Fahad's understanding that repeat radiotherapy was an option and the recommended next step in therapy. I communicated this with Dr. Spann. In the setting of a repeat course of radiotherapy, I offered re-initiation of bevacizumab (polina) to improve tolerance.    If radiation was not to be utilized, there would be no role for polina as Fahad has observed that Olga is seeing clinical improvement despite cancer progression seen on imaging. As a result, the radiographic evidence of mass effect and edema are not translating to a decline in her functional status and thus, the role of polina, being to reduce symptom burden caused by mass effect and edema, would have no place in her treatment plan.     Based on Olga's examination again today in clinic and the global cerebral atrophy seen on imaging, it is clear that Olga has dementia. Consequently, her medical decision maker is her , Fahad. As I confirmed today, Fahad remains acutely aware that Olga has a type of brain cancer for which there is currently no cure. Therefore, I again stressed the need for maintaining a good quality of life. I voiced that I had significant concerns surrounding her tolerance for any additional cancer-directed treatment and how the known and anticipated associated fatigue, intracranial swelling, etc would further  diminish her ability to interact with others, reverse all the functional improvement she has seen, and greatly reduce her quality of life. Fahad is wanting to proceed with treatment.     PROBLEM LIST  Glioblastoma  Aphasia  Apraxia  Lacks capacity, dementia  Gait instability  Fatigue    PLAN  -CANCER DIRECTED THERAPY-  -As above; Return referral to radiation oncology.      -STEROIDS-  -Off dexamethasone.    -SEIZURE MANAGEMENT-  -Given history of seizures, antiepileptics are indicated.    Continue Keppra and Zonisamide.   Did not tolerate Vimpat.     -DVT-  -On Xarelto.   -Requires lifelong anticoagulation given cancer diagnosis.   -IVC filter in place.     Return to clinic pending start of radiotherapy.     Stephanie Baker MD  Neuro-oncology      Again, thank you for allowing me to participate in the care of your patient.        Sincerely,        Stephanie Baker MD

## 2024-08-27 ENCOUNTER — VIRTUAL VISIT (OUTPATIENT)
Dept: RADIATION ONCOLOGY | Facility: CLINIC | Age: 79
End: 2024-08-27
Attending: STUDENT IN AN ORGANIZED HEALTH CARE EDUCATION/TRAINING PROGRAM
Payer: MEDICARE

## 2024-08-27 ENCOUNTER — DOCUMENTATION ONLY (OUTPATIENT)
Dept: RADIATION ONCOLOGY | Facility: CLINIC | Age: 79
End: 2024-08-27

## 2024-08-27 DIAGNOSIS — C71.9 GLIOBLASTOMA (H): Primary | ICD-10-CM

## 2024-08-27 DIAGNOSIS — C71.9 GBM (GLIOBLASTOMA MULTIFORME) (H): Primary | ICD-10-CM

## 2024-08-27 NOTE — PROGRESS NOTES
Radiation Oncology Chart Note     I discussed with Fahad with Olga that I reviewed her most recent MRI with her prior RT plans overlayed and currently there is not a safe target for additional radiotherapy. Fahad would like me to ask Dr. Baker if additional systemic therapy is possible and follow up with him.       Betsy Spann MD, PhD     Department of Radiation Oncology  [facility]

## 2024-08-27 NOTE — PROGRESS NOTES
Radiation Oncology Chart Note     I confirmed with patient's partner there are no systemic therapy options after confirming with neuro-oncology. Because the area of contrast enhancement is within the prior RT fields, it would be reasonable to reimage in 6 weeks to get more information if imaging represents progression vs treatment effect. My team will reach out to schedule a follow up with me.     Betsy Spann MD, PhD     Department of Radiation Oncology  [facility]

## 2024-08-27 NOTE — LETTER
8/27/2024      Olga Bailey  [place] [address]  [facility] [address]      Dear Colleague,    Thank you for referring your patient, Olga Bailey, to the [facility] RADIATION ONCOLOGY. Please see a copy of my visit note below.    Radiation Oncology Chart Note     I discussed with Fahad with Olga that I reviewed her most recent MRI with her prior RT plans overlayed and currently there is not a safe target for additional radiotherapy. Fahad would like me to ask Dr. Baker if additional systemic therapy is possible and follow up with him.       Betsy Spann MD, PhD     Department of Radiation Oncology  [facility]       Again, thank you for allowing me to participate in the care of your patient.        Sincerely,        Betsy Spann MD PhD

## 2024-09-18 NOTE — PROGRESS NOTES
Sep 19, 2024  Olga Bailey  [phone] (home)   : 1945  MRN: [id number]  Neuro-Oncologist: Stephanie Baker MD  Neurosurgeon: Dr. Cummings  Attending Radiation Oncologist: Betsy Spann MD PhD    RADIATION ONCOLOGY FOLLOW UP    History of Present Illness:  Ms. 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 She was treated again with radiotherapy to a total dose of 3500 cGy in 10 fx completed 10/25/23. Additionally, she received adjuvant bevacizumab during radiation in 10/2023 that was discontinued in 2023 due to lack of clinical improvement.The patient was last seen 24. She is now about 10 months post her second RT course. At her most recent appointment, her MRI showed evidence of pseudo progression vs recurrence, very likely recurrence. Unfortunately, the area of concern is within both prior treatment fields and there is no safe target for radiation. Dr. Baker also notes their is no option for systemic chemotherapy at this time.       Interval History:   MR brain 2024  MPRESSION:  Increased extensive heterogeneous enhancement with increase relative  cerebral blood volume causing 10 mm left-to-right midline shift,  favored to represent progressive glioblastoma.    Today, her  specifically reports the following symptoms:    Brain ROS:  Headaches- none  Seizures- none   Nausea/vomiting-  none  Changes in cognition/behavior- decreased ability to speak, walk, move  Vision/hearing changes- abnormal pupil dilation  Other focal neuro deficits- n/a    Review of Systems:  Denies additional symptoms related to current diagnosis.      Current systemic therapy: none  Anti-epileptic: keppra, zonisamide       Current Outpatient Medications:     amLODIPine (NORVASC) 5 MG tablet, Take 1 tablet (5 mg) by mouth daily, Disp:  , Rfl:     FLUoxetine (PROZAC)  20 MG capsule, Take 20 mg by mouth daily, Disp: , Rfl:     guaiFENesin (MUCINEX) 600 MG 12 hr tablet, Take 600 mg by mouth 2 times daily, Disp: , Rfl:     ipratropium - albuterol 0.5 mg/2.5 mg/3 mL (DUONEB) 0.5-2.5 (3) MG/3ML neb solution, Take 1 vial (3 mLs) by nebulization every 6 hours, Disp: 90 mL, Rfl: 6    levETIRAcetam (KEPPRA) 750 MG tablet, Take 750 mg by mouth 2 times daily, Disp: , Rfl:     multivitamin, therapeutic (THERA-VIT) TABS tablet, Take 1 tablet by mouth daily, Disp: , Rfl:     pantoprazole (PROTONIX) 40 MG EC tablet, Take 1 tablet (40 mg) by mouth 2 times daily (before meals), Disp: 30 tablet, Rfl: 0    polyethylene glycol (MIRALAX) 17 GM/Dose powder, Take 17 g by mouth 2 times daily as needed for constipation, Disp: 510 g, Rfl: 0    rivaroxaban ANTICOAGULANT (XARELTO ANTICOAGULANT) 20 MG TABS tablet, Take 1 tablet (20 mg) by mouth daily (with dinner), Disp: 30 tablet, Rfl: 3    zonisamide (ZONEGRAN) 25 MG capsule, Take 25 mg by mouth daily. Takes 25 mg in am and 50 mg at night (see other medication), Disp: , Rfl:     zonisamide (ZONEGRAN) 50 MG capsule, Take 50 mg by mouth 2 times daily, Disp: , Rfl:     acetaminophen (TYLENOL) 500 MG tablet, Take 500-1,000 mg by mouth every 4 hours as needed for mild pain (Patient not taking: Reported on 9/19/2024), Disp: , Rfl:     ipratropium - albuterol 0.5 mg/2.5 mg/3 mL (DUONEB) 0.5-2.5 (3) MG/3ML neb solution, Take 1 vial (3 mLs) by nebulization every 6 hours as needed for shortness of breath, wheezing or cough, Disp: 90 mL, Rfl: 6    loperamide (IMODIUM) 2 MG capsule, Take 4 mg by mouth as needed for diarrhea Then 1 capsule after consecutive stools., Disp: , Rfl:     LORazepam (ATIVAN) 0.5 MG tablet, Take 0.5 mg by mouth 2 times daily as needed for seizures More than 10 minutes between each doses, Disp: , Rfl:     ondansetron (ZOFRAN) 4 MG tablet, Take 1 tablet (4 mg) by mouth every 8 hours as needed for nausea, Disp: 30 tablet, Rfl: 3     pramox-pe-glycerin-petrolatum (PREPARATION H) 1-0.25-14.4-15 % CREA cream, Place rectally 3 times daily as needed for hemorrhoids, Disp: 51 g, Rfl: 0    senna-docusate (SENOKOT-S/PERICOLACE) 8.6-50 MG tablet, Take 1 tablet by mouth daily as needed for constipation, Disp: , Rfl:     sodium chloride (OCEAN) 0.65 % nasal spray, Spray 1 spray into both nostrils every hour as needed for congestion, Disp: 104 mL, Rfl: 0       Allergies   Allergen Reactions    Pilocarpine Headache and Nausea and Vomiting    Chlorhexidine Itching and Rash    Aripiprazole Headache and Other (See Comments)     Insomnia, nightmares    Bacitracin Rash     Bactroban is effective; No difficulties    Erythromycin      intolerant.    Meperidine Hcl      intolerant only   Demerol    Chlorhexidine Gluconate Rash       Physical Exam:      ECO  KPS: 50    Vital Signs: Pulse 82   Resp 16   SpO2 96%   Physical Exam:   GENERAL: Sitting comfortably in chair and no distress  HEAD: Normocephalic, atraumatic  EYES: Pupil dilation not symmetric.  No discharge or erythema, or obvious scleral/conjunctival abnormalities.  NOSE: No discharge, normal appearance   RESP: No audible wheeze, cough, or visible cyanosis.  No visible retractions or increased work of breathing.    SKIN: Visible skin clear. No significant rash, abnormal pigmentation or lesions.  NEURO: Does not respond to questions. Minimal blinking. Does not follow commands.   EXTREMITIES: No obvious edema      Last CBC with Diff  WBC   Date Value Ref Range Status   2021 3.8 (L) 4.0 - 11.0 10e9/L Final     WBC Count   Date Value Ref Range Status   2024 3.1 (L) 4.0 - 11.0 10e3/uL Final     RBC Count   Date Value Ref Range Status   2024 3.86 3.80 - 5.20 10e6/uL Final   2021 3.52 (L) 3.8 - 5.2 10e12/L Final     Hemoglobin   Date Value Ref Range Status   2024 10.1 (L) 11.7 - 15.7 g/dL Final   2021 10.7 (L) 11.7 - 15.7 g/dL Final     Hematocrit   Date Value Ref Range  Status   06/01/2024 33.3 (L) 35.0 - 47.0 % Final   07/09/2021 33.9 (L) 35.0 - 47.0 % Final     MCV   Date Value Ref Range Status   06/01/2024 86 78 - 100 fL Final   07/09/2021 96 78 - 100 fl Final     MCH   Date Value Ref Range Status   06/01/2024 26.2 (L) 26.5 - 33.0 pg Final   07/09/2021 30.4 26.5 - 33.0 pg Final     MCHC   Date Value Ref Range Status   06/01/2024 30.3 (L) 31.5 - 36.5 g/dL Final   07/09/2021 31.6 31.5 - 36.5 g/dL Final     RDW   Date Value Ref Range Status   06/01/2024 15.1 (H) 10.0 - 15.0 % Final   07/09/2021 14.7 10.0 - 15.0 % Final     Platelet Count   Date Value Ref Range Status   06/01/2024 255 150 - 450 10e3/uL Final   07/09/2021 200 150 - 450 10e9/L Final     Diff Method   Date Value Ref Range Status   07/09/2021 Automated Method  Final     % Neutrophils   Date Value Ref Range Status   06/01/2024 90 % Final   07/09/2021 60.9 % Final     % Lymphocytes   Date Value Ref Range Status   06/01/2024 7 % Final   07/09/2021 9.7 % Final     % Monocytes   Date Value Ref Range Status   06/01/2024 3 % Final   07/09/2021 15.7 % Final     % Eosinophils   Date Value Ref Range Status   06/01/2024 0 % Final   07/09/2021 12.9 % Final     % Basophils   Date Value Ref Range Status   06/01/2024 0 % Final   07/09/2021 0.3 % Final     Absolute Neutrophil   Date Value Ref Range Status   07/09/2021 2.3 1.6 - 8.3 10e9/L Final     Absolute Neutrophils   Date Value Ref Range Status   02/03/2022 2.6 1.6 - 8.3 10e3/uL Final     Absolute Lymphocytes   Date Value Ref Range Status   02/03/2022 0.2 (L) 0.8 - 5.3 10e3/uL Final   07/09/2021 0.4 (L) 0.8 - 5.3 10e9/L Final     Absolute Monocytes   Date Value Ref Range Status   02/03/2022 0.5 0.0 - 1.3 10e3/uL Final   07/09/2021 0.6 0.0 - 1.3 10e9/L Final        Last Comprehensive Metabolic Panel:  Sodium   Date Value Ref Range Status   06/01/2024 141 135 - 145 mmol/L Final     Comment:     Reference intervals for this test were updated on 09/26/2023 to more accurately reflect  our healthy population. There may be differences in the flagging of prior results with similar values performed with this method. Interpretation of those prior results can be made in the context of the updated reference intervals.    07/09/2021 139 133 - 144 mmol/L Final     Potassium   Date Value Ref Range Status   06/01/2024 4.4 3.4 - 5.3 mmol/L Final   01/13/2023 3.9 3.4 - 5.3 mmol/L Final   07/09/2021 3.7 3.4 - 5.3 mmol/L Final     Chloride   Date Value Ref Range Status   06/01/2024 102 98 - 107 mmol/L Final   01/13/2023 106 94 - 109 mmol/L Final   07/09/2021 109 94 - 109 mmol/L Final     Carbon Dioxide   Date Value Ref Range Status   07/09/2021 25 20 - 32 mmol/L Final     Carbon Dioxide (CO2)   Date Value Ref Range Status   06/01/2024 23 22 - 29 mmol/L Final   01/13/2023 28 20 - 32 mmol/L Final     Anion Gap   Date Value Ref Range Status   06/01/2024 16 (H) 7 - 15 mmol/L Final   01/13/2023 6 3 - 14 mmol/L Final   07/09/2021 5 3 - 14 mmol/L Final     Glucose   Date Value Ref Range Status   06/01/2024 134 (H) 70 - 99 mg/dL Final   01/13/2023 93 70 - 99 mg/dL Final   07/09/2021 92 70 - 99 mg/dL Final     GLUCOSE BY METER POCT   Date Value Ref Range Status   05/08/2024 92 70 - 99 mg/dL Final     Urea Nitrogen   Date Value Ref Range Status   06/01/2024 18.6 8.0 - 23.0 mg/dL Final   01/13/2023 16 7 - 30 mg/dL Final   07/09/2021 10 7 - 30 mg/dL Final     Creatinine   Date Value Ref Range Status   06/01/2024 0.75 0.51 - 0.95 mg/dL Final   07/09/2021 0.55 0.52 - 1.04 mg/dL Final     GFR Estimate   Date Value Ref Range Status   06/01/2024 81 >60 mL/min/1.73m2 Final   07/09/2021 >90 >60 mL/min/[1.73_m2] Final     Comment:     Non  GFR Calc  Starting 12/18/2018, serum creatinine based estimated GFR (eGFR) will be   calculated using the Chronic Kidney Disease Epidemiology Collaboration   (CKD-EPI) equation.       Calcium   Date Value Ref Range Status   06/01/2024 9.7 8.8 - 10.2 mg/dL Final   07/09/2021 9.1  8.5 - 10.1 mg/dL Final        Imaging and Diagnostic Studies:   See HPI above    Assessment/Plan: Ms. 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 She was treated again with radiotherapy to a total dose of 3500 cGy in 10 fx completed 10/25/23. Additionally, she received adjuvant bevacizumab during radiation in 10/2023 that was discontinued in 11/2023 due to lack of clinical improvement.The patient was last seen 8/08/24. She is now about 10 months post her second RT course. At her most recent appointment, her MRI showed evidence of pseudo progression vs recurrence, very likely recurrence. Unfortunately, the area of concern is within both prior treatment fields and there is no safe target for radiation. Dr. Baker also notes their is no option for systemic chemotherapy at this time.     Imaging today and Olga's clinical status support disease progression.     1) Ms. Bailey's disease recurrence is within both prior treatment fields and further radiation to this area of disease would confer a very high risk of complication, outweighing any benefit.  2) We will hold on prophylactic steroid use since Olga currently seems comfortable to avoid unnecessary side effects of steroids. This can be revisited should she develop signs or symptoms of cerebral edema.   3) We will see Olga by video visit without imaging in 4 weeks to check in on her symptoms.     Allie Kuhn, MS4  Radiation Oncology  Department of Radiation Oncology  [facility]  Phone: [phone]    A medical student participated in the care of this patient and in the preparation of this note. I have verified and edited this note. I personally performed key elements of the physical exam and medical decision making for this patient.  I agree with the assessment and plan of care as  documented in the note above.      The overall time I spent on direct patient care including self review of records, labs, and radiographic studies, as well as, direct face-to-face interaction with the patient and coordination of care with other providers was 15 minutes on the day of the encounter.    Betsy Spann MD, PhD     Department of Radiation Oncology  [facility]

## 2024-09-19 ENCOUNTER — HOSPITAL ENCOUNTER (OUTPATIENT)
Dept: MRI IMAGING | Facility: CLINIC | Age: 79
Discharge: HOME | End: 2024-09-19
Attending: STUDENT IN AN ORGANIZED HEALTH CARE EDUCATION/TRAINING PROGRAM | Admitting: STUDENT IN AN ORGANIZED HEALTH CARE EDUCATION/TRAINING PROGRAM
Payer: MEDICARE

## 2024-09-19 ENCOUNTER — OFFICE VISIT (OUTPATIENT)
Dept: RADIATION ONCOLOGY | Facility: CLINIC | Age: 79
End: 2024-09-19
Attending: STUDENT IN AN ORGANIZED HEALTH CARE EDUCATION/TRAINING PROGRAM
Payer: MEDICARE

## 2024-09-19 DIAGNOSIS — C71.9 GLIOBLASTOMA (H): Primary | ICD-10-CM

## 2024-09-19 DIAGNOSIS — C71.9 GLIOBLASTOMA (H): ICD-10-CM

## 2024-09-19 PROCEDURE — 999N000248 HC STATISTIC IV INSERT WITH US BY RN

## 2024-09-19 PROCEDURE — 70553 MRI BRAIN STEM W/O & W/DYE: CPT | Mod: 26 | Performed by: RADIOLOGY

## 2024-09-19 PROCEDURE — G0463 HOSPITAL OUTPT CLINIC VISIT: HCPCS | Performed by: STUDENT IN AN ORGANIZED HEALTH CARE EDUCATION/TRAINING PROGRAM

## 2024-09-19 PROCEDURE — 255N000002 HC RX 255 OP 636: Performed by: STUDENT IN AN ORGANIZED HEALTH CARE EDUCATION/TRAINING PROGRAM

## 2024-09-19 PROCEDURE — 99212 OFFICE O/P EST SF 10 MIN: CPT | Performed by: STUDENT IN AN ORGANIZED HEALTH CARE EDUCATION/TRAINING PROGRAM

## 2024-09-19 PROCEDURE — A9585 GADOBUTROL INJECTION: HCPCS | Performed by: STUDENT IN AN ORGANIZED HEALTH CARE EDUCATION/TRAINING PROGRAM

## 2024-09-19 PROCEDURE — 70553 MRI BRAIN STEM W/O & W/DYE: CPT

## 2024-09-19 RX ORDER — GADOBUTROL 604.72 MG/ML
7.5 INJECTION INTRAVENOUS ONCE
Status: COMPLETED | OUTPATIENT
Start: 2024-09-19 | End: 2024-09-19

## 2024-09-19 RX ORDER — ZONISAMIDE 25 MG/1
25 CAPSULE ORAL DAILY
COMMUNITY
Start: 2024-09-09

## 2024-09-19 RX ADMIN — GADOBUTROL 7.5 ML: 604.72 INJECTION INTRAVENOUS at 11:40

## 2024-09-19 ASSESSMENT — PAIN SCALES - GENERAL: PAINLEVEL: NO PAIN (0)

## 2024-09-19 NOTE — LETTER
2024      Olga Bailey  [place] [address] [facility] [address]      Dear Colleague,    Thank you for referring your patient, Olga Bailey, to the [facility] RADIATION ONCOLOGY. Please see a copy of my visit note below.    Sep 19, 2024  Olga Bailey  [phone] (home)   : 1945  MRN: [id number]  Neuro-Oncologist: Stephanie Baker MD  Neurosurgeon: Dr. Cummings  Attending Radiation Oncologist: Betsy Spann MD PhD    RADIATION ONCOLOGY FOLLOW UP    History of Present Illness:  Ms. 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 She was treated again with radiotherapy to a total dose of 3500 cGy in 10 fx completed 10/25/23. Additionally, she received adjuvant bevacizumab during radiation in 10/2023 that was discontinued in 2023 due to lack of clinical improvement.The patient was last seen 24. She is now about 10 months post her second RT course. At her most recent appointment, her MRI showed evidence of pseudo progression vs recurrence, very likely recurrence. Unfortunately, the area of concern is within both prior treatment fields and there is no safe target for radiation. Dr. Baker also notes their is no option for systemic chemotherapy at this time.       Interval History:   MR brain 2024  MPRESSION:  Increased extensive heterogeneous enhancement with increase relative  cerebral blood volume causing 10 mm left-to-right midline shift,  favored to represent progressive glioblastoma.    Today, her  specifically reports the following symptoms:    Brain ROS:  Headaches- none  Seizures- none   Nausea/vomiting-  none  Changes in cognition/behavior- decreased ability to speak, walk, move  Vision/hearing changes- abnormal pupil dilation  Other focal neuro deficits- n/a    Review of Systems:  Denies  additional symptoms related to current diagnosis.      Current systemic therapy: none  Anti-epileptic: keppra, zonisamide       Current Outpatient Medications:      amLODIPine (NORVASC) 5 MG tablet, Take 1 tablet (5 mg) by mouth daily, Disp:  , Rfl:      FLUoxetine (PROZAC) 20 MG capsule, Take 20 mg by mouth daily, Disp: , Rfl:      guaiFENesin (MUCINEX) 600 MG 12 hr tablet, Take 600 mg by mouth 2 times daily, Disp: , Rfl:      ipratropium - albuterol 0.5 mg/2.5 mg/3 mL (DUONEB) 0.5-2.5 (3) MG/3ML neb solution, Take 1 vial (3 mLs) by nebulization every 6 hours, Disp: 90 mL, Rfl: 6     levETIRAcetam (KEPPRA) 750 MG tablet, Take 750 mg by mouth 2 times daily, Disp: , Rfl:      multivitamin, therapeutic (THERA-VIT) TABS tablet, Take 1 tablet by mouth daily, Disp: , Rfl:      pantoprazole (PROTONIX) 40 MG EC tablet, Take 1 tablet (40 mg) by mouth 2 times daily (before meals), Disp: 30 tablet, Rfl: 0     polyethylene glycol (MIRALAX) 17 GM/Dose powder, Take 17 g by mouth 2 times daily as needed for constipation, Disp: 510 g, Rfl: 0     rivaroxaban ANTICOAGULANT (XARELTO ANTICOAGULANT) 20 MG TABS tablet, Take 1 tablet (20 mg) by mouth daily (with dinner), Disp: 30 tablet, Rfl: 3     zonisamide (ZONEGRAN) 25 MG capsule, Take 25 mg by mouth daily. Takes 25 mg in am and 50 mg at night (see other medication), Disp: , Rfl:      zonisamide (ZONEGRAN) 50 MG capsule, Take 50 mg by mouth 2 times daily, Disp: , Rfl:      acetaminophen (TYLENOL) 500 MG tablet, Take 500-1,000 mg by mouth every 4 hours as needed for mild pain (Patient not taking: Reported on 9/19/2024), Disp: , Rfl:      ipratropium - albuterol 0.5 mg/2.5 mg/3 mL (DUONEB) 0.5-2.5 (3) MG/3ML neb solution, Take 1 vial (3 mLs) by nebulization every 6 hours as needed for shortness of breath, wheezing or cough, Disp: 90 mL, Rfl: 6     loperamide (IMODIUM) 2 MG capsule, Take 4 mg by mouth as needed for diarrhea Then 1 capsule after consecutive stools., Disp: , Rfl:       LORazepam (ATIVAN) 0.5 MG tablet, Take 0.5 mg by mouth 2 times daily as needed for seizures More than 10 minutes between each doses, Disp: , Rfl:      ondansetron (ZOFRAN) 4 MG tablet, Take 1 tablet (4 mg) by mouth every 8 hours as needed for nausea, Disp: 30 tablet, Rfl: 3     pramox-pe-glycerin-petrolatum (PREPARATION H) 1-0.25-14.4-15 % CREA cream, Place rectally 3 times daily as needed for hemorrhoids, Disp: 51 g, Rfl: 0     senna-docusate (SENOKOT-S/PERICOLACE) 8.6-50 MG tablet, Take 1 tablet by mouth daily as needed for constipation, Disp: , Rfl:      sodium chloride (OCEAN) 0.65 % nasal spray, Spray 1 spray into both nostrils every hour as needed for congestion, Disp: 104 mL, Rfl: 0       Allergies   Allergen Reactions     Pilocarpine Headache and Nausea and Vomiting     Chlorhexidine Itching and Rash     Aripiprazole Headache and Other (See Comments)     Insomnia, nightmares     Bacitracin Rash     Bactroban is effective; No difficulties     Erythromycin      intolerant.     Meperidine Hcl      intolerant only   Demerol     Chlorhexidine Gluconate Rash       Physical Exam:      ECO  KPS: 50    Vital Signs: Pulse 82   Resp 16   SpO2 96%   Physical Exam:   GENERAL: Sitting comfortably in chair and no distress  HEAD: Normocephalic, atraumatic  EYES: Pupil dilation not symmetric.  No discharge or erythema, or obvious scleral/conjunctival abnormalities.  NOSE: No discharge, normal appearance   RESP: No audible wheeze, cough, or visible cyanosis.  No visible retractions or increased work of breathing.    SKIN: Visible skin clear. No significant rash, abnormal pigmentation or lesions.  NEURO: Does not respond to questions. Minimal blinking. Does not follow commands.   EXTREMITIES: No obvious edema      Last CBC with Diff  WBC   Date Value Ref Range Status   2021 3.8 (L) 4.0 - 11.0 10e9/L Final     WBC Count   Date Value Ref Range Status   2024 3.1 (L) 4.0 - 11.0 10e3/uL Final     RBC Count   Date  Value Ref Range Status   06/01/2024 3.86 3.80 - 5.20 10e6/uL Final   07/09/2021 3.52 (L) 3.8 - 5.2 10e12/L Final     Hemoglobin   Date Value Ref Range Status   06/01/2024 10.1 (L) 11.7 - 15.7 g/dL Final   07/09/2021 10.7 (L) 11.7 - 15.7 g/dL Final     Hematocrit   Date Value Ref Range Status   06/01/2024 33.3 (L) 35.0 - 47.0 % Final   07/09/2021 33.9 (L) 35.0 - 47.0 % Final     MCV   Date Value Ref Range Status   06/01/2024 86 78 - 100 fL Final   07/09/2021 96 78 - 100 fl Final     MCH   Date Value Ref Range Status   06/01/2024 26.2 (L) 26.5 - 33.0 pg Final   07/09/2021 30.4 26.5 - 33.0 pg Final     MCHC   Date Value Ref Range Status   06/01/2024 30.3 (L) 31.5 - 36.5 g/dL Final   07/09/2021 31.6 31.5 - 36.5 g/dL Final     RDW   Date Value Ref Range Status   06/01/2024 15.1 (H) 10.0 - 15.0 % Final   07/09/2021 14.7 10.0 - 15.0 % Final     Platelet Count   Date Value Ref Range Status   06/01/2024 255 150 - 450 10e3/uL Final   07/09/2021 200 150 - 450 10e9/L Final     Diff Method   Date Value Ref Range Status   07/09/2021 Automated Method  Final     % Neutrophils   Date Value Ref Range Status   06/01/2024 90 % Final   07/09/2021 60.9 % Final     % Lymphocytes   Date Value Ref Range Status   06/01/2024 7 % Final   07/09/2021 9.7 % Final     % Monocytes   Date Value Ref Range Status   06/01/2024 3 % Final   07/09/2021 15.7 % Final     % Eosinophils   Date Value Ref Range Status   06/01/2024 0 % Final   07/09/2021 12.9 % Final     % Basophils   Date Value Ref Range Status   06/01/2024 0 % Final   07/09/2021 0.3 % Final     Absolute Neutrophil   Date Value Ref Range Status   07/09/2021 2.3 1.6 - 8.3 10e9/L Final     Absolute Neutrophils   Date Value Ref Range Status   02/03/2022 2.6 1.6 - 8.3 10e3/uL Final     Absolute Lymphocytes   Date Value Ref Range Status   02/03/2022 0.2 (L) 0.8 - 5.3 10e3/uL Final   07/09/2021 0.4 (L) 0.8 - 5.3 10e9/L Final     Absolute Monocytes   Date Value Ref Range Status   02/03/2022 0.5 0.0 -  1.3 10e3/uL Final   07/09/2021 0.6 0.0 - 1.3 10e9/L Final        Last Comprehensive Metabolic Panel:  Sodium   Date Value Ref Range Status   06/01/2024 141 135 - 145 mmol/L Final     Comment:     Reference intervals for this test were updated on 09/26/2023 to more accurately reflect our healthy population. There may be differences in the flagging of prior results with similar values performed with this method. Interpretation of those prior results can be made in the context of the updated reference intervals.    07/09/2021 139 133 - 144 mmol/L Final     Potassium   Date Value Ref Range Status   06/01/2024 4.4 3.4 - 5.3 mmol/L Final   01/13/2023 3.9 3.4 - 5.3 mmol/L Final   07/09/2021 3.7 3.4 - 5.3 mmol/L Final     Chloride   Date Value Ref Range Status   06/01/2024 102 98 - 107 mmol/L Final   01/13/2023 106 94 - 109 mmol/L Final   07/09/2021 109 94 - 109 mmol/L Final     Carbon Dioxide   Date Value Ref Range Status   07/09/2021 25 20 - 32 mmol/L Final     Carbon Dioxide (CO2)   Date Value Ref Range Status   06/01/2024 23 22 - 29 mmol/L Final   01/13/2023 28 20 - 32 mmol/L Final     Anion Gap   Date Value Ref Range Status   06/01/2024 16 (H) 7 - 15 mmol/L Final   01/13/2023 6 3 - 14 mmol/L Final   07/09/2021 5 3 - 14 mmol/L Final     Glucose   Date Value Ref Range Status   06/01/2024 134 (H) 70 - 99 mg/dL Final   01/13/2023 93 70 - 99 mg/dL Final   07/09/2021 92 70 - 99 mg/dL Final     GLUCOSE BY METER POCT   Date Value Ref Range Status   05/08/2024 92 70 - 99 mg/dL Final     Urea Nitrogen   Date Value Ref Range Status   06/01/2024 18.6 8.0 - 23.0 mg/dL Final   01/13/2023 16 7 - 30 mg/dL Final   07/09/2021 10 7 - 30 mg/dL Final     Creatinine   Date Value Ref Range Status   06/01/2024 0.75 0.51 - 0.95 mg/dL Final   07/09/2021 0.55 0.52 - 1.04 mg/dL Final     GFR Estimate   Date Value Ref Range Status   06/01/2024 81 >60 mL/min/1.73m2 Final   07/09/2021 >90 >60 mL/min/[1.73_m2] Final     Comment:     Non   American GFR Calc  Starting 12/18/2018, serum creatinine based estimated GFR (eGFR) will be   calculated using the Chronic Kidney Disease Epidemiology Collaboration   (CKD-EPI) equation.       Calcium   Date Value Ref Range Status   06/01/2024 9.7 8.8 - 10.2 mg/dL Final   07/09/2021 9.1 8.5 - 10.1 mg/dL Final        Imaging and Diagnostic Studies:   See HPI above    Assessment/Plan: Ms. 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 She was treated again with radiotherapy to a total dose of 3500 cGy in 10 fx completed 10/25/23. Additionally, she received adjuvant bevacizumab during radiation in 10/2023 that was discontinued in 11/2023 due to lack of clinical improvement.The patient was last seen 8/08/24. She is now about 10 months post her second RT course. At her most recent appointment, her MRI showed evidence of pseudo progression vs recurrence, very likely recurrence. Unfortunately, the area of concern is within both prior treatment fields and there is no safe target for radiation. Dr. Baker also notes their is no option for systemic chemotherapy at this time.     Imaging today and Olga's clinical status support disease progression.     1) Ms. Bailey's disease recurrence is within both prior treatment fields and further radiation to this area of disease would confer a very high risk of complication, outweighing any benefit.  2) We will hold on prophylactic steroid use since Olga currently seems comfortable to avoid unnecessary side effects of steroids. This can be revisited should she develop signs or symptoms of cerebral edema.   3) We will see Olga by video visit without imaging in 4 weeks to check in on her symptoms.     Allie Kuhn, MS4  Radiation Oncology  Department of Radiation Oncology  [facility]  Phone: [phone]    A  "medical student participated in the care of this patient and in the preparation of this note. I have verified and edited this note. I personally performed key elements of the physical exam and medical decision making for this patient.  I agree with the assessment and plan of care as documented in the note above.      The overall time I spent on direct patient care including self review of records, labs, and radiographic studies, as well as, direct face-to-face interaction with the patient and coordination of care with other providers was 15 minutes on the day of the encounter.    Betsy Spann MD, PhD     Department of Radiation Oncology  [facility]           Oncology Rooming Note    September 19, 2024 1:58 PM   Olga Bailey is a 79 year old female who presents for:    Chief Complaint   Patient presents with     Oncology Clinic Visit     Return visit after Radiation Therapy     Initial Vitals: Pulse 82   Resp 16   SpO2 96%  Estimated body mass index is 27.2 kg/m  as calculated from the following:    Height as of 6/27/24: 1.676 m (5' 6\").    Weight as of 6/1/24: 76.4 kg (168 lb 8 oz). There is no height or weight on file to calculate BSA.  No Pain (0) Comment: Data Unavailable   No LMP recorded. Patient has had a hysterectomy.  Allergies reviewed: Yes  Medications reviewed: Yes    Medications: Medication refills not needed today.  Pharmacy name entered into [facility]: [place], [place] - [address] [place] [address]    Frailty Screening:   Is the patient here for a new oncology consult visit in cancer care? 2. No      Clinical concerns: Patient has expressive aphasia,  answered questions.  expressed frustration with the health care system that he isn't listened to. Left anasecoria that is transiate and then will go back to normal pupil diameter, noticed about 4 weeks ago. Partial complex seizure about 6 weeks ago, lasted 15-20 sec, R facial " sofía. Spouse denies weakness, pt Doing weight training with 5 lb weights, doing repetitions with elastic band, standing, walked 11 paces 10 days ago. Would like their PRN ativan changed from a pill to a nasal spray.  Dr Spann was notified.      Stephanie Morgan, RN                Again, thank you for allowing me to participate in the care of your patient.        Sincerely,        Betsy Spann MD PhD

## 2024-09-19 NOTE — PROGRESS NOTES
"Oncology Rooming Note    September 19, 2024 1:58 PM   Olga Bailey is a 79 year old female who presents for:    Chief Complaint   Patient presents with    Oncology Clinic Visit     Return visit after Radiation Therapy     Initial Vitals: Pulse 82   Resp 16   SpO2 96%  Estimated body mass index is 27.2 kg/m  as calculated from the following:    Height as of 6/27/24: 1.676 m (5' 6\").    Weight as of 6/1/24: 76.4 kg (168 lb 8 oz). There is no height or weight on file to calculate BSA.  No Pain (0) Comment: Data Unavailable   No LMP recorded. Patient has had a hysterectomy.  Allergies reviewed: Yes  Medications reviewed: Yes    Medications: Medication refills not needed today.  Pharmacy name entered into [facility]: [facility] - [place], [place] - [address] [place] [address]    Frailty Screening:   Is the patient here for a new oncology consult visit in cancer care? 2. No      Clinical concerns: Patient has expressive aphasia,  answered questions.  expressed frustration with the health care system that he isn't listened to. Left anasecoria that is transiate and then will go back to normal pupil diameter, noticed about 4 weeks ago. Partial complex seizure about 6 weeks ago, lasted 15-20 sec, R facial twiching. Spouse denies weakness, pt Doing weight training with 5 lb weights, doing repetitions with elastic band, standing, walked 11 paces 10 days ago. Would like their PRN ativan changed from a pill to a nasal spray.  Dr Spann was notified.      Stephanie Morgan RN              "

## 2024-09-20 VITALS — OXYGEN SATURATION: 96 % | HEART RATE: 82 BPM | RESPIRATION RATE: 16 BRPM

## 2024-09-26 DIAGNOSIS — C71.9 GBM (GLIOBLASTOMA MULTIFORME) (H): Primary | ICD-10-CM

## 2025-01-08 ENCOUNTER — LAB REQUISITION (OUTPATIENT)
Dept: LAB | Facility: CLINIC | Age: 80
End: 2025-01-08
Payer: MEDICARE

## 2025-01-08 DIAGNOSIS — D64.9 ANEMIA, UNSPECIFIED: ICD-10-CM

## 2025-02-15 NOTE — PROGRESS NOTES
[facility]    Medicine Progress Note - Hospitalist Service, GOLD TEAM 10    Date of Admission:  10/6/2023    Assessment & Plan   Olga Bailey is a 78 year old female admitted on 10/6/2023. She has history of L frontoparietal glioblastoma s/p resection, chemotherapy and radiation with remission in 2021, recently found to have recurrence in September 2023, with baseline cognitive and functional impairment, h/o seizures, h/o DVT on xarelto, HTN, and depression who presented 10/6/23 with acute worsening of baseline aphasia as well as new facial twitching. Admitted to internal medicine for further work up and management. Neurology consulted and patient found to be having partial seizures for which lacosamide was added to keppra with improvement in seizure activity. Continued radiotherapy while on [facility] but has now transferred to [place] for concomitant bevacizumab. Tolerated bevacizumab well however developed worsening headaches and tiredness at reduced dose of steroids so increased back up to 4 mg BID per rad onc. Completed 10 days of radiation on 10/25/23. Has received ongoing bevacizumab per treatment cycle, last cycle finished on 11/15 and further treatment delayed until outpatient follow up with Dr. Baker neuro oncolgoist.  Medically ready to discharge. Ongoing dispo planning, current planning for LTACH.    Interval Changes:  - Ongoing work toward disposition, tolerated shower chair trial well yesterday per patient  - continue PT/OT/ST til discharge  - Discussing w/ Dr. Loomis if rec inpatient f/up MRI prior to discharge or if team would prefer imaging outpatient    Discharge planning  Medically stable for discharge pending safe discharge plan  - 11/13 - primary MD had long discussion with pts  and SW Ruth about discharge planning. Discussed that unfortunately TCU is not an option at this point and we need to move forward with LTC.   - 11/9 - discussed  Phone report was given to Char of PACU. at weekly SWAT meeting due to difficult discharge.  - Recommended looking into options for increasing care at assisted living vs LTC. This was discussed with pt, daughter and  by SW on 11/9.  - RNCC/SW team working on safe discharge plan, greatly appreciate assistance. Looking at community TCU options and LTC, has been refused by  TCU multiple times.  - PM&R consulted this admission to assist with evaluation for appropriate dispo and assess rehab potential, last evaluated on 11/13  [ ] Dispo: ensure PM&R f/up in place     Partial Seizure, controlled -no further seizure  Chronic aphasia   -Presented with acute (< 1 day) worsening of aphasia and new facial twitching with abnormal mouth movements concerning for partial seizure.  She underwent stroke eval in the ED which was reassuring against acute CVA  -Given recent recurrence of glioblastoma, there was concern for spread or advance of disease, though both CT and MRI were reassuring to stability of current malignancy from last imaging (9/2023).   -Neurology consulted and felt movements were consistent with partial seizure, lacosamide was added, though subsequent EEG showed some degree of persistent seizure activity, and further brief clinical seizures were observed.   -Neurology re-evaluated on 11/17 and 11/18 and EEG with no further epileptiform activity and seizures controlled with no further concern for break thru activity and adequate treatment levels of anti epileptics  Plan:  - Neurology consulted, signed off 11/18  - Continue levetiracetam 1250 mg BID   - Lacosamide 100 MG BID  - Steroid taper as below  - seizure precautions     H/o L frontoparietal glioblastoma s/p resection, chemotherapy and radiation (completed May 2021), with recurrence of malignancy 2023   Cognitive and functional impairment due to glioblastoma and chemoradiation   Brocas aphasia  Dementia  Seen by oncology on this visit, repeat imaging stable from September. Started on trial of  steroid, eval symptom improvement.   -Previous neuro psych testing (4/2023) - demonstrate dementia  -SLP evaluation - 11/17 - severe brocas aphasia  - Oncology consulted, appreciate input - signed off, s/p bevacizumab 10/18, 11/1, 11/15 - bevacizumab now on hold until outpatient follow up per discussion with Dr. Baker (outpatient oncologist) on 11/16  - Rad/Onc consulted, RT started on 10/12 for 10 fractions - finished on 10/25  - Dexamethasone 4mg IV BID  (10/9 - 10/25) - headache resolved and no new neuro symptoms. Start tapering as of 10/26 - 2 mg BID x 2 weeks (10/26-11/9), 2 mg daily x 2 weeks (11/10-11/23), then stop (taper ordered)  - TMP-SMX daily for PCP PPX (pt had hx of PJP)   - PT/OT consulted, appreciate input  - Discussing w/ outpatient Rad-onc regarding f/up and possible MRI f/up imaging prior to discharge  [ ] dispo: Likely LTC on discharge, significant discussion with primary MD, PMR and PT/OT, Case management on 11/13. [facility] unable to accept patient and recommendation per specialties for LTC placement on discharge     Likely aspiration pneumonitis: resolved  - brianna albuterol MDI q6H while awake   - DuoNeb QID PRN   - SLP consult completed, appreciate input. Soft diet level 6 with thin liquids.     Depression - Continue PTA Buspirone and Fluoxetine      H/o DVT - Continue PTA rivaroxaban      HTN - Continue PTA Amlodipine      Hemorrhoids   -topical preparation H  -monitor           Diet: Snacks/Supplements Adult: Other; Send Berry Magic Cup with dinner tray and allow pt/RN to order prn also (Also likes orange if in stock. NO van or ora); With Meals  Regular Diet Adult Thin Liquids (level 0)  Snacks/Supplements Adult: Other; please send Gelatein plus pineapple + whip topping @ 2:00 and cherry geltaien + whip topping @ 10:00; Between Meals    DVT Prophylaxis: DOAC  Martino Catheter: Not present  Lines: None     Cardiac Monitoring: None  Code Status: Full Code      Clinically Significant Risk Factors                   # Hypertension: Noted on problem list                   Disposition Plan     Expected Discharge Date: 11/23/2023    Discharge Delays: Placement - TCU  Destination: assisted living;nursing home  Discharge Comments: LTC - no seizures concerns per neuro. Still med ready          Olya Vences MD  Hospitalist Service, GOLD TEAM 10  [facility]  Securely message with [employer] (more info)  Text page via [employer] Paging/Directory   See signed in provider for up to date coverage information  ______________________________________________________________________    Interval History   No acute events overnight. Patient reporting doing well today. Patient reports working w/ therapy team today.  very attentive and caring towards patient, patient reporting being  for 50yrs.  reports trial of shower chair yesterday went well, no issues reported. Patient reports good appetite. No new pains. Ongoing discussions w/ CM/SW for complex dispo planning. Dr. Loomis's team yesterday reached out about follow up, they will work on arranging this, mentioned that would like MRI- will discuss if team would like this done inpatient prior to discharge.     Physical Exam   Vital Signs: Temp: 98.3  F (36.8  C) Temp src: Axillary BP: (!) 156/88 Pulse: 62   Resp: 16 SpO2: 96 % O2 Device: None (Room air)    Weight: 156 lbs 11.95 oz    General Appearance: No acute distress, lying in bed  Mouth: lip smacking noted  Respiratory: CTAB. No increased WOB. On RA  Cardiovascular: RRR. No murmur  GI: Soft. Non-tender. Non-distended.  : purewick draining yellow urine (did not assess)  Skin: No rash.  Neuro: right sided hemiparesis .  strength present in left hand and able to plantar and dorsiflex left foot. Sensation present to light touch on bilateral upper and lower distal extremities  Left hand tremor  Slowed responses to questions but answers appropriately    Medical Decision  Making       50 MINUTES SPENT BY ME on the date of service doing chart review, history, exam, documentation & further activities per the note.  MANAGEMENT DISCUSSED with the following over the past 24 hours: Makenna, RAMESH/CAMRON       Data         Imaging results reviewed over the past 24 hrs:   No results found for this or any previous visit (from the past 24 hour(s)).

## 2025-04-21 ENCOUNTER — TELEPHONE (OUTPATIENT)
Dept: ONCOLOGY | Facility: CLINIC | Age: 80
End: 2025-04-21
Payer: MEDICARE
